# Patient Record
Sex: MALE | Race: WHITE | NOT HISPANIC OR LATINO | Employment: OTHER | URBAN - METROPOLITAN AREA
[De-identification: names, ages, dates, MRNs, and addresses within clinical notes are randomized per-mention and may not be internally consistent; named-entity substitution may affect disease eponyms.]

---

## 2021-05-02 ENCOUNTER — OFFICE VISIT (OUTPATIENT)
Dept: URGENT CARE | Facility: CLINIC | Age: 76
End: 2021-05-02
Payer: COMMERCIAL

## 2021-05-02 VITALS
DIASTOLIC BLOOD PRESSURE: 60 MMHG | HEIGHT: 74 IN | TEMPERATURE: 95.9 F | HEART RATE: 53 BPM | SYSTOLIC BLOOD PRESSURE: 140 MMHG | BODY MASS INDEX: 30.85 KG/M2 | RESPIRATION RATE: 18 BRPM | WEIGHT: 240.4 LBS | OXYGEN SATURATION: 98 %

## 2021-05-02 DIAGNOSIS — L03.115 CELLULITIS OF RIGHT LOWER EXTREMITY: Primary | ICD-10-CM

## 2021-05-02 PROCEDURE — 99213 OFFICE O/P EST LOW 20 MIN: CPT | Performed by: PHYSICIAN ASSISTANT

## 2021-05-02 RX ORDER — HYDROCHLOROTHIAZIDE 25 MG/1
25 TABLET ORAL DAILY
COMMUNITY
End: 2022-05-05 | Stop reason: SDUPTHER

## 2021-05-02 RX ORDER — GLIMEPIRIDE 2 MG/1
2 TABLET ORAL
COMMUNITY
End: 2022-01-14 | Stop reason: SDUPTHER

## 2021-05-02 RX ORDER — CHLORAL HYDRATE 500 MG
4000 CAPSULE ORAL DAILY
COMMUNITY

## 2021-05-02 RX ORDER — ATORVASTATIN CALCIUM 40 MG/1
40 TABLET, FILM COATED ORAL DAILY
COMMUNITY
End: 2022-05-10 | Stop reason: SDUPTHER

## 2021-05-02 RX ORDER — AMLODIPINE BESYLATE 5 MG/1
5 TABLET ORAL DAILY
COMMUNITY
End: 2021-10-14 | Stop reason: SDUPTHER

## 2021-05-02 RX ORDER — LOSARTAN POTASSIUM 50 MG/1
100 TABLET ORAL DAILY
COMMUNITY
End: 2021-10-27 | Stop reason: SDUPTHER

## 2021-05-02 RX ORDER — ASPIRIN 81 MG/1
81 TABLET, CHEWABLE ORAL DAILY
COMMUNITY

## 2021-05-02 RX ORDER — CLOPIDOGREL BISULFATE 75 MG/1
75 TABLET ORAL DAILY
COMMUNITY

## 2021-05-02 RX ORDER — SULFAMETHOXAZOLE AND TRIMETHOPRIM 800; 160 MG/1; MG/1
1 TABLET ORAL EVERY 12 HOURS SCHEDULED
Qty: 14 TABLET | Refills: 0 | Status: SHIPPED | OUTPATIENT
Start: 2021-05-02 | End: 2021-05-09

## 2021-05-02 RX ORDER — METOPROLOL SUCCINATE 50 MG/1
50 TABLET, EXTENDED RELEASE ORAL DAILY
COMMUNITY
End: 2021-10-08 | Stop reason: SDUPTHER

## 2021-05-02 NOTE — PATIENT INSTRUCTIONS
Cellulitis of right lower extremity  Stop doxycycline for puncture wound  rx bactrim twice daily x 7 days sent via EMR  Instructed on daily dressing changes and cleansing daily  If no improvement in 3-5 days f/u with PCP or wound care due to diabetes history    Follow up with PCP in 3-5 days  Proceed to  ER if symptoms worsen  Cellulitis   WHAT YOU NEED TO KNOW:   Cellulitis is a skin infection caused by bacteria  Cellulitis may go away on its own or you may need treatment  Your healthcare provider may draw a [place] around the outside edges of your cellulitis  If your cellulitis spreads, your healthcare provider will see it outside of the [place]  DISCHARGE INSTRUCTIONS:   Call 911 if:   · You have sudden trouble breathing or chest pain  Return to the emergency department if:   · Your wound gets larger and more painful  · You feel a crackling under your skin when you touch it  · You have purple dots or bumps on your skin, or you see bleeding under your skin  · You have new swelling and pain in your legs  · The red, warm, swollen area gets larger  · You see red streaks coming from the infected area  Contact your healthcare provider if:   · You have a fever  · Your fever or pain does not go away or gets worse  · The area does not get smaller after 2 days of antibiotics  · Your skin is flaking or peeling off  · You have questions or concerns about your condition or care  Medicines:   · Antibiotics  help treat the bacterial infection  · NSAIDs , such as ibuprofen, help decrease swelling, pain, and fever  NSAIDs can cause stomach bleeding or kidney problems in certain people  If you take blood thinner medicine, always ask if NSAIDs are safe for you  Always read the medicine label and follow directions  Do not give these medicines to children under 10months of age without direction from your child's healthcare provider  · Acetaminophen  decreases pain and fever   It is available without a doctor's order  Ask how much to take and how often to take it  Follow directions  Read the labels of all other medicines you are using to see if they also contain acetaminophen, or ask your doctor or pharmacist  Acetaminophen can cause liver damage if not taken correctly  Do not use more than 4 grams (4,000 milligrams) total of acetaminophen in one day  · Take your medicine as directed  Contact your healthcare provider if you think your medicine is not helping or if you have side effects  Tell him or her if you are allergic to any medicine  Keep a list of the medicines, vitamins, and herbs you take  Include the amounts, and when and why you take them  Bring the list or the pill bottles to follow-up visits  Carry your medicine list with you in case of an emergency  Self-care:   · Elevate the area above the level of your heart  as often as you can  This will help decrease swelling and pain  Prop the area on pillows or blankets to keep it elevated comfortably  · Clean the area daily until the wound scabs over  Gently wash the area with soap and water  Pat dry  Use dressings as directed  · Place cool or warm, wet cloths on the area as directed  Use clean cloths and clean water  Leave it on the area until the cloth is room temperature  Pat the area dry with a clean, dry cloth  The cloths may help decrease pain  Prevent cellulitis:   · Do not scratch bug bites or areas of injury  You increase your risk for cellulitis by scratching these areas  · Do not share personal items, such as towels, clothing, and razors  · Clean exercise equipment  with germ-killing  before and after you use it  · Wash your hands often  Use soap and water  Wash your hands after you use the bathroom, change a child's diapers, or sneeze  Wash your hands before you prepare or eat food  Use lotion to prevent dry, cracked skin  · Wear pressure stockings as directed    You may be told to wear the stockings if you have peripheral edema  The stockings improve blood flow and decrease swelling  · Treat athlete's foot  This can help prevent the spread of a bacterial skin infection  Follow up with your healthcare provider within 3 days, or as directed: Your healthcare provider will check if your cellulitis is getting better  You may need different medicine  Write down your questions so you remember to ask them during your visits  © Copyright [address] Information is for End User's use only and may not be sold, redistributed or otherwise used for commercial purposes  All illustrations and images included in CareNotes® are the copyrighted property of [employer]  or [employer]  The above information is an  only  It is not intended as medical advice for individual conditions or treatments  Talk to your doctor, nurse or pharmacist before following any medical regimen to see if it is safe and effective for you

## 2021-05-02 NOTE — PROGRESS NOTES
[address]        NAME: Bronson Elizabeth is a 64 y o  male  : 1945    MRN: [id number]  DATE: May 2, 2021  TIME: 9:46 AM    Assessment and Plan   Cellulitis of right lower extremity [L03 115]  1  Cellulitis of right lower extremity  sulfamethoxazole-trimethoprim (BACTRIM DS) 800-160 mg per tablet     Patient Instructions   Cellulitis of right lower extremity  Stop doxycycline for puncture wound  rx bactrim twice daily x 7 days sent via EMR  Instructed on daily dressing changes and cleansing daily  If no improvement in 3-5 days f/u with PCP or wound care due to diabetes history    Follow up with PCP in 3-5 days  Proceed to  ER if symptoms worsen  Chief Complaint     Chief Complaint   Patient presents with    Leg Injury     Chey Dryer on Tuesday - R shin hit a stool  No w entire anteior lower shin red and tender with swelling from ankle to 3/4 up leg  No fever  On Doxycycline for foot injury - stepped on nail on L foot and went to local Urgent Care  History of Present Illness         Colletta Morgans is a 66-year-old male who presents to clinic complaining redness and pain surrounding a wound on his right shin  He states that 6 days ago he tripped over stool and fell on the ground he states that the stool hit him and shin created wound  He has been on doxycycline daily since Wednesday because he stepped on a nail the day after he fell  They placed him on doxycycline and administered a tetanus shot  He denies any problems with that wound but does note since the original injury the wound on his shin has been becoming more red more painful and some swelling surrounding it  He denies any fever or chills  His past medical history is significant for diabetes  He does not have a primary care provider in the area as he just recently moved here  Review of Systems   Review of Systems   Constitutional: Negative for chills and fever  Skin: Positive for color change and wound       Current Medications Current Outpatient Medications:     amLODIPine (NORVASC) 5 mg tablet, Take 5 mg by mouth daily, Disp: , Rfl:     aspirin 81 mg chewable tablet, Chew 81 mg daily, Disp: , Rfl:     atorvastatin (LIPITOR) 40 mg tablet, Take 40 mg by mouth daily, Disp: , Rfl:     canagliflozin (Invokana) 100 mg, Take 100 mg by mouth daily before breakfast, Disp: , Rfl:     clopidogrel (PLAVIX) 75 mg tablet, Take 75 mg by mouth daily, Disp: , Rfl:     glimepiride (AMARYL) 2 mg tablet, Take 2 mg by mouth every morning before breakfast, Disp: , Rfl:     hydrochlorothiazide (HYDRODIURIL) 25 mg tablet, Take 25 mg by mouth daily, Disp: , Rfl:     losartan (COZAAR) 50 mg tablet, Take 50 mg by mouth daily, Disp: , Rfl:     metFORMIN (GLUCOPHAGE) 1000 MG tablet, Take 1,000 mg by mouth 2 (two) times a day with meals, Disp: , Rfl:     metoprolol succinate (TOPROL-XL) 50 mg 24 hr tablet, Take 50 mg by mouth daily, Disp: , Rfl:     Multiple Vitamins-Minerals (MULTIVITAMIN MEN 50+ PO), Take by mouth daily, Disp: , Rfl:     Omega-3 Fatty Acids (fish oil) 1,000 mg, Take 1,000 mg by mouth 2 (two) times a day, Disp: , Rfl:     sitaGLIPtin (JANUVIA) 100 mg tablet, Take 100 mg by mouth daily, Disp: , Rfl:     sulfamethoxazole-trimethoprim (BACTRIM DS) 800-160 mg per tablet, Take 1 tablet by mouth every 12 (twelve) hours for 7 days, Disp: 14 tablet, Rfl: 0    Current Allergies     Allergies as of 05/02/2021 - Reviewed 05/02/2021   Allergen Reaction Noted    Lisinopril Rash and Lip Swelling 05/02/2021            The following portions of the patient's history were reviewed and updated as appropriate: allergies, current medications, past family history, past medical history, past social history, past surgical history and problem list      Past Medical History:   Diagnosis Date    CAD (coronary artery disease)     [facility]     prostate    [facility]estive [facility] failure[facility]     [facility]     [facility])        Past Surgical History:   Procedure Laterality Date    ADENOIDECTOMY      CARDIAC SURGERY  2002    3 cardiac bypass then angioplasty 7/2020    CHOLECYSTECTOMY      CORONARY ARTERY BYPASS GRAFT      PROSTATE SURGERY      TONSILLECTOMY         History reviewed  No pertinent family history  Medications have been verified  Objective   /60   Pulse (!) 53   Temp (!) 95 9 °F (35 5 °C)   Resp 18   Ht 6' 2" (1 88 m)   Wt 109 kg (240 lb 6 4 oz)   SpO2 98%   BMI 30 87 kg/m²   No LMP for male patient  Physical Exam     Physical Exam  Vitals signs and nursing note reviewed  Constitutional:       General: He is not in acute distress  Appearance: Normal appearance  He is not ill-appearing  Cardiovascular:      Rate and Rhythm: Normal rate and regular rhythm  Heart sounds: Normal heart sounds  Pulmonary:      Effort: Pulmonary effort is normal       Breath sounds: Normal breath sounds  Musculoskeletal:      Right lower leg: He exhibits tenderness, swelling and laceration  He exhibits no bony tenderness and no deformity  No edema  Legs:    Neurological:      Mental Status: He is alert and oriented to person, place, and time     Psychiatric:         Mood and Affect: Mood normal          Behavior: Behavior normal

## 2021-05-11 ENCOUNTER — TELEPHONE (OUTPATIENT)
Dept: FAMILY MEDICINE CLINIC | Facility: CLINIC | Age: 76
End: 2021-05-11

## 2021-05-11 ENCOUNTER — OFFICE VISIT (OUTPATIENT)
Dept: FAMILY MEDICINE CLINIC | Facility: CLINIC | Age: 76
End: 2021-05-11
Payer: COMMERCIAL

## 2021-05-11 VITALS
RESPIRATION RATE: 22 BRPM | SYSTOLIC BLOOD PRESSURE: 130 MMHG | HEIGHT: 73 IN | BODY MASS INDEX: 31.94 KG/M2 | WEIGHT: 241 LBS | HEART RATE: 60 BPM | TEMPERATURE: 97.2 F | OXYGEN SATURATION: 95 % | DIASTOLIC BLOOD PRESSURE: 70 MMHG
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
## 2021-05-11 DIAGNOSIS — Z85.46 H/O PROSTATE CANCER: ICD-10-CM

## 2021-05-11 DIAGNOSIS — E11.42 TYPE 2 DIABETES MELLITUS WITH DIABETIC POLYNEUROPATHY, WITHOUT LONG-TERM CURRENT USE OF INSULIN (HCC): Primary | ICD-10-CM

## 2021-05-11 DIAGNOSIS — Z95.1 S/P CABG X 3: ICD-10-CM

## 2021-05-11 DIAGNOSIS — E66.9 OBESITY (BMI 30.0-34.9): ICD-10-CM

## 2021-05-11 DIAGNOSIS — Z86.79 S/P AAA REPAIR: ICD-10-CM

## 2021-05-11 DIAGNOSIS — Z95.820 S/P ANGIOPLASTY WITH STENT: ICD-10-CM

## 2021-05-11 DIAGNOSIS — Z90.79 S/P PROSTATECTOMY: ICD-10-CM

## 2021-05-11 DIAGNOSIS — Z98.890 S/P AAA REPAIR: ICD-10-CM

## 2021-05-11 DIAGNOSIS — I10 ESSENTIAL HYPERTENSION: ICD-10-CM

## 2021-05-11 DIAGNOSIS — I25.10 CORONARY ARTERY DISEASE INVOLVING NATIVE CORONARY ARTERY OF NATIVE HEART WITHOUT ANGINA PECTORIS: ICD-10-CM

## 2021-05-11 DIAGNOSIS — T14.8XXA OPEN WOUND: ICD-10-CM

## 2021-05-11 DIAGNOSIS — E78.2 MIXED HYPERLIPIDEMIA: ICD-10-CM

## 2021-05-11 PROCEDURE — 99204 OFFICE O/P NEW MOD 45 MIN: CPT | Performed by: FAMILY MEDICINE

## 2021-05-11 RX ORDER — GABAPENTIN 100 MG/1
100 CAPSULE ORAL 2 TIMES DAILY
Qty: 60 CAPSULE | Refills: 5 | Status: SHIPPED | OUTPATIENT
Start: 2021-05-11 | End: 2021-06-10

## 2021-05-11 NOTE — PROGRESS NOTES
Assessment/Plan:    1  Type 2 diabetes mellitus with diabetic polyneuropathy, without long-term current use of insulin ([facility]  -     Ambulatory referral to Podiatry; Future  -     Ambulatory Referral to Ophthalmology; Future  -     IRIS Diabetic eye exam  -     Microalbumin / creatinine urine ratio; Future  -     Hemoglobin A1C; Future  -     gabapentin (NEURONTIN) 100 mg capsule; Take 1 capsule (100 mg total) by mouth 2 (two) times a day    2  Essential hypertension  Assessment & Plan:  stable    Orders:  -     Comprehensive metabolic panel; Future    3  Mixed hyperlipidemia  -     Lipid Panel with Direct LDL reflex; Future    4  Open wound  -     mupirocin (BACTROBAN) 2 % ointment; Apply topically 3 (three) times a day    5  Coronary artery disease involving native coronary artery of native heart without angina pectoris    6  H/O prostate cancer  -     PSA, Total Screen; Future    7  S/P AAA repair    8  S/P angioplasty with stent    9  S/P CABG x 3    10  S/P prostatectomy    11  Obesity (BMI 30 0-34 9)    12  BMI 31 0-31 9,adult    13  Fall, initial encounter          Patient Instructions       Obesity   AMBULATORY CARE:   Obesity  is when your body mass index (BMI) is greater than 30  Your healthcare provider will use your height and weight to measure your BMI  The risks of obesity include  many health problems, such as injuries or physical disability  You may need tests to check for the following:  · Diabetes    · High blood pressure or high cholesterol    · Heart disease    · Gallbladder or liver disease    · Cancer of the colon, breast, prostate, liver, or kidney    · Sleep apnea    · Arthritis or gout    Seek care immediately if:   · You have a severe headache, confusion, or difficulty speaking  · You have weakness on one side of your body  · You have chest pain, sweating, or shortness of breath      Contact your healthcare provider if:   · You have symptoms of gallbladder or liver disease, such as pain in your upper abdomen  · You have knee or hip pain and discomfort while walking  · You have symptoms of diabetes, such as intense hunger and thirst, and frequent urination  · You have symptoms of sleep apnea, such as snoring or daytime sleepiness  · You have questions or concerns about your condition or care  Treatment for obesity  focuses on helping you lose weight to improve your health  Even a small decrease in BMI can reduce the risk for many health problems  Your healthcare provider will help you set a weight-loss goal   · Lifestyle changes  are the first step in treating obesity  These include making healthy food choices and getting regular physical activity  Your healthcare provider may suggest a weight-loss program that involves coaching, education, and therapy  · Medicine  may help you lose weight when it is used with a healthy diet and physical activity  · Surgery  can help you lose weight if you are very obese and have other health problems  There are several types of weight-loss surgery  Ask your healthcare provider for more information  Be successful losing weight:   · Set small, realistic goals  An example of a small goal is to walk for 20 minutes 5 days a week  Anther goal is to lose 5% of your body weight  · Tell friends, family members, and coworkers about your goals  and ask for their support  Ask a friend to lose weight with you, or join a weight-loss support group  · Identify foods or triggers that may cause you to overeat , and find ways to avoid them  Remove tempting high-calorie foods from your home and workplace  Place a bowl of fresh fruit on your kitchen counter  If stress causes you to eat, then find other ways to cope with stress  · Keep a diary to track what you eat and drink  Also write down how many minutes of physical activity you do each day  Weigh yourself once a week and record it in your diary  Eating changes:   You will need to eat 500 to 1,000 fewer calories each day than you currently eat to lose 1 to 2 pounds a week  The following changes will help you cut calories:  · Eat smaller portions  Use small plates, no larger than 9 inches in diameter  Fill your plate half full of fruits and vegetables  Measure your food using measuring cups until you know what a serving size looks like  · Eat 3 meals and 1 or 2 snacks each day  Plan your meals in advance  Mary [place] and eat at home most of the time  Eat slowly  Do not skip meals  Skipping meals can lead to overeating later in the day  This can make it harder for you to lose weight  Talk with a dietitian to help you make a meal plan and schedule that is right for you  · Eat fruits and vegetables at every meal   They are low in calories and high in fiber, which makes you feel full  Do not add butter, margarine, or cream sauce to vegetables  Use herbs to season steamed vegetables  · Eat less fat and fewer fried foods  Eat more baked or grilled chicken and fish  These protein sources are lower in calories and fat than red meat  Limit fast food  Dress your salads with olive oil and vinegar instead of bottled dressing  · Limit the amount of sugar you eat  Do not drink sugary beverages  Limit alcohol  Activity changes:  Physical activity is good for your body in many ways  It helps you burn calories and build strong muscles  It decreases stress and depression, and improves your mood  It can also help you sleep better  Talk to your healthcare provider before you begin an exercise program   · Exercise for at least 30 minutes 5 days a week  Start slowly  Set aside time each day for physical activity that you enjoy and that is convenient for you  It is best to do both weight training and an activity that increases your heart rate, such as walking, bicycling, or swimming  · Find ways to be more active  Do yard work and housecleaning  Walk up the stairs instead of using elevators   Spend your leisure time going to events that require walking, such as outdoor festivals or fairs  This extra physical activity can help you lose weight and keep it off  Follow up with your healthcare provider as directed: You may need to meet with a dietitian  Write down your questions so you remember to ask them during your visits  © Copyright [address] Information is for End User's use only and may not be sold, redistributed or otherwise used for commercial purposes  All illustrations and images included in CareNotes® are the copyrighted property of [employer]  or [facility] Angel Gaspar   The above information is an  only  It is not intended as medical advice for individual conditions or treatments  Talk to your doctor, nurse or pharmacist before following any medical regimen to see if it is safe and effective for you  Fall Prevention   AMBULATORY CARE:   Fall prevention  includes ways to make your home and other areas safer  It also includes ways you can move more carefully to prevent a fall  Health conditions that cause changes in your blood pressure, vision, or muscle strength and coordination may increase your risk for falls  Medicines may also increase your risk for falls if they make you dizzy, weak, or sleepy  Call 911 or have someone else call if:   · You have fallen and are unconscious  · You have fallen and cannot move part of your body  Contact your healthcare provider if:   · You have fallen and have pain or a headache  · You have questions or concerns about your condition or care  Fall prevention tips:   · Stand or sit up slowly  This may help you keep your balance and prevent falls  · Use assistive devices as directed  Your healthcare provider may suggest that you use a cane or walker to help you keep your balance  You may need to have grab bars put in your bathroom near the toilet or in the shower  · Wear shoes that fit well and have soles that   Wear shoes both inside and outside  Use slippers with good   Do not wear shoes with high heels  · Wear a personal alarm  This is a device that allows you to call 911 if you fall and need help  Ask your healthcare provider for more information  · Stay active  Exercise can help strengthen your muscles and improve your balance  Your healthcare provider may recommend water aerobics or walking  He or she may also recommend physical therapy to improve your coordination  Never start an exercise program without talking to your healthcare provider first          · Manage your medical conditions  Keep all appointments with your healthcare providers  Visit your eye doctor as directed  Home safety tips:       · Add items to prevent falls in the bathroom  Put nonslip strips on your bath or shower floor to prevent you from slipping  Use a bath mat if you do not have carpet in the bathroom  This will prevent you from falling when you step out of the bath or shower  Use a shower seat so you do not need to stand while you shower  Sit on the toilet or a chair in your bathroom to dry yourself and put on clothing  This will prevent you from losing your balance from drying or dressing yourself while you are standing  · Keep paths clear  Remove books, shoes, and other objects from walkways and stairs  Place cords for telephones and lamps out of the way so that you do not need to walk over them  Tape them down if you cannot move them  Remove small rugs  If you cannot remove a rug, secure it with double-sided tape  This will prevent you from tripping  · Install bright lights in your home  Use night lights to help light paths to the bathroom or kitchen  Always turn on the light before you start walking  · Keep items you use often on shelves within reach  Do not use a step stool to help you reach an item  · Paint or place reflective tape on the edges of your stairs    This will help you see the stairs better  Follow up with your healthcare provider as directed:  Write down your questions so you remember to ask them during your visits  © Copyright [address] Information is for End User's use only and may not be sold, redistributed or otherwise used for commercial purposes  All illustrations and images included in CareNotes® are the copyrighted property of A D A M , Inc  or Leticia Gaspar   The above information is an  only  It is not intended as medical advice for individual conditions or treatments  Talk to your doctor, nurse or pharmacist before following any medical regimen to see if it is safe and effective for you  Return in about 4 weeks (around 6/8/2021) for Recheck DM  Subjective:      Patient ID: Marie Sheikh is a 64 y o  male  Chief Complaint   Patient presents with    Establish Care     Needs PCP  jani    Follow-up     fell and injury to garcia went to [place]  [facility]       Pt is being seen for the first time to follow up visit to urgent care  Pt just moved to the area  Pt was recently seen in urgent care - he tripped on a stool, states his leg got really red and he had to be seen  Was given abx and is doing much better  Pt states his biggest problem health wise is his legs, states they feel heavy, states climbing stairs is work  Pt has leg cramps  PT states he dries to drink water  Pt states they hurt like hell      The following portions of the patient's history were reviewed and updated as appropriate: allergies, current medications, past family history, past medical history, past social history, past surgical history and problem list     Review of Systems   Constitutional: Negative for activity change, appetite change, chills, diaphoresis, fatigue, fever and unexpected weight change  HENT: Negative for congestion, dental problem, ear pain, mouth sores, sinus pressure, sinus pain, sore throat and trouble swallowing      Eyes: Negative for photophobia, discharge and itching  Respiratory: Negative for apnea, chest tightness and shortness of breath  Cardiovascular: Negative for chest pain, palpitations and leg swelling  Gastrointestinal: Negative for abdominal distention, abdominal pain, blood in stool, nausea and vomiting  Endocrine: Negative for cold intolerance, heat intolerance, polydipsia, polyphagia and polyuria  Genitourinary: Negative for difficulty urinating  Musculoskeletal: Positive for myalgias  Negative for arthralgias  Skin: Positive for wound  Negative for color change  Neurological: Positive for numbness  Negative for dizziness, syncope, speech difficulty and headaches  Hematological: Negative for adenopathy  Psychiatric/Behavioral: Negative for agitation and behavioral problems           Current Outpatient Medications   Medication Sig Dispense Refill    amLODIPine (NORVASC) 5 mg tablet Take 5 mg by mouth daily      aspirin 81 mg chewable tablet Chew 81 mg daily      atorvastatin (LIPITOR) 40 mg tablet Take 40 mg by mouth daily      canagliflozin (Invokana) 100 mg Take 100 mg by mouth daily before breakfast      clopidogrel (PLAVIX) 75 mg tablet Take 75 mg by mouth daily      glimepiride (AMARYL) 2 mg tablet Take 2 mg by mouth every morning before breakfast      hydrochlorothiazide (HYDRODIURIL) 25 mg tablet Take 25 mg by mouth daily      losartan (COZAAR) 50 mg tablet Take 100 mg by mouth daily       metFORMIN (GLUCOPHAGE) 1000 MG tablet Take 1,000 mg by mouth 2 (two) times a day with meals      metoprolol succinate (TOPROL-XL) 50 mg 24 hr tablet Take 50 mg by mouth daily      Multiple Vitamins-Minerals (MULTIVITAMIN MEN 50+ PO) Take by mouth daily      Omega-3 Fatty Acids (fish oil) 1,000 mg Take 1,000 mg by mouth 2 (two) times a day      sitaGLIPtin (JANUVIA) 100 mg tablet Take 100 mg by mouth daily      gabapentin (NEURONTIN) 100 mg capsule Take 1 capsule (100 mg total) by mouth 2 (two) times a day 60 capsule 5    mupirocin (BACTROBAN) 2 % ointment Apply topically 3 (three) times a day 22 g 0     No current facility-administered medications for this visit  Objective:    /70   Pulse 60   Temp (!) 97 2 °F (36 2 °C)   Resp 22   Ht 6' 1" (1 854 m)   Wt 109 kg (241 lb)   SpO2 95%   BMI 31 80 kg/m²        Physical Exam  Vitals signs and nursing note reviewed  Constitutional:       General: He is not in acute distress  Appearance: He is well-developed  He is not diaphoretic  HENT:      Head: Normocephalic and atraumatic  Right Ear: External ear normal       Left Ear: External ear normal       Nose: Nose normal       Mouth/Throat:      Pharynx: No oropharyngeal exudate  Eyes:      General: No scleral icterus  Right eye: No discharge  Left eye: No discharge  Pupils: Pupils are equal, round, and reactive to light  Neck:      Thyroid: No thyromegaly  Cardiovascular:      Rate and Rhythm: Normal rate  Pulses: no weak pulses          Dorsalis pedis pulses are 2+ on the right side and 2+ on the left side  Posterior tibial pulses are 2+ on the right side and 2+ on the left side  Heart sounds: Normal heart sounds  No murmur  Pulmonary:      Effort: Pulmonary effort is normal  No respiratory distress  Breath sounds: Normal breath sounds  No wheezing  Abdominal:      General: Bowel sounds are normal  There is no distension  Palpations: Abdomen is soft  There is no mass  Tenderness: There is no abdominal tenderness  There is no guarding or rebound  Musculoskeletal: Normal range of motion  Feet:    Feet:      Right foot:      Skin integrity: No ulcer, skin breakdown, erythema, warmth, callus or dry skin  Left foot:      Skin integrity: Callus present  No ulcer, skin breakdown, erythema, warmth or dry skin  Skin:     General: Skin is warm and dry  Findings: No erythema or rash        Comments: Wound on rt ant shin Neurological:      Mental Status: He is alert  Coordination: Coordination normal       Deep Tendon Reflexes: Reflexes normal    Psychiatric:         Behavior: Behavior normal             Diabetic Foot Exam    Patient's shoes and socks removed  Right Foot/Ankle   Right Foot Inspection  Skin Exam: skin normal skin not intact, no dry skin, no warmth, no callus, no erythema, no maceration, no abnormal color, no pre-ulcer, no ulcer and no callus                          Toe Exam: ROM and strength within normal limits  Sensory   Vibration: intact  Proprioception: intact   Monofilament testing: diminished  Vascular  Capillary refills: < 3 seconds  The right DP pulse is 2+  The right PT pulse is 2+  Left Foot/Ankle  Left Foot Inspection  Skin Exam: skin normal, pre-ulcer and callusskin not intact, no dry skin, no warmth, no erythema, no maceration, normal color and no ulcer                         Toe Exam: ROM and strength within normal limits                   Sensory   Vibration: intact  Proprioception: intact  Monofilament: diminished  Vascular  Capillary refills: < 3 seconds  The left DP pulse is 2+  The left PT pulse is 2+  Assign Risk Category:  No deformity present; No loss of protective sensation; No weak pulses       Risk: 0        Tadeo Delgadillo DO  BMI Counseling: Body mass index is 31 8 kg/m²  The BMI is above normal  Nutrition recommendations include reducing portion sizes  Falls Plan of Care: Balance, strength, and gait training instructions were provided

## 2021-05-11 NOTE — PATIENT INSTRUCTIONS
Obesity   AMBULATORY CARE:   Obesity  is when your body mass index (BMI) is greater than 30  Your healthcare provider will use your height and weight to measure your BMI  The risks of obesity include  many health problems, such as injuries or physical disability  You may need tests to check for the following:  · Diabetes    · High blood pressure or high cholesterol    · Heart disease    · Gallbladder or liver disease    · Cancer of the colon, breast, prostate, liver, or kidney    · Sleep apnea    · Arthritis or gout    Seek care immediately if:   · You have a severe headache, confusion, or difficulty speaking  · You have weakness on one side of your body  · You have chest pain, sweating, or shortness of breath  Contact your healthcare provider if:   · You have symptoms of gallbladder or liver disease, such as pain in your upper abdomen  · You have knee or hip pain and discomfort while walking  · You have symptoms of diabetes, such as intense hunger and thirst, and frequent urination  · You have symptoms of sleep apnea, such as snoring or daytime sleepiness  · You have questions or concerns about your condition or care  Treatment for obesity  focuses on helping you lose weight to improve your health  Even a small decrease in BMI can reduce the risk for many health problems  Your healthcare provider will help you set a weight-loss goal   · Lifestyle changes  are the first step in treating obesity  These include making healthy food choices and getting regular physical activity  Your healthcare provider may suggest a weight-loss program that involves coaching, education, and therapy  · Medicine  may help you lose weight when it is used with a healthy diet and physical activity  · Surgery  can help you lose weight if you are very obese and have other health problems  There are several types of weight-loss surgery  Ask your healthcare provider for more information      Be successful losing weight:   · Set small, realistic goals  An example of a small goal is to walk for 20 minutes 5 days a week  Anther goal is to lose 5% of your body weight  · Tell friends, family members, and coworkers about your goals  and ask for their support  Ask a friend to lose weight with you, or join a weight-loss support group  · Identify foods or triggers that may cause you to overeat , and find ways to avoid them  Remove tempting high-calorie foods from your home and workplace  Place a bowl of fresh fruit on your kitchen counter  If stress causes you to eat, then find other ways to cope with stress  · Keep a diary to track what you eat and drink  Also write down how many minutes of physical activity you do each day  Weigh yourself once a week and record it in your diary  Eating changes: You will need to eat 500 to 1,000 fewer calories each day than you currently eat to lose 1 to 2 pounds a week  The following changes will help you cut calories:  · Eat smaller portions  Use small plates, no larger than 9 inches in diameter  Fill your plate half full of fruits and vegetables  Measure your food using measuring cups until you know what a serving size looks like  · Eat 3 meals and 1 or 2 snacks each day  Plan your meals in advance  Jon Tolentino and eat at home most of the time  Eat slowly  Do not skip meals  Skipping meals can lead to overeating later in the day  This can make it harder for you to lose weight  Talk with a dietitian to help you make a meal plan and schedule that is right for you  · Eat fruits and vegetables at every meal   They are low in calories and high in fiber, which makes you feel full  Do not add butter, margarine, or cream sauce to vegetables  Use herbs to season steamed vegetables  · Eat less fat and fewer fried foods  Eat more baked or grilled chicken and fish  These protein sources are lower in calories and fat than red meat  Limit fast food   Dress your salads with olive oil and vinegar instead of bottled dressing  · Limit the amount of sugar you eat  Do not drink sugary beverages  Limit alcohol  Activity changes:  Physical activity is good for your body in many ways  It helps you burn calories and build strong muscles  It decreases stress and depression, and improves your mood  It can also help you sleep better  Talk to your healthcare provider before you begin an exercise program   · Exercise for at least 30 minutes 5 days a week  Start slowly  Set aside time each day for physical activity that you enjoy and that is convenient for you  It is best to do both weight training and an activity that increases your heart rate, such as walking, bicycling, or swimming  · Find ways to be more active  Do yard work and housecleaning  Walk up the stairs instead of using elevators  Spend your leisure time going to events that require walking, such as outdoor festivals or fairs  This extra physical activity can help you lose weight and keep it off  Follow up with your healthcare provider as directed: You may need to meet with a dietitian  Write down your questions so you remember to ask them during your visits  © Copyright [address] [place] [address] Drive Information is for End User's use only and may not be sold, redistributed or otherwise used for commercial purposes  All illustrations and images included in CareNotes® are the copyrighted property of A D A M , Inc  or [facility]   The above information is an  only  It is not intended as medical advice for individual conditions or treatments  Talk to your doctor, nurse or pharmacist before following any medical regimen to see if it is safe and effective for you  Fall Prevention   AMBULATORY CARE:   Fall prevention  includes ways to make your home and other areas safer  It also includes ways you can move more carefully to prevent a fall   Health conditions that cause changes in your blood pressure, vision, or muscle strength and coordination may increase your risk for falls  Medicines may also increase your risk for falls if they make you dizzy, weak, or sleepy  Call 911 or have someone else call if:   · You have fallen and are unconscious  · You have fallen and cannot move part of your body  Contact your healthcare provider if:   · You have fallen and have pain or a headache  · You have questions or concerns about your condition or care  Fall prevention tips:   · Stand or sit up slowly  This may help you keep your balance and prevent falls  · Use assistive devices as directed  Your healthcare provider may suggest that you use a cane or walker to help you keep your balance  You may need to have grab bars put in your bathroom near the toilet or in the shower  · Wear shoes that fit well and have soles that   Wear shoes both inside and outside  Use slippers with good   Do not wear shoes with high heels  · Wear a personal alarm  This is a device that allows you to call 911 if you fall and need help  Ask your healthcare provider for more information  · Stay active  Exercise can help strengthen your muscles and improve your balance  Your healthcare provider may recommend water aerobics or walking  He or she may also recommend physical therapy to improve your coordination  Never start an exercise program without talking to your healthcare provider first          · Manage your medical conditions  Keep all appointments with your healthcare providers  Visit your eye doctor as directed  Home safety tips:       · Add items to prevent falls in the bathroom  Put nonslip strips on your bath or shower floor to prevent you from slipping  Use a bath mat if you do not have carpet in the bathroom  This will prevent you from falling when you step out of the bath or shower  Use a shower seat so you do not need to stand while you shower   Sit on the toilet or a chair in your bathroom to dry yourself and put on clothing  This will prevent you from losing your balance from drying or dressing yourself while you are standing  · Keep paths clear  Remove books, shoes, and other objects from walkways and stairs  Place cords for telephones and lamps out of the way so that you do not need to walk over them  Tape them down if you cannot move them  Remove small rugs  If you cannot remove a rug, secure it with double-sided tape  This will prevent you from tripping  · Install bright lights in your home  Use night lights to help light paths to the bathroom or kitchen  Always turn on the light before you start walking  · Keep items you use often on shelves within reach  Do not use a step stool to help you reach an item  · Paint or place reflective tape on the edges of your stairs  This will help you see the stairs better  Follow up with your healthcare provider as directed:  Write down your questions so you remember to ask them during your visits  © Copyright [address] Information is for End User's use only and may not be sold, redistributed or otherwise used for commercial purposes  All illustrations and images included in CareNotes® are the copyrighted property of A D A [employer]  or [address] [place], [place] [address]og fatemeh   The above information is an  only  It is not intended as medical advice for individual conditions or treatments  Talk to your doctor, nurse or pharmacist before following any medical regimen to see if it is safe and effective for you

## 2021-05-11 NOTE — TELEPHONE ENCOUNTER
----- Message from Blaine Holguin DO sent at 5/11/2021 10:00 AM EDT -----  Regarding: old records  Dr Jacob Ross   [phone]    Can we get his last AWV, Vaccine record, colonoscopy

## 2021-05-20 ENCOUNTER — TELEPHONE (OUTPATIENT)
Dept: FAMILY MEDICINE CLINIC | Facility: CLINIC | Age: 76
End: 2021-05-20

## 2021-05-20 LAB
LEFT EYE DIABETIC RETINOPATHY: ABNORMAL
LEFT EYE IMAGE QUALITY: ABNORMAL
LEFT EYE MACULAR EDEMA: ABNORMAL
LEFT EYE OTHER RETINOPATHY: ABNORMAL
RIGHT EYE DIABETIC RETINOPATHY: ABNORMAL
RIGHT EYE IMAGE QUALITY: ABNORMAL
RIGHT EYE MACULAR EDEMA: ABNORMAL
RIGHT EYE OTHER RETINOPATHY: ABNORMAL
SEVERITY (EYE EXAM): ABNORMAL

## 2021-05-20 NOTE — TELEPHONE ENCOUNTER
Pt informed   States he has an appt 6/11 with dr Caleb Ely already set up  [facility]

## 2021-05-20 NOTE — TELEPHONE ENCOUNTER
Please call, pt had iris screen  - looks like he may be developing some damage from diabetes to his retina    Our test is just a screen he will need to see optho, I would suggest dr Natividad Castaneda

## 2021-05-27 ENCOUNTER — CONSULT (OUTPATIENT)
Dept: CARDIOLOGY CLINIC | Facility: CLINIC | Age: 76
End: 2021-05-27
Payer: COMMERCIAL

## 2021-05-27 VITALS
TEMPERATURE: 97.5 F | DIASTOLIC BLOOD PRESSURE: 76 MMHG | WEIGHT: 241.8 LBS | HEART RATE: 54 BPM | HEIGHT: 73 IN | SYSTOLIC BLOOD PRESSURE: 120 MMHG | OXYGEN SATURATION: 98 % | BODY MASS INDEX: 32.05 KG/M2

## 2021-05-27 DIAGNOSIS — Z95.828 HISTORY OF ENDOVASCULAR STENT GRAFT FOR ABDOMINAL AORTIC ANEURYSM (AAA): Primary | ICD-10-CM

## 2021-05-27 PROCEDURE — 93000 ELECTROCARDIOGRAM COMPLETE: CPT | Performed by: INTERNAL MEDICINE

## 2021-05-27 PROCEDURE — 99204 OFFICE O/P NEW MOD 45 MIN: CPT | Performed by: INTERNAL MEDICINE

## 2021-05-27 NOTE — PROGRESS NOTES
Consultation - Cardiology Office  [facility]  Rosanne Tinsley 64 y o  male MRN: [id number]  : 1945  Unit/Bed#:  Encounter: [id number]      ASSESSMENT:  CAD, s/p CABG X 3 in  at [facility], s/p PCI in  at [facility]  On aspirin 81 mg, Plavix 75 mg, statin and beta-blocker    S/P Abdominal aortic aneurysm repair w/ Stent     Hypertension  On amlodipine 5 mg, hydrochlorothiazide 25 mg, losartan 50 mg, Toprol 50 mg,    Mixed hyperlipidemia  On fish oil 2 g daily, atorvastatin 40 mg    Diabetes mellitus type 2    Obesity    S/p prostatectomy for CA prostate    Open wound  Managed by PCP      RECOMMENDATIONS:  Continue current cardiac medications  Obtain old records of cardiac interventions  Check results of lipid profile and CMP ordered by PCP and adjust medications as needed      Thank you for your consultation  If you have any question please call me at [phone]      Primary Care Physician Requesting Consult: Alessandra Montelongo DO      Reason for Consult / Principal Problem:  Management of cardiac problems        HPI :     Rosanne Life is a 64y o  year old male who was referred by primary care doctor for management of cardiac problems  Patient has a history of Coronary artery disease status post CABG and PCI, peripheral arterial disease status post abdominal aortic aneurysm stenting, hypertension, hyperlipidemia and diabetes mellitus  Previously he was seeing physicians at [facility] and [facility] and has recently relocated to this area to [place] community with his wife to be in close proximity with his children and  grandchildren  He denies any chest pain or unusual dyspnea and is very physically active    REVIEW OF SYSTEMS:   Constitutional: Negative for activity change, appetite change, chills, fatigue, fever and unexpected weight change  HENT: Negative for congestion, sore throat and trouble swallowing      Eyes: Negative for discharge and redness  Respiratory: Negative for apnea, cough, chest tightness, shortness of breath and wheezing  Cardiovascular: Negative for chest pain, shortness of breath, palpitations and leg swelling  Gastrointestinal: Negative for abdominal distention, abdominal pain, anal bleeding, blood in stool, constipation, diarrhea, nausea and vomiting  Endocrine: Negative for polydipsia, polyphagia and polyuria  Genitourinary: Negative for difficulty urinating, dysuria, flank pain and hematuria  Musculoskeletal: Negative for arthralgias, myalgias and neck stiffness  Skin: Negative for pallor and rash  Allergic/Immunologic: Negative for environmental allergies  Neurological: Negative for dizziness, syncope, light-headedness, numbness and headaches  Hematological: Negative for adenopathy  Does not bruise/bleed easily  Psychiatric/Behavioral: Negative for confusion and hallucinations  The patient is not nervous/anxious        Historical Information   Past Medical History:   Diagnosis Date    CAD (coronary artery disease)     [facility] )     prostate    [facility]estive [facility] failure[facility] ([facility])     Diabetes mellitus ([place] [address] )     Myocardial infarction [facility])      Past Surgical History:   Procedure Laterality Date    ADENOIDECTOMY      CARDIAC SURGERY  2002    3 cardiac bypass then angioplasty 2020    CHOLECYSTECTOMY      CORONARY ARTERY BYPASS GRAFT      PROSTATE SURGERY      TONSILLECTOMY       Social History     Substance and Sexual Activity   Alcohol Use Yes    Alcohol/week: 14 0 standard drinks    Types: 14 Cans of beer per week    Frequency: 4 or more times a week    Drinks per session: 1 or 2     Social History     Substance and Sexual Activity   Drug Use Never     Social History     Tobacco Use   Smoking Status Former Smoker    Types: Cigarettes    Quit date: 80    Years since quittin 4   Smokeless Tobacco Never Used     Family History:   Family History   Problem Relation Age of Onset    Diabetes Mother     Alcohol abuse Father     Mental illness Neg Hx        Meds/Allergies     Allergies   Allergen Reactions    Lisinopril Rash and Lip Swelling       Current Outpatient Medications:     amLODIPine (NORVASC) 5 mg tablet, Take 5 mg by mouth daily, Disp: , Rfl:     aspirin 81 mg chewable tablet, Chew 81 mg daily, Disp: , Rfl:     atorvastatin (LIPITOR) 40 mg tablet, Take 40 mg by mouth daily, Disp: , Rfl:     canagliflozin (Invokana) 100 mg, Take 100 mg by mouth daily before breakfast, Disp: , Rfl:     clopidogrel (PLAVIX) 75 mg tablet, Take 75 mg by mouth daily, Disp: , Rfl:     gabapentin (NEURONTIN) 100 mg capsule, Take 1 capsule (100 mg total) by mouth 2 (two) times a day, Disp: 60 capsule, Rfl: 5    glimepiride (AMARYL) 2 mg tablet, Take 2 mg by mouth every morning before breakfast, Disp: , Rfl:     hydrochlorothiazide (HYDRODIURIL) 25 mg tablet, Take 25 mg by mouth daily, Disp: , Rfl:     losartan (COZAAR) 50 mg tablet, Take 100 mg by mouth daily , Disp: , Rfl:     metFORMIN (GLUCOPHAGE) 1000 MG tablet, Take 1,000 mg by mouth 2 (two) times a day with meals, Disp: , Rfl:     metoprolol succinate (TOPROL-XL) 50 mg 24 hr tablet, Take 50 mg by mouth daily, Disp: , Rfl:     Multiple Vitamins-Minerals (MULTIVITAMIN MEN 50+ PO), Take by mouth daily, Disp: , Rfl:     mupirocin (BACTROBAN) 2 % ointment, Apply topically 3 (three) times a day, Disp: 22 g, Rfl: 0    Omega-3 Fatty Acids (fish oil) 1,000 mg, Take 1,000 mg by mouth 2 (two) times a day, Disp: , Rfl:     sitaGLIPtin (JANUVIA) 100 mg tablet, Take 100 mg by mouth daily, Disp: , Rfl:     Vitals: There were no vitals taken for this visit  There is no height or weight on file to calculate BMI  There were no vitals filed for this visit    BP Readings from Last 3 Encounters:   05/11/21 130/70   05/02/21 140/60         PHYSICAL EXAMINATION:  Neurologic:  Alert & oriented x 3, no new focal deficits, Not in any acute distress,  Constitutional:  Well developed, well nourished, non-toxic appearance   Eyes:  Pupil equal and reacting to light, conjunctiva normal, No JVP, No LNP   HENT:  Atraumatic, oropharynx moist, Neck- normal range of motion, no tenderness,  Neck supple   Respiratory:  Bilateral air entry, mostly clear to auscultation  Cardiovascular: S1-S2 regular with a I/VI systolic murmur   GI:  Soft, nondistended, normal bowel sounds, nontender, no hepatosplenomegaly appreciated  Musculoskeletal:  No edema, no tenderness, no deformities  Skin:  Well hydrated, no rash   Lymphatic:  No lymphadenopathy noted   Extremities:  No edema and distal pulses are present, varicose veins are noted    Diagnostic Studies Review Cardio:      EKG:  Sinus bradycardia, heart rate 54 per minute, first-degree AV block, occasional PVCs, LAFB, LVH, ST and T-wave abnormality in inferior leads, no previous EKG available for comparison    Cardiac testing:   No results found for this or any previous visit  Imaging:  Chest X-Ray:   No Chest XR results available for this patient  CT-scan of the chest:     No CTA results available for this patient    Lab Review   No results found for: WBC, HGB, HCT, MCV, RDW, PLT  BMP:  No results found for: SODIUM, K, CL, CO2, ANIONGAP, BUN, CREATININE, GLUC, GLUF, CALCIUM, CORRECTEDCA, EGFR, MG  LFT:  No results found for: AST, ALT, ALKPHOS, TP, ALB   No results found for: [id number]  No components found for: TSH3  No results found for: HGBA1C  Lipid Profile:   No results found for: CHOLESTEROL, HDL, LDLCALC, TRIG  No results found for: CHOLESTEROL  No results found for: CKTOTAL, CKMB, CKMBINDEX, TROPONINI  No results found for: NTBNP   Recent Results (from the past 672 hour(s))   IRIS Diabetic eye exam    Collection Time: 05/18/21 10:19 AM   Result Value Ref Range    Severity ALERT (A)     Right Eye Diabetic Retinopathy Mild (A)     Right Eye Macular Edema None     Right Eye Other Retinopathy None Right Eye Image Quality Gradable Image     Left Eye Diabetic Retinopathy None     Left Eye Macular Edema None     Left Eye Other Retinopathy None     Left Eye Image Quality Not Gradable Image     Result Retinal Study Result for [facility]     Result      Result       [facility], a 65 y/o, M (: 1945, MRN: [id number])    Result       presented to [facility] on 2021 for a retinal imaging study of the left and right eyes  Result      Result       Based on the findings of the study, the following is recommended for [facility]    Result       Non-Gradable Eye(s): Please advise the patient to schedule an appointment with comprehensive ophthalmology, next available appointment  Result      Result       Interpreting Provider's Comments:  No comments provided    Result       Diagnoses Present:  - Type 2 diabetes mellitus with diabetic polyneuropathy    Result       [id number] - Diabetes mellitus due to underlying condition with mild nonproliferative diabetic retinopathy without macular edema Right Eye    Result      Result Right eye findings: Diabetic Retinopathy: Mild     Result Negative for Macular Edema     Result      Result       Left eye findings: Image not gradable for reasons listed: Insufficient View of Macula    Result      Result      Result       This result was electronically signed by Uziel Quintanilla NPI: [id number], Taxonomy: [id number] on 2021 2:19 AM     Result      Result       NOTE:  Any pathology noted on this diabetic retinal evaluation should be confirmed by an appropriate ophthalmic examination  Dr Brett Washington MD, [facility]      "This note has been constructed using a voice recognition system  Therefore there may be syntax, spelling, and/or grammatical errors   Please call if you have any questions  "

## 2021-06-02 ENCOUNTER — APPOINTMENT (OUTPATIENT)
Dept: LAB | Facility: CLINIC | Age: 76
End: 2021-06-02
Payer: COMMERCIAL

## 2021-06-02 DIAGNOSIS — E11.42 TYPE 2 DIABETES MELLITUS WITH DIABETIC POLYNEUROPATHY, WITHOUT LONG-TERM CURRENT USE OF INSULIN (HCC): ICD-10-CM

## 2021-06-02 DIAGNOSIS — Z85.46 H/O PROSTATE CANCER: ICD-10-CM

## 2021-06-02 DIAGNOSIS — I10 ESSENTIAL HYPERTENSION: ICD-10-CM

## 2021-06-02 DIAGNOSIS — E78.2 MIXED HYPERLIPIDEMIA: ICD-10-CM

## 2021-06-02 LAB
ALBUMIN SERPL BCP-MCNC: 4 G/DL (ref 3.5–5)
ALP SERPL-CCNC: 44 U/L (ref 46–116)
ALT SERPL W P-5'-P-CCNC: 35 U/L (ref 12–78)
ANION GAP SERPL CALCULATED.3IONS-SCNC: 7 MMOL/L (ref 4–13)
AST SERPL W P-5'-P-CCNC: 29 U/L (ref 5–45)
BILIRUB SERPL-MCNC: 0.57 MG/DL (ref 0.2–1)
BUN SERPL-MCNC: 32 MG/DL (ref 5–25)
CALCIUM SERPL-MCNC: 9.4 MG/DL (ref 8.3–10.1)
CHLORIDE SERPL-SCNC: 105 MMOL/L (ref 100–108)
CHOLEST SERPL-MCNC: 144 MG/DL (ref 50–200)
CO2 SERPL-SCNC: 28 MMOL/L (ref 21–32)
CREAT SERPL-MCNC: 1.18 MG/DL (ref 0.6–1.3)
CREAT UR-MCNC: 99.2 MG/DL
EST. AVERAGE GLUCOSE BLD GHB EST-MCNC: 146 MG/DL
GFR SERPL CREATININE-BSD FRML MDRD: 59 ML/MIN/1.73SQ M
GLUCOSE P FAST SERPL-MCNC: 139 MG/DL (ref 65–99)
HBA1C MFR BLD: 6.7 %
HDLC SERPL-MCNC: 50 MG/DL
LDLC SERPL CALC-MCNC: 61 MG/DL (ref 0–100)
MICROALBUMIN UR-MCNC: 57.1 MG/L (ref 0–20)
MICROALBUMIN/CREAT 24H UR: 58 MG/G CREATININE (ref 0–30)
POTASSIUM SERPL-SCNC: 4 MMOL/L (ref 3.5–5.3)
PROT SERPL-MCNC: 7.8 G/DL (ref 6.4–8.2)
PSA SERPL-MCNC: <0.1 NG/ML (ref 0–4)
SODIUM SERPL-SCNC: 140 MMOL/L (ref 136–145)
TRIGL SERPL-MCNC: 167 MG/DL

## 2021-06-02 PROCEDURE — G0103 PSA SCREENING: HCPCS

## 2021-06-02 PROCEDURE — 82570 ASSAY OF URINE CREATININE: CPT

## 2021-06-02 PROCEDURE — 83036 HEMOGLOBIN GLYCOSYLATED A1C: CPT

## 2021-06-02 PROCEDURE — 80061 LIPID PANEL: CPT

## 2021-06-02 PROCEDURE — 36415 COLL VENOUS BLD VENIPUNCTURE: CPT

## 2021-06-02 PROCEDURE — 82043 UR ALBUMIN QUANTITATIVE: CPT

## 2021-06-02 PROCEDURE — 80053 COMPREHEN METABOLIC PANEL: CPT

## 2021-06-04 ENCOUNTER — RA CDI HCC (OUTPATIENT)
Dept: OTHER | Facility: HOSPITAL | Age: 76
End: 2021-06-04

## 2021-06-04 NOTE — PROGRESS NOTES
[facility]  coding opportunities          Chart reviewed, no opportunity found: CHART REVIEWED, NO OPPORTUNITY FOUND              Patients insurance company: [facility] [address]  (Medicare Advantage and Commercial)

## 2021-06-09 ENCOUNTER — OFFICE VISIT (OUTPATIENT)
Dept: PODIATRY | Facility: CLINIC | Age: 76
End: 2021-06-09
Payer: COMMERCIAL

## 2021-06-09 VITALS
DIASTOLIC BLOOD PRESSURE: 71 MMHG | BODY MASS INDEX: 31.94 KG/M2 | SYSTOLIC BLOOD PRESSURE: 134 MMHG | WEIGHT: 241 LBS | HEIGHT: 73 IN | HEART RATE: 63 BPM | RESPIRATION RATE: 17 BRPM

## 2021-06-09 DIAGNOSIS — E11.42 DIABETIC POLYNEUROPATHY ASSOCIATED WITH TYPE 2 DIABETES MELLITUS (HCC): ICD-10-CM

## 2021-06-09 DIAGNOSIS — M77.42 METATARSALGIA OF BOTH FEET: Primary | ICD-10-CM

## 2021-06-09 DIAGNOSIS — B35.1 ONYCHOMYCOSIS: ICD-10-CM

## 2021-06-09 DIAGNOSIS — I70.209 PERIPHERAL ARTERIOSCLEROSIS (HCC): ICD-10-CM

## 2021-06-09 DIAGNOSIS — M77.41 METATARSALGIA OF BOTH FEET: Primary | ICD-10-CM

## 2021-06-09 DIAGNOSIS — B35.9 DERMATOPHYTOSIS: ICD-10-CM

## 2021-06-09 PROCEDURE — 99203 OFFICE O/P NEW LOW 30 MIN: CPT | Performed by: PODIATRIST

## 2021-06-09 RX ORDER — KETOCONAZOLE 20 MG/G
CREAM TOPICAL DAILY
Qty: 60 G | Refills: 1 | Status: SHIPPED | OUTPATIENT
Start: 2021-06-09 | End: 2021-10-14

## 2021-06-09 RX ORDER — GABAPENTIN 100 MG/1
100 CAPSULE ORAL 3 TIMES DAILY
Qty: 90 CAPSULE | Refills: 0 | Status: SHIPPED | OUTPATIENT
Start: 2021-06-09 | End: 2021-09-29 | Stop reason: SDUPTHER

## 2021-06-09 NOTE — PROGRESS NOTES
Assessment/Plan: metatarsalgia secondary to diabetic neuropathy and peripheral artery disease  Pain upon ambulation  Mycosis of nail and skin  Plan  Diabetic foot exam performed  Patient educated on care of the diabetic foot  All nails debrided  We will start the patient on topical antifungal   Arterial Doppler ordered  Patient will follow up with vascular surgery  We will increase gabapentin dosing  To 100 mg t i d   Patient return for follow-up  Diagnoses and all orders for this visit:    Metatarsalgia of both feet    Diabetic polyneuropathy associated with type 2 diabetes mellitus (HCC)  -     gabapentin (NEURONTIN) 100 mg capsule; Take 1 capsule (100 mg total) by mouth 3 (three) times a day    Peripheral arteriosclerosis (HCC)  -     VAS lower limb arterial duplex, complete bilateral; Future    Dermatophytosis  -     ketoconazole (NIZORAL) 2 % cream; Apply topically daily    Onychomycosis  -     ketoconazole (NIZORAL) 2 % cream; Apply topically daily          Subjective:   Patient is seen on referral   He is diabetic  He has been diagnosed with neuropathy  He believes he has poor circulation  Gets pain in his feet with ambulation  He gets cramping at night              Allergies   Allergen Reactions    Lisinopril Rash and Lip Swelling         Current Outpatient Medications:     amLODIPine (NORVASC) 5 mg tablet, Take 5 mg by mouth daily, Disp: , Rfl:     aspirin 81 mg chewable tablet, Chew 81 mg daily, Disp: , Rfl:     atorvastatin (LIPITOR) 40 mg tablet, Take 40 mg by mouth daily, Disp: , Rfl:     canagliflozin (Invokana) 100 mg, Take 100 mg by mouth daily before breakfast, Disp: , Rfl:     clopidogrel (PLAVIX) 75 mg tablet, Take 75 mg by mouth daily, Disp: , Rfl:     gabapentin (NEURONTIN) 100 mg capsule, Take 1 capsule (100 mg total) by mouth 2 (two) times a day, Disp: 60 capsule, Rfl: 5    gabapentin (NEURONTIN) 100 mg capsule, Take 1 capsule (100 mg total) by mouth 3 (three) times a day, Disp: 90 capsule, Rfl: 0    glimepiride (AMARYL) 2 mg tablet, Take 2 mg by mouth every morning before breakfast, Disp: , Rfl:     hydrochlorothiazide (HYDRODIURIL) 25 mg tablet, Take 25 mg by mouth daily, Disp: , Rfl:     ketoconazole (NIZORAL) 2 % cream, Apply topically daily, Disp: 60 g, Rfl: 1    losartan (COZAAR) 50 mg tablet, Take 100 mg by mouth daily , Disp: , Rfl:     metFORMIN (GLUCOPHAGE) 1000 MG tablet, Take 1,000 mg by mouth 2 (two) times a day with meals, Disp: , Rfl:     metoprolol succinate (TOPROL-XL) 50 mg 24 hr tablet, Take 50 mg by mouth daily, Disp: , Rfl:     Multiple Vitamins-Minerals (MULTIVITAMIN MEN 50+ PO), Take by mouth daily, Disp: , Rfl:     mupirocin (BACTROBAN) 2 % ointment, Apply topically 3 (three) times a day, Disp: 22 g, Rfl: 0    Omega-3 Fatty Acids (fish oil) 1,000 mg, Take 1,000 mg by mouth 2 (two) times a day, Disp: , Rfl:     sitaGLIPtin (JANUVIA) 100 mg tablet, Take 100 mg by mouth daily, Disp: , Rfl:     Patient Active Problem List   Diagnosis    Mixed hyperlipidemia    Essential hypertension    Coronary artery disease involving native coronary artery of native heart without angina pectoris    S/P angioplasty with stent    S/P CABG x 3    S/P AAA repair    S/P prostatectomy    H/O prostate cancer          Patient ID: Ilda Espinoza is a 64 y o  male  HPI    The following portions of the patient's history were reviewed and updated as appropriate: He  has a past medical history of CAD (coronary artery disease), Cancer ([facility] [address] ), CHF (congestive heart failure) ([facility] [address] ), Diabetes mellitus ([facility] [address] ), and Myocardial infarction ([facility] [address] )    He   Patient Active Problem List    Diagnosis Date Noted    Mixed hyperlipidemia 05/11/2021    Essential hypertension 05/11/2021    Coronary artery disease involving native coronary artery of native heart without angina pectoris 05/11/2021    S/P angioplasty with stent 05/11/2021    S/P CABG x 3 05/11/2021    S/P AAA repair 05/11/2021    S/P prostatectomy 05/11/2021    H/O prostate cancer 05/11/2021     He  has a past surgical history that includes Cardiac surgery (2002); Tonsillectomy; ADENOIDECTOMY; Coronary artery bypass graft; Cholecystectomy; and Prostate surgery  His family history includes Alcohol abuse in his father; Diabetes in his mother  He  reports that he quit smoking about 33 years ago  His smoking use included cigarettes  He has never used smokeless tobacco  He reports current alcohol use of about 14 0 standard drinks of alcohol per week  He reports that he does not use drugs    Current Outpatient Medications   Medication Sig Dispense Refill    amLODIPine (NORVASC) 5 mg tablet Take 5 mg by mouth daily      aspirin 81 mg chewable tablet Chew 81 mg daily      atorvastatin (LIPITOR) 40 mg tablet Take 40 mg by mouth daily      canagliflozin (Invokana) 100 mg Take 100 mg by mouth daily before breakfast      clopidogrel (PLAVIX) 75 mg tablet Take 75 mg by mouth daily      gabapentin (NEURONTIN) 100 mg capsule Take 1 capsule (100 mg total) by mouth 2 (two) times a day 60 capsule 5    gabapentin (NEURONTIN) 100 mg capsule Take 1 capsule (100 mg total) by mouth 3 (three) times a day 90 capsule 0    glimepiride (AMARYL) 2 mg tablet Take 2 mg by mouth every morning before breakfast      hydrochlorothiazide (HYDRODIURIL) 25 mg tablet Take 25 mg by mouth daily      ketoconazole (NIZORAL) 2 % cream Apply topically daily 60 g 1    losartan (COZAAR) 50 mg tablet Take 100 mg by mouth daily       metFORMIN (GLUCOPHAGE) 1000 MG tablet Take 1,000 mg by mouth 2 (two) times a day with meals      metoprolol succinate (TOPROL-XL) 50 mg 24 hr tablet Take 50 mg by mouth daily      Multiple Vitamins-Minerals (MULTIVITAMIN MEN 50+ PO) Take by mouth daily      mupirocin (BACTROBAN) 2 % ointment Apply topically 3 (three) times a day 22 g 0    Omega-3 Fatty Acids (fish oil) 1,000 mg Take 1,000 mg by mouth 2 (two) times a day      sitaGLIPtin (JANUVIA) 100 mg tablet Take 100 mg by mouth daily       No current facility-administered medications for this visit  Current Outpatient Medications on File Prior to Visit   Medication Sig    amLODIPine (NORVASC) 5 mg tablet Take 5 mg by mouth daily    aspirin 81 mg chewable tablet Chew 81 mg daily    atorvastatin (LIPITOR) 40 mg tablet Take 40 mg by mouth daily    canagliflozin (Invokana) 100 mg Take 100 mg by mouth daily before breakfast    clopidogrel (PLAVIX) 75 mg tablet Take 75 mg by mouth daily    gabapentin (NEURONTIN) 100 mg capsule Take 1 capsule (100 mg total) by mouth 2 (two) times a day    glimepiride (AMARYL) 2 mg tablet Take 2 mg by mouth every morning before breakfast    hydrochlorothiazide (HYDRODIURIL) 25 mg tablet Take 25 mg by mouth daily    losartan (COZAAR) 50 mg tablet Take 100 mg by mouth daily     metFORMIN (GLUCOPHAGE) 1000 MG tablet Take 1,000 mg by mouth 2 (two) times a day with meals    metoprolol succinate (TOPROL-XL) 50 mg 24 hr tablet Take 50 mg by mouth daily    Multiple Vitamins-Minerals (MULTIVITAMIN MEN 50+ PO) Take by mouth daily    mupirocin (BACTROBAN) 2 % ointment Apply topically 3 (three) times a day    Omega-3 Fatty Acids (fish oil) 1,000 mg Take 1,000 mg by mouth 2 (two) times a day    sitaGLIPtin (JANUVIA) 100 mg tablet Take 100 mg by mouth daily     No current facility-administered medications on file prior to visit  He is allergic to lisinopril       Vitals:    06/09/21 1027   BP: 134/71   Pulse: 63   Resp: 17       Review of Systems      Objective:  Patient's shoes and socks removed     Foot Exam    General  General Appearance: appears stated age and healthy   Orientation: alert and oriented to person, place, and time   Affect: appropriate   Gait: antalgic       Right Foot/Ankle     Inspection and Palpation  Tenderness: metatarsals   Swelling: none   Arch: pes planus  Claw Toes: fifth toe  Hallux limitus: yes  Skin Exam: callus, dry skin and tinea; Neurovascular  Dorsalis pedis: 1+  Posterior tibial: 1+  Saphenous nerve sensation: absent  Tibial nerve sensation: absent  Superficial peroneal nerve sensation: absent  Deep peroneal nerve sensation: absent  Sural nerve sensation: absent      Left Foot/Ankle      Inspection and Palpation  Tenderness: metatarsals   Swelling: none   Arch: pes planus  Claw toes: second toe and fifth toe  Hallux limitus: yes  Skin Exam: callus, dry skin and tinea; Neurovascular  Dorsalis pedis: 1+  Posterior tibial: 1+  Saphenous nerve sensation: absent  Tibial nerve sensation: absent  Superficial peroneal nerve sensation: absent  Deep peroneal nerve sensation: absent  Sural nerve sensation: absent        Physical Exam  Vitals signs and nursing note reviewed  Cardiovascular:      Rate and Rhythm: Normal rate and regular rhythm  Pulses:           Dorsalis pedis pulses are 1+ on the right side and 1+ on the left side  Posterior tibial pulses are 1+ on the right side and 1+ on the left side  Feet:      Right foot:      Skin integrity: Callus and dry skin present  Left foot:      Skin integrity: Callus and dry skin present  Skin:     Capillary Refill: Capillary refill takes 2 to 3 seconds  Comments:   All nails are thick and mycotic  Patient demonstrates bilateral dermatophytosis  There is a pre ulcerative/ corn on the plantar aspect 2nd left toe  Right Foot/Ankle   Right Foot Inspection  Skin Exam: dry skin, callus and callus                              Vascular    The right DP pulse is 1+  The right PT pulse is 1+  Right Toe  - Comprehensive Exam  Arch: pes planus  Claw Toes: fifth toe  Hallux limitus: yes  Swelling: none   Tenderness: metatarsals         Left Foot/Ankle  Left Foot Inspection  Skin Exam: dry skin and callus                                           Vascular    The left DP pulse is 1+  The left PT pulse is 1+     Left Toe  - Comprehensive Exam  Arch: pes planus  Claw toes: second toe and fifth toe  Hallux limitus: yes  Swelling: none   Tenderness: metatarsals

## 2021-06-10 PROBLEM — E11.42 TYPE 2 DIABETES MELLITUS WITH DIABETIC POLYNEUROPATHY, WITHOUT LONG-TERM CURRENT USE OF INSULIN (HCC): Status: ACTIVE | Noted: 2021-06-10

## 2021-06-15 ENCOUNTER — TELEPHONE (OUTPATIENT)
Dept: FAMILY MEDICINE CLINIC | Facility: CLINIC | Age: 76
End: 2021-06-15

## 2021-06-17 ENCOUNTER — VBI (OUTPATIENT)
Dept: ADMINISTRATIVE | Facility: OTHER | Age: 76
End: 2021-06-17

## 2021-06-17 LAB
LEFT EYE DIABETIC RETINOPATHY: NORMAL
RIGHT EYE DIABETIC RETINOPATHY: NORMAL

## 2021-06-18 ENCOUNTER — RA CDI HCC (OUTPATIENT)
Dept: OTHER | Facility: HOSPITAL | Age: 76
End: 2021-06-18

## 2021-06-18 NOTE — PROGRESS NOTES
[facility]  coding opportunities          Chart reviewed, no opportunity found: CHART REVIEWED, NO OPPORTUNITY FOUND                     Patients insurance company: [facility] [address]  (Medicare Advantage and Commercial)

## 2021-06-23 ENCOUNTER — HOSPITAL ENCOUNTER (OUTPATIENT)
Dept: RADIOLOGY | Facility: HOSPITAL | Age: 76
Discharge: HOME | End: 2021-06-23
Attending: PODIATRIST
Payer: COMMERCIAL

## 2021-06-23 DIAGNOSIS — I70.209 PERIPHERAL ARTERIOSCLEROSIS (HCC): ICD-10-CM

## 2021-06-23 PROCEDURE — 93923 UPR/LXTR ART STDY 3+ LVLS: CPT

## 2021-06-23 PROCEDURE — 93925 LOWER EXTREMITY STUDY: CPT | Performed by: SURGERY

## 2021-06-23 PROCEDURE — 93922 UPR/L XTREMITY ART 2 LEVELS: CPT | Performed by: SURGERY

## 2021-06-23 PROCEDURE — 93925 LOWER EXTREMITY STUDY: CPT

## 2021-06-24 ENCOUNTER — OFFICE VISIT (OUTPATIENT)
Dept: FAMILY MEDICINE CLINIC | Facility: CLINIC | Age: 76
End: 2021-06-24
Payer: COMMERCIAL

## 2021-06-24 VITALS
SYSTOLIC BLOOD PRESSURE: 130 MMHG | WEIGHT: 242 LBS | TEMPERATURE: 94.7 F | DIASTOLIC BLOOD PRESSURE: 80 MMHG | HEIGHT: 73 IN | BODY MASS INDEX: 32.07 KG/M2 | HEART RATE: 60 BPM | RESPIRATION RATE: 16 BRPM

## 2021-06-24 DIAGNOSIS — L98.491: Primary | ICD-10-CM

## 2021-06-24 DIAGNOSIS — I10 ESSENTIAL HYPERTENSION: ICD-10-CM

## 2021-06-24 DIAGNOSIS — Z00.00 MEDICARE ANNUAL WELLNESS VISIT, SUBSEQUENT: ICD-10-CM

## 2021-06-24 DIAGNOSIS — E11.42 TYPE 2 DIABETES MELLITUS WITH DIABETIC POLYNEUROPATHY, WITHOUT LONG-TERM CURRENT USE OF INSULIN (HCC): ICD-10-CM

## 2021-06-24 PROCEDURE — 3075F SYST BP GE 130 - 139MM HG: CPT | Performed by: FAMILY MEDICINE

## 2021-06-24 PROCEDURE — 3725F SCREEN DEPRESSION PERFORMED: CPT | Performed by: FAMILY MEDICINE

## 2021-06-24 PROCEDURE — 1125F AMNT PAIN NOTED PAIN PRSNT: CPT | Performed by: FAMILY MEDICINE

## 2021-06-24 PROCEDURE — 1170F FXNL STATUS ASSESSED: CPT | Performed by: FAMILY MEDICINE

## 2021-06-24 PROCEDURE — 3288F FALL RISK ASSESSMENT DOCD: CPT | Performed by: FAMILY MEDICINE

## 2021-06-24 PROCEDURE — 3079F DIAST BP 80-89 MM HG: CPT | Performed by: FAMILY MEDICINE

## 2021-06-24 PROCEDURE — G0439 PPPS, SUBSEQ VISIT: HCPCS | Performed by: FAMILY MEDICINE

## 2021-06-24 NOTE — PROGRESS NOTES
Assessment/Plan:    1  Skin ulcer of perirectal region, limited to breakdown of skin ([facility] [address] )    2  Type 2 diabetes mellitus with diabetic polyneuropathy, without long-term current use of insulin ([facility] [address] )    3  Essential hypertension  Assessment & Plan:  Better on recheck        Pts perirectal ulcer has cleared  Will finish abx  Pt will let me know if the ulcer becomes a problem again in the future    Patient Instructions       Medicare Preventive Visit Patient Instructions  Thank you for completing your Welcome to Medicare Visit or Medicare Annual Wellness Visit today  Your next wellness visit will be due in one year (6/25/2022)  The screening/preventive services that you may require over the next 5-10 years are detailed below  Some tests may not apply to you based off risk factors and/or age  Screening tests ordered at today's visit but not completed yet may show as past due  Also, please note that scanned in results may not display below  Preventive Screenings:  Service Recommendations Previous Testing/Comments   Colorectal Cancer Screening  · Colonoscopy    · Fecal Occult Blood Test (FOBT)/Fecal Immunochemical Test (FIT)  · Fecal DNA/Cologuard Test  · Flexible Sigmoidoscopy Age: 54-65 years old   Colonoscopy: every 10 years (May be performed more frequently if at higher risk)  OR  FOBT/FIT: every 1 year  OR  Cologuard: every 3 years  OR  Sigmoidoscopy: every 5 years  Screening may be recommended earlier than age 48 if at higher risk for colorectal cancer  Also, an individualized decision between you and your healthcare provider will decide whether screening between the ages of 74-80 would be appropriate   Colonoscopy: Not on file  FOBT/FIT: Not on file  Cologuard: Not on file  Sigmoidoscopy: Not on file          Prostate Cancer Screening Individualized decision between patient and health care provider in men between ages of 53-78   Medicare will cover every 12 months beginning on the day after your 50th birthday PSA: <0 1 ng/mL     History Prostate Cancer  Screening Not Indicated     Hepatitis C Screening Once for adults born between 1945 and 1965  More frequently in patients at high risk for Hepatitis C Hep C Antibody: Not on file        Diabetes Screening 1-2 times per year if you're at risk for diabetes or have pre-diabetes Fasting glucose: 139 mg/dL   A1C: 6 7 %    Screening Not Indicated  History Diabetes   Cholesterol Screening Once every 5 years if you don't have a lipid disorder  May order more often based on risk factors  Lipid panel: 06/02/2021    Screening Not Indicated  History Lipid Disorder      Other Preventive Screenings Covered by Medicare:  1  Abdominal Aortic Aneurysm (AAA) Screening: covered once if your at risk  You're considered to be at risk if you have a family history of AAA or a male between the age of 73-68 who smoking at least 100 cigarettes in your lifetime  2  Lung Cancer Screening: covers low dose CT scan once per year if you meet all of the following conditions: (1) Age 50-69; (2) No signs or symptoms of lung cancer; (3) Current smoker or have quit smoking within the last 15 years; (4) You have a tobacco smoking history of at least 30 pack years (packs per day x number of years you smoked); (5) You get a written order from a healthcare provider  3  Glaucoma Screening: covered annually if you're considered high risk: (1) You have diabetes OR (2) Family history of glaucoma OR (3)  aged 48 and older OR (3)  American aged 72 and older  3  Osteoporosis Screening: covered every 2 years if you meet one of the following conditions: (1) Have a vertebral abnormality; (2) On glucocorticoid therapy for more than 3 months; (3) Have primary hyperparathyroidism; (4) On osteoporosis medications and need to assess response to drug therapy  5  HIV Screening: covered annually if you're between the age of 12-76   Also covered annually if you are younger than 13 and older than 72 with risk factors for HIV infection  For pregnant patients, it is covered up to 3 times per pregnancy  Immunizations:  Immunization Recommendations   Influenza Vaccine Annual influenza vaccination during flu season is recommended for all persons aged >= 6 months who do not have contraindications   Pneumococcal Vaccine (Prevnar and Pneumovax)  * Prevnar = PCV13  * Pneumovax = PPSV23 Adults 25-60 years old: 1-3 doses may be recommended based on certain risk factors  Adults 72 years old: Prevnar (PCV13) vaccine recommended followed by Pneumovax (PPSV23) vaccine  If already received PPSV23 since turning 65, then PCV13 recommended at least one year after PPSV23 dose  Hepatitis B Vaccine 3 dose series if at intermediate or high risk (ex: diabetes, end stage renal disease, liver disease)   Tetanus (Td) Vaccine - COST NOT COVERED BY MEDICARE PART B Following completion of primary series, a booster dose should be given every 10 years to maintain immunity against tetanus  Td may also be given as tetanus wound prophylaxis  Tdap Vaccine - COST NOT COVERED BY MEDICARE PART B Recommended at least once for all adults  For pregnant patients, recommended with each pregnancy  Shingles Vaccine (Shingrix) - COST NOT COVERED BY MEDICARE PART B  2 shot series recommended in those aged 48 and above     Health Maintenance Due:      Topic Date Due    Hepatitis C Screening  Never done     Immunizations Due:      Topic Date Due    Pneumococcal Vaccine: 65+ Years (1 of 2 - PPSV23) Never done    COVID-19 Vaccine (1) Never done    DTaP,Tdap,and Td Vaccines (1 - Tdap) Never done    Influenza Vaccine (Season Ended) 09/01/2021     Advance Directives   What are advance directives? Advance directives are legal documents that state your wishes and plans for medical care  These plans are made ahead of time in case you lose your ability to make decisions for yourself   Advance directives can apply to any medical decision, such as the treatments you want, and if you want to donate organs  What are the types of advance directives? There are many types of advance directives, and each state has rules about how to use them  You may choose a combination of any of the following:  · Living will: This is a written record of the treatment you want  You can also choose which treatments you do not want, which to limit, and which to stop at a certain time  This includes surgery, medicine, IV fluid, and tube feedings  · Durable power of  for healthcare [facility]): This is a written record that states who you want to make healthcare choices for you when you are unable to make them for yourself  This person, called a proxy, is usually a family member or a friend  You may choose more than 1 proxy  · Do not resuscitate (DNR) order:  A DNR order is used in case your heart stops beating or you stop breathing  It is a request not to have certain forms of treatment, such as CPR  A DNR order may be included in other types of advance directives  · Medical directive: This covers the care that you want if you are in a coma, near death, or unable to make decisions for yourself  You can list the treatments you want for each condition  Treatment may include pain medicine, surgery, blood transfusions, dialysis, IV or tube feedings, and a ventilator (breathing machine)  · Values history: This document has questions about your views, beliefs, and how you feel and think about life  This information can help others choose the care that you would choose  Why are advance directives important? An advance directive helps you control your care  Although spoken wishes may be used, it is better to have your wishes written down  Spoken wishes can be misunderstood, or not followed  Treatments may be given even if you do not want them  An advance directive may make it easier for your family to make difficult choices about your care     Fall Prevention    Fall prevention includes ways to make your home and other areas safer  It also includes ways you can move more carefully to prevent a fall  Health conditions that cause changes in your blood pressure, vision, or muscle strength and coordination may increase your risk for falls  Medicines may also increase your risk for falls if they make you dizzy, weak, or sleepy  Fall prevention tips:   · Stand or sit up slowly  · Use assistive devices as directed  · Wear shoes that fit well and have soles that   · Wear a personal alarm  · Stay active  · Manage your medical conditions  Home Safety Tips:  · Add items to prevent falls in the bathroom  · Keep paths clear  · Install bright lights in your home  · Keep items you use often on shelves within reach  · Paint or place reflective tape on the edges of your stairs  Weight Management   Why it is important to manage your weight:  Being overweight increases your risk of health conditions such as heart disease, high blood pressure, type 2 diabetes, and certain types of cancer  It can also increase your risk for osteoarthritis, sleep apnea, and other respiratory problems  Aim for a slow, steady weight loss  Even a small amount of weight loss can lower your risk of health problems  How to lose weight safely:  A safe and healthy way to lose weight is to eat fewer calories and get regular exercise  You can lose up about 1 pound a week by decreasing the number of calories you eat by 500 calories each day  Healthy meal plan for weight management:  A healthy meal plan includes a variety of foods, contains fewer calories, and helps you stay healthy  A healthy meal plan includes the following:  · Eat whole-grain foods more often  A healthy meal plan should contain fiber  Fiber is the part of grains, fruits, and vegetables that is not broken down by your body  Whole-grain foods are healthy and provide extra fiber in your diet   Some examples of whole-grain foods are whole-wheat breads and pastas, oatmeal, brown rice, and bulgur  · Eat a variety of vegetables every day  Include dark, leafy greens such as spinach, kale, deloris greens, and mustard greens  Eat yellow and orange vegetables such as carrots, sweet potatoes, and winter squash  · Eat a variety of fruits every day  Choose fresh or canned fruit (canned in its own juice or light syrup) instead of juice  Fruit juice has very little or no fiber  · Eat low-fat dairy foods  Drink fat-free (skim) milk or 1% milk  Eat fat-free yogurt and low-fat cottage cheese  Try low-fat cheeses such as mozzarella and other reduced-fat cheeses  · Choose meat and other protein foods that are low in fat  Choose beans or other legumes such as split peas or lentils  Choose fish, skinless poultry (chicken or turkey), or lean cuts of red meat (beef or pork)  Before you cook meat or poultry, cut off any visible fat  · Use less fat and oil  Try baking foods instead of frying them  Add less fat, such as margarine, sour cream, regular salad dressing and mayonnaise to foods  Eat fewer high-fat foods  Some examples of high-fat foods include french fries, doughnuts, ice cream, and cakes  · Eat fewer sweets  Limit foods and drinks that are high in sugar  This includes candy, cookies, regular soda, and sweetened drinks  Exercise:  Exercise at least 30 minutes per day on most days of the week  Some examples of exercise include walking, biking, dancing, and swimming  You can also fit in more physical activity by taking the stairs instead of the elevator or parking farther away from stores  Ask your healthcare provider about the best exercise plan for you  © Copyright [employer] 2018 Information is for End User's use only and may not be sold, redistributed or otherwise used for commercial purposes   All illustrations and images included in CareNotes® are the copyrighted property of [employer] M , Inc  or Leticia Hsu for Next scheduled follow up  Subjective:      Patient ID: Rosa M Gilliam is a 64 y o  male  Chief Complaint   Patient presents with    Follow-up       Pt is here o follow up the ulcer on his buttock  States iot s gone  Pt feels well  No CP no SOB  No Chills      The following portions of the patient's history were reviewed and updated as appropriate: allergies, current medications, past family history, past medical history, past social history, past surgical history and problem list     Review of Systems   Constitutional: Negative for activity change, appetite change, chills, diaphoresis, fatigue, fever and unexpected weight change  HENT: Negative for congestion, dental problem, ear pain, mouth sores, sinus pressure, sinus pain, sore throat and trouble swallowing  Eyes: Negative for photophobia, discharge and itching  Respiratory: Negative for apnea, chest tightness and shortness of breath  Cardiovascular: Negative for chest pain, palpitations and leg swelling  Gastrointestinal: Negative for abdominal distention, abdominal pain, blood in stool, nausea and vomiting  Endocrine: Negative for cold intolerance, heat intolerance, polydipsia, polyphagia and polyuria  Genitourinary: Negative for difficulty urinating  Musculoskeletal: Negative for arthralgias  Skin: Negative for color change and wound (resolved)  Neurological: Negative for dizziness, syncope, speech difficulty and headaches  Hematological: Negative for adenopathy  Psychiatric/Behavioral: Negative for agitation and behavioral problems           Current Outpatient Medications   Medication Sig Dispense Refill    amLODIPine (NORVASC) 5 mg tablet Take 5 mg by mouth daily      aspirin 81 mg chewable tablet Chew 81 mg daily      atorvastatin (LIPITOR) 40 mg tablet Take 40 mg by mouth daily      canagliflozin (Invokana) 100 mg Take 100 mg by mouth daily before breakfast      clopidogrel (PLAVIX) 75 mg tablet Take 75 mg by mouth daily  gabapentin (NEURONTIN) 100 mg capsule Take 1 capsule (100 mg total) by mouth 3 (three) times a day 90 capsule 0    glimepiride (AMARYL) 2 mg tablet Take 2 mg by mouth every morning before breakfast      hydrochlorothiazide (HYDRODIURIL) 25 mg tablet Take 25 mg by mouth daily      ketoconazole (NIZORAL) 2 % cream Apply topically daily 60 g 1    losartan (COZAAR) 50 mg tablet Take 100 mg by mouth daily       metFORMIN (GLUCOPHAGE) 1000 MG tablet Take 1,000 mg by mouth 2 (two) times a day with meals      metoprolol succinate (TOPROL-XL) 50 mg 24 hr tablet Take 50 mg by mouth daily      Multiple Vitamins-Minerals (MULTIVITAMIN MEN 50+ PO) Take by mouth daily      Omega-3 Fatty Acids (fish oil) 1,000 mg Take 1,000 mg by mouth 2 (two) times a day      sitaGLIPtin (JANUVIA) 100 mg tablet Take 100 mg by mouth daily       No current facility-administered medications for this visit  Objective:    /80   Pulse 60   Temp (!) 94 7 °F (34 8 °C)   Resp 16   Ht 6' 1" (1 854 m)   Wt 110 kg (242 lb)   BMI 31 93 kg/m²        Physical Exam  Vitals and nursing note reviewed  Constitutional:       General: He is not in acute distress  Appearance: He is well-developed  He is not diaphoretic  HENT:      Head: Normocephalic and atraumatic  Right Ear: External ear normal       Left Ear: External ear normal       Nose: Nose normal       Mouth/Throat:      Pharynx: No oropharyngeal exudate  Eyes:      General: No scleral icterus  Right eye: No discharge  Left eye: No discharge  Pupils: Pupils are equal, round, and reactive to light  Neck:      Thyroid: No thyromegaly  Cardiovascular:      Rate and Rhythm: Normal rate  Heart sounds: Normal heart sounds  No murmur heard  Pulmonary:      Effort: Pulmonary effort is normal  No respiratory distress  Breath sounds: Normal breath sounds  No wheezing     Abdominal:      General: Bowel sounds are normal  There is no distension  Palpations: Abdomen is soft  There is no mass  Tenderness: There is no abdominal tenderness  There is no guarding or rebound  Musculoskeletal:         General: Normal range of motion  Skin:     General: Skin is warm and dry  Findings: No erythema or rash  Neurological:      Mental Status: He is alert        Coordination: Coordination normal       Deep Tendon Reflexes: Reflexes normal    Psychiatric:         Behavior: Behavior normal                 Ian Moore DO

## 2021-06-24 NOTE — PATIENT INSTRUCTIONS
Medicare Preventive Visit Patient Instructions  Thank you for completing your Welcome to Medicare Visit or Medicare Annual Wellness Visit today  Your next wellness visit will be due in one year (6/25/2022)  The screening/preventive services that you may require over the next 5-10 years are detailed below  Some tests may not apply to you based off risk factors and/or age  Screening tests ordered at today's visit but not completed yet may show as past due  Also, please note that scanned in results may not display below  Preventive Screenings:  Service Recommendations Previous Testing/Comments   Colorectal Cancer Screening  · Colonoscopy    · Fecal Occult Blood Test (FOBT)/Fecal Immunochemical Test (FIT)  · Fecal DNA/Cologuard Test  · Flexible Sigmoidoscopy Age: 54-65 years old   Colonoscopy: every 10 years (May be performed more frequently if at higher risk)  OR  FOBT/FIT: every 1 year  OR  Cologuard: every 3 years  OR  Sigmoidoscopy: every 5 years  Screening may be recommended earlier than age 48 if at higher risk for colorectal cancer  Also, an individualized decision between you and your healthcare provider will decide whether screening between the ages of 74-80 would be appropriate   Colonoscopy: Not on file  FOBT/FIT: Not on file  Cologuard: Not on file  Sigmoidoscopy: Not on file          Prostate Cancer Screening Individualized decision between patient and health care provider in men between ages of 53-78   Medicare will cover every 12 months beginning on the day after your 50th birthday PSA: <0 1 ng/mL     History Prostate Cancer  Screening Not Indicated     Hepatitis C Screening Once for adults born between 1945 and 1965  More frequently in patients at high risk for Hepatitis C Hep C Antibody: Not on file        Diabetes Screening 1-2 times per year if you're at risk for diabetes or have pre-diabetes Fasting glucose: 139 mg/dL   A1C: 6 7 %    Screening Not Indicated  History Diabetes   Cholesterol Screening Once every 5 years if you don't have a lipid disorder  May order more often based on risk factors  Lipid panel: 06/02/2021    Screening Not Indicated  History Lipid Disorder      Other Preventive Screenings Covered by Medicare:  1  Abdominal Aortic Aneurysm (AAA) Screening: covered once if your at risk  You're considered to be at risk if you have a family history of AAA or a male between the age of 73-68 who smoking at least 100 cigarettes in your lifetime  2  Lung Cancer Screening: covers low dose CT scan once per year if you meet all of the following conditions: (1) Age 50-69; (2) No signs or symptoms of lung cancer; (3) Current smoker or have quit smoking within the last 15 years; (4) You have a tobacco smoking history of at least 30 pack years (packs per day x number of years you smoked); (5) You get a written order from a healthcare provider  3  Glaucoma Screening: covered annually if you're considered high risk: (1) You have diabetes OR (2) Family history of glaucoma OR (3)  aged 48 and older OR (3)  American aged 72 and older  3  Osteoporosis Screening: covered every 2 years if you meet one of the following conditions: (1) Have a vertebral abnormality; (2) On glucocorticoid therapy for more than 3 months; (3) Have primary hyperparathyroidism; (4) On osteoporosis medications and need to assess response to drug therapy  5  HIV Screening: covered annually if you're between the age of 12-76  Also covered annually if you are younger than 13 and older than 72 with risk factors for HIV infection  For pregnant patients, it is covered up to 3 times per pregnancy      Immunizations:  Immunization Recommendations   Influenza Vaccine Annual influenza vaccination during flu season is recommended for all persons aged >= 6 months who do not have contraindications   Pneumococcal Vaccine (Prevnar and Pneumovax)  * Prevnar = PCV13  * Pneumovax = PPSV23 Adults 25-60 years old: 1-3 doses may be recommended based on certain risk factors  Adults 72 years old: Prevnar (PCV13) vaccine recommended followed by Pneumovax (PPSV23) vaccine  If already received PPSV23 since turning 65, then PCV13 recommended at least one year after PPSV23 dose  Hepatitis B Vaccine 3 dose series if at intermediate or high risk (ex: diabetes, end stage renal disease, liver disease)   Tetanus (Td) Vaccine - COST NOT COVERED BY MEDICARE PART B Following completion of primary series, a booster dose should be given every 10 years to maintain immunity against tetanus  Td may also be given as tetanus wound prophylaxis  Tdap Vaccine - COST NOT COVERED BY MEDICARE PART B Recommended at least once for all adults  For pregnant patients, recommended with each pregnancy  Shingles Vaccine (Shingrix) - COST NOT COVERED BY MEDICARE PART B  2 shot series recommended in those aged 48 and above     Health Maintenance Due:      Topic Date Due    Hepatitis C Screening  Never done     Immunizations Due:      Topic Date Due    Pneumococcal Vaccine: 65+ Years (1 of 2 - PPSV23) Never done    COVID-19 Vaccine (1) Never done    DTaP,Tdap,and Td Vaccines (1 - Tdap) Never done    Influenza Vaccine (Season Ended) 09/01/2021     Advance Directives   What are advance directives? Advance directives are legal documents that state your wishes and plans for medical care  These plans are made ahead of time in case you lose your ability to make decisions for yourself  Advance directives can apply to any medical decision, such as the treatments you want, and if you want to donate organs  What are the types of advance directives? There are many types of advance directives, and each state has rules about how to use them  You may choose a combination of any of the following:  · Living will: This is a written record of the treatment you want  You can also choose which treatments you do not want, which to limit, and which to stop at a certain time   This includes surgery, medicine, IV fluid, and tube feedings  · Durable power of  for healthcare [facility]): This is a written record that states who you want to make healthcare choices for you when you are unable to make them for yourself  This person, called a proxy, is usually a family member or a friend  You may choose more than 1 proxy  · Do not resuscitate (DNR) order:  A DNR order is used in case your heart stops beating or you stop breathing  It is a request not to have certain forms of treatment, such as CPR  A DNR order may be included in other types of advance directives  · Medical directive: This covers the care that you want if you are in a coma, near death, or unable to make decisions for yourself  You can list the treatments you want for each condition  Treatment may include pain medicine, surgery, blood transfusions, dialysis, IV or tube feedings, and a ventilator (breathing machine)  · Values history: This document has questions about your views, beliefs, and how you feel and think about life  This information can help others choose the care that you would choose  Why are advance directives important? An advance directive helps you control your care  Although spoken wishes may be used, it is better to have your wishes written down  Spoken wishes can be misunderstood, or not followed  Treatments may be given even if you do not want them  An advance directive may make it easier for your family to make difficult choices about your care  Fall Prevention    Fall prevention  includes ways to make your home and other areas safer  It also includes ways you can move more carefully to prevent a fall  Health conditions that cause changes in your blood pressure, vision, or muscle strength and coordination may increase your risk for falls  Medicines may also increase your risk for falls if they make you dizzy, weak, or sleepy  Fall prevention tips:   · Stand or sit up slowly      · Use assistive devices as directed  · Wear shoes that fit well and have soles that   · Wear a personal alarm  · Stay active  · Manage your medical conditions  Home Safety Tips:  · Add items to prevent falls in the bathroom  · Keep paths clear  · Install bright lights in your home  · Keep items you use often on shelves within reach  · Paint or place reflective tape on the edges of your stairs  Weight Management   Why it is important to manage your weight:  Being overweight increases your risk of health conditions such as heart disease, high blood pressure, type 2 diabetes, and certain types of cancer  It can also increase your risk for osteoarthritis, sleep apnea, and other respiratory problems  Aim for a slow, steady weight loss  Even a small amount of weight loss can lower your risk of health problems  How to lose weight safely:  A safe and healthy way to lose weight is to eat fewer calories and get regular exercise  You can lose up about 1 pound a week by decreasing the number of calories you eat by 500 calories each day  Healthy meal plan for weight management:  A healthy meal plan includes a variety of foods, contains fewer calories, and helps you stay healthy  A healthy meal plan includes the following:  · Eat whole-grain foods more often  A healthy meal plan should contain fiber  Fiber is the part of grains, fruits, and vegetables that is not broken down by your body  Whole-grain foods are healthy and provide extra fiber in your diet  Some examples of whole-grain foods are whole-wheat breads and pastas, oatmeal, brown rice, and bulgur  · Eat a variety of vegetables every day  Include dark, leafy greens such as spinach, kale, deloris greens, and mustard greens  Eat yellow and orange vegetables such as carrots, sweet potatoes, and winter squash  · Eat a variety of fruits every day  Choose fresh or canned fruit (canned in its own juice or light syrup) instead of juice   Fruit juice has very little or no fiber  · Eat low-fat dairy foods  Drink fat-free (skim) milk or 1% milk  Eat fat-free yogurt and low-fat cottage cheese  Try low-fat cheeses such as mozzarella and other reduced-fat cheeses  · Choose meat and other protein foods that are low in fat  Choose beans or other legumes such as split peas or lentils  Choose fish, skinless poultry (chicken or turkey), or lean cuts of red meat (beef or pork)  Before you cook meat or poultry, cut off any visible fat  · Use less fat and oil  Try baking foods instead of frying them  Add less fat, such as margarine, sour cream, regular salad dressing and mayonnaise to foods  Eat fewer high-fat foods  Some examples of high-fat foods include french fries, doughnuts, ice cream, and cakes  · Eat fewer sweets  Limit foods and drinks that are high in sugar  This includes candy, cookies, regular soda, and sweetened drinks  Exercise:  Exercise at least 30 minutes per day on most days of the week  Some examples of exercise include walking, biking, dancing, and swimming  You can also fit in more physical activity by taking the stairs instead of the elevator or parking farther away from stores  Ask your healthcare provider about the best exercise plan for you  © Copyright [employer] 2018 Information is for End User's use only and may not be sold, redistributed or otherwise used for commercial purposes   All illustrations and images included in CareNotes® are the copyrighted property of A D A M , Inc  or [address] [place] [address]

## 2021-06-24 NOTE — PROGRESS NOTES
Assessment and Plan:     Problem List Items Addressed This Visit        Endocrine    Type 2 diabetes mellitus with diabetic polyneuropathy, without long-term current use of insulin ([facility] )       Cardiovascular and Mediastinum    Essential hypertension     Better on recheck           Other Visit Diagnoses     Skin ulcer of perirectal region, limited to breakdown of skin ([facility] )    -  Primary    Medicare annual wellness visit, subsequent               Preventive health issues were discussed with patient, and age appropriate screening tests were ordered as noted in patient's After Visit Summary  Personalized health advice and appropriate referrals for health education or preventive services given if needed, as noted in patient's After Visit Summary       History of Present Illness:     Patient presents for Medicare Annual Wellness visit    Patient Care Team:  Marcy Parker DO as PCP - General (Family Medicine)  Bonnie López MD as Consulting Physician (Cardiology)  Gurmeet Painter MD as Consulting Physician (Ophthalmology)  Janny Manriquez DPM as Consulting Physician (Podiatry)  Ellie Wright MD as Consulting Physician (Vascular Surgery)     Problem List:     Patient Active Problem List   Diagnosis    Mixed hyperlipidemia    Essential hypertension    Coronary artery disease involving native coronary artery of native heart without angina pectoris    S/P angioplasty with stent    S/P CABG x 3    S/P AAA repair    S/P prostatectomy    H/O prostate cancer    Type 2 diabetes mellitus with diabetic polyneuropathy, without long-term current use of insulin ([facility] )      Past Medical and Surgical History:     Past Medical History:   Diagnosis Date    CAD (coronary artery disease)     Cancer ([facility] )     prostate    CHF (congestive heart failure) ([facility] )     Diabetes mellitus ([place] [address] )     Myocardial infarction [facility])      Past Surgical History:   Procedure Laterality Date    ADENOIDECTOMY      CARDIAC SURGERY  2002    3 cardiac bypass then angioplasty 2020    CHOLECYSTECTOMY      CORONARY ARTERY BYPASS GRAFT      PROSTATE SURGERY      TONSILLECTOMY        Family History:     Family History   Problem Relation Age of Onset    Diabetes Mother     Alcohol abuse Father     Mental illness Neg Hx       Social History:     Social History     Socioeconomic History    Marital status: Unknown     Spouse name: None    Number of children: None    Years of education: None    Highest education level: None   Occupational History    None   Tobacco Use    Smoking status: Former Smoker     Types: Cigarettes     Quit date:      Years since quittin 5    Smokeless tobacco: Never Used   Vaping Use    Vaping Use: Never used   Substance and Sexual Activity    Alcohol use: Yes     Alcohol/week: 14 0 standard drinks     Types: 14 Cans of beer per week    Drug use: Never    Sexual activity: None   Other Topics Concern    None   Social History Narrative    None     Social Determinants of Health     Financial Resource Strain:     Difficulty of Paying Living Expenses:    Food Insecurity:     Worried About Running Out of Food in the Last Year:     Ran Out of Food in the Last Year:    Transportation Needs:     Lack of Transportation (Medical):      Lack of Transportation (Non-Medical):    Physical Activity:     Days of Exercise per Week:     Minutes of Exercise per Session:    Stress:     Feeling of Stress :    Social Connections:     Frequency of Communication with Friends and Family:     Frequency of Social Gatherings with Friends and Family:     Attends Hinduism Services:     Active Member of Clubs or Organizations:     Attends Club or Organization Meetings:     Marital Status:    Intimate Partner Violence:     Fear of Current or Ex-Partner:     Emotionally Abused:     Physically Abused:     Sexually Abused:       Medications and Allergies:     Current Outpatient Medications   Medication Sig Dispense Refill    amLODIPine (NORVASC) 5 mg tablet Take 5 mg by mouth daily      aspirin 81 mg chewable tablet Chew 81 mg daily      atorvastatin (LIPITOR) 40 mg tablet Take 40 mg by mouth daily      canagliflozin (Invokana) 100 mg Take 100 mg by mouth daily before breakfast      clopidogrel (PLAVIX) 75 mg tablet Take 75 mg by mouth daily      gabapentin (NEURONTIN) 100 mg capsule Take 1 capsule (100 mg total) by mouth 3 (three) times a day 90 capsule 0    glimepiride (AMARYL) 2 mg tablet Take 2 mg by mouth every morning before breakfast      hydrochlorothiazide (HYDRODIURIL) 25 mg tablet Take 25 mg by mouth daily      ketoconazole (NIZORAL) 2 % cream Apply topically daily 60 g 1    losartan (COZAAR) 50 mg tablet Take 100 mg by mouth daily       metFORMIN (GLUCOPHAGE) 1000 MG tablet Take 1,000 mg by mouth 2 (two) times a day with meals      metoprolol succinate (TOPROL-XL) 50 mg 24 hr tablet Take 50 mg by mouth daily      Multiple Vitamins-Minerals (MULTIVITAMIN MEN 50+ PO) Take by mouth daily      Omega-3 Fatty Acids (fish oil) 1,000 mg Take 1,000 mg by mouth 2 (two) times a day      sitaGLIPtin (JANUVIA) 100 mg tablet Take 100 mg by mouth daily       No current facility-administered medications for this visit  Allergies   Allergen Reactions    Lisinopril Rash and Lip Swelling      Immunizations: There is no immunization history on file for this patient  Health Maintenance:         Topic Date Due    Hepatitis C Screening  Never done         Topic Date Due    Pneumococcal Vaccine: 65+ Years (1 of 2 - PPSV23) Never done    COVID-19 Vaccine (1) Never done    DTaP,Tdap,and Td Vaccines (1 - Tdap) Never done    Influenza Vaccine (Season Ended) 09/01/2021      Medicare Health Risk Assessment:     /80   Pulse 60   Temp (!) 94 7 °F (34 8 °C)   Resp 16   Ht 6' 1" (1 854 m)   Wt 110 kg (242 lb)   BMI 31 93 kg/m²      Charm Lefort is here for his Subsequent Wellness visit   Last Medicare Wellness visit information reviewed, patient interviewed, no change since last AWV  Health Risk Assessment:   Patient rates overall health as good  Patient feels that their physical health rating is same  Patient is very satisfied with their life  Eyesight was rated as same  Hearing was rated as same  Patient feels that their emotional and mental health rating is same  Patients states they are never, rarely angry  Patient states they are sometimes unusually tired/fatigued  Pain experienced in the last 7 days has been none  Patient states that he has experienced no weight loss or gain in last 6 months  Depression Screening:   PHQ-2 Score: 0      Fall Risk Screening: In the past year, patient has experienced: history of falling in past year    Number of falls: 2 or more  Injured during fall?: No    Feels unsteady when standing or walking?: No    Worried about falling?: No      Home Safety:  Patient does not have trouble with stairs inside or outside of their home  Patient has working smoke alarms and has working carbon monoxide detector  Home safety hazards include: none  Nutrition:   Current diet is Regular  Medications:   Patient is currently taking over-the-counter supplements  OTC medications include: see medication list  Patient is able to manage medications  Activities of Daily Living (ADLs)/Instrumental Activities of Daily Living (IADLs):   Walk and transfer into and out of bed and chair?: Yes  Dress and groom yourself?: Yes    Bathe or shower yourself?: Yes    Feed yourself?  Yes  Do your laundry/housekeeping?: No  Manage your money, pay your bills and track your expenses?: Yes  Make your own meals?: Yes    Do your own shopping?: Yes    Previous Hospitalizations:   Any hospitalizations or ED visits within the last 12 months?: Yes    How many hospitalizations have you had in the last year?: 1-2    Hospitalization Comments: Had stent placed/sas    Advance Care Planning:   Living will: Yes    Durable POA for healthcare: No    Advanced directive: Yes      Cognitive Screening:   Provider or family/friend/caregiver concerned regarding cognition?: No    PREVENTIVE SCREENINGS      Cardiovascular Screening:    General: Screening Not Indicated, History Lipid Disorder, Risks and Benefits Discussed and Screening Current      Diabetes Screening:     General: Screening Not Indicated, History Diabetes, Risks and Benefits Discussed and Screening Current      Colorectal Cancer Screening:     General: Risks and Benefits Discussed      Prostate Cancer Screening:    General: History Prostate Cancer and Screening Not Indicated      Abdominal Aortic Aneurysm (AAA) Screening:    Risk factors include: tobacco use        General: Screening Not Indicated and History AAA      Lung Cancer Screening:     General: Screening Not Indicated      Hepatitis C Screening:    General: Risks and Benefits Discussed and Patient Declines    Screening, Brief Intervention, and Referral to Treatment (SBIRT)    Screening  Typical number of drinks in a day: 1  Typical number of drinks in a week: 7  Interpretation: Low risk drinking behavior  Single Item Drug Screening:  How often have you used an illegal drug (including marijuana) or a prescription medication for non-medical reasons in the past year? never    Single Item Drug Screen Score: 0  Interpretation: Negative screen for possible drug use disorder    Brief Intervention  Alcohol & drug use screenings were reviewed  No concerns regarding substance use disorder identified         Alessandra Montelongo DO

## 2021-06-25 ENCOUNTER — CONSULT (OUTPATIENT)
Dept: VASCULAR SURGERY | Facility: CLINIC | Age: 76
End: 2021-06-25
Payer: COMMERCIAL

## 2021-06-25 ENCOUNTER — TELEPHONE (OUTPATIENT)
Dept: ADMINISTRATIVE | Facility: OTHER | Age: 76
End: 2021-06-25

## 2021-06-25 VITALS
HEART RATE: 52 BPM | DIASTOLIC BLOOD PRESSURE: 80 MMHG | BODY MASS INDEX: 32.07 KG/M2 | SYSTOLIC BLOOD PRESSURE: 134 MMHG | WEIGHT: 242 LBS | TEMPERATURE: 96.8 F | HEIGHT: 73 IN

## 2021-06-25 DIAGNOSIS — I73.9 PERIPHERAL ARTERY DISEASE (HCC): ICD-10-CM

## 2021-06-25 DIAGNOSIS — I83.92 VARICOSE VEINS OF LEFT LOWER EXTREMITY, UNSPECIFIED WHETHER COMPLICATED: ICD-10-CM

## 2021-06-25 DIAGNOSIS — Z95.828 HISTORY OF ENDOVASCULAR STENT GRAFT FOR ABDOMINAL AORTIC ANEURYSM (AAA): ICD-10-CM

## 2021-06-25 DIAGNOSIS — I25.10 CORONARY ARTERY DISEASE INVOLVING NATIVE CORONARY ARTERY OF NATIVE HEART WITHOUT ANGINA PECTORIS: ICD-10-CM

## 2021-06-25 DIAGNOSIS — Z95.820 S/P ANGIOPLASTY WITH STENT: ICD-10-CM

## 2021-06-25 DIAGNOSIS — E78.2 MIXED HYPERLIPIDEMIA: Primary | ICD-10-CM

## 2021-06-25 DIAGNOSIS — I10 ESSENTIAL HYPERTENSION: ICD-10-CM

## 2021-06-25 DIAGNOSIS — Z95.1 S/P CABG X 3: ICD-10-CM

## 2021-06-25 DIAGNOSIS — Z86.79 S/P AAA REPAIR: ICD-10-CM

## 2021-06-25 DIAGNOSIS — Z98.890 S/P AAA REPAIR: ICD-10-CM

## 2021-06-25 DIAGNOSIS — R09.89 BRUIT (ARTERIAL): ICD-10-CM

## 2021-06-25 PROCEDURE — 99204 OFFICE O/P NEW MOD 45 MIN: CPT | Performed by: SURGERY

## 2021-06-25 PROCEDURE — 1160F RVW MEDS BY RX/DR IN RCRD: CPT | Performed by: SURGERY

## 2021-06-25 PROCEDURE — 1036F TOBACCO NON-USER: CPT | Performed by: SURGERY

## 2021-06-25 NOTE — LETTER
June 25, 2021     Ian Moore DO  [address]  [address] [address]    Patient: Patricia Banks   YOB: 1945   Date of Visit: 6/25/2021       Dear Dr Madhuri Fitzpatrick:    Thank you for referring Patricia Banks to me for evaluation  Below are the relevant portions of my assessment and plan of care  Patient is new and referred by Augustin Putnam MD  Patient is here to establish care  Patient has h/o EVAR and at [address] [place] [address] in 2012 and a CABG done on 2002 at [facility] and [facility] one year ago at [address] [place] [address]  Patient states he underwent bilateral lower extremity arterial intervention at [facility], which did not improve his cramping symptoms  Patient denies wounds or ulcers  Patient c/o cramping in his legs and ankles @ night and during the day  Patient does exhibit evidence of chronic venous insufficiency marked by varicosities and bilateral lower extremity hemosiderin deposition  Patient c/o leg pain constantly Rt>Lt  Patient is taking 81mg, Lipitor, and Plavix  Patient is a former smoker    If you have questions, please do not hesitate to call me  I look forward to following Charm Lefort along with you           Sincerely,        Dakota Jackson MD        CC: Augustin Putnam MD

## 2021-06-25 NOTE — LETTER
Diabetic Eye Exam Form    Date Requested: 21  Patient: Buddy Gay  Patient : 1945   Referring Provider: Ember Dueñas,     Dilated Retinal Exam, Optomap-Iris Exam, or Fundus Photography Done         Yes ([place] one above)         No     Date of Diabetic Eye Exam ______________________________  Left Eye      Exam did show retinopathy    Exam did not show retinopathy         Mild       Moderate       None       Proliferative       Severe     Right Eye     Exam did show retinopathy    Exam did not show retinopathy         Mild       Moderate       None       Proliferative       Severe     Comments __________________________________________________________    Practice Providing Exam ______________________________________________    Exam Performed By (print name) _______________________________________      Provider Signature ___________________________________________________      These reports are needed for  compliance    Please fax this completed form and a copy of the Diabetic Eye Exam report to our office located at [place] [address] as soon as possible to [phone] attention Priyanka Velásquez: Phone [phone]    We thank you for your assistance in treating our mutual patient

## 2021-06-25 NOTE — TELEPHONE ENCOUNTER
----- Message from Nicky Topete DO sent at 6/24/2021  8:23 AM EDT -----  Regarding: eye  Had eye exam a week ago by dr Anne Weaver we need for chart

## 2021-06-25 NOTE — ASSESSMENT & PLAN NOTE
Will obtain carotid duplex to rule out significant carotid atherosclerotic occlusive disease  Patient to return in 3 months to review

## 2021-06-25 NOTE — TELEPHONE ENCOUNTER
Upon review of the In Basket request and the patient's chart, initial outreach has been made via fax, please see Contacts section for details        [phone]    Thank you  Jennifer Leon MA

## 2021-06-25 NOTE — ASSESSMENT & PLAN NOTE
History of EVAR in 2012  Patient does not believe he has had surveillance within the last few years  Will obtain a surveillance duplex  Patient to return in 3 months to review

## 2021-06-25 NOTE — ASSESSMENT & PLAN NOTE
Patient with mild peripheral arterial occlusive disease  FABIOLA on the right 0 81  ABG on the left is 0 95  Duplex portion of the study shows diffuse plaque with tibioperoneal occlusive disease  This is not the etiology of his leg cramping

## 2021-06-25 NOTE — PROGRESS NOTES
Assessment/Plan:    History of endovascular stent graft for abdominal aortic aneurysm (AAA)  History of EVAR in 2012  Patient does not believe he has had surveillance within the last few years  Will obtain a surveillance duplex  Patient to return in 3 months to review  Bruit (arterial)  Will obtain carotid duplex to rule out significant carotid atherosclerotic occlusive disease  Patient to return in 3 months to review  Peripheral artery disease ([facility] )  Patient with mild peripheral arterial occlusive disease  FABIOLA on the right 0 81  ABG on the left is 0 95  Duplex portion of the study shows diffuse plaque with tibioperoneal occlusive disease  This is not the etiology of his leg cramping  Diagnoses and all orders for this visit:    Mixed hyperlipidemia  -     Compression Stocking  -     VAS abdominal aorta/iliacs; limited study; Future  -     VAS carotid complete study; Future    History of endovascular stent graft for abdominal aortic aneurysm (AAA)  -     Ambulatory referral to Vascular Surgery  -     Compression Stocking  -     VAS abdominal aorta/iliacs; limited study; Future  -     VAS carotid complete study; Future    Bruit (arterial)  -     Compression Stocking  -     VAS abdominal aorta/iliacs; limited study; Future  -     VAS carotid complete study; Future    Varicose veins of left lower extremity, unspecified whether complicated    S/P CABG x 3    S/P angioplasty with stent    S/P AAA repair    Peripheral artery disease ([facility] [address] )    Essential hypertension    Coronary artery disease involving native coronary artery of native heart without angina pectoris          Subjective:      Patient ID: Marisela Zamarripa is a 64 y o  male          Patient is new and referred by Hector Nieves MD  Patient is here to establish care  Patient has h/o EVAR and at [address] [place] [address] in 2012 and a CABG done on 2002 at [facility] and [facility] one year ago at [address] [place] [address]    Patient states he underwent bilateral lower extremity arterial intervention at [facility], which did not improve his cramping symptoms  Patient denies wounds or ulcers  Patient c/o cramping in his legs and ankles @ night and during the day  Patient does exhibit evidence of chronic venous insufficiency marked by varicosities and bilateral lower extremity hemosiderin deposition  Patient c/o leg pain constantly Rt>Lt  Patient is taking 81mg, Lipitor, and Plavix  Patient is a former smoker      The following portions of the patient's history were reviewed and updated as appropriate: allergies, current medications, past family history, past medical history, past social history, past surgical history and problem list     Review of Systems   Constitutional: Negative  HENT: Negative  Eyes: Negative  Respiratory: Negative  Cardiovascular: Negative  BLE leg pain   Gastrointestinal: Negative  Endocrine: Negative  Genitourinary: Negative  Musculoskeletal: Negative  Skin: Negative  Allergic/Immunologic: Negative  Neurological: Negative  Hematological: Negative  Psychiatric/Behavioral: Negative  Objective:      /80 (BP Location: Left arm, Patient Position: Sitting, Cuff Size: Standard)   Pulse (!) 52   Temp (!) 96 8 °F (36 °C) (Tympanic)   Ht 6' 1" (1 854 m)   Wt 110 kg (242 lb)   BMI 31 93 kg/m²          Physical Exam  Vitals and nursing note reviewed  Constitutional:       Appearance: He is well-developed  HENT:      Head: Normocephalic and atraumatic  Eyes:      Conjunctiva/sclera: Conjunctivae normal       Pupils: Pupils are equal, round, and reactive to light  Neck:      Vascular: No JVD  Cardiovascular:      Rate and Rhythm: Normal rate and regular rhythm  Heart sounds: Normal heart sounds  Comments: Palpable femorals bilaterally  DP/PT/P present by Doppler  Pulmonary:      Effort: Pulmonary effort is normal  No respiratory distress  Breath sounds: Normal breath sounds  No stridor  No wheezing or rales  Chest:      Chest wall: No tenderness  Abdominal:      General: There is no distension  Palpations: Abdomen is soft  There is no mass  Tenderness: There is no abdominal tenderness  There is no guarding or rebound  Comments: Aneurysm not palpated no tenderness   Musculoskeletal:         General: No tenderness or deformity  Normal range of motion  Cervical back: Normal range of motion and neck supple  Skin:     General: Skin is warm and dry  Findings: No erythema or rash  Comments: Warm appears well perfused  Hemosiderin deposition bilaterally  Large varicosities at the left medial thigh, calf and anterior leg   Neurological:      Mental Status: He is alert and oriented to person, place, and time  Psychiatric:         Behavior: Behavior normal          Thought Content:  Thought content normal

## 2021-06-25 NOTE — PATIENT INSTRUCTIONS
Undergo ultrasound of your abdominal aneurysm status post endovascular abdominal aortic aneurysm repair  Wear compression stockings daily  Can remove stockings at night while sleeping  Moderate exercise    Return in 3 months

## 2021-06-29 NOTE — TELEPHONE ENCOUNTER
As a follow-up, a second attempt has been made for outreach via fax, please see Contacts section for details           Thank you  Mckenzie Llanes MA

## 2021-07-01 ENCOUNTER — OFFICE VISIT (OUTPATIENT)
Dept: PODIATRY | Facility: CLINIC | Age: 76
End: 2021-07-01
Payer: COMMERCIAL

## 2021-07-01 VITALS
DIASTOLIC BLOOD PRESSURE: 88 MMHG | HEIGHT: 73 IN | SYSTOLIC BLOOD PRESSURE: 130 MMHG | RESPIRATION RATE: 17 BRPM | WEIGHT: 242 LBS | HEART RATE: 82 BPM | BODY MASS INDEX: 32.07 KG/M2

## 2021-07-01 DIAGNOSIS — M77.41 METATARSALGIA OF BOTH FEET: Primary | ICD-10-CM

## 2021-07-01 DIAGNOSIS — I70.209 PERIPHERAL ARTERIOSCLEROSIS (HCC): ICD-10-CM

## 2021-07-01 DIAGNOSIS — B35.9 DERMATOPHYTOSIS: ICD-10-CM

## 2021-07-01 DIAGNOSIS — M77.42 METATARSALGIA OF BOTH FEET: Primary | ICD-10-CM

## 2021-07-01 DIAGNOSIS — B35.1 ONYCHOMYCOSIS: ICD-10-CM

## 2021-07-01 DIAGNOSIS — E11.42 DIABETIC POLYNEUROPATHY ASSOCIATED WITH TYPE 2 DIABETES MELLITUS (HCC): ICD-10-CM

## 2021-07-01 PROCEDURE — 99213 OFFICE O/P EST LOW 20 MIN: CPT | Performed by: PODIATRIST

## 2021-07-01 RX ORDER — GABAPENTIN 300 MG/1
300 CAPSULE ORAL 2 TIMES DAILY
Qty: 60 CAPSULE | Refills: 1 | Status: SHIPPED | OUTPATIENT
Start: 2021-07-01 | End: 2021-10-14

## 2021-07-01 NOTE — PROGRESS NOTES
Assessment/Plan: metatarsalgia secondary to diabetic neuropathy  Mild peripheral artery disease  Mycosis of nail and skin, resolving  Pain upon ambulation  Plan  Case reviewed with patient  He understands both Podiatry and vascular surgery point of view  Today all abnormal skin debrided  Nails debrided  Will increase gabapentin dosing to 300 mg b i d  Diagnoses and all orders for this visit:    Metatarsalgia of both feet  -     gabapentin (NEURONTIN) 300 mg capsule; Take 1 capsule (300 mg total) by mouth 2 (two) times a day    Diabetic polyneuropathy associated with type 2 diabetes mellitus (HCC)  -     gabapentin (NEURONTIN) 300 mg capsule; Take 1 capsule (300 mg total) by mouth 2 (two) times a day    Peripheral arteriosclerosis (HCC)    Dermatophytosis    Onychomycosis          Subjective: All patient is doing better on present gabapentin regimen  He saw vascular surgery  No treatment at this time  He still gets pain in his feet  This occurs at night      Allergies   Allergen Reactions    Lisinopril Rash and Lip Swelling         Current Outpatient Medications:     amLODIPine (NORVASC) 5 mg tablet, Take 5 mg by mouth daily, Disp: , Rfl:     aspirin 81 mg chewable tablet, Chew 81 mg daily, Disp: , Rfl:     atorvastatin (LIPITOR) 40 mg tablet, Take 40 mg by mouth daily, Disp: , Rfl:     canagliflozin (Invokana) 100 mg, Take 100 mg by mouth daily before breakfast, Disp: , Rfl:     clopidogrel (PLAVIX) 75 mg tablet, Take 75 mg by mouth daily, Disp: , Rfl:     gabapentin (NEURONTIN) 100 mg capsule, Take 1 capsule (100 mg total) by mouth 3 (three) times a day, Disp: 90 capsule, Rfl: 0    gabapentin (NEURONTIN) 300 mg capsule, Take 1 capsule (300 mg total) by mouth 2 (two) times a day, Disp: 60 capsule, Rfl: 1    glimepiride (AMARYL) 2 mg tablet, Take 2 mg by mouth every morning before breakfast, Disp: , Rfl:     hydrochlorothiazide (HYDRODIURIL) 25 mg tablet, Take 25 mg by mouth daily, Disp: , Rfl:     ketoconazole (NIZORAL) 2 % cream, Apply topically daily, Disp: 60 g, Rfl: 1    losartan (COZAAR) 50 mg tablet, Take 100 mg by mouth daily , Disp: , Rfl:     metFORMIN (GLUCOPHAGE) 1000 MG tablet, Take 1,000 mg by mouth 2 (two) times a day with meals, Disp: , Rfl:     metoprolol succinate (TOPROL-XL) 50 mg 24 hr tablet, Take 50 mg by mouth daily, Disp: , Rfl:     Multiple Vitamins-Minerals (MULTIVITAMIN MEN 50+ PO), Take by mouth daily, Disp: , Rfl:     Omega-3 Fatty Acids (fish oil) 1,000 mg, Take 1,000 mg by mouth 2 (two) times a day, Disp: , Rfl:     sitaGLIPtin (JANUVIA) 100 mg tablet, Take 100 mg by mouth daily, Disp: , Rfl:     Patient Active Problem List   Diagnosis    Mixed hyperlipidemia    Essential hypertension    Coronary artery disease involving native coronary artery of native heart without angina pectoris    S/P angioplasty with stent    S/P CABG x 3    S/P AAA repair    S/P prostatectomy    H/O prostate cancer    Type 2 diabetes mellitus with diabetic polyneuropathy, without long-term current use of insulin (HCC)    Varicose veins of left lower extremity    History of endovascular stent graft for abdominal aortic aneurysm (AAA)    Bruit (arterial)    Peripheral artery disease ([facility] )          Patient ID: Tevin Chambers is a 64 y o  male  HPI    The following portions of the patient's history were reviewed and updated as appropriate:     family history includes Alcohol abuse in his father; Diabetes in his mother  reports that he quit smoking about 33 years ago  His smoking use included cigarettes  He has never used smokeless tobacco  He reports current alcohol use of about 14 0 standard drinks of alcohol per week  He reports that he does not use drugs  Vitals:    07/01/21 1022   BP: 130/88   Pulse: 82   Resp: 17       Review of Systems      Objective:  Patient's shoes and socks removed     Foot ExamPhysical Exam      General  General Appearance: appears stated age and healthy   Orientation: alert and oriented to person, place, and time   Affect: appropriate   Gait: antalgic         Right Foot/Ankle      Inspection and Palpation  Tenderness: metatarsals   Swelling: none   Arch: pes planus  Claw Toes: fifth toe  Hallux limitus: yes  Skin Exam: callus, dry skin and tinea;      Neurovascular  Dorsalis pedis: 1+  Posterior tibial: 1+  Saphenous nerve sensation: absent  Tibial nerve sensation: absent  Superficial peroneal nerve sensation: absent  Deep peroneal nerve sensation: absent  Sural nerve sensation: absent        Left Foot/Ankle       Inspection and Palpation  Tenderness: metatarsals   Swelling: none   Arch: pes planus  Claw toes: second toe and fifth toe  Hallux limitus: yes  Skin Exam: callus, dry skin and tinea;      Neurovascular  Dorsalis pedis: 1+  Posterior tibial: 1+  Saphenous nerve sensation: absent  Tibial nerve sensation: absent  Superficial peroneal nerve sensation: absent  Deep peroneal nerve sensation: absent  Sural nerve sensation: absent           Physical Exam  Vitals signs and nursing note reviewed  Cardiovascular:      Rate and Rhythm: Normal rate and regular rhythm  Pulses:           Dorsalis pedis pulses are 1+ on the right side and 1+ on the left side  Posterior tibial pulses are 1+ on the right side and 1+ on the left side  Feet:      Right foot:      Skin integrity: Callus and dry skin present  Left foot:      Skin integrity: Callus and dry skin present  Skin:     Capillary Refill: Capillary refill takes 2 to 3 seconds  Comments:   All nails are thick and mycotic  Patient demonstrates bilateral dermatophytosis  There is a pre ulcerative/ corn on the plantar aspect 2nd left toe  Right Foot/Ankle   Right Foot Inspection  Skin Exam: dry skin, callus and callus                                 Vascular     The right DP pulse is 1+  The right PT pulse is 1+     Right Toe  - Comprehensive Exam  Arch: pes planus  Claw Toes: fifth toe  Hallux limitus: yes  Swelling: none   Tenderness: metatarsals            Left Foot/Ankle  Left Foot Inspection  Skin Exam: dry skin and callus                                             Vascular     The left DP pulse is 1+  The left PT pulse is 1+     Left Toe  - Comprehensive Exam  Arch: pes planus  Claw toes: second toe and fifth toe  Hallux limitus: yes  Swelling: none   Tenderness: metatarsals

## 2021-07-23 NOTE — TELEPHONE ENCOUNTER
As a final attempt, a third outreach has been made via telephone call  Please see Contacts section for details  This encounter will be closed and completed by end of day  Should we receive the requested information because of previous outreach attempts, the requested patient's chart will be updated appropriately        Spoke to , she said they never recv'd request   Patient had dm eye 6-17-21  Will fax it right over to us    Thank you  Matthew Peterson MA

## 2021-07-27 NOTE — TELEPHONE ENCOUNTER
Upon review of the In Basket request we Never received requested report    Any additional questions or concerns should be emailed to the Practice Liaisons via [email or web address] email, please do not reply via In Basket      Thank you  Charmayne Huntington, MA

## 2021-08-18 ENCOUNTER — OFFICE VISIT (OUTPATIENT)
Dept: CARDIOLOGY CLINIC | Facility: CLINIC | Age: 76
End: 2021-08-18
Payer: COMMERCIAL

## 2021-08-18 VITALS
HEIGHT: 73 IN | BODY MASS INDEX: 33.13 KG/M2 | DIASTOLIC BLOOD PRESSURE: 60 MMHG | HEART RATE: 57 BPM | SYSTOLIC BLOOD PRESSURE: 140 MMHG | WEIGHT: 250 LBS | TEMPERATURE: 97.4 F | OXYGEN SATURATION: 98 % | RESPIRATION RATE: 18 BRPM

## 2021-08-18 DIAGNOSIS — I25.10 CORONARY ARTERY DISEASE INVOLVING NATIVE CORONARY ARTERY OF NATIVE HEART WITHOUT ANGINA PECTORIS: Primary | ICD-10-CM

## 2021-08-18 DIAGNOSIS — I10 ESSENTIAL HYPERTENSION: ICD-10-CM

## 2021-08-18 PROCEDURE — 1160F RVW MEDS BY RX/DR IN RCRD: CPT | Performed by: INTERNAL MEDICINE

## 2021-08-18 PROCEDURE — 99214 OFFICE O/P EST MOD 30 MIN: CPT | Performed by: INTERNAL MEDICINE

## 2021-08-18 PROCEDURE — 1036F TOBACCO NON-USER: CPT | Performed by: INTERNAL MEDICINE

## 2021-08-18 NOTE — PROGRESS NOTES
Progress Note - Cardiology Office  [facility]    Dar Coley 64 y o  male MRN: [id number]  : 1945  Encounter: [id number]    ASSESSMENT:  CAD,   s/p CABG X 3 in  at [facility],   s/p PCI in  at [facility]  On aspirin 81 mg, Plavix 75 mg, statin and beta-blocker  Denies chest pain or dyspnea     S/P Abdominal aortic aneurysm repair w/ Stent      Hypertension  BP today is 140/60 mmHg  On amlodipine 5 mg, hydrochlorothiazide 25 mg, losartan 50 mg, Toprol 50 mg,     Mixed hyperlipidemia  On fish oil 2 g daily, atorvastatin 40 mg  2021:  LDL 61, HDL 50, triglycerides 167, normal liver enzymes   > increase to 4 g     Diabetes mellitus type 2     Obesity:  BMI is 32 98     S/p prostatectomy for CA prostate     Open wound  Managed by PCP        RECOMMENDATIONS:  Increase fish oil to 4 g daily  Encouraged cardiovascular exercise, low-cholesterol low carbohydrate diet       Please call [phone] if any questions  HPI :     Dar Coley is a 64y o  year old male who came for follow up  Fadi Cuenca has been doing well and offers no cardiac symptoms    REVIEW OF SYSTEMS:  Denies any new or acute cardiac symptoms    Denies chest pain, palpitations, dyspnea or syncope    Historical Information   Past Medical History:   Diagnosis Date    CAD (coronary artery disease)     Cancer ([facility] [address] )     prostate    [facility]     Diabetes mellitus ([facility] )     Myocardial infarction [facility])      Past Surgical History:   Procedure Laterality Date    ADENOIDECTOMY      CARDIAC SURGERY      3 cardiac bypass then angioplasty 2020    CHOLECYSTECTOMY      CORONARY ARTERY BYPASS GRAFT      PROSTATE SURGERY      TONSILLECTOMY       Social History     Substance and Sexual Activity   Alcohol Use Yes    Alcohol/week: 14 0 standard drinks    Types: 14 Cans of beer per week     Social History     Substance and Sexual Activity   Drug Use Never Social History     Tobacco Use   Smoking Status Former Smoker    Types: Cigarettes    Quit date: 80    Years since quittin 6   Smokeless Tobacco Never Used     Family History:   Family History   Problem Relation Age of Onset    Diabetes Mother     Alcohol abuse Father     Mental illness Neg Hx        Meds/Allergies     Allergies   Allergen Reactions    Lisinopril Rash and Lip Swelling       Current Outpatient Medications:     amLODIPine (NORVASC) 5 mg tablet, Take 5 mg by mouth daily, Disp: , Rfl:     aspirin 81 mg chewable tablet, Chew 81 mg daily, Disp: , Rfl:     atorvastatin (LIPITOR) 40 mg tablet, Take 40 mg by mouth daily, Disp: , Rfl:     canagliflozin (Invokana) 100 mg, Take 100 mg by mouth daily before breakfast, Disp: , Rfl:     clopidogrel (PLAVIX) 75 mg tablet, Take 75 mg by mouth daily, Disp: , Rfl:     gabapentin (NEURONTIN) 100 mg capsule, Take 1 capsule (100 mg total) by mouth 3 (three) times a day, Disp: 90 capsule, Rfl: 0    gabapentin (NEURONTIN) 300 mg capsule, Take 1 capsule (300 mg total) by mouth 2 (two) times a day, Disp: 60 capsule, Rfl: 1    glimepiride (AMARYL) 2 mg tablet, Take 2 mg by mouth every morning before breakfast, Disp: , Rfl:     hydrochlorothiazide (HYDRODIURIL) 25 mg tablet, Take 25 mg by mouth daily, Disp: , Rfl:     ketoconazole (NIZORAL) 2 % cream, Apply topically daily, Disp: 60 g, Rfl: 1    losartan (COZAAR) 50 mg tablet, Take 100 mg by mouth daily , Disp: , Rfl:     metFORMIN (GLUCOPHAGE) 1000 MG tablet, Take 1,000 mg by mouth 2 (two) times a day with meals, Disp: , Rfl:     metoprolol succinate (TOPROL-XL) 50 mg 24 hr tablet, Take 50 mg by mouth daily, Disp: , Rfl:     Multiple Vitamins-Minerals (MULTIVITAMIN MEN 50+ PO), Take by mouth daily, Disp: , Rfl:     Omega-3 Fatty Acids (fish oil) 1,000 mg, Take 1,000 mg by mouth 2 (two) times a day, Disp: , Rfl:     sitaGLIPtin (JANUVIA) 100 mg tablet, Take 100 mg by mouth daily, Disp: , Rfl: Vitals:   BP is 140/60 mmHg  HR 57/Min  BP Readings from Last 3 Encounters:   07/01/21 130/88   06/25/21 134/80   06/24/21 130/80       Physical Exam:  Neurologic:  Alert & oriented x 3, no new focal deficits, Not in any acute distress,  Constitutional:  Well developed, well nourished, non-toxic appearance   Eyes:  Pupil equal and reacting to light, conjunctiva normal,   HENT:  Atraumatic, oropharynx moist, Neck- normal range of motion, no tenderness,  Neck supple, No JVP, No LNP   Respiratory:  Bilateral air entry, mostly clear to auscultation  Cardiovascular: S1-S2 regular with a I/VI ejection systolic murmur   GI:  Soft, nondistended, normal bowel sounds, nontender, no hepatosplenomegaly appreciated  Musculoskeletal:  No edema, no tenderness, no deformities  Skin:  Well hydrated, no rash   Lymphatic:  No lymphadenopathy noted   Extremities:  No edema and distal pulses are present            Imaging:  Chest X-Ray:   No Chest XR results available for this patient  CT-scan of the chest:     No CTA results available for this patient    Lab Review   No results found for: WBC, HGB, HCT, MCV, RDW, PLT  BMP:  Lab Results   Component Value Date    SODIUM 140 06/02/2021    K 4 0 06/02/2021     06/02/2021    CO2 28 06/02/2021    BUN 32 (H) 06/02/2021    CREATININE 1 18 06/02/2021    GLUF 139 (H) 06/02/2021    CALCIUM 9 4 06/02/2021    EGFR 59 06/02/2021     LFT:  Lab Results   Component Value Date    AST 29 06/02/2021    ALT 35 06/02/2021    ALKPHOS 44 (L) 06/02/2021    TP 7 8 06/02/2021    ALB 4 0 06/02/2021      No components found for: TSH3  No results found for: [facility]  Lab Results   Component Value Date    HGBA1C 6 7 (H) 06/02/2021     Lipid Profile:   Lab Results   Component Value Date    CHOLESTEROL 144 06/02/2021    HDL 50 06/02/2021    LDLCALC 61 06/02/2021    TRIG 167 (H) 06/02/2021     Lab Results   Component Value Date    CHOLESTEROL 144 06/02/2021     No results found for: CKTOTAL, CKMB, CKMBINDEX, TROPONINI  No results found for: NTBNP   No results found for this or any previous visit (from the past 672 hour(s))  Dr Hakeem Purdy MD, [facility]      "This note has been constructed using a voice recognition system  Therefore there may be syntax, spelling, and/or grammatical errors   Please call if you have any questions  "

## 2021-09-03 ENCOUNTER — OFFICE VISIT (OUTPATIENT)
Dept: PODIATRY | Facility: CLINIC | Age: 76
End: 2021-09-03
Payer: COMMERCIAL

## 2021-09-03 VITALS
WEIGHT: 250 LBS | RESPIRATION RATE: 17 BRPM | BODY MASS INDEX: 33.13 KG/M2 | HEIGHT: 73 IN | HEART RATE: 57 BPM | DIASTOLIC BLOOD PRESSURE: 60 MMHG | SYSTOLIC BLOOD PRESSURE: 140 MMHG

## 2021-09-03 DIAGNOSIS — B35.9 DERMATOPHYTOSIS: ICD-10-CM

## 2021-09-03 DIAGNOSIS — M54.16 RADICULOPATHY OF LUMBAR REGION: ICD-10-CM

## 2021-09-03 DIAGNOSIS — I70.209 PERIPHERAL ARTERIOSCLEROSIS (HCC): ICD-10-CM

## 2021-09-03 DIAGNOSIS — M77.42 METATARSALGIA OF BOTH FEET: Primary | ICD-10-CM

## 2021-09-03 DIAGNOSIS — M77.41 METATARSALGIA OF BOTH FEET: Primary | ICD-10-CM

## 2021-09-03 DIAGNOSIS — E11.42 DIABETIC POLYNEUROPATHY ASSOCIATED WITH TYPE 2 DIABETES MELLITUS (HCC): ICD-10-CM

## 2021-09-03 PROCEDURE — 99213 OFFICE O/P EST LOW 20 MIN: CPT | Performed by: PODIATRIST

## 2021-09-03 RX ORDER — GABAPENTIN 600 MG/1
600 TABLET ORAL 2 TIMES DAILY
Qty: 60 TABLET | Refills: 0 | Status: SHIPPED | OUTPATIENT
Start: 2021-09-03 | End: 2021-09-29 | Stop reason: SDUPTHER

## 2021-09-03 NOTE — PROGRESS NOTES
Assessment/Plan:   metatarsalgia secondary to diabetic neuropathy  Rule out radiculopathy  Mycosis of nail and skin  Plan  Foot exam performed  All nails debrided  All abnormal skin debrided  We will increase gabapentin dosing 600 mg b i d     We will check for post-laminectomy syndrome  Patient will have spine x-rays  Diagnoses and all orders for this visit:    Metatarsalgia of both feet  -     gabapentin (NEURONTIN) 600 MG tablet; Take 1 tablet (600 mg total) by mouth 2 (two) times a day    Diabetic polyneuropathy associated with type 2 diabetes mellitus (HCC)  -     gabapentin (NEURONTIN) 600 MG tablet; Take 1 tablet (600 mg total) by mouth 2 (two) times a day    Peripheral arteriosclerosis (HCC)    Dermatophytosis    Radiculopathy of lumbar region  -     XR spine lumbar complete w bending minimum 6 views; Future  -     gabapentin (NEURONTIN) 600 MG tablet; Take 1 tablet (600 mg total) by mouth 2 (two) times a day          Subjective:   Patient is doing somewhat better  He still gets pain in his legs  He has legs feel weak  Patient has history of laminectomy  He is diabetic      Allergies   Allergen Reactions    Lisinopril Rash and Lip Swelling         Current Outpatient Medications:     amLODIPine (NORVASC) 5 mg tablet, Take 5 mg by mouth daily, Disp: , Rfl:     aspirin 81 mg chewable tablet, Chew 81 mg daily, Disp: , Rfl:     atorvastatin (LIPITOR) 40 mg tablet, Take 40 mg by mouth daily, Disp: , Rfl:     canagliflozin (Invokana) 100 mg, Take 100 mg by mouth daily before breakfast, Disp: , Rfl:     clopidogrel (PLAVIX) 75 mg tablet, Take 75 mg by mouth daily, Disp: , Rfl:     gabapentin (NEURONTIN) 100 mg capsule, Take 1 capsule (100 mg total) by mouth 3 (three) times a day, Disp: 90 capsule, Rfl: 0    gabapentin (NEURONTIN) 300 mg capsule, Take 1 capsule (300 mg total) by mouth 2 (two) times a day, Disp: 60 capsule, Rfl: 1    gabapentin (NEURONTIN) 600 MG tablet, Take 1 tablet (600 mg total) by mouth 2 (two) times a day, Disp: 60 tablet, Rfl: 0    glimepiride (AMARYL) 2 mg tablet, Take 2 mg by mouth every morning before breakfast, Disp: , Rfl:     hydrochlorothiazide (HYDRODIURIL) 25 mg tablet, Take 25 mg by mouth daily, Disp: , Rfl:     ketoconazole (NIZORAL) 2 % cream, Apply topically daily, Disp: 60 g, Rfl: 1    losartan (COZAAR) 50 mg tablet, Take 100 mg by mouth daily , Disp: , Rfl:     metFORMIN (GLUCOPHAGE) 1000 MG tablet, Take 1,000 mg by mouth 2 (two) times a day with meals, Disp: , Rfl:     metoprolol succinate (TOPROL-XL) 50 mg 24 hr tablet, Take 50 mg by mouth daily, Disp: , Rfl:     Multiple Vitamins-Minerals (MULTIVITAMIN MEN 50+ PO), Take by mouth daily, Disp: , Rfl:     Omega-3 Fatty Acids (fish oil) 1,000 mg, Take 1,000 mg by mouth 2 (two) times a day, Disp: , Rfl:     sitaGLIPtin (JANUVIA) 100 mg tablet, Take 100 mg by mouth daily, Disp: , Rfl:     Patient Active Problem List   Diagnosis    Mixed hyperlipidemia    Essential hypertension    Coronary artery disease involving native coronary artery of native heart without angina pectoris    S/P angioplasty with stent    S/P CABG x 3    S/P AAA repair    S/P prostatectomy    H/O prostate cancer    Type 2 diabetes mellitus with diabetic polyneuropathy, without long-term current use of insulin (HCC)    Varicose veins of left lower extremity    History of endovascular stent graft for abdominal aortic aneurysm (AAA)    Bruit (arterial)    Peripheral artery disease ([facility] )          Patient ID: Gwmichaelttlester Riggs is a 64 y o  male  HPI    The following portions of the patient's history were reviewed and updated as appropriate:     family history includes Alcohol abuse in his father; Diabetes in his mother  reports that he quit smoking about 33 years ago  His smoking use included cigarettes  He has never used smokeless tobacco  He reports current alcohol use of about 14 0 standard drinks of alcohol per week   He reports that he does not use drugs  Vitals:    09/03/21 0937   BP: 140/60   Pulse: 57   Resp: 17       Review of Systems      Objective:  Patient's shoes and socks removed     Foot ExamPhysical Exam

## 2021-09-20 ENCOUNTER — APPOINTMENT (OUTPATIENT)
Dept: RADIOLOGY | Facility: CLINIC | Age: 76
End: 2021-09-20
Payer: COMMERCIAL

## 2021-09-20 DIAGNOSIS — M54.16 RADICULOPATHY OF LUMBAR REGION: ICD-10-CM

## 2021-09-20 PROCEDURE — 72114 X-RAY EXAM L-S SPINE BENDING: CPT

## 2021-09-21 ENCOUNTER — HOSPITAL ENCOUNTER (OUTPATIENT)
Dept: RADIOLOGY | Facility: HOSPITAL | Age: 76
Discharge: HOME | End: 2021-09-21
Attending: SURGERY
Payer: COMMERCIAL

## 2021-09-21 DIAGNOSIS — R09.89 BRUIT (ARTERIAL): ICD-10-CM

## 2021-09-21 DIAGNOSIS — E78.2 MIXED HYPERLIPIDEMIA: ICD-10-CM

## 2021-09-21 DIAGNOSIS — Z95.828 HISTORY OF ENDOVASCULAR STENT GRAFT FOR ABDOMINAL AORTIC ANEURYSM (AAA): ICD-10-CM

## 2021-09-21 PROCEDURE — 93880 EXTRACRANIAL BILAT STUDY: CPT | Performed by: SURGERY

## 2021-09-21 PROCEDURE — 93979 VASCULAR STUDY: CPT

## 2021-09-21 PROCEDURE — 93880 EXTRACRANIAL BILAT STUDY: CPT

## 2021-09-21 PROCEDURE — 93979 VASCULAR STUDY: CPT | Performed by: SURGERY

## 2021-09-24 ENCOUNTER — OFFICE VISIT (OUTPATIENT)
Dept: VASCULAR SURGERY | Facility: CLINIC | Age: 76
End: 2021-09-24
Payer: COMMERCIAL

## 2021-09-24 VITALS
HEART RATE: 68 BPM | TEMPERATURE: 98.1 F | WEIGHT: 252 LBS | SYSTOLIC BLOOD PRESSURE: 158 MMHG | HEIGHT: 73 IN | BODY MASS INDEX: 33.4 KG/M2 | DIASTOLIC BLOOD PRESSURE: 78 MMHG

## 2021-09-24 DIAGNOSIS — I73.9 PERIPHERAL ARTERY DISEASE (HCC): ICD-10-CM

## 2021-09-24 DIAGNOSIS — Z98.890 S/P AAA REPAIR: Primary | ICD-10-CM

## 2021-09-24 DIAGNOSIS — Z95.828 HISTORY OF ENDOVASCULAR STENT GRAFT FOR ABDOMINAL AORTIC ANEURYSM (AAA): ICD-10-CM

## 2021-09-24 DIAGNOSIS — I83.92 VARICOSE VEINS OF LEFT LOWER EXTREMITY, UNSPECIFIED WHETHER COMPLICATED: ICD-10-CM

## 2021-09-24 DIAGNOSIS — Z86.79 S/P AAA REPAIR: Primary | ICD-10-CM

## 2021-09-24 PROCEDURE — 1160F RVW MEDS BY RX/DR IN RCRD: CPT | Performed by: SURGERY

## 2021-09-24 PROCEDURE — 99214 OFFICE O/P EST MOD 30 MIN: CPT | Performed by: SURGERY

## 2021-09-24 PROCEDURE — 3077F SYST BP >= 140 MM HG: CPT | Performed by: SURGERY

## 2021-09-24 PROCEDURE — 3078F DIAST BP <80 MM HG: CPT | Performed by: SURGERY

## 2021-09-24 PROCEDURE — 1036F TOBACCO NON-USER: CPT | Performed by: SURGERY

## 2021-09-24 NOTE — PROGRESS NOTES
Assessment/Plan:    History of endovascular stent graft for abdominal aortic aneurysm (AAA)  Remote history of EVAR in [facility]  Duplex shows graft to be widely patent  Repeat duplex in 2 years  Discussed natural history of aneurysmal disease and the genetic predisposition  Patient advised to have first-degree relatives of appropriate age screened for aneurysmal disease  Varicose veins of left lower extremity  Varicose veins of the bilateral lower extremity secondary to chronic venous insufficiency  The venous insufficiency is the contributing factor to his night calf cramping  Patient does wear compression stocking therapy and states his legs are much improved when wearing the stockings  Patient encouraged to continue with compression stocking therapy, leg elevation and moderate exercise  Peripheral artery disease (HCC)  Not the etiology of his calf cramping  His leg complaints are secondary to chronic venous insufficiency  Diagnoses and all orders for this visit:    S/P AAA repair  -     VAS abdominal aorta/iliacs; limited study; Future    Varicose veins of left lower extremity, unspecified whether complicated    Peripheral artery disease ([facility] )    History of endovascular stent graft for abdominal aortic aneurysm (AAA)          Subjective:      Patient ID: Edilberto Jean Baptiste is a 64 y o  male  Patient is here to rev AOIL/ CV done on 9/21/21  Duplex shows the EVAR to be widely patent  FABIOLA on the right measured at 0 73 and on the left 1 01  Carotid duplex was also performed which showed mild atherosclerotic plaque bilaterally with velocities consistent with less than 50% stenosis  Patient has h/o EVAR repair in 2012, in [facility]  Patient denies TIA or stroke like symptoms  Patient reports constant leg pain and frequent "Arthur Horses"  Leg cramping occurs at night and is accompanied by swelling consistent with chronic venous insufficiency    Patient does have large varicosities particularly at the left thigh area  Patient has skin changes at the bilateral ankles consistent with chronic venous insufficiency  Patient had undergone bilateral lower extremity arterial intervention by an interventional radiology service in [facility], which did not improve his symptoms  Patient denies abdomen pain  Patient is taking ASA 81mg, atorvastatin, and Plavix  Patient is a former smoker  The following portions of the patient's history were reviewed and updated as appropriate: allergies, current medications, past family history, past medical history, past social history, past surgical history and problem list     Review of Systems   Constitutional: Negative  HENT: Negative  Eyes: Negative  Respiratory: Negative  Cardiovascular: Negative  Gastrointestinal: Negative  Endocrine: Negative  Genitourinary: Negative  Musculoskeletal: Negative  Skin: Negative  Allergic/Immunologic: Negative  Neurological: Negative  Hematological: Negative  Psychiatric/Behavioral: Negative  Objective:      /78 (BP Location: Left arm, Patient Position: Sitting, Cuff Size: Standard)   Pulse 68   Temp 98 1 °F (36 7 °C) (Tympanic)   Ht 6' 1" (1 854 m)   Wt 114 kg (252 lb)   BMI 33 25 kg/m²          Physical Exam  Vitals and nursing note reviewed  Constitutional:       Appearance: He is well-developed  HENT:      Head: Normocephalic and atraumatic  Eyes:      Conjunctiva/sclera: Conjunctivae normal       Pupils: Pupils are equal, round, and reactive to light  Neck:      Vascular: No JVD  Pulmonary:      Effort: No respiratory distress  Breath sounds: No stridor  No wheezing or rales  Chest:      Chest wall: No tenderness  Abdominal:      General: There is no distension  Palpations: Abdomen is soft  There is no mass  Tenderness: There is no abdominal tenderness  There is no guarding or rebound     Musculoskeletal:         General: No tenderness or deformity  Normal range of motion  Cervical back: Normal range of motion and neck supple  Skin:     General: Skin is warm and dry  Findings: No erythema or rash  Neurological:      Mental Status: He is alert and oriented to person, place, and time  Psychiatric:         Behavior: Behavior normal          Thought Content:  Thought content normal

## 2021-09-24 NOTE — ASSESSMENT & PLAN NOTE
Remote history of EVAR in [facility]  Duplex shows graft to be widely patent  Repeat duplex in 2 years  Discussed natural history of aneurysmal disease and the genetic predisposition  Patient advised to have first-degree relatives of appropriate age screened for aneurysmal disease  clear to auscultation bilaterally , no wheezes, rales, rhonchi

## 2021-09-24 NOTE — ASSESSMENT & PLAN NOTE
Varicose veins of the bilateral lower extremity secondary to chronic venous insufficiency  The venous insufficiency is the contributing factor to his night calf cramping  Patient does wear compression stocking therapy and states his legs are much improved when wearing the stockings  Patient encouraged to continue with compression stocking therapy, leg elevation and moderate exercise

## 2021-09-24 NOTE — LETTER
September 24, 2021     Kirby Smoker, DO  [address] [address]    Patient: Evelin Dominguez   YOB: 1945   Date of Visit: 9/24/2021       Dear Dr Juanell Mortimer:    Thank you for referring Evelin Dominguez to me for evaluation  Below are the relevant portions of my assessment and plan of care  Patient is here to rev AOIL/ CV done on 9/21/21  Duplex shows the EVAR to be widely patent  Will repeat surveillance duplex in 2 years  FABIOLA on the right measured at 0 73 and on the left 1 01  Carotid duplex was also performed which showed mild atherosclerotic plaque bilaterally with velocities consistent with less than 50% stenosis  Patient has h/o EVAR repair in 2012, in [place]  Patient denies TIA or stroke like symptoms  Patient reports constant leg pain and frequent "Arthur Horses"  Leg cramping occurs at night and is accompanied by swelling consistent with chronic venous insufficiency  Patient does have large varicosities particularly at the left thigh area  Patient has skin changes at the bilateral ankles consistent with chronic venous insufficiency  Patient had undergone bilateral lower extremity arterial intervention by an interventional radiology service in [place], which did not improve his symptoms  Patient denies abdomen pain  Patient is taking ASA 81mg, atorvastatin, and Plavix  Patient is a former smoker  If you have questions, please do not hesitate to call me  I look forward to following Génesis Ogden along with you           Sincerely,        Gabriela Beth MD        CC: Varghese Mcdaniels MD

## 2021-09-24 NOTE — ASSESSMENT & PLAN NOTE
Not the etiology of his calf cramping  His leg complaints are secondary to chronic venous insufficiency

## 2021-09-29 DIAGNOSIS — M77.42 METATARSALGIA OF BOTH FEET: ICD-10-CM

## 2021-09-29 DIAGNOSIS — M77.41 METATARSALGIA OF BOTH FEET: ICD-10-CM

## 2021-09-29 DIAGNOSIS — M54.16 RADICULOPATHY OF LUMBAR REGION: ICD-10-CM

## 2021-09-29 DIAGNOSIS — E11.42 DIABETIC POLYNEUROPATHY ASSOCIATED WITH TYPE 2 DIABETES MELLITUS (HCC): ICD-10-CM

## 2021-09-29 RX ORDER — GABAPENTIN 100 MG/1
100 CAPSULE ORAL 3 TIMES DAILY
Qty: 90 CAPSULE | Refills: 0 | Status: SHIPPED | OUTPATIENT
Start: 2021-09-29 | End: 2021-10-01 | Stop reason: DRUGHIGH

## 2021-09-29 RX ORDER — GABAPENTIN 600 MG/1
600 TABLET ORAL 2 TIMES DAILY
Qty: 60 TABLET | Refills: 0 | Status: SHIPPED | OUTPATIENT
Start: 2021-09-29 | End: 2021-10-05 | Stop reason: SDUPTHER

## 2021-10-01 ENCOUNTER — OFFICE VISIT (OUTPATIENT)
Dept: PODIATRY | Facility: CLINIC | Age: 76
End: 2021-10-01
Payer: COMMERCIAL

## 2021-10-01 VITALS
HEIGHT: 73 IN | BODY MASS INDEX: 33.4 KG/M2 | DIASTOLIC BLOOD PRESSURE: 82 MMHG | SYSTOLIC BLOOD PRESSURE: 138 MMHG | WEIGHT: 252 LBS | RESPIRATION RATE: 17 BRPM

## 2021-10-01 DIAGNOSIS — I70.209 PERIPHERAL ARTERIOSCLEROSIS (HCC): ICD-10-CM

## 2021-10-01 DIAGNOSIS — B35.1 ONYCHOMYCOSIS: ICD-10-CM

## 2021-10-01 DIAGNOSIS — M77.41 METATARSALGIA OF BOTH FEET: ICD-10-CM

## 2021-10-01 DIAGNOSIS — M77.42 METATARSALGIA OF BOTH FEET: ICD-10-CM

## 2021-10-01 DIAGNOSIS — M54.16 RADICULOPATHY OF LUMBAR REGION: ICD-10-CM

## 2021-10-01 DIAGNOSIS — E11.42 DIABETIC POLYNEUROPATHY ASSOCIATED WITH TYPE 2 DIABETES MELLITUS (HCC): Primary | ICD-10-CM

## 2021-10-01 PROCEDURE — 99213 OFFICE O/P EST LOW 20 MIN: CPT | Performed by: PODIATRIST

## 2021-10-01 RX ORDER — GABAPENTIN 600 MG/1
600 TABLET ORAL 3 TIMES DAILY
Qty: 270 TABLET | Refills: 0 | Status: SHIPPED | OUTPATIENT
Start: 2021-10-01 | End: 2021-10-14

## 2021-10-05 DIAGNOSIS — M77.41 METATARSALGIA OF BOTH FEET: ICD-10-CM

## 2021-10-05 DIAGNOSIS — E11.42 DIABETIC POLYNEUROPATHY ASSOCIATED WITH TYPE 2 DIABETES MELLITUS (HCC): ICD-10-CM

## 2021-10-05 DIAGNOSIS — M54.16 RADICULOPATHY OF LUMBAR REGION: ICD-10-CM

## 2021-10-05 DIAGNOSIS — M77.42 METATARSALGIA OF BOTH FEET: ICD-10-CM

## 2021-10-05 RX ORDER — GABAPENTIN 600 MG/1
600 TABLET ORAL 2 TIMES DAILY
Qty: 180 TABLET | Refills: 0 | Status: SHIPPED | OUTPATIENT
Start: 2021-10-05 | End: 2021-12-28

## 2021-10-08 ENCOUNTER — APPOINTMENT (OUTPATIENT)
Dept: LAB | Facility: CLINIC | Age: 76
End: 2021-10-08
Payer: COMMERCIAL

## 2021-10-08 DIAGNOSIS — I10 ESSENTIAL HYPERTENSION: ICD-10-CM

## 2021-10-08 DIAGNOSIS — Z95.828 HISTORY OF ENDOVASCULAR STENT GRAFT FOR ABDOMINAL AORTIC ANEURYSM (AAA): ICD-10-CM

## 2021-10-08 DIAGNOSIS — I25.10 CORONARY ARTERY DISEASE INVOLVING NATIVE CORONARY ARTERY OF NATIVE HEART WITHOUT ANGINA PECTORIS: Primary | ICD-10-CM

## 2021-10-08 DIAGNOSIS — E11.42 TYPE 2 DIABETES MELLITUS WITH DIABETIC POLYNEUROPATHY, WITHOUT LONG-TERM CURRENT USE OF INSULIN (HCC): ICD-10-CM

## 2021-10-08 LAB
ALBUMIN SERPL BCP-MCNC: 3.6 G/DL (ref 3.5–5)
ALP SERPL-CCNC: 60 U/L (ref 46–116)
ALT SERPL W P-5'-P-CCNC: 36 U/L (ref 12–78)
ANION GAP SERPL CALCULATED.3IONS-SCNC: 3 MMOL/L (ref 4–13)
AST SERPL W P-5'-P-CCNC: 21 U/L (ref 5–45)
BILIRUB SERPL-MCNC: 0.56 MG/DL (ref 0.2–1)
BUN SERPL-MCNC: 33 MG/DL (ref 5–25)
CALCIUM SERPL-MCNC: 9.7 MG/DL (ref 8.3–10.1)
CHLORIDE SERPL-SCNC: 105 MMOL/L (ref 100–108)
CO2 SERPL-SCNC: 29 MMOL/L (ref 21–32)
CREAT SERPL-MCNC: 1.12 MG/DL (ref 0.6–1.3)
EST. AVERAGE GLUCOSE BLD GHB EST-MCNC: 154 MG/DL
GFR SERPL CREATININE-BSD FRML MDRD: 63 ML/MIN/1.73SQ M
GLUCOSE P FAST SERPL-MCNC: 167 MG/DL (ref 65–99)
HBA1C MFR BLD: 7 %
POTASSIUM SERPL-SCNC: 4 MMOL/L (ref 3.5–5.3)
PROT SERPL-MCNC: 7.9 G/DL (ref 6.4–8.2)
SODIUM SERPL-SCNC: 137 MMOL/L (ref 136–145)

## 2021-10-08 PROCEDURE — 80053 COMPREHEN METABOLIC PANEL: CPT

## 2021-10-08 PROCEDURE — 36415 COLL VENOUS BLD VENIPUNCTURE: CPT

## 2021-10-08 PROCEDURE — 83036 HEMOGLOBIN GLYCOSYLATED A1C: CPT

## 2021-10-08 RX ORDER — METOPROLOL SUCCINATE 50 MG/1
50 TABLET, EXTENDED RELEASE ORAL DAILY
Qty: 90 TABLET | Refills: 3 | Status: SHIPPED | OUTPATIENT
Start: 2021-10-08 | End: 2021-10-27 | Stop reason: SDUPTHER

## 2021-10-14 ENCOUNTER — OFFICE VISIT (OUTPATIENT)
Dept: FAMILY MEDICINE CLINIC | Facility: CLINIC | Age: 76
End: 2021-10-14
Payer: COMMERCIAL

## 2021-10-14 ENCOUNTER — TELEPHONE (OUTPATIENT)
Dept: FAMILY MEDICINE CLINIC | Facility: CLINIC | Age: 76
End: 2021-10-14

## 2021-10-14 VITALS
HEIGHT: 73 IN | BODY MASS INDEX: 33.93 KG/M2 | DIASTOLIC BLOOD PRESSURE: 78 MMHG | RESPIRATION RATE: 20 BRPM | HEART RATE: 60 BPM | SYSTOLIC BLOOD PRESSURE: 172 MMHG | TEMPERATURE: 97.6 F | WEIGHT: 256 LBS

## 2021-10-14 DIAGNOSIS — Z23 NEED FOR VACCINATION: ICD-10-CM

## 2021-10-14 DIAGNOSIS — E78.2 MIXED HYPERLIPIDEMIA: ICD-10-CM

## 2021-10-14 DIAGNOSIS — I83.92 VARICOSE VEINS OF LEFT LOWER EXTREMITY, UNSPECIFIED WHETHER COMPLICATED: ICD-10-CM

## 2021-10-14 DIAGNOSIS — E11.42 TYPE 2 DIABETES MELLITUS WITH DIABETIC POLYNEUROPATHY, WITHOUT LONG-TERM CURRENT USE OF INSULIN (HCC): Primary | ICD-10-CM

## 2021-10-14 DIAGNOSIS — Z11.59 NEED FOR HEPATITIS C SCREENING TEST: ICD-10-CM

## 2021-10-14 DIAGNOSIS — I10 ESSENTIAL HYPERTENSION: ICD-10-CM

## 2021-10-14 PROCEDURE — 90662 IIV NO PRSV INCREASED AG IM: CPT

## 2021-10-14 PROCEDURE — G0008 ADMIN INFLUENZA VIRUS VAC: HCPCS

## 2021-10-14 PROCEDURE — 99214 OFFICE O/P EST MOD 30 MIN: CPT | Performed by: FAMILY MEDICINE

## 2021-10-14 PROCEDURE — 1036F TOBACCO NON-USER: CPT | Performed by: FAMILY MEDICINE

## 2021-10-14 PROCEDURE — 1160F RVW MEDS BY RX/DR IN RCRD: CPT | Performed by: FAMILY MEDICINE

## 2021-10-14 RX ORDER — AMLODIPINE BESYLATE 10 MG/1
10 TABLET ORAL DAILY
Qty: 90 TABLET | Refills: 1 | Status: SHIPPED | OUTPATIENT
Start: 2021-10-14 | End: 2022-05-05 | Stop reason: SDUPTHER

## 2021-10-26 DIAGNOSIS — E11.42 TYPE 2 DIABETES MELLITUS WITH DIABETIC POLYNEUROPATHY, WITHOUT LONG-TERM CURRENT USE OF INSULIN (HCC): ICD-10-CM

## 2021-10-26 DIAGNOSIS — E11.42 TYPE 2 DIABETES MELLITUS WITH DIABETIC POLYNEUROPATHY, WITHOUT LONG-TERM CURRENT USE OF INSULIN (HCC): Primary | ICD-10-CM

## 2021-10-27 DIAGNOSIS — Z95.828 HISTORY OF ENDOVASCULAR STENT GRAFT FOR ABDOMINAL AORTIC ANEURYSM (AAA): ICD-10-CM

## 2021-10-27 DIAGNOSIS — I10 ESSENTIAL HYPERTENSION: ICD-10-CM

## 2021-10-27 DIAGNOSIS — I25.10 CORONARY ARTERY DISEASE INVOLVING NATIVE CORONARY ARTERY OF NATIVE HEART WITHOUT ANGINA PECTORIS: ICD-10-CM

## 2021-10-27 RX ORDER — METOPROLOL SUCCINATE 50 MG/1
50 TABLET, EXTENDED RELEASE ORAL DAILY
Qty: 90 TABLET | Refills: 3 | Status: SHIPPED | OUTPATIENT
Start: 2021-10-27

## 2021-10-27 RX ORDER — LOSARTAN POTASSIUM 50 MG/1
100 TABLET ORAL DAILY
Qty: 90 TABLET | Refills: 3 | Status: SHIPPED | OUTPATIENT
Start: 2021-10-27 | End: 2022-01-13

## 2021-10-28 ENCOUNTER — TELEPHONE (OUTPATIENT)
Dept: FAMILY MEDICINE CLINIC | Facility: CLINIC | Age: 76
End: 2021-10-28

## 2021-10-28 DIAGNOSIS — E11.42 TYPE 2 DIABETES MELLITUS WITH DIABETIC POLYNEUROPATHY, WITHOUT LONG-TERM CURRENT USE OF INSULIN (HCC): ICD-10-CM

## 2021-11-01 ENCOUNTER — TELEPHONE (OUTPATIENT)
Dept: FAMILY MEDICINE CLINIC | Facility: CLINIC | Age: 76
End: 2021-11-01

## 2021-11-01 ENCOUNTER — OFFICE VISIT (OUTPATIENT)
Dept: FAMILY MEDICINE CLINIC | Facility: CLINIC | Age: 76
End: 2021-11-01
Payer: COMMERCIAL

## 2021-11-01 ENCOUNTER — HOSPITAL ENCOUNTER (OUTPATIENT)
Dept: RADIOLOGY | Facility: HOSPITAL | Age: 76
Discharge: HOME | End: 2021-11-01
Attending: FAMILY MEDICINE
Payer: COMMERCIAL

## 2021-11-01 VITALS
HEART RATE: 58 BPM | BODY MASS INDEX: 34.06 KG/M2 | OXYGEN SATURATION: 96 % | TEMPERATURE: 97.5 F | HEIGHT: 73 IN | WEIGHT: 257 LBS | SYSTOLIC BLOOD PRESSURE: 140 MMHG | RESPIRATION RATE: 16 BRPM | DIASTOLIC BLOOD PRESSURE: 70 MMHG

## 2021-11-01 DIAGNOSIS — M79.89 RIGHT LEG SWELLING: ICD-10-CM

## 2021-11-01 DIAGNOSIS — M79.604 PAIN OF RIGHT LOWER EXTREMITY: Primary | ICD-10-CM

## 2021-11-01 DIAGNOSIS — E11.42 TYPE 2 DIABETES MELLITUS WITH DIABETIC POLYNEUROPATHY, WITHOUT LONG-TERM CURRENT USE OF INSULIN (HCC): ICD-10-CM

## 2021-11-01 DIAGNOSIS — M79.604 PAIN OF RIGHT LOWER EXTREMITY: ICD-10-CM

## 2021-11-01 PROCEDURE — 93971 EXTREMITY STUDY: CPT

## 2021-11-01 PROCEDURE — 3077F SYST BP >= 140 MM HG: CPT | Performed by: FAMILY MEDICINE

## 2021-11-01 PROCEDURE — 1160F RVW MEDS BY RX/DR IN RCRD: CPT | Performed by: FAMILY MEDICINE

## 2021-11-01 PROCEDURE — 3078F DIAST BP <80 MM HG: CPT | Performed by: FAMILY MEDICINE

## 2021-11-01 PROCEDURE — 93971 EXTREMITY STUDY: CPT | Performed by: SURGERY

## 2021-11-01 PROCEDURE — 1036F TOBACCO NON-USER: CPT | Performed by: FAMILY MEDICINE

## 2021-11-01 PROCEDURE — 99214 OFFICE O/P EST MOD 30 MIN: CPT | Performed by: FAMILY MEDICINE

## 2021-11-01 RX ORDER — CEPHALEXIN 500 MG/1
500 CAPSULE ORAL EVERY 12 HOURS SCHEDULED
Qty: 20 CAPSULE | Refills: 0 | Status: SHIPPED | OUTPATIENT
Start: 2021-11-01 | End: 2021-11-11

## 2021-11-02 DIAGNOSIS — E11.42 TYPE 2 DIABETES MELLITUS WITH DIABETIC POLYNEUROPATHY, WITHOUT LONG-TERM CURRENT USE OF INSULIN (HCC): ICD-10-CM

## 2021-12-10 ENCOUNTER — OFFICE VISIT (OUTPATIENT)
Dept: URGENT CARE | Facility: CLINIC | Age: 76
End: 2021-12-10
Payer: COMMERCIAL

## 2021-12-10 VITALS
DIASTOLIC BLOOD PRESSURE: 82 MMHG | OXYGEN SATURATION: 98 % | HEIGHT: 74 IN | BODY MASS INDEX: 33.24 KG/M2 | TEMPERATURE: 96.3 F | RESPIRATION RATE: 20 BRPM | HEART RATE: 52 BPM | SYSTOLIC BLOOD PRESSURE: 172 MMHG | WEIGHT: 259 LBS

## 2021-12-10 DIAGNOSIS — L03.012 PARONYCHIA OF FINGER OF LEFT HAND: Primary | ICD-10-CM

## 2021-12-10 PROCEDURE — 99213 OFFICE O/P EST LOW 20 MIN: CPT | Performed by: PHYSICIAN ASSISTANT

## 2021-12-10 RX ORDER — CEPHALEXIN 500 MG/1
500 CAPSULE ORAL EVERY 8 HOURS SCHEDULED
Qty: 15 CAPSULE | Refills: 0 | Status: SHIPPED | OUTPATIENT
Start: 2021-12-10 | End: 2021-12-15

## 2021-12-28 ENCOUNTER — OFFICE VISIT (OUTPATIENT)
Dept: PODIATRY | Facility: CLINIC | Age: 76
End: 2021-12-28
Payer: COMMERCIAL

## 2021-12-28 VITALS — HEIGHT: 74 IN | BODY MASS INDEX: 33.24 KG/M2 | RESPIRATION RATE: 17 BRPM | WEIGHT: 259 LBS

## 2021-12-28 DIAGNOSIS — M77.41 METATARSALGIA OF BOTH FEET: ICD-10-CM

## 2021-12-28 DIAGNOSIS — M79.671 PAIN IN BOTH FEET: ICD-10-CM

## 2021-12-28 DIAGNOSIS — B35.1 ONYCHOMYCOSIS: ICD-10-CM

## 2021-12-28 DIAGNOSIS — M79.672 PAIN IN BOTH FEET: ICD-10-CM

## 2021-12-28 DIAGNOSIS — B35.3 TINEA PEDIS OF BOTH FEET: ICD-10-CM

## 2021-12-28 DIAGNOSIS — I70.209 PERIPHERAL ARTERIOSCLEROSIS (HCC): ICD-10-CM

## 2021-12-28 DIAGNOSIS — M54.16 RADICULOPATHY OF LUMBAR REGION: Primary | ICD-10-CM

## 2021-12-28 DIAGNOSIS — E11.42 DIABETIC POLYNEUROPATHY ASSOCIATED WITH TYPE 2 DIABETES MELLITUS (HCC): ICD-10-CM

## 2021-12-28 DIAGNOSIS — B35.9 DERMATOPHYTOSIS: ICD-10-CM

## 2021-12-28 DIAGNOSIS — M77.42 METATARSALGIA OF BOTH FEET: ICD-10-CM

## 2021-12-28 PROCEDURE — 99212 OFFICE O/P EST SF 10 MIN: CPT | Performed by: PODIATRIST

## 2021-12-28 PROCEDURE — 11721 DEBRIDE NAIL 6 OR MORE: CPT | Performed by: PODIATRIST

## 2021-12-28 RX ORDER — GABAPENTIN 600 MG/1
600 TABLET ORAL 2 TIMES DAILY
Qty: 180 TABLET | Refills: 0 | Status: SHIPPED | OUTPATIENT
Start: 2021-12-28 | End: 2022-03-21

## 2022-01-10 ENCOUNTER — RA CDI HCC (OUTPATIENT)
Dept: OTHER | Facility: HOSPITAL | Age: 77
End: 2022-01-10

## 2022-01-10 NOTE — PROGRESS NOTES
[address]  coding opportunities          Number of diagnosis code(s) already on the problem list added to FYI fla     Chart Reviewed * (Number of) Inbasket suggestions sent to Provider: 1     Problem listed updated  Provider Accepted, (number of) suggestions accepted: 1         Number of suggestions used: 3      Number of suggestions NOT actually used: 0     Patients insurance company: [employer] (Medicare Advantage and Commercial)     Visit status: Patient arrived for their scheduled appointment        [address]  coding opportunities          Number of diagnosis code(s) already on the problem list added to FYI fla     Chart Reviewed * (Number of) Inbasket suggestions sent to Provider: 1     Problem listed updated  Provider Accepted, (number of) suggestions accepted: 1               Patients insurance company: [address]  (Medicare Advantage and Commercial)           [address]  coding opportunities          [address]  coding opportunities          Number of diagnosis code(s) already on the problem list added to [employer] fla     Chart Reviewed * (Number of) Inbasket suggestions sent to Provider: 1     Based on clinical documentation indicated in your record, it appears that the patient may have the following conditions;    E11 51 T2DM with diabetic peripheral angiopathy without gangrene(HCC)    * Per coding guidelines; When PAD or atherosclerosis of the native arteries is documented with diabetes, report the above mentioned combination code      Please review/recertify the BPA diagnoses for      If this is correct, please document and assess at your next visit 22     [address] 42, [address] 9             Patients insurance company: [address]  (Medicare Advantage and Commercial)                           Patients insurance company: [address]  (Medicare Advantage and Commercial)

## 2022-01-11 ENCOUNTER — APPOINTMENT (OUTPATIENT)
Dept: LAB | Facility: CLINIC | Age: 77
End: 2022-01-11
Payer: COMMERCIAL

## 2022-01-11 DIAGNOSIS — E78.2 MIXED HYPERLIPIDEMIA: ICD-10-CM

## 2022-01-11 DIAGNOSIS — E11.42 TYPE 2 DIABETES MELLITUS WITH DIABETIC POLYNEUROPATHY, WITHOUT LONG-TERM CURRENT USE OF INSULIN (HCC): ICD-10-CM

## 2022-01-11 DIAGNOSIS — I10 ESSENTIAL HYPERTENSION: ICD-10-CM

## 2022-01-11 DIAGNOSIS — Z11.59 NEED FOR HEPATITIS C SCREENING TEST: ICD-10-CM

## 2022-01-11 PROBLEM — E11.51 TYPE 2 DIABETES MELLITUS WITH DIABETIC PERIPHERAL ANGIOPATHY WITHOUT GANGRENE (HCC): Status: ACTIVE | Noted: 2022-01-11

## 2022-01-11 LAB
ALBUMIN SERPL BCP-MCNC: 3.8 G/DL (ref 3.5–5)
ALP SERPL-CCNC: 63 U/L (ref 46–116)
ALT SERPL W P-5'-P-CCNC: 37 U/L (ref 12–78)
ANION GAP SERPL CALCULATED.3IONS-SCNC: 4 MMOL/L (ref 4–13)
AST SERPL W P-5'-P-CCNC: 21 U/L (ref 5–45)
BILIRUB SERPL-MCNC: 0.96 MG/DL (ref 0.2–1)
BUN SERPL-MCNC: 25 MG/DL (ref 5–25)
CALCIUM SERPL-MCNC: 9.6 MG/DL (ref 8.3–10.1)
CHLORIDE SERPL-SCNC: 104 MMOL/L (ref 100–108)
CHOLEST SERPL-MCNC: 131 MG/DL
CO2 SERPL-SCNC: 30 MMOL/L (ref 21–32)
CREAT SERPL-MCNC: 1.13 MG/DL (ref 0.6–1.3)
CREAT UR-MCNC: 126 MG/DL
EST. AVERAGE GLUCOSE BLD GHB EST-MCNC: 209 MG/DL
GFR SERPL CREATININE-BSD FRML MDRD: 61 ML/MIN/1.73SQ M
GLUCOSE P FAST SERPL-MCNC: 202 MG/DL (ref 65–99)
HBA1C MFR BLD: 8.9 %
HCV AB SER QL: NORMAL
HDLC SERPL-MCNC: 44 MG/DL
LDLC SERPL CALC-MCNC: 49 MG/DL (ref 0–100)
MICROALBUMIN UR-MCNC: 72.2 MG/L (ref 0–20)
MICROALBUMIN/CREAT 24H UR: 57 MG/G CREATININE (ref 0–30)
POTASSIUM SERPL-SCNC: 4 MMOL/L (ref 3.5–5.3)
PROT SERPL-MCNC: 7.6 G/DL (ref 6.4–8.2)
SODIUM SERPL-SCNC: 138 MMOL/L (ref 136–145)
TRIGL SERPL-MCNC: 189 MG/DL

## 2022-01-11 PROCEDURE — 80061 LIPID PANEL: CPT

## 2022-01-11 PROCEDURE — 86803 HEPATITIS C AB TEST: CPT

## 2022-01-11 PROCEDURE — 82043 UR ALBUMIN QUANTITATIVE: CPT

## 2022-01-11 PROCEDURE — 36415 COLL VENOUS BLD VENIPUNCTURE: CPT

## 2022-01-11 PROCEDURE — 80053 COMPREHEN METABOLIC PANEL: CPT

## 2022-01-11 PROCEDURE — 82570 ASSAY OF URINE CREATININE: CPT

## 2022-01-11 PROCEDURE — 83036 HEMOGLOBIN GLYCOSYLATED A1C: CPT

## 2022-01-13 ENCOUNTER — OFFICE VISIT (OUTPATIENT)
Dept: CARDIOLOGY CLINIC | Facility: CLINIC | Age: 77
End: 2022-01-13
Payer: COMMERCIAL

## 2022-01-13 VITALS
HEART RATE: 58 BPM | OXYGEN SATURATION: 98 % | WEIGHT: 258 LBS | DIASTOLIC BLOOD PRESSURE: 82 MMHG | HEIGHT: 74 IN | TEMPERATURE: 98.2 F | BODY MASS INDEX: 33.11 KG/M2 | SYSTOLIC BLOOD PRESSURE: 150 MMHG

## 2022-01-13 DIAGNOSIS — I25.10 CORONARY ARTERY DISEASE INVOLVING NATIVE CORONARY ARTERY OF NATIVE HEART WITHOUT ANGINA PECTORIS: Primary | ICD-10-CM

## 2022-01-13 DIAGNOSIS — E78.2 MIXED HYPERLIPIDEMIA: ICD-10-CM

## 2022-01-13 DIAGNOSIS — E11.42 DIABETIC POLYNEUROPATHY ASSOCIATED WITH TYPE 2 DIABETES MELLITUS (HCC): ICD-10-CM

## 2022-01-13 DIAGNOSIS — Z95.828 HISTORY OF ENDOVASCULAR STENT GRAFT FOR ABDOMINAL AORTIC ANEURYSM (AAA): ICD-10-CM

## 2022-01-13 DIAGNOSIS — Z95.1 S/P CABG X 3: ICD-10-CM

## 2022-01-13 DIAGNOSIS — I70.209 PERIPHERAL ARTERIOSCLEROSIS (HCC): ICD-10-CM

## 2022-01-13 DIAGNOSIS — I10 ESSENTIAL HYPERTENSION: ICD-10-CM

## 2022-01-13 PROCEDURE — 3079F DIAST BP 80-89 MM HG: CPT | Performed by: INTERNAL MEDICINE

## 2022-01-13 PROCEDURE — 99214 OFFICE O/P EST MOD 30 MIN: CPT | Performed by: INTERNAL MEDICINE

## 2022-01-13 PROCEDURE — 3077F SYST BP >= 140 MM HG: CPT | Performed by: INTERNAL MEDICINE

## 2022-01-13 PROCEDURE — 1160F RVW MEDS BY RX/DR IN RCRD: CPT | Performed by: INTERNAL MEDICINE

## 2022-01-13 PROCEDURE — 93000 ELECTROCARDIOGRAM COMPLETE: CPT | Performed by: INTERNAL MEDICINE

## 2022-01-13 RX ORDER — LOSARTAN POTASSIUM 100 MG/1
100 TABLET ORAL DAILY
Qty: 90 TABLET | Refills: 2 | Status: SHIPPED | OUTPATIENT
Start: 2022-01-13

## 2022-01-13 NOTE — PROGRESS NOTES
Progress Note - Cardiology Office  [facility]    Slava Ceja 64 y o  male MRN: [id number]  : 1945  Encounter: [id number]         ASSESSMENT:  CAD,   s/p CABG X 4 NU 4405 at [facility],   s/p PCI in  at [facility]  On aspirin 81 mg, Plavix 75 mg, statin and beta-blocker  Denies chest pain or dyspnea     S/P Abdominal aortic aneurysm repair w/ Stent      Hypertension  BP today is 152/80 mmHg  On amlodipine 5 mg, hydrochlorothiazide 25 mg, losartan 50 mg, Toprol 50 mg,  > increase losartan to 100 mg daily     Mixed hyperlipidemia  On fish oil 2 g daily, atorvastatin 40 mg  2021:  LDL 61, HDL 50, triglycerides 167, normal liver enzymes  Fish oil increased to 4 g daily  2022:  LDL 49, HDL 44, triglycerides 189,  Hemoglobin A1c is 8 9, previously 6 7--> 7 0   normal liver enzymes on 10/14/2021    Patient has not been watching his diet and has been having too many sweets over the holidays which has led to increase in his weight, hemoglobin A1c and triglycerides     Diabetes mellitus type 2     Obesity:  BMI is 33 13     S/p prostatectomy for CA prostate     Open wound  Managed by PCP        RECOMMENDATIONS:  Increase losartan to 100 mg daily  Advised on low-cholesterol and low-salt diet and regular cardiovascular exercise  Repeat lipid profile and LFTs in 3 months and decide on medications  Strongly advised patient on controlling his blood sugar and losing weight which she has gained over the holidays      Please call [phone] if any questions  HPI :     Slava Ceja is a 64y o  year old male who came for follow up  As per patient he has not been watching his diet over the holidays and has gained weight and his hemoglobin A1c and triglycerides are also elevated along with his blood pressure    He denies any cardiac symptoms    REVIEW OF SYSTEMS:  Denies any new or acute cardiac symptoms like chest pain, unusual dyspnea, or syncope    Historical Information   Past Medical History:   Diagnosis Date    CAD (coronary artery disease)     Cancer ([facility] )     prostate    CHF (congestive heart failure) (HCC)     Diabetes mellitus ([facility] )     Myocardial infarction ([place] [address] )      Past Surgical History:   Procedure Laterality Date    ADENOIDECTOMY      CARDIAC SURGERY  2002    3 cardiac bypass then angioplasty 2020    CHOLECYSTECTOMY      CORONARY ARTERY BYPASS GRAFT      PROSTATE SURGERY      TONSILLECTOMY       Social History     Substance and Sexual Activity   Alcohol Use Yes    Alcohol/week: 14 0 standard drinks    Types: 14 Cans of beer per week     Social History     Substance and Sexual Activity   Drug Use Never     Social History     Tobacco Use   Smoking Status Former Smoker    Types: Cigarettes    Quit date: 80    Years since quittin 0   Smokeless Tobacco Never Used     Family History:   Family History   Problem Relation Age of Onset    Diabetes Mother     Alcohol abuse Father     Mental illness Neg Hx        Meds/Allergies     Allergies   Allergen Reactions    Lisinopril Rash and Lip Swelling       Current Outpatient Medications:     amLODIPine (NORVASC) 10 mg tablet, Take 1 tablet (10 mg total) by mouth daily, Disp: 90 tablet, Rfl: 1    aspirin 81 mg chewable tablet, Chew 81 mg daily, Disp: , Rfl:     atorvastatin (LIPITOR) 40 mg tablet, Take 40 mg by mouth daily, Disp: , Rfl:     canagliflozin (Invokana) 100 mg, Take 100 mg by mouth daily before breakfast , Disp: , Rfl:     clopidogrel (PLAVIX) 75 mg tablet, Take 75 mg by mouth daily, Disp: , Rfl:     gabapentin (NEURONTIN) 600 MG tablet, Take 1 tablet (600 mg total) by mouth 2 (two) times a day, Disp: 180 tablet, Rfl: 0    glimepiride (AMARYL) 2 mg tablet, Take 2 mg by mouth every morning before breakfast, Disp: , Rfl:     hydrochlorothiazide (HYDRODIURIL) 25 mg tablet, Take 25 mg by mouth daily, Disp: , Rfl:     losartan (COZAAR) 50 mg tablet, Take 2 tablets (100 mg total) by mouth daily, Disp: 90 tablet, Rfl: 3    metFORMIN (GLUCOPHAGE) 1000 MG tablet, Take 1 tablet (1,000 mg total) by mouth 2 (two) times a day with meals, Disp: 180 tablet, Rfl: 0    metoprolol succinate (TOPROL-XL) 50 mg 24 hr tablet, Take 1 tablet (50 mg total) by mouth daily, Disp: 90 tablet, Rfl: 3    Multiple Vitamins-Minerals (MULTIVITAMIN MEN 50+ PO), Take by mouth daily, Disp: , Rfl:     Omega-3 Fatty Acids (fish oil) 1,000 mg, Take 4,000 mg by mouth daily , Disp: , Rfl:     sitaGLIPtin (JANUVIA) 100 mg tablet, Take 100 mg by mouth daily, Disp: , Rfl:     Vitals:   /82 mmHg  HR 58/Min  BP Readings from Last 3 Encounters:   12/10/21 (!) 172/82   11/01/21 140/70   10/14/21 (!) 172/78       Physical Exam:  Physical Exam    Neurologic:  Alert & oriented x 3, no new focal deficits, Not in any acute distress,  Constitutional:  Well developed, well nourished, non-toxic appearance   Eyes:  Pupil equal and reacting to light, conjunctiva normal,   HENT:  Atraumatic, oropharynx moist, Neck- normal range of motion, no tenderness,  Neck supple, No JVP, No LNP   Respiratory:  Bilateral air entry, mostly clear to auscultation  Cardiovascular: S1-S2 regular with a I/VI ejection systolic murmur   GI:  Soft, nondistended, normal bowel sounds, nontender, no hepatosplenomegaly appreciated  Musculoskeletal:  No edema, no tenderness, no deformities  Skin:  Well hydrated, no rash   Lymphatic:  No lymphadenopathy noted   Extremities:  No edema and distal pulses are present        Diagnostic Studies Review Cardio:      EKG:  Sinus bradycardia with first-degree AV block and heart rate of 58 per minute  Left axis deviation  LVH with QRS widening    Cardiac testing:   No results found for this or any previous visit  Imaging:  Chest X-Ray:   No Chest XR results available for this patient  CT-scan of the chest:     No CTA results available for this patient    Lab Review   No results found for: WBC, HGB, HCT, MCV, RDW, PLT  BMP:  Lab Results   Component Value Date    SODIUM 138 01/11/2022    K 4 0 01/11/2022     01/11/2022    CO2 30 01/11/2022    BUN 25 01/11/2022    CREATININE 1 13 01/11/2022    GLUF 202 (H) 01/11/2022    CALCIUM 9 6 01/11/2022    EGFR 61 01/11/2022     LFT:  Lab Results   Component Value Date    AST 21 01/11/2022    ALT 37 01/11/2022    ALKPHOS 63 01/11/2022    TP 7 6 01/11/2022    ALB 3 8 01/11/2022      No components found for: TSH3  No results found for: [facility]  Lab Results   Component Value Date    HGBA1C 8 9 (H) 01/11/2022     Lipid Profile:   Lab Results   Component Value Date    CHOLESTEROL 131 01/11/2022    HDL 44 01/11/2022    LDLCALC 49 01/11/2022    TRIG 189 (H) 01/11/2022     Lab Results   Component Value Date    CHOLESTEROL 131 01/11/2022    CHOLESTEROL 144 06/02/2021     No results found for: CKTOTAL, CKMB, CKMBINDEX, TROPONINI  No results found for: NTBNP   Recent Results (from the past 672 hour(s))   Hepatitis C antibody    Collection Time: 01/11/22  7:26 AM   Result Value Ref Range    Hepatitis C Ab Non-reactive Non-reactive   Comprehensive metabolic panel    Collection Time: 01/11/22  7:26 AM   Result Value Ref Range    Sodium 138 136 - 145 mmol/L    Potassium 4 0 3 5 - 5 3 mmol/L    Chloride 104 100 - 108 mmol/L    CO2 30 21 - 32 mmol/L    ANION GAP 4 4 - 13 mmol/L    BUN 25 5 - 25 mg/dL    Creatinine 1 13 0 60 - 1 30 mg/dL    Glucose, Fasting 202 (H) 65 - 99 mg/dL    Calcium 9 6 8 3 - 10 1 mg/dL    AST 21 5 - 45 U/L    ALT 37 12 - 78 U/L    Alkaline Phosphatase 63 46 - 116 U/L    Total Protein 7 6 6 4 - 8 2 g/dL    Albumin 3 8 3 5 - 5 0 g/dL    Total Bilirubin 0 96 0 20 - 1 00 mg/dL    eGFR 61 ml/min/1 73sq m   Microalbumin / creatinine urine ratio    Collection Time: 01/11/22  7:26 AM   Result Value Ref Range    Creatinine, Ur 126 0 mg/dL    Microalbum  ,U,Random 72 2 (H) 0 0 - 20 0 mg/L    Microalb Creat Ratio 57 (H) 0 - 30 mg/g creatinine   Lipid Panel with Direct LDL reflex    Collection Time: 01/11/22  7:26 AM   Result Value Ref Range    Cholesterol 131 See Comment mg/dL    Triglycerides 189 (H) See Comment mg/dL    HDL, Direct 44 >=40 mg/dL    LDL Calculated 49 0 - 100 mg/dL   Hemoglobin A1C    Collection Time: 01/11/22  7:26 AM   Result Value Ref Range    Hemoglobin A1C 8 9 (H) Normal 3 8-5 6%; PreDiabetic 5 7-6 4%; Diabetic >=6 5%; Glycemic control for adults with diabetes <7 0% %     mg/dl             Dr Estela Rain MD, [facility]      "This note has been constructed using a voice recognition system  Therefore there may be syntax, spelling, and/or grammatical errors   Please call if you have any questions  "

## 2022-01-14 ENCOUNTER — OFFICE VISIT (OUTPATIENT)
Dept: FAMILY MEDICINE CLINIC | Facility: CLINIC | Age: 77
End: 2022-01-14
Payer: COMMERCIAL

## 2022-01-14 VITALS
SYSTOLIC BLOOD PRESSURE: 130 MMHG | WEIGHT: 255 LBS | HEIGHT: 74 IN | TEMPERATURE: 98 F | RESPIRATION RATE: 18 BRPM | BODY MASS INDEX: 32.73 KG/M2 | DIASTOLIC BLOOD PRESSURE: 70 MMHG | HEART RATE: 64 BPM

## 2022-01-14 DIAGNOSIS — E78.2 MIXED HYPERLIPIDEMIA: ICD-10-CM

## 2022-01-14 DIAGNOSIS — Z90.79 S/P PROSTATECTOMY: ICD-10-CM

## 2022-01-14 DIAGNOSIS — I10 ESSENTIAL HYPERTENSION: ICD-10-CM

## 2022-01-14 DIAGNOSIS — I73.9 PERIPHERAL ARTERY DISEASE (HCC): ICD-10-CM

## 2022-01-14 DIAGNOSIS — E11.42 TYPE 2 DIABETES MELLITUS WITH DIABETIC POLYNEUROPATHY, WITHOUT LONG-TERM CURRENT USE OF INSULIN (HCC): Primary | ICD-10-CM

## 2022-01-14 DIAGNOSIS — E11.51 TYPE 2 DIABETES MELLITUS WITH DIABETIC PERIPHERAL ANGIOPATHY WITHOUT GANGRENE, WITHOUT LONG-TERM CURRENT USE OF INSULIN (HCC): ICD-10-CM

## 2022-01-14 DIAGNOSIS — Z86.79 S/P AAA REPAIR: ICD-10-CM

## 2022-01-14 DIAGNOSIS — Z95.1 S/P CABG X 3: ICD-10-CM

## 2022-01-14 DIAGNOSIS — Z95.828 HISTORY OF ENDOVASCULAR STENT GRAFT FOR ABDOMINAL AORTIC ANEURYSM (AAA): ICD-10-CM

## 2022-01-14 DIAGNOSIS — Z95.820 S/P ANGIOPLASTY WITH STENT: ICD-10-CM

## 2022-01-14 DIAGNOSIS — Z98.890 S/P AAA REPAIR: ICD-10-CM

## 2022-01-14 DIAGNOSIS — L57.0 ACTINIC KERATOSES: ICD-10-CM

## 2022-01-14 DIAGNOSIS — Z85.46 H/O PROSTATE CANCER: ICD-10-CM

## 2022-01-14 DIAGNOSIS — E66.9 OBESITY (BMI 30.0-34.9): ICD-10-CM

## 2022-01-14 DIAGNOSIS — I25.10 CORONARY ARTERY DISEASE INVOLVING NATIVE CORONARY ARTERY OF NATIVE HEART WITHOUT ANGINA PECTORIS: ICD-10-CM

## 2022-01-14 PROCEDURE — 99214 OFFICE O/P EST MOD 30 MIN: CPT | Performed by: FAMILY MEDICINE

## 2022-01-14 PROCEDURE — 1036F TOBACCO NON-USER: CPT | Performed by: FAMILY MEDICINE

## 2022-01-14 PROCEDURE — 1160F RVW MEDS BY RX/DR IN RCRD: CPT | Performed by: FAMILY MEDICINE

## 2022-01-14 PROCEDURE — 3725F SCREEN DEPRESSION PERFORMED: CPT | Performed by: FAMILY MEDICINE

## 2022-01-14 PROCEDURE — 1101F PT FALLS ASSESS-DOCD LE1/YR: CPT | Performed by: FAMILY MEDICINE

## 2022-01-14 PROCEDURE — 3288F FALL RISK ASSESSMENT DOCD: CPT | Performed by: FAMILY MEDICINE

## 2022-01-14 RX ORDER — EMPAGLIFLOZIN 25 MG/1
25 TABLET, FILM COATED ORAL EVERY MORNING
Qty: 90 TABLET | Refills: 1 | Status: SHIPPED | OUTPATIENT
Start: 2022-01-14 | End: 2022-05-05 | Stop reason: SDUPTHER

## 2022-01-14 RX ORDER — GLIMEPIRIDE 2 MG/1
2 TABLET ORAL 2 TIMES DAILY
Start: 2022-01-14 | End: 2022-05-11 | Stop reason: SDUPTHER

## 2022-01-14 NOTE — PATIENT INSTRUCTIONS
Obesity   AMBULATORY CARE:   Obesity  is when your body mass index (BMI) is greater than 30  Your healthcare provider will use your height and weight to measure your BMI  The risks of obesity include  many health problems, including injuries or physical disability  You may need tests to check for the following:  · Diabetes    · High blood pressure or high cholesterol    · Heart disease    · Stroke    · Gallbladder or liver disease    · Cancer of the colon, breast, prostate, liver, or kidney    · Sleep apnea    · Arthritis or gout    Seek care immediately if:   · You have a severe headache, confusion, or difficulty speaking  · You have weakness on one side of your body  · You have chest pain, sweating, or shortness of breath  Call your doctor if:   · You have symptoms of gallbladder or liver disease, such as pain in your upper abdomen  · You have knee or hip pain and discomfort while walking  · You have symptoms of diabetes, such as intense hunger and thirst, and frequent urination  · You have symptoms of sleep apnea, such as snoring or daytime sleepiness  · You have questions or concerns about your condition or care  Treatment for obesity  focuses on helping you lose weight to improve your health  Even a small decrease in BMI can reduce the risk for many health problems  Your healthcare provider will help you set a weight-loss goal   · Lifestyle changes  are the first step in treating obesity  These include making healthy food choices and getting regular physical activity  Your healthcare provider may suggest a weight-loss program that involves coaching, education, and therapy  · Medicine  may help you lose weight when it is used with a healthy foods and physical activity  · Surgery  can help you lose weight if you are very obese and have other health problems  There are several types of weight-loss surgery  Ask your healthcare provider for more information      Be successful losing weight:   · Set small, realistic goals  An example of a small goal is to walk for 20 minutes 5 days a week  Anther goal is to lose 5% of your body weight  · Tell friends, family members, and coworkers about your goals  and ask for their support  Ask a friend to lose weight with you, or join a weight-loss support group  · Identify foods or triggers that may cause you to overeat , and find ways to avoid them  Remove tempting high-calorie foods from your home and workplace  Place a bowl of fresh fruit on your kitchen counter  If stress causes you to eat, then find other ways to cope with stress  A counselor or therapist may be able to help you  · Keep a diary to track what you eat and drink  Also write down how many minutes of physical activity you do each day  Weigh yourself once a week and record it in your diary  Eating changes: You will need to eat 500 to 1,000 fewer calories each day than you currently eat to lose 1 to 2 pounds a week  The following changes will help you cut calories:  · Eat smaller portions  Use small plates, no larger than 9 inches in diameter  Fill your plate half full of fruits and vegetables  Measure your food using measuring cups until you know what a serving size looks like  · Eat 3 meals and 1 or 2 snacks each day  Plan your meals in advance  Shonna Butterfield and eat at home most of the time  Eat slowly  Do not skip meals  Skipping meals can lead to overeating later in the day  This can make it harder for you to lose weight  Talk with a dietitian to help you make a meal plan and schedule that is right for you  · Eat fruits and vegetables at every meal   They are low in calories and high in fiber, which makes you feel full  Do not add butter, margarine, or cream sauce to vegetables  Use herbs to season steamed vegetables  · Eat less fat and fewer fried foods  Eat more baked or grilled chicken and fish  These protein sources are lower in calories and fat than red meat  Limit fast food  Dress your salads with olive oil and vinegar instead of bottled dressing  · Limit the amount of sugar you eat  Do not drink sugary beverages  Limit alcohol  Activity changes:  Physical activity is good for your body in many ways  It helps you burn calories and build strong muscles  It decreases stress and depression, and improves your mood  It can also help you sleep better  Talk to your healthcare provider before you begin an exercise program   · Exercise for at least 30 minutes 5 days a week  Start slowly  Set aside time each day for physical activity that you enjoy and that is convenient for you  It is best to do both weight training and an activity that increases your heart rate, such as walking, bicycling, or swimming  · Find ways to be more active  Do yard work and housecleaning  Walk up the stairs instead of using elevators  Spend your leisure time going to events that require walking, such as outdoor festivals or fairs  This extra physical activity can help you lose weight and keep it off  Follow up with your doctor as directed: You may need to meet with a dietitian  Write down your questions so you remember to ask them during your visits  © Copyright [employer] 2021 Information is for End User's use only and may not be sold, redistributed or otherwise used for commercial purposes  All illustrations and images included in CareNotes® are the copyrighted property of [employer]  or Leticia Waller  The above information is an  only  It is not intended as medical advice for individual conditions or treatments  Talk to your doctor, nurse or pharmacist before following any medical regimen to see if it is safe and effective for you

## 2022-01-14 NOTE — PROGRESS NOTES
Assessment/Plan:    1  Type 2 diabetes mellitus with diabetic polyneuropathy, without long-term current use of insulin ([facility])  -     glimepiride (AMARYL) 2 mg tablet; Take 1 tablet (2 mg total) by mouth 2 (two) times a day  -     Empagliflozin (Jardiance) 25 MG TABS; Take 1 tablet (25 mg total) by mouth every morning  -     Hemoglobin A1C; Future; Expected date: 04/14/2022  -     Comprehensive metabolic panel; Future; Expected date: 04/14/2022    2  Obesity (BMI 30 0-34 9)    3  BMI 32 0-32 9,adult    4  Type 2 diabetes mellitus with diabetic peripheral angiopathy without gangrene, without long-term current use of insulin ([facility] )    5  Coronary artery disease involving native coronary artery of native heart without angina pectoris    6  Essential hypertension  Assessment & Plan:  bp is stable      7  Mixed hyperlipidemia    8  S/P prostatectomy    9  S/P CABG x 3    10  S/P angioplasty with stent    11  S/P AAA repair    12  H/O prostate cancer    13  History of endovascular stent graft for abdominal aortic aneurysm (AAA)    14  Peripheral artery disease ([facility] )    15  Actinic keratoses  -     Ambulatory referral to Dermatology; Future          Patient Instructions       Obesity   AMBULATORY CARE:   Obesity  is when your body mass index (BMI) is greater than 30  Your healthcare provider will use your height and weight to measure your BMI  The risks of obesity include  many health problems, including injuries or physical disability  You may need tests to check for the following:  · Diabetes    · High blood pressure or high cholesterol    · Heart disease    · Stroke    · Gallbladder or liver disease    · Cancer of the colon, breast, prostate, liver, or kidney    · Sleep apnea    · Arthritis or gout    Seek care immediately if:   · You have a severe headache, confusion, or difficulty speaking  · You have weakness on one side of your body  · You have chest pain, sweating, or shortness of breath      Call your doctor if:   · You have symptoms of gallbladder or liver disease, such as pain in your upper abdomen  · You have knee or hip pain and discomfort while walking  · You have symptoms of diabetes, such as intense hunger and thirst, and frequent urination  · You have symptoms of sleep apnea, such as snoring or daytime sleepiness  · You have questions or concerns about your condition or care  Treatment for obesity  focuses on helping you lose weight to improve your health  Even a small decrease in BMI can reduce the risk for many health problems  Your healthcare provider will help you set a weight-loss goal   · Lifestyle changes  are the first step in treating obesity  These include making healthy food choices and getting regular physical activity  Your healthcare provider may suggest a weight-loss program that involves coaching, education, and therapy  · Medicine  may help you lose weight when it is used with a healthy foods and physical activity  · Surgery  can help you lose weight if you are very obese and have other health problems  There are several types of weight-loss surgery  Ask your healthcare provider for more information  Be successful losing weight:   · Set small, realistic goals  An example of a small goal is to walk for 20 minutes 5 days a week  Anther goal is to lose 5% of your body weight  · Tell friends, family members, and coworkers about your goals  and ask for their support  Ask a friend to lose weight with you, or join a weight-loss support group  · Identify foods or triggers that may cause you to overeat , and find ways to avoid them  Remove tempting high-calorie foods from your home and workplace  Place a bowl of fresh fruit on your kitchen counter  If stress causes you to eat, then find other ways to cope with stress  A counselor or therapist may be able to help you  · Keep a diary to track what you eat and drink    Also write down how many minutes of physical activity you do each day  Weigh yourself once a week and record it in your diary  Eating changes: You will need to eat 500 to 1,000 fewer calories each day than you currently eat to lose 1 to 2 pounds a week  The following changes will help you cut calories:  · Eat smaller portions  Use small plates, no larger than 9 inches in diameter  Fill your plate half full of fruits and vegetables  Measure your food using measuring cups until you know what a serving size looks like  · Eat 3 meals and 1 or 2 snacks each day  Plan your meals in advance  Shonna Butterfield and eat at home most of the time  Eat slowly  Do not skip meals  Skipping meals can lead to overeating later in the day  This can make it harder for you to lose weight  Talk with a dietitian to help you make a meal plan and schedule that is right for you  · Eat fruits and vegetables at every meal   They are low in calories and high in fiber, which makes you feel full  Do not add butter, margarine, or cream sauce to vegetables  Use herbs to season steamed vegetables  · Eat less fat and fewer fried foods  Eat more baked or grilled chicken and fish  These protein sources are lower in calories and fat than red meat  Limit fast food  Dress your salads with olive oil and vinegar instead of bottled dressing  · Limit the amount of sugar you eat  Do not drink sugary beverages  Limit alcohol  Activity changes:  Physical activity is good for your body in many ways  It helps you burn calories and build strong muscles  It decreases stress and depression, and improves your mood  It can also help you sleep better  Talk to your healthcare provider before you begin an exercise program   · Exercise for at least 30 minutes 5 days a week  Start slowly  Set aside time each day for physical activity that you enjoy and that is convenient for you  It is best to do both weight training and an activity that increases your heart rate, such as walking, bicycling, or swimming  · Find ways to be more active  Do yard work and housecleaning  Walk up the stairs instead of using elevators  Spend your leisure time going to events that require walking, such as outdoor festivals or fairs  This extra physical activity can help you lose weight and keep it off  Follow up with your doctor as directed: You may need to meet with a dietitian  Write down your questions so you remember to ask them during your visits  © Copyright [employer] 2021 Information is for End User's use only and may not be sold, redistributed or otherwise used for commercial purposes  All illustrations and images included in CareNotes® are the copyrighted property of [employer]  or [employer]   The above information is an  only  It is not intended as medical advice for individual conditions or treatments  Talk to your doctor, nurse or pharmacist before following any medical regimen to see if it is safe and effective for you  Return in about 3 months (around 4/14/2022) for Recheck  Subjective:      Patient ID: Delbert Castro is a 64 y o  male  Chief Complaint   Patient presents with    Follow-up     3 months  [id number]       Pt is here for a three month follow up  Pt denies CP, no SOB  Had labs  Pt sees DR Yony Jones 1 month ago  Pt was seen in June for his eyes DR Shaka Nagy      The following portions of the patient's history were reviewed and updated as appropriate: allergies, current medications, past family history, past medical history, past social history, past surgical history and problem list     Review of Systems   Constitutional: Negative for activity change, appetite change, chills, diaphoresis, fatigue, fever and unexpected weight change  HENT: Negative for congestion, dental problem, ear pain, mouth sores, sinus pressure, sinus pain, sore throat and trouble swallowing  Eyes: Negative for photophobia, discharge and itching     Respiratory: Negative for apnea, chest tightness and shortness of breath  Cardiovascular: Negative for chest pain, palpitations and leg swelling  Gastrointestinal: Negative for abdominal distention, abdominal pain, blood in stool, nausea and vomiting  Endocrine: Negative for cold intolerance, heat intolerance, polydipsia, polyphagia and polyuria  Genitourinary: Negative for difficulty urinating  Musculoskeletal: Negative for arthralgias  Skin: Negative for color change and wound  Skin lesion   Neurological: Negative for dizziness, syncope, speech difficulty and headaches  Hematological: Negative for adenopathy  Psychiatric/Behavioral: Negative for agitation and behavioral problems           Current Outpatient Medications   Medication Sig Dispense Refill    amLODIPine (NORVASC) 10 mg tablet Take 1 tablet (10 mg total) by mouth daily 90 tablet 1    aspirin 81 mg chewable tablet Chew 81 mg daily      clopidogrel (PLAVIX) 75 mg tablet Take 75 mg by mouth daily      gabapentin (NEURONTIN) 600 MG tablet Take 1 tablet (600 mg total) by mouth 2 (two) times a day 180 tablet 0    glimepiride (AMARYL) 2 mg tablet Take 1 tablet (2 mg total) by mouth 2 (two) times a day      hydrochlorothiazide (HYDRODIURIL) 25 mg tablet Take 25 mg by mouth daily      losartan (COZAAR) 100 MG tablet Take 1 tablet (100 mg total) by mouth daily 90 tablet 2    metFORMIN (GLUCOPHAGE) 1000 MG tablet Take 1 tablet (1,000 mg total) by mouth 2 (two) times a day with meals 180 tablet 0    metoprolol succinate (TOPROL-XL) 50 mg 24 hr tablet Take 1 tablet (50 mg total) by mouth daily 90 tablet 3    Multiple Vitamins-Minerals (MULTIVITAMIN MEN 50+ PO) Take by mouth daily      Omega-3 Fatty Acids (fish oil) 1,000 mg Take 4,000 mg by mouth daily       sitaGLIPtin (JANUVIA) 100 mg tablet Take 100 mg by mouth daily      atorvastatin (LIPITOR) 40 mg tablet Take 40 mg by mouth daily      canagliflozin (Invokana) 100 mg Take 100 mg by mouth daily before breakfast (Patient not taking: Reported on 1/13/2022 )      Empagliflozin (Jardiance) 25 MG TABS Take 1 tablet (25 mg total) by mouth every morning 90 tablet 1     No current facility-administered medications for this visit  Objective:    /70   Pulse 64   Temp 98 °F (36 7 °C)   Resp 18   Ht 6' 2" (1 88 m)   Wt 116 kg (255 lb)   BMI 32 74 kg/m²        Physical Exam  Skin:     Comments: Lesions on scalp flaky              Recent Results (from the past 672 hour(s))   Hepatitis C antibody    Collection Time: 01/11/22  7:26 AM   Result Value Ref Range    Hepatitis C Ab Non-reactive Non-reactive   Comprehensive metabolic panel    Collection Time: 01/11/22  7:26 AM   Result Value Ref Range    Sodium 138 136 - 145 mmol/L    Potassium 4 0 3 5 - 5 3 mmol/L    Chloride 104 100 - 108 mmol/L    CO2 30 21 - 32 mmol/L    ANION GAP 4 4 - 13 mmol/L    BUN 25 5 - 25 mg/dL    Creatinine 1 13 0 60 - 1 30 mg/dL    Glucose, Fasting 202 (H) 65 - 99 mg/dL    Calcium 9 6 8 3 - 10 1 mg/dL    AST 21 5 - 45 U/L    ALT 37 12 - 78 U/L    Alkaline Phosphatase 63 46 - 116 U/L    Total Protein 7 6 6 4 - 8 2 g/dL    Albumin 3 8 3 5 - 5 0 g/dL    Total Bilirubin 0 96 0 20 - 1 00 mg/dL    eGFR 61 ml/min/1 73sq m   Microalbumin / creatinine urine ratio    Collection Time: 01/11/22  7:26 AM   Result Value Ref Range    Creatinine, Ur 126 0 mg/dL    Microalbum  ,U,Random 72 2 (H) 0 0 - 20 0 mg/L    Microalb Creat Ratio 57 (H) 0 - 30 mg/g creatinine   Lipid Panel with Direct LDL reflex    Collection Time: 01/11/22  7:26 AM   Result Value Ref Range    Cholesterol 131 See Comment mg/dL    Triglycerides 189 (H) See Comment mg/dL    HDL, Direct 44 >=40 mg/dL    LDL Calculated 49 0 - 100 mg/dL   Hemoglobin A1C    Collection Time: 01/11/22  7:26 AM   Result Value Ref Range    Hemoglobin A1C 8 9 (H) Normal 3 8-5 6%; PreDiabetic 5 7-6 4%;  Diabetic >=6 5%; Glycemic control for adults with diabetes <7 0% %     mg/dl         Bonnie Night, DO  BMI Counseling: Body mass index is 32 74 kg/m²  The BMI is above normal  Nutrition recommendations include reducing portion sizes

## 2022-01-18 ENCOUNTER — TELEPHONE (OUTPATIENT)
Dept: FAMILY MEDICINE CLINIC | Facility: CLINIC | Age: 77
End: 2022-01-18

## 2022-01-18 NOTE — TELEPHONE ENCOUNTER
Prior Auth for Jardiance 25mg on Cover [employer] [id number]  ID [id number]  Forms completed and approved until 12/31/2022   Reference number [id number]  Pt  [place]    JAIDA sparks

## 2022-02-02 DIAGNOSIS — E11.42 TYPE 2 DIABETES MELLITUS WITH DIABETIC POLYNEUROPATHY, WITHOUT LONG-TERM CURRENT USE OF INSULIN (HCC): ICD-10-CM

## 2022-02-16 ENCOUNTER — HOSPITAL ENCOUNTER (INPATIENT)
Facility: HOSPITAL | Age: 77
LOS: 1 days | Discharge: HOME | DRG: 313 | End: 2022-02-17
Attending: EMERGENCY MEDICINE | Admitting: STUDENT IN AN ORGANIZED HEALTH CARE EDUCATION/TRAINING PROGRAM
Payer: COMMERCIAL

## 2022-02-16 ENCOUNTER — APPOINTMENT (EMERGENCY)
Dept: RADIOLOGY | Facility: HOSPITAL | Age: 77
DRG: 313 | End: 2022-02-16
Payer: COMMERCIAL

## 2022-02-16 DIAGNOSIS — I20.8 STABLE ANGINA PECTORIS (HCC): ICD-10-CM

## 2022-02-16 DIAGNOSIS — I20.0 UNSTABLE ANGINA (HCC): Primary | ICD-10-CM

## 2022-02-16 PROBLEM — R07.9 CHEST PAIN: Status: ACTIVE | Noted: 2022-02-16

## 2022-02-16 PROBLEM — N18.9 CKD (CHRONIC KIDNEY DISEASE): Status: ACTIVE | Noted: 2022-02-16

## 2022-02-16 LAB
2HR DELTA HS TROPONIN: 3 NG/L
4HR DELTA HS TROPONIN: 5 NG/L
ALBUMIN SERPL BCP-MCNC: 3.7 G/DL (ref 3.5–5)
ALP SERPL-CCNC: 76 U/L (ref 46–116)
ALT SERPL W P-5'-P-CCNC: 46 U/L (ref 12–78)
ANION GAP SERPL CALCULATED.3IONS-SCNC: 8 MMOL/L (ref 4–13)
APTT PPP: 30 SECONDS (ref 23–37)
AST SERPL W P-5'-P-CCNC: 23 U/L (ref 5–45)
BASOPHILS # BLD AUTO: 0.04 THOUSANDS/ΜL (ref 0–0.1)
BASOPHILS NFR BLD AUTO: 1 % (ref 0–1)
BILIRUB SERPL-MCNC: 0.66 MG/DL (ref 0.2–1)
BUN SERPL-MCNC: 28 MG/DL (ref 5–25)
CALCIUM SERPL-MCNC: 8.6 MG/DL (ref 8.3–10.1)
CARDIAC TROPONIN I PNL SERPL HS: 25 NG/L
CARDIAC TROPONIN I PNL SERPL HS: 28 NG/L
CARDIAC TROPONIN I PNL SERPL HS: 30 NG/L
CHLORIDE SERPL-SCNC: 99 MMOL/L (ref 100–108)
CO2 SERPL-SCNC: 28 MMOL/L (ref 21–32)
CREAT SERPL-MCNC: 1.32 MG/DL (ref 0.6–1.3)
EOSINOPHIL # BLD AUTO: 0.1 THOUSAND/ΜL (ref 0–0.61)
EOSINOPHIL NFR BLD AUTO: 1 % (ref 0–6)
ERYTHROCYTE [DISTWIDTH] IN BLOOD BY AUTOMATED COUNT: 12.9 % (ref 11.6–15.1)
FLUAV RNA RESP QL NAA+PROBE: NEGATIVE
FLUBV RNA RESP QL NAA+PROBE: NEGATIVE
GFR SERPL CREATININE-BSD FRML MDRD: 50 ML/MIN/1.73SQ M
GLUCOSE SERPL-MCNC: 289 MG/DL (ref 65–140)
GLUCOSE SERPL-MCNC: 439 MG/DL (ref 65–140)
HCT VFR BLD AUTO: 39.2 % (ref 36.5–49.3)
HGB BLD-MCNC: 13.1 G/DL (ref 12–17)
IMM GRANULOCYTES # BLD AUTO: 0.03 THOUSAND/UL (ref 0–0.2)
IMM GRANULOCYTES NFR BLD AUTO: 0 % (ref 0–2)
INR PPP: 1.05 (ref 0.84–1.19)
LYMPHOCYTES # BLD AUTO: 1.61 THOUSANDS/ΜL (ref 0.6–4.47)
LYMPHOCYTES NFR BLD AUTO: 22 % (ref 14–44)
MAGNESIUM SERPL-MCNC: 1.8 MG/DL (ref 1.6–2.6)
MCH RBC QN AUTO: 28.7 PG (ref 26.8–34.3)
MCHC RBC AUTO-ENTMCNC: 33.4 G/DL (ref 31.4–37.4)
MCV RBC AUTO: 86 FL (ref 82–98)
MONOCYTES # BLD AUTO: 0.49 THOUSAND/ΜL (ref 0.17–1.22)
MONOCYTES NFR BLD AUTO: 7 % (ref 4–12)
NEUTROPHILS # BLD AUTO: 5.21 THOUSANDS/ΜL (ref 1.85–7.62)
NEUTS SEG NFR BLD AUTO: 69 % (ref 43–75)
NRBC BLD AUTO-RTO: 0 /100 WBCS
NT-PROBNP SERPL-MCNC: 420 PG/ML
PLATELET # BLD AUTO: 160 THOUSANDS/UL (ref 149–390)
PMV BLD AUTO: 11.5 FL (ref 8.9–12.7)
POTASSIUM SERPL-SCNC: 3.9 MMOL/L (ref 3.5–5.3)
PROT SERPL-MCNC: 7.6 G/DL (ref 6.4–8.2)
PROTHROMBIN TIME: 13.5 SECONDS (ref 11.6–14.5)
RBC # BLD AUTO: 4.56 MILLION/UL (ref 3.88–5.62)
RSV RNA RESP QL NAA+PROBE: NEGATIVE
SARS-COV-2 RNA RESP QL NAA+PROBE: NEGATIVE
SODIUM SERPL-SCNC: 135 MMOL/L (ref 136–145)
WBC # BLD AUTO: 7.48 THOUSAND/UL (ref 4.31–10.16)

## 2022-02-16 PROCEDURE — 99285 EMERGENCY DEPT VISIT HI MDM: CPT | Performed by: EMERGENCY MEDICINE

## 2022-02-16 PROCEDURE — 99285 EMERGENCY DEPT VISIT HI MDM: CPT

## 2022-02-16 PROCEDURE — 83735 ASSAY OF MAGNESIUM: CPT | Performed by: EMERGENCY MEDICINE

## 2022-02-16 PROCEDURE — 85025 COMPLETE CBC W/AUTO DIFF WBC: CPT | Performed by: EMERGENCY MEDICINE

## 2022-02-16 PROCEDURE — 80053 COMPREHEN METABOLIC PANEL: CPT | Performed by: EMERGENCY MEDICINE

## 2022-02-16 PROCEDURE — 85610 PROTHROMBIN TIME: CPT | Performed by: EMERGENCY MEDICINE

## 2022-02-16 PROCEDURE — 83880 ASSAY OF NATRIURETIC PEPTIDE: CPT | Performed by: EMERGENCY MEDICINE

## 2022-02-16 PROCEDURE — 84484 ASSAY OF TROPONIN QUANT: CPT | Performed by: EMERGENCY MEDICINE

## 2022-02-16 PROCEDURE — 99220 PR INITIAL OBSERVATION CARE/DAY 70 MINUTES: CPT | Performed by: NURSE PRACTITIONER

## 2022-02-16 PROCEDURE — 71045 X-RAY EXAM CHEST 1 VIEW: CPT

## 2022-02-16 PROCEDURE — 85730 THROMBOPLASTIN TIME PARTIAL: CPT | Performed by: EMERGENCY MEDICINE

## 2022-02-16 PROCEDURE — 82948 REAGENT STRIP/BLOOD GLUCOSE: CPT

## 2022-02-16 PROCEDURE — 0241U HB NFCT DS VIR RESP RNA 4 TRGT: CPT | Performed by: EMERGENCY MEDICINE

## 2022-02-16 PROCEDURE — 93005 ELECTROCARDIOGRAM TRACING: CPT

## 2022-02-16 PROCEDURE — 36415 COLL VENOUS BLD VENIPUNCTURE: CPT | Performed by: EMERGENCY MEDICINE

## 2022-02-16 RX ORDER — ASPIRIN 81 MG/1
81 TABLET, CHEWABLE ORAL DAILY
Status: DISCONTINUED | OUTPATIENT
Start: 2022-02-17 | End: 2022-02-17 | Stop reason: HOSPADM

## 2022-02-16 RX ORDER — GABAPENTIN 300 MG/1
600 CAPSULE ORAL 2 TIMES DAILY
Status: DISCONTINUED | OUTPATIENT
Start: 2022-02-16 | End: 2022-02-17 | Stop reason: HOSPADM

## 2022-02-16 RX ORDER — ONDANSETRON 2 MG/ML
4 INJECTION INTRAMUSCULAR; INTRAVENOUS EVERY 6 HOURS PRN
Status: DISCONTINUED | OUTPATIENT
Start: 2022-02-16 | End: 2022-02-17 | Stop reason: HOSPADM

## 2022-02-16 RX ORDER — LOSARTAN POTASSIUM 50 MG/1
100 TABLET ORAL DAILY
Status: DISCONTINUED | OUTPATIENT
Start: 2022-02-17 | End: 2022-02-17 | Stop reason: HOSPADM

## 2022-02-16 RX ORDER — CLOPIDOGREL BISULFATE 75 MG/1
75 TABLET ORAL DAILY
Status: DISCONTINUED | OUTPATIENT
Start: 2022-02-17 | End: 2022-02-17 | Stop reason: HOSPADM

## 2022-02-16 RX ORDER — METOPROLOL SUCCINATE 50 MG/1
50 TABLET, EXTENDED RELEASE ORAL DAILY
Status: DISCONTINUED | OUTPATIENT
Start: 2022-02-17 | End: 2022-02-17 | Stop reason: HOSPADM

## 2022-02-16 RX ORDER — AMLODIPINE BESYLATE 10 MG/1
10 TABLET ORAL DAILY
Status: DISCONTINUED | OUTPATIENT
Start: 2022-02-17 | End: 2022-02-17 | Stop reason: HOSPADM

## 2022-02-16 RX ORDER — CHLORAL HYDRATE 500 MG
4000 CAPSULE ORAL DAILY
Status: DISCONTINUED | OUTPATIENT
Start: 2022-02-17 | End: 2022-02-17 | Stop reason: HOSPADM

## 2022-02-16 RX ORDER — ATORVASTATIN CALCIUM 40 MG/1
40 TABLET, FILM COATED ORAL
Status: DISCONTINUED | OUTPATIENT
Start: 2022-02-17 | End: 2022-02-17 | Stop reason: HOSPADM

## 2022-02-16 RX ADMIN — INSULIN LISPRO 6 UNITS: 100 INJECTION, SOLUTION INTRAVENOUS; SUBCUTANEOUS at 22:38

## 2022-02-16 RX ADMIN — GABAPENTIN 600 MG: 300 CAPSULE ORAL at 22:38

## 2022-02-16 RX ADMIN — ENOXAPARIN SODIUM 120 MG: 120 INJECTION SUBCUTANEOUS at 19:10

## 2022-02-16 NOTE — ED PROVIDER NOTES
History  Chief Complaint   Patient presents with    Chest Pain     Pt states chest pain developed today during strenuous work about 11:00 today  Slightly dizzy and had a hard time catching his breath  75 yo male c/o episodes of chest pain and sob with exertion over the last couple weeks  Symptoms resolve pretty quickly with rest   He describes the pain as substernal, chest pressure, non-radiating  Today he was doing housework and was carrying a piece of wood and developed more severe symptoms  Daughter says his face was gray and he felt warm but was not visibly sweaty  He "collapsed" into a chair and symptoms were gone by the time EMS arrived  He is not having chest pain at this moment  He did get 4 baby aspirin prehospital   He has a significant cardiac history, is s/p PCI about a year and a half ago at another hospital   No recent illness  History provided by:  Patient   used: No    Chest Pain  Associated symptoms: shortness of breath    Associated symptoms: no abdominal pain, no back pain, no cough, no dizziness, no fever, no headache, no nausea and not vomiting        Prior to Admission Medications   Prescriptions Last Dose Informant Patient Reported? Taking?    Empagliflozin (Jardiance) 25 MG TABS   No No   Sig: Take 1 tablet (25 mg total) by mouth every morning   Multiple Vitamins-Minerals (MULTIVITAMIN MEN 50+ PO)  Self Yes No   Sig: Take by mouth daily   Omega-3 Fatty Acids (fish oil) 1,000 mg  Self Yes No   Sig: Take 4,000 mg by mouth daily    amLODIPine (NORVASC) 10 mg tablet  Self No No   Sig: Take 1 tablet (10 mg total) by mouth daily   aspirin 81 mg chewable tablet  Self Yes No   Sig: Chew 81 mg daily   atorvastatin (LIPITOR) 40 mg tablet  Self Yes No   Sig: Take 40 mg by mouth daily   canagliflozin (Invokana) 100 mg  Self Yes No   Sig: Take 100 mg by mouth daily before breakfast    Patient not taking: Reported on 1/13/2022    clopidogrel (PLAVIX) 75 mg tablet  Self Yes No   Sig: Take 75 mg by mouth daily   gabapentin (NEURONTIN) 600 MG tablet  Self No No   Sig: Take 1 tablet (600 mg total) by mouth 2 (two) times a day   glimepiride (AMARYL) 2 mg tablet   No No   Sig: Take 1 tablet (2 mg total) by mouth 2 (two) times a day   hydrochlorothiazide (HYDRODIURIL) 25 mg tablet  Self Yes No   Sig: Take 25 mg by mouth daily   losartan (COZAAR) 100 MG tablet   No No   Sig: Take 1 tablet (100 mg total) by mouth daily   metFORMIN (GLUCOPHAGE) 1000 MG tablet   No No   Sig: Take 1 tablet (1,000 mg total) by mouth 2 (two) times a day with meals   metoprolol succinate (TOPROL-XL) 50 mg 24 hr tablet  Self No No   Sig: Take 1 tablet (50 mg total) by mouth daily   sitaGLIPtin (JANUVIA) 100 mg tablet  Self Yes No   Sig: Take 100 mg by mouth daily      Facility-Administered Medications: None       Past Medical History:   Diagnosis Date    CAD (coronary artery disease)     Cancer ([place] [address] )     prostate    [facility]     Diabetes mellitus ([facility] )     [facility] infarction [facility])        Past Surgical History:   Procedure Laterality Date    ADENOIDECTOMY      CARDIAC SURGERY      3 cardiac bypass then angioplasty 2020    CHOLECYSTECTOMY      CORONARY ARTERY BYPASS GRAFT      PROSTATE SURGERY      TONSILLECTOMY         Family History   Problem Relation Age of Onset    Diabetes Mother     Alcohol abuse Father     Mental illness Neg Hx      I have reviewed and agree with the history as documented  E-Cigarette/Vaping    E-Cigarette Use Never User      E-Cigarette/Vaping Substances    Nicotine No     THC No     CBD No     Flavoring No     Other No     Unknown No      Social History     Tobacco Use    Smoking status: Former Smoker     Types: Cigarettes     Quit date:      Years since quittin 1    Smokeless tobacco: Never Used   Vaping Use    Vaping Use: Never used   Substance Use Topics    Alcohol use:  Yes     Alcohol/week: 14 0 standard drinks Types: 14 Cans of beer per week     Comment: 1 -2 daily    Drug use: Never       Review of Systems   Constitutional: Negative  Negative for chills and fever  HENT: Negative  Negative for congestion and sore throat  Eyes: Negative  Respiratory: Positive for shortness of breath  Negative for cough  Cardiovascular: Positive for chest pain  Negative for leg swelling  Gastrointestinal: Negative  Negative for abdominal pain, diarrhea, nausea and vomiting  Genitourinary: Negative  Negative for dysuria, flank pain and hematuria  Musculoskeletal: Negative  Negative for back pain and myalgias  Skin: Negative  Negative for rash and wound  Neurological: Negative  Negative for dizziness and headaches  Psychiatric/Behavioral: Negative  Negative for confusion and hallucinations  The patient is not nervous/anxious  All other systems reviewed and are negative  Physical Exam  Physical Exam  Vitals and nursing note reviewed  Constitutional:       General: He is not in acute distress  Appearance: He is well-developed  He is obese  He is not ill-appearing or diaphoretic  HENT:      Head: Normocephalic and atraumatic  Right Ear: External ear normal       Left Ear: External ear normal    Eyes:      General: No scleral icterus  Conjunctiva/sclera: Conjunctivae normal    Cardiovascular:      Rate and Rhythm: Normal rate and regular rhythm  Heart sounds: Murmur heard  Pulmonary:      Effort: Pulmonary effort is normal  No respiratory distress  Breath sounds: Normal breath sounds  Chest:      Chest wall: No tenderness  Abdominal:      General: Bowel sounds are normal  There is no distension  Palpations: Abdomen is soft  Tenderness: There is no abdominal tenderness  Musculoskeletal:         General: No tenderness or deformity  Normal range of motion  Cervical back: Normal range of motion and neck supple  Right lower leg: Edema present  Left lower leg: Edema present  Comments: Mild pretibial edema   Skin:     General: Skin is warm and dry  Coloration: Skin is not pale  Findings: No erythema or rash  Neurological:      General: No focal deficit present  Mental Status: He is alert and oriented to person, place, and time  Motor: No weakness     Psychiatric:         Mood and Affect: Mood normal          Behavior: Behavior normal          Vital Signs  ED Triage Vitals [02/16/22 1525]   Temperature Pulse Respirations Blood Pressure SpO2   97 5 °F (36 4 °C) 77 18 169/80 98 %      Temp Source Heart Rate Source Patient Position - Orthostatic VS BP Location FiO2 (%)   Tympanic Monitor Lying Left arm --      Pain Score       No Pain           Vitals:    02/16/22 1630 02/16/22 1645 02/16/22 1700 02/16/22 1715   BP:  160/72 (!) 172/75    Pulse: 71 69 65 63   Patient Position - Orthostatic VS:  Lying Lying          Visual Acuity      ED Medications  Medications - No data to display    Diagnostic Studies  Results Reviewed     Procedure Component Value Units Date/Time    HS Troponin I 2hr [[id number]]  (Normal) Collected: 02/16/22 1757    Lab Status: Final result Specimen: Blood from Hand, Left Updated: 02/16/22 1825     hs TnI 2hr 28 ng/L      Delta 2hr hsTnI 3 ng/L     NT-BNP PRO [[id number]]  (Normal) Collected: 02/16/22 1549    Lab Status: Final result Specimen: Blood from Arm, Left Updated: 02/16/22 1704     NT-proBNP 420 pg/mL     Magnesium [[id number]]  (Normal) Collected: 02/16/22 1549    Lab Status: Final result Specimen: Blood from Arm, Left Updated: 02/16/22 1704     Magnesium 1 8 mg/dL     HS Troponin I 4hr [[id number]]     Lab Status: No result Specimen: Blood     HS Troponin 0hr (reflex protocol) [[id number]]  (Normal) Collected: 02/16/22 1549    Lab Status: Final result Specimen: Blood from Arm, Left Updated: 02/16/22 1618     hs TnI 0hr 25 ng/L     Protime-INR [[id number]]  (Normal) Collected: 02/16/22 1549    Lab Status: Final result Specimen: Blood from Arm, Left Updated: 02/16/22 1613     Protime 13 5 seconds      INR 1 05    APTT [[id number]]  (Normal) Collected: 02/16/22 1549    Lab Status: Final result Specimen: Blood from Arm, Left Updated: 02/16/22 1613     PTT 30 seconds     Comprehensive metabolic panel [[id number]]  (Abnormal) Collected: 02/16/22 1549    Lab Status: Final result Specimen: Blood from Arm, Left Updated: 02/16/22 1609     Sodium 135 mmol/L      Potassium 3 9 mmol/L      Chloride 99 mmol/L      CO2 28 mmol/L      ANION GAP 8 mmol/L      BUN 28 mg/dL      Creatinine 1 32 mg/dL      Glucose 439 mg/dL      Calcium 8 6 mg/dL      AST 23 U/L      ALT 46 U/L      Alkaline Phosphatase 76 U/L      Total Protein 7 6 g/dL      Albumin 3 7 g/dL      Total Bilirubin 0 66 mg/dL      eGFR 50 ml/min/1 73sq m     Narrative:      National Kidney Disease Foundation guidelines for Chronic Kidney Disease (CKD):     Stage 1 with normal or high GFR (GFR > 90 mL/min/1 73 square meters)    Stage 2 Mild CKD (GFR = 60-89 mL/min/1 73 square meters)    Stage 3A Moderate CKD (GFR = 45-59 mL/min/1 73 square meters)    Stage 3B Moderate CKD (GFR = 30-44 mL/min/1 73 square meters)    Stage 4 Severe CKD (GFR = 15-29 mL/min/1 73 square meters)    Stage 5 End Stage CKD (GFR <15 mL/min/1 73 square meters)  Note: GFR calculation is accurate only with a steady state creatinine    CBC and differential [[id number]] Collected: 02/16/22 1549    Lab Status: Final result Specimen: Blood from Arm, Left Updated: 02/16/22 1554     WBC 7 48 Thousand/uL      RBC 4 56 Million/uL      Hemoglobin 13 1 g/dL      Hematocrit 39 2 %      MCV 86 fL      MCH 28 7 pg      MCHC 33 4 g/dL      RDW 12 9 %      MPV 11 5 fL      Platelets 461 Thousands/uL      nRBC 0 /100 WBCs      Neutrophils Relative 69 %      Immat GRANS % 0 %      Lymphocytes Relative 22 %      Monocytes Relative 7 %      Eosinophils Relative 1 %      Basophils Relative 1 %      Neutrophils Absolute 5 21 Thousands/µL      Immature Grans Absolute 0 03 Thousand/uL      Lymphocytes Absolute 1 61 Thousands/µL      Monocytes Absolute 0 49 Thousand/µL      Eosinophils Absolute 0 10 Thousand/µL      Basophils Absolute 0 04 Thousands/µL                  XR chest 1 view portable   ED Interpretation by Sanjuana Lloyd MD (10/20 3301)   CM, poor insp      Final Result by Saroj Russell MD (02/16 1711)      No acute cardiopulmonary disease  Mild bilateral scar  Workstation performed: [id number]                    Procedures  ECG 12 Lead Documentation Only    Date/Time: 2/16/2022 3:42 PM  Performed by: Sanjuana Lloyd MD  Authorized by: Sanjuana Lloyd MD     Indications / Diagnosis:  Chest pain  ECG reviewed by me, the ED Provider: yes    Patient location:  ED  Previous ECG:     Previous ECG:  Compared to current    Similarity:  Changes noted  Interpretation:     Interpretation: abnormal    Rate:     ECG rate:  83    ECG rate assessment: normal    Rhythm:     Rhythm: sinus rhythm    Ectopy:     Ectopy: PVCs      PVCs:  Infrequent  QRS:     QRS axis:  Left  Conduction:     Conduction: abnormal      Abnormal conduction: 1st degree    ST segments:     ST segments:  Normal  T waves:     T waves: normal    Other findings:     Other findings: LVH               ED Course             HEART Risk Score      Most Recent Value   Heart Score Risk Calculator    History 2 Filed at: 02/16/2022 1846   ECG 1 Filed at: 02/16/2022 1846   Age 2 Filed at: 02/16/2022 1846   Risk Factors 2 Filed at: 02/16/2022 1846   Troponin 1 Filed at: 02/16/2022 1846   HEART Score 8 Filed at: 02/16/2022 1846                        SBIRT 22yo+      Most Recent Value   SBIRT (25 yo +)    In order to provide better care to our patients, we are screening all of our patients for alcohol and drug use  Would it be okay to ask you these screening questions?  No Filed at: 02/16/2022 1533                    MDM  Number of Diagnoses or Management Options  Un[facility] ang[facility] ([facility] )  Diagnosis management comments: Delta trop 3  BNP only 420 and no rales on exam   Discussed with Dr Joaquina Palacio  Will admit for unstable angina and further evaluation and he advised give shot of lovenox  Disposition  Final diagnoses:   Unstable angina ([facility] )     Time reflects when diagnosis was documented in both MDM as applicable and the Disposition within this note     Time User Action Codes Description Comment    1/74/7633  5:58 PM Afshin ALMANZA Add [K80 9] Unstable angina [facility])       ED Disposition     ED Disposition Condition Date/Time Comment    Admit Stable Wed Feb 16, 2022  6:43 PM Case was discussed with **Dr Joaquina Palacio, hospitalist* and the patient's admission status was agreed to be Admission Status: observation status to the service of Dr Gunnhospitalist**   Follow-up Information    None         Patient's Medications   Discharge Prescriptions    No medications on file       No discharge procedures on file      PDMP Review     None          ED Provider  Electronically Signed by           Aleksander Granger MD  32/38/43 8804

## 2022-02-17 ENCOUNTER — APPOINTMENT (OUTPATIENT)
Dept: NON INVASIVE DIAGNOSTICS | Facility: HOSPITAL | Age: 77
DRG: 313 | End: 2022-02-17
Payer: COMMERCIAL

## 2022-02-17 ENCOUNTER — APPOINTMENT (OUTPATIENT)
Dept: RADIOLOGY | Facility: HOSPITAL | Age: 77
DRG: 313 | End: 2022-02-17
Payer: COMMERCIAL

## 2022-02-17 VITALS
WEIGHT: 253.75 LBS | HEIGHT: 74 IN | OXYGEN SATURATION: 97 % | DIASTOLIC BLOOD PRESSURE: 71 MMHG | TEMPERATURE: 97 F | RESPIRATION RATE: 18 BRPM | BODY MASS INDEX: 32.57 KG/M2 | SYSTOLIC BLOOD PRESSURE: 158 MMHG | HEART RATE: 58 BPM

## 2022-02-17 LAB
ALBUMIN SERPL BCP-MCNC: 3.2 G/DL (ref 3.5–5)
ALP SERPL-CCNC: 60 U/L (ref 46–116)
ALT SERPL W P-5'-P-CCNC: 39 U/L (ref 12–78)
ANION GAP SERPL CALCULATED.3IONS-SCNC: 8 MMOL/L (ref 4–13)
AST SERPL W P-5'-P-CCNC: 27 U/L (ref 5–45)
ATRIAL RATE: 83 BPM
BASELINE ST DEPRESSION: 0 MM
BILIRUB SERPL-MCNC: 0.53 MG/DL (ref 0.2–1)
BUN SERPL-MCNC: 22 MG/DL (ref 5–25)
CALCIUM ALBUM COR SERPL-MCNC: 8.9 MG/DL (ref 8.3–10.1)
CALCIUM SERPL-MCNC: 8.3 MG/DL (ref 8.3–10.1)
CHEST PAIN STATEMENT: NORMAL
CHLORIDE SERPL-SCNC: 105 MMOL/L (ref 100–108)
CO2 SERPL-SCNC: 27 MMOL/L (ref 21–32)
CREAT SERPL-MCNC: 1.04 MG/DL (ref 0.6–1.3)
ERYTHROCYTE [DISTWIDTH] IN BLOOD BY AUTOMATED COUNT: 13.1 % (ref 11.6–15.1)
GFR SERPL CREATININE-BSD FRML MDRD: 67 ML/MIN/1.73SQ M
GLUCOSE P FAST SERPL-MCNC: 208 MG/DL (ref 65–99)
GLUCOSE SERPL-MCNC: 208 MG/DL (ref 65–140)
GLUCOSE SERPL-MCNC: 224 MG/DL (ref 65–140)
GLUCOSE SERPL-MCNC: 262 MG/DL (ref 65–140)
GLUCOSE SERPL-MCNC: 284 MG/DL (ref 65–140)
GLUCOSE SERPL-MCNC: 335 MG/DL (ref 65–140)
HCT VFR BLD AUTO: 35.2 % (ref 36.5–49.3)
HGB BLD-MCNC: 11.8 G/DL (ref 12–17)
MAGNESIUM SERPL-MCNC: 1.9 MG/DL (ref 1.6–2.6)
MAX DIASTOLIC BP: 84 MMHG
MAX HEART RATE: 88 BPM
MAX HR PERCENT: 62 %
MAX HR: 141 BPM
MAX PREDICTED HEART RATE: 141 BPM
MAX. SYSTOLIC BP: 182 MMHG
MCH RBC QN AUTO: 28.9 PG (ref 26.8–34.3)
MCHC RBC AUTO-ENTMCNC: 33.5 G/DL (ref 31.4–37.4)
MCV RBC AUTO: 86 FL (ref 82–98)
NUC STRESS EJECTION FRACTION: 40 %
P AXIS: 76 DEGREES
PLATELET # BLD AUTO: 130 THOUSANDS/UL (ref 149–390)
PMV BLD AUTO: 11.2 FL (ref 8.9–12.7)
POTASSIUM SERPL-SCNC: 3.7 MMOL/L (ref 3.5–5.3)
PR INTERVAL: 204 MS
PROT SERPL-MCNC: 6.8 G/DL (ref 6.4–8.2)
PROTOCOL NAME: NORMAL
QRS AXIS: -60 DEGREES
QRSD INTERVAL: 112 MS
QT INTERVAL: 398 MS
QTC INTERVAL: 467 MS
RATE PRESSURE PRODUCT: NORMAL
RBC # BLD AUTO: 4.09 MILLION/UL (ref 3.88–5.62)
REASON FOR TERMINATION: NORMAL
SL CV REST NUCLEAR ISOTOPE DOSE: 15.8 MCI
SL CV STRESS NUCLEAR ISOTOPE DOSE: 44.1 MCI
SL CV STRESS RECOVERY BP: NORMAL MMHG
SL CV STRESS RECOVERY HR: 64 BPM
SL CV STRESS RECOVERY O2 SAT: 96 %
SODIUM SERPL-SCNC: 140 MMOL/L (ref 136–145)
STRESS ANGINA INDEX: 0
STRESS BASELINE BP: NORMAL MMHG
STRESS BASELINE HR: 63 BPM
STRESS DUKE TREADMILL SCORE: 1
STRESS O2 SAT REST: 97 %
STRESS PEAK HR: 88 BPM
STRESS PERCENT HR: 62 %
STRESS POST ESTIMATED WORKLOAD: 1 METS
STRESS POST EXERCISE DUR MIN: 1 MIN
STRESS POST O2 SAT PEAK: 97 %
STRESS POST PEAK BP: 182 MMHG
STRESS ST DEPRESSION: 0 MM
STRESS TARGET HR: 88 BPM
STRESS/REST PERFUSION RATIO: 1.05
T WAVE AXIS: 41 DEGREES
TARGET HR FORMULA: NORMAL
TIME IN EXERCISE PHASE: NORMAL
VENTRICULAR RATE: 83 BPM
WBC # BLD AUTO: 6.59 THOUSAND/UL (ref 4.31–10.16)

## 2022-02-17 PROCEDURE — 80053 COMPREHEN METABOLIC PANEL: CPT | Performed by: NURSE PRACTITIONER

## 2022-02-17 PROCEDURE — 82948 REAGENT STRIP/BLOOD GLUCOSE: CPT

## 2022-02-17 PROCEDURE — 83735 ASSAY OF MAGNESIUM: CPT | Performed by: NURSE PRACTITIONER

## 2022-02-17 PROCEDURE — 93018 CV STRESS TEST I&R ONLY: CPT | Performed by: INTERNAL MEDICINE

## 2022-02-17 PROCEDURE — A9502 TC99M TETROFOSMIN: HCPCS

## 2022-02-17 PROCEDURE — 99239 HOSP IP/OBS DSCHRG MGMT >30: CPT | Performed by: STUDENT IN AN ORGANIZED HEALTH CARE EDUCATION/TRAINING PROGRAM

## 2022-02-17 PROCEDURE — 93017 CV STRESS TEST TRACING ONLY: CPT

## 2022-02-17 PROCEDURE — 78452 HT MUSCLE IMAGE SPECT MULT: CPT | Performed by: INTERNAL MEDICINE

## 2022-02-17 PROCEDURE — 93016 CV STRESS TEST SUPVJ ONLY: CPT | Performed by: INTERNAL MEDICINE

## 2022-02-17 PROCEDURE — 85027 COMPLETE CBC AUTOMATED: CPT | Performed by: NURSE PRACTITIONER

## 2022-02-17 PROCEDURE — 93010 ELECTROCARDIOGRAM REPORT: CPT | Performed by: INTERNAL MEDICINE

## 2022-02-17 PROCEDURE — G1004 CDSM NDSC: HCPCS

## 2022-02-17 PROCEDURE — 78452 HT MUSCLE IMAGE SPECT MULT: CPT

## 2022-02-17 PROCEDURE — 99222 1ST HOSP IP/OBS MODERATE 55: CPT | Performed by: INTERNAL MEDICINE

## 2022-02-17 RX ORDER — ISOSORBIDE MONONITRATE 30 MG/1
30 TABLET, EXTENDED RELEASE ORAL DAILY
Qty: 30 TABLET | Refills: 0 | Status: SHIPPED | OUTPATIENT
Start: 2022-02-17 | End: 2022-05-10 | Stop reason: SDUPTHER

## 2022-02-17 RX ORDER — ISOSORBIDE MONONITRATE 30 MG/1
30 TABLET, EXTENDED RELEASE ORAL DAILY
Status: DISCONTINUED | OUTPATIENT
Start: 2022-02-17 | End: 2022-02-17 | Stop reason: HOSPADM

## 2022-02-17 RX ORDER — ISOSORBIDE MONONITRATE 30 MG/1
30 TABLET, EXTENDED RELEASE ORAL DAILY
Qty: 30 TABLET | Refills: 3 | Status: SHIPPED | OUTPATIENT
Start: 2022-02-17 | End: 2022-02-17

## 2022-02-17 RX ADMIN — ATORVASTATIN CALCIUM 40 MG: 40 TABLET, FILM COATED ORAL at 16:47

## 2022-02-17 RX ADMIN — ENOXAPARIN SODIUM 40 MG: 40 INJECTION SUBCUTANEOUS at 08:43

## 2022-02-17 RX ADMIN — INSULIN LISPRO 8 UNITS: 100 INJECTION, SOLUTION INTRAVENOUS; SUBCUTANEOUS at 12:41

## 2022-02-17 RX ADMIN — METOPROLOL SUCCINATE 50 MG: 50 TABLET, EXTENDED RELEASE ORAL at 08:44

## 2022-02-17 RX ADMIN — INSULIN LISPRO 6 UNITS: 100 INJECTION, SOLUTION INTRAVENOUS; SUBCUTANEOUS at 16:44

## 2022-02-17 RX ADMIN — INSULIN LISPRO 4 UNITS: 100 INJECTION, SOLUTION INTRAVENOUS; SUBCUTANEOUS at 07:53

## 2022-02-17 RX ADMIN — REGADENOSON 0.4 MG: 0.08 INJECTION, SOLUTION INTRAVENOUS at 11:01

## 2022-02-17 RX ADMIN — LOSARTAN POTASSIUM 100 MG: 50 TABLET, FILM COATED ORAL at 08:44

## 2022-02-17 RX ADMIN — GABAPENTIN 600 MG: 300 CAPSULE ORAL at 08:44

## 2022-02-17 RX ADMIN — OMEGA-3 FATTY ACIDS CAP 1000 MG 4000 MG: 1000 CAP at 08:43

## 2022-02-17 RX ADMIN — ASPIRIN 81 MG CHEWABLE TABLET 81 MG: 81 TABLET CHEWABLE at 08:44

## 2022-02-17 RX ADMIN — CLOPIDOGREL BISULFATE 75 MG: 75 TABLET ORAL at 08:44

## 2022-02-17 RX ADMIN — ISOSORBIDE MONONITRATE 30 MG: 30 TABLET, EXTENDED RELEASE ORAL at 15:23

## 2022-02-17 RX ADMIN — AMLODIPINE BESYLATE 10 MG: 10 TABLET ORAL at 08:44

## 2022-02-17 RX ADMIN — INSULIN LISPRO 6 UNITS: 100 INJECTION, SOLUTION INTRAVENOUS; SUBCUTANEOUS at 03:00

## 2022-02-17 NOTE — H&P
Kit U  64   H&P- Massimo Siddiqi 1945, 76 y o  male MRN: [id number]  Unit/Bed#: [address] [place] [address] Encounter: [id number]  Primary Care Provider: Ajay Vogel DO   Date and time admitted to hospital: 2/16/2022  3:24 PM    * Chest pain  Assessment & Plan  Patient reports midsternal pressure with exertion for about one week  No radiation of the pain  It is associated with some shortness of breath  Today the pain was severe prompting him to come to the ER  No pain at present  FOZIA 5  VTE Prophylaxis: Enoxaparin (Lovenox)  / sequential compression device   Code Status: No Order    Anticipated Length of Stay:  Patient will be admitted on an Observation basis with an anticipated length of stay of less than 2 midnights  Justification for Hospital Stay: chest pain     Total Time for Visit, including Counseling / Coordination of Care: 15 minutes  Greater than 50% of this total time spent on direct patient counseling and coordination of care  Chief Complaint:   Chest Pain (Pt states chest pain developed today during strenuous work about 11:00 today  Slightly dizzy and had a hard time catching his breath  )      History of Present Illness:    Massimo Siddiqi is a 76 y o  male with a PMH of coronary artery disease post stent and CABG, prostate cancer, CHF, diabetes who presents with chest pain  Patient reports midsternal pressure with exertion for about one week  No radiation of the pain  It is associated with some shortness of breath  Today the pain was severe prompting him to come to the ER  No pain at present  FOZIA 5  Review of Systems:    Review of Systems   Constitutional: Negative for chills and fever  HENT: Negative for ear pain and sore throat  Eyes: Negative for pain and visual disturbance  Respiratory: Positive for shortness of breath  Negative for cough  Cardiovascular: Positive for chest pain  Negative for palpitations     Gastrointestinal: Negative for abdominal pain and vomiting  Genitourinary: Negative for dysuria and hematuria  Musculoskeletal: Negative for arthralgias and back pain  Skin: Negative for color change and rash  Neurological: Negative for seizures and syncope  All other systems reviewed and are negative  Past Medical and Surgical History:     Past Medical History:   Diagnosis Date    CAD (coronary artery disease)     Cancer ([facility] )     prostate    CHF (congestive heart failure) (HCC)     Diabetes mellitus ([facility] )     Myocardial infarction [facility])        Past Surgical History:   Procedure Laterality Date    ADENOIDECTOMY      CARDIAC SURGERY  2002    3 cardiac bypass then angioplasty 7/2020    CHOLECYSTECTOMY      CORONARY ARTERY BYPASS GRAFT      PROSTATE SURGERY      TONSILLECTOMY         Meds/Allergies:    Prior to Admission medications    Medication Sig Start Date End Date Taking?  Authorizing Provider   amLODIPine (NORVASC) 10 mg tablet Take 1 tablet (10 mg total) by mouth daily 10/14/21  Yes Raúl Faye, DO   aspirin 81 mg chewable tablet Chew 81 mg daily   Yes Historical Provider, MD   atorvastatin (LIPITOR) 40 mg tablet Take 40 mg by mouth daily   Yes Historical Provider, MD   clopidogrel (PLAVIX) 75 mg tablet Take 75 mg by mouth daily   Yes Historical Provider, MD   gabapentin (NEURONTIN) 600 MG tablet Take 1 tablet (600 mg total) by mouth 2 (two) times a day 12/28/21 3/28/22 Yes Adam Hirsch DPM   glimepiride (AMARYL) 2 mg tablet Take 1 tablet (2 mg total) by mouth 2 (two) times a day 1/14/22  Yes Raúl Faye DO   hydrochlorothiazide (HYDRODIURIL) 25 mg tablet Take 25 mg by mouth daily   Yes Historical Provider, MD   losartan (COZAAR) 100 MG tablet Take 1 tablet (100 mg total) by mouth daily 1/13/22  Yes Deshaun Yen MD   metFORMIN (GLUCOPHAGE) 1000 MG tablet Take 1 tablet (1,000 mg total) by mouth 2 (two) times a day with meals 2/2/22  Yes Raúl Faye DO   metoprolol succinate (TOPROL-XL) 50 mg 24 hr tablet Take 1 tablet (50 mg total) by mouth daily 10/27/21  Yes Valentino Morataya MD   Multiple Vitamins-Minerals (MULTIVITAMIN MEN 50+ PO) Take by mouth daily   Yes Historical Provider, MD   Omega-3 Fatty Acids (fish oil) 1,000 mg Take 4,000 mg by mouth daily    Yes Historical Provider, MD   sitaGLIPtin (JANUVIA) 100 mg tablet Take 100 mg by mouth daily   Yes Historical Provider, MD   Empagliflozin (Jardiance) 25 MG TABS Take 1 tablet (25 mg total) by mouth every morning 22   Leidy Aguila DO   canagliflozin (Invokana) 100 mg Take 100 mg by mouth daily before breakfast   Patient not taking: Reported on 2022  Historical Provider, MD       Allergies: Allergies   Allergen Reactions    Lisinopril Rash and Lip Swelling       Social History:     Marital Status: Unknown   Substance Use History:   Social History     Substance and Sexual Activity   Alcohol Use Yes    Alcohol/week: 14 0 standard drinks    Types: 14 Cans of beer per week    Comment: 1 -2 daily     Social History     Tobacco Use   Smoking Status Former Smoker    Types: Cigarettes    Quit date: 80    Years since quittin 1   Smokeless Tobacco Never Used     Social History     Substance and Sexual Activity   Drug Use Never       Family History:    Family History   Problem Relation Age of Onset    Diabetes Mother     Alcohol abuse Father     Mental illness Neg Hx        Physical Exam:     Vitals:   Blood Pressure: 160/70 (22)  Pulse: 66 (22)  Temperature: 99 °F (37 2 °C) (22)  Temp Source: Tympanic (22)  Respirations: 18 (22)  Height: 6' 2" (188 cm) (22)  Weight - Scale: 116 kg (255 lb) (22)  SpO2: 95 % (22)    Physical Exam  Vitals and nursing note reviewed  Constitutional:       Appearance: Normal appearance  HENT:      Head: Normocephalic  Nose: Nose normal    Eyes:      Extraocular Movements: Extraocular movements intact  Cardiovascular:      Rate and Rhythm: Normal rate and regular rhythm  Pulses: Normal pulses  Pulmonary:      Effort: Pulmonary effort is normal       Breath sounds: Normal breath sounds  Abdominal:      General: Abdomen is flat  Bowel sounds are normal  There is no distension  Palpations: Abdomen is soft  Tenderness: There is no abdominal tenderness  Musculoskeletal:         General: No swelling  Normal range of motion  Skin:     General: Skin is warm and dry  Neurological:      General: No focal deficit present  Mental Status: He is alert and oriented to person, place, and time  Psychiatric:         Mood and Affect: Mood normal          Behavior: Behavior normal            Additional Data:     Lab Results: I have personally reviewed pertinent reports  Results from last 7 days   Lab Units 02/16/22  1549   WBC Thousand/uL 7 48   HEMOGLOBIN g/dL 13 1   HEMATOCRIT % 39 2   PLATELETS Thousands/uL 160   NEUTROS PCT % 69     Results from last 7 days   Lab Units 02/16/22  1549   SODIUM mmol/L 135*   POTASSIUM mmol/L 3 9   CHLORIDE mmol/L 99*   CO2 mmol/L 28   BUN mg/dL 28*   CREATININE mg/dL 1 32*   CALCIUM mg/dL 8 6   TOTAL BILIRUBIN mg/dL 0 66   ALK PHOS U/L 76   ALT U/L 46   AST U/L 23     Results from last 7 days   Lab Units 02/16/22  1549   INR  1 05                   Imaging: I have personally reviewed pertinent reports  XR chest 1 view portable   ED Interpretation by Anival Walton MD (18/23 1814)   CM, poor insp      Final Result by Mitzi Garibay MD (02/16 1711)      No acute cardiopulmonary disease  Mild bilateral scar  Workstation performed: [id number]             XR chest 1 view portable   ED Interpretation   CM, poor insp      Final Result      No acute cardiopulmonary disease  Mild bilateral scar                    Workstation performed: [id number]             EKG, Pathology, and Other Studies Reviewed on Admission:   · EKG: NSR, PVC, PAUL    Allscripts / Epic Records Reviewed: Yes     ** Please Note: This note has been constructed using a voice recognition system   **

## 2022-02-17 NOTE — PLAN OF CARE
Problem: Potential for Falls  Goal: Patient will remain free of falls  Description: INTERVENTIONS:  - Educate patient/family on patient safety including physical limitations  - Instruct patient to call for assistance with activity   - Consult OT/PT to assist with strengthening/mobility   - Keep Call bell within reach  - Keep bed low and locked with side rails adjusted as appropriate  - Keep care items and personal belongings within reach  - Initiate and maintain comfort rounds  - Make Fall Risk Sign visible to staff  - Offer Toileting every 2 Hours, in advance of need  - Initiate/Maintain bed alarm  - Obtain necessary fall risk management equipment: yellow socks  - Apply yellow socks and bracelet for high fall risk patients  - Consider moving patient to room near nurses station  Outcome: Progressing     Problem: MOBILITY - ADULT  Goal: Maintain or return to baseline ADL function  Description: INTERVENTIONS:  -  Assess patient's ability to carry out ADLs; assess patient's baseline for ADL function and identify physical deficits which impact ability to perform ADLs (bathing, care of mouth/teeth, toileting, grooming, dressing, etc )  - Assess/evaluate cause of self-care deficits   - Assess range of motion  - Assess patient's mobility; develop plan if impaired  - Assess patient's need for assistive devices and provide as appropriate  - Encourage maximum independence but intervene and supervise when necessary  - Involve family in performance of ADLs  - Assess for home care needs following discharge   - Consider OT consult to assist with ADL evaluation and planning for discharge  - Provide patient education as appropriate  Outcome: Progressing  Goal: Maintains/Returns to pre admission functional level  Description: INTERVENTIONS:  - Perform BMAT or MOVE assessment daily    - Set and communicate daily mobility goal to care team and patient/family/caregiver     - Collaborate with rehabilitation services on mobility goals if consulted  - Perform Range of Motion 4 times a day  - Reposition patient every 2 hours    - Dangle patient 3 times a day  - Stand patient 3 times a day  - Ambulate patient 3 times a day  - Out of bed to chair 3 times a day   - Out of bed for meals 3 times a day  - Out of bed for toileting  - Record patient progress and toleration of activity level   Outcome: Progressing     Problem: PAIN - ADULT  Goal: Verbalizes/displays adequate comfort level or baseline comfort level  Description: Interventions:  - Encourage patient to monitor pain and request assistance  - Assess pain using appropriate pain scale  - Administer analgesics based on type and severity of pain and evaluate response  - Implement non-pharmacological measures as appropriate and evaluate response  - Consider cultural and social influences on pain and pain management  - Notify physician/advanced practitioner if interventions unsuccessful or patient reports new pain  Outcome: Progressing     Problem: INFECTION - ADULT  Goal: Absence or prevention of progression during hospitalization  Description: INTERVENTIONS:  - Assess and monitor for signs and symptoms of infection  - Monitor lab/diagnostic results  - Monitor all insertion sites, i e  indwelling lines, tubes, and drains  - Monitor endotracheal if appropriate and nasal secretions for changes in amount and color  - [place] appropriate cooling/warming therapies per order  - Administer medications as ordered  - Instruct and encourage patient and family to use good hand hygiene technique  - Identify and instruct in appropriate isolation precautions for identified infection/condition  Outcome: Progressing     Problem: SAFETY ADULT  Goal: Patient will remain free of falls  Description: INTERVENTIONS:  - Educate patient/family on patient safety including physical limitations  - Instruct patient to call for assistance with activity   - Consult OT/PT to assist with strengthening/mobility   - Keep Call bell within reach  - Keep bed low and locked with side rails adjusted as appropriate  - Keep care items and personal belongings within reach  - Initiate and maintain comfort rounds  - Make Fall Risk Sign visible to staff  - Offer Toileting every 2 Hours, in advance of need  - Initiate/Maintain bed alarm  - Obtain necessary fall risk management equipment: yellow socks  - Apply yellow socks and bracelet for high fall risk patients  - Consider moving patient to room near nurses station  Outcome: Progressing  Goal: Maintain or return to baseline ADL function  Description: INTERVENTIONS:  -  Assess patient's ability to carry out ADLs; assess patient's baseline for ADL function and identify physical deficits which impact ability to perform ADLs (bathing, care of mouth/teeth, toileting, grooming, dressing, etc )  - Assess/evaluate cause of self-care deficits   - Assess range of motion  - Assess patient's mobility; develop plan if impaired  - Assess patient's need for assistive devices and provide as appropriate  - Encourage maximum independence but intervene and supervise when necessary  - Involve family in performance of ADLs  - Assess for home care needs following discharge   - Consider OT consult to assist with ADL evaluation and planning for discharge  - Provide patient education as appropriate  Outcome: Progressing  Goal: Maintains/Returns to pre admission functional level  Description: INTERVENTIONS:  - Perform BMAT or MOVE assessment daily    - Set and communicate daily mobility goal to care team and patient/family/caregiver  - Collaborate with rehabilitation services on mobility goals if consulted  - Perform Range of Motion 4 times a day  - Reposition patient every 2 hours    - Dangle patient 3 times a day  - Stand patient 3 times a day  - Ambulate patient 3 times a day  - Out of bed to chair 3 times a day   - Out of bed for meals 3 times a day  - Out of bed for toileting  - Record patient progress and toleration of activity level   Outcome: Progressing     Problem: DISCHARGE PLANNING  Goal: Discharge to home or other facility with appropriate resources  Description: INTERVENTIONS:  - Identify barriers to discharge w/patient and caregiver  - Arrange for needed discharge resources and transportation as appropriate  - Identify discharge learning needs (meds, wound care, etc )  - Arrange for interpretive services to assist at discharge as needed  - Refer to Case Management Department for coordinating discharge planning if the patient needs post-hospital services based on physician/advanced practitioner order or complex needs related to functional status, cognitive ability, or social support system  Outcome: Progressing     Problem: Knowledge Deficit  Goal: Patient/family/caregiver demonstrates understanding of disease process, treatment plan, medications, and discharge instructions  Description: Complete learning assessment and assess knowledge base    Interventions:  - Provide teaching at level of understanding  - Provide teaching via preferred learning methods  Outcome: Progressing     Problem: CARDIOVASCULAR - ADULT  Goal: Maintains optimal cardiac output and hemodynamic stability  Description: INTERVENTIONS:  - Monitor I/O, vital signs and rhythm  - Monitor for S/S and trends of decreased cardiac output  - Administer and titrate ordered vasoactive medications to optimize hemodynamic stability  - Assess quality of pulses, skin color and temperature  - Assess for signs of decreased coronary artery perfusion  - Instruct patient to report change in severity of symptoms  Outcome: Progressing  Goal: Absence of cardiac dysrhythmias or at baseline rhythm  Description: INTERVENTIONS:  - Continuous cardiac monitoring, vital signs, obtain 12 lead EKG if ordered  - Administer antiarrhythmic and heart rate control medications as ordered  - Monitor electrolytes and administer replacement therapy as ordered  Outcome: Progressing

## 2022-02-17 NOTE — CONSULTS
Consultation - Cardiology   Richard Roth 76 y o  male MRN: [id number]  Unit/Bed#: [address]36 Encounter: [id number]    Assessment/Plan     Assessment:  1  Chest pain/angina  2  Coronary artery disease with history of CABG x3 and PCI of the distal left circ  3  Diabetes   4  Hypertension   5  Dyslipidemia  6  Obesity      Plan:  Patient has been admitted to the hospital service  1  Will repeat 2D echocardiogram to re-evaluate patient's EF and for any wall abnormality    2  Lexiscan nuclear stress test to evaluate for ischemia    3  Continue Toprol XL 50 mg once day, Cozaar 100 mg once a day and Norvasc 10 mg once a day  4  Continue telemetry    5  Further orders as patient's condition and testing once  History of Present Illness   Physician Requesting Consult: Jony Resendiz DO  Reason for Consult / Principal Problem:  Chest pain similar to previous/angina      HPI: Richard Roth is a 76y o  year old male who presented to the emergency room with complaints of increasing intermittent chest discomfort which is midsternal and radiates to his neck associated with shortness of breath and dizziness while doing physical activity over the past few weeks  Patient and wife recently relocated to the [address] [place] [address] [address] area  He states, they have been in the current house for 8 months, and he has been attempting to do a lot of work around the house  He does note that over the last month or so he has been having increasing chest pain which has been limiting his activity  Patient has a history for coronary artery disease with CABG x3 done at [facility] in 2002 and recent PCI and stenting to the distal left circumflex at [facility] in 2021  He states the discomfort he has been feeling is exactly the discomfort he had prior to needing his most recent stent    His daughter was with him yesterday when he had chest discomfort while doing some housework and noted that he became extremely pale   He was not diaphoretic  High sensitivity troponins were 25, 28 and 30 with a delta of 5  He has been admitted for further evaluation    Other history is significant for diabetes with recent hemoglobin A1c of 8 9, hypertension, dyslipidemia, prostate cancer status post prostatectomy and [facility] for abdominal aortic aneurysm in 2012  There is also history of aorto bi-iliac bypass grafting and patient follows with Dr Percy Eli for surveillance  Echocardiogram performed in May of 2021 at 11 at [facility] demonstrated EF visually estimated at 45-50% and mild global hypokinesis  Inpatient consult to Cardiology  Consult performed by: APOLLO Guardado  Consult ordered by: APOLLO Tabares          Review of Systems   Constitutional: Positive for activity change  Negative for appetite change, diaphoresis and fatigue  HENT: Negative for congestion, ear pain, nosebleeds, sinus pressure and tinnitus  Eyes: Negative  Negative for photophobia and visual disturbance  Respiratory: Positive for chest tightness and shortness of breath  Cardiovascular: Positive for chest pain and leg swelling  Negative for palpitations  Gastrointestinal: Negative for abdominal distention, constipation, diarrhea, nausea and vomiting  Endocrine: Negative  Negative for polydipsia, polyphagia and polyuria  Genitourinary: Negative  Negative for difficulty urinating  Musculoskeletal: Negative  Skin: Negative  Neurological: Positive for dizziness and light-headedness  Negative for syncope and weakness  Hematological: Negative  Psychiatric/Behavioral: Negative          Historical Information   Past Medical History:   Diagnosis Date    CAD (coronary artery disease)     [facility] )     prostate    CHF (congestive heart failure) (HCC)     Diabetes mellitus ([facility] )     Myocardial infarction [facility])      Past Surgical History:   Procedure Laterality Date    ADENOIDECTOMY      CARDIAC SURGERY  2002    3 cardiac bypass then angioplasty 2020    CHOLECYSTECTOMY      CORONARY ARTERY BYPASS GRAFT      PROSTATE SURGERY      TONSILLECTOMY       Social History     Substance and Sexual Activity   Alcohol Use Yes    Alcohol/week: 14 0 standard drinks    Types: 14 Cans of beer per week    Comment: 1 -2 daily     Social History     Substance and Sexual Activity   Drug Use Never     E-Cigarette/Vaping    E-Cigarette Use Never User      E-Cigarette/Vaping Substances    Nicotine No     THC No     CBD No     Flavoring No     Other No     Unknown No      Social History     Tobacco Use   Smoking Status Former Smoker    Types: Cigarettes    Quit date: 80    Years since quittin 1   Smokeless Tobacco Never Used     Family History:   Family History   Problem Relation Age of Onset    Diabetes Mother     Alcohol abuse Father     Mental illness Neg Hx        Meds/Allergies   all current active meds have been reviewed, current meds:   Current Facility-Administered Medications   Medication Dose Route Frequency    amLODIPine (NORVASC) tablet 10 mg  10 mg Oral Daily    aspirin chewable tablet 81 mg  81 mg Oral Daily    atorvastatin (LIPITOR) tablet 40 mg  40 mg Oral Daily With Dinner    clopidogrel (PLAVIX) tablet 75 mg  75 mg Oral Daily    enoxaparin (LOVENOX) subcutaneous injection 40 mg  40 mg Subcutaneous Daily    fish oil capsule 4,000 mg  4,000 mg Oral Daily    gabapentin (NEURONTIN) capsule 600 mg  600 mg Oral BID    insulin lispro (HumaLOG) 100 units/mL subcutaneous injection 2-12 Units  2-12 Units Subcutaneous TID AC    insulin lispro (HumaLOG) 100 units/mL subcutaneous injection 2-12 Units  2-12 Units Subcutaneous HS    insulin lispro (HumaLOG) 100 units/mL subcutaneous injection 2-12 Units  2-12 Units Subcutaneous 0200    losartan (COZAAR) tablet 100 mg  100 mg Oral Daily    metoprolol succinate (TOPROL-XL) 24 hr tablet 50 mg  50 mg Oral Daily    ondansetron (ZOFRAN) injection 4 mg  4 mg Intravenous Q6H PRN    and PTA meds:   Prior to Admission Medications   Prescriptions Last Dose Informant Patient Reported? Taking? Empagliflozin (Jardiance) 25 MG TABS   No No   Sig: Take 1 tablet (25 mg total) by mouth every morning   Multiple Vitamins-Minerals (MULTIVITAMIN MEN 50+ PO) 2/16/2022 at 0900 Self Yes Yes   Sig: Take by mouth daily   Omega-3 Fatty Acids (fish oil) 1,000 mg 2/16/2022 at 0900 Self Yes Yes   Sig: Take 4,000 mg by mouth daily    amLODIPine (NORVASC) 10 mg tablet 2/16/2022 at 0900 Self No Yes   Sig: Take 1 tablet (10 mg total) by mouth daily   aspirin 81 mg chewable tablet 2/16/2022 at 0900 Self Yes Yes   Sig: Chew 81 mg daily   atorvastatin (LIPITOR) 40 mg tablet 2/15/2022 at 2000 Self Yes Yes   Sig: Take 40 mg by mouth daily   clopidogrel (PLAVIX) 75 mg tablet 2/16/2022 at 0900 Self Yes Yes   Sig: Take 75 mg by mouth daily   gabapentin (NEURONTIN) 600 MG tablet 2/16/2022 at 0900 Self No Yes   Sig: Take 1 tablet (600 mg total) by mouth 2 (two) times a day   glimepiride (AMARYL) 2 mg tablet 2/16/2022 at 0900  No Yes   Sig: Take 1 tablet (2 mg total) by mouth 2 (two) times a day   hydrochlorothiazide (HYDRODIURIL) 25 mg tablet 2/16/2022 at 0900 Self Yes Yes   Sig: Take 25 mg by mouth daily   losartan (COZAAR) 100 MG tablet 2/16/2022 at 0900  No Yes   Sig: Take 1 tablet (100 mg total) by mouth daily   metFORMIN (GLUCOPHAGE) 1000 MG tablet 2/16/2022 at 0900  No Yes   Sig: Take 1 tablet (1,000 mg total) by mouth 2 (two) times a day with meals   metoprolol succinate (TOPROL-XL) 50 mg 24 hr tablet 2/16/2022 at 0900 Self No Yes   Sig: Take 1 tablet (50 mg total) by mouth daily   sitaGLIPtin (JANUVIA) 100 mg tablet 2/16/2022 at 0900 Self Yes Yes   Sig: Take 100 mg by mouth daily      Facility-Administered Medications: None     Allergies   Allergen Reactions    Lisinopril Rash and Lip Swelling       Objective   Vitals: Blood pressure 134/69, pulse 64, temperature (!) 97 4 °F (36 3 °C), resp   rate 18, height 6' 2" (1 88 m), weight 115 kg (253 lb 12 oz), SpO2 94 %  Orthostatic Blood Pressures      Most Recent Value   Blood Pressure 134/69 filed at 02/17/2022 0725   Patient Position - Orthostatic VS Lying filed at 02/16/2022 2000          No intake or output data in the 24 hours ending 02/17/22 0843    Invasive Devices  Report    Peripheral Intravenous Line            Peripheral IV 02/16/22 Left Wrist <1 day                Physical Exam  Vitals and nursing note reviewed  Constitutional:       General: He is not in acute distress  Appearance: Normal appearance  He is obese  HENT:      Head: Normocephalic  Right Ear: External ear normal       Left Ear: External ear normal       Nose: Nose normal       Mouth/Throat:      Pharynx: No oropharyngeal exudate or posterior oropharyngeal erythema  Eyes:      General: No scleral icterus  Right eye: No discharge  Left eye: No discharge  Cardiovascular:      Rate and Rhythm: Normal rate and regular rhythm  Pulses: Normal pulses  Heart sounds: Murmur heard  Pulmonary:      Effort: Pulmonary effort is normal  No respiratory distress  Breath sounds: Normal breath sounds  No wheezing, rhonchi or rales  Abdominal:      General: Bowel sounds are normal  There is no distension  Palpations: Abdomen is soft  Musculoskeletal:      Right lower leg: No edema  Left lower leg: No edema  Skin:     General: Skin is warm and dry  Capillary Refill: Capillary refill takes less than 2 seconds  Neurological:      General: No focal deficit present  Mental Status: He is alert and oriented to person, place, and time  Mental status is at baseline  Psychiatric:         Mood and Affect: Mood normal          Lab Results:   I have personally reviewed pertinent lab results      CBC with diff:   Results from last 7 days   Lab Units 02/17/22  0505   WBC Thousand/uL 6 59   RBC Million/uL 4 09   HEMOGLOBIN g/dL 11 8* HEMATOCRIT % 35 2*   MCV fL 86   MCH pg 28 9   MCHC g/dL 33 5   RDW % 13 1   MPV fL 11 2   PLATELETS Thousands/uL 130*     CMP:   Results from last 7 days   Lab Units 02/17/22  0505   SODIUM mmol/L 140   CHLORIDE mmol/L 105   CO2 mmol/L 27   BUN mg/dL 22   CREATININE mg/dL 1 04   CALCIUM mg/dL 8 3   AST U/L 27   ALT U/L 39   ALK PHOS U/L 60   EGFR ml/min/1 73sq m 67     HS Troponin:   0   Lab Value Date/Time    HSTNI0 25 02/16/2022 1549    HSTNI2 28 02/16/2022 1757    HSTNI4 30 02/16/2022 2015     BNP:   Results from last 7 days   Lab Units 02/17/22  0505   POTASSIUM mmol/L 3 7   CHLORIDE mmol/L 105   CO2 mmol/L 27   BUN mg/dL 22   CREATININE mg/dL 1 04   CALCIUM mg/dL 8 3   EGFR ml/min/1 73sq m 67     Coags:   Results from last 7 days   Lab Units 02/16/22  1549   PTT seconds 30   INR  1 05     TSH:     Magnesium:   Results from last 7 days   Lab Units 02/17/22  0505   MAGNESIUM mg/dL 1 9     Lipid Profile:     Imaging: I have personally reviewed pertinent reports      EKG:  Sinus rhythm with occasional PVC, mild LVH and left anterior fascicular block  VTE Prophylaxis: Sequential compression device Tomás King)     Code Status: Level 1 - Full Code  Advance Directive and Living Will:      Power of :    POLST:      Keren Boyd  Cardiology

## 2022-02-17 NOTE — ASSESSMENT & PLAN NOTE
Lab Results   Component Value Date    EGFR 50 02/16/2022    EGFR 61 01/11/2022    EGFR 63 10/08/2021    CREATININE 1 32 (H) 02/16/2022    CREATININE 1 13 01/11/2022    CREATININE 1 12 10/08/2021   Baseline Cr 1 1, slightly elevated at 1 3  Trend

## 2022-02-17 NOTE — UTILIZATION REVIEW
Initial Clinical Review    Observation 2/16/22 @ 1848, converted to inpatient admission 2/17/22 @ 1416 for continued care & tx for chest pain  Admission: Date/Time/Statement:   Admission Orders (From admission, onward)     Ordered        02/17/22 1416  Inpatient Admission  Once            02/16/22 1848  Place in Observation  Once                      Orders Placed This Encounter   Procedures    Inpatient Admission     Standing Status:   Standing     Number of Occurrences:   1     Order Specific Question:   Level of Care     Answer:   Med Surg [16]     Order Specific Question:   Estimated length of stay     Answer:   More than 2 Midnights     Order Specific Question:   Certification     Answer:   I certify that inpatient services are medically necessary for this patient for a duration of greater than two midnights  See H&P and MD Progress Notes for additional information about the patient's course of treatment  ED Arrival Information     Expected Arrival Acuity    - 2/16/2022 15:20 Urgent         Means of arrival Escorted by Service Admission type    Ambulance [facility] Urgent         Arrival complaint    chest pain        Chief Complaint   Patient presents with    Chest Pain     Pt states chest pain developed today during strenuous work about 11:00 today  Slightly dizzy and had a hard time catching his breath  Initial Presentation:   76y o  year old male who presented to the emergency room with complaints of increasing intermittent chest discomfort which is midsternal and radiates to his neck associated with shortness of breath and dizziness while doing physical activity over the past few weeks    More severe symptoms today, turned gray, warm, "collapsed" into chair; resolved upon EMS arrival  Hx coronary artery disease with CABG x3 done at [facility] in 2002 and recent PCI and stenting to the distal left circumflex at [facility] in 2021, diabetes with recent hemoglobin A1c of 8 9, hypertension, dyslipidemia, prostate cancer status post prostatectomy and EVAR for abdominal aortic aneurysm in 2012, aorto bi-iliac bypass grafting  Placed in observation status, cardio consulted  Observation to IP admission 2/17/22:  Remains on telemetry for chest pain POA x weeks  Cardio follow for mgt of care  Per cardio: chest pain/angina, CAD hx CABGx3, PCI of distal L circ  Schedule Lexiscan nuclear stress test to eval for ischemia  Continue Toprol XL, Cozaar & Norvasc  Telemetry monitoring in progress       ED Triage Vitals [02/16/22 1525]   Temperature Pulse Respirations Blood Pressure SpO2   97 5 °F (36 4 °C) 77 18 169/80 98 %      Temp Source Heart Rate Source Patient Position - Orthostatic VS BP Location FiO2 (%)   Tympanic Monitor Lying Left arm --      Pain Score       No Pain          Wt Readings from Last 1 Encounters:   02/17/22 115 kg (253 lb 12 oz)     Additional Vital Signs:   02/17/22 12:28:31 98 1 °F (36 7 °C) 59 18 134/70 91 98 % -- --   02/17/22 0730 -- -- -- -- -- -- None (Room air) --   02/17/22 07:25:07 97 4 °F (36 3 °C) Abnormal  64 18 134/69 91 94 % None (Room air) Lying   02/17/22 05:00:10 -- 68 18 136/68 91 94 % -- --   02/16/22 22:45:31 97 9 °F (36 6 °C) 63 18 158/70 99 95 % -- --   02/16/22 21:10:35 99 °F (37 2 °C) 66 18 160/70 100 95 % -- --   02/16/22 2000 -- 69 -- 171/77 Abnormal  110 95 % None (Room air) Lying   02/16/22 1913 -- 70 18 162/71 -- 97 % None (Room air) Lying     Pertinent Labs/Diagnostic Test Results:   Results from last 7 days   Lab Units 02/16/22  1928   SARS-COV-2  Negative     Results from last 7 days   Lab Units 02/17/22  0505 02/16/22  1549   WBC Thousand/uL 6 59 7 48   HEMOGLOBIN g/dL 11 8* 13 1   HEMATOCRIT % 35 2* 39 2   PLATELETS Thousands/uL 130* 160   NEUTROS ABS Thousands/µL  --  5 21     Results from last 7 days   Lab Units 02/17/22  0505 02/16/22  1549   SODIUM mmol/L 140 135*   POTASSIUM mmol/L 3 7 3 9   CHLORIDE mmol/L 105 99*   CO2 mmol/L 27 28   ANION GAP mmol/L 8 8   BUN mg/dL 22 28*   CREATININE mg/dL 1 04 1 32*   EGFR ml/min/1 73sq m 67 50   CALCIUM mg/dL 8 3 8 6   MAGNESIUM mg/dL 1 9 1 8     Results from last 7 days   Lab Units 02/17/22  0505 02/16/22  1549   AST U/L 27 23   ALT U/L 39 46   ALK PHOS U/L 60 76   TOTAL PROTEIN g/dL 6 8 7 6   ALBUMIN g/dL 3 2* 3 7   TOTAL BILIRUBIN mg/dL 0 53 0 66     Results from last 7 days   Lab Units 02/17/22  1233 02/17/22  0748 02/17/22  0253 02/16/22  2213   POC GLUCOSE mg/dl 335* 224* 262* 289*     Results from last 7 days   Lab Units 02/17/22  0505 02/16/22  1549   GLUCOSE RANDOM mg/dL 208* 439*     Results from last 7 days   Lab Units 02/16/22  2015 02/16/22  1757 02/16/22  1549   HS TNI 0HR ng/L  --   --  25   HS TNI 2HR ng/L  --  28  --    HSTNI D2 ng/L  --  3  --    HS TNI 4HR ng/L 30  --   --    HSTNI D4 ng/L 5  --   --      Results from last 7 days   Lab Units 02/16/22  1549   PROTIME seconds 13 5   INR  1 05   PTT seconds 30     Results from last 7 days   Lab Units 02/16/22  1549   NT-PRO BNP pg/mL 420     Results from last 7 days   Lab Units 02/16/22  1928   INFLUENZA A PCR  Negative   INFLUENZA B PCR  Negative   RSV PCR  Negative     2/16  Cxr=  No acute cardiopulmonary disease  Mild bilateral scar  Ekg=  Sinus rhythm with occasional Premature ventricular complexes  Left anterior fascicular block  Moderate voltage criteria for LVH, may be normal variant    2/17  NM Myocardial Perfusion Spect    Stress Combined Conclusion: Left ventricular perfusion is abnormal  There is mild photon reduction in the anterior wall at stress  Findings are consistent with ischemia  Additional area of infarct involving the mid and apical portion of the inferior wall    Stress Function: Left ventricular function post-stress is abnormal  Global function is mildly reduced  There were no regional wall motion abnormalities during stress  Post-stress ejection fraction is 40 %      Stress ECG: No ST deviation is noted  There were no arrhythmias during recovery    The ECG was negative for ischemia    Perfusion: There is a left ventricular perfusion defect that is small in size with severe reduction in uptake present in the mid to apical inferior location(s) that is fixed  There is a left ventricular perfusion defect that is small to medium in size with mild reduction in uptake present in the mid to apical anterior location(s) that is reversible        ED Treatment:   Medication Administration from 02/16/2022 1520 to 02/16/2022 2054       Date/Time Order Dose Route Action     02/16/2022 1910 enoxaparin (LOVENOX) subcutaneous injection 120 mg 120 mg Subcutaneous Given        Past Medical History:   Diagnosis Date    CAD (coronary artery disease)     Cancer ([place] [address] )     prostate    [facility]estive [facility] failure) [facility]     Diabetes mellitus ([place] [address] )     Myocardial infarction ([place] [address] )      Present on Admission:   Chest pain   Coronary artery disease involving native coronary artery of native heart without angina pectoris   Essential hypertension   Mixed hyperlipidemia   Type 2 diabetes mellitus with diabetic polyneuropathy, without long-term current use of insulin ([facility])    Admitting Diagnosis: Unstable angina ([place] [address] ) [I20 0]  Chest pain [R07 9]  Age/Sex: 76 y o  male  Admission Orders:  Telemetry  accuchecks  Scd/foot pumps  Consult cardio    Scheduled Medications:  amLODIPine, 10 mg, Oral, Daily  aspirin, 81 mg, Oral, Daily  atorvastatin, 40 mg, Oral, Daily With Dinner  clopidogrel, 75 mg, Oral, Daily  enoxaparin, 40 mg, Subcutaneous, Daily  fish oil, 4,000 mg, Oral, Daily  gabapentin, 600 mg, Oral, BID  insulin lispro, 2-12 Units, Subcutaneous, TID AC  insulin lispro, 2-12 Units, Subcutaneous, HS  insulin lispro, 2-12 Units, Subcutaneous, 0200  losartan, 100 mg, Oral, Daily  metoprolol succinate, 50 mg, Oral, Daily    PRN Meds:  ondansetron, 4 mg, Intravenous, Q6H PRN    Network Utilization Review Department  ATTENTION: Please call with any questions or concerns to [phone] and carefully listen to the prompts so that you are directed to the right person  All voicemails are confidential   [facility] all requests for admission clinical reviews, approved or denied determinations and any other requests to dedicated fax number below belonging to the campus where the patient is receiving treatment   List of dedicated fax numbers for the Facilities:  [address] DENIALS (Administrative/Medical Necessity) [phone]   [address] (Maternity/NICU/Pediatrics) [phone]   [address] [place] [address]  [address] [id number] [place] [address] [address] [place] [address] [facility] [place] [address] [phone]

## 2022-02-17 NOTE — ASSESSMENT & PLAN NOTE
Lab Results   Component Value Date    HGBA1C 8 9 (H) 01/11/2022       No results for input(s): POCGLU in the last 72 hours      Blood Sugar Average: Last 72 hrs:  Accuchecks AC/HS with insulin sliding scale  Hold oral meds for now

## 2022-02-17 NOTE — ASSESSMENT & PLAN NOTE
Patient reports midsternal pressure with exertion for about one week  No radiation of the pain  It is associated with some shortness of breath  Today the pain was severe prompting him to come to the ER  No pain at present  FOZIA 5    Case was discussed with cardiology by ER - planned for admit, lovenox x 1     · Admit to Medicine  · Serial EKGs and troponin  · Aspirin, statin  · Cardiology consultation

## 2022-02-17 NOTE — DISCHARGE INSTRUCTIONS
Type 2 Diabetes Management for Adults   WHAT YOU NEED TO KNOW:   Type 2 diabetes is a disease that affects how your body uses glucose (sugar)  Either your body cannot make enough insulin, or it cannot use the insulin correctly  It is important to keep diabetes controlled to prevent damage to your heart, blood vessels, and other organs  DISCHARGE INSTRUCTIONS:   What you can do to manage your blood sugar levels:   · Talk to your care team if you become stressed about diabetes care  Sometimes being able to fit diabetes care into your life can cause increased stress  The stress can cause you not to take care of yourself properly  Your care team can help by offering tips about self-care  Your care team may suggest you talk to a mental health provider  The provider can listen and offer help with self-care issues  · Make healthy food choices  Work with a dietitian to develop a meal plan that works for you and your schedule  A dietitian can help you learn how to eat the right amount of carbohydrates during your meals and snacks  Carbohydrates can raise your blood sugar if you eat too many at one time  Some foods that contain carbohydrates include breads, cereals, rice, pasta, and sweets  · Drink water  Water can help your kidneys function properly  Decrease the amount of drinks with sugar substitutes you have, such as diet sodas  Avoid sugary drinks, such as regular sodas and fruit juice  · Get regular physical activity  Physical activity can help you get to your target blood sugar level goal and manage your weight  Get at least 150 minutes of moderate to vigorous aerobic physical activity each week  Do not miss more than 2 days in a row  Do not sit longer than 30 minutes at a time  Your healthcare provider can help you create an activity plan  The plan can include the best activities for you and can help you build your strength and endurance  · Maintain a healthy weight    Ask your healthcare provider how much you should weigh  Ask him or her to help you create a safe weight loss plan if you are overweight  · Check your blood sugar level as directed and as needed  Ask your healthcare provider what your blood sugar levels should be  Ask him or her to help you create a plan for times to check your level  · Take your diabetes medicine or insulin as directed  You may need diabetes medicine, insulin, or both to help control your blood sugar levels  Your healthcare provider will teach you how and when to take your diabetes medicine or insulin  · Go to all follow-up appointments  Your healthcare provider may need to check your A1c every 3 months  An A1c test shows the average amount of sugar in your blood over the past 2 to 3 months  Your healthcare provider will tell you what your A1c level should be  What you need to know about high blood sugar:  High blood sugar may not cause any symptoms  It may cause you to feel more thirsty than usual or urinate more often than usual  Over time, high blood sugar levels can damage your nerves, blood vessels, tissues, and organs  The following can increase your blood sugar levels:  · Large meals or large amounts of carbohydrates at one time    · Decreased physical activity    · Stress    · Illness     · A lower dose of medicine or insulin, or a late dose    What you need to know about low blood sugar: You can prevent symptoms such as shakiness, dizziness, irritability, or confusion by preventing your blood sugar from going too low  · Treat low blood sugar right away  ? Drink 4 ounces of juice or have 1 tube of glucose gel  ? Check your blood sugar again 10 to 15 minutes later  ? When your blood sugar goes back to normal, eat a meal or snack to prevent another decrease  ·          · Keep glucose gel, raisins, or even hard candy with you at all times to treat low blood sugar       · Your blood sugar can get too low if you take diabetes medicine or insulin and do not eat enough food  · If you use insulin, check your blood sugar before you exercise  ? If your blood sugar is below 100 mg/dL, eat 4 crackers, 2 ounces of raisins, or drink 4 ounces of juice  ? Check your blood sugar every 30 minutes if you exercise more than 1 hour  ? You may need a snack during or after exercise  Other things you can do to manage your diabetes:   · Wear medical alert jewelry or carry a card that says you have diabetes  Your provider can tell you where to get the items  · Be safe when you drive  If you feel like your blood sugar is low while you are driving, pull over and check your blood sugar level  Treat low blood sugar before you start driving again, if needed  Keep snacks such as raisins and crackers in your car  You can also keep glucose gel in your car  · Know the risks if you choose to drink alcohol  Alcohol can cause your blood sugar levels to be low if you use insulin  Alcohol can cause high blood sugar levels and weight gain if you drink too much  Women should limit alcohol to 1 drink a day  Men should limit alcohol to 2 drinks a day  A drink of alcohol is 12 ounces of beer, 5 ounces of wine, or 1½ ounces of liquor  · Do not smoke  Nicotine can damage blood vessels and make it more difficult to manage your diabetes  Do not use e-cigarettes or smokeless tobacco in place of cigarettes or to help you quit  They still contain nicotine  Ask your healthcare provider for information if you currently smoke and need help quitting  · Have screenings for complications of diabetes and other conditions that happen with diabetes  You will need to be screened for kidney problems, high cholesterol, high blood pressure, blood vessel problems, eye problems, and eating disorders  Some screenings may begin right away and some may happen within the first 5 years of diagnosis   You will need to continue screenings through your lifetime  Keep your follow-up appointments with all providers  · Ask about vaccines  You have a higher risk for serious illness if you get the flu, pneumonia, COVID-19, or hepatitis  Ask your healthcare provider if you should get a flu, pneumonia, or hepatitis B vaccine, and when to get the COVID-19 vaccine  Follow up with your healthcare provider as directed: You may need to have blood tests done before your follow-up visit  The test results will show if changes need to be made in your treatment or self-care  Write down your questions so you remember to ask them during your visit  Talk to your provider if you cannot afford your medicine  © Copyright [employer] 2021 Information is for End User's use only and may not be sold, redistributed or otherwise used for commercial purposes  All illustrations and images included in CareNotes® are the copyrighted property of [employer]  or Leticia Gaspar   The above information is an  only  It is not intended as medical advice for individual conditions or treatments  Talk to your doctor, nurse or pharmacist before following any medical regimen to see if it is safe and effective for you  Angina   WHAT YOU NEED TO KNOW:   Angina is pain, pressure, or tightness that is usually felt in your chest  Chest pain may come on when you are stressed or do physical activities, such as walking or exercising  Angina is caused by decreased blood flow and oxygen to your heart  These are often caused by atherosclerosis (hardening of the arteries)  If left untreated, angina may get worse, increase your risk for a heart attack, or become life-threatening  DISCHARGE INSTRUCTIONS:   Call your local emergency number (911 in the [address] [place] [address],[address]) or have someone call if:   · You have any of the following signs of a heart attack:      ?  Squeezing, pressure, or pain in your chest    ? You may  also have any of the following:     § Discomfort or pain in your back, neck, jaw, stomach, or arm    § Shortness of breath    § Nausea or vomiting    § Lightheadedness or a sudden cold sweat    · You have chest pain that does not go away after you take medicine as directed  · You lose feeling in your face, arms, or legs, or you suddenly feel weak  Seek care immediately if:   · Your angina is happening more often, lasting longer, or causing worse pain  Call your doctor or cardiologist if:   · You are dizzy or nauseated after you take your medicine  · You have shortness of breath at rest     · You have new or worse swelling in your feet or ankles  · You have questions or concerns about your condition or care  Medicines:  Do not take certain medicines without asking your healthcare provider first  These include NSAIDs, herbal or vitamin supplements, or hormones (estrogen or progestin)  You may need any of the following:  · Antiplatelets , such as aspirin, help prevent blood clots  Take your antiplatelet medicine exactly as directed  These medicines make it more likely for you to bleed or bruise  If you are told to take aspirin, do not take acetaminophen or ibuprofen instead  · Blood thinners  help prevent blood clots  Clots can cause strokes, heart attacks, and death  The following are general safety guidelines to follow while you are taking a blood thinner:    ? Watch for bleeding and bruising while you take blood thinners  Watch for bleeding from your gums or nose  Watch for blood in your urine and bowel movements  Use a soft washcloth on your skin, and a soft toothbrush to brush your teeth  This can keep your skin and gums from bleeding  If you shave, use an electric shaver  Do not play contact sports  ? Tell your dentist and other healthcare providers that you take a blood thinner  Wear a bracelet or necklace that says you take this medicine  ? Do not start or stop any other medicines unless your healthcare provider tells you to   Many medicines cannot be used with blood thinners  ? Take your blood thinner exactly as prescribed by your healthcare provider  Do not skip does or take less than prescribed  Tell your provider right away if you forget to take your blood thinner, or if you take too much  ? Warfarin  is a blood thinner that you may need to take  The following are things you should be aware of if you take warfarin:     § Foods and medicines can affect the amount of warfarin in your blood  Do not make major changes to your diet while you take warfarin  Warfarin works best when you eat about the same amount of vitamin K every day  Vitamin K is found in green leafy vegetables and certain other foods  Ask for more information about what to eat when you are taking warfarin  § You will need to see your healthcare provider for follow-up visits when you are on warfarin  You will need regular blood tests  These tests are used to decide how much medicine you need  · Other medicines  may be given to open the arteries to your heart, slow your heartbeat, or decrease your blood pressure or cholesterol  · Take your medicine as directed  Contact your healthcare provider if you think your medicine is not helping or if you have side effects  Tell him or her if you are allergic to any medicine  Keep a list of the medicines, vitamins, and herbs you take  Include the amounts, and when and why you take them  Bring the list or the pill bottles to follow-up visits  Carry your medicine list with you in case of an emergency  Cardiac rehabilitation:  Your healthcare provider may recommend that you attend cardiac rehabilitation (rehab)  This is a program run by specialists who will help you safely strengthen your heart and prevent more heart disease  The plan includes exercise, relaxation, stress management, and heart-healthy nutrition  Healthcare providers will also check to make sure any medicines you are taking are working   The plan may also include instructions for when you can drive, return to work, and do other normal daily activities  Manage angina:   · Keep a record or a calendar with details about your chest pain  Every time you have pain or symptoms, record what the pain is like, how long it lasts, and how severe it is  Also record what you are doing when the pain starts, and what makes it go away  Bring this with you every time you see your healthcare provider  · Avoid activities that cause angina  Pay attention to your symptoms and find out what seems to make your angina worse  · Manage health conditions that can cause angina  Healthcare providers can help you manage a chronic health condition such as diabetes  Ask your provider for more information  · Ask about vaccines you may need  Vaccines help prevent infections that can put more stress on your heart  Get the influenza (flu) vaccine every year as soon as recommended, usually in September or October  You may also need a pneumococcal vaccine to prevent pneumonia  The vaccine is usually given every 5 years, starting at age 72  Your healthcare provider can tell you if should get other vaccines, and when to get them  Healthy living tips:   · Do not smoke  Nicotine and other chemicals in cigarettes and cigars can cause heart and lung damage  Ask your healthcare provider for information if you currently smoke and need help to quit  E-cigarettes or smokeless tobacco still contain nicotine  Talk to your healthcare provider before you use these products  · Maintain a healthy weight  When you weigh more than is healthy for you, your heart must work harder  You are at higher risk for serious health problems if you are overweight  Ask your healthcare provider what a healthy weight is for you  Ask him or her to help you create a weight loss plan if you are overweight  · Ask about physical activity  Physical activity, such as exercise, can help strengthen your heart   Your healthcare provider can help you create a safe physical activity plan  · Choose a variety of heart-healthy foods as often as possible  Include fresh, frozen, or canned fruits and vegetables  Also include low-fat dairy products, fish, chicken (without skin), and lean meats  Your healthcare provider or a dietitian can help you create meal plans  · Lower your sodium (salt) intake  Limit foods that are high in sodium, such as canned foods, salty snacks, and cold cuts  If you add salt when you cook food, do not add more at the table  Choose low-sodium canned foods as much as possible  · Eat foods high in omega-3 fatty acids  Fish that are high in omega-3 fats include salmon, tuna, and herring  Other sources include broccoli, walnuts, and eggs  Follow up with your healthcare provider as directed:  Write down your questions so you remember to ask them during your visits  © Copyright [employer] 2021 Information is for End User's use only and may not be sold, redistributed or otherwise used for commercial purposes  All illustrations and images included in CareNotes® are the copyrighted property of [employer] M , Inc  or [facility] Angel Gaspar   The above information is an  only  It is not intended as medical advice for individual conditions or treatments  Talk to your doctor, nurse or pharmacist before following any medical regimen to see if it is safe and effective for you

## 2022-02-17 NOTE — PLAN OF CARE
Problem: Potential for Falls  Goal: Patient will remain free of falls  Description: INTERVENTIONS:  - Educate patient/family on patient safety including physical limitations  - Instruct patient to call for assistance with activity   - Consult OT/PT to assist with strengthening/mobility   - Keep Call bell within reach  - Keep bed low and locked with side rails adjusted as appropriate  - Keep care items and personal belongings within reach  - Initiate and maintain comfort rounds  - Make Fall Risk Sign visible to staff  - Offer Toileting every 2 Hours, in advance of need  - Initiate/Maintain bed alarm  - Obtain necessary fall risk management equipment: n/a  - Apply yellow socks and bracelet for high fall risk patients  - Consider moving patient to room near nurses station  Outcome: Progressing     Problem: Knowledge Deficit  Goal: Patient/family/caregiver demonstrates understanding of disease process, treatment plan, medications, and discharge instructions  Description: Complete learning assessment and assess knowledge base    Interventions:  - Provide teaching at level of understanding  - Provide teaching via preferred learning methods  Outcome: Progressing     Problem: CARDIOVASCULAR - ADULT  Goal: Maintains optimal cardiac output and hemodynamic stability  Description: INTERVENTIONS:  - Monitor I/O, vital signs and rhythm  - Monitor for S/S and trends of decreased cardiac output  - Administer and titrate ordered vasoactive medications to optimize hemodynamic stability  - Assess quality of pulses, skin color and temperature  - Assess for signs of decreased coronary artery perfusion  - Instruct patient to report change in severity of symptoms  Outcome: Progressing

## 2022-02-17 NOTE — PLAN OF CARE
Problem: Potential for Falls  Goal: Patient will remain free of falls  Description: INTERVENTIONS:  - Educate patient/family on patient safety including physical limitations  - Instruct patient to call for assistance with activity   - Consult OT/PT to assist with strengthening/mobility   - Keep Call bell within reach  - Keep bed low and locked with side rails adjusted as appropriate  - Keep care items and personal belongings within reach  - Initiate and maintain comfort rounds  - Make Fall Risk Sign visible to staff  - Offer Toileting every 2 Hours, in advance of need  - Initiate/Maintain bed alarm  - Obtain necessary fall risk management equipment: n/a  - Apply yellow socks and bracelet for high fall risk patients  - Consider moving patient to room near nurses station  2/17/2022 1745 by Roxy Cat RN  Outcome: Adequate for Discharge  2/17/2022 1350 by Roxy Cat RN  Outcome: Progressing     Problem: MOBILITY - ADULT  Goal: Maintain or return to baseline ADL function  Description: INTERVENTIONS:  -  Assess patient's ability to carry out ADLs; assess patient's baseline for ADL function and identify physical deficits which impact ability to perform ADLs (bathing, care of mouth/teeth, toileting, grooming, dressing, etc )  - Assess/evaluate cause of self-care deficits   - Assess range of motion  - Assess patient's mobility; develop plan if impaired  - Assess patient's need for assistive devices and provide as appropriate  - Encourage maximum independence but intervene and supervise when necessary  - Involve family in performance of ADLs  - Assess for home care needs following discharge   - Consider OT consult to assist with ADL evaluation and planning for discharge  - Provide patient education as appropriate  2/17/2022 1745 by Roxy Cat RN  Outcome: Adequate for Discharge  2/17/2022 1350 by Roxy Cat RN  Outcome: Progressing  Goal: Maintains/Returns to pre admission functional level  Description: INTERVENTIONS:  - Perform BMAT or MOVE assessment daily    - Set and communicate daily mobility goal to care team and patient/family/caregiver  - Collaborate with rehabilitation services on mobility goals if consulted  - Perform Range of Motion 3 times a day  - Reposition patient every 3 hours    - Dangle patient 3 times a day  - Stand patient 3 times a day  - Ambulate patient 3 times a day  - Out of bed to chair 3 times a day   - Out of bed for meals 3 times a day  - Out of bed for toileting  - Record patient progress and toleration of activity level   2/17/2022 1745 by Rosa Harden RN  Outcome: Adequate for Discharge  2/17/2022 1350 by Rosa Harden RN  Outcome: Progressing     Problem: PAIN - ADULT  Goal: Verbalizes/displays adequate comfort level or baseline comfort level  Description: Interventions:  - Encourage patient to monitor pain and request assistance  - Assess pain using appropriate pain scale  - Administer analgesics based on type and severity of pain and evaluate response  - Implement non-pharmacological measures as appropriate and evaluate response  - Consider cultural and social influences on pain and pain management  - Notify physician/advanced practitioner if interventions unsuccessful or patient reports new pain  2/17/2022 1745 by Rosa Harden RN  Outcome: Adequate for Discharge  2/17/2022 1350 by Rosa Harden RN  Outcome: Progressing     Problem: INFECTION - ADULT  Goal: Absence or prevention of progression during hospitalization  Description: INTERVENTIONS:  - Assess and monitor for signs and symptoms of infection  - Monitor lab/diagnostic results  - Monitor all insertion sites, i e  indwelling lines, tubes, and drains  - Monitor endotracheal if appropriate and nasal secretions for changes in amount and color  - [place] appropriate cooling/warming therapies per order  - Administer medications as ordered  - Instruct and encourage patient and family to use good hand hygiene technique  - Identify and instruct in appropriate isolation precautions for identified infection/condition  2/17/2022 1745 by Idalmis Salcedo RN  Outcome: Adequate for Discharge  2/17/2022 1350 by Idalmis Salcedo RN  Outcome: Progressing     Problem: SAFETY ADULT  Goal: Patient will remain free of falls  Description: INTERVENTIONS:  - Educate patient/family on patient safety including physical limitations  - Instruct patient to call for assistance with activity   - Consult OT/PT to assist with strengthening/mobility   - Keep Call bell within reach  - Keep bed low and locked with side rails adjusted as appropriate  - Keep care items and personal belongings within reach  - Initiate and maintain comfort rounds  - Make Fall Risk Sign visible to staff  - Offer Toileting every 3 Hours, in advance of need  - Initiate/Maintain bed alarm  - Obtain necessary fall risk management equipment: n/a  - Apply yellow socks and bracelet for high fall risk patients  - Consider moving patient to room near nurses station  2/17/2022 1745 by Idalmis Salcedo RN  Outcome: Adequate for Discharge  2/17/2022 1350 by Idalmis Salcedo RN  Outcome: Progressing  Goal: Maintain or return to baseline ADL function  Description: INTERVENTIONS:  -  Assess patient's ability to carry out ADLs; assess patient's baseline for ADL function and identify physical deficits which impact ability to perform ADLs (bathing, care of mouth/teeth, toileting, grooming, dressing, etc )  - Assess/evaluate cause of self-care deficits   - Assess range of motion  - Assess patient's mobility; develop plan if impaired  - Assess patient's need for assistive devices and provide as appropriate  - Encourage maximum independence but intervene and supervise when necessary  - Involve family in performance of ADLs  - Assess for home care needs following discharge   - Consider OT consult to assist with ADL evaluation and planning for discharge  - Provide patient education as appropriate  2/17/2022 1745 by Trisha Prasad RN  Outcome: Adequate for Discharge  2/17/2022 1350 by Trisha Prasad RN  Outcome: Progressing  Goal: Maintains/Returns to pre admission functional level  Description: INTERVENTIONS:  - Perform BMAT or MOVE assessment daily    - Set and communicate daily mobility goal to care team and patient/family/caregiver  - Collaborate with rehabilitation services on mobility goals if consulted  - Perform Range of Motion 3 times a day  - Reposition patient every 3 hours  - Dangle patient  times a day  - Stand patient 3 times a day  - Ambulate patient 3 times a day  - Out of bed to chair 3 times a day   - Out of bed for meals 3 times a day  - Out of bed for toileting  - Record patient progress and toleration of activity level   2/17/2022 1745 by Trisha Prasad RN  Outcome: Adequate for Discharge  2/17/2022 1350 by Trisha Prasad RN  Outcome: Progressing     Problem: DISCHARGE PLANNING  Goal: Discharge to home or other facility with appropriate resources  Description: INTERVENTIONS:  - Identify barriers to discharge w/patient and caregiver  - Arrange for needed discharge resources and transportation as appropriate  - Identify discharge learning needs (meds, wound care, etc )  - Arrange for interpretive services to assist at discharge as needed  - Refer to Case Management Department for coordinating discharge planning if the patient needs post-hospital services based on physician/advanced practitioner order or complex needs related to functional status, cognitive ability, or social support system  2/17/2022 1745 by Trisha Prasad RN  Outcome: Adequate for Discharge  2/17/2022 1350 by Trisha Prasad RN  Outcome: Progressing     Problem: Knowledge Deficit  Goal: Patient/family/caregiver demonstrates understanding of disease process, treatment plan, medications, and discharge instructions  Description: Complete learning assessment and assess knowledge base    Interventions:  - Provide teaching at level of understanding  - Provide teaching via preferred learning methods  2/17/2022 1745 by Carolina Murillo RN  Outcome: Adequate for Discharge  2/17/2022 1350 by Carolina Murillo RN  Outcome: Progressing     Problem: CARDIOVASCULAR - ADULT  Goal: Maintains optimal cardiac output and hemodynamic stability  Description: INTERVENTIONS:  - Monitor I/O, vital signs and rhythm  - Monitor for S/S and trends of decreased cardiac output  - Administer and titrate ordered vasoactive medications to optimize hemodynamic stability  - Assess quality of pulses, skin color and temperature  - Assess for signs of decreased coronary artery perfusion  - Instruct patient to report change in severity of symptoms  2/17/2022 1745 by Carolina Murillo RN  Outcome: Adequate for Discharge  2/17/2022 1350 by Carolina Murillo RN  Outcome: Progressing  Goal: Absence of cardiac dysrhythmias or at baseline rhythm  Description: INTERVENTIONS:  - Continuous cardiac monitoring, vital signs, obtain 12 lead EKG if ordered  - Administer antiarrhythmic and heart rate control medications as ordered  - Monitor electrolytes and administer replacement therapy as ordered  2/17/2022 1745 by Carolina Murillo RN  Outcome: Adequate for Discharge  2/17/2022 1350 by Carolina Murillo RN  Outcome: Progressing

## 2022-02-18 ENCOUNTER — TRANSITIONAL CARE MANAGEMENT (OUTPATIENT)
Dept: FAMILY MEDICINE CLINIC | Facility: CLINIC | Age: 77
End: 2022-02-18

## 2022-02-18 NOTE — DISCHARGE SUMMARY
Joya 43  Discharge- Massimo Siddiqi 1945, 76 y o  male MRN: [id number]  Unit/Bed#: 4 North 707-39 Encounter: [id number]  Primary Care Provider: Ajay Vogel DO   Date and time admitted to hospital: 2/16/2022  3:24 PM    No new Assessment & Plan notes have been filed under this hospital service since the last note was generated  Service: Hospitalist      Medical Problems             Resolved Problems  Date Reviewed: 2/16/2022    None              Discharging Physician / Practitioner: Smith Dunne DO  PCP: Ajay Vogel DO  Admission Date:   Admission Orders (From admission, onward)     Ordered        02/17/22 1416  Inpatient Admission  Once            02/16/22 1848  Place in Observation  Once                      Discharge Date: 02/17/22    Consultations During Hospital Stay:  · Cardiology     Procedures Performed:   · Stress test     Significant Findings / Test Results:   Stress test: There is a left ventricular perfusion defect that is small in size with severe reduction in uptake present in the mid to apical inferior location(s) that is fixed  There is a left ventricular perfusion defect that is small to medium in size with mild reduction in uptake present in the mid to apical anterior location(s) that is reversible  Incidental Findings:   · None    Test Results Pending at Discharge (will require follow up): · None     Outpatient Tests Requested:  · None    Complications:  None    Reason for Admission: Chest pain    Hospital Course:   Massimo Siddiqi is a 76 y o  male patient who originally presented to the hospital on 2/16/2022 due to chest pain  Patient was evaluated by Cardiology and went for stress test which showed mild anterior ischemia which may be artifact however no large area of ischemia or infarct  Recommendation was made to start patient on Imdur  Patient was optimized for discharge and outpatient Cardiology followup      Please see above list of diagnoses and related plan for additional information  Condition at Discharge: fair    Discharge Day Visit / Exam:   Subjective:  Patient had stress test today   Vitals: Blood Pressure: 158/71 (02/17/22 1522)  Pulse: 58 (02/17/22 1522)  Temperature: (!) 97 °F (36 1 °C) (02/17/22 1522)  Temp Source: Oral (02/17/22 0725)  Respirations: 18 (02/17/22 1522)  Height: 6' 2" (188 cm) (02/16/22 1525)  Weight - Scale: 115 kg (253 lb 12 oz) (02/17/22 0549)  SpO2: 97 % (02/17/22 1522)  Exam:   Physical Exam  HENT:      Head: Normocephalic and atraumatic  Mouth/Throat:      Mouth: Mucous membranes are moist    Eyes:      Extraocular Movements: Extraocular movements intact  Cardiovascular:      Rate and Rhythm: Normal rate and regular rhythm  Pulmonary:      Effort: Pulmonary effort is normal       Breath sounds: Normal breath sounds  Abdominal:      General: Abdomen is flat  Palpations: Abdomen is soft  Tenderness: There is no abdominal tenderness  Skin:     General: Skin is warm and dry  Neurological:      Mental Status: He is alert  Discharge instructions/Information to patient and family:   See after visit summary for information provided to patient and family  Provisions for Follow-Up Care:  See after visit summary for information related to follow-up care and any pertinent home health orders  Disposition:   Home    Planned Readmission: none     Discharge Statement:  I spent greater than 30 minutes discharging the patient  This time was spent on the day of discharge  I had direct contact with the patient on the day of discharge  Greater than 50% of the total time was spent examining patient, answering all patient questions, arranging and discussing plan of care with patient as well as directly providing post-discharge instructions  Additional time then spent on discharge activities      Discharge Medications:  See after visit summary for reconciled discharge medications provided to patient and/or family        **Please Note: This note may have been constructed using a voice recognition system**

## 2022-02-18 NOTE — UTILIZATION REVIEW
Notification of Discharge   This is a Notification of Discharge from our facility [address]  Please be advised that this patient has been discharge from our facility  Below you will find the admission and discharge date and time including the patients disposition  UTILIZATION REVIEW CONTACT:  Genna Valladares  Utilization   Network Utilization Review Department  Phone: [phone] x carefully listen to the prompts  All voicemails are confidential   Email: [email or web address]     PHYSICIAN ADVISORY SERVICES:  FOR EPMT-DC-VVCL REVIEW - MEDICAL NECESSITY DENIAL  Phone: [phone]  Fax: [phone]  Email: [email or web address]     PRESENTATION DATE: 2/16/2022  3:24 PM  OBERVATION ADMISSION DATE:   INPATIENT ADMISSION DATE: 2/17/22  2:17 PM   DISCHARGE DATE: 2/17/2022  5:47 PM  DISPOSITION: Home/Self Care Home/Self Care      IMPORTANT INFORMATION:  Send all requests for admission clinical reviews, approved or denied determinations and any other requests to dedicated fax number below belonging to the campus where the patient is receiving treatment   List of dedicated fax numbers:  [address] [place] [address] DENIALS (Administrative/Medical Necessity) [phone]   [address]  (Maternity/NICU/Pediatrics) [phone]   [facility] [phone]   [address] [facility] [phone]   [address] [place] [address] [phone]   [address] [place] [address] [address] [place] [address],[address] [phone]   [facility]  [phone]   [address] [place] [address]  [address] [facility] [address] [facility] [phone]

## 2022-02-21 ENCOUNTER — RA CDI HCC (OUTPATIENT)
Dept: OTHER | Facility: HOSPITAL | Age: 77
End: 2022-02-21

## 2022-02-21 NOTE — PROGRESS NOTES
[facility] [place] [facility]  coding opportunities       I70 209 and E11 51     Chart Reviewed * (Number of) In[place] suggestions sent to Provider: 2                  Patients insurance company: [address]  (Medicare Advantage and Commercial)

## 2022-02-22 ENCOUNTER — OFFICE VISIT (OUTPATIENT)
Dept: FAMILY MEDICINE CLINIC | Facility: CLINIC | Age: 77
End: 2022-02-22
Payer: COMMERCIAL

## 2022-02-22 VITALS
SYSTOLIC BLOOD PRESSURE: 130 MMHG | TEMPERATURE: 97.2 F | HEART RATE: 57 BPM | DIASTOLIC BLOOD PRESSURE: 80 MMHG | BODY MASS INDEX: 33.24 KG/M2 | WEIGHT: 259 LBS | RESPIRATION RATE: 20 BRPM | OXYGEN SATURATION: 96 % | HEIGHT: 74 IN

## 2022-02-22 DIAGNOSIS — I25.10 CORONARY ARTERY DISEASE INVOLVING NATIVE CORONARY ARTERY OF NATIVE HEART WITHOUT ANGINA PECTORIS: Primary | ICD-10-CM

## 2022-02-22 DIAGNOSIS — E11.42 TYPE 2 DIABETES MELLITUS WITH DIABETIC POLYNEUROPATHY, WITHOUT LONG-TERM CURRENT USE OF INSULIN (HCC): ICD-10-CM

## 2022-02-22 DIAGNOSIS — E78.2 MIXED HYPERLIPIDEMIA: ICD-10-CM

## 2022-02-22 DIAGNOSIS — Z95.1 S/P CABG X 3: ICD-10-CM

## 2022-02-22 DIAGNOSIS — Z95.820 S/P ANGIOPLASTY WITH STENT: ICD-10-CM

## 2022-02-22 DIAGNOSIS — R07.9 CHEST PAIN, UNSPECIFIED TYPE: ICD-10-CM

## 2022-02-22 DIAGNOSIS — I10 ESSENTIAL HYPERTENSION: ICD-10-CM

## 2022-02-22 PROCEDURE — 99496 TRANSJ CARE MGMT HIGH F2F 7D: CPT | Performed by: FAMILY MEDICINE

## 2022-02-22 PROCEDURE — 1111F DSCHRG MED/CURRENT MED MERGE: CPT | Performed by: FAMILY MEDICINE

## 2022-02-22 RX ORDER — BLOOD SUGAR DIAGNOSTIC
STRIP MISCELLANEOUS
Qty: 200 EACH | Refills: 3 | Status: SHIPPED | OUTPATIENT
Start: 2022-02-22

## 2022-02-22 RX ORDER — LANCETS 33 GAUGE
EACH MISCELLANEOUS
Qty: 200 EACH | Refills: 3 | Status: SHIPPED | OUTPATIENT
Start: 2022-02-22

## 2022-02-22 RX ORDER — BLOOD-GLUCOSE METER
KIT MISCELLANEOUS
Qty: 1 KIT | Refills: 0 | Status: SHIPPED | OUTPATIENT
Start: 2022-02-22

## 2022-02-22 NOTE — PROGRESS NOTES
Assessment/Plan:    1  Coronary artery disease involving native coronary artery of native heart without angina pectoris    2  Essential hypertension    3  Type 2 diabetes mellitus with diabetic polyneuropathy, without long-term current use of insulin ([facility])  -     Blood Glucose Monitoring Suppl (OneTouch Verio Reflect) w/Device KIT; Check blood sugars twice daily  Please substitute with appropriate alternative as covered by patient's insurance  Dx: E11 65  -     glucose blood (OneTouch Verio) test strip; Check blood sugars twice daily  Please substitute with appropriate alternative as covered by patient's insurance  Dx: E11 65  -     OneTouch Delica Lancets 34C MISC; Check blood sugars twice daily  Please substitute with appropriate alternative as covered by patient's insurance  Dx: E11 65    4  Mixed hyperlipidemia    5  S/P angioplasty with stent    6  S/P CABG x 3    7  Chest pain, unspecified type    pt advised to get the Jardiance on board - has not started yest  He will do sugar logs - suspect we will be starting insulin going forward        There are no Patient Instructions on file for this visit  Return for Next scheduled follow up  Subjective:      Patient ID: Shivani Brand is a 76 y o  male  Chief Complaint   Patient presents with    Transition of Care Management     [facility] unstable angina  [id number]       Pt states he was doing some bathroom remodeling and started having chest pain  Pt states it was actually going on for a bit  Went to ED - yhad stress test - ischemia was seen  Pt was started on Imdur  5 days out    Has not chest pain since and he has been "doing stuff"  No nausea no vomiting no left sided jaw or shoulder pain  Pt has follow up with cardio in march    Pt has not started the Jardiance - due to being worried about bleeding issues    Nurses TCM note appreciated      The following portions of the patient's history were reviewed and updated as appropriate: allergies, current medications, past family history, past medical history, past social history, past surgical history and problem list     Review of Systems   Constitutional: Negative for activity change, appetite change, chills, diaphoresis, fatigue, fever and unexpected weight change  HENT: Negative for congestion, dental problem, ear pain, mouth sores, sinus pressure, sinus pain, sore throat and trouble swallowing  Eyes: Negative for photophobia, discharge and itching  Respiratory: Negative for apnea, chest tightness and shortness of breath  Cardiovascular: Negative for chest pain, palpitations and leg swelling  Gastrointestinal: Negative for abdominal distention, abdominal pain, blood in stool, nausea and vomiting  Endocrine: Negative for cold intolerance, heat intolerance, polydipsia, polyphagia and polyuria  Genitourinary: Negative for difficulty urinating  Musculoskeletal: Negative for arthralgias  Skin: Negative for color change and wound  Neurological: Negative for dizziness, syncope, speech difficulty and headaches  Hematological: Negative for adenopathy  Psychiatric/Behavioral: Negative for agitation and behavioral problems           Current Outpatient Medications   Medication Sig Dispense Refill    amLODIPine (NORVASC) 10 mg tablet Take 1 tablet (10 mg total) by mouth daily 90 tablet 1    aspirin 81 mg chewable tablet Chew 81 mg daily      atorvastatin (LIPITOR) 40 mg tablet Take 40 mg by mouth daily      clopidogrel (PLAVIX) 75 mg tablet Take 75 mg by mouth daily      Empagliflozin (Jardiance) 25 MG TABS Take 1 tablet (25 mg total) by mouth every morning (Patient taking differently: Take 25 mg by mouth every morning Not taking but will discuss with Dr Ham Boucher ) 90 tablet 1    gabapentin (NEURONTIN) 600 MG tablet Take 1 tablet (600 mg total) by mouth 2 (two) times a day 180 tablet 0    glimepiride (AMARYL) 2 mg tablet Take 1 tablet (2 mg total) by mouth 2 (two) times a day      hydrochlorothiazide (HYDRODIURIL) 25 mg tablet Take 25 mg by mouth daily      isosorbide mononitrate (IMDUR) 30 mg 24 hr tablet Take 1 tablet (30 mg total) by mouth daily 30 tablet 0    losartan (COZAAR) 100 MG tablet Take 1 tablet (100 mg total) by mouth daily 90 tablet 2    metFORMIN (GLUCOPHAGE) 1000 MG tablet Take 1 tablet (1,000 mg total) by mouth 2 (two) times a day with meals 180 tablet 0    metoprolol succinate (TOPROL-XL) 50 mg 24 hr tablet Take 1 tablet (50 mg total) by mouth daily 90 tablet 3    Multiple Vitamins-Minerals (MULTIVITAMIN MEN 50+ PO) Take by mouth daily      Omega-3 Fatty Acids (fish oil) 1,000 mg Take 4,000 mg by mouth daily       sitaGLIPtin (JANUVIA) 100 mg tablet Take 100 mg by mouth daily      Blood Glucose Monitoring Suppl (OneTouch Verio Reflect) w/Device KIT Check blood sugars twice daily  Please substitute with appropriate alternative as covered by patient's insurance  Dx: E11 65 1 kit 0    glucose blood (OneTouch Verio) test strip Check blood sugars twice daily  Please substitute with appropriate alternative as covered by patient's insurance  Dx: E11 65 200 each 3    OneTouch Delica Lancets 05N MISC Check blood sugars twice daily  Please substitute with appropriate alternative as covered by patient's insurance  Dx: E11 65 200 each 3     No current facility-administered medications for this visit  Objective:    /80   Pulse 57   Temp (!) 97 2 °F (36 2 °C)   Resp 20   Ht 6' 2" (1 88 m)   Wt 117 kg (259 lb)   SpO2 96%   BMI 33 25 kg/m²        Physical Exam  Vitals and nursing note reviewed  Constitutional:       General: He is not in acute distress  Appearance: He is well-developed  He is not diaphoretic  HENT:      Head: Normocephalic and atraumatic  Right Ear: External ear normal       Left Ear: External ear normal       Nose: Nose normal       Mouth/Throat:      Pharynx: No oropharyngeal exudate  Eyes:      General: No scleral icterus  Right eye: No discharge  Left eye: No discharge  Pupils: Pupils are equal, round, and reactive to light  Neck:      Thyroid: No thyromegaly  Cardiovascular:      Rate and Rhythm: Normal rate  Heart sounds: Normal heart sounds  No murmur heard  Pulmonary:      Effort: Pulmonary effort is normal  No respiratory distress  Breath sounds: Normal breath sounds  No wheezing  Abdominal:      General: Bowel sounds are normal  There is no distension  Palpations: Abdomen is soft  There is no mass  Tenderness: There is no abdominal tenderness  There is no guarding or rebound  Musculoskeletal:         General: Normal range of motion  Skin:     General: Skin is warm and dry  Findings: No erythema or rash  Neurological:      Mental Status: He is alert  Coordination: Coordination normal       Deep Tendon Reflexes: Reflexes normal    Psychiatric:         Behavior: Behavior normal          TCM Call (since 1/22/2022)     Date and time call was made  2/18/2022  9:49 AM    Hospital care reviewed  Records reviewed        Patient was hospitialized at  [address] [place] [address]        Date of Admission  02/16/22    Date of discharge  02/17/22    Diagnosis  Chest Pain    Disposition  Home    Were the patients medications reviewed and updated  Yes    Current Symptoms  None      TCM Call (since 1/22/2022)     Post hospital issues  None    Should patient be enrolled in anticoag monitoring? No    Scheduled for follow up?   Yes    Patients specialists  Cardiologist    Cardiologist name  Dr Robert Sanchez     Did you obtain your prescribed medications  Yes    Do you need help managing your prescriptions or medications  No    Is transportation to your appointment needed  No    I have advised the patient to call PCP with any new or worsening symptoms  [facility] [address] [place] [address] or [facility] other    Support System  Family    The type of support provided  Physical; Emotional    Do you have social support  Yes, as much as I need    Are you recieving any outpatient services  No    Are you recieving home care services  No    Have you fallen in the last 12 months  No    Interperter language line needed  No    Counseling  Patient    Counseling topics  Activities of daily living; Importance of RX compliance; patient and family education; instructions for management; Risk factor reduction    Comments  Juanpablo Garcia states that he is doing fine  He denies any complaints at this time   He knows to go to the ER if any chest pain or dypsnea, etc and he will keep a blood sugar diary to bring to his appt with Dr Ruff, DO

## 2022-02-24 ENCOUNTER — TELEPHONE (OUTPATIENT)
Dept: FAMILY MEDICINE CLINIC | Facility: CLINIC | Age: 77
End: 2022-02-24

## 2022-02-24 NOTE — TELEPHONE ENCOUNTER
Called pharmacist and she is going to reach out to patient  No further action needed at this time    Franchesca Freitas MA

## 2022-02-24 NOTE — TELEPHONE ENCOUNTER
Insurance doesn't cover glucose meter prescribed   Would like to change for   [employer] test strips for his old meter  Jeannie Domínguez

## 2022-02-24 NOTE — TELEPHONE ENCOUNTER
The scripts given to the pt at his appt state to substitute the blood glucose monitor and supplies to whatever is covered - they make us use the smart set specifically for this purpose, so the pt should be ok

## 2022-03-02 ENCOUNTER — OFFICE VISIT (OUTPATIENT)
Dept: CARDIOLOGY CLINIC | Facility: CLINIC | Age: 77
End: 2022-03-02
Payer: COMMERCIAL

## 2022-03-02 VITALS
DIASTOLIC BLOOD PRESSURE: 80 MMHG | SYSTOLIC BLOOD PRESSURE: 120 MMHG | HEIGHT: 74 IN | WEIGHT: 253 LBS | HEART RATE: 54 BPM | OXYGEN SATURATION: 96 % | BODY MASS INDEX: 32.47 KG/M2 | TEMPERATURE: 97.6 F

## 2022-03-02 DIAGNOSIS — Z95.820 S/P ANGIOPLASTY WITH STENT: ICD-10-CM

## 2022-03-02 DIAGNOSIS — Z95.1 S/P CABG X 3: ICD-10-CM

## 2022-03-02 DIAGNOSIS — I73.9 PERIPHERAL ARTERY DISEASE (HCC): ICD-10-CM

## 2022-03-02 DIAGNOSIS — E78.2 MIXED HYPERLIPIDEMIA: ICD-10-CM

## 2022-03-02 DIAGNOSIS — I10 ESSENTIAL HYPERTENSION: ICD-10-CM

## 2022-03-02 DIAGNOSIS — I25.10 CORONARY ARTERY DISEASE INVOLVING NATIVE CORONARY ARTERY OF NATIVE HEART WITHOUT ANGINA PECTORIS: Primary | ICD-10-CM

## 2022-03-02 DIAGNOSIS — Z98.890 S/P AAA REPAIR: ICD-10-CM

## 2022-03-02 DIAGNOSIS — Z86.79 S/P AAA REPAIR: ICD-10-CM

## 2022-03-02 PROCEDURE — 1036F TOBACCO NON-USER: CPT | Performed by: INTERNAL MEDICINE

## 2022-03-02 PROCEDURE — 3079F DIAST BP 80-89 MM HG: CPT | Performed by: INTERNAL MEDICINE

## 2022-03-02 PROCEDURE — 99214 OFFICE O/P EST MOD 30 MIN: CPT | Performed by: INTERNAL MEDICINE

## 2022-03-02 PROCEDURE — 3074F SYST BP LT 130 MM HG: CPT | Performed by: INTERNAL MEDICINE

## 2022-03-02 PROCEDURE — 1160F RVW MEDS BY RX/DR IN RCRD: CPT | Performed by: INTERNAL MEDICINE

## 2022-03-02 PROCEDURE — 93000 ELECTROCARDIOGRAM COMPLETE: CPT | Performed by: INTERNAL MEDICINE

## 2022-03-02 NOTE — PROGRESS NOTES
Progress Note - Cardiology Office  [facility]    Kim Galvez 76 y o  male MRN: [id number]  : 1945  Encounter: [id number]         ASSESSMENT:  CAD,   s/p CABG X 5 [facility] at [facility],   s/p PCI in  at [facility]    Pharmacologic stress test 2022    Stress Combined Conclusion: Left ventricular perfusion is abnormal  There is mild photon reduction in the anterior wall at stress  Findings are consistent with ischemia  Additional area of infarct involving the mid and apical portion of the inferior wall    Stress Function: Left ventricular function post-stress is abnormal  Global function is mildly reduced  There were no regional wall motion abnormalities during stress  Post-stress ejection fraction is 40 %    Stress ECG: No ST deviation is noted  There were no arrhythmias during recovery    The ECG was negative for ischemia    Perfusion: There is a left ventricular perfusion defect that is small in size with severe reduction in uptake present in the mid to apical inferior location(s) that is fixed   There is a left ventricular perfusion defect that is small to medium in size with mild reduction in uptake present in the mid to apical anterior location(s) that is reversible        S/P Abdominal aortic aneurysm repair w/ Stent      Hypertension  BP today is  120/80 mmHg with heart rate of 54/Min     Mixed hyperlipidemia  On fish oil 2 g daily, atorvastatin 40 mg  2021:  LDL 61, HDL 50, triglycerides 167, normal liver enzymes  Fish oil increased to 4 g daily  2022:  LDL 49, HDL 44, triglycerides 189,  Hemoglobin A1c is 8 9, previously 6 7--> 7 0   normal liver enzymes on 10/14/2021     Patient has not been watching his diet and has been having too many sweets over the holidays which has led to increase in his weight, hemoglobin A1c and triglycerides     Diabetes mellitus type 2     Obesity:  BMI is 32 25     S/p prostatectomy for CA prostate     Open wound  Managed by PCP        RECOMMENDATIONS:  Continue current cardiac medications  Lipid profile and LFTs are scheduled with PCP in April  Low-salt and low-cholesterol diet and regular cardiovascular exercise as tolerated      Please call [phone] if any questions  HPI :     Becky Dawkins is a 76y o  year old male who came for follow up  Patient was recently in the hospital with the stress test showing some areas of infarction and some ischemia  Imdur was added to his medications and he has been asymptomatic    He has been working around the house without any symptoms      REVIEW OF SYSTEMS:  Denies any cardiac symptoms like chest pain, unusual dyspnea, palpitations or syncope    Historical Information   Past Medical History:   Diagnosis Date    CAD (coronary artery disease)     Cancer ([facility] )     prostate    CHF (congestive heart failure) ([facility] )     Diabetes mellitus ([facility] )     Myocardial infarction ([facility] )      Past Surgical History:   Procedure Laterality Date    ADENOIDECTOMY      CARDIAC SURGERY  2002    3 cardiac bypass then angioplasty 2020    CHOLECYSTECTOMY      CORONARY ARTERY BYPASS GRAFT      PROSTATE SURGERY      TONSILLECTOMY       Social History     Substance and Sexual Activity   Alcohol Use Yes    Alcohol/week: 14 0 standard drinks    Types: 14 Cans of beer per week    Comment: 1 -2 daily     Social History     Substance and Sexual Activity   Drug Use Never     Social History     Tobacco Use   Smoking Status Former Smoker    Types: Cigarettes    Quit date: 80    Years since quittin 1   Smokeless Tobacco Never Used     Family History:   Family History   Problem Relation Age of Onset    Diabetes Mother     Alcohol abuse Father     Mental illness Neg Hx        Meds/Allergies     Allergies   Allergen Reactions    Lisinopril Rash and Lip Swelling       Current Outpatient Medications:     amLODIPine (NORVASC) 10 mg tablet, Take 1 tablet (10 mg total) by mouth daily, Disp: 90 tablet, Rfl: 1    aspirin 81 mg chewable tablet, Chew 81 mg daily, Disp: , Rfl:     atorvastatin (LIPITOR) 40 mg tablet, Take 40 mg by mouth daily, Disp: , Rfl:     Blood Glucose Monitoring Suppl (OneTouch Verio Reflect) w/Device KIT, Check blood sugars twice daily  Please substitute with appropriate alternative as covered by patient's insurance  Dx: E11 65, Disp: 1 kit, Rfl: 0    clopidogrel (PLAVIX) 75 mg tablet, Take 75 mg by mouth daily, Disp: , Rfl:     Empagliflozin (Jardiance) 25 MG TABS, Take 1 tablet (25 mg total) by mouth every morning (Patient taking differently: Take 25 mg by mouth every morning Not taking but will discuss with Dr Bianca Phillips ), Disp: 90 tablet, Rfl: 1    gabapentin (NEURONTIN) 600 MG tablet, Take 1 tablet (600 mg total) by mouth 2 (two) times a day, Disp: 180 tablet, Rfl: 0    glimepiride (AMARYL) 2 mg tablet, Take 1 tablet (2 mg total) by mouth 2 (two) times a day, Disp: , Rfl:     glucose blood (OneTouch Verio) test strip, Check blood sugars twice daily  Please substitute with appropriate alternative as covered by patient's insurance   Dx: E11 65, Disp: 200 each, Rfl: 3    hydrochlorothiazide (HYDRODIURIL) 25 mg tablet, Take 25 mg by mouth daily, Disp: , Rfl:     isosorbide mononitrate (IMDUR) 30 mg 24 hr tablet, Take 1 tablet (30 mg total) by mouth daily, Disp: 30 tablet, Rfl: 0    losartan (COZAAR) 100 MG tablet, Take 1 tablet (100 mg total) by mouth daily, Disp: 90 tablet, Rfl: 2    metFORMIN (GLUCOPHAGE) 1000 MG tablet, Take 1 tablet (1,000 mg total) by mouth 2 (two) times a day with meals, Disp: 180 tablet, Rfl: 0    metoprolol succinate (TOPROL-XL) 50 mg 24 hr tablet, Take 1 tablet (50 mg total) by mouth daily, Disp: 90 tablet, Rfl: 3    Multiple Vitamins-Minerals (MULTIVITAMIN MEN 50+ PO), Take by mouth daily, Disp: , Rfl:     Omega-3 Fatty Acids (fish oil) 1,000 mg, Take 4,000 mg by mouth daily , Disp: , Rfl:     OneTouch Delica Lancets 56Z MISC, Check blood sugars twice daily  Please substitute with appropriate alternative as covered by patient's insurance  Dx: E11 65, Disp: 200 each, Rfl: 3    sitaGLIPtin (JANUVIA) 100 mg tablet, Take 100 mg by mouth daily, Disp: , Rfl:     Vitals:   /80 mmHg  HR 54/Min  BP Readings from Last 3 Encounters:   02/22/22 130/80   02/17/22 158/71   01/14/22 130/70       Physical Exam:  Physical Exam    Neurologic:  Alert & oriented x 3, no new focal deficits, Not in any acute distress,  Constitutional:  Well developed, well nourished, non-toxic appearance   Eyes:  Pupil equal and reacting to light, conjunctiva normal,   HENT:  Atraumatic, oropharynx moist, Neck- normal range of motion, no tenderness,  Neck supple, No JVP, No LNP   Respiratory:  Bilateral air entry, mostly clear to auscultation  Cardiovascular: S1-S2 regular with a I/VI ejection systolic murmur   GI:  Soft, nondistended, normal bowel sounds, nontender, no hepatosplenomegaly appreciated  Musculoskeletal:  no tenderness, no deformities  Skin:  Well hydrated, no rash   Lymphatic:  No lymphadenopathy noted   Extremities:  No edema and distal pulses are present        Diagnostic Studies Review Cardio:      EKG:  Sinus bradycardia with first-degree AV block and occasional PVCs, heart rate 54 per minute, left axis deviation, LVH, nonspecific T-wave abnormality    Cardiac testing:   No results found for this or any previous visit  Results for orders placed during the hospital encounter of 02/16/22    NM Myocardial Perfusion Spect (Pharmacological Induced Stress and/or Rest)    Interpretation Summary    Stress Combined Conclusion: Left ventricular perfusion is abnormal  There is mild photon reduction in the anterior wall at stress  Findings are consistent with ischemia  Additional area of infarct involving the mid and apical portion of the inferior wall      Stress Function: Left ventricular function post-stress is abnormal  Global function is mildly reduced  There were no regional wall motion abnormalities during stress  Post-stress ejection fraction is 40 %    Stress ECG: No ST deviation is noted  There were no arrhythmias during recovery    The ECG was negative for ischemia    Perfusion: There is a left ventricular perfusion defect that is small in size with severe reduction in uptake present in the mid to apical inferior location(s) that is fixed  There is a left ventricular perfusion defect that is small to medium in size with mild reduction in uptake present in the mid to apical anterior location(s) that is reversible  No results found for this or any previous visit  Imaging:  Chest X-Ray:   No Chest XR results available for this patient  CT-scan of the chest:     No CTA results available for this patient    Lab Review   Lab Results   Component Value Date    WBC 6 59 02/17/2022    HGB 11 8 (L) 02/17/2022    HCT 35 2 (L) 02/17/2022    MCV 86 02/17/2022    RDW 13 1 02/17/2022     (L) 02/17/2022     BMP:  Lab Results   Component Value Date    SODIUM 140 02/17/2022    K 3 7 02/17/2022     02/17/2022    CO2 27 02/17/2022    BUN 22 02/17/2022    CREATININE 1 04 02/17/2022    GLUC 208 (H) 02/17/2022    GLUF 208 (H) 02/17/2022    CALCIUM 8 3 02/17/2022    CORRECTEDCA 8 9 02/17/2022    EGFR 67 02/17/2022    MG 1 9 02/17/2022     LFT:  Lab Results   Component Value Date    AST 27 02/17/2022    ALT 39 02/17/2022    ALKPHOS 60 02/17/2022    TP 6 8 02/17/2022    ALB 3 2 (L) 02/17/2022      No components found for: TSH3  No results found for: [facility]  Lab Results   Component Value Date    HGBA1C 8 9 (H) 01/11/2022     Lipid Profile:   Lab Results   Component Value Date    CHOLESTEROL 131 01/11/2022    HDL 44 01/11/2022    LDLCALC 49 01/11/2022    TRIG 189 (H) 01/11/2022     Lab Results   Component Value Date    CHOLESTEROL 131 01/11/2022    CHOLESTEROL 144 06/02/2021     No results found for: CKTOTAL, CKMB, CKMBINDEX, TROPONINI  Lab Results   Component Value Date    NTBNP 420 02/16/2022      Recent Results (from the past 672 hour(s))   ECG 12 lead    Collection Time: 02/16/22  3:24 PM   Result Value Ref Range    Ventricular Rate 83 BPM    Atrial Rate 83 BPM    CA Interval 204 ms    QRSD Interval 112 ms    QT Interval 398 ms    QTC Interval 467 ms    P Axis 76 degrees    QRS Axis -60 degrees    T Wave Axis 41 degrees   Comprehensive metabolic panel    Collection Time: 02/16/22  3:49 PM   Result Value Ref Range    Sodium 135 (L) 136 - 145 mmol/L    Potassium 3 9 3 5 - 5 3 mmol/L    Chloride 99 (L) 100 - 108 mmol/L    CO2 28 21 - 32 mmol/L    ANION GAP 8 4 - 13 mmol/L    BUN 28 (H) 5 - 25 mg/dL    Creatinine 1 32 (H) 0 60 - 1 30 mg/dL    Glucose 439 (H) 65 - 140 mg/dL    Calcium 8 6 8 3 - 10 1 mg/dL    AST 23 5 - 45 U/L    ALT 46 12 - 78 U/L    Alkaline Phosphatase 76 46 - 116 U/L    Total Protein 7 6 6 4 - 8 2 g/dL    Albumin 3 7 3 5 - 5 0 g/dL    Total Bilirubin 0 66 0 20 - 1 00 mg/dL    eGFR 50 ml/min/1 73sq m   CBC and differential    Collection Time: 02/16/22  3:49 PM   Result Value Ref Range    WBC 7 48 4 31 - 10 16 Thousand/uL    RBC 4 56 3 88 - 5 62 Million/uL    Hemoglobin 13 1 12 0 - 17 0 g/dL    Hematocrit 39 2 36 5 - 49 3 %    MCV 86 82 - 98 fL    MCH 28 7 26 8 - 34 3 pg    MCHC 33 4 31 4 - 37 4 g/dL    RDW 12 9 11 6 - 15 1 %    MPV 11 5 8 9 - 12 7 fL    Platelets 489 884 - 427 Thousands/uL    nRBC 0 /100 WBCs    Neutrophils Relative 69 43 - 75 %    Immat GRANS % 0 0 - 2 %    Lymphocytes Relative 22 14 - 44 %    Monocytes Relative 7 4 - 12 %    Eosinophils Relative 1 0 - 6 %    Basophils Relative 1 0 - 1 %    Neutrophils Absolute 5 21 1 85 - 7 62 Thousands/µL    Immature Grans Absolute 0 03 0 00 - 0 20 Thousand/uL    Lymphocytes Absolute 1 61 0 60 - 4 47 Thousands/µL    Monocytes Absolute 0 49 0 17 - 1 22 Thousand/µL    Eosinophils Absolute 0 10 0 00 - 0 61 Thousand/µL    Basophils Absolute 0 04 0 00 - 0 10 Thousands/µL Protime-INR    Collection Time: 02/16/22  3:49 PM   Result Value Ref Range    Protime 13 5 11 6 - 14 5 seconds    INR 1 05 0 84 - 1 19   APTT    Collection Time: 02/16/22  3:49 PM   Result Value Ref Range    PTT 30 23 - 37 seconds   HS Troponin 0hr (reflex protocol)    Collection Time: 02/16/22  3:49 PM   Result Value Ref Range    hs TnI 0hr 25 "Refer to ACS Flowchart"- see link ng/L   NT-BNP PRO    Collection Time: 02/16/22  3:49 PM   Result Value Ref Range    NT-proBNP 420 <450 pg/mL   Magnesium    Collection Time: 02/16/22  3:49 PM   Result Value Ref Range    Magnesium 1 8 1 6 - 2 6 mg/dL   HS Troponin I 2hr    Collection Time: 02/16/22  5:57 PM   Result Value Ref Range    hs TnI 2hr 28 "Refer to ACS Flowchart"- see link ng/L    Delta 2hr hsTnI 3 <20 ng/L   COVID/FLU/RSV - 2 hour TAT    Collection Time: 02/16/22  7:28 PM    Specimen: Nose; Nares   Result Value Ref Range    SARS-CoV-2 Negative Negative    INFLUENZA A PCR Negative Negative    INFLUENZA B PCR Negative Negative    RSV PCR Negative Negative   HS Troponin I 4hr    Collection Time: 02/16/22  8:15 PM   Result Value Ref Range    hs TnI 4hr 30 "Refer to ACS Flowchart"- see link ng/L    Delta 4hr hsTnI 5 <20 ng/L   Fingerstick Glucose (POCT)    Collection Time: 02/16/22 10:13 PM   Result Value Ref Range    POC Glucose 289 (H) 65 - 140 mg/dl   Fingerstick Glucose (POCT)    Collection Time: 02/17/22  2:53 AM   Result Value Ref Range    POC Glucose 262 (H) 65 - 140 mg/dl   Comprehensive metabolic panel    Collection Time: 02/17/22  5:05 AM   Result Value Ref Range    Sodium 140 136 - 145 mmol/L    Potassium 3 7 3 5 - 5 3 mmol/L    Chloride 105 100 - 108 mmol/L    CO2 27 21 - 32 mmol/L    ANION GAP 8 4 - 13 mmol/L    BUN 22 5 - 25 mg/dL    Creatinine 1 04 0 60 - 1 30 mg/dL    Glucose 208 (H) 65 - 140 mg/dL    Glucose, Fasting 208 (H) 65 - 99 mg/dL    Calcium 8 3 8 3 - 10 1 mg/dL    Corrected Calcium 8 9 8 3 - 10 1 mg/dL    AST 27 5 - 45 U/L    ALT 39 12 - 78 U/L    Alkaline Phosphatase 60 46 - 116 U/L    Total Protein 6 8 6 4 - 8 2 g/dL    Albumin 3 2 (L) 3 5 - 5 0 g/dL    Total Bilirubin 0 53 0 20 - 1 00 mg/dL    eGFR 67 ml/min/1 73sq m   Magnesium    Collection Time: 02/17/22  5:05 AM   Result Value Ref Range    Magnesium 1 9 1 6 - 2 6 mg/dL   CBC (With Platelets)    Collection Time: 02/17/22  5:05 AM   Result Value Ref Range    WBC 6 59 4 31 - 10 16 Thousand/uL    RBC 4 09 3 88 - 5 62 Million/uL    Hemoglobin 11 8 (L) 12 0 - 17 0 g/dL    Hematocrit 35 2 (L) 36 5 - 49 3 %    MCV 86 82 - 98 fL    MCH 28 9 26 8 - 34 3 pg    MCHC 33 5 31 4 - 37 4 g/dL    RDW 13 1 11 6 - 15 1 %    Platelets 112 (L) 601 - 390 Thousands/uL    MPV 11 2 8 9 - 12 7 fL   Fingerstick Glucose (POCT)    Collection Time: 02/17/22  7:48 AM   Result Value Ref Range    POC Glucose 224 (H) 65 - 140 mg/dl   Stress strip    Collection Time: 02/17/22 10:57 AM   Result Value Ref Range    Protocol Name 100 [facility] SIT     Time In Exercise Phase 00:01:00     MAX   SYSTOLIC  mmHg    Max Diastolic Bp 84 mmHg    Max Heart Rate 88 BPM    Max Predicted Heart Rate 141 BPM    Reason for Termination protocol compleetd     Test Indication Dyspnea  Chest Discomfort       Target Hr Formular (220 - Age)*100%     Arrhy During Ex      ECG Interp Before Ex      ECG Interp during Ex      Ex Summary Comment      Chest Pain Statement none     Overall Hr Response To Exercise      Overall BP Response To Exercise     Fingerstick Glucose (POCT)    Collection Time: 02/17/22 12:33 PM   Result Value Ref Range    POC Glucose 335 (H) 65 - 140 mg/dl   NM Myocardial Perfusion Spect (Pharmacological Induced Stress and/or Rest)    Collection Time: 02/17/22  3:48 PM   Result Value Ref Range    Rest Nuclear Isotope Dose 15 80 mCi    Stress Nuclear Isotope Dose 44 10 mCi    Baseline HR 63 bpm    Baseline /84 mmHg    O2 sat rest 97 %    Stress peak HR 88 bpm    Post peak  mmHg    Rate Pressure Product 16,016 0     O2 sat peak 97 % Recovery HR 64 bpm    Recovery /70 mmHg    O2 sat recovery 96 %    Target HR 88 bpm    Percent HR 62 %    Exercise duration (min) 1 min    Estimated workload 1 0 METS    Angina Index 0     Max HR Percent 62 %    Max  bpm    [place] Treadmill Score 1     Base ST Depresion (mm) 0 mm    ST Depression (mm) 0 mm    Stress/rest perfusion ratio 1 05     EF (%) 40 %   Fingerstick Glucose (POCT)    Collection Time: 02/17/22  4:28 PM   Result Value Ref Range    POC Glucose 284 (H) 65 - 140 mg/dl             Dr Solitario Mcdaniel MD, [facility]      "This note has been constructed using a voice recognition system  Therefore there may be syntax, spelling, and/or grammatical errors   Please call if you have any questions  "

## 2022-03-16 DIAGNOSIS — M77.41 METATARSALGIA OF BOTH FEET: ICD-10-CM

## 2022-03-16 DIAGNOSIS — E11.42 DIABETIC POLYNEUROPATHY ASSOCIATED WITH TYPE 2 DIABETES MELLITUS (HCC): ICD-10-CM

## 2022-03-16 DIAGNOSIS — M77.42 METATARSALGIA OF BOTH FEET: ICD-10-CM

## 2022-03-16 DIAGNOSIS — M54.16 RADICULOPATHY OF LUMBAR REGION: ICD-10-CM

## 2022-03-21 RX ORDER — GABAPENTIN 600 MG/1
TABLET ORAL
Qty: 180 TABLET | Refills: 3 | Status: SHIPPED | OUTPATIENT
Start: 2022-03-21

## 2022-04-10 DIAGNOSIS — E11.42 TYPE 2 DIABETES MELLITUS WITH DIABETIC POLYNEUROPATHY, WITHOUT LONG-TERM CURRENT USE OF INSULIN (HCC): ICD-10-CM

## 2022-04-11 ENCOUNTER — APPOINTMENT (OUTPATIENT)
Dept: LAB | Facility: CLINIC | Age: 77
End: 2022-04-11
Payer: COMMERCIAL

## 2022-04-11 DIAGNOSIS — E78.2 MIXED HYPERLIPIDEMIA: ICD-10-CM

## 2022-04-11 DIAGNOSIS — E11.42 TYPE 2 DIABETES MELLITUS WITH DIABETIC POLYNEUROPATHY, WITHOUT LONG-TERM CURRENT USE OF INSULIN (HCC): ICD-10-CM

## 2022-04-11 LAB
ALBUMIN SERPL BCP-MCNC: 3.8 G/DL (ref 3.5–5)
ALP SERPL-CCNC: 54 U/L (ref 46–116)
ALT SERPL W P-5'-P-CCNC: 36 U/L (ref 12–78)
ANION GAP SERPL CALCULATED.3IONS-SCNC: 5 MMOL/L (ref 4–13)
AST SERPL W P-5'-P-CCNC: 23 U/L (ref 5–45)
BILIRUB DIRECT SERPL-MCNC: 0.1 MG/DL (ref 0–0.2)
BILIRUB SERPL-MCNC: 0.48 MG/DL (ref 0.2–1)
BUN SERPL-MCNC: 36 MG/DL (ref 5–25)
CALCIUM SERPL-MCNC: 9.7 MG/DL (ref 8.3–10.1)
CHLORIDE SERPL-SCNC: 105 MMOL/L (ref 100–108)
CHOLEST SERPL-MCNC: 126 MG/DL
CO2 SERPL-SCNC: 28 MMOL/L (ref 21–32)
CREAT SERPL-MCNC: 1.42 MG/DL (ref 0.6–1.3)
EST. AVERAGE GLUCOSE BLD GHB EST-MCNC: 169 MG/DL
GFR SERPL CREATININE-BSD FRML MDRD: 46 ML/MIN/1.73SQ M
GLUCOSE P FAST SERPL-MCNC: 215 MG/DL (ref 65–99)
HBA1C MFR BLD: 7.5 %
HDLC SERPL-MCNC: 37 MG/DL
LDLC SERPL CALC-MCNC: 50 MG/DL (ref 0–100)
POTASSIUM SERPL-SCNC: 3.9 MMOL/L (ref 3.5–5.3)
PROT SERPL-MCNC: 7.7 G/DL (ref 6.4–8.2)
SODIUM SERPL-SCNC: 138 MMOL/L (ref 136–145)
TRIGL SERPL-MCNC: 196 MG/DL

## 2022-04-11 PROCEDURE — 80061 LIPID PANEL: CPT

## 2022-04-11 PROCEDURE — 82248 BILIRUBIN DIRECT: CPT

## 2022-04-11 PROCEDURE — 80053 COMPREHEN METABOLIC PANEL: CPT

## 2022-04-11 PROCEDURE — 36415 COLL VENOUS BLD VENIPUNCTURE: CPT

## 2022-04-11 PROCEDURE — 83036 HEMOGLOBIN GLYCOSYLATED A1C: CPT

## 2022-04-12 ENCOUNTER — TELEPHONE (OUTPATIENT)
Dept: CARDIOLOGY CLINIC | Facility: CLINIC | Age: 77
End: 2022-04-12

## 2022-04-12 NOTE — TELEPHONE ENCOUNTER
----- Message from Goran Perez MD sent at 4/11/2022  3:48 PM EDT -----  Please call and inform patient that the blood test showed that  1 of the bad cholesterol, triglyceride has gone even higher at 196  Liver enzymes are normalRecommend starting on Tricor/fenofibrate 160 mg dailyIt is also important to  keep blood sugar under good control

## 2022-04-12 NOTE — TELEPHONE ENCOUNTER
Pt advised as noted, agreeable to Tricor and Rx sent to the pharmacy  Pt notes that he is seeing his PCP in a few days to address his blood sugar

## 2022-04-14 ENCOUNTER — OFFICE VISIT (OUTPATIENT)
Dept: FAMILY MEDICINE CLINIC | Facility: CLINIC | Age: 77
End: 2022-04-14
Payer: COMMERCIAL

## 2022-04-14 VITALS
SYSTOLIC BLOOD PRESSURE: 130 MMHG | BODY MASS INDEX: 32.21 KG/M2 | HEART RATE: 60 BPM | TEMPERATURE: 96.8 F | WEIGHT: 251 LBS | DIASTOLIC BLOOD PRESSURE: 60 MMHG | RESPIRATION RATE: 16 BRPM | HEIGHT: 74 IN

## 2022-04-14 DIAGNOSIS — N18.31 STAGE 3A CHRONIC KIDNEY DISEASE (HCC): ICD-10-CM

## 2022-04-14 DIAGNOSIS — E78.2 MIXED HYPERLIPIDEMIA: ICD-10-CM

## 2022-04-14 DIAGNOSIS — I10 ESSENTIAL HYPERTENSION: ICD-10-CM

## 2022-04-14 DIAGNOSIS — E11.42 TYPE 2 DIABETES MELLITUS WITH DIABETIC POLYNEUROPATHY, WITHOUT LONG-TERM CURRENT USE OF INSULIN (HCC): Primary | ICD-10-CM

## 2022-04-14 DIAGNOSIS — I25.10 CORONARY ARTERY DISEASE INVOLVING NATIVE CORONARY ARTERY OF NATIVE HEART WITHOUT ANGINA PECTORIS: ICD-10-CM

## 2022-04-14 DIAGNOSIS — E11.51 TYPE 2 DIABETES MELLITUS WITH DIABETIC PERIPHERAL ANGIOPATHY WITHOUT GANGRENE, WITHOUT LONG-TERM CURRENT USE OF INSULIN (HCC): ICD-10-CM

## 2022-04-14 DIAGNOSIS — I73.9 PERIPHERAL ARTERY DISEASE (HCC): ICD-10-CM

## 2022-04-14 PROCEDURE — 1160F RVW MEDS BY RX/DR IN RCRD: CPT | Performed by: FAMILY MEDICINE

## 2022-04-14 PROCEDURE — 1036F TOBACCO NON-USER: CPT | Performed by: FAMILY MEDICINE

## 2022-04-14 PROCEDURE — 99214 OFFICE O/P EST MOD 30 MIN: CPT | Performed by: FAMILY MEDICINE

## 2022-04-14 NOTE — PROGRESS NOTES
Assessment/Plan:    1  Type 2 diabetes mellitus with diabetic polyneuropathy, without long-term current use of insulin (HCC)  -     Hemoglobin A1C; Future; Expected date: 07/14/2022  -     Microalbumin / creatinine urine ratio; Future; Expected date: 07/14/2022    2  Type 2 diabetes mellitus with diabetic peripheral angiopathy without gangrene, without long-term current use of insulin ([facility] )    3  Essential hypertension  -     Comprehensive metabolic panel; Future; Expected date: 07/14/2022    4  Coronary artery disease involving native coronary artery of native heart without angina pectoris    5  Peripheral artery disease ([facility] )    6  Mixed hyperlipidemia  -     Lipid Panel with Direct LDL reflex; Future; Expected date: 07/14/2022    7  Stage 3a chronic kidney disease (HCC)  -     Comprehensive metabolic panel; Future; Expected date: 07/14/2022    Will follow renal function on next draw  Encouraged to drink fluids  fibrate was started by cardio        There are no Patient Instructions on file for this visit  Return in 3 months (on 7/14/2022) for Diabetes Follow-up and AWV - 30 min  Subjective:      Patient ID: Chris Thacker is a 76 y o  male  Chief Complaint   Patient presents with    Follow-up     medication ac/lpn    Results    Blood Pressure Check    Diabetes       Pt is here to follow up chronic conditions  Pt will be making appt with DR Ary Galeana for diabetic eye exam      The following portions of the patient's history were reviewed and updated as appropriate: allergies, current medications, past family history, past medical history, past social history, past surgical history and problem list     Review of Systems   Constitutional: Negative for activity change, appetite change, chills, diaphoresis, fatigue, fever and unexpected weight change  HENT: Negative for congestion, dental problem, ear pain, mouth sores, sinus pressure, sinus pain, sore throat and trouble swallowing      Eyes: Negative for photophobia, discharge and itching  Respiratory: Negative for apnea, chest tightness and shortness of breath  Cardiovascular: Negative for chest pain, palpitations and leg swelling  Gastrointestinal: Negative for abdominal distention, abdominal pain, blood in stool, nausea and vomiting  Endocrine: Negative for cold intolerance, heat intolerance, polydipsia, polyphagia and polyuria  Genitourinary: Negative for difficulty urinating  Musculoskeletal: Negative for arthralgias  Skin: Negative for color change and wound  Neurological: Negative for dizziness, syncope, speech difficulty and headaches  Hematological: Negative for adenopathy  Psychiatric/Behavioral: Negative for agitation and behavioral problems  Current Outpatient Medications   Medication Sig Dispense Refill    amLODIPine (NORVASC) 10 mg tablet Take 1 tablet (10 mg total) by mouth daily 90 tablet 1    aspirin 81 mg chewable tablet Chew 81 mg daily      atorvastatin (LIPITOR) 40 mg tablet Take 40 mg by mouth daily      Blood Glucose Monitoring Suppl (OneTouch Verio Reflect) w/Device KIT Check blood sugars twice daily  Please substitute with appropriate alternative as covered by patient's insurance  Dx: E11 65 1 kit 0    clopidogrel (PLAVIX) 75 mg tablet Take 75 mg by mouth daily      Empagliflozin (Jardiance) 25 MG TABS Take 1 tablet (25 mg total) by mouth every morning (Patient taking differently: Take 25 mg by mouth every morning Not taking but will discuss with Dr Christie Mason ) 90 tablet 1    fenofibrate 160 MG tablet Take 1 tablet (160 mg total) by mouth daily 90 tablet 2    gabapentin (NEURONTIN) 600 MG tablet TAKE 1 TABLET BY MOUTH  TWICE DAILY 180 tablet 3    glimepiride (AMARYL) 2 mg tablet Take 1 tablet (2 mg total) by mouth 2 (two) times a day      glucose blood (OneTouch Verio) test strip Check blood sugars twice daily  Please substitute with appropriate alternative as covered by patient's insurance   Dx: E11 65 200 each 3    hydrochlorothiazide (HYDRODIURIL) 25 mg tablet Take 25 mg by mouth daily      isosorbide mononitrate (IMDUR) 30 mg 24 hr tablet Take 1 tablet (30 mg total) by mouth daily 30 tablet 0    losartan (COZAAR) 100 MG tablet Take 1 tablet (100 mg total) by mouth daily 90 tablet 2    metFORMIN (GLUCOPHAGE) 1000 MG tablet TAKE 1 TABLET BY MOUTH  TWICE DAILY WITH MEALS 180 tablet 3    metoprolol succinate (TOPROL-XL) 50 mg 24 hr tablet Take 1 tablet (50 mg total) by mouth daily 90 tablet 3    Multiple Vitamins-Minerals (MULTIVITAMIN MEN 50+ PO) Take by mouth daily      Omega-3 Fatty Acids (fish oil) 1,000 mg Take 4,000 mg by mouth daily       OneTouch Delica Lancets 32P MISC Check blood sugars twice daily  Please substitute with appropriate alternative as covered by patient's insurance  Dx: E11 65 200 each 3    sitaGLIPtin (JANUVIA) 100 mg tablet Take 100 mg by mouth daily       No current facility-administered medications for this visit  Objective:    /60   Pulse 60   Temp (!) 96 8 °F (36 °C)   Resp 16   Ht 6' 2" (1 88 m)   Wt 114 kg (251 lb)   BMI 32 23 kg/m²        Physical Exam  Vitals and nursing note reviewed  Constitutional:       General: He is not in acute distress  Appearance: He is well-developed  He is not diaphoretic  HENT:      Head: Normocephalic and atraumatic  Right Ear: External ear normal       Left Ear: External ear normal       Nose: Nose normal       Mouth/Throat:      Pharynx: No oropharyngeal exudate  Eyes:      General: No scleral icterus  Right eye: No discharge  Left eye: No discharge  Pupils: Pupils are equal, round, and reactive to light  Neck:      Thyroid: No thyromegaly  Cardiovascular:      Rate and Rhythm: Normal rate  Heart sounds: Normal heart sounds  No murmur heard  Pulmonary:      Effort: Pulmonary effort is normal  No respiratory distress  Breath sounds: Normal breath sounds  No wheezing  Abdominal:      General: Bowel sounds are normal  There is no distension  Palpations: Abdomen is soft  There is no mass  Tenderness: There is no abdominal tenderness  There is no guarding or rebound  Musculoskeletal:         General: Normal range of motion  Skin:     General: Skin is warm and dry  Findings: No erythema or rash  Neurological:      Mental Status: He is alert  Coordination: Coordination normal       Deep Tendon Reflexes: Reflexes normal    Psychiatric:         Behavior: Behavior normal               Recent Results (from the past 672 hour(s))   Lipid Panel with Direct LDL reflex    Collection Time: 04/11/22  7:13 AM   Result Value Ref Range    Cholesterol 126 See Comment mg/dL    Triglycerides 196 (H) See Comment mg/dL    HDL, Direct 37 (L) >=40 mg/dL    LDL Calculated 50 0 - 100 mg/dL   Hemoglobin A1C    Collection Time: 04/11/22  7:13 AM   Result Value Ref Range    Hemoglobin A1C 7 5 (H) Normal 3 8-5 6%; PreDiabetic 5 7-6 4%;  Diabetic >=6 5%; Glycemic control for adults with diabetes <7 0% %     mg/dl   Comprehensive metabolic panel    Collection Time: 04/11/22  7:13 AM   Result Value Ref Range    Sodium 138 136 - 145 mmol/L    Potassium 3 9 3 5 - 5 3 mmol/L    Chloride 105 100 - 108 mmol/L    CO2 28 21 - 32 mmol/L    ANION GAP 5 4 - 13 mmol/L    BUN 36 (H) 5 - 25 mg/dL    Creatinine 1 42 (H) 0 60 - 1 30 mg/dL    Glucose, Fasting 215 (H) 65 - 99 mg/dL    Calcium 9 7 8 3 - 10 1 mg/dL    AST 23 5 - 45 U/L    ALT 36 12 - 78 U/L    Alkaline Phosphatase 54 46 - 116 U/L    Total Protein 7 7 6 4 - 8 2 g/dL    Albumin 3 8 3 5 - 5 0 g/dL    Total Bilirubin 0 48 0 20 - 1 00 mg/dL    eGFR 46 ml/min/1 73sq m   Bilirubin, direct    Collection Time: 04/11/22  7:13 AM   Result Value Ref Range    Bilirubin, Direct 0 10 0 00 - 0 20 mg/dL         Lola Morillo DO

## 2022-04-26 ENCOUNTER — OFFICE VISIT (OUTPATIENT)
Dept: PODIATRY | Facility: CLINIC | Age: 77
End: 2022-04-26
Payer: COMMERCIAL

## 2022-04-26 VITALS
RESPIRATION RATE: 16 BRPM | DIASTOLIC BLOOD PRESSURE: 60 MMHG | HEIGHT: 74 IN | BODY MASS INDEX: 32.21 KG/M2 | SYSTOLIC BLOOD PRESSURE: 130 MMHG | WEIGHT: 251 LBS

## 2022-04-26 DIAGNOSIS — I70.209 PERIPHERAL ARTERIOSCLEROSIS (HCC): ICD-10-CM

## 2022-04-26 DIAGNOSIS — E11.42 DIABETIC POLYNEUROPATHY ASSOCIATED WITH TYPE 2 DIABETES MELLITUS (HCC): Primary | ICD-10-CM

## 2022-04-26 DIAGNOSIS — M79.671 PAIN IN BOTH FEET: ICD-10-CM

## 2022-04-26 DIAGNOSIS — B35.1 ONYCHOMYCOSIS: ICD-10-CM

## 2022-04-26 DIAGNOSIS — M79.672 PAIN IN BOTH FEET: ICD-10-CM

## 2022-04-26 PROCEDURE — 11721 DEBRIDE NAIL 6 OR MORE: CPT | Performed by: PODIATRIST

## 2022-04-26 NOTE — PROGRESS NOTES
Assessment/Plan:  Metatarsalgia secondary to diabetic neuropathy  Mycosis of nail and skin  Radiculopathy  Pain upon ambulation  Mild peripheral artery disease      Plan  Patient educated on condition  All nails debrided without pain or complication  Patient remain on gabapentin as directed          Diagnoses and all orders for this visit:     Radiculopathy of lumbar region  -     gabapentin (NEURONTIN) 600 MG tablet; Take 1 tablet (600 mg total) by mouth 2 (two) times a day     Dermatophytosis     Tinea pedis of both feet     Diabetic polyneuropathy associated with type 2 diabetes mellitus (HCC)  -     gabapentin (NEURONTIN) 600 MG tablet; Take 1 tablet (600 mg total) by mouth 2 (two) times a day     Onychomycosis     Peripheral arteriosclerosis (HCC)     Pain in both feet     Metatarsalgia of both feet  -     gabapentin (NEURONTIN) 600 MG tablet; Take 1 tablet (600 mg total) by mouth 2 (two) times a day            Subjective:  Patient is doing better on present gabapentin regimen  He complains of pain in his toes with ambulation and shoe wear    No history of trauma          Allergies   Allergen Reactions    Lisinopril Rash and Lip Swelling            Current Outpatient Medications:     amLODIPine (NORVASC) 10 mg tablet, Take 1 tablet (10 mg total) by mouth daily, Disp: 90 tablet, Rfl: 1    aspirin 81 mg chewable tablet, Chew 81 mg daily, Disp: , Rfl:     atorvastatin (LIPITOR) 40 mg tablet, Take 40 mg by mouth daily, Disp: , Rfl:     canagliflozin (Invokana) 100 mg, Take 100 mg by mouth daily before breakfast , Disp: , Rfl:     clopidogrel (PLAVIX) 75 mg tablet, Take 75 mg by mouth daily, Disp: , Rfl:     gabapentin (NEURONTIN) 600 MG tablet, Take 1 tablet (600 mg total) by mouth 2 (two) times a day, Disp: 180 tablet, Rfl: 0    glimepiride (AMARYL) 2 mg tablet, Take 2 mg by mouth every morning before breakfast, Disp: , Rfl:     hydrochlorothiazide (HYDRODIURIL) 25 mg tablet, Take 25 mg by mouth daily, Disp: , Rfl:     losartan (COZAAR) 50 mg tablet, Take 2 tablets (100 mg total) by mouth daily, Disp: 90 tablet, Rfl: 3    metFORMIN (GLUCOPHAGE) 1000 MG tablet, Take 1 tablet (1,000 mg total) by mouth 2 (two) times a day with meals, Disp: 180 tablet, Rfl: 0    metoprolol succinate (TOPROL-XL) 50 mg 24 hr tablet, Take 1 tablet (50 mg total) by mouth daily, Disp: 90 tablet, Rfl: 3    Multiple Vitamins-Minerals (MULTIVITAMIN MEN 50+ PO), Take by mouth daily, Disp: , Rfl:     Omega-3 Fatty Acids (fish oil) 1,000 mg, Take 4,000 mg by mouth daily , Disp: , Rfl:     sitaGLIPtin (JANUVIA) 100 mg tablet, Take 100 mg by mouth daily, Disp: , Rfl:          Patient Active Problem List   Diagnosis    Mixed hyperlipidemia    Essential hypertension    Coronary artery disease involving native coronary artery of native heart without angina pectoris    S/P angioplasty with stent    S/P CABG x 3    S/P AAA repair    S/P prostatectomy    H/O prostate cancer    Type 2 diabetes mellitus with diabetic polyneuropathy, without long-term current use of insulin (HCC)    Varicose veins of left lower extremity    History of endovascular stent graft for abdominal aortic aneurysm (AAA)    Bruit (arterial)    Peripheral artery disease (HCC)             Patient ID: Cale Fitzpatrick is a 76 y o  male      HPI     The following portions of the patient's history were reviewed and updated as appropriate:      family history includes Alcohol abuse in his father; Diabetes in his mother        reports that he quit smoking about 34 years ago  His smoking use included cigarettes  He has never used smokeless tobacco  He reports current alcohol use of about 14 0 standard drinks of alcohol per week  He reports that he does not use drugs         Objective:  Patient's shoes and socks removed     Foot ExamPhysical Exam          General  General Appearance: appears stated age and healthy   Orientation: alert and oriented to person, place, and time   Affect: appropriate   Gait: antalgic         Right Foot/Ankle      Inspection and Palpation  Tenderness: metatarsals   Swelling: none   Arch: pes planus  Claw Toes: fifth toe  Hallux limitus: yes  Skin Exam: callus, dry skin and tinea;      Neurovascular  Dorsalis pedis: 1+  Posterior tibial: 1+  Saphenous nerve sensation: absent  Tibial nerve sensation: absent  Superficial peroneal nerve sensation: absent  Deep peroneal nerve sensation: absent  Sural nerve sensation: absent        Left Foot/Ankle       Inspection and Palpation  Tenderness: metatarsals   Swelling: none   Arch: pes planus  Claw toes: second toe and fifth toe  Hallux limitus: yes  Skin Exam: callus, dry skin and tinea;      Neurovascular  Dorsalis pedis: 1+  Posterior tibial: 1+  Saphenous nerve sensation: absent  Tibial nerve sensation: absent  Superficial peroneal nerve sensation: absent  Deep peroneal nerve sensation: absent  Sural nerve sensation: absent           Physical Exam  Vitals signs and nursing note reviewed  Cardiovascular:      Rate and Rhythm: Normal rate and regular rhythm       Pulses:           Dorsalis pedis pulses are 1+ on the right side and 1+ on the left side         Posterior tibial pulses are 1+ on the right side and 1+ on the left side  Feet:      Right foot:      Skin integrity: Callus and dry skin present       Left foot:      Skin integrity: Callus and dry skin present  Skin:     Capillary Refill: Capillary refill takes 2 to 3 seconds       Comments:   All nails are thick and mycotic   Patient demonstrates bilateral dermatophytosis  Phi Marshall is a pre ulcerative/ corn on the plantar aspect 2nd left toe     Right Foot/Ankle   Right Foot Inspection  Skin Exam: dry skin, callus and callus                                 Vascular     The right DP pulse is 1+  The right PT pulse is 1+     Right Toe  - Comprehensive Exam  Arch: pes planus  Claw Toes: fifth toe  Hallux limitus: yes  Swelling: none   Tenderness: metatarsals            Left Foot/Ankle  Left Foot Inspection  Skin Exam: dry skin and callus                                             Vascular     The left DP pulse is 1+  The left PT pulse is 1+  Left Toe  - Comprehensive Exam  Arch: pes planus  Claw toes: second toe and fifth toe  Hallux limitus: yes  Swelling: none   Tenderness: metatarsals      Patient's shoes and socks removed  Assign Risk Category:  Deformity present; Loss of protective sensation; Weak pulses       Risk: 2

## 2022-05-05 DIAGNOSIS — I10 ESSENTIAL HYPERTENSION: Primary | ICD-10-CM

## 2022-05-05 RX ORDER — HYDROCHLOROTHIAZIDE 25 MG/1
25 TABLET ORAL DAILY
Qty: 90 TABLET | Refills: 3 | Status: SHIPPED | OUTPATIENT
Start: 2022-05-05 | End: 2022-07-21 | Stop reason: SDUPTHER

## 2022-05-10 DIAGNOSIS — I20.8 STABLE ANGINA PECTORIS (HCC): ICD-10-CM

## 2022-05-10 DIAGNOSIS — I25.10 CORONARY ARTERY DISEASE INVOLVING NATIVE CORONARY ARTERY OF NATIVE HEART WITHOUT ANGINA PECTORIS: ICD-10-CM

## 2022-05-10 DIAGNOSIS — E78.2 MIXED HYPERLIPIDEMIA: Primary | ICD-10-CM

## 2022-05-10 DIAGNOSIS — E78.2 MIXED HYPERLIPIDEMIA: ICD-10-CM

## 2022-05-10 RX ORDER — ATORVASTATIN CALCIUM 40 MG/1
40 TABLET, FILM COATED ORAL DAILY
Qty: 90 TABLET | Refills: 3 | Status: SHIPPED | OUTPATIENT
Start: 2022-05-10 | End: 2022-05-10

## 2022-05-10 RX ORDER — FENOFIBRATE 160 MG/1
160 TABLET ORAL DAILY
Qty: 90 TABLET | Refills: 3 | Status: SHIPPED | OUTPATIENT
Start: 2022-05-10

## 2022-05-10 RX ORDER — ATORVASTATIN CALCIUM 40 MG/1
40 TABLET, FILM COATED ORAL DAILY
Qty: 90 TABLET | Refills: 3 | Status: SHIPPED | OUTPATIENT
Start: 2022-05-10

## 2022-05-10 RX ORDER — ISOSORBIDE MONONITRATE 30 MG/1
30 TABLET, EXTENDED RELEASE ORAL DAILY
Qty: 90 TABLET | Refills: 3 | Status: SHIPPED | OUTPATIENT
Start: 2022-05-10

## 2022-05-11 DIAGNOSIS — E11.42 TYPE 2 DIABETES MELLITUS WITH DIABETIC POLYNEUROPATHY, WITHOUT LONG-TERM CURRENT USE OF INSULIN (HCC): ICD-10-CM

## 2022-05-11 RX ORDER — GLIMEPIRIDE 2 MG/1
2 TABLET ORAL 2 TIMES DAILY
Start: 2022-05-11

## 2022-06-30 LAB
LEFT EYE DIABETIC RETINOPATHY: NORMAL
RIGHT EYE DIABETIC RETINOPATHY: NORMAL

## 2022-07-06 ENCOUNTER — OFFICE VISIT (OUTPATIENT)
Dept: PODIATRY | Facility: CLINIC | Age: 77
End: 2022-07-06
Payer: COMMERCIAL

## 2022-07-06 VITALS
HEIGHT: 74 IN | BODY MASS INDEX: 32.21 KG/M2 | DIASTOLIC BLOOD PRESSURE: 60 MMHG | SYSTOLIC BLOOD PRESSURE: 130 MMHG | RESPIRATION RATE: 17 BRPM | WEIGHT: 251 LBS

## 2022-07-06 DIAGNOSIS — M79.671 PAIN IN BOTH FEET: ICD-10-CM

## 2022-07-06 DIAGNOSIS — B35.1 ONYCHOMYCOSIS: ICD-10-CM

## 2022-07-06 DIAGNOSIS — I70.209 PERIPHERAL ARTERIOSCLEROSIS (HCC): ICD-10-CM

## 2022-07-06 DIAGNOSIS — E11.42 DIABETIC POLYNEUROPATHY ASSOCIATED WITH TYPE 2 DIABETES MELLITUS (HCC): Primary | ICD-10-CM

## 2022-07-06 DIAGNOSIS — M79.672 PAIN IN BOTH FEET: ICD-10-CM

## 2022-07-06 PROCEDURE — 11721 DEBRIDE NAIL 6 OR MORE: CPT | Performed by: PODIATRIST

## 2022-07-06 NOTE — PROGRESS NOTES
Assessment/Plan:  Metatarsalgia secondary to diabetic neuropathy   Mycosis of nail and skin   Radiculopathy   Pain upon ambulation   Mild peripheral artery disease      Plan   Patient educated on condition   All nails debrided without pain or complication   Patient remain on gabapentin as directed          Diagnoses and all orders for this visit:     Radiculopathy of lumbar region  -     gabapentin (NEURONTIN) 600 MG tablet; Take 1 tablet (600 mg total) by mouth 2 (two) times a day     Dermatophytosis     Tinea pedis of both feet     Diabetic polyneuropathy associated with type 2 diabetes mellitus ([address] )  -     gabapentin (NEURONTIN) 600 MG tablet; Take 1 tablet (600 mg total) by mouth 2 (two) times a day     Onychomycosis     Peripheral arteriosclerosis (HCC)     Pain in both feet     Metatarsalgia of both feet  -     gabapentin (NEURONTIN) 600 MG tablet;  Take 1 tablet (600 mg total) by mouth 2 (two) times a day            Subjective:  Patient is doing better on present gabapentin regimen   He complains of pain in his toes with ambulation and shoe wear   No history of trauma             Allergies   Allergen Reactions    Lisinopril Rash and Lip Swelling            Current Outpatient Medications:     amLODIPine (NORVASC) 10 mg tablet, Take 1 tablet (10 mg total) by mouth daily, Disp: 90 tablet, Rfl: 1    aspirin 81 mg chewable tablet, Chew 81 mg daily, Disp: , Rfl:     atorvastatin (LIPITOR) 40 mg tablet, Take 40 mg by mouth daily, Disp: , Rfl:     canagliflozin (Invokana) 100 mg, Take 100 mg by mouth daily before breakfast , Disp: , Rfl:     clopidogrel (PLAVIX) 75 mg tablet, Take 75 mg by mouth daily, Disp: , Rfl:     gabapentin (NEURONTIN) 600 MG tablet, Take 1 tablet (600 mg total) by mouth 2 (two) times a day, Disp: 180 tablet, Rfl: 0    glimepiride (AMARYL) 2 mg tablet, Take 2 mg by mouth every morning before breakfast, Disp: , Rfl:     hydrochlorothiazide (HYDRODIURIL) 25 mg tablet, Take 25 mg by mouth daily, Disp: , Rfl:     losartan (COZAAR) 50 mg tablet, Take 2 tablets (100 mg total) by mouth daily, Disp: 90 tablet, Rfl: 3    metFORMIN (GLUCOPHAGE) 1000 MG tablet, Take 1 tablet (1,000 mg total) by mouth 2 (two) times a day with meals, Disp: 180 tablet, Rfl: 0    metoprolol succinate (TOPROL-XL) 50 mg 24 hr tablet, Take 1 tablet (50 mg total) by mouth daily, Disp: 90 tablet, Rfl: 3    Multiple Vitamins-Minerals (MULTIVITAMIN MEN 50+ PO), Take by mouth daily, Disp: , Rfl:     Omega-3 Fatty Acids (fish oil) 1,000 mg, Take 4,000 mg by mouth daily , Disp: , Rfl:     sitaGLIPtin (JANUVIA) 100 mg tablet, Take 100 mg by mouth daily, Disp: , Rfl:            Patient Active Problem List   Diagnosis    Mixed hyperlipidemia    Essential hypertension    Coronary artery disease involving native coronary artery of native heart without angina pectoris    S/P angioplasty with stent    S/P CABG x 3    S/P AAA repair    S/P prostatectomy    H/O prostate cancer    Type 2 diabetes mellitus with diabetic polyneuropathy, without long-term current use of insulin ([facility])    Varicose veins of left lower extremity    History of endovascular stent graft for abdominal aortic aneurysm (AAA)    Bruit (arterial)    Peripheral artery disease ([facility] )             Patient ID: Thomas Horne is a 76 y  o  male      HPI     The following portions of the patient's history were reviewed and updated as appropriate:      family history includes Alcohol abuse in his father; Diabetes in his mother        reports that he quit smoking about 34 years ago  His smoking use included cigarettes  He has never used smokeless tobacco  He reports current alcohol use of about 14 0 standard drinks of alcohol per week   He reports that he does not use drugs         Objective:  Patient's shoes and socks removed    Foot ExamPhysical Exam          General  General Appearance: appears stated age and healthy   Orientation: alert and oriented to person, place, and time   Affect: appropriate   Gait: antalgic         Right Foot/Ankle      Inspection and Palpation  Tenderness: metatarsals   Swelling: none   Arch: pes planus  Claw Toes: fifth toe  Hallux limitus: yes  Skin Exam: callus, dry skin and tinea;      Neurovascular  Dorsalis pedis: 1+  Posterior tibial: 1+  Saphenous nerve sensation: absent  Tibial nerve sensation: absent  Superficial peroneal nerve sensation: absent  Deep peroneal nerve sensation: absent  Sural nerve sensation: absent        Left Foot/Ankle       Inspection and Palpation  Tenderness: metatarsals   Swelling: none   Arch: pes planus  Claw toes: second toe and fifth toe  Hallux limitus: yes  Skin Exam: callus, dry skin and tinea;      Neurovascular  Dorsalis pedis: 1+  Posterior tibial: 1+  Saphenous nerve sensation: absent  Tibial nerve sensation: absent  Superficial peroneal nerve sensation: absent  Deep peroneal nerve sensation: absent  Sural nerve sensation: absent           Physical Exam  Vitals signs and nursing note reviewed  Cardiovascular:      Rate and Rhythm: Normal rate and regular rhythm       Pulses:           Dorsalis pedis pulses are 1+ on the right side and 1+ on the left side         Posterior tibial pulses are 1+ on the right side and 1+ on the left side  Feet:      Right foot:      Skin integrity: Callus and dry skin present       Left foot:      Skin integrity: Callus and dry skin present  Skin:     Capillary Refill: Capillary refill takes 2 to 3 seconds       Comments:   All nails are thick and mycotic   Patient demonstrates bilateral dermatophytosis  Mandaree Anger is a pre ulcerative/ corn on the plantar aspect 2nd left toe     Right Foot/Ankle   Right Foot Inspection  Skin Exam: dry skin, callus and callus                                 Vascular     The right DP pulse is 1+  The right PT pulse is 1+     Right Toe  - Comprehensive Exam  Arch: pes planus  Claw Toes: fifth toe  Hallux limitus: yes  Swelling: none   Tenderness: metatarsals            Left Foot/Ankle  Left Foot Inspection  Skin Exam: dry skin and callus                                             Vascular     The left DP pulse is 1+  The left PT pulse is 1+  Left Toe  - Comprehensive Exam  Arch: pes planus  Claw toes: second toe and fifth toe  Hallux limitus: yes  Swelling: none   Tenderness: metatarsals      Patient's shoes and socks removed  Assign Risk Category:  Deformity present; Loss of protective sensation; Weak pulses       Risk: 2

## 2022-07-19 ENCOUNTER — APPOINTMENT (OUTPATIENT)
Dept: LAB | Facility: CLINIC | Age: 77
End: 2022-07-19
Payer: COMMERCIAL

## 2022-07-19 DIAGNOSIS — N18.31 STAGE 3A CHRONIC KIDNEY DISEASE (HCC): ICD-10-CM

## 2022-07-19 DIAGNOSIS — E78.2 MIXED HYPERLIPIDEMIA: ICD-10-CM

## 2022-07-19 DIAGNOSIS — E11.42 TYPE 2 DIABETES MELLITUS WITH DIABETIC POLYNEUROPATHY, WITHOUT LONG-TERM CURRENT USE OF INSULIN (HCC): ICD-10-CM

## 2022-07-19 DIAGNOSIS — I10 ESSENTIAL HYPERTENSION: ICD-10-CM

## 2022-07-19 LAB
ALBUMIN SERPL BCP-MCNC: 3.6 G/DL (ref 3.5–5)
ALP SERPL-CCNC: 46 U/L (ref 46–116)
ALT SERPL W P-5'-P-CCNC: 37 U/L (ref 12–78)
ANION GAP SERPL CALCULATED.3IONS-SCNC: 4 MMOL/L (ref 4–13)
AST SERPL W P-5'-P-CCNC: 25 U/L (ref 5–45)
BILIRUB SERPL-MCNC: 0.41 MG/DL (ref 0.2–1)
BUN SERPL-MCNC: 37 MG/DL (ref 5–25)
CALCIUM SERPL-MCNC: 9.2 MG/DL (ref 8.3–10.1)
CHLORIDE SERPL-SCNC: 109 MMOL/L (ref 96–108)
CHOLEST SERPL-MCNC: 131 MG/DL
CO2 SERPL-SCNC: 28 MMOL/L (ref 21–32)
CREAT SERPL-MCNC: 1.71 MG/DL (ref 0.6–1.3)
CREAT UR-MCNC: 66.2 MG/DL
EST. AVERAGE GLUCOSE BLD GHB EST-MCNC: 192 MG/DL
GFR SERPL CREATININE-BSD FRML MDRD: 37 ML/MIN/1.73SQ M
GLUCOSE P FAST SERPL-MCNC: 158 MG/DL (ref 65–99)
HBA1C MFR BLD: 8.3 %
HDLC SERPL-MCNC: 46 MG/DL
LDLC SERPL CALC-MCNC: 62 MG/DL (ref 0–100)
MICROALBUMIN UR-MCNC: 48.6 MG/L (ref 0–20)
MICROALBUMIN/CREAT 24H UR: 73 MG/G CREATININE (ref 0–30)
POTASSIUM SERPL-SCNC: 4 MMOL/L (ref 3.5–5.3)
PROT SERPL-MCNC: 7.9 G/DL (ref 6.4–8.4)
SODIUM SERPL-SCNC: 141 MMOL/L (ref 135–147)
TRIGL SERPL-MCNC: 115 MG/DL

## 2022-07-19 PROCEDURE — 80061 LIPID PANEL: CPT

## 2022-07-19 PROCEDURE — 36415 COLL VENOUS BLD VENIPUNCTURE: CPT

## 2022-07-19 PROCEDURE — 83036 HEMOGLOBIN GLYCOSYLATED A1C: CPT

## 2022-07-19 PROCEDURE — 82570 ASSAY OF URINE CREATININE: CPT

## 2022-07-19 PROCEDURE — 80053 COMPREHEN METABOLIC PANEL: CPT

## 2022-07-19 PROCEDURE — 82043 UR ALBUMIN QUANTITATIVE: CPT

## 2022-07-20 ENCOUNTER — OFFICE VISIT (OUTPATIENT)
Dept: CARDIOLOGY CLINIC | Facility: CLINIC | Age: 77
End: 2022-07-20
Payer: COMMERCIAL

## 2022-07-20 ENCOUNTER — TELEPHONE (OUTPATIENT)
Dept: NEPHROLOGY | Facility: CLINIC | Age: 77
End: 2022-07-20

## 2022-07-20 ENCOUNTER — PREP FOR PROCEDURE (OUTPATIENT)
Dept: CARDIOLOGY CLINIC | Facility: CLINIC | Age: 77
End: 2022-07-20

## 2022-07-20 VITALS
WEIGHT: 248 LBS | DIASTOLIC BLOOD PRESSURE: 68 MMHG | BODY MASS INDEX: 31.83 KG/M2 | HEIGHT: 74 IN | TEMPERATURE: 97 F | SYSTOLIC BLOOD PRESSURE: 130 MMHG | OXYGEN SATURATION: 97 % | HEART RATE: 51 BPM

## 2022-07-20 DIAGNOSIS — I10 ESSENTIAL HYPERTENSION: Primary | ICD-10-CM

## 2022-07-20 DIAGNOSIS — R94.31 ABNORMAL EKG: ICD-10-CM

## 2022-07-20 DIAGNOSIS — Z01.818 PREOP EXAMINATION: ICD-10-CM

## 2022-07-20 DIAGNOSIS — I25.10 CORONARY ARTERY DISEASE INVOLVING NATIVE CORONARY ARTERY OF NATIVE HEART, UNSPECIFIED WHETHER ANGINA PRESENT: ICD-10-CM

## 2022-07-20 DIAGNOSIS — N18.31 STAGE 3A CHRONIC KIDNEY DISEASE (HCC): ICD-10-CM

## 2022-07-20 DIAGNOSIS — Z01.818 PREOPERATIVE CLEARANCE: ICD-10-CM

## 2022-07-20 DIAGNOSIS — I25.10 CORONARY ARTERY DISEASE INVOLVING NATIVE CORONARY ARTERY OF NATIVE HEART WITHOUT ANGINA PECTORIS: ICD-10-CM

## 2022-07-20 DIAGNOSIS — R94.39 ABNORMAL FINDING ON CARDIOVASCULAR STRESS TEST: Primary | ICD-10-CM

## 2022-07-20 PROCEDURE — 99214 OFFICE O/P EST MOD 30 MIN: CPT | Performed by: INTERNAL MEDICINE

## 2022-07-20 PROCEDURE — 93000 ELECTROCARDIOGRAM COMPLETE: CPT | Performed by: INTERNAL MEDICINE

## 2022-07-20 NOTE — TELEPHONE ENCOUNTER
Patient is concerned about the late appointment as he needs pre-op clearance  I informed him that patients will sometimes be cleared through lab work, and that if he absolutely needs to be seen in office sooner then we will try to reschedule him for sooner  Please advise if patient needs to be sooner or if he can keep this appointment and be cleared through [facility]

## 2022-07-20 NOTE — TELEPHONE ENCOUNTER
New Patient Intake Form   Patient Details   Slava Ceja     1945     [id number]     Appointment Information   Who is calling to schedule? If not patient, what is callers name? Patient    Referring Provider   Ava Bettencourt     Referring Provider Number [id number]   Reason for Appt (Diagnosis) Stage 3a chronic kidney disease, Preop examination   Is patient aware of why they are being referred? yes   Does Patient have labs done at [place] [address]? If not, where do they go? yes    Has patient had labs / urine work done? List date of most recent lab / urine work yes 7/19   Has patient had a BMP & CBC done in the past 2 years? If so, list the date yes 7/19   Has patient been hospitalized recently? If yes, list name and location of hospital they were in Yes [address] [place] [address]   Has patient been seen by a Nephrologist before? If yes, list name, location and phone number  no   Has patient been see by another Specialty before (ex  Neurology, urology, cardiology)? If yes, please list name, and specialty  Cardio-   [facility] [address]     Has the patient had imaging done? If so, list the most recent date and type of imaging yes Xray 2/16  VAS Abdominal aorta 9/21/21   Does the patient has a stone analysis report if history of kidney stones? no   Appointment Details   Is there a referral on file?  yes    Appointment Date  10/5   Location [place]    Miscellaneous

## 2022-07-20 NOTE — PROGRESS NOTES
Progress Note - Cardiology Office  [facility]    Madie Crump 76 y o  male MRN: [id number]  : 1945  Encounter: [id number]      ASSESSMENT:  CAD,   s/p CABG X 1 JW 1003 at [facility],   s/p [facility]  at [facility]       Pharmacologic stress test 2022    Stress Combined Conclusion: Left ventricular perfusion is abnormal  There is mild photon reduction in the anterior wall at stress   Findings are consistent with ischemia  Additional area of infarct involving the mid and apical portion of the inferior wall    Stress Function: Left ventricular function post-stress is abnormal  Global function is mildly reduced  There were no regional wall motion abnormalities during stress  Post-stress ejection fraction is 40 %    Stress ECG: No ST deviation is noted  There were no arrhythmias during recovery    The ECG was negative for ischemia    Perfusion: There is a left ventricular perfusion defect that is small in size with severe reduction in uptake present in the mid to apical inferior location(s) that is fixed   There is a left ventricular perfusion defect that is small to medium in size with mild reduction in uptake present in the mid to apical anterior location(s) that is reversible         S/P Abdominal aortic aneurysm repair w/ Stent      Hypertension  BP today is  120/80 mmHg with heart rate of 54/Min     Mixed hyperlipidemia  On fish oil 2 g daily, atorvastatin 40 mg  2021:  LDL 61, HDL 50, triglycerides 167, normal liver enzymes  Fish oil increased to 4 g daily  2022:  LDL 49, HDL 44, triglycerides 189,  2022:  LDL 62, , HDL 46, normal AST/ALT       Diabetes mellitus type 2, A1c 8 3     Obesity:  BMI is 32 25     S/p prostatectomy for CA prostate    CKD        RECOMMENDATIONS:  Echocardiogram  Cardiac catheterization   Ambulatory referral to Nephrology  Continue current cardiac medications  Again strongly advised on low sugar diet since his A1c has gone  Low-salt and low-cholesterol diet and regular cardiovascular exercise as tolerated      Please call [phone] if any questions  HPI :     Slava Ceja is a 76y o  year old male who came for follow up  Patient states that he has dyspnea on moderate to severe exertion along with some chest discomfort  His hemoglobin A1c is elevated at 8 3 and I strongly advised him on controlling his blood sugar specially his diet  I also advised him that in case he starts having more dyspnea on exertion or any chest pain or pressure that is not immediately relieved when he rests, he should at once go to the ED and seek evaluation      REVIEW OF SYSTEMS:  Dyspnea on exertion and chest discomfort    Historical Information   Past Medical History:   Diagnosis Date    CAD (coronary artery disease)     Cancer ([facility] )     prostate    CHF (congestive heart failure) ([place] [address] )     Diabetes mellitus ([place] [address] )     Myocardial infarction [facility])      Past Surgical History:   Procedure Laterality Date    ADENOIDECTOMY      CARDIAC SURGERY  2002    3 cardiac bypass then angioplasty 2020    CHOLECYSTECTOMY      CORONARY ARTERY BYPASS GRAFT      PROSTATE SURGERY      TONSILLECTOMY       Social History     Substance and Sexual Activity   Alcohol Use Yes    Alcohol/week: 14 0 standard drinks    Types: 14 Cans of beer per week    Comment: 1 -2 daily     Social History     Substance and Sexual Activity   Drug Use Never     Social History     Tobacco Use   Smoking Status Former Smoker    Types: Cigarettes    Quit date: 80    Years since quittin 5   Smokeless Tobacco Never Used     Family History:   Family History   Problem Relation Age of Onset    Diabetes Mother     Alcohol abuse Father     Mental illness Neg Hx        Meds/Allergies     Allergies   Allergen Reactions    Lisinopril Rash and Lip Swelling       Current Outpatient Medications:     amLODIPine (NORVASC) 10 mg tablet, Take 1 tablet (10 mg total) by mouth daily, Disp: 90 tablet, Rfl: 1    aspirin 81 mg chewable tablet, Chew 81 mg daily, Disp: , Rfl:     atorvastatin (LIPITOR) 40 mg tablet, Take 1 tablet (40 mg total) by mouth daily, Disp: 90 tablet, Rfl: 3    Blood Glucose Monitoring Suppl (OneTouch Verio Reflect) w/Device KIT, Check blood sugars twice daily  Please substitute with appropriate alternative as covered by patient's insurance  Dx: E11 65, Disp: 1 kit, Rfl: 0    clopidogrel (PLAVIX) 75 mg tablet, Take 75 mg by mouth daily, Disp: , Rfl:     Empagliflozin (Jardiance) 25 MG TABS, Take 1 tablet (25 mg total) by mouth every morning, Disp: 90 tablet, Rfl: 1    fenofibrate 160 MG tablet, Take 1 tablet (160 mg total) by mouth daily, Disp: 90 tablet, Rfl: 3    gabapentin (NEURONTIN) 600 MG tablet, TAKE 1 TABLET BY MOUTH  TWICE DAILY, Disp: 180 tablet, Rfl: 3    glimepiride (AMARYL) 2 mg tablet, Take 1 tablet (2 mg total) by mouth in the morning and 1 tablet (2 mg total) before bedtime  , Disp: , Rfl:     glucose blood (OneTouch Verio) test strip, Check blood sugars twice daily  Please substitute with appropriate alternative as covered by patient's insurance   Dx: E11 65, Disp: 200 each, Rfl: 3    hydrochlorothiazide (HYDRODIURIL) 25 mg tablet, Take 1 tablet (25 mg total) by mouth daily, Disp: 90 tablet, Rfl: 3    isosorbide mononitrate (IMDUR) 30 mg 24 hr tablet, Take 1 tablet (30 mg total) by mouth daily, Disp: 90 tablet, Rfl: 3    losartan (COZAAR) 100 MG tablet, Take 1 tablet (100 mg total) by mouth daily, Disp: 90 tablet, Rfl: 2    metFORMIN (GLUCOPHAGE) 1000 MG tablet, TAKE 1 TABLET BY MOUTH  TWICE DAILY WITH MEALS, Disp: 180 tablet, Rfl: 3    metoprolol succinate (TOPROL-XL) 50 mg 24 hr tablet, Take 1 tablet (50 mg total) by mouth daily, Disp: 90 tablet, Rfl: 3    Multiple Vitamins-Minerals (MULTIVITAMIN MEN 50+ PO), Take by mouth daily, Disp: , Rfl:     Omega-3 Fatty Acids (fish oil) 1,000 mg, Take 4,000 mg by mouth daily , Disp: , Rfl:     OneTouch Delica Lancets 73P MISC, Check blood sugars twice daily  Please substitute with appropriate alternative as covered by patient's insurance  Dx: E11 65, Disp: 200 each, Rfl: 3    sitaGLIPtin (JANUVIA) 100 mg tablet, Take 100 mg by mouth daily, Disp: , Rfl:     Vitals: Blood pressure 130/68, pulse (!) 51, temperature (!) 97 °F (36 1 °C), height 6' 2" (1 88 m), weight 112 kg (248 lb), SpO2 97 %  ?  Body mass index is 31 84 kg/m²  Vitals:    07/20/22 0836   Weight: 112 kg (248 lb)     BP Readings from Last 3 Encounters:   07/20/22 130/68   07/06/22 130/60   04/26/22 130/60       Physical Exam:    Neurologic:  Alert & oriented x 3, no new focal deficits, Not in any acute distress,  Constitutional:  Well developed, well nourished, non-toxic appearance   Eyes:  Pupil equal and reacting to light, conjunctiva normal,   HENT:  Atraumatic, oropharynx moist, Neck- normal range of motion, no tenderness,  Neck supple, No JVP, No LNP   Respiratory:  Bilateral air entry, mostly clear to auscultation  Cardiovascular: S1-S2 regular with a I/VI ejection systolic murmur   GI:  Soft, nondistended, normal bowel sounds, nontender, no hepatosplenomegaly appreciated  Musculoskeletal: no tenderness, no deformities  Skin:  Well hydrated, no rash   Lymphatic:  No lymphadenopathy noted   Extremities:  No edema and distal pulses are present        Diagnostic Studies Review Cardio:      EKG:  Sinus bradycardia with first-degree AV block, heart rate 51 per minute  QR pattern suggests RV conduction delay  LAFB  LVH with QRS widening    ST and T-wave abnormality, consider inferolateral ischemia    Cardiac testing:       Results for orders placed during the hospital encounter of 02/16/22    NM Myocardial Perfusion Spect (Pharmacological Induced Stress and/or Rest)    Interpretation Summary    Stress Combined Conclusion: Left ventricular perfusion is abnormal  There is mild photon reduction in the anterior wall at stress  Findings are consistent with ischemia  Additional area of infarct involving the mid and apical portion of the inferior wall    Stress Function: Left ventricular function post-stress is abnormal  Global function is mildly reduced  There were no regional wall motion abnormalities during stress  Post-stress ejection fraction is 40 %    Stress ECG: No ST deviation is noted  There were no arrhythmias during recovery    The ECG was negative for ischemia    Perfusion: There is a left ventricular perfusion defect that is small in size with severe reduction in uptake present in the mid to apical inferior location(s) that is fixed  There is a left ventricular perfusion defect that is small to medium in size with mild reduction in uptake present in the mid to apical anterior location(s) that is reversible  Imaging:  Chest X-Ray:   No Chest XR results available for this patient  CT-scan of the chest:     No CTA results available for this patient    Lab Review   Lab Results   Component Value Date    WBC 6 59 02/17/2022    HGB 11 8 (L) 02/17/2022    HCT 35 2 (L) 02/17/2022    MCV 86 02/17/2022    RDW 13 1 02/17/2022     (L) 02/17/2022     BMP:  Lab Results   Component Value Date    SODIUM 141 07/19/2022    K 4 0 07/19/2022     (H) 07/19/2022    CO2 28 07/19/2022    BUN 37 (H) 07/19/2022    CREATININE 1 71 (H) 07/19/2022    GLUC 208 (H) 02/17/2022    GLUF 158 (H) 07/19/2022    CALCIUM 9 2 07/19/2022    CORRECTEDCA 8 9 02/17/2022    EGFR 37 07/19/2022    MG 1 9 02/17/2022     LFT:  Lab Results   Component Value Date    AST 25 07/19/2022    ALT 37 07/19/2022    ALKPHOS 46 07/19/2022    TP 7 9 07/19/2022    ALB 3 6 07/19/2022      No components found for: TSH3  No results found for: [facility]  Lab Results   Component Value Date    HGBA1C 8 3 (H) 07/19/2022     Lipid Profile:   Lab Results   Component Value Date    CHOLESTEROL 131 07/19/2022    HDL 46 07/19/2022    LDLCALC 62 07/19/2022    TRIG 115 07/19/2022     Lab Results   Component Value Date    CHOLESTEROL 131 07/19/2022    CHOLESTEROL 126 04/11/2022     No results found for: CKTOTAL, CKMB, CKMBINDEX, TROPONINI  Lab Results   Component Value Date    NTBNP 420 02/16/2022      Recent Results (from the past 672 hour(s))   Hemoglobin A1C    Collection Time: 07/19/22  8:24 AM   Result Value Ref Range    Hemoglobin A1C 8 3 (H) Normal 3 8-5 6%; PreDiabetic 5 7-6 4%; Diabetic >=6 5%; Glycemic control for adults with diabetes <7 0% %     mg/dl   Comprehensive metabolic panel    Collection Time: 07/19/22  8:24 AM   Result Value Ref Range    Sodium 141 135 - 147 mmol/L    Potassium 4 0 3 5 - 5 3 mmol/L    Chloride 109 (H) 96 - 108 mmol/L    CO2 28 21 - 32 mmol/L    ANION GAP 4 4 - 13 mmol/L    BUN 37 (H) 5 - 25 mg/dL    Creatinine 1 71 (H) 0 60 - 1 30 mg/dL    Glucose, Fasting 158 (H) 65 - 99 mg/dL    Calcium 9 2 8 3 - 10 1 mg/dL    AST 25 5 - 45 U/L    ALT 37 12 - 78 U/L    Alkaline Phosphatase 46 46 - 116 U/L    Total Protein 7 9 6 4 - 8 4 g/dL    Albumin 3 6 3 5 - 5 0 g/dL    Total Bilirubin 0 41 0 20 - 1 00 mg/dL    eGFR 37 ml/min/1 73sq m   Microalbumin / creatinine urine ratio    Collection Time: 07/19/22  8:24 AM   Result Value Ref Range    Creatinine, Ur 66 2 mg/dL    Microalbum  ,U,Random 48 6 (H) 0 0 - 20 0 mg/L    Microalb Creat Ratio 73 (H) 0 - 30 mg/g creatinine   Lipid Panel with Direct LDL reflex    Collection Time: 07/19/22  8:24 AM   Result Value Ref Range    Cholesterol 131 See Comment mg/dL    Triglycerides 115 See Comment mg/dL    HDL, Direct 46 >=40 mg/dL    LDL Calculated 62 0 - 100 mg/dL             Dr Soyla Opitz, MD, [facility] [place]      "This note has been constructed using a voice recognition system  Therefore there may be syntax, spelling, and/or grammatical errors   Please call if you have any questions  "

## 2022-07-21 ENCOUNTER — CONSULT (OUTPATIENT)
Dept: NEPHROLOGY | Facility: CLINIC | Age: 77
End: 2022-07-21
Payer: COMMERCIAL

## 2022-07-21 VITALS
DIASTOLIC BLOOD PRESSURE: 62 MMHG | BODY MASS INDEX: 31.95 KG/M2 | WEIGHT: 249 LBS | HEART RATE: 58 BPM | SYSTOLIC BLOOD PRESSURE: 120 MMHG | HEIGHT: 74 IN

## 2022-07-21 DIAGNOSIS — R80.9 MICROALBUMINURIA: ICD-10-CM

## 2022-07-21 DIAGNOSIS — N18.30 BENIGN HYPERTENSION WITH CKD (CHRONIC KIDNEY DISEASE) STAGE III (HCC): Primary | ICD-10-CM

## 2022-07-21 DIAGNOSIS — N17.9 AKI (ACUTE KIDNEY INJURY) (HCC): ICD-10-CM

## 2022-07-21 DIAGNOSIS — I12.9 BENIGN HYPERTENSION WITH CKD (CHRONIC KIDNEY DISEASE) STAGE III (HCC): Primary | ICD-10-CM

## 2022-07-21 DIAGNOSIS — N18.31 STAGE 3A CHRONIC KIDNEY DISEASE (HCC): ICD-10-CM

## 2022-07-21 DIAGNOSIS — I10 ESSENTIAL HYPERTENSION: ICD-10-CM

## 2022-07-21 DIAGNOSIS — Z01.818 PREOP EXAMINATION: ICD-10-CM

## 2022-07-21 LAB
SL AMB  POCT GLUCOSE, UA: 2000>
SL AMB LEUKOCYTE ESTERASE,UA: NORMAL
SL AMB POCT BILIRUBIN,UA: NORMAL
SL AMB POCT BLOOD,UA: NORMAL
SL AMB POCT KETONES,UA: NORMAL
SL AMB POCT NITRITE,UA: NORMAL
SL AMB POCT PH,UA: 5
SL AMB POCT SPECIFIC GRAVITY,UA: 1.02
SL AMB POCT URINE PROTEIN: NORMAL
SL AMB POCT UROBILINOGEN: 0.2

## 2022-07-21 PROCEDURE — 81002 URINALYSIS NONAUTO W/O SCOPE: CPT | Performed by: INTERNAL MEDICINE

## 2022-07-21 PROCEDURE — 99204 OFFICE O/P NEW MOD 45 MIN: CPT | Performed by: INTERNAL MEDICINE

## 2022-07-21 RX ORDER — HYDROCHLOROTHIAZIDE 12.5 MG/1
12.5 TABLET ORAL DAILY
Qty: 30 TABLET | Refills: 0 | Status: SHIPPED | OUTPATIENT
Start: 2022-07-21

## 2022-07-21 NOTE — PROGRESS NOTES
NEPHROLOGY OUTPATIENT CONSULTATION   Marsha Yang 76 y o  male MRN: [id number]  Date: 7/21/2022  Reason for consultation:   Chief Complaint   Patient presents with    Consult    Chronic Kidney Disease    Acute Renal Failure    Hypertension       ASSESSMENT AND PLAN:  IVY on suspected CKD stage 3, no steady baseline, creatinine 1 4 in April 2022, prior 1 1 going back to 2021  -last creatinine 1 7 in July 2022  -? Exhibit etiology for IVY, suspect prerenal component, use of losartan, HCTZ  -also concern for progressive CKD?  -CKD suspect in the setting of underlying hypertension, diabetes, age-related nephron loss  -reduce HCTZ to 12 5 mg daily  -recommend to drink plenty of free water to stay hydrated  -BMP in one week  -if creatinine not improving further, consider discontinue HCTZ, reducing losartan and renal ultrasound  -tentative plan for eventual cardiac catheterization noted as per Cardiology  Unless this is urgent/emergent, would recommend to avoid contrast exposure until renal function is improved and stable   -discussed with patient that he remains at risk of worsening IVY with small possibility of dialysis with contrast exposure    He verbalized understanding of my discussion with him   -UA in the office today shows no proteinuria, + blood  -also recommended to hold losartan, HCTZ the day before and on the day of contrast exposure in the future    Microalbuminuria  -last urine microalbumin/creatinine ratio 72 mg in July 2022  -currently on losartan 100 mg daily  -suspect in the setting of long-term diabetes, hypertension   -last hemoglobin A1c 8 3 in July 2022  -if creatinine does not improve, consider reducing losartan    Hypertension  -reduce HCTZ to 12 5 mg daily  -continue isosorbide 30 mg daily, losartan 100 mg daily, Toprol-XL 50 mg daily, amlodipine 10 mg daily  -blood pressure well controlled in the office today  -advised to check BP at home and call back if remains persistently greater than 135/85  -salt restricted diet recommended    HISTORY OF PRESENT ILLNESS:  Harjeet Covarrubias is a 76y o  year old male with medical issues of hypertension for many years, diabetes for 25 years, CAD, status post PCI in 2021, status post CABG in 2002, prostate carcinoma, status post prostatectomy, AAA, status post EVAR, hyperlipidemia, isolated nephrolithiasis many years ago who presents for initial consultation for IVY/CKD, hypertension management  Old medical records including prior levels were reviewed from [facility]  Patient's baseline serum creatinine 1 1 going back to 2021, had episode of elevated creatinine 1 3 in February 2022 when he was hospitalized and creatinine improved to 1 0 on discharge  Isolated another creatinine 1 4 in April 2022  Patient denies any nausea, vomiting, diarrhea  Denies any recent NSAID use  Denies any new urinary complaints, does have 2 to 3 times nocturia, also has chronic urinary urgency  Does have somewhat exertional chest discomfort  REVIEW OF SYSTEMS:    More than 10 point review of systems were obtained and discussed in length with the patient  Complete review of systems were negative / unremarkable except mentioned in the HPI section  PHYSICAL EXAM:  Vitals:    07/21/22 1347   BP: 120/62   BP Location: Left arm   Patient Position: Sitting   Cuff Size: Standard   Pulse: 58   Weight: 113 kg (249 lb)   Height: 6' 2" (1 88 m)     Body mass index is 31 97 kg/m²  Physical Exam  Vitals reviewed  Constitutional:       Appearance: He is well-developed  HENT:      Head: Normocephalic and atraumatic  Right Ear: External ear normal       Left Ear: External ear normal    Eyes:      General: No scleral icterus  Conjunctiva/sclera: Conjunctivae normal    Cardiovascular:      Comments: S1, S2 present  Pulmonary:      Effort: Pulmonary effort is normal  No respiratory distress  Breath sounds: Normal breath sounds  No wheezing or rales     Abdominal: General: Bowel sounds are normal  There is no distension  Palpations: Abdomen is soft  Tenderness: There is no abdominal tenderness  Musculoskeletal:         General: No deformity  Right lower leg: No edema  Left lower leg: No edema  Lymphadenopathy:      Cervical: No cervical adenopathy  Skin:     Findings: No rash  Neurological:      Mental Status: He is alert and oriented to person, place, and time     Psychiatric:         Behavior: Behavior normal          PAST MEDICAL HISTORY:  Past Medical History:   Diagnosis Date    CAD (coronary artery disease)     Cancer ([facility] )     prostate    [facility] ([facility])     Diabetes mellitus (HCC)     Myocardial infarction ([facility] )        PAST SURGICAL HISTORY:  Past Surgical History:   Procedure Laterality Date    ADENOIDECTOMY      CARDIAC SURGERY      3 cardiac bypass then angioplasty 2020    CHOLECYSTECTOMY      CORONARY ARTERY BYPASS GRAFT      PROSTATE SURGERY      TONSILLECTOMY         ALLERGIES:  Allergies   Allergen Reactions    Lisinopril Rash and Lip Swelling       SOCIAL HISTORY:  Social History     Substance and Sexual Activity   Alcohol Use Yes    Alcohol/week: 14 0 standard drinks    Types: 14 Cans of beer per week    Comment: 1 -2 daily     Social History     Substance and Sexual Activity   Drug Use Never     Social History     Tobacco Use   Smoking Status Former Smoker    Types: Cigarettes    Quit date: 80    Years since quittin 5   Smokeless Tobacco Never Used       FAMILY HISTORY:  Family History   Problem Relation Age of Onset    Diabetes Mother     Alcohol abuse Father     Mental illness Neg Hx        MEDICATIONS:    Current Outpatient Medications:     amLODIPine (NORVASC) 10 mg tablet, Take 1 tablet (10 mg total) by mouth daily, Disp: 90 tablet, Rfl: 1    aspirin 81 mg chewable tablet, Chew 81 mg daily, Disp: , Rfl:     atorvastatin (LIPITOR) 40 mg tablet, Take 1 tablet (40 mg total) by mouth daily, Disp: 90 tablet, Rfl: 3    clopidogrel (PLAVIX) 75 mg tablet, Take 75 mg by mouth daily, Disp: , Rfl:     Empagliflozin (Jardiance) 25 MG TABS, Take 1 tablet (25 mg total) by mouth every morning, Disp: 90 tablet, Rfl: 1    fenofibrate 160 MG tablet, Take 1 tablet (160 mg total) by mouth daily, Disp: 90 tablet, Rfl: 3    gabapentin (NEURONTIN) 600 MG tablet, TAKE 1 TABLET BY MOUTH  TWICE DAILY, Disp: 180 tablet, Rfl: 3    glimepiride (AMARYL) 2 mg tablet, Take 1 tablet (2 mg total) by mouth in the morning and 1 tablet (2 mg total) before bedtime  , Disp: , Rfl:     hydrochlorothiazide (HYDRODIURIL) 25 mg tablet, Take 1 tablet (25 mg total) by mouth daily, Disp: 90 tablet, Rfl: 3    isosorbide mononitrate (IMDUR) 30 mg 24 hr tablet, Take 1 tablet (30 mg total) by mouth daily, Disp: 90 tablet, Rfl: 3    losartan (COZAAR) 100 MG tablet, Take 1 tablet (100 mg total) by mouth daily, Disp: 90 tablet, Rfl: 2    metFORMIN (GLUCOPHAGE) 1000 MG tablet, TAKE 1 TABLET BY MOUTH  TWICE DAILY WITH MEALS, Disp: 180 tablet, Rfl: 3    metoprolol succinate (TOPROL-XL) 50 mg 24 hr tablet, Take 1 tablet (50 mg total) by mouth daily, Disp: 90 tablet, Rfl: 3    Multiple Vitamins-Minerals (MULTIVITAMIN MEN 50+ PO), Take by mouth daily, Disp: , Rfl:     Omega-3 Fatty Acids (fish oil) 1,000 mg, Take 4,000 mg by mouth daily , Disp: , Rfl:     sitaGLIPtin (JANUVIA) 100 mg tablet, Take 100 mg by mouth daily, Disp: , Rfl:     Blood Glucose Monitoring Suppl (OneTouch Verio Reflect) w/Device KIT, Check blood sugars twice daily  Please substitute with appropriate alternative as covered by patient's insurance  Dx: E11 65, Disp: 1 kit, Rfl: 0    glucose blood (OneTouch Verio) test strip, Check blood sugars twice daily  Please substitute with appropriate alternative as covered by patient's insurance  Dx: E11 65, Disp: 200 each, Rfl: 3    OneTouch Delica Lancets 07P MISC, Check blood sugars twice daily   Please substitute with appropriate alternative as covered by patient's insurance  Dx: E11 65, Disp: 200 each, Rfl: 3    Lab Results:   Results for orders placed or performed in visit on 07/19/22   Hemoglobin A1C   Result Value Ref Range    Hemoglobin A1C 8 3 (H) Normal 3 8-5 6%; PreDiabetic 5 7-6 4%; Diabetic >=6 5%; Glycemic control for adults with diabetes <7 0% %     mg/dl   Comprehensive metabolic panel   Result Value Ref Range    Sodium 141 135 - 147 mmol/L    Potassium 4 0 3 5 - 5 3 mmol/L    Chloride 109 (H) 96 - 108 mmol/L    CO2 28 21 - 32 mmol/L    ANION GAP 4 4 - 13 mmol/L    BUN 37 (H) 5 - 25 mg/dL    Creatinine 1 71 (H) 0 60 - 1 30 mg/dL    Glucose, Fasting 158 (H) 65 - 99 mg/dL    Calcium 9 2 8 3 - 10 1 mg/dL    AST 25 5 - 45 U/L    ALT 37 12 - 78 U/L    Alkaline Phosphatase 46 46 - 116 U/L    Total Protein 7 9 6 4 - 8 4 g/dL    Albumin 3 6 3 5 - 5 0 g/dL    Total Bilirubin 0 41 0 20 - 1 00 mg/dL    eGFR 37 ml/min/1 73sq m   Microalbumin / creatinine urine ratio   Result Value Ref Range    Creatinine, Ur 66 2 mg/dL    Microalbum  ,U,Random 48 6 (H) 0 0 - 20 0 mg/L    Microalb Creat Ratio 73 (H) 0 - 30 mg/g creatinine   Lipid Panel with Direct LDL reflex   Result Value Ref Range    Cholesterol 131 See Comment mg/dL    Triglycerides 115 See Comment mg/dL    HDL, Direct 46 >=40 mg/dL    LDL Calculated 62 0 - 100 mg/dL       Portions of the record may have been created with voice recognition software  Occasional wrong word or "sound a like" substitutions may have occurred due to the inherent limitations of voice recognition software  Read the chart carefully and recognize, using context, where substitutions have occurred

## 2022-07-21 NOTE — PATIENT INSTRUCTIONS
-decrease hydrochlorothiazide to 12 5 mg daily  -hold hydrochlorothiazide, losartan the day before and on the day of cardiac catheterization  -continue to drink plenty of water to stay hydrated  -repeat blood test in 1 week after reducing hydrochlorothiazide

## 2022-07-22 ENCOUNTER — OFFICE VISIT (OUTPATIENT)
Dept: FAMILY MEDICINE CLINIC | Facility: CLINIC | Age: 77
End: 2022-07-22
Payer: COMMERCIAL

## 2022-07-22 VITALS
SYSTOLIC BLOOD PRESSURE: 122 MMHG | OXYGEN SATURATION: 98 % | RESPIRATION RATE: 16 BRPM | DIASTOLIC BLOOD PRESSURE: 70 MMHG | TEMPERATURE: 97.5 F | WEIGHT: 247 LBS | HEART RATE: 70 BPM | HEIGHT: 74 IN | BODY MASS INDEX: 31.7 KG/M2

## 2022-07-22 DIAGNOSIS — Z00.00 MEDICARE ANNUAL WELLNESS VISIT, SUBSEQUENT: Primary | ICD-10-CM

## 2022-07-22 DIAGNOSIS — I25.10 CORONARY ARTERY DISEASE INVOLVING NATIVE CORONARY ARTERY OF NATIVE HEART WITHOUT ANGINA PECTORIS: ICD-10-CM

## 2022-07-22 DIAGNOSIS — D69.6 PLATELETS DECREASED (HCC): ICD-10-CM

## 2022-07-22 DIAGNOSIS — E78.2 MIXED HYPERLIPIDEMIA: ICD-10-CM

## 2022-07-22 DIAGNOSIS — I10 ESSENTIAL HYPERTENSION: ICD-10-CM

## 2022-07-22 DIAGNOSIS — E11.51 TYPE 2 DIABETES MELLITUS WITH DIABETIC PERIPHERAL ANGIOPATHY WITHOUT GANGRENE, WITHOUT LONG-TERM CURRENT USE OF INSULIN (HCC): ICD-10-CM

## 2022-07-22 DIAGNOSIS — Z12.11 SCREEN FOR COLON CANCER: ICD-10-CM

## 2022-07-22 DIAGNOSIS — Z12.5 SCREENING FOR PROSTATE CANCER: ICD-10-CM

## 2022-07-22 DIAGNOSIS — E11.42 TYPE 2 DIABETES MELLITUS WITH DIABETIC POLYNEUROPATHY, WITHOUT LONG-TERM CURRENT USE OF INSULIN (HCC): ICD-10-CM

## 2022-07-22 DIAGNOSIS — I20.0 UNSTABLE ANGINA (HCC): ICD-10-CM

## 2022-07-22 DIAGNOSIS — N18.31 STAGE 3A CHRONIC KIDNEY DISEASE (HCC): ICD-10-CM

## 2022-07-22 PROCEDURE — 3288F FALL RISK ASSESSMENT DOCD: CPT | Performed by: FAMILY MEDICINE

## 2022-07-22 PROCEDURE — 99214 OFFICE O/P EST MOD 30 MIN: CPT | Performed by: FAMILY MEDICINE

## 2022-07-22 PROCEDURE — 3725F SCREEN DEPRESSION PERFORMED: CPT | Performed by: FAMILY MEDICINE

## 2022-07-22 PROCEDURE — G0439 PPPS, SUBSEQ VISIT: HCPCS | Performed by: FAMILY MEDICINE

## 2022-07-22 PROCEDURE — 1125F AMNT PAIN NOTED PAIN PRSNT: CPT | Performed by: FAMILY MEDICINE

## 2022-07-22 PROCEDURE — 1170F FXNL STATUS ASSESSED: CPT | Performed by: FAMILY MEDICINE

## 2022-07-22 PROCEDURE — 1003F LEVEL OF ACTIVITY ASSESS: CPT | Performed by: FAMILY MEDICINE

## 2022-07-22 PROCEDURE — 1101F PT FALLS ASSESS-DOCD LE1/YR: CPT | Performed by: FAMILY MEDICINE

## 2022-07-22 RX ORDER — INSULIN GLARGINE 100 [IU]/ML
10 INJECTION, SOLUTION SUBCUTANEOUS EVERY EVENING
Qty: 15 ML | Refills: 3 | Status: SHIPPED | OUTPATIENT
Start: 2022-07-22 | End: 2022-08-12 | Stop reason: SDUPTHER

## 2022-07-22 NOTE — PROGRESS NOTES
Assessment and Plan:     Problem List Items Addressed This Visit        Endocrine    Type 2 diabetes mellitus with diabetic polyneuropathy, without long-term current use of insulin (HCC)    Relevant Medications    metFORMIN (GLUCOPHAGE) 500 mg tablet    Insulin Glargine Solostar (Lantus SoloStar) 100 UNIT/ML SOPN    Insulin Pen Needle 32G X 4 MM MISC    Type 2 diabetes mellitus with diabetic peripheral angiopathy without gangrene, without long-term current use of insulin (HCC)    Relevant Medications    metFORMIN (GLUCOPHAGE) 500 mg tablet    Insulin Glargine Solostar (Lantus SoloStar) 100 UNIT/ML SOPN       Cardiovascular and Mediastinum    Essential hypertension    Coronary artery disease involving native coronary artery of native heart without angina pectoris    Unstable angina ([facility] )     Pt seeing Cardio and will be getting cath after his renal numbers improve            Genitourinary    Stage 3a chronic kidney disease ([facility] )       Other    Mixed hyperlipidemia    Platelets decreased ([facility] )      Other Visit Diagnoses     Medicare annual wellness visit, subsequent    -  Primary    Screen for colon cancer        Relevant Orders    Ambulatory referral for colonoscopy    Screening for prostate cancer        Relevant Orders    PSA, Total Screen          Will cut metformin in half given kidney function   Insulin initiated - pt will call his bid numbers in in 7 days and I will further adjust insulin then      BMI Counseling: Body mass index is 31 71 kg/m²  The BMI is above normal  Nutrition recommendations include decreasing portion sizes  Exercise recommendations include moderate physical activity 150 minutes/week  No pharmacotherapy was ordered  Rationale for BMI follow-up plan is due to patient being overweight or obese  Depression Screening and Follow-up Plan: Patient was screened for depression during today's encounter  They screened negative with a PHQ-2 score of 0        Preventive health issues were discussed with patient, and age appropriate screening tests were ordered as noted in patient's After Visit Summary  Personalized health advice and appropriate referrals for health education or preventive services given if needed, as noted in patient's After Visit Summary  History of Present Illness:     Patient presents for a Medicare Wellness Visit    Pt is sched for an AWV and a follow up  Pt had labs    Pt states he recently went to see Cardio and was told their was some differences with his current EKG and previous one  Pt states cardio recommended that he goes for a cath - states he did not like his kidney function - so he was sent to nephrology  Pt states renal advised him that he would not do the cath till his kidney function improves  He decreased his water pill in half    Pt had his diabetic eye exam    Pt states his last colon he was 73 or so and was told that was his last one by GI  This was done in [address] [place] [address]       Patient Care Team:  Bonnie Fuentes DO as PCP - General (Family Medicine)  Sudhakar Quinn MD as Consulting Physician (Cardiology)  Christoph Longo MD as Consulting Physician (Ophthalmology)  Balwinder Tilley DPM as Consulting Physician (Podiatry)  Stefan Marcus MD as Consulting Physician (Vascular Surgery)     Review of Systems:     Review of Systems   Constitutional: Negative for activity change, appetite change, chills, diaphoresis, fatigue, fever and unexpected weight change  HENT: Negative for congestion, dental problem, ear pain, mouth sores, sinus pressure, sinus pain, sore throat and trouble swallowing  Eyes: Negative for photophobia, discharge and itching  Respiratory: Negative for apnea, chest tightness and shortness of breath  Cardiovascular: Positive for chest pain (on and off)  Negative for palpitations and leg swelling  Gastrointestinal: Negative for abdominal distention, abdominal pain, blood in stool, nausea and vomiting     Endocrine: Negative for cold intolerance, heat intolerance, polydipsia, polyphagia and polyuria  Genitourinary: Negative for difficulty urinating  Musculoskeletal: Negative for arthralgias  Skin: Negative for color change and wound  Neurological: Negative for dizziness, syncope, speech difficulty and headaches  Hematological: Negative for adenopathy  Psychiatric/Behavioral: Negative for agitation and behavioral problems          Problem List:     Patient Active Problem List   Diagnosis    Mixed hyperlipidemia    Essential hypertension    Coronary artery disease involving native coronary artery of native heart without angina pectoris    S/P angioplasty with stent    S/P CABG x 3    S/P AAA repair    S/P prostatectomy    H/O prostate cancer    Type 2 diabetes mellitus with diabetic polyneuropathy, without long-term current use of insulin ([facility]    Varicose veins of left lower extremity    History of endovascular stent graft for abdominal aortic aneurysm (AAA)    Bruit (arterial)    Peripheral artery disease ([facility]    Type 2 diabetes mellitus with diabetic peripheral angiopathy without gangrene, without long-term current use of insulin ([facility])    Actinic keratoses    Chest pain    Stage 3a chronic kidney disease ([facility] )    Unstable angina ([facility] )    Platelets decreased ([facility] )      Past Medical and Surgical History:     Past Medical History:   Diagnosis Date    CAD (coronary artery disease)     Cancer ([facility] )     prostate    CHF (congestive heart failure) ([facility]     Diabetes mellitus ([facility] )     Myocardial infarction [facility])      Past Surgical History:   Procedure Laterality Date    ADENOIDECTOMY      CARDIAC SURGERY  2002    3 cardiac bypass then angioplasty 7/2020    CHOLECYSTECTOMY      CORONARY ARTERY BYPASS GRAFT      PROSTATE SURGERY      TONSILLECTOMY        Family History:     Family History   Problem Relation Age of Onset    Diabetes Mother     Alcohol abuse Father     Mental illness Neg Hx       Social History: Social History     Socioeconomic History    Marital status: Unknown     Spouse name: None    Number of children: None    Years of education: None    Highest education level: None   Occupational History    None   Tobacco Use    Smoking status: Former Smoker     Types: Cigarettes     Quit date:      Years since quittin 5    Smokeless tobacco: Never Used   Vaping Use    Vaping Use: Never used   Substance and Sexual Activity    Alcohol use: Yes     Alcohol/week: 14 0 standard drinks     Types: 14 Cans of beer per week     Comment: 1 -2 daily    Drug use: Never    Sexual activity: None   Other Topics Concern    None   Social History Narrative    None     Social Determinants of Health     Financial Resource Strain: Not on file   Food Insecurity: Not on file   Transportation Needs: Not on file   Physical Activity: Not on file   Stress: Not on file   Social Connections: Not on file   Intimate Partner Violence: Not on file   Housing Stability: Not on file      Medications and Allergies:     Current Outpatient Medications   Medication Sig Dispense Refill    amLODIPine (NORVASC) 10 mg tablet Take 1 tablet (10 mg total) by mouth daily 90 tablet 1    aspirin 81 mg chewable tablet Chew 81 mg daily      atorvastatin (LIPITOR) 40 mg tablet Take 1 tablet (40 mg total) by mouth daily 90 tablet 3    Blood Glucose Monitoring Suppl (OneTouch Verio Reflect) w/Device KIT Check blood sugars twice daily  Please substitute with appropriate alternative as covered by patient's insurance   Dx: E11 65 1 kit 0    clopidogrel (PLAVIX) 75 mg tablet Take 75 mg by mouth daily      Empagliflozin (Jardiance) 25 MG TABS Take 1 tablet (25 mg total) by mouth every morning 90 tablet 1    fenofibrate 160 MG tablet Take 1 tablet (160 mg total) by mouth daily 90 tablet 3    gabapentin (NEURONTIN) 600 MG tablet TAKE 1 TABLET BY MOUTH  TWICE DAILY 180 tablet 3    glimepiride (AMARYL) 2 mg tablet Take 1 tablet (2 mg total) by mouth in the morning and 1 tablet (2 mg total) before bedtime   glucose blood (OneTouch Verio) test strip Check blood sugars twice daily  Please substitute with appropriate alternative as covered by patient's insurance  Dx: E11 65 200 each 3    hydrochlorothiazide (HYDRODIURIL) 12 5 mg tablet Take 1 tablet (12 5 mg total) by mouth daily 30 tablet 0    Insulin Glargine Solostar (Lantus SoloStar) 100 UNIT/ML SOPN Inject 10 Units under the skin every evening 15 mL 3    Insulin Pen Needle 32G X 4 MM MISC Use every evening 100 each 5    isosorbide mononitrate (IMDUR) 30 mg 24 hr tablet Take 1 tablet (30 mg total) by mouth daily 90 tablet 3    losartan (COZAAR) 100 MG tablet Take 1 tablet (100 mg total) by mouth daily 90 tablet 2    metFORMIN (GLUCOPHAGE) 500 mg tablet Take 1 tablet (500 mg total) by mouth 2 (two) times a day with meals 180 tablet 1    metoprolol succinate (TOPROL-XL) 50 mg 24 hr tablet Take 1 tablet (50 mg total) by mouth daily 90 tablet 3    Multiple Vitamins-Minerals (MULTIVITAMIN MEN 50+ PO) Take by mouth daily      Omega-3 Fatty Acids (fish oil) 1,000 mg Take 4,000 mg by mouth daily       OneTouch Delica Lancets 49H MISC Check blood sugars twice daily  Please substitute with appropriate alternative as covered by patient's insurance  Dx: E11 65 200 each 3    sitaGLIPtin (JANUVIA) 100 mg tablet Take 100 mg by mouth daily       No current facility-administered medications for this visit       Allergies   Allergen Reactions    Lisinopril Rash and Lip Swelling      Immunizations:     Immunization History   Administered Date(s) Administered    COVID-19 MODERNA VACC 0 5 ML IM 01/15/2021, 01/15/2021, 02/16/2021, 02/16/2021    INFLUENZA 09/16/2013, 10/14/2014, 11/02/2015, 10/04/2016, 10/01/2017, 11/12/2018, 12/09/2019, 09/15/2020    Influenza, high dose seasonal 0 7 mL 10/14/2021    Pneumococcal Conjugate 13-Valent 12/28/2015    Pneumococcal Polysaccharide PPV23 10/04/2016    Td (adult), Unspecified 04/29/2021    Zoster 01/01/2014      Health Maintenance:         Topic Date Due    Hepatitis C Screening  Completed         Topic Date Due    COVID-19 Vaccine (3 - Booster for Moderna series) 07/16/2021    Influenza Vaccine (1) 09/01/2022      Medicare Screening Tests and Risk Assessments:     Charm Lefort is here for his Subsequent Wellness visit  Last Medicare Wellness visit information reviewed, patient interviewed, no change since last AWV  Health Risk Assessment:   Patient rates overall health as good  Patient feels that their physical health rating is slightly worse  Patient is satisfied with their life  Eyesight was rated as same  Hearing was rated as same  Patient feels that their emotional and mental health rating is same  Patients states they are never, rarely angry  Patient states they are never, rarely unusually tired/fatigued  Pain experienced in the last 7 days has been none  Patient states that he has experienced no weight loss or gain in last 6 months  Depression Screening:   PHQ-2 Score: 0      Fall Risk Screening: In the past year, patient has experienced: no history of falling in past year      Home Safety:  Patient has trouble with stairs inside or outside of their home  Patient has working smoke alarms and has working carbon monoxide detector  Home safety hazards include: none  Nutrition:   Current diet is Regular and Limited junk food  Medications:   Patient is currently taking over-the-counter supplements  OTC medications include: see medication list  Patient is able to manage medications  Activities of Daily Living (ADLs)/Instrumental Activities of Daily Living (IADLs):   Walk and transfer into and out of bed and chair?: Yes  Dress and groom yourself?: Yes    Bathe or shower yourself?: Yes    Feed yourself?  Yes  Do your laundry/housekeeping?: No  Manage your money, pay your bills and track your expenses?: Yes  Make your own meals?: Yes    Do your own shopping?: Yes    Previous Hospitalizations:   Any hospitalizations or ED visits within the last 12 months?: Yes    How many hospitalizations have you had in the last year?: 1-2    Advance Care Planning:   Living will: Yes    Advanced directive: Yes      Cognitive Screening:   Provider or family/friend/caregiver concerned regarding cognition?: No    PREVENTIVE SCREENINGS      Cardiovascular Screening:    General: Screening Not Indicated, History Lipid Disorder, Risks and Benefits Discussed and Screening Current    Due for: Lipid Panel      Diabetes Screening:     General: Screening Not Indicated, History Diabetes, Risks and Benefits Discussed and Screening Current    Due for: Blood Glucose      Colorectal Cancer Screening:     General: Risks and Benefits Discussed    Due for: Colonoscopy - High Risk      Prostate Cancer Screening:    General: History Prostate Cancer, Screening Not Indicated and Risks and Benefits Discussed    Due for: PSA      Osteoporosis Screening:    General: Risks and Benefits Discussed and Patient Declines      Abdominal Aortic Aneurysm (AAA) Screening:    Risk factors include: tobacco use        General: Screening Not Indicated and History AAA      Lung Cancer Screening:     General: Screening Not Indicated      Hepatitis C Screening:    General: Screening Current    Screening, Brief Intervention, and Referral to Treatment (SBIRT)    Screening      AUDIT-C Screenin) How often did you have a drink containing alcohol in the past year? monthly or less  2) How many drinks did you have on a typical day when you were drinking in the past year?  1 to 2  3) How often did you have 6 or more drinks on one occasion in the past year? never    AUDIT-C Score: 1  Interpretation: Score 0-3 (male): Negative screen for alcohol misuse    Single Item Drug Screening:  How often have you used an illegal drug (including marijuana) or a prescription medication for non-medical reasons in the past year? never    Single Item Drug Screen Score: 0  Interpretation: Negative screen for possible drug use disorder    Brief Intervention  Alcohol & drug use screenings were reviewed  No concerns regarding substance use disorder identified  No exam data present     Physical Exam:     /70   Pulse 70   Temp 97 5 °F (36 4 °C)   Resp 16   Ht 6' 2" (1 88 m)   Wt 112 kg (247 lb)   SpO2 98%   BMI 31 71 kg/m²     Physical Exam  Constitutional:       Appearance: He is well-developed  HENT:      Head: Normocephalic and atraumatic  Right Ear: External ear normal       Left Ear: External ear normal       Nose: Nose normal    Eyes:      Conjunctiva/sclera: Conjunctivae normal       Pupils: Pupils are equal, round, and reactive to light  Neck:      Thyroid: No thyromegaly  Cardiovascular:      Rate and Rhythm: Normal rate and regular rhythm  Pulses: no weak pulses          Dorsalis pedis pulses are 2+ on the right side and 2+ on the left side  Posterior tibial pulses are 2+ on the right side and 2+ on the left side  Heart sounds: Normal heart sounds  Pulmonary:      Effort: Pulmonary effort is normal       Breath sounds: Normal breath sounds  No wheezing  Abdominal:      General: Bowel sounds are normal  There is no distension  Palpations: Abdomen is soft  There is no mass  Tenderness: There is no abdominal tenderness  There is no guarding  Musculoskeletal:         General: No tenderness  Injury:      Normal range of motion  Cervical back: Normal range of motion and neck supple  Feet:      Right foot:      Skin integrity: No ulcer, skin breakdown, erythema, warmth, callus or dry skin  Left foot:      Skin integrity: No ulcer, skin breakdown, erythema, warmth, callus or dry skin  Skin:     General: Skin is warm and dry  Capillary Refill: Capillary refill takes less than 2 seconds  Findings: No erythema     Neurological:      Mental Status: He is alert and oriented to person, place, and time  Psychiatric:         Behavior: Behavior normal          Thought Content: Thought content normal          Judgment: Judgment normal           Diabetic Foot Exam    Patient's shoes and socks removed  Right Foot/Ankle   Right Foot Inspection  Skin Exam: skin normal  Skin not intact, no dry skin, no warmth, no callus, no erythema, no maceration, no abnormal color, no pre-ulcer, no ulcer and no callus  Toe Exam: ROM and strength within normal limits  Sensory   Vibration: intact  Proprioception: intact  Monofilament testing: intact    Vascular  Capillary refills: < 3 seconds  The right DP pulse is 2+  The right PT pulse is 2+  Left Foot/Ankle  Left Foot Inspection  Skin Exam: skin normal  Skin not intact, no dry skin, no warmth, no erythema, no maceration, normal color, no pre-ulcer, no ulcer and no callus  Toe Exam: ROM and strength within normal limits  Sensory   Vibration: intact  Proprioception: intact  Monofilament testing: intact    Vascular  Capillary refills: < 3 seconds  The left DP pulse is 2+  The left PT pulse is 2+  Assign Risk Category  No deformity present  No loss of protective sensation  No weak pulses  Risk: 0    Recent Results (from the past 672 hour(s))   Hemoglobin A1C    Collection Time: 07/19/22  8:24 AM   Result Value Ref Range    Hemoglobin A1C 8 3 (H) Normal 3 8-5 6%; PreDiabetic 5 7-6 4%;  Diabetic >=6 5%; Glycemic control for adults with diabetes <7 0% %     mg/dl   Comprehensive metabolic panel    Collection Time: 07/19/22  8:24 AM   Result Value Ref Range    Sodium 141 135 - 147 mmol/L    Potassium 4 0 3 5 - 5 3 mmol/L    Chloride 109 (H) 96 - 108 mmol/L    CO2 28 21 - 32 mmol/L    ANION GAP 4 4 - 13 mmol/L    BUN 37 (H) 5 - 25 mg/dL    Creatinine 1 71 (H) 0 60 - 1 30 mg/dL    Glucose, Fasting 158 (H) 65 - 99 mg/dL    Calcium 9 2 8 3 - 10 1 mg/dL    AST 25 5 - 45 U/L    ALT 37 12 - 78 U/L    Alkaline Phosphatase 46 46 - 116 U/L    Total Protein 7 9 6 4 - 8 4 g/dL    Albumin 3 6 3 5 - 5 0 g/dL    Total Bilirubin 0 41 0 20 - 1 00 mg/dL    eGFR 37 ml/min/1 73sq m   Microalbumin / creatinine urine ratio    Collection Time: 07/19/22  8:24 AM   Result Value Ref Range    Creatinine, Ur 66 2 mg/dL    Microalbum  ,U,Random 48 6 (H) 0 0 - 20 0 mg/L    Microalb Creat Ratio 73 (H) 0 - 30 mg/g creatinine   Lipid Panel with Direct LDL reflex    Collection Time: 07/19/22  8:24 AM   Result Value Ref Range    Cholesterol 131 See Comment mg/dL    Triglycerides 115 See Comment mg/dL    HDL, Direct 46 >=40 mg/dL    LDL Calculated 62 0 - 100 mg/dL   POCT urine dip    Collection Time: 07/21/22  2:27 PM   Result Value Ref Range    LEUKOCYTE ESTERASE,UA neg     NITRITE,UA neg     SL AMB POCT UROBILINOGEN 0 2     POCT URINE PROTEIN neg      PH,UA 5     BLOOD,UA moderate     SPECIFIC GRAVITY,UA 1 020     KETONES,UA neeg     BILIRUBIN,UA neg     GLUCOSE, UA 2000>          Alessandra Jayda, DO

## 2022-07-22 NOTE — PATIENT INSTRUCTIONS

## 2022-07-25 ENCOUNTER — TELEPHONE (OUTPATIENT)
Dept: ADMINISTRATIVE | Facility: OTHER | Age: 77
End: 2022-07-25

## 2022-07-25 NOTE — TELEPHONE ENCOUNTER
----- Message from Shaka Murdock DO sent at 7/22/2022  8:45 AM EDT -----  Regarding: eye  Pt was seen by DR Dr Mei Esquivel and had eye exam in June    We need for chart

## 2022-07-25 NOTE — LETTER
Diabetic Eye Exam Form    Date Requested: 22  Patient: Ivett Damian  Patient : 1945   Referring Provider: Dash Squires DO    DIABETIC Eye Exam Date _______________________________    Type of Exam MUST be documented for Diabetic Eye Exams  Please CHECK ONE  Retinal Exam       Dilated Retinal Exam       OCT       Optomap-Iris Exam      Fundus Photography     Left Eye - Please check Retinopathy AND Type or No Retinopathy      Exam did show retinopathy    Exam did not show retinopathy         Mild     Proliferative           Moderate    Severe            None         Right Eye - Please check Retinopathy AND Type or No Retinopathy     Exam did show retinopathy    Exam did not show retinopathy         Mild     Proliferative        Moderate    Severe        None       Comments __________________________________________________________    Practice Providing Exam ______________________________________________    Exam Performed By (print name) _______________________________________      Provider Signature ___________________________________________________    These reports are needed for  compliance  Please fax this completed form and a copy of the Diabetic Eye Exam report to our office located at [place] [address] as soon as possible via [phone] nini [employer] [employer]: Phone [phone]  We thank you for your assistance in treating our mutual patient

## 2022-07-25 NOTE — LETTER
Diabetic Eye Exam Form    Date Requested: 22  Patient: Natalia Hoyos  Patient : 1945   Referring Provider: De San DO    DIABETIC Eye Exam Date _______________________________    Type of Exam MUST be documented for Diabetic Eye Exams  Please CHECK ONE  Retinal Exam       Dilated Retinal Exam       OCT       Optomap-Iris Exam      Fundus Photography     Left Eye - Please check Retinopathy AND Type or No Retinopathy      Exam did show retinopathy    Exam did not show retinopathy         Mild     Proliferative           Moderate    Severe            None         Right Eye - Please check Retinopathy AND Type or No Retinopathy     Exam did show retinopathy    Exam did not show retinopathy         Mild     Proliferative        Moderate    Severe        None       Comments __________________________________________________________    Practice Providing Exam ______________________________________________    Exam Performed By (print name) _______________________________________      Provider Signature ___________________________________________________    These reports are needed for  compliance  Please fax this completed form and a copy of the Diabetic Eye Exam report to our office located at [place] [address] as soon as possible via [phone] nini [place] [employer]: Phone [phone]  We thank you for your assistance in treating our mutual patient

## 2022-07-26 ENCOUNTER — OFFICE VISIT (OUTPATIENT)
Dept: FAMILY MEDICINE CLINIC | Facility: CLINIC | Age: 77
End: 2022-07-26
Payer: COMMERCIAL

## 2022-07-26 ENCOUNTER — HOSPITAL ENCOUNTER (OUTPATIENT)
Dept: NON INVASIVE DIAGNOSTICS | Facility: HOSPITAL | Age: 77
Discharge: HOME | End: 2022-07-26
Attending: INTERNAL MEDICINE
Payer: COMMERCIAL

## 2022-07-26 VITALS
SYSTOLIC BLOOD PRESSURE: 126 MMHG | BODY MASS INDEX: 31.73 KG/M2 | TEMPERATURE: 97.3 F | WEIGHT: 247.2 LBS | OXYGEN SATURATION: 99 % | HEIGHT: 74 IN | DIASTOLIC BLOOD PRESSURE: 78 MMHG | RESPIRATION RATE: 18 BRPM | HEART RATE: 60 BPM

## 2022-07-26 VITALS
WEIGHT: 249 LBS | SYSTOLIC BLOOD PRESSURE: 183 MMHG | HEART RATE: 57 BPM | DIASTOLIC BLOOD PRESSURE: 75 MMHG | BODY MASS INDEX: 31.95 KG/M2 | HEIGHT: 74 IN

## 2022-07-26 DIAGNOSIS — Z85.46 H/O PROSTATE CANCER: ICD-10-CM

## 2022-07-26 DIAGNOSIS — R31.0 GROSS HEMATURIA: Primary | ICD-10-CM

## 2022-07-26 DIAGNOSIS — I25.10 CORONARY ARTERY DISEASE INVOLVING NATIVE CORONARY ARTERY OF NATIVE HEART WITHOUT ANGINA PECTORIS: ICD-10-CM

## 2022-07-26 LAB
AORTIC ROOT: 3.6 CM
AORTIC VALVE MEAN VELOCITY: 17.3 M/S
APICAL FOUR CHAMBER EJECTION FRACTION: 37 %
AV AREA BY CONTINUOUS VTI: 1.8 CM2
AV AREA PEAK VELOCITY: 1.9 CM2
AV LVOT MEAN GRADIENT: 4 MMHG
AV LVOT PEAK GRADIENT: 8 MMHG
AV MEAN GRADIENT: 14 MMHG
AV PEAK GRADIENT: 27 MMHG
AV VALVE AREA: 1.81 CM2
AV VELOCITY RATIO: 0.54
DOP CALC AO PEAK VEL: 2.59 M/S
DOP CALC AO VTI: 51.43 CM
DOP CALC LVOT AREA: 3.46 CM2
DOP CALC LVOT DIAMETER: 2.1 CM
DOP CALC LVOT PEAK VEL VTI: 26.88 CM
DOP CALC LVOT PEAK VEL: 1.4 M/S
DOP CALC LVOT STROKE INDEX: 39.1 ML/M2
DOP CALC LVOT STROKE VOLUME: 93.05
E WAVE DECELERATION TIME: 209 MS
FRACTIONAL SHORTENING: 31 (ref 28–44)
INTERVENTRICULAR SEPTUM IN DIASTOLE (PARASTERNAL SHORT AXIS VIEW): 1.3 CM
INTERVENTRICULAR SEPTUM: 1.3 CM (ref 0.6–1.1)
LAAS-AP2: 24.2 CM2
LAAS-AP4: 19.7 CM2
LEFT ATRIUM AREA SYSTOLE SINGLE PLANE A4C: 18.8 CM2
LEFT ATRIUM SIZE: 5.2 CM
LEFT INTERNAL DIMENSION IN SYSTOLE: 3.6 CM (ref 2.1–4)
LEFT VENTRICLE DIASTOLIC VOLUME (MOD BIPLANE): 218 ML
LEFT VENTRICLE SYSTOLIC VOLUME (MOD BIPLANE): 145 ML
LEFT VENTRICULAR INTERNAL DIMENSION IN DIASTOLE: 5.2 CM (ref 3.5–6)
LEFT VENTRICULAR POSTERIOR WALL IN END DIASTOLE: 1.3 CM
LEFT VENTRICULAR STROKE VOLUME: 72 ML
LV EF: 34 %
LVSV (TEICH): 72 ML
MV E'TISSUE VEL-LAT: 8 CM/S
MV E'TISSUE VEL-SEP: 5 CM/S
MV PEAK A VEL: 1.19 M/S
MV PEAK E VEL: 89 CM/S
MV STENOSIS PRESSURE HALF TIME: 61 MS
MV VALVE AREA P 1/2 METHOD: 3.61
PV PEAK GRADIENT: 4 MMHG
RIGHT ATRIUM AREA SYSTOLE A4C: 17.8 CM2
RIGHT VENTRICLE ID DIMENSION: 3.6 CM
SL AMB  POCT GLUCOSE, UA: NORMAL
SL AMB LEUKOCYTE ESTERASE,UA: NORMAL
SL AMB POCT BILIRUBIN,UA: NORMAL
SL AMB POCT BLOOD,UA: NORMAL
SL AMB POCT CLARITY,UA: NORMAL
SL AMB POCT COLOR,UA: NORMAL
SL AMB POCT KETONES,UA: NORMAL
SL AMB POCT NITRITE,UA: NORMAL
SL AMB POCT PH,UA: 5.5
SL AMB POCT SPECIFIC GRAVITY,UA: 1.02
SL AMB POCT URINE PROTEIN: NORMAL
SL AMB POCT UROBILINOGEN: NORMAL
SL CV LEFT ATRIUM LENGTH A2C: 5.8 CM
SL CV PED ECHO LEFT VENTRICLE DIASTOLIC VOLUME (MOD BIPLANE) 2D: 127 ML
SL CV PED ECHO LEFT VENTRICLE SYSTOLIC VOLUME (MOD BIPLANE) 2D: 55 ML
TR MAX PG: 28 MMHG
TR PEAK VELOCITY: 2.6 M/S
TRICUSPID VALVE PEAK REGURGITATION VELOCITY: 2.62 M/S

## 2022-07-26 PROCEDURE — 93306 TTE W/DOPPLER COMPLETE: CPT

## 2022-07-26 PROCEDURE — 81003 URINALYSIS AUTO W/O SCOPE: CPT | Performed by: FAMILY MEDICINE

## 2022-07-26 PROCEDURE — 1160F RVW MEDS BY RX/DR IN RCRD: CPT | Performed by: FAMILY MEDICINE

## 2022-07-26 PROCEDURE — 3078F DIAST BP <80 MM HG: CPT | Performed by: FAMILY MEDICINE

## 2022-07-26 PROCEDURE — 93306 TTE W/DOPPLER COMPLETE: CPT | Performed by: INTERNAL MEDICINE

## 2022-07-26 PROCEDURE — 99214 OFFICE O/P EST MOD 30 MIN: CPT | Performed by: FAMILY MEDICINE

## 2022-07-26 PROCEDURE — 3074F SYST BP LT 130 MM HG: CPT | Performed by: FAMILY MEDICINE

## 2022-07-26 NOTE — PROGRESS NOTES
Assessment/Plan:    No problem-specific Assessment & Plan notes found for this encounter  Gross hematuria  ua neg for infection  R/o stone vs renal pathology vs hemorrhagic cystitis vs bladder pathology  CT ordered  Urologist in future pending result  Continue meds     Diagnoses and all orders for this visit:    Gross hematuria  -     CT abdomen pelvis wo contrast; Future  -     Ambulatory referral to Urology; Future  -     POCT urine dip auto non-scope    H/O prostate cancer        Return if symptoms worsen or fail to improve  Subjective:      Patient ID: Linda Youssef is a 76 y o  male  Chief Complaint   Patient presents with    Blood in Urine     Started this morning nm  lpn       HPI  Since this afternoon around 3pm  Dark urine  Saw streaks of red blood  Rest of urine is orange  No burning  No pain  On plavix and asa  1y ago similar but worse and resolved on own after few days, no evaluation    Diabetic  Sees nephrologist   Creat 1 71 recently    No unexplained wt loss  Retired from industrial job, 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  Ex smoker, quit 30y ago  No hx of recurrent radiation to abdomen  No nsaid overuse  Hx of kidney stone  Hx of prostate CA, hx of prostatectomy, at [facility], stopped f/u there a few years ago    The following portions of the patient's history were reviewed and updated as appropriate: allergies, current medications, past family history, past medical history, past social history, past surgical history and problem list     Review of Systems   Constitutional: Negative for fever  Genitourinary: Positive for hematuria  Negative for dysuria           Current Outpatient Medications   Medication Sig Dispense Refill    amLODIPine (NORVASC) 10 mg tablet Take 1 tablet (10 mg total) by mouth daily 90 tablet 1    aspirin 81 mg chewable tablet Chew 81 mg daily      atorvastatin (LIPITOR) 40 mg tablet Take 1 tablet (40 mg total) by mouth daily 90 tablet 3    Blood Glucose Monitoring Suppl (OneTouch Verio Reflect) w/Device KIT Check blood sugars twice daily  Please substitute with appropriate alternative as covered by patient's insurance  Dx: E11 65 1 kit 0    clopidogrel (PLAVIX) 75 mg tablet Take 75 mg by mouth daily      Empagliflozin (Jardiance) 25 MG TABS Take 1 tablet (25 mg total) by mouth every morning 90 tablet 1    fenofibrate 160 MG tablet Take 1 tablet (160 mg total) by mouth daily 90 tablet 3    gabapentin (NEURONTIN) 600 MG tablet TAKE 1 TABLET BY MOUTH  TWICE DAILY 180 tablet 3    glimepiride (AMARYL) 2 mg tablet Take 1 tablet (2 mg total) by mouth in the morning and 1 tablet (2 mg total) before bedtime   glucose blood (OneTouch Verio) test strip Check blood sugars twice daily  Please substitute with appropriate alternative as covered by patient's insurance  Dx: E11 65 200 each 3    hydrochlorothiazide (HYDRODIURIL) 12 5 mg tablet Take 1 tablet (12 5 mg total) by mouth daily 30 tablet 0    Insulin Glargine Solostar (Lantus SoloStar) 100 UNIT/ML SOPN Inject 10 Units under the skin every evening 15 mL 3    Insulin Pen Needle 32G X 4 MM MISC Use every evening 100 each 5    isosorbide mononitrate (IMDUR) 30 mg 24 hr tablet Take 1 tablet (30 mg total) by mouth daily 90 tablet 3    losartan (COZAAR) 100 MG tablet Take 1 tablet (100 mg total) by mouth daily 90 tablet 2    metFORMIN (GLUCOPHAGE) 500 mg tablet Take 1 tablet (500 mg total) by mouth 2 (two) times a day with meals 180 tablet 1    metoprolol succinate (TOPROL-XL) 50 mg 24 hr tablet Take 1 tablet (50 mg total) by mouth daily 90 tablet 3    Multiple Vitamins-Minerals (MULTIVITAMIN MEN 50+ PO) Take by mouth daily      Omega-3 Fatty Acids (fish oil) 1,000 mg Take 4,000 mg by mouth daily       OneTouch Delica Lancets 36S MISC Check blood sugars twice daily  Please substitute with appropriate alternative as covered by patient's insurance   Dx: E11 65 200 each 3    sitaGLIPtin (JANUVIA) 100 mg tablet Take 100 mg by mouth daily       No current facility-administered medications for this visit  Objective:    /78   Pulse 60   Temp (!) 97 3 °F (36 3 °C)   Resp 18   Ht 6' 2" (1 88 m)   Wt 112 kg (247 lb 3 2 oz)   SpO2 99%   BMI 31 74 kg/m²        Physical Exam  Vitals and nursing note reviewed  Constitutional:       General: He is not in acute distress  Appearance: He is well-developed  He is not ill-appearing  HENT:      Head: Normocephalic  Right Ear: External ear normal       Left Ear: External ear normal    Eyes:      General: No scleral icterus  Conjunctiva/sclera: Conjunctivae normal    Cardiovascular:      Rate and Rhythm: Normal rate and regular rhythm  Heart sounds: No murmur heard  Pulmonary:      Effort: Pulmonary effort is normal  No respiratory distress  Breath sounds: No wheezing  Abdominal:      General: There is no distension  Palpations: Abdomen is soft  Tenderness: There is no abdominal tenderness  There is no right CVA tenderness or left CVA tenderness  Musculoskeletal:         General: No deformity  Cervical back: Neck supple  Skin:     General: Skin is warm and dry  Coloration: Skin is not pale  Neurological:      Mental Status: He is alert  Motor: No weakness  Gait: Gait normal    Psychiatric:         Mood and Affect: Mood normal          Behavior: Behavior normal          Thought Content:  Thought content normal                 Mallorie Sides, DO

## 2022-07-26 NOTE — TELEPHONE ENCOUNTER
Upon review of the In Basket request and the patient's chart, initial outreach has been made via fax, please see Contacts section for details       Thank you  Janak Johnson, MA

## 2022-07-27 ENCOUNTER — TELEPHONE (OUTPATIENT)
Dept: CARDIOLOGY CLINIC | Facility: CLINIC | Age: 77
End: 2022-07-27

## 2022-07-27 ENCOUNTER — TELEPHONE (OUTPATIENT)
Dept: UROLOGY | Facility: AMBULATORY SURGERY CENTER | Age: 77
End: 2022-07-27

## 2022-07-27 NOTE — TELEPHONE ENCOUNTER
----- Message from Nolan Miller MD sent at 7/26/2022  4:05 PM EDT -----  Please call and inform the patient that the Echocardiogram showed normal pumping function of the heart  No significant valve abnormality was seen    Will discuss further at next office visit

## 2022-07-27 NOTE — TELEPHONE ENCOUNTER
Patient to see PCP for hematuria  Per their notes    "HPI  Since this afternoon around 3pm  Dark urine  Saw streaks of red blood  Rest of urine is orange  No burning  No pain  On plavix and asa  1y ago similar but worse and resolved on own after few days, no evaluation"    Did order CT scan  Urine dip negative for infection

## 2022-07-27 NOTE — TELEPHONE ENCOUNTER
Complaint/Diagnosis:Gross Hematuria    Insurance:[employer] PPO    History of cancer:prostate    Previous urologist:[facility]    Outside Testing/where:epic[facility]care everywhere [facility]    If yes,what kind:epic    Records requested/where:epic[facility]care [facility]    Preferred location:[place]

## 2022-07-28 ENCOUNTER — TELEPHONE (OUTPATIENT)
Dept: CARDIOLOGY CLINIC | Facility: CLINIC | Age: 77
End: 2022-07-28

## 2022-07-28 NOTE — TELEPHONE ENCOUNTER
Called and left vm advising patient we would like him to schedule and get his CT scan first and then see us  Asked him to schedule CT and then call back with scheduled time and we would schedule him there after   Office number provided

## 2022-07-28 NOTE — TELEPHONE ENCOUNTER
Patient called back, gave him central scheduling's number and he will call back once that CT is scheduled

## 2022-07-28 NOTE — TELEPHONE ENCOUNTER
Holding off on the cath until Nephrology clears him  ----- Message from Chi Salgado MD sent at 7/28/2022 11:11 AM EDT -----  Regarding: RE: Cath  Hold off till cleared by Nephrology  ----- Message -----  From: Delia Collet, MA  Sent: 7/27/2022  11:43 AM EDT  To: May Lopez MA, Cat Blas MA, #  Subject: Cath                                             Good Afternoon,    Im a surgery Coordinator for Cardiology and wanted to know the status for Ingrid Simmons  I see that Nephrology wants to hold off on the Cath due to his Kidney function is getting worse  Just want to know should we continue to schedule or hold off for now?     Thank you       Delia Collet  Surgery Coordinator   [phone]

## 2022-07-29 NOTE — TELEPHONE ENCOUNTER
I called and spoke to patient  He thought he had an appointment on Sept 1 @ 10:15 with Dr Henrique Kimbrough but I do not see that appointment scheduled  I will talk to the clinical staff and see if he was meant to be put there  If not, I told patient I will call him back and make him a new appointment

## 2022-08-02 ENCOUNTER — HOSPITAL ENCOUNTER (OUTPATIENT)
Dept: RADIOLOGY | Facility: HOSPITAL | Age: 77
Discharge: HOME | End: 2022-08-02
Attending: FAMILY MEDICINE
Payer: COMMERCIAL

## 2022-08-02 DIAGNOSIS — R31.0 GROSS HEMATURIA: ICD-10-CM

## 2022-08-02 PROCEDURE — 74176 CT ABD & PELVIS W/O CONTRAST: CPT

## 2022-08-03 NOTE — TELEPHONE ENCOUNTER
As a follow-up, a second attempt has been made for outreach via fax, please see Contacts section for details      Thank you  Basia Dunaway MA

## 2022-08-04 NOTE — TELEPHONE ENCOUNTER
Upon review of the In Basket request we were able to locate, review, and update the patient chart as requested for Diabetic Eye Exam     Any additional questions or concerns should be emailed to the Practice Liaisons via [email or web address] email, please do not reply via In Basket      Thank you  Alva Bliss MA

## 2022-08-05 ENCOUNTER — TELEPHONE (OUTPATIENT)
Dept: FAMILY MEDICINE CLINIC | Facility: CLINIC | Age: 77
End: 2022-08-05

## 2022-08-05 DIAGNOSIS — E11.42 TYPE 2 DIABETES MELLITUS WITH DIABETIC POLYNEUROPATHY, WITHOUT LONG-TERM CURRENT USE OF INSULIN (HCC): ICD-10-CM

## 2022-08-05 NOTE — TELEPHONE ENCOUNTER
Patient called stating that he was told to track his BS readings throughout the week and let Dr Edmund Martin know  PM readings are at bedtime when he gave his injection, AM is the following morning  Patient stated that for some of the days he may have given the injection incorrectly, he is not sure       7/29        7/30        7/31    8/1    8/2    8/3    8/4      Bambi Curling, LPN

## 2022-08-12 RX ORDER — INSULIN GLARGINE 100 [IU]/ML
20 INJECTION, SOLUTION SUBCUTANEOUS EVERY EVENING
Qty: 15 ML | Refills: 3
Start: 2022-08-12 | End: 2022-09-13 | Stop reason: SDUPTHER

## 2022-08-12 NOTE — TELEPHONE ENCOUNTER
Pt can increase his lantus to 20 units and give me more data in a week   I fixed script in the chart

## 2022-08-13 NOTE — TELEPHONE ENCOUNTER
Pt will increase the Lantus to 20 Units  Pt will call with BS readings on 08/18/2022 reporting to Dr Obie Magdaleno for further instructions if needed    Pt will call the Office if there is any other concerns  bridger

## 2022-08-22 DIAGNOSIS — E11.42 TYPE 2 DIABETES MELLITUS WITH DIABETIC POLYNEUROPATHY, WITHOUT LONG-TERM CURRENT USE OF INSULIN (HCC): ICD-10-CM

## 2022-08-22 RX ORDER — GLIMEPIRIDE 2 MG/1
2 TABLET ORAL 2 TIMES DAILY
Qty: 60 TABLET | Refills: 0 | Status: SHIPPED | OUTPATIENT
Start: 2022-08-22 | End: 2022-08-22 | Stop reason: SDUPTHER

## 2022-08-22 RX ORDER — GLIMEPIRIDE 2 MG/1
2 TABLET ORAL 2 TIMES DAILY
Qty: 180 TABLET | Refills: 0 | OUTPATIENT
Start: 2022-08-22

## 2022-08-22 RX ORDER — GLIMEPIRIDE 2 MG/1
2 TABLET ORAL 2 TIMES DAILY
Qty: 180 TABLET | Refills: 1 | Status: SHIPPED | OUTPATIENT
Start: 2022-08-22

## 2022-08-22 NOTE — TELEPHONE ENCOUNTER
I sent both but you may want to let him know when the computer sends both sometimes one gets automatically cancelled

## 2022-08-22 NOTE — TELEPHONE ENCOUNTER
He wants both mail order and a short term to his local pharmacy   He would like a call back to let him know when this is done Luiz To

## 2022-08-23 NOTE — TELEPHONE ENCOUNTER
I called [facility] and gave her the verbal for #60   And Laura Beard is aware that this was called in  [facility]

## 2022-09-05 DIAGNOSIS — I10 ESSENTIAL HYPERTENSION: ICD-10-CM

## 2022-09-05 DIAGNOSIS — Z95.828 HISTORY OF ENDOVASCULAR STENT GRAFT FOR ABDOMINAL AORTIC ANEURYSM (AAA): ICD-10-CM

## 2022-09-06 RX ORDER — LOSARTAN POTASSIUM 100 MG/1
100 TABLET ORAL DAILY
Qty: 90 TABLET | Refills: 0 | Status: SHIPPED | OUTPATIENT
Start: 2022-09-06

## 2022-09-13 DIAGNOSIS — E11.42 TYPE 2 DIABETES MELLITUS WITH DIABETIC POLYNEUROPATHY, WITHOUT LONG-TERM CURRENT USE OF INSULIN (HCC): ICD-10-CM

## 2022-09-13 RX ORDER — INSULIN GLARGINE 100 [IU]/ML
20 INJECTION, SOLUTION SUBCUTANEOUS EVERY EVENING
Qty: 15 ML | Refills: 3 | Status: SHIPPED | OUTPATIENT
Start: 2022-09-13 | End: 2023-03-12

## 2022-09-13 RX ORDER — BLOOD SUGAR DIAGNOSTIC
STRIP MISCELLANEOUS
Qty: 200 EACH | Refills: 3 | Status: SHIPPED | OUTPATIENT
Start: 2022-09-13

## 2022-09-16 ENCOUNTER — OFFICE VISIT (OUTPATIENT)
Dept: PODIATRY | Facility: CLINIC | Age: 77
End: 2022-09-16
Payer: COMMERCIAL

## 2022-09-16 VITALS — WEIGHT: 249 LBS | BODY MASS INDEX: 31.95 KG/M2 | HEIGHT: 74 IN

## 2022-09-16 DIAGNOSIS — M79.671 PAIN IN BOTH FEET: ICD-10-CM

## 2022-09-16 DIAGNOSIS — B35.1 ONYCHOMYCOSIS: ICD-10-CM

## 2022-09-16 DIAGNOSIS — I70.209 PERIPHERAL ARTERIOSCLEROSIS (HCC): ICD-10-CM

## 2022-09-16 DIAGNOSIS — E11.42 DIABETIC POLYNEUROPATHY ASSOCIATED WITH TYPE 2 DIABETES MELLITUS (HCC): Primary | ICD-10-CM

## 2022-09-16 DIAGNOSIS — M79.672 PAIN IN BOTH FEET: ICD-10-CM

## 2022-09-16 PROCEDURE — 11721 DEBRIDE NAIL 6 OR MORE: CPT | Performed by: PODIATRIST

## 2022-09-16 NOTE — PROGRESS NOTES
Assessment/Plan:  Metatarsalgia secondary to diabetic neuropathy   Mycosis of nail and skin   Radiculopathy   Pain upon ambulation   Mild peripheral artery disease      Plan   Patient educated on condition   All nails debrided without pain or complication   Patient remain on gabapentin as directed          Diagnoses and all orders for this visit:     Radiculopathy of lumbar region  -     gabapentin (NEURONTIN) 600 MG tablet; Take 1 tablet (600 mg total) by mouth 2 (two) times a day     Dermatophytosis     Tinea pedis of both feet     Diabetic polyneuropathy associated with type 2 diabetes mellitus ([address] )  -     gabapentin (NEURONTIN) 600 MG tablet; Take 1 tablet (600 mg total) by mouth 2 (two) times a day     Onychomycosis     Peripheral arteriosclerosis (HCC)     Pain in both feet     Metatarsalgia of both feet  -     gabapentin (NEURONTIN) 600 MG tablet;  Take 1 tablet (600 mg total) by mouth 2 (two) times a day            Subjective:  Patient is doing better on present gabapentin regimen   He complains of pain in his toes with ambulation and shoe wear   No history of trauma             Allergies   Allergen Reactions    Lisinopril Rash and Lip Swelling            Current Outpatient Medications:     amLODIPine (NORVASC) 10 mg tablet, Take 1 tablet (10 mg total) by mouth daily, Disp: 90 tablet, Rfl: 1    aspirin 81 mg chewable tablet, Chew 81 mg daily, Disp: , Rfl:     atorvastatin (LIPITOR) 40 mg tablet, Take 40 mg by mouth daily, Disp: , Rfl:     canagliflozin (Invokana) 100 mg, Take 100 mg by mouth daily before breakfast , Disp: , Rfl:     clopidogrel (PLAVIX) 75 mg tablet, Take 75 mg by mouth daily, Disp: , Rfl:     gabapentin (NEURONTIN) 600 MG tablet, Take 1 tablet (600 mg total) by mouth 2 (two) times a day, Disp: 180 tablet, Rfl: 0    glimepiride (AMARYL) 2 mg tablet, Take 2 mg by mouth every morning before breakfast, Disp: , Rfl:     hydrochlorothiazide (HYDRODIURIL) 25 mg tablet, Take 25 mg by mouth daily, Disp: , Rfl:     losartan (COZAAR) 50 mg tablet, Take 2 tablets (100 mg total) by mouth daily, Disp: 90 tablet, Rfl: 3    metFORMIN (GLUCOPHAGE) 1000 MG tablet, Take 1 tablet (1,000 mg total) by mouth 2 (two) times a day with meals, Disp: 180 tablet, Rfl: 0    metoprolol succinate (TOPROL-XL) 50 mg 24 hr tablet, Take 1 tablet (50 mg total) by mouth daily, Disp: 90 tablet, Rfl: 3    Multiple Vitamins-Minerals (MULTIVITAMIN MEN 50+ PO), Take by mouth daily, Disp: , Rfl:     Omega-3 Fatty Acids (fish oil) 1,000 mg, Take 4,000 mg by mouth daily , Disp: , Rfl:     sitaGLIPtin (JANUVIA) 100 mg tablet, Take 100 mg by mouth daily, Disp: , Rfl:            Patient Active Problem List   Diagnosis    Mixed hyperlipidemia    Essential hypertension    Coronary artery disease involving native coronary artery of native heart without angina pectoris    S/P angioplasty with stent    S/P CABG x 3    S/P AAA repair    S/P prostatectomy    H/O prostate cancer    Type 2 diabetes mellitus with diabetic polyneuropathy, without long-term current use of insulin ([facility])    Varicose veins of left lower extremity    History of endovascular stent graft for abdominal aortic aneurysm (AAA)    Bruit (arterial)    Peripheral artery disease ([facility] )             Patient ID: Thomas Horne is a 77 y  o  male      HPI     The following portions of the patient's history were reviewed and updated as appropriate:      family history includes Alcohol abuse in his father; Diabetes in his mother        reports that he quit smoking about 34 years ago  His smoking use included cigarettes  He has never used smokeless tobacco  He reports current alcohol use of about 14 0 standard drinks of alcohol per week   He reports that he does not use drugs         Objective:  Patient's shoes and socks removed    Foot ExamPhysical Exam          General  General Appearance: appears stated age and healthy   Orientation: alert and oriented to person, place, and time   Affect: appropriate   Gait: antalgic         Right Foot/Ankle      Inspection and Palpation  Tenderness: metatarsals   Swelling: none   Arch: pes planus  Claw Toes: fifth toe  Hallux limitus: yes  Skin Exam: callus, dry skin and tinea;      Neurovascular  Dorsalis pedis: 1+  Posterior tibial: 1+  Saphenous nerve sensation: absent  Tibial nerve sensation: absent  Superficial peroneal nerve sensation: absent  Deep peroneal nerve sensation: absent  Sural nerve sensation: absent        Left Foot/Ankle       Inspection and Palpation  Tenderness: metatarsals   Swelling: none   Arch: pes planus  Claw toes: second toe and fifth toe  Hallux limitus: yes  Skin Exam: callus, dry skin and tinea;      Neurovascular  Dorsalis pedis: 1+  Posterior tibial: 1+  Saphenous nerve sensation: absent  Tibial nerve sensation: absent  Superficial peroneal nerve sensation: absent  Deep peroneal nerve sensation: absent  Sural nerve sensation: absent           Physical Exam  Vitals signs and nursing note reviewed  Cardiovascular:      Rate and Rhythm: Normal rate and regular rhythm       Pulses:           Dorsalis pedis pulses are 1+ on the right side and 1+ on the left side         Posterior tibial pulses are 1+ on the right side and 1+ on the left side  Feet:      Right foot:      Skin integrity: Callus and dry skin present       Left foot:      Skin integrity: Callus and dry skin present  Skin:     Capillary Refill: Capillary refill takes 2 to 3 seconds       Comments:   All nails are thick and mycotic   Patient demonstrates bilateral dermatophytosis  Chucky Hanston is a pre ulcerative/ corn on the plantar aspect 2nd left toe     Right Foot/Ankle   Right Foot Inspection  Skin Exam: dry skin, callus and callus                                 Vascular     The right DP pulse is 1+  The right PT pulse is 1+     Right Toe  - Comprehensive Exam  Arch: pes planus  Claw Toes: fifth toe  Hallux limitus: yes  Swelling: none   Tenderness: metatarsals            Left Foot/Ankle  Left Foot Inspection  Skin Exam: dry skin and callus                                             Vascular     The left DP pulse is 1+  The left PT pulse is 1+  Left Toe  - Comprehensive Exam  Arch: pes planus  Claw toes: second toe and fifth toe  Hallux limitus: yes  Swelling: none   Tenderness: metatarsals      Patient's shoes and socks removed  Assign Risk Category:  Deformity present; Loss of protective sensation; Weak pulses       Risk: 2

## 2022-10-07 ENCOUNTER — TELEPHONE (OUTPATIENT)
Dept: CARDIOLOGY CLINIC | Facility: CLINIC | Age: 77
End: 2022-10-07

## 2022-10-07 ENCOUNTER — TELEPHONE (OUTPATIENT)
Dept: NEPHROLOGY | Facility: CLINIC | Age: 77
End: 2022-10-07

## 2022-10-07 NOTE — TELEPHONE ENCOUNTER
Patient has been scheduled for a left heart cath :    Date: 10/19/2022  Time 8 am  [facility] : [facility]  Dr Susan Flores performing procedure    Insurance: [employer]

## 2022-10-07 NOTE — TELEPHONE ENCOUNTER
Receive a called from patient he stated he got a call from his cardiologist that he has clear for cardiac catheretization  He would like to know if Guillermina Keyes can order blood work for him to have done prior  as he is concerned for his kidneys

## 2022-10-07 NOTE — TELEPHONE ENCOUNTER
Hi Dr Bell,    I'm following up on this message to see if Nephrology dept has cleared patient to be scheduled for a Left Heart CATH  Please advise         Thank you,   Leroy Nuñez  Surgery Coordinator  [place] [address] [facility]   [address] [place] [address], [address]  Ph: [phone]

## 2022-10-10 ENCOUNTER — TELEPHONE (OUTPATIENT)
Dept: FAMILY MEDICINE CLINIC | Facility: CLINIC | Age: 77
End: 2022-10-10

## 2022-10-10 ENCOUNTER — TELEPHONE (OUTPATIENT)
Dept: NEPHROLOGY | Facility: CLINIC | Age: 77
End: 2022-10-10

## 2022-10-10 ENCOUNTER — APPOINTMENT (OUTPATIENT)
Dept: LAB | Facility: CLINIC | Age: 77
End: 2022-10-10
Payer: COMMERCIAL

## 2022-10-10 DIAGNOSIS — N17.9 AKI (ACUTE KIDNEY INJURY) (HCC): ICD-10-CM

## 2022-10-10 DIAGNOSIS — N18.30 BENIGN HYPERTENSION WITH CKD (CHRONIC KIDNEY DISEASE) STAGE III (HCC): ICD-10-CM

## 2022-10-10 DIAGNOSIS — I12.9 BENIGN HYPERTENSION WITH CKD (CHRONIC KIDNEY DISEASE) STAGE III (HCC): ICD-10-CM

## 2022-10-10 DIAGNOSIS — E11.42 TYPE 2 DIABETES MELLITUS WITH DIABETIC POLYNEUROPATHY, WITHOUT LONG-TERM CURRENT USE OF INSULIN (HCC): Primary | ICD-10-CM

## 2022-10-10 DIAGNOSIS — Z12.5 SCREENING FOR PROSTATE CANCER: ICD-10-CM

## 2022-10-10 DIAGNOSIS — N18.31 STAGE 3A CHRONIC KIDNEY DISEASE (HCC): ICD-10-CM

## 2022-10-10 DIAGNOSIS — E11.42 TYPE 2 DIABETES MELLITUS WITH DIABETIC POLYNEUROPATHY, WITHOUT LONG-TERM CURRENT USE OF INSULIN (HCC): ICD-10-CM

## 2022-10-10 DIAGNOSIS — R80.9 MICROALBUMINURIA: ICD-10-CM

## 2022-10-10 LAB
ANION GAP SERPL CALCULATED.3IONS-SCNC: 4 MMOL/L (ref 4–13)
BUN SERPL-MCNC: 35 MG/DL (ref 5–25)
CALCIUM SERPL-MCNC: 9.5 MG/DL (ref 8.3–10.1)
CHLORIDE SERPL-SCNC: 106 MMOL/L (ref 96–108)
CO2 SERPL-SCNC: 27 MMOL/L (ref 21–32)
CREAT SERPL-MCNC: 1.44 MG/DL (ref 0.6–1.3)
CREAT UR-MCNC: 77.1 MG/DL
ERYTHROCYTE [DISTWIDTH] IN BLOOD BY AUTOMATED COUNT: 13.2 % (ref 11.6–15.1)
GFR SERPL CREATININE-BSD FRML MDRD: 45 ML/MIN/1.73SQ M
GLUCOSE P FAST SERPL-MCNC: 97 MG/DL (ref 65–99)
HCT VFR BLD AUTO: 45.6 % (ref 36.5–49.3)
HGB BLD-MCNC: 14.5 G/DL (ref 12–17)
MCH RBC QN AUTO: 28 PG (ref 26.8–34.3)
MCHC RBC AUTO-ENTMCNC: 31.8 G/DL (ref 31.4–37.4)
MCV RBC AUTO: 88 FL (ref 82–98)
MICROALBUMIN UR-MCNC: 43.4 MG/L (ref 0–20)
MICROALBUMIN/CREAT 24H UR: 56 MG/G CREATININE (ref 0–30)
PHOSPHATE SERPL-MCNC: 3.1 MG/DL (ref 2.3–4.1)
PLATELET # BLD AUTO: 208 THOUSANDS/UL (ref 149–390)
PMV BLD AUTO: 11.3 FL (ref 8.9–12.7)
POTASSIUM SERPL-SCNC: 4 MMOL/L (ref 3.5–5.3)
PSA SERPL-MCNC: <0.1 NG/ML (ref 0–4)
PTH-INTACT SERPL-MCNC: 20.9 PG/ML (ref 18.4–80.1)
RBC # BLD AUTO: 5.17 MILLION/UL (ref 3.88–5.62)
SODIUM SERPL-SCNC: 137 MMOL/L (ref 135–147)
WBC # BLD AUTO: 8.86 THOUSAND/UL (ref 4.31–10.16)

## 2022-10-10 PROCEDURE — 83970 ASSAY OF PARATHORMONE: CPT

## 2022-10-10 PROCEDURE — 80048 BASIC METABOLIC PNL TOTAL CA: CPT

## 2022-10-10 PROCEDURE — G0103 PSA SCREENING: HCPCS

## 2022-10-10 PROCEDURE — 83036 HEMOGLOBIN GLYCOSYLATED A1C: CPT

## 2022-10-10 PROCEDURE — 82043 UR ALBUMIN QUANTITATIVE: CPT

## 2022-10-10 PROCEDURE — 85027 COMPLETE CBC AUTOMATED: CPT

## 2022-10-10 PROCEDURE — 36415 COLL VENOUS BLD VENIPUNCTURE: CPT

## 2022-10-10 PROCEDURE — 82570 ASSAY OF URINE CREATININE: CPT

## 2022-10-10 PROCEDURE — 84100 ASSAY OF PHOSPHORUS: CPT

## 2022-10-10 NOTE — TELEPHONE ENCOUNTER
----- Message from Renny Jones, [address] Dara Waller sent at 10/10/2022  3:12 PM EDT -----  Please let patient know renal function remains stable  m no change in current treatment    Maintain upcoming appointment with Nephrology

## 2022-10-10 NOTE — TELEPHONE ENCOUNTER
Lab called stating that they have the patient in the lab currently  The patient is wondering if Dr Bola Viveros would like to add any labs to get done for his upcoming appointment  Spoke to Dr Bola Viveros, and he would like to add A1C and Microalbumin  Informed lab, please add orders    Victor Manuel Phillips LPN

## 2022-10-10 NOTE — TELEPHONE ENCOUNTER
Spoke with patient and advised: Please let patient know renal function remains stable  m no change in current treatment   Maintain upcoming appointment with Nephrology

## 2022-10-10 NOTE — TELEPHONE ENCOUNTER
--- Message -----  From: Oly Mcfarland MD  Sent: 10/7/2022   1:14 PM EDT  To: Michael Meadows, we will schedule the procedure

## 2022-10-11 ENCOUNTER — TELEPHONE (OUTPATIENT)
Dept: CARDIOLOGY CLINIC | Facility: CLINIC | Age: 77
End: 2022-10-11

## 2022-10-11 LAB
EST. AVERAGE GLUCOSE BLD GHB EST-MCNC: 157 MG/DL
HBA1C MFR BLD: 7.1 %

## 2022-10-11 NOTE — TELEPHONE ENCOUNTER
Good Morning : Cardiology would like to confirm that this patient is cleared by you to proceed with his cath  Blood work results are in from recent draw

## 2022-10-14 NOTE — TELEPHONE ENCOUNTER
Both tests have been resulted  Let me know if you would like me to call patient with any results    Jose Dougherty

## 2022-10-18 ENCOUNTER — RA CDI HCC (OUTPATIENT)
Dept: OTHER | Facility: HOSPITAL | Age: 77
End: 2022-10-18

## 2022-10-18 ENCOUNTER — TELEPHONE (OUTPATIENT)
Dept: CARDIOLOGY CLINIC | Facility: CLINIC | Age: 77
End: 2022-10-18

## 2022-10-18 PROCEDURE — NC001 PR NO CHARGE: Performed by: INTERNAL MEDICINE

## 2022-10-18 NOTE — PROGRESS NOTES
[facility]  coding opportunities          Chart Reviewed number of suggestions sent to Provider: 1   E11 22, [id number]      Patients Insurance     Medicare Insurance: [employer]vantage

## 2022-10-18 NOTE — H&P
H&P Exam - Cardiology   Marisela Zamarripa 76 y o  male MRN: [id number]  Unit/Bed#:  Encounter: [id number]     Office cardiologist: Nahed Kelley MD     PCP: Sadia Restrepo, DO       Assessment/Plan   Abnormal NM stress test  -anterior apical perfusion defect  -Post stress EF 40%    Coronary artery disease  -S/p CABG 2002 at [facility] in 2021 at [facility]     CKD, stage III  -creatinine 1 4 in April 2022  -Follows with Dr Wilson [facility]   -nephrology consult  -IV hydration pre and post to maximize kidney function  Abdominal aortic aneurysm   -s/p stent repair in 2012/2013    Hypertension, controlled    Hyperlipidemia      Spoke to patient and patient's wife  All risks benefit alternate the procedure discussed with them  Vitals are stable  He was evaluated by Nephrology  He understand that we may have to stage the procedure  Trying to find record for his previous bypass surgery  So far unsuccessful  Continue other Rx as before  All risks benefit complication of the procedure including risk of bleeding stroke heart attack even death but not limited to discussed with patient wishes to proceed he had procedures done 2 years ago at [facility]  Some stents were placed as per him  He does not know the details  Understand increase risk of worsening kidney function  Records from other facility reviewed  Patient has three-vessel bypass details of which not known about 3 years ago he had stent placed in his distal circumflex  His CABG was in 2002  History of Present Illness   HPI:  Marisela Zamarripa is a 76 y o  male who presents to [address] [place] [address] for outpatient elective left heart cardiac catheterization for evaluation of chest discomfort associated with moderate to severe dyspnea  The chest discomfort is not relieved immediately with rest        NM stress test, performed in February 2022, showed: Abnormal left ventricular perfusion    Mild reduction in uptake in the anterior wall during stress, consistent with ischemia  Also suspected area of infarct involving mid and apical portion of inferior wall  Post stress EF 40 %  Patient underwent a renal consultation in 2022, for IVY on CKD stage 3 and was recommended by Dr Fortino Zamora to avoid contrast exposure until renal function improved and stabilized  Baseline creatinine appears to be 1 42-1 7  He was cleared by Nephrology to proceed with cardiac catheterization  Prior to cardiac catheterization, nephrology is consulted and IV fluids will be given pre and postprocedure to reduce the incidence of contrast induced IVY  Historical Information   Past Medical History:   Diagnosis Date   • CAD (coronary artery disease)    • Cancer ([facility] )     prostate   • CHF (congestive heart failure) ([facility] )    • Diabetes mellitus ([facility] )    • Myocardial infarction [facility])      Past Surgical History:   Procedure Laterality Date   • ADENOIDECTOMY     • CARDIAC SURGERY  2002    3 cardiac bypass then angioplasty 2020   • CHOLECYSTECTOMY     • CORONARY ARTERY BYPASS GRAFT     • PROSTATE SURGERY     • TONSILLECTOMY       Social History   Social History     Substance and Sexual Activity   Alcohol Use Yes   • Alcohol/week: 14 0 standard drinks   • Types: 14 Cans of beer per week    Comment: 1 -2 daily     Social History     Substance and Sexual Activity   Drug Use Never     Social History     Tobacco Use   Smoking Status Former Smoker   • Types: Cigarettes   • Quit date:    • Years since quittin 8   Smokeless Tobacco Never Used     Family History:   Family History   Problem Relation Age of Onset   • Diabetes Mother    • Alcohol abuse Father    • Mental illness Neg Hx        Meds/Allergies   all medications and allergies reviewed  Allergies   Allergen Reactions   • Lisinopril Rash and Lip Swelling       Review of Systems   Constitutional: Negative for activity change, chills, diaphoresis, fever and unexpected weight change  HENT: Negative for congestion  Eyes: Negative for discharge and redness  Respiratory: Positive for shortness of breath  Negative for cough, chest tightness and wheezing  Exertional   Cardiovascular: Negative  Negative for chest pain, palpitations and leg swelling  Coronary artery disease with coronary artery bypass surgery 2002 and stenting later on done at [address] [place] [address] details not available   Gastrointestinal: Negative for abdominal pain, diarrhea and nausea  Endocrine: Negative  Genitourinary: Negative for decreased urine volume and urgency  History of CRI seen by Nephrology   Musculoskeletal: Positive for arthralgias  Negative for back pain and gait problem  Skin: Negative for rash and wound  Allergic/Immunologic: Negative  Neurological: Negative for dizziness, seizures, syncope, weakness, light-headedness and headaches  Hematological: Negative  Psychiatric/Behavioral: Negative for agitation and confusion  The patient is not nervous/anxious  Physical Exam  Constitutional:       General: He is not in acute distress  Appearance: He is well-developed  He is not diaphoretic  Neck:      Thyroid: No thyromegaly  Vascular: No JVD  Trachea: No tracheal deviation  Cardiovascular:      Rate and Rhythm: Normal rate and regular rhythm  Heart sounds: S1 normal and S2 normal  Heart sounds not distant  Murmur heard  Systolic (ejection) murmur is present with a grade of 2/6  No friction rub  No gallop  No S3 or S4 sounds  Pulmonary:      Effort: Pulmonary effort is normal  No respiratory distress  Breath sounds: Normal breath sounds  No wheezing or rales  Chest:      Chest wall: No tenderness  Abdominal:      General: Bowel sounds are normal  There is no distension  Palpations: Abdomen is soft  Tenderness: There is no abdominal tenderness  Musculoskeletal:         General: No deformity  Cervical back: Neck supple  Skin:     General: Skin is warm and dry  Coloration: Skin is not pale  Findings: No rash  Neurological:      Mental Status: He is alert and oriented to person, place, and time     Psychiatric:         Behavior: Behavior normal          Judgment: Judgment normal

## 2022-10-18 NOTE — TELEPHONE ENCOUNTER
SPOKE WITH PATIENT AND ADVISED HIM TO TAKE HIS MEDICATIONS WITH HIM TO [facility] TOMORROW SINCE HE WILL BE FASTING  THEY WILL LET YOU KNOW WHICH ONES TO TAKE  PATIENT UNDERSOOD AND WAS IN GOOD SPIRITS

## 2022-10-19 ENCOUNTER — HOSPITAL ENCOUNTER (OUTPATIENT)
Facility: HOSPITAL | Age: 77
Setting detail: OUTPATIENT SURGERY
Discharge: HOME | End: 2022-10-19
Attending: INTERNAL MEDICINE | Admitting: INTERNAL MEDICINE
Payer: COMMERCIAL

## 2022-10-19 VITALS
RESPIRATION RATE: 18 BRPM | TEMPERATURE: 98.7 F | HEART RATE: 54 BPM | DIASTOLIC BLOOD PRESSURE: 70 MMHG | SYSTOLIC BLOOD PRESSURE: 151 MMHG | OXYGEN SATURATION: 97 %

## 2022-10-19 DIAGNOSIS — N18.31 STAGE 3A CHRONIC KIDNEY DISEASE (HCC): ICD-10-CM

## 2022-10-19 DIAGNOSIS — I20.0 UNSTABLE ANGINA (HCC): ICD-10-CM

## 2022-10-19 DIAGNOSIS — Z95.1 S/P CABG X 3: ICD-10-CM

## 2022-10-19 DIAGNOSIS — N18.30 STAGE 3 CHRONIC KIDNEY DISEASE, UNSPECIFIED WHETHER STAGE 3A OR 3B CKD (HCC): Primary | ICD-10-CM

## 2022-10-19 DIAGNOSIS — I25.10 CORONARY ARTERY DISEASE INVOLVING NATIVE CORONARY ARTERY OF NATIVE HEART, UNSPECIFIED WHETHER ANGINA PRESENT: ICD-10-CM

## 2022-10-19 DIAGNOSIS — R94.31 ABNORMAL EKG: ICD-10-CM

## 2022-10-19 DIAGNOSIS — R94.39 ABNORMAL FINDING ON CARDIOVASCULAR STRESS TEST: ICD-10-CM

## 2022-10-19 LAB
ANION GAP SERPL CALCULATED.3IONS-SCNC: 6 MMOL/L (ref 4–13)
ATRIAL RATE: 50 BPM
BUN SERPL-MCNC: 36 MG/DL (ref 5–25)
CALCIUM SERPL-MCNC: 9.4 MG/DL (ref 8.4–10.2)
CHLORIDE SERPL-SCNC: 105 MMOL/L (ref 96–108)
CO2 SERPL-SCNC: 27 MMOL/L (ref 21–32)
CREAT SERPL-MCNC: 1.48 MG/DL (ref 0.6–1.3)
GFR SERPL CREATININE-BSD FRML MDRD: 44 ML/MIN/1.73SQ M
GLUCOSE P FAST SERPL-MCNC: 72 MG/DL (ref 65–99)
GLUCOSE SERPL-MCNC: 72 MG/DL (ref 65–140)
P AXIS: 73 DEGREES
POTASSIUM SERPL-SCNC: 4.3 MMOL/L (ref 3.5–5.3)
PR INTERVAL: 264 MS
QRS AXIS: -54 DEGREES
QRSD INTERVAL: 120 MS
QT INTERVAL: 474 MS
QTC INTERVAL: 424 MS
SODIUM SERPL-SCNC: 138 MMOL/L (ref 135–147)
T WAVE AXIS: -29 DEGREES
VENTRICULAR RATE: 48 BPM

## 2022-10-19 PROCEDURE — 93005 ELECTROCARDIOGRAM TRACING: CPT

## 2022-10-19 PROCEDURE — 93455 CORONARY ART/GRFT ANGIO S&I: CPT | Performed by: INTERNAL MEDICINE

## 2022-10-19 PROCEDURE — 80048 BASIC METABOLIC PNL TOTAL CA: CPT

## 2022-10-19 PROCEDURE — 99152 MOD SED SAME PHYS/QHP 5/>YRS: CPT | Performed by: INTERNAL MEDICINE

## 2022-10-19 PROCEDURE — C1894 INTRO/SHEATH, NON-LASER: HCPCS | Performed by: INTERNAL MEDICINE

## 2022-10-19 PROCEDURE — C1769 GUIDE WIRE: HCPCS | Performed by: INTERNAL MEDICINE

## 2022-10-19 PROCEDURE — 93010 ELECTROCARDIOGRAM REPORT: CPT | Performed by: INTERNAL MEDICINE

## 2022-10-19 PROCEDURE — 99214 OFFICE O/P EST MOD 30 MIN: CPT | Performed by: INTERNAL MEDICINE

## 2022-10-19 PROCEDURE — 99153 MOD SED SAME PHYS/QHP EA: CPT | Performed by: INTERNAL MEDICINE

## 2022-10-19 RX ORDER — LIDOCAINE HYDROCHLORIDE 10 MG/ML
INJECTION, SOLUTION EPIDURAL; INFILTRATION; INTRACAUDAL; PERINEURAL AS NEEDED
Status: DISCONTINUED | OUTPATIENT
Start: 2022-10-19 | End: 2022-10-19 | Stop reason: HOSPADM

## 2022-10-19 RX ORDER — MIDAZOLAM HYDROCHLORIDE 2 MG/2ML
INJECTION, SOLUTION INTRAMUSCULAR; INTRAVENOUS AS NEEDED
Status: DISCONTINUED | OUTPATIENT
Start: 2022-10-19 | End: 2022-10-19 | Stop reason: HOSPADM

## 2022-10-19 RX ORDER — ACETAMINOPHEN 325 MG/1
650 TABLET ORAL EVERY 6 HOURS PRN
Status: DISCONTINUED | OUTPATIENT
Start: 2022-10-19 | End: 2022-10-19 | Stop reason: HOSPADM

## 2022-10-19 RX ORDER — SODIUM CHLORIDE 9 MG/ML
50 INJECTION, SOLUTION INTRAVENOUS CONTINUOUS
Status: DISCONTINUED | OUTPATIENT
Start: 2022-10-19 | End: 2022-10-19 | Stop reason: HOSPADM

## 2022-10-19 RX ORDER — FENTANYL CITRATE 50 UG/ML
INJECTION, SOLUTION INTRAMUSCULAR; INTRAVENOUS AS NEEDED
Status: DISCONTINUED | OUTPATIENT
Start: 2022-10-19 | End: 2022-10-19 | Stop reason: HOSPADM

## 2022-10-19 RX ORDER — ASPIRIN 81 MG/1
324 TABLET, CHEWABLE ORAL ONCE
Status: COMPLETED | OUTPATIENT
Start: 2022-10-19 | End: 2022-10-19

## 2022-10-19 RX ORDER — ONDANSETRON 2 MG/ML
4 INJECTION INTRAMUSCULAR; INTRAVENOUS EVERY 6 HOURS PRN
Status: DISCONTINUED | OUTPATIENT
Start: 2022-10-19 | End: 2022-10-19 | Stop reason: HOSPADM

## 2022-10-19 RX ORDER — SODIUM CHLORIDE 9 MG/ML
50 INJECTION, SOLUTION INTRAVENOUS CONTINUOUS
Status: DISCONTINUED | OUTPATIENT
Start: 2022-10-19 | End: 2022-10-19

## 2022-10-19 RX ADMIN — ASPIRIN 324 MG: 81 TABLET, CHEWABLE ORAL at 09:00

## 2022-10-19 RX ADMIN — SODIUM CHLORIDE 339 ML: 0.9 INJECTION, SOLUTION INTRAVENOUS at 07:31

## 2022-10-19 NOTE — NURSING NOTE
painfree vss rhythm SB w/o ectopy  Rt groin w/o hematoma, gauze dsg dry intact  Pt oob to BR w/o diff  Been voiding w/o diff  Post cath instructiuons given w/ pt acknowledging bloodwork to be done by 10/21   Stable post cath, ready for d/c home

## 2022-10-19 NOTE — QUICK NOTE
Cardiology procedure note  Patient underwent cardiac catheterization by right common femoral artery  Access obtained without any complication  In view of patient's cri we try to minimize the dye use  Patient has history of coronary artery disease with CABG x3  Surgery was in 2002  Details of his grafts were not known but from Angiography it looks like patient has LIMA to LAD which is widely patent  Subclavian is tortuous  Vein graft to RCA is occluded  RCA is 100% occluded  It appears vein graft to circumflex is also occluded  No competitive flow is noted  Stent placed in distal circumflex is widely patent  Patient has diffuse disease including lad is proximally 100% occluded  Patient also had severe vascular disease  End of the procedure sheath was pulled manually  Pulses were Doppler  Patient be managed with medical therapy  Full report to follow  Report discussed with patient's family

## 2022-10-19 NOTE — DISCHARGE INSTRUCTIONS
1  Please see the post cardiac catheterization/ angioseal closure device instructions  No heavy lifting, greater than 10 lbs  or strenuous  activity for 48 hrs  See the post cardiac catheterization/ angioseal closure device instructions  2 Remove band aid tomorrow  Shower and wash area- groin gently with soap and water- beginning tomorrow  Rinse and pat dry  Apply new water seal band aid  Repeat this process for 5 days  No powders, creams lotions or antibiotic ointments  for 5 days  No tub baths, hot tubs or swimming for 5 days       3 No driving for 3 days

## 2022-10-19 NOTE — CONSULTS
Consultation - Nephrology   Speedy Abraham 76 y o  male MRN: [id number]  Unit/Bed#: AN CATH LAB ROOM Encounter: [id number]    ASSESSMENT/PLAN:   CKD IIIa/Perioperative renal optimization and risk reduction protocol:   -preoperative creatinine 1 4 on 10/10/22  -hx of IVY with creat 1 7 in July '22 suspected prerenal component with autoregulatory dysfunction from ARB +diuretics, possible progression of disease  -baseline creatinine recently appears 1 4  Previously 1 1 in January '22/'21  -Avoid NSAIDs, nephrotoxins and limit IV contrast with cardiac cath today, if possible  -Avoid hypotension  Maintain MAP >65  -Continue IVF per protocol pre and post procedure, continue 50 ml/hr x 6 hrs post procedure given EF 40%  -Hold home losartan and HCTZ today   -Daily weights  -Check BMP on 10/21/22   -f/u as outpatient as scheduled  -d/w Dr Kenny Pope   -will d/w Dr Elsa Mejia    Chronic kidney disease IIIA:    -Etiology:  Suspect hypertensive nephrosclerosis, diabetic nephropathy and age related nephron loss  -Baseline creatinine unclear but recently 1 4 since April 2022, previously 1 1 back to 2021  -Follows with Fadia Uribe  -f/u as scheduled with Dr Emily Bishop on 11/30/22    Microalbuminuria:  -suspect in the setting of long term diabetes and hypertension  - most recent microalbumin/creatinine ratio 56 mg on 10/10/22  -currently on losartan  -continue follow-up as outpatient    Hypertension/Volume status:  -BP acceptable  -Home medications:  Amlodipine 10 mg daily, HCTZ 12 5 mg daily, Imdur 30 mg daily, losartan 100 mg daily, Toprol XL 50 mg daily  -hold losartan and HCTZ today  -Optimize hemodynamic status to avoid delay in renal recovery  -recommend hold parameters on antihypertensive's for SBP <130 mmHg  -Avoid hypotension or fluctuations in blood pressure    Maintain MAP >65  -continue to trend    Bone Mineral Disease of CKD:  -continue to monitor and follow phosphorus, calcium, magnesium, PTH and vitamin-D 25 as outpatient CAD:  -+exertional angina and abnormal nuclear stress testing  -for elective cardiac catheterization this am  -s/p CABG '02  -s/p PCI/stent '21    Gross Hematuria:  -hx of episode of transient gross hematuria in July 2022  -no recurrence  -CT imaging unrevealing without evidence of calculi or bladder abnormality  -outpatient evaluation by urology scheduled  -management per Urology    AAA:  -s/p EVAR  -aneurysm sac 3 7 on recent noncontrast CT  -surveillance per with vascular surgery    DM II:  -stable  -HgbA1C 7 1  -continue to optimize glycemic control to slow progression of chronic kidney disease  -management per primary team      HISTORY OF PRESENT ILLNESS:  Requesting Physician: Timmy Espinosa MD  Reason for Consult: Perioperative IVY prevention protocol for cardiac catheterization    Nieves Lees is a 76y o  year old male  with CKD 3 with recent baseline creatinine and 1 1-1 4, CAD, s/p CABG '02 and PCI/stent '24, AAA, s/p EVAR '12, HTN, HLD, dm 2, prostate cancer, s/p prostatectomy, hx nephrolithiasis and recent hematuria who was [facility] for an elective cardiac catheterization  Patient followed by Dr Zoila Bernal and evaluated in July '22 due to abnormal labs with creatinine 1 7  No clear steady baseline but creatinine 1 1 in Jan '22 and '21  More recently baseline creatinine appears 1 4  Most recent creatinine 1 4 on 10/10/2022, improved from July  Patient currently on losartan and hydrochlorothiazide with last dose 10/18/2022  Patient receiving IVF per IVY prevention protocol with NSS 3 ml/kg over 2 hrs preprocedure and then 50 ml/hr x 6 hrs post procedure  Patient reports episode gross hematuria in July without recurrence  CT imaging in August with no evidence of hydronephrosis, bladder tumor or renal calculi  Evaluation with Urology scheduled  Patient denies nausea, vomiting, diarrhea, dyspnea, chest pain, orthopnea, recurrent hematuria, foamy urine    Patient reports last episode of renal calculi in   Nephrology was consulted as part of the perioperative IVY prevention protocol  Patient was seen and examined post operatively  PAST MEDICAL HISTORY:  Past Medical History:   Diagnosis Date   • CAD (coronary artery disease)    • Cancer ([facility] )     prostate   • CHF (congestive heart failure) ([facility])    • Diabetes mellitus ([facility] )    • Myocardial infarction ([place] [address] )        PAST SURGICAL HISTORY:  Past Surgical History:   Procedure Laterality Date   • ADENOIDECTOMY     • CARDIAC SURGERY  2002    3 cardiac bypass then angioplasty 2020   • CHOLECYSTECTOMY     • CORONARY ARTERY BYPASS GRAFT     • PROSTATE SURGERY     • TONSILLECTOMY         ALLERGIES:  Allergies   Allergen Reactions   • Lisinopril Rash and Lip Swelling       SOCIAL HISTORY:  Social History     Substance and Sexual Activity   Alcohol Use Yes   • Alcohol/week: 14 0 standard drinks   • Types: 14 Cans of beer per week    Comment: 1 -2 daily     Social History     Substance and Sexual Activity   Drug Use Never     Social History     Tobacco Use   Smoking Status Former Smoker   • Types: Cigarettes   • Quit date:    • Years since quittin 8   Smokeless Tobacco Never Used       FAMILY HISTORY:  Family History   Problem Relation Age of Onset   • Diabetes Mother    • Alcohol abuse Father    • Mental illness Neg Hx        MEDICATIONS:  Scheduled Meds:  Current Facility-Administered Medications   Medication Dose Route Frequency Provider Last Rate   • fentanyl citrate (PF)    PRN Idania Kaplan MD     • iohexol    PRN Idania Kaplan MD     • lidocaine (PF)    PRN Idania Kaplan MD     • midazolam    PRN Idania Kaplan MD     • sodium chloride  50 mL/hr Intravenous Continuous APOLLO Garcia         PRN Meds: •  fentanyl citrate (PF)  •  iohexol  •  lidocaine (PF)  •  midazolam    Continuous Infusions:sodium chloride, 50 mL/hr        REVIEW OF SYSTEMS:  A complete review of systems was done   Pertinent positives and negatives noted in the HPI but otherwise the review of systems is negative  PHYSICAL EXAM:  Current Weight:    First Weight:    Vitals:    10/19/22 0854   SpO2: 98%       Intake/Output Summary (Last 24 hours) at 10/19/2022 1003  Last data filed at 10/19/2022 1001  Gross per 24 hour   Intake --   Output 5 ml   Net -5 ml     General:  Awake, alert, appears comfortable and in no acute distress  Nontoxic  Skin:  No rash, warm, good skin turgor   Eyes:  PERRL, EOMI, sclerae nonicteric   no periorbital edema   ENT:  Moist mucous membranes  Neck:  Trachea midline, symmetric  No JVD  Carotid bruits  Chest:  Clear to auscultation bilaterally without wheezes, crackles or rhonchi  Midline sternotomy scar well healed  Sternum stable  CVS:  Regular rate and rhythm without murmur, gallop or rub  S1 and S2 identified and normal   No S3, S4    Abdomen:  Soft, nontender, nondistended without masses  Normal bowel sounds x 4 quadrants  No bruit  Extremities:  Warm, pink, motor and sensory intact and well perfused  No cyanosis, pallor  No BLE edema  Neuro:  Awake, alert, oriented x3  Grossly intact  Psych:  Appropriate affect  Mentating appropriately  Normal mental status exam    Lab Results:   Results from last 7 days   Lab Units 10/19/22  0730   SODIUM mmol/L 138   POTASSIUM mmol/L 4 3   CHLORIDE mmol/L 105   CO2 mmol/L 27   BUN mg/dL 36*   CREATININE mg/dL 1 48*   CALCIUM mg/dL 9 4       Radiology Results:   No orders to display   Imaging study:  CT abdomen pelvis without contrast 8/2/2022:  Imaging study reviewed  Evidence of simple renal cysts  No hydronephrosis or calculi    EMR, including Epic and [facility] Everywhere, reviewed  I have personally reviewed the blood work as stated above and in my note  I have personally reviewed CT abdomen pelvis imaging studies  I have personally reviewed internal Medicine, co-consultants and prior nephrology notes

## 2022-10-21 ENCOUNTER — APPOINTMENT (OUTPATIENT)
Dept: LAB | Facility: CLINIC | Age: 77
End: 2022-10-21
Payer: COMMERCIAL

## 2022-10-21 DIAGNOSIS — N18.30 STAGE 3 CHRONIC KIDNEY DISEASE, UNSPECIFIED WHETHER STAGE 3A OR 3B CKD (HCC): ICD-10-CM

## 2022-10-21 LAB
ANION GAP SERPL CALCULATED.3IONS-SCNC: 5 MMOL/L (ref 4–13)
BUN SERPL-MCNC: 25 MG/DL (ref 5–25)
CALCIUM SERPL-MCNC: 9.6 MG/DL (ref 8.3–10.1)
CHLORIDE SERPL-SCNC: 108 MMOL/L (ref 96–108)
CO2 SERPL-SCNC: 26 MMOL/L (ref 21–32)
CREAT SERPL-MCNC: 1.36 MG/DL (ref 0.6–1.3)
GFR SERPL CREATININE-BSD FRML MDRD: 49 ML/MIN/1.73SQ M
GLUCOSE P FAST SERPL-MCNC: 109 MG/DL (ref 65–99)
POTASSIUM SERPL-SCNC: 4.3 MMOL/L (ref 3.5–5.3)
SODIUM SERPL-SCNC: 139 MMOL/L (ref 135–147)

## 2022-10-21 PROCEDURE — 80048 BASIC METABOLIC PNL TOTAL CA: CPT

## 2022-10-21 PROCEDURE — 36415 COLL VENOUS BLD VENIPUNCTURE: CPT

## 2022-11-10 ENCOUNTER — OFFICE VISIT (OUTPATIENT)
Dept: CARDIOLOGY CLINIC | Facility: CLINIC | Age: 77
End: 2022-11-10

## 2022-11-10 VITALS
HEART RATE: 58 BPM | TEMPERATURE: 97.8 F | HEIGHT: 74 IN | OXYGEN SATURATION: 95 % | DIASTOLIC BLOOD PRESSURE: 68 MMHG | BODY MASS INDEX: 32.08 KG/M2 | WEIGHT: 250 LBS | SYSTOLIC BLOOD PRESSURE: 162 MMHG

## 2022-11-10 DIAGNOSIS — I10 ESSENTIAL HYPERTENSION: Primary | ICD-10-CM

## 2022-11-10 DIAGNOSIS — I20.8 STABLE ANGINA PECTORIS (HCC): ICD-10-CM

## 2022-11-10 DIAGNOSIS — I25.10 CORONARY ARTERY DISEASE INVOLVING NATIVE CORONARY ARTERY OF NATIVE HEART WITHOUT ANGINA PECTORIS: ICD-10-CM

## 2022-11-10 DIAGNOSIS — I50.22 CHRONIC SYSTOLIC HEART FAILURE (HCC): ICD-10-CM

## 2022-11-10 RX ORDER — ISOSORBIDE MONONITRATE 60 MG/1
60 TABLET, EXTENDED RELEASE ORAL DAILY
Qty: 90 TABLET | Refills: 2 | Status: SHIPPED | OUTPATIENT
Start: 2022-11-10

## 2022-11-10 NOTE — PROGRESS NOTES
Progress Note - Cardiology Office  [facility]    Fide Sheridan 76 y o  male MRN: [id number]  : 1945  Encounter: [id number]      ASSESSMENT:  CAD,   s/p CABG X 1 XB 5914 at [facility],   s/p PCI in  at [facility]        Pharmacologic stress test 2022  •  Stress Combined Conclusion: Left ventricular perfusion is abnormal  There is mild photon reduction in the anterior wall at stress   Findings are consistent with ischemia  Additional area of infarct involving the mid and apical portion of the inferior wall  •  Stress Function: Left ventricular function post-stress is abnormal  Global function is mildly reduced  There were no regional wall motion abnormalities during stress  Post-stress ejection fraction is 40 %  •  Stress ECG: No ST deviation is noted  There were no arrhythmias during recovery    The ECG was negative for ischemia  •  Perfusion: There is a left ventricular perfusion defect that is small in size with severe reduction in uptake present in the mid to apical inferior location(s) that is fixed  There is a left ventricular perfusion defect that is small to medium in size with mild reduction in uptake present in the mid to apical anterior location(s) that is reversible       Cardiac catheterization, 10/19/2022:  Prox LAD to Mid LAD lesion is 100% stenosed  [address] to LAD is widely patent  Prox RCA lesion is 100% stenosed  RCA graft is 100% occluded  Mid Cx to Dist Cx lesion is 40% stenosed  Ost Cx to Prox Cx lesion is 40% stenosed  1st Mrg lesion is 99% stenosed  Which appeared to be a bypassed vessel and bypassed seems to be 100% occluded could not visualized  Origin to Prox Graft lesion is 100% stenosed   RCA graft is 100% occluded  Distal circ stent is widely patent  Patient has grade 1 collaterals from left circulation to distal right coronary artery  Patient has severe three-vessel coronary artery disease with totally occluded 100% RCA, mid LAD and a medium size obtuse marginal  Patent stent seen in distal circumflex  Nonobstructive disease noted of left main and circumflex coronary artery  Vein graft to RCA is 100% occluded  And LIMA to LAD is widely patent  Recommendation    Patient has three-vessel coronary artery disease with   100% occluded right coronary artery,   100% occluded mid LAD,   vein graft to RCA is 100% occluded with RCA has some collaterals from left circulation  [address] to LAD is widely patent  Most likely graft to circumflex is also 100% occluded as no competitive flow is noted  As patient has CRI and we did not do LV g  By echo his EF is around 40%  At this time he is recommended aggressive medical therapy  He will probably have chronic stable angina  Optimize medical therapy continue other Rx as before  He also seems to have diffuse severe peripheral vascular disease advised him to follow-up with vascular  Patient's condition reviewed with patient and patient's wife  Discussed with patient's cardiology team          S/P Abdominal aortic aneurysm repair w/ Stent 2012/13     Hypertension  BP today is  162/68 mmHg with heart rate of 58 per minute    Noncompliant with diet  Patient admits to frequently using the salt shaker     Mixed hyperlipidemia  On fish oil 2 g daily, atorvastatin 40 mg  06/02/2021:  LDL 61, HDL 50, triglycerides 167, normal liver enzymes  Fish oil increased to 4 g daily  01/11/2022:  LDL 49, HDL 44, triglycerides 189,  07/19/2022:  LDL 62, , HDL 46, normal AST/ALT        Diabetes mellitus type 2, A1c 8 3     Obesity:  BMI is 32 25     S/p prostatectomy for CA prostate     CKD        RECOMMENDATIONS:  Continue current cardiac medication  Increase Imdur to 60 mg daily  Again strongly advised on low salt diet  Low sugar diet since his A1c has gone  Low-salt and low-cholesterol diet and regular cardiovascular exercise as tolerated      Please call [phone] if any questions      HPI :     Milla Ramírez Slick Ragsdale is a 76y o  year old male who came for follow up  He recently had a cardiac catheterization   We discuss the findings of the catheterization and as previously advise he is only a candidate for medical treatment and no further vascularization  His blood pressure is elevated and he admits to frequently using the salt shaker  As strongly emphasized to him to be on a low-salt/sodium diet      REVIEW OF SYSTEMS:  Denies chest pain, unusual dyspnea, palpitations or syncope    Historical Information   Past Medical History:   Diagnosis Date   • CAD (coronary artery disease)    • Cancer ([facility] )     prostate   • CHF (congestive heart failure) ([facility])    • Diabetes mellitus ([facility] )    • Myocardial infarction [facility])      Past Surgical History:   Procedure Laterality Date   • ADENOIDECTOMY     • CARDIAC CATHETERIZATION Left 10/19/2022    Procedure: Cardiac Left Heart Cath;  Surgeon: Alecia Monroy MD;  Location: [facility];   Service: Cardiology   • CARDIAC SURGERY  2002    3 cardiac bypass then angioplasty 2020   • CHOLECYSTECTOMY     • CORONARY ARTERY BYPASS GRAFT     • PROSTATE SURGERY     • TONSILLECTOMY       Social History     Substance and Sexual Activity   Alcohol Use Yes   • Alcohol/week: 14 0 standard drinks   • Types: 14 Cans of beer per week    Comment: 1 -2 daily     Social History     Substance and Sexual Activity   Drug Use Never     Social History     Tobacco Use   Smoking Status Former Smoker   • Types: Cigarettes   • Quit date:    • Years since quittin 8   Smokeless Tobacco Never Used     Family History:   Family History   Problem Relation Age of Onset   • Diabetes Mother    • Alcohol abuse Father    • Mental illness Neg Hx        Meds/Allergies     Allergies   Allergen Reactions   • Lisinopril Rash and Lip Swelling       Current Outpatient Medications:   •  amLODIPine (NORVASC) 10 mg tablet, Take 1 tablet (10 mg total) by mouth daily, Disp: 90 tablet, Rfl: 1  •  aspirin 81 mg chewable tablet, Chew 81 mg daily, Disp: , Rfl:   •  atorvastatin (LIPITOR) 40 mg tablet, Take 1 tablet (40 mg total) by mouth daily, Disp: 90 tablet, Rfl: 3  •  Blood Glucose Monitoring Suppl (OneTouch Verio Reflect) w/Device KIT, Check blood sugars twice daily  Please substitute with appropriate alternative as covered by patient's insurance  Dx: E11 65, Disp: 1 kit, Rfl: 0  •  clopidogrel (PLAVIX) 75 mg tablet, Take 75 mg by mouth daily, Disp: , Rfl:   •  Empagliflozin (Jardiance) 25 MG TABS, Take 1 tablet (25 mg total) by mouth every morning, Disp: 90 tablet, Rfl: 1  •  fenofibrate 160 MG tablet, Take 1 tablet (160 mg total) by mouth daily, Disp: 90 tablet, Rfl: 3  •  gabapentin (NEURONTIN) 600 MG tablet, TAKE 1 TABLET BY MOUTH  TWICE DAILY, Disp: 180 tablet, Rfl: 3  •  glimepiride (AMARYL) 2 mg tablet, Take 1 tablet (2 mg total) by mouth 2 (two) times a day, Disp: 180 tablet, Rfl: 1  •  glucose blood (OneTouch Verio) test strip, Check blood sugars twice daily  Please substitute with appropriate alternative as covered by patient's insurance   Dx: E11 65, Disp: 200 each, Rfl: 3  •  hydrochlorothiazide (HYDRODIURIL) 12 5 mg tablet, Take 1 tablet (12 5 mg total) by mouth daily, Disp: 30 tablet, Rfl: 0  •  Insulin Glargine Solostar (Lantus SoloStar) 100 UNIT/ML SOPN, Inject 0 2 mL (20 Units total) under the skin every evening, Disp: 15 mL, Rfl: 3  •  Insulin Pen Needle 32G X 4 MM MISC, Use every evening, Disp: 100 each, Rfl: 5  •  isosorbide mononitrate (IMDUR) 60 mg 24 hr tablet, Take 1 tablet (60 mg total) by mouth daily, Disp: 90 tablet, Rfl: 2  •  losartan (COZAAR) 100 MG tablet, Take 1 tablet (100 mg total) by mouth daily, Disp: 90 tablet, Rfl: 0  •  metFORMIN (GLUCOPHAGE) 500 mg tablet, Take 1 tablet (500 mg total) by mouth 2 (two) times a day with meals, Disp: 180 tablet, Rfl: 1  •  metoprolol succinate (TOPROL-XL) 50 mg 24 hr tablet, Take 1 tablet (50 mg total) by mouth daily, Disp: 90 tablet, Rfl: 3  • Multiple Vitamins-Minerals (MULTIVITAMIN MEN 50+ PO), Take by mouth daily, Disp: , Rfl:   •  Omega-3 Fatty Acids (fish oil) 1,000 mg, Take 4,000 mg by mouth daily , Disp: , Rfl:   •  OneTouch Delica Lancets 29M MISC, Check blood sugars twice daily  Please substitute with appropriate alternative as covered by patient's insurance  Dx: E11 65, Disp: 200 each, Rfl: 3  •  sitaGLIPtin (JANUVIA) 100 mg tablet, Take 100 mg by mouth daily, Disp: , Rfl:     Vitals: Blood pressure 162/68, pulse 58, temperature 97 8 °F (36 6 °C), height 6' 2" (1 88 m), weight 113 kg (250 lb), SpO2 95 %  ?  Body mass index is 32 1 kg/m²  Vitals:    11/10/22 0818   Weight: 113 kg (250 lb)     BP Readings from Last 3 Encounters:   11/10/22 162/68   10/19/22 151/70   07/26/22 (!) 183/75       Physical Exam:    Neurologic:  Alert & oriented x 3, no new focal deficits, Not in any acute distress,  Constitutional:  Well developed, well nourished, non-toxic appearance   Eyes:  Pupil equal and reacting to light, conjunctiva normal,   HENT:  Atraumatic, oropharynx moist, Neck- normal range of motion, no tenderness,  Neck supple, No JVP, No LNP   Respiratory:  Bilateral air entry, mostly clear to auscultation  Cardiovascular: S1-S2 regular with a I/VI systolic murmur   GI:  Soft, nondistended, normal bowel sounds, nontender, no hepatosplenomegaly appreciated  Musculoskeletal:  No tenderness, no deformities  Skin:  Well hydrated, no rash   Lymphatic:  No lymphadenopathy noted   Extremities:  No edema        Diagnostic Studies Review Cardio:      EKG:  Sinus bradycardia with first-degree AV block, heart rate 58 per minute, left axis deviation, LVH with QRS widening,    Cardiac testing:       Results for orders placed during the hospital encounter of 07/26/22    Echo complete w/ contrast if indicated    Interpretation Summary  •  Left Ventricle: Left ventricular cavity size is normal  Wall thickness is mildly increased   Systolic function is normal  Estimated ejection fraction is 50-55 % Although no diagnostic regional wall motion abnormality was identified, this possibility cannot be completely excluded on the basis of this study  Diastolic function is mildly abnormal, consistent with grade I (abnormal) relaxation  •  IVS: There is sigmoid appearance of the septum  •  Right Ventricle: Systolic function appears normal visually  •  Left Atrium: The atrium is mildly dilated  •  Aortic Valve: The aortic valve is trileaflet  The leaflets are moderately thickened  The leaflets are moderately calcified  The leaflets exhibit normal mobility  There is mild stenosis  Mean/peak gradient is 14/27 mm Hg, JEFF is 1 8 cm2, DVI is 0 52  •  Mitral Valve: There is mild regurgitation  •  Tricuspid Valve: There is mild regurgitation  •  Aorta: The aortic root is mildly dilated  Consider other imaging modalities for more accurate sizing  Results for orders placed during the hospital encounter of 02/16/22    NM Myocardial Perfusion Spect (Pharmacological Induced Stress and/or Rest)    Interpretation Summary  •  Stress Combined Conclusion: Left ventricular perfusion is abnormal  There is mild photon reduction in the anterior wall at stress  Findings are consistent with ischemia  Additional area of infarct involving the mid and apical portion of the inferior wall  •  Stress Function: Left ventricular function post-stress is abnormal  Global function is mildly reduced  There were no regional wall motion abnormalities during stress  Post-stress ejection fraction is 40 %  •  Stress ECG: No ST deviation is noted  There were no arrhythmias during recovery    The ECG was negative for ischemia  •  Perfusion: There is a left ventricular perfusion defect that is small in size with severe reduction in uptake present in the mid to apical inferior location(s) that is fixed   There is a left ventricular perfusion defect that is small to medium in size with mild reduction in uptake present in the mid to apical anterior location(s) that is reversible  Imaging:  Chest X-Ray:   No Chest XR results available for this patient  CT-scan of the chest:     No CTA results available for this patient    Lab Review   Lab Results   Component Value Date    WBC 8 86 10/10/2022    HGB 14 5 10/10/2022    HCT 45 6 10/10/2022    MCV 88 10/10/2022    RDW 13 2 10/10/2022     10/10/2022     BMP:  Lab Results   Component Value Date    SODIUM 139 10/21/2022    K 4 3 10/21/2022     10/21/2022    CO2 26 10/21/2022    BUN 25 10/21/2022    CREATININE 1 36 (H) 10/21/2022    GLUC 72 10/19/2022    GLUF 109 (H) 10/21/2022    CALCIUM 9 6 10/21/2022    CORRECTEDCA 8 9 02/17/2022    EGFR 49 10/21/2022    MG 1 9 02/17/2022     LFT:  Lab Results   Component Value Date    AST 25 07/19/2022    ALT 37 07/19/2022    ALKPHOS 46 07/19/2022    TP 7 9 07/19/2022    ALB 3 6 07/19/2022      No components found for: TSH3  No results found for: [facility]  Lab Results   Component Value Date    HGBA1C 7 1 (H) 10/10/2022     Lipid Profile:   Lab Results   Component Value Date    CHOLESTEROL 131 07/19/2022    HDL 46 07/19/2022    LDLCALC 62 07/19/2022    TRIG 115 07/19/2022     Lab Results   Component Value Date    CHOLESTEROL 131 07/19/2022    CHOLESTEROL 126 04/11/2022     No results found for: CKTOTAL, CKMB, CKMBINDEX, TROPONINI  Lab Results   Component Value Date    NTBNP 420 02/16/2022      Recent Results (from the past 672 hour(s))   Basic metabolic panel    Collection Time: 10/19/22  7:30 AM   Result Value Ref Range    Sodium 138 135 - 147 mmol/L    Potassium 4 3 3 5 - 5 3 mmol/L    Chloride 105 96 - 108 mmol/L    CO2 27 21 - 32 mmol/L    ANION GAP 6 4 - 13 mmol/L    BUN 36 (H) 5 - 25 mg/dL    Creatinine 1 48 (H) 0 60 - 1 30 mg/dL    Glucose 72 65 - 140 mg/dL    Glucose, Fasting 72 65 - 99 mg/dL    Calcium 9 4 8 4 - 10 2 mg/dL    eGFR 44 ml/min/1 73sq m   ECG 12 lead    Collection Time: 10/19/22  7:40 AM   Result Value Ref Range    Ventricular Rate 48 BPM    Atrial Rate 50 BPM    NE Interval 264 ms    QRSD Interval 120 ms    QT Interval 474 ms    QTC Interval 424 ms    P Axis 73 degrees    QRS Axis -54 degrees    T Wave Axis -29 degrees   Basic metabolic panel    Collection Time: 10/21/22  8:00 AM   Result Value Ref Range    Sodium 139 135 - 147 mmol/L    Potassium 4 3 3 5 - 5 3 mmol/L    Chloride 108 96 - 108 mmol/L    CO2 26 21 - 32 mmol/L    ANION GAP 5 4 - 13 mmol/L    BUN 25 5 - 25 mg/dL    Creatinine 1 36 (H) 0 60 - 1 30 mg/dL    Glucose, Fasting 109 (H) 65 - 99 mg/dL    Calcium 9 6 8 3 - 10 1 mg/dL    eGFR 49 ml/min/1 73sq m             Dr Milind Espinoza MD, [facility] [place]      "This note has been constructed using a voice recognition system  Therefore there may be syntax, spelling, and/or grammatical errors   Please call if you have any questions  "

## 2022-11-11 ENCOUNTER — OFFICE VISIT (OUTPATIENT)
Dept: FAMILY MEDICINE CLINIC | Facility: CLINIC | Age: 77
End: 2022-11-11

## 2022-11-11 ENCOUNTER — TELEPHONE (OUTPATIENT)
Dept: NEPHROLOGY | Facility: CLINIC | Age: 77
End: 2022-11-11

## 2022-11-11 VITALS
SYSTOLIC BLOOD PRESSURE: 138 MMHG | TEMPERATURE: 97.6 F | OXYGEN SATURATION: 93 % | BODY MASS INDEX: 32.47 KG/M2 | HEART RATE: 74 BPM | DIASTOLIC BLOOD PRESSURE: 80 MMHG | RESPIRATION RATE: 16 BRPM | WEIGHT: 253 LBS | HEIGHT: 74 IN

## 2022-11-11 DIAGNOSIS — Z85.46 H/O PROSTATE CANCER: ICD-10-CM

## 2022-11-11 DIAGNOSIS — E11.51 TYPE 2 DIABETES MELLITUS WITH DIABETIC PERIPHERAL ANGIOPATHY WITHOUT GANGRENE, WITHOUT LONG-TERM CURRENT USE OF INSULIN (HCC): Primary | ICD-10-CM

## 2022-11-11 DIAGNOSIS — N18.31 STAGE 3A CHRONIC KIDNEY DISEASE (HCC): ICD-10-CM

## 2022-11-11 DIAGNOSIS — I35.0 MILD AORTIC STENOSIS: ICD-10-CM

## 2022-11-11 DIAGNOSIS — E11.42 TYPE 2 DIABETES MELLITUS WITH DIABETIC POLYNEUROPATHY, WITHOUT LONG-TERM CURRENT USE OF INSULIN (HCC): ICD-10-CM

## 2022-11-11 DIAGNOSIS — I10 ESSENTIAL HYPERTENSION: ICD-10-CM

## 2022-11-11 DIAGNOSIS — I50.22 CHRONIC SYSTOLIC HEART FAILURE (HCC): ICD-10-CM

## 2022-11-11 DIAGNOSIS — I25.10 CORONARY ARTERY DISEASE INVOLVING NATIVE CORONARY ARTERY OF NATIVE HEART WITHOUT ANGINA PECTORIS: ICD-10-CM

## 2022-11-11 DIAGNOSIS — E78.2 MIXED HYPERLIPIDEMIA: ICD-10-CM

## 2022-11-11 PROBLEM — R07.9 CHEST PAIN: Status: RESOLVED | Noted: 2022-02-16 | Resolved: 2022-11-11

## 2022-11-11 RX ORDER — CLONIDINE HYDROCHLORIDE 0.1 MG/1
0.1 TABLET ORAL EVERY 12 HOURS SCHEDULED
Qty: 180 TABLET | Refills: 1 | Status: SHIPPED | OUTPATIENT
Start: 2022-11-11 | End: 2023-05-10

## 2022-11-11 NOTE — PROGRESS NOTES
Assessment/Plan:    1  Type 2 diabetes mellitus with diabetic peripheral angiopathy without gangrene, without long-term current use of insulin ([facility])  -     Hemoglobin A1C; Future; Expected date: 03/11/2023  -     Microalbumin / creatinine urine ratio; Future; Expected date: 03/11/2023    2  Type 2 diabetes mellitus with diabetic polyneuropathy, without long-term current use of insulin ([facility] )    3  Coronary artery disease involving native coronary artery of native heart without angina pectoris  -     Lipid Panel with Direct LDL reflex; Future; Expected date: 03/11/2023    4  Essential hypertension  -     cloNIDine (Catapres) 0 1 mg tablet; Take 1 tablet (0 1 mg total) by mouth every 12 (twelve) hours  -     Comprehensive metabolic panel; Future; Expected date: 03/11/2023    5  Stage 3a chronic kidney disease (HCC)  -     CBC and Platelet; Future; Expected date: 03/11/2023    6  H/O prostate cancer    7  Mixed hyperlipidemia  -     Lipid Panel with Direct LDL reflex; Future; Expected date: 03/11/2023    8  Chronic systolic heart failure (HCC)    9  Mild aortic stenosis  Will add catapress to BP - given hiostory his BP should be better  BP at whatever office he was at yesterday was quite high as well - pt states he was rushing around then though  DM is well controlled  Labs ordered          There are no Patient Instructions on file for this visit  Return in 4 months (on 3/11/2023) for Diabetes follow-up  Subjective:      Patient ID: Milderd Client is a 76 y o  male      Chief Complaint   Patient presents with   • Follow-up     3 month f/u-wma       Pt is here for a [facility] wu   Had labs  Was seen by cardio yesterday      The following portions of the patient's history were reviewed and updated as appropriate: allergies, current medications, past family history, past medical history, past social history, past surgical history and problem list     Review of Systems   Constitutional: Negative for activity change, appetite change, chills, diaphoresis, fatigue, fever and unexpected weight change  HENT: Negative for congestion, dental problem, ear pain, mouth sores, sinus pressure, sinus pain, sore throat and trouble swallowing  Eyes: Negative for photophobia, discharge and itching  Respiratory: Negative for apnea, chest tightness and shortness of breath  Cardiovascular: Negative for chest pain, palpitations and leg swelling  Gastrointestinal: Negative for abdominal distention, abdominal pain, blood in stool, nausea and vomiting  Endocrine: Negative for cold intolerance, heat intolerance, polydipsia, polyphagia and polyuria  Genitourinary: Negative for difficulty urinating  Musculoskeletal: Negative for arthralgias  Skin: Negative for color change and wound  Neurological: Negative for dizziness, syncope, speech difficulty and headaches  Hematological: Negative for adenopathy  Psychiatric/Behavioral: Negative for agitation and behavioral problems  Current Outpatient Medications   Medication Sig Dispense Refill   • amLODIPine (NORVASC) 10 mg tablet Take 1 tablet (10 mg total) by mouth daily 90 tablet 1   • aspirin 81 mg chewable tablet Chew 81 mg daily     • atorvastatin (LIPITOR) 40 mg tablet Take 1 tablet (40 mg total) by mouth daily 90 tablet 3   • Blood Glucose Monitoring Suppl (OneTouch Verio Reflect) w/Device KIT Check blood sugars twice daily  Please substitute with appropriate alternative as covered by patient's insurance   Dx: E11 65 1 kit 0   • cloNIDine (Catapres) 0 1 mg tablet Take 1 tablet (0 1 mg total) by mouth every 12 (twelve) hours 180 tablet 1   • clopidogrel (PLAVIX) 75 mg tablet Take 75 mg by mouth daily     • Empagliflozin (Jardiance) 25 MG TABS Take 1 tablet (25 mg total) by mouth every morning 90 tablet 1   • fenofibrate 160 MG tablet Take 1 tablet (160 mg total) by mouth daily 90 tablet 3   • gabapentin (NEURONTIN) 600 MG tablet TAKE 1 TABLET BY MOUTH  TWICE DAILY 180 tablet 3   • glimepiride (AMARYL) 2 mg tablet Take 1 tablet (2 mg total) by mouth 2 (two) times a day 180 tablet 1   • glucose blood (OneTouch Verio) test strip Check blood sugars twice daily  Please substitute with appropriate alternative as covered by patient's insurance  Dx: E11 65 200 each 3   • hydrochlorothiazide (HYDRODIURIL) 12 5 mg tablet Take 1 tablet (12 5 mg total) by mouth daily 30 tablet 0   • Insulin Glargine Solostar (Lantus SoloStar) 100 UNIT/ML SOPN Inject 0 2 mL (20 Units total) under the skin every evening 15 mL 3   • Insulin Pen Needle 32G X 4 MM MISC Use every evening 100 each 5   • isosorbide mononitrate (IMDUR) 60 mg 24 hr tablet Take 1 tablet (60 mg total) by mouth daily 90 tablet 2   • losartan (COZAAR) 100 MG tablet Take 1 tablet (100 mg total) by mouth daily 90 tablet 0   • metFORMIN (GLUCOPHAGE) 500 mg tablet Take 1 tablet (500 mg total) by mouth 2 (two) times a day with meals 180 tablet 1   • metoprolol succinate (TOPROL-XL) 50 mg 24 hr tablet Take 1 tablet (50 mg total) by mouth daily 90 tablet 3   • Multiple Vitamins-Minerals (MULTIVITAMIN MEN 50+ PO) Take by mouth daily     • Omega-3 Fatty Acids (fish oil) 1,000 mg Take 4,000 mg by mouth daily      • OneTouch Delica Lancets 54F MISC Check blood sugars twice daily  Please substitute with appropriate alternative as covered by patient's insurance  Dx: E11 65 200 each 3   • sitaGLIPtin (JANUVIA) 100 mg tablet Take 100 mg by mouth daily       No current facility-administered medications for this visit  Objective:    /80   Pulse 74   Temp 97 6 °F (36 4 °C)   Resp 16   Ht 6' 2" (1 88 m)   Wt 115 kg (253 lb)   SpO2 93%   BMI 32 48 kg/m²        Physical Exam  Vitals and nursing note reviewed  Constitutional:       General: He is not in acute distress  Appearance: He is well-developed  He is not diaphoretic  HENT:      Head: Normocephalic and atraumatic        Right Ear: External ear normal       Left Ear: External ear normal       Nose: Nose normal       Mouth/Throat:      Pharynx: No oropharyngeal exudate  Eyes:      General: No scleral icterus  Right eye: No discharge  Left eye: No discharge  Pupils: Pupils are equal, round, and reactive to light  Neck:      Thyroid: No thyromegaly  Cardiovascular:      Rate and Rhythm: Normal rate  Heart sounds: Murmur heard  Pulmonary:      Effort: Pulmonary effort is normal  No respiratory distress  Breath sounds: Normal breath sounds  No wheezing  Abdominal:      General: Bowel sounds are normal  There is no distension  Palpations: Abdomen is soft  There is no mass  Tenderness: There is no abdominal tenderness  There is no guarding or rebound  Musculoskeletal:         General: Normal range of motion  Skin:     General: Skin is warm and dry  Findings: No erythema or rash  Neurological:      Mental Status: He is alert        Coordination: Coordination normal       Deep Tendon Reflexes: Reflexes normal    Psychiatric:         Behavior: Behavior normal               Recent Results (from the past 672 hour(s))   Basic metabolic panel    Collection Time: 10/19/22  7:30 AM   Result Value Ref Range    Sodium 138 135 - 147 mmol/L    Potassium 4 3 3 5 - 5 3 mmol/L    Chloride 105 96 - 108 mmol/L    CO2 27 21 - 32 mmol/L    ANION GAP 6 4 - 13 mmol/L    BUN 36 (H) 5 - 25 mg/dL    Creatinine 1 48 (H) 0 60 - 1 30 mg/dL    Glucose 72 65 - 140 mg/dL    Glucose, Fasting 72 65 - 99 mg/dL    Calcium 9 4 8 4 - 10 2 mg/dL    eGFR 44 ml/min/1 73sq m   ECG 12 lead    Collection Time: 10/19/22  7:40 AM   Result Value Ref Range    Ventricular Rate 48 BPM    Atrial Rate 50 BPM    MI Interval 264 ms    QRSD Interval 120 ms    QT Interval 474 ms    QTC Interval 424 ms    P Axis 73 degrees    QRS Axis -54 degrees    T Wave Axis -29 degrees   Basic metabolic panel    Collection Time: 10/21/22  8:00 AM   Result Value Ref Range    Sodium 139 135 - 147 mmol/L Potassium 4 3 3 5 - 5 3 mmol/L    Chloride 108 96 - 108 mmol/L    CO2 26 21 - 32 mmol/L    ANION GAP 5 4 - 13 mmol/L    BUN 25 5 - 25 mg/dL    Creatinine 1 36 (H) 0 60 - 1 30 mg/dL    Glucose, Fasting 109 (H) 65 - 99 mg/dL    Calcium 9 6 8 3 - 10 1 mg/dL    eGFR 49 ml/min/1 73sq m         Lola Morillo, DO

## 2022-11-16 DIAGNOSIS — E11.42 TYPE 2 DIABETES MELLITUS WITH DIABETIC POLYNEUROPATHY, WITHOUT LONG-TERM CURRENT USE OF INSULIN (HCC): ICD-10-CM

## 2022-11-18 RX ORDER — EMPAGLIFLOZIN 25 MG/1
TABLET, FILM COATED ORAL
Qty: 90 TABLET | Refills: 3 | Status: SHIPPED | OUTPATIENT
Start: 2022-11-18

## 2022-11-21 ENCOUNTER — OFFICE VISIT (OUTPATIENT)
Dept: PODIATRY | Facility: CLINIC | Age: 77
End: 2022-11-21

## 2022-11-21 VITALS
HEIGHT: 74 IN | WEIGHT: 253 LBS | DIASTOLIC BLOOD PRESSURE: 80 MMHG | BODY MASS INDEX: 32.47 KG/M2 | RESPIRATION RATE: 17 BRPM | SYSTOLIC BLOOD PRESSURE: 138 MMHG

## 2022-11-21 DIAGNOSIS — M79.672 PAIN IN BOTH FEET: ICD-10-CM

## 2022-11-21 DIAGNOSIS — I70.209 PERIPHERAL ARTERIOSCLEROSIS (HCC): ICD-10-CM

## 2022-11-21 DIAGNOSIS — M77.41 METATARSALGIA OF BOTH FEET: ICD-10-CM

## 2022-11-21 DIAGNOSIS — M54.16 RADICULOPATHY OF LUMBAR REGION: ICD-10-CM

## 2022-11-21 DIAGNOSIS — B35.1 ONYCHOMYCOSIS: ICD-10-CM

## 2022-11-21 DIAGNOSIS — M77.42 METATARSALGIA OF BOTH FEET: ICD-10-CM

## 2022-11-21 DIAGNOSIS — E11.42 DIABETIC POLYNEUROPATHY ASSOCIATED WITH TYPE 2 DIABETES MELLITUS (HCC): Primary | ICD-10-CM

## 2022-11-21 DIAGNOSIS — M79.671 PAIN IN BOTH FEET: ICD-10-CM

## 2022-11-21 NOTE — PROGRESS NOTES
Assessment/Plan:  Metatarsalgia secondary to diabetic neuropathy   Mycosis of nail and skin   Radiculopathy   Pain upon ambulation   Mild peripheral artery disease      Plan   Patient educated on condition   All nails debrided without pain or complication   Patient remain on gabapentin as directed          Diagnoses and all orders for this visit:     Radiculopathy of lumbar region  -     gabapentin (NEURONTIN) 600 MG tablet; Take 1 tablet (600 mg total) by mouth 2 (two) times a day     Dermatophytosis     Tinea pedis of both feet     Diabetic polyneuropathy associated with type 2 diabetes mellitus ([facility] [address] )  -     gabapentin (NEURONTIN) 600 MG tablet; Take 1 tablet (600 mg total) by mouth 2 (two) times a day     Onychomycosis     Peripheral arteriosclerosis (HCC)     Pain in both feet     Metatarsalgia of both feet  -     gabapentin (NEURONTIN) 600 MG tablet;  Take 1 tablet (600 mg total) by mouth 2 (two) times a day            Subjective:  Patient is doing better on present gabapentin regimen   He complains of pain in his toes with ambulation and shoe wear   No history of trauma             Allergies   Allergen Reactions   • Lisinopril Rash and Lip Swelling            Current Outpatient Medications:   •  amLODIPine (NORVASC) 10 mg tablet, Take 1 tablet (10 mg total) by mouth daily, Disp: 90 tablet, Rfl: 1  •  aspirin 81 mg chewable tablet, Chew 81 mg daily, Disp: , Rfl:   •  atorvastatin (LIPITOR) 40 mg tablet, Take 40 mg by mouth daily, Disp: , Rfl:   •  canagliflozin (Invokana) 100 mg, Take 100 mg by mouth daily before breakfast , Disp: , Rfl:   •  clopidogrel (PLAVIX) 75 mg tablet, Take 75 mg by mouth daily, Disp: , Rfl:   •  gabapentin (NEURONTIN) 600 MG tablet, Take 1 tablet (600 mg total) by mouth 2 (two) times a day, Disp: 180 tablet, Rfl: 0  •  glimepiride (AMARYL) 2 mg tablet, Take 2 mg by mouth every morning before breakfast, Disp: , Rfl:   •  hydrochlorothiazide (HYDRODIURIL) 25 mg tablet, Take 25 mg by mouth daily, Disp: , Rfl:   •  losartan (COZAAR) 50 mg tablet, Take 2 tablets (100 mg total) by mouth daily, Disp: 90 tablet, Rfl: 3  •  metFORMIN (GLUCOPHAGE) 1000 MG tablet, Take 1 tablet (1,000 mg total) by mouth 2 (two) times a day with meals, Disp: 180 tablet, Rfl: 0  •  metoprolol succinate (TOPROL-XL) 50 mg 24 hr tablet, Take 1 tablet (50 mg total) by mouth daily, Disp: 90 tablet, Rfl: 3  •  Multiple Vitamins-Minerals (MULTIVITAMIN MEN 50+ PO), Take by mouth daily, Disp: , Rfl:   •  Omega-3 Fatty Acids (fish oil) 1,000 mg, Take 4,000 mg by mouth daily , Disp: , Rfl:   •  sitaGLIPtin (JANUVIA) 100 mg tablet, Take 100 mg by mouth daily, Disp: , Rfl:            Patient Active Problem List   Diagnosis   • Mixed hyperlipidemia   • Essential hypertension   • Coronary artery disease involving native coronary artery of native heart without angina pectoris   • S/P angioplasty with stent   • S/P CABG x 3   • S/P AAA repair   • S/P prostatectomy   • H/O prostate cancer   • Type 2 diabetes mellitus with diabetic polyneuropathy, without long-term current use of insulin (HCC)   • Varicose veins of left lower extremity   • History of endovascular stent graft for abdominal aortic aneurysm (AAA)   • Bruit (arterial)   • Peripheral artery disease ([facility] [address] )             Patient ID: Thomas Horne is a 77 y  o  male      HPI     The following portions of the patient's history were reviewed and updated as appropriate:      family history includes Alcohol abuse in his father; Diabetes in his mother        reports that he quit smoking about 34 years ago  His smoking use included cigarettes  He has never used smokeless tobacco  He reports current alcohol use of about 14 0 standard drinks of alcohol per week   He reports that he does not use drugs         Objective:  Patient's shoes and socks removed    Foot ExamPhysical Exam          General  General Appearance: appears stated age and healthy   Orientation: alert and oriented to person, place, and time   Affect: appropriate   Gait: antalgic         Right Foot/Ankle      Inspection and Palpation  Tenderness: metatarsals   Swelling: none   Arch: pes planus  Claw Toes: fifth toe  Hallux limitus: yes  Skin Exam: callus, dry skin and tinea;      Neurovascular  Dorsalis pedis: 1+  Posterior tibial: 1+  Saphenous nerve sensation: absent  Tibial nerve sensation: absent  Superficial peroneal nerve sensation: absent  Deep peroneal nerve sensation: absent  Sural nerve sensation: absent        Left Foot/Ankle       Inspection and Palpation  Tenderness: metatarsals   Swelling: none   Arch: pes planus  Claw toes: second toe and fifth toe  Hallux limitus: yes  Skin Exam: callus, dry skin and tinea;      Neurovascular  Dorsalis pedis: 1+  Posterior tibial: 1+  Saphenous nerve sensation: absent  Tibial nerve sensation: absent  Superficial peroneal nerve sensation: absent  Deep peroneal nerve sensation: absent  Sural nerve sensation: absent           Physical Exam  Vitals signs and nursing note reviewed  Cardiovascular:      Rate and Rhythm: Normal rate and regular rhythm       Pulses:           Dorsalis pedis pulses are 1+ on the right side and 1+ on the left side         Posterior tibial pulses are 1+ on the right side and 1+ on the left side  Feet:      Right foot:      Skin integrity: Callus and dry skin present       Left foot:      Skin integrity: Callus and dry skin present  Skin:     Capillary Refill: Capillary refill takes 2 to 3 seconds       Comments:   All nails are thick and mycotic   Patient demonstrates bilateral dermatophytosis  Sidra Enriquez is a pre ulcerative/ corn on the plantar aspect 2nd left toe     Right Foot/Ankle   Right Foot Inspection  Skin Exam: dry skin, callus and callus                                 Vascular     The right DP pulse is 1+  The right PT pulse is 1+     Right Toe  - Comprehensive Exam  Arch: pes planus  Claw Toes: fifth toe  Hallux limitus: yes  Swelling: none   Tenderness: metatarsals            Left Foot/Ankle  Left Foot Inspection  Skin Exam: dry skin and callus                                             Vascular     The left DP pulse is 1+  The left PT pulse is 1+  Left Toe  - Comprehensive Exam  Arch: pes planus  Claw toes: second toe and fifth toe  Hallux limitus: yes  Swelling: none   Tenderness: metatarsals      Patient's shoes and socks removed  Assign Risk Category:  Deformity present; Loss of protective sensation; Weak pulses       Risk: 2

## 2022-11-29 DIAGNOSIS — I25.10 CORONARY ARTERY DISEASE INVOLVING NATIVE CORONARY ARTERY OF NATIVE HEART WITHOUT ANGINA PECTORIS: ICD-10-CM

## 2022-11-29 DIAGNOSIS — Z95.828 HISTORY OF ENDOVASCULAR STENT GRAFT FOR ABDOMINAL AORTIC ANEURYSM (AAA): ICD-10-CM

## 2022-11-29 DIAGNOSIS — I10 ESSENTIAL HYPERTENSION: ICD-10-CM

## 2022-11-30 ENCOUNTER — OFFICE VISIT (OUTPATIENT)
Dept: NEPHROLOGY | Facility: CLINIC | Age: 77
End: 2022-11-30

## 2022-11-30 VITALS
BODY MASS INDEX: 32.34 KG/M2 | SYSTOLIC BLOOD PRESSURE: 132 MMHG | WEIGHT: 252 LBS | HEIGHT: 74 IN | DIASTOLIC BLOOD PRESSURE: 64 MMHG | HEART RATE: 52 BPM

## 2022-11-30 DIAGNOSIS — N18.30 BENIGN HYPERTENSION WITH CKD (CHRONIC KIDNEY DISEASE) STAGE III (HCC): ICD-10-CM

## 2022-11-30 DIAGNOSIS — I12.9 BENIGN HYPERTENSION WITH CKD (CHRONIC KIDNEY DISEASE) STAGE III (HCC): ICD-10-CM

## 2022-11-30 DIAGNOSIS — M89.9 CHRONIC KIDNEY DISEASE-MINERAL AND BONE DISORDER: ICD-10-CM

## 2022-11-30 DIAGNOSIS — N18.31 STAGE 3A CHRONIC KIDNEY DISEASE (HCC): Primary | ICD-10-CM

## 2022-11-30 DIAGNOSIS — E83.9 CHRONIC KIDNEY DISEASE-MINERAL AND BONE DISORDER: ICD-10-CM

## 2022-11-30 DIAGNOSIS — R31.0 GROSS HEMATURIA: ICD-10-CM

## 2022-11-30 DIAGNOSIS — N18.9 CHRONIC KIDNEY DISEASE-MINERAL AND BONE DISORDER: ICD-10-CM

## 2022-11-30 RX ORDER — METOPROLOL SUCCINATE 50 MG/1
TABLET, EXTENDED RELEASE ORAL
Qty: 90 TABLET | Refills: 3 | Status: SHIPPED | OUTPATIENT
Start: 2022-11-30

## 2022-11-30 RX ORDER — LOSARTAN POTASSIUM 100 MG/1
TABLET ORAL
Qty: 90 TABLET | Refills: 3 | Status: SHIPPED | OUTPATIENT
Start: 2022-11-30

## 2022-11-30 RX ORDER — AMLODIPINE BESYLATE 10 MG/1
TABLET ORAL
Qty: 90 TABLET | Refills: 3 | Status: SHIPPED | OUTPATIENT
Start: 2022-11-30

## 2022-11-30 NOTE — PROGRESS NOTES
NEPHROLOGY OFFICE PROGRESS NOTE   Key Gan 76 y o  male MRN: [id number]  DATE: 11/30/22  Reason for visit: Continued evaluation and management of CKD    ASSESSMENT & PLAN:  1  Chronic kidney disease, stage III:  · [facility] baseline serum creatinine appears to be around 1 3-1 5  · The etiology of his renal disease is suspected to be hypertensive nephrosclerosis and diabetic kidney disease  · Renal function is stable  SCr 1 36    · Treatment/Management consists of controlling modifiable risk factors (good blood pressure, glycemic and lipid control) and avoidance of nephrotoxic medications in order to slow down progression of renal disease  2  Hypertension:  · BP is close to goal (<130/80)  · Current medications: HCTZ 12 5 mg daily, metoprolol succinate 50 mg daily, losartan 100 mg daily, amlodipine 10 mg daily, clonidine 0 1 mg twice a day, ISMN 60 mg daily  · No changes today since Clonidine was recently added  · Check home BP for 1 week prior to next visit  3  Coronary artery disease:  · Medical management recommended  · Follow up with Dr Yolette Baptiste      4  Mineral bone disease:  · Check PTH, phosphorus, and vitamin-D     5  Gross hematuria:  · Has a urology appointment with Emma Espana in December 2022  · Check UA  6  Microalbuminuria  · This is at goal   · Most recent mACR 56 in Oct 2022  · Continue losartan  7  Diabetes mellitus:  · Management is deferred to PCP  · Currently on insulin and Jardiance  8  Hyperlipidemia:  · On atorvastatin and fenofibrate    Patient Instructions   You have chronic kidney disease (CKD) and your kidney function is stable  I recommend no changes to your medications today  Follow up in 6 months - please obtain blood work 1-2 weeks prior to the appointment  Please check your BP twice daily for 1 week and bring a log with your next visit     Please avoid taking NSAIDs (Ibuprofen, Motrin, Aleve, Advil, Naproxen, Celebrex, etc )  Make sure you are eating healthy and have adequate exercise  SUBJECTIVE / INTERVAL HISTORY:  Maxime Gómez is a 76year old gentleman who I am seeing for the 1st time in the office for continued evaluation of chronic kidney disease  He was seen by Dr Christin Rockwell on initial consultation back in July 2022  He had a cardiac catheterization done in October 2022 and medical management was recommended for multivessel coronary artery disease  He has not had any hospitalizations or ER visits over the past 4 months  He denies any acute complaints  He does have exertional shortness of breath  He checks BP once a week - BP ranges in the 130s to 140s over 70s  He is not using NSAIDs  He recalls having an episode of gross hematuria months ago and is scheduled to see Urology at [facility] next month  PMH/PSH:   1  HTN: Diagnosed at least 20 years ago  No admissions for HTN urgency  Highest recalled SBP is > 180 mm Hg  2  DM: Diagnosed about 25 years ago  No DM retinopathy  He started insulin about 3-4 months ago  3  HLP: on atorvastatin  4  CAD s/p CABG 2002, s/p PCI  5  Prostate cancer s/p prostatectomy  6  AAA s/p EVAR  7  Nephrolithiasis  8  DDD s/p laminectomy   9  Cholecystectomy    No GERD, CVA, COPD, SUSAN, asthma, liver disease  Previous work up:   8/2/22 CT abdomen: 1 or more simple renal cysts  No hydronephrosis  Parenchymal thinning/scar at the R inferior pole  ALLERGIES:   Allergies   Allergen Reactions   • Lisinopril Rash and Lip Swelling     REVIEW OF SYSTEMS:  Review of Systems   Constitutional: Negative for appetite change, chills, fatigue and fever  Respiratory: Positive for shortness of breath (on exertion)  Negative for cough  Cardiovascular: Negative for chest pain and leg swelling  Gastrointestinal: Negative for abdominal pain, diarrhea, nausea and vomiting  Genitourinary: Positive for hematuria (gross hematuria)  Negative for dysuria     Musculoskeletal: Positive for back pain  Negative for arthralgias  Allergic/Immunologic: Negative for immunocompromised state  Neurological: Negative for dizziness and light-headedness  OBJECTIVE:  /64 (BP Location: Left arm, Patient Position: Sitting, Cuff Size: Large)   Pulse (!) 52   Ht 6' 2" (1 88 m)   Wt 114 kg (252 lb)   BMI 32 35 kg/m²   Current Weight:   Body mass index is 32 48 kg/m²  Physical Exam  Constitutional:       General: He is not in acute distress  Appearance: Normal appearance  He is well-developed and well-nourished  He is obese  He is not ill-appearing or diaphoretic  HENT:      Head: Normocephalic and atraumatic  Mouth/Throat:      Mouth: Mucous membranes are normal    Eyes:      General: No scleral icterus  Conjunctiva/sclera: Conjunctivae normal    Neck:      Vascular: No JVD  Cardiovascular:      Rate and Rhythm: Normal rate and regular rhythm  Heart sounds: Normal heart sounds  Pulmonary:      Effort: Pulmonary effort is normal  No respiratory distress  Breath sounds: Normal breath sounds  Abdominal:      General: Bowel sounds are normal       Palpations: Abdomen is soft  Musculoskeletal:         General: No edema  Cervical back: Neck supple  Right lower leg: No edema  Left lower leg: No edema  Skin:     General: Skin is warm and dry  Neurological:      Mental Status: He is alert and oriented to person, place, and time     Psychiatric:         Mood and Affect: Mood and affect normal          Behavior: Behavior normal        Medications:  Current Outpatient Medications:   •  amLODIPine (NORVASC) 10 mg tablet, Take 1 tablet (10 mg total) by mouth daily, Disp: 90 tablet, Rfl: 1  •  aspirin 81 mg chewable tablet, Chew 81 mg daily, Disp: , Rfl:   •  atorvastatin (LIPITOR) 40 mg tablet, Take 1 tablet (40 mg total) by mouth daily, Disp: 90 tablet, Rfl: 3  •  Blood Glucose Monitoring Suppl (OneTouch Verio Reflect) w/Device KIT, Check blood sugars twice daily  Please substitute with appropriate alternative as covered by patient's insurance  Dx: E11 65, Disp: 1 kit, Rfl: 0  •  cloNIDine (Catapres) 0 1 mg tablet, Take 1 tablet (0 1 mg total) by mouth every 12 (twelve) hours, Disp: 180 tablet, Rfl: 1  •  clopidogrel (PLAVIX) 75 mg tablet, Take 75 mg by mouth daily, Disp: , Rfl:   •  fenofibrate 160 MG tablet, Take 1 tablet (160 mg total) by mouth daily, Disp: 90 tablet, Rfl: 3  •  gabapentin (NEURONTIN) 600 MG tablet, TAKE 1 TABLET BY MOUTH  TWICE DAILY, Disp: 180 tablet, Rfl: 3  •  glimepiride (AMARYL) 2 mg tablet, Take 1 tablet (2 mg total) by mouth 2 (two) times a day, Disp: 180 tablet, Rfl: 1  •  glucose blood (OneTouch Verio) test strip, Check blood sugars twice daily  Please substitute with appropriate alternative as covered by patient's insurance   Dx: E11 65, Disp: 200 each, Rfl: 3  •  hydrochlorothiazide (HYDRODIURIL) 12 5 mg tablet, Take 1 tablet (12 5 mg total) by mouth daily, Disp: 30 tablet, Rfl: 0  •  Insulin Glargine Solostar (Lantus SoloStar) 100 UNIT/ML SOPN, Inject 0 2 mL (20 Units total) under the skin every evening, Disp: 15 mL, Rfl: 3  •  Insulin Pen Needle 32G X 4 MM MISC, Use every evening, Disp: 100 each, Rfl: 5  •  isosorbide mononitrate (IMDUR) 60 mg 24 hr tablet, Take 1 tablet (60 mg total) by mouth daily, Disp: 90 tablet, Rfl: 2  •  Jardiance 25 MG TABS, TAKE 1 TABLET BY MOUTH IN  THE MORNING, Disp: 90 tablet, Rfl: 3  •  losartan (COZAAR) 100 MG tablet, Take 1 tablet (100 mg total) by mouth daily, Disp: 90 tablet, Rfl: 0  •  metFORMIN (GLUCOPHAGE) 500 mg tablet, Take 1 tablet (500 mg total) by mouth 2 (two) times a day with meals, Disp: 180 tablet, Rfl: 1  •  metoprolol succinate (TOPROL-XL) 50 mg 24 hr tablet, Take 1 tablet (50 mg total) by mouth daily, Disp: 90 tablet, Rfl: 3  •  Multiple Vitamins-Minerals (MULTIVITAMIN MEN 50+ PO), Take by mouth daily, Disp: , Rfl:   •  Omega-3 Fatty Acids (fish oil) 1,000 mg, Take 4,000 mg by mouth daily , Disp: , Rfl:   •  OneTouch Delica Lancets 96C MISC, Check blood sugars twice daily  Please substitute with appropriate alternative as covered by patient's insurance   Dx: E11 65, Disp: 200 each, Rfl: 3  •  sitaGLIPtin (JANUVIA) 100 mg tablet, Take 100 mg by mouth daily, Disp: , Rfl:     Laboratory Results:  Results for orders placed or performed in visit on 75/03/02   Basic metabolic panel   Result Value Ref Range    Sodium 139 135 - 147 mmol/L    Potassium 4 3 3 5 - 5 3 mmol/L    Chloride 108 96 - 108 mmol/L    CO2 26 21 - 32 mmol/L    ANION GAP 5 4 - 13 mmol/L    BUN 25 5 - 25 mg/dL    Creatinine 1 36 (H) 0 60 - 1 30 mg/dL    Glucose, Fasting 109 (H) 65 - 99 mg/dL    Calcium 9 6 8 3 - 10 1 mg/dL    eGFR 49 ml/min/1 73sq m

## 2022-11-30 NOTE — LETTER
November 30, 2022     Floyd Enriquez DO  [address]  [address] [facility] [address]    Patient: Tory Steven   YOB: 1945   Date of Visit: 11/30/2022       Dear Dr Sinai Montenegro:    Thank you for referring Tory Steven to me for evaluation  Below are my notes for this consultation  If you have questions, please do not hesitate to call me  I look forward to following your patient along with you  Sincerely,        Diana Granger MD        CC: No Recipients  Diana Granger MD  11/30/2022  8:47 AM  Sign when Signing Visit  NEPHROLOGY OFFICE PROGRESS NOTE   Tory Steven 76 y o  male MRN: [id number]  DATE: 11/30/22  Reason for visit: Continued evaluation and management of CKD    ASSESSMENT & PLAN:  1  Chronic kidney disease, stage III:  · [facility] baseline serum creatinine appears to be around 1 3-1 5  · The etiology of his renal disease is suspected to be hypertensive nephrosclerosis and diabetic kidney disease  · Renal function is stable  SCr 1 36    · Treatment/Management consists of controlling modifiable risk factors (good blood pressure, glycemic and lipid control) and avoidance of nephrotoxic medications in order to slow down progression of renal disease  2  Hypertension:  · BP is close to goal (<130/80)  · Current medications: HCTZ 12 5 mg daily, metoprolol succinate 50 mg daily, losartan 100 mg daily, amlodipine 10 mg daily, clonidine 0 1 mg twice a day, ISMN 60 mg daily  · No changes today since Clonidine was recently added  · Check home BP for 1 week prior to next visit  3  Coronary artery disease:  · Medical management recommended  · Follow up with Dr Adryan Coyle      4  Mineral bone disease:  · Check PTH, phosphorus, and vitamin-D     5  Gross hematuria:  · Has a urology appointment with Michael Gomez in December 2022  · Check UA  6  Microalbuminuria  · This is at goal   · Most recent mACR 56 in Oct 2022  · Continue losartan      7  Diabetes mellitus:  · Management is deferred to PCP  · Currently on insulin and Jardiance  8  Hyperlipidemia:  · On atorvastatin and fenofibrate    Patient Instructions   You have chronic kidney disease (CKD) and your kidney function is stable  I recommend no changes to your medications today  Follow up in 6 months - please obtain blood work 1-2 weeks prior to the appointment  Please check your BP twice daily for 1 week and bring a log with your next visit  Please avoid taking NSAIDs (Ibuprofen, Motrin, Aleve, Advil, Naproxen, Celebrex, etc )  Make sure you are eating healthy and have adequate exercise  SUBJECTIVE / INTERVAL HISTORY:  Violeta Diego is a 76year old gentleman who I am seeing for the 1st time in the office for continued evaluation of chronic kidney disease  He was seen by Dr Valentino Cid on initial consultation back in July 2022  He had a cardiac catheterization done in October 2022 and medical management was recommended for multivessel coronary artery disease  He has not had any hospitalizations or ER visits over the past 4 months  He denies any acute complaints  He does have exertional shortness of breath  He checks BP once a week - BP ranges in the 130s to 140s over 70s  He is not using NSAIDs  He recalls having an episode of gross hematuria months ago and is scheduled to see Urology at [facility] next month  PMH/PSH:   1  HTN: Diagnosed at least 20 years ago  No admissions for HTN urgency  Highest recalled SBP is > 180 mm Hg  2  DM: Diagnosed about 25 years ago  No DM retinopathy  He started insulin about 3-4 months ago  3  HLP: on atorvastatin  4  CAD s/p CABG 2002, s/p PCI  5  Prostate cancer s/p prostatectomy  6  AAA s/p EVAR  7  Nephrolithiasis  8  DDD s/p laminectomy   9  Cholecystectomy    No GERD, CVA, COPD, SUSAN, asthma, liver disease  Previous work up:   8/2/22 CT abdomen: 1 or more simple renal cysts  No hydronephrosis   Parenchymal thinning/scar at the R inferior pole  ALLERGIES:   Allergies   Allergen Reactions   • Lisinopril Rash and Lip Swelling     REVIEW OF SYSTEMS:  Review of Systems   Constitutional: Negative for appetite change, chills, fatigue and fever  Respiratory: Positive for shortness of breath (on exertion)  Negative for cough  Cardiovascular: Negative for chest pain and leg swelling  Gastrointestinal: Negative for abdominal pain, diarrhea, nausea and vomiting  Genitourinary: Positive for hematuria (gross hematuria)  Negative for dysuria  Musculoskeletal: Positive for back pain  Negative for arthralgias  Allergic/Immunologic: Negative for immunocompromised state  Neurological: Negative for dizziness and light-headedness  OBJECTIVE:  /64 (BP Location: Left arm, Patient Position: Sitting, Cuff Size: Large)   Pulse (!) 52   Ht 6' 2" (1 88 m)   Wt 114 kg (252 lb)   BMI 32 35 kg/m²   Current Weight:   Body mass index is 32 48 kg/m²  Physical Exam  Constitutional:       General: He is not in acute distress  Appearance: Normal appearance  He is well-developed and well-nourished  He is obese  He is not ill-appearing or diaphoretic  HENT:      Head: Normocephalic and atraumatic  Mouth/Throat:      Mouth: Mucous membranes are normal    Eyes:      General: No scleral icterus  Conjunctiva/sclera: Conjunctivae normal    Neck:      Vascular: No JVD  Cardiovascular:      Rate and Rhythm: Normal rate and regular rhythm  Heart sounds: Normal heart sounds  Pulmonary:      Effort: Pulmonary effort is normal  No respiratory distress  Breath sounds: Normal breath sounds  Abdominal:      General: Bowel sounds are normal       Palpations: Abdomen is soft  Musculoskeletal:         General: No edema  Cervical back: Neck supple  Right lower leg: No edema  Left lower leg: No edema  Skin:     General: Skin is warm and dry     Neurological:      Mental Status: He is alert and oriented to person, place, and time  Psychiatric:         Mood and Affect: Mood and affect normal          Behavior: Behavior normal        Medications:  Current Outpatient Medications:   •  amLODIPine (NORVASC) 10 mg tablet, Take 1 tablet (10 mg total) by mouth daily, Disp: 90 tablet, Rfl: 1  •  aspirin 81 mg chewable tablet, Chew 81 mg daily, Disp: , Rfl:   •  atorvastatin (LIPITOR) 40 mg tablet, Take 1 tablet (40 mg total) by mouth daily, Disp: 90 tablet, Rfl: 3  •  Blood Glucose Monitoring Suppl (OneTouch Verio Reflect) w/Device KIT, Check blood sugars twice daily  Please substitute with appropriate alternative as covered by patient's insurance  Dx: E11 65, Disp: 1 kit, Rfl: 0  •  cloNIDine (Catapres) 0 1 mg tablet, Take 1 tablet (0 1 mg total) by mouth every 12 (twelve) hours, Disp: 180 tablet, Rfl: 1  •  clopidogrel (PLAVIX) 75 mg tablet, Take 75 mg by mouth daily, Disp: , Rfl:   •  fenofibrate 160 MG tablet, Take 1 tablet (160 mg total) by mouth daily, Disp: 90 tablet, Rfl: 3  •  gabapentin (NEURONTIN) 600 MG tablet, TAKE 1 TABLET BY MOUTH  TWICE DAILY, Disp: 180 tablet, Rfl: 3  •  glimepiride (AMARYL) 2 mg tablet, Take 1 tablet (2 mg total) by mouth 2 (two) times a day, Disp: 180 tablet, Rfl: 1  •  glucose blood (OneTouch Verio) test strip, Check blood sugars twice daily  Please substitute with appropriate alternative as covered by patient's insurance   Dx: E11 65, Disp: 200 each, Rfl: 3  •  hydrochlorothiazide (HYDRODIURIL) 12 5 mg tablet, Take 1 tablet (12 5 mg total) by mouth daily, Disp: 30 tablet, Rfl: 0  •  Insulin Glargine Solostar (Lantus SoloStar) 100 UNIT/ML SOPN, Inject 0 2 mL (20 Units total) under the skin every evening, Disp: 15 mL, Rfl: 3  •  Insulin Pen Needle 32G X 4 MM MISC, Use every evening, Disp: 100 each, Rfl: 5  •  isosorbide mononitrate (IMDUR) 60 mg 24 hr tablet, Take 1 tablet (60 mg total) by mouth daily, Disp: 90 tablet, Rfl: 2  •  Jardiance 25 MG TABS, TAKE 1 TABLET BY MOUTH IN THE MORNING, Disp: 90 tablet, Rfl: 3  •  losartan (COZAAR) 100 MG tablet, Take 1 tablet (100 mg total) by mouth daily, Disp: 90 tablet, Rfl: 0  •  metFORMIN (GLUCOPHAGE) 500 mg tablet, Take 1 tablet (500 mg total) by mouth 2 (two) times a day with meals, Disp: 180 tablet, Rfl: 1  •  metoprolol succinate (TOPROL-XL) 50 mg 24 hr tablet, Take 1 tablet (50 mg total) by mouth daily, Disp: 90 tablet, Rfl: 3  •  Multiple Vitamins-Minerals (MULTIVITAMIN MEN 50+ PO), Take by mouth daily, Disp: , Rfl:   •  Omega-3 Fatty Acids (fish oil) 1,000 mg, Take 4,000 mg by mouth daily , Disp: , Rfl:   •  OneTouch Delica Lancets 79O MISC, Check blood sugars twice daily  Please substitute with appropriate alternative as covered by patient's insurance   Dx: E11 65, Disp: 200 each, Rfl: 3  •  sitaGLIPtin (JANUVIA) 100 mg tablet, Take 100 mg by mouth daily, Disp: , Rfl:     Laboratory Results:  Results for orders placed or performed in visit on 39/94/97   Basic metabolic panel   Result Value Ref Range    Sodium 139 135 - 147 mmol/L    Potassium 4 3 3 5 - 5 3 mmol/L    Chloride 108 96 - 108 mmol/L    CO2 26 21 - 32 mmol/L    ANION GAP 5 4 - 13 mmol/L    BUN 25 5 - 25 mg/dL    Creatinine 1 36 (H) 0 60 - 1 30 mg/dL    Glucose, Fasting 109 (H) 65 - 99 mg/dL    Calcium 9 6 8 3 - 10 1 mg/dL    eGFR 49 ml/min/1 73sq m

## 2022-11-30 NOTE — PATIENT INSTRUCTIONS
You have chronic kidney disease (CKD) and your kidney function is stable  I recommend no changes to your medications today  Follow up in 6 months - please obtain blood work 1-2 weeks prior to the appointment  Please check your BP twice daily for 1 week and bring a log with your next visit  Please avoid taking NSAIDs (Ibuprofen, Motrin, Aleve, Advil, Naproxen, Celebrex, etc )  Make sure you are eating healthy and have adequate exercise

## 2022-12-12 DIAGNOSIS — E11.42 TYPE 2 DIABETES MELLITUS WITH DIABETIC POLYNEUROPATHY, WITHOUT LONG-TERM CURRENT USE OF INSULIN (HCC): ICD-10-CM

## 2022-12-13 RX ORDER — INSULIN GLARGINE 100 [IU]/ML
20 INJECTION, SOLUTION SUBCUTANEOUS EVERY EVENING
Qty: 15 ML | Refills: 3 | Status: SHIPPED | OUTPATIENT
Start: 2022-12-13 | End: 2023-06-11

## 2022-12-21 ENCOUNTER — TELEPHONE (OUTPATIENT)
Dept: FAMILY MEDICINE CLINIC | Facility: CLINIC | Age: 77
End: 2022-12-21

## 2022-12-21 NOTE — TELEPHONE ENCOUNTER
Prior Auth on [employer] for renewal for [employer] [facility] [id number]  ID [id number]   Forms completed and approved until 12/31/2023  Ref#  [id number]  JAIDA sparks

## 2023-01-12 ENCOUNTER — APPOINTMENT (OUTPATIENT)
Dept: RADIOLOGY | Facility: CLINIC | Age: 78
End: 2023-01-12

## 2023-01-12 ENCOUNTER — HOSPITAL ENCOUNTER (EMERGENCY)
Facility: HOSPITAL | Age: 78
Discharge: HOME | End: 2023-01-12
Attending: EMERGENCY MEDICINE

## 2023-01-12 ENCOUNTER — APPOINTMENT (EMERGENCY)
Dept: RADIOLOGY | Facility: HOSPITAL | Age: 78
End: 2023-01-12

## 2023-01-12 ENCOUNTER — OFFICE VISIT (OUTPATIENT)
Dept: URGENT CARE | Facility: CLINIC | Age: 78
End: 2023-01-12

## 2023-01-12 VITALS
OXYGEN SATURATION: 97 % | SYSTOLIC BLOOD PRESSURE: 157 MMHG | RESPIRATION RATE: 16 BRPM | DIASTOLIC BLOOD PRESSURE: 66 MMHG | TEMPERATURE: 97.3 F | HEART RATE: 51 BPM | WEIGHT: 253.8 LBS | BODY MASS INDEX: 32.59 KG/M2

## 2023-01-12 VITALS
WEIGHT: 250 LBS | RESPIRATION RATE: 18 BRPM | HEART RATE: 52 BPM | DIASTOLIC BLOOD PRESSURE: 60 MMHG | TEMPERATURE: 95.9 F | BODY MASS INDEX: 32.08 KG/M2 | HEIGHT: 74 IN | OXYGEN SATURATION: 98 % | SYSTOLIC BLOOD PRESSURE: 120 MMHG

## 2023-01-12 DIAGNOSIS — T14.8XXA PUNCTURE WOUND: ICD-10-CM

## 2023-01-12 DIAGNOSIS — T14.8XXA PUNCTURE WOUND: Primary | ICD-10-CM

## 2023-01-12 LAB
ALBUMIN SERPL BCP-MCNC: 3.6 G/DL (ref 3.5–5)
ALP SERPL-CCNC: 46 U/L (ref 46–116)
ALT SERPL W P-5'-P-CCNC: 30 U/L (ref 12–78)
ANION GAP SERPL CALCULATED.3IONS-SCNC: 8 MMOL/L (ref 4–13)
AST SERPL W P-5'-P-CCNC: 19 U/L (ref 5–45)
BASOPHILS # BLD AUTO: 0.03 THOUSANDS/ÂΜL (ref 0–0.1)
BASOPHILS NFR BLD AUTO: 0 % (ref 0–1)
BILIRUB SERPL-MCNC: 0.34 MG/DL (ref 0.2–1)
BUN SERPL-MCNC: 38 MG/DL (ref 5–25)
CALCIUM SERPL-MCNC: 9.2 MG/DL (ref 8.3–10.1)
CHLORIDE SERPL-SCNC: 107 MMOL/L (ref 96–108)
CO2 SERPL-SCNC: 30 MMOL/L (ref 21–32)
CREAT SERPL-MCNC: 1.48 MG/DL (ref 0.6–1.3)
CRP SERPL QL: 1.9 MG/L
EOSINOPHIL # BLD AUTO: 0.22 THOUSAND/ÂΜL (ref 0–0.61)
EOSINOPHIL NFR BLD AUTO: 3 % (ref 0–6)
ERYTHROCYTE [DISTWIDTH] IN BLOOD BY AUTOMATED COUNT: 13.6 % (ref 11.6–15.1)
GFR SERPL CREATININE-BSD FRML MDRD: 44 ML/MIN/1.73SQ M
GLUCOSE SERPL-MCNC: 114 MG/DL (ref 65–140)
HCT VFR BLD AUTO: 41.1 % (ref 36.5–49.3)
HGB BLD-MCNC: 13.3 G/DL (ref 12–17)
IMM GRANULOCYTES # BLD AUTO: 0.02 THOUSAND/UL (ref 0–0.2)
IMM GRANULOCYTES NFR BLD AUTO: 0 % (ref 0–2)
LYMPHOCYTES # BLD AUTO: 1.79 THOUSANDS/ÂΜL (ref 0.6–4.47)
LYMPHOCYTES NFR BLD AUTO: 26 % (ref 14–44)
MCH RBC QN AUTO: 27.8 PG (ref 26.8–34.3)
MCHC RBC AUTO-ENTMCNC: 32.4 G/DL (ref 31.4–37.4)
MCV RBC AUTO: 86 FL (ref 82–98)
MONOCYTES # BLD AUTO: 0.56 THOUSAND/ÂΜL (ref 0.17–1.22)
MONOCYTES NFR BLD AUTO: 8 % (ref 4–12)
NEUTROPHILS # BLD AUTO: 4.3 THOUSANDS/ÂΜL (ref 1.85–7.62)
NEUTS SEG NFR BLD AUTO: 63 % (ref 43–75)
NRBC BLD AUTO-RTO: 0 /100 WBCS
PLATELET # BLD AUTO: 182 THOUSANDS/UL (ref 149–390)
PMV BLD AUTO: 10.4 FL (ref 8.9–12.7)
POTASSIUM SERPL-SCNC: 4.1 MMOL/L (ref 3.5–5.3)
PROT SERPL-MCNC: 7.5 G/DL (ref 6.4–8.4)
RBC # BLD AUTO: 4.78 MILLION/UL (ref 3.88–5.62)
SODIUM SERPL-SCNC: 145 MMOL/L (ref 135–147)
WBC # BLD AUTO: 6.92 THOUSAND/UL (ref 4.31–10.16)

## 2023-01-12 RX ORDER — CEFAZOLIN SODIUM 2 G/50ML
2000 SOLUTION INTRAVENOUS ONCE
Status: COMPLETED | OUTPATIENT
Start: 2023-01-12 | End: 2023-01-12

## 2023-01-12 RX ORDER — CEPHALEXIN 500 MG/1
500 CAPSULE ORAL 3 TIMES DAILY
Qty: 21 CAPSULE | Refills: 0 | Status: SHIPPED | OUTPATIENT
Start: 2023-01-12 | End: 2023-01-19

## 2023-01-12 RX ADMIN — CEFAZOLIN SODIUM 2000 MG: 2 SOLUTION INTRAVENOUS at 18:09

## 2023-01-12 NOTE — PROGRESS NOTES
[facility]        NAME: Harjeet Covarrubias is a 76 y o  male  : 1945    MRN: [id number]  DATE: 2023  TIME: 3:34 PM    Assessment and Plan   Puncture wound [T14  8XXA]  1  Puncture wound  XR foot 3+ vw right    Transfer to other facility        Puncture wound in the right foot that's 35 weeks old in the setting of DM2 raises concern for osteomyelitis versus necrotizing fasciitis  X-ray without obvious signs of either, but does not rule it out; official read pending  As such, patient sent to the ED for further evaluation management  Patient Instructions     Follow up with PCP in 3-5 days  Proceed to  ER if symptoms worsen  Chief Complaint     Chief Complaint   Patient presents with   • Puncture Wound     Stepped on nail while outside 1 1/2 weeks ago - didn't find it until later that evening after taking shoe off  Continues with pain on heel R foot when ambulates and shooting pain on/off RTC  Sl  Swelling and occ  Blood-tinged drainage noted on BA - using Neosporin  Last Td -          History of Present Illness       15-year-old male with pertinent history of DM 2 presents today due to concerns for an infected right heel wound  Punctured the right heel with a nail that passed through a shoe  Initially did not realize it thinking it was a pebble  Upon removing the shoe, he noticed a lot of bleeding and his sock and slipper  Td booster received 2 years ago  As such he applied Neosporin and a bandage to the area  This occurred about 1 5 weeks ago  Presents today due to new onset antalgic gait with tenderness and stabbing pains in the right foot  Reports some purulence on the bandage  Denies any fevers, chills, chest pain, dyspnea, dizziness or headache  Review of Systems   Review of Systems   Constitutional: Negative for chills and fever  Respiratory: Negative for shortness of breath  Cardiovascular: Negative for chest pain     Gastrointestinal: Negative for abdominal pain  Musculoskeletal: Positive for gait problem  Skin: Positive for wound  Neurological: Negative for dizziness and headaches  Current Medications       Current Outpatient Medications:   •  amLODIPine (NORVASC) 10 mg tablet, TAKE 1 TABLET BY MOUTH  DAILY, Disp: 90 tablet, Rfl: 3  •  aspirin 81 mg chewable tablet, Chew 81 mg daily, Disp: , Rfl:   •  atorvastatin (LIPITOR) 40 mg tablet, Take 1 tablet (40 mg total) by mouth daily, Disp: 90 tablet, Rfl: 3  •  Blood Glucose Monitoring Suppl (OneTouch Verio Reflect) w/Device KIT, Check blood sugars twice daily  Please substitute with appropriate alternative as covered by patient's insurance  Dx: E11 65, Disp: 1 kit, Rfl: 0  •  cloNIDine (Catapres) 0 1 mg tablet, Take 1 tablet (0 1 mg total) by mouth every 12 (twelve) hours, Disp: 180 tablet, Rfl: 1  •  clopidogrel (PLAVIX) 75 mg tablet, Take 75 mg by mouth daily, Disp: , Rfl:   •  fenofibrate 160 MG tablet, Take 1 tablet (160 mg total) by mouth daily, Disp: 90 tablet, Rfl: 3  •  gabapentin (NEURONTIN) 600 MG tablet, TAKE 1 TABLET BY MOUTH  TWICE DAILY, Disp: 180 tablet, Rfl: 3  •  glimepiride (AMARYL) 2 mg tablet, Take 1 tablet (2 mg total) by mouth 2 (two) times a day, Disp: 180 tablet, Rfl: 1  •  glucose blood (OneTouch Verio) test strip, Check blood sugars twice daily  Please substitute with appropriate alternative as covered by patient's insurance   Dx: E11 65, Disp: 200 each, Rfl: 3  •  hydrochlorothiazide (HYDRODIURIL) 12 5 mg tablet, Take 1 tablet (12 5 mg total) by mouth daily, Disp: 30 tablet, Rfl: 0  •  Insulin Glargine Solostar (Lantus SoloStar) 100 UNIT/ML SOPN, Inject 0 2 mL (20 Units total) under the skin every evening, Disp: 15 mL, Rfl: 3  •  Insulin Pen Needle 32G X 4 MM MISC, Use every evening, Disp: 100 each, Rfl: 5  •  isosorbide mononitrate (IMDUR) 60 mg 24 hr tablet, Take 1 tablet (60 mg total) by mouth daily, Disp: 90 tablet, Rfl: 2  •  Jardiance 25 MG TABS, TAKE 1 TABLET BY MOUTH IN  THE MORNING, Disp: 90 tablet, Rfl: 3  •  losartan (COZAAR) 100 MG tablet, TAKE 1 TABLET BY MOUTH  DAILY, Disp: 90 tablet, Rfl: 3  •  metFORMIN (GLUCOPHAGE) 500 mg tablet, Take 1 tablet (500 mg total) by mouth 2 (two) times a day with meals, Disp: 180 tablet, Rfl: 1  •  metoprolol succinate (TOPROL-XL) 50 mg 24 hr tablet, TAKE 1 TABLET BY MOUTH  DAILY, Disp: 90 tablet, Rfl: 3  •  Multiple Vitamins-Minerals (MULTIVITAMIN MEN 50+ PO), Take by mouth daily, Disp: , Rfl:   •  Omega-3 Fatty Acids (fish oil) 1,000 mg, Take 4,000 mg by mouth daily , Disp: , Rfl:   •  OneTouch Delica Lancets 16M MISC, Check blood sugars twice daily  Please substitute with appropriate alternative as covered by patient's insurance  Dx: E11 65, Disp: 200 each, Rfl: 3  •  sitaGLIPtin (JANUVIA) 100 mg tablet, Take 100 mg by mouth daily, Disp: , Rfl:     Current Allergies     Allergies as of 01/12/2023 - Reviewed 01/12/2023   Allergen Reaction Noted   • Lisinopril Rash and Lip Swelling 05/02/2021            The following portions of the patient's history were reviewed and updated as appropriate: allergies, current medications, past family history, past medical history, past social history, past surgical history and problem list      Past Medical History:   Diagnosis Date   • CAD (coronary artery disease)    • Cancer ([facility] [address] )     prostate   • CHF (congestive heart failure) ([facility] [address] )    • Diabetes mellitus ([facility] [address] )    • Myocardial infarction [facility])        Past Surgical History:   Procedure Laterality Date   • ADENOIDECTOMY     • CARDIAC CATHETERIZATION Left 10/19/2022    Procedure: Cardiac Left Heart Cath;  Surgeon: Gonzalo Romero MD;  [facility];   Service: Cardiology   • CARDIAC SURGERY  2002    3 cardiac bypass then angioplasty 7/2020   • CHOLECYSTECTOMY     • CORONARY ARTERY BYPASS GRAFT     • PROSTATE SURGERY     • TONSILLECTOMY         Family History   Problem Relation Age of Onset   • Diabetes Mother    • Alcohol abuse Father    • Mental illness Neg Hx          Medications have been verified  Objective   /60   Pulse (!) 52   Temp (!) 95 9 °F (35 5 °C)   Resp 18   Ht 6' 2" (1 88 m)   Wt 113 kg (250 lb)   SpO2 98%   BMI 32 10 kg/m²   No LMP for male patient  Physical Exam     Physical Exam  Vitals and nursing note reviewed  Constitutional:       General: He is in acute distress  Appearance: Normal appearance  He is not ill-appearing, toxic-appearing or diaphoretic  HENT:      Head: Normocephalic and atraumatic  Eyes:      General:         Right eye: No discharge  Left eye: No discharge  Conjunctiva/sclera: Conjunctivae normal    Pulmonary:      Effort: Pulmonary effort is normal    Musculoskeletal:         General: Tenderness (Exquisite tenderness on palpation of the arch sent to the puncture wound  No active bleeding  Unable to express any pus ) and signs of injury present  No swelling  Skin:     General: Skin is warm  Findings: Lesion (0 5cm hole on the plantar surface of the heel ) present  No erythema  Neurological:      General: No focal deficit present  Mental Status: He is alert and oriented to person, place, and time  Psychiatric:         Mood and Affect: Mood normal          Behavior: Behavior normal          Thought Content:  Thought content normal          Judgment: Judgment normal

## 2023-01-13 NOTE — ED PROVIDER NOTES
History  Chief Complaint   Patient presents with   • Foot Injury     Sent from [facility] for puncture wound right foot " rule out osteomyelitis vs necrotizing fascitis "     [facility] for evaluation of a puncture wound to his right foot  Patient has a history of diabetes and neuropathy in his feet  1 to 2 weeks ago he stepped on a nail puncturing his right foot  He did not notice that for years until he noticed the blood on his sock and then saw the nail in his work boot  Patient states his tetanus is up-to-date  He has been keeping the wound clean and reports no fever or chills  He does get intermittent sharp pains from the wound and is also tender to the touch  History provided by:  Patient   used: No        Prior to Admission Medications   Prescriptions Last Dose Informant Patient Reported? Taking? Blood Glucose Monitoring Suppl (OneTouch Verio Reflect) w/Device KIT   No No   Sig: Check blood sugars twice daily  Please substitute with appropriate alternative as covered by patient's insurance  Dx: E11 65   Insulin Glargine Solostar (Lantus SoloStar) 100 UNIT/ML SOPN   No No   Sig: Inject 0 2 mL (20 Units total) under the skin every evening   Insulin Pen Needle 32G X 4 MM MISC   No No   Sig: Use every evening   Jardiance 25 MG TABS   No No   Sig: TAKE 1 TABLET BY MOUTH IN  THE MORNING   Multiple Vitamins-Minerals (MULTIVITAMIN MEN 50+ PO)   Yes No   Sig: Take by mouth daily   Omega-3 Fatty Acids (fish oil) 1,000 mg   Yes No   Sig: Take 4,000 mg by mouth daily    OneTouch Delica Lancets 94T MISC   No No   Sig: Check blood sugars twice daily  Please substitute with appropriate alternative as covered by patient's insurance   Dx: E11 65   amLODIPine (NORVASC) 10 mg tablet   No No   Sig: TAKE 1 TABLET BY MOUTH  DAILY   aspirin 81 mg chewable tablet   Yes No   Sig: Chew 81 mg daily   atorvastatin (LIPITOR) 40 mg tablet   No No   Sig: Take 1 tablet (40 mg total) by mouth daily   cloNIDine (Catapres) 0 1 mg tablet   No No   Sig: Take 1 tablet (0 1 mg total) by mouth every 12 (twelve) hours   clopidogrel (PLAVIX) 75 mg tablet   Yes No   Sig: Take 75 mg by mouth daily   fenofibrate 160 MG tablet   No No   Sig: Take 1 tablet (160 mg total) by mouth daily   gabapentin (NEURONTIN) 600 MG tablet   No No   Sig: TAKE 1 TABLET BY MOUTH  TWICE DAILY   glimepiride (AMARYL) 2 mg tablet   No No   Sig: Take 1 tablet (2 mg total) by mouth 2 (two) times a day   glucose blood (OneTouch Verio) test strip   No No   Sig: Check blood sugars twice daily  Please substitute with appropriate alternative as covered by patient's insurance  Dx: E11 65   hydrochlorothiazide (HYDRODIURIL) 12 5 mg tablet   No No   Sig: Take 1 tablet (12 5 mg total) by mouth daily   isosorbide mononitrate (IMDUR) 60 mg 24 hr tablet   No No   Sig: Take 1 tablet (60 mg total) by mouth daily   losartan (COZAAR) 100 MG tablet   No No   Sig: TAKE 1 TABLET BY MOUTH  DAILY   metFORMIN (GLUCOPHAGE) 500 mg tablet   No No   Sig: Take 1 tablet (500 mg total) by mouth 2 (two) times a day with meals   metoprolol succinate (TOPROL-XL) 50 mg 24 hr tablet   No No   Sig: TAKE 1 TABLET BY MOUTH  DAILY   sitaGLIPtin (JANUVIA) 100 mg tablet   Yes No   Sig: Take 100 mg by mouth daily      Facility-Administered Medications: None       Past Medical History:   Diagnosis Date   • CAD (coronary artery disease)    • Cancer (HCC)     prostate   • CHF (congestive heart failure) ([facility] [address] )    • Diabetes mellitus ([facility] [address] )    • Myocardial infarction [facility])        Past Surgical History:   Procedure Laterality Date   • ADENOIDECTOMY     • CARDIAC CATHETERIZATION Left 10/19/2022    Procedure: Cardiac Left Heart Cath;  Surgeon: Gonzalo Romero MD;  [facility]: [facility];   Service: Cardiology   • CARDIAC SURGERY  2002    3 cardiac bypass then angioplasty 7/2020   • CHOLECYSTECTOMY     • CORONARY ARTERY BYPASS GRAFT     • PROSTATE SURGERY     • TONSILLECTOMY Family History   Problem Relation Age of Onset   • Diabetes Mother    • Alcohol abuse Father    • Mental illness Neg Hx      I have reviewed and agree with the history as documented  E-Cigarette/Vaping   • E-Cigarette Use Never User      E-Cigarette/Vaping Substances   • Nicotine No    • THC No    • CBD No    • Flavoring No    • Other No    • Unknown No      Social History     Tobacco Use   • Smoking status: Former     Types: Cigarettes     Quit date:      Years since quittin 0   • Smokeless tobacco: Never   Vaping Use   • Vaping Use: Never used   Substance Use Topics   • Alcohol use: Yes     Alcohol/week: 14 0 standard drinks     Types: 14 Cans of beer per week     Comment: 1 -2 daily   • Drug use: Never       Review of Systems   Constitutional: Negative for chills and fever  HENT: Negative for ear pain and sore throat  Eyes: Negative for pain and visual disturbance  Respiratory: Negative for cough and shortness of breath  Cardiovascular: Negative for chest pain and palpitations  Gastrointestinal: Negative for abdominal pain and vomiting  Genitourinary: Negative for dysuria and hematuria  Musculoskeletal: Negative for arthralgias and back pain  Skin: Positive for wound  Negative for color change and rash  Neurological: Negative for seizures and syncope  All other systems reviewed and are negative  Physical Exam  Physical Exam  Vitals and nursing note reviewed  Constitutional:       General: He is not in acute distress  HENT:      Head: Atraumatic  Right Ear: External ear normal       Left Ear: External ear normal       Nose: Nose normal       Mouth/Throat:      Mouth: Mucous membranes are moist       Pharynx: Oropharynx is clear  Eyes:      General: No scleral icterus  Conjunctiva/sclera: Conjunctivae normal    Cardiovascular:      Rate and Rhythm: Normal rate and regular rhythm  Pulses: Normal pulses     Pulmonary:      Effort: Pulmonary effort is normal  No respiratory distress  Breath sounds: Normal breath sounds  Musculoskeletal:         General: No deformity  Normal range of motion  Feet:    Skin:     Capillary Refill: Capillary refill takes less than 2 seconds  Findings: No rash  Neurological:      General: No focal deficit present  Mental Status: He is alert and oriented to person, place, and time               Vital Signs  ED Triage Vitals [01/12/23 1621]   Temperature Pulse Respirations Blood Pressure SpO2   (!) 97 3 °F (36 3 °C) (!) 51 16 157/66 97 %      Temp Source Heart Rate Source Patient Position - Orthostatic VS BP Location FiO2 (%)   Tympanic Monitor Sitting Left arm --      Pain Score       8           Vitals:    01/12/23 1621   BP: 157/66   Pulse: (!) 51   Patient Position - Orthostatic VS: Sitting         Visual Acuity      ED Medications  Medications   ceFAZolin (ANCEF) IVPB (premix in dextrose) 2,000 mg 50 mL (0 mg Intravenous Stopped 1/12/23 1830)       Diagnostic Studies  Results Reviewed     Procedure Component Value Units Date/Time    Comprehensive metabolic panel [[id number]]  (Abnormal) Collected: 01/12/23 1725    Lab Status: Final result Specimen: Blood from Arm, Left Updated: 01/12/23 1750     Sodium 145 mmol/L      Potassium 4 1 mmol/L      Chloride 107 mmol/L      CO2 30 mmol/L      ANION GAP 8 mmol/L      BUN 38 mg/dL      Creatinine 1 48 mg/dL      Glucose 114 mg/dL      Calcium 9 2 mg/dL      AST 19 U/L      ALT 30 U/L      Alkaline Phosphatase 46 U/L      Total Protein 7 5 g/dL      Albumin 3 6 g/dL      Total Bilirubin 0 34 mg/dL      eGFR 44 ml/min/1 73sq m     Narrative:      [place] guidelines for Chronic Kidney Disease (CKD):   •  Stage 1 with normal or high GFR (GFR > 90 mL/min/1 73 square meters)  •  Stage 2 Mild CKD (GFR = 60-89 mL/min/1 73 square meters)  •  Stage 3A Moderate CKD (GFR = 45-59 mL/min/1 73 square meters)  •  Stage 3B Moderate CKD (GFR = 30-44 mL/min/1 73 square meters)  •  Stage 4 Severe CKD (GFR = 15-29 mL/min/1 73 square meters)  •  Stage 5 End Stage CKD (GFR <15 mL/min/1 73 square meters)  Note: GFR calculation is accurate only with a steady state creatinine    C-reactive protein [[id number]]  (Normal) Collected: 01/12/23 1725    Lab Status: Final result Specimen: Blood from Arm, Left Updated: 01/12/23 1745     CRP 1 9 mg/L     CBC and differential [[id number]] Collected: 01/12/23 1725    Lab Status: Final result Specimen: Blood from Arm, Left Updated: 01/12/23 1731     WBC 6 92 Thousand/uL      RBC 4 78 Million/uL      Hemoglobin 13 3 g/dL      Hematocrit 41 1 %      MCV 86 fL      MCH 27 8 pg      MCHC 32 4 g/dL      RDW 13 6 %      MPV 10 4 fL      Platelets 057 Thousands/uL      nRBC 0 /100 WBCs      Neutrophils Relative 63 %      Immat GRANS % 0 %      Lymphocytes Relative 26 %      Monocytes Relative 8 %      Eosinophils Relative 3 %      Basophils Relative 0 %      Neutrophils Absolute 4 30 Thousands/µL      Immature Grans Absolute 0 02 Thousand/uL      Lymphocytes Absolute 1 79 Thousands/µL      Monocytes Absolute 0 56 Thousand/µL      Eosinophils Absolute 0 22 Thousand/µL      Basophils Absolute 0 03 Thousands/µL                  No orders to display              Procedures  Procedures         ED Course                                             Medical Decision Making  Pulse ox 97% on room air indicating adequate oxygenation  X-ray was performed at the urgent care and the reading was no radiopaque foreign body or signs of osteomyelitis  Lab work performed showed normal CBC and CRP  We will start patient on antibiotics and follow-up with his podiatrist Dr Nadia Suárez    Puncture wound: complicated acute illness or injury     Details: Acute puncture wound of the right foot complicated by history of diabetes and peripheral neuropathy  Amount and/or Complexity of Data Reviewed  Labs: ordered        Risk  Prescription drug management  Disposition  Final diagnoses:   Puncture wound - right foot     Time reflects when diagnosis was documented in both MDM as applicable and the Disposition within this note     Time User Action Codes Description Comment    1/12/2023  6:23 PM Bobby, 82Jessica Paulnyasia Rosa Isela  8XXA] Puncture wound     1/12/2023  6:23 PM Aryan Rosales  8XXA] Puncture wound right foot      ED Disposition     ED Disposition   Discharge    Condition   Stable    Date/Time   Thu Jan 12, 2023  6:23 PM    Comment   Patricia Banks discharge to home/self care  Follow-up Information     Follow up With Specialties Details Why [address], DPM Podiatry In 1 week  [address] [id number] [phone]            Discharge Medication List as of 1/12/2023  6:24 PM      START taking these medications    Details   cephalexin (KEFLEX) 500 mg capsule Take 1 capsule (500 mg total) by mouth 3 (three) times a day for 7 days, Starting Thu 1/12/2023, Until Thu 1/19/2023, Normal         CONTINUE these medications which have NOT CHANGED    Details   amLODIPine (NORVASC) 10 mg tablet TAKE 1 TABLET BY MOUTH  DAILY, Normal      aspirin 81 mg chewable tablet Chew 81 mg daily, Historical Med      atorvastatin (LIPITOR) 40 mg tablet Take 1 tablet (40 mg total) by mouth daily, Starting Tue 5/10/2022, Normal      Blood Glucose Monitoring Suppl (OneTouch Verio Reflect) w/Device KIT Check blood sugars twice daily  Please substitute with appropriate alternative as covered by patient's insurance   Dx: E11 65, Normal      cloNIDine (Catapres) 0 1 mg tablet Take 1 tablet (0 1 mg total) by mouth every 12 (twelve) hours, Starting Fri 11/11/2022, Until Wed 5/10/2023, Normal      clopidogrel (PLAVIX) 75 mg tablet Take 75 mg by mouth daily, Historical Med      fenofibrate 160 MG tablet Take 1 tablet (160 mg total) by mouth daily, Starting Tue 5/10/2022, Normal      gabapentin (NEURONTIN) 600 MG tablet TAKE 1 TABLET BY MOUTH  TWICE DAILY, Normal      glimepiride (AMARYL) 2 mg tablet Take 1 tablet (2 mg total) by mouth 2 (two) times a day, Starting Mon 8/22/2022, Normal      glucose blood (OneTouch Verio) test strip Check blood sugars twice daily  Please substitute with appropriate alternative as covered by patient's insurance  Dx: E11 65, Normal      hydrochlorothiazide (HYDRODIURIL) 12 5 mg tablet Take 1 tablet (12 5 mg total) by mouth daily, Starting Thu 7/21/2022, Normal      Insulin Glargine Solostar (Lantus SoloStar) 100 UNIT/ML SOPN Inject 0 2 mL (20 Units total) under the skin every evening, Starting Tue 12/13/2022, Until Sun 6/11/2023, Normal      Insulin Pen Needle 32G X 4 MM MISC Use every evening, Starting Fri 7/22/2022, Normal      isosorbide mononitrate (IMDUR) 60 mg 24 hr tablet Take 1 tablet (60 mg total) by mouth daily, Starting Thu 11/10/2022, Normal      Jardiance 25 MG TABS TAKE 1 TABLET BY MOUTH IN  THE MORNING, Normal      losartan (COZAAR) 100 MG tablet TAKE 1 TABLET BY MOUTH  DAILY, Normal      metFORMIN (GLUCOPHAGE) 500 mg tablet Take 1 tablet (500 mg total) by mouth 2 (two) times a day with meals, Starting Fri 7/22/2022, Until Wed 1/18/2023, Normal      metoprolol succinate (TOPROL-XL) 50 mg 24 hr tablet TAKE 1 TABLET BY MOUTH  DAILY, Normal      Multiple Vitamins-Minerals (MULTIVITAMIN MEN 50+ PO) Take by mouth daily, Historical Med      Omega-3 Fatty Acids (fish oil) 1,000 mg Take 4,000 mg by mouth daily , Historical Med      OneTouch Delica Lancets 55X MISC Check blood sugars twice daily  Please substitute with appropriate alternative as covered by patient's insurance  Dx: E11 65, Normal      sitaGLIPtin (JANUVIA) 100 mg tablet Take 100 mg by mouth daily, Historical Med             No discharge procedures on file      PDMP Review       Value Time User    PDMP Reviewed  Yes 2/17/2022  4:22  [facility]           ED Provider  Electronically Signed by           Magdi Moeller DO  01/12/23 0371

## 2023-01-18 ENCOUNTER — OFFICE VISIT (OUTPATIENT)
Dept: PODIATRY | Facility: CLINIC | Age: 78
End: 2023-01-18

## 2023-01-18 ENCOUNTER — LAB REQUISITION (OUTPATIENT)
Dept: LAB | Facility: HOSPITAL | Age: 78
End: 2023-01-18

## 2023-01-18 VITALS
RESPIRATION RATE: 17 BRPM | WEIGHT: 253 LBS | SYSTOLIC BLOOD PRESSURE: 157 MMHG | HEIGHT: 74 IN | BODY MASS INDEX: 32.47 KG/M2 | DIASTOLIC BLOOD PRESSURE: 66 MMHG

## 2023-01-18 DIAGNOSIS — S91.331A PUNCTURE WOUND WITHOUT FOREIGN BODY, RIGHT FOOT, INITIAL ENCOUNTER: ICD-10-CM

## 2023-01-18 DIAGNOSIS — S91.331A PUNCTURE WOUND OF RIGHT FOOT, INITIAL ENCOUNTER: Primary | ICD-10-CM

## 2023-01-18 NOTE — PROGRESS NOTES
Assessment/Plan:     puncture wound probed, hyperkeratotic cap debrided, discussed the patient's signs of infection, patient continue oral antibiotics, culture collected, patient will use Iodosorb for dressing changes, patient to monitor condition, patient return next week for re-evaluation, possible MRI if patient condition is not improved to evaluate for osteomyelitis or deep abscess formation  Currently no fluctuance, no ascending cellulitis     Diagnoses and all orders for this visit:    Puncture wound of right foot, initial encounter  -     cadexomer iodine (IODOSORB) 0 9 % gel; Apply 1 application topically daily as needed for wound care          Subjective:      Patient ID: Luly Cline is a [de-identified] y o  male  25-year-old male past medical significant of diabetes mellitus, A1c 6 8, referred from the emergency room for a follow-up on puncture wound right foot, patient is currently on Keflex, patient report marked pain to the area, patient denies constitutional symptoms      The following portions of the patient's history were reviewed and updated as appropriate: allergies, current medications, past family history, past medical history, past social history, past surgical history and problem list     Review of Systems   Constitutional: Negative  Respiratory: Negative  Cardiovascular: Negative  Musculoskeletal: Negative  Skin: Positive for color change and wound  Historical Information   Past Medical History:   Diagnosis Date   • CAD (coronary artery disease)    • Cancer ([facility] [address] )     prostate   • CHF (congestive heart failure) ([facility] [address] )    • Diabetes mellitus ([facility] [address] )    • Myocardial infarction [facility])      Past Surgical History:   Procedure Laterality Date   • ADENOIDECTOMY     • CARDIAC CATHETERIZATION Left 10/19/2022    Procedure: Cardiac Left Heart Cath;  Surgeon: Lencho Romero MD;  [facility]: [facility];   Service: Cardiology   • CARDIAC SURGERY  2002    3 cardiac bypass then angioplasty 2020   • CHOLECYSTECTOMY     • CORONARY ARTERY BYPASS GRAFT     • PROSTATE SURGERY     • TONSILLECTOMY       Social History   Social History     Substance and Sexual Activity   Alcohol Use Yes   • Alcohol/week: 14 0 standard drinks   • Types: 14 Cans of beer per week    Comment: 1 -2 daily     Social History     Substance and Sexual Activity   Drug Use Never     Social History     Tobacco Use   Smoking Status Former   • Types: Cigarettes   • Quit date:    • Years since quittin 0   Smokeless Tobacco Never     Family History:   Family History   Problem Relation Age of Onset   • Diabetes Mother    • Alcohol abuse Father    • Mental illness Neg Hx        Meds/Allergies   all medications and allergies reviewed  Allergies   Allergen Reactions   • Lisinopril Rash and Lip Swelling       Objective:      /66   Resp 17   Ht 6' 2" (1 88 m)   Wt 115 kg (253 lb)   BMI 32 48 kg/m²          Physical Exam  Constitutional:       General: He is not in acute distress  Appearance: He is well-developed  He is not ill-appearing, toxic-appearing or diaphoretic  HENT:      Head: Normocephalic  Cardiovascular:      Pulses:           Dorsalis pedis pulses are 1+ on the right side and 1+ on the left side  Posterior tibial pulses are 0 on the right side and 0 on the left side  Comments: Palpable DP pulse, nonpalpable PT pulse, CFT is less than 3 seconds, temperature gradient within normal limit, a trophic skin changes noted with skin thinning in shiny, patient report occasional claudication to bilateral calf, localized edema foot and ankle Q9  Pulmonary:      Effort: Pulmonary effort is normal    Musculoskeletal:         General: Tenderness and deformity present  Comments: Pes planus type foot pain on palpation and range of motion of the 1st MPJ, metatarsal heads bilateral foot, pain on palpation on range of motion of the ST joint, crepitus noted in midfoot joint     Skin:     General: Skin is dry  Capillary Refill: Capillary refill takes 2 to 3 seconds  Comments: Trophic skin changes bilateral foot and ankle, alternating hypopigmentation and hyperpigmentation patches around the foot and ankle on anterior leg, skin is shiny and thin, absent hair growth, atrophy of fat pad      puncture wound noted plantar right heel around the insertion of the plantar fascia, will localized edema, no drainage, painful upon palpation with hyperkeratotic cap   Neurological:      Mental Status: He is alert and oriented to person, place, and time  Sensory: Sensory deficit present  Motor: Atrophy present  Deep Tendon Reflexes: Reflexes abnormal       Foot Exam    Right Foot/Ankle     Neurovascular  Dorsalis pedis: 1+  Posterior tibial: 0      Left Foot/Ankle      Neurovascular  Dorsalis pedis: 1+  Posterior tibial: 0              Portions of the record may have been created with voice recognition software  Occasional wrong word or "sound a like" substitutions may have occurred due to the inherent limitations of voice recognition software  Read the chart carefully and recognize, using context, where substitutions have occurred

## 2023-01-20 LAB
BACTERIA WND AEROBE CULT: ABNORMAL
BACTERIA WND AEROBE CULT: ABNORMAL
GRAM STN SPEC: ABNORMAL

## 2023-01-26 ENCOUNTER — OFFICE VISIT (OUTPATIENT)
Dept: PODIATRY | Facility: CLINIC | Age: 78
End: 2023-01-26

## 2023-01-26 VITALS
SYSTOLIC BLOOD PRESSURE: 157 MMHG | BODY MASS INDEX: 32.47 KG/M2 | HEIGHT: 74 IN | WEIGHT: 253 LBS | DIASTOLIC BLOOD PRESSURE: 66 MMHG | RESPIRATION RATE: 17 BRPM

## 2023-01-26 DIAGNOSIS — S91.331A PUNCTURE WOUND OF RIGHT FOOT, INITIAL ENCOUNTER: Primary | ICD-10-CM

## 2023-01-26 DIAGNOSIS — E11.42 DIABETIC POLYNEUROPATHY ASSOCIATED WITH TYPE 2 DIABETES MELLITUS (HCC): ICD-10-CM

## 2023-01-26 RX ORDER — DOXYCYCLINE HYCLATE 100 MG/1
100 CAPSULE ORAL EVERY 12 HOURS SCHEDULED
Qty: 20 CAPSULE | Refills: 0 | Status: SHIPPED | OUTPATIENT
Start: 2023-01-26 | End: 2023-02-05

## 2023-01-30 ENCOUNTER — OFFICE VISIT (OUTPATIENT)
Dept: PODIATRY | Facility: CLINIC | Age: 78
End: 2023-01-30

## 2023-01-30 VITALS — RESPIRATION RATE: 17 BRPM | WEIGHT: 253 LBS | BODY MASS INDEX: 32.47 KG/M2 | HEIGHT: 74 IN

## 2023-01-30 DIAGNOSIS — E11.42 DIABETIC POLYNEUROPATHY ASSOCIATED WITH TYPE 2 DIABETES MELLITUS (HCC): Primary | ICD-10-CM

## 2023-01-30 DIAGNOSIS — B35.1 ONYCHOMYCOSIS: ICD-10-CM

## 2023-01-30 DIAGNOSIS — M79.671 PAIN IN BOTH FEET: ICD-10-CM

## 2023-01-30 DIAGNOSIS — M79.672 PAIN IN BOTH FEET: ICD-10-CM

## 2023-01-30 DIAGNOSIS — I70.209 PERIPHERAL ARTERIOSCLEROSIS (HCC): ICD-10-CM

## 2023-01-30 NOTE — PROGRESS NOTES
Assessment/Plan:  Metatarsalgia secondary to diabetic neuropathy   Mycosis of nail and skin   Radiculopathy   Pain upon ambulation   Mild peripheral artery disease      Plan   Patient educated on condition   All nails debrided without pain or complication   Patient remain on gabapentin as directed          Diagnoses and all orders for this visit:     Radiculopathy of lumbar region  -     gabapentin (NEURONTIN) 600 MG tablet; Take 1 tablet (600 mg total) by mouth 2 (two) times a day     Dermatophytosis     Tinea pedis of both feet     Diabetic polyneuropathy associated with type 2 diabetes mellitus ([facility] [address] )  -     gabapentin (NEURONTIN) 600 MG tablet; Take 1 tablet (600 mg total) by mouth 2 (two) times a day     Onychomycosis     Peripheral arteriosclerosis (HCC)     Pain in both feet     Metatarsalgia of both feet  -     gabapentin (NEURONTIN) 600 MG tablet;  Take 1 tablet (600 mg total) by mouth 2 (two) times a day            Subjective:  Patient is doing better on present gabapentin regimen   He complains of pain in his toes with ambulation and shoe wear   No history of trauma             Allergies   Allergen Reactions   • Lisinopril Rash and Lip Swelling            Current Outpatient Medications:   •  amLODIPine (NORVASC) 10 mg tablet, Take 1 tablet (10 mg total) by mouth daily, Disp: 90 tablet, Rfl: 1  •  aspirin 81 mg chewable tablet, Chew 81 mg daily, Disp: , Rfl:   •  atorvastatin (LIPITOR) 40 mg tablet, Take 40 mg by mouth daily, Disp: , Rfl:   •  canagliflozin (Invokana) 100 mg, Take 100 mg by mouth daily before breakfast , Disp: , Rfl:   •  clopidogrel (PLAVIX) 75 mg tablet, Take 75 mg by mouth daily, Disp: , Rfl:   •  gabapentin (NEURONTIN) 600 MG tablet, Take 1 tablet (600 mg total) by mouth 2 (two) times a day, Disp: 180 tablet, Rfl: 0  •  glimepiride (AMARYL) 2 mg tablet, Take 2 mg by mouth every morning before breakfast, Disp: , Rfl:   •  hydrochlorothiazide (HYDRODIURIL) 25 mg tablet, Take 25 mg by mouth daily, Disp: , Rfl:   •  losartan (COZAAR) 50 mg tablet, Take 2 tablets (100 mg total) by mouth daily, Disp: 90 tablet, Rfl: 3  •  metFORMIN (GLUCOPHAGE) 1000 MG tablet, Take 1 tablet (1,000 mg total) by mouth 2 (two) times a day with meals, Disp: 180 tablet, Rfl: 0  •  metoprolol succinate (TOPROL-XL) 50 mg 24 hr tablet, Take 1 tablet (50 mg total) by mouth daily, Disp: 90 tablet, Rfl: 3  •  Multiple Vitamins-Minerals (MULTIVITAMIN MEN 50+ PO), Take by mouth daily, Disp: , Rfl:   •  Omega-3 Fatty Acids (fish oil) 1,000 mg, Take 4,000 mg by mouth daily , Disp: , Rfl:   •  sitaGLIPtin (JANUVIA) 100 mg tablet, Take 100 mg by mouth daily, Disp: , Rfl:            Patient Active Problem List   Diagnosis   • Mixed hyperlipidemia   • Essential hypertension   • Coronary artery disease involving native coronary artery of native heart without angina pectoris   • S/P angioplasty with stent   • S/P CABG x 3   • S/P AAA repair   • S/P prostatectomy   • H/O prostate cancer   • Type 2 diabetes mellitus with diabetic polyneuropathy, without long-term current use of insulin (HCC)   • Varicose veins of left lower extremity   • History of endovascular stent graft for abdominal aortic aneurysm (AAA)   • Bruit (arterial)   • Peripheral artery disease ([facility] [address] )             Patient ID: Thomas Horne is a 77 y  o  male      HPI     The following portions of the patient's history were reviewed and updated as appropriate:      family history includes Alcohol abuse in his father; Diabetes in his mother        reports that he quit smoking about 34 years ago  His smoking use included cigarettes  He has never used smokeless tobacco  He reports current alcohol use of about 14 0 standard drinks of alcohol per week   He reports that he does not use drugs         Objective:  Patient's shoes and socks removed    Foot ExamPhysical Exam          General  General Appearance: appears stated age and healthy   Orientation: alert and oriented to person, place, and time   Affect: appropriate   Gait: antalgic         Right Foot/Ankle      Inspection and Palpation  Tenderness: metatarsals   Swelling: none   Arch: pes planus  Claw Toes: fifth toe  Hallux limitus: yes  Skin Exam: callus, dry skin and tinea;      Neurovascular  Dorsalis pedis: 1+  Posterior tibial: 1+  Saphenous nerve sensation: absent  Tibial nerve sensation: absent  Superficial peroneal nerve sensation: absent  Deep peroneal nerve sensation: absent  Sural nerve sensation: absent        Left Foot/Ankle       Inspection and Palpation  Tenderness: metatarsals   Swelling: none   Arch: pes planus  Claw toes: second toe and fifth toe  Hallux limitus: yes  Skin Exam: callus, dry skin and tinea;      Neurovascular  Dorsalis pedis: 1+  Posterior tibial: 1+  Saphenous nerve sensation: absent  Tibial nerve sensation: absent  Superficial peroneal nerve sensation: absent  Deep peroneal nerve sensation: absent  Sural nerve sensation: absent           Physical Exam  Vitals signs and nursing note reviewed  Cardiovascular:      Rate and Rhythm: Normal rate and regular rhythm       Pulses:           Dorsalis pedis pulses are 1+ on the right side and 1+ on the left side         Posterior tibial pulses are 1+ on the right side and 1+ on the left side  Feet:      Right foot:      Skin integrity: Callus and dry skin present       Left foot:      Skin integrity: Callus and dry skin present  Skin:     Capillary Refill: Capillary refill takes 2 to 3 seconds       Comments:   All nails are thick and mycotic   Patient demonstrates bilateral dermatophytosis  Helen Thakur is a pre ulcerative/ corn on the plantar aspect 2nd left toe     Right Foot/Ankle   Right Foot Inspection  Skin Exam: dry skin, callus and callus                                 Vascular     The right DP pulse is 1+  The right PT pulse is 1+     Right Toe  - Comprehensive Exam  Arch: pes planus  Claw Toes: fifth toe  Hallux limitus: yes  Swelling: none   Tenderness: metatarsals            Left Foot/Ankle  Left Foot Inspection  Skin Exam: dry skin and callus                                             Vascular     The left DP pulse is 1+  The left PT pulse is 1+  Left Toe  - Comprehensive Exam  Arch: pes planus  Claw toes: second toe and fifth toe  Hallux limitus: yes  Swelling: none   Tenderness: metatarsals      Patient's shoes and socks removed  Assign Risk Category:  Deformity present; Loss of protective sensation; Weak pulses       Risk: 2

## 2023-02-09 DIAGNOSIS — E11.42 TYPE 2 DIABETES MELLITUS WITH DIABETIC POLYNEUROPATHY, WITHOUT LONG-TERM CURRENT USE OF INSULIN (HCC): ICD-10-CM

## 2023-02-09 DIAGNOSIS — E11.42 DIABETIC POLYNEUROPATHY ASSOCIATED WITH TYPE 2 DIABETES MELLITUS (HCC): ICD-10-CM

## 2023-02-09 DIAGNOSIS — M77.42 METATARSALGIA OF BOTH FEET: ICD-10-CM

## 2023-02-09 DIAGNOSIS — M77.41 METATARSALGIA OF BOTH FEET: ICD-10-CM

## 2023-02-09 DIAGNOSIS — M54.16 RADICULOPATHY OF LUMBAR REGION: ICD-10-CM

## 2023-02-10 RX ORDER — GABAPENTIN 600 MG/1
TABLET ORAL
Qty: 180 TABLET | Refills: 3 | Status: SHIPPED | OUTPATIENT
Start: 2023-02-10

## 2023-02-23 ENCOUNTER — OFFICE VISIT (OUTPATIENT)
Dept: PODIATRY | Facility: CLINIC | Age: 78
End: 2023-02-23

## 2023-02-23 VITALS — HEIGHT: 74 IN | WEIGHT: 253 LBS | RESPIRATION RATE: 17 BRPM | BODY MASS INDEX: 32.47 KG/M2

## 2023-02-23 DIAGNOSIS — E11.42 DIABETIC POLYNEUROPATHY ASSOCIATED WITH TYPE 2 DIABETES MELLITUS (HCC): ICD-10-CM

## 2023-02-23 DIAGNOSIS — E11.621 DIABETIC ULCER OF RIGHT HEEL ASSOCIATED WITH TYPE 2 DIABETES MELLITUS, LIMITED TO BREAKDOWN OF SKIN (HCC): Primary | ICD-10-CM

## 2023-02-23 DIAGNOSIS — L97.411 DIABETIC ULCER OF RIGHT HEEL ASSOCIATED WITH TYPE 2 DIABETES MELLITUS, LIMITED TO BREAKDOWN OF SKIN (HCC): Primary | ICD-10-CM

## 2023-02-23 DIAGNOSIS — I70.209 PERIPHERAL ARTERIOSCLEROSIS (HCC): ICD-10-CM

## 2023-02-23 NOTE — PROGRESS NOTES
Assessment/Plan:    Diabetic foot ulcer  History of puncture wound right heel  Diabetic neuropathy  Mild peripheral artery disease  Plan  Foot exam performed  Chart reviewed  Patient advised on condition  Today wound debrided  Utilizing 10 blade excisional debridement done  All devitalized tissue removed from wound  No evidence of cellulitis  Gentian violet applied  Maxorb and dry sterile dressing applied  Patient advised on aftercare  After 3 days he can return to SilvaSorb and bandage  Watch for signs of infection return for follow-up         Diagnoses and all orders for this visit:    Diabetic ulcer of right heel associated with type 2 diabetes mellitus, limited to breakdown of skin ([facility] [address] )    Diabetic polyneuropathy associated with type 2 diabetes mellitus ([facility] [address] )    Peripheral arteriosclerosis ([facility] [address] )          Subjective: Patient has pain in his right heel  He has history of stepping on nail  Patient is diabetic  No history of fever or night sweats    Allergies   Allergen Reactions   • Lisinopril Rash and Lip Swelling         Current Outpatient Medications:   •  amLODIPine (NORVASC) 10 mg tablet, TAKE 1 TABLET BY MOUTH  DAILY, Disp: 90 tablet, Rfl: 3  •  aspirin 81 mg chewable tablet, Chew 81 mg daily, Disp: , Rfl:   •  atorvastatin (LIPITOR) 40 mg tablet, Take 1 tablet (40 mg total) by mouth daily, Disp: 90 tablet, Rfl: 3  •  Blood Glucose Monitoring Suppl (OneTouch Verio Reflect) w/Device KIT, Check blood sugars twice daily  Please substitute with appropriate alternative as covered by patient's insurance   Dx: E11 65, Disp: 1 kit, Rfl: 0  •  cadexomer iodine (IODOSORB) 0 9 % gel, Apply 1 application topically daily as needed for wound care, Disp: 10 g, Rfl: 0  •  cloNIDine (Catapres) 0 1 mg tablet, Take 1 tablet (0 1 mg total) by mouth every 12 (twelve) hours, Disp: 180 tablet, Rfl: 1  •  clopidogrel (PLAVIX) 75 mg tablet, Take 75 mg by mouth daily, Disp: , Rfl:   •  fenofibrate 160 MG tablet, Take 1 tablet (160 mg total) by mouth daily, Disp: 90 tablet, Rfl: 3  •  gabapentin (NEURONTIN) 600 MG tablet, TAKE 1 TABLET BY MOUTH  TWICE DAILY, Disp: 180 tablet, Rfl: 3  •  glimepiride (AMARYL) 2 mg tablet, Take 1 tablet (2 mg total) by mouth 2 (two) times a day, Disp: 180 tablet, Rfl: 1  •  glucose blood (OneTouch Verio) test strip, Check blood sugars twice daily  Please substitute with appropriate alternative as covered by patient's insurance  Dx: E11 65, Disp: 200 each, Rfl: 3  •  hydrochlorothiazide (HYDRODIURIL) 12 5 mg tablet, Take 1 tablet (12 5 mg total) by mouth daily, Disp: 30 tablet, Rfl: 0  •  Insulin Glargine Solostar (Lantus SoloStar) 100 UNIT/ML SOPN, Inject 0 2 mL (20 Units total) under the skin every evening, Disp: 15 mL, Rfl: 3  •  Insulin Pen Needle 32G X 4 MM MISC, Use every evening, Disp: 100 each, Rfl: 5  •  isosorbide mononitrate (IMDUR) 60 mg 24 hr tablet, Take 1 tablet (60 mg total) by mouth daily, Disp: 90 tablet, Rfl: 2  •  Jardiance 25 MG TABS, TAKE 1 TABLET BY MOUTH IN  THE MORNING, Disp: 90 tablet, Rfl: 3  •  losartan (COZAAR) 100 MG tablet, TAKE 1 TABLET BY MOUTH  DAILY, Disp: 90 tablet, Rfl: 3  •  metFORMIN (GLUCOPHAGE) 500 mg tablet, TAKE 1 TABLET BY MOUTH  TWICE DAILY WITH MEALS, Disp: 180 tablet, Rfl: 3  •  metoprolol succinate (TOPROL-XL) 50 mg 24 hr tablet, TAKE 1 TABLET BY MOUTH  DAILY, Disp: 90 tablet, Rfl: 3  •  Multiple Vitamins-Minerals (MULTIVITAMIN MEN 50+ PO), Take by mouth daily, Disp: , Rfl:   •  Omega-3 Fatty Acids (fish oil) 1,000 mg, Take 4,000 mg by mouth daily , Disp: , Rfl:   •  OneTouch Delica Lancets 84C MISC, Check blood sugars twice daily  Please substitute with appropriate alternative as covered by patient's insurance   Dx: E11 65, Disp: 200 each, Rfl: 3  •  sitaGLIPtin (JANUVIA) 100 mg tablet, Take 100 mg by mouth daily, Disp: , Rfl:     Patient Active Problem List   Diagnosis   • Mixed hyperlipidemia   • Benign hypertension with CKD (chronic kidney disease) stage III ([facility] [address] )   • Coronary artery disease involving native coronary artery of native heart without angina pectoris   • S/P angioplasty with stent   • S/P CABG x 3   • S/P AAA repair   • S/P prostatectomy   • H/O prostate cancer   • Type 2 diabetes mellitus with diabetic polyneuropathy, without long-term current use of insulin (HCC)   • Varicose veins of left lower extremity   • History of endovascular stent graft for abdominal aortic aneurysm (AAA)   • Bruit (arterial)   • Peripheral artery disease (HCC)   • Type 2 diabetes mellitus with diabetic peripheral angiopathy without gangrene, without long-term current use of insulin (HCC)   • Actinic keratoses   • Stage 3a chronic kidney disease (HCC)   • Unstable angina (HCC)   • Platelets decreased (HCC)   • Gross hematuria   • Chronic systolic heart failure (HCC)   • Mild aortic stenosis   • Chronic kidney disease-mineral and bone disorder          Patient ID: Nelsy Lino is a [de-identified] y o  male  HPI    The following portions of the patient's history were reviewed and updated as appropriate:     family history includes Alcohol abuse in his father; Diabetes in his mother  reports that he quit smoking about 35 years ago  His smoking use included cigarettes  He has never used smokeless tobacco  He reports current alcohol use of about 14 0 standard drinks per week  He reports that he does not use drugs  Vitals:    02/23/23 1126   Resp: 17       Review of Systems      Objective:  Patient's shoes and socks removed  Foot ExamPhysical Exam       Physical Exam  Constitutional:       General: He is not in acute distress  Appearance: He is well-developed  He is not ill-appearing, toxic-appearing or diaphoretic  HENT:      Head: Normocephalic  Cardiovascular:      Pulses:           Dorsalis pedis pulses are 1+ on the right side and 1+ on the left side  Posterior tibial pulses are 0 on the right side and 0 on the left side        Comments: Palpable DP pulse, nonpalpable PT pulse, CFT is less than 3 seconds, temperature gradient within normal limit, a trophic skin changes noted with skin thinning in shiny, patient report occasional claudication to bilateral calf, localized edema foot and ankle Q9  Pulmonary:      Effort: Pulmonary effort is normal    Musculoskeletal:         General: Tenderness and deformity present  Comments: Pes planus type foot pain on palpation and range of motion of the 1st MPJ, metatarsal heads bilateral foot, pain on palpation on range of motion of the ST joint, crepitus noted in midfoot joint  Skin:     General: Skin is dry  Capillary Refill: Capillary refill takes 2 to 3 seconds  Comments: Trophic skin changes bilateral foot and ankle, alternating hypopigmentation and hyperpigmentation patches around the foot and ankle on anterior leg, skin is shiny and thin, absent hair growth, atrophy of fat pad  This is now at 0 1 cm² Sumner grade 1 ulcer  puncture wound noted plantar right heel around the insertion of the plantar fascia, will localized edema, no drainage, painful upon palpation with hyperkeratotic cap   Neurological:      Mental Status: He is alert and oriented to person, place, and time  Sensory: Sensory deficit present  Motor: Atrophy present        Deep Tendon Reflexes: Reflexes abnormal      Foot Exam     Right Foot/Ankle      Neurovascular  Dorsalis pedis: 1+  Posterior tibial: 0        Left Foot/Ankle       Neurovascular  Dorsalis pedis: 1+  Posterior tibial: 0

## 2023-03-01 ENCOUNTER — APPOINTMENT (OUTPATIENT)
Dept: RADIOLOGY | Facility: CLINIC | Age: 78
End: 2023-03-01

## 2023-03-01 ENCOUNTER — OFFICE VISIT (OUTPATIENT)
Dept: URGENT CARE | Facility: CLINIC | Age: 78
End: 2023-03-01

## 2023-03-01 VITALS
OXYGEN SATURATION: 95 % | DIASTOLIC BLOOD PRESSURE: 68 MMHG | TEMPERATURE: 98.2 F | HEIGHT: 74 IN | BODY MASS INDEX: 32.21 KG/M2 | SYSTOLIC BLOOD PRESSURE: 160 MMHG | RESPIRATION RATE: 18 BRPM | HEART RATE: 44 BPM | WEIGHT: 251 LBS

## 2023-03-01 DIAGNOSIS — R06.02 SOB (SHORTNESS OF BREATH): Primary | ICD-10-CM

## 2023-03-01 DIAGNOSIS — E11.42 TYPE 2 DIABETES MELLITUS WITH DIABETIC POLYNEUROPATHY, WITHOUT LONG-TERM CURRENT USE OF INSULIN (HCC): ICD-10-CM

## 2023-03-01 DIAGNOSIS — R06.02 SOB (SHORTNESS OF BREATH): ICD-10-CM

## 2023-03-01 RX ORDER — GLIMEPIRIDE 2 MG/1
TABLET ORAL
Qty: 180 TABLET | Refills: 3 | Status: SHIPPED | OUTPATIENT
Start: 2023-03-01

## 2023-03-01 RX ORDER — AMOXICILLIN AND CLAVULANATE POTASSIUM 875; 125 MG/1; MG/1
1 TABLET, FILM COATED ORAL EVERY 12 HOURS SCHEDULED
Qty: 14 TABLET | Refills: 0 | Status: SHIPPED | OUTPATIENT
Start: 2023-03-01 | End: 2023-03-07

## 2023-03-01 NOTE — PROGRESS NOTES
[address] [employer] [facility]        NAME: Olinda Yung is a [de-identified] y o  male  : 1945    MRN: [id number]  DATE: 2023  TIME: 6:01 PM    Assessment and Plan   SOB (shortness of breath) [R06 02]  1  SOB (shortness of breath)  XR chest pa & lateral    amoxicillin-clavulanate (AUGMENTIN) 875-125 mg per tablet    ECG 12 lead        Chest x-ray when compared 2022 shows interval improvement; official read pending  However this does not rule out pneumonia in the elderly  As such, we will treat for pneumonia with Augmentin for the next 5 to 7 days  QTc interval safe  Clinical findings are not concerning for CHF exacerbation  However, initial bradycardia followed by a normal rate on ECG raises concern for a sick sinus syndrome  Known history of CAD supports this  Cardiac angiogram of 10/2022 shows 100% occlusion of the LAD (with patent lima bypass) and 100% occlusion of the RCA graft (among other total occlusions)  Angioplasty was not (could not be) performed and aggressive medical therapy was encouraged at the time  I question the value of thrombolytic agents in the case of an acute coronary situation  Patient instructed to contact his cardiologist as soon as possible  Patient Instructions     Follow up with PCP in 3-5 days  Proceed to  ER if symptoms worsen  Chief Complaint     Chief Complaint   Patient presents with   • Cough     X 1 week, with lower back pain , productive cough     • Shortness of Breath   • Fatigue   • Nausea         History of Present Illness     [de-identified] male with pertinent history of CAD s/p CABGx3, CHF and DM2 presents today with 5 days of productive coughing associated with chills, fatigue, postnasal drip, dyspnea and nausea  Is here with his daughter who reports increased work of breathing with exertion with simple activities such as walking to the bathroom  Denies any obvious fevers, abdominal symptoms, chest pain, lower extremity edema, dizziness or headaches  Has experienced occasional angina over the past 6 months with exertion  Review of Systems   Review of Systems   Constitutional: Positive for chills and fatigue  Negative for fever  HENT: Positive for postnasal drip and sore throat  Respiratory: Positive for cough and shortness of breath  Cardiovascular: Negative for chest pain  Gastrointestinal: Positive for nausea  Negative for abdominal pain  Neurological: Negative for dizziness and headaches  Current Medications       Current Outpatient Medications:   •  amoxicillin-clavulanate (AUGMENTIN) 875-125 mg per tablet, Take 1 tablet by mouth every 12 (twelve) hours for 7 days, Disp: 14 tablet, Rfl: 0  •  amLODIPine (NORVASC) 10 mg tablet, TAKE 1 TABLET BY MOUTH  DAILY, Disp: 90 tablet, Rfl: 3  •  aspirin 81 mg chewable tablet, Chew 81 mg daily, Disp: , Rfl:   •  atorvastatin (LIPITOR) 40 mg tablet, Take 1 tablet (40 mg total) by mouth daily, Disp: 90 tablet, Rfl: 3  •  Blood Glucose Monitoring Suppl (OneTouch Verio Reflect) w/Device KIT, Check blood sugars twice daily  Please substitute with appropriate alternative as covered by patient's insurance  Dx: E11 65, Disp: 1 kit, Rfl: 0  •  cadexomer iodine (IODOSORB) 0 9 % gel, Apply 1 application topically daily as needed for wound care, Disp: 10 g, Rfl: 0  •  cloNIDine (Catapres) 0 1 mg tablet, Take 1 tablet (0 1 mg total) by mouth every 12 (twelve) hours, Disp: 180 tablet, Rfl: 1  •  clopidogrel (PLAVIX) 75 mg tablet, Take 75 mg by mouth daily, Disp: , Rfl:   •  fenofibrate 160 MG tablet, Take 1 tablet (160 mg total) by mouth daily, Disp: 90 tablet, Rfl: 3  •  gabapentin (NEURONTIN) 600 MG tablet, TAKE 1 TABLET BY MOUTH  TWICE DAILY, Disp: 180 tablet, Rfl: 3  •  glimepiride (AMARYL) 2 mg tablet, TAKE 1 TABLET BY MOUTH  TWICE DAILY, Disp: 180 tablet, Rfl: 3  •  glucose blood (OneTouch Verio) test strip, Check blood sugars twice daily   Please substitute with appropriate alternative as covered by patient's insurance  Dx: E11 65, Disp: 200 each, Rfl: 3  •  hydrochlorothiazide (HYDRODIURIL) 12 5 mg tablet, Take 1 tablet (12 5 mg total) by mouth daily, Disp: 30 tablet, Rfl: 0  •  Insulin Glargine Solostar (Lantus SoloStar) 100 UNIT/ML SOPN, Inject 0 2 mL (20 Units total) under the skin every evening, Disp: 15 mL, Rfl: 3  •  Insulin Pen Needle 32G X 4 MM MISC, Use every evening, Disp: 100 each, Rfl: 5  •  isosorbide mononitrate (IMDUR) 60 mg 24 hr tablet, Take 1 tablet (60 mg total) by mouth daily, Disp: 90 tablet, Rfl: 2  •  Jardiance 25 MG TABS, TAKE 1 TABLET BY MOUTH IN  THE MORNING, Disp: 90 tablet, Rfl: 3  •  losartan (COZAAR) 100 MG tablet, TAKE 1 TABLET BY MOUTH  DAILY, Disp: 90 tablet, Rfl: 3  •  metFORMIN (GLUCOPHAGE) 500 mg tablet, TAKE 1 TABLET BY MOUTH  TWICE DAILY WITH MEALS, Disp: 180 tablet, Rfl: 3  •  metoprolol succinate (TOPROL-XL) 50 mg 24 hr tablet, TAKE 1 TABLET BY MOUTH  DAILY, Disp: 90 tablet, Rfl: 3  •  Multiple Vitamins-Minerals (MULTIVITAMIN MEN 50+ PO), Take by mouth daily, Disp: , Rfl:   •  Omega-3 Fatty Acids (fish oil) 1,000 mg, Take 4,000 mg by mouth daily , Disp: , Rfl:   •  OneTouch Delica Lancets 51B MISC, Check blood sugars twice daily  Please substitute with appropriate alternative as covered by patient's insurance   Dx: E11 65, Disp: 200 each, Rfl: 3  •  sitaGLIPtin (JANUVIA) 100 mg tablet, Take 100 mg by mouth daily, Disp: , Rfl:     Current Allergies     Allergies as of 03/01/2023 - Reviewed 03/01/2023   Allergen Reaction Noted   • Lisinopril Rash and Lip Swelling 05/02/2021            The following portions of the patient's history were reviewed and updated as appropriate: allergies, current medications, past family history, past medical history, past social history, past surgical history and problem list      Past Medical History:   Diagnosis Date   • CAD (coronary artery disease)    • Cancer ([facility] [address] )     prostate   • CHF (congestive heart failure) ([address] )    • Diabetes mellitus ([place] [address] ) • Myocardial infarction [facility])        Past Surgical History:   Procedure Laterality Date   • ADENOIDECTOMY     • CARDIAC CATHETERIZATION Left 10/19/2022    Procedure: Cardiac Left Heart Cath;  Surgeon: Luis Nguyen MD;  [facility]; Service: Cardiology   • CARDIAC SURGERY  2002    3 cardiac bypass then angioplasty 7/2020   • CHOLECYSTECTOMY     • CORONARY ARTERY BYPASS GRAFT     • PROSTATE SURGERY     • TONSILLECTOMY         Family History   Problem Relation Age of Onset   • Diabetes Mother    • Alcohol abuse Father    • Mental illness Neg Hx          Medications have been verified  Objective   /68   Pulse (!) 44   Temp 98 2 °F (36 8 °C)   Resp 18   Ht 6' 2" (1 88 m)   Wt 114 kg (251 lb)   SpO2 95%   BMI 32 23 kg/m²   No LMP for male patient  Physical Exam     Physical Exam  Vitals and nursing note reviewed  Constitutional:       General: He is in acute distress  Appearance: Normal appearance  He is well-developed and normal weight  He is not ill-appearing, toxic-appearing or diaphoretic  Interventions: He is not intubated  HENT:      Head: Normocephalic and atraumatic  Mouth/Throat:      Mouth: Mucous membranes are moist       Pharynx: No posterior oropharyngeal erythema  Eyes:      General:         Right eye: No discharge  Left eye: No discharge  Conjunctiva/sclera: Conjunctivae normal    Cardiovascular:      Rate and Rhythm: Regular rhythm  Bradycardia present  Pulmonary:      Effort: Pulmonary effort is normal  No tachypnea, accessory muscle usage or respiratory distress  He is not intubated  Breath sounds: Examination of the right-lower field reveals decreased breath sounds  Examination of the left-lower field reveals decreased breath sounds  Decreased breath sounds present  No wheezing, rhonchi or rales  Skin:     General: Skin is warm  Findings: No erythema  Neurological:      General: No focal deficit present  Mental Status: He is alert and oriented to person, place, and time  Psychiatric:         Mood and Affect: Mood normal  Mood is not anxious  Behavior: Behavior normal          Thought Content:  Thought content normal          Judgment: Judgment normal

## 2023-03-02 NOTE — PROGRESS NOTES
Assessment/Plan:     puncture wound probed, hyperkeratotic cap debrided, discussed the patient's signs of infection, patient continue oral antibiotics, culture collected, patient will use Iodosorb for dressing changes, patient to monitor condition, patient return next week for re-evaluation, patient to continue on oral antibiotic, will start doxycycline   Diagnoses and all orders for this visit:    Puncture wound of right foot, initial encounter  -     doxycycline hyclate (VIBRAMYCIN) 100 mg capsule; Take 1 capsule (100 mg total) by mouth every 12 (twelve) hours for 10 days    Diabetic polyneuropathy associated with type 2 diabetes mellitus ([place] [address] )          Subjective:      Patient ID: Colleen Bryan is a [de-identified] y o  male  Follow-up on puncture wound, patient reports compliance with dressing changes and antibiotic, patient reported improvement but no resolution      The following portions of the patient's history were reviewed and updated as appropriate: allergies, current medications, past family history, past medical history, past social history, past surgical history and problem list     Review of Systems   Constitutional: Negative  Respiratory: Negative  Cardiovascular: Negative  Musculoskeletal: Negative  Skin: Positive for color change and wound  Historical Information   Past Medical History:   Diagnosis Date   • CAD (coronary artery disease)    • Cancer ([place] [address] )     prostate   • CHF (congestive heart failure) ([place] [address] )    • Diabetes mellitus ([place] [address] )    • Myocardial infarction [facility])      Past Surgical History:   Procedure Laterality Date   • ADENOIDECTOMY     • CARDIAC CATHETERIZATION Left 10/19/2022    Procedure: Cardiac Left Heart Cath;  Surgeon: Analilia Roth MD;  [facility];   Service: Cardiology   • CARDIAC SURGERY  2002    3 cardiac bypass then angioplasty 7/2020   • CHOLECYSTECTOMY     • CORONARY ARTERY BYPASS GRAFT     • PROSTATE SURGERY     • TONSILLECTOMY Social History   Social History     Substance and Sexual Activity   Alcohol Use Yes   • Alcohol/week: 14 0 standard drinks   • Types: 14 Cans of beer per week    Comment: 1 -2 daily     Social History     Substance and Sexual Activity   Drug Use Never     Social History     Tobacco Use   Smoking Status Former   • Types: Cigarettes   • Quit date:    • Years since quittin 1   Smokeless Tobacco Never     Family History:   Family History   Problem Relation Age of Onset   • Diabetes Mother    • Alcohol abuse Father    • Mental illness Neg Hx        Meds/Allergies   all medications and allergies reviewed  Allergies   Allergen Reactions   • Lisinopril Rash and Lip Swelling       Objective:      /66   Resp 17   Ht 6' 2" (1 88 m)   Wt 115 kg (253 lb)   BMI 32 48 kg/m²          Physical Exam  Constitutional:       General: He is not in acute distress  Appearance: He is well-developed  He is not ill-appearing, toxic-appearing or diaphoretic  HENT:      Head: Normocephalic  Cardiovascular:      Pulses:           Dorsalis pedis pulses are 1+ on the right side and 1+ on the left side  Posterior tibial pulses are 0 on the right side and 0 on the left side  Comments: Palpable DP pulse, nonpalpable PT pulse, CFT is less than 3 seconds, temperature gradient within normal limit, a trophic skin changes noted with skin thinning in shiny, patient report occasional claudication to bilateral calf, localized edema foot and ankle Q9  Pulmonary:      Effort: Pulmonary effort is normal    Musculoskeletal:         General: Tenderness and deformity present  Comments: Pes planus type foot pain on palpation and range of motion of the 1st MPJ, metatarsal heads bilateral foot, pain on palpation on range of motion of the ST joint, crepitus noted in midfoot joint  Skin:     General: Skin is dry  Capillary Refill: Capillary refill takes 2 to 3 seconds        Comments: Trophic skin changes bilateral foot and ankle, alternating hypopigmentation and hyperpigmentation patches around the foot and ankle on anterior leg, skin is shiny and thin, absent hair growth, atrophy of fat pad  Mild improvement noted with less erythema and edema no drainage at this time   Neurological:      Mental Status: He is alert and oriented to person, place, and time  Sensory: Sensory deficit present  Motor: Atrophy present  Deep Tendon Reflexes: Reflexes abnormal       Foot Exam    Right Foot/Ankle     Neurovascular  Dorsalis pedis: 1+  Posterior tibial: 0      Left Foot/Ankle      Neurovascular  Dorsalis pedis: 1+  Posterior tibial: 0              Portions of the record may have been created with voice recognition software  Occasional wrong word or "sound a like" substitutions may have occurred due to the inherent limitations of voice recognition software  Read the chart carefully and recognize, using context, where substitutions have occurred

## 2023-03-03 ENCOUNTER — TELEPHONE (OUTPATIENT)
Dept: NEPHROLOGY | Facility: CLINIC | Age: 78
End: 2023-03-03

## 2023-03-04 LAB
ATRIAL RATE: 100 BPM
ATRIAL RATE: 129 BPM
ATRIAL RATE: 40 BPM
P AXIS: 100 DEGREES
P AXIS: 62 DEGREES
PR INTERVAL: 178 MS
QRS AXIS: -59 DEGREES
QRS AXIS: -59 DEGREES
QRS AXIS: 108 DEGREES
QRSD INTERVAL: 116 MS
QRSD INTERVAL: 116 MS
QRSD INTERVAL: 86 MS
QT INTERVAL: 310 MS
QT INTERVAL: 394 MS
QT INTERVAL: 418 MS
QTC INTERVAL: 449 MS
QTC INTERVAL: 456 MS
QTC INTERVAL: 482 MS
T WAVE AXIS: -15 DEGREES
T WAVE AXIS: 182 DEGREES
T WAVE AXIS: 98 DEGREES
VENTRICULAR RATE: 130 BPM
VENTRICULAR RATE: 78 BPM
VENTRICULAR RATE: 80 BPM

## 2023-03-06 NOTE — PROGRESS NOTES
Cardiology Follow Up    Madhuri Potter  1945  [id number]  [address] [facility] [id number] [phone]    1  Coronary artery disease involving native heart with angina pectoris, unspecified vessel or lesion type ([facility] )  POCT ECG      2  PVC (premature ventricular contraction)  ranolazine (RANEXA) 500 mg 12 hr tablet    nitroglycerin (NITROSTAT) 0 4 mg SL tablet    Ambulatory  Referral to Cardiac Rehabilitation      3  Hypertension, unspecified type  nitroglycerin (NITROSTAT) 0 4 mg SL tablet    Ambulatory  Referral to Cardiac Rehabilitation      4  Mixed hyperlipidemia  Ambulatory  Referral to Cardiac Rehabilitation      5  Stage 3b chronic kidney disease ([place] [address] )        6  Type 2 diabetes mellitus with diabetic polyneuropathy, without long-term current use of insulin (HCC)            Interval History:   Mr Madhuri Potter presents to our office for a follow up office visit  He is accompanied by his daughter  Sung Khan was seen at urgent Care on 3/01/23 for SOB  He was felt to have possible pneumonia and treated with Augmentin for 507 days     HR 44 BPM   EKG showed NSR 80 BPM with frequent PVC's, second EKG 78 BPM frequent PCV'c  Sung Khan was discharged and instructed to follow up with Cardiology  According to Thomas HR of 44 BPM obtained from pulse oximetry  I have found a third EKG done on the following day with his name on it which states AF with RVR  Sung Khan was not seen by urgent care on this day therefor this is not his EKG  I have asked our administrators to have this EKG removed from his chart  Sung Khan admits to decreased activity tolerance, shortness of breath with minimal exertion such as walking into our office and chest pressure with walking too much  He denies Palpitations, lightheadedness or dizziness          Medical History   Primary Cardiologist Dr Kirsten Cheng requesting to change to Dr Shalom Ochoa   CAD  CABG x 3 in 2002 at [facility]   Hypertension  Hyperlipidemia 7/19/22 , , HDL 46, LDL 62   Hx of prostate CA  CKD IIIb 1 36 - 1 48 GFR 44-49   DM2 HgbA1C 7 1 on 10/10/22    10/19/22 LHC : Proximal LAD to mid LAD 90% stenosis, LIMA to LAD widely proximal RCA lesion 100% stenosis  RCA graft 100% occluded circumflex with distal circumflex lesion 40% stenosis, ostial circumflex to proximal circumflex 40% stenosis  First marginal lesion 99%  Bypass vessel bypass 2300% occluded could not visualize  Proximal graft lesion 100% stenosis RCA graft 100% occluded distal circumflex stent widely patent  Grade 1 collaterals from the left circulation to distal right coronary artery  Patent severe three-vessel CAD follicular 122%  Mid LAD 85 obtuse marginal patent stent in the distal circumflex  Nonobstructive disease noted in the left main circumflex coronary artery  Vein graft RCA occluded LAD widely patent    7/26/22 TTE: Ventricle cavity size normal systolic  normal LVEF 50 to 55% grade 1 abnormal relaxation  Sigmoid appearance of the septum  Right systolic function appears normal left atrium mildly dilated  Right atrium normal size  Aortic valve trileaflet mild stenosis JEFF 1 8 cm², Peak gradient 25/27 mild MR, mild TR        Patient Active Problem List   Diagnosis   • Mixed hyperlipidemia   • Benign hypertension with CKD (chronic kidney disease) stage III ([facility])   • Coronary artery disease involving native coronary artery of native heart without angina pectoris   • S/P angioplasty with stent   • S/P CABG x 3   • S/P AAA repair   • S/P prostatectomy   • H/O prostate cancer   • Type 2 diabetes mellitus with diabetic polyneuropathy, without long-term current use of insulin ([facility])   • Varicose veins of left lower extremity   • History of endovascular stent graft for abdominal aortic aneurysm (AAA)   • Bruit (arterial)   • Peripheral artery disease ([facility])   • Type 2 diabetes mellitus with diabetic peripheral angiopathy without gangrene, without long-term current use of insulin (HCC)   • Actinic keratoses   • Stage 3a chronic kidney disease (HCC)   • Unstable angina (HCC)   • Platelets decreased (HCC)   • Gross hematuria   • Chronic systolic heart failure (HCC)   • Mild aortic stenosis   • Chronic kidney disease-mineral and bone disorder     Past Medical History:   Diagnosis Date   • CAD (coronary artery disease)    • Cancer ([facility])     prostate   • CHF (congestive heart failure) ([facility])    • Diabetes mellitus ([facility] )    • Myocardial infarction ([facility] )      Social History     Socioeconomic History   • Marital status: /Civil Union     Spouse name: Not on file   • Number of children: Not on file   • Years of education: Not on file   • Highest education level: Not on file   Occupational History   • Not on file   Tobacco Use   • Smoking status: Former     Types: Cigarettes     Quit date: 80     Years since quittin 2   • Smokeless tobacco: Never   Vaping Use   • Vaping Use: Never used   Substance and Sexual Activity   • Alcohol use:  Yes     Alcohol/week: 14 0 standard drinks     Types: 14 Cans of beer per week     Comment: 1 -2 daily   • Drug use: Never   • Sexual activity: Not on file   Other Topics Concern   • Not on file   Social History Narrative   • Not on file     Social Determinants of Health     Financial Resource Strain: Not on file   Food Insecurity: Not on file   Transportation Needs: Not on file   Physical Activity: Not on file   Stress: Not on file   Social Connections: Not on file   Intimate Partner Violence: Not on file   Housing Stability: Not on file      Family History   Problem Relation Age of Onset   • Diabetes Mother    • Alcohol abuse Father    • Mental illness Neg Hx      Past Surgical History:   Procedure Laterality Date   • ADENOIDECTOMY     • CARDIAC CATHETERIZATION Left 10/19/2022    Procedure: Cardiac Left Heart Cath;  Surgeon: Nazario Marcus MD;  [facility]: [facility]; Service: Cardiology   • CARDIAC SURGERY  2002    3 cardiac bypass then angioplasty 7/2020   • CHOLECYSTECTOMY     • CORONARY ARTERY BYPASS GRAFT     • PROSTATE SURGERY     • TONSILLECTOMY         Current Outpatient Medications:   •  amLODIPine (NORVASC) 10 mg tablet, TAKE 1 TABLET BY MOUTH  DAILY, Disp: 90 tablet, Rfl: 3  •  amoxicillin-clavulanate (AUGMENTIN) 875-125 mg per tablet, Take 1 tablet by mouth every 12 (twelve) hours for 7 days, Disp: 14 tablet, Rfl: 0  •  aspirin 81 mg chewable tablet, Chew 81 mg daily, Disp: , Rfl:   •  atorvastatin (LIPITOR) 40 mg tablet, Take 1 tablet (40 mg total) by mouth daily, Disp: 90 tablet, Rfl: 3  •  Blood Glucose Monitoring Suppl (OneTouch Verio Reflect) w/Device KIT, Check blood sugars twice daily  Please substitute with appropriate alternative as covered by patient's insurance  Dx: E11 65, Disp: 1 kit, Rfl: 0  •  cadexomer iodine (IODOSORB) 0 9 % gel, Apply 1 application topically daily as needed for wound care, Disp: 10 g, Rfl: 0  •  cloNIDine (Catapres) 0 1 mg tablet, Take 1 tablet (0 1 mg total) by mouth every 12 (twelve) hours, Disp: 180 tablet, Rfl: 1  •  clopidogrel (PLAVIX) 75 mg tablet, Take 75 mg by mouth daily, Disp: , Rfl:   •  fenofibrate 160 MG tablet, Take 1 tablet (160 mg total) by mouth daily, Disp: 90 tablet, Rfl: 3  •  gabapentin (NEURONTIN) 600 MG tablet, TAKE 1 TABLET BY MOUTH  TWICE DAILY, Disp: 180 tablet, Rfl: 3  •  glimepiride (AMARYL) 2 mg tablet, TAKE 1 TABLET BY MOUTH  TWICE DAILY, Disp: 180 tablet, Rfl: 3  •  glucose blood (OneTouch Verio) test strip, Check blood sugars twice daily  Please substitute with appropriate alternative as covered by patient's insurance   Dx: E11 65, Disp: 200 each, Rfl: 3  •  hydrochlorothiazide (HYDRODIURIL) 12 5 mg tablet, Take 1 tablet (12 5 mg total) by mouth daily, Disp: 30 tablet, Rfl: 0  •  Insulin Glargine Solostar (Lantus SoloStar) 100 UNIT/ML SOPN, Inject 0 2 mL (20 Units total) under the skin every evening, Disp: 15 mL, Rfl: 3  •  Insulin Pen Needle 32G X 4 MM MISC, Use every evening, Disp: 100 each, Rfl: 5  •  isosorbide mononitrate (IMDUR) 60 mg 24 hr tablet, Take 1 tablet (60 mg total) by mouth daily, Disp: 90 tablet, Rfl: 2  •  Jardiance 25 MG TABS, TAKE 1 TABLET BY MOUTH IN  THE MORNING, Disp: 90 tablet, Rfl: 3  •  losartan (COZAAR) 100 MG tablet, TAKE 1 TABLET BY MOUTH  DAILY, Disp: 90 tablet, Rfl: 3  •  metFORMIN (GLUCOPHAGE) 500 mg tablet, TAKE 1 TABLET BY MOUTH  TWICE DAILY WITH MEALS, Disp: 180 tablet, Rfl: 3  •  metoprolol succinate (TOPROL-XL) 50 mg 24 hr tablet, TAKE 1 TABLET BY MOUTH  DAILY, Disp: 90 tablet, Rfl: 3  •  Multiple Vitamins-Minerals (MULTIVITAMIN MEN 50+ PO), Take by mouth daily, Disp: , Rfl:   •  Omega-3 Fatty Acids (fish oil) 1,000 mg, Take 4,000 mg by mouth daily , Disp: , Rfl:   •  OneTouch Delica Lancets 81K MISC, Check blood sugars twice daily  Please substitute with appropriate alternative as covered by patient's insurance   Dx: E11 65, Disp: 200 each, Rfl: 3  •  sitaGLIPtin (JANUVIA) 100 mg tablet, Take 100 mg by mouth daily, Disp: , Rfl:   Allergies   Allergen Reactions   • Lisinopril Rash and Lip Swelling       Labs:  Office Visit on 03/01/2023   Component Date Value   • Ventricular Rate 03/01/2023 80    • Atrial Rate 03/01/2023 100    • ND Interval 03/01/2023 178    • QRSD Interval 03/01/2023 116    • QT Interval 03/01/2023 418    • QTC Interval 03/01/2023 482    • P Axis 03/01/2023 62    • QRS Axis 03/01/2023 -59    • T Wave Axis 03/01/2023 -15    • Ventricular Rate 03/01/2023 78    • Atrial Rate 03/01/2023 40    • QRSD Interval 03/01/2023 116    • QT Interval 03/01/2023 394    • QTC Interval 03/01/2023 449    • P Axis 03/01/2023 100    • QRS Axis 03/01/2023 -59    • T Wave Axis 03/01/2023 98    • Ventricular Rate 03/02/2023 130    • Atrial Rate 03/02/2023 129    • QRSD Interval 03/02/2023 86    • QT Interval 03/02/2023 310    • QTC Interval 03/02/2023 456    • QRS Axis 03/02/2023 108    • T Wave Axis 03/02/2023 182    Lab Requisition on 01/18/2023   Component Date Value   • Wound Culture 01/18/2023 1+ Growth of Staphylococcus epidermidis (A)    • Wound Culture 01/18/2023 Few Colonies of    • Gram Stain Result 01/18/2023 No Polys or Bacteria seen    Admission on 01/12/2023, Discharged on 01/12/2023   Component Date Value   • WBC 01/12/2023 6 92    • RBC 01/12/2023 4 78    • Hemoglobin 01/12/2023 13 3    • Hematocrit 01/12/2023 41 1    • MCV 01/12/2023 86    • MCH 01/12/2023 27 8    • MCHC 01/12/2023 32 4    • RDW 01/12/2023 13 6    • MPV 01/12/2023 10 4    • Platelets 03/25/4105 182    • nRBC 01/12/2023 0    • Neutrophils Relative 01/12/2023 63    • Immat GRANS % 01/12/2023 0    • Lymphocytes Relative 01/12/2023 26    • Monocytes Relative 01/12/2023 8    • Eosinophils Relative 01/12/2023 3    • Basophils Relative 01/12/2023 0    • Neutrophils Absolute 01/12/2023 4 30    • Immature Grans Absolute 01/12/2023 0 02    • Lymphocytes Absolute 01/12/2023 1 79    • Monocytes Absolute 01/12/2023 0 56    • Eosinophils Absolute 01/12/2023 0 22    • Basophils Absolute 01/12/2023 0 03    • Sodium 01/12/2023 145    • Potassium 01/12/2023 4 1    • Chloride 01/12/2023 107    • CO2 01/12/2023 30    • ANION GAP 01/12/2023 8    • BUN 01/12/2023 38 (H)    • Creatinine 01/12/2023 1 48 (H)    • Glucose 01/12/2023 114    • Calcium 01/12/2023 9 2    • AST 01/12/2023 19    • ALT 01/12/2023 30    • Alkaline Phosphatase 01/12/2023 46    • Total Protein 01/12/2023 7 5    • Albumin 01/12/2023 3 6    • Total Bilirubin 01/12/2023 0 34    • eGFR 01/12/2023 44    • CRP 01/12/2023 1 9      Imaging: XR chest pa & lateral    Result Date: 3/2/2023  Narrative: CHEST INDICATION:   R06 02: Shortness of breath  COMPARISON:  2/16/2022 EXAM PERFORMED/VIEWS:  XR CHEST PA & LATERAL FINDINGS: Cardiomediastinal silhouette appears unremarkable  The lungs are clear  Right hemidiaphragm chronic elevation and CABG    No pneumothorax or pleural effusion  Osseous structures appear within normal limits for patient age  Impression: No acute cardiopulmonary disease  Workstation performed: [id number]       Review of Systems:  Review of Systems   Constitutional: Positive for fatigue  Respiratory: Positive for shortness of breath  Cardiovascular: Positive for chest pain  Musculoskeletal: Positive for arthralgias and myalgias  All other systems reviewed and are negative  Physical Exam:  Physical Exam  Vitals reviewed  Constitutional:       Appearance: Normal appearance  Cardiovascular:      Rate and Rhythm: Normal rate and regular rhythm  Pulses: Normal pulses  Heart sounds: Normal heart sounds  Pulmonary:      Effort: Pulmonary effort is normal       Breath sounds: Normal breath sounds  Abdominal:      General: Bowel sounds are normal       Palpations: Abdomen is soft  Musculoskeletal:         General: Normal range of motion  Cervical back: Normal range of motion and neck supple  Right lower leg: No edema  Left lower leg: No edema  Skin:     General: Skin is warm  Capillary Refill: Capillary refill takes less than 2 seconds  Neurological:      General: No focal deficit present  Mental Status: He is alert and oriented to person, place, and time  Psychiatric:         Mood and Affect: Mood normal          Behavior: Behavior normal          Discussion/Summary:  1  CAD, complaints of shortness of breath and CP with exertion  Hx of CABG x 3 in 2002 at [address] [place] [address] [facility]   10/19/22 LHC : Proximal LAD to mid LAD 90% stenosis, LIMA to LAD widely proximal RCA lesion 100% stenosis  RCA graft 100% occluded circumflex with distal circumflex lesion 40% stenosis, ostial circumflex to proximal circumflex 40% stenosis  First marginal lesion 99%  Bypass vessel bypass 2300% occluded could not visualize    Proximal graft lesion 100% stenosis RCA graft 100% occluded distal circumflex stent widely patent  Grade 1 collaterals from the left circulation to distal right coronary artery  Patent severe three-vessel CAD follicular 522%  Mid LAD 85 obtuse marginal patent stent in the distal circumflex  Nonobstructive disease noted in the left main circumflex coronary artery  Vein graft RCA occluded LAD widely patent, treated with medical management  Admits to fatigue, activity intolerance, Shortness of breath and slight chest pain with walking too much  Most likely due to CAD  Medical management   Continue on aspirin 81 mg daily, Imdur 60milligrams daily, Plavix 75 mg daily, Lipitor 40 mg daily, Metoprol succinate 50 mg daily, Losartan 100mg daily, HCTZ 12 5mg daily Omega 3 4000 mg, start Ranexa 500 mg BID and cardiac rehabilitation  DASH diet   2  PVC's EKG shows NSR 62 BPM, no PVC's, denies palpitations, most likely due infectious process treated with Augmentin   3  Hypertension RUE sitting 110/52, continue on metoprolol succinate 50mg daily, Imdur 60mg daily, Losartan 100mg daily, HCTA 12 5mg daily    4  Hyperlipidemia 7/19/22 , , HDL 46, LDL 62   5  CKD IIIb 1 36 - 1 48 GFR 44-49 follows with Nephrology   6   DM2 HgbA1C 7 1 on 10/10/22 continue on Jardiance 25mg daily Metfromin 500mg BID, Januvia 100mg daily, Lantus 20 units Q evening follow up with PCP

## 2023-03-07 ENCOUNTER — OFFICE VISIT (OUTPATIENT)
Dept: CARDIOLOGY CLINIC | Facility: CLINIC | Age: 78
End: 2023-03-07

## 2023-03-07 VITALS
DIASTOLIC BLOOD PRESSURE: 58 MMHG | BODY MASS INDEX: 32.15 KG/M2 | SYSTOLIC BLOOD PRESSURE: 108 MMHG | OXYGEN SATURATION: 97 % | HEART RATE: 62 BPM | WEIGHT: 250.5 LBS | HEIGHT: 74 IN

## 2023-03-07 DIAGNOSIS — E11.42 TYPE 2 DIABETES MELLITUS WITH DIABETIC POLYNEUROPATHY, WITHOUT LONG-TERM CURRENT USE OF INSULIN (HCC): ICD-10-CM

## 2023-03-07 DIAGNOSIS — I10 HYPERTENSION, UNSPECIFIED TYPE: ICD-10-CM

## 2023-03-07 DIAGNOSIS — I49.3 PVC (PREMATURE VENTRICULAR CONTRACTION): ICD-10-CM

## 2023-03-07 DIAGNOSIS — E78.2 MIXED HYPERLIPIDEMIA: ICD-10-CM

## 2023-03-07 DIAGNOSIS — N18.32 STAGE 3B CHRONIC KIDNEY DISEASE (HCC): ICD-10-CM

## 2023-03-07 DIAGNOSIS — I25.119 CORONARY ARTERY DISEASE INVOLVING NATIVE HEART WITH ANGINA PECTORIS, UNSPECIFIED VESSEL OR LESION TYPE (HCC): Primary | ICD-10-CM

## 2023-03-07 PROBLEM — I20.8 STABLE ANGINA PECTORIS (HCC): Status: ACTIVE | Noted: 2023-03-07

## 2023-03-07 PROBLEM — I20.89 STABLE ANGINA PECTORIS: Status: ACTIVE | Noted: 2023-03-07

## 2023-03-07 RX ORDER — NITROGLYCERIN 0.4 MG/1
0.4 TABLET SUBLINGUAL
Qty: 30 TABLET | Refills: 3 | Status: SHIPPED | OUTPATIENT
Start: 2023-03-07

## 2023-03-07 RX ORDER — RANOLAZINE 500 MG/1
500 TABLET, EXTENDED RELEASE ORAL 2 TIMES DAILY
Qty: 60 TABLET | Refills: 3 | Status: SHIPPED | OUTPATIENT
Start: 2023-03-07

## 2023-03-09 ENCOUNTER — APPOINTMENT (OUTPATIENT)
Dept: LAB | Facility: CLINIC | Age: 78
End: 2023-03-09

## 2023-03-09 DIAGNOSIS — I25.10 CORONARY ARTERY DISEASE INVOLVING NATIVE CORONARY ARTERY OF NATIVE HEART WITHOUT ANGINA PECTORIS: ICD-10-CM

## 2023-03-09 DIAGNOSIS — E78.2 MIXED HYPERLIPIDEMIA: ICD-10-CM

## 2023-03-09 DIAGNOSIS — E11.42 TYPE 2 DIABETES MELLITUS WITH DIABETIC POLYNEUROPATHY, WITHOUT LONG-TERM CURRENT USE OF INSULIN (HCC): ICD-10-CM

## 2023-03-09 DIAGNOSIS — I10 ESSENTIAL HYPERTENSION: ICD-10-CM

## 2023-03-09 DIAGNOSIS — N18.31 STAGE 3A CHRONIC KIDNEY DISEASE (HCC): ICD-10-CM

## 2023-03-09 DIAGNOSIS — E11.51 TYPE 2 DIABETES MELLITUS WITH DIABETIC PERIPHERAL ANGIOPATHY WITHOUT GANGRENE, WITHOUT LONG-TERM CURRENT USE OF INSULIN (HCC): ICD-10-CM

## 2023-03-09 LAB
ALBUMIN SERPL BCP-MCNC: 3.3 G/DL (ref 3.5–5)
ALP SERPL-CCNC: 42 U/L (ref 46–116)
ALT SERPL W P-5'-P-CCNC: 30 U/L (ref 12–78)
ANION GAP SERPL CALCULATED.3IONS-SCNC: 7 MMOL/L (ref 4–13)
AST SERPL W P-5'-P-CCNC: 23 U/L (ref 5–45)
BILIRUB SERPL-MCNC: 0.43 MG/DL (ref 0.2–1)
BUN SERPL-MCNC: 30 MG/DL (ref 5–25)
CALCIUM ALBUM COR SERPL-MCNC: 10.5 MG/DL (ref 8.3–10.1)
CALCIUM SERPL-MCNC: 9.9 MG/DL (ref 8.3–10.1)
CHLORIDE SERPL-SCNC: 105 MMOL/L (ref 96–108)
CHOLEST SERPL-MCNC: 114 MG/DL
CO2 SERPL-SCNC: 27 MMOL/L (ref 21–32)
CREAT SERPL-MCNC: 1.46 MG/DL (ref 0.6–1.3)
CREAT UR-MCNC: 61.6 MG/DL
ERYTHROCYTE [DISTWIDTH] IN BLOOD BY AUTOMATED COUNT: 12.8 % (ref 11.6–15.1)
GFR SERPL CREATININE-BSD FRML MDRD: 44 ML/MIN/1.73SQ M
GLUCOSE P FAST SERPL-MCNC: 98 MG/DL (ref 65–99)
HCT VFR BLD AUTO: 42.8 % (ref 36.5–49.3)
HDLC SERPL-MCNC: 39 MG/DL
HGB BLD-MCNC: 13.5 G/DL (ref 12–17)
LDLC SERPL CALC-MCNC: 56 MG/DL (ref 0–100)
MCH RBC QN AUTO: 27.2 PG (ref 26.8–34.3)
MCHC RBC AUTO-ENTMCNC: 31.5 G/DL (ref 31.4–37.4)
MCV RBC AUTO: 86 FL (ref 82–98)
MICROALBUMIN UR-MCNC: 18.3 MG/L (ref 0–20)
MICROALBUMIN/CREAT 24H UR: 30 MG/G CREATININE (ref 0–30)
PLATELET # BLD AUTO: 378 THOUSANDS/UL (ref 149–390)
PMV BLD AUTO: 10.5 FL (ref 8.9–12.7)
POTASSIUM SERPL-SCNC: 4.7 MMOL/L (ref 3.5–5.3)
PROT SERPL-MCNC: 8.1 G/DL (ref 6.4–8.4)
RBC # BLD AUTO: 4.96 MILLION/UL (ref 3.88–5.62)
SODIUM SERPL-SCNC: 139 MMOL/L (ref 135–147)
TRIGL SERPL-MCNC: 93 MG/DL
WBC # BLD AUTO: 9.71 THOUSAND/UL (ref 4.31–10.16)

## 2023-03-10 LAB
EST. AVERAGE GLUCOSE BLD GHB EST-MCNC: 171 MG/DL
HBA1C MFR BLD: 7.6 %

## 2023-03-13 ENCOUNTER — OFFICE VISIT (OUTPATIENT)
Dept: PODIATRY | Facility: CLINIC | Age: 78
End: 2023-03-13

## 2023-03-13 ENCOUNTER — OFFICE VISIT (OUTPATIENT)
Dept: FAMILY MEDICINE CLINIC | Facility: CLINIC | Age: 78
End: 2023-03-13

## 2023-03-13 VITALS
WEIGHT: 250 LBS | BODY MASS INDEX: 32.08 KG/M2 | HEIGHT: 74 IN | RESPIRATION RATE: 17 BRPM | DIASTOLIC BLOOD PRESSURE: 80 MMHG | SYSTOLIC BLOOD PRESSURE: 134 MMHG

## 2023-03-13 VITALS
HEART RATE: 68 BPM | WEIGHT: 250 LBS | HEIGHT: 74 IN | TEMPERATURE: 97 F | BODY MASS INDEX: 32.08 KG/M2 | SYSTOLIC BLOOD PRESSURE: 134 MMHG | RESPIRATION RATE: 16 BRPM | DIASTOLIC BLOOD PRESSURE: 80 MMHG

## 2023-03-13 DIAGNOSIS — E11.42 TYPE 2 DIABETES MELLITUS WITH DIABETIC POLYNEUROPATHY, WITHOUT LONG-TERM CURRENT USE OF INSULIN (HCC): Primary | ICD-10-CM

## 2023-03-13 DIAGNOSIS — I70.209 PERIPHERAL ARTERIOSCLEROSIS (HCC): ICD-10-CM

## 2023-03-13 DIAGNOSIS — I50.22 CHRONIC SYSTOLIC HEART FAILURE (HCC): ICD-10-CM

## 2023-03-13 DIAGNOSIS — E78.2 MIXED HYPERLIPIDEMIA: ICD-10-CM

## 2023-03-13 DIAGNOSIS — I20.8 STABLE ANGINA PECTORIS (HCC): ICD-10-CM

## 2023-03-13 DIAGNOSIS — E66.9 OBESITY (BMI 30.0-34.9): ICD-10-CM

## 2023-03-13 DIAGNOSIS — N18.30 BENIGN HYPERTENSION WITH CKD (CHRONIC KIDNEY DISEASE) STAGE III (HCC): ICD-10-CM

## 2023-03-13 DIAGNOSIS — I12.9 BENIGN HYPERTENSION WITH CKD (CHRONIC KIDNEY DISEASE) STAGE III (HCC): ICD-10-CM

## 2023-03-13 DIAGNOSIS — L97.411 DIABETIC ULCER OF RIGHT HEEL ASSOCIATED WITH TYPE 2 DIABETES MELLITUS, LIMITED TO BREAKDOWN OF SKIN (HCC): Primary | ICD-10-CM

## 2023-03-13 DIAGNOSIS — I73.9 PERIPHERAL ARTERY DISEASE (HCC): ICD-10-CM

## 2023-03-13 DIAGNOSIS — I25.10 CORONARY ARTERY DISEASE INVOLVING NATIVE CORONARY ARTERY OF NATIVE HEART WITHOUT ANGINA PECTORIS: ICD-10-CM

## 2023-03-13 DIAGNOSIS — E11.42 DIABETIC POLYNEUROPATHY ASSOCIATED WITH TYPE 2 DIABETES MELLITUS (HCC): ICD-10-CM

## 2023-03-13 DIAGNOSIS — N18.32 STAGE 3B CHRONIC KIDNEY DISEASE (HCC): ICD-10-CM

## 2023-03-13 DIAGNOSIS — E11.621 DIABETIC ULCER OF RIGHT HEEL ASSOCIATED WITH TYPE 2 DIABETES MELLITUS, LIMITED TO BREAKDOWN OF SKIN (HCC): Primary | ICD-10-CM

## 2023-03-13 DIAGNOSIS — H61.22 IMPACTED CERUMEN OF LEFT EAR: ICD-10-CM

## 2023-03-13 DIAGNOSIS — D69.6 PLATELETS DECREASED (HCC): ICD-10-CM

## 2023-03-13 DIAGNOSIS — E11.51 TYPE 2 DIABETES MELLITUS WITH DIABETIC PERIPHERAL ANGIOPATHY WITHOUT GANGRENE, WITHOUT LONG-TERM CURRENT USE OF INSULIN (HCC): ICD-10-CM

## 2023-03-13 PROBLEM — I20.0 UNSTABLE ANGINA (HCC): Status: RESOLVED | Noted: 2022-07-22 | Resolved: 2023-03-13

## 2023-03-13 RX ORDER — INSULIN GLARGINE 100 [IU]/ML
22 INJECTION, SOLUTION SUBCUTANEOUS EVERY EVENING
Qty: 15 ML | Refills: 3 | Status: SHIPPED | OUTPATIENT
Start: 2023-03-13 | End: 2023-03-17 | Stop reason: SDUPTHER

## 2023-03-13 NOTE — PATIENT INSTRUCTIONS
Obesity   AMBULATORY CARE:   Obesity  means your body mass index (BMI) is greater than 30  Your healthcare provider will use your age, height, and weight to measure your BMI  The risks of obesity include  many health problems, including injuries or physical disability  • Diabetes (high blood sugar level)    • High blood pressure or high cholesterol    • Heart disease    • Stroke    • Gallbladder or liver disease    • Cancer of the colon, breast, prostate, liver, or kidney    • Sleep apnea    • Arthritis or gout    Screening  is done to check for health conditions before you have signs or symptoms  If you are 28to 79years old, your blood sugar level may be checked every 3 years for signs of prediabetes or diabetes  Your healthcare provider will check your blood pressure at each visit  High blood pressure can lead to a stroke or other problems  Your provider may check for signs of heart disease, cancer, or other health problems  Seek care immediately if:   • You have a severe headache, confusion, or difficulty speaking  • You have weakness on one side of your body  • You have chest pain, sweating, or shortness of breath  Call your doctor if:   • You have symptoms of gallbladder or liver disease, such as pain in your upper abdomen  • You have knee or hip pain and discomfort while walking  • You have symptoms of diabetes, such as intense hunger and thirst, and frequent urination  • You have symptoms of sleep apnea, such as snoring or daytime sleepiness  • You have questions or concerns about your condition or care  Treatment for obesity  focuses on helping you lose weight to improve your health  Even a small decrease in BMI can reduce the risk for many health problems  Your healthcare provider will help you set a weight-loss goal   • Lifestyle changes  are the first step in treating obesity  These include making healthy food choices and getting regular physical activity   Your healthcare provider may suggest a weight-loss program that involves coaching, education, and therapy  • Medicine  may help you lose weight when it is used with a healthy foods and physical activity  • Surgery  can help you lose weight if you have obesity along with other health problems  Several types of weight-loss surgery are available  Ask your healthcare provider for more information  Tips for safe weight loss:   • Set small, realistic goals  An example of a small goal is to walk for 20 minutes 5 days a week  Anther goal is to lose 5% of your body weight  • Ask for support  Tell friends, family members, and coworkers about your goals  Ask someone to lose weight with you  You may also want to join a weight-loss support group  • Identify foods or triggers that may cause you to overeat  Remove tempting high-calorie foods from your home and workplace  Place a bowl of fresh fruit on your kitchen counter  If stress causes you to eat, find other ways to cope with stress  A counselor or therapist may be able to help you  • Track your daily calories and activity  Write down what you eat and drink  Also write down how many minutes of physical activity you do each day  • Track your weekly weight  Weigh yourself in the morning, before you eat or drink anything but after you use the bathroom  Use the same scale, in the same place, and in similar clothing each time  Only weigh yourself 1 to 2 times each week, or as directed  You may become discouraged if you weigh yourself every day  Eating changes: You will need to eat 500 to 1,000 fewer calories each day than you currently eat to lose 1 to 2 pounds a week  The following changes will help you cut calories:  • Eat smaller portions  Use small plates, no larger than 9 inches in diameter  Fill your plate half full of fruits and vegetables  Measure your food using measuring cups until you know what a serving size looks like           • Eat 3 meals and 1 or 2 snacks each day  Plan your meals in advance  [place] and eat at home most of the time  Eat slowly  Do not skip meals  Skipping meals can lead to overeating later in the day  This can make it harder for you to lose weight  Talk with a dietitian to help you make a meal plan and schedule that is right for you  • Eat fruits and vegetables at every meal   They are low in calories and high in fiber, which makes you feel full  Do not add butter, margarine, or cream sauce to vegetables  Use herbs to season steamed vegetables  • Eat less fat and fewer fried foods  Eat more baked or grilled chicken and fish  These protein sources are lower in calories and fat than red meat  Limit fast food  Dress your salads with olive oil and vinegar instead of bottled dressing  • Limit the amount of sugar you eat  Do not drink sugary beverages  Limit alcohol  Activity changes:  Physical activity is good for your body in many ways  It helps you burn calories and build strong muscles  It decreases stress and depression, and improves your mood  It can also help you sleep better  Talk to your healthcare provider before you begin an exercise program   • Exercise for at least 30 minutes 5 days a week  Start slowly  Set aside time each day for physical activity that you enjoy and that is convenient for you  It is best to do both weight training and an activity that increases your heart rate, such as walking, bicycling, or swimming  • Find ways to be more active  Do yard work and housecleaning  Walk up the stairs instead of using elevators  Spend your leisure time going to events that require walking, such as outdoor festivals or fairs  This extra physical activity can help you lose weight and keep it off  Follow up with your doctor as directed: You may need to meet with a dietitian  Write down your questions so you remember to ask them during your visits    © Copyright [employer] 2022 Information is for End User's use only and may not be sold, redistributed or otherwise used for commercial purposes  The above information is an  only  It is not intended as medical advice for individual conditions or treatments  Talk to your doctor, nurse or pharmacist before following any medical regimen to see if it is safe and effective for you

## 2023-03-13 NOTE — PROGRESS NOTES
Assessment/Plan:     Diabetic foot ulcer  History of puncture wound right heel  Diabetic neuropathy  Mild peripheral artery disease         Plan  Foot exam performed  Chart reviewed  Patient advised on condition  Today wound debrided  Utilizing 10 blade excisional debridement done  All devitalized tissue removed from wound  No evidence of cellulitis  Gentian violet applied  Maxorb and dry sterile dressing applied  Patient advised on aftercare  After 3 days he can return to [facility] and bandage  Watch for signs of infection return for follow-up            Diagnoses and all orders for this visit:     Diabetic ulcer of right heel associated with type 2 diabetes mellitus, limited to breakdown of skin ([facility], [place] [address] )     Diabetic polyneuropathy associated with type 2 diabetes mellitus ([facility], [place] [address] )     Peripheral arteriosclerosis ([facility], [place] [address] )            Subjective: Patient has pain in his right heel  He has history of stepping on nail  Patient is diabetic  No history of fever or night sweats          Allergies   Allergen Reactions   • Lisinopril Rash and Lip Swelling            Current Outpatient Medications:   •  amLODIPine (NORVASC) 10 mg tablet, TAKE 1 TABLET BY MOUTH  DAILY, Disp: 90 tablet, Rfl: 3  •  aspirin 81 mg chewable tablet, Chew 81 mg daily, Disp: , Rfl:   •  atorvastatin (LIPITOR) 40 mg tablet, Take 1 tablet (40 mg total) by mouth daily, Disp: 90 tablet, Rfl: 3  •  Blood Glucose Monitoring Suppl (OneTouch Verio Reflect) w/Device KIT, Check blood sugars twice daily  Please substitute with appropriate alternative as covered by patient's insurance   Dx: E11 65, Disp: 1 kit, Rfl: 0  •  cadexomer iodine (IODOSORB) 0 9 % gel, Apply 1 application topically daily as needed for wound care, Disp: 10 g, Rfl: 0  •  cloNIDine (Catapres) 0 1 mg tablet, Take 1 tablet (0 1 mg total) by mouth every 12 (twelve) hours, Disp: 180 tablet, Rfl: 1  •  clopidogrel (PLAVIX) 75 mg tablet, Take 75 mg by mouth daily, Disp: , Rfl:   • fenofibrate 160 MG tablet, Take 1 tablet (160 mg total) by mouth daily, Disp: 90 tablet, Rfl: 3  •  gabapentin (NEURONTIN) 600 MG tablet, TAKE 1 TABLET BY MOUTH  TWICE DAILY, Disp: 180 tablet, Rfl: 3  •  glimepiride (AMARYL) 2 mg tablet, Take 1 tablet (2 mg total) by mouth 2 (two) times a day, Disp: 180 tablet, Rfl: 1  •  glucose blood (OneTouch Verio) test strip, Check blood sugars twice daily  Please substitute with appropriate alternative as covered by patient's insurance  Dx: E11 65, Disp: 200 each, Rfl: 3  •  hydrochlorothiazide (HYDRODIURIL) 12 5 mg tablet, Take 1 tablet (12 5 mg total) by mouth daily, Disp: 30 tablet, Rfl: 0  •  Insulin Glargine Solostar (Lantus SoloStar) 100 UNIT/ML SOPN, Inject 0 2 mL (20 Units total) under the skin every evening, Disp: 15 mL, Rfl: 3  •  Insulin Pen Needle 32G X 4 MM MISC, Use every evening, Disp: 100 each, Rfl: 5  •  isosorbide mononitrate (IMDUR) 60 mg 24 hr tablet, Take 1 tablet (60 mg total) by mouth daily, Disp: 90 tablet, Rfl: 2  •  Jardiance 25 MG TABS, TAKE 1 TABLET BY MOUTH IN  THE MORNING, Disp: 90 tablet, Rfl: 3  •  losartan (COZAAR) 100 MG tablet, TAKE 1 TABLET BY MOUTH  DAILY, Disp: 90 tablet, Rfl: 3  •  metFORMIN (GLUCOPHAGE) 500 mg tablet, TAKE 1 TABLET BY MOUTH  TWICE DAILY WITH MEALS, Disp: 180 tablet, Rfl: 3  •  metoprolol succinate (TOPROL-XL) 50 mg 24 hr tablet, TAKE 1 TABLET BY MOUTH  DAILY, Disp: 90 tablet, Rfl: 3  •  Multiple Vitamins-Minerals (MULTIVITAMIN MEN 50+ PO), Take by mouth daily, Disp: , Rfl:   •  Omega-3 Fatty Acids (fish oil) 1,000 mg, Take 4,000 mg by mouth daily , Disp: , Rfl:   •  OneTouch Delica Lancets 79N MISC, Check blood sugars twice daily  Please substitute with appropriate alternative as covered by patient's insurance   Dx: E11 65, Disp: 200 each, Rfl: 3  •  sitaGLIPtin (JANUVIA) 100 mg tablet, Take 100 mg by mouth daily, Disp: , Rfl:          Patient Active Problem List   Diagnosis   • Mixed hyperlipidemia   • Benign hypertension with CKD (chronic kidney disease) stage III (HCC)   • Coronary artery disease involving native coronary artery of native heart without angina pectoris   • S/P angioplasty with stent   • S/P CABG x 3   • S/P AAA repair   • S/P prostatectomy   • H/O prostate cancer   • Type 2 diabetes mellitus with diabetic polyneuropathy, without long-term current use of insulin (HCC)   • Varicose veins of left lower extremity   • History of endovascular stent graft for abdominal aortic aneurysm (AAA)   • Bruit (arterial)   • Peripheral artery disease (HCC)   • Type 2 diabetes mellitus with diabetic peripheral angiopathy without gangrene, without long-term current use of insulin (HCC)   • Actinic keratoses   • Stage 3a chronic kidney disease (HCC)   • Unstable angina (HCC)   • Platelets decreased (HCC)   • Gross hematuria   • Chronic systolic heart failure (HCC)   • Mild aortic stenosis   • Chronic kidney disease-mineral and bone disorder             Patient ID: Cora Montez is a [de-identified] y o  male      HPI     The following portions of the patient's history were reviewed and updated as appropriate:      family history includes Alcohol abuse in his father; Diabetes in his mother        reports that he quit smoking about 35 years ago  His smoking use included cigarettes  He has never used smokeless tobacco  He reports current alcohol use of about 14 0 standard drinks per week  He reports that he does not use drugs         Objective:  Patient's shoes and socks removed  Foot ExamPhysical Exam        Physical Exam  Constitutional:       General: He is not in acute distress      Appearance: He is well-developed  He is not ill-appearing, toxic-appearing or diaphoretic  HENT:      Head: Normocephalic     Cardiovascular:      Pulses:           Dorsalis pedis pulses are 1+ on the right side and 1+ on the left side         Posterior tibial pulses are 0 on the right side and 0 on the left side       Comments: Palpable DP pulse, nonpalpable PT pulse, CFT is less than 3 seconds, temperature gradient within normal limit, a trophic skin changes noted with skin thinning in shiny, patient report occasional claudication to bilateral calf, localized edema foot and ankle Q9  Pulmonary:      Effort: Pulmonary effort is normal    Musculoskeletal:         General: Tenderness and deformity present       Comments: Pes planus type foot pain on palpation and range of motion of the 1st MPJ, metatarsal heads bilateral foot, pain on palpation on range of motion of the ST joint, crepitus noted in midfoot joint    Skin:     General: Skin is dry       Capillary Refill: Capillary refill takes 2 to 3 seconds       Comments: Trophic skin changes bilateral foot and ankle, alternating hypopigmentation and hyperpigmentation patches around the foot and ankle on anterior leg, skin is shiny and thin, absent hair growth, atrophy of fat pad    This is now at 0 1 cm² Sumner grade 1 ulcer      puncture wound noted plantar right heel around the insertion of the plantar fascia, will localized edema, no drainage, painful upon palpation with hyperkeratotic cap      Neurological:      Mental Status: He is alert and oriented to person, place, and time       Sensory: Sensory deficit present       Motor: Atrophy present    Charl Horsfall Tendon Reflexes: Reflexes abnormal      Foot Exam     Right Foot/Ankle      Neurovascular  Dorsalis pedis: 1+  Posterior tibial: 0        Left Foot/Ankle       Neurovascular  Dorsalis pedis: 1+  Posterior tibial: 0

## 2023-03-13 NOTE — PROGRESS NOTES
Assessment/Plan:    1  Type 2 diabetes mellitus with diabetic polyneuropathy, without long-term current use of insulin (Formerly [facility])  -     Insulin Glargine Solostar (Lantus SoloStar) 100 UNIT/ML SOPN; Inject 0 22 mL (22 Units total) under the skin every evening  -     Hemoglobin A1C; Future; Expected date: 06/13/2023  -     Comprehensive metabolic panel; Future; Expected date: 06/13/2023  -     CBC and Platelet; Future; Expected date: 06/13/2023    2  Type 2 diabetes mellitus with diabetic peripheral angiopathy without gangrene, without long-term current use of insulin ([place] [address] )    3  Benign hypertension with CKD (chronic kidney disease) stage III (Formerly [facility])  -     Hemoglobin A1C; Future; Expected date: 06/13/2023  -     Comprehensive metabolic panel; Future; Expected date: 06/13/2023  -     CBC and Platelet; Future; Expected date: 06/13/2023    4  Coronary artery disease involving native coronary artery of native heart without angina pectoris    5  Peripheral artery disease ([place] [address] )    6  Stable angina pectoris (Formerly [facility])    7  Stage 3b chronic kidney disease ([place] [address] )  -     Hemoglobin A1C; Future; Expected date: 06/13/2023  -     Comprehensive metabolic panel; Future; Expected date: 06/13/2023  -     CBC and Platelet; Future; Expected date: 06/13/2023    8  Mixed hyperlipidemia  -     Hemoglobin A1C; Future; Expected date: 06/13/2023  -     Comprehensive metabolic panel; Future; Expected date: 06/13/2023  -     CBC and Platelet; Future; Expected date: 06/13/2023    9  Chronic systolic heart failure ([place] [address] )    10  Platelets decreased ([place] [address] )  -     Hemoglobin A1C; Future; Expected date: 06/13/2023  -     Comprehensive metabolic panel; Future; Expected date: 06/13/2023  -     CBC and Platelet; Future; Expected date: 06/13/2023    11  Obesity (BMI 30 0-34 9)    12  BMI 32 0-32 9,adult    13   Impacted cerumen of left ear  Pt has im,pacted ceruman in left ear - advised on debrox        Patient Instructions     Obesity   AMBULATORY CARE:   Obesity means your body mass index (BMI) is greater than 30  Your healthcare provider will use your age, height, and weight to measure your BMI  The risks of obesity include  many health problems, including injuries or physical disability  Diabetes (high blood sugar level)    High blood pressure or high cholesterol    Heart disease    Stroke    Gallbladder or liver disease    Cancer of the colon, breast, prostate, liver, or kidney    Sleep apnea    Arthritis or gout    Screening  is done to check for health conditions before you have signs or symptoms  If you are 28to 79years old, your blood sugar level may be checked every 3 years for signs of prediabetes or diabetes  Your healthcare provider will check your blood pressure at each visit  High blood pressure can lead to a stroke or other problems  Your provider may check for signs of heart disease, cancer, or other health problems  Seek care immediately if:   You have a severe headache, confusion, or difficulty speaking  You have weakness on one side of your body  You have chest pain, sweating, or shortness of breath  Call your doctor if:   You have symptoms of gallbladder or liver disease, such as pain in your upper abdomen  You have knee or hip pain and discomfort while walking  You have symptoms of diabetes, such as intense hunger and thirst, and frequent urination  You have symptoms of sleep apnea, such as snoring or daytime sleepiness  You have questions or concerns about your condition or care  Treatment for obesity  focuses on helping you lose weight to improve your health  Even a small decrease in BMI can reduce the risk for many health problems  Your healthcare provider will help you set a weight-loss goal   Lifestyle changes  are the first step in treating obesity  These include making healthy food choices and getting regular physical activity   Your healthcare provider may suggest a weight-loss program that involves coaching, education, and therapy  Medicine  may help you lose weight when it is used with a healthy foods and physical activity  Surgery  can help you lose weight if you have obesity along with other health problems  Several types of weight-loss surgery are available  Ask your healthcare provider for more information  Tips for safe weight loss:   Set small, realistic goals  An example of a small goal is to walk for 20 minutes 5 days a week  Anther goal is to lose 5% of your body weight  Ask for support  Tell friends, family members, and coworkers about your goals  Ask someone to lose weight with you  You may also want to join a weight-loss support group  Identify foods or triggers that may cause you to overeat  Remove tempting high-calorie foods from your home and workplace  Place a bowl of fresh fruit on your kitchen counter  If stress causes you to eat, find other ways to cope with stress  A counselor or therapist may be able to help you  Track your daily calories and activity  Write down what you eat and drink  Also write down how many minutes of physical activity you do each day  Track your weekly weight  Weigh yourself in the morning, before you eat or drink anything but after you use the bathroom  Use the same scale, in the same place, and in similar clothing each time  Only weigh yourself 1 to 2 times each week, or as directed  You may become discouraged if you weigh yourself every day  Eating changes: You will need to eat 500 to 1,000 fewer calories each day than you currently eat to lose 1 to 2 pounds a week  The following changes will help you cut calories:  Eat smaller portions  Use small plates, no larger than 9 inches in diameter  Fill your plate half full of fruits and vegetables  Measure your food using measuring cups until you know what a serving size looks like  Eat 3 meals and 1 or 2 snacks each day  Plan your meals in advance  Lucas Bojorquez and eat at home most of the time  Eat slowly  Do not skip meals  Skipping meals can lead to overeating later in the day  This can make it harder for you to lose weight  Talk with a dietitian to help you make a meal plan and schedule that is right for you  Eat fruits and vegetables at every meal   They are low in calories and high in fiber, which makes you feel full  Do not add butter, margarine, or cream sauce to vegetables  Use herbs to season steamed vegetables  Eat less fat and fewer fried foods  Eat more baked or grilled chicken and fish  These protein sources are lower in calories and fat than red meat  Limit fast food  Dress your salads with olive oil and vinegar instead of bottled dressing  Limit the amount of sugar you eat  Do not drink sugary beverages  Limit alcohol  Activity changes:  Physical activity is good for your body in many ways  It helps you burn calories and build strong muscles  It decreases stress and depression, and improves your mood  It can also help you sleep better  Talk to your healthcare provider before you begin an exercise program   Exercise for at least 30 minutes 5 days a week  Start slowly  Set aside time each day for physical activity that you enjoy and that is convenient for you  It is best to do both weight training and an activity that increases your heart rate, such as walking, bicycling, or swimming  Find ways to be more active  Do yard work and housecleaning  Walk up the stairs instead of using elevators  Spend your leisure time going to events that require walking, such as outdoor festivals or fairs  This extra physical activity can help you lose weight and keep it off  Follow up with your doctor as directed: You may need to meet with a dietitian  Write down your questions so you remember to ask them during your visits  © Copyright [employer]ie [employer] 2022 Information is for End User's use only and may not be sold, redistributed or otherwise used for commercial purposes    The above information is an  only  It is not intended as medical advice for individual conditions or treatments  Talk to your doctor, nurse or pharmacist before following any medical regimen to see if it is safe and effective for you  Return in 3 months (on 6/13/2023) for Diabetes Follow-up  Subjective:      Patient ID: Brittany Zamora is a [de-identified] y o  male  Chief Complaint   Patient presents with   • Follow-up     Diabetes f/u [facility]       Pt is here for a follow up  Pt had labs    Normally his sugarsa t home are below 120  Does not do PM checks      The following portions of the patient's history were reviewed and updated as appropriate: allergies, current medications, past family history, past medical history, past social history, past surgical history and problem list     Review of Systems   Constitutional: Negative for activity change, appetite change, chills, diaphoresis, fatigue, fever and unexpected weight change  HENT: Negative for congestion, dental problem, ear pain, mouth sores, sinus pressure, sinus pain, sore throat and trouble swallowing  Eyes: Negative for photophobia, discharge and itching  Respiratory: Negative for apnea, chest tightness and shortness of breath  Cardiovascular: Negative for chest pain, palpitations and leg swelling  Gastrointestinal: Negative for abdominal distention, abdominal pain, blood in stool, nausea and vomiting  Endocrine: Negative for cold intolerance, heat intolerance, polydipsia, polyphagia and polyuria  Genitourinary: Negative for difficulty urinating  Musculoskeletal: Negative for arthralgias  Skin: Negative for color change and wound  Neurological: Negative for dizziness, syncope, speech difficulty and headaches  Hematological: Negative for adenopathy  Psychiatric/Behavioral: Negative for agitation and behavioral problems           Current Outpatient Medications   Medication Sig Dispense Refill   • amLODIPine (NORVASC) 10 mg tablet TAKE 1 TABLET BY MOUTH  DAILY 90 tablet 3   • aspirin 81 mg chewable tablet Chew 81 mg daily     • atorvastatin (LIPITOR) 40 mg tablet Take 1 tablet (40 mg total) by mouth daily 90 tablet 3   • Blood Glucose Monitoring Suppl (OneTouch Verio Reflect) w/Device KIT Check blood sugars twice daily  Please substitute with appropriate alternative as covered by patient's insurance  Dx: E11 65 1 kit 0   • cloNIDine (Catapres) 0 1 mg tablet Take 1 tablet (0 1 mg total) by mouth every 12 (twelve) hours 180 tablet 1   • clopidogrel (PLAVIX) 75 mg tablet Take 75 mg by mouth daily     • fenofibrate 160 MG tablet Take 1 tablet (160 mg total) by mouth daily 90 tablet 3   • gabapentin (NEURONTIN) 600 MG tablet TAKE 1 TABLET BY MOUTH  TWICE DAILY 180 tablet 3   • glimepiride (AMARYL) 2 mg tablet TAKE 1 TABLET BY MOUTH  TWICE DAILY 180 tablet 3   • glucose blood (OneTouch Verio) test strip Check blood sugars twice daily  Please substitute with appropriate alternative as covered by patient's insurance   Dx: E11 65 200 each 3   • hydrochlorothiazide (HYDRODIURIL) 12 5 mg tablet Take 1 tablet (12 5 mg total) by mouth daily 30 tablet 0   • Insulin Glargine Solostar (Lantus SoloStar) 100 UNIT/ML SOPN Inject 0 22 mL (22 Units total) under the skin every evening 15 mL 3   • Insulin Pen Needle 32G X 4 MM MISC Use every evening 100 each 5   • isosorbide mononitrate (IMDUR) 60 mg 24 hr tablet Take 1 tablet (60 mg total) by mouth daily 90 tablet 2   • Jardiance 25 MG TABS TAKE 1 TABLET BY MOUTH IN  THE MORNING 90 tablet 3   • losartan (COZAAR) 100 MG tablet TAKE 1 TABLET BY MOUTH  DAILY 90 tablet 3   • metFORMIN (GLUCOPHAGE) 500 mg tablet TAKE 1 TABLET BY MOUTH  TWICE DAILY WITH MEALS 180 tablet 3   • metoprolol succinate (TOPROL-XL) 50 mg 24 hr tablet TAKE 1 TABLET BY MOUTH  DAILY 90 tablet 3   • Multiple Vitamins-Minerals (MULTIVITAMIN MEN 50+ PO) Take by mouth daily     • nitroglycerin (NITROSTAT) 0 4 mg SL tablet Place 1 tablet (0 4 mg total) under the tongue every 5 (five) minutes as needed for chest pain 30 tablet 3   • Omega-3 Fatty Acids (fish oil) 1,000 mg Take 4,000 mg by mouth daily      • OneTouch Delica Lancets 59H MISC Check blood sugars twice daily  Please substitute with appropriate alternative as covered by patient's insurance  Dx: E11 65 200 each 3   • ranolazine (RANEXA) 500 mg 12 hr tablet Take 1 tablet (500 mg total) by mouth 2 (two) times a day 60 tablet 3   • sitaGLIPtin (JANUVIA) 100 mg tablet Take 100 mg by mouth daily     • cadexomer iodine (IODOSORB) 0 9 % gel Apply 1 application topically daily as needed for wound care (Patient not taking: Reported on 3/13/2023) 10 g 0     No current facility-administered medications for this visit  Objective:    /80   Pulse 68   Temp (!) 97 °F (36 1 °C)   Resp 16   Ht 6' 2" (1 88 m)   Wt 113 kg (250 lb)   BMI 32 10 kg/m²        Physical Exam  Vitals and nursing note reviewed  Constitutional:       General: He is not in acute distress  Appearance: He is well-developed  He is not diaphoretic  HENT:      Head: Normocephalic and atraumatic  Right Ear: External ear normal       Left Ear: External ear normal  There is impacted cerumen  Nose: Nose normal       Mouth/Throat:      Pharynx: No oropharyngeal exudate  Eyes:      General: No scleral icterus  Right eye: No discharge  Left eye: No discharge  Pupils: Pupils are equal, round, and reactive to light  Neck:      Thyroid: No thyromegaly  Cardiovascular:      Rate and Rhythm: Normal rate  Pulses: no weak pulses          Dorsalis pedis pulses are 2+ on the right side and 2+ on the left side  Posterior tibial pulses are 2+ on the right side and 2+ on the left side  Heart sounds: Normal heart sounds  No murmur heard  Pulmonary:      Effort: Pulmonary effort is normal  No respiratory distress  Breath sounds: Normal breath sounds  No wheezing     Abdominal: General: Bowel sounds are normal  There is no distension  Palpations: Abdomen is soft  There is no mass  Tenderness: There is no abdominal tenderness  There is no guarding or rebound  Musculoskeletal:         General: Normal range of motion  Feet:      Right foot:      Skin integrity: No ulcer, skin breakdown, erythema, warmth, callus or dry skin  Left foot:      Skin integrity: No ulcer, skin breakdown, erythema, warmth, callus or dry skin  Skin:     General: Skin is warm and dry  Findings: No erythema or rash  Neurological:      Mental Status: He is alert  Coordination: Coordination normal       Deep Tendon Reflexes: Reflexes normal    Psychiatric:         Behavior: Behavior normal          Recent Results (from the past 672 hour(s))   ECG 12 lead    Collection Time: 03/01/23  6:06 PM   Result Value Ref Range    Ventricular Rate 80 BPM    Atrial Rate 100 BPM    KS Interval 178 ms    QRSD Interval 116 ms    QT Interval 418 ms    QTC Interval 482 ms    P Axis 62 degrees    QRS Axis -59 degrees    T Wave Axis -15 degrees   ECG 12 lead    Collection Time: 03/01/23  6:12 PM   Result Value Ref Range    Ventricular Rate 78 BPM    Atrial Rate 40 BPM    KS Interval  ms    QRSD Interval 116 ms    QT Interval 394 ms    QTC Interval 449 ms    P Axis 100 degrees    QRS Axis -59 degrees    T Wave Axis 98 degrees   ECG 12 lead    Collection Time: 03/02/23  8:52 AM   Result Value Ref Range    Ventricular Rate 130 BPM    Atrial Rate 129 BPM    KS Interval  ms    QRSD Interval 86 ms    QT Interval 310 ms    QTC Interval 456 ms    P Axis  degrees    QRS Axis 108 degrees    T Wave Axis 182 degrees   Hemoglobin A1C    Collection Time: 03/09/23  7:45 AM   Result Value Ref Range    Hemoglobin A1C 7 6 (H) Normal 3 8-5 6%; PreDiabetic 5 7-6 4%;  Diabetic >=6 5%; Glycemic control for adults with diabetes <7 0% %     mg/dl   Comprehensive metabolic panel    Collection Time: 03/09/23  7:45 AM   Result Value Ref Range    Sodium 139 135 - 147 mmol/L    Potassium 4 7 3 5 - 5 3 mmol/L    Chloride 105 96 - 108 mmol/L    CO2 27 21 - 32 mmol/L    ANION GAP 7 4 - 13 mmol/L    BUN 30 (H) 5 - 25 mg/dL    Creatinine 1 46 (H) 0 60 - 1 30 mg/dL    Glucose, Fasting 98 65 - 99 mg/dL    Calcium 9 9 8 3 - 10 1 mg/dL    Corrected Calcium 10 5 (H) 8 3 - 10 1 mg/dL    AST 23 5 - 45 U/L    ALT 30 12 - 78 U/L    Alkaline Phosphatase 42 (L) 46 - 116 U/L    Total Protein 8 1 6 4 - 8 4 g/dL    Albumin 3 3 (L) 3 5 - 5 0 g/dL    Total Bilirubin 0 43 0 20 - 1 00 mg/dL    eGFR 44 ml/min/1 73sq m   CBC and Platelet    Collection Time: 03/09/23  7:45 AM   Result Value Ref Range    WBC 9 71 4 31 - 10 16 Thousand/uL    RBC 4 96 3 88 - 5 62 Million/uL    Hemoglobin 13 5 12 0 - 17 0 g/dL    Hematocrit 42 8 36 5 - 49 3 %    MCV 86 82 - 98 fL    MCH 27 2 26 8 - 34 3 pg    MCHC 31 5 31 4 - 37 4 g/dL    RDW 12 8 11 6 - 15 1 %    Platelets 565 110 - 970 Thousands/uL    MPV 10 5 8 9 - 12 7 fL   Lipid Panel with Direct LDL reflex    Collection Time: 03/09/23  7:45 AM   Result Value Ref Range    Cholesterol 114 See Comment mg/dL    Triglycerides 93 See Comment mg/dL    HDL, Direct 39 (L) >=40 mg/dL    LDL Calculated 56 0 - 100 mg/dL   Microalbumin / creatinine urine ratio    Collection Time: 03/09/23  7:45 AM   Result Value Ref Range    Creatinine, Ur 61 6 mg/dL    Microalbum  ,U,Random 18 3 0 0 - 20 0 mg/L    Microalb Creat Ratio 30 0 - 30 mg/g creatinine        Diabetic Foot Exam    Patient's shoes and socks removed  Right Foot/Ankle   Right Foot Inspection  Skin Exam: skin normal  Skin not intact, no dry skin, no warmth, no callus, no erythema, no maceration, no abnormal color, no pre-ulcer, no ulcer and no callus  Toe Exam: ROM and strength within normal limits  Sensory   Vibration: intact  Proprioception: intact  Monofilament testing: diminished    Vascular  Capillary refills: < 3 seconds  The right DP pulse is 2+  The right PT pulse is 2+  Left Foot/Ankle  Left Foot Inspection  Skin Exam: skin normal  Skin not intact, no dry skin, no warmth, no erythema, no maceration, normal color, no pre-ulcer, no ulcer and no callus  Toe Exam: ROM and strength within normal limits  Sensory   Vibration: intact  Proprioception: intact  Monofilament testing: absent    Vascular  Capillary refills: < 3 seconds  The left DP pulse is 2+  The left PT pulse is 2+  Assign Risk Category  No deformity present  No loss of protective sensation  No weak pulses  Risk: 1      Calli Miller,   BMI Counseling: Body mass index is 32 1 kg/m²  The BMI is above normal  Nutrition recommendations include reducing portion sizes

## 2023-03-16 DIAGNOSIS — E78.2 MIXED HYPERLIPIDEMIA: ICD-10-CM

## 2023-03-16 DIAGNOSIS — I25.10 CORONARY ARTERY DISEASE INVOLVING NATIVE CORONARY ARTERY OF NATIVE HEART WITHOUT ANGINA PECTORIS: ICD-10-CM

## 2023-03-17 DIAGNOSIS — E11.42 TYPE 2 DIABETES MELLITUS WITH DIABETIC POLYNEUROPATHY, WITHOUT LONG-TERM CURRENT USE OF INSULIN (HCC): ICD-10-CM

## 2023-03-17 RX ORDER — ATORVASTATIN CALCIUM 40 MG/1
TABLET, FILM COATED ORAL
Qty: 90 TABLET | Refills: 3 | Status: SHIPPED | OUTPATIENT
Start: 2023-03-17

## 2023-03-17 RX ORDER — INSULIN GLARGINE 100 [IU]/ML
22 INJECTION, SOLUTION SUBCUTANEOUS EVERY EVENING
Qty: 15 ML | Refills: 3 | Status: SHIPPED | OUTPATIENT
Start: 2023-03-17 | End: 2023-09-13

## 2023-03-17 NOTE — TELEPHONE ENCOUNTER
Patient left message on refill line stating his Lantus Rx went to [facility] in [place], it should of went to [facility]  I did call [facility] in [place] and cancelled the Rx order    Thank You

## 2023-03-27 DIAGNOSIS — N18.31 STAGE 3A CHRONIC KIDNEY DISEASE (HCC): ICD-10-CM

## 2023-03-27 DIAGNOSIS — N18.30 BENIGN HYPERTENSION WITH CKD (CHRONIC KIDNEY DISEASE) STAGE III (HCC): ICD-10-CM

## 2023-03-27 DIAGNOSIS — I12.9 BENIGN HYPERTENSION WITH CKD (CHRONIC KIDNEY DISEASE) STAGE III (HCC): ICD-10-CM

## 2023-03-27 RX ORDER — HYDROCHLOROTHIAZIDE 12.5 MG/1
12.5 TABLET ORAL DAILY
Qty: 30 TABLET | Refills: 0 | Status: SHIPPED | OUTPATIENT
Start: 2023-03-27

## 2023-05-11 ENCOUNTER — APPOINTMENT (EMERGENCY)
Dept: RADIOLOGY | Facility: HOSPITAL | Age: 78
End: 2023-05-11

## 2023-05-11 ENCOUNTER — HOSPITAL ENCOUNTER (OUTPATIENT)
Facility: HOSPITAL | Age: 78
Setting detail: OBSERVATION
Discharge: HOME | End: 2023-05-12
Attending: GENERAL PRACTICE | Admitting: STUDENT IN AN ORGANIZED HEALTH CARE EDUCATION/TRAINING PROGRAM

## 2023-05-11 DIAGNOSIS — R11.2 NAUSEA & VOMITING: ICD-10-CM

## 2023-05-11 DIAGNOSIS — R73.9 HYPERGLYCEMIA: ICD-10-CM

## 2023-05-11 DIAGNOSIS — K57.90 DIVERTICULOSIS: ICD-10-CM

## 2023-05-11 DIAGNOSIS — R77.8 ELEVATED TROPONIN: Primary | ICD-10-CM

## 2023-05-11 DIAGNOSIS — R19.7 DIARRHEA: ICD-10-CM

## 2023-05-11 PROBLEM — R79.89 ELEVATED TROPONIN LEVEL NOT DUE MYOCARDIAL INFARCTION: Status: ACTIVE | Noted: 2023-05-11

## 2023-05-11 PROBLEM — I16.0 HYPERTENSIVE URGENCY: Status: ACTIVE | Noted: 2023-05-11

## 2023-05-11 LAB
2HR DELTA HS TROPONIN: -33 NG/L
4HR DELTA HS TROPONIN: -21 NG/L
ALBUMIN SERPL BCP-MCNC: 4.2 G/DL (ref 3.5–5)
ALP SERPL-CCNC: 40 U/L (ref 34–104)
ALT SERPL W P-5'-P-CCNC: 45 U/L (ref 7–52)
ANION GAP SERPL CALCULATED.3IONS-SCNC: 10 MMOL/L (ref 4–13)
APTT PPP: 28 SECONDS (ref 23–37)
AST SERPL W P-5'-P-CCNC: 37 U/L (ref 13–39)
BACTERIA UR QL AUTO: NORMAL /HPF
BASOPHILS # BLD AUTO: 0.01 THOUSANDS/ÂΜL (ref 0–0.1)
BASOPHILS NFR BLD AUTO: 0 % (ref 0–1)
BILIRUB SERPL-MCNC: 0.64 MG/DL (ref 0.2–1)
BILIRUB UR QL STRIP: NEGATIVE
BUN SERPL-MCNC: 25 MG/DL (ref 5–25)
CALCIUM SERPL-MCNC: 9.2 MG/DL (ref 8.4–10.2)
CARDIAC TROPONIN I PNL SERPL HS: 101 NG/L
CARDIAC TROPONIN I PNL SERPL HS: 113 NG/L
CARDIAC TROPONIN I PNL SERPL HS: 134 NG/L
CHLORIDE SERPL-SCNC: 105 MMOL/L (ref 96–108)
CLARITY UR: CLEAR
CO2 SERPL-SCNC: 24 MMOL/L (ref 21–32)
COLOR UR: YELLOW
CREAT SERPL-MCNC: 1.23 MG/DL (ref 0.6–1.3)
EOSINOPHIL # BLD AUTO: 0.04 THOUSAND/ÂΜL (ref 0–0.61)
EOSINOPHIL NFR BLD AUTO: 1 % (ref 0–6)
ERYTHROCYTE [DISTWIDTH] IN BLOOD BY AUTOMATED COUNT: 14.4 % (ref 11.6–15.1)
GFR SERPL CREATININE-BSD FRML MDRD: 55 ML/MIN/1.73SQ M
GLUCOSE SERPL-MCNC: 103 MG/DL (ref 65–140)
GLUCOSE SERPL-MCNC: 151 MG/DL (ref 65–140)
GLUCOSE SERPL-MCNC: 163 MG/DL (ref 65–140)
GLUCOSE UR STRIP-MCNC: ABNORMAL MG/DL
HCT VFR BLD AUTO: 45.1 % (ref 36.5–49.3)
HGB BLD-MCNC: 14.6 G/DL (ref 12–17)
HGB UR QL STRIP.AUTO: NEGATIVE
IMM GRANULOCYTES # BLD AUTO: 0.01 THOUSAND/UL (ref 0–0.2)
IMM GRANULOCYTES NFR BLD AUTO: 0 % (ref 0–2)
INR PPP: 1.1 (ref 0.84–1.19)
KETONES UR STRIP-MCNC: ABNORMAL MG/DL
LEUKOCYTE ESTERASE UR QL STRIP: ABNORMAL
LIPASE SERPL-CCNC: 31 U/L (ref 11–82)
LYMPHOCYTES # BLD AUTO: 0.89 THOUSANDS/ÂΜL (ref 0.6–4.47)
LYMPHOCYTES NFR BLD AUTO: 16 % (ref 14–44)
MAGNESIUM SERPL-MCNC: 1.9 MG/DL (ref 1.9–2.7)
MCH RBC QN AUTO: 27.9 PG (ref 26.8–34.3)
MCHC RBC AUTO-ENTMCNC: 32.4 G/DL (ref 31.4–37.4)
MCV RBC AUTO: 86 FL (ref 82–98)
MONOCYTES # BLD AUTO: 0.36 THOUSAND/ÂΜL (ref 0.17–1.22)
MONOCYTES NFR BLD AUTO: 7 % (ref 4–12)
NEUTROPHILS # BLD AUTO: 4.27 THOUSANDS/ÂΜL (ref 1.85–7.62)
NEUTS SEG NFR BLD AUTO: 76 % (ref 43–75)
NITRITE UR QL STRIP: NEGATIVE
NON-SQ EPI CELLS URNS QL MICRO: NORMAL /HPF
NRBC BLD AUTO-RTO: 0 /100 WBCS
PH UR STRIP.AUTO: 5.5 [PH]
PLATELET # BLD AUTO: 186 THOUSANDS/UL (ref 149–390)
PLATELET # BLD AUTO: 195 THOUSANDS/UL (ref 149–390)
PMV BLD AUTO: 10.7 FL (ref 8.9–12.7)
PMV BLD AUTO: 10.8 FL (ref 8.9–12.7)
POTASSIUM SERPL-SCNC: 4.4 MMOL/L (ref 3.5–5.3)
PROT SERPL-MCNC: 8.1 G/DL (ref 6.4–8.4)
PROT UR STRIP-MCNC: NEGATIVE MG/DL
PROTHROMBIN TIME: 14.3 SECONDS (ref 11.6–14.5)
RBC # BLD AUTO: 5.23 MILLION/UL (ref 3.88–5.62)
RBC #/AREA URNS AUTO: NORMAL /HPF
SODIUM SERPL-SCNC: 139 MMOL/L (ref 135–147)
SP GR UR STRIP.AUTO: 1.01 (ref 1–1.03)
UROBILINOGEN UR QL STRIP.AUTO: 0.2 E.U./DL
WBC # BLD AUTO: 5.58 THOUSAND/UL (ref 4.31–10.16)
WBC #/AREA URNS AUTO: NORMAL /HPF

## 2023-05-11 RX ORDER — HEPARIN SODIUM 5000 [USP'U]/ML
5000 INJECTION, SOLUTION INTRAVENOUS; SUBCUTANEOUS EVERY 8 HOURS SCHEDULED
Status: DISCONTINUED | OUTPATIENT
Start: 2023-05-11 | End: 2023-05-12 | Stop reason: HOSPADM

## 2023-05-11 RX ORDER — LOSARTAN POTASSIUM 50 MG/1
100 TABLET ORAL DAILY
Status: DISCONTINUED | OUTPATIENT
Start: 2023-05-11 | End: 2023-05-12 | Stop reason: HOSPADM

## 2023-05-11 RX ORDER — NITROGLYCERIN 0.4 MG/1
0.4 TABLET SUBLINGUAL
Status: DISCONTINUED | OUTPATIENT
Start: 2023-05-11 | End: 2023-05-12 | Stop reason: HOSPADM

## 2023-05-11 RX ORDER — MAGNESIUM HYDROXIDE/ALUMINUM HYDROXICE/SIMETHICONE 120; 1200; 1200 MG/30ML; MG/30ML; MG/30ML
30 SUSPENSION ORAL EVERY 6 HOURS PRN
Status: DISCONTINUED | OUTPATIENT
Start: 2023-05-11 | End: 2023-05-12 | Stop reason: HOSPADM

## 2023-05-11 RX ORDER — CLONIDINE HYDROCHLORIDE 0.1 MG/1
0.1 TABLET ORAL EVERY 12 HOURS SCHEDULED
Status: DISCONTINUED | OUTPATIENT
Start: 2023-05-11 | End: 2023-05-12 | Stop reason: HOSPADM

## 2023-05-11 RX ORDER — HYDRALAZINE HYDROCHLORIDE 20 MG/ML
5 INJECTION INTRAMUSCULAR; INTRAVENOUS EVERY 6 HOURS PRN
Status: DISCONTINUED | OUTPATIENT
Start: 2023-05-11 | End: 2023-05-12 | Stop reason: HOSPADM

## 2023-05-11 RX ORDER — INSULIN GLARGINE 100 [IU]/ML
11 INJECTION, SOLUTION SUBCUTANEOUS
Status: DISCONTINUED | OUTPATIENT
Start: 2023-05-11 | End: 2023-05-12 | Stop reason: HOSPADM

## 2023-05-11 RX ORDER — PANTOPRAZOLE SODIUM 40 MG/10ML
40 INJECTION, POWDER, LYOPHILIZED, FOR SOLUTION INTRAVENOUS
Status: DISCONTINUED | OUTPATIENT
Start: 2023-05-11 | End: 2023-05-12 | Stop reason: HOSPADM

## 2023-05-11 RX ORDER — ATORVASTATIN CALCIUM 40 MG/1
40 TABLET, FILM COATED ORAL
Status: DISCONTINUED | OUTPATIENT
Start: 2023-05-11 | End: 2023-05-12 | Stop reason: HOSPADM

## 2023-05-11 RX ORDER — RANOLAZINE 500 MG/1
500 TABLET, EXTENDED RELEASE ORAL 2 TIMES DAILY
Status: DISCONTINUED | OUTPATIENT
Start: 2023-05-11 | End: 2023-05-12 | Stop reason: HOSPADM

## 2023-05-11 RX ORDER — ONDANSETRON 2 MG/ML
4 INJECTION INTRAMUSCULAR; INTRAVENOUS ONCE
Status: COMPLETED | OUTPATIENT
Start: 2023-05-11 | End: 2023-05-11

## 2023-05-11 RX ORDER — ONDANSETRON 2 MG/ML
4 INJECTION INTRAMUSCULAR; INTRAVENOUS EVERY 6 HOURS PRN
Status: DISCONTINUED | OUTPATIENT
Start: 2023-05-11 | End: 2023-05-12 | Stop reason: HOSPADM

## 2023-05-11 RX ORDER — INSULIN LISPRO 100 [IU]/ML
1-5 INJECTION, SOLUTION INTRAVENOUS; SUBCUTANEOUS
Status: DISCONTINUED | OUTPATIENT
Start: 2023-05-11 | End: 2023-05-12 | Stop reason: HOSPADM

## 2023-05-11 RX ORDER — FENOFIBRATE 48 MG/1
160 TABLET, COATED ORAL DAILY
Status: DISCONTINUED | OUTPATIENT
Start: 2023-05-11 | End: 2023-05-12 | Stop reason: HOSPADM

## 2023-05-11 RX ORDER — ISOSORBIDE MONONITRATE 60 MG/1
60 TABLET, EXTENDED RELEASE ORAL DAILY
Status: DISCONTINUED | OUTPATIENT
Start: 2023-05-11 | End: 2023-05-12 | Stop reason: HOSPADM

## 2023-05-11 RX ORDER — GABAPENTIN 300 MG/1
600 CAPSULE ORAL 2 TIMES DAILY
Status: DISCONTINUED | OUTPATIENT
Start: 2023-05-11 | End: 2023-05-12 | Stop reason: HOSPADM

## 2023-05-11 RX ORDER — SODIUM CHLORIDE 9 MG/ML
75 INJECTION, SOLUTION INTRAVENOUS ONCE
Status: COMPLETED | OUTPATIENT
Start: 2023-05-11 | End: 2023-05-12

## 2023-05-11 RX ORDER — AMLODIPINE BESYLATE 10 MG/1
10 TABLET ORAL DAILY
Status: DISCONTINUED | OUTPATIENT
Start: 2023-05-11 | End: 2023-05-12 | Stop reason: HOSPADM

## 2023-05-11 RX ORDER — ASPIRIN 81 MG/1
81 TABLET, CHEWABLE ORAL DAILY
Status: DISCONTINUED | OUTPATIENT
Start: 2023-05-11 | End: 2023-05-12 | Stop reason: HOSPADM

## 2023-05-11 RX ORDER — CLOPIDOGREL BISULFATE 75 MG/1
75 TABLET ORAL DAILY
Status: DISCONTINUED | OUTPATIENT
Start: 2023-05-11 | End: 2023-05-12 | Stop reason: HOSPADM

## 2023-05-11 RX ORDER — METOPROLOL SUCCINATE 50 MG/1
50 TABLET, EXTENDED RELEASE ORAL DAILY
Status: DISCONTINUED | OUTPATIENT
Start: 2023-05-11 | End: 2023-05-12 | Stop reason: HOSPADM

## 2023-05-11 RX ORDER — CHLORAL HYDRATE 500 MG
4000 CAPSULE ORAL DAILY
Status: DISCONTINUED | OUTPATIENT
Start: 2023-05-11 | End: 2023-05-12 | Stop reason: HOSPADM

## 2023-05-11 RX ADMIN — HEPARIN SODIUM 5000 UNITS: 5000 INJECTION INTRAVENOUS; SUBCUTANEOUS at 15:27

## 2023-05-11 RX ADMIN — INSULIN LISPRO 1 UNITS: 100 INJECTION, SOLUTION INTRAVENOUS; SUBCUTANEOUS at 21:38

## 2023-05-11 RX ADMIN — IOHEXOL 100 ML: 350 INJECTION, SOLUTION INTRAVENOUS at 10:23

## 2023-05-11 RX ADMIN — ISOSORBIDE MONONITRATE 60 MG: 60 TABLET, EXTENDED RELEASE ORAL at 15:26

## 2023-05-11 RX ADMIN — LOSARTAN POTASSIUM 100 MG: 50 TABLET, FILM COATED ORAL at 15:26

## 2023-05-11 RX ADMIN — HEPARIN SODIUM 5000 UNITS: 5000 INJECTION INTRAVENOUS; SUBCUTANEOUS at 21:36

## 2023-05-11 RX ADMIN — GABAPENTIN 600 MG: 300 CAPSULE ORAL at 17:18

## 2023-05-11 RX ADMIN — OMEGA-3 FATTY ACIDS CAP 1000 MG 4000 MG: 1000 CAP at 15:26

## 2023-05-11 RX ADMIN — ONDANSETRON 4 MG: 2 INJECTION INTRAMUSCULAR; INTRAVENOUS at 09:29

## 2023-05-11 RX ADMIN — METOPROLOL SUCCINATE 50 MG: 50 TABLET, EXTENDED RELEASE ORAL at 15:26

## 2023-05-11 RX ADMIN — CLONIDINE HYDROCHLORIDE 0.1 MG: 0.1 TABLET ORAL at 15:26

## 2023-05-11 RX ADMIN — FENOFIBRATE 168 MG: 48 TABLET, FILM COATED ORAL at 15:25

## 2023-05-11 RX ADMIN — SODIUM CHLORIDE 75 ML/HR: 0.9 INJECTION, SOLUTION INTRAVENOUS at 15:27

## 2023-05-11 RX ADMIN — PANTOPRAZOLE SODIUM 40 MG: 40 INJECTION, POWDER, FOR SOLUTION INTRAVENOUS at 15:27

## 2023-05-11 RX ADMIN — RANOLAZINE 500 MG: 500 TABLET, EXTENDED RELEASE ORAL at 20:47

## 2023-05-11 RX ADMIN — INSULIN GLARGINE 11 UNITS: 100 INJECTION, SOLUTION SUBCUTANEOUS at 21:35

## 2023-05-11 RX ADMIN — ATORVASTATIN CALCIUM 40 MG: 40 TABLET, FILM COATED ORAL at 15:32

## 2023-05-11 RX ADMIN — ASPIRIN 81 MG CHEWABLE TABLET 81 MG: 81 TABLET CHEWABLE at 15:26

## 2023-05-11 RX ADMIN — SODIUM CHLORIDE 1000 ML: 0.9 INJECTION, SOLUTION INTRAVENOUS at 09:24

## 2023-05-11 RX ADMIN — AMLODIPINE BESYLATE 10 MG: 10 TABLET ORAL at 15:26

## 2023-05-11 RX ADMIN — CLOPIDOGREL BISULFATE 75 MG: 75 TABLET ORAL at 15:26

## 2023-05-11 NOTE — ASSESSMENT & PLAN NOTE
· Patient admitted with elevated troponins, which are down-trending  Suspect elevation is secondary to hypertensive urgency secondary to inability to take medications as directed along with mild dehydration  · EKG is stable  · Will remain on telemetry overnight

## 2023-05-11 NOTE — ASSESSMENT & PLAN NOTE
· CT abdomen/pelvis:  No evidence of acute process  Colonic diverticulosis  · Suspect acute viral gastroenteritis  · Continue with IV PPI, 1 more liter of IVFs and anti-emetics  · Advance diet as tolerated

## 2023-05-11 NOTE — H&P
[address]  H&P  Name: Edna West [de-identified] y o  male I MRN: [id number]  Unit/Bed#: 4 [address] Date of Admission: 5/11/2023   Date of Service: 5/11/2023 I Hospital Day: 0      Assessment/Plan   * Intractable nausea and vomiting  Assessment & Plan  · CT abdomen/pelvis:  No evidence of acute process  Colonic diverticulosis  · Suspect acute viral gastroenteritis  · Continue with IV PPI, 1 more liter of IVFs and anti-emetics  · Advance diet as tolerated  Elevated troponin level not due myocardial infarction  Assessment & Plan  · Patient admitted with elevated troponins, which are down-trending  Suspect elevation is secondary to hypertensive urgency secondary to inability to take medications as directed along with mild dehydration  · EKG is stable  · Will remain on telemetry overnight  Hypertensive urgency  Assessment & Plan  · Poorly controlled BPs secondary to inability to tolerate PO intake and take home medications  · Patient now able to tolerate PO  Restart home medications with the exception of HCTZ for now  · Add PRN IV hydralazine  Type 2 diabetes mellitus with diabetic polyneuropathy, without long-term current use of insulin (HCC)  Assessment & Plan  Lab Results   Component Value Date    HGBA1C 7 6 (H) 03/09/2023     Blood Sugar Average: Last 72 hrs:  · Patient reports taking Metformin, glimepiride, jardiance and Lantus 22 units daily at home  · Hold oral agents during hospitalization  Resume at discharge  · Continue Lantus but order at 11 units only given poor PO intake  Resume home regimen when tolerating full PO intake  · Accu-checks and SSI  · ADA diet      Benign hypertension with CKD (chronic kidney disease) stage III [facility])  Assessment & Plan  Lab Results   Component Value Date    EGFR 55 05/11/2023    EGFR 44 03/09/2023    EGFR 44 01/12/2023    CREATININE 1 23 05/11/2023    CREATININE 1 46 (H) 03/09/2023    CREATININE 1 48 (H) 01/12/2023     · Baseline creatinine: 1 4  · Current creatinine below baseline  Continue to monitor closely  · Blood pressures are poorly controlled on admission  Likely secondary to patient being unable to keep down his medications over the past few days  · Home regimen includes: Amlodipine 10 mg daily, clonidine 0 1 mg BID, losartan 100 mg daily, metoprolol succinate 50 mg daily and HCTZ 12 5 mg daily  Will restart home medications with the exception of HCTZ for now  Patient was able to tolerate water in the ED  Resume HCTZ at discharge  VTE Pharmacologic Prophylaxis: VTE Score: 5 High Risk (Score >/= 5) - Pharmacological DVT Prophylaxis Ordered: heparin  Sequential Compression Devices Ordered  Code Status: Level 3 - DNAR and DNI   Discussion with family: Updated  (son) at bedside  Anticipated Length of Stay: Patient will be admitted on an observation basis with an anticipated length of stay of less than 2 midnights secondary to rapid recovery       Total Time Spent on Date of Encounter in care of patient: 55 minutes This time was spent on one or more of the following: performing physical exam; counseling and coordination of care; obtaining or reviewing history; documenting in the medical record; reviewing/ordering tests, medications or procedures; communicating with other healthcare professionals and discussing with patient's family/caregivers  Chief Complaint: Intractable nausea, vomiting and diarrhea    History of Present Illness:  Pablo Rucker is a [de-identified] y o  male with a PMH of CAD, HTN, DM2, history of prostate cancer, history of AAA repair who presents with tractable nausea and vomiting was diarrhea  Patient reports he developed some intractable nausea and diarrhea on Tuesday evening  He woke Tuesday morning feeling a little bit better but then developed some intractable vomiting later that day    Since that time he has been unable to tolerate adequate p o  intake occluding his daily medications for this reason was brought into the hospital by his family  In the emergency department he received IV fluids and states he feels much better following that  In fact he is hungry and asking for Coke  At the present time he is complaining of some mild lightheadedness but denies any nausea at this time  He denies any melena, hematochezia or hemoptysis  In the ER he was found to have elevated troponin as well as hypertensive urgency  At this time he denies any chest pain, chest pressure or shortness of breath  He states he is undergoing a lot of stress at home most specifically placing his wife in hospice care today  Review of Systems:  Review of Systems   Constitutional: Negative for appetite change, chills, diaphoresis, fatigue, fever and unexpected weight change  HENT: Negative for congestion and sore throat  Eyes: Negative for visual disturbance  Respiratory: Negative for cough, chest tightness, shortness of breath and wheezing  Cardiovascular: Negative for chest pain, palpitations and leg swelling  Gastrointestinal: Positive for diarrhea, nausea and vomiting  Negative for abdominal distention, abdominal pain, blood in stool and constipation  Genitourinary: Negative for difficulty urinating, dysuria, frequency, hematuria and urgency  Musculoskeletal: Negative for back pain, gait problem and neck pain  Skin: Negative for rash  Neurological: Negative for dizziness, tremors, speech difficulty, weakness, light-headedness, numbness and headaches  Psychiatric/Behavioral: Negative for confusion  The patient is not nervous/anxious  All other systems reviewed and are negative        Past Medical and Surgical History:   Past Medical History:   Diagnosis Date   • CAD (coronary artery disease)    • Cancer ([facility] )     prostate   • CHF (congestive heart failure) ([facility] )    • Diabetes mellitus ([facility] )    • Myocardial infarction [facility])        Past Surgical History:   Procedure Laterality Date   • ADENOIDECTOMY     • CARDIAC CATHETERIZATION Left 10/19/2022    Procedure: Cardiac Left Heart Cath;  Surgeon: Steve Baez MD;  Location: [facility] CARDIAC [facility]; Service: Cardiology   • CARDIAC SURGERY  2002    3 cardiac bypass then angioplasty 7/2020   • CHOLECYSTECTOMY     • CORONARY ARTERY BYPASS GRAFT     • PROSTATE SURGERY     • TONSILLECTOMY         Meds/Allergies:  Prior to Admission medications    Medication Sig Start Date End Date Taking?  Authorizing Provider   amLODIPine (NORVASC) 10 mg tablet TAKE 1 TABLET BY MOUTH  DAILY 11/30/22  Yes Alla Aschoff, DO   aspirin 81 mg chewable tablet Chew 81 mg daily   Yes Historical Provider, MD   atorvastatin (LIPITOR) 40 mg tablet TAKE 1 TABLET BY MOUTH  DAILY 3/17/23  Yes Masha López MD   cadexomer iodine (IODOSORB) 0 9 % gel Apply 1 application topically daily as needed for wound care 1/18/23  Yes James Clancy DPM   cloNIDine (Catapres) 0 1 mg tablet Take 1 tablet (0 1 mg total) by mouth every 12 (twelve) hours  Patient taking differently: Take 0 1 mg by mouth every 12 (twelve) hours 11/11/22 5/11/23 Yes Alla Aschoff, DO   clopidogrel (PLAVIX) 75 mg tablet Take 75 mg by mouth daily   Yes Historical Provider, MD   fenofibrate 160 MG tablet TAKE 1 TABLET BY MOUTH  DAILY 4/10/23  Yes Masha López MD   gabapentin (NEURONTIN) 600 MG tablet TAKE 1 TABLET BY MOUTH  TWICE DAILY 2/10/23  Yes Ham Mcnamara DPM   glimepiride (AMARYL) 2 mg tablet TAKE 1 TABLET BY MOUTH  TWICE DAILY 3/1/23  Yes Alla Aschoff, DO   hydrochlorothiazide (HYDRODIURIL) 12 5 mg tablet TAKE 1 TABLET BY MOUTH DAILY 4/11/23  Yes Alla Aschoff, DO   Insulin Glargine Solostar (Lantus SoloStar) 100 UNIT/ML SOPN Inject 0 22 mL (22 Units total) under the skin every evening 3/17/23 9/13/23 Yes Alla Aschoff, DO   isosorbide mononitrate (IMDUR) 60 mg 24 hr tablet Take 1 tablet (60 mg total) by mouth daily 11/10/22  Yes Mahsa López MD   Jardiance 25 MG TABS TAKE 1 TABLET BY MOUTH IN  THE MORNING 11/18/22  Yes Blane Toussaint, DO   losartan (COZAAR) 100 MG tablet TAKE 1 TABLET BY MOUTH  DAILY 11/30/22  Yes Franck Corral MD   metFORMIN (GLUCOPHAGE) 500 mg tablet TAKE 1 TABLET BY MOUTH  TWICE DAILY WITH MEALS 2/10/23  Yes Blane Toussaint, DO   metoprolol succinate (TOPROL-XL) 50 mg 24 hr tablet TAKE 1 TABLET BY MOUTH  DAILY 11/30/22  Yes Franck Corral MD   Multiple Vitamins-Minerals (MULTIVITAMIN MEN 50+ PO) Take by mouth daily   Yes Historical Provider, MD   Omega-3 Fatty Acids (fish oil) 1,000 mg Take 4,000 mg by mouth daily    Yes Historical Provider, MD   ranolazine (RANEXA) 500 mg 12 hr tablet Take 1 tablet (500 mg total) by mouth 2 (two) times a day 3/7/23  Yes APOLLO Garcia   sitaGLIPtin (JANUVIA) 100 mg tablet Take 100 mg by mouth daily   Yes Historical Provider, MD   Blood Glucose Monitoring Suppl (OneTouch Verio Reflect) w/Device KIT Check blood sugars twice daily  Please substitute with appropriate alternative as covered by patient's insurance  Dx: E11 65 2/22/22   Blane Toussaint, DO   glucose blood (OneTouch Verio) test strip Check blood sugars twice daily  Please substitute with appropriate alternative as covered by patient's insurance  Dx: E11 65 9/13/22   Blane Toussaint, DO   Insulin Pen Needle 32G X 4 MM MISC Use every evening 7/22/22   Blane Toussaint, DO   nitroglycerin (NITROSTAT) 0 4 mg SL tablet Place 1 tablet (0 4 mg total) under the tongue every 5 (five) minutes as needed for chest pain 3/7/23   APOLLO GarciaTouch Delica Lancets 83C MISC Check blood sugars twice daily  Please substitute with appropriate alternative as covered by patient's insurance  Dx: E11 65 2/22/22   Blane Toussaint, DO     I have reviewed home medications with patient personally  Allergies:    Allergies   Allergen Reactions   • Lisinopril Rash and Lip Swelling       Social History:  Marital Status: /Civil Union   Occupation: Retired   Patient Pre-hospital Living Situation: "Home  Patient Pre-hospital Level of Mobility: walks  Patient Pre-hospital Diet Restrictions: Diabetic  Substance Use History:   Social History     Substance and Sexual Activity   Alcohol Use Yes   • Alcohol/week: 14 0 standard drinks   • Types: 14 Cans of beer per week    Comment: 1 -2 daily     Social History     Tobacco Use   Smoking Status Former   • Packs/day: 1 50   • Years: 33 00   • Pack years: 49 50   • Types: Cigarettes   • Start date: 24   • Quit date: 80   • Years since quittin 3   Smokeless Tobacco Never     Social History     Substance and Sexual Activity   Drug Use Never       Family History:  Family History   Problem Relation Age of Onset   • Diabetes Mother    • Alcohol abuse Father    • Mental illness Neg Hx        Physical Exam:     Vitals:   Blood Pressure: 163/72 (23 1219)  Pulse: 64 (23 1219)  Temperature: 97 6 °F (36 4 °C) (23)  Temp Source: Oral (23)  Respirations: 15 (23 1121)  Height: 6' 2\" (188 cm) (23)  Weight - Scale: 109 kg (240 lb 12 8 oz) (23)  SpO2: 97 % (23 1219)    Physical Exam  Vitals reviewed  Constitutional:       General: He is not in acute distress  HENT:      Head: Normocephalic and atraumatic  Mouth/Throat:      Mouth: Mucous membranes are moist    Eyes:      General: No scleral icterus  Extraocular Movements: Extraocular movements intact  Conjunctiva/sclera: Conjunctivae normal    Cardiovascular:      Rate and Rhythm: Normal rate and regular rhythm  Heart sounds: Murmur heard  Pulmonary:      Effort: Pulmonary effort is normal       Breath sounds: Normal breath sounds  Abdominal:      General: Bowel sounds are decreased  Palpations: Abdomen is soft  Tenderness: There is no abdominal tenderness  Musculoskeletal:      Right lower leg: No edema  Left lower leg: No edema  Skin:     Coloration: Skin is not jaundiced  Findings: No rash     Neurological:    " General: No focal deficit present  Mental Status: He is alert  Psychiatric:         Mood and Affect: Mood normal           Additional Data:     Lab Results:  Results from last 7 days   Lab Units 05/11/23  0923   WBC Thousand/uL 5 58   HEMOGLOBIN g/dL 14 6   HEMATOCRIT % 45 1   PLATELETS Thousands/uL 186   NEUTROS PCT % 76*   LYMPHS PCT % 16   MONOS PCT % 7   EOS PCT % 1     Results from last 7 days   Lab Units 05/11/23  0923   SODIUM mmol/L 139   POTASSIUM mmol/L 4 4   CHLORIDE mmol/L 105   CO2 mmol/L 24   BUN mg/dL 25   CREATININE mg/dL 1 23   ANION GAP mmol/L 10   CALCIUM mg/dL 9 2   ALBUMIN g/dL 4 2   TOTAL BILIRUBIN mg/dL 0 64   ALK PHOS U/L 40   ALT U/L 45   AST U/L 37   GLUCOSE RANDOM mg/dL 163*     Results from last 7 days   Lab Units 05/11/23  0923   INR  1 10                   Lines/Drains:  Invasive Devices     Peripheral Intravenous Line  Duration           Peripheral IV 05/11/23 Left Antecubital <1 day              Imaging: Reviewed radiology reports from this admission including: abdominal/pelvic CT  CT abdomen pelvis with contrast   Final Result by Yonatan Mendosa MD (05/11 1128)      No evidence of acute abdominopelvic process  Colonic diverticulosis  Additional chronic findings and negatives as above  Workstation performed: [id number]         XR chest 1 view portable    (Results Pending)       EKG and Other Studies Reviewed on Admission:   · EKG: NSR  HR 60s  ** Please Note: This note has been constructed using a voice recognition system   **

## 2023-05-11 NOTE — ASSESSMENT & PLAN NOTE
· Poorly controlled BPs secondary to inability to tolerate PO intake and take home medications  · Patient now able to tolerate PO  Restart home medications with the exception of HCTZ for now  · Add PRN IV hydralazine

## 2023-05-11 NOTE — ASSESSMENT & PLAN NOTE
Lab Results   Component Value Date    HGBA1C 7 6 (H) 03/09/2023     Blood Sugar Average: Last 72 hrs:  · Patient reports taking Metformin, glimepiride, jardiance and Lantus 22 units daily at home  · Hold oral agents during hospitalization  Resume at discharge  · Continue Lantus but order at 11 units only given poor PO intake  Resume home regimen when tolerating full PO intake  · Accu-checks and SSI  · ADA diet

## 2023-05-11 NOTE — ASSESSMENT & PLAN NOTE
Lab Results   Component Value Date    EGFR 55 05/11/2023    EGFR 44 03/09/2023    EGFR 44 01/12/2023    CREATININE 1 23 05/11/2023    CREATININE 1 46 (H) 03/09/2023    CREATININE 1 48 (H) 01/12/2023     · Baseline creatinine: 1 4  · Current creatinine below baseline  Continue to monitor closely  · Blood pressures are poorly controlled on admission  Likely secondary to patient being unable to keep down his medications over the past few days  · Home regimen includes: Amlodipine 10 mg daily, clonidine 0 1 mg BID, losartan 100 mg daily, metoprolol succinate 50 mg daily and HCTZ 12 5 mg daily  Will restart home medications with the exception of HCTZ for now  Patient was able to tolerate water in the ED  Resume HCTZ at discharge

## 2023-05-11 NOTE — ED PROVIDER NOTES
History  Chief Complaint   Patient presents with   • Nausea     Pt c/o minimal abdominal pain with nausea, vomiting and diarrhea for 2 days  Pt stated that family has concerns that he may have food poisoning due to not throwing out old food     [de-identified] y/o male, h/o CHF/CAD/IDDM/MI/CABG/ProstateCA on thinners, presenting today for evaluation of nausea vomiting and diarrhea over the past 2 days accompanied with diffuse abdominal discomfort and distention  Patient attempted to have a small bite of food this morning and subsequently had forceful vomiting, now vomiting up bile  Past history of cholecystectomy and AAA repair  Patient has been unable to take his medications over the past 2 days due to the nausea and vomiting  Continues with large amounts of nausea  Notes minimal abdominal discomfort however feels bloated  He is unsure if he had food poisoning, relays that he ate questionable corn beef  Took Pepto-Bismol yesterday and noted to have darker stool was somewhat formed yesterday as well  No bright red blood in the stool, no hematemesis  Currently increased stress as his wife has been placed on hospice and is waiting a bed placement today  Denies fevers, cough, congestion, shortness of breath, sick contacts in the home, chest pain, calf pain or swelling  Differential includes but is not limited to ACS, gastroenteritis, viral illness, bowel obstruction, colitis, food poisoning  Patient has seen cardiology Dr Garry Roche before in the past  Had a cardiac cath on 10/19/22 and ECHO on 7/26/22 with EF of 55%    Cath reading:     Prox LAD to Mid LAD lesion is 100% stenosed  [address] to LAD is widely patent  Prox RCA lesion is 100% stenosed  RCA graft is 100% occlude? Mid Cx to Dist Cx lesion is 40% stenosed  Ost Cxto Prox Cx lesion is 40% stenosed  1st Mrg lesion is 99% stenosed  Which appeared to be a bypassed vessel and bypassed seems to be 100% occluded could not visualized    Origin to Prox Graft lesion is 100% stenosed  RCA graft is 100% occluded  Distal circ stent is widely patent  Patient has grade 1 collaterals from left circulation to distal right coronary artery  Patient has severe three-vessel coronary artery disease with totally occluded 100% RCA, mid LAD and a medium size obtuse marginal  Patent stent seen in distal circumflex  Nonobstructive disease noted of left main and circumflex coronary artery  Vein graft to RCA is 100% occluded  And LIMA to LAD is widely patent  Prior to Admission Medications   Prescriptions Last Dose Informant Patient Reported? Taking? Blood Glucose Monitoring Suppl (OneTouch Verio Reflect) w/Device KIT   No No   Sig: Check blood sugars twice daily  Please substitute with appropriate alternative as covered by patient's insurance  Dx: E11 65   Insulin Glargine Solostar (Lantus SoloStar) 100 UNIT/ML SOPN Past Week  No Yes   Sig: Inject 0 22 mL (22 Units total) under the skin every evening   Insulin Pen Needle 32G X 4 MM MISC   No No   Sig: Use every evening   Jardiance 25 MG TABS Past Week  No Yes   Sig: TAKE 1 TABLET BY MOUTH IN  THE MORNING   Multiple Vitamins-Minerals (MULTIVITAMIN MEN 50+ PO) Past Week  Yes Yes   Sig: Take by mouth daily   Omega-3 Fatty Acids (fish oil) 1,000 mg Past Week  Yes Yes   Sig: Take 4,000 mg by mouth daily    OneTouch Delica Lancets 70B MISC   No No   Sig: Check blood sugars twice daily  Please substitute with appropriate alternative as covered by patient's insurance   Dx: E11 63   amLODIPine (NORVASC) 10 mg tablet Past Week  No Yes   Sig: TAKE 1 TABLET BY MOUTH  DAILY   aspirin 81 mg chewable tablet Past Week  Yes Yes   Sig: Chew 81 mg daily   atorvastatin (LIPITOR) 40 mg tablet Past Week  No Yes   Sig: TAKE 1 TABLET BY MOUTH  DAILY   cadexomer iodine (IODOSORB) 0 9 % gel Past Week  No Yes   Sig: Apply 1 application topically daily as needed for wound care   cloNIDine (Catapres) 0 1 mg tablet Past Week  No Yes   Sig: Take 1 tablet (0 1 mg total) by mouth every 12 (twelve) hours   Patient taking differently: Take 0 1 mg by mouth every 12 (twelve) hours   clopidogrel (PLAVIX) 75 mg tablet Past Week  Yes Yes   Sig: Take 75 mg by mouth daily   fenofibrate 160 MG tablet Past Week  No Yes   Sig: TAKE 1 TABLET BY MOUTH  DAILY   gabapentin (NEURONTIN) 600 MG tablet Past Week  No Yes   Sig: TAKE 1 TABLET BY MOUTH  TWICE DAILY   glimepiride (AMARYL) 2 mg tablet Past Week  No Yes   Sig: TAKE 1 TABLET BY MOUTH  TWICE DAILY   glucose blood (OneTouch Verio) test strip   No No   Sig: Check blood sugars twice daily  Please substitute with appropriate alternative as covered by patient's insurance   Dx: E11 63   hydrochlorothiazide (HYDRODIURIL) 12 5 mg tablet Past Week  No Yes   Sig: TAKE 1 TABLET BY MOUTH DAILY   isosorbide mononitrate (IMDUR) 60 mg 24 hr tablet Past Week  No Yes   Sig: Take 1 tablet (60 mg total) by mouth daily   losartan (COZAAR) 100 MG tablet Past Week  No Yes   Sig: TAKE 1 TABLET BY MOUTH  DAILY   metFORMIN (GLUCOPHAGE) 500 mg tablet Past Week  No Yes   Sig: TAKE 1 TABLET BY MOUTH  TWICE DAILY WITH MEALS   metoprolol succinate (TOPROL-XL) 50 mg 24 hr tablet Past Week  No Yes   Sig: TAKE 1 TABLET BY MOUTH  DAILY   nitroglycerin (NITROSTAT) 0 4 mg SL tablet Unknown  No No   Sig: Place 1 tablet (0 4 mg total) under the tongue every 5 (five) minutes as needed for chest pain   ranolazine (RANEXA) 500 mg 12 hr tablet Past Week  No Yes   Sig: Take 1 tablet (500 mg total) by mouth 2 (two) times a day   sitaGLIPtin (JANUVIA) 100 mg tablet Past Week  Yes Yes   Sig: Take 100 mg by mouth daily      Facility-Administered Medications: None       Past Medical History:   Diagnosis Date   • CAD (coronary artery disease)    • Cancer (HCC)     prostate   • CHF (congestive heart failure) (HCC)    • Diabetes mellitus (HCC)    • Myocardial infarction [facility])        Past Surgical History:   Procedure Laterality Date   • ADENOIDECTOMY     • CARDIAC CATHETERIZATION Left 10/19/2022 Procedure: Cardiac Left Heart Cath;  Surgeon: Martir Longo MD;  Location: [facility]; Service: Cardiology   • CARDIAC SURGERY  2002    3 cardiac bypass then angioplasty 2020   • CHOLECYSTECTOMY     • CORONARY ARTERY BYPASS GRAFT     • PROSTATE SURGERY     • TONSILLECTOMY         Family History   Problem Relation Age of Onset   • Diabetes Mother    • Alcohol abuse Father    • Mental illness Neg Hx      I have reviewed and agree with the history as documented  E-Cigarette/Vaping   • E-Cigarette Use Never User      E-Cigarette/Vaping Substances   • Nicotine No    • THC No    • CBD No    • Flavoring No    • Other No    • Unknown No      Social History     Tobacco Use   • Smoking status: Former     Packs/day: 1 50     Years: 33 00     Pack years: 49 50     Types: Cigarettes     Start date:      Quit date:      Years since quittin 3   • Smokeless tobacco: Never   Vaping Use   • Vaping Use: Never used   Substance Use Topics   • Alcohol use: Yes     Alcohol/week: 14 0 standard drinks     Types: 14 Cans of beer per week     Comment: 1 -2 daily   • Drug use: Never       Review of Systems   Constitutional: Negative  Negative for chills, fatigue and fever  HENT: Negative  Negative for congestion, postnasal drip, rhinorrhea and sore throat  Eyes: Negative  Respiratory: Negative  Negative for cough, shortness of breath and wheezing  Cardiovascular: Negative  Gastrointestinal: Positive for abdominal pain, diarrhea, nausea and vomiting  Negative for abdominal distention, anal bleeding, blood in stool, constipation and rectal pain  Endocrine: Negative  Genitourinary: Negative  Musculoskeletal: Negative  Skin: Negative  Neurological: Negative  Hematological: Negative  Psychiatric/Behavioral: Negative  All other systems reviewed and are negative  Physical Exam  Physical Exam  Vitals and nursing note reviewed     Constitutional:       General: He is not in acute distress  Appearance: He is well-developed  He is ill-appearing  He is not diaphoretic  HENT:      Head: Normocephalic and atraumatic  Right Ear: External ear normal       Left Ear: External ear normal       Nose: Nose normal       Mouth/Throat:      Pharynx: No oropharyngeal exudate  Eyes:      General: No scleral icterus  Right eye: No discharge  Left eye: No discharge  Conjunctiva/sclera: Conjunctivae normal       Pupils: Pupils are equal, round, and reactive to light  Cardiovascular:      Rate and Rhythm: Normal rate and regular rhythm  Pulses: Normal pulses  Heart sounds: Normal heart sounds  No murmur heard  No friction rub  No gallop  Pulmonary:      Effort: Pulmonary effort is normal  No respiratory distress  Breath sounds: Normal breath sounds  No stridor  No wheezing, rhonchi or rales  Chest:      Chest wall: No tenderness  Abdominal:      General: There is no distension  Palpations: Abdomen is soft  There is no mass  Tenderness: There is no abdominal tenderness  There is no guarding or rebound  Hernia: No hernia is present  Musculoskeletal:      Cervical back: Normal range of motion and neck supple  Lymphadenopathy:      Cervical: No cervical adenopathy  Skin:     General: Skin is warm and dry  Capillary Refill: Capillary refill takes less than 2 seconds  Coloration: Skin is not pale  Findings: No erythema or rash  Neurological:      General: No focal deficit present  Mental Status: He is alert and oriented to person, place, and time  Mental status is at baseline           Vital Signs  ED Triage Vitals   Temperature Pulse Respirations Blood Pressure SpO2   05/11/23 0846 05/11/23 0846 05/11/23 0846 05/11/23 0843 05/11/23 0846   97 6 °F (36 4 °C) 60 20 (!) 192/81 99 %      Temp Source Heart Rate Source Patient Position - Orthostatic VS BP Location FiO2 (%)   05/11/23 0846 05/11/23 0846 05/11/23 6174 05/11/23 0846 --   Oral Monitor Lying Left arm       Pain Score       05/11/23 0846       1           Vitals:    05/11/23 1034 05/11/23 1049 05/11/23 1119 05/11/23 1121   BP: (!) 172/74 (!) 197/85 (!) 171/84 (!) 171/84   Pulse: 58 60 66 60   Patient Position - Orthostatic VS:    Lying         Visual Acuity      ED Medications  Medications   sodium chloride 0 9 % bolus 1,000 mL (0 mL Intravenous Stopped 5/11/23 0951)   ondansetron (ZOFRAN) injection 4 mg (4 mg Intravenous Given 5/11/23 0929)   iohexol (OMNIPAQUE) 350 MG/ML injection (SINGLE-DOSE) 100 mL (100 mL Intravenous Given 5/11/23 1023)       Diagnostic Studies  Results Reviewed     Procedure Component Value Units Date/Time    UA w Reflex to Microscopic w Reflex to Culture [[id number]] Collected: 05/11/23 1206    Lab Status: No result Specimen: Urine, Clean Catch     HS Troponin I 2hr [[id number]]  (Abnormal) Collected: 05/11/23 1117    Lab Status: Final result Specimen: Blood from Arm, Left Updated: 05/11/23 1151     hs TnI 2hr 101 ng/L      Delta 2hr hsTnI -33 ng/L     HS Troponin I 4hr [[id number]]     Lab Status: No result Specimen: Blood     Protime-INR [[id number]]  (Normal) Collected: 05/11/23 0923    Lab Status: Final result Specimen: Blood from Arm, Left Updated: 05/11/23 1046     Protime 14 3 seconds      INR 1 10    APTT [[id number]]  (Normal) Collected: 05/11/23 0923    Lab Status: Final result Specimen: Blood from Arm, Left Updated: 05/11/23 1046     PTT 28 seconds     Comprehensive metabolic panel [[id number]]  (Abnormal) Collected: 05/11/23 0923    Lab Status: Final result Specimen: Blood from Arm, Left Updated: 05/11/23 1006     Sodium 139 mmol/L      Potassium 4 4 mmol/L      Chloride 105 mmol/L      CO2 24 mmol/L      ANION GAP 10 mmol/L      BUN 25 mg/dL      Creatinine 1 23 mg/dL      Glucose 163 mg/dL      Calcium 9 2 mg/dL      AST 37 U/L      ALT 45 U/L      Alkaline Phosphatase 40 U/L      Total Protein 8 1 g/dL      Albumin 4 2 g/dL Total Bilirubin 0 64 mg/dL      eGFR 55 ml/min/1 73sq m     Narrative:      National Kidney Disease Foundation guidelines for Chronic Kidney Disease (CKD):   •  Stage 1 with normal or high GFR (GFR > 90 mL/min/1 73 square meters)  •  Stage 2 Mild CKD (GFR = 60-89 mL/min/1 73 square meters)  •  Stage 3A Moderate CKD (GFR = 45-59 mL/min/1 73 square meters)  •  Stage 3B Moderate CKD (GFR = 30-44 mL/min/1 73 square meters)  •  Stage 4 Severe CKD (GFR = 15-29 mL/min/1 73 square meters)  •  Stage 5 End Stage CKD (GFR <15 mL/min/1 73 square meters)  Note: GFR calculation is accurate only with a steady state creatinine    Lipase [[id number]]  (Normal) Collected: 05/11/23 0923    Lab Status: Final result Specimen: Blood from Arm, Left Updated: 05/11/23 1006     Lipase 31 u/L     Magnesium [[id number]]  (Normal) Collected: 05/11/23 0923    Lab Status: Final result Specimen: Blood from Arm, Left Updated: 05/11/23 1006     Magnesium 1 9 mg/dL     HS Troponin 0hr (reflex protocol) [[id number]]  (Abnormal) Collected: 05/11/23 0923    Lab Status: Final result Specimen: Blood from Arm, Left Updated: 05/11/23 1001     hs TnI 0hr 134 ng/L     CBC and differential [[id number]]  (Abnormal) Collected: 05/11/23 0923    Lab Status: Final result Specimen: Blood from Arm, Left Updated: 05/11/23 0934     WBC 5 58 Thousand/uL      RBC 5 23 Million/uL      Hemoglobin 14 6 g/dL      Hematocrit 45 1 %      MCV 86 fL      MCH 27 9 pg      MCHC 32 4 g/dL      RDW 14 4 %      MPV 10 8 fL      Platelets 678 Thousands/uL      nRBC 0 /100 WBCs      Neutrophils Relative 76 %      Immat GRANS % 0 %      Lymphocytes Relative 16 %      Monocytes Relative 7 %      Eosinophils Relative 1 %      Basophils Relative 0 %      Neutrophils Absolute 4 27 Thousands/µL      Immature Grans Absolute 0 01 Thousand/uL      Lymphocytes Absolute 0 89 Thousands/µL      Monocytes Absolute 0 36 Thousand/µL      Eosinophils Absolute 0 04 Thousand/µL      Basophils Absolute 0 01 Thousands/µL                  CT abdomen pelvis with contrast   Final Result by Faustina Rogers MD (05/11 1128)      No evidence of acute abdominopelvic process  Colonic diverticulosis  Additional chronic findings and negatives as above  Workstation performed: [id number]         XR chest 1 view portable    (Results Pending)              Procedures  Procedures         ED Course  ED Course as of 05/11/23 1207   Thu May 11, 2023   1009 hs TnI 0hr(!): 134  No EKG changes  No CP or SOB  Patient diabetic with N/V/D     80 Messaged cardiology Dr Stan Lamas regarding patient's history, presentation and elevated troponin  1023 Discussed with Dr Stan Lamas, patient's case seems largely GI related  No EKG changes  Will monitor troponin for changes  HEART Risk Score    Flowsheet Row Most Recent Value   Heart Score Risk Calculator    History 0 Filed at: 05/11/2023 1154   ECG 0 Filed at: 05/11/2023 1154   Age 2 Filed at: 05/11/2023 1154   Risk Factors 2 Filed at: 05/11/2023 1154   Troponin 2 Filed at: 05/11/2023 1154   HEART Score 6 Filed at: 05/11/2023 1154                        SBIRT 22yo+    Flowsheet Row Most Recent Value   Initial Alcohol Screen: US AUDIT-C     1  How often do you have a drink containing alcohol? 0 Filed at: 05/11/2023 0850   2  How many drinks containing alcohol do you have on a typical day you are drinking? 0 Filed at: 05/11/2023 0850   3a  Male UNDER 65: How often do you have five or more drinks on one occasion? 0 Filed at: 05/11/2023 0850   3b  FEMALE Any Age, or MALE 65+: How often do you have 4 or more drinks on one occassion? 0 Filed at: 05/11/2023 0850   Audit-C Score 0 Filed at: 05/11/2023 2106   [place]: How many times in the past year have you    Used an illegal drug or used a prescription medication for non-medical reasons?  Never Filed at: 05/11/2023 112 [place] lab work and imaging studies with the patient including the elevated troponin at 134 patient's heart score is 6  Will consider element of heart failure given chest x-ray, patient has had very minimal cough very minimal shortness of breath he does have clear breath sounds in all lung fields without any pedal edema  However heart enlargement is apparent on chest x-ray  Please review prior cardiac cath performed in October  Patient is very agreeable for observation at this point in time  Patient feeling significantly better with hydration  Diarrhea: acute illness or injury  Diverticulosis: chronic illness or injury  Elevated troponin: acute illness or injury     Details: repeat delta was -33  Discussed elevated troponin, patient's history, presentation and patient's EKG with cardiology Dr Pete Jimenez, it is believed patient's symptoms are likely more GI related  Hyperglycemia: acute illness or injury  Nausea & vomiting: acute illness or injury  Amount and/or Complexity of Data Reviewed  Labs: ordered  Decision-making details documented in ED Course  Radiology: ordered  Risk  Prescription drug management  Decision regarding hospitalization            Disposition  Final diagnoses:   Elevated troponin   Nausea & vomiting   Diarrhea   Diverticulosis   Hyperglycemia     Time reflects when diagnosis was documented in both MDM as applicable and the Disposition within this note     Time User Action Codes Description Comment    5/11/2023 12:02 PM Gadiel Corona Add [R77 8] Elevated troponin     5/11/2023 12:02 PM Gadiel Corona Add [R11 2] Nausea & vomiting     5/11/2023 12:02 PM Gadiel Corona Add [R19 7] Diarrhea     5/11/2023 12:02 PM Gadiel Corona Add [K57 90] Diverticulosis     5/11/2023 12:04 PM [place] Corona Add [R73 9] Hyperglycemia       ED Disposition     ED Disposition   Admit    Condition   Stable    Date/Time   u May 11, 2023 12:02 PM    Comment   Case was discussed with Dr Debbie Umaña and the patient's admission status was agreed to be Admission Status: observation status to the service of Dr Radha Kinney   Follow-up Information    None         Patient's Medications   Discharge Prescriptions    No medications on file       No discharge procedures on file      PDMP Review       Value Time User    PDMP Reviewed  Yes 2/17/2022  4:22  [facility],           ED Provider  Electronically Signed by           Kim Ha PA-C  05/11/23 9747

## 2023-05-12 VITALS
RESPIRATION RATE: 18 BRPM | HEART RATE: 45 BPM | BODY MASS INDEX: 30.9 KG/M2 | TEMPERATURE: 97.9 F | SYSTOLIC BLOOD PRESSURE: 130 MMHG | OXYGEN SATURATION: 98 % | HEIGHT: 74 IN | DIASTOLIC BLOOD PRESSURE: 59 MMHG | WEIGHT: 240.8 LBS

## 2023-05-12 PROBLEM — R11.2 INTRACTABLE NAUSEA AND VOMITING: Status: RESOLVED | Noted: 2023-05-11 | Resolved: 2023-05-12

## 2023-05-12 LAB
ATRIAL RATE: 58 BPM
GLUCOSE SERPL-MCNC: 118 MG/DL (ref 65–140)
GLUCOSE SERPL-MCNC: 157 MG/DL (ref 65–140)
P AXIS: 58 DEGREES
PR INTERVAL: 224 MS
QRS AXIS: -60 DEGREES
QRSD INTERVAL: 122 MS
QT INTERVAL: 458 MS
QTC INTERVAL: 449 MS
T WAVE AXIS: 24 DEGREES
VENTRICULAR RATE: 58 BPM

## 2023-05-12 RX ADMIN — ISOSORBIDE MONONITRATE 60 MG: 60 TABLET, EXTENDED RELEASE ORAL at 09:07

## 2023-05-12 RX ADMIN — ASPIRIN 81 MG CHEWABLE TABLET 81 MG: 81 TABLET CHEWABLE at 09:09

## 2023-05-12 RX ADMIN — HEPARIN SODIUM 5000 UNITS: 5000 INJECTION INTRAVENOUS; SUBCUTANEOUS at 05:16

## 2023-05-12 RX ADMIN — OMEGA-3 FATTY ACIDS CAP 1000 MG 4000 MG: 1000 CAP at 09:08

## 2023-05-12 RX ADMIN — CLOPIDOGREL BISULFATE 75 MG: 75 TABLET ORAL at 09:08

## 2023-05-12 RX ADMIN — INSULIN LISPRO 1 UNITS: 100 INJECTION, SOLUTION INTRAVENOUS; SUBCUTANEOUS at 12:07

## 2023-05-12 RX ADMIN — CLONIDINE HYDROCHLORIDE 0.1 MG: 0.1 TABLET ORAL at 04:45

## 2023-05-12 RX ADMIN — CLONIDINE HYDROCHLORIDE 0.1 MG: 0.1 TABLET ORAL at 12:06

## 2023-05-12 RX ADMIN — LOSARTAN POTASSIUM 100 MG: 50 TABLET, FILM COATED ORAL at 09:08

## 2023-05-12 RX ADMIN — FENOFIBRATE 168 MG: 48 TABLET, FILM COATED ORAL at 09:08

## 2023-05-12 RX ADMIN — RANOLAZINE 500 MG: 500 TABLET, EXTENDED RELEASE ORAL at 09:09

## 2023-05-12 RX ADMIN — GABAPENTIN 600 MG: 300 CAPSULE ORAL at 09:09

## 2023-05-12 RX ADMIN — PANTOPRAZOLE SODIUM 40 MG: 40 INJECTION, POWDER, FOR SOLUTION INTRAVENOUS at 09:08

## 2023-05-12 RX ADMIN — AMLODIPINE BESYLATE 10 MG: 10 TABLET ORAL at 09:09

## 2023-05-12 NOTE — ASSESSMENT & PLAN NOTE
· Patient admitted with elevated troponins, which are down-trending  Suspect elevation is secondary to hypertensive urgency secondary to inability to take medications as directed along with mild dehydration    · EKG is stable  · Telemetry NSR  · No chest pain

## 2023-05-12 NOTE — PLAN OF CARE
Problem: PAIN - ADULT  Goal: Verbalizes/displays adequate comfort level or baseline comfort level  Description: Interventions:  - Encourage patient to monitor pain and request assistance  - Assess pain using appropriate pain scale  - Administer analgesics based on type and severity of pain and evaluate response  - Implement non-pharmacological measures as appropriate and evaluate response  - Consider cultural and social influences on pain and pain management  - Notify physician/advanced practitioner if interventions unsuccessful or patient reports new pain  Outcome: Adequate for Discharge     Problem: INFECTION - ADULT  Goal: Absence or prevention of progression during hospitalization  Description: INTERVENTIONS:  - Assess and monitor for signs and symptoms of infection  - Monitor lab/diagnostic results  - Monitor all insertion sites, i e  indwelling lines, tubes, and drains  - Monitor endotracheal if appropriate and nasal secretions for changes in amount and color  - [place] appropriate cooling/warming therapies per order  - Administer medications as ordered  - Instruct and encourage patient and family to use good hand hygiene technique  - Identify and instruct in appropriate isolation precautions for identified infection/condition  Outcome: Adequate for Discharge  Goal: Absence of fever/infection during neutropenic period  Description: INTERVENTIONS:  - Monitor WBC    Outcome: Adequate for Discharge     Problem: SAFETY ADULT  Goal: Patient will remain free of falls  Description: INTERVENTIONS:  - Educate patient/family on patient safety including physical limitations  - Instruct patient to call for assistance with activity   - Consult OT/PT to assist with strengthening/mobility   - Keep Call bell within reach  - Keep bed low and locked with side rails adjusted as appropriate  - Keep care items and personal belongings within reach  - Initiate and maintain comfort rounds  - Make Fall Risk Sign visible to staff  - Offer Toileting every 2 Hours, in advance of need  - Initiate/Maintain bed alarm  - Obtain necessary fall risk management equipment: slipper socks  - Apply yellow socks and bracelet for high fall risk patients  - Consider moving patient to room near nurses station  Outcome: Adequate for Discharge  Goal: Maintain or return to baseline ADL function  Description: INTERVENTIONS:  -  Assess patient's ability to carry out ADLs; assess patient's baseline for ADL function and identify physical deficits which impact ability to perform ADLs (bathing, care of mouth/teeth, toileting, grooming, dressing, etc )  - Assess/evaluate cause of self-care deficits   - Assess range of motion  - Assess patient's mobility; develop plan if impaired  - Assess patient's need for assistive devices and provide as appropriate  - Encourage maximum independence but intervene and supervise when necessary  - Involve family in performance of ADLs  - Assess for home care needs following discharge   - Consider OT consult to assist with ADL evaluation and planning for discharge  - Provide patient education as appropriate  Outcome: Adequate for Discharge  Goal: Maintains/Returns to pre admission functional level  Description: INTERVENTIONS:  - Perform BMAT or MOVE assessment daily    - Set and communicate daily mobility goal to care team and patient/family/caregiver  - Collaborate with rehabilitation services on mobility goals if consulted  - Perform Range of Motion 3 times a day  - Reposition patient every 2 hours    - Dangle patient 3 times a day  - Stand patient 3 times a day  - Ambulate patient 3 times a day  - Out of bed to chair 3 times a day   - Out of bed for meals 3 times a day  - Out of bed for toileting  - Record patient progress and toleration of activity level   Outcome: Adequate for Discharge     Problem: DISCHARGE PLANNING  Goal: Discharge to home or other facility with appropriate resources  Description: INTERVENTIONS:  - Identify barriers to discharge w/patient and caregiver  - Arrange for needed discharge resources and transportation as appropriate  - Identify discharge learning needs (meds, wound care, etc )  - Arrange for interpretive services to assist at discharge as needed  - Refer to Case Management Department for coordinating discharge planning if the patient needs post-hospital services based on physician/advanced practitioner order or complex needs related to functional status, cognitive ability, or social support system  Outcome: Adequate for Discharge     Problem: Knowledge Deficit  Goal: Patient/family/caregiver demonstrates understanding of disease process, treatment plan, medications, and discharge instructions  Description: Complete learning assessment and assess knowledge base    Interventions:  - Provide teaching at level of understanding  - Provide teaching via preferred learning methods  Outcome: Adequate for Discharge

## 2023-05-12 NOTE — ASSESSMENT & PLAN NOTE
· Poorly controlled BPs secondary to inability to tolerate PO intake and take home medications    · Patient now able to tolerate PO  · Resume home BP medications  · BP much improved

## 2023-05-12 NOTE — ASSESSMENT & PLAN NOTE
· CT abdomen/pelvis:  No evidence of acute process  Colonic diverticulosis  · Suspect acute viral gastroenteritis    · Improved s/p PPI, IVF, anti-emetics  · Tolerating regular diet

## 2023-05-12 NOTE — DISCHARGE SUMMARY
[address]lene [address]  Discharge- Flavio Better 1945, [de-identified] y o  male MRN: [id number]  Unit/Bed#: [address] 407[address]01 Encounter: [id number]  Primary Care Provider: Sukh Murillo DO   Date and time admitted to hospital: 5/11/2023  8:39 AM    * Intractable nausea and vomiting-resolved as of 5/12/2023  Assessment & Plan  · CT abdomen/pelvis:  No evidence of acute process  Colonic diverticulosis  · Suspect acute viral gastroenteritis  · Improved s/p PPI, IVF, anti-emetics  · Tolerating regular diet    Elevated troponin level not due myocardial infarction  Assessment & Plan  · Patient admitted with elevated troponins, which are down-trending  Suspect elevation is secondary to hypertensive urgency secondary to inability to take medications as directed along with mild dehydration  · EKG is stable  · Telemetry NSR  · No chest pain    Hypertensive urgency  Assessment & Plan  · Poorly controlled BPs secondary to inability to tolerate PO intake and take home medications  · Patient now able to tolerate PO  · Resume home BP medications  · BP much improved    Type 2 diabetes mellitus with diabetic polyneuropathy, without long-term current use of insulin ([facility])  Assessment & Plan  Lab Results   Component Value Date    HGBA1C 7 6 (H) 03/09/2023     Blood Sugar Average: Last 72 hrs:  · (P) 132 25   · Patient reports taking Metformin, glimepiride, jardiance and Lantus 22 units daily at home  · Resume home regimen on discharge  · Diabetic diet    Benign hypertension with CKD (chronic kidney disease) stage III [facility])  Assessment & Plan  Lab Results   Component Value Date    EGFR 55 05/11/2023    EGFR 44 03/09/2023    EGFR 44 01/12/2023    CREATININE 1 23 05/11/2023    CREATININE 1 46 (H) 03/09/2023    CREATININE 1 48 (H) 01/12/2023     · Baseline creatinine: 1 4  · Current creatinine below baseline  · Blood pressures are poorly controlled on admission    Likely secondary to patient being unable to keep down his medications over the past few days - improved today  · Home regimen includes: Amlodipine 10 mg daily, clonidine 0 1 mg BID, losartan 100 mg daily, metoprolol succinate 50 mg daily and HCTZ 12 5 mg daily  · Resume home medications on discharge      Medical Problems     Resolved Problems  Date Reviewed: 5/12/2023          Resolved    * (Principal) Intractable nausea and vomiting 5/12/2023     Resolved by  Saritha Rubio PA-C        Discharging Physician / Practitioner: Saritha Rubio PA-C  PCP: Glenn Harris DO  Admission Date:   Admission Orders (From admission, onward)     Ordered        05/11/23 1203  Place in Observation  Once                      Discharge Date: 05/12/23    Consultations During Hospital Stay:  · none    Procedures Performed:   · none    Significant Findings / Test Results:   · CT a/p: No evidence of acute abdominopelvic process  Colonic diverticulosis  · CXR: No acute cardiopulmonary disease  Incidental Findings:   · none    Test Results Pending at Discharge (will require follow up):   · none     Outpatient Tests Requested:  · none    Complications:  none     Reason for Admission: intractable nausea and vomiting    Hospital Course:   Mirna Jose is a [de-identified] y o  male patient who originally presented to the hospital on 5/11/2023 due to intractable nausea, vomiting, and diarrhea  He was unable to tolerate anything po including his daily medications  He was treated with IVF, anti-emetics, and PPI  Suspect etiology was acute viral gastroenteritis  Patient is feeling much better and tolerating regular diet without issues  He will be discharged home  Please see above list of diagnoses and related plan for additional information  Condition at Discharge: good    Discharge Day Visit / Exam:   Subjective: Patient reports feeling much better today  He has had no further nausea, vomiting, or diarrhea  He denies abdominal pain  He ate scrambled eggs, toast, and fruit for breakfast without issue  "He is eager to go home  Vitals: Blood Pressure: 130/59 (05/12/23 0747)  Pulse: (!) 45 (05/12/23 0747)  Temperature: 97 9 °F (36 6 °C) (05/12/23 0747)  Temp Source: Oral (05/11/23 0846)  Respirations: 18 (05/12/23 0747)  Height: 6' 2\" (188 cm) (05/11/23 0846)  Weight - Scale: 109 kg (240 lb 12 8 oz) (05/11/23 0846)  SpO2: 98 % (05/12/23 0747)  Exam:   Physical Exam  Vitals and nursing note reviewed  Constitutional:       General: He is not in acute distress  Appearance: He is well-developed  Cardiovascular:      Rate and Rhythm: Normal rate and regular rhythm  Pulmonary:      Effort: Pulmonary effort is normal  No respiratory distress  Breath sounds: Normal breath sounds  Abdominal:      General: Bowel sounds are normal  There is no distension  Palpations: Abdomen is soft  Tenderness: There is no abdominal tenderness  Musculoskeletal:         General: No swelling  Skin:     General: Skin is warm and dry  Capillary Refill: Capillary refill takes less than 2 seconds  Neurological:      Mental Status: He is alert  Psychiatric:         Mood and Affect: Mood normal           Discussion with Family: Patient declined call to   Discharge instructions/Information to patient and family:   See after visit summary for information provided to patient and family  Provisions for Follow-Up Care:  See after visit summary for information related to follow-up care and any pertinent home health orders  Disposition:   Home    Planned Readmission: none     Discharge Statement:  I spent <30 minutes discharging the patient  This time was spent on the day of discharge  I had direct contact with the patient on the day of discharge  Greater than 50% of the total time was spent examining patient, answering all patient questions, arranging and discussing plan of care with patient as well as directly providing post-discharge instructions    Additional time then spent on " discharge activities  Discharge Medications:  See after visit summary for reconciled discharge medications provided to patient and/or family        **Please Note: This note may have been constructed using a voice recognition system**

## 2023-05-12 NOTE — NURSING NOTE
AVS reviewed with patient including discharge instructions, follow up appointments and medications  Patient states understanding  All questions answered  Belongings accounted for per patient  Discharged to home without signs of distress

## 2023-05-12 NOTE — ASSESSMENT & PLAN NOTE
Lab Results   Component Value Date    EGFR 55 05/11/2023    EGFR 44 03/09/2023    EGFR 44 01/12/2023    CREATININE 1 23 05/11/2023    CREATININE 1 46 (H) 03/09/2023    CREATININE 1 48 (H) 01/12/2023     · Baseline creatinine: 1 4  · Current creatinine below baseline  · Blood pressures are poorly controlled on admission  Likely secondary to patient being unable to keep down his medications over the past few days - improved today  · Home regimen includes: Amlodipine 10 mg daily, clonidine 0 1 mg BID, losartan 100 mg daily, metoprolol succinate 50 mg daily and HCTZ 12 5 mg daily    · Resume home medications on discharge

## 2023-05-12 NOTE — ASSESSMENT & PLAN NOTE
Lab Results   Component Value Date    HGBA1C 7 6 (H) 03/09/2023     Blood Sugar Average: Last 72 hrs:  · (P) 132 25   · Patient reports taking Metformin, glimepiride, jardiance and Lantus 22 units daily at home    · Resume home regimen on discharge  · Diabetic diet

## 2023-05-12 NOTE — UTILIZATION REVIEW
Initial Clinical Review    Admission: Date/Time/Statement:   Admission Orders (From admission, onward)     Ordered        05/11/23 1203  Place in Observation  Once                      Orders Placed This Encounter   Procedures   • Place in Observation     Standing Status:   Standing     Number of Occurrences:   1     Order Specific Question:   Level of Care     Answer:   Med Surg [16]     ED Arrival Information     Expected   -    Arrival   5/11/2023 08:34    Acuity   Urgent            Means of arrival   Walk-In    Escorted by   Family Member    Service   Hospitalist    Admission type   Emergency            Arrival complaint   abd pain           Chief Complaint   Patient presents with   • Nausea     Pt c/o minimal abdominal pain with nausea, vomiting and diarrhea for 2 days  Pt stated that family has concerns that he may have food poisoning due to not throwing out old food       Initial Presentation:   [de-identified] yom to ER from home c/o nausea vomiting and diarrhea over the past 2 days accompanied with diffuse abdominal discomfort and distention  He is unsure if he had food poisoning, relays that he ate questionable corn beef  Took Pepto-Bismol yesterday and noted to have darker stool was somewhat formed yesterday as well  Hx CHF/CAD/IDDM/MI/CABG/ProstateCA on thinners, cholecystectomy and AAA repair  Presents hypertensive, abd distended, decreased BS  Admission work-up showing elevated troponin   Placed in observation status for intractable N&V       ED Triage Vitals   Temperature Pulse Respirations Blood Pressure SpO2   05/11/23 0846 05/11/23 0846 05/11/23 0846 05/11/23 0843 05/11/23 0846   97 6 °F (36 4 °C) 60 20 (!) 192/81 99 %      Temp Source Heart Rate Source Patient Position - Orthostatic VS BP Location FiO2 (%)   05/11/23 0846 05/11/23 0846 05/11/23 0846 05/11/23 0846 --   Oral Monitor Lying Left arm       Pain Score       05/11/23 0846       1          Wt Readings from Last 1 Encounters:   05/11/23 109 kg (240 lb 12 8 oz)     Additional Vital Signs:   Date/Time Temp Pulse Resp BP MAP (mmHg) SpO2 O2 Device Patient Position - Orthostatic VS   05/12/23 04:45:53 -- 50 Abnormal  -- 134/63 87 97 % -- --   05/11/23 23:04:56 98 2 °F (36 8 °C) 50 Abnormal  -- 131/59 83 96 % -- --   05/11/23 20:46:36 98 1 °F (36 7 °C) 54 Abnormal  -- 135/56 82 95 % -- --   05/11/23 14:56:59 97 8 °F (36 6 °C) 62 16 148/64 92 96 % -- --   05/11/23 14:02:11 97 6 °F (36 4 °C) 60 -- 144/63 90 94 % -- --   05/11/23 1219 -- 64 -- 163/72 104 97 % -- --   05/11/23 1121 -- 60 15 171/84 Abnormal  -- 97 % None (Room air) Lying   05/11/23 1119 -- 66 -- 171/84 Abnormal  121 98 % -- --   05/11/23 1049 -- 60 -- 197/85 Abnormal  122 95 % -- --   05/11/23 1034 -- 58 -- 172/74 Abnormal  106 97 % -- --   05/11/23 0949 -- 56 14 181/75 Abnormal  108 92 % -- --   05/11/23 0934 -- -- -- -- -- -- None (Room air) --   05/11/23 0919 -- 58 19 162/77 110 -- -- --   05/11/23 0846 97 6 °F (36 4 °C) 60 20 192/81 Abnormal  -- 99 % None (Room air) Lying   05/11/23 0843 -- -- -- 192/81 Abnormal  116 -- -- --     Pertinent Labs/Diagnostic Test Results:   XR chest 1 view portable   Final Result  (05/11 1619)      No acute cardiopulmonary disease  CT abdomen pelvis with contrast   Final Result  (05/11 1128)      No evidence of acute abdominopelvic process  Colonic diverticulosis              Results from last 7 days   Lab Units 05/11/23  1538 05/11/23  0923   WBC Thousand/uL  --  5 58   HEMOGLOBIN g/dL  --  14 6   HEMATOCRIT %  --  45 1   PLATELETS Thousands/uL 195 186   NEUTROS ABS Thousands/µL  --  4 27         Results from last 7 days   Lab Units 05/11/23  0923   SODIUM mmol/L 139   POTASSIUM mmol/L 4 4   CHLORIDE mmol/L 105   CO2 mmol/L 24   ANION GAP mmol/L 10   BUN mg/dL 25   CREATININE mg/dL 1 23   EGFR ml/min/1 73sq m 55   CALCIUM mg/dL 9 2   MAGNESIUM mg/dL 1 9     Results from last 7 days   Lab Units 05/11/23  0923   AST U/L 37   ALT U/L 45   ALK PHOS U/L 40   TOTAL PROTEIN g/dL 8 1   ALBUMIN g/dL 4 2   TOTAL BILIRUBIN mg/dL 0 64     Results from last 7 days   Lab Units 05/11/23  2112 05/11/23  1526   POC GLUCOSE mg/dl 151* 103     Results from last 7 days   Lab Units 05/11/23  0923   GLUCOSE RANDOM mg/dL 163*             No results found for: BETA-HYDROXYBUTYRATE                   Results from last 7 days   Lab Units 05/11/23  1538 05/11/23  1117 05/11/23  0923   HS TNI 0HR ng/L  --   --  134*   HS TNI 2HR ng/L  --  101*  --    HSTNI D2 ng/L  --  -33  --    HS TNI 4HR ng/L 113*  --   --    HSTNI D4 ng/L -21  --   --          Results from last 7 days   Lab Units 05/11/23  0923   PROTIME seconds 14 3   INR  1 10   PTT seconds 28                                             Results from last 7 days   Lab Units 05/11/23  0923   LIPASE u/L 31                 Results from last 7 days   Lab Units 05/11/23  1206   CLARITY UA  Clear   COLOR UA  Yellow   SPEC GRAV UA  1 010   PH UA  5 5   GLUCOSE UA mg/dl >=1000 (1%)*   KETONES UA mg/dl Trace*   BLOOD UA  Negative   PROTEIN UA mg/dl Negative   NITRITE UA  Negative   BILIRUBIN UA  Negative   UROBILINOGEN UA E U /dl 0 2   LEUKOCYTES UA  Elevated glucose may cause decreased leukocyte values   See urine microscopic for UWBC result*   WBC UA /hpf None Seen   RBC UA /hpf None Seen   BACTERIA UA /hpf None Seen   EPITHELIAL CELLS WET PREP /hpf Occasional                                               ED Treatment:   Medication Administration from 05/11/2023 0834 to 05/11/2023 1339       Date/Time Order Dose Route Action     05/11/2023 0924 EDT sodium chloride 0 9 % bolus 1,000 mL 1,000 mL Intravenous New Bag     05/11/2023 0929 EDT ondansetron (ZOFRAN) injection 4 mg 4 mg Intravenous Given     05/11/2023 1023 EDT iohexol (OMNIPAQUE) 350 MG/ML injection (SINGLE-DOSE) 100 mL 100 mL Intravenous Given        Past Medical History:   Diagnosis Date   • CAD (coronary artery disease)    • [facility] ([facility] )     prostate   • CHF (congestive heart failure) ([facility] ) • Diabetes mellitus ([facility] )    • Myocardial infarction [facility])      Present on Admission:  • Benign hypertension with CKD (chronic kidney disease) stage III ([facility])  • Type 2 diabetes mellitus with diabetic polyneuropathy, without long-term current use of insulin ([facility])      Admitting Diagnosis: Diarrhea [R19 7]  Diverticulosis [K57 90]  Nausea [R11 0]  Nausea & vomiting [R11 2]  Hyperglycemia [R73 9]  Elevated troponin [R77 8]  Age/Sex: [de-identified] y o  male  Admission Orders:  Cont pulse ox  Telemetry  accuchecks    Scheduled Medications:  amLODIPine, 10 mg, Oral, Daily  aspirin, 81 mg, Oral, Daily  atorvastatin, 40 mg, Oral, Daily With Dinner  cloNIDine, 0 1 mg, Oral, Q12H [address]  clopidogrel, 75 mg, Oral, Daily  fenofibrate, 168 mg, Oral, Daily  fish oil, 4,000 mg, Oral, Daily  gabapentin, 600 mg, Oral, BID  heparin (porcine), 5,000 Units, Subcutaneous, Q8H CONSTANTINE  insulin glargine, 11 Units, Subcutaneous, HS  insulin lispro, 1-5 Units, Subcutaneous, TID AC  insulin lispro, 1-5 Units, Subcutaneous, HS  isosorbide mononitrate, 60 mg, Oral, Daily  losartan, 100 mg, Oral, Daily  metoprolol succinate, 50 mg, Oral, Daily  pantoprazole, 40 mg, Intravenous, Q24H CONSTANTINE  ranolazine, 500 mg, Oral, BID    PRN Meds:  aluminum-magnesium hydroxide-simethicone, 30 mL, Oral, Q6H PRN  hydrALAZINE, 5 mg, Intravenous, Q6H PRN  nitroglycerin, 0 4 mg, Sublingual, Q5 Min PRN  ondansetron, 4 mg, Intravenous, Q6H PRN      Network Utilization Review Department  ATTENTION: Please call with any questions or concerns to [phone] and carefully listen to the prompts so that you are directed to the right person  All voicemails are confidential   Clair Boyce all requests for admission clinical reviews, approved or denied determinations and any other requests to dedicated fax number below belonging to the campus where the patient is receiving treatment   List of dedicated fax numbers for the Facilities:  FACILITY NAME UR FAX NUMBER   ADMISSION DENIALS (Administrative/Medical Necessity) [phone]   [address] (Maternity/NICU/Pediatrics) [facility] [place] [address] [place] Isela Vasquez  [phone]   [facility] [address] [place] [id number] [place] [address] [address] [facility] [place] [address] 815 [facility] [phone]

## 2023-05-22 DIAGNOSIS — E11.42 TYPE 2 DIABETES MELLITUS WITH DIABETIC POLYNEUROPATHY, WITHOUT LONG-TERM CURRENT USE OF INSULIN (HCC): ICD-10-CM

## 2023-05-22 RX ORDER — GLIMEPIRIDE 2 MG/1
2 TABLET ORAL 2 TIMES DAILY
Qty: 180 TABLET | Refills: 1 | Status: SHIPPED | OUTPATIENT
Start: 2023-05-22

## 2023-05-25 RX ORDER — CLOPIDOGREL BISULFATE 75 MG/1
75 TABLET ORAL DAILY
Qty: 90 TABLET | Refills: 0 | OUTPATIENT
Start: 2023-05-25

## 2023-06-01 ENCOUNTER — APPOINTMENT (OUTPATIENT)
Dept: LAB | Facility: CLINIC | Age: 78
End: 2023-06-01
Payer: COMMERCIAL

## 2023-06-01 DIAGNOSIS — I12.9 BENIGN HYPERTENSION WITH CKD (CHRONIC KIDNEY DISEASE) STAGE III (HCC): ICD-10-CM

## 2023-06-01 DIAGNOSIS — N18.32 STAGE 3B CHRONIC KIDNEY DISEASE (HCC): ICD-10-CM

## 2023-06-01 DIAGNOSIS — N18.30 BENIGN HYPERTENSION WITH CKD (CHRONIC KIDNEY DISEASE) STAGE III (HCC): ICD-10-CM

## 2023-06-01 DIAGNOSIS — D69.6 PLATELETS DECREASED (HCC): ICD-10-CM

## 2023-06-01 DIAGNOSIS — N18.31 STAGE 3A CHRONIC KIDNEY DISEASE (HCC): ICD-10-CM

## 2023-06-01 DIAGNOSIS — E78.2 MIXED HYPERLIPIDEMIA: ICD-10-CM

## 2023-06-01 DIAGNOSIS — E11.42 TYPE 2 DIABETES MELLITUS WITH DIABETIC POLYNEUROPATHY, WITHOUT LONG-TERM CURRENT USE OF INSULIN (HCC): ICD-10-CM

## 2023-06-01 LAB
25(OH)D3 SERPL-MCNC: 39.2 NG/ML (ref 30–100)
ALBUMIN SERPL BCP-MCNC: 4.1 G/DL (ref 3.5–5)
ALP SERPL-CCNC: 42 U/L (ref 46–116)
ALT SERPL W P-5'-P-CCNC: 29 U/L (ref 12–78)
ANION GAP SERPL CALCULATED.3IONS-SCNC: -1 MMOL/L (ref 4–13)
AST SERPL W P-5'-P-CCNC: 22 U/L (ref 5–45)
BACTERIA UR QL AUTO: ABNORMAL /HPF
BILIRUB SERPL-MCNC: 0.55 MG/DL (ref 0.2–1)
BILIRUB UR QL STRIP: NEGATIVE
BUN SERPL-MCNC: 38 MG/DL (ref 5–25)
CALCIUM SERPL-MCNC: 9.6 MG/DL (ref 8.3–10.1)
CHLORIDE SERPL-SCNC: 108 MMOL/L (ref 96–108)
CLARITY UR: CLEAR
CO2 SERPL-SCNC: 29 MMOL/L (ref 21–32)
COLOR UR: ABNORMAL
CREAT SERPL-MCNC: 1.63 MG/DL (ref 0.6–1.3)
CREAT UR-MCNC: 55.4 MG/DL
ERYTHROCYTE [DISTWIDTH] IN BLOOD BY AUTOMATED COUNT: 14.5 % (ref 11.6–15.1)
GFR SERPL CREATININE-BSD FRML MDRD: 39 ML/MIN/1.73SQ M
GLUCOSE P FAST SERPL-MCNC: 101 MG/DL (ref 65–99)
GLUCOSE UR STRIP-MCNC: ABNORMAL MG/DL
HCT VFR BLD AUTO: 43.4 % (ref 36.5–49.3)
HGB BLD-MCNC: 13.6 G/DL (ref 12–17)
HGB UR QL STRIP.AUTO: NEGATIVE
KETONES UR STRIP-MCNC: NEGATIVE MG/DL
LEUKOCYTE ESTERASE UR QL STRIP: ABNORMAL
MAGNESIUM SERPL-MCNC: 2.5 MG/DL (ref 1.6–2.6)
MCH RBC QN AUTO: 27.5 PG (ref 26.8–34.3)
MCHC RBC AUTO-ENTMCNC: 31.3 G/DL (ref 31.4–37.4)
MCV RBC AUTO: 88 FL (ref 82–98)
NITRITE UR QL STRIP: NEGATIVE
NON-SQ EPI CELLS URNS QL MICRO: ABNORMAL /HPF
PH UR STRIP.AUTO: 6 [PH]
PHOSPHATE SERPL-MCNC: 3.2 MG/DL (ref 2.3–4.1)
PLATELET # BLD AUTO: 202 THOUSANDS/UL (ref 149–390)
PMV BLD AUTO: 11.7 FL (ref 8.9–12.7)
POTASSIUM SERPL-SCNC: 4.2 MMOL/L (ref 3.5–5.3)
PROT SERPL-MCNC: 8.5 G/DL (ref 6.4–8.4)
PROT UR STRIP-MCNC: ABNORMAL MG/DL
PROT UR-MCNC: 20 MG/DL
PROT/CREAT UR: 0.36 MG/G{CREAT} (ref 0–0.1)
PTH-INTACT SERPL-MCNC: 20.2 PG/ML (ref 12–88)
RBC # BLD AUTO: 4.94 MILLION/UL (ref 3.88–5.62)
RBC #/AREA URNS AUTO: ABNORMAL /HPF
SODIUM SERPL-SCNC: 136 MMOL/L (ref 135–147)
SP GR UR STRIP.AUTO: 1.02 (ref 1–1.03)
UROBILINOGEN UR STRIP-ACNC: <2 MG/DL
WBC # BLD AUTO: 6.7 THOUSAND/UL (ref 4.31–10.16)
WBC #/AREA URNS AUTO: ABNORMAL /HPF

## 2023-06-01 PROCEDURE — 83970 ASSAY OF PARATHORMONE: CPT

## 2023-06-01 PROCEDURE — 84100 ASSAY OF PHOSPHORUS: CPT

## 2023-06-01 PROCEDURE — 36415 COLL VENOUS BLD VENIPUNCTURE: CPT

## 2023-06-01 PROCEDURE — 83735 ASSAY OF MAGNESIUM: CPT

## 2023-06-01 PROCEDURE — 83036 HEMOGLOBIN GLYCOSYLATED A1C: CPT

## 2023-06-01 PROCEDURE — 80053 COMPREHEN METABOLIC PANEL: CPT

## 2023-06-01 PROCEDURE — 85027 COMPLETE CBC AUTOMATED: CPT

## 2023-06-01 PROCEDURE — 82306 VITAMIN D 25 HYDROXY: CPT

## 2023-06-01 PROCEDURE — 82570 ASSAY OF URINE CREATININE: CPT

## 2023-06-01 PROCEDURE — 81001 URINALYSIS AUTO W/SCOPE: CPT

## 2023-06-01 PROCEDURE — 84156 ASSAY OF PROTEIN URINE: CPT

## 2023-06-01 RX ORDER — CLOPIDOGREL BISULFATE 75 MG/1
75 TABLET ORAL DAILY
OUTPATIENT
Start: 2023-06-01

## 2023-06-02 LAB
EST. AVERAGE GLUCOSE BLD GHB EST-MCNC: 134 MG/DL
HBA1C MFR BLD: 6.3 %

## 2023-06-05 ENCOUNTER — OFFICE VISIT (OUTPATIENT)
Dept: NEPHROLOGY | Facility: CLINIC | Age: 78
End: 2023-06-05
Payer: COMMERCIAL

## 2023-06-05 VITALS
HEIGHT: 74 IN | HEART RATE: 56 BPM | SYSTOLIC BLOOD PRESSURE: 126 MMHG | DIASTOLIC BLOOD PRESSURE: 62 MMHG | BODY MASS INDEX: 32.08 KG/M2 | WEIGHT: 250 LBS

## 2023-06-05 DIAGNOSIS — N18.30 BENIGN HYPERTENSION WITH CKD (CHRONIC KIDNEY DISEASE) STAGE III (HCC): ICD-10-CM

## 2023-06-05 DIAGNOSIS — N18.9 CHRONIC KIDNEY DISEASE-MINERAL AND BONE DISORDER: ICD-10-CM

## 2023-06-05 DIAGNOSIS — M89.9 CHRONIC KIDNEY DISEASE-MINERAL AND BONE DISORDER: ICD-10-CM

## 2023-06-05 DIAGNOSIS — N18.32 STAGE 3B CHRONIC KIDNEY DISEASE (HCC): Primary | ICD-10-CM

## 2023-06-05 DIAGNOSIS — E83.9 CHRONIC KIDNEY DISEASE-MINERAL AND BONE DISORDER: ICD-10-CM

## 2023-06-05 DIAGNOSIS — R31.0 GROSS HEMATURIA: ICD-10-CM

## 2023-06-05 DIAGNOSIS — I12.9 BENIGN HYPERTENSION WITH CKD (CHRONIC KIDNEY DISEASE) STAGE III (HCC): ICD-10-CM

## 2023-06-05 PROCEDURE — 99214 OFFICE O/P EST MOD 30 MIN: CPT | Performed by: INTERNAL MEDICINE

## 2023-06-05 NOTE — PATIENT INSTRUCTIONS
You have chronic kidney disease (CKD) and your kidney function is stable  I recommend no changes to your medications today  Check blood work in about 4-5 months  Follow up in 9 months - please obtain blood work 1-2 weeks prior to the appointment  Please check your BP twice daily for 1 week and bring a log with your next visit  Please avoid taking NSAIDs (Ibuprofen, Motrin, Aleve, Advil, Naproxen, Celebrex, etc )  Make sure you are eating healthy and have adequate exercise

## 2023-06-05 NOTE — PROGRESS NOTES
NEPHROLOGY OFFICE PROGRESS NOTE   Aurelio Saleh [de-identified] y o  male MRN: [id number]  DATE: 06/05/23  Reason for visit: Continued evaluation and management of CKD    ASSESSMENT & PLAN:  1  Chronic kidney disease, stage III:  · Thomas's baseline serum creatinine appears to be around 1 3-1 5  · The etiology of his CKD is suspected to be hypertensive nephrosclerosis and diabetic kidney disease  · Creatinine is 1 63 - this is not far from his baseline and could be simply due to inter reading variability  · No changes necessary at this time  · Treatment: good BP control, glycemic control  2  Hypertension:  · BP is at goal in the office (<130/80)  · However, home readings are above goal  Unclear accuracy of home BP cuff  · Current medications: HCTZ 12 5 mg daily, metoprolol succinate 50 mg daily, losartan 100 mg daily, amlodipine 10 mg daily, clonidine 0 1 mg twice a day, ISMN 60 mg daily  · No changes for now  · Since he forgot his BP cuff today, I asked him to have his BP cuff calibrated with his cardiologist or PCP over the next month  We will make adjustments to his regimen if necessary depending on the accuracy of his home BP cuff  3  Gross hematuria  · Resolved  · UA without hematuria  4  Mineral bone disease:  · PTH is at goal   20 2 in June 2023  · Calcium and phosphorus are at goal   · Vitamin D level is at goal   39 2 in June 2023  5  Proteinuria:  · UPC is at goal - 0 36    · Continue Losartan 100 mg OD  6  Diabetes mellitus:  · Management is deferred to PCP  · Glycemic control is much better  · He is on Jardiance  7  Hyperlipidemia:  · On atorvastatin and fenofibrate    Patient Instructions   You have chronic kidney disease (CKD) and your kidney function is stable  I recommend no changes to your medications today  Check blood work in about 4-5 months  Follow up in 9 months - please obtain blood work 1-2 weeks prior to the appointment       Please check your BP twice daily "for 1 week and bring a log with your next visit  Please avoid taking NSAIDs (Ibuprofen, Motrin, Aleve, Advil, Naproxen, Celebrex, etc )  Make sure you are eating healthy and have adequate exercise  SUBJECTIVE / INTERVAL HISTORY:  Shirley Anne was last seen in November 2022  His wife recently passed away due to Alzheimer's disease  He was admitted to [facility] between May 11 and 12, 2023 after presenting with nausea, vomiting, and diarrhea  On presentation, his creatinine was 1 23  His symptoms resolved and he was discharged improved  No new acute issues  His home BP using an arm cuff has been in the 140s to 150s/70s  He forgot to bring his home BP cuff today  PMH/PSH: HTN, DM, HLP, CAD s/p CABG 2002, s/p PCI, prostate cancer s/p prostatectomy, AAA s/p EVAR, nephrolithiasis, DDD s/p laminectomy, cholecystectomy    Previous work up:   8/2/22 CT abdomen: 1 or more simple renal cysts  No hydronephrosis  Parenchymal thinning/scar at the R inferior pole  ALLERGIES:   Allergies   Allergen Reactions   • Lisinopril Rash and Lip Swelling     REVIEW OF SYSTEMS:  Review of Systems   Constitutional: Negative for appetite change, chills, fatigue and fever  Respiratory: Positive for shortness of breath (on exertion  )  Negative for cough  Cardiovascular: Positive for leg swelling  Negative for chest pain  Gastrointestinal: Negative for abdominal pain, diarrhea, nausea and vomiting  Genitourinary: Negative for hematuria  Musculoskeletal: Negative for arthralgias and back pain  Neurological: Negative for dizziness and light-headedness  OBJECTIVE:  /62 (BP Location: Left arm, Patient Position: Sitting, Cuff Size: Standard)   Pulse 56   Ht 6' 2\" (1 88 m)   Wt 113 kg (250 lb)   BMI 32 10 kg/m²   Current Weight: Weight - Scale: 113 kg (250 lb) Body mass index is 32 1 kg/m²  Physical Exam  Constitutional:       General: He is not in acute distress  Appearance: Normal appearance   " He is well-developed  He is obese  He is not ill-appearing or diaphoretic  HENT:      Head: Normocephalic and atraumatic  Eyes:      General: No scleral icterus  Conjunctiva/sclera: Conjunctivae normal    Neck:      Vascular: No JVD  Cardiovascular:      Rate and Rhythm: Normal rate and regular rhythm  Heart sounds: Murmur heard  Pulmonary:      Effort: Pulmonary effort is normal  No respiratory distress  Breath sounds: Normal breath sounds  Abdominal:      General: Bowel sounds are normal       Palpations: Abdomen is soft  Musculoskeletal:      Cervical back: Neck supple  Comments: Trace to mild ankle edema  Skin:     General: Skin is warm and dry  Neurological:      Mental Status: He is alert and oriented to person, place, and time     Psychiatric:         Behavior: Behavior normal        Medications:  Current Outpatient Medications:   •  amLODIPine (NORVASC) 10 mg tablet, TAKE 1 TABLET BY MOUTH  DAILY, Disp: 90 tablet, Rfl: 3  •  aspirin 81 mg chewable tablet, Chew 81 mg daily, Disp: , Rfl:   •  atorvastatin (LIPITOR) 40 mg tablet, TAKE 1 TABLET BY MOUTH  DAILY, Disp: 90 tablet, Rfl: 3  •  cloNIDine (Catapres) 0 1 mg tablet, Take 1 tablet (0 1 mg total) by mouth every 12 (twelve) hours (Patient taking differently: Take 0 1 mg by mouth every 12 (twelve) hours), Disp: 180 tablet, Rfl: 1  •  fenofibrate 160 MG tablet, TAKE 1 TABLET BY MOUTH  DAILY, Disp: 90 tablet, Rfl: 3  •  gabapentin (NEURONTIN) 600 MG tablet, TAKE 1 TABLET BY MOUTH  TWICE DAILY, Disp: 180 tablet, Rfl: 3  •  glimepiride (AMARYL) 2 mg tablet, Take 1 tablet (2 mg total) by mouth 2 (two) times a day, Disp: 180 tablet, Rfl: 1  •  hydrochlorothiazide (HYDRODIURIL) 12 5 mg tablet, TAKE 1 TABLET BY MOUTH DAILY, Disp: 30 tablet, Rfl: 11  •  Insulin Glargine Solostar (Lantus SoloStar) 100 UNIT/ML SOPN, Inject 0 22 mL (22 Units total) under the skin every evening, Disp: 15 mL, Rfl: 3  •  isosorbide mononitrate (IMDUR) 60 mg 24 hr tablet, Take 1 tablet (60 mg total) by mouth daily, Disp: 90 tablet, Rfl: 2  •  Jardiance 25 MG TABS, TAKE 1 TABLET BY MOUTH IN  THE MORNING, Disp: 90 tablet, Rfl: 3  •  losartan (COZAAR) 100 MG tablet, TAKE 1 TABLET BY MOUTH  DAILY, Disp: 90 tablet, Rfl: 3  •  metFORMIN (GLUCOPHAGE) 500 mg tablet, TAKE 1 TABLET BY MOUTH  TWICE DAILY WITH MEALS, Disp: 180 tablet, Rfl: 3  •  metoprolol succinate (TOPROL-XL) 50 mg 24 hr tablet, TAKE 1 TABLET BY MOUTH  DAILY, Disp: 90 tablet, Rfl: 3  •  Multiple Vitamins-Minerals (MULTIVITAMIN MEN 50+ PO), Take by mouth daily, Disp: , Rfl:   •  Omega-3 Fatty Acids (fish oil) 1,000 mg, Take 4,000 mg by mouth daily , Disp: , Rfl:   •  ranolazine (RANEXA) 500 mg 12 hr tablet, Take 1 tablet (500 mg total) by mouth 2 (two) times a day, Disp: 60 tablet, Rfl: 3  •  sitaGLIPtin (JANUVIA) 100 mg tablet, Take 100 mg by mouth daily, Disp: , Rfl:   •  Blood Glucose Monitoring Suppl (OneTouch Verio Reflect) w/Device KIT, Check blood sugars twice daily  Please substitute with appropriate alternative as covered by patient's insurance  Dx: E11 65, Disp: 1 kit, Rfl: 0  •  cadexomer iodine (IODOSORB) 0 9 % gel, Apply 1 application topically daily as needed for wound care, Disp: 10 g, Rfl: 0  •  clopidogrel (PLAVIX) 75 mg tablet, Take 75 mg by mouth daily (Patient not taking: Reported on 6/5/2023), Disp: , Rfl:   •  glucose blood (OneTouch Verio) test strip, Check blood sugars twice daily  Please substitute with appropriate alternative as covered by patient's insurance  Dx: E11 65, Disp: 200 each, Rfl: 3  •  Insulin Pen Needle 32G X 4 MM MISC, Use every evening, Disp: 100 each, Rfl: 5  •  nitroglycerin (NITROSTAT) 0 4 mg SL tablet, Place 1 tablet (0 4 mg total) under the tongue every 5 (five) minutes as needed for chest pain, Disp: 30 tablet, Rfl: 3  •  OneTouch Delica Lancets 41K MISC, Check blood sugars twice daily   Please substitute with appropriate alternative as covered by patient's insurance  Dx: E11 65, Disp: 200 each, Rfl: 3    Laboratory Results:  Results for orders placed or performed in visit on 06/01/23   Magnesium   Result Value Ref Range    Magnesium 2 5 1 6 - 2 6 mg/dL   PTH, intact   Result Value Ref Range    PTH 20 2 12 0 - 88 0 pg/mL   Protein / creatinine ratio, urine   Result Value Ref Range    Creatinine, Ur 55 4 mg/dL    Protein Urine Random 20 mg/dL    Prot/Creat Ratio, Ur 0 36 (H) 0 00 - 0 10   Vitamin D 25 hydroxy   Result Value Ref Range    Vit D, 25-Hydroxy 39 2 30 0 - 100 0 ng/mL   Urinalysis with microscopic   Result Value Ref Range    Color, UA Light Yellow     Clarity, UA Clear     Specific Gravity, UA 1 017 1 003 - 1 030    pH, UA 6 0 4 5, 5 0, 5 5, 6 0, 6 5, 7 0, 7 5, 8 0    Leukocytes, UA (A) Negative     Elevated glucose may cause decreased leukocyte values  See urine microscopic for UWBC result    Nitrite, UA Negative Negative    Protein, UA Trace (A) Negative mg/dl    Glucose, UA >=1000 (1%) (A) Negative mg/dl    Ketones, UA Negative Negative mg/dl    Urobilinogen, UA <2 0 <2 0 mg/dl mg/dl    Bilirubin, UA Negative Negative    Occult Blood, UA Negative Negative    RBC, UA 1-2 None Seen, 1-2 /hpf    WBC, UA None Seen None Seen, 1-2 /hpf    Epithelial Cells Occasional None Seen, Occasional /hpf    Bacteria, UA None Seen None Seen, Occasional /hpf   Hemoglobin A1C   Result Value Ref Range    Hemoglobin A1C 6 3 (H) Normal 3 8-5 6%; PreDiabetic 5 7-6 4%;  Diabetic >=6 5%; Glycemic control for adults with diabetes <7 0% %     mg/dl   Comprehensive metabolic panel   Result Value Ref Range    Sodium 136 135 - 147 mmol/L    Potassium 4 2 3 5 - 5 3 mmol/L    Chloride 108 96 - 108 mmol/L    CO2 29 21 - 32 mmol/L    ANION GAP -1 (L) 4 - 13 mmol/L    BUN 38 (H) 5 - 25 mg/dL    Creatinine 1 63 (H) 0 60 - 1 30 mg/dL    Glucose, Fasting 101 (H) 65 - 99 mg/dL    Calcium 9 6 8 3 - 10 1 mg/dL    AST 22 5 - 45 U/L    ALT 29 12 - 78 U/L    Alkaline Phosphatase 42 (L) 46 - 116 U/L    Total Protein 8 5 (H) 6 4 - 8 4 g/dL    Albumin 4 1 3 5 - 5 0 g/dL    Total Bilirubin 0 55 0 20 - 1 00 mg/dL    eGFR 39 ml/min/1 73sq m   CBC and Platelet   Result Value Ref Range    WBC 6 70 4 31 - 10 16 Thousand/uL    RBC 4 94 3 88 - 5 62 Million/uL    Hemoglobin 13 6 12 0 - 17 0 g/dL    Hematocrit 43 4 36 5 - 49 3 %    MCV 88 82 - 98 fL    MCH 27 5 26 8 - 34 3 pg    MCHC 31 3 (L) 31 4 - 37 4 g/dL    RDW 14 5 11 6 - 15 1 %    Platelets 243 025 - 574 Thousands/uL    MPV 11 7 8 9 - 12 7 fL   Phosphorus   Result Value Ref Range    Phosphorus 3 2 2 3 - 4 1 mg/dL

## 2023-06-07 ENCOUNTER — OFFICE VISIT (OUTPATIENT)
Dept: CARDIOLOGY CLINIC | Facility: CLINIC | Age: 78
End: 2023-06-07
Payer: COMMERCIAL

## 2023-06-07 VITALS
OXYGEN SATURATION: 99 % | BODY MASS INDEX: 32.08 KG/M2 | WEIGHT: 250 LBS | HEART RATE: 76 BPM | HEIGHT: 74 IN | DIASTOLIC BLOOD PRESSURE: 60 MMHG | SYSTOLIC BLOOD PRESSURE: 120 MMHG

## 2023-06-07 DIAGNOSIS — I25.10 CORONARY ARTERY DISEASE INVOLVING NATIVE CORONARY ARTERY OF NATIVE HEART WITHOUT ANGINA PECTORIS: ICD-10-CM

## 2023-06-07 DIAGNOSIS — N18.31 STAGE 3A CHRONIC KIDNEY DISEASE (HCC): ICD-10-CM

## 2023-06-07 DIAGNOSIS — I20.8 STABLE ANGINA PECTORIS (HCC): ICD-10-CM

## 2023-06-07 DIAGNOSIS — Z86.79 S/P AAA REPAIR: ICD-10-CM

## 2023-06-07 DIAGNOSIS — I10 ESSENTIAL HYPERTENSION: ICD-10-CM

## 2023-06-07 DIAGNOSIS — E78.2 MIXED HYPERLIPIDEMIA: ICD-10-CM

## 2023-06-07 DIAGNOSIS — Z95.1 S/P CABG X 3: ICD-10-CM

## 2023-06-07 DIAGNOSIS — Z98.890 S/P AAA REPAIR: ICD-10-CM

## 2023-06-07 DIAGNOSIS — E11.42 TYPE 2 DIABETES MELLITUS WITH DIABETIC POLYNEUROPATHY, WITHOUT LONG-TERM CURRENT USE OF INSULIN (HCC): ICD-10-CM

## 2023-06-07 PROCEDURE — 99214 OFFICE O/P EST MOD 30 MIN: CPT | Performed by: INTERNAL MEDICINE

## 2023-06-07 RX ORDER — CLOPIDOGREL BISULFATE 75 MG/1
75 TABLET ORAL DAILY
Qty: 90 TABLET | Refills: 1 | Status: ON HOLD | OUTPATIENT
Start: 2023-06-07

## 2023-06-07 NOTE — PROGRESS NOTES
Progress Note - Cardiology Office  [facility]    Diane Gutierrez [de-identified] y o  male MRN: [id number]  : 1945  Encounter: [id number]      Assessment:     1  Coronary artery disease involving native coronary artery of native heart without angina pectoris    2  Essential hypertension    3  Stable angina pectoris ([facility] )    4  S/P CABG x 3    5  Stage 3a chronic kidney disease ([facility] )    6  Mixed hyperlipidemia    7  S/P AAA repair    8  Type 2 diabetes mellitus with diabetic polyneuropathy, without long-term current use of insulin ([facility] )        Discussion Summary and Plan:    1  Coronary artery disease postcoronary artery bypass surgery with three-vessel bypass patient with history of three-vessel bypass  His last cath in 2022 shows only patent LIMA to LAD  Proximal % occluded, proximal % occluded  Vein graft to % occluded  Obtuse marginal 2  100% occluded  Vein graft to OM 2, 100% occluded  AV groove circumflex is widely patent which feeds posterior lateral branches  Patient has grade 1 to grade 2 collaterals to right circulation from the left circulation  EF is 50%  Aspirin and Plavix for 6-month then he will be only on aspirin  2   Essential hypertension  Patient also follow-up with nephrology  Currently is taking amlodipine 10 mg daily, hydrochlorothiazide 12 5, Jardiance 25, losartan 1 mg daily, Toprol-XL 50 mg daily and clonidine patch 0 1 every 12 hourly along with Imdur  3   Chronic stable angina  Currently he get exertional symptoms only  Continue aspirin and Plavix along with Imdur and Ranexa  4   Diabetes mellitus  On multiple medications  Management as per medical team     5   Mixed hyperlipidemia  Continue statin and fenofibrate cholesterol profile acceptable    6  S/p AAA repair management as per vascular    7  CKD stage III  Efraín Baker   Nephrology note noted creatinine 1 6     8   Obesity with a BMI around 32 encouraged him to lose weight  Recently there was confusion about his EKG  His EKG shows PVCs with sinus  Current also he appears to have sinus rhythm  Continue current Rx  All of 4-6 months          Patient / Yoav Sharma was advised and educated to call our office  immediately if  patient has any new symptoms of chest pain/shortness of breath, near-syncope, syncope, light headedness sustained palpitations  or any other cardiovascular symptoms before their scheduled follow-up appointment  Office number was provided #[phone]  Please call [phone] if any questions  Counseling :  A description of the counseling  Goals and Barriers  Patient's ability to self care: Yes  Medication side effect reviewed with patient in detail and all their questions answered to their satisfaction  HPI :     Doug Devine is a [de-identified]y o  year old male who came for follow up  Patient has medical history significant for coronary artery disease s/p CABG in 2003 with three-vessel bypass which was LIMA to LAD, vein graft to RCA and vein graft to circumflex  He has lost his vein graft to RCA and circumflex  His LIMA to LAD is widely patent  Cardiac catheterization also shows severe three-vessel disease with occlusion of obtuse marginal 1 vein graft, and RCA is 100% occluded and LAD is proximally 100% occluded  He had a stent which is widely patent in the circumflex  His EKG has shown sinus rhythm with heart rate 80 bpm in March 2022  He has history of chronic stable angina, type 2 diabetes mellitus, dyslipidemia, CRI  Recently he lost his wife  He is otherwise doing well  He lives alone and his family member help take care of him  He does have history of abdominal aortic aneurysm and PVD s/p repair  His med list reviewed  No nausea no vomiting no fever no chills he has been doing well today    His last blood test was on 6/1/2023 which shows his creatinine 1 63 his cholesterol profile is acceptable vitals are stable and heart rate is 76 bpm     Past surgical history is also significant for abdominal aortic aneurysm repair, gallbladder surgery, history of prostate cancer s/p prostatectomy  No recent surgery  Echo as well as his cath report and images reviewed with him  To follow-up with vascular  We will refer him back to him  Review of Systems   Constitutional: Negative for activity change, chills, diaphoresis, fever and unexpected weight change  HENT: Negative for congestion  Eyes: Negative for discharge and redness  Respiratory: Positive for shortness of breath  Negative for cough, chest tightness and wheezing  Chronic exertional and not changed   Cardiovascular: Negative  Negative for chest pain, palpitations and leg swelling  Gastrointestinal: Negative for abdominal pain, diarrhea and nausea  Endocrine: Negative  Genitourinary: Negative for decreased urine volume and urgency  Musculoskeletal: Positive for arthralgias and gait problem  Negative for back pain  Skin: Negative for rash and wound  Allergic/Immunologic: Negative  Neurological: Negative for dizziness, seizures, syncope, weakness, light-headedness and headaches  Hematological: Negative  Psychiatric/Behavioral: Negative for agitation and confusion  The patient is not nervous/anxious  Historical Information   Past Medical History:   Diagnosis Date   • CAD (coronary artery disease)    • Cancer ([facility] )     prostate   • CHF (congestive heart failure) ([facility] )    • Diabetes mellitus ([facility] )    • Myocardial infarction [facility])      Past Surgical History:   Procedure Laterality Date   • ADENOIDECTOMY     • CARDIAC CATHETERIZATION Left 10/19/2022    Procedure: Cardiac Left Heart Cath;  Surgeon: Blanca Izquierdo MD;  Location: [facility];   Service: Cardiology   • CARDIAC SURGERY  2002    3 cardiac bypass then angioplasty 7/2020   • CHOLECYSTECTOMY     • CORONARY ARTERY BYPASS GRAFT     • PROSTATE SURGERY     • TONSILLECTOMY       Social History Substance and Sexual Activity   Alcohol Use Yes   • Alcohol/week: 14 0 standard drinks of alcohol   • Types: 14 Cans of beer per week    Comment: 1 -2 daily     Social History     Substance and Sexual Activity   Drug Use Never     Social History     Tobacco Use   Smoking Status Former   • Packs/day: 1 50   • Years: 33 00   • Total pack years: 49 50   • Types: Cigarettes   • Start date: 24   • Quit date: 80   • Years since quittin 4   Smokeless Tobacco Never     Family History:   Family History   Problem Relation Age of Onset   • Diabetes Mother    • Alcohol abuse Father    • Mental illness Neg Hx        Meds/Allergies     Allergies   Allergen Reactions   • Lisinopril Rash and Lip Swelling       Current Outpatient Medications:   •  amLODIPine (NORVASC) 10 mg tablet, TAKE 1 TABLET BY MOUTH  DAILY, Disp: 90 tablet, Rfl: 3  •  aspirin 81 mg chewable tablet, Chew 81 mg daily, Disp: , Rfl:   •  atorvastatin (LIPITOR) 40 mg tablet, TAKE 1 TABLET BY MOUTH  DAILY, Disp: 90 tablet, Rfl: 3  •  Blood Glucose Monitoring Suppl (OneTouch Verio Reflect) w/Device KIT, Check blood sugars twice daily  Please substitute with appropriate alternative as covered by patient's insurance   Dx: E11 65, Disp: 1 kit, Rfl: 0  •  cadexomer iodine (IODOSORB) 0 9 % gel, Apply 1 application topically daily as needed for wound care, Disp: 10 g, Rfl: 0  •  cloNIDine (Catapres) 0 1 mg tablet, Take 1 tablet (0 1 mg total) by mouth every 12 (twelve) hours (Patient taking differently: Take 0 1 mg by mouth every 12 (twelve) hours), Disp: 180 tablet, Rfl: 1  •  clopidogrel (PLAVIX) 75 mg tablet, Take 1 tablet (75 mg total) by mouth daily, Disp: 90 tablet, Rfl: 1  •  fenofibrate 160 MG tablet, TAKE 1 TABLET BY MOUTH  DAILY, Disp: 90 tablet, Rfl: 3  •  gabapentin (NEURONTIN) 600 MG tablet, TAKE 1 TABLET BY MOUTH  TWICE DAILY, Disp: 180 tablet, Rfl: 3  •  glimepiride (AMARYL) 2 mg tablet, Take 1 tablet (2 mg total) by mouth 2 (two) times a day, "Disp: 180 tablet, Rfl: 1  •  glucose blood (OneTouch Verio) test strip, Check blood sugars twice daily  Please substitute with appropriate alternative as covered by patient's insurance  Dx: E11 65, Disp: 200 each, Rfl: 3  •  hydrochlorothiazide (HYDRODIURIL) 12 5 mg tablet, TAKE 1 TABLET BY MOUTH DAILY, Disp: 30 tablet, Rfl: 11  •  Insulin Glargine Solostar (Lantus SoloStar) 100 UNIT/ML SOPN, Inject 0 22 mL (22 Units total) under the skin every evening, Disp: 15 mL, Rfl: 3  •  Insulin Pen Needle 32G X 4 MM MISC, Use every evening, Disp: 100 each, Rfl: 5  •  isosorbide mononitrate (IMDUR) 60 mg 24 hr tablet, Take 1 tablet (60 mg total) by mouth daily, Disp: 90 tablet, Rfl: 2  •  Jardiance 25 MG TABS, TAKE 1 TABLET BY MOUTH IN  THE MORNING, Disp: 90 tablet, Rfl: 3  •  losartan (COZAAR) 100 MG tablet, TAKE 1 TABLET BY MOUTH  DAILY, Disp: 90 tablet, Rfl: 3  •  metFORMIN (GLUCOPHAGE) 500 mg tablet, TAKE 1 TABLET BY MOUTH  TWICE DAILY WITH MEALS, Disp: 180 tablet, Rfl: 3  •  metoprolol succinate (TOPROL-XL) 50 mg 24 hr tablet, TAKE 1 TABLET BY MOUTH  DAILY, Disp: 90 tablet, Rfl: 3  •  Multiple Vitamins-Minerals (MULTIVITAMIN MEN 50+ PO), Take by mouth daily, Disp: , Rfl:   •  nitroglycerin (NITROSTAT) 0 4 mg SL tablet, Place 1 tablet (0 4 mg total) under the tongue every 5 (five) minutes as needed for chest pain, Disp: 30 tablet, Rfl: 3  •  Omega-3 Fatty Acids (fish oil) 1,000 mg, Take 4,000 mg by mouth daily , Disp: , Rfl:   •  OneTouch Delica Lancets 56G MISC, Check blood sugars twice daily  Please substitute with appropriate alternative as covered by patient's insurance   Dx: E11 65, Disp: 200 each, Rfl: 3  •  ranolazine (RANEXA) 500 mg 12 hr tablet, Take 1 tablet (500 mg total) by mouth 2 (two) times a day, Disp: 60 tablet, Rfl: 3  •  sitaGLIPtin (JANUVIA) 100 mg tablet, Take 100 mg by mouth daily, Disp: , Rfl:     Vitals: Blood pressure 120/60, pulse 76, height 6' 2\" (1 88 m), weight 113 kg (250 lb), SpO2 99 " %   ?  Body mass index is 32 1 kg/m²  Wt Readings from Last 3 Encounters:   06/07/23 113 kg (250 lb)   06/05/23 113 kg (250 lb)   05/11/23 109 kg (240 lb 12 8 oz)     Vitals:    06/07/23 1441   Weight: 113 kg (250 lb)     BP Readings from Last 3 Encounters:   06/07/23 120/60   06/05/23 126/62   05/12/23 130/59       Physical Exam:  Neurologic:  Alert & oriented x 3, no new focal deficits, Not in any acute distress,  Constitutional:  Adequate built, non-toxic appearance   Neck: Normal range of motion, no tenderness,  Neck supple   Respiratory:  Bilateral air entry, mostly clear to auscultation  Cardiovascular: S1-S2 regular with a 3/6 ejection systolic murmur and S4 is present  GI:  Soft, nondistended,  nontender, no hepatosplenomegaly appreciated  Musculoskeletal:  No edema, no tenderness, no deformities  Skin:  Well hydrated, no rash   Extremities:  No edema and distal pulses are present  Psychiatric:  Speech and behavior appropriate         Diagnostic Studies Review Cardio:    Patient has cardiac catheterization done on 2/1/2022 which shows that he has history of coronary artery with three-vessel bypass  He has lost his vein graft to RCA, % occluded, vein graft to circumflex also occluded, stent placed in distal circumflex is widely patent, LIMA to LAD was widely patent and LAD proximally 100% occluded  Echo Doppler  Echo Doppler done in 7 6/26/2022 shows EF 50 to 47%, grade 1 diastolic dysfunction, left atrium mildly dilated, thickened aortic valve with mild aortic stenosis and mild MR and mild TR  Aortic root is mildly dilated  EKG:        Results for orders placed during the hospital encounter of 02/16/22    NM Myocardial Perfusion Spect (Pharmacological Induced Stress and/or Rest)    Interpretation Summary  •  Stress Combined Conclusion: Left ventricular perfusion is abnormal  There is mild photon reduction in the anterior wall at stress  Findings are consistent with ischemia   Additional area of infarct involving the mid and apical portion of the inferior wall  •  Stress Function: Left ventricular function post-stress is abnormal  Global function is mildly reduced  There were no regional wall motion abnormalities during stress  Post-stress ejection fraction is 40 %  •  Stress ECG: No ST deviation is noted  There were no arrhythmias during recovery    The ECG was negative for ischemia  •  Perfusion: There is a left ventricular perfusion defect that is small in size with severe reduction in uptake present in the mid to apical inferior location(s) that is fixed  There is a left ventricular perfusion defect that is small to medium in size with mild reduction in uptake present in the mid to apical anterior location(s) that is reversible  XR chest 1 view portable    Result Date: 5/11/2023  Narrative: CHEST INDICATION:   elevated troponin  COMPARISON: Chest radiograph dated 3/1/2023  EXAM PERFORMED/VIEWS:  XR CHEST PORTABLE FINDINGS: Status post median sternotomy  Cardiomediastinal silhouette is stably enlarged  No focal airspace consolidation  No pneumothorax or pleural effusion  Osseous structures appear within normal limits for patient age  Impression: No acute cardiopulmonary disease  Workstation performed: [id number]       Imaging:  Chest X-Ray:   XR chest pa & lateral    Result Date: 3/2/2023  Impression No acute cardiopulmonary disease   Workstation performed: [id number]       Lab Review   Lab Results   Component Value Date    HCT 43 4 06/01/2023    HGB 13 6 06/01/2023    MCV 88 06/01/2023     06/01/2023    RDW 14 5 06/01/2023    WBC 6 70 06/01/2023     BMP:  Lab Results   Component Value Date    BUN 38 (H) 06/01/2023    CALCIUM 9 6 06/01/2023     06/01/2023    CO2 29 06/01/2023    CORRECTEDCA 10 5 (H) 03/09/2023    CREATININE 1 63 (H) 06/01/2023    EGFR 39 06/01/2023    GLUC 163 (H) 05/11/2023    GLUF 101 (H) 06/01/2023    K 4 2 06/01/2023    MG 2 5 06/01/2023    SODIUM 136 "06/01/2023     Troponins:    LFT:  Lab Results   Component Value Date    ALB 4 1 06/01/2023    ALKPHOS 42 (L) 06/01/2023    ALT 29 06/01/2023    AST 22 06/01/2023    TP 8 5 (H) 06/01/2023        Lab Results   Component Value Date    HGBA1C 6 3 (H) 06/01/2023     Lipid Profile:   Lab Results   Component Value Date    CHOLESTEROL 114 03/09/2023    HDL 39 (L) 03/09/2023    LDLCALC 56 03/09/2023    TRIG 93 03/09/2023     Lab Results   Component Value Date    CHOLESTEROL 114 03/09/2023    CHOLESTEROL 131 07/19/2022       Lab Results   Component Value Date    NTBNP 420 02/16/2022            Dr Nona Youssef MD [facility]        \"This note was completed in part utilizing m-modal fluency direct voice recognition software  Grammatical errors, random word insertion, spelling mistakes, and incomplete sentences may be an occasional consequence of the system secondary to software limitations, ambient noise and hardware issues  Please read the chart carefully and recognize, using context, where substitutions have occurred  If you have any questions or concerns about the context, text or information contained within the body of this dictation, please contact myself, the provider, for further clarification  \"  "

## 2023-06-08 ENCOUNTER — OFFICE VISIT (OUTPATIENT)
Dept: PODIATRY | Facility: CLINIC | Age: 78
End: 2023-06-08
Payer: COMMERCIAL

## 2023-06-08 ENCOUNTER — LAB REQUISITION (OUTPATIENT)
Dept: LAB | Facility: HOSPITAL | Age: 78
End: 2023-06-08
Payer: COMMERCIAL

## 2023-06-08 VITALS
BODY MASS INDEX: 32.08 KG/M2 | SYSTOLIC BLOOD PRESSURE: 120 MMHG | WEIGHT: 250 LBS | HEIGHT: 74 IN | RESPIRATION RATE: 18 BRPM | DIASTOLIC BLOOD PRESSURE: 60 MMHG

## 2023-06-08 DIAGNOSIS — E11.42 DIABETIC POLYNEUROPATHY ASSOCIATED WITH TYPE 2 DIABETES MELLITUS (HCC): ICD-10-CM

## 2023-06-08 DIAGNOSIS — L97.411 DIABETIC ULCER OF RIGHT MIDFOOT ASSOCIATED WITH TYPE 2 DIABETES MELLITUS, LIMITED TO BREAKDOWN OF SKIN (HCC): Primary | ICD-10-CM

## 2023-06-08 DIAGNOSIS — L97.411 NON-PRESSURE CHRONIC ULCER OF RIGHT HEEL AND MIDFOOT LIMITED TO BREAKDOWN OF SKIN (HCC): ICD-10-CM

## 2023-06-08 DIAGNOSIS — B35.9 DERMATOPHYTOSIS: ICD-10-CM

## 2023-06-08 DIAGNOSIS — B35.1 ONYCHOMYCOSIS: ICD-10-CM

## 2023-06-08 DIAGNOSIS — E11.621 DIABETIC ULCER OF RIGHT MIDFOOT ASSOCIATED WITH TYPE 2 DIABETES MELLITUS, LIMITED TO BREAKDOWN OF SKIN (HCC): Primary | ICD-10-CM

## 2023-06-08 DIAGNOSIS — I70.209 PERIPHERAL ARTERIOSCLEROSIS (HCC): ICD-10-CM

## 2023-06-08 DIAGNOSIS — L03.115 CELLULITIS OF FOOT, RIGHT: ICD-10-CM

## 2023-06-08 DIAGNOSIS — E11.621 TYPE 2 DIABETES MELLITUS WITH FOOT ULCER (CODE) (HCC): ICD-10-CM

## 2023-06-08 PROCEDURE — 87205 SMEAR GRAM STAIN: CPT | Performed by: PODIATRIST

## 2023-06-08 PROCEDURE — 87070 CULTURE OTHR SPECIMN AEROBIC: CPT | Performed by: PODIATRIST

## 2023-06-08 PROCEDURE — 99214 OFFICE O/P EST MOD 30 MIN: CPT | Performed by: PODIATRIST

## 2023-06-08 RX ORDER — SULFAMETHOXAZOLE AND TRIMETHOPRIM 800; 160 MG/1; MG/1
1 TABLET ORAL EVERY 12 HOURS SCHEDULED
Qty: 20 TABLET | Refills: 0 | Status: ON HOLD | OUTPATIENT
Start: 2023-06-08 | End: 2023-06-18

## 2023-06-08 NOTE — PROGRESS NOTES
Assessment/Plan: Diabetic Sumner grade 1 ulcer plantar aspect right foot  Diabetic neuropathy  Mild peripheral artery disease  Pain upon ambulation  Cellulitis right foot  Mycosis of nail  Dermatophytosis  Plan  Chart reviewed  Patient examined  Patient advised on condition  Culture and sensitivity of foot done  Patient be placed on Bactrim  Today gentian violet dry sterile dressing applied  Patient will bandage with Iodosorb  In addition we will add Loprox cream          Diagnoses and all orders for this visit:    Diabetic ulcer of right midfoot associated with type 2 diabetes mellitus, limited to breakdown of skin ([facility] [address] )    Diabetic polyneuropathy associated with type 2 diabetes mellitus ([facility] [address] )    Peripheral arteriosclerosis ([facility] [address] )    Onychomycosis  -     ciclopirox (LOPROX) 0 77 % cream; Apply topically 2 (two) times a day for 20 days    Dermatophytosis  -     ciclopirox (LOPROX) 0 77 % cream; Apply topically 2 (two) times a day for 20 days    Cellulitis of foot, right  -     sulfamethoxazole-trimethoprim (BACTRIM DS) 800-160 mg per tablet; Take 1 tablet by mouth every 12 (twelve) hours for 10 days          Subjective: Urgent visit  Patient has increasing pain in the ball of his right foot  No history of trauma  Patient is diabetic      Allergies   Allergen Reactions   • Lisinopril Rash and Lip Swelling         Current Outpatient Medications:   •  ciclopirox (LOPROX) 0 77 % cream, Apply topically 2 (two) times a day for 20 days, Disp: 90 g, Rfl: 2  •  sulfamethoxazole-trimethoprim (BACTRIM DS) 800-160 mg per tablet, Take 1 tablet by mouth every 12 (twelve) hours for 10 days, Disp: 20 tablet, Rfl: 0  •  amLODIPine (NORVASC) 10 mg tablet, TAKE 1 TABLET BY MOUTH  DAILY, Disp: 90 tablet, Rfl: 3  •  aspirin 81 mg chewable tablet, Chew 81 mg daily, Disp: , Rfl:   •  atorvastatin (LIPITOR) 40 mg tablet, TAKE 1 TABLET BY MOUTH  DAILY, Disp: 90 tablet, Rfl: 3  •  Blood Glucose Monitoring Suppl (OneTouch Verio Reflect) w/Device KIT, Check blood sugars twice daily  Please substitute with appropriate alternative as covered by patient's insurance  Dx: E11 65, Disp: 1 kit, Rfl: 0  •  cadexomer iodine (IODOSORB) 0 9 % gel, Apply 1 application topically daily as needed for wound care, Disp: 10 g, Rfl: 0  •  cloNIDine (Catapres) 0 1 mg tablet, Take 1 tablet (0 1 mg total) by mouth every 12 (twelve) hours (Patient taking differently: Take 0 1 mg by mouth every 12 (twelve) hours), Disp: 180 tablet, Rfl: 1  •  clopidogrel (PLAVIX) 75 mg tablet, Take 1 tablet (75 mg total) by mouth daily, Disp: 90 tablet, Rfl: 1  •  fenofibrate 160 MG tablet, TAKE 1 TABLET BY MOUTH  DAILY, Disp: 90 tablet, Rfl: 3  •  gabapentin (NEURONTIN) 600 MG tablet, TAKE 1 TABLET BY MOUTH  TWICE DAILY, Disp: 180 tablet, Rfl: 3  •  glimepiride (AMARYL) 2 mg tablet, Take 1 tablet (2 mg total) by mouth 2 (two) times a day, Disp: 180 tablet, Rfl: 1  •  glucose blood (OneTouch Verio) test strip, Check blood sugars twice daily  Please substitute with appropriate alternative as covered by patient's insurance   Dx: E11 65, Disp: 200 each, Rfl: 3  •  hydrochlorothiazide (HYDRODIURIL) 12 5 mg tablet, TAKE 1 TABLET BY MOUTH DAILY, Disp: 30 tablet, Rfl: 11  •  Insulin Glargine Solostar (Lantus SoloStar) 100 UNIT/ML SOPN, Inject 0 22 mL (22 Units total) under the skin every evening, Disp: 15 mL, Rfl: 3  •  Insulin Pen Needle 32G X 4 MM MISC, Use every evening, Disp: 100 each, Rfl: 5  •  isosorbide mononitrate (IMDUR) 60 mg 24 hr tablet, Take 1 tablet (60 mg total) by mouth daily, Disp: 90 tablet, Rfl: 2  •  Jardiance 25 MG TABS, TAKE 1 TABLET BY MOUTH IN  THE MORNING, Disp: 90 tablet, Rfl: 3  •  losartan (COZAAR) 100 MG tablet, TAKE 1 TABLET BY MOUTH  DAILY, Disp: 90 tablet, Rfl: 3  •  metFORMIN (GLUCOPHAGE) 500 mg tablet, TAKE 1 TABLET BY MOUTH  TWICE DAILY WITH MEALS, Disp: 180 tablet, Rfl: 3  •  metoprolol succinate (TOPROL-XL) 50 mg 24 hr tablet, TAKE 1 TABLET BY MOUTH  DAILY, Disp: 90 tablet, Rfl: 3  •  Multiple Vitamins-Minerals (MULTIVITAMIN MEN 50+ PO), Take by mouth daily, Disp: , Rfl:   •  nitroglycerin (NITROSTAT) 0 4 mg SL tablet, Place 1 tablet (0 4 mg total) under the tongue every 5 (five) minutes as needed for chest pain, Disp: 30 tablet, Rfl: 3  •  Omega-3 Fatty Acids (fish oil) 1,000 mg, Take 4,000 mg by mouth daily , Disp: , Rfl:   •  OneTouch Delica Lancets 39G MISC, Check blood sugars twice daily  Please substitute with appropriate alternative as covered by patient's insurance   Dx: E11 65, Disp: 200 each, Rfl: 3  •  ranolazine (RANEXA) 500 mg 12 hr tablet, Take 1 tablet (500 mg total) by mouth 2 (two) times a day, Disp: 60 tablet, Rfl: 3  •  sitaGLIPtin (JANUVIA) 100 mg tablet, Take 100 mg by mouth daily, Disp: , Rfl:     Patient Active Problem List   Diagnosis   • Mixed hyperlipidemia   • Benign hypertension with CKD (chronic kidney disease) stage III ([facility])   • Coronary artery disease involving native coronary artery of native heart without angina pectoris   • S/P angioplasty with stent   • S/P CABG x 3   • S/P AAA repair   • S/P prostatectomy   • H/O prostate cancer   • Type 2 diabetes mellitus with diabetic polyneuropathy, without long-term current use of insulin ([facility])   • Varicose veins of left lower extremity   • History of endovascular stent graft for abdominal aortic aneurysm (AAA)   • Bruit (arterial)   • Peripheral artery disease ([facility])   • Type 2 diabetes mellitus with diabetic peripheral angiopathy without gangrene, without long-term current use of insulin ([facility])   • Actinic keratoses   • Stage 3b chronic kidney disease ([address] )   • Platelets decreased ([address] )   • Gross hematuria   • Chronic systolic heart failure ([facility])   • Mild aortic stenosis   • Chronic kidney disease-mineral and bone disorder   • Stable angina pectoris ([address] )   • Hypertensive urgency   • Elevated troponin level not due myocardial infarction          Patient ID: Valentino Lorenz is a [de-identified] y o  male  HPI    The following portions of the patient's history were reviewed and updated as appropriate:     family history includes Alcohol abuse in his father; Diabetes in his mother  reports that he quit smoking about 35 years ago  His smoking use included cigarettes  He started smoking about 67 years ago  He has a 49 50 pack-year smoking history  He has never used smokeless tobacco  He reports current alcohol use of about 14 0 standard drinks of alcohol per week  He reports that he does not use drugs  Vitals:    06/08/23 1443   BP: 120/60   Resp: 18       Review of Systems      Objective:  Patient's shoes and socks removed  Foot ExamPhysical Exam           General  General Appearance: appears stated age and healthy   Orientation: alert and oriented to person, place, and time   Affect: appropriate   Gait: antalgic         Right Foot/Ankle      Inspection and Palpation  Tenderness: metatarsals   Swelling: none   Arch: pes planus  Claw Toes: fifth toe  Hallux limitus: yes  Skin Exam: callus, dry skin and tinea;      Neurovascular  Dorsalis pedis: 1+  Posterior tibial: 1+  Saphenous nerve sensation: absent  Tibial nerve sensation: absent  Superficial peroneal nerve sensation: absent  Deep peroneal nerve sensation: absent  Sural nerve sensation: absent        Left Foot/Ankle       Inspection and Palpation  Tenderness: metatarsals   Swelling: none   Arch: pes planus  Claw toes: second toe and fifth toe  Hallux limitus: yes  Skin Exam: callus, dry skin and tinea;      Neurovascular  Dorsalis pedis: 1+  Posterior tibial: 1+  Saphenous nerve sensation: absent  Tibial nerve sensation: absent  Superficial peroneal nerve sensation: absent  Deep peroneal nerve sensation: absent  Sural nerve sensation: absent           Physical Exam  Vitals signs and nursing note reviewed     Cardiovascular:      Rate and Rhythm: Normal rate and regular rhythm       Pulses:           Dorsalis pedis pulses are 1+ on the right side and 1+ on the left side         Posterior tibial pulses are 1+ on the right side and 1+ on the left side  Feet:      Right foot:      Skin integrity: Callus and dry skin present       Left foot:      Skin integrity: Callus and dry skin present  Skin:     Capillary Refill: Capillary refill takes 2 to 3 seconds       Comments:   All nails are thick and mycotic   Patient demonstrates bilateral dermatophytosis  Traill Estimable is a pre ulcerative/ corn on the plantar aspect 2nd left toe     Right Foot/Ankle   Right Foot Inspection  Skin Exam: dry skin, callus and callus                                 Vascular     The right DP pulse is 1+  The right PT pulse is 1+  Right Toe  - Comprehensive Exam  Arch: pes planus  Claw Toes: fifth toe  Hallux limitus: yes  Swelling: none   Tenderness: metatarsals            Left Foot/Ankle  Left Foot Inspection  Skin Exam: dry skin and callus                                             Vascular     The left DP pulse is 1+  The left PT pulse is 1+  Left Toe  - Comprehensive Exam  Arch: pes planus  Claw toes: second toe and fifth toe  Hallux limitus: yes  Swelling: none   Tenderness: metatarsals      Patient's shoes and socks removed  Assign Risk Category:  Deformity present; Loss of protective sensation; Weak pulses       Risk: 2      Patient has profound dermatophytosis and xerosis of the foot bilateral   There is a fissure on the plantar lateral aspect of the right foot  Secondary cellulitis  It is a Sumner grade 1 type ulcer  Negative pus    Negative sinus or undermining

## 2023-06-10 LAB
BACTERIA WND AEROBE CULT: NORMAL
GRAM STN SPEC: NORMAL

## 2023-06-15 ENCOUNTER — OFFICE VISIT (OUTPATIENT)
Dept: FAMILY MEDICINE CLINIC | Facility: CLINIC | Age: 78
End: 2023-06-15
Payer: COMMERCIAL

## 2023-06-15 VITALS
TEMPERATURE: 97.3 F | DIASTOLIC BLOOD PRESSURE: 62 MMHG | WEIGHT: 252 LBS | HEART RATE: 56 BPM | RESPIRATION RATE: 18 BRPM | SYSTOLIC BLOOD PRESSURE: 122 MMHG | OXYGEN SATURATION: 99 % | HEIGHT: 74 IN | BODY MASS INDEX: 32.34 KG/M2

## 2023-06-15 DIAGNOSIS — E78.2 MIXED HYPERLIPIDEMIA: ICD-10-CM

## 2023-06-15 DIAGNOSIS — N18.30 BENIGN HYPERTENSION WITH CKD (CHRONIC KIDNEY DISEASE) STAGE III (HCC): ICD-10-CM

## 2023-06-15 DIAGNOSIS — N18.32 STAGE 3B CHRONIC KIDNEY DISEASE (HCC): ICD-10-CM

## 2023-06-15 DIAGNOSIS — I25.10 CORONARY ARTERY DISEASE INVOLVING NATIVE CORONARY ARTERY OF NATIVE HEART WITHOUT ANGINA PECTORIS: ICD-10-CM

## 2023-06-15 DIAGNOSIS — I12.9 BENIGN HYPERTENSION WITH CKD (CHRONIC KIDNEY DISEASE) STAGE III (HCC): ICD-10-CM

## 2023-06-15 DIAGNOSIS — L97.411 DIABETIC ULCER OF RIGHT MIDFOOT ASSOCIATED WITH TYPE 2 DIABETES MELLITUS, LIMITED TO BREAKDOWN OF SKIN (HCC): Primary | ICD-10-CM

## 2023-06-15 DIAGNOSIS — Z12.5 SCREENING FOR PROSTATE CANCER: ICD-10-CM

## 2023-06-15 DIAGNOSIS — E11.621 DIABETIC ULCER OF RIGHT MIDFOOT ASSOCIATED WITH TYPE 2 DIABETES MELLITUS, LIMITED TO BREAKDOWN OF SKIN (HCC): Primary | ICD-10-CM

## 2023-06-15 PROBLEM — Z79.4 TYPE 2 DIABETES MELLITUS WITH DIABETIC POLYNEUROPATHY, WITH LONG-TERM CURRENT USE OF INSULIN (HCC): Status: ACTIVE | Noted: 2021-06-10

## 2023-06-15 PROBLEM — L97.419 DIABETIC ULCER OF RIGHT MIDFOOT ASSOCIATED WITH TYPE 2 DIABETES MELLITUS (HCC): Status: ACTIVE | Noted: 2023-06-15

## 2023-06-15 PROBLEM — Z79.4 TYPE 2 DIABETES MELLITUS WITH DIABETIC PERIPHERAL ANGIOPATHY WITHOUT GANGRENE, WITH LONG-TERM CURRENT USE OF INSULIN (HCC): Status: ACTIVE | Noted: 2022-01-11

## 2023-06-15 PROCEDURE — 99214 OFFICE O/P EST MOD 30 MIN: CPT | Performed by: FAMILY MEDICINE

## 2023-06-15 NOTE — ASSESSMENT & PLAN NOTE
Lab Results   Component Value Date    CREATININE 1 63 (H) 06/01/2023    CREATININE 1 23 05/11/2023    CREATININE 1 46 (H) 03/09/2023    EGFR 39 06/01/2023    EGFR 55 05/11/2023    EGFR 44 03/09/2023   Stable BP, was recently seen by renal

## 2023-06-15 NOTE — PROGRESS NOTES
Assessment/Plan:    1  Diabetic ulcer of right midfoot associated with type 2 diabetes mellitus, limited to breakdown of skin (HCC)  -     Albumin / creatinine urine ratio; Future; Expected date: 10/13/2023  -     Comprehensive metabolic panel; Future; Expected date: 10/13/2023  -     Hemoglobin A1C; Future; Expected date: 10/13/2023  -     Lipid Panel with Direct LDL reflex; Future; Expected date: 10/13/2023    2  Benign hypertension with CKD (chronic kidney disease) stage III [facility])  Assessment & Plan:  Lab Results   Component Value Date    CREATININE 1 63 (H) 06/01/2023    CREATININE 1 23 05/11/2023    CREATININE 1 46 (H) 03/09/2023    EGFR 39 06/01/2023    EGFR 55 05/11/2023    EGFR 44 03/09/2023   Stable BP, was recently seen by renal    Orders:  -     Albumin / creatinine urine ratio; Future; Expected date: 10/13/2023  -     Comprehensive metabolic panel; Future; Expected date: 10/13/2023  -     Hemoglobin A1C; Future; Expected date: 10/13/2023  -     Lipid Panel with Direct LDL reflex; Future; Expected date: 10/13/2023    3  Coronary artery disease involving native coronary artery of native heart without angina pectoris  -     Albumin / creatinine urine ratio; Future; Expected date: 10/13/2023  -     Comprehensive metabolic panel; Future; Expected date: 10/13/2023  -     Hemoglobin A1C; Future; Expected date: 10/13/2023  -     Lipid Panel with Direct LDL reflex; Future; Expected date: 10/13/2023    4  Mixed hyperlipidemia  -     Albumin / creatinine urine ratio; Future; Expected date: 10/13/2023  -     Comprehensive metabolic panel; Future; Expected date: 10/13/2023  -     Hemoglobin A1C; Future; Expected date: 10/13/2023  -     Lipid Panel with Direct LDL reflex; Future; Expected date: 10/13/2023    5  Stage 3b chronic kidney disease ([address] )    6  Screening for prostate cancer  -     PSA, Total Screen; Future            There are no Patient Instructions on file for this visit      Return in 4 months (on 10/15/2023) for Diabetes follow-up and AWV - 30 min appt  Subjective:      Patient ID: Cecile Madrigal is a [de-identified] y o  male  Chief Complaint   Patient presents with   • Follow-up     Solomon Rae CMA        Pt is sched for a three month follow up  Pt is seeing dr Holly Baca for a diabetic ulcer on rt foot - it is dressed       The following portions of the patient's history were reviewed and updated as appropriate: allergies, current medications, past family history, past medical history, past social history, past surgical history and problem list     Review of Systems   Constitutional: Negative for activity change, appetite change, chills, diaphoresis, fatigue, fever and unexpected weight change  HENT: Negative for congestion, dental problem, ear pain, mouth sores, sinus pressure, sinus pain, sore throat and trouble swallowing  Eyes: Negative for photophobia, discharge and itching  Respiratory: Negative for apnea, chest tightness and shortness of breath  Cardiovascular: Negative for chest pain, palpitations and leg swelling  Gastrointestinal: Negative for abdominal distention, abdominal pain, blood in stool, nausea and vomiting  Endocrine: Negative for cold intolerance, heat intolerance, polydipsia, polyphagia and polyuria  Genitourinary: Negative for difficulty urinating  Musculoskeletal: Negative for arthralgias  Skin: Positive for wound  Negative for color change  Neurological: Negative for dizziness, syncope, speech difficulty and headaches  Hematological: Negative for adenopathy  Psychiatric/Behavioral: Negative for agitation and behavioral problems           Current Outpatient Medications   Medication Sig Dispense Refill   • amLODIPine (NORVASC) 10 mg tablet TAKE 1 TABLET BY MOUTH  DAILY 90 tablet 3   • aspirin 81 mg chewable tablet Chew 81 mg daily     • atorvastatin (LIPITOR) 40 mg tablet TAKE 1 TABLET BY MOUTH  DAILY 90 tablet 3   • Blood Glucose Monitoring Suppl (OneTouch Verio Reflect) w/Device KIT Check blood sugars twice daily  Please substitute with appropriate alternative as covered by patient's insurance  Dx: E11 65 1 kit 0   • cadexomer iodine (IODOSORB) 0 9 % gel Apply 1 application topically daily as needed for wound care 10 g 0   • ciclopirox (LOPROX) 0 77 % cream Apply topically 2 (two) times a day for 20 days 90 g 2   • cloNIDine (Catapres) 0 1 mg tablet Take 1 tablet (0 1 mg total) by mouth every 12 (twelve) hours (Patient taking differently: Take 0 1 mg by mouth every 12 (twelve) hours) 180 tablet 1   • clopidogrel (PLAVIX) 75 mg tablet Take 1 tablet (75 mg total) by mouth daily 90 tablet 1   • fenofibrate 160 MG tablet TAKE 1 TABLET BY MOUTH  DAILY 90 tablet 3   • gabapentin (NEURONTIN) 600 MG tablet TAKE 1 TABLET BY MOUTH  TWICE DAILY 180 tablet 3   • glimepiride (AMARYL) 2 mg tablet Take 1 tablet (2 mg total) by mouth 2 (two) times a day 180 tablet 1   • glucose blood (OneTouch Verio) test strip Check blood sugars twice daily  Please substitute with appropriate alternative as covered by patient's insurance   Dx: E11 65 200 each 3   • hydrochlorothiazide (HYDRODIURIL) 12 5 mg tablet TAKE 1 TABLET BY MOUTH DAILY 30 tablet 11   • Insulin Glargine Solostar (Lantus SoloStar) 100 UNIT/ML SOPN Inject 0 22 mL (22 Units total) under the skin every evening 15 mL 3   • Insulin Pen Needle 32G X 4 MM MISC Use every evening 100 each 5   • isosorbide mononitrate (IMDUR) 60 mg 24 hr tablet Take 1 tablet (60 mg total) by mouth daily 90 tablet 2   • Jardiance 25 MG TABS TAKE 1 TABLET BY MOUTH IN  THE MORNING 90 tablet 3   • losartan (COZAAR) 100 MG tablet TAKE 1 TABLET BY MOUTH  DAILY 90 tablet 3   • metFORMIN (GLUCOPHAGE) 500 mg tablet TAKE 1 TABLET BY MOUTH  TWICE DAILY WITH MEALS 180 tablet 3   • metoprolol succinate (TOPROL-XL) 50 mg 24 hr tablet TAKE 1 TABLET BY MOUTH  DAILY 90 tablet 3   • Multiple Vitamins-Minerals (MULTIVITAMIN MEN 50+ PO) Take by mouth daily     • nitroglycerin "(NITROSTAT) 0 4 mg SL tablet Place 1 tablet (0 4 mg total) under the tongue every 5 (five) minutes as needed for chest pain 30 tablet 3   • Omega-3 Fatty Acids (fish oil) 1,000 mg Take 4,000 mg by mouth daily      • OneTouch Delica Lancets 55U MISC Check blood sugars twice daily  Please substitute with appropriate alternative as covered by patient's insurance  Dx: E11 65 200 each 3   • ranolazine (RANEXA) 500 mg 12 hr tablet Take 1 tablet (500 mg total) by mouth 2 (two) times a day 60 tablet 3   • sitaGLIPtin (JANUVIA) 100 mg tablet Take 100 mg by mouth daily     • sulfamethoxazole-trimethoprim (BACTRIM DS) 800-160 mg per tablet Take 1 tablet by mouth every 12 (twelve) hours for 10 days 20 tablet 0     No current facility-administered medications for this visit  Objective:    /62   Pulse 56 Comment: irregular  Temp (!) 97 3 °F (36 3 °C)   Resp 18   Ht 6' 2\" (1 88 m)   Wt 114 kg (252 lb)   SpO2 99%   BMI 32 35 kg/m²        Physical Exam  Vitals and nursing note reviewed  Constitutional:       General: He is not in acute distress  Appearance: He is well-developed  He is not diaphoretic  HENT:      Head: Normocephalic and atraumatic  Right Ear: External ear normal       Left Ear: External ear normal       Nose: Nose normal       Mouth/Throat:      Pharynx: No oropharyngeal exudate  Eyes:      General: No scleral icterus  Right eye: No discharge  Left eye: No discharge  Pupils: Pupils are equal, round, and reactive to light  Neck:      Thyroid: No thyromegaly  Cardiovascular:      Rate and Rhythm: Normal rate  Heart sounds: Normal heart sounds  No murmur heard  Pulmonary:      Effort: Pulmonary effort is normal  No respiratory distress  Breath sounds: Normal breath sounds  No wheezing  Abdominal:      General: Bowel sounds are normal  There is no distension  Palpations: Abdomen is soft  There is no mass  Tenderness:  There is no abdominal " tenderness  There is no guarding or rebound  Musculoskeletal:         General: Normal range of motion  Skin:     General: Skin is warm and dry  Findings: No erythema or rash  Comments: dressed wound on rt mid foot   Neurological:      Mental Status: He is alert  Coordination: Coordination normal       Deep Tendon Reflexes: Reflexes normal    Psychiatric:         Behavior: Behavior normal               Recent Results (from the past 672 hour(s))   Magnesium    Collection Time: 06/01/23  7:54 AM   Result Value Ref Range    Magnesium 2 5 1 6 - 2 6 mg/dL   PTH, intact    Collection Time: 06/01/23  7:54 AM   Result Value Ref Range    PTH 20 2 12 0 - 88 0 pg/mL   Protein / creatinine ratio, urine    Collection Time: 06/01/23  7:54 AM   Result Value Ref Range    Creatinine, Ur 55 4 mg/dL    Protein Urine Random 20 mg/dL    Prot/Creat Ratio, Ur 0 36 (H) 0 00 - 0 10   Vitamin D 25 hydroxy    Collection Time: 06/01/23  7:54 AM   Result Value Ref Range    Vit D, 25-Hydroxy 39 2 30 0 - 100 0 ng/mL   Urinalysis with microscopic    Collection Time: 06/01/23  7:54 AM   Result Value Ref Range    Color, UA Light Yellow     Clarity, UA Clear     Specific Gravity, UA 1 017 1 003 - 1 030    pH, UA 6 0 4 5, 5 0, 5 5, 6 0, 6 5, 7 0, 7 5, 8 0    Leukocytes, UA (A) Negative     Elevated glucose may cause decreased leukocyte values   See urine microscopic for UWBC result    Nitrite, UA Negative Negative    Protein, UA Trace (A) Negative mg/dl    Glucose, UA >=1000 (1%) (A) Negative mg/dl    Ketones, UA Negative Negative mg/dl    Urobilinogen, UA <2 0 <2 0 mg/dl mg/dl    Bilirubin, UA Negative Negative    Occult Blood, UA Negative Negative    RBC, UA 1-2 None Seen, 1-2 /hpf    WBC, UA None Seen None Seen, 1-2 /hpf    Epithelial Cells Occasional None Seen, Occasional /hpf    Bacteria, UA None Seen None Seen, Occasional /hpf   Hemoglobin A1C    Collection Time: 06/01/23  7:54 AM   Result Value Ref Range    Hemoglobin A1C 6 3 (H) Normal 3 8-5 6%; PreDiabetic 5 7-6 4%; Diabetic >=6 5%; Glycemic control for adults with diabetes <7 0% %     mg/dl   Comprehensive metabolic panel    Collection Time: 06/01/23  7:54 AM   Result Value Ref Range    Sodium 136 135 - 147 mmol/L    Potassium 4 2 3 5 - 5 3 mmol/L    Chloride 108 96 - 108 mmol/L    CO2 29 21 - 32 mmol/L    ANION GAP -1 (L) 4 - 13 mmol/L    BUN 38 (H) 5 - 25 mg/dL    Creatinine 1 63 (H) 0 60 - 1 30 mg/dL    Glucose, Fasting 101 (H) 65 - 99 mg/dL    Calcium 9 6 8 3 - 10 1 mg/dL    AST 22 5 - 45 U/L    ALT 29 12 - 78 U/L    Alkaline Phosphatase 42 (L) 46 - 116 U/L    Total Protein 8 5 (H) 6 4 - 8 4 g/dL    Albumin 4 1 3 5 - 5 0 g/dL    Total Bilirubin 0 55 0 20 - 1 00 mg/dL    eGFR 39 ml/min/1 73sq m   CBC and Platelet    Collection Time: 06/01/23  7:54 AM   Result Value Ref Range    WBC 6 70 4 31 - 10 16 Thousand/uL    RBC 4 94 3 88 - 5 62 Million/uL    Hemoglobin 13 6 12 0 - 17 0 g/dL    Hematocrit 43 4 36 5 - 49 3 %    MCV 88 82 - 98 fL    MCH 27 5 26 8 - 34 3 pg    MCHC 31 3 (L) 31 4 - 37 4 g/dL    RDW 14 5 11 6 - 15 1 %    Platelets 055 017 - 313 Thousands/uL    MPV 11 7 8 9 - 12 7 fL   Phosphorus    Collection Time: 06/01/23  7:54 AM   Result Value Ref Range    Phosphorus 3 2 2 3 - 4 1 mg/dL   Wound culture and Gram stain    Collection Time: 06/08/23  3:15 PM    Specimen: Foot, Right;  Wound   Result Value Ref Range    Wound Culture 1+ Growth of     Gram Stain Result No Polys or Bacteria seen          Jennifer Paris DO

## 2023-06-16 ENCOUNTER — APPOINTMENT (EMERGENCY)
Dept: RADIOLOGY | Facility: HOSPITAL | Age: 78
DRG: 682 | End: 2023-06-16
Payer: COMMERCIAL

## 2023-06-16 ENCOUNTER — HOSPITAL ENCOUNTER (INPATIENT)
Facility: HOSPITAL | Age: 78
LOS: 3 days | Discharge: HOME | DRG: 682 | End: 2023-06-19
Attending: EMERGENCY MEDICINE | Admitting: INTERNAL MEDICINE
Payer: COMMERCIAL

## 2023-06-16 DIAGNOSIS — I50.9 CHF (CONGESTIVE HEART FAILURE) (HCC): Primary | ICD-10-CM

## 2023-06-16 DIAGNOSIS — E11.621 DIABETIC ULCER OF RIGHT MIDFOOT ASSOCIATED WITH TYPE 2 DIABETES MELLITUS, LIMITED TO BREAKDOWN OF SKIN (HCC): ICD-10-CM

## 2023-06-16 DIAGNOSIS — I10 ESSENTIAL HYPERTENSION: ICD-10-CM

## 2023-06-16 DIAGNOSIS — I49.3 FREQUENT PVCS: ICD-10-CM

## 2023-06-16 DIAGNOSIS — R11.0 NAUSEA: ICD-10-CM

## 2023-06-16 DIAGNOSIS — N18.9 ACUTE KIDNEY INJURY SUPERIMPOSED ON CHRONIC KIDNEY DISEASE (HCC): ICD-10-CM

## 2023-06-16 DIAGNOSIS — N17.9 ACUTE KIDNEY INJURY SUPERIMPOSED ON CHRONIC KIDNEY DISEASE (HCC): ICD-10-CM

## 2023-06-16 DIAGNOSIS — L97.411 DIABETIC ULCER OF RIGHT MIDFOOT ASSOCIATED WITH TYPE 2 DIABETES MELLITUS, LIMITED TO BREAKDOWN OF SKIN (HCC): ICD-10-CM

## 2023-06-16 DIAGNOSIS — E11.42 TYPE 2 DIABETES MELLITUS WITH DIABETIC POLYNEUROPATHY, WITHOUT LONG-TERM CURRENT USE OF INSULIN (HCC): ICD-10-CM

## 2023-06-16 DIAGNOSIS — R09.02 HYPOXIA: ICD-10-CM

## 2023-06-16 PROBLEM — I50.33 ACUTE ON CHRONIC DIASTOLIC HEART FAILURE (HCC): Status: ACTIVE | Noted: 2023-06-16

## 2023-06-16 LAB
2HR DELTA HS TROPONIN: 3 NG/L
4HR DELTA HS TROPONIN: 1 NG/L
ALBUMIN SERPL BCP-MCNC: 3.8 G/DL (ref 3.5–5)
ALP SERPL-CCNC: 36 U/L (ref 34–104)
ALT SERPL W P-5'-P-CCNC: 18 U/L (ref 7–52)
ANION GAP SERPL CALCULATED.3IONS-SCNC: 8 MMOL/L (ref 4–13)
APTT PPP: 33 SECONDS (ref 23–37)
AST SERPL W P-5'-P-CCNC: 19 U/L (ref 13–39)
BASOPHILS # BLD AUTO: 0.02 THOUSANDS/ÂΜL (ref 0–0.1)
BASOPHILS NFR BLD AUTO: 0 % (ref 0–1)
BILIRUB SERPL-MCNC: 0.69 MG/DL (ref 0.2–1)
BNP SERPL-MCNC: 890 PG/ML (ref 0–100)
BUN SERPL-MCNC: 43 MG/DL (ref 5–25)
CALCIUM SERPL-MCNC: 8.9 MG/DL (ref 8.4–10.2)
CARDIAC TROPONIN I PNL SERPL HS: 39 NG/L
CARDIAC TROPONIN I PNL SERPL HS: 40 NG/L
CARDIAC TROPONIN I PNL SERPL HS: 42 NG/L
CHLORIDE SERPL-SCNC: 101 MMOL/L (ref 96–108)
CO2 SERPL-SCNC: 24 MMOL/L (ref 21–32)
CREAT SERPL-MCNC: 2.23 MG/DL (ref 0.6–1.3)
EOSINOPHIL # BLD AUTO: 0.09 THOUSAND/ÂΜL (ref 0–0.61)
EOSINOPHIL NFR BLD AUTO: 1 % (ref 0–6)
ERYTHROCYTE [DISTWIDTH] IN BLOOD BY AUTOMATED COUNT: 14 % (ref 11.6–15.1)
FLUAV RNA RESP QL NAA+PROBE: NEGATIVE
FLUBV RNA RESP QL NAA+PROBE: NEGATIVE
GFR SERPL CREATININE-BSD FRML MDRD: 26 ML/MIN/1.73SQ M
GLUCOSE SERPL-MCNC: 66 MG/DL (ref 65–140)
GLUCOSE SERPL-MCNC: 79 MG/DL (ref 65–140)
GLUCOSE SERPL-MCNC: 89 MG/DL (ref 65–140)
HCT VFR BLD AUTO: 36.8 % (ref 36.5–49.3)
HGB BLD-MCNC: 11.9 G/DL (ref 12–17)
IMM GRANULOCYTES # BLD AUTO: 0.03 THOUSAND/UL (ref 0–0.2)
IMM GRANULOCYTES NFR BLD AUTO: 0 % (ref 0–2)
INR PPP: 1.2 (ref 0.84–1.19)
LACTATE SERPL-SCNC: 1 MMOL/L (ref 0.5–2)
LYMPHOCYTES # BLD AUTO: 1.16 THOUSANDS/ÂΜL (ref 0.6–4.47)
LYMPHOCYTES NFR BLD AUTO: 11 % (ref 14–44)
MAGNESIUM SERPL-MCNC: 2.2 MG/DL (ref 1.9–2.7)
MCH RBC QN AUTO: 27.6 PG (ref 26.8–34.3)
MCHC RBC AUTO-ENTMCNC: 32.3 G/DL (ref 31.4–37.4)
MCV RBC AUTO: 85 FL (ref 82–98)
MONOCYTES # BLD AUTO: 0.92 THOUSAND/ÂΜL (ref 0.17–1.22)
MONOCYTES NFR BLD AUTO: 9 % (ref 4–12)
NEUTROPHILS # BLD AUTO: 8.24 THOUSANDS/ÂΜL (ref 1.85–7.62)
NEUTS SEG NFR BLD AUTO: 79 % (ref 43–75)
NRBC BLD AUTO-RTO: 0 /100 WBCS
PLATELET # BLD AUTO: 226 THOUSANDS/UL (ref 149–390)
PMV BLD AUTO: 11.3 FL (ref 8.9–12.7)
POTASSIUM SERPL-SCNC: 4.5 MMOL/L (ref 3.5–5.3)
PROT SERPL-MCNC: 7.6 G/DL (ref 6.4–8.4)
PROTHROMBIN TIME: 15.4 SECONDS (ref 11.6–14.5)
RBC # BLD AUTO: 4.31 MILLION/UL (ref 3.88–5.62)
RSV RNA RESP QL NAA+PROBE: NEGATIVE
SARS-COV-2 RNA RESP QL NAA+PROBE: NEGATIVE
SODIUM SERPL-SCNC: 133 MMOL/L (ref 135–147)
WBC # BLD AUTO: 10.46 THOUSAND/UL (ref 4.31–10.16)

## 2023-06-16 PROCEDURE — 0241U HB NFCT DS VIR RESP RNA 4 TRGT: CPT | Performed by: EMERGENCY MEDICINE

## 2023-06-16 PROCEDURE — G1004 CDSM NDSC: HCPCS

## 2023-06-16 PROCEDURE — 36415 COLL VENOUS BLD VENIPUNCTURE: CPT | Performed by: EMERGENCY MEDICINE

## 2023-06-16 PROCEDURE — 85730 THROMBOPLASTIN TIME PARTIAL: CPT | Performed by: EMERGENCY MEDICINE

## 2023-06-16 PROCEDURE — 99223 1ST HOSP IP/OBS HIGH 75: CPT | Performed by: INTERNAL MEDICINE

## 2023-06-16 PROCEDURE — 71045 X-RAY EXAM CHEST 1 VIEW: CPT

## 2023-06-16 PROCEDURE — 84484 ASSAY OF TROPONIN QUANT: CPT | Performed by: EMERGENCY MEDICINE

## 2023-06-16 PROCEDURE — 93005 ELECTROCARDIOGRAM TRACING: CPT

## 2023-06-16 PROCEDURE — 83605 ASSAY OF LACTIC ACID: CPT | Performed by: EMERGENCY MEDICINE

## 2023-06-16 PROCEDURE — 80053 COMPREHEN METABOLIC PANEL: CPT | Performed by: EMERGENCY MEDICINE

## 2023-06-16 PROCEDURE — 87040 BLOOD CULTURE FOR BACTERIA: CPT | Performed by: EMERGENCY MEDICINE

## 2023-06-16 PROCEDURE — 83735 ASSAY OF MAGNESIUM: CPT | Performed by: INTERNAL MEDICINE

## 2023-06-16 PROCEDURE — 82948 REAGENT STRIP/BLOOD GLUCOSE: CPT

## 2023-06-16 PROCEDURE — 85610 PROTHROMBIN TIME: CPT | Performed by: EMERGENCY MEDICINE

## 2023-06-16 PROCEDURE — 99285 EMERGENCY DEPT VISIT HI MDM: CPT

## 2023-06-16 PROCEDURE — 83880 ASSAY OF NATRIURETIC PEPTIDE: CPT | Performed by: EMERGENCY MEDICINE

## 2023-06-16 PROCEDURE — 85025 COMPLETE CBC W/AUTO DIFF WBC: CPT | Performed by: EMERGENCY MEDICINE

## 2023-06-16 PROCEDURE — 71250 CT THORAX DX C-: CPT

## 2023-06-16 RX ORDER — FUROSEMIDE 10 MG/ML
40 INJECTION INTRAMUSCULAR; INTRAVENOUS ONCE
Status: COMPLETED | OUTPATIENT
Start: 2023-06-16 | End: 2023-06-16

## 2023-06-16 RX ORDER — HEPARIN SODIUM 5000 [USP'U]/ML
5000 INJECTION, SOLUTION INTRAVENOUS; SUBCUTANEOUS EVERY 8 HOURS SCHEDULED
Status: DISCONTINUED | OUTPATIENT
Start: 2023-06-16 | End: 2023-06-19 | Stop reason: HOSPADM

## 2023-06-16 RX ORDER — ISOSORBIDE MONONITRATE 60 MG/1
60 TABLET, EXTENDED RELEASE ORAL DAILY
Status: DISCONTINUED | OUTPATIENT
Start: 2023-06-17 | End: 2023-06-19 | Stop reason: HOSPADM

## 2023-06-16 RX ORDER — INSULIN GLARGINE 100 [IU]/ML
10 INJECTION, SOLUTION SUBCUTANEOUS
Status: DISCONTINUED | OUTPATIENT
Start: 2023-06-16 | End: 2023-06-16

## 2023-06-16 RX ORDER — AMLODIPINE BESYLATE 5 MG/1
10 TABLET ORAL DAILY
Status: DISCONTINUED | OUTPATIENT
Start: 2023-06-17 | End: 2023-06-16

## 2023-06-16 RX ORDER — RANOLAZINE 500 MG/1
500 TABLET, EXTENDED RELEASE ORAL 2 TIMES DAILY
Status: DISCONTINUED | OUTPATIENT
Start: 2023-06-16 | End: 2023-06-19 | Stop reason: HOSPADM

## 2023-06-16 RX ORDER — INSULIN LISPRO 100 [IU]/ML
1-5 INJECTION, SOLUTION INTRAVENOUS; SUBCUTANEOUS
Status: DISCONTINUED | OUTPATIENT
Start: 2023-06-17 | End: 2023-06-19 | Stop reason: HOSPADM

## 2023-06-16 RX ORDER — INSULIN GLARGINE 100 [IU]/ML
10 INJECTION, SOLUTION SUBCUTANEOUS
Status: DISCONTINUED | OUTPATIENT
Start: 2023-06-17 | End: 2023-06-19 | Stop reason: HOSPADM

## 2023-06-16 RX ORDER — CLONIDINE HYDROCHLORIDE 0.1 MG/1
TABLET ORAL
Qty: 180 TABLET | Refills: 1 | Status: SHIPPED | OUTPATIENT
Start: 2023-06-16

## 2023-06-16 RX ORDER — INSULIN LISPRO 100 [IU]/ML
1-5 INJECTION, SOLUTION INTRAVENOUS; SUBCUTANEOUS
Status: DISCONTINUED | OUTPATIENT
Start: 2023-06-16 | End: 2023-06-19 | Stop reason: HOSPADM

## 2023-06-16 RX ORDER — METOPROLOL SUCCINATE 50 MG/1
50 TABLET, EXTENDED RELEASE ORAL DAILY
Status: DISCONTINUED | OUTPATIENT
Start: 2023-06-17 | End: 2023-06-17

## 2023-06-16 RX ORDER — CLOPIDOGREL BISULFATE 75 MG/1
75 TABLET ORAL DAILY
Status: DISCONTINUED | OUTPATIENT
Start: 2023-06-17 | End: 2023-06-19 | Stop reason: HOSPADM

## 2023-06-16 RX ORDER — ACETAMINOPHEN 325 MG/1
650 TABLET ORAL EVERY 6 HOURS PRN
Status: DISCONTINUED | OUTPATIENT
Start: 2023-06-16 | End: 2023-06-19 | Stop reason: HOSPADM

## 2023-06-16 RX ORDER — FUROSEMIDE 10 MG/ML
40 INJECTION INTRAMUSCULAR; INTRAVENOUS 2 TIMES DAILY
Status: DISCONTINUED | OUTPATIENT
Start: 2023-06-17 | End: 2023-06-18

## 2023-06-16 RX ORDER — ASPIRIN 81 MG/1
81 TABLET, CHEWABLE ORAL DAILY
Status: DISCONTINUED | OUTPATIENT
Start: 2023-06-17 | End: 2023-06-19 | Stop reason: HOSPADM

## 2023-06-16 RX ORDER — ATORVASTATIN CALCIUM 40 MG/1
40 TABLET, FILM COATED ORAL
Status: DISCONTINUED | OUTPATIENT
Start: 2023-06-17 | End: 2023-06-19 | Stop reason: HOSPADM

## 2023-06-16 RX ADMIN — FUROSEMIDE 40 MG: 10 INJECTION, SOLUTION INTRAVENOUS at 20:13

## 2023-06-16 RX ADMIN — RANOLAZINE 500 MG: 500 TABLET, FILM COATED, EXTENDED RELEASE ORAL at 22:00

## 2023-06-16 RX ADMIN — HEPARIN SODIUM 5000 UNITS: 5000 INJECTION INTRAVENOUS; SUBCUTANEOUS at 22:00

## 2023-06-17 ENCOUNTER — APPOINTMENT (INPATIENT)
Dept: NON INVASIVE DIAGNOSTICS | Facility: HOSPITAL | Age: 78
DRG: 682 | End: 2023-06-17
Payer: COMMERCIAL

## 2023-06-17 ENCOUNTER — APPOINTMENT (INPATIENT)
Dept: RADIOLOGY | Facility: HOSPITAL | Age: 78
DRG: 682 | End: 2023-06-17
Payer: COMMERCIAL

## 2023-06-17 PROBLEM — I49.3 FREQUENT PVCS: Status: ACTIVE | Noted: 2023-06-17

## 2023-06-17 LAB
ALBUMIN SERPL BCP-MCNC: 3.7 G/DL (ref 3.5–5)
ALP SERPL-CCNC: 34 U/L (ref 34–104)
ALT SERPL W P-5'-P-CCNC: 13 U/L (ref 7–52)
ANION GAP SERPL CALCULATED.3IONS-SCNC: 8 MMOL/L (ref 4–13)
AORTIC ROOT: 4.1 CM
AORTIC VALVE MEAN VELOCITY: 23.3 M/S
APICAL FOUR CHAMBER EJECTION FRACTION: 53 %
APTT PPP: 34 SECONDS (ref 23–37)
ASCENDING AORTA: 4 CM
AST SERPL W P-5'-P-CCNC: 14 U/L (ref 13–39)
AV AREA BY CONTINUOUS VTI: 1.7 CM2
AV AREA PEAK VELOCITY: 1.6 CM2
AV LVOT MEAN GRADIENT: 3 MMHG
AV LVOT PEAK GRADIENT: 6 MMHG
AV MEAN GRADIENT: 25 MMHG
AV PEAK GRADIENT: 41 MMHG
AV VALVE AREA: 1.7 CM2
AV VELOCITY RATIO: 0.38
BACTERIA UR QL AUTO: ABNORMAL /HPF
BASOPHILS # BLD AUTO: 0.03 THOUSANDS/ÂΜL (ref 0–0.1)
BASOPHILS NFR BLD AUTO: 0 % (ref 0–1)
BILIRUB SERPL-MCNC: 0.69 MG/DL (ref 0.2–1)
BILIRUB UR QL STRIP: NEGATIVE
BUN SERPL-MCNC: 45 MG/DL (ref 5–25)
CALCIUM SERPL-MCNC: 9 MG/DL (ref 8.4–10.2)
CHLORIDE SERPL-SCNC: 103 MMOL/L (ref 96–108)
CLARITY UR: CLEAR
CO2 SERPL-SCNC: 25 MMOL/L (ref 21–32)
COLOR UR: YELLOW
CREAT SERPL-MCNC: 2.25 MG/DL (ref 0.6–1.3)
CREAT UR-MCNC: 33.5 MG/DL
CREAT UR-MCNC: 35.7 MG/DL
DOP CALC AO PEAK VEL: 3.2 M/S
DOP CALC AO VTI: 60.69 CM
DOP CALC LVOT AREA: 4.15 CM2
DOP CALC LVOT DIAMETER: 2.3 CM
DOP CALC LVOT PEAK VEL VTI: 24.79 CM
DOP CALC LVOT PEAK VEL: 1.2 M/S
DOP CALC LVOT STROKE INDEX: 43.3 ML/M2
DOP CALC LVOT STROKE VOLUME: 102.94
E WAVE DECELERATION TIME: 172 MS
EOSINOPHIL # BLD AUTO: 0.08 THOUSAND/ÂΜL (ref 0–0.61)
EOSINOPHIL NFR BLD AUTO: 1 % (ref 0–6)
ERYTHROCYTE [DISTWIDTH] IN BLOOD BY AUTOMATED COUNT: 13.8 % (ref 11.6–15.1)
FRACTIONAL SHORTENING: 28 (ref 28–44)
GFR SERPL CREATININE-BSD FRML MDRD: 26 ML/MIN/1.73SQ M
GLUCOSE SERPL-MCNC: 149 MG/DL (ref 65–140)
GLUCOSE SERPL-MCNC: 161 MG/DL (ref 65–140)
GLUCOSE SERPL-MCNC: 176 MG/DL (ref 65–140)
GLUCOSE SERPL-MCNC: 83 MG/DL (ref 65–140)
GLUCOSE SERPL-MCNC: 91 MG/DL (ref 65–140)
GLUCOSE UR STRIP-MCNC: ABNORMAL MG/DL
HCT VFR BLD AUTO: 37.1 % (ref 36.5–49.3)
HGB BLD-MCNC: 11.9 G/DL (ref 12–17)
HGB UR QL STRIP.AUTO: NEGATIVE
IMM GRANULOCYTES # BLD AUTO: 0.03 THOUSAND/UL (ref 0–0.2)
IMM GRANULOCYTES NFR BLD AUTO: 0 % (ref 0–2)
INR PPP: 1.26 (ref 0.84–1.19)
INTERVENTRICULAR SEPTUM IN DIASTOLE (PARASTERNAL SHORT AXIS VIEW): 1.5 CM
INTERVENTRICULAR SEPTUM: 1.5 CM (ref 0.6–1.1)
KETONES UR STRIP-MCNC: NEGATIVE MG/DL
LAAS-AP2: 26.4 CM2
LAAS-AP4: 27.9 CM2
LEFT ATRIUM SIZE: 5.4 CM
LEFT INTERNAL DIMENSION IN SYSTOLE: 3.8 CM (ref 2.1–4)
LEFT VENTRICULAR INTERNAL DIMENSION IN DIASTOLE: 5.3 CM (ref 3.5–6)
LEFT VENTRICULAR POSTERIOR WALL IN END DIASTOLE: 1.4 CM
LEFT VENTRICULAR STROKE VOLUME: 74 ML
LEUKOCYTE ESTERASE UR QL STRIP: ABNORMAL
LVSV (TEICH): 74 ML
LYMPHOCYTES # BLD AUTO: 1.15 THOUSANDS/ÂΜL (ref 0.6–4.47)
LYMPHOCYTES NFR BLD AUTO: 11 % (ref 14–44)
MAGNESIUM SERPL-MCNC: 2.2 MG/DL (ref 1.9–2.7)
MCH RBC QN AUTO: 27.3 PG (ref 26.8–34.3)
MCHC RBC AUTO-ENTMCNC: 32.1 G/DL (ref 31.4–37.4)
MCV RBC AUTO: 85 FL (ref 82–98)
MICROALBUMIN UR-MCNC: 9.1 MG/L (ref 0–20)
MICROALBUMIN/CREAT 24H UR: 27 MG/G CREATININE (ref 0–30)
MONOCYTES # BLD AUTO: 0.91 THOUSAND/ÂΜL (ref 0.17–1.22)
MONOCYTES NFR BLD AUTO: 9 % (ref 4–12)
MV E'TISSUE VEL-SEP: 7 CM/S
MV PEAK A VEL: 0.98 M/S
MV PEAK E VEL: 118 CM/S
MV STENOSIS PRESSURE HALF TIME: 50 MS
MV VALVE AREA P 1/2 METHOD: 4.4
NEUTROPHILS # BLD AUTO: 8.51 THOUSANDS/ÂΜL (ref 1.85–7.62)
NEUTS SEG NFR BLD AUTO: 79 % (ref 43–75)
NITRITE UR QL STRIP: NEGATIVE
NON-SQ EPI CELLS URNS QL MICRO: ABNORMAL /HPF
NRBC BLD AUTO-RTO: 0 /100 WBCS
PH UR STRIP.AUTO: 5.5 [PH]
PLATELET # BLD AUTO: 213 THOUSANDS/UL (ref 149–390)
PMV BLD AUTO: 11.3 FL (ref 8.9–12.7)
POTASSIUM SERPL-SCNC: 4 MMOL/L (ref 3.5–5.3)
PROCALCITONIN SERPL-MCNC: 0.12 NG/ML
PROT SERPL-MCNC: 7.1 G/DL (ref 6.4–8.4)
PROT UR STRIP-MCNC: NEGATIVE MG/DL
PROTHROMBIN TIME: 15.9 SECONDS (ref 11.6–14.5)
RBC # BLD AUTO: 4.36 MILLION/UL (ref 3.88–5.62)
RBC #/AREA URNS AUTO: ABNORMAL /HPF
RIGHT ATRIUM AREA SYSTOLE A4C: 16.9 CM2
RIGHT VENTRICLE ID DIMENSION: 3.9 CM
SL CV LEFT ATRIUM LENGTH A2C: 7.4 CM
SL CV LV EF: 50
SL CV PED ECHO LEFT VENTRICLE DIASTOLIC VOLUME (MOD BIPLANE) 2D: 135 ML
SL CV PED ECHO LEFT VENTRICLE SYSTOLIC VOLUME (MOD BIPLANE) 2D: 61 ML
SODIUM 24H UR-SCNC: 81 MOL/L
SODIUM SERPL-SCNC: 136 MMOL/L (ref 135–147)
SP GR UR STRIP.AUTO: 1.01 (ref 1–1.03)
TR MAX PG: 52 MMHG
TR PEAK VELOCITY: 3.6 M/S
TRICUSPID ANNULAR PLANE SYSTOLIC EXCURSION: 2.3 CM
TRICUSPID VALVE PEAK REGURGITATION VELOCITY: 3.59 M/S
TSH SERPL DL<=0.05 MIU/L-ACNC: 1.54 UIU/ML (ref 0.45–4.5)
UROBILINOGEN UR QL STRIP.AUTO: 0.2 E.U./DL
WBC # BLD AUTO: 10.71 THOUSAND/UL (ref 4.31–10.16)
WBC #/AREA URNS AUTO: ABNORMAL /HPF

## 2023-06-17 PROCEDURE — 84443 ASSAY THYROID STIM HORMONE: CPT | Performed by: INTERNAL MEDICINE

## 2023-06-17 PROCEDURE — G1004 CDSM NDSC: HCPCS

## 2023-06-17 PROCEDURE — 85025 COMPLETE CBC W/AUTO DIFF WBC: CPT | Performed by: INTERNAL MEDICINE

## 2023-06-17 PROCEDURE — 80053 COMPREHEN METABOLIC PANEL: CPT | Performed by: INTERNAL MEDICINE

## 2023-06-17 PROCEDURE — 93005 ELECTROCARDIOGRAM TRACING: CPT

## 2023-06-17 PROCEDURE — 93306 TTE W/DOPPLER COMPLETE: CPT

## 2023-06-17 PROCEDURE — 83735 ASSAY OF MAGNESIUM: CPT | Performed by: INTERNAL MEDICINE

## 2023-06-17 PROCEDURE — 84300 ASSAY OF URINE SODIUM: CPT | Performed by: INTERNAL MEDICINE

## 2023-06-17 PROCEDURE — 85730 THROMBOPLASTIN TIME PARTIAL: CPT | Performed by: INTERNAL MEDICINE

## 2023-06-17 PROCEDURE — 93306 TTE W/DOPPLER COMPLETE: CPT | Performed by: INTERNAL MEDICINE

## 2023-06-17 PROCEDURE — 99223 1ST HOSP IP/OBS HIGH 75: CPT | Performed by: INTERNAL MEDICINE

## 2023-06-17 PROCEDURE — 82043 UR ALBUMIN QUANTITATIVE: CPT | Performed by: INTERNAL MEDICINE

## 2023-06-17 PROCEDURE — 82948 REAGENT STRIP/BLOOD GLUCOSE: CPT

## 2023-06-17 PROCEDURE — 99232 SBSQ HOSP IP/OBS MODERATE 35: CPT | Performed by: NURSE PRACTITIONER

## 2023-06-17 PROCEDURE — 74176 CT ABD & PELVIS W/O CONTRAST: CPT

## 2023-06-17 PROCEDURE — 82570 ASSAY OF URINE CREATININE: CPT | Performed by: INTERNAL MEDICINE

## 2023-06-17 PROCEDURE — 81001 URINALYSIS AUTO W/SCOPE: CPT | Performed by: INTERNAL MEDICINE

## 2023-06-17 PROCEDURE — 85610 PROTHROMBIN TIME: CPT | Performed by: INTERNAL MEDICINE

## 2023-06-17 PROCEDURE — 84145 PROCALCITONIN (PCT): CPT | Performed by: STUDENT IN AN ORGANIZED HEALTH CARE EDUCATION/TRAINING PROGRAM

## 2023-06-17 RX ORDER — CLONIDINE HYDROCHLORIDE 0.1 MG/1
0.1 TABLET ORAL EVERY 12 HOURS SCHEDULED
Status: DISCONTINUED | OUTPATIENT
Start: 2023-06-17 | End: 2023-06-19 | Stop reason: HOSPADM

## 2023-06-17 RX ORDER — TAMSULOSIN HYDROCHLORIDE 0.4 MG/1
0.4 CAPSULE ORAL
Status: DISCONTINUED | OUTPATIENT
Start: 2023-06-17 | End: 2023-06-19 | Stop reason: HOSPADM

## 2023-06-17 RX ADMIN — INSULIN GLARGINE 10 UNITS: 100 INJECTION, SOLUTION SUBCUTANEOUS at 21:38

## 2023-06-17 RX ADMIN — ATORVASTATIN CALCIUM 40 MG: 40 TABLET, FILM COATED ORAL at 16:41

## 2023-06-17 RX ADMIN — FUROSEMIDE 40 MG: 10 INJECTION, SOLUTION INTRAVENOUS at 17:05

## 2023-06-17 RX ADMIN — ASPIRIN 81 MG CHEWABLE TABLET 81 MG: 81 TABLET CHEWABLE at 08:47

## 2023-06-17 RX ADMIN — RANOLAZINE 500 MG: 500 TABLET, FILM COATED, EXTENDED RELEASE ORAL at 08:47

## 2023-06-17 RX ADMIN — METOPROLOL TARTRATE 25 MG: 25 TABLET, FILM COATED ORAL at 14:59

## 2023-06-17 RX ADMIN — TAMSULOSIN HYDROCHLORIDE 0.4 MG: 0.4 CAPSULE ORAL at 17:34

## 2023-06-17 RX ADMIN — HEPARIN SODIUM 5000 UNITS: 5000 INJECTION INTRAVENOUS; SUBCUTANEOUS at 21:38

## 2023-06-17 RX ADMIN — RANOLAZINE 500 MG: 500 TABLET, FILM COATED, EXTENDED RELEASE ORAL at 17:05

## 2023-06-17 RX ADMIN — FUROSEMIDE 40 MG: 10 INJECTION, SOLUTION INTRAVENOUS at 08:46

## 2023-06-17 RX ADMIN — HEPARIN SODIUM 5000 UNITS: 5000 INJECTION INTRAVENOUS; SUBCUTANEOUS at 05:20

## 2023-06-17 RX ADMIN — INSULIN LISPRO 1 UNITS: 100 INJECTION, SOLUTION INTRAVENOUS; SUBCUTANEOUS at 16:45

## 2023-06-17 RX ADMIN — TRIMETHOBENZAMIDE HYDROCHLORIDE 200 MG: 100 INJECTION INTRAMUSCULAR at 18:51

## 2023-06-17 RX ADMIN — ACETAMINOPHEN 650 MG: 325 TABLET ORAL at 08:46

## 2023-06-17 RX ADMIN — HEPARIN SODIUM 5000 UNITS: 5000 INJECTION INTRAVENOUS; SUBCUTANEOUS at 14:59

## 2023-06-17 RX ADMIN — CLOPIDOGREL BISULFATE 75 MG: 75 TABLET ORAL at 08:47

## 2023-06-17 RX ADMIN — CLONIDINE HYDROCHLORIDE 0.1 MG: 0.1 TABLET ORAL at 21:38

## 2023-06-17 NOTE — ASSESSMENT & PLAN NOTE
Lab Results   Component Value Date    EGFR 26 06/17/2023    EGFR 26 06/16/2023    EGFR 39 06/01/2023    CREATININE 2 25 (H) 06/17/2023    CREATININE 2 23 (H) 06/16/2023    CREATININE 1 63 (H) 06/01/2023   · Creatinine 2 2 POA, baseline appears to be 1 5  · History of CKD stage II and follows with Dr Nery Vaughan outpatient; per review of chart appears the etiology of CKD suspected to be hypertensive nephrosclerosis and diabetic kidney disease  · History of prostate cancer and has urethral device that assists with the opening of the sphincter  · CT abdomen pelvis without contrast to rule out any structural abnormalities and get a better view of patient's underlying urethral device in case patient is retaining urine and would need urinary catheter; imaging pending  · Follow up on urine electrolytes to calculate FENa     · Urine Albumin/Creatinine: normal  · Cr 2 25 today continue to monitor   · Obtain bladder scan   · Avoid hypotension   · Nephrology following

## 2023-06-17 NOTE — ASSESSMENT & PLAN NOTE
"· History of prostate cancer for which patient reports undergoing what sounds like a prostatectomy at [facility] in 2014 or 2015  · Reports he has a \"urethral device\" that assists with opening and closing of the urethra as a result of the procedure and on exam patient has a device in the right side of the scrotum which she reports he presses to relieve himself  · May need urology consultation if patient is retaining urine in the setting of IVY on CKD given his unclear urethral anatomy and possible need for urethral catheterization  "

## 2023-06-17 NOTE — ASSESSMENT & PLAN NOTE
· Echo in July 2022 with LVEF 78-34%, grade 1 diastolic dysfunction as well as mild aortic stenosis and mildly dilated aortic root  · Repeat 2D echo to further evaluate

## 2023-06-17 NOTE — ASSESSMENT & PLAN NOTE
Wt Readings from Last 3 Encounters:   06/16/23 114 kg (252 lb)   06/15/23 114 kg (252 lb)   06/08/23 113 kg (250 lb)     · Shortness of breath, abdominal distention, lower extremity edema  · BNP elevated, CT chest without contrast with evidence of pulmonary edema  · EKG sinus, rate 79, first-degree AV block with frequent PVCs  · Initial troponin negative  · History of CABG x3  · History of diastolic heart failure as well as mild aortic stenosis  · Echo in July 2022 with LV EF 50-55%  Diastolic function mildly abnormal, consistent with grade 1 relaxation  Mild aortic stenosis  Aortic root mildly dilated  · Requiring 2 L O2 to maintain sats greater than 90% in the ER  · Noted IVY on CKD  · Given IV Lasix 40 mg in the ER  · Not on any PTA Lasix or Bumex, does appear patient is on PTA HCTZ  · Admit to medicine on telemetry  We will need to monitor patient's urine output overnight after he was given IV Lasix 40 mg in the ER as well as his renal function in the morning  For now, we will schedule Lasix IV 40 mg twice daily to treat for heart failure exacerbation with deference to patient's breathing status  We will repeat an echocardiogram   Consult CV team   We will hold patient's PTA antihypertensive agents besides his metoprolol as well as his antianginal agents to avoid provoking hypotension in the setting of aggressive diuresis    · See IVY on CKD section  · Supplemental O2 as needed

## 2023-06-17 NOTE — ASSESSMENT & PLAN NOTE
"Lab Results   Component Value Date    HGBA1C 6 3 (H) 06/01/2023       No results for input(s): \"POCGLU\" in the last 72 hours      Blood Sugar Average: Last 72 hrs:  · Glucose on metabolic panel 79  · As such, we will decrease PTA glargine to 10 units nightly instead of 22 units nightly; put on sliding scale insulin with meals; hold PTA oral hypoglycemic agents  "

## 2023-06-17 NOTE — ASSESSMENT & PLAN NOTE
· Bigeminy noted on telemetry overnight  · EKG obtained showing 1st degree AVB with frequent PVC's  · Mag 2 2, potassium 4 0:   · TSH WNL  · Cardiology rec change metoprolol succinate to metoprolol tartrate 12 5 mg BID  · Obtain bmp and mag in am   · Monitor on telemetry

## 2023-06-17 NOTE — ASSESSMENT & PLAN NOTE
· Echo in July 2022 with LVEF 90-98%, grade 1 diastolic dysfunction as well as mild aortic stenosis and mildly dilated aortic root  · Echo 6/17:  LVEF 49%, reduced systolic function with grade 2 diastolic dysfunction with mild to moderate AV stenosis

## 2023-06-17 NOTE — ASSESSMENT & PLAN NOTE
· History of AAA repair; denies any abdominal pain or chest pain  · Follows with vascular team outpatient

## 2023-06-17 NOTE — PLAN OF CARE
Problem: MOBILITY - ADULT  Goal: Maintain or return to baseline ADL function  Description: INTERVENTIONS:  -  Assess patient's ability to carry out ADLs; assess patient's baseline for ADL function and identify physical deficits which impact ability to perform ADLs (bathing, care of mouth/teeth, toileting, grooming, dressing, etc )  - Assess/evaluate cause of self-care deficits   - Assess range of motion  - Assess patient's mobility; develop plan if impaired  - Assess patient's need for assistive devices and provide as appropriate  - Encourage maximum independence but intervene and supervise when necessary  - Involve family in performance of ADLs  - Assess for home care needs following discharge   - Consider OT consult to assist with ADL evaluation and planning for discharge  - Provide patient education as appropriate  Outcome: Progressing     Problem: CARDIOVASCULAR - ADULT  Goal: Maintains optimal cardiac output and hemodynamic stability  Description: INTERVENTIONS:  - Monitor I/O, vital signs and rhythm  - Monitor for S/S and trends of decreased cardiac output  - Administer and titrate ordered vasoactive medications to optimize hemodynamic stability  - Assess quality of pulses, skin color and temperature  - Assess for signs of decreased coronary artery perfusion  - Instruct patient to report change in severity of symptoms  Outcome: Progressing  Goal: Absence of cardiac dysrhythmias or at baseline rhythm  Description: INTERVENTIONS:  - Continuous cardiac monitoring, vital signs, obtain 12 lead EKG if ordered  - Administer antiarrhythmic and heart rate control medications as ordered  - Monitor electrolytes and administer replacement therapy as ordered  Outcome: Progressing     Problem: SAFETY ADULT  Goal: Maintain or return to baseline ADL function  Description: INTERVENTIONS:  -  Assess patient's ability to carry out ADLs; assess patient's baseline for ADL function and identify physical deficits which impact ability to perform ADLs (bathing, care of mouth/teeth, toileting, grooming, dressing, etc )  - Assess/evaluate cause of self-care deficits   - Assess range of motion  - Assess patient's mobility; develop plan if impaired  - Assess patient's need for assistive devices and provide as appropriate  - Encourage maximum independence but intervene and supervise when necessary  - Involve family in performance of ADLs  - Assess for home care needs following discharge   - Consider OT consult to assist with ADL evaluation and planning for discharge  - Provide patient education as appropriate  Outcome: Progressing     Problem: RESPIRATORY - ADULT  Goal: Achieves optimal ventilation and oxygenation  Description: INTERVENTIONS:  - Assess for changes in respiratory status  - Assess for changes in mentation and behavior  - Position to facilitate oxygenation and minimize respiratory effort  - Oxygen administered by appropriate delivery if ordered  - Initiate smoking cessation education as indicated  - Encourage broncho-pulmonary hygiene including cough, deep breathe, Incentive Spirometry  - Assess the need for suctioning and aspirate as needed  - Assess and instruct to report SOB or any respiratory difficulty  - Respiratory Therapy support as indicated  Outcome: Progressing     Problem: GENITOURINARY - ADULT  Goal: Maintains or returns to baseline urinary function  Description: INTERVENTIONS:  - Assess urinary function  - Encourage oral fluids to ensure adequate hydration if ordered  - Administer IV fluids as ordered to ensure adequate hydration  - Administer ordered medications as needed  - Offer frequent toileting  - Follow urinary retention protocol if ordered  Outcome: Progressing

## 2023-06-17 NOTE — ASSESSMENT & PLAN NOTE
· Follows with Dr Dax Peterson outpatient and saw about a week and a half ago; denies any chest pain today or abdominal pain  · Per Dr Jake Rendon last note history of triple-vessel bypass with last catheterization in November 2022 which showed only a patent LIMA to LAD  Proximal % occluded, proximal % occluded  Vein graft % occluded  Obtuse marginal 200% occluded  Vein graft to OM T2, 100% occluded  AV groove circumflex widely patent which feeds posterior lateral breaks  Patient has grade 1 to grade 2 collaterals to right circulation from the left circulation  EF is 50%  · Per last cardiology note patient is on aspirin and Plavix for 6 months and then will only be on aspirin  · Continue PTA aspirin, Plavix, statin  · Continuing PTA metoprolol  · We are holding patient's other antihypertensive agents as we are aggressively diuresing patient to avoid provoking hypotension      · If patient has elevated blood pressures, we will systematically add them back on  · CV team consulted

## 2023-06-17 NOTE — CONSULTS
[address] [place] [de-identified] y o  male MRN: [id number]  Unit/Bed#: [address] [place] [address] Encounter: [id number]    ASSESSMENT and PLAN:    [de-identified] y o  male with a past medical history of CKD stage III, chronic combined ischemic CHF 40 to 50%, CAD with prior bypass surgery, vein graft to RCA and vein graft to OM 2 is occluded, AAA repair, history of prostate cancer, who was admitted to [facility] after presenting with worsening shortness of breath  A renal consultation is requested today for assistance in the management of acute kidney injury  1- Acute on chronic kidney injury stage III-present on admission  Native disease hypertension     - Outpatient nephrologist- Dr Burton Show  - Baseline creatinine- 1 3 to 1 6 mg/dL creatinine as outpatient on June 1 1 6 mg/dL  - Admission creatinine-2 2 mg/dL  - Urinalysis- on June 1 is 1-2 RBC, no WBC, glucose urea  - Renal imaging-  - To note patient was on Bactrim as outpatient prior to admission since June 6    Overall, rising creatinine may be in the setting of acute decompensated heart failure but also may have decrease tubular secretion in the setting of Bactrim      Plan  - Agree with holding Bactrim  - Follow-up  CT scan without contrast results  - Follow PVR  - Check urinalysis  - Agree with holding thiazide diuretic  - Agree with loop diuretic IV per primary team and cardiology team  - Daily standing weight  - I/os; avoid nephrotoxic agents  - No objection to Jardiance to continue for now given benefit and acute heart failure but if GFR continues to decrease, may need to consider holding  - Agree with holding fenofibrate for now  - Restart clonidine to avoid rebound hypertension-ordered  - Agree with holding losartan for now  - Can reintroduce amlodipine if blood pressures remain elevated after clonidine restarted    2-electrolytes- stable sodium and potassium    3-acid/base- stable bicarbonate    4-CHF and volume overload- initially requiring oxygen therapy for hypoxia and IV diuresis    - Cardiology team on board  - CT of the chest-moderate septal thickening moderate multifocal groundglass opacity due to interstitial and alveolar edema  - Echocardiogram- June 17-EF 88%, grade 2 diastolic dysfunction, mild to moderate aortic stenoses, mild mitral regurgitation, moderate to severe elevated RV pressure  - Initially started on IV Lasix twice daily  - Follow daily weight    5-CAD with history of bypass surgery with CAD and vein graft also    6- history of prostate cancer with urethral device    - CT scan-    7- diabetic foot ulcer-Per primary team    - Patient was on Bactrim started on June 6 as outpatient and was given a 10-day course    8-hypertension-on carvedilol and isosorbide  Blood pressures 150s to 160s  Monitor with diuresis also  Restart carvedilol  Hold amlodipine and losartan for now  HISTORY OF PRESENT ILLNESS:  Requesting Physician: Aziza Salgado DO  Reason for Consult: IVY Rangel is a [de-identified] y o  male with a past medical history of CKD stage III, chronic combined ischemic CHF 40 to 50%, CAD with prior bypass surgery, vein graft to RCA and vein graft to OM 2 is occluded, AAA repair, history of prostate cancer, who was admitted to [facility] 71 after presenting with worsening shortness of breath  A renal consultation is requested today for assistance in the management of acute kidney injury  Patient denies changes in medications recently with the exception of Bactrim starting June 6  Denies fevers, nausea, vomiting    States that on a m  of admission was having shortness of breath and daughter encouraged the patient to come to the hospital     PAST MEDICAL HISTORY:  Past Medical History:   Diagnosis Date   • CAD (coronary artery disease)    • Cancer ([facility] )     prostate   • [facility]congest[facility] failure[facility] )    • Diabetes mellitus ([facility] )    • Myocardial infarction ([facility] )        PAST SURGICAL HISTORY:  Past Surgical History:   Procedure Laterality Date   • ADENOIDECTOMY     • CARDIAC CATHETERIZATION Left 10/19/2022    Procedure: Cardiac Left Heart Cath;  Surgeon: Consuelo Wiley MD;  Location: [facility]AC [facility];   Service: Cardiology   • CARDIAC SURGERY  2002    3 cardiac bypass then angioplasty 2020   • CHOLECYSTECTOMY     • CORONARY ARTERY BYPASS GRAFT     • PROSTATE SURGERY     • TONSILLECTOMY         ALLERGIES:  Allergies   Allergen Reactions   • Lisinopril Rash and Lip Swelling       SOCIAL HISTORY:  Social History     Substance and Sexual Activity   Alcohol Use Yes   • Alcohol/week: 14 0 standard drinks of alcohol   • Types: 14 Cans of beer per week    Comment: 1 -2 daily     Social History     Substance and Sexual Activity   Drug Use Never     Social History     Tobacco Use   Smoking Status Former   • Packs/day: 1 50   • Years: 33 00   • Total pack years: 49 50   • Types: Cigarettes   • Start date:    • Quit date:    • Years since quittin 4   Smokeless Tobacco Never       FAMILY HISTORY:  Family History   Problem Relation Age of Onset   • Diabetes Mother    • Alcohol abuse Father    • Mental illness Neg Hx        MEDICATIONS:    Current Facility-Administered Medications:   •  acetaminophen (TYLENOL) tablet 650 mg, 650 mg, Oral, Q6H PRN, Minda Blue DO, 650 mg at 23 1690  •  aspirin chewable tablet 81 mg, 81 mg, Oral, Daily, Minda Blue DO, 81 mg at 23 0847  •  atorvastatin (LIPITOR) tablet 40 mg, 40 mg, Oral, Daily With Dinner, Minda Blue DO  •  clopidogrel (PLAVIX) tablet 75 mg, 75 mg, Oral, Daily, Minda Blue DO, 75 mg at 23 0847  •  furosemide (LASIX) injection 40 mg, 40 mg, Intravenous, BID, Minda Blue DO, 40 mg at 23 0846  •  heparin (porcine) subcutaneous injection 5,000 Units, 5,000 Units, Subcutaneous, Q8H [address], Minda Blue DO, 5,000 Units at 23 0520  •  insulin glargine (LANTUS) subcutaneous injection 10 Units 0 1 mL, 10 Units, Subcutaneous, HS, Minda Blue DO  • insulin lispro (HumaLOG) 100 units/mL subcutaneous injection 1-5 Units, 1-5 Units, Subcutaneous, TID AC **AND** Fingerstick Glucose (POCT), , , TID AC, Daysi Lie, DO  •  insulin lispro (HumaLOG) 100 units/mL subcutaneous injection 1-5 Units, 1-5 Units, Subcutaneous, HS, Daysi Lie, DO  •  isosorbide mononitrate (IMDUR) 24 hr tablet 60 mg, 60 mg, Oral, Daily, Daysi Lie, DO  •  metoprolol tartrate (LOPRESSOR) tablet 25 mg, 25 mg, Oral, Q12H [facility]  •  ranolazine (RANEXA) 12 hr tablet 500 mg, 500 mg, Oral, BID, Daysi Gardner DO, 500 mg at 06/17/23 0847    REVIEW OF SYSTEMS:    All the systems were reviewed and were negative except as documented on the HPI      PHYSICAL EXAM:  Current Weight: Weight - Scale: 112 kg (246 lb 14 6 oz)  First Weight: Weight - Scale: 114 kg (252 lb)  Vitals:    06/17/23 0900 06/17/23 1000 06/17/23 1035 06/17/23 1100   BP: 166/62  152/64    Pulse: 57 88 88 91   Resp:       Temp: 98 °F (36 7 °C)      SpO2: 91%  91%    Weight:       Height:           Intake/Output Summary (Last 24 hours) at 6/17/2023 1421  Last data filed at 6/17/2023 1101  Gross per 24 hour   Intake 710 ml   Output 3280 ml   Net -2570 ml     Physical Exam  General: NAD  Skin: no rash  Eyes: anicteric sclera  ENT: moist mucous membrane  Neck: supple  Chest: CTA b/l, no ronchii, no wheeze, no rubs, with the exception of fine rales  CVS: s1s2, no murmur, no gallop, no rub  Abdomen: soft, nontender, nl sounds  Extremities: no significant pitting edema LE b/l  : no gilbert  Neuro: AAOX3  Psych: normal affect      Invasive Devices:      Lab Results:   Results from last 7 days   Lab Units 06/17/23  0519 06/16/23  1803   WBC Thousand/uL 10 71* 10 46*   HEMOGLOBIN g/dL 11 9* 11 9*   HEMATOCRIT % 37 1 36 8   PLATELETS Thousands/uL 213 226   POTASSIUM mmol/L 4 0 4 5   CHLORIDE mmol/L 103 101   CO2 mmol/L 25 24   BUN mg/dL 45* 43*   CREATININE mg/dL 2 25* 2 23*   CALCIUM mg/dL 9 0 8 9   MAGNESIUM mg/dL 2 2 2 2   ALK PHOS U/L 34 36   ALT U/L 13 18   AST U/L 14 19

## 2023-06-17 NOTE — ASSESSMENT & PLAN NOTE
Lab Results   Component Value Date    EGFR 26 06/16/2023    EGFR 39 06/01/2023    EGFR 55 05/11/2023    CREATININE 2 23 (H) 06/16/2023    CREATININE 1 63 (H) 06/01/2023    CREATININE 1 23 05/11/2023   · Creatinine 2 2, baseline appears to be 1 5  · History of CKD stage II and follows with Dr Naga Palacio outpatient; per review of chart appears the etiology of CKD suspected to be hypertensive nephrosclerosis and diabetic kidney disease  · History of prostate cancer and has urethral device that assists with the opening of the sphincter  · Admitted with acute on chronic diastolic heart failure as well as shortness of breath, acute hypoxemic respiratory failure requiring 2 L to maintain sats greater than 90%, elevated BNP and evidence of pulmonary edema  · Given IV Lasix 40 mg in the ER  · Given patient's respiratory failure/diastolic heart failure, we will continue IV Lasix 40 mg twice daily with deference to patient's breathing status  We will need to monitor patient's renal status closely with daily metabolic panel  We will order a CT abdomen pelvis without contrast to rule out any structural abnormalities and get a better view of patient's underlying urethral device in case patient is retaining urine and would need urinary catheter; as such, urinary retention protocol placed  If patient is retaining urine, we will likely need a urology consultation as patient reports he has never had a urinary catheter put in and thinks he was told by his oncologist to tell people he had this procedure prior to any catheter being placed  · Order urine electrolytes to calculate FENa    Order urine microalbumin creatinine ratio  · Consult nephrology  ·

## 2023-06-17 NOTE — CONSULTS
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 EF 45 to 50%-patient reports increased volume over the past couple of days  He is followed closely with nephrology and cardiology  He was not on a loop diuretic as an outpatient  He denies significant dietary indiscretion  He denies having chest heaviness  Agree with current loop diuretic usage  He will need to be on a loop diuretic on discharge  Continue his Jardiance therapy  Recommend discontinuing his hydrochlorothiazide  Frequent PVCs-possibly secondary to volume overload  Recommend continuing his beta-blocker therapy  Coronary artery disease with history of bypass grafting status post cardiac cath in November 2022 showing patent DAWSON to LAD  His native LAD and RCA are occluded  His vein graft to his RCA is 100% occluded  His vein graft to his OM 2 is 100% occluded  Kidney disease stage II-III    Nominal aortic aneurysm and peripheral vascular disease status post repair      HPI: Lauren Caldwell is a [de-identified]y o  year old male who presents with worsening shortness of breath for the past couple of days  He denies major sodium usage  He denied having chest discomfort  He is currently in bereavement after the loss of his wife  He lives alone his family members help take care of him        Historical Information   Past Medical History:   Diagnosis Date   • CAD (coronary artery disease)    • Cancer ([facility] [address] )     prostate   • CHF (congestive heart failure) ([facility] )    • Diabetes mellitus ([facility] )    • Myocardial infarction [facility])      Past Surgical History:   Procedure Laterality Date   • ADENOIDECTOMY     • CARDIAC CATHETERIZATION Left 10/19/2022    Procedure: Cardiac Left Heart Cath;  Surgeon: Davina Garcia MD;  Location: [facility] CARDIAC CATH LAB; Service: Cardiology   • CARDIAC SURGERY  2002    3 cardiac bypass then angioplasty 2020   • CHOLECYSTECTOMY     • CORONARY ARTERY BYPASS GRAFT     • PROSTATE SURGERY     • TONSILLECTOMY       Social History     Substance and Sexual Activity   Alcohol Use Yes   • Alcohol/week: 14 0 standard drinks of alcohol   • Types: 14 Cans of beer per week    Comment: 1 -2 daily     Social History     Substance and Sexual Activity   Drug Use Never     Social History     Tobacco Use   Smoking Status Former   • Packs/day: 1 50   • Years: 33 00   • Total pack years: 49 50   • Types: Cigarettes   • Start date:    • Quit date:    • Years since quittin 4   Smokeless Tobacco Never     Family History   Problem Relation Age of Onset   • Diabetes Mother    • Alcohol abuse Father    • Mental illness Neg Hx        Meds/Allergies   Prior to Admission medications    Medication Sig Start Date End Date Taking?  Authorizing Provider   amLODIPine (NORVASC) 10 mg tablet TAKE 1 TABLET BY MOUTH  DAILY 22  Yes Clarisa Blue DO   aspirin 81 mg chewable tablet Chew 81 mg daily   Yes Historical Provider, MD   atorvastatin (LIPITOR) 40 mg tablet TAKE 1 TABLET BY MOUTH  DAILY 3/17/23  Yes Sandra Boswell MD   ciclopirox (LOPROX) 0 77 % cream Apply topically 2 (two) times a day for 20 days 23 Yes Alfred Torres DPM   cloNIDine (CATAPRES) 0 1 mg tablet take 1 tablet by mouth every 12 hours 23  Yes Clarisa Blue DO   clopidogrel (PLAVIX) 75 mg tablet Take 1 tablet (75 mg total) by mouth daily 23  Yes Davina Garcia MD   fenofibrate 160 MG tablet TAKE 1 TABLET BY MOUTH  DAILY 4/10/23  Yes Sandra Boswell MD   gabapentin (NEURONTIN) 600 MG tablet TAKE 1 TABLET BY MOUTH  TWICE DAILY 2/10/23  Yes Alfred Torres DPM   glimepiride (AMARYL) 2 mg tablet Take 1 tablet (2 mg total) by mouth 2 (two) times a day 5/22/23  Yes Marchelle Kocher, DO   hydrochlorothiazide (HYDRODIURIL) 12 5 mg tablet TAKE 1 TABLET BY MOUTH DAILY 4/11/23  Yes Marchelle Kocher, DO   isosorbide mononitrate (IMDUR) 60 mg 24 hr tablet Take 1 tablet (60 mg total) by mouth daily 11/10/22  Yes Carmen rGanger MD   Jardiance 25 MG TABS TAKE 1 TABLET BY MOUTH IN  THE MORNING 11/18/22  Yes Marchelle Kocher, DO   losartan (COZAAR) 100 MG tablet TAKE 1 TABLET BY MOUTH  DAILY 11/30/22  Yes Carmen Granger MD   metFORMIN (GLUCOPHAGE) 500 mg tablet TAKE 1 TABLET BY MOUTH  TWICE DAILY WITH MEALS 2/10/23  Yes Marchelle Kocher, DO   metoprolol succinate (TOPROL-XL) 50 mg 24 hr tablet TAKE 1 TABLET BY MOUTH  DAILY 11/30/22  Yes Carmen Granger MD   Multiple Vitamins-Minerals (MULTIVITAMIN MEN 50+ PO) Take by mouth daily   Yes Historical Provider, MD   Omega-3 Fatty Acids (fish oil) 1,000 mg Take 4,000 mg by mouth daily    Yes Historical Provider, MD   ranolazine (RANEXA) 500 mg 12 hr tablet Take 1 tablet (500 mg total) by mouth 2 (two) times a day 3/7/23  Yes APOLLO Cary   sitaGLIPtin (JANUVIA) 100 mg tablet Take 100 mg by mouth daily   Yes Historical Provider, MD   sulfamethoxazole-trimethoprim (BACTRIM DS) 800-160 mg per tablet Take 1 tablet by mouth every 12 (twelve) hours for 10 days 6/8/23 6/18/23 Yes Yumiko Valentine DPM   Blood Glucose Monitoring Suppl (OneTouch Verio Reflect) w/Device KIT Check blood sugars twice daily  Please substitute with appropriate alternative as covered by patient's insurance  Dx: E11 65 2/22/22   Marchelle Kocher, DO   cadexomer iodine (IODOSORB) 0 9 % gel Apply 1 application topically daily as needed for wound care 1/18/23   Sabiha Davila DPM   glucose blood (OneTouch Verio) test strip Check blood sugars twice daily  Please substitute with appropriate alternative as covered by patient's insurance   Dx: E11 65 9/13/22   Marchelle Kocher, DO   Insulin Glargine Solostar (Lantus SoloStar) 100 UNIT/ML SOPN Inject 0 22 mL (22 Units total) under the skin every evening 3/17/23 9/13/23  Juan Sneed, DO   Insulin Pen Needle 32G X 4 MM MISC Use every evening 7/22/22   Juan Sneed DO   nitroglycerin (NITROSTAT) 0 4 mg SL tablet Place 1 tablet (0 4 mg total) under the tongue every 5 (five) minutes as needed for chest pain 3/7/23   APOLLO Tinajero   OneTouch Delica Lancets 94V MISC Check blood sugars twice daily  Please substitute with appropriate alternative as covered by patient's insurance   Dx: E11 65 2/22/22   Juan Sneed DO     Current Facility-Administered Medications   Medication Dose Route Frequency Provider Last Rate Last Admin   • acetaminophen (TYLENOL) tablet 650 mg  650 mg Oral Q6H PRN [place] Spinnyasia, DO   650 mg at 06/17/23 9637   • aspirin chewable tablet 81 mg  81 mg Oral Daily [place] Spina, DO   81 mg at 06/17/23 3118   • atorvastatin (LIPITOR) tablet 40 mg  40 mg Oral Daily With Dinner Reymundo Wade DO       • clopidogrel (PLAVIX) tablet 75 mg  75 mg Oral Daily Reymundo Spina, DO   75 mg at 06/17/23 0847   • furosemide (LASIX) injection 40 mg  40 mg Intravenous BID Reymundo Wade, DO   40 mg at 06/17/23 0846   • heparin (porcine) subcutaneous injection 5,000 Units  5,000 Units Subcutaneous [facility] Reymundo Spina, DO   5,000 Units at 06/17/23 7212   • insulin glargine (LANTUS) subcutaneous injection 10 Units 0 1 mL  10 Units Subcutaneous HS Reymundo Wade DO       • insulin lispro (HumaLOG) 100 units/mL subcutaneous injection 1-5 Units  1-5 Units Subcutaneous TID AC Reymundo Wade DO       • insulin lispro (HumaLOG) 100 units/mL subcutaneous injection 1-5 Units  1-5 Units Subcutaneous HS Reymundo Wade DO       • isosorbide mononitrate (IMDUR) 24 hr tablet 60 mg  60 mg Oral Daily Reymundo Wade DO       • metoprolol tartrate (LOPRESSOR) tablet 25 mg  25 mg Oral Q12H [address] APOLLO Wilks       • ranolazine (RANEXA) 12 hr tablet 500 mg  500 mg Oral BID [place] Spina, DO   500 mg at "06/17/23 0847     Allergies   Allergen Reactions   • Lisinopril Rash and Lip Swelling       Review of systems  CONSTITUTIONAL:  As per hpi  HEENT:  Eyes:  No visual loss, blurred vision, double vision or yellow sclerae  Ears, Nose, Throat:  No hearing loss, sneezing, congestion, runny nose or sore throat  SKIN:  No rash or itching  CARDIOVASCULAR:  As per hpi  RESPIRATORY:  As per hpi  GASTROINTESTINAL:  No anorexia, nausea, vomiting or diarrhea  No abdominal pain or blood  GENITOURINARY:  Burning on urination  No flank pain  NEUROLOGICAL:  No headache, dizziness, syncope, paralysis, ataxia, numbness or tingling in the extremities  No change in bowel or bladder control  MUSCULOSKELETAL:  No muscle, back pain, joint pain or stiffness  HEMATOLOGIC:  No anemia, bleeding or bruising  LYMPHATICS:  No enlarged nodes  No history of splenectomy  PSYCHIATRIC:  No active suicidal or homicidal ideation  ENDOCRINOLOGIC:  No reports of sweating, cold or heat intolerance  No polyuria or polydipsia  ALLERGIES:  No history of asthma, hives, eczema or rhinitis  More than 10 systems reviewed and otherwise noncontributory  Objective   Vitals: Blood pressure 152/64, pulse 91, temperature 98 °F (36 7 °C), resp  rate 19, height 6' 2\" (1 88 m), weight 112 kg (246 lb 14 6 oz), SpO2 91 %  Blood pressure 152/64, pulse 91, temperature 98 °F (36 7 °C), resp  rate 19, height 6' 2\" (1 88 m), weight 112 kg (246 lb 14 6 oz), SpO2 91 %  Body mass index is 31 7 kg/m²    BP Readings from Last 3 Encounters:   06/17/23 152/64   06/15/23 122/62   06/08/23 120/60     Orthostatic Blood Pressures    Flowsheet Row Most Recent Value   Blood Pressure 152/64 filed at 06/17/2023 1035   Patient Position - Orthostatic VS Lying filed at 06/16/2023 2030          Intake/Output Summary (Last 24 hours) at 6/17/2023 1348  Last data filed at 6/17/2023 1101  Gross per 24 hour   Intake 710 ml   Output 3280 ml   Net -2570 ml     Invasive Devices     " Peripheral Intravenous Line  Duration           Peripheral IV 06/16/23 Right;Ventral (anterior) Forearm <1 day                Physical Exam  Constitutional:       General: He is not in acute distress  Appearance: He is well-developed  He is not diaphoretic  HENT:      Head: Normocephalic and atraumatic  Right Ear: External ear normal       Left Ear: External ear normal       Nose: Nose normal       Mouth/Throat:      Pharynx: No oropharyngeal exudate  Eyes:      General: No scleral icterus  Right eye: No discharge  Left eye: No discharge  Conjunctiva/sclera: Conjunctivae normal       Pupils: Pupils are equal, round, and reactive to light  Neck:      Thyroid: No thyromegaly  Vascular: No JVD  Trachea: No tracheal deviation  Cardiovascular:      Rate and Rhythm: Normal rate and regular rhythm  Pulses: Intact distal pulses  Heart sounds: Murmur heard  No friction rub  Gallop present  Pulmonary:      Effort: No respiratory distress  Breath sounds: No stridor  No wheezing or rales  Chest:      Chest wall: No tenderness  Abdominal:      General: Bowel sounds are normal  There is distension  Palpations: Abdomen is soft  There is no mass  Tenderness: There is no abdominal tenderness  There is no guarding or rebound  Genitourinary:     Comments: No CVA tenderness  Musculoskeletal:         General: Deformity present  No tenderness  Cervical back: Normal range of motion  Right lower leg: Edema present  Skin:     General: Skin is warm and dry  Findings: No erythema or rash  Neurological:      Mental Status: He is alert and oriented to person, place, and time  Motor: No abnormal muscle tone  Deep Tendon Reflexes: Reflexes normal    Psychiatric:         Behavior: Behavior normal          Thought Content: Thought content normal          Judgment: Judgment normal          Lab Results:  I Have Reviewed All Lab Data "Below:  Results from last 7 days   Lab Units 06/17/23  0519   WBC Thousand/uL 10 71*   HEMOGLOBIN g/dL 11 9*   HEMATOCRIT % 37 1   PLATELETS Thousands/uL 213   NEUTROS PCT % 79*   LYMPHS PCT % 11*   MONOS PCT % 9   EOS PCT % 1     Results from last 7 days   Lab Units 06/17/23  0519   POTASSIUM mmol/L 4 0   CHLORIDE mmol/L 103   CO2 mmol/L 25   BUN mg/dL 45*   CREATININE mg/dL 2 25*   CALCIUM mg/dL 9 0   ALK PHOS U/L 34   ALT U/L 13   AST U/L 14     Troponins:       CBC with diff:   Results from last 7 days   Lab Units 06/17/23  0519 06/16/23  1803   WBC Thousand/uL 10 71* 10 46*   HEMOGLOBIN g/dL 11 9* 11 9*   HEMATOCRIT % 37 1 36 8   MCV fL 85 85   PLATELETS Thousands/uL 213 226   RBC Million/uL 4 36 4 31   MCH pg 27 3 27 6   MCHC g/dL 32 1 32 3   RDW % 13 8 14 0   MPV fL 11 3 11 3   NRBC AUTO /100 WBCs 0 0       CMP:   Results from last 7 days   Lab Units 06/17/23  0519 06/16/23  1803   SODIUM mmol/L 136 133*   POTASSIUM mmol/L 4 0 4 5   CHLORIDE mmol/L 103 101   CO2 mmol/L 25 24   ANION GAP mmol/L 8 8   BUN mg/dL 45* 43*   CREATININE mg/dL 2 25* 2 23*   CALCIUM mg/dL 9 0 8 9   AST U/L 14 19   ALT U/L 13 18   ALK PHOS U/L 34 36   TOTAL PROTEIN g/dL 7 1 7 6   ALBUMIN g/dL 3 7 3 8   TOTAL BILIRUBIN mg/dL 0 69 0 69   EGFR ml/min/1 73sq m 26 26   GLUCOSE RANDOM mg/dL 83 79     Magnesium:   Results from last 7 days   Lab Units 06/17/23  0519 06/16/23  1803   MAGNESIUM mg/dL 2 2 2 2       NT-proBNP: No results for input(s): \"NTBNP\" in the last 72 hours       Coags:   Results from last 7 days   Lab Units 06/17/23  0519 06/16/23  1803   PTT seconds 34 33   INR  1 26* 1 20*       Troponins:       Lipid Profile:                              Lab Results   Component Value Date    CHOLESTEROL 114 03/09/2023    HDL 39 (L) 03/09/2023    LDLCALC 56 03/09/2023    TRIG 93 03/09/2023       TSH:    Results from last 7 days   Lab Units 06/17/23  0519   TSH 3RD GENERATON uIU/mL 1 536       Hgb A1c:       Imaging: I have personally " "reviewed pertinent films in PACS    Cardiac testing:     EKG: reviewed    Counseling / Coordination of Care Time: 45 minutes  Greater than 50% of total time spent on patient counseling and coordination of care  Code Status: Level 3 - DNAR and DNI  Collaboration of Care: Were Recommendations Directly Discussed with Primary Treatment Team? - Yes     Stan Dhaliwal MD [facility]    \"This note has been constructed using a voice recognition system  Therefore there may be syntax, spelling, and/or grammatical errors  Please call if you have any questions   \"    "

## 2023-06-17 NOTE — ASSESSMENT & PLAN NOTE
Wt Readings from Last 3 Encounters:   06/17/23 112 kg (246 lb 14 6 oz)   06/15/23 114 kg (252 lb)   06/08/23 113 kg (250 lb)     · Shortness of breath, abdominal distention, lower extremity edema  · BNP elevated, CT chest without contrast with evidence of pulmonary edema  · EKG sinus, rate 79, first-degree AV block with frequent PVCs  · Initial troponin negative  · Echo in July 2022 with LV EF 50-55%  Diastolic function mildly abnormal, consistent with grade 1 relaxation  Mild aortic stenosis  Aortic root mildly dilated  · Noted IVY on CKD  · Given IV Lasix 40 mg in the ER  · Not on any PTA Lasix or Bumex, does appear patient is on PTA HCTZ  · Echo 6/17:  LVEF 56%, reduced systolic function with grade 2 diastolic dysfunction with mild to moderate AV stenosis   · Patient having increased wob requiring increase o2  currently on 3-4 L NC  improved with lasix   · Responding well to lasix down 5 lbs since yesterday  · Continue with IV lasix 40 mg BID with close renal monitoring    · Monitor I/O and standing weights  · See IVY on CKD section  · Supplemental O2 as needed  · Cardiology following

## 2023-06-17 NOTE — ASSESSMENT & PLAN NOTE
Lab Results   Component Value Date    HGBA1C 6 3 (H) 06/01/2023       Recent Labs     06/16/23  2152 06/16/23  2233 06/17/23  0659 06/17/23  1124   POCGLU 66 89 91 176*       Blood Sugar Average: Last 72 hrs:  · (P) 823 [id number] on metabolic panel 79  ·  Decrease PTA glargine to 10 units nightly instead of 22 units nightly  · SSI with meals  · Hold oral hypoglycemic agents

## 2023-06-17 NOTE — H&P
[address]  H&P  Name: Erika Mendoza [de-identified] y o  male I MRN: [id number]  Unit/Bed#: [address] [facility] I Date of Admission: 6/16/2023   Date of Service: 6/16/2023 I Hospital Day: 0      Assessment/Plan   * Acute on chronic diastolic heart failure (HCC)  Assessment & Plan  Wt Readings from Last 3 Encounters:   06/16/23 114 kg (252 lb)   06/15/23 114 kg (252 lb)   06/08/23 113 kg (250 lb)     · Shortness of breath, abdominal distention, lower extremity edema  · BNP elevated, CT chest without contrast with evidence of pulmonary edema  · EKG sinus, rate 79, first-degree AV block with frequent PVCs  · Initial troponin negative  · History of CABG x3  · History of diastolic heart failure as well as mild aortic stenosis  · Echo in July 2022 with LV EF 50-55%  Diastolic function mildly abnormal, consistent with grade 1 relaxation  Mild aortic stenosis  Aortic root mildly dilated  · Requiring 2 L O2 to maintain sats greater than 90% in the ER  · Noted IVY on CKD  · Given IV Lasix 40 mg in the ER  · Not on any PTA Lasix or Bumex, does appear patient is on PTA HCTZ  · Admit to medicine on telemetry  We will need to monitor patient's urine output overnight after he was given IV Lasix 40 mg in the ER as well as his renal function in the morning  For now, we will schedule Lasix IV 40 mg twice daily to treat for heart failure exacerbation with deference to patient's breathing status  We will repeat an echocardiogram   Consult CV team   We will hold patient's PTA antihypertensive agents besides his metoprolol as well as his antianginal agents to avoid provoking hypotension in the setting of aggressive diuresis    · See IVY on CKD section  · Supplemental O2 as needed        Acute kidney injury superimposed on chronic kidney disease [facility])  Assessment & Plan  Lab Results   Component Value Date    EGFR 26 06/16/2023    EGFR 39 06/01/2023    EGFR 55 05/11/2023    CREATININE 2 23 (H) 06/16/2023    CREATININE 1 63 (H) "06/01/2023    CREATININE 1 23 05/11/2023   · Creatinine 2 2, baseline appears to be 1 5  · History of CKD stage II and follows with Dr Naga Palacio outpatient; per review of chart appears the etiology of CKD suspected to be hypertensive nephrosclerosis and diabetic kidney disease  · History of prostate cancer and has urethral device that assists with the opening of the sphincter  · Admitted with acute on chronic diastolic heart failure as well as shortness of breath, acute hypoxemic respiratory failure requiring 2 L to maintain sats greater than 90%, elevated BNP and evidence of pulmonary edema  · Given IV Lasix 40 mg in the ER  · Given patient's respiratory failure/diastolic heart failure, we will continue IV Lasix 40 mg twice daily with deference to patient's breathing status  We will need to monitor patient's renal status closely with daily metabolic panel  We will order a CT abdomen pelvis without contrast to rule out any structural abnormalities and get a better view of patient's underlying urethral device in case patient is retaining urine and would need urinary catheter; as such, urinary retention protocol placed  If patient is retaining urine, we will likely need a urology consultation as patient reports he has never had a urinary catheter put in and thinks he was told by his oncologist to tell people he had this procedure prior to any catheter being placed  · Order urine electrolytes to calculate FENa  Order urine microalbumin creatinine ratio  · Consult nephrology  ·     Diabetic ulcer of right midfoot associated with type 2 diabetes mellitus, limited to breakdown of skin [facility])  Assessment & Plan  Lab Results   Component Value Date    HGBA1C 6 3 (H) 06/01/2023       No results for input(s): \"POCGLU\" in the last 72 hours      Blood Sugar Average: Last 72 hrs:  · Has a small diabetic ulcer on the right side of the foot which patient reports he saw his podiatrist for about a week and a half ago and was put on " "Bactrim for a reported infection  · On exam, patient does endorse some tenderness of the area but it does not appear to be acutely infected with surrounding cellulitis; as patient is admitted with an IVY on CKD and he has received almost the full 10 days of the Bactrim for which she was prescribed (started on 6/6), hold Bactrim  · Consider podiatry consultation in the morning    Mild aortic stenosis  Assessment & Plan  · Echo in July 2022 with LVEF 62-90%, grade 1 diastolic dysfunction as well as mild aortic stenosis and mildly dilated aortic root  · Repeat 2D echo to further evaluate    Type 2 diabetes mellitus with diabetic polyneuropathy, with long-term current use of insulin ([address] )  Assessment & Plan  Lab Results   Component Value Date    HGBA1C 6 3 (H) 06/01/2023       No results for input(s): \"POCGLU\" in the last 72 hours      Blood Sugar Average: Last 72 hrs:  · Glucose on metabolic panel 79  · As such, we will decrease PTA glargine to 10 units nightly instead of 22 units nightly; put on sliding scale insulin with meals; hold PTA oral hypoglycemic agents    H/O prostate cancer  Assessment & Plan  · History of prostate cancer for which patient reports undergoing what sounds like a prostatectomy at [facility] in 2014 or 2015  · Reports he has a \"urethral device\" that assists with opening and closing of the urethra as a result of the procedure and on exam patient has a device in the right side of the scrotum which she reports he presses to relieve himself  · May need urology consultation if patient is retaining urine in the setting of IVY on CKD given his unclear urethral anatomy and possible need for urethral catheterization    S/P AAA repair  Assessment & Plan  · History of AAA repair; denies any abdominal pain or chest pain  · Follows with vascular team outpatient    Hx of CABG  Assessment & Plan  · Follows with Dr Dax Peterson outpatient and saw about a week and a half ago; denies any chest pain today or " abdominal pain  · Per Dr Valverde last note history of triple-vessel bypass with last catheterization in November 2022 which showed only a patent LIMA to LAD  Proximal % occluded, proximal % occluded  Vein graft % occluded  Obtuse marginal 200% occluded  Vein graft to OM T2, 100% occluded  AV groove circumflex widely patent which feeds posterior lateral breaks  Patient has grade 1 to grade 2 collaterals to right circulation from the left circulation  EF is 50%  · Per last cardiology note patient is on aspirin and Plavix for 6 months and then will only be on aspirin  · Continue PTA aspirin, Plavix, statin  · Continuing PTA metoprolol  We are holding patient's other antihypertensive agents as we are aggressively diuresing patient to avoid provoking hypotension  If patient has elevated blood pressures, we will systematically add them back on  · CV team consulted             VTE Prophylaxis: Heparin  / sequential compression device   Code Status: Level 3 - DNAR and DNI discussed with patient daughters at bedside      Anticipated Length of Stay:  Patient will be admitted on an Inpatient basis with an anticipated length of stay of  > 2 midnights  Justification for Hospital Stay: Please see detailed plans noted above  Chief Complaint:     Shortness of breath  History of Present Illness:  Jasiel Fay is a [de-identified] y o  male who has past medical history significant for CABG x3, AAA repair, prostatectomy for prostate cancer, CKD, diabetes, hypertension, diabetic foot ulcer who presented to [facility] ER on the evening of 6/16 accompanied by his daughters for worsening shortness of breath over the last 2 days  Patient reports that he has been feeling more short of breath over the last several days and patient's daughters at bedside reports that they came over to his house to visit him and he reportedly looked like he was struggling to breathe which prompted them to bring him to the ER  Patient does additionally report lower extremity swelling as well as abdominal distention  Patient denies any chest pain or abdominal pain  Patient denies any back pain  Patient does report that his wife recently passed away about a month ago and it has been a tough emotional month for him  He denies any significant changes in his diet over the last month  He reports that he is on hydrochlorothiazide but is not on or never has been on any PTA Lasix or stronger diuretics  Patient does report that his breathing feels improved after being given supplemental O2 in the ER and that he also feels the need to urinate currently as he was given IV Lasix in the ER  Patient does additionally report a history of a right diabetic foot ulcer for which she saw his podiatrist about 10 days ago  He reports he was put on Bactrim for possible cellulitis  He reports that the foot pain has improved as well as the erythema  Patient denies any current fevers  Patient denies any chills or nausea or vomiting        Review of Systems:    Constitutional:  Denies fever or chills   Eyes:  Denies change in visual acuity   HENT:  Denies nasal congestion or sore throat   Respiratory: Positive for shortness of breath  Cardiovascular:  Denies chest pain or edema   GI:  Denies abdominal pain or bloody stools  :  Denies dysuria   Musculoskeletal:  Denies back pain or joint pain   Integument:  Denies rash   Neurologic:  Denies headache or sensory changes   Endocrine:  Denies polyuria or polydipsia   Lymphatic:  Denies swollen glands   Psychiatric:  Denies depression or anxiety     Past Medical and Surgical History:   Past Medical History:   Diagnosis Date   • CAD (coronary artery disease)    • Cancer ([facility] )     prostate   • CHF (congestive heart failure) ([facility] )    • Diabetes mellitus ([facility] )    • Myocardial infarction [facility])      Past Surgical History:   Procedure Laterality Date   • ADENOIDECTOMY     • CARDIAC CATHETERIZATION Left 10/19/2022 Procedure: Cardiac Left Heart Cath;  Surgeon: Wili Singh MD;  Location: [facility] CARDIAC CATH LAB; Service: Cardiology   • CARDIAC SURGERY  2002    3 cardiac bypass then angioplasty 7/2020   • CHOLECYSTECTOMY     • CORONARY ARTERY BYPASS GRAFT     • PROSTATE SURGERY     • TONSILLECTOMY         Meds/Allergies:  Medications Prior to Admission   Medication Sig Dispense Refill Last Dose   • amLODIPine (NORVASC) 10 mg tablet TAKE 1 TABLET BY MOUTH  DAILY 90 tablet 3    • aspirin 81 mg chewable tablet Chew 81 mg daily      • atorvastatin (LIPITOR) 40 mg tablet TAKE 1 TABLET BY MOUTH  DAILY 90 tablet 3    • Blood Glucose Monitoring Suppl (OneTouch Verio Reflect) w/Device KIT Check blood sugars twice daily  Please substitute with appropriate alternative as covered by patient's insurance  Dx: E11 65 1 kit 0    • cadexomer iodine (IODOSORB) 0 9 % gel Apply 1 application topically daily as needed for wound care 10 g 0    • ciclopirox (LOPROX) 0 77 % cream Apply topically 2 (two) times a day for 20 days 90 g 2    • cloNIDine (CATAPRES) 0 1 mg tablet take 1 tablet by mouth every 12 hours 180 tablet 1    • clopidogrel (PLAVIX) 75 mg tablet Take 1 tablet (75 mg total) by mouth daily 90 tablet 1    • fenofibrate 160 MG tablet TAKE 1 TABLET BY MOUTH  DAILY 90 tablet 3    • gabapentin (NEURONTIN) 600 MG tablet TAKE 1 TABLET BY MOUTH  TWICE DAILY 180 tablet 3    • glimepiride (AMARYL) 2 mg tablet Take 1 tablet (2 mg total) by mouth 2 (two) times a day 180 tablet 1    • glucose blood (OneTouch Verio) test strip Check blood sugars twice daily  Please substitute with appropriate alternative as covered by patient's insurance   Dx: E11 65 200 each 3    • hydrochlorothiazide (HYDRODIURIL) 12 5 mg tablet TAKE 1 TABLET BY MOUTH DAILY 30 tablet 11    • Insulin Glargine Solostar (Lantus SoloStar) 100 UNIT/ML SOPN Inject 0 22 mL (22 Units total) under the skin every evening 15 mL 3    • Insulin Pen Needle 32G X 4 MM MISC Use every evening 100 each 5    • isosorbide mononitrate (IMDUR) 60 mg 24 hr tablet Take 1 tablet (60 mg total) by mouth daily 90 tablet 2    • Jardiance 25 MG TABS TAKE 1 TABLET BY MOUTH IN  THE MORNING 90 tablet 3    • losartan (COZAAR) 100 MG tablet TAKE 1 TABLET BY MOUTH  DAILY 90 tablet 3    • metFORMIN (GLUCOPHAGE) 500 mg tablet TAKE 1 TABLET BY MOUTH  TWICE DAILY WITH MEALS 180 tablet 3    • metoprolol succinate (TOPROL-XL) 50 mg 24 hr tablet TAKE 1 TABLET BY MOUTH  DAILY 90 tablet 3    • Multiple Vitamins-Minerals (MULTIVITAMIN MEN 50+ PO) Take by mouth daily      • nitroglycerin (NITROSTAT) 0 4 mg SL tablet Place 1 tablet (0 4 mg total) under the tongue every 5 (five) minutes as needed for chest pain 30 tablet 3    • Omega-3 Fatty Acids (fish oil) 1,000 mg Take 4,000 mg by mouth daily       • OneTouch Delica Lancets 16V MISC Check blood sugars twice daily  Please substitute with appropriate alternative as covered by patient's insurance  Dx: E11 65 200 each 3    • ranolazine (RANEXA) 500 mg 12 hr tablet Take 1 tablet (500 mg total) by mouth 2 (two) times a day 60 tablet 3    • sitaGLIPtin (JANUVIA) 100 mg tablet Take 100 mg by mouth daily      • sulfamethoxazole-trimethoprim (BACTRIM DS) 800-160 mg per tablet Take 1 tablet by mouth every 12 (twelve) hours for 10 days 20 tablet 0        Allergies:    Allergies   Allergen Reactions   • Lisinopril Rash and Lip Swelling       History:  Marital Status: /Civil Union     Substance Use History:   Social History     Substance and Sexual Activity   Alcohol Use Yes   • Alcohol/week: 14 0 standard drinks of alcohol   • Types: 14 Cans of beer per week    Comment: 1 -2 daily     Social History     Tobacco Use   Smoking Status Former   • Packs/day: 1 50   • Years: 33 00   • Total pack years: 49 50   • Types: Cigarettes   • Start date:    • Quit date:    • Years since quittin 4   Smokeless Tobacco Never     Social History     Substance and Sexual Activity   Drug Use Never "      Family History:  Family History   Problem Relation Age of Onset   • Diabetes Mother    • Alcohol abuse Father    • Mental illness Neg Hx        Physical Exam:     Vitals:   Blood Pressure: 137/58 (06/16/23 2113)  Pulse: (!) 35 (06/16/23 2113)  Temperature: 98 8 °F (37 1 °C) (06/16/23 2113)  Respirations: (!) 23 (06/16/23 2113)  Height: 6' 2\" (188 cm) (06/16/23 1654)  Weight - Scale: 114 kg (252 lb) (06/16/23 1654)  SpO2: 93 % (06/16/23 2113)    Constitutional:  A&Ox4  Eyes:  EOMI, No scleral icterus   HENT:   oropharynx moist, external ears normal, external nose normal   Respiratory:  Diffuse crackles, tachypnea  Cardiovascular:  PVCs noted on palpation of radial pulse, audible systolic murmur  GI:  Soft, Moderately distended, no tenderness   :  Scrotum with urethral pump device on R side   Musculoskeletal:  2+ bilateral pitting edema, R plantar diabetic foot ulcer without surrounding erythema or fluctuance  Integument:  no jaundice  Neurologic:  Alert &awake, communicative, CN 2-12 normal,  no focal deficits noted   Psychiatric:  Speech and behavior appropriate       Lab Results: I have personally reviewed pertinent reports  Results from last 7 days   Lab Units 06/16/23  1803   WBC Thousand/uL 10 46*   HEMOGLOBIN g/dL 11 9*   HEMATOCRIT % 36 8   PLATELETS Thousands/uL 226   NEUTROS PCT % 79*   LYMPHS PCT % 11*   MONOS PCT % 9   EOS PCT % 1     Results from last 7 days   Lab Units 06/16/23  1803   POTASSIUM mmol/L 4 5   CHLORIDE mmol/L 101   CO2 mmol/L 24   BUN mg/dL 43*   CREATININE mg/dL 2 23*   CALCIUM mg/dL 8 9   ALK PHOS U/L 36   ALT U/L 18   AST U/L 19     Results from last 7 days   Lab Units 06/16/23  1803   INR  1 20*         Imaging: I have personally reviewed pertinent reports  CT chest without contrast    Result Date: 6/16/2023  Narrative: CT CHEST WITHOUT IV CONTRAST INDICATION:   Abnormal xray - interstitial infiltrates sob  COMPARISON: Chest radiograph from today   Abdomen CT " 5/11/2023  TECHNIQUE: Chest CT without intravenous contrast   Axial, sagittal, coronal 2D reformats and coronal MIPS from source data  Radiation dose length product (DLP):  737 83 mGy-cm   Radiation dose exposure minimized using iterative reconstruction and automated exposure control  FINDINGS: LUNGS: Moderate septal thickening and moderate multifocal groundglass opacity due to interstitial and alveolar edema  AIRWAYS: No significant filling defects  PLEURA:  Unremarkable  HEART/GREAT VESSELS: Moderate cardiomegaly  Severe coronary artery calcification indicating atherosclerotic heart disease  CABG  MEDIASTINUM AND BILLY:  Unremarkable  CHEST WALL AND LOWER NECK: Unremarkable  UPPER ABDOMEN: Redemonstration of moderate elevation of the right hemidiaphragm  Nonobstructing right renal calcification  Abdominal aortic stent graft  OSSEOUS STRUCTURES: Moderate degenerative disease in the spine  Impression: Moderate septal thickening and moderate multifocal groundglass opacity due to interstitial and alveolar edema  Workstation performed: [id number]       Total time for visit, including counseling/coordination of care: 60 minutes  Greater than 50% of this total time spent on direct patient counseling and coorination of care  Epic Records Reviewed as well as Records in Care Everywhere    ** Please Note: Dragon 360 Dictation voice to text software was used in the creation of this document   **

## 2023-06-17 NOTE — PROGRESS NOTES
[facility]  Progress Note  Name: Walker Gonzalez  MRN: [id number]  Unit/Bed#: [address]40 [address] I Date of Admission: 6/16/2023   Date of Service: 6/17/2023 I Hospital Day: 1    Assessment/Plan   * Acute on chronic diastolic heart failure (HCC)  Assessment & Plan  Wt Readings from Last 3 Encounters:   06/17/23 112 kg (246 lb 14 6 oz)   06/15/23 114 kg (252 lb)   06/08/23 113 kg (250 lb)     · Shortness of breath, abdominal distention, lower extremity edema  · BNP elevated, CT chest without contrast with evidence of pulmonary edema  · EKG sinus, rate 79, first-degree AV block with frequent PVCs  · Initial troponin negative  · Echo in July 2022 with LV EF 50-55%  Diastolic function mildly abnormal, consistent with grade 1 relaxation  Mild aortic stenosis  Aortic root mildly dilated  · Noted IVY on CKD  · Given IV Lasix 40 mg in the ER  · Not on any PTA Lasix or Bumex, does appear patient is on PTA HCTZ  · Echo 6/17:  LVEF 15%, reduced systolic function with grade 2 diastolic dysfunction with mild to moderate AV stenosis   · Patient having increased wob requiring increase o2  currently on 3-4 L NC  improved with lasix   · Responding well to lasix down 5 lbs since yesterday  · Continue with IV lasix 40 mg BID with close renal monitoring    · Monitor I/O and standing weights  · See IVY on CKD section  · Supplemental O2 as needed  · Cardiology following        Frequent PVCs  Assessment & Plan  · Bigeminy noted on telemetry overnight  · EKG obtained showing 1st degree AVB with frequent PVC's  · Mag 2 2, potassium 4 0:   · TSH WNL  · Cardiology rec change metoprolol succinate to metoprolol tartrate 12 5 mg BID  · Obtain bmp and mag in am   · Monitor on telemetry       Acute kidney injury superimposed on chronic kidney disease [facility])  Assessment & Plan  Lab Results   Component Value Date    EGFR 26 06/17/2023    EGFR 26 06/16/2023    EGFR 39 06/01/2023    CREATININE 2 25 (H) 06/17/2023    CREATININE 2 23 (H) 06/16/2023    CREATININE 1 63 (H) 06/01/2023   · Creatinine 2 2 POA, baseline appears to be 1 5  · History of CKD stage II and follows with Dr Mirna Roth outpatient; per review of chart appears the etiology of CKD suspected to be hypertensive nephrosclerosis and diabetic kidney disease  · History of prostate cancer and has urethral device that assists with the opening of the sphincter  · CT abdomen pelvis without contrast to rule out any structural abnormalities and get a better view of patient's underlying urethral device in case patient is retaining urine and would need urinary catheter; imaging pending  · Follow up on urine electrolytes to calculate FENa  · Urine Albumin/Creatinine: normal  · Cr 2 25 today continue to monitor   · Obtain bladder scan   · Avoid hypotension   · Nephrology following    Diabetic ulcer of right midfoot associated with type 2 diabetes mellitus, limited to breakdown of skin [facility])  Assessment & Plan  Lab Results   Component Value Date    HGBA1C 6 3 (H) 06/01/2023       Recent Labs     06/16/23  2152 06/16/23  2233 06/17/23  0659 06/17/23  1124   POCGLU 66 89 91 176*       Blood Sugar Average: Last 72 hrs:  · (P) 105 5   · Has a small diabetic ulcer on the right side of the foot which patient reports he saw his podiatrist for about a week and a half ago and was put on Bactrim for a reported infection started on 6/6 for 10 days  · Does not appear acutely infected with surrounding cellulitis  · Hold bactrim in setting of IVY on CKD   Patient did no receive full abx course   · Consider podiatry consultation    Mild aortic stenosis  Assessment & Plan  · Echo in July 2022 with LVEF 85-71%, grade 1 diastolic dysfunction as well as mild aortic stenosis and mildly dilated aortic root  · Echo 6/17:  LVEF 72%, reduced systolic function with grade 2 diastolic dysfunction with mild to moderate AV stenosis     Type 2 diabetes mellitus with diabetic polyneuropathy, with long-term current use "of insulin [facility])  Assessment & Plan  Lab Results   Component Value Date    HGBA1C 6 3 (H) 06/01/2023       Recent Labs     06/16/23  2152 06/16/23  2233 06/17/23  0659 06/17/23  1124   POCGLU 66 89 91 176*       Blood Sugar Average: Last 72 hrs:  · (P) 724 [id number] on metabolic panel 79  ·  Decrease PTA glargine to 10 units nightly instead of 22 units nightly  · SSI with meals  · Hold oral hypoglycemic agents    H/O prostate cancer  Assessment & Plan  · History of prostate cancer for which patient reports undergoing what sounds like a prostatectomy at [facility] in 2014 or 2015  · Has a \"urethral device\" that assists with opening and closing of the urethra as a result of the procedure and on exam patient has a device in the right side of the scrotum which she reports he presses to relieve himself  · May need urology consultation if patient is retaining urine in the setting of IVY on CKD given his unclear urethral anatomy and possible need for urethral catheterization  · Obtain bladder scan     S/P AAA repair  Assessment & Plan  · History of AAA repair; denies any abdominal pain or chest pain  · Follows with vascular team outpatient    Hx of CABG  Assessment & Plan  · Follows with Dr Kirit Bernal outpatient and saw about a week and a half ago; denies any chest pain today or abdominal pain  · Per Dr Naomie Medel last note history of triple-vessel bypass with last catheterization in November 2022 which showed only a patent LIMA to LAD  Proximal % occluded, proximal % occluded  Vein graft % occluded  Obtuse marginal 200% occluded  Vein graft to OM T2, 100% occluded  AV groove circumflex widely patent which feeds posterior lateral breaks  Patient has grade 1 to grade 2 collaterals to right circulation from the left circulation  EF is 50%    · Per last cardiology note patient is on aspirin and Plavix for 6 months and then will only be on aspirin  · Continue PTA aspirin, Plavix, statin  · Continuing " PTA metoprolol  · We are holding patient's other antihypertensive agents as we are aggressively diuresing patient to avoid provoking hypotension  · If patient has elevated blood pressures, we will systematically add them back on  · CV team consulted               VTE Pharmacologic Prophylaxis: VTE Score: 7 High Risk (Score >/= 5) - Pharmacological DVT Prophylaxis Ordered: heparin  Sequential Compression Devices Ordered  Patient Centered Rounds: I performed bedside rounds with nursing staff today  Discussions with Specialists or Other Care Team Provider: Multidisciplinary teams    Education and Discussions with Family / Patient: Updated  (daughter) at bedside  Total Time Spent on Date of Encounter in care of patient: 45 minutes This time was spent on one or more of the following: performing physical exam; counseling and coordination of care; obtaining or reviewing history; documenting in the medical record; reviewing/ordering tests, medications or procedures; communicating with other healthcare professionals and discussing with patient's family/caregivers  Current Length of Stay: 1 day(s)  Current Patient Status: Inpatient   Certification Statement: The patient will continue to require additional inpatient hospital stay due to IV diuresis, electrolyte, and renal monitoring   Discharge Plan: Anticipate discharge in 48 hrs to home  Code Status: Level 3 - DNAR and DNI    Subjective:   Patient evaluated at bedside with daughter present in room  Patient with increased work of breathing and sob on 3L NC appears acutely distressed  Patient states that he is having trouble taking deep breaths  Patient denies any cp, palpitations, nausea, vomiting, abd pain, light headedness, or dizziness at this time  Nursing at bed side obtained EKG and administered morning dose of IV lasix  Patient subsequently voided with good urine output  Patient notice improvement in breathing after   Vitals were otherwise stable  Objective:     Vitals:   Temp (24hrs), Av 8 °F (37 1 °C), Min:98 °F (36 7 °C), Max:99 8 °F (37 7 °C)    Temp:  [98 °F (36 7 °C)-99 8 °F (37 7 °C)] 98 °F (36 7 °C)  HR:  [35-91] 91  Resp:  [19-24] 19  BP: (137-175)/(58-90) 152/64  SpO2:  [90 %-94 %] 91 %  Body mass index is 31 7 kg/m²  Input and Output Summary (last 24 hours): Intake/Output Summary (Last 24 hours) at 2023 1344  Last data filed at 2023 1101  Gross per 24 hour   Intake 710 ml   Output 3280 ml   Net -2570 ml       Physical Exam:   Physical Exam  Constitutional:       General: He is in acute distress  HENT:      Head: Normocephalic and atraumatic  Cardiovascular:      Rate and Rhythm: Normal rate and regular rhythm  Extrasystoles are present  Heart sounds: Murmur heard  Pulmonary:      Effort: Tachypnea present  Breath sounds: Examination of the right-lower field reveals rales  Examination of the left-lower field reveals rales  Rales present  No wheezing  Comments: Rales in base of lungs L>R  Abdominal:      General: Bowel sounds are normal  There is distension  Palpations: Abdomen is soft  Tenderness: There is no abdominal tenderness  There is no guarding  Musculoskeletal:      Right lower le+ Pitting Edema present  Left lower le+ Pitting Edema present  Feet:      Comments: Diabetic wound present on R foot  Skin:     General: Skin is warm and dry  Neurological:      Mental Status: He is alert and oriented to person, place, and time     Psychiatric:         Mood and Affect: Mood normal          Behavior: Behavior normal        Additional Data:     Labs:  Results from last 7 days   Lab Units 23  0519   WBC Thousand/uL 10 71*   HEMOGLOBIN g/dL 11 9*   HEMATOCRIT % 37 1   PLATELETS Thousands/uL 213   NEUTROS PCT % 79*   LYMPHS PCT % 11*   MONOS PCT % 9   EOS PCT % 1     Results from last 7 days   Lab Units 23  0519   SODIUM mmol/L 136   POTASSIUM mmol/L 4  0   CHLORIDE mmol/L 103   CO2 mmol/L 25   BUN mg/dL 45*   CREATININE mg/dL 2 25*   ANION GAP mmol/L 8   CALCIUM mg/dL 9 0   ALBUMIN g/dL 3 7   TOTAL BILIRUBIN mg/dL 0 69   ALK PHOS U/L 34   ALT U/L 13   AST U/L 14   GLUCOSE RANDOM mg/dL 83     Results from last 7 days   Lab Units 06/17/23  0519   INR  1 26*     Results from last 7 days   Lab Units 06/17/23  1124 06/17/23  0659 06/16/23  2233 06/16/23  2152   POC GLUCOSE mg/dl 176* 91 89 66         Results from last 7 days   Lab Units 06/16/23  1803   LACTIC ACID mmol/L 1 0       Lines/Drains:  Invasive Devices     Peripheral Intravenous Line  Duration           Peripheral IV 06/16/23 Right;Ventral (anterior) Forearm <1 day                  Telemetry:  Telemetry Orders (From admission, onward)             24 Hour Telemetry Monitoring  Continuous x 24 Hours (Telem)        Question:  Reason for 24 Hour Telemetry  Answer:  Decompensated CHF- and any one of the following: continuous diuretic infusion or total diuretic dose >200 mg daily, associated electrolyte derangement (I e  K < 3 0), ionotropic drip (continuous infusion), hx of ventricular arrhythmia, or new EF < 35%                 Telemetry Reviewed: 1st degree AVB bigeminy   Indication for Continued Telemetry Use: Arrthymias requiring medical therapy             Imaging: Reviewed radiology reports from this admission including: chest xray and chest CT scan    Recent Cultures (last 7 days):   Results from last 7 days   Lab Units 06/16/23  1803   BLOOD CULTURE  Received in Microbiology Lab  Culture in Progress  Received in Microbiology Lab  Culture in Progress         Last 24 Hours Medication List:   Current Facility-Administered Medications   Medication Dose Route Frequency Provider Last Rate   • acetaminophen  650 mg Oral Q6H PRN Julia Crowe,      • aspirin  81 mg Oral Daily Julia Crowe, DO     • atorvastatin  40 mg Oral Daily With [address], DO     • clopidogrel  75 mg Oral Daily Jacqueline ALMANZA Donaldo Dutta, DO     • furosemide  40 mg Intravenous BID Pixandrew Darrian, DO     • heparin (porcine)  5,000 Units Subcutaneous [facility] Pixie Darrian, DO     • insulin glargine  10 Units Subcutaneous HS Pixie Darrian, DO     • insulin lispro  1-5 Units Subcutaneous TID AC Pixie Darrian, DO     • insulin lispro  1-5 Units Subcutaneous HS Pixandrew Darrian, DO     • isosorbide mononitrate  60 mg Oral Daily Kristie Darrian, DO     • metoprolol tartrate  25 mg Oral Q12H [address] APOLLO Squires     • ranolazine  500 mg Oral BID Kristie Darrian, DO          Today, Patient Was Seen By: APOLLO Squires    **Please Note: This note may have been constructed using a voice recognition system  **

## 2023-06-17 NOTE — ASSESSMENT & PLAN NOTE
"Lab Results   Component Value Date    HGBA1C 6 3 (H) 06/01/2023       No results for input(s): \"POCGLU\" in the last 72 hours      Blood Sugar Average: Last 72 hrs:  · Has a small diabetic ulcer on the right side of the foot which patient reports he saw his podiatrist for about a week and a half ago and was put on Bactrim for a reported infection  · On exam, patient does endorse some tenderness of the area but it does not appear to be acutely infected with surrounding cellulitis; as patient is admitted with an IVY on CKD and he has received almost the full 10 days of the Bactrim for which she was prescribed (started on 6/6), hold Bactrim  · Consider podiatry consultation in the morning  "

## 2023-06-17 NOTE — ASSESSMENT & PLAN NOTE
"· History of prostate cancer for which patient reports undergoing what sounds like a prostatectomy at [facility] in 2014 or 2015  · Has a \"urethral device\" that assists with opening and closing of the urethra as a result of the procedure and on exam patient has a device in the right side of the scrotum which she reports he presses to relieve himself  · May need urology consultation if patient is retaining urine in the setting of IVY on CKD given his unclear urethral anatomy and possible need for urethral catheterization  · Obtain bladder scan   "

## 2023-06-17 NOTE — ASSESSMENT & PLAN NOTE
Lab Results   Component Value Date    HGBA1C 6 3 (H) 06/01/2023       Recent Labs     06/16/23  2152 06/16/23  2233 06/17/23  0659 06/17/23  1124   POCGLU 66 89 91 176*       Blood Sugar Average: Last 72 hrs:  · (P) 105 5   · Has a small diabetic ulcer on the right side of the foot which patient reports he saw his podiatrist for about a week and a half ago and was put on Bactrim for a reported infection started on 6/6 for 10 days  · Does not appear acutely infected with surrounding cellulitis  · Hold bactrim in setting of IVY on CKD   Patient did no receive full abx course   · Consider podiatry consultation

## 2023-06-17 NOTE — ASSESSMENT & PLAN NOTE
· Follows with Dr Raymundo Parry outpatient and saw about a week and a half ago; denies any chest pain today or abdominal pain  · Per Dr Odilia Gutierrez last note history of triple-vessel bypass with last catheterization in November 2022 which showed only a patent LIMA to LAD  Proximal % occluded, proximal % occluded  Vein graft % occluded  Obtuse marginal 200% occluded  Vein graft to OM T2, 100% occluded  AV groove circumflex widely patent which feeds posterior lateral breaks  Patient has grade 1 to grade 2 collaterals to right circulation from the left circulation  EF is 50%  · Per last cardiology note patient is on aspirin and Plavix for 6 months and then will only be on aspirin  · Continue PTA aspirin, Plavix, statin  · Continuing PTA metoprolol  We are holding patient's other antihypertensive agents as we are aggressively diuresing patient to avoid provoking hypotension    If patient has elevated blood pressures, we will systematically add them back on  · CV team consulted

## 2023-06-18 LAB
ANION GAP SERPL CALCULATED.3IONS-SCNC: 10 MMOL/L (ref 4–13)
ATRIAL RATE: 79 BPM
BUN SERPL-MCNC: 42 MG/DL (ref 5–25)
CALCIUM SERPL-MCNC: 9.2 MG/DL (ref 8.4–10.2)
CHLORIDE SERPL-SCNC: 101 MMOL/L (ref 96–108)
CO2 SERPL-SCNC: 26 MMOL/L (ref 21–32)
CREAT SERPL-MCNC: 1.97 MG/DL (ref 0.6–1.3)
GFR SERPL CREATININE-BSD FRML MDRD: 31 ML/MIN/1.73SQ M
GLUCOSE SERPL-MCNC: 115 MG/DL (ref 65–140)
GLUCOSE SERPL-MCNC: 121 MG/DL (ref 65–140)
GLUCOSE SERPL-MCNC: 188 MG/DL (ref 65–140)
GLUCOSE SERPL-MCNC: 214 MG/DL (ref 65–140)
GLUCOSE SERPL-MCNC: 216 MG/DL (ref 65–140)
MAGNESIUM SERPL-MCNC: 2.3 MG/DL (ref 1.9–2.7)
P AXIS: 5 DEGREES
PHOSPHATE SERPL-MCNC: 2.6 MG/DL (ref 2.3–4.1)
POTASSIUM SERPL-SCNC: 3.7 MMOL/L (ref 3.5–5.3)
PR INTERVAL: 218 MS
PROCALCITONIN SERPL-MCNC: 0.15 NG/ML
QRS AXIS: -51 DEGREES
QRSD INTERVAL: 116 MS
QT INTERVAL: 432 MS
QTC INTERVAL: 495 MS
SODIUM SERPL-SCNC: 137 MMOL/L (ref 135–147)
T WAVE AXIS: 81 DEGREES
VENTRICULAR RATE: 79 BPM

## 2023-06-18 PROCEDURE — 80048 BASIC METABOLIC PNL TOTAL CA: CPT | Performed by: INTERNAL MEDICINE

## 2023-06-18 PROCEDURE — 92610 EVALUATE SWALLOWING FUNCTION: CPT

## 2023-06-18 PROCEDURE — 99232 SBSQ HOSP IP/OBS MODERATE 35: CPT | Performed by: INTERNAL MEDICINE

## 2023-06-18 PROCEDURE — 99232 SBSQ HOSP IP/OBS MODERATE 35: CPT | Performed by: NURSE PRACTITIONER

## 2023-06-18 PROCEDURE — 93010 ELECTROCARDIOGRAM REPORT: CPT | Performed by: INTERNAL MEDICINE

## 2023-06-18 PROCEDURE — 84100 ASSAY OF PHOSPHORUS: CPT | Performed by: INTERNAL MEDICINE

## 2023-06-18 PROCEDURE — 82948 REAGENT STRIP/BLOOD GLUCOSE: CPT

## 2023-06-18 PROCEDURE — 83735 ASSAY OF MAGNESIUM: CPT | Performed by: INTERNAL MEDICINE

## 2023-06-18 PROCEDURE — 84145 PROCALCITONIN (PCT): CPT | Performed by: STUDENT IN AN ORGANIZED HEALTH CARE EDUCATION/TRAINING PROGRAM

## 2023-06-18 RX ORDER — FUROSEMIDE 20 MG/1
20 TABLET ORAL
Status: DISCONTINUED | OUTPATIENT
Start: 2023-06-18 | End: 2023-06-19 | Stop reason: HOSPADM

## 2023-06-18 RX ORDER — POTASSIUM CHLORIDE 20 MEQ/1
20 TABLET, EXTENDED RELEASE ORAL ONCE
Status: COMPLETED | OUTPATIENT
Start: 2023-06-18 | End: 2023-06-18

## 2023-06-18 RX ORDER — FUROSEMIDE 20 MG/1
20 TABLET ORAL
Status: DISCONTINUED | OUTPATIENT
Start: 2023-06-18 | End: 2023-06-18

## 2023-06-18 RX ADMIN — FUROSEMIDE 20 MG: 20 TABLET ORAL at 15:40

## 2023-06-18 RX ADMIN — INSULIN LISPRO 1 UNITS: 100 INJECTION, SOLUTION INTRAVENOUS; SUBCUTANEOUS at 16:21

## 2023-06-18 RX ADMIN — RANOLAZINE 500 MG: 500 TABLET, FILM COATED, EXTENDED RELEASE ORAL at 18:17

## 2023-06-18 RX ADMIN — TAMSULOSIN HYDROCHLORIDE 0.4 MG: 0.4 CAPSULE ORAL at 15:40

## 2023-06-18 RX ADMIN — ATORVASTATIN CALCIUM 40 MG: 40 TABLET, FILM COATED ORAL at 15:40

## 2023-06-18 RX ADMIN — HEPARIN SODIUM 5000 UNITS: 5000 INJECTION INTRAVENOUS; SUBCUTANEOUS at 05:34

## 2023-06-18 RX ADMIN — FUROSEMIDE 20 MG: 20 TABLET ORAL at 10:39

## 2023-06-18 RX ADMIN — CLONIDINE HYDROCHLORIDE 0.1 MG: 0.1 TABLET ORAL at 21:36

## 2023-06-18 RX ADMIN — INSULIN LISPRO 1 UNITS: 100 INJECTION, SOLUTION INTRAVENOUS; SUBCUTANEOUS at 21:35

## 2023-06-18 RX ADMIN — HEPARIN SODIUM 5000 UNITS: 5000 INJECTION INTRAVENOUS; SUBCUTANEOUS at 21:34

## 2023-06-18 RX ADMIN — INSULIN GLARGINE 10 UNITS: 100 INJECTION, SOLUTION SUBCUTANEOUS at 21:33

## 2023-06-18 RX ADMIN — POTASSIUM CHLORIDE 20 MEQ: 1500 TABLET, EXTENDED RELEASE ORAL at 13:08

## 2023-06-18 RX ADMIN — TRIMETHOBENZAMIDE HYDROCHLORIDE 200 MG: 100 INJECTION INTRAMUSCULAR at 08:24

## 2023-06-18 RX ADMIN — ASPIRIN 81 MG CHEWABLE TABLET 81 MG: 81 TABLET CHEWABLE at 09:34

## 2023-06-18 RX ADMIN — CLONIDINE HYDROCHLORIDE 0.1 MG: 0.1 TABLET ORAL at 09:35

## 2023-06-18 RX ADMIN — INSULIN LISPRO 1 UNITS: 100 INJECTION, SOLUTION INTRAVENOUS; SUBCUTANEOUS at 11:25

## 2023-06-18 RX ADMIN — METOPROLOL TARTRATE 25 MG: 25 TABLET, FILM COATED ORAL at 18:17

## 2023-06-18 RX ADMIN — CLOPIDOGREL BISULFATE 75 MG: 75 TABLET ORAL at 09:34

## 2023-06-18 RX ADMIN — TRIMETHOBENZAMIDE HYDROCHLORIDE 200 MG: 100 INJECTION INTRAMUSCULAR at 15:40

## 2023-06-18 RX ADMIN — RANOLAZINE 500 MG: 500 TABLET, FILM COATED, EXTENDED RELEASE ORAL at 09:34

## 2023-06-18 RX ADMIN — METOPROLOL TARTRATE 25 MG: 25 TABLET, FILM COATED ORAL at 05:34

## 2023-06-18 RX ADMIN — HEPARIN SODIUM 5000 UNITS: 5000 INJECTION INTRAVENOUS; SUBCUTANEOUS at 13:08

## 2023-06-18 RX ADMIN — ISOSORBIDE MONONITRATE 60 MG: 60 TABLET, EXTENDED RELEASE ORAL at 09:34

## 2023-06-18 NOTE — ASSESSMENT & PLAN NOTE
· Echo in July 2022 with LVEF 32-91%, grade 1 diastolic dysfunction as well as mild aortic stenosis and mildly dilated aortic root  · Echo 6/17:  LVEF 11%, reduced systolic function with grade 2 diastolic dysfunction with mild to moderate AV stenosis

## 2023-06-18 NOTE — SPEECH THERAPY NOTE
Speech-Language Pathology Bedside Swallow Evaluation        Patient Name: Patrick BARRETTCYS'R Date: 6/18/2023     Problem List  Principal Problem:    Acute on chronic diastolic heart failure ([address] )  Active Problems:    Hx of CABG    S/P AAA repair    H/O prostate cancer    Type 2 diabetes mellitus with diabetic polyneuropathy, with long-term current use of insulin (HCC)    Mild aortic stenosis    Diabetic ulcer of right midfoot associated with type 2 diabetes mellitus, limited to breakdown of skin (HCC)    Acute kidney injury superimposed on chronic kidney disease (HCC)    Frequent PVCs         Summary    Oral and pharyngeal stages of swallowing appeared WNL, no c/o food dysphagia symptoms or overt s/s aspiration  Recommendations:   Diet: regular diet and thin liquids   Meds: whole with liquid   Frequent Oral care: 2x/day  Other Recommendations/ considerations: no follow up tx needed        Current Medical Status  Pt is a [de-identified] y o  male who presented to [facility]   with acute on chronic diastolic heart failure  Pt presented to [facility] ER on the evening of 6/16 accompanied by his daughters for worsening shortness of breath over the last 2 days  Patient reports that he has been feeling more short of breath over the last several days and patient's daughters at bedside reports that they came over to his house to visit him and he reportedly looked like he was struggling to breathe which prompted them to bring him to the ER  Patient does additionally report lower extremity swelling as well as abdominal distention  Patient denies any chest pain or abdominal pain  Past medical history:   Please see H&P for details    Special Studies:  CT-abd/pelvis w/o contrast: 6/17/23 LOWER CHEST: Coronary calcification is present  Some groundglass opacities are noted in a peribronchial distribution and may be related pneumonia or interstitial edema  This is a new finding    Linear atelectasis at the right lung base  Social/Education/Vocational Hx:  Pt lives alone    Swallow Information   Current Risks for Dysphagia & Aspiration: SOB  Current Symptoms/Concerns: change in respiratory status  Current Diet: regular diet and thin liquids   Baseline Diet: regular diet and thin liquids  Takes pills- whole w/ water     Baseline Assessment   Behavior/Cognition: alert  Speech/Language Status: able to participate in conversation and able to follow commands  Patient Positioning: upright in bed     Swallow Mechanism Exam   Facial: symmetrical  Labial: WFL  Lingual: WFL  Velum: unable to visualize  Mandible: adequate ROM  Dentition: adequate  Vocal quality:clear/adequate   Volitional Cough: strong/productive   Respiratory: RA    Consistencies Assessed and Performance   Consistencies Administered: thin liquids, nectar thick, puree and hard solids    Oral Stage: pt was able to bite crackers, drink liquids from cup and straw w/ good oral control and transfer  Mastication/manipulation were WNL  No residue noted  Pharyngeal Stage: swallows were prompt with complete laryngeal excursion  No coughing, throat clearing or wet voice noted         Esophageal Concerns: none reported      Results Reviewed with: patient and family   Dysphagia Goals: none at this time      April Dianna Rodriguez, [address] [facility]  Speech Pathologist  [place] license # [id number]  [place] license # [id number]  Available via DrDoctor

## 2023-06-18 NOTE — PROGRESS NOTES
[facility] 43  Progress Note  Name: Savana Patel  MRN: [id number]  Unit/Bed#: [address] [address] I Date of Admission: 6/16/2023   Date of Service: 6/18/2023 I Hospital Day: 2    Assessment/Plan   * Acute on chronic diastolic heart failure [facility])  Assessment & Plan  Wt Readings from Last 3 Encounters:   06/18/23 (!) 170 kg (374 lb 12 5 oz)   06/15/23 114 kg (252 lb)   06/08/23 113 kg (250 lb)     · Shortness of breath, abdominal distention, lower extremity edema  · BNP elevated, CT chest without contrast with evidence of pulmonary edema  · EKG sinus, rate 79, first-degree AV block with frequent PVCs  · Initial troponin negative  · Echo in July 2022 with LV EF 50-55%  Diastolic function mildly abnormal, consistent with grade 1 relaxation  Mild aortic stenosis    Aortic root mildly dilated  · Noted IVY on CKD  · Given IV Lasix 40 mg in the ER  · Not on any PTA Lasix or Bumex, does appear patient is on PTA HCTZ  · Echo 6/17:  LVEF 99%, reduced systolic function with grade 2 diastolic dysfunction with mild to moderate AV stenosis   · Significant improvement with lasix okay to switch to oral today; patient doing well on room air   · IV lasix switched to p o  lasix 20 mg BID  · Monitor I/O and standing weights  · See IVY on CKD section  · Supplemental O2 as needed  · Cardiology following        Frequent PVCs  Assessment & Plan  · 6/17 : EKG obtained showing 1st degree AVB with frequent PVC's  · Cardiology rec change metoprolol succinate to metoprolol tartrate 12 5 mg BID  · TSH WNL  · Continue to monitor on telemetry; patient still with frequent PVC's  · Replete electrolytes as needed  · Will give potassium 20 meq today  · Obtain bmp and mag in am     Acute kidney injury superimposed on chronic kidney disease [facility])  Assessment & Plan  Lab Results   Component Value Date    EGFR 31 06/18/2023    EGFR 26 06/17/2023    EGFR 26 06/16/2023    CREATININE 1 97 (H) 06/18/2023    CREATININE 2 25 (H) 06/17/2023 CREATININE 2 23 (H) 06/16/2023   · Creatinine 2 2 POA, baseline appears to be 1 5  · History of CKD stage II and follows with Dr Indiana Novoa outpatient; per review of chart appears the etiology of CKD suspected to be hypertensive nephrosclerosis and diabetic kidney disease  · History of prostate cancer and has urethral device that assists with the opening of the sphincter  · CT abdomen pelvis without contrast: Patchy ggo in the a peribronchovascular distribution maybe related to edema or developing pna  No evidence of small bowel dilatation  No hydronephrosis The bladder is mildly distended  · Urine Albumin/Creatinine: normal  · Cr down trending    · Bladder scan 17 ml  · Avoid hypotension   · Continue to hold nephrotoxic medication   · Nephrology following    Diabetic ulcer of right midfoot associated with type 2 diabetes mellitus, limited to breakdown of skin [facility])  Assessment & Plan  Lab Results   Component Value Date    HGBA1C 6 3 (H) 06/01/2023       Recent Labs     06/17/23  1604 06/17/23  2052 06/18/23  0707 06/18/23  1106   POCGLU 161* 149* 115 216*       Blood Sugar Average: Last 72 hrs:  · (P) 132 875   · Has a small diabetic ulcer on the right side of the foot which patient reports he saw his podiatrist for about a week and a half ago and was put on Bactrim for a reported infection started on 6/6 for 10 days  · Does not appear acutely infected with surrounding cellulitis  · Hold bactrim in setting of IVY on CKD   Patient did no receive full abx course   · Follow up with podiatry as outpatient     Mild aortic stenosis  Assessment & Plan  · Echo in July 2022 with LVEF 89-82%, grade 1 diastolic dysfunction as well as mild aortic stenosis and mildly dilated aortic root  · Echo 6/17:  LVEF 75%, reduced systolic function with grade 2 diastolic dysfunction with mild to moderate AV stenosis     Type 2 diabetes mellitus with diabetic polyneuropathy, with long-term current use of insulin ([address] )  Assessment & Plan  Lab Results   Component Value Date    HGBA1C 6 3 (H) 06/01/2023       Recent Labs     06/17/23  1604 06/17/23  2052 06/18/23  0707 06/18/23  1106   POCGLU 161* 149* 115 216*       Blood Sugar Average: Last 72 hrs:  · (P) 278 [id number] on metabolic panel 79  ·  Decrease PTA glargine to 10 units nightly instead of 22 units nightly  · SSI with meals  · Hold oral hypoglycemic agents    Hx of CABG  Assessment & Plan  · Follows with Dr Samy Freitas outpatient and saw about a week and a half ago; denies any chest pain today or abdominal pain  · Per Dr Boris Tiwari last note history of triple-vessel bypass with last catheterization in November 2022 which showed only a patent LIMA to LAD  Proximal % occluded, proximal % occluded  Vein graft % occluded  Obtuse marginal 200% occluded  Vein graft to OM T2, 100% occluded  AV groove circumflex widely patent which feeds posterior lateral breaks  Patient has grade 1 to grade 2 collaterals to right circulation from the left circulation  EF is 50%  · Per last cardiology note patient is on aspirin and Plavix for 6 months and then will only be on aspirin  · Continue PTA aspirin, Plavix, statin  · Continuing PTA metoprolol  · We are holding patient's other antihypertensive agents as we are aggressively diuresing patient to avoid provoking hypotension  · If patient has elevated blood pressures, we will systematically add them back on  · CV team consulted               VTE Pharmacologic Prophylaxis: VTE Score: 7 High Risk (Score >/= 5) - Pharmacological DVT Prophylaxis Ordered: heparin  Sequential Compression Devices Ordered  Patient Centered Rounds: I performed bedside rounds with nursing staff today  Discussions with Specialists or Other Care Team Provider: multidisciplinary teams    Education and Discussions with Family / Patient: Updated  (daughter) at bedside      Total Time Spent on Date of Encounter in care of patient: 45 minutes This time was spent on one or more of the following: performing physical exam; counseling and coordination of care; obtaining or reviewing history; documenting in the medical record; reviewing/ordering tests, medications or procedures; communicating with other healthcare professionals and discussing with patient's family/caregivers  Current Length of Stay: 2 day(s)  Current Patient Status: Inpatient   Certification Statement: The patient will continue to require additional inpatient hospital stay due to ongoing diuresis, and electrolyte and renal monitoring  Discharge Plan: Anticipate discharge in 24-48 hrs to home  Code Status: Level 3 - DNAR and DNI    Subjective:   Patient evaluated laying in bed appears in no acute distress  He reports feeling much better today  His breathing has improved no longer requiring supplemental oxygen  Encourage oob ambulation  Patient with episodes of nausea this am following breakfast which was relieved with prn nausea medication  He denies any pain, cp , sob, light headedness, dizziness, or constipation  He denies pain in his R foot  Does not appear infected at this time  Objective:     Vitals:   Temp (24hrs), Av 1 °F (36 7 °C), Min:97 7 °F (36 5 °C), Max:98 6 °F (37 °C)    Temp:  [97 7 °F (36 5 °C)-98 6 °F (37 °C)] 97 7 °F (36 5 °C)  HR:  [65-99] 65  Resp:  [18-20] 20  BP: (131-160)/(60-79) 137/79  SpO2:  [90 %-97 %] 93 %  Body mass index is 48 12 kg/m²  Input and Output Summary (last 24 hours): Intake/Output Summary (Last 24 hours) at 2023 1121  Last data filed at 2023 1001  Gross per 24 hour   Intake 400 ml   Output 2350 ml   Net -1950 ml       Physical Exam:   Physical Exam  Constitutional:       General: He is not in acute distress  Appearance: Normal appearance  HENT:      Head: Normocephalic and atraumatic  Cardiovascular:      Rate and Rhythm: Rhythm irregular  Heart sounds: Murmur heard     Pulmonary:      Effort: Pulmonary effort is normal  No respiratory distress  Breath sounds: No wheezing or rales  Abdominal:      General: Bowel sounds are normal  There is no distension  Palpations: Abdomen is soft  Tenderness: There is no abdominal tenderness  There is no guarding  Musculoskeletal:      Right lower leg: Edema (trace) present  Left lower leg: Edema (trace) present  Feet:      Comments: R foot wound present  Does not appear infected at this time  Nontender, no erythema, or exudate  Skin:     General: Skin is warm and dry  Neurological:      Mental Status: He is alert and oriented to person, place, and time            Additional Data:     Labs:  Results from last 7 days   Lab Units 06/17/23  0519   WBC Thousand/uL 10 71*   HEMOGLOBIN g/dL 11 9*   HEMATOCRIT % 37 1   PLATELETS Thousands/uL 213   NEUTROS PCT % 79*   LYMPHS PCT % 11*   MONOS PCT % 9   EOS PCT % 1     Results from last 7 days   Lab Units 06/18/23  0527 06/17/23  0519   SODIUM mmol/L 137 136   POTASSIUM mmol/L 3 7 4 0   CHLORIDE mmol/L 101 103   CO2 mmol/L 26 25   BUN mg/dL 42* 45*   CREATININE mg/dL 1 97* 2 25*   ANION GAP mmol/L 10 8   CALCIUM mg/dL 9 2 9 0   ALBUMIN g/dL  --  3 7   TOTAL BILIRUBIN mg/dL  --  0 69   ALK PHOS U/L  --  34   ALT U/L  --  13   AST U/L  --  14   GLUCOSE RANDOM mg/dL 121 83     Results from last 7 days   Lab Units 06/17/23  0519   INR  1 26*     Results from last 7 days   Lab Units 06/18/23  1106 06/18/23  0707 06/17/23  2052 06/17/23  1604 06/17/23  1124 06/17/23  0659 06/16/23  2233 06/16/23  2152   POC GLUCOSE mg/dl 216* 115 149* 161* 176* 91 89 66         Results from last 7 days   Lab Units 06/18/23  0527 06/17/23  0519 06/16/23  1803   LACTIC ACID mmol/L  --   --  1 0   PROCALCITONIN ng/ml 0 15 0 12  --        Lines/Drains:  Invasive Devices     Peripheral Intravenous Line  Duration           Peripheral IV 06/18/23 Proximal;Right;Ventral (anterior) Forearm <1 day                  Telemetry:  Telemetry Orders (From admission, onward)             24 Hour Telemetry Monitoring  Continuous x 24 Hours (Telem)        Expiring   Question:  Reason for 24 Hour Telemetry  Answer:  Decompensated CHF- and any one of the following: continuous diuretic infusion or total diuretic dose >200 mg daily, associated electrolyte derangement (I e  K < 3 0), ionotropic drip (continuous infusion), hx of ventricular arrhythmia, or new EF < 35%                 Telemetry Reviewed: first degree heart block with frequent pvc   Indication for Continued Telemetry Use: Arrthymias requiring medical therapy             Imaging: Reviewed radiology reports from this admission including: chest xray, chest CT scan and abdominal/pelvic CT    Recent Cultures (last 7 days):   Results from last 7 days   Lab Units 06/16/23  1803   BLOOD CULTURE  No Growth at 24 hrs  No Growth at 24 hrs         Last 24 Hours Medication List:   Current Facility-Administered Medications   Medication Dose Route Frequency Provider Last Rate   • acetaminophen  650 mg Oral Q6H PRN Christine Nurse, DO     • aspirin  81 mg Oral Daily Christine Nurse, DO     • atorvastatin  40 mg Oral Daily With Vladimir Sharp DO     • cloNIDine  0 1 mg Oral Q12H [address] Sofi Garay MD     • clopidogrel  75 mg Oral Daily Christine Nurse, DO     • furosemide  20 mg Oral BID (diuretic) Sofi Garay MD     • heparin (porcine)  5,000 Units Subcutaneous Q8H [address] Christine Nurse, DO     • insulin glargine  10 Units Subcutaneous HS Christine Nurse, DO     • insulin lispro  1-5 Units Subcutaneous TID AC Christine Nurse, DO     • insulin lispro  1-5 Units Subcutaneous HS Christine Nurse, DO     • isosorbide mononitrate  60 mg Oral Daily Christine Nurse, DO     • metoprolol tartrate  25 mg Oral Q12H [address] APOLLO Coburn     • ranolazine  500 mg Oral BID Christine Nurse, DO     • tamsulosin  0 4 mg Oral Daily With Dinner Sincere Malcolm DO     • trimethobenzamide  200 mg Intramuscular Q6H PRN Sincere Malcolm, DO Today, Patient Was Seen By: APOLLO Perry    **Please Note: This note may have been constructed using a voice recognition system  **

## 2023-06-18 NOTE — PROGRESS NOTES
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 EF 45 to 50% in the setting of advanced kidney dysfunction/frequent PVCs-transition to p o  diuretics  Reports good response to p o  Lasix  Recommend sleep screen prior to discharge for frequent PVCs  Continue on metoprolol therapy  Continue Jardiance therapy  Discontinue hydrochlorothiazide on discharge    Coronary artery disease with history of bypass grafting-continue Plavix and atorvastatin 40 mg  No losing 100 mg twice a day plus Imdur 60 mg    Mild aortic stenosis    Diabetes mellitus with last A1c 6 3    Subjective:   Reports significant improvement in his shortness of breath  Still with frequent monomorphic PVCs    Objective:     Vitals: Blood pressure 137/79, pulse 65, temperature 97 7 °F (36 5 °C), resp  rate 20, height 6' 2\" (1 88 m), weight 108 kg (238 lb), SpO2 93 %  Vitals:    06/18/23 0539 06/18/23 1100   Weight: (!) 170 kg (374 lb 12 5 oz) 108 kg (238 lb)     Body mass index is 30 56 kg/m²  BP Readings from Last 3 Encounters:   06/18/23 137/79   06/15/23 122/62   06/08/23 120/60     Orthostatic Blood Pressures    Flowsheet Row Most Recent Value   Blood Pressure 137/79 filed at 06/18/2023 0824   Patient Position - Orthostatic VS Lying filed at 06/16/2023 2030        I/O       06/16 0701  06/17 0700 06/17 0701  06/18 0700 06/18 0701  06/19 0700    P  O   1100     I V  (mL/kg)  10 (0 1)     Total Intake(mL/kg)  1110 (6 5)     Urine (mL/kg/hr) 1700 3430 (0 8) 500 (0 6)    Total Output 1700 3430 500    Net -1700 -2320 -500               Invasive Devices     Peripheral Intravenous Line  Duration           Peripheral IV 06/18/23 Proximal;Right;Ventral (anterior) Forearm <1 day                  Intake/Output Summary (Last 24 hours) at 6/18/2023 1443  Last data filed " at 6/18/2023 1001  Gross per 24 hour   Intake 400 ml   Output 2350 ml   Net -1950 ml     Physical Examination:   Physical Exam  Constitutional:       General: He is not in acute distress  Appearance: He is well-developed  He is not diaphoretic  HENT:      Head: Normocephalic and atraumatic  Right Ear: External ear normal       Left Ear: External ear normal    Eyes:      General: No scleral icterus  Right eye: No discharge  Left eye: No discharge  Conjunctiva/sclera: Conjunctivae normal       Pupils: Pupils are equal, round, and reactive to light  Neck:      Thyroid: No thyromegaly  Vascular: No JVD  Trachea: No tracheal deviation  Cardiovascular:      Rate and Rhythm: Normal rate and regular rhythm  Heart sounds: Murmur heard  No friction rub  Gallop present  Pulmonary:      Effort: Pulmonary effort is normal  No respiratory distress  Breath sounds: Normal breath sounds  No stridor  No wheezing or rales  Chest:      Chest wall: No tenderness  Abdominal:      General: Bowel sounds are normal  There is no distension  Palpations: Abdomen is soft  There is no mass  Tenderness: There is no abdominal tenderness  There is no guarding or rebound  Musculoskeletal:         General: No tenderness or deformity  Normal range of motion  Cervical back: Normal range of motion and neck supple  Skin:     General: Skin is warm and dry  Coloration: Skin is not pale  Findings: No erythema or rash  Neurological:      Mental Status: He is alert and oriented to person, place, and time  Cranial Nerves: No cranial nerve deficit  Motor: No abnormal muscle tone  Coordination: Coordination normal       Deep Tendon Reflexes: Reflexes are normal and symmetric  Reflexes normal    Psychiatric:         Behavior: Behavior normal          Thought Content:  Thought content normal          Judgment: Judgment normal          Labs:   Lab Results: "I Have Reviewed All Lab Data Below:  CBC with diff:  Results from last 7 days   Lab Units 06/17/23  0519 06/16/23  1803   WBC Thousand/uL 10 71* 10 46*   HEMOGLOBIN g/dL 11 9* 11 9*   HEMATOCRIT % 37 1 36 8   MCV fL 85 85   PLATELETS Thousands/uL 213 226   RBC Million/uL 4 36 4 31   MCH pg 27 3 27 6   MCHC g/dL 32 1 32 3   RDW % 13 8 14 0   MPV fL 11 3 11 3   NRBC AUTO /100 WBCs 0 0       CMP:  Results from last 7 days   Lab Units 06/18/23  0527 06/17/23  0519 06/16/23  1803   SODIUM mmol/L 137 136 133*   POTASSIUM mmol/L 3 7 4 0 4 5   CHLORIDE mmol/L 101 103 101   CO2 mmol/L 26 25 24   ANION GAP mmol/L 10 8 8   BUN mg/dL 42* 45* 43*   CREATININE mg/dL 1 97* 2 25* 2 23*   CALCIUM mg/dL 9 2 9 0 8 9   AST U/L  --  14 19   ALT U/L  --  13 18   ALK PHOS U/L  --  34 36   TOTAL PROTEIN g/dL  --  7 1 7 6   ALBUMIN g/dL  --  3 7 3 8   TOTAL BILIRUBIN mg/dL  --  0 69 0 69   EGFR ml/min/1 73sq m 31 26 26     Magnesium:  Results from last 7 days   Lab Units 06/18/23 0527 06/17/23  0519 06/16/23  1803   MAGNESIUM mg/dL 2 3 2 2 2 2       Coags:  Results from last 7 days   Lab Units 06/17/23 0519 06/16/23  1803   PTT seconds 34 33   INR  1 26* 1 20*     TSH:  Results from last 7 days   Lab Units 06/17/23  0519   TSH 3RD GENERATON uIU/mL 1 536     Lipid Profile:    Hgb A1c:    NT-proBNP: No results for input(s): \"NTBNP\" in the last 72 hours  Troponins:      Imaging & Testing   I have personally reviewed pertinent reports  Cardiac Testing   No results found for this or any previous visit  No results found for this or any previous visit  No results found for this or any previous visit  No results found for this or any previous visit  No results found for this or any previous visit      Results for orders placed during the hospital encounter of 02/16/22    NM Myocardial Perfusion Spect (Pharmacological Induced Stress and/or Rest)    Interpretation Summary  •  Stress Combined Conclusion: Left ventricular " "perfusion is abnormal  There is mild photon reduction in the anterior wall at stress  Findings are consistent with ischemia  Additional area of infarct involving the mid and apical portion of the inferior wall  •  Stress Function: Left ventricular function post-stress is abnormal  Global function is mildly reduced  There were no regional wall motion abnormalities during stress  Post-stress ejection fraction is 40 %  •  Stress ECG: No ST deviation is noted  There were no arrhythmias during recovery    The ECG was negative for ischemia  •  Perfusion: There is a left ventricular perfusion defect that is small in size with severe reduction in uptake present in the mid to apical inferior location(s) that is fixed  There is a left ventricular perfusion defect that is small to medium in size with mild reduction in uptake present in the mid to apical anterior location(s) that is reversible  EKG: Personally reviewed  Counseling :  Counseling / Coordination of Care Time: 40min  Greater than 50% of total time spent on patient counseling and coordination of care  Code Status: Level 3 - DNAR and DNI  Collaboration of Care: Were Recommendations Directly Discussed with Primary Treatment Team? - Yes     Gabby Galan MD [facility] [place]    \"This note has been constructed using a voice recognition system  Therefore there may be syntax, spelling, and/or grammatical errors  Please call if you have any questions   \"                  "

## 2023-06-18 NOTE — ED PROVIDER NOTES
History  Chief Complaint   Patient presents with   • Shortness of Breath     SOB that started today, being treated for a foot wound recently but states this is new     78yoM hx CAD,CHF,DM presents with SOB for one day  Breathing is not normal per family  Denies increased leg swelling or weight gain  Recently on bactrim for R foot ulcer which is improving (day 7 of abx)  Prior to Admission Medications   Prescriptions Last Dose Informant Patient Reported? Taking? Blood Glucose Monitoring Suppl (OneTouch Verio Reflect) w/Device KIT  Self No No   Sig: Check blood sugars twice daily  Please substitute with appropriate alternative as covered by patient's insurance  Dx: E11 65   Insulin Glargine Solostar (Lantus SoloStar) 100 UNIT/ML SOPN  Self No No   Sig: Inject 0 22 mL (22 Units total) under the skin every evening   Insulin Pen Needle 32G X 4 MM MISC  Self No No   Sig: Use every evening   Jardiance 25 MG TABS 6/16/2023 Self No Yes   Sig: TAKE 1 TABLET BY MOUTH IN  THE MORNING   Multiple Vitamins-Minerals (MULTIVITAMIN MEN 50+ PO) 6/16/2023 Self Yes Yes   Sig: Take by mouth daily   Omega-3 Fatty Acids (fish oil) 1,000 mg 6/16/2023 Self Yes Yes   Sig: Take 4,000 mg by mouth daily    OneTouch Delica Lancets 82S MISC  Self No No   Sig: Check blood sugars twice daily  Please substitute with appropriate alternative as covered by patient's insurance   Dx: E11 65   amLODIPine (NORVASC) 10 mg tablet 6/16/2023 Self No Yes   Sig: TAKE 1 TABLET BY MOUTH  DAILY   aspirin 81 mg chewable tablet 6/16/2023 Self Yes Yes   Sig: Chew 81 mg daily   atorvastatin (LIPITOR) 40 mg tablet 6/15/2023 Self No Yes   Sig: TAKE 1 TABLET BY MOUTH  DAILY   cadexomer iodine (IODOSORB) 0 9 % gel 6/14/2023 Self No No   Sig: Apply 1 application topically daily as needed for wound care   ciclopirox (LOPROX) 0 77 % cream 6/16/2023  No Yes   Sig: Apply topically 2 (two) times a day for 20 days   cloNIDine (CATAPRES) 0 1 mg tablet 6/16/2023 at 1000  No Yes   Sig: take 1 tablet by mouth every 12 hours   clopidogrel (PLAVIX) 75 mg tablet 6/16/2023  No Yes   Sig: Take 1 tablet (75 mg total) by mouth daily   fenofibrate 160 MG tablet 6/16/2023 Self No Yes   Sig: TAKE 1 TABLET BY MOUTH  DAILY   gabapentin (NEURONTIN) 600 MG tablet 6/16/2023 at 1000 Self No Yes   Sig: TAKE 1 TABLET BY MOUTH  TWICE DAILY   glimepiride (AMARYL) 2 mg tablet 6/16/2023 at 1000 Self No Yes   Sig: Take 1 tablet (2 mg total) by mouth 2 (two) times a day   glucose blood (OneTouch Verio) test strip  Self No No   Sig: Check blood sugars twice daily  Please substitute with appropriate alternative as covered by patient's insurance   Dx: E11 65   hydrochlorothiazide (HYDRODIURIL) 12 5 mg tablet 6/16/2023 at 1000 Self No Yes   Sig: TAKE 1 TABLET BY MOUTH DAILY   isosorbide mononitrate (IMDUR) 60 mg 24 hr tablet 6/16/2023 Self No Yes   Sig: Take 1 tablet (60 mg total) by mouth daily   losartan (COZAAR) 100 MG tablet 6/16/2023 Self No Yes   Sig: TAKE 1 TABLET BY MOUTH  DAILY   metFORMIN (GLUCOPHAGE) 500 mg tablet 6/16/2023 Self No Yes   Sig: TAKE 1 TABLET BY MOUTH  TWICE DAILY WITH MEALS   metoprolol succinate (TOPROL-XL) 50 mg 24 hr tablet 6/16/2023 Self No Yes   Sig: TAKE 1 TABLET BY MOUTH  DAILY   nitroglycerin (NITROSTAT) 0 4 mg SL tablet  Self No No   Sig: Place 1 tablet (0 4 mg total) under the tongue every 5 (five) minutes as needed for chest pain   ranolazine (RANEXA) 500 mg 12 hr tablet 6/16/2023 Self No Yes   Sig: Take 1 tablet (500 mg total) by mouth 2 (two) times a day   sitaGLIPtin (JANUVIA) 100 mg tablet 6/16/2023 Self Yes Yes   Sig: Take 100 mg by mouth daily   sulfamethoxazole-trimethoprim (BACTRIM DS) 800-160 mg per tablet 6/16/2023  No Yes   Sig: Take 1 tablet by mouth every 12 (twelve) hours for 10 days      Facility-Administered Medications: None       Past Medical History:   Diagnosis Date   • CAD (coronary artery disease)    • Cancer ([address] )     prostate   • CHF (congestive heart failure) ([facility] )    • Diabetes mellitus ([facility] )    • Myocardial infarction [facility])        Past Surgical History:   Procedure Laterality Date   • ADENOIDECTOMY     • CARDIAC CATHETERIZATION Left 10/19/2022    Procedure: Cardiac Left Heart Cath;  Surgeon: Oralia Ca MD;  Location: [facility] CARDIAC CATH LAB; Service: Cardiology   • CARDIAC SURGERY  2002    3 cardiac bypass then angioplasty 2020   • CHOLECYSTECTOMY     • CORONARY ARTERY BYPASS GRAFT     • PROSTATE SURGERY     • TONSILLECTOMY         Family History   Problem Relation Age of Onset   • Diabetes Mother    • Alcohol abuse Father    • Mental illness Neg Hx      I have reviewed and agree with the history as documented  E-Cigarette/Vaping   • E-Cigarette Use Never User      E-Cigarette/Vaping Substances   • Nicotine No    • THC No    • CBD No    • Flavoring No    • Other No    • Unknown No      Social History     Tobacco Use   • Smoking status: Former     Packs/day: 1 50     Years: 33 00     Total pack years: 49 50     Types: Cigarettes     Start date:      Quit date:      Years since quittin 4   • Smokeless tobacco: Never   Vaping Use   • Vaping Use: Never used   Substance Use Topics   • Alcohol use: Yes     Alcohol/week: 14 0 standard drinks of alcohol     Types: 14 Cans of beer per week     Comment: 1 -2 daily   • Drug use: Never       Review of Systems   Constitutional: Negative for fever  Respiratory: Negative for cough  Physical Exam  Physical Exam  Vitals reviewed  Constitutional:       Appearance: He is well-developed  HENT:      Head: Normocephalic and atraumatic  Right Ear: External ear normal       Left Ear: External ear normal       Nose: Nose normal  No rhinorrhea  Mouth/Throat:      Mouth: Mucous membranes are moist    Eyes:      Conjunctiva/sclera: Conjunctivae normal    Cardiovascular:      Rate and Rhythm: Normal rate and regular rhythm     Pulmonary:      Effort: Pulmonary effort is normal       Breath sounds: Decreased breath sounds and rales present  No wheezing  Abdominal:      Palpations: Abdomen is soft  Tenderness: There is no abdominal tenderness  Musculoskeletal:      Cervical back: Neck supple  Right lower leg: No edema  Left lower leg: No edema  Skin:     General: Skin is warm and dry  Neurological:      Mental Status: He is alert and oriented to person, place, and time     Psychiatric:         Mood and Affect: Mood normal          Vital Signs  ED Triage Vitals   Temperature Pulse Respirations Blood Pressure SpO2   06/16/23 1654 06/16/23 1654 06/16/23 1654 06/16/23 1654 06/16/23 1654   99 8 °F (37 7 °C) 79 (!) 24 (!) 175/75 92 %      Temp src Heart Rate Source Patient Position - Orthostatic VS BP Location FiO2 (%)   -- 06/16/23 1912 06/16/23 1912 06/16/23 1912 --    Monitor Lying Left arm       Pain Score       06/16/23 1654       No Pain           Vitals:    06/17/23 2136 06/18/23 0356 06/18/23 0824 06/18/23 1514   BP: 131/73 137/74 137/79 109/71   Pulse: 84 85 65 70   Patient Position - Orthostatic VS:             Visual Acuity  Visual Acuity    Flowsheet Row Most Recent Value   L Pupil Size (mm) 4   R Pupil Size (mm) 4          ED Medications  Medications   aspirin chewable tablet 81 mg (81 mg Oral Given 6/18/23 0934)   atorvastatin (LIPITOR) tablet 40 mg (40 mg Oral Given 6/18/23 1540)   clopidogrel (PLAVIX) tablet 75 mg (75 mg Oral Given 6/18/23 0934)   isosorbide mononitrate (IMDUR) 24 hr tablet 60 mg (60 mg Oral Given 6/18/23 0934)   ranolazine (RANEXA) 12 hr tablet 500 mg (500 mg Oral Given 6/18/23 0934)   heparin (porcine) subcutaneous injection 5,000 Units (5,000 Units Subcutaneous Given 6/18/23 1308)   acetaminophen (TYLENOL) tablet 650 mg (650 mg Oral Given 6/17/23 0846)   insulin lispro (HumaLOG) 100 units/mL subcutaneous injection 1-5 Units (1 Units Subcutaneous Given 6/18/23 1125)   insulin lispro (HumaLOG) 100 units/mL subcutaneous injection 1-5 Units ( Subcutaneous Not Given 6/17/23 2137)   insulin glargine (LANTUS) subcutaneous injection 10 Units 0 1 mL (10 Units Subcutaneous Given 6/17/23 2138)   metoprolol tartrate (LOPRESSOR) tablet 25 mg (25 mg Oral Given 6/18/23 0534)   cloNIDine (CATAPRES) tablet 0 1 mg (0 1 mg Oral Given 6/18/23 0935)   tamsulosin (FLOMAX) capsule 0 4 mg (0 4 mg Oral Given 6/18/23 1540)   trimethobenzamide (TIGAN) IM injection 200 mg (200 mg Intramuscular Given 6/18/23 1540)   furosemide (LASIX) tablet 20 mg (20 mg Oral Given 6/18/23 1540)   furosemide (LASIX) injection 40 mg (40 mg Intravenous Given 6/16/23 2013)   potassium chloride (K-DUR,KLOR-CON) CR tablet 20 mEq (20 mEq Oral Given 6/18/23 1308)       Diagnostic Studies  Results Reviewed     Procedure Component Value Units Date/Time    Blood culture #1 [[id number]] Collected: 06/16/23 1803    Lab Status: Preliminary result Specimen: Blood from Arm, Right Updated: 06/18/23 0001     Blood Culture No Growth at 24 hrs  Blood culture #2 [[id number]] Collected: 06/16/23 1803    Lab Status: Preliminary result Specimen: Blood from Arm, Left Updated: 06/18/23 0001     Blood Culture No Growth at 24 hrs      Albumin / creatinine urine ratio [[id number]] Collected: 06/17/23 0048    Lab Status: Final result Specimen: Urine, Clean Catch Updated: 06/17/23 1217     Creatinine, Ur 33 5 mg/dL      Albumin,U,Random 9 1 mg/L      Albumin Creat Ratio 27 mg/g creatinine     TSH, 3rd generation [[id number]]  (Normal) Collected: 06/17/23 0519    Lab Status: Final result Specimen: Blood from Arm, Right Updated: 06/17/23 0634     TSH 3RD GENERATON 1 536 uIU/mL     Comprehensive metabolic panel [[id number]]  (Abnormal) Collected: 06/17/23 0519    Lab Status: Final result Specimen: Blood from Arm, Right Updated: 06/17/23 0620     Sodium 136 mmol/L      Potassium 4 0 mmol/L      Chloride 103 mmol/L      CO2 25 mmol/L      ANION GAP 8 mmol/L      BUN 45 mg/dL      Creatinine 2 25 mg/dL      Glucose 83 mg/dL      Calcium 9 0 mg/dL AST 14 U/L      ALT 13 U/L      Alkaline Phosphatase 34 U/L      Total Protein 7 1 g/dL      Albumin 3 7 g/dL      Total Bilirubin 0 69 mg/dL      eGFR 26 ml/min/1 73sq m     Narrative:      [place] guidelines for Chronic Kidney Disease (CKD):   •  Stage 1 with normal or high GFR (GFR > 90 mL/min/1 73 square meters)  •  Stage 2 Mild CKD (GFR = 60-89 mL/min/1 73 square meters)  •  Stage 3A Moderate CKD (GFR = 45-59 mL/min/1 73 square meters)  •  Stage 3B Moderate CKD (GFR = 30-44 mL/min/1 73 square meters)  •  Stage 4 Severe CKD (GFR = 15-29 mL/min/1 73 square meters)  •  Stage 5 End Stage CKD (GFR <15 mL/min/1 73 square meters)  Note: GFR calculation is accurate only with a steady state creatinine    Magnesium [[id number]]  (Normal) Collected: 06/17/23 0519    Lab Status: Final result Specimen: Blood from Arm, Right Updated: 06/17/23 0620     Magnesium 2 2 mg/dL     Protime-INR [[id number]]  (Abnormal) Collected: 06/17/23 0519    Lab Status: Final result Specimen: Blood from Arm, Right Updated: 06/17/23 0612     Protime 15 9 seconds      INR 1 26    APTT [[id number]]  (Normal) Collected: 06/17/23 0519    Lab Status: Final result Specimen: Blood from Arm, Right Updated: 06/17/23 0612     PTT 34 seconds     CBC and differential [[id number]]  (Abnormal) Collected: 06/17/23 0519    Lab Status: Final result Specimen: Blood from Arm, Right Updated: 06/17/23 0558     WBC 10 71 Thousand/uL      RBC 4 36 Million/uL      Hemoglobin 11 9 g/dL      Hematocrit 37 1 %      MCV 85 fL      MCH 27 3 pg      MCHC 32 1 g/dL      RDW 13 8 %      MPV 11 3 fL      Platelets 873 Thousands/uL      nRBC 0 /100 WBCs      Neutrophils Relative 79 %      Immat GRANS % 0 %      Lymphocytes Relative 11 %      Monocytes Relative 9 %      Eosinophils Relative 1 %      Basophils Relative 0 %      Neutrophils Absolute 8 51 Thousands/µL      Immature Grans Absolute 0 03 Thousand/uL      Lymphocytes Absolute 1 15 Thousands/µL      Monocytes Absolute 0 91 Thousand/µL      Eosinophils Absolute 0 08 Thousand/µL      Basophils Absolute 0 03 Thousands/µL     Sodium, urine, random [[id number]] Collected: 06/17/23 0048    Lab Status: Final result Specimen: Urine, Clean Catch Updated: 06/17/23 0113     Sodium, Ur 81    Creatinine, urine, random [[id number]] Collected: 06/17/23 0048    Lab Status: Final result Specimen: Urine, Clean Catch Updated: 06/17/23 0113     Creatinine, Ur 35 7 mg/dL     HS Troponin I 4hr [[id number]]  (Normal) Collected: 06/16/23 2251    Lab Status: Final result Specimen: Blood from Arm, Left Updated: 06/16/23 2321     hs TnI 4hr 40 ng/L      Delta 4hr hsTnI 1 ng/L     Magnesium [[id number]]  (Normal) Collected: 06/16/23 1803    Lab Status: Final result Specimen: Blood from Arm, Right Updated: 06/16/23 2114     Magnesium 2 2 mg/dL     HS Troponin I 2hr [[id number]]  (Normal) Collected: 06/16/23 2001    Lab Status: Final result Specimen: Blood from Arm, Right Updated: 06/16/23 2035     hs TnI 2hr 42 ng/L      Delta 2hr hsTnI 3 ng/L     Protime-INR [[id number]]  (Abnormal) Collected: 06/16/23 1803    Lab Status: Final result Specimen: Blood from Arm, Right Updated: 06/16/23 1927     Protime 15 4 seconds      INR 1 20    APTT [[id number]]  (Normal) Collected: 06/16/23 1803    Lab Status: Final result Specimen: Blood from Arm, Right Updated: 06/16/23 1927     PTT 33 seconds     FLU/RSV/COVID - if FLU/RSV clinically relevant [[id number]]  (Normal) Collected: 06/16/23 1807    Lab Status: Final result Specimen: Nares from Nose Updated: 06/16/23 1849     SARS-CoV-2 Negative     INFLUENZA A PCR Negative     INFLUENZA B PCR Negative     RSV PCR Negative    Narrative:      FOR PEDIATRIC PATIENTS - copy/paste COVID Guidelines URL to browser: [email or web address]    SARS-CoV-2 assay is a Nucleic Acid Amplification assay intended for the  qualitative detection of nucleic acid from SARS-CoV-2 in nasopharyngeal  swabs  Results are for the presumptive identification of SARS-CoV-2 RNA  Positive results are indicative of infection with SARS-CoV-2, the virus  causing COVID-19, but do not rule out bacterial infection or co-infection  with other viruses  Laboratories within the [place] and its  territories are required to report all positive results to the appropriate  public health authorities  Negative results do not preclude SARS-CoV-2  infection and should not be used as the sole basis for treatment or other  patient management decisions  Negative results must be combined with  clinical observations, patient history, and epidemiological information  This test has not been FDA cleared or approved  This test has been authorized by FDA under an Emergency Use Authorization  (EUA)  This test is only authorized for the duration of time the  declaration that circumstances exist justifying the authorization of the  emergency use of an in vitro diagnostic tests for detection of SARS-CoV-2  virus and/or diagnosis of COVID-19 infection under section 564(b)(1) of  the Act, 21 U  S C  966EOD-7(N)(1), unless the authorization is terminated  or revoked sooner  The test has been validated but independent review by FDA  and CLIA is pending  Test performed using [employer] GeneXpert: This RT-PCR assay targets N2,  a region unique to SARS-CoV-2  A conserved region in the E-gene was chosen  for pan-Sarbecovirus detection which includes SARS-CoV-2  According to CMS-2020-01-R, this platform meets the definition of high-throughput technology      HS Troponin 0hr (reflex protocol) [[id number]]  (Normal) Collected: 06/16/23 1803    Lab Status: Final result Specimen: Blood from Arm, Right Updated: 06/16/23 1841     hs TnI 0hr 39 ng/L     B-Type Natriuretic Peptide(BNP) [[id number]]  (Abnormal) Collected: 06/16/23 1803    Lab Status: Final result Specimen: Blood from Arm, Right Updated: 06/16/23 1840  pg/mL     Comprehensive metabolic panel [[id number]]  (Abnormal) Collected: 06/16/23 1803    Lab Status: Final result Specimen: Blood from Arm, Right Updated: 06/16/23 1833     Sodium 133 mmol/L      Potassium 4 5 mmol/L      Chloride 101 mmol/L      CO2 24 mmol/L      ANION GAP 8 mmol/L      BUN 43 mg/dL      Creatinine 2 23 mg/dL      Glucose 79 mg/dL      Calcium 8 9 mg/dL      AST 19 U/L      ALT 18 U/L      Alkaline Phosphatase 36 U/L      Total Protein 7 6 g/dL      Albumin 3 8 g/dL      Total Bilirubin 0 69 mg/dL      eGFR 26 ml/min/1 73sq m     Narrative:      [place] guidelines for Chronic Kidney Disease (CKD):   •  Stage 1 with normal or high GFR (GFR > 90 mL/min/1 73 square meters)  •  Stage 2 Mild CKD (GFR = 60-89 mL/min/1 73 square meters)  •  Stage 3A Moderate CKD (GFR = 45-59 mL/min/1 73 square meters)  •  Stage 3B Moderate CKD (GFR = 30-44 mL/min/1 73 square meters)  •  Stage 4 Severe CKD (GFR = 15-29 mL/min/1 73 square meters)  •  Stage 5 End Stage CKD (GFR <15 mL/min/1 73 square meters)  Note: GFR calculation is accurate only with a steady state creatinine    Lactic acid, plasma (w/reflex if result > 2 0) [[id number]]  (Normal) Collected: 06/16/23 1803    Lab Status: Final result Specimen: Blood from Arm, Right Updated: 06/16/23 1833     LACTIC ACID 1 0 mmol/L     Narrative:      Result may be elevated if tourniquet was used during collection      CBC and differential [[id number]]  (Abnormal) Collected: 06/16/23 1803    Lab Status: Final result Specimen: Blood from Arm, Right Updated: 06/16/23 1815     WBC 10 46 Thousand/uL      RBC 4 31 Million/uL      Hemoglobin 11 9 g/dL      Hematocrit 36 8 %      MCV 85 fL      MCH 27 6 pg      MCHC 32 3 g/dL      RDW 14 0 %      MPV 11 3 fL      Platelets 782 Thousands/uL      nRBC 0 /100 WBCs      Neutrophils Relative 79 %      Immat GRANS % 0 %      Lymphocytes Relative 11 %      Monocytes Relative 9 %      Eosinophils Relative 1 %      Basophils Relative 0 %      Neutrophils Absolute 8 24 Thousands/µL      Immature Grans Absolute 0 03 Thousand/uL      Lymphocytes Absolute 1 16 Thousands/µL      Monocytes Absolute 0 92 Thousand/µL      Eosinophils Absolute 0 09 Thousand/µL      Basophils Absolute 0 02 Thousands/µL                  CT abdomen pelvis wo contrast   Final Result by Reena Torres MD (06/17 1537)      Patchy groundglass opacities in a peribronchovascular distribution may be related to developing pneumonia or edema  No evidence of small bowel dilatation  No hydronephrosis  The bladder is only mildly distended although somewhat more distended than the study of May  Similar preliminary results were given by virtual radiologic at 4:44 a m  The study was marked in [facility] for immediate notification  Workstation performed: [id number]         CT chest without contrast   Final Result by Marline Roldan MD (06/16 1958)      Moderate septal thickening and moderate multifocal groundglass opacity due to interstitial and alveolar edema  Workstation performed: [id number]         XR chest 1 view portable   Final Result by Ginette Nguyen MD (06/17 9109)   Mild CHF  Workstation performed: [id number]                    Procedures  Procedures         ED Course                               SBIRT 20yo+    Flowsheet Row Most Recent Value   Initial Alcohol Screen: US AUDIT-C     1  How often do you have a drink containing alcohol? 0 Filed at: 06/16/2023 1657   2  How many drinks containing alcohol do you have on a typical day you are drinking? 0 Filed at: 06/16/2023 1657   3a  Male UNDER 65: How often do you have five or more drinks on one occasion? 0 Filed at: 06/16/2023 1657   3b  FEMALE Any Age, or MALE 65+: How often do you have 4 or more drinks on one occassion?  0 Filed at: 06/16/2023 1657   Audit-C Score 0 Filed at: 06/16/2023 1657   [place]: How many times in the past year have you    Used an illegal drug or used a prescription medication for non-medical reasons? Never Filed at: 06/16/2023 1657                    Medical Decision Making  Exam c/w volume overload  Pt treated with IV lasix  Admitted for hypoxia, CHF  IVY present likely d/t bactrim  Amount and/or Complexity of Data Reviewed  Labs: ordered  Radiology: ordered  Risk  Prescription drug management  Decision regarding hospitalization  Disposition  Final diagnoses:   CHF (congestive heart failure) ([facility] )   Hypoxia     Time reflects when diagnosis was documented in both MDM as applicable and the Disposition within this note     Time User Action Codes Description Comment    6/16/2023  8:07 PM Gillaaron Beth Add [I50 9] CHF (congestive heart failure) ([facility] )     6/16/2023  8:07 PM Nima Campos Add [R09 02] Hypoxia     6/16/2023  8:52 PM Amti Fails Add [I45 409,  L97 411] Diabetic ulcer of right midfoot associated with type 2 diabetes mellitus, limited to breakdown of skin ([place] [id number] )     6/16/2023  9:06 PM Mati Fails Add [N17 9,  N18 9] Acute kidney injury superimposed on chronic kidney disease [facility])       ED Disposition     ED Disposition   Admit    Condition   Stable    Date/Time   Fri Jun 16, 2023 2007    Comment   Case was discussed with Dr May Paul and the patient's admission status was agreed to be Admission Status: inpatient status to the service of Dr May Paul              Follow-up Information    None         Current Discharge Medication List      CONTINUE these medications which have NOT CHANGED    Details   amLODIPine (NORVASC) 10 mg tablet TAKE 1 TABLET BY MOUTH  DAILY  Qty: 90 tablet, Refills: 3    Comments: Requesting 1 year supply  Associated Diagnoses: Essential hypertension      aspirin 81 mg chewable tablet Chew 81 mg daily      atorvastatin (LIPITOR) 40 mg tablet TAKE 1 TABLET BY MOUTH  DAILY  Qty: 90 tablet, Refills: 3    Comments: Requesting 1 year supply  Associated Diagnoses: Coronary artery disease involving native coronary artery of native heart without angina pectoris; Mixed hyperlipidemia      ciclopirox (LOPROX) 0 77 % cream Apply topically 2 (two) times a day for 20 days  Qty: 90 g, Refills: 2    Associated Diagnoses: Onychomycosis; Dermatophytosis      cloNIDine (CATAPRES) 0 1 mg tablet take 1 tablet by mouth every 12 hours  Qty: 180 tablet, Refills: 1    Associated Diagnoses: Essential hypertension      clopidogrel (PLAVIX) 75 mg tablet Take 1 tablet (75 mg total) by mouth daily  Qty: 90 tablet, Refills: 1    Associated Diagnoses: Coronary artery disease involving native coronary artery of native heart without angina pectoris      fenofibrate 160 MG tablet TAKE 1 TABLET BY MOUTH  DAILY  Qty: 90 tablet, Refills: 3    Comments: Requesting 1 year supply  Associated Diagnoses: Mixed hyperlipidemia      gabapentin (NEURONTIN) 600 MG tablet TAKE 1 TABLET BY MOUTH  TWICE DAILY  Qty: 180 tablet, Refills: 3    Comments: Requesting 1 year supply  Associated Diagnoses: Radiculopathy of lumbar region; Diabetic polyneuropathy associated with type 2 diabetes mellitus ([facility] ); Metatarsalgia of both feet      glimepiride (AMARYL) 2 mg tablet Take 1 tablet (2 mg total) by mouth 2 (two) times a day  Qty: 180 tablet, Refills: 1    Comments: Requesting 1 year supply  Associated Diagnoses: Type 2 diabetes mellitus with diabetic polyneuropathy, without long-term current use of insulin ([facility])      hydrochlorothiazide (HYDRODIURIL) 12 5 mg tablet TAKE 1 TABLET BY MOUTH DAILY  Qty: 30 tablet, Refills: 11    Comments: Requesting 1 year supply  Associated Diagnoses: Stage 3a chronic kidney disease ([facility] );  Benign hypertension with CKD (chronic kidney disease) stage III ([facility])      isosorbide mononitrate (IMDUR) 60 mg 24 hr tablet Take 1 tablet (60 mg total) by mouth daily  Qty: 90 tablet, Refills: 2    Associated Diagnoses: Stable angina pectoris ([facility])      Jardiance 25 MG TABS TAKE 1 TABLET BY MOUTH IN  THE MORNING  Qty: 90 tablet, Refills: 3    Comments: Requesting 1 year supply  Associated Diagnoses: Type 2 diabetes mellitus with diabetic polyneuropathy, without long-term current use of insulin (H[facility])      losartan (COZAAR) 100 MG tablet TAKE 1 TABLET BY MOUTH  DAILY  Qty: 90 tablet, Refills: 3    Comments: Requesting 1 year supply  Associated Diagnoses: Essential hypertension; History of endovascular stent graft for abdominal aortic aneurysm (AAA)      metFORMIN (GLUCOPHAGE) 500 mg tablet TAKE 1 TABLET BY MOUTH  TWICE DAILY WITH MEALS  Qty: 180 tablet, Refills: 3    Comments: Requesting 1 year supply  Associated Diagnoses: Type 2 diabetes mellitus with diabetic polyneuropathy, without long-term current use of insulin ([facility])      metoprolol succinate (TOPROL-XL) 50 mg 24 hr tablet TAKE 1 TABLET BY MOUTH  DAILY  Qty: 90 tablet, Refills: 3    Comments: Requesting 1 year supply  Associated Diagnoses: Coronary artery disease involving native coronary artery of native heart without angina pectoris; Essential hypertension; History of endovascular stent graft for abdominal aortic aneurysm (AAA)      Multiple Vitamins-Minerals (MULTIVITAMIN MEN 50+ PO) Take by mouth daily      Omega-3 Fatty Acids (fish oil) 1,000 mg Take 4,000 mg by mouth daily       ranolazine (RANEXA) 500 mg 12 hr tablet Take 1 tablet (500 mg total) by mouth 2 (two) times a day  Qty: 60 tablet, Refills: 3    Associated Diagnoses: PVC (premature ventricular contraction)      sitaGLIPtin (JANUVIA) 100 mg tablet Take 100 mg by mouth daily      sulfamethoxazole-trimethoprim (BACTRIM DS) 800-160 mg per tablet Take 1 tablet by mouth every 12 (twelve) hours for 10 days  Qty: 20 tablet, Refills: 0    Associated Diagnoses: Cellulitis of foot, right      Blood Glucose Monitoring Suppl (OneTouch Verio Reflect) w/Device KIT Check blood sugars twice daily  Please substitute with appropriate alternative as covered by patient's insurance   Dx: E11 65  Qty: 1 kit, Refills: 0 Associated Diagnoses: Type 2 diabetes mellitus with diabetic polyneuropathy, without long-term current use of insulin ([facility])      cadexomer iodine (IODOSORB) 0 9 % gel Apply 1 application topically daily as needed for wound care  Qty: 10 g, Refills: 0    Associated Diagnoses: Puncture wound of right foot, initial encounter      glucose blood (OneTouch Verio) test strip Check blood sugars twice daily  Please substitute with appropriate alternative as covered by patient's insurance  Dx: E11 65  Qty: 200 each, Refills: 3    Associated Diagnoses: Type 2 diabetes mellitus with diabetic polyneuropathy, without long-term current use of insulin ([facility])      Insulin Glargine Solostar (Lantus SoloStar) 100 UNIT/ML SOPN Inject 0 22 mL (22 Units total) under the skin every evening  Qty: 15 mL, Refills: 3    Associated Diagnoses: Type 2 diabetes mellitus with diabetic polyneuropathy, without long-term current use of insulin ([facility])      Insulin Pen Needle 32G X 4 MM MISC Use every evening  Qty: 100 each, Refills: 5    Associated Diagnoses: Type 2 diabetes mellitus with diabetic polyneuropathy, without long-term current use of insulin ([facility])      nitroglycerin (NITROSTAT) 0 4 mg SL tablet Place 1 tablet (0 4 mg total) under the tongue every 5 (five) minutes as needed for chest pain  Qty: 30 tablet, Refills: 3    Associated Diagnoses: PVC (premature ventricular contraction); Hypertension, unspecified type      OneTouch Delica Lancets 03K MISC Check blood sugars twice daily  Please substitute with appropriate alternative as covered by patient's insurance  Dx: E11 65  Qty: 200 each, Refills: 3    Associated Diagnoses: Type 2 diabetes mellitus with diabetic polyneuropathy, without long-term current use of insulin ([facility])             No discharge procedures on file      PDMP Review       Value Time User    PDMP Reviewed  Yes 2/17/2022  4:22  [facility],           ED Provider  Electronically Signed by           Sofie Castellanos DO  06/18/23 1547

## 2023-06-18 NOTE — ASSESSMENT & PLAN NOTE
· 6/17 : EKG obtained showing 1st degree AVB with frequent PVC's  · Cardiology rec change metoprolol succinate to metoprolol tartrate 12 5 mg BID  · TSH WNL  · Continue to monitor on telemetry; patient still with frequent PVC's  · Replete electrolytes as needed  · Will give potassium 20 meq today  · Obtain bmp and mag in am

## 2023-06-18 NOTE — PROGRESS NOTES
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%, CAD with prior bypass surgery, vein graft to RCA and vein graft to OM 2 is occluded, AAA repair, history of prostate cancer, who was admitted to [facility] after presenting with worsening shortness of breath  A renal consultation is requested today for assistance in the management of acute kidney injury     1- Acute on chronic kidney injury stage III-present on admission  Native disease hypertension      - Outpatient nephrologist- Dr Celso Henderson  - Baseline creatinine- 1 3 to 1 6 mg/dL creatinine as outpatient on June 1 1 6 mg/dL  - Admission creatinine-2 2 mg/dL  - Urinalysis- on June 1 is 1-2 RBC, no WBC, glucose urea  - Renal imaging--kidney with cyst at lower left kidney  Small renal hypodensities  No hydronephrosis  - PVR 17 cc  - To note patient was on Bactrim as outpatient prior to admission since June 6     Overall, rising creatinine may be in the setting of acute decompensated heart failure but also may have decrease tubular secretion in the setting of Bactrim      Plan  - Agree with holding Bactrim  - Agree with holding thiazide diuretic  - Can change IV diuretic to oral diuretic today  Patient did not receive IV Lasix due to IV issues therefore requested that the patient receive oral Lasix now and start twice daily and reviewed this with nursing team  - I/os; avoid nephrotoxic agents  - No objection to [address] [place] [address] as per cardiology recommendations  Patient is on Jardiance as outpatient    - Agree with holding fenofibrate for now  - Agree with holding losartan for now  - Can reintroduce amlodipine if blood pressures rise above 694 systolic  - Maintain inpatient today from renal standpoint  - Reviewed case with primary team advanced practitioner we are in agreement renal plan     2-electrolytes- stable sodium and potassium     3-acid/base- stable bicarbonate     4-CHF and volume overload- initially requiring oxygen therapy for hypoxia and IV diuresis     - Cardiology team on board  - CT of the chest-moderate septal thickening moderate multifocal groundglass opacity due to interstitial and alveolar edema  - CT scan of the abdomen pelvis without contrast-patchy groundglass opacities may be developing pneumonia or edema  No evidence of small bowel dilation  No hydronephrosis  Bladder is mildly distended  - Echocardiogram- June 17-EF 88%, grade 2 diastolic dysfunction, mild to moderate aortic stenoses, mild mitral regurgitation, moderate to severe elevated RV pressure  - Initially started on IV Lasix twice daily  - Follow daily weight  - June 18-weight is not accurate  Clinically improving  Changed to oral Lasix today      5-CAD with history of bypass surgery with CAD and vein graft also     6- history of prostate cancer with urethral device     - CT scan- as outlined above      7- diabetic foot ulcer-Per primary team     - Patient was on Bactrim started on June 6 as outpatient and was given a 10-day course     8-hypertension-on carvedilol and isosorbide  Blood pressures 150s to 160s  Monitor with diuresis also  Restart carvedilol  Hold amlodipine and losartan for now  SUBJECTIVE / 24H INTERVAL HISTORY:    Blood pressures 590C to 671E systolic  Afebrile  Improving oxygen requirements  Patient states is feeling better overall  No longer shortness of breath  Abdominal distention is improved  3 4 L urine output yesterday    Net -4 L  diuresis    OBJECTIVE:  Current Weight: Weight - Scale: (!) 170 kg (374 lb 12 5 oz)  Vitals:    06/18/23 0356 06/18/23 0500 06/18/23 0539 06/18/23 0824   BP: 137/74   137/79   BP Location:       Pulse: 85   65   Resp:    20   Temp: 98 1 °F (36 7 °C)   97 7 °F (36 5 °C)   SpO2: 95%   93% Weight:  (!) 170 kg (374 lb 12 5 oz) (!) 170 kg (374 lb 12 5 oz)    Height:           Intake/Output Summary (Last 24 hours) at 6/18/2023 0955  Last data filed at 6/17/2023 2143  Gross per 24 hour   Intake 1110 ml   Output 3430 ml   Net -2320 ml     General: NAD  Skin: no rash  Eyes: anicteric sclera  ENT: moist mucous membrane  Neck: supple  Chest: CTA b/l, no ronchii, no wheeze, no rubs, no rales  CVS: s1s2, no murmur, no gallop, no rub  Abdomen: soft, nontender, nl sounds  Extremities: no significant pitting edema LE b/l  : no gilbert  Neuro: AAOX3  Psych: normal affect    Medications:    Current Facility-Administered Medications:   •  acetaminophen (TYLENOL) tablet 650 mg, 650 mg, Oral, Q6H PRN, Perry Zhang DO, 650 mg at 06/17/23 0846  •  aspirin chewable tablet 81 mg, 81 mg, Oral, Daily, Perry Zhang DO, 81 mg at 06/18/23 9263  •  atorvastatin (LIPITOR) tablet 40 mg, 40 mg, Oral, Daily With Dinner, Perry Zhang DO, 40 mg at 06/17/23 1641  •  cloNIDine (CATAPRES) tablet 0 1 mg, 0 1 mg, Oral, Q12H [address], Brandy Saldivar MD, 0 1 mg at 06/18/23 0935  •  clopidogrel (PLAVIX) tablet 75 mg, 75 mg, Oral, Daily, Perry Zhang DO, 75 mg at 06/18/23 8497  •  furosemide (LASIX) tablet 20 mg, 20 mg, Oral, BID (diuretic), Brandy Saldivar MD  •  heparin (porcine) subcutaneous injection 5,000 Units, 5,000 Units, Subcutaneous, Q8H [address], Perry Zhang DO, 5,000 Units at 06/18/23 0534  •  insulin glargine (LANTUS) subcutaneous injection 10 Units 0 1 mL, 10 Units, Subcutaneous, HS, Perry Zhang DO, 10 Units at 06/17/23 2138  •  insulin lispro (HumaLOG) 100 units/mL subcutaneous injection 1-5 Units, 1-5 Units, Subcutaneous, TID AC, 1 Units at 06/17/23 1645 **AND** Fingerstick Glucose (POCT), , , TID AC, Perry Zhang DO  •  insulin lispro (HumaLOG) 100 units/mL subcutaneous injection 1-5 Units, 1-5 Units, Subcutaneous, HS, Perry Zhang DO  •  isosorbide mononitrate (IMDUR) 24 hr tablet 60 mg, 60 mg, Oral, Daily, Perry Zhang DO, 60 mg at 06/18/23 8627  •  metoprolol tartrate (LOPRESSOR) tablet 25 mg, 25 mg, Oral, Q12H [address], Concepc[place] Gu, CRNP, 25 mg at 06/18/23 8035  •  ranolazine (RANEXA) 12 hr tablet 500 mg, 500 mg, Oral, BID, Perry Zhang DO, 500 mg at 06/18/23 2329  •  tamsulosin (FLOMAX) capsule 0 4 mg, 0 4 mg, Oral, Daily With Dinner, Sincere Malcolm DO, 0 4 mg at 06/17/23 1734  •  trimethobenzamide (TIGAN) IM injection 200 mg, 200 mg, Intramuscular, Q6H PRN, Sincere Malcolm DO, 200 mg at 06/18/23 0824    Laboratory Results:  Results from last 7 days   Lab Units 06/18/23  0527 06/17/23  0519 06/16/23  1803   WBC Thousand/uL  --  10 71* 10 46*   HEMOGLOBIN g/dL  --  11 9* 11 9*   HEMATOCRIT %  --  37 1 36 8   PLATELETS Thousands/uL  --  213 226   POTASSIUM mmol/L 3 7 4 0 4 5   CHLORIDE mmol/L 101 103 101   CO2 mmol/L 26 25 24   BUN mg/dL 42* 45* 43*   CREATININE mg/dL 1 97* 2 25* 2 23*   CALCIUM mg/dL 9 2 9 0 8 9   MAGNESIUM mg/dL 2 3 2 2 2 2   PHOSPHORUS mg/dL 2 6  --   --

## 2023-06-18 NOTE — ASSESSMENT & PLAN NOTE
Lab Results   Component Value Date    HGBA1C 6 3 (H) 06/01/2023       Recent Labs     06/17/23  1604 06/17/23 2052 06/18/23  0707 06/18/23  1106   POCGLU 161* 149* 115 216*       Blood Sugar Average: Last 72 hrs:  · (P) 989 [id number] on metabolic panel 79  ·  Decrease PTA glargine to 10 units nightly instead of 22 units nightly  · SSI with meals  · Hold oral hypoglycemic agents

## 2023-06-18 NOTE — ASSESSMENT & PLAN NOTE
Wt Readings from Last 3 Encounters:   06/18/23 (!) 170 kg (374 lb 12 5 oz)   06/15/23 114 kg (252 lb)   06/08/23 113 kg (250 lb)     · Shortness of breath, abdominal distention, lower extremity edema  · BNP elevated, CT chest without contrast with evidence of pulmonary edema  · EKG sinus, rate 79, first-degree AV block with frequent PVCs  · Initial troponin negative  · Echo in July 2022 with LV EF 50-55%  Diastolic function mildly abnormal, consistent with grade 1 relaxation  Mild aortic stenosis    Aortic root mildly dilated  · Noted IVY on CKD  · Given IV Lasix 40 mg in the ER  · Not on any PTA Lasix or Bumex, does appear patient is on PTA HCTZ  · Echo 6/17:  LVEF 97%, reduced systolic function with grade 2 diastolic dysfunction with mild to moderate AV stenosis   · Significant improvement with lasix okay to switch to oral today; patient doing well on room air   · IV lasix switched to p o  lasix 20 mg BID  · Monitor I/O and standing weights  · See IVY on CKD section  · Supplemental O2 as needed  · Cardiology following

## 2023-06-18 NOTE — ASSESSMENT & PLAN NOTE
Lab Results   Component Value Date    HGBA1C 6 3 (H) 06/01/2023       Recent Labs     06/17/23  1604 06/17/23 2052 06/18/23  0707 06/18/23  1106   POCGLU 161* 149* 115 216*       Blood Sugar Average: Last 72 hrs:  · (P) 132 875   · Has a small diabetic ulcer on the right side of the foot which patient reports he saw his podiatrist for about a week and a half ago and was put on Bactrim for a reported infection started on 6/6 for 10 days  · Does not appear acutely infected with surrounding cellulitis  · Hold bactrim in setting of IVY on CKD   Patient did no receive full abx course   · Follow up with podiatry as outpatient

## 2023-06-18 NOTE — PLAN OF CARE
Problem: MOBILITY - ADULT  Goal: Maintain or return to baseline ADL function  Description: INTERVENTIONS:  -  Assess patient's ability to carry out ADLs; assess patient's baseline for ADL function and identify physical deficits which impact ability to perform ADLs (bathing, care of mouth/teeth, toileting, grooming, dressing, etc )  - Assess/evaluate cause of self-care deficits   - Assess range of motion  - Assess patient's mobility; develop plan if impaired  - Assess patient's need for assistive devices and provide as appropriate  - Encourage maximum independence but intervene     Outcome: Progressing  Goal: Maintains/Returns to pre admission functional level  Description: INTERVENTIONS:  - Perform BMAT or MOVE assessment daily    - Set and communicate daily mobility goal to care team and patient/family/caregiver  - Collaborate with rehabilitation services on mobility goals   Outcome: Progressing     Problem: SAFETY ADULT  Goal: Maintain or return to baseline ADL function  Description: INTERVENTIONS:  -  Assess patient's ability to carry out ADLs; assess patient's baseline for ADL function and identify physical deficits which impact ability to perform ADLs (bathing, care of mouth/teeth, toileting, grooming, dressing, etc )  - Assess/evaluate cause of self-care deficits   - Assess range of motion  - Assess patient's mobility; develop plan if impaired  - Assess patient's need for assistive devices and provide as appropriate  - Encourage maximum independence but intervene     Outcome: Progressing  Goal: Maintains/Returns to pre admission functional level  Description: INTERVENTIONS:  - Perform BMAT or MOVE assessment daily    - Set and communicate daily mobility goal to care team and patient/family/caregiver     - Collaborate with rehabilitation services on mobility goals   Outcome: Progressing  Goal: Patient will remain free of falls  Description: INTERVENTIONS:  - Educate patient/family on patient safety including physical limitations  - Instruct patient to call for assistance with activity   - Consult OT/PT to assist with strengthening/mobility   - Keep Call bell within reach  - Keep bed low and locked with side rails adjusted as appropriate  - Keep care items and personal belongings within reach  - Initiate and maintain comfort rounds  - Make Fall Risk Sign visible to staff  - Offer Toileting every 2 Hours, in advance of need  - Initiate/Maintain bed alarm  - Obtain necessary fall risk management equipment: slipper socks  - Apply yellow socks and bracelet for high fall risk patients  - Consider moving patient to room near nurses station  Outcome: Progressing     Problem: CARDIOVASCULAR - ADULT  Goal: Maintains optimal cardiac output and hemodynamic stability  Description: INTERVENTIONS:  - Monitor I/O, vital signs and rhythm  - Monitor for S/S and trends of decreased cardiac output  - Administer and titrate ordered vasoactive medications to optimize hemodynamic stability  - Assess quality of pulses, skin color and temperature  - Assess for signs of decreased coronary artery perfusion  - Instruct patient to report change in severity of symptoms  Outcome: Progressing  Goal: Absence of cardiac dysrhythmias or at baseline rhythm  Description: INTERVENTIONS:  - Continuous cardiac monitoring, vital signs, obtain 12 lead EKG if ordered  - Administer antiarrhythmic and heart rate control medications as ordered  - Monitor electrolytes and administer replacement therapy as ordered  Outcome: Progressing     Problem: RESPIRATORY - ADULT  Goal: Achieves optimal ventilation and oxygenation  Description: INTERVENTIONS:  - Assess for changes in respiratory status  - Assess for changes in mentation and behavior  - Position to facilitate oxygenation and minimize respiratory effort  - Oxygen administered by appropriate delivery if ordered  - Initiate smoking cessation education as indicated  - Encourage broncho-pulmonary hygiene including cough, deep breathe, Incentive Spirometry  - Assess the need for suctioning and aspirate as needed  - Assess and instruct to report SOB or any respiratory difficulty  - Respiratory Therapy support as indicated  Outcome: Progressing     Problem: GENITOURINARY - ADULT  Goal: Maintains or returns to baseline urinary function  Description: INTERVENTIONS:  - Assess urinary function  - Encourage oral fluids to ensure adequate hydration if ordered  - Administer IV fluids as ordered to ensure adequate hydration    Outcome: Progressing  Goal: Absence of urinary retention  Description: INTERVENTIONS:  - Assess patient's ability to void and empty bladder  - Monitor I/O  - Bladder scan as needed  - Discuss with physician/AP medications to alleviate retention as needed  - Discuss catheterization for long term situations as appropriate  Outcome: Progressing

## 2023-06-18 NOTE — ASSESSMENT & PLAN NOTE
· Follows with Dr Birgit Serrano outpatient and saw about a week and a half ago; denies any chest pain today or abdominal pain  · Per Dr Mehdi Isaac last note history of triple-vessel bypass with last catheterization in November 2022 which showed only a patent LIMA to LAD  Proximal % occluded, proximal % occluded  Vein graft % occluded  Obtuse marginal 200% occluded  Vein graft to OM T2, 100% occluded  AV groove circumflex widely patent which feeds posterior lateral breaks  Patient has grade 1 to grade 2 collaterals to right circulation from the left circulation  EF is 50%  · Per last cardiology note patient is on aspirin and Plavix for 6 months and then will only be on aspirin  · Continue PTA aspirin, Plavix, statin  · Continuing PTA metoprolol  · We are holding patient's other antihypertensive agents as we are aggressively diuresing patient to avoid provoking hypotension      · If patient has elevated blood pressures, we will systematically add them back on  · CV team consulted

## 2023-06-18 NOTE — ASSESSMENT & PLAN NOTE
Lab Results   Component Value Date    EGFR 31 06/18/2023    EGFR 26 06/17/2023    EGFR 26 06/16/2023    CREATININE 1 97 (H) 06/18/2023    CREATININE 2 25 (H) 06/17/2023    CREATININE 2 23 (H) 06/16/2023   · Creatinine 2 2 POA, baseline appears to be 1 5  · History of CKD stage II and follows with Dr Farshad Mortensen outpatient; per review of chart appears the etiology of CKD suspected to be hypertensive nephrosclerosis and diabetic kidney disease  · History of prostate cancer and has urethral device that assists with the opening of the sphincter  · CT abdomen pelvis without contrast: Patchy ggo in the a peribronchovascular distribution maybe related to edema or developing pna  No evidence of small bowel dilatation  No hydronephrosis The bladder is mildly distended     · Urine Albumin/Creatinine: normal  · Cr down trending    · Bladder scan 17 ml  · Avoid hypotension   · Continue to hold nephrotoxic medication   · Nephrology following

## 2023-06-18 NOTE — UTILIZATION REVIEW
Initial Clinical Review    Admission: Date/Time/Statement:   Admission Orders (From admission, onward)     Ordered        06/16/23 2007  Inpatient Admission  Once                   Orders Placed This Encounter   Procedures   • Inpatient Admission     Standing Status:   Standing     Number of Occurrences:   1     Order Specific Question:   Level of Care     Answer:   Med Surg [16]     Order Specific Question:   Estimated length of stay     Answer:   More than 2 Midnights     Order Specific Question:   Certification     Answer:   I certify that inpatient services are medically necessary for this patient for a duration of greater than two midnights  See H&P and MD Progress Notes for additional information about the patient's course of treatment  ED Arrival Information     Expected   -    Arrival   6/16/2023 16:43    Acuity   Urgent            Means of arrival   Walk-In    Escorted by   Family Member    Service   Hospitalist    Admission type   Emergency            Arrival complaint   foot infection         Chief Complaint   Patient presents with   • Shortness of Breath     SOB that started today, being treated for a foot wound recently but states this is new     Initial Presentation:    [de-identified] y/o male recently  a 1 month with PMHX HTN, CAD w/ CABG x 3, chronic combined ischemic CHF, AAA repair, DM with diabetic foot ulcer + recently started on Bactim for possible cellulitis,  CKD Stage 3, , prostatectomy for prostate cancer - presents to [facility] ED on 6/16/23 2nd 2 day history of worsening SOB, abdominal distention and lower extremity edema  Daughters report he was struggling to breathe  States his wife passed away about a month ago and it has been difficult  In ED - RR 24  SpO2 92 % w/ 2 L NC O2    Exam: diffuse bilateral crackles, abdomen distended, 2 +bilateral pitting edema, right plantar diabetic foot ulcer without surrounding erythema or fluctuance,, Chest xray + CT show moderate septal thickening and multifocal groundglass opacity due to interstitial and alveolar edema  Labs: WBC 10,460  BS   BUN/ CR 43/ 2 23  Troponin neg  BNP  890  ED Tx: IV Lasix 40 mg  Admitted Inpatient 6/16/23 at 2007  2nd Acute on Chronic CHF - O2 prn, IV Lasix BID, strict I+O; Cardiology Consult; IVY on CKD - Renal Consult -        6/17 CARDIOLOGY:  Assessment/Plan   Acute on chronic combined ischemic heart failure EF 45 to 50%- reports increased volume over the past couple of days  Followed closely with nephrology and cardiology  He was not on a loop diuretic as an outpatient and denies significant dietary indiscretion  He will need to be on a loop diuretic on discharge  Continue his Jardiance therapy  Recommend discontinuing  hydrochlorothiazide    Frequent PVCs-possibly secondary to volume overload  Recommend continuing his beta-blocker therapy    Coronary artery disease with history of bypass grafting status post cardiac cath in November 2022 showing patent DAWSON to LAD  His native LAD and RCA are occluded  His vein graft to his RCA is 100% occluded  His vein graft to his OM 2 is 100% occluded    Kidney disease stage II-III   Nominal aortic aneurysm and peripheral vascular disease status post repair    6/17/23 RENAL:  ASSESSMENT and PLAN:   [de-identified] y o  male with a past medical history of CKD stage III, chronic combined ischemic CHF, CAD with prior bypass surgery, vein graft to RCA and vein graft to OM 2 is occluded, AAA repair, history of prostate cancer, who was admitted to [facility] after presenting with worsening shortness of breath  Renal consultation is requested for management of acute kidney injury     1- Acute on chronic kidney injury stage III-present on admission    Native disease hypertension    -- Baseline creatinine- 1 3 to 1 6 mg/dL creatinine as outpatient on June 1 1 6 mg/dL  - Admission creatinine-2 2 mg/dL  - Urinalysis- on June 1 is 1-2 RBC, no WBC, glucose urea  - Renal imaging-  - To note patient was on Bactrim as outpatient prior to admission since June 6     Overall, rising creatinine may be in the setting of acute decompensated heart failure but also may have decrease tubular secretion in the setting of Bactrim      Plan  - Agree with holding Bactrim  - Follow-up  CT scan without contrast results  - Follow PVR  - Check urinalysis  - Agree with holding thiazide diuretic  - Agree with loop diuretic IV per primary team and cardiology team  - Daily standing weight  - I/os; avoid nephrotoxic agents  - No objection to Jardiance to continue for now given benefit and acute heart failure but if GFR continues to decrease, may need to consider holding  - Agree with holding fenofibrate for now  - Restart clonidine to avoid rebound hypertension-ordered  - Agree with holding losartan for now  - Can reintroduce amlodipine if blood pressures remain elevated after clonidine restarted     2-electrolytes- stable sodium and potassium   3-acid/base- stable bicarbonate   4-CHF and volume overload- initially requiring oxygen therapy for hypoxia and IV diuresis     6/18/23 - DAY 3  Has surpassed a 2nd midnight with active treatments and services  Cardiology: Significant improvement in his shortness of breath   good response to lasix  Still with frequent monomorphic PVCs    Renal:    Blood pressures 613J to 255M systolic  Afebrile  Improving oxygen requirements  Feeling better overall  No longer shortness of breath  Abdominal distention is improved  3 4 L urine output yesterday  Net -4 L  diuresis  Overall, rising creatinine may be in the setting of acute decompensated heart failure but also may have decrease tubular secretion in the setting of Bactrim    Continue plan per 6/17/23    ED Triage Vitals   Temperature Pulse Respirations Blood Pressure SpO2   06/16/23 1654 06/16/23 1654 06/16/23 1654 06/16/23 1654 06/16/23 1654   99 8 °F (37 7 °C) 79 (!) 24 (!) 175/75 92 % w/ 2 L NC O2      Temp src Heart Rate Source Patient Position - Orthostatic VS BP Location FiO2 (%)   -- 06/16/23 1912 06/16/23 1912 06/16/23 1912 --    Monitor Lying Left arm       Wt Readings from Last 1 Encounters:   06/18/23 108 kg (238 lb)     Additional Vital Signs:  Date/Time Temp Pulse Resp BP MAP (mmHg) SpO2 Calculated FIO2 (%) - Nasal Cannula Nasal Cannula O2 Flow Rate (L/min) O2 Device Patient Position - Orthostatic VS   06/18/23 15:14:51 98 3 °F (36 8 °C) 70 18 109/71 84 93 % -- -- -- --   06/18/23 08:24:22 97 7 °F (36 5 °C) 65 20 137/79 98 93 % -- -- -- --   06/18/23 03:56:20 98 1 °F (36 7 °C) 85 -- 137/74 95 95 % -- -- -- --   06/17/23 2143 -- -- -- -- -- -- 28 2 L/min Nasal cannula --   06/17/23 21:36:23 -- 84 18 131/73 92 90 % -- -- -- --   06/17/23 18:42:26 98 6 °F (37 °C) 75 18 150/60 90 95 % -- -- -- --   06/17/23 1705 -- 71 -- 159/70 100 97 % -- -- -- --   06/17/23 1536 -- 94 -- -- -- 90 % -- -- None (Room air) --   06/17/23 1450 98 °F (36 7 °C) 99 18 160/69 -- 93 % 28 2 L/min Nasal cannula --   06/17/23 1100 -- 91 -- -- -- -- -- -- -- --   06/17/23 10:35:08 -- 88 -- 152/64 93 91 % -- -- -- --   06/17/23 1000 -- 88 -- -- -- -- -- -- -- --   06/17/23 0900 98 °F (36 7 °C) 57 -- 166/62 97 91 % -- -- -- --   06/17/23 08:49:52 98 9 °F (37 2 °C) 81 -- 166/62 97 90 % -- -- -- --   06/17/23 0830 -- 80 -- 166/62 -- -- -- -- -- --   06/17/23 05:18:17 98 5 °F (36 9 °C) 54 Abnormal  19 139/64 89 -- -- -- -- --   06/16/23 22:38:41 -- 52 Abnormal  19 151/67 95 94 % -- -- -- --   06/16/23 21:13:23 98 8 °F (37 1 °C) 35 Abnormal  23 Abnormal  137/58 84 93 % -- -- -- --   06/16/23 2030 -- 68 20 142/66 95 92 % -- 2 L Nasal cannula  Lying   O2 Device: 2 L/min at 06/16/23 2030 06/16/23 2012 -- 67 20 142/64 92 92 % -- 2 L Nasal cannula  Lying   O2 Device: 2 L/min at 06/16/23 2012 06/16/23 1912 -- 78 22 144/90 110 92 % -- 2 L Nasal cannula  Lying   O2 Device: 2 L/min at 06/16/23 1912 06/16/23 1654 99 8 °F (37 7 °C) 79 24 Abnormal  175/75 Abnormal  -- 92 % -- 2 L Nasal  cannula --      06/16 0701 06/17 0700 06/17 0701 06/18 0700 06/18 0701   -   P O   1100 500   I V  (mL/kg)  10 (0 1)    Total Intake(mL/kg)  1110 (6 5) 500 (4 6)   Urine (mL/kg/hr) 1700 3430 (0 8) 500 (0 5)   Total Output 1700 3430 500   Net -1700 -2320 0     Pertinent Labs/Diagnostic Test Results:   CT abdomen pelvis wo contrast   Patchy groundglass opacities in a peribronchovascular distribution may be related to developing pneumonia or edema  No evidence of small bowel dilatation  No hydronephrosis  The bladder is only mildly distended although somewhat more distended than the study of May  CT chest without contrast   Moderate septal thickening and moderate multifocal groundglass opacity due to interstitial and alveolar edema  XR chest 1 view portable   Mild CHF       Results from last 7 days   Lab Units 06/16/23  1807   SARS-COV-2  Negative     Results from last 7 days   Lab Units 06/17/23  0519 06/16/23  1803   WBC Thousand/uL 10 71* 10 46*   HEMOGLOBIN g/dL 11 9* 11 9*   HEMATOCRIT % 37 1 36 8   PLATELETS Thousands/uL 213 226   NEUTROS ABS Thousands/µL 8 51* 8 24*     Results from last 7 days   Lab Units 06/18/23  0527 06/17/23  0519 06/16/23  1803   SODIUM mmol/L 137 136 133*   POTASSIUM mmol/L 3 7 4 0 4 5   CHLORIDE mmol/L 101 103 101   CO2 mmol/L 26 25 24   ANION GAP mmol/L 10 8 8   BUN mg/dL 42* 45* 43*   CREATININE mg/dL 1 97* 2 25* 2 23*   EGFR ml/min/1 73sq m 31 26 26   CALCIUM mg/dL 9 2 9 0 8 9   MAGNESIUM mg/dL 2 3 2 2 2 2   PHOSPHORUS mg/dL 2 6  --   --      Results from last 7 days   Lab Units 06/17/23  0519 06/16/23  1803   AST U/L 14 19   ALT U/L 13 18   ALK PHOS U/L 34 36   TOTAL PROTEIN g/dL 7 1 7 6   ALBUMIN g/dL 3 7 3 8   TOTAL BILIRUBIN mg/dL 0 69 0 69     Results from last 7 days   Lab Units 06/18/23  1550 06/18/23  1106 06/18/23  0707 06/17/23 2052 06/17/23  1604 06/17/23  1124 06/17/23  0659 06/16/23  2233 06/16/23  2152   POC GLUCOSE mg/dl 214* 216* 115 149* 161* 176* 91 89 66     Results from last 7 days   Lab Units 06/18/23  0527 06/17/23  0519 06/16/23  1803   GLUCOSE RANDOM mg/dL 121 83 79     Results from last 7 days   Lab Units 06/16/23  2251 06/16/23 2001 06/16/23  1803   HS TNI 0HR ng/L  --   --  39   HS TNI 2HR ng/L  --  42  --    HSTNI D2 ng/L  --  3  --    HS TNI 4HR ng/L 40  --   --    HSTNI D4 ng/L 1  --   --      Results from last 7 days   Lab Units 06/17/23  0519 06/16/23  1803   PROTIME seconds 15 9* 15 4*   INR  1 26* 1 20*   PTT seconds 34 33     Results from last 7 days   Lab Units 06/17/23  0519   TSH 3RD GENERATON uIU/mL 1 536     Results from last 7 days   Lab Units 06/18/23 0527 06/17/23  0519   PROCALCITONIN ng/ml 0 15 0 12     Results from last 7 days   Lab Units 06/16/23  1803   LACTIC ACID mmol/L 1 0     Results from last 7 days   Lab Units 06/16/23  1803   BNP pg/mL 890*     Results from last 7 days   Lab Units 06/17/23  1459 06/17/23  0048   CLARITY UA  Clear  --    COLOR UA  Yellow  --    SPEC GRAV UA  1 010  --    PH UA  5 5  --    GLUCOSE UA mg/dl >=1000 (1%)*  --    KETONES UA mg/dl Negative  --    BLOOD UA  Negative  --    PROTEIN UA mg/dl Negative  --    NITRITE UA  Negative  --    BILIRUBIN UA  Negative  --    UROBILINOGEN UA E U /dl 0 2  --    LEUKOCYTES UA  Elevated glucose may cause decreased leukocyte values  See urine microscopic for UWBC result*  --    WBC UA /hpf None Seen  --    RBC UA /hpf 0-1*  --    BACTERIA UA /hpf None Seen  --    EPITHELIAL CELLS WET PREP /hpf None Seen  --    SODIUM UR   --  81   CREATININE UR mg/dL  --  33 5  35 7     Results from last 7 days   Lab Units 06/16/23 1807   INFLUENZA A PCR  Negative   INFLUENZA B PCR  Negative   RSV PCR  Negative     Results from last 7 days   Lab Units 06/16/23 1803   BLOOD CULTURE  No Growth at 24 hrs  No Growth at 24 hrs       ED Treatment:   Medication Administration from 06/16/2023 1643 to 06/16/2023 2056       Date/Time Order Dose Route Action 06/16/2023 2013 EDT furosemide (LASIX) injection 40 mg 40 mg Intravenous Given     Past Medical History:   Diagnosis Date   • CAD (coronary artery disease)    • Cancer ([facility])     prostate   • CHF (congestive heart failure) ([facility])    • Diabetes mellitus ([facility] )    • Myocardial infarction ([facility] )      Present on Admission:  • Mild aortic stenosis  • Diabetic ulcer of right midfoot associated with type 2 diabetes mellitus, limited to breakdown of skin ([facility])  • Frequent PVCs    Admitting Diagnosis: Shortness of breath [R06 02]  CHF (congestive heart failure) ([facility]) [I50 9]  Hypoxia [R09 02]  Diabetic ulcer of right midfoot associated with type 2 diabetes mellitus, limited to breakdown of skin ([place] [id number] ) [K72 130, L97 411]    Age/Sex: [de-identified] y o  male    Admission Orders:  Telemetry  VS q4hrs  Nasal O2 at 2 L/min prn  - Titrate SpO2 > 92 %  Continuous Pulse Oximetry  SCD  Up + OOB as tolerated  Diet Cardiovascular; Cardiac; Fluid Restriction 1800 ML  BBG qid before meals + Bt  I+O q shift  Daily weight  Serial HS Troponin q2hrs x 3  PT + OT Evals    Scheduled Medications:  aspirin, 81 mg, Oral, Daily  atorvastatin, 40 mg, Oral, Daily With Dinner  cloNIDine, 0 1 mg, Oral, Q12H [address]  clopidogrel, 75 mg, Oral, Daily  IV furosemide,40 mg, Intravenous, BID (diuretic)  heparin (porcine), 5,000 Units, Subcutaneous, Q8H CONSTANTINE  insulin glargine, 10 Units, Subcutaneous, HS  insulin lispro, 1-5 Units, Subcutaneous, TID AC  insulin lispro, 1-5 Units, Subcutaneous, HS  isosorbide mononitrate, 60 mg, Oral, Daily  metoprolol tartrate, 25 mg, Oral, Q12H CONSTANTINE  ranolazine, 500 mg, Oral, BID  tamsulosin, 0 4 mg, Oral, Daily With Dinner    Continuous IV Infusions:  NONE    PRN Meds:  acetaminophen, 650 mg, Oral, Q6H PRN - 6/17 X 1  trimethobenzamide, 200 mg, Intramuscular, Q6H PRN - 6/17 X 1, 6/18 X 2    IP CONSULT TO NUTRITION SERVICES  IP CONSULT TO CARDIOLOGY  IP CONSULT TO NEPHROLOGY    Network Utilization Review Department  ATTENTION: Please call with any questions or concerns to [phone] and carefully listen to the prompts so that you are directed to the right person  All voicemails are confidential   Ana Crocker all requests for admission clinical reviews, approved or denied determinations and any other requests to dedicated fax number below belonging to the campus where the patient is receiving treatment   List of dedicated fax numbers for the Facilities:  [address] DENIALS (Administrative/Medical Necessity) [phone]   [address] (Maternity/NICU/Pediatrics) [phone]   910 Dorcas Natarajan [phone]   [facility] [phone]   [address] [address] Jenaette Potter 28 [phone]   [facility] Steve Mcpherson [facility] 134 815 [facility] [phone]

## 2023-06-19 ENCOUNTER — TELEPHONE (OUTPATIENT)
Dept: NEPHROLOGY | Facility: CLINIC | Age: 78
End: 2023-06-19

## 2023-06-19 ENCOUNTER — TRANSITIONAL CARE MANAGEMENT (OUTPATIENT)
Dept: FAMILY MEDICINE CLINIC | Facility: CLINIC | Age: 78
End: 2023-06-19

## 2023-06-19 VITALS
WEIGHT: 232.4 LBS | SYSTOLIC BLOOD PRESSURE: 143 MMHG | RESPIRATION RATE: 18 BRPM | TEMPERATURE: 97.4 F | DIASTOLIC BLOOD PRESSURE: 72 MMHG | HEART RATE: 69 BPM | OXYGEN SATURATION: 94 % | HEIGHT: 74 IN | BODY MASS INDEX: 29.82 KG/M2

## 2023-06-19 DIAGNOSIS — I20.8 STABLE ANGINA PECTORIS (HCC): ICD-10-CM

## 2023-06-19 PROBLEM — R11.0 NAUSEA: Status: ACTIVE | Noted: 2023-06-19

## 2023-06-19 LAB
ANION GAP SERPL CALCULATED.3IONS-SCNC: 10 MMOL/L (ref 4–13)
BUN SERPL-MCNC: 40 MG/DL (ref 5–25)
CALCIUM SERPL-MCNC: 9 MG/DL (ref 8.4–10.2)
CHLORIDE SERPL-SCNC: 102 MMOL/L (ref 96–108)
CO2 SERPL-SCNC: 25 MMOL/L (ref 21–32)
CREAT SERPL-MCNC: 1.62 MG/DL (ref 0.6–1.3)
GFR SERPL CREATININE-BSD FRML MDRD: 39 ML/MIN/1.73SQ M
GLUCOSE SERPL-MCNC: 147 MG/DL (ref 65–140)
GLUCOSE SERPL-MCNC: 149 MG/DL (ref 65–140)
GLUCOSE SERPL-MCNC: 179 MG/DL (ref 65–140)
GLUCOSE SERPL-MCNC: 235 MG/DL (ref 65–140)
MAGNESIUM SERPL-MCNC: 2.2 MG/DL (ref 1.9–2.7)
POTASSIUM SERPL-SCNC: 3.8 MMOL/L (ref 3.5–5.3)
SODIUM SERPL-SCNC: 137 MMOL/L (ref 135–147)

## 2023-06-19 PROCEDURE — 97162 PT EVAL MOD COMPLEX 30 MIN: CPT

## 2023-06-19 PROCEDURE — 99232 SBSQ HOSP IP/OBS MODERATE 35: CPT | Performed by: INTERNAL MEDICINE

## 2023-06-19 PROCEDURE — 99239 HOSP IP/OBS DSCHRG MGMT >30: CPT

## 2023-06-19 PROCEDURE — 94762 N-INVAS EAR/PLS OXIMTRY CONT: CPT

## 2023-06-19 PROCEDURE — 80048 BASIC METABOLIC PNL TOTAL CA: CPT | Performed by: INTERNAL MEDICINE

## 2023-06-19 PROCEDURE — 82948 REAGENT STRIP/BLOOD GLUCOSE: CPT

## 2023-06-19 PROCEDURE — 97165 OT EVAL LOW COMPLEX 30 MIN: CPT

## 2023-06-19 PROCEDURE — 83735 ASSAY OF MAGNESIUM: CPT | Performed by: NURSE PRACTITIONER

## 2023-06-19 RX ORDER — TORSEMIDE 20 MG/1
20 TABLET ORAL DAILY
Qty: 30 TABLET | Refills: 0 | Status: SHIPPED | OUTPATIENT
Start: 2023-06-19

## 2023-06-19 RX ORDER — LANOLIN ALCOHOL/MO/W.PET/CERES
3 CREAM (GRAM) TOPICAL ONCE
Status: COMPLETED | OUTPATIENT
Start: 2023-06-19 | End: 2023-06-19

## 2023-06-19 RX ORDER — METOPROLOL TARTRATE 50 MG/1
50 TABLET, FILM COATED ORAL EVERY 12 HOURS SCHEDULED
Status: DISCONTINUED | OUTPATIENT
Start: 2023-06-19 | End: 2023-06-19 | Stop reason: HOSPADM

## 2023-06-19 RX ORDER — INSULIN GLARGINE 100 [IU]/ML
15 INJECTION, SOLUTION SUBCUTANEOUS EVERY EVENING
Qty: 15 ML | Refills: 0
Start: 2023-06-19 | End: 2023-12-16

## 2023-06-19 RX ORDER — EMPAGLIFLOZIN 10 MG/1
10 TABLET, FILM COATED ORAL EVERY MORNING
Qty: 30 TABLET | Refills: 0 | OUTPATIENT
Start: 2023-06-19

## 2023-06-19 RX ORDER — ONDANSETRON 4 MG/1
4 TABLET, FILM COATED ORAL EVERY 8 HOURS PRN
Qty: 10 TABLET | Refills: 0 | Status: SHIPPED | OUTPATIENT
Start: 2023-06-19

## 2023-06-19 RX ORDER — METOPROLOL TARTRATE 50 MG/1
50 TABLET, FILM COATED ORAL EVERY 12 HOURS SCHEDULED
Qty: 60 TABLET | Refills: 0 | Status: SHIPPED | OUTPATIENT
Start: 2023-06-19

## 2023-06-19 RX ADMIN — INSULIN LISPRO 2 UNITS: 100 INJECTION, SOLUTION INTRAVENOUS; SUBCUTANEOUS at 12:07

## 2023-06-19 RX ADMIN — Medication 3 MG: at 00:37

## 2023-06-19 RX ADMIN — FUROSEMIDE 20 MG: 20 TABLET ORAL at 08:11

## 2023-06-19 RX ADMIN — ISOSORBIDE MONONITRATE 60 MG: 60 TABLET, EXTENDED RELEASE ORAL at 08:11

## 2023-06-19 RX ADMIN — ASPIRIN 81 MG CHEWABLE TABLET 81 MG: 81 TABLET CHEWABLE at 08:11

## 2023-06-19 RX ADMIN — HEPARIN SODIUM 5000 UNITS: 5000 INJECTION INTRAVENOUS; SUBCUTANEOUS at 05:57

## 2023-06-19 RX ADMIN — INSULIN LISPRO 1 UNITS: 100 INJECTION, SOLUTION INTRAVENOUS; SUBCUTANEOUS at 08:11

## 2023-06-19 RX ADMIN — TRIMETHOBENZAMIDE HYDROCHLORIDE 200 MG: 100 INJECTION INTRAMUSCULAR at 08:11

## 2023-06-19 RX ADMIN — RANOLAZINE 500 MG: 500 TABLET, FILM COATED, EXTENDED RELEASE ORAL at 08:11

## 2023-06-19 RX ADMIN — ACETAMINOPHEN 650 MG: 325 TABLET ORAL at 06:02

## 2023-06-19 RX ADMIN — CLOPIDOGREL BISULFATE 75 MG: 75 TABLET ORAL at 08:12

## 2023-06-19 RX ADMIN — CLONIDINE HYDROCHLORIDE 0.1 MG: 0.1 TABLET ORAL at 08:11

## 2023-06-19 RX ADMIN — METOPROLOL TARTRATE 25 MG: 25 TABLET, FILM COATED ORAL at 06:03

## 2023-06-19 NOTE — PLAN OF CARE
Problem: PHYSICAL THERAPY ADULT  Goal: Performs mobility at highest level of function for planned discharge setting  See evaluation for individualized goals  Description: Treatment/Interventions: Functional transfer training, LE strengthening/ROM, Therapeutic exercise, Endurance training, Gait training, Spoke to nursing          See flowsheet documentation for full assessment, interventions and recommendations  Note: Prognosis: Good  Problem List: Decreased endurance, Impaired balance, Decreased mobility  Assessment: Patient seen for Physical Therapy evaluation  Patient admitted with Acute on chronic diastolic heart failure ([facility] )  Comorbidities affecting patient's physical performance include: IVY, CABG, CAD, cancer, cardiac disease, CHF, CKD, diabetes, hypertension and hyperlipidemia  Personal factors affecting patient at time of initial evaluation include: lives in 1 story house and stairs to enter home  Prior to admission, patient was independent with functional mobility without assistive device, independent with ADLS, independent with IADLS, living alone in 1 story home with 4 steps to enter and ambulating community distances  Please find objective findings from Physical Therapy assessment regarding body systems outlined above with impairments and limitations including impaired balance, decreased endurance, gait deviations, decreased activity tolerance, decreased functional mobility tolerance and fall risk  The Barthel Index was used as a functional outcome tool presenting with a score of Barthel Index Score: 90 today indicating minimal limitations of functional mobility and ADLS  Patient's clinical presentation is currently evolving as seen in patient's presentation of vital sign response, increased fall risk and decreased endurance  Pt would benefit from continued Physical Therapy treatment to address deficits as defined above and maximize level of functional mobility   As demonstrated by objective findings, the assigned level of complexity for this evaluation is moderate  The patient's AM-PAC Basic Mobility Inpatient Short Form Raw Score is 22  A Raw score of greater than 16 suggests the patient may benefit from discharge to home  Please also refer to the recommendation of the Physical Therapist for safe discharge planning  PT Discharge Recommendation: No rehabilitation needs (Pt plans to follow-up with cardiac rehab)    See flowsheet documentation for full assessment

## 2023-06-19 NOTE — PLAN OF CARE
Problem: MOBILITY - ADULT  Goal: Maintain or return to baseline ADL function  Description: INTERVENTIONS:  -  Assess patient's ability to carry out ADLs; assess patient's baseline for ADL function and identify physical deficits which impact ability to perform ADLs (bathing, care of mouth/teeth, toileting, grooming, dressing, etc )  - Assess/evaluate cause of self-care deficits   - Assess range of motion  - Assess patient's mobility; develop plan if impaired  - Assess patient's need for assistive devices and provide as appropriate  - Encourage maximum independence but intervene     6/19/2023 1248 by Silvano Payne RN  Outcome: Adequate for Discharge  6/19/2023 0720 by Silvano Payne RN  Outcome: Progressing  Goal: Maintains/Returns to pre admission functional level  Description: INTERVENTIONS:  - Perform BMAT or MOVE assessment daily    - Set and communicate daily mobility goal to care team and patient/family/caregiver     - Collaborate with rehabilitation services on mobility goals   6/19/2023 1248 by Silvano Payne RN  Outcome: Adequate for Discharge  6/19/2023 0720 by Silvano Payne RN  Outcome: Progressing     Problem: SAFETY ADULT  Goal: Maintain or return to baseline ADL function  Description: INTERVENTIONS:  -  Assess patient's ability to carry out ADLs; assess patient's baseline for ADL function and identify physical deficits which impact ability to perform ADLs (bathing, care of mouth/teeth, toileting, grooming, dressing, etc )  - Assess/evaluate cause of self-care deficits   - Assess range of motion  - Assess patient's mobility; develop plan if impaired  - Assess patient's need for assistive devices and provide as appropriate  - Encourage maximum independence but intervene     6/19/2023 1248 by Silvano Payne RN  Outcome: Adequate for Discharge  6/19/2023 0720 by Silvano Payne RN  Outcome: Progressing  Goal: Maintains/Returns to pre admission functional level  Description: INTERVENTIONS:  - Perform BMAT or MOVE assessment daily    - Set and communicate daily mobility goal to care team and patient/family/caregiver     - Collaborate with rehabilitation services on mobility goals   6/19/2023 1248 by Cristian Arana RN  Outcome: Adequate for Discharge  6/19/2023 0720 by Cristian Arana RN  Outcome: Progressing  Goal: Patient will remain free of falls  Description: INTERVENTIONS:  - Educate patient/family on patient safety including physical limitations  - Instruct patient to call for assistance with activity   - Consult OT/PT to assist with strengthening/mobility   - Keep Call bell within reach  - Keep bed low and locked with side rails adjusted as appropriate  - Keep care items and personal belongings within reach  - Initiate and maintain comfort rounds  - Make Fall Risk Sign visible to staff  - Offer Toileting every 2 Hours, in advance of need  - Initiate/Maintain bed/chair alarm  - Obtain necessary fall risk management equipment: bed/chair alarm  - Apply yellow socks and bracelet for high fall risk patients  - Consider moving patient to room near nurses station  6/19/2023 1248 by Cristian Arana RN  Outcome: Adequate for Discharge  6/19/2023 0720 by Cristian Arana RN  Outcome: Progressing     Problem: CARDIOVASCULAR - ADULT  Goal: Maintains optimal cardiac output and hemodynamic stability  Description: INTERVENTIONS:  - Monitor I/O, vital signs and rhythm  - Monitor for S/S and trends of decreased cardiac output  - Administer and titrate ordered vasoactive medications to optimize hemodynamic stability  - Assess quality of pulses, skin color and temperature  - Assess for signs of decreased coronary artery perfusion  - Instruct patient to report change in severity of symptoms  6/19/2023 1248 by Cristian Arana RN  Outcome: Adequate for Discharge  6/19/2023 0720 by Cristian Arana RN  Outcome: Progressing  Goal: Absence of cardiac dysrhythmias or at baseline rhythm  Description: INTERVENTIONS:  - Continuous cardiac monitoring, vital signs, obtain 12 lead EKG if ordered  - Administer antiarrhythmic and heart rate control medications as ordered  - Monitor electrolytes and administer replacement therapy as ordered  6/19/2023 1248 by Madhu Guzman RN  Outcome: Adequate for Discharge  6/19/2023 0720 by Madhu Guzman RN  Outcome: Progressing     Problem: RESPIRATORY - ADULT  Goal: Achieves optimal ventilation and oxygenation  Description: INTERVENTIONS:  - Assess for changes in respiratory status  - Assess for changes in mentation and behavior  - Position to facilitate oxygenation and minimize respiratory effort  - Oxygen administered by appropriate delivery if ordered  - Initiate smoking cessation education as indicated  - Encourage broncho-pulmonary hygiene including cough, deep breathe, Incentive Spirometry  - Assess the need for suctioning and aspirate as needed  - Assess and instruct to report SOB or any respiratory difficulty  - Respiratory Therapy support as indicated  6/19/2023 1248 by Madhu Guzman RN  Outcome: Adequate for Discharge  6/19/2023 0720 by Madhu Guzman RN  Outcome: Progressing     Problem: GENITOURINARY - ADULT  Goal: Maintains or returns to baseline urinary function  Description: INTERVENTIONS:  - Assess urinary function  - Encourage oral fluids to ensure adequate hydration if ordered  - Administer IV fluids as ordered to ensure adequate hydration    6/19/2023 1248 by Madhu Guzman RN  Outcome: Adequate for Discharge  6/19/2023 0720 by Madhu Guzman RN  Outcome: Progressing  Goal: Absence of urinary retention  Description: INTERVENTIONS:  - Assess patient's ability to void and empty bladder  - Monitor I/O  - Bladder scan as needed  - Discuss with physician/AP medications to alleviate retention as needed  - Discuss catheterization for long term situations as appropriate  6/19/2023 1248 by Madhu Guzman RN  Outcome: Adequate for Discharge  6/19/2023 0720 by Evalee Sacks, RN  Outcome: Progressing

## 2023-06-19 NOTE — PLAN OF CARE
Problem: MOBILITY - ADULT  Goal: Maintain or return to baseline ADL function  Description: INTERVENTIONS:  -  Assess patient's ability to carry out ADLs; assess patient's baseline for ADL function and identify physical deficits which impact ability to perform ADLs (bathing, care of mouth/teeth, toileting, grooming, dressing, etc )  - Assess/evaluate cause of self-care deficits   - Assess range of motion  - Assess patient's mobility; develop plan if impaired  - Assess patient's need for assistive devices and provide as appropriate  - Encourage maximum independence but intervene     Outcome: Progressing  Goal: Maintains/Returns to pre admission functional level  Description: INTERVENTIONS:  - Perform BMAT or MOVE assessment daily    - Set and communicate daily mobility goal to care team and patient/family/caregiver  - Collaborate with rehabilitation services on mobility goals   Outcome: Progressing     Problem: SAFETY ADULT  Goal: Maintain or return to baseline ADL function  Description: INTERVENTIONS:  -  Assess patient's ability to carry out ADLs; assess patient's baseline for ADL function and identify physical deficits which impact ability to perform ADLs (bathing, care of mouth/teeth, toileting, grooming, dressing, etc )  - Assess/evaluate cause of self-care deficits   - Assess range of motion  - Assess patient's mobility; develop plan if impaired  - Assess patient's need for assistive devices and provide as appropriate  - Encourage maximum independence but intervene     Outcome: Progressing  Goal: Maintains/Returns to pre admission functional level  Description: INTERVENTIONS:  - Perform BMAT or MOVE assessment daily    - Set and communicate daily mobility goal to care team and patient/family/caregiver     - Collaborate with rehabilitation services on mobility goals   Outcome: Progressing  Goal: Patient will remain free of falls  Description: INTERVENTIONS:  - Educate patient/family on patient safety including physical limitations  - Instruct patient to call for assistance with activity   - Consult OT/PT to assist with strengthening/mobility   - Keep Call bell within reach  - Keep bed low and locked with side rails adjusted as appropriate  - Keep care items and personal belongings within reach  - Initiate and maintain comfort rounds  - Make Fall Risk Sign visible to staff  - Offer Toileting every 2 Hours, in advance of need  - Initiate/Maintain bed/chair alarm  - Obtain necessary fall risk management equipment: bed/chair alarm  - Apply yellow socks and bracelet for high fall risk patients  - Consider moving patient to room near nurses station  Outcome: Progressing     Problem: CARDIOVASCULAR - ADULT  Goal: Maintains optimal cardiac output and hemodynamic stability  Description: INTERVENTIONS:  - Monitor I/O, vital signs and rhythm  - Monitor for S/S and trends of decreased cardiac output  - Administer and titrate ordered vasoactive medications to optimize hemodynamic stability  - Assess quality of pulses, skin color and temperature  - Assess for signs of decreased coronary artery perfusion  - Instruct patient to report change in severity of symptoms  Outcome: Progressing  Goal: Absence of cardiac dysrhythmias or at baseline rhythm  Description: INTERVENTIONS:  - Continuous cardiac monitoring, vital signs, obtain 12 lead EKG if ordered  - Administer antiarrhythmic and heart rate control medications as ordered  - Monitor electrolytes and administer replacement therapy as ordered  Outcome: Progressing     Problem: RESPIRATORY - ADULT  Goal: Achieves optimal ventilation and oxygenation  Description: INTERVENTIONS:  - Assess for changes in respiratory status  - Assess for changes in mentation and behavior  - Position to facilitate oxygenation and minimize respiratory effort  - Oxygen administered by appropriate delivery if ordered  - Initiate smoking cessation education as indicated  - Encourage broncho-pulmonary hygiene including cough, deep breathe, Incentive Spirometry  - Assess the need for suctioning and aspirate as needed  - Assess and instruct to report SOB or any respiratory difficulty  - Respiratory Therapy support as indicated  Outcome: Progressing     Problem: GENITOURINARY - ADULT  Goal: Maintains or returns to baseline urinary function  Description: INTERVENTIONS:  - Assess urinary function  - Encourage oral fluids to ensure adequate hydration if ordered  - Administer IV fluids as ordered to ensure adequate hydration    Outcome: Progressing  Goal: Absence of urinary retention  Description: INTERVENTIONS:  - Assess patient's ability to void and empty bladder  - Monitor I/O  - Bladder scan as needed  - Discuss with physician/AP medications to alleviate retention as needed  - Discuss catheterization for long term situations as appropriate  Outcome: Progressing

## 2023-06-19 NOTE — PROGRESS NOTES
[facility] 48 PROGRESS NOTE   Ld Arnold [de-identified] y o  male MRN: [id number]  Unit/Bed#: [address] [place] [address] Encounter: [id number]  Reason for Consult: IVY, CKD    ASSESSMENT and PLAN:  1  Acute kidney injury (POA)  · Admission creatinine of 2 23 on June 16, 2023  · Etiology is felt to be due to prerenal azotemia + Bactrim use  · Renal function improving with diuresis and holding Bactrim  · Creatinine down to 1 62 today  2  Chronic kidney disease, stage III:  · Baseline creatinine 1 3-1 5  · Follows with me in the office  3  Congestive heart failure:  · Clinically improved  · Was on HCTZ 12 5 mg daily prior to admission  · Discharge on torsemide 20 mg daily  · Continue Jardiance on discharge  4  Hypertension  · BP is controlled  · Current medications: Metoprolol tartrate 50 mg BID, Clonidine 0 1 mg twice a day, ISMN 60 mg daily, Furosemide 20 mg BID  · Losartan and amlodipine are on hold  · Change Furosemide to Torsemide 20 mg daily  5  Coronary artery disease: per cards  6  Diabetic foot ulcer: Was on Bactrim prior to admission  Now off   7  Mineral and bone disease: Parameters at goal     DISPOSITION:  · Improving renal function  · May be discharged from renal standpoint  · Change furosemide to torsemide 20 mg daily on discharge  · Continue to hold losartan  · Check BMP 1 week after discharge  The highlighted and/or bolded points in my assessment, plan, and disposition were discussed with the primary team and they agree with those points and the plan  SUBJECTIVE / 24H INTERVAL HISTORY:  Feels good  No CP or SOB  Edema resolved       OBJECTIVE:  Current Weight: Weight - Scale: 105 kg (232 lb 6 4 oz)  Vitals:    06/19/23 0558 06/19/23 0600 06/19/23 0738 06/19/23 0900   BP: 135/71  143/72    Pulse: 67   69   Resp:       Temp:   (!) 97 4 °F (36 3 °C)    SpO2: 93%   94%   Weight:  105 kg (232 lb 6 4 oz)     Height:           Intake/Output Summary (Last 24 hours) at 6/19/2023 1018  Last data filed at 6/19/2023 0603  Gross per 24 hour   Intake --   Output 1050 ml   Net -1050 ml     General: conscious, cooperative, no distress  Skin: dry  Eyes: pink conjunctivae  ENT: moist mucous membranes  Respiratory: equal chest expansion, clear breath sounds  Cardiovascular: distinct heart sounds, normal rate, regular rhythm, no rub  Abdomen: soft, non tender, non distended, normal bowel sounds  Extremities: no edema  Genitourinary: no gilbert catheter  Neuro: awake, alert     Psych: appropriate affect    Medications:    Current Facility-Administered Medications:   •  acetaminophen (TYLENOL) tablet 650 mg, 650 mg, Oral, Q6H PRN, Fox Austin DO, 650 mg at 06/19/23 0602  •  aspirin chewable tablet 81 mg, 81 mg, Oral, Daily, Fox Austin DO, 81 mg at 06/19/23 1864  •  atorvastatin (LIPITOR) tablet 40 mg, 40 mg, Oral, Daily With Dinner, Fox Austin DO, 40 mg at 06/18/23 1540  •  cloNIDine (CATAPRES) tablet 0 1 mg, 0 1 mg, Oral, Q12H [facility], Asaf Parker MD, 0 1 mg at 06/19/23 4422  •  clopidogrel (PLAVIX) tablet 75 mg, 75 mg, Oral, Daily, Fox Austin DO, 75 mg at 06/19/23 0550  •  furosemide (LASIX) tablet 20 mg, 20 mg, Oral, BID (diuretic), Asaf Parker MD, 20 mg at 06/19/23 0650  •  heparin (porcine) subcutaneous injection 5,000 Units, 5,000 Units, Subcutaneous, Q8H [facility], Fox Austin DO, 5,000 Units at 06/19/23 0557  •  insulin glargine (LANTUS) subcutaneous injection 10 Units 0 1 mL, 10 Units, Subcutaneous, HS, Fox Austin DO, 10 Units at 06/18/23 2133  •  insulin lispro (HumaLOG) 100 units/mL subcutaneous injection 1-5 Units, 1-5 Units, Subcutaneous, TID AC, 1 Units at 06/19/23 0811 **AND** Fingerstick Glucose (POCT), , , TID AC, Fox Austin DO  •  insulin lispro (HumaLOG) 100 units/mL subcutaneous injection 1-5 Units, 1-5 Units, Subcutaneous, HS, Fox Austin, , 1 Units at 06/18/23 2135  •  isosorbide mononitrate (IMDUR) 24 hr tablet 60 mg, 60 mg, Oral, Daily, Kaushik Collins, DO, 60 mg at 06/19/23 9338  •  metoprolol tartrate (LOPRESSOR) tablet 50 mg, 50 mg, Oral, Q12H [address], Ana LARISA Szymanski  •  ranolazine (RANEXA) 12 hr tablet 500 mg, 500 mg, Oral, BID, Kaushik Collins DO, 500 mg at 06/19/23 4626  •  tamsulosin (FLOMAX) capsule 0 4 mg, 0 4 mg, Oral, Daily With Dinner, Sincere Malcolm DO, 0 4 mg at 06/18/23 1540  •  trimethobenzamide (TIGAN) IM injection 200 mg, 200 mg, Intramuscular, Q6H PRN, Sincere Malcolm DO, 200 mg at 06/19/23 4546    Laboratory Results:  Results from last 7 days   Lab Units 06/19/23  0556 06/18/23  0527 06/17/23  0519 06/16/23  1803   WBC Thousand/uL  --   --  10 71* 10 46*   HEMOGLOBIN g/dL  --   --  11 9* 11 9*   HEMATOCRIT %  --   --  37 1 36 8   PLATELETS Thousands/uL  --   --  213 226   POTASSIUM mmol/L 3 8 3 7 4 0 4 5   CHLORIDE mmol/L 102 101 103 101   CO2 mmol/L 25 26 25 24   BUN mg/dL 40* 42* 45* 43*   CREATININE mg/dL 1 62* 1 97* 2 25* 2 23*   CALCIUM mg/dL 9 0 9 2 9 0 8 9   MAGNESIUM mg/dL 2 2 2 3 2 2 2 2   PHOSPHORUS mg/dL  --  2 6  --   --

## 2023-06-19 NOTE — ASSESSMENT & PLAN NOTE
· Follows with Dr Jonh Vera outpatient and saw about a week and a half ago  · Per Dr Winslow President last note history of triple-vessel bypass with last catheterization in November 2022 which showed only a patent LIMA to LAD  Proximal % occluded, proximal % occluded  Vein graft % occluded  Obtuse marginal 200% occluded  Vein graft to OM T2, 100% occluded  AV groove circumflex widely patent which feeds posterior lateral breaks  Patient has grade 1 to grade 2 collaterals to right circulation from the left circulation  EF is 50%    · Per last cardiology note patient is on aspirin and Plavix for 6 months and then will only be on aspirin  · Continue PTA aspirin, Plavix, statin, and Ranexa

## 2023-06-19 NOTE — ASSESSMENT & PLAN NOTE
Lab Results   Component Value Date    EGFR 39 06/19/2023    EGFR 31 06/18/2023    EGFR 26 06/17/2023    CREATININE 1 62 (H) 06/19/2023    CREATININE 1 97 (H) 06/18/2023    CREATININE 2 25 (H) 06/17/2023   · Creatinine 2 2 POA, baseline appears to be 1 5  · History of CKD stage II and follows with Dr Santa Gomes outpatient; per review of chart appears the etiology of CKD suspected to be hypertensive nephrosclerosis and diabetic kidney disease  · History of prostate cancer and has urethral device that assists with the opening of the sphincter  · CT abdomen pelvis without contrast: Patchy ggo in the a peribronchovascular distribution maybe related to edema or developing pna  No evidence of small bowel dilatation  No hydronephrosis The bladder is mildly distended     · Improved with diuresis and holding bactrim  · Cr improved to 1 62  · Hold losartan and HCTZ on discharge  · Repeat BMP in 1 week and continue to follow with nephrology outpatient

## 2023-06-19 NOTE — ASSESSMENT & PLAN NOTE
· 6/17 : EKG obtained showing 1st degree AVB with frequent PVC's  · TSH WNL  · Continue to monitor on telemetry; patient still with frequent PVC's  · Increase lopressor 25 mg twice daily to 50 mg twice daily  · Overnight pulse ox screening showed AHI 17  · Recommend sleep study outpatient

## 2023-06-19 NOTE — OCCUPATIONAL THERAPY NOTE
Occupational Therapy Evaluation       06/19/23 0930   Note Type   Note type Evaluation   Pain Assessment   Pain Assessment Tool 0-10   Pain Score No Pain   Home Living   Type of [address] [place] [address] One level;Stairs to enter with rails  (3-4 GABE +HR)   Bathroom Shower/Tub Tub/shower unit   Bathroom Toilet Standard   Bathroom Equipment Other (Comment)  (None)   Home Equipment Other (Comment)  (None)   Prior Function   Level of [place] Independent with ADLs; Independent with functional mobility; Independent with IADLS   Lives With Alone   Receives Help From Family  (2 daughters live nearby and assist as needed, check in on patient frequently)   IADLs Independent with driving; Independent with meal prep; Independent with medication management   Comments Patient reports PTA independent in all ADLs and mobility without AD   General   Additional Pertinent History Patient presented to hospital with c/o SOB   ADL   Eating Assistance 7  Independent   Grooming Assistance 7  Independent   UB Bathing Assistance 7  Independent   LB Bathing Assistance 7  Independent   UB Dressing Assistance 7  Independent   LB Dressing Assistance 7  [address]   Additional Comments Not assessed, patient received seated OOB in recliner chair   Transfers   Sit to [address] to [address] transfer 7  Independent   Additional items Standard toilet  (Ambulatory transfer, no AD)   Functional Mobility   Functional Mobility 7  Independent   Additional Comments Patient ambulated short household distance in room then long household distance in hallway without AD; on room air, no c/o SOB   Balance   Static Sitting Good   Dynamic Sitting Good   Static Standing Good   Dynamic Standing Good   Activity Tolerance   Activity Tolerance Patient tolerated treatment well   RUE Assessment   RUE Assessment WFL   LUE Assessment   LUE Assessment WFL   Cognition   Overall Cognitive Status WFL   Arousal/Participation Alert; Cooperative   Attention Within functional limits   Orientation Level Oriented X4   Memory Within functional limits   Following Commands Follows all commands and directions without difficulty   Assessment   Prognosis Good   Assessment Patient evaluated by Occupational Therapy  Patient admitted with Acute on chronic diastolic heart failure ([facility] [address] )  The patients occupational profile, medical and therapy history includes a brief history with review of medical and/or therapy records related to the presenting problem  Comorbidities affecting functional mobility and ADLS include: CHF, DM, MI, CAD, cancer  Prior to admission, patient was independent with functional mobility without assistive device, independent with ADLS and independent with IADLS  The evaluation identifies the following performance deficits: no deficits, that result in activity limitations and/or participation restrictions  This evaluation requires clinical decision making of low complexity, because the patients presents with no comorbidities that affect occupational performance and required no modification of tasks or assistance with consideration of a limited number of treatment options  The Barthel Index was used as a functional outcome tool presenting with a score of Barthel Index Score: 100, indicating no limitations of functional mobility and ADLS  The patient's raw score on the AM-PAC Daily Activity Inpatient Short Form is 24  A raw score of greater than or equal to 19 suggests the patient may benefit from discharge to home  Please refer to the recommendation of the Occupational Therapist for safe discharge planning  Patient is currently independent in all ADLs and mobility without AD, no skilled inpatient OT services indicated at this time     Goals   Patient Goals Go home   Plan   OT Frequency Eval only   Recommendation   OT Discharge Recommendation No rehabilitation needs   AM-[facility] Daily Activity Inpatient   Lower Body Dressing 4   Bathing 4   Toileting 4   Upper Body Dressing 4   Grooming 4   Eating 4   Daily Activity Raw Score 24   Daily Activity Standardized Score (Calc for Raw Score >=11) 57 54   AM-PAC Applied Cognition Inpatient   Following a Speech/Presentation 4   Understanding Ordinary Conversation 4   Taking Medications 4   Remembering Where Things Are Placed or Put Away 4   Remembering List of 4-5 Errands 4   Taking Care of Complicated Tasks 4   Applied Cognition Raw Score 24   Applied Cognition Standardized Score 62 21   Barthel Index   Feeding 10   Bathing 5   Grooming Score 5   Dressing Score 10   Bladder Score 10   Bowels Score 10   Toilet Use Score 10   Transfers (Bed/Chair) Score 15   Mobility (Level Surface) Score 15   Stairs Score 10   Barthel Index Score 648   Licensure   [place] License Number  Emily Forrester OTR/L [id number]

## 2023-06-19 NOTE — ASSESSMENT & PLAN NOTE
Lab Results   Component Value Date    HGBA1C 6 3 (H) 06/01/2023       Recent Labs     06/18/23 2013 06/19/23  0045 06/19/23  0718 06/19/23  1120   POCGLU 188* 149* 179* 235*       Blood Sugar Average: Last 72 hrs:  · (P) 156   · Decrease PTA glargine to 10 units nightly instead of 22 units nightly  · Continue home medications  · Diabetic diet

## 2023-06-19 NOTE — PHYSICAL THERAPY NOTE
"   PT EVALUATION       06/19/23 1020   PT Last Visit   PT Visit Date 06/19/23   Note Type   Note type Evaluation   Pain Assessment   Pain Assessment Tool 0-10   Pain Score No Pain   Multiple Pain Sites No   Restrictions/Precautions   Weight Bearing Precautions Per Order No   Other Precautions Fall Risk;Pain   Home Living   Type of [address] [place] [address] One level;Stairs to enter with rails  (3-4 GABE)   Bathroom Toilet Raised   Bathroom Equipment Grab bars in shower;Grab bars around toilet   [address] [facility]   Prior Function   Level of [address] with ADLs; Independent with functional mobility; Independent with IADLS   Lives With Alone   Receives Help From Family   IADLs Independent with driving; Independent with meal prep; Independent with medication management   Vocational Retired   General   Additional Pertinent History Pt is an [de-identified]year-old male who was admitted to the hospital on 6/16/23 due to progressive SOB  Pt with history of CHF, pt reports being fluid overloaded but feeling better  Family/Caregiver Present No   Cognition   Overall Cognitive Status WFL   Arousal/Participation Cooperative   Attention Within functional limits   Orientation Level Oriented X4   Following Commands Follows all commands and directions without difficulty   Subjective   Subjective \"I feel pretty good now I should be going home today\"   RLE Assessment   RLE Assessment WFL   LLE Assessment   LLE Assessment WFL   Bed Mobility   Additional Comments Pt received sitting in bedside chair   Transfers   Sit to Stand 7  Independent   Stand to Sit 7  Independent   Ambulation/Elevation   Gait pattern Step through pattern; Wide GALINA  (Mild antalgic gait pattern with no LOB, mild decrease in gait speed)   Gait Assistance 5  Supervision   Additional items Verbal cues   Assistive Device None   Distance 100 feet x 2   Stair Management Assistance 5  Supervision   Additional items Verbal cues   Stair Management Technique One rail " R;Reciprocal   Number of Stairs 4   Balance   Static Sitting Normal   Dynamic Sitting Good   Static Standing Good   Dynamic Standing Good   Ambulatory Fair +   Activity Tolerance   Activity Tolerance Patient tolerated treatment well;Patient limited by fatigue   Nurse Made Aware yes   Assessment   Prognosis Good   Problem List Decreased endurance; Impaired balance;Decreased mobility   Assessment Patient seen for Physical Therapy evaluation  Patient admitted with Acute on chronic diastolic heart failure ([facility] [address] )  Comorbidities affecting patient's physical performance include: IVY, CABG, CAD, cancer, cardiac disease, CHF, CKD, diabetes, hypertension and hyperlipidemia  Personal factors affecting patient at time of initial evaluation include: lives in 1 story house and stairs to enter home  Prior to admission, patient was independent with functional mobility without assistive device, independent with ADLS, independent with IADLS, living alone in 1 story home with 4 steps to enter and ambulating community distances  Please find objective findings from Physical Therapy assessment regarding body systems outlined above with impairments and limitations including impaired balance, decreased endurance, gait deviations, decreased activity tolerance, decreased functional mobility tolerance and fall risk  The Barthel Index was used as a functional outcome tool presenting with a score of Barthel Index Score: 90 today indicating minimal limitations of functional mobility and ADLS  Patient's clinical presentation is currently evolving as seen in patient's presentation of vital sign response, increased fall risk and decreased endurance  Pt would benefit from continued Physical Therapy treatment to address deficits as defined above and maximize level of functional mobility  As demonstrated by objective findings, the assigned level of complexity for this evaluation is moderate  The patient's AM-[facility] Basic Mobility Inpatient Short Form Raw Score is 22  A Raw score of greater than 16 suggests the patient may benefit from discharge to home  Please also refer to the recommendation of the Physical Therapist for safe discharge planning  Goals   Patient Goals to go home   STG Expiration Date 06/26/23   Short Term Goal #1 Pt will perform all bed mobility/transfers IND ; Pt will ambulate x 500 feet IND   LTG Expiration Date 07/03/23   Long Term Goal #1 Pt will ambulate x 1000 feet IND ; Pt will negotiate x 4 steps IND with reciprocal pattern ; AMPAC score will improve >22/24 to demonstrate improved functional independence   Plan   Treatment/Interventions Functional transfer training;LE strengthening/ROM; Therapeutic exercise; Endurance training;Gait training;Spoke to nursing   PT Frequency 3-5x/wk   Recommendation   PT Discharge Recommendation No rehabilitation needs  (Pt plans to follow-up with cardiac rehab)   AM-PAC Basic Mobility Inpatient   Turning in Flat Bed Without Bedrails 4   Lying on Back to Sitting on Edge of Flat Bed Without Bedrails 4   Moving Bed to Chair 4   Standing Up From Chair Using Arms 4   Walk in Room 3   Climb 3-5 Stairs With Railing 3   Basic Mobility Inpatient Raw Score 22   Basic Mobility Standardized Score 47 4   Highest Level Of Mobility   JH-HLM Goal 7: Walk 25 feet or more   JH-HLM Achieved 7: Walk 25 feet or more   Barthel Index   Feeding 10   Bathing 5   Grooming Score 5   Dressing Score 10   Bladder Score 10   Bowels Score 10   Toilet Use Score 10   Transfers (Bed/Chair) Score 15   Mobility (Level Surface) Score 10   Stairs Score 5   Barthel Index Score 90   End of Consult   Patient Position at End of Consult Bedside chair; All needs within reach   [facility] Insurance Number  Evelina Ada [id number]

## 2023-06-19 NOTE — ASSESSMENT & PLAN NOTE
Lab Results   Component Value Date    HGBA1C 6 3 (H) 06/01/2023       Recent Labs     06/18/23 2013 06/19/23  0045 06/19/23  0718 06/19/23  1120   POCGLU 188* 149* 179* 235*       Blood Sugar Average: Last 72 hrs:  · (P) 156   · Has a small diabetic ulcer on the right side of the foot which patient reports he saw his podiatrist for about a week and a half ago and was put on Bactrim for a reported infection started on 6/6 for 10 days  · Does not appear acutely infected  · Hold bactrim in setting of IVY on CKD   Patient did not receive full abx course   · Follow up with podiatry as outpatient

## 2023-06-19 NOTE — DISCHARGE SUMMARY
Arlene 89  Discharge- Yasmeen Must 1945, [de-identified] y o  male MRN: [id number]  Unit/Bed#: [address] [place] [address] Encounter: [id number]  Primary Care Provider: Jazmine Norwood DO   Date and time admitted to hospital: 6/16/2023  4:52 PM    * Acute on chronic diastolic heart failure (HCC)  Assessment & Plan  Wt Readings from Last 3 Encounters:   06/19/23 105 kg (232 lb 6 4 oz)   06/15/23 114 kg (252 lb)   06/08/23 113 kg (250 lb)     · Shortness of breath, abdominal distention, lower extremity edema  · BNP elevated, CT chest with evidence of pulmonary edema  · EKG sinus, rate 79, first-degree AV block with frequent PVCs  · Initial troponin negative  · Echo in July 2022 with LV EF 50-55%  Diastolic function mildly abnormal, consistent with grade 1 relaxation  Mild aortic stenosis    Aortic root mildly dilated  · Echo 6/17:  LVEF 95%, reduced systolic function with grade 2 diastolic dysfunction with mild to moderate AV stenosis   · S/p IV lasix transitioned to oral lasix 20 mg po BID  · Nephrology and cardiology following  · Start torsemide 20 mg daily on discharge  · Increase Lopressor 25 mg twice daily to 50 mg twice daily  · Continue Jardiance 25 mg daily  · Continue to hold losartan per nephrology    Acute kidney injury superimposed on chronic kidney disease [facility])  Assessment & Plan  Lab Results   Component Value Date    EGFR 39 06/19/2023    EGFR 31 06/18/2023    EGFR 26 06/17/2023    CREATININE 1 62 (H) 06/19/2023    CREATININE 1 97 (H) 06/18/2023    CREATININE 2 25 (H) 06/17/2023   · Creatinine 2 2 POA, baseline appears to be 1 5  · History of CKD stage II and follows with Dr Filiberto Gonzales outpatient; per review of chart appears the etiology of CKD suspected to be hypertensive nephrosclerosis and diabetic kidney disease  · History of prostate cancer and has urethral device that assists with the opening of the sphincter  · CT abdomen pelvis without contrast: Patchy ggo in the a peribronchovascular distribution maybe related to edema or developing pna  No evidence of small bowel dilatation  No hydronephrosis The bladder is mildly distended  · Improved with diuresis and holding bactrim  · Cr improved to 1 62  · Hold losartan and HCTZ on discharge  · Repeat BMP in 1 week and continue to follow with nephrology outpatient    Frequent PVCs  Assessment & Plan  · 6/17 : EKG obtained showing 1st degree AVB with frequent PVC's  · TSH WNL  · Continue to monitor on telemetry; patient still with frequent PVC's  · Increase lopressor 25 mg twice daily to 50 mg twice daily  · Overnight pulse ox screening showed AHI 17  · Recommend sleep study outpatient    Diabetic ulcer of right midfoot associated with type 2 diabetes mellitus, limited to breakdown of skin [facility])  Assessment & Plan  Lab Results   Component Value Date    HGBA1C 6 3 (H) 06/01/2023       Recent Labs     06/18/23 2013 06/19/23 0045 06/19/23  0718 06/19/23  1120   POCGLU 188* 149* 179* 235*       Blood Sugar Average: Last 72 hrs:  · (P) 156   · Has a small diabetic ulcer on the right side of the foot which patient reports he saw his podiatrist for about a week and a half ago and was put on Bactrim for a reported infection started on 6/6 for 10 days  · Does not appear acutely infected  · Hold bactrim in setting of IVY on CKD   Patient did not receive full abx course   · Follow up with podiatry as outpatient    Mild aortic stenosis  Assessment & Plan  · Echo in July 2022 with LVEF 31-16%, grade 1 diastolic dysfunction as well as mild aortic stenosis and mildly dilated aortic root  · Echo 6/17:  LVEF 61%, reduced systolic function with grade 2 diastolic dysfunction with mild to moderate AV stenosis     Type 2 diabetes mellitus with diabetic polyneuropathy, with long-term current use of insulin [facility])  Assessment & Plan  Lab Results   Component Value Date    HGBA1C 6 3 (H) 06/01/2023       Recent Labs     06/18/23 2013 06/19/23  0045 06/19/23  0718 06/19/23  1120 "  POCGLU 188* 149* 179* 235*       Blood Sugar Average: Last 72 hrs:  · (P) 156   · Decrease PTA glargine to 10 units nightly instead of 22 units nightly  · Continue home medications  · Diabetic diet    H/O prostate cancer  Assessment & Plan  · History of prostate cancer for which patient reports undergoing what sounds like a prostatectomy at [facility] in 2014 or 2015  · Has a \"urethral device\" that assists with opening and closing of the urethra as a result of the procedure and on exam patient has a device in the right side of the scrotum which he reports he presses to relieve himself  · bladder scan shows no retention    S/P AAA repair  Assessment & Plan  · History of AAA repair; denies any abdominal pain or chest pain  · Follows with vascular team outpatient    Hx of CABG  Assessment & Plan  · Follows with Dr Jonh Vera outpatient and saw about a week and a half ago  · Per Dr Winslow President last note history of triple-vessel bypass with last catheterization in November 2022 which showed only a patent LIMA to LAD  Proximal % occluded, proximal % occluded  Vein graft % occluded  Obtuse marginal 200% occluded  Vein graft to OM T2, 100% occluded  AV groove circumflex widely patent which feeds posterior lateral breaks  Patient has grade 1 to grade 2 collaterals to right circulation from the left circulation  EF is 50%    · Per last cardiology note patient is on aspirin and Plavix for 6 months and then will only be on aspirin  · Continue PTA aspirin, Plavix, statin, and Ranexa    Medical Problems     Resolved Problems  Date Reviewed: 6/19/2023   None       Discharging Physician / Practitioner: Jeannine Rodriguez PA-C  PCP: Delilah Laughter, DO  Admission Date:   Admission Orders (From admission, onward)     Ordered        06/16/23 2007  Inpatient Admission  Once                      Discharge Date: 06/19/23    Consultations During Hospital Stay:  · Nephrology  · Cardiology    Procedures Performed: " · none    Significant Findings / Test Results:   · CXR: mild CHF  · CT chest: Moderate septal thickening and moderate multifocal groundglass opacity due to interstitial and alveolar edema  · CT a/p: Patchy groundglass opacities in a peribronchovascular distribution may be related to developing pneumonia or edema  No evidence of small bowel dilatation  No hydronephrosis  The bladder is only mildly distended although somewhat more distended than the study of May  Incidental Findings:   · none    Test Results Pending at Discharge (will require follow up):   · none     Outpatient Tests Requested:  · Repeat BMP in 1 week    Complications:  none    Reason for Admission: acute on chronic CHF    Hospital Course:   Suhki Maza is a [de-identified] y o  male patient who originally presented to the hospital on 6/16/2023 due to worsening shortness of breath for 2 days  Workup in ED revealed BNP of 890 and imaging showed CHF and pulmonary edema  He was also noted to have an IVY on CKD  Nephrology and cardiology consulted for further management  He was started on IV lasix and transitioned to oral lasix 20 mg bid  On discharge, he should start torsemide 20 mg daily and repeat a BMP in 1 week  IVY improved with diuresis and holding bactrim, which patient was recently prescribed for a diabetic foot wound  He was also noted to have frequent PVCs on telemetry for which he should start Lopressor 50 mg bid  He should continue to hold losartan and HCTZ for now  Patient had an overnight pulse ox screening showing AHI of 17  He should follow up outpatient for sleep study  Patient will be discharged home  Please see above list of diagnoses and related plan for additional information  Condition at Discharge: good    Discharge Day Visit / Exam:   Subjective:  Patient reports feeling much better today  Denies chest pain, palpitations, or shortness of breath   Does report some mild nausea that was resolved with mediation and he was able to eat "his breakfast  He is eager to go home today as he is not sleeping well here  We discussed his medication changes and follow up appointments  Vitals: Blood Pressure: 143/72 (06/19/23 0738)  Pulse: 69 (06/19/23 0900)  Temperature: (!) 97 4 °F (36 3 °C) (06/19/23 0738)  Respirations: 18 (06/19/23 0333)  Height: 6' 2\" (188 cm) (06/17/23 0830)  Weight - Scale: 105 kg (232 lb 6 4 oz) (06/19/23 0600)  SpO2: 94 % (06/19/23 0900)  Exam:   Physical Exam  Vitals and nursing note reviewed  Constitutional:       General: He is not in acute distress  Appearance: He is well-developed  Cardiovascular:      Rate and Rhythm: Normal rate and regular rhythm  Heart sounds: Murmur heard  Pulmonary:      Effort: Pulmonary effort is normal  No respiratory distress  Breath sounds: Normal breath sounds  Abdominal:      Palpations: Abdomen is soft  Tenderness: There is no abdominal tenderness  Musculoskeletal:         General: No swelling  Skin:     General: Skin is warm and dry  Capillary Refill: Capillary refill takes less than 2 seconds  Neurological:      Mental Status: He is alert  Psychiatric:         Mood and Affect: Mood normal           Discussion with Family: Attempted to update  (daughter) via phone  Left voicemail  Discharge instructions/Information to patient and family:   See after visit summary for information provided to patient and family  Provisions for Follow-Up Care:  See after visit summary for information related to follow-up care and any pertinent home health orders  Disposition:   Home    Planned Readmission: none     Discharge Statement:  I spent >30 minutes discharging the patient  This time was spent on the day of discharge  I had direct contact with the patient on the day of discharge   Greater than 50% of the total time was spent examining patient, answering all patient questions, arranging and discussing plan of care with patient as well as " directly providing post-discharge instructions  Additional time then spent on discharge activities  Discharge Medications:  See after visit summary for reconciled discharge medications provided to patient and/or family        **Please Note: This note may have been constructed using a voice recognition system**

## 2023-06-19 NOTE — PROGRESS NOTES
Progress Note - Cardiology   [facility]     Arabella Castaneda [de-identified] y o  male MRN: [id number]  : 1945  Unit/Bed#: [place] [address] Encounter: [id number]    Assessment and Plan:   1  Acute on chronic combined systolic and diastolic heart failure  - Presented on 2023 for evaluation for shortness of breath, abdominal distention, and lower extremity edema  - 23 CT chest without contrast: Moderate septal thickening and moderate multifocal groundglass opacity due to interstitial and alveolar edema  - 23 CXR: Mild CHF   - 23 BNP: 890    - Transition from IV Lasix to oral Lasix on 2023   - Currently on Lasix 20 mg oral twice daily  - Will defer diuretic recommendations to nephrology given patient with IVY on CKD stage III  -Continue Imdur 60 mg daily  - Will increase Lopressor 25 mg twice daily to 50 mg twice daily   - Recommend restarting patient's home medication of Jardiance 25 mg daily on discharge  - 23 Nephrology recommendations noted: home medication of Norvasc 10 mg daily, hydrochlorothiazide 12 5 mg daily, losartan 100 mg daily currently on hold  Nephrology states can reintroduce amlodipine if BP rises above 150 SBP  - Strict I/Os  - Daily weight, standing scale  - Continue low-sodium diet with 1800 mL fluid restriction   - CHF education  2  Frequent PVCs  - Reviewed telemetry notes significant ventricular ectopy  - Patient currently asymptomatic  He denies chest pain, shortness of breath, or palpitations  - Will increase Lopressor 25 mg twice daily to 50 mg twice daily  - 23 TTE: LVEF 50%  There is mild global hypokinesis  Diastolic function is moderately abnormal, consistent with grade II (pseudonormal) relaxation  Left atrial filling pressure is elevated  Mild to moderate AVR, mild MVR, mild TVR    Right ventricular systolic pressure is moderately to severely elevated 50 to 60 mmHg    -Overnight pulse ox screen performed on 6/18-6/19 notes a AHI 17, strongly suggestive of suspected pathological breathing disorder   -Recommend formal sleep study outpatient  3  Hypertension      - BP currently stable  - Will increase Lopressor 25 mg twice daily to 50 mg twice daily  - 6/18/23 Nephrology recommendations noted: home medication of Norvasc 10 mg daily, hydrochlorothiazide 12 5 mg daily, losartan 100 mg daily currently on hold  Nephrology states can reintroduce amlodipine if BP rises above 150 SBP  - Continue clonidine 0 1 mg twice daily  4  Coronary artery disease     - Stable  Patiently currently denies chest pain  - s/p CABG x 3 (2002), s/p PCI (2021)  - 10/19/22 cardiac catheterization: Patient has severe three-vessel coronary artery disease with totally occluded 100% RCA, mid LAD and a medium size obtuse marginal  Patent stent seen in distal circumflex  Nonobstructive disease noted of left main and circumflex coronary artery  Vein graft to RCA is 100% occluded  And LIMA to LAD is widely patent  - Continue aspirin 81 mg daily, Plavix 75 mg daily, Lipitor 40 mg daily, and Ranexa 500 mg twice daily  5  Aortic stenosis  - Mild to moderate stenosis  - 6/17/23 TTE:   Aortic Valve The aortic valve is trileaflet  The leaflets are moderately thickened  The leaflets are moderately calcified  The leaflets exhibit normal mobility  There is no evidence of regurgitation  There is mild to moderate stenosis  The aortic valve peak velocity is 3 2 m/s  The aortic valve peak gradient is 41 mmHg  The aortic valve mean gradient is 25 mmHg  - 7/26/22 TTE:   Aortic Valve The aortic valve is trileaflet  The leaflets are moderately thickened  The leaflets are moderately calcified  The leaflets exhibit normal mobility  There is no evidence of regurgitation  There is mild stenosis  Mean/peak gradient is 14/27 mm Hg, JEFF is 1 8 cm2, DVI is 0 52     6  IVY on CKD  - Patient with known past medical history of CKD stage III    - "Baseline creatinine 1 3 to 1 6 mg mg/dL  - Creatinine on admission 6/16/23,  2 2    - Nephrology following, recommendations noted  7  Abdominal aortic aneurysm s/p repair      - s/p EVAR in 2012/2013    - 6/17/23 CT abdomen/pelvis without contrast: Stent graft is identified  The native aneurysm is similar in size measuring approximately 5 2 cm as compared to March 2023      8  Diabetes mellitus, type II     - 6/1/2023 Hgb A1c: 6 3  - Care per primary team   Subjective / Objective:          No acute events  Patient reports feeling better since admission  He does state he has some mild nausea at times  He currently denies chest pain, shortness of breath, palpitations, lightheadedness, dizziness, or vomiting  Vitals: Blood pressure 143/72, pulse 67, temperature (!) 97 4 °F (36 3 °C), resp  rate 18, height 6' 2\" (1 88 m), weight 105 kg (232 lb 6 4 oz), SpO2 93 %  Vitals:    06/18/23 1100 06/19/23 0600   Weight: 108 kg (238 lb) 105 kg (232 lb 6 4 oz)     Weight (last 2 days)     Date/Time Weight    06/19/23 0600 105 (232 4)    06/18/23 1100 108 (238)    06/18/23 0539 170 (374 78)    06/18/23 0500 170 (374 78)    06/17/23 0830 112 (246 92)    06/17/23 0600 112 (247 8)          Body mass index is 29 84 kg/m²  BP Readings from Last 3 Encounters:   06/19/23 143/72   06/15/23 122/62   06/08/23 120/60     Orthostatic Blood Pressures    Flowsheet Row Most Recent Value   Blood Pressure 143/72 filed at 06/19/2023 0738   Patient Position - Orthostatic VS Lying filed at 06/16/2023 2030        I/O       06/17 0701  06/18 0700 06/18 0701  06/19 0700 06/19 0701  06/20 0700    P  O  1100 500     I V  (mL/kg) 10 (0 1)      Total Intake(mL/kg) 1110 (6 5) 500 (4 8)     Urine (mL/kg/hr) 3430 (0 8) 1550 (0 6)     Total Output 3430 1550     Net -2320 -1050                Invasive Devices     Peripheral Intravenous Line  Duration           Peripheral IV 06/18/23 Proximal;Right;Ventral (anterior) Forearm <1 day          " Intake/Output Summary (Last 24 hours) at 6/19/2023 0740  Last data filed at 6/19/2023 0603  Gross per 24 hour   Intake 500 ml   Output 1550 ml   Net -1050 ml       Physical Exam:   Physical Exam  Vitals reviewed  Constitutional:       General: He is not in acute distress  Cardiovascular:      Rate and Rhythm: Normal rate and regular rhythm  Pulses: Normal pulses  Heart sounds: Murmur heard  Pulmonary:      Effort: Pulmonary effort is normal  No respiratory distress  Abdominal:      General: Abdomen is flat  There is no distension  Palpations: Abdomen is soft  Tenderness: There is no abdominal tenderness  Musculoskeletal:      Right lower leg: No edema  Left lower leg: No edema  Skin:     General: Skin is warm and dry  Neurological:      Mental Status: He is alert and oriented to person, place, and time         Medications/ Allergies:     Current Facility-Administered Medications   Medication Dose Route Frequency Provider Last Rate   • acetaminophen  650 mg Oral Q6H PRN Kory Free, DO     • aspirin  81 mg Oral Daily Kory Free, DO     • atorvastatin  40 mg Oral Daily With Dinner Kory Free, DO     • cloNIDine  0 1 mg Oral Q12H [address] Landry Buerger, MD     • clopidogrel  75 mg Oral Daily Kory Free, DO     • furosemide  20 mg Oral BID (diuretic) Landry Buerger, MD     • heparin (porcine)  5,000 Units Subcutaneous Q8H [address] Kory Free, DO     • insulin glargine  10 Units Subcutaneous HS Kory Free, DO     • insulin lispro  1-5 Units Subcutaneous TID AC Kory Free, DO     • insulin lispro  1-5 Units Subcutaneous HS Kory Free, DO     • isosorbide mononitrate  60 mg Oral Daily Kory Free, DO     • metoprolol tartrate  25 mg Oral Q12H [address] APOLLO Cruz     • ranolazine  500 mg Oral BID Kory Free, DO     • tamsulosin  0 4 mg Oral Daily With Dinner Lyndsayi Gold, DO     • trimethobenzamide  200 mg Intramuscular Q6H PRN Sincere DO Gold       acetaminophen, 650 mg, Q6H PRN  trimethobenzamide, 200 mg, Q6H PRN      Allergies   Allergen Reactions   • Lisinopril Rash and Lip Swelling       VTE Pharmacologic Prophylaxis:   Heparin    Labs:   Troponins:  Results from last 7 days   Lab Units 06/16/23  2251 06/16/23 2001   HSTNI D2 ng/L  --  3   HSTNI D4 ng/L 1  --      CBC with diff:  Results from last 7 days   Lab Units 06/17/23  0519 06/16/23  1803   WBC Thousand/uL 10 71* 10 46*   HEMOGLOBIN g/dL 11 9* 11 9*   HEMATOCRIT % 37 1 36 8   MCV fL 85 85   PLATELETS Thousands/uL 213 226   RBC Million/uL 4 36 4 31   MCH pg 27 3 27 6   MCHC g/dL 32 1 32 3   RDW % 13 8 14 0   MPV fL 11 3 11 3   NRBC AUTO /100 WBCs 0 0       CMP:  Results from last 7 days   Lab Units 06/19/23  0556 06/18/23  0527 06/17/23 0519 06/16/23  1803   SODIUM mmol/L 137 137 136 133*   POTASSIUM mmol/L 3 8 3 7 4 0 4 5   CHLORIDE mmol/L 102 101 103 101   CO2 mmol/L 25 26 25 24   ANION GAP mmol/L 10 10 8 8   BUN mg/dL 40* 42* 45* 43*   CREATININE mg/dL 1 62* 1 97* 2 25* 2 23*   CALCIUM mg/dL 9 0 9 2 9 0 8 9   AST U/L  --   --  14 19   ALT U/L  --   --  13 18   ALK PHOS U/L  --   --  34 36   TOTAL PROTEIN g/dL  --   --  7 1 7 6   ALBUMIN g/dL  --   --  3 7 3 8   TOTAL BILIRUBIN mg/dL  --   --  0 69 0 69   EGFR ml/min/1 73sq m 39 31 26 26       Magnesium:  Results from last 7 days   Lab Units 06/19/23  0556 06/18/23  0527 06/17/23 0519 06/16/23  1803   MAGNESIUM mg/dL 2 2 2 3 2 2 2 2     Coags:  Results from last 7 days   Lab Units 06/17/23  0519 06/16/23  1803   PTT seconds 34 33   INR  1 26* 1 20*     TSH:  Results from last 7 days   Lab Units 06/17/23  0519   TSH 3RD GENERATON uIU/mL 1 536       Imaging & Testing   I have personally reviewed pertinent reports  CT abdomen pelvis wo contrast    Result Date: 6/17/2023    Impression: Patchy groundglass opacities in a peribronchovascular distribution may be related to developing pneumonia or edema   No evidence of small bowel dilatation  No hydronephrosis  The bladder is only mildly distended although somewhat more distended than the study of May  Similar preliminary results were given by virtual radiologic at 4:44 a m      XR chest 1 view portable    Result Date: 6/17/2023  Narrative: CHEST INDICATION:   sob  COMPARISON: 05/11/2023 EXAM PERFORMED/VIEWS:  XR CHEST PORTABLE Images: 2 FINDINGS: Cardiomediastinal silhouette appears enlarged  Median sternotomy has been performed  The patient is in mild CHF  The right hemidiaphragm is elevated  The lungs are clear  No pneumothorax or pleural effusion  Osseous structures appear within normal limits for patient age  Impression: Mild CHF  CT chest without contrast    Result Date: 6/16/2023    Impression: Moderate septal thickening and moderate multifocal groundglass opacity due to interstitial and alveolar edema  EKG / Monitor: Personally reviewed  Telemetry reviewed: Currently sinus rhythm, rate 73 bpm   Significant ventricular ectopy noted  Cardiac testing:     Echo complete w/ contrast if indicated    Result Date: 6/17/2023  Narrative: •  Left Ventricle: Left ventricular cavity size is normal  Wall thickness is moderately increased  There is mild to moderate concentric hypertrophy  The left ventricular ejection fraction is 50%  Systolic function is mildly reduced  There is mild global hypokinesis  Diastolic function is moderately abnormal, consistent with grade II (pseudonormal) relaxation  Left atrial filling pressure is elevated  •  IVS: There is abnormal septal motion consistent with post-operative status  There is sigmoid appearance of the septum  •  Right Ventricle: Systolic function appears normal visually  •  Left Atrium: The atrium is moderately dilated  •  Aortic Valve: The aortic valve is trileaflet  The leaflets are moderately thickened  The leaflets are moderately calcified  The leaflets exhibit normal mobility  There is mild to moderate stenosis   The aortic valve peak velocity is 3 2 m/s  The aortic valve peak gradient is 41 mmHg  The aortic valve mean gradient is 25 mmHg  •  Mitral Valve: There is mild regurgitation  •  Tricuspid Valve: There is mild regurgitation  The right ventricular systolic pressure is moderately to severely elevated at 50-60mmHg  •  Aorta: The aortic root is mildly dilated  Results for orders placed during the hospital encounter of 02/16/22    NM Myocardial Perfusion Spect (Pharmacological Induced Stress and/or Rest)    Interpretation Summary  •  Stress Combined Conclusion: Left ventricular perfusion is abnormal  There is mild photon reduction in the anterior wall at stress  Findings are consistent with ischemia  Additional area of infarct involving the mid and apical portion of the inferior wall  •  Stress Function: Left ventricular function post-stress is abnormal  Global function is mildly reduced  There were no regional wall motion abnormalities during stress  Post-stress ejection fraction is 40 %  •  Stress ECG: No ST deviation is noted  There were no arrhythmias during recovery    The ECG was negative for ischemia  •  Perfusion: There is a left ventricular perfusion defect that is small in size with severe reduction in uptake present in the mid to apical inferior location(s) that is fixed  There is a left ventricular perfusion defect that is small to medium in size with mild reduction in uptake present in the mid to apical anterior location(s) that is reversible              Radha Shaffer PA-C

## 2023-06-19 NOTE — PLAN OF CARE
Problem: SAFETY ADULT  Goal: Maintain or return to baseline ADL function  Description: INTERVENTIONS:  -  Assess patient's ability to carry out ADLs; assess patient's baseline for ADL function and identify physical deficits which impact ability to perform ADLs (bathing, care of mouth/teeth, toileting, grooming, dressing, etc )  - Assess/evaluate cause of self-care deficits   - Assess range of motion  - Assess patient's mobility; develop plan if impaired  - Assess patient's need for assistive devices and provide as appropriate  - Encourage maximum independence but intervene     Outcome: Progressing     Problem: MOBILITY - ADULT  Goal: Maintain or return to baseline ADL function  Description: INTERVENTIONS:  -  Assess patient's ability to carry out ADLs; assess patient's baseline for ADL function and identify physical deficits which impact ability to perform ADLs (bathing, care of mouth/teeth, toileting, grooming, dressing, etc )  - Assess/evaluate cause of self-care deficits   - Assess range of motion  - Assess patient's mobility; develop plan if impaired  - Assess patient's need for assistive devices and provide as appropriate  - Encourage maximum independence but intervene     Outcome: Progressing  Goal: Maintains/Returns to pre admission functional level  Description: INTERVENTIONS:  - Perform BMAT or MOVE assessment daily    - Set and communicate daily mobility goal to care team and patient/family/caregiver     - Collaborate with rehabilitation services on mobility goals   Outcome: Progressing     Problem: CARDIOVASCULAR - ADULT  Goal: Maintains optimal cardiac output and hemodynamic stability  Description: INTERVENTIONS:  - Monitor I/O, vital signs and rhythm  - Monitor for S/S and trends of decreased cardiac output  - Administer and titrate ordered vasoactive medications to optimize hemodynamic stability  - Assess quality of pulses, skin color and temperature  - Assess for signs of decreased coronary artery perfusion  - Instruct patient to report change in severity of symptoms  Outcome: Progressing  Goal: Absence of cardiac dysrhythmias or at baseline rhythm  Description: INTERVENTIONS:  - Continuous cardiac monitoring, vital signs, obtain 12 lead EKG if ordered  - Administer antiarrhythmic and heart rate control medications as ordered  - Monitor electrolytes and administer replacement therapy as ordered  Outcome: Progressing     Problem: RESPIRATORY - ADULT  Goal: Achieves optimal ventilation and oxygenation  Description: INTERVENTIONS:  - Assess for changes in respiratory status  - Assess for changes in mentation and behavior  - Position to facilitate oxygenation and minimize respiratory effort  - Oxygen administered by appropriate delivery if ordered  - Initiate smoking cessation education as indicated  - Encourage broncho-pulmonary hygiene including cough, deep breathe, Incentive Spirometry  - Assess the need for suctioning and aspirate as needed  - Assess and instruct to report SOB or any respiratory difficulty  - Respiratory Therapy support as indicated  Outcome: Progressing     Problem: GENITOURINARY - ADULT  Goal: Maintains or returns to baseline urinary function  Description: INTERVENTIONS:  - Assess urinary function  - Encourage oral fluids to ensure adequate hydration if ordered  - Administer IV fluids as ordered to ensure adequate hydration    Outcome: Progressing  Goal: Absence of urinary retention  Description: INTERVENTIONS:  - Assess patient's ability to void and empty bladder  - Monitor I/O  - Bladder scan as needed  - Discuss with physician/AP medications to alleviate retention as needed  - Discuss catheterization for long term situations as appropriate  Outcome: Progressing

## 2023-06-19 NOTE — ASSESSMENT & PLAN NOTE
Wt Readings from Last 3 Encounters:   06/19/23 105 kg (232 lb 6 4 oz)   06/15/23 114 kg (252 lb)   06/08/23 113 kg (250 lb)     · Shortness of breath, abdominal distention, lower extremity edema  · BNP elevated, CT chest with evidence of pulmonary edema  · EKG sinus, rate 79, first-degree AV block with frequent PVCs  · Initial troponin negative  · Echo in July 2022 with LV EF 50-55%  Diastolic function mildly abnormal, consistent with grade 1 relaxation  Mild aortic stenosis    Aortic root mildly dilated  · Echo 6/17:  LVEF 06%, reduced systolic function with grade 2 diastolic dysfunction with mild to moderate AV stenosis   · S/p IV lasix transitioned to oral lasix 20 mg po BID  · Nephrology and cardiology following  · Start torsemide 20 mg daily on discharge  · Increase Lopressor 25 mg twice daily to 50 mg twice daily  · Continue Jardiance 25 mg daily  · Continue to hold losartan per nephrology

## 2023-06-19 NOTE — ASSESSMENT & PLAN NOTE
"· History of prostate cancer for which patient reports undergoing what sounds like a prostatectomy at [facility] in 2014 or 2015  · Has a \"urethral device\" that assists with opening and closing of the urethra as a result of the procedure and on exam patient has a device in the right side of the scrotum which he reports he presses to relieve himself  · bladder scan shows no retention  "

## 2023-06-19 NOTE — ASSESSMENT & PLAN NOTE
· Echo in July 2022 with LVEF 88-12%, grade 1 diastolic dysfunction as well as mild aortic stenosis and mildly dilated aortic root  · Echo 6/17:  LVEF 40%, reduced systolic function with grade 2 diastolic dysfunction with mild to moderate AV stenosis

## 2023-06-19 NOTE — DISCHARGE INSTR - AVS FIRST PAGE
Follow up with your primary care doctor in 1-2 weeks for post hospitalization follow up  Continue to follow with cardiology and nephrology outpatient  For your heart failure:  Start taking torsemide 20 mg once daily  Start taking Lopressor 50 mg twice daily  Continue low-sodium diet with 1800 mL fluid restriction  Monitor daily weights    For your kidney disease:  Repeat blood work (BMP) in about 7-10 days  Stop taking losartan and hydrochlorothiazide    You had an overnight sleep screening done which showed you have moderate sleep apnea  It is recommended you follow up for a formal sleep study outpatient  Information provided

## 2023-06-19 NOTE — TELEPHONE ENCOUNTER
Pt's daughter called to schedule hosp f/u in [facility]  [facility]  Wants to know if he needs to be seen sooner  He just did labs today  He has a regular f/u scheduled to be seen in March of 2024

## 2023-06-20 LAB
ATRIAL RATE: 92 BPM
P AXIS: 19 DEGREES
PR INTERVAL: 214 MS
QRS AXIS: -56 DEGREES
QRSD INTERVAL: 120 MS
QT INTERVAL: 402 MS
QTC INTERVAL: 497 MS
T WAVE AXIS: 76 DEGREES
VENTRICULAR RATE: 92 BPM

## 2023-06-20 PROCEDURE — 93010 ELECTROCARDIOGRAM REPORT: CPT | Performed by: INTERNAL MEDICINE

## 2023-06-20 RX ORDER — ISOSORBIDE MONONITRATE 60 MG/1
TABLET, EXTENDED RELEASE ORAL
Qty: 90 TABLET | Refills: 3 | Status: SHIPPED | OUTPATIENT
Start: 2023-06-20

## 2023-06-20 NOTE — UTILIZATION REVIEW
NOTIFICATION OF ADMISSION DISCHARGE   This is a Notification of Discharge from [address]  Please be advised that this patient has been discharge from our facility  Below you will find the admission and discharge date and time including the patient’s disposition  UTILIZATION REVIEW CONTACT:  Layo Farmer  Utilization   Network Utilization Review Department  Phone: [phone] x carefully listen to the prompts  All voicemails are confidential   Email: [email or web address]     ADMISSION INFORMATION  PRESENTATION DATE: 6/16/2023  4:52 PM  OBERVATION ADMISSION DATE:   INPATIENT ADMISSION DATE: 6/16/23  8:07 PM   DISCHARGE DATE: 6/19/2023  1:45 PM   DISPOSITION:Home/Self Care    IMPORTANT INFORMATION:  Send all requests for admission clinical reviews, approved or denied determinations and any other requests to dedicated fax number below belonging to the campus where the patient is receiving treatment   List of dedicated fax numbers:  [address] DENIALS (Administrative/Medical Necessity) [phone]   [address] (Maternity/NICU/Pediatrics) [phone]   [facility] [phone]   [place] [address] [phone]   [address] [phone]   [address] [facility] [phone] 90 [facility] [phone]   [address] [place], [place] [address] [phone]   [facility]  [phone]   [address] [facility] [phone]   [address] [facility] [address] [facility] [phone]

## 2023-06-22 ENCOUNTER — TELEPHONE (OUTPATIENT)
Dept: FAMILY MEDICINE CLINIC | Facility: CLINIC | Age: 78
End: 2023-06-22

## 2023-06-22 DIAGNOSIS — R26.9 NEUROLOGIC GAIT DYSFUNCTION: Primary | ICD-10-CM

## 2023-06-22 LAB
BACTERIA BLD CULT: NORMAL
BACTERIA BLD CULT: NORMAL

## 2023-06-22 NOTE — TELEPHONE ENCOUNTER
"Sandra Hernanedz is asking for an order for \"PT for his balance and strength\" and he would like a call once the order is in his chart  There is an order in his chart from 6/16/23 but it says canceled    (from when he was an inpatient)  [facility]  "

## 2023-06-22 NOTE — TELEPHONE ENCOUNTER
Patient called office to move up appointment to sooner date  Patient scheduled tentatively 8/24 in [place] office with Jacob Banks   Placed on high priority wait list for EO and WO

## 2023-06-22 NOTE — TELEPHONE ENCOUNTER
Patient informed and expressed understanding  No further action    Princess Westfall, [facility]

## 2023-06-24 ENCOUNTER — RA CDI HCC (OUTPATIENT)
Dept: OTHER | Facility: HOSPITAL | Age: 78
End: 2023-06-24

## 2023-06-24 NOTE — PROGRESS NOTES
[facility]  coding opportunities          Chart Reviewed number of suggestions sent to Provider: 3   E11 51  N18 [id number]  I13 0    Patients Insurance     Medicare Insurance: [employer]

## 2023-06-26 ENCOUNTER — APPOINTMENT (OUTPATIENT)
Dept: LAB | Facility: CLINIC | Age: 78
End: 2023-06-26
Payer: COMMERCIAL

## 2023-06-26 DIAGNOSIS — N18.9 ACUTE KIDNEY INJURY SUPERIMPOSED ON CHRONIC KIDNEY DISEASE (HCC): ICD-10-CM

## 2023-06-26 DIAGNOSIS — N17.9 ACUTE KIDNEY INJURY SUPERIMPOSED ON CHRONIC KIDNEY DISEASE (HCC): ICD-10-CM

## 2023-06-26 LAB
ANION GAP SERPL CALCULATED.3IONS-SCNC: 5 MMOL/L
BUN SERPL-MCNC: 48 MG/DL (ref 5–25)
CALCIUM SERPL-MCNC: 9.6 MG/DL (ref 8.3–10.1)
CHLORIDE SERPL-SCNC: 103 MMOL/L (ref 96–108)
CO2 SERPL-SCNC: 28 MMOL/L (ref 21–32)
CREAT SERPL-MCNC: 2.09 MG/DL (ref 0.6–1.3)
GFR SERPL CREATININE-BSD FRML MDRD: 29 ML/MIN/1.73SQ M
GLUCOSE P FAST SERPL-MCNC: 74 MG/DL (ref 65–99)
POTASSIUM SERPL-SCNC: 4.3 MMOL/L (ref 3.5–5.3)
SODIUM SERPL-SCNC: 136 MMOL/L (ref 135–147)

## 2023-06-26 PROCEDURE — 36415 COLL VENOUS BLD VENIPUNCTURE: CPT

## 2023-06-26 PROCEDURE — 80048 BASIC METABOLIC PNL TOTAL CA: CPT

## 2023-06-28 ENCOUNTER — TELEPHONE (OUTPATIENT)
Dept: INPATIENT UNIT | Facility: HOSPITAL | Age: 78
End: 2023-06-28

## 2023-06-28 ENCOUNTER — TELEPHONE (OUTPATIENT)
Dept: OTHER | Facility: HOSPITAL | Age: 78
End: 2023-06-28

## 2023-06-28 DIAGNOSIS — I12.9 BENIGN HYPERTENSION WITH CKD (CHRONIC KIDNEY DISEASE) STAGE III (HCC): Primary | ICD-10-CM

## 2023-06-28 DIAGNOSIS — N18.30 BENIGN HYPERTENSION WITH CKD (CHRONIC KIDNEY DISEASE) STAGE III (HCC): Primary | ICD-10-CM

## 2023-06-28 NOTE — TELEPHONE ENCOUNTER
I called the patient and we spoke over the phone  Recent laboratory data were reviewed  Lab Results   Component Value Date    SODIUM 136 06/26/2023    K 4 3 06/26/2023     06/26/2023    CO2 28 06/26/2023    BUN 48 (H) 06/26/2023    CREATININE 2 09 (H) 06/26/2023    GLUC 147 (H) 06/19/2023    CALCIUM 9 6 06/26/2023     Renal function is slightly worse compared to the hospital readings  SCr back up to 2 09  On speaking with patient, he is doing well with Torsemide 20 mg daily  No SOB, no edema  Home SBP is in the 150s before AM meds  Plan:  • Current creatinine of 2 09 is acceptable given this is likely due to change in diuretics from HCTZ to Torsemide  • Will need to tolerate a higher creatinine to maintain euvolemia  • No change to meds  • Repeat BMP in 1 month  • Check aldosterone, renin, metanephrines  • Advised to bring BP cuff to next office visit for calibration

## 2023-06-29 ENCOUNTER — OFFICE VISIT (OUTPATIENT)
Dept: FAMILY MEDICINE CLINIC | Facility: CLINIC | Age: 78
End: 2023-06-29
Payer: COMMERCIAL

## 2023-06-29 VITALS
RESPIRATION RATE: 18 BRPM | HEIGHT: 74 IN | HEART RATE: 56 BPM | SYSTOLIC BLOOD PRESSURE: 106 MMHG | DIASTOLIC BLOOD PRESSURE: 60 MMHG | WEIGHT: 237 LBS | BODY MASS INDEX: 30.42 KG/M2 | TEMPERATURE: 98 F | OXYGEN SATURATION: 97 %

## 2023-06-29 DIAGNOSIS — N18.30 BENIGN HYPERTENSION WITH CKD (CHRONIC KIDNEY DISEASE) STAGE III (HCC): ICD-10-CM

## 2023-06-29 DIAGNOSIS — E11.42 TYPE 2 DIABETES MELLITUS WITH DIABETIC POLYNEUROPATHY, WITH LONG-TERM CURRENT USE OF INSULIN (HCC): ICD-10-CM

## 2023-06-29 DIAGNOSIS — I12.9 BENIGN HYPERTENSION WITH CKD (CHRONIC KIDNEY DISEASE) STAGE III (HCC): ICD-10-CM

## 2023-06-29 DIAGNOSIS — Z79.4 TYPE 2 DIABETES MELLITUS WITH DIABETIC POLYNEUROPATHY, WITH LONG-TERM CURRENT USE OF INSULIN (HCC): ICD-10-CM

## 2023-06-29 DIAGNOSIS — I25.10 CORONARY ARTERY DISEASE INVOLVING NATIVE CORONARY ARTERY OF NATIVE HEART WITHOUT ANGINA PECTORIS: ICD-10-CM

## 2023-06-29 DIAGNOSIS — E78.2 MIXED HYPERLIPIDEMIA: ICD-10-CM

## 2023-06-29 DIAGNOSIS — N18.32 STAGE 3B CHRONIC KIDNEY DISEASE (HCC): ICD-10-CM

## 2023-06-29 DIAGNOSIS — I50.22 CHRONIC SYSTOLIC HEART FAILURE (HCC): Primary | ICD-10-CM

## 2023-06-29 PROCEDURE — 99495 TRANSJ CARE MGMT MOD F2F 14D: CPT | Performed by: FAMILY MEDICINE

## 2023-06-29 NOTE — PROGRESS NOTES
Assessment/Plan:    1  Chronic systolic heart failure ([facility] [address] )    2  Coronary artery disease involving native coronary artery of native heart without angina pectoris    3  Benign hypertension with CKD (chronic kidney disease) stage III ([address] )    4  Type 2 diabetes mellitus with diabetic polyneuropathy, with long-term current use of insulin (HCC)    5  Stage 3b chronic kidney disease ([facility] )    6  Mixed hyperlipidemia            There are no Patient Instructions on file for this visit  No follow-ups on file  Subjective:      Patient ID: Augustin Torres is a [de-identified] y o  male  Chief Complaint   Patient presents with   • Transition of Care Management     CHF  [id number]       Pt is scheduled for a TCM  Nurses TCM note appreciated    Pt states he has been doing hios BP every day every am    His BP aty home is 155/70 before meds in the AM  Current weight - 230 - pt states this is his baseline weight    Pt emelina SOB, breathing has been good    Pt had a BMP and states renal called him and he has repeat labs ordered by him      The following portions of the patient's history were reviewed and updated as appropriate: allergies, current medications, past family history, past medical history, past social history, past surgical history and problem list     Review of Systems   Constitutional: Negative for activity change, appetite change, chills, diaphoresis, fatigue, fever and unexpected weight change  HENT: Negative for congestion, dental problem, ear pain, mouth sores, sinus pressure, sinus pain, sore throat and trouble swallowing  Eyes: Negative for photophobia, discharge and itching  Respiratory: Negative for apnea, chest tightness and shortness of breath  Cardiovascular: Negative for chest pain, palpitations and leg swelling  Gastrointestinal: Negative for abdominal distention, abdominal pain, blood in stool, nausea and vomiting     Endocrine: Negative for cold intolerance, heat intolerance, polydipsia, polyphagia and polyuria  Genitourinary: Negative for difficulty urinating  Musculoskeletal: Negative for arthralgias  Skin: Negative for color change and wound  Neurological: Negative for dizziness, syncope, speech difficulty and headaches  Hematological: Negative for adenopathy  Psychiatric/Behavioral: Negative for agitation and behavioral problems  Current Outpatient Medications   Medication Sig Dispense Refill   • aspirin 81 mg chewable tablet Chew 81 mg daily     • atorvastatin (LIPITOR) 40 mg tablet TAKE 1 TABLET BY MOUTH  DAILY 90 tablet 3   • Blood Glucose Monitoring Suppl (OneTouch Verio Reflect) w/Device KIT Check blood sugars twice daily  Please substitute with appropriate alternative as covered by patient's insurance  Dx: E11 65 1 kit 0   • cloNIDine (CATAPRES) 0 1 mg tablet take 1 tablet by mouth every 12 hours 180 tablet 1   • clopidogrel (PLAVIX) 75 mg tablet Take 1 tablet (75 mg total) by mouth daily 90 tablet 1   • fenofibrate 160 MG tablet TAKE 1 TABLET BY MOUTH  DAILY 90 tablet 3   • gabapentin (NEURONTIN) 600 MG tablet TAKE 1 TABLET BY MOUTH  TWICE DAILY 180 tablet 3   • glimepiride (AMARYL) 2 mg tablet Take 1 tablet (2 mg total) by mouth 2 (two) times a day 180 tablet 1   • glucose blood (OneTouch Verio) test strip Check blood sugars twice daily  Please substitute with appropriate alternative as covered by patient's insurance   Dx: E11 65 200 each 3   • Insulin Glargine Solostar (Lantus SoloStar) 100 UNIT/ML SOPN Inject 0 15 mL (15 Units total) under the skin every evening 15 mL 0   • Insulin Pen Needle 32G X 4 MM MISC Use every evening 100 each 5   • isosorbide mononitrate (IMDUR) 60 mg 24 hr tablet TAKE 1 TABLET BY MOUTH DAILY 90 tablet 3   • Jardiance 25 MG TABS TAKE 1 TABLET BY MOUTH IN  THE MORNING 90 tablet 3   • metFORMIN (GLUCOPHAGE) 500 mg tablet TAKE 1 TABLET BY MOUTH  TWICE DAILY WITH MEALS 180 tablet 3   • metoprolol tartrate (LOPRESSOR) 50 mg tablet Take 1 tablet (50 mg total) by "mouth every 12 (twelve) hours 60 tablet 0   • Multiple Vitamins-Minerals (MULTIVITAMIN MEN 50+ PO) Take by mouth daily     • nitroglycerin (NITROSTAT) 0 4 mg SL tablet Place 1 tablet (0 4 mg total) under the tongue every 5 (five) minutes as needed for chest pain 30 tablet 3   • Omega-3 Fatty Acids (fish oil) 1,000 mg Take 4,000 mg by mouth daily      • ondansetron (ZOFRAN) 4 mg tablet Take 1 tablet (4 mg total) by mouth every 8 (eight) hours as needed for nausea or vomiting 10 tablet 0   • OneTouch Delica Lancets 00J MISC Check blood sugars twice daily  Please substitute with appropriate alternative as covered by patient's insurance  Dx: E11 65 200 each 3   • ranolazine (RANEXA) 500 mg 12 hr tablet Take 1 tablet (500 mg total) by mouth 2 (two) times a day 60 tablet 3   • sitaGLIPtin (JANUVIA) 100 mg tablet Take 100 mg by mouth daily     • torsemide (DEMADEX) 20 mg tablet Take 1 tablet (20 mg total) by mouth daily 30 tablet 0   • cadexomer iodine (IODOSORB) 0 9 % gel Apply 1 application topically daily as needed for wound care (Patient not taking: Reported on 6/29/2023) 10 g 0   • ciclopirox (LOPROX) 0 77 % cream Apply topically 2 (two) times a day for 20 days 90 g 2     No current facility-administered medications for this visit  Objective:    /60   Pulse 56   Temp 98 °F (36 7 °C)   Resp 18   Ht 6' 2\" (1 88 m)   Wt 108 kg (237 lb)   SpO2 97%   BMI 30 43 kg/m²        Physical Exam  Vitals and nursing note reviewed  Constitutional:       General: He is not in acute distress  Appearance: He is well-developed  He is not diaphoretic  HENT:      Head: Normocephalic and atraumatic  Right Ear: External ear normal       Left Ear: External ear normal       Nose: Nose normal       Mouth/Throat:      Pharynx: No oropharyngeal exudate  Eyes:      General: No scleral icterus  Right eye: No discharge  Left eye: No discharge        Pupils: Pupils are equal, round, and reactive to " light    Neck:      Thyroid: No thyromegaly  Cardiovascular:      Rate and Rhythm: Normal rate  Heart sounds: Normal heart sounds  No murmur heard  Pulmonary:      Effort: Pulmonary effort is normal  No respiratory distress  Breath sounds: Normal breath sounds  No wheezing  Abdominal:      General: Bowel sounds are normal  There is no distension  Palpations: Abdomen is soft  There is no mass  Tenderness: There is no abdominal tenderness  There is no guarding or rebound  Musculoskeletal:         General: Normal range of motion  Skin:     General: Skin is warm and dry  Findings: No erythema or rash  Neurological:      Mental Status: He is alert  Coordination: Coordination normal       Deep Tendon Reflexes: Reflexes normal    Psychiatric:         Behavior: Behavior normal               TCM Call     Date and time call was made  6/19/2023  2:47 PM    Hospital care reviewed  Records reviewed    Patient was hospitialized at  [address] [place] [address]    Date of Admission  06/16/23    Date of discharge  06/19/23    Diagnosis  Acute on chronic diastolic heart failure    Disposition  Home    Were the patients medications reviewed and updated  No  He declined but knows to call if any questions    Current Symptoms  --  Nausea-will be picking up Zofran at the pharmacy today      TCM Call     Post hospital issues  None    Should patient be enrolled in anticoag monitoring? No    Scheduled for follow up?   Yes    Patients specialists  Cardiologist; Nephrologist; Other (comment)    Cardiologist name  Dr Zakiya Antonio     Other specialists contcat #  Sleep Lab    Did you obtain your prescribed medications  Yes    Do you need help managing your prescriptions or medications  No    Is transportation to your appointment needed  No    I have advised the patient to call PCP with any new or worsening symptoms  [place]  Family    The type of support provided Physical; Emotional    Do you have social support  Yes, as much as I need    Are you recieving any outpatient services  No    Are you recieving home care services  No    Have you fallen in the last 12 months  No    Interperter language line needed  No    Counseling  Patient    Counseling topics  Activities of daily living; Importance of RX compliance; patient and family education; instructions for management; Risk factor reduction    Comments  Ramya Villa states that he is doing ok  He has nausea and he is picking up his Zofran at the pharmacy later today  He has 2 daughters who help him and they both live close by   He knows to go to the ER if any chest pain, dyspnea, weakness, etc [facility]            Brenda Nguyen, DO

## 2023-07-03 ENCOUNTER — EVALUATION (OUTPATIENT)
Dept: PHYSICAL THERAPY | Facility: CLINIC | Age: 78
End: 2023-07-03
Payer: COMMERCIAL

## 2023-07-03 DIAGNOSIS — R26.89 IMBALANCE: ICD-10-CM

## 2023-07-03 DIAGNOSIS — R26.9 NEUROLOGIC GAIT DYSFUNCTION: Primary | ICD-10-CM

## 2023-07-03 PROCEDURE — 97162 PT EVAL MOD COMPLEX 30 MIN: CPT

## 2023-07-03 PROCEDURE — 97110 THERAPEUTIC EXERCISES: CPT

## 2023-07-03 NOTE — PROGRESS NOTES
PT Evaluation     Today's date: 7/3/2023  Patient name: Richard Srinivasan  : 1945  MRN: [id number]  Referring provider: Leslie Rios DO  Dx:   Encounter Diagnosis     ICD-10-CM    1. Neurologic gait dysfunction  R26.9 Ambulatory Referral to Physical Therapy      2. Imbalance  R26.89                      Assessment  Assessment details: Richard Srinivasan is a 78 y.o. male who presents with low back pain, decreased strength, decreased ROM, decreased sensation, impaired sensation, postural dysfunction, ambulatory dysfunction and balance dysfunction. Due to these impairments, patient has difficulty performing ADL's, recreational activities, ambulation, stair negotiation, transfers. Patient's clinical presentation is consistent with their referring diagnosis of Neurologic gait dysfunction  (primary encounter diagnosis) and Imbalance. FGA indicated risk for falls at this time with patient demo heavy reliance on visual system for balance. Patient ambulating with forward trunk lean secondary to increased muscle tension in hip flexors and poor core and back strength. Patient has been educated in home exercise program and plan of care.  Patient would benefit from skilled physical therapy services to address their aforementioned functional limitations and progress towards prior level of function and independence with home exercise program.     Impairments: abnormal gait, abnormal or restricted ROM, impaired balance, impaired physical strength, lacks appropriate home exercise program, pain with function, safety issue, poor posture  and poor body mechanics  Understanding of Dx/Px/POC: good   Prognosis: good    Goals  Short term goals to be achieved by 4 weeks:   STG1: Patient will be independent with simple HEP to maximize progress between therapy sessions  STG2: Patient will improve FGA score by 4 points per [facility] to promote improved safety with dynamic tasks  STG3: Patient will performed FTEO on foam with </= mild sway for 30 sec to demo improved balance  STG4: Patient will perform FTEC on firm with </= mild sway for 30 sec to demo improved balance  STG5: Patient will demo tandem walking of 4 steps to improved FGA score and demo improved balance      Long Term Goals to be achieved by 12 weeks:   LTG1: Patient will be independent with updated HEP to maximize progress between therapy sessions  LTG2: Patient will score low risk for falls with FGA test with score of 23/30 or higher per current research data   LTG3: Patient will demo FTEC on foam with </= mild sway for 30 sec to demo improved static balance   LTG4: Patient will demo tandem walking of 10 steps to improve FGA score and demo improved dynamic balance  LTG5: Patient will improve 5xSTS to 13 sec or less to demo decreased risk for falls, and improved functional mobility        Plan  Plan details: HEP development, stretching, strengthening, A/AA/PROM, joint mobilizations, posture education, STM/MI as needed to reduce muscle tension, muscle reeducation, PLOC discussed and agreed upon with patient  Patient would benefit from: PT eval and skilled physical therapy  Planned modality interventions: cryotherapy and thermotherapy: hydrocollator packs  Planned therapy interventions: balance/weight bearing training, coordination, gait training, home exercise program, therapeutic exercise, therapeutic activities, strengthening, patient education, neuromuscular re-education and abdominal trunk stabilization  Frequency: 2x week  Plan of Care beginning date: 7/3/2023  Plan of Care expiration date: 9/25/2023  Treatment plan discussed with: patient        Subjective Evaluation    History of Present Illness  Mechanism of injury: Patient reports to PT with concerns with balance especially with ambulation. Patient stated he has had issues for a while but it has gotten progressively worse over the last year.  Patient reports he is very fearful of falling especially in the dark and in the shower (eyes closed or limited vision). Pain  At best pain ratin  At worst pain ratin  Location: low back pain   Quality: dull ache  Relieving factors: change in position and rest  Aggravating factors: walking and stair climbing    Social Support  Steps to enter house: yes  3  Stairs in house: no   Lives in: [facility] house  Lives with: alone    Treatments  No previous or current treatments  Patient Goals  Patient goals for therapy: improved balance, increased motion, increased strength, independence with ADLs/IADLs and return to sport/leisure activities          Objective      LE Strength: MMT      R  L  Hip flexion:    4+/5  4+/5  Hip abduction:   4/5  4/5  Knee extension:  4+/5  4+/5  Knee flexion:    4+/5  4+/5  Ankle dorsiflexion:  4+/5  4+/5  Ankle plantarflexion:   4+/5  4+/5      Flexibility:  Hamstrings: mod increase in muscle tension  Hip flexors: mod increase in muscle tension     Function:  Gait: Patient ambulating with forward trunk lean reporting upright posture causes pain into low back and he feels he cannot hold that posture well as his muscle fatigue. Noted increased muscle tension in hip flexor. Stairs: TBA     Sensation: patient has diagnosed neuropathy B/L   7/3/2023:  DERMATOMAL TESTING:          L2 anterior thigh: intact B/L     L3 medial knee:  intact B/L   L4 medial maleolus:  absent B/L   L5 1st web space:   absent B/L   S1 lateral/sole foot:  impaired on RLE absent on LLE  S2 popliteal fossa    intact B/L         Coordination  7/3/2023:  - Dysmetria: intact for speed and accuracy   - Dysdiadochokinesia: intact for speed and accuracy       Transfers:   7/3/2023: able to perform STS and supine to/from siting with min difficulty. Cut off scores:   All data taken from [facility] or Rehab Measures    [place]: <46/56=falls risk  MDC: 6 pts  Age Norms:  57-79: M - 54   F - 55  70-79: M - 47   F - 53  80-89: M - 48   F - 50 5xSTS: Gail et al 2010  MDC: 2.3 sec  Age Norms:  60-69: 11.1 sec  70-79: 12.6 sec  80-89: 14.8 sec   TUG  MDC: 4.14 sec  Cut off score:  >13.5 sec community dwelling adults  >32.2 sec frail elderly  <20 sec:  I for basic transfers  >30: dependent for transfers 10 Meter Walk Test Norms: Cherry gutierrez 2011  20-29: M - 1.35 m/s   F - 1.34 m/s  30-39: M - 1.43 m/s   F - 1.34 m/s  40-49: M - 1.43 m/s   F - 1.39 m/s  50-59: M - 1.43 m/s   F - 1.31 m/s  60-69: M - 1.34 m/s   F - 1.24 m/s  70-79: M - 1.26 m/s   F - 1.13 m/s  80-89: M - 0.97 m/s   F - 0.94 m/s   FGA  MCID: 4 pts  Geriatrics/community < 22/30 fall risk  Geriatrics/community < 20/30 unexplained falls DGI  MDC: vestibular - 4 pts  MDC: geriatric/community - 3 pts  Falls risk <19/24   6 Minute Walk Test  Age Norms  57-79: M - 1876 ft (571.80 m)  F - 1765 ft (537.98 m)  70-79: M - 1729 ft (527.00 m)  F - 1545 ft (470.92 m)  80-89: M - 1368 ft (416.97 m)  F - 1286 ft (391.97 m) mCTSIB  Norm: 20-60 yrs  Eyes open firm: norm sway 0.21-0.48  Eyes closed firm: norm sway 0.48-0.99  Eyes open foam: norm sway 0.38-0.71  Eyes closed foam: norm sway 0.70-2.22       Outcome Measures Initial Eval  7/3/2023        5xSTS 17.67 sec        TUG 9.75 sec B/L UE push off        10 meter TBA ft/sec  m/s        VALENTE NT/56        FGA 14/30        mCTSIB  - FTEO (firm)  - FTEC (firm)    - FTEO (foam)  - FTEC (foam)   60 sec erect  15 sec mod sway   TBA sec  TBA sec        6MWT TBA meters in  min        ABC 44%         DHI NT               Precautions:   Past Medical History:   Diagnosis Date   • CAD (coronary artery disease)    • Cancer ([address])     prostate   • CHF (congestive heart failure) ([address])    • Diabetes mellitus ([address])    • Myocardial infarction ([address])      SOC: 7/3/2023  FOTO: NA  POC Expiration: 9/25/2023  Daily Treatment Log:  Date 7/3/2023       Visit #        Auth         Manual                        There Exer 8'       LAQ        Bridges        HR/TR         Std hip ext         Std hip abd        Supine hip flexor stretch  20" x3        Seated hamstring stretch         Seated lumbar flexion  1x10        palloff press                 HEP Created ad discussed        There Activ                                                        NMReed        FTEO foam         FTEC firm         Tandem walking         Monster walks         Side stepping         STS        Fwd/retro amb         Amb with head turns         Walking marches         Posture over correct                 Modalities                                HEP:   Access Code: [email or web address]  URL: [email or web address]  Date: 07/03/2023  Prepared by: Zakia Razo    Exercises  - Modified Alexandre Stretch  - 1 x daily - 7 x weekly - 3 reps - 20 sec  hold  - Seated Lumbar Flexion Stretch  - 2-3 x daily - 7 x weekly - 1 sets - 10 reps  - Narrow Stance with Head Rotations and Counter Support  - 1 x daily - 7 x weekly - 2 reps - 20-30 hold  - Tandem Walking with Counter Support  - 1 x daily - 7 x weekly - 1 sets - 10 reps

## 2023-07-06 ENCOUNTER — TELEPHONE (OUTPATIENT)
Dept: INPATIENT UNIT | Facility: HOSPITAL | Age: 78
End: 2023-07-06

## 2023-07-06 NOTE — TELEPHONE ENCOUNTER
CHF Evaluation/Screening Form    Patient Level Data  Encounter Level Data  Knowledge Assessment/Post-Discharge Questions  Following Diet: Foods to Limit/Avoid Salt: Yes  Daily Weights Done?: Yes  Date of last weight: 7/6/23  Weight recorded: 230  Knows name of medication/water pill?: Yes  Understands Activity/Exercise:  Yes  Knows when to report symptoms?: Yes  Plan in place to call for worsening symptoms?: Yes  Does the patient have a means of transporation?: Yes

## 2023-07-07 DIAGNOSIS — I49.3 PVC (PREMATURE VENTRICULAR CONTRACTION): ICD-10-CM

## 2023-07-09 RX ORDER — RANOLAZINE 500 MG/1
TABLET, EXTENDED RELEASE ORAL
Qty: 60 TABLET | Refills: 3 | Status: SHIPPED | OUTPATIENT
Start: 2023-07-09

## 2023-07-10 ENCOUNTER — OFFICE VISIT (OUTPATIENT)
Dept: CARDIOLOGY CLINIC | Facility: CLINIC | Age: 78
End: 2023-07-10
Payer: COMMERCIAL

## 2023-07-10 ENCOUNTER — OFFICE VISIT (OUTPATIENT)
Dept: PHYSICAL THERAPY | Facility: CLINIC | Age: 78
End: 2023-07-10
Payer: COMMERCIAL

## 2023-07-10 VITALS
BODY MASS INDEX: 30.54 KG/M2 | SYSTOLIC BLOOD PRESSURE: 120 MMHG | WEIGHT: 238 LBS | OXYGEN SATURATION: 95 % | HEIGHT: 74 IN | HEART RATE: 54 BPM | DIASTOLIC BLOOD PRESSURE: 60 MMHG

## 2023-07-10 DIAGNOSIS — I10 ESSENTIAL HYPERTENSION: ICD-10-CM

## 2023-07-10 DIAGNOSIS — I27.20 PULMONARY HYPERTENSION (HCC): ICD-10-CM

## 2023-07-10 DIAGNOSIS — Z98.890 S/P AAA REPAIR: ICD-10-CM

## 2023-07-10 DIAGNOSIS — E78.2 MIXED HYPERLIPIDEMIA: ICD-10-CM

## 2023-07-10 DIAGNOSIS — Z86.79 S/P AAA REPAIR: ICD-10-CM

## 2023-07-10 DIAGNOSIS — E11.42 TYPE 2 DIABETES MELLITUS WITH DIABETIC POLYNEUROPATHY, WITHOUT LONG-TERM CURRENT USE OF INSULIN (HCC): ICD-10-CM

## 2023-07-10 DIAGNOSIS — I50.32 CHRONIC DIASTOLIC HEART FAILURE (HCC): ICD-10-CM

## 2023-07-10 DIAGNOSIS — R26.89 IMBALANCE: ICD-10-CM

## 2023-07-10 DIAGNOSIS — R26.9 NEUROLOGIC GAIT DYSFUNCTION: Primary | ICD-10-CM

## 2023-07-10 DIAGNOSIS — I20.8 STABLE ANGINA PECTORIS (HCC): ICD-10-CM

## 2023-07-10 DIAGNOSIS — Z95.1 S/P CABG X 3: ICD-10-CM

## 2023-07-10 DIAGNOSIS — N18.32 STAGE 3B CHRONIC KIDNEY DISEASE (HCC): ICD-10-CM

## 2023-07-10 DIAGNOSIS — I25.10 CORONARY ARTERY DISEASE INVOLVING NATIVE CORONARY ARTERY OF NATIVE HEART WITHOUT ANGINA PECTORIS: ICD-10-CM

## 2023-07-10 DIAGNOSIS — G47.33 OBSTRUCTIVE SLEEP APNEA: ICD-10-CM

## 2023-07-10 PROCEDURE — 97112 NEUROMUSCULAR REEDUCATION: CPT

## 2023-07-10 PROCEDURE — 97110 THERAPEUTIC EXERCISES: CPT

## 2023-07-10 PROCEDURE — 99214 OFFICE O/P EST MOD 30 MIN: CPT | Performed by: INTERNAL MEDICINE

## 2023-07-10 NOTE — PROGRESS NOTES
Progress Note - Cardiology Office  [facility]    Chica Kessler 78 y.o. male MRN: [id number]  : 1945  Encounter: [id number]      Assessment:     1. Coronary artery disease involving native coronary artery of native heart without angina pectoris    2. Stable angina pectoris ([address])    3. S/P CABG x 3    4. Essential hypertension    5. Mixed hyperlipidemia    6. S/P AAA repair    7. Stage 3b chronic kidney disease ([address])    8. Type 2 diabetes mellitus with diabetic polyneuropathy, without long-term current use of insulin ([address])    9. Chronic diastolic heart failure ([address])    10. Obstructive sleep apnea    11. Pulmonary hypertension ([address])        Discussion Summary and Plan:    1. Coronary artery disease postcoronary artery bypass surgery with three-vessel bypass patient with history of three-vessel bypass. His last cath in 2022 shows only patent LIMA to LAD. Proximal % occluded, proximal % occluded. Vein graft to % occluded. Obtuse marginal 2. 100% occluded. Vein graft to OM 2, 100% occluded. AV groove circumflex is widely patent which feeds posterior lateral branches. Patient has grade 1 to grade 2 collaterals to right circulation from the left circulation. EF is 50%. Aspirin and Plavix for 6-month then he will be only on aspirin. His exertional shortness of breath is better    2. Essential hypertension. Patient also follow-up with nephrology. Currently is taking amlodipine 10 mg daily, hydrochlorothiazide 12.5, Jardiance 25, losartan 1 mg daily, Toprol-XL 50 mg daily and clonidine patch 0.1 every 12 hourly along with Imdur. Pressure is acceptable. 3.  Chronic stable angina. Currently he get exertional symptoms only. Continue aspirin and Plavix along with Imdur and Ranexa. Symptoms of angina    4. Diabetes mellitus. On multiple medications. Management as per medical team.    5.  Mixed hyperlipidemia.   Continue statin and fenofibrate cholesterol profile acceptable Cresta profile is improved    6. S/p AAA repair management as per vascular    7. CKD stage III. [facility] Ne[facility] note noted creatinine 2.0. He follow-up with nephrology. 8.  Obesity with a BMI around 32 encouraged him to lose weight. 9.  Obstructive sleep apnea. Patient has history of obstructive sleep apnea could not tolerate CPAP machine does not want to use it. 10.  Pulmonary hypertension. 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 He is asymptomatic does not use CPAP machine we will continue to monitor. Continue current Rx follow-up 6 months. Patient  was advised and educated to call our office  immediately if  patient has any new symptoms of chest pain/shortness of breath, near-syncope, syncope, light headedness sustained palpitations  or any other cardiovascular symptoms before their scheduled follow-up appointment. Office number was provided #[phone]. Please call [phone] if any questions. Counseling :  A description of the counseling. Goals and Barriers. Patient's ability to self care: Yes  Medication side effect reviewed with patient in detail and all their questions answered to their satisfaction. HPI :     Cabrera Coelho is a 78y.o. year old male who came for follow up. Patient has medical history significant for coronary artery disease s/p CABG in 2003 with three-vessel bypass which was LIMA to LAD, vein graft to RCA and vein graft to circumflex. He has lost his vein graft to RCA and circumflex. His LIMA to LAD is widely patent. Cardiac catheterization also shows severe three-vessel disease with occlusion of obtuse marginal 1 vein graft, and RCA is 100% occluded and LAD is proximally 100% occluded. He had a stent which is widely patent in the circumflex. His EKG has shown sinus rhythm with heart rate 80 bpm in March 2022.   He has history of chronic stable angina, type 2 diabetes mellitus, dyslipidemia, CRI. Recently he lost his wife. He is otherwise doing well. He lives alone and his family member help take care of him. He does have history of abdominal aortic aneurysm and PVD s/p repair. His med list reviewed. No nausea no vomiting no fever no chills he has been doing well today. His last blood test was on 6/1/2023 which shows his creatinine 1.63 his cholesterol profile is acceptable vitals are stable and heart rate is 76 bpm.    Past surgical history is also significant for abdominal aortic aneurysm repair, gallbladder surgery, history of prostate cancer s/p prostatectomy. No recent surgery. Echo as well as his cath report and images reviewed with him. To follow-up with vascular. We will refer him back to him. 7/10/2023. Above reviewed. Patient came for follow-up. His labs reviewed creatinine is around 2. Echo done recently reviewed and found to have PA pressure around 50 to 60 mmHg. He has a history of sleep apnea could not tolerate CPAP machine do not like to use it. Otherwise she is feeling well. Other risk as mentioned above. Review of Systems   Constitutional: Negative for activity change, chills, diaphoresis, fever and unexpected weight change. HENT: Negative for congestion. Eyes: Negative for discharge and redness. Respiratory: Positive for shortness of breath. Negative for cough, chest tightness and wheezing. Chronic exertional but recently improved with   Cardiovascular: Negative. Negative for chest pain, palpitations and leg swelling. Gastrointestinal: Negative for abdominal pain, diarrhea and nausea. Endocrine: Negative. Genitourinary: Negative for decreased urine volume and urgency. Musculoskeletal: Positive for arthralgias and back pain. Negative for gait problem. Skin: Negative for rash and wound. Allergic/Immunologic: Negative.     Neurological: Negative for dizziness, seizures, syncope, weakness, light-headedness and headaches. Hematological: Negative. Psychiatric/Behavioral: Negative for agitation and confusion. Historical Information   Past Medical History:   Diagnosis Date   • CAD (coronary artery disease)    • Cancer ([address] [facility])     prostate   • CHF (congestive heart failure) ([address] [facility])    • Diabetes mellitus ([address] [facility])    • Myocardial infarction [facility])      Past Surgical History:   Procedure Laterality Date   • ADENOIDECTOMY     • CARDIAC CATHETERIZATION Left 10/19/2022    Procedure: Cardiac Left Heart Cath;  Surgeon: Roberto Fuentes MD;  [facility]; Service: Cardiology   • CARDIAC SURGERY  2002    3 cardiac bypass then angioplasty 2020   • CHOLECYSTECTOMY     • CORONARY ARTERY BYPASS GRAFT     • PROSTATE SURGERY     • TONSILLECTOMY       Social History     Substance and Sexual Activity   Alcohol Use Yes   • Alcohol/week: 14.0 standard drinks of alcohol   • Types: 14 Cans of beer per week    Comment: 1 -2 daily     Social History     Substance and Sexual Activity   Drug Use Never     Social History     Tobacco Use   Smoking Status Former   • Packs/day: 1.50   • Years: 33.00   • Total pack years: 49.50   • Types: Cigarettes   • Start date:    • Quit date:    • Years since quittin.5   Smokeless Tobacco Never     Family History:   Family History   Problem Relation Age of Onset   • Diabetes Mother    • Alcohol abuse Father    • Mental illness Neg Hx        Meds/Allergies     Allergies   Allergen Reactions   • Lisinopril Rash and Lip Swelling       Current Outpatient Medications:   •  aspirin 81 mg chewable tablet, Chew 81 mg daily, Disp: , Rfl:   •  atorvastatin (LIPITOR) 40 mg tablet, TAKE 1 TABLET BY MOUTH  DAILY, Disp: 90 tablet, Rfl: 3  •  Blood Glucose Monitoring Suppl (OneTouch Verio Reflect) w/Device KIT, Check blood sugars twice daily. Please substitute with appropriate alternative as covered by patient's insurance.  Dx: E11.65, Disp: 1 kit, Rfl: 0  •  cloNIDine (CATAPRES) 0.1 mg tablet, take 1 tablet by mouth every 12 hours, Disp: 180 tablet, Rfl: 1  •  clopidogrel (PLAVIX) 75 mg tablet, Take 1 tablet (75 mg total) by mouth daily, Disp: 90 tablet, Rfl: 1  •  fenofibrate 160 MG tablet, TAKE 1 TABLET BY MOUTH  DAILY, Disp: 90 tablet, Rfl: 3  •  gabapentin (NEURONTIN) 600 MG tablet, TAKE 1 TABLET BY MOUTH  TWICE DAILY, Disp: 180 tablet, Rfl: 3  •  glimepiride (AMARYL) 2 mg tablet, Take 1 tablet (2 mg total) by mouth 2 (two) times a day, Disp: 180 tablet, Rfl: 1  •  glucose blood (OneTouch Verio) test strip, Check blood sugars twice daily. Please substitute with appropriate alternative as covered by patient's insurance.  Dx: E11.65, Disp: 200 each, Rfl: 3  •  Insulin Glargine Solostar (Lantus SoloStar) 100 UNIT/ML SOPN, Inject 0.15 mL (15 Units total) under the skin every evening, Disp: 15 mL, Rfl: 0  •  Insulin Pen Needle 32G X 4 MM MISC, Use every evening, Disp: 100 each, Rfl: 5  •  isosorbide mononitrate (IMDUR) 60 mg 24 hr tablet, TAKE 1 TABLET BY MOUTH DAILY, Disp: 90 tablet, Rfl: 3  •  Jardiance 25 MG TABS, TAKE 1 TABLET BY MOUTH IN  THE MORNING, Disp: 90 tablet, Rfl: 3  •  metFORMIN (GLUCOPHAGE) 500 mg tablet, TAKE 1 TABLET BY MOUTH  TWICE DAILY WITH MEALS, Disp: 180 tablet, Rfl: 3  •  metoprolol tartrate (LOPRESSOR) 50 mg tablet, Take 1 tablet (50 mg total) by mouth every 12 (twelve) hours, Disp: 60 tablet, Rfl: 0  •  Multiple Vitamins-Minerals (MULTIVITAMIN MEN 50+ PO), Take by mouth daily, Disp: , Rfl:   •  nitroglycerin (NITROSTAT) 0.4 mg SL tablet, Place 1 tablet (0.4 mg total) under the tongue every 5 (five) minutes as needed for chest pain, Disp: 30 tablet, Rfl: 3  •  Omega-3 Fatty Acids (fish oil) 1,000 mg, Take 4,000 mg by mouth daily , Disp: , Rfl:   •  ondansetron (ZOFRAN) 4 mg tablet, Take 1 tablet (4 mg total) by mouth every 8 (eight) hours as needed for nausea or vomiting, Disp: 10 tablet, Rfl: 0  •  OneTouch Delica Lancets 94W MISC, Check blood sugars twice daily. Please substitute with appropriate alternative as covered by patient's insurance. Dx: E11.65, Disp: 200 each, Rfl: 3  •  ranolazine (RANEXA) 500 mg 12 hr tablet, take 1 tablet by mouth twice a day, Disp: 60 tablet, Rfl: 3  •  sitaGLIPtin (JANUVIA) 100 mg tablet, Take 100 mg by mouth daily, Disp: , Rfl:   •  torsemide (DEMADEX) 20 mg tablet, Take 1 tablet (20 mg total) by mouth daily, Disp: 30 tablet, Rfl: 0  •  cadexomer iodine (IODOSORB) 0.9 % gel, Apply 1 application topically daily as needed for wound care (Patient not taking: Reported on 6/29/2023), Disp: 10 g, Rfl: 0  •  ciclopirox (LOPROX) 0.77 % cream, Apply topically 2 (two) times a day for 20 days, Disp: 90 g, Rfl: 2    Vitals: Blood pressure 120/60, pulse (!) 54, height 6' 2" (1.88 m), weight 108 kg (238 lb), SpO2 95 %. ?  Body mass index is 30.56 kg/m². Wt Readings from Last 3 Encounters:   07/10/23 108 kg (238 lb)   06/29/23 108 kg (237 lb)   06/19/23 105 kg (232 lb 6.4 oz)     Vitals:    07/10/23 1037   Weight: 108 kg (238 lb)     BP Readings from Last 3 Encounters:   07/10/23 120/60   06/29/23 106/60   06/19/23 143/72       Physical Exam    Neurologic:  Alert & oriented x 3, no new focal deficits, Not in any acute distress,  Constitutional:    Adequate built,  non-toxic appearance   Neck: No tenderness,  Neck supple, No JVP,   Respiratory:  Bilateral air entry, mostly clear to auscultation  Cardiovascular: S1-S2 regular with a 2/6 ejection systolic murmur and S4 is present  GI:  Soft, nondistended, no hepatosplenomegaly appreciated. Musculoskeletal:  No edema, no tenderness, no deformities. Skin:  Well hydrated, no rash   Extremities:  No edema and distal pulses are present  Psychiatric:  Speech and behavior appropriate         Diagnostic Studies Review Cardio:    Patient has cardiac catheterization done on 2/1/2022 which shows that he has history of coronary artery with three-vessel bypass.   He has lost his vein graft to RCA, % occluded, vein graft to circumflex also occluded, stent placed in distal circumflex is widely patent, LIMA to LAD was widely patent and LAD proximally 100% occluded. Echo Doppler. Echo Doppler done in 7 6/26/2022 shows EF 50 to 70%, grade 1 diastolic dysfunction, left atrium mildly dilated, thickened aortic valve with mild aortic stenosis and mild MR and mild TR. Aortic root is mildly dilated. Echo Doppler. Echo Doppler done 6/17/2023 shows mild to moderate left medical hypertrophy, EF 53%, grade 2 diastolic dysfunction, left atrium moderately dilated, thickened aortic valve with mild to moderate stenosis, mild tricuspid regurgitation with moderate to severely elevated pulmonary artery pressure 50 to 60 mmHg aortic root mildly dilated    EKG:        Results for orders placed during the hospital encounter of 02/16/22    NM Myocardial Perfusion Spect (Pharmacological Induced Stress and/or Rest)    Interpretation Summary  •  Stress Combined Conclusion: Left ventricular perfusion is abnormal. There is mild photon reduction in the anterior wall at stress. Findings are consistent with ischemia. Additional area of infarct involving the mid and apical portion of the inferior wall. •  Stress Function: Left ventricular function post-stress is abnormal. Global function is mildly reduced. There were no regional wall motion abnormalities during stress. Post-stress ejection fraction is 40 %. •  Stress ECG: No ST deviation is noted. There were no arrhythmias during recovery. . The ECG was negative for ischemia. •  Perfusion: There is a left ventricular perfusion defect that is small in size with severe reduction in uptake present in the mid to apical inferior location(s) that is fixed. There is a left ventricular perfusion defect that is small to medium in size with mild reduction in uptake present in the mid to apical anterior location(s) that is reversible.     XR chest 1 view portable    Result Date: 5/11/2023  Narrative: CHEST INDICATION:   elevated troponin. COMPARISON: Chest radiograph dated 3/1/2023. EXAM PERFORMED/VIEWS:  XR CHEST PORTABLE FINDINGS: Status post median sternotomy. Cardiomediastinal silhouette is stably enlarged. No focal airspace consolidation. No pneumothorax or pleural effusion. Osseous structures appear within normal limits for patient age. Impression: No acute cardiopulmonary disease. Workstation performed: [id number]       Imaging:  Chest X-Ray:   XR chest pa & lateral    Result Date: 3/2/2023  Impression No acute cardiopulmonary disease. Workstation performed: [id number]       Lab Review   Lab Results   Component Value Date    WBC 10.71 (H) 06/17/2023    HGB 11.9 (L) 06/17/2023    HCT 37.1 06/17/2023    MCV 85 06/17/2023    RDW 13.8 06/17/2023     06/17/2023     BMP:  Lab Results   Component Value Date    SODIUM 136 06/26/2023    K 4.3 06/26/2023     06/26/2023    CO2 28 06/26/2023    BUN 48 (H) 06/26/2023    CREATININE 2.09 (H) 06/26/2023    GLUC 147 (H) 06/19/2023    GLUF 74 06/26/2023    CALCIUM 9.6 06/26/2023    CORRECTEDCA 10.5 (H) 03/09/2023    EGFR 29 06/26/2023    MG 2.2 06/19/2023     Troponins:    LFT:  Lab Results   Component Value Date    AST 14 06/17/2023    ALT 13 06/17/2023    ALKPHOS 34 06/17/2023    TP 7.1 06/17/2023    ALB 3.7 06/17/2023        Lab Results   Component Value Date    HGBA1C 6.3 (H) 06/01/2023     Lipid Profile:   Lab Results   Component Value Date    CHOLESTEROL 114 03/09/2023    HDL 39 (L) 03/09/2023    LDLCALC 56 03/09/2023    TRIG 93 03/09/2023     Lab Results   Component Value Date    CHOLESTEROL 114 03/09/2023    CHOLESTEROL 131 07/19/2022       Lab Results   Component Value Date    NTBNP 420 02/16/2022            Dr. Gregorio Still MD [facility]        "This note was completed in part utilizing [employer] direct voice recognition software.    Grammatical errors, random word insertion, spelling mistakes, and incomplete sentences may be an occasional consequence of the system secondary to software limitations, ambient noise and hardware issues. Please read the chart carefully and recognize, using context, where substitutions have occurred.   If you have any questions or concerns about the context, text or information contained within the body of this dictation, please contact myself, the provider, for further clarification."

## 2023-07-10 NOTE — PROGRESS NOTES
Daily Note     Today's date: 7/10/2023  Patient name: Elaine Linares  : 1945  MRN: [id number]  Referring provider: Delaney Santoyo DO  Dx:   Encounter Diagnosis     ICD-10-CM    1. 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 Imbalance  R26.89                      Subjective: Patient reports he is ok today and has been doing HEP. Objective: See treatment diary below      Assessment: Tolerated treatment well with mild LOB with FTEO on firm w/ vert head turns, patient able to recover with no assistance required PT Was providing CS and gave CGA with LOB. Patient demonstrated fatigue post treatment and would benefit from continued PT      Plan: Continue per plan of care. Cut off scores: All data taken from [facility] Neuro Section or Rehab Measures    [place]: <46/56=falls risk  MDC: 6 pts  Age Norms:  57-79: M - 54   F - 55  70-79: M - 47   F - 53  80-89: M - 48   F - 50 5xSTS: Gail et al 2010  MDC: 2.3 sec  Age Norms:  60-69: 11.1 sec  70-79: 12.6 sec  80-89: 14.8 sec   TUG  MDC: 4.14 sec  Cut off score:  >13.5 sec community dwelling adults  >32.2 sec frail elderly  <20 sec:  I for basic transfers  >30: dependent for transfers 10 Meter Walk Test Norms: Aleida gutierrez   20-29: M - 1.35 m/s   F - 1.34 m/s  30-39: M - 1.43 m/s   F - 1.34 m/s  40-49: M - 1.43 m/s   F - 1.39 m/s  50-59: M - 1.43 m/s   F - 1.31 m/s  60-69: M - 1.34 m/s   F - 1.24 m/s  70-79: M - 1.26 m/s   F - 1.13 m/s  80-89: M - 0.97 m/s   F - 0.94 m/s   FGA  MCID: 4 pts  Geriatrics/community < 22/30 fall risk  Geriatrics/community < 20/30 unexplained falls DGI  MDC: vestibular - 4 pts  MDC: geriatric/community - 3 pts  Falls risk <19/24   6 Minute Walk Test  Age Norms  57-79: M - 1876 ft (571.80 m)  F - 1765 ft (537.98 m)  70-79: M - 1729 ft (527.00 m)  F - 1545 ft (470.92 m)  80-89: M - 1368 ft (416.97 m)  F - 1286 ft (391.97 m) mCTSIB  Norm: 20-60 yrs  Eyes open firm: norm sway 0.21-0.48  Eyes closed firm: norm sway 0. 48-0.99  Eyes open foam: norm sway 0.38-0.71  Eyes closed foam: norm sway 0.70-2.22       Outcome Measures Initial Eval  7/3/2023        5xSTS 17.67 sec        TUG 9.75 sec B/L UE push off        10 meter TBA ft/sec  m/s        VALENTE NT/56        FGA 14/30        mCTSIB  - FTEO (firm)  - FTEC (firm)    - FTEO (foam)  - FTEC (foam)   60 sec erect  15 sec mod sway   TBA sec  TBA sec        6MWT TBA meters in  min        ABC 44%         DHI NT               Precautions:   Past Medical History:   Diagnosis Date   • CAD (coronary artery disease)    • Cancer (H[facility])     prostate   • CHF (congestive heart failure) ([address])    • Diabetes mellitus ([address])    • Myocardial infarction ([address])      SOC: 7/3/2023  FOTO: NA  POC Expiration: 9/25/2023  Daily Treatment Log:  Date 7/3/2023 7/10/2023      Visit # 1 2      Auth         Manual                        There Exer 8' 15'      LAQ  3" x10 R/L       Bridges  1x10       HR/TR   x20 ea. Std hip ext   1x10 R/L       Std hip abd  1x10 R/L       Supine hip flexor stretch  20" x3  20" x3       Seated hamstring stretch   10" x5 R/L       Seated lumbar flexion  1x10  1x15       palloff press                 HEP Created ad discussed        There Activ                                                        NMReed  25'      FTEO foam   Stance on foam 30" x2       FTEC firm   EO on firm w/ HT horiz and vert 30" x2 ea. Tandem walking   2 laps at [facility] uni UE support       Monster walks   2 laps at window sill no UE       Side stepping         STS  2x5 standard chair UE on knees       Fwd/retro amb         Amb with head turns         Walking marches         Posture over correct                 Modalities                                HEP:   Access Code: [email or web address]  URL: [email or web address]  Date: 07/03/2023  Prepared by: Mariana Patel    Exercises  - Modified Alexandre Stretch  - 1 x daily - 7 x weekly - 3 reps - 20 sec  hold  - Seated Lumbar Flexion Stretch  - 2-3 x daily - 7 x weekly - 1 sets - 10 reps  - Narrow Stance with Head Rotations and Counter Support  - 1 x daily - 7 x weekly - 2 reps - 20-30 hold  - Tandem Walking with Counter Support  - 1 x daily - 7 x weekly - 1 sets - 10 reps

## 2023-07-11 ENCOUNTER — TELEPHONE (OUTPATIENT)
Dept: CARDIOLOGY CLINIC | Facility: CLINIC | Age: 78
End: 2023-07-11

## 2023-07-11 ENCOUNTER — OFFICE VISIT (OUTPATIENT)
Dept: PODIATRY | Facility: CLINIC | Age: 78
End: 2023-07-11
Payer: COMMERCIAL

## 2023-07-11 VITALS
RESPIRATION RATE: 16 BRPM | BODY MASS INDEX: 30.54 KG/M2 | HEIGHT: 74 IN | WEIGHT: 238 LBS | DIASTOLIC BLOOD PRESSURE: 60 MMHG | SYSTOLIC BLOOD PRESSURE: 120 MMHG

## 2023-07-11 DIAGNOSIS — L97.411 DIABETIC ULCER OF RIGHT MIDFOOT ASSOCIATED WITH TYPE 2 DIABETES MELLITUS, LIMITED TO BREAKDOWN OF SKIN (HCC): ICD-10-CM

## 2023-07-11 DIAGNOSIS — M21.41 ACQUIRED FLAT FOOT, RIGHT: ICD-10-CM

## 2023-07-11 DIAGNOSIS — I20.8 STABLE ANGINA PECTORIS (HCC): ICD-10-CM

## 2023-07-11 DIAGNOSIS — M21.962 ACQUIRED DEFORMITY OF LEFT FOOT: ICD-10-CM

## 2023-07-11 DIAGNOSIS — I49.3 FREQUENT PVCS: ICD-10-CM

## 2023-07-11 DIAGNOSIS — M21.961 ACQUIRED DEFORMITY OF RIGHT FOOT: Primary | ICD-10-CM

## 2023-07-11 DIAGNOSIS — M21.42 ACQUIRED FLAT FOOT, LEFT: ICD-10-CM

## 2023-07-11 DIAGNOSIS — E78.2 MIXED HYPERLIPIDEMIA: ICD-10-CM

## 2023-07-11 DIAGNOSIS — I25.10 CORONARY ARTERY DISEASE INVOLVING NATIVE CORONARY ARTERY OF NATIVE HEART WITHOUT ANGINA PECTORIS: ICD-10-CM

## 2023-07-11 DIAGNOSIS — E11.42 DIABETIC POLYNEUROPATHY ASSOCIATED WITH TYPE 2 DIABETES MELLITUS (HCC): ICD-10-CM

## 2023-07-11 DIAGNOSIS — I50.9 CHF (CONGESTIVE HEART FAILURE) (HCC): ICD-10-CM

## 2023-07-11 DIAGNOSIS — E11.621 DIABETIC ULCER OF RIGHT MIDFOOT ASSOCIATED WITH TYPE 2 DIABETES MELLITUS, LIMITED TO BREAKDOWN OF SKIN (HCC): ICD-10-CM

## 2023-07-11 PROCEDURE — L3000 FT INSERT UCB BERKELEY SHELL: HCPCS | Performed by: PODIATRIST

## 2023-07-11 PROCEDURE — 99213 OFFICE O/P EST LOW 20 MIN: CPT | Performed by: PODIATRIST

## 2023-07-11 RX ORDER — METOPROLOL TARTRATE 50 MG/1
50 TABLET, FILM COATED ORAL EVERY 12 HOURS SCHEDULED
Qty: 180 TABLET | Refills: 3 | Status: SHIPPED | OUTPATIENT
Start: 2023-07-11

## 2023-07-11 RX ORDER — ISOSORBIDE MONONITRATE 60 MG/1
60 TABLET, EXTENDED RELEASE ORAL DAILY
Qty: 90 TABLET | Refills: 3 | Status: SHIPPED | OUTPATIENT
Start: 2023-07-11

## 2023-07-11 RX ORDER — TORSEMIDE 20 MG/1
20 TABLET ORAL DAILY
Qty: 90 TABLET | Refills: 3 | Status: SHIPPED | OUTPATIENT
Start: 2023-07-11

## 2023-07-11 RX ORDER — FENOFIBRATE 160 MG/1
160 TABLET ORAL DAILY
Qty: 90 TABLET | Refills: 3 | Status: SHIPPED | OUTPATIENT
Start: 2023-07-11

## 2023-07-11 RX ORDER — ATORVASTATIN CALCIUM 40 MG/1
40 TABLET, FILM COATED ORAL DAILY
Qty: 90 TABLET | Refills: 3 | Status: SHIPPED | OUTPATIENT
Start: 2023-07-11

## 2023-07-11 NOTE — PROGRESS NOTES
Assessment/Plan: Diabetic Sumner grade 0 ulcer plantar aspect right foot. Diabetic neuropathy. Mild peripheral artery disease. Pain upon ambulation. Cellulitis right foot. Mycosis of nail. Dermatophytosis.     Plan. Chart reviewed. Patient examined. Patient advised on condition. Culture and sensitivity of foot done. Patient be placed on Bactrim. Today gentian violet dry sterile dressing applied. Patient will bandage with Iodosorb. In addition we will add Loprox cream.    In addition patient would benefit from offloading insoles. Patient's feet of been molded for insoles. This will offload deformity. This will ease pain.            Diagnoses and all orders for this visit:     Diabetic ulcer of right midfoot associated with type 2 diabetes mellitus, limited to breakdown of skin (HCC)     Diabetic polyneuropathy associated with type 2 diabetes mellitus (HCC)     Peripheral arteriosclerosis (HCC)     Onychomycosis  -     ciclopirox (LOPROX) 0.77 % cream; Apply topically 2 (two) times a day for 20 days     Dermatophytosis  -     ciclopirox (LOPROX) 0.77 % cream; Apply topically 2 (two) times a day for 20 days       Acquired deformity foot bilateral.       Subjective: Urgent visit. Patient has increasing pain in the ball of his right foot. No history of trauma.   Patient is diabetic.          Allergies   Allergen Reactions   • Lisinopril Rash and Lip Swelling            Current Outpatient Medications:   •  ciclopirox (LOPROX) 0.77 % cream, Apply topically 2 (two) times a day for 20 days, Disp: 90 g, Rfl: 2  •  sulfamethoxazole-trimethoprim (BACTRIM DS) 800-160 mg per tablet, Take 1 tablet by mouth every 12 (twelve) hours for 10 days, Disp: 20 tablet, Rfl: 0  •  amLODIPine (NORVASC) 10 mg tablet, TAKE 1 TABLET BY MOUTH  DAILY, Disp: 90 tablet, Rfl: 3  •  aspirin 81 mg chewable tablet, Chew 81 mg daily, Disp: , Rfl:   •  atorvastatin (LIPITOR) 40 mg tablet, TAKE 1 TABLET BY MOUTH  DAILY, Disp: 90 tablet, Rfl: 3  •  Blood Glucose Monitoring Suppl (OneTouch Verio Reflect) w/Device KIT, Check blood sugars twice daily. Please substitute with appropriate alternative as covered by patient's insurance. Dx: E11.65, Disp: 1 kit, Rfl: 0  •  cadexomer iodine (IODOSORB) 0.9 % gel, Apply 1 application topically daily as needed for wound care, Disp: 10 g, Rfl: 0  •  cloNIDine (Catapres) 0.1 mg tablet, Take 1 tablet (0.1 mg total) by mouth every 12 (twelve) hours (Patient taking differently: Take 0.1 mg by mouth every 12 (twelve) hours), Disp: 180 tablet, Rfl: 1  •  clopidogrel (PLAVIX) 75 mg tablet, Take 1 tablet (75 mg total) by mouth daily, Disp: 90 tablet, Rfl: 1  •  fenofibrate 160 MG tablet, TAKE 1 TABLET BY MOUTH  DAILY, Disp: 90 tablet, Rfl: 3  •  gabapentin (NEURONTIN) 600 MG tablet, TAKE 1 TABLET BY MOUTH  TWICE DAILY, Disp: 180 tablet, Rfl: 3  •  glimepiride (AMARYL) 2 mg tablet, Take 1 tablet (2 mg total) by mouth 2 (two) times a day, Disp: 180 tablet, Rfl: 1  •  glucose blood (OneTouch Verio) test strip, Check blood sugars twice daily. Please substitute with appropriate alternative as covered by patient's insurance.  Dx: E11.65, Disp: 200 each, Rfl: 3  •  hydrochlorothiazide (HYDRODIURIL) 12.5 mg tablet, TAKE 1 TABLET BY MOUTH DAILY, Disp: 30 tablet, Rfl: 11  •  Insulin Glargine Solostar (Lantus SoloStar) 100 UNIT/ML SOPN, Inject 0.22 mL (22 Units total) under the skin every evening, Disp: 15 mL, Rfl: 3  •  Insulin Pen Needle 32G X 4 MM MISC, Use every evening, Disp: 100 each, Rfl: 5  •  isosorbide mononitrate (IMDUR) 60 mg 24 hr tablet, Take 1 tablet (60 mg total) by mouth daily, Disp: 90 tablet, Rfl: 2  •  Jardiance 25 MG TABS, TAKE 1 TABLET BY MOUTH IN  THE MORNING, Disp: 90 tablet, Rfl: 3  •  losartan (COZAAR) 100 MG tablet, TAKE 1 TABLET BY MOUTH  DAILY, Disp: 90 tablet, Rfl: 3  •  metFORMIN (GLUCOPHAGE) 500 mg tablet, TAKE 1 TABLET BY MOUTH  TWICE DAILY WITH MEALS, Disp: 180 tablet, Rfl: 3  •  metoprolol succinate (TOPROL-XL) 50 mg 24 hr tablet, TAKE 1 TABLET BY MOUTH  DAILY, Disp: 90 tablet, Rfl: 3  •  Multiple Vitamins-Minerals (MULTIVITAMIN MEN 50+ PO), Take by mouth daily, Disp: , Rfl:   •  nitroglycerin (NITROSTAT) 0.4 mg SL tablet, Place 1 tablet (0.4 mg total) under the tongue every 5 (five) minutes as needed for chest pain, Disp: 30 tablet, Rfl: 3  •  Omega-3 Fatty Acids (fish oil) 1,000 mg, Take 4,000 mg by mouth daily , Disp: , Rfl:   •  OneTouch Delica Lancets 01Y MISC, Check blood sugars twice daily. Please substitute with appropriate alternative as covered by patient's insurance.  Dx: E11.65, Disp: 200 each, Rfl: 3  •  ranolazine (RANEXA) 500 mg 12 hr tablet, Take 1 tablet (500 mg total) by mouth 2 (two) times a day, Disp: 60 tablet, Rfl: 3  •  sitaGLIPtin (JANUVIA) 100 mg tablet, Take 100 mg by mouth daily, Disp: , Rfl:          Patient Active Problem List   Diagnosis   • Mixed hyperlipidemia   • Benign hypertension with CKD (chronic kidney disease) stage III ([facility])   • Coronary artery disease involving native coronary artery of native heart without angina pectoris   • S/P angioplasty with stent   • S/P CABG x 3   • S/P AAA repair   • S/P prostatectomy   • H/O prostate cancer   • Type 2 diabetes mellitus with diabetic polyneuropathy, without long-term current use of insulin ([facility])   • Varicose veins of left lower extremity   • History of endovascular stent graft for abdominal aortic aneurysm (AAA)   • Bruit (arterial)   • Peripheral artery disease (HCC)   • Type 2 diabetes mellitus with diabetic peripheral angiopathy without gangrene, without long-term current use of insulin ([facility])   • Actinic keratoses   • Stage 3b chronic kidney disease ([facility])   • Platelets decreased ([address])   • Gross hematuria   • Chronic systolic heart failure ([facility])   • Mild aortic stenosis   • Chronic kidney disease-mineral and bone disorder   • Stable angina pectoris ([address])   • Hypertensive urgency   • Elevated troponin level not due myocardial infarction             Patient ID: Goran Mccoy is a 78 y.o. male.     HPI     The following portions of the patient's history were reviewed and updated as appropriate:      family history includes Alcohol abuse in his father; Diabetes in his mother.       reports that he quit smoking about 35 years ago. His smoking use included cigarettes. He started smoking about 67 years ago. He has a 49.50 pack-year smoking history. He has never used smokeless tobacco. He reports current alcohol use of about 14.0 standard drinks of alcohol per week. He reports that he does not use drugs.        Objective:  Patient's shoes and socks removed. Foot ExamPhysical Exam             General  General Appearance: appears stated age and healthy   Orientation: alert and oriented to person, place, and time   Affect: appropriate   Gait: antalgic         Right Foot/Ankle      Inspection and Palpation  Tenderness: metatarsals   Swelling: none   Arch: pes planus  Claw Toes: fifth toe  Hallux limitus: yes  Skin Exam: callus, dry skin and tinea;      Neurovascular  Dorsalis pedis: 1+  Posterior tibial: 1+  Saphenous nerve sensation: absent  Tibial nerve sensation: absent  Superficial peroneal nerve sensation: absent  Deep peroneal nerve sensation: absent  Sural nerve sensation: absent        Left Foot/Ankle       Inspection and Palpation  Tenderness: metatarsals   Swelling: none   Arch: pes planus  Claw toes: second toe and fifth toe  Hallux limitus: yes  Skin Exam: callus, dry skin and tinea;      Neurovascular  Dorsalis pedis: 1+  Posterior tibial: 1+  Saphenous nerve sensation: absent  Tibial nerve sensation: absent  Superficial peroneal nerve sensation: absent  Deep peroneal nerve sensation: absent  Sural nerve sensation: absent           Physical Exam  Vitals signs and nursing note reviewed.    Cardiovascular:      Rate and Rhythm: Normal rate and regular rhythm.      Pulses:           Dorsalis pedis pulses are 1+ on the right side and 1+ on the left side.        Posterior tibial pulses are 1+ on the right side and 1+ on the left side. Feet:      Right foot:      Skin integrity: Callus and dry skin present.      Left foot:      Skin integrity: Callus and dry skin present. Skin:     Capillary Refill: Capillary refill takes 2 to 3 seconds.      Comments:   All nails are thick and mycotic.  Patient demonstrates bilateral dermatophytosis. [place] is a pre ulcerative/ corn on the plantar aspect 2nd left toe.    Right Foot/Ankle   Right Foot Inspection  Skin Exam: dry skin, callus and callus                                 Vascular     The right DP pulse is 1+. The right PT pulse is 1+. Right Toe  - Comprehensive Exam  Arch: pes planus  Claw Toes: fifth toe  Hallux limitus: yes  Swelling: none   Tenderness: metatarsals            Left Foot/Ankle  Left Foot Inspection  Skin Exam: dry skin and callus                                             Vascular     The left DP pulse is 1+. The left PT pulse is 1+. Left Toe  - Comprehensive Exam  Arch: pes planus  Claw toes: second toe and fifth toe  Hallux limitus: yes  Swelling: none   Tenderness: metatarsals      Patient's shoes and socks removed. Assign Risk Category:  Deformity present; Loss of protective sensation; Weak pulses       Risk: 2        Patient has profound dermatophytosis and xerosis of the foot bilateral.  There is a fissure on the plantar lateral aspect of the right foot. Secondary cellulitis. It is a Sumner grade 0 type ulcer. Negative pus.   Negative sinus or undermining.

## 2023-07-11 NOTE — TELEPHONE ENCOUNTER
Patient left message on rx line with concerns regarding recent meds. He states this is all wrong.  I called patient back - lm to clarify

## 2023-07-12 ENCOUNTER — APPOINTMENT (OUTPATIENT)
Dept: PHYSICAL THERAPY | Facility: CLINIC | Age: 78
End: 2023-07-12
Payer: COMMERCIAL

## 2023-07-13 ENCOUNTER — OFFICE VISIT (OUTPATIENT)
Dept: PHYSICAL THERAPY | Facility: CLINIC | Age: 78
End: 2023-07-13
Payer: COMMERCIAL

## 2023-07-13 ENCOUNTER — TELEPHONE (OUTPATIENT)
Dept: INPATIENT UNIT | Facility: HOSPITAL | Age: 78
End: 2023-07-13

## 2023-07-13 DIAGNOSIS — R26.9 NEUROLOGIC GAIT DYSFUNCTION: Primary | ICD-10-CM

## 2023-07-13 DIAGNOSIS — R26.89 IMBALANCE: ICD-10-CM

## 2023-07-13 PROCEDURE — 97112 NEUROMUSCULAR REEDUCATION: CPT

## 2023-07-13 PROCEDURE — 97110 THERAPEUTIC EXERCISES: CPT

## 2023-07-13 NOTE — TELEPHONE ENCOUNTER
CHF Evaluation/Screening Form    Patient Level Data  Encounter Level Data  Knowledge Assessment/Post-Discharge Questions  Following Diet: Foods to Limit/Avoid Salt: Yes  Daily Weights Done?: Yes  Date of last weight: 7/13/23  Weight recorded: 231  Knows name of medication/water pill?: Yes  Understands Activity/Exercise:  Yes  Knows when to report symptoms?: Yes  Plan in place to call for worsening symptoms?: Yes  Does the patient have a means of transporation?: Yes

## 2023-07-13 NOTE — PROGRESS NOTES
Daily Note     Today's date: 2023  Patient name: Donovan Krause  : 1945  MRN: [id number]  Referring provider: Geno Eisenmenger, DO  Dx:   Encounter Diagnosis     ICD-10-CM    1. 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 Imbalance  R26.89                      Subjective: No falls since last in clinic      Objective: See treatment diary below      Assessment: Tolerated treatment well. Patient would benefit from continued PT in order to continue working on balance. Did well with backward tandem ambulation and the addition of SLS on foam with eyes open. Continue to progress per primary physical therapist plan. Plan: Continue per plan of care. Cut off scores: All data taken from [facility] Section or Rehab Measures    Lennon: <46/56=falls risk  MDC: 6 pts  Age Norms:  57-79: M - 54   F - 55  70-79: M - 47   F - 53  80-89: M - 48   F - 50 5xSTS: Gail et al 2010  MDC: 2.3 sec  Age Norms:  60-69: 11.1 sec  70-79: 12.6 sec  80-89: 14.8 sec   TUG  MDC: 4.14 sec  Cut off score:  >13.5 sec community dwelling adults  >32.2 sec frail elderly  <20 sec:  I for basic transfers  >30: dependent for transfers 10 Meter Walk Test Norms: [place] and Ezra gutierrez 2011  20-29: M - 1.35 m/s   F - 1.34 m/s  30-39: M - 1.43 m/s   F - 1.34 m/s  40-49: M - 1.43 m/s   F - 1.39 m/s  50-59: M - 1.43 m/s   F - 1.31 m/s  60-69: M - 1.34 m/s   F - 1.24 m/s  70-79: M - 1.26 m/s   F - 1.13 m/s  80-89: M - 0.97 m/s   F - 0.94 m/s   FGA  MCID: 4 pts  Geriatrics/community < 22/30 fall risk  Geriatrics/community < 20/30 unexplained falls DGI  MDC: vestibular - 4 pts  MDC: geriatric/community - 3 pts  Falls risk <19/24   6 Minute Walk Test  Age Norms  57-79: M - 1876 ft (571.80 m)  F - 1765 ft (537.98 m)  70-79: M - 1729 ft (527.00 m)  F - 1545 ft (470.92 m)  80-89: M - 1368 ft (416.97 m)  F - 1286 ft (391.97 m) mCTSIB  Norm: 20-60 yrs  Eyes open firm: norm sway 0.21-0.48  Eyes closed firm: norm sway 0.48-0.99  Eyes open foam: norm sway 0.38-0.71  Eyes closed foam: norm sway 0.70-2.22       Outcome Measures Initial Eval  7/3/2023        5xSTS 17.67 sec        TUG 9.75 sec B/L UE push off        10 meter TBA ft/sec  m/s        VALENTE NT/56        FGA 14/30        mCTSIB  - FTEO (firm)  - FTEC (firm)    - FTEO (foam)  - FTEC (foam)   60 sec erect  15 sec mod sway   TBA sec  TBA sec        6MWT TBA meters in  min        ABC 44%         DHI NT               Precautions:   Past Medical History:   Diagnosis Date   • CAD (coronary artery disease)    • Cancer (HCC)     prostate   • CHF (congestive heart failure) ([address])    • Diabetes mellitus ([address])    • Myocardial infarction ([address])      SOC: 7/3/2023  FOTO: NA  POC Expiration: 9/25/2023  Daily Treatment Log:  Date 7/3/2023 7/10/2023 7/13/23     Visit # 1 2 3     Auth         Manual                        There Exer 8' 15' 15'     LAQ  3" x10 R/L  3" x 20 R/L     Bridges  1x10  2*10     HR/TR   x20 ea. 20x ea     Std hip ext   1x10 R/L  1*10 R/L     Std hip abd  1x10 R/L  1*10 R/L     Supine hip flexor stretch  20" x3  20" x3  20" x3     Seated hamstring stretch   10" x5 R/L  10" x 5 R/L     Seated lumbar flexion  1x10  1x15  1*15     palloff press                 HEP Created ad discussed        There Activ                                                        NMReed  25' 25'     FTEO foam   Stance on foam 30" x2  Stance on foam 60" x 1,  SLS on foam 15" x 4 b/l     FTEC firm   EO on firm w/ HT horiz and vert 30" x2 ea.   EC on foam 15" x 1 60 "     Tandem walking   2 laps at [employer] UE support  2 laps at [employer] UE support , 2 laps tandem walking  Backward     Monster walks   2 laps at window sill no UE  2 laps no UE support close guard        High steppage close guard by window 4 laps     Side stepping         STS  2x5 standard chair UE on knees  2x5 standard chair UE on knees      Fwd/retro amb         Amb with head turns         Walking marches         Posture over correct Modalities                                HEP:   Access Code: [email or web address]  URL: [email or web address]  Date: 07/03/2023  Prepared by: Lubna Waters    Exercises  - Modified Alexandre Stretch  - 1 x daily - 7 x weekly - 3 reps - 20 sec  hold  - Seated Lumbar Flexion Stretch  - 2-3 x daily - 7 x weekly - 1 sets - 10 reps  - Narrow Stance with Head Rotations and Counter Support  - 1 x daily - 7 x weekly - 2 reps - 20-30 hold  - Tandem Walking with Counter Support  - 1 x daily - 7 x weekly - 1 sets - 10 reps

## 2023-07-17 ENCOUNTER — OFFICE VISIT (OUTPATIENT)
Dept: PHYSICAL THERAPY | Facility: CLINIC | Age: 78
End: 2023-07-17
Payer: COMMERCIAL

## 2023-07-17 DIAGNOSIS — R26.9 NEUROLOGIC GAIT DYSFUNCTION: Primary | ICD-10-CM

## 2023-07-17 DIAGNOSIS — R26.89 IMBALANCE: ICD-10-CM

## 2023-07-17 PROCEDURE — 97110 THERAPEUTIC EXERCISES: CPT | Performed by: PHYSICAL THERAPIST

## 2023-07-17 PROCEDURE — 97112 NEUROMUSCULAR REEDUCATION: CPT | Performed by: PHYSICAL THERAPIST
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
## 2023-07-19 ENCOUNTER — OFFICE VISIT (OUTPATIENT)
Dept: PHYSICAL THERAPY | Facility: CLINIC | Age: 78
End: 2023-07-19
Payer: COMMERCIAL

## 2023-07-19 DIAGNOSIS — R26.9 NEUROLOGIC GAIT DYSFUNCTION: Primary | ICD-10-CM

## 2023-07-19 DIAGNOSIS — R26.89 IMBALANCE: ICD-10-CM

## 2023-07-19 PROCEDURE — 97112 NEUROMUSCULAR REEDUCATION: CPT

## 2023-07-19 NOTE — PROGRESS NOTES
Daily Note     Today's date: 2023  Patient name: Chanda Black  : 1945  MRN: [id number]  Referring provider: Néstor Prather DO  Dx:   Encounter Diagnosis     ICD-10-CM    1. 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 Imbalance  R26.89                      Subjective: Pt reports he is doing ok today       Objective: See treatment diary below      Assessment: Tolerated treatment well with no increase in low back pain and improvement noted in tandem walking near rail. Patient demonstrated fatigue post treatment and would benefit from continued PT to further decrease hip flexor tightness to improve posture with ambulation and improve balance. Plan: Continue per plan of care. Cut off scores: All data taken from APTA Neuro Section or Rehab Measures    Lennon: <46/56=falls risk  MDC: 6 pts  Age Norms:  57-79: M - 54   F - 55  70-79: M - 47   F - 53  80-89: M - 48   F - 50 5xSTS: Gail et al 2010  MDC: 2.3 sec  Age Norms:  60-69: 11.1 sec  70-79: 12.6 sec  80-89: 14.8 sec   TUG  MDC: 4.14 sec  Cut off score:  >13.5 sec community dwelling adults  >32.2 sec frail elderly  <20 sec:  I for basic transfers  >30: dependent for transfers 10 Meter Walk Test Norms: Amina Wilder and Jesse gutierrez 2011  20-29: M - 1.35 m/s   F - 1.34 m/s  30-39: M - 1.43 m/s   F - 1.34 m/s  40-49: M - 1.43 m/s   F - 1.39 m/s  50-59: M - 1.43 m/s   F - 1.31 m/s  60-69: M - 1.34 m/s   F - 1.24 m/s  70-79: M - 1.26 m/s   F - 1.13 m/s  80-89: M - 0.97 m/s   F - 0.94 m/s   FGA  MCID: 4 pts  Geriatrics/community < 22/30 fall risk  Geriatrics/community < 20/30 unexplained falls DGI  MDC: vestibular - 4 pts  MDC: geriatric/community - 3 pts  Falls risk <19/24   6 Minute Walk Test  Age Norms  57-79: M - 1876 ft (571.80 m)  F - 1765 ft (537.98 m)  70-79: M - 1729 ft (527.00 m)  F - 1545 ft (470.92 m)  80-89: M - 1368 ft (416.97 m)  F - 1286 ft (391.97 m) mCTSIB  Norm: 20-60 yrs  Eyes open firm: norm sway 0.21-0.48  Eyes closed firm: norm sway 0. 48-0.99  Eyes open foam: norm sway 0.38-0.71  Eyes closed foam: norm sway 0.70-2.22       Outcome Measures Initial Eval  7/3/2023        5xSTS 17.67 sec        TUG 9.75 sec B/L UE push off        10 meter TBA ft/sec  m/s        VALENTE NT/56        FGA 14/30        mCTSIB  - FTEO (firm)  - FTEC (firm)    - FTEO (foam)  - FTEC (foam)   60 sec erect  15 sec mod sway   TBA sec  TBA sec        6MWT TBA meters in  min        ABC 44%         DHI NT               Precautions:   Past Medical History:   Diagnosis Date   • CAD (coronary artery disease)    • Cancer (HCC)     prostate   • CHF (congestive heart failure) ([address])    • Diabetes mellitus ([address])    • Myocardial infarction ([address])      SOC: 7/3/2023  FOTO: NA  POC Expiration: 9/25/2023  Daily Treatment Log:  Date 7/3/2023 7/10/2023 7/13/23 7/17/2023 7/19/2023   Visit # 1 2 3 4 5   Auth         Manual                        There Exer 8' 15' 15' 15'    LAQ  3" x10 R/L  3" x 20 R/L  3" 2x10 R/L    Bridges  1x10  2*10 2x10 2x10    LTR    10"x5 R/L 5" x10 R/L    HR/TR   x20 ea. 20x ea B/L HR 1x10    Std hip ext   1x10 R/L  1*10 R/L YTB @ ankles 1x10 ea CGA (NMReed) YTB @ ankles 1x10 ea CS   Std hip abd  1x10 R/L  1*10 R/L YTB @ ankles 1x10 ea CGA (NMReed) YTB @ ankles 1x10 ea CS   Supine hip flexor stretch  20" x3  20" x3  20" x3 20"x3 R/L 20"x3 R/L   Seated hamstring stretch   10" x5 R/L  10" x 5 R/L     Seated lumbar flexion  1x10  1x15  1*15 1x10    palloff press         Hip flexor stretch stand foot on step    5"x10 R/L 10"x10 R/L   HIWOT vs counter    1x10 1x10   recumb bike    L2x5'    HEP Created ad discussed        There Activ                                                        NMReed  25' 25' 30'    FTEO foam   Stance on foam 30" x2  Stance on foam 60" x 1,  SLS on foam 15" x 4 b/l     FTEC firm   EO on firm w/ HT horiz and vert 30" x2 ea.   EC on foam 15" x 1 60 " EC on foam  60"x1 CGA EC on firm posterior LOB able to recover CGA 15" x2    Tandem walking   2 laps at [employer] UE support  2 laps at [employer] UE support , 2 laps tandem walking  Backward 2 laps CGA near rail 10'x4 2 laps CGA near rail 10'x4   Monster walks   2 laps at window sill no UE  2 laps no UE support close guard        High steppage close guard by window 4 laps     Side stepping     YTB @ ankles 10'x1 R/L CGA near rail YTB @ ankles 10'x1 R/L CGA near rail   STS  2x5 standard chair UE on knees  2x5 standard chair UE on knees  Feet on foam; from low mat Feet on foam; from low mat 1x10    Fwd/retro amb         Amb with head turns         Walking marches     Alt cone taps 1x10 R/L CGA    Posture over correct                 Modalities                                HEP:   Access Code: [email or web address]  URL: [email or web address]  Date: 07/03/2023  Prepared by: Julius Richards    Exercises  - Modified Alexandre Stretch  - 1 x daily - 7 x weekly - 3 reps - 20 sec  hold  - Seated Lumbar Flexion Stretch  - 2-3 x daily - 7 x weekly - 1 sets - 10 reps  - Narrow Stance with Head Rotations and Counter Support  - 1 x daily - 7 x weekly - 2 reps - 20-30 hold  - Tandem Walking with Counter Support  - 1 x daily - 7 x weekly - 1 sets - 10 reps

## 2023-07-24 ENCOUNTER — OFFICE VISIT (OUTPATIENT)
Dept: PHYSICAL THERAPY | Facility: CLINIC | Age: 78
End: 2023-07-24
Payer: COMMERCIAL

## 2023-07-24 DIAGNOSIS — R26.89 IMBALANCE: ICD-10-CM

## 2023-07-24 DIAGNOSIS — R26.9 NEUROLOGIC GAIT DYSFUNCTION: Primary | ICD-10-CM

## 2023-07-24 PROCEDURE — 97110 THERAPEUTIC EXERCISES: CPT

## 2023-07-24 PROCEDURE — 97112 NEUROMUSCULAR REEDUCATION: CPT

## 2023-07-24 NOTE — PROGRESS NOTES
Daily Note     Today's date: 2023  Patient name: Army Richter  : 1945  MRN: [id number]  Referring provider: Kay Jones DO  Dx:   Encounter Diagnosis     ICD-10-CM    1. 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 Imbalance  R26.89                      Subjective: Pt reports he is doing good and has been practicing his tandem walking at home. Patient stated he feels more comfortable walking and believes this is due to his balance improving. Objective: See treatment diary below      Assessment: Tolerated treatment well with improving balance with dynamic balance exercise. Discussed need to scan environment to be more aware of foot placement as patient was not providing enough space for second foot with [employer]SU. Patient verbalized understanding and stated since the neuropathy has gotten worse he find it is hard to have an idea of where his feet are which impacts his balance. Patient demonstrated fatigue post treatment and would benefit from continued PT to further decrease hip flexor tightness to improve posture with ambulation and improve balance. Plan: Continue per plan of care. Cut off scores: All data taken from APTA Neuro Section or Rehab Measures    Lennon: <46/56=falls risk  MDC: 6 pts  Age Norms:  57-79: M - 54   F - 55  70-79: M - 47   F - 53  80-89: M - 48   F - 50 5xSTS: Gail et al 2010  MDC: 2.3 sec  Age Norms:  60-69: 11.1 sec  70-79: 12.6 sec  80-89: 14.8 sec   TUG  MDC: 4.14 sec  Cut off score:  >13.5 sec community dwelling adults  >32.2 sec frail elderly  <20 sec:  I for basic transfers  >30: dependent for transfers 10 Meter Walk Test Norms: Des Espinoza and Yosef gutierrez 2011  20-29: M - 1.35 m/s   F - 1.34 m/s  30-39: M - 1.43 m/s   F - 1.34 m/s  40-49: M - 1.43 m/s   F - 1.39 m/s  50-59: M - 1.43 m/s   F - 1.31 m/s  60-69: M - 1.34 m/s   F - 1.24 m/s  70-79: M - 1.26 m/s   F - 1.13 m/s  80-89: M - 0.97 m/s   F - 0.94 m/s   FGA  MCID: 4 pts  Geriatrics/community < 22/30 fall risk  Geriatrics/community < 20/30 unexplained falls DGI  MDC: vestibular - 4 pts  MDC: geriatric/community - 3 pts  Falls risk <19/24   6 Minute Walk Test  Age Norms  57-79: M - 1876 ft (571.80 m)  F - 1765 ft (537.98 m)  70-79: M - 1729 ft (527.00 m)  F - 1545 ft (470.92 m)  80-89: M - 1368 ft (416.97 m)  F - 1286 ft (391.97 m) mCTSIB  Norm: 20-60 yrs  Eyes open firm: norm sway 0.21-0.48  Eyes closed firm: norm sway 0.48-0.99  Eyes open foam: norm sway 0.38-0.71  Eyes closed foam: norm sway 0.70-2.22       Outcome Measures Initial Eval  7/3/2023        5xSTS 17.67 sec        TUG 9.75 sec B/L UE push off        10 meter TBA ft/sec  m/s        VALENTE NT/56        FGA 14/30        mCTSIB  - FTEO (firm)  - FTEC (firm)    - FTEO (foam)  - FTEC (foam)   60 sec erect  15 sec mod sway   TBA sec  TBA sec        6MWT TBA meters in  min        ABC 44%         DHI NT               Precautions:   Past Medical History:   Diagnosis Date   • CAD (coronary artery disease)    • Cancer ([address])     prostate   • CHF (congestive heart failure) ([address])    • Diabetes mellitus ([address])    • Myocardial infarction ([address])      [place]: 7/3/2023  FOTO: NA  POC Expiration: 9/25/2023  Daily Treatment Log:  Date 7/24/2023 7/17/2023 7/19/2023   Visit # 6    4 5   Auth         Manual                        There Exer 15'   15'    LAQ 3" 2x10 R/L     3" 2x10 R/L    Bridges 2x10   2x10 2x10    LTR 5" x10 R/L    10"x5 R/L 5" x10 R/L    HR/TR  B/L HR 2x10   B/L HR 1x10    Std hip ext  YTB @ ankles 1x10 ea CS   YTB @ ankles 1x10 ea CGA (NMReed) YTB @ ankles 1x10 ea CS   Std hip abd    YTB @ ankles 1x10 ea CGA (NMReed) YTB @ ankles 1x10 ea CS   Supine hip flexor stretch  20" x3 R/L    20"x3 R/L 20"x3 R/L   Seated lumbar flexion     1x10    palloff press         Hip flexor stretch stand foot on step 10" x5 R/L    5"x10 R/L 10"x10 R/L   HIWOT vs counter 1x10   1x10 1x10   recumb bike L2x5'   L2x5'    SLR flex  1x10 R/L                        HEP        There Activ NMReed 25'   30'    FTEO foam  Stance on foam mod sway 30" x2 CS       FTEC firm  EC on firm mod sway 30" x2 CS   EC on foam  60"x1 CGA EC on firm posterior LOB able to recover CGA 15" x2    Tandem walking  10' x2 open space CS/CGA    2 laps CGA near rail 10'x4 2 laps CGA near rail 10'x4   Monster walks         Side stepping  YTB @ ankles 10'x1 R/L CS near rail   YTB @ ankles 10'x1 R/L CGA near rail YTB @ ankles 10'x1 R/L CGA near rail   STS    Feet on foam; from low mat Feet on foam; from low mat 1x10    Fwd/retro amb         Amb with head turns         Walking marches     Alt cone taps 1x10 R/L CGA    FSU 1x10 R/L        LSU  1x10 R/L        Modalities                                HEP:   Access Code: [email or web address]  URL: [email or web address]  Date: 07/03/2023  Prepared by: Tiffanie Harvey    Exercises  - Modified Alexandre Stretch  - 1 x daily - 7 x weekly - 3 reps - 20 sec  hold  - Seated Lumbar Flexion Stretch  - 2-3 x daily - 7 x weekly - 1 sets - 10 reps  - Narrow Stance with Head Rotations and Counter Support  - 1 x daily - 7 x weekly - 2 reps - 20-30 hold  - Tandem Walking with Counter Support  - 1 x daily - 7 x weekly - 1 sets - 10 reps

## 2023-07-26 ENCOUNTER — OFFICE VISIT (OUTPATIENT)
Dept: PHYSICAL THERAPY | Facility: CLINIC | Age: 78
End: 2023-07-26
Payer: COMMERCIAL

## 2023-07-26 ENCOUNTER — OFFICE VISIT (OUTPATIENT)
Dept: NEPHROLOGY | Facility: CLINIC | Age: 78
End: 2023-07-26
Payer: COMMERCIAL

## 2023-07-26 VITALS
WEIGHT: 238.6 LBS | BODY MASS INDEX: 30.62 KG/M2 | SYSTOLIC BLOOD PRESSURE: 158 MMHG | HEIGHT: 74 IN | HEART RATE: 48 BPM | DIASTOLIC BLOOD PRESSURE: 80 MMHG

## 2023-07-26 DIAGNOSIS — I50.33 ACUTE ON CHRONIC DIASTOLIC HEART FAILURE (HCC): ICD-10-CM

## 2023-07-26 DIAGNOSIS — M89.9 CHRONIC KIDNEY DISEASE-MINERAL AND BONE DISORDER: ICD-10-CM

## 2023-07-26 DIAGNOSIS — I10 ESSENTIAL HYPERTENSION: ICD-10-CM

## 2023-07-26 DIAGNOSIS — E78.2 MIXED HYPERLIPIDEMIA: ICD-10-CM

## 2023-07-26 DIAGNOSIS — E83.9 CHRONIC KIDNEY DISEASE-MINERAL AND BONE DISORDER: ICD-10-CM

## 2023-07-26 DIAGNOSIS — N18.30 BENIGN HYPERTENSION WITH CKD (CHRONIC KIDNEY DISEASE) STAGE III (HCC): ICD-10-CM

## 2023-07-26 DIAGNOSIS — R26.9 NEUROLOGIC GAIT DYSFUNCTION: Primary | ICD-10-CM

## 2023-07-26 DIAGNOSIS — R26.89 IMBALANCE: ICD-10-CM

## 2023-07-26 DIAGNOSIS — I49.3 FREQUENT PVCS: ICD-10-CM

## 2023-07-26 DIAGNOSIS — I12.9 BENIGN HYPERTENSION WITH CKD (CHRONIC KIDNEY DISEASE) STAGE III (HCC): ICD-10-CM

## 2023-07-26 DIAGNOSIS — N18.32 STAGE 3B CHRONIC KIDNEY DISEASE (HCC): Primary | ICD-10-CM

## 2023-07-26 DIAGNOSIS — N18.9 CHRONIC KIDNEY DISEASE-MINERAL AND BONE DISORDER: ICD-10-CM

## 2023-07-26 PROBLEM — N17.9 ACUTE KIDNEY INJURY SUPERIMPOSED ON CHRONIC KIDNEY DISEASE (HCC): Status: RESOLVED | Noted: 2023-06-16 | Resolved: 2023-07-26

## 2023-07-26 PROCEDURE — 97112 NEUROMUSCULAR REEDUCATION: CPT

## 2023-07-26 PROCEDURE — 97110 THERAPEUTIC EXERCISES: CPT

## 2023-07-26 PROCEDURE — 99214 OFFICE O/P EST MOD 30 MIN: CPT | Performed by: INTERNAL MEDICINE

## 2023-07-26 RX ORDER — METOPROLOL TARTRATE 50 MG/1
50 TABLET, FILM COATED ORAL EVERY 12 HOURS SCHEDULED
Qty: 180 TABLET | Refills: 3
Start: 2023-07-26

## 2023-07-26 RX ORDER — CLONIDINE HYDROCHLORIDE 0.1 MG/1
0.1 TABLET ORAL EVERY 12 HOURS
Qty: 180 TABLET | Refills: 1
Start: 2023-07-26

## 2023-07-26 RX ORDER — AMLODIPINE BESYLATE 10 MG/1
5 TABLET ORAL DAILY
Qty: 1 TABLET | Refills: 1
Start: 2023-07-26

## 2023-07-26 NOTE — PROGRESS NOTES
NEPHROLOGY OFFICE PROGRESS NOTE   Cabrera Coelho 78 y.o. male MRN: [id number]  DATE: 07/26/23  Reason for visit: Continued evaluation and management of CKD    ASSESSMENT & PLAN:  1. Chronic kidney disease, stage IIIB:  · Thomas's baseline serum creatinine was 1.3 to 1.6. · Since being discharged from the hospital in June for CHF, his creatinine seems to have leveled off at around 1.6 to 2.0. · The etiology of his CKD is multifactorial - hypertensive nephrosclerosis, diabetic kidney disease and cardiorenal syndrome. · Most recent creatinine is 2.09. · We will recheck labs over the next 2 weeks to establish current renal function. · Treatment: good BP control, glycemic control, volume control. 2. Hypertension:  · BP is above goal (<130/80)  · Note that his home machine reads +10 higher than the office BP cuff. · Current medications: Torsemide 20 mg OD, Metoprolol tartrate 50 mg BID, Clonidine 0.1 mg twice a day, ISMN 60 mg daily. · Amlodipine 10 mg daily and losartan 100 mg daily are on hold since hospital stay. · Restart amlodipine 5 mg daily. 3. Congestive heart failure:  · Clinically improved. · Continue torsemide 20 mg daily. 4. Mineral bone disease:  · PTH is at goal.  20.2 in June 2023. · Calcium and phosphorus are at goal.  · Vitamin D level is at goal.  39.2 in June 2023. · No changes. 5. Proteinuria:  · UPC is at goal - 0.36. · Now off Losartan. 6. Diabetes mellitus:  · Management is deferred to PCP. · He is on Jardiance. 7. Hyperlipidemia:  · On atorvastatin and fenofibrate    Patient Instructions   Your creatinine is higher but this is not unexpected. Please check labs anytime over the next 2 weeks. Restart Amlodipine 5 mg daily (1/2 tablet of 10 mg)  Update with your BP readings in 4 weeks (check after medications in the AM). Follow up in 3-4 months. SUBJECTIVE / INTERVAL HISTORY:  Flower Gregory was last seen in the office in early June 2023.      He was admitted to [facility] between June 16 and June 19, 2023 after presenting with worsening shortness of breath and leg edema. On presentation, his creatinine was noted to be 2.23 and ranged between 1.62 and 2.25 during the hospital stay. His creatinine on discharge was 1.62 and he was discharged on torsemide 20 mg daily. Of note, he was also taking Bactrim prior to admission. Since being discharged from the hospital, he feels good. There has been no recurrence of shortness of breath. He does have some shortness of breath on exertion. No recurrence of leg edema. He had repeat labs on June 26, 2023 which revealed a creatinine of 2.09. He checks his blood pressure at home every day before taking medications and his range has been 150s to 160s over 70s to 80s. He feels back to baseline. Weights have been stable. BP cuff calibration July 26, 2023:  Home BP cuff 169/77  Office BP cuff 158/80    PMH/PSH: HTN, DM, HLP, CAD s/p CABG 2002, s/p PCI, prostate cancer s/p prostatectomy, AAA s/p EVAR, nephrolithiasis, DDD s/p laminectomy, cholecystectomy    Previous work up:   8/2/22 CT abdomen: 1 or more simple renal cysts. No hydronephrosis. Parenchymal thinning/scar at the R inferior pole. 6/17/23 echocardiogram: Mild to moderate LVH, EF 50%, G2DD    ALLERGIES:   Allergies   Allergen Reactions   • Lisinopril Rash and Lip Swelling     REVIEW OF SYSTEMS:  Review of Systems   Constitutional: Negative for appetite change, chills, fatigue and fever. Respiratory: Positive for shortness of breath (on exertion). Negative for cough. Cardiovascular: Negative for chest pain and leg swelling. Gastrointestinal: Negative for abdominal pain, diarrhea, nausea and vomiting. Genitourinary: Negative for hematuria. Musculoskeletal: Negative for arthralgias and back pain. Neurological: Negative for dizziness and light-headedness.      OBJECTIVE:  /80 (BP Location: Left arm, Patient Position: Sitting, Cuff Size: Standard) Pulse (!) 48   Ht 6' 2" (1.88 m)   Wt 108 kg (238 lb 9.6 oz)   BMI 30.63 kg/m²   Current Weight: Weight - Scale: 108 kg (238 lb 9.6 oz) Body mass index is 30.63 kg/m². Physical Exam  Constitutional:       General: He is not in acute distress. Appearance: Normal appearance. He is well-developed. He is not ill-appearing or diaphoretic. HENT:      Head: Normocephalic and atraumatic. Eyes:      General: No scleral icterus. Conjunctiva/sclera: Conjunctivae normal.   Neck:      Vascular: No JVD. Cardiovascular:      Rate and Rhythm: Normal rate and regular rhythm. Heart sounds: Normal heart sounds. Pulmonary:      Effort: Pulmonary effort is normal. No respiratory distress. Breath sounds: Normal breath sounds. Abdominal:      General: Bowel sounds are normal.      Palpations: Abdomen is soft. Musculoskeletal:      Cervical back: Neck supple. Right lower leg: No edema. Left lower leg: No edema. Skin:     General: Skin is warm and dry. Neurological:      Mental Status: He is alert and oriented to person, place, and time.    Psychiatric:         Behavior: Behavior normal.         Judgment: Judgment normal.       Medications:  Current Outpatient Medications:   •  aspirin 81 mg chewable tablet, Chew 81 mg daily, Disp: , Rfl:   •  atorvastatin (LIPITOR) 40 mg tablet, Take 1 tablet (40 mg total) by mouth daily, Disp: 90 tablet, Rfl: 3  •  cloNIDine (CATAPRES) 0.1 mg tablet, take 1 tablet by mouth every 12 hours, Disp: 180 tablet, Rfl: 1  •  clopidogrel (PLAVIX) 75 mg tablet, Take 1 tablet (75 mg total) by mouth daily, Disp: 90 tablet, Rfl: 1  •  fenofibrate 160 MG tablet, Take 1 tablet (160 mg total) by mouth daily, Disp: 90 tablet, Rfl: 3  •  gabapentin (NEURONTIN) 600 MG tablet, TAKE 1 TABLET BY MOUTH  TWICE DAILY, Disp: 180 tablet, Rfl: 3  •  glimepiride (AMARYL) 2 mg tablet, Take 1 tablet (2 mg total) by mouth 2 (two) times a day, Disp: 180 tablet, Rfl: 1  •  Insulin Glargine Solostar (Lantus SoloStar) 100 UNIT/ML SOPN, Inject 0.15 mL (15 Units total) under the skin every evening, Disp: 15 mL, Rfl: 0  •  isosorbide mononitrate (IMDUR) 60 mg 24 hr tablet, Take 1 tablet (60 mg total) by mouth daily, Disp: 90 tablet, Rfl: 3  •  Jardiance 25 MG TABS, TAKE 1 TABLET BY MOUTH IN  THE MORNING, Disp: 90 tablet, Rfl: 3  •  metFORMIN (GLUCOPHAGE) 500 mg tablet, TAKE 1 TABLET BY MOUTH  TWICE DAILY WITH MEALS, Disp: 180 tablet, Rfl: 3  •  metoprolol tartrate (LOPRESSOR) 50 mg tablet, Take 1 tablet (50 mg total) by mouth every 12 (twelve) hours, Disp: 180 tablet, Rfl: 3  •  Multiple Vitamins-Minerals (MULTIVITAMIN MEN 50+ PO), Take by mouth daily, Disp: , Rfl:   •  Omega-3 Fatty Acids (fish oil) 1,000 mg, Take 4,000 mg by mouth daily , Disp: , Rfl:   •  ranolazine (RANEXA) 500 mg 12 hr tablet, take 1 tablet by mouth twice a day, Disp: 60 tablet, Rfl: 3  •  sitaGLIPtin (JANUVIA) 100 mg tablet, Take 100 mg by mouth daily, Disp: , Rfl:   •  torsemide (DEMADEX) 20 mg tablet, Take 1 tablet (20 mg total) by mouth daily, Disp: 90 tablet, Rfl: 3  •  Blood Glucose Monitoring Suppl (OneTouch Verio Reflect) w/Device KIT, Check blood sugars twice daily. Please substitute with appropriate alternative as covered by patient's insurance. Dx: E11.65, Disp: 1 kit, Rfl: 0  •  cadexomer iodine (IODOSORB) 0.9 % gel, Apply 1 application topically daily as needed for wound care (Patient not taking: Reported on 6/29/2023), Disp: 10 g, Rfl: 0  •  ciclopirox (LOPROX) 0.77 % cream, Apply topically 2 (two) times a day for 20 days, Disp: 90 g, Rfl: 2  •  glucose blood (OneTouch Verio) test strip, Check blood sugars twice daily. Please substitute with appropriate alternative as covered by patient's insurance.  Dx: E11.65, Disp: 200 each, Rfl: 3  •  Insulin Pen Needle 32G X 4 MM MISC, Use every evening, Disp: 100 each, Rfl: 5  •  nitroglycerin (NITROSTAT) 0.4 mg SL tablet, Place 1 tablet (0.4 mg total) under the tongue every 5 (five) minutes as needed for chest pain, Disp: 30 tablet, Rfl: 3  •  ondansetron (ZOFRAN) 4 mg tablet, Take 1 tablet (4 mg total) by mouth every 8 (eight) hours as needed for nausea or vomiting, Disp: 10 tablet, Rfl: 0  •  OneTouch Delica Lancets 07W MISC, Check blood sugars twice daily. Please substitute with appropriate alternative as covered by patient's insurance.  Dx: E11.65, Disp: 200 each, Rfl: 3    Laboratory Results:  Results for orders placed or performed in visit on 22/34/53   Basic metabolic panel   Result Value Ref Range    Sodium 136 135 - 147 mmol/L    Potassium 4.3 3.5 - 5.3 mmol/L    Chloride 103 96 - 108 mmol/L    CO2 28 21 - 32 mmol/L    ANION GAP 5 mmol/L    BUN 48 (H) 5 - 25 mg/dL    Creatinine 2.09 (H) 0.60 - 1.30 mg/dL    Glucose, Fasting 74 65 - 99 mg/dL    Calcium 9.6 8.3 - 10.1 mg/dL    eGFR 29 ml/min/1.73sq m

## 2023-07-26 NOTE — PROGRESS NOTES
Daily Note     Today's date: 2023  Patient name: Kati Nash  : 1945  MRN: [id number]  Referring provider: Jose David Donato DO  Dx:   Encounter Diagnosis     ICD-10-CM    1. 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 Imbalance  R26.89                      Subjective: Pt reports he is doing good and has no new complaints    Objective: See treatment diary below      Assessment: Tolerated treatment well with improving balance with additional dynamic balance exercises today. Patient demonstrated fatigue post treatment and would benefit from continued PT to further decrease hip flexor tightness to improve posture with ambulation and improve balance. Plan: Continue per plan of care. Cut off scores: All data taken from APTA Neuro Section or Rehab Measures    [place]ennon: <46/56=falls risk  MDC: 6 pts  Age Norms:  57-79: M - 54   F - 55  70-79: M - 47   F - 53  80-89: M - 48   F - 50 5xSTS: Gail et al 2010  MDC: 2.3 sec  Age Norms:  60-69: 11.1 sec  70-79: 12.6 sec  80-89: 14.8 sec   TUG  MDC: 4.14 sec  Cut off score:  >13.5 sec community dwelling adults  >32.2 sec frail elderly  <20 sec:  I for basic transfers  >30: dependent for transfers 10 Meter Walk Test Norms: Tay Campos and Anne gutierrez 2011  20-29: M - 1.35 m/s   F - 1.34 m/s  30-39: M - 1.43 m/s   F - 1.34 m/s  40-49: M - 1.43 m/s   F - 1.39 m/s  50-59: M - 1.43 m/s   F - 1.31 m/s  60-69: M - 1.34 m/s   F - 1.24 m/s  70-79: M - 1.26 m/s   F - 1.13 m/s  80-89: M - 0.97 m/s   F - 0.94 m/s   FGA  MCID: 4 pts  Geriatrics/community < 22/30 fall risk  Geriatrics/community < 20/30 unexplained falls DGI  MDC: vestibular - 4 pts  MDC: geriatric/community - 3 pts  Falls risk <19/24   6 Minute Walk Test  Age Norms  57-79: M - 1876 ft (571.80 m)  F - 1765 ft (537.98 m)  70-79: M - 1729 ft (527.00 m)  F - 1545 ft (470.92 m)  80-89: M - 1368 ft (416.97 m)  F - 1286 ft (391.97 m) mCTSIB  Norm: 20-60 yrs  Eyes open firm: norm sway 0.21-0.48  Eyes closed firm: norm sway 0.48-0.99  Eyes open foam: norm sway 0.38-0.71  Eyes closed foam: norm sway 0.70-2.22       Outcome Measures Initial Eval  7/3/2023        5xSTS 17.67 sec        TUG 9.75 sec B/L UE push off        10 meter TBA ft/sec  m/s        VALENTE NT/56        FGA 14/30        mCTSIB  - FTEO (firm)  - FTEC (firm)    - FTEO (foam)  - FTEC (foam)   60 sec erect  15 sec mod sway   TBA sec  TBA sec        6MWT TBA meters in  min        ABC 44%         DHI NT               Precautions:   Past Medical History:   Diagnosis Date   • CAD (coronary artery disease)    • Cancer ([address])     prostate   • CHF (congestive heart failure) ([address])    • Diabetes mellitus ([address])    • Myocardial infarction ([address])      [place]: 7/3/2023  FOTO: NA  POC Expiration: 9/25/2023  Daily Treatment Log:  Date 7/24/2023 7/26/2023 7/17/2023 7/19/2023   Visit # 6  7  4 5   Auth         Manual                        There Exer 15' 18'  15'    LAQ 3" 2x10 R/L  3" 2x10 R/L    3" 2x10 R/L    Bridges 2x10 5" 2x10   2x10 2x10    LTR 5" x10 R/L  5" x10 R/L   10"x5 R/L 5" x10 R/L    HR/TR  B/L HR 2x10   B/L HR 1x10    Std hip ext  YTB @ ankles 1x10 ea CS   YTB @ ankles 1x10 ea CGA (NMReed) YTB @ ankles 1x10 ea CS   Std hip abd    YTB @ ankles 1x10 ea CGA (NMReed) YTB @ ankles 1x10 ea CS   Supine hip flexor stretch  20" x3 R/L  20" x3 R/L   20"x3 R/L 20"x3 R/L   Seated lumbar flexion     1x10    palloff press         Hip flexor stretch stand foot on step 10" x5 R/L  10" x5 R/L  5"x10 R/L 10"x10 R/L   HIWOT vs counter 1x10 1x10  1x10 1x10   recumb bike L2x5' L2x5'  L2x5'    SLR flex  1x10 R/L        Seated leg press  B/L 35# 1x10   Uni 15# 1x10 R/L               HEP        There Activ                                                        NMReed 25' 22'   30'    FTEO foam  Stance on foam mod sway 30" x2 CS       FTEC firm  EC on firm mod sway 30" x2 CS   EC on foam  60"x1 CGA EC on firm posterior LOB able to recover CGA 15" x2    Tandem walking  10' x2 open space CS/CGA 10' x4 open space CS/CGA   2 laps CGA near rail 10'x4 2 laps CGA near rail 10'x4   Monster walks         Side stepping  YTB @ ankles 10'x1 R/L CS near rail Over 3 hurdles ea way x3  YTB @ ankles 10'x1 R/L CGA near rail YTB @ ankles 10'x1 R/L CGA near rail   STS    Feet on foam; from low mat Feet on foam; from low mat 1x10    Fwd/retro amb         Amb with head turns         Walking marches     Alt cone taps 1x10 R/L CGA    FSU 1x10 R/L  8" step 1x10 R/L uni rail support       LSU  1x10 R/L  8" step 1x10 R/L uni rail support       Modalities                                HEP:   Access Code: [email or web address]  URL: [email or web address]  Date: 07/03/2023  Prepared by: Tiffanie Harvey    Exercises  - Modified Alexandre Stretch  - 1 x daily - 7 x weekly - 3 reps - 20 sec  hold  - Seated Lumbar Flexion Stretch  - 2-3 x daily - 7 x weekly - 1 sets - 10 reps  - Narrow Stance with Head Rotations and Counter Support  - 1 x daily - 7 x weekly - 2 reps - 20-30 hold  - Tandem Walking with Counter Support  - 1 x daily - 7 x weekly - 1 sets - 10 reps

## 2023-07-26 NOTE — PATIENT INSTRUCTIONS
Your creatinine is higher but this is not unexpected. Please check labs anytime over the next 2 weeks. Restart Amlodipine 5 mg daily (1/2 tablet of 10 mg)  Update with your BP readings in 4 weeks (check after medications in the AM). Follow up in 3-4 months.

## 2023-07-31 ENCOUNTER — OFFICE VISIT (OUTPATIENT)
Dept: PHYSICAL THERAPY | Facility: CLINIC | Age: 78
End: 2023-07-31
Payer: COMMERCIAL

## 2023-07-31 DIAGNOSIS — R26.89 IMBALANCE: ICD-10-CM

## 2023-07-31 DIAGNOSIS — R26.9 NEUROLOGIC GAIT DYSFUNCTION: Primary | ICD-10-CM

## 2023-07-31 PROCEDURE — 97110 THERAPEUTIC EXERCISES: CPT | Performed by: PHYSICAL THERAPIST

## 2023-07-31 PROCEDURE — 97112 NEUROMUSCULAR REEDUCATION: CPT | Performed by: PHYSICAL THERAPIST

## 2023-07-31 NOTE — PROGRESS NOTES
Daily Note     Today's date: 2023  Patient name: Nicolas Hawkins  : 1945  MRN: [id number]  Referring provider: Cordelia Aguilera DO  Dx:   Encounter Diagnosis     ICD-10-CM    1. 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 Imbalance  R26.89                      Subjective: Pt states he feels his balance is getting better. Objective: See treatment diary below      Assessment: Tolerated treatment well. Patient demonstrates continued hip flexor tightness and lumbar extension dysfunction, benefitting from ROM/stretches. He is challenged by advancements in [facility]. Plan: Continue per plan of care. Cut off scores: All data taken from APTA Neuro Section or Rehab Measures    Lennon: <46/56=falls risk  MDC: 6 pts  Age Norms:  57-79: M - 54   F - 55  70-79: M - 47   F - 53  80-89: M - 48   F - 50 5xSTS: Gail et al 2010  MDC: 2.3 sec  Age Norms:  60-69: 11.1 sec  70-79: 12.6 sec  80-89: 14.8 sec   TUG  MDC: 4.14 sec  Cut off score:  >13.5 sec community dwelling adults  >32.2 sec frail elderly  <20 sec:  I for basic transfers  >30: dependent for transfers 10 Meter Walk Test Norms: Cherry Swartz and Peter Smith al 2011  20-29: M - 1.35 m/s   F - 1.34 m/s  30-39: M - 1.43 m/s   F - 1.34 m/s  40-49: M - 1.43 m/s   F - 1.39 m/s  50-59: M - 1.43 m/s   F - 1.31 m/s  60-69: M - 1.34 m/s   F - 1.24 m/s  70-79: M - 1.26 m/s   F - 1.13 m/s  80-89: M - 0.97 m/s   F - 0.94 m/s   FGA  MCID: 4 pts  Geriatrics/community < 22/30 fall risk  Geriatrics/community < 20/30 unexplained falls DGI  MDC: vestibular - 4 pts  MDC: geriatric/community - 3 pts  Falls risk <19/24   6 Minute Walk Test  Age Norms  57-79: M - 1876 ft (571.80 m)  F - 1765 ft (537.98 m)  70-79: M - 1729 ft (527.00 m)  F - 1545 ft (470.92 m)  80-89: M - 1368 ft (416.97 m)  F - 1286 ft (391.97 m) mCTSIB  Norm: 20-60 yrs  Eyes open firm: norm sway 0.21-0.48  Eyes closed firm: norm sway 0.48-0.99  Eyes open foam: norm sway 0.38-0.71  Eyes closed foam: norm sway 0. 70-2.22       Outcome Measures Initial Eval  7/3/2023        5xSTS 17.67 sec        TUG 9.75 sec B/L UE push off        10 meter TBA ft/sec  m/s        VALENTE NT/56        FGA 14/30        mCTSIB  - FTEO (firm)  - FTEC (firm)    - FTEO (foam)  - FTEC (foam)   60 sec erect  15 sec mod sway   TBA sec  TBA sec        6MWT TBA meters in  min        ABC 44%         DHI NT               Precautions:   Past Medical History:   Diagnosis Date   • CAD (coronary artery disease)    • Cancer ([address])     prostate   • CHF (congestive heart failure) ([address])    • Diabetes mellitus ([address])    • Myocardial infarction ([address])      [place]: 7/3/2023  FOTO: NA  POC Expiration: 9/25/2023  Daily Treatment Log:  Date 7/24/2023 7/26/2023 7/31/2023 7/19/2023   Visit # 6  7 8  5   Auth         Manual                        There Exer 15' 18' 20'     LAQ 3" 2x10 R/L  3" 2x10 R/L    3" 2x10 R/L    Bridges 2x10 5" 2x10  W/TB abd grn 5" 2x10  2x10    LTR 5" x10 R/L  5" x10 R/L  10"x3 R/L  5" x10 R/L    HR/TR  B/L HR 2x10       Std hip ext  YTB @ ankles 1x10 ea CS    YTB @ ankles 1x10 ea CS   Std hip abd     YTB @ ankles 1x10 ea CS   Supine hip flexor stretch  20" x3 R/L  20" x3 R/L  20"x3 R/L  20"x3 R/L   Seated lumbar flexion         palloff press         Hip flexor stretch stand foot on step 10" x5 R/L  10" x5 R/L   10"x10 R/L   HIWOT vs counter 1x10 1x10 1x10  1x10   recumb bike L2x5' L2x5' L2x5'     SLR flex  1x10 R/L        Seated leg press  B/L 35# 1x10   Uni 15# 1x10 R/L  B/L 40# 2x10  Uni 20# 1x10 R/L             HEP        There Activ                                                        NMReed 25' 22'  25'     FTEO foam  Stance on foam mod sway 30" x2 CS  FT w/ head turns 1x10 R/L on foam     FTEC firm  EC on firm mod sway 30" x2 CS  EC feet apart on foam 20"x2 CGA  EC on firm posterior LOB able to recover CGA 15" x2    Tandem walking  10' x2 open space CS/CGA  10' x4 open space CS/CGA  20'x4 CGA open space  2 laps CGA near rail 10'x4   Monster walks         Side stepping  YTB @ ankles 10'x1 R/L CS near rail Over 3 hurdles ea way x3 Over 5 hurdles open space CGA 2x ea way  YTB @ ankles 10'x1 R/L CGA near rail   STS     Feet on foam; from low mat 1x10    Fwd/retro amb         Amb with head turns         Walking marches    Stand on foam; alt taps to target on 4" step 1x10 R/L CGA     FSU 1x10 R/L  8" step 1x10 R/L uni rail support  No rail, step to pattern 8" step 1x10 R/L CGA     LSU  1x10 R/L  8" step 1x10 R/L uni rail support       Modalities                                HEP:   Access Code: [email or web address]  URL: [email or web address]  Date: 07/03/2023  Prepared by: Rivera Weir    Exercises  - Modified Alexandre Stretch  - 1 x daily - 7 x weekly - 3 reps - 20 sec  hold  - Seated Lumbar Flexion Stretch  - 2-3 x daily - 7 x weekly - 1 sets - 10 reps  - Narrow Stance with Head Rotations and Counter Support  - 1 x daily - 7 x weekly - 2 reps - 20-30 hold  - Tandem Walking with Counter Support  - 1 x daily - 7 x weekly - 1 sets - 10 reps

## 2023-08-02 ENCOUNTER — EVALUATION (OUTPATIENT)
Dept: PHYSICAL THERAPY | Facility: CLINIC | Age: 78
End: 2023-08-02
Payer: COMMERCIAL

## 2023-08-02 DIAGNOSIS — R26.9 NEUROLOGIC GAIT DYSFUNCTION: Primary | ICD-10-CM

## 2023-08-02 DIAGNOSIS — R26.89 IMBALANCE: ICD-10-CM

## 2023-08-02 PROCEDURE — 97112 NEUROMUSCULAR REEDUCATION: CPT

## 2023-08-02 PROCEDURE — 97110 THERAPEUTIC EXERCISES: CPT

## 2023-08-02 NOTE — PROGRESS NOTES
PT Re-Evaluation     Today's date: 2023  Patient name: Kurt Almodovar  : 1945  MRN: [id number]  Referring provider: Shelby Mccarthy DO  Dx:   Encounter Diagnosis     ICD-10-CM    1. 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 Imbalance  R26.89                      Assessment  Assessment details: Kurt Almodovar is a 78 y.o. male who presents with improvements in balance testing including FGA, mCTSIB, and ABC, however deficits are still present. Due to these impairments, patient has difficulty performing ADL's, recreational activities, ambulation, stair negotiation, transfers. FGA indicates patient is still a risk for falls at this time. Patient demo decrease in reliance on visual systems with improvements in FTEC and walking with eyes closed. Patient has been educated in home exercise program and plan of care.  Patient would benefit from skilled physical therapy services to address their aforementioned functional limitations and progress towards prior level of function and independence with home exercise program.     Impairments: abnormal gait, abnormal or restricted ROM, impaired balance, impaired physical strength, lacks appropriate home exercise program, pain with function, safety issue, poor posture  and poor body mechanics  Understanding of Dx/Px/POC: good   Prognosis: good    Goals  Short term goals to be achieved by 4 weeks:   STG1: Patient will be independent with simple HEP to maximize progress between therapy sessions ---MET  STG2: Patient will improve FGA score by 4 points per MDC to promote improved safety with dynamic tasks ---MET  STG3: Patient will performed FTEO on foam with </= mild sway for 30 sec to demo improved balance --MET  STG4: Patient will perform FTEC on firm with </= mild sway for 30 sec to demo improved balance ---partially MET 20 sec   STG5: Patient will demo tandem walking of 4 steps to improved FGA score and demo improved balance ---MET      Long Term Goals to be achieved by 12 weeks:   LTG1: Patient will be independent with updated HEP to maximize progress between therapy sessions ---progressing towards  LTG2: Patient will score low risk for falls with FGA test with score of 23/30 or higher per current research data ---progressing towards  LTG3: Patient will demo FTEC on foam with </= mild sway for 30 sec to demo improved static balance ---progressing towards  LTG4: Patient will demo tandem walking of 10 steps to improve FGA score and demo improved dynamic balance ---Partially Met (9 steps)   LTG5: Patient will improve 5xSTS to 13 sec or less to demo decreased risk for falls, and improved functional mobility ---progressing towards        Plan  Plan details: HEP development, stretching, strengthening, A/AA/PROM, joint mobilizations, posture education, STM/MI as needed to reduce muscle tension, muscle reeducation, PLOC discussed and agreed upon with patient  Patient would benefit from: skilled physical therapy  Planned modality interventions: cryotherapy and thermotherapy: hydrocollator packs  Planned therapy interventions: balance/weight bearing training, coordination, gait training, home exercise program, therapeutic exercise, therapeutic activities, strengthening, patient education, neuromuscular re-education, abdominal trunk stabilization and manual therapy  Frequency: 2x week  Plan of Care beginning date: 7/3/2023  Plan of Care expiration date: 9/25/2023  Treatment plan discussed with: patient        Subjective Evaluation    History of Present Illness  Mechanism of injury: Patient reports to PT for re-evaluation of imbalance and gait disturbances. Patient reports he is feeling more confident with balance which was noted on ABC. Patient stated uneven surfaces are still difficult but have gotten better since IE. Patient reports he feels he will loose his balance at time when leaning forward to goal or pick something up off the floor.    Patient Goals  Patient goals for therapy: improved balance, increased motion, increased strength, independence with ADLs/IADLs and return to sport/leisure activities    Pain  At best pain ratin  At worst pain ratin  Location: low back pain   Quality: dull ache  Relieving factors: change in position and rest  Aggravating factors: walking and stair climbing    Social Support  Steps to enter house: yes  3  Stairs in house: no   Lives in: [place] house  Lives with: alone    Treatments  No previous or current treatments        Objective      LE Strength: MMT      R  L  Hip flexion:    4+/5  4+/5  Hip abduction:   4/5  4/5  Knee extension:  4+/5  4+/5  Knee flexion:    4+/5  4+/5  Ankle dorsiflexion:  4+/5  4+/5  Ankle plantarflexion:   4+/5  4+/5      Flexibility:  Hamstrings: mod increase in muscle tension  Hip flexors: mod increase in muscle tension     Function:  Gait: Patient ambulating with decrease in forward trunk lean compared to IE, increased step length and gait speed. Stairs: Patient able to ascend 8" step with no UE however CS provided for safety. Sensation: patient has diagnosed neuropathy B/L   7/3/2023(IE)   DERMATOMAL TESTING:          L2 anterior thigh: intact B/L     L3 medial knee:  intact B/L   L4 medial maleolus:  absent B/L   L5 1st web space:   absent B/L   S1 lateral/sole foot:  impaired on RLE absent on LLE  S2 popliteal fossa    intact B/L         Coordination  2023:  - Dysmetria: intact for speed and accuracy   - Dysdiadochokinesia: intact for speed and accuracy       Transfers:   2023: able to perform STS and supine to/from siting with min difficulty. Cut off scores:   All data taken from APTA Neuro Section or Rehab Measures    Lennon: <46/56=falls risk  MDC: 6 pts  Age Norms:  57-79: M - 54   F - 55  70-79: M - 47   F - 53  80-89: M - 48   F - 50 5xSTS: Gail et al 2010  MDC: 2.3 sec  Age Norms:  60-69: 11.1 sec  70-79: 12.6 sec  80-89: 14.8 sec   TUG  MDC: 4.14 sec  Cut off score:  >13.5 sec community dwelling adults  >32.2 sec frail elderly  <20 sec:  I for basic transfers  >30: dependent for transfers 10 Meter Walk Test Norms: Donald Etienne and Minoo gutierrez 2011  20-29: M - 1.35 m/s   F - 1.34 m/s  30-39: M - 1.43 m/s   F - 1.34 m/s  40-49: M - 1.43 m/s   F - 1.39 m/s  50-59: M - 1.43 m/s   F - 1.31 m/s  60-69: M - 1.34 m/s   F - 1.24 m/s  70-79: M - 1.26 m/s   F - 1.13 m/s  80-89: M - 0.97 m/s   F - 0.94 m/s   FGA  MCID: 4 pts  Geriatrics/community < 22/30 fall risk  Geriatrics/community < 20/30 unexplained falls DGI  MDC: vestibular - 4 pts  MDC: geriatric/community - 3 pts  Falls risk <19/24   6 Minute Walk Test  Age Norms  57-79: M - 1876 ft (571.80 m)  F - 1765 ft (537.98 m)  70-79: M - 1729 ft (527.00 m)  F - 1545 ft (470.92 m)  80-89: M - 1368 ft (416.97 m)  F - 1286 ft (391.97 m) mCTSIB  Norm: 20-60 yrs  Eyes open firm: norm sway 0.21-0.48  Eyes closed firm: norm sway 0.48-0.99  Eyes open foam: norm sway 0.38-0.71  Eyes closed foam: norm sway 0.70-2.22       Outcome Measures Initial Eval  7/3/2023 Re-eval   8/2/2023       5xSTS 17.67 sec 17.2 sec no UE        TUG 9.75 sec B/L UE push off  9.2 sec B/L UE push off       10 meter TBA ft/sec  m/s NT       VICTOR NT/56 NT       FGA 14/30 21/30       mCTSIB  - FTEO (firm)  - FTEC (firm)    - FTEO (foam)  - FTEC (foam)   60 sec erect  15 sec mod sway   TBA sec  TBA sec   60 sec erect  20 sec min-mod   30 sec min   TBA       6MWT TBA meters in  min        ABC 44%  74.8%       DHI NT NT              Precautions:   Past Medical History:   Diagnosis Date   • CAD (coronary artery disease)    • Cancer ([address])     prostate   • CHF (congestive heart failure) ([address])    • Diabetes mellitus ([address])    • Myocardial infarction ([address])        [place]: 7/3/2023  FOTO: NA  POC Expiration: 9/25/2023  Daily Treatment Log:  Date 7/24/2023 7/26/2023 7/31/2023 8/2/2023    Visit # 6  7 8 9 (RE/ ABC)     Auth         Manual                        There Exer 15' 18' 20' 20'    LAQ 3" 2x10 R/L 3" 2x10 R/L       Bridges 2x10 5" 2x10  W/TB abd grn 5" 2x10     LTR 5" x10 R/L  5" x10 R/L  10"x3 R/L     HR/TR  B/L HR 2x10       Std hip ext  YTB @ ankles 1x10 ea CS   YTB @ ankles 1x10 R/L     Std hip abd    YTB @ ankles 1x10 R/L     Supine hip flexor stretch  20" x3 R/L  20" x3 R/L  20"x3 R/L     Seated lumbar flexion         palloff press         Hip flexor stretch stand foot on step 10" x5 R/L  10" x5 R/L  20" x3 R/L     HIWOT vs counter 1x10 1x10 1x10     recumb bike L2x5' L2x5' L2x5' L2x5'    SLR flex  1x10 R/L        Seated leg press  B/L 35# 1x10   Uni 15# 1x10 R/L  B/L 40# 2x10  Uni 20# 1x10 R/L     objective measures     EG    HEP    Updated and discussed     There Activ                                                        NMReed 25' 22'  25' 25'    FTEO foam  Stance on foam mod sway 30" x2 CS  FT w/ head turns 1x10 R/L on foam FT w/ head turns 1x10 R/L on foam    FTEC firm  EC on firm mod sway 30" x2 CS  EC feet apart on foam 20"x2 CGA     Tandem walking  10' x2 open space CS/CGA  10' x4 open space CS/CGA  20'x4 CGA open space 15'x4 CGA open space    Monster walks         Side stepping  YTB @ ankles 10'x1 R/L CS near rail Over 3 hurdles ea way x3 Over 5 hurdles open space CGA 2x ea way Over 3 hurdles ea way x3    STS        Fwd/retro amb     Fwd am over 3 hurdles reciprocal x3 ea way. Amb with head turns         Walking marches    Stand on foam; alt taps to target on 4" step 1x10 R/L CGA     FSU 1x10 R/L  8" step 1x10 R/L uni rail support  No rail, step to pattern 8" step 1x10 R/L CGA No rail, step to pattern 8" step 1x10 R/L CGA    LSU  1x10 R/L  8" step 1x10 R/L uni rail support       Cone  from ground     6 cones of various distances             TUG, FGA, 5xSTS     EG    Modalities                                HEP:   Access Code: [email or web address]  URL: [email or web address]  Date: 08/02/2023  Prepared by: Mariana Patel    Exercises  - Modified Alexandre Stretch  - 1 x daily - 7 x weekly - 3 reps - 20 sec  hold  - Supine Bridge  - 1 x daily - 7 x weekly - 2 sets - 10 reps  - Seated Lumbar Flexion Stretch  - 1-2 x daily - 7 x weekly - 1 sets - 10 reps  - Narrow Stance with Head Rotations and Counter Support  - 1 x daily - 7 x weekly - 2 reps - 20-30 hold  - Tandem Walking with Counter Support  - 1 x daily - 7 x weekly - 1 sets - 10 reps  - Standing Lumbar Extension with Counter  - 1-2 x daily - 7 x weekly - 1 sets - 10 reps  - Standing March with Counter Support  - 1 x daily - 7 x weekly - 2 sets - 10 reps  - Sit to Stand with Counter Support  - 1 x daily - 7 x weekly - 2 sets - 10 reps

## 2023-08-08 ENCOUNTER — OFFICE VISIT (OUTPATIENT)
Dept: PHYSICAL THERAPY | Facility: CLINIC | Age: 78
End: 2023-08-08
Payer: COMMERCIAL

## 2023-08-08 ENCOUNTER — APPOINTMENT (OUTPATIENT)
Dept: LAB | Facility: CLINIC | Age: 78
End: 2023-08-08
Payer: COMMERCIAL

## 2023-08-08 DIAGNOSIS — R26.89 IMBALANCE: ICD-10-CM

## 2023-08-08 DIAGNOSIS — I12.9 BENIGN HYPERTENSION WITH CKD (CHRONIC KIDNEY DISEASE) STAGE III (HCC): ICD-10-CM

## 2023-08-08 DIAGNOSIS — N18.32 STAGE 3B CHRONIC KIDNEY DISEASE (HCC): ICD-10-CM

## 2023-08-08 DIAGNOSIS — N18.30 BENIGN HYPERTENSION WITH CKD (CHRONIC KIDNEY DISEASE) STAGE III (HCC): ICD-10-CM

## 2023-08-08 DIAGNOSIS — R26.9 NEUROLOGIC GAIT DYSFUNCTION: Primary | ICD-10-CM

## 2023-08-08 LAB
ANION GAP SERPL CALCULATED.3IONS-SCNC: 4 MMOL/L
BUN SERPL-MCNC: 34 MG/DL (ref 5–25)
CALCIUM SERPL-MCNC: 9.3 MG/DL (ref 8.3–10.1)
CHLORIDE SERPL-SCNC: 107 MMOL/L (ref 96–108)
CO2 SERPL-SCNC: 29 MMOL/L (ref 21–32)
CREAT SERPL-MCNC: 1.88 MG/DL (ref 0.6–1.3)
GFR SERPL CREATININE-BSD FRML MDRD: 32 ML/MIN/1.73SQ M
GLUCOSE P FAST SERPL-MCNC: 105 MG/DL (ref 65–99)
MAGNESIUM SERPL-MCNC: 2.6 MG/DL (ref 1.6–2.6)
POTASSIUM SERPL-SCNC: 4.2 MMOL/L (ref 3.5–5.3)
SODIUM SERPL-SCNC: 140 MMOL/L (ref 135–147)

## 2023-08-08 PROCEDURE — 80048 BASIC METABOLIC PNL TOTAL CA: CPT

## 2023-08-08 PROCEDURE — 97110 THERAPEUTIC EXERCISES: CPT

## 2023-08-08 PROCEDURE — 83835 ASSAY OF METANEPHRINES: CPT

## 2023-08-08 PROCEDURE — 83735 ASSAY OF MAGNESIUM: CPT

## 2023-08-08 PROCEDURE — 36415 COLL VENOUS BLD VENIPUNCTURE: CPT

## 2023-08-08 PROCEDURE — 82088 ASSAY OF ALDOSTERONE: CPT

## 2023-08-08 PROCEDURE — 97112 NEUROMUSCULAR REEDUCATION: CPT

## 2023-08-08 PROCEDURE — 84244 ASSAY OF RENIN: CPT

## 2023-08-08 NOTE — PROGRESS NOTES
Daily Note     Today's date: 2023  Patient name: Rojelio Mckeon  : 1945  MRN: [id number]  Referring provider: Seema Kaplan DO  Dx:   Encounter Diagnosis     ICD-10-CM    1. 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 Imbalance  R26.89                      Subjective: Patient reports some difficulty getting up and down from the floor to do his bridges. PT suggested he do them in bed however he stated he preferred the floor. Objective: See treatment diary below      Assessment: Tolerated treatment well with improvements noted with FTEC on firm and FTEO on foam, however deficits in balance are still present. Patient required one instance of CGA with LSU however transitioned to CS with VC for slower controlled motions. Patient would benefit from continued PT      Plan: Continue per plan of care. Cut off scores: All data taken from APTA Neuro Section or Rehab Measures    Lennon: <46/56=falls risk  MDC: 6 pts  Age Norms:  57-79: M - 54   F - 55  70-79: M - 47   F - 53  80-89: M - 48   F - 50 5xSTS: Gail et al 2010  MDC: 2.3 sec  Age Norms:  60-69: 11.1 sec  70-79: 12.6 sec  80-89: 14.8 sec   TUG  MDC: 4.14 sec  Cut off score:  >13.5 sec community dwelling adults  >32.2 sec frail elderly  <20 sec:  I for basic transfers  >30: dependent for transfers 10 Meter Walk Test Norms: Matthew Chacon and Brennen gutierrez 2011  20-29: M - 1.35 m/s   F - 1.34 m/s  30-39: M - 1.43 m/s   F - 1.34 m/s  40-49: M - 1.43 m/s   F - 1.39 m/s  50-59: M - 1.43 m/s   F - 1.31 m/s  60-69: M - 1.34 m/s   F - 1.24 m/s  70-79: M - 1.26 m/s   F - 1.13 m/s  80-89: M - 0.97 m/s   F - 0.94 m/s   FGA  MCID: 4 pts  Geriatrics/community < 22/30 fall risk  Geriatrics/community < 20/30 unexplained falls DGI  MDC: vestibular - 4 pts  MDC: geriatric/community - 3 pts  Falls risk <19/24   6 Minute Walk Test  Age Norms  57-79: M - 1876 ft (571.80 m)  F - 1765 ft (537.98 m)  70-79: M - 1729 ft (527.00 m)  F - 1545 ft (470.92 m)  80-89: M - 1368 ft (416.97 m)  F - 1286 ft (391.97 m) mCTSIB  Norm: 20-60 yrs  Eyes open firm: norm sway 0.21-0.48  Eyes closed firm: norm sway 0.48-0.99  Eyes open foam: norm sway 0.38-0.71  Eyes closed foam: norm sway 0.70-2.22       Outcome Measures Initial Eval  7/3/2023 Re-eval   8/2/2023       5xSTS 17.67 sec 17.2 sec no UE        TUG 9.75 sec B/L UE push off  9.2 sec B/L UE push off       10 meter TBA ft/sec  m/s NT       VICTOR NT/56 NT       FGA 14/30 21/30       mCTSIB  - FTEO (firm)  - FTEC (firm)    - FTEO (foam)  - FTEC (foam)   60 sec erect  15 sec mod sway   TBA sec  TBA sec   60 sec erect  20 sec min-mod   30 sec min   TBA       6MWT TBA meters in  min        ABC 44%  74.8%       DHI NT NT              Precautions: FALLS  Past Medical History:   Diagnosis Date   • CAD (coronary artery disease)    • Cancer ([address])     prostate   • CHF (congestive heart failure) ([address])    • Diabetes mellitus ([address])    • Myocardial infarction ([address])        [facility]: 7/3/2023  FOTO: NA  POC Expiration: 9/25/2023  Daily Treatment Log:  Date 7/24/2023 7/26/2023 7/31/2023 8/2/2023 8/8/2023   Visit # 6  7 8 9 (RE/ ABC)  10   Auth         Manual                        There Exer 15' 18' 20' 20' 20'   LAQ 3" 2x10 R/L  3" 2x10 R/L    1.5# 1x10 R/L    Bridges 2x10 5" 2x10  W/TB abd grn 5" 2x10     LTR 5" x10 R/L  5" x10 R/L  10"x3 R/L     HR/TR  B/L HR 2x10       Std hip ext  YTB @ ankles 1x10 ea CS   YTB @ ankles 1x10 R/L  YTB @ ankles 2x10 R/L    Std hip abd    YTB @ ankles 1x10 R/L  YTB @ ankles 2x10 R/L    Supine hip flexor stretch  20" x3 R/L  20" x3 R/L  20"x3 R/L     Seated lumbar flexion         palloff press         Hip flexor stretch stand foot on step 10" x5 R/L  10" x5 R/L  20" x3 R/L  20" x3 R/L    HIWOT vs counter 1x10 1x10 1x10     recumb bike L2x5' L2x5' L2x5' L2x5' L2x5'   SLR flex  1x10 R/L        Seated leg press  B/L 35# 1x10   Uni 15# 1x10 R/L  B/L 40# 2x10  Uni 20# 1x10 R/L  B/L 40# 2x10  Uni 20# 1x10 R/L   objective measures     EG HEP    Updated and discussed     There Activ                                                        NMReed 25' 22'  25' 25' 25'   FTEO foam  Stance on foam mod sway 30" x2 CS  FT w/ head turns 1x10 R/L on foam FT w/ head turns 1x10 R/L on foam FT w/ head turns 1x10 R/L on foam CS   FTEC firm  EC on firm mod sway 30" x2 CS  EC feet apart on foam 20"x2 CGA  EC on firm mod sway 30" x2 CS   Tandem walking  10' x2 open space CS/CGA  10' x4 open space CS/CGA  20'x4 CGA open space 15'x4 CGA open space 15'x4 CGA open space   Monster walks         Side stepping  YTB @ ankles 10'x1 R/L CS near rail Over 3 hurdles ea way x3 Over 5 hurdles open space CGA 2x ea way Over 3 hurdles ea way x3    STS        Fwd/retro amb     Fwd am over 3 hurdles reciprocal x3 ea way. Amb with head turns         Walking marches    Stand on foam; alt taps to target on 4" step 1x10 R/L CGA     FSU 1x10 R/L  8" step 1x10 R/L uni rail support  No rail, step to pattern 8" step 1x10 R/L CGA No rail, step to pattern 8" step 1x10 R/L CGA No rail, step to pattern 8" step 1x10 R/L CS   LSU  1x10 R/L  8" step 1x10 R/L uni rail support    8" step 1x10 R/L uni rail support CS/CGA   Cone  from ground     6 cones of various distances  6 cones of various distances    WB         TUG, FGA, 5xSTS     EG    Modalities                                HEP:   Access Code: [email or web address]  URL: [email or web address]  Date: 08/02/2023  Prepared by: Charly Binder    Exercises  - Modified Alexandre Stretch  - 1 x daily - 7 x weekly - 3 reps - 20 sec  hold  - Supine Bridge  - 1 x daily - 7 x weekly - 2 sets - 10 reps  - Seated Lumbar Flexion Stretch  - 1-2 x daily - 7 x weekly - 1 sets - 10 reps  - Narrow Stance with Head Rotations and Counter Support  - 1 x daily - 7 x weekly - 2 reps - 20-30 hold  - Tandem Walking with Counter Support  - 1 x daily - 7 x weekly - 1 sets - 10 reps  - Standing Lumbar Extension with Counter  - 1-2 x daily - 7 x weekly - 1 sets - 10 reps  - Standing March with Counter Support  - 1 x daily - 7 x weekly - 2 sets - 10 reps  - Sit to Stand with Counter Support  - 1 x daily - 7 x weekly - 2 sets - 10 reps

## 2023-08-09 ENCOUNTER — TELEPHONE (OUTPATIENT)
Dept: NEPHROLOGY | Facility: CLINIC | Age: 78
End: 2023-08-09

## 2023-08-09 NOTE — TELEPHONE ENCOUNTER
Message left on patient's VM that labs of 8/8/23 show stable kidney function per  Dr. Bertin Sky.      ----- Message from Arabella Eden MD sent at 8/8/2023  5:19 PM EDT -----  Please inform patient that most recent labs (8/8/23) show that kidney function is stable.

## 2023-08-10 ENCOUNTER — OFFICE VISIT (OUTPATIENT)
Dept: PHYSICAL THERAPY | Facility: CLINIC | Age: 78
End: 2023-08-10
Payer: COMMERCIAL

## 2023-08-10 DIAGNOSIS — R26.9 NEUROLOGIC GAIT DYSFUNCTION: Primary | ICD-10-CM

## 2023-08-10 DIAGNOSIS — R26.89 IMBALANCE: ICD-10-CM

## 2023-08-10 PROCEDURE — 97110 THERAPEUTIC EXERCISES: CPT

## 2023-08-10 PROCEDURE — 97112 NEUROMUSCULAR REEDUCATION: CPT

## 2023-08-10 NOTE — PROGRESS NOTES
Daily Note     Today's date: 8/10/2023  Patient name: Case Poster  : 1945  MRN: [id number]  Referring provider: Sergio Lutz DO  Dx:   Encounter Diagnosis     ICD-10-CM    1. 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 Imbalance  R26.89                      Subjective: Patient reports he is doing well with no new complaints at this time. Objective: See treatment diary below      Assessment: Tolerated treatment well with improvements in balance noted with progression to stance on foam with eyes closed. Patient can currently perform 10+ tandem steps with only CS and no need for CGA compared to previous sessions. Patient would benefit from continued PT      Plan: Continue per plan of care. Cut off scores: All data taken from [facility] Neuro Section or Rehab Measures    Lennon: <46/56=falls risk  MDC: 6 pts  Age Norms:  57-79: M - 54   F - 55  70-79: M - 47   F - 53  80-89: M - 48   F - 50 5xSTS: Gail et al 2010  MDC: 2.3 sec  Age Norms:  60-69: 11.1 sec  70-79: 12.6 sec  80-89: 14.8 sec   TUG  MDC: 4.14 sec  Cut off score:  >13.5 sec community dwelling adults  >32.2 sec frail elderly  <20 sec:  I for basic transfers  >30: dependent for transfers 10 Meter Walk Test Norms: Jennifer Arreguin al 2011  20-29: M - 1.35 m/s   F - 1.34 m/s  30-39: M - 1.43 m/s   F - 1.34 m/s  40-49: M - 1.43 m/s   F - 1.39 m/s  50-59: M - 1.43 m/s   F - 1.31 m/s  60-69: M - 1.34 m/s   F - 1.24 m/s  70-79: M - 1.26 m/s   F - 1.13 m/s  80-89: M - 0.97 m/s   F - 0.94 m/s   FGA  MCID: 4 pts  Geriatrics/community < 22/30 fall risk  Geriatrics/community < 20/30 unexplained falls DGI  MDC: vestibular - 4 pts  MDC: geriatric/community - 3 pts  Falls risk <19/24   6 Minute Walk Test  Age Norms  57-79: M - 1876 ft (571.80 m)  F - 1765 ft (537.98 m)  70-79: M - 1729 ft (527.00 m)  F - 1545 ft (470.92 m)  80-89: M - 1368 ft (416.97 m)  F - 1286 ft (391.97 m) mCTSIB  Norm: 20-60 yrs  Eyes open firm: norm sway 0.21-0.48  Eyes closed firm: norm sway 0.48-0.99  Eyes open foam: norm sway 0.38-0.71  Eyes closed foam: norm sway 0.70-2.22       Outcome Measures Initial Eval  7/3/2023 Re-eval   8/2/2023       5xSTS 17.67 sec 17.2 sec no UE        TUG 9.75 sec B/L UE push off  9.2 sec B/L UE push off       10 meter TBA ft/sec  m/s NT       VICTOR NT/56 NT       FGA 14/30 21/30       mCTSIB  - FTEO (firm)  - FTEC (firm)    - FTEO (foam)  - FTEC (foam)   60 sec erect  15 sec mod sway   TBA sec  TBA sec   60 sec erect  20 sec min-mod   30 sec min   TBA       6MWT TBA meters in  min        ABC 44%  74.8%       DHI NT NT              Precautions: FALLS  Past Medical History:   Diagnosis Date   • CAD (coronary artery disease)    • Cancer ([address])     prostate   • CHF (congestive heart failure) ([address])    • Diabetes mellitus ([address])    • Myocardial infarction ([address])        [place]: 7/3/2023  FOTO: NA  POC Expiration: 9/25/2023  Daily Treatment Log:  Date 8/10/2023   8/2/2023 8/8/2023   Visit # 11   9 (RE/ ABC)  10   Auth         Manual                        There Exer 15'   20' 20'   LAQ 1.5# 2x10 R/L    1.5# 1x10 R/L    Bridges 2x10        LTR 5" x10        HR/TR         Std hip ext  YTB @ ankles 2x10 R/L    YTB @ ankles 1x10 R/L  YTB @ ankles 2x10 R/L    Std hip abd YTB @ ankles 2x10 R/L    YTB @ ankles 1x10 R/L  YTB @ ankles 2x10 R/L    Supine hip flexor stretch  20" x3 R/L        palloff press         Hip flexor stretch stand foot on step    20" x3 R/L  20" x3 R/L    HIWOT vs counter        recumb bike L3x5'    L2x5' L2x5'   Seated leg press B/L 40# 2x10  Uni 20# 1x10 R/L    B/L 40# 2x10  Uni 20# 1x10 R/L   objective measures     EG    HEP    Updated and discussed     There Activ                                                        NMReed 25'   25' 25'   FTEO foam  FT w/ head turns 1x10 R/L on foam   FT w/ head turns 1x10 R/L on foam FT w/ head turns 1x10 R/L on foam CS   FTEC firm  Stance on foam EC 30" x2     EC on firm mod sway 30" x2 CS   Tandem walking  15'x4 CS open space   15'x4 CGA open space 15'x4 CGA open space   Side stepping     Over 3 hurdles ea way x3    Fwd/retro amb     Fwd am over 3 hurdles reciprocal x3 ea way. FSU No rail, step to pattern 8" step 1x10 R/L CS   No rail, step to pattern 8" step 1x10 R/L CGA No rail, step to pattern 8" step 1x10 R/L CS   LSU      8" step 1x10 R/L uni rail support CS/CGA   Cone  from ground     6 cones of various distances  6 cones of various distances    WB  AP/ML x20 ea. TUG, FGA, 5xSTS     EG    Modalities                                HEP:   Access Code: [email or web address]  URL: [email or web address]  Date: 08/02/2023  Prepared by: Milka Grumbling    Exercises  - Modified Alexandre Stretch  - 1 x daily - 7 x weekly - 3 reps - 20 sec  hold  - Supine Bridge  - 1 x daily - 7 x weekly - 2 sets - 10 reps  - Seated Lumbar Flexion Stretch  - 1-2 x daily - 7 x weekly - 1 sets - 10 reps  - Narrow Stance with Head Rotations and Counter Support  - 1 x daily - 7 x weekly - 2 reps - 20-30 hold  - Tandem Walking with Counter Support  - 1 x daily - 7 x weekly - 1 sets - 10 reps  - Standing Lumbar Extension with Counter  - 1-2 x daily - 7 x weekly - 1 sets - 10 reps  - Standing March with Counter Support  - 1 x daily - 7 x weekly - 2 sets - 10 reps  - Sit to Stand with Counter Support  - 1 x daily - 7 x weekly - 2 sets - 10 reps

## 2023-08-11 LAB — ALDOST SERPL-MCNC: 4.3 NG/DL (ref 0–30)

## 2023-08-12 LAB
METANEPH FREE SERPL-MCNC: 21.1 PG/ML (ref 0–88)
NORMETANEPHRINE SERPL-MCNC: 95.1 PG/ML (ref 0–285.2)

## 2023-08-14 LAB — RENIN PLAS-CCNC: 1.29 NG/ML/HR (ref 0.17–5.38)

## 2023-08-16 ENCOUNTER — OFFICE VISIT (OUTPATIENT)
Dept: PHYSICAL THERAPY | Facility: CLINIC | Age: 78
End: 2023-08-16
Payer: COMMERCIAL

## 2023-08-16 DIAGNOSIS — R26.9 NEUROLOGIC GAIT DYSFUNCTION: Primary | ICD-10-CM

## 2023-08-16 DIAGNOSIS — R26.89 IMBALANCE: ICD-10-CM

## 2023-08-16 PROCEDURE — 97112 NEUROMUSCULAR REEDUCATION: CPT

## 2023-08-16 PROCEDURE — 97110 THERAPEUTIC EXERCISES: CPT

## 2023-08-16 NOTE — PROGRESS NOTES
Daily Note     Today's date: 2023  Patient name: Toyin Washington  : 1945  MRN: [id number]  Referring provider: Hayden Esposito DO  Dx:   Encounter Diagnosis     ICD-10-CM    1. 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 Imbalance  R26.89                      Subjective: Patient reports the bridges were not eaay to do on his bed as it squishes down under his feet, therefore he has been doing it on the floor. Patient states it is a bit difficult to get up off the floor but he is able to manage it. Objective: See treatment diary below      Assessment: Tolerated treatment well and was challenged with addition of side stepping on foam balance beam. CS and one instances of CGA with posterior LOB provided with patient using light touch UE support on widowsill as needed. Patient would benefit from continued PT      Plan: Continue per plan of care. Cut off scores: All data taken from [facility] Neuro Section or Rehab Measures    Lennon: <46/56=falls risk  MDC: 6 pts  Age Norms:  57-79: M - 54   F - 55  70-79: M - 47   F - 53  80-89: M - 48   F - 50 5xSTS: Gail et al 2010  MDC: 2.3 sec  Age Norms:  60-69: 11.1 sec  70-79: 12.6 sec  80-89: 14.8 sec   TUG  MDC: 4.14 sec  Cut off score:  >13.5 sec community dwelling adults  >32.2 sec frail elderly  <20 sec:  I for basic transfers  >30: dependent for transfers 10 Meter Walk Test Norms: Chung gutierrez 2011  20-29: M - 1.35 m/s   F - 1.34 m/s  30-39: M - 1.43 m/s   F - 1.34 m/s  40-49: M - 1.43 m/s   F - 1.39 m/s  50-59: M - 1.43 m/s   F - 1.31 m/s  60-69: M - 1.34 m/s   F - 1.24 m/s  70-79: M - 1.26 m/s   F - 1.13 m/s  80-89: M - 0.97 m/s   F - 0.94 m/s   FGA  MCID: 4 pts  Geriatrics/community < 22/30 fall risk  Geriatrics/community < 20/30 unexplained falls DGI  MDC: vestibular - 4 pts  MDC: geriatric/community - 3 pts  Falls risk <19/24   6 Minute Walk Test  Age Norms  57-79: M - 1876 ft (571.80 m)  F - 1765 ft (537.98 m)  70-79: M - 1729 ft (527.00 m) F - 1545 ft (470.92 m)  80-89: M - 1368 ft (416.97 m)  F - 1286 ft (391.97 m) mCTSIB  Norm: 20-60 yrs  Eyes open firm: norm sway 0.21-0.48  Eyes closed firm: norm sway 0.48-0.99  Eyes open foam: norm sway 0.38-0.71  Eyes closed foam: norm sway 0.70-2.22       Outcome Measures Initial Eval  7/3/2023 Re-eval   8/2/2023 8/16/2023      5xSTS 17.67 sec 17.2 sec no UE        TUG 9.75 sec B/L UE push off  9.2 sec B/L UE push off       10 meter TBA ft/sec  m/s NT       VICTOR NT/56 NT       FGA 14/30 21/30       mCTSIB  - FTEO (firm)  - FTEC (firm)    - FTEO (foam)  - FTEC (foam)   60 sec erect  15 sec mod sway   TBA sec  TBA sec   60 sec erect  20 sec min-mod   30 sec min   TBA   60 sec erect  30 sec min sway  60 sec erect  30 sec min sway       6MWT TBA meters in  min        ABC 44%  74.8%       DHI NT NT              Precautions: FALLS  Past Medical History:   Diagnosis Date   • CAD (coronary artery disease)    • Cancer ([address])     prostate   • CHF (congestive heart failure) ([address])    • Diabetes mellitus ([address])    • Myocardial infarction ([address])        [facility]: 7/3/2023  FOTO: NA  POC Expiration: 9/25/2023  Daily Treatment Log:  Date 8/10/2023 8/16/2023   8/8/2023   Visit # 11 12   10   Auth         Manual                        There Exer 15' 15'   20'   LAQ 1.5# 2x10 R/L 2# 2x10 R/L   1.5# 1x10 R/L    Bridges 2x10  2x10       LTR 5" x10  5" x10       HR/TR         Std hip ext  YTB @ ankles 2x10 R/L  YTB @ ankles 2x10 R/L    YTB @ ankles 2x10 R/L    Std hip abd YTB @ ankles 2x10 R/L  YTB @ ankles 2x10 R/L    YTB @ ankles 2x10 R/L    Supine hip flexor stretch  20" x3 R/L  20" x3 R/L       palloff press         Hip flexor stretch stand foot on step     20" x3 R/L    HIWOT vs counter        recumb bike L3x5'  L3x5'    L2x5'   Seated leg press B/L 40# 2x10  Uni 20# 1x10 R/L    B/L 40# 2x10  Uni 20# 1x10 R/L   objective measures         HEP        There Activ                                                        NMReed 25' 25'   25' FTEO foam  FT w/ head turns 1x10 R/L on foam FT w/ head turns 1x10 R/L on foam: erect   FT w/ head turns 1x10 R/L on foam CS   FTEC firm  Stance on foam EC 30" x2  Stance on foam EC 30" x2 min way    EC on firm mod sway 30" x2 CS   Tandem walking  15'x4 CS open space    15'x4 CGA open space   Side stepping   On one foam BB x2 ea way       FSU No rail, step to pattern 8" step 1x10 R/L CS No rail, step to pattern 8" step 1x10 R/L DS   No rail, step to pattern 8" step 1x10 R/L CS   LSU   Straddle step up 6" step 1x10 R/L DS    8" step 1x10 R/L uni rail support CS/CGA   Cone  from ground      6 cones of various distances    WB  AP/ML x20 ea. TUG, FGA, 5xSTS         Modalities                                HEP:   Access Code: [email or web address]  URL: [email or web address]  Date: 08/02/2023  Prepared by: Mariana Patel    Exercises  - Modified Alexandre Stretch  - 1 x daily - 7 x weekly - 3 reps - 20 sec  hold  - Supine Bridge  - 1 x daily - 7 x weekly - 2 sets - 10 reps  - Seated Lumbar Flexion Stretch  - 1-2 x daily - 7 x weekly - 1 sets - 10 reps  - Narrow Stance with Head Rotations and Counter Support  - 1 x daily - 7 x weekly - 2 reps - 20-30 hold  - Tandem Walking with Counter Support  - 1 x daily - 7 x weekly - 1 sets - 10 reps  - Standing Lumbar Extension with Counter  - 1-2 x daily - 7 x weekly - 1 sets - 10 reps  - Standing March with Counter Support  - 1 x daily - 7 x weekly - 2 sets - 10 reps  - Sit to Stand with Counter Support  - 1 x daily - 7 x weekly - 2 sets - 10 reps

## 2023-08-18 ENCOUNTER — OFFICE VISIT (OUTPATIENT)
Dept: PHYSICAL THERAPY | Facility: CLINIC | Age: 78
End: 2023-08-18
Payer: COMMERCIAL

## 2023-08-18 DIAGNOSIS — R26.9 NEUROLOGIC GAIT DYSFUNCTION: Primary | ICD-10-CM

## 2023-08-18 DIAGNOSIS — R26.89 IMBALANCE: ICD-10-CM

## 2023-08-18 PROCEDURE — 97112 NEUROMUSCULAR REEDUCATION: CPT

## 2023-08-18 PROCEDURE — 97110 THERAPEUTIC EXERCISES: CPT

## 2023-08-18 NOTE — PROGRESS NOTES
Daily Note     Today's date: 2023  Patient name: Toyin Washington  : 1945  MRN: [id number]  Referring provider: Hayden Esposito DO  Dx:   Encounter Diagnosis     ICD-10-CM    1. 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 Imbalance  R26.89                      Subjective: Patient arrives reporting no new complaints. Objective: See treatment diary below      Assessment: Tolerated treatment well and with pt challenged by balance activities on foam. Noted to have improved ability to  cones today, with pt noting he feels unsteady sometimes still at home when he is trying to pick something up. Consider progressing challenge to  objects of some weight from the floor. Patient demonstrated fatigue post treatment, exhibited good technique with therapeutic exercises and would benefit from continued PT      Plan: Continue per plan of care. Progress treatment as tolerated. Cut off scores: All data taken from [facility]TA Neuro Section or Rehab Measures    Lennon: <46/56=falls risk  MDC: 6 pts  Age Norms:  57-79: M - 54   F - 55  70-79: M - 47   F - 53  80-89: M - 48   F - 50 5xSTS: Gail et al 2010  MDC: 2.3 sec  Age Norms:  60-69: 11.1 sec  70-79: 12.6 sec  80-89: 14.8 sec   TUG  MDC: 4.14 sec  Cut off score:  >13.5 sec community dwelling adults  >32.2 sec frail elderly  <20 sec:  I for basic transfers  >30: dependent for transfers 10 Meter Walk Test Norms: Chung gutierrez 2011  20-29: M - 1.35 m/s   F - 1.34 m/s  30-39: M - 1.43 m/s   F - 1.34 m/s  40-49: M - 1.43 m/s   F - 1.39 m/s  50-59: M - 1.43 m/s   F - 1.31 m/s  60-69: M - 1.34 m/s   F - 1.24 m/s  70-79: M - 1.26 m/s   F - 1.13 m/s  80-89: M - 0.97 m/s   F - 0.94 m/s   FGA  MCID: 4 pts  Geriatrics/community < 22/30 fall risk  Geriatrics/community < 20/30 unexplained falls DGI  MDC: vestibular - 4 pts  MDC: geriatric/community - 3 pts  Falls risk <19/24   6 Minute Walk Test  Age Norms  57-79: M - 1876 ft (571.80 m)  F - 1765 ft (537.98 m)  70-79: M - 1729 ft (527.00 m)  F - 1545 ft (470.92 m)  80-89: M - 1368 ft (416.97 m)  F - 1286 ft (391.97 m) mCTSIB  Norm: 20-60 yrs  Eyes open firm: norm sway 0.21-0.48  Eyes closed firm: norm sway 0.48-0.99  Eyes open foam: norm sway 0.38-0.71  Eyes closed foam: norm sway 0.70-2.22       Outcome Measures Initial Eval  7/3/2023 Re-eval   8/2/2023 8/16/2023      5xSTS 17.67 sec 17.2 sec no UE        TUG 9.75 sec B/L UE push off  9.2 sec B/L UE push off       10 meter TBA ft/sec  m/s NT       VICTOR NT/56 NT       FGA 14/30 21/30       mCTSIB  - FTEO (firm)  - FTEC (firm)    - FTEO (foam)  - FTEC (foam)   60 sec erect  15 sec mod sway   TBA sec  TBA sec   60 sec erect  20 sec min-mod   30 sec min   TBA   60 sec erect  30 sec min sway  60 sec erect  30 sec min sway       6MWT TBA meters in  min        ABC 44%  74.8%       DHI NT NT              Precautions: FALLS  Past Medical History:   Diagnosis Date   • CAD (coronary artery disease)    • Cancer ([address])     prostate   • CHF (congestive heart failure) ([address])    • Diabetes mellitus ([address])    • Myocardial infarction ([address])        [place] [facility]: 7/3/2023  FOTO: NA  POC Expiration: 9/25/2023  Daily Treatment Log:  Date 8/10/2023 8/16/2023 8/18/23  8/8/2023   Visit # 11 12 13  10   Auth         Manual                        There Exer 15' 15' 18'  20'   LAQ 1.5# 2x10 R/L 2# 2x10 R/L   1.5# 1x10 R/L    Bridges 2x10  2x10       Seated HS stretch   15"x3 R/L cues for form, knee ext     LTR 5" x10  5" x10       HR/TR         Std hip ext  YTB @ ankles 2x10 R/L  YTB @ ankles 2x10 R/L  RTB at knees 2x10 R/L  YTB @ ankles 2x10 R/L    Std hip abd YTB @ ankles 2x10 R/L  YTB @ ankles 2x10 R/L  RTB @ knees 2x10 R/L  YTB @ ankles 2x10 R/L    Supine hip flexor stretch  20" x3 R/L  20" x3 R/L       palloff press         Hip flexor stretch stand foot on step     20" x3 R/L    HIWOT vs counter        recumb bike L3x5'   L3x5'  L2x5'   Seated leg press B/L 40# 2x10  Uni 20# 1x10 R/L  B/L 60# 2x10  Uni 30# 2x10 R/L  B/L 40# 2x10  Uni 20# 1x10 R/L   objective measures         HEP        There Activ                                                        NMReed 25' 25' 22'  25'   FTEO foam  FT w/ head turns 1x10 R/L on foam FT w/ head turns 1x10 R/L on foam: erect horiz HT 2x15 R/L upright posture, min sway  FT w/ head turns 1x10 R/L on foam CS   FTEC firm  Stance on foam EC 30" x2  Stance on foam EC 30" x2 min way  Stance on foam EC 30"x2  Min sway  EC on firm mod sway 30" x2 CS   Tandem walking  15'x4 CS open space  15'x 4 CS open space  15'x4 CGA open space   Side stepping   On one foam BB x2 ea way  On 1 foam beam 3 laps R/L no UE, 1 instance of CG req for post LOB     FSU No rail, step to pattern 8" step 1x10 R/L CS No rail, step to pattern 8" step 1x10 R/L DS No rail 8" step 1x10 R/L w/ fatigue by end of set  No rail, step to pattern 8" step 1x10 R/L CS   LSU   Straddle step up 6" step 1x10 R/L DS  Straddle step-up 6" step 1x10 R/L  8" step 1x10 R/L uni rail support CS/CGA   Cone  from ground    5 cones, 3 standing 2 lying  6 cones of various distances    WB  AP/ML x20 ea. A/P taps no UE 20x     TUG, FGA, 5xSTS         Modalities                                HEP:   Access Code: [email or web address]  URL: [email or web address]  Date: 08/02/2023  Prepared by: Ayah Pineda    Exercises  - Modified Alexandre Stretch  - 1 x daily - 7 x weekly - 3 reps - 20 sec  hold  - Supine Bridge  - 1 x daily - 7 x weekly - 2 sets - 10 reps  - Seated Lumbar Flexion Stretch  - 1-2 x daily - 7 x weekly - 1 sets - 10 reps  - Narrow Stance with Head Rotations and Counter Support  - 1 x daily - 7 x weekly - 2 reps - 20-30 hold  - Tandem Walking with Counter Support  - 1 x daily - 7 x weekly - 1 sets - 10 reps  - Standing Lumbar Extension with Counter  - 1-2 x daily - 7 x weekly - 1 sets - 10 reps  - Standing March with Counter Support  - 1 x daily - 7 x weekly - 2 sets - 10 reps  - Sit to Stand with Counter Support  - 1 x daily - 7 x weekly - 2 sets - 10 reps

## 2023-08-21 ENCOUNTER — OFFICE VISIT (OUTPATIENT)
Dept: PHYSICAL THERAPY | Facility: CLINIC | Age: 78
End: 2023-08-21
Payer: COMMERCIAL

## 2023-08-21 DIAGNOSIS — R26.89 IMBALANCE: ICD-10-CM

## 2023-08-21 DIAGNOSIS — R26.9 NEUROLOGIC GAIT DYSFUNCTION: Primary | ICD-10-CM

## 2023-08-21 PROCEDURE — 97112 NEUROMUSCULAR REEDUCATION: CPT

## 2023-08-21 PROCEDURE — 97110 THERAPEUTIC EXERCISES: CPT

## 2023-08-21 NOTE — PROGRESS NOTES
Daily Note     Today's date: 2023  Patient name: Gilson Roper  : 1945  MRN: [id number]  Referring provider: Autumn Suarez DO  Dx:   Encounter Diagnosis     ICD-10-CM    1. 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 Imbalance  R26.89                      Subjective: Patient arrives reporting no new complaints and feels he is ready for D/C next visit. Patient states he is able to continue to challenge balance and strength with HEP. Objective: See treatment diary below      Assessment: Tolerated treatment well with patient challenged by straddle step ups with balance and coordination of motion. Patient able to perform w/ CS and verbal and visual cues for form. Patient demonstrated fatigue post treatment, exhibited good technique with therapeutic exercises and would benefit from continued PT      Plan: Continue per plan of care. Potential discharge next visit. Cut off scores: All data taken from APTA Neuro Section or Rehab Measures    Lennon: <46/56=falls risk  MDC: 6 pts  Age Norms:  57-79: M - 54   F - 55  70-79: M - 47   F - 53  80-89: M - 48   F - 50 5xSTS: Gail et al 2010  MDC: 2.3 sec  Age Norms:  60-69: 11.1 sec  70-79: 12.6 sec  80-89: 14.8 sec   TUG  MDC: 4.14 sec  Cut off score:  >13.5 sec community dwelling adults  >32.2 sec frail elderly  <20 sec: I for basic transfers  >30: dependent for transfers 10 Meter Walk Test Norms: Yenifer Patel and Kimberly gutierrez 2011  20-29: M - 1.35 m/s   F - 1.34 m/s  30-39: M - 1.43 m/s   F - 1.34 m/s  40-49: M - 1.43 m/s   F - 1.39 m/s  50-59: M - 1.43 m/s   F - 1.31 m/s  60-69: M - 1.34 m/s   F - 1.24 m/s  70-79: M - 1.26 m/s   F - 1.13 m/s  80-89: M - 0.97 m/s   F - 0.94 m/s   FGA  MCID: 4 pts  Geriatrics/community < 22/30 fall risk  Geriatrics/community < 20/30 unexplained falls DGI  MDC: vestibular - 4 pts  MDC: geriatric/community - 3 pts  Falls risk <19/24   6 Minute Walk Test  Age Norms  57-79: M - 1876 ft (571.80 m)  F - 1765 ft (537.98 m)  70-79:  Damian Teixeira - 1729 ft (527.00 m)  F - 1545 ft (470.92 m)  80-89: M - 1368 ft (416.97 m)  F - 1286 ft (391.97 m) mCTSIB  Norm: 20-60 yrs  Eyes open firm: norm sway 0.21-0.48  Eyes closed firm: norm sway 0.48-0.99  Eyes open foam: norm sway 0.38-0.71  Eyes closed foam: norm sway 0.70-2.22       Outcome Measures Initial Eval  7/3/2023 Re-eval   8/2/2023 8/16/2023      5xSTS 17.67 sec 17.2 sec no UE        TUG 9.75 sec B/L UE push off  9.2 sec B/L UE push off       10 meter TBA ft/sec  m/s NT       VICTOR NT/56 NT       FGA 14/30 21/30       mCTSIB  - FTEO (firm)  - FTEC (firm)    - FTEO (foam)  - FTEC (foam)   60 sec erect  15 sec mod sway   TBA sec  TBA sec   60 sec erect  20 sec min-mod   30 sec min   TBA   60 sec erect  30 sec min sway  60 sec erect  30 sec min sway       6MWT TBA meters in  min        ABC 44%  74.8%       DHI NT NT              Precautions: FALLS  Past Medical History:   Diagnosis Date   • CAD (coronary artery disease)    • Cancer ([address])     prostate   • CHF (congestive heart failure) ([address])    • Diabetes mellitus ([address])    • Myocardial infarction ([address])        [facility]  FOTO: NA  POC Expiration: 9/25/2023  Daily Treatment Log:  Date 8/10/2023 8/16/2023 8/18/23 8/21/2023    Visit # 11 12 13 14     Auth         Manual                        There Exer 15' 15' 18' 17'    LAQ 1.5# 2x10 R/L 2# 2x10 R/L      Bridges 2x10  2x10   3" 2x10     Seated HS stretch   15"x3 R/L cues for form, knee ext 15"x3    LTR 5" x10  5" x10   5" x10     HR/TR         Std hip ext  YTB @ ankles 2x10 R/L  YTB @ ankles 2x10 R/L  RTB at knees 2x10 R/L [place] 1x10 R/L     Std hip abd YTB @ ankles 2x10 R/L  YTB @ ankles 2x10 R/L  RTB @ knees 2x10 R/L     Supine hip flexor stretch  20" x3 R/L  20" x3 R/L   20" x3 R/L     palloff press         Hip flexor stretch stand foot on step        HIWOT vs counter        recumb bike L3x5'   L3x5' L3x5'    Seated leg press B/L 40# 2x10  Uni 20# 1x10 R/L  B/L 60# 2x10  Uni 30# 2x10 R/L B/L 60# 2x10  Uni 30# 2x10 R/L    objective measures         HEP    Updated and discussed     There Activ                                                        NMReed 25' 25' 22' 23'    FTEO foam  FT w/ head turns 1x10 R/L on foam FT w/ head turns 1x10 R/L on foam: erect horiz HT 2x15 R/L upright posture, min sway horiz HT 2x15 R/L upright posture, min sway    FTEC firm  Stance on foam EC 30" x2  Stance on foam EC 30" x2 min way  Stance on foam EC 30"x2  Min sway Stance on foam EC 30"x2  Min sway    Tandem walking  15'x4 CS open space  15'x 4 CS open space     Side stepping   On one foam BB x2 ea way  On 1 foam beam 3 laps R/L no UE, 1 instance of CG req for post LOB     FSU No rail, step to pattern 8" step 1x10 R/L CS No rail, step to pattern 8" step 1x10 R/L DS No rail 8" step 1x10 R/L w/ fatigue by end of set No rail, step to pattern 8" step 1x10 R/L DS    LSU   Straddle step up 6" step 1x10 R/L DS  Straddle step-up 6" step 1x10 R/L Straddle step-up 8" step 1x10 R/L  w/ fatigue by end of set    Cone  from ground    5 cones, 3 standing 2 lying     WB  AP/ML x20 ea. A/P taps no UE 20x     TUG, FGA, 5xSTS         Modalities                                HEP:   Access Code: [email or web address]  URL: [email or web address]  Date: 08/02/2023  Prepared by: Rita Morataya    Exercises  - Modified Alexandre Stretch  - 1 x daily - 7 x weekly - 3 reps - 20 sec  hold  - Supine Bridge  - 1 x daily - 7 x weekly - 2 sets - 10 reps  - Seated Lumbar Flexion Stretch  - 1-2 x daily - 7 x weekly - 1 sets - 10 reps  - Narrow Stance with Head Rotations and Counter Support  - 1 x daily - 7 x weekly - 2 reps - 20-30 hold  - Tandem Walking with Counter Support  - 1 x daily - 7 x weekly - 1 sets - 10 reps  - Standing Lumbar Extension with Counter  - 1-2 x daily - 7 x weekly - 1 sets - 10 reps  - Standing March with Counter Support  - 1 x daily - 7 x weekly - 2 sets - 10 reps  - Sit to Stand with Counter Support  - 1 x daily - 7 x weekly - 2 sets - 10 reps

## 2023-08-23 ENCOUNTER — OFFICE VISIT (OUTPATIENT)
Dept: PHYSICAL THERAPY | Facility: CLINIC | Age: 78
End: 2023-08-23
Payer: COMMERCIAL

## 2023-08-23 DIAGNOSIS — R26.89 IMBALANCE: ICD-10-CM

## 2023-08-23 DIAGNOSIS — R26.9 NEUROLOGIC GAIT DYSFUNCTION: Primary | ICD-10-CM

## 2023-08-23 PROCEDURE — 97112 NEUROMUSCULAR REEDUCATION: CPT

## 2023-08-23 PROCEDURE — 97110 THERAPEUTIC EXERCISES: CPT

## 2023-08-23 NOTE — PROGRESS NOTES
PT Discharge    Today's date: 2023  Patient name: Anh Lennon  : 1945  MRN: [id number]  Referring provider: Jen Banks DO  Dx:   Encounter Diagnosis     ICD-10-CM    1. 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 Imbalance  R26.89                      Assessment  Assessment details: Anh Lennon is a 78 y.o. male who presents with improvements in balance testing including FGA, mCTSIB, and ABC. Rondi Long FGA indicates patient is no longer a risk for falls at this time. Patient demo decrease in reliance on visual systems with improvements in FTEC and walking with eyes closed. Patient has been educated in home exercise program and plan to D/C at this time Patient is appropriate to D/C and was to call in the near future if he has any concerns with balance or HEP.    Understanding of Dx/Px/POC: good   Prognosis: good    Goals  Short term goals to be achieved by 4 weeks:   STG1: Patient will be independent with simple HEP to maximize progress between therapy sessions ---MET  STG2: Patient will improve FGA score by 4 points per [facility] to promote improved safety with dynamic tasks ---MET  STG3: Patient will performed FTEO on foam with </= mild sway for 30 sec to demo improved balance --MET  STG4: Patient will perform FTEC on firm with </= mild sway for 30 sec to demo improved balance ---MET  STG5: Patient will demo tandem walking of 4 steps to improved FGA score and demo improved balance ---MET      Long Term Goals to be achieved by 12 weeks:   LTG1: Patient will be independent with updated HEP to maximize progress between therapy sessions ---MET  LTG2: Patient will score low risk for falls with FGA test with score of 23/30 or higher per current research data ---MET  LTG3: Patient will demo FTEC on foam with </= mild sway for 30 sec to demo improved static balance ---MET  LTG4: Patient will demo tandem walking of 10 steps to improve FGA score and demo improved dynamic balance ---MET  LTG5: Patient will improve 5xSTS to 13 sec or less to demo decreased risk for falls, and improved functional mobility ---Not Met however did not worsen        Plan  Plan details: Patient to D/C to HEP  Frequency: 2x week  Plan of Care beginning date: 7/3/2023  Plan of Care expiration date: 2023  Treatment plan discussed with: patient        Subjective Evaluation    History of Present Illness  Mechanism of injury: Patient reports to PT for re-evaluation of imbalance and gait disturbances. Patient reports he is feeling more confident with balance which was noted on ABC. Patient feels ready to D/C to HEP. Pain  At best pain ratin  At worst pain ratin  Location: low back pain   Quality: dull ache  Relieving factors: change in position and rest  Aggravating factors: walking and stair climbing    Social Support  Steps to enter house: yes  3  Stairs in house: no   Lives in: [facility]  Lives with: alone    Treatments  No previous or current treatments        Objective      LE Strength: MMT      R  L  Hip flexion:    4+/5  4+/5  Hip abduction:   4+/5  4+/5  Knee extension:  4+/5  4+/5  Knee flexion:    4+/5  4+/5  Ankle dorsiflexion:  4+/5  4+/5  Ankle plantarflexion:   4+/5  4+/5      Flexibility:  Hamstrings: min increase in muscle tension  Hip flexors: min increase in muscle tension     Function:  Gait: Patient ambulating with decrease in forward trunk lean compared to IE, increased step length and gait speed. Stairs: Patient able to ascend 8" step with no UE however CS provided for safety.      Sensation: patient has diagnosed neuropathy B/L   7/3/2023(IE)   DERMATOMAL TESTING:          L2 anterior thigh: intact B/L     L3 medial knee:  intact B/L   L4 medial maleolus:  absent B/L   L5 1st web space:   absent B/L   S1 lateral/sole foot:  impaired on RLE absent on LLE  S2 popliteal fossa    intact B/L         Coordination  2023:  - Dysmetria: intact for speed and accuracy   - Dysdiadochokinesia: intact for speed and accuracy       Transfers:   8/23/2023: able to perform STS and supine to/from siting with nil difficulty. Precautions: FALLS  Past Medical History:   Diagnosis Date   • CAD (coronary artery disease)    • Cancer ([address])     prostate   • CHF (congestive heart failure) (HCC)    • Diabetes mellitus ([address])    • Myocardial infarction ([address])    Cut off scores: All data taken from [employer] [facility] or Rehab Measures    Victor: <46/56=falls risk  MDC: 6 pts  Age Norms:  57-79: M - 54   F - 55  70-79: M - 47   F - 53  80-89: M - 48   F - 50 5xSTS: Gial et al 2010  MDC: 2.3 sec  Age Norms:  60-69: 11.1 sec  70-79: 12.6 sec  80-89: 14.8 sec   TUG  MDC: 4.14 sec  Cut off score:  >13.5 sec community dwelling adults  >32.2 sec frail elderly  <20 sec:  I for basic transfers  >30: dependent for transfers 10 Meter Walk Test Norms: Clara Lopez and Danielle Norwood al 2011  20-29: M - 1.35 m/s   F - 1.34 m/s  30-39: M - 1.43 m/s   F - 1.34 m/s  40-49: M - 1.43 m/s   F - 1.39 m/s  50-59: M - 1.43 m/s   F - 1.31 m/s  60-69: M - 1.34 m/s   F - 1.24 m/s  70-79: M - 1.26 m/s   F - 1.13 m/s  80-89: M - 0.97 m/s   F - 0.94 m/s   FGA  MCID: 4 pts  Geriatrics/community < 22/30 fall risk  Geriatrics/community < 20/30 unexplained falls DGI  MDC: vestibular - 4 pts  MDC: geriatric/community - 3 pts  Falls risk <19/24   6 Minute Walk Test  Age Norms  57-79: M - 1876 ft (571.80 m)  F - 1765 ft (537.98 m)  70-79: M - 1729 ft (527.00 m)  F - 1545 ft (470.92 m)  80-89: M - 1368 ft (416.97 m)  F - 1286 ft (391.97 m) mCTSIB  Norm: 20-60 yrs  Eyes open firm: norm sway 0.21-0.48  Eyes closed firm: norm sway 0.48-0.99  Eyes open foam: norm sway 0.38-0.71  Eyes closed foam: norm sway 0.70-2.22       Outcome Measures Initial Eval  7/3/2023 Re-eval   8/2/2023 8/16/2023 Re-eval   8/23/2023     5xSTS 17.67 sec 17.2 sec no UE   17.6 sec     TUG 9.75 sec B/L UE push off  9.2 sec B/L UE push off  NT WNL last re-eval      10 meter TBA ft/sec  m/s NT  NT     VICTOR NT/56 NT  NT     FGA 14/30 21/30       mCTSIB  - FTEO (firm)  - FTEC (firm)    - FTEO (foam)  - FTEC (foam)   60 sec erect  15 sec mod sway   TBA sec  TBA sec   60 sec erect  20 sec min-mod   30 sec min   TBA   60 sec erect  30 sec min sway  60 sec erect  30 sec min sway  See previous box for most recent testing      6MWT TBA meters in  min        ABC 44%  74.8%  81.25%      DHI NT NT              Precautions: FALLS  Past Medical History:   Diagnosis Date   • CAD (coronary artery disease)    • Cancer ([address])     prostate   • CHF (congestive heart failure) ([address])    • Diabetes mellitus ([address])    • Myocardial infarction ([address])        [facility]: 7/3/2023  FOTO: NA  POC Expiration: 9/25/2023  Daily Treatment Log:  Date 8/10/2023 8/16/2023 8/18/23 8/21/2023 8/23/2023   Visit # 11 12 13 14  15 (RE/ABC)    Auth         Manual                        There Exer 15' 15' 18' 17' 15'   LAQ 1.5# 2x10 R/L 2# 2x10 R/L      Bridges 2x10  2x10   3" 2x10  3" 2x10    Seated HS stretch   15"x3 R/L cues for form, knee ext 15"x3 15"x3   LTR 5" x10  5" x10   5" x10  5" x10    HR/TR         Std hip ext  YTB @ ankles 2x10 R/L  YTB @ ankles 2x10 R/L  RTB at knees 2x10 R/L Steamboats 1x10 R/L  Steamboats RTB at shin 1x10 R/L   Std hip abd YTB @ ankles 2x10 R/L  YTB @ ankles 2x10 R/L  RTB @ knees 2x10 R/L     Supine hip flexor stretch  20" x3 R/L  20" x3 R/L   20" x3 R/L  20" x3 R/L    palloff press         Hip flexor stretch stand foot on step        HIWOT vs counter        recumb bike L3x5'   L3x5' L3x5' L3x5'    Seated leg press B/L 40# 2x10  Uni 20# 1x10 R/L  B/L 60# 2x10  Uni 30# 2x10 R/L B/L 60# 2x10  Uni 30# 2x10 R/L    objective measures      EG   HEP    Updated and discussed  discussed    There Activ                                                        NMReed 25' 25' 22' 23' 23'    FTEO foam  FT w/ head turns 1x10 R/L on foam FT w/ head turns 1x10 R/L on foam: erect horiz HT 2x15 R/L upright posture, min sway horiz HT 2x15 R/L upright posture, min sway    FTEC firm  Stance on foam EC 30" x2  Stance on foam EC 30" x2 min way  Stance on foam EC 30"x2  Min sway Stance on foam EC 30"x2  Min sway    Tandem walking  15'x4 CS open space  15'x 4 CS open space     Side stepping   On one foam BB x2 ea way  On 1 foam beam 3 laps R/L no UE, 1 instance of CG req for post LOB     FSU No rail, step to pattern 8" step 1x10 R/L CS No rail, step to pattern 8" step 1x10 R/L DS No rail 8" step 1x10 R/L w/ fatigue by end of set No rail, step to pattern 8" step 1x10 R/L DS No rail, step to pattern 8" step 1x10 R/L DS   LSU   Straddle step up 6" step 1x10 R/L DS  Straddle step-up 6" step 1x10 R/L Straddle step-up 8" step 1x10 R/L  w/ fatigue by end of set Straddle step-up 8" step 1x10 R/L  w/ fatigue by end of set   Cone  from ground    5 cones, 3 standing 2 lying     WB  AP/ML x20 ea. A/P taps no UE 20x     TUG, FGA, 5xSTS      EG   Modalities                                HEP:   Access Code: [email or web address]  URL: [email or web address]  Date: 08/02/2023  Prepared by: Jahaira Hoskins    Exercises  - Modified Alexandre Stretch  - 1 x daily - 7 x weekly - 3 reps - 20 sec  hold  - Supine Bridge  - 1 x daily - 7 x weekly - 2 sets - 10 reps  - Seated Lumbar Flexion Stretch  - 1-2 x daily - 7 x weekly - 1 sets - 10 reps  - Narrow Stance with Head Rotations and Counter Support  - 1 x daily - 7 x weekly - 2 reps - 20-30 hold  - Tandem Walking with Counter Support  - 1 x daily - 7 x weekly - 1 sets - 10 reps  - Standing Lumbar Extension with Counter  - 1-2 x daily - 7 x weekly - 1 sets - 10 reps  - Standing March with Counter Support  - 1 x daily - 7 x weekly - 2 sets - 10 reps  - Sit to Stand with Counter Support  - 1 x daily - 7 x weekly - 2 sets - 10 reps

## 2023-09-01 ENCOUNTER — TELEPHONE (OUTPATIENT)
Dept: NEPHROLOGY | Facility: CLINIC | Age: 78
End: 2023-09-01

## 2023-09-01 NOTE — TELEPHONE ENCOUNTER
Spoke with patient and he hasn't been taking his blood pressure. Asked him to track it for the next few days and let us know. He expressed understanding and thanked us for the call:    ----- Message from Azael Hopkins MD sent at 9/1/2023  9:44 AM EDT -----  Please check if patient has updated BP readings for our review.

## 2023-09-12 ENCOUNTER — OFFICE VISIT (OUTPATIENT)
Dept: PODIATRY | Facility: CLINIC | Age: 78
End: 2023-09-12
Payer: COMMERCIAL

## 2023-09-12 VITALS — RESPIRATION RATE: 16 BRPM | WEIGHT: 238 LBS | BODY MASS INDEX: 30.54 KG/M2 | HEIGHT: 74 IN

## 2023-09-12 DIAGNOSIS — E11.42 DIABETIC POLYNEUROPATHY ASSOCIATED WITH TYPE 2 DIABETES MELLITUS (HCC): Primary | ICD-10-CM

## 2023-09-12 DIAGNOSIS — L03.115 CELLULITIS OF RIGHT FOOT: ICD-10-CM

## 2023-09-12 DIAGNOSIS — I70.209 PERIPHERAL ARTERIOSCLEROSIS (HCC): ICD-10-CM

## 2023-09-12 PROCEDURE — 99213 OFFICE O/P EST LOW 20 MIN: CPT | Performed by: PODIATRIST

## 2023-09-12 RX ORDER — AZITHROMYCIN 250 MG/1
TABLET, FILM COATED ORAL
Qty: 6 TABLET | Refills: 0 | Status: SHIPPED | OUTPATIENT
Start: 2023-09-12 | End: 2023-09-16

## 2023-09-12 NOTE — PROGRESS NOTES
Assessment/Plan: Right heel. History of foreign body with secondary cicatrix. Rule out cellulitis right foot. Diabetic patient with peripheral artery disease. Diabetic neuropathy. Plan. Chart reviewed. Patient examined. Patient advised on condition. At this time patient be started on oral antibiotic. Arterial Doppler testing ordered. Patient advised on aftercare. Return for follow-up. Diagnoses and all orders for this visit:    Diabetic polyneuropathy associated with type 2 diabetes mellitus ([address])    Cellulitis of right foot  -     azithromycin (ZITHROMAX) 250 mg tablet; Take 2 tablets today then 1 tablet daily x 4 days    Peripheral arteriosclerosis (HCC)  -     VAS lower limb arterial duplex, complete bilateral; Future          Subjective: Patient has severe pain of right foot. Patient has history of foreign body of the right foot several months prior. This required hospitalization for cellulitis. He has pain again. Allergies   Allergen Reactions   • Lisinopril Rash and Lip Swelling         Current Outpatient Medications:   •  azithromycin (ZITHROMAX) 250 mg tablet, Take 2 tablets today then 1 tablet daily x 4 days, Disp: 6 tablet, Rfl: 0  •  amLODIPine (NORVASC) 10 mg tablet, Take 0.5 tablets (5 mg total) by mouth daily, Disp: 1 tablet, Rfl: 1  •  aspirin 81 mg chewable tablet, Chew 81 mg daily, Disp: , Rfl:   •  atorvastatin (LIPITOR) 40 mg tablet, Take 1 tablet (40 mg total) by mouth daily, Disp: 90 tablet, Rfl: 3  •  Blood Glucose Monitoring Suppl (OneTouch Verio Reflect) w/Device KIT, Check blood sugars twice daily. Please substitute with appropriate alternative as covered by patient's insurance.  Dx: E11.65, Disp: 1 kit, Rfl: 0  •  cadexomer iodine (IODOSORB) 0.9 % gel, Apply 1 application topically daily as needed for wound care (Patient not taking: Reported on 6/29/2023), Disp: 10 g, Rfl: 0  •  ciclopirox (LOPROX) 0.77 % cream, Apply topically 2 (two) times a day for 20 days, Disp: 90 g, Rfl: 2  •  cloNIDine (CATAPRES) 0.1 mg tablet, Take 1 tablet (0.1 mg total) by mouth every 12 (twelve) hours, Disp: 180 tablet, Rfl: 1  •  clopidogrel (PLAVIX) 75 mg tablet, Take 1 tablet (75 mg total) by mouth daily, Disp: 90 tablet, Rfl: 1  •  fenofibrate 160 MG tablet, Take 1 tablet (160 mg total) by mouth daily, Disp: 90 tablet, Rfl: 3  •  gabapentin (NEURONTIN) 600 MG tablet, TAKE 1 TABLET BY MOUTH  TWICE DAILY, Disp: 180 tablet, Rfl: 3  •  glimepiride (AMARYL) 2 mg tablet, Take 1 tablet (2 mg total) by mouth 2 (two) times a day, Disp: 180 tablet, Rfl: 1  •  glucose blood (OneTouch Verio) test strip, Check blood sugars twice daily. Please substitute with appropriate alternative as covered by patient's insurance. Dx: E11.65, Disp: 200 each, Rfl: 3  •  Insulin Glargine Solostar (Lantus SoloStar) 100 UNIT/ML SOPN, Inject 0.15 mL (15 Units total) under the skin every evening, Disp: 15 mL, Rfl: 0  •  Insulin Pen Needle 32G X 4 MM MISC, Use every evening, Disp: 100 each, Rfl: 5  •  isosorbide mononitrate (IMDUR) 60 mg 24 hr tablet, Take 1 tablet (60 mg total) by mouth daily, Disp: 90 tablet, Rfl: 3  •  Jardiance 25 MG TABS, TAKE 1 TABLET BY MOUTH IN  THE MORNING, Disp: 90 tablet, Rfl: 3  •  metFORMIN (GLUCOPHAGE) 500 mg tablet, TAKE 1 TABLET BY MOUTH  TWICE DAILY WITH MEALS, Disp: 180 tablet, Rfl: 3  •  metoprolol tartrate (LOPRESSOR) 50 mg tablet, Take 1 tablet (50 mg total) by mouth every 12 (twelve) hours, Disp: 180 tablet, Rfl: 3  •  Multiple Vitamins-Minerals (MULTIVITAMIN MEN 50+ PO), Take by mouth daily, Disp: , Rfl:   •  nitroglycerin (NITROSTAT) 0.4 mg SL tablet, Place 1 tablet (0.4 mg total) under the tongue every 5 (five) minutes as needed for chest pain, Disp: 30 tablet, Rfl: 3  •  Omega-3 Fatty Acids (fish oil) 1,000 mg, Take 4,000 mg by mouth daily , Disp: , Rfl:   •  OneTouch Delica Lancets 82Y MISC, Check blood sugars twice daily.  Please substitute with appropriate alternative as covered by patient's insurance. Dx: E11.65, Disp: 200 each, Rfl: 3  •  ranolazine (RANEXA) 500 mg 12 hr tablet, take 1 tablet by mouth twice a day, Disp: 60 tablet, Rfl: 3  •  sitaGLIPtin (JANUVIA) 100 mg tablet, Take 100 mg by mouth daily, Disp: , Rfl:   •  torsemide (DEMADEX) 20 mg tablet, Take 1 tablet (20 mg total) by mouth daily, Disp: 90 tablet, Rfl: 3    Patient Active Problem List   Diagnosis   • Mixed hyperlipidemia   • Benign hypertension with CKD (chronic kidney disease) stage III ([facility])   • Coronary artery disease involving native coronary artery of native heart without angina pectoris   • S/P angioplasty with stent   • Hx of CABG   • S/P AAA repair   • S/P prostatectomy   • H/O prostate cancer   • Type 2 diabetes mellitus with diabetic polyneuropathy, with long-term current use of insulin ([facility])   • Varicose veins of left lower extremity   • History of endovascular stent graft for abdominal aortic aneurysm (AAA)   • Bruit (arterial)   • Peripheral artery disease (HCC)   • Type 2 diabetes mellitus with diabetic peripheral angiopathy without gangrene, with long-term current use of insulin ([facility])   • Actinic keratoses   • Stage 3b chronic kidney disease ([address])   • Platelets decreased ([address])   • Gross hematuria   • Chronic systolic heart failure ([facility])   • Mild aortic stenosis   • Chronic kidney disease-mineral and bone disorder   • Stable angina pectoris ([address])   • Hypertensive urgency   • Elevated troponin level not due myocardial infarction   • Diabetic ulcer of right midfoot associated with type 2 diabetes mellitus, limited to breakdown of skin ([address])   • Acute on chronic diastolic heart failure ([address])   • Frequent PVCs   • Nausea          Patient ID: Samia Greco is a 78 y.o. male. HPI    The following portions of the patient's history were reviewed and updated as appropriate:     family history includes Alcohol abuse in his father; Diabetes in his mother. reports that he quit smoking about 35 years ago.  His smoking use included cigarettes. He started smoking about 67 years ago. He has a 49.50 pack-year smoking history. He has never used smokeless tobacco. He reports current alcohol use of about 14.0 standard drinks of alcohol per week. He reports that he does not use drugs. Vitals:    09/12/23 0948   Resp: 16       Review of Systems      Objective:  Patient's shoes and socks removed. Foot ExamPhysical Exam        General  General Appearance: appears stated age and healthy   Orientation: alert and oriented to person, place, and time   Affect: appropriate   Gait: antalgic         Right Foot/Ankle      Inspection and Palpation  Tenderness: metatarsals   Swelling: none   Arch: pes planus  Claw Toes: fifth toe  Hallux limitus: yes  Skin Exam: callus, dry skin and tinea;      Neurovascular  Dorsalis pedis: 1+  Posterior tibial: 1+  Saphenous nerve sensation: absent  Tibial nerve sensation: absent  Superficial peroneal nerve sensation: absent  Deep peroneal nerve sensation: absent  Sural nerve sensation: absent        Left Foot/Ankle       Inspection and Palpation  Tenderness: metatarsals   Swelling: none   Arch: pes planus  Claw toes: second toe and fifth toe  Hallux limitus: yes  Skin Exam: callus, dry skin and tinea;      Neurovascular  Dorsalis pedis: 1+  Posterior tibial: 1+  Saphenous nerve sensation: absent  Tibial nerve sensation: absent  Superficial peroneal nerve sensation: absent  Deep peroneal nerve sensation: absent  Sural nerve sensation: absent           Physical Exam  Vitals signs and nursing note reviewed. Cardiovascular:      Rate and Rhythm: Normal rate and regular rhythm.      Pulses:           Dorsalis pedis pulses are 1+ on the right side and 1+ on the left side.        Posterior tibial pulses are 1+ on the right side and 1+ on the left side. Feet:      Right foot:      Skin integrity: Callus and dry skin present.      Left foot:      Skin integrity: Callus and dry skin present. Skin:     Capillary Refill: Capillary refill takes 2 to 3 seconds.      Comments:   All nails are thick and mycotic.  Patient demonstrates bilateral dermatophytosis. Lisset Quijano is a pre ulcerative/ corn on the plantar aspect 2nd left toe.    Right Foot/Ankle   Right Foot Inspection  Skin Exam: dry skin, callus and callus                                 Vascular     The right DP pulse is 1+. The right PT pulse is 1+. Right Toe  - Comprehensive Exam  Arch: pes planus  Claw Toes: fifth toe  Hallux limitus: yes  Swelling: none   Tenderness: metatarsals            Left Foot/Ankle  Left Foot Inspection  Skin Exam: dry skin and callus                                             Vascular     The left DP pulse is 1+. The left PT pulse is 1+. Left Toe  - Comprehensive Exam  Arch: pes planus  Claw toes: second toe and fifth toe  Hallux limitus: yes  Swelling: none   Tenderness: metatarsals      Patient's shoes and socks removed. Assign Risk Category:  Deformity present; Loss of protective sensation; Weak pulses       Risk: 2        Patient has profound dermatophytosis and xerosis of the foot bilateral. Lisset uQijano is a fissure on the plantar lateral aspect of the right foot.  Secondary cellulitis.  It is a Sumner grade 0 type ulcer.  Negative pus.  Negative sinus or undermining.

## 2023-09-20 ENCOUNTER — HOSPITAL ENCOUNTER (OUTPATIENT)
Dept: VASCULAR ULTRASOUND | Facility: HOSPITAL | Age: 78
Discharge: HOME | End: 2023-09-20
Payer: COMMERCIAL

## 2023-09-20 DIAGNOSIS — I70.209 PERIPHERAL ARTERIOSCLEROSIS (HCC): ICD-10-CM

## 2023-09-20 PROCEDURE — 93925 LOWER EXTREMITY STUDY: CPT

## 2023-09-20 PROCEDURE — 93925 LOWER EXTREMITY STUDY: CPT | Performed by: SURGERY

## 2023-09-20 PROCEDURE — 93923 UPR/LXTR ART STDY 3+ LVLS: CPT | Performed by: SURGERY

## 2023-09-20 PROCEDURE — 93923 UPR/LXTR ART STDY 3+ LVLS: CPT

## 2023-09-21 ENCOUNTER — TELEPHONE (OUTPATIENT)
Dept: OTHER | Facility: HOSPITAL | Age: 78
End: 2023-09-21

## 2023-09-21 ENCOUNTER — TELEPHONE (OUTPATIENT)
Dept: PODIATRY | Facility: CLINIC | Age: 78
End: 2023-09-21

## 2023-09-21 DIAGNOSIS — E11.51 DIABETES MELLITUS TYPE 2 WITH PERIPHERAL ARTERY DISEASE (HCC): Primary | ICD-10-CM

## 2023-09-21 NOTE — TELEPHONE ENCOUNTER
Patient advised of arterial Doppler testing results. He is being referred urgently to vascular surgery. Patient may need Trental.  Present for office visit.

## 2023-09-22 ENCOUNTER — OFFICE VISIT (OUTPATIENT)
Dept: PODIATRY | Facility: CLINIC | Age: 78
End: 2023-09-22
Payer: COMMERCIAL

## 2023-09-22 VITALS — HEIGHT: 74 IN | RESPIRATION RATE: 17 BRPM | WEIGHT: 238 LBS | BODY MASS INDEX: 30.54 KG/M2

## 2023-09-22 DIAGNOSIS — I70.209 PERIPHERAL ARTERIOSCLEROSIS (HCC): ICD-10-CM

## 2023-09-22 DIAGNOSIS — M21.961 ACQUIRED DEFORMITY OF RIGHT FOOT: ICD-10-CM

## 2023-09-22 DIAGNOSIS — L97.411 DIABETIC ULCER OF RIGHT MIDFOOT ASSOCIATED WITH TYPE 2 DIABETES MELLITUS, LIMITED TO BREAKDOWN OF SKIN (HCC): Primary | ICD-10-CM

## 2023-09-22 DIAGNOSIS — B35.1 ONYCHOMYCOSIS: ICD-10-CM

## 2023-09-22 DIAGNOSIS — E11.621 DIABETIC ULCER OF RIGHT MIDFOOT ASSOCIATED WITH TYPE 2 DIABETES MELLITUS, LIMITED TO BREAKDOWN OF SKIN (HCC): Primary | ICD-10-CM

## 2023-09-22 DIAGNOSIS — I73.9 CLAUDICATION OF RIGHT LOWER EXTREMITY (HCC): ICD-10-CM

## 2023-09-22 DIAGNOSIS — E11.42 DIABETIC POLYNEUROPATHY ASSOCIATED WITH TYPE 2 DIABETES MELLITUS (HCC): ICD-10-CM

## 2023-09-22 PROCEDURE — 99214 OFFICE O/P EST MOD 30 MIN: CPT | Performed by: PODIATRIST

## 2023-09-22 RX ORDER — PENTOXIFYLLINE 400 MG/1
400 TABLET, EXTENDED RELEASE ORAL
Qty: 90 TABLET | Refills: 1 | Status: SHIPPED | OUTPATIENT
Start: 2023-09-22 | End: 2023-10-22

## 2023-09-22 NOTE — PROGRESS NOTES
Assessment/Plan: Sumner grade 0/1 ulcer plantar aspect right heel. Diabetic patient with peripheral artery disease. Intermittent claudication. Mycosis of nail. Pain upon ambulation. Acquired deformity of foot. Plan. Chart reviewed. Vascular studies reviewed. Patient advised on condition. Patient states he is following up with vascular surgery. At this time we will add Trental.  Today wound debrided. Gentian violet dry sterile dressing applied. All mycotic nails debrided without pain or complication. Patient advised on aftercare. Return for follow-up. Watch for signs of infection. Diagnoses and all orders for this visit:    Diabetic ulcer of right midfoot associated with type 2 diabetes mellitus, limited to breakdown of skin ([address])    Peripheral arteriosclerosis ([address])  -     pentoxifylline (TRENtal) 400 mg ER tablet; Take 1 tablet (400 mg total) by mouth 3 (three) times a day with meals    Diabetic polyneuropathy associated with type 2 diabetes mellitus (HCC)    Acquired deformity of right foot    Onychomycosis    Claudication of right lower extremity (HCC)  -     pentoxifylline (TRENtal) 400 mg ER tablet; Take 1 tablet (400 mg total) by mouth 3 (three) times a day with meals          Subjective: Allergies   Allergen Reactions   • Lisinopril Rash and Lip Swelling         Current Outpatient Medications:   •  pentoxifylline (TRENtal) 400 mg ER tablet, Take 1 tablet (400 mg total) by mouth 3 (three) times a day with meals, Disp: 90 tablet, Rfl: 1  •  amLODIPine (NORVASC) 10 mg tablet, Take 0.5 tablets (5 mg total) by mouth daily, Disp: 1 tablet, Rfl: 1  •  aspirin 81 mg chewable tablet, Chew 81 mg daily, Disp: , Rfl:   •  atorvastatin (LIPITOR) 40 mg tablet, Take 1 tablet (40 mg total) by mouth daily, Disp: 90 tablet, Rfl: 3  •  Blood Glucose Monitoring Suppl (OneTouch Verio Reflect) w/Device KIT, Check blood sugars twice daily.  Please substitute with appropriate alternative as covered by patient's insurance. Dx: E11.65, Disp: 1 kit, Rfl: 0  •  cadexomer iodine (IODOSORB) 0.9 % gel, Apply 1 application topically daily as needed for wound care (Patient not taking: Reported on 6/29/2023), Disp: 10 g, Rfl: 0  •  ciclopirox (LOPROX) 0.77 % cream, Apply topically 2 (two) times a day for 20 days, Disp: 90 g, Rfl: 2  •  cloNIDine (CATAPRES) 0.1 mg tablet, Take 1 tablet (0.1 mg total) by mouth every 12 (twelve) hours, Disp: 180 tablet, Rfl: 1  •  clopidogrel (PLAVIX) 75 mg tablet, Take 1 tablet (75 mg total) by mouth daily, Disp: 90 tablet, Rfl: 1  •  fenofibrate 160 MG tablet, Take 1 tablet (160 mg total) by mouth daily, Disp: 90 tablet, Rfl: 3  •  gabapentin (NEURONTIN) 600 MG tablet, TAKE 1 TABLET BY MOUTH  TWICE DAILY, Disp: 180 tablet, Rfl: 3  •  glimepiride (AMARYL) 2 mg tablet, Take 1 tablet (2 mg total) by mouth 2 (two) times a day, Disp: 180 tablet, Rfl: 1  •  glucose blood (OneTouch Verio) test strip, Check blood sugars twice daily. Please substitute with appropriate alternative as covered by patient's insurance.  Dx: E11.65, Disp: 200 each, Rfl: 3  •  Insulin Glargine Solostar (Lantus SoloStar) 100 UNIT/ML SOPN, Inject 0.15 mL (15 Units total) under the skin every evening, Disp: 15 mL, Rfl: 0  •  Insulin Pen Needle 32G X 4 MM MISC, Use every evening, Disp: 100 each, Rfl: 5  •  isosorbide mononitrate (IMDUR) 60 mg 24 hr tablet, Take 1 tablet (60 mg total) by mouth daily, Disp: 90 tablet, Rfl: 3  •  Jardiance 25 MG TABS, TAKE 1 TABLET BY MOUTH IN  THE MORNING, Disp: 90 tablet, Rfl: 3  •  metFORMIN (GLUCOPHAGE) 500 mg tablet, TAKE 1 TABLET BY MOUTH  TWICE DAILY WITH MEALS, Disp: 180 tablet, Rfl: 3  •  metoprolol tartrate (LOPRESSOR) 50 mg tablet, Take 1 tablet (50 mg total) by mouth every 12 (twelve) hours, Disp: 180 tablet, Rfl: 3  •  Multiple Vitamins-Minerals (MULTIVITAMIN MEN 50+ PO), Take by mouth daily, Disp: , Rfl:   •  nitroglycerin (NITROSTAT) 0.4 mg SL tablet, Place 1 tablet (0.4 mg total) under the tongue every 5 (five) minutes as needed for chest pain, Disp: 30 tablet, Rfl: 3  •  Omega-3 Fatty Acids (fish oil) 1,000 mg, Take 4,000 mg by mouth daily , Disp: , Rfl:   •  OneTouch Delica Lancets O MISC, Check blood sugars twice daily. Please substitute with appropriate alternative as covered by patient's insurance.  Dx: E11.65, Disp: 200 each, Rfl: 3  •  ranolazine (RANEXA) 500 mg 12 hr tablet, take 1 tablet by mouth twice a day, Disp: 60 tablet, Rfl: 3  •  sitaGLIPtin (JANUVIA) 100 mg tablet, Take 100 mg by mouth daily, Disp: , Rfl:   •  torsemide (DEMADEX) 20 mg tablet, Take 1 tablet (20 mg total) by mouth daily, Disp: 90 tablet, Rfl: 3    Patient Active Problem List   Diagnosis   • Mixed hyperlipidemia   • Benign hypertension with CKD (chronic kidney disease) stage III ([facility])   • Coronary artery disease involving native coronary artery of native heart without angina pectoris   • S/P angioplasty with stent   • Hx of CABG   • S/P AAA repair   • S/P prostatectomy   • H/O prostate cancer   • Type 2 diabetes mellitus with diabetic polyneuropathy, with long-term current use of insulin ([facility])   • Varicose veins of left lower extremity   • History of endovascular stent graft for abdominal aortic aneurysm (AAA)   • Bruit (arterial)   • Peripheral artery disease ([facility])   • Type 2 diabetes mellitus with diabetic peripheral angiopathy without gangrene, with long-term current use of insulin ([facility])   • Actinic keratoses   • Stage 3b chronic kidney disease ([facility])   • Platelets decreased ([facility])   • Gross hematuria   • Chronic systolic heart failure ([facility])   • Mild aortic stenosis   • Chronic kidney disease-mineral and bone disorder   • Stable angina pectoris ([address])   • Hypertensive urgency   • Elevated troponin level not due myocardial infarction   • Diabetic ulcer of right midfoot associated with type 2 diabetes mellitus, limited to breakdown of skin ([address])   • Acute on chronic diastolic heart failure ([facility])   • Frequent PVCs   • Nausea          Patient ID: Kirti Robles is a 78 y.o. male. HPI    The following portions of the patient's history were reviewed and updated as appropriate:     family history includes Alcohol abuse in his father; Diabetes in his mother. reports that he quit smoking about 35 years ago. His smoking use included cigarettes. He started smoking about 67 years ago. He has a 49.50 pack-year smoking history. He has never used smokeless tobacco. He reports current alcohol use of about 14.0 standard drinks of alcohol per week. He reports that he does not use drugs. Vitals:    09/22/23 1317   Resp: 17       Review of Systems      Objective:  Patient's shoes and socks removed.    Foot ExamPhysical Exam

## 2023-09-26 ENCOUNTER — HOSPITAL ENCOUNTER (OUTPATIENT)
Dept: RADIOLOGY | Facility: HOSPITAL | Age: 78
Discharge: HOME | End: 2023-09-26
Attending: SURGERY

## 2023-09-26 DIAGNOSIS — Z98.890 S/P AAA REPAIR: ICD-10-CM

## 2023-09-26 DIAGNOSIS — Z86.79 S/P AAA REPAIR: ICD-10-CM

## 2023-09-27 ENCOUNTER — TELEPHONE (OUTPATIENT)
Age: 78
End: 2023-09-27

## 2023-09-27 DIAGNOSIS — E11.42 TYPE 2 DIABETES MELLITUS WITH DIABETIC POLYNEUROPATHY, WITHOUT LONG-TERM CURRENT USE OF INSULIN (HCC): ICD-10-CM

## 2023-09-27 RX ORDER — PEN NEEDLE, DIABETIC 32GX 5/32"
NEEDLE, DISPOSABLE MISCELLANEOUS
Qty: 100 EACH | Refills: 5 | Status: SHIPPED | OUTPATIENT
Start: 2023-09-27

## 2023-09-28 ENCOUNTER — HOSPITAL ENCOUNTER (OUTPATIENT)
Dept: RADIOLOGY | Facility: HOSPITAL | Age: 78
Discharge: HOME | End: 2023-09-28
Attending: SURGERY
Payer: COMMERCIAL

## 2023-09-28 PROCEDURE — 93923 UPR/LXTR ART STDY 3+ LVLS: CPT

## 2023-09-28 PROCEDURE — 93978 VASCULAR STUDY: CPT

## 2023-09-29 ENCOUNTER — TELEPHONE (OUTPATIENT)
Dept: NEPHROLOGY | Facility: CLINIC | Age: 78
End: 2023-09-29

## 2023-09-29 PROCEDURE — 93978 VASCULAR STUDY: CPT | Performed by: SURGERY

## 2023-10-04 ENCOUNTER — LAB (OUTPATIENT)
Dept: LAB | Facility: CLINIC | Age: 78
End: 2023-10-04
Payer: COMMERCIAL

## 2023-10-04 DIAGNOSIS — I25.10 CORONARY ARTERY DISEASE INVOLVING NATIVE CORONARY ARTERY OF NATIVE HEART WITHOUT ANGINA PECTORIS: ICD-10-CM

## 2023-10-04 DIAGNOSIS — Z12.5 SCREENING FOR PROSTATE CANCER: ICD-10-CM

## 2023-10-04 DIAGNOSIS — N18.30 BENIGN HYPERTENSION WITH CKD (CHRONIC KIDNEY DISEASE) STAGE III (HCC): ICD-10-CM

## 2023-10-04 DIAGNOSIS — N18.32 STAGE 3B CHRONIC KIDNEY DISEASE (HCC): ICD-10-CM

## 2023-10-04 DIAGNOSIS — I12.9 BENIGN HYPERTENSION WITH CKD (CHRONIC KIDNEY DISEASE) STAGE III (HCC): ICD-10-CM

## 2023-10-04 DIAGNOSIS — E78.2 MIXED HYPERLIPIDEMIA: ICD-10-CM

## 2023-10-04 DIAGNOSIS — L97.411 DIABETIC ULCER OF RIGHT MIDFOOT ASSOCIATED WITH TYPE 2 DIABETES MELLITUS, LIMITED TO BREAKDOWN OF SKIN (HCC): ICD-10-CM

## 2023-10-04 DIAGNOSIS — E11.621 DIABETIC ULCER OF RIGHT MIDFOOT ASSOCIATED WITH TYPE 2 DIABETES MELLITUS, LIMITED TO BREAKDOWN OF SKIN (HCC): ICD-10-CM

## 2023-10-04 LAB
ALBUMIN SERPL BCP-MCNC: 4.1 G/DL (ref 3.5–5)
ALP SERPL-CCNC: 36 U/L (ref 34–104)
ALT SERPL W P-5'-P-CCNC: 21 U/L (ref 7–52)
ANION GAP SERPL CALCULATED.3IONS-SCNC: 8 MMOL/L
AST SERPL W P-5'-P-CCNC: 24 U/L (ref 13–39)
BILIRUB SERPL-MCNC: 0.65 MG/DL (ref 0.2–1)
BUN SERPL-MCNC: 35 MG/DL (ref 5–25)
CALCIUM SERPL-MCNC: 9.4 MG/DL (ref 8.4–10.2)
CHLORIDE SERPL-SCNC: 105 MMOL/L (ref 96–108)
CHOLEST SERPL-MCNC: 119 MG/DL
CO2 SERPL-SCNC: 30 MMOL/L (ref 21–32)
CREAT SERPL-MCNC: 1.63 MG/DL (ref 0.6–1.3)
CREAT UR-MCNC: 71.3 MG/DL
CREAT UR-MCNC: 71.7 MG/DL
ERYTHROCYTE [DISTWIDTH] IN BLOOD BY AUTOMATED COUNT: 14.2 % (ref 11.6–15.1)
EST. AVERAGE GLUCOSE BLD GHB EST-MCNC: 154 MG/DL
GFR SERPL CREATININE-BSD FRML MDRD: 39 ML/MIN/1.73SQ M
GLUCOSE P FAST SERPL-MCNC: 110 MG/DL (ref 65–99)
HBA1C MFR BLD: 7 %
HCT VFR BLD AUTO: 43.9 % (ref 36.5–49.3)
HDLC SERPL-MCNC: 49 MG/DL
HGB BLD-MCNC: 14 G/DL (ref 12–17)
LDLC SERPL CALC-MCNC: 51 MG/DL (ref 0–100)
MAGNESIUM SERPL-MCNC: 2.3 MG/DL (ref 1.9–2.7)
MCH RBC QN AUTO: 28.3 PG (ref 26.8–34.3)
MCHC RBC AUTO-ENTMCNC: 31.9 G/DL (ref 31.4–37.4)
MCV RBC AUTO: 89 FL (ref 82–98)
MICROALBUMIN UR-MCNC: 33.2 MG/L
MICROALBUMIN/CREAT 24H UR: 47 MG/G CREATININE (ref 0–30)
PHOSPHATE SERPL-MCNC: 3.4 MG/DL (ref 2.3–4.1)
PLATELET # BLD AUTO: 197 THOUSANDS/UL (ref 149–390)
PMV BLD AUTO: 11.8 FL (ref 8.9–12.7)
POTASSIUM SERPL-SCNC: 4.4 MMOL/L (ref 3.5–5.3)
PROT SERPL-MCNC: 7.5 G/DL (ref 6.4–8.4)
PROT UR-MCNC: 23 MG/DL
PROT/CREAT UR: 0.32 MG/G{CREAT} (ref 0–0.1)
PSA SERPL-MCNC: <0.01 NG/ML (ref 0–4)
RBC # BLD AUTO: 4.95 MILLION/UL (ref 3.88–5.62)
SODIUM SERPL-SCNC: 143 MMOL/L (ref 135–147)
TRIGL SERPL-MCNC: 93 MG/DL
WBC # BLD AUTO: 6.77 THOUSAND/UL (ref 4.31–10.16)

## 2023-10-04 PROCEDURE — 82570 ASSAY OF URINE CREATININE: CPT

## 2023-10-04 PROCEDURE — 36415 COLL VENOUS BLD VENIPUNCTURE: CPT

## 2023-10-04 PROCEDURE — 80053 COMPREHEN METABOLIC PANEL: CPT

## 2023-10-04 PROCEDURE — 83735 ASSAY OF MAGNESIUM: CPT

## 2023-10-04 PROCEDURE — 80061 LIPID PANEL: CPT

## 2023-10-04 PROCEDURE — G0103 PSA SCREENING: HCPCS

## 2023-10-04 PROCEDURE — 85027 COMPLETE CBC AUTOMATED: CPT

## 2023-10-04 PROCEDURE — 84156 ASSAY OF PROTEIN URINE: CPT

## 2023-10-04 PROCEDURE — 84100 ASSAY OF PHOSPHORUS: CPT

## 2023-10-04 PROCEDURE — 83036 HEMOGLOBIN GLYCOSYLATED A1C: CPT

## 2023-10-04 PROCEDURE — 82043 UR ALBUMIN QUANTITATIVE: CPT

## 2023-10-06 DIAGNOSIS — E11.42 TYPE 2 DIABETES MELLITUS WITH DIABETIC POLYNEUROPATHY, WITH LONG-TERM CURRENT USE OF INSULIN (HCC): Primary | ICD-10-CM

## 2023-10-06 DIAGNOSIS — Z79.4 TYPE 2 DIABETES MELLITUS WITH DIABETIC POLYNEUROPATHY, WITH LONG-TERM CURRENT USE OF INSULIN (HCC): Primary | ICD-10-CM

## 2023-10-06 NOTE — TELEPHONE ENCOUNTER
Reason for call:   [x] Refill   [] Prior Auth  [] Other:     Office:   [x] PCP/Provider -   [] Speciality/Provider -     Medication: Januvia 100 mg Daily       Quantity: 90    Pharmacy: [facility]    Does the patient have enough for 3 days?    [x] Yes   [] No - Send as HP to POD

## 2023-10-09 ENCOUNTER — OFFICE VISIT (OUTPATIENT)
Dept: NEPHROLOGY | Facility: CLINIC | Age: 78
End: 2023-10-09
Payer: COMMERCIAL

## 2023-10-09 VITALS
SYSTOLIC BLOOD PRESSURE: 114 MMHG | HEIGHT: 74 IN | WEIGHT: 240 LBS | DIASTOLIC BLOOD PRESSURE: 64 MMHG | BODY MASS INDEX: 30.8 KG/M2 | OXYGEN SATURATION: 95 % | HEART RATE: 50 BPM

## 2023-10-09 DIAGNOSIS — M89.9 CHRONIC KIDNEY DISEASE-MINERAL AND BONE DISORDER: ICD-10-CM

## 2023-10-09 DIAGNOSIS — N18.32 STAGE 3B CHRONIC KIDNEY DISEASE (HCC): Primary | ICD-10-CM

## 2023-10-09 DIAGNOSIS — E83.9 CHRONIC KIDNEY DISEASE-MINERAL AND BONE DISORDER: ICD-10-CM

## 2023-10-09 DIAGNOSIS — I50.33 ACUTE ON CHRONIC DIASTOLIC HEART FAILURE (HCC): ICD-10-CM

## 2023-10-09 DIAGNOSIS — I12.9 BENIGN HYPERTENSION WITH CKD (CHRONIC KIDNEY DISEASE) STAGE III (HCC): ICD-10-CM

## 2023-10-09 DIAGNOSIS — N18.30 BENIGN HYPERTENSION WITH CKD (CHRONIC KIDNEY DISEASE) STAGE III (HCC): ICD-10-CM

## 2023-10-09 DIAGNOSIS — N18.9 CHRONIC KIDNEY DISEASE-MINERAL AND BONE DISORDER: ICD-10-CM

## 2023-10-09 PROCEDURE — 99214 OFFICE O/P EST MOD 30 MIN: CPT | Performed by: INTERNAL MEDICINE

## 2023-10-09 NOTE — PATIENT INSTRUCTIONS
You have chronic kidney disease (CKD) and your kidney function is stable. I recommend no changes to your medications today. Check BMP in 3 months  Follow up in 6  months - please obtain blood work 1-2 weeks prior to the appointment. Please check your BP twice daily for 1 week and bring a log with your next visit. Please avoid taking NSAIDs (Ibuprofen, Motrin, Aleve, Advil, Naproxen, Celebrex, etc.)  Make sure you are eating healthy and have adequate exercise.

## 2023-10-09 NOTE — PROGRESS NOTES
NEPHROLOGY OFFICE PROGRESS NOTE   Eliza Carter 78 y.o. male MRN: [id number]  DATE: 10/09/23  Reason for visit: Continued evaluation and management of CKD    ASSESSMENT & PLAN:  1. Chronic kidney disease, stage IIIB:  · Thomas's baseline serum creatinine is now around 1.6 to 1.8. · The etiology of his CKD is multifactorial - hypertensive nephrosclerosis, diabetic kidney disease and cardiorenal syndrome. · Renal function stable - SCr 1.63.   · Treatment: good BP control, glycemic control, volume control. 2. Hypertension:  · Home SBP is in the 130s to 140s. · Note that his home machine reads +10 higher than the office BP cuff. · BP is essentially at goal (<130/80)  · Current medications: Torsemide 20 mg OD, Metoprolol tartrate 50 mg BID, Clonidine 0.1 mg twice a day, ISMN 60 mg daily, Amlodipine 5 mg daily. · Losartan 100 mg daily on hold since June 2023 hospitalization. · No changes today. 3. Congestive heart failure:  · Clinically compensated. · Rx: Torsemide 20 mg daily. · No changes. 4. Mineral bone disease:  · PTH is at goal.  20.2 in June 2023. · Calcium and phosphorus are at goal.  · Vitamin D level is at goal.  39.2 in June 2023. · No changes. 5. Proteinuria:  · UPC is at goal - 0.32.   · No compelling need for ARB at this time. · Monitor off Losartan. 6. Diabetes mellitus:  · Management is deferred to PCP. · He is on Jardiance, Januvia, Metformin and Lantus. 7. Hyperlipidemia:  · On atorvastatin and fenofibrate    Patient Instructions   You have chronic kidney disease (CKD) and your kidney function is stable. I recommend no changes to your medications today. Check BMP in 3 months  Follow up in 6  months - please obtain blood work 1-2 weeks prior to the appointment. Please check your BP twice daily for 1 week and bring a log with your next visit.    Please avoid taking NSAIDs (Ibuprofen, Motrin, Aleve, Advil, Naproxen, Celebrex, etc.)  Make sure you are eating healthy and have adequate exercise. SUBJECTIVE / INTERVAL HISTORY:  Aamir Deal was last seen in the office in July 2023. Since his last office visit in July 2023, there have been no unplanned hospitalizations or ER visits. His biggest issue now is his right heel for which he is having pain. He follows with Dr. Jerry Garcia of podiatry. He just had vascular studies and is being referred to vascular surgery. Home SBP when checked after medications have been in the 130s to 140s. He has not checked in the past week. His weight is usually around 232 to 235 lbs. No new acute complaints. BP cuff calibration July 26, 2023:  Home BP cuff 169/77  Office BP cuff 158/80    PMH/PSH: HTN, DM, HLP, CAD s/p CABG 2002, s/p PCI, prostate cancer s/p prostatectomy, AAA s/p EVAR, nephrolithiasis, DDD s/p laminectomy, cholecystectomy    Previous work up:   8/2/22 CT abdomen: 1 or more simple renal cysts. No hydronephrosis. Parenchymal thinning/scar at the R inferior pole. 6/17/23 echocardiogram: Mild to moderate LVH, EF 50%, G2DD    8/8/23 Renin 1.290, Aldosterone 4.3, Metanephrines 21.1, Normetanephrines 95.1. ALLERGIES:   Allergies   Allergen Reactions   • Lisinopril Rash and Lip Swelling     REVIEW OF SYSTEMS:  Review of Systems   Constitutional: Negative for appetite change, chills and fever. Respiratory: Negative for cough and shortness of breath. Cardiovascular: Negative for chest pain and leg swelling. Gastrointestinal: Negative for abdominal pain, diarrhea, nausea and vomiting. Genitourinary: Negative for dysuria and hematuria. Musculoskeletal: Negative for arthralgias. Heel pain. Neurological: Negative for dizziness and light-headedness.      OBJECTIVE:  /64 (BP Location: Left arm, Patient Position: Sitting, Cuff Size: Standard)   Pulse (!) 50   Ht 6' 2" (1.88 m)   Wt 109 kg (240 lb)   SpO2 95%   BMI 30.81 kg/m²   Current Weight: Weight - Scale: 109 kg (240 lb) Body mass index is 30.81 kg/m². Physical Exam  Constitutional:       General: He is not in acute distress. Appearance: Normal appearance. He is well-developed. He is obese. He is not ill-appearing or diaphoretic. HENT:      Head: Normocephalic and atraumatic. Eyes:      General: No scleral icterus. Conjunctiva/sclera: Conjunctivae normal.   Neck:      Vascular: No JVD. Cardiovascular:      Rate and Rhythm: Normal rate and regular rhythm. Heart sounds: Normal heart sounds. Pulmonary:      Effort: Pulmonary effort is normal. No respiratory distress. Breath sounds: Normal breath sounds. Abdominal:      General: Bowel sounds are normal.      Palpations: Abdomen is soft. Musculoskeletal:      Cervical back: Neck supple. Right lower leg: No edema. Left lower leg: No edema. Skin:     General: Skin is warm and dry. Neurological:      Mental Status: He is alert and oriented to person, place, and time.    Psychiatric:         Mood and Affect: Mood normal.         Behavior: Behavior normal.         Judgment: Judgment normal.       Medications:  Current Outpatient Medications:   •  amLODIPine (NORVASC) 10 mg tablet, Take 0.5 tablets (5 mg total) by mouth daily, Disp: 1 tablet, Rfl: 1  •  aspirin 81 mg chewable tablet, Chew 81 mg daily, Disp: , Rfl:   •  atorvastatin (LIPITOR) 40 mg tablet, Take 1 tablet (40 mg total) by mouth daily, Disp: 90 tablet, Rfl: 3  •  cloNIDine (CATAPRES) 0.1 mg tablet, Take 1 tablet (0.1 mg total) by mouth every 12 (twelve) hours, Disp: 180 tablet, Rfl: 1  •  clopidogrel (PLAVIX) 75 mg tablet, Take 1 tablet (75 mg total) by mouth daily, Disp: 90 tablet, Rfl: 1  •  fenofibrate 160 MG tablet, Take 1 tablet (160 mg total) by mouth daily, Disp: 90 tablet, Rfl: 3  •  gabapentin (NEURONTIN) 600 MG tablet, TAKE 1 TABLET BY MOUTH  TWICE DAILY, Disp: 180 tablet, Rfl: 3  •  glimepiride (AMARYL) 2 mg tablet, Take 1 tablet (2 mg total) by mouth 2 (two) times a day, Disp: 180 tablet, Rfl: 1  •  Insulin Glargine Solostar (Lantus SoloStar) 100 UNIT/ML SOPN, Inject 0.15 mL (15 Units total) under the skin every evening, Disp: 15 mL, Rfl: 0  •  isosorbide mononitrate (IMDUR) 60 mg 24 hr tablet, Take 1 tablet (60 mg total) by mouth daily, Disp: 90 tablet, Rfl: 3  •  Jardiance 25 MG TABS, TAKE 1 TABLET BY MOUTH IN THE  MORNING, Disp: 90 tablet, Rfl: 1  •  metFORMIN (GLUCOPHAGE) 500 mg tablet, TAKE 1 TABLET BY MOUTH  TWICE DAILY WITH MEALS, Disp: 180 tablet, Rfl: 3  •  metoprolol tartrate (LOPRESSOR) 50 mg tablet, Take 1 tablet (50 mg total) by mouth every 12 (twelve) hours, Disp: 180 tablet, Rfl: 3  •  Multiple Vitamins-Minerals (MULTIVITAMIN MEN 50+ PO), Take by mouth daily, Disp: , Rfl:   •  Omega-3 Fatty Acids (fish oil) 1,000 mg, Take 4,000 mg by mouth daily , Disp: , Rfl:   •  pentoxifylline (TRENtal) 400 mg ER tablet, Take 1 tablet (400 mg total) by mouth 3 (three) times a day with meals, Disp: 90 tablet, Rfl: 1  •  ranolazine (RANEXA) 500 mg 12 hr tablet, take 1 tablet by mouth twice a day, Disp: 60 tablet, Rfl: 3  •  sitaGLIPtin (JANUVIA) 100 mg tablet, Take 1 tablet (100 mg total) by mouth daily, Disp: 90 tablet, Rfl: 1  •  torsemide (DEMADEX) 20 mg tablet, Take 1 tablet (20 mg total) by mouth daily, Disp: 90 tablet, Rfl: 3  •  Blood Glucose Monitoring Suppl (OneTouch Verio Reflect) w/Device KIT, Check blood sugars twice daily. Please substitute with appropriate alternative as covered by patient's insurance. Dx: E11.65, Disp: 1 kit, Rfl: 0  •  cadexomer iodine (IODOSORB) 0.9 % gel, Apply 1 application topically daily as needed for wound care (Patient not taking: Reported on 6/29/2023), Disp: 10 g, Rfl: 0  •  ciclopirox (LOPROX) 0.77 % cream, Apply topically 2 (two) times a day for 20 days, Disp: 90 g, Rfl: 2  •  Droplet Pen Needles 32G X 4 MM MISC, USE EVERY EVENING, Disp: 100 each, Rfl: 5  •  glucose blood (OneTouch Verio) test strip, Check blood sugars twice daily.  Please substitute with appropriate alternative as covered by patient's insurance. Dx: E11.65, Disp: 200 each, Rfl: 3  •  nitroglycerin (NITROSTAT) 0.4 mg SL tablet, Place 1 tablet (0.4 mg total) under the tongue every 5 (five) minutes as needed for chest pain, Disp: 30 tablet, Rfl: 3  •  OneTouch Delica Lancets 32C MISC, Check blood sugars twice daily. Please substitute with appropriate alternative as covered by patient's insurance. Dx: E11.65, Disp: 200 each, Rfl: 3    Laboratory Results:  Results for orders placed or performed in visit on 10/04/23   Protein / creatinine ratio, urine   Result Value Ref Range    Creatinine, Ur 71.7 Reference range not established. mg/dL    Protein Urine Random 23 Reference range not established. mg/dL    Prot/Creat Ratio, Ur 0.32 (H) 0.00 - 0.10   Albumin / creatinine urine ratio   Result Value Ref Range    Creatinine, Ur 71.3 Reference range not established. mg/dL    Albumin,U,Random 33.2 (H) <20.0 mg/L    Albumin Creat Ratio 47 (H) 0 - 30 mg/g creatinine   Comprehensive metabolic panel   Result Value Ref Range    Sodium 143 135 - 147 mmol/L    Potassium 4.4 3.5 - 5.3 mmol/L    Chloride 105 96 - 108 mmol/L    CO2 30 21 - 32 mmol/L    ANION GAP 8 mmol/L    BUN 35 (H) 5 - 25 mg/dL    Creatinine 1.63 (H) 0.60 - 1.30 mg/dL    Glucose, Fasting 110 (H) 65 - 99 mg/dL    Calcium 9.4 8.4 - 10.2 mg/dL    AST 24 13 - 39 U/L    ALT 21 7 - 52 U/L    Alkaline Phosphatase 36 34 - 104 U/L    Total Protein 7.5 6.4 - 8.4 g/dL    Albumin 4.1 3.5 - 5.0 g/dL    Total Bilirubin 0.65 0.20 - 1.00 mg/dL    eGFR 39 ml/min/1.73sq m   Hemoglobin A1C   Result Value Ref Range    Hemoglobin A1C 7.0 (H) Normal 4.0-5.6%; PreDiabetic 5.7-6.4%;  Diabetic >=6.5%; Glycemic control for adults with diabetes <7.0% %     mg/dl   Lipid Panel with Direct LDL reflex   Result Value Ref Range    Cholesterol 119 See Comment mg/dL    Triglycerides 93 See Comment mg/dL    HDL, Direct 49 >=40 mg/dL    LDL Calculated 51 0 - 100 mg/dL   PSA, Total Screen   Result Value Ref Range    PSA <0.01 0.00 - 4.00 ng/mL   CBC   Result Value Ref Range    WBC 6.77 4.31 - 10.16 Thousand/uL    RBC 4.95 3.88 - 5.62 Million/uL    Hemoglobin 14.0 12.0 - 17.0 g/dL    Hematocrit 43.9 36.5 - 49.3 %    MCV 89 82 - 98 fL    MCH 28.3 26.8 - 34.3 pg    MCHC 31.9 31.4 - 37.4 g/dL    RDW 14.2 11.6 - 15.1 %    Platelets 780 461 - 175 Thousands/uL    MPV 11.8 8.9 - 12.7 fL   Magnesium   Result Value Ref Range    Magnesium 2.3 1.9 - 2.7 mg/dL   Phosphorus   Result Value Ref Range    Phosphorus 3.4 2.3 - 4.1 mg/dL

## 2023-10-17 ENCOUNTER — OFFICE VISIT (OUTPATIENT)
Dept: CARDIOLOGY CLINIC | Facility: CLINIC | Age: 78
End: 2023-10-17
Payer: COMMERCIAL

## 2023-10-17 ENCOUNTER — OFFICE VISIT (OUTPATIENT)
Age: 78
End: 2023-10-17
Payer: COMMERCIAL

## 2023-10-17 VITALS
SYSTOLIC BLOOD PRESSURE: 118 MMHG | WEIGHT: 241 LBS | RESPIRATION RATE: 17 BRPM | BODY MASS INDEX: 30.93 KG/M2 | DIASTOLIC BLOOD PRESSURE: 55 MMHG | HEIGHT: 74 IN | HEART RATE: 52 BPM

## 2023-10-17 VITALS
SYSTOLIC BLOOD PRESSURE: 130 MMHG | WEIGHT: 242 LBS | BODY MASS INDEX: 31.06 KG/M2 | OXYGEN SATURATION: 98 % | HEIGHT: 74 IN | HEART RATE: 56 BPM | DIASTOLIC BLOOD PRESSURE: 60 MMHG

## 2023-10-17 DIAGNOSIS — M21.961 ACQUIRED DEFORMITY OF RIGHT FOOT: ICD-10-CM

## 2023-10-17 DIAGNOSIS — N18.30 BENIGN HYPERTENSION WITH CKD (CHRONIC KIDNEY DISEASE) STAGE III (HCC): ICD-10-CM

## 2023-10-17 DIAGNOSIS — I70.209 PERIPHERAL ARTERIOSCLEROSIS (HCC): ICD-10-CM

## 2023-10-17 DIAGNOSIS — L97.411 DIABETIC ULCER OF RIGHT HEEL ASSOCIATED WITH TYPE 2 DIABETES MELLITUS, LIMITED TO BREAKDOWN OF SKIN (HCC): Primary | ICD-10-CM

## 2023-10-17 DIAGNOSIS — I25.10 CORONARY ARTERY DISEASE INVOLVING NATIVE CORONARY ARTERY OF NATIVE HEART WITHOUT ANGINA PECTORIS: ICD-10-CM

## 2023-10-17 DIAGNOSIS — M86.271 SUBACUTE OSTEOMYELITIS OF RIGHT FOOT (HCC): ICD-10-CM

## 2023-10-17 DIAGNOSIS — E11.42 TYPE 2 DIABETES MELLITUS WITH DIABETIC POLYNEUROPATHY, WITH LONG-TERM CURRENT USE OF INSULIN (HCC): ICD-10-CM

## 2023-10-17 DIAGNOSIS — E11.42 DIABETIC POLYNEUROPATHY ASSOCIATED WITH TYPE 2 DIABETES MELLITUS (HCC): ICD-10-CM

## 2023-10-17 DIAGNOSIS — I12.9 BENIGN HYPERTENSION WITH CKD (CHRONIC KIDNEY DISEASE) STAGE III (HCC): ICD-10-CM

## 2023-10-17 DIAGNOSIS — Z79.4 TYPE 2 DIABETES MELLITUS WITH DIABETIC POLYNEUROPATHY, WITH LONG-TERM CURRENT USE OF INSULIN (HCC): ICD-10-CM

## 2023-10-17 DIAGNOSIS — I50.22 CHRONIC SYSTOLIC HEART FAILURE (HCC): ICD-10-CM

## 2023-10-17 DIAGNOSIS — E11.621 DIABETIC ULCER OF RIGHT HEEL ASSOCIATED WITH TYPE 2 DIABETES MELLITUS, LIMITED TO BREAKDOWN OF SKIN (HCC): Primary | ICD-10-CM

## 2023-10-17 DIAGNOSIS — I35.0 MILD AORTIC STENOSIS: ICD-10-CM

## 2023-10-17 DIAGNOSIS — I20.89 STABLE ANGINA PECTORIS: ICD-10-CM

## 2023-10-17 PROCEDURE — 99214 OFFICE O/P EST MOD 30 MIN: CPT | Performed by: PODIATRIST

## 2023-10-17 PROCEDURE — 99214 OFFICE O/P EST MOD 30 MIN: CPT | Performed by: INTERNAL MEDICINE

## 2023-10-17 NOTE — PROGRESS NOTES
Progress Note - Cardiology Office  [facility]    Samia Greco 78 y.o. male MRN: [id number]  : 1945  Encounter: [id number]      Assessment:     1. Coronary artery disease involving native coronary artery of native heart without angina pectoris    2. Benign hypertension with CKD (chronic kidney disease) stage III ([facility])    3. Stable angina pectoris    4. Chronic systolic heart failure ([address])    5. Mild aortic stenosis    6. Type 2 diabetes mellitus with diabetic polyneuropathy, with long-term current use of insulin ([address])        Discussion Summary and Plan:    1. Coronary artery disease postcoronary artery bypass surgery with three-vessel bypass patient with history of three-vessel bypass. His last cath in 2022 shows only patent LIMA to LAD. Proximal % occluded, proximal % occluded. Vein graft to % occluded. Obtuse marginal 2. 100% occluded. Vein graft to OM 2, 100% occluded. AV groove circumflex is widely patent which feeds posterior lateral branches. Patient has grade 1 to grade 2 collaterals to right circulation from the left circulation. EF is 50%. Aspirin and Plavix for 6-month then he will be only on aspirin. Exertional shortness of breath is better. 2.  Essential hypertension. Patient also follow-up with nephrology. Currently is taking amlodipine 10 mg daily, hydrochlorothiazide 12.5, Jardiance 25, losartan 1 mg daily, Toprol-XL 50 mg daily and clonidine patch 0.1 every 12 hourly along with Imdur. Blood pressure acceptable. 3.  Chronic stable angina. Currently he get exertional symptoms only. Continue aspirin and Plavix along with Imdur and Ranexa. Symptoms of angina has improved. Feels well    4. Diabetes mellitus. On multiple medications. Management as per medical team.    5.  Mixed hyperlipidemia. Continue statin and fenofibrate cholesterol profile acceptable. Cresta prophylaxis have improved.     6.  S/p AAA repair management as per vascular    7. CKD stage III. John Zacarias Serum creatinine has been stable. He follows with nephrology. Creatinine now 1.6-2.0.    8.  Obesity with a BMI around 32 encouraged him to lose weight. 9.  Obstructive sleep apnea. Patient has history of obstructive sleep apnea could not tolerate CPAP machine does not want to use it. 10.  Pulmonary hypertension. 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 He is asymptomatic does not use CPAP machine we will continue to monitor. Continue current Rx follow-up 6 months. Patient  was advised and educated to call our office  immediately if  patient has any new symptoms of chest pain/shortness of breath, near-syncope, syncope, light headedness sustained palpitations  or any other cardiovascular symptoms before their scheduled follow-up appointment. Office number was provided #[phone]. Please call [phone] if any questions. Counseling :  A description of the counseling. Goals and Barriers. Patient's ability to self care: Yes  Medication side effect reviewed with patient in detail and all their questions answered to their satisfaction. HPI :     Negra Burris is a 78y.o. year old male who came for follow up. Patient has medical history significant for coronary artery disease s/p CABG in 2003 with three-vessel bypass which was LIMA to LAD, vein graft to RCA and vein graft to circumflex. He has lost his vein graft to RCA and circumflex. His LIMA to LAD is widely patent. Cardiac catheterization also shows severe three-vessel disease with occlusion of obtuse marginal 1 vein graft, and RCA is 100% occluded and LAD is proximally 100% occluded. He had a stent which is widely patent in the circumflex. His EKG has shown sinus rhythm with heart rate 80 bpm in March 2022.   He has history of chronic stable angina, type 2 diabetes mellitus, dyslipidemia, CRI. Recently he lost his wife. He is otherwise doing well. He lives alone and his family member help take care of him. He does have history of abdominal aortic aneurysm and PVD s/p repair. His med list reviewed. No nausea no vomiting no fever no chills he has been doing well today. His last blood test was on 6/1/2023 which shows his creatinine 1.63 his cholesterol profile is acceptable vitals are stable and heart rate is 76 bpm.    Past surgical history is also significant for abdominal aortic aneurysm repair, gallbladder surgery, history of prostate cancer s/p prostatectomy. No recent surgery. Echo as well as his cath report and images reviewed with him. To follow-up with vascular. We will refer him back to him. 7/10/2023. Above reviewed. Patient came for follow-up. His labs reviewed creatinine is around 2. Echo done recently reviewed and found to have PA pressure around 50 to 60 mmHg. He has a history of sleep apnea could not tolerate CPAP machine do not like to use it. Otherwise she is feeling well. Other risk as mentioned above. 10/17/2023. Above reviewed. Patient came for follow-up he is doing well. He was put on Ranexa. His med list was reviewed creatinine has improved to 1.63. Cholesterol profile and other labs are acceptable his vitals are stable. His weight has been stable. No nausea no vomiting no fever no chills chronic shortness of breath not changed. And he is feeling better. He has been put on Trental by podiatry. Other meds reviewed. Nephrology note noted. Serum creatinine has been stable. Review of Systems   Constitutional:  Negative for activity change, chills, diaphoresis, fever and unexpected weight change. HENT:  Negative for congestion. Eyes:  Negative for discharge and redness. Respiratory:  Positive for shortness of breath. Negative for cough, chest tightness and wheezing. Shortness of breath chronic not changed. Cardiovascular: Negative. Negative for chest pain, palpitations and leg swelling. Gastrointestinal:  Negative for abdominal pain, diarrhea and nausea. Endocrine: Negative. Genitourinary:  Negative for decreased urine volume and urgency. Musculoskeletal:  Positive for arthralgias, back pain and gait problem. Foot pain   Skin:  Negative for rash and wound. Allergic/Immunologic: Negative. Neurological:  Negative for dizziness, seizures, syncope, weakness, light-headedness and headaches. Hematological: Negative. Psychiatric/Behavioral:  Negative for agitation and confusion. The patient is nervous/anxious. Historical Information   Past Medical History:   Diagnosis Date   • CAD (coronary artery disease)    • Cancer ([facility])     prostate   • CHF (congestive heart failure) ([facility])    • Diabetes mellitus ([facility])    • Myocardial infarction [facility])      Past Surgical History:   Procedure Laterality Date   • ADENOIDECTOMY     • CARDIAC CATHETERIZATION Left 10/19/2022    Procedure: Cardiac Left Heart Cath;  Surgeon: Maritza Lacey MD;  [facility];   Service: Cardiology   • CARDIAC SURGERY  2002    3 cardiac bypass then angioplasty 2020   • CHOLECYSTECTOMY     • CORONARY ARTERY BYPASS GRAFT     • PROSTATE SURGERY     • TONSILLECTOMY       Social History     Substance and Sexual Activity   Alcohol Use Yes   • Alcohol/week: 14.0 standard drinks of alcohol   • Types: 14 Cans of beer per week    Comment: 1 -2 daily     Social History     Substance and Sexual Activity   Drug Use Never     Social History     Tobacco Use   Smoking Status Former   • Packs/day: 1.50   • Years: 33.00   • Total pack years: 49.50   • Types: Cigarettes   • Start date:    • Quit date:    • Years since quittin.8   Smokeless Tobacco Never     Family History:   Family History   Problem Relation Age of Onset   • Diabetes Mother    • Alcohol abuse Father    • Mental illness Neg Hx        Meds/Allergies Allergies   Allergen Reactions   • Lisinopril Rash and Lip Swelling       Current Outpatient Medications:   •  amLODIPine (NORVASC) 10 mg tablet, Take 0.5 tablets (5 mg total) by mouth daily, Disp: 1 tablet, Rfl: 1  •  aspirin 81 mg chewable tablet, Chew 81 mg daily, Disp: , Rfl:   •  atorvastatin (LIPITOR) 40 mg tablet, Take 1 tablet (40 mg total) by mouth daily, Disp: 90 tablet, Rfl: 3  •  Blood Glucose Monitoring Suppl (OneTouch Verio Reflect) w/Device KIT, Check blood sugars twice daily. Please substitute with appropriate alternative as covered by patient's insurance. Dx: E11.65, Disp: 1 kit, Rfl: 0  •  cloNIDine (CATAPRES) 0.1 mg tablet, Take 1 tablet (0.1 mg total) by mouth every 12 (twelve) hours, Disp: 180 tablet, Rfl: 1  •  clopidogrel (PLAVIX) 75 mg tablet, Take 1 tablet (75 mg total) by mouth daily, Disp: 90 tablet, Rfl: 1  •  Droplet Pen Needles 32G X 4 MM MISC, USE EVERY EVENING, Disp: 100 each, Rfl: 5  •  fenofibrate 160 MG tablet, Take 1 tablet (160 mg total) by mouth daily, Disp: 90 tablet, Rfl: 3  •  gabapentin (NEURONTIN) 600 MG tablet, TAKE 1 TABLET BY MOUTH  TWICE DAILY, Disp: 180 tablet, Rfl: 3  •  glimepiride (AMARYL) 2 mg tablet, Take 1 tablet (2 mg total) by mouth 2 (two) times a day, Disp: 180 tablet, Rfl: 1  •  glucose blood (OneTouch Verio) test strip, Check blood sugars twice daily. Please substitute with appropriate alternative as covered by patient's insurance.  Dx: E11.65, Disp: 200 each, Rfl: 3  •  Insulin Glargine Solostar (Lantus SoloStar) 100 UNIT/ML SOPN, Inject 0.15 mL (15 Units total) under the skin every evening, Disp: 15 mL, Rfl: 0  •  isosorbide mononitrate (IMDUR) 60 mg 24 hr tablet, Take 1 tablet (60 mg total) by mouth daily, Disp: 90 tablet, Rfl: 3  •  Jardiance 25 MG TABS, TAKE 1 TABLET BY MOUTH IN THE  MORNING, Disp: 90 tablet, Rfl: 1  •  metFORMIN (GLUCOPHAGE) 500 mg tablet, TAKE 1 TABLET BY MOUTH  TWICE DAILY WITH MEALS, Disp: 180 tablet, Rfl: 3  •  metoprolol tartrate (LOPRESSOR) 50 mg tablet, Take 1 tablet (50 mg total) by mouth every 12 (twelve) hours, Disp: 180 tablet, Rfl: 3  •  Multiple Vitamins-Minerals (MULTIVITAMIN MEN 50+ PO), Take by mouth daily, Disp: , Rfl:   •  nitroglycerin (NITROSTAT) 0.4 mg SL tablet, Place 1 tablet (0.4 mg total) under the tongue every 5 (five) minutes as needed for chest pain, Disp: 30 tablet, Rfl: 3  •  Omega-3 Fatty Acids (fish oil) 1,000 mg, Take 4,000 mg by mouth daily , Disp: , Rfl:   •  OneTouch Delica Lancets 68G MISC, Check blood sugars twice daily. Please substitute with appropriate alternative as covered by patient's insurance. Dx: E11.65, Disp: 200 each, Rfl: 3  •  pentoxifylline (TRENtal) 400 mg ER tablet, Take 1 tablet (400 mg total) by mouth 3 (three) times a day with meals, Disp: 90 tablet, Rfl: 1  •  ranolazine (RANEXA) 500 mg 12 hr tablet, take 1 tablet by mouth twice a day, Disp: 60 tablet, Rfl: 3  •  sitaGLIPtin (JANUVIA) 100 mg tablet, Take 1 tablet (100 mg total) by mouth daily, Disp: 90 tablet, Rfl: 1  •  torsemide (DEMADEX) 20 mg tablet, Take 1 tablet (20 mg total) by mouth daily, Disp: 90 tablet, Rfl: 3    Vitals: Blood pressure 130/60, pulse 56, height 6' 2" (1.88 m), weight 110 kg (242 lb), SpO2 98 %. ?  Body mass index is 31.07 kg/m². Wt Readings from Last 3 Encounters:   10/17/23 110 kg (242 lb)   10/09/23 109 kg (240 lb)   09/22/23 108 kg (238 lb)     Vitals:    10/17/23 1010   Weight: 110 kg (242 lb)     BP Readings from Last 3 Encounters:   10/17/23 130/60   10/09/23 114/64   07/26/23 158/80       Physical Exam  Constitutional:       General: He is not in acute distress. Appearance: He is well-developed. He is not diaphoretic. Neck:      Thyroid: No thyromegaly. Vascular: No JVD. Trachea: No tracheal deviation. Cardiovascular:      Rate and Rhythm: Normal rate and regular rhythm. Heart sounds: S1 normal and S2 normal. Heart sounds not distant. Murmur heard.       Systolic (ejection) murmur is present with a grade of 2/6. No friction rub. No gallop. No S3 or S4 sounds. Pulmonary:      Effort: Pulmonary effort is normal. No respiratory distress. Breath sounds: Normal breath sounds. No wheezing or rales. Chest:      Chest wall: No tenderness. Abdominal:      General: Bowel sounds are normal. There is no distension. Palpations: Abdomen is soft. Tenderness: There is no abdominal tenderness. Musculoskeletal:         General: No deformity. Cervical back: Neck supple. Skin:     General: Skin is warm and dry. Coloration: Skin is not pale. Findings: No rash. Neurological:      Mental Status: He is alert and oriented to person, place, and time. Psychiatric:         Behavior: Behavior normal.         Judgment: Judgment normal.               Diagnostic Studies Review Cardio:    Patient has cardiac catheterization done on 2/1/2022 which shows that he has history of coronary artery with three-vessel bypass. He has lost his vein graft to RCA, % occluded, vein graft to circumflex also occluded, stent placed in distal circumflex is widely patent, LIMA to LAD was widely patent and LAD proximally 100% occluded. Echo Doppler. Echo Doppler done in 7 6/26/2022 shows EF 50 to 04%, grade 1 diastolic dysfunction, left atrium mildly dilated, thickened aortic valve with mild aortic stenosis and mild MR and mild TR. Aortic root is mildly dilated. Echo Doppler.   Echo Doppler done 6/17/2023 shows mild to moderate left medical hypertrophy, EF 45%, grade 2 diastolic dysfunction, left atrium moderately dilated, thickened aortic valve with mild to moderate stenosis, mild tricuspid regurgitation with moderate to severely elevated pulmonary artery pressure 50 to 60 mmHg aortic root mildly dilated    EKG:    Results for orders placed during the hospital encounter of 02/16/22    NM Myocardial Perfusion Spect (Pharmacological Induced Stress and/or Rest)    Interpretation Summary  •  Stress Combined Conclusion: Left ventricular perfusion is abnormal. There is mild photon reduction in the anterior wall at stress. Findings are consistent with ischemia. Additional area of infarct involving the mid and apical portion of the inferior wall. •  Stress Function: Left ventricular function post-stress is abnormal. Global function is mildly reduced. There were no regional wall motion abnormalities during stress. Post-stress ejection fraction is 40 %. •  Stress ECG: No ST deviation is noted. There were no arrhythmias during recovery. . The ECG was negative for ischemia. •  Perfusion: There is a left ventricular perfusion defect that is small in size with severe reduction in uptake present in the mid to apical inferior location(s) that is fixed. There is a left ventricular perfusion defect that is small to medium in size with mild reduction in uptake present in the mid to apical anterior location(s) that is reversible. XR chest 1 view portable    Result Date: 5/11/2023  Narrative: CHEST INDICATION:   elevated troponin. COMPARISON: Chest radiograph dated 3/1/2023. EXAM PERFORMED/VIEWS:  XR CHEST PORTABLE FINDINGS: Status post median sternotomy. Cardiomediastinal silhouette is stably enlarged. No focal airspace consolidation. No pneumothorax or pleural effusion. Osseous structures appear within normal limits for patient age. Impression: No acute cardiopulmonary disease. Workstation performed: [id number]       Imaging:  Chest X-Ray:   XR chest pa & lateral    Result Date: 3/2/2023  Impression No acute cardiopulmonary disease.  Workstation performed: [id number]       Lab Review   Lab Results   Component Value Date    WBC 6.77 10/04/2023    HGB 14.0 10/04/2023    HCT 43.9 10/04/2023    MCV 89 10/04/2023    RDW 14.2 10/04/2023     10/04/2023     BMP:  Lab Results   Component Value Date    SODIUM 143 10/04/2023    K 4.4 10/04/2023     10/04/2023    CO2 30 10/04/2023    BUN 35 (H) 10/04/2023    CREATININE 1.63 (H) 10/04/2023    GLUC 147 (H) 06/19/2023    GLUF 110 (H) 10/04/2023    CALCIUM 9.4 10/04/2023    CORRECTEDCA 10.5 (H) 03/09/2023    EGFR 39 10/04/2023    MG 2.3 10/04/2023     Troponins:    LFT:  Lab Results   Component Value Date    AST 24 10/04/2023    ALT 21 10/04/2023    ALKPHOS 36 10/04/2023    TP 7.5 10/04/2023    ALB 4.1 10/04/2023        Lab Results   Component Value Date    HGBA1C 7.0 (H) 10/04/2023     Lipid Profile:   Lab Results   Component Value Date    CHOLESTEROL 119 10/04/2023    HDL 49 10/04/2023    LDLCALC 51 10/04/2023    TRIG 93 10/04/2023     Lab Results   Component Value Date    CHOLESTEROL 119 10/04/2023    CHOLESTEROL 114 03/09/2023       Lab Results   Component Value Date    NTBNP 420 02/16/2022            Dr. Anu Mcdonald MD [facility]        "This note was completed in part utilizing [employer] fluency direct voice recognition software. Grammatical errors, random word insertion, spelling mistakes, and incomplete sentences may be an occasional consequence of the system secondary to software limitations, ambient noise and hardware issues. Please read the chart carefully and recognize, using context, where substitutions have occurred.   If you have any questions or concerns about the context, text or information contained within the body of this dictation, please contact myself, the provider, for further clarification."

## 2023-10-17 NOTE — PROGRESS NOTES
Assessment/Plan: Sumner grade 0/1 ulcer plantar aspect right heel. Diabetic patient with peripheral artery disease. Intermittent claudication. Mycosis of nail. Pain upon ambulation. Acquired deformity of foot. Plan. Chart reviewed. Vascular studies reviewed. Patient advised on condition. Patient states he is following up with vascular surgery. At this time we will add Trental.  Today wound debrided. Gentian violet dry sterile dressing applied. All mycotic nails debrided without pain or complication. Patient advised on aftercare. Return for follow-up. Watch for signs of infection. MRI ordered to rule out osteomyelitis of right heel. Rule out foreign body. Diagnoses and all orders for this visit:     Diabetic ulcer of right heel associated with type 2 diabetes mellitus, limited to breakdown of skin ([address])     Peripheral arteriosclerosis (HCC)  -     pentoxifylline (TRENtal) 400 mg ER tablet; Take 1 tablet (400 mg total) by mouth 3 (three) times a day with meals     Diabetic polyneuropathy associated with type 2 diabetes mellitus (HCC)     Acquired deformity of right foot     Onychomycosis     Claudication of right lower extremity (HCC)  -     pentoxifylline (TRENtal) 400 mg ER tablet; Take 1 tablet (400 mg total) by mouth 3 (three) times a day with meals            Subjective:            Allergies   Allergen Reactions    Lisinopril Rash and Lip Swelling            Current Outpatient Medications:     pentoxifylline (TRENtal) 400 mg ER tablet, Take 1 tablet (400 mg total) by mouth 3 (three) times a day with meals, Disp: 90 tablet, Rfl: 1    amLODIPine (NORVASC) 10 mg tablet, Take 0.5 tablets (5 mg total) by mouth daily, Disp: 1 tablet, Rfl: 1    aspirin 81 mg chewable tablet, Chew 81 mg daily, Disp: , Rfl:     atorvastatin (LIPITOR) 40 mg tablet, Take 1 tablet (40 mg total) by mouth daily, Disp: 90 tablet, Rfl: 3    Blood Glucose Monitoring Suppl (OneTouch Verio Reflect) w/Device KIT, Check blood sugars twice daily. Please substitute with appropriate alternative as covered by patient's insurance. Dx: E11.65, Disp: 1 kit, Rfl: 0    cadexomer iodine (IODOSORB) 0.9 % gel, Apply 1 application topically daily as needed for wound care (Patient not taking: Reported on 6/29/2023), Disp: 10 g, Rfl: 0    ciclopirox (LOPROX) 0.77 % cream, Apply topically 2 (two) times a day for 20 days, Disp: 90 g, Rfl: 2    cloNIDine (CATAPRES) 0.1 mg tablet, Take 1 tablet (0.1 mg total) by mouth every 12 (twelve) hours, Disp: 180 tablet, Rfl: 1    clopidogrel (PLAVIX) 75 mg tablet, Take 1 tablet (75 mg total) by mouth daily, Disp: 90 tablet, Rfl: 1    fenofibrate 160 MG tablet, Take 1 tablet (160 mg total) by mouth daily, Disp: 90 tablet, Rfl: 3    gabapentin (NEURONTIN) 600 MG tablet, TAKE 1 TABLET BY MOUTH  TWICE DAILY, Disp: 180 tablet, Rfl: 3    glimepiride (AMARYL) 2 mg tablet, Take 1 tablet (2 mg total) by mouth 2 (two) times a day, Disp: 180 tablet, Rfl: 1    glucose blood (OneTouch Verio) test strip, Check blood sugars twice daily. Please substitute with appropriate alternative as covered by patient's insurance.  Dx: E11.65, Disp: 200 each, Rfl: 3    Insulin Glargine Solostar (Lantus SoloStar) 100 UNIT/ML SOPN, Inject 0.15 mL (15 Units total) under the skin every evening, Disp: 15 mL, Rfl: 0    Insulin Pen Needle 32G X 4 MM MISC, Use every evening, Disp: 100 each, Rfl: 5    isosorbide mononitrate (IMDUR) 60 mg 24 hr tablet, Take 1 tablet (60 mg total) by mouth daily, Disp: 90 tablet, Rfl: 3    Jardiance 25 MG TABS, TAKE 1 TABLET BY MOUTH IN  THE MORNING, Disp: 90 tablet, Rfl: 3    metFORMIN (GLUCOPHAGE) 500 mg tablet, TAKE 1 TABLET BY MOUTH  TWICE DAILY WITH MEALS, Disp: 180 tablet, Rfl: 3    metoprolol tartrate (LOPRESSOR) 50 mg tablet, Take 1 tablet (50 mg total) by mouth every 12 (twelve) hours, Disp: 180 tablet, Rfl: 3    Multiple Vitamins-Minerals (MULTIVITAMIN MEN 50+ PO), Take by mouth daily, Disp: , Rfl: nitroglycerin (NITROSTAT) 0.4 mg SL tablet, Place 1 tablet (0.4 mg total) under the tongue every 5 (five) minutes as needed for chest pain, Disp: 30 tablet, Rfl: 3    Omega-3 Fatty Acids (fish oil) 1,000 mg, Take 4,000 mg by mouth daily , Disp: , Rfl:     OneTouch Delica Lancets 69Q MISC, Check blood sugars twice daily. Please substitute with appropriate alternative as covered by patient's insurance.  Dx: E11.65, Disp: 200 each, Rfl: 3    ranolazine (RANEXA) 500 mg 12 hr tablet, take 1 tablet by mouth twice a day, Disp: 60 tablet, Rfl: 3    sitaGLIPtin (JANUVIA) 100 mg tablet, Take 100 mg by mouth daily, Disp: , Rfl:     torsemide (DEMADEX) 20 mg tablet, Take 1 tablet (20 mg total) by mouth daily, Disp: 90 tablet, Rfl: 3         Patient Active Problem List   Diagnosis    Mixed hyperlipidemia    Benign hypertension with CKD (chronic kidney disease) stage III (HCC)    Coronary artery disease involving native coronary artery of native heart without angina pectoris    S/P angioplasty with stent    Hx of CABG    S/P AAA repair    S/P prostatectomy    H/O prostate cancer    Type 2 diabetes mellitus with diabetic polyneuropathy, with long-term current use of insulin ([facility])    Varicose veins of left lower extremity    History of endovascular stent graft for abdominal aortic aneurysm (AAA)    Bruit (arterial)    Peripheral artery disease (HCC)    Type 2 diabetes mellitus with diabetic peripheral angiopathy without gangrene, with long-term current use of insulin (HCC)    Actinic keratoses    Stage 3b chronic kidney disease ([address])    Platelets decreased ([address])    Gross hematuria    Chronic systolic heart failure (HCC)    Mild aortic stenosis    Chronic kidney disease-mineral and bone disorder    Stable angina pectoris ([address])    Hypertensive urgency    Elevated troponin level not due myocardial infarction    Diabetic ulcer of right midfoot associated with type 2 diabetes mellitus, limited to breakdown of skin ([address])    Acute on chronic diastolic heart failure (HCC)    Frequent PVCs    Nausea             Patient ID: Louis Sanchez is a 78 y.o. male. HPI     The following portions of the patient's history were reviewed and updated as appropriate:      family history includes Alcohol abuse in his father; Diabetes in his mother. reports that he quit smoking about 35 years ago. His smoking use included cigarettes. He started smoking about 67 years ago. He has a 49.50 pack-year smoking history. He has never used smokeless tobacco. He reports current alcohol use of about 14.0 standard drinks of alcohol per week. He reports that he does not use drugs. Vitals:     09/22/23 1317   Resp: 17         Review of Systems       Objective:  Patient's shoes and socks removed. Foot ExamPhysical Exam           General  General Appearance: appears stated age and healthy   Orientation: alert and oriented to person, place, and time   Affect: appropriate   Gait: antalgic         Right Foot/Ankle      Inspection and Palpation  Tenderness: metatarsals   Swelling: none   Arch: pes planus  Claw Toes: fifth toe  Hallux limitus: yes  Skin Exam: callus, dry skin and tinea; Neurovascular  Dorsalis pedis: 1+  Posterior tibial: 1+  Saphenous nerve sensation: absent  Tibial nerve sensation: absent  Superficial peroneal nerve sensation: absent  Deep peroneal nerve sensation: absent  Sural nerve sensation: absent        Left Foot/Ankle       Inspection and Palpation  Tenderness: metatarsals   Swelling: none   Arch: pes planus  Claw toes: second toe and fifth toe  Hallux limitus: yes  Skin Exam: callus, dry skin and tinea; Neurovascular  Dorsalis pedis: 1+  Posterior tibial: 1+  Saphenous nerve sensation: absent  Tibial nerve sensation: absent  Superficial peroneal nerve sensation: absent  Deep peroneal nerve sensation: absent  Sural nerve sensation: absent           Physical Exam  Vitals signs and nursing note reviewed.    Cardiovascular:      Rate and Rhythm: Normal rate and regular rhythm. Pulses:           Dorsalis pedis pulses are 1+ on the right side and 1+ on the left side. Posterior tibial pulses are 1+ on the right side and 1+ on the left side. Feet:      Right foot:      Skin integrity: Callus and dry skin present. Left foot:      Skin integrity: Callus and dry skin present. Skin:     Capillary Refill: Capillary refill takes 2 to 3 seconds. Comments:   All nails are thick and mycotic. Patient demonstrates bilateral dermatophytosis. There is a pre ulcerative/ corn on the plantar aspect 2nd left toe. Right Foot/Ankle   Right Foot Inspection  Skin Exam: dry skin, callus and callus                                 Vascular     The right DP pulse is 1+. The right PT pulse is 1+. Right Toe  - Comprehensive Exam  Arch: pes planus  Claw Toes: fifth toe  Hallux limitus: yes  Swelling: none   Tenderness: metatarsals            Left Foot/Ankle  Left Foot Inspection  Skin Exam: dry skin and callus                                             Vascular     The left DP pulse is 1+. The left PT pulse is 1+. Left Toe  - Comprehensive Exam  Arch: pes planus  Claw toes: second toe and fifth toe  Hallux limitus: yes  Swelling: none   Tenderness: metatarsals      Patient's shoes and socks removed. Assign Risk Category:  Deformity present; Loss of protective sensation; Weak pulses       Risk: 2        Patient has profound dermatophytosis and xerosis of the foot bilateral.  There is a large Sumner grade 1 ulcer on the plantar aspect of the right heel. Wound is approxione 0.5 cm² in size. No evidence of cellulitis at this time. Palpable plantar spurring noted. Rule out osteomyelitis.

## 2023-10-18 ENCOUNTER — TELEPHONE (OUTPATIENT)
Dept: PODIATRY | Facility: CLINIC | Age: 78
End: 2023-10-18

## 2023-10-18 NOTE — TELEPHONE ENCOUNTER
Caller: [facility]    Doctor: Georgia Granda DPM    Reason for call: [facility] scheduled the MRI for the ankle/heel right foot. There is another in for an MRI for ankle/heel, left foot. Was that put in in error?     Call back#: [phone] [facility] Scheduling

## 2023-10-20 ENCOUNTER — PREP FOR PROCEDURE (OUTPATIENT)
Dept: VASCULAR SURGERY | Facility: CLINIC | Age: 78
End: 2023-10-20

## 2023-10-20 ENCOUNTER — CONSULT (OUTPATIENT)
Dept: VASCULAR SURGERY | Facility: CLINIC | Age: 78
End: 2023-10-20
Payer: COMMERCIAL

## 2023-10-20 ENCOUNTER — TELEPHONE (OUTPATIENT)
Dept: VASCULAR SURGERY | Facility: CLINIC | Age: 78
End: 2023-10-20

## 2023-10-20 VITALS
HEART RATE: 68 BPM | RESPIRATION RATE: 18 BRPM | SYSTOLIC BLOOD PRESSURE: 148 MMHG | OXYGEN SATURATION: 96 % | BODY MASS INDEX: 30.67 KG/M2 | WEIGHT: 239 LBS | DIASTOLIC BLOOD PRESSURE: 82 MMHG | HEIGHT: 74 IN

## 2023-10-20 DIAGNOSIS — E11.621 DIABETIC ULCER OF RIGHT MIDFOOT ASSOCIATED WITH TYPE 2 DIABETES MELLITUS, LIMITED TO BREAKDOWN OF SKIN (HCC): Primary | ICD-10-CM

## 2023-10-20 DIAGNOSIS — E11.51 DIABETES MELLITUS TYPE 2 WITH PERIPHERAL ARTERY DISEASE (HCC): ICD-10-CM

## 2023-10-20 DIAGNOSIS — L97.411 DIABETIC ULCER OF RIGHT MIDFOOT ASSOCIATED WITH TYPE 2 DIABETES MELLITUS, LIMITED TO BREAKDOWN OF SKIN (HCC): Primary | ICD-10-CM

## 2023-10-20 PROCEDURE — 99214 OFFICE O/P EST MOD 30 MIN: CPT | Performed by: SURGERY

## 2023-10-20 RX ORDER — CHLORHEXIDINE GLUCONATE ORAL RINSE 1.2 MG/ML
15 SOLUTION DENTAL ONCE
OUTPATIENT
Start: 2023-10-20 | End: 2023-10-20

## 2023-10-20 NOTE — TELEPHONE ENCOUNTER
REMINDER: Under Reason For Call, comments MUST be formatted as:   (Surgeon's Initials) / (Procedure)      Special Instructions / FYI:     Procedure:  RLE AGRAM    Level: 3 - Route clearance(s) to [facility] Clearance Pool     Allergies: Lisinopril    Instructions Given: Angiogram Instructions    Dialysis: Patient is not on dialysis. Return Visit Required Prior to Procedure: No.    Consent: I certify that patient has signed, printed, timed, and dated their surgery consent. I certify that the patient's LEGAL NAME and DATE OF BIRTH are written in the upper left corner on BOTH sides of the consent. I certify that BOTH sides of the completed surgery consent have been scanned into the patient's Epic chart by myself on 10/20/2023. Yes, I have LABELED the consent in Epic as Consent for Vascular Procedure. For Surgical Clearances     Levels   1-3   ROUTE this encounter to [facility] Clearance Pool (AND)   [facility] Surgery Coordinator Pool     Level   4   ROUTE this encounter to [facility] Surgery Coordinator Pool       HYDRATION CLEARANCES   ONLY ROUTE TO  [facility] Clearance Pool     Patient does not require any pre operative clearance. Yes, I have ROUTED this encounter to [facility] Surgery Coordinator and/or [facility] Clearance Pool.

## 2023-10-20 NOTE — LETTER
October 20, 2023     Rissa Curtis [place]  [id number] [place] [id number]    Patient: Liv Arias   YOB: 1945   Date of Visit: 10/20/2023       Dear Dr. Valentino Lowenstein:    Thank you for referring Liv Arias to me for evaluation. Below are my notes for this consultation. If you have questions, please do not hesitate to call me. I look forward to following your patient along with you. Sincerely,        Corinne Velázquez MD        CC: MICHAEL Trent MD  10/20/2023 10:21 AM  Sign when Signing Visit  Assessment/Plan:    Diabetic ulcer of right midfoot associated with type 2 diabetes mellitus, limited to breakdown of skin ([address])  Right lower extremity critical limb threatening ischemia with tissue loss. He stepped on a nail in April and has failed to heal.  Noninvasive arterial studies suggest toe pressure inadequate for healing and flatline waveforms. He has bilateral femoral pulses. I proposed a right lower extremity arteriogram for evaluation and potential intervention and treatment of his foot wound. The patient is in agreement and signed consent in the office. Lab Results   Component Value Date    HGBA1C 7.0 (H) 10/04/2023     Subjective:      Patient ID: Liv Arias is a 78 y.o. male. Patient had NILS on 9/20/23. Pt states that he has a wound on the bottom of his Rt foot for the past 4-5 months. Pt denies claudication. Pt has occasional cramping pain in the legs at night. HPI  The patient is an 78-year-old male who has longstanding diabetes with a currently controlled A1c of 7.0 and a history of tobacco abuse quitting in the 1980s. He suffered a wound to his foot in April which has failed to heal.  He follows with a podiatrist Dr. Jammie Gibbs who appropriately requested outpatient noninvasive arterial studies.   These noninvasive arterial studies demonstrated inadequate healing pressures for diabetic in his foot as well as flatline waveforms in the lower extremity. The patient describes a constant pain in his right foot which is worsened when lying flat as well as with weightbearing on the heel. He has tried local wound care which has not been successful. The pain does not radiate. He is very active and ambulatory in his daily life and is moving in to his daughter's home to help with her family. His son-in-law and wife had recently passed away. Review of Systems   Constitutional: Negative. HENT: Negative. Eyes: Negative. Respiratory:  Positive for shortness of breath (with activity). Gastrointestinal: Negative. Endocrine: Negative. Genitourinary: Negative. Musculoskeletal:  Positive for arthralgias and gait problem. Skin:  Positive for color change and wound. Allergic/Immunologic: Negative. Neurological:  Positive for numbness (feet). Hematological: Negative. Psychiatric/Behavioral: Negative. Objective:  /82 (BP Location: Left arm, Patient Position: Sitting)   Pulse 68   Resp 18   Ht 6' 2" (1.88 m)   Wt 108 kg (239 lb)   SpO2 96%   BMI 30.69 kg/m²      Physical Exam  Constitutional:       Appearance: Normal appearance. HENT:      Head: Normocephalic and atraumatic. Nose: Nose normal. No rhinorrhea. Eyes:      Extraocular Movements: Extraocular movements intact. Pupils: Pupils are equal, round, and reactive to light. Cardiovascular:      Rate and Rhythm: Normal rate and regular rhythm. Pulses:           Radial pulses are 2+ on the left side. Femoral pulses are 2+ on the right side and 2+ on the left side. Dorsalis pedis pulses are detected w/ Doppler on the right side and detected w/ Doppler on the left side. Posterior tibial pulses are detected w/ Doppler on the right side and detected w/ Doppler on the left side. Pulmonary:      Effort: Pulmonary effort is normal.      Breath sounds: No stridor.    Abdominal: General: There is no distension. Tenderness: There is no abdominal tenderness. Musculoskeletal:         General: No tenderness. Normal range of motion. Cervical back: Normal range of motion and neck supple. Skin:     General: Skin is warm. Capillary Refill: Capillary refill takes less than 2 seconds. Coloration: Skin is not jaundiced. Neurological:      General: No focal deficit present. Mental Status: He is alert and oriented to person, place, and time.    Psychiatric:         Mood and Affect: Mood normal.         Behavior: Behavior normal.         Operative Scheduling Information:    Hospital:  [facility]    Physician:  Me    Surgery: Right lower extremity arteriogram with CO2 via left brachial artery cutdown    Urgency:  Standard    Level:  Level 3: Outpatients to be scheduled for elective surgery than can be delayed up to 4 weeks without reasonable expectation of detriment to patient    Case Length:  Normal    Post-op Bed:  Outpatient    OR Table:  [facility]    Equipment Needs:  None    Medication Instructions:  Aspirin:   Continue (do not hold)  Plavix:  Continue (do not hold)    Hydration:  50 cc/hr for 4 hour prior and 4 hours after procedure

## 2023-10-20 NOTE — PROGRESS NOTES
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 He stepped on a nail in April and has failed to heal.  Noninvasive arterial studies suggest toe pressure inadequate for healing and flatline waveforms. He has bilateral femoral pulses. I proposed a right lower extremity arteriogram for evaluation and potential intervention and treatment of his foot wound. The patient is in agreement and signed consent in the office. Lab Results   Component Value Date    HGBA1C 7.0 (H) 10/04/2023     Subjective:      Patient ID: Elle Stroud is a 78 y.o. male. Patient had NILS on 9/20/23. Pt states that he has a wound on the bottom of his Rt foot for the past 4-5 months. Pt denies claudication. Pt has occasional cramping pain in the legs at night. HPI  The patient is an 78-year-old male who has longstanding diabetes with a currently controlled A1c of 7.0 and a history of tobacco abuse quitting in the 1980s. He suffered a wound to his foot in April which has failed to heal.  He follows with a podiatrist Dr. Deirdre Sorensen who appropriately requested outpatient noninvasive arterial studies. These noninvasive arterial studies demonstrated inadequate healing pressures for diabetic in his foot as well as flatline waveforms in the lower extremity. The patient describes a constant pain in his right foot which is worsened when lying flat as well as with weightbearing on the heel. He has tried local wound care which has not been successful. The pain does not radiate. He is very active and ambulatory in his daily life and is moving in to his daughter's home to help with her family. His son-in-law and wife had recently passed away. Review of Systems   Constitutional: Negative. HENT: Negative. Eyes: Negative. Respiratory:  Positive for shortness of breath (with activity). Gastrointestinal: Negative. Endocrine: Negative. Genitourinary: Negative. Musculoskeletal:  Positive for arthralgias and gait problem. Skin:  Positive for color change and wound. Allergic/Immunologic: Negative. Neurological:  Positive for numbness (feet). Hematological: Negative. Psychiatric/Behavioral: Negative. Objective:  /82 (BP Location: Left arm, Patient Position: Sitting)   Pulse 68   Resp 18   Ht 6' 2" (1.88 m)   Wt 108 kg (239 lb)   SpO2 96%   BMI 30.69 kg/m²      Physical Exam  Constitutional:       Appearance: Normal appearance. HENT:      Head: Normocephalic and atraumatic. Nose: Nose normal. No rhinorrhea. Eyes:      Extraocular Movements: Extraocular movements intact. Pupils: Pupils are equal, round, and reactive to light. Cardiovascular:      Rate and Rhythm: Normal rate and regular rhythm. Pulses:           Radial pulses are 2+ on the left side. Femoral pulses are 2+ on the right side and 2+ on the left side. Dorsalis pedis pulses are detected w/ Doppler on the right side and detected w/ Doppler on the left side. Posterior tibial pulses are detected w/ Doppler on the right side and detected w/ Doppler on the left side. Pulmonary:      Effort: Pulmonary effort is normal.      Breath sounds: No stridor. Abdominal:      General: There is no distension. Tenderness: There is no abdominal tenderness. Musculoskeletal:         General: No tenderness. Normal range of motion. Cervical back: Normal range of motion and neck supple. Skin:     General: Skin is warm. Capillary Refill: Capillary refill takes less than 2 seconds. Coloration: Skin is not jaundiced. Neurological:      General: No focal deficit present. Mental Status: He is alert and oriented to person, place, and time.    Psychiatric:         Mood and Affect: Mood normal.         Behavior: Behavior normal.         Operative Scheduling Information:    Hospital:  [facility], [facility], or [place] [facility]    Physician:  Me    Surgery: Right lower extremity arteriogram with CO2 via left brachial artery cutdown    Urgency:  Standard    Level:  Level 3: Outpatients to be scheduled for elective surgery than can be delayed up to 4 weeks without reasonable expectation of detriment to patient    Case Length:  Normal    Post-op Bed:  Outpatient    OR Table:  [facility]    Equipment Needs:  None    Medication Instructions:  Aspirin:   Continue (do not hold)  Plavix:  Continue (do not hold)    Hydration:  50 cc/hr for 4 hour prior and 4 hours after procedure

## 2023-10-20 NOTE — TELEPHONE ENCOUNTER
Left message for patient to call us back so that we can schedule his procedure on 11-1-23 at [facility] with Dr. Jerome Linker

## 2023-10-20 NOTE — H&P (VIEW-ONLY)
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 He stepped on a nail in April and has failed to heal.  Noninvasive arterial studies suggest toe pressure inadequate for healing and flatline waveforms. He has bilateral femoral pulses. I proposed a right lower extremity arteriogram for evaluation and potential intervention and treatment of his foot wound. The patient is in agreement and signed consent in the office. Lab Results   Component Value Date    HGBA1C 7.0 (H) 10/04/2023     Subjective:      Patient ID: Eliza Carter is a 78 y.o. male. Patient had NILS on 9/20/23. Pt states that he has a wound on the bottom of his Rt foot for the past 4-5 months. Pt denies claudication. Pt has occasional cramping pain in the legs at night. HPI  The patient is an 24-year-old male who has longstanding diabetes with a currently controlled A1c of 7.0 and a history of tobacco abuse quitting in the 1980s. He suffered a wound to his foot in April which has failed to heal.  He follows with a podiatrist Dr. Moises Mcdonald who appropriately requested outpatient noninvasive arterial studies. These noninvasive arterial studies demonstrated inadequate healing pressures for diabetic in his foot as well as flatline waveforms in the lower extremity. The patient describes a constant pain in his right foot which is worsened when lying flat as well as with weightbearing on the heel. He has tried local wound care which has not been successful. The pain does not radiate. He is very active and ambulatory in his daily life and is moving in to his daughter's home to help with her family. His son-in-law and wife had recently passed away. Review of Systems   Constitutional: Negative. HENT: Negative. Eyes: Negative. Respiratory:  Positive for shortness of breath (with activity). Gastrointestinal: Negative. Endocrine: Negative. Genitourinary: Negative. Musculoskeletal:  Positive for arthralgias and gait problem. Skin:  Positive for color change and wound. Allergic/Immunologic: Negative. Neurological:  Positive for numbness (feet). Hematological: Negative. Psychiatric/Behavioral: Negative. Objective:  /82 (BP Location: Left arm, Patient Position: Sitting)   Pulse 68   Resp 18   Ht 6' 2" (1.88 m)   Wt 108 kg (239 lb)   SpO2 96%   BMI 30.69 kg/m²      Physical Exam  Constitutional:       Appearance: Normal appearance. HENT:      Head: Normocephalic and atraumatic. Nose: Nose normal. No rhinorrhea. Eyes:      Extraocular Movements: Extraocular movements intact. Pupils: Pupils are equal, round, and reactive to light. Cardiovascular:      Rate and Rhythm: Normal rate and regular rhythm. Pulses:           Radial pulses are 2+ on the left side. Femoral pulses are 2+ on the right side and 2+ on the left side. Dorsalis pedis pulses are detected w/ Doppler on the right side and detected w/ Doppler on the left side. Posterior tibial pulses are detected w/ Doppler on the right side and detected w/ Doppler on the left side. Pulmonary:      Effort: Pulmonary effort is normal.      Breath sounds: No stridor. Abdominal:      General: There is no distension. Tenderness: There is no abdominal tenderness. Musculoskeletal:         General: No tenderness. Normal range of motion. Cervical back: Normal range of motion and neck supple. Skin:     General: Skin is warm. Capillary Refill: Capillary refill takes less than 2 seconds. Coloration: Skin is not jaundiced. Neurological:      General: No focal deficit present. Mental Status: He is alert and oriented to person, place, and time.    Psychiatric:         Mood and Affect: Mood normal.         Behavior: Behavior normal.         Operative Scheduling Information:    [place]  [facility] [place] [facility] [place] [facility] [place]    Physician:  Me    Surgery: Right lower extremity arteriogram with CO2 via left brachial artery cutdown    Urgency:  Standard    Level:  Level 3: Outpatients to be scheduled for elective surgery than can be delayed up to 4 weeks without reasonable expectation of detriment to patient    Case Length:  Normal    Post-op Bed:  Outpatient    OR Table:  Discovery    Equipment Needs:  None    Medication Instructions:  Aspirin:   Continue (do not hold)  Plavix:  Continue (do not hold)    Hydration:  50 cc/hr for 4 hour prior and 4 hours after procedure

## 2023-10-20 NOTE — TELEPHONE ENCOUNTER
Verified patient's insurance   CONFIRMED - Patient's insurance is [employer]uj[employer]  Is patient requesting a call when authorization has been obtained? Patient did not request a call. Surgery Date: 11-1-23  Primary Surgeon: VERNON // Leonardo Samuel (NPI: [id number])  Assisting Surgeon: Not Applicable (N/A)  [place] [facility] [place] [id number] / NPI: [id number])  Inpatient / Outpatient: Outpatient  Level: 3    Clearance Received: No clearance ordered. Consent Received: Yes, scanned into Epic on 10-20-23. Medication Hold / Last Dose:  No Hold on ASA or Plavix  IR Notified: Not Applicable (N/A)  Rep. Notified: Not Applicable (N/A)  Equipment Needs:  CO2  Vas Lab Requested: Not Applicable (N/A)  Patient Contacted: 10-20-23    Diagnosis: E11.62  Procedure/ CPT Code(s): Angiogram // CPT: L5573120, [id number], and 21069 // Procedure to take place in OR [Auth/ Cert Based]    For varicose vein related procedures:   Last LEVDR: Not Applicable (N/A)  CEAP Classification: Not Applicable (N/A)  VCSS: Not Applicable (N/A)    Post Operative Date/ Time: TBD     *Please review medication hold(s), PATs, and check H&P with patient. *  PATIENT WAS MAILED SURGERY/SHOWERING/DISCHARGE/COVID INSTRUCTIONS AFTER REVIEWING WITH THEM VIA PHONE CALL.       Spoke to patient to schedule LLF

## 2023-10-20 NOTE — H&P (VIEW-ONLY)
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 He stepped on a nail in April and has failed to heal.  Noninvasive arterial studies suggest toe pressure inadequate for healing and flatline waveforms. He has bilateral femoral pulses. I proposed a right lower extremity arteriogram for evaluation and potential intervention and treatment of his foot wound. The patient is in agreement and signed consent in the office. Lab Results   Component Value Date    HGBA1C 7.0 (H) 10/04/2023     Subjective:      Patient ID: Ade Javed is a 78 y.o. male. Patient had NILS on 9/20/23. Pt states that he has a wound on the bottom of his Rt foot for the past 4-5 months. Pt denies claudication. Pt has occasional cramping pain in the legs at night. HPI  The patient is an 78-year-old male who has longstanding diabetes with a currently controlled A1c of 7.0 and a history of tobacco abuse quitting in the 1980s. He suffered a wound to his foot in April which has failed to heal.  He follows with a podiatrist Dr. Moon Hollis who appropriately requested outpatient noninvasive arterial studies. These noninvasive arterial studies demonstrated inadequate healing pressures for diabetic in his foot as well as flatline waveforms in the lower extremity. The patient describes a constant pain in his right foot which is worsened when lying flat as well as with weightbearing on the heel. He has tried local wound care which has not been successful. The pain does not radiate. He is very active and ambulatory in his daily life and is moving in to his daughter's home to help with her family. His son-in-law and wife had recently passed away. Review of Systems   Constitutional: Negative. HENT: Negative. Eyes: Negative. Respiratory:  Positive for shortness of breath (with activity). Gastrointestinal: Negative. Endocrine: Negative. Genitourinary: Negative. Musculoskeletal:  Positive for arthralgias and gait problem. Skin:  Positive for color change and wound. Allergic/Immunologic: Negative. Neurological:  Positive for numbness (feet). Hematological: Negative. Psychiatric/Behavioral: Negative. Objective:  /82 (BP Location: Left arm, Patient Position: Sitting)   Pulse 68   Resp 18   Ht 6' 2" (1.88 m)   Wt 108 kg (239 lb)   SpO2 96%   BMI 30.69 kg/m²      Physical Exam  Constitutional:       Appearance: Normal appearance. HENT:      Head: Normocephalic and atraumatic. Nose: Nose normal. No rhinorrhea. Eyes:      Extraocular Movements: Extraocular movements intact. Pupils: Pupils are equal, round, and reactive to light. Cardiovascular:      Rate and Rhythm: Normal rate and regular rhythm. Pulses:           Radial pulses are 2+ on the left side. Femoral pulses are 2+ on the right side and 2+ on the left side. Dorsalis pedis pulses are detected w/ Doppler on the right side and detected w/ Doppler on the left side. Posterior tibial pulses are detected w/ Doppler on the right side and detected w/ Doppler on the left side. Pulmonary:      Effort: Pulmonary effort is normal.      Breath sounds: No stridor. Abdominal:      General: There is no distension. Tenderness: There is no abdominal tenderness. Musculoskeletal:         General: No tenderness. Normal range of motion. Cervical back: Normal range of motion and neck supple. Skin:     General: Skin is warm. Capillary Refill: Capillary refill takes less than 2 seconds. Coloration: Skin is not jaundiced. Neurological:      General: No focal deficit present. Mental Status: He is alert and oriented to person, place, and time.    Psychiatric:         Mood and Affect: Mood normal.         Behavior: Behavior normal.         Operative Scheduling Information:    Hospital:  [facility] ([place])    Physician:  Me    Surgery: Right lower extremity arteriogram with CO2 via left brachial artery cutdown    Urgency:  Standard    Level:  Level 3: Outpatients to be scheduled for elective surgery than can be delayed up to 4 weeks without reasonable expectation of detriment to patient    Case Length:  Normal    Post-op Bed:  Outpatient    OR Table:  Discovery    Equipment Needs:  None    Medication Instructions:  Aspirin:   Continue (do not hold)  Plavix:  Continue (do not hold)    Hydration:  50 cc/hr for 4 hour prior and 4 hours after procedure

## 2023-10-20 NOTE — PATIENT INSTRUCTIONS
1. 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 He stepped on a nail in April and has failed to heal.  Noninvasive arterial studies suggest toe pressure inadequate for healing and flatline waveforms. He has bilateral femoral pulses. I proposed a right lower extremity arteriogram for evaluation and potential intervention and treatment of his foot wound. The patient is in agreement and signed consent in the office.   Lab Results   Component Value Date    HGBA1C 7.0 (H) 10/04/2023       Orders:  -     Case request operating room: ARTERIOGRAM; Standing  -     Case request operating room: ARTERIOGRAM    2. Diabetes mellitus type 2 with peripheral artery disease ([address])  -     Ambulatory Referral to Vascular Surgery

## 2023-10-20 NOTE — ASSESSMENT & PLAN NOTE
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 He stepped on a nail in April and has failed to heal.  Noninvasive arterial studies suggest toe pressure inadequate for healing and flatline waveforms. He has bilateral femoral pulses. I proposed a right lower extremity arteriogram for evaluation and potential intervention and treatment of his foot wound. The patient is in agreement and signed consent in the office.   Lab Results   Component Value Date    HGBA1C 7.0 (H) 10/04/2023

## 2023-10-22 ENCOUNTER — RA CDI HCC (OUTPATIENT)
Dept: OTHER | Facility: HOSPITAL | Age: 78
End: 2023-10-22

## 2023-10-22 NOTE — PROGRESS NOTES
I13.0, E11.51, N18.32  [address]  coding opportunities          Chart Reviewed number of suggestions sent to Provider: 3     Patients Insurance     Medicare Insurance: [employer]

## 2023-10-23 ENCOUNTER — TELEPHONE (OUTPATIENT)
Age: 78
End: 2023-10-23

## 2023-10-23 NOTE — PRE-PROCEDURE INSTRUCTIONS
Pre-Surgery Instructions:   Medication Instructions    amLODIPine (NORVASC) 10 mg tablet Take day of surgery. aspirin 81 mg chewable tablet Instructions provided by MD    atorvastatin (LIPITOR) 40 mg tablet Take night before surgery    cloNIDine (CATAPRES) 0.1 mg tablet Take day of surgery. clopidogrel (PLAVIX) 75 mg tablet Instructions provided by MD    fenofibrate 160 MG tablet Take day of surgery. gabapentin (NEURONTIN) 600 MG tablet Take day of surgery. glimepiride (AMARYL) 2 mg tablet Hold day of surgery. Insulin Glargine Solostar (Lantus SoloStar) 100 UNIT/ML SOPN Take night before surgery    isosorbide mononitrate (IMDUR) 60 mg 24 hr tablet Take day of surgery. Jardiance 25 MG TABS Stop taking 4 days prior to surgery. including DOS    metFORMIN (GLUCOPHAGE) 500 mg tablet Hold day of surgery. metoprolol tartrate (LOPRESSOR) 50 mg tablet Take day of surgery. Multiple Vitamins-Minerals (MULTIVITAMIN MEN 50+ PO) Stop taking 7 days prior to surgery. nitroglycerin (NITROSTAT) 0.4 mg SL tablet PRN    Omega-3 Fatty Acids (fish oil) 1,000 mg Stop taking 7 days prior to surgery. pentoxifylline (TRENtal) 400 mg ER tablet Instructions provided by MD    ranolazine (RANEXA) 500 mg 12 hr tablet Take day of surgery. sitaGLIPtin (JANUVIA) 100 mg tablet Hold day of surgery. torsemide (DEMADEX) 20 mg tablet Hold day of surgery. Medication instructions for day surgery reviewed. Please use only a sip of water to take your instructed medications. Avoid all over the counter vitamins, supplements and NSAIDS for one week prior to surgery per anesthesia guidelines. Tylenol is ok to take as needed. You will receive a call one business day prior to surgery with an arrival time and hospital directions. If your surgery is scheduled on a Monday, the hospital will be calling you on the Friday prior to your surgery.  If you have not heard from anyone by 8pm, please call the hospital supervisor through the hospital  at [phone]. Shell Valenzuela [phone]). Do not eat or drink anything after midnight the night before your surgery, including candy, mints, lifesavers, or chewing gum. Do not drink alcohol 24hrs before your surgery. Try not to smoke at least 24hrs before your surgery. Follow the pre surgery showering instructions as listed in the [facility] Experience Booklet” or otherwise provided by your surgeon's office. Do not shave the surgical area 24 hours before surgery. Do not apply any lotions, creams, including makeup, cologne, deodorant, or perfumes after showering on the day of your surgery. No contact lenses, eye make-up, or artificial eyelashes. Remove nail polish, including gel polish, and any artificial, gel, or acrylic nails if possible. Remove all jewelry including rings and body piercing jewelry. Wear causal clothing that is easy to take on and off. Consider your type of surgery. Keep any valuables, jewelry, piercings at home. Please bring any specially ordered equipment (sling, braces) if indicated. Arrange for a responsible person to drive you to and from the hospital on the day of your surgery. Visitor Guidelines discussed. Call the surgeon's office with any new illnesses, exposures, or additional questions prior to surgery. Please reference your [facility] Experience Booklet” for additional information to prepare for your upcoming surgery.

## 2023-10-24 ENCOUNTER — APPOINTMENT (OUTPATIENT)
Dept: LAB | Facility: CLINIC | Age: 78
End: 2023-10-24
Payer: COMMERCIAL

## 2023-10-24 DIAGNOSIS — E11.621 DIABETIC ULCER OF RIGHT MIDFOOT ASSOCIATED WITH TYPE 2 DIABETES MELLITUS, LIMITED TO BREAKDOWN OF SKIN (HCC): Primary | ICD-10-CM

## 2023-10-24 DIAGNOSIS — L97.411 DIABETIC ULCER OF RIGHT MIDFOOT ASSOCIATED WITH TYPE 2 DIABETES MELLITUS, LIMITED TO BREAKDOWN OF SKIN (HCC): ICD-10-CM

## 2023-10-24 DIAGNOSIS — E11.621 DIABETIC ULCER OF RIGHT MIDFOOT ASSOCIATED WITH TYPE 2 DIABETES MELLITUS, LIMITED TO BREAKDOWN OF SKIN (HCC): ICD-10-CM

## 2023-10-24 DIAGNOSIS — L97.411 DIABETIC ULCER OF RIGHT MIDFOOT ASSOCIATED WITH TYPE 2 DIABETES MELLITUS, LIMITED TO BREAKDOWN OF SKIN (HCC): Primary | ICD-10-CM

## 2023-10-24 LAB
ANION GAP SERPL CALCULATED.3IONS-SCNC: 7 MMOL/L
BUN SERPL-MCNC: 37 MG/DL (ref 5–25)
CALCIUM SERPL-MCNC: 10 MG/DL (ref 8.4–10.2)
CHLORIDE SERPL-SCNC: 103 MMOL/L (ref 96–108)
CO2 SERPL-SCNC: 31 MMOL/L (ref 21–32)
CREAT SERPL-MCNC: 1.89 MG/DL (ref 0.6–1.3)
ERYTHROCYTE [DISTWIDTH] IN BLOOD BY AUTOMATED COUNT: 13.7 % (ref 11.6–15.1)
GFR SERPL CREATININE-BSD FRML MDRD: 32 ML/MIN/1.73SQ M
GLUCOSE SERPL-MCNC: 205 MG/DL (ref 65–140)
HCT VFR BLD AUTO: 47 % (ref 36.5–49.3)
HGB BLD-MCNC: 14.7 G/DL (ref 12–17)
INR PPP: 1.1 (ref 0.84–1.19)
MCH RBC QN AUTO: 27.9 PG (ref 26.8–34.3)
MCHC RBC AUTO-ENTMCNC: 31.3 G/DL (ref 31.4–37.4)
MCV RBC AUTO: 89 FL (ref 82–98)
PLATELET # BLD AUTO: 236 THOUSANDS/UL (ref 149–390)
PMV BLD AUTO: 11.7 FL (ref 8.9–12.7)
POTASSIUM SERPL-SCNC: 4.2 MMOL/L (ref 3.5–5.3)
PROTHROMBIN TIME: 14.1 SECONDS (ref 11.6–14.5)
RBC # BLD AUTO: 5.27 MILLION/UL (ref 3.88–5.62)
SODIUM SERPL-SCNC: 141 MMOL/L (ref 135–147)
WBC # BLD AUTO: 7.25 THOUSAND/UL (ref 4.31–10.16)

## 2023-10-24 PROCEDURE — 80048 BASIC METABOLIC PNL TOTAL CA: CPT

## 2023-10-24 PROCEDURE — 36415 COLL VENOUS BLD VENIPUNCTURE: CPT

## 2023-10-24 PROCEDURE — 85610 PROTHROMBIN TIME: CPT

## 2023-10-24 PROCEDURE — 85027 COMPLETE CBC AUTOMATED: CPT

## 2023-10-24 NOTE — TELEPHONE ENCOUNTER
Authorization requirements reviewed. Please refer to [facility] Dumont / [facility] number [id number] for case updates.     No authorization required

## 2023-10-26 ENCOUNTER — HOSPITAL ENCOUNTER (OUTPATIENT)
Dept: MRI IMAGING | Facility: HOSPITAL | Age: 78
Discharge: HOME | End: 2023-10-26
Payer: COMMERCIAL

## 2023-10-26 DIAGNOSIS — E11.621 DIABETIC ULCER OF RIGHT HEEL ASSOCIATED WITH TYPE 2 DIABETES MELLITUS, LIMITED TO BREAKDOWN OF SKIN (HCC): ICD-10-CM

## 2023-10-26 DIAGNOSIS — L97.411 DIABETIC ULCER OF RIGHT HEEL ASSOCIATED WITH TYPE 2 DIABETES MELLITUS, LIMITED TO BREAKDOWN OF SKIN (HCC): ICD-10-CM

## 2023-10-26 DIAGNOSIS — M86.271 SUBACUTE OSTEOMYELITIS OF RIGHT FOOT (HCC): ICD-10-CM

## 2023-10-26 PROCEDURE — G1004 CDSM NDSC: HCPCS

## 2023-10-26 PROCEDURE — 73721 MRI JNT OF LWR EXTRE W/O DYE: CPT

## 2023-10-30 ENCOUNTER — TELEPHONE (OUTPATIENT)
Age: 78
End: 2023-10-30

## 2023-10-30 NOTE — TELEPHONE ENCOUNTER
Patient advised of MRI results. Negative osteomyelitis. Patient urged to follow-up with vascular surgery.

## 2023-10-31 ENCOUNTER — ANESTHESIA EVENT (OUTPATIENT)
Dept: PERIOP | Facility: HOSPITAL | Age: 78
End: 2023-10-31
Payer: COMMERCIAL

## 2023-10-31 ENCOUNTER — OFFICE VISIT (OUTPATIENT)
Dept: FAMILY MEDICINE CLINIC | Facility: CLINIC | Age: 78
End: 2023-10-31
Payer: COMMERCIAL

## 2023-10-31 VITALS
WEIGHT: 239 LBS | OXYGEN SATURATION: 98 % | HEART RATE: 58 BPM | RESPIRATION RATE: 18 BRPM | HEIGHT: 74 IN | SYSTOLIC BLOOD PRESSURE: 126 MMHG | BODY MASS INDEX: 30.67 KG/M2 | TEMPERATURE: 97.6 F | DIASTOLIC BLOOD PRESSURE: 70 MMHG

## 2023-10-31 DIAGNOSIS — Z23 ENCOUNTER FOR IMMUNIZATION: ICD-10-CM

## 2023-10-31 DIAGNOSIS — E78.2 MIXED HYPERLIPIDEMIA: ICD-10-CM

## 2023-10-31 DIAGNOSIS — E11.42 TYPE 2 DIABETES MELLITUS WITH DIABETIC POLYNEUROPATHY, WITHOUT LONG-TERM CURRENT USE OF INSULIN (HCC): ICD-10-CM

## 2023-10-31 DIAGNOSIS — N18.30 BENIGN HYPERTENSION WITH CKD (CHRONIC KIDNEY DISEASE) STAGE III (HCC): ICD-10-CM

## 2023-10-31 DIAGNOSIS — E11.42 TYPE 2 DIABETES MELLITUS WITH DIABETIC POLYNEUROPATHY, WITH LONG-TERM CURRENT USE OF INSULIN (HCC): Primary | ICD-10-CM

## 2023-10-31 DIAGNOSIS — I25.10 CORONARY ARTERY DISEASE INVOLVING NATIVE CORONARY ARTERY OF NATIVE HEART WITHOUT ANGINA PECTORIS: ICD-10-CM

## 2023-10-31 DIAGNOSIS — Z23 NEED FOR VACCINATION: ICD-10-CM

## 2023-10-31 DIAGNOSIS — Z79.4 TYPE 2 DIABETES MELLITUS WITH DIABETIC POLYNEUROPATHY, WITH LONG-TERM CURRENT USE OF INSULIN (HCC): Primary | ICD-10-CM

## 2023-10-31 DIAGNOSIS — N18.32 STAGE 3B CHRONIC KIDNEY DISEASE (HCC): ICD-10-CM

## 2023-10-31 DIAGNOSIS — I12.9 BENIGN HYPERTENSION WITH CKD (CHRONIC KIDNEY DISEASE) STAGE III (HCC): ICD-10-CM

## 2023-10-31 PROBLEM — R11.0 NAUSEA: Status: RESOLVED | Noted: 2023-06-19 | Resolved: 2023-10-31

## 2023-10-31 PROCEDURE — 1160F RVW MEDS BY RX/DR IN RCRD: CPT | Performed by: FAMILY MEDICINE

## 2023-10-31 PROCEDURE — 90677 PCV20 VACCINE IM: CPT

## 2023-10-31 PROCEDURE — 3288F FALL RISK ASSESSMENT DOCD: CPT | Performed by: FAMILY MEDICINE

## 2023-10-31 PROCEDURE — 1170F FXNL STATUS ASSESSED: CPT | Performed by: FAMILY MEDICINE

## 2023-10-31 PROCEDURE — G0439 PPPS, SUBSEQ VISIT: HCPCS | Performed by: FAMILY MEDICINE

## 2023-10-31 PROCEDURE — 1125F AMNT PAIN NOTED PAIN PRSNT: CPT | Performed by: FAMILY MEDICINE

## 2023-10-31 PROCEDURE — 1159F MED LIST DOCD IN RCRD: CPT | Performed by: FAMILY MEDICINE

## 2023-10-31 PROCEDURE — 1101F PT FALLS ASSESS-DOCD LE1/YR: CPT | Performed by: FAMILY MEDICINE

## 2023-10-31 PROCEDURE — 3725F SCREEN DEPRESSION PERFORMED: CPT | Performed by: FAMILY MEDICINE

## 2023-10-31 PROCEDURE — 90471 IMMUNIZATION ADMIN: CPT

## 2023-10-31 PROCEDURE — 90750 HZV VACC RECOMBINANT IM: CPT

## 2023-10-31 PROCEDURE — G0009 ADMIN PNEUMOCOCCAL VACCINE: HCPCS

## 2023-10-31 RX ORDER — CLOPIDOGREL BISULFATE 75 MG/1
75 TABLET ORAL DAILY
Qty: 90 TABLET | Refills: 0 | Status: SHIPPED | OUTPATIENT
Start: 2023-10-31

## 2023-10-31 NOTE — PROGRESS NOTES
Assessment and Plan:     Problem List Items Addressed This Visit        Endocrine    Type 2 diabetes mellitus with diabetic polyneuropathy, with long-term current use of insulin ([address]) - Primary    Relevant Orders    Albumin / creatinine urine ratio    Comprehensive metabolic panel    Hemoglobin A1C    CBC and differential       Cardiovascular and Mediastinum    Benign hypertension with CKD (chronic kidney disease) stage III (HCC)    Relevant Orders    Albumin / creatinine urine ratio    Comprehensive metabolic panel    Hemoglobin A1C    CBC and differential    Coronary artery disease involving native coronary artery of native heart without angina pectoris    Relevant Orders    Albumin / creatinine urine ratio    Comprehensive metabolic panel    Hemoglobin A1C    CBC and differential       Genitourinary    Stage 3b chronic kidney disease ([address])    Relevant Orders    Albumin / creatinine urine ratio    Comprehensive metabolic panel    Hemoglobin A1C    CBC and differential       Other    Mixed hyperlipidemia    Relevant Orders    Albumin / creatinine urine ratio    Comprehensive metabolic panel    Hemoglobin A1C    CBC and differential   Other Visit Diagnoses     Need for vaccination        Encounter for immunization        Relevant Orders    Pneumococcal Conjugate Vaccine 20-valent (Pcv20)    Zoster Vaccine Recombinant IM          Depression Screening and Follow-up Plan: Patient was screened for depression during today's encounter. They screened negative with a PHQ-2 score of 0. Preventive health issues were discussed with patient, and age appropriate screening tests were ordered as noted in patient's After Visit Summary. Personalized health advice and appropriate referrals for health education or preventive services given if needed, as noted in patient's After Visit Summary.      History of Present Illness:     Patient presents for a Medicare Wellness Visit    Pt is here for an [facility]       Patient Care Team:  Avani Hendricks DO as PCP - General (Family Medicine)  Estelle Coelho MD as Consulting Physician (Cardiology)  Lima Flores MD as Consulting Physician (Ophthalmology)  Tracy Shlel DPM as Consulting Physician (Podiatry)  Celestine Gao MD as Consulting Physician (Vascular Surgery)  Azael Hopkins MD (Nephrology)     Review of Systems:     Review of Systems     Problem List:     Patient Active Problem List   Diagnosis   • Mixed hyperlipidemia   • Benign hypertension with CKD (chronic kidney disease) stage III ([address])   • Coronary artery disease involving native coronary artery of native heart without angina pectoris   • S/P angioplasty with stent   • Hx of CABG   • S/P AAA repair   • S/P prostatectomy   • H/O prostate cancer   • Type 2 diabetes mellitus with diabetic polyneuropathy, with long-term current use of insulin ([facility])   • Varicose veins of left lower extremity   • History of endovascular stent graft for abdominal aortic aneurysm (AAA)   • Bruit (arterial)   • Peripheral artery disease ([address])   • Type 2 diabetes mellitus with diabetic peripheral angiopathy without gangrene, with long-term current use of insulin ([address])   • Actinic keratoses   • Stage 3b chronic kidney disease ([address])   • Platelets decreased ([address])   • Gross hematuria   • Chronic systolic heart failure ([address])   • Mild aortic stenosis   • Chronic kidney disease-mineral and bone disorder   • Stable angina pectoris   • Hypertensive urgency   • Elevated troponin level not due myocardial infarction   • Diabetic ulcer of right midfoot associated with type 2 diabetes mellitus, limited to breakdown of skin ([address])   • Acute on chronic diastolic heart failure ([address])   • Frequent PVCs      Past Medical and Surgical History:     Past Medical History:   Diagnosis Date   • CAD (coronary artery disease)    • Cancer ([address])     prostate   • CHF (congestive heart failure) ([address])    • Chronic kidney disease    • Diabetes mellitus ([address])    • Hyperlipidemia    • Hypertension    • Myocardial infarction [facility])      Past Surgical History:   Procedure Laterality Date   • ADENOIDECTOMY     • CARDIAC CATHETERIZATION Left 10/19/2022    Procedure: Cardiac Left Heart Cath;  Surgeon: Austin Gowers, MD;  Location: [facility]; Service: Cardiology   • CARDIAC SURGERY      3 cardiac bypass then angioplasty 2020   • CHOLECYSTECTOMY     • COLONOSCOPY     • CORONARY ARTERY BYPASS GRAFT     • PROSTATE SURGERY     • TONSILLECTOMY        Family History:     Family History   Problem Relation Age of Onset   • Diabetes Mother    • Alcohol abuse Father    • Mental illness Neg Hx       Social History:     Social History     Socioeconomic History   • Marital status:      Spouse name: None   • Number of children: None   • Years of education: None   • Highest education level: None   Occupational History   • None   Tobacco Use   • Smoking status: Former     Packs/day: 1.50     Years: 33.00     Total pack years: 49.50     Types: Cigarettes     Start date:      Quit date:      Years since quittin.8   • Smokeless tobacco: Never   Vaping Use   • Vaping Use: Never used   Substance and Sexual Activity   • Alcohol use: Yes     Alcohol/week: 14.0 standard drinks of alcohol     Types: 14 Cans of beer per week     Comment: 1 -2 daily   • Drug use: Never   • Sexual activity: None   Other Topics Concern   • None   Social History Narrative   • None     Social Determinants of Health     Financial Resource Strain: Low Risk  (10/31/2023)    Overall Financial Resource Strain (CARDIA)    • Difficulty of Paying Living Expenses: Not hard at all   Food Insecurity: Not on file   Transportation Needs: No Transportation Needs (10/31/2023)    PRAPARE - Transportation    • Lack of Transportation (Medical): No    • Lack of Transportation (Non-Medical):  No   Physical Activity: Not on file   Stress: Not on file   Social Connections: Not on file   Intimate Partner Violence: Not on file   Housing Stability: Not on file      Medications and Allergies:     Current Outpatient Medications   Medication Sig Dispense Refill   • amLODIPine (NORVASC) 10 mg tablet Take 0.5 tablets (5 mg total) by mouth daily 1 tablet 1   • aspirin 81 mg chewable tablet Chew 81 mg daily     • atorvastatin (LIPITOR) 40 mg tablet Take 1 tablet (40 mg total) by mouth daily (Patient taking differently: Take 40 mg by mouth every evening) 90 tablet 3   • Blood Glucose Monitoring Suppl (OneTouch Verio Reflect) w/Device KIT Check blood sugars twice daily. Please substitute with appropriate alternative as covered by patient's insurance. Dx: E11.65 1 kit 0   • cloNIDine (CATAPRES) 0.1 mg tablet Take 1 tablet (0.1 mg total) by mouth every 12 (twelve) hours 180 tablet 1   • Droplet Pen Needles 32G X 4 MM MISC USE EVERY EVENING 100 each 5   • fenofibrate 160 MG tablet Take 1 tablet (160 mg total) by mouth daily 90 tablet 3   • gabapentin (NEURONTIN) 600 MG tablet TAKE 1 TABLET BY MOUTH  TWICE DAILY 180 tablet 3   • glimepiride (AMARYL) 2 mg tablet Take 1 tablet (2 mg total) by mouth 2 (two) times a day 180 tablet 1   • glucose blood (OneTouch Verio) test strip Check blood sugars twice daily. Please substitute with appropriate alternative as covered by patient's insurance.  Dx: E11.65 200 each 3   • Insulin Glargine Solostar (Lantus SoloStar) 100 UNIT/ML SOPN Inject 0.15 mL (15 Units total) under the skin every evening 15 mL 0   • isosorbide mononitrate (IMDUR) 60 mg 24 hr tablet Take 1 tablet (60 mg total) by mouth daily 90 tablet 3   • Jardiance 25 MG TABS TAKE 1 TABLET BY MOUTH IN THE  MORNING 90 tablet 1   • metFORMIN (GLUCOPHAGE) 500 mg tablet TAKE 1 TABLET BY MOUTH  TWICE DAILY WITH MEALS 180 tablet 3   • metoprolol tartrate (LOPRESSOR) 50 mg tablet Take 1 tablet (50 mg total) by mouth every 12 (twelve) hours 180 tablet 3   • Multiple Vitamins-Minerals (MULTIVITAMIN MEN 50+ PO) Take by mouth daily     • nitroglycerin (NITROSTAT) 0.4 mg SL tablet Place 1 tablet (0.4 mg total) under the tongue every 5 (five) minutes as needed for chest pain 30 tablet 3   • Omega-3 Fatty Acids (fish oil) 1,000 mg Take 4,000 mg by mouth 2 (two) times a day     • OneTouch Delica Lancets 42O MISC Check blood sugars twice daily. Please substitute with appropriate alternative as covered by patient's insurance. Dx: E11.65 200 each 3   • pentoxifylline (TRENtal) 400 mg ER tablet Take 1 tablet (400 mg total) by mouth 3 (three) times a day with meals 90 tablet 1   • ranolazine (RANEXA) 500 mg 12 hr tablet take 1 tablet by mouth twice a day 60 tablet 3   • sitaGLIPtin (JANUVIA) 100 mg tablet Take 1 tablet (100 mg total) by mouth daily 90 tablet 1   • torsemide (DEMADEX) 20 mg tablet Take 1 tablet (20 mg total) by mouth daily 90 tablet 3   • clopidogrel (PLAVIX) 75 mg tablet TAKE 1 TABLET BY MOUTH DAILY 90 tablet 0     No current facility-administered medications for this visit. Allergies   Allergen Reactions   • Lisinopril Rash and Lip Swelling      Immunizations:     Immunization History   Administered Date(s) Administered   • COVID-19 MODERNA VACC 0.5 ML IM 01/15/2021, 01/15/2021, 02/16/2021, 02/16/2021   • INFLUENZA 09/16/2013, 10/14/2014, 11/02/2015, 10/04/2016, 10/01/2017, 11/12/2018, 12/09/2019, 09/15/2020   • Influenza, high dose seasonal 0.7 mL 10/14/2021   • Pneumococcal Conjugate 13-Valent 12/28/2015   • Pneumococcal Polysaccharide PPV23 10/04/2016   • Td (adult), Unspecified 04/29/2021   • Zoster 01/01/2014      Health Maintenance:         Topic Date Due   • Hepatitis C Screening  Completed         Topic Date Due   • Influenza Vaccine (1) 09/01/2023      Medicare Screening Tests and Risk Assessments:     Lawrence Evans is here for his Subsequent Wellness visit. Last Medicare Wellness visit information reviewed, patient interviewed, no change since last AWV. Health Risk Assessment:   Patient rates overall health as good.  Patient feels that their physical health rating is same. Patient is satisfied with their life. Eyesight was rated as same. Hearing was rated as same. Patient feels that their emotional and mental health rating is same. Patients states they are never, rarely angry. Patient states they are never, rarely unusually tired/fatigued. Pain experienced in the last 7 days has been some. Patient's pain rating has been 7/10. Patient states that he has experienced no weight loss or gain in last 6 months. Depression Screening:   PHQ-2 Score: 0      Fall Risk Screening: In the past year, patient has experienced: history of falling in past year    Number of falls: 1  Injured during fall?: No    Feels unsteady when standing or walking?: Yes    Worried about falling?: No      Home Safety:  Patient has trouble with stairs inside or outside of their home. Patient has working smoke alarms and has working carbon monoxide detector. Home safety hazards include: none. Nutrition:   Current diet is Regular. Medications:   Patient is not currently taking any over-the-counter supplements. Patient is able to manage medications. Activities of Daily Living (ADLs)/Instrumental Activities of Daily Living (IADLs):   Walk and transfer into and out of bed and chair?: No  Dress and groom yourself?: No    Bathe or shower yourself?: No    Feed yourself? No  Do your laundry/housekeeping?: No  Manage your money, pay your bills and track your expenses?: No  Make your own meals?: No    Do your own shopping?: No    Previous Hospitalizations:   Any hospitalizations or ED visits within the last 12 months?: Yes    How many hospitalizations have you had in the last year?: 1-2    Advance Care Planning:   Living will: Yes    Durable POA for healthcare:  Yes    Advanced directive: Yes      Cognitive Screening:   Provider or family/friend/caregiver concerned regarding cognition?: No    PREVENTIVE SCREENINGS      Cardiovascular Screening:    General: Screening Not Indicated, History Lipid Disorder and Risks and Benefits Discussed    Due for: Lipid Panel      Diabetes Screening:     General: Screening Not Indicated, History Diabetes and Risks and Benefits Discussed    Due for: Blood Glucose      Colorectal Cancer Screening:     General: Screening Not Indicated and Risks and Benefits Discussed      Prostate Cancer Screening:    General: History Prostate Cancer, Screening Not Indicated and Risks and Benefits Discussed      Osteoporosis Screening:    General: Risks and Benefits Discussed and Patient Declines      Abdominal Aortic Aneurysm (AAA) Screening:    Risk factors include: tobacco use        General: Screening Not Indicated and History AAA      Lung Cancer Screening:     General: Screening Not Indicated      Hepatitis C Screening:    General: Screening Current    Screening, Brief Intervention, and Referral to Treatment (SBIRT)    Screening  Typical number of drinks in a day: 2  Typical number of drinks in a week: 14  Interpretation: Low risk drinking behavior. Single Item Drug Screening:  How often have you used an illegal drug (including marijuana) or a prescription medication for non-medical reasons in the past year? never    Single Item Drug Screen Score: 0  Interpretation: Negative screen for possible drug use disorder    Brief Intervention  Alcohol & drug use screenings were reviewed. No concerns regarding substance use disorder identified. No results found.      Physical Exam:     /70   Pulse 58   Temp 97.6 °F (36.4 °C)   Resp 18   Ht 6' 2" (1.88 m)   Wt 108 kg (239 lb)   SpO2 98%   BMI 30.69 kg/m²     Physical Exam     Raúl Finn,

## 2023-10-31 NOTE — PATIENT INSTRUCTIONS
Medicare Preventive Visit Patient Instructions  Thank you for completing your Welcome to Medicare Visit or Medicare Annual Wellness Visit today. Your next wellness visit will be due in one year (10/31/2024). The screening/preventive services that you may require over the next 5-10 years are detailed below. Some tests may not apply to you based off risk factors and/or age. Screening tests ordered at today's visit but not completed yet may show as past due. Also, please note that scanned in results may not display below. Preventive Screenings:  Service Recommendations Previous Testing/Comments   Colorectal Cancer Screening  Colonoscopy    Fecal Occult Blood Test (FOBT)/Fecal Immunochemical Test (FIT)  Fecal DNA/Cologuard Test  Flexible Sigmoidoscopy Age: 43-73 years old   Colonoscopy: every 10 years (May be performed more frequently if at higher risk)  OR  FOBT/FIT: every 1 year  OR  Cologuard: every 3 years  OR  Sigmoidoscopy: every 5 years  Screening may be recommended earlier than age 39 if at higher risk for colorectal cancer. Also, an individualized decision between you and your healthcare provider will decide whether screening between the ages of 77-80 would be appropriate.  Colonoscopy: Not on file  FOBT/FIT: Not on file  Cologuard: Not on file  Sigmoidoscopy: Not on file          Prostate Cancer Screening Individualized decision between patient and health care provider in men between ages of 53-66   Medicare will cover every 12 months beginning on the day after your 50th birthday PSA: <0.01 ng/mL     History Prostate Cancer  Screening Not Indicated     Hepatitis C Screening Once for adults born between 1945 and 1965  More frequently in patients at high risk for Hepatitis C Hep C Antibody: 01/11/2022    Screening Current   Diabetes Screening 1-2 times per year if you're at risk for diabetes or have pre-diabetes Fasting glucose: 110 mg/dL (10/4/2023)  A1C: 7.0 % (10/4/2023)  Screening Not Indicated  History Diabetes   Cholesterol Screening Once every 5 years if you don't have a lipid disorder. May order more often based on risk factors. Lipid panel: 10/04/2023  Screening Not Indicated  History Lipid Disorder      Other Preventive Screenings Covered by Medicare:  Abdominal Aortic Aneurysm (AAA) Screening: covered once if your at risk. You're considered to be at risk if you have a family history of AAA or a male between the age of 70-76 who smoking at least 100 cigarettes in your lifetime. Lung Cancer Screening: covers low dose CT scan once per year if you meet all of the following conditions: (1) Age 48-67; (2) No signs or symptoms of lung cancer; (3) Current smoker or have quit smoking within the last 15 years; (4) You have a tobacco smoking history of at least 20 pack years (packs per day x number of years you smoked); (5) You get a written order from a healthcare provider. Glaucoma Screening: covered annually if you're considered high risk: (1) You have diabetes OR (2) Family history of glaucoma OR (3)  aged 48 and older OR (3)  American aged 72 and older  Osteoporosis Screening: covered every 2 years if you meet one of the following conditions: (1) Have a vertebral abnormality; (2) On glucocorticoid therapy for more than 3 months; (3) Have primary hyperparathyroidism; (4) On osteoporosis medications and need to assess response to drug therapy. HIV Screening: covered annually if you're between the age of 14-79. Also covered annually if you are younger than 13 and older than 72 with risk factors for HIV infection. For pregnant patients, it is covered up to 3 times per pregnancy.     Immunizations:  Immunization Recommendations   Influenza Vaccine Annual influenza vaccination during flu season is recommended for all persons aged >= 6 months who do not have contraindications   Pneumococcal Vaccine   * Pneumococcal conjugate vaccine = PCV13 (Prevnar 13), PCV15 (Vaxneuvance), PCV20 (Prevnar 20)  * Pneumococcal polysaccharide vaccine = PPSV23 (Pneumovax) Adults 72-75 yo with certain risk factors or if 69+ yo  If never received any pneumonia vaccine: recommend Prevnar 20 (PCV20)  Give PCV20 if previously received 1 dose of PCV13 or PPSV23   Hepatitis B Vaccine 3 dose series if at intermediate or high risk (ex: diabetes, end stage renal disease, liver disease)   Respiratory syncytial virus (RSV) Vaccine - COVERED BY MEDICARE PART D  * RSVPreF3 (Arexvy) CDC recommends that adults 61years of age and older may receive a single dose of RSV vaccine using shared clinical decision-making (SCDM)   Tetanus (Td) Vaccine - COST NOT COVERED BY MEDICARE PART B Following completion of primary series, a booster dose should be given every 10 years to maintain immunity against tetanus. Td may also be given as tetanus wound prophylaxis. Tdap Vaccine - COST NOT COVERED BY MEDICARE PART B Recommended at least once for all adults. For pregnant patients, recommended with each pregnancy. Shingles Vaccine (Shingrix) - COST NOT COVERED BY MEDICARE PART B  2 shot series recommended in those 19 years and older who have or will have weakened immune systems or those 50 years and older     Health Maintenance Due:      Topic Date Due   • Hepatitis C Screening  Completed     Immunizations Due:      Topic Date Due   • Influenza Vaccine (1) 09/01/2023     Advance Directives   What are advance directives? Advance directives are legal documents that state your wishes and plans for medical care. These plans are made ahead of time in case you lose your ability to make decisions for yourself. Advance directives can apply to any medical decision, such as the treatments you want, and if you want to donate organs. What are the types of advance directives? There are many types of advance directives, and each state has rules about how to use them. You may choose a combination of any of the following:  Living will:   This is a written record of the treatment you want. You can also choose which treatments you do not want, which to limit, and which to stop at a certain time. This includes surgery, medicine, IV fluid, and tube feedings. Durable power of  for [facility]): This is a written record that states who you want to make healthcare choices for you when you are unable to make them for yourself. This person, called a proxy, is usually a family member or a friend. You may choose more than 1 proxy. Do not resuscitate (DNR) order:  A DNR order is used in case your heart stops beating or you stop breathing. It is a request not to have certain forms of treatment, such as CPR. A DNR order may be included in other types of advance directives. Medical directive: This covers the care that you want if you are in a coma, near death, or unable to make decisions for yourself. You can list the treatments you want for each condition. Treatment may include pain medicine, surgery, blood transfusions, dialysis, IV or tube feedings, and a ventilator (breathing machine). Values history: This document has questions about your views, beliefs, and how you feel and think about life. This information can help others choose the care that you would choose. Why are advance directives important? An advance directive helps you control your care. Although spoken wishes may be used, it is better to have your wishes written down. Spoken wishes can be misunderstood, or not followed. Treatments may be given even if you do not want them. An advance directive may make it easier for your family to make difficult choices about your care. Fall Prevention    Fall prevention  includes ways to make your home and other areas safer. It also includes ways you can move more carefully to prevent a fall. Health conditions that cause changes in your blood pressure, vision, or muscle strength and coordination may increase your risk for falls.  Medicines may also increase your risk for falls if they make you dizzy, weak, or sleepy. Fall prevention tips:   Stand or sit up slowly. Use assistive devices as directed. Wear shoes that fit well and have soles that . Wear a personal alarm. Stay active. Manage your medical conditions. Home Safety Tips:  Add items to prevent falls in the bathroom. Keep paths clear. Install bright lights in your home. Keep items you use often on shelves within reach. Paint or place reflective tape on the edges of your stairs. Weight Management   Why it is important to manage your weight:  Being overweight increases your risk of health conditions such as heart disease, high blood pressure, type 2 diabetes, and certain types of cancer. It can also increase your risk for osteoarthritis, sleep apnea, and other respiratory problems. Aim for a slow, steady weight loss. Even a small amount of weight loss can lower your risk of health problems. How to lose weight safely:  A safe and healthy way to lose weight is to eat fewer calories and get regular exercise. You can lose up about 1 pound a week by decreasing the number of calories you eat by 500 calories each day. Healthy meal plan for weight management:  A healthy meal plan includes a variety of foods, contains fewer calories, and helps you stay healthy. A healthy meal plan includes the following:  Eat whole-grain foods more often. A healthy meal plan should contain fiber. Fiber is the part of grains, fruits, and vegetables that is not broken down by your body. Whole-grain foods are healthy and provide extra fiber in your diet. Some examples of whole-grain foods are whole-wheat breads and pastas, oatmeal, brown rice, and bulgur. Eat a variety of vegetables every day. Include dark, leafy greens such as spinach, kale, deloris greens, and mustard greens. Eat yellow and orange vegetables such as carrots, sweet potatoes, and winter squash. Eat a variety of fruits every day. Choose fresh or canned fruit (canned in its own juice or light syrup) instead of juice. Fruit juice has very little or no fiber. Eat low-fat dairy foods. Drink fat-free (skim) milk or 1% milk. Eat fat-free yogurt and low-fat cottage cheese. Try low-fat cheeses such as mozzarella and other reduced-fat cheeses. Choose meat and other protein foods that are low in fat. Choose beans or other legumes such as split peas or lentils. Choose fish, skinless poultry (chicken or turkey), or lean cuts of red meat (beef or pork). Before you cook meat or poultry, cut off any visible fat. Use less fat and oil. Try baking foods instead of frying them. Add less fat, such as margarine, sour cream, regular salad dressing and mayonnaise to foods. Eat fewer high-fat foods. Some examples of high-fat foods include french fries, doughnuts, ice cream, and cakes. Eat fewer sweets. Limit foods and drinks that are high in sugar. This includes candy, cookies, regular soda, and sweetened drinks. Exercise:  Exercise at least 30 minutes per day on most days of the week. Some examples of exercise include walking, biking, dancing, and swimming. You can also fit in more physical activity by taking the stairs instead of the elevator or parking farther away from stores. Ask your healthcare provider about the best exercise plan for you. © Copyright [employer] 2018 Information is for End User's use only and may not be sold, redistributed or otherwise used for commercial purposes.  All illustrations and images included in CareNotes® are the copyrighted property of [employer] Inc. or [address] [place] [address]

## 2023-11-01 ENCOUNTER — APPOINTMENT (OUTPATIENT)
Dept: RADIOLOGY | Facility: HOSPITAL | Age: 78
End: 2023-11-01
Payer: COMMERCIAL

## 2023-11-01 ENCOUNTER — HOSPITAL ENCOUNTER (OUTPATIENT)
Facility: HOSPITAL | Age: 78
Setting detail: OUTPATIENT SURGERY
Discharge: HOME | End: 2023-11-01
Attending: SURGERY | Admitting: SURGERY
Payer: COMMERCIAL

## 2023-11-01 ENCOUNTER — TELEPHONE (OUTPATIENT)
Dept: VASCULAR SURGERY | Facility: CLINIC | Age: 78
End: 2023-11-01

## 2023-11-01 ENCOUNTER — TRANSCRIBE ORDERS (OUTPATIENT)
Dept: VASCULAR SURGERY | Facility: CLINIC | Age: 78
End: 2023-11-01

## 2023-11-01 ENCOUNTER — ANESTHESIA (OUTPATIENT)
Dept: PERIOP | Facility: HOSPITAL | Age: 78
End: 2023-11-01
Payer: COMMERCIAL

## 2023-11-01 VITALS
TEMPERATURE: 97.6 F | BODY MASS INDEX: 30.67 KG/M2 | SYSTOLIC BLOOD PRESSURE: 147 MMHG | OXYGEN SATURATION: 97 % | WEIGHT: 239 LBS | HEIGHT: 74 IN | HEART RATE: 54 BPM | RESPIRATION RATE: 19 BRPM | DIASTOLIC BLOOD PRESSURE: 56 MMHG

## 2023-11-01 DIAGNOSIS — E11.621 DIABETIC ULCER OF RIGHT MIDFOOT ASSOCIATED WITH TYPE 2 DIABETES MELLITUS, LIMITED TO BREAKDOWN OF SKIN (HCC): ICD-10-CM

## 2023-11-01 DIAGNOSIS — L97.411 DIABETIC ULCER OF RIGHT MIDFOOT ASSOCIATED WITH TYPE 2 DIABETES MELLITUS, LIMITED TO BREAKDOWN OF SKIN (HCC): ICD-10-CM

## 2023-11-01 DIAGNOSIS — I73.9 PERIPHERAL ARTERY DISEASE (HCC): Primary | ICD-10-CM

## 2023-11-01 LAB
BASE EXCESS BLDA CALC-SCNC: -1 MMOL/L (ref -2–3)
CA-I BLD-SCNC: 1.14 MMOL/L (ref 1.12–1.32)
GLUCOSE SERPL-MCNC: 120 MG/DL (ref 65–140)
GLUCOSE SERPL-MCNC: 125 MG/DL (ref 65–140)
GLUCOSE SERPL-MCNC: 131 MG/DL (ref 65–140)
HCO3 BLDA-SCNC: 23.9 MMOL/L (ref 22–28)
HCT VFR BLD CALC: 33 % (ref 36.5–49.3)
HGB BLDA-MCNC: 11.2 G/DL (ref 12–17)
KCT BLD-ACNC: 144 SEC (ref 89–137)
PCO2 BLD: 25 MMOL/L (ref 21–32)
PCO2 BLD: 41.7 MM HG (ref 36–44)
PH BLD: 7.37 [PH] (ref 7.35–7.45)
PO2 BLD: 132 MM HG (ref 75–129)
POTASSIUM BLD-SCNC: 3.6 MMOL/L (ref 3.5–5.3)
SAO2 % BLD FROM PO2: 99 % (ref 60–85)
SODIUM BLD-SCNC: 145 MMOL/L (ref 136–145)
SPECIMEN SOURCE: ABNORMAL
SPECIMEN SOURCE: ABNORMAL

## 2023-11-01 PROCEDURE — C1769 GUIDE WIRE: HCPCS | Performed by: SURGERY

## 2023-11-01 PROCEDURE — 76937 US GUIDE VASCULAR ACCESS: CPT

## 2023-11-01 PROCEDURE — C1894 INTRO/SHEATH, NON-LASER: HCPCS | Performed by: SURGERY

## 2023-11-01 PROCEDURE — 75710 ARTERY X-RAYS ARM/LEG: CPT | Performed by: SURGERY

## 2023-11-01 PROCEDURE — C1760 CLOSURE DEV, VASC: HCPCS | Performed by: SURGERY

## 2023-11-01 PROCEDURE — 82947 ASSAY GLUCOSE BLOOD QUANT: CPT

## 2023-11-01 PROCEDURE — 82330 ASSAY OF CALCIUM: CPT

## 2023-11-01 PROCEDURE — NC001 PR NO CHARGE: Performed by: SURGERY

## 2023-11-01 PROCEDURE — 84132 ASSAY OF SERUM POTASSIUM: CPT

## 2023-11-01 PROCEDURE — 85347 COAGULATION TIME ACTIVATED: CPT

## 2023-11-01 PROCEDURE — 84295 ASSAY OF SERUM SODIUM: CPT

## 2023-11-01 PROCEDURE — 36140 INTRO NDL ICATH UPR/LXTR ART: CPT | Performed by: SURGERY

## 2023-11-01 PROCEDURE — 85014 HEMATOCRIT: CPT

## 2023-11-01 PROCEDURE — 82803 BLOOD GASES ANY COMBINATION: CPT

## 2023-11-01 PROCEDURE — 82948 REAGENT STRIP/BLOOD GLUCOSE: CPT

## 2023-11-01 DEVICE — MYNX CONTROL VCD 5F
Type: IMPLANTABLE DEVICE | Site: ARTERIAL | Status: FUNCTIONAL
Brand: MYNX CONTROL

## 2023-11-01 RX ORDER — LIDOCAINE HYDROCHLORIDE 10 MG/ML
INJECTION, SOLUTION EPIDURAL; INFILTRATION; INTRACAUDAL; PERINEURAL AS NEEDED
Status: DISCONTINUED | OUTPATIENT
Start: 2023-11-01 | End: 2023-11-01

## 2023-11-01 RX ORDER — CHLORHEXIDINE GLUCONATE ORAL RINSE 1.2 MG/ML
15 SOLUTION DENTAL ONCE
Status: COMPLETED | OUTPATIENT
Start: 2023-11-01 | End: 2023-11-01

## 2023-11-01 RX ORDER — INSULIN GLARGINE 100 [IU]/ML
INJECTION, SOLUTION SUBCUTANEOUS
Qty: 30 ML | Refills: 3 | Status: SHIPPED | OUTPATIENT
Start: 2023-11-01

## 2023-11-01 RX ORDER — EPHEDRINE SULFATE 50 MG/ML
INJECTION INTRAVENOUS AS NEEDED
Status: DISCONTINUED | OUTPATIENT
Start: 2023-11-01 | End: 2023-11-01

## 2023-11-01 RX ORDER — HYDROMORPHONE HCL IN WATER/PF 6 MG/30 ML
0.2 PATIENT CONTROLLED ANALGESIA SYRINGE INTRAVENOUS
Status: DISCONTINUED | OUTPATIENT
Start: 2023-11-01 | End: 2023-11-01 | Stop reason: HOSPADM

## 2023-11-01 RX ORDER — DEXAMETHASONE SODIUM PHOSPHATE 10 MG/ML
8 INJECTION, SOLUTION INTRAMUSCULAR; INTRAVENOUS ONCE AS NEEDED
Status: DISCONTINUED | OUTPATIENT
Start: 2023-11-01 | End: 2023-11-01 | Stop reason: HOSPADM

## 2023-11-01 RX ORDER — SODIUM CHLORIDE, SODIUM LACTATE, POTASSIUM CHLORIDE, CALCIUM CHLORIDE 600; 310; 30; 20 MG/100ML; MG/100ML; MG/100ML; MG/100ML
INJECTION, SOLUTION INTRAVENOUS CONTINUOUS PRN
Status: DISCONTINUED | OUTPATIENT
Start: 2023-11-01 | End: 2023-11-01

## 2023-11-01 RX ORDER — FENTANYL CITRATE 50 UG/ML
INJECTION, SOLUTION INTRAMUSCULAR; INTRAVENOUS AS NEEDED
Status: DISCONTINUED | OUTPATIENT
Start: 2023-11-01 | End: 2023-11-01

## 2023-11-01 RX ORDER — HYDROMORPHONE HYDROCHLORIDE 2 MG/ML
INJECTION, SOLUTION INTRAMUSCULAR; INTRAVENOUS; SUBCUTANEOUS AS NEEDED
Status: DISCONTINUED | OUTPATIENT
Start: 2023-11-01 | End: 2023-11-01

## 2023-11-01 RX ORDER — HEPARIN SODIUM 200 [USP'U]/100ML
INJECTION, SOLUTION INTRAVENOUS
Status: COMPLETED | OUTPATIENT
Start: 2023-11-01 | End: 2023-11-01

## 2023-11-01 RX ORDER — OXYCODONE HYDROCHLORIDE 5 MG/1
5 TABLET ORAL EVERY 4 HOURS PRN
Status: DISCONTINUED | OUTPATIENT
Start: 2023-11-01 | End: 2023-11-01 | Stop reason: HOSPADM

## 2023-11-01 RX ORDER — SODIUM CHLORIDE 9 MG/ML
INJECTION, SOLUTION INTRAVENOUS CONTINUOUS PRN
Status: DISCONTINUED | OUTPATIENT
Start: 2023-11-01 | End: 2023-11-01

## 2023-11-01 RX ORDER — PROPOFOL 10 MG/ML
INJECTION, EMULSION INTRAVENOUS AS NEEDED
Status: DISCONTINUED | OUTPATIENT
Start: 2023-11-01 | End: 2023-11-01

## 2023-11-01 RX ORDER — LABETALOL HYDROCHLORIDE 5 MG/ML
INJECTION, SOLUTION INTRAVENOUS AS NEEDED
Status: DISCONTINUED | OUTPATIENT
Start: 2023-11-01 | End: 2023-11-01

## 2023-11-01 RX ORDER — CEFAZOLIN SODIUM 1 G/3ML
INJECTION, POWDER, FOR SOLUTION INTRAMUSCULAR; INTRAVENOUS AS NEEDED
Status: DISCONTINUED | OUTPATIENT
Start: 2023-11-01 | End: 2023-11-01

## 2023-11-01 RX ORDER — ONDANSETRON 2 MG/ML
INJECTION INTRAMUSCULAR; INTRAVENOUS AS NEEDED
Status: DISCONTINUED | OUTPATIENT
Start: 2023-11-01 | End: 2023-11-01

## 2023-11-01 RX ORDER — ROCURONIUM BROMIDE 10 MG/ML
INJECTION, SOLUTION INTRAVENOUS AS NEEDED
Status: DISCONTINUED | OUTPATIENT
Start: 2023-11-01 | End: 2023-11-01

## 2023-11-01 RX ORDER — FENTANYL CITRATE/PF 50 MCG/ML
25 SYRINGE (ML) INJECTION
Status: DISCONTINUED | OUTPATIENT
Start: 2023-11-01 | End: 2023-11-01 | Stop reason: HOSPADM

## 2023-11-01 RX ORDER — IODIXANOL 320 MG/ML
INJECTION, SOLUTION INTRAVASCULAR AS NEEDED
Status: DISCONTINUED | OUTPATIENT
Start: 2023-11-01 | End: 2023-11-01 | Stop reason: HOSPADM

## 2023-11-01 RX ORDER — ONDANSETRON 2 MG/ML
4 INJECTION INTRAMUSCULAR; INTRAVENOUS ONCE AS NEEDED
Status: DISCONTINUED | OUTPATIENT
Start: 2023-11-01 | End: 2023-11-01 | Stop reason: HOSPADM

## 2023-11-01 RX ADMIN — SODIUM CHLORIDE 50 ML: 0.9 INJECTION, SOLUTION INTRAVENOUS at 09:10

## 2023-11-01 RX ADMIN — FENTANYL CITRATE 100 MCG: 50 INJECTION, SOLUTION INTRAMUSCULAR; INTRAVENOUS at 12:57

## 2023-11-01 RX ADMIN — SUGAMMADEX 100 MG: 100 INJECTION, SOLUTION INTRAVENOUS at 14:34

## 2023-11-01 RX ADMIN — ROCURONIUM BROMIDE 50 MG: 10 INJECTION, SOLUTION INTRAVENOUS at 12:57

## 2023-11-01 RX ADMIN — SODIUM CHLORIDE, SODIUM LACTATE, POTASSIUM CHLORIDE, AND CALCIUM CHLORIDE: .6; .31; .03; .02 INJECTION, SOLUTION INTRAVENOUS at 12:52

## 2023-11-01 RX ADMIN — ROCURONIUM BROMIDE 30 MG: 10 INJECTION, SOLUTION INTRAVENOUS at 13:20

## 2023-11-01 RX ADMIN — PROPOFOL 150 MG: 10 INJECTION, EMULSION INTRAVENOUS at 12:57

## 2023-11-01 RX ADMIN — SODIUM CHLORIDE 8 MCG: 900 INJECTION INTRAVENOUS at 14:14

## 2023-11-01 RX ADMIN — CHLORHEXIDINE GLUCONATE 15 ML: 1.2 SOLUTION ORAL at 10:00

## 2023-11-01 RX ADMIN — LABETALOL HYDROCHLORIDE 10 MG: 5 INJECTION, SOLUTION INTRAVENOUS at 14:37

## 2023-11-01 RX ADMIN — ONDANSETRON 4 MG: 2 INJECTION INTRAMUSCULAR; INTRAVENOUS at 14:23

## 2023-11-01 RX ADMIN — SODIUM CHLORIDE: 0.9 INJECTION, SOLUTION INTRAVENOUS at 13:08

## 2023-11-01 RX ADMIN — CEFAZOLIN 2000 MG: 1 INJECTION, POWDER, FOR SOLUTION INTRAMUSCULAR; INTRAVENOUS at 13:24

## 2023-11-01 RX ADMIN — HYDROMORPHONE HYDROCHLORIDE 1 MG: 2 INJECTION, SOLUTION INTRAMUSCULAR; INTRAVENOUS; SUBCUTANEOUS at 13:31

## 2023-11-01 RX ADMIN — EPHEDRINE SULFATE 10 MG: 50 INJECTION INTRAVENOUS at 13:09

## 2023-11-01 RX ADMIN — PHENYLEPHRINE HYDROCHLORIDE 10 MCG/MIN: 10 INJECTION INTRAVENOUS at 13:07

## 2023-11-01 RX ADMIN — SODIUM CHLORIDE 50 ML: 0.9 INJECTION, SOLUTION INTRAVENOUS at 16:56

## 2023-11-01 RX ADMIN — EPHEDRINE SULFATE 10 MG: 50 INJECTION INTRAVENOUS at 13:36

## 2023-11-01 RX ADMIN — LIDOCAINE HYDROCHLORIDE 50 MG: 10 INJECTION, SOLUTION EPIDURAL; INFILTRATION; INTRACAUDAL; PERINEURAL at 12:57

## 2023-11-01 RX ADMIN — EPHEDRINE SULFATE 10 MG: 50 INJECTION INTRAVENOUS at 13:39

## 2023-11-01 RX ADMIN — SUGAMMADEX 200 MG: 100 INJECTION, SOLUTION INTRAVENOUS at 14:23

## 2023-11-01 NOTE — ANESTHESIA PREPROCEDURE EVALUATION
Procedure:  ARTERIOGRAM Right lower extremity arteriogram with CO2 via left brachial artery cutdown (Right: Leg Lower)    Relevant Problems   CARDIO   (+) Coronary artery disease involving native coronary artery of native heart without angina pectoris   (+) Frequent PVCs   (+) Hypertensive urgency   (+) Mild aortic stenosis   (+) Mixed hyperlipidemia   (+) Stable angina pectoris   (+) Type 2 diabetes mellitus with diabetic peripheral angiopathy without gangrene, with long-term current use of insulin (HCC)      ENDO   (+) Type 2 diabetes mellitus with diabetic peripheral angiopathy without gangrene, with long-term current use of insulin (HCC)   (+) Type 2 diabetes mellitus with diabetic polyneuropathy, with long-term current use of insulin (HCC)      /RENAL   (+) Benign hypertension with CKD (chronic kidney disease) stage III (HCC)   (+) Chronic kidney disease-mineral and bone disorder   (+) Stage 3b chronic kidney disease (HCC)      NEURO/PSYCH   (+) Stable angina pectoris        Physical Exam    Airway    Mallampati score: II  TM Distance: >3 FB  Neck ROM: full     Dental   No notable dental hx     Cardiovascular  Cardiovascular exam normal    Pulmonary  Pulmonary exam normal     Other Findings    CAD--CABG 2003, last stent a few years ago. Cath showed multiple 100% occluded arteries and grafts. Endorses dyspnea after walking one block. Denies CP however likely component of stable angina. S/p AAA repair  On ASA/plavix per vascular  SUSAN not on CPAP      Left Ventricle: Left ventricular cavity size is normal. Wall thickness is moderately increased. There is mild to moderate concentric hypertrophy. The left ventricular ejection fraction is 50%. Systolic function is mildly reduced. There is mild global hypokinesis. Diastolic function is moderately abnormal, consistent with grade II (pseudonormal) relaxation. Left atrial filling pressure is elevated.     IVS: There is abnormal septal motion consistent with post-operative status. There is sigmoid appearance of the septum. Right Ventricle: Systolic function appears normal visually. Left Atrium: The atrium is moderately dilated. Aortic Valve: The aortic valve is trileaflet. The leaflets are moderately thickened. The leaflets are moderately calcified. The leaflets exhibit normal mobility. There is mild to moderate stenosis. The aortic valve peak velocity is 3.2 m/s. The aortic valve peak gradient is 41 mmHg. The aortic valve mean gradient is 25 mmHg. Mitral Valve: There is mild regurgitation. Tricuspid Valve: There is mild regurgitation. The right ventricular systolic pressure is moderately to severely elevated at 50-60mmHg. Aorta: The aortic root is mildly dilated. Anesthesia Plan  ASA Score- 3     Anesthesia Type- general with ASA Monitors. Additional Monitors: arterial line. Airway Plan: ETT. Plan Factors-Exercise tolerance (METS): >4 METS. Chart reviewed. EKG reviewed. Existing labs reviewed. Patient summary reviewed. Patient is not a current smoker. Induction- intravenous. Postoperative Plan- Plan for postoperative opioid use. Informed Consent- Anesthetic plan and risks discussed with patient. I personally reviewed this patient with the CRNA. Discussed and agreed on the Anesthesia Plan with the CRNA. Katelyn Cuevas

## 2023-11-01 NOTE — TELEPHONE ENCOUNTER
11-1-23 Received phone call from Dr. Roc Bullard to have patient scheduled for a Fem Pop bypass patient will need B/L lower leg vein mapping which is scheduled for 11-6-23 11:00am at [facility] and Dr. Roc Bullard wants a cadaver vein 70'as well.  Patient is on the schedule for surgery 11-16-23 SLA/Hybrid LLF

## 2023-11-01 NOTE — DISCHARGE INSTR - AVS FIRST PAGE
DISCHARGE INSTRUCTIONS  ARTERIOGRAM/ANGIOPLASTY/STENT    ACTIVITY: On the evening following the procedure, you should be mostly resting. Someone should remain with you during the evening and overnight following the procedure. On the day after your procedure, limit your activity to walking. Avoid heavy lifting (no more than 15 lbs) for the first three days. Walking up steps and normal activities may be resumed as you feel ready. You should not drive a car for at least two days following discharge from the hospital. You may ride in a car. If you have any questions regarding a particular activity, please discuss with your doctor or nurse before you are discharged. DIET:  Resume your normal diet. Drink more water than usual for the next 24 hours. PROCEDURE SITE: You may have a procedure site in your groin, arm, or foot. You may have surgical glue at your procedure site. The glue is used to cover the procedure site, assist in closure, and prevent contamination. This adhesive will darken and peel away on its own within one to two weeks. Do not pick at it. You should shower daily. Wash incision daily with soap and water, but do not rub or scrub the incision; rinse thoroughly and pat dry. Do not bathe in a tub or swim for the first 2 week following your procedure or if you have any open wounds. It is normal to have some bruising, swelling or discoloration around the procedure site. IF increasing redness, pain, or a bulge develops, call our office immediately. If present, you may remove the band-aid or “steri-strips” over your procedure site after two days. If you notice any active bleeding at the site, apply pressure to the site and call our office ([phone]) or [phone]. FOLLOW UP STUDIES:  Your doctor will discuss whether further treatments or follow-up studies are necessary at your first post procedure visit.     FOLLOW UP APPOINTMENTS:  Making and keeping follow up appointments and ultrasound tests are important to your recovery. If you have difficulty making it to or keeping your follow up appointments, call the office. PLEASE HOLD YOUR METFORMIN FOR 24 HOURS AFTER PROCEDURE. RESUME 11/2/23. If you have increased pain, fever >101.5, increased drainage, redness or a bad smell at your surgery site, new coldness/numbness of your arm or leg, please call us immediately and GO directly to the ER. PLEASE CALL THE OFFICE IF YOU HAVE ANY QUESTIONS  [phone]  [phone]  [address], [place] ([place]), [address] [facility]  [address] [facility], [facility], [address]  [address] [facility], [address] [facility]  [address] [facility], [address] [facility], [facility], [address]  [address] [facility],Sixth Floor, 1st Floor, [place], [address] [facility]  [address], [address], [address], [facility], [address]  [address], [address] [facility] [place] ([place]), [address] [facility]  [address] [facility], [address] [facility], [address] [place] [address] [address] [place] [address] [address] [place]  [address] [facility], [address]

## 2023-11-01 NOTE — INTERIM OP NOTE
Interim Postoperative Note  PATIENT NAME: Gloria Tompkins  : 1945  MRN: [id number]  BE HYBRID OR ROOM 02    Surgery Date: 2023    Preop Diagnosis:  Diabetic ulcer of right midfoot associated with type 2 diabetes mellitus, limited to breakdown of skin ([address]) [E04.472, L97.411]    Post-Op Diagnosis Codes:     * Diabetic ulcer of right midfoot associated with type 2 diabetes mellitus, limited to breakdown of skin ([address]) [T48.865, L97.411]    Procedure(s) (LRB):  Right lower extremity angiogram with antegrade right CFA access     Surgeon(s) and Role:     * Leydi Oleary MD - Primary     * Sadia Green DPM - Assisting     * Brooklynn Nino DO - Fellow    Specimens:  * No specimens in log *    Estimated Blood Loss:   Minimal    Anesthesia Type:   Choice     Findings:     Vascular Quality Initiative - Peripheral Vascular Intervention     Urgency: Elective    Functional Status:  Restricted in physically strenuous activity but ambulatory and able to carry out work of a light or sedentary nature. Ambulation: Amb w/ assistance = ambulation requires use of cane, walker, person, etc    Leg Symptoms    Right: Ulcer/necrosis (gangrene): de ernestine tissue loss due to peripheral arterial disease, not due to non-healing prior amputation       Tissue Loss Severity: Grade 1, Shallow = small shallow ulcer(s) on distal leg or foot, any exposed bone is only limited to distal phalanx (ie, minor tissue loss: limb salvage possible with simple digital amputation [1 or 2 digits], or skin coverage). Infection: Grade 0, None = No symptoms or signs of infection.   Left: Asymptomatic:  documented peripheral arterial disease without symptoms of claudication or ischemic pain      Treatment of Native Artery to Maintain Bypass Patency?:  No    COVID Information  COVID Symptoms Pre-Procedure: Asymptomatic    Treatment Delayed by Pandemic: None    Access   Number of Sites: 1     Access Site 1:     Side 1: Right    Site 1: Femoral Antegrade    Access Guidance 1:U/S    Largest Sheath Size 1: 5 Fr.     Closure Device 1: MynxGrip      Number of Closure Devices: 1     Closure Device Outcome: Closure device successful         Procedure  Fluoro Time: 3.5 minutes  Contrast Volume: Visipaque 25 ml  DAP: 6.94 Gy.cm2  CO2: no  Anticoagulant: none  If Creatinine is > 1.2 or missing, SANJUANA Prophylaxis none     Treatment Details  Indication: Occlusive Disease,  None     Post Procedure  Patient currently taking: Statin, Yes      Antiplatelet Medication, Yes    Procedure Complications: No    Complications:   None      SIGNATURE: Yan Melchor DO   DATE: November 1, 2023   TIME: 2:38 PM

## 2023-11-01 NOTE — INTERVAL H&P NOTE
H&P reviewed. After examining the patient I find no changes in the patients condition since the H&P had been written. Left arm access required, cutdown. Reviewed with patient and family. Discussed diagnostic vs possible therapeutic intervention to right lower extremity. Will pre-op hydrate and use CO2 augmentation.

## 2023-11-01 NOTE — ANESTHESIA PROCEDURE NOTES
Arterial Line Insertion    Performed by: Karina Trevino CRNA  Authorized by: Shanika Rico MD  Preparation: Patient was prepped and draped in the usual sterile fashion.   Indications: multiple ABGs and hemodynamic monitoring  Orientation:  Right  Location: radial artery  Procedure Details:  Needle gauge: 20  Number of attempts: 1    Post-procedure:  Post-procedure: dressing applied  Waveform: good waveform  Post-procedure CNS: unchanged and normal  Patient tolerance: Patient tolerated the procedure well with no immediate complications and patient tolerated the procedure well with no immediate complications  Comments: Procedure by Garry Garcia MD

## 2023-11-01 NOTE — OP NOTE
DATE OF PROCEDURE: 11/01/23    PERFORMING SERVICE: Vascular and Endovascular Surgery    ATTENDING SURGEON: Edis Kidd MD    PREOPERATIVE DIAGNOSIS: Right lower extremity critical limb threatening ischemia with tissue loss    POSTOPERATIVE DIAGNOSIS: Same    PROCEDURE NAME:   Ultrasound guided access of the right common femoral artery  Diagnostic right lower extremity arteriogram with interpretation  Mynx closure device application to right common femoral arteriotomy    ANESTHESIA:general    EBL: Minimal    UOP: 0, no gilbert    IVF: 1950 cc    SPECIMENS: None     COMPLICATIONS: None    DRAINS: None    INDICATION: The patient is an 29-year-old male who stepped on a nail several months ago and developed a right foot wound. He has a known history of peripheral arterial occlusive disease and has failed to heal the wound. He has had ongoing podiatric treatment however the wound persists. He does not have rest pain. He underwent informed consent for possible left brachial access antegrade right lower extremity arteriogram.    INTRAOPERATIVE FINDINGS:   Right  Common femoral: Patent with appropriate access  SFA: Patent to the mid thigh with severe cobblestone appearing atherosclerotic burden until mid thigh occlusion with substantial collateral formation. Profunda: Patent  Popliteal: Occluded until a potential reconstitution of a segment of the below-knee popliteal artery. It is difficult to ascertain if this is the tibioperoneal trunk because the anterior tibial artery is occluded at its origin. Tibials: As noted above the anterior tibial artery is occluded at its origin. The posterior tibial artery is also occluded at its origin. The peroneal artery is the only inline flow from this tibial peroneal trunk to the level of the ankle. At the level of the ankle it has a substantial anterior collateral which fills the dorsalis pedis.   There is also collateral filling of the posterior tibial artery. ASSISTING SURGEON: Dr. Henry Nguyen DO    DESCRIPTION OF PROCEDURE:   The patient was transported to the endovascular suite where a timeout was performed confirming the patient, procedure and laterality. Preoperative antibiotics were administered. The patient was prepped and draped in usual sterile surgical fashion. A second timeout was again performed. Sedation was administered. Lower extremity DP and PT signals were present. The right groin was assessed via ultrasonography and a micropuncture kit was used to cannulate the common femoral artery artery. Fluoroscopy was used to confirm wire trajectory. A microsheath was used to introduce a Bentson wire to the ipsilateral superficial femoral artery in an antegrade fashion. The microsheath was removed and replaced with a 5 Albanian sheath. An arteriogram demonstrated flow to the superficial femoral artery and profunda femoris. The remaining right lower extremity arteriogram demonstrated the above mentioned findings. Patient is long segment superficial femoral and popliteal artery occlusion were not amenable to endovascular intervention. A 5 Upper sorbian Mynx device was utilized for closure of the arteriotomy. The closure device was used successfully. After 2 minutes of post closure digital pressure no hematoma was appreciated. The patient tolerated the procedure well. A total of 26 cc of contrast was used. The patient's lower extremity vascular exam was unchanged. The patient was transported to the PACU in good condition.     Santos Ramesh MD

## 2023-11-01 NOTE — PERIOPERATIVE NURSING NOTE
Bp 113/59 hr 47 pt states his l arm feels different also stated that he had vaccines yesterday. Dr Adele Pires and Sidra ADAMS  aware.

## 2023-11-01 NOTE — PERIOPERATIVE NURSING NOTE
Pt c/o lightheadedness, seeing spots hr 44 bp 97/62 resp 16 sat 98%hob flat ivf opened  o2 2l n/c applied CLear RN and Dr Jocelyn Waters  notified  will come to see pt.

## 2023-11-01 NOTE — ANESTHESIA POSTPROCEDURE EVALUATION
Post-Op Assessment Note    CV Status:  Stable  Pain Score: 0    Pain management: adequate     Mental Status:  Alert and awake   Hydration Status:  Euvolemic   PONV Controlled:  Controlled   Airway Patency:  Patent      Post Op Vitals Reviewed: Yes      Staff: CRNA         There were no known notable events for this encounter.     BP   142/60   Temp  97.4   Pulse 53   Resp   19   SpO2   95

## 2023-11-02 NOTE — TELEPHONE ENCOUNTER
Verified patient's insurance   CONFIRMED - Patient's insurance is [employer]  Is patient requesting a call when authorization has been obtained? Patient did not request a call. Surgery Date: 11/16/23  Primary Surgeon: VERNON // Zane Richard (NPI: [id number])  Assisting Surgeon: Not Applicable (N/A)  Facility: [facility] (Tax: [id number] / NPI: [id number])  Inpatient / Outpatient: Inpatient  Level: 2    Clearance Received: No clearance ordered. Consent Received: Consent will be signed day of procedure. Medication Hold / Last Dose: Medication Instructions:  Aspirin:   Continue (do not hold)  Plavix:  Continue (do not hold)    IR Notified:  EMAIL SENT 11/2/23  Rep. Notified:  Curtis Otoole EMAILED 11/2/23  Equipment Needs:  CADAVER VEIN 79 INCHES  Vas Lab Requested: Not Applicable (N/A)  Patient Contacted: 11/2/23    Diagnosis: Diabetic ulcer of right midfoot associated with type 2 diabetes mellitus, limited to breakdown of skin ([address]) [D28.904, L97.411]    Procedure/ CPT Code(s): BYPASS - Femoral-Popliteal // CPT: 00268    For varicose vein related procedures:   Last LEVDR: Not Applicable (N/A)  CEAP Classification: Not Applicable (N/A)  VCSS: Not Applicable (N/A)    Post Operative Date/ Time: 11/30/23 , 1045AM Dot with Zane Richard (NPI: [id number])     *Please review medication hold(s), PATs, and check H&P with patient. *  PATIENT WAS MAILED SURGERY/SHOWERING/DISCHARGE/COVID INSTRUCTIONS AFTER REVIEWING WITH THEM VIA PHONE CALL.

## 2023-11-03 ENCOUNTER — TELEPHONE (OUTPATIENT)
Dept: VASCULAR SURGERY | Facility: CLINIC | Age: 78
End: 2023-11-03

## 2023-11-03 ENCOUNTER — PREP FOR PROCEDURE (OUTPATIENT)
Dept: VASCULAR SURGERY | Facility: CLINIC | Age: 78
End: 2023-11-03

## 2023-11-03 ENCOUNTER — ANESTHESIA EVENT (OUTPATIENT)
Dept: PERIOP | Facility: HOSPITAL | Age: 78
DRG: 253 | End: 2023-11-03
Payer: COMMERCIAL

## 2023-11-03 DIAGNOSIS — L97.411 DIABETIC ULCER OF RIGHT MIDFOOT ASSOCIATED WITH TYPE 2 DIABETES MELLITUS, LIMITED TO BREAKDOWN OF SKIN (HCC): Primary | ICD-10-CM

## 2023-11-03 DIAGNOSIS — E11.621 DIABETIC ULCER OF RIGHT MIDFOOT ASSOCIATED WITH TYPE 2 DIABETES MELLITUS, LIMITED TO BREAKDOWN OF SKIN (HCC): Primary | ICD-10-CM

## 2023-11-03 NOTE — TELEPHONE ENCOUNTER
Spoke to patient to let him know that he has a vein mapping appt on 11-6-23 at [facility] @ 11:00am patient was given date of [facility] 11-16-23 will ask Dr. Sudhakar Lugo about CC and medication Holds LLF

## 2023-11-03 NOTE — TELEPHONE ENCOUNTER
Authorization requirements reviewed. Please refer to Erma Gresham / Iglesia Boxer number [id number] for case updates.

## 2023-11-03 NOTE — TELEPHONE ENCOUNTER
Hello All     Patient has been scheduled for a Fem Pop bypass on 11-16-23 with Dr. Ector Sánchez and is in need of cardiac clearance prior to surgery patient was last seen by Dr. Adrian Fischer on 10-17-23 can you please let us know if Dr. Adrian Fischer wants to see patient or would he clear him from last visit?     Please advise     Thanks  Godwin Nguyen

## 2023-11-06 ENCOUNTER — HOSPITAL ENCOUNTER (OUTPATIENT)
Dept: NON INVASIVE DIAGNOSTICS | Facility: HOSPITAL | Age: 78
Discharge: HOME | End: 2023-11-06
Attending: SURGERY
Payer: COMMERCIAL

## 2023-11-06 DIAGNOSIS — I73.9 PERIPHERAL ARTERY DISEASE (HCC): ICD-10-CM

## 2023-11-06 DIAGNOSIS — L97.411 DIABETIC ULCER OF RIGHT MIDFOOT ASSOCIATED WITH TYPE 2 DIABETES MELLITUS, LIMITED TO BREAKDOWN OF SKIN (HCC): ICD-10-CM

## 2023-11-06 DIAGNOSIS — E11.621 DIABETIC ULCER OF RIGHT MIDFOOT ASSOCIATED WITH TYPE 2 DIABETES MELLITUS, LIMITED TO BREAKDOWN OF SKIN (HCC): ICD-10-CM

## 2023-11-06 PROCEDURE — 93971 EXTREMITY STUDY: CPT

## 2023-11-06 PROCEDURE — 93971 EXTREMITY STUDY: CPT | Performed by: SURGERY

## 2023-11-07 DIAGNOSIS — I49.3 PVC (PREMATURE VENTRICULAR CONTRACTION): ICD-10-CM

## 2023-11-07 RX ORDER — RANOLAZINE 500 MG/1
TABLET, EXTENDED RELEASE ORAL
Qty: 60 TABLET | Refills: 3 | Status: ON HOLD | OUTPATIENT
Start: 2023-11-07

## 2023-11-07 NOTE — TELEPHONE ENCOUNTER
Pre. Op. Clearance note- Cardiology    Martha Posada   78 y.o.  male  1945      DO Thomas Miguel :     Patient's chart was reviewed for preop clearance. Patient was seen in our office on 10/17/2023. Patient has past medical history significant for   1. Coronary artery disease involving native coronary artery of native heart without angina pectoris    2. Benign hypertension with CKD (chronic kidney disease) stage III ([facility])    3. Stable angina pectoris    4. Chronic systolic heart failure ([address])    5. Mild aortic stenosis    6. Type 2 diabetes mellitus with diabetic polyneuropathy, with long-term current use of insulin ([address])    Patient is now scheduled for Fem Pop bypass on 11-16-23 . Patient has no clinical evidence of  active heart failure or  active ischemia or active arrhythmia. Patient's last cardiac workup including cardiac catheterization and echo reports were reviewed and it shows coronary artery disease which was severe three-vessel with complete occlusion of 100% RCA,, circumflex a but his LIMA to LAD was patent and circumflex stent was patent. He was recommended to be managed with medical therapy. His echo Doppler in June 2023 shows EF 50% with mild to moderate aortic stenosis mild MR and aortic root was mildly elevated and moderate pulmonary hypertension with PA pressure 50 to 60 mmHg. In my opinion patient is in optimum condition for the procedure as planned. Patient is intermediate to high risk for the surgery as planned from cardiac point of view. But there is no cardiac contraindication for this procedure. Continue current cardiac medications. Patient can hold  Aspirin for  5-7 days as required for surgery. Patient can hold Plavix for 5 days. Patient can hold Eliquis/Xarelto/Pradaxa for 3 days before the procedure.   Please restart after the procedure  immediately or next day if no contraindication form surgical point of view and advise patient to contact our office. If you have any question please do not hesitate to call us at our office of [facility]. Phone # [phone]        Lab Results   Component Value Date    WBC 7.25 10/24/2023    HGB 11.2 (L) 11/01/2023    HCT 33 (L) 11/01/2023    MCV 89 10/24/2023     10/24/2023     Lab Results   Component Value Date    CREATININE 1.89 (H) 10/24/2023     Lab Results   Component Value Date    GLUF 110 (H) 10/04/2023       Cardiac testing:   Echo from June 2023 and cardiac cath from October 2022 reviewed. Current EF now around 50%    Results for orders placed during the hospital encounter of 02/16/22    NM Myocardial Perfusion Spect (Pharmacological Induced Stress and/or Rest)    Interpretation Summary    Stress Combined Conclusion: Left ventricular perfusion is abnormal. There is mild photon reduction in the anterior wall at stress. Findings are consistent with ischemia. Additional area of infarct involving the mid and apical portion of the inferior wall. Stress Function: Left ventricular function post-stress is abnormal. Global function is mildly reduced. There were no regional wall motion abnormalities during stress. Post-stress ejection fraction is 40 %. Stress ECG: No ST deviation is noted. There were no arrhythmias during recovery. . The ECG was negative for ischemia. Perfusion: There is a left ventricular perfusion defect that is small in size with severe reduction in uptake present in the mid to apical inferior location(s) that is fixed. There is a left ventricular perfusion defect that is small to medium in size with mild reduction in uptake present in the mid to apical anterior location(s) that is reversible. Lucy Galindo MD [facility]  11/7/2023  2:58 PM      "This note was completed in part utilizing roundCorner-SideStep fluency direct voice recognition software.    Grammatical errors, random word insertion, spelling mistakes, and incomplete sentences may be an occasional consequence of the system secondary to software limitations, ambient noise and hardware issues. Please read the chart carefully and recognize, using context, where substitutions have occurred.   If you have any questions or concerns about the context, text or information contained within the body of this dictation, please contact myself, the provider, for further clarification."

## 2023-11-07 NOTE — TELEPHONE ENCOUNTER
Spoke to Kristan at patients cardiologist office to ask about clearance for patient Dr. Kristen Shukla will be in the office this afternoon and will look at clearance I also faxed clearance form to [phone] [facility]

## 2023-11-07 NOTE — TELEPHONE ENCOUNTER
Send me the clearance note. Patient has extensive cardiac work-up done. If he has no change in symptoms we can clear him for the intermediate risk.

## 2023-11-08 NOTE — PRE-PROCEDURE INSTRUCTIONS
Pre-Surgery Instructions:   Medication Instructions    amLODIPine (NORVASC) 10 mg tablet Take day of surgery. aspirin 81 mg chewable tablet Instructions provided by MD take DOS    atorvastatin (LIPITOR) 40 mg tablet Take night before surgery    cloNIDine (CATAPRES) 0.1 mg tablet Take day of surgery. clopidogrel (PLAVIX) 75 mg tablet Instructions provided by MD take DOS    fenofibrate 160 MG tablet Take day of surgery. gabapentin (NEURONTIN) 600 MG tablet Take day of surgery. glimepiride (AMARYL) 2 mg tablet Hold day of surgery. isosorbide mononitrate (IMDUR) 60 mg 24 hr tablet Take day of surgery. Jardiance 25 MG TABS Stop taking 4 days prior to surgery. Lantus SoloStar 100 units/mL SOPN Take night before surgery    metFORMIN (GLUCOPHAGE) 500 mg tablet Hold day of surgery. metoprolol tartrate (LOPRESSOR) 50 mg tablet Take day of surgery. Multiple Vitamins-Minerals (MULTIVITAMIN MEN 50+ PO) Stop taking 7 days prior to surgery. nitroglycerin (NITROSTAT) 0.4 mg SL tablet Uses PRN- OK to take day of surgery    Omega-3 Fatty Acids (fish oil) 1,000 mg Stop taking 7 days prior to surgery. pentoxifylline (TRENtal) 400 mg ER tablet Take day of surgery. ranolazine (RANEXA) 500 mg 12 hr tablet Take day of surgery. sitaGLIPtin (JANUVIA) 100 mg tablet Hold day of surgery. torsemide (DEMADEX) 20 mg tablet Hold day of surgery. Medication instructions for day surgery reviewed. Please use only a sip of water to take your instructed medications. Avoid all over the counter vitamins, supplements and NSAIDS for one week prior to surgery per anesthesia guidelines. Tylenol is ok to take as needed. You will receive a call one business day prior to surgery with an arrival time and hospital directions. If your surgery is scheduled on a Monday, the hospital will be calling you on the Friday prior to your surgery.  If you have not heard from anyone by 8pm, please call the hospital supervisor through the hospital  at [phone]. Apolinar Reyes [phone]). Do not eat or drink anything after midnight the night before your surgery, including candy, mints, lifesavers, or chewing gum. Do not drink alcohol 24hrs before your surgery. Try not to smoke at least 24hrs before your surgery. Follow the pre surgery showering instructions as listed in the [facility] Surgical Experience Booklet” or otherwise provided by your surgeon's office. Do not use a blade to shave the surgical area 1 week before surgery. It is okay to use a clean electric clippers up to 24 hours before surgery. Do not apply any lotions, creams, including makeup, cologne, deodorant, or perfumes after showering on the day of your surgery. Do not use dry shampoo, hair spray, hair gel, or any type of hair products. No contact lenses, eye make-up, or artificial eyelashes. Remove nail polish, including gel polish, and any artificial, gel, or acrylic nails if possible. Remove all jewelry including rings and body piercing jewelry. Wear causal clothing that is easy to take on and off. Consider your type of surgery. Keep any valuables, jewelry, piercings at home. Please bring any specially ordered equipment (sling, braces) if indicated. Arrange for a responsible person to drive you to and from the hospital on the day of your surgery. Visitor Guidelines discussed. Call the surgeon's office with any new illnesses, exposures, or additional questions prior to surgery. Please reference your [facility] Surgical Experience Booklet” for additional information to prepare for your upcoming surgery. See Geriatric Assessment below. .. Cognitive Assessment: 3  CAM:   TUG <15 sec: Falls (last 6 months): 0  Hand  score:  -Attempt 1:  -Attempt 2:  -Attempt 3:  Dev Total Score: 20 Has wound on bottom of foot. PHQ- 9 Depression Scale: 2 Difficulty sleeping several night s week and then he is tired.   Nutrition Assessment Score: 12  METS: 4.73  Health goals:  -What are your overall health goals? (quit smoking, wt. loss, rest, decrease stress) "Be able to feel well enough to be active."    -What brings you strength? (family, friends, Yarsanism, health) "My daughters. "    -What activities are important to you? (exercise, reading, travel, work) "I like to mark around and fix things.  I love working with my hands."

## 2023-11-08 NOTE — TELEPHONE ENCOUNTER
Left message for patient to call us back to let him know that he was cleared for his upcoming surgery 11-16-23 LLF

## 2023-11-16 ENCOUNTER — ANESTHESIA (OUTPATIENT)
Dept: PERIOP | Facility: HOSPITAL | Age: 78
DRG: 253 | End: 2023-11-16
Payer: COMMERCIAL

## 2023-11-16 ENCOUNTER — HOSPITAL ENCOUNTER (INPATIENT)
Facility: HOSPITAL | Age: 78
LOS: 5 days | Discharge: NURSING, REHAB OR LONG-TERM CARE | DRG: 253 | End: 2023-11-21
Attending: SURGERY | Admitting: SURGERY
Payer: COMMERCIAL

## 2023-11-16 DIAGNOSIS — L97.411 DIABETIC ULCER OF RIGHT MIDFOOT ASSOCIATED WITH TYPE 2 DIABETES MELLITUS, LIMITED TO BREAKDOWN OF SKIN (HCC): ICD-10-CM

## 2023-11-16 DIAGNOSIS — K21.9 GASTROESOPHAGEAL REFLUX DISEASE, UNSPECIFIED WHETHER ESOPHAGITIS PRESENT: ICD-10-CM

## 2023-11-16 DIAGNOSIS — Z01.89 ENCOUNTER FOR GERIATRIC ASSESSMENT: ICD-10-CM

## 2023-11-16 DIAGNOSIS — N18.32 STAGE 3B CHRONIC KIDNEY DISEASE (HCC): Primary | ICD-10-CM

## 2023-11-16 DIAGNOSIS — F41.9 ANXIETY: ICD-10-CM

## 2023-11-16 DIAGNOSIS — I73.9 PERIPHERAL ARTERY DISEASE (HCC): ICD-10-CM

## 2023-11-16 DIAGNOSIS — E08.621 DIABETIC ULCER OF RIGHT MIDFOOT ASSOCIATED WITH DIABETES MELLITUS DUE TO UNDERLYING CONDITION, LIMITED TO BREAKDOWN OF SKIN (HCC): ICD-10-CM

## 2023-11-16 DIAGNOSIS — E11.621 DIABETIC ULCER OF RIGHT MIDFOOT ASSOCIATED WITH TYPE 2 DIABETES MELLITUS, LIMITED TO BREAKDOWN OF SKIN (HCC): ICD-10-CM

## 2023-11-16 DIAGNOSIS — L97.411 DIABETIC ULCER OF RIGHT MIDFOOT ASSOCIATED WITH DIABETES MELLITUS DUE TO UNDERLYING CONDITION, LIMITED TO BREAKDOWN OF SKIN (HCC): ICD-10-CM

## 2023-11-16 DIAGNOSIS — I25.10 CORONARY ARTERY DISEASE INVOLVING NATIVE CORONARY ARTERY OF NATIVE HEART WITHOUT ANGINA PECTORIS: ICD-10-CM

## 2023-11-16 DIAGNOSIS — E11.42 TYPE 2 DIABETES MELLITUS WITH DIABETIC POLYNEUROPATHY, WITH LONG-TERM CURRENT USE OF INSULIN (HCC): ICD-10-CM

## 2023-11-16 DIAGNOSIS — I73.9 PAD (PERIPHERAL ARTERY DISEASE) (HCC): ICD-10-CM

## 2023-11-16 DIAGNOSIS — Z79.4 TYPE 2 DIABETES MELLITUS WITH DIABETIC PERIPHERAL ANGIOPATHY WITHOUT GANGRENE, WITH LONG-TERM CURRENT USE OF INSULIN (HCC): ICD-10-CM

## 2023-11-16 DIAGNOSIS — Z79.4 TYPE 2 DIABETES MELLITUS WITH DIABETIC POLYNEUROPATHY, WITH LONG-TERM CURRENT USE OF INSULIN (HCC): ICD-10-CM

## 2023-11-16 DIAGNOSIS — E11.51 TYPE 2 DIABETES MELLITUS WITH DIABETIC PERIPHERAL ANGIOPATHY WITHOUT GANGRENE, WITH LONG-TERM CURRENT USE OF INSULIN (HCC): ICD-10-CM

## 2023-11-16 DIAGNOSIS — I50.32 CHRONIC HEART FAILURE WITH PRESERVED EJECTION FRACTION (HFPEF) (HCC): ICD-10-CM

## 2023-11-16 LAB
ABO GROUP BLD BPU: NORMAL
ABO GROUP BLD BPU: NORMAL
ABO GROUP BLD: NORMAL
ABO GROUP BLD: NORMAL
ANION GAP SERPL CALCULATED.3IONS-SCNC: 3 MMOL/L
APTT PPP: 29 SECONDS (ref 23–37)
BASE EXCESS BLDA CALC-SCNC: -4 MMOL/L (ref -2–3)
BASOPHILS # BLD AUTO: 0.03 THOUSANDS/ÂΜL (ref 0–0.1)
BASOPHILS NFR BLD AUTO: 0 % (ref 0–1)
BLD GP AB SCN SERPL QL: NEGATIVE
BPU ID: NORMAL
BPU ID: NORMAL
BUN SERPL-MCNC: 30 MG/DL (ref 5–25)
CA-I BLD-SCNC: 1.06 MMOL/L (ref 1.12–1.32)
CALCIUM SERPL-MCNC: 7.3 MG/DL (ref 8.4–10.2)
CHLORIDE SERPL-SCNC: 114 MMOL/L (ref 96–108)
CO2 SERPL-SCNC: 23 MMOL/L (ref 21–32)
CREAT SERPL-MCNC: 1.57 MG/DL (ref 0.6–1.3)
CROSSMATCH: NORMAL
CROSSMATCH: NORMAL
EOSINOPHIL # BLD AUTO: 0.13 THOUSAND/ÂΜL (ref 0–0.61)
EOSINOPHIL NFR BLD AUTO: 1 % (ref 0–6)
ERYTHROCYTE [DISTWIDTH] IN BLOOD BY AUTOMATED COUNT: 13.3 % (ref 11.6–15.1)
GFR SERPL CREATININE-BSD FRML MDRD: 41 ML/MIN/1.73SQ M
GLUCOSE SERPL-MCNC: 150 MG/DL (ref 65–140)
GLUCOSE SERPL-MCNC: 158 MG/DL (ref 65–140)
GLUCOSE SERPL-MCNC: 159 MG/DL (ref 65–140)
GLUCOSE SERPL-MCNC: 162 MG/DL (ref 65–140)
GLUCOSE SERPL-MCNC: 162 MG/DL (ref 65–140)
HCO3 BLDA-SCNC: 19 MMOL/L (ref 22–28)
HCT VFR BLD AUTO: 28.4 % (ref 36.5–49.3)
HCT VFR BLD CALC: 30 % (ref 36.5–49.3)
HGB BLD-MCNC: 9.1 G/DL (ref 12–17)
HGB BLDA-MCNC: 10.2 G/DL (ref 12–17)
IMM GRANULOCYTES # BLD AUTO: 0.12 THOUSAND/UL (ref 0–0.2)
IMM GRANULOCYTES NFR BLD AUTO: 1 % (ref 0–2)
INR PPP: 1.15 (ref 0.84–1.19)
KCT BLD-ACNC: 132 SEC (ref 89–137)
KCT BLD-ACNC: 214 SEC (ref 89–137)
KCT BLD-ACNC: 220 SEC (ref 89–137)
KCT BLD-ACNC: 259 SEC (ref 89–137)
LACTATE SERPL-SCNC: 1 MMOL/L (ref 0.5–2)
LYMPHOCYTES # BLD AUTO: 1.86 THOUSANDS/ÂΜL (ref 0.6–4.47)
LYMPHOCYTES NFR BLD AUTO: 14 % (ref 14–44)
MAGNESIUM SERPL-MCNC: 1.4 MG/DL (ref 1.9–2.7)
MCH RBC QN AUTO: 28.2 PG (ref 26.8–34.3)
MCHC RBC AUTO-ENTMCNC: 32 G/DL (ref 31.4–37.4)
MCV RBC AUTO: 88 FL (ref 82–98)
MONOCYTES # BLD AUTO: 0.65 THOUSAND/ÂΜL (ref 0.17–1.22)
MONOCYTES NFR BLD AUTO: 5 % (ref 4–12)
NEUTROPHILS # BLD AUTO: 10.77 THOUSANDS/ÂΜL (ref 1.85–7.62)
NEUTS SEG NFR BLD AUTO: 79 % (ref 43–75)
NRBC BLD AUTO-RTO: 0 /100 WBCS
PCO2 BLD: 20 MMOL/L (ref 21–32)
PCO2 BLD: 27.1 MM HG (ref 36–44)
PH BLD: 7.45 [PH] (ref 7.35–7.45)
PHOSPHATE SERPL-MCNC: 2.6 MG/DL (ref 2.3–4.1)
PLATELET # BLD AUTO: 190 THOUSANDS/UL (ref 149–390)
PMV BLD AUTO: 10.5 FL (ref 8.9–12.7)
PO2 BLD: 207 MM HG (ref 75–129)
POTASSIUM BLD-SCNC: 4 MMOL/L (ref 3.5–5.3)
POTASSIUM SERPL-SCNC: 4.4 MMOL/L (ref 3.5–5.3)
PROTHROMBIN TIME: 14.9 SECONDS (ref 11.6–14.5)
RBC # BLD AUTO: 3.23 MILLION/UL (ref 3.88–5.62)
RH BLD: POSITIVE
RH BLD: POSITIVE
SAO2 % BLD FROM PO2: 100 % (ref 60–85)
SODIUM BLD-SCNC: 144 MMOL/L (ref 136–145)
SODIUM SERPL-SCNC: 140 MMOL/L (ref 135–147)
SPECIMEN EXPIRATION DATE: NORMAL
SPECIMEN SOURCE: ABNORMAL
SPECIMEN SOURCE: NORMAL
UNIT DISPENSE STATUS: NORMAL
UNIT DISPENSE STATUS: NORMAL
UNIT PRODUCT CODE: NORMAL
UNIT PRODUCT CODE: NORMAL
UNIT PRODUCT VOLUME: 350 ML
UNIT PRODUCT VOLUME: 350 ML
UNIT RH: NORMAL
UNIT RH: NORMAL
WBC # BLD AUTO: 13.56 THOUSAND/UL (ref 4.31–10.16)

## 2023-11-16 PROCEDURE — 85025 COMPLETE CBC W/AUTO DIFF WBC: CPT | Performed by: SURGERY

## 2023-11-16 PROCEDURE — 86920 COMPATIBILITY TEST SPIN: CPT

## 2023-11-16 PROCEDURE — 041K0KM BYPASS RIGHT FEMORAL ARTERY TO PERONEAL ARTERY WITH NONAUTOLOGOUS TISSUE SUBSTITUTE, OPEN APPROACH: ICD-10-PCS | Performed by: SURGERY

## 2023-11-16 PROCEDURE — 85014 HEMATOCRIT: CPT

## 2023-11-16 PROCEDURE — 82803 BLOOD GASES ANY COMBINATION: CPT

## 2023-11-16 PROCEDURE — 99222 1ST HOSP IP/OBS MODERATE 55: CPT | Performed by: UROLOGY

## 2023-11-16 PROCEDURE — 84100 ASSAY OF PHOSPHORUS: CPT | Performed by: SURGERY

## 2023-11-16 PROCEDURE — 84295 ASSAY OF SERUM SODIUM: CPT

## 2023-11-16 PROCEDURE — NC001 PR NO CHARGE: Performed by: INTERNAL MEDICINE

## 2023-11-16 PROCEDURE — 86901 BLOOD TYPING SEROLOGIC RH(D): CPT | Performed by: ANESTHESIOLOGY

## 2023-11-16 PROCEDURE — 51702 INSERT TEMP BLADDER CATH: CPT | Performed by: UROLOGY

## 2023-11-16 PROCEDURE — 82947 ASSAY GLUCOSE BLOOD QUANT: CPT

## 2023-11-16 PROCEDURE — 86850 RBC ANTIBODY SCREEN: CPT | Performed by: ANESTHESIOLOGY

## 2023-11-16 PROCEDURE — 80048 BASIC METABOLIC PNL TOTAL CA: CPT | Performed by: SURGERY

## 2023-11-16 PROCEDURE — 85730 THROMBOPLASTIN TIME PARTIAL: CPT | Performed by: ANESTHESIOLOGY

## 2023-11-16 PROCEDURE — 85610 PROTHROMBIN TIME: CPT | Performed by: ANESTHESIOLOGY

## 2023-11-16 PROCEDURE — 84132 ASSAY OF SERUM POTASSIUM: CPT

## 2023-11-16 PROCEDURE — 85347 COAGULATION TIME ACTIVATED: CPT

## 2023-11-16 PROCEDURE — 35666 BPG FEM-ANT TIB PST TIB/PRNL: CPT | Performed by: SURGERY

## 2023-11-16 PROCEDURE — 82948 REAGENT STRIP/BLOOD GLUCOSE: CPT

## 2023-11-16 PROCEDURE — 0T9B70Z DRAINAGE OF BLADDER WITH DRAINAGE DEVICE, VIA NATURAL OR ARTIFICIAL OPENING: ICD-10-PCS | Performed by: UROLOGY

## 2023-11-16 PROCEDURE — 86900 BLOOD TYPING SEROLOGIC ABO: CPT | Performed by: ANESTHESIOLOGY

## 2023-11-16 PROCEDURE — 83605 ASSAY OF LACTIC ACID: CPT | Performed by: SURGERY

## 2023-11-16 PROCEDURE — 82330 ASSAY OF CALCIUM: CPT

## 2023-11-16 PROCEDURE — 83735 ASSAY OF MAGNESIUM: CPT | Performed by: SURGERY

## 2023-11-16 DEVICE — TISSUE CRYOPRESERVED 80+CM VEIN SAPHENOUS ALLOGRAFT: Type: IMPLANTABLE DEVICE | Site: LEG | Status: FUNCTIONAL

## 2023-11-16 RX ORDER — SODIUM CHLORIDE 9 MG/ML
INJECTION, SOLUTION INTRAVENOUS CONTINUOUS PRN
Status: DISCONTINUED | OUTPATIENT
Start: 2023-11-16 | End: 2023-11-16

## 2023-11-16 RX ORDER — CHLORHEXIDINE GLUCONATE ORAL RINSE 1.2 MG/ML
15 SOLUTION DENTAL EVERY 12 HOURS SCHEDULED
Status: DISCONTINUED | OUTPATIENT
Start: 2023-11-16 | End: 2023-11-16

## 2023-11-16 RX ORDER — RANOLAZINE 500 MG/1
500 TABLET, EXTENDED RELEASE ORAL 2 TIMES DAILY
Status: DISCONTINUED | OUTPATIENT
Start: 2023-11-16 | End: 2023-11-21 | Stop reason: HOSPADM

## 2023-11-16 RX ORDER — INSULIN LISPRO 100 [IU]/ML
1-6 INJECTION, SOLUTION INTRAVENOUS; SUBCUTANEOUS
Status: DISCONTINUED | OUTPATIENT
Start: 2023-11-16 | End: 2023-11-21 | Stop reason: HOSPADM

## 2023-11-16 RX ORDER — CEFAZOLIN SODIUM 1 G/3ML
INJECTION, POWDER, FOR SOLUTION INTRAMUSCULAR; INTRAVENOUS AS NEEDED
Status: DISCONTINUED | OUTPATIENT
Start: 2023-11-16 | End: 2023-11-16

## 2023-11-16 RX ORDER — CHLORHEXIDINE GLUCONATE ORAL RINSE 1.2 MG/ML
15 SOLUTION DENTAL EVERY 12 HOURS SCHEDULED
Status: DISCONTINUED | OUTPATIENT
Start: 2023-11-16 | End: 2023-11-20

## 2023-11-16 RX ORDER — LIDOCAINE HYDROCHLORIDE 20 MG/ML
1 JELLY TOPICAL ONCE
Status: COMPLETED | OUTPATIENT
Start: 2023-11-16 | End: 2023-11-16

## 2023-11-16 RX ORDER — CLONIDINE HYDROCHLORIDE 0.1 MG/1
0.1 TABLET ORAL EVERY 12 HOURS
Status: DISCONTINUED | OUTPATIENT
Start: 2023-11-17 | End: 2023-11-21 | Stop reason: HOSPADM

## 2023-11-16 RX ORDER — EPHEDRINE SULFATE 50 MG/ML
5 INJECTION INTRAVENOUS ONCE
Status: COMPLETED | OUTPATIENT
Start: 2023-11-16 | End: 2023-11-16

## 2023-11-16 RX ORDER — PROTAMINE SULFATE 10 MG/ML
INJECTION, SOLUTION INTRAVENOUS AS NEEDED
Status: DISCONTINUED | OUTPATIENT
Start: 2023-11-16 | End: 2023-11-16

## 2023-11-16 RX ORDER — HEPARIN SODIUM 5000 [USP'U]/ML
5000 INJECTION, SOLUTION INTRAVENOUS; SUBCUTANEOUS EVERY 8 HOURS SCHEDULED
Status: DISCONTINUED | OUTPATIENT
Start: 2023-11-16 | End: 2023-11-21 | Stop reason: HOSPADM

## 2023-11-16 RX ORDER — HYDROMORPHONE HCL/PF 1 MG/ML
0.5 SYRINGE (ML) INJECTION
Status: DISCONTINUED | OUTPATIENT
Start: 2023-11-16 | End: 2023-11-17

## 2023-11-16 RX ORDER — ASPIRIN 81 MG/1
81 TABLET, CHEWABLE ORAL DAILY
Status: DISCONTINUED | OUTPATIENT
Start: 2023-11-17 | End: 2023-11-21 | Stop reason: HOSPADM

## 2023-11-16 RX ORDER — INSULIN GLARGINE 100 [IU]/ML
15 INJECTION, SOLUTION SUBCUTANEOUS
Status: DISCONTINUED | OUTPATIENT
Start: 2023-11-16 | End: 2023-11-21 | Stop reason: HOSPADM

## 2023-11-16 RX ORDER — SODIUM CHLORIDE 9 MG/ML
125 INJECTION, SOLUTION INTRAVENOUS CONTINUOUS
Status: DISCONTINUED | OUTPATIENT
Start: 2023-11-16 | End: 2023-11-16

## 2023-11-16 RX ORDER — AMLODIPINE BESYLATE 5 MG/1
5 TABLET ORAL DAILY
Status: DISCONTINUED | OUTPATIENT
Start: 2023-11-17 | End: 2023-11-21 | Stop reason: HOSPADM

## 2023-11-16 RX ORDER — OXYCODONE HYDROCHLORIDE 10 MG/1
10 TABLET ORAL EVERY 6 HOURS PRN
Status: DISCONTINUED | OUTPATIENT
Start: 2023-11-16 | End: 2023-11-17

## 2023-11-16 RX ORDER — FENOFIBRATE 145 MG/1
145 TABLET, COATED ORAL DAILY
Status: DISCONTINUED | OUTPATIENT
Start: 2023-11-17 | End: 2023-11-21 | Stop reason: HOSPADM

## 2023-11-16 RX ORDER — PROPOFOL 10 MG/ML
INJECTION, EMULSION INTRAVENOUS AS NEEDED
Status: DISCONTINUED | OUTPATIENT
Start: 2023-11-16 | End: 2023-11-16

## 2023-11-16 RX ORDER — MAGNESIUM HYDROXIDE 1200 MG/15ML
LIQUID ORAL AS NEEDED
Status: DISCONTINUED | OUTPATIENT
Start: 2023-11-16 | End: 2023-11-16 | Stop reason: HOSPADM

## 2023-11-16 RX ORDER — PENTOXIFYLLINE 400 MG/1
400 TABLET, EXTENDED RELEASE ORAL
Status: DISCONTINUED | OUTPATIENT
Start: 2023-11-16 | End: 2023-11-17

## 2023-11-16 RX ORDER — ALBUMIN, HUMAN INJ 5% 5 %
SOLUTION INTRAVENOUS CONTINUOUS PRN
Status: DISCONTINUED | OUTPATIENT
Start: 2023-11-16 | End: 2023-11-16

## 2023-11-16 RX ORDER — CHLORHEXIDINE GLUCONATE ORAL RINSE 1.2 MG/ML
15 SOLUTION DENTAL EVERY 12 HOURS SCHEDULED
Status: DISCONTINUED | OUTPATIENT
Start: 2023-11-16 | End: 2023-11-16 | Stop reason: HOSPADM

## 2023-11-16 RX ORDER — OXYCODONE HYDROCHLORIDE 5 MG/1
5 TABLET ORAL EVERY 6 HOURS PRN
Status: DISCONTINUED | OUTPATIENT
Start: 2023-11-16 | End: 2023-11-17

## 2023-11-16 RX ORDER — INSULIN LISPRO 100 [IU]/ML
1-6 INJECTION, SOLUTION INTRAVENOUS; SUBCUTANEOUS
Status: DISCONTINUED | OUTPATIENT
Start: 2023-11-16 | End: 2023-11-16

## 2023-11-16 RX ORDER — ATORVASTATIN CALCIUM 40 MG/1
40 TABLET, FILM COATED ORAL EVERY EVENING
Status: DISCONTINUED | OUTPATIENT
Start: 2023-11-16 | End: 2023-11-21 | Stop reason: HOSPADM

## 2023-11-16 RX ORDER — EPHEDRINE SULFATE 50 MG/ML
INJECTION INTRAVENOUS AS NEEDED
Status: DISCONTINUED | OUTPATIENT
Start: 2023-11-16 | End: 2023-11-16

## 2023-11-16 RX ORDER — CLOPIDOGREL BISULFATE 75 MG/1
75 TABLET ORAL DAILY
Status: DISCONTINUED | OUTPATIENT
Start: 2023-11-17 | End: 2023-11-21 | Stop reason: HOSPADM

## 2023-11-16 RX ORDER — ISOSORBIDE MONONITRATE 60 MG/1
60 TABLET, EXTENDED RELEASE ORAL DAILY
Status: DISCONTINUED | OUTPATIENT
Start: 2023-11-17 | End: 2023-11-21 | Stop reason: HOSPADM

## 2023-11-16 RX ORDER — FENTANYL CITRATE 50 UG/ML
INJECTION, SOLUTION INTRAMUSCULAR; INTRAVENOUS AS NEEDED
Status: DISCONTINUED | OUTPATIENT
Start: 2023-11-16 | End: 2023-11-16

## 2023-11-16 RX ORDER — FENTANYL CITRATE/PF 50 MCG/ML
25 SYRINGE (ML) INJECTION
Status: COMPLETED | OUTPATIENT
Start: 2023-11-16 | End: 2023-11-16

## 2023-11-16 RX ORDER — ONDANSETRON 2 MG/ML
INJECTION INTRAMUSCULAR; INTRAVENOUS AS NEEDED
Status: DISCONTINUED | OUTPATIENT
Start: 2023-11-16 | End: 2023-11-16

## 2023-11-16 RX ORDER — SODIUM CHLORIDE, SODIUM GLUCONATE, SODIUM ACETATE, POTASSIUM CHLORIDE, MAGNESIUM CHLORIDE, SODIUM PHOSPHATE, DIBASIC, AND POTASSIUM PHOSPHATE .53; .5; .37; .037; .03; .012; .00082 G/100ML; G/100ML; G/100ML; G/100ML; G/100ML; G/100ML; G/100ML
125 INJECTION, SOLUTION INTRAVENOUS CONTINUOUS
Status: DISCONTINUED | OUTPATIENT
Start: 2023-11-16 | End: 2023-11-17

## 2023-11-16 RX ORDER — METOPROLOL TARTRATE 50 MG/1
50 TABLET, FILM COATED ORAL EVERY 12 HOURS SCHEDULED
Status: DISCONTINUED | OUTPATIENT
Start: 2023-11-16 | End: 2023-11-21 | Stop reason: HOSPADM

## 2023-11-16 RX ORDER — HEPARIN SODIUM 1000 [USP'U]/ML
INJECTION, SOLUTION INTRAVENOUS; SUBCUTANEOUS AS NEEDED
Status: DISCONTINUED | OUTPATIENT
Start: 2023-11-16 | End: 2023-11-16

## 2023-11-16 RX ORDER — LIDOCAINE HYDROCHLORIDE 10 MG/ML
INJECTION, SOLUTION EPIDURAL; INFILTRATION; INTRACAUDAL; PERINEURAL AS NEEDED
Status: DISCONTINUED | OUTPATIENT
Start: 2023-11-16 | End: 2023-11-16

## 2023-11-16 RX ORDER — GLYCOPYRROLATE 0.2 MG/ML
INJECTION INTRAMUSCULAR; INTRAVENOUS AS NEEDED
Status: DISCONTINUED | OUTPATIENT
Start: 2023-11-16 | End: 2023-11-16

## 2023-11-16 RX ORDER — ROCURONIUM BROMIDE 10 MG/ML
INJECTION, SOLUTION INTRAVENOUS AS NEEDED
Status: DISCONTINUED | OUTPATIENT
Start: 2023-11-16 | End: 2023-11-16

## 2023-11-16 RX ORDER — GABAPENTIN 300 MG/1
600 CAPSULE ORAL 2 TIMES DAILY
Status: DISCONTINUED | OUTPATIENT
Start: 2023-11-16 | End: 2023-11-21 | Stop reason: HOSPADM

## 2023-11-16 RX ORDER — ONDANSETRON 2 MG/ML
4 INJECTION INTRAMUSCULAR; INTRAVENOUS EVERY 4 HOURS PRN
Status: DISCONTINUED | OUTPATIENT
Start: 2023-11-16 | End: 2023-11-21 | Stop reason: HOSPADM

## 2023-11-16 RX ORDER — ONDANSETRON 2 MG/ML
4 INJECTION INTRAMUSCULAR; INTRAVENOUS ONCE AS NEEDED
Status: DISCONTINUED | OUTPATIENT
Start: 2023-11-16 | End: 2023-11-16 | Stop reason: HOSPADM

## 2023-11-16 RX ADMIN — ALBUMIN (HUMAN): 12.5 INJECTION, SOLUTION INTRAVENOUS at 14:25

## 2023-11-16 RX ADMIN — CEFAZOLIN 2000 MG: 1 INJECTION, POWDER, FOR SOLUTION INTRAMUSCULAR; INTRAVENOUS; PARENTERAL at 09:15

## 2023-11-16 RX ADMIN — HEPARIN SODIUM 5000 UNITS: 5000 INJECTION INTRAVENOUS; SUBCUTANEOUS at 21:58

## 2023-11-16 RX ADMIN — PROPOFOL 50 MG: 10 INJECTION, EMULSION INTRAVENOUS at 09:35

## 2023-11-16 RX ADMIN — PROTAMINE SULFATE 10 MG: 10 INJECTION, SOLUTION INTRAVENOUS at 13:38

## 2023-11-16 RX ADMIN — PENTOXIFYLLINE 400 MG: 400 TABLET, EXTENDED RELEASE ORAL at 18:04

## 2023-11-16 RX ADMIN — SODIUM CHLORIDE 125 ML/HR: 0.9 INJECTION, SOLUTION INTRAVENOUS at 07:31

## 2023-11-16 RX ADMIN — ATORVASTATIN CALCIUM 40 MG: 40 TABLET, FILM COATED ORAL at 17:35

## 2023-11-16 RX ADMIN — CEFAZOLIN 2000 MG: 1 INJECTION, POWDER, FOR SOLUTION INTRAMUSCULAR; INTRAVENOUS; PARENTERAL at 13:03

## 2023-11-16 RX ADMIN — NOREPINEPHRINE BITARTRATE 32 MCG: 1 INJECTION, SOLUTION, CONCENTRATE INTRAVENOUS at 13:49

## 2023-11-16 RX ADMIN — INSULIN LISPRO 1 UNITS: 100 INJECTION, SOLUTION INTRAVENOUS; SUBCUTANEOUS at 18:03

## 2023-11-16 RX ADMIN — SODIUM CHLORIDE: 0.9 INJECTION, SOLUTION INTRAVENOUS at 08:47

## 2023-11-16 RX ADMIN — PROTAMINE SULFATE 10 MG: 10 INJECTION, SOLUTION INTRAVENOUS at 13:40

## 2023-11-16 RX ADMIN — INSULIN GLARGINE 15 UNITS: 100 INJECTION, SOLUTION SUBCUTANEOUS at 22:14

## 2023-11-16 RX ADMIN — HEPARIN SODIUM 1000 UNITS: 1000 INJECTION INTRAVENOUS; SUBCUTANEOUS at 11:24

## 2023-11-16 RX ADMIN — LIDOCAINE HYDROCHLORIDE 100 MG: 10 INJECTION, SOLUTION EPIDURAL; INFILTRATION; INTRACAUDAL; PERINEURAL at 08:47

## 2023-11-16 RX ADMIN — FENTANYL CITRATE 25 MCG: 50 INJECTION INTRAMUSCULAR; INTRAVENOUS at 15:22

## 2023-11-16 RX ADMIN — ROCURONIUM BROMIDE 50 MG: 10 INJECTION, SOLUTION INTRAVENOUS at 08:48

## 2023-11-16 RX ADMIN — CHLORHEXIDINE GLUCONATE 15 ML: 1.2 RINSE ORAL at 07:30

## 2023-11-16 RX ADMIN — SODIUM CHLORIDE, SODIUM LACTATE, POTASSIUM CHLORIDE, AND CALCIUM CHLORIDE 500 ML: .6; .31; .03; .02 INJECTION, SOLUTION INTRAVENOUS at 20:30

## 2023-11-16 RX ADMIN — FENTANYL CITRATE 100 MCG: 50 INJECTION INTRAMUSCULAR; INTRAVENOUS at 08:47

## 2023-11-16 RX ADMIN — GABAPENTIN 600 MG: 300 CAPSULE ORAL at 17:35

## 2023-11-16 RX ADMIN — RANOLAZINE 500 MG: 500 TABLET, EXTENDED RELEASE ORAL at 20:17

## 2023-11-16 RX ADMIN — SODIUM CHLORIDE, SODIUM GLUCONATE, SODIUM ACETATE, POTASSIUM CHLORIDE, MAGNESIUM CHLORIDE, SODIUM PHOSPHATE, DIBASIC, AND POTASSIUM PHOSPHATE 125 ML/HR: .53; .5; .37; .037; .03; .012; .00082 INJECTION, SOLUTION INTRAVENOUS at 17:20

## 2023-11-16 RX ADMIN — SODIUM CHLORIDE: 9 INJECTION, SOLUTION INTRAVENOUS at 14:07

## 2023-11-16 RX ADMIN — SUGAMMADEX 200 MG: 100 INJECTION, SOLUTION INTRAVENOUS at 14:38

## 2023-11-16 RX ADMIN — HEPARIN SODIUM 3000 UNITS: 1000 INJECTION INTRAVENOUS; SUBCUTANEOUS at 11:32

## 2023-11-16 RX ADMIN — GLYCOPYRROLATE 0.2 MCG: 0.2 INJECTION INTRAMUSCULAR; INTRAVENOUS at 08:57

## 2023-11-16 RX ADMIN — PROPOFOL 150 MG: 10 INJECTION, EMULSION INTRAVENOUS at 08:47

## 2023-11-16 RX ADMIN — PHENYLEPHRINE HYDROCHLORIDE 20 MCG/MIN: 10 INJECTION INTRAVENOUS at 08:57

## 2023-11-16 RX ADMIN — OXYCODONE HYDROCHLORIDE 10 MG: 10 TABLET ORAL at 20:33

## 2023-11-16 RX ADMIN — PROTAMINE SULFATE 10 MG: 10 INJECTION, SOLUTION INTRAVENOUS at 13:43

## 2023-11-16 RX ADMIN — FENTANYL CITRATE 25 MCG: 50 INJECTION INTRAMUSCULAR; INTRAVENOUS at 15:30

## 2023-11-16 RX ADMIN — SODIUM CHLORIDE: 0.9 INJECTION, SOLUTION INTRAVENOUS at 13:20

## 2023-11-16 RX ADMIN — ONDANSETRON 4 MG: 2 INJECTION INTRAMUSCULAR; INTRAVENOUS at 14:38

## 2023-11-16 RX ADMIN — HEPARIN SODIUM 4000 UNITS: 1000 INJECTION INTRAVENOUS; SUBCUTANEOUS at 12:22

## 2023-11-16 RX ADMIN — LIDOCAINE HYDROCHLORIDE 1 APPLICATION: 20 JELLY TOPICAL at 16:17

## 2023-11-16 RX ADMIN — SODIUM CHLORIDE: 9 INJECTION, SOLUTION INTRAVENOUS at 13:12

## 2023-11-16 RX ADMIN — ALBUMIN (HUMAN): 12.5 INJECTION, SOLUTION INTRAVENOUS at 14:17

## 2023-11-16 RX ADMIN — SODIUM CHLORIDE, SODIUM LACTATE, POTASSIUM CHLORIDE, AND CALCIUM CHLORIDE 500 ML: .6; .31; .03; .02 INJECTION, SOLUTION INTRAVENOUS at 22:07

## 2023-11-16 RX ADMIN — FENTANYL CITRATE 25 MCG: 50 INJECTION INTRAMUSCULAR; INTRAVENOUS at 16:27

## 2023-11-16 RX ADMIN — PROTAMINE SULFATE 10 MG: 10 INJECTION, SOLUTION INTRAVENOUS at 13:39

## 2023-11-16 RX ADMIN — EPHEDRINE SULFATE 5 MG: 50 INJECTION, SOLUTION INTRAVENOUS at 16:15

## 2023-11-16 RX ADMIN — HEPARIN SODIUM 10000 UNITS: 1000 INJECTION INTRAVENOUS; SUBCUTANEOUS at 10:59

## 2023-11-16 RX ADMIN — HYDROMORPHONE HYDROCHLORIDE 0.5 MG: 1 INJECTION, SOLUTION INTRAMUSCULAR; INTRAVENOUS; SUBCUTANEOUS at 21:56

## 2023-11-16 RX ADMIN — PROTAMINE SULFATE 10 MG: 10 INJECTION, SOLUTION INTRAVENOUS at 13:42

## 2023-11-16 RX ADMIN — EPHEDRINE SULFATE 10 MG: 50 INJECTION, SOLUTION INTRAVENOUS at 08:55

## 2023-11-16 RX ADMIN — HEPARIN SODIUM 3000 UNITS: 1000 INJECTION INTRAVENOUS; SUBCUTANEOUS at 11:10

## 2023-11-16 RX ADMIN — HEPARIN SODIUM 2000 UNITS: 1000 INJECTION INTRAVENOUS; SUBCUTANEOUS at 12:59

## 2023-11-16 RX ADMIN — FENTANYL CITRATE 25 MCG: 50 INJECTION INTRAMUSCULAR; INTRAVENOUS at 16:08

## 2023-11-16 RX ADMIN — SODIUM CHLORIDE: 0.9 INJECTION, SOLUTION INTRAVENOUS at 14:32

## 2023-11-16 RX ADMIN — INSULIN LISPRO 1 UNITS: 100 INJECTION, SOLUTION INTRAVENOUS; SUBCUTANEOUS at 22:15

## 2023-11-16 RX ADMIN — SODIUM CHLORIDE: 9 INJECTION, SOLUTION INTRAVENOUS at 08:57

## 2023-11-16 NOTE — CONSULTS
UROLOGY CONSULTATION NOTE     Patient Identifiers: Martha Posada (MRN [id number])  Service Requesting Consultation: Vascular surgery  Service Providing Consultation:  Urology, Stefan You MD    Date of Service: 11/16/2023  Inpatient consult to Urology  Consult performed by: Stefan You MD  Consult ordered by: David Sharp MD          Reason for Consultation: post op retention. Prostate ca, s/p AUS placement    History of Present Illness:     Martha Posada is a 78 y.o. old who underwent vascular surgery today. Postoperatively the patient notes he feels he has to void. He has been unable to urinate and bladder scan reveals greater than 300 cc in his bladder. The patient has a history of prostate cancer. He underwent robot-assisted radical prostatectomy in 2015 and subsequently underwent artificial urinary sphincter placement in 2017. He reports the sphincter has been working well. He does wear 1 pad per day. He has not had evidence of biochemical recurrence of his prostate cancer. He denies gross hematuria. Consult has been requested for postoperative urinary retention. ASSESSMENT & PLAN:     #1 postoperative urinary retention-likely related to anesthesia and postoperative state. #2 stress incontinence status post artificial urinary sphincter 2017  #3 prostate cancer status post robot-assisted radical prostatectomy 2015-PSA less than 0.0 1 October 2023  Plan: The patient artificial sphincter was deactivated. A 14 Kazakh Dyer catheter was easily placed using sterile technique and 2% Xylocaine jelly. The catheter drained more than 400 cc of urine. 10 cc was left in the balloon. Would maintain Dyer catheter to gravity overnight. Would plan for voiding trial tomorrow as long as patient is able to stand to void. Once he is voiding adequately the sphincter can be reactivated.     Past Medical, Past Surgical History:     Past Medical History:   Diagnosis Date    CAD (coronary artery disease)     Cancer (HCC)     prostate    CHF (congestive heart failure) (HCC)     Chronic kidney disease     Diabetes mellitus ([address])     Hyperlipidemia     Hypertension     Myocardial infarction ([address])     Neuropathy     Bilateral feet    Sleep apnea     Could not tolerate CPAP   :    Past Surgical History:   Procedure Laterality Date    ADENOIDECTOMY      CARDIAC CATHETERIZATION Left 10/19/2022    Procedure: Cardiac Left Heart Cath;  Surgeon: Susan Koenig MD;  Location: [facility];   Service: Cardiology    CARDIAC SURGERY  2002    3 cardiac bypass then angioplasty 7/2020    CHOLECYSTECTOMY      COLONOSCOPY      CORONARY ARTERY BYPASS GRAFT      IR LOWER EXTREMITY ANGIOGRAM  11/1/2023    [place] SLCTV CATHJ 3RD+ ORD SLCTV ABDL PEL/LXTR [facility] Right 11/1/2023    Procedure: ARTERIOGRAM Right lower extremity arteriogram with CO2 via right groin access;  Surgeon: Leydi Oleary MD;  Location: [facility];  Service: Vascular    PROSTATE SURGERY      TONSILLECTOMY     :    Medications, Allergies:     Current Facility-Administered Medications   Medication Dose Route Frequency    [START ON 11/17/2023] amLODIPine (NORVASC) tablet 5 mg  5 mg Oral Daily    [START ON 11/17/2023] aspirin chewable tablet 81 mg  81 mg Oral Daily    atorvastatin (LIPITOR) tablet 40 mg  40 mg Oral QPM    chlorhexidine (PERIDEX) 0.12 % oral rinse 15 mL  15 mL Swish & Spit Q12H [address]    [START ON 11/17/2023] cloNIDine (CATAPRES) tablet 0.1 mg  0.1 mg Oral Q12H    [START ON 11/17/2023] clopidogrel (PLAVIX) tablet 75 mg  75 mg Oral Daily    ePHEDrine injection 5 mg  5 mg Intravenous Once    [START ON 11/17/2023] fenofibrate (TRICOR) tablet 145 mg  145 mg Oral Daily    fentaNYL (SUBLIMAZE) injection 25 mcg  25 mcg Intravenous Q5 Min PRN    gabapentin (NEURONTIN) tablet 600 mg  600 mg Oral BID    heparin (porcine) subcutaneous injection 5,000 Units  5,000 Units Subcutaneous Q8H [address]    insulin lispro (HumaLOG) 100 units/mL subcutaneous injection 1-6 Units  1-6 Units Subcutaneous TID AC    insulin lispro (HumaLOG) 100 units/mL subcutaneous injection 1-6 Units  1-6 Units Subcutaneous HS    [START ON 2023] isosorbide mononitrate (IMDUR) 24 hr tablet 60 mg  60 mg Oral Daily    lactated ringers bolus 500 mL  500 mL Intravenous Once PRN    And    lactated ringers bolus 500 mL  500 mL Intravenous Once PRN    lidocaine (URO-JET) 2 % urethral/mucosal gel 1 Application  1 Application Urethral Once    metoprolol tartrate (LOPRESSOR) tablet 50 mg  50 mg Oral Q12H CONSTANTINE    multi-electrolyte (PLASMALYTE-A/ISOLYTE-S PH 7.4) IV solution  125 mL/hr Intravenous Continuous    ondansetron (ZOFRAN) injection 4 mg  4 mg Intravenous Once PRN    pentoxifylline (TRENtal) ER tablet 400 mg  400 mg Oral TID With Meals    ranolazine (RANEXA) 12 hr tablet 500 mg  500 mg Oral BID    sodium chloride 0.9 % bolus 500 mL  500 mL Intravenous Once PRN    And    sodium chloride 0.9 % bolus 500 mL  500 mL Intravenous Once PRN    sodium chloride 0.9 % infusion  125 mL/hr Intravenous Continuous       Allergies: Allergies   Allergen Reactions    Lisinopril Rash and Lip Swelling   :    Social and Family History:   Social History:   Social History     Tobacco Use    Smoking status: Former     Packs/day: 1.50     Years: 33.00     Total pack years: 49.50     Types: Cigarettes     Start date:      Quit date:      Years since quittin.8    Smokeless tobacco: Never   Vaping Use    Vaping Use: Never used   Substance Use Topics    Alcohol use: Yes     Alcohol/week: 14.0 standard drinks of alcohol     Types: 14 Cans of beer per week     Comment: 1 -2 daily    Drug use: Never   .     Social History     Tobacco Use   Smoking Status Former    Packs/day: 1.50    Years: 33.00    Total pack years: 49.50    Types: Cigarettes    Start date:     Quit date:     Years since quittin.8   Smokeless Tobacco Never       Family History:  Family History   Problem Relation Age of Onset    Diabetes Mother     Alcohol abuse Father     Mental illness Neg Hx    :     Review of Systems:     General: Shelby Fever, chills, or night sweats  Cardiac: Negative for chest pain. Pulmonary: Negative for shortness of breath. Gastrointestinal: Denies Abdominal pain, no  nausea, vomiting, or diarrhea   Genitourinary: See HPI above. Neurologic: No focal signs   Musculo-skeletal: No complaints    All other systems queried were negative. Physical Exam:     General appearance: alert and oriented, in no acute distress  Head: Normocephalic, without obvious abnormality, atraumatic  Neck: supple, symmetrical, trachea midline  Back: symmetric, no curvature. ROM normal. No CVA tenderness. Lungs: clear to auscultation bilaterally  Heart: regular rate and rhythm  Abdomen: abdomen soft, NT- bladder palpable above pubic symphisis  Male genitalia: normal, AUS pump in good position in scrotum  Extremities: ulcer right midfoot  Neurologic: Grossly normal      Labs:     Lab Results   Component Value Date    HGB 9.1 (L) 11/16/2023    HCT 28.4 (L) 11/16/2023    WBC 13.56 (H) 11/16/2023     11/16/2023   ]    Lab Results   Component Value Date    K 4.4 11/16/2023     (H) 11/16/2023    CO2 23 11/16/2023    BUN 30 (H) 11/16/2023    CREATININE 1.57 (H) 11/16/2023    CALCIUM 7.3 (L) 11/16/2023    GLUCOSE 159 (H) 11/16/2023   ]    Imaging:   I personally reviewed the images and report of the following studies, and reviewed them with the patient: labs, psa, CT A/P 6/2023          Thank you for allowing me to participate in this patients’ care. Please do not hesitate to call with any additional questions.   Luli Tovar MD

## 2023-11-16 NOTE — CONSULTS
[facility]  Consult: Critical Care  Name: Samia Greco 78 y.o. male I MRN: [id number]  Unit/Bed#: ICU 03 I Date of Admission: 11/16/2023   Date of Service: 11/16/2023 I Hospital Day: 0    Assessment/Plan    * Diabetic ulcer of right midfoot associated with diabetes mellitus due to underlying condition, limited to breakdown of skin [facility])  Assessment & Plan  Lab Results   Component Value Date    HGBA1C 7.0 (H) 10/04/2023     POA: Right lower extremity CLTI with chronic nonhealing foot ulcer status post right femoral popliteal bypass with pulses detected by Doppler and hemodynamically stable with BP within goal range. Plan:  Close hemodynamic and neurovascular monitoring  Continue IVF with Isolyte at 125 mL/h   As needed IVF bolus ordered by primary team to maintain -160  Tylenol as needed for mild pain  Optimize BG control to facilitate wound healing  Podiatry consulted by primary team - appreciate input      Type 2 diabetes mellitus with diabetic polyneuropathy, with long-term current use of insulin [facility])  Assessment & Plan  Lab Results   Component Value Date    HGBA1C 7.0 (H) 10/04/2023       Recent Labs     11/16/23  0722 11/16/23  1723   POCGLU 162* 158*       Blood Sugar Average: Last 72 hrs:  (P) 160. On oral hypoglycemic meds and long-acting Lantus insulin 15 units nightly as outpatient.     Plan:  Hold PTA oral meds while inpatient  Continue home Lantus 15 units nightly  SSI correction with Accu-Cheks 4 times daily with meals and at bedtime  Adjust insulin regimen as needed based on BG levels, goal 140-180  Level 2 CARB/CHO controlled diabetic diet  Hypoglycemia protocol    Peripheral artery disease (HCC)  Assessment & Plan  Continue home ASA, Plavix, and pentoxifylline    Coronary artery disease involving native coronary artery of native heart without angina pectoris  Assessment & Plan  Continue PTA ASA, Plavix, BB, statin, ranolazine and Imdur    Primary hypertension  Assessment & Plan  Continue home antihypertensive regimen as per primary team    Chronic HFpEF/Moderate pHTN (HFpEF) ([address])  Assessment & Plan  Wt Readings from Last 3 Encounters:   11/16/23 114 kg (251 lb 15.8 oz)   11/01/23 108 kg (239 lb)   10/31/23 108 kg (239 lb)       Intake/Output Summary (Last 24 hours) at 11/16/2023 2100  Last data filed at 11/16/2023 2000  Gross per 24 hour   Intake 6433.33 ml   Output 900 ml   Net 5533.33 ml     No evidence of acute exacerbation. Maintained on torsemide as outpatient. Plan:  Monitor UOP and volume status  Home torsemide on hold as per primary team       Ppx:  Stress ulcer: Not indicated  VTE: Heparin SC    Discussion with Patient/Family: Attempted to update  via phone. Left voicemail. Level of Care: Critical Care      History of Present Illness   CC Chief Concern: Right lower extremity CLTI with tissue loss status post right femoral-popliteal bypass requiring close hemodynamic and neurovascular monitoring. HPI: Beatrice Sue is a 78 y.o. male with a PMH of IDDM2, PAD, former smoker, CAD, CKD, HTN, HLD, chronic HFpEF and pulmonary hypertension who presents with chronic nonhealing right foot wound initially sustained in April 2023 after stepping on a nail, found to have right lower extremity critical limb threatening ischemia with tissue loss on arterial studies obtained as outpatient now s/p right femoral popliteal bypass and transferred to the ICU postoperatively for close hemodynamic and neurovascular monitoring. Patient tolerated procedure without complications and arrives to the ICU hemodynamically stable with BP within goal range as per vascular surgery. Source/Reliability: the patient who is a reliable historian. Review of Systems   Constitutional:  Negative for chills and fever. HENT:  Negative for rhinorrhea and trouble swallowing. Eyes:  Negative for visual disturbance.    Respiratory:  Negative for cough, shortness of breath and wheezing. Cardiovascular:  Negative for chest pain and leg swelling. Gastrointestinal:  Negative for abdominal pain, diarrhea, nausea and vomiting. Genitourinary:  Negative for decreased urine volume, difficulty urinating, dysuria and hematuria. Musculoskeletal:  Negative for arthralgias and myalgias. Skin:  Negative for rash and wound. Neurological:  Negative for dizziness, syncope, weakness, light-headedness and headaches. Psychiatric/Behavioral:  Negative for confusion and dysphoric mood. Historical Information   PMHx:   Past Medical History:   Diagnosis Date    CAD (coronary artery disease)     Cancer ([address] [facility])     prostate    CHF (congestive heart failure) ([address] [facility])     Chronic kidney disease     Diabetes mellitus ([address] [facility])     Hyperlipidemia     Hypertension     Myocardial infarction ([address] [facility])     Neuropathy     Bilateral feet    Sleep apnea     Could not tolerate CPAP     PSHx:   Past Surgical History:   Procedure Laterality Date    ADENOIDECTOMY      CARDIAC CATHETERIZATION Left 10/19/2022    Procedure: Cardiac Left Heart Cath;  Surgeon: Aneudy Mooney MD;  Location: [facility]; Service: Cardiology    CARDIAC SURGERY  2002    3 cardiac bypass then angioplasty 7/2020    CHOLECYSTECTOMY      COLONOSCOPY      CORONARY ARTERY BYPASS GRAFT      IR LOWER EXTREMITY ANGIOGRAM  11/1/2023    [facility] 3RD+ ORD SLCTV ABDL PEL/LXTR [facility] Right 11/1/2023    Procedure: ARTERIOGRAM Right lower extremity arteriogram with CO2 via right groin access;  Surgeon: Corinne Velázquez MD;  Location: [facility] MAIN OR;  Service: Vascular    PROSTATE SURGERY      TONSILLECTOMY       PTA Meds/Allergies: I have personally reviewed all medications and allergies. Prior to Admission medications    Medication Sig Start Date End Date Taking?  Authorizing Provider   amLODIPine (NORVASC) 10 mg tablet Take 0.5 tablets (5 mg total) by mouth daily 7/26/23  Yes Lainey Harris MD   aspirin 81 mg chewable tablet Chew 81 mg daily   Yes Historical Provider, MD   atorvastatin (LIPITOR) 40 mg tablet Take 1 tablet (40 mg total) by mouth daily  Patient taking differently: Take 40 mg by mouth every evening 7/11/23  Yes Ayah Almanza MD   cloNIDine (CATAPRES) 0.1 mg tablet Take 1 tablet (0.1 mg total) by mouth every 12 (twelve) hours 7/26/23  Yes Danilo Dumont MD   clopidogrel (PLAVIX) 75 mg tablet TAKE 1 TABLET BY MOUTH DAILY 10/31/23  Yes Ayah Almanza MD   fenofibrate 160 MG tablet Take 1 tablet (160 mg total) by mouth daily 7/11/23  Yes Ayah Almanza MD   gabapentin (NEURONTIN) 600 MG tablet TAKE 1 TABLET BY MOUTH  TWICE DAILY 2/10/23  Yes Michelle Felipe DPM   glimepiride (AMARYL) 2 mg tablet Take 1 tablet (2 mg total) by mouth 2 (two) times a day 5/22/23  Yes Raúl Finn,    isosorbide mononitrate (IMDUR) 60 mg 24 hr tablet Take 1 tablet (60 mg total) by mouth daily 7/11/23  Yes Ayah Almanza MD   Jardiance 25 MG TABS TAKE 1 TABLET BY MOUTH IN THE  MORNING 10/6/23  Yes Raúl Finn, DO   Lantus SoloStar 100 units/mL SOPN INJECT SUBCUTANEOUSLY [facility]  Patient taking differently: 15 Units 11/1/23  Yes Raúl Finn DO   metFORMIN (GLUCOPHAGE) 500 mg tablet TAKE 1 TABLET BY MOUTH  TWICE DAILY WITH MEALS 2/10/23  Yes Raúl Finn DO   metoprolol tartrate (LOPRESSOR) 50 mg tablet Take 1 tablet (50 mg total) by mouth every 12 (twelve) hours 7/26/23  Yes Danilo Dumont MD   Multiple Vitamins-Minerals (MULTIVITAMIN MEN 50+ PO) Take by mouth daily   Yes Historical Provider, MD   nitroglycerin (NITROSTAT) 0.4 mg SL tablet Place 1 tablet (0.4 mg total) under the tongue every 5 (five) minutes as needed for chest pain 3/7/23  Yes APOLLO Wheeler   Omega-3 Fatty Acids (fish oil) 1,000 mg Take 4,000 mg by mouth 2 (two) times a day   Yes Historical Provider, MD   pentoxifylline (TRENtal) 400 mg ER tablet Take 1 tablet (400 mg total) by mouth 3 (three) times a day with meals 9/22/23 11/16/23 Yes Jon Serrato DPM   ranolazine (RANEXA) 500 mg 12 hr tablet take 1 tablet by mouth twice a day 11/7/23  Yes Pancho Abbott MD   sitaGLIPtin (JANUVIA) 100 mg tablet Take 1 tablet (100 mg total) by mouth daily 10/6/23  Yes Nena Easton DO   torsemide [facility]) 20 mg tablet Take 1 tablet (20 mg total) by mouth daily 7/11/23  Yes Pancho Abbott MD   Blood Glucose Monitoring Suppl (OneTouch Verio Reflect) w/Device KIT Check blood sugars twice daily. Please substitute with appropriate alternative as covered by patient's insurance. Dx: E11.65 2/22/22   Nena Easton, DO   Droplet Pen Needles 32G X 4 MM MISC USE EVERY EVENING 9/27/23   Nena Easton, DO   glucose blood (OneTouch Verio) test strip Check blood sugars twice daily. Please substitute with appropriate alternative as covered by patient's insurance. Dx: E11.65 9/13/22   Nena Easton, DO   OneTouch Delica Lancets 47Z MISC Check blood sugars twice daily. Please substitute with appropriate alternative as covered by patient's insurance. Dx: E11.65 2/22/22   Nena Easton, DO      Allergies   Allergen Reactions    Lisinopril Rash and Lip Swelling      Objective                         Last 24 hrs:  Vitals I/O     Temp:  [97.5 °F (36.4 °C)-98.2 °F (36.8 °C)] 97.5 °F (36.4 °C)  HR:  [50-68] 54  Resp:  [7-12] 12  BP: ()/(50-55) 102/55  SpO2:  [96 %-98 %] 98 %    Intake/Output Summary (Last 24 hours) at 11/16/2023 1814  Last data filed at 11/16/2023 1601  Gross per 24 hour   Intake 6100 ml   Output 750 ml   Net 5350 ml      Diet [place]/CHO Controlled; Consistent Carbohydrate Diet Level 2 (5 carb servings/75 grams CHO/meal)      Physical Exam  Constitutional:       General: He is not in acute distress. Appearance: He is not toxic-appearing. HENT:      Head: Normocephalic and atraumatic. Mouth/Throat:      Mouth: Mucous membranes are moist.   Cardiovascular:      Rate and Rhythm: Normal rate and regular rhythm.       Heart sounds: Normal heart sounds. Pulmonary:      Effort: No respiratory distress. Breath sounds: Decreased breath sounds present. No wheezing, rhonchi or rales. Abdominal:      General: Bowel sounds are normal. There is no distension. Palpations: Abdomen is soft. Tenderness: There is no abdominal tenderness. Musculoskeletal:         General: No swelling. Normal range of motion. Cervical back: Neck supple. Right lower leg: No edema. Left lower leg: No edema. Comments: Surgical site with dressing intact and appears clean and dry; bilateral DP and PT pulses detected with doppler. Skin:     General: Skin is warm and dry. Findings: No lesion or rash. Neurological:      General: No focal deficit present. Mental Status: He is alert and oriented to person, place, and time. Motor: No weakness. Psychiatric:         Behavior: Behavior normal.        Invasive Monitoring   Arterial Line  [place] /38  Arterial Line BP  Min: 86/38  Max: 118/38   MAP (!) 54 mmHg  Arterial Line MAP (mmHg)  Min: 48 mmHg  Max: 56 mmHg        Diagnostic Studies      ECG: No EKG obtained. Imaging: I have personally reviewed pertinent reports.        Medications:  Scheduled PRN   [START ON 11/17/2023] amLODIPine, 5 mg, Oral, Daily  [START ON 11/17/2023] aspirin, 81 mg, Oral, Daily  atorvastatin, 40 mg, Oral, QPM  [START ON 11/17/2023] cloNIDine, 0.1 mg, Oral, Q12H  [START ON 11/17/2023] clopidogrel, 75 mg, Oral, Daily  [START ON 11/17/2023] fenofibrate, 145 mg, Oral, Daily  gabapentin, 600 mg, Oral, BID  heparin (porcine), 5,000 Units, Subcutaneous, Q8H [facility]  insulin lispro, 1-6 Units, Subcutaneous, 4x Daily (AC & HS)  [START ON 11/17/2023] isosorbide mononitrate, 60 mg, Oral, Daily  metoprolol tartrate, 50 mg, Oral, Q12H [facility]  pentoxifylline, 400 mg, Oral, TID With Meals  ranolazine, 500 mg, Oral, BID       lactated ringers, 500 mL, Once PRN   And  lactated ringers, 500 mL, Once PRN  sodium chloride, 500 mL, Once PRN   And  sodium chloride, 500 mL, Once PRN        Continuous    multi-electrolyte, 125 mL/hr, Last Rate: 125 mL/hr (11/16/23 1720)  sodium chloride, 125 mL/hr, Last Rate: 125 mL/hr (11/16/23 0731)          Labs:    CBC  Recent Labs     11/16/23  1251 11/16/23  1518   WBC  --  13.56*   HGB 10.2* 9.1*   HCT 30* 28.4*   PLT  --  190     BMP  Recent Labs     11/16/23  1251 11/16/23  1518   SODIUM  --  140   K  --  4.4   CL  --  114*   CO2 20* 23   AGAP  --  3   BUN  --  30*   CREATININE  --  1.57*   CALCIUM  --  7.3*       Coags  Recent Labs     11/16/23  0727   INR 1.15     LFTs  No recent results    Additional Electrolytes  Recent Labs     11/16/23  1251 11/16/23  1518   PHOS  --  2.6   CAIONIZED 1.06*  --     Blood Gas  No recent results No recent results    Infectious  No recent results  Lab Results   Component Value Date    BLOODCX No Growth After 5 Days. 06/16/2023    BLOODCX No Growth After 5 Days.  06/16/2023     Glucose  Recent Labs     11/16/23  1518   GLUC 162*         Dottie Alexander MD

## 2023-11-16 NOTE — ANESTHESIA PROCEDURE NOTES
Arterial Line Insertion    Performed by: Hiram Garcia CRNA  Authorized by: Stefan Jiménez DO  Consent: Verbal consent obtained. Written consent obtained. Risks and benefits: risks, benefits and alternatives were discussed  Consent given by: patient  Patient understanding: patient states understanding of the procedure being performed  Patient consent: the patient's understanding of the procedure matches consent given  Procedure consent: procedure consent matches procedure scheduled  Relevant documents: relevant documents present and verified  Test results: test results available and properly labeled  Site marked: the operative site was marked  Radiology Images: Radiology Images displayed and confirmed. If images not available, report reviewed  Required items: required blood products, implants, devices, and special equipment available  Patient identity confirmed: verbally with patient, arm band, provided demographic data and hospital-assigned identification number  Time out: Immediately prior to procedure a "time out" was called to verify the correct patient, procedure, equipment, support staff and site/side marked as required. Preparation: Patient was prepped and draped in the usual sterile fashion. Indications: hemodynamic monitoring  Orientation:  Right  Location: radial artery  Sedation:  Patient sedated: under GA.     Procedure Details:  Britton's test normal: yes  Needle gauge: 20  Seldinger technique: Seldinger technique used  Number of attempts: 1    Post-procedure:  Post-procedure: dressing applied  Waveform: good waveform and waveform confirmed  Patient tolerance: patient tolerated the procedure well with no immediate complications

## 2023-11-16 NOTE — PLAN OF CARE
Problem: PAIN - ADULT  Goal: Verbalizes/displays adequate comfort level or baseline comfort level  Description: Interventions:  - Encourage patient to monitor pain and request assistance  - Assess pain using appropriate pain scale  - Administer analgesics based on type and severity of pain and evaluate response  - Implement non-pharmacological measures as appropriate and evaluate response  - Consider cultural and social influences on pain and pain management  - Notify physician/advanced practitioner if interventions unsuccessful or patient reports new pain  Outcome: Progressing     Problem: INFECTION - ADULT  Goal: Absence or prevention of progression during hospitalization  Description: INTERVENTIONS:  - Assess and monitor for signs and symptoms of infection  - Monitor lab/diagnostic results  - Monitor all insertion sites, i.e. indwelling lines, tubes, and drains  - Monitor endotracheal if appropriate and nasal secretions for changes in amount and color  - [place] appropriate cooling/warming therapies per order  - Administer medications as ordered  - Instruct and encourage patient and family to use good hand hygiene technique  - Identify and instruct in appropriate isolation precautions for identified infection/condition  Outcome: Progressing  Goal: Absence of fever/infection during neutropenic period  Description: INTERVENTIONS:  - Monitor WBC    Outcome: Progressing     Problem: DISCHARGE PLANNING  Goal: Discharge to home or other facility with appropriate resources  Description: INTERVENTIONS:  - Identify barriers to discharge w/patient and caregiver  - Arrange for needed discharge resources and transportation as appropriate  - Identify discharge learning needs (meds, wound care, etc.)  - Arrange for interpretive services to assist at discharge as needed  - Refer to Case Management Department for coordinating discharge planning if the patient needs post-hospital services based on physician/advanced practitioner order or complex needs related to functional status, cognitive ability, or social support system  Outcome: Progressing     Problem: Knowledge Deficit  Goal: Patient/family/caregiver demonstrates understanding of disease process, treatment plan, medications, and discharge instructions  Description: Complete learning assessment and assess knowledge base.   Interventions:  - Provide teaching at level of understanding  - Provide teaching via preferred learning methods  Outcome: Progressing     Problem: HEMATOLOGIC - ADULT  Goal: Maintains hematologic stability  Description: INTERVENTIONS  - Assess for signs and symptoms of bleeding or hemorrhage  - Monitor labs  - Administer supportive blood products/factors as ordered and appropriate  Outcome: Progressing

## 2023-11-16 NOTE — INTERVAL H&P NOTE
H&P reviewed. After examining the patient I find no changes in the patients condition since the H&P had been written. Vitals:    11/16/23 0800   BP: 102/50   Pulse:    Temp:    SpO2:      The patient's previous arteriogram demonstrated disease not amenable to endovascular intervention. This will require surgical bypass with use of possible cryovein conduit vs autologous left greater saphenous vein.

## 2023-11-16 NOTE — OP NOTE
DATE OF PROCEDURE: 11/16/23     PERFORMING SERVICE: Vascular and Endovascular Surgery    ATTENDING SURGEON: Gianna Wild MD    PREOPERATIVE DIAGNOSIS: Right lower extremity critical limb threatening ischemia with tissue loss via foot wound. POSTOPERATIVE DIAGNOSIS: Same    PROCEDURE NAME:   1. Right common femoral artery to peroneal artery bypass with reversed CryoVein conduit. ANESTHESIA: general    EBL: 750 cc    UOP: 0 cc until his urologic device was evaluated by urology and drained in the PACU. At that point he had approximately 300 cc of urine output. IVF: 5600 cc     SPECIMENS: None    COMPLICATIONS: None, his case was complicated by extensive dense calcific disease to his tibial vessels. DRAINS: None    INDICATION: The patient is an 78-year-old male who presents with a months old right heel wound to his neuropathic right foot after stepping on a nail. This is failed to heal after extensive podiatric treatment. He underwent noninvasive studies demonstrating an adequate lower extremity vascular supply for healing to occur. He underwent an arteriogram with attempted endovascular treatment however he had a long segment occlusion of the superficial femoral artery. He underwent informed consent for right lower extremity bypass to restore inline flow to his only remaining tibial vessel being the peroneal artery. The patient underwent vein mapping and has previously had his right greater saphenous harvested for CABG. His left greater saphenous vein though adequate size is extensively diseased and varicose. Discussed the use of CryoVein cadaveric conduit and the patient is in agreement to proceed with the bypass as described. I discussed this at length with his daughters Loretta Ahumada and her back as well. INTRAOPERATIVE FINDINGS: Extensive medial calcinosis of the below-knee popliteal artery as well as the peroneal artery.     ASSISTING SURGEON: Dr. Shanika Espitia DO, Dr. Aguirre Or Jacklyn Roberto MD    DESCRIPTION OF PROCEDURE:   Informed consent was obtained from the patient prior to proceeding to the operating room. The patient was then identified in the pre-anesthesia area, then taken to the operating room, placed in the supine position on the operating table, and induced under general endotracheal anesthesia. The patient was correctly positioned, padded at all pressure points. The operative field was then prepared and draped in a sterile fashion. A pre-operative timeout was performed. Preoperative antibiotics were administered at this time. A vertical incision was made with a #15 blade over the right groin where the right femoral artery was identified. Dissection was carried down through the skin and subcutaneous tissue. The inguinal ligament was identified and subcutaneous tissue was dissected from the inferior edge medially and laterally. The common femoral artery was identified after further dissection and the anterior aspect was cleared of subcutaneous tissue moving from proximal to distal.  The region of vessel caliber change was identified indicating the CFA bifurcation. Circumferential dissection of the distal EIA, CFA, SFA, and profunda down to second order branches was then performed. The vessels were then each encircled with silastic vessel loops. We then turned our attention to the below-knee popliteal artery exposure. A longitudinal incision was made with a #15 blade on the medial aspect of the right lower extremity just below the knee and posterior to the tibia. Dissection was carried down through the skin and subcutaneous tissue and the gastrocnemius was retracted posteriorly. An avascular plane was entered and further blunt dissection was used to identify the neurovascular bundle. The below-knee popliteal artery was gently  from the nerve and vein with a combination of sharp dissection with Metzenbaum scissors and blunt dissection.   The below-knee popliteal artery was then evaluated. This artery was extensively calcified and unable to service as the target for bypass. Further dissection distally required ligation of the anterior tibial vein. This allowed for visualization and palpation of the tibial peroneal trunk. There is extensive medial calcinosis noted at this location as well as distally onto the peroneal artery which was the target identified on the arteriogram.  We proceeded to encircle the target vessel proximally and distally with Silastic Vesseloops. A Bryson tunneler was then used to create an anatomic tunnel between the common femoral and below-knee popliteal arteries. The patient was then systemically heparinized. ACTs were periodically checked and heparin was re-dosed to maintain ACT >250 throughout the case. After control of the femoral vessels with silastic vessel loops and an atraumatic vascular clamp placed on the distal EIA, an arteriotomy was created with a #11 blade and this was extended proximally and distally with Suáerz scissors. The CryoVein conduit was brought onto the field. The end of the conduit was cut to size, spatulated, and beveled. The proximal anastomosis was completed with 6-0 running Prolene suture. The graft was then brought through the previously created tunnel ensuring that no kinks or twists were introduced during the process. Appropriate 1:1 movement of the graft was appreciated after the tunneler was removed. The only short segment soft noncalcified region of the peroneal artery was identified. A tourniquet was attempted to be placed at the distal thigh. Proximal and distal Vesseloops were pulled up and a #11 blade was used to create a longitudinal arteriotomy. The tourniquet was inadequate at providing hemostasis due to the extensive calcific disease. We relied upon Vesseloops and vascular clamps for hemostasis. The arteriotomy was extended proximally distally with Suárez scissors.   There was tremendous calcific disease of the arterial supply at this location. I extended the arteriotomy distally for which there was identifiable flow channel however no soft segment for anastomosis. After returning back to the proximal peroneal artery we proceeded with the distal anastomosis. The distal aspect of the graft was was cut to size, spatulated, and beveled. The anastomosis was then created with running 6-0 Prolene suture. A second suture was utilized to identify the toe. Initially the needle was unable to pass through the dense calcific layer of the anterior medial calcinosis. There was still an identifiable large flow channel concordant with the arteriogram.  Ultimately a piece of the calcium was removed however this caused a tear in the arterial wall. This required completion of the anastomosis and multiple 6-0 Prolene repair sutures at this location. The native artery was backbled and the anastomosis was de-aired. After completion of the anastomoses, the patient was given protamine. Good hemostasis was achieved in the wound beds. The wounds were closed in multiple layers of 2-0 and 3-0 Monocryl suture, and the skin was closed with 4-0 Monocryl. The incisions were covered with dermabond and primapore dressings. The extremities were wrapped with kerlix and ACE wrap. Sarah Beth Trotter MD  11/16/23   6:18 PM    Vascular Quality Initiative - Infra-Inguinal Bypass   [id number]]          Urgency: Urgent     Side: right       Functional Status:  Fully active; able to carry on all predisease activities without restriction.    Ambulation: Amb = independently ambulatory       Ambulation:  Amb = independently ambulatory    Exercise Program:  NO        Pathology:Occlusive Disease,    Completion Assessment  Right Lower Extremity  Indication Occlusive: Ulcer/necrosis (gangrene): de ernestine tissue loss due to peripheral arterial disease, not due to non-healing prior amputation       Tissue Loss Severity: Grade 2, Deep = deeper full thickness ulcer or necrosis (gangrene) on distal leg or foot with exposed bone, joint, or tendon, or shallow heel ulcer without involvement of the calcaneus (ie, major tissue loss: salvageable with 3 digital amputations or standard transmetatarsal amputation [TMA] plus skin coverage). Infection: Grade 0, None = No symptoms or signs of infection. R Side    Tissue Loss Severity: Grade 1, Shallow = small shallow ulcer(s) on distal leg or foot, any exposed bone is only limited to distal phalanx (ie, minor tissue loss: limb salvage possible with simple digital amputation [1 or 2 digits], or skin coverage). R Side    Anesthesia: General  ASA Class: ASA 3 - Patient with moderate systemic disease with functional limitations    Skin Prep:Chlorhexidene    Graft Origin: Common Femoral    Graft Recipient: Peroneal    Graft Vein Type: None, cryovein conduit    Number of Vein Segments: 1    Prosthetic: None      Groin Incision: NONE/HORIZONTAL/VERTICAL: Vertical.   Groin Closure Type: Absorbable Subcuticular. Closure Dressing is: Cyanoacrylate Adhesive. .     Total Procedure Time:   Event Time In   Procedure Start 0928   Procedure Closing 1229   Procedure Finish 1451       Contrast Volume: 0    Fluoro Time: 0 minutes    Air Kerma: 0    EBL: 750 mL    If Graft Vein: Vein [place] Incision: N/A    Vein Graft Location: N/A    Adjuncts:  Vein Cuff:  no Sequential Graft:no    Concomitant Proximal Ipsilateral:   PVI: no    Completion Study:   Doppler: yes   Duplex: no    Arteriogram: no

## 2023-11-16 NOTE — QUICK NOTE
Post Op Check:    Patient seen and examined at bedside, in no acute distress. Patient reports minimal pain in his right leg. Patient reports pain in his elbow with movement that he attributes to the way his arm was positioned. Patient denies nausea vomiting or chills chest pain or shortness of breath. Vitals:Blood pressure 102/55, pulse (!) 54, temperature 97.5 °F (36.4 °C), temperature source Axillary, resp. rate 12, height 6' 2" (1.88 m), weight 114 kg (251 lb 15.8 oz), SpO2 98 %. Temp (24hrs), Av.8 °F (36.6 °C), Min:97.5 °F (36.4 °C), Max:98.2 °F (36.8 °C)      General: NAD  HENT: NCAT MMM  Neck: supple, no JVD  CV: nl rate  Lungs: nl wob. No resp distress  ABD: Soft, nontender, nondistended  Extrem: Right dop DP, dop PT  Neuro: AAOx3       I/O          0701  11/15 0700 11/15 0701   07 0701   0700    I.V. (mL/kg)   5600 (49.1)    IV Piggyback   500    Total Intake(mL/kg)   6100 (53.5)    Blood   750    Total Output   750    Net   +5350                   Invasive Devices       Peripheral Intravenous Line  Duration             Peripheral IV 23 Dorsal (posterior); Left;Proximal Forearm <1 day    Peripheral IV 23 Right Forearm <1 day              Arterial Line  Duration             Arterial Line 23 Right Radial <1 day              Drain  Duration             External Urinary Catheter <1 day    Urethral Catheter Coude 14 Fr. <1 day                      Plan:  Diet Kervin/CHO Controlled; Consistent Carbohydrate Diet Level 2 (5 carb servings/75 grams CHO/meal)  Continue to monitor neurovascular exam  Pain and nausea control PRN    Aakash Collado MD  2023 5:38 PM

## 2023-11-16 NOTE — ANESTHESIA PREPROCEDURE EVALUATION
Procedure:  BYPASS FEMORAL-POPLITEAL (Right: Leg Upper)    Relevant Problems   CARDIO   (+) Coronary artery disease involving native coronary artery of native heart without angina pectoris   (+) Frequent PVCs   (+) Hypertensive urgency   (+) Mild aortic stenosis   (+) Mixed hyperlipidemia   (+) Stable angina pectoris   (+) Type 2 diabetes mellitus with diabetic peripheral angiopathy without gangrene, with long-term current use of insulin (HCC)      ENDO   (+) Type 2 diabetes mellitus with diabetic peripheral angiopathy without gangrene, with long-term current use of insulin (HCC)   (+) Type 2 diabetes mellitus with diabetic polyneuropathy, with long-term current use of insulin (HCC)      /RENAL   (+) Benign hypertension with CKD (chronic kidney disease) stage III ([facility])   (+) Chronic kidney disease-mineral and bone disorder   (+) Stage 3b chronic kidney disease (HCC)      NEURO/PSYCH   (+) Stable angina pectoris      Cardiovascular and Mediastinum   (+) Acute on chronic diastolic heart failure (HCC)      Endocrine   (+) Diabetic ulcer of right midfoot associated with type 2 diabetes mellitus, limited to breakdown of skin ([address])      Other   (+) Hx of CABG   (+) S/P AAA repair        Physical Exam    Airway    Mallampati score: III  TM Distance: >3 FB  Neck ROM: full     Dental       Cardiovascular  Rhythm: regular, Rate: normal, Cardiovascular exam normal    Pulmonary  Pulmonary exam normal Breath sounds clear to auscultation    Other Findings        Anesthesia Plan  ASA Score- 4     Anesthesia Type- general with ASA Monitors. Additional Monitors: arterial line. Airway Plan: ETT. Plan Factors-Exercise tolerance (METS): <4 METS. Chart reviewed. Existing labs reviewed. Patient is not a current smoker. Obstructive sleep apnea risk education given perioperatively. Induction- intravenous.     Postoperative Plan-     Informed Consent- Anesthetic plan and risks discussed with patient.

## 2023-11-16 NOTE — ANESTHESIA POSTPROCEDURE EVALUATION
Post-Op Assessment Note    CV Status:  Stable  Pain Score: 4    Pain management: adequate       Mental Status:  Alert and awake   Hydration Status:  Euvolemic and stable   PONV Controlled:  Controlled   Airway Patency:  Patent     Post Op Vitals Reviewed: Yes    No anethesia notable event occurred.     Staff: Anesthesiologist               BP      Temp      Pulse    Resp      SpO2      /55   Pulse (!) 54   Temp 98.2 °F (36.8 °C)   Resp 12   Ht 6' 2" (1.88 m)   Wt 109 kg (239 lb 6.7 oz)   SpO2 98%   BMI 30.74 kg/m²

## 2023-11-17 ENCOUNTER — DOCUMENTATION (OUTPATIENT)
Dept: VASCULAR SURGERY | Facility: CLINIC | Age: 78
End: 2023-11-17

## 2023-11-17 LAB
ANION GAP SERPL CALCULATED.3IONS-SCNC: 3 MMOL/L
BASOPHILS # BLD AUTO: 0.02 THOUSANDS/ÂΜL (ref 0–0.1)
BASOPHILS NFR BLD AUTO: 0 % (ref 0–1)
BUN SERPL-MCNC: 29 MG/DL (ref 5–25)
CALCIUM SERPL-MCNC: 7.4 MG/DL (ref 8.4–10.2)
CHLORIDE SERPL-SCNC: 112 MMOL/L (ref 96–108)
CO2 SERPL-SCNC: 24 MMOL/L (ref 21–32)
CREAT SERPL-MCNC: 1.49 MG/DL (ref 0.6–1.3)
EOSINOPHIL # BLD AUTO: 0.06 THOUSAND/ÂΜL (ref 0–0.61)
EOSINOPHIL NFR BLD AUTO: 1 % (ref 0–6)
ERYTHROCYTE [DISTWIDTH] IN BLOOD BY AUTOMATED COUNT: 13.8 % (ref 11.6–15.1)
GFR SERPL CREATININE-BSD FRML MDRD: 43 ML/MIN/1.73SQ M
GLUCOSE SERPL-MCNC: 112 MG/DL (ref 65–140)
GLUCOSE SERPL-MCNC: 118 MG/DL (ref 65–140)
GLUCOSE SERPL-MCNC: 133 MG/DL (ref 65–140)
GLUCOSE SERPL-MCNC: 145 MG/DL (ref 65–140)
GLUCOSE SERPL-MCNC: 169 MG/DL (ref 65–140)
GLUCOSE SERPL-MCNC: 193 MG/DL (ref 65–140)
HCT VFR BLD AUTO: 25.7 % (ref 36.5–49.3)
HGB BLD-MCNC: 8 G/DL (ref 12–17)
IMM GRANULOCYTES # BLD AUTO: 0.03 THOUSAND/UL (ref 0–0.2)
IMM GRANULOCYTES NFR BLD AUTO: 0 % (ref 0–2)
LYMPHOCYTES # BLD AUTO: 1.93 THOUSANDS/ÂΜL (ref 0.6–4.47)
LYMPHOCYTES NFR BLD AUTO: 21 % (ref 14–44)
MCH RBC QN AUTO: 27.7 PG (ref 26.8–34.3)
MCHC RBC AUTO-ENTMCNC: 31.1 G/DL (ref 31.4–37.4)
MCV RBC AUTO: 89 FL (ref 82–98)
MONOCYTES # BLD AUTO: 0.61 THOUSAND/ÂΜL (ref 0.17–1.22)
MONOCYTES NFR BLD AUTO: 7 % (ref 4–12)
NEUTROPHILS # BLD AUTO: 6.37 THOUSANDS/ÂΜL (ref 1.85–7.62)
NEUTS SEG NFR BLD AUTO: 71 % (ref 43–75)
NRBC BLD AUTO-RTO: 0 /100 WBCS
PLATELET # BLD AUTO: 155 THOUSANDS/UL (ref 149–390)
PMV BLD AUTO: 11 FL (ref 8.9–12.7)
POTASSIUM SERPL-SCNC: 3.8 MMOL/L (ref 3.5–5.3)
RBC # BLD AUTO: 2.89 MILLION/UL (ref 3.88–5.62)
SODIUM SERPL-SCNC: 139 MMOL/L (ref 135–147)
WBC # BLD AUTO: 9.02 THOUSAND/UL (ref 4.31–10.16)

## 2023-11-17 PROCEDURE — 99222 1ST HOSP IP/OBS MODERATE 55: CPT | Performed by: PODIATRIST

## 2023-11-17 PROCEDURE — 0JBQ0ZZ EXCISION OF RIGHT FOOT SUBCUTANEOUS TISSUE AND FASCIA, OPEN APPROACH: ICD-10-PCS | Performed by: PODIATRIST

## 2023-11-17 PROCEDURE — 11042 DBRDMT SUBQ TIS 1ST 20SQCM/<: CPT | Performed by: PODIATRIST

## 2023-11-17 PROCEDURE — 85025 COMPLETE CBC W/AUTO DIFF WBC: CPT

## 2023-11-17 PROCEDURE — 99232 SBSQ HOSP IP/OBS MODERATE 35: CPT | Performed by: NURSE PRACTITIONER

## 2023-11-17 PROCEDURE — 82948 REAGENT STRIP/BLOOD GLUCOSE: CPT

## 2023-11-17 PROCEDURE — 99024 POSTOP FOLLOW-UP VISIT: CPT | Performed by: SURGERY

## 2023-11-17 PROCEDURE — 99232 SBSQ HOSP IP/OBS MODERATE 35: CPT

## 2023-11-17 PROCEDURE — 80048 BASIC METABOLIC PNL TOTAL CA: CPT

## 2023-11-17 RX ORDER — OXYCODONE HYDROCHLORIDE 5 MG/1
5 TABLET ORAL EVERY 4 HOURS PRN
Status: DISCONTINUED | OUTPATIENT
Start: 2023-11-17 | End: 2023-11-21 | Stop reason: HOSPADM

## 2023-11-17 RX ORDER — HYDROMORPHONE HCL/PF 1 MG/ML
0.5 SYRINGE (ML) INJECTION EVERY 6 HOURS PRN
Status: DISCONTINUED | OUTPATIENT
Start: 2023-11-17 | End: 2023-11-21 | Stop reason: HOSPADM

## 2023-11-17 RX ORDER — OXYCODONE HYDROCHLORIDE 10 MG/1
10 TABLET ORAL EVERY 4 HOURS PRN
Status: DISCONTINUED | OUTPATIENT
Start: 2023-11-17 | End: 2023-11-21 | Stop reason: HOSPADM

## 2023-11-17 RX ORDER — ACETAMINOPHEN 325 MG/1
650 TABLET ORAL EVERY 6 HOURS SCHEDULED
Status: DISCONTINUED | OUTPATIENT
Start: 2023-11-17 | End: 2023-11-21 | Stop reason: HOSPADM

## 2023-11-17 RX ADMIN — OXYCODONE HYDROCHLORIDE 10 MG: 10 TABLET ORAL at 08:08

## 2023-11-17 RX ADMIN — HEPARIN SODIUM 5000 UNITS: 5000 INJECTION INTRAVENOUS; SUBCUTANEOUS at 14:33

## 2023-11-17 RX ADMIN — CHLORHEXIDINE GLUCONATE 15 ML: 1.2 RINSE ORAL at 08:16

## 2023-11-17 RX ADMIN — ASPIRIN 81 MG CHEWABLE TABLET 81 MG: 81 TABLET CHEWABLE at 08:16

## 2023-11-17 RX ADMIN — HYDROMORPHONE HYDROCHLORIDE 0.5 MG: 1 INJECTION, SOLUTION INTRAMUSCULAR; INTRAVENOUS; SUBCUTANEOUS at 04:09

## 2023-11-17 RX ADMIN — GABAPENTIN 600 MG: 300 CAPSULE ORAL at 08:16

## 2023-11-17 RX ADMIN — FENOFIBRATE 145 MG: 145 TABLET, FILM COATED ORAL at 08:16

## 2023-11-17 RX ADMIN — ACETAMINOPHEN 325MG 650 MG: 325 TABLET ORAL at 17:44

## 2023-11-17 RX ADMIN — CLONIDINE HYDROCHLORIDE 0.1 MG: 0.1 TABLET ORAL at 23:44

## 2023-11-17 RX ADMIN — SODIUM CHLORIDE, SODIUM GLUCONATE, SODIUM ACETATE, POTASSIUM CHLORIDE, MAGNESIUM CHLORIDE, SODIUM PHOSPHATE, DIBASIC, AND POTASSIUM PHOSPHATE 125 ML/HR: .53; .5; .37; .037; .03; .012; .00082 INJECTION, SOLUTION INTRAVENOUS at 04:10

## 2023-11-17 RX ADMIN — PENTOXIFYLLINE 400 MG: 400 TABLET, EXTENDED RELEASE ORAL at 08:17

## 2023-11-17 RX ADMIN — HYDROMORPHONE HYDROCHLORIDE 0.5 MG: 1 INJECTION, SOLUTION INTRAMUSCULAR; INTRAVENOUS; SUBCUTANEOUS at 08:09

## 2023-11-17 RX ADMIN — HYDROMORPHONE HYDROCHLORIDE 0.5 MG: 1 INJECTION, SOLUTION INTRAMUSCULAR; INTRAVENOUS; SUBCUTANEOUS at 20:45

## 2023-11-17 RX ADMIN — HEPARIN SODIUM 5000 UNITS: 5000 INJECTION INTRAVENOUS; SUBCUTANEOUS at 05:37

## 2023-11-17 RX ADMIN — HEPARIN SODIUM 5000 UNITS: 5000 INJECTION INTRAVENOUS; SUBCUTANEOUS at 21:26

## 2023-11-17 RX ADMIN — ATORVASTATIN CALCIUM 40 MG: 40 TABLET, FILM COATED ORAL at 17:44

## 2023-11-17 RX ADMIN — PENTOXIFYLLINE 400 MG: 400 TABLET, EXTENDED RELEASE ORAL at 12:26

## 2023-11-17 RX ADMIN — CLOPIDOGREL BISULFATE 75 MG: 75 TABLET ORAL at 08:16

## 2023-11-17 RX ADMIN — INSULIN LISPRO 2 UNITS: 100 INJECTION, SOLUTION INTRAVENOUS; SUBCUTANEOUS at 12:25

## 2023-11-17 RX ADMIN — OXYCODONE HYDROCHLORIDE 10 MG: 10 TABLET ORAL at 18:41

## 2023-11-17 RX ADMIN — OXYCODONE HYDROCHLORIDE 10 MG: 10 TABLET ORAL at 14:32

## 2023-11-17 RX ADMIN — CHLORHEXIDINE GLUCONATE 15 ML: 1.2 RINSE ORAL at 20:49

## 2023-11-17 RX ADMIN — HYDROMORPHONE HYDROCHLORIDE 0.5 MG: 1 INJECTION, SOLUTION INTRAMUSCULAR; INTRAVENOUS; SUBCUTANEOUS at 14:31

## 2023-11-17 RX ADMIN — GABAPENTIN 600 MG: 300 CAPSULE ORAL at 17:44

## 2023-11-17 RX ADMIN — INSULIN GLARGINE 15 UNITS: 100 INJECTION, SOLUTION SUBCUTANEOUS at 21:26

## 2023-11-17 RX ADMIN — RANOLAZINE 500 MG: 500 TABLET, EXTENDED RELEASE ORAL at 21:26

## 2023-11-17 RX ADMIN — RANOLAZINE 500 MG: 500 TABLET, EXTENDED RELEASE ORAL at 08:16

## 2023-11-17 RX ADMIN — CLONIDINE HYDROCHLORIDE 0.1 MG: 0.1 TABLET ORAL at 12:26

## 2023-11-17 RX ADMIN — ACETAMINOPHEN 325MG 650 MG: 325 TABLET ORAL at 23:43

## 2023-11-17 NOTE — PROGRESS NOTES
[facility]  Progress Note  Name: Judith Pink  MRN: [id number]  Unit/Bed#: ICU 10 I Date of Admission: 11/16/2023   Date of Service: 11/17/2023 I Hospital Day: 1    Assessment/Plan   * Diabetic ulcer of right midfoot associated with diabetes mellitus due to underlying condition, limited to breakdown of skin [facility])  Assessment & Plan  Lab Results   Component Value Date    HGBA1C 7.0 (H) 10/04/2023     POA: Right lower extremity CLTI with chronic nonhealing foot ulcer status post right femoral popliteal bypass with pulses detected by Doppler and hemodynamically stable with BP within goal range. Plan:  Close hemodynamic and neurovascular monitoring  Continue IVF with Isolyte at 125 mL/h   As needed IVF bolus ordered by primary team to maintain -160  Tylenol as needed for mild pain  Optimize BG control to facilitate wound healing  Podiatry consulted by primary team - appreciate input  Possible discharge today 11/17/2023 per primary team      Type 2 diabetes mellitus with diabetic polyneuropathy, with long-term current use of insulin [facility])  Assessment & Plan  Lab Results   Component Value Date    HGBA1C 7.0 (H) 10/04/2023       Recent Labs     11/16/23  0722 11/16/23  1723   POCGLU 162* 158*       Blood Sugar Average: Last 72 hrs:  (P) 160. On oral hypoglycemic meds and long-acting Lantus insulin 15 units nightly as outpatient.     Plan:  Hold PTA oral meds while inpatient  Continue home Lantus 15 units nightly  SSI correction with Accu-Cheks 4 times daily with meals and at bedtime  Adjust insulin regimen as needed based on BG levels, goal 140-180  Level 2 CARB/CHO controlled diabetic diet  Hypoglycemia protocol    Peripheral artery disease (HCC)  Assessment & Plan  Continue home ASA, Plavix, and pentoxifylline    Coronary artery disease involving native coronary artery of native heart without angina pectoris  Assessment & Plan  Continue PTA ASA, Plavix, BB, statin, ranolazine and Imdur    Stage 3b chronic kidney disease [facility])  Assessment & Plan  Lab Results   Component Value Date    EGFR 43 11/17/2023    EGFR 41 11/16/2023    EGFR 32 10/24/2023    CREATININE 1.49 (H) 11/17/2023    CREATININE 1.57 (H) 11/16/2023    CREATININE 1.89 (H) 10/24/2023   Monitor kidney indices  Avoid nephrotoxic medications    Primary hypertension  Assessment & Plan  Continue home antihypertensive regimen as per primary team    Chronic HFpEF/Moderate pHTN (HFpEF) ([address])  Assessment & Plan  Wt Readings from Last 3 Encounters:   11/16/23 114 kg (251 lb 15.8 oz)   11/01/23 108 kg (239 lb)   10/31/23 108 kg (239 lb)       Intake/Output Summary (Last 24 hours) at 11/16/2023 2100  Last data filed at 11/16/2023 2000  Gross per 24 hour   Intake 6433.33 ml   Output 900 ml   Net 5533.33 ml     No evidence of acute exacerbation. Maintained on torsemide as outpatient. Plan:  Monitor UOP and volume status  Home torsemide on hold as per primary team         HPI/24hr events:   No overnight events, dopplerable pulses remain in the right lower extremity.     Medications:  Current Facility-Administered Medications   Medication Dose Route Frequency Provider Last Rate    amLODIPine  5 mg Oral Daily Radhika Etienne MD      aspirin  81 mg Oral Daily Radhika Etienne MD      atorvastatin  40 mg Oral QPM Radhika Etienne MD      chlorhexidine  15 mL Mouth/Throat Q12H [facility] [facility] MD Shyla      cloNIDine  0.1 mg Oral Q12H Radhika Etienne MD      clopidogrel  75 mg Oral Daily Radhika Etienne MD      fenofibrate  145 mg Oral Daily Radhika Etienne MD      gabapentin  600 mg Oral BID Radhika Etienne MD      heparin (porcine)  5,000 Units Subcutaneous [facility] Radhika Etienne MD      HYDROmorphone  0.5 mg Intravenous Q3H PRN Dee Dugan MD      insulin glargine  15 Units Subcutaneous HS Lis Cantor MD      insulin lispro  1-6 Units Subcutaneous 4x Daily (AC & HS) Lis Cantor MD      isosorbide mononitrate  60 mg Oral Daily Radhika Etienne MD lactated ringers  500 mL Intravenous Once PRN Loy Esparza  mL (11/16/23 2207)    And    lactated ringers  500 mL Intravenous Once PRN Loy Esparza  mL (11/16/23 2030)    metoprolol tartrate  50 mg Oral Q12H Marie Cuenca MD      ondansetron  4 mg Intravenous Q4H PRN Jatinder Meneses MD      oxyCODONE  10 mg Oral Q6H PRN Jatinder Meneses MD      oxyCODONE  5 mg Oral Q6H PRN Jatinder Meneses MD      pentoxifylline  400 mg Oral TID With Meals Loy Esparza MD      ranolazine  500 mg Oral BID Loy Esparza MD      sodium chloride  500 mL Intravenous Once PRN Loy Esparza MD      And    sodium chloride  500 mL Intravenous Once PRN Loy Esparza MD                Physical exam:  Vitals: Body mass index is 32.32 kg/m². Blood pressure 120/58, pulse 64, temperature 98 °F (36.7 °C), temperature source Oral, resp. rate 14, height 6' 2" (1.88 m), weight 114 kg (251 lb 12.3 oz), SpO2 92 %. ,  Temp  Min: 97.5 °F (36.4 °C)  Max: 98.2 °F (36.8 °C)  IBW (Ideal Body Weight): 82.2 kg    SpO2: 92 %  SpO2 Activity: At Rest  O2 Device: None (Room air)      Intake/Output Summary (Last 24 hours) at 11/17/2023 0650  Last data filed at 11/17/2023 0548  Gross per 24 hour   Intake 7433.33 ml   Output 1500 ml   Net 5933.33 ml       Invasive/non-invasive ventilation settings:   Respiratory      Lab Data (Last 4 hours)      None           O2/Vent Data (Last 4 hours)      None                  Invasive Devices       Peripheral Intravenous Line  Duration             Peripheral IV 11/16/23 Dorsal (posterior); Left;Proximal Forearm <1 day    Peripheral IV 11/16/23 Right Forearm <1 day              Arterial Line  Duration             Arterial Line 11/16/23 Right Radial <1 day              Drain  Duration             External Urinary Catheter <1 day    Urethral Catheter Coude 14 Fr. <1 day                      Physical Exam:  Gen: No acute distress  HEENT: Atraumatic normocephalic pupils equal round reactive to light extraocular movements intact sclera anicteric oral mucosa is pink and moist  Neck: Supple no JVD no lymphadenopathy trachea midline  Chest: Clear to auscultation bilaterally respirations even and unlabored  Cor: Regular rate and rhythm no murmurs rubs or gallops appreciated  Abd: Soft nontender with positive bowel sounds  Ext: No clubbing cyanosis or edema. Right lower extremity with clean and dry surgical site and dopplerable pulses  Neuro: Alert and oriented x3 moves all extremities  Skin: Warm dry and intact no rash      Diagnostic Data:  Lab: I have personally reviewed pertinent lab results. CBC:   Results from last 7 days   Lab Units 11/17/23  0537 11/16/23  1518 11/16/23  1251   WBC Thousand/uL 9.02 13.56*  --    HEMOGLOBIN g/dL 8.0* 9.1*  --    I STAT HEMOGLOBIN g/dl  --   --  10.2*   HEMATOCRIT % 25.7* 28.4*  --    HEMATOCRIT, ISTAT %  --   --  30*   PLATELETS Thousands/uL 155 190  --        CMP:   Results from last 7 days   Lab Units 11/17/23  0537 11/16/23  1518 11/16/23  1251   SODIUM mmol/L 139 140  --    POTASSIUM mmol/L 3.8 4.4  --    CHLORIDE mmol/L 112* 114*  --    CO2 mmol/L 24 23  --    CO2, I-STAT mmol/L  --   --  20*   BUN mg/dL 29* 30*  --    CREATININE mg/dL 1.49* 1.57*  --    CALCIUM mg/dL 7.4* 7.3*  --    GLUCOSE, ISTAT mg/dl  --   --  159*     PT/INR:   Lab Results   Component Value Date    INR 1.15 11/16/2023   ,   Magnesium:   Results from last 7 days   Lab Units 11/16/23  1518   MAGNESIUM mg/dL 1.4*     Phosphorous:   Results from last 7 days   Lab Units 11/16/23  1518   PHOSPHORUS mg/dL 2.6       Microbiology:    No current microbiology    Imaging:  No new imaging    Cardiac lab/EKG/telemetry/ECHO:   Normal sinus rhythm    VTE Prophylaxis: Heparin    Code Status: Level 3 - DNAR and DNI    Frances Bunting, CRNP    Portions of the record may have been created with voice recognition software.  Occasional wrong word or "sound a like" substitutions may have occurred due to the inherent limitations of voice recognition software. Read the chart carefully and recognize, using context, where substitutions have occurred.

## 2023-11-17 NOTE — ASSESSMENT & PLAN NOTE
Lab Results   Component Value Date    EGFR 43 11/17/2023    EGFR 41 11/16/2023    EGFR 32 10/24/2023    CREATININE 1.49 (H) 11/17/2023    CREATININE 1.57 (H) 11/16/2023    CREATININE 1.89 (H) 10/24/2023   Monitor kidney indices  Avoid nephrotoxic medications

## 2023-11-17 NOTE — ASSESSMENT & PLAN NOTE
Lab Results   Component Value Date    EGFR 43 11/17/2023    EGFR 41 11/16/2023    EGFR 32 10/24/2023    CREATININE 1.49 (H) 11/17/2023    CREATININE 1.57 (H) 11/16/2023    CREATININE 1.89 (H) 10/24/2023       POA: creatinine at baseline 1.5-1.6.      Plan:  Monitor UOP and renal indices  Avoid hypotension and nephrotoxins

## 2023-11-17 NOTE — ASSESSMENT & PLAN NOTE
Lab Results   Component Value Date    HGBA1C 7.0 (H) 10/04/2023       Recent Labs     11/16/23  0722 11/16/23  1723   POCGLU 162* 158*       Blood Sugar Average: Last 72 hrs:  (P) 160. On oral hypoglycemic meds and long-acting Lantus insulin 15 units nightly as outpatient.     Plan:  Hold PTA oral meds while inpatient  Continue home Lantus 15 units nightly  SSI correction with Accu-Cheks 4 times daily with meals and at bedtime  Adjust insulin regimen as needed based on BG levels, goal 140-180  Level 2 CARB/CHO controlled diabetic diet  Hypoglycemia protocol

## 2023-11-17 NOTE — ASSESSMENT & PLAN NOTE
77 yo male w/ a hx of remote smoker, DM II, HTN, CKD III, CAD, HLD, PAD, AAA S/P EVAR, dCHF and frequent PVCs presented to [facility] on 11/16/23 for a scheduled RLE bypass. Pt has a hx of PAD w/ a non-healing R heel wound. Pt had a RLE angiogram which showed a long segment occlusion of SFA, AT and PT. Decision was made to bypass to the peroneal artery, which is the only remaining patent tibial vessel. Pt underwent R CFA to peroneal artery bypass w/ reversed cryovein on 11/16/23. Podiatry consulted for non-healing R heel wound. Wound was debrided at bedside w/ plan for local wound care. Urology consulted re: pt's inability to void. Pt has a hx of a robot-assisted radical prostatectomy in 2015 w/ artificial urinary sphincter placement in 2017. Gilbert placed. Per urology, gilbert should remain in place until at least tomorrow, or until pt is more mobile. Plan:  -RLE CLTI w/ tissue loss  -S/P R CFA to peroneal artery bypass w/ reversed cryovein on 11/16  -R calf and groin incisions w/ silver mepilex intact, no strike-through  -R bypass, DP/PT and peroneal signals  -Continue neurovascular checks  -VS remain stable; D/C ayaz  -Pt reports chronic B/L LE neuropathy and numbness remains unchanged  -Consult PT/OT  -Urology following for hx of artificial urinary sphincter and inability to void; input appreciated  -Continue gilbert, per urology recommendations  -Podiatry following for local wound care; input appreciated. No plans for surgical intervention at this time.   -ASA, plavix, lipitor and trental for medical management of PAD  -Will discuss w/ Dr. Burch Blocker Doctor

## 2023-11-17 NOTE — DISCHARGE INSTR - AVS FIRST PAGE
Discharge Instructions - Podiatry    Weight Bearing Status: Weight bearing as tolerated wearing heel offloading shoe on the right foot at all times        Pain: Continue analgesics as directed    Follow-up appointment instructions: Please make an appointment within one week of discharge at [facility]. Contact sooner if any increase in pain, or signs of infection occur    Wound Care: Leave dressings clean, dry, and intact between professional dressing changes    Nursing Instructions: Please apply betadine to the right heel wound. Cover with gauze, then apply Allevyn pad. Change dressings every 3 days. DISCHARGE INSTRUCTIONS  LEG/BYPASS SURGERY    ACTIVITY:   Limit your physical activity to walking for the first week and then increase your activity as tolerated. If you become short of breath or tired, stop and rest.  You may require help with walking or feel more secure with something to lean on. Walking up steps and normal activities may be resumed as you feel ready. Most people tire easily for the first few weeks following leg surgery. This improves as conditioning returns. Avoid strenuous activity such as vigorous exercise. Avoid heavy lifting (do not lift more than 15 pounds) for the first four weeks after surgery. You should not drive a car for at least two weeks following discharge from the hospital and you are off all narcotic pain medication. You may ride in a car. DIET:  Resume your normal diet. Good nutrition is important for healing of your incision. If you are discharged on narcotics for pain control, continue taking your stool softeners until you are having regular bowel movements. INCISION:  You should shower daily. Wash incision daily with soap and water, but do not rub or scrub the incision; rinse thoroughly and pat dry. You may have stitches or staples to close your incision and it is okay for these to get wet.   Do not bathe in a tub or swim for the first 4 week following surgery or if you have any open wounds. It is normal to have swelling or discoloration around the incision. If increasing redness or pain develops, call our office immediately. Numbness in the region of the incision may occur following the surgery. This normally improves over six to twelve months. You may have surgical glue over your incisions. There are stitches present under the skin which will absorb on their own. The glue is used to cover the access, assist in closure, and prevent contamination. This adhesive will darken and peel away on its own within one to two weeks. Do not pick at it. If you have a groin wound/incision, place a clean dry piece of gauze to cover your groin incision to keep incision clean and dry and prevent your skin from sticking together. Change gauze daily. You may have staples or stitches at your incisions. These will be removed at your follow-up appointment or when they are ready to come out. If you have a dressing over your surgical site, remove this on the second day after surgery. If you have foot or leg wounds, please follow your podiatrist/wound care doctor's instructions for care. If any of your incisions are open and require dressing changes, you will be given instructions for your daily incision care. If you are not able to change the dressings, a visiting nurse will be arranged. DO NOT put any powders, creams, ointments, or lotions on your incision. LEG SWELLING: Most patients have noticeable leg swelling after leg surgery. This usually improves within a few weeks. If swelling is present, elevate the leg whenever possible. Avoid sitting with the leg hanging down for prolonged periods of time. Walking is beneficial.  An ACE bandage or support stocking may be helpful, but this should be discussed with your physician prior to use if you have a bypass.     FOLLOW UP STUDIES:  Doppler ultrasound studies are very important for long-term management. Your surgeon will arrange this at your first postoperative visit. Repeat studies are then scheduled every three months for the first year and periodically after this. FOLLOW UP APPOINTMENTS:  Making and keeping follow up appointments and ultrasound tests are important to your recovery. If you have difficulty making it to or keeping your follow up appointments, call the office. If you have increased pain, fever >101.5, increased drainage, redness or a bad smell at your surgery site, new coldness/numbness of your arm or leg, please call us immediately and GO directly to the ER. PLEASE CALL THE OFFICE IF YOU HAVE ANY QUESTIONS  [phone]  [phone]  [address] [facility] [address], [facility], [address] [facility]  [address] [facility], [address]  [address], [facility], [address] [facility]  [address] [facility], [address] [facility], [address]  1001 [facility],Sixth Floor, 1st Floor, [place], [address]  [address], [address], [address], [place], [address]  [address], [address] [place] [address] , [facility], [address] [facility]  1501 [facility], 319 [facility], [address] [place] [address] [address]  [address] [place] [facility]  [address]

## 2023-11-17 NOTE — UTILIZATION REVIEW
Initial Clinical Review    Elective  IP    surgical procedure    Age/Sex: 78 y.o. male    Surgery Date:  11/16/23    Procedure: Right common femoral artery to peroneal artery bypass with reversed CryoVein conduit. Anesthesia:  general      POD#1 Progress Note:   11/17  Continue post op care. Continue   IVF. Continue neuro vascular checks. Has post op pain,  general weakness. Needs  PT/OT. Keep gilbert catheter intact for additional day. Continue pain control as needed. Monitor labs.   Hemoglobin 8 this am.    Admission Orders: Date/Time/Statement:   Admission Orders (From admission, onward)       Ordered        11/16/23 1435  Inpatient Admission  Once                          Orders Placed This Encounter   Procedures    Inpatient Admission     Standing Status:   Standing     Number of Occurrences:   1     Order Specific Question:   Level of Care     Answer:   Critical Care [15]     Order Specific Question:   Estimated length of stay     Answer:   Inpatient Only Surgery     Vital Signs: BP (!) 116/48 (BP Location: Right arm)   Pulse 72   Temp 98.1 °F (36.7 °C) (Oral)   Resp 13   Ht 6' 2" (1.88 m)   Wt 114 kg (251 lb 12.3 oz)   SpO2 95%   BMI 32.32 kg/m²     Pertinent Labs/Diagnostic Test Results:   IR lower extremity angiogram    (Results Pending)         Results from last 7 days   Lab Units 11/17/23  0537 11/16/23  1518 11/16/23  1251   WBC Thousand/uL 9.02 13.56*  --    HEMOGLOBIN g/dL 8.0* 9.1*  --    I STAT HEMOGLOBIN g/dl  --   --  10.2*   HEMATOCRIT % 25.7* 28.4*  --    HEMATOCRIT, ISTAT %  --   --  30*   PLATELETS Thousands/uL 155 190  --    NEUTROS ABS Thousands/µL 6.37 10.77*  --          Results from last 7 days   Lab Units 11/17/23  0537 11/16/23  1518 11/16/23  1251   SODIUM mmol/L 139 140  --    POTASSIUM mmol/L 3.8 4.4  --    CHLORIDE mmol/L 112* 114*  --    CO2 mmol/L 24 23  --    CO2, I-STAT mmol/L  --   --  20*   ANION GAP mmol/L 3 3  --    BUN mg/dL 29* 30*  --    CREATININE mg/dL 1.49* 1.57*  --    EGFR ml/min/1.73sq m 43 41  --    CALCIUM mg/dL 7.4* 7.3*  --    CALCIUM, IONIZED, ISTAT mmol/L  --   --  1.06*   MAGNESIUM mg/dL  --  1.4*  --    PHOSPHORUS mg/dL  --  2.6  --          Results from last 7 days   Lab Units 11/17/23  0802 11/16/23  2154 11/16/23  1723 11/16/23  1517 11/16/23  0722   POC GLUCOSE mg/dl 118 150* 158* 169* 162*     Results from last 7 days   Lab Units 11/17/23  0537 11/16/23  1518   GLUCOSE RANDOM mg/dL 112 162*           Results from last 7 days   Lab Units 11/16/23  1251   I STAT BASE EXC mmol/L -4*   I STAT O2 SAT % 100*   ISTAT PH ART  7.454*   I STAT ART PCO2 mm HG 27.1*   I STAT ART PO2 mm .0*   I STAT ART HCO3 mmol/L 19.0*                 Results from last 7 days   Lab Units 11/16/23  0727   PROTIME seconds 14.9*   INR  1.15   PTT seconds 29             Results from last 7 days   Lab Units 11/16/23  1518   LACTIC ACID mmol/L 1.0                         Results from last 7 days   Lab Units 11/16/23  1408   UNIT PRODUCT CODE  [id number]  [id number]   UNIT NUMBER  [id number]  [id number]   UNITABO  A  A   UNITRH  POS  POS   CROSSMATCH  Compatible  Compatible   UNIT DISPENSE STATUS  Crossmatched  Crossmatched   UNIT PRODUCT VOL ml 350  350                 Diet:  CCD    Mobility:  PT/OT    DVT Prophylaxis:  SCD's    Medications/Pain Control:   Scheduled Medications:  amLODIPine, 5 mg, Oral, Daily  aspirin, 81 mg, Oral, Daily  atorvastatin, 40 mg, Oral, QPM  chlorhexidine, 15 mL, Mouth/Throat, Q12H CONSTANTINE  cloNIDine, 0.1 mg, Oral, Q12H  clopidogrel, 75 mg, Oral, Daily  fenofibrate, 145 mg, Oral, Daily  gabapentin, 600 mg, Oral, BID  heparin (porcine), 5,000 Units, Subcutaneous, Q8H CONSTANTINE  insulin glargine, 15 Units, Subcutaneous, HS  insulin lispro, 1-6 Units, Subcutaneous, 4x Daily (AC & HS)  isosorbide mononitrate, 60 mg, Oral, Daily  metoprolol tartrate, 50 mg, Oral, Q12H CONSTANTINE  pentoxifylline, 400 mg, Oral, TID With Meals  ranolazine, 500 mg, Oral, BID      Continuous IV Infusions:     PRN Meds:  HYDROmorphone, 0.5 mg, Intravenous, Q3H PRN  lactated ringers, 500 mL, Intravenous, Once PRN   And  lactated ringers, 500 mL, Intravenous, Once PRN  ondansetron, 4 mg, Intravenous, Q4H PRN  oxyCODONE, 10 mg, Oral, Q6H PRN  oxyCODONE, 5 mg, Oral, Q6H PRN  sodium chloride, 500 mL, Intravenous, Once PRN   And  sodium chloride, 500 mL, Intravenous, Once PRN    Neurovascular checks  Q 2 hrs  Fall precautions  Dysphagia eval    Network Utilization Review Department  ATTENTION: Please call with any questions or concerns to [phone] and carefully listen to the prompts so that you are directed to the right person. All voicemails are confidential.   For Discharge needs, contact Care Management DC Support Team at [phone] opt. [phone]  Send all requests for admission clinical reviews, approved or denied determinations and any other requests to dedicated fax number below belonging to the campus where the patient is receiving treatment.  List of dedicated fax numbers for the Facilities:  [place] DENIALS (Administrative/Medical Necessity) [phone]   DISCHARGE SUPPORT TEAM (NETWORK) [address] [facility] (Maternity/NICU/Pediatrics) [phone]   [address] [facility] [address] 1000 [facility] [phone]   15[facility] [address] [facility] [address] [address] [facility] [address] [facility] [address] [place] [address] [facility] [phone]

## 2023-11-17 NOTE — ASSESSMENT & PLAN NOTE
Wt Readings from Last 3 Encounters:   11/17/23 114 kg (251 lb 12.3 oz)   11/01/23 108 kg (239 lb)   10/31/23 108 kg (239 lb)     No evidence of acute exacerbation. Maintained on torsemide as outpatient.      Plan:  Monitor UOP and volume status  Home torsemide on hold as per primary team

## 2023-11-17 NOTE — PLAN OF CARE
Problem: PAIN - ADULT  Goal: Verbalizes/displays adequate comfort level or baseline comfort level  Description: Interventions:  - Encourage patient to monitor pain and request assistance  - Assess pain using appropriate pain scale  - Administer analgesics based on type and severity of pain and evaluate response  - Implement non-pharmacological measures as appropriate and evaluate response  - Consider cultural and social influences on pain and pain management  - Notify physician/advanced practitioner if interventions unsuccessful or patient reports new pain  Outcome: Progressing     Problem: INFECTION - ADULT  Goal: Absence or prevention of progression during hospitalization  Description: INTERVENTIONS:  - Assess and monitor for signs and symptoms of infection  - Monitor lab/diagnostic results  - Monitor all insertion sites, i.e. indwelling lines, tubes, and drains  - Monitor endotracheal if appropriate and nasal secretions for changes in amount and color  - [place] appropriate cooling/warming therapies per order  - Administer medications as ordered  - Instruct and encourage patient and family to use good hand hygiene technique  - Identify and instruct in appropriate isolation precautions for identified infection/condition  Outcome: Progressing  Goal: Absence of fever/infection during neutropenic period  Description: INTERVENTIONS:  - Monitor WBC    Outcome: Progressing     Problem: SAFETY ADULT  Goal: Patient will remain free of falls  Description: INTERVENTIONS:  - Educate patient/family on patient safety including physical limitations  - Instruct patient to call for assistance with activity   - Consult OT/PT to assist with strengthening/mobility   - Keep Call bell within reach  - Keep bed low and locked with side rails adjusted as appropriate  - Keep care items and personal belongings within reach  - Initiate and maintain comfort rounds  - Make Fall Risk Sign visible to staff  - Offer Toileting every 2 Hours, in advance of need  - Initiate/Maintain bed alarm  - Obtain necessary fall risk management equipment:   - Apply yellow socks and bracelet for high fall risk patients  - Consider moving patient to room near nurses station  Outcome: Progressing  Goal: Maintain or return to baseline ADL function  Description: INTERVENTIONS:  -  Assess patient's ability to carry out ADLs; assess patient's baseline for ADL function and identify physical deficits which impact ability to perform ADLs (bathing, care of mouth/teeth, toileting, grooming, dressing, etc.)  - Assess/evaluate cause of self-care deficits   - Assess range of motion  - Assess patient's mobility; develop plan if impaired  - Assess patient's need for assistive devices and provide as appropriate  - Encourage maximum independence but intervene and supervise when necessary  - Involve family in performance of ADLs  - Assess for home care needs following discharge   - Consider OT consult to assist with ADL evaluation and planning for discharge  - Provide patient education as appropriate  Outcome: Progressing  Goal: Maintains/Returns to pre admission functional level  Description: INTERVENTIONS:  - Perform AM-PAC 6 Click Basic Mobility/ Daily Activity assessment daily.  - Set and communicate daily mobility goal to care team and patient/family/caregiver. - Collaborate with rehabilitation services on mobility goals if consulted  - Perform Range of Motion 8 times a day. - Reposition patient every 2 hours.   - Dangle patient 3 times a day  - Stand patient 3 times a day  - Ambulate patient 3 times a day  - Out of bed to chair 3 times a day   - Out of bed for meals 3 times a day  - Out of bed for toileting  - Record patient progress and toleration of activity level   Outcome: Progressing     Problem: DISCHARGE PLANNING  Goal: Discharge to home or other facility with appropriate resources  Description: INTERVENTIONS:  - Identify barriers to discharge w/patient and caregiver  - Arrange for needed discharge resources and transportation as appropriate  - Identify discharge learning needs (meds, wound care, etc.)  - Arrange for interpretive services to assist at discharge as needed  - Refer to Case Management Department for coordinating discharge planning if the patient needs post-hospital services based on physician/advanced practitioner order or complex needs related to functional status, cognitive ability, or social support system  Outcome: Progressing     Problem: Knowledge Deficit  Goal: Patient/family/caregiver demonstrates understanding of disease process, treatment plan, medications, and discharge instructions  Description: Complete learning assessment and assess knowledge base.   Interventions:  - Provide teaching at level of understanding  - Provide teaching via preferred learning methods  Outcome: Progressing     Problem: HEMATOLOGIC - ADULT  Goal: Maintains hematologic stability  Description: INTERVENTIONS  - Assess for signs and symptoms of bleeding or hemorrhage  - Monitor labs  - Administer supportive blood products/factors as ordered and appropriate  Outcome: Progressing     Problem: Prexisting or High Potential for Compromised Skin Integrity  Goal: Skin integrity is maintained or improved  Description: INTERVENTIONS:  - Identify patients at risk for skin breakdown  - Assess and monitor skin integrity  - Assess and monitor nutrition and hydration status  - Monitor labs   - Assess for incontinence   - Turn and reposition patient  - Assist with mobility/ambulation  - Relieve pressure over bony prominences  - Avoid friction and shearing  - Provide appropriate hygiene as needed including keeping skin clean and dry  - Evaluate need for skin moisturizer/barrier cream  - Collaborate with interdisciplinary team   - Patient/family teaching  - Consider wound care consult   Outcome: Progressing

## 2023-11-17 NOTE — RESTORATIVE TECHNICIAN NOTE
Restorative Technician Note      Patient Name: Kassy Chandler Tech Visit Date: 11/17/23  Note Type: Bracing, Follow-up fitting  Patient Position Upon Consult: Supine  Activity Performed: Repositioned  Range of Motion: Right leg  Brace Applied: Globoped Shoe (Heel Unloading)  Additional Brace Ordered: No  Patient Position When Brace Applied: Supine  Bracing Recommendations: Recommendation (comment) (Wear while walking, and left shoe if possible to even out height difference)  Education Provided: Yes  Patient Position at End of Consult: Supine;  All needs within reach  Nurse Communication: Nurse aware of consult, application of brace

## 2023-11-17 NOTE — ASSESSMENT & PLAN NOTE
Lab Results   Component Value Date    HGBA1C 7.0 (H) 10/04/2023       Recent Labs     11/16/23  0722 11/16/23  1723 11/16/23  2154   POCGLU 162* 158* 150*       Blood Sugar Average: Last 72 hrs:  (P) [id number]. On oral hypoglycemic meds and long-acting Lantus insulin 15 units nightly as outpatient.      Plan:  Hold PTA oral meds while inpatient  Continue home Lantus 15 units nightly  SSI correction with Accu-Cheks 4 times daily with meals and at bedtime  Adjust insulin regimen as needed based on BG levels, goal 140-180  Level 2 CARB/CHO controlled diabetic diet  Hypoglycemia protocol

## 2023-11-17 NOTE — PROGRESS NOTES
[address] [facility]  Progress Note  Name: Judith Pink  MRN: [id number]  Unit/Bed#: ICU 10 I Date of Admission: 11/16/2023   Date of Service: 11/17/2023 I Hospital Day: 1    Assessment/Plan   Peripheral artery disease [facility])  Assessment & Plan  77 yo male w/ a hx of remote smoker, DM II, HTN, CKD III, CAD, HLD, PAD, AAA S/P EVAR, dCHF and frequent PVCs presented to [facility] on 11/16/23 for a scheduled RLE bypass. Pt has a hx of PAD w/ a non-healing R heel wound. Pt had a RLE angiogram which showed a long segment occlusion of SFA, AT and PT. Decision was made to bypass to the peroneal artery, which is the only remaining patent tibial vessel. Pt underwent R CFA to peroneal artery bypass w/ reversed cryovein on 11/16/23. Podiatry consulted for non-healing R heel wound. Wound was debrided at bedside w/ plan for local wound care. Urology consulted re: pt's inability to void. Pt has a hx of a robot-assisted radical prostatectomy in 2015 w/ artificial urinary sphincter placement in 2017. Gilbert placed. Per urology, gilbert should remain in place until at least tomorrow, or until pt is more mobile. Plan:  -RLE CLTI w/ tissue loss  -S/P R CFA to peroneal artery bypass w/ reversed cryovein on 11/16  -R calf and groin incisions w/ silver mepilex intact, no strike-through  -R bypass, DP/PT and peroneal signals  -Continue neurovascular checks  -VS remain stable; D/C ayaz  -Pt reports chronic B/L LE neuropathy and numbness remains unchanged  -Consult PT/OT  -Urology following for hx of artificial urinary sphincter and inability to void; input appreciated  -Continue gilbert, per urology recommendations  -Podiatry following for local wound care; input appreciated. No plans for surgical intervention at this time. -ASA, plavix, lipitor and trental for medical management of PAD  -Will discuss w/ Dr. Heber Beck Doctor      Subjective:  Pt seen for exam while sitting in his chair; NAD.  Pt reports difficulty getting OOB to chair this am secondary to generalized weakness. Pt admits to intermittent pain in RLE for which prn oxycodone and dilaudid are effective. Pt reports decreased sensation/numbness in B/L feet extending to mid calfs B/L is chronic and remains unchanged. Dyer remains intact secondary to urinary retention and hx of artificial sphincter; draining clear, yellow urine. Otherwise, pt denies any further complaints at this time. Vitals:  /59   Pulse 66   Temp 98.1 °F (36.7 °C) (Oral)   Resp 15   Ht 6' 2" (1.88 m)   Wt 114 kg (251 lb 12.3 oz)   SpO2 90%   BMI 32.32 kg/m²     I/Os:  I/O last 3 completed shifts:   In: 7433.3 [I.V.:5933.3; IV Piggyback:1500]  Out: 1500 [Urine:750; Blood:750]  I/O this shift:  In: 1402.1 [I.V.:1402.1]  Out: 260 [Urine:260]    Lab Results and Cultures:   Lab Results   Component Value Date    WBC 9.02 11/17/2023    HGB 8.0 (L) 11/17/2023    HCT 25.7 (L) 11/17/2023    MCV 89 11/17/2023     11/17/2023     Lab Results   Component Value Date    GLUCOSE 159 (H) 11/16/2023    CALCIUM 7.4 (L) 11/17/2023    K 3.8 11/17/2023    CO2 24 11/17/2023     (H) 11/17/2023    BUN 29 (H) 11/17/2023    CREATININE 1.49 (H) 11/17/2023     Lab Results   Component Value Date    INR 1.15 11/16/2023    INR 1.10 10/24/2023    INR 1.26 (H) 06/17/2023    PROTIME 14.9 (H) 11/16/2023    PROTIME 14.1 10/24/2023    PROTIME 15.9 (H) 06/17/2023         Medications:  Current Facility-Administered Medications   Medication Dose Route Frequency    amLODIPine (NORVASC) tablet 5 mg  5 mg Oral Daily    aspirin chewable tablet 81 mg  81 mg Oral Daily    atorvastatin (LIPITOR) tablet 40 mg  40 mg Oral QPM    chlorhexidine (PERIDEX) 0.12 % oral rinse 15 mL  15 mL Mouth/Throat Q12H [facility]    cloNIDine (CATAPRES) tablet 0.1 mg  0.1 mg Oral Q12H    clopidogrel (PLAVIX) tablet 75 mg  75 mg Oral Daily    fenofibrate (TRICOR) tablet 145 mg  145 mg Oral Daily    gabapentin (NEURONTIN) capsule 600 mg  600 mg Oral BID heparin (porcine) subcutaneous injection 5,000 Units  5,000 Units Subcutaneous Q8H [facility]    HYDROmorphone (DILAUDID) injection 0.5 mg  0.5 mg Intravenous Q3H PRN    insulin glargine (LANTUS) subcutaneous injection 15 Units 0.15 mL  15 Units Subcutaneous HS    insulin lispro (HumaLOG) 100 units/mL subcutaneous injection 1-6 Units  1-6 Units Subcutaneous 4x Daily (AC & HS)    isosorbide mononitrate (IMDUR) 24 hr tablet 60 mg  60 mg Oral Daily    lactated ringers bolus 500 mL  500 mL Intravenous Once PRN    And    lactated ringers bolus 500 mL  500 mL Intravenous Once PRN    metoprolol tartrate (LOPRESSOR) tablet 50 mg  50 mg Oral Q12H CONSTANTINE    ondansetron (ZOFRAN) injection 4 mg  4 mg Intravenous Q4H PRN    oxyCODONE (ROXICODONE) immediate release tablet 10 mg  10 mg Oral Q6H PRN    oxyCODONE (ROXICODONE) IR tablet 5 mg  5 mg Oral Q6H PRN    pentoxifylline (TRENtal) ER tablet 400 mg  400 mg Oral TID With Meals    ranolazine (RANEXA) 12 hr tablet 500 mg  500 mg Oral BID    sodium chloride 0.9 % bolus 500 mL  500 mL Intravenous Once PRN    And    sodium chloride 0.9 % bolus 500 mL  500 mL Intravenous Once PRN         Physical Exam:    General appearance: alert and oriented, in no acute distress  Skin:  skin color, texture, turgor normal; no rashes  Neurologic: Grossly normal  Head: Normocephalic, without obvious abnormality, atraumatic  Lungs: clear to auscultation bilaterally  Heart:  rate regular; SR w/ multiple PVCs on monitor  Abdomen: soft, non-tender; bowel sounds normal; no masses,  no organomegaly  Extremities:  extremities normal, warm; no clubbing or cyanosis ; mild RLE edema    Wound/Incision:  R groin and R medial calf incisions w/ silver mepilex dry and intact; no strike-through. R plantar heel wound w/ drsg dry and intact.     Pulse exam:  Peroneal: Right: doppler signal  DP: Right: doppler signal Left: doppler signal  PT: Right: doppler signal Left: non-palpable      APOLLO Ribera  11/17/2023

## 2023-11-17 NOTE — PROGRESS NOTES
Progress Note - Urology  Ana Jean 1945, 78 y.o. male MRN: [id number]    Unit/Bed#: ICU 10 Encounter: [id number]    Postoperative urinary retention  Stress urinary incontinence status post artificial urinary sphincter 2017  Urinary retention likely related to anesthesia and postoperative state  Patients artificial sphincter was deactivated yesterday by Dr. Musa Castro and a 15 Fr gilbert catheter inserted. Urethral gilbert catheter draining clear yellow urine to gravity today. Plan:  Due to post-operative pain and generalized weakness this morning, we will plan to keep his gilbert catheter in place 1 additional day. PT/OT to be consulted. If patient continues to do well throughout the day and is able to ambulate better and regains strength, we can plan for void trial tomorrow AM.   Nursing can remove gilbert catheter in AM. Patient was instructed to leave his sphincter deactivated which will likely result in gross incontinence of urine. He can either use incontinence pads or a condom catheter to manage this. Following gilbert catheter removal, nursing will perform serial PVRs every 2 hours. Urology will reassess him in the afternoon, Once he is adequately emptying his bladder the sphincter can be reactivated by simple squeezing the pump. Subjective: patient is doing well this morning following his vascular surgery yesterday. He did have some difficulty transitioning from the bed to the bedside chair this morning due to right leg pain and generalized weakness. Otherwise he feels he is doing well. The urethral gilbert catheter is draining clear yellow urine to gravity. Review of Systems   Constitutional:  Negative for chills and fever. HENT:  Negative for congestion and sore throat. Respiratory:  Negative for cough and shortness of breath. Cardiovascular:  Negative for chest pain and leg swelling. Gastrointestinal:  Negative for abdominal pain, constipation and diarrhea.    Genitourinary: Negative for difficulty urinating, dysuria, frequency, hematuria and urgency. Musculoskeletal:  Negative for back pain and gait problem. Right leg pain   Skin:  Negative for wound. Allergic/Immunologic: Negative for immunocompromised state. Hematological:  Does not bruise/bleed easily. Objective:  Nursing Rounds:   Vitals: Blood pressure (!) 116/48, pulse 68, temperature 98.1 °F (36.7 °C), temperature source Oral, resp. rate 18, height 6' 2" (1.88 m), weight 114 kg (251 lb 12.3 oz), SpO2 92 %. ,Body mass index is 32.32 kg/m². Physical Exam  Vitals reviewed. Constitutional:       General: He is not in acute distress. Appearance: Normal appearance. He is not ill-appearing or toxic-appearing. HENT:      Head: Normocephalic and atraumatic. Eyes:      General: No scleral icterus. Conjunctiva/sclera: Conjunctivae normal.   Cardiovascular:      Rate and Rhythm: Normal rate and regular rhythm. Pulses: Normal pulses. Heart sounds: Normal heart sounds. Pulmonary:      Effort: Pulmonary effort is normal. No respiratory distress. Breath sounds: Normal breath sounds. Abdominal:      Tenderness: There is no right CVA tenderness or left CVA tenderness. Hernia: No hernia is present. Genitourinary:     Comments: Urethral gilbert catheter draining clear yellow urine to gravity. Normal phallus, testicle descended bilaterally. Artificial urinary sphincter pump palpable in the right scrotum. Musculoskeletal:      Cervical back: Normal range of motion. Right lower leg: No edema. Left lower leg: No edema. Skin:     General: Skin is warm and dry. Coloration: Skin is not jaundiced or pale. Neurological:      General: No focal deficit present. Mental Status: He is alert and oriented to person, place, and time. Mental status is at baseline.       Gait: Gait normal.   Psychiatric:         Mood and Affect: Mood normal.         Behavior: Behavior normal. Thought Content: Thought content normal.         Judgment: Judgment normal.         Imaging:      Labs:  Recent Labs     23  1518 23  0537   WBC 13.56* 9.02       Recent Labs     23  1251 23  1518 23  0537   HGB 10.2* 9.1* 8.0*     Recent Labs     23  1251 23  1518 23  0537   HCT 30* 28.4* 25.7*     Recent Labs     23  1518 23  0537   CREATININE 1.57* 1.49*         History:    Past Medical History:   Diagnosis Date    CAD (coronary artery disease)     Cancer (HCC)     prostate    CHF (congestive heart failure) (HCC)     Chronic kidney disease     Diabetes mellitus ([address])     Hyperlipidemia     Hypertension     Myocardial infarction ([address])     Neuropathy     Bilateral feet    Sleep apnea     Could not tolerate CPAP     Social History     Socioeconomic History    Marital status:      Spouse name: None    Number of children: None    Years of education: None    Highest education level: None   Occupational History    None   Tobacco Use    Smoking status: Former     Packs/day: 1.50     Years: 33.00     Total pack years: 49.50     Types: Cigarettes     Start date:      Quit date:      Years since quittin.9    Smokeless tobacco: Never   Vaping Use    Vaping Use: Never used   Substance and Sexual Activity    Alcohol use: Yes     Alcohol/week: 14.0 standard drinks of alcohol     Types: 14 Cans of beer per week     Comment: 1 -2 daily    Drug use: Never    Sexual activity: None   Other Topics Concern    None   Social History Narrative    None     Social Determinants of Health     Financial Resource Strain: Low Risk  (10/31/2023)    Overall Financial Resource Strain (CARDIA)     Difficulty of Paying Living Expenses: Not hard at all   Food Insecurity: Not on file   Transportation Needs: No Transportation Needs (10/31/2023)    PRAPARE - Transportation     Lack of Transportation (Medical): No     Lack of Transportation (Non-Medical):  No   Physical Activity: Not on file   Stress: Not on file   Social Connections: Not on file   Intimate Partner Violence: Not on file   Housing Stability: Not on file     Past Surgical History:   Procedure Laterality Date    ADENOIDECTOMY      CARDIAC CATHETERIZATION Left 10/19/2022    Procedure: Cardiac Left Heart Cath;  Surgeon: Anu Mcdonald MD;  Location: [facility];   Service: Cardiology    CARDIAC SURGERY  2002    3 cardiac bypass then angioplasty 7/2020    CHOLECYSTECTOMY      COLONOSCOPY      CORONARY ARTERY BYPASS GRAFT      IR LOWER EXTREMITY ANGIOGRAM  11/1/2023    [place] BYPASS W/VEIN FEMORAL-POPLITEAL Right 11/16/2023    Procedure: BYPASS FEMORAL-POPLITEAL WITH CRYO VEIN, RIGHT FEMORAL ENDARTERECTOMY;  Surgeon: Brandee Fuentes MD;  Location: [facility];  Service: Vascular    [place] Nilsa Rosa 3RD+ ORD SLCTV ABDL PEL/LXTR [facility] Right 11/1/2023    Procedure: ARTERIOGRAM Right lower extremity arteriogram with CO2 via right groin access;  Surgeon: Brandee Fuentes MD;  Location: [facility];  Service: Vascular    PROSTATE SURGERY      TONSILLECTOMY       Family History   Problem Relation Age of Onset    Diabetes Mother     Alcohol abuse Father     Mental illness Neg Hx        APOLLO George  Date: 11/17/2023 Time: 10:18 AM

## 2023-11-17 NOTE — ASSESSMENT & PLAN NOTE
Wt Readings from Last 3 Encounters:   11/16/23 114 kg (251 lb 15.8 oz)   11/01/23 108 kg (239 lb)   10/31/23 108 kg (239 lb)       Intake/Output Summary (Last 24 hours) at 11/16/2023 2100  Last data filed at 11/16/2023 2000  Gross per 24 hour   Intake 6433.33 ml   Output 900 ml   Net 5533.33 ml     No evidence of acute exacerbation. Maintained on torsemide as outpatient.     Plan:  Monitor UOP and volume status  Home torsemide on hold as per primary team

## 2023-11-17 NOTE — ASSESSMENT & PLAN NOTE
Lab Results   Component Value Date    HGBA1C 7.0 (H) 10/04/2023       Recent Labs     11/16/23  0722 11/16/23  1723 11/16/23  2154   POCGLU 162* 158* 150*       Blood Sugar Average: Last 72 hrs:  (P) [id number]. POA: Right lower extremity CLTI with chronic nonhealing foot ulcer status post right femoral popliteal bypass with pulses detected by Doppler and hemodynamically stable with BP within goal range.      Plan:  Close hemodynamic and neurovascular monitoring  Continue IVF with Isolyte at 125 mL/h   As needed IVF bolus ordered by primary team to maintain -160  Tylenol as needed for mild pain  Optimize BG control to facilitate wound healing  Podiatry consulted by primary team - appreciate input

## 2023-11-17 NOTE — PROGRESS NOTES
Vascular Nurse Navigator Post Op Education    Met with patient at bedside to introduce myself as Vascular Nurse Navigator and explained my role. Patient is appropriate and accepting to education. Patient was educated with Review of written materials provided, Teachback, Explanation, Demonstration, and Question & Answer on expectations of post op care and recovery on Right common femoral artery to peroneal artery bypass with reversed CryoVein conduit. Patient is a former smoker, quit 35 years ago, as such Smoking effects on the lungs, tobacco triggers, and Smoking cessation was reviewed. Education provided to patient on infection prevention, activity limitations, when to call the office, importance of follow up, and incisional care. Discharge instruction handout provided to patient to review. Provided patient with a pack of disposable washcloths, a pack of guaze, and a bottle of chlorhexidine for incision care upon discharge.

## 2023-11-17 NOTE — ASSESSMENT & PLAN NOTE
Lab Results   Component Value Date    HGBA1C 7.0 (H) 10/04/2023     POA: Right lower extremity CLTI with chronic nonhealing foot ulcer status post right femoral popliteal bypass with pulses detected by Doppler and hemodynamically stable with BP within goal range.     Plan:  Close hemodynamic and neurovascular monitoring  Continue IVF with Isolyte at 125 mL/h   As needed IVF bolus ordered by primary team to maintain -160  Tylenol as needed for mild pain  Optimize BG control to facilitate wound healing  Podiatry consulted by primary team - appreciate input  Possible discharge today 11/17/2023 per primary team

## 2023-11-17 NOTE — PLAN OF CARE
Problem: PAIN - ADULT  Goal: Verbalizes/displays adequate comfort level or baseline comfort level  Description: Interventions:  - Encourage patient to monitor pain and request assistance  - Assess pain using appropriate pain scale  - Administer analgesics based on type and severity of pain and evaluate response  - Implement non-pharmacological measures as appropriate and evaluate response  - Consider cultural and social influences on pain and pain management  - Notify physician/advanced practitioner if interventions unsuccessful or patient reports new pain  Outcome: Progressing     Problem: INFECTION - ADULT  Goal: Absence or prevention of progression during hospitalization  Description: INTERVENTIONS:  - Assess and monitor for signs and symptoms of infection  - Monitor lab/diagnostic results  - Monitor all insertion sites, i.e. indwelling lines, tubes, and drains  - Monitor endotracheal if appropriate and nasal secretions for changes in amount and color  - [place] appropriate cooling/warming therapies per order  - Administer medications as ordered  - Instruct and encourage patient and family to use good hand hygiene technique  - Identify and instruct in appropriate isolation precautions for identified infection/condition  Outcome: Progressing  Goal: Absence of fever/infection during neutropenic period  Description: INTERVENTIONS:  - Monitor WBC    Outcome: Progressing     Problem: SAFETY ADULT  Goal: Patient will remain free of falls  Description: INTERVENTIONS:  - Educate patient/family on patient safety including physical limitations  - Instruct patient to call for assistance with activity   - Consult OT/PT to assist with strengthening/mobility   - Keep Call bell within reach  - Keep bed low and locked with side rails adjusted as appropriate  - Keep care items and personal belongings within reach  - Initiate and maintain comfort rounds  - Make Fall Risk Sign visible to staff  - Offer Toileting every 2 Hours, in advance of need  - Initiate/Maintain bed alarm  - Obtain necessary fall risk management equipment  - Apply yellow socks and bracelet for high fall risk patients  - Consider moving patient to room near nurses station  Outcome: Progressing  Goal: Maintain or return to baseline ADL function  Description: INTERVENTIONS:  -  Assess patient's ability to carry out ADLs; assess patient's baseline for ADL function and identify physical deficits which impact ability to perform ADLs (bathing, care of mouth/teeth, toileting, grooming, dressing, etc.)  - Assess/evaluate cause of self-care deficits   - Assess range of motion  - Assess patient's mobility; develop plan if impaired  - Assess patient's need for assistive devices and provide as appropriate  - Encourage maximum independence but intervene and supervise when necessary  - Involve family in performance of ADLs  - Assess for home care needs following discharge   - Consider OT consult to assist with ADL evaluation and planning for discharge  - Provide patient education as appropriate  Outcome: Progressing  Goal: Maintains/Returns to pre admission functional level  Description: INTERVENTIONS:  - Perform AM-PAC 6 Click Basic Mobility/ Daily Activity assessment daily.  - Set and communicate daily mobility goal to care team and patient/family/caregiver. - Collaborate with rehabilitation services on mobility goals if consulted  - Perform Range of Motion 3 times a day. - Reposition patient every 2 hours.   - Dangle patient 3 times a day  - Stand patient 3 times a day  - Ambulate patient 3 times a day  - Out of bed to chair 3 times a day   - Out of bed for meals 3 times a day  - Out of bed for toileting  - Record patient progress and toleration of activity level   Outcome: Progressing     Problem: DISCHARGE PLANNING  Goal: Discharge to home or other facility with appropriate resources  Description: INTERVENTIONS:  - Identify barriers to discharge w/patient and caregiver  - Arrange for needed discharge resources and transportation as appropriate  - Identify discharge learning needs (meds, wound care, etc.)  - Arrange for interpretive services to assist at discharge as needed  - Refer to Case Management Department for coordinating discharge planning if the patient needs post-hospital services based on physician/advanced practitioner order or complex needs related to functional status, cognitive ability, or social support system  Outcome: Progressing     Problem: Knowledge Deficit  Goal: Patient/family/caregiver demonstrates understanding of disease process, treatment plan, medications, and discharge instructions  Description: Complete learning assessment and assess knowledge base.   Interventions:  - Provide teaching at level of understanding  - Provide teaching via preferred learning methods  Outcome: Progressing     Problem: HEMATOLOGIC - ADULT  Goal: Maintains hematologic stability  Description: INTERVENTIONS  - Assess for signs and symptoms of bleeding or hemorrhage  - Monitor labs  - Administer supportive blood products/factors as ordered and appropriate  Outcome: Progressing     Problem: Prexisting or High Potential for Compromised Skin Integrity  Goal: Skin integrity is maintained or improved  Description: INTERVENTIONS:  - Identify patients at risk for skin breakdown  - Assess and monitor skin integrity  - Assess and monitor nutrition and hydration status  - Monitor labs   - Assess for incontinence   - Turn and reposition patient  - Assist with mobility/ambulation  - Relieve pressure over bony prominences  - Avoid friction and shearing  - Provide appropriate hygiene as needed including keeping skin clean and dry  - Evaluate need for skin moisturizer/barrier cream  - Collaborate with interdisciplinary team   - Patient/family teaching  - Consider wound care consult   Outcome: Progressing

## 2023-11-17 NOTE — CONSULTS
[facility] Podiatry - Consultation    Patient Information:   Kirti Robles 78 y.o. male MRN: [id number]  Unit/Bed#: ICU 10 Encounter: [id number]  PCP: Shanel Leon DO  Date of Admission:  11/16/2023  Date of Consultation: 11/17/23  Requesting Physician: Davonte Medina*      ASSESSMENT:    Kirti Robles is a 78 y.o. male with:    Right plantar heel wound  T2DM w/peripheral neuropathy  CKD  PAD  -s/p R CFA to peroneal artery bypass (DOS: 11/16/23)    PLAN:    Right heel wound debrided at bedside. No clinical sign of acute infection, no plan for further surgical intervention. Local wound care consisting of betadine, DSD, Allevyn. Reviewed right heel/ankle MRI from 10/26/23. No sign of osteomyelitis at that time. No plan for further imaging this admission. Offloading of plantar heel wound is critical. Weightbearing as tolerated wearing heel offloading shoe. If he struggles with PT in offloading shoe, then toe touch weight bearing without the shoe may be considered. Outpatient podiatry follow up. Rest of care per primary team.  Will discuss this plan with my attending and update as needed. Weight Bearing Status: Weight bearing as tolerated wearing heel offloading shoe on the right    Wound debridement note: After verbal consent was obtained, wound located at right plantar heel was excisionally debrided with a 10 blade of all nonviable, devitalized, hyperkeratotic, fibrotic tissue w/ excision of skin and subcutaneous tissue to depth of subcutaneous tissue. Post debridement wound measurements 1.5x1.5x0.2cm, with appearance of wound fresh bleeding tissue with viable 100% vs nonviable 0%, <3sq cm debrided. SUBJECTIVE    History of Present Illness:    Kirti Robles is a 78 y.o. male with past medical history of T2DM, CKD, PAD, CAD, HTN. He underwent a right SFA to peroneal artery bypass yesterday with Vascular to optimize healing of a right plantar heel wound.  He suffered a puncture wound from stepping on a nail in the heel about 8 months ago. Since then the wound has failed to heal. He reports he has been walking on the foot in normal shoes, but wears cushioned socks. He has been busy and very active on the foot moving because he is moving into his daughter's home. He denies nausea, vomiting, chills, shortness of breath, chest pains. .     Review of Systems:    Constitutional: Negative. HENT: Negative. Eyes: Negative. Respiratory: Negative. Cardiovascular: Negative. Gastrointestinal: Negative. Musculoskeletal: RLE pain s/p bypass surgery   Skin:right heel wound  Neurological: peripheral neuropathy   Psych: Negative. Past Medical and Surgical History:     Past Medical History:   Diagnosis Date    CAD (coronary artery disease)     Cancer ([address])     prostate    CHF (congestive heart failure) ([facility])     Chronic kidney disease     Diabetes mellitus ([address])     Hyperlipidemia     Hypertension     Myocardial infarction ([address])     Neuropathy     Bilateral feet    Sleep apnea     Could not tolerate CPAP       Past Surgical History:   Procedure Laterality Date    ADENOIDECTOMY      CARDIAC CATHETERIZATION Left 10/19/2022    Procedure: Cardiac Left Heart Cath;  Surgeon: Cecile Ryder MD;  Location: [facility];   Service: Cardiology    CARDIAC SURGERY  2002    3 cardiac bypass then angioplasty 7/2020    CHOLECYSTECTOMY      COLONOSCOPY      CORONARY ARTERY BYPASS GRAFT      IR LOWER EXTREMITY ANGIOGRAM  11/1/2023    NH BYPASS W/VEIN FEMORAL-POPLITEAL Right 11/16/2023    Procedure: BYPASS FEMORAL-POPLITEAL WITH CRYO VEIN, RIGHT FEMORAL ENDARTERECTOMY;  Surgeon: Margarette Cuevas MD;  Location: [facility] OR;  Service: Vascular    NH Babita Prophet 3RD+ ORD Mati Castaneda PEL/TR [facility] Right 11/1/2023    Procedure: ARTERIOGRAM Right lower extremity arteriogram with CO2 via right groin access;  Surgeon: Margarette Cuevas MD;  Location: [facility];  Service: Vascular    PROSTATE SURGERY TONSILLECTOMY         Meds/Allergies:    Medications Prior to Admission   Medication    amLODIPine (NORVASC) 10 mg tablet    aspirin 81 mg chewable tablet    atorvastatin (LIPITOR) 40 mg tablet    cloNIDine (CATAPRES) 0.1 mg tablet    clopidogrel (PLAVIX) 75 mg tablet    fenofibrate 160 MG tablet    gabapentin (NEURONTIN) 600 MG tablet    glimepiride (AMARYL) 2 mg tablet    isosorbide mononitrate (IMDUR) 60 mg 24 hr tablet    Jardiance 25 MG TABS    Lantus SoloStar 100 units/mL SOPN    metFORMIN (GLUCOPHAGE) 500 mg tablet    metoprolol tartrate (LOPRESSOR) 50 mg tablet    Multiple Vitamins-Minerals (MULTIVITAMIN MEN 50+ PO)    nitroglycerin (NITROSTAT) 0.4 mg SL tablet    Omega-3 Fatty Acids (fish oil) 1,000 mg    pentoxifylline (TRENtal) 400 mg ER tablet    ranolazine (RANEXA) 500 mg 12 hr tablet    sitaGLIPtin (JANUVIA) 100 mg tablet    torsemide (DEMADEX) 20 mg tablet    Blood Glucose Monitoring Suppl (OneTouch Verio Reflect) w/Device KIT    Droplet Pen Needles 32G X 4 MM MISC    glucose blood (OneTouch Verio) test strip    OneTouch Delica Lancets 29K MISC       Allergies   Allergen Reactions    Lisinopril Rash and Lip Swelling       Social History:     Marital Status:      Substance Use History:   Social History     Substance and Sexual Activity   Alcohol Use Yes    Alcohol/week: 14.0 standard drinks of alcohol    Types: 14 Cans of beer per week    Comment: 1 -2 daily     Social History     Tobacco Use   Smoking Status Former    Packs/day: 1.50    Years: 33.00    Total pack years: 49.50    Types: Cigarettes    Start date:     Quit date:  Life    Years since quittin.9   Smokeless Tobacco Never     Social History     Substance and Sexual Activity   Drug Use Never       Family History:    Family History   Problem Relation Age of Onset    Diabetes Mother     Alcohol abuse Father     Mental illness Neg Hx          OBJECTIVE:    Vitals:   Blood Pressure: (!) 116/48 (23 0825)  Pulse: 68 (23 0825)  Temperature: 98.1 °F (36.7 °C) (11/17/23 0808)  Temp Source: Oral (11/17/23 5999)  Respirations: 18 (11/17/23 0825)  Height: 6' 2" (188 cm) (11/16/23 1716)  Weight - Scale: 114 kg (251 lb 12.3 oz) (11/17/23 0549)  SpO2: 92 % (11/17/23 0825)    Physical Exam:     General Appearance: Alert, cooperative, no distress. HEENT: Head normocephalic, atraumatic, without obvious abnormality. Heart: Normal rate and rhythm. Lungs: Non-labored breathing. No respiratory distress. Abdomen: Without distension. Psychiatric: AAOx3  Lower Extremity:  Vascular:    DP & PT signals present on doppler B/L. Capillary refill time <3 seconds B/L. Expected post-revascularization hyperemia with warmth and edema noted to the right foot. Musculoskeletal:  MMT is 5/5 in all muscle compartments bilaterally. Some lower extremity pain with gentle ankle motion related to recent bypass surgery. No gross deformity noted B/L. Dermatological:  No open lesions noted to the left foot. LE Wound Exam:   Wound #: 1  Location: right plantar heel  Length 1.5cm: Width 1.5cm: Depth 0.2cm:   Deepest Tissue Noted in Base: subcutaneous  Probe to Bone: No  Peripheral Skin Description: Attached, hyperkeratotic  Granulation: 90% Fibrotic Tissue: 10% Necrotic Tissue: 0%   Drainage Amount: minimal, bloody  Signs of Infection: No    Normal expected post-revascularization hyperemia with warmth, erythema, and edema to the right lower extremity. There is no purulence, no fluctuance, no crepitus noted from the wound. Neurological:  Gross sensation is diminished. Protective sensation is diminished. Patient Reports numbness and/or paresthesias.     Clinical Images 11/17/23:    R plantar heel pre debridement      R plantar heel post debridement    Additional Data:     Lab Results: I have personally reviewed pertinent labs including:    Results from last 7 days   Lab Units 11/17/23  0537   WBC Thousand/uL 9.02   HEMOGLOBIN g/dL 8.0*   HEMATOCRIT % 25.7* PLATELETS Thousands/uL 155   NEUTROS PCT % 71   LYMPHS PCT % 21   MONOS PCT % 7   EOS PCT % 1     Results from last 7 days   Lab Units 11/17/23  0537 11/16/23  1518 11/16/23  1251   POTASSIUM mmol/L 3.8   < >  --    CHLORIDE mmol/L 112*   < >  --    CO2 mmol/L 24   < >  --    CO2, I-STAT mmol/L  --   --  20*   BUN mg/dL 29*   < >  --    CREATININE mg/dL 1.49*   < >  --    CALCIUM mg/dL 7.4*   < >  --    GLUCOSE, ISTAT mg/dl  --   --  159*    < > = values in this interval not displayed. Results from last 7 days   Lab Units 11/16/23  0727   INR  1.15       Cultures: I have personally reviewed pertinent cultures including:              Imaging: I have personally reviewed pertinent films in PACS. EKG, Pathology, and Other Studies: I have personally reviewed pertinent reports. ** Please Note: Portions of the record may have been created with voice recognition software. Occasional wrong word or "sound a like" substitutions may have occurred due to the inherent limitations of voice recognition software. Read the chart carefully and recognize, using context, where substitutions have occurred.  **

## 2023-11-18 LAB
GLUCOSE SERPL-MCNC: 129 MG/DL (ref 65–140)
GLUCOSE SERPL-MCNC: 141 MG/DL (ref 65–140)
GLUCOSE SERPL-MCNC: 152 MG/DL (ref 65–140)
GLUCOSE SERPL-MCNC: 177 MG/DL (ref 65–140)

## 2023-11-18 PROCEDURE — 99024 POSTOP FOLLOW-UP VISIT: CPT

## 2023-11-18 PROCEDURE — 82948 REAGENT STRIP/BLOOD GLUCOSE: CPT

## 2023-11-18 PROCEDURE — 97167 OT EVAL HIGH COMPLEX 60 MIN: CPT

## 2023-11-18 PROCEDURE — 97163 PT EVAL HIGH COMPLEX 45 MIN: CPT

## 2023-11-18 PROCEDURE — 99232 SBSQ HOSP IP/OBS MODERATE 35: CPT | Performed by: NURSE PRACTITIONER

## 2023-11-18 RX ADMIN — ACETAMINOPHEN 325MG 650 MG: 325 TABLET ORAL at 05:41

## 2023-11-18 RX ADMIN — CLONIDINE HYDROCHLORIDE 0.1 MG: 0.1 TABLET ORAL at 12:26

## 2023-11-18 RX ADMIN — CHLORHEXIDINE GLUCONATE 15 ML: 1.2 RINSE ORAL at 08:28

## 2023-11-18 RX ADMIN — HEPARIN SODIUM 5000 UNITS: 5000 INJECTION INTRAVENOUS; SUBCUTANEOUS at 21:27

## 2023-11-18 RX ADMIN — FENOFIBRATE 145 MG: 145 TABLET, FILM COATED ORAL at 08:27

## 2023-11-18 RX ADMIN — GABAPENTIN 600 MG: 300 CAPSULE ORAL at 17:10

## 2023-11-18 RX ADMIN — ACETAMINOPHEN 325MG 650 MG: 325 TABLET ORAL at 23:41

## 2023-11-18 RX ADMIN — HEPARIN SODIUM 5000 UNITS: 5000 INJECTION INTRAVENOUS; SUBCUTANEOUS at 14:48

## 2023-11-18 RX ADMIN — METOPROLOL TARTRATE 50 MG: 50 TABLET, FILM COATED ORAL at 08:27

## 2023-11-18 RX ADMIN — OXYCODONE HYDROCHLORIDE 10 MG: 10 TABLET ORAL at 08:34

## 2023-11-18 RX ADMIN — ONDANSETRON 4 MG: 2 INJECTION INTRAMUSCULAR; INTRAVENOUS at 04:32

## 2023-11-18 RX ADMIN — ISOSORBIDE MONONITRATE 60 MG: 60 TABLET, EXTENDED RELEASE ORAL at 08:27

## 2023-11-18 RX ADMIN — ASPIRIN 81 MG CHEWABLE TABLET 81 MG: 81 TABLET CHEWABLE at 08:28

## 2023-11-18 RX ADMIN — RANOLAZINE 500 MG: 500 TABLET, EXTENDED RELEASE ORAL at 21:28

## 2023-11-18 RX ADMIN — CLOPIDOGREL BISULFATE 75 MG: 75 TABLET ORAL at 08:27

## 2023-11-18 RX ADMIN — CHLORHEXIDINE GLUCONATE 15 ML: 1.2 RINSE ORAL at 21:28

## 2023-11-18 RX ADMIN — AMLODIPINE BESYLATE 5 MG: 5 TABLET ORAL at 08:28

## 2023-11-18 RX ADMIN — CLONIDINE HYDROCHLORIDE 0.1 MG: 0.1 TABLET ORAL at 23:41

## 2023-11-18 RX ADMIN — ACETAMINOPHEN 325MG 650 MG: 325 TABLET ORAL at 12:22

## 2023-11-18 RX ADMIN — RANOLAZINE 500 MG: 500 TABLET, EXTENDED RELEASE ORAL at 08:28

## 2023-11-18 RX ADMIN — INSULIN GLARGINE 15 UNITS: 100 INJECTION, SOLUTION SUBCUTANEOUS at 21:27

## 2023-11-18 RX ADMIN — ACETAMINOPHEN 325MG 650 MG: 325 TABLET ORAL at 17:11

## 2023-11-18 RX ADMIN — GABAPENTIN 600 MG: 300 CAPSULE ORAL at 08:27

## 2023-11-18 RX ADMIN — INSULIN LISPRO 1 UNITS: 100 INJECTION, SOLUTION INTRAVENOUS; SUBCUTANEOUS at 21:27

## 2023-11-18 RX ADMIN — INSULIN LISPRO 1 UNITS: 100 INJECTION, SOLUTION INTRAVENOUS; SUBCUTANEOUS at 17:09

## 2023-11-18 RX ADMIN — METOPROLOL TARTRATE 50 MG: 50 TABLET, FILM COATED ORAL at 21:27

## 2023-11-18 RX ADMIN — ATORVASTATIN CALCIUM 40 MG: 40 TABLET, FILM COATED ORAL at 17:10

## 2023-11-18 RX ADMIN — HEPARIN SODIUM 5000 UNITS: 5000 INJECTION INTRAVENOUS; SUBCUTANEOUS at 05:41

## 2023-11-18 NOTE — PLAN OF CARE
Problem: OCCUPATIONAL THERAPY ADULT  Goal: Performs self-care activities at highest level of function for planned discharge setting. See evaluation for individualized goals. Description: Treatment Interventions: ADL retraining, Functional transfer training, UE strengthening/ROM, Endurance training, Patient/family training, Equipment evaluation/education, Compensatory technique education          See flowsheet documentation for full assessment, interventions and recommendations. Note: Limitation: Decreased ADL status, Decreased UE strength, Decreased Safe judgement during ADL, Decreased endurance, Decreased high-level ADLs  Prognosis: Good  Assessment: Pt is a 77y/o male admitted to the hospital for an elected s/p R CFA to peroneal artery bypass (DOS: 11/16/23) 2* non-healing R heel wound(puncture wound). Pt is currently allowed WBAT R LE with heel offloading shoe. Pt with PMH CKD, PAD, DM, CAD, MI, neuropathy, prostate Ca--s/p sx, CHF, HTN, CABG. PTA pt states independence with all aspects of his ADL, transfers, ambulation--w/o device; neg falls, +, +home alone. During initial eval, pt demonstrated deficits with his functional balance, functional mobility, ADL status, transfer safety, b/l UE strength, activity tolerance(currently fair=15-20mins). Pt would benefit from continued OT tx for the above deficits. 3-5xwk/1-2wks. The patient's raw score on the AM-PAC Daily Activity Inpatient Short Form is 17. A raw score of less than 19 suggests the patient may benefit from discharge to post-acute rehabilitation services. Please refer to the recommendation of the Occupational Therapist for safe discharge planning.      Rehab Resource Intensity Level, OT: I (Maximum Resource Intensity)

## 2023-11-18 NOTE — PROGRESS NOTES
Progress Note - Natacha Nguyen 78 y.o. male MRN: [id number]    Unit/Bed#: [facility] 2 [address] [address] [place] [address] [place] 218-02 Encounter: [id number]      Assessment:  Natacha Nguyen is an 78-year-old medically complex male with substantial cardiovascular history postoperative day 2 right CFA by vascular surgery for PAD and right lower extremity nonhealing wound. He has a history of prostate cancer, prostatectomy remotely with gross urinary incontinence recalcitrant to  multiple lines of treatment, status post artificial urinary sphincter remotely at [facility]. Currently deactivated with 14 Congolese Dyer catheter inserted. Patient is slowly improving. He is afebrile, hemodynamically stable and adequately pain controlled. He is beginning to get out of bed with assistance. But does not feel that she is optimal just yet. His hemoglobin is 8. Creatinine 1.49. No leukocytosis. Dyer patent for grossly clear yellow urine. He was followed by my colleague Jono Luo yesterday with plan for Dyer catheter removal this AM.    Plan:  15 Congolese Dyer catheter removed gently. No evidence of trauma. Patient has declined reactivation of his device at this time. He is not ready to manage his sphincter and does not want to risk damaging cuff during hospitalization or rehab if necessary. He is agreeable to condom catheter at this interval.  Close examination of genitalia reveals that device will conform. Discussed with primary nurse Brissa Arredondo at bedside to refrain from indiscriminate bladder scan. Patient should void passively and this is more than acceptable. Bladder scan only if patient experiences extremis. If there are any issues with urinary retention and potential requirement for catheter reinsertion, our service should be contacted first.  Patient wishes to establish care with [facility] and we will contact him with an appointment once discharged.   If patient demonstrates good voiding over the weekend and is still hospitalized by Monday can consider reactivation as long as patient has recuperated enough to manage his device. We will follow peripherally. Subjective: Denies fever, chills, flank, abdominal, suprapubic pain. Objective:     Vitals: Blood pressure 140/54, pulse 66, temperature 99.1 °F (37.3 °C), temperature source Oral, resp. rate 18, height 6' 2" (1.88 m), weight 116 kg (256 lb 13.4 oz), SpO2 93 %. ,Body mass index is 32.98 kg/m². Intake/Output Summary (Last 24 hours) at 11/18/2023 1214  Last data filed at 11/18/2023 0540  Gross per 24 hour   Intake --   Output 815 ml   Net -815 ml       Physical Exam: General appearance: alert and oriented, in no acute distress, appears stated age, cooperative, and no distress  Head: Normocephalic, without obvious abnormality, atraumatic  Neck: no JVD and supple, symmetrical, trachea midline  Lungs: clear to auscultation bilaterally  Heart: regular rate and rhythm, S1, S2 normal, no murmur, click, rub or gallop  Abdomen: soft, non-tender; bowel sounds normal; no masses,  no organomegaly  Extremities: Right lower extremity dressing  Pulses: 2+ and symmetric  Neurologic: Grossly normal  14 Portuguese Dyer--clear yellow urine. pre removal     Invasive Devices       Peripheral Intravenous Line  Duration             Peripheral IV 11/16/23 Dorsal (posterior); Left;Proximal Forearm 2 days    Peripheral IV 11/16/23 Right Forearm 2 days              Drain  Duration             Urethral Catheter Coude 14 Fr. 1 day                  Lab Results   Component Value Date    WBC 9.02 11/17/2023    HGB 8.0 (L) 11/17/2023    HCT 25.7 (L) 11/17/2023    MCV 89 11/17/2023     11/17/2023     Lab Results   Component Value Date    SODIUM 139 11/17/2023    K 3.8 11/17/2023     (H) 11/17/2023    CO2 24 11/17/2023    BUN 29 (H) 11/17/2023    CREATININE 1.49 (H) 11/17/2023    GLUC 112 11/17/2023    CALCIUM 7.4 (L) 11/17/2023       Lab, Imaging and other studies: I have personally reviewed pertinent reports.

## 2023-11-18 NOTE — PLAN OF CARE
Problem: PAIN - ADULT  Goal: Verbalizes/displays adequate comfort level or baseline comfort level  Description: Interventions:  - Encourage patient to monitor pain and request assistance  - Assess pain using appropriate pain scale  - Administer analgesics based on type and severity of pain and evaluate response  - Implement non-pharmacological measures as appropriate and evaluate response  - Consider cultural and social influences on pain and pain management  - Notify physician/advanced practitioner if interventions unsuccessful or patient reports new pain  Outcome: Progressing     Problem: INFECTION - ADULT  Goal: Absence or prevention of progression during hospitalization  Description: INTERVENTIONS:  - Assess and monitor for signs and symptoms of infection  - Monitor lab/diagnostic results  - Monitor all insertion sites, i.e. indwelling lines, tubes, and drains  - Monitor endotracheal if appropriate and nasal secretions for changes in amount and color  - Dudley appropriate cooling/warming therapies per order  - Administer medications as ordered  - Instruct and encourage patient and family to use good hand hygiene technique  - Identify and instruct in appropriate isolation precautions for identified infection/condition  Outcome: Progressing  Goal: Absence of fever/infection during neutropenic period  Description: INTERVENTIONS:  - Monitor WBC    Outcome: Progressing     Problem: SAFETY ADULT  Goal: Patient will remain free of falls  Description: INTERVENTIONS:  - Educate patient/family on patient safety including physical limitations  - Instruct patient to call for assistance with activity   - Consult OT/PT to assist with strengthening/mobility   - Keep Call bell within reach  - Keep bed low and locked with side rails adjusted as appropriate  - Keep care items and personal belongings within reach  - Initiate and maintain comfort rounds  - Make Fall Risk Sign visible to staff  - Offer Toileting every 2 Hours, in advance of need  - Initiate/Maintain bed alarm  - Obtain necessary fall risk management equipment:   - Apply yellow socks and bracelet for high fall risk patients  - Consider moving patient to room near nurses station  Outcome: Progressing  Goal: Maintain or return to baseline ADL function  Description: INTERVENTIONS:  -  Assess patient's ability to carry out ADLs; assess patient's baseline for ADL function and identify physical deficits which impact ability to perform ADLs (bathing, care of mouth/teeth, toileting, grooming, dressing, etc.)  - Assess/evaluate cause of self-care deficits   - Assess range of motion  - Assess patient's mobility; develop plan if impaired  - Assess patient's need for assistive devices and provide as appropriate  - Encourage maximum independence but intervene and supervise when necessary  - Involve family in performance of ADLs  - Assess for home care needs following discharge   - Consider OT consult to assist with ADL evaluation and planning for discharge  - Provide patient education as appropriate  Outcome: Progressing  Goal: Maintains/Returns to pre admission functional level  Description: INTERVENTIONS:  - Perform AM-PAC 6 Click Basic Mobility/ Daily Activity assessment daily.  - Set and communicate daily mobility goal to care team and patient/family/caregiver. - Collaborate with rehabilitation services on mobility goals if consulted  - Perform Range of Motion 8 times a day. - Reposition patient every 2 hours.   - Dangle patient 3 times a day  - Stand patient 3 times a day  - Ambulate patient 3 times a day  - Out of bed to chair 3 times a day   - Out of bed for meals 3 times a day  - Out of bed for toileting  - Record patient progress and toleration of activity level   Outcome: Progressing     Problem: DISCHARGE PLANNING  Goal: Discharge to home or other facility with appropriate resources  Description: INTERVENTIONS:  - Identify barriers to discharge w/patient and caregiver  - Arrange for needed discharge resources and transportation as appropriate  - Identify discharge learning needs (meds, wound care, etc.)  - Arrange for interpretive services to assist at discharge as needed  - Refer to Case Management Department for coordinating discharge planning if the patient needs post-hospital services based on physician/advanced practitioner order or complex needs related to functional status, cognitive ability, or social support system  Outcome: Progressing     Problem: Knowledge Deficit  Goal: Patient/family/caregiver demonstrates understanding of disease process, treatment plan, medications, and discharge instructions  Description: Complete learning assessment and assess knowledge base.   Interventions:  - Provide teaching at level of understanding  - Provide teaching via preferred learning methods  Outcome: Progressing     Problem: HEMATOLOGIC - ADULT  Goal: Maintains hematologic stability  Description: INTERVENTIONS  - Assess for signs and symptoms of bleeding or hemorrhage  - Monitor labs  - Administer supportive blood products/factors as ordered and appropriate  Outcome: Progressing     Problem: Prexisting or High Potential for Compromised Skin Integrity  Goal: Skin integrity is maintained or improved  Description: INTERVENTIONS:  - Identify patients at risk for skin breakdown  - Assess and monitor skin integrity  - Assess and monitor nutrition and hydration status  - Monitor labs   - Assess for incontinence   - Turn and reposition patient  - Assist with mobility/ambulation  - Relieve pressure over bony prominences  - Avoid friction and shearing  - Provide appropriate hygiene as needed including keeping skin clean and dry  - Evaluate need for skin moisturizer/barrier cream  - Collaborate with interdisciplinary team   - Patient/family teaching  - Consider wound care consult   Outcome: Progressing     Problem: GASTROINTESTINAL - ADULT  Goal: Maintains adequate nutritional intake  Description: INTERVENTIONS:  - Monitor percentage of each meal consumed  - Identify factors contributing to decreased intake, treat as appropriate  - Assist with meals as needed  - Monitor I&O, weight, and lab values if indicated  - Obtain nutrition services referral as needed  Outcome: Progressing     Problem: METABOLIC, FLUID AND ELECTROLYTES - ADULT  Goal: Glucose maintained within target range  Description: INTERVENTIONS:  - Monitor Blood Glucose as ordered  - Assess for signs and symptoms of hyperglycemia and hypoglycemia  - Administer ordered medications to maintain glucose within target range  - Assess nutritional intake and initiate nutrition service referral as needed  Outcome: Progressing     Problem: SKIN/TISSUE INTEGRITY - ADULT  Goal: Incision(s), wounds(s) or drain site(s) healing without S/S of infection  Description: INTERVENTIONS  - Assess and document dressing, incision, wound bed, drain sites and surrounding tissue  - Provide patient and family education  - Perform skin care/dressing changes  Outcome: Progressing     Problem: MUSCULOSKELETAL - ADULT  Goal: Maintain or return mobility to safest level of function  Description: INTERVENTIONS:  - Assess patient's ability to carry out ADLs; assess patient's baseline for ADL function and identify physical deficits which impact ability to perform ADLs (bathing, care of mouth/teeth, toileting, grooming, dressing, etc.)  - Assess/evaluate cause of self-care deficits   - Assess range of motion  - Assess patient's mobility  - Assess patient's need for assistive devices and provide as appropriate  - Encourage maximum independence but intervene and supervise when necessary  - Involve family in performance of ADLs  - Assess for home care needs following discharge   - Consider OT consult to assist with ADL evaluation and planning for discharge  - Provide patient education as appropriate  Outcome: Progressing

## 2023-11-18 NOTE — OCCUPATIONAL THERAPY NOTE
Occupational Therapy Evaluation     Patient Name: Graciela Mccormick  CJEJC'A Date: 11/18/2023  Problem List  Principal Problem:    Diabetic ulcer of right midfoot associated with diabetes mellitus due to underlying condition, limited to breakdown of skin ([address])  Active Problems:    Primary hypertension    Coronary artery disease involving native coronary artery of native heart without angina pectoris    Type 2 diabetes mellitus with diabetic polyneuropathy, with long-term current use of insulin (HCC)    Peripheral artery disease (HCC)    Stage 3b chronic kidney disease (HCC)    Chronic HFpEF/Moderate pHTN (HFpEF) (HCC)    Past Medical History  Past Medical History:   Diagnosis Date    CAD (coronary artery disease)     Cancer ([address])     prostate    CHF (congestive heart failure) (HCC)     Chronic kidney disease     Diabetes mellitus ([address])     Hyperlipidemia     Hypertension     Myocardial infarction ([address])     Neuropathy     Bilateral feet    Sleep apnea     Could not tolerate CPAP     Past Surgical History  Past Surgical History:   Procedure Laterality Date    ADENOIDECTOMY      CARDIAC CATHETERIZATION Left 10/19/2022    Procedure: Cardiac Left Heart Cath;  Surgeon: Rita Aiken MD;  Location: [facility];   Service: Cardiology    CARDIAC SURGERY  2002    3 cardiac bypass then angioplasty 7/2020    CHOLECYSTECTOMY      COLONOSCOPY      CORONARY ARTERY BYPASS GRAFT      IR LOWER EXTREMITY ANGIOGRAM  11/1/2023    WI BYPASS W/VEIN FEMORAL-POPLITEAL Right 11/16/2023    Procedure: BYPASS FEMORAL-POPLITEAL WITH CRYO VEIN, RIGHT FEMORAL ENDARTERECTOMY;  Surgeon: Brad Acevedo MD;  Location: [facility];  Service: Vascular    WI Anil Sun 3RD+ ORD [id number] [facility] Right 11/1/2023    Procedure: ARTERIOGRAM Right lower extremity arteriogram with CO2 via right groin access;  Surgeon: Brad Acevedo MD;  Location: [facility];  Service: Vascular    PROSTATE SURGERY      TONSILLECTOMY 11/18/23 1325   Note Type   Note type Evaluation   Pain Assessment   Pain Assessment Tool 0-10   Pain Score 4   Pain Location/Orientation Orientation: Right;Location: Leg   Pain Rating: FLACC (Rest) - Face 0   Pain Rating: FLACC (Rest) - Legs 0   Pain Rating: FLACC (Rest) - Activity 0   Pain Rating: FLACC (Rest) - Cry 0   Pain Rating: FLACC (Rest) - Consolability 0   Score: FLACC (Rest) 0   Restrictions/Precautions   Weight Bearing Precautions Per Order Yes   RLE Weight Bearing Per Order WBAT   Braces or Orthoses Other (Comment)  (heel unloading shoe)   Other Precautions Fall Risk;Pain; Chair Alarm; Bed Alarm;WBS   Home Living   Type of [address] [place] [address] Dr Two level;Bed/bath upstairs   Bathroom Shower/Tub Walk-in shower   Bathroom Toilet Raised   Home Equipment Walker   Prior Function   Lives With Daughter   Falls in the last 6 months 0   Lifestyle   Autonomy PTA pt states independence with all aspects of his ADL, transfers, ambulation--w/o device; neg falls, +, +home alone   Reciprocal Relationships dtr, supportive family   Service to Others worked as a  for a [employer]   Subjective   Subjective "I didn't even feel the odalys when it went into my foot."   ADL   Where Assessed Edge of bed   Eating Assistance 6  Modified independent   Grooming Assistance 6  Modified Independent   UB Bathing Assistance 5  Supervision/Setup   LB Bathing Assistance 3  Moderate Assistance   UB Dressing Assistance 5  Supervision/Setup   LB Dressing Assistance 3  Moderate [address]  4  Minimal Assistance   Bed Mobility   Rolling R 4  Minimal assistance   Additional items Assist x 1; Increased time required;Verbal cues;LE management   Rolling L 4  Minimal assistance   Additional items Assist x 1; Increased time required;Verbal cues;LE management   Sit to Supine 3  Moderate assistance   Additional items Assist x 2; Increased time required;LE management;Verbal cues   Transfers   Sit to Stand 2  Maximal assistance   Additional items Assist x 2; Increased time required;Verbal cues   Stand to Sit 3  Moderate assistance   Additional items Assist x 1; Increased time required;Verbal cues   Functional Mobility   Functional Mobility 3  Moderate assistance   Additional Comments x1   Additional items Rolling walker   Balance   Static Sitting Fair   Dynamic Sitting Fair -   Static Standing Poor +   Dynamic Standing Poor   Activity Tolerance   Activity Tolerance Patient limited by fatigue;Patient limited by pain   Medical Staff Made Aware nsg, P.T.   RUE Assessment   RUE Assessment WFL   RUE Strength   RUE Overall Strength Within Functional Limits - able to perform ADL tasks with strength  (4/5 throughout)   LUE Assessment   LUE Assessment WFL   LUE Strength   LUE Overall Strength Within Functional Limits - able to perform ADL tasks with strength  (4/5 throughout)   Edema   LUE Edema   (L hand, moderate edema noted--? old IV site)   Hand Function   Gross Motor Coordination Functional   Fine Motor Coordination Functional   Sensation   Light Touch No apparent deficits   Proprioception   Proprioception No apparent deficits   Vision-Basic Assessment   Current Vision   (glasses)   Vision - Complex Assessment   Acuity Able to read clock/calendar on wall without difficulty   Psychosocial   Psychosocial (WDL) X   Patient Behaviors/Mood Flat affect; Cooperative   Perception   Inattention/Neglect Appears intact   Cognition   Overall Cognitive Status WFL   Arousal/Participation Alert   Attention Within functional limits   Orientation Level Oriented X4   Memory Within functional limits   Following Commands Follows one step commands without difficulty   Assessment   Limitation Decreased ADL status; Decreased UE strength;Decreased Safe judgement during ADL;Decreased endurance;Decreased high-level ADLs   Prognosis Good   Assessment Pt is a 77y/o male admitted to the hospital for an elected s/p R CFA to peroneal artery bypass (DOS: 11/16/23) 2* non-healing R heel wound(puncture wound). Pt is currently allowed WBAT R LE with heel offloading shoe. Pt with PMH CKD, PAD, DM, CAD, MI, neuropathy, prostate Ca--s/p sx, CHF, HTN, CABG. PTA pt states independence with all aspects of his ADL, transfers, ambulation--w/o device; neg falls, +, +home alone. During initial eval, pt demonstrated deficits with his functional balance, functional mobility, ADL status, transfer safety, b/l UE strength, activity tolerance(currently fair=15-20mins). Pt would benefit from continued OT tx for the above deficits. 3-5xwk/1-2wks. The patient's raw score on the -PAC Daily Activity Inpatient Short Form is 17. A raw score of less than 19 suggests the patient may benefit from discharge to post-acute rehabilitation services. Please refer to the recommendation of the Occupational Therapist for safe discharge planning. Goals   Patient Goals "to get better"   Short Term Goal #1 Pt will demonstrate improved activity tolerance to good(20-30mins) and standing tolerance to 3-5mins to assist with ADLs. Short Term Goal #2 Pt will demonstrate proper walker/transfer safety 100% of the time. Short Term Goal  Pt will demonstrate mod I with their sit-stand transfers to assist with completion of their LE dressing. LTG Time Frame   (7-14 days)   Long Term Goal #1 Pt will demonstrate g/g- balance with all functional activities. Long Term Goal #2 Pt will demonstrate mod I with their UE and LE bathing/dresssing. Long Term Goal Pt will independently verbalize 2-3 potential fall risks/transfer safety hazards and their appropriate compensation techniques. Plan   Treatment Interventions ADL retraining;Functional transfer training;UE strengthening/ROM; Endurance training;Patient/family training;Equipment evaluation/education; Compensatory technique education   Goal Expiration Date 12/02/23   OT Treatment Day 0   OT Frequency 3-5x/wk   Discharge Recommendation   Rehab Resource Intensity Level, OT I (Maximum Resource Intensity)   AM-PAC Daily Activity Inpatient   Lower Body Dressing 2   Bathing 2   Toileting 2   Upper Body Dressing 3   Grooming 4   Eating 4   Daily Activity Raw Score 17   Daily Activity Standardized Score (Calc for Raw Score >=11) 37.26   Luis Amador

## 2023-11-18 NOTE — PROGRESS NOTES
[facility]  Progress Note  Name: Wen Cordero  MRN: [id number]  Unit/Bed#: [facility] 2 -02 I Date of Admission: 2023   Date of Service: 2023 I Hospital Day: 2    Assessment/Plan   Peripheral artery disease [facility])  Assessment & Plan  79 yo male w/ a hx of remote smoker, DM II, HTN, CKD III, CAD, HLD, PAD, AAA S/P EVAR, dCHF and frequent PVCs presented to [facility] on 23 for a elective RLE bypass for a non-healing R heel wound. Marci Chambers POD #2 S/p R CFA to peroneal artery bypass with [facility]    Exam: VSS on RA. Afebrile. R Leg dressings taken down at bedside today. Right calf and groin incisions c/d/I without erythema, drainage, dehiscence, hematoma or pulsatile mass (Clinical photos below). Tenderness to palpation to R calf region. 1+ edema in right leg from calf to foot. RLE bypass appears clinically patent with audible DP/PT and peroneal signals. Motor and sensation grossly intact. 2+ R fem pulse. Plan:  -RLE CLTI w/ right heel tissue loss  -S/P R CFA to peroneal artery bypass w/ reversed cryovein on  by Dr. Debra Osuna. POD2.   -Continue neurovascular checks  -Continue local wound care to surgical incisions. R calf cleansed with NSS and covered with non-adherent dressing and kerlix. -OOB   -Consult PT/OT (P)  -Urology following for hx of prostatectomy '15 and artificial urinary sphincter placed in '17 and inability to void; input appreciated. Continue gilbert with plan for void trial today if patient is progressing well and ambulating, per urology recommendations  -Podiatry following for local wound care; input appreciated. Bedside debridement performed. No plans for surgical intervention at this time.  -Continue ASA, plavix, lipitor and trental for medical management of PAD  -Dispo planning.  Awaiting PT/OT recommendations, pain control.   -Outpatient follow up with vascular arranged for  with Dr. Debra Osuna.   -Will discuss w/ Dr. Carlee Guerin. Subjective: Patient laying in bed in NAD. He reports intermittent pain to the right leg. He rates his pain 6/10 and just received a dose of pain medication. He reports decreased range of motion and has only ambulating several steps and reports pain in the right heel region. He denies fever or chills. Vitals:  /54 (BP Location: Right arm)   Pulse 66   Temp 99.1 °F (37.3 °C) (Oral)   Resp 18   Ht 6' 2" (1.88 m)   Wt 116 kg (256 lb 13.4 oz)   SpO2 93%   BMI 32.98 kg/m²     I/Os:  I/O last 3 completed shifts: In: 2735.4 [I.V.:1735.4; IV [id number]]  Out: 2075 [Urine:2075]  No intake/output data recorded. Lab Results and Cultures:   CBC with diff:   Lab Results   Component Value Date    WBC 9.02 11/17/2023    HGB 8.0 (L) 11/17/2023    HCT 25.7 (L) 11/17/2023    MCV 89 11/17/2023     11/17/2023    RBC 2.89 (L) 11/17/2023    MCH 27.7 11/17/2023    MCHC 31.1 (L) 11/17/2023    RDW 13.8 11/17/2023    MPV 11.0 11/17/2023    NRBC 0 11/17/2023   ,   BMP/CMP:  Lab Results   Component Value Date    SODIUM 139 11/17/2023    K 3.8 11/17/2023     (H) 11/17/2023    CO2 24 11/17/2023    CO2 20 (L) 11/16/2023    BUN 29 (H) 11/17/2023    CREATININE 1.49 (H) 11/17/2023    GLUCOSE 159 (H) 11/16/2023    CALCIUM 7.4 (L) 11/17/2023    AST 24 10/04/2023    ALT 21 10/04/2023    ALKPHOS 36 10/04/2023    EGFR 43 11/17/2023   ,   Lipid Panel: No results found for: "CHOL",   Coags:   Lab Results   Component Value Date    PTT 29 11/16/2023    INR 1.15 11/16/2023   ,     Blood Culture:   Lab Results   Component Value Date    BLOODCX No Growth After 5 Days. 06/16/2023    BLOODCX No Growth After 5 Days. 06/16/2023   ,   Urinalysis:   Lab Results   Component Value Date    COLORU Yellow 06/17/2023    CLARITYU Clear 06/17/2023    SPECGRAV 1.010 06/17/2023    PHUR 5.5 06/17/2023    LEUKOCYTESUR (A) 06/17/2023     Elevated glucose may cause decreased leukocyte values.  See urine microscopic for [facility] result    NITRITE Negative 06/17/2023    GLUCOSEU >=1000 (1%) (A) 06/17/2023    KETONESU Negative 06/17/2023    BILIRUBINUR Negative 06/17/2023    BLOODU Negative 06/17/2023   ,   Urine Culture: No results found for: "URINECX",   Wound Culure:   Lab Results   Component Value Date    WOUNDCULT 1+ Growth of 06/08/2023       Medications:  Current Facility-Administered Medications   Medication Dose Route Frequency    acetaminophen (TYLENOL) tablet 650 mg  650 mg Oral Q6H [address]    amLODIPine (NORVASC) tablet 5 mg  5 mg Oral Daily    aspirin chewable tablet 81 mg  81 mg Oral Daily    atorvastatin (LIPITOR) tablet 40 mg  40 mg Oral QPM    chlorhexidine (PERIDEX) 0.12 % oral rinse 15 mL  15 mL Mouth/Throat Q12H CONSTANTINE    cloNIDine (CATAPRES) tablet 0.1 mg  0.1 mg Oral Q12H    clopidogrel (PLAVIX) tablet 75 mg  75 mg Oral Daily    fenofibrate (TRICOR) tablet 145 mg  145 mg Oral Daily    gabapentin (NEURONTIN) capsule 600 mg  600 mg Oral BID    heparin (porcine) subcutaneous injection 5,000 Units  5,000 Units Subcutaneous Q8H CONSTANTINE    HYDROmorphone (DILAUDID) injection 0.5 mg  0.5 mg Intravenous Q6H PRN    insulin glargine (LANTUS) subcutaneous injection 15 Units 0.15 mL  15 Units Subcutaneous HS    insulin lispro (HumaLOG) 100 units/mL subcutaneous injection 1-6 Units  1-6 Units Subcutaneous 4x Daily (AC & HS)    isosorbide mononitrate (IMDUR) 24 hr tablet 60 mg  60 mg Oral Daily    metoprolol tartrate (LOPRESSOR) tablet 50 mg  50 mg Oral Q12H CONSTANTINE    ondansetron (ZOFRAN) injection 4 mg  4 mg Intravenous Q4H PRN    oxyCODONE (ROXICODONE) immediate release tablet 10 mg  10 mg Oral Q4H PRN    oxyCODONE (ROXICODONE) IR tablet 5 mg  5 mg Oral Q4H PRN    ranolazine (RANEXA) 12 hr tablet 500 mg  500 mg Oral BID       Imaging:  IR lower extremity angiogram    (Results Pending)         Physical Exam:    General: AAOx3. Sitting in bed in NAD. Head: NCAT  CV: RRR  Respiratory: CTA B/L. Normal effort. Abdominal: Soft. NT. ND  Extremities: BLE warm with 1+ edema from calf to foot. Right groin incision c/d/I with surgical glue, no infection or puslatile mass. Right calf incision c/d/I with staples in place; no drainage, erythema, dehiscence, masses.    Neurologic: Grossly normal       Wound/Incision:            Pulse exam:  Femoral: Right: 2+ Left: 2+  DP: Right: doppler signal   PT: Right: doppler signal   Peroneal: Doppler signal     +RLE bypass doppler signal     Talon Fisher PA-C  11/18/2023

## 2023-11-18 NOTE — PLAN OF CARE
Problem: PAIN - ADULT  Goal: Verbalizes/displays adequate comfort level or baseline comfort level  Description: Interventions:  - Encourage patient to monitor pain and request assistance  - Assess pain using appropriate pain scale  - Administer analgesics based on type and severity of pain and evaluate response  - Implement non-pharmacological measures as appropriate and evaluate response  - Consider cultural and social influences on pain and pain management  - Notify physician/advanced practitioner if interventions unsuccessful or patient reports new pain  Outcome: Progressing     Problem: INFECTION - ADULT  Goal: Absence or prevention of progression during hospitalization  Description: INTERVENTIONS:  - Assess and monitor for signs and symptoms of infection  - Monitor lab/diagnostic results  - Monitor all insertion sites, i.e. indwelling lines, tubes, and drains  - Monitor endotracheal if appropriate and nasal secretions for changes in amount and color  - [place] appropriate cooling/warming therapies per order  - Administer medications as ordered  - Instruct and encourage patient and family to use good hand hygiene technique  - Identify and instruct in appropriate isolation precautions for identified infection/condition  Outcome: Progressing  Goal: Absence of fever/infection during neutropenic period  Description: INTERVENTIONS:  - Monitor WBC    Outcome: Progressing     Problem: SAFETY ADULT  Goal: Patient will remain free of falls  Description: INTERVENTIONS:  - Educate patient/family on patient safety including physical limitations  - Instruct patient to call for assistance with activity   - Consult OT/PT to assist with strengthening/mobility   - Keep Call bell within reach  - Keep bed low and locked with side rails adjusted as appropriate  - Keep care items and personal belongings within reach  - Initiate and maintain comfort rounds  - Make Fall Risk Sign visible to staff  - Offer Toileting every 2 Hours, in advance of need  - Initiate/Maintain bed alarm  - Obtain necessary fall risk management equipment:   - Apply yellow socks and bracelet for high fall risk patients  - Consider moving patient to room near nurses station  Outcome: Progressing  Goal: Maintain or return to baseline ADL function  Description: INTERVENTIONS:  -  Assess patient's ability to carry out ADLs; assess patient's baseline for ADL function and identify physical deficits which impact ability to perform ADLs (bathing, care of mouth/teeth, toileting, grooming, dressing, etc.)  - Assess/evaluate cause of self-care deficits   - Assess range of motion  - Assess patient's mobility; develop plan if impaired  - Assess patient's need for assistive devices and provide as appropriate  - Encourage maximum independence but intervene and supervise when necessary  - Involve family in performance of ADLs  - Assess for home care needs following discharge   - Consider OT consult to assist with ADL evaluation and planning for discharge  - Provide patient education as appropriate  Outcome: Progressing  Goal: Maintains/Returns to pre admission functional level  Description: INTERVENTIONS:  - Perform AM-PAC 6 Click Basic Mobility/ Daily Activity assessment daily.  - Set and communicate daily mobility goal to care team and patient/family/caregiver. - Collaborate with rehabilitation services on mobility goals if consulted  - Perform Range of Motion 8 times a day. - Reposition patient every 2 hours.   - Dangle patient 3 times a day  - Stand patient 3 times a day  - Ambulate patient 3 times a day  - Out of bed to chair 3 times a day   - Out of bed for meals 3 times a day  - Out of bed for toileting  - Record patient progress and toleration of activity level   Outcome: Progressing     Problem: DISCHARGE PLANNING  Goal: Discharge to home or other facility with appropriate resources  Description: INTERVENTIONS:  - Identify barriers to discharge w/patient and caregiver  - Arrange for needed discharge resources and transportation as appropriate  - Identify discharge learning needs (meds, wound care, etc.)  - Arrange for interpretive services to assist at discharge as needed  - Refer to Case Management Department for coordinating discharge planning if the patient needs post-hospital services based on physician/advanced practitioner order or complex needs related to functional status, cognitive ability, or social support system  Outcome: Progressing     Problem: Knowledge Deficit  Goal: Patient/family/caregiver demonstrates understanding of disease process, treatment plan, medications, and discharge instructions  Description: Complete learning assessment and assess knowledge base.   Interventions:  - Provide teaching at level of understanding  - Provide teaching via preferred learning methods  Outcome: Progressing     Problem: HEMATOLOGIC - ADULT  Goal: Maintains hematologic stability  Description: INTERVENTIONS  - Assess for signs and symptoms of bleeding or hemorrhage  - Monitor labs  - Administer supportive blood products/factors as ordered and appropriate  Outcome: Progressing     Problem: Prexisting or High Potential for Compromised Skin Integrity  Goal: Skin integrity is maintained or improved  Description: INTERVENTIONS:  - Identify patients at risk for skin breakdown  - Assess and monitor skin integrity  - Assess and monitor nutrition and hydration status  - Monitor labs   - Assess for incontinence   - Turn and reposition patient  - Assist with mobility/ambulation  - Relieve pressure over bony prominences  - Avoid friction and shearing  - Provide appropriate hygiene as needed including keeping skin clean and dry  - Evaluate need for skin moisturizer/barrier cream  - Collaborate with interdisciplinary team   - Patient/family teaching  - Consider wound care consult   Outcome: Progressing

## 2023-11-18 NOTE — ASSESSMENT & PLAN NOTE
79 yo male w/ a hx of remote smoker, DM II, HTN, CKD III, CAD, HLD, PAD, AAA S/P EVAR, dCHF and frequent PVCs presented to [facility] on 11/16/23 for a scheduled RLE bypass. Pt has a hx of PAD w/ a non-healing R heel wound. Pt had a RLE angiogram which showed a long segment occlusion of SFA, AT and PT. Decision was made to bypass to the peroneal artery, which is the only remaining patent tibial vessel. Pt underwent R CFA to peroneal artery bypass w/ reversed cryovein on 11/16/23. Podiatry consulted for non-healing R heel wound. Wound was debrided at bedside w/ plan for local wound care. Urology consulted re: pt's inability to void. Pt has a hx of a robot-assisted radical prostatectomy in 2015 w/ artificial urinary sphincter placement in 2017. Gilbert placed. Per urology, gilbert should remain in place until at least tomorrow, or until pt is more mobile. Plan:  -RLE CLTI w/ tissue loss  -S/P R CFA to peroneal artery bypass w/ reversed cryovein on 11/16 by Dr. Urszula Orozco.   -R calf and groin incisions c/d/I without erythema, drainage, dehiscence, hematoma or pulsatile mass. Tenderness to palpation to R calf region. RLE bypass appears clinically patent with audible DP/PT and peroneal signals. Motor and sensation grossly intact. 2+ R fem pulse.  -Continue neurovascular checks  -OOB   -Consult PT/OT (P)  -Urology following for hx of artificial urinary sphincter and inability to void; input appreciated. Continue gilbert, per urology recommendations  -Podiatry following for local wound care; input appreciated. No plans for surgical intervention at this time.  -Continue ASA, plavix, lipitor and trental for medical management of PAD  -Dispo planning. Awaiting PT/OT recommendations, pain control.   -Will discuss w/ Dr. Laurie Vergara.

## 2023-11-18 NOTE — PLAN OF CARE
Problem: PHYSICAL THERAPY ADULT  Goal: Performs mobility at highest level of function for planned discharge setting. See evaluation for individualized goals. Description: Treatment/Interventions: Functional transfer training, Elevations, LE strengthening/ROM, Therapeutic exercise, Endurance training, Patient/family training, Equipment eval/education, Bed mobility, Gait training, Compensatory technique education, Continued evaluation, Spoke to nursing, OT  Equipment Recommended: Emilee Hawkins       See flowsheet documentation for full assessment, interventions and recommendations. Outcome: Progressing  Note: Prognosis: Fair  Problem List: Decreased strength, Decreased range of motion, Decreased endurance, Impaired balance, Decreased mobility, Decreased coordination, Impaired sensation, Decreased skin integrity, Pain  Assessment: Martha Posada is a 78 y.o. male with past medical history of T2DM with polyneuropathy, CKD, PAD, CAD, CHF, pulm HTN, HTN. He underwent a right SFA to peroneal artery bypass 11/16 with Vascular to optimize healing of a right plantar heel wound sustained 4/2023 p stepping on nail. PT consulted. WBAT in heel offloading/globoped shoe. Prior to admission reported to be independent without AD use.  + driving and active. Resides with daughter in 2 story home. Denies falls. Currently presents with functional limitations related to post operative pain, decreased RLE strength, impaired sensation with hx of nueropathy, decreased activity tolerance, balance, functional mobility, impaired posture, skin integrity with need for heel offloading shoe with ambulation and impaired locomotion requiring more restrictive AD use of RW support. Sit to stand with max A of 2. Increased time to achieve upright stance with RW support. Flexed B/L knees and trunk in stance observed. Ambulation progressed from max A of 1 to modA of 1 with chair follow.   Gait slowed, step to pattern, cues for sequencing and assist for RW management needed. Increased time to ambulate short distances. Given impairments and decline from independence will require skilled PT in order to progress and optimize functional outcomes. The patient's AM-PAC Basic Mobility Inpatient Short Form Raw Score is 8. A Raw score of less than or equal to 16 suggests the patient may benefit from discharge to post-acute rehabilitation services. Please also refer to the recommendation of the Physical Therapist for safe discharge planning. At this time will require moderate resource intensity at discharge. PT will follow and progress per POC 3-5x/wk. Barriers to Discharge: Inaccessible home environment, Decreased caregiver support  Barriers to Discharge Comments: stairs, alone when daughter working  [employer] Intensity Level, PT: II (Moderate Resource Intensity)    See flowsheet documentation for full assessment.

## 2023-11-18 NOTE — PHYSICAL THERAPY NOTE
PT EVALUATION 12:55-13:22    78 y.o.    [id number]    Diabetic ulcer of right midfoot associated with type 2 diabetes mellitus, limited to breakdown of skin ([address]) [S80.454, L97.411]    Past Medical History:   Diagnosis Date    CAD (coronary artery disease)     Cancer ([address])     prostate    CHF (congestive heart failure) ([address])     Chronic kidney disease     Diabetes mellitus ([address])     Hyperlipidemia     Hypertension     Myocardial infarction ([address])     Neuropathy     Bilateral feet    Sleep apnea     Could not tolerate CPAP         Past Surgical History:   Procedure Laterality Date    ADENOIDECTOMY      CARDIAC CATHETERIZATION Left 10/19/2022    Procedure: Cardiac Left Heart Cath;  Surgeon: Yamilka Rice MD;  [facility]; Service: Cardiology    CARDIAC SURGERY  2002    3 cardiac bypass then angioplasty 7/2020    CHOLECYSTECTOMY      COLONOSCOPY      CORONARY ARTERY BYPASS GRAFT      IR LOWER EXTREMITY ANGIOGRAM  11/1/2023    IN BYPASS W/VEIN FEMORAL-POPLITEAL Right 11/16/2023    Procedure: BYPASS FEMORAL-POPLITEAL WITH CRYO VEIN, RIGHT FEMORAL ENDARTERECTOMY;  Surgeon: Sharmila Pavon MD;  Location: [facility];  Service: Vascular    IN Mary Rios 3RD+ ORD [address] [facility] Right 11/1/2023    Procedure: ARTERIOGRAM Right lower extremity arteriogram with CO2 via right groin access;  Surgeon: Sharmila Pavon MD;  Location: [facility];  Service: Vascular    PROSTATE SURGERY      TONSILLECTOMY          11/18/23 1255   PT Last Visit   PT Visit Date 11/18/23   Note Type   Note type Evaluation   Type of Brace   Brace Applied Globoped Shoe (Heel Unloading)   Education   Education Provided Yes   End of Consult   Patient Position at End of Consult Supine; All needs within reach;Bed/Chair alarm activated   Pain Assessment   Pain Location/Orientation Orientation: Right;Location: Groin; Location: Leg   Pain Rating: FLACC (Rest) - Face 0   Pain Rating: FLACC (Rest) - Legs 0 Pain Rating: FLACC (Rest) - Activity 0   Pain Rating: FLACC (Rest) - Cry 1   Pain Rating: FLACC (Rest) - Consolability 1   Score: FLACC (Rest) 2   Pain Rating: FLACC (Activity) - Face 1   Pain Rating: FLACC (Activity) - Legs 1   Pain Rating: FLACC (Activity) - Activity 1   Pain Rating: FLACC (Activity) - Cry 1   Pain Rating: FLACC (Activity) - Consolability 1   Score: FLACC (Activity) 5   Restrictions/Precautions   RLE Weight Bearing Per Order (S)  WBAT   Braces or Orthoses (S)  Other (Comment)  (heel unloading shoe)   Other Precautions Fall Risk;Pain; Chair Alarm; Bed Alarm;WBS   Home Living   Type of [address] [place] [address] Two level;Bed/bath upstairs;1/2 bath on main level;Stairs to enter with rails  (4 large GABE)   Bathroom Shower/Tub Walk-in shower   Bathroom Toilet Raised   Bathroom Equipment   (none)   Bathroom Accessibility Accessible   Home Equipment Walker  (was wife's)   Additional Comments resides with daugther in old [facility] style home. Bed and bath on 2nd floor wt 17 steps to access. + . Prior Function   Level of [place] Independent with ADLs; Independent with functional mobility   Lives With Daughter   Receives Help From Family   IADLs Independent with driving; Independent with meal prep; Independent with medication management   Falls in the last 6 months 0   Vocational Retired   Comments I PTA without AD use.  + . Alone when daughter working. General   Additional Pertinent History Pt is 77 y/o male admitted for RLE bypass, nonhealing L heel wound. WBAT post operatively with heel unloading shoe. Family/Caregiver Present No   Cognition   Overall Cognitive Status WFL   Arousal/Participation Alert   Orientation Level Oriented X4   Following Commands Follows one step commands without difficulty   Comments Pleasant. Subjective   Subjective Reports pain in RLE. Hx of neuropathy.    RUE Assessment   RUE Assessment WFL   LUE Assessment   LUE Assessment WFL  (L hand edema.)   RLE Assessment   RLE Assessment X  (limited by pain)   Strength RLE   RLE Overall Strength 3-/5   LLE Assessment   LLE Assessment X   Strength LLE   LLE Overall Strength 3+/5   Coordination   Movements are Fluid and Coordinated 0   Coordination and Movement Description weakness, pain, nueropathy   Light Touch   RLE Light Touch Absent   RLE Light Touch Comments neuropathy   LLE Light Touch Absent   LLE Light Touch Comments neuropathy   Bed Mobility   Sit to Supine 3  Moderate assistance   Additional items Assist x 2; Increased time required;Verbal cues   Additional Comments Received sitting OOB in chair. Transfers   Sit to Stand 2  Maximal assistance   Additional items Assist x 2; Increased time required;Verbal cues   Stand to Sit 3  Moderate assistance   Additional items Assist x 1; Increased time required;Verbal cues   Additional Comments Increased time to complete transition. received sitting OOB in chair. Cues for hand placement. Increased time to acheive full upright posture. Increased knee and trunk flexion. Ambulation/Elevation   Gait pattern Improper Weight shift;Decreased foot clearance   Gait Assistance 3  Moderate assist   Additional items Assist x 1;Verbal cues; Tactile cues  (chair follow)   Assistive Device Rolling walker   Distance Amb with RW 15'x1. Increased time. Chair follow. A for RW management, cues for sequencing. Ambulation/Elevation Additional Comments Improved level of assistance over distances from max to modA. Balance   Static Sitting Fair   Dynamic Sitting Fair -   Static Standing Poor +   Dynamic Standing Poor   Ambulatory Poor   Endurance Deficit   Endurance Deficit Yes   Endurance Deficit Description fatigue, pain, weakness. Activity Tolerance   Activity Tolerance Patient limited by fatigue;Patient limited by pain;Treatment limited secondary to medical complications (Comment)   Medical Staff Made Aware Darwin Hare Plan.  [place]:Pt seen for co-evaluation/treatment with skilled Occupational Therapist 2* clinically unstable/unpredictable presentation, medical complexity, fall risk,pain, WBS, functional/physical limitations, impaired functional balance,  limited activity tolerance which is decline from PLOF and may impact overall functional mobility/mobility safety. Nurse Made Aware yes   Assessment   Prognosis Fair   Problem List Decreased strength;Decreased range of motion;Decreased endurance; Impaired balance;Decreased mobility; Decreased coordination; Impaired sensation;Decreased skin integrity;Pain   Assessment Deejay Scott is a 78 y.o. male with past medical history of T2DM with polyneuropathy, CKD, PAD, CAD, CHF, pulm HTN, HTN. He underwent a right SFA to peroneal artery bypass 11/16 with Vascular to optimize healing of a right plantar heel wound sustained 4/2023 p stepping on nail. PT consulted. WBAT in heel offloading/globoped shoe. Prior to admission reported to be independent without AD use.  + driving and active. Resides with daughter in 2 story home. Denies falls. Currently presents with functional limitations related to post operative pain, decreased RLE strength, impaired sensation with hx of nueropathy, decreased activity tolerance, balance, functional mobility, impaired posture, skin integrity with need for heel offloading shoe with ambulation and impaired locomotion requiring more restrictive AD use of RW support. Sit to stand with max A of 2. Increased time to achieve upright stance with RW support. Flexed B/L knees and trunk in stance observed. Ambulation progressed from max A of 1 to modA of 1 with chair follow. Gait slowed, step to pattern, cues for sequencing and assist for RW management needed. Increased time to ambulate short distances. Given impairments and decline from independence will require skilled PT in order to progress and optimize functional outcomes. The patient's AM-Located within [facility] Basic Mobility Inpatient Short Form Raw Score is 8.  A Raw score of less than or equal to 16 suggests the patient may benefit from discharge to post-acute rehabilitation services. Please also refer to the recommendation of the Physical Therapist for safe discharge planning. At this time will require moderate resource intensity at discharge. PT will follow and progress per POC 3-5x/wk. Barriers to Discharge Inaccessible home environment;Decreased caregiver support   Barriers to Discharge Comments stairs, alone when daughter working   Goals   Patient Goals get better   STG Expiration Date 12/01/23   Short Term Goal #1 14 days: 1). Pt will perform bed mobility with Celsa demonstrating appropriate technique 100% of the time in order to improve function. 2)  Perform all transfers with Celsa demonstrating safe and appropriate technique 100% of the time in order to improve ability to negotiate safely in home environment. 3) Amb with least restrictive AD > 100'x1 with mod I in order to demonstrate ability to negotiate in home environment. 4)  Improve overall strength and balance 1/2 grade in order to optimize ability to perform functional tasks and reduce fall risk. 5) Increase activity tolerance to 30 minutes in order to improve endurance to functional tasks. 6)  Negotiate stairs using most appropriate technique and S in order to be able to negotiate safely in home environment. 7) PT for ongoing patient and family/caregiver education, DME needs and d/c planning in order to promote highest level of function in least restrictive environment. Plan   Treatment/Interventions Functional transfer training;Elevations;LE strengthening/ROM; Therapeutic exercise; Endurance training;Patient/family training;Equipment eval/education; Bed mobility;Gait training; Compensatory technique education;Continued evaluation;Spoke to nursing;OT   PT Frequency 3-5x/wk   Discharge Recommendation   Rehab Resource Intensity Level, PT II (Moderate Resource Intensity)   Equipment Recommended Rosanne Peers Package Recommended Wheeled walker   AM-PAC Basic Mobility Inpatient   Turning in Flat Bed Without Bedrails 2   Lying on Back to Sitting on Edge of Flat Bed Without Bedrails 1   Moving Bed to Chair 1   Standing Up From Chair Using Arms 1   Walk in Room 2   Climb 3-5 Stairs With Railing 1   Basic Mobility Inpatient Raw Score 8   Turning Head Towards Sound 4   Follow Simple Instructions 4   Low Function Basic Mobility Raw Score  16   Low Function Basic Mobility Standardized Score  25.72   Highest Level Of Mobility   -[facility] Goal 3: Sit at edge of bed   -HLM Achieved 6: Walk 10 steps or more   End of Consult   Patient Position at End of Consult Supine;Bed/Chair alarm activated; All needs within reach     Hx/personal factors: GABE home environment, steps within home environment, difficulty performing ADLs, difficulty performing IADLs, difficulty performing transfers/mobility, fall risk , functional decline , increased reliance on DME , new use of AD for functional transfers/mobility, and advanced age, comorbidities    Examination:decreased strength , decreased LE ROM, decreased balance, decreased activity tolerance, gait deviations, limited functional reach, impaired sensation, increased pain, decreased postural control, impaired posture, decreased skin integrity , and WBS. Clinical: unpredictable Ongoing medical status, Trending/abnormal lab values, Risk for falls, bed/chair alarm, Continuous monitoring, Pain management, Decreased activity tolerance compared to baseline, More restrictive AD use than baseline, and Decline from PLOF. Natalia Storey      Complexity: high           Dee Christie PT

## 2023-11-19 LAB
GLUCOSE SERPL-MCNC: 108 MG/DL (ref 65–140)
GLUCOSE SERPL-MCNC: 125 MG/DL (ref 65–140)
GLUCOSE SERPL-MCNC: 142 MG/DL (ref 65–140)
GLUCOSE SERPL-MCNC: 145 MG/DL (ref 65–140)
GLUCOSE SERPL-MCNC: 160 MG/DL (ref 65–140)

## 2023-11-19 PROCEDURE — 99024 POSTOP FOLLOW-UP VISIT: CPT

## 2023-11-19 PROCEDURE — 82948 REAGENT STRIP/BLOOD GLUCOSE: CPT

## 2023-11-19 RX ADMIN — ATORVASTATIN CALCIUM 40 MG: 40 TABLET, FILM COATED ORAL at 17:36

## 2023-11-19 RX ADMIN — OXYCODONE HYDROCHLORIDE 10 MG: 10 TABLET ORAL at 08:17

## 2023-11-19 RX ADMIN — ISOSORBIDE MONONITRATE 60 MG: 60 TABLET, EXTENDED RELEASE ORAL at 08:14

## 2023-11-19 RX ADMIN — GABAPENTIN 600 MG: 300 CAPSULE ORAL at 17:36

## 2023-11-19 RX ADMIN — CHLORHEXIDINE GLUCONATE 15 ML: 1.2 RINSE ORAL at 22:13

## 2023-11-19 RX ADMIN — RANOLAZINE 500 MG: 500 TABLET, EXTENDED RELEASE ORAL at 22:13

## 2023-11-19 RX ADMIN — AMLODIPINE BESYLATE 5 MG: 5 TABLET ORAL at 08:14

## 2023-11-19 RX ADMIN — ACETAMINOPHEN 325MG 650 MG: 325 TABLET ORAL at 17:36

## 2023-11-19 RX ADMIN — HEPARIN SODIUM 5000 UNITS: 5000 INJECTION INTRAVENOUS; SUBCUTANEOUS at 22:13

## 2023-11-19 RX ADMIN — INSULIN LISPRO 1 UNITS: 100 INJECTION, SOLUTION INTRAVENOUS; SUBCUTANEOUS at 22:13

## 2023-11-19 RX ADMIN — OXYCODONE HYDROCHLORIDE 10 MG: 10 TABLET ORAL at 14:56

## 2023-11-19 RX ADMIN — METOPROLOL TARTRATE 50 MG: 50 TABLET, FILM COATED ORAL at 08:14

## 2023-11-19 RX ADMIN — HEPARIN SODIUM 5000 UNITS: 5000 INJECTION INTRAVENOUS; SUBCUTANEOUS at 05:52

## 2023-11-19 RX ADMIN — CLONIDINE HYDROCHLORIDE 0.1 MG: 0.1 TABLET ORAL at 12:45

## 2023-11-19 RX ADMIN — HEPARIN SODIUM 5000 UNITS: 5000 INJECTION INTRAVENOUS; SUBCUTANEOUS at 14:31

## 2023-11-19 RX ADMIN — GABAPENTIN 600 MG: 300 CAPSULE ORAL at 08:14

## 2023-11-19 RX ADMIN — FENOFIBRATE 145 MG: 145 TABLET, FILM COATED ORAL at 08:13

## 2023-11-19 RX ADMIN — ACETAMINOPHEN 325MG 650 MG: 325 TABLET ORAL at 05:53

## 2023-11-19 RX ADMIN — METOPROLOL TARTRATE 50 MG: 50 TABLET, FILM COATED ORAL at 22:13

## 2023-11-19 RX ADMIN — CLOPIDOGREL BISULFATE 75 MG: 75 TABLET ORAL at 08:14

## 2023-11-19 RX ADMIN — INSULIN GLARGINE 15 UNITS: 100 INJECTION, SOLUTION SUBCUTANEOUS at 22:13

## 2023-11-19 RX ADMIN — ASPIRIN 81 MG CHEWABLE TABLET 81 MG: 81 TABLET CHEWABLE at 08:13

## 2023-11-19 RX ADMIN — ACETAMINOPHEN 325MG 650 MG: 325 TABLET ORAL at 12:43

## 2023-11-19 RX ADMIN — RANOLAZINE 500 MG: 500 TABLET, EXTENDED RELEASE ORAL at 08:14

## 2023-11-19 RX ADMIN — OXYCODONE HYDROCHLORIDE 10 MG: 10 TABLET ORAL at 22:13

## 2023-11-19 RX ADMIN — CHLORHEXIDINE GLUCONATE 15 ML: 1.2 RINSE ORAL at 08:18

## 2023-11-19 NOTE — PROGRESS NOTES
[facility]  Progress Note  Name: Wen Cordero  MRN: [id number]  Unit/Bed#: [place] 2 -02 I Date of Admission: 2023   Date of Service: 2023 I Hospital Day: 3    Assessment/Plan   Peripheral artery disease [facility])  Assessment & Plan  79 yo male w/ a hx of remote smoker, DM II, HTN, CKD III, CAD, HLD, PAD, AAA S/P EVAR, dCHF and frequent PVCs presented to [facility] on 23 for a elective RLE bypass for a non-healing R heel wound. POD #3 S/p R CFA to peroneal artery bypass with reversed cryovein 23 ([facility])     Exam: VSS on RA. Afebrile. Right calf and groin incisions c/d/I without erythema, drainage, dehiscence, hematoma or pulsatile mass. Tenderness to palpation to R calf region. 1+ edema in right foot. Right foot ulceration appears stable without signs of infection, decreased sensation. Motor slightly diminished at level of toe, ankle, knee post operatively. RLE bypass appears clinically patent with audible DP/PT and peroneal signals. Motor and sensation grossly intact. 2+ R fem pulse. Plan:  -RLE CLTI w/ tissue loss  -POD#3. S/P R CFA to peroneal artery bypass w/ reversed cryovein on  by Dr. Debra Osuna.   -Continue daily local wound care to operative incisions per nursing. Order placed. -OOB   -PT/OT consulted. Appreciate input. Patient would benefit from post acute rehab placement.   -Case management consulted for discharge planning. (P)  -Urology following for hx of artificial urinary sphincter and inability to void; input appreciated. Polish gilbert removed , condom catheter placed . 300 CC of clear yellow urine in bag.   -Podiatry following for local wound care; input appreciated. No plans for surgical intervention at this time.  -Continue ASA, plavix, lipitor for medical management of PAD  -Dispo planning. Case management consulted for assistance.    -Will discuss w/ Dr. Mirian Arita.         Subjective: Patient denies any acute complaints today. He states his pain is slightly better today. He was seen by PT/OT and ambulated short distances with assistance. He does not feel ready for discharge at this time. He reports pain in the posterior calf and in the right plantar foot. Vitals:  /61   Pulse (!) 54   Temp 97.9 °F (36.6 °C)   Resp 15   Ht 6' 2" (1.88 m)   Wt 116 kg (256 lb 13.4 oz)   SpO2 95%   BMI 32.98 kg/m²     I/Os:  I/O last 3 completed shifts:  In: -   Out: 975 [Urine:975]  No intake/output data recorded. Lab Results and Cultures:   CBC with diff:   Lab Results   Component Value Date    WBC 9.02 11/17/2023    HGB 8.0 (L) 11/17/2023    HCT 25.7 (L) 11/17/2023    MCV 89 11/17/2023     11/17/2023    RBC 2.89 (L) 11/17/2023    MCH 27.7 11/17/2023    MCHC 31.1 (L) 11/17/2023    RDW 13.8 11/17/2023    MPV 11.0 11/17/2023    NRBC 0 11/17/2023   ,   BMP/CMP:  Lab Results   Component Value Date    SODIUM 139 11/17/2023    K 3.8 11/17/2023     (H) 11/17/2023    CO2 24 11/17/2023    CO2 20 (L) 11/16/2023    BUN 29 (H) 11/17/2023    CREATININE 1.49 (H) 11/17/2023    GLUCOSE 159 (H) 11/16/2023    CALCIUM 7.4 (L) 11/17/2023    AST 24 10/04/2023    ALT 21 10/04/2023    ALKPHOS 36 10/04/2023    EGFR 43 11/17/2023   ,   Lipid Panel: No results found for: "CHOL",   Coags:   Lab Results   Component Value Date    PTT 29 11/16/2023    INR 1.15 11/16/2023   ,     Blood Culture:   Lab Results   Component Value Date    BLOODCX No Growth After 5 Days. 06/16/2023    BLOODCX No Growth After 5 Days. 06/16/2023   ,   Urinalysis:   Lab Results   Component Value Date    COLORU Yellow 06/17/2023    CLARITYU Clear 06/17/2023    SPECGRAV 1.010 06/17/2023    PHUR 5.5 06/17/2023    LEUKOCYTESUR (A) 06/17/2023     Elevated glucose may cause decreased leukocyte values.  See urine microscopic for UWBC result    NITRITE Negative 06/17/2023    GLUCOSEU >=1000 (1%) (A) 06/17/2023    KETONESU Negative 06/17/2023    BILIRUBINUR Negative 06/17/2023    BLOODU Negative 06/17/2023   ,   Urine Culture: No results found for: "URINECX",   Wound Culure:   Lab Results   Component Value Date    WOUNDCULT 1+ Growth of 06/08/2023       Medications:  Current Facility-Administered Medications   Medication Dose Route Frequency    acetaminophen (TYLENOL) tablet 650 mg  650 mg Oral Q6H [address]    amLODIPine (NORVASC) tablet 5 mg  5 mg Oral Daily    aspirin chewable tablet 81 mg  81 mg Oral Daily    atorvastatin (LIPITOR) tablet 40 mg  40 mg Oral QPM    chlorhexidine (PERIDEX) 0.12 % oral rinse 15 mL  15 mL Mouth/Throat Q12H CONSTANTINE    cloNIDine (CATAPRES) tablet 0.1 mg  0.1 mg Oral Q12H    clopidogrel (PLAVIX) tablet 75 mg  75 mg Oral Daily    fenofibrate (TRICOR) tablet 145 mg  145 mg Oral Daily    gabapentin (NEURONTIN) capsule 600 mg  600 mg Oral BID    heparin (porcine) subcutaneous injection 5,000 Units  5,000 Units Subcutaneous Q8H CONSTANTINE    HYDROmorphone (DILAUDID) injection 0.5 mg  0.5 mg Intravenous Q6H PRN    insulin glargine (LANTUS) subcutaneous injection 15 Units 0.15 mL  15 Units Subcutaneous HS    insulin lispro (HumaLOG) 100 units/mL subcutaneous injection 1-6 Units  1-6 Units Subcutaneous 4x Daily (AC & HS)    isosorbide mononitrate (IMDUR) 24 hr tablet 60 mg  60 mg Oral Daily    metoprolol tartrate (LOPRESSOR) tablet 50 mg  50 mg Oral Q12H CONSTANTINE    ondansetron (ZOFRAN) injection 4 mg  4 mg Intravenous Q4H PRN    oxyCODONE (ROXICODONE) immediate release tablet 10 mg  10 mg Oral Q4H PRN    oxyCODONE (ROXICODONE) IR tablet 5 mg  5 mg Oral Q4H PRN    ranolazine (RANEXA) 12 hr tablet 500 mg  500 mg Oral BID       Imaging:  IR lower extremity angiogram    (Results Pending)         Physical Exam:    General: AAOx3. Laying in bed in NAD  CV: RRR  Respiratory: Normal effort on RA  Abdominal: Soft. NT. ND  Extremities: Right calf and groin incisions c/d/I without erythema, drainage, dehiscence, hematoma or pulsatile mass. Tenderness to palpation to R calf region.  1+ edema in right foot. Right foot ulceration appears stable without signs of infection, decreased sensation. Motor diminished at level of toe, ankle, knee post operatively. RLE bypass appears clinically patent with audible DP/PT and peroneal signals. Motor and sensation grossly intact. 2+ R fem pulse. Neurologic: grossly normal     Wound/Incision:  Right calf and groin incisions c/d/I without erythema, drainage, dehiscence, hematoma or pulsatile mass. 11/18/23        Pulse exam:  Femoral: Right: 2+ Left: 2+  DP: Right: doppler signal Left: doppler signal   PT: Right: doppler signal Left: doppler signal  Peroneal: Right: doppler signal     +doppler signals at RLE bypass distal incision. Nonpalpable bypass pulse.      Tiesha Fisher PA-C  11/19/2023

## 2023-11-19 NOTE — ASSESSMENT & PLAN NOTE
77 yo male w/ a hx of remote smoker, DM II, HTN, CKD III, CAD, HLD, PAD, AAA S/P EVAR, dCHF and frequent PVCs presented to [facility] on 11/16/23 for a scheduled RLE bypass. Pt has a hx of PAD w/ a non-healing R heel wound. Pt had a RLE angiogram which showed a long segment occlusion of SFA, AT and PT. Decision was made to bypass to the peroneal artery, which is the only remaining patent tibial vessel. Pt underwent R CFA to peroneal artery bypass w/ reversed cryovein on 11/16/23. Podiatry consulted for non-healing R heel wound. Wound was debrided at bedside w/ plan for local wound care. Urology consulted re: pt's inability to void. Pt has a hx of a robot-assisted radical prostatectomy in 2015 w/ artificial urinary sphincter placement in 2017. Gilbert placed. Per urology, gilbert should remain in place until at least tomorrow, or until pt is more mobile. Plan:  -RLE CLTI w/ tissue loss  -S/P R CFA to peroneal artery bypass w/ reversed cryovein on 11/16 by Dr. Camila Servin.   -R calf and groin incisions c/d/I without erythema, drainage, dehiscence, hematoma or pulsatile mass. Tenderness to palpation to R calf region. RLE bypass appears clinically patent with audible DP/PT and peroneal signals. Motor and sensation grossly intact. 2+ R fem pulse.  -Continue neurovascular checks  -OOB   -PT/OT consulted. Appreciate input. Patient would benefit from post acute rehab placement.   -Case management consulted for discharge planning. (P)  -Urology following for hx of artificial urinary sphincter and inability to void; input appreciated. -Podiatry following for local wound care; input appreciated. No plans for surgical intervention at this time.  -Continue ASA, plavix, lipitor for medical management of PAD  -Dispo planning. Awaiting PT/OT recommendations, pain control.   -Will discuss w/ Dr. Carlee Guerin.

## 2023-11-19 NOTE — PLAN OF CARE
Problem: PAIN - ADULT  Goal: Verbalizes/displays adequate comfort level or baseline comfort level  Description: Interventions:  - Encourage patient to monitor pain and request assistance  - Assess pain using appropriate pain scale  - Administer analgesics based on type and severity of pain and evaluate response  - Implement non-pharmacological measures as appropriate and evaluate response  - Consider cultural and social influences on pain and pain management  - Notify physician/advanced practitioner if interventions unsuccessful or patient reports new pain  Outcome: Progressing     Problem: INFECTION - ADULT  Goal: Absence or prevention of progression during hospitalization  Description: INTERVENTIONS:  - Assess and monitor for signs and symptoms of infection  - Monitor lab/diagnostic results  - Monitor all insertion sites, i.e. indwelling lines, tubes, and drains  - Monitor endotracheal if appropriate and nasal secretions for changes in amount and color  - [facility] appropriate cooling/warming therapies per order  - Administer medications as ordered  - Instruct and encourage patient and family to use good hand hygiene technique  - Identify and instruct in appropriate isolation precautions for identified infection/condition  Outcome: Progressing  Goal: Absence of fever/infection during neutropenic period  Description: INTERVENTIONS:  - Monitor WBC    Outcome: Progressing     Problem: SAFETY ADULT  Goal: Patient will remain free of falls  Description: INTERVENTIONS:  - Educate patient/family on patient safety including physical limitations  - Instruct patient to call for assistance with activity   - Consult OT/PT to assist with strengthening/mobility   - Keep Call bell within reach  - Keep bed low and locked with side rails adjusted as appropriate  - Keep care items and personal belongings within reach  - Initiate and maintain comfort rounds  - Make Fall Risk Sign visible to staff  - Offer Toileting every 2 Hours, in advance of need  - Initiate/Maintain bed alarm  - Obtain necessary fall risk management equipment:   - Apply yellow socks and bracelet for high fall risk patients  - Consider moving patient to room near nurses station  Outcome: Progressing  Goal: Maintain or return to baseline ADL function  Description: INTERVENTIONS:  -  Assess patient's ability to carry out ADLs; assess patient's baseline for ADL function and identify physical deficits which impact ability to perform ADLs (bathing, care of mouth/teeth, toileting, grooming, dressing, etc.)  - Assess/evaluate cause of self-care deficits   - Assess range of motion  - Assess patient's mobility; develop plan if impaired  - Assess patient's need for assistive devices and provide as appropriate  - Encourage maximum independence but intervene and supervise when necessary  - Involve family in performance of ADLs  - Assess for home care needs following discharge   - Consider OT consult to assist with ADL evaluation and planning for discharge  - Provide patient education as appropriate  Outcome: Progressing  Goal: Maintains/Returns to pre admission functional level  Description: INTERVENTIONS:  - Perform AM-PAC 6 Click Basic Mobility/ Daily Activity assessment daily.  - Set and communicate daily mobility goal to care team and patient/family/caregiver. - Collaborate with rehabilitation services on mobility goals if consulted  - Perform Range of Motion 8 times a day. - Reposition patient every 2 hours.   - Dangle patient 3 times a day  - Stand patient 3 times a day  - Ambulate patient 3 times a day  - Out of bed to chair 3 times a day   - Out of bed for meals 3 times a day  - Out of bed for toileting  - Record patient progress and toleration of activity level   Outcome: Progressing     Problem: DISCHARGE PLANNING  Goal: Discharge to home or other facility with appropriate resources  Description: INTERVENTIONS:  - Identify barriers to discharge w/patient and caregiver  - Arrange for needed discharge resources and transportation as appropriate  - Identify discharge learning needs (meds, wound care, etc.)  - Arrange for interpretive services to assist at discharge as needed  - Refer to Case Management Department for coordinating discharge planning if the patient needs post-hospital services based on physician/advanced practitioner order or complex needs related to functional status, cognitive ability, or social support system  Outcome: Progressing     Problem: Knowledge Deficit  Goal: Patient/family/caregiver demonstrates understanding of disease process, treatment plan, medications, and discharge instructions  Description: Complete learning assessment and assess knowledge base.   Interventions:  - Provide teaching at level of understanding  - Provide teaching via preferred learning methods  Outcome: Progressing     Problem: HEMATOLOGIC - ADULT  Goal: Maintains hematologic stability  Description: INTERVENTIONS  - Assess for signs and symptoms of bleeding or hemorrhage  - Monitor labs  - Administer supportive blood products/factors as ordered and appropriate  Outcome: Progressing     Problem: GASTROINTESTINAL - ADULT  Goal: Maintains adequate nutritional intake  Description: INTERVENTIONS:  - Monitor percentage of each meal consumed  - Identify factors contributing to decreased intake, treat as appropriate  - Assist with meals as needed  - Monitor I&O, weight, and lab values if indicated  - Obtain nutrition services referral as needed  Outcome: Progressing     Problem: METABOLIC, FLUID AND ELECTROLYTES - ADULT  Goal: Glucose maintained within target range  Description: INTERVENTIONS:  - Monitor Blood Glucose as ordered  - Assess for signs and symptoms of hyperglycemia and hypoglycemia  - Administer ordered medications to maintain glucose within target range  - Assess nutritional intake and initiate nutrition service referral as needed  Outcome: Progressing     Problem: SKIN/TISSUE INTEGRITY - ADULT  Goal: Incision(s), wounds(s) or drain site(s) healing without S/S of infection  Description: INTERVENTIONS  - Assess and document dressing, incision, wound bed, drain sites and surrounding tissue  - Provide patient and family education  - Perform skin care/dressing changes every shift per vascular   Outcome: Progressing     Problem: MUSCULOSKELETAL - ADULT  Goal: Maintain or return mobility to safest level of function  Description: INTERVENTIONS:  - Assess patient's ability to carry out ADLs; assess patient's baseline for ADL function and identify physical deficits which impact ability to perform ADLs (bathing, care of mouth/teeth, toileting, grooming, dressing, etc.)  - Assess/evaluate cause of self-care deficits   - Assess range of motion  - Assess patient's mobility  - Assess patient's need for assistive devices and provide as appropriate  - Encourage maximum independence but intervene and supervise when necessary  - Involve family in performance of ADLs  - Assess for home care needs following discharge   - Consider OT consult to assist with ADL evaluation and planning for discharge  - Provide patient education as appropriate  Outcome: Progressing     Problem: Prexisting or High Potential for Compromised Skin Integrity  Goal: Skin integrity is maintained or improved  Description: INTERVENTIONS:  - Identify patients at risk for skin breakdown  - Assess and monitor skin integrity  - Assess and monitor nutrition and hydration status  - Monitor labs   - Assess for incontinence   - Turn and reposition patient  - Assist with mobility/ambulation  - Relieve pressure over bony prominences  - Avoid friction and shearing  - Provide appropriate hygiene as needed including keeping skin clean and dry  - Evaluate need for skin moisturizer/barrier cream  - Collaborate with interdisciplinary team   - Patient/family teaching  - Consider wound care consult   Outcome: Progressing

## 2023-11-20 ENCOUNTER — APPOINTMENT (INPATIENT)
Dept: RADIOLOGY | Facility: HOSPITAL | Age: 78
DRG: 253 | End: 2023-11-20
Payer: COMMERCIAL

## 2023-11-20 PROBLEM — R06.03 ACUTE RESPIRATORY DISTRESS: Status: ACTIVE | Noted: 2023-11-20

## 2023-11-20 LAB
ABO GROUP BLD BPU: NORMAL
ABO GROUP BLD BPU: NORMAL
ANION GAP SERPL CALCULATED.3IONS-SCNC: 6 MMOL/L
ATRIAL RATE: 54 BPM
ATRIAL RATE: 56 BPM
ATRIAL RATE: 56 BPM
BASE EX.OXY STD BLDV CALC-SCNC: 50.4 % (ref 60–80)
BASE EXCESS BLDV CALC-SCNC: 2.2 MMOL/L
BPU ID: NORMAL
BPU ID: NORMAL
BUN SERPL-MCNC: 26 MG/DL (ref 5–25)
CALCIUM SERPL-MCNC: 8.3 MG/DL (ref 8.4–10.2)
CARDIAC TROPONIN I PNL SERPL HS: 49 NG/L (ref 8–18)
CARDIAC TROPONIN I PNL SERPL HS: 57 NG/L (ref 8–18)
CARDIAC TROPONIN I PNL SERPL HS: 66 NG/L (ref 8–18)
CHLORIDE SERPL-SCNC: 105 MMOL/L (ref 96–108)
CO2 SERPL-SCNC: 25 MMOL/L (ref 21–32)
CREAT SERPL-MCNC: 1.19 MG/DL (ref 0.6–1.3)
CROSSMATCH: NORMAL
CROSSMATCH: NORMAL
ERYTHROCYTE [DISTWIDTH] IN BLOOD BY AUTOMATED COUNT: 13.5 % (ref 11.6–15.1)
GFR SERPL CREATININE-BSD FRML MDRD: 57 ML/MIN/1.73SQ M
GLUCOSE SERPL-MCNC: 125 MG/DL (ref 65–140)
GLUCOSE SERPL-MCNC: 141 MG/DL (ref 65–140)
GLUCOSE SERPL-MCNC: 147 MG/DL (ref 65–140)
GLUCOSE SERPL-MCNC: 156 MG/DL (ref 65–140)
GLUCOSE SERPL-MCNC: 162 MG/DL (ref 65–140)
HCO3 BLDV-SCNC: 27.1 MMOL/L (ref 24–30)
HCT VFR BLD AUTO: 24.2 % (ref 36.5–49.3)
HGB BLD-MCNC: 8 G/DL (ref 12–17)
MCH RBC QN AUTO: 28.8 PG (ref 26.8–34.3)
MCHC RBC AUTO-ENTMCNC: 33.1 G/DL (ref 31.4–37.4)
MCV RBC AUTO: 87 FL (ref 82–98)
O2 CT BLDV-SCNC: 6 ML/DL
P AXIS: 113 DEGREES
P AXIS: 78 DEGREES
P AXIS: 88 DEGREES
PCO2 BLDV: 43.4 MM HG (ref 42–50)
PH BLDV: 7.41 [PH] (ref 7.3–7.4)
PLATELET # BLD AUTO: 157 THOUSANDS/UL (ref 149–390)
PMV BLD AUTO: 11.2 FL (ref 8.9–12.7)
PO2 BLDV: 27.3 MM HG (ref 35–45)
POTASSIUM SERPL-SCNC: 4 MMOL/L (ref 3.5–5.3)
PR INTERVAL: 212 MS
PR INTERVAL: 216 MS
PR INTERVAL: 216 MS
PROCALCITONIN SERPL-MCNC: 0.13 NG/ML
QRS AXIS: 232 DEGREES
QRSD INTERVAL: 120 MS
QRSD INTERVAL: 120 MS
QRSD INTERVAL: 122 MS
QT INTERVAL: 426 MS
QT INTERVAL: 434 MS
QT INTERVAL: 436 MS
QTC INTERVAL: 411 MS
QTC INTERVAL: 413 MS
QTC INTERVAL: 418 MS
RBC # BLD AUTO: 2.78 MILLION/UL (ref 3.88–5.62)
SARS-COV-2 RNA RESP QL NAA+PROBE: NEGATIVE
SODIUM SERPL-SCNC: 136 MMOL/L (ref 135–147)
T WAVE AXIS: -14 DEGREES
T WAVE AXIS: -27 DEGREES
T WAVE AXIS: 117 DEGREES
UNIT DISPENSE STATUS: NORMAL
UNIT DISPENSE STATUS: NORMAL
UNIT PRODUCT CODE: NORMAL
UNIT PRODUCT CODE: NORMAL
UNIT PRODUCT VOLUME: 350 ML
UNIT PRODUCT VOLUME: 350 ML
UNIT RH: NORMAL
UNIT RH: NORMAL
VENTRICULAR RATE: 54 BPM
VENTRICULAR RATE: 56 BPM
VENTRICULAR RATE: 56 BPM
WBC # BLD AUTO: 9.47 THOUSAND/UL (ref 4.31–10.16)

## 2023-11-20 PROCEDURE — 82948 REAGENT STRIP/BLOOD GLUCOSE: CPT

## 2023-11-20 PROCEDURE — 84484 ASSAY OF TROPONIN QUANT: CPT | Performed by: INTERNAL MEDICINE

## 2023-11-20 PROCEDURE — 87635 SARS-COV-2 COVID-19 AMP PRB: CPT | Performed by: INTERNAL MEDICINE

## 2023-11-20 PROCEDURE — 71045 X-RAY EXAM CHEST 1 VIEW: CPT

## 2023-11-20 PROCEDURE — 84484 ASSAY OF TROPONIN QUANT: CPT | Performed by: NURSE PRACTITIONER

## 2023-11-20 PROCEDURE — 85027 COMPLETE CBC AUTOMATED: CPT | Performed by: NURSE PRACTITIONER

## 2023-11-20 PROCEDURE — 80048 BASIC METABOLIC PNL TOTAL CA: CPT | Performed by: NURSE PRACTITIONER

## 2023-11-20 PROCEDURE — 99024 POSTOP FOLLOW-UP VISIT: CPT | Performed by: NURSE PRACTITIONER

## 2023-11-20 PROCEDURE — 82805 BLOOD GASES W/O2 SATURATION: CPT | Performed by: INTERNAL MEDICINE

## 2023-11-20 PROCEDURE — 84145 PROCALCITONIN (PCT): CPT | Performed by: INTERNAL MEDICINE

## 2023-11-20 PROCEDURE — 97535 SELF CARE MNGMENT TRAINING: CPT

## 2023-11-20 PROCEDURE — 99222 1ST HOSP IP/OBS MODERATE 55: CPT | Performed by: INTERNAL MEDICINE

## 2023-11-20 PROCEDURE — 97530 THERAPEUTIC ACTIVITIES: CPT

## 2023-11-20 PROCEDURE — 93005 ELECTROCARDIOGRAM TRACING: CPT

## 2023-11-20 PROCEDURE — 93010 ELECTROCARDIOGRAM REPORT: CPT | Performed by: STUDENT IN AN ORGANIZED HEALTH CARE EDUCATION/TRAINING PROGRAM

## 2023-11-20 RX ORDER — TORSEMIDE 20 MG/1
20 TABLET ORAL DAILY
Status: DISCONTINUED | OUTPATIENT
Start: 2023-11-21 | End: 2023-11-21 | Stop reason: HOSPADM

## 2023-11-20 RX ORDER — HYDROXYZINE HYDROCHLORIDE 25 MG/1
25 TABLET, FILM COATED ORAL EVERY 6 HOURS PRN
Status: DISCONTINUED | OUTPATIENT
Start: 2023-11-20 | End: 2023-11-21 | Stop reason: HOSPADM

## 2023-11-20 RX ORDER — CALCIUM CARBONATE 500 MG/1
500 TABLET, CHEWABLE ORAL 3 TIMES DAILY PRN
Status: DISCONTINUED | OUTPATIENT
Start: 2023-11-20 | End: 2023-11-21 | Stop reason: HOSPADM

## 2023-11-20 RX ORDER — LORAZEPAM 2 MG/ML
0.5 INJECTION INTRAMUSCULAR ONCE
Status: COMPLETED | OUTPATIENT
Start: 2023-11-20 | End: 2023-11-20

## 2023-11-20 RX ADMIN — ONDANSETRON 4 MG: 2 INJECTION INTRAMUSCULAR; INTRAVENOUS at 11:53

## 2023-11-20 RX ADMIN — CLONIDINE HYDROCHLORIDE 0.1 MG: 0.1 TABLET ORAL at 00:27

## 2023-11-20 RX ADMIN — CLONIDINE HYDROCHLORIDE 0.1 MG: 0.1 TABLET ORAL at 12:41

## 2023-11-20 RX ADMIN — HEPARIN SODIUM 5000 UNITS: 5000 INJECTION INTRAVENOUS; SUBCUTANEOUS at 21:21

## 2023-11-20 RX ADMIN — LORAZEPAM 0.5 MG: 2 INJECTION INTRAMUSCULAR; INTRAVENOUS at 12:41

## 2023-11-20 RX ADMIN — HEPARIN SODIUM 5000 UNITS: 5000 INJECTION INTRAVENOUS; SUBCUTANEOUS at 05:29

## 2023-11-20 RX ADMIN — AMLODIPINE BESYLATE 5 MG: 5 TABLET ORAL at 08:24

## 2023-11-20 RX ADMIN — GABAPENTIN 600 MG: 300 CAPSULE ORAL at 08:25

## 2023-11-20 RX ADMIN — INSULIN GLARGINE 15 UNITS: 100 INJECTION, SOLUTION SUBCUTANEOUS at 21:21

## 2023-11-20 RX ADMIN — GABAPENTIN 600 MG: 300 CAPSULE ORAL at 17:26

## 2023-11-20 RX ADMIN — ATORVASTATIN CALCIUM 40 MG: 40 TABLET, FILM COATED ORAL at 17:26

## 2023-11-20 RX ADMIN — ACETAMINOPHEN 325MG 650 MG: 325 TABLET ORAL at 05:29

## 2023-11-20 RX ADMIN — INSULIN LISPRO 1 UNITS: 100 INJECTION, SOLUTION INTRAVENOUS; SUBCUTANEOUS at 21:21

## 2023-11-20 RX ADMIN — METOPROLOL TARTRATE 50 MG: 50 TABLET, FILM COATED ORAL at 21:20

## 2023-11-20 RX ADMIN — ACETAMINOPHEN 325MG 650 MG: 325 TABLET ORAL at 17:26

## 2023-11-20 RX ADMIN — ISOSORBIDE MONONITRATE 60 MG: 60 TABLET, EXTENDED RELEASE ORAL at 08:23

## 2023-11-20 RX ADMIN — METOPROLOL TARTRATE 50 MG: 50 TABLET, FILM COATED ORAL at 08:24

## 2023-11-20 RX ADMIN — CALCIUM CARBONATE 500 MG: 500 TABLET, CHEWABLE ORAL at 09:15

## 2023-11-20 RX ADMIN — RANOLAZINE 500 MG: 500 TABLET, EXTENDED RELEASE ORAL at 08:25

## 2023-11-20 RX ADMIN — ACETAMINOPHEN 325MG 650 MG: 325 TABLET ORAL at 12:41

## 2023-11-20 RX ADMIN — HEPARIN SODIUM 5000 UNITS: 5000 INJECTION INTRAVENOUS; SUBCUTANEOUS at 12:41

## 2023-11-20 RX ADMIN — RANOLAZINE 500 MG: 500 TABLET, EXTENDED RELEASE ORAL at 21:20

## 2023-11-20 RX ADMIN — ACETAMINOPHEN 325MG 650 MG: 325 TABLET ORAL at 00:27

## 2023-11-20 RX ADMIN — INSULIN LISPRO 1 UNITS: 100 INJECTION, SOLUTION INTRAVENOUS; SUBCUTANEOUS at 08:27

## 2023-11-20 RX ADMIN — CLOPIDOGREL BISULFATE 75 MG: 75 TABLET ORAL at 08:25

## 2023-11-20 RX ADMIN — FENOFIBRATE 145 MG: 145 TABLET, FILM COATED ORAL at 08:23

## 2023-11-20 RX ADMIN — ASPIRIN 81 MG CHEWABLE TABLET 81 MG: 81 TABLET CHEWABLE at 08:22

## 2023-11-20 RX ADMIN — CHLORHEXIDINE GLUCONATE 15 ML: 1.2 RINSE ORAL at 08:28

## 2023-11-20 NOTE — NURSING NOTE
Patient external catheter emptied at 1115 this morning containing 150mL. Patient transferred to chair from bed and had one episode of urinary incontinence. External catheter emptied again at 1834 this evening containing 300mL. Patient bladder scanned for 435mL. Patient is passively draining urine and reports no pain or discomfort. No distention of abdomen. Oncoming nurse notified and urine output will be monitored.

## 2023-11-20 NOTE — OCCUPATIONAL THERAPY NOTE
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 due to underlying condition, limited to breakdown of skin ([address])  Active Problems:    Primary hypertension    Coronary artery disease involving native coronary artery of native heart without angina pectoris    S/P prostatectomy    Type 2 diabetes mellitus with diabetic polyneuropathy, with long-term current use of insulin (HCC)    Peripheral artery disease (HCC)    Stage 3b chronic kidney disease ([address])    Chronic HFpEF/Moderate pHTN (HFpEF) ([address])        11/20/23 1430   OT Last Visit   OT Visit Date 11/20/23   Note Type   Note Type Treatment   Pain Assessment   Pain Assessment Tool 0-10   Pain Score No Pain   Restrictions/Precautions   Weight Bearing Precautions Per Order Yes   RLE Weight Bearing Per Order WBAT   Braces or Orthoses   (heel unloading shoe)   Other Precautions Fall Risk;Pain; Chair Alarm; Bed Alarm;WBS   ADL   Where Assessed Chair   Grooming Assistance 5  Supervision/Setup   Grooming Deficit Steadying; Increased time to complete;Supervision/safety; Wash/dry hands; Wash/dry face   Toileting Assistance  3  Moderate Assistance   Toileting Deficit Steadying;Supervison/safety; Increased time to complete; Bedside commode   Transfers   Sit to Stand 3  Moderate assistance   Additional items Assist x 1; Increased time required;Verbal cues;Armrests; Other  (RW)   Stand to Sit 3  Moderate assistance   Additional items Assist x 1; Increased time required;Verbal cues; Other  (RW)   Stand pivot 3  Moderate assistance   Additional items Assist x 1; Increased time required;Verbal cues; Other  (RW)   Toilet transfer 3  Moderate assistance   Additional items Increased time required;Verbal cues; Commode; Other  (RW)   Cognition   Overall Cognitive Status WFL   Arousal/Participation Alert   Attention Within functional limits   Orientation Level Oriented X4   Memory Within functional limits   Following Commands Follows one step commands without difficulty   Activity Tolerance   Activity Tolerance Patient tolerated treatment well;Treatment limited secondary to medical complications (Comment)   Medical Staff Made Aware Yes   Assessment   Assessment Pt seen for OT f/u treatment session focusing on functional activity tolerance, ADLs, functional transfers/mobility, Safe transfer techniques, and OOB trial. Patient agreeable to OT treatment session, upon arrival patient was found seated in chair. Pt continues to make progress towards POC. Progressed from Ax2>Ax1 w/ functional transfers and able to pivot to [facility]. Requires modA w/ toileting tasks. Please refer to flowsheet for functional performance. Provided education on HEP, energy conservation techniques, deep breathing techniques, fall prevention strategies, and overall safety awareness. Patient requiring verbal cues for safety and verbal cues for pacing through activity steps. Patient continues to be functioning below baseline level, occupational performance remains limited secondary to factors listed above and increased risk for falls and injury. Pt seated OOB in chair with chair alarm activated at end of session. Call bell and phone within reach. All needs met and pt reports no further questions for OT at this time. Continue to recommend level I intensity resource when medically cleared. OT to follow pt on caseload to address deficits to facilitate return to PLOF. Plan   Treatment Interventions ADL retraining;Functional transfer training;UE strengthening/ROM; Endurance training;Patient/family training;Equipment evaluation/education; Compensatory technique education   Goal Expiration Date 12/02/23   OT Treatment Day 1   OT Frequency 3-5x/wk   Discharge Recommendation   Rehab Resource Intensity Level, OT I (Maximum Resource Intensity)   AM-PAC Daily Activity Inpatient   Lower Body Dressing 2   Bathing 2 Toileting 2   Upper Body Dressing 3   Grooming 3   Eating 4   Daily Activity Raw Score 16   Daily Activity Standardized Score (Calc for Raw Score >=11) 35.96     Rios Sanchez

## 2023-11-20 NOTE — QUICK NOTE
[address] [facility]  Quick Note    Subjective:  Called to bedside by staff for report of SOB. Per staff, lungs are clear B/L w/ O2 sat 96-98% on RA. HR 50-60bpm. Per pt, he feels "wound tight like a drum." He denies any CP. He reported indigestion earlier this morning after drinking OJ, which was relieved w/ TUMS. Otherwise, pt denies any further complaints at this time. Daughter at bedside; updated on pt's condition.     Plan:  -Lungs clear; O2 sat 97% on RA  -CBC, BMP, trop  -EKG   -Consult SLIM for medical management    Vitals:  /55   Pulse (!) 51   Temp 98.5 °F (36.9 °C)   Resp 20   Ht 6' 2" (1.88 m)   Wt 116 kg (255 lb 8.2 oz)   SpO2 97%   BMI 32.81 kg/m²     Imaging:  Pending    Physical Exam:    General appearance:  alert, oriented, anxious-appearing  Neurologic: Grossly normal  Head: Normocephalic, without obvious abnormality, atraumatic  Lungs: clear to auscultation bilaterally  Heart: regular rate and rhythm  Abdomen: soft, non-tender; bowel sounds normal; no masses,  no organomegaly      APOLLO Villafana  11/20/2023

## 2023-11-20 NOTE — CONSULTS
[address] [facility]  Consult  Name: Deejay Scott 78 y.o. male I MRN: [id number]  Unit/Bed#: [facility] 2 -02 I Date of Admission: 11/16/2023   Date of Service: 11/20/2023 I Hospital Day: 4    Inpatient consult to Internal Medicine  Consult performed by: John Medel MD  Consult ordered by: APOLLO Tamayo          Assessment/Plan   Acute respiratory distress  Assessment & Plan  Medicine team consulted by vascular surgery as patient experienced acute respiratory distress  On exam, patient was requiring 2 L nasal cannula and appeared mildly diaphoretic with complaints of nausea and anxiety  Family at bedside concern for anxiety/acute panic attack as patient with likely history of this in the past  Also concern for CHF given history of heart failure with preserved ejection fraction versus PE given recent surgery and immobility versus symptomatic anemia given reduced hemoglobin following femoropopliteal surgery versus ACS given history of CABG and coronary artery disease  Patient appears largely euvolemic with mild crackles in bilateral bases but no other signs of fluid overload  We will restart torsemide 20 mg daily starting 11/21; low concern for acute decompensated heart failure at this time  Some concern for PE, but breathing improved dramatically following Ativan administration; vital signs otherwise stable; consideration for obtaining D-dimer given, but patient just had surgery 4 days prior and would anticipate it being elevated nonetheless.   No indication for CTA now given recent IVY and low probability Wells score for pulmonary emboli  Some concern for symptomatic anemia as patient with normal hemoglobin labs recently; currently at 8.0  We will monitor hemoglobin on daily labs and transfuse for hemoglobin of 7 unless symptoms continue, in which case transfuse for hemoglobin of less than 8  COVID swab ordered for tomorrow morning; will move up to now to rule out possible COVID infection  Overall, patient symptoms have improved on subsequent exam; likely related to anxiety. Hydroxyzine ordered as needed to help control symptoms.       Chronic HFpEF/Moderate pHTN (HFpEF) ([facility])  Assessment & Plan  Wt Readings from Last 3 Encounters:   11/20/23 116 kg (255 lb 8.2 oz)   11/01/23 108 kg (239 lb)   10/31/23 108 kg (239 lb)     Medicine consulted as patient complaining of acute shortness of breath; was requiring oxygen (not on oxygen at baseline)  History of coronary artery disease and CHF  Recent echo with EF of 50% and grade 2 diastolic dysfunction  Confounding picture as patient with fine rales on bilateral bases; but pale conjunctive a and capillary refill of > 3 secs  Appears to be euvolemic; will restart oral torsemide 20 mg daily tomorrow  Low threshold to give IV Lasix if patient decompensates            Stage 3b chronic kidney disease [facility])  Assessment & Plan  Lab Results   Component Value Date    EGFR 57 11/20/2023    EGFR 43 11/17/2023    EGFR 41 11/16/2023    CREATININE 1.19 11/20/2023    CREATININE 1.49 (H) 11/17/2023    CREATININE 1.57 (H) 11/16/2023     IVY resolved; monitor renal function on daily labs  Avoid relative hypotension and nephrotoxic medications    Peripheral artery disease ([address])  Assessment & Plan  Continue treatment as per primary team    Type 2 diabetes mellitus with diabetic polyneuropathy, with long-term current use of insulin [facility])  Assessment & Plan  Lab Results   Component Value Date    HGBA1C 7.0 (H) 10/04/2023       Recent Labs     11/19/23  2046 11/20/23  0711 11/20/23  1121 11/20/23  1642   POCGLU 160* 162* 141* 125       Blood Sugar Average: Last 72 hrs:  (P) 143.5    SSI; Subcutaneous Insulin Order Set  Blood Glucose checks TIDWM and QHS (Q6H for NPO patients)  Hold oral medications  Blood Glucose goal while inpatient is 140-180  Reduce basal insulin by 25-50% while inpatient  Consistent Carbohydrate Diet      Coronary artery disease involving native coronary artery of native heart without angina pectoris  Assessment & Plan  Patient denies any chest pain; but did have acute episode of shortness of breath  Primary team obtained troponin to rule out ACS, however returned slightly elevated at 57 with repeat of 66  History of coronary artery disease with stenting and CABG; diabetes and hypertension  We will repeat troponin; EKG with no ST elevations or depressions  Monitor and consider consult cardiology    Primary hypertension  Assessment & Plan  Currently well controlled; continue amlodipine, metoprolol, Imdur    * Diabetic ulcer of right midfoot associated with diabetes mellitus due to underlying condition, limited to breakdown of skin [facility])  Assessment & Plan  Lab Results   Component Value Date    HGBA1C 7.0 (H) 10/04/2023       Recent Labs     11/19/23  2046 11/20/23  0711 11/20/23  1121 11/20/23  1642   POCGLU 160* 162* 141* 125       Blood Sugar Average: Last 72 hrs:  (P) 143.5             VTE Pharmacologic Prophylaxis:   Pharmacologic: Heparin  Mechanical VTE Prophylaxis in Place: No    Patient Centered Rounds: I have performed bedside rounds with nursing staff today. Discussions with Specialists or Other Care Team Provider: Vascular surgery    Education and Discussions with Family / Patient: Discussed treatment plan with family and patient who agree with current plan; encouraged to ask questions and participate. Time Spent for Care: 45 minutes. More than 50% of total time spent on counseling and coordination of care as described above. Current Length of Stay: 4 day(s)    Current Patient Status: Inpatient   Certification Statement: The patient will continue to require additional inpatient hospital stay due to treatment of peripheral vascular disease    Discharge Plan: As per primary team    Code Status: Level 3 - DNAR and DNI      Subjective:   Patient seen and examined bedside, initially found to be diaphoretic requiring supplemental oxygen. Work-up started as noted above; Ativan given. Several hours after Ativan administration, patient was off oxygen and appeared much better. Denies chest pain, shortness of breath, or any other acute sequela this evening. Continue work-up as noted above; monitor overnight and reexamine tomorrow morning. All rest as per primary team.    Objective:     Vitals:   Temp (24hrs), Av.2 °F (36.8 °C), Min:98 °F (36.7 °C), Max:98.5 °F (36.9 °C)    Temp:  [98 °F (36.7 °C)-98.5 °F (36.9 °C)] 98 °F (36.7 °C)  HR:  [51-63] 59  Resp:  [18-20] 18  BP: (122-145)/(53-62) 135/59  SpO2:  [93 %-97 %] 95 %  Body mass index is 32.81 kg/m². Input and Output Summary (last 24 hours): Intake/Output Summary (Last 24 hours) at 2023 1853  Last data filed at 2023 1801  Gross per 24 hour   Intake 1260 ml   Output 1750 ml   Net -490 ml       Physical Exam:     Physical Exam  Vitals and nursing note reviewed. Constitutional:       General: He is not in acute distress. Appearance: He is well-developed. HENT:      Head: Normocephalic and atraumatic. Eyes:      Conjunctiva/sclera: Conjunctivae normal.   Cardiovascular:      Rate and Rhythm: Normal rate and regular rhythm. Heart sounds: Murmur heard. Comments: Reports history of murmur in the past  Pulmonary:      Effort: Pulmonary effort is normal. No respiratory distress. Breath sounds: Rales present. Comments: Very fine bibasilar rales  Abdominal:      Palpations: Abdomen is soft. Tenderness: There is no abdominal tenderness. Musculoskeletal:         General: No swelling. Cervical back: Neck supple. Right lower leg: Edema present. Left lower leg: No edema. Comments: 2+ right lower extremity edema; no edema of left lower extremity   Skin:     General: Skin is warm and dry. Neurological:      Mental Status: He is alert and oriented to person, place, and time.    Psychiatric:         Mood and Affect: Mood normal. Additional Data:     Labs:    Results from last 7 days   Lab Units 11/20/23  1130 11/17/23  0537   WBC Thousand/uL 9.47 9.02   HEMOGLOBIN g/dL 8.0* 8.0*   HEMATOCRIT % 24.2* 25.7*   PLATELETS Thousands/uL 157 155   NEUTROS PCT %  --  71   LYMPHS PCT %  --  21   MONOS PCT %  --  7   EOS PCT %  --  1     Results from last 7 days   Lab Units 11/20/23  1130   SODIUM mmol/L 136   POTASSIUM mmol/L 4.0   CHLORIDE mmol/L 105   CO2 mmol/L 25   BUN mg/dL 26*   CREATININE mg/dL 1.19   ANION GAP mmol/L 6   CALCIUM mg/dL 8.3*   GLUCOSE RANDOM mg/dL 147*     Results from last 7 days   Lab Units 11/16/23  0727   INR  1.15     Results from last 7 days   Lab Units 11/20/23  1642 11/20/23  1121 11/20/23  0711 11/19/23  2046 11/19/23  1605 11/19/23  1128 11/19/23  0718 11/19/23  0130 11/18/23  2058 11/18/23  1630 11/18/23  1145 11/18/23  0733   POC GLUCOSE mg/dl 125 141* 162* 160* 125 145* 108 142* 152* 177* 129 141*         Results from last 7 days   Lab Units 11/20/23  1130 11/16/23  1518   LACTIC ACID mmol/L  --  1.0   PROCALCITONIN ng/ml 0.13  --            * I Have Reviewed All Lab Data Listed Above. * Additional Pertinent Lab Tests Reviewed:  All Labs Within Last 24 Hours Reviewed    Imaging:    Imaging Reports Reviewed Today Include:   Imaging Personally Reviewed by Myself Includes: Chest x-ray    Recent Cultures (last 7 days):           Last 24 Hours Medication List:   Current Facility-Administered Medications   Medication Dose Route Frequency Provider Last Rate    acetaminophen  650 mg Oral Q6H [address] APOLLO Osorio      amLODIPine  5 mg Oral Daily APOLLO Osorio      aspirin  81 mg Oral Daily Kit Dent, SANJUNP      atorvastatin  40 mg Oral QPM APOLLO Osorio      calcium carbonate  500 mg Oral TID PRN APOLLO Osorio      chlorhexidine  15 mL Mouth/Throat Q12H CONSTANTINE APOLLO Osorio      cloNIDine  0.1 mg Oral Q12H APOLLO Osorio      clopidogrel  75 mg Oral Daily APOLLO Osorio fenofibrate  145 mg Oral Daily Maria Isabel Squire, CRNP      gabapentin  600 mg Oral BID Maria Isabel Squire, CRNP      heparin (porcine)  5,000 Units Subcutaneous [facility] Maria Isabel Squire, CRNP      HYDROmorphone  0.5 mg Intravenous Q6H PRN Maria Isabel Squire, CRNP      hydrOXYzine HCL  25 mg Oral Q6H PRN Jewell Street MD      insulin glargine  15 Units Subcutaneous HS Maria Isabel Squire, CRNP      insulin lispro  1-6 Units Subcutaneous 4x Daily (AC & HS) Maria Isabel Squire, CRNP      isosorbide mononitrate  60 mg Oral Daily Maria Isabel Squire, CRNP      metoprolol tartrate  50 mg Oral Q12H [address] Maria Isabel Ryanire, SANJUNP      ondansetron  4 mg Intravenous Q4H PRN Maria Isabel Squire, CRNP      oxyCODONE  10 mg Oral Q4H PRN Maria Isabel Squire, CRNP      oxyCODONE  5 mg Oral Q4H PRN Maria Isabel Squire, CRNP      ranolazine  500 mg Oral BID APOLLO Seymour      [START ON 11/21/2023] torsemide  20 mg Oral Daily Jewell Street MD          Today, Patient Was Seen By: Tamica Dutton MD    ** Please Note: Dictation voice to text software may have been used in the creation of this document.  **

## 2023-11-20 NOTE — ASSESSMENT & PLAN NOTE
Patient denies any chest pain; but did have acute episode of shortness of breath  Primary team obtained troponin to rule out ACS, however returned slightly elevated at 57 with repeat of 66  History of coronary artery disease with stenting and CABG; diabetes and hypertension  We will repeat troponin; EKG with no ST elevations or depressions  Monitor and consider consult cardiology

## 2023-11-20 NOTE — ASSESSMENT & PLAN NOTE
77 yo male w/ a hx of remote smoker, DM II, HTN, CKD III, CAD, HLD, PAD, AAA S/P EVAR, dCHF and frequent PVCs presented to [facility] on 11/16/23 for a scheduled RLE bypass. Pt has a hx of PAD w/ a non-healing R heel wound. Pt had a RLE angiogram which showed a long segment occlusion of SFA, AT and PT. Decision was made to bypass to the peroneal artery, which is the only remaining patent tibial vessel. Pt underwent R CFA to peroneal artery bypass w/ reversed cryovein on 11/16/23. Podiatry consulted for non-healing R heel wound. Wound was debrided at bedside on 11/17 w/ plan for local wound care. Urology consulted re: pt's inability to void. Pt has a hx of a robot-assisted radical prostatectomy in 2015 w/ artificial urinary sphincter placement in 2017. Dyer placed post-procedure and was removed on 11/18 w/ condom catheter placed. Pt declined reactivation of his sphincter device at that time as he doesn't feel up to managing the device or damaging the cuff. See "S/P prostatectomy" plan for further details. Pt is not at baseline level of mobility and PT recommended rehab prior to returning home. Plan:  -RLE CLTI w/ tissue loss  -S/P R CFA to peroneal artery bypass w/ reversed cryovein on 11/16 by Dr. Lucia Duncan.   -R calf and groin incisions well-approx w/ no erythema, drainage or swelling. Tenderness to palpation to R calf region. RLE bypass (+) signal w/ audible DP/PT and peroneal signals. Motor and sensation grossly intact. 2+ R fem pulse.  -Continue neurovascular checks  -Activity as tolerated  -PT/OT consulted and recommend post acute rehab placement.   -Case management consulted for discharge planning; placement pending  -Urology following for hx of artificial urinary sphincter and inability to void; input appreciated. -Podiatry following for local wound care; input appreciated.  No plans for surgical intervention at this time.  -Continue ASA, plavix, lipitor for medical management of PAD  -Will discuss w/  Juancarlos Conway

## 2023-11-20 NOTE — PROGRESS NOTES
Patient:  Asuncion Zarco    MRN:  [id number]    Hain Request ID:  [id number]    Level of care reserved:    Partner Reserved:    Clinical needs requested:    Geography searched:  15 miles around [phone]    Start of Service:    Request sent:  4:05pm EST on 11/19/2023 by Karyle Gondola    Partner reserved:  by Eliseo Cooley    Choice list shared:  1:09pm EST on 11/20/2023 by Eliseo Cooley

## 2023-11-20 NOTE — ASSESSMENT & PLAN NOTE
Urology consulted re: pt's inability to void. Pt has a hx of a robot-assisted radical prostatectomy in 2015 w/ artificial urinary sphincter placement in 2017. Gilbert placed post-procedure and was removed on 11/18 w/ condom catheter placed. Pt declined reactivation of his sphincter device at that time as he doesn't feel up to managing the device or damaging the cuff. Condom cath will be used for incontinence until pt feels comfortable having device reactivated by urology. Plan:  -Continue condom catheter for incontinence  -Avoid routine bladder scans.  Bladder scan only if pt is experiencing acute urinary retention w/ pain  -If there are issues w/ urinary retention, contact urology for gilbert placement  -Plan to follow up w/ urology as an outpt

## 2023-11-20 NOTE — PLAN OF CARE
Problem: PAIN - ADULT  Goal: Verbalizes/displays adequate comfort level or baseline comfort level  Description: Interventions:  - Encourage patient to monitor pain and request assistance  - Assess pain using appropriate pain scale  - Administer analgesics based on type and severity of pain and evaluate response  - Implement non-pharmacological measures as appropriate and evaluate response  - Consider cultural and social influences on pain and pain management  - Notify physician/advanced practitioner if interventions unsuccessful or patient reports new pain  Outcome: Progressing     Problem: INFECTION - ADULT  Goal: Absence or prevention of progression during hospitalization  Description: INTERVENTIONS:  - Assess and monitor for signs and symptoms of infection  - Monitor lab/diagnostic results  - Monitor all insertion sites, i.e. indwelling lines, tubes, and drains  - Monitor endotracheal if appropriate and nasal secretions for changes in amount and color  - Touchet appropriate cooling/warming therapies per order  - Administer medications as ordered  - Instruct and encourage patient and family to use good hand hygiene technique  - Identify and instruct in appropriate isolation precautions for identified infection/condition  Outcome: Progressing  Goal: Absence of fever/infection during neutropenic period  Description: INTERVENTIONS:  - Monitor WBC    Outcome: Progressing     Problem: SAFETY ADULT  Goal: Patient will remain free of falls  Description: INTERVENTIONS:  - Educate patient/family on patient safety including physical limitations  - Instruct patient to call for assistance with activity   - Consult OT/PT to assist with strengthening/mobility   - Keep Call bell within reach  - Keep bed low and locked with side rails adjusted as appropriate  - Keep care items and personal belongings within reach  - Initiate and maintain comfort rounds  - Make Fall Risk Sign visible to staff  - Offer Toileting every 2 Hours, in advance of need  - Initiate/Maintain bed alarm  - Obtain necessary fall risk management equipment:   - Apply yellow socks and bracelet for high fall risk patients  - Consider moving patient to room near nurses station  Outcome: Progressing  Goal: Maintain or return to baseline ADL function  Description: INTERVENTIONS:  -  Assess patient's ability to carry out ADLs; assess patient's baseline for ADL function and identify physical deficits which impact ability to perform ADLs (bathing, care of mouth/teeth, toileting, grooming, dressing, etc.)  - Assess/evaluate cause of self-care deficits   - Assess range of motion  - Assess patient's mobility; develop plan if impaired  - Assess patient's need for assistive devices and provide as appropriate  - Encourage maximum independence but intervene and supervise when necessary  - Involve family in performance of ADLs  - Assess for home care needs following discharge   - Consider OT consult to assist with ADL evaluation and planning for discharge  - Provide patient education as appropriate  Outcome: Progressing  Goal: Maintains/Returns to pre admission functional level  Description: INTERVENTIONS:  - Perform AM-PAC 6 Click Basic Mobility/ Daily Activity assessment daily.  - Set and communicate daily mobility goal to care team and patient/family/caregiver. - Collaborate with rehabilitation services on mobility goals if consulted  - Perform Range of Motion 8 times a day. - Reposition patient every 2 hours.   - Dangle patient 3 times a day  - Stand patient 3 times a day  - Ambulate patient 3 times a day  - Out of bed to chair 3 times a day   - Out of bed for meals 3 times a day  - Out of bed for toileting  - Record patient progress and toleration of activity level   Outcome: Progressing     Problem: DISCHARGE PLANNING  Goal: Discharge to home or other facility with appropriate resources  Description: INTERVENTIONS:  - Identify barriers to discharge w/patient and caregiver  - Arrange for needed discharge resources and transportation as appropriate  - Identify discharge learning needs (meds, wound care, etc.)  - Arrange for interpretive services to assist at discharge as needed  - Refer to Case Management Department for coordinating discharge planning if the patient needs post-hospital services based on physician/advanced practitioner order or complex needs related to functional status, cognitive ability, or social support system  Outcome: Progressing     Problem: Knowledge Deficit  Goal: Patient/family/caregiver demonstrates understanding of disease process, treatment plan, medications, and discharge instructions  Description: Complete learning assessment and assess knowledge base.   Interventions:  - Provide teaching at level of understanding  - Provide teaching via preferred learning methods  Outcome: Progressing     Problem: HEMATOLOGIC - ADULT  Goal: Maintains hematologic stability  Description: INTERVENTIONS  - Assess for signs and symptoms of bleeding or hemorrhage  - Monitor labs  - Administer supportive blood products/factors as ordered and appropriate  Outcome: Progressing     Problem: Prexisting or High Potential for Compromised Skin Integrity  Goal: Skin integrity is maintained or improved  Description: INTERVENTIONS:  - Identify patients at risk for skin breakdown  - Assess and monitor skin integrity  - Assess and monitor nutrition and hydration status  - Monitor labs   - Assess for incontinence   - Turn and reposition patient  - Assist with mobility/ambulation  - Relieve pressure over bony prominences  - Avoid friction and shearing  - Provide appropriate hygiene as needed including keeping skin clean and dry  - Evaluate need for skin moisturizer/barrier cream  - Collaborate with interdisciplinary team   - Patient/family teaching  - Consider wound care consult   Outcome: Progressing     Problem: GASTROINTESTINAL - ADULT  Goal: Maintains adequate nutritional intake  Description: INTERVENTIONS:  - Monitor percentage of each meal consumed  - Identify factors contributing to decreased intake, treat as appropriate  - Assist with meals as needed  - Monitor I&O, weight, and lab values if indicated  - Obtain nutrition services referral as needed  Outcome: Progressing     Problem: METABOLIC, FLUID AND ELECTROLYTES - ADULT  Goal: Glucose maintained within target range  Description: INTERVENTIONS:  - Monitor Blood Glucose as ordered  - Assess for signs and symptoms of hyperglycemia and hypoglycemia  - Administer ordered medications to maintain glucose within target range  - Assess nutritional intake and initiate nutrition service referral as needed  Outcome: Progressing     Problem: SKIN/TISSUE INTEGRITY - ADULT  Goal: Incision(s), wounds(s) or drain site(s) healing without S/S of infection  Description: INTERVENTIONS  - Assess and document dressing, incision, wound bed, drain sites and surrounding tissue  - Provide patient and family education  - Perform skin care/dressing changes   Outcome: Progressing     Problem: MUSCULOSKELETAL - ADULT  Goal: Maintain or return mobility to safest level of function  Description: INTERVENTIONS:  - Assess patient's ability to carry out ADLs; assess patient's baseline for ADL function and identify physical deficits which impact ability to perform ADLs (bathing, care of mouth/teeth, toileting, grooming, dressing, etc.)  - Assess/evaluate cause of self-care deficits   - Assess range of motion  - Assess patient's mobility  - Assess patient's need for assistive devices and provide as appropriate  - Encourage maximum independence but intervene and supervise when necessary  - Involve family in performance of ADLs  - Assess for home care needs following discharge   - Consider OT consult to assist with ADL evaluation and planning for discharge  - Provide patient education as appropriate  Outcome: Progressing     Problem: NEUROSENSORY - ADULT  Goal: Achieves maximal functionality and self care  Description: INTERVENTIONS  - Monitor swallowing and airway patency with patient fatigue and changes in neurological status  - Encourage and assist patient to increase activity and self care.    - Encourage visually impaired, hearing impaired and aphasic patients to use assistive/communication devices  Outcome: Progressing

## 2023-11-20 NOTE — ASSESSMENT & PLAN NOTE
Wt Readings from Last 3 Encounters:   11/20/23 116 kg (255 lb 8.2 oz)   11/01/23 108 kg (239 lb)   10/31/23 108 kg (239 lb)     Medicine consulted as patient complaining of acute shortness of breath; was requiring oxygen (not on oxygen at baseline)  History of coronary artery disease and CHF  Recent echo with EF of 50% and grade 2 diastolic dysfunction  Confounding picture as patient with fine rales on bilateral bases; but pale conjunctive a and capillary refill of > 3 secs  Appears to be euvolemic; will restart oral torsemide 20 mg daily tomorrow  Low threshold to give IV Lasix if patient decompensates

## 2023-11-20 NOTE — ASSESSMENT & PLAN NOTE
Lab Results   Component Value Date    HGBA1C 7.0 (H) 10/04/2023       Recent Labs     11/19/23  2046 11/20/23  0711 11/20/23  1121 11/20/23  1642   POCGLU 160* 162* 141* 125       Blood Sugar Average: Last 72 hrs:  (P) 143.5

## 2023-11-20 NOTE — ASSESSMENT & PLAN NOTE
Constant observer at bedside; patient resting.      Fariba Cervantes RN  09/17/23 0231 Lab Results   Component Value Date    EGFR 43 11/17/2023    EGFR 41 11/16/2023    EGFR 32 10/24/2023    CREATININE 1.49 (H) 11/17/2023    CREATININE 1.57 (H) 11/16/2023    CREATININE 1.89 (H) 10/24/2023     Plan:  -Avoid nephrotoxic medications  -Use renal dosing as appropriate

## 2023-11-20 NOTE — ASSESSMENT & PLAN NOTE
Lab Results   Component Value Date    HGBA1C 7.0 (H) 10/04/2023       Recent Labs     11/19/23  2046 11/20/23  0711 11/20/23  1121 11/20/23  1642   POCGLU 160* 162* 141* 125       Blood Sugar Average: Last 72 hrs:  (P) 143.5    SSI; Subcutaneous Insulin Order Set  Blood Glucose checks TIDWM and QHS (Q6H for NPO patients)  Hold oral medications  Blood Glucose goal while inpatient is 140-180  Reduce basal insulin by 25-50% while inpatient  Consistent Carbohydrate Diet

## 2023-11-20 NOTE — CASE MANAGEMENT
[address] received request for authorization from Care Manager.   Authorization request for: SNF  Facility Name: [facility]  NZZ:[id number]   Facility MD: Dr Riley Mcnamara      NPI: [id number]   Authorization initiated by contacting insurance: Alessandra Guzmán Via: Av[employer]   Pending Reference #:[id number]  Clinicals submitted via: [employer] attachment

## 2023-11-20 NOTE — ASSESSMENT & PLAN NOTE
Medicine team consulted by vascular surgery as patient experienced acute respiratory distress  On exam, patient was requiring 2 L nasal cannula and appeared mildly diaphoretic with complaints of nausea and anxiety  Family at bedside concern for anxiety/acute panic attack as patient with likely history of this in the past  Also concern for CHF given history of heart failure with preserved ejection fraction versus PE given recent surgery and immobility versus symptomatic anemia given reduced hemoglobin following femoropopliteal surgery versus ACS given history of CABG and coronary artery disease  Patient appears largely euvolemic with mild crackles in bilateral bases but no other signs of fluid overload  We will restart torsemide 20 mg daily starting 11/21; low concern for acute decompensated heart failure at this time  Some concern for PE, but breathing improved dramatically following Ativan administration; vital signs otherwise stable; consideration for obtaining D-dimer given, but patient just had surgery 4 days prior and would anticipate it being elevated nonetheless. No indication for CTA now given recent IVY and low probability Wells score for pulmonary emboli  Some concern for symptomatic anemia as patient with normal hemoglobin labs recently; currently at 8.0  We will monitor hemoglobin on daily labs and transfuse for hemoglobin of 7 unless symptoms continue, in which case transfuse for hemoglobin of less than 8  COVID swab ordered for tomorrow morning; will move up to now to rule out possible COVID infection  Overall, patient symptoms have improved on subsequent exam; likely related to anxiety. Hydroxyzine ordered as needed to help control symptoms.

## 2023-11-20 NOTE — PLAN OF CARE
Problem: OCCUPATIONAL THERAPY ADULT  Goal: Performs self-care activities at highest level of function for planned discharge setting. See evaluation for individualized goals. Description: Treatment Interventions: ADL retraining, Functional transfer training, UE strengthening/ROM, Endurance training, Patient/family training, Equipment evaluation/education, Compensatory technique education          See flowsheet documentation for full assessment, interventions and recommendations. Outcome: Progressing  Note: Limitation: Decreased ADL status, Decreased UE strength, Decreased Safe judgement during ADL, Decreased endurance, Decreased high-level ADLs  Prognosis: Good  Assessment: Pt seen for OT f/u treatment session focusing on functional activity tolerance, ADLs, functional transfers/mobility, Safe transfer techniques, and OOB trial. Patient agreeable to OT treatment session, upon arrival patient was found seated in chair. Pt continues to make progress towards POC. Progressed from Ax2>Ax1 w/ functional transfers and able to pivot to [facility]. Requires modA w/ toileting tasks. Please refer to flowsheet for functional performance. Provided education on HEP, energy conservation techniques, deep breathing techniques, fall prevention strategies, and overall safety awareness. Patient requiring verbal cues for safety and verbal cues for pacing through activity steps. Patient continues to be functioning below baseline level, occupational performance remains limited secondary to factors listed above and increased risk for falls and injury. Pt seated OOB in chair with chair alarm activated at end of session. Call bell and phone within reach. All needs met and pt reports no further questions for OT at this time. Continue to recommend level I intensity resource when medically cleared. OT to follow pt on caseload to address deficits to facilitate return to PLOF.      Rehab Resource Intensity Level, OT: I (Maximum Resource Intensity)

## 2023-11-20 NOTE — CASE MANAGEMENT
Case Management Discharge Planning Note    Patient name Asuncion Zarco  [facility] Mallory Mondragon* MRN [id number]  : 1945 Date 2023       Current Admission Date: 2023  Current Admission Diagnosis:Diabetic ulcer of right midfoot associated with diabetes mellitus due to underlying condition, limited to breakdown of skin [facility]   Patient Active Problem List    Diagnosis Date Noted    Frequent PVCs 2023    Acute on chronic diastolic heart failure ([address]) 2023    Diabetic ulcer of right midfoot associated with diabetes mellitus due to underlying condition, limited to breakdown of skin ([address]) 06/15/2023    Hypertensive urgency 2023    Elevated troponin level not due myocardial infarction 2023    Stable angina pectoris 2023    Chronic kidney disease-mineral and bone disorder 2022    Mild aortic stenosis 2022    Chronic HFpEF/Moderate pHTN (HFpEF) ([address]) 11/10/2022    Gross hematuria 2022    Platelets decreased ([address]) 2022    Stage 3b chronic kidney disease ([address]) 2022    Actinic keratoses 2022    Type 2 diabetes mellitus with diabetic peripheral angiopathy without gangrene, with long-term current use of insulin ([address]) 2022    Varicose veins of left lower extremity 2021    History of endovascular stent graft for abdominal aortic aneurysm (AAA) 2021    Bruit (arterial) 2021    Peripheral artery disease ([address]) 2021    Type 2 diabetes mellitus with diabetic polyneuropathy, with long-term current use of insulin ([address]) 06/10/2021    Mixed hyperlipidemia 2021    Primary hypertension 2021    Coronary artery disease involving native coronary artery of native heart without angina pectoris 2021    S/P angioplasty with stent 2021    Hx of CABG 2021    S/P AAA repair 2021    S/P prostatectomy 2021    H/O prostate cancer 2021      LOS (days): 4  Geometric Mean LOS (GMLOS) (days): 3.80  Days to GMLOS:-0.2     OBJECTIVE:  Risk of Unplanned Readmission Score: 19.29         Current admission status: Inpatient   Preferred Pharmacy:   [facility] #[id number] [place] [address]  Phone: [phone] Fax: [phone]    [facility] ([address]  [address] [facility] [address]  Phone: [phone] Fax: [phone] [address] [facility], [address] [facility]  [address] [facility]  Phone: [phone] Fax: [phone]    Primary Care Provider: Charley Altamirano DO    Primary Insurance: Maxim Tolentino [facility] REP  Secondary Insurance:     DISCHARGE DETAILS:                                                    Treatment Team Recommendation: Short Term Rehab  Discharge Destination Plan[de-identified] SNF, Short Term Rehab ([facility])  Transport at Discharge : BLS Ambulance                                      Additional Comments: [facility], pt/family 1st choice, able to accept for STR- Cindy from facility stated while they are OON w/ insurance, pt DOES have OON benefits which have already been discussed with the Dtr.   Request to CM Discharge Support made to initiate auth this day for [facility] 11/21/23

## 2023-11-20 NOTE — PLAN OF CARE
Problem: PAIN - ADULT  Goal: Verbalizes/displays adequate comfort level or baseline comfort level  Description: Interventions:  - Encourage patient to monitor pain and request assistance  - Assess pain using appropriate pain scale  - Administer analgesics based on type and severity of pain and evaluate response  - Implement non-pharmacological measures as appropriate and evaluate response  - Consider cultural and social influences on pain and pain management  - Notify physician/advanced practitioner if interventions unsuccessful or patient reports new pain  Outcome: Progressing     Problem: INFECTION - ADULT  Goal: Absence or prevention of progression during hospitalization  Description: INTERVENTIONS:  - Assess and monitor for signs and symptoms of infection  - Monitor lab/diagnostic results  - Monitor all insertion sites, i.e. indwelling lines, tubes, and drains  - Monitor endotracheal if appropriate and nasal secretions for changes in amount and color  - Smilax appropriate cooling/warming therapies per order  - Administer medications as ordered  - Instruct and encourage patient and family to use good hand hygiene technique  - Identify and instruct in appropriate isolation precautions for identified infection/condition  Outcome: Progressing  Goal: Absence of fever/infection during neutropenic period  Description: INTERVENTIONS:  - Monitor WBC    Outcome: Progressing     Problem: SAFETY ADULT  Goal: Patient will remain free of falls  Description: INTERVENTIONS:  - Educate patient/family on patient safety including physical limitations  - Instruct patient to call for assistance with activity   - Consult OT/PT to assist with strengthening/mobility   - Keep Call bell within reach  - Keep bed low and locked with side rails adjusted as appropriate  - Keep care items and personal belongings within reach  - Initiate and maintain comfort rounds  - Make Fall Risk Sign visible to staff  - Offer Toileting every 2 Hours, in advance of need  - Initiate/Maintain bed alarm  - Obtain necessary fall risk management equipment:   - Apply yellow socks and bracelet for high fall risk patients  - Consider moving patient to room near nurses station  Outcome: Progressing  Goal: Maintain or return to baseline ADL function  Description: INTERVENTIONS:  -  Assess patient's ability to carry out ADLs; assess patient's baseline for ADL function and identify physical deficits which impact ability to perform ADLs (bathing, care of mouth/teeth, toileting, grooming, dressing, etc.)  - Assess/evaluate cause of self-care deficits   - Assess range of motion  - Assess patient's mobility; develop plan if impaired  - Assess patient's need for assistive devices and provide as appropriate  - Encourage maximum independence but intervene and supervise when necessary  - Involve family in performance of ADLs  - Assess for home care needs following discharge   - Consider OT consult to assist with ADL evaluation and planning for discharge  - Provide patient education as appropriate  Outcome: Progressing  Goal: Maintains/Returns to pre admission functional level  Description: INTERVENTIONS:  - Perform AM-PAC 6 Click Basic Mobility/ Daily Activity assessment daily.  - Set and communicate daily mobility goal to care team and patient/family/caregiver. - Collaborate with rehabilitation services on mobility goals if consulted  - Perform Range of Motion 8 times a day. - Reposition patient every 2 hours.   - Dangle patient 3 times a day  - Stand patient 3 times a day  - Ambulate patient 3 times a day  - Out of bed to chair 3 times a day   - Out of bed for meals 3 times a day  - Out of bed for toileting  - Record patient progress and toleration of activity level   Outcome: Progressing     Problem: DISCHARGE PLANNING  Goal: Discharge to home or other facility with appropriate resources  Description: INTERVENTIONS:  - Identify barriers to discharge w/patient and caregiver  - Arrange for needed discharge resources and transportation as appropriate  - Identify discharge learning needs (meds, wound care, etc.)  - Arrange for interpretive services to assist at discharge as needed  - Refer to Case Management Department for coordinating discharge planning if the patient needs post-hospital services based on physician/advanced practitioner order or complex needs related to functional status, cognitive ability, or social support system  Outcome: Progressing     Problem: Knowledge Deficit  Goal: Patient/family/caregiver demonstrates understanding of disease process, treatment plan, medications, and discharge instructions  Description: Complete learning assessment and assess knowledge base.   Interventions:  - Provide teaching at level of understanding  - Provide teaching via preferred learning methods  Outcome: Progressing     Problem: HEMATOLOGIC - ADULT  Goal: Maintains hematologic stability  Description: INTERVENTIONS  - Assess for signs and symptoms of bleeding or hemorrhage  - Monitor labs  - Administer supportive blood products/factors as ordered and appropriate  Outcome: Progressing     Problem: GASTROINTESTINAL - ADULT  Goal: Maintains adequate nutritional intake  Description: INTERVENTIONS:  - Monitor percentage of each meal consumed  - Identify factors contributing to decreased intake, treat as appropriate  - Assist with meals as needed  - Monitor I&O, weight, and lab values if indicated  - Obtain nutrition services referral as needed  Outcome: Progressing     Problem: METABOLIC, FLUID AND ELECTROLYTES - ADULT  Goal: Glucose maintained within target range  Description: INTERVENTIONS:  - Monitor Blood Glucose as ordered  - Assess for signs and symptoms of hyperglycemia and hypoglycemia  - Administer ordered medications to maintain glucose within target range  - Assess nutritional intake and initiate nutrition service referral as needed  Outcome: Progressing     Problem: SKIN/TISSUE INTEGRITY - ADULT  Goal: Incision(s), wounds(s) or drain site(s) healing without S/S of infection  Description: INTERVENTIONS  - Assess and document dressing, incision, wound bed, drain sites and surrounding tissue  - Provide patient and family education  - Perform skin care/dressing changes every day  Outcome: Progressing     Problem: MUSCULOSKELETAL - ADULT  Goal: Maintain or return mobility to safest level of function  Description: INTERVENTIONS:  - Assess patient's ability to carry out ADLs; assess patient's baseline for ADL function and identify physical deficits which impact ability to perform ADLs (bathing, care of mouth/teeth, toileting, grooming, dressing, etc.)  - Assess/evaluate cause of self-care deficits   - Assess range of motion  - Assess patient's mobility  - Assess patient's need for assistive devices and provide as appropriate  - Encourage maximum independence but intervene and supervise when necessary  - Involve family in performance of ADLs  - Assess for home care needs following discharge   - Consider OT consult to assist with ADL evaluation and planning for discharge  - Provide patient education as appropriate  Outcome: Progressing     Problem: Prexisting or High Potential for Compromised Skin Integrity  Goal: Skin integrity is maintained or improved  Description: INTERVENTIONS:  - Identify patients at risk for skin breakdown  - Assess and monitor skin integrity  - Assess and monitor nutrition and hydration status  - Monitor labs   - Assess for incontinence   - Turn and reposition patient  - Assist with mobility/ambulation  - Relieve pressure over bony prominences  - Avoid friction and shearing  - Provide appropriate hygiene as needed including keeping skin clean and dry  - Evaluate need for skin moisturizer/barrier cream  - Collaborate with interdisciplinary team   - Patient/family teaching  - Consider wound care consult   Outcome: Progressing   day

## 2023-11-20 NOTE — ASSESSMENT & PLAN NOTE
Lab Results   Component Value Date    EGFR 57 11/20/2023    EGFR 43 11/17/2023    EGFR 41 11/16/2023    CREATININE 1.19 11/20/2023    CREATININE 1.49 (H) 11/17/2023    CREATININE 1.57 (H) 11/16/2023     IVY resolved; monitor renal function on daily labs  Avoid relative hypotension and nephrotoxic medications

## 2023-11-20 NOTE — PROGRESS NOTES
[facility]  Progress Note  Name: Cinda Ferrell  MRN: [id number]  Unit/Bed#: Ellie Garrett -02 I Date of Admission: 11/16/2023   Date of Service: 11/20/2023 I Hospital Day: 4    Assessment/Plan   Peripheral artery disease [facility])  Assessment & Plan  77 yo male w/ a hx of remote smoker, DM II, HTN, CKD III, CAD, HLD, PAD, AAA S/P EVAR, dCHF and frequent PVCs presented to [facility] on 11/16/23 for a scheduled RLE bypass. Pt has a hx of PAD w/ a non-healing R heel wound. Pt had a RLE angiogram which showed a long segment occlusion of SFA, AT and PT. Decision was made to bypass to the peroneal artery, which is the only remaining patent tibial vessel. Pt underwent R CFA to peroneal artery bypass w/ reversed cryovein on 11/16/23. Podiatry consulted for non-healing R heel wound. Wound was debrided at bedside on 11/17 w/ plan for local wound care. Urology consulted re: pt's inability to void. Pt has a hx of a robot-assisted radical prostatectomy in 2015 w/ artificial urinary sphincter placement in 2017. Dyer placed post-procedure and was removed on 11/18 w/ condom catheter placed. Pt declined reactivation of his sphincter device at that time as he doesn't feel up to managing the device or damaging the cuff. See "S/P prostatectomy" plan for further details. Pt is not at baseline level of mobility and PT recommended rehab prior to returning home. Plan:  -RLE CLTI w/ tissue loss  -S/P R CFA to peroneal artery bypass w/ reversed cryovein on 11/16 by Dr. Erica Cordero.   -R calf and groin incisions well-approx w/ no erythema, drainage or swelling. Tenderness to palpation to R calf region. RLE bypass (+) signal w/ audible DP/PT and peroneal signals. Motor and sensation grossly intact.  2+ R fem pulse.  -Continue neurovascular checks  -Activity as tolerated  -PT/OT consulted and recommend post acute rehab placement.   -Case management consulted for discharge planning; placement pending  -Urology following for hx of artificial urinary sphincter and inability to void; input appreciated. -Podiatry following for local wound care; input appreciated. No plans for surgical intervention at this time.  -Continue ASA, plavix, lipitor for medical management of PAD  -Will discuss w/ Dr. Roe Cardenas 3b chronic kidney disease [facility])  Assessment & Plan  Lab Results   Component Value Date    EGFR 43 11/17/2023    EGFR 41 11/16/2023    EGFR 32 10/24/2023    CREATININE 1.49 (H) 11/17/2023    CREATININE 1.57 (H) 11/16/2023    CREATININE 1.89 (H) 10/24/2023     Plan:  -Avoid nephrotoxic medications  -Use renal dosing as appropriate    S/P prostatectomy  Assessment & Plan  Urology consulted re: pt's inability to void. Pt has a hx of a robot-assisted radical prostatectomy in 2015 w/ artificial urinary sphincter placement in 2017. Gilbert placed post-procedure and was removed on 11/18 w/ condom catheter placed. Pt declined reactivation of his sphincter device at that time as he doesn't feel up to managing the device or damaging the cuff. Condom cath will be used for incontinence until pt feels comfortable having device reactivated by urology. Plan:  -Continue condom catheter for incontinence  -Avoid routine bladder scans. Bladder scan only if pt is experiencing acute urinary retention w/ pain  -If there are issues w/ urinary retention, contact urology for gilbert placement  -Plan to follow up w/ urology as an outpt    Coronary artery disease involving native coronary artery of native heart without angina pectoris  Assessment & Plan  Plan:  -Monitor BP qshift and prn  Continue ASA, plavix, lopressor, lipitor, ranolazine and imdur    Primary hypertension  Assessment & Plan  Plan:  -Well-controlled  -Monitor BP qshift and prn  -Continue home antihypertensive regimen      Subjective:  Pt seen for exam while sitting upright in bed; NAD. Pt reports a significant decrease in RLE pain.  He denies any pain when in bed, and reports "soreness" in RLE when attempting to ambulate. Pt also admits to tenderness on palpation of R groin and R medial calf incision sites. Per documentation, prn hydromorphone not used since 11/17. Pt is using prn oxycodone 1-3 times per day and receives routine tylenol and neurontin. Otherwise, pt denies any further complaints at this time. Vitals:  /62   Pulse 56   Temp 98.5 °F (36.9 °C)   Resp 20   Ht 6' 2" (1.88 m)   Wt 116 kg (255 lb 8.2 oz)   SpO2 93%   BMI 32.81 kg/m²     I/Os:  I/O last 3 completed shifts:  In: -   Out: 1375 [Urine:1375]  No intake/output data recorded.     Lab Results and Cultures:   Lab Results   Component Value Date    WBC 9.02 11/17/2023    HGB 8.0 (L) 11/17/2023    HCT 25.7 (L) 11/17/2023    MCV 89 11/17/2023     11/17/2023     Lab Results   Component Value Date    GLUCOSE 159 (H) 11/16/2023    CALCIUM 7.4 (L) 11/17/2023    K 3.8 11/17/2023    CO2 24 11/17/2023     (H) 11/17/2023    BUN 29 (H) 11/17/2023    CREATININE 1.49 (H) 11/17/2023     Lab Results   Component Value Date    INR 1.15 11/16/2023    INR 1.10 10/24/2023    INR 1.26 (H) 06/17/2023    PROTIME 14.9 (H) 11/16/2023    PROTIME 14.1 10/24/2023    PROTIME 15.9 (H) 06/17/2023       Medications:  Current Facility-Administered Medications   Medication Dose Route Frequency    acetaminophen (TYLENOL) tablet 650 mg  650 mg Oral Q6H CONSTANTINE    amLODIPine (NORVASC) tablet 5 mg  5 mg Oral Daily    aspirin chewable tablet 81 mg  81 mg Oral Daily    atorvastatin (LIPITOR) tablet 40 mg  40 mg Oral QPM    calcium carbonate (TUMS) chewable tablet 500 mg  500 mg Oral TID PRN    chlorhexidine (PERIDEX) 0.12 % oral rinse 15 mL  15 mL Mouth/Throat Q12H CONSTANTINE    cloNIDine (CATAPRES) tablet 0.1 mg  0.1 mg Oral Q12H    clopidogrel (PLAVIX) tablet 75 mg  75 mg Oral Daily    fenofibrate (TRICOR) tablet 145 mg  145 mg Oral Daily    gabapentin (NEURONTIN) capsule 600 mg  600 mg Oral BID    heparin (porcine) subcutaneous injection 5,000 Units  5,000 Units Subcutaneous Q8H [facility]    HYDROmorphone (DILAUDID) injection 0.5 mg  0.5 mg Intravenous Q6H PRN    insulin glargine (LANTUS) subcutaneous injection 15 Units 0.15 mL  15 Units Subcutaneous HS    insulin lispro (HumaLOG) 100 units/mL subcutaneous injection 1-6 Units  1-6 Units Subcutaneous 4x Daily (AC & HS)    isosorbide mononitrate (IMDUR) 24 hr tablet 60 mg  60 mg Oral Daily    metoprolol tartrate (LOPRESSOR) tablet 50 mg  50 mg Oral Q12H CONSTANTINE    ondansetron (ZOFRAN) injection 4 mg  4 mg Intravenous Q4H PRN    oxyCODONE (ROXICODONE) immediate release tablet 10 mg  10 mg Oral Q4H PRN    oxyCODONE (ROXICODONE) IR tablet 5 mg  5 mg Oral Q4H PRN    ranolazine (RANEXA) 12 hr tablet 500 mg  500 mg Oral BID       Physical Exam:    General appearance: alert and oriented, in no acute distress  Skin:  skin color, texture, turgor normal; no rashes  Neurologic: Grossly normal  Head: Normocephalic, without obvious abnormality, atraumatic  Lungs: clear to auscultation bilaterally  Heart: regular rate and rhythm  Abdomen: soft, non-tender; bowel sounds normal; no masses,  no organomegaly  Extremities:  extremities normal, warm; no clubbing or cyanosis; mild RLE edema    Wound/Incision:  R groin incision well-approx, no erythema or drainage, surgical glue present; ELVIA. R medial calf incision well-approx, no drainage, erythema or swelling; staples intact. Area painted w/ betadine and redressed w/ 4x4 and kerlex. R plantar heel wound w/ drsg dry and intact.        R groin      R medial calf    Pulse exam:  R bypass signal  Peroneal: right: doppler signal  DP: Right: doppler signal Left: doppler signal  PT: Right: doppler signal Left: doppler signal      APOLLO Dukes  11/20/2023

## 2023-11-20 NOTE — CASE MANAGEMENT
Case Management Discharge Planning Note    Patient name Graciela Mccormick  [facility] 2 /[facility] 2 Falguni Torres MRN [id number]  : 1945 Date 2023       Current Admission Date: 2023  Current Admission Diagnosis:Diabetic ulcer of right midfoot associated with diabetes mellitus due to underlying condition, limited to breakdown of skin [facility]   Patient Active Problem List    Diagnosis Date Noted    Frequent PVCs 2023    Acute on chronic diastolic heart failure ([address]) 2023    Diabetic ulcer of right midfoot associated with diabetes mellitus due to underlying condition, limited to breakdown of skin ([address]) 06/15/2023    Hypertensive urgency 2023    Elevated troponin level not due myocardial infarction 2023    Stable angina pectoris 2023    Chronic kidney disease-mineral and bone disorder 2022    Mild aortic stenosis 2022    Chronic HFpEF/Moderate pHTN (HFpEF) ([address]) 11/10/2022    Gross hematuria 2022    Platelets decreased ([address]) 2022    Stage 3b chronic kidney disease ([address]) 2022    Actinic keratoses 2022    Type 2 diabetes mellitus with diabetic peripheral angiopathy without gangrene, with long-term current use of insulin ([address]) 2022    Varicose veins of left lower extremity 2021    History of endovascular stent graft for abdominal aortic aneurysm (AAA) 2021    Bruit (arterial) 2021    Peripheral artery disease ([address]) 2021    Type 2 diabetes mellitus with diabetic polyneuropathy, with long-term current use of insulin ([address]) 06/10/2021    Mixed hyperlipidemia 2021    Primary hypertension 2021    Coronary artery disease involving native coronary artery of native heart without angina pectoris 2021    S/P angioplasty with stent 2021    Hx of CABG 2021    S/P AAA repair 2021    S/P prostatectomy 2021    H/O prostate cancer 2021      LOS (days): 4  Geometric Mean LOS (GMLOS) (days): 3.80  Days to GMLOS:0     OBJECTIVE:  Risk of Unplanned Readmission Score: 13.41         Current admission status: Inpatient   Preferred Pharmacy:   [id number] [place] [address]  Phone: [phone] Fax: [phone]    [facility] ([address] [facility]  [address]  [address] [facility] [address]  Phone: [phone] Fax: [phone] [address] [facility], [address] [facility]  [address] [facility]  Phone: [phone] Fax: [phone]    Primary Care Provider: Douglas Perez DO    Primary Insurance: [employer] [facility] [employer]  Secondary Insurance:     DISCHARGE DETAILS:    Discharge planning discussed with[de-identified] Pt's daughter Marilee Acuña of Choice: Yes  Comments - Freedom of Choice: Agreeable to STR and would like him to go to [facility] as she works there and the facility is aware of him- referral sent with call to admissions to ascertain if able to accept- will need authorization.   CM contacted family/caregiver?: Yes  Were Treatment Team discharge recommendations reviewed with patient/caregiver?: Yes  Did patient/caregiver verbalize understanding of patient care needs?: Yes       Contacts  Patient Contacts: Kerrie Hercules Dtr  Relationship to Patient[de-identified] Family  Contact Method: Phone  Phone Number: [phone]  Reason/Outcome: Referral, Discharge [address] [facility]         Is the patient interested in [facility] at discharge?: No    DME Referral Provided  Referral made for DME?: No         Would you like to participate in our [address] [facility] [employer] service program?  : No - Declined    Treatment Team Recommendation: Short Term Rehab  Discharge Destination Plan[de-identified] Short Term Rehab (Hoping for STR in [place] (specifically for [facility]))  Transport at Discharge : BLS Ambulance IMM Given (Date):: 11/20/23  IMM Given to[de-identified] Family (Dtr via phone)

## 2023-11-21 VITALS
HEART RATE: 66 BPM | RESPIRATION RATE: 18 BRPM | BODY MASS INDEX: 32.74 KG/M2 | WEIGHT: 255.07 LBS | OXYGEN SATURATION: 96 % | SYSTOLIC BLOOD PRESSURE: 115 MMHG | HEIGHT: 74 IN | TEMPERATURE: 98.1 F | DIASTOLIC BLOOD PRESSURE: 51 MMHG

## 2023-11-21 LAB
ALBUMIN SERPL BCP-MCNC: 3.2 G/DL (ref 3.5–5)
ALP SERPL-CCNC: 38 U/L (ref 34–104)
ALT SERPL W P-5'-P-CCNC: 12 U/L (ref 7–52)
ANION GAP SERPL CALCULATED.3IONS-SCNC: 6 MMOL/L
AST SERPL W P-5'-P-CCNC: 19 U/L (ref 13–39)
BASOPHILS # BLD AUTO: 0.02 THOUSANDS/ÂΜL (ref 0–0.1)
BASOPHILS NFR BLD AUTO: 0 % (ref 0–1)
BILIRUB SERPL-MCNC: 0.66 MG/DL (ref 0.2–1)
BUN SERPL-MCNC: 26 MG/DL (ref 5–25)
CALCIUM ALBUM COR SERPL-MCNC: 9.1 MG/DL (ref 8.3–10.1)
CALCIUM SERPL-MCNC: 8.5 MG/DL (ref 8.4–10.2)
CHLORIDE SERPL-SCNC: 105 MMOL/L (ref 96–108)
CO2 SERPL-SCNC: 25 MMOL/L (ref 21–32)
CREAT SERPL-MCNC: 1.26 MG/DL (ref 0.6–1.3)
EOSINOPHIL # BLD AUTO: 0.03 THOUSAND/ÂΜL (ref 0–0.61)
EOSINOPHIL NFR BLD AUTO: 0 % (ref 0–6)
ERYTHROCYTE [DISTWIDTH] IN BLOOD BY AUTOMATED COUNT: 13.9 % (ref 11.6–15.1)
GFR SERPL CREATININE-BSD FRML MDRD: 53 ML/MIN/1.73SQ M
GLUCOSE SERPL-MCNC: 171 MG/DL (ref 65–140)
GLUCOSE SERPL-MCNC: 176 MG/DL (ref 65–140)
GLUCOSE SERPL-MCNC: 215 MG/DL (ref 65–140)
HCT VFR BLD AUTO: 23.7 % (ref 36.5–49.3)
HGB BLD-MCNC: 7.7 G/DL (ref 12–17)
IMM GRANULOCYTES # BLD AUTO: 0.06 THOUSAND/UL (ref 0–0.2)
IMM GRANULOCYTES NFR BLD AUTO: 1 % (ref 0–2)
LYMPHOCYTES # BLD AUTO: 0.65 THOUSANDS/ÂΜL (ref 0.6–4.47)
LYMPHOCYTES NFR BLD AUTO: 7 % (ref 14–44)
MAGNESIUM SERPL-MCNC: 2 MG/DL (ref 1.9–2.7)
MCH RBC QN AUTO: 28.5 PG (ref 26.8–34.3)
MCHC RBC AUTO-ENTMCNC: 32.5 G/DL (ref 31.4–37.4)
MCV RBC AUTO: 88 FL (ref 82–98)
MONOCYTES # BLD AUTO: 0.63 THOUSAND/ÂΜL (ref 0.17–1.22)
MONOCYTES NFR BLD AUTO: 7 % (ref 4–12)
NEUTROPHILS # BLD AUTO: 8.21 THOUSANDS/ÂΜL (ref 1.85–7.62)
NEUTS SEG NFR BLD AUTO: 85 % (ref 43–75)
NRBC BLD AUTO-RTO: 0 /100 WBCS
PHOSPHATE SERPL-MCNC: 2.4 MG/DL (ref 2.3–4.1)
PLATELET # BLD AUTO: 181 THOUSANDS/UL (ref 149–390)
PMV BLD AUTO: 11.3 FL (ref 8.9–12.7)
POTASSIUM SERPL-SCNC: 4.2 MMOL/L (ref 3.5–5.3)
PROT SERPL-MCNC: 6.5 G/DL (ref 6.4–8.4)
RBC # BLD AUTO: 2.7 MILLION/UL (ref 3.88–5.62)
SODIUM SERPL-SCNC: 136 MMOL/L (ref 135–147)
WBC # BLD AUTO: 9.6 THOUSAND/UL (ref 4.31–10.16)

## 2023-11-21 PROCEDURE — 99024 POSTOP FOLLOW-UP VISIT: CPT | Performed by: NURSE PRACTITIONER

## 2023-11-21 PROCEDURE — 85025 COMPLETE CBC W/AUTO DIFF WBC: CPT | Performed by: INTERNAL MEDICINE

## 2023-11-21 PROCEDURE — 84100 ASSAY OF PHOSPHORUS: CPT | Performed by: INTERNAL MEDICINE

## 2023-11-21 PROCEDURE — 83735 ASSAY OF MAGNESIUM: CPT | Performed by: INTERNAL MEDICINE

## 2023-11-21 PROCEDURE — 82948 REAGENT STRIP/BLOOD GLUCOSE: CPT

## 2023-11-21 PROCEDURE — 80053 COMPREHEN METABOLIC PANEL: CPT | Performed by: INTERNAL MEDICINE

## 2023-11-21 PROCEDURE — 99232 SBSQ HOSP IP/OBS MODERATE 35: CPT | Performed by: INTERNAL MEDICINE

## 2023-11-21 RX ORDER — INSULIN GLARGINE 100 [IU]/ML
15 INJECTION, SOLUTION SUBCUTANEOUS
Qty: 10 ML | Refills: 0
Start: 2023-11-21

## 2023-11-21 RX ORDER — ACETAMINOPHEN 325 MG/1
650 TABLET ORAL EVERY 6 HOURS PRN
Refills: 0
Start: 2023-11-21

## 2023-11-21 RX ORDER — OXYCODONE HYDROCHLORIDE 5 MG/1
5 TABLET ORAL EVERY 4 HOURS PRN
Qty: 20 TABLET | Refills: 0 | Status: SHIPPED | OUTPATIENT
Start: 2023-11-21 | End: 2023-11-21 | Stop reason: SDUPTHER

## 2023-11-21 RX ORDER — INSULIN LISPRO 100 [IU]/ML
1-6 INJECTION, SOLUTION INTRAVENOUS; SUBCUTANEOUS
Qty: 10 ML | Refills: 0
Start: 2023-11-21

## 2023-11-21 RX ORDER — HYDROXYZINE HYDROCHLORIDE 25 MG/1
25 TABLET, FILM COATED ORAL EVERY 6 HOURS PRN
Qty: 30 TABLET | Refills: 0
Start: 2023-11-21

## 2023-11-21 RX ORDER — CALCIUM CARBONATE 500 MG/1
500 TABLET, CHEWABLE ORAL 3 TIMES DAILY PRN
Refills: 0
Start: 2023-11-21

## 2023-11-21 RX ORDER — OXYCODONE HYDROCHLORIDE 5 MG/1
5 TABLET ORAL EVERY 4 HOURS PRN
Qty: 20 TABLET | Refills: 0 | Status: SHIPPED | OUTPATIENT
Start: 2023-11-21 | End: 2023-12-01

## 2023-11-21 RX ADMIN — HEPARIN SODIUM 5000 UNITS: 5000 INJECTION INTRAVENOUS; SUBCUTANEOUS at 06:32

## 2023-11-21 RX ADMIN — ACETAMINOPHEN 325MG 650 MG: 325 TABLET ORAL at 00:34

## 2023-11-21 RX ADMIN — CALCIUM CARBONATE 500 MG: 500 TABLET, CHEWABLE ORAL at 09:15

## 2023-11-21 RX ADMIN — GABAPENTIN 600 MG: 300 CAPSULE ORAL at 09:15

## 2023-11-21 RX ADMIN — ACETAMINOPHEN 325MG 650 MG: 325 TABLET ORAL at 12:27

## 2023-11-21 RX ADMIN — ISOSORBIDE MONONITRATE 60 MG: 60 TABLET, EXTENDED RELEASE ORAL at 09:15

## 2023-11-21 RX ADMIN — INSULIN LISPRO 2 UNITS: 100 INJECTION, SOLUTION INTRAVENOUS; SUBCUTANEOUS at 12:28

## 2023-11-21 RX ADMIN — FENOFIBRATE 145 MG: 145 TABLET, FILM COATED ORAL at 09:15

## 2023-11-21 RX ADMIN — CLONIDINE HYDROCHLORIDE 0.1 MG: 0.1 TABLET ORAL at 12:27

## 2023-11-21 RX ADMIN — CLOPIDOGREL BISULFATE 75 MG: 75 TABLET ORAL at 09:15

## 2023-11-21 RX ADMIN — HYDROXYZINE HYDROCHLORIDE 25 MG: 25 TABLET, FILM COATED ORAL at 06:45

## 2023-11-21 RX ADMIN — AMLODIPINE BESYLATE 5 MG: 5 TABLET ORAL at 09:15

## 2023-11-21 RX ADMIN — INSULIN LISPRO 1 UNITS: 100 INJECTION, SOLUTION INTRAVENOUS; SUBCUTANEOUS at 09:15

## 2023-11-21 RX ADMIN — ACETAMINOPHEN 325MG 650 MG: 325 TABLET ORAL at 06:29

## 2023-11-21 RX ADMIN — TORSEMIDE 20 MG: 20 TABLET ORAL at 09:15

## 2023-11-21 RX ADMIN — ASPIRIN 81 MG CHEWABLE TABLET 81 MG: 81 TABLET CHEWABLE at 09:15

## 2023-11-21 RX ADMIN — CLONIDINE HYDROCHLORIDE 0.1 MG: 0.1 TABLET ORAL at 00:34

## 2023-11-21 RX ADMIN — RANOLAZINE 500 MG: 500 TABLET, EXTENDED RELEASE ORAL at 09:15

## 2023-11-21 RX ADMIN — METOPROLOL TARTRATE 50 MG: 50 TABLET, FILM COATED ORAL at 09:15

## 2023-11-21 NOTE — ASSESSMENT & PLAN NOTE
Medicine team consulted by vascular surgery as patient experienced acute respiratory distress  On exam, patient was requiring 2 L nasal cannula and appeared mildly diaphoretic with complaints of nausea and anxiety  Family at bedside concern for anxiety/acute panic attack as patient with likely history of this in the past  Also concern for CHF given history of heart failure with preserved ejection fraction versus PE given recent surgery and immobility versus symptomatic anemia given reduced hemoglobin following femoropopliteal surgery versus ACS given history of CABG and coronary artery disease  Patient appears largely euvolemic with mild crackles in bilateral bases but no other signs of fluid overload  We will restart torsemide 20 mg daily starting 11/21; low concern for acute decompensated heart failure at this time  Some concern for PE, but breathing improved dramatically following Ativan administration; vital signs otherwise stable; consideration for obtaining D-dimer given, but patient just had surgery 4 days prior and would anticipate it being elevated nonetheless. No indication for CTA now given recent IVY and low probability Wells score for pulmonary emboli  Some concern for symptomatic anemia as patient with normal hemoglobin labs recently; currently at 8.0  We will monitor hemoglobin on daily labs and transfuse for hemoglobin of 7 unless symptoms continue, in which case transfuse for hemoglobin of less than 8  COVID swab-negative  Overall, patient symptoms have improved on subsequent exam; likely related to anxiety. Hydroxyzine ordered as needed to help control symptoms.

## 2023-11-21 NOTE — ASSESSMENT & PLAN NOTE
Wt Readings from Last 3 Encounters:   11/21/23 116 kg (255 lb 1.2 oz)   11/01/23 108 kg (239 lb)   10/31/23 108 kg (239 lb)     Medicine consulted as patient complaining of acute shortness of breath; was requiring oxygen (not on oxygen at baseline)  History of coronary artery disease and CHF  Recent echo with EF of 50% and grade 2 diastolic dysfunction  Confounding picture as patient with fine rales on bilateral bases; but pale conjunctive a and capillary refill of > 3 secs  Appears to be euvolemic; will restart oral torsemide 20 mg daily tomorrow  Low threshold to give IV Lasix if patient decompensates

## 2023-11-21 NOTE — PLAN OF CARE
Problem: PAIN - ADULT  Goal: Verbalizes/displays adequate comfort level or baseline comfort level  Description: Interventions:  - Encourage patient to monitor pain and request assistance  - Assess pain using appropriate pain scale  - Administer analgesics based on type and severity of pain and evaluate response  - Implement non-pharmacological measures as appropriate and evaluate response  - Consider cultural and social influences on pain and pain management  - Notify physician/advanced practitioner if interventions unsuccessful or patient reports new pain  Outcome: Progressing     Problem: INFECTION - ADULT  Goal: Absence or prevention of progression during hospitalization  Description: INTERVENTIONS:  - Assess and monitor for signs and symptoms of infection  - Monitor lab/diagnostic results  - Monitor all insertion sites, i.e. indwelling lines, tubes, and drains  - Monitor endotracheal if appropriate and nasal secretions for changes in amount and color  - Sparks appropriate cooling/warming therapies per order  - Administer medications as ordered  - Instruct and encourage patient and family to use good hand hygiene technique  - Identify and instruct in appropriate isolation precautions for identified infection/condition  Outcome: Progressing  Goal: Absence of fever/infection during neutropenic period  Description: INTERVENTIONS:  - Monitor WBC    Outcome: Progressing     Problem: SAFETY ADULT  Goal: Patient will remain free of falls  Description: INTERVENTIONS:  - Educate patient/family on patient safety including physical limitations  - Instruct patient to call for assistance with activity   - Consult OT/PT to assist with strengthening/mobility   - Keep Call bell within reach  - Keep bed low and locked with side rails adjusted as appropriate  - Keep care items and personal belongings within reach  - Initiate and maintain comfort rounds  - Make Fall Risk Sign visible to staff  - Offer Toileting every 2 Hours, in advance of need  - Initiate/Maintain bed alarm  - Obtain necessary fall risk management equipment:   - Apply yellow socks and bracelet for high fall risk patients  - Consider moving patient to room near nurses station  Outcome: Progressing  Goal: Maintain or return to baseline ADL function  Description: INTERVENTIONS:  -  Assess patient's ability to carry out ADLs; assess patient's baseline for ADL function and identify physical deficits which impact ability to perform ADLs (bathing, care of mouth/teeth, toileting, grooming, dressing, etc.)  - Assess/evaluate cause of self-care deficits   - Assess range of motion  - Assess patient's mobility; develop plan if impaired  - Assess patient's need for assistive devices and provide as appropriate  - Encourage maximum independence but intervene and supervise when necessary  - Involve family in performance of ADLs  - Assess for home care needs following discharge   - Consider OT consult to assist with ADL evaluation and planning for discharge  - Provide patient education as appropriate  Outcome: Progressing  Goal: Maintains/Returns to pre admission functional level  Description: INTERVENTIONS:  - Perform AM-PAC 6 Click Basic Mobility/ Daily Activity assessment daily.  - Set and communicate daily mobility goal to care team and patient/family/caregiver. - Collaborate with rehabilitation services on mobility goals if consulted  - Perform Range of Motion 8 times a day. - Reposition patient every 2 hours.   - Dangle patient 3 times a day  - Stand patient 3 times a day  - Ambulate patient 3 times a day  - Out of bed to chair 3 times a day   - Out of bed for meals 3 times a day  - Out of bed for toileting  - Record patient progress and toleration of activity level   Outcome: Progressing     Problem: DISCHARGE PLANNING  Goal: Discharge to home or other facility with appropriate resources  Description: INTERVENTIONS:  - Identify barriers to discharge w/patient and caregiver  - Arrange for needed discharge resources and transportation as appropriate  - Identify discharge learning needs (meds, wound care, etc.)  - Arrange for interpretive services to assist at discharge as needed  - Refer to Case Management Department for coordinating discharge planning if the patient needs post-hospital services based on physician/advanced practitioner order or complex needs related to functional status, cognitive ability, or social support system  Outcome: Progressing     Problem: Knowledge Deficit  Goal: Patient/family/caregiver demonstrates understanding of disease process, treatment plan, medications, and discharge instructions  Description: Complete learning assessment and assess knowledge base.   Interventions:  - Provide teaching at level of understanding  - Provide teaching via preferred learning methods  Outcome: Progressing     Problem: HEMATOLOGIC - ADULT  Goal: Maintains hematologic stability  Description: INTERVENTIONS  - Assess for signs and symptoms of bleeding or hemorrhage  - Monitor labs  - Administer supportive blood products/factors as ordered and appropriate  Outcome: Progressing     Problem: Prexisting or High Potential for Compromised Skin Integrity  Goal: Skin integrity is maintained or improved  Description: INTERVENTIONS:  - Identify patients at risk for skin breakdown  - Assess and monitor skin integrity  - Assess and monitor nutrition and hydration status  - Monitor labs   - Assess for incontinence   - Turn and reposition patient  - Assist with mobility/ambulation  - Relieve pressure over bony prominences  - Avoid friction and shearing  - Provide appropriate hygiene as needed including keeping skin clean and dry  - Evaluate need for skin moisturizer/barrier cream  - Collaborate with interdisciplinary team   - Patient/family teaching  - Consider wound care consult   Outcome: Progressing     Problem: GASTROINTESTINAL - ADULT  Goal: Maintains adequate nutritional intake  Description: INTERVENTIONS:  - Monitor percentage of each meal consumed  - Identify factors contributing to decreased intake, treat as appropriate  - Assist with meals as needed  - Monitor I&O, weight, and lab values if indicated  - Obtain nutrition services referral as needed  Outcome: Progressing     Problem: METABOLIC, FLUID AND ELECTROLYTES - ADULT  Goal: Glucose maintained within target range  Description: INTERVENTIONS:  - Monitor Blood Glucose as ordered  - Assess for signs and symptoms of hyperglycemia and hypoglycemia  - Administer ordered medications to maintain glucose within target range  - Assess nutritional intake and initiate nutrition service referral as needed  Outcome: Progressing     Problem: SKIN/TISSUE INTEGRITY - ADULT  Goal: Incision(s), wounds(s) or drain site(s) healing without S/S of infection  Description: INTERVENTIONS  - Assess and document dressing, incision, wound bed, drain sites and surrounding tissue  - Provide patient and family education  - Perform skin care/dressing changes every shift  Outcome: Progressing     Problem: MUSCULOSKELETAL - ADULT  Goal: Maintain or return mobility to safest level of function  Description: INTERVENTIONS:  - Assess patient's ability to carry out ADLs; assess patient's baseline for ADL function and identify physical deficits which impact ability to perform ADLs (bathing, care of mouth/teeth, toileting, grooming, dressing, etc.)  - Assess/evaluate cause of self-care deficits   - Assess range of motion  - Assess patient's mobility  - Assess patient's need for assistive devices and provide as appropriate  - Encourage maximum independence but intervene and supervise when necessary  - Involve family in performance of ADLs  - Assess for home care needs following discharge   - Consider OT consult to assist with ADL evaluation and planning for discharge  - Provide patient education as appropriate  Outcome: Progressing     Problem: NEUROSENSORY - ADULT  Goal: Achieves maximal functionality and self care  Description: INTERVENTIONS  - Monitor swallowing and airway patency with patient fatigue and changes in neurological status  - Encourage and assist patient to increase activity and self care. - Encourage visually impaired, hearing impaired and aphasic patients to use assistive/communication devices  Outcome: Progressing     Problem: Nutrition/Hydration-ADULT  Goal: Nutrient/Hydration intake appropriate for improving, restoring or maintaining nutritional needs  Description: Monitor and assess patient's nutrition/hydration status for malnutrition. Collaborate with interdisciplinary team and initiate plan and interventions as ordered. Monitor patient's weight and dietary intake as ordered or per policy. Utilize nutrition screening tool and intervene as necessary. Determine patient's food preferences and provide high-protein, high-caloric foods as appropriate.      INTERVENTIONS:  - Monitor oral intake, urinary output, labs, and treatment plans  - Assess nutrition and hydration status and recommend course of action  - Evaluate amount of meals eaten  - Assist patient with eating if necessary   - Allow adequate time for meals  - Recommend/ encourage appropriate diets, oral nutritional supplements, and vitamin/mineral supplements  - Order, calculate, and assess calorie counts as needed  - Recommend, monitor, and adjust tube feedings and TPN/PPN based on assessed needs  - Assess need for intravenous fluids  - Provide specific nutrition/hydration education as appropriate  - Include patient/family/caregiver in decisions related to nutrition  Outcome: Progressing

## 2023-11-21 NOTE — CASE MANAGEMENT
[facility] has received approved authorization from insurance:   Chrissy Bahena in by Rep:marvin SHAIKH#    Authorization received for: SNF  Facility: [address] #:[id number]   Start of Care:11/21  Next Review Date:11/28  Continued Stay Care Coordinator: Myriam SHAIKH#: [phone]  Submit next review to: fax [phone]   Care Manager notified:Joyce Gandhi Para

## 2023-11-21 NOTE — ASSESSMENT & PLAN NOTE
Lab Results   Component Value Date    HGBA1C 7.0 (H) 10/04/2023       Recent Labs     11/20/23  1121 11/20/23  1642 11/20/23  2115 11/21/23  0728   POCGLU 141* 125 156* 171*         Blood Sugar Average: Last 72 hrs:  (P) 145.9536969592403690    SSI; Subcutaneous Insulin Order Set  Blood Glucose checks TIDWM and QHS (Q6H for NPO patients)  Hold oral medications  Blood Glucose goal while inpatient is 140-180  Reduce basal insulin by 25-50% while inpatient  Consistent Carbohydrate Diet

## 2023-11-21 NOTE — ASSESSMENT & PLAN NOTE
Patient denies any chest pain; but did have acute episode of shortness of breath  Primary team obtained troponin to rule out ACS, however returned slightly elevated at 57 with repeat of 66  History of coronary artery disease with stenting and CABG; diabetes and hypertension  We will repeat troponin; EKG with no ST elevations or depressions  11/21-third troponin trended down to 49; likely slightly elevated in the setting of acute anxiety with demand ischemia. EKG negative for ST elevations or depressions; patient denies chest pain. No need to further trend troponins, follow-up in the outpatient setting.

## 2023-11-21 NOTE — CASE MANAGEMENT
Case Management Discharge Planning Note    Patient name Graciela Mccormick  Location [place] 2 /[facility] 2 Falguni Torres MRN [id number]  : 1945 Date 2023       Current Admission Date: 2023  Current Admission Diagnosis:Diabetic ulcer of right midfoot associated with diabetes mellitus due to underlying condition, limited to breakdown of skin [facility]   Patient Active Problem List    Diagnosis Date Noted    Acute respiratory distress 2023    Frequent PVCs 2023    Acute on chronic diastolic heart failure ([address]) 2023    Diabetic ulcer of right midfoot associated with diabetes mellitus due to underlying condition, limited to breakdown of skin ([address]) 06/15/2023    Hypertensive urgency 2023    Elevated troponin level not due myocardial infarction 2023    Stable angina pectoris 2023    Chronic kidney disease-mineral and bone disorder 2022    Mild aortic stenosis 2022    Chronic HFpEF/Moderate pHTN (HFpEF) ([address]) 11/10/2022    Gross hematuria 2022    Platelets decreased ([address]) 2022    Stage 3b chronic kidney disease ([address]) 2022    Actinic keratoses 2022    Type 2 diabetes mellitus with diabetic peripheral angiopathy without gangrene, with long-term current use of insulin ([address]) 2022    Varicose veins of left lower extremity 2021    History of endovascular stent graft for abdominal aortic aneurysm (AAA) 2021    Bruit (arterial) 2021    Peripheral artery disease ([address]) 2021    Type 2 diabetes mellitus with diabetic polyneuropathy, with long-term current use of insulin ([address]) 06/10/2021    Mixed hyperlipidemia 2021    Primary hypertension 2021    Coronary artery disease involving native coronary artery of native heart without angina pectoris 2021    S/P angioplasty with stent 2021    Hx of CABG 2021    S/P AAA repair 2021    S/P prostatectomy 2021    H/O prostate cancer 2021      LOS (days): 5  Geometric Mean LOS (GMLOS) (days): 3.80  Days to GMLOS:-0.9     OBJECTIVE:  Risk of Unplanned Readmission Score: 19.92         Current admission status: Inpatient   Preferred Pharmacy:   [address] [place] [address] #[id number] Roxann [place] [address]  Phone: [phone] Fax: [phone]    [facility] ([address] [place] [address]  [address]  [address] [place] [address]  Phone: [phone] Fax: [phone] [address] [facility], [address]  Phone: [phone] Fax: [phone]    Primary Care Provider: Shanel Leon DO    Primary Insurance: Norvel Host [facility] REP  Secondary Insurance:     [address] Number: [id number]

## 2023-11-21 NOTE — ASSESSMENT & PLAN NOTE
77 yo male w/ a hx of remote smoker, DM II, HTN, CKD III, CAD, HLD, PAD, AAA S/P EVAR, dCHF and frequent PVCs presented to [facility] on 11/16/23 for a scheduled RLE bypass. Pt has a hx of PAD w/ a non-healing R heel wound. Pt had a RLE angiogram which showed a long segment occlusion of SFA, AT and PT. Decision was made to bypass to the peroneal artery, which is the only remaining patent tibial vessel. Pt underwent R CFA to peroneal artery bypass w/ reversed cryovein on 11/16/23. Podiatry consulted for non-healing R heel wound. Wound was debrided at bedside on 11/17 w/ plan for local wound care. Urology consulted re: pt's inability to void. Pt has a hx of a robot-assisted radical prostatectomy in 2015 w/ artificial urinary sphincter placement in 2017. Dyer placed post-procedure and was removed on 11/18 w/ condom catheter placed. Pt declined reactivation of his sphincter device at that time as he doesn't feel up to managing the device or damaging the cuff. See "S/P prostatectomy" plan for further details. Pt is not at baseline level of mobility and PT recommended rehab prior to returning home. Plan:  -RLE CLTI w/ tissue loss  -S/P R CFA to peroneal artery bypass w/ reversed cryovein on 11/16 by Dr. Eleni Underwood.   -R calf and groin incisions well-approx w/ no erythema, drainage or swelling. Tenderness to palpation to R calf region. RLE bypass (+) signal w/ audible DP/PT and peroneal signals. Motor and sensation grossly intact. 2+ R fem pulse.  -Continue neurovascular checks  -Activity as tolerated  -PT/OT consulted and recommend post acute rehab placement.   -Case management consulted for discharge planning; placement pending  -Urology following for hx of artificial urinary sphincter and inability to void; input appreciated. -Podiatry following for local wound care; input appreciated.  No plans for surgical intervention at this time.  -Continue ASA, plavix, lipitor for medical management of PAD  -Will discuss w/  Jae Patterson

## 2023-11-21 NOTE — ASSESSMENT & PLAN NOTE
Lab Results   Component Value Date    HGBA1C 7.0 (H) 10/04/2023       Recent Labs     11/20/23  1121 11/20/23  1642 11/20/23  2115 11/21/23  0728   POCGLU 141* 125 156* 171*         Blood Sugar Average: Last 72 hrs:  (P) 610.5170599366438180    Care as per primary team

## 2023-11-21 NOTE — NURSING NOTE
Patient refusing morning blood-work to be drawn. Educated on reason for blood-work, continuing to refuse, saying he does not feel like being poked this morning.

## 2023-11-21 NOTE — DISCHARGE SUMMARY
Discharge Summary   Pretty Ruiz 78 y.o. male MRN: [id number]  Unit/Bed#: [facility] 2 [address] [facility] [place] 218-02 Encounter: [id number]    Admission Date: 11/16/2023     Discharge Date: 11/21/23    Attending: Robert Chester MD     Consultants: [facility], Urology    Admitting Diagnosis: Diabetic ulcer of right midfoot associated with type 2 diabetes mellitus, limited to breakdown of skin ([address]) [W98.684, L97.411]    Principle/ Secondary Diagnosis:  Past Medical History:   Diagnosis Date    CAD (coronary artery disease)     Cancer ([address])     prostate    CHF (congestive heart failure) ([address])     Chronic kidney disease     Diabetes mellitus ([address])     Hyperlipidemia     Hypertension     Myocardial infarction ([address])     Neuropathy     Bilateral feet    Sleep apnea     Could not tolerate CPAP     Past Surgical History:   Procedure Laterality Date    ADENOIDECTOMY      CARDIAC CATHETERIZATION Left 10/19/2022    Procedure: Cardiac Left Heart Cath;  Surgeon: Yara John MD;  [facility]: [facility];   Service: Cardiology    CARDIAC SURGERY  2002    3 cardiac bypass then angioplasty 7/2020    CHOLECYSTECTOMY      COLONOSCOPY      CORONARY ARTERY BYPASS GRAFT      IR LOWER EXTREMITY ANGIOGRAM  11/1/2023    WV BYPASS W/VEIN FEMORAL-POPLITEAL Right 11/16/2023    Procedure: BYPASS FEMORAL-POPLITEAL WITH CRYO VEIN, RIGHT FEMORAL ENDARTERECTOMY;  Surgeon: Luciano Ibanez MD;  Location: [facility];  Service: Vascular    WV Twenty Jeans Sport 3RD+ ORD [address] [facility] Right 11/1/2023    Procedure: ARTERIOGRAM Right lower extremity arteriogram with CO2 via right groin access;  Surgeon: Luciano Ibanez MD;  Location: [facility]IN OR;  Service: Vascular    PROSTATE SURGERY      TONSILLECTOMY         Procedures Performed:     WV BYPASS W/VEIN FEMORAL-POPLITEAL [26033] (BYPASS FEMORAL-POPLITEAL)  BYPASS FEMORAL-POPLITEAL WITH CRYO VEIN, RIGHT FEMORAL ENDARTERECTOMY (Right: Leg Upper)  Procedure(s):  BYPASS FEMORAL-POPLITEAL WITH CRYO VEIN, RIGHT FEMORAL ENDARTERECTOMY    Laboratory data at discharge:   Results from last 7 days   Lab Units 11/21/23  0937 11/20/23  1130 11/17/23  0537   WBC Thousand/uL 9.60 9.47 9.02   HEMOGLOBIN g/dL 7.7* 8.0* 8.0*   HEMATOCRIT % 23.7* 24.2* 25.7*   PLATELETS Thousands/uL 181 157 155     Results from last 7 days   Lab Units 11/21/23  0937 11/20/23  1130 11/17/23  0537 11/16/23  1518 11/16/23  1251   POTASSIUM mmol/L 4.2 4.0 3.8   < >  --    CHLORIDE mmol/L 105 105 112*   < >  --    CO2 mmol/L 25 25 24   < >  --    CO2, I-STAT mmol/L  --   --   --   --  20*   BUN mg/dL 26* 26* 29*   < >  --    CREATININE mg/dL 1.26 1.19 1.49*   < >  --    GLUCOSE, ISTAT mg/dl  --   --   --   --  159*   CALCIUM mg/dL 8.5 8.3* 7.4*   < >  --     < > = values in this interval not displayed. Results from last 7 days   Lab Units 11/16/23  0727   INR  1.15   PTT seconds 29           Discharge instructions/Information to patient and family:   See after visit summary for information provided to patient and family. Discharge Medications:  See after visit summary for reconciled discharge medications provided to patient and family. Medication Changes:  *Jardiance, metformin, amaryl and januvia D/C during hospitalization secondary to poor oral intake and NOT restarted on discharge. Consider restarting one at a time as pt's appetite improves. *Ranexa no longer required since RLE revascularization. *Lantus decreased from 22u to 15u secondary to poor oral intake. Consider increase as appetite improves. Hospital Course:   79 yo male w/ a hx of remote smoker, DM II, HTN, CKD III, CAD, HLD, PAD, AAA S/P EVAR, dCHF and frequent PVCs presented to [facility] on 11/16/23 for a scheduled RLE bypass. Pt has a hx of PAD w/ a non-healing R heel wound. Pt had a RLE angiogram which showed a long segment occlusion of SFA, AT and PT. Decision was made to bypass to the peroneal artery, which is the only remaining patent tibial vessel.  Pt underwent R CFA to peroneal artery bypass w/ reversed cryovein on 11/16/23. Podiatry consulted for non-healing R heel wound. Wound was debrided at bedside on 11/17 w/ plan for local wound care. Urology consulted re: pt's inability to void immediately post-op. Pt has a hx of a robot-assisted radical prostatectomy in 2015 w/ artificial urinary sphincter placement in 2017. Dyer placed post-procedure and was removed on 11/18 w/ condom catheter placed. Pt declined reactivation of his sphincter device as he doesn't feel up to managing the device and is afraid of damaging the cuff. Contact urology if pt feels ready for reactivation of the device. Post-op, pt's mobility was significantly decreased from baseline. PT/OT were consulted and recommended rehab. On 11/20, staff reported an acute episode of SOB and pt was seen in consult by [facility]. SOB improved dramatically w/ ativan and episode was felt to be largely secondary to anxiety. Pt's home torsemide was also restarted at that time. ABLA stabilized without intervention w/ Hgb 7.7-8.0. Pt was cleared for discharge by [facility] on 11/21/23. Pt and family are eager for him to be discharged to rehab as soon as possible. R groin and medial calf incisions well-approx, no erythema or drainage. Surgical glue present on R groin incision and staples intact on R medial calf incision. R plantar heel wound w/ drsg dry and intact. Pt will continue on ASA, plavix, lipitor, and fenofibrate for medical management of PAD. Pt has a follow up appt in the vascular surgery office on 11/30/23. Hospital course was complicated by the following:   ABLA: secondary to procedural and dilutional loss  Anxiety: resolved w/ ativan and hydroxyzine prn     Prior to discharge, the patient was given instructions for outpatient care and follow-up. The patient has been instructed to call w/ any questions, changes, or concerns for the medical condition.     For further details of the hospitalization, please refer to the medical record. Condition at Discharge: fair     Provisions for Follow-Up Care:  See after visit summary for information related to follow-up care and any pertinent home health orders. Disposition: Short-term rehab    Planned Readmission: No    Discharge Statement   I spent 35 minutes discharging the patient including review of diagnostic results, counseling and coordination of care w/ family, medication reconciliation, and coordination of care with case management and consulting physicians. This time was spent on the day of discharge. I had direct contact with the patient on the day of discharge.       APOLLO Alva  11/21/2023

## 2023-11-21 NOTE — PROGRESS NOTES
[address] [facility]  Progress Note  Name: Binh Robison  MRN: [id number]  Unit/Bed#: [facility] -02 I Date of Admission: 11/16/2023   Date of Service: 11/21/2023 I Hospital Day: 5    Assessment/Plan   Acute respiratory distress  Assessment & Plan  Medicine team consulted by vascular surgery as patient experienced acute respiratory distress  On exam, patient was requiring 2 L nasal cannula and appeared mildly diaphoretic with complaints of nausea and anxiety  Family at bedside concern for anxiety/acute panic attack as patient with likely history of this in the past  Also concern for CHF given history of heart failure with preserved ejection fraction versus PE given recent surgery and immobility versus symptomatic anemia given reduced hemoglobin following femoropopliteal surgery versus ACS given history of CABG and coronary artery disease  Patient appears largely euvolemic with mild crackles in bilateral bases but no other signs of fluid overload  We will restart torsemide 20 mg daily starting 11/21; low concern for acute decompensated heart failure at this time  Some concern for PE, but breathing improved dramatically following Ativan administration; vital signs otherwise stable; consideration for obtaining D-dimer given, but patient just had surgery 4 days prior and would anticipate it being elevated nonetheless. No indication for CTA now given recent IVY and low probability Wells score for pulmonary emboli  Some concern for symptomatic anemia as patient with normal hemoglobin labs recently; currently at 8.0  We will monitor hemoglobin on daily labs and transfuse for hemoglobin of 7 unless symptoms continue, in which case transfuse for hemoglobin of less than 8  COVID swab-negative  Overall, patient symptoms have improved on subsequent exam; likely related to anxiety. Hydroxyzine ordered as needed to help control symptoms.       Chronic HFpEF/Moderate pHTN (HFpEF) ([facility])  Assessment & Plan  Wt Readings from Last 3 Encounters:   11/21/23 116 kg (255 lb 1.2 oz)   11/01/23 108 kg (239 lb)   10/31/23 108 kg (239 lb)     Medicine consulted as patient complaining of acute shortness of breath; was requiring oxygen (not on oxygen at baseline)  History of coronary artery disease and CHF  Recent echo with EF of 50% and grade 2 diastolic dysfunction  Confounding picture as patient with fine rales on bilateral bases; but pale conjunctive a and capillary refill of > 3 secs  Appears to be euvolemic; will restart oral torsemide 20 mg daily tomorrow  Low threshold to give IV Lasix if patient decompensates            Stage 3b chronic kidney disease [facility])  Assessment & Plan  Lab Results   Component Value Date    EGFR 57 11/20/2023    EGFR 43 11/17/2023    EGFR 41 11/16/2023    CREATININE 1.19 11/20/2023    CREATININE 1.49 (H) 11/17/2023    CREATININE 1.57 (H) 11/16/2023     IVY resolved; monitor renal function on daily labs  Avoid relative hypotension and nephrotoxic medications    Peripheral artery disease ([address])  Assessment & Plan  Continue treatment as per primary team    Type 2 diabetes mellitus with diabetic polyneuropathy, with long-term current use of insulin [facility])  Assessment & Plan  Lab Results   Component Value Date    HGBA1C 7.0 (H) 10/04/2023       Recent Labs     11/20/23  1121 11/20/23  1642 11/20/23  2115 11/21/23  0728   POCGLU 141* 125 156* 171*         Blood Sugar Average: Last 72 hrs:  (P) 145.1169088486020715    SSI; Subcutaneous Insulin Order Set  Blood Glucose checks TIDWM and QHS (Q6H for NPO patients)  Hold oral medications  Blood Glucose goal while inpatient is 140-180  Reduce basal insulin by 25-50% while inpatient  Consistent Carbohydrate Diet      Coronary artery disease involving native coronary artery of native heart without angina pectoris  Assessment & Plan  Patient denies any chest pain; but did have acute episode of shortness of breath  Primary team obtained troponin to rule out ACS, however returned slightly elevated at 62 with repeat of 66  History of coronary artery disease with stenting and CABG; diabetes and hypertension  We will repeat troponin; EKG with no ST elevations or depressions  11/21-third troponin trended down to 49; likely slightly elevated in the setting of acute anxiety with demand ischemia. EKG negative for ST elevations or depressions; patient denies chest pain. No need to further trend troponins, follow-up in the outpatient setting. Primary hypertension  Assessment & Plan  Currently well controlled; continue amlodipine, metoprolol, Imdur    * Diabetic ulcer of right midfoot associated with diabetes mellitus due to underlying condition, limited to breakdown of skin [facility])  Assessment & Plan  Lab Results   Component Value Date    HGBA1C 7.0 (H) 10/04/2023       Recent Labs     11/20/23  1121 11/20/23  1642 11/20/23  2115 11/21/23  0728   POCGLU 141* 125 156* 171*         Blood Sugar Average: Last 72 hrs:  (P) 458.5302927955021992    Care as per primary team             VTE Pharmacologic Prophylaxis:   Pharmacologic: Heparin  Mechanical VTE Prophylaxis in Place: No    Patient Centered Rounds: I have performed bedside rounds with nursing staff today. Discussions with Specialists or Other Care Team Provider: Vascular surgery    Education and Discussions with Family / Patient: Discussed treatment plan with family and patient who agree with current plan; encouraged to ask questions and participate. Time Spent for Care: 45 minutes. More than 50% of total time spent on counseling and coordination of care as described above. Current Length of Stay: 5 day(s)    Current Patient Status: Inpatient       Discharge Plan: As per primary team    Code Status: Prior      Subjective:   Patient seen and examined bedside, currently saturating well on room air.   Chest x-ray obtained yesterday; wet read shows right hemidiaphragm elevated which is chronic, but no other acute disease. Troponin trended down to 49 and likely slight elevation was secondary to anxiety from yesterday's initial exam.  Procalcitonin negative currently; COVID-negative; repeat labs this morning stable. Hemoglobin slightly declined to 7.7; will defer that to vascular surgery. Otherwise stable for discharge. All rest as per primary team.    Objective:     Vitals:   Temp (24hrs), Av °F (37.2 °C), Min:98.1 °F (36.7 °C), Max:99.9 °F (37.7 °C)    Temp:  [98.1 °F (36.7 °C)-99.9 °F (37.7 °C)] 98.1 °F (36.7 °C)  HR:  [60-92] 66  Resp:  [17-18] 18  BP: (115-153)/(51-73) 115/51  SpO2:  [90 %-96 %] 96 %  Body mass index is 32.75 kg/m². Input and Output Summary (last 24 hours):     No intake or output data in the 24 hours ending 23 1801    Physical Exam:     Physical Exam  Vitals and nursing note reviewed. Constitutional:       General: He is not in acute distress. Appearance: He is well-developed. HENT:      Head: Normocephalic and atraumatic. Eyes:      Conjunctiva/sclera: Conjunctivae normal.   Cardiovascular:      Rate and Rhythm: Normal rate and regular rhythm. Heart sounds: Murmur heard. Comments: Reports history of murmur in the past  Pulmonary:      Effort: Pulmonary effort is normal. No respiratory distress. Breath sounds: Rales present. Comments: Very fine bibasilar rales  Abdominal:      Palpations: Abdomen is soft. Tenderness: There is no abdominal tenderness. Musculoskeletal:         General: No swelling. Cervical back: Neck supple. Right lower leg: Edema present. Left lower leg: No edema. Comments: 2+ right lower extremity edema; no edema of left lower extremity   Skin:     General: Skin is warm and dry. Neurological:      Mental Status: He is alert and oriented to person, place, and time.    Psychiatric:         Mood and Affect: Mood normal.           Additional Data:     Labs:    Results from last 7 days   Lab Units 23  0907 WBC Thousand/uL 9.60   HEMOGLOBIN g/dL 7.7*   HEMATOCRIT % 23.7*   PLATELETS Thousands/uL 181   NEUTROS PCT % 85*   LYMPHS PCT % 7*   MONOS PCT % 7   EOS PCT % 0     Results from last 7 days   Lab Units 11/21/23  0937   SODIUM mmol/L 136   POTASSIUM mmol/L 4.2   CHLORIDE mmol/L 105   CO2 mmol/L 25   BUN mg/dL 26*   CREATININE mg/dL 1.26   ANION GAP mmol/L 6   CALCIUM mg/dL 8.5   ALBUMIN g/dL 3.2*   TOTAL BILIRUBIN mg/dL 0.66   ALK PHOS U/L 38   ALT U/L 12   AST U/L 19   GLUCOSE RANDOM mg/dL 176*     Results from last 7 days   Lab Units 11/16/23  0727   INR  1.15     Results from last 7 days   Lab Units 11/21/23  1224 11/21/23  0728 11/20/23  2115 11/20/23  1642 11/20/23  1121 11/20/23  0711 11/19/23  2046 11/19/23  1605 11/19/23  1128 11/19/23  0718 11/19/23  0130 11/18/23  2058   POC GLUCOSE mg/dl 215* 171* 156* 125 141* 162* 160* 125 145* 108 142* 152*         Results from last 7 days   Lab Units 11/20/23  1130 11/16/23  1518   LACTIC ACID mmol/L  --  1.0   PROCALCITONIN ng/ml 0.13  --            * I Have Reviewed All Lab Data Listed Above. * Additional Pertinent Lab Tests Reviewed: All Labs Within Last 24 Hours Reviewed    Imaging:    Imaging Reports Reviewed Today Include:   Imaging Personally Reviewed by Myself Includes: Chest x-ray    Recent Cultures (last 7 days):           Last 24 Hours Medication List:        Today, Patient Was Seen By: Johnnie Rosenberg MD    ** Please Note: Dictation voice to text software may have been used in the creation of this document.  **

## 2023-11-21 NOTE — CASE MANAGEMENT
Case Management Discharge Planning Note    Patient name Conchis London  Location [address] [facility] 2 /South 2 Crystal Mackey* MRN [id number]  : 1945 Date 2023         OBJECTIVE:  Risk of Unplanned Readmission Score: 19.92         Current admission status: Inpatient   Preferred Pharmacy:   Primary Care Provider: Jarrett Geren DO    Primary Insurance: [facility]  Secondary Insurance:     DISCHARGE DETAILS:                           Contacts  Patient Contacts: Missy Handy Dtr  Relationship to Patient[de-identified] Family  Contact Method:  In Person  Reason/Outcome: Discharge Planning                        Treatment Team Recommendation: Short Term Rehab  Discharge Destination Plan[de-identified] SNF, Short Term Rehab ([address] [facility])  Transport at Discharge : [facility]  Dispatcher Contacted: Yes  Number/Name of Dispatcher: Roundtrip  Transported by [employer] and Unit #): [facility]  ETA of Transport (Date): 23  ETA of Transport (Time): [address] [facility], [address] [facility]  Receiving Facility/Agency Phone Number: [phone] ext [phone]

## 2023-11-22 ENCOUNTER — PATIENT OUTREACH (OUTPATIENT)
Dept: CASE MANAGEMENT | Facility: OTHER | Age: 78
End: 2023-11-22

## 2023-11-22 ENCOUNTER — TELEPHONE (OUTPATIENT)
Age: 78
End: 2023-11-22

## 2023-11-22 NOTE — PROGRESS NOTES
Outpatient care management referral via HRR report 11/22/23. Discharged to [facility] 11/21/23. Call placed to facilty who confirmed the patient is at their SNF  for STR. This Admin Coordinator will continue to monitor via chart review.

## 2023-11-22 NOTE — TELEPHONE ENCOUNTER
New Patient    What is the reason for the patient’s appointment?: f/u from hospital stay for urinary retention     Per nursing home patient is using a condom catheter     Eli Wiley consulted patient while he was being hospitalized on 11/16/23. Please see his notes for reference. What office location does the patient prefer?: [facility]     Have patient records been requested?:  If No, are the records showing in Epic: records in epic       HISTORY:   Has the patient had any previous Urologist(s)?: per nursing home no     Patient is now scheduled with  at our [facility] office on 11/27/23. [facility] called to schedule this visit.

## 2023-11-22 NOTE — UTILIZATION REVIEW
NOTIFICATION OF ADMISSION DISCHARGE   This is a Notification of Discharge from [address] [facility]. Please be advised that this patient has been discharge from our facility. Below you will find the admission and discharge date and time including the patient’s disposition. UTILIZATION REVIEW CONTACT:  Ernesto Tadeo MA  Utilization   Network Utilization Review Department  Phone: [phone] x carefully listen to the prompts. All voicemails are confidential.  Email: [email or web address]     ADMISSION INFORMATION  PRESENTATION DATE: 11/16/2023  6:31 AM  OBERVATION ADMISSION DATE:   INPATIENT ADMISSION DATE: 11/16/23  2:35 PM   DISCHARGE DATE: 11/21/2023  1:24 PM   DISPOSITION:[facility] SNF/TCU/SNU    Network Utilization Review Department  ATTENTION: Please call with any questions or concerns to [phone] and carefully listen to the prompts so that you are directed to the right person. All voicemails are confidential.   For Discharge needs, contact Care Management DC Support Team at [phone] opt. [phone]  Send all requests for admission clinical reviews, approved or denied determinations and any other requests to dedicated fax number below belonging to the campus where the patient is receiving treatment.  List of dedicated fax numbers for the Facilities:  [place] DENIALS (Administrative/Medical Necessity) [phone]   DISCHARGE SUPPORT TEAM (Network) [phone] [address] [facility] (Maternity/NICU/Pediatrics) [phone]   [address] [facility] [address] [address] [facility] [facility] [address] [facility] [address] [place] [address] [address] [phone]

## 2023-11-27 ENCOUNTER — OFFICE VISIT (OUTPATIENT)
Dept: UROLOGY | Facility: CLINIC | Age: 78
End: 2023-11-27
Payer: COMMERCIAL

## 2023-11-27 ENCOUNTER — TELEPHONE (OUTPATIENT)
Dept: VASCULAR SURGERY | Facility: CLINIC | Age: 78
End: 2023-11-27

## 2023-11-27 VITALS
HEIGHT: 74 IN | DIASTOLIC BLOOD PRESSURE: 82 MMHG | HEART RATE: 54 BPM | OXYGEN SATURATION: 99 % | BODY MASS INDEX: 32.75 KG/M2 | SYSTOLIC BLOOD PRESSURE: 128 MMHG

## 2023-11-27 DIAGNOSIS — N39.3 STRESS INCONTINENCE OF URINE: ICD-10-CM

## 2023-11-27 DIAGNOSIS — C61 PROSTATE CANCER (HCC): ICD-10-CM

## 2023-11-27 DIAGNOSIS — Z96.0 STATUS POST IMPLANTATION OF ARTIFICIAL URINARY SPHINCTER: Primary | ICD-10-CM

## 2023-11-27 PROCEDURE — 99214 OFFICE O/P EST MOD 30 MIN: CPT | Performed by: STUDENT IN AN ORGANIZED HEALTH CARE EDUCATION/TRAINING PROGRAM

## 2023-11-27 RX ORDER — CHLORHEXIDINE GLUCONATE ORAL RINSE 1.2 MG/ML
SOLUTION DENTAL
COMMUNITY
Start: 2023-11-22

## 2023-11-27 NOTE — PROGRESS NOTES
Urology Ambulatory Progress Note  11/27/2023    Hoda Sharp  1945  [id number]      Problem List Items Addressed This Visit    None  Visit Diagnoses       Status post implantation of artificial urinary sphincter    -  Primary    Prostate cancer [facility])        Relevant Orders    PSA Total, Diagnostic    Stress incontinence of urine                Assessment:  Stress incontinence status post artificial urinary sphincter-AUS was reactivated and cycled to confirm it was working as expected. The patient may resume typical use. Prostate cancer status post radical prostatectomy-currently BLAIRE. Most recent PSA was 1 month ago. We will see the patient back in 1 year with a PSA prior    Plan:  RTC in 1 year with PSA prior      Chief Complaint: Follow-up urinary retention    History of Present Illness  Hoda Sharp is a 78 y.o. male with a history of prostate cancer status post RALP in 2015. He later underwent artificial urinary sphincter placement in 2017 for stress incontinence. He cycles the device without any issues. He underwent a vascular procedure on November 16, 2023. After the procedure the patient was bladder scanned for 300 cc and was unable to void. My partner, Dr. Rina Doll, deactivated the sphincter and a 15 [place] urethral catheter was placed atraumatically. It was removed 48 hours later. The patient returns for evaluation and consideration of reactivation. The patient voided about an hour prior to this appointment and his PVR is 18mL. He reports that postoperatively due to the anesthesia he was out of it and therefore unable to cycle the device. The patient would like to establish care with [facility] urology. Last PSA was checked October 2023 and was undetectable.     Previous PSA values:  Lab Results   Component Value Date    PSA <0.01 10/04/2023    PSA <0.1 10/10/2022    PSA <0.1 06/02/2021       Past Medical History  Past Medical History:   Diagnosis Date    CAD (coronary artery disease) Cancer [facility])     prostate    CHF (congestive heart failure) (HCC)     Chronic kidney disease     Diabetes mellitus (HCC)     Hyperlipidemia     Hypertension     Myocardial infarction ([address])     Neuropathy     Bilateral feet    Sleep apnea     Could not tolerate CPAP       Past Surgical History  Past Surgical History:   Procedure Laterality Date    ADENOIDECTOMY      CARDIAC CATHETERIZATION Left 10/19/2022    Procedure: Cardiac Left Heart Cath;  Surgeon: Zeny Santamaria MD;  Location: [facility] CARDIAC CATH LAB; Service: Cardiology    CARDIAC SURGERY  2002    3 cardiac bypass then angioplasty 7/2020    CHOLECYSTECTOMY      COLONOSCOPY      CORONARY ARTERY BYPASS GRAFT      IR LOWER EXTREMITY ANGIOGRAM  11/1/2023    VT BYPASS W/VEIN FEMORAL-POPLITEAL Right 11/16/2023    Procedure: BYPASS FEMORAL-POPLITEAL WITH CRYO VEIN, RIGHT FEMORAL ENDARTERECTOMY;  Surgeon: Urszula Ying MD;  Location: [facility] Main OR;  Service: Vascular    VT Dlrisara Hinton 3RD+ ORD [address] PEL/LXTR [facility] Right 11/1/2023    Procedure: ARTERIOGRAM Right lower extremity arteriogram with CO2 via right groin access;  Surgeon: Urszula Ying MD;  Location: [facility] MAIN OR;  Service: Vascular    PROSTATE SURGERY      TONSILLECTOMY         Physical Exam  /82 (BP Location: Right arm, Patient Position: Sitting, Cuff Size: Adult)   Pulse (!) 54   Ht 6' 2" (1.88 m)   SpO2 99%   BMI 32.75 kg/m²     General: no acute distress. Presents in a wheelchair due to mobility after RLE procedure  Head:  Normocephalic, atraumatic. Cardiovascular:  Regular rate  Respiratory:  Patient has unlabored respirations. Genitourinary: Normal phallus with physical incontinence prior to sphincter reactivation, sphincter pump is palpable within the right hemiscrotum and is freely mobile      Carlie Cisneros MD  [address] for Urology    Portions of the above record have been created with voice recognition software.  Occasional wrong word or "sound alike" substitution may have occurred due to the inherent limitations of voice recognition software. Please read the chart carefully and recognize, using context, where substitution may have occurred. For further clarification, please contact me directly.

## 2023-11-27 NOTE — TELEPHONE ENCOUNTER
Vascular Nurse Navigator Post Op Call    Procedure: Right common femoral artery to peroneal artery bypass with reversed CryoVein conduit     Date of Procedure: 11/16/23    Surgeon: MD Dr. Manuel Cortes Dr., MD     Discharge Date: 11/21/23    Discharge Disposition: Other [address] at [facility]    Leg Weakness?: No    Leg Swelling?: Yes    Leg Numbness?: No    Chest Pain?: No    Shortness of Breath?: No    Orthopnea?: No    Anticoagulation pt was discharged on post op?: Aspirin and Clopidogrel (Plavix)    Statin pt was discharged on post op?:  Lipitor (atorvastatin)    Bleeding?: No    Uncontrolled Pain?: No    Incision Concerns?: No    Fever or Chills?: No      Reviewed discharge instructions and incision care with patient. NEXT OFFICE VISIT SCHEDULED:  11/30/23 at 11 am with Dr. Elizabeth Arango at The [address] [place] [address]    Transportation Confirmed?: Yes - Per Coco Maldonado, nurse at rehab facility, daughters to transport for appointment      Any further questions/concerns? Spoke with Bon Wick, nurse at [facility], in regards to above. She stated that patient is doing good since discharge. She stated that patient's left leg is swollen and has been elevating his leg. She stated that his incisions are clean, dry and intact and without any signs of infection. Reviewed incision care with her - wash daily with soap and water. Reviewed discharge medications - Aspirin and Plavix. All questions answered. No concerns expressed at this time.

## 2023-11-28 DIAGNOSIS — N18.30 BENIGN HYPERTENSION WITH CKD (CHRONIC KIDNEY DISEASE) STAGE III (HCC): ICD-10-CM

## 2023-11-28 DIAGNOSIS — I12.9 BENIGN HYPERTENSION WITH CKD (CHRONIC KIDNEY DISEASE) STAGE III (HCC): ICD-10-CM

## 2023-11-28 RX ORDER — CLONIDINE HYDROCHLORIDE 0.1 MG/1
0.1 TABLET ORAL EVERY 12 HOURS
Qty: 180 TABLET | Refills: 1 | Status: SHIPPED | OUTPATIENT
Start: 2023-11-28

## 2023-11-29 ENCOUNTER — PATIENT OUTREACH (OUTPATIENT)
Dept: CASE MANAGEMENT | Facility: OTHER | Age: 78
End: 2023-11-29

## 2023-11-29 NOTE — PROGRESS NOTES
Call placed to facility who confirmed the patient is currently at their facility for STR no LCD at this time. This Admin Coordinator will continue to monitor via chart review.

## 2023-11-30 ENCOUNTER — OFFICE VISIT (OUTPATIENT)
Dept: VASCULAR SURGERY | Facility: CLINIC | Age: 78
End: 2023-11-30

## 2023-11-30 ENCOUNTER — TELEPHONE (OUTPATIENT)
Dept: PODIATRY | Facility: CLINIC | Age: 78
End: 2023-11-30

## 2023-11-30 VITALS
HEIGHT: 74 IN | HEART RATE: 68 BPM | DIASTOLIC BLOOD PRESSURE: 62 MMHG | SYSTOLIC BLOOD PRESSURE: 138 MMHG | WEIGHT: 240 LBS | BODY MASS INDEX: 30.8 KG/M2

## 2023-11-30 DIAGNOSIS — E08.621 DIABETIC ULCER OF RIGHT MIDFOOT ASSOCIATED WITH DIABETES MELLITUS DUE TO UNDERLYING CONDITION, LIMITED TO BREAKDOWN OF SKIN (HCC): Primary | ICD-10-CM

## 2023-11-30 DIAGNOSIS — L97.411 DIABETIC ULCER OF RIGHT MIDFOOT ASSOCIATED WITH DIABETES MELLITUS DUE TO UNDERLYING CONDITION, LIMITED TO BREAKDOWN OF SKIN (HCC): Primary | ICD-10-CM

## 2023-11-30 PROCEDURE — 99024 POSTOP FOLLOW-UP VISIT: CPT | Performed by: SURGERY

## 2023-11-30 NOTE — PATIENT INSTRUCTIONS
1. Diabetic ulcer of right midfoot associated with diabetes mellitus due to underlying condition, limited to breakdown of skin [facility])  Assessment & Plan:    Lab Results   Component Value Date    HGBA1C 7.0 (H) 10/04/2023     Mr. Joseph Raymond is an 18-year-old diabetic male with a right heel nonhealing wound. He is now 2 weeks postoperative from a right common femoral to peroneal artery bypass with reversed CryoVein. At the 1 month eliane he will undergo a bypass graft duplex with ABIs. He should undergo evaluation by podiatry in very short interval.  I will reach out to his podiatrist personally through epic to request his right heel be evaluated and dead skin/tissue be debrided. CryoVein conduits do not have great patency rates especially when anastomosed to a tibial vessel.   The effort to heal his wound must take place in the short-term interval.    Orders:  -     VAS lower limb arterial duplex, limited/unilateral; Future; Expected date: 12/14/2023

## 2023-11-30 NOTE — PROGRESS NOTES
Assessment/Plan:    Diabetic ulcer of right midfoot associated with diabetes mellitus due to underlying condition, limited to breakdown of skin [facility])    Lab Results   Component Value Date    HGBA1C 7.0 (H) 10/04/2023     Mr. Lorrie Maldonado is an 57-year-old diabetic male with a right heel nonhealing wound. He is now 2 weeks postoperative from a right common femoral to peroneal artery bypass with reversed CryoVein. At the 1 month eliane he will undergo a bypass graft duplex with ABIs. He should undergo evaluation by podiatry in very short interval.  I will reach out to his podiatrist personally through epic to request his right heel be evaluated and dead skin/tissue be debrided. CryoVein conduits do not have great patency rates especially when anastomosed to a tibial vessel. The effort to heal his wound must take place in the short-term interval.    Subjective:      Patient ID: Christie Hinton is a 78 y.o. male. Patient is s/p R fem endarterectomy & fem-pop bypass done 11/16. Patient has non-healing R foot ulcer. Patient denies fever or chills, incison is C/D/I. HPI  Susan Thomas is an 57-year-old male who is now 2 weeks postop from his lower extremity bypass for right heel wound. He has been at a rehab facility and is slowly mobilizing. He does complain of right lower extremity swelling. He does have persistence of his wound however it appears to me to be healing. He does need to have podiatric evaluation and debridement of dead tissue. We had podiatry see him in the hospital setting however no debridement took place at that time. I would prefer if he is seen in the next several days as his time for optimal wound healing is the present. Review of Systems   Constitutional: Negative. HENT: Negative. Eyes: Negative. Respiratory: Negative. Cardiovascular:  Positive for leg swelling. Gastrointestinal: Negative. Endocrine: Negative. Genitourinary: Negative.     Musculoskeletal:  Positive for gait problem. Skin:  Positive for wound. Allergic/Immunologic: Negative. Hematological: Negative. Psychiatric/Behavioral: Negative. Objective:      /62 (BP Location: Right arm, Patient Position: Sitting, Cuff Size: Standard)   Pulse 68   Ht 6' 2" (1.88 m)   Wt 109 kg (240 lb)   BMI 30.81 kg/m²          Physical Exam  Constitutional:       Appearance: Normal appearance. HENT:      Head: Normocephalic and atraumatic. Nose: Nose normal. No rhinorrhea. Eyes:      Extraocular Movements: Extraocular movements intact. Pupils: Pupils are equal, round, and reactive to light. Cardiovascular:      Rate and Rhythm: Normal rate and regular rhythm. Pulses:           Dorsalis pedis pulses are detected w/ Doppler on the right side and detected w/ Doppler on the left side. Posterior tibial pulses are detected w/ Doppler on the right side and detected w/ Doppler on the left side. Pulmonary:      Effort: Pulmonary effort is normal.      Breath sounds: No stridor. Abdominal:      General: There is no distension. Tenderness: There is no abdominal tenderness. Musculoskeletal:         General: No tenderness. Normal range of motion. Cervical back: Normal range of motion and neck supple. Skin:     General: Skin is warm. Capillary Refill: Capillary refill takes less than 2 seconds. Coloration: Skin is not jaundiced. Comments: Well-healing surgical incisions. Neurological:      General: No focal deficit present. Mental Status: He is alert and oriented to person, place, and time.    Psychiatric:         Mood and Affect: Mood normal.         Behavior: Behavior normal.

## 2023-11-30 NOTE — LETTER
November 30, 2023     Serenadeepthi DO Daniela  [address]  [address] [place] [address]    Patient: Gilson Roper   YOB: 1945   Date of Visit: 11/30/2023       Dear Dr. Anneliese Hannah:    Thank you for referring Gilson Roper to me for evaluation. Below are my notes for this consultation. If you have questions, please do not hesitate to call me. I look forward to following your patient along with you. Sincerely,        Trung Jaramillo MD        CC: MICHAEL Schmid MD  11/30/2023 11:32 AM  Sign when Signing Visit  Assessment/Plan:    Diabetic ulcer of right midfoot associated with diabetes mellitus due to underlying condition, limited to breakdown of skin [facility])    Lab Results   Component Value Date    HGBA1C 7.0 (H) 10/04/2023     Mr. Gopal Guadarrama is an 69-year-old diabetic male with a right heel nonhealing wound. He is now 2 weeks postoperative from a right common femoral to peroneal artery bypass with reversed CryoVein. At the 1 month eliane he will undergo a bypass graft duplex with ABIs. He should undergo evaluation by podiatry in very short interval.  I will reach out to his podiatrist personally through epic to request his right heel be evaluated and dead skin/tissue be debrided. CryoVein conduits do not have great patency rates especially when anastomosed to a tibial vessel. The effort to heal his wound must take place in the short-term interval.    Subjective:      Patient ID: Gilson Roper is a 78 y.o. male. Patient is s/p R fem endarterectomy & fem-pop bypass done 11/16. Patient has non-healing R foot ulcer. Patient denies fever or chills, incison is C/D/I. HPI  Agustin Jiang is an 69-year-old male who is now 2 weeks postop from his lower extremity bypass for right heel wound. He has been at a rehab facility and is slowly mobilizing. He does complain of right lower extremity swelling.   He does have persistence of his wound however it appears to me to be healing. He does need to have podiatric evaluation and debridement of dead tissue. We had podiatry see him in the hospital setting however no debridement took place at that time. I would prefer if he is seen in the next several days as his time for optimal wound healing is the present. Review of Systems   Constitutional: Negative. HENT: Negative. Eyes: Negative. Respiratory: Negative. Cardiovascular:  Positive for leg swelling. Gastrointestinal: Negative. Endocrine: Negative. Genitourinary: Negative. Musculoskeletal:  Positive for gait problem. Skin:  Positive for wound. Allergic/Immunologic: Negative. Hematological: Negative. Psychiatric/Behavioral: Negative. Objective:      /62 (BP Location: Right arm, Patient Position: Sitting, Cuff Size: Standard)   Pulse 68   Ht 6' 2" (1.88 m)   Wt 109 kg (240 lb)   BMI 30.81 kg/m²          Physical Exam  Constitutional:       Appearance: Normal appearance. HENT:      Head: Normocephalic and atraumatic. Nose: Nose normal. No rhinorrhea. Eyes:      Extraocular Movements: Extraocular movements intact. Pupils: Pupils are equal, round, and reactive to light. Cardiovascular:      Rate and Rhythm: Normal rate and regular rhythm. Pulses:           Dorsalis pedis pulses are detected w/ Doppler on the right side and detected w/ Doppler on the left side. Posterior tibial pulses are detected w/ Doppler on the right side and detected w/ Doppler on the left side. Pulmonary:      Effort: Pulmonary effort is normal.      Breath sounds: No stridor. Abdominal:      General: There is no distension. Tenderness: There is no abdominal tenderness. Musculoskeletal:         General: No tenderness. Normal range of motion. Cervical back: Normal range of motion and neck supple. Skin:     General: Skin is warm. Capillary Refill: Capillary refill takes less than 2 seconds. Coloration: Skin is not jaundiced. Comments: Well-healing surgical incisions. Neurological:      General: No focal deficit present. Mental Status: He is alert and oriented to person, place, and time.    Psychiatric:         Mood and Affect: Mood normal.         Behavior: Behavior normal.

## 2023-11-30 NOTE — TELEPHONE ENCOUNTER
Caller: Dr. Lizbeth Mantilla - Vascular Surgeon    Doctor: Dariel Martin DPM    Reason for call: Dr. Valarie Payan performed a bypass on Amy Crawford. Amy Crawford needs his right heel wound debrided as soon as possible.     Dr. Valarie Payan is requesting a call from Dr. Faith Bello    Call back#: [phone]

## 2023-12-01 ENCOUNTER — OFFICE VISIT (OUTPATIENT)
Age: 78
End: 2023-12-01
Payer: COMMERCIAL

## 2023-12-01 VITALS
SYSTOLIC BLOOD PRESSURE: 117 MMHG | RESPIRATION RATE: 18 BRPM | HEIGHT: 74 IN | BODY MASS INDEX: 30.8 KG/M2 | WEIGHT: 240 LBS | DIASTOLIC BLOOD PRESSURE: 55 MMHG | HEART RATE: 47 BPM

## 2023-12-01 DIAGNOSIS — E11.621 DIABETIC ULCER OF RIGHT HEEL ASSOCIATED WITH TYPE 2 DIABETES MELLITUS, LIMITED TO BREAKDOWN OF SKIN (HCC): Primary | ICD-10-CM

## 2023-12-01 DIAGNOSIS — L97.411 DIABETIC ULCER OF RIGHT HEEL ASSOCIATED WITH TYPE 2 DIABETES MELLITUS, LIMITED TO BREAKDOWN OF SKIN (HCC): Primary | ICD-10-CM

## 2023-12-01 DIAGNOSIS — E11.42 DIABETIC POLYNEUROPATHY ASSOCIATED WITH TYPE 2 DIABETES MELLITUS (HCC): ICD-10-CM

## 2023-12-01 DIAGNOSIS — I70.209 PERIPHERAL ARTERIOSCLEROSIS (HCC): ICD-10-CM

## 2023-12-01 PROCEDURE — 11045 DBRDMT SUBQ TISS EACH ADDL: CPT | Performed by: PODIATRIST

## 2023-12-01 PROCEDURE — 11042 DBRDMT SUBQ TIS 1ST 20SQCM/<: CPT | Performed by: PODIATRIST

## 2023-12-01 NOTE — PROGRESS NOTES
Assessment/Plan: Diabetic foot ulcer plantar aspect right heel. Diabetic neuropathy. Peripheral artery disease. Plan. Chart reviewed. Patient examined. Patient family advised on condition. Today wound debrided. Aftercare instruction given. Patient be placed on Silvadene. Return for follow-up. Debridement    Universal Protocol:  Consent: Verbal consent obtained. Written consent not obtained. Risks and benefits: risks, benefits and alternatives were discussed  Consent given by: patient  Time out: Immediately prior to procedure a "time out" was called to verify the correct patient, procedure, equipment, support staff and site/side marked as required. Timeout called at: 12/1/2023 10:55 AM.  Patient understanding: patient states understanding of the procedure being performed  Patient identity confirmed: verbally with patient    Performed by: physician  Debridement type: surgical  Level of debridement: subcutaneous tissue  Pain control: none  Post-debridement measurements  Length (cm): 0.5  Width (cm): 0.5  Depth (cm): 0.2  Percent debrided: 100%  Surface Area (cm^2): 0.25  Area debrided (cm^2): 0.25  Volume (cm^3): 0.05  Tissue and other material debrided: dermis, epidermis and hypergranulation  Devitalized tissue debrided: biofilm  Instrument(s) utilized: blade  Bleeding: none  Hemostasis obtained with: not applicable  Procedural pain (0-10): insensate  Post-procedural pain: insensate   Response to treatment: procedure was tolerated well               Diagnoses and all orders for this visit:    Diabetic ulcer of right heel associated with type 2 diabetes mellitus, limited to breakdown of skin ([address])    Diabetic polyneuropathy associated with type 2 diabetes mellitus ([address])    Peripheral arteriosclerosis ([address])          Subjective: Patient is diabetic. He has plantar wound of the right foot. This is status post nail puncture. Patient is presently in skilled nursing facility.   He recently underwent arteriogram with stenting. He presents for evaluation. Allergies   Allergen Reactions    Lisinopril Rash and Lip Swelling         Current Outpatient Medications:     acetaminophen (TYLENOL) 325 mg tablet, Take 2 tablets (650 mg total) by mouth every 6 (six) hours as needed for mild pain (Patient not taking: Reported on 11/27/2023), Disp: , Rfl: 0    amLODIPine (NORVASC) 10 mg tablet, Take 0.5 tablets (5 mg total) by mouth daily, Disp: 1 tablet, Rfl: 1    aspirin 81 mg chewable tablet, Chew 81 mg daily, Disp: , Rfl:     atorvastatin (LIPITOR) 40 mg tablet, Take 1 tablet (40 mg total) by mouth daily (Patient taking differently: Take 40 mg by mouth every evening), Disp: 90 tablet, Rfl: 3    Blood Glucose Monitoring Suppl (OneTouch Verio Reflect) w/Device KIT, Check blood sugars twice daily. Please substitute with appropriate alternative as covered by patient's insurance. Dx: E11.65 (Patient not taking: Reported on 11/27/2023), Disp: 1 kit, Rfl: 0    calcium carbonate (TUMS) 500 mg chewable tablet, Chew 1 tablet (500 mg total) 3 (three) times a day as needed for indigestion or heartburn, Disp: , Rfl: 0    chlorhexidine (PERIDEX) 0.12 % solution, , Disp: , Rfl:     cloNIDine (CATAPRES) 0.1 mg tablet, take 1 tablet by mouth every 12 hours, Disp: 180 tablet, Rfl: 1    clopidogrel (PLAVIX) 75 mg tablet, TAKE 1 TABLET BY MOUTH DAILY, Disp: 90 tablet, Rfl: 0    Droplet Pen Needles 32G X 4 MM MISC, USE EVERY EVENING (Patient not taking: Reported on 11/27/2023), Disp: 100 each, Rfl: 5    Elastic Bandages & Supports (Medical Compression Stockings) MISC, Use daily Knee High 15-20mmHg, Disp: 2 each, Rfl: 4    fenofibrate 160 MG tablet, Take 1 tablet (160 mg total) by mouth daily, Disp: 90 tablet, Rfl: 3    gabapentin (NEURONTIN) 600 MG tablet, TAKE 1 TABLET BY MOUTH  TWICE DAILY, Disp: 180 tablet, Rfl: 3    glucose blood (OneTouch Verio) test strip, Check blood sugars twice daily.  Please substitute with appropriate alternative as covered by patient's insurance. Dx: E11.65 (Patient not taking: Reported on 11/27/2023), Disp: 200 each, Rfl: 3    hydrOXYzine HCL (ATARAX) 25 mg tablet, Take 1 tablet (25 mg total) by mouth every 6 (six) hours as needed for anxiety (Patient not taking: Reported on 11/27/2023), Disp: 30 tablet, Rfl: 0    insulin glargine (LANTUS) 100 units/mL subcutaneous injection, Inject 15 Units under the skin daily at bedtime, Disp: 10 mL, Rfl: 0    insulin lispro (HumaLOG) 100 units/mL injection, Inject 1-6 Units under the skin 4 (four) times a day (before meals and at bedtime) (Patient not taking: Reported on 11/27/2023), Disp: 10 mL, Rfl: 0    isosorbide mononitrate (IMDUR) 60 mg 24 hr tablet, Take 1 tablet (60 mg total) by mouth daily, Disp: 90 tablet, Rfl: 3    metoprolol tartrate (LOPRESSOR) 50 mg tablet, Take 1 tablet (50 mg total) by mouth every 12 (twelve) hours, Disp: 180 tablet, Rfl: 3    Multiple Vitamins-Minerals (MULTIVITAMIN MEN 50+ PO), Take by mouth daily (Patient not taking: Reported on 11/27/2023), Disp: , Rfl:     nitroglycerin (NITROSTAT) 0.4 mg SL tablet, Place 1 tablet (0.4 mg total) under the tongue every 5 (five) minutes as needed for chest pain (Patient not taking: Reported on 11/27/2023), Disp: 30 tablet, Rfl: 3    Omega-3 Fatty Acids (fish oil) 1,000 mg, Take 4,000 mg by mouth 2 (two) times a day (Patient not taking: Reported on 11/27/2023), Disp: , Rfl:     OneTouch Delica Lancets D MISC, Check blood sugars twice daily. Please substitute with appropriate alternative as covered by patient's insurance.  Dx: E11.65 (Patient not taking: Reported on 11/27/2023), Disp: 200 each, Rfl: 3    oxyCODONE (ROXICODONE) 5 immediate release tablet, Take 1 tablet (5 mg total) by mouth every 4 (four) hours as needed for moderate pain for up to 10 days Max Daily Amount: 30 mg (Patient not taking: Reported on 11/27/2023), Disp: 20 tablet, Rfl: 0    ranolazine (RANEXA) 500 mg 12 hr tablet, take 1 tablet by mouth twice a day, Disp: 60 tablet, Rfl: 3    torsemide (DEMADEX) 20 mg tablet, Take 1 tablet (20 mg total) by mouth daily (Patient not taking: Reported on 11/27/2023), Disp: 90 tablet, Rfl: 3    Patient Active Problem List   Diagnosis    Mixed hyperlipidemia    Primary hypertension    Coronary artery disease involving native coronary artery of native heart without angina pectoris    S/P angioplasty with stent    Hx of CABG    S/P AAA repair    S/P prostatectomy    H/O prostate cancer    Type 2 diabetes mellitus with diabetic polyneuropathy, with long-term current use of insulin ([address])    Varicose veins of left lower extremity    History of endovascular stent graft for abdominal aortic aneurysm (AAA)    Bruit (arterial)    Peripheral artery disease ([address])    Type 2 diabetes mellitus with diabetic peripheral angiopathy without gangrene, with long-term current use of insulin (HCC)    Actinic keratoses    Stage 3b chronic kidney disease (HCC)    Platelets decreased ([address])    Gross hematuria    Chronic HFpEF/Moderate pHTN (HFpEF) (HCC)    Mild aortic stenosis    Chronic kidney disease-mineral and bone disorder    Stable angina pectoris    Hypertensive urgency    Elevated troponin level not due myocardial infarction    Diabetic ulcer of right midfoot associated with diabetes mellitus due to underlying condition, limited to breakdown of skin ([address])    Acute on chronic diastolic heart failure ([address])    Frequent PVCs    Acute respiratory distress          Patient ID: Natalie Anderson is a 78 y.o. male. HPI    The following portions of the patient's history were reviewed and updated as appropriate:     family history includes Alcohol abuse in his father; Diabetes in his mother. reports that he quit smoking about 35 years ago. His smoking use included cigarettes. He started smoking about 67 years ago. He has a 49.50 pack-year smoking history.  He has never used smokeless tobacco. He reports current alcohol use of about 14.0 standard drinks of alcohol per week. He reports that he does not use drugs. Vitals:    12/01/23 1038   BP: 117/55   Pulse: (!) 47   Resp: 18       Review of Systems      Objective:  Patient's shoes and socks removed. Foot ExamPhysical Exam        General  General Appearance: appears stated age and healthy   Orientation: alert and oriented to person, place, and time   Affect: appropriate   Gait: antalgic         Right Foot/Ankle      Inspection and Palpation  Tenderness: metatarsals   Swelling: none   Arch: pes planus  Claw Toes: fifth toe  Hallux limitus: yes  Skin Exam: callus, dry skin and tinea; Neurovascular  Dorsalis pedis: 1+  Posterior tibial: 1+  Saphenous nerve sensation: absent  Tibial nerve sensation: absent  Superficial peroneal nerve sensation: absent  Deep peroneal nerve sensation: absent  Sural nerve sensation: absent        Left Foot/Ankle       Inspection and Palpation  Tenderness: metatarsals   Swelling: none   Arch: pes planus  Claw toes: second toe and fifth toe  Hallux limitus: yes  Skin Exam: callus, dry skin and tinea; Neurovascular  Dorsalis pedis: 1+  Posterior tibial: 1+  Saphenous nerve sensation: absent  Tibial nerve sensation: absent  Superficial peroneal nerve sensation: absent  Deep peroneal nerve sensation: absent  Sural nerve sensation: absent           Physical Exam  Vitals signs and nursing note reviewed. Cardiovascular:      Rate and Rhythm: Normal rate and regular rhythm. Pulses:           Dorsalis pedis pulses are 1+ on the right side and 1+ on the left side. Posterior tibial pulses are 1+ on the right side and 1+ on the left side. Feet:      Right foot:      Skin integrity: Callus and dry skin present. Left foot:      Skin integrity: Callus and dry skin present. Skin:     Capillary Refill: Capillary refill takes 2 to 3 seconds. Comments:   All nails are thick and mycotic. Patient demonstrates bilateral dermatophytosis.   There is a pre ulcerative/ corn on the plantar aspect 2nd left toe. Right Foot/Ankle   Right Foot Inspection  Skin Exam: dry skin, callus and callus                                 Vascular     The right DP pulse is 1+. The right PT pulse is 1+. Right Toe  - Comprehensive Exam  Arch: pes planus  Claw Toes: fifth toe  Hallux limitus: yes  Swelling: none   Tenderness: metatarsals            Left Foot/Ankle  Left Foot Inspection  Skin Exam: dry skin and callus                                             Vascular     The left DP pulse is 1+. The left PT pulse is 1+. Left Toe  - Comprehensive Exam  Arch: pes planus  Claw toes: second toe and fifth toe  Hallux limitus: yes  Swelling: none   Tenderness: metatarsals      Patient's shoes and socks removed. Assign Risk Category:  Deformity present; Loss of protective sensation; Weak pulses       Risk: 2        Patient has profound dermatophytosis and xerosis of the foot bilateral.  There is a large Sumner grade 2 ulcer on the plantar aspect of the right heel. Wound is approxione 0.5 cm² in size. No evidence of cellulitis at this time. Palpable plantar spurring noted. Rule out osteomyelitis.

## 2023-12-04 ENCOUNTER — TELEPHONE (OUTPATIENT)
Age: 78
End: 2023-12-04

## 2023-12-04 NOTE — TELEPHONE ENCOUNTER
Asking if PCP will sign off on plan of care for nursing , PT and potentially OT.  Can call back tomorrow

## 2023-12-05 ENCOUNTER — TELEPHONE (OUTPATIENT)
Dept: FAMILY MEDICINE CLINIC | Facility: CLINIC | Age: 78
End: 2023-12-05

## 2023-12-05 NOTE — TELEPHONE ENCOUNTER
----- Message from Phillip Bartholomew sent at 12/4/2023  2:59 PM EST -----  Regarding: TCM visit  Patient needing TCM follow-up:      Patient discharged from [address] [facility] to [facility] on 11/21/23 with primary diagnosis of Diabetic ulcer of right midfoot associated with diabetes mellitus due to underlying condition, limited to breakdown of skin. Discharged from facility on 12/2/23 to home. Please call patient to set up a TCM visit if you have not already done so. Thank you.

## 2023-12-05 NOTE — TELEPHONE ENCOUNTER
Left message on machine for Eulis Reach to call us back. Okay to give message.   Teresa Martinez, CMA pt was found laying on the floor in [employer].  arrives aaox3 with slurred speech.  c/o back pain.

## 2023-12-06 ENCOUNTER — PATIENT OUTREACH (OUTPATIENT)
Dept: CASE MANAGEMENT | Facility: OTHER | Age: 78
End: 2023-12-06

## 2023-12-06 DIAGNOSIS — Z79.4 TYPE 2 DIABETES MELLITUS WITH DIABETIC POLYNEUROPATHY, WITH LONG-TERM CURRENT USE OF INSULIN (HCC): Primary | ICD-10-CM

## 2023-12-06 DIAGNOSIS — E11.42 TYPE 2 DIABETES MELLITUS WITH DIABETIC POLYNEUROPATHY, WITH LONG-TERM CURRENT USE OF INSULIN (HCC): Primary | ICD-10-CM

## 2023-12-06 NOTE — PROGRESS NOTES
Call placed to [address] and spoke with [facility] who confirmed the patient discharged 12/2/23 to Home. This Admn Coordinator requested discharge instructions. When Catrachito Sabale has received the Discharge paperwork  Admin Coordinator will attach to this encounter. I have removed myself off of the care team, added the CM to the care team who will follow the patient through the episode, sent the care manager an inbasket notifying them of the HRR Referal.  Ambulatory referral placed for complex care management.

## 2023-12-06 NOTE — TELEPHONE ENCOUNTER
Message left to call back so we can schedule a rehab follow up appt, 30 minutes. Outreach was not within 2 days of discharge so please schedule OVL, rehab follow up.  Thank you Vi Allen

## 2023-12-07 ENCOUNTER — TELEPHONE (OUTPATIENT)
Dept: FAMILY MEDICINE CLINIC | Facility: CLINIC | Age: 78
End: 2023-12-07

## 2023-12-07 ENCOUNTER — PATIENT OUTREACH (OUTPATIENT)
Dept: FAMILY MEDICINE CLINIC | Facility: CLINIC | Age: 78
End: 2023-12-07

## 2023-12-07 ENCOUNTER — TELEPHONE (OUTPATIENT)
Age: 78
End: 2023-12-07

## 2023-12-07 RX ORDER — ONDANSETRON 4 MG/1
4 TABLET, FILM COATED ORAL EVERY 8 HOURS PRN
COMMUNITY

## 2023-12-07 NOTE — TELEPHONE ENCOUNTER
Luke BARR called to report the patient was evaluated today, she will have 3 times physical therapist.     1- this week   2 next week     Home therapist also the patient is having therapy leahw FELIX

## 2023-12-07 NOTE — PROGRESS NOTES
This RNCM called patient and introduced self and explained the role of the RNCM. Patient states he is doing very good since discharged from [facility]. He states he is getting around without any assistive devices now. He does have a Rolator and cane which he was using prior. Patient denies any fever or chills. He states that his incisions look good. He has an incision below his right knee which still has staples and a groin incision on the right. He denies any oozing, redness, warmth or pain. He states that the wound on his right foot looks a lot better and no drainage, redness, warmth or pain here either. He says the swelling has gone down immensely. Patient states he has all of his medications, manages them himself and takes them as directed. His medications were reviewed. Patient was previously on Pentoxifylline but was discontinued at [facility]. This was discussed and I told him I would send a message to his Vascular Surgeon to confirm it should be stopped. He was thankful for this. We reviewed patients medications. Patient is also no longer taking Humalog on a sliding scale. He says [facility]. This is on his list. He states he does not see Endocrinology but Dr Freda Pierson manages his DM and Insulin. Patient is aware of his most recent A1C was 7.0 and this was discussed and he stated understanding. Patient with a h/o CHF and DM. This were discussed. Patient states a good understanding of his HF. HF Zone Tool and s/s to look for and when to call his Cardiologist were reviewed and he states understanding. He denies any CP, chest tightness, SOB, cough, lightheadedness, dizziness at this time, any new swelling or weight gain. Patient weighs himself daily. Today he weighed 225 lbs. He is aware to call his Cardiologist with a weight gain of 3 lbs in 1 day or 5 lbs in 5 days. Patient adheres to a low Na diet and foods with high Na were reviewed.  Patient denies any questions or concerns regarding HF.    Patient with a h/o DM and this was discussed as well. Patient denies any s/s of hypo or hyperglycemia. Patient is aware of what to look for and when to call for help. Patient checks his BS daily in the am. Last BS was 119, the one before was 90. Patient watches his Carb intake. Patient denies any questions or concerns regarding. Patient has f/u apt's with his PCP on 12/13 and with vascular Surgery on 12/21. He states he is hoping to be driving himself but if he cannot his family will take him. Patient declines the need for CCM services. I told him I would message his Vascular Surgeon regarding Pentoxifylline and his PCP regarding Humalog. He was very appreciative of this. He is agreeable to a follow up call regarding this. Patient was provided with my contact information and encouraged to call with any questions or concerns. IB messages sent to both Vascular Surgery and PCP regarding the above. RNCM to follow.

## 2023-12-07 NOTE — PROGRESS NOTES
HRR/SNF referral received via IB. Chart reviewed. Patient was admitted to IP at [facility] 11/16/23-11/21/23 and underwent R fem pop bypass. Patient with h/o R mid foot diabetic ulcer. Patient also with h/o DM2-A1C 7.0 on 10/24/23 and on Insulin. CHF-on Torsemide. Patient discharged to [facility] for [facility] 11/21/23-12/2/23. Patient discharged home. Per notes patient will have [address].

## 2023-12-07 NOTE — TELEPHONE ENCOUNTER
----- Message from Olga Florian RN sent at 12/7/2023 11:02 AM EST -----  Regarding: Insulin  Hi Dr Lombardi! I just got off thde phone with Thomas and he states he is taking Novolog Insulin at hs but is not taking Humalog on a sliding scale. Humalog is in his med list. Can you please advise if patient should be taking this or not? Thanks! Olga

## 2023-12-07 NOTE — TELEPHONE ENCOUNTER
His last A1c was 7 so I would say at 78 if he is not using the scale he can stay not using the sliding scale as his A1c is acceptable

## 2023-12-08 ENCOUNTER — RA CDI HCC (OUTPATIENT)
Dept: OTHER | Facility: HOSPITAL | Age: 78
End: 2023-12-08

## 2023-12-08 ENCOUNTER — PATIENT OUTREACH (OUTPATIENT)
Dept: FAMILY MEDICINE CLINIC | Facility: CLINIC | Age: 78
End: 2023-12-08

## 2023-12-08 ENCOUNTER — TELEPHONE (OUTPATIENT)
Dept: FAMILY MEDICINE CLINIC | Facility: CLINIC | Age: 78
End: 2023-12-08

## 2023-12-08 DIAGNOSIS — Z79.4 TYPE 2 DIABETES MELLITUS WITH DIABETIC PERIPHERAL ANGIOPATHY WITHOUT GANGRENE, WITH LONG-TERM CURRENT USE OF INSULIN (HCC): Primary | ICD-10-CM

## 2023-12-08 DIAGNOSIS — Z79.4 TYPE 2 DIABETES MELLITUS WITH DIABETIC PERIPHERAL ANGIOPATHY WITHOUT GANGRENE, WITH LONG-TERM CURRENT USE OF INSULIN (HCC): ICD-10-CM

## 2023-12-08 DIAGNOSIS — E11.51 TYPE 2 DIABETES MELLITUS WITH DIABETIC PERIPHERAL ANGIOPATHY WITHOUT GANGRENE, WITH LONG-TERM CURRENT USE OF INSULIN (HCC): ICD-10-CM

## 2023-12-08 DIAGNOSIS — E11.51 TYPE 2 DIABETES MELLITUS WITH DIABETIC PERIPHERAL ANGIOPATHY WITHOUT GANGRENE, WITH LONG-TERM CURRENT USE OF INSULIN (HCC): Primary | ICD-10-CM

## 2023-12-08 DIAGNOSIS — E11.42 TYPE 2 DIABETES MELLITUS WITH DIABETIC POLYNEUROPATHY, WITHOUT LONG-TERM CURRENT USE OF INSULIN (HCC): ICD-10-CM

## 2023-12-08 RX ORDER — BLOOD SUGAR DIAGNOSTIC
STRIP MISCELLANEOUS
Qty: 200 STRIP | Refills: 5 | Status: SHIPPED | OUTPATIENT
Start: 2023-12-08

## 2023-12-08 NOTE — TELEPHONE ENCOUNTER
----- Message from Olga Florian RN sent at 12/8/2023  1:40 PM EST -----  Regarding: Insulin/Jardianc/Januvia  Hi Dr Lombardi! I just got off the phone with Thomas. He said he forgot to tell me he is taking Jardiance 25 mg daily and Januvia 100 mg daily. They were discontinued during his hospital stay. I think they may have stopped them and started the other Insulin on a sliding scale in their place. He says he has been taking the Januvia and Jardiance since he got home from [facility]. This is not reflected in his mediations on the chart and the orders were cancelled. If you want him to continue with these new prescriptions will need to be sent. Please let me know if you would like him to continue Januvia and Jardiance! Thank you so much! Olga

## 2023-12-08 NOTE — PROGRESS NOTES
This RNCM received a message back from Dr Elisha Nielson regarding the Humalog Insulin. He states it is fine if patient is not taking it because his A1C is acceptable at 77 y/o. I also received a message back from Dr Gilson Abrams with vascular Surgery regarding the Pentoxifyline and he states he did not order this medication. I called Audelia Charley back today and discussed the above. He states that he thinks Dr Chavez Longest his Podiatrist ordered the Pentoyxifyline. He also states he forgot to tell me he is taking [place] and Januvia. These were discontinued when discharged from the hospital. He was receiving Lantus and Humalog on a sliding scale in [facility]. He states he has been taking Jardiance 25 mg daily and Januvia 100 mg daily since he got home from [facility] because he has always been on it. I told him to continue taking what he is taking and I would message Dr Renny Huynh regarding and message Dr Marito Prather regarding Pentoxifyline. He states he is doing well today and has no other needs at this time. He was very appreciative of the help. IB message sent to Dr Marito Prather and Dr Elisha Nielson regarding the above. RNCM to follow.

## 2023-12-08 NOTE — PROGRESS NOTES
This RNCM received IB message back from Dr Wang Winchester stating he would like patient to take the Pentoyxifyline. He feels it will help the mariano wound heal quicker. I called patient and made him aware and he states he will resume taking it.

## 2023-12-08 NOTE — PROGRESS NOTES
[address] [facility] coding opportunities          Chart Reviewed number of suggestions sent to Provider: 1     Patients Insurance   [id number]  Medicare Insurance: [employer]

## 2023-12-11 ENCOUNTER — PATIENT OUTREACH (OUTPATIENT)
Dept: FAMILY MEDICINE CLINIC | Facility: CLINIC | Age: 78
End: 2023-12-11

## 2023-12-11 NOTE — PROGRESS NOTES
Chart reviewed and this RNCM called patient for follow up. Patient says he is doing well. He has all of his medications and is taking them as directed. He is taking the Pentoyxiyline as previous. Patient declines any need for CCM services at this time. I confirmed he has my contact information and encouraged him to call with any questions or concerns.     CCM episode closed and this RNCM removed self from care team.

## 2023-12-13 ENCOUNTER — OFFICE VISIT (OUTPATIENT)
Dept: FAMILY MEDICINE CLINIC | Facility: CLINIC | Age: 78
End: 2023-12-13
Payer: COMMERCIAL

## 2023-12-13 VITALS
HEART RATE: 60 BPM | RESPIRATION RATE: 20 BRPM | WEIGHT: 229 LBS | SYSTOLIC BLOOD PRESSURE: 142 MMHG | TEMPERATURE: 98.5 F | BODY MASS INDEX: 29.4 KG/M2 | DIASTOLIC BLOOD PRESSURE: 62 MMHG

## 2023-12-13 DIAGNOSIS — E11.42 TYPE 2 DIABETES MELLITUS WITH DIABETIC POLYNEUROPATHY, WITH LONG-TERM CURRENT USE OF INSULIN (HCC): Primary | ICD-10-CM

## 2023-12-13 DIAGNOSIS — E78.2 MIXED HYPERLIPIDEMIA: ICD-10-CM

## 2023-12-13 DIAGNOSIS — I10 PRIMARY HYPERTENSION: ICD-10-CM

## 2023-12-13 DIAGNOSIS — Z79.4 TYPE 2 DIABETES MELLITUS WITH DIABETIC POLYNEUROPATHY, WITH LONG-TERM CURRENT USE OF INSULIN (HCC): Primary | ICD-10-CM

## 2023-12-13 DIAGNOSIS — I25.10 CORONARY ARTERY DISEASE INVOLVING NATIVE CORONARY ARTERY OF NATIVE HEART WITHOUT ANGINA PECTORIS: ICD-10-CM

## 2023-12-13 DIAGNOSIS — Z95.828 HISTORY OF ENDOVASCULAR STENT GRAFT FOR ABDOMINAL AORTIC ANEURYSM (AAA): ICD-10-CM

## 2023-12-13 DIAGNOSIS — S91.101A OPEN WOUND OF RIGHT GREAT TOE, INITIAL ENCOUNTER: ICD-10-CM

## 2023-12-13 DIAGNOSIS — N18.32 STAGE 3B CHRONIC KIDNEY DISEASE (HCC): ICD-10-CM

## 2023-12-13 PROCEDURE — 99214 OFFICE O/P EST MOD 30 MIN: CPT | Performed by: FAMILY MEDICINE

## 2023-12-13 NOTE — PROGRESS NOTES
Assessment/Plan:    1. Type 2 diabetes mellitus with diabetic polyneuropathy, with long-term current use of insulin (HCC)  -     Albumin / creatinine urine ratio; Future; Expected date: 03/13/2024  -     Comprehensive metabolic panel; Future; Expected date: 03/13/2024  -     Hemoglobin A1C; Future; Expected date: 03/13/2024  -     CBC and differential; Future; Expected date: 03/13/2024  -     Lipid Panel with Direct LDL reflex; Future; Expected date: 03/13/2024    2. Primary hypertension  -     Albumin / creatinine urine ratio; Future; Expected date: 03/13/2024  -     Comprehensive metabolic panel; Future; Expected date: 03/13/2024  -     Hemoglobin A1C; Future; Expected date: 03/13/2024  -     CBC and differential; Future; Expected date: 03/13/2024  -     Lipid Panel with Direct LDL reflex; Future; Expected date: 03/13/2024    3. Coronary artery disease involving native coronary artery of native heart without angina pectoris    4. Mixed hyperlipidemia  -     Albumin / creatinine urine ratio; Future; Expected date: 03/13/2024  -     Comprehensive metabolic panel; Future; Expected date: 03/13/2024  -     Hemoglobin A1C; Future; Expected date: 03/13/2024  -     CBC and differential; Future; Expected date: 03/13/2024  -     Lipid Panel with Direct LDL reflex; Future; Expected date: 03/13/2024    5. Stage 3b chronic kidney disease ([address])    6. History of endovascular stent graft for abdominal aortic aneurysm (AAA)    7. Open wound of right great toe, initial encounter    Advised on wound care for great toe        There are no Patient Instructions on file for this visit. Return in 3 months (on 3/13/2024) for Diabetes Follow-up. Subjective:      Patient ID: Natacha Nguyen is a 78 y.o. male.     Chief Complaint   Patient presents with   • Follow-up     REHAB          SAS/CMA       Pt is here for follow up from rehab  Pt states his surgery was rough - he did not like the pain kills  Pt has been out of rehab for four days or so  Does not require a cane or walker to ambulate  Pt is having normal BM now  No blood  Sugars in rehab were up and down      Pt states yesterday when he was getting out of the shower a big chunk of skin came off his foot and he had quite a bit of blood. Would like me to look at it  Pt's appt with podiatry is the 26th  States his original wound seems to be healing up. He does have some staples left from the leg surgery          The following portions of the patient's history were reviewed and updated as appropriate: allergies, current medications, past family history, past medical history, past social history, past surgical history and problem list.    Review of Systems   Constitutional:  Negative for activity change, appetite change, chills, diaphoresis, fatigue, fever and unexpected weight change. HENT:  Negative for congestion, dental problem, ear pain, mouth sores, sinus pressure, sinus pain, sore throat and trouble swallowing. Eyes:  Negative for photophobia, discharge and itching. Respiratory:  Negative for apnea, chest tightness and shortness of breath. Cardiovascular:  Negative for chest pain, palpitations and leg swelling. Gastrointestinal:  Negative for abdominal distention, abdominal pain, blood in stool, nausea and vomiting. Endocrine: Negative for cold intolerance, heat intolerance, polydipsia, polyphagia and polyuria. Genitourinary:  Negative for difficulty urinating. Musculoskeletal:  Negative for arthralgias. Skin:  Negative for color change and wound. Neurological:  Negative for dizziness, syncope, speech difficulty and headaches. Hematological:  Negative for adenopathy. Psychiatric/Behavioral:  Negative for agitation and behavioral problems.           Current Outpatient Medications   Medication Sig Dispense Refill   • acetaminophen (TYLENOL) 325 mg tablet Take 2 tablets (650 mg total) by mouth every 6 (six) hours as needed for mild pain  0   • amLODIPine (NORVASC) 10 mg tablet Take 0.5 tablets (5 mg total) by mouth daily 1 tablet 1   • aspirin 81 mg chewable tablet Chew 81 mg daily     • atorvastatin (LIPITOR) 40 mg tablet Take 1 tablet (40 mg total) by mouth daily (Patient taking differently: Take 40 mg by mouth every evening) 90 tablet 3   • Blood Glucose Monitoring Suppl (OneTouch Verio Reflect) w/Device KIT Check blood sugars twice daily. Please substitute with appropriate alternative as covered by patient's insurance.  Dx: E11.65 1 kit 0   • cloNIDine (CATAPRES) 0.1 mg tablet take 1 tablet by mouth every 12 hours 180 tablet 1   • clopidogrel (PLAVIX) 75 mg tablet TAKE 1 TABLET BY MOUTH DAILY 90 tablet 0   • Droplet Pen Needles 32G X 4 MM MISC USE EVERY EVENING 100 each 5   • Elastic Bandages & Supports (Medical Compression Stockings) MISC Use daily Knee High 15-20mmHg 2 each 4   • Empagliflozin 25 MG TABS Take 1 tablet (25 mg total) by mouth daily 90 tablet 1   • fenofibrate 160 MG tablet Take 1 tablet (160 mg total) by mouth daily 90 tablet 3   • gabapentin (NEURONTIN) 600 MG tablet TAKE 1 TABLET BY MOUTH  TWICE DAILY 180 tablet 3   • glucose blood (OneTouch Verio) test strip TEST TWICE A  strip 5   • insulin glargine (LANTUS) 100 units/mL subcutaneous injection Inject 15 Units under the skin daily at bedtime 10 mL 0   • isosorbide mononitrate (IMDUR) 60 mg 24 hr tablet Take 1 tablet (60 mg total) by mouth daily 90 tablet 3   • metoprolol tartrate (LOPRESSOR) 50 mg tablet Take 1 tablet (50 mg total) by mouth every 12 (twelve) hours 180 tablet 3   • Multiple Vitamins-Minerals (MULTIVITAMIN MEN 50+ PO) Take by mouth daily     • nitroglycerin (NITROSTAT) 0.4 mg SL tablet Place 1 tablet (0.4 mg total) under the tongue every 5 (five) minutes as needed for chest pain 30 tablet 3   • Omega-3 Fatty Acids (fish oil) 1,000 mg Take 4,000 mg by mouth 2 (two) times a day     • ondansetron (ZOFRAN) 4 mg tablet Take 4 mg by mouth every 8 (eight) hours as needed for nausea or vomiting     • OneTouch Delica Lancets 25O MISC Check blood sugars twice daily. Please substitute with appropriate alternative as covered by patient's insurance. Dx: E11.65 200 each 3   • ranolazine (RANEXA) 500 mg 12 hr tablet take 1 tablet by mouth twice a day 60 tablet 3   • silver sulfadiazine (SILVADENE,SSD) 1 % cream Apply topically 2 (two) times a day 50 g 1   • sitaGLIPtin (JANUVIA) 100 mg tablet Take 1 tablet (100 mg total) by mouth daily 90 tablet 1   • torsemide (DEMADEX) 20 mg tablet Take 1 tablet (20 mg total) by mouth daily 90 tablet 3     No current facility-administered medications for this visit. Objective:    /62   Pulse 60   Temp 98.5 °F (36.9 °C)   Resp 20   Wt 104 kg (229 lb)   BMI 29.40 kg/m²        Physical Exam  Vitals and nursing note reviewed. Constitutional:       General: He is not in acute distress. Appearance: He is well-developed. He is not diaphoretic. HENT:      Head: Normocephalic and atraumatic. Right Ear: External ear normal.      Left Ear: External ear normal.      Nose: Nose normal.      Mouth/Throat:      Pharynx: No oropharyngeal exudate. Eyes:      General: No scleral icterus. Right eye: No discharge. Left eye: No discharge. Pupils: Pupils are equal, round, and reactive to light. Neck:      Thyroid: No thyromegaly. Cardiovascular:      Rate and Rhythm: Normal rate. Heart sounds: Normal heart sounds. No murmur heard. Pulmonary:      Effort: Pulmonary effort is normal. No respiratory distress. Breath sounds: Normal breath sounds. No wheezing. Abdominal:      General: Bowel sounds are normal. There is no distension. Palpations: Abdomen is soft. There is no mass. Tenderness: There is no abdominal tenderness. There is no guarding or rebound. Musculoskeletal:         General: Normal range of motion. Skin:     General: Skin is warm and dry. Findings: No erythema or rash.       Comments: Small wound on bottom of rt great toe - 1/2 of the width of an eraser   Neurological:      Mental Status: He is alert.       Coordination: Coordination normal.      Deep Tendon Reflexes: Reflexes normal.   Psychiatric:         Behavior: Behavior normal.                Kerline Goodwin, DO

## 2023-12-15 ENCOUNTER — TELEPHONE (OUTPATIENT)
Age: 78
End: 2023-12-15

## 2023-12-15 NOTE — TELEPHONE ENCOUNTER
Called and spoke with  Greyson Brian to see if she was with patient to see if she could send us a picture. She wasn't with the patient but said maybe when his daughter or granddaughter comes there they could send us a picture. I did try calling him but had to leave a message.

## 2023-12-15 NOTE — TELEPHONE ENCOUNTER
Caller: [address] [facility]    Doctor/Office: Dr. Akshat Stevens    #: [phone]    Escalation: Care/calling to let Dr Elenita Andino know that Lubna Stein has a new wound on his right heel that she is treating with the Silvadene used on other area. Please call pt if you want to see him before his 12/26/23 appt/or call Christophe Garcia back at number above if other treatment is preferred for new wound.  Thanks

## 2023-12-15 NOTE — TELEPHONE ENCOUNTER
LMOM to see if she can send a picture of the wound to Dr. Lindaann Cowden phone I left the number on her Voice mail.

## 2023-12-18 ENCOUNTER — TELEPHONE (OUTPATIENT)
Age: 78
End: 2023-12-18

## 2023-12-18 NOTE — TELEPHONE ENCOUNTER
Caller: Thomas Horne    Doctor: Conchita    Reason for call: Per Dr. Arango patient needs to be seen sooner than his 12/26 appointment.    Call back#: [phone]

## 2023-12-20 ENCOUNTER — OFFICE VISIT (OUTPATIENT)
Age: 78
End: 2023-12-20
Payer: COMMERCIAL

## 2023-12-20 VITALS
HEIGHT: 74 IN | WEIGHT: 229 LBS | RESPIRATION RATE: 17 BRPM | SYSTOLIC BLOOD PRESSURE: 141 MMHG | DIASTOLIC BLOOD PRESSURE: 70 MMHG | BODY MASS INDEX: 29.39 KG/M2 | HEART RATE: 56 BPM

## 2023-12-20 DIAGNOSIS — L97.411 DIABETIC ULCER OF RIGHT HEEL ASSOCIATED WITH TYPE 2 DIABETES MELLITUS, LIMITED TO BREAKDOWN OF SKIN (HCC): Primary | ICD-10-CM

## 2023-12-20 DIAGNOSIS — E11.42 DIABETIC POLYNEUROPATHY ASSOCIATED WITH TYPE 2 DIABETES MELLITUS (HCC): ICD-10-CM

## 2023-12-20 DIAGNOSIS — L89.612 PRESSURE INJURY OF RIGHT HEEL, STAGE 2 (HCC): ICD-10-CM

## 2023-12-20 DIAGNOSIS — E11.621 DIABETIC ULCER OF RIGHT HEEL ASSOCIATED WITH TYPE 2 DIABETES MELLITUS, LIMITED TO BREAKDOWN OF SKIN (HCC): Primary | ICD-10-CM

## 2023-12-20 DIAGNOSIS — I70.209 PERIPHERAL ARTERIOSCLEROSIS (HCC): ICD-10-CM

## 2023-12-20 DIAGNOSIS — M86.271 SUBACUTE OSTEOMYELITIS OF RIGHT FOOT (HCC): ICD-10-CM

## 2023-12-20 DIAGNOSIS — L03.115 CELLULITIS OF RIGHT FOOT: ICD-10-CM

## 2023-12-20 PROCEDURE — 99214 OFFICE O/P EST MOD 30 MIN: CPT | Performed by: PODIATRIST

## 2023-12-20 NOTE — PROGRESS NOTES
Assessment/Plan: Diabetic foot ulcer plantar aspect right heel.  Sumner grade 2.  Second wound, decubitus ulcer posterior lateral aspect right heel.  Stage II.  Resolving cellulitis.  Diabetic neuropathy.  Peripheral artery disease.  Rule out osteomyelitis.    Plan.  Chart reviewed.  Nursing notes reviewed.  Primary care notes reviewed.  Patient examined.  Patient advised on condition.  At this time patient is to be referred to the wound healing center.  Today new wound instruction given.  Patient will bandaged with Maxorb and a dry sterile dressing daily.  Finish antibiotic.  Watch for signs of return of infection.  Aftercare instruction given.  Return for follow-up.         Diagnoses and all orders for this visit:    Diabetic ulcer of right heel associated with type 2 diabetes mellitus, limited to breakdown of skin (HCC)  -     Ambulatory Referral to Wound Care; Future    Diabetic polyneuropathy associated with type 2 diabetes mellitus (HCC)    Peripheral arteriosclerosis (HCC)    Pressure injury of right heel, stage 2 (HCC)  -     Ambulatory Referral to Wound Care; Future    Cellulitis of right foot    Subacute osteomyelitis of right foot (HCC)          Subjective: Patient has a new wound.  A wound on the back of his heel of his right foot developed since he was in the hospital.  He is presently taking antibiotic for infection.  Patient is diabetic status post arteriogram for peripheral artery disease.    Allergies   Allergen Reactions    Lisinopril Rash and Lip Swelling         Current Outpatient Medications:     acetaminophen (TYLENOL) 325 mg tablet, Take 2 tablets (650 mg total) by mouth every 6 (six) hours as needed for mild pain, Disp: , Rfl: 0    amLODIPine (NORVASC) 10 mg tablet, Take 0.5 tablets (5 mg total) by mouth daily, Disp: 1 tablet, Rfl: 1    aspirin 81 mg chewable tablet, Chew 81 mg daily, Disp: , Rfl:     atorvastatin (LIPITOR) 40 mg tablet, Take 1 tablet (40 mg total) by mouth daily (Patient  taking differently: Take 40 mg by mouth every evening), Disp: 90 tablet, Rfl: 3    Blood Glucose Monitoring Suppl (OneTouch Verio Reflect) w/Device KIT, Check blood sugars twice daily. Please substitute with appropriate alternative as covered by patient's insurance. Dx: E11.65, Disp: 1 kit, Rfl: 0    cloNIDine (CATAPRES) 0.1 mg tablet, take 1 tablet by mouth every 12 hours, Disp: 180 tablet, Rfl: 1    clopidogrel (PLAVIX) 75 mg tablet, TAKE 1 TABLET BY MOUTH DAILY, Disp: 90 tablet, Rfl: 0    Droplet Pen Needles 32G X 4 MM MISC, USE EVERY EVENING, Disp: 100 each, Rfl: 5    Elastic Bandages & Supports (Medical Compression Stockings) MISC, Use daily Knee High 15-20mmHg, Disp: 2 each, Rfl: 4    Empagliflozin 25 MG TABS, Take 1 tablet (25 mg total) by mouth daily, Disp: 90 tablet, Rfl: 1    fenofibrate 160 MG tablet, Take 1 tablet (160 mg total) by mouth daily, Disp: 90 tablet, Rfl: 3    gabapentin (NEURONTIN) 600 MG tablet, TAKE 1 TABLET BY MOUTH  TWICE DAILY, Disp: 180 tablet, Rfl: 3    glucose blood (OneTouch Verio) test strip, TEST TWICE A DAY, Disp: 200 strip, Rfl: 5    insulin glargine (LANTUS) 100 units/mL subcutaneous injection, Inject 15 Units under the skin daily at bedtime, Disp: 10 mL, Rfl: 0    isosorbide mononitrate (IMDUR) 60 mg 24 hr tablet, Take 1 tablet (60 mg total) by mouth daily, Disp: 90 tablet, Rfl: 3    metoprolol tartrate (LOPRESSOR) 50 mg tablet, Take 1 tablet (50 mg total) by mouth every 12 (twelve) hours, Disp: 180 tablet, Rfl: 3    Multiple Vitamins-Minerals (MULTIVITAMIN MEN 50+ PO), Take by mouth daily, Disp: , Rfl:     Omega-3 Fatty Acids (fish oil) 1,000 mg, Take 4,000 mg by mouth 2 (two) times a day, Disp: , Rfl:     ondansetron (ZOFRAN) 4 mg tablet, Take 4 mg by mouth every 8 (eight) hours as needed for nausea or vomiting, Disp: , Rfl:     OneTouch Delica Lancets 33G MISC, Check blood sugars twice daily. Please substitute with appropriate alternative as covered by patient's insurance.  Dx: E11.65, Disp: 200 each, Rfl: 3    ranolazine (RANEXA) 500 mg 12 hr tablet, take 1 tablet by mouth twice a day, Disp: 60 tablet, Rfl: 3    silver sulfadiazine (SILVADENE,SSD) 1 % cream, Apply topically 2 (two) times a day, Disp: 50 g, Rfl: 1    sitaGLIPtin (JANUVIA) 100 mg tablet, Take 1 tablet (100 mg total) by mouth daily, Disp: 90 tablet, Rfl: 1    nitroglycerin (NITROSTAT) 0.4 mg SL tablet, Place 1 tablet (0.4 mg total) under the tongue every 5 (five) minutes as needed for chest pain (Patient not taking: Reported on 12/20/2023), Disp: 30 tablet, Rfl: 3    torsemide (DEMADEX) 20 mg tablet, Take 1 tablet (20 mg total) by mouth daily, Disp: 90 tablet, Rfl: 3    Patient Active Problem List   Diagnosis    Mixed hyperlipidemia    Primary hypertension    Coronary artery disease involving native coronary artery of native heart without angina pectoris    S/P angioplasty with stent    Hx of CABG    S/P AAA repair    S/P prostatectomy    H/O prostate cancer    Type 2 diabetes mellitus with diabetic polyneuropathy, with long-term current use of insulin (HCC)    Varicose veins of left lower extremity    History of endovascular stent graft for abdominal aortic aneurysm (AAA)    Bruit (arterial)    Peripheral artery disease (HCC)    Type 2 diabetes mellitus with diabetic peripheral angiopathy without gangrene, with long-term current use of insulin (HCC)    Actinic keratoses    Stage 3b chronic kidney disease (HCC)    Platelets decreased (HCC)    Gross hematuria    Chronic HFpEF/Moderate pHTN (HFpEF) (HCC)    Mild aortic stenosis    Chronic kidney disease-mineral and bone disorder    Stable angina pectoris    Hypertensive urgency    Elevated troponin level not due myocardial infarction    Diabetic ulcer of right midfoot associated with diabetes mellitus due to underlying condition, limited to breakdown of skin (HCC)    Acute on chronic diastolic heart failure (HCC)    Frequent PVCs    Acute respiratory distress           Patient ID: Thomas Horne is a 78 y.o. male.    HPI    The following portions of the patient's history were reviewed and updated as appropriate:     family history includes Alcohol abuse in his father; Diabetes in his mother.      reports that he quit smoking about 35 years ago. His smoking use included cigarettes. He started smoking about 68 years ago. He has a 49.5 pack-year smoking history. He has never used smokeless tobacco. He reports current alcohol use of about 14.0 standard drinks of alcohol per week. He reports that he does not use drugs.    Vitals:    12/20/23 1252   BP: 141/70   Pulse: 56   Resp: 17       Review of Systems      Objective:  Patient's shoes and socks removed.   Foot ExamPhysical Exam        General  General Appearance: appears stated age and healthy   Orientation: alert and oriented to person, place, and time   Affect: appropriate   Gait: antalgic         Right Foot/Ankle      Inspection and Palpation  Tenderness: metatarsals   Swelling: none   Arch: pes planus  Claw Toes: fifth toe  Hallux limitus: yes  Skin Exam: callus, dry skin and tinea;      Neurovascular  Dorsalis pedis: 1+  Posterior tibial: 1+  Saphenous nerve sensation: absent  Tibial nerve sensation: absent  Superficial peroneal nerve sensation: absent  Deep peroneal nerve sensation: absent  Sural nerve sensation: absent        Left Foot/Ankle       Inspection and Palpation  Tenderness: metatarsals   Swelling: none   Arch: pes planus  Claw toes: second toe and fifth toe  Hallux limitus: yes  Skin Exam: callus, dry skin and tinea;      Neurovascular  Dorsalis pedis: 1+  Posterior tibial: 1+  Saphenous nerve sensation: absent  Tibial nerve sensation: absent  Superficial peroneal nerve sensation: absent  Deep peroneal nerve sensation: absent  Sural nerve sensation: absent           Physical Exam  Vitals signs and nursing note reviewed.   Cardiovascular:      Rate and Rhythm: Normal rate and regular rhythm.      Pulses:            Dorsalis pedis pulses are 1+ on the right side and 1+ on the left side.        Posterior tibial pulses are 1+ on the right side and 1+ on the left side.   Feet:      Right foot:      Skin integrity: Callus and dry skin present.      Left foot:      Skin integrity: Callus and dry skin present.   Skin:     Capillary Refill: Capillary refill takes 2 to 3 seconds.      Comments:   All nails are thick and mycotic.  Patient demonstrates bilateral dermatophytosis.  There is a pre ulcerative/ corn on the plantar aspect 2nd left toe.    Right Foot/Ankle   Right Foot Inspection  Skin Exam: dry skin, callus and callus                                 Vascular     The right DP pulse is 1+. The right PT pulse is 1+.   Right Toe  - Comprehensive Exam  Arch: pes planus  Claw Toes: fifth toe  Hallux limitus: yes  Swelling: none   Tenderness: metatarsals            Left Foot/Ankle  Left Foot Inspection  Skin Exam: dry skin and callus                                             Vascular     The left DP pulse is 1+. The left PT pulse is 1+.   Left Toe  - Comprehensive Exam  Arch: pes planus  Claw toes: second toe and fifth toe  Hallux limitus: yes  Swelling: none   Tenderness: metatarsals      Patient's shoes and socks removed.Assign Risk Category:  Deformity present; Loss of protective sensation; Weak pulses       Risk: 2        Patient has profound dermatophytosis and xerosis of the foot bilateral.  There is a large Sumner grade 2 ulcer on the plantar aspect of the right heel.  Wound is approxione 0.5 cm² in size.  No evidence of cellulitis at this time.  Palpable plantar spurring noted.  Rule out osteomyelitis.    New wound #2.  Posterior lateral aspect right heel demonstrates 1.2 cm² full-thickness ulcer/decubitus ulcer stage II.  Surrounding cellulitis appears to be resolved compared to last picture.

## 2023-12-21 ENCOUNTER — OFFICE VISIT (OUTPATIENT)
Dept: VASCULAR SURGERY | Facility: CLINIC | Age: 78
End: 2023-12-21

## 2023-12-21 ENCOUNTER — TRANSCRIBE ORDERS (OUTPATIENT)
Dept: VASCULAR SURGERY | Facility: CLINIC | Age: 78
End: 2023-12-21

## 2023-12-21 ENCOUNTER — OFFICE VISIT (OUTPATIENT)
Dept: WOUND CARE | Facility: HOSPITAL | Age: 78
End: 2023-12-21
Payer: COMMERCIAL

## 2023-12-21 VITALS
RESPIRATION RATE: 16 BRPM | TEMPERATURE: 97.8 F | WEIGHT: 223 LBS | SYSTOLIC BLOOD PRESSURE: 160 MMHG | HEIGHT: 74 IN | HEART RATE: 65 BPM | DIASTOLIC BLOOD PRESSURE: 82 MMHG | BODY MASS INDEX: 28.62 KG/M2

## 2023-12-21 VITALS
BODY MASS INDEX: 29.26 KG/M2 | HEIGHT: 74 IN | DIASTOLIC BLOOD PRESSURE: 70 MMHG | SYSTOLIC BLOOD PRESSURE: 140 MMHG | WEIGHT: 228 LBS

## 2023-12-21 DIAGNOSIS — E11.51 TYPE 2 DIABETES MELLITUS WITH DIABETIC PERIPHERAL ANGIOPATHY WITHOUT GANGRENE, WITH LONG-TERM CURRENT USE OF INSULIN (HCC): ICD-10-CM

## 2023-12-21 DIAGNOSIS — L97.411 DIABETIC ULCER OF RIGHT MIDFOOT ASSOCIATED WITH DIABETES MELLITUS DUE TO UNDERLYING CONDITION, LIMITED TO BREAKDOWN OF SKIN (HCC): Primary | ICD-10-CM

## 2023-12-21 DIAGNOSIS — E08.621 DIABETIC ULCER OF RIGHT MIDFOOT ASSOCIATED WITH DIABETES MELLITUS DUE TO UNDERLYING CONDITION, LIMITED TO BREAKDOWN OF SKIN (HCC): Primary | ICD-10-CM

## 2023-12-21 DIAGNOSIS — E08.621 DIABETIC ULCER OF RIGHT MIDFOOT ASSOCIATED WITH DIABETES MELLITUS DUE TO UNDERLYING CONDITION, LIMITED TO BREAKDOWN OF SKIN (HCC): ICD-10-CM

## 2023-12-21 DIAGNOSIS — I50.32 CHRONIC HEART FAILURE WITH PRESERVED EJECTION FRACTION (HFPEF) (HCC): ICD-10-CM

## 2023-12-21 DIAGNOSIS — I73.9 PERIPHERAL ARTERY DISEASE (HCC): ICD-10-CM

## 2023-12-21 DIAGNOSIS — L97.411 DIABETIC ULCER OF RIGHT MIDFOOT ASSOCIATED WITH DIABETES MELLITUS DUE TO UNDERLYING CONDITION, LIMITED TO BREAKDOWN OF SKIN (HCC): ICD-10-CM

## 2023-12-21 DIAGNOSIS — Z79.4 TYPE 2 DIABETES MELLITUS WITH DIABETIC PERIPHERAL ANGIOPATHY WITHOUT GANGRENE, WITH LONG-TERM CURRENT USE OF INSULIN (HCC): ICD-10-CM

## 2023-12-21 PROCEDURE — 99213 OFFICE O/P EST LOW 20 MIN: CPT | Performed by: STUDENT IN AN ORGANIZED HEALTH CARE EDUCATION/TRAINING PROGRAM

## 2023-12-21 PROCEDURE — 97597 DBRDMT OPN WND 1ST 20 CM/<: CPT | Performed by: STUDENT IN AN ORGANIZED HEALTH CARE EDUCATION/TRAINING PROGRAM

## 2023-12-21 PROCEDURE — 99024 POSTOP FOLLOW-UP VISIT: CPT | Performed by: SURGERY

## 2023-12-21 PROCEDURE — 99204 OFFICE O/P NEW MOD 45 MIN: CPT | Performed by: STUDENT IN AN ORGANIZED HEALTH CARE EDUCATION/TRAINING PROGRAM

## 2023-12-21 RX ORDER — OMEPRAZOLE 20 MG/1
CAPSULE, DELAYED RELEASE ORAL
COMMUNITY
Start: 2023-12-01 | End: 2023-12-27

## 2023-12-21 NOTE — PATIENT INSTRUCTIONS
Orders Placed This Encounter   Procedures    Wound miscellaneous orders     Protein: Eat protein with each meal to promote healing.  Examples of protein are fish, meat, chicken, nuts, peanut butter, eggs, lentils, edamame or a protein shake.     Standing Status:   Future     Standing Expiration Date:   12/21/2024    Wound miscellaneous orders     Wound infection:  If you have signs of infection please call the wound center.  If the wound center is closed- please go to the Emergency department.  Some signs of infection:  fever, chills, increased redness, red streaks, increase in pain, increased drainage.  Drainage with an odor, Change in drainage color: white/milky/green/tan/yellow,  an increase in swelling, chest pain and/or shortness of breath.     Standing Status:   Future     Standing Expiration Date:   12/21/2024    Wound cleansing and dressings     Wash your hands with soap and water.  Remove old dressing, discard into plastic bag and place in trash.  Cleanse the wound with soap and water prior to applying a clean dressing. Do not use tissue or cotton balls. Do not scrub the wound. Pat dry using gauze.  Shower yes   Apply Dermagran to the foot wounds.  Cover with gauze  Secure with rolled gauze and tape   Change dressing every other day     Standing Status:   Future     Standing Expiration Date:   12/21/2024    Wound home care     [facility] to see patient 2-3 times a week for wound care.     Standing Status:   Future     Standing Expiration Date:   12/21/2024

## 2023-12-21 NOTE — ASSESSMENT & PLAN NOTE
Lab Results   Component Value Date    HGBA1C 7.0 (H) 10/04/2023   The patient has returned for an interval evaluation of his right lower extremity.  I am very happy with the appearance of his foot now status post debridement with very good podiatric care.  He has multiphasic signal over his peroneal artery, popliteal fossa likely consistent with the patency of his bypass as well as the dorsalis pedis artery.  The posterior tibial artery is monophasic.  His wounds have demonstrated significant improvement since my last visit with him.  He was post to have an arterial duplex by this time, we will reschedule for him.

## 2023-12-21 NOTE — PROGRESS NOTES
"Patient ID: Thomas Horne is a 78 y.o. male Date of Birth 1945     Chief Complaint  Chief Complaint   Patient presents with    New Patient Visit     Diabetic foot wounds       Allergies  Lisinopril    Assessment:     Diagnoses and all orders for this visit:    Diabetic ulcer of right midfoot associated with diabetes mellitus due to underlying condition, limited to breakdown of skin (HCC)  -     Wound miscellaneous orders; Future  -     Wound miscellaneous orders; Future  -     Wound cleansing and dressings; Future  -     Wound home care; Future    Type 2 diabetes mellitus with diabetic peripheral angiopathy without gangrene, with long-term current use of insulin (HCC)    Peripheral artery disease (HCC)    Chronic HFpEF/Moderate pHTN (HFpEF) (HCC)              Debridement   Wound 12/21/23 Diabetic Ulcer Foot Right;Plantar    Universal Protocol:  Consent: Verbal consent obtained.  Risks and benefits: risks, benefits and alternatives were discussed  Consent given by: patient  Time out: Immediately prior to procedure a \"time out\" was called to verify the correct patient, procedure, equipment, support staff and site/side marked as required.  Patient identity confirmed: verbally with patient    Debridement Details  Performed by: physician  Debridement type: selective  Pain control: lidocaine 4%      Post-debridement measurements  Length (cm): 0.2  Width (cm): 0.3  Depth (cm): 0.2  Percent debrided: 90%  Surface Area (cm^2): 0.06  Area Debrided (cm^2): 0.05  Volume (cm^3): 0.01    Devitalized tissue debrided: biofilm and exudate  Instrument(s) utilized: curette  Bleeding: small  Hemostasis obtained with: pressure  Procedural pain (0-10): 1  Post-procedural pain: 0   Response to treatment: procedure was tolerated well    Debridement   Wound 12/21/23 Diabetic Ulcer Ankle Posterior;Right    Universal Protocol:  Consent: Verbal consent obtained.  Risks and benefits: risks, benefits and alternatives were discussed  Consent " "given by: patient  Time out: Immediately prior to procedure a \"time out\" was called to verify the correct patient, procedure, equipment, support staff and site/side marked as required.  Patient identity confirmed: verbally with patient    Debridement Details  Performed by: physician  Debridement type: selective  Pain control: lidocaine 4%      Post-debridement measurements  Length (cm): 0.7  Width (cm): 0.7  Depth (cm): 0.1  Percent debrided: 90%  Surface Area (cm^2): 0.49  Area Debrided (cm^2): 0.44  Volume (cm^3): 0.05    Devitalized tissue debrided: biofilm and exudate  Instrument(s) utilized: curette  Bleeding: small  Hemostasis obtained with: pressure  Procedural pain (0-10): 1  Post-procedural pain: 0   Response to treatment: procedure was tolerated well        Plan:   It was a pleasure to see Thomas Horne for wound care consult today  Selective debridement performed today as above to both wounds  Start plan of care as noted below with dermagran   A1C results reviewed with the patient today.   Follow up with vascular surgery as recommended.  Advised to wear this looser pants at next appointment as we will either apply  a football dressing or total contact cast.  Advised that as this is his right foot he should have someone drive him to his appointment.  No signs or symptoms of infection today. Patient understands that if any signs of infection start (such as increased redness, drainage, pain, fever, chills, diaphoresis), they should call our office or proceed to the ER or Urgent Care.  Patient should continue a high protein diet to facilitate wound healing  Patient is advised to not submerge wound or leave wound open to air.  Follow up in 2 weeks  Given the multi-factorial nature of wound care, additional time was taken to review patient's treatment plan with other specialties and most recent pertinent lab work and imaging.   All plans of care discussed with patient at bedside who verbalized understanding with " treatment plan.    Wound 12/21/23 Diabetic Ulcer Foot Right;Plantar (Active)   Wound Image Images linked 12/21/23 1000   Wound Description Other (Comment) (unable to visualize due to purple marker) 12/21/23 0959   Clara-wound Assessment Dry;Pink 12/21/23 0959   Wound Length (cm) 0.2 cm 12/21/23 0959   Wound Width (cm) 0.3 cm 12/21/23 0959   Wound Depth (cm) 0.2 cm 12/21/23 0959   Wound Surface Area (cm^2) 0.06 cm^2 12/21/23 0959   Wound Volume (cm^3) 0.012 cm^3 12/21/23 0959   Calculated Wound Volume (cm^3) 0.01 cm^3 12/21/23 0959   Drainage Amount Scant 12/21/23 0959   Drainage Description Serous 12/21/23 0959   Non-staged Wound Description Full thickness 12/21/23 0959   Dressing Status Intact 12/21/23 0959       Wound 12/21/23 Diabetic Ulcer Ankle Posterior;Right (Active)   Wound Image Images linked 12/21/23 0957   Wound Description Other (Comment) (unable to visualize due to purple over wound) 12/21/23 0956   Claar-wound Assessment Dry;Pink 12/21/23 0956   Wound Length (cm) 0.7 cm 12/21/23 0956   Wound Width (cm) 0.7 cm 12/21/23 0956   Wound Depth (cm) 0.1 cm 12/21/23 0956   Wound Surface Area (cm^2) 0.49 cm^2 12/21/23 0956   Wound Volume (cm^3) 0.049 cm^3 12/21/23 0956   Calculated Wound Volume (cm^3) 0.05 cm^3 12/21/23 0956   Drainage Amount Scant 12/21/23 0956   Drainage Description Yellow 12/21/23 0956   Non-staged Wound Description Full thickness 12/21/23 0956   Dressing Status Intact 12/21/23 0956       Wound 11/16/23 Leg Right (Active)   Date First Assessed/Time First Assessed: 11/16/23 1440   Location: Leg  Wound Location Orientation: Right  Incision's 1st Dressing: KERLIX (x0), DRESSING MEPILEX AG BORDER POST-OP 4 X 8 IN (x1), ACE WRAP 6 IN STERILE (x0)       Wound 11/16/23 Groin Right (Active)   Date First Assessed/Time First Assessed: 11/16/23 1440   Location: Groin  Wound Location Orientation: Right  Incision's 1st Dressing: ADHESIVE SKIN HIGH VISCOSITY EXOFIN 1ML (x0), DRESSING MEPILEX AG BORDER  POST-OP 4 X 6 IN (x1)       Wound 12/21/23 Diabetic Ulcer Foot Right;Plantar (Active)   Date First Assessed/Time First Assessed: 12/21/23 0948   Primary Wound Type: Diabetic Ulcer  Location: Foot  Wound Location Orientation: Right;Plantar  Wound Description (Comments): MACDONALD 2       Wound 12/21/23 Diabetic Ulcer Ankle Posterior;Right (Active)   Date First Assessed/Time First Assessed: 12/21/23 0954   Primary Wound Type: Diabetic Ulcer  Location: Ankle  Wound Location Orientation: Posterior;Right  Wound Description (Comments): MACDONALD 2       [REMOVED] Wound 11/16/23   Heel Anterior;Right (Removed)   Resolved Date: 12/21/23  Date First Assessed/Time First Assessed: 11/16/23 0700   Primary Wound Type: (c)   Traumatic Wound Type: (c)   Location: Heel  Wound Location Orientation: Anterior;Right  Dressing Status: Intact;Dry;Clean  Wound Outcome: (c) O...       Subjective:      .    12/21/23: Consult - Thomas is a pleasant 78-year-old male with a past medical history of type 2 diabetes mellitus most recent hemoglobin A1c 7.0% 2 months ago, diabetic polyneuropathy, HfpEF G2DD, peripheral arterial disease with history of revascularization most recently mild peroneal bypass to the right lower extremity., CKD, coronary artery disease history of CABG today for initial wound care consult regarding right heel wound which has been with patient for several months and being followed by podiatry.  Insult appears to have occurred  when patient stepped on a nail several months ago.  Patient follows with podiatry for diabetic footcare as well as wound care.  Was most recently seen yesterday at their office.  Per chart review, patient also does follow with vascular surgery following the recent bypass he had on 11/16/2023..  This was done with CryoVein and it was recommended that wound care would be optimized quickly for a short period of time due to short patency of the CryoVein conduits.  Patient has next follow-up appointment later  today with vascular surgery.      MRI R foot 10/26/23:    No evidence of osteomyelitis.   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          The following portions of the patient's history were reviewed and updated as appropriate: allergies, current medications, past family history, past medical history, past social history, past surgical history, and problem list.    Review of Systems   Constitutional:  Negative for chills, diaphoresis and fever.   Skin:  Positive for wound.   All other systems reviewed and are negative.        Objective:       Wound 12/21/23 Diabetic Ulcer Foot Right;Plantar (Active)   Wound Image Images linked 12/21/23 1000   Wound Description Other (Comment) (unable to visualize due to purple marker) 12/21/23 0959   Clara-wound Assessment Dry;Pink 12/21/23 0959   Wound Length (cm) 0.2 cm 12/21/23 0959   Wound Width (cm) 0.3 cm 12/21/23 0959   Wound Depth (cm) 0.2 cm 12/21/23 0959   Wound Surface Area (cm^2) 0.06 cm^2 12/21/23 0959   Wound Volume (cm^3) 0.012 cm^3 12/21/23 0959   Calculated Wound Volume (cm^3) 0.01 cm^3 12/21/23 0959   Drainage Amount Scant 12/21/23 0959   Drainage Description Serous 12/21/23 0959   Non-staged Wound Description Full thickness 12/21/23 0959   Dressing Status Intact 12/21/23 0959       Wound 12/21/23 Diabetic Ulcer Ankle Posterior;Right (Active)   Wound Image Images linked 12/21/23 0957   Wound Description Other (Comment) (unable to visualize due to purple over wound) 12/21/23 0956   Clara-wound Assessment Dry;Pink 12/21/23 0956   Wound Length (cm) 0.7 cm 12/21/23 0956   Wound Width (cm) 0.7 cm 12/21/23 0956   Wound Depth (cm) 0.1 cm 12/21/23 0956   Wound Surface Area (cm^2) 0.49 cm^2 12/21/23 0956   Wound Volume (cm^3) 0.049 cm^3 12/21/23 0956   Calculated Wound Volume (cm^3) 0.05 cm^3 12/21/23 0956   Drainage Amount Scant 12/21/23 0956   Drainage Description Yellow 12/21/23 0956   Non-staged Wound Description Full thickness  "12/21/23 0956   Dressing Status Intact 12/21/23 0956       /82   Pulse 65   Temp 97.8 °F (36.6 °C)   Resp 16   Ht 6' 2\" (1.88 m)   Wt 101 kg (223 lb)   BMI 28.63 kg/m²     Physical Exam  Vitals reviewed.   Constitutional:       Appearance: Normal appearance.   HENT:      Head: Normocephalic and atraumatic.   Eyes:      Extraocular Movements: Extraocular movements intact.   Pulmonary:      Effort: Pulmonary effort is normal.   Musculoskeletal:      Cervical back: Neck supple.   Skin:     Comments: 2 open wounds noted on right foot, 1 of posterior heel and 1 of proximal plantar heel.  Both of these are Sumner 2 with no overt signs of infection.  No deep tissue involvement noted.  Difficult to appreciate wound bed as wounds were painted with gentian violet.   Neurological:      Mental Status: He is alert.   Psychiatric:         Mood and Affect: Mood normal.                 Wound Instructions:  Orders Placed This Encounter   Procedures    Wound miscellaneous orders     Protein: Eat protein with each meal to promote healing.  Examples of protein are fish, meat, chicken, nuts, peanut butter, eggs, lentils, edamame or a protein shake.     Standing Status:   Future     Standing Expiration Date:   12/21/2024    Wound miscellaneous orders     Wound infection:  If you have signs of infection please call the wound center.  If the wound center is closed- please go to the Emergency department.  Some signs of infection:  fever, chills, increased redness, red streaks, increase in pain, increased drainage.  Drainage with an odor, Change in drainage color: white/milky/green/tan/yellow,  an increase in swelling, chest pain and/or shortness of breath.     Standing Status:   Future     Standing Expiration Date:   12/21/2024    Wound cleansing and dressings     Wash your hands with soap and water.  Remove old dressing, discard into plastic bag and place in trash.  Cleanse the wound with soap and water prior to applying a " "clean dressing. Do not use tissue or cotton balls. Do not scrub the wound. Pat dry using gauze.  Shower yes   Apply Dermagran to the foot wounds.  Cover with gauze  Secure with rolled gauze and tape   Change dressing every other day     Standing Status:   Future     Standing Expiration Date:   12/21/2024    Wound home care     [facility] to see patient 2-3 times a week for wound care.     Standing Status:   Future     Standing Expiration Date:   12/21/2024        Diagnosis ICD-10-CM Associated Orders   1. Diabetic ulcer of right midfoot associated with diabetes mellitus due to underlying condition, limited to breakdown of skin ([facility])  E08.621 Wound miscellaneous orders    L97.411 Wound miscellaneous orders     Wound cleansing and dressings     Wound home care      2. Type 2 diabetes mellitus with diabetic peripheral angiopathy without gangrene, with long-term current use of insulin ([facility])  E11.51     Z79.4       3. Peripheral artery disease ([facility])  I73.9       4. Chronic HFpEF/Moderate pHTN (HFpEF) ([facility])  I50.32           --  David Frye MD    \"This note has been constructed using a voice recognition system. Therefore there may be syntax, spelling, and/or grammatical errors. Occasional wrong word or \"sound alike\" substitutions may have occurred due to the inherent limitations of voice recognition software. Read the chart carefully and recognize, using context, where substitutions have occurred. Please call if you have any questions.\"     "

## 2023-12-21 NOTE — PATIENT INSTRUCTIONS
1. Diabetic ulcer of right midfoot associated with diabetes mellitus due to underlying condition, limited to breakdown of skin (HCC)  Assessment & Plan:    Lab Results   Component Value Date    HGBA1C 7.0 (H) 10/04/2023   The patient has returned for an interval evaluation of his right lower extremity.  I am very happy with the appearance of his foot now status post debridement with very good podiatric care.  He has multiphasic signal over his peroneal artery, popliteal fossa likely consistent with the patency of his bypass as well as the dorsalis pedis artery.  The posterior tibial artery is monophasic.  His wounds have demonstrated significant improvement since my last visit with him.  He was post to have an arterial duplex by this time, we will reschedule for him.    Orders:  -     Ambulatory Referral to Physical Therapy; Future  -     VAS lower limb arterial duplex, complete bilateral; Future; Expected date: 06/21/2024

## 2023-12-21 NOTE — LETTER
December 21, 2023     Frank Lombardi, DO  [address]  [address]  [place] [facility] [address]    Patient: Thomas Horne   YOB: 1945   Date of Visit: 12/21/2023       Dear Dr. Lombardi:    Thank you for referring Thomas Horne to me for evaluation. Below are my notes for this consultation.    If you have questions, please do not hesitate to call me. I look forward to following your patient along with you.         Sincerely,        Vasquez Clark MD        CC: Thomas Horne    Vasquez Clark MD  12/21/2023 11:37 AM  Sign when Signing Visit  Assessment/Plan:    Diabetic ulcer of right midfoot associated with diabetes mellitus due to underlying condition, limited to breakdown of skin (HCC)    Lab Results   Component Value Date    HGBA1C 7.0 (H) 10/04/2023   The patient has returned for an interval evaluation of his right lower extremity.  I am very happy with the appearance of his foot now status post debridement with very good podiatric care.  He has multiphasic signal over his peroneal artery, popliteal fossa likely consistent with the patency of his bypass as well as the dorsalis pedis artery.  The posterior tibial artery is monophasic.  His wounds have demonstrated significant improvement since my last visit with him.  He was post to have an arterial duplex by this time, we will reschedule for him.    Subjective:      Patient ID: Thomas Horne is a 78 y.o. male.    Patient presents s/p R fem-pop bypass w/ R femoral endarterectomy performed by Dr. Clark on 11/16/23. Pt is seeing wound care for wounds on R heel. He reports occasional pain and constant numbness/tingling sensations in R foot. He is taking ASA81, Atorvastatin and Plavix. He is a former smoker.     HPI  Mr. Horne is a very pleasant 78-year-old male with a right foot wound from stepping on a nail back in the spring.  He has neuropathy and did not notice it initially.  It failed to heal and he was taken for a right  "femoral to peroneal artery bypass.  He has done quite well since that time despite my initial concerns for his heel requiring debridement.  He underwent debridement earlier today, the index wound on the plantar surface of the heel appears much better.  There is a small wound at the medial aspect of the right heel which was also debrided.  The signal in his feet is multiphasic.  He has a strong multiphasic signal over the popliteal fossa.  I suspect this is consistent with patency of his bypass.  He has no wound concerns in his groin.  His staples were removed and his infrapopliteal incision appears to be healing well.  He did not obtain a duplex of the right lower extremity, we will reorder/reschedule this for him today.  I would like to see these results at the 1 month interval.    Review of Systems   Skin:  Positive for wound.   All other systems reviewed and are negative.        Objective:      /70 (BP Location: Left arm, Patient Position: Sitting, Cuff Size: Standard)   Ht 6' 2\" (1.88 m)   Wt 103 kg (228 lb)   BMI 29.27 kg/m²          Physical Exam  Constitutional:       Appearance: Normal appearance.   HENT:      Head: Normocephalic and atraumatic.      Nose: Nose normal. No rhinorrhea.   Eyes:      Extraocular Movements: Extraocular movements intact.      Pupils: Pupils are equal, round, and reactive to light.   Cardiovascular:      Rate and Rhythm: Normal rate and regular rhythm.      Pulses:           Dorsalis pedis pulses are detected w/ Doppler on the right side.        Posterior tibial pulses are detected w/ Doppler on the right side.      Comments: Multiphasic signal just anterior to the fibula consistent with the peroneal artery.  There is also multiphasic signal over the popliteal fossa consistent with patency of his new bypass.  The patient has demonstrated improved wound healing with a small defect at the medial aspect of the right heel still present.  Pulmonary:      Effort: Pulmonary " effort is normal.      Breath sounds: No stridor.   Abdominal:      General: There is no distension.      Tenderness: There is no abdominal tenderness.   Musculoskeletal:         General: No tenderness. Normal range of motion.      Cervical back: Normal range of motion and neck supple.   Skin:     General: Skin is warm.      Capillary Refill: Capillary refill takes less than 2 seconds.      Coloration: Skin is not jaundiced.   Neurological:      General: No focal deficit present.      Mental Status: He is alert and oriented to person, place, and time.   Psychiatric:         Mood and Affect: Mood normal.         Behavior: Behavior normal.

## 2023-12-21 NOTE — PROGRESS NOTES
Assessment/Plan:    Diabetic ulcer of right midfoot associated with diabetes mellitus due to underlying condition, limited to breakdown of skin (HCC)    Lab Results   Component Value Date    HGBA1C 7.0 (H) 10/04/2023   The patient has returned for an interval evaluation of his right lower extremity.  I am very happy with the appearance of his foot now status post debridement with very good podiatric care.  He has multiphasic signal over his peroneal artery, popliteal fossa likely consistent with the patency of his bypass as well as the dorsalis pedis artery.  The posterior tibial artery is monophasic.  His wounds have demonstrated significant improvement since my last visit with him.  He was post to have an arterial duplex by this time, we will reschedule for him.    Subjective:      Patient ID: Thomas Horne is a 78 y.o. male.    Patient presents s/p R fem-pop bypass w/ R femoral endarterectomy performed by Dr. Clark on 11/16/23. Pt is seeing wound care for wounds on R heel. He reports occasional pain and constant numbness/tingling sensations in R foot. He is taking ASA81, Atorvastatin and Plavix. He is a former smoker.     HPI  Mr. Horne is a very pleasant 78-year-old male with a right foot wound from stepping on a nail back in the spring.  He has neuropathy and did not notice it initially.  It failed to heal and he was taken for a right femoral to peroneal artery bypass.  He has done quite well since that time despite my initial concerns for his heel requiring debridement.  He underwent debridement earlier today, the index wound on the plantar surface of the heel appears much better.  There is a small wound at the medial aspect of the right heel which was also debrided.  The signal in his feet is multiphasic.  He has a strong multiphasic signal over the popliteal fossa.  I suspect this is consistent with patency of his bypass.  He has no wound concerns in his groin.  His staples were removed and his  "infrapopliteal incision appears to be healing well.  He did not obtain a duplex of the right lower extremity, we will reorder/reschedule this for him today.  I would like to see these results at the 1 month interval.    Review of Systems   Skin:  Positive for wound.   All other systems reviewed and are negative.        Objective:      /70 (BP Location: Left arm, Patient Position: Sitting, Cuff Size: Standard)   Ht 6' 2\" (1.88 m)   Wt 103 kg (228 lb)   BMI 29.27 kg/m²          Physical Exam  Constitutional:       Appearance: Normal appearance.   HENT:      Head: Normocephalic and atraumatic.      Nose: Nose normal. No rhinorrhea.   Eyes:      Extraocular Movements: Extraocular movements intact.      Pupils: Pupils are equal, round, and reactive to light.   Cardiovascular:      Rate and Rhythm: Normal rate and regular rhythm.      Pulses:           Dorsalis pedis pulses are detected w/ Doppler on the right side.        Posterior tibial pulses are detected w/ Doppler on the right side.      Comments: Multiphasic signal just anterior to the fibula consistent with the peroneal artery.  There is also multiphasic signal over the popliteal fossa consistent with patency of his new bypass.  The patient has demonstrated improved wound healing with a small defect at the medial aspect of the right heel still present.  Pulmonary:      Effort: Pulmonary effort is normal.      Breath sounds: No stridor.   Abdominal:      General: There is no distension.      Tenderness: There is no abdominal tenderness.   Musculoskeletal:         General: No tenderness. Normal range of motion.      Cervical back: Normal range of motion and neck supple.   Skin:     General: Skin is warm.      Capillary Refill: Capillary refill takes less than 2 seconds.      Coloration: Skin is not jaundiced.   Neurological:      General: No focal deficit present.      Mental Status: He is alert and oriented to person, place, and time.   Psychiatric:        "  Mood and Affect: Mood normal.         Behavior: Behavior normal.

## 2023-12-25 ENCOUNTER — HOSPITAL ENCOUNTER (INPATIENT)
Facility: HOSPITAL | Age: 78
LOS: 2 days | Discharge: HOME | DRG: 378 | End: 2023-12-27
Attending: EMERGENCY MEDICINE | Admitting: INTERNAL MEDICINE
Payer: COMMERCIAL

## 2023-12-25 ENCOUNTER — APPOINTMENT (EMERGENCY)
Dept: RADIOLOGY | Facility: HOSPITAL | Age: 78
DRG: 378 | End: 2023-12-25
Payer: COMMERCIAL

## 2023-12-25 DIAGNOSIS — K92.2 GI BLEED: ICD-10-CM

## 2023-12-25 DIAGNOSIS — D50.0 IRON DEFICIENCY ANEMIA DUE TO CHRONIC BLOOD LOSS: ICD-10-CM

## 2023-12-25 DIAGNOSIS — R03.0 ELEVATED BLOOD PRESSURE READING: ICD-10-CM

## 2023-12-25 DIAGNOSIS — K92.1 MELENA: ICD-10-CM

## 2023-12-25 DIAGNOSIS — R06.00 DYSPNEA: ICD-10-CM

## 2023-12-25 DIAGNOSIS — D64.9 SYMPTOMATIC ANEMIA: ICD-10-CM

## 2023-12-25 DIAGNOSIS — R07.9 ACUTE CHEST PAIN: ICD-10-CM

## 2023-12-25 DIAGNOSIS — N17.9 AKI (ACUTE KIDNEY INJURY) (HCC): Primary | ICD-10-CM

## 2023-12-25 LAB
2HR DELTA HS TROPONIN: 0 NG/L
ABO GROUP BLD: NORMAL
ALBUMIN SERPL BCP-MCNC: 3.9 G/DL (ref 3.5–5)
ALP SERPL-CCNC: 37 U/L (ref 34–104)
ALT SERPL W P-5'-P-CCNC: 12 U/L (ref 7–52)
ANION GAP SERPL CALCULATED.3IONS-SCNC: 8 MMOL/L
AST SERPL W P-5'-P-CCNC: 15 U/L (ref 13–39)
BASOPHILS # BLD AUTO: 0.02 THOUSANDS/ÂΜL (ref 0–0.1)
BASOPHILS NFR BLD AUTO: 0 % (ref 0–1)
BILIRUB SERPL-MCNC: 0.3 MG/DL (ref 0.2–1)
BLD GP AB SCN SERPL QL: NEGATIVE
BNP SERPL-MCNC: 452 PG/ML (ref 0–100)
BUN SERPL-MCNC: 47 MG/DL (ref 5–25)
CALCIUM SERPL-MCNC: 9.4 MG/DL (ref 8.4–10.2)
CARDIAC TROPONIN I PNL SERPL HS: 21 NG/L
CARDIAC TROPONIN I PNL SERPL HS: 21 NG/L
CHLORIDE SERPL-SCNC: 101 MMOL/L (ref 96–108)
CO2 SERPL-SCNC: 26 MMOL/L (ref 21–32)
CREAT SERPL-MCNC: 1.93 MG/DL (ref 0.6–1.3)
EOSINOPHIL # BLD AUTO: 0.19 THOUSAND/ÂΜL (ref 0–0.61)
EOSINOPHIL NFR BLD AUTO: 2 % (ref 0–6)
ERYTHROCYTE [DISTWIDTH] IN BLOOD BY AUTOMATED COUNT: 14 % (ref 11.6–15.1)
FLUAV RNA RESP QL NAA+PROBE: NEGATIVE
FLUBV RNA RESP QL NAA+PROBE: NEGATIVE
GFR SERPL CREATININE-BSD FRML MDRD: 31 ML/MIN/1.73SQ M
GLUCOSE SERPL-MCNC: 154 MG/DL (ref 65–140)
HCT VFR BLD AUTO: 23 % (ref 36.5–49.3)
HGB BLD-MCNC: 6.9 G/DL (ref 12–17)
IMM GRANULOCYTES # BLD AUTO: 0.04 THOUSAND/UL (ref 0–0.2)
IMM GRANULOCYTES NFR BLD AUTO: 1 % (ref 0–2)
LYMPHOCYTES # BLD AUTO: 1.93 THOUSANDS/ÂΜL (ref 0.6–4.47)
LYMPHOCYTES NFR BLD AUTO: 23 % (ref 14–44)
MCH RBC QN AUTO: 25.8 PG (ref 26.8–34.3)
MCHC RBC AUTO-ENTMCNC: 29.6 G/DL (ref 31.4–37.4)
MCV RBC AUTO: 87 FL (ref 82–98)
MONOCYTES # BLD AUTO: 0.57 THOUSAND/ÂΜL (ref 0.17–1.22)
MONOCYTES NFR BLD AUTO: 7 % (ref 4–12)
NEUTROPHILS # BLD AUTO: 5.74 THOUSANDS/ÂΜL (ref 1.85–7.62)
NEUTS SEG NFR BLD AUTO: 67 % (ref 43–75)
NRBC BLD AUTO-RTO: 0 /100 WBCS
PLATELET # BLD AUTO: 277 THOUSANDS/UL (ref 149–390)
PMV BLD AUTO: 10.8 FL (ref 8.9–12.7)
POTASSIUM SERPL-SCNC: 4.8 MMOL/L (ref 3.5–5.3)
PROT SERPL-MCNC: 7.2 G/DL (ref 6.4–8.4)
RBC # BLD AUTO: 2.64 MILLION/UL (ref 3.88–5.62)
RH BLD: POSITIVE
RSV RNA RESP QL NAA+PROBE: NEGATIVE
SARS-COV-2 RNA RESP QL NAA+PROBE: NEGATIVE
SODIUM SERPL-SCNC: 135 MMOL/L (ref 135–147)
SPECIMEN EXPIRATION DATE: NORMAL
WBC # BLD AUTO: 8.49 THOUSAND/UL (ref 4.31–10.16)

## 2023-12-25 PROCEDURE — 99223 1ST HOSP IP/OBS HIGH 75: CPT | Performed by: PHYSICIAN ASSISTANT

## 2023-12-25 PROCEDURE — 71045 X-RAY EXAM CHEST 1 VIEW: CPT

## 2023-12-25 PROCEDURE — P9016 RBC LEUKOCYTES REDUCED: HCPCS

## 2023-12-25 PROCEDURE — 80053 COMPREHEN METABOLIC PANEL: CPT | Performed by: EMERGENCY MEDICINE

## 2023-12-25 PROCEDURE — 1123F ACP DISCUSS/DSCN MKR DOCD: CPT | Performed by: INTERNAL MEDICINE

## 2023-12-25 PROCEDURE — 84484 ASSAY OF TROPONIN QUANT: CPT | Performed by: EMERGENCY MEDICINE

## 2023-12-25 PROCEDURE — 83880 ASSAY OF NATRIURETIC PEPTIDE: CPT | Performed by: EMERGENCY MEDICINE

## 2023-12-25 PROCEDURE — 30233N1 TRANSFUSION OF NONAUTOLOGOUS RED BLOOD CELLS INTO PERIPHERAL VEIN, PERCUTANEOUS APPROACH: ICD-10-PCS | Performed by: STUDENT IN AN ORGANIZED HEALTH CARE EDUCATION/TRAINING PROGRAM

## 2023-12-25 PROCEDURE — 86920 COMPATIBILITY TEST SPIN: CPT

## 2023-12-25 PROCEDURE — 86900 BLOOD TYPING SEROLOGIC ABO: CPT | Performed by: EMERGENCY MEDICINE

## 2023-12-25 PROCEDURE — 99285 EMERGENCY DEPT VISIT HI MDM: CPT

## 2023-12-25 PROCEDURE — 86901 BLOOD TYPING SEROLOGIC RH(D): CPT | Performed by: EMERGENCY MEDICINE

## 2023-12-25 PROCEDURE — 93005 ELECTROCARDIOGRAM TRACING: CPT

## 2023-12-25 PROCEDURE — 86850 RBC ANTIBODY SCREEN: CPT | Performed by: EMERGENCY MEDICINE

## 2023-12-25 PROCEDURE — 36415 COLL VENOUS BLD VENIPUNCTURE: CPT | Performed by: EMERGENCY MEDICINE

## 2023-12-25 PROCEDURE — 85025 COMPLETE CBC W/AUTO DIFF WBC: CPT | Performed by: EMERGENCY MEDICINE

## 2023-12-25 PROCEDURE — 99285 EMERGENCY DEPT VISIT HI MDM: CPT | Performed by: EMERGENCY MEDICINE

## 2023-12-25 PROCEDURE — 0241U HB NFCT DS VIR RESP RNA 4 TRGT: CPT | Performed by: EMERGENCY MEDICINE

## 2023-12-25 RX ADMIN — SODIUM CHLORIDE 250 ML: 0.9 INJECTION, SOLUTION INTRAVENOUS at 22:38

## 2023-12-25 NOTE — ED PROVIDER NOTES
History  Chief Complaint   Patient presents with    Shortness of Breath     Patient is an 78-year-old male, with a history significant for CHF, CAD, former tobacco use, PAD, who presents to the ED today with a 1 week history of waxing and waning dyspnea that, starting 1 day ago, has been constant and progressively worsening.  There is associated exertional chest pain that radiates to the jaw and left arm.  There is no associated fever, sick contacts, abdominal pain, urinary symptoms, weakness, numbness, orthopnea.  Patient states that he has lost about 10 pounds since being discharged from rehab.  Patient's daughter, present in room and helping to provide history, states that patient is at his baseline mental status.  Patient states he is DNR/DNI.  No clear admitting factors at rest.  Patient and family are without other concerns at this time.        Prior to Admission Medications   Prescriptions Last Dose Informant Patient Reported? Taking?   Blood Glucose Monitoring Suppl (OneTouch Verio Reflect) w/Device KIT  Self No No   Sig: Check blood sugars twice daily. Please substitute with appropriate alternative as covered by patient's insurance. Dx: E11.65   Droplet Pen Needles 32G X 4 MM MISC  Self No No   Sig: USE EVERY EVENING   Elastic Bandages & Supports (Medical Compression Stockings) MISC   No No   Sig: Use daily Knee High 15-20mmHg   Empagliflozin 25 MG TABS   No No   Sig: Take 1 tablet (25 mg total) by mouth daily   Multiple Vitamins-Minerals (MULTIVITAMIN MEN 50+ PO)  Self Yes No   Sig: Take by mouth daily   Omega-3 Fatty Acids (fish oil) 1,000 mg  Self Yes No   Sig: Take 4,000 mg by mouth 2 (two) times a day   OneTouch Delica Lancets 33G MISC  Self No No   Sig: Check blood sugars twice daily. Please substitute with appropriate alternative as covered by patient's insurance. Dx: E11.65   acetaminophen (TYLENOL) 325 mg tablet  Self No No   Sig: Take 2 tablets (650 mg total) by mouth every 6 (six) hours as needed  for mild pain   amLODIPine (NORVASC) 10 mg tablet  Self No No   Sig: Take 0.5 tablets (5 mg total) by mouth daily   aspirin 81 mg chewable tablet  Self Yes No   Sig: Chew 81 mg daily   atorvastatin (LIPITOR) 40 mg tablet  Self No No   Sig: Take 1 tablet (40 mg total) by mouth daily   Patient taking differently: Take 40 mg by mouth every evening   cloNIDine (CATAPRES) 0.1 mg tablet  Self No No   Sig: take 1 tablet by mouth every 12 hours   clopidogrel (PLAVIX) 75 mg tablet  Self No No   Sig: TAKE 1 TABLET BY MOUTH DAILY   fenofibrate 160 MG tablet  Self No No   Sig: Take 1 tablet (160 mg total) by mouth daily   gabapentin (NEURONTIN) 600 MG tablet  Self No No   Sig: TAKE 1 TABLET BY MOUTH  TWICE DAILY   glucose blood (OneTouch Verio) test strip   No No   Sig: TEST TWICE A DAY   insulin glargine (LANTUS) 100 units/mL subcutaneous injection  Self No No   Sig: Inject 15 Units under the skin daily at bedtime   isosorbide mononitrate (IMDUR) 60 mg 24 hr tablet  Self No No   Sig: Take 1 tablet (60 mg total) by mouth daily   metoprolol tartrate (LOPRESSOR) 50 mg tablet  Self No No   Sig: Take 1 tablet (50 mg total) by mouth every 12 (twelve) hours   nitroglycerin (NITROSTAT) 0.4 mg SL tablet  Self No No   Sig: Place 1 tablet (0.4 mg total) under the tongue every 5 (five) minutes as needed for chest pain   Patient not taking: Reported on 2023   omeprazole (PriLOSEC) 20 mg delayed release capsule   Yes No   Si capsule 30 minutes before morning meal Orally Once a day for 30 days   ondansetron (ZOFRAN) 4 mg tablet  Self Yes No   Sig: Take 4 mg by mouth every 8 (eight) hours as needed for nausea or vomiting   ranolazine (RANEXA) 500 mg 12 hr tablet  Self No No   Sig: take 1 tablet by mouth twice a day   silver sulfadiazine (SILVADENE,SSD) 1 % cream   No No   Sig: Apply topically 2 (two) times a day   sitaGLIPtin (JANUVIA) 100 mg tablet   No No   Sig: Take 1 tablet (100 mg total) by mouth daily   torsemide (DEMADEX) 20  mg tablet  Self No No   Sig: Take 1 tablet (20 mg total) by mouth daily      Facility-Administered Medications: None       Past Medical History:   Diagnosis Date    CAD (coronary artery disease)     Cancer (HCC)     prostate    CHF (congestive heart failure) (HCC)     Chronic kidney disease     Diabetes mellitus (HCC)     Hyperlipidemia     Hypertension     Myocardial infarction (HCC)     Neuropathy     Bilateral feet    Sleep apnea     Could not tolerate CPAP       Past Surgical History:   Procedure Laterality Date    ADENOIDECTOMY      CARDIAC CATHETERIZATION Left 10/19/2022    Procedure: Cardiac Left Heart Cath;  Surgeon: Danielle Pereira MD;  Location: [facility];  Service: Cardiology    CARDIAC SURGERY  2002    3 cardiac bypass then angioplasty 7/2020    CHOLECYSTECTOMY      COLONOSCOPY      CORONARY ARTERY BYPASS GRAFT      IR LOWER EXTREMITY ANGIOGRAM  11/1/2023    ND BYPASS W/VEIN FEMORAL-POPLITEAL Right 11/16/2023    Procedure: BYPASS FEMORAL-POPLITEAL WITH CRYO VEIN, RIGHT FEMORAL ENDARTERECTOMY;  Surgeon: Vasquez Clark MD;  Location: [facility];  Service: Vascular    ND SLCTV CATHJ 3RD+ ORD SLCTV ABDL PEL/LXTR BRNCH Right 11/1/2023    Procedure: ARTERIOGRAM Right lower extremity arteriogram with CO2 via right groin access;  Surgeon: Vasquez Clark MD;  Location: [facility];  Service: Vascular    PROSTATE SURGERY      TONSILLECTOMY         Family History   Problem Relation Age of Onset    Diabetes Mother     Alcohol abuse Father     Mental illness Neg Hx      I have reviewed and agree with the history as documented.    E-Cigarette/Vaping    E-Cigarette Use Never User      E-Cigarette/Vaping Substances    Nicotine No     THC No     CBD No     Flavoring No     Other No     Unknown No      Social History     Tobacco Use    Smoking status: Former     Current packs/day: 0.00     Average packs/day: 1.5 packs/day for 33.0 years (49.5 ttl pk-yrs)     Types: Cigarettes     Start  date:      Quit date:      Years since quittin.0    Smokeless tobacco: Never   Vaping Use    Vaping status: Never Used   Substance Use Topics    Alcohol use: Yes     Alcohol/week: 14.0 standard drinks of alcohol     Types: 14 Cans of beer per week     Comment: 1 -2 daily    Drug use: Never       Review of Systems   Constitutional:  Negative for fever.   HENT:  Negative for trouble swallowing.    Eyes:  Negative for visual disturbance.   Respiratory:  Positive for shortness of breath.    Cardiovascular:  Positive for chest pain.   Gastrointestinal:  Negative for abdominal pain.   Endocrine: Negative for polyuria.   Genitourinary:  Negative for dysuria.   Musculoskeletal:  Negative for gait problem.   Skin:  Negative for rash.   Allergic/Immunologic: Positive for environmental allergies.   Neurological:  Negative for weakness and numbness.   Psychiatric/Behavioral:  Negative for confusion.    All other systems reviewed and are negative.      Physical Exam  Physical Exam  Vitals and nursing note reviewed.   Constitutional:       General: He is not in acute distress.     Appearance: He is well-developed. He is not toxic-appearing.   HENT:      Head: Normocephalic and atraumatic.      Right Ear: External ear normal.      Left Ear: External ear normal.      Nose: Nose normal. No rhinorrhea.      Mouth/Throat:      Mouth: Mucous membranes are moist.      Pharynx: Oropharynx is clear. No oropharyngeal exudate or posterior oropharyngeal erythema.      Comments: Uvula midline. No oropharyngeal or submandibular mass/swelling  Eyes:      General: No scleral icterus.        Right eye: No discharge.         Left eye: No discharge.      Conjunctiva/sclera: Conjunctivae normal.      Pupils: Pupils are equal, round, and reactive to light.   Neck:      Comments: Patient is spontaneously rotating their neck to the left and right during the history and physical exam interaction without difficulty or apparent discomfort     Cardiovascular:      Rate and Rhythm: Regular rhythm. Bradycardia present.      Pulses: Normal pulses.      Heart sounds: Murmur (Known to patient) heard.      No friction rub. No gallop.      Comments: 2+ Radial  Pulmonary:      Effort: Pulmonary effort is normal. No respiratory distress.      Breath sounds: Normal breath sounds. No stridor. No wheezing, rhonchi or rales.   Abdominal:      General: Abdomen is flat. There is no distension.      Palpations: Abdomen is soft.      Tenderness: There is no abdominal tenderness. There is no right CVA tenderness, left CVA tenderness, guarding or rebound.   Musculoskeletal:         General: No deformity.      Cervical back: Neck supple. No tenderness. No muscular tenderness.   Lymphadenopathy:      Cervical: No cervical adenopathy.   Skin:     General: Skin is warm and dry.      Capillary Refill: Capillary refill takes less than 2 seconds.   Neurological:      Mental Status: He is alert.      Comments: Patient is speaking clearly in complete sentences.  Patient is answering appropriately and able follow commands.  Patient is moving all four extremities spontaneously.  No facial droop.  Tongue midline.      Psychiatric:         Mood and Affect: Mood normal.         Behavior: Behavior normal.         Vital Signs  ED Triage Vitals   Temperature Pulse Respirations Blood Pressure SpO2   12/25/23 1759 12/25/23 1759 12/25/23 1759 12/25/23 1759 12/25/23 1800   97.6 °F (36.4 °C) (!) 48 20 148/66 100 %      Temp Source Heart Rate Source Patient Position - Orthostatic VS BP Location FiO2 (%)   12/25/23 1759 12/25/23 2100 12/25/23 1759 12/25/23 1759 --   Oral Monitor Sitting Right arm       Pain Score       12/25/23 1759       No Pain           Vitals:    12/25/23 1759 12/25/23 2100   BP: 148/66 129/59   Pulse: (!) 48 (!) 51   Patient Position - Orthostatic VS: Sitting Lying         Visual Acuity      ED Medications  Medications   sodium chloride 0.9 % bolus 250 mL (has no  administration in time range)       Diagnostic Studies  Results Reviewed       Procedure Component Value Units Date/Time    HS Troponin I 4hr [[id number]]     Lab Status: No result Specimen: Blood     COVID/FLU/RSV [[id number]]  (Normal) Collected: 12/25/23 1844    Lab Status: Final result Specimen: Nares from Nose Updated: 12/25/23 1929     SARS-CoV-2 Negative     INFLUENZA A PCR Negative     INFLUENZA B PCR Negative     RSV PCR Negative    Narrative:      FOR PEDIATRIC PATIENTS - copy/paste COVID Guidelines URL to browser: [email or web address]    SARS-CoV-2 assay is a Nucleic Acid Amplification assay intended for the  qualitative detection of nucleic acid from SARS-CoV-2 in nasopharyngeal  swabs. Results are for the presumptive identification of SARS-CoV-2 RNA.    Positive results are indicative of infection with SARS-CoV-2, the virus  causing COVID-19, but do not rule out bacterial infection or co-infection  with other viruses. Laboratories within the [place] and its  territories are required to report all positive results to the appropriate  public health authorities. Negative results do not preclude SARS-CoV-2  infection and should not be used as the sole basis for treatment or other  patient management decisions. Negative results must be combined with  clinical observations, patient history, and epidemiological information.  This test has not been FDA cleared or approved.    This test has been authorized by FDA under an Emergency Use Authorization  (EUA). This test is only authorized for the duration of time the  declaration that circumstances exist justifying the authorization of the  emergency use of an in vitro diagnostic tests for detection of SARS-CoV-2  virus and/or diagnosis of COVID-19 infection under section 564(b)(1) of  the Act, 21 U.S.C. 360bbb-3(b)(1), unless the authorization is terminated  or revoked sooner. The test has been validated  but independent review by FDA  and CLIA is pending.    Test performed using [employer]91wed GeneXpert: This RT-PCR assay targets N2,  a region unique to SARS-CoV-2. A conserved region in the E-gene was chosen  for pan-Sarbecovirus detection which includes SARS-CoV-2.    According to CMS-2020-01-R, this platform meets the definition of high-throughput technology.    HS Troponin I 2hr [[id number]]     Lab Status: No result Specimen: Blood     HS Troponin 0hr (reflex protocol) [[id number]]  (Normal) Collected: 12/25/23 1844    Lab Status: Final result Specimen: Blood from Arm, Right Updated: 12/25/23 1919     hs TnI 0hr 21 ng/L     B-Type Natriuretic Peptide(BNP) [[id number]]  (Abnormal) Collected: 12/25/23 1844    Lab Status: Final result Specimen: Blood from Arm, Right Updated: 12/25/23 1918      pg/mL     Comprehensive metabolic panel [[id number]]  (Abnormal) Collected: 12/25/23 1844    Lab Status: Final result Specimen: Blood from Arm, Right Updated: 12/25/23 1912     Sodium 135 mmol/L      Potassium 4.8 mmol/L      Chloride 101 mmol/L      CO2 26 mmol/L      ANION GAP 8 mmol/L      BUN 47 mg/dL      Creatinine 1.93 mg/dL      Glucose 154 mg/dL      Calcium 9.4 mg/dL      AST 15 U/L      ALT 12 U/L      Alkaline Phosphatase 37 U/L      Total Protein 7.2 g/dL      Albumin 3.9 g/dL      Total Bilirubin 0.30 mg/dL      eGFR 31 ml/min/1.73sq m     Narrative:      National Kidney Disease Foundation guidelines for Chronic Kidney Disease (CKD):     Stage 1 with normal or high GFR (GFR > 90 mL/min/1.73 square meters)    Stage 2 Mild CKD (GFR = 60-89 mL/min/1.73 square meters)    Stage 3A Moderate CKD (GFR = 45-59 mL/min/1.73 square meters)    Stage 3B Moderate CKD (GFR = 30-44 mL/min/1.73 square meters)    Stage 4 Severe CKD (GFR = 15-29 mL/min/1.73 square meters)    Stage 5 End Stage CKD (GFR <15 mL/min/1.73 square meters)  Note: GFR calculation is accurate only with a steady state creatinine    CBC and differential  [[id number]]  (Abnormal) Collected: 12/25/23 1844    Lab Status: Final result Specimen: Blood from Arm, Right Updated: 12/25/23 1902     WBC 8.49 Thousand/uL      RBC 2.64 Million/uL      Hemoglobin 6.9 g/dL      Hematocrit 23.0 %      MCV 87 fL      MCH 25.8 pg      MCHC 29.6 g/dL      RDW 14.0 %      MPV 10.8 fL      Platelets 277 Thousands/uL      nRBC 0 /100 WBCs      Neutrophils Relative 67 %      Immat GRANS % 1 %      Lymphocytes Relative 23 %      Monocytes Relative 7 %      Eosinophils Relative 2 %      Basophils Relative 0 %      Neutrophils Absolute 5.74 Thousands/µL      Immature Grans Absolute 0.04 Thousand/uL      Lymphocytes Absolute 1.93 Thousands/µL      Monocytes Absolute 0.57 Thousand/µL      Eosinophils Absolute 0.19 Thousand/µL      Basophils Absolute 0.02 Thousands/µL     Narrative:      This is an appended report.  These results have been appended to a previously verified report.                   XR chest 1 view portable   ED Interpretation by Jameson Lucas MD (12/25 2220)   Per my independent interpretation: No acute cardiopulmonary process                 Procedures  Procedures         ED Course  ED Course as of 12/25/23 2221   Mon Dec 25, 2023   1833 ECG per my independent interpretation: Bradycardicrate, regular rhythm, no ectopy, leftward axis, no ST elevations or depressions     1903 Hemoglobin(!!): 6.9  Low   1910 On reevaluation, patient has improvement in symptoms at this time   1928 Results reviewed with patient and family.  Patient reports melena.  Blood consent signed.  Patient agreeable with admission.  Stool guaiac positive and grossly melanotic on ENEIDA: Chaperone present   1929 hs TnI 0hr: 21  High, lower than prior    1929 Creatinine(!): 1.93  High, IVY             HEART Risk Score      Flowsheet Row Most Recent Value   Heart Score Risk Calculator    History 2 Filed at: 12/25/2023 1930   ECG 1 Filed at: 12/25/2023 1930   Age 2 Filed at: 12/25/2023 1930   Risk Factors 2  Filed at: 12/25/2023 1930   Troponin 1 Filed at: 12/25/2023 1930   HEART Score 8 Filed at: 12/25/2023 1930                                        Medical Decision Making  Patient is an 78-year-old male, with a history significant for CHF, CAD, former tobacco use, PAD, who presents to the ED today with a 1 week history of waxing and waning dyspnea that, starting 1 day ago, has been constant and progressively worsening.  There is associated exertional chest pain that radiates to the jaw and left arm.  There is no associated fever, sick contacts, abdominal pain, urinary symptoms, weakness, numbness, orthopnea.  Patient states that he has lost about 10 pounds since being discharged from rehab.  Patient's daughter, present in room and helping to provide history, states that patient is at his baseline mental status.  Patient states he is DNR/DNI.  No clear admitting factors at rest.  Patient is currently afebrile and hemodynamically stable.  His physical exam is notable for cardiac murmur, clear lungs, soft nontender abdomen.  This presentation is concerning for: ACS.  Also considered anemia, IVY, electrolyte abnormality.  Patient at risk for pneumonia.  Will investigate with cardiac workup.  Will manage based upon workup.    Amount and/or Complexity of Data Reviewed  Labs: ordered. Decision-making details documented in ED Course.  Radiology: ordered and independent interpretation performed.    Risk  Decision regarding hospitalization.             Disposition  Final diagnoses:   Acute chest pain   Dyspnea   IVY (acute kidney injury) (HCC)   Melena   GI bleed   Elevated blood pressure reading   Symptomatic anemia     Time reflects when diagnosis was documented in both MDM as applicable and the Disposition within this note       Time User Action Codes Description Comment    12/25/2023  7:30 PM Jameson Lucas Add [R07.9] Acute chest pain     12/25/2023  7:30 PM Jameson Lucas Add [R06.00] Dyspnea     12/25/2023   7:30 PM Shayy, Raher A Add [N17.9] IVY (acute kidney injury) ([facility])     12/25/2023  7:30 PM Legjose roberto, Raher A Add [K92.1] Melena     12/25/2023  7:30 PM Legare, Christopher A Add [K92.2] GI bleed     12/25/2023  7:30 PM Legjose roberto, Christopher A Add [R03.0] Elevated blood pressure reading     12/25/2023  7:34 PM Legjose roberto, Christopher A Add [D64.9] Symptomatic anemia     12/25/2023  7:34 PM Legjose roberto, Christopher A Modify [R07.9] Acute chest pain     12/25/2023  7:34 PM Legare, Christopher A Modify [N17.9] IVY (acute kidney injury) ([facility])           ED Disposition       ED Disposition   Admit    Condition   Stable    Date/Time   Mon Dec 25, 2023 1944    Comment   Case was discussed with HARRY and the patient's admission status was agreed to be Admission Status: inpatient status to the service of Dr. Acevedo .               Follow-up Information    None         Current Discharge Medication List        CONTINUE these medications which have NOT CHANGED    Details   acetaminophen (TYLENOL) 325 mg tablet Take 2 tablets (650 mg total) by mouth every 6 (six) hours as needed for mild pain  Refills: 0    Associated Diagnoses: Peripheral artery disease ([facility])      amLODIPine (NORVASC) 10 mg tablet Take 0.5 tablets (5 mg total) by mouth daily  Qty: 1 tablet, Refills: 1    Associated Diagnoses: Benign hypertension with CKD (chronic kidney disease) stage III ([facility])      aspirin 81 mg chewable tablet Chew 81 mg daily      atorvastatin (LIPITOR) 40 mg tablet Take 1 tablet (40 mg total) by mouth daily  Qty: 90 tablet, Refills: 3    Comments: Requesting 1 year supply  Associated Diagnoses: Coronary artery disease involving native coronary artery of native heart without angina pectoris; Mixed hyperlipidemia      Blood Glucose Monitoring Suppl (OneTouch Verio Reflect) w/Device KIT Check blood sugars twice daily. Please substitute with appropriate alternative as covered by patient's insurance. Dx: E11.65  Qty: 1 kit, Refills: 0     Associated Diagnoses: Type 2 diabetes mellitus with diabetic polyneuropathy, without long-term current use of insulin ([facility])      cloNIDine (CATAPRES) 0.1 mg tablet take 1 tablet by mouth every 12 hours  Qty: 180 tablet, Refills: 1    Associated Diagnoses: Benign hypertension with CKD (chronic kidney disease) stage III ([facility])      clopidogrel (PLAVIX) 75 mg tablet TAKE 1 TABLET BY MOUTH DAILY  Qty: 90 tablet, Refills: 0    Comments: Please send a replace/new response with 90-Day Supply if appropriate to maximize member benefit. Requesting 1 year supply.  Associated Diagnoses: Coronary artery disease involving native coronary artery of native heart without angina pectoris      Droplet Pen Needles 32G X 4 MM MISC USE EVERY EVENING  Qty: 100 each, Refills: 5    Associated Diagnoses: Type 2 diabetes mellitus with diabetic polyneuropathy, without long-term current use of insulin ([facility])      Elastic Bandages & Supports (Medical Compression Stockings) MISC Use daily Knee High 15-20mmHg  Qty: 2 each, Refills: 4    Associated Diagnoses: Diabetic ulcer of right midfoot associated with diabetes mellitus due to underlying condition, limited to breakdown of skin ([facility])      Empagliflozin 25 MG TABS Take 1 tablet (25 mg total) by mouth daily  Qty: 90 tablet, Refills: 1    Associated Diagnoses: Type 2 diabetes mellitus with diabetic peripheral angiopathy without gangrene, with long-term current use of insulin ([facility])      fenofibrate 160 MG tablet Take 1 tablet (160 mg total) by mouth daily  Qty: 90 tablet, Refills: 3    Comments: Requesting 1 year supply  Associated Diagnoses: Mixed hyperlipidemia      gabapentin (NEURONTIN) 600 MG tablet TAKE 1 TABLET BY MOUTH  TWICE DAILY  Qty: 180 tablet, Refills: 3    Comments: Requesting 1 year supply  Associated Diagnoses: Radiculopathy of lumbar region; Diabetic polyneuropathy associated with type 2 diabetes mellitus ([facility]); Metatarsalgia of both feet      glucose blood (OneTouch Verio) test  strip TEST TWICE A DAY  Qty: 200 strip, Refills: 5    Associated Diagnoses: Type 2 diabetes mellitus with diabetic polyneuropathy, without long-term current use of insulin ([facility])      insulin glargine (LANTUS) 100 units/mL subcutaneous injection Inject 15 Units under the skin daily at bedtime  Qty: 10 mL, Refills: 0    Associated Diagnoses: Type 2 diabetes mellitus with diabetic peripheral angiopathy without gangrene, with long-term current use of insulin ([facility])      isosorbide mononitrate (IMDUR) 60 mg 24 hr tablet Take 1 tablet (60 mg total) by mouth daily  Qty: 90 tablet, Refills: 3    Comments: Requesting 1 year supply  Associated Diagnoses: Stable angina pectoris      metoprolol tartrate (LOPRESSOR) 50 mg tablet Take 1 tablet (50 mg total) by mouth every 12 (twelve) hours  Qty: 180 tablet, Refills: 3    Associated Diagnoses: Benign hypertension with CKD (chronic kidney disease) stage III ([facility])      Multiple Vitamins-Minerals (MULTIVITAMIN MEN 50+ PO) Take by mouth daily      nitroglycerin (NITROSTAT) 0.4 mg SL tablet Place 1 tablet (0.4 mg total) under the tongue every 5 (five) minutes as needed for chest pain  Qty: 30 tablet, Refills: 3    Associated Diagnoses: PVC (premature ventricular contraction); Hypertension, unspecified type      Omega-3 Fatty Acids (fish oil) 1,000 mg Take 4,000 mg by mouth 2 (two) times a day      omeprazole (PriLOSEC) 20 mg delayed release capsule 1 capsule 30 minutes before morning meal Orally Once a day for 30 days      ondansetron (ZOFRAN) 4 mg tablet Take 4 mg by mouth every 8 (eight) hours as needed for nausea or vomiting      OneTouch Delica Lancets 33G MISC Check blood sugars twice daily. Please substitute with appropriate alternative as covered by patient's insurance. Dx: E11.65  Qty: 200 each, Refills: 3    Associated Diagnoses: Type 2 diabetes mellitus with diabetic polyneuropathy, without long-term current use of insulin ([facility])      ranolazine (RANEXA) 500 mg 12 hr tablet  take 1 tablet by mouth twice a day  Qty: 60 tablet, Refills: 3    Associated Diagnoses: PVC (premature ventricular contraction)      silver sulfadiazine (SILVADENE,SSD) 1 % cream Apply topically 2 (two) times a day  Qty: 50 g, Refills: 1    Associated Diagnoses: Diabetic ulcer of right heel associated with type 2 diabetes mellitus, limited to breakdown of skin (HCC)      sitaGLIPtin (JANUVIA) 100 mg tablet Take 1 tablet (100 mg total) by mouth daily  Qty: 90 tablet, Refills: 1    Associated Diagnoses: Type 2 diabetes mellitus with diabetic peripheral angiopathy without gangrene, with long-term current use of insulin (HCC)      torsemide (DEMADEX) 20 mg tablet Take 1 tablet (20 mg total) by mouth daily  Qty: 90 tablet, Refills: 3    Associated Diagnoses: CHF (congestive heart failure) (HCC)             No discharge procedures on file.    PDMP Review         Value Time User    PDMP Reviewed  Yes 2/17/2022  4:22 PM Maggie Garcia DO            ED Provider  Electronically Signed by             Jameson Lucas MD  12/25/23 2402

## 2023-12-26 ENCOUNTER — ANESTHESIA (INPATIENT)
Dept: GASTROENTEROLOGY | Facility: HOSPITAL | Age: 78
DRG: 378 | End: 2023-12-26
Payer: COMMERCIAL

## 2023-12-26 ENCOUNTER — ANESTHESIA EVENT (OUTPATIENT)
Dept: ANESTHESIOLOGY | Facility: HOSPITAL | Age: 78
End: 2023-12-26

## 2023-12-26 ENCOUNTER — ANESTHESIA (OUTPATIENT)
Dept: ANESTHESIOLOGY | Facility: HOSPITAL | Age: 78
End: 2023-12-26

## 2023-12-26 ENCOUNTER — APPOINTMENT (INPATIENT)
Dept: GASTROENTEROLOGY | Facility: HOSPITAL | Age: 78
DRG: 378 | End: 2023-12-26
Payer: COMMERCIAL

## 2023-12-26 ENCOUNTER — ANESTHESIA EVENT (INPATIENT)
Dept: GASTROENTEROLOGY | Facility: HOSPITAL | Age: 78
DRG: 378 | End: 2023-12-26
Payer: COMMERCIAL

## 2023-12-26 PROBLEM — K92.1 MELENA: Status: ACTIVE | Noted: 2023-12-26

## 2023-12-26 PROBLEM — D64.9 SYMPTOMATIC ANEMIA: Status: ACTIVE | Noted: 2023-12-26

## 2023-12-26 PROBLEM — N17.9 ACUTE KIDNEY INJURY SUPERIMPOSED ON CHRONIC KIDNEY DISEASE: Status: ACTIVE | Noted: 2022-02-16

## 2023-12-26 LAB
ABO GROUP BLD BPU: NORMAL
ANION GAP SERPL CALCULATED.3IONS-SCNC: 6 MMOL/L
ATRIAL RATE: 47 BPM
BPU ID: NORMAL
BUN SERPL-MCNC: 43 MG/DL (ref 5–25)
CALCIUM SERPL-MCNC: 8.7 MG/DL (ref 8.4–10.2)
CHLORIDE SERPL-SCNC: 103 MMOL/L (ref 96–108)
CO2 SERPL-SCNC: 27 MMOL/L (ref 21–32)
CREAT SERPL-MCNC: 1.79 MG/DL (ref 0.6–1.3)
CROSSMATCH: NORMAL
FERRITIN SERPL-MCNC: 12 NG/ML (ref 24–336)
FOLATE SERPL-MCNC: >22.3 NG/ML
GFR SERPL CREATININE-BSD FRML MDRD: 35 ML/MIN/1.73SQ M
GLUCOSE SERPL-MCNC: 113 MG/DL (ref 65–140)
GLUCOSE SERPL-MCNC: 113 MG/DL (ref 65–140)
GLUCOSE SERPL-MCNC: 114 MG/DL (ref 65–140)
GLUCOSE SERPL-MCNC: 128 MG/DL (ref 65–140)
GLUCOSE SERPL-MCNC: 211 MG/DL (ref 65–140)
HGB BLD-MCNC: 7.4 G/DL (ref 12–17)
HGB BLD-MCNC: 7.5 G/DL (ref 12–17)
IRON SATN MFR SERPL: 5 % (ref 15–50)
IRON SERPL-MCNC: 19 UG/DL (ref 50–212)
P AXIS: 57 DEGREES
POTASSIUM SERPL-SCNC: 3.9 MMOL/L (ref 3.5–5.3)
PR INTERVAL: 272 MS
QRS AXIS: -49 DEGREES
QRSD INTERVAL: 124 MS
QT INTERVAL: 498 MS
QTC INTERVAL: 440 MS
RETICS # AUTO: ABNORMAL 10*3/UL (ref 14356–105094)
RETICS # CALC: 3.72 % (ref 0.37–1.87)
SODIUM SERPL-SCNC: 136 MMOL/L (ref 135–147)
T WAVE AXIS: 259 DEGREES
TIBC SERPL-MCNC: 379 UG/DL (ref 250–450)
UIBC SERPL-MCNC: 360 UG/DL (ref 155–355)
UNIT DISPENSE STATUS: NORMAL
UNIT PRODUCT CODE: NORMAL
UNIT PRODUCT VOLUME: 300 ML
UNIT RH: NORMAL
VENTRICULAR RATE: 47 BPM
VIT B12 SERPL-MCNC: 389 PG/ML (ref 180–914)

## 2023-12-26 PROCEDURE — C9113 INJ PANTOPRAZOLE SODIUM, VIA: HCPCS | Performed by: PHYSICIAN ASSISTANT

## 2023-12-26 PROCEDURE — 80048 BASIC METABOLIC PNL TOTAL CA: CPT | Performed by: PHYSICIAN ASSISTANT

## 2023-12-26 PROCEDURE — 88305 TISSUE EXAM BY PATHOLOGIST: CPT | Performed by: PATHOLOGY

## 2023-12-26 PROCEDURE — 85045 AUTOMATED RETICULOCYTE COUNT: CPT | Performed by: STUDENT IN AN ORGANIZED HEALTH CARE EDUCATION/TRAINING PROGRAM

## 2023-12-26 PROCEDURE — 82948 REAGENT STRIP/BLOOD GLUCOSE: CPT

## 2023-12-26 PROCEDURE — 82607 VITAMIN B-12: CPT | Performed by: STUDENT IN AN ORGANIZED HEALTH CARE EDUCATION/TRAINING PROGRAM

## 2023-12-26 PROCEDURE — 82728 ASSAY OF FERRITIN: CPT | Performed by: PHYSICIAN ASSISTANT

## 2023-12-26 PROCEDURE — C9113 INJ PANTOPRAZOLE SODIUM, VIA: HCPCS | Performed by: INTERNAL MEDICINE

## 2023-12-26 PROCEDURE — 82746 ASSAY OF FOLIC ACID SERUM: CPT | Performed by: STUDENT IN AN ORGANIZED HEALTH CARE EDUCATION/TRAINING PROGRAM

## 2023-12-26 PROCEDURE — 0DJ08ZZ INSPECTION OF UPPER INTESTINAL TRACT, VIA NATURAL OR ARTIFICIAL OPENING ENDOSCOPIC: ICD-10-PCS | Performed by: INTERNAL MEDICINE

## 2023-12-26 PROCEDURE — 99222 1ST HOSP IP/OBS MODERATE 55: CPT | Performed by: INTERNAL MEDICINE

## 2023-12-26 PROCEDURE — 85018 HEMOGLOBIN: CPT | Performed by: INTERNAL MEDICINE

## 2023-12-26 PROCEDURE — 99233 SBSQ HOSP IP/OBS HIGH 50: CPT | Performed by: STUDENT IN AN ORGANIZED HEALTH CARE EDUCATION/TRAINING PROGRAM

## 2023-12-26 PROCEDURE — 83550 IRON BINDING TEST: CPT | Performed by: PHYSICIAN ASSISTANT

## 2023-12-26 PROCEDURE — 36415 COLL VENOUS BLD VENIPUNCTURE: CPT | Performed by: PHYSICIAN ASSISTANT

## 2023-12-26 PROCEDURE — 83540 ASSAY OF IRON: CPT | Performed by: PHYSICIAN ASSISTANT

## 2023-12-26 PROCEDURE — 43239 EGD BIOPSY SINGLE/MULTIPLE: CPT | Performed by: INTERNAL MEDICINE

## 2023-12-26 PROCEDURE — 85018 HEMOGLOBIN: CPT | Performed by: PHYSICIAN ASSISTANT

## 2023-12-26 RX ORDER — INSULIN GLARGINE 100 [IU]/ML
15 INJECTION, SOLUTION SUBCUTANEOUS
Status: DISCONTINUED | OUTPATIENT
Start: 2023-12-26 | End: 2023-12-26

## 2023-12-26 RX ORDER — GABAPENTIN 300 MG/1
600 CAPSULE ORAL 2 TIMES DAILY
Status: DISCONTINUED | OUTPATIENT
Start: 2023-12-26 | End: 2023-12-27 | Stop reason: HOSPADM

## 2023-12-26 RX ORDER — INSULIN LISPRO 100 [IU]/ML
1-5 INJECTION, SOLUTION INTRAVENOUS; SUBCUTANEOUS
Status: DISCONTINUED | OUTPATIENT
Start: 2023-12-26 | End: 2023-12-27 | Stop reason: HOSPADM

## 2023-12-26 RX ORDER — ASPIRIN 81 MG/1
81 TABLET, CHEWABLE ORAL DAILY
Status: DISCONTINUED | OUTPATIENT
Start: 2023-12-26 | End: 2023-12-27 | Stop reason: HOSPADM

## 2023-12-26 RX ORDER — AMLODIPINE BESYLATE 5 MG/1
5 TABLET ORAL DAILY
Status: DISCONTINUED | OUTPATIENT
Start: 2023-12-26 | End: 2023-12-27 | Stop reason: HOSPADM

## 2023-12-26 RX ORDER — FENTANYL CITRATE 50 UG/ML
INJECTION, SOLUTION INTRAMUSCULAR; INTRAVENOUS AS NEEDED
Status: DISCONTINUED | OUTPATIENT
Start: 2023-12-26 | End: 2023-12-26

## 2023-12-26 RX ORDER — RANOLAZINE 500 MG/1
500 TABLET, EXTENDED RELEASE ORAL 2 TIMES DAILY
Status: DISCONTINUED | OUTPATIENT
Start: 2023-12-26 | End: 2023-12-27 | Stop reason: HOSPADM

## 2023-12-26 RX ORDER — PANTOPRAZOLE SODIUM 40 MG/10ML
40 INJECTION, POWDER, LYOPHILIZED, FOR SOLUTION INTRAVENOUS
Status: DISCONTINUED | OUTPATIENT
Start: 2023-12-26 | End: 2023-12-26

## 2023-12-26 RX ORDER — INSULIN LISPRO 100 [IU]/ML
1-6 INJECTION, SOLUTION INTRAVENOUS; SUBCUTANEOUS EVERY 6 HOURS SCHEDULED
Status: DISCONTINUED | OUTPATIENT
Start: 2023-12-26 | End: 2023-12-26

## 2023-12-26 RX ORDER — PROPOFOL 10 MG/ML
INJECTION, EMULSION INTRAVENOUS AS NEEDED
Status: DISCONTINUED | OUTPATIENT
Start: 2023-12-26 | End: 2023-12-26

## 2023-12-26 RX ORDER — FENOFIBRATE 145 MG/1
145 TABLET, COATED ORAL DAILY
Status: DISCONTINUED | OUTPATIENT
Start: 2023-12-26 | End: 2023-12-27 | Stop reason: HOSPADM

## 2023-12-26 RX ORDER — ONDANSETRON 2 MG/ML
INJECTION INTRAMUSCULAR; INTRAVENOUS
Status: COMPLETED
Start: 2023-12-26 | End: 2023-12-26

## 2023-12-26 RX ORDER — SODIUM CHLORIDE 9 MG/ML
INJECTION, SOLUTION INTRAVENOUS CONTINUOUS PRN
Status: DISCONTINUED | OUTPATIENT
Start: 2023-12-26 | End: 2023-12-26

## 2023-12-26 RX ORDER — ONDANSETRON 2 MG/ML
4 INJECTION INTRAMUSCULAR; INTRAVENOUS EVERY 6 HOURS PRN
Status: DISCONTINUED | OUTPATIENT
Start: 2023-12-26 | End: 2023-12-27 | Stop reason: HOSPADM

## 2023-12-26 RX ORDER — SODIUM CHLORIDE 9 MG/ML
75 INJECTION, SOLUTION INTRAVENOUS CONTINUOUS
Status: DISCONTINUED | OUTPATIENT
Start: 2023-12-26 | End: 2023-12-26

## 2023-12-26 RX ORDER — PANTOPRAZOLE SODIUM 40 MG/10ML
40 INJECTION, POWDER, LYOPHILIZED, FOR SOLUTION INTRAVENOUS EVERY 12 HOURS SCHEDULED
Status: DISCONTINUED | OUTPATIENT
Start: 2023-12-26 | End: 2023-12-27 | Stop reason: HOSPADM

## 2023-12-26 RX ORDER — ISOSORBIDE MONONITRATE 60 MG/1
60 TABLET, EXTENDED RELEASE ORAL DAILY
Status: DISCONTINUED | OUTPATIENT
Start: 2023-12-26 | End: 2023-12-27 | Stop reason: HOSPADM

## 2023-12-26 RX ORDER — SODIUM CHLORIDE, SODIUM LACTATE, POTASSIUM CHLORIDE, CALCIUM CHLORIDE 600; 310; 30; 20 MG/100ML; MG/100ML; MG/100ML; MG/100ML
INJECTION, SOLUTION INTRAVENOUS CONTINUOUS PRN
Status: DISCONTINUED | OUTPATIENT
Start: 2023-12-26 | End: 2023-12-26

## 2023-12-26 RX ORDER — INSULIN LISPRO 100 [IU]/ML
1-6 INJECTION, SOLUTION INTRAVENOUS; SUBCUTANEOUS
Status: DISCONTINUED | OUTPATIENT
Start: 2023-12-27 | End: 2023-12-27 | Stop reason: HOSPADM

## 2023-12-26 RX ORDER — INSULIN GLARGINE 100 [IU]/ML
8 INJECTION, SOLUTION SUBCUTANEOUS
Status: DISCONTINUED | OUTPATIENT
Start: 2023-12-26 | End: 2023-12-27 | Stop reason: HOSPADM

## 2023-12-26 RX ORDER — ATORVASTATIN CALCIUM 40 MG/1
40 TABLET, FILM COATED ORAL EVERY EVENING
Status: DISCONTINUED | OUTPATIENT
Start: 2023-12-26 | End: 2023-12-27 | Stop reason: HOSPADM

## 2023-12-26 RX ADMIN — FENTANYL CITRATE 50 MCG: 50 INJECTION INTRAMUSCULAR; INTRAVENOUS at 15:30

## 2023-12-26 RX ADMIN — PANTOPRAZOLE SODIUM 40 MG: 40 INJECTION, POWDER, FOR SOLUTION INTRAVENOUS at 22:10

## 2023-12-26 RX ADMIN — INSULIN GLARGINE 8 UNITS: 100 INJECTION, SOLUTION SUBCUTANEOUS at 22:11

## 2023-12-26 RX ADMIN — FENTANYL CITRATE 50 MCG: 50 INJECTION INTRAMUSCULAR; INTRAVENOUS at 15:28

## 2023-12-26 RX ADMIN — SODIUM CHLORIDE 75 ML/HR: 0.9 INJECTION, SOLUTION INTRAVENOUS at 08:29

## 2023-12-26 RX ADMIN — GABAPENTIN 600 MG: 300 CAPSULE ORAL at 17:28

## 2023-12-26 RX ADMIN — AMLODIPINE BESYLATE 5 MG: 5 TABLET ORAL at 08:23

## 2023-12-26 RX ADMIN — ONDANSETRON 4 MG: 2 INJECTION INTRAMUSCULAR; INTRAVENOUS at 07:59

## 2023-12-26 RX ADMIN — INSULIN LISPRO 1 UNITS: 100 INJECTION, SOLUTION INTRAVENOUS; SUBCUTANEOUS at 22:11

## 2023-12-26 RX ADMIN — RANOLAZINE 500 MG: 500 TABLET, FILM COATED, EXTENDED RELEASE ORAL at 22:10

## 2023-12-26 RX ADMIN — ASPIRIN 81 MG CHEWABLE TABLET 81 MG: 81 TABLET CHEWABLE at 19:12

## 2023-12-26 RX ADMIN — PANTOPRAZOLE SODIUM 40 MG: 40 INJECTION, POWDER, FOR SOLUTION INTRAVENOUS at 08:25

## 2023-12-26 RX ADMIN — FENOFIBRATE 145 MG: 145 TABLET ORAL at 08:24

## 2023-12-26 RX ADMIN — ISOSORBIDE MONONITRATE 60 MG: 60 TABLET, EXTENDED RELEASE ORAL at 08:24

## 2023-12-26 RX ADMIN — RANOLAZINE 500 MG: 500 TABLET, FILM COATED, EXTENDED RELEASE ORAL at 08:24

## 2023-12-26 RX ADMIN — PROPOFOL 150 MG: 10 INJECTION, EMULSION INTRAVENOUS at 15:28

## 2023-12-26 RX ADMIN — IRON SUCROSE 200 MG: 20 INJECTION, SOLUTION INTRAVENOUS at 19:12

## 2023-12-26 RX ADMIN — SODIUM CHLORIDE: 0.9 INJECTION, SOLUTION INTRAVENOUS at 15:28

## 2023-12-26 RX ADMIN — ATORVASTATIN CALCIUM 40 MG: 40 TABLET, FILM COATED ORAL at 17:28

## 2023-12-26 RX ADMIN — GABAPENTIN 600 MG: 300 CAPSULE ORAL at 08:23

## 2023-12-26 NOTE — ASSESSMENT & PLAN NOTE
Lab Results   Component Value Date    EGFR 35 12/26/2023    EGFR 31 12/25/2023    EGFR 53 11/21/2023    CREATININE 1.79 (H) 12/26/2023    CREATININE 1.93 (H) 12/25/2023    CREATININE 1.26 11/21/2023     Suspected pre-renal / vascular depletion ; relatively improved post IVF bolus in the ED ; continue IV hydration while NPO, avoid nephrotoxic agents and trend

## 2023-12-26 NOTE — ASSESSMENT & PLAN NOTE
S/p right common femoral to peroneal artery bypass on 11/16/23  Hold aspirin. Continue statin  Outpatient follow-up with vascular surgery.

## 2023-12-26 NOTE — ASSESSMENT & PLAN NOTE
In the setting of melena/ suspected upper GI bleed.   Presented with SOB, fatigue, generalized weakness and melena.   Hg 6.9 on presentation with baseline Hgb 12-14 prior to his vascular procedure last month.   Received 1 unit of PRBC thus far.   GI consult.   Keep NPO.   Trend H&H

## 2023-12-26 NOTE — ANESTHESIA PREPROCEDURE EVALUATION
Procedure:  PRE-OP ONLY    Relevant Problems   CARDIO   (+) Coronary artery disease involving native coronary artery of native heart without angina pectoris   (+) Hypertensive urgency   (+) Mild aortic stenosis   (+) Mixed hyperlipidemia   (+) Peripheral artery disease (HCC)   (+) Primary hypertension   (+) Type 2 diabetes mellitus with diabetic peripheral angiopathy without gangrene, with long-term current use of insulin (HCC)      ENDO   (+) Type 2 diabetes mellitus with diabetic peripheral angiopathy without gangrene, with long-term current use of insulin (HCC)   (+) Type 2 diabetes mellitus with diabetic polyneuropathy, with long-term current use of insulin (HCC)      /RENAL   (+) Acute kidney injury superimposed on chronic kidney disease       HEMATOLOGY   (+) Symptomatic anemia      Cardiovascular and Mediastinum   (+) Chronic HFpEF/Moderate pHTN (HFpEF) (HCC)      Other   (+) History of endovascular stent graft for abdominal aortic aneurysm (AAA)   (+) Hx of CABG   (+) S/P angioplasty with stent   (+) S/P prostatectomy        CAD s/p CABG x 3 in 2003, on plavix (held) .   Recent fem aa bypass  Echo 6/2023: EF 50%, mild-mod LVH, mild global Hypokinesis, G2DD, mild to moderate AS, mild MR, mild TR       Anesthesia Plan  ASA Score- 3     Anesthesia Type- IV sedation with anesthesia with ASA Monitors.         Additional Monitors:     Airway Plan:            Plan Factors-    Chart reviewed. EKG reviewed.  Existing labs reviewed. Patient summary reviewed.    Patient is not a current smoker.              Induction- intravenous.    Postoperative Plan-     Informed Consent-             Procedure:  EGD    Relevant Problems   CARDIO   (+) Coronary artery disease involving native coronary artery of native heart without angina pectoris   (+) Frequent PVCs   (+) Hypertensive urgency   (+) Mild aortic stenosis   (+) Mixed hyperlipidemia   (+) Primary hypertension   (+) Stable angina pectoris   (+) Type 2 diabetes mellitus  with diabetic peripheral angiopathy without gangrene, with long-term current use of insulin (HCC)      ENDO   (+) Type 2 diabetes mellitus with diabetic peripheral angiopathy without gangrene, with long-term current use of insulin (HCC)      /RENAL   (+) Acute kidney injury superimposed on chronic kidney disease    (+) Chronic kidney disease-mineral and bone disorder      HEMATOLOGY   (+) Symptomatic anemia      NEURO/PSYCH   (+) Stable angina pectoris        Physical Exam    Airway    Mallampati score: III  TM Distance: >3 FB  Neck ROM: full     Dental   No notable dental hx     Cardiovascular      Pulmonary      Other Findings        Anesthesia Plan  ASA Score- 3     Anesthesia Type- IV sedation with anesthesia with ASA Monitors.         Additional Monitors:     Airway Plan:     Comment: Pt did not stop use of jardience. Spoke with Dr. Terrazas: will start with IV sedation and suction out secretions. If needed, will intubate if risk of aspiration great. Patient, CRNA, Dr. Terrazas all agrees. .       Plan Factors-    Chart reviewed.    Patient summary reviewed.    Patient is not a current smoker.              Induction- intravenous.    Postoperative Plan-     Informed Consent- Anesthetic plan and risks discussed with patient.  I personally reviewed this patient with the CRNA. Discussed and agreed on the Anesthesia Plan with the CRNA..

## 2023-12-26 NOTE — ASSESSMENT & PLAN NOTE
BP acceptable   Continue amlodipine with hold parameters for SBP < 130.   Hold evening doses of metoprolol and clonidine given HR in the 40-50s.  Resume in AM if bradycardia has improved.

## 2023-12-26 NOTE — ASSESSMENT & PLAN NOTE
"In the setting of melena/ suspected upper GI bleed.   Presented with SOB, fatigue, generalized weakness and melena.   Hg 6.9 on presentation with baseline Hgb 12-14 prior to his vascular procedure last month.   Received 1 unit of PRBC thus far with good response , Hgb 7.5 on 12/26 .     GI consult; EGD 12/27  \" Multiple small clean-based shallow superficial duodenal ulcers in the bulb.. In the setting antiplatelet therapy ulcers and blood could be the source of anemia\" with GI okay to restart DAPT with BID PPI and outpatient follow up    Checking Retic ct, folate , B12 and iron panel ; consistent with iron deficiency will start IV Venofer 200 mg X 3 doses / week   Trend H&H    "

## 2023-12-26 NOTE — ASSESSMENT & PLAN NOTE
"Lab Results   Component Value Date    HGBA1C 7.0 (H) 10/04/2023       No results for input(s): \"POCGLU\" in the last 72 hours.    Blood Sugar Average: Last 72 hrs:    Hold PO medications while inpatient.   Monitor accu-checks q6h  Continue home insulin regimen with Lantus 15 units HS  SSI coverage.   Hypoglycemia protocol.    "

## 2023-12-26 NOTE — ASSESSMENT & PLAN NOTE
CAD s/p CABG x 3 in 2003.  Admits to chest discomfort recently when he becomes very SOB.   Troponin 21, 21.   EKG without ischemic changes.   Hold ASA given melena.   Continue Imdur and Ranexa.

## 2023-12-26 NOTE — ASSESSMENT & PLAN NOTE
Lab Results   Component Value Date    EGFR 31 12/25/2023    EGFR 53 11/21/2023    EGFR 57 11/20/2023    CREATININE 1.93 (H) 12/25/2023    CREATININE 1.26 11/21/2023    CREATININE 1.19 11/20/2023     Baseline Cr 1.6-1.8 per most recent outpatient nephrology visit.   Cr above baseline on presentation.   Received 250 mL of NS in the ED.   Hold torsemide.   Avoid nephrotoxins and hypotension.   Monitor intake/output.   Repeat BMP in AM.

## 2023-12-26 NOTE — ASSESSMENT & PLAN NOTE
Presented with melena.   IV Protonix.   GI consult.   Keep NPO.   Hgb 6.9 on presentation.   Received 1 unit of PRBC in the ED. With good response , 7.5 g/dL on 12/26   Monitor Hgb q8h.

## 2023-12-26 NOTE — H&P
[facility]  [facility]  Name: Thomas Horne 78 y.o. male I MRN: [id number]  Unit/Bed#: ED-01 I Date of Admission: 12/25/2023   Date of Service: 12/26/2023 I Hospital Day: 1      Assessment/Plan   * Symptomatic anemia  Assessment & Plan  In the setting of melena/ suspected upper GI bleed.   Presented with SOB, fatigue, generalized weakness and melena.   Hg 6.9 on presentation with baseline Hgb 12-14 prior to his vascular procedure last month.   Received 1 unit of PRBC thus far.   GI consult.   Keep NPO.   Trend H&H    Melena  Assessment & Plan  Presented with melena.   IV Protonix.   GI consult.   Keep NPO.   Hgb 6.9 on presentation.   Received 1 unit of PRBC in the ED.   Monitor Hgb q8h.    Coronary artery disease involving native coronary artery of native heart without angina pectoris  Assessment & Plan  CAD s/p CABG x 3 in 2003.  Admits to chest discomfort recently when he becomes very SOB.   Troponin 21, 21.   EKG without ischemic changes. Monitor on telemetry.   Hold ASA and Plavix given melena.   Continue Imdur and Ranexa.     Chronic HFpEF/Moderate pHTN (HFpEF) ([facility])  Assessment & Plan  Wt Readings from Last 3 Encounters:   12/25/23 104 kg (229 lb 4.5 oz)   12/21/23 103 kg (228 lb)   12/21/23 101 kg (223 lb)       Appears euvolemic.   Echo 6/2023: EF 50%, G2DD, mild to moderate AS.  Hold torsemide given elevated Cr.   Monitor daily weights and intake/output.         Stage 3b chronic kidney disease (HCC)  Assessment & Plan  Lab Results   Component Value Date    EGFR 31 12/25/2023    EGFR 53 11/21/2023    EGFR 57 11/20/2023    CREATININE 1.93 (H) 12/25/2023    CREATININE 1.26 11/21/2023    CREATININE 1.19 11/20/2023     Baseline Cr 1.6-1.8 per most recent outpatient nephrology visit.   Cr above baseline on presentation.   Received 250 mL of NS in the ED.   Hold torsemide.   Avoid nephrotoxins and hypotension.   Monitor intake/output.   Repeat BMP in AM.     Peripheral artery disease  "([facility])  Assessment & Plan  S/p right common femoral to peroneal artery bypass on 11/16/23  Hold aspirin. Continue statin  Outpatient follow-up with vascular surgery.     Type 2 diabetes mellitus with diabetic polyneuropathy, with long-term current use of insulin ([facility])  Assessment & Plan  Lab Results   Component Value Date    HGBA1C 7.0 (H) 10/04/2023       No results for input(s): \"POCGLU\" in the last 72 hours.    Blood Sugar Average: Last 72 hrs:    Hold PO medications while inpatient.   Monitor accu-checks q6h  Continue home insulin regimen with Lantus 15 units HS  SSI coverage.   Hypoglycemia protocol.      Primary hypertension  Assessment & Plan  BP acceptable   Continue amlodipine with hold parameters for SBP < 130.   Hold evening doses of metoprolol and clonidine given HR in the 40-50s.  Resume in AM if bradycardia has improved.            VTE Pharmacologic Prophylaxis: VTE Score: 5 High Risk (Score >/= 5) - Pharmacological DVT Prophylaxis Contraindicated. Sequential Compression Devices Ordered.  Code Status: Level 3 DNAR/DNI  Discussion with family: Updated  (daughter) at bedside.    Anticipated Length of Stay: Patient will be admitted on an inpatient basis with an anticipated length of stay of greater than 2 midnights secondary to melena, anemia, need for GI eval.    Total Time Spent on Date of Encounter in care of patient:  This time was spent on one or more of the following: performing physical exam; counseling and coordination of care; obtaining or reviewing history; documenting in the medical record; reviewing/ordering tests, medications or procedures; communicating with other healthcare professionals and discussing with patient's family/caregivers.    Chief Complaint: SOB    History of Present Illness:  Thomas Horne is a 78 y.o. male with a PMH of CAD s/p CABG, CKD, type 2 DM, AAA s/p EVAR, CHF, HLD, HTN and prostate CA who presents with SOB. Patient reports that for the past few days he " has had worsening shortness of breath on exertion as well as fatigue, generalized weakness and dizziness. He also notes chest discomfort, described as a burning sensation throughout his chest when he becomes very SOB. He admits to melena recently as well as constipation. He denies hematuria, hematochezia, hematemesis or epistaxis. Patient underwent RLE bypass for a non-healing R heel wound on 11/16/23 and reports that since then he has been constipated despite no longer taking opioids. He has been taking Miralax and drinking prune juice without much improvement in his constipation. He denies any recent NSAID use but was receiving SC heparin post-op.     Review of Systems:  Review of Systems   Constitutional:  Negative for chills and fever.   Respiratory:  Positive for shortness of breath. Negative for cough.    Cardiovascular:  Positive for chest pain. Negative for leg swelling.   Gastrointestinal:  Positive for blood in stool (melena). Negative for abdominal pain, diarrhea, nausea and vomiting.   Neurological:  Positive for dizziness and weakness (generalized).   All other systems reviewed and are negative.      Past Medical and Surgical History:   Past Medical History:   Diagnosis Date    CAD (coronary artery disease)     Cancer (HCC)     prostate    CHF (congestive heart failure) (HCC)     Chronic kidney disease     Diabetes mellitus (HCC)     Hyperlipidemia     Hypertension     Myocardial infarction (HCC)     Neuropathy     Bilateral feet    Sleep apnea     Could not tolerate CPAP       Past Surgical History:   Procedure Laterality Date    ADENOIDECTOMY      CARDIAC CATHETERIZATION Left 10/19/2022    Procedure: Cardiac Left Heart Cath;  Surgeon: Danielle Pereira MD;  Location: [facility];  Service: Cardiology    CARDIAC SURGERY  2002    3 cardiac bypass then angioplasty 7/2020    CHOLECYSTECTOMY      COLONOSCOPY      CORONARY ARTERY BYPASS GRAFT      IR LOWER EXTREMITY ANGIOGRAM  11/1/2023    AR BYPASS  W/VEIN FEMORAL-POPLITEAL Right 11/16/2023    Procedure: BYPASS FEMORAL-POPLITEAL WITH CRYO VEIN, RIGHT FEMORAL ENDARTERECTOMY;  Surgeon: Vasquez Clark MD;  Location: [facility];  Service: Vascular    IA SLCTV CATHJ 3RD+ ORD SLCTV ABDL PEL/LXTR BRNCH Right 11/1/2023    Procedure: ARTERIOGRAM Right lower extremity arteriogram with CO2 via right groin access;  Surgeon: Vasquez Clark MD;  Location: [facility];  Service: Vascular    PROSTATE SURGERY      TONSILLECTOMY         Meds/Allergies:  Prior to Admission medications    Medication Sig Start Date End Date Taking? Authorizing Provider   acetaminophen (TYLENOL) 325 mg tablet Take 2 tablets (650 mg total) by mouth every 6 (six) hours as needed for mild pain 11/21/23   APOLOL Kwong   amLODIPine (NORVASC) 10 mg tablet Take 0.5 tablets (5 mg total) by mouth daily 7/26/23   Jose Fischer MD   aspirin 81 mg chewable tablet Chew 81 mg daily    Historical Provider, MD   atorvastatin (LIPITOR) 40 mg tablet Take 1 tablet (40 mg total) by mouth daily  Patient taking differently: Take 40 mg by mouth every evening 7/11/23   Danielle Pereira MD   Blood Glucose Monitoring Suppl (OneTouch Verio Reflect) w/Device KIT Check blood sugars twice daily. Please substitute with appropriate alternative as covered by patient's insurance. Dx: E11.65 2/22/22   Frank Lombardi, DO   cloNIDine (CATAPRES) 0.1 mg tablet take 1 tablet by mouth every 12 hours 11/28/23   Frank Lombardi, DO   clopidogrel (PLAVIX) 75 mg tablet TAKE 1 TABLET BY MOUTH DAILY 10/31/23   Danielle Pereira MD   Droplet Pen Needles 32G X 4 MM MISC USE EVERY EVENING 9/27/23   Frank Lombardi, DO   Elastic Bandages & Supports (Medical Compression Stockings) MISC Use daily Knee High 15-20mmHg 11/30/23   Vasquez Clark MD   Empagliflozin 25 MG TABS Take 1 tablet (25 mg total) by mouth daily 12/8/23 6/5/24  Frank Lombardi, DO   fenofibrate 160 MG tablet Take 1 tablet (160 mg total) by  mouth daily 7/11/23   Danielle Pereira MD   gabapentin (NEURONTIN) 600 MG tablet TAKE 1 TABLET BY MOUTH  TWICE DAILY 2/10/23   Sundar Arango DPM   glucose blood (OneTouch Verio) test strip TEST TWICE A DAY 12/8/23   Frank Lombardi, DO   insulin glargine (LANTUS) 100 units/mL subcutaneous injection Inject 15 Units under the skin daily at bedtime 11/21/23   APOLLO Kwong   isosorbide mononitrate (IMDUR) 60 mg 24 hr tablet Take 1 tablet (60 mg total) by mouth daily 7/11/23   Danielle Pereira MD   metoprolol tartrate (LOPRESSOR) 50 mg tablet Take 1 tablet (50 mg total) by mouth every 12 (twelve) hours 7/26/23   Jose Fischer MD   Multiple Vitamins-Minerals (MULTIVITAMIN MEN 50+ PO) Take by mouth daily    Historical Provider, MD   nitroglycerin (NITROSTAT) 0.4 mg SL tablet Place 1 tablet (0.4 mg total) under the tongue every 5 (five) minutes as needed for chest pain  Patient not taking: Reported on 12/20/2023 3/7/23   APOLLO Novoa   Omega-3 Fatty Acids (fish oil) 1,000 mg Take 4,000 mg by mouth 2 (two) times a day    Historical Provider, MD   omeprazole (PriLOSEC) 20 mg delayed release capsule 1 capsule 30 minutes before morning meal Orally Once a day for 30 days 12/1/23   Historical Provider, MD   ondansetron (ZOFRAN) 4 mg tablet Take 4 mg by mouth every 8 (eight) hours as needed for nausea or vomiting    Historical Provider, MD   OneTouch Delica Lancets 33G MISC Check blood sugars twice daily. Please substitute with appropriate alternative as covered by patient's insurance. Dx: E11.65 2/22/22   Frank Lombardi, DO   ranolazine (RANEXA) 500 mg 12 hr tablet take 1 tablet by mouth twice a day 11/7/23   Danielle Pereira MD   silver sulfadiazine (SILVADENE,SSD) 1 % cream Apply topically 2 (two) times a day 12/1/23 12/31/23  Sundar Arango DPM   sitaGLIPtin (JANUVIA) 100 mg tablet Take 1 tablet (100 mg total) by mouth daily 12/8/23   Frank Lombardi, DO   torsemide (DEMADEX) 20 mg tablet Take 1 tablet (20  mg total) by mouth daily 23   Danielle Pereira MD     I have reviewed home medications with a medical source (PCP, Pharmacy, other).    Allergies:   Allergies   Allergen Reactions    Lisinopril Rash and Lip Swelling       Social History:  Marital Status:    Occupation:   Patient Pre-hospital Living Situation: Home  Patient Pre-hospital Level of Mobility: walks  Patient Pre-hospital Diet Restrictions:   Substance Use History:   Social History     Substance and Sexual Activity   Alcohol Use Yes    Alcohol/week: 14.0 standard drinks of alcohol    Types: 14 Cans of beer per week    Comment: 1 -2 daily     Social History     Tobacco Use   Smoking Status Former    Current packs/day: 0.00    Average packs/day: 1.5 packs/day for 33.0 years (49.5 ttl pk-yrs)    Types: Cigarettes    Start date:     Quit date:     Years since quittin.0   Smokeless Tobacco Never     Social History     Substance and Sexual Activity   Drug Use Never       Family History:  Family History   Problem Relation Age of Onset    Diabetes Mother     Alcohol abuse Father     Mental illness Neg Hx        Physical Exam:     Vitals:   Blood Pressure: 130/63 (23 2300)  Pulse: (!) 50 (23 2300)  Temperature: 98 °F (36.7 °C) (23 2300)  Temp Source: Oral (23 2243)  Respirations: 18 (23 2300)  Weight - Scale: 104 kg (229 lb 4.5 oz) (23 1759)  SpO2: 96 % (23 2300)    Physical Exam  Vitals and nursing note reviewed.   Constitutional:       General: He is not in acute distress.     Appearance: He is not diaphoretic.   HENT:      Head: Normocephalic and atraumatic.   Eyes:      Conjunctiva/sclera: Conjunctivae normal.   Cardiovascular:      Rate and Rhythm: Regular rhythm. Bradycardia present.      Heart sounds: Murmur heard.   Pulmonary:      Effort: Pulmonary effort is normal. No respiratory distress.      Breath sounds: Normal breath sounds.   Abdominal:      General: Bowel sounds are normal.       Palpations: Abdomen is soft.      Tenderness: There is no abdominal tenderness.   Musculoskeletal:      Right lower leg: Edema (trace) present.      Left lower leg: No edema.   Skin:     General: Skin is warm and dry.      Coloration: Skin is not pale.   Neurological:      Mental Status: He is alert and oriented to person, place, and time. Mental status is at baseline.   Psychiatric:         Mood and Affect: Mood normal.          Additional Data:     Lab Results:  Results from last 7 days   Lab Units 12/25/23  1844   WBC Thousand/uL 8.49   HEMOGLOBIN g/dL 6.9*   HEMATOCRIT % 23.0*   PLATELETS Thousands/uL 277   NEUTROS PCT % 67   LYMPHS PCT % 23   MONOS PCT % 7   EOS PCT % 2     Results from last 7 days   Lab Units 12/25/23  1844   SODIUM mmol/L 135   POTASSIUM mmol/L 4.8   CHLORIDE mmol/L 101   CO2 mmol/L 26   BUN mg/dL 47*   CREATININE mg/dL 1.93*   ANION GAP mmol/L 8   CALCIUM mg/dL 9.4   ALBUMIN g/dL 3.9   TOTAL BILIRUBIN mg/dL 0.30   ALK PHOS U/L 37   ALT U/L 12   AST U/L 15   GLUCOSE RANDOM mg/dL 154*                       Lines/Drains:  Invasive Devices       Peripheral Intravenous Line  Duration             Peripheral IV 12/25/23 Left Antecubital <1 day    Peripheral IV 12/25/23 Right;Ventral (anterior) Forearm <1 day              Drain  Duration             External Urinary Catheter Medium 37 days                        Imaging: Reviewed radiology reports from this admission including: chest xray  XR chest 1 view portable   ED Interpretation by Jameson Lucas MD (12/25 2220)   Per my independent interpretation: No acute cardiopulmonary process          EKG and Other Studies Reviewed on Admission:   EKG: Sinus Bradycardia. HR 47 with 1st degree A-V block.    ** Please Note: This note has been constructed using a voice recognition system. **

## 2023-12-26 NOTE — ASSESSMENT & PLAN NOTE
CAD s/p CABG x 3 in 2003.  Admits to chest discomfort recently when he becomes very SOB.   Troponin 21, 21.   EKG without ischemic changes. Monitor on telemetry.   Hold ASA and Plavix given melena. See PAD A&P regarding restart DAPT vs monotherapy ; pending endoscopy results and GI eval   Continue Imdur and Ranexa.

## 2023-12-26 NOTE — PROGRESS NOTES
Formerly [facility], [facility]  Progress Note  Name: Thomas Horne I  MRN: [id number]  Unit/Bed#: S -01 I Date of Admission: 12/25/2023   Date of Service: 12/26/2023 I Hospital Day: 1    Assessment/Plan   * Symptomatic anemia  Assessment & Plan  In the setting of melena/ suspected upper GI bleed.   Presented with SOB, fatigue, generalized weakness and melena.   Hg 6.9 on presentation with baseline Hgb 12-14 prior to his vascular procedure last month.   Received 1 unit of PRBC thus far with good response , Hgb 7.5 on 12/26 .   GI consult; plan is for endoscopy today   Keep NPO.   Checking Retic ct, folate , B12 and iron panel   Trend H&H    Melena  Assessment & Plan  Presented with melena.   IV Protonix.   GI consult.   Keep NPO.   Hgb 6.9 on presentation.   Received 1 unit of PRBC in the ED. With good response , 7.5 g/dL on 12/26   Monitor Hgb q8h.    Acute kidney injury superimposed on chronic kidney disease   Assessment & Plan  Lab Results   Component Value Date    EGFR 35 12/26/2023    EGFR 31 12/25/2023    EGFR 53 11/21/2023    CREATININE 1.79 (H) 12/26/2023    CREATININE 1.93 (H) 12/25/2023    CREATININE 1.26 11/21/2023     Suspected pre-renal / vascular depletion ; relatively improved post IVF bolus in the ED ; continue IV hydration while NPO, avoid nephrotoxic agents and trend     Chronic HFpEF/Moderate pHTN (HFpEF) (HCC)  Assessment & Plan  Wt Readings from Last 3 Encounters:   12/25/23 104 kg (229 lb 4.5 oz)   12/21/23 103 kg (228 lb)   12/21/23 101 kg (223 lb)       Appears euvolemic.   Echo 6/2023: EF 50%, G2DD, mild to moderate AS.  Hold torsemide given elevated Cr.   Monitor daily weights and intake/output.         Acute kidney injury superimposed on chronic kidney disease   Assessment & Plan  Lab Results   Component Value Date    EGFR 35 12/26/2023    EGFR 31 12/25/2023    EGFR 53 11/21/2023    CREATININE 1.79 (H) 12/26/2023    CREATININE 1.93 (H) 12/25/2023    CREATININE 1.26 11/21/2023      Baseline Cr 1.6-1.8 per most recent outpatient nephrology visit.   Cr above baseline on presentation.   Received 250 mL of NS in the ED.   Hold torsemide.   Avoid nephrotoxins and hypotension.   Monitor intake/output.   Repeat BMP in AM.     Peripheral artery disease (HCC)  Assessment & Plan  S/p right common femoral to peroneal artery bypass on 11/16/23  Hold aspirin & Statin . Continue statin  Outpatient follow-up with vascular surgery.   s/p R fem-pop bypass w/ R femoral endarterectomy performed by Dr. Clark on 11/16/23 ; currently his DAPT on hold due to suspected GI bleed ; pending endoscopy results may consider and discuss with vascular and cardiology if appropriate to downgrade to monotherapy vs restart DAPT     Type 2 diabetes mellitus with diabetic polyneuropathy, with long-term current use of insulin ([facility])  Assessment & Plan  Lab Results   Component Value Date    HGBA1C 7.0 (H) 10/04/2023       Recent Labs     12/26/23  0601   POCGLU 113       Blood Sugar Average: Last 72 hrs:  (P) 113  Hold PO medications while inpatient.   Monitor accu-checks q6h  Decreased home Lantus 15 to 8 units HS while NPO   SSI coverage.   Hypoglycemia protocol.      Coronary artery disease involving native coronary artery of native heart without angina pectoris  Assessment & Plan  CAD s/p CABG x 3 in 2003.  Admits to chest discomfort recently when he becomes very SOB.   Troponin 21, 21.   EKG without ischemic changes. Monitor on telemetry.   Hold ASA and Plavix given melena. See PAD A&P regarding restart DAPT vs monotherapy ; pending endoscopy results and GI eval   Continue Imdur and Ranexa.     Primary hypertension  Assessment & Plan  BP acceptable   Continue amlodipine with hold parameters for SBP < 130.   Hold evening doses of metoprolol and clonidine given HR in the 40-50s.  Resume in AM if bradycardia has improved.                  VTE Pharmacologic Prophylaxis: VTE Score: 5  on hold due to suspected GI Bleed      Mobility:      HLM Goal achieved. Continue to encourage appropriate mobility.    Patient Centered Rounds: I performed bedside rounds with nursing staff today.   Discussions with Specialists or Other Care Team Provider: with GI team, plan for endoscopy     Education and Discussions with Family / Patient: Patient declined call to .     Total Time Spent on Date of Encounter in care of patient: 50 mins. This time was spent on one or more of the following: performing physical exam; counseling and coordination of care; obtaining or reviewing history; documenting in the medical record; reviewing/ordering tests, medications or procedures; communicating with other healthcare professionals and discussing with patient's family/caregivers.    Current Length of Stay: 1 day(s)  Current Patient Status: Inpatient   Certification Statement: The patient will continue to require additional inpatient hospital stay due to pending GI eval / endoscopy and further management for anemia   Discharge Plan:  pending clinical course     Code Status: Level 3 - DNAR and DNI    Subjective:   Patient was seen and examined today while in the ED he is sitting in bed in no acute distress, denies any symptoms at the time of exam.  He confirms the HPI as reported on admission that he has been having dark stool brown/black at home at least for the past week; he has been on ASA and Plavix for CAD and PVD as above, which is currently held. Patient is aware, and awaiting endoscopy today and he had no other question or concern at the moment. He denies any abdominal pain, N/V.     Objective:     Vitals:   Temp (24hrs), Av.9 °F (36.6 °C), Min:97.3 °F (36.3 °C), Max:98.5 °F (36.9 °C)    Temp:  [97.3 °F (36.3 °C)-98.5 °F (36.9 °C)] 98.5 °F (36.9 °C)  HR:  [49-64] 57  Resp:  [18-20] 18  BP: (117-144)/(55-66) 139/64  SpO2:  [93 %-99 %] 98 %  Body mass index is 29.44 kg/m².     Input and Output Summary (last 24 hours):     Intake/Output  Summary (Last 24 hours) at 12/26/2023 1816  Last data filed at 12/26/2023 1542  Gross per 24 hour   Intake 1060.42 ml   Output --   Net 1060.42 ml       Physical Exam:   Physical Exam   - GEN: Appears well, alert and oriented x 3, pleasant and cooperative, in no acute distress  - HEENT: Anicteric, mucous membranes moist, PERRL and EOMI   - NECK: No lymphadenopathy, JVD or carotid bruits   - HEART: RRR, normal S1 and S2, no murmurs, clicks, gallops or rubs   - LUNGS: Clear to auscultation bilaterally; no wheezes, rales, or rhonchi  - ABDOMEN: Normal bowel sounds, soft, no tenderness, no distention, no organomegaly or masses felt on exam.   - EXTREMITIES: Peripheral pulses normal; no clubbing, cyanosis, or edema  - NEURO: No focal findings, CN II-XII are grossly intact.   - Musculoskeletal: 5/5 strength, normal ROM, no swollen or erythematous joints.   - SKIN: Normal without suspicious lesions on exposed skin      Additional Data:     Labs:  Results from last 7 days   Lab Units 12/26/23  1715 12/26/23  0748 12/25/23  1844   WBC Thousand/uL  --   --  8.49   HEMOGLOBIN g/dL 7.4*   < > 6.9*   HEMATOCRIT %  --   --  23.0*   PLATELETS Thousands/uL  --   --  277   NEUTROS PCT %  --   --  67   LYMPHS PCT %  --   --  23   MONOS PCT %  --   --  7   EOS PCT %  --   --  2    < > = values in this interval not displayed.     Results from last 7 days   Lab Units 12/26/23  0451 12/25/23  1844   SODIUM mmol/L 136 135   POTASSIUM mmol/L 3.9 4.8   CHLORIDE mmol/L 103 101   CO2 mmol/L 27 26   BUN mg/dL 43* 47*   CREATININE mg/dL 1.79* 1.93*   ANION GAP mmol/L 6 8   CALCIUM mg/dL 8.7 9.4   ALBUMIN g/dL  --  3.9   TOTAL BILIRUBIN mg/dL  --  0.30   ALK PHOS U/L  --  37   ALT U/L  --  12   AST U/L  --  15   GLUCOSE RANDOM mg/dL 114 154*         Results from last 7 days   Lab Units 12/26/23  1714 12/26/23  1104 12/26/23  0601   POC GLUCOSE mg/dl 128 113 113               Lines/Drains:  Invasive Devices       Peripheral Intravenous Line   Duration             Peripheral IV 12/25/23 Left Antecubital <1 day    Peripheral IV 12/25/23 Right;Ventral (anterior) Forearm <1 day                      Telemetry:  Telemetry Orders (From admission, onward)               24 Hour Telemetry Monitoring  Continuous x 24 Hours (Telem)        Question:  Reason for 24 Hour Telemetry  Answer:  PCI/EP study (including pacer and ICD implementation), Cardiac surgery, MI, abnormal cardiac cath, and chest pain- rule out MI                     Telemetry Reviewed: Sinus Bradycardia  Indication for Continued Telemetry Use: No indication for continued use. Will discontinue.              Imaging: Reviewed radiology reports from this admission including: chest xray    Recent Cultures (last 7 days):         Last 24 Hours Medication List:   Current Facility-Administered Medications   Medication Dose Route Frequency Provider Last Rate    amLODIPine  5 mg Oral Daily Fran Terrazas MD      atorvastatin  40 mg Oral QPM Fran Terrazas MD      fenofibrate  145 mg Oral Daily Fran Terrazas MD      gabapentin  600 mg Oral BID Fran Terrazas MD      insulin glargine  8 Units Subcutaneous HS Fran Terrazas MD      insulin lispro  1-6 Units Subcutaneous Q6H CONSTANTINE Fran Terrazas MD      isosorbide mononitrate  60 mg Oral Daily Fran Terrazas MD      ondansetron  4 mg Intravenous Q6H PRN Fran Terrazas MD      pantoprazole  40 mg Intravenous Q12H CONSTANTINE Fran Terrazas MD      ranolazine  500 mg Oral BID Fran Terrazas MD          Today, Patient Was Seen By: Lowell Giron DO    **Please Note: This note may have been constructed using a voice recognition system.**

## 2023-12-26 NOTE — ASSESSMENT & PLAN NOTE
In the setting of melena/ suspected upper GI bleed.   Presented with SOB, fatigue, generalized weakness and melena.   Hg 6.9 on presentation with baseline Hgb 12-14 prior to his vascular procedure last month.   Received 1 unit of PRBC thus far with good response , Hgb 7.5 on 12/26 .   GI consult; plan is for endoscopy today   Keep NPO.   Checking Retic ct, folate , B12 and iron panel   Trend H&H

## 2023-12-26 NOTE — ASSESSMENT & PLAN NOTE
Wt Readings from Last 3 Encounters:   12/25/23 104 kg (229 lb 4.5 oz)   12/21/23 103 kg (228 lb)   12/21/23 101 kg (223 lb)       Appears euvolemic.   Echo 6/2023: EF 50%, G2DD, mild to moderate AS.  Hold torsemide given elevated Cr.   Monitor daily weights and intake/output.

## 2023-12-26 NOTE — ANESTHESIA POSTPROCEDURE EVALUATION
Post-Op Assessment Note    CV Status:  Stable  Pain Score: 0    Pain management: adequate       Mental Status:  Alert and awake   Hydration Status:  Euvolemic and stable   PONV Controlled:  Controlled   Airway Patency:  Patent and adequate     Post Op Vitals Reviewed: Yes                  BP      Temp      Pulse     Resp      SpO2

## 2023-12-26 NOTE — ASSESSMENT & PLAN NOTE
CAD s/p CABG x 3 in 2003.  Admits to chest discomfort recently when he becomes very SOB.   Troponin 21, 21.   EKG without ischemic changes. Monitor on telemetry.   Hold ASA and Plavix given melena. Per GI okay to restart DAPT on discharge  Continue Imdur and Ranexa.

## 2023-12-26 NOTE — ASSESSMENT & PLAN NOTE
Presented with melena.   IV Protonix.   GI consult.   Keep NPO.   Hgb 6.9 on presentation.   Received 1 unit of PRBC in the ED.   Monitor Hgb q8h.

## 2023-12-26 NOTE — ASSESSMENT & PLAN NOTE
CAD s/p CABG x 3 in 2003.  Admits to chest discomfort recently when he becomes very SOB.   Troponin 21, 21.   EKG without ischemic changes. Monitor on telemetry.   Hold ASA and Plavix given melena.   Continue Imdur and Ranexa.

## 2023-12-26 NOTE — ASSESSMENT & PLAN NOTE
S/p right common femoral to peroneal artery bypass on 11/16/23  Hold aspirin & Statin . Continue statin  Outpatient follow-up with vascular surgery.   s/p R fem-pop bypass w/ R femoral endarterectomy performed by Dr. Clark on 11/16/23 ; currently his DAPT on hold due to suspected GI bleed ; pending endoscopy results may consider and discuss with vascular and cardiology if appropriate to downgrade to monotherapy vs restart DAPT

## 2023-12-26 NOTE — ANESTHESIA PREPROCEDURE EVALUATION
Procedure:  PRE-OP ONLY    Relevant Problems   CARDIO   (+) Coronary artery disease involving native coronary artery of native heart without angina pectoris   (+) Hypertensive urgency   (+) Mild aortic stenosis   (+) Mixed hyperlipidemia   (+) Peripheral artery disease (HCC)   (+) Primary hypertension   (+) Type 2 diabetes mellitus with diabetic peripheral angiopathy without gangrene, with long-term current use of insulin (HCC)      ENDO   (+) Type 2 diabetes mellitus with diabetic peripheral angiopathy without gangrene, with long-term current use of insulin (HCC)   (+) Type 2 diabetes mellitus with diabetic polyneuropathy, with long-term current use of insulin (HCC)      /RENAL   (+) Acute kidney injury superimposed on chronic kidney disease       HEMATOLOGY   (+) Symptomatic anemia      Cardiovascular and Mediastinum   (+) Chronic HFpEF/Moderate pHTN (HFpEF) (HCC)      Other   (+) History of endovascular stent graft for abdominal aortic aneurysm (AAA)   (+) Hx of CABG   (+) S/P angioplasty with stent   (+) S/P prostatectomy        CAD s/p CABG x 3 in 2003, on plavix (held) .   Recent fem aa bypass  Echo 6/2023: EF 50%, mild-mod LVH, mild global Hypokinesis, G2DD, mild to moderate AS, mild MR, mild TR       Anesthesia Plan  ASA Score- 3     Anesthesia Type- IV sedation with anesthesia with ASA Monitors.         Additional Monitors:     Airway Plan:            Plan Factors-    Chart reviewed. EKG reviewed.  Existing labs reviewed. Patient summary reviewed.    Patient is not a current smoker.              Induction- intravenous.    Postoperative Plan-     Informed Consent-

## 2023-12-26 NOTE — ASSESSMENT & PLAN NOTE
Lab Results   Component Value Date    EGFR 40 12/27/2023    EGFR 35 12/26/2023    EGFR 31 12/25/2023    CREATININE 1.58 (H) 12/27/2023    CREATININE 1.79 (H) 12/26/2023    CREATININE 1.93 (H) 12/25/2023     Baseline Cr 1.6-1.8 per most recent outpatient nephrology visit.   Cr above baseline on presentation.   Received 250 mL of NS in the ED.   Hold torsemide.   Avoid nephrotoxins and hypotension.   Monitor intake/output.   Repeat BMP in AM.     Suspected pre-renal / vascular depletion ; relatively improved post IVF bolus in the ED ; continue IV hydration while NPO, avoid nephrotoxic agents and trend   Cr continue to trend down / improving

## 2023-12-26 NOTE — CONSULTS
Consultation -  [facility]  Thomas Horne 78 y.o. male MRN: [id number]  Unit/Bed#: ED-01 Encounter: [id number]        Inpatient consult to gastroenterology  Consult performed by: Celsa Kaufman PA-C  Consult ordered by: Lara Main PA-C          Reason for Consult / Principal Problem: Acute anemia, melena    ASSESSMENT and PLAN:      78-year-old male with history of CAD on Plavix, recent femoral arterial bypass, CKD who presented to the emergency room for shortness of breath found to have worsening anemia at 6.9, previously normal 2 months ago with dark stools.    Acute anemia, symptomatic  Dark stools  Reports following surgery after being in rehab he began to notice dark black stools though in the setting of constipation with bowel movement every few days.  Also reports decreased appetite and early satiety.  Possible few pound weight loss.  Denies NSAID use.  Began having worsening shortness of breath and dyspnea on exertion with weakness that worsened over the past few days. Hgb 6.9 on arrival, now 7.5 after 1 unit. VSS.    -Patient's initial hemoglobin drop occurred immediately following his femoral bypass, though now with new development of dark stools and decreased appetite.  Will underlying peptic ulcer disease, AVMs or lesion.  However, consider surgical cause of blood loss as well.    -Will plan for EGD today.  - Keep NPO.  -Increase PPI to twice daily.    -Monitor hemoglobin and transfuse as needed per primary team.  - Monitor stool output  -After EGD, start MiraLAX daily.    -Patient is on Plavix which is currently being held.    -Further recommendations after EGD, consider colonoscopy if unremarkable.  -Spoke with primary team, Dr. Giron. Recommended consideration for imaging to rule out complications from surgery.    -------------------------------------------------------------------------------------------------------------------    HPI:     Thomas Horne is a  78-year-old male with history of CAD on Plavix and aspirin, CKD, PAD with recent femoral peroneal artery bypass who presented to the emergency room yesterday for shortness of breath and melena.  Vital signs stable on arrival.  He was found to have hemoglobin of 6.9, previously normal around 2 months ago at 14.    Patient reports developing worsening shortness of breath and dyspnea on exertion since after rehab.  He then reported severe worsening of weakness in his legs over the past few days which led to his ER visit.  He reports since being in rehab following his surgery he had new development of dark stools.  Denies iron or Pepto-Bismol.  He reports having constipation with bowel movements every other day or every 2 days.  Last bowel movement was yesterday.  He reports they are very dark which is unusual for him.  Denies any bright red blood.  Also reports associated decreased appetite with early satiety and believes he possibly lost a few pounds.  He denies any NSAIDs.  Reports he has been using Tylenol for pain relief from recent surgery.    Abdominal surgeries consistent for cholecystectomy.    Reports family history of colon cancer in brother and sister.    Reports having EGD many years ago.  Last colonoscopy was at the age of 73.    Of note, patient's initial hemoglobin drop was immediately after surgery.  Her hemoglobin prior to surgery was 14.7 and was 9.1 a few hours after surgery.  He was discharged with a hemoglobin of 7.7.  He denies any bruising, pain or swelling of his right extremity.      REVIEW OF SYSTEMS:    CONSTITUTIONAL: Denies any fever, chills, or rigors. Decreased appetite, few lb weight loss, weakness  HEENT: No earache or tinnitus. Denies hearing loss or visual disturbances.  CARDIOVASCULAR: No chest pain or palpitations.   RESPIRATORY: +SOB, OQUENDO  GASTROINTESTINAL: As noted in the History of Present Illness.   GENITOURINARY: No problems with urination. Denies any hematuria or  dysuria.  NEUROLOGIC: No dizziness or vertigo, denies headaches.   MUSCULOSKELETAL: Denies any muscle or joint pain.   SKIN: Denies skin rashes or itching.   ENDOCRINE: Denies excessive thirst. Denies intolerance to heat or cold.  PSYCHOSOCIAL: Denies depression or anxiety. Denies any recent memory loss.       Historical Information   Past Medical History:   Diagnosis Date    CAD (coronary artery disease)     Cancer (HCC)     prostate    CHF (congestive heart failure) (HCC)     Chronic kidney disease     Diabetes mellitus (HCC)     Hyperlipidemia     Hypertension     Myocardial infarction (HCC)     Neuropathy     Bilateral feet    Sleep apnea     Could not tolerate CPAP     Past Surgical History:   Procedure Laterality Date    ADENOIDECTOMY      CARDIAC CATHETERIZATION Left 10/19/2022    Procedure: Cardiac Left Heart Cath;  Surgeon: Danielle Pereira MD;  Location: [facility];  Service: Cardiology    CARDIAC SURGERY  2002    3 cardiac bypass then angioplasty 7/2020    CHOLECYSTECTOMY      COLONOSCOPY      CORONARY ARTERY BYPASS GRAFT      IR LOWER EXTREMITY ANGIOGRAM  11/1/2023    TX BYPASS W/VEIN FEMORAL-POPLITEAL Right 11/16/2023    Procedure: BYPASS FEMORAL-POPLITEAL WITH CRYO VEIN, RIGHT FEMORAL ENDARTERECTOMY;  Surgeon: Vasquez Clark MD;  Location: [facility];  Service: Vascular    TX SLCTV CATHJ 3RD+ ORD SLCTV ABDL PEL/LXTR BRNCH Right 11/1/2023    Procedure: ARTERIOGRAM Right lower extremity arteriogram with CO2 via right groin access;  Surgeon: Vasquez Clark MD;  Location: [facility];  Service: Vascular    PROSTATE SURGERY      TONSILLECTOMY       Social History   Social History     Substance and Sexual Activity   Alcohol Use Yes    Alcohol/week: 14.0 standard drinks of alcohol    Types: 14 Cans of beer per week    Comment: 1 -2 daily     Social History     Substance and Sexual Activity   Drug Use Never     Social History     Tobacco Use   Smoking Status Former    Current  packs/day: 0.00    Average packs/day: 1.5 packs/day for 33.0 years (49.5 ttl pk-yrs)    Types: Cigarettes    Start date:     Quit date:     Years since quittin.0   Smokeless Tobacco Never     Family History   Problem Relation Age of Onset    Diabetes Mother     Alcohol abuse Father     Mental illness Neg Hx        Meds/Allergies     (Not in a hospital admission)    Current Facility-Administered Medications   Medication Dose Route Frequency    amLODIPine (NORVASC) tablet 5 mg  5 mg Oral Daily    atorvastatin (LIPITOR) tablet 40 mg  40 mg Oral QPM    fenofibrate (TRICOR) tablet 145 mg  145 mg Oral Daily    gabapentin (NEURONTIN) capsule 600 mg  600 mg Oral BID    insulin glargine (LANTUS) subcutaneous injection 8 Units 0.08 mL  8 Units Subcutaneous HS    insulin lispro (HumaLOG) 100 units/mL subcutaneous injection 1-6 Units  1-6 Units Subcutaneous Q6H CONSTANTINE    isosorbide mononitrate (IMDUR) 24 hr tablet 60 mg  60 mg Oral Daily    ondansetron (ZOFRAN) injection 4 mg  4 mg Intravenous Q6H PRN    pantoprazole (PROTONIX) injection 40 mg  40 mg Intravenous Q24H CONSTANTINE    ranolazine (RANEXA) 12 hr tablet 500 mg  500 mg Oral BID    sodium chloride 0.9 % infusion  75 mL/hr Intravenous Continuous       Allergies   Allergen Reactions    Lisinopril Rash and Lip Swelling           Objective     Blood pressure 134/63, pulse (!) 54, temperature 97.9 °F (36.6 °C), temperature source Oral, resp. rate 20, weight 104 kg (229 lb 4.5 oz), SpO2 95%.      Intake/Output Summary (Last 24 hours) at 2023 0849  Last data filed at 2023 0236  Gross per 24 hour   Intake 760.42 ml   Output --   Net 760.42 ml         PHYSICAL EXAM:      General Appearance:   Alert, cooperative, no distress, appears stated age    HEENT:   Normocephalic, atraumatic, anicteric, no oropharyngeal thrush present.     Neck:  Supple, symmetrical, trachea midline, no adenopathy;    thyroid: no enlargement/tenderness/nodules; no carotid  bruit or JVD     Lungs:   Clear to auscultation bilaterally; no rales, rhonchi or wheezing; respirations unlabored    Heart::   S1 and S2 normal; regular rate and rhythm; no murmur, rub, or gallop.   Abdomen:   Soft, non-tender, non-distended; normal bowel sounds; no masses, no organomegaly. Benign abdomen   Genitalia:   Deferred    Rectal:   Deferred    Extremities:  No cyanosis, clubbing or edema. Femoral surgical site in tact. No bruising noted throughout R extremity.    Pulses:  2+ and symmetric all extremities    Skin:  Skin color, texture, turgor normal, no rashes or lesions    Lymph nodes:  No palpable cervical, axillary or inguinal lymphadenopathy        Lab Results:   Results from last 7 days   Lab Units 12/26/23  0748 12/25/23  1844   WBC Thousand/uL  --  8.49   HEMOGLOBIN g/dL 7.5* 6.9*   HEMATOCRIT %  --  23.0*   PLATELETS Thousands/uL  --  277   NEUTROS PCT %  --  67   LYMPHS PCT %  --  23   MONOS PCT %  --  7   EOS PCT %  --  2     Results from last 7 days   Lab Units 12/26/23  0451 12/25/23  1844   POTASSIUM mmol/L 3.9 4.8   CHLORIDE mmol/L 103 101   CO2 mmol/L 27 26   BUN mg/dL 43* 47*   CREATININE mg/dL 1.79* 1.93*   CALCIUM mg/dL 8.7 9.4   ALK PHOS U/L  --  37   ALT U/L  --  12   AST U/L  --  15               Imaging Studies: I have personally reviewed pertinent imaging studies.    No results found.        Patient was seen and examined by Dr. Terrazas. All sidhu medical decisions were made by Dr. Terrazas. Thank you for allowing us to participate in the care of this present patient. We will follow-up with you closely.

## 2023-12-26 NOTE — ASSESSMENT & PLAN NOTE
Lab Results   Component Value Date    HGBA1C 7.0 (H) 10/04/2023       Recent Labs     12/26/23  0601   POCGLU 113       Blood Sugar Average: Last 72 hrs:  (P) 113  Hold PO medications while inpatient.   Monitor accu-checks q6h  Decreased home Lantus 15 to 8 units HS while NPO   SSI coverage.   Hypoglycemia protocol.

## 2023-12-26 NOTE — UTILIZATION REVIEW
Initial Clinical Review    Admission: Date/Time/Statement:   Admission Orders (From admission, onward)       Ordered        12/25/23 1945  INPATIENT ADMISSION  Once                          Orders Placed This Encounter   Procedures    INPATIENT ADMISSION     Standing Status:   Standing     Number of Occurrences:   1     Order Specific Question:   Level of Care     Answer:   Med Surg [16]     Order Specific Question:   Estimated length of stay     Answer:   More than 2 Midnights     Order Specific Question:   Certification     Answer:   I certify that inpatient services are medically necessary for this patient for a duration of greater than two midnights. See H&P and MD Progress Notes for additional information about the patient's course of treatment.     ED Arrival Information       Expected   -    Arrival   12/25/2023 17:31    Acuity   Urgent              Means of arrival   Walk-In    Escorted by   Family Member    Service   Hospitalist    Admission type   Emergency              Arrival complaint   shortness of breath             Chief Complaint   Patient presents with    Shortness of Breath       Initial Presentation: 78 y.o. male with a PMH of CAD s/p CABG, CKD, type 2 DM, AAA s/p EVAR, CHF, HLD, HTN and prostate CA who presents with SOB. Patient reports that for the past few days he has had worsening shortness of breath on exertion as well as fatigue, generalized weakness and dizziness. He also notes chest discomfort, described as a burning sensation throughout his chest when he becomes very SOB. He admits to melena recently as well as constipation. He denies hematuria, hematochezia, hematemesis or epistaxis. Patient underwent RLE bypass for a non-healing R heel wound on 11/16/23 and reports that since then he has been constipated despite no longer taking opioids. He has been taking Miralax and drinking prune juice without much improvement in his constipation. Plan: Inpatient admission for evaluation and  treatment of symptomatic anemia, melena, CAD, CHF, stage 3b CKD, PAD, DM, HTN: 1 unit PRBC, GI consult, NPO, trend H&H q 8 hrs, IV Protonix, hold ASA and Plavix, continue Imdur and Ranexa, hold torsemdie, BMP in am, hold oral antihyperglycemics, continue home insulin regimen, start SSI, continue amlodipine, hold metoprolol this evening and resume in am if bradycardia has improved.     Date: 12/26   Day 2:     GI consult: plan for EGD today, keep NPO, increase PPI to bid, start Miralax daily after EGD, continue holding Plavix.     Date: 12/27    Day 3: Has surpassed a 2nd midnight with active treatments and services, which include EGD.      ED Triage Vitals   Temperature Pulse Respirations Blood Pressure SpO2   12/25/23 1759 12/25/23 1759 12/25/23 1759 12/25/23 1759 12/25/23 1800   97.6 °F (36.4 °C) (!) 48 20 148/66 100 %      Temp Source Heart Rate Source Patient Position - Orthostatic VS BP Location FiO2 (%)   12/25/23 1759 12/25/23 2100 12/25/23 1759 12/25/23 1759 --   Oral Monitor Sitting Right arm       Pain Score       12/25/23 1759       No Pain          Wt Readings from Last 1 Encounters:   12/25/23 104 kg (229 lb 4.5 oz)     Additional Vital Signs:     Date/Time Temp Pulse Resp BP MAP (mmHg) SpO2 O2 Device   12/26/23 1045 -- 57 18 136/61 88 98 % None (Room air)   12/26/23 0823 -- -- -- 134/63 -- -- --   12/26/23 0815 -- 54 Abnormal  20 135/62 89 95 % None (Room air)   12/26/23 0600 -- 55 18 123/60 86 97 % None (Room air)   12/26/23 0430 -- 51 Abnormal  18 134/63 90 96 % None (Room air)   12/26/23 0345 -- 51 Abnormal  18 141/66 95 96 % None (Room air)   12/26/23 0143 97.9 °F (36.6 °C) 49 Abnormal  18 137/63 -- 96 % None (Room air)   12/26/23 0100 97.9 °F (36.6 °C) 51 Abnormal  18 135/63 90 96 % None (Room air)   12/26/23 0043 97.9 °F (36.6 °C) 51 Abnormal  18 126/61 -- 95 % None (Room air)   12/26/23 0030 -- 51 Abnormal  18 131/60 87 95 % None (Room air)   12/25/23 2300 98 °F (36.7 °C) 50 Abnormal  18 130/63  90 96 % None (Room air)   12/25/23 2243 98.1 °F (36.7 °C) 50 Abnormal  18 117/55 -- 99 % None (Room air)   12/25/23 2231 98.2 °F (36.8 °C) 51 Abnormal  20 122/60 -- -- --   12/25/23 2100 -- 51 Abnormal  20 129/59 85 98 % None (Room air)   12/25/23 1800 -- -- -- -- -- 100 % --     Pertinent Labs/Diagnostic Test Results:   XR chest 1 view portable   ED Interpretation by Jameson Lucas MD (12/25 2220)   Per my independent interpretation: No acute cardiopulmonary process      Final Result by Soraya Trinidad MD (12/26 1019)      Mild pulmonary venous congestion.               Workstation performed: [id number]           Results from last 7 days   Lab Units 12/25/23  1844   SARS-COV-2  Negative     Results from last 7 days   Lab Units 12/26/23  0748 12/25/23  1844   WBC Thousand/uL  --  8.49   HEMOGLOBIN g/dL 7.5* 6.9*   HEMATOCRIT %  --  23.0*   PLATELETS Thousands/uL  --  277   NEUTROS ABS Thousands/µL  --  5.74     Results from last 7 days   Lab Units 12/26/23  0748   RETIC CT ABS  103,400   RETIC CT PCT % 3.72*     Results from last 7 days   Lab Units 12/26/23  0451 12/25/23  1844   SODIUM mmol/L 136 135   POTASSIUM mmol/L 3.9 4.8   CHLORIDE mmol/L 103 101   CO2 mmol/L 27 26   ANION GAP mmol/L 6 8   BUN mg/dL 43* 47*   CREATININE mg/dL 1.79* 1.93*   EGFR ml/min/1.73sq m 35 31   CALCIUM mg/dL 8.7 9.4     Results from last 7 days   Lab Units 12/25/23  1844   AST U/L 15   ALT U/L 12   ALK PHOS U/L 37   TOTAL PROTEIN g/dL 7.2   ALBUMIN g/dL 3.9   TOTAL BILIRUBIN mg/dL 0.30     Results from last 7 days   Lab Units 12/26/23  1104 12/26/23  0601   POC GLUCOSE mg/dl 113 113     Results from last 7 days   Lab Units 12/26/23  0451 12/25/23  1844   GLUCOSE RANDOM mg/dL 114 154*           Results from last 7 days   Lab Units 12/25/23  2242 12/25/23  1844   HS TNI 0HR ng/L  --  21   HS TNI 2HR ng/L 21  --    HSTNI D2 ng/L 0  --            Results from last 7 days   Lab Units 12/25/23  1844   BNP pg/mL 452*     Results  from last 7 days   Lab Units 12/26/23  0451   FERRITIN ng/mL 12*         Results from last 7 days   Lab Units 12/26/23  0547   UNIT PRODUCT CODE  [id number]   UNIT NUMBER  [id number]   UNITABO  A   UNITRH  POS   CROSSMATCH  Compatible   UNIT DISPENSE STATUS  Presumed Trans   UNIT PRODUCT VOL ml 300             Results from last 7 days   Lab Units 12/25/23  1844   INFLUENZA A PCR  Negative   INFLUENZA B PCR  Negative   RSV PCR  Negative         ED Treatment:   Medication Administration from 12/25/2023 1731 to 12/26/2023 1230         Date/Time Order Dose Route Action     12/25/2023 2238 EST sodium chloride 0.9 % bolus 250 mL 250 mL Intravenous New Bag     12/26/2023 0823 EST amLODIPine (NORVASC) tablet 5 mg 5 mg Oral Given     12/26/2023 0824 EST fenofibrate (TRICOR) tablet 145 mg 145 mg Oral Given     12/26/2023 0823 EST gabapentin (NEURONTIN) capsule 600 mg 600 mg Oral Given     12/26/2023 0824 EST isosorbide mononitrate (IMDUR) 24 hr tablet 60 mg 60 mg Oral Given     12/26/2023 0824 EST ranolazine (RANEXA) 12 hr tablet 500 mg 500 mg Oral Given     12/26/2023 0825 EST pantoprazole (PROTONIX) injection 40 mg 40 mg Intravenous Given     12/26/2023 0759 EST ondansetron (ZOFRAN) injection 4 mg 4 mg Intravenous Given     12/26/2023 0829 EST sodium chloride 0.9 % infusion 75 mL/hr Intravenous New Bag          Past Medical History:   Diagnosis Date    CAD (coronary artery disease)     Cancer (HCC)     prostate    CHF (congestive heart failure) (HCC)     Chronic kidney disease     Diabetes mellitus (HCC)     Hyperlipidemia     Hypertension     Myocardial infarction (HCC)     Neuropathy     Bilateral feet    Sleep apnea     Could not tolerate CPAP     Present on Admission:   Primary hypertension   Acute kidney injury superimposed on chronic kidney disease    Chronic HFpEF/Moderate pHTN (HFpEF) (HCC)   Peripheral artery disease (HCC)   Coronary artery disease involving native coronary artery of native heart without angina  pectoris   Acute kidney injury superimposed on chronic kidney disease       Admitting Diagnosis: Shortness of breath [R06.02]  Age/Sex: 78 y.o. male  Admission Orders:  Scheduled Medications:  amLODIPine, 5 mg, Oral, Daily  atorvastatin, 40 mg, Oral, QPM  fenofibrate, 145 mg, Oral, Daily  gabapentin, 600 mg, Oral, BID  insulin glargine, 8 Units, Subcutaneous, HS  insulin lispro, 1-6 Units, Subcutaneous, Q6H CONSTANTINE  isosorbide mononitrate, 60 mg, Oral, Daily  pantoprazole, 40 mg, Intravenous, Q12H CONSTANTINE  ranolazine, 500 mg, Oral, BID      Continuous IV Infusions:  sodium chloride, 75 mL/hr, Intravenous, Continuous      PRN Meds:  ondansetron, 4 mg, Intravenous, Q6H PRN        IP CONSULT TO GASTROENTEROLOGY    Network Utilization Review Department  ATTENTION: Please call with any questions or concerns to [phone] and carefully listen to the prompts so that you are directed to the right person. All voicemails are confidential.   For Discharge needs, contact Care Management DC Support Team at [phone] opt. [phone]  Send all requests for admission clinical reviews, approved or denied determinations and any other requests to dedicated fax number below belonging to the campus where the patient is receiving treatment. List of dedicated fax numbers for the Facilities:  FACILITY NAME UR FAX NUMBER   ADMISSION DENIALS (Administrative/Medical Necessity) [phone]   DISCHARGE SUPPORT TEAM (NETWORK) [phone]   PARENT CHILD HEALTH (Maternity/NICU/Pediatrics) [phone]   [facility] [phone]   [facility] [phone]   [facility] [phone]   [facility] [phone]   [facility] [phone]   [facility] [phone]   [facility] [phone]   [facility] [phone]   [facility]  [facility] [phone]   [facility] [phone]   [facility] [phone]

## 2023-12-26 NOTE — ASSESSMENT & PLAN NOTE
Lab Results   Component Value Date    EGFR 35 12/26/2023    EGFR 31 12/25/2023    EGFR 53 11/21/2023    CREATININE 1.79 (H) 12/26/2023    CREATININE 1.93 (H) 12/25/2023    CREATININE 1.26 11/21/2023     Baseline Cr 1.6-1.8 per most recent outpatient nephrology visit.   Cr above baseline on presentation.   Received 250 mL of NS in the ED.   Hold torsemide.   Avoid nephrotoxins and hypotension.   Monitor intake/output.   Repeat BMP in AM.

## 2023-12-27 ENCOUNTER — TELEPHONE (OUTPATIENT)
Dept: GASTROENTEROLOGY | Facility: AMBULARY SURGERY CENTER | Age: 78
End: 2023-12-27

## 2023-12-27 VITALS
BODY MASS INDEX: 28.32 KG/M2 | WEIGHT: 220.6 LBS | HEART RATE: 66 BPM | OXYGEN SATURATION: 100 % | TEMPERATURE: 98.2 F | RESPIRATION RATE: 16 BRPM | SYSTOLIC BLOOD PRESSURE: 135 MMHG | DIASTOLIC BLOOD PRESSURE: 61 MMHG

## 2023-12-27 PROBLEM — D50.0 IRON DEFICIENCY ANEMIA DUE TO CHRONIC BLOOD LOSS: Status: ACTIVE | Noted: 2023-12-27

## 2023-12-27 PROBLEM — K25.9 MULTIPLE GASTRIC ULCERS: Status: ACTIVE | Noted: 2023-12-27

## 2023-12-27 LAB
ANION GAP SERPL CALCULATED.3IONS-SCNC: 8 MMOL/L
BASOPHILS # BLD AUTO: 0.03 THOUSANDS/ÂΜL (ref 0–0.1)
BASOPHILS NFR BLD AUTO: 0 % (ref 0–1)
BUN SERPL-MCNC: 32 MG/DL (ref 5–25)
CALCIUM SERPL-MCNC: 9.2 MG/DL (ref 8.4–10.2)
CHLORIDE SERPL-SCNC: 105 MMOL/L (ref 96–108)
CO2 SERPL-SCNC: 24 MMOL/L (ref 21–32)
CREAT SERPL-MCNC: 1.58 MG/DL (ref 0.6–1.3)
EOSINOPHIL # BLD AUTO: 0.21 THOUSAND/ÂΜL (ref 0–0.61)
EOSINOPHIL NFR BLD AUTO: 3 % (ref 0–6)
ERYTHROCYTE [DISTWIDTH] IN BLOOD BY AUTOMATED COUNT: 14.2 % (ref 11.6–15.1)
GFR SERPL CREATININE-BSD FRML MDRD: 40 ML/MIN/1.73SQ M
GLUCOSE SERPL-MCNC: 116 MG/DL (ref 65–140)
GLUCOSE SERPL-MCNC: 119 MG/DL (ref 65–140)
GLUCOSE SERPL-MCNC: 131 MG/DL (ref 65–140)
GLUCOSE SERPL-MCNC: 212 MG/DL (ref 65–140)
HCT VFR BLD AUTO: 27.7 % (ref 36.5–49.3)
HGB BLD-MCNC: 8.1 G/DL (ref 12–17)
IMM GRANULOCYTES # BLD AUTO: 0.05 THOUSAND/UL (ref 0–0.2)
IMM GRANULOCYTES NFR BLD AUTO: 1 % (ref 0–2)
LYMPHOCYTES # BLD AUTO: 1.59 THOUSANDS/ÂΜL (ref 0.6–4.47)
LYMPHOCYTES NFR BLD AUTO: 20 % (ref 14–44)
MCH RBC QN AUTO: 25.5 PG (ref 26.8–34.3)
MCHC RBC AUTO-ENTMCNC: 29.2 G/DL (ref 31.4–37.4)
MCV RBC AUTO: 87 FL (ref 82–98)
MONOCYTES # BLD AUTO: 0.58 THOUSAND/ÂΜL (ref 0.17–1.22)
MONOCYTES NFR BLD AUTO: 7 % (ref 4–12)
NEUTROPHILS # BLD AUTO: 5.63 THOUSANDS/ÂΜL (ref 1.85–7.62)
NEUTS SEG NFR BLD AUTO: 69 % (ref 43–75)
NRBC BLD AUTO-RTO: 0 /100 WBCS
PLATELET # BLD AUTO: 282 THOUSANDS/UL (ref 149–390)
PMV BLD AUTO: 10.7 FL (ref 8.9–12.7)
POTASSIUM SERPL-SCNC: 4.2 MMOL/L (ref 3.5–5.3)
RBC # BLD AUTO: 3.18 MILLION/UL (ref 3.88–5.62)
SODIUM SERPL-SCNC: 137 MMOL/L (ref 135–147)
WBC # BLD AUTO: 8.09 THOUSAND/UL (ref 4.31–10.16)

## 2023-12-27 PROCEDURE — 99232 SBSQ HOSP IP/OBS MODERATE 35: CPT | Performed by: INTERNAL MEDICINE

## 2023-12-27 PROCEDURE — 99239 HOSP IP/OBS DSCHRG MGMT >30: CPT | Performed by: STUDENT IN AN ORGANIZED HEALTH CARE EDUCATION/TRAINING PROGRAM

## 2023-12-27 PROCEDURE — 85025 COMPLETE CBC W/AUTO DIFF WBC: CPT | Performed by: STUDENT IN AN ORGANIZED HEALTH CARE EDUCATION/TRAINING PROGRAM

## 2023-12-27 PROCEDURE — 80048 BASIC METABOLIC PNL TOTAL CA: CPT | Performed by: STUDENT IN AN ORGANIZED HEALTH CARE EDUCATION/TRAINING PROGRAM

## 2023-12-27 PROCEDURE — C9113 INJ PANTOPRAZOLE SODIUM, VIA: HCPCS | Performed by: INTERNAL MEDICINE

## 2023-12-27 PROCEDURE — 82948 REAGENT STRIP/BLOOD GLUCOSE: CPT

## 2023-12-27 RX ORDER — PANTOPRAZOLE SODIUM 40 MG/1
40 TABLET, DELAYED RELEASE ORAL 2 TIMES DAILY
Qty: 180 TABLET | Refills: 0 | Status: SHIPPED | OUTPATIENT
Start: 2023-12-27 | End: 2024-03-26

## 2023-12-27 RX ORDER — FERROUS SULFATE 324(65)MG
324 TABLET, DELAYED RELEASE (ENTERIC COATED) ORAL EVERY OTHER DAY
Qty: 45 TABLET | Refills: 0 | Status: SHIPPED | OUTPATIENT
Start: 2023-12-27 | End: 2024-03-26

## 2023-12-27 RX ORDER — POLYETHYLENE GLYCOL 3350 17 G/17G
17 POWDER, FOR SOLUTION ORAL DAILY
Status: DISCONTINUED | OUTPATIENT
Start: 2023-12-27 | End: 2023-12-27 | Stop reason: HOSPADM

## 2023-12-27 RX ADMIN — PANTOPRAZOLE SODIUM 40 MG: 40 INJECTION, POWDER, FOR SOLUTION INTRAVENOUS at 08:27

## 2023-12-27 RX ADMIN — IRON SUCROSE 200 MG: 20 INJECTION, SOLUTION INTRAVENOUS at 12:23

## 2023-12-27 RX ADMIN — ASPIRIN 81 MG CHEWABLE TABLET 81 MG: 81 TABLET CHEWABLE at 08:27

## 2023-12-27 RX ADMIN — FENOFIBRATE 145 MG: 145 TABLET ORAL at 08:27

## 2023-12-27 RX ADMIN — AMLODIPINE BESYLATE 5 MG: 5 TABLET ORAL at 08:27

## 2023-12-27 RX ADMIN — GABAPENTIN 600 MG: 300 CAPSULE ORAL at 08:27

## 2023-12-27 RX ADMIN — RANOLAZINE 500 MG: 500 TABLET, FILM COATED, EXTENDED RELEASE ORAL at 08:27

## 2023-12-27 RX ADMIN — POLYETHYLENE GLYCOL 3350 17 G: 17 POWDER, FOR SOLUTION ORAL at 11:11

## 2023-12-27 RX ADMIN — INSULIN LISPRO 2 UNITS: 100 INJECTION, SOLUTION INTRAVENOUS; SUBCUTANEOUS at 11:15

## 2023-12-27 RX ADMIN — ISOSORBIDE MONONITRATE 60 MG: 60 TABLET, EXTENDED RELEASE ORAL at 08:27

## 2023-12-27 NOTE — PROGRESS NOTES
Progress Note - Thomas Horne 78 y.o. male MRN: [id number]    Unit/Bed#: S -01 Encounter: [id number]    Assessment and Plan:   Principal Problem:    Symptomatic anemia  Active Problems:    Primary hypertension    Coronary artery disease involving native coronary artery of native heart without angina pectoris    Type 2 diabetes mellitus with diabetic polyneuropathy, with long-term current use of insulin (HCC)    Peripheral artery disease (HCC)    Chronic HFpEF/Moderate pHTN (HFpEF) (HCC)    Acute kidney injury superimposed on chronic kidney disease     Melena    Multiple gastric ulcers    Iron deficiency anemia due to chronic blood loss    #1. Melena and anemia secondary to PUD in setting of antiplatelets: hgb stable and improving, s/p EGD yesterday with clean based duodenal ulcers.   -f/u bx for h pylori  -BID PPI  -nonulcerogenic diet  -can resume antiplatelets/anticoagulation and ASA  -avoid NSAIDs  -outpatient follow up   -GI will sign off   ----------------------------------------------------------------------------------------------------------------    Subjective:     No BM, no abdominal pain, tolerating diet     Objective:     Vitals: Blood pressure 135/61, pulse 66, temperature 98.2 °F (36.8 °C), resp. rate 16, weight 100 kg (220 lb 9.6 oz), SpO2 100%.,Body mass index is 28.32 kg/m².      Intake/Output Summary (Last 24 hours) at 12/27/2023 1309  Last data filed at 12/27/2023 1250  Gross per 24 hour   Intake 1140 ml   Output 0 ml   Net 1140 ml       Physical Exam:     General Appearance: Alert, appears stated age and cooperative  Lungs: Clear to auscultation bilaterally, no rales or rhonchi, no labored breathing/accessory muscle use  Heart: Regular rate and rhythm, S1, S2 normal, no murmur, click, rub or gallop  Abdomen: Soft, non-tender, non-distended; bowel sounds normal; no masses or no organomegaly  Extremities: No cyanosis, clubbing, or edema    Invasive Devices       Peripheral Intravenous Line   "Duration             Peripheral IV 12/27/23 Dorsal (posterior);Right Forearm <1 day                    Lab Results:  Results from last 7 days   Lab Units 12/27/23  0545   WBC Thousand/uL 8.09   HEMOGLOBIN g/dL 8.1*   HEMATOCRIT % 27.7*   PLATELETS Thousands/uL 282   NEUTROS PCT % 69   LYMPHS PCT % 20   MONOS PCT % 7   EOS PCT % 3     Results from last 7 days   Lab Units 12/27/23  0545 12/26/23  0451 12/25/23  1844   POTASSIUM mmol/L 4.2   < > 4.8   CHLORIDE mmol/L 105   < > 101   CO2 mmol/L 24   < > 26   BUN mg/dL 32*   < > 47*   CREATININE mg/dL 1.58*   < > 1.93*   CALCIUM mg/dL 9.2   < > 9.4   ALK PHOS U/L  --   --  37   ALT U/L  --   --  12   AST U/L  --   --  15    < > = values in this interval not displayed.     Invalid input(s): \"BILI\"            Imaging Studies: I have personally reviewed pertinent imaging studies.    EGD    Result Date: 12/26/2023  Impression: Multiple small clean-based shallow superficial duodenal ulcers in the bulb Normal sigmoid and the duodenum Gastritis lasting for H. pylori Normal retroflexion Normal esophagus and GE junction In the setting antiplatelet therapy ulcers and blood could be the source of anemia RECOMMENDATION:  Await pathology results Return to floor Okay to continue dual antiplatelet therapy Okay to resume antiplatelet therapy Follow-up biopsy results Final hemoglobin, transfuse as clinically indicated -No indication for colonoscopy at this time   Fran Terrazas MD     XR chest 1 view portable    Result Date: 12/26/2023  Impression: Mild pulmonary venous congestion. Workstation performed: [id number]                     "

## 2023-12-27 NOTE — DISCHARGE INSTR - AVS FIRST PAGE
"Patient is cleared per GI to restart dual antiplatelets aspirin and Plavix given no evidence of active bleeding however there are ulcers in the stomach and GI blood loss is not ruled out    Patient to repeat CBC and BMP after discharge as instructed in case of dropping hemoglobin or continue to have melena/dark stool, drop in the blood pressure dizziness lightheadedness or concerning symptoms of ongoing bleeding patient to home antiplatelets if so    Please contact your vascular surgery and cardiology providers to discuss the duration recommended/needed of the dual antiplatelet versus monotherapy antiplatelet    Needs pantoprazole twice daily, prescription sent and he will receive it okay to take omeprazole twice daily and he stated he has some leftover at home    Iron deficiency anemia conferment during this admission now will be given to units of IV Venofer 200 mg before discharge and will be discharged on p.o. ferrous sulfate every other day do not/to decrease constipation risk    Patient has MiraLAX as needed at home for constipation and also advised her to \"use over-the-counter fibers  "

## 2023-12-27 NOTE — ASSESSMENT & PLAN NOTE
S/p right common femoral to peroneal artery bypass on 11/16/23  Hold aspirin & Statin . Continue statin  Outpatient follow-up with vascular surgery.   s/p R fem-pop bypass w/ R femoral endarterectomy performed by Dr. Clark on 11/16/23 ; currently his DAPT on hold due to suspected GI bleed ; okay per GI to restart   To f/u outpatient with vascular and cardiology

## 2023-12-27 NOTE — DISCHARGE SUMMARY
"[facility]  Discharge- Thomas Horne 1945, 78 y.o. male MRN: [id number]  Unit/Bed#: S -01 Encounter: [id number]  Primary Care Provider: Frank Lombardi, DO   Date and time admitted to hospital: 12/25/2023  5:51 PM    * Symptomatic anemia  Assessment & Plan  In the setting of melena/ suspected upper GI bleed.   Presented with SOB, fatigue, generalized weakness and melena.   Hg 6.9 on presentation with baseline Hgb 12-14 prior to his vascular procedure last month.   Received 1 unit of PRBC thus far with good response , Hgb 7.5 on 12/26 .     GI consult; EGD 12/27  \" Multiple small clean-based shallow superficial duodenal ulcers in the bulb.. In the setting antiplatelet therapy ulcers and blood could be the source of anemia\" with GI okay to restart DAPT with BID PPI and outpatient follow up    Checking Retic ct, folate , B12 and iron panel ; consistent with iron deficiency will start IV Venofer 200 mg X 3 doses / week   Trend H&H      Iron deficiency anemia due to chronic blood loss  Assessment & Plan  See symptomatic anemia A&P   PPI BID , Iron supplementation and close follow up with PCP and GI     Multiple gastric ulcers  Assessment & Plan  See symptomatic anemia A&P     Acute kidney injury superimposed on chronic kidney disease   Assessment & Plan  Lab Results   Component Value Date    EGFR 40 12/27/2023    EGFR 35 12/26/2023    EGFR 31 12/25/2023    CREATININE 1.58 (H) 12/27/2023    CREATININE 1.79 (H) 12/26/2023    CREATININE 1.93 (H) 12/25/2023     Baseline Cr 1.6-1.8 per most recent outpatient nephrology visit.   Cr above baseline on presentation.   Received 250 mL of NS in the ED.   Hold torsemide.   Avoid nephrotoxins and hypotension.   Monitor intake/output.   Repeat BMP in AM.     Suspected pre-renal / vascular depletion ; relatively improved post IVF bolus in the ED ; continue IV hydration while NPO, avoid nephrotoxic agents and trend   Cr continue to trend down / " improving     Chronic HFpEF/Moderate pHTN (HFpEF) ([facility])  Assessment & Plan  Wt Readings from Last 3 Encounters:   12/25/23 104 kg (229 lb 4.5 oz)   12/21/23 103 kg (228 lb)   12/21/23 101 kg (223 lb)       Appears euvolemic.   Echo 6/2023: EF 50%, G2DD, mild to moderate AS.  Hold torsemide given elevated Cr.   Monitor daily weights and intake/output.         Acute kidney injury superimposed on chronic kidney disease   Assessment & Plan  Lab Results   Component Value Date    EGFR 35 12/26/2023    EGFR 31 12/25/2023    EGFR 53 11/21/2023    CREATININE 1.79 (H) 12/26/2023    CREATININE 1.93 (H) 12/25/2023    CREATININE 1.26 11/21/2023     Baseline Cr 1.6-1.8 per most recent outpatient nephrology visit.   Cr above baseline on presentation.   Received 250 mL of NS in the ED.   Hold torsemide.   Avoid nephrotoxins and hypotension.   Monitor intake/output.   Repeat BMP in AM.     Peripheral artery disease ([facility])  Assessment & Plan  S/p right common femoral to peroneal artery bypass on 11/16/23  Hold aspirin & Statin . Continue statin  Outpatient follow-up with vascular surgery.   s/p R fem-pop bypass w/ R femoral endarterectomy performed by Dr. Clark on 11/16/23 ; currently his DAPT on hold due to suspected GI bleed ; okay per GI to restart   To f/u outpatient with vascular and cardiology     Type 2 diabetes mellitus with diabetic polyneuropathy, with long-term current use of insulin ([facility])  Assessment & Plan  Lab Results   Component Value Date    HGBA1C 7.0 (H) 10/04/2023       Recent Labs     12/26/23  2021 12/27/23  0036 12/27/23  0754 12/27/23  1108   POCGLU 211* 131 119 212*         Blood Sugar Average: Last 72 hrs:  (P) [id number]  Hold PO medications while inpatient.   Monitor accu-checks q6h  Decreased home Lantus 15 to 8 units HS while NPO   SSI coverage.   Hypoglycemia protocol.      Coronary artery disease involving native coronary artery of native heart without angina pectoris  Assessment &  "Plan  CAD s/p CABG x 3 in 2003.  Admits to chest discomfort recently when he becomes very SOB.   Troponin 21, 21.   EKG without ischemic changes. Monitor on telemetry.   Hold ASA and Plavix given melena. Per GI okay to restart DAPT on discharge  Continue Imdur and Ranexa.     Primary hypertension  Assessment & Plan  BP acceptable   Continue amlodipine with hold parameters for SBP < 130.   Hold evening doses of metoprolol and clonidine given HR in the 40-50s.  Resume in AM if bradycardia has improved.           Medical Problems       Resolved Problems  Date Reviewed: 12/27/2023   None       Discharging Physician / Practitioner: Lowell Giron DO  PCP: Frank Lombardi, DO  Admission Date:   Admission Orders (From admission, onward)       Ordered        12/25/23 1945  INPATIENT ADMISSION  Once                          Discharge Date: 12/27/23    Consultations During Hospital Stay:  GI    Procedures Performed:   EGD & IV Iron    Significant Findings / Test Results:   Peptic ulcers , Iron deficiency anemia     Incidental Findings:         Test Results Pending at Discharge (will require follow up):   EGD Path     Outpatient Tests Requested:  Repeate CBC and BMP    Complications:  none    Reason for Admission: Melena and symptomatic anemia     Hospital Course:   Thomas Horne is a 78 y.o. male patient who originally presented to the hospital on 12/25/2023 due to melena and symptomatic anemia , while on DAPT , held on admit, EGD with peptic ulcers but clear bases/ no stigmata; GI okay to restart DAPT given medical indication as above, with PPI BID and Iron supplementations.      12/27 seen and examined sitting in bed in no distress, ROS - , constipation 2 days , taking miralax and will take fiber supplement , no N/V/abd pain.     DC with following instructions provided in verbal and print:       \"Patient is cleared per GI to restart dual antiplatelets aspirin and Plavix given no evidence of active bleeding however there are " "ulcers in the stomach and GI blood loss is not ruled out    Patient to repeat CBC and BMP after discharge as instructed in case of dropping hemoglobin or continue to have melena/dark stool, drop in the blood pressure dizziness lightheadedness or concerning symptoms of ongoing bleeding patient to home antiplatelets if so    Please contact your vascular surgery and cardiology providers to discuss the duration recommended/needed of the dual antiplatelet versus monotherapy antiplatelet    Needs pantoprazole twice daily, prescription sent and he will receive it okay to take omeprazole twice daily and he stated he has some leftover at home    Iron deficiency anemia conferment during this admission now will be given to units of IV Venofer 200 mg before discharge and will be discharged on p.o. ferrous sulfate every other day do not/to decrease constipation risk    Patient has MiraLAX as needed at home for constipation and also advised her to \"use over-the-counter fibers\"          Please see above list of diagnoses and related plan for additional information.     Condition at Discharge: good    Discharge Day Visit / Exam:   Subjective:  see above  Vitals: Blood Pressure: 135/61 (12/27/23 0751)  Pulse: 66 (12/27/23 0751)  Temperature: 98.2 °F (36.8 °C) (12/27/23 0751)  Temp Source: Oral (12/26/23 2325)  Respirations: 16 (12/27/23 0751)  Weight - Scale: 100 kg (220 lb 9.6 oz) (12/27/23 0544)  SpO2: 100 % (12/27/23 0751)  Exam:   Physical Exam   - GEN: Appears well, alert and oriented x 3, pleasant and cooperative, in no acute distress  - HEENT: Anicteric, mucous membranes moist, PERRL and EOMI   - NECK: No lymphadenopathy, JVD or carotid bruits   - HEART: RRR, normal S1 and S2, no murmurs, clicks, gallops or rubs   - LUNGS: Clear to auscultation bilaterally; no wheezes, rales, or rhonchi  - ABDOMEN: Normal bowel sounds, soft, no tenderness, no distention, no organomegaly or masses felt on exam.   - EXTREMITIES: Peripheral " pulses normal; no clubbing, cyanosis, or edema  - NEURO: No focal findings, CN II-XII are grossly intact.   - Musculoskeletal: 5/5 strength, normal ROM, no swollen or erythematous joints.   - SKIN: Normal without suspicious lesions on exposed skin      Discussion with Family: Patient declined call to .     Discharge instructions/Information to patient and family:   See after visit summary for information provided to patient and family.      Provisions for Follow-Up Care:  See after visit summary for information related to follow-up care and any pertinent home health orders.      Mobility at time of Discharge:   JH-HLM Achieved: 7: Walk 25 feet or more  HLM Goal achieved. Continue to encourage appropriate mobility.     Disposition:   Home    Planned Readmission: no     Discharge Statement:  I spent 50 minutes discharging the patient. This time was spent on the day of discharge. I had direct contact with the patient on the day of discharge. Greater than 50% of the total time was spent examining patient, answering all patient questions, arranging and discussing plan of care with patient as well as directly providing post-discharge instructions.  Additional time then spent on discharge activities.    Discharge Medications:  See after visit summary for reconciled discharge medications provided to patient and/or family.      **Please Note: This note may have been constructed using a voice recognition system**

## 2023-12-27 NOTE — ASSESSMENT & PLAN NOTE
Lab Results   Component Value Date    HGBA1C 7.0 (H) 10/04/2023       Recent Labs     12/26/23 2021 12/27/23  0036 12/27/23  0754 12/27/23  1108   POCGLU 211* 131 119 212*         Blood Sugar Average: Last 72 hrs:  (P) [id number]  Hold PO medications while inpatient.   Monitor accu-checks q6h  Decreased home Lantus 15 to 8 units HS while NPO   SSI coverage.   Hypoglycemia protocol.

## 2023-12-28 ENCOUNTER — TRANSITIONAL CARE MANAGEMENT (OUTPATIENT)
Dept: FAMILY MEDICINE CLINIC | Facility: CLINIC | Age: 78
End: 2023-12-28

## 2023-12-28 ENCOUNTER — TELEPHONE (OUTPATIENT)
Dept: FAMILY MEDICINE CLINIC | Facility: CLINIC | Age: 78
End: 2023-12-28

## 2023-12-28 ENCOUNTER — TELEPHONE (OUTPATIENT)
Age: 78
End: 2023-12-28

## 2023-12-28 PROCEDURE — 88305 TISSUE EXAM BY PATHOLOGIST: CPT | Performed by: PATHOLOGY

## 2023-12-28 NOTE — TELEPHONE ENCOUNTER
Pt  currently takes Asprin and Plavix and is wondering if he can just take one or the other  as he thinks this is what lead to his stomach bleeding. please advise     Sultana Austin, CMA

## 2023-12-28 NOTE — TELEPHONE ENCOUNTER
Patient is returning a call from ellie from the office to schedule his TCM appointment.  Please call patient back    Unable to transfer to clinical team at this time

## 2023-12-29 ENCOUNTER — APPOINTMENT (OUTPATIENT)
Dept: LAB | Facility: CLINIC | Age: 78
End: 2023-12-29
Payer: COMMERCIAL

## 2023-12-29 ENCOUNTER — TELEPHONE (OUTPATIENT)
Age: 78
End: 2023-12-29

## 2023-12-29 DIAGNOSIS — K92.1 MELENA: ICD-10-CM

## 2023-12-29 DIAGNOSIS — D50.0 IRON DEFICIENCY ANEMIA DUE TO CHRONIC BLOOD LOSS: ICD-10-CM

## 2023-12-29 DIAGNOSIS — D64.9 SYMPTOMATIC ANEMIA: ICD-10-CM

## 2023-12-29 DIAGNOSIS — K92.2 GI BLEED: ICD-10-CM

## 2023-12-29 LAB
BASOPHILS # BLD AUTO: 0.03 THOUSANDS/ÂΜL (ref 0–0.1)
BASOPHILS NFR BLD AUTO: 0 % (ref 0–1)
EOSINOPHIL # BLD AUTO: 0.14 THOUSAND/ÂΜL (ref 0–0.61)
EOSINOPHIL NFR BLD AUTO: 2 % (ref 0–6)
ERYTHROCYTE [DISTWIDTH] IN BLOOD BY AUTOMATED COUNT: 14.7 % (ref 11.6–15.1)
HCT VFR BLD AUTO: 31 % (ref 36.5–49.3)
HGB BLD-MCNC: 9 G/DL (ref 12–17)
IMM GRANULOCYTES # BLD AUTO: 0.06 THOUSAND/UL (ref 0–0.2)
IMM GRANULOCYTES NFR BLD AUTO: 1 % (ref 0–2)
LYMPHOCYTES # BLD AUTO: 1.62 THOUSANDS/ÂΜL (ref 0.6–4.47)
LYMPHOCYTES NFR BLD AUTO: 18 % (ref 14–44)
MCH RBC QN AUTO: 26.2 PG (ref 26.8–34.3)
MCHC RBC AUTO-ENTMCNC: 29 G/DL (ref 31.4–37.4)
MCV RBC AUTO: 90 FL (ref 82–98)
MONOCYTES # BLD AUTO: 0.56 THOUSAND/ÂΜL (ref 0.17–1.22)
MONOCYTES NFR BLD AUTO: 6 % (ref 4–12)
NEUTROPHILS # BLD AUTO: 6.58 THOUSANDS/ÂΜL (ref 1.85–7.62)
NEUTS SEG NFR BLD AUTO: 73 % (ref 43–75)
NRBC BLD AUTO-RTO: 0 /100 WBCS
PLATELET # BLD AUTO: 348 THOUSANDS/UL (ref 149–390)
PMV BLD AUTO: 10.9 FL (ref 8.9–12.7)
RBC # BLD AUTO: 3.44 MILLION/UL (ref 3.88–5.62)
WBC # BLD AUTO: 8.99 THOUSAND/UL (ref 4.31–10.16)

## 2023-12-29 PROCEDURE — 85025 COMPLETE CBC W/AUTO DIFF WBC: CPT

## 2023-12-29 PROCEDURE — 36415 COLL VENOUS BLD VENIPUNCTURE: CPT

## 2023-12-29 NOTE — TELEPHONE ENCOUNTER
Tessa from [facility] called with an FYI. The pt is requesting d/c from their home services. No further action needed.

## 2024-01-02 ENCOUNTER — APPOINTMENT (OUTPATIENT)
Dept: LAB | Facility: CLINIC | Age: 79
End: 2024-01-02
Payer: COMMERCIAL

## 2024-01-02 ENCOUNTER — OFFICE VISIT (OUTPATIENT)
Dept: FAMILY MEDICINE CLINIC | Facility: CLINIC | Age: 79
End: 2024-01-02
Payer: COMMERCIAL

## 2024-01-02 VITALS
HEIGHT: 74 IN | WEIGHT: 227.8 LBS | BODY MASS INDEX: 29.24 KG/M2 | DIASTOLIC BLOOD PRESSURE: 64 MMHG | TEMPERATURE: 97 F | SYSTOLIC BLOOD PRESSURE: 128 MMHG | RESPIRATION RATE: 18 BRPM | HEART RATE: 57 BPM

## 2024-01-02 DIAGNOSIS — K25.9 MULTIPLE GASTRIC ULCERS: Primary | ICD-10-CM

## 2024-01-02 DIAGNOSIS — I50.32 CHRONIC DIASTOLIC (CONGESTIVE) HEART FAILURE (HCC): ICD-10-CM

## 2024-01-02 DIAGNOSIS — K92.2 GI BLEED: ICD-10-CM

## 2024-01-02 DIAGNOSIS — D50.0 IRON DEFICIENCY ANEMIA DUE TO CHRONIC BLOOD LOSS: ICD-10-CM

## 2024-01-02 DIAGNOSIS — L03.90 CELLULITIS, UNSPECIFIED CELLULITIS SITE: ICD-10-CM

## 2024-01-02 DIAGNOSIS — D69.6 PLATELETS DECREASED (HCC): ICD-10-CM

## 2024-01-02 DIAGNOSIS — I50.33 ACUTE ON CHRONIC DIASTOLIC HEART FAILURE (HCC): ICD-10-CM

## 2024-01-02 DIAGNOSIS — D64.9 SYMPTOMATIC ANEMIA: ICD-10-CM

## 2024-01-02 DIAGNOSIS — I10 PRIMARY HYPERTENSION: ICD-10-CM

## 2024-01-02 DIAGNOSIS — N17.9 ACUTE KIDNEY INJURY SUPERIMPOSED ON CHRONIC KIDNEY DISEASE: ICD-10-CM

## 2024-01-02 DIAGNOSIS — E11.42 TYPE 2 DIABETES MELLITUS WITH DIABETIC POLYNEUROPATHY, UNSPECIFIED WHETHER LONG TERM INSULIN USE (HCC): ICD-10-CM

## 2024-01-02 DIAGNOSIS — Z95.820 S/P ANGIOPLASTY WITH STENT: ICD-10-CM

## 2024-01-02 DIAGNOSIS — K92.1 MELENA: ICD-10-CM

## 2024-01-02 DIAGNOSIS — N18.9 ACUTE KIDNEY INJURY SUPERIMPOSED ON CHRONIC KIDNEY DISEASE: ICD-10-CM

## 2024-01-02 DIAGNOSIS — I73.9 PERIPHERAL VASCULAR DISEASE, UNSPECIFIED (HCC): ICD-10-CM

## 2024-01-02 LAB
ANION GAP SERPL CALCULATED.3IONS-SCNC: 13 MMOL/L
BUN SERPL-MCNC: 31 MG/DL (ref 5–25)
CALCIUM SERPL-MCNC: 9.8 MG/DL (ref 8.4–10.2)
CHLORIDE SERPL-SCNC: 104 MMOL/L (ref 96–108)
CO2 SERPL-SCNC: 25 MMOL/L (ref 21–32)
CREAT SERPL-MCNC: 1.6 MG/DL (ref 0.6–1.3)
GFR SERPL CREATININE-BSD FRML MDRD: 40 ML/MIN/1.73SQ M
GLUCOSE P FAST SERPL-MCNC: 160 MG/DL (ref 65–99)
POTASSIUM SERPL-SCNC: 4.1 MMOL/L (ref 3.5–5.3)
SODIUM SERPL-SCNC: 142 MMOL/L (ref 135–147)

## 2024-01-02 PROCEDURE — 80048 BASIC METABOLIC PNL TOTAL CA: CPT

## 2024-01-02 PROCEDURE — 99496 TRANSJ CARE MGMT HIGH F2F 7D: CPT | Performed by: FAMILY MEDICINE

## 2024-01-02 PROCEDURE — 36415 COLL VENOUS BLD VENIPUNCTURE: CPT

## 2024-01-02 RX ORDER — CEPHALEXIN 500 MG/1
500 CAPSULE ORAL EVERY 12 HOURS SCHEDULED
Qty: 20 CAPSULE | Refills: 0 | Status: SHIPPED | OUTPATIENT
Start: 2024-01-02 | End: 2024-01-12

## 2024-01-02 NOTE — PROGRESS NOTES
Assessment/Plan:    1. Multiple gastric ulcers    2. Primary hypertension    3. S/P angioplasty with stent    4. Acute kidney injury superimposed on chronic kidney disease   -     Comprehensive metabolic panel; Future; Expected date: 01/02/2024    5. Cellulitis, unspecified cellulitis site  -     cephalexin (KEFLEX) 500 mg capsule; Take 1 capsule (500 mg total) by mouth every 12 (twelve) hours for 10 days    6. Iron deficiency anemia due to chronic blood loss    7. Acute on chronic diastolic heart failure (HCC)    8. Chronic diastolic (congestive) heart failure (HCC)    9. Type 2 diabetes mellitus with diabetic polyneuropathy, unspecified whether long term insulin use ([facility])    10. Peripheral vascular disease, unspecified ([facility])    11. Platelets decreased ([facility])    Pt advised to have follow up with vascular and cardio.  He is not falling.  Pt is cocnerned he is on to many blood thinners.  I do not see him coming off his plavix and asa - shayna if the hemoglobin is stable.  Will see what the specialists think on this matter.  No blood in stool etc.  Pt is feeling better        There are no Patient Instructions on file for this visit.    No follow-ups on file.    Subjective:      Patient ID: Thomas Horne is a 78 y.o. male.    Chief Complaint   Patient presents with   • Transition of Care Management     Hospital f/u Symptomatic anemia Dayami Maravilla LPN         Pt ios sched for a TCM appt  Nurses TCM note appreciated    Pt states xmas day - he was veruy winded - had chest pain  Pt seen in emergency room - pt dx with symptomatic anemia  Pt did recently have leg surgery and he was on blood thinners - pt jass was scoped and he was found to have ulcers - and he was started on a PPI    Pt was given 1 unit of blood  Pt also given iron        The following portions of the patient's history were reviewed and updated as appropriate: allergies, current medications, past family history, past medical history, past social history,  past surgical history and problem list.    Review of Systems   Constitutional:  Negative for activity change, appetite change, chills, diaphoresis, fatigue, fever and unexpected weight change.   HENT:  Negative for congestion, dental problem, ear pain, mouth sores, sinus pressure, sinus pain, sore throat and trouble swallowing.    Eyes:  Negative for photophobia, discharge and itching.   Respiratory:  Negative for apnea, chest tightness and shortness of breath.    Cardiovascular:  Negative for chest pain, palpitations and leg swelling.   Gastrointestinal:  Negative for abdominal distention, abdominal pain, blood in stool, nausea and vomiting.   Endocrine: Negative for cold intolerance, heat intolerance, polydipsia, polyphagia and polyuria.   Genitourinary:  Negative for difficulty urinating.   Musculoskeletal:  Negative for arthralgias.   Skin:  Negative for color change and wound.   Neurological:  Negative for dizziness, syncope, speech difficulty and headaches.   Hematological:  Negative for adenopathy.   Psychiatric/Behavioral:  Negative for agitation and behavioral problems.          Current Outpatient Medications   Medication Sig Dispense Refill   • acetaminophen (TYLENOL) 325 mg tablet Take 2 tablets (650 mg total) by mouth every 6 (six) hours as needed for mild pain  0   • amLODIPine (NORVASC) 10 mg tablet Take 0.5 tablets (5 mg total) by mouth daily 1 tablet 1   • aspirin 81 mg chewable tablet Chew 81 mg daily     • atorvastatin (LIPITOR) 40 mg tablet Take 1 tablet (40 mg total) by mouth daily (Patient taking differently: Take 40 mg by mouth every evening) 90 tablet 3   • Blood Glucose Monitoring Suppl (OneTouch Verio Reflect) w/Device KIT Check blood sugars twice daily. Please substitute with appropriate alternative as covered by patient's insurance. Dx: E11.65 1 kit 0   • cephalexin (KEFLEX) 500 mg capsule Take 1 capsule (500 mg total) by mouth every 12 (twelve) hours for 10 days 20 capsule 0   • cloNIDine  (CATAPRES) 0.1 mg tablet take 1 tablet by mouth every 12 hours 180 tablet 1   • clopidogrel (PLAVIX) 75 mg tablet TAKE 1 TABLET BY MOUTH DAILY 90 tablet 0   • Droplet Pen Needles 32G X 4 MM MISC USE EVERY EVENING 100 each 5   • Elastic Bandages & Supports (Medical Compression Stockings) MISC Use daily Knee High 15-20mmHg 2 each 4   • Empagliflozin 25 MG TABS Take 1 tablet (25 mg total) by mouth daily 90 tablet 1   • fenofibrate 160 MG tablet Take 1 tablet (160 mg total) by mouth daily 90 tablet 3   • ferrous sulfate 324 (65 Fe) mg Take 1 tablet (324 mg total) by mouth every other day 45 tablet 0   • gabapentin (NEURONTIN) 600 MG tablet TAKE 1 TABLET BY MOUTH  TWICE DAILY 180 tablet 3   • glucose blood (OneTouch Verio) test strip TEST TWICE A  strip 5   • insulin glargine (LANTUS) 100 units/mL subcutaneous injection Inject 15 Units under the skin daily at bedtime 10 mL 0   • isosorbide mononitrate (IMDUR) 60 mg 24 hr tablet Take 1 tablet (60 mg total) by mouth daily 90 tablet 3   • metoprolol tartrate (LOPRESSOR) 50 mg tablet Take 1 tablet (50 mg total) by mouth every 12 (twelve) hours 180 tablet 3   • Multiple Vitamins-Minerals (MULTIVITAMIN MEN 50+ PO) Take by mouth daily     • nitroglycerin (NITROSTAT) 0.4 mg SL tablet Place 1 tablet (0.4 mg total) under the tongue every 5 (five) minutes as needed for chest pain 30 tablet 3   • Omega-3 Fatty Acids (fish oil) 1,000 mg Take 4,000 mg by mouth 2 (two) times a day     • ondansetron (ZOFRAN) 4 mg tablet Take 4 mg by mouth every 8 (eight) hours as needed for nausea or vomiting     • OneTouch Delica Lancets 33G MISC Check blood sugars twice daily. Please substitute with appropriate alternative as covered by patient's insurance. Dx: E11.65 200 each 3   • pantoprazole (PROTONIX) 40 mg tablet Take 1 tablet (40 mg total) by mouth 2 (two) times a day 180 tablet 0   • ranolazine (RANEXA) 500 mg 12 hr tablet take 1 tablet by mouth twice a day 60 tablet 3   • sitaGLIPtin  "(JANUVIA) 100 mg tablet Take 1 tablet (100 mg total) by mouth daily 90 tablet 1   • torsemide (DEMADEX) 20 mg tablet Take 1 tablet (20 mg total) by mouth daily 90 tablet 3     No current facility-administered medications for this visit.       Objective:    /64   Pulse 57   Temp (!) 97 °F (36.1 °C)   Resp 18   Ht 6' 2\" (1.88 m)   Wt 103 kg (227 lb 12.8 oz)   BMI 29.25 kg/m²        Physical Exam  Vitals and nursing note reviewed.   Constitutional:       General: He is not in acute distress.     Appearance: He is well-developed. He is not diaphoretic.   HENT:      Head: Normocephalic and atraumatic.      Right Ear: External ear normal.      Left Ear: External ear normal.      Nose: Nose normal.      Mouth/Throat:      Pharynx: No oropharyngeal exudate.   Eyes:      General: No scleral icterus.        Right eye: No discharge.         Left eye: No discharge.      Pupils: Pupils are equal, round, and reactive to light.   Neck:      Thyroid: No thyromegaly.   Cardiovascular:      Rate and Rhythm: Normal rate.      Heart sounds: Normal heart sounds. No murmur heard.  Pulmonary:      Effort: Pulmonary effort is normal. No respiratory distress.      Breath sounds: Normal breath sounds. No wheezing.   Abdominal:      General: Bowel sounds are normal. There is no distension.      Palpations: Abdomen is soft. There is no mass.      Tenderness: There is no abdominal tenderness. There is no guarding or rebound.   Musculoskeletal:         General: Normal range of motion.   Skin:     General: Skin is warm and dry.      Findings: Erythema present. No rash.      Comments: Warm red indurated area rt forearm - pt states was his IV site   Neurological:      Mental Status: He is alert.      Coordination: Coordination normal.      Deep Tendon Reflexes: Reflexes normal.   Psychiatric:         Behavior: Behavior normal.              [facility] Call     Date and time call was made  12/28/2023 10:08 AM    Hospital care reviewed  Records " reviewed    Patient was hospitialized at  [facility]    Date of Admission  12/25/23    Date of discharge  12/27/23    Diagnosis  Acute on chronic diastolic heart failure    Disposition  Home    Were the patients medications reviewed and updated  No  He declined but knows to call if any questions    Current Symptoms  --  pt states he is weak and takes 10 steps and he feels weak      TCM Call     Post hospital issues  None    Should patient be enrolled in anticoag monitoring?  No    Scheduled for follow up?  Yes    Patients specialists  Cardiologist; Nephrologist; Other (comment)    Cardiologist name  Dr Bell     Other specialists contcat #  Sleep Lab    Did you obtain your prescribed medications  Yes    Do you need help managing your prescriptions or medications  No    Is transportation to your appointment needed  No    I have advised the patient to call PCP with any new or worsening symptoms  Sultana Austin CMA    Living Arrangements  Children    Support System  Children    The type of support provided  Emotional    Do you have social support  Yes, some    Are you recieving any outpatient services  No    Are you recieving home care services  Yes    Are you using any community resources  No    Current waiver services  No    Have you fallen in the last 12 months  No    Interperter language line needed  No    Counseling  Patient    Counseling topics  Activities of daily living; Importance of RX compliance; patient and family education; instructions for management; Risk factor reduction    Comments  Thomas states that he is doing ok. He has nausea and he is picking up his Zofran at the pharmacy later today. He has 2 daughters who help him and they both live close by. He knows to go to the ER if any chest pain, dyspnea, weakness, etc Jamie MoyleLPN Frank Lombardi, DO

## 2024-01-04 ENCOUNTER — OFFICE VISIT (OUTPATIENT)
Dept: WOUND CARE | Facility: HOSPITAL | Age: 79
End: 2024-01-04
Payer: COMMERCIAL

## 2024-01-04 VITALS
SYSTOLIC BLOOD PRESSURE: 137 MMHG | HEART RATE: 52 BPM | RESPIRATION RATE: 16 BRPM | DIASTOLIC BLOOD PRESSURE: 55 MMHG | TEMPERATURE: 96.9 F

## 2024-01-04 DIAGNOSIS — I50.32 CHRONIC HEART FAILURE WITH PRESERVED EJECTION FRACTION (HFPEF) (HCC): ICD-10-CM

## 2024-01-04 DIAGNOSIS — E11.622 DIABETIC ULCER OF ANKLE (HCC): ICD-10-CM

## 2024-01-04 DIAGNOSIS — L97.411 DIABETIC ULCER OF RIGHT MIDFOOT ASSOCIATED WITH DIABETES MELLITUS DUE TO UNDERLYING CONDITION, LIMITED TO BREAKDOWN OF SKIN (HCC): Primary | ICD-10-CM

## 2024-01-04 DIAGNOSIS — I73.9 PERIPHERAL ARTERY DISEASE (HCC): ICD-10-CM

## 2024-01-04 DIAGNOSIS — E08.621 DIABETIC ULCER OF RIGHT MIDFOOT ASSOCIATED WITH DIABETES MELLITUS DUE TO UNDERLYING CONDITION, LIMITED TO BREAKDOWN OF SKIN (HCC): Primary | ICD-10-CM

## 2024-01-04 DIAGNOSIS — Z79.4 TYPE 2 DIABETES MELLITUS WITH DIABETIC PERIPHERAL ANGIOPATHY WITHOUT GANGRENE, WITH LONG-TERM CURRENT USE OF INSULIN (HCC): ICD-10-CM

## 2024-01-04 DIAGNOSIS — L97.309 DIABETIC ULCER OF ANKLE (HCC): ICD-10-CM

## 2024-01-04 DIAGNOSIS — E11.51 TYPE 2 DIABETES MELLITUS WITH DIABETIC PERIPHERAL ANGIOPATHY WITHOUT GANGRENE, WITH LONG-TERM CURRENT USE OF INSULIN (HCC): ICD-10-CM

## 2024-01-04 PROCEDURE — 97597 DBRDMT OPN WND 1ST 20 CM/<: CPT | Performed by: STUDENT IN AN ORGANIZED HEALTH CARE EDUCATION/TRAINING PROGRAM

## 2024-01-04 RX ORDER — LIDOCAINE HYDROCHLORIDE 40 MG/ML
5 SOLUTION TOPICAL ONCE
Status: COMPLETED | OUTPATIENT
Start: 2024-01-04 | End: 2024-01-04

## 2024-01-04 RX ADMIN — LIDOCAINE HYDROCHLORIDE 5 ML: 40 SOLUTION TOPICAL at 13:45

## 2024-01-04 NOTE — PATIENT INSTRUCTIONS
Orders Placed This Encounter   Procedures    Wound miscellaneous orders     Protein: Eat protein with each meal to promote healing.  Examples of protein are fish, meat, chicken, nuts, peanut butter, eggs, lentils, edamame or a protein shake.          Wound infection:  If you have signs of infection please call the wound center.  If the wound center is closed- please go to the Emergency department.  Some signs of infection:  fever, chills, increased redness, red streaks, increase in pain, increased drainage.  Drainage with an odor, Change in drainage color: white/milky/green/tan/yellow,  an increase in swelling, chest pain and/or shortness of breath.     Standing Status:   Future     Standing Expiration Date:   1/4/2025    Wound cleansing and dressings     Wash your hands with soap and water.  Remove old dressing, discard into plastic bag and place in trash.  Cleanse the wound with soap and water prior to applying a clean dressing. Do not use tissue or cotton balls. Do not scrub the wound. Pat dry using gauze.  Shower yes   Apply Dermagran to the foot wounds.  Cover with gauze  Secure with rolled gauze and tape   Change dressing every other day     Standing Status:   Future     Standing Expiration Date:   1/4/2025

## 2024-01-04 NOTE — PROGRESS NOTES
"Patient ID: Thomas Horne is a 78 y.o. male Date of Birth 1945     Chief Complaint  Chief Complaint   Patient presents with    Follow Up Wound Care Visit     RLE wound       Allergies  Lisinopril    Assessment:     Diagnoses and all orders for this visit:    Diabetic ulcer of right midfoot associated with diabetes mellitus due to underlying condition, limited to breakdown of skin (HCC)  -     lidocaine (XYLOCAINE) 4 % topical solution 5 mL  -     Wound miscellaneous orders; Future  -     Wound cleansing and dressings; Future  -     Debridement    Type 2 diabetes mellitus with diabetic peripheral angiopathy without gangrene, with long-term current use of insulin (HCC)    Chronic HFpEF/Moderate pHTN (HFpEF) (HCC)    Peripheral artery disease (HCC)    Diabetic ulcer of ankle (HCC)  -     Debridement              Debridement   Wound 12/21/23 Diabetic Ulcer Ankle Posterior;Right    Universal Protocol:  Consent: Verbal consent obtained.  Risks and benefits: risks, benefits and alternatives were discussed  Consent given by: patient  Time out: Immediately prior to procedure a \"time out\" was called to verify the correct patient, procedure, equipment, support staff and site/side marked as required.  Patient identity confirmed: verbally with patient    Debridement Details  Performed by: physician  Debridement type: selective  Pain control: lidocaine 4%      Post-debridement measurements  Length (cm): 0.5  Width (cm): 0.6  Depth (cm): 0.2  Percent debrided: 90%  Surface Area (cm^2): 0.3  Area Debrided (cm^2): 0.27  Volume (cm^3): 0.06    Devitalized tissue debrided: biofilm and exudate  Instrument(s) utilized: curette  Bleeding: small  Hemostasis obtained with: pressure  Procedural pain (0-10): 1  Post-procedural pain: 0   Response to treatment: procedure was tolerated well    Debridement   Wound 12/21/23 Diabetic Ulcer Foot Right;Plantar    Universal Protocol:  Consent: Verbal consent obtained.  Risks and benefits: risks, " "benefits and alternatives were discussed  Consent given by: patient  Time out: Immediately prior to procedure a \"time out\" was called to verify the correct patient, procedure, equipment, support staff and site/side marked as required.  Patient identity confirmed: verbally with patient    Debridement Details  Performed by: physician  Debridement type: selective  Pain control: lidocaine 4%      Post-debridement measurements  Length (cm): 0.3  Width (cm): 0.3  Depth (cm): 0.1  Percent debrided: 90%  Surface Area (cm^2): 0.09  Area Debrided (cm^2): 0.08  Volume (cm^3): 0.01    Devitalized tissue debrided: biofilm, exudate and fibrin  Instrument(s) utilized: curette  Bleeding: small  Hemostasis obtained with: pressure  Procedural pain (0-10): 1  Post-procedural pain: 0   Response to treatment: procedure was tolerated well        Plan:  It was a pleasure to see Thomas Horne for wound care follow up today  Selective debridement performed today as above  Wound is improving   Continue plan of care as noted below with dermagran   A1C results reviewed with the patient today.   No signs or symptoms of infection today. Patient understands that if any signs of infection start (such as increased redness, drainage, pain, fever, chills, diaphoresis), they should call our office or proceed to the ER or Urgent Care.  Patient should continue a high protein diet to facilitate wound healing  Patient is advised to not submerge wound or leave wound open to air.  Follow up in 2 weeks  Given the multi-factorial nature of wound care, additional time was taken to review patient's treatment plan with other specialties and most recent pertinent lab work and imaging.   All plans of care discussed with patient at bedside who verbalized understanding with treatment plan.       Wound 12/21/23 Diabetic Ulcer Foot Right;Plantar (Active)   Enter Sumner score: Sumner Grade 2: Deep ulcer extended to ligament, tendon, joint capsule, bone, or deep fascia " without abscess or osteomyelitis (OM) 01/04/24 1339   Wound Image Images linked 01/04/24 1339   Wound Description Other (Comment) (callus) 01/04/24 1339   Clara-wound Assessment Dry;Pink 01/04/24 1339   Wound Length (cm) 0.2 cm 01/04/24 1339   Wound Width (cm) 0.3 cm 01/04/24 1339   Wound Depth (cm) 0 cm 01/04/24 1339   Wound Surface Area (cm^2) 0.06 cm^2 01/04/24 1339   Wound Volume (cm^3) 0 cm^3 01/04/24 1339   Calculated Wound Volume (cm^3) 0 cm^3 01/04/24 1339   Change in Wound Size % 100 01/04/24 1339   Drainage Amount None 01/04/24 1339   Non-staged Wound Description Full thickness 01/04/24 1339   Dressing Status Intact 01/04/24 1339       Wound 12/21/23 Diabetic Ulcer Ankle Posterior;Right (Active)   Enter Sumner score: Sumner Grade 2: Deep ulcer extended to ligament, tendon, joint capsule, bone, or deep fascia without abscess or osteomyelitis (OM) 01/04/24 1341   Wound Image Images linked 01/04/24 1342   Wound Description Epithelialization;Yellow;White 01/04/24 1341   Clara-wound Assessment Dry;Pink;Scaly 01/04/24 1341   Wound Length (cm) 0.5 cm 01/04/24 1341   Wound Width (cm) 0.5 cm 01/04/24 1341   Wound Depth (cm) 0.1 cm 01/04/24 1341   Wound Surface Area (cm^2) 0.25 cm^2 01/04/24 1341   Wound Volume (cm^3) 0.025 cm^3 01/04/24 1341   Calculated Wound Volume (cm^3) 0.03 cm^3 01/04/24 1341   Change in Wound Size % 40 01/04/24 1341   Drainage Amount Scant 01/04/24 1341   Drainage Description Yellow 01/04/24 1341   Non-staged Wound Description Full thickness 01/04/24 1341   Dressing Status Intact 01/04/24 1341       Wound 12/21/23 Diabetic Ulcer Foot Right;Plantar (Active)   Date First Assessed/Time First Assessed: 12/21/23 0948   Primary Wound Type: Diabetic Ulcer  Location: Foot  Wound Location Orientation: Right;Plantar  Wound Description (Comments): SUMNER 2       Wound 12/21/23 Diabetic Ulcer Ankle Posterior;Right (Active)   Date First Assessed/Time First Assessed: 12/21/23 0696   Primary Wound Type:  Diabetic Ulcer  Location: Ankle  Wound Location Orientation: Posterior;Right  Wound Description (Comments): MACDONALD 2       [REMOVED] Wound 11/16/23   Heel Anterior;Right (Removed)   Resolved Date: 12/21/23  Date First Assessed/Time First Assessed: 11/16/23 0700   Primary Wound Type: (c)   Traumatic Wound Type: (c)   Location: Heel  Wound Location Orientation: Anterior;Right  Dressing Status: Intact;Dry;Clean  Wound Outcome: (c) O...       [REMOVED] Wound 11/16/23 Leg Right (Removed)   Resolved Date: 01/04/24  Date First Assessed/Time First Assessed: 11/16/23 1440   Location: Leg  Wound Location Orientation: Right  Incision's 1st Dressing: KERLIX (x0), DRESSING MEPILEX AG BORDER POST-OP 4 X 8 IN (x1), ACE WRAP 6 IN STERILE (x0)  Wou...       [REMOVED] Wound 11/16/23 Groin Right (Removed)   Resolved Date: 01/04/24  Date First Assessed/Time First Assessed: 11/16/23 1440   Location: Groin  Wound Location Orientation: Right  Incision's 1st Dressing: ADHESIVE SKIN HIGH VISCOSITY EXOFIN 1ML (x0), DRESSING MEPILEX AG BORDER POST-OP 4 X 6 IN (x...       Subjective:      .    1/4/24: Since his last visit patient was briefly hospitalized for symptomatic anemia.  DAPT was held at that time and restarted thereafter. Has been applying dermagran as recommended. Happy with wound healing. No symptoms of infection.    12/21/23: Consult - Thomas is a pleasant 78-year-old male with a past medical history of type 2 diabetes mellitus most recent hemoglobin A1c 7.0% 2 months ago, diabetic polyneuropathy, HfpEF G2DD, peripheral arterial disease with history of revascularization most recently mild peroneal bypass to the right lower extremity., CKD, coronary artery disease history of CABG today for initial wound care consult regarding right heel wound which has been with patient for several months and being followed by podiatry.  Insult appears to have occurred  when patient stepped on a nail several months ago.  Patient follows with podiatry  for diabetic footcare as well as wound care.  Was most recently seen yesterday at their office.  Per chart review, patient also does follow with vascular surgery following the recent bypass he had on 11/16/2023..  This was done with CryoVein and it was recommended that wound care would be optimized quickly for a short period of time due to short patency of the CryoVein conduits.  Patient has next follow-up appointment later today with vascular surgery.        MRI R foot 10/26/23:    No evidence of osteomyelitis.   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                The following portions of the patient's history were reviewed and updated as appropriate: allergies, current medications, past family history, past medical history, past social history, past surgical history, and problem list.    Review of Systems   Constitutional:  Negative for chills, diaphoresis and fever.   Skin:  Positive for wound.   All other systems reviewed and are negative.        Objective:       Wound 12/21/23 Diabetic Ulcer Foot Right;Plantar (Active)   Enter Sumner score: Sumner Grade 2: Deep ulcer extended to ligament, tendon, joint capsule, bone, or deep fascia without abscess or osteomyelitis (OM) 01/04/24 1339   Wound Image Images linked 01/04/24 1339   Wound Description Other (Comment) (callus) 01/04/24 1339   Clara-wound Assessment Dry;Pink 01/04/24 1339   Wound Length (cm) 0.2 cm 01/04/24 1339   Wound Width (cm) 0.3 cm 01/04/24 1339   Wound Depth (cm) 0 cm 01/04/24 1339   Wound Surface Area (cm^2) 0.06 cm^2 01/04/24 1339   Wound Volume (cm^3) 0 cm^3 01/04/24 1339   Calculated Wound Volume (cm^3) 0 cm^3 01/04/24 1339   Change in Wound Size % 100 01/04/24 1339   Drainage Amount None 01/04/24 1339   Non-staged Wound Description Full thickness 01/04/24 1339   Dressing Status Intact 01/04/24 1339       Wound 12/21/23 Diabetic Ulcer Ankle Posterior;Right (Active)   Enter Sumner score: Sumner Grade 2:  Deep ulcer extended to ligament, tendon, joint capsule, bone, or deep fascia without abscess or osteomyelitis (OM) 01/04/24 1341   Wound Image Images linked 01/04/24 1342   Wound Description Epithelialization;Yellow;White 01/04/24 1341   Clara-wound Assessment Dry;Pink;Scaly 01/04/24 1341   Wound Length (cm) 0.5 cm 01/04/24 1341   Wound Width (cm) 0.5 cm 01/04/24 1341   Wound Depth (cm) 0.1 cm 01/04/24 1341   Wound Surface Area (cm^2) 0.25 cm^2 01/04/24 1341   Wound Volume (cm^3) 0.025 cm^3 01/04/24 1341   Calculated Wound Volume (cm^3) 0.03 cm^3 01/04/24 1341   Change in Wound Size % 40 01/04/24 1341   Drainage Amount Scant 01/04/24 1341   Drainage Description Yellow 01/04/24 1341   Non-staged Wound Description Full thickness 01/04/24 1341   Dressing Status Intact 01/04/24 1341       /55   Pulse (!) 52   Temp (!) 96.9 °F (36.1 °C)   Resp 16     Physical Exam  Vitals reviewed.   Constitutional:       Appearance: Normal appearance.   HENT:      Head: Normocephalic and atraumatic.   Eyes:      Extraocular Movements: Extraocular movements intact.   Pulmonary:      Effort: Pulmonary effort is normal.   Musculoskeletal:      Cervical back: Neck supple.   Skin:     Comments: Right heel and ankle wound similar in size to last exam.  Minimal callus deposition.  No signs of infection.   Neurological:      Mental Status: He is alert.   Psychiatric:         Mood and Affect: Mood normal.                 Wound Instructions:  Orders Placed This Encounter   Procedures    Wound miscellaneous orders     Protein: Eat protein with each meal to promote healing.  Examples of protein are fish, meat, chicken, nuts, peanut butter, eggs, lentils, edamame or a protein shake.          Wound infection:  If you have signs of infection please call the wound center.  If the wound center is closed- please go to the Emergency department.  Some signs of infection:  fever, chills, increased redness, red streaks, increase in pain, increased  "drainage.  Drainage with an odor, Change in drainage color: white/milky/green/tan/yellow,  an increase in swelling, chest pain and/or shortness of breath.     Standing Status:   Future     Standing Expiration Date:   1/4/2025    Wound cleansing and dressings     Wash your hands with soap and water.  Remove old dressing, discard into plastic bag and place in trash.  Cleanse the wound with soap and water prior to applying a clean dressing. Do not use tissue or cotton balls. Do not scrub the wound. Pat dry using gauze.  Shower yes   Apply Dermagran to the foot wounds.  Cover with gauze  Secure with rolled gauze and tape   Change dressing every other day     Standing Status:   Future     Standing Expiration Date:   1/4/2025    Debridement     This order was created via procedure documentation    Debridement     This order was created via procedure documentation        Diagnosis ICD-10-CM Associated Orders   1. Diabetic ulcer of right midfoot associated with diabetes mellitus due to underlying condition, limited to breakdown of skin ([facility])  E08.621 lidocaine (XYLOCAINE) 4 % topical solution 5 mL    L97.411 Wound miscellaneous orders     Wound cleansing and dressings     Debridement      2. Type 2 diabetes mellitus with diabetic peripheral angiopathy without gangrene, with long-term current use of insulin ([facility])  E11.51     Z79.4       3. Chronic HFpEF/Moderate pHTN (HFpEF) ([facility])  I50.32       4. Peripheral artery disease ([facility])  I73.9       5. Diabetic ulcer of ankle ([facility])  E11.622 Debridement    L97.309           --  David Frye MD    \"This note has been constructed using a voice recognition system. Therefore there may be syntax, spelling, and/or grammatical errors. Occasional wrong word or \"sound alike\" substitutions may have occurred due to the inherent limitations of voice recognition software. Read the chart carefully and recognize, using context, where substitutions have occurred. Please call if you have any " "questions.\"     "

## 2024-01-11 ENCOUNTER — HOSPITAL ENCOUNTER (OUTPATIENT)
Dept: RADIOLOGY | Facility: HOSPITAL | Age: 79
Discharge: HOME | End: 2024-01-11
Attending: SURGERY
Payer: COMMERCIAL

## 2024-01-11 DIAGNOSIS — L97.411 DIABETIC ULCER OF RIGHT MIDFOOT ASSOCIATED WITH DIABETES MELLITUS DUE TO UNDERLYING CONDITION, LIMITED TO BREAKDOWN OF SKIN (HCC): ICD-10-CM

## 2024-01-11 DIAGNOSIS — E08.621 DIABETIC ULCER OF RIGHT MIDFOOT ASSOCIATED WITH DIABETES MELLITUS DUE TO UNDERLYING CONDITION, LIMITED TO BREAKDOWN OF SKIN (HCC): ICD-10-CM

## 2024-01-11 PROCEDURE — 93922 UPR/L XTREMITY ART 2 LEVELS: CPT | Performed by: SURGERY

## 2024-01-11 PROCEDURE — 93926 LOWER EXTREMITY STUDY: CPT | Performed by: SURGERY

## 2024-01-11 PROCEDURE — 93926 LOWER EXTREMITY STUDY: CPT

## 2024-01-12 ENCOUNTER — APPOINTMENT (OUTPATIENT)
Dept: LAB | Facility: CLINIC | Age: 79
End: 2024-01-12
Payer: COMMERCIAL

## 2024-01-12 DIAGNOSIS — N18.32 STAGE 3B CHRONIC KIDNEY DISEASE (HCC): ICD-10-CM

## 2024-01-12 DIAGNOSIS — D50.0 IRON DEFICIENCY ANEMIA DUE TO CHRONIC BLOOD LOSS: ICD-10-CM

## 2024-01-12 DIAGNOSIS — D64.9 SYMPTOMATIC ANEMIA: ICD-10-CM

## 2024-01-12 DIAGNOSIS — K92.1 MELENA: ICD-10-CM

## 2024-01-12 DIAGNOSIS — K92.2 GI BLEED: ICD-10-CM

## 2024-01-12 LAB
ANION GAP SERPL CALCULATED.3IONS-SCNC: 8 MMOL/L
BASOPHILS # BLD AUTO: 0.05 THOUSANDS/ÂΜL (ref 0–0.1)
BASOPHILS NFR BLD AUTO: 1 % (ref 0–1)
BUN SERPL-MCNC: 31 MG/DL (ref 5–25)
CALCIUM SERPL-MCNC: 9.3 MG/DL (ref 8.4–10.2)
CHLORIDE SERPL-SCNC: 103 MMOL/L (ref 96–108)
CO2 SERPL-SCNC: 27 MMOL/L (ref 21–32)
CREAT SERPL-MCNC: 1.54 MG/DL (ref 0.6–1.3)
EOSINOPHIL # BLD AUTO: 0.18 THOUSAND/ÂΜL (ref 0–0.61)
EOSINOPHIL NFR BLD AUTO: 3 % (ref 0–6)
ERYTHROCYTE [DISTWIDTH] IN BLOOD BY AUTOMATED COUNT: 14.6 % (ref 11.6–15.1)
GFR SERPL CREATININE-BSD FRML MDRD: 41 ML/MIN/1.73SQ M
GLUCOSE SERPL-MCNC: 188 MG/DL (ref 65–140)
HCT VFR BLD AUTO: 30.9 % (ref 36.5–49.3)
HGB BLD-MCNC: 9.2 G/DL (ref 12–17)
IMM GRANULOCYTES # BLD AUTO: 0.04 THOUSAND/UL (ref 0–0.2)
IMM GRANULOCYTES NFR BLD AUTO: 1 % (ref 0–2)
LYMPHOCYTES # BLD AUTO: 1.36 THOUSANDS/ÂΜL (ref 0.6–4.47)
LYMPHOCYTES NFR BLD AUTO: 21 % (ref 14–44)
MCH RBC QN AUTO: 25 PG (ref 26.8–34.3)
MCHC RBC AUTO-ENTMCNC: 29.8 G/DL (ref 31.4–37.4)
MCV RBC AUTO: 84 FL (ref 82–98)
MONOCYTES # BLD AUTO: 0.46 THOUSAND/ÂΜL (ref 0.17–1.22)
MONOCYTES NFR BLD AUTO: 7 % (ref 4–12)
NEUTROPHILS # BLD AUTO: 4.54 THOUSANDS/ÂΜL (ref 1.85–7.62)
NEUTS SEG NFR BLD AUTO: 67 % (ref 43–75)
NRBC BLD AUTO-RTO: 0 /100 WBCS
PLATELET # BLD AUTO: 380 THOUSANDS/UL (ref 149–390)
PMV BLD AUTO: 10.2 FL (ref 8.9–12.7)
POTASSIUM SERPL-SCNC: 4.3 MMOL/L (ref 3.5–5.3)
RBC # BLD AUTO: 3.68 MILLION/UL (ref 3.88–5.62)
SODIUM SERPL-SCNC: 138 MMOL/L (ref 135–147)
WBC # BLD AUTO: 6.63 THOUSAND/UL (ref 4.31–10.16)

## 2024-01-12 PROCEDURE — 36415 COLL VENOUS BLD VENIPUNCTURE: CPT

## 2024-01-12 PROCEDURE — 80048 BASIC METABOLIC PNL TOTAL CA: CPT

## 2024-01-12 PROCEDURE — 85025 COMPLETE CBC W/AUTO DIFF WBC: CPT

## 2024-01-15 ENCOUNTER — TELEPHONE (OUTPATIENT)
Dept: NEPHROLOGY | Facility: CLINIC | Age: 79
End: 2024-01-15

## 2024-01-15 NOTE — TELEPHONE ENCOUNTER
Message left on patient's VM that labs of  1/12/24 show stable kidney function per  Dr. Fischer.      ----- Message from Jose Fischer MD sent at 1/12/2024  4:29 PM EST -----  Please inform patient that most recent labs (1/12/24) show that kidney function is stable.

## 2024-01-17 ENCOUNTER — TELEPHONE (OUTPATIENT)
Dept: FAMILY MEDICINE CLINIC | Facility: CLINIC | Age: 79
End: 2024-01-17

## 2024-01-17 DIAGNOSIS — E11.42 TYPE 2 DIABETES MELLITUS WITH DIABETIC POLYNEUROPATHY, WITH LONG-TERM CURRENT USE OF INSULIN (HCC): Primary | ICD-10-CM

## 2024-01-17 DIAGNOSIS — Z79.4 TYPE 2 DIABETES MELLITUS WITH DIABETIC POLYNEUROPATHY, WITH LONG-TERM CURRENT USE OF INSULIN (HCC): Primary | ICD-10-CM

## 2024-01-17 RX ORDER — GLIMEPIRIDE 2 MG/1
2 TABLET ORAL 2 TIMES DAILY
Qty: 180 TABLET | Refills: 3 | Status: SHIPPED | OUTPATIENT
Start: 2024-01-17 | End: 2025-01-11

## 2024-01-17 NOTE — TELEPHONE ENCOUNTER
Patient call for refill on Glimepiride 2 MG.Takes 1 tablet twice a day. Not on active medication list. Patient uses [facility] [phone]

## 2024-01-18 ENCOUNTER — OFFICE VISIT (OUTPATIENT)
Dept: WOUND CARE | Facility: HOSPITAL | Age: 79
End: 2024-01-18
Payer: COMMERCIAL

## 2024-01-18 VITALS
SYSTOLIC BLOOD PRESSURE: 118 MMHG | DIASTOLIC BLOOD PRESSURE: 62 MMHG | TEMPERATURE: 97.5 F | HEART RATE: 68 BPM | RESPIRATION RATE: 18 BRPM

## 2024-01-18 DIAGNOSIS — E11.622 DIABETIC ULCER OF ANKLE (HCC): ICD-10-CM

## 2024-01-18 DIAGNOSIS — L97.309 DIABETIC ULCER OF ANKLE (HCC): ICD-10-CM

## 2024-01-18 DIAGNOSIS — L97.411 DIABETIC ULCER OF RIGHT MIDFOOT ASSOCIATED WITH DIABETES MELLITUS DUE TO UNDERLYING CONDITION, LIMITED TO BREAKDOWN OF SKIN (HCC): ICD-10-CM

## 2024-01-18 DIAGNOSIS — E08.621 DIABETIC ULCER OF RIGHT MIDFOOT ASSOCIATED WITH DIABETES MELLITUS DUE TO UNDERLYING CONDITION, LIMITED TO BREAKDOWN OF SKIN (HCC): ICD-10-CM

## 2024-01-18 DIAGNOSIS — Z79.4 TYPE 2 DIABETES MELLITUS WITH DIABETIC PERIPHERAL ANGIOPATHY WITHOUT GANGRENE, WITH LONG-TERM CURRENT USE OF INSULIN (HCC): ICD-10-CM

## 2024-01-18 DIAGNOSIS — E11.51 TYPE 2 DIABETES MELLITUS WITH DIABETIC PERIPHERAL ANGIOPATHY WITHOUT GANGRENE, WITH LONG-TERM CURRENT USE OF INSULIN (HCC): ICD-10-CM

## 2024-01-18 PROCEDURE — 97597 DBRDMT OPN WND 1ST 20 CM/<: CPT | Performed by: STUDENT IN AN ORGANIZED HEALTH CARE EDUCATION/TRAINING PROGRAM

## 2024-01-18 RX ORDER — LIDOCAINE HYDROCHLORIDE 40 MG/ML
5 SOLUTION TOPICAL ONCE
Status: COMPLETED | OUTPATIENT
Start: 2024-01-18 | End: 2024-01-18

## 2024-01-18 RX ADMIN — LIDOCAINE HYDROCHLORIDE 5 ML: 40 SOLUTION TOPICAL at 10:01

## 2024-01-18 NOTE — PROGRESS NOTES
"Patient ID: Thomas Horne is a 79 y.o. male Date of Birth 1945     Chief Complaint  Chief Complaint   Patient presents with    Follow Up Wound Care Visit       Allergies  Lisinopril    Assessment:     Diagnoses and all orders for this visit:    Diabetic ulcer of right midfoot associated with diabetes mellitus due to underlying condition, limited to breakdown of skin (HCC)  -     Wound cleansing and dressings; Future  -     lidocaine (XYLOCAINE) 4 % topical solution 5 mL    Type 2 diabetes mellitus with diabetic peripheral angiopathy without gangrene, with long-term current use of insulin (HCC)  -     Wound cleansing and dressings; Future  -     lidocaine (XYLOCAINE) 4 % topical solution 5 mL    Diabetic ulcer of ankle (HCC)  -     Wound cleansing and dressings; Future  -     Debridement              Debridement   Wound 12/21/23 Diabetic Ulcer Ankle Posterior;Right    Universal Protocol:  Consent: Verbal consent obtained.  Risks and benefits: risks, benefits and alternatives were discussed  Consent given by: patient  Time out: Immediately prior to procedure a \"time out\" was called to verify the correct patient, procedure, equipment, support staff and site/side marked as required.  Patient identity confirmed: verbally with patient    Debridement Details  Performed by: physician  Debridement type: selective  Pain control: lidocaine 4%      Post-debridement measurements  Length (cm): 0.5  Width (cm): 0.5  Depth (cm): 0.1  Percent debrided: 90%  Surface Area (cm^2): 0.25  Area Debrided (cm^2): 0.23  Volume (cm^3): 0.03    Devitalized tissue debrided: biofilm and exudate  Instrument(s) utilized: curette  Bleeding: small  Hemostasis obtained with: pressure  Procedural pain (0-10): 1  Post-procedural pain: 0   Response to treatment: procedure was tolerated well        Plan:  It was a pleasure to see Thomas Horne for wound care follow up today  Selective debridement performed today as above to ankle wound. Plantar foot " wound is completely epithelialized   Continue plan of care as noted below with dermagran  No signs or symptoms of infection today. Patient understands that if any signs of infection start (such as increased redness, drainage, pain, fever, chills, diaphoresis), they should call our office or proceed to the ER or Urgent Care.  Patient should continue a high protein diet to facilitate wound healing  Patient is advised to not submerge wound or leave wound open to air.  Follow up in 1 weeks  Given the multi-factorial nature of wound care, additional time was taken to review patient's treatment plan with other specialties and most recent pertinent lab work and imaging.   All plans of care discussed with patient at bedside who verbalized understanding with treatment plan.    Wound 12/21/23 Diabetic Ulcer Foot Right;Plantar (Active)   Wound Image Images linked 01/18/24 0957   Wound Description Brown 01/18/24 0957   Clara-wound Assessment Dry 01/18/24 0957   Wound Length (cm) 0 cm 01/18/24 0957   Wound Width (cm) 0 cm 01/18/24 0957   Wound Depth (cm) 0 cm 01/18/24 0957   Wound Surface Area (cm^2) 0 cm^2 01/18/24 0957   Wound Volume (cm^3) 0 cm^3 01/18/24 0957   Calculated Wound Volume (cm^3) 0 cm^3 01/18/24 0957   Change in Wound Size % 100 01/18/24 0957   Drainage Amount None 01/18/24 0957       Wound 12/21/23 Diabetic Ulcer Ankle Posterior;Right (Active)   Wound Image Images linked 01/18/24 0958   Wound Description Pale;Pink 01/18/24 0958   Clara-wound Assessment Maceration;White 01/18/24 0958   Wound Length (cm) 0.5 cm 01/18/24 0958   Wound Width (cm) 0.5 cm 01/18/24 0958   Wound Depth (cm) 0.1 cm 01/18/24 0958   Wound Surface Area (cm^2) 0.25 cm^2 01/18/24 0958   Wound Volume (cm^3) 0.025 cm^3 01/18/24 0958   Calculated Wound Volume (cm^3) 0.03 cm^3 01/18/24 0958   Change in Wound Size % 40 01/18/24 0958   Drainage Amount Small 01/18/24 0958   Drainage Description Yellow 01/18/24 0958   Non-staged Wound Description Full  thickness 01/18/24 0958       Wound 12/21/23 Diabetic Ulcer Foot Right;Plantar (Active)   Date First Assessed/Time First Assessed: 12/21/23 0948   Primary Wound Type: Diabetic Ulcer  Location: Foot  Wound Location Orientation: Right;Plantar  Wound Description (Comments): MACDONALD 2       Wound 12/21/23 Diabetic Ulcer Ankle Posterior;Right (Active)   Date First Assessed/Time First Assessed: 12/21/23 0954   Primary Wound Type: Diabetic Ulcer  Location: Ankle  Wound Location Orientation: Posterior;Right  Wound Description (Comments): MACDONALD 2       [REMOVED] Wound 11/16/23   Heel Anterior;Right (Removed)   Resolved Date: 12/21/23  Date First Assessed/Time First Assessed: 11/16/23 0700   Primary Wound Type: (c)   Traumatic Wound Type: (c)   Location: Heel  Wound Location Orientation: Anterior;Right  Dressing Status: Intact;Dry;Clean  Wound Outcome: (c) O...       [REMOVED] Wound 11/16/23 Leg Right (Removed)   Resolved Date: 01/04/24  Date First Assessed/Time First Assessed: 11/16/23 1440   Location: Leg  Wound Location Orientation: Right  Incision's 1st Dressing: KERLIX (x0), DRESSING MEPILEX AG BORDER POST-OP 4 X 8 IN (x1), ACE WRAP 6 IN STERILE (x0)  Wou...       [REMOVED] Wound 11/16/23 Groin Right (Removed)   Resolved Date: 01/04/24  Date First Assessed/Time First Assessed: 11/16/23 1440   Location: Groin  Wound Location Orientation: Right  Incision's 1st Dressing: ADHESIVE SKIN HIGH VISCOSITY EXOFIN 1ML (x0), DRESSING MEPILEX AG BORDER POST-OP 4 X 6 IN (x...       Subjective:      .    1/18/24: Applying wound dressings as directed.  Happy with wound healing.    1/4/24: Since his last visit patient was briefly hospitalized for symptomatic anemia.  DAPT was held at that time and restarted thereafter. Has been applying dermagran as recommended. Happy with wound healing. No symptoms of infection.     12/21/23: Hayden Guzman is a pleasant 78-year-old male with a past medical history of type 2 diabetes mellitus most  recent hemoglobin A1c 7.0% 2 months ago, diabetic polyneuropathy, HfpEF G2DD, peripheral arterial disease with history of revascularization most recently mild peroneal bypass to the right lower extremity., CKD, coronary artery disease history of CABG today for initial wound care consult regarding right heel wound which has been with patient for several months and being followed by podiatry.  Insult appears to have occurred  when patient stepped on a nail several months ago.  Patient follows with podiatry for diabetic footcare as well as wound care.  Was most recently seen yesterday at their office.  Per chart review, patient also does follow with vascular surgery following the recent bypass he had on 11/16/2023..  This was done with CryoVein and it was recommended that wound care would be optimized quickly for a short period of time due to short patency of the CryoVein conduits.  Patient has next follow-up appointment later today with vascular surgery.        MRI R foot 10/26/23:    No evidence of osteomyelitis.   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             The following portions of the patient's history were reviewed and updated as appropriate: allergies, current medications, past family history, past medical history, past social history, past surgical history, and problem list.    Review of Systems   Constitutional:  Negative for chills, diaphoresis and fever.   Skin:  Positive for wound.   All other systems reviewed and are negative.        Objective:       Wound 12/21/23 Diabetic Ulcer Foot Right;Plantar (Active)   Wound Image Images linked 01/18/24 0957   Wound Description Brown 01/18/24 0957   Clara-wound Assessment Dry 01/18/24 0957   Wound Length (cm) 0 cm 01/18/24 0957   Wound Width (cm) 0 cm 01/18/24 0957   Wound Depth (cm) 0 cm 01/18/24 0957   Wound Surface Area (cm^2) 0 cm^2 01/18/24 0957   Wound Volume (cm^3) 0 cm^3 01/18/24 0957   Calculated Wound Volume  (cm^3) 0 cm^3 01/18/24 0957   Change in Wound Size % 100 01/18/24 0957   Drainage Amount None 01/18/24 0957       Wound 12/21/23 Diabetic Ulcer Ankle Posterior;Right (Active)   Wound Image Images linked 01/18/24 0958   Wound Description Pale;Pink 01/18/24 0958   Clara-wound Assessment Maceration;White 01/18/24 0958   Wound Length (cm) 0.5 cm 01/18/24 0958   Wound Width (cm) 0.5 cm 01/18/24 0958   Wound Depth (cm) 0.1 cm 01/18/24 0958   Wound Surface Area (cm^2) 0.25 cm^2 01/18/24 0958   Wound Volume (cm^3) 0.025 cm^3 01/18/24 0958   Calculated Wound Volume (cm^3) 0.03 cm^3 01/18/24 0958   Change in Wound Size % 40 01/18/24 0958   Drainage Amount Small 01/18/24 0958   Drainage Description Yellow 01/18/24 0958   Non-staged Wound Description Full thickness 01/18/24 0958       /62   Pulse 68   Temp 97.5 °F (36.4 °C)   Resp 18     Physical Exam  Vitals reviewed.   Constitutional:       Appearance: Normal appearance.   HENT:      Head: Normocephalic and atraumatic.   Eyes:      Extraocular Movements: Extraocular movements intact.   Pulmonary:      Effort: Pulmonary effort is normal.   Musculoskeletal:      Cervical back: Neck supple.   Skin:     Comments: Plantar foot wound fully epithelialized.  No open wound.  No exudate.    Posterior ankle wound smaller than last exam.  Healthy wound bed.  No signs of infection.   Neurological:      Mental Status: He is alert.   Psychiatric:         Mood and Affect: Mood normal.                 Wound Instructions:  Orders Placed This Encounter   Procedures    Wound cleansing and dressings     Wash your hands with soap and water.  Remove old dressing, discard into plastic bag and place in trash.  Cleanse the wound with soap and water prior to applying a clean dressing. Do not use tissue or cotton balls. Do not scrub the wound. Pat dry using gauze.  Shower yes   Apply Dermagran to the foot wounds.  Cover with gauze  Secure with rolled gauze and tape   Change dressing every other  "day     Standing Status:   Future     Standing Expiration Date:   1/18/2025    Debridement     This order was created via procedure documentation        Diagnosis ICD-10-CM Associated Orders   1. Diabetic ulcer of right midfoot associated with diabetes mellitus due to underlying condition, limited to breakdown of skin ([facility])  E08.621 Wound cleansing and dressings    L97.411 lidocaine (XYLOCAINE) 4 % topical solution 5 mL      2. Type 2 diabetes mellitus with diabetic peripheral angiopathy without gangrene, with long-term current use of insulin ([facility])  E11.51 Wound cleansing and dressings    Z79.4 lidocaine (XYLOCAINE) 4 % topical solution 5 mL      3. Diabetic ulcer of ankle ([facility])  E11.622 Wound cleansing and dressings    L97.309 Debridement          --  David Frye MD    \"This note has been constructed using a voice recognition system. Therefore there may be syntax, spelling, and/or grammatical errors. Occasional wrong word or \"sound alike\" substitutions may have occurred due to the inherent limitations of voice recognition software. Read the chart carefully and recognize, using context, where substitutions have occurred. Please call if you have any questions.\"     "

## 2024-01-18 NOTE — PATIENT INSTRUCTIONS
Orders Placed This Encounter   Procedures    Wound cleansing and dressings     Wash your hands with soap and water.  Remove old dressing, discard into plastic bag and place in trash.  Cleanse the wound with soap and water prior to applying a clean dressing. Do not use tissue or cotton balls. Do not scrub the wound. Pat dry using gauze.  Shower yes   Apply Dermagran to the foot wounds.  Cover with gauze  Secure with rolled gauze and tape   Change dressing every other day     Standing Status:   Future     Standing Expiration Date:   1/18/2025

## 2024-01-25 ENCOUNTER — OFFICE VISIT (OUTPATIENT)
Dept: WOUND CARE | Facility: HOSPITAL | Age: 79
End: 2024-01-25
Payer: COMMERCIAL

## 2024-01-25 VITALS — DIASTOLIC BLOOD PRESSURE: 67 MMHG | TEMPERATURE: 96.8 F | SYSTOLIC BLOOD PRESSURE: 150 MMHG | HEART RATE: 59 BPM

## 2024-01-25 DIAGNOSIS — I73.9 PERIPHERAL ARTERY DISEASE (HCC): ICD-10-CM

## 2024-01-25 DIAGNOSIS — Z79.4 TYPE 2 DIABETES MELLITUS WITH DIABETIC PERIPHERAL ANGIOPATHY WITHOUT GANGRENE, WITH LONG-TERM CURRENT USE OF INSULIN (HCC): ICD-10-CM

## 2024-01-25 DIAGNOSIS — E11.622 DIABETIC ULCER OF ANKLE (HCC): Primary | ICD-10-CM

## 2024-01-25 DIAGNOSIS — L97.309 DIABETIC ULCER OF ANKLE (HCC): Primary | ICD-10-CM

## 2024-01-25 DIAGNOSIS — E11.51 TYPE 2 DIABETES MELLITUS WITH DIABETIC PERIPHERAL ANGIOPATHY WITHOUT GANGRENE, WITH LONG-TERM CURRENT USE OF INSULIN (HCC): ICD-10-CM

## 2024-01-25 PROCEDURE — 97597 DBRDMT OPN WND 1ST 20 CM/<: CPT | Performed by: STUDENT IN AN ORGANIZED HEALTH CARE EDUCATION/TRAINING PROGRAM

## 2024-01-25 RX ORDER — LIDOCAINE HYDROCHLORIDE 40 MG/ML
5 SOLUTION TOPICAL ONCE
Status: COMPLETED | OUTPATIENT
Start: 2024-01-25 | End: 2024-01-25

## 2024-01-25 RX ADMIN — LIDOCAINE HYDROCHLORIDE 5 ML: 40 SOLUTION TOPICAL at 10:00

## 2024-01-25 NOTE — PROGRESS NOTES
"Patient ID: Thomas Horne is a 79 y.o. male Date of Birth 1945     Chief Complaint  Chief Complaint   Patient presents with    Follow Up Wound Care Visit     Open wound R DFU       Allergies  Lisinopril    Assessment:     Diagnoses and all orders for this visit:    Diabetic ulcer of ankle (HCC)  -     lidocaine (XYLOCAINE) 4 % topical solution 5 mL  -     Cancel: Wound cleansing and dressings Diabetic Ulcer Posterior;Right Ankle; Future  -     Wound cleansing and dressings Diabetic Ulcer Posterior;Right Ankle; Future  -     Debridement    Type 2 diabetes mellitus with diabetic peripheral angiopathy without gangrene, with long-term current use of insulin (HCC)  -     lidocaine (XYLOCAINE) 4 % topical solution 5 mL  -     Cancel: Wound cleansing and dressings Diabetic Ulcer Posterior;Right Ankle; Future  -     Wound cleansing and dressings Diabetic Ulcer Posterior;Right Ankle; Future    Peripheral artery disease (HCC)  -     lidocaine (XYLOCAINE) 4 % topical solution 5 mL  -     Cancel: Wound cleansing and dressings Diabetic Ulcer Posterior;Right Ankle; Future  -     Wound cleansing and dressings Diabetic Ulcer Posterior;Right Ankle; Future              Debridement   Wound 12/21/23 Diabetic Ulcer Ankle Posterior;Right    Universal Protocol:  Consent: Verbal consent obtained.  Risks and benefits: risks, benefits and alternatives were discussed  Consent given by: patient  Time out: Immediately prior to procedure a \"time out\" was called to verify the correct patient, procedure, equipment, support staff and site/side marked as required.  Patient identity confirmed: verbally with patient    Debridement Details  Performed by: physician  Debridement type: selective  Pain control: lidocaine 4%      Post-debridement measurements  Length (cm): 0.5  Width (cm): 0.5  Depth (cm): 0.2  Percent debrided: 80%  Surface Area (cm^2): 0.25  Area Debrided (cm^2): 0.2  Volume (cm^3): 0.05    Devitalized tissue debrided: biofilm, " exudate and slough  Instrument(s) utilized: curette  Bleeding: small  Hemostasis obtained with: pressure  Procedural pain (0-10): 1  Post-procedural pain: 0   Response to treatment: procedure was tolerated well        Plan:  It was a pleasure to see Thomas Horne for wound care follow up today  Selective debridement performed today as above  Wound is improving   Continue plan of care as noted below with dermagran, gauze, tape  We discussed today that he would benefit from football dressing.  Unable to do this today as he drove himself here and will need his right foot to drive.  Patient will see if there is someone who can help him with transport and may consider football dressing at next appointment  No signs or symptoms of infection today. Patient understands that if any signs of infection start (such as increased redness, drainage, pain, fever, chills, diaphoresis), they should call our office or proceed to the ER or Urgent Care.  Patient should continue a high protein diet to facilitate wound healing  Patient is advised to not submerge wound or leave wound open to air.  Follow up in 1 weeks  Given the multi-factorial nature of wound care, additional time was taken to review patient's treatment plan with other specialties and most recent pertinent lab work and imaging.   All plans of care discussed with patient at bedside who verbalized understanding with treatment plan.       Wound 12/21/23 Diabetic Ulcer Ankle Posterior;Right (Active)   Wound Image Images linked 01/25/24 0956   Wound Description Pale;Pink;Slough;White;Yellow (non-granulating tissue) 01/25/24 0956   Clara-wound Assessment Maceration;White 01/25/24 0956   Wound Length (cm) 0.5 cm 01/25/24 0956   Wound Width (cm) 0.5 cm 01/25/24 0956   Wound Depth (cm) 0.2 cm 01/25/24 0956   Wound Surface Area (cm^2) 0.25 cm^2 01/25/24 0956   Wound Volume (cm^3) 0.05 cm^3 01/25/24 0956   Calculated Wound Volume (cm^3) 0.05 cm^3 01/25/24 0956   Change in Wound Size %  0 01/25/24 0956   Drainage Amount Small 01/25/24 0956   Drainage Description Yellow;Serous 01/25/24 0956   Non-staged Wound Description Full thickness 01/25/24 0956   Dressing Status Intact;New drainage (upon arrival) 01/25/24 0956       Wound 12/21/23 Diabetic Ulcer Ankle Posterior;Right (Active)   Date First Assessed/Time First Assessed: 12/21/23 0954   Primary Wound Type: Diabetic Ulcer  Location: Ankle  Wound Location Orientation: Posterior;Right  Wound Description (Comments): MACDONALD 2       [REMOVED] Wound 11/16/23   Heel Anterior;Right (Removed)   Resolved Date: 12/21/23  Date First Assessed/Time First Assessed: 11/16/23 0700   Primary Wound Type: (c)   Traumatic Wound Type: (c)   Location: Heel  Wound Location Orientation: Anterior;Right  Dressing Status: Intact;Dry;Clean  Wound Outcome: (c) O...       [REMOVED] Wound 11/16/23 Leg Right (Removed)   Resolved Date: 01/04/24  Date First Assessed/Time First Assessed: 11/16/23 1440   Location: Leg  Wound Location Orientation: Right  Incision's 1st Dressing: KERLIX (x0), DRESSING MEPILEX AG BORDER POST-OP 4 X 8 IN (x1), ACE WRAP 6 IN STERILE (x0)  Wou...       [REMOVED] Wound 11/16/23 Groin Right (Removed)   Resolved Date: 01/04/24  Date First Assessed/Time First Assessed: 11/16/23 1440   Location: Groin  Wound Location Orientation: Right  Incision's 1st Dressing: ADHESIVE SKIN HIGH VISCOSITY EXOFIN 1ML (x0), DRESSING MEPILEX AG BORDER POST-OP 4 X 6 IN (x...       [REMOVED] Wound 12/21/23 Diabetic Ulcer Foot Right;Plantar (Removed)   Resolved Date: 01/18/24  Date First Assessed/Time First Assessed: 12/21/23 0948   Primary Wound Type: Diabetic Ulcer  Location: Foot  Wound Location Orientation: Right;Plantar  Wound Description (Comments): MACDONALD 2       Subjective:      .    1/25/24: Using over-the-counter bandaids to secure Dermagran.  Notes more maceration around the wound.  Denies any symptoms of infection    1/18/24: Applying wound dressings as directed.  Happy  with wound healing.     1/4/24: Since his last visit patient was briefly hospitalized for symptomatic anemia.  DAPT was held at that time and restarted thereafter. Has been applying dermagran as recommended. Happy with wound healing. No symptoms of infection.     12/21/23: Consult - Thomas is a pleasant 78-year-old male with a past medical history of type 2 diabetes mellitus most recent hemoglobin A1c 7.0% 2 months ago, diabetic polyneuropathy, HfpEF G2DD, peripheral arterial disease with history of revascularization most recently mild peroneal bypass to the right lower extremity., CKD, coronary artery disease history of CABG today for initial wound care consult regarding right heel wound which has been with patient for several months and being followed by podiatry.  Insult appears to have occurred  when patient stepped on a nail several months ago.  Patient follows with podiatry for diabetic footcare as well as wound care.  Was most recently seen yesterday at their office.  Per chart review, patient also does follow with vascular surgery following the recent bypass he had on 11/16/2023..  This was done with CryoVein and it was recommended that wound care would be optimized quickly for a short period of time due to short patency of the CryoVein conduits.  Patient has next follow-up appointment later today with vascular surgery.     MRI R foot 10/26/23:    No evidence of osteomyelitis.   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          The following portions of the patient's history were reviewed and updated as appropriate: allergies, current medications, past family history, past medical history, past social history, past surgical history, and problem list.    Review of Systems   Constitutional:  Negative for chills, diaphoresis and fever.   Skin:  Positive for wound.   All other systems reviewed and are negative.        Objective:       Wound 12/21/23 Diabetic Ulcer Ankle  Posterior;Right (Active)   Wound Image Images linked 01/25/24 0956   Wound Description Pale;Pink;Slough;White;Yellow (non-granulating tissue) 01/25/24 0956   Clara-wound Assessment Maceration;White 01/25/24 0956   Wound Length (cm) 0.5 cm 01/25/24 0956   Wound Width (cm) 0.5 cm 01/25/24 0956   Wound Depth (cm) 0.2 cm 01/25/24 0956   Wound Surface Area (cm^2) 0.25 cm^2 01/25/24 0956   Wound Volume (cm^3) 0.05 cm^3 01/25/24 0956   Calculated Wound Volume (cm^3) 0.05 cm^3 01/25/24 0956   Change in Wound Size % 0 01/25/24 0956   Drainage Amount Small 01/25/24 0956   Drainage Description Yellow;Serous 01/25/24 0956   Non-staged Wound Description Full thickness 01/25/24 0956   Dressing Status Intact;New drainage (upon arrival) 01/25/24 0956       /67   Pulse 59   Temp (!) 96.8 °F (36 °C) (Temporal)     Physical Exam  Vitals reviewed.   Constitutional:       Appearance: Normal appearance.   HENT:      Head: Normocephalic and atraumatic.   Eyes:      Extraocular Movements: Extraocular movements intact.   Pulmonary:      Effort: Pulmonary effort is normal.   Musculoskeletal:      Cervical back: Neck supple.   Skin:     Comments: Posterior ankle wound slightly deeper than last exam.  Periwound maceration noted.  Healthy wound bed.  No signs of infection.   Neurological:      Mental Status: He is alert.   Psychiatric:         Mood and Affect: Mood normal.           Wound Instructions:  Orders Placed This Encounter   Procedures    Wound cleansing and dressings Diabetic Ulcer Posterior;Right Ankle     Right posterior heel wound:    Wash your hands with soap and water.  Remove old dressing, discard into plastic bag and place in trash.  Cleanse the wound with soap and water prior to applying a clean dressing. Do not use tissue or cotton balls. Do not scrub the wound. Pat dry using gauze.  Shower yes   Apply Dermagran to the foot wound.  Cover with gauze  Secure with Medipore tape   Change dressing every other day.    This  "was done today.    You may also purchase Silicon tape to keep gauze in place.    Please arrange for someone to drive you from now on so that we may apply a bulky offloading dressing and place you in a surgical shoe.     Standing Status:   Future     Standing Expiration Date:   1/25/2025    Debridement     This order was created via procedure documentation        Diagnosis ICD-10-CM Associated Orders   1. Diabetic ulcer of ankle ([facility])  E11.622 lidocaine (XYLOCAINE) 4 % topical solution 5 mL    L97.309 Wound cleansing and dressings Diabetic Ulcer Posterior;Right Ankle     Debridement      2. Type 2 diabetes mellitus with diabetic peripheral angiopathy without gangrene, with long-term current use of insulin ([facility])  E11.51 lidocaine (XYLOCAINE) 4 % topical solution 5 mL    Z79.4 Wound cleansing and dressings Diabetic Ulcer Posterior;Right Ankle      3. Peripheral artery disease ([facility])  I73.9 lidocaine (XYLOCAINE) 4 % topical solution 5 mL     Wound cleansing and dressings Diabetic Ulcer Posterior;Right Ankle          --  David Frye MD    \"This note has been constructed using a voice recognition system. Therefore there may be syntax, spelling, and/or grammatical errors. Occasional wrong word or \"sound alike\" substitutions may have occurred due to the inherent limitations of voice recognition software. Read the chart carefully and recognize, using context, where substitutions have occurred. Please call if you have any questions.\"     "

## 2024-01-25 NOTE — PATIENT INSTRUCTIONS
Orders Placed This Encounter   Procedures    Wound cleansing and dressings Diabetic Ulcer Posterior;Right Ankle     Right posterior heel wound:    Wash your hands with soap and water.  Remove old dressing, discard into plastic bag and place in trash.  Cleanse the wound with soap and water prior to applying a clean dressing. Do not use tissue or cotton balls. Do not scrub the wound. Pat dry using gauze.  Shower yes   Apply Dermagran to the foot wound.  Cover with gauze  Secure with Medipore tape   Change dressing every other day.    This was done today.    You may also purchase Silicon tape to keep gauze in place.    Please arrange for someone to drive you from now on so that we may apply a bulky offloading dressing and place you in a surgical shoe.     Standing Status:   Future     Standing Expiration Date:   1/25/2025

## 2024-01-29 ENCOUNTER — APPOINTMENT (OUTPATIENT)
Dept: LAB | Facility: CLINIC | Age: 79
End: 2024-01-29
Payer: COMMERCIAL

## 2024-01-29 DIAGNOSIS — D64.9 SYMPTOMATIC ANEMIA: ICD-10-CM

## 2024-01-29 DIAGNOSIS — D50.0 IRON DEFICIENCY ANEMIA DUE TO CHRONIC BLOOD LOSS: ICD-10-CM

## 2024-01-29 DIAGNOSIS — K92.1 MELENA: ICD-10-CM

## 2024-01-29 DIAGNOSIS — K92.2 GI BLEED: ICD-10-CM

## 2024-01-29 LAB
BASOPHILS # BLD AUTO: 0.03 THOUSANDS/ÂΜL (ref 0–0.1)
BASOPHILS NFR BLD AUTO: 0 % (ref 0–1)
EOSINOPHIL # BLD AUTO: 0.23 THOUSAND/ÂΜL (ref 0–0.61)
EOSINOPHIL NFR BLD AUTO: 3 % (ref 0–6)
ERYTHROCYTE [DISTWIDTH] IN BLOOD BY AUTOMATED COUNT: 15.5 % (ref 11.6–15.1)
HCT VFR BLD AUTO: 31.8 % (ref 36.5–49.3)
HGB BLD-MCNC: 9 G/DL (ref 12–17)
IMM GRANULOCYTES # BLD AUTO: 0.03 THOUSAND/UL (ref 0–0.2)
IMM GRANULOCYTES NFR BLD AUTO: 0 % (ref 0–2)
LYMPHOCYTES # BLD AUTO: 1.44 THOUSANDS/ÂΜL (ref 0.6–4.47)
LYMPHOCYTES NFR BLD AUTO: 17 % (ref 14–44)
MCH RBC QN AUTO: 22.6 PG (ref 26.8–34.3)
MCHC RBC AUTO-ENTMCNC: 28.3 G/DL (ref 31.4–37.4)
MCV RBC AUTO: 80 FL (ref 82–98)
MONOCYTES # BLD AUTO: 0.53 THOUSAND/ÂΜL (ref 0.17–1.22)
MONOCYTES NFR BLD AUTO: 6 % (ref 4–12)
NEUTROPHILS # BLD AUTO: 6.09 THOUSANDS/ÂΜL (ref 1.85–7.62)
NEUTS SEG NFR BLD AUTO: 74 % (ref 43–75)
NRBC BLD AUTO-RTO: 0 /100 WBCS
PLATELET # BLD AUTO: 325 THOUSANDS/UL (ref 149–390)
PMV BLD AUTO: 11.1 FL (ref 8.9–12.7)
RBC # BLD AUTO: 3.99 MILLION/UL (ref 3.88–5.62)
WBC # BLD AUTO: 8.35 THOUSAND/UL (ref 4.31–10.16)

## 2024-01-29 PROCEDURE — 36415 COLL VENOUS BLD VENIPUNCTURE: CPT

## 2024-01-29 PROCEDURE — 85025 COMPLETE CBC W/AUTO DIFF WBC: CPT

## 2024-01-30 ENCOUNTER — TELEPHONE (OUTPATIENT)
Age: 79
End: 2024-01-30

## 2024-01-30 NOTE — TELEPHONE ENCOUNTER
Patient requesting a call back to discuss test results.       Ordering Provider: Lombardi  Date Completed: 01/29/2024    Lab [x]  MRI []  X-Ray []  CT []  Colonoscopy []  EGD []  Biopsy []  Other []

## 2024-01-30 NOTE — TELEPHONE ENCOUNTER
Discussed result with pt and we will switch him to an ireon pill daily from every other day and he will redraw his CBC.  Pt is seeing gi on thursday

## 2024-02-01 ENCOUNTER — TELEPHONE (OUTPATIENT)
Dept: GASTROENTEROLOGY | Facility: CLINIC | Age: 79
End: 2024-02-01

## 2024-02-01 ENCOUNTER — OFFICE VISIT (OUTPATIENT)
Dept: WOUND CARE | Facility: HOSPITAL | Age: 79
End: 2024-02-01
Payer: COMMERCIAL

## 2024-02-01 ENCOUNTER — OFFICE VISIT (OUTPATIENT)
Dept: GASTROENTEROLOGY | Facility: CLINIC | Age: 79
End: 2024-02-01
Payer: COMMERCIAL

## 2024-02-01 VITALS
HEART RATE: 63 BPM | WEIGHT: 232.4 LBS | BODY MASS INDEX: 29.82 KG/M2 | HEIGHT: 74 IN | DIASTOLIC BLOOD PRESSURE: 65 MMHG | SYSTOLIC BLOOD PRESSURE: 128 MMHG

## 2024-02-01 VITALS
DIASTOLIC BLOOD PRESSURE: 79 MMHG | SYSTOLIC BLOOD PRESSURE: 186 MMHG | RESPIRATION RATE: 18 BRPM | HEART RATE: 61 BPM | TEMPERATURE: 97.5 F

## 2024-02-01 DIAGNOSIS — I73.9 PERIPHERAL ARTERY DISEASE (HCC): ICD-10-CM

## 2024-02-01 DIAGNOSIS — E11.51 TYPE 2 DIABETES MELLITUS WITH DIABETIC PERIPHERAL ANGIOPATHY WITHOUT GANGRENE, WITH LONG-TERM CURRENT USE OF INSULIN (HCC): ICD-10-CM

## 2024-02-01 DIAGNOSIS — D62 ACUTE BLOOD LOSS ANEMIA: ICD-10-CM

## 2024-02-01 DIAGNOSIS — E11.622 DIABETIC ULCER OF ANKLE (HCC): Primary | ICD-10-CM

## 2024-02-01 DIAGNOSIS — K26.9 DUODENAL ULCER: Primary | ICD-10-CM

## 2024-02-01 DIAGNOSIS — Z79.4 TYPE 2 DIABETES MELLITUS WITH DIABETIC PERIPHERAL ANGIOPATHY WITHOUT GANGRENE, WITH LONG-TERM CURRENT USE OF INSULIN (HCC): ICD-10-CM

## 2024-02-01 DIAGNOSIS — L97.309 DIABETIC ULCER OF ANKLE (HCC): Primary | ICD-10-CM

## 2024-02-01 DIAGNOSIS — K92.1 MELENA: ICD-10-CM

## 2024-02-01 DIAGNOSIS — I25.10 CORONARY ARTERY DISEASE INVOLVING NATIVE CORONARY ARTERY OF NATIVE HEART WITHOUT ANGINA PECTORIS: ICD-10-CM

## 2024-02-01 PROCEDURE — 97597 DBRDMT OPN WND 1ST 20 CM/<: CPT | Performed by: STUDENT IN AN ORGANIZED HEALTH CARE EDUCATION/TRAINING PROGRAM

## 2024-02-01 PROCEDURE — 99213 OFFICE O/P EST LOW 20 MIN: CPT | Performed by: PHYSICIAN ASSISTANT

## 2024-02-01 RX ORDER — LIDOCAINE HYDROCHLORIDE 40 MG/ML
5 SOLUTION TOPICAL ONCE
Status: COMPLETED | OUTPATIENT
Start: 2024-02-01 | End: 2024-02-01

## 2024-02-01 RX ORDER — CLOPIDOGREL BISULFATE 75 MG/1
75 TABLET ORAL DAILY
Qty: 90 TABLET | Refills: 3 | Status: SHIPPED | OUTPATIENT
Start: 2024-02-01

## 2024-02-01 RX ADMIN — LIDOCAINE HYDROCHLORIDE 5 ML: 40 SOLUTION TOPICAL at 15:19

## 2024-02-01 NOTE — PATIENT INSTRUCTIONS
Orders Placed This Encounter   Procedures    Wound cleansing and dressings     Right posterior heel wound:     Wash your hands with soap and water.  Remove old dressing, discard into plastic bag and place in trash.  Cleanse the wound with soap and water prior to applying a clean dressing. Do not use tissue or cotton balls. Do not scrub the wound. Pat dry using gauze.  Shower yes   Apply Dermagran to the foot wound.  Cover with gauze  Secure with Medipore tape   Change dressing every other day.     This was done today.     You may also purchase Silicon tape to keep gauze in place.     Please arrange for someone to drive you from now on so that we may apply a bulky offloading dressing and place you in a surgical shoe.     Standing Status:   Future     Standing Expiration Date:   2/1/2025

## 2024-02-01 NOTE — PROGRESS NOTES
[facility] - Outpatient Progress Note  Thomas Horne 79 y.o. male MRN: [id number]  Encounter: [id number]    Assessment and Plan    1. Duodenal ulcer  -Clean-based noted on December 26, 2023 EGD  -Gastric biopsy negative for H. Pylori  -Decrease pantoprazole from 40 mg twice daily to 40 mg once daily    2. Acute blood loss anemia  -Second likely to duodenal ulcer  -Hemoglobin on January 29, 2024 9.0  - Plan colonoscopy due to persistent anemia, personal history of polyps and family history of colon cancer.    3. Melena  -Second to duodenal ulcer  -Resolved at this time      --------------------------------------------------------------------------------------------------------------------    Chief Complaint: Hospital follow-up    HPI: Thomas Horne is a 79 y.o. male new to me with past medical history of hyperlipidemia, hypertension, coronary artery disease, cardiac stenting, history of CABG, abdominal aortic aneurysm repair, prostatectomy, history of prostate cancer, diabetes type 2, peripheral artery disease with endovascular stenting, congestive heart failure with preserved ejection fraction, chronic kidney disease, duodenal ulcer and anemia of chronic kidney disease who presents today for follow up for hospitalization at the end of December 2023.  He was seen in consult on December 26, 2023 for symptomatic anemia and melena.  He underwent an EGD that day showing clean-based duodenal ulcer and has been on pantoprazole 40 mg twice daily since then.  Gastric biopsy and duodenal biopsies negative for H. pylori.  Hemoglobin has remained stable 9.0 to 9.2 to 9.0.  He denies any further melena.    Feels very fatigued.  Gets SOB with exertion.  No blood stools.   No significant weight loss.  Appetite is good.    No pacemaker or defibrillator implanted.   No chronic kidney disease or solitary kidney.  Not on any supplemental oxygen at night.  Not on any antiplatelet or  anticoagulants.      Endoscopy History:  EGD -December 26, 2023 done for symptomatic anemia and melena with notation of clean-based duodenal ulcer.  Biopsy negative for H. pylori.  Colonoscopy - Likely 5-6 years ago.  He did have previous polyps.  Brother and sister with history of colon cancer.    Review of Systems:   General: negative for fatigue, fever, night sweats or unexpected weight loss  Psychological: negative for anxiety or depression  Ophthalmic: negative for blurry vision or scleral icterus  ENT: negative for headaches, sore throat or dysphagia  Hematological and Lymphatic: negative for pallor or swollen lymph nodes  Respiratory: negative for cough, shortness of breath or wheezing  Cardiovascular: negative for chest pain, edema or murmur  Gastrointestinal: as mentioned in HPI  Genito-Urinary: negative for dysuria or incontinence  Musculoskeletal: negative for joint pain, joint stiffness or joint swelling  Dermatological: negative for pruritus, rash, or jaundice    Current Medications  Current Outpatient Medications   Medication Sig Dispense Refill   • acetaminophen (TYLENOL) 325 mg tablet Take 2 tablets (650 mg total) by mouth every 6 (six) hours as needed for mild pain  0   • amLODIPine (NORVASC) 10 mg tablet Take 0.5 tablets (5 mg total) by mouth daily 1 tablet 1   • aspirin 81 mg chewable tablet Chew 81 mg daily     • atorvastatin (LIPITOR) 40 mg tablet Take 1 tablet (40 mg total) by mouth daily (Patient taking differently: Take 40 mg by mouth every evening) 90 tablet 3   • cloNIDine (CATAPRES) 0.1 mg tablet take 1 tablet by mouth every 12 hours 180 tablet 1   • clopidogrel (PLAVIX) 75 mg tablet TAKE 1 TABLET BY MOUTH DAILY 90 tablet 3   • Empagliflozin 25 MG TABS Take 1 tablet (25 mg total) by mouth daily 90 tablet 1   • fenofibrate 160 MG tablet Take 1 tablet (160 mg total) by mouth daily 90 tablet 3   • ferrous sulfate 324 (65 Fe) mg Take 1 tablet (324 mg total) by mouth every other day (Patient  taking differently: Take 324 mg by mouth daily) 45 tablet 0   • gabapentin (NEURONTIN) 600 MG tablet TAKE 1 TABLET BY MOUTH  TWICE DAILY 180 tablet 3   • glimepiride (AMARYL) 2 mg tablet Take 1 tablet (2 mg total) by mouth 2 (two) times a day 180 tablet 3   • insulin glargine (LANTUS) 100 units/mL subcutaneous injection Inject 15 Units under the skin daily at bedtime 10 mL 0   • isosorbide mononitrate (IMDUR) 60 mg 24 hr tablet Take 1 tablet (60 mg total) by mouth daily 90 tablet 3   • metoprolol tartrate (LOPRESSOR) 50 mg tablet Take 1 tablet (50 mg total) by mouth every 12 (twelve) hours 180 tablet 3   • Multiple Vitamins-Minerals (MULTIVITAMIN MEN 50+ PO) Take by mouth daily     • nitroglycerin (NITROSTAT) 0.4 mg SL tablet Place 1 tablet (0.4 mg total) under the tongue every 5 (five) minutes as needed for chest pain 30 tablet 3   • Omega-3 Fatty Acids (fish oil) 1,000 mg Take 4,000 mg by mouth 2 (two) times a day     • ondansetron (ZOFRAN) 4 mg tablet Take 4 mg by mouth every 8 (eight) hours as needed for nausea or vomiting     • pantoprazole (PROTONIX) 40 mg tablet Take 1 tablet (40 mg total) by mouth 2 (two) times a day 180 tablet 0   • polyethylene glycol (GOLYTELY) 4000 mL solution Take 4,000 mL by mouth once for 1 dose 4000 mL 0   • ranolazine (RANEXA) 500 mg 12 hr tablet take 1 tablet by mouth twice a day 60 tablet 3   • sitaGLIPtin (JANUVIA) 100 mg tablet Take 1 tablet (100 mg total) by mouth daily 90 tablet 1   • torsemide (DEMADEX) 20 mg tablet Take 1 tablet (20 mg total) by mouth daily 90 tablet 3   • Blood Glucose Monitoring Suppl (OneTouch Verio Reflect) w/Device KIT Check blood sugars twice daily. Please substitute with appropriate alternative as covered by patient's insurance. Dx: E11.65 (Patient not taking: Reported on 2/1/2024) 1 kit 0   • Droplet Pen Needles 32G X 4 MM MISC USE EVERY EVENING (Patient not taking: Reported on 2/1/2024) 100 each 5   • Elastic Bandages & Supports (Medical Compression  Stockings) MISC Use daily Knee High 15-20mmHg (Patient not taking: Reported on 2/1/2024) 2 each 4   • glucose blood (OneTouch Verio) test strip TEST TWICE A DAY (Patient not taking: Reported on 2/1/2024) 200 strip 5   • OneTouch Delica Lancets 33G MISC Check blood sugars twice daily. Please substitute with appropriate alternative as covered by patient's insurance. Dx: E11.65 (Patient not taking: Reported on 2/1/2024) 200 each 3     No current facility-administered medications for this visit.       Past Medical History  Past Medical History:   Diagnosis Date   • Anemia    • CAD (coronary artery disease)    • Cancer (HCC)     prostate   • CHF (congestive heart failure) (HCC)    • Chronic kidney disease    • Diabetes mellitus (HCC)    • Duodenal ulcer    • Hyperlipidemia    • Hypertension    • Myocardial infarction (HCC)    • Neuropathy     Bilateral feet   • Sleep apnea     Could not tolerate CPAP       Past Surgical History  Past Surgical History:   Procedure Laterality Date   • ADENOIDECTOMY     • CARDIAC CATHETERIZATION Left 10/19/2022    Procedure: Cardiac Left Heart Cath;  Surgeon: Danielle Pereira MD;  Location: [facility];  Service: Cardiology   • CARDIAC SURGERY  2002    3 cardiac bypass then angioplasty 7/2020   • CHOLECYSTECTOMY     • COLONOSCOPY     • CORONARY ARTERY BYPASS GRAFT     • IR LOWER EXTREMITY ANGIOGRAM  11/1/2023   • MD BYPASS W/VEIN FEMORAL-POPLITEAL Right 11/16/2023    Procedure: BYPASS FEMORAL-POPLITEAL WITH CRYO VEIN, RIGHT FEMORAL ENDARTERECTOMY;  Surgeon: Vasquez Clark MD;  Location: [facility];  Service: Vascular   • MD SLCTV CATHJ 3RD+ ORD SLCTV ABDL PEL/LXTR BRNCH Right 11/1/2023    Procedure: ARTERIOGRAM Right lower extremity arteriogram with CO2 via right groin access;  Surgeon: Vasquez Clark MD;  Location: [facility];  Service: Vascular   • PROSTATE SURGERY     • TONSILLECTOMY         Past Social History   Social History     Socioeconomic History  "  • Marital status:      Spouse name: None   • Number of children: 2   • Years of education: None   • Highest education level: Some college, no degree   Occupational History   • None   Tobacco Use   • Smoking status: Former     Current packs/day: 0.00     Average packs/day: 1.5 packs/day for 33.0 years (49.5 ttl pk-yrs)     Types: Cigarettes     Start date:      Quit date:      Years since quittin.1   • Smokeless tobacco: Never   Vaping Use   • Vaping status: Never Used   Substance and Sexual Activity   • Alcohol use: Yes     Alcohol/week: 14.0 standard drinks of alcohol     Types: 14 Cans of beer per week     Comment: 1 -2 daily   • Drug use: Never   • Sexual activity: None   Other Topics Concern   • None   Social History Narrative   • None     Social Determinants of Health     Financial Resource Strain: Low Risk  (10/31/2023)    Overall Financial Resource Strain (CARDIA)    • Difficulty of Paying Living Expenses: Not hard at all   Food Insecurity: Not on file   Transportation Needs: No Transportation Needs (10/31/2023)    PRAPARE - Transportation    • Lack of Transportation (Medical): No    • Lack of Transportation (Non-Medical): No   Physical Activity: Not on file   Stress: Not on file   Social Connections: Not on file   Intimate Partner Violence: Not on file   Housing Stability: Not on file       Vital Signs  Vitals:    24 1045   BP: 128/65   BP Location: Right arm   Patient Position: Sitting   Cuff Size: Standard   Pulse: 63   Weight: 105 kg (232 lb 6.4 oz)   Height: 6' 2\" (1.88 m)       Physical Exam:  General appearance: alert, cooperative, no distress  HEENT: normocephalic, anicteric, no eye erythema or discharge, no oropharyngeal thrush  Neck: supple  Lungs: CTA b/l, no rales, rhonchi, or wheezing, unlabored respirations  Heart: RRR, no murmur, rubs, or gallops  Abdomen: soft, non-tender, non-distended, normal bowel sounds, no masses or organomegaly  Rectal: deferred  Extremities: " no cyanosis, clubbing, or edema  Musculoskeletal: normal gait  Skin: color and texture normal, no jaundice, no rashes or lesions  Psychiatric: alert and oriented, normal affect and behavior                                                          Tj Paulino PA-C

## 2024-02-01 NOTE — PROGRESS NOTES
"Patient ID: Thomas Horne is a 79 y.o. male Date of Birth 1945     Chief Complaint  Chief Complaint   Patient presents with    Follow Up Wound Care Visit       Allergies  Lisinopril    Assessment:     Diagnoses and all orders for this visit:    Diabetic ulcer of ankle (HCC)  -     Wound cleansing and dressings; Future  -     lidocaine (XYLOCAINE) 4 % topical solution 5 mL  -     Debridement    Type 2 diabetes mellitus with diabetic peripheral angiopathy without gangrene, with long-term current use of insulin (HCC)  -     Wound cleansing and dressings; Future  -     lidocaine (XYLOCAINE) 4 % topical solution 5 mL    Peripheral artery disease (HCC)  -     Wound cleansing and dressings; Future  -     lidocaine (XYLOCAINE) 4 % topical solution 5 mL              Debridement   Wound 12/21/23 Diabetic Ulcer Ankle Posterior;Right    Universal Protocol:  Consent: Verbal consent obtained.  Risks and benefits: risks, benefits and alternatives were discussed  Consent given by: patient  Time out: Immediately prior to procedure a \"time out\" was called to verify the correct patient, procedure, equipment, support staff and site/side marked as required.  Patient identity confirmed: verbally with patient    Debridement Details  Performed by: physician  Debridement type: selective  Pain control: lidocaine 4%      Post-debridement measurements  Length (cm): 0.4  Width (cm): 0.5  Depth (cm): 0.2  Percent debrided: 90%  Surface Area (cm^2): 0.2  Area Debrided (cm^2): 0.18  Volume (cm^3): 0.04    Devitalized tissue debrided: biofilm and exudate  Instrument(s) utilized: curette  Bleeding: small  Hemostasis obtained with: pressure  Procedural pain (0-10): 1  Post-procedural pain: 0   Response to treatment: procedure was tolerated well        Plan:  It was a pleasure to see Thomas Horne for wound care follow up today  Selective debridement performed today as above  Wound is improving   Continue plan of care as noted below with " dermagran  No signs or symptoms of infection today. Patient understands that if any signs of infection start (such as increased redness, drainage, pain, fever, chills, diaphoresis), they should call our office or proceed to the ER or Urgent Care.  Patient should continue a high protein diet to facilitate wound healing  Patient is advised to not submerge wound or leave wound open to air.  Follow up in 2 weeks  Given the multi-factorial nature of wound care, additional time was taken to review patient's treatment plan with other specialties and most recent pertinent lab work and imaging.   All plans of care discussed with patient at bedside who verbalized understanding with treatment plan.       Wound 12/21/23 Diabetic Ulcer Ankle Posterior;Right (Active)   Wound Image Images linked 02/01/24 1516   Wound Description Pale;Pink;Slough;White;Yellow 02/01/24 1516   Clara-wound Assessment Callus 02/01/24 1516   Wound Length (cm) 0.4 cm 02/01/24 1516   Wound Width (cm) 0.5 cm 02/01/24 1516   Wound Depth (cm) 0.2 cm 02/01/24 1516   Wound Surface Area (cm^2) 0.2 cm^2 02/01/24 1516   Wound Volume (cm^3) 0.04 cm^3 02/01/24 1516   Calculated Wound Volume (cm^3) 0.04 cm^3 02/01/24 1516   Change in Wound Size % 20 02/01/24 1516   Drainage Amount Scant 02/01/24 1516   Drainage Description Yellow;Serous 02/01/24 1516   Non-staged Wound Description Full thickness 02/01/24 1516       Wound 12/21/23 Diabetic Ulcer Ankle Posterior;Right (Active)   Date First Assessed/Time First Assessed: 12/21/23 0954   Primary Wound Type: Diabetic Ulcer  Location: Ankle  Wound Location Orientation: Posterior;Right  Wound Description (Comments): MACDONALD 2       [REMOVED] Wound 11/16/23   Heel Anterior;Right (Removed)   Resolved Date: 12/21/23  Date First Assessed/Time First Assessed: 11/16/23 0700   Primary Wound Type: (c)   Traumatic Wound Type: (c)   Location: Heel  Wound Location Orientation: Anterior;Right  Dressing Status: Intact;Dry;Clean  Wound  Outcome: (c) O...       [REMOVED] Wound 11/16/23 Leg Right (Removed)   Resolved Date: 01/04/24  Date First Assessed/Time First Assessed: 11/16/23 1440   Location: Leg  Wound Location Orientation: Right  Incision's 1st Dressing: KERLIX (x0), DRESSING MEPILEX AG BORDER POST-OP 4 X 8 IN (x1), ACE WRAP 6 IN STERILE (x0)  Wou...       [REMOVED] Wound 11/16/23 Groin Right (Removed)   Resolved Date: 01/04/24  Date First Assessed/Time First Assessed: 11/16/23 1440   Location: Groin  Wound Location Orientation: Right  Incision's 1st Dressing: ADHESIVE SKIN HIGH VISCOSITY EXOFIN 1ML (x0), DRESSING MEPILEX AG BORDER POST-OP 4 X 6 IN (x...       [REMOVED] Wound 12/21/23 Diabetic Ulcer Foot Right;Plantar (Removed)   Resolved Date: 01/18/24  Date First Assessed/Time First Assessed: 12/21/23 0948   Primary Wound Type: Diabetic Ulcer  Location: Foot  Wound Location Orientation: Right;Plantar  Wound Description (Comments): MACDONALD 2       Subjective:      .    2/2/24, 1/25/24: Apply dermagran. Denies any symptoms of infection     1/18/24: Applying wound dressings as directed.  Happy with wound healing.     1/4/24: Since his last visit patient was briefly hospitalized for symptomatic anemia.  DAPT was held at that time and restarted thereafter. Has been applying dermagran as recommended. Happy with wound healing. No symptoms of infection.     12/21/23: Hayden - Thomas is a pleasant 78-year-old male with a past medical history of type 2 diabetes mellitus most recent hemoglobin A1c 7.0% 2 months ago, diabetic polyneuropathy, HfpEF G2DD, peripheral arterial disease with history of revascularization most recently mild peroneal bypass to the right lower extremity., CKD, coronary artery disease history of CABG today for initial wound care consult regarding right heel wound which has been with patient for several months and being followed by podiatry.  Insult appears to have occurred  when patient stepped on a nail several months ago.   Patient follows with podiatry for diabetic footcare as well as wound care.  Was most recently seen yesterday at their office.  Per chart review, patient also does follow with vascular surgery following the recent bypass he had on 11/16/2023..  This was done with CryoVein and it was recommended that wound care would be optimized quickly for a short period of time due to short patency of the CryoVein conduits.  Patient has next follow-up appointment later today with vascular surgery.     MRI R foot 10/26/23:    No evidence of osteomyelitis.   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          The following portions of the patient's history were reviewed and updated as appropriate: allergies, current medications, past family history, past medical history, past social history, past surgical history, and problem list.    Review of Systems   Constitutional:  Negative for chills, diaphoresis and fever.   Skin:  Positive for wound.   All other systems reviewed and are negative.        Objective:       Wound 12/21/23 Diabetic Ulcer Ankle Posterior;Right (Active)   Wound Image Images linked 02/01/24 1516   Wound Description Pale;Pink;Slough;White;Yellow 02/01/24 1516   Clara-wound Assessment Callus 02/01/24 1516   Wound Length (cm) 0.4 cm 02/01/24 1516   Wound Width (cm) 0.5 cm 02/01/24 1516   Wound Depth (cm) 0.2 cm 02/01/24 1516   Wound Surface Area (cm^2) 0.2 cm^2 02/01/24 1516   Wound Volume (cm^3) 0.04 cm^3 02/01/24 1516   Calculated Wound Volume (cm^3) 0.04 cm^3 02/01/24 1516   Change in Wound Size % 20 02/01/24 1516   Drainage Amount Scant 02/01/24 1516   Drainage Description Yellow;Serous 02/01/24 1516   Non-staged Wound Description Full thickness 02/01/24 1516       BP (!) 186/79   Pulse 61   Temp 97.5 °F (36.4 °C)   Resp 18     Physical Exam  Vitals reviewed.   Constitutional:       Appearance: Normal appearance.   HENT:      Head: Normocephalic and atraumatic.   Eyes:       "Extraocular Movements: Extraocular movements intact.   Pulmonary:      Effort: Pulmonary effort is normal.   Musculoskeletal:      Cervical back: Neck supple.   Skin:     Comments: Ankle wound smaller than last exam.  Healthy wound bed.     Neurological:      Mental Status: He is alert.   Psychiatric:         Mood and Affect: Mood normal.               Wound Instructions:  Orders Placed This Encounter   Procedures    Wound cleansing and dressings     Right posterior heel wound:     Wash your hands with soap and water.  Remove old dressing, discard into plastic bag and place in trash.  Cleanse the wound with soap and water prior to applying a clean dressing. Do not use tissue or cotton balls. Do not scrub the wound. Pat dry using gauze.  Shower yes   Apply Dermagran to the foot wound.  Cover with gauze  Secure with Medipore tape   Change dressing every other day.     This was done today.     You may also purchase Silicon tape to keep gauze in place.     Please arrange for someone to drive you from now on so that we may apply a bulky offloading dressing and place you in a surgical shoe.     Standing Status:   Future     Standing Expiration Date:   2/1/2025    Debridement     This order was created via procedure documentation        Diagnosis ICD-10-CM Associated Orders   1. Diabetic ulcer of ankle ([facility])  E11.622 Wound cleansing and dressings    L97.309 lidocaine (XYLOCAINE) 4 % topical solution 5 mL     Debridement      2. Type 2 diabetes mellitus with diabetic peripheral angiopathy without gangrene, with long-term current use of insulin ([facility])  E11.51 Wound cleansing and dressings    Z79.4 lidocaine (XYLOCAINE) 4 % topical solution 5 mL      3. Peripheral artery disease ([facility])  I73.9 Wound cleansing and dressings     lidocaine (XYLOCAINE) 4 % topical solution 5 mL          --  David Frye MD    \"This note has been constructed using a voice recognition system. Therefore there may be syntax, spelling, and/or " "grammatical errors. Occasional wrong word or \"sound alike\" substitutions may have occurred due to the inherent limitations of voice recognition software. Read the chart carefully and recognize, using context, where substitutions have occurred. Please call if you have any questions.\"     "

## 2024-02-01 NOTE — TELEPHONE ENCOUNTER
Our mutual patient is scheduled for procedure: colonoscopy    On: February 14 , 2024     With: Dr. Fran Terrazas MD    He/She is taking the following blood thinner: Plavix (Clopidogrel)    Can this be stopped  5 days prior to the procedure    Physician Approving clearance: Dr Pereira

## 2024-02-08 ENCOUNTER — TELEPHONE (OUTPATIENT)
Dept: GASTROENTEROLOGY | Facility: CLINIC | Age: 79
End: 2024-02-08

## 2024-02-08 NOTE — TELEPHONE ENCOUNTER
Spoke with pt confirming his 2/14 colonoscopy with Dr. Terrazas. He has a  and his prep. He will be called day before with arrival time. He will hold his Plavix 5 days and is already holding his iron for 7 days. He will hold his Jardiance 4 days and take half of his glimepiride evening prior.

## 2024-02-14 ENCOUNTER — ANESTHESIA EVENT (OUTPATIENT)
Dept: PERIOP | Facility: HOSPITAL | Age: 79
End: 2024-02-14

## 2024-02-14 ENCOUNTER — ANESTHESIA (OUTPATIENT)
Dept: PERIOP | Facility: HOSPITAL | Age: 79
End: 2024-02-14

## 2024-02-14 ENCOUNTER — HOSPITAL ENCOUNTER (OUTPATIENT)
Dept: PERIOP | Facility: HOSPITAL | Age: 79
Setting detail: OUTPATIENT SURGERY
Discharge: HOME | End: 2024-02-14
Admitting: INTERNAL MEDICINE
Payer: COMMERCIAL

## 2024-02-14 VITALS
TEMPERATURE: 97.4 F | SYSTOLIC BLOOD PRESSURE: 135 MMHG | WEIGHT: 230 LBS | HEIGHT: 74 IN | DIASTOLIC BLOOD PRESSURE: 61 MMHG | BODY MASS INDEX: 29.52 KG/M2 | RESPIRATION RATE: 18 BRPM | OXYGEN SATURATION: 96 % | HEART RATE: 58 BPM

## 2024-02-14 DIAGNOSIS — K92.2 GI BLEED: ICD-10-CM

## 2024-02-14 DIAGNOSIS — D62 ACUTE BLOOD LOSS ANEMIA: ICD-10-CM

## 2024-02-14 DIAGNOSIS — D50.0 IRON DEFICIENCY ANEMIA DUE TO CHRONIC BLOOD LOSS: ICD-10-CM

## 2024-02-14 DIAGNOSIS — K92.1 MELENA: ICD-10-CM

## 2024-02-14 DIAGNOSIS — D64.9 SYMPTOMATIC ANEMIA: ICD-10-CM

## 2024-02-14 LAB
GLUCOSE SERPL-MCNC: 83 MG/DL (ref 65–140)
GLUCOSE SERPL-MCNC: 86 MG/DL (ref 65–140)

## 2024-02-14 PROCEDURE — 45385 COLONOSCOPY W/LESION REMOVAL: CPT | Performed by: INTERNAL MEDICINE

## 2024-02-14 PROCEDURE — 88305 TISSUE EXAM BY PATHOLOGIST: CPT | Performed by: PATHOLOGY

## 2024-02-14 PROCEDURE — 82948 REAGENT STRIP/BLOOD GLUCOSE: CPT

## 2024-02-14 RX ORDER — FERROUS SULFATE 324(65)MG
324 TABLET, DELAYED RELEASE (ENTERIC COATED) ORAL DAILY
Qty: 90 TABLET | Refills: 0 | Status: SHIPPED | OUTPATIENT
Start: 2024-02-14 | End: 2024-05-14

## 2024-02-14 RX ORDER — PROPOFOL 10 MG/ML
INJECTION, EMULSION INTRAVENOUS CONTINUOUS PRN
Status: DISCONTINUED | OUTPATIENT
Start: 2024-02-14 | End: 2024-02-14

## 2024-02-14 RX ORDER — PROPOFOL 10 MG/ML
INJECTION, EMULSION INTRAVENOUS AS NEEDED
Status: DISCONTINUED | OUTPATIENT
Start: 2024-02-14 | End: 2024-02-14

## 2024-02-14 RX ORDER — SODIUM CHLORIDE, SODIUM LACTATE, POTASSIUM CHLORIDE, CALCIUM CHLORIDE 600; 310; 30; 20 MG/100ML; MG/100ML; MG/100ML; MG/100ML
100 INJECTION, SOLUTION INTRAVENOUS CONTINUOUS
Status: DISCONTINUED | OUTPATIENT
Start: 2024-02-14 | End: 2024-02-18 | Stop reason: HOSPADM

## 2024-02-14 RX ORDER — LIDOCAINE HYDROCHLORIDE 10 MG/ML
INJECTION, SOLUTION EPIDURAL; INFILTRATION; INTRACAUDAL; PERINEURAL AS NEEDED
Status: DISCONTINUED | OUTPATIENT
Start: 2024-02-14 | End: 2024-02-14

## 2024-02-14 RX ADMIN — LIDOCAINE HYDROCHLORIDE 50 MG: 10 INJECTION, SOLUTION EPIDURAL; INFILTRATION; INTRACAUDAL; PERINEURAL at 11:13

## 2024-02-14 RX ADMIN — PROPOFOL 100 MCG/KG/MIN: 10 INJECTION, EMULSION INTRAVENOUS at 11:13

## 2024-02-14 RX ADMIN — SODIUM CHLORIDE, SODIUM LACTATE, POTASSIUM CHLORIDE, AND CALCIUM CHLORIDE 100 ML/HR: .6; .31; .03; .02 INJECTION, SOLUTION INTRAVENOUS at 10:10

## 2024-02-14 RX ADMIN — PROPOFOL 100 MG: 10 INJECTION, EMULSION INTRAVENOUS at 11:13

## 2024-02-14 NOTE — ANESTHESIA POSTPROCEDURE EVALUATION
Post-Op Assessment Note    CV Status:  Stable  Pain Score: 0    Pain management: adequate       Mental Status:  Alert and awake   Hydration Status:  Stable   PONV Controlled:  None   Airway Patency:  Patent and adequate     Post Op Vitals Reviewed: Yes      Staff: CRNA               BP      Temp      Pulse     Resp      SpO2

## 2024-02-14 NOTE — ANESTHESIA PREPROCEDURE EVALUATION
Procedure:  COLONOSCOPY    Relevant Problems   CARDIO   (+) Coronary artery disease involving native coronary artery of native heart without angina pectoris   (+) Frequent PVCs   (+) Mild aortic stenosis   (+) Mixed hyperlipidemia   (+) Peripheral artery disease (HCC)   (+) Primary hypertension   (+) Stable angina pectoris   (+) Type 2 diabetes mellitus with diabetic peripheral angiopathy without gangrene, with long-term current use of insulin (HCC)      ENDO   (+) Type 2 diabetes mellitus with diabetic peripheral angiopathy without gangrene, with long-term current use of insulin (HCC)   (+) Type 2 diabetes mellitus with diabetic polyneuropathy, with long-term current use of insulin (HCC)      GI/HEPATIC   (+) Duodenal ulcer   (+) Multiple gastric ulcers      /RENAL   (+) Acute kidney injury superimposed on chronic kidney disease    (+) Chronic kidney disease-mineral and bone disorder      HEMATOLOGY   (+) Iron deficiency anemia due to chronic blood loss      NEURO/PSYCH   (+) Stable angina pectoris      Cardiovascular and Mediastinum   (+) Chronic HFpEF/Moderate pHTN (HFpEF) (HCC)      Other   (+) History of endovascular stent graft for abdominal aortic aneurysm (AAA)   (+) Hx of CABG   (+) S/P angioplasty with stent      Denies recent fever, cough or other symptom of upper respiratory tract infection.    Confirmed NPO appropriate    Physical Exam    Airway    Mallampati score: III  TM Distance: >3 FB  Neck ROM: full     Dental        Cardiovascular      Pulmonary      Other Findings        6/17/23 TTE: EF 50%. Mild global hypokinesis. Grade II diastolic dysfunction. Normal right ventricular systolic function. Mild to moderate AS. Moderate to severe pulmonary hypertension.    Anesthesia Plan  ASA Score- 3     Anesthesia Type- IV sedation with anesthesia with ASA Monitors.         Additional Monitors:     Airway Plan:     Comment: I discussed the risks and benefits of IV sedation anesthesia including the  possibility of the need to convert to general anesthesia and the potential risk of awareness.       Plan Factors-Exercise tolerance (METS): >4 METS.Exercise comment: Able to climb two flights of stairs at home without cardiopulmonary limitation.    Chart reviewed.   Existing labs reviewed.     Patient is not a current smoker.  Patient did not smoke on day of surgery.            Induction- intravenous.    Postoperative Plan-     Informed Consent- Anesthetic plan and risks discussed with patient.

## 2024-02-14 NOTE — H&P
History and Physical -  [facility]  Thomas Horne 79 y.o. male MRN: [id number]    HPI: Thomas Horne is a 79 y.o. year old male who presents with anemia      Review of Systems    Historical Information   Past Medical History:   Diagnosis Date    Anemia     CAD (coronary artery disease)     Cancer (HCC)     prostate    CHF (congestive heart failure) (HCC)     Chronic kidney disease     Diabetes mellitus (HCC)     Duodenal ulcer     Hyperlipidemia     Hypertension     Myocardial infarction (HCC)     Neuropathy     Bilateral feet    Sleep apnea     Could not tolerate CPAP     Past Surgical History:   Procedure Laterality Date    ADENOIDECTOMY      CARDIAC CATHETERIZATION Left 10/19/2022    Procedure: Cardiac Left Heart Cath;  Surgeon: Danielle Pereira MD;  Location: [facility];  Service: Cardiology    CARDIAC SURGERY  2002    3 cardiac bypass then angioplasty 7/2020    CHOLECYSTECTOMY      COLONOSCOPY      CORONARY ARTERY BYPASS GRAFT      IR LOWER EXTREMITY ANGIOGRAM  11/1/2023    [facility] BYPASS W/VEIN FEMORAL-POPLITEAL Right 11/16/2023    Procedure: BYPASS FEMORAL-POPLITEAL WITH CRYO VEIN, RIGHT FEMORAL ENDARTERECTOMY;  Surgeon: Vasquez Clark MD;  Location: [facility];  Service: Vascular    VA SLCTV CATHJ 3RD+ ORD SLCTV ABDL PEL/LXTR BRNCH Right 11/1/2023    Procedure: ARTERIOGRAM Right lower extremity arteriogram with CO2 via right groin access;  Surgeon: Vasquez Clark MD;  Location: [facility];  Service: Vascular    PROSTATE SURGERY      TONSILLECTOMY       Social History   Social History     Substance and Sexual Activity   Alcohol Use Yes    Alcohol/week: 14.0 standard drinks of alcohol    Types: 14 Cans of beer per week    Comment: 1 -2 daily     Social History     Substance and Sexual Activity   Drug Use Never     Social History     Tobacco Use   Smoking Status Former    Current packs/day: 0.00    Average packs/day: 1.5 packs/day for 33.0 years (49.5 ttl pk-yrs)  "   Types: Cigarettes    Start date:     Quit date:     Years since quittin.1   Smokeless Tobacco Never     Family History   Problem Relation Age of Onset    Diabetes Mother     Alcohol abuse Father     Mental illness Neg Hx        Meds/Allergies     (Not in a hospital admission)      Allergies   Allergen Reactions    Lisinopril Rash and Lip Swelling       Objective     /65   Pulse 57   Temp (!) 97.4 °F (36.3 °C) (Temporal)   Resp 18   Ht 6' 2\" (1.88 m)   Wt 104 kg (230 lb)   SpO2 98%   BMI 29.53 kg/m²       PHYSICAL EXAM    Gen: NAD  CV: RRR  CHEST: Clear  ABD: soft, NT/ND  EXT: no edema  Neuro: AAO      ASSESSMENT/PLAN:  This is a 79 y.o. year old male here for anemia      PLAN:   Procedure: colonoscopy      "

## 2024-02-15 ENCOUNTER — TELEPHONE (OUTPATIENT)
Dept: CARDIOLOGY CLINIC | Facility: CLINIC | Age: 79
End: 2024-02-15

## 2024-02-15 ENCOUNTER — TELEPHONE (OUTPATIENT)
Dept: FAMILY MEDICINE CLINIC | Facility: CLINIC | Age: 79
End: 2024-02-15

## 2024-02-15 ENCOUNTER — OFFICE VISIT (OUTPATIENT)
Dept: WOUND CARE | Facility: HOSPITAL | Age: 79
End: 2024-02-15
Payer: COMMERCIAL

## 2024-02-15 ENCOUNTER — OFFICE VISIT (OUTPATIENT)
Dept: VASCULAR SURGERY | Facility: CLINIC | Age: 79
End: 2024-02-15
Payer: COMMERCIAL

## 2024-02-15 VITALS
RESPIRATION RATE: 18 BRPM | DIASTOLIC BLOOD PRESSURE: 86 MMHG | HEART RATE: 77 BPM | TEMPERATURE: 98.1 F | SYSTOLIC BLOOD PRESSURE: 154 MMHG | OXYGEN SATURATION: 99 %

## 2024-02-15 VITALS
DIASTOLIC BLOOD PRESSURE: 62 MMHG | HEART RATE: 54 BPM | BODY MASS INDEX: 30.16 KG/M2 | WEIGHT: 235 LBS | SYSTOLIC BLOOD PRESSURE: 118 MMHG | HEIGHT: 74 IN

## 2024-02-15 DIAGNOSIS — L97.411 DIABETIC ULCER OF RIGHT MIDFOOT ASSOCIATED WITH DIABETES MELLITUS DUE TO UNDERLYING CONDITION, LIMITED TO BREAKDOWN OF SKIN (HCC): Primary | ICD-10-CM

## 2024-02-15 DIAGNOSIS — E08.621 DIABETIC ULCER OF RIGHT MIDFOOT ASSOCIATED WITH DIABETES MELLITUS DUE TO UNDERLYING CONDITION, LIMITED TO BREAKDOWN OF SKIN (HCC): Primary | ICD-10-CM

## 2024-02-15 DIAGNOSIS — E11.51 TYPE 2 DIABETES MELLITUS WITH DIABETIC PERIPHERAL ANGIOPATHY WITHOUT GANGRENE, WITH LONG-TERM CURRENT USE OF INSULIN (HCC): ICD-10-CM

## 2024-02-15 DIAGNOSIS — Z79.4 TYPE 2 DIABETES MELLITUS WITH DIABETIC PERIPHERAL ANGIOPATHY WITHOUT GANGRENE, WITH LONG-TERM CURRENT USE OF INSULIN (HCC): ICD-10-CM

## 2024-02-15 DIAGNOSIS — I50.32 CHRONIC HEART FAILURE WITH PRESERVED EJECTION FRACTION (HFPEF) (HCC): ICD-10-CM

## 2024-02-15 DIAGNOSIS — E11.622 DIABETIC ULCER OF ANKLE (HCC): Primary | ICD-10-CM

## 2024-02-15 DIAGNOSIS — R06.09 EXERTIONAL DYSPNEA: ICD-10-CM

## 2024-02-15 DIAGNOSIS — L97.309 DIABETIC ULCER OF ANKLE (HCC): Primary | ICD-10-CM

## 2024-02-15 DIAGNOSIS — I73.9 PERIPHERAL ARTERY DISEASE (HCC): ICD-10-CM

## 2024-02-15 PROCEDURE — 99213 OFFICE O/P EST LOW 20 MIN: CPT | Performed by: STUDENT IN AN ORGANIZED HEALTH CARE EDUCATION/TRAINING PROGRAM

## 2024-02-15 PROCEDURE — 99214 OFFICE O/P EST MOD 30 MIN: CPT | Performed by: SURGERY

## 2024-02-15 RX ORDER — LIDOCAINE HYDROCHLORIDE 40 MG/ML
5 SOLUTION TOPICAL ONCE
Status: COMPLETED | OUTPATIENT
Start: 2024-02-15 | End: 2024-02-15

## 2024-02-15 RX ADMIN — LIDOCAINE HYDROCHLORIDE 5 ML: 40 SOLUTION TOPICAL at 10:56

## 2024-02-15 NOTE — TELEPHONE ENCOUNTER
Please call pt to triage, vascular note reads that he is severely short of breath.  I'm not sure what time frame this has been occurring over, however if this is an acute issue, should not wait until tomorrow to be seen. If he is experiencing any chest pains, light headedness, dizziness, should also not wait until tomorrow. Thanks, APOLLO Martinez

## 2024-02-15 NOTE — TELEPHONE ENCOUNTER
Regarding: FW: Blood test results  Contact: [phone]  Can you please advise.    Cathy Myers  [facility]  ----- Message -----  From: Cathy Ayala  Sent: 1/30/2024  11:47 AM EST  To: [facility]  Subject: Blood test results                               ----- Message from Cathy Ayala sent at 1/30/2024 11:47 AM EST -----       ----- Message from Thomas Horne to Frank Lombardi, DO sent at 1/30/2024 11:44 AM -----   I have some concerns about the results of my latest cbc test. My hemoglobin is lower now than it was and I am still not feeling well. So I need another transfusion or to up my iron pills? Please give me a call at [phone] when you have a chance. Thank you!

## 2024-02-15 NOTE — TELEPHONE ENCOUNTER
Patient is having severe discomfort when exerting, walking 20 feet, walking up the steps chest pains, shortness of breath, takes him a while to catch his breath. As soon as he sits down, he feels better. As soon as he gets up and starts to walk, chest pain and shortness of breath returns.    Progressively getting worse over the last few weeks. He's really starting to feel bad.  He does not currently have active chest pain.  He's had a lot of gas lately.    I advised patient that it may be best to go the Emergency Room, but I would still let you know what's going on, and let him know of any input you may have.  Patient has an appointment with the Vascular Surgeon shortly.  He agreed to go to the ED if this continues today.

## 2024-02-15 NOTE — PATIENT INSTRUCTIONS
Orders Placed This Encounter   Procedures    Wound cleansing and dressings     Moisturize lower extremities daily     Standing Status:   Future     Standing Expiration Date:   2/15/2025

## 2024-02-15 NOTE — LETTER
February 15, 2024     Frank Lombardi, DO  [address]  [address]  [place] [place] [address]    Patient: Thomas Horne   YOB: 1945   Date of Visit: 2/15/2024       Dear Dr. Lombardi:    Thank you for referring Thomas Horne to me for evaluation. Below are my notes for this consultation.    If you have questions, please do not hesitate to call me. I look forward to following your patient along with you.         Sincerely,        Vasquez Clark MD        CC: Thomas Nguyenkathleen Clark MD  2/15/2024  4:05 PM  Sign when Signing Visit  Assessment/Plan:    Diabetic ulcer of right midfoot associated with diabetes mellitus due to underlying condition, limited to breakdown of skin (HCC)  Healed right foot ulcer.  6-month duplex studies.  Continue medical therapy.  Upcoming assessment with podiatry for maintenance of footcare to avoid further wounds.  Lab Results   Component Value Date    HGBA1C 7.0 (H) 10/04/2023     Subjective:      Patient ID: Thomas Horne is a 79 y.o. male.    Patient presents to review NILS done 1/11/24 for R heel ulcer. Patient does currently go to wound care.     HPI  Mr. Horne is a pleasant 79-year-old man who presents in follow-up for monitoring of his right heel ulcer.  Fortunately he has healed his right heel ulcer and was released from wound care today.  He underwent a CryoVein femoral to below-knee popliteal artery bypass in the fall.  His recent duplex suggest that he has an inflow stenosis and the bypass is at risk.  Due to the healing of his wound and appropriate footcare going forward, the patient and I have decided to continue to monitor his peripheral arterial occlusive disease without reintervention at this time.  He will follow with his podiatrist Dr. Arango closely and will likely need to have special accommodations to avoid erosion at the region of his right heel in the future.  He speculates that there was talk of a bone spur in this area.  He  "is continuing his medical therapy.  Unfortunately of late he has developed fairly severe shortness of breath.  This is caused him to have difficulty getting up and down the stairs.  We have reached out to Dr. Lombardi's office to try to help him schedule an appointment for evaluation at a short interval.  I reviewed his medication list and I do not see anything that is common place for development of shortness of breath.  He was encouraged to seek emergency care with any worsening or persistence of his symptoms.    Review of Systems   Constitutional: Negative.    HENT: Negative.     Eyes: Negative.    Respiratory: Negative.     Cardiovascular: Negative.    Gastrointestinal: Negative.    Endocrine: Negative.    Genitourinary: Negative.    Musculoskeletal: Negative.    Skin:  Positive for wound.   Allergic/Immunologic: Negative.    Neurological: Negative.    Hematological: Negative.    Psychiatric/Behavioral: Negative.           Objective:  /62 (BP Location: Left arm, Patient Position: Sitting, Cuff Size: Standard)   Pulse (!) 54   Ht 6' 2\" (1.88 m)   Wt 107 kg (235 lb)   BMI 30.17 kg/m²      Physical Exam  Constitutional:       Appearance: Normal appearance.   HENT:      Head: Normocephalic and atraumatic.      Nose: Nose normal. No rhinorrhea.   Eyes:      Extraocular Movements: Extraocular movements intact.      Pupils: Pupils are equal, round, and reactive to light.   Cardiovascular:      Rate and Rhythm: Normal rate and regular rhythm.      Pulses:           Dorsalis pedis pulses are detected w/ Doppler on the right side and detected w/ Doppler on the left side.        Posterior tibial pulses are detected w/ Doppler on the right side and detected w/ Doppler on the left side.      Comments: Healed right heel ulcer.  No surrounding erythema or signs of ongoing infection.  Pulmonary:      Effort: Pulmonary effort is normal.      Breath sounds: No stridor.   Abdominal:      General: There is no distension. "      Tenderness: There is no abdominal tenderness.   Musculoskeletal:         General: No tenderness. Normal range of motion.      Cervical back: Normal range of motion and neck supple.   Skin:     General: Skin is warm.      Capillary Refill: Capillary refill takes less than 2 seconds.      Coloration: Skin is not jaundiced.   Neurological:      General: No focal deficit present.      Mental Status: He is alert and oriented to person, place, and time.   Psychiatric:         Mood and Affect: Mood normal.         Behavior: Behavior normal.

## 2024-02-15 NOTE — ASSESSMENT & PLAN NOTE
Healed right foot ulcer.  6-month duplex studies.  Continue medical therapy.  Upcoming assessment with podiatry for maintenance of footcare to avoid further wounds.  Lab Results   Component Value Date    HGBA1C 7.0 (H) 10/04/2023

## 2024-02-15 NOTE — TELEPHONE ENCOUNTER
Patient is having increased shortness of breath while at Vascular appt. Dr wants patient to be seen by pcp. Scheduled an appt for tomorrow morning with Cathy Mcgovern. Wants to know if patient can be seen today.

## 2024-02-15 NOTE — PROGRESS NOTES
Patient ID: Thomas Horne is a 79 y.o. male Date of Birth 1945     Chief Complaint  Chief Complaint   Patient presents with    Follow Up Wound Care Visit       Allergies  Lisinopril    Assessment:     Diagnoses and all orders for this visit:    Diabetic ulcer of ankle (HCC)  -     Wound cleansing and dressings; Future  -     lidocaine (XYLOCAINE) 4 % topical solution 5 mL    Exertional dyspnea  -     Wound cleansing and dressings; Future  -     lidocaine (XYLOCAINE) 4 % topical solution 5 mL    Type 2 diabetes mellitus with diabetic peripheral angiopathy without gangrene, with long-term current use of insulin (HCC)  -     Wound cleansing and dressings; Future  -     lidocaine (XYLOCAINE) 4 % topical solution 5 mL    Peripheral artery disease (HCC)  -     Wound cleansing and dressings; Future  -     lidocaine (XYLOCAINE) 4 % topical solution 5 mL    Chronic HFpEF/Moderate pHTN (HFpEF) (HCC)  -     Wound cleansing and dressings; Future  -     lidocaine (XYLOCAINE) 4 % topical solution 5 mL            Plan:   It was a pleasure to see Thomas Horne for wound care follow up today  Wounds are fully epithelialized today.  Patient advised to protect newly healed skin with diabetic shoes and moisturization.    He also complains of increased heel pain on the right foot today.  I did advise him that according to his most recent MRI he had a large spur there.  He should speak to his podiatrist regarding pain relieving interventions.  In the meantime can buy insoles for his shoes to help pad the area.   A1C results reviewed with the patient today.   Patient presents with exertional dyspnea and chest pain.  I advised patient today that this is a symptom of angina.  He has an extensive cardiac history that includes heart failure, history of CABG and bypass.  He has been hospitalized in the past for elevated troponins, hypertensive urgency, acute heart failure.  I advised him today that his symptoms are very concerning he should  proceed to the ER immediately for urgent cardiac evaluation.  A this time patient understands that he could be experiencing symptoms of a heart event including MI.  He states that he is very tired of his recent hospitalizations and procedures.  He declined going to the ER today and understands that include heart attack, sudden death.  He states that he will call his cardiologist today to see if he can be seen in the office and get further recommendations.  Thomas is discharged from wound management today with follow-up as needed in the future.  All plans of care discussed with patient at bedside who verbalized understanding with treatment plan.    Wound 12/21/23 Diabetic Ulcer Ankle Posterior;Right (Active)   Wound Image Images linked 02/15/24 1052   Wound Description Yellow;Dry 02/15/24 1052   Clara-wound Assessment Callus 02/15/24 1052   Wound Length (cm) 0 cm 02/15/24 1052   Wound Width (cm) 0 cm 02/15/24 1052   Wound Depth (cm) 0 cm 02/15/24 1052   Wound Surface Area (cm^2) 0 cm^2 02/15/24 1052   Wound Volume (cm^3) 0 cm^3 02/15/24 1052   Calculated Wound Volume (cm^3) 0 cm^3 02/15/24 1052   Change in Wound Size % 100 02/15/24 1052   Drainage Amount None 02/15/24 1052       Wound 12/21/23 Diabetic Ulcer Ankle Posterior;Right (Active)   Date First Assessed/Time First Assessed: 12/21/23 0954   Primary Wound Type: Diabetic Ulcer  Location: Ankle  Wound Location Orientation: Posterior;Right  Wound Description (Comments): MACDONALD 2       [REMOVED] Wound 11/16/23   Heel Anterior;Right (Removed)   Resolved Date: 12/21/23  Date First Assessed/Time First Assessed: 11/16/23 0700   Primary Wound Type: (c)   Traumatic Wound Type: (c)   Location: Heel  Wound Location Orientation: Anterior;Right  Dressing Status: Intact;Dry;Clean  Wound Outcome: (c) O...       [REMOVED] Wound 11/16/23 Leg Right (Removed)   Resolved Date: 01/04/24  Date First Assessed/Time First Assessed: 11/16/23 1440   Location: Leg  Wound Location Orientation:  Right  Incision's 1st Dressing: KERLIX (x0), DRESSING MEPILEX AG BORDER POST-OP 4 X 8 IN (x1), ACE WRAP 6 IN STERILE (x0)  Wou...       [REMOVED] Wound 11/16/23 Groin Right (Removed)   Resolved Date: 01/04/24  Date First Assessed/Time First Assessed: 11/16/23 1440   Location: Groin  Wound Location Orientation: Right  Incision's 1st Dressing: ADHESIVE SKIN HIGH VISCOSITY EXOFIN 1ML (x0), DRESSING MEPILEX AG BORDER POST-OP 4 X 6 IN (x...       [REMOVED] Wound 12/21/23 Diabetic Ulcer Foot Right;Plantar (Removed)   Resolved Date: 01/18/24  Date First Assessed/Time First Assessed: 12/21/23 0948   Primary Wound Type: Diabetic Ulcer  Location: Foot  Wound Location Orientation: Right;Plantar  Wound Description (Comments): MACDONALD 2       Subjective:      .    2/15/24: Patient presents to the office today winded, short of breath, complaining of chest pain, unsteady gait.  Upon questioning regarding his symptoms after patient was seated in the chair he stated that for the past several weeks he has noticed more shortness of breath and chest pain upon ambulation.  This quickly with feeds with rest.  Overall notes that he is not feeling well.  Regarding the wound, he notes he has not noticed any drainage from it for several days.  However he has noticed right heel pain that is unrelated to the wound itself.    2/2/24, 1/25/24: Apply dermagran. Denies any symptoms of infection     1/18/24: Applying wound dressings as directed.  Happy with wound healing.     1/4/24: Since his last visit patient was briefly hospitalized for symptomatic anemia.  DAPT was held at that time and restarted thereafter. Has been applying dermagran as recommended. Happy with wound healing. No symptoms of infection.     12/21/23: Hayden Guzman is a pleasant 78-year-old male with a past medical history of type 2 diabetes mellitus most recent hemoglobin A1c 7.0% 2 months ago, diabetic polyneuropathy, HfpEF G2DD, peripheral arterial disease with history of  revascularization most recently mild peroneal bypass to the right lower extremity., CKD, coronary artery disease history of CABG today for initial wound care consult regarding right heel wound which has been with patient for several months and being followed by podiatry.  Insult appears to have occurred  when patient stepped on a nail several months ago.  Patient follows with podiatry for diabetic footcare as well as wound care.  Was most recently seen yesterday at their office.  Per chart review, patient also does follow with vascular surgery following the recent bypass he had on 11/16/2023..  This was done with CryoVein and it was recommended that wound care would be optimized quickly for a short period of time due to short patency of the CryoVein conduits.  Patient has next follow-up appointment later today with vascular surgery.     MRI R foot 10/26/23:    No evidence of osteomyelitis.   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                The following portions of the patient's history were reviewed and updated as appropriate: allergies, current medications, past family history, past medical history, past social history, past surgical history, and problem list.    Review of Systems   Constitutional:  Negative for chills, diaphoresis and fever.   Respiratory:  Positive for shortness of breath.    Cardiovascular:  Positive for chest pain. Negative for palpitations.   Skin:  Negative for wound.   All other systems reviewed and are negative.        Objective:       Wound 12/21/23 Diabetic Ulcer Ankle Posterior;Right (Active)   Wound Image Images linked 02/15/24 1052   Wound Description Yellow;Dry 02/15/24 1052   Clara-wound Assessment Callus 02/15/24 1052   Wound Length (cm) 0 cm 02/15/24 1052   Wound Width (cm) 0 cm 02/15/24 1052   Wound Depth (cm) 0 cm 02/15/24 1052   Wound Surface Area (cm^2) 0 cm^2 02/15/24 1052   Wound Volume (cm^3) 0 cm^3 02/15/24 1052   Calculated Wound  Volume (cm^3) 0 cm^3 02/15/24 1052   Change in Wound Size % 100 02/15/24 1052   Drainage Amount None 02/15/24 1052       /86   Pulse 77   Temp 98.1 °F (36.7 °C)   Resp 18   SpO2 99%     Physical Exam  Vitals reviewed.   Constitutional:       Appearance: He is ill-appearing (Improved upon rest after 5 minutes).   HENT:      Head: Normocephalic and atraumatic.   Eyes:      Extraocular Movements: Extraocular movements intact.   Cardiovascular:      Rate and Rhythm: Normal rate and regular rhythm.   Pulmonary:      Effort: Respiratory distress (Initial accessory use noted however this improved upon rest) present.      Breath sounds: Normal breath sounds.   Chest:      Chest wall: No tenderness.   Musculoskeletal:      Cervical back: Neck supple.   Skin:     Comments: The right ankle wound is fully epithelialized today.  No open wound or exudate.   Neurological:      Mental Status: He is alert.   Psychiatric:         Mood and Affect: Mood normal.           Wound Instructions:  Orders Placed This Encounter   Procedures    Wound cleansing and dressings     Moisturize lower extremities daily     Standing Status:   Future     Standing Expiration Date:   2/15/2025        Diagnosis ICD-10-CM Associated Orders   1. Diabetic ulcer of ankle ([facility])  E11.622 Wound cleansing and dressings    L97.309 lidocaine (XYLOCAINE) 4 % topical solution 5 mL      2. Exertional dyspnea  R06.09 Wound cleansing and dressings     lidocaine (XYLOCAINE) 4 % topical solution 5 mL      3. Type 2 diabetes mellitus with diabetic peripheral angiopathy without gangrene, with long-term current use of insulin ([facility])  E11.51 Wound cleansing and dressings    Z79.4 lidocaine (XYLOCAINE) 4 % topical solution 5 mL      4. Peripheral artery disease (HCC)  I73.9 Wound cleansing and dressings     lidocaine (XYLOCAINE) 4 % topical solution 5 mL      5. Chronic HFpEF/Moderate pHTN (HFpEF) ([facility])  I50.32 Wound cleansing and dressings     lidocaine (XYLOCAINE) 4  "% topical solution 5 mL          --  David Frye MD    \"This note has been constructed using a voice recognition system. Therefore there may be syntax, spelling, and/or grammatical errors. Occasional wrong word or \"sound alike\" substitutions may have occurred due to the inherent limitations of voice recognition software. Read the chart carefully and recognize, using context, where substitutions have occurred. Please call if you have any questions.\"     "

## 2024-02-15 NOTE — PATIENT INSTRUCTIONS
1. Diabetic ulcer of right midfoot associated with diabetes mellitus due to underlying condition, limited to breakdown of skin (HCC)  Assessment & Plan:  Healed right foot ulcer.  6-month duplex studies.  Continue medical therapy.  Upcoming assessment with podiatry for maintenance of footcare to avoid further wounds.  Lab Results   Component Value Date    HGBA1C 7.0 (H) 10/04/2023

## 2024-02-15 NOTE — TELEPHONE ENCOUNTER
Spoke with patient, pt is having chest pain, SOB, dizziness, light headed. Instructed pt to go to the ED tonight and not wait for appt tomorrow.  Pt said he understood.   JAIDA Olsen CMA

## 2024-02-15 NOTE — PROGRESS NOTES
Assessment/Plan:    Diabetic ulcer of right midfoot associated with diabetes mellitus due to underlying condition, limited to breakdown of skin (HCC)  Healed right foot ulcer.  6-month duplex studies.  Continue medical therapy.  Upcoming assessment with podiatry for maintenance of footcare to avoid further wounds.  Lab Results   Component Value Date    HGBA1C 7.0 (H) 10/04/2023     Subjective:      Patient ID: Thomas Horne is a 79 y.o. male.    Patient presents to review NILS done 1/11/24 for R heel ulcer. Patient does currently go to wound care.     HPI  Mr. Horne is a pleasant 79-year-old man who presents in follow-up for monitoring of his right heel ulcer.  Fortunately he has healed his right heel ulcer and was released from wound care today.  He underwent a CryoVein femoral to below-knee popliteal artery bypass in the fall.  His recent duplex suggest that he has an inflow stenosis and the bypass is at risk.  Due to the healing of his wound and appropriate footcare going forward, the patient and I have decided to continue to monitor his peripheral arterial occlusive disease without reintervention at this time.  He will follow with his podiatrist Dr. Arango closely and will likely need to have special accommodations to avoid erosion at the region of his right heel in the future.  He speculates that there was talk of a bone spur in this area.  He is continuing his medical therapy.  Unfortunately of late he has developed fairly severe shortness of breath.  This is caused him to have difficulty getting up and down the stairs.  We have reached out to Dr. Lombardi's office to try to help him schedule an appointment for evaluation at a short interval.  I reviewed his medication list and I do not see anything that is common place for development of shortness of breath.  He was encouraged to seek emergency care with any worsening or persistence of his symptoms.    Review of Systems   Constitutional: Negative.    HENT:  "Negative.     Eyes: Negative.    Respiratory: Negative.     Cardiovascular: Negative.    Gastrointestinal: Negative.    Endocrine: Negative.    Genitourinary: Negative.    Musculoskeletal: Negative.    Skin:  Positive for wound.   Allergic/Immunologic: Negative.    Neurological: Negative.    Hematological: Negative.    Psychiatric/Behavioral: Negative.           Objective:  /62 (BP Location: Left arm, Patient Position: Sitting, Cuff Size: Standard)   Pulse (!) 54   Ht 6' 2\" (1.88 m)   Wt 107 kg (235 lb)   BMI 30.17 kg/m²      Physical Exam  Constitutional:       Appearance: Normal appearance.   HENT:      Head: Normocephalic and atraumatic.      Nose: Nose normal. No rhinorrhea.   Eyes:      Extraocular Movements: Extraocular movements intact.      Pupils: Pupils are equal, round, and reactive to light.   Cardiovascular:      Rate and Rhythm: Normal rate and regular rhythm.      Pulses:           Dorsalis pedis pulses are detected w/ Doppler on the right side and detected w/ Doppler on the left side.        Posterior tibial pulses are detected w/ Doppler on the right side and detected w/ Doppler on the left side.      Comments: Healed right heel ulcer.  No surrounding erythema or signs of ongoing infection.  Pulmonary:      Effort: Pulmonary effort is normal.      Breath sounds: No stridor.   Abdominal:      General: There is no distension.      Tenderness: There is no abdominal tenderness.   Musculoskeletal:         General: No tenderness. Normal range of motion.      Cervical back: Normal range of motion and neck supple.   Skin:     General: Skin is warm.      Capillary Refill: Capillary refill takes less than 2 seconds.      Coloration: Skin is not jaundiced.   Neurological:      General: No focal deficit present.      Mental Status: He is alert and oriented to person, place, and time.   Psychiatric:         Mood and Affect: Mood normal.         Behavior: Behavior normal.           "

## 2024-02-15 NOTE — TELEPHONE ENCOUNTER
Pt has a message from vascular about acute sob,. HAS AN APPT THAT WAS MADE FOR TOMORROW.  lOOKS LIKE IT WAS SENT OVER TO TRIAGE TO SEE IF PT NEEDS TO BE SENT TO THE ed - [facility]

## 2024-02-16 NOTE — TELEPHONE ENCOUNTER
"I spoke with Thomas. He states that he resumed his iron pills because they were on hold for one week.since he had a colonoscopy.  He restarted the iron pills yesterday (one daily- Ferrous Sulfate 324mg ) He states that he feels \"fine\" today. He hasn't exerted himself today. He is having a \"leisurely cup of coffee\" and his daughter is with him. ( Daughter is Whitley) He lives with his daughter.  He denies chest pain, dizziness, dyspnea at this time. I explained the importance of being evaluated immediately for any chest pain, dyspnea, dizziness, weakness, etc and that anemia can cause those symptoms. He understands. He states that he will go to the ER if he has any of those s/s and his daughter will keep an eye on him as well. She is with him during this call. I told him I will give this update to Cathy and he knows to call us back if any questions/concerns. He stated that he wanted to give the iron pills a chance to build back up [employer]  "

## 2024-02-16 NOTE — TELEPHONE ENCOUNTER
Spoke with pt , discussed his symptoms.  Pt is NOT HAVING ANY CHEST PAIN OR SOB  Pt restarted his iron - he thinks the stoppage of his iron is what is causing in problems.  Pt advised I do not think this is the case.  He agrees to go to the ed if the symptoms restart

## 2024-02-16 NOTE — TELEPHONE ENCOUNTER
Pt never went to the ER.  Can someone please call to check on him?  He needs to go to the ER to have testing done. APOLLO Martinez

## 2024-02-19 PROCEDURE — 88305 TISSUE EXAM BY PATHOLOGIST: CPT | Performed by: PATHOLOGY

## 2024-02-22 ENCOUNTER — OFFICE VISIT (OUTPATIENT)
Dept: GASTROENTEROLOGY | Facility: CLINIC | Age: 79
End: 2024-02-22
Payer: COMMERCIAL

## 2024-02-22 VITALS
BODY MASS INDEX: 29.65 KG/M2 | WEIGHT: 231 LBS | SYSTOLIC BLOOD PRESSURE: 142 MMHG | DIASTOLIC BLOOD PRESSURE: 74 MMHG | HEIGHT: 74 IN | HEART RATE: 69 BPM

## 2024-02-22 DIAGNOSIS — K92.1 MELENA: ICD-10-CM

## 2024-02-22 DIAGNOSIS — K26.9 DUODENAL ULCER: Primary | ICD-10-CM

## 2024-02-22 DIAGNOSIS — Z86.010 HISTORY OF COLON POLYPS: ICD-10-CM

## 2024-02-22 DIAGNOSIS — D62 ACUTE BLOOD LOSS ANEMIA: ICD-10-CM

## 2024-02-22 PROBLEM — Z86.0100 HISTORY OF COLON POLYPS: Status: ACTIVE | Noted: 2024-02-22

## 2024-02-22 PROCEDURE — 99213 OFFICE O/P EST LOW 20 MIN: CPT | Performed by: PHYSICIAN ASSISTANT

## 2024-02-22 NOTE — PROGRESS NOTES
[facility] - Outpatient Progress Note  Thomas Horne 79 y.o. male MRN: [id number]  Encounter: [id number]    Assessment and Plan    1. Duodenal ulcer  -Clean-based noted on December 26, 2023 EGD  -Gastric biopsy negative for H. Pylori  -Decrease pantoprazole from 40 mg twice daily to 40 mg once daily  -Would continue pantoprazole 40 mg once daily from here out    2. Acute blood loss anemia  -Second likely to duodenal ulcer  -Hemoglobin on January 29, 2024 9.0  -Colonoscopy February 24, 2024 with no signs of active or previous bleeding  -Primary care to follow hemoglobin and provide any iron supplementation.  He is currently on oral iron daily    3. Melena  -Second to duodenal ulcer  -Resolved at this time    4.  History of colon polyps  -Last colonoscopy February 14, 2024 with adequate bowel prep and 1 rectal fibroid polyp removed.  Incidental diverticulosis and internal hemorrhoids noted  -No further colonoscopies recommended second age    --------------------------------------------------------------------------------------------------------------------    Chief Complaint: Hospital follow-up    HPI: Thomas Horne is a 79 y.o. male new to me with past medical history of hyperlipidemia, hypertension, coronary artery disease, cardiac stenting, history of CABG, abdominal aortic aneurysm repair, prostatectomy, history of prostate cancer, diabetes type 2, peripheral artery disease with endovascular stenting, congestive heart failure with preserved ejection fraction, chronic kidney disease, duodenal ulcer and anemia of chronic kidney disease who presents today for follow up for hospitalization at the end of December 2023.  He was seen in consult on December 26, 2023 for symptomatic anemia and melena.  He underwent an EGD that day showing clean-based duodenal ulcer and has been on pantoprazole 40 mg twice daily since then.  Gastric biopsy and duodenal biopsies negative for H. pylori.  Hemoglobin  has remained stable 9.0 to 9.2 to 9.0.  He denies any further melena.    Interval history: Patient had a colonoscopy done on February 14, 2024 with adequate bowel prep.  1 rectal fibroid tumor removed and incidental diverticulosis and internal hemorrhoids noted.   Strength returning.  On oral iron.  No further signs of bleeding.    Endoscopy History:  EGD -December 26, 2023 done for symptomatic anemia and melena with notation of clean-based duodenal ulcer.  Biopsy negative for H. pylori.  Colonoscopy - Likely 5-6 years ago.  He did have previous polyps.  Brother and sister with history of colon cancer.    Review of Systems:   General: negative for fatigue, fever, night sweats or unexpected weight loss  Psychological: negative for anxiety or depression  Ophthalmic: negative for blurry vision or scleral icterus  ENT: negative for headaches, sore throat or dysphagia  Hematological and Lymphatic: negative for pallor or swollen lymph nodes  Respiratory: negative for cough, shortness of breath or wheezing  Cardiovascular: negative for chest pain, edema or murmur  Gastrointestinal: as mentioned in HPI  Genito-Urinary: negative for dysuria or incontinence  Musculoskeletal: negative for joint pain, joint stiffness or joint swelling  Dermatological: negative for pruritus, rash, or jaundice    Current Medications  Current Outpatient Medications   Medication Sig Dispense Refill   • acetaminophen (TYLENOL) 325 mg tablet Take 2 tablets (650 mg total) by mouth every 6 (six) hours as needed for mild pain  0   • amLODIPine (NORVASC) 10 mg tablet Take 0.5 tablets (5 mg total) by mouth daily 1 tablet 1   • aspirin 81 mg chewable tablet Chew 81 mg daily     • atorvastatin (LIPITOR) 40 mg tablet Take 1 tablet (40 mg total) by mouth daily (Patient taking differently: Take 40 mg by mouth every evening) 90 tablet 3   • cloNIDine (CATAPRES) 0.1 mg tablet take 1 tablet by mouth every 12 hours 180 tablet 1   • clopidogrel (PLAVIX) 75 mg  tablet TAKE 1 TABLET BY MOUTH DAILY 90 tablet 3   • Droplet Pen Needles 32G X 4 MM MISC USE EVERY EVENING 100 each 5   • Empagliflozin 25 MG TABS Take 1 tablet (25 mg total) by mouth daily 90 tablet 1   • fenofibrate 160 MG tablet Take 1 tablet (160 mg total) by mouth daily 90 tablet 3   • ferrous sulfate 324 (65 Fe) mg Take 1 tablet (324 mg total) by mouth daily 90 tablet 0   • gabapentin (NEURONTIN) 600 MG tablet TAKE 1 TABLET BY MOUTH  TWICE DAILY 180 tablet 3   • glimepiride (AMARYL) 2 mg tablet Take 1 tablet (2 mg total) by mouth 2 (two) times a day 180 tablet 3   • glucose blood (OneTouch Verio) test strip TEST TWICE A  strip 5   • insulin glargine (LANTUS) 100 units/mL subcutaneous injection Inject 15 Units under the skin daily at bedtime 10 mL 0   • isosorbide mononitrate (IMDUR) 60 mg 24 hr tablet Take 1 tablet (60 mg total) by mouth daily 90 tablet 3   • metFORMIN (GLUCOPHAGE) 500 mg tablet Take 500 mg by mouth 2 (two) times a day with meals     • metoprolol tartrate (LOPRESSOR) 50 mg tablet Take 1 tablet (50 mg total) by mouth every 12 (twelve) hours 180 tablet 3   • Multiple Vitamins-Minerals (MULTIVITAMIN MEN 50+ PO) Take by mouth daily     • nitroglycerin (NITROSTAT) 0.4 mg SL tablet Place 1 tablet (0.4 mg total) under the tongue every 5 (five) minutes as needed for chest pain 30 tablet 3   • Omega-3 Fatty Acids (fish oil) 1,000 mg Take 4,000 mg by mouth 2 (two) times a day     • ondansetron (ZOFRAN) 4 mg tablet Take 4 mg by mouth every 8 (eight) hours as needed for nausea or vomiting     • pantoprazole (PROTONIX) 40 mg tablet Take 1 tablet (40 mg total) by mouth 2 (two) times a day 180 tablet 0   • ranolazine (RANEXA) 500 mg 12 hr tablet take 1 tablet by mouth twice a day 60 tablet 3   • sitaGLIPtin (JANUVIA) 100 mg tablet Take 1 tablet (100 mg total) by mouth daily 90 tablet 1   • torsemide (DEMADEX) 20 mg tablet Take 1 tablet (20 mg total) by mouth daily 90 tablet 3   • Blood Glucose  Monitoring Suppl (OneTouch Verio Reflect) w/Device KIT Check blood sugars twice daily. Please substitute with appropriate alternative as covered by patient's insurance. Dx: E11.65 (Patient not taking: Reported on 2/1/2024) 1 kit 0   • Elastic Bandages & Supports (Medical Compression Stockings) MISC Use daily Knee High 15-20mmHg (Patient not taking: Reported on 2/1/2024) 2 each 4   • OneTouch Delica Lancets 33G MISC Check blood sugars twice daily. Please substitute with appropriate alternative as covered by patient's insurance. Dx: E11.65 (Patient not taking: Reported on 2/1/2024) 200 each 3   • polyethylene glycol (GOLYTELY) 4000 mL solution Take 4,000 mL by mouth once for 1 dose (Patient not taking: Reported on 2/22/2024) 4000 mL 0     No current facility-administered medications for this visit.       Past Medical History  Past Medical History:   Diagnosis Date   • Anemia    • CAD (coronary artery disease)    • Cancer (HCC)     prostate   • CHF (congestive heart failure) (HCC)    • Chronic kidney disease    • Diabetes mellitus (HCC)    • Duodenal ulcer    • Hyperlipidemia    • Hypertension    • Myocardial infarction (HCC)    • Neuropathy     Bilateral feet   • Sleep apnea     Could not tolerate CPAP       Past Surgical History  Past Surgical History:   Procedure Laterality Date   • ADENOIDECTOMY     • CARDIAC CATHETERIZATION Left 10/19/2022    Procedure: Cardiac Left Heart Cath;  Surgeon: Danielle Pereira MD;  Location: [facility];  Service: Cardiology   • CARDIAC SURGERY  2002    3 cardiac bypass then angioplasty 7/2020   • CHOLECYSTECTOMY     • COLONOSCOPY  02/14/2024   • CORONARY ARTERY BYPASS GRAFT     • IR LOWER EXTREMITY ANGIOGRAM  11/01/2023   • OK BYPASS W/VEIN FEMORAL-POPLITEAL Right 11/16/2023    Procedure: BYPASS FEMORAL-POPLITEAL WITH CRYO VEIN, RIGHT FEMORAL ENDARTERECTOMY;  Surgeon: Vasquez Clark MD;  Location: [facility];  Service: Vascular   • OK SLCTV CATHJ 3RD+ ORD SLCTV  "ABDL PEL/LXTR BRNCH Right 2023    Procedure: ARTERIOGRAM Right lower extremity arteriogram with CO2 via right groin access;  Surgeon: Vasquez Clark MD;  Location: [facility];  Service: Vascular   • PROSTATE SURGERY     • TONSILLECTOMY         Past Social History   Social History     Socioeconomic History   • Marital status:      Spouse name: None   • Number of children: 2   • Years of education: None   • Highest education level: Some college, no degree   Occupational History   • None   Tobacco Use   • Smoking status: Former     Current packs/day: 0.00     Average packs/day: 1.5 packs/day for 33.0 years (49.5 ttl pk-yrs)     Types: Cigarettes     Start date:      Quit date:      Years since quittin.1   • Smokeless tobacco: Never   Vaping Use   • Vaping status: Never Used   Substance and Sexual Activity   • Alcohol use: Yes     Alcohol/week: 14.0 standard drinks of alcohol     Types: 14 Cans of beer per week     Comment: 1 -2 daily   • Drug use: Never   • Sexual activity: None   Other Topics Concern   • None   Social History Narrative   • None     Social Determinants of Health     Financial Resource Strain: Low Risk  (10/31/2023)    Overall Financial Resource Strain (CARDIA)    • Difficulty of Paying Living Expenses: Not hard at all   Food Insecurity: Not on file   Transportation Needs: No Transportation Needs (10/31/2023)    PRAPARE - Transportation    • Lack of Transportation (Medical): No    • Lack of Transportation (Non-Medical): No   Physical Activity: Not on file   Stress: Not on file   Social Connections: Not on file   Intimate Partner Violence: Not on file   Housing Stability: Not on file       Vital Signs  Vitals:    24 0835   BP: 142/74   BP Location: Left arm   Patient Position: Sitting   Cuff Size: Standard   Pulse: 69   Weight: 105 kg (231 lb)   Height: 6' 2\" (1.88 m)         Physical Exam:  General appearance: alert, cooperative, no distress  HEENT: " normocephalic, anicteric, no eye erythema or discharge, no oropharyngeal thrush  Neck: supple  Lungs: CTA b/l, no rales, rhonchi, or wheezing, unlabored respirations  Heart: RRR, no murmur, rubs, or gallops  Abdomen: soft, non-tender, non-distended, normal bowel sounds, no masses or organomegaly  Rectal: deferred  Extremities: no cyanosis, clubbing, or edema  Musculoskeletal: normal gait  Skin: color and texture normal, no jaundice, no rashes or lesions  Psychiatric: alert and oriented, normal affect and behavior                                                          Tj Paulino PA-C

## 2024-02-22 NOTE — LETTER
February 22, 2024     Frank Lombardi, DO  [address] [facility]  [address]  [facility] [address]    Patient: Thomas Horen   YOB: 1945   Date of Visit: 2/22/2024       Dear Dr. Lombardi:    Thank you for referring Thomas Horne to me for evaluation. Below are my notes for this consultation.    If you have questions, please do not hesitate to call me. I look forward to following your patient along with you.         Sincerely,        Tj Paulino PA-C        CC: No Recipients    Tj Paulino PA-C  2/22/2024  8:44 AM  Incomplete  [facility] - Outpatient Progress Note  Thomas Horne 79 y.o. male MRN: [id number]  Encounter: [id number]    Assessment and Plan    1. Duodenal ulcer  -Clean-based noted on December 26, 2023 EGD  -Gastric biopsy negative for H. Pylori  -Decrease pantoprazole from 40 mg twice daily to 40 mg once daily  -Would continue pantoprazole 40 mg once daily from here out    2. Acute blood loss anemia  -Second likely to duodenal ulcer  -Hemoglobin on January 29, 2024 9.0  -Colonoscopy February 24, 2024 with no signs of active or previous bleeding  -Primary care to follow hemoglobin and provide any iron supplementation.  He is currently on oral iron daily    3. Melena  -Second to duodenal ulcer  -Resolved at this time    4.  History of colon polyps  -Last colonoscopy February 14, 2024 with adequate bowel prep and 1 rectal fibroid polyp removed.  Incidental diverticulosis and internal hemorrhoids noted  -No further colonoscopies recommended second age    --------------------------------------------------------------------------------------------------------------------    Chief Complaint: Hospital follow-up    HPI: Thomas Horne is a 79 y.o. male new to me with past medical history of hyperlipidemia, hypertension, coronary artery disease, cardiac stenting, history of CABG, abdominal aortic aneurysm repair, prostatectomy, history of prostate  cancer, diabetes type 2, peripheral artery disease with endovascular stenting, congestive heart failure with preserved ejection fraction, chronic kidney disease, duodenal ulcer and anemia of chronic kidney disease who presents today for follow up for hospitalization at the end of December 2023.  He was seen in consult on December 26, 2023 for symptomatic anemia and melena.  He underwent an EGD that day showing clean-based duodenal ulcer and has been on pantoprazole 40 mg twice daily since then.  Gastric biopsy and duodenal biopsies negative for H. pylori.  Hemoglobin has remained stable 9.0 to 9.2 to 9.0.  He denies any further melena.    Interval history: Patient had a colonoscopy done on February 14, 2024 with adequate bowel prep.  1 rectal fibroid tumor removed and incidental diverticulosis and internal hemorrhoids noted.   Strength returning.  On oral iron.  No further signs of bleeding.    Endoscopy History:  EGD -December 26, 2023 done for symptomatic anemia and melena with notation of clean-based duodenal ulcer.  Biopsy negative for H. pylori.  Colonoscopy - Likely 5-6 years ago.  He did have previous polyps.  Brother and sister with history of colon cancer.    Review of Systems:   General: negative for fatigue, fever, night sweats or unexpected weight loss  Psychological: negative for anxiety or depression  Ophthalmic: negative for blurry vision or scleral icterus  ENT: negative for headaches, sore throat or dysphagia  Hematological and Lymphatic: negative for pallor or swollen lymph nodes  Respiratory: negative for cough, shortness of breath or wheezing  Cardiovascular: negative for chest pain, edema or murmur  Gastrointestinal: as mentioned in HPI  Genito-Urinary: negative for dysuria or incontinence  Musculoskeletal: negative for joint pain, joint stiffness or joint swelling  Dermatological: negative for pruritus, rash, or jaundice    Current Medications  Current Outpatient Medications   Medication Sig  Dispense Refill   • acetaminophen (TYLENOL) 325 mg tablet Take 2 tablets (650 mg total) by mouth every 6 (six) hours as needed for mild pain  0   • amLODIPine (NORVASC) 10 mg tablet Take 0.5 tablets (5 mg total) by mouth daily 1 tablet 1   • aspirin 81 mg chewable tablet Chew 81 mg daily     • atorvastatin (LIPITOR) 40 mg tablet Take 1 tablet (40 mg total) by mouth daily (Patient taking differently: Take 40 mg by mouth every evening) 90 tablet 3   • cloNIDine (CATAPRES) 0.1 mg tablet take 1 tablet by mouth every 12 hours 180 tablet 1   • clopidogrel (PLAVIX) 75 mg tablet TAKE 1 TABLET BY MOUTH DAILY 90 tablet 3   • Droplet Pen Needles 32G X 4 MM MISC USE EVERY EVENING 100 each 5   • Empagliflozin 25 MG TABS Take 1 tablet (25 mg total) by mouth daily 90 tablet 1   • fenofibrate 160 MG tablet Take 1 tablet (160 mg total) by mouth daily 90 tablet 3   • ferrous sulfate 324 (65 Fe) mg Take 1 tablet (324 mg total) by mouth daily 90 tablet 0   • gabapentin (NEURONTIN) 600 MG tablet TAKE 1 TABLET BY MOUTH  TWICE DAILY 180 tablet 3   • glimepiride (AMARYL) 2 mg tablet Take 1 tablet (2 mg total) by mouth 2 (two) times a day 180 tablet 3   • glucose blood (OneTouch Verio) test strip TEST TWICE A  strip 5   • insulin glargine (LANTUS) 100 units/mL subcutaneous injection Inject 15 Units under the skin daily at bedtime 10 mL 0   • isosorbide mononitrate (IMDUR) 60 mg 24 hr tablet Take 1 tablet (60 mg total) by mouth daily 90 tablet 3   • metFORMIN (GLUCOPHAGE) 500 mg tablet Take 500 mg by mouth 2 (two) times a day with meals     • metoprolol tartrate (LOPRESSOR) 50 mg tablet Take 1 tablet (50 mg total) by mouth every 12 (twelve) hours 180 tablet 3   • Multiple Vitamins-Minerals (MULTIVITAMIN MEN 50+ PO) Take by mouth daily     • nitroglycerin (NITROSTAT) 0.4 mg SL tablet Place 1 tablet (0.4 mg total) under the tongue every 5 (five) minutes as needed for chest pain 30 tablet 3   • Omega-3 Fatty Acids (fish oil) 1,000 mg  Take 4,000 mg by mouth 2 (two) times a day     • ondansetron (ZOFRAN) 4 mg tablet Take 4 mg by mouth every 8 (eight) hours as needed for nausea or vomiting     • pantoprazole (PROTONIX) 40 mg tablet Take 1 tablet (40 mg total) by mouth 2 (two) times a day 180 tablet 0   • ranolazine (RANEXA) 500 mg 12 hr tablet take 1 tablet by mouth twice a day 60 tablet 3   • sitaGLIPtin (JANUVIA) 100 mg tablet Take 1 tablet (100 mg total) by mouth daily 90 tablet 1   • torsemide (DEMADEX) 20 mg tablet Take 1 tablet (20 mg total) by mouth daily 90 tablet 3   • Blood Glucose Monitoring Suppl (OneTouch Verio Reflect) w/Device KIT Check blood sugars twice daily. Please substitute with appropriate alternative as covered by patient's insurance. Dx: E11.65 (Patient not taking: Reported on 2/1/2024) 1 kit 0   • Elastic Bandages & Supports (Medical Compression Stockings) MISC Use daily Knee High 15-20mmHg (Patient not taking: Reported on 2/1/2024) 2 each 4   • OneTouch Delica Lancets 33G MISC Check blood sugars twice daily. Please substitute with appropriate alternative as covered by patient's insurance. Dx: E11.65 (Patient not taking: Reported on 2/1/2024) 200 each 3   • polyethylene glycol (GOLYTELY) 4000 mL solution Take 4,000 mL by mouth once for 1 dose (Patient not taking: Reported on 2/22/2024) 4000 mL 0     No current facility-administered medications for this visit.       Past Medical History  Past Medical History:   Diagnosis Date   • Anemia    • CAD (coronary artery disease)    • Cancer (HCC)     prostate   • CHF (congestive heart failure) (HCC)    • Chronic kidney disease    • Diabetes mellitus (HCC)    • Duodenal ulcer    • Hyperlipidemia    • Hypertension    • Myocardial infarction (HCC)    • Neuropathy     Bilateral feet   • Sleep apnea     Could not tolerate CPAP       Past Surgical History  Past Surgical History:   Procedure Laterality Date   • ADENOIDECTOMY     • CARDIAC CATHETERIZATION Left 10/19/2022    Procedure:  Cardiac Left Heart Cath;  Surgeon: Danielle Pereira MD;  [facility];  Service: Cardiology   • CARDIAC SURGERY      3 cardiac bypass then angioplasty 2020   • CHOLECYSTECTOMY     • COLONOSCOPY  2024   • CORONARY ARTERY BYPASS GRAFT     • IR LOWER EXTREMITY ANGIOGRAM  2023   • CT BYPASS W/VEIN FEMORAL-POPLITEAL Right 2023    Procedure: BYPASS FEMORAL-POPLITEAL WITH CRYO VEIN, RIGHT FEMORAL ENDARTERECTOMY;  Surgeon: Vasquez Clark MD;  [facility];  Service: Vascular   • CT SLCTV CATHJ 3RD+ ORD SLCTV ABDL PEL/LXTR BRNCH Right 2023    Procedure: ARTERIOGRAM Right lower extremity arteriogram with CO2 via right groin access;  Surgeon: Vasquez Clark MD;  [facility];  Service: Vascular   • PROSTATE SURGERY     • TONSILLECTOMY         Past Social History   Social History     Socioeconomic History   • Marital status:      Spouse name: None   • Number of children: 2   • Years of education: None   • Highest education level: Some college, no degree   Occupational History   • None   Tobacco Use   • Smoking status: Former     Current packs/day: 0.00     Average packs/day: 1.5 packs/day for 33.0 years (49.5 ttl pk-yrs)     Types: Cigarettes     Start date:      Quit date:      Years since quittin.1   • Smokeless tobacco: Never   Vaping Use   • Vaping status: Never Used   Substance and Sexual Activity   • Alcohol use: Yes     Alcohol/week: 14.0 standard drinks of alcohol     Types: 14 Cans of beer per week     Comment: 1 -2 daily   • Drug use: Never   • Sexual activity: None   Other Topics Concern   • None   Social History Narrative   • None     Social Determinants of Health     Financial Resource Strain: Low Risk  (10/31/2023)    Overall Financial Resource Strain (CARDIA)    • Difficulty of Paying Living Expenses: Not hard at all   Food Insecurity: Not on file   Transportation Needs: No Transportation Needs  "(10/31/2023)    PRAPARE - Transportation    • Lack of Transportation (Medical): No    • Lack of Transportation (Non-Medical): No   Physical Activity: Not on file   Stress: Not on file   Social Connections: Not on file   Intimate Partner Violence: Not on file   Housing Stability: Not on file       Vital Signs  Vitals:    02/22/24 0835   BP: 142/74   BP Location: Left arm   Patient Position: Sitting   Cuff Size: Standard   Pulse: 69   Weight: 105 kg (231 lb)   Height: 6' 2\" (1.88 m)         Physical Exam:  General appearance: alert, cooperative, no distress  HEENT: normocephalic, anicteric, no eye erythema or discharge, no oropharyngeal thrush  Neck: supple  Lungs: CTA b/l, no rales, rhonchi, or wheezing, unlabored respirations  Heart: RRR, no murmur, rubs, or gallops  Abdomen: soft, non-tender, non-distended, normal bowel sounds, no masses or organomegaly  Rectal: deferred  Extremities: no cyanosis, clubbing, or edema  Musculoskeletal: normal gait  Skin: color and texture normal, no jaundice, no rashes or lesions  Psychiatric: alert and oriented, normal affect and behavior                                                          Tj Paulino PA-C  2/22/2024  8:21 AM  Sign when Signing Visit  [facility] - Outpatient Progress Note  Thomas Horne 79 y.o. male MRN: [id number]  Encounter: [id number]    Assessment and Plan    1. Duodenal ulcer  -Clean-based noted on December 26, 2023 EGD  -Gastric biopsy negative for H. Pylori  -Decrease pantoprazole from 40 mg twice daily to 40 mg once daily  -Would continue pantoprazole 40 mg once daily from here out    2. Acute blood loss anemia  -Second likely to duodenal ulcer  -Hemoglobin on January 29, 2024 9.0  -Colonoscopy February 24, 2024 with no signs of active or previous bleeding  -Primary care to follow hemoglobin and provide any iron supplementation.  He is currently on oral iron daily    3. " Melena  -Second to duodenal ulcer  -Resolved at this time    4.  History of colon polyps  -Last colonoscopy February 14, 2024 with adequate bowel prep and 1 rectal fibroid polyp removed.  Incidental diverticulosis and internal hemorrhoids noted  -No further colonoscopies recommended second age    --------------------------------------------------------------------------------------------------------------------    Chief Complaint: Hospital follow-up    HPI: Thomas Horne is a 79 y.o. male new to me with past medical history of hyperlipidemia, hypertension, coronary artery disease, cardiac stenting, history of CABG, abdominal aortic aneurysm repair, prostatectomy, history of prostate cancer, diabetes type 2, peripheral artery disease with endovascular stenting, congestive heart failure with preserved ejection fraction, chronic kidney disease, duodenal ulcer and anemia of chronic kidney disease who presents today for follow up for hospitalization at the end of December 2023.  He was seen in consult on December 26, 2023 for symptomatic anemia and melena.  He underwent an EGD that day showing clean-based duodenal ulcer and has been on pantoprazole 40 mg twice daily since then.  Gastric biopsy and duodenal biopsies negative for H. pylori.  Hemoglobin has remained stable 9.0 to 9.2 to 9.0.  He denies any further melena.    Interval history: Patient had a colonoscopy done on February 14, 2024 with adequate bowel prep.  1 rectal fibroid tumor removed and incidental diverticulosis and internal hemorrhoids noted.    Endoscopy History:  EGD -December 26, 2023 done for symptomatic anemia and melena with notation of clean-based duodenal ulcer.  Biopsy negative for H. pylori.  Colonoscopy - Likely 5-6 years ago.  He did have previous polyps.  Brother and sister with history of colon cancer.    Review of Systems:   General: negative for fatigue, fever, night sweats or unexpected weight loss  Psychological: negative for anxiety or  depression  Ophthalmic: negative for blurry vision or scleral icterus  ENT: negative for headaches, sore throat or dysphagia  Hematological and Lymphatic: negative for pallor or swollen lymph nodes  Respiratory: negative for cough, shortness of breath or wheezing  Cardiovascular: negative for chest pain, edema or murmur  Gastrointestinal: as mentioned in HPI  Genito-Urinary: negative for dysuria or incontinence  Musculoskeletal: negative for joint pain, joint stiffness or joint swelling  Dermatological: negative for pruritus, rash, or jaundice    Current Medications  Current Outpatient Medications   Medication Sig Dispense Refill   • acetaminophen (TYLENOL) 325 mg tablet Take 2 tablets (650 mg total) by mouth every 6 (six) hours as needed for mild pain  0   • amLODIPine (NORVASC) 10 mg tablet Take 0.5 tablets (5 mg total) by mouth daily 1 tablet 1   • aspirin 81 mg chewable tablet Chew 81 mg daily     • atorvastatin (LIPITOR) 40 mg tablet Take 1 tablet (40 mg total) by mouth daily (Patient taking differently: Take 40 mg by mouth every evening) 90 tablet 3   • Blood Glucose Monitoring Suppl (OneTouch Verio Reflect) w/Device KIT Check blood sugars twice daily. Please substitute with appropriate alternative as covered by patient's insurance. Dx: E11.65 (Patient not taking: Reported on 2/1/2024) 1 kit 0   • cloNIDine (CATAPRES) 0.1 mg tablet take 1 tablet by mouth every 12 hours 180 tablet 1   • clopidogrel (PLAVIX) 75 mg tablet TAKE 1 TABLET BY MOUTH DAILY 90 tablet 3   • Droplet Pen Needles 32G X 4 MM MISC USE EVERY EVENING 100 each 5   • Elastic Bandages & Supports (Medical Compression Stockings) MISC Use daily Knee High 15-20mmHg (Patient not taking: Reported on 2/1/2024) 2 each 4   • Empagliflozin 25 MG TABS Take 1 tablet (25 mg total) by mouth daily 90 tablet 1   • fenofibrate 160 MG tablet Take 1 tablet (160 mg total) by mouth daily 90 tablet 3   • ferrous sulfate 324 (65 Fe) mg Take 1 tablet (324 mg total) by  mouth daily 90 tablet 0   • gabapentin (NEURONTIN) 600 MG tablet TAKE 1 TABLET BY MOUTH  TWICE DAILY 180 tablet 3   • glimepiride (AMARYL) 2 mg tablet Take 1 tablet (2 mg total) by mouth 2 (two) times a day 180 tablet 3   • glucose blood (OneTouch Verio) test strip TEST TWICE A  strip 5   • insulin glargine (LANTUS) 100 units/mL subcutaneous injection Inject 15 Units under the skin daily at bedtime 10 mL 0   • isosorbide mononitrate (IMDUR) 60 mg 24 hr tablet Take 1 tablet (60 mg total) by mouth daily 90 tablet 3   • metFORMIN (GLUCOPHAGE) 500 mg tablet Take 500 mg by mouth 2 (two) times a day with meals     • metoprolol tartrate (LOPRESSOR) 50 mg tablet Take 1 tablet (50 mg total) by mouth every 12 (twelve) hours 180 tablet 3   • Multiple Vitamins-Minerals (MULTIVITAMIN MEN 50+ PO) Take by mouth daily     • nitroglycerin (NITROSTAT) 0.4 mg SL tablet Place 1 tablet (0.4 mg total) under the tongue every 5 (five) minutes as needed for chest pain 30 tablet 3   • Omega-3 Fatty Acids (fish oil) 1,000 mg Take 4,000 mg by mouth 2 (two) times a day     • ondansetron (ZOFRAN) 4 mg tablet Take 4 mg by mouth every 8 (eight) hours as needed for nausea or vomiting     • OneTouch Delica Lancets 33G MISC Check blood sugars twice daily. Please substitute with appropriate alternative as covered by patient's insurance. Dx: E11.65 (Patient not taking: Reported on 2/1/2024) 200 each 3   • pantoprazole (PROTONIX) 40 mg tablet Take 1 tablet (40 mg total) by mouth 2 (two) times a day 180 tablet 0   • polyethylene glycol (GOLYTELY) 4000 mL solution Take 4,000 mL by mouth once for 1 dose 4000 mL 0   • ranolazine (RANEXA) 500 mg 12 hr tablet take 1 tablet by mouth twice a day 60 tablet 3   • sitaGLIPtin (JANUVIA) 100 mg tablet Take 1 tablet (100 mg total) by mouth daily 90 tablet 1   • torsemide (DEMADEX) 20 mg tablet Take 1 tablet (20 mg total) by mouth daily 90 tablet 3     No current facility-administered medications for this  visit.       Past Medical History  Past Medical History:   Diagnosis Date   • Anemia    • CAD (coronary artery disease)    • Cancer (HCC)     prostate   • CHF (congestive heart failure) (HCC)    • Chronic kidney disease    • Diabetes mellitus (HCC)    • Duodenal ulcer    • Hyperlipidemia    • Hypertension    • Myocardial infarction (HCC)    • Neuropathy     Bilateral feet   • Sleep apnea     Could not tolerate CPAP       Past Surgical History  Past Surgical History:   Procedure Laterality Date   • ADENOIDECTOMY     • CARDIAC CATHETERIZATION Left 10/19/2022    Procedure: Cardiac Left Heart Cath;  Surgeon: Danielle Pereira MD;  [facility];  Service: Cardiology   • CARDIAC SURGERY      3 cardiac bypass then angioplasty 2020   • CHOLECYSTECTOMY     • COLONOSCOPY     • CORONARY ARTERY BYPASS GRAFT     • IR LOWER EXTREMITY ANGIOGRAM  2023   • [place] BYPASS W/VEIN FEMORAL-POPLITEAL Right 2023    Procedure: BYPASS FEMORAL-POPLITEAL WITH CRYO VEIN, RIGHT FEMORAL ENDARTERECTOMY;  Surgeon: Vasquez Clark MD;  [facility];  Service: Vascular   • WI SLCTV CATHJ 3RD+ ORD SLCTV ABDL PEL/LXTR BRNCH Right 2023    Procedure: ARTERIOGRAM Right lower extremity arteriogram with CO2 via right groin access;  Surgeon: Vasquez Clark MD;  [facility];  Service: Vascular   • PROSTATE SURGERY     • TONSILLECTOMY         Past Social History   Social History     Socioeconomic History   • Marital status:      Spouse name: Not on file   • Number of children: 2   • Years of education: Not on file   • Highest education level: Some college, no degree   Occupational History   • Not on file   Tobacco Use   • Smoking status: Former     Current packs/day: 0.00     Average packs/day: 1.5 packs/day for 33.0 years (49.5 ttl pk-yrs)     Types: Cigarettes     Start date:      Quit date:      Years since quittin.1   • Smokeless tobacco: Never   Vaping Use   •  Vaping status: Never Used   Substance and Sexual Activity   • Alcohol use: Yes     Alcohol/week: 14.0 standard drinks of alcohol     Types: 14 Cans of beer per week     Comment: 1 -2 daily   • Drug use: Never   • Sexual activity: Not on file   Other Topics Concern   • Not on file   Social History Narrative   • Not on file     Social Determinants of Health     Financial Resource Strain: Low Risk  (10/31/2023)    Overall Financial Resource Strain (CARDIA)    • Difficulty of Paying Living Expenses: Not hard at all   Food Insecurity: Not on file   Transportation Needs: No Transportation Needs (10/31/2023)    PRAPARE - Transportation    • Lack of Transportation (Medical): No    • Lack of Transportation (Non-Medical): No   Physical Activity: Not on file   Stress: Not on file   Social Connections: Not on file   Intimate Partner Violence: Not on file   Housing Stability: Not on file       Vital Signs  There were no vitals filed for this visit.      Physical Exam:  General appearance: alert, cooperative, no distress  HEENT: normocephalic, anicteric, no eye erythema or discharge, no oropharyngeal thrush  Neck: supple  Lungs: CTA b/l, no rales, rhonchi, or wheezing, unlabored respirations  Heart: RRR, no murmur, rubs, or gallops  Abdomen: soft, non-tender, non-distended, normal bowel sounds, no masses or organomegaly  Rectal: deferred  Extremities: no cyanosis, clubbing, or edema  Musculoskeletal: normal gait  Skin: color and texture normal, no jaundice, no rashes or lesions  Psychiatric: alert and oriented, normal affect and behavior                                                          Tj Paulino PA-C

## 2024-02-25 DIAGNOSIS — I49.3 PVC (PREMATURE VENTRICULAR CONTRACTION): ICD-10-CM

## 2024-02-26 RX ORDER — RANOLAZINE 500 MG/1
TABLET, EXTENDED RELEASE ORAL
Qty: 60 TABLET | Refills: 3 | Status: SHIPPED | OUTPATIENT
Start: 2024-02-26

## 2024-02-27 ENCOUNTER — OFFICE VISIT (OUTPATIENT)
Age: 79
End: 2024-02-27
Payer: COMMERCIAL

## 2024-02-27 VITALS
RESPIRATION RATE: 18 BRPM | SYSTOLIC BLOOD PRESSURE: 114 MMHG | BODY MASS INDEX: 29.65 KG/M2 | DIASTOLIC BLOOD PRESSURE: 62 MMHG | WEIGHT: 231 LBS | HEIGHT: 74 IN | HEART RATE: 54 BPM

## 2024-02-27 DIAGNOSIS — L97.411 DIABETIC ULCER OF RIGHT HEEL ASSOCIATED WITH TYPE 2 DIABETES MELLITUS, LIMITED TO BREAKDOWN OF SKIN (HCC): Primary | ICD-10-CM

## 2024-02-27 DIAGNOSIS — I70.209 PERIPHERAL ARTERIOSCLEROSIS (HCC): ICD-10-CM

## 2024-02-27 DIAGNOSIS — L89.616 PRESSURE INJURY OF DEEP TISSUE OF RIGHT HEEL: ICD-10-CM

## 2024-02-27 DIAGNOSIS — E11.42 DIABETIC POLYNEUROPATHY ASSOCIATED WITH TYPE 2 DIABETES MELLITUS (HCC): ICD-10-CM

## 2024-02-27 DIAGNOSIS — E11.621 DIABETIC ULCER OF RIGHT HEEL ASSOCIATED WITH TYPE 2 DIABETES MELLITUS, LIMITED TO BREAKDOWN OF SKIN (HCC): Primary | ICD-10-CM

## 2024-02-27 DIAGNOSIS — L90.5 CICATRIX: ICD-10-CM

## 2024-02-27 DIAGNOSIS — M79.671 RIGHT FOOT PAIN: ICD-10-CM

## 2024-02-27 DIAGNOSIS — B35.1 ONYCHOMYCOSIS: ICD-10-CM

## 2024-02-27 PROCEDURE — 99214 OFFICE O/P EST MOD 30 MIN: CPT | Performed by: PODIATRIST

## 2024-02-27 NOTE — PROGRESS NOTES
Assessment/Plan: Pressure injury plantar aspect right heel.  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  Diabetic patient with neuropathy.  Peripheral artery disease.  Pain upon ambulation.  Mycosis of nail.    Plan.  Chart reviewed.  Primary care notes reviewed.  Vascular surgery notes reviewed.  Doppler results reviewed.  Patient examined.  Patient advised on condition.  At this time there is no evidence of acute infection however patient has a Sumner grade 0 ulcer/cicatrix on the plantar aspect of his right heel.  Patient advised on daily bandaging and aftercare instruction.  He will obtain insoles for his shoes.  Patient advised on diabetic footcare after performing diabetic foot exam.  Patient watch for signs of infection.  Foot hygiene and nail cutting techniques offered.  Return for follow-up.         Diagnoses and all orders for this visit:    Diabetic ulcer of right heel associated with type 2 diabetes mellitus, limited to breakdown of skin (HCC)    Diabetic polyneuropathy associated with type 2 diabetes mellitus (HCC)    Peripheral arteriosclerosis (HCC)    Pressure injury of deep tissue of right heel    Right foot pain    Onychomycosis    Cicatrix          Subjective: Patient is diabetic.  He is status post arteriogram of the right lower extremity.  Patient has chronic pain of his right heel.  He has history of injury whereby he stepped on a nail and induced a wound.  He has been following with vascular surgery.  He is doing well.  Otherwise he has pain in his right heel upon ambulation.  No history of recent trauma.    Allergies   Allergen Reactions    Lisinopril Rash and Lip Swelling         Current Outpatient Medications:     acetaminophen (TYLENOL) 325 mg tablet, Take 2 tablets (650 mg total) by mouth every 6 (six) hours as needed for mild pain, Disp: , Rfl: 0    amLODIPine (NORVASC) 10 mg tablet, Take 0.5 tablets (5 mg total) by mouth daily, Disp: 1 tablet, Rfl: 1    aspirin 81 mg  chewable tablet, Chew 81 mg daily, Disp: , Rfl:     atorvastatin (LIPITOR) 40 mg tablet, Take 1 tablet (40 mg total) by mouth daily (Patient taking differently: Take 40 mg by mouth every evening), Disp: 90 tablet, Rfl: 3    Blood Glucose Monitoring Suppl (OneTouch Verio Reflect) w/Device KIT, Check blood sugars twice daily. Please substitute with appropriate alternative as covered by patient's insurance. Dx: E11.65 (Patient not taking: Reported on 2/1/2024), Disp: 1 kit, Rfl: 0    cloNIDine (CATAPRES) 0.1 mg tablet, take 1 tablet by mouth every 12 hours, Disp: 180 tablet, Rfl: 1    clopidogrel (PLAVIX) 75 mg tablet, TAKE 1 TABLET BY MOUTH DAILY, Disp: 90 tablet, Rfl: 3    Droplet Pen Needles 32G X 4 MM MISC, USE EVERY EVENING, Disp: 100 each, Rfl: 5    Elastic Bandages & Supports (Medical Compression Stockings) MISC, Use daily Knee High 15-20mmHg (Patient not taking: Reported on 2/1/2024), Disp: 2 each, Rfl: 4    Empagliflozin 25 MG TABS, Take 1 tablet (25 mg total) by mouth daily, Disp: 90 tablet, Rfl: 1    fenofibrate 160 MG tablet, Take 1 tablet (160 mg total) by mouth daily, Disp: 90 tablet, Rfl: 3    ferrous sulfate 324 (65 Fe) mg, Take 1 tablet (324 mg total) by mouth daily, Disp: 90 tablet, Rfl: 0    gabapentin (NEURONTIN) 600 MG tablet, TAKE 1 TABLET BY MOUTH  TWICE DAILY, Disp: 180 tablet, Rfl: 3    glimepiride (AMARYL) 2 mg tablet, Take 1 tablet (2 mg total) by mouth 2 (two) times a day, Disp: 180 tablet, Rfl: 3    glucose blood (OneTouch Verio) test strip, TEST TWICE A DAY, Disp: 200 strip, Rfl: 5    insulin glargine (LANTUS) 100 units/mL subcutaneous injection, Inject 15 Units under the skin daily at bedtime, Disp: 10 mL, Rfl: 0    isosorbide mononitrate (IMDUR) 60 mg 24 hr tablet, Take 1 tablet (60 mg total) by mouth daily, Disp: 90 tablet, Rfl: 3    metFORMIN (GLUCOPHAGE) 500 mg tablet, Take 500 mg by mouth 2 (two) times a day with meals, Disp: , Rfl:     metoprolol tartrate (LOPRESSOR) 50 mg tablet,  Take 1 tablet (50 mg total) by mouth every 12 (twelve) hours, Disp: 180 tablet, Rfl: 3    Multiple Vitamins-Minerals (MULTIVITAMIN MEN 50+ PO), Take by mouth daily, Disp: , Rfl:     nitroglycerin (NITROSTAT) 0.4 mg SL tablet, Place 1 tablet (0.4 mg total) under the tongue every 5 (five) minutes as needed for chest pain, Disp: 30 tablet, Rfl: 3    Omega-3 Fatty Acids (fish oil) 1,000 mg, Take 4,000 mg by mouth 2 (two) times a day, Disp: , Rfl:     ondansetron (ZOFRAN) 4 mg tablet, Take 4 mg by mouth every 8 (eight) hours as needed for nausea or vomiting, Disp: , Rfl:     OneTouch Delica Lancets 33G MISC, Check blood sugars twice daily. Please substitute with appropriate alternative as covered by patient's insurance. Dx: E11.65 (Patient not taking: Reported on 2/1/2024), Disp: 200 each, Rfl: 3    pantoprazole (PROTONIX) 40 mg tablet, Take 1 tablet (40 mg total) by mouth 2 (two) times a day, Disp: 180 tablet, Rfl: 0    polyethylene glycol (GOLYTELY) 4000 mL solution, Take 4,000 mL by mouth once for 1 dose (Patient not taking: Reported on 2/22/2024), Disp: 4000 mL, Rfl: 0    ranolazine (RANEXA) 500 mg 12 hr tablet, take 1 tablet by mouth twice a day, Disp: 60 tablet, Rfl: 3    sitaGLIPtin (JANUVIA) 100 mg tablet, Take 1 tablet (100 mg total) by mouth daily, Disp: 90 tablet, Rfl: 1    torsemide (DEMADEX) 20 mg tablet, Take 1 tablet (20 mg total) by mouth daily, Disp: 90 tablet, Rfl: 3    Patient Active Problem List   Diagnosis    Mixed hyperlipidemia    Primary hypertension    Coronary artery disease involving native coronary artery of native heart without angina pectoris    S/P angioplasty with stent    Hx of CABG    S/P AAA repair    S/P prostatectomy    H/O prostate cancer    Type 2 diabetes mellitus with diabetic polyneuropathy, with long-term current use of insulin (HCC)    Varicose veins of left lower extremity    History of endovascular stent graft for abdominal aortic aneurysm (AAA)    Bruit (arterial)     Peripheral artery disease (HCC)    Type 2 diabetes mellitus with diabetic peripheral angiopathy without gangrene, with long-term current use of insulin (HCC)    Actinic keratoses    Acute kidney injury superimposed on chronic kidney disease     Platelets decreased (HCC)    Gross hematuria    Chronic HFpEF/Moderate pHTN (HFpEF) (HCC)    Mild aortic stenosis    Chronic kidney disease-mineral and bone disorder    Stable angina pectoris    Hypertensive urgency    Elevated troponin level not due myocardial infarction    Diabetic ulcer of right midfoot associated with diabetes mellitus due to underlying condition, limited to breakdown of skin (HCC)    Acute on chronic diastolic heart failure (HCC)    Acute kidney injury superimposed on chronic kidney disease     Frequent PVCs    Acute respiratory distress    Melena    Symptomatic anemia    Multiple gastric ulcers    Iron deficiency anemia due to chronic blood loss    Duodenal ulcer    Acute blood loss anemia    History of colon polyps          Patient ID: Thomas Horne is a 79 y.o. male.    HPI    The following portions of the patient's history were reviewed and updated as appropriate:     family history includes Alcohol abuse in his father; Diabetes in his mother.      reports that he quit smoking about 36 years ago. His smoking use included cigarettes. He started smoking about 68 years ago. He has a 49.5 pack-year smoking history. He has never used smokeless tobacco. He reports current alcohol use of about 14.0 standard drinks of alcohol per week. He reports that he does not use drugs.    Vitals:    02/27/24 1637   BP: 114/62   Pulse: (!) 54   Resp: 18       Review of Systems      Objective:  Patient's shoes and socks removed.   Foot Exam    Right Foot/Ankle     Inspection and Palpation  Skin Exam: abnormal color and erythema;       Left Foot/Ankle      Inspection and Palpation  Skin Exam: abnormal color and erythema;       Physical Exam  Cardiovascular:      Pulses:  Pulses are weak.   Feet:      Right foot:      Skin integrity: Erythema present.      Left foot:      Skin integrity: Erythema present.             General  General Appearance: appears stated age and healthy   Orientation: alert and oriented to person, place, and time   Affect: appropriate   Gait: antalgic         Right Foot/Ankle      Inspection and Palpation  Tenderness: metatarsals   Swelling: none   Arch: pes planus  Claw Toes: fifth toe  Hallux limitus: yes  Skin Exam: callus, dry skin and tinea;      Neurovascular  Dorsalis pedis: 1+  Posterior tibial: 1+  Saphenous nerve sensation: absent  Tibial nerve sensation: absent  Superficial peroneal nerve sensation: absent  Deep peroneal nerve sensation: absent  Sural nerve sensation: absent        Left Foot/Ankle       Inspection and Palpation  Tenderness: metatarsals   Swelling: none   Arch: pes planus  Claw toes: second toe and fifth toe  Hallux limitus: yes  Skin Exam: callus, dry skin and tinea;      Neurovascular  Dorsalis pedis: 1+  Posterior tibial: 1+  Saphenous nerve sensation: absent  Tibial nerve sensation: absent  Superficial peroneal nerve sensation: absent  Deep peroneal nerve sensation: absent  Sural nerve sensation: absent           Physical Exam  Vitals signs and nursing note reviewed.   Cardiovascular:      Rate and Rhythm: Normal rate and regular rhythm.      Pulses:           Dorsalis pedis pulses are 1+ on the right side and 1+ on the left side.        Posterior tibial pulses are 1+ on the right side and 1+ on the left side.   Feet:      Right foot:      Skin integrity: Callus and dry skin present.      Left foot:      Skin integrity: Callus and dry skin present.   Skin:     Capillary Refill: Capillary refill takes 2 to 3 seconds.      Comments:   All nails are thick and mycotic.  Patient demonstrates bilateral dermatophytosis.  There is a pre ulcerative/ corn on the plantar aspect 2nd left toe.    Right Foot/Ankle   Right Foot Inspection  Skin  Exam: dry skin, callus and callus                                 Vascular     The right DP pulse is 1+. The right PT pulse is 1+.   Right Toe  - Comprehensive Exam  Arch: pes planus  Claw Toes: fifth toe  Hallux limitus: yes  Swelling: none   Tenderness: metatarsals            Left Foot/Ankle  Left Foot Inspection  Skin Exam: dry skin and callus                                             Vascular     The left DP pulse is 1+. The left PT pulse is 1+.   Left Toe  - Comprehensive Exam  Arch: pes planus  Claw toes: second toe and fifth toe  Hallux limitus: yes  Swelling: none   Tenderness: metatarsals.    Patient's shoes and socks removed.    Right Foot/Ankle   Right Foot Inspection  Skin Exam: erythema and abnormal color.     Toe Exam: tenderness and right toe deformity.     Sensory   Vibration: diminished  Proprioception: diminished  Monofilament testing: diminished    Vascular  Capillary refills: < 3 seconds      Left Foot/Ankle  Left Foot Inspection  Skin Exam: erythema and abnormal color.     Toe Exam: tenderness and left toe deformity.     Sensory   Vibration: diminished  Proprioception: diminished  Monofilament testing: diminished    Vascular  Capillary refills: < 3 seconds      Assign Risk Category  Deformity present  Loss of protective sensation  Weak pulses  Risk: 2

## 2024-03-01 ENCOUNTER — TELEPHONE (OUTPATIENT)
Dept: GASTROENTEROLOGY | Facility: CLINIC | Age: 79
End: 2024-03-01

## 2024-03-01 NOTE — TELEPHONE ENCOUNTER
Pt called in regards to medication ferrous sulfate 324 (65 Fe) mg. Pt wanted to refill medication with the right directions. Informed pt that the correct directions was sent to the pharmacy on 02/14/2024 at 11:25 am and to contact [facility] to fill script.

## 2024-03-15 ENCOUNTER — APPOINTMENT (OUTPATIENT)
Dept: LAB | Facility: CLINIC | Age: 79
End: 2024-03-15
Payer: COMMERCIAL

## 2024-03-15 DIAGNOSIS — D50.0 IRON DEFICIENCY ANEMIA DUE TO CHRONIC BLOOD LOSS: ICD-10-CM

## 2024-03-15 DIAGNOSIS — K92.1 MELENA: ICD-10-CM

## 2024-03-15 DIAGNOSIS — K92.2 GI BLEED: ICD-10-CM

## 2024-03-15 DIAGNOSIS — D64.9 SYMPTOMATIC ANEMIA: ICD-10-CM

## 2024-03-15 LAB
BASOPHILS # BLD AUTO: 0.03 THOUSANDS/ÂΜL (ref 0–0.1)
BASOPHILS NFR BLD AUTO: 1 % (ref 0–1)
EOSINOPHIL # BLD AUTO: 0.16 THOUSAND/ÂΜL (ref 0–0.61)
EOSINOPHIL NFR BLD AUTO: 2 % (ref 0–6)
ERYTHROCYTE [DISTWIDTH] IN BLOOD BY AUTOMATED COUNT: 17.8 % (ref 11.6–15.1)
HCT VFR BLD AUTO: 32.4 % (ref 36.5–49.3)
HGB BLD-MCNC: 8.8 G/DL (ref 12–17)
IMM GRANULOCYTES # BLD AUTO: 0.03 THOUSAND/UL (ref 0–0.2)
IMM GRANULOCYTES NFR BLD AUTO: 1 % (ref 0–2)
LYMPHOCYTES # BLD AUTO: 1.23 THOUSANDS/ÂΜL (ref 0.6–4.47)
LYMPHOCYTES NFR BLD AUTO: 19 % (ref 14–44)
MCH RBC QN AUTO: 20.2 PG (ref 26.8–34.3)
MCHC RBC AUTO-ENTMCNC: 27.2 G/DL (ref 31.4–37.4)
MCV RBC AUTO: 75 FL (ref 82–98)
MONOCYTES # BLD AUTO: 0.39 THOUSAND/ÂΜL (ref 0.17–1.22)
MONOCYTES NFR BLD AUTO: 6 % (ref 4–12)
NEUTROPHILS # BLD AUTO: 4.74 THOUSANDS/ÂΜL (ref 1.85–7.62)
NEUTS SEG NFR BLD AUTO: 71 % (ref 43–75)
NRBC BLD AUTO-RTO: 0 /100 WBCS
PLATELET # BLD AUTO: 244 THOUSANDS/UL (ref 149–390)
PMV BLD AUTO: 11.7 FL (ref 8.9–12.7)
RBC # BLD AUTO: 4.35 MILLION/UL (ref 3.88–5.62)
WBC # BLD AUTO: 6.58 THOUSAND/UL (ref 4.31–10.16)

## 2024-03-15 PROCEDURE — 85025 COMPLETE CBC W/AUTO DIFF WBC: CPT

## 2024-03-15 PROCEDURE — 36415 COLL VENOUS BLD VENIPUNCTURE: CPT

## 2024-03-18 ENCOUNTER — TELEPHONE (OUTPATIENT)
Age: 79
End: 2024-03-18

## 2024-03-18 DIAGNOSIS — K92.2 GI BLEED: ICD-10-CM

## 2024-03-18 DIAGNOSIS — D64.9 SYMPTOMATIC ANEMIA: ICD-10-CM

## 2024-03-18 DIAGNOSIS — D50.0 IRON DEFICIENCY ANEMIA DUE TO CHRONIC BLOOD LOSS: ICD-10-CM

## 2024-03-18 DIAGNOSIS — K92.1 MELENA: ICD-10-CM

## 2024-03-18 RX ORDER — FERROUS SULFATE 324(65)MG
324 TABLET, DELAYED RELEASE (ENTERIC COATED) ORAL
Qty: 180 TABLET | Refills: 0 | Status: SHIPPED | OUTPATIENT
Start: 2024-03-18 | End: 2024-06-16

## 2024-03-18 NOTE — TELEPHONE ENCOUNTER
Spoke with pt still with microcytic anemia.  He takes 1 iron pill a day.  Will increase to two.  Follow cbc in two weeks  No blood in stool and they are not dark  Pt still tired  Pt may need to see hematology to be evaluated for IV iron

## 2024-03-18 NOTE — TELEPHONE ENCOUNTER
Patient requesting a call back from Dr. Lombardi. He would like to discuss his hemoglobin. He states that he is supposed to be trending up , but it is not the case at the moment. Please call back.  Jemiam Kc

## 2024-04-02 ENCOUNTER — APPOINTMENT (OUTPATIENT)
Dept: LAB | Facility: CLINIC | Age: 79
End: 2024-04-02
Payer: COMMERCIAL

## 2024-04-02 DIAGNOSIS — D50.0 IRON DEFICIENCY ANEMIA SECONDARY TO BLOOD LOSS (CHRONIC): ICD-10-CM

## 2024-04-02 DIAGNOSIS — K92.1 MELENA: ICD-10-CM

## 2024-04-02 DIAGNOSIS — K92.2 GASTROINTESTINAL HEMORRHAGE, UNSPECIFIED: ICD-10-CM

## 2024-04-02 DIAGNOSIS — D64.9 ANEMIA, UNSPECIFIED: ICD-10-CM

## 2024-04-02 LAB
BASOPHILS # BLD AUTO: 0.03 THOUSANDS/ÂΜL (ref 0–0.1)
BASOPHILS NFR BLD AUTO: 0 % (ref 0–1)
EOSINOPHIL # BLD AUTO: 0.18 THOUSAND/ÂΜL (ref 0–0.61)
EOSINOPHIL NFR BLD AUTO: 3 % (ref 0–6)
ERYTHROCYTE [DISTWIDTH] IN BLOOD BY AUTOMATED COUNT: 19.1 % (ref 11.6–15.1)
HCT VFR BLD AUTO: 34.3 % (ref 36.5–49.3)
HGB BLD-MCNC: 9.4 G/DL (ref 12–17)
IMM GRANULOCYTES # BLD AUTO: 0.02 THOUSAND/UL (ref 0–0.2)
IMM GRANULOCYTES NFR BLD AUTO: 0 % (ref 0–2)
LYMPHOCYTES # BLD AUTO: 1.55 THOUSANDS/ÂΜL (ref 0.6–4.47)
LYMPHOCYTES NFR BLD AUTO: 22 % (ref 14–44)
MCH RBC QN AUTO: 19.8 PG (ref 26.8–34.3)
MCHC RBC AUTO-ENTMCNC: 27.4 G/DL (ref 31.4–37.4)
MCV RBC AUTO: 72 FL (ref 82–98)
MONOCYTES # BLD AUTO: 0.45 THOUSAND/ÂΜL (ref 0.17–1.22)
MONOCYTES NFR BLD AUTO: 6 % (ref 4–12)
NEUTROPHILS # BLD AUTO: 4.75 THOUSANDS/ÂΜL (ref 1.85–7.62)
NEUTS SEG NFR BLD AUTO: 69 % (ref 43–75)
NRBC BLD AUTO-RTO: 0 /100 WBCS
PLATELET # BLD AUTO: 402 THOUSANDS/UL (ref 149–390)
PMV BLD AUTO: 10.9 FL (ref 8.9–12.7)
RBC # BLD AUTO: 4.74 MILLION/UL (ref 3.88–5.62)
WBC # BLD AUTO: 6.98 THOUSAND/UL (ref 4.31–10.16)

## 2024-04-02 PROCEDURE — 36415 COLL VENOUS BLD VENIPUNCTURE: CPT

## 2024-04-02 PROCEDURE — 85025 COMPLETE CBC W/AUTO DIFF WBC: CPT

## 2024-04-04 ENCOUNTER — TELEPHONE (OUTPATIENT)
Age: 79
End: 2024-04-04

## 2024-04-04 DIAGNOSIS — D50.0 IRON DEFICIENCY ANEMIA DUE TO CHRONIC BLOOD LOSS: Primary | ICD-10-CM

## 2024-04-04 NOTE — TELEPHONE ENCOUNTER
He is 9.4 which is a fairly good rise in that short period of time.  If he is still symp[tomatic lets have him see hematology and see if I am missing something.  I will place an order for DR Main.  He has labs in the cart that are due to be done about a month ago.  Lets have him get those and sched a diabetic follow up.  I will place the order for the hematologist

## 2024-04-04 NOTE — TELEPHONE ENCOUNTER
Pt called in stating that his hemoglobin level only improved slightly after increasing his iron pill to BID. Pt states he still feels very tired. Pt also would like to know when Dr. Lombardi wants him to come in for his diabetes management appt. Please have Dr. Lombardi advise and call pt back.

## 2024-04-08 ENCOUNTER — APPOINTMENT (EMERGENCY)
Dept: RADIOLOGY | Facility: HOSPITAL | Age: 79
DRG: 377 | End: 2024-04-08
Payer: COMMERCIAL

## 2024-04-08 ENCOUNTER — HOSPITAL ENCOUNTER (INPATIENT)
Facility: HOSPITAL | Age: 79
LOS: 2 days | Discharge: HOME | DRG: 377 | End: 2024-04-11
Attending: EMERGENCY MEDICINE | Admitting: INTERNAL MEDICINE
Payer: COMMERCIAL

## 2024-04-08 DIAGNOSIS — K92.2 GI BLEED: ICD-10-CM

## 2024-04-08 DIAGNOSIS — D62 ACUTE BLOOD LOSS ANEMIA: ICD-10-CM

## 2024-04-08 DIAGNOSIS — R07.9 CHEST PAIN: ICD-10-CM

## 2024-04-08 DIAGNOSIS — K26.9 DUODENAL ULCER: ICD-10-CM

## 2024-04-08 DIAGNOSIS — D50.0 IRON DEFICIENCY ANEMIA DUE TO CHRONIC BLOOD LOSS: ICD-10-CM

## 2024-04-08 DIAGNOSIS — D64.9 ANEMIA: ICD-10-CM

## 2024-04-08 DIAGNOSIS — Z95.1 HX OF CABG: ICD-10-CM

## 2024-04-08 DIAGNOSIS — I12.9 BENIGN HYPERTENSION WITH CKD (CHRONIC KIDNEY DISEASE) STAGE III (HCC): ICD-10-CM

## 2024-04-08 DIAGNOSIS — N17.9 ACUTE ON CHRONIC KIDNEY FAILURE  (HCC): ICD-10-CM

## 2024-04-08 DIAGNOSIS — R06.02 SOB (SHORTNESS OF BREATH): Primary | ICD-10-CM

## 2024-04-08 DIAGNOSIS — N18.9 ACUTE ON CHRONIC KIDNEY FAILURE  (HCC): ICD-10-CM

## 2024-04-08 DIAGNOSIS — N18.30 BENIGN HYPERTENSION WITH CKD (CHRONIC KIDNEY DISEASE) STAGE III (HCC): ICD-10-CM

## 2024-04-08 DIAGNOSIS — K92.1 MELENA: ICD-10-CM

## 2024-04-08 LAB
2HR DELTA HS TROPONIN: -10 NG/L
ALBUMIN SERPL BCP-MCNC: 4.2 G/DL (ref 3.5–5)
ALP SERPL-CCNC: 37 U/L (ref 34–104)
ALT SERPL W P-5'-P-CCNC: 12 U/L (ref 7–52)
ANION GAP SERPL CALCULATED.3IONS-SCNC: 6 MMOL/L (ref 4–13)
AST SERPL W P-5'-P-CCNC: 16 U/L (ref 13–39)
BASOPHILS # BLD AUTO: 0.03 THOUSANDS/ÂΜL (ref 0–0.1)
BASOPHILS NFR BLD AUTO: 0 % (ref 0–1)
BILIRUB SERPL-MCNC: 0.34 MG/DL (ref 0.2–1)
BNP SERPL-MCNC: 729 PG/ML (ref 0–100)
BUN SERPL-MCNC: 37 MG/DL (ref 5–25)
CALCIUM SERPL-MCNC: 9.2 MG/DL (ref 8.4–10.2)
CARDIAC TROPONIN I PNL SERPL HS: 19 NG/L
CARDIAC TROPONIN I PNL SERPL HS: 29 NG/L
CHLORIDE SERPL-SCNC: 103 MMOL/L (ref 96–108)
CO2 SERPL-SCNC: 29 MMOL/L (ref 21–32)
CREAT SERPL-MCNC: 2.04 MG/DL (ref 0.6–1.3)
EOSINOPHIL # BLD AUTO: 0.21 THOUSAND/ÂΜL (ref 0–0.61)
EOSINOPHIL NFR BLD AUTO: 3 % (ref 0–6)
ERYTHROCYTE [DISTWIDTH] IN BLOOD BY AUTOMATED COUNT: 19.8 % (ref 11.6–15.1)
GFR SERPL CREATININE-BSD FRML MDRD: 29 ML/MIN/1.73SQ M
GLUCOSE SERPL-MCNC: 137 MG/DL (ref 65–140)
HCT VFR BLD AUTO: 33.1 % (ref 36.5–49.3)
HGB BLD-MCNC: 9.1 G/DL (ref 12–17)
IMM GRANULOCYTES # BLD AUTO: 0.03 THOUSAND/UL (ref 0–0.2)
IMM GRANULOCYTES NFR BLD AUTO: 0 % (ref 0–2)
LYMPHOCYTES # BLD AUTO: 1.73 THOUSANDS/ÂΜL (ref 0.6–4.47)
LYMPHOCYTES NFR BLD AUTO: 20 % (ref 14–44)
MCH RBC QN AUTO: 19.9 PG (ref 26.8–34.3)
MCHC RBC AUTO-ENTMCNC: 27.5 G/DL (ref 31.4–37.4)
MCV RBC AUTO: 72 FL (ref 82–98)
MONOCYTES # BLD AUTO: 0.59 THOUSAND/ÂΜL (ref 0.17–1.22)
MONOCYTES NFR BLD AUTO: 7 % (ref 4–12)
NEUTROPHILS # BLD AUTO: 5.89 THOUSANDS/ÂΜL (ref 1.85–7.62)
NEUTS SEG NFR BLD AUTO: 70 % (ref 43–75)
NRBC BLD AUTO-RTO: 0 /100 WBCS
PLATELET # BLD AUTO: 314 THOUSANDS/UL (ref 149–390)
PMV BLD AUTO: 10.4 FL (ref 8.9–12.7)
POTASSIUM SERPL-SCNC: 4 MMOL/L (ref 3.5–5.3)
PROT SERPL-MCNC: 7.8 G/DL (ref 6.4–8.4)
RBC # BLD AUTO: 4.58 MILLION/UL (ref 3.88–5.62)
SODIUM SERPL-SCNC: 138 MMOL/L (ref 135–147)
WBC # BLD AUTO: 8.48 THOUSAND/UL (ref 4.31–10.16)

## 2024-04-08 PROCEDURE — 83540 ASSAY OF IRON: CPT | Performed by: NURSE PRACTITIONER

## 2024-04-08 PROCEDURE — 83036 HEMOGLOBIN GLYCOSYLATED A1C: CPT | Performed by: NURSE PRACTITIONER

## 2024-04-08 PROCEDURE — C9113 INJ PANTOPRAZOLE SODIUM, VIA: HCPCS

## 2024-04-08 PROCEDURE — 99222 1ST HOSP IP/OBS MODERATE 55: CPT | Performed by: NURSE PRACTITIONER

## 2024-04-08 PROCEDURE — 99285 EMERGENCY DEPT VISIT HI MDM: CPT

## 2024-04-08 PROCEDURE — 83550 IRON BINDING TEST: CPT | Performed by: NURSE PRACTITIONER

## 2024-04-08 PROCEDURE — 71045 X-RAY EXAM CHEST 1 VIEW: CPT

## 2024-04-08 PROCEDURE — 99285 EMERGENCY DEPT VISIT HI MDM: CPT | Performed by: EMERGENCY MEDICINE

## 2024-04-08 PROCEDURE — 93005 ELECTROCARDIOGRAM TRACING: CPT

## 2024-04-08 PROCEDURE — 84484 ASSAY OF TROPONIN QUANT: CPT | Performed by: EMERGENCY MEDICINE

## 2024-04-08 PROCEDURE — 83880 ASSAY OF NATRIURETIC PEPTIDE: CPT | Performed by: NURSE PRACTITIONER

## 2024-04-08 PROCEDURE — 82728 ASSAY OF FERRITIN: CPT | Performed by: NURSE PRACTITIONER

## 2024-04-08 PROCEDURE — 36415 COLL VENOUS BLD VENIPUNCTURE: CPT

## 2024-04-08 PROCEDURE — 80053 COMPREHEN METABOLIC PANEL: CPT | Performed by: EMERGENCY MEDICINE

## 2024-04-08 PROCEDURE — 85025 COMPLETE CBC W/AUTO DIFF WBC: CPT | Performed by: EMERGENCY MEDICINE

## 2024-04-08 RX ORDER — CLONIDINE HYDROCHLORIDE 0.1 MG/1
0.1 TABLET ORAL EVERY 12 HOURS SCHEDULED
Status: DISCONTINUED | OUTPATIENT
Start: 2024-04-09 | End: 2024-04-11 | Stop reason: HOSPADM

## 2024-04-08 RX ORDER — FENOFIBRATE 145 MG/1
145 TABLET, COATED ORAL DAILY
Status: DISCONTINUED | OUTPATIENT
Start: 2024-04-09 | End: 2024-04-11 | Stop reason: HOSPADM

## 2024-04-08 RX ORDER — ATORVASTATIN CALCIUM 40 MG/1
40 TABLET, FILM COATED ORAL EVERY EVENING
Status: DISCONTINUED | OUTPATIENT
Start: 2024-04-09 | End: 2024-04-11 | Stop reason: HOSPADM

## 2024-04-08 RX ORDER — ACETAMINOPHEN 325 MG/1
650 TABLET ORAL EVERY 6 HOURS PRN
Status: DISCONTINUED | OUTPATIENT
Start: 2024-04-08 | End: 2024-04-11 | Stop reason: HOSPADM

## 2024-04-08 RX ORDER — INSULIN LISPRO 100 [IU]/ML
1-6 INJECTION, SOLUTION INTRAVENOUS; SUBCUTANEOUS EVERY 6 HOURS SCHEDULED
Status: DISCONTINUED | OUTPATIENT
Start: 2024-04-09 | End: 2024-04-10

## 2024-04-08 RX ORDER — METOPROLOL TARTRATE 50 MG/1
50 TABLET, FILM COATED ORAL EVERY 12 HOURS SCHEDULED
Status: DISCONTINUED | OUTPATIENT
Start: 2024-04-09 | End: 2024-04-10

## 2024-04-08 RX ORDER — GABAPENTIN 400 MG/1
400 CAPSULE ORAL 2 TIMES DAILY
Status: DISCONTINUED | OUTPATIENT
Start: 2024-04-09 | End: 2024-04-11 | Stop reason: HOSPADM

## 2024-04-08 RX ORDER — ISOSORBIDE MONONITRATE 60 MG/1
60 TABLET, EXTENDED RELEASE ORAL DAILY
Status: DISCONTINUED | OUTPATIENT
Start: 2024-04-09 | End: 2024-04-11 | Stop reason: HOSPADM

## 2024-04-08 RX ORDER — FERROUS SULFATE 325(65) MG
325 TABLET ORAL 2 TIMES DAILY WITH MEALS
Status: DISCONTINUED | OUTPATIENT
Start: 2024-04-09 | End: 2024-04-11 | Stop reason: HOSPADM

## 2024-04-08 RX ORDER — INSULIN GLARGINE 100 [IU]/ML
5 INJECTION, SOLUTION SUBCUTANEOUS
Status: DISCONTINUED | OUTPATIENT
Start: 2024-04-09 | End: 2024-04-11 | Stop reason: HOSPADM

## 2024-04-08 RX ORDER — RANOLAZINE 500 MG/1
500 TABLET, EXTENDED RELEASE ORAL 2 TIMES DAILY
Status: DISCONTINUED | OUTPATIENT
Start: 2024-04-09 | End: 2024-04-11 | Stop reason: HOSPADM

## 2024-04-08 RX ORDER — AMLODIPINE BESYLATE 5 MG/1
5 TABLET ORAL DAILY
Status: DISCONTINUED | OUTPATIENT
Start: 2024-04-09 | End: 2024-04-11 | Stop reason: HOSPADM

## 2024-04-08 RX ADMIN — SODIUM CHLORIDE 80 MG: 9 INJECTION, SOLUTION INTRAVENOUS at 21:06

## 2024-04-08 RX ADMIN — SODIUM CHLORIDE 8 MG/HR: 9 INJECTION, SOLUTION INTRAVENOUS at 21:21

## 2024-04-08 RX ADMIN — SODIUM CHLORIDE 250 ML: 0.9 INJECTION, SOLUTION INTRAVENOUS at 20:50

## 2024-04-09 PROBLEM — R06.02 SHORTNESS OF BREATH: Status: ACTIVE | Noted: 2024-04-09

## 2024-04-09 LAB
4HR DELTA HS TROPONIN: -2 NG/L
ABO GROUP BLD: NORMAL
ANION GAP SERPL CALCULATED.3IONS-SCNC: 5 MMOL/L (ref 4–13)
ATRIAL RATE: 51 BPM
ATRIAL RATE: 52 BPM
BASOPHILS # BLD AUTO: 0.04 THOUSANDS/ÂΜL (ref 0–0.1)
BASOPHILS NFR BLD AUTO: 1 % (ref 0–1)
BLD GP AB SCN SERPL QL: NEGATIVE
BUN SERPL-MCNC: 33 MG/DL (ref 5–25)
CALCIUM SERPL-MCNC: 8.3 MG/DL (ref 8.4–10.2)
CARDIAC TROPONIN I PNL SERPL HS: 27 NG/L
CHLORIDE SERPL-SCNC: 107 MMOL/L (ref 96–108)
CO2 SERPL-SCNC: 27 MMOL/L (ref 21–32)
CREAT SERPL-MCNC: 1.67 MG/DL (ref 0.6–1.3)
EOSINOPHIL # BLD AUTO: 0.19 THOUSAND/ÂΜL (ref 0–0.61)
EOSINOPHIL NFR BLD AUTO: 3 % (ref 0–6)
ERYTHROCYTE [DISTWIDTH] IN BLOOD BY AUTOMATED COUNT: 19.7 % (ref 11.6–15.1)
EST. AVERAGE GLUCOSE BLD GHB EST-MCNC: 160 MG/DL
FERRITIN SERPL-MCNC: 10 NG/ML (ref 24–336)
GFR SERPL CREATININE-BSD FRML MDRD: 37 ML/MIN/1.73SQ M
GLUCOSE SERPL-MCNC: 103 MG/DL (ref 65–140)
GLUCOSE SERPL-MCNC: 131 MG/DL (ref 65–140)
GLUCOSE SERPL-MCNC: 74 MG/DL (ref 65–140)
GLUCOSE SERPL-MCNC: 81 MG/DL (ref 65–140)
GLUCOSE SERPL-MCNC: 85 MG/DL (ref 65–140)
GLUCOSE SERPL-MCNC: 87 MG/DL (ref 65–140)
GLUCOSE SERPL-MCNC: 94 MG/DL (ref 65–140)
HBA1C MFR BLD: 7.2 %
HCT VFR BLD AUTO: 27 % (ref 36.5–49.3)
HCT VFR BLD AUTO: 29.3 % (ref 36.5–49.3)
HGB BLD-MCNC: 7.4 G/DL (ref 12–17)
HGB BLD-MCNC: 8.2 G/DL (ref 12–17)
HGB BLD-MCNC: 8.7 G/DL (ref 12–17)
IMM GRANULOCYTES # BLD AUTO: 0.01 THOUSAND/UL (ref 0–0.2)
IMM GRANULOCYTES NFR BLD AUTO: 0 % (ref 0–2)
IRON SATN MFR SERPL: 5 % (ref 15–50)
IRON SERPL-MCNC: 24 UG/DL (ref 50–212)
LYMPHOCYTES # BLD AUTO: 1.88 THOUSANDS/ÂΜL (ref 0.6–4.47)
LYMPHOCYTES NFR BLD AUTO: 31 % (ref 14–44)
MAGNESIUM SERPL-MCNC: 2.1 MG/DL (ref 1.9–2.7)
MCH RBC QN AUTO: 19.7 PG (ref 26.8–34.3)
MCHC RBC AUTO-ENTMCNC: 27.4 G/DL (ref 31.4–37.4)
MCV RBC AUTO: 72 FL (ref 82–98)
MONOCYTES # BLD AUTO: 0.48 THOUSAND/ÂΜL (ref 0.17–1.22)
MONOCYTES NFR BLD AUTO: 8 % (ref 4–12)
NEUTROPHILS # BLD AUTO: 3.5 THOUSANDS/ÂΜL (ref 1.85–7.62)
NEUTS SEG NFR BLD AUTO: 57 % (ref 43–75)
NRBC BLD AUTO-RTO: 0 /100 WBCS
P AXIS: 46 DEGREES
PLATELET # BLD AUTO: 244 THOUSANDS/UL (ref 149–390)
PMV BLD AUTO: 10.6 FL (ref 8.9–12.7)
POTASSIUM SERPL-SCNC: 3.6 MMOL/L (ref 3.5–5.3)
PR INTERVAL: 256 MS
QRS AXIS: -59 DEGREES
QRS AXIS: -66 DEGREES
QRSD INTERVAL: 122 MS
QRSD INTERVAL: 124 MS
QT INTERVAL: 496 MS
QT INTERVAL: 530 MS
QTC INTERVAL: 457 MS
QTC INTERVAL: 492 MS
RBC # BLD AUTO: 3.75 MILLION/UL (ref 3.88–5.62)
RH BLD: POSITIVE
SODIUM SERPL-SCNC: 139 MMOL/L (ref 135–147)
SPECIMEN EXPIRATION DATE: NORMAL
T WAVE AXIS: -20 DEGREES
T WAVE AXIS: -52 DEGREES
TIBC SERPL-MCNC: 454 UG/DL (ref 250–450)
UIBC SERPL-MCNC: 430 UG/DL (ref 155–355)
VENTRICULAR RATE: 51 BPM
VENTRICULAR RATE: 52 BPM
WBC # BLD AUTO: 6.1 THOUSAND/UL (ref 4.31–10.16)

## 2024-04-09 PROCEDURE — 99222 1ST HOSP IP/OBS MODERATE 55: CPT | Performed by: INTERNAL MEDICINE

## 2024-04-09 PROCEDURE — 85018 HEMOGLOBIN: CPT | Performed by: NURSE PRACTITIONER

## 2024-04-09 PROCEDURE — 99223 1ST HOSP IP/OBS HIGH 75: CPT | Performed by: INTERNAL MEDICINE

## 2024-04-09 PROCEDURE — 85018 HEMOGLOBIN: CPT | Performed by: INTERNAL MEDICINE

## 2024-04-09 PROCEDURE — 82948 REAGENT STRIP/BLOOD GLUCOSE: CPT

## 2024-04-09 PROCEDURE — 36415 COLL VENOUS BLD VENIPUNCTURE: CPT | Performed by: NURSE PRACTITIONER

## 2024-04-09 PROCEDURE — 99232 SBSQ HOSP IP/OBS MODERATE 35: CPT | Performed by: INTERNAL MEDICINE

## 2024-04-09 PROCEDURE — 83735 ASSAY OF MAGNESIUM: CPT | Performed by: NURSE PRACTITIONER

## 2024-04-09 PROCEDURE — 80048 BASIC METABOLIC PNL TOTAL CA: CPT | Performed by: NURSE PRACTITIONER

## 2024-04-09 PROCEDURE — 93010 ELECTROCARDIOGRAM REPORT: CPT | Performed by: INTERNAL MEDICINE

## 2024-04-09 PROCEDURE — 86850 RBC ANTIBODY SCREEN: CPT | Performed by: NURSE PRACTITIONER

## 2024-04-09 PROCEDURE — 85014 HEMATOCRIT: CPT | Performed by: NURSE PRACTITIONER

## 2024-04-09 PROCEDURE — 85025 COMPLETE CBC W/AUTO DIFF WBC: CPT | Performed by: NURSE PRACTITIONER

## 2024-04-09 PROCEDURE — 86901 BLOOD TYPING SEROLOGIC RH(D): CPT | Performed by: NURSE PRACTITIONER

## 2024-04-09 PROCEDURE — 86900 BLOOD TYPING SEROLOGIC ABO: CPT | Performed by: NURSE PRACTITIONER

## 2024-04-09 PROCEDURE — C9113 INJ PANTOPRAZOLE SODIUM, VIA: HCPCS

## 2024-04-09 PROCEDURE — 93005 ELECTROCARDIOGRAM TRACING: CPT

## 2024-04-09 RX ORDER — POTASSIUM CHLORIDE 20 MEQ/1
20 TABLET, EXTENDED RELEASE ORAL ONCE
Status: COMPLETED | OUTPATIENT
Start: 2024-04-09 | End: 2024-04-09

## 2024-04-09 RX ORDER — DEXTROSE AND SODIUM CHLORIDE 5; .9 G/100ML; G/100ML
50 INJECTION, SOLUTION INTRAVENOUS CONTINUOUS
Status: DISCONTINUED | OUTPATIENT
Start: 2024-04-09 | End: 2024-04-09

## 2024-04-09 RX ORDER — FUROSEMIDE 10 MG/ML
40 INJECTION INTRAMUSCULAR; INTRAVENOUS ONCE
Status: COMPLETED | OUTPATIENT
Start: 2024-04-09 | End: 2024-04-09

## 2024-04-09 RX ADMIN — RANOLAZINE 500 MG: 500 TABLET, FILM COATED, EXTENDED RELEASE ORAL at 17:24

## 2024-04-09 RX ADMIN — ISOSORBIDE MONONITRATE 60 MG: 60 TABLET, EXTENDED RELEASE ORAL at 09:33

## 2024-04-09 RX ADMIN — RANOLAZINE 500 MG: 500 TABLET, FILM COATED, EXTENDED RELEASE ORAL at 09:33

## 2024-04-09 RX ADMIN — GABAPENTIN 400 MG: 400 CAPSULE ORAL at 09:33

## 2024-04-09 RX ADMIN — DEXTROSE AND SODIUM CHLORIDE 50 ML/HR: 5; .9 INJECTION, SOLUTION INTRAVENOUS at 01:32

## 2024-04-09 RX ADMIN — CLONIDINE HYDROCHLORIDE 0.1 MG: 0.1 TABLET ORAL at 00:55

## 2024-04-09 RX ADMIN — CLONIDINE HYDROCHLORIDE 0.1 MG: 0.1 TABLET ORAL at 21:59

## 2024-04-09 RX ADMIN — AMLODIPINE BESYLATE 5 MG: 5 TABLET ORAL at 09:33

## 2024-04-09 RX ADMIN — ATORVASTATIN CALCIUM 40 MG: 40 TABLET, FILM COATED ORAL at 00:55

## 2024-04-09 RX ADMIN — FERROUS SULFATE TAB 325 MG (65 MG ELEMENTAL FE) 325 MG: 325 (65 FE) TAB at 17:24

## 2024-04-09 RX ADMIN — INSULIN GLARGINE 5 UNITS: 100 INJECTION, SOLUTION SUBCUTANEOUS at 00:56

## 2024-04-09 RX ADMIN — FENOFIBRATE 145 MG: 145 TABLET ORAL at 09:33

## 2024-04-09 RX ADMIN — CLONIDINE HYDROCHLORIDE 0.1 MG: 0.1 TABLET ORAL at 09:38

## 2024-04-09 RX ADMIN — ATORVASTATIN CALCIUM 40 MG: 40 TABLET, FILM COATED ORAL at 17:34

## 2024-04-09 RX ADMIN — SODIUM CHLORIDE 8 MG/HR: 9 INJECTION, SOLUTION INTRAVENOUS at 09:32

## 2024-04-09 RX ADMIN — POTASSIUM CHLORIDE 20 MEQ: 1500 TABLET, EXTENDED RELEASE ORAL at 05:37

## 2024-04-09 RX ADMIN — GABAPENTIN 400 MG: 400 CAPSULE ORAL at 17:24

## 2024-04-09 RX ADMIN — FERROUS SULFATE TAB 325 MG (65 MG ELEMENTAL FE) 325 MG: 325 (65 FE) TAB at 09:33

## 2024-04-09 RX ADMIN — FUROSEMIDE 40 MG: 10 INJECTION, SOLUTION INTRAMUSCULAR; INTRAVENOUS at 12:46

## 2024-04-09 RX ADMIN — SODIUM CHLORIDE 8 MG/HR: 9 INJECTION, SOLUTION INTRAVENOUS at 20:48

## 2024-04-09 NOTE — ASSESSMENT & PLAN NOTE
Lab Results   Component Value Date    HGBA1C 7.0 (H) 10/04/2023     Decrease lantus from 15 units HS to 5 units HS due to decreased intake  Accuchchris, SSI

## 2024-04-09 NOTE — ASSESSMENT & PLAN NOTE
Complaints of chest pain, shortness of breath with exertion ongoing since November.  No significant change.   Suspect symptoms multifactorial with known acute on chronic anemia, angina with underlying diffuse coronary artery disease

## 2024-04-09 NOTE — ASSESSMENT & PLAN NOTE
Presented for two days of dark stools, though is on iron as op.  Hgb stable 9.1.  BUN 37  Trend CBC  Hem occult positive in ED and was subsequently started on protonix drip  ASA, plavix on hold  NPO, GI

## 2024-04-09 NOTE — PROGRESS NOTES
[facility]  Progress Note  Name: Thomas Horne I  MRN: [id number]  Unit/Bed#: -01 I Date of Admission: 4/8/2024   Date of Service: 4/9/2024 I Hospital Day: 0    Assessment/Plan   Symptomatic anemia  Assessment & Plan  Presented for two days of dark stools, though is on iron as op.  Hgb stable 9.1 the drop down to 7.4, increased to 8.2 without any intervention.  Hem occult positive in ED and was subsequently started on protonix drip  ASA, plavix on hold  NPO  GI following.  Plans for EGD.    Stable angina pectoris  Assessment & Plan  Ongoing chest pain since November.  Chest pain improves when resting.   Troponin negative, EKG nonischemic   Maintained on ranexa, imdur.  Can consider increasing  Cardio following    Chronic kidney disease-mineral and bone disorder  Assessment & Plan  Baseline 1.6 to 1.8.  current creatinine 2 on admission.   Torsemide was held overnight.  Creatinine improved down to 1.6 today.  BMP in AM    Chronic HFpEF/Moderate pHTN (HFpEF) ([facility])  Assessment & Plan  Examines euvolemic   Last echocardiogram 6/2023: 50%, grade II diastolic dysfunction.  Patient complains of significant dyspnea on exertion which currently is most likely from anemia rather than heart failure.  Continue home medications.  Metoprolol BID  Torsemid 20 mg QD - on hold for now    Type 2 diabetes mellitus with diabetic polyneuropathy, with long-term current use of insulin ([facility])  Assessment & Plan  Lab Results   Component Value Date    HGBA1C 7.0 (H) 10/04/2023     Decrease lantus from 15 units HS to 5 units HS due to decreased intake  Accucheck, SSI    Hx of CABG  Assessment & Plan  ASA, plavix on hold  Continue statin, BB  Patient evaluated by cardiology.    Primary hypertension  Assessment & Plan  BP stable  Torsemide on hold for now because of increasing creatinine on admission.  Continue metoprolol, clonidine, amlodipine     * Shortness of breath  Assessment & Plan  Complaints of chest  pain, shortness of breath with exertion ongoing since November.  No significant change.   Suspect symptoms multifactorial with known acute on chronic anemia, angina with underlying diffuse coronary artery disease             VTE Pharmacologic Prophylaxis:   Pharmacologic: Pharmacologic VTE Prophylaxis contraindicated due to concern for GI bleed  Mechanical VTE Prophylaxis in Place: Yes    Patient Centered Rounds: I have performed bedside rounds with nursing staff today.    Discussions with Specialists or Other Care Team Provider: GI cardiology    Education and Discussions with Family / Patient: pt. he said he will call his wife     Time Spent for Care: 20 minutes.  More than 50% of total time spent on counseling and coordination of care as described above.    Current Length of Stay: 0 day(s)    Current Patient Status: Inpatient   Certification Statement: The patient will continue to require additional inpatient hospital stay due to above    Discharge Plan: 3-5 days    Code Status: Level 3 - DNAR and DNI      Subjective:   Pt seen and examined by me in morning.  Complain of ongoing chest discomfort and shortness of breath with exertion.  Not at rest.    Objective:     Vitals:   Temp (24hrs), Av.6 °F (36.4 °C), Min:97.5 °F (36.4 °C), Max:97.6 °F (36.4 °C)    Temp:  [97.5 °F (36.4 °C)-97.6 °F (36.4 °C)] 97.6 °F (36.4 °C)  HR:  [46-56] 50  Resp:  [14-26] 16  BP: (114-154)/(56-67) 117/59  SpO2:  [92 %-99 %] 98 %  Body mass index is 31.56 kg/m².     Input and Output Summary (last 24 hours):       Intake/Output Summary (Last 24 hours) at 2024 1422  Last data filed at 2024 2150  Gross per 24 hour   Intake 350 ml   Output --   Net 350 ml       Physical Exam:     Physical Exam    Constitutional: Pt appears comfortable. Not in any acute distress.  Cardiovascular: Normal rate, regular rhythm, normal heart sounds.  No murmur heard.  Pulmonary/Chest: Effort normal, air entry b/l equal. No respiratory distress. Pt has  no wheezes or crackles.   Abdominal: Soft. Non-distended, Non-tender. Bowel sounds are normal.   Musculoskeletal: Normal range of motion.   Neurological: awake, alert. Moving all extremities spontaneously.  Psychiatric: normal mood and affect.      Additional Data:     Labs:    Results from last 7 days   Lab Units 04/09/24  0347 04/09/24  0300   WBC Thousand/uL  --  6.10   HEMOGLOBIN g/dL 8.2* 7.4*   HEMATOCRIT % 29.3* 27.0*   PLATELETS Thousands/uL  --  244   SEGS PCT %  --  57   LYMPHO PCT %  --  31   MONO PCT %  --  8   EOS PCT %  --  3     Results from last 7 days   Lab Units 04/09/24  0300 04/08/24  1900   SODIUM mmol/L 139 138   POTASSIUM mmol/L 3.6 4.0   CHLORIDE mmol/L 107 103   CO2 mmol/L 27 29   BUN mg/dL 33* 37*   CREATININE mg/dL 1.67* 2.04*   ANION GAP mmol/L 5 6   CALCIUM mg/dL 8.3* 9.2   ALBUMIN g/dL  --  4.2   TOTAL BILIRUBIN mg/dL  --  0.34   ALK PHOS U/L  --  37   ALT U/L  --  12   AST U/L  --  16   GLUCOSE RANDOM mg/dL 87 137         Results from last 7 days   Lab Units 04/09/24  1144 04/09/24  0537 04/09/24  0246 04/09/24  0222 04/09/24  0043   POC GLUCOSE mg/dl 103 81 94 74 85     Results from last 7 days   Lab Units 04/08/24  2329   HEMOGLOBIN A1C % 7.2*               * I Have Reviewed All Lab Data Listed Above.  * Additional Pertinent Lab Tests Reviewed: All Labs For Current Hospital Admission Reviewed    Imaging:    Imaging Reports Reviewed Today Include:   Imaging Personally Reviewed by Myself Includes:      Recent Cultures (last 7 days):           Last 24 Hours Medication List:   Current Facility-Administered Medications   Medication Dose Route Frequency Provider Last Rate    acetaminophen  650 mg Oral Q6H PRN APOLLO Ordaz      amLODIPine  5 mg Oral Daily APOLLO Ordaz      atorvastatin  40 mg Oral QPM APOLLO Ordaz      cloNIDine  0.1 mg Oral Q12H [facility] APOLLO Ordaz      dextrose 5 % and sodium chloride 0.9 %  50 mL/hr Intravenous Continuous APOLLO Ordaz 50 mL/hr (04/09/24  0132)    fenofibrate  145 mg Oral Daily APOLLO Ordaz      ferrous sulfate  325 mg Oral BID With Meals APOLLO Ordaz      gabapentin  400 mg Oral BID APOLLO Ordaz      insulin glargine  5 Units Subcutaneous HS APOLLO Ordaz      insulin lispro  1-6 Units Subcutaneous Q6H [facility] APOLLO Ordaz      isosorbide mononitrate  60 mg Oral Daily APOLLO Ordaz      metoprolol tartrate  50 mg Oral Q12H [facility] APOLLO Ordaz      pantoprazole (PROTONIX) 80 mg in sodium chloride 0.9 % 100 mL infusion  8 mg/hr Intravenous Continuous Duran White MD 8 mg/hr (04/09/24 0932)    ranolazine  500 mg Oral BID APOLLO Ordaz          Today, Patient Was Seen By: Jonnathan Ramirez MD    ** Please Note: Dictation voice to text software may have been used in the creation of this document. **

## 2024-04-09 NOTE — ED PROVIDER NOTES
History  Chief Complaint   Patient presents with    Shortness of Breath     Patient states SOB, weakness starting a few days ago. Normal appetite. Denies n/v/d     79 y.o male with PMH of CHF, CAD s/p stenting, PAD s/p vascular stent in right leg, anemia diagnosed recently, who came in today with shortness of breath and feeling of weakness. All these he had since last December but for last 2 weeks it got worse. He feels short of breath and weak on walking few steps, also has chest pain on exertion. He is taking 2 iron tablets daily for anemia but still his Hb is within 9. Last admission his EGD shows duodenal ulcer, which was found to be the source of bleeding. This time his rectal exam shows bleeding. He complains of having dark color stool. we started him on protonix drip, and give him 250 ml bolus of fluid. In the ED his Hb 9.1, BUN 37, creatinine 2.04, troponin 29, EKG shows sinus bradycardia with 1st degree AV block. He is given 250 ml of fluid bolus with IV protonix drip. Reached out to [facility] for admission to have further evaluation. [facility] accepted the patient.      History provided by:  Patient   used: No    Shortness of Breath  Severity:  Moderate  Onset quality:  Gradual  Duration:  2 weeks  Timing:  Constant  Progression:  Worsening  Chronicity:  Chronic  Context: not smoke exposure and not URI    Relieved by:  Rest  Worsened by:  Exertion, activity and movement  Ineffective treatments:  Rest  Associated symptoms: chest pain    Associated symptoms: no abdominal pain, no claudication, no cough, no diaphoresis, no fever, no headaches, no rash, no sore throat, no syncope and no vomiting        Prior to Admission Medications   Prescriptions Last Dose Informant Patient Reported? Taking?   Blood Glucose Monitoring Suppl (OneTouch Verio Reflect) w/Device KIT  Self No No   Sig: Check blood sugars twice daily. Please substitute with appropriate alternative as covered by patient's insurance. Dx:  E11.65   Patient not taking: Reported on 2/1/2024   Droplet Pen Needles 32G X 4 MM MISC  Self No No   Sig: USE EVERY EVENING   Elastic Bandages & Supports (Medical Compression Stockings) MISC  Self No No   Sig: Use daily Knee High 15-20mmHg   Patient not taking: Reported on 2/1/2024   Empagliflozin 25 MG TABS  Self No No   Sig: Take 1 tablet (25 mg total) by mouth daily   Multiple Vitamins-Minerals (MULTIVITAMIN MEN 50+ PO)  Self Yes No   Sig: Take by mouth daily   Omega-3 Fatty Acids (fish oil) 1,000 mg  Self Yes No   Sig: Take 4,000 mg by mouth 2 (two) times a day   OneTouch Delica Lancets 33G MISC  Self No No   Sig: Check blood sugars twice daily. Please substitute with appropriate alternative as covered by patient's insurance. Dx: E11.65   Patient not taking: Reported on 2/1/2024   acetaminophen (TYLENOL) 325 mg tablet  Self No No   Sig: Take 2 tablets (650 mg total) by mouth every 6 (six) hours as needed for mild pain   amLODIPine (NORVASC) 10 mg tablet  Self No No   Sig: Take 0.5 tablets (5 mg total) by mouth daily   aspirin 81 mg chewable tablet  Self Yes No   Sig: Chew 81 mg daily   atorvastatin (LIPITOR) 40 mg tablet  Self No No   Sig: Take 1 tablet (40 mg total) by mouth daily   Patient taking differently: Take 40 mg by mouth every evening   cloNIDine (CATAPRES) 0.1 mg tablet  Self No No   Sig: take 1 tablet by mouth every 12 hours   clopidogrel (PLAVIX) 75 mg tablet  Self No No   Sig: TAKE 1 TABLET BY MOUTH DAILY   fenofibrate 160 MG tablet  Self No No   Sig: Take 1 tablet (160 mg total) by mouth daily   ferrous sulfate 324 (65 Fe) mg   No No   Sig: Take 1 tablet (324 mg total) by mouth 2 (two) times a day before meals   gabapentin (NEURONTIN) 600 MG tablet  Self No No   Sig: TAKE 1 TABLET BY MOUTH  TWICE DAILY   glimepiride (AMARYL) 2 mg tablet  Self No No   Sig: Take 1 tablet (2 mg total) by mouth 2 (two) times a day   glucose blood (OneTouch Verio) test strip  Self No No   Sig: TEST TWICE A DAY    insulin glargine (LANTUS) 100 units/mL subcutaneous injection  Self No No   Sig: Inject 15 Units under the skin daily at bedtime   isosorbide mononitrate (IMDUR) 60 mg 24 hr tablet  Self No No   Sig: Take 1 tablet (60 mg total) by mouth daily   metFORMIN (GLUCOPHAGE) 500 mg tablet  Self Yes No   Sig: Take 500 mg by mouth 2 (two) times a day with meals   metoprolol tartrate (LOPRESSOR) 50 mg tablet  Self No No   Sig: Take 1 tablet (50 mg total) by mouth every 12 (twelve) hours   nitroglycerin (NITROSTAT) 0.4 mg SL tablet  Self No No   Sig: Place 1 tablet (0.4 mg total) under the tongue every 5 (five) minutes as needed for chest pain   ondansetron (ZOFRAN) 4 mg tablet  Self Yes No   Sig: Take 4 mg by mouth every 8 (eight) hours as needed for nausea or vomiting   pantoprazole (PROTONIX) 40 mg tablet  Self No No   Sig: Take 1 tablet (40 mg total) by mouth 2 (two) times a day   polyethylene glycol (GOLYTELY) 4000 mL solution   No No   Sig: Take 4,000 mL by mouth once for 1 dose   Patient not taking: Reported on 2/22/2024   ranolazine (RANEXA) 500 mg 12 hr tablet   No No   Sig: take 1 tablet by mouth twice a day   sitaGLIPtin (JANUVIA) 100 mg tablet  Self No No   Sig: Take 1 tablet (100 mg total) by mouth daily   torsemide (DEMADEX) 20 mg tablet  Self No No   Sig: Take 1 tablet (20 mg total) by mouth daily      Facility-Administered Medications: None       Past Medical History:   Diagnosis Date    Anemia     CAD (coronary artery disease)     Cancer (HCC)     prostate    CHF (congestive heart failure) (HCC)     Chronic kidney disease     Diabetes mellitus (HCC)     Duodenal ulcer     Hyperlipidemia     Hypertension     Myocardial infarction (HCC)     Neuropathy     Bilateral feet    Sleep apnea     Could not tolerate CPAP       Past Surgical History:   Procedure Laterality Date    ADENOIDECTOMY      CARDIAC CATHETERIZATION Left 10/19/2022    Procedure: Cardiac Left Heart Cath;  Surgeon: Danielle Pereira MD;  Location: [facility];  Service: Cardiology    CARDIAC SURGERY  2002    3 cardiac bypass then angioplasty 2020    CHOLECYSTECTOMY      COLONOSCOPY  2024    CORONARY ARTERY BYPASS GRAFT      IR LOWER EXTREMITY ANGIOGRAM  2023    PA BYPASS W/VEIN FEMORAL-POPLITEAL Right 2023    Procedure: BYPASS FEMORAL-POPLITEAL WITH CRYO VEIN, RIGHT FEMORAL ENDARTERECTOMY;  Surgeon: Vasquez Clark MD;  Location: [facility];  Service: Vascular    PA SLCTV CATHJ 3RD+ ORD SLCTV ABDL PEL/LXTR BRNCH Right 2023    Procedure: ARTERIOGRAM Right lower extremity arteriogram with CO2 via right groin access;  Surgeon: Vasquez Clark MD;  Location: [facility];  Service: Vascular    PROSTATE SURGERY      TONSILLECTOMY         Family History   Problem Relation Age of Onset    Diabetes Mother     Alcohol abuse Father     Mental illness Neg Hx      I have reviewed and agree with the history as documented.    E-Cigarette/Vaping    E-Cigarette Use Never User      E-Cigarette/Vaping Substances    Nicotine No     THC No     CBD No     Flavoring No     Other No     Unknown No      Social History     Tobacco Use    Smoking status: Former     Current packs/day: 0.00     Average packs/day: 1.5 packs/day for 33.0 years (49.5 ttl pk-yrs)     Types: Cigarettes     Start date:      Quit date:      Years since quittin.2    Smokeless tobacco: Never   Vaping Use    Vaping status: Never Used   Substance Use Topics    Alcohol use: Yes     Alcohol/week: 14.0 standard drinks of alcohol     Types: 14 Cans of beer per week     Comment: 1 -2 daily    Drug use: Never        Review of Systems   Constitutional:  Positive for activity change, chills and fatigue. Negative for appetite change, diaphoresis, fever and unexpected weight change.   HENT:  Negative for congestion and sore throat.    Eyes:  Negative for visual disturbance.   Respiratory:  Positive for shortness of breath. Negative for cough, choking and  chest tightness.    Cardiovascular:  Positive for chest pain. Negative for palpitations, claudication, leg swelling and syncope.   Gastrointestinal:  Positive for blood in stool and constipation. Negative for abdominal distention, abdominal pain, nausea and vomiting.   Genitourinary:  Negative for decreased urine volume, difficulty urinating, enuresis, flank pain and urgency.   Skin:  Negative for color change, rash and wound.   Neurological:  Positive for weakness. Negative for dizziness, light-headedness and headaches.   Psychiatric/Behavioral:  Negative for behavioral problems and confusion.        Physical Exam  ED Triage Vitals   Temperature Pulse Respirations Blood Pressure SpO2   04/08/24 1857 04/08/24 1857 04/08/24 1857 04/08/24 1901 04/08/24 1857   97.5 °F (36.4 °C) 56 20 126/59 99 %      Temp Source Heart Rate Source Patient Position - Orthostatic VS BP Location FiO2 (%)   04/08/24 1857 04/08/24 1857 04/08/24 1930 04/08/24 1930 --   Oral Monitor Lying Right arm       Pain Score       --                    Orthostatic Vital Signs  Vitals:    04/08/24 1857 04/08/24 1901 04/08/24 1930   BP:  126/59 122/61   Pulse: 56  (!) 52   Patient Position - Orthostatic VS:   Lying       Physical Exam  Vitals and nursing note reviewed.   Constitutional:       General: He is not in acute distress.     Appearance: He is well-developed. He is not ill-appearing.   HENT:      Head: Normocephalic and atraumatic.   Eyes:      Extraocular Movements: Extraocular movements intact.      Pupils: Pupils are equal, round, and reactive to light.   Neck:      Vascular: No hepatojugular reflux or JVD.   Cardiovascular:      Rate and Rhythm: Bradycardia present. Rhythm irregular.      Pulses: Normal pulses.      Heart sounds: No murmur heard.  Pulmonary:      Effort: Pulmonary effort is normal.      Breath sounds: Normal breath sounds. No decreased breath sounds, wheezing, rhonchi or rales.   Chest:      Chest wall: No mass, tenderness or  edema.   Abdominal:      General: Bowel sounds are normal.      Palpations: Abdomen is soft. There is no mass.      Tenderness: There is no abdominal tenderness. There is no guarding.   Musculoskeletal:         General: Normal range of motion.      Cervical back: Normal range of motion.      Right lower leg: No tenderness. No edema.      Left lower leg: No tenderness. No edema.   Skin:     General: Skin is warm and dry.      Capillary Refill: Capillary refill takes 2 to 3 seconds.      Findings: No erythema or rash.   Neurological:      General: No focal deficit present.      Mental Status: He is alert and oriented to person, place, and time.      Motor: No weakness.   Psychiatric:         Mood and Affect: Mood normal.         Behavior: Behavior normal.         ED Medications  Medications   sodium chloride 0.9 % bolus 250 mL (250 mL Intravenous New Bag 4/8/24 2050)   pantoprazole (PROTONIX) 80 mg in sodium chloride 0.9 % 100 mL IVPB (has no administration in time range)   pantoprazole (PROTONIX) 80 mg in sodium chloride 0.9 % 100 mL infusion (has no administration in time range)       Diagnostic Studies  Results Reviewed       Procedure Component Value Units Date/Time    HS Troponin I 4hr [[id number]]     Lab Status: No result Specimen: Blood     HS Troponin 0hr (reflex protocol) [[id number]]  (Normal) Collected: 04/08/24 1900    Lab Status: Final result Specimen: Blood from Arm, Left Updated: 04/08/24 1929     hs TnI 0hr 29 ng/L     HS Troponin I 2hr [[id number]]     Lab Status: No result Specimen: Blood     Comprehensive metabolic panel [[id number]]  (Abnormal) Collected: 04/08/24 1900    Lab Status: Final result Specimen: Blood from Arm, Left Updated: 04/08/24 1925     Sodium 138 mmol/L      Potassium 4.0 mmol/L      Chloride 103 mmol/L      CO2 29 mmol/L      ANION GAP 6 mmol/L      BUN 37 mg/dL      Creatinine 2.04 mg/dL      Glucose 137 mg/dL      Calcium 9.2 mg/dL      AST 16 U/L      ALT 12 U/L      Alkaline  Phosphatase 37 U/L      Total Protein 7.8 g/dL      Albumin 4.2 g/dL      Total Bilirubin 0.34 mg/dL      eGFR 29 ml/min/1.73sq m     Narrative:      National Kid[employer] Foundation guidelines for Chronic Kidney Disease (CKD):     Stage 1 with normal or high GFR (GFR > 90 mL/min/1.73 square meters)    Stage 2 Mild CKD (GFR = 60-89 mL/min/1.73 square meters)    Stage 3A Moderate CKD (GFR = 45-59 mL/min/1.73 square meters)    Stage 3B Moderate CKD (GFR = 30-44 mL/min/1.73 square meters)    Stage 4 Severe CKD (GFR = 15-29 mL/min/1.73 square meters)    Stage 5 End Stage CKD (GFR <15 mL/min/1.73 square meters)  Note: GFR calculation is accurate only with a steady state creatinine    CBC and differential [[id number]]  (Abnormal) Collected: 04/08/24 1900    Lab Status: Final result Specimen: Blood from Arm, Left Updated: 04/08/24 1908     WBC 8.48 Thousand/uL      RBC 4.58 Million/uL      Hemoglobin 9.1 g/dL      Hematocrit 33.1 %      MCV 72 fL      MCH 19.9 pg      MCHC 27.5 g/dL      RDW 19.8 %      MPV 10.4 fL      Platelets 314 Thousands/uL      nRBC 0 /100 WBCs      Neutrophils Relative 70 %      Immature Grans % 0 %      Lymphocytes Relative 20 %      Monocytes Relative 7 %      Eosinophils Relative 3 %      Basophils Relative 0 %      Neutrophils Absolute 5.89 Thousands/µL      Absolute Immature Grans 0.03 Thousand/uL      Absolute Lymphocytes 1.73 Thousands/µL      Absolute Monocytes 0.59 Thousand/µL      Eosinophils Absolute 0.21 Thousand/µL      Basophils Absolute 0.03 Thousands/µL                    XR chest 1 view portable    (Results Pending)         Procedures  ECG 12 Lead Documentation Only    Date/Time: 4/8/2024 9:07 PM    Performed by: Duran White MD  Authorized by: Duran White MD    Indications / Diagnosis:  Shortness of breath  ECG reviewed by me, the ED Provider: yes    Patient location:  ED  Previous ECG:     Previous ECG:  Compared to current    Similarity:  No change  Interpretation:      Interpretation: abnormal    Rate:     ECG rate assessment: bradycardic    Rhythm:     Rhythm: sinus bradycardia and A-V block    Ectopy:     Ectopy: PVCs      PVCs:  Infrequent  QRS:     QRS axis:  Normal    QRS intervals:  Wide  Conduction:     Conduction: normal    ST segments:     ST segments:  Non-specific  T waves:     T waves: non-specific    Other findings:     Other findings: LVH          ED Course                             SBIRT 22yo+      Flowsheet Row Most Recent Value   Initial Alcohol Screen: US AUDIT-C     1. How often do you have a drink containing alcohol? 1 Filed at: 04/08/2024 1857   2. How many drinks containing alcohol do you have on a typical day you are drinking?  1 Filed at: 04/08/2024 1857   3a. Male UNDER 65: How often do you have five or more drinks on one occasion? 0 Filed at: 04/08/2024 1857   3b. FEMALE Any Age, or MALE 65+: How often do you have 4 or more drinks on one occassion? 0 Filed at: 04/08/2024 1857   Audit-C Score 2 Filed at: 04/08/2024 1857   [place]UG[place]: How many times in the past year have you...    Used an illegal drug or used a prescription medication for non-medical reasons? Never Filed at: 04/08/2024 1857                  Medical Decision Making  79 y.o. male presents with shortness of breath, weakness with having H/O anemia since November 2023, also has dark stool, found blood on digital rectal examination, looks dry, no leg edema, creatinine found to be elevated, troponin 29    D/D: symptomatic anemia, acute on chronic kidney disease, less likely MI, CHF exacerbation    Amount and/or Complexity of Data Reviewed  Labs: ordered.  Radiology: ordered.  ECG/medicine tests: ordered.    Risk  Decision regarding hospitalization.  Risk Details: Admitted under SLIM as observation          Disposition  Final diagnoses:   SOB (shortness of breath)   Anemia   Acute on chronic kidney failure  (HCC)   GI bleed     Time reflects when diagnosis was documented in both MDM as applicable and  the Disposition within this note       Time User Action Codes Description Comment    4/8/2024  8:47 PM Alex Whitemul Add [R06.02] SOB (shortness of breath)     4/8/2024  8:47 PM Cindy Ummul Add [D64.9] Anemia     4/8/2024  8:47 PM Cindy Ummul Add [N17.9,  N18.9] Acute on chronic kidney failure  (HCC)     4/8/2024  8:48 PM Cindy Ummul Add [K92.2] GI bleed           ED Disposition       ED Disposition   Admit    Condition   Stable    Date/Time   Mon Apr 8, 2024 2047    Comment   Case was discussed with Lara Main and the patient's admission status was agreed to be Admission Status: observation status to the service of Dr. Carmichael .               Follow-up Information    None         Patient's Medications   Discharge Prescriptions    No medications on file     No discharge procedures on file.    PDMP Review         Value Time User    PDMP Reviewed  Yes 2/17/2022  4:22 PM Maggie Garcia DO             ED Provider  Attending physically available and evaluated Thomas Horne. I managed the patient along with the ED Attending.    Electronically Signed by           Duran White MD  04/08/24 [id number]

## 2024-04-09 NOTE — ASSESSMENT & PLAN NOTE
Complaints of chest pain, shortness of breath ongoing since November.  No significant change.   Suspect symptoms multifactorial with known stable angina, anemia, deconditioning.    Additional plan below

## 2024-04-09 NOTE — ASSESSMENT & PLAN NOTE
Baseline 1.6 to 1.8.  current creatinine 2 on admission.   Torsemide was held overnight.  Creatinine improved down to 1.6 today.  BMP in AM

## 2024-04-09 NOTE — ED NOTES
Patient having many PVCs frequently preceding T wave - concern for R on T. Provider notified. Patient asx. Current rhythm Sinus bradycardia w/ AV block and frequent PVCs.      Meagan Anguiano RN  04/09/24 [id number]

## 2024-04-09 NOTE — ASSESSMENT & PLAN NOTE
Examines euvolemic   Last echocardiogram 6/2023: 50%, grade II diastolic dysfunction.  Patient complains of significant dyspnea on exertion which currently is most likely from anemia rather than heart failure.  Continue home medications.  Metoprolol BID  Torsemid 20 mg QD - on hold for now

## 2024-04-09 NOTE — CONSULTS
Consultation - Cardiology Team One  Thomas Horne 79 y.o. male MRN: [id number]  Unit/Bed#: -01 Encounter: [id number]    Inpatient consult to Cardiology  Consult performed by: Reena Esquivel MD  Consult ordered by: APOLLO Ordaz          Physician Requesting Consult: Jonnathan Ramirez MD  Reason for Consult / Principal Problem: Chest Pain       Assessment/ Plan  Chest Pain  Exertional CP and OQUENDO   Progressively worsened since stool changes, unable to walk same distances as prior   Has chronic stable angina hx   Initial presentation Afebrile, HR 56, 126/59, 99% RA   CBC- Hgb 9.1- baseline 8-9  CMP- BUN/Cr 37/2.04, baseline 1.6-1.8  Troponin 29, 19, 27    EKG- Sinus Bradycardia, 1st Degree AV Block  Chest Xray- Bilateral pulmonary edema with pleural effusions R>L      Plan-   Would recommend continuing home regimen Ranexa  Temporarily holding Metoprolol       Black/Tarry Stools  Hx of JESUS on Fe supplementation, and Internal Hemorrhoids  Previous EGD 2023- Multiple small clean based shallow superficial ulcers in the bulb  Home regimen includes ASA and Plavix which have since been on hold   Hgb baseline 9-10  Hgb dropped 7.4, now 8.2    Plan-   GI Following   Plans for EGD  Last Plavix dose 4/8    Acute on Chronic HFpEF Exacerbation   Trigger likely related to JESUS and GI Bleed   ECHO 2023 - EF 50%, mild global hypokinesis, grade 2 diastolic dysfunction, mild to moderate AS  Home regimen includes Jardiance, Lopressor, Torsemide 20mg  Weight is above baseline currently 245, baseline 231    Cxray- Bilateral pulmonary edema with pleural effusions R>L    Plan-   Will give   Monitor Daily Weights   Monitor I/Os  Monitor BMP   Lasix 40mg IV once today and reassess response         IVY on CKD III  Baseline appears to be around 1.6-1.8  Cr on admission 2.09, has since improved to baseline 1.67    Plan-   Monitor in the setting of diuresis  Moniotr BMP       Coronary Artery Disease   Hx of CABGx3 in  2003  Stent in L Circumflex  % occluded    Plan-   ASA and Plavix on hold in setting of GI bleed   Metoprolol also on hold in setting of bradycardia         PAD   VAS Arterial Duplex 1/24- >75% stenosis noted in proximal anastomosis of the CFA - below knee popliteal artery bypass graft, FABIOLA 0.57,  >75% stenosis in the right proximal bpg anastomosis is a new finding s/p  intervention    L Calf Pain  Endorses more swelling than usual since graft in November 2023     Plan-   Continue to monitor for now in setting of CHF Exacerbation   Would check VAS US of LLE if worsens                 History of Present Illness   HPI: Thomas Horne is a 79 y.o. year old male who has a history of HFpEF, CAD s/p CABGx3, PAD/PVD, T2DM, HTN, HLD, PHTN, Chronic stable angina, follows with cardiologist Dr. Pereira       He presents with Exertional CP and OQUENDO as well as noticing black/tarry stools. Patient endorses that his OQUENDO has progressively worsened and is unable to walk short distances that he was able to walk prior. Patient denies at rest CP, SOB, palpitations, near syncope, abdominal pain, NVD, BRBPR, hematemesis, hemoptysis, or any other sx at this time.      EKG reviewed personally: Sinus Viridiana with PVC and 1st Degree AV Block     Telemetry reviewed personally: Sinus viridiana 58    Review of Systems   Constitutional: Negative for chills, fever, malaise/fatigue and weight loss.   HENT:  Negative for congestion.    Eyes:  Negative for blurred vision.   Cardiovascular:  Positive for chest pain. Negative for dyspnea on exertion, irregular heartbeat, near-syncope, palpitations and syncope.        CP and SOB with exertion    Respiratory:  Negative for cough, shortness of breath and wheezing.    Musculoskeletal:  Negative for neck pain.   Gastrointestinal:  Positive for melena. Negative for bloating, abdominal pain, constipation, diarrhea, hematemesis, hematochezia, nausea and vomiting.        Black tarry stools    Genitourinary:   Negative for bladder incontinence, dysuria, frequency and urgency.   Neurological:  Negative for dizziness, headaches, light-headedness, numbness, seizures and weakness.   Psychiatric/Behavioral:  Negative for altered mental status.      Historical Information   Past Medical History:   Diagnosis Date    Anemia     CAD (coronary artery disease)     Cancer (HCC)     prostate    CHF (congestive heart failure) (HCC)     Chronic kidney disease     Diabetes mellitus (HCC)     Duodenal ulcer     Hyperlipidemia     Hypertension     Myocardial infarction (HCC)     Neuropathy     Bilateral feet    Sleep apnea     Could not tolerate CPAP     Past Surgical History:   Procedure Laterality Date    ADENOIDECTOMY      CARDIAC CATHETERIZATION Left 10/19/2022    Procedure: Cardiac Left Heart Cath;  Surgeon: Danielle Pereira MD;  Location: [facility];  Service: Cardiology    CARDIAC SURGERY  2002    3 cardiac bypass then angioplasty 7/2020    CHOLECYSTECTOMY      COLONOSCOPY  02/14/2024    CORONARY ARTERY BYPASS GRAFT      IR LOWER EXTREMITY ANGIOGRAM  11/01/2023    HI BYPASS W/VEIN FEMORAL-POPLITEAL Right 11/16/2023    Procedure: BYPASS FEMORAL-POPLITEAL WITH CRYO VEIN, RIGHT FEMORAL ENDARTERECTOMY;  Surgeon: Vasquez Clark MD;  Location: [facility];  Service: Vascular    HI SLCTV CATHJ 3RD+ ORD SLCTV ABDL PEL/LXTR BRNCH Right 11/01/2023    Procedure: ARTERIOGRAM Right lower extremity arteriogram with CO2 via right groin access;  Surgeon: Vasquez Clark MD;  Location: [facility];  Service: Vascular    PROSTATE SURGERY      TONSILLECTOMY       Social History     Substance and Sexual Activity   Alcohol Use Yes    Alcohol/week: 14.0 standard drinks of alcohol    Types: 14 Cans of beer per week    Comment: 1 -2 daily     Social History     Substance and Sexual Activity   Drug Use Never     Social History     Tobacco Use   Smoking Status Former    Current packs/day: 0.00    Average packs/day: 1.5  packs/day for 33.0 years (49.5 ttl pk-yrs)    Types: Cigarettes    Start date:     Quit date:     Years since quittin.2   Smokeless Tobacco Never     Family History:   Family History   Problem Relation Age of Onset    Diabetes Mother     Alcohol abuse Father     Mental illness Neg Hx        Meds/Allergies   all current active meds have been reviewed  dextrose 5 % and sodium chloride 0.9 %, 50 mL/hr, Last Rate: 50 mL/hr (24 0132)  pantoprazole (PROTONIX) 80 mg in sodium chloride 0.9 % 100 mL infusion, 8 mg/hr, Last Rate: 8 mg/hr (24)        Allergies   Allergen Reactions    Lisinopril Rash and Lip Swelling       Objective   Vitals: Blood pressure 122/60, pulse (!) 48, temperature 97.6 °F (36.4 °C), temperature source Oral, resp. rate 16, weight 112 kg (245 lb 13 oz), SpO2 98%., Body mass index is 31.56 kg/m².,     Systolic (24hrs), Av , Min:114 , Max:154     Diastolic (24hrs), Av, Min:56, Max:67        Intake/Output Summary (Last 24 hours) at 2024 0852  Last data filed at 2024 2150  Gross per 24 hour   Intake 350 ml   Output --   Net 350 ml     Wt Readings from Last 3 Encounters:   24 112 kg (245 lb 13 oz)   24 105 kg (231 lb)   24 105 kg (231 lb)     Invasive Devices       Peripheral Intravenous Line  Duration             Peripheral IV 24 Dorsal (posterior);Right Forearm <1 day                    Physical Exam  Constitutional:       General: He is not in acute distress.     Appearance: Normal appearance. He is obese. He is not ill-appearing, toxic-appearing or diaphoretic.   HENT:      Head: Normocephalic and atraumatic.      Nose: Nose normal.      Mouth/Throat:      Mouth: Mucous membranes are moist.      Pharynx: No oropharyngeal exudate or posterior oropharyngeal erythema.   Eyes:      Extraocular Movements: Extraocular movements intact.      Conjunctiva/sclera: Conjunctivae normal.      Pupils: Pupils are equal, round, and reactive to light.    Cardiovascular:      Rate and Rhythm: Normal rate and regular rhythm.      Pulses: Normal pulses.      Heart sounds: Murmur heard.      No friction rub. No gallop.   Pulmonary:      Effort: Pulmonary effort is normal. No respiratory distress.      Breath sounds: Normal breath sounds. No stridor. No wheezing or rhonchi.      Comments: Crackles at the bases bilaterally   Chest:      Chest wall: No tenderness.   Abdominal:      General: Bowel sounds are normal. There is no distension.      Palpations: Abdomen is soft.      Tenderness: There is no abdominal tenderness. There is no guarding or rebound.   Musculoskeletal:         General: Normal range of motion.      Cervical back: Normal range of motion.      Comments: Very trace edema bilaterally    Skin:     General: Skin is warm.   Neurological:      General: No focal deficit present.      Mental Status: He is alert and oriented to person, place, and time. Mental status is at baseline.      Cranial Nerves: No cranial nerve deficit.      Sensory: No sensory deficit.      Motor: No weakness.      Coordination: Coordination normal.      Gait: Gait normal.   Psychiatric:         Mood and Affect: Mood normal.         Behavior: Behavior normal.         Thought Content: Thought content normal.         LABORATORY RESULTS:      CBC with diff:   Results from last 7 days   Lab Units 04/09/24  0347 04/09/24  0300 04/08/24  1900 04/02/24  1036   WBC Thousand/uL  --  6.10 8.48 6.98   HEMOGLOBIN g/dL 8.2* 7.4* 9.1* 9.4*   HEMATOCRIT % 29.3* 27.0* 33.1* 34.3*   MCV fL  --  72* 72* 72*   PLATELETS Thousands/uL  --  244 314 402*   RBC Million/uL  --  3.75* 4.58 4.74   MCH pg  --  19.7* 19.9* 19.8*   MCHC g/dL  --  27.4* 27.5* 27.4*   RDW %  --  19.7* 19.8* 19.1*   MPV fL  --  10.6 10.4 10.9   NRBC AUTO /100 WBCs  --  0 0 0       CMP:  Results from last 7 days   Lab Units 04/09/24  0300 04/08/24  1900   POTASSIUM mmol/L 3.6 4.0   CHLORIDE mmol/L 107 103   CO2 mmol/L 27 29   BUN mg/dL  "33* 37*   CREATININE mg/dL 1.67* 2.04*   CALCIUM mg/dL 8.3* 9.2   AST U/L  --  16   ALT U/L  --  12   ALK PHOS U/L  --  37   EGFR ml/min/1.73sq m 37 29       BMP:  Results from last 7 days   Lab Units 04/09/24  0300 04/08/24  1900   POTASSIUM mmol/L 3.6 4.0   CHLORIDE mmol/L 107 103   CO2 mmol/L 27 29   BUN mg/dL 33* 37*   CREATININE mg/dL 1.67* 2.04*   CALCIUM mg/dL 8.3* 9.2       Lab Results   Component Value Date    CREATININE 1.67 (H) 04/09/2024    CREATININE 2.04 (H) 04/08/2024    CREATININE 1.54 (H) 01/12/2024       Lab Results   Component Value Date    NTBNP 420 02/16/2022          Results from last 7 days   Lab Units 04/09/24  0300   MAGNESIUM mg/dL 2.1          Results from last 7 days   Lab Units 04/08/24  2329   HEMOGLOBIN A1C % 7.2*                  Lipid Profile:   No results found for: \"CHOL\"  Lab Results   Component Value Date    HDL 49 10/04/2023    HDL 39 (L) 03/09/2023    HDL 46 07/19/2022     Lab Results   Component Value Date    LDLCALC 51 10/04/2023    LDLCALC 56 03/09/2023    LDLCALC 62 07/19/2022     Lab Results   Component Value Date    TRIG 93 10/04/2023    TRIG 93 03/09/2023    TRIG 115 07/19/2022       Cardiac testing:   No results found for this or any previous visit.    No results found for this or any previous visit.    No valid procedures specified.  No results found for this or any previous visit.      Imaging: I have personally reviewed pertinent reports.    No results found.    Assessment  Principal Problem:    Shortness of breath  Active Problems:    Primary hypertension    Hx of CABG    Type 2 diabetes mellitus with diabetic polyneuropathy, with long-term current use of insulin (HCC)    Chronic HFpEF/Moderate pHTN (HFpEF) (HCC)    Chronic kidney disease-mineral and bone disorder    Stable angina pectoris    Symptomatic anemia      Thank you for allowing us to participate in this patient's care. This pt will follow up with Dr. Pereira once discharged.     Counseling / Coordination of " Care  Total floor / unit time spent today 45 minutes.  Greater than 50% of total time was spent with the patient and / or family counseling and / or coordination of care.  A description of the counseling / coordination of care: Review of history, current assessment, development of a plan.    Code Status: Level 3 - DNAR and DNI    ** Please Note: Dragon 360 Dictation voice to text software may have been used in the creation of this document. **

## 2024-04-09 NOTE — ED ATTENDING ATTESTATION
4/8/2024  IZaida MD, saw and evaluated the patient. I have discussed the patient with the resident/non-physician practitioner and agree with the resident's/non-physician practitioner's findings, Plan of Care, and MDM as documented in the resident's/non-physician practitioner's note, except where noted. All available labs and Radiology studies were reviewed.  I was present for key portions of any procedure(s) performed by the resident/non-physician practitioner and I was immediately available to provide assistance.       At this point I agree with the current assessment done in the Emergency Department.  I have conducted an independent evaluation of this patient a history and physical is as follows:    79-year-old male presenting to emergency room shortness of breath.  Had some chest pain which has resolved.  Continues with dark stools.  History of anemia.  Does take iron.  No fevers.  No abdominal pain.  No vomiting.  No diarrhea.    Agree with cardiac evaluation, Protonix for likely upper GI bleed, fecal occult positive.  Will need admission to hospital.        ED Course         Critical Care Time  Procedures

## 2024-04-09 NOTE — H&P
[facility]  H&P  Name: Thomas Horne 79 y.o. male I MRN: [id number]  Unit/Bed#: ED-08 I Date of Admission: 4/8/2024   Date of Service: 4/9/2024 I Hospital Day: 0      Assessment/Plan   * Shortness of breath  Assessment & Plan  Complaints of chest pain, shortness of breath ongoing since November.  No significant change.   Suspect symptoms multifactorial with known stable angina, anemia, deconditioning.    Additional plan below     Symptomatic anemia  Assessment & Plan  Presented for two days of dark stools, though is on iron as op.  Hgb stable 9.1.  BUN 37  Trend CBC  Hem occult positive in ED and was subsequently started on protonix drip  ASA, plavix on hold  NPO, GI     Stable angina pectoris  Assessment & Plan  Ongoing chest pain since November.  Chest pain improves when resting.   Troponin negative, EKG nonischemic   Maintained on ranexa, imdur.  Can consider increasing  Consider card consult    Chronic HFpEF/Moderate pHTN (HFpEF) ([facility])  Assessment & Plan  Examines euvolemic   + 3 lbs,  (prior 453)  Last echocardiogram 6/2023: 50%, grade II diastolic dysfunction.  Monitor intake and output, daily weights.  Low sodium diet and fluid restriction.  Continue home medications.  Metoprolol BID  Torsemid 20 mg QD - on hold for now, await GI eval    Chronic kidney disease-mineral and bone disorder  Assessment & Plan  Baseline 1.6 to 1.8.  current creatinine 2 on admission.   BMP in AM    Type 2 diabetes mellitus with diabetic polyneuropathy, with long-term current use of insulin ([facility])  Assessment & Plan  Lab Results   Component Value Date    HGBA1C 7.0 (H) 10/04/2023     Resume PO meds on dc  Decrease lantus from 15 units HS to 5 units HS due to decreased intake  Accucheck, SSI    Hx of CABG  Assessment & Plan  ASA, plavix on hold  Continue statin, BB    Primary hypertension  Assessment & Plan  BP stable  Torsemide on hold for now   Continue metoprolol, clonidine, amlodipine          VTE  Pharmacologic Prophylaxis: VTE Score: 4 Moderate Risk (Score 3-4) - Pharmacological DVT Prophylaxis Contraindicated. Sequential Compression Devices Ordered.  Code Status: Level 3 - DNAR and DNI   Discussion with family: Patient declined call to .     Anticipated Length of Stay: Patient will be admitted on an observation basis with an anticipated length of stay of less than 2 midnights secondary to shortness of breath, + heme occult.    Total Time Spent on Date of Encounter in care of patient:  mins. This time was spent on one or more of the following: performing physical exam; counseling and coordination of care; obtaining or reviewing history; documenting in the medical record; reviewing/ordering tests, medications or procedures; communicating with other healthcare professionals and discussing with patient's family/caregivers.    Chief Complaint: dark stool    History of Present Illness:  Thomas Horne is a 79 y.o. male with a PMH of PUD, CAD s/p CABG, CKD, DM 2, AAA s/p EVAR, CHF, HTN, prostate CA sp prostatectomy who presents with dark stools x 2 days.  Patient is on iron BID but though he should be evaluated.  Hgb and BUN stable.   Additionally, he complains of shortness of breath and chest pain ongoing since November.  Symptoms improve with rest.  Troponins negative.      Review of Systems:  Review of Systems   Constitutional:  Positive for appetite change. Negative for chills and fever.   Respiratory:  Positive for shortness of breath. Negative for cough and wheezing.    Cardiovascular:  Positive for chest pain and leg swelling (R chronic).   Gastrointestinal:  Negative for abdominal distention, abdominal pain, anal bleeding, blood in stool, constipation, diarrhea, nausea, rectal pain and vomiting.        Dark stool   All other systems reviewed and are negative.      Past Medical and Surgical History:   Past Medical History:   Diagnosis Date    Anemia     CAD (coronary artery disease)     Cancer  (HCC)     prostate    CHF (congestive heart failure) (HCC)     Chronic kidney disease     Diabetes mellitus (HCC)     Duodenal ulcer     Hyperlipidemia     Hypertension     Myocardial infarction (HCC)     Neuropathy     Bilateral feet    Sleep apnea     Could not tolerate CPAP       Past Surgical History:   Procedure Laterality Date    ADENOIDECTOMY      CARDIAC CATHETERIZATION Left 10/19/2022    Procedure: Cardiac Left Heart Cath;  Surgeon: Danielle Pereira MD;  [facility];  Service: Cardiology    CARDIAC SURGERY  2002    3 cardiac bypass then angioplasty 7/2020    CHOLECYSTECTOMY      COLONOSCOPY  02/14/2024    CORONARY ARTERY BYPASS GRAFT      IR LOWER EXTREMITY ANGIOGRAM  11/01/2023    [place] BYPASS W/VEIN FEMORAL-POPLITEAL Right 11/16/2023    Procedure: BYPASS FEMORAL-POPLITEAL WITH CRYO VEIN, RIGHT FEMORAL ENDARTERECTOMY;  Surgeon: Vasquez Clark MD;  Location: [facility];  Service: Vascular    [place] SLCTV CATHJ 3RD+ ORD SLCTV ABDL PEL/LXTR BRNCH Right 11/01/2023    Procedure: ARTERIOGRAM Right lower extremity arteriogram with CO2 via right groin access;  Surgeon: Vasquez Clark MD;  Location: [facility];  Service: Vascular    PROSTATE SURGERY      TONSILLECTOMY         Meds/Allergies:  Prior to Admission medications    Medication Sig Start Date End Date Taking? Authorizing Provider   acetaminophen (TYLENOL) 325 mg tablet Take 2 tablets (650 mg total) by mouth every 6 (six) hours as needed for mild pain 11/21/23   APOLLO Kwong   amLODIPine (NORVASC) 10 mg tablet Take 0.5 tablets (5 mg total) by mouth daily 7/26/23   Jose Fischer MD   aspirin 81 mg chewable tablet Chew 81 mg daily    Historical Provider, MD   atorvastatin (LIPITOR) 40 mg tablet Take 1 tablet (40 mg total) by mouth daily  Patient taking differently: Take 40 mg by mouth every evening 7/11/23   Danielle Pereira MD   Blood Glucose Monitoring Suppl (OneTouch Verio Reflect) w/Device KIT Check blood  sugars twice daily. Please substitute with appropriate alternative as covered by patient's insurance. Dx: E11.65  Patient not taking: Reported on 2/1/2024 2/22/22   Frank Lombardi, DO   cloNIDine (CATAPRES) 0.1 mg tablet take 1 tablet by mouth every 12 hours 11/28/23   Frank Lombardi, DO   clopidogrel (PLAVIX) 75 mg tablet TAKE 1 TABLET BY MOUTH DAILY 2/1/24   Danielle Pereira MD   Droplet Pen Needles 32G X 4 MM MISC USE EVERY EVENING 9/27/23   Frank Lombardi, DO   Elastic Bandages & Supports (Medical Compression Stockings) MISC Use daily Knee High 15-20mmHg  Patient not taking: Reported on 2/1/2024 11/30/23   Vasquez Clark MD   Empagliflozin 25 MG TABS Take 1 tablet (25 mg total) by mouth daily 12/8/23 6/5/24  Frank Lombardi, DO   fenofibrate 160 MG tablet Take 1 tablet (160 mg total) by mouth daily 7/11/23   Danielle Pereira MD   ferrous sulfate 324 (65 Fe) mg Take 1 tablet (324 mg total) by mouth 2 (two) times a day before meals 3/18/24 6/16/24  Frank Lombardi, DO   gabapentin (NEURONTIN) 600 MG tablet TAKE 1 TABLET BY MOUTH  TWICE DAILY 2/10/23   Sundar Arango DPM   glimepiride (AMARYL) 2 mg tablet Take 1 tablet (2 mg total) by mouth 2 (two) times a day 1/17/24 1/11/25  Frank Lombardi, DO   glucose blood (OneTouch Verio) test strip TEST TWICE A DAY 12/8/23   Frank Lombardi, DO   insulin glargine (LANTUS) 100 units/mL subcutaneous injection Inject 15 Units under the skin daily at bedtime 11/21/23   APOLLO Kwong   isosorbide mononitrate (IMDUR) 60 mg 24 hr tablet Take 1 tablet (60 mg total) by mouth daily 7/11/23   Danielle Pereira MD   metFORMIN (GLUCOPHAGE) 500 mg tablet Take 500 mg by mouth 2 (two) times a day with meals    Historical Provider, MD   metoprolol tartrate (LOPRESSOR) 50 mg tablet Take 1 tablet (50 mg total) by mouth every 12 (twelve) hours 7/26/23   Jose Fischer MD   Multiple Vitamins-Minerals (MULTIVITAMIN MEN 50+ PO) Take by mouth daily    Historical Provider, MD    nitroglycerin (NITROSTAT) 0.4 mg SL tablet Place 1 tablet (0.4 mg total) under the tongue every 5 (five) minutes as needed for chest pain 3/7/23   APOLLO Novoa   Omega-3 Fatty Acids (fish oil) 1,000 mg Take 4,000 mg by mouth 2 (two) times a day    Historical Provider, MD   ondansetron (ZOFRAN) 4 mg tablet Take 4 mg by mouth every 8 (eight) hours as needed for nausea or vomiting    Historical Provider, MD   OneTouch Delica Lancets 33G MISC Check blood sugars twice daily. Please substitute with appropriate alternative as covered by patient's insurance. Dx: E11.65  Patient not taking: Reported on 2/1/2024 2/22/22   Frank Lombardi, DO   pantoprazole (PROTONIX) 40 mg tablet Take 1 tablet (40 mg total) by mouth 2 (two) times a day 12/27/23 3/26/24  Lowell Giron DO   polyethylene glycol (GOLYTELY) 4000 mL solution Take 4,000 mL by mouth once for 1 dose  Patient not taking: Reported on 2/22/2024 2/1/24 2/14/24  Tj Paulino PA-C   ranolazine (RANEXA) 500 mg 12 hr tablet take 1 tablet by mouth twice a day 2/26/24   Danielle Pereira MD   sitaGLIPtin (JANUVIA) 100 mg tablet Take 1 tablet (100 mg total) by mouth daily 12/8/23   Frank Lombardi, DO   torsemide (DEMADEX) 20 mg tablet Take 1 tablet (20 mg total) by mouth daily 7/11/23   Danielle Pereira MD     I have reviewed home medications with patient personally.    Allergies:   Allergies   Allergen Reactions    Lisinopril Rash and Lip Swelling       Social History:  Marital Status:    Occupation:   Patient Pre-hospital Living Situation: Home  Patient Pre-hospital Level of Mobility: walks  Patient Pre-hospital Diet Restrictions:   Substance Use History:   Social History     Substance and Sexual Activity   Alcohol Use Yes    Alcohol/week: 14.0 standard drinks of alcohol    Types: 14 Cans of beer per week    Comment: 1 -2 daily     Social History     Tobacco Use   Smoking Status Former    Current packs/day: 0.00    Average packs/day: 1.5 packs/day  for 33.0 years (49.5 ttl pk-yrs)    Types: Cigarettes    Start date:     Quit date:     Years since quittin.2   Smokeless Tobacco Never     Social History     Substance and Sexual Activity   Drug Use Never       Family History:  Family History   Problem Relation Age of Onset    Diabetes Mother     Alcohol abuse Father     Mental illness Neg Hx        Physical Exam:     Vitals:   Blood Pressure: 137/63 (24 0100)  Pulse: (!) 48 (24)  Temperature: 97.5 °F (36.4 °C) (24)  Temp Source: Oral (24)  Respirations: 20 (24)  SpO2: 92 % (24)    Physical Exam  Constitutional:       General: He is not in acute distress.     Appearance: Normal appearance. He is not ill-appearing.   HENT:      Head: Normocephalic and atraumatic.      Right Ear: External ear normal.      Left Ear: External ear normal.      Nose: Nose normal.      Mouth/Throat:      Pharynx: Oropharynx is clear.   Eyes:      Extraocular Movements: Extraocular movements intact.      Conjunctiva/sclera: Conjunctivae normal.   Cardiovascular:      Rate and Rhythm: Regular rhythm. Bradycardia present.      Pulses: Normal pulses.      Heart sounds: Normal heart sounds.   Pulmonary:      Effort: Pulmonary effort is normal. No respiratory distress.      Breath sounds: Normal breath sounds. No wheezing, rhonchi or rales.   Chest:      Chest wall: No tenderness.   Abdominal:      General: Bowel sounds are normal. There is no distension.      Palpations: Abdomen is soft.      Tenderness: There is no abdominal tenderness. There is no right CVA tenderness, left CVA tenderness, guarding or rebound.   Musculoskeletal:         General: Normal range of motion.      Cervical back: Normal range of motion.   Skin:     General: Skin is warm.      Capillary Refill: Capillary refill takes less than 2 seconds.   Neurological:      General: No focal deficit present.      Mental Status: He is alert and oriented to  person, place, and time.   Psychiatric:         Mood and Affect: Mood normal.         Behavior: Behavior normal.          Additional Data:     Lab Results:  Results from last 7 days   Lab Units 04/08/24  1900   WBC Thousand/uL 8.48   HEMOGLOBIN g/dL 9.1*   HEMATOCRIT % 33.1*   PLATELETS Thousands/uL 314   NEUTROS PCT % 70   LYMPHS PCT % 20   MONOS PCT % 7   EOS PCT % 3     Results from last 7 days   Lab Units 04/08/24  1900   SODIUM mmol/L 138   POTASSIUM mmol/L 4.0   CHLORIDE mmol/L 103   CO2 mmol/L 29   BUN mg/dL 37*   CREATININE mg/dL 2.04*   ANION GAP mmol/L 6   CALCIUM mg/dL 9.2   ALBUMIN g/dL 4.2   TOTAL BILIRUBIN mg/dL 0.34   ALK PHOS U/L 37   ALT U/L 12   AST U/L 16   GLUCOSE RANDOM mg/dL 137         Results from last 7 days   Lab Units 04/09/24  0043   POC GLUCOSE mg/dl 85               Lines/Drains:  Invasive Devices       Peripheral Intravenous Line  Duration             Peripheral IV 04/08/24 Left;Ventral (anterior) Forearm <1 day                        Imaging: Reviewed radiology reports from this admission including: chest xray  XR chest 1 view portable    (Results Pending)       EKG and Other Studies Reviewed on Admission:   EKG: Sinus Bradycardia. HR 1st AV block. HR 51.    ** Please Note: This note has been constructed using a voice recognition system. **

## 2024-04-09 NOTE — CONSULTS
Consultation -  [facility]  Thomas Horne 79 y.o. male MRN: [id number]  Unit/Bed#: -01 Encounter: [id number]        Inpatient consult to gastroenterology  Consult performed by: Tj Paulino PA-C  Consult ordered by: APOLLO Ordaz          Reason for Consult / Principal Problem: Anemia, heme positive stool    ASSESSMENT and PLAN:    Principal Problem:    Shortness of breath  Active Problems:    Primary hypertension    Hx of CABG    Type 2 diabetes mellitus with diabetic polyneuropathy, with long-term current use of insulin (HCC)    Chronic HFpEF/Moderate pHTN (HFpEF) (HCC)    Chronic kidney disease-mineral and bone disorder    Stable angina pectoris    Symptomatic anemia  History of duodenal ulcer    -Follow hemoglobin and transfuse as needed  - Hemoglobin currently stable 9.1 to 7.4 to 8.2 without transfusion.  -Agree pantoprazole drip  -Keep n.p.o. for now  -Plan EGD for Wednesday (last plavix Monday 4/8/24)  - Cardiology has seen and noted stable for GI procedures from cardiac perspective)  -------------------------------------------------------------------------------------------------------------------    HPI: 80 y/o white male with past medical history of coronary artery disease status post stenting currently and peripheral vascular disease status post stenting on aspirin and Plavix last dose Monday 4/8/24 AM, chronic anemia, prostate cancer, congestive heart failure, chronic kidney disease, diabetes, history of duodenal ulcer, hyperlipidemia, hypertension and sleep apnea who presents with shortness of breath and sense of feeling weak.  Has been going on since December 2023 but worsening over the past 2 weeks.  Dyspnea on exertion.  He does have chronic anemia again and is on iron tablets twice daily orally.  History of GI bleeding in December 2023 at which time EGD on December 26, 2023 showed a small nonbleeding duodenal ulcer.  Most recent colonoscopy on February  14, 2024 with no signs of bleeding.  Hemoglobin currently stable 9.1 to 7.4 to 8.2 without transfusion.  He does note history of constipation and dark stool, but is on oral iron.  He is also reporting a significant amount of chest discomfort for which she does have a cardiology consult placed.    I had seen him last in the office on February 22, 2024 as a hospital follow-up from the end of December 2023.      Endoscopic History:  EGD - December 26, 2023 showed a small nonbleeding duodenal ulcer  Colonoscopy - February 14, 2024 with no signs of bleeding    REVIEW OF SYSTEMS:    CONSTITUTIONAL: Reports decreased activity, chills and fatigue .  Denies any fever or rigors. Good appetite, and no recent weight loss.  HEENT: No earache or tinnitus. Denies hearing loss or visual disturbances.  CARDIOVASCULAR: Reports chest pain/pressure .  No palpitations.   RESPIRATORY: Reports SOB and OQUENDO, Denies any cough, hemoptysis  GASTROINTESTINAL: As noted in the History of Present Illness.   GENITOURINARY: No problems with urination. Denies any hematuria or dysuria.  NEUROLOGIC: Reports weakness.  No dizziness or vertigo, denies headaches.   MUSCULOSKELETAL: Denies any muscle or joint pain.   SKIN: Denies skin rashes or itching.   ENDOCRINE: Denies excessive thirst. Denies intolerance to heat or cold.  PSYCHOSOCIAL: Denies depression or anxiety. Denies any recent memory loss.       Historical Information   Past Medical History:   Diagnosis Date    Anemia     CAD (coronary artery disease)     Cancer (HCC)     prostate    CHF (congestive heart failure) (HCC)     Chronic kidney disease     Diabetes mellitus (HCC)     Duodenal ulcer     Hyperlipidemia     Hypertension     Myocardial infarction (HCC)     Neuropathy     Bilateral feet    Sleep apnea     Could not tolerate CPAP     Past Surgical History:   Procedure Laterality Date    ADENOIDECTOMY      CARDIAC CATHETERIZATION Left 10/19/2022    Procedure: Cardiac Left Heart Cath;   Surgeon: Danielle Pereira MD;  [facility];  Service: Cardiology    CARDIAC SURGERY      3 cardiac bypass then angioplasty 2020    CHOLECYSTECTOMY      COLONOSCOPY  2024    CORONARY ARTERY BYPASS GRAFT      IR LOWER EXTREMITY ANGIOGRAM  2023    [place] BYPASS W/VEIN FEMORAL-POPLITEAL Right 2023    Procedure: BYPASS FEMORAL-POPLITEAL WITH CRYO VEIN, RIGHT FEMORAL ENDARTERECTOMY;  Surgeon: Vasquez Clark MD;  [facility];  Service: Vascular    [place] SLCTV CATHJ 3RD+ ORD SLCTV ABDL PEL/LXTR BRNCH Right 2023    Procedure: ARTERIOGRAM Right lower extremity arteriogram with CO2 via right groin access;  Surgeon: Vasquez Clark MD;  [facility];  Service: Vascular    PROSTATE SURGERY      TONSILLECTOMY       Social History   Social History     Substance and Sexual Activity   Alcohol Use Yes    Alcohol/week: 14.0 standard drinks of alcohol    Types: 14 Cans of beer per week    Comment: 1 -2 daily     Social History     Substance and Sexual Activity   Drug Use Never     Social History     Tobacco Use   Smoking Status Former    Current packs/day: 0.00    Average packs/day: 1.5 packs/day for 33.0 years (49.5 ttl pk-yrs)    Types: Cigarettes    Start date:     Quit date:     Years since quittin.2   Smokeless Tobacco Never     Family History   Problem Relation Age of Onset    Diabetes Mother     Alcohol abuse Father     Mental illness Neg Hx        Meds/Allergies     Medications Prior to Admission   Medication    acetaminophen (TYLENOL) 325 mg tablet    amLODIPine (NORVASC) 10 mg tablet    aspirin 81 mg chewable tablet    atorvastatin (LIPITOR) 40 mg tablet    Blood Glucose Monitoring Suppl (OneTouch Verio Reflect) w/Device KIT    cloNIDine (CATAPRES) 0.1 mg tablet    clopidogrel (PLAVIX) 75 mg tablet    Droplet Pen Needles 32G X 4 MM MISC    Elastic Bandages & Supports (Medical Compression Stockings) MISC    Empagliflozin 25 MG TABS     fenofibrate 160 MG tablet    ferrous sulfate 324 (65 Fe) mg    gabapentin (NEURONTIN) 600 MG tablet    glimepiride (AMARYL) 2 mg tablet    glucose blood (OneTouch Verio) test strip    insulin glargine (LANTUS) 100 units/mL subcutaneous injection    isosorbide mononitrate (IMDUR) 60 mg 24 hr tablet    metFORMIN (GLUCOPHAGE) 500 mg tablet    metoprolol tartrate (LOPRESSOR) 50 mg tablet    Multiple Vitamins-Minerals (MULTIVITAMIN MEN 50+ PO)    nitroglycerin (NITROSTAT) 0.4 mg SL tablet    Omega-3 Fatty Acids (fish oil) 1,000 mg    ondansetron (ZOFRAN) 4 mg tablet    OneTouch Delica Lancets 33G MISC    pantoprazole (PROTONIX) 40 mg tablet    polyethylene glycol (GOLYTELY) 4000 mL solution    ranolazine (RANEXA) 500 mg 12 hr tablet    sitaGLIPtin (JANUVIA) 100 mg tablet    torsemide (DEMADEX) 20 mg tablet     Current Facility-Administered Medications   Medication Dose Route Frequency    acetaminophen (TYLENOL) tablet 650 mg  650 mg Oral Q6H PRN    amLODIPine (NORVASC) tablet 5 mg  5 mg Oral Daily    atorvastatin (LIPITOR) tablet 40 mg  40 mg Oral QPM    cloNIDine (CATAPRES) tablet 0.1 mg  0.1 mg Oral Q12H CONSTANTINE    dextrose 5 % and sodium chloride 0.9 % infusion  50 mL/hr Intravenous Continuous    fenofibrate (TRICOR) tablet 145 mg  145 mg Oral Daily    ferrous sulfate tablet 325 mg  325 mg Oral BID With Meals    gabapentin (NEURONTIN) capsule 400 mg  400 mg Oral BID    insulin glargine (LANTUS) subcutaneous injection 5 Units 0.05 mL  5 Units Subcutaneous HS    insulin lispro (HumALOG/ADMELOG) 100 units/mL subcutaneous injection 1-6 Units  1-6 Units Subcutaneous Q6H CONSTANTINE    isosorbide mononitrate (IMDUR) 24 hr tablet 60 mg  60 mg Oral Daily    metoprolol tartrate (LOPRESSOR) tablet 50 mg  50 mg Oral Q12H CONSTANTINE    pantoprazole (PROTONIX) 80 mg in sodium chloride 0.9 % 100 mL infusion  8 mg/hr Intravenous Continuous    ranolazine (RANEXA) 12 hr tablet 500 mg  500 mg Oral BID       Allergies   Allergen Reactions    Lisinopril  Rash and Lip Swelling           Objective     Blood pressure 122/60, pulse (!) 48, temperature 97.6 °F (36.4 °C), temperature source Oral, resp. rate 16, weight 112 kg (245 lb 13 oz), SpO2 98%.      Intake/Output Summary (Last 24 hours) at 4/9/2024 0848  Last data filed at 4/8/2024 2150  Gross per 24 hour   Intake 350 ml   Output --   Net 350 ml         PHYSICAL EXAM:      General Appearance:   Alert, cooperative, no distress, appears stated age    HEENT:   Normocephalic, atraumatic, sclera anicteric   Neck:  Supple, symmetrical   Lungs:   Clear to auscultation bilaterally; no rales, rhonchi or wheezing; respirations unlabored    Heart::   S1 and S2 normal; regular rate and rhythm; no murmur, rub, or gallop.   Abdomen:   Soft, non-tender, non-distended; normal bowel sounds; no masses, no organomegaly    Genitalia:   Deferred    Rectal:   Deferred    Extremities:  No cyanosis, clubbing or edema    Pulses:  2+ and symmetric all extremities    Skin:  Skin color, texture normal, no rashes or lesions    Lymph nodes:  Not assessed  Neuro: alert and oriented x 3  Psych: normal behavior       Lab Results:   Results from last 7 days   Lab Units 04/09/24  0347 04/09/24  0300   WBC Thousand/uL  --  6.10   HEMOGLOBIN g/dL 8.2* 7.4*   HEMATOCRIT % 29.3* 27.0*   PLATELETS Thousands/uL  --  244   NEUTROS PCT %  --  57   LYMPHS PCT %  --  31   MONOS PCT %  --  8   EOS PCT %  --  3     Results from last 7 days   Lab Units 04/09/24  0300 04/08/24  1900   POTASSIUM mmol/L 3.6 4.0   CHLORIDE mmol/L 107 103   CO2 mmol/L 27 29   BUN mg/dL 33* 37*   CREATININE mg/dL 1.67* 2.04*   CALCIUM mg/dL 8.3* 9.2   ALK PHOS U/L  --  37   ALT U/L  --  12   AST U/L  --  16               Imaging Studies: I have personally reviewed pertinent imaging studies.    No results found.        Patient was seen and examined by Dr. Yan. All sidhu medical decisions were made by Dr. Yan. Thank you for allowing us to participate in the care of this present patient.  We will follow-up with you closely.      Tj Paulino PA-C  Gastroenterology

## 2024-04-09 NOTE — ASSESSMENT & PLAN NOTE
Ongoing chest pain since November.  Chest pain improves when resting.   Troponin negative, EKG nonischemic   Maintained on ranexa, imdur.  Can consider increasing  Cardio following

## 2024-04-09 NOTE — UTILIZATION REVIEW
Initial Clinical Review  OBSERVATION  4/8/24 @ 2048  CONVERTED TO INPATIENT ADMISSION 4/9/24 @ 1422  DUE TO CONTINUED STAY REQUIRED TO EVALUATE AND TREAT PATIENT WITH SYMPTOMATIC ANEMIA WITH ONGOING WORKUP. GI AND CARDIOLOGY FOLLOWING . IV PPI DRIP.     Admission: Date/Time/Statement:   Admission Orders (From admission, onward)       Ordered        04/09/24 1422  INPATIENT ADMISSION  Once            04/08/24 2048  Place in Observation  Once                          Orders Placed This Encounter   Procedures    INPATIENT ADMISSION     Standing Status:   Standing     Number of Occurrences:   1     Order Specific Question:   Level of Care     Answer:   Med Surg [16]     Order Specific Question:   Estimated length of stay     Answer:   More than 2 Midnights     Order Specific Question:   Certification     Answer:   I certify that inpatient services are medically necessary for this patient for a duration of greater than two midnights. See H&P and MD Progress Notes for additional information about the patient's course of treatment.     ED Arrival Information       Expected   -    Arrival   4/8/2024 18:44    Acuity   Urgent              Means of arrival   Wheelchair    Escorted by   Family Member    Service   Hospitalist    Admission type   Emergency              Arrival complaint   Chest pain  dizziness  bloody stool             Chief Complaint   Patient presents with    Shortness of Breath     Patient states SOB, weakness starting a few days ago. Normal appetite. Denies n/v/d       Initial Presentation: 79 y.o. male with hx PUD, CAD s/p CABG, CKD, DM 2, AAA s/p EVAR, CHF, HTN, prostate CA sp prostatectomy who presents with dark stools x 2 days.  Patient is on iron BID . Pt c/o  shortness of breath and chest pain ongoing since November. Symptoms improve with rest. Last admission his EGD showed duodenal ulcer as bleeding source .   On exam, bradycardic. Hem occult positive . Wt up 3 lbs . Labs - tropnin neg , hgb 9.1, BUN  37  .  (prior 453) .ECG nonischemic  Pt given IVF, started on Protonix drip in ED. PT admitted as OBS to telemetry with SOB, symptomatic anemia, stable angina pectoris . Plan - telemetry, continue PPI drip. GI consult. Trend CBC. Hold ASA and plavix . NPO . IVF . Cardiology consult. Maintained on ranexa, imdur. Low sodium diet and fluid restriction. F/U CXR .     Date: 4/9 Converted to IP    GI consult- Noted to have slight drop in hemoglobin from 9.1->7.4 -->8.2 w/o intervention . Patient reports having problems with constipation and darker stool on iron. Symptomatic anemia- rule out any peptic ulcer disease especially with his history. Plan- EGD tomorrow, Continue PPI drip. . Monitor Hgb .   Cardiology consult- chronic stable angina, but in the last 10 days has gained 10 pounds, had increased dyspnea on exertion, and has slightly increased level of yet stable exertional angina . Suspect much of his slight increase in cardiac symptoms is related to his worsening anemia. CXR- Bilateral pulmonary edema with pleural effusions R>L    On exam, show slight JVD, mild peripheral edema. PLan- IV lasix x1 , cautious diuresis. Stable from a cardiac perspective to proceed with GI procedure/EGD . Holding ASA and Plavix . Continue antianginal regimen.   Medicine- Torsemide held overnight. Creat improved to 1.6 today . C/O significant dyspnea on exertion which currently is most likely from anemia rather than heart failure. Complain of ongoing chest discomfort . Cardio and GI following . Serial H/H q12h .       ED Triage Vitals   Temperature Pulse Respirations Blood Pressure SpO2   04/08/24 1857 04/08/24 1857 04/08/24 1857 04/08/24 1901 04/08/24 1857   97.5 °F (36.4 °C) 56 20 126/59 99 %      Temp Source Heart Rate Source Patient Position - Orthostatic VS BP Location FiO2 (%)   04/08/24 1857 04/08/24 1857 04/08/24 1930 04/08/24 1930 --   Oral Monitor Lying Right arm       Pain Score       04/09/24 0754       No Pain           Wt Readings from Last 1 Encounters:   04/09/24 112 kg (245 lb 13 oz)     Additional Vital Signs:   Date/Time Temp Pulse Resp BP MAP (mmHg) SpO2 O2 Device Patient Position - Orthostatic VS   04/09/24 0758 -- -- -- -- -- -- None (Room air) --   04/09/24 0755 97.6 °F (36.4 °C) 48 Abnormal  16 122/60 -- 98 % None (Room air) Sitting   04/09/24 0630 -- 46 Abnormal  17 134/63 91 93 % -- Lying   04/09/24 0600 -- 47 Abnormal  14 145/67 96 94 % -- --   04/09/24 0400 -- 47 Abnormal  18 130/65 92 95 % -- Lying   04/09/24 0300 -- 46 Abnormal  26 Abnormal  114/56 79 93 % -- Lying   04/09/24 0200 -- 46 Abnormal  -- 138/65 93 94 % -- Lying   04/09/24 0100 -- 48 Abnormal  -- 137/63 90 92 % -- --   04/09/24 0055 -- -- -- 131/62 -- -- -- --   04/09/24 0045 -- 51 Abnormal  -- 131/62 89 93 % -- Lying   04/08/24 2200 -- 51 Abnormal  -- 143/65 93 96 % -- Lying   04/08/24 2130 -- 52 Abnormal  -- 129/62 89 96 % -- --   04/08/24 2100 -- 50 Abnormal  -- 154/67 96 94 % -- Lying   04/08/24 1936 -- -- -- -- -- -- None (Room air) --   04/08/24 1930 -- 52 Abnormal  -- 122/61 86 92 % --      Pertinent Labs/Diagnostic Test Results:    4/8 ECG-   Rate:    ECG rate assessment: bradycardic    Rhythm:    Rhythm: sinus bradycardia and A-V block    Ectopy:    Ectopy: PVCs      PVCs:  Infrequent  QRS:    QRS axis:  Normal    QRS intervals:  Wide  Conduction:    Conduction: normal    ST segments:    ST segments:  Non-specific  T waves:    T waves: non-specific    Other findings:    Other findings: LVH               XR chest 1 view portable    (Results Pending)         Results from last 7 days   Lab Units 04/09/24  0347 04/09/24  0300 04/08/24  1900   WBC Thousand/uL  --  6.10 8.48   HEMOGLOBIN g/dL 8.2* 7.4* 9.1*   HEMATOCRIT % 29.3* 27.0* 33.1*   PLATELETS Thousands/uL  --  244 314   TOTAL NEUT ABS Thousands/µL  --  3.50 5.89         Results from last 7 days   Lab Units 04/09/24  0300 04/08/24  1900   SODIUM mmol/L 139 138   POTASSIUM mmol/L 3.6 4.0  "  CHLORIDE mmol/L 107 103   CO2 mmol/L 27 29   ANION GAP mmol/L 5 6   BUN mg/dL 33* 37*   CREATININE mg/dL 1.67* 2.04*   EGFR ml/min/1.73sq m 37 29   CALCIUM mg/dL 8.3* 9.2   MAGNESIUM mg/dL 2.1  --      Results from last 7 days   Lab Units 04/08/24  1900   AST U/L 16   ALT U/L 12   ALK PHOS U/L 37   TOTAL PROTEIN g/dL 7.8   ALBUMIN g/dL 4.2   TOTAL BILIRUBIN mg/dL 0.34     Results from last 7 days   Lab Units 04/09/24  1144 04/09/24  0537 04/09/24  0246 04/09/24  0222 04/09/24  0043   POC GLUCOSE mg/dl 103 81 94 74 85     Results from last 7 days   Lab Units 04/09/24  0300 04/08/24  1900   GLUCOSE RANDOM mg/dL 87 137         Results from last 7 days   Lab Units 04/08/24  2329   HEMOGLOBIN A1C % 7.2*   EAG mg/dl 160     No results found for: \"BETA-HYDROXYBUTYRATE\"                   Results from last 7 days   Lab Units 04/08/24  2329 04/08/24  2105 04/08/24  1900   HS TNI 0HR ng/L  --   --  29   HS TNI 2HR ng/L  --  19  --    HSTNI D2 ng/L  --  -10  --    HS TNI 4HR ng/L 27  --   --    HSTNI D4 ng/L -2  --   --                                  Results from last 7 days   Lab Units 04/08/24  1900   BNP pg/mL 729*     Results from last 7 days   Lab Units 04/08/24  1900   FERRITIN ng/mL 10*   IRON SATURATION % 5*   IRON ug/dL 24*   TIBC ug/dL 454*                                                                                   ED Treatment:   Medication Administration from 04/08/2024 1844 to 04/09/2024 0754         Date/Time Order Dose Route Action Comments     04/08/2024 2150 EDT sodium chloride 0.9 % bolus 250 mL 0 mL Intravenous Stopped --     04/08/2024 2050 EDT sodium chloride 0.9 % bolus 250 mL 250 mL Intravenous New Bag --     04/08/2024 2121 EDT pantoprazole (PROTONIX) 80 mg in sodium chloride 0.9 % 100 mL IVPB 0 mg Intravenous Stopped --     04/08/2024 2106 EDT pantoprazole (PROTONIX) 80 mg in sodium chloride 0.9 % 100 mL IVPB 80 mg Intravenous New Bag --     04/08/2024 2121 EDT pantoprazole (PROTONIX) 80 mg in " sodium chloride 0.9 % 100 mL infusion 8 mg/hr Intravenous New Bag --     04/09/2024 0056 EDT insulin glargine (LANTUS) subcutaneous injection 5 Units 0.05 mL 5 Units Subcutaneous Given --     04/09/2024 0055 EDT atorvastatin (LIPITOR) tablet 40 mg 40 mg Oral Given --     04/09/2024 0055 EDT cloNIDine (CATAPRES) tablet 0.1 mg 0.1 mg Oral Given --     04/09/2024 0108 EDT metoprolol tartrate (LOPRESSOR) tablet 50 mg 0 mg Oral Hold --     04/09/2024 0611 EDT insulin lispro (HumALOG/ADMELOG) 100 units/mL subcutaneous injection 1-6 Units -- Subcutaneous Not Given bg 81     04/09/2024 0050 EDT insulin lispro (HumALOG/ADMELOG) 100 units/mL subcutaneous injection 1-6 Units -- Subcutaneous Not Given bg 85     04/09/2024 0132 EDT dextrose 5 % and sodium chloride 0.9 % infusion 50 mL/hr Intravenous New Bag --     04/09/2024 0537 EDT potassium chloride (Klor-Con M20) CR tablet 20 mEq 20 mEq Oral Given --          Past Medical History:   Diagnosis Date    Anemia     CAD (coronary artery disease)     Cancer (HCC)     prostate    CHF (congestive heart failure) ([facility])     Chronic kidney disease     Diabetes mellitus (HCC)     Duodenal ulcer     Hyperlipidemia     Hypertension     Myocardial infarction (HCC)     Neuropathy     Bilateral feet    Sleep apnea     Could not tolerate CPAP     Present on Admission:   Primary hypertension   Chronic HFpEF/Moderate pHTN (HFpEF) ([facility])   Symptomatic anemia   Stable angina pectoris   Chronic kidney disease-mineral and bone disorder      Admitting Diagnosis: Chest pain [R07.9]  SOB (shortness of breath) [R06.02]  Anemia [D64.9]  GI bleed [K92.2]  Hx of CABG [Z95.1]  Acute on chronic kidney failure  (HCC) [N17.9, N18.9]  Age/Sex: 79 y.o. male  Admission Orders:  Scheduled Medications:  amLODIPine, 5 mg, Oral, Daily  atorvastatin, 40 mg, Oral, QPM  cloNIDine, 0.1 mg, Oral, Q12H CONSTANTINE  fenofibrate, 145 mg, Oral, Daily  ferrous sulfate, 325 mg, Oral, BID With Meals  gabapentin, 400 mg, Oral,  BID  insulin glargine, 5 Units, Subcutaneous, HS  insulin lispro, 1-6 Units, Subcutaneous, Q6H CONSTANTINE  isosorbide mononitrate, 60 mg, Oral, Daily  metoprolol tartrate, 50 mg, Oral, Q12H CONSTANTINE  ranolazine, 500 mg, Oral, BID      furosemide (LASIX) injection 40 mg  Dose: 40 mg  Freq: Once Route: IV x1 4/9 @ 1246          Continuous IV Infusions:  dextrose 5 % and sodium chloride 0.9 %, 50 mL/hr, Intravenous, Continuous  pantoprazole (PROTONIX) 80 mg in sodium chloride 0.9 % 100 mL infusion, 8 mg/hr, Intravenous, Continuous      PRN Meds:  acetaminophen, 650 mg, Oral, Q6H PRN    NPO   H/H q8h   Telemetry   SCD   OOB to chair TID    POCT glucose       IP CONSULT TO GASTROENTEROLOGY  IP CONSULT TO CARDIOLOGY    Network Utilization Review Department  ATTENTION: Please call with any questions or concerns to [phone] and carefully listen to the prompts so that you are directed to the right person. All voicemails are confidential.   For Discharge needs, contact Care Management [place] Support Team at [phone] opt. 2  Send all requests for admission clinical reviews, approved or denied determinations and any other requests to dedicated fax number below belonging to the campus where the patient is receiving treatment. List of dedicated fax numbers for the Facilities:  FACILITY NAME UR FAX NUMBER   ADMISSION DENIALS (Administrative/Medical Necessity) [phone]   DISCHARGE SUPPORT TEAM (NETWORK) [phone]   PARENT CHILD HEALTH (Maternity/NICU/Pediatrics) [phone]   [facility] [phone]   [facility] [phone]   [facility] [phone]   [facility] [phone]   formerly [facility] [phone]   [facility] [phone]   [facility] [phone]   [facility] [phone]   [facility]  [place] [phone]   [facility] [phone]   [facility] [phone]   [facility] [phone]

## 2024-04-09 NOTE — ASSESSMENT & PLAN NOTE
Ongoing chest pain since November.  Chest pain improves when resting.   Maintained on ranexa, imdur.  Can consider increasing doses

## 2024-04-09 NOTE — ASSESSMENT & PLAN NOTE
Lab Results   Component Value Date    HGBA1C 7.0 (H) 10/04/2023     Resume PO meds on dc  Decrease lantus from 15 units HS to 5 units HS due to decreased intake  Accucheck, SSI

## 2024-04-09 NOTE — ED NOTES
Patient's BG 85. D/t NPO status, this RN reaching out to discuss maintenance fluids such as D5 1/2 Nacl to maintain BG.     Meagan Anguiano RN  04/09/24 0104

## 2024-04-09 NOTE — ASSESSMENT & PLAN NOTE
Examines euvolemic   + 3 lbs,  (prior 453)  Last echocardiogram 6/2023: 50%, grade II diastolic dysfunction.  Monitor intake and output, daily weights.  Low sodium diet and fluid restriction.  Continue home medications.  Metoprolol BID  Torsemid 20 mg QD - on hold for now, await GI eval

## 2024-04-09 NOTE — PLAN OF CARE

## 2024-04-09 NOTE — ASSESSMENT & PLAN NOTE
BP stable  Torsemide on hold for now because of increasing creatinine on admission.  Continue metoprolol, clonidine, amlodipine

## 2024-04-09 NOTE — CASE MANAGEMENT
Case Management Assessment & Discharge Planning Note    Patient name Thomas Horne  Location /-01 MRN [id number]  : 1945 Date 2024       Current Admission Date: 2024  Current Admission Diagnosis:Shortness of breath   Patient Active Problem List    Diagnosis Date Noted    Shortness of breath 2024    History of colon polyps 2024    Duodenal ulcer 2024    Acute blood loss anemia 2024    Multiple gastric ulcers 2023    Iron deficiency anemia due to chronic blood loss 2023    Melena 2023    Symptomatic anemia 2023    Acute respiratory distress 2023    Frequent PVCs 2023    Acute on chronic diastolic heart failure (HCC) 2023    Acute kidney injury superimposed on chronic kidney disease  ([facility]) 2023    Diabetic ulcer of right midfoot associated with diabetes mellitus due to underlying condition, limited to breakdown of skin ([facility]) 06/15/2023    Hypertensive urgency 2023    Elevated troponin level not due myocardial infarction 2023    Stable angina pectoris 2023    Chronic kidney disease-mineral and bone disorder 2022    Mild aortic stenosis 2022    Chronic HFpEF/Moderate pHTN (HFpEF) ([facility]) 11/10/2022    Gross hematuria 2022    Platelets decreased ([facility]) 2022    Acute kidney injury superimposed on chronic kidney disease  ([facility]) 2022    Actinic keratoses 2022    Type 2 diabetes mellitus with diabetic peripheral angiopathy without gangrene, with long-term current use of insulin (HCC) 2022    Varicose veins of left lower extremity 2021    History of endovascular stent graft for abdominal aortic aneurysm (AAA) 2021    Bruit (arterial) 2021    Peripheral artery disease (HCC) 2021    Type 2 diabetes mellitus with diabetic polyneuropathy, with long-term current use of insulin ([facility]) 06/10/2021    Mixed hyperlipidemia 2021    Primary  hypertension 05/11/2021    Coronary artery disease involving native coronary artery of native heart without angina pectoris 05/11/2021    S/P angioplasty with stent 05/11/2021    Hx of CABG 05/11/2021    S/P AAA repair 05/11/2021    S/P prostatectomy 05/11/2021    H/O prostate cancer 05/11/2021      LOS (days): 0  Geometric Mean LOS (GMLOS) (days):   Days to GMLOS:     OBJECTIVE:              Current admission status: Observation       Preferred Pharmacy:   [id number] - [place], [place] - [address]  [address] [facility] [address]  Phone: [phone] Fax: [phone]    [facility] - [place], [place] - [address]  [address]  [facility] [address]  [facility] [address]  Phone: [phone] Fax: [phone]    [facility] [place], [place] - [facility] [address] [place] [address] [address] [facility] [address]  [place] [address]  Phone: [phone] Fax: [phone]    Primary Care Provider: Frank Lombardi, DO    Primary Insurance: JUAN BROOKS  Secondary Insurance:     ASSESSMENT:  Active Health Care Proxies    There are no active Health Care Proxies on file.                 Readmission Root Cause  30 Day Readmission: No    Patient Information  Admitted from:: Home  Mental Status: Alert  During Assessment patient was accompanied by: Not accompanied during assessment  Assessment information provided by:: Patient  Primary Caregiver: Self  Support Systems: Daughter  Home entry access options. Select all that apply.: Stairs  Number of steps to enter home.: 4  Do the steps have railings?: Yes  Type of Current Residence: 3 story home  Upon entering residence, is there a bedroom on the main floor (no further steps)?: No  A bedroom is located on the following floor levels of residence (select all that apply):: 2nd Floor  Upon entering residence, is there a bathroom on the main floor (no further steps)?: No  Indicate which floors of current residence have a bathroom (select all the apply):: 2nd  Floor  Number of steps to 2nd floor from main floor: One Flight  Living Arrangements: Lives w/ Daughter  Is patient a ?: No    Activities of Daily Living Prior to Admission  Functional Status: Independent  Completes ADLs independently?: Yes  Ambulates independently?: Yes  Does patient use assisted devices?: No  Does patient currently own DME?: No  Does patient have a history of Outpatient Therapy (PT/OT)?: No  Does the patient have a history of Short-Term Rehab?: No  Does patient have a history of HHC?: No  Does patient currently have HHC?: No         Patient Information Continued  Income Source: Pension/[facility]  Does patient have prescription coverage?: Yes  Does patient receive dialysis treatments?: No  Does patient have a history of substance abuse?: No  Does patient have a history of Mental Health Diagnosis?: No         Means of Transportation  Means of Transport to Appts:: Drives Self      Social Determinants of Health (SDOH)      Flowsheet Row Most Recent Value   Housing Stability    In the last 12 months, was there a time when you were not able to pay the mortgage or rent on time? N   In the last 12 months, how many places have you lived? 1   In the last 12 months, was there a time when you did not have a steady place to sleep or slept in a shelter (including now)? N   Transportation Needs    In the past 12 months, has lack of transportation kept you from medical appointments or from getting medications? no   In the past 12 months, has lack of transportation kept you from meetings, work, or from getting things needed for daily living? No   Food Insecurity    Within the past 12 months, you worried that your food would run out before you got the money to buy more. Never true   Within the past 12 months, the food you bought just didn't last and you didn't have money to get more. Never true   Utilities    In the past 12 months has the electric, gas, oil, or water company threatened to shut off services  in your home? No            DISCHARGE DETAILS:    Discharge planning discussed with:: Patient  Freedom of Choice: Yes  Comments - Freedom of Choice: CM met with patient at bedside. CM introduced self and CM role. Patient lives with his daughter in a 3 story home. Patient is independent, no ADs to ambulate, no O2, no HHC services at this time. Patient drives. PCP and pharmacy verified. Daughter will give patient a ride home. Patient anticipates returning home, no CM needs  CM contacted family/caregiver?: Yes  Were Treatment Team discharge recommendations reviewed with patient/caregiver?: Yes  Did patient/caregiver verbalize understanding of patient care needs?: Yes  Were patient/caregiver advised of the risks associated with not following Treatment Team discharge recommendations?: Yes    Contacts  Patient Contacts: Whitley  Relationship to Patient:: Family  Contact Method: Phone  Phone Number: [phone]  Reason/Outcome: Emergency Contact, Discharge Planning              Other Referral/Resources/Interventions Provided:  Referral Comments: Evaluations pending

## 2024-04-09 NOTE — ASSESSMENT & PLAN NOTE
Presented for two days of dark stools, though is on iron as op.  Hgb stable 9.1 the drop down to 7.4, increased to 8.2 without any intervention.  Hem occult positive in ED and was subsequently started on protonix drip  ASA, plavix on hold  NPO  GI following.  Plans for EGD.

## 2024-04-10 ENCOUNTER — APPOINTMENT (INPATIENT)
Dept: GASTROENTEROLOGY | Facility: HOSPITAL | Age: 79
DRG: 377 | End: 2024-04-10
Payer: COMMERCIAL

## 2024-04-10 ENCOUNTER — ANESTHESIA EVENT (INPATIENT)
Dept: GASTROENTEROLOGY | Facility: HOSPITAL | Age: 79
DRG: 377 | End: 2024-04-10
Payer: COMMERCIAL

## 2024-04-10 ENCOUNTER — ANESTHESIA (INPATIENT)
Dept: GASTROENTEROLOGY | Facility: HOSPITAL | Age: 79
DRG: 377 | End: 2024-04-10
Payer: COMMERCIAL

## 2024-04-10 LAB
ANION GAP SERPL CALCULATED.3IONS-SCNC: 6 MMOL/L (ref 4–13)
BUN SERPL-MCNC: 27 MG/DL (ref 5–25)
CALCIUM SERPL-MCNC: 9.1 MG/DL (ref 8.4–10.2)
CHLORIDE SERPL-SCNC: 105 MMOL/L (ref 96–108)
CO2 SERPL-SCNC: 28 MMOL/L (ref 21–32)
CREAT SERPL-MCNC: 1.61 MG/DL (ref 0.6–1.3)
GFR SERPL CREATININE-BSD FRML MDRD: 39 ML/MIN/1.73SQ M
GLUCOSE SERPL-MCNC: 129 MG/DL (ref 65–140)
GLUCOSE SERPL-MCNC: 130 MG/DL (ref 65–140)
GLUCOSE SERPL-MCNC: 136 MG/DL (ref 65–140)
GLUCOSE SERPL-MCNC: 138 MG/DL (ref 65–140)
GLUCOSE SERPL-MCNC: 155 MG/DL (ref 65–140)
GLUCOSE SERPL-MCNC: 98 MG/DL (ref 65–140)
HGB BLD-MCNC: 9 G/DL (ref 12–17)
MAGNESIUM SERPL-MCNC: 2.1 MG/DL (ref 1.9–2.7)
POTASSIUM SERPL-SCNC: 3.9 MMOL/L (ref 3.5–5.3)
SODIUM SERPL-SCNC: 139 MMOL/L (ref 135–147)

## 2024-04-10 PROCEDURE — C9113 INJ PANTOPRAZOLE SODIUM, VIA: HCPCS

## 2024-04-10 PROCEDURE — 99232 SBSQ HOSP IP/OBS MODERATE 35: CPT | Performed by: INTERNAL MEDICINE

## 2024-04-10 PROCEDURE — 83735 ASSAY OF MAGNESIUM: CPT

## 2024-04-10 PROCEDURE — 80048 BASIC METABOLIC PNL TOTAL CA: CPT

## 2024-04-10 PROCEDURE — 43235 EGD DIAGNOSTIC BRUSH WASH: CPT | Performed by: INTERNAL MEDICINE

## 2024-04-10 PROCEDURE — 82948 REAGENT STRIP/BLOOD GLUCOSE: CPT

## 2024-04-10 PROCEDURE — 85018 HEMOGLOBIN: CPT | Performed by: INTERNAL MEDICINE

## 2024-04-10 PROCEDURE — C9113 INJ PANTOPRAZOLE SODIUM, VIA: HCPCS | Performed by: INTERNAL MEDICINE

## 2024-04-10 PROCEDURE — 0DJ08ZZ INSPECTION OF UPPER INTESTINAL TRACT, VIA NATURAL OR ARTIFICIAL OPENING ENDOSCOPIC: ICD-10-PCS | Performed by: INTERNAL MEDICINE

## 2024-04-10 RX ORDER — PROPOFOL 10 MG/ML
INJECTION, EMULSION INTRAVENOUS AS NEEDED
Status: DISCONTINUED | OUTPATIENT
Start: 2024-04-10 | End: 2024-04-10

## 2024-04-10 RX ORDER — SODIUM CHLORIDE, SODIUM LACTATE, POTASSIUM CHLORIDE, CALCIUM CHLORIDE 600; 310; 30; 20 MG/100ML; MG/100ML; MG/100ML; MG/100ML
INJECTION, SOLUTION INTRAVENOUS CONTINUOUS PRN
Status: DISCONTINUED | OUTPATIENT
Start: 2024-04-10 | End: 2024-04-10

## 2024-04-10 RX ORDER — INSULIN LISPRO 100 [IU]/ML
1-6 INJECTION, SOLUTION INTRAVENOUS; SUBCUTANEOUS EVERY 6 HOURS SCHEDULED
Status: DISCONTINUED | OUTPATIENT
Start: 2024-04-10 | End: 2024-04-11 | Stop reason: HOSPADM

## 2024-04-10 RX ORDER — LIDOCAINE HYDROCHLORIDE 20 MG/ML
INJECTION, SOLUTION EPIDURAL; INFILTRATION; INTRACAUDAL; PERINEURAL AS NEEDED
Status: DISCONTINUED | OUTPATIENT
Start: 2024-04-10 | End: 2024-04-10

## 2024-04-10 RX ADMIN — CLONIDINE HYDROCHLORIDE 0.1 MG: 0.1 TABLET ORAL at 08:06

## 2024-04-10 RX ADMIN — PROPOFOL 50 MG: 10 INJECTION, EMULSION INTRAVENOUS at 14:07

## 2024-04-10 RX ADMIN — SODIUM CHLORIDE 8 MG/HR: 9 INJECTION, SOLUTION INTRAVENOUS at 17:20

## 2024-04-10 RX ADMIN — ATORVASTATIN CALCIUM 40 MG: 40 TABLET, FILM COATED ORAL at 17:21

## 2024-04-10 RX ADMIN — GABAPENTIN 400 MG: 400 CAPSULE ORAL at 17:21

## 2024-04-10 RX ADMIN — ISOSORBIDE MONONITRATE 60 MG: 60 TABLET, EXTENDED RELEASE ORAL at 08:06

## 2024-04-10 RX ADMIN — PROPOFOL 100 MG: 10 INJECTION, EMULSION INTRAVENOUS at 14:05

## 2024-04-10 RX ADMIN — FENOFIBRATE 145 MG: 145 TABLET ORAL at 08:06

## 2024-04-10 RX ADMIN — AMLODIPINE BESYLATE 5 MG: 5 TABLET ORAL at 08:06

## 2024-04-10 RX ADMIN — GABAPENTIN 400 MG: 400 CAPSULE ORAL at 08:06

## 2024-04-10 RX ADMIN — RANOLAZINE 500 MG: 500 TABLET, FILM COATED, EXTENDED RELEASE ORAL at 17:21

## 2024-04-10 RX ADMIN — FERROUS SULFATE TAB 325 MG (65 MG ELEMENTAL FE) 325 MG: 325 (65 FE) TAB at 08:06

## 2024-04-10 RX ADMIN — INSULIN GLARGINE 5 UNITS: 100 INJECTION, SOLUTION SUBCUTANEOUS at 22:08

## 2024-04-10 RX ADMIN — RANOLAZINE 500 MG: 500 TABLET, FILM COATED, EXTENDED RELEASE ORAL at 08:06

## 2024-04-10 RX ADMIN — FERROUS SULFATE TAB 325 MG (65 MG ELEMENTAL FE) 325 MG: 325 (65 FE) TAB at 17:21

## 2024-04-10 RX ADMIN — CLONIDINE HYDROCHLORIDE 0.1 MG: 0.1 TABLET ORAL at 22:08

## 2024-04-10 RX ADMIN — SODIUM CHLORIDE 8 MG/HR: 9 INJECTION, SOLUTION INTRAVENOUS at 06:35

## 2024-04-10 RX ADMIN — LIDOCAINE HYDROCHLORIDE 100 MG: 20 INJECTION, SOLUTION EPIDURAL; INFILTRATION; INTRACAUDAL; PERINEURAL at 14:05

## 2024-04-10 RX ADMIN — SODIUM CHLORIDE, SODIUM LACTATE, POTASSIUM CHLORIDE, AND CALCIUM CHLORIDE: .6; .31; .03; .02 INJECTION, SOLUTION INTRAVENOUS at 14:02

## 2024-04-10 RX ADMIN — METOPROLOL TARTRATE 50 MG: 50 TABLET, FILM COATED ORAL at 08:06

## 2024-04-10 NOTE — ASSESSMENT & PLAN NOTE
BP stable  Torsemide on hold for now because of increasing creatinine on admission.  Continue clonidine, amlodipine   Metoprolol on hold because of bradycardia

## 2024-04-10 NOTE — ASSESSMENT & PLAN NOTE
ASA, plavix on hold  Continue statin  Metoprolol on hold because of bradycardia  Patient evaluated by cardiology.

## 2024-04-10 NOTE — ASSESSMENT & PLAN NOTE
Lab Results   Component Value Date    HGBA1C 7.2 (H) 04/08/2024     Continue Lantus 15 units with sliding scale  Accucheck, SSI

## 2024-04-10 NOTE — ASSESSMENT & PLAN NOTE
Presented for two days of dark stools, though is on iron as op.  Hgb stable 9.1 the drop down to 7.4, increased to 8.2 without any intervention.  Improved to 9 this morning.  Continue IV Protonix  ASA, plavix on hold  NPO  GI following.  Plans for EGD today.

## 2024-04-10 NOTE — PROGRESS NOTES
General Cardiology   Progress Note -  Team One   Thomas Horne 79 y.o. male MRN: [id number]    Unit/Bed#: -01 Encounter: [id number]    Assessment/ Plan  Chest Pain  Exertional CP and OQUENDO   Progressively worsened since stool changes, unable to walk same distances as prior   Has chronic stable angina hx   Initial presentation Afebrile, HR 56, 126/59, 99% RA   CBC- Hgb 9.1- baseline 8-9  CMP- BUN/Cr 37/2.04, baseline 1.6-1.8  Troponin 29, 19, 27    EKG- Sinus Bradycardia, 1st Degree AV Block  Chest Xray- Bilateral pulmonary edema with pleural effusions R>L        Plan-   Would recommend continuing home regimen Ranexa  Significant improved s/p Lasix dose      Black/Tarry Stools  Hx of JESUS on Fe supplementation, and Internal Hemorrhoids  Previous EGD 2023- Multiple small clean based shallow superficial ulcers in the bulb  Home regimen includes ASA and Plavix which have since been on hold   Hgb baseline 9-10  Hgb dropped 7.4, now 8.2     Plan-   GI Following   Plans for EGD today   Last Plavix dose 4/8  If no active bleeding recommend restarting ASA and Plavix      Acute on Chronic HFpEF Exacerbation   Trigger likely related to JESUS and GI Bleed   ECHO 2023 - EF 50%, mild global hypokinesis, grade 2 diastolic dysfunction, mild to moderate AS  Home regimen includes Jardiance, Lopressor, Torsemide 20mg  Cxray- Vascular congestion, volume overload   Weight 229 down from 245  I/Os- 850    Plan-   Restart Home Torsemide 20mg tomorrow   Monitor Daily Weights   Monitor I/Os  Monitor BMP        Bradycardia  EKG- Sinus Bradycardia, 1st Degree AV Block  Home regimen includes Metoprolol tartrate 50mg BID   On exam viridiana to the 40-50s and symptomatic   Received morning dose of Metoprolol     Plan-   Hold on any further doses of Metoprolol today   Place on telemetry   Tomorrow start Metoprolol 25mg BID         CKD III  Baseline appears to be around 1.6-1.8  Cr on admission 2.09, has since improved 1.61     Plan-   Monitor  in the setting of diuresis and restarting home torsemide   Monitor BMP         Coronary Artery Disease   Hx of CABGx3 in   Stent in L Circumflex  % occluded     Plan-   ASA and Plavix on hold in setting of GI bleed   Metoprolol also on hold in setting of bradycardia     Subjective  Patient was seen and examined at the bedside. Patient was resting comfortably in no overt acute distress. Patient endorse significant  improvement with his sx. Patient noted that with exertion he did not experience CP or SOB. When re-evaluated patient was complaining of feeling off and had brain fog. It was noted that patient was bradycardic in the 40s however BP at that time was 120/62, . Patient denied any CP, SOB, palpitations, near syncope, nausea, abdominal pain, opr any other sx a this time.     Review of Systems   Constitutional: Negative for chills, fever and malaise/fatigue.   HENT:  Negative for congestion.    Eyes:  Negative for blurred vision.   Cardiovascular:  Negative for chest pain, dyspnea on exertion, irregular heartbeat, leg swelling, near-syncope, orthopnea, palpitations and syncope.   Respiratory:  Negative for cough, shortness of breath and wheezing.    Gastrointestinal:  Negative for bloating, abdominal pain, constipation, diarrhea, hematemesis, hematochezia, nausea and vomiting.   Genitourinary:  Negative for bladder incontinence, dysuria, frequency, hesitancy and urgency.   Neurological:  Negative for dizziness, headaches, numbness and weakness.        Brain fog    Psychiatric/Behavioral:  Negative for altered mental status.        Objective:   Vitals: Blood pressure 145/66, pulse 55, temperature 98 °F (36.7 °C), temperature source Oral, resp. rate 18, weight 104 kg (229 lb 12.8 oz), SpO2 98%., Body mass index is 29.5 kg/m².,     Systolic (24hrs), Av , Min:106 , Max:148     Diastolic (24hrs), Av, Min:53, Max:67        Intake/Output Summary (Last 24 hours) at 4/10/2024 1227  Last data  filed at 4/10/2024 0700  Gross per 24 hour   Intake 233 ml   Output 850 ml   Net -617 ml     Wt Readings from Last 3 Encounters:   04/10/24 104 kg (229 lb 12.8 oz)   02/27/24 105 kg (231 lb)   02/22/24 105 kg (231 lb)       Telemetry Review: No significant arrhythmias seen on telemetry review. Not on tele     EKG personally reviewed by Reena Esquivel MD.     Physical Exam  Constitutional:       General: He is not in acute distress.     Appearance: Normal appearance. He is not ill-appearing, toxic-appearing or diaphoretic.   HENT:      Head: Normocephalic and atraumatic.      Nose: Nose normal.      Mouth/Throat:      Mouth: Mucous membranes are moist.      Pharynx: Oropharynx is clear. No oropharyngeal exudate or posterior oropharyngeal erythema.   Eyes:      General:         Right eye: No discharge.         Left eye: No discharge.      Extraocular Movements: Extraocular movements intact.      Conjunctiva/sclera: Conjunctivae normal.      Pupils: Pupils are equal, round, and reactive to light.   Cardiovascular:      Rate and Rhythm: Regular rhythm. Bradycardia present.      Pulses: Normal pulses.      Heart sounds: Murmur heard.      No friction rub. No gallop.   Pulmonary:      Effort: Pulmonary effort is normal. No respiratory distress.      Breath sounds: Normal breath sounds. No wheezing or rhonchi.   Chest:      Chest wall: No tenderness.   Abdominal:      General: Bowel sounds are normal. There is no distension.      Palpations: Abdomen is soft.      Tenderness: There is no abdominal tenderness. There is no right CVA tenderness or left CVA tenderness.   Musculoskeletal:         General: No tenderness. Normal range of motion.      Cervical back: Normal range of motion.      Right lower leg: No edema.      Left lower leg: No edema.   Skin:     General: Skin is warm.   Neurological:      General: No focal deficit present.      Mental Status: He is alert and oriented to person, place, and time. Mental status is at  baseline.      Cranial Nerves: No cranial nerve deficit.      Sensory: No sensory deficit.      Motor: No weakness.      Coordination: Coordination normal.      Gait: Gait normal.   Psychiatric:         Mood and Affect: Mood normal.         Behavior: Behavior normal.         Thought Content: Thought content normal.         LABORATORY RESULTS      CBC with diff:   Results from last 7 days   Lab Units 04/10/24  0827 04/09/24  2100 04/09/24  0347 04/09/24  0300 04/08/24  1900   WBC Thousand/uL  --   --   --  6.10 8.48   HEMOGLOBIN g/dL 9.0* 8.7* 8.2* 7.4* 9.1*   HEMATOCRIT %  --   --  29.3* 27.0* 33.1*   MCV fL  --   --   --  72* 72*   PLATELETS Thousands/uL  --   --   --  244 314   RBC Million/uL  --   --   --  3.75* 4.58   MCH pg  --   --   --  19.7* 19.9*   MCHC g/dL  --   --   --  27.4* 27.5*   RDW %  --   --   --  19.7* 19.8*   MPV fL  --   --   --  10.6 10.4   NRBC AUTO /100 WBCs  --   --   --  0 0       CMP:  Results from last 7 days   Lab Units 04/10/24  0943 04/09/24 0300 04/08/24  1900   POTASSIUM mmol/L 3.9 3.6 4.0   CHLORIDE mmol/L 105 107 103   CO2 mmol/L 28 27 29   BUN mg/dL 27* 33* 37*   CREATININE mg/dL 1.61* 1.67* 2.04*   CALCIUM mg/dL 9.1 8.3* 9.2   AST U/L  --   --  16   ALT U/L  --   --  12   ALK PHOS U/L  --   --  37   EGFR ml/min/1.73sq m 39 37 29       BMP:  Results from last 7 days   Lab Units 04/10/24  0943 04/09/24 0300 04/08/24  1900   POTASSIUM mmol/L 3.9 3.6 4.0   CHLORIDE mmol/L 105 107 103   CO2 mmol/L 28 27 29   BUN mg/dL 27* 33* 37*   CREATININE mg/dL 1.61* 1.67* 2.04*   CALCIUM mg/dL 9.1 8.3* 9.2       Lab Results   Component Value Date    CREATININE 1.61 (H) 04/10/2024    CREATININE 1.67 (H) 04/09/2024    CREATININE 2.04 (H) 04/08/2024       Lab Results   Component Value Date    NTBNP 420 02/16/2022           Results from last 7 days   Lab Units 04/10/24  0943 04/09/24  0300   MAGNESIUM mg/dL 2.1 2.1          Results from last 7 days   Lab Units 04/08/24  2329   HEMOGLOBIN A1C %  "7.2*                    Lipid Profile:   No results found for: \"CHOL\"  Lab Results   Component Value Date    HDL 49 10/04/2023    HDL 39 (L) 03/09/2023    HDL 46 07/19/2022     Lab Results   Component Value Date    LDLCALC 51 10/04/2023    LDLCALC 56 03/09/2023    LDLCALC 62 07/19/2022     Lab Results   Component Value Date    TRIG 93 10/04/2023    TRIG 93 03/09/2023    TRIG 115 07/19/2022       Cardiac testing:   No results found for this or any previous visit.    No results found for this or any previous visit.    No results found for this or any previous visit.    No valid procedures specified.  No results found for this or any previous visit.      Meds/Allergies   all current active meds have been reviewed  Medications Prior to Admission   Medication    acetaminophen (TYLENOL) 325 mg tablet    amLODIPine (NORVASC) 10 mg tablet    aspirin 81 mg chewable tablet    atorvastatin (LIPITOR) 40 mg tablet    Blood Glucose Monitoring Suppl (OneTouch Verio Reflect) w/Device KIT    cloNIDine (CATAPRES) 0.1 mg tablet    clopidogrel (PLAVIX) 75 mg tablet    Droplet Pen Needles 32G X 4 MM MISC    Elastic Bandages & Supports (Medical Compression Stockings) MISC    Empagliflozin 25 MG TABS    fenofibrate 160 MG tablet    ferrous sulfate 324 (65 Fe) mg    gabapentin (NEURONTIN) 600 MG tablet    glimepiride (AMARYL) 2 mg tablet    glucose blood (OneTouch Verio) test strip    insulin glargine (LANTUS) 100 units/mL subcutaneous injection    isosorbide mononitrate (IMDUR) 60 mg 24 hr tablet    metFORMIN (GLUCOPHAGE) 500 mg tablet    metoprolol tartrate (LOPRESSOR) 50 mg tablet    Multiple Vitamins-Minerals (MULTIVITAMIN MEN 50+ PO)    nitroglycerin (NITROSTAT) 0.4 mg SL tablet    Omega-3 Fatty Acids (fish oil) 1,000 mg    ondansetron (ZOFRAN) 4 mg tablet    OneTouch Delica Lancets 33G MISC    pantoprazole (PROTONIX) 40 mg tablet    polyethylene glycol (GOLYTELY) 4000 mL solution    ranolazine (RANEXA) 500 mg 12 hr tablet    " sitaGLIPtin (JANUVIA) 100 mg tablet    torsemide (DEMADEX) 20 mg tablet       pantoprazole (PROTONIX) 80 mg in sodium chloride 0.9 % 100 mL infusion, 8 mg/hr, Last Rate: 8 mg/hr (04/10/24 0635)        Assessment:  Principal Problem:    Shortness of breath  Active Problems:    Primary hypertension    Hx of CABG    Type 2 diabetes mellitus with diabetic polyneuropathy, with long-term current use of insulin (HCC)    Chronic HFpEF/Moderate pHTN (HFpEF) (HCC)    Chronic kidney disease-mineral and bone disorder    Stable angina pectoris    Symptomatic anemia      Counseling / Coordination of Care  Total floor / unit time spent today 20 minutes.  Greater than 50% of total time was spent with the patient and / or family counseling and / or coordination of care.      ** Please Note: Dragon 360 Dictation voice to text software may have been used in the creation of this document. **

## 2024-04-10 NOTE — ANESTHESIA PREPROCEDURE EVALUATION
Procedure:  EGD    Relevant Problems   CARDIO   (+) Coronary artery disease involving native coronary artery of native heart without angina pectoris   (+) Frequent PVCs   (+) Hypertensive urgency   (+) Mild aortic stenosis   (+) Mixed hyperlipidemia   (+) Primary hypertension   (+) Stable angina pectoris   (+) Type 2 diabetes mellitus with diabetic peripheral angiopathy without gangrene, with long-term current use of insulin (HCC)      ENDO   (+) Type 2 diabetes mellitus with diabetic peripheral angiopathy without gangrene, with long-term current use of insulin (HCC)   (+) Type 2 diabetes mellitus with diabetic polyneuropathy, with long-term current use of insulin (HCC)      GI/HEPATIC   (+) Duodenal ulcer   (+) Multiple gastric ulcers      /RENAL   (+) Acute kidney injury superimposed on chronic kidney disease  (HCC)   (+) Chronic kidney disease-mineral and bone disorder      HEMATOLOGY   (+) Acute blood loss anemia   (+) Iron deficiency anemia due to chronic blood loss   (+) Symptomatic anemia      NEURO/PSYCH   (+) Stable angina pectoris      PULMONARY   (+) Shortness of breath      Denies recent fever, cough or other symptom of upper respiratory tract infection.    Confirmed NPO appropriate    Physical Exam    Airway    Mallampati score: III  TM Distance: >3 FB  Neck ROM: full     Dental        Cardiovascular      Pulmonary      Other Findings        6/17/23 TTE: EF 50%. Mild global hypokinesis. Grade II diastolic dysfunction. Normal right ventricular systolic function. Mild to moderate AS. Moderate to severe pulmonary hypertension.    Anesthesia Plan  ASA Score- 3     Anesthesia Type- IV sedation with anesthesia with ASA Monitors.         Additional Monitors:     Airway Plan:     Comment: I discussed the risks and benefits of IV sedation anesthesia including the possibility of the need to convert to general anesthesia and the potential risk of awareness.       Plan Factors-Exercise tolerance (METS): >4  METS.Exercise comment: Able to climb two flights of stairs at home without cardiopulmonary limitation.    Chart reviewed.   Existing labs reviewed.     Patient is not a current smoker.  Patient did not smoke on day of surgery.            Induction- intravenous.    Postoperative Plan-     Informed Consent- Anesthetic plan and risks discussed with patient.

## 2024-04-10 NOTE — PROGRESS NOTES
[facility]  Progress Note  Name: Thomas Horne I  MRN: [id number]  Unit/Bed#: -01 I Date of Admission: 4/8/2024   Date of Service: 4/10/2024 I Hospital Day: 1    Assessment/Plan   Symptomatic anemia  Assessment & Plan  Presented for two days of dark stools, though is on iron as op.  Hgb stable 9.1 the drop down to 7.4, increased to 8.2 without any intervention.  Improved to 9 this morning.  Continue IV Protonix  ASA, plavix on hold  NPO  GI following.  Plans for EGD today.    Stable angina pectoris  Assessment & Plan  Ongoing chest pain since November.  Chest pain improves when resting.   This morning patient reports improvement in his exertional chest pain and shortness of breath after getting a low-dose of Lasix yesterday.  Maintained on ranexa, imdur.  Can consider increasing  Cardio following    Chronic kidney disease-mineral and bone disorder  Assessment & Plan  Baseline 1.6 to 1.8.  current creatinine 2 on admission.   Torsemide was held overnight.  Creatinine improved down to 1.6 today.  BMP in AM    Chronic HFpEF/Moderate pHTN (HFpEF) ([facility])  Assessment & Plan  Examines euvolemic   Last echocardiogram 6/2023: 50%, grade II diastolic dysfunction.  Patient complains of significant dyspnea on exertion which currently is most likely from anemia rather than heart failure.  Continue home medications.  Torsemid 20 mg QD - on hold for now    Type 2 diabetes mellitus with diabetic polyneuropathy, with long-term current use of insulin ([facility])  Assessment & Plan  Lab Results   Component Value Date    HGBA1C 7.2 (H) 04/08/2024     Continue Lantus 15 units with sliding scale  Accucheck, SSI    Hx of CABG  Assessment & Plan  ASA, plavix on hold  Continue statin  Metoprolol on hold because of bradycardia  Patient evaluated by cardiology.    Primary hypertension  Assessment & Plan  BP stable  Torsemide on hold for now because of increasing creatinine on admission.  Continue clonidine,  amlodipine   Metoprolol on hold because of bradycardia             VTE Pharmacologic Prophylaxis:   Pharmacologic: Pharmacologic VTE Prophylaxis contraindicated due to concern for GI bleed  Mechanical VTE Prophylaxis in Place: Yes    Patient Centered Rounds: I have performed bedside rounds with nursing staff today.    Discussions with Specialists or Other Care Team Provider: cardio GI    Education and Discussions with Family / Patient: pt    Time Spent for Care: 20 minutes.  More than 50% of total time spent on counseling and coordination of care as described above.    Current Length of Stay: 1 day(s)    Current Patient Status: Inpatient   Certification Statement: The patient will continue to require additional inpatient hospital stay due to above    Discharge Plan: tomorrow    Code Status: Level 3 - DNAR and DNI      Subjective:   Pt seen and examined by me in morning.  Reports improvement in his exertional shortness of breath and chest pain after getting Lasix yesterday.  No other events overnight.    Objective:     Vitals:   Temp (24hrs), Av.4 °F (36.3 °C), Min:96.7 °F (35.9 °C), Max:98 °F (36.7 °C)    Temp:  [96.7 °F (35.9 °C)-98 °F (36.7 °C)] 96.7 °F (35.9 °C)  HR:  [49-58] 52  Resp:  [16-18] 18  BP: (106-145)/(53-66) 133/58  SpO2:  [94 %-98 %] 97 %  Body mass index is 29.5 kg/m².     Input and Output Summary (last 24 hours):       Intake/Output Summary (Last 24 hours) at 4/10/2024 1453  Last data filed at 4/10/2024 1413  Gross per 24 hour   Intake 333 ml   Output --   Net 333 ml       Physical Exam:     Physical Exam  Constitutional: Pt appears comfortable. Not in any acute distress.  Cardiovascular: bradycardia, regular rhythm, normal heart sounds.  No murmur heard.  Pulmonary/Chest: Effort normal, air entry b/l equal. No respiratory distress. Pt has no wheezes or crackles.   Abdominal: Soft. Non-distended, Non-tender. Bowel sounds are normal.   Musculoskeletal: Normal range of motion.   Neurological:  awake, alert. Moving all extremities spontaneously.  Psychiatric: normal mood and affect.        Additional Data:     Labs:    Results from last 7 days   Lab Units 04/10/24  0827 04/09/24  2100 04/09/24  0347 04/09/24  0300   WBC Thousand/uL  --   --   --  6.10   HEMOGLOBIN g/dL 9.0*   < > 8.2* 7.4*   HEMATOCRIT %  --   --  29.3* 27.0*   PLATELETS Thousands/uL  --   --   --  244   SEGS PCT %  --   --   --  57   LYMPHO PCT %  --   --   --  31   MONO PCT %  --   --   --  8   EOS PCT %  --   --   --  3    < > = values in this interval not displayed.     Results from last 7 days   Lab Units 04/10/24  0943 04/09/24  0300 04/08/24  1900   SODIUM mmol/L 139   < > 138   POTASSIUM mmol/L 3.9   < > 4.0   CHLORIDE mmol/L 105   < > 103   CO2 mmol/L 28   < > 29   BUN mg/dL 27*   < > 37*   CREATININE mg/dL 1.61*   < > 2.04*   ANION GAP mmol/L 6   < > 6   CALCIUM mg/dL 9.1   < > 9.2   ALBUMIN g/dL  --   --  4.2   TOTAL BILIRUBIN mg/dL  --   --  0.34   ALK PHOS U/L  --   --  37   ALT U/L  --   --  12   AST U/L  --   --  16   GLUCOSE RANDOM mg/dL 138   < > 137    < > = values in this interval not displayed.         Results from last 7 days   Lab Units 04/10/24  1053 04/10/24  0541 04/10/24  0006 04/09/24  1737 04/09/24  1144 04/09/24  0537 04/09/24  0246 04/09/24  0222 04/09/24  0043   POC GLUCOSE mg/dl 136 129 155* 131 103 81 94 74 85     Results from last 7 days   Lab Units 04/08/24  2329   HEMOGLOBIN A1C % 7.2*               * I Have Reviewed All Lab Data Listed Above.  * Additional Pertinent Lab Tests Reviewed: All Labs For Current Hospital Admission Reviewed    Imaging:    Imaging Reports Reviewed Today Include:   Imaging Personally Reviewed by Myself Includes:      Recent Cultures (last 7 days):           Last 24 Hours Medication List:   Current Facility-Administered Medications   Medication Dose Route Frequency Provider Last Rate    acetaminophen  650 mg Oral Q6H PRN APOLLO Ordaz      amLODIPine  5 mg Oral Daily Sandra  APOLLO [address]      atorvastatin  40 mg Oral QPM APOLLO Ordaz      cloNIDine  0.1 mg Oral Q12H [facility] APOLLO Ordaz      fenofibrate  145 mg Oral Daily APOLLO Ordaz      ferrous sulfate  325 mg Oral BID With Meals APOLLO Ordaz      gabapentin  400 mg Oral BID APOLLO Ordaz      insulin glargine  5 Units Subcutaneous HS APOLLO Ordaz      insulin lispro  1-6 Units Subcutaneous Q6H [facility] APOLLO Ordaz      isosorbide mononitrate  60 mg Oral Daily APOLLO Ordaz      [START ON 4/11/2024] metoprolol tartrate  25 mg Oral Q12H [facility] Reena Esquivel MD      pantoprazole (PROTONIX) 80 mg in sodium chloride 0.9 % 100 mL infusion  8 mg/hr Intravenous Continuous Duran White MD 8 mg/hr (04/10/24 0635)    ranolazine  500 mg Oral BID APOLLO Ordaz          Today, Patient Was Seen By: Jonnathan Ramirez MD    ** Please Note: Dictation voice to text software may have been used in the creation of this document. **

## 2024-04-10 NOTE — ASSESSMENT & PLAN NOTE
Examines euvolemic   Last echocardiogram 6/2023: 50%, grade II diastolic dysfunction.  Patient complains of significant dyspnea on exertion which currently is most likely from anemia rather than heart failure.  Continue home medications.  Torsemid 20 mg QD - on hold for now

## 2024-04-10 NOTE — PLAN OF CARE
Problem: INFECTION - ADULT  Goal: Absence or prevention of progression during hospitalization  Description: INTERVENTIONS:  - Assess and monitor for signs and symptoms of infection  - Monitor lab/diagnostic results  - Monitor all insertion sites, i.e. indwelling lines, tubes, and drains  - Monitor endotracheal if appropriate and nasal secretions for changes in amount and color  - [place] appropriate cooling/warming therapies per order  - Administer medications as ordered  - Instruct and encourage patient and family to use good hand hygiene technique  - Identify and instruct in appropriate isolation precautions for identified infection/condition  Outcome: Progressing     Problem: PAIN - ADULT  Goal: Verbalizes/displays adequate comfort level or baseline comfort level  Description: Interventions:  - Encourage patient to monitor pain and request assistance  - Assess pain using appropriate pain scale  - Administer analgesics based on type and severity of pain and evaluate response  - Implement non-pharmacological measures as appropriate and evaluate response  - Consider cultural and social influences on pain and pain management  - Notify physician/advanced practitioner if interventions unsuccessful or patient reports new pain  Outcome: Progressing

## 2024-04-10 NOTE — ANESTHESIA POSTPROCEDURE EVALUATION
Post-Op Assessment Note    CV Status:  Stable  Pain Score: 0    Pain management: adequate       Mental Status:  Alert and awake   Hydration Status:  Euvolemic   PONV Controlled:  Controlled   Airway Patency:  Patent     Post Op Vitals Reviewed: Yes    No anethesia notable event occurred.    Staff: Anesthesiologist, CRNA               /59 (04/10/24 1414)    Temp (!) 96.7 °F (35.9 °C) (04/10/24 1414)    Pulse (!) 52 (04/10/24 1414)   Resp 18 (04/10/24 1414)    SpO2 94 % (04/10/24 1414)

## 2024-04-10 NOTE — UTILIZATION REVIEW
Continued Stay Review    SEE INITIAL REVIEW AT BOTTOM    Date: 4/10/24                          Current Patient Class: IP  Current Level of Care: Med Surg    HPI:79 y.o. male initially admitted on 4/8     Assessment/Plan: Continue IV Protonix drip, ASA and Plavix on hold, NPO, Plan for EGD today, BMP in am, continue holding torsemide, continue holding metoprolol due to bradycardia.     Vital Signs:     Date/Time Temp Pulse Resp BP MAP (mmHg) SpO2 O2 Device   04/10/24 1423 -- 52 Abnormal   18 133/58 -- 97 % None (Room air)   04/10/24 1414 96.7 °F (35.9 °C) Abnormal  52 Abnormal  18 106/59 -- 94 % None (Room air)   04/10/24 1245 96.7 °F (35.9 °C) Abnormal  51 Abnormal  16 126/59 -- 98 % None (Room air)   04/10/24 0800 -- -- -- -- -- 98 % None (Room air)   04/10/24 0700 98 °F (36.7 °C) 55 18 145/66 95 98 % None (Room air)   04/09/24 2300 98 °F (36.7 °C) 58 18 106/53 77 98 % None (Room air)   04/09/24 2159 -- 49 Abnormal  -- 115/55 -- -- --   04/09/24 1452 97.7 °F (36.5 °C) -- 16 148/67 96 97 % None (Room air)   04/09/24 1245 -- 50 Abnormal  -- 117/59 84 -- --   04/09/24 0758 -- -- -- -- -- -- None (Room air)   04/09/24 0755 97.6 °F (36.4 °C) 48 Abnormal  16 122/60 -- 98 % None (Room air)       Pertinent Labs/Diagnostic Results:     4/10 EGD:  IMPRESSION:  The esophagus, stomach and duodenum appeared normal.      Results from last 7 days   Lab Units 04/10/24  0827 04/09/24  2100 04/09/24  0347 04/09/24  0300 04/08/24  1900   WBC Thousand/uL  --   --   --  6.10 8.48   HEMOGLOBIN g/dL 9.0* 8.7* 8.2* 7.4* 9.1*   HEMATOCRIT %  --   --  29.3* 27.0* 33.1*   PLATELETS Thousands/uL  --   --   --  244 314   TOTAL NEUT ABS Thousands/µL  --   --   --  3.50 5.89         Results from last 7 days   Lab Units 04/10/24  0943 04/09/24  0300 04/08/24  1900   SODIUM mmol/L 139 139 138   POTASSIUM mmol/L 3.9 3.6 4.0   CHLORIDE mmol/L 105 107 103   CO2 mmol/L 28 27 29   ANION GAP mmol/L 6 5 6   BUN mg/dL 27* 33* 37*   CREATININE mg/dL 1.61*  1.67* 2.04*   EGFR ml/min/1.73sq m 39 37 29   CALCIUM mg/dL 9.1 8.3* 9.2   MAGNESIUM mg/dL 2.1 2.1  --      Results from last 7 days   Lab Units 04/08/24  1900   AST U/L 16   ALT U/L 12   ALK PHOS U/L 37   TOTAL PROTEIN g/dL 7.8   ALBUMIN g/dL 4.2   TOTAL BILIRUBIN mg/dL 0.34     Results from last 7 days   Lab Units 04/10/24  1053 04/10/24  0541 04/10/24  0006 04/09/24  1737 04/09/24  1144 04/09/24  0537 04/09/24  0246 04/09/24  0222 04/09/24  0043   POC GLUCOSE mg/dl 136 129 155* 131 103 81 94 74 85     Results from last 7 days   Lab Units 04/10/24  0943 04/09/24  0300 04/08/24  1900   GLUCOSE RANDOM mg/dL 138 87 137         Results from last 7 days   Lab Units 04/08/24  2329   HEMOGLOBIN A1C % 7.2*   EAG mg/dl 160           Results from last 7 days   Lab Units 04/08/24  2329 04/08/24  2105 04/08/24  1900   HS TNI 0HR ng/L  --   --  29   HS TNI 2HR ng/L  --  19  --    HSTNI D2 ng/L  --  -10  --    HS TNI 4HR ng/L 27  --   --    HSTNI D4 ng/L -2  --   --            Results from last 7 days   Lab Units 04/08/24  1900   BNP pg/mL 729*     Results from last 7 days   Lab Units 04/08/24  1900   FERRITIN ng/mL 10*   IRON SATURATION % 5*   IRON ug/dL 24*   TIBC ug/dL 454*         Medications:   Scheduled Medications:  amLODIPine, 5 mg, Oral, Daily  atorvastatin, 40 mg, Oral, QPM  cloNIDine, 0.1 mg, Oral, Q12H CONSTANTINE  fenofibrate, 145 mg, Oral, Daily  ferrous sulfate, 325 mg, Oral, BID With Meals  gabapentin, 400 mg, Oral, BID  insulin glargine, 5 Units, Subcutaneous, HS  insulin lispro, 1-6 Units, Subcutaneous, Q6H CONSTANTINE  isosorbide mononitrate, 60 mg, Oral, Daily  [START ON 4/11/2024] metoprolol tartrate, 25 mg, Oral, Q12H CONSTANTINE  ranolazine, 500 mg, Oral, BID      Continuous IV Infusions:  pantoprazole (PROTONIX) 80 mg in sodium chloride 0.9 % 100 mL infusion, 8 mg/hr, Intravenous, Continuous      PRN Meds:  acetaminophen, 650 mg, Oral, Q6H PRN        Discharge Plan: TBD    Network Utilization Review Department  ATTENTION:  Please call with any questions or concerns to [phone] and carefully listen to the prompts so that you are directed to the right person. All voicemails are confidential.   For Discharge needs, contact Care Management DC Support Team at [phone] opt. 2  Send all requests for admission clinical reviews, approved or denied determinations and any other requests to dedicated fax number below belonging to the campus where the patient is receiving treatment. List of dedicated fax numbers for the Facilities:  FACILITY NAME UR FAX NUMBER   ADMISSION DENIALS (Administrative/Medical Necessity) [phone]   DISCHARGE SUPPORT TEAM (NETWORK) [phone]   PARENT CHILD HEALTH (Maternity/NICU/Pediatrics) [phone]   [facility] [phone]   General acute hospital [phone]   [facility] [phone]   [facility] [phone]   [facility] [phone]   [facility] [phone]   [facility] [phone]   [facility] [phone]   [facility] [phone]   [facility] [phone]   [facility] [phone]   [facility] [phone]

## 2024-04-10 NOTE — ASSESSMENT & PLAN NOTE
Ongoing chest pain since November.  Chest pain improves when resting.   This morning patient reports improvement in his exertional chest pain and shortness of breath after getting a low-dose of Lasix yesterday.  Maintained on ranexa, imdur.  Can consider increasing  Cardio following

## 2024-04-11 VITALS
BODY MASS INDEX: 29.35 KG/M2 | OXYGEN SATURATION: 97 % | WEIGHT: 228.6 LBS | DIASTOLIC BLOOD PRESSURE: 60 MMHG | HEART RATE: 58 BPM | TEMPERATURE: 98 F | SYSTOLIC BLOOD PRESSURE: 118 MMHG | RESPIRATION RATE: 17 BRPM

## 2024-04-11 LAB
ANION GAP SERPL CALCULATED.3IONS-SCNC: 8 MMOL/L (ref 4–13)
BASOPHILS # BLD AUTO: 0.03 THOUSANDS/ÂΜL (ref 0–0.1)
BASOPHILS NFR BLD AUTO: 0 % (ref 0–1)
BUN SERPL-MCNC: 25 MG/DL (ref 5–25)
CALCIUM SERPL-MCNC: 9.2 MG/DL (ref 8.4–10.2)
CHLORIDE SERPL-SCNC: 105 MMOL/L (ref 96–108)
CO2 SERPL-SCNC: 24 MMOL/L (ref 21–32)
CREAT SERPL-MCNC: 1.51 MG/DL (ref 0.6–1.3)
EOSINOPHIL # BLD AUTO: 0.17 THOUSAND/ÂΜL (ref 0–0.61)
EOSINOPHIL NFR BLD AUTO: 3 % (ref 0–6)
ERYTHROCYTE [DISTWIDTH] IN BLOOD BY AUTOMATED COUNT: 20.7 % (ref 11.6–15.1)
GFR SERPL CREATININE-BSD FRML MDRD: 42 ML/MIN/1.73SQ M
GLUCOSE SERPL-MCNC: 114 MG/DL (ref 65–140)
GLUCOSE SERPL-MCNC: 140 MG/DL (ref 65–140)
GLUCOSE SERPL-MCNC: 164 MG/DL (ref 65–140)
GLUCOSE SERPL-MCNC: 188 MG/DL (ref 65–140)
HCT VFR BLD AUTO: 32.8 % (ref 36.5–49.3)
HGB BLD-MCNC: 9.1 G/DL (ref 12–17)
IMM GRANULOCYTES # BLD AUTO: 0.02 THOUSAND/UL (ref 0–0.2)
IMM GRANULOCYTES NFR BLD AUTO: 0 % (ref 0–2)
LYMPHOCYTES # BLD AUTO: 1.32 THOUSANDS/ÂΜL (ref 0.6–4.47)
LYMPHOCYTES NFR BLD AUTO: 20 % (ref 14–44)
MCH RBC QN AUTO: 20 PG (ref 26.8–34.3)
MCHC RBC AUTO-ENTMCNC: 27.7 G/DL (ref 31.4–37.4)
MCV RBC AUTO: 72 FL (ref 82–98)
MONOCYTES # BLD AUTO: 0.52 THOUSAND/ÂΜL (ref 0.17–1.22)
MONOCYTES NFR BLD AUTO: 8 % (ref 4–12)
NEUTROPHILS # BLD AUTO: 4.71 THOUSANDS/ÂΜL (ref 1.85–7.62)
NEUTS SEG NFR BLD AUTO: 69 % (ref 43–75)
NRBC BLD AUTO-RTO: 0 /100 WBCS
PLATELET # BLD AUTO: 267 THOUSANDS/UL (ref 149–390)
PMV BLD AUTO: 10.5 FL (ref 8.9–12.7)
POTASSIUM SERPL-SCNC: 3.9 MMOL/L (ref 3.5–5.3)
RBC # BLD AUTO: 4.56 MILLION/UL (ref 3.88–5.62)
SODIUM SERPL-SCNC: 137 MMOL/L (ref 135–147)
WBC # BLD AUTO: 6.77 THOUSAND/UL (ref 4.31–10.16)

## 2024-04-11 PROCEDURE — C9113 INJ PANTOPRAZOLE SODIUM, VIA: HCPCS | Performed by: INTERNAL MEDICINE

## 2024-04-11 PROCEDURE — 99239 HOSP IP/OBS DSCHRG MGMT >30: CPT | Performed by: INTERNAL MEDICINE

## 2024-04-11 PROCEDURE — 99232 SBSQ HOSP IP/OBS MODERATE 35: CPT | Performed by: INTERNAL MEDICINE

## 2024-04-11 PROCEDURE — 80048 BASIC METABOLIC PNL TOTAL CA: CPT | Performed by: INTERNAL MEDICINE

## 2024-04-11 PROCEDURE — 82948 REAGENT STRIP/BLOOD GLUCOSE: CPT

## 2024-04-11 PROCEDURE — 85025 COMPLETE CBC W/AUTO DIFF WBC: CPT | Performed by: INTERNAL MEDICINE

## 2024-04-11 RX ORDER — TORSEMIDE 20 MG/1
20 TABLET ORAL DAILY
Status: DISCONTINUED | OUTPATIENT
Start: 2024-04-11 | End: 2024-04-11 | Stop reason: HOSPADM

## 2024-04-11 RX ORDER — METOPROLOL TARTRATE 50 MG/1
25 TABLET, FILM COATED ORAL EVERY 12 HOURS SCHEDULED
Qty: 30 TABLET | Refills: 0 | Status: SHIPPED | OUTPATIENT
Start: 2024-04-11

## 2024-04-11 RX ADMIN — FERROUS SULFATE TAB 325 MG (65 MG ELEMENTAL FE) 325 MG: 325 (65 FE) TAB at 09:01

## 2024-04-11 RX ADMIN — FENOFIBRATE 145 MG: 145 TABLET ORAL at 08:57

## 2024-04-11 RX ADMIN — TORSEMIDE 20 MG: 20 TABLET ORAL at 12:46

## 2024-04-11 RX ADMIN — CLONIDINE HYDROCHLORIDE 0.1 MG: 0.1 TABLET ORAL at 08:57

## 2024-04-11 RX ADMIN — METOPROLOL TARTRATE 25 MG: 25 TABLET, FILM COATED ORAL at 08:57

## 2024-04-11 RX ADMIN — GABAPENTIN 400 MG: 400 CAPSULE ORAL at 08:57

## 2024-04-11 RX ADMIN — AMLODIPINE BESYLATE 5 MG: 5 TABLET ORAL at 08:57

## 2024-04-11 RX ADMIN — RANOLAZINE 500 MG: 500 TABLET, FILM COATED, EXTENDED RELEASE ORAL at 08:57

## 2024-04-11 RX ADMIN — INSULIN LISPRO 1 UNITS: 100 INJECTION, SOLUTION INTRAVENOUS; SUBCUTANEOUS at 12:46

## 2024-04-11 RX ADMIN — ISOSORBIDE MONONITRATE 60 MG: 60 TABLET, EXTENDED RELEASE ORAL at 08:57

## 2024-04-11 RX ADMIN — SODIUM CHLORIDE 8 MG/HR: 9 INJECTION, SOLUTION INTRAVENOUS at 03:13

## 2024-04-11 NOTE — DISCHARGE SUMMARY
[facility]  Discharge- Thomas Horne 1945, 79 y.o. male MRN: [id number]  Unit/Bed#: S -Connor Encounter: [id number]  Primary Care Provider: Frank Lombardi, DO   Date and time admitted to hospital: 4/8/2024  7:27 PM    Symptomatic anemia  Assessment & Plan  Presented for two days of dark stools, though is on iron as op.  Hgb dropped down to 7.4, increased to 8.2 without any intervention.  Improved to 9 this morning.  EGD without any source of bleeding.  Hemoglobin stable and trending up now.  Continue aspirin.  Hold Plavix on discharge.  Repeat CBC in 1 week after discharge    Stable angina pectoris  Assessment & Plan  Complaint of exertional chest pain shortness of breath on admission.  Improved after getting a dose of IV Lasix.  Now better.  Resume home regimen on discharge except for Plavix.    Chronic kidney disease-mineral and bone disorder  Assessment & Plan  Creatinine back to baseline now.    Chronic HFpEF/Moderate pHTN (HFpEF) ([facility])  Assessment & Plan  Examines euvolemic   Last echocardiogram 6/2023: 50%, grade II diastolic dysfunction.  Patient complained complains of significant dyspnea on exertion which currently is most likely from anemia rather than heart failure.  Improved significantly after dose of IV Lasix.  Resume torsemide on discharge.    Type 2 diabetes mellitus with diabetic polyneuropathy, with long-term current use of insulin ([facility])  Assessment & Plan  Lab Results   Component Value Date    HGBA1C 7.2 (H) 04/08/2024     Resume home regimen on discharge    Hx of CABG  Assessment & Plan  ASA, plavix held on admission. Per cardiology resume aspirin.  Hold Plavix.  Continue statin.  Metoprolol dose decreased to half.    Primary hypertension  Assessment & Plan  BP stable  Continue clonidine, amlodipine   Metoprolol dose decreased to 25 twice daily        Discharging Physician / Practitioner: Jonnathan Ramirez MD  PCP: Frank Lombardi, DO  Admission Date:   Admission  Orders (From admission, onward)       Ordered        04/09/24 1422  INPATIENT ADMISSION  Once            04/08/24 2048  Place in Observation  Once                          Discharge Date: 04/11/24    Medical Problems       Resolved Problems  Date Reviewed: 4/9/2024   None         Consultations During Hospital Stay:  Cardio, gi        Reason for Admission: Symptomatic anemia    Hospital Course:     Thomas Horne is a 79 y.o. male patient who originally presented to the hospital on 4/8/2024 due to symptomatic anemia.  Underwent EGD without any clear source of bleeding.  Hemoglobin started trending up without any significant interventions.  Possibly from occult GI bleed.  Patient was aspirin and Plavix which were held on admission.  To be discharged only on aspirin.  Plavix discontinued per cardiology recommendations.      Please see above list of diagnoses and related plan for additional information.     Condition at Discharge: good     Discharge Day Visit / Exam:     Subjective:  Pt seen and examined by me in morning.  Denies any complaints.  No chest pain or shortness of breath.    Vitals: Blood Pressure: 118/60 (04/11/24 1514)  Pulse: 58 (04/11/24 1514)  Temperature: 98 °F (36.7 °C) (04/11/24 1514)  Temp Source: Oral (04/11/24 1514)  Respirations: 17 (04/11/24 1514)  Weight - Scale: 104 kg (228 lb 9.6 oz) (04/11/24 0508)  SpO2: 97 % (04/11/24 1514)    Exam:   Physical Exam    Constitutional: Pt appears comfortable. Not in any acute distress.  Cardiovascular: Normal rate, regular rhythm, normal heart sounds.  No murmur heard.  Pulmonary/Chest: Effort normal, air entry b/l equal. No respiratory distress. Pt has no wheezes or crackles.   Abdominal: Soft. Non-distended, Non-tender. Bowel sounds are normal.   Musculoskeletal: Normal range of motion.   Neurological: awake, alert. Moving all extremities spontaneously.  Psychiatric: normal mood and affect.      Discussion with Family: pt    Discharge  instructions/Information to patient and family:   See after visit summary for information provided to patient and family.      Provisions for Follow-Up Care:  See after visit summary for information related to follow-up care and any pertinent home health orders.      Disposition:     Home    For Discharges to [facility]:   Not Applicable to this Patient - Not Applicable to this Patient    Planned Readmission: no     Discharge Statement:  I spent 40 minutes discharging the patient. This time was spent on the day of discharge. I had direct contact with the patient on the day of discharge. Greater than 50% of the total time was spent examining patient, answering all patient questions, arranging and discussing plan of care with patient as well as directly providing post-discharge instructions.  Additional time then spent on discharge activities.    Discharge Medications:  See after visit summary for reconciled discharge medications provided to patient and family.      ** Please Note: This note has been constructed using a voice recognition system **

## 2024-04-11 NOTE — ASSESSMENT & PLAN NOTE
ASA, plavix held on admission. Per cardiology resume aspirin.  Hold Plavix.  Continue statin.  Metoprolol dose decreased to half.

## 2024-04-11 NOTE — ASSESSMENT & PLAN NOTE
Presented for two days of dark stools, though is on iron as op.  Hgb dropped down to 7.4, increased to 8.2 without any intervention.  Improved to 9 this morning.  EGD without any source of bleeding.  Hemoglobin stable and trending up now.  Continue aspirin.  Hold Plavix on discharge.  Repeat CBC in 1 week after discharge

## 2024-04-11 NOTE — PROGRESS NOTES
General Cardiology   Progress Note -  [facility]   Thomas Horne 79 y.o. male MRN: [id number]    Unit/Bed#: S -01 Encounter: [id number]    Assessment/ Plan  Chest Pain  Exertional CP and OQUENDO   Progressively worsened since stool changes, unable to walk same distances as prior   Has chronic stable angina hx, CAD   Initial presentation Afebrile, HR 56, 126/59, 99% RA   CBC- Hgb 9.1- baseline 8-9  CMP- BUN/Cr 37/2.04, baseline 1.6-1.8  Troponin 29, 19, 27    EKG- Sinus Bradycardia, 1st Degree AV Block  Chest Xray- Bilateral pulmonary edema with pleural effusions R>L  Significant improved s/p Lasix dose         Plan-   Would recommend continuing home regimen Ranexa and Imdur  Continue ASA      Black/Tarry Stools- Resolved   Hx of JESUS on Fe supplementation, and Internal Hemorrhoids  Previous EGD 2023- Multiple small clean based shallow superficial ulcers in the bulb  Home regimen includes ASA and Plavix which have since been on hold   Hgb baseline 9-10  Hgb 9.1     Plan-   GI Following   Plans for EGD today   Last Plavix dose 4/8  If no active bleeding recommend restarting ASA  Will hold Plavix   Would recommend IV Venofer during hospitalization      Acute on Chronic HFpEF Exacerbation   Trigger likely related to JESUS and GI Bleed   ECHO 2023 - EF 50%, mild global hypokinesis, grade 2 diastolic dysfunction, mild to moderate AS  Home regimen includes Jardiance, Lopressor, Torsemide 20mg  Cxray- Vascular congestion, volume overload   Weight 229 down from 245  I/Os- Not recorded      Plan-   Restart Home Torsemide 20mg today   Monitor Daily Weights   Monitor I/Os  Monitor BMP         Bradycardia  EKG- Sinus Bradycardia, 1st Degree AV Block  Home regimen includes Metoprolol tartrate 50mg BID   On exam viridiana to the 40-50s and symptomatic   Received morning dose of Metoprolol      Plan-    Place on telemetry   Continue Metoprolol 25mg BID            CKD III  Baseline appears to be around 1.6-1.8  Cr on admission 2.09,  has since improved 1.51     Plan-   Monitor in the setting of diuresis and restarting home torsemide   Monitor BMP         Coronary Artery Disease   Hx of CABGx3 in 2003  Stent in L Circumflex  % occluded     Plan-   ASA and Plavix on hold in setting of GI bleed   Restart ASA     Subjective  Patient was seen and examined at the bedside.  Patient was resting in the chair comfortably no overt acute distress.  Patient reports he feels well and has no complaints at the time my evaluation.  Patient reports his sensations of feeling off yesterday have significantly improved.  Patient denies any fever, chills, syncope, dizziness, lightheadedness, chest pain, shortness of breath, palpitations, abdominal pain, NVD, leg swelling, redness this time.    Review of Systems   Constitutional: Negative for chills, diaphoresis, fever, malaise/fatigue and weight gain.   HENT:  Negative for congestion.    Eyes:  Negative for blurred vision and visual disturbance.   Cardiovascular:  Negative for chest pain, dyspnea on exertion, irregular heartbeat, leg swelling, near-syncope, orthopnea, palpitations, paroxysmal nocturnal dyspnea and syncope.   Respiratory:  Negative for cough, hemoptysis, shortness of breath and wheezing.    Endocrine: Negative for polydipsia and polyuria.   Hematologic/Lymphatic: Negative for bleeding problem.   Musculoskeletal:  Negative for myalgias.   Gastrointestinal:  Negative for bloating, abdominal pain, constipation, diarrhea, heartburn, hematemesis, hematochezia, nausea and vomiting.   Genitourinary:  Negative for bladder incontinence, dysuria, frequency and urgency.   Neurological:  Negative for dizziness, focal weakness, headaches, light-headedness, numbness, seizures and weakness.   Psychiatric/Behavioral:  Negative for altered mental status.        Objective:   Vitals: Blood pressure 140/58, pulse 60, temperature 97.7 °F (36.5 °C), resp. rate 18, weight 104 kg (228 lb 9.6 oz), SpO2 97%., Body mass  index is 29.35 kg/m².,     Systolic (24hrs), Av , Min:106 , Max:140     Diastolic (24hrs), Av, Min:56, Max:59        Intake/Output Summary (Last 24 hours) at 2024 0959  Last data filed at 2024 0906  Gross per 24 hour   Intake 280 ml   Output --   Net 280 ml     Wt Readings from Last 3 Encounters:   24 104 kg (228 lb 9.6 oz)   24 105 kg (231 lb)   24 105 kg (231 lb)       Telemetry Review: No significant arrhythmias seen on telemetry review. Sinus [place], improved PVC burden     EKG personally reviewed by Reena Esquivel MD.     Physical Exam  Constitutional:       General: He is not in acute distress.     Appearance: Normal appearance. He is not ill-appearing, toxic-appearing or diaphoretic.   HENT:      Head: Normocephalic and atraumatic.      Nose: Nose normal.      Mouth/Throat:      Mouth: Mucous membranes are moist.      Pharynx: Oropharynx is clear. No oropharyngeal exudate or posterior oropharyngeal erythema.   Eyes:      General:         Right eye: No discharge.         Left eye: No discharge.      Extraocular Movements: Extraocular movements intact.      Conjunctiva/sclera: Conjunctivae normal.      Pupils: Pupils are equal, round, and reactive to light.   Cardiovascular:      Rate and Rhythm: Regular rhythm. Bradycardia present.      Pulses: Normal pulses.      Heart sounds: Murmur heard.      No friction rub. No gallop.   Pulmonary:      Effort: Pulmonary effort is normal. No respiratory distress.      Breath sounds: Normal breath sounds. No wheezing or rhonchi.   Chest:      Chest wall: No tenderness.   Abdominal:      General: Bowel sounds are normal. There is no distension.      Palpations: Abdomen is soft.      Tenderness: There is no abdominal tenderness. There is no right CVA tenderness or left CVA tenderness.   Musculoskeletal:         General: No tenderness. Normal range of motion.      Cervical back: Normal range of motion.      Right lower leg: No edema.       Left lower leg: No edema.   Skin:     General: Skin is warm.   Neurological:      General: No focal deficit present.      Mental Status: He is alert and oriented to person, place, and time. Mental status is at baseline.      Cranial Nerves: No cranial nerve deficit.      Sensory: No sensory deficit.      Motor: No weakness.      Coordination: Coordination normal.      Gait: Gait normal.   Psychiatric:         Mood and Affect: Mood normal.         Behavior: Behavior normal.         Thought Content: Thought content normal.         LABORATORY RESULTS      CBC with diff:   Results from last 7 days   Lab Units 04/11/24  0513 04/10/24  0827 04/09/24  2100 04/09/24  0347 04/09/24  0300 04/08/24  1900   WBC Thousand/uL 6.77  --   --   --  6.10 8.48   HEMOGLOBIN g/dL 9.1* 9.0* 8.7* 8.2* 7.4* 9.1*   HEMATOCRIT % 32.8*  --   --  29.3* 27.0* 33.1*   MCV fL 72*  --   --   --  72* 72*   PLATELETS Thousands/uL 267  --   --   --  244 314   RBC Million/uL 4.56  --   --   --  3.75* 4.58   MCH pg 20.0*  --   --   --  19.7* 19.9*   MCHC g/dL 27.7*  --   --   --  27.4* 27.5*   RDW % 20.7*  --   --   --  19.7* 19.8*   MPV fL 10.5  --   --   --  10.6 10.4   NRBC AUTO /100 WBCs 0  --   --   --  0 0       CMP:  Results from last 7 days   Lab Units 04/11/24  0513 04/10/24  0943 04/09/24  0300 04/08/24  1900   POTASSIUM mmol/L 3.9 3.9 3.6 4.0   CHLORIDE mmol/L 105 105 107 103   CO2 mmol/L 24 28 27 29   BUN mg/dL 25 27* 33* 37*   CREATININE mg/dL 1.51* 1.61* 1.67* 2.04*   CALCIUM mg/dL 9.2 9.1 8.3* 9.2   AST U/L  --   --   --  16   ALT U/L  --   --   --  12   ALK PHOS U/L  --   --   --  37   EGFR ml/min/1.73sq m 42 39 37 29       BMP:  Results from last 7 days   Lab Units 04/11/24  0513 04/10/24  0943 04/09/24  0300 04/08/24  1900   POTASSIUM mmol/L 3.9 3.9 3.6 4.0   CHLORIDE mmol/L 105 105 107 103   CO2 mmol/L 24 28 27 29   BUN mg/dL 25 27* 33* 37*   CREATININE mg/dL 1.51* 1.61* 1.67* 2.04*   CALCIUM mg/dL 9.2 9.1 8.3* 9.2       Lab Results  "  Component Value Date    CREATININE 1.51 (H) 04/11/2024    CREATININE 1.61 (H) 04/10/2024    CREATININE 1.67 (H) 04/09/2024       Lab Results   Component Value Date    NTBNP 420 02/16/2022           Results from last 7 days   Lab Units 04/10/24  0943 04/09/24  0300   MAGNESIUM mg/dL 2.1 2.1          Results from last 7 days   Lab Units 04/08/24  2329   HEMOGLOBIN A1C % 7.2*                    Lipid Profile:   No results found for: \"CHOL\"  Lab Results   Component Value Date    HDL 49 10/04/2023    HDL 39 (L) 03/09/2023    HDL 46 07/19/2022     Lab Results   Component Value Date    LDLCALC 51 10/04/2023    LDLCALC 56 03/09/2023    LDLCALC 62 07/19/2022     Lab Results   Component Value Date    TRIG 93 10/04/2023    TRIG 93 03/09/2023    TRIG 115 07/19/2022       Cardiac testing:   No results found for this or any previous visit.    No results found for this or any previous visit.    No results found for this or any previous visit.    No valid procedures specified.  No results found for this or any previous visit.      Meds/Allergies   all current active meds have been reviewed  Medications Prior to Admission   Medication    acetaminophen (TYLENOL) 325 mg tablet    amLODIPine (NORVASC) 10 mg tablet    aspirin 81 mg chewable tablet    atorvastatin (LIPITOR) 40 mg tablet    Blood Glucose Monitoring Suppl (OneTouch Verio Reflect) w/Device KIT    cloNIDine (CATAPRES) 0.1 mg tablet    clopidogrel (PLAVIX) 75 mg tablet    Droplet Pen Needles 32G X 4 MM MISC    Elastic Bandages & Supports (Medical Compression Stockings) MISC    Empagliflozin 25 MG TABS    fenofibrate 160 MG tablet    ferrous sulfate 324 (65 Fe) mg    gabapentin (NEURONTIN) 600 MG tablet    glimepiride (AMARYL) 2 mg tablet    glucose blood (OneTouch Verio) test strip    insulin glargine (LANTUS) 100 units/mL subcutaneous injection    isosorbide mononitrate (IMDUR) 60 mg 24 hr tablet    metFORMIN (GLUCOPHAGE) 500 mg tablet    metoprolol tartrate (LOPRESSOR) 50 " mg tablet    Multiple Vitamins-Minerals (MULTIVITAMIN MEN 50+ PO)    nitroglycerin (NITROSTAT) 0.4 mg SL tablet    Omega-3 Fatty Acids (fish oil) 1,000 mg    ondansetron (ZOFRAN) 4 mg tablet    OneTouch Delica Lancets 33G MISC    pantoprazole (PROTONIX) 40 mg tablet    polyethylene glycol (GOLYTELY) 4000 mL solution    ranolazine (RANEXA) 500 mg 12 hr tablet    sitaGLIPtin (JANUVIA) 100 mg tablet    torsemide (DEMADEX) 20 mg tablet       pantoprazole (PROTONIX) 80 mg in sodium chloride 0.9 % 100 mL infusion, 8 mg/hr, Last Rate: 8 mg/hr (04/11/24 0313)        Assessment:  Principal Problem:    Shortness of breath  Active Problems:    Primary hypertension    Hx of CABG    Type 2 diabetes mellitus with diabetic polyneuropathy, with long-term current use of insulin (HCC)    Chronic HFpEF/Moderate pHTN (HFpEF) (HCC)    Chronic kidney disease-mineral and bone disorder    Stable angina pectoris    Symptomatic anemia      Counseling / Coordination of Care  Total floor / unit time spent today 20 minutes.  Greater than 50% of total time was spent with the patient and / or family counseling and / or coordination of care.      ** Please Note: Dragon 360 Dictation voice to text software may have been used in the creation of this document. **

## 2024-04-11 NOTE — ASSESSMENT & PLAN NOTE
Examines euvolemic   Last echocardiogram 6/2023: 50%, grade II diastolic dysfunction.  Patient complained complains of significant dyspnea on exertion which currently is most likely from anemia rather than heart failure.  Improved significantly after dose of IV Lasix.  Resume torsemide on discharge.

## 2024-04-11 NOTE — PROGRESS NOTES
Progress Note - Thomas Horne 79 y.o. male MRN: [id number]    Unit/Bed#: S -01 Encounter: [id number]      Expand All Collapse All    Consultation -  [facility]  Thomas Horne 79 y.o. male MRN: [id number]  Unit/Bed#: -01 Encounter: [id number]           Inpatient consult to gastroenterology  Consult performed by: Tj Paulino PA-C  Consult ordered by: APOLLO Ordaz              Reason for Consult / Principal Problem: Anemia, heme positive stool     ASSESSMENT and PLAN:    Principal Problem:    Shortness of breath  Active Problems:    Primary hypertension    Hx of CABG    Type 2 diabetes mellitus with diabetic polyneuropathy, with long-term current use of insulin (HCC)    Chronic HFpEF/Moderate pHTN (HFpEF) (HCC)    Chronic kidney disease-mineral and bone disorder    Stable angina pectoris    Symptomatic anemia  History of duodenal ulcer     -EGD Wednesday, April 10, 2024 negative   -Hemoglobin stable 9.0-9.1  - Follow hemoglobin and transfuse as needed    GI will sign off.  Please call with questions.           ----------------------------------------------------------------------------------------------------------------    Subjective:     80 y/o white male with past medical history of coronary artery disease status post stenting currently and peripheral vascular disease status post stenting on aspirin and Plavix last dose Monday 4/8/24 AM, chronic anemia, prostate cancer, congestive heart failure, chronic kidney disease, diabetes, history of duodenal ulcer, hyperlipidemia, hypertension and sleep apnea who presents with shortness of breath and sense of feeling weak.  Has been going on since December 2023 but worsening over the past 2 weeks.  Dyspnea on exertion.  He does have chronic anemia again and is on iron tablets twice daily orally.  History of GI bleeding in December 2023 at which time EGD on December 26, 2023 showed a small nonbleeding duodenal ulcer.  Most  recent colonoscopy on February 14, 2024 with no signs of bleeding.     Interval History:  He underwent an EGD on Wednesday, April 10, 2024 which was completely normal.  Patient's hemoglobin is stable at this time 9.0-9.1.  He feels much improved on his symptoms of weakness and shortness of breath since his admission.  He offers no GI complaints.  Patient denies any abdominal pain, nausea/vomiting, pain or difficulty swallowing, change in bowels, black or bloody stools.  Is tolerating a regular diet      Objective:     Vitals: Blood pressure 140/58, pulse (!) 54, temperature 97.7 °F (36.5 °C), resp. rate 18, weight 104 kg (228 lb 9.6 oz), SpO2 97%.,Body mass index is 29.35 kg/m².      Intake/Output Summary (Last 24 hours) at 4/11/2024 0847  Last data filed at 4/10/2024 1413  Gross per 24 hour   Intake 100 ml   Output --   Net 100 ml       Physical Exam:     General Appearance: Alert, appears stated age and cooperative  HEENT: NCAT, sclera anicteric  Lungs: Clear to auscultation bilaterally, no rales or rhonchi, no labored breathing/accessory muscle use  Heart: Regular rate and rhythm, S1, S2 normal, no murmur, click, rub or gallop  Abdomen: Soft, non-tender, non-distended; bowel sounds normal; no masses or no organomegaly  Extremities: No cyanosis, clubbing, or edema  Neuro: Alert and oriented x 3  Psych: Normal behavior    Invasive Devices       Peripheral Intravenous Line  Duration             Peripheral IV 04/09/24 Dorsal (posterior);Right Forearm 2 days                    Lab Results:  Results from last 7 days   Lab Units 04/11/24  0513   WBC Thousand/uL 6.77   HEMOGLOBIN g/dL 9.1*   HEMATOCRIT % 32.8*   PLATELETS Thousands/uL 267   SEGS PCT % 69   LYMPHO PCT % 20   MONO PCT % 8   EOS PCT % 3     Results from last 7 days   Lab Units 04/11/24  0513 04/09/24  0300 04/08/24  1900   POTASSIUM mmol/L 3.9   < > 4.0   CHLORIDE mmol/L 105   < > 103   CO2 mmol/L 24   < > 29   BUN mg/dL 25   < > 37*   CREATININE mg/dL  "1.51*   < > 2.04*   CALCIUM mg/dL 9.2   < > 9.2   ALK PHOS U/L  --   --  37   ALT U/L  --   --  12   AST U/L  --   --  16    < > = values in this interval not displayed.     Invalid input(s): \"BILI\"            Imaging Studies: I have personally reviewed pertinent imaging studies.    EGD    Result Date: 4/10/2024  Impression: The esophagus, stomach and duodenum appeared normal. RECOMMENDATION:  Monitor hemoglobin    Raquel Yan MD     XR chest 1 view portable    Result Date: 4/9/2024  Impression: Mild pulmonary vascular congestion/volume overload Workstation performed: [id number]         Tj Paulino PA-C  Gastroenterology          "

## 2024-04-11 NOTE — PLAN OF CARE
Problem: Potential for Falls  Goal: Patient will remain free of falls  Description: INTERVENTIONS:  - Educate patient/family on patient safety including physical limitations  - Instruct patient to call for assistance with activity   - Consult OT/PT to assist with strengthening/mobility   - Keep Call bell within reach  - Keep bed low and locked with side rails adjusted as appropriate  - Keep care items and personal belongings within reach  - Initiate and maintain comfort rounds  - Make Fall Risk Sign visible to staff  - Offer Toileting every 2 Hours, in advance of need  - Initiate/Maintain bed alarm  - Obtain necessary fall risk management equipment: bed alarm  - Apply yellow socks and bracelet for high fall risk patients  - Consider moving patient to room near nurses station  Outcome: Adequate for Discharge     Problem: PAIN - ADULT  Goal: Verbalizes/displays adequate comfort level or baseline comfort level  Description: Interventions:  - Encourage patient to monitor pain and request assistance  - Assess pain using appropriate pain scale  - Administer analgesics based on type and severity of pain and evaluate response  - Implement non-pharmacological measures as appropriate and evaluate response  - Consider cultural and social influences on pain and pain management  - Notify physician/advanced practitioner if interventions unsuccessful or patient reports new pain  Outcome: Adequate for Discharge     Problem: INFECTION - ADULT  Goal: Absence or prevention of progression during hospitalization  Description: INTERVENTIONS:  - Assess and monitor for signs and symptoms of infection  - Monitor lab/diagnostic results  - Monitor all insertion sites, i.e. indwelling lines, tubes, and drains  - Monitor endotracheal if appropriate and nasal secretions for changes in amount and color  - [place] appropriate cooling/warming therapies per order  - Administer medications as ordered  - Instruct and encourage patient and family  to use good hand hygiene technique  - Identify and instruct in appropriate isolation precautions for identified infection/condition  Outcome: Adequate for Discharge  Goal: Absence of fever/infection during neutropenic period  Description: INTERVENTIONS:  - Monitor WBC    Outcome: Adequate for Discharge     Problem: SAFETY ADULT  Goal: Patient will remain free of falls  Description: INTERVENTIONS:  - Educate patient/family on patient safety including physical limitations  - Instruct patient to call for assistance with activity   - Consult OT/PT to assist with strengthening/mobility   - Keep Call bell within reach  - Keep bed low and locked with side rails adjusted as appropriate  - Keep care items and personal belongings within reach  - Initiate and maintain comfort rounds  - Make Fall Risk Sign visible to staff  - Offer Toileting every 2 Hours, in advance of need  - Initiate/Maintain bed alarm  - Obtain necessary fall risk management equipment: bed alarm  - Apply yellow socks and bracelet for high fall risk patients  - Consider moving patient to room near nurses station  Outcome: Adequate for Discharge  Goal: Maintain or return to baseline ADL function  Description: INTERVENTIONS:  -  Assess patient's ability to carry out ADLs; assess patient's baseline for ADL function and identify physical deficits which impact ability to perform ADLs (bathing, care of mouth/teeth, toileting, grooming, dressing, etc.)  - Assess/evaluate cause of self-care deficits   - Assess range of motion  - Assess patient's mobility; develop plan if impaired  - Assess patient's need for assistive devices and provide as appropriate  - Encourage maximum independence but intervene and supervise when necessary  - Involve family in performance of ADLs  - Assess for home care needs following discharge   - Consider OT consult to assist with ADL evaluation and planning for discharge  - Provide patient education as appropriate  Outcome: Adequate for  Discharge  Goal: Maintains/Returns to pre admission functional level  Description: INTERVENTIONS:  - Perform AM-PAC 6 Click Basic Mobility/ Daily Activity assessment daily.  - Set and communicate daily mobility goal to care team and patient/family/caregiver.   - Collaborate with rehabilitation services on mobility goals if consulted  - Perform Range of Motion 2 times a day.  - Reposition patient every 2 hours.  - Dangle patient 2 times a day  - Stand patient 2 times a day  - Ambulate patient 3 times a day  - Out of bed to chair 3 times a day   - Out of bed for meals 3 times a day  - Out of bed for toileting  - Record patient progress and toleration of activity level   Outcome: Adequate for Discharge     Problem: DISCHARGE PLANNING  Goal: Discharge to home or other facility with appropriate resources  Description: INTERVENTIONS:  - Identify barriers to discharge w/patient and caregiver  - Arrange for needed discharge resources and transportation as appropriate  - Identify discharge learning needs (meds, wound care, etc.)  - Arrange for interpretive services to assist at discharge as needed  - Refer to Case Management Department for coordinating discharge planning if the patient needs post-hospital services based on physician/advanced practitioner order or complex needs related to functional status, cognitive ability, or social support system  Outcome: Adequate for Discharge     Problem: Knowledge Deficit  Goal: Patient/family/caregiver demonstrates understanding of disease process, treatment plan, medications, and discharge instructions  Description: Complete learning assessment and assess knowledge base.  Interventions:  - Provide teaching at level of understanding  - Provide teaching via preferred learning methods  Outcome: Adequate for Discharge

## 2024-04-11 NOTE — PLAN OF CARE
Problem: Potential for Falls  Goal: Patient will remain free of falls  Description: INTERVENTIONS:  - Educate patient/family on patient safety including physical limitations  - Instruct patient to call for assistance with activity   - Consult OT/PT to assist with strengthening/mobility   - Keep Call bell within reach  - Keep bed low and locked with side rails adjusted as appropriate  - Keep care items and personal belongings within reach  - Initiate and maintain comfort rounds  - Make Fall Risk Sign visible to staff  - Offer Toileting every  Hours, in advance of need  - Initiate/Maintain alarm  - Obtain necessary fall risk management equipment:   - Apply yellow socks and bracelet for high fall risk patients  - Consider moving patient to room near nurses station  Outcome: Progressing     Problem: PAIN - ADULT  Goal: Verbalizes/displays adequate comfort level or baseline comfort level  Description: Interventions:  - Encourage patient to monitor pain and request assistance  - Assess pain using appropriate pain scale  - Administer analgesics based on type and severity of pain and evaluate response  - Implement non-pharmacological measures as appropriate and evaluate response  - Consider cultural and social influences on pain and pain management  - Notify physician/advanced practitioner if interventions unsuccessful or patient reports new pain  Outcome: Progressing     Problem: INFECTION - ADULT  Goal: Absence or prevention of progression during hospitalization  Description: INTERVENTIONS:  - Assess and monitor for signs and symptoms of infection  - Monitor lab/diagnostic results  - Monitor all insertion sites, i.e. indwelling lines, tubes, and drains  - Monitor endotracheal if appropriate and nasal secretions for changes in amount and color  - [place] appropriate cooling/warming therapies per order  - Administer medications as ordered  - Instruct and encourage patient and family to use good hand hygiene technique  -  Identify and instruct in appropriate isolation precautions for identified infection/condition  Outcome: Progressing  Goal: Absence of fever/infection during neutropenic period  Description: INTERVENTIONS:  - Monitor WBC    Outcome: Progressing     Problem: SAFETY ADULT  Goal: Patient will remain free of falls  Description: INTERVENTIONS:  - Educate patient/family on patient safety including physical limitations  - Instruct patient to call for assistance with activity   - Consult OT/PT to assist with strengthening/mobility   - Keep Call bell within reach  - Keep bed low and locked with side rails adjusted as appropriate  - Keep care items and personal belongings within reach  - Initiate and maintain comfort rounds  - Make Fall Risk Sign visible to staff  - Offer Toileting every Hours, in advance of need  - Initiate/Maintain alarm  - Obtain necessary fall risk management equipment:   - Apply yellow socks and bracelet for high fall risk patients  - Consider moving patient to room near nurses station  Outcome: Progressing  Goal: Maintain or return to baseline ADL function  Description: INTERVENTIONS:  -  Assess patient's ability to carry out ADLs; assess patient's baseline for ADL function and identify physical deficits which impact ability to perform ADLs (bathing, care of mouth/teeth, toileting, grooming, dressing, etc.)  - Assess/evaluate cause of self-care deficits   - Assess range of motion  - Assess patient's mobility; develop plan if impaired  - Assess patient's need for assistive devices and provide as appropriate  - Encourage maximum independence but intervene and supervise when necessary  - Involve family in performance of ADLs  - Assess for home care needs following discharge   - Consider OT consult to assist with ADL evaluation and planning for discharge  - Provide patient education as appropriate  Outcome: Progressing  Goal: Maintains/Returns to pre admission functional level  Description: INTERVENTIONS:  -  Perform AM-PAC 6 Click Basic Mobility/ Daily Activity assessment daily.  - Set and communicate daily mobility goal to care team and patient/family/caregiver.   - Collaborate with rehabilitation services on mobility goals if consulted  - Perform Range of Motion times a day.  - Reposition patient every  hours.  - Dangle patient  times a day  - Stand patient  times a day  - Ambulate patient  times a day  - Out of bed to chair  times a day   - Out of bed for meals  times a day  - Out of bed for toileting  - Record patient progress and toleration of activity level   Outcome: Progressing

## 2024-04-11 NOTE — ASSESSMENT & PLAN NOTE
Lab Results   Component Value Date    HGBA1C 7.2 (H) 04/08/2024     Resume home regimen on discharge

## 2024-04-11 NOTE — CASE MANAGEMENT
Case Management Discharge Planning Note    Patient name Thomas Horne  Location S /S -01 MRN [id number]  : 1945 Date 2024       Current Admission Date: 2024  Current Admission Diagnosis:Shortness of breath   Patient Active Problem List    Diagnosis Date Noted    Shortness of breath 2024    History of colon polyps 2024    Duodenal ulcer 2024    Acute blood loss anemia 2024    Multiple gastric ulcers 2023    Iron deficiency anemia due to chronic blood loss 2023    Melena 2023    Symptomatic anemia 2023    Acute respiratory distress 2023    Frequent PVCs 2023    Acute on chronic diastolic heart failure (HCC) 2023    Acute kidney injury superimposed on chronic kidney disease  ([facility]) 2023    Diabetic ulcer of right midfoot associated with diabetes mellitus due to underlying condition, limited to breakdown of skin ([facility]) 06/15/2023    Hypertensive urgency 2023    Elevated troponin level not due myocardial infarction 2023    Stable angina pectoris 2023    Chronic kidney disease-mineral and bone disorder 2022    Mild aortic stenosis 2022    Chronic HFpEF/Moderate pHTN (HFpEF) ([facility]) 11/10/2022    Gross hematuria 2022    Platelets decreased ([facility]) 2022    Acute kidney injury superimposed on chronic kidney disease  ([facility]) 2022    Actinic keratoses 2022    Type 2 diabetes mellitus with diabetic peripheral angiopathy without gangrene, with long-term current use of insulin (HCC) 2022    Varicose veins of left lower extremity 2021    History of endovascular stent graft for abdominal aortic aneurysm (AAA) 2021    Bruit (arterial) 2021    Peripheral artery disease (HCC) 2021    Type 2 diabetes mellitus with diabetic polyneuropathy, with long-term current use of insulin ([facility]) 06/10/2021    Mixed hyperlipidemia 2021    Primary hypertension  05/11/2021    Coronary artery disease involving native coronary artery of native heart without angina pectoris 05/11/2021    S/P angioplasty with stent 05/11/2021    Hx of CABG 05/11/2021    S/P AAA repair 05/11/2021    S/P prostatectomy 05/11/2021    H/O prostate cancer 05/11/2021      LOS (days): 2  Geometric Mean LOS (GMLOS) (days): 4.6  Days to GMLOS:2.6     OBJECTIVE:  Risk of Unplanned Readmission Score: 18.49         Current admission status: Inpatient   Preferred Pharmacy:   [id number] - [place], [place] - [address] [facility]  [address] [facility] [place] [address]  Phone: [phone] Fax: [phone]    [facility] - [place], [place] - [address]  [address]  [facility] [address]  [facility] [address]  Phone: [phone] Fax: [phone]    [facility] [place] [place] [facility] [address] [place] [address] [address] [facility] [address]  [facility] [address]  Phone: [phone] Fax: [phone]    Primary Care Provider: Frank Lombardi, DO    Primary Insurance: JUAN BROOKS  Secondary Insurance:     DISCHARGE DETAILS:        IMM Given (Date):: 04/11/24  IMM Given to:: Patient     IMM reviewed with patient, patient agrees with discharge determination.    CM met with patient at bedside, patient reports that he is ready for DC. Patient reports that family will be transporting him home.  Patient does not have any CM needs at this time.

## 2024-04-11 NOTE — ASSESSMENT & PLAN NOTE
Complaint of exertional chest pain shortness of breath on admission.  Improved after getting a dose of IV Lasix.  Now better.  Resume home regimen on discharge except for Plavix.

## 2024-04-12 ENCOUNTER — TRANSITIONAL CARE MANAGEMENT (OUTPATIENT)
Dept: FAMILY MEDICINE CLINIC | Facility: CLINIC | Age: 79
End: 2024-04-12

## 2024-04-12 DIAGNOSIS — Z71.89 COORDINATION OF COMPLEX CARE: Primary | ICD-10-CM

## 2024-04-15 ENCOUNTER — PATIENT OUTREACH (OUTPATIENT)
Dept: FAMILY MEDICINE CLINIC | Facility: CLINIC | Age: 79
End: 2024-04-15

## 2024-04-15 ENCOUNTER — RA CDI HCC (OUTPATIENT)
Dept: OTHER | Facility: HOSPITAL | Age: 79
End: 2024-04-15

## 2024-04-15 NOTE — PROGRESS NOTES
HRR referral received via IB. Chart reviewed. Patient admitted to IP 4/804/11 at [facility] with symptomatic anemia. Patient c/o black stool x 2 days and SOB, exertional CP. Hgb 7.4.    PMH: DM2 on Insulin, most recent A1C was 7.2 on 4/8/24, CKD, HF, CABG, CAD, HTN, MI, SUSAN cannot tolerate cpap      This RNCM called patient for follow up. Patient says he is doing okay. He says his breathing has improved but still has some SOB on exertion. He denies any CP, chest tightness, coughing, dizziness, light headedness or swelling. He denies any abdominal pain or bloody stools. He denies any bleeding.     Patient weighs self daily. Today he weighed  226.8 lbs. Patient is aware to call his cardiologist with a wt gain of 3 lbs in 1 day or 5 lbs in 5 days.     Patient checks BS's daily. Most recents were 111, 140.    Patients AVS and Medications reviewed. He is not taking Plavix. He is aware that Torsemide is his diuretic and is aware of what it does and its importance. Patient denies any questions or concerns regarding any medications.     Patient says his shins have been hurting him when he stands for a while. He denies any redness or swelling. He says it is new though and he is going to let the doctor know when he sees him on Thursday. I told him I would send a message to his PCP as well.     Patients apt's reviewed: 4/18 PCP, 5/1 Oncology, 5/7 Podiatry and 5/28 Nephrology. Patient states he will drive himself to apt's.    Patient was appreciative of the call but declines CCM services or the need for follow up at this time.    An IB message sent to Dr Lombardi and office staff regarding the above.

## 2024-04-17 ENCOUNTER — APPOINTMENT (OUTPATIENT)
Dept: LAB | Facility: CLINIC | Age: 79
End: 2024-04-17
Payer: COMMERCIAL

## 2024-04-17 DIAGNOSIS — N17.9 ACUTE KIDNEY INJURY SUPERIMPOSED ON CHRONIC KIDNEY DISEASE  (HCC): ICD-10-CM

## 2024-04-17 DIAGNOSIS — E11.42 TYPE 2 DIABETES MELLITUS WITH DIABETIC POLYNEUROPATHY, WITH LONG-TERM CURRENT USE OF INSULIN (HCC): ICD-10-CM

## 2024-04-17 DIAGNOSIS — N18.9 ACUTE KIDNEY INJURY SUPERIMPOSED ON CHRONIC KIDNEY DISEASE  (HCC): ICD-10-CM

## 2024-04-17 DIAGNOSIS — N18.30 BENIGN HYPERTENSION WITH CKD (CHRONIC KIDNEY DISEASE) STAGE III (HCC): ICD-10-CM

## 2024-04-17 DIAGNOSIS — E78.2 MIXED HYPERLIPIDEMIA: ICD-10-CM

## 2024-04-17 DIAGNOSIS — I12.9 BENIGN HYPERTENSION WITH CKD (CHRONIC KIDNEY DISEASE) STAGE III (HCC): ICD-10-CM

## 2024-04-17 DIAGNOSIS — N18.32 STAGE 3B CHRONIC KIDNEY DISEASE (HCC): ICD-10-CM

## 2024-04-17 DIAGNOSIS — I25.10 CORONARY ARTERY DISEASE INVOLVING NATIVE CORONARY ARTERY OF NATIVE HEART WITHOUT ANGINA PECTORIS: ICD-10-CM

## 2024-04-17 DIAGNOSIS — Z79.4 TYPE 2 DIABETES MELLITUS WITH DIABETIC POLYNEUROPATHY, WITH LONG-TERM CURRENT USE OF INSULIN (HCC): ICD-10-CM

## 2024-04-17 DIAGNOSIS — I10 PRIMARY HYPERTENSION: ICD-10-CM

## 2024-04-17 DIAGNOSIS — D50.0 IRON DEFICIENCY ANEMIA DUE TO CHRONIC BLOOD LOSS: ICD-10-CM

## 2024-04-17 LAB
ALBUMIN SERPL BCP-MCNC: 4.2 G/DL (ref 3.5–5)
ALP SERPL-CCNC: 38 U/L (ref 34–104)
ALT SERPL W P-5'-P-CCNC: 16 U/L (ref 7–52)
ANION GAP SERPL CALCULATED.3IONS-SCNC: 10 MMOL/L (ref 4–13)
AST SERPL W P-5'-P-CCNC: 21 U/L (ref 13–39)
BASOPHILS # BLD AUTO: 0.04 THOUSANDS/ÂΜL (ref 0–0.1)
BASOPHILS NFR BLD AUTO: 1 % (ref 0–1)
BILIRUB SERPL-MCNC: 0.5 MG/DL (ref 0.2–1)
BUN SERPL-MCNC: 44 MG/DL (ref 5–25)
CALCIUM SERPL-MCNC: 9.3 MG/DL (ref 8.4–10.2)
CHLORIDE SERPL-SCNC: 99 MMOL/L (ref 96–108)
CHOLEST SERPL-MCNC: 131 MG/DL
CO2 SERPL-SCNC: 31 MMOL/L (ref 21–32)
CREAT SERPL-MCNC: 2.07 MG/DL (ref 0.6–1.3)
CREAT UR-MCNC: 57.3 MG/DL
EOSINOPHIL # BLD AUTO: 0.2 THOUSAND/ÂΜL (ref 0–0.61)
EOSINOPHIL NFR BLD AUTO: 2 % (ref 0–6)
ERYTHROCYTE [DISTWIDTH] IN BLOOD BY AUTOMATED COUNT: 21.6 % (ref 11.6–15.1)
EST. AVERAGE GLUCOSE BLD GHB EST-MCNC: 154 MG/DL
GFR SERPL CREATININE-BSD FRML MDRD: 29 ML/MIN/1.73SQ M
GLUCOSE P FAST SERPL-MCNC: 126 MG/DL (ref 65–99)
HBA1C MFR BLD: 7 %
HCT VFR BLD AUTO: 36 % (ref 36.5–49.3)
HDLC SERPL-MCNC: 51 MG/DL
HGB BLD-MCNC: 10.1 G/DL (ref 12–17)
IMM GRANULOCYTES # BLD AUTO: 0.03 THOUSAND/UL (ref 0–0.2)
IMM GRANULOCYTES NFR BLD AUTO: 0 % (ref 0–2)
LDLC SERPL CALC-MCNC: 57 MG/DL (ref 0–100)
LYMPHOCYTES # BLD AUTO: 1.34 THOUSANDS/ÂΜL (ref 0.6–4.47)
LYMPHOCYTES NFR BLD AUTO: 16 % (ref 14–44)
MAGNESIUM SERPL-MCNC: 2.2 MG/DL (ref 1.9–2.7)
MCH RBC QN AUTO: 20.3 PG (ref 26.8–34.3)
MCHC RBC AUTO-ENTMCNC: 28.1 G/DL (ref 31.4–37.4)
MCV RBC AUTO: 72 FL (ref 82–98)
MICROALBUMIN UR-MCNC: <7 MG/L
MICROALBUMIN/CREAT 24H UR: <12 MG/G CREATININE (ref 0–30)
MONOCYTES # BLD AUTO: 0.53 THOUSAND/ÂΜL (ref 0.17–1.22)
MONOCYTES NFR BLD AUTO: 7 % (ref 4–12)
NEUTROPHILS # BLD AUTO: 6.06 THOUSANDS/ÂΜL (ref 1.85–7.62)
NEUTS SEG NFR BLD AUTO: 74 % (ref 43–75)
NRBC BLD AUTO-RTO: 0 /100 WBCS
PHOSPHATE SERPL-MCNC: 3.9 MG/DL (ref 2.3–4.1)
PLATELET # BLD AUTO: 301 THOUSANDS/UL (ref 149–390)
PMV BLD AUTO: 11.7 FL (ref 8.9–12.7)
POTASSIUM SERPL-SCNC: 4 MMOL/L (ref 3.5–5.3)
PROT SERPL-MCNC: 8.1 G/DL (ref 6.4–8.4)
PTH-INTACT SERPL-MCNC: 50.7 PG/ML (ref 12–88)
RBC # BLD AUTO: 4.98 MILLION/UL (ref 3.88–5.62)
SODIUM SERPL-SCNC: 140 MMOL/L (ref 135–147)
TRIGL SERPL-MCNC: 113 MG/DL
WBC # BLD AUTO: 8.2 THOUSAND/UL (ref 4.31–10.16)

## 2024-04-17 PROCEDURE — 82043 UR ALBUMIN QUANTITATIVE: CPT

## 2024-04-17 PROCEDURE — 83970 ASSAY OF PARATHORMONE: CPT

## 2024-04-17 PROCEDURE — 36415 COLL VENOUS BLD VENIPUNCTURE: CPT

## 2024-04-17 PROCEDURE — 82570 ASSAY OF URINE CREATININE: CPT

## 2024-04-17 PROCEDURE — 83036 HEMOGLOBIN GLYCOSYLATED A1C: CPT

## 2024-04-17 PROCEDURE — 84100 ASSAY OF PHOSPHORUS: CPT

## 2024-04-17 PROCEDURE — 82306 VITAMIN D 25 HYDROXY: CPT

## 2024-04-17 PROCEDURE — 83735 ASSAY OF MAGNESIUM: CPT

## 2024-04-17 PROCEDURE — 80053 COMPREHEN METABOLIC PANEL: CPT

## 2024-04-17 PROCEDURE — 80061 LIPID PANEL: CPT

## 2024-04-17 PROCEDURE — 85025 COMPLETE CBC W/AUTO DIFF WBC: CPT

## 2024-04-18 ENCOUNTER — HOSPITAL ENCOUNTER (OUTPATIENT)
Dept: RADIOLOGY | Facility: HOSPITAL | Age: 79
Discharge: HOME | End: 2024-04-18
Payer: COMMERCIAL

## 2024-04-18 ENCOUNTER — TELEPHONE (OUTPATIENT)
Dept: FAMILY MEDICINE CLINIC | Facility: CLINIC | Age: 79
End: 2024-04-18

## 2024-04-18 ENCOUNTER — PREP FOR PROCEDURE (OUTPATIENT)
Dept: GASTROENTEROLOGY | Facility: CLINIC | Age: 79
End: 2024-04-18

## 2024-04-18 ENCOUNTER — OFFICE VISIT (OUTPATIENT)
Dept: FAMILY MEDICINE CLINIC | Facility: CLINIC | Age: 79
End: 2024-04-18
Payer: COMMERCIAL

## 2024-04-18 ENCOUNTER — APPOINTMENT (OUTPATIENT)
Dept: LAB | Facility: HOSPITAL | Age: 79
End: 2024-04-18
Payer: COMMERCIAL

## 2024-04-18 VITALS
DIASTOLIC BLOOD PRESSURE: 72 MMHG | SYSTOLIC BLOOD PRESSURE: 140 MMHG | HEIGHT: 74 IN | WEIGHT: 234 LBS | TEMPERATURE: 97.6 F | RESPIRATION RATE: 16 BRPM | HEART RATE: 72 BPM | BODY MASS INDEX: 30.03 KG/M2

## 2024-04-18 DIAGNOSIS — M79.605 PAIN IN LEFT LEG: ICD-10-CM

## 2024-04-18 DIAGNOSIS — D50.0 IRON DEFICIENCY ANEMIA DUE TO CHRONIC BLOOD LOSS: Primary | ICD-10-CM

## 2024-04-18 DIAGNOSIS — R68.89 ABNORMAL ANKLE BRACHIAL INDEX (ABI): ICD-10-CM

## 2024-04-18 DIAGNOSIS — R09.89 DECREASED PEDAL PULSES: ICD-10-CM

## 2024-04-18 DIAGNOSIS — D64.9 SYMPTOMATIC ANEMIA: ICD-10-CM

## 2024-04-18 DIAGNOSIS — M79.669 CALF TENDERNESS: Primary | ICD-10-CM

## 2024-04-18 DIAGNOSIS — D62 ACUTE BLOOD LOSS ANEMIA: Primary | ICD-10-CM

## 2024-04-18 DIAGNOSIS — I82.452 ACUTE DEEP VEIN THROMBOSIS (DVT) OF LEFT PERONEAL VEIN (HCC): Primary | ICD-10-CM

## 2024-04-18 DIAGNOSIS — I50.32 CHRONIC HEART FAILURE WITH PRESERVED EJECTION FRACTION (HFPEF) (HCC): ICD-10-CM

## 2024-04-18 DIAGNOSIS — I82.452 ACUTE DEEP VEIN THROMBOSIS (DVT) OF LEFT PERONEAL VEIN (HCC): ICD-10-CM

## 2024-04-18 DIAGNOSIS — D50.0 IRON DEFICIENCY ANEMIA SECONDARY TO BLOOD LOSS (CHRONIC): ICD-10-CM

## 2024-04-18 DIAGNOSIS — E11.51 TYPE 2 DIABETES MELLITUS WITH DIABETIC PERIPHERAL ANGIOPATHY WITHOUT GANGRENE, WITH LONG-TERM CURRENT USE OF INSULIN (HCC): ICD-10-CM

## 2024-04-18 DIAGNOSIS — Z79.4 TYPE 2 DIABETES MELLITUS WITH DIABETIC PERIPHERAL ANGIOPATHY WITHOUT GANGRENE, WITH LONG-TERM CURRENT USE OF INSULIN (HCC): ICD-10-CM

## 2024-04-18 DIAGNOSIS — R20.9 COLD EXTREMITIES: ICD-10-CM

## 2024-04-18 DIAGNOSIS — I10 PRIMARY HYPERTENSION: ICD-10-CM

## 2024-04-18 DIAGNOSIS — M79.669 CALF TENDERNESS: ICD-10-CM

## 2024-04-18 LAB
25(OH)D3 SERPL-MCNC: 39.1 NG/ML (ref 30–100)
INR PPP: 1.07 (ref 0.84–1.19)
PROTHROMBIN TIME: 14.1 SECONDS (ref 11.6–14.5)

## 2024-04-18 PROCEDURE — 85610 PROTHROMBIN TIME: CPT

## 2024-04-18 PROCEDURE — 93971 EXTREMITY STUDY: CPT

## 2024-04-18 PROCEDURE — 36415 COLL VENOUS BLD VENIPUNCTURE: CPT

## 2024-04-18 PROCEDURE — 99496 TRANSJ CARE MGMT HIGH F2F 7D: CPT | Performed by: NURSE PRACTITIONER

## 2024-04-18 RX ORDER — WARFARIN SODIUM 1 MG/1
1 TABLET ORAL
Qty: 90 TABLET | Refills: 0 | Status: SHIPPED | OUTPATIENT
Start: 2024-04-18

## 2024-04-18 RX ORDER — WARFARIN SODIUM 3 MG/1
3 TABLET ORAL
Qty: 90 TABLET | Refills: 0 | Status: SHIPPED | OUTPATIENT
Start: 2024-04-18

## 2024-04-18 RX ORDER — SODIUM CHLORIDE 9 MG/ML
20 INJECTION, SOLUTION INTRAVENOUS ONCE
Status: CANCELLED | OUTPATIENT
Start: 2024-04-24

## 2024-04-18 RX ORDER — NITROGLYCERIN 0.4 MG/1
0.4 TABLET SUBLINGUAL
Qty: 30 TABLET | Refills: 3 | Status: CANCELLED | OUTPATIENT
Start: 2024-04-18

## 2024-04-18 NOTE — ASSESSMENT & PLAN NOTE
Will continue with torsemide  Wt Readings from Last 3 Encounters:   04/18/24 106 kg (234 lb)   04/11/24 104 kg (228 lb 9.6 oz)   02/27/24 105 kg (231 lb)

## 2024-04-18 NOTE — TELEPHONE ENCOUNTER
Donald from vascular lab, called to give results   Donald can be reached at [phone]    Sultana Austin, CMA

## 2024-04-18 NOTE — TELEPHONE ENCOUNTER
Report received from vascular as patient has acute non occlusive DVT of his paired gastrocnemius and peroneal veins. Discussed with Dr. Lombardi and patient needs to be started on anticoagulation due to risk of stroke and PE and having acute symptoms. Dr. Lombardi recommended to start coumadin as we can monitor levels closely and able to reverse quickly for any bleeding  I also called vascular surgeon Dr. Clark to check if patient is a candidate for IVC filter due to recent symptomatic anemia and he recommended anti coagulation based on PCP discretion but no IVC filter at this time.  Then I discussed all the above with VICKI Sky from gastro and discussed patient findings and received OK to start coumadin and will continue aspirin and they will consider doing capsule endoscopy with patient.  Patient called and informed results and discussed all above and importance of anti coagulation. Discussed possible complications of DVT like PE and stroke and also risks of anti coagulation like bleeding especially with recent anemia. Discussed risks versus benefits and patient agrees to start coumadin and will start at 3 mg daily and will repeat INR on 4/22/24 and will go to ER for any weakness and signs of bleeding.  APOLLO Hoff

## 2024-04-18 NOTE — TELEPHONE ENCOUNTER
I spoke to the patient, he stated that he went over the bloodwork with Ilana today at his TCM with her. Did you want him to still come in for a follow up?    Dayami Maravilla LPN

## 2024-04-18 NOTE — PROGRESS NOTES
Assessment/Plan:  Will do STAT venous doppler to r/o DVT and also will repeat arterial duplex to check any worsening if no DVT  1. Calf tenderness  -     VAS VENOUS DUPLEX -LOWER LIMB UNILATERAL; Future; Expected date: 04/18/2024  -     VAS ARTERIAL DUPLEX-LOWER LIMB UNILATERAL; Future; Expected date: 04/18/2024    2. Pain in left leg  -     VAS VENOUS DUPLEX -LOWER LIMB UNILATERAL; Future; Expected date: 04/18/2024  -     VAS ARTERIAL DUPLEX-LOWER LIMB UNILATERAL; Future; Expected date: 04/18/2024    3. Decreased pedal pulses  -     VAS ARTERIAL DUPLEX-LOWER LIMB UNILATERAL; Future; Expected date: 04/18/2024    4. Abnormal ankle brachial index (FABIOLA)  -     VAS ARTERIAL DUPLEX-LOWER LIMB UNILATERAL; Future; Expected date: 04/18/2024    5. Cold extremities  -     VAS ARTERIAL DUPLEX-LOWER LIMB UNILATERAL; Future; Expected date: 04/18/2024    6. Type 2 diabetes mellitus with diabetic peripheral angiopathy without gangrene, with long-term current use of insulin (HCC)  Assessment & Plan:  Stable with current regimen  Lab Results   Component Value Date    HGBA1C 7.0 (H) 04/17/2024       7. Primary hypertension  Assessment & Plan:  Stable with current regimen      8. Chronic HFpEF/Moderate pHTN (HFpEF) (HCC)  Assessment & Plan:  Will continue with torsemide  Wt Readings from Last 3 Encounters:   04/18/24 106 kg (234 lb)   04/11/24 104 kg (228 lb 9.6 oz)   02/27/24 105 kg (231 lb)               9. Symptomatic anemia  Assessment & Plan:  CBC improved and will continue with oral iron          Patient Instructions:  Supportive care discussed and advised.  Advised to RTO for any worsening and no improvement.   Follow up for no improvement and worsening of conditions.  Patient advised and educated when to see immediate medical care.    Return if symptoms worsen or fail to improve.      Future Appointments   Date Time Provider Department Center   4/19/2024  2:30 PM [place] [facility] 1 [facility]   5/1/2024 10:00 AM Karma  "LARISA Melissa HEM ONC [facility]   5/7/2024  9:30 AM Sundar Arango DPM POD COV Practice[facility]Ort   5/28/2024  9:00 AM APOLLO Clark NEPH [facility] Med [facility]   6/21/2024  9:00 AM [place] [facility] 2 [facility]           Subjective:      Patient ID: Thomas Horne is a 79 y.o. male.    Chief Complaint   Patient presents with   • Transition of Care Management     Lw cma         Vitals:  /72   Pulse 72   Temp 97.6 °F (36.4 °C) (Temporal)   Resp 16   Ht 6' 2\" (1.88 m)   Wt 106 kg (234 lb)   BMI 30.04 kg/m²     HPI  Patient was recently hospitalized for 2 days of dark stools and was on oral iron also. Hgb dropped to 7.4 and improved without any intervention and at discharge was 9. Had CBC today and was upto 10.1.   Had EGD done without any source of bleeding and was continued with aspirin and plavix was held.  Patient was having sob which improved with lasix and resumed torsemide on discharge. As per hospitalist notes, patient SOB with exertion was most likely from anemia rather than heart failure.  Patient has h/o lower leg arterial disease and stated that always has leg pain but from couple of days left lower leg pain is worse.   Due to follow up with nephrologist and cardiologist and will schedule that      ,  TCM Call     Date and time call was made  4/12/2024  8:42 AM    Hospital care reviewed  Records reviewed    Patient was hospitialized at  [facility]    Date of Admission  04/08/24    Date of discharge  04/11/24    Diagnosis  Shortness of breath    Disposition  Home    Were the patients medications reviewed and updated  No  He states he reviewed them at discharge and knows what he is taking but  knows to call if any questions    Current Symptoms  Shortness of breath    Shortness of breath severity  Mild      TCM Call     Post hospital issues  None    Should patient be enrolled in anticoag monitoring?  No    Scheduled for follow up?  Yes    Patients specialists  Cardiologist; Nephrologist; " Other (comment)    Cardiologist name  Dr Bell     Other specialists contcat #  Sleep Lab    Did you obtain your prescribed medications  Yes    Do you need help managing your prescriptions or medications  No    Is transportation to your appointment needed  No    I have advised the patient to call PCP with any new or worsening symptoms  Orlin Forbes    Living Arrangements  Spouse or Significiant other    Support System  Family    The type of support provided  Physical; Emotional    Do you have social support  Yes, as much as I need    Are you recieving any outpatient services  No    Are you recieving home care services  No    Are you using any community resources  No    Current waiver services  No    Have you fallen in the last 12 months  No    Interperter language line needed  No    Counseling  Patient    Counseling topics  Activities of daily living; Importance of RX compliance; patient and family education; instructions for management; Risk factor reduction    Comments  Thomas states that he has only mild shortness of breath. He knows to go to the ER if any chest pain, dyspnea, weakness, etc and to monitor his blood sugars, etc Orlin Forbes              PHQ-2/9 Depression Screening    Little interest or pleasure in doing things: 1 - several days  Feeling down, depressed, or hopeless: 3 - nearly every day  Trouble falling or staying asleep, or sleeping too much: 0 - not at all  Feeling tired or having little energy: 3 - nearly every day  Poor appetite or overeatin - not at all  Feeling bad about yourself - or that you are a failure or have let yourself or your family down: 0 - not at all  Trouble concentrating on things, such as reading the newspaper or watching television: 0 - not at all  Moving or speaking so slowly that other people could have noticed. Or the opposite - being so fidgety or restless that you have been moving around a lot more than usual: 0 - not at all  Thoughts that you would be better off  dead, or of hurting yourself in some way: 0 - not at all             The following portions of the patient's history were reviewed and updated as appropriate: allergies, current medications, past family history, past medical history, past social history, past surgical history and problem list.      Review of Systems   HENT: Negative.     Respiratory:  Positive for shortness of breath (stated that same since hospitalization). Negative for apnea, cough, choking, chest tightness, wheezing and stridor.    Cardiovascular:  Positive for leg swelling. Negative for chest pain and palpitations.   Musculoskeletal:  Positive for myalgias.        As noted in HPI             Objective:    Social History     Tobacco Use   Smoking Status Former   • Current packs/day: 0.00   • Average packs/day: 1.5 packs/day for 33.0 years (49.5 ttl pk-yrs)   • Types: Cigarettes   • Start date:    • Quit date:    • Years since quittin.3   • Passive exposure: Past   Smokeless Tobacco Never       Allergies:   Allergies   Allergen Reactions   • Lisinopril Rash and Lip Swelling         Current Outpatient Medications   Medication Sig Dispense Refill   • acetaminophen (TYLENOL) 325 mg tablet Take 2 tablets (650 mg total) by mouth every 6 (six) hours as needed for mild pain  0   • amLODIPine (NORVASC) 10 mg tablet Take 0.5 tablets (5 mg total) by mouth daily 1 tablet 1   • aspirin 81 mg chewable tablet Chew 81 mg daily     • atorvastatin (LIPITOR) 40 mg tablet Take 1 tablet (40 mg total) by mouth daily (Patient taking differently: Take 40 mg by mouth every evening) 90 tablet 3   • Blood Glucose Monitoring Suppl (OneTouch Verio Reflect) w/Device KIT Check blood sugars twice daily. Please substitute with appropriate alternative as covered by patient's insurance. Dx: E11.65 1 kit 0   • cloNIDine (CATAPRES) 0.1 mg tablet take 1 tablet by mouth every 12 hours 180 tablet 1   • Droplet Pen Needles 32G X 4 MM MISC USE EVERY EVENING 100 each 5   •  Empagliflozin 25 MG TABS Take 1 tablet (25 mg total) by mouth daily 90 tablet 1   • fenofibrate 160 MG tablet Take 1 tablet (160 mg total) by mouth daily 90 tablet 3   • ferrous sulfate 324 (65 Fe) mg Take 1 tablet (324 mg total) by mouth 2 (two) times a day before meals 180 tablet 0   • gabapentin (NEURONTIN) 600 MG tablet TAKE 1 TABLET BY MOUTH  TWICE DAILY 180 tablet 3   • glimepiride (AMARYL) 2 mg tablet Take 1 tablet (2 mg total) by mouth 2 (two) times a day 180 tablet 3   • glucose blood (OneTouch Verio) test strip TEST TWICE A  strip 5   • insulin glargine (LANTUS) 100 units/mL subcutaneous injection Inject 15 Units under the skin daily at bedtime 10 mL 0   • isosorbide mononitrate (IMDUR) 60 mg 24 hr tablet Take 1 tablet (60 mg total) by mouth daily 90 tablet 3   • metFORMIN (GLUCOPHAGE) 500 mg tablet Take 500 mg by mouth 2 (two) times a day with meals     • metoprolol tartrate (LOPRESSOR) 50 mg tablet Take 0.5 tablets (25 mg total) by mouth every 12 (twelve) hours 30 tablet 0   • Multiple Vitamins-Minerals (MULTIVITAMIN MEN 50+ PO) Take by mouth daily     • nitroglycerin (NITROSTAT) 0.4 mg SL tablet Place 1 tablet (0.4 mg total) under the tongue every 5 (five) minutes as needed for chest pain 30 tablet 3   • Omega-3 Fatty Acids (fish oil) 1,000 mg Take 4,000 mg by mouth 2 (two) times a day     • ondansetron (ZOFRAN) 4 mg tablet Take 4 mg by mouth every 8 (eight) hours as needed for nausea or vomiting     • OneTouch Delica Lancets 33G MISC Check blood sugars twice daily. Please substitute with appropriate alternative as covered by patient's insurance. Dx: E11.65 200 each 3   • pantoprazole (PROTONIX) 40 mg tablet Take 1 tablet (40 mg total) by mouth 2 (two) times a day (Patient taking differently: Take 40 mg by mouth daily) 180 tablet 0   • ranolazine (RANEXA) 500 mg 12 hr tablet take 1 tablet by mouth twice a day 60 tablet 3   • sitaGLIPtin (JANUVIA) 100 mg tablet Take 1 tablet (100 mg total) by  mouth daily 90 tablet 1   • torsemide (DEMADEX) 20 mg tablet Take 1 tablet (20 mg total) by mouth daily 90 tablet 3   • Elastic Bandages & Supports (Medical Compression Stockings) MISC Use daily Knee High 15-20mmHg (Patient not taking: Reported on 2/1/2024) 2 each 4     No current facility-administered medications for this visit.          Physical Exam  Constitutional:       Appearance: Normal appearance.   HENT:      Head: Normocephalic.      Nose: Nose normal.   Eyes:      Conjunctiva/sclera: Conjunctivae normal.   Cardiovascular:      Rate and Rhythm: Normal rate and regular rhythm.      Pulses:           Dorsalis pedis pulses are 1+ on the left side.      Heart sounds: Murmur heard.   Pulmonary:      Effort: Pulmonary effort is normal.      Breath sounds: Normal breath sounds.   Musculoskeletal:         General: Swelling present. No tenderness.      Comments: Left calf tenderness and area is mildly swollen and pedal pulses are weak and as has history of weak pulses as per patient   Skin:     General: Skin is warm and dry.      Findings: No rash.   Neurological:      Mental Status: He is alert and oriented to person, place, and time.   Psychiatric:         Mood and Affect: Mood normal.         Behavior: Behavior normal.         Thought Content: Thought content normal.         Judgment: Judgment normal.                     APOLLO Hoff

## 2024-04-18 NOTE — TELEPHONE ENCOUNTER
Please create anti coagulation episode.  Diagnosis - acute DVT  Goal :2 to 3  Do not call patient and  I told him to take 3 mg coumadin daily and will do INR On 4/22/24. APOLLO Hoff

## 2024-04-19 ENCOUNTER — TELEPHONE (OUTPATIENT)
Dept: NEPHROLOGY | Facility: CLINIC | Age: 79
End: 2024-04-19

## 2024-04-19 ENCOUNTER — ANTICOAG VISIT (OUTPATIENT)
Dept: FAMILY MEDICINE CLINIC | Facility: CLINIC | Age: 79
End: 2024-04-19

## 2024-04-19 ENCOUNTER — TELEPHONE (OUTPATIENT)
Dept: GASTROENTEROLOGY | Facility: CLINIC | Age: 79
End: 2024-04-19

## 2024-04-19 DIAGNOSIS — I82.452 ACUTE DEEP VEIN THROMBOSIS (DVT) OF LEFT PERONEAL VEIN (HCC): Primary | ICD-10-CM

## 2024-04-19 DIAGNOSIS — R79.89 ELEVATED SERUM CREATININE: Primary | ICD-10-CM

## 2024-04-19 PROCEDURE — 93971 EXTREMITY STUDY: CPT | Performed by: SURGERY

## 2024-04-19 NOTE — TELEPHONE ENCOUNTER
Advised pt that labs of 4/17/24 show that CR slightly higher than usual.  Labs reviewed from the hospital show that patient is iron deficient.  Per Dr. Fischer, Venofer infusions scheduled at [facility] 4/24/24,5/1/24 and 5/8/24.  Repeat BMP in four weeks (prior his May followup)  No change in medications.  Pt aware of above.      ----- Message from Jose Fischer MD sent at 4/18/2024  5:30 PM EDT -----  Please inform patient that most recent labs (4/17/24) show that creatinine is a little higher than usual. Let him know that I reviewed the testing in the hospital and it showed he was iron deficient.     Please have him:  - Go for Venofer 300 mg IV x 3 doses at the infusion center.   - Repeat a BMP in 4 weeks (prior to May appt with Graciela).   - No change to medications.

## 2024-04-19 NOTE — TELEPHONE ENCOUNTER
----- Message from Whitley Bhakta sent at 4/18/2024  4:14 PM EDT -----    ----- Message -----  From: Tj Paulino PA-C  Sent: 4/18/2024   4:06 PM EDT  To: [facility]    History of acute on chronic anemia.   Recent Colonoscopy 2/2024, EGD 4/2024.    Now with DVT and need to start anticoagulation.    Should have a capsule endoscopy.    I placed the order and called the patient.   He is OK with proceeding with this.  Would be done at [place]

## 2024-04-19 NOTE — TELEPHONE ENCOUNTER
I called pt to schedule capsule endoscopy. He stated that he is having second thoughts about getting the procedure done. He asked what this would involve and if he had to go through what a colonoscopy is. I reviewed with the pt what a capsule endoscopy was and the preparation. He stated that he would like a call back from Tj or another provider to go in depth of what this procedure is and if there is any other options besides the capsule endoscopy. Please advise, and let me know if there is anything else I can do. Thank you!

## 2024-04-22 ENCOUNTER — LAB (OUTPATIENT)
Dept: LAB | Facility: CLINIC | Age: 79
End: 2024-04-22
Payer: COMMERCIAL

## 2024-04-22 ENCOUNTER — TELEPHONE (OUTPATIENT)
Dept: INFUSION CENTER | Facility: HOSPITAL | Age: 79
End: 2024-04-22

## 2024-04-22 DIAGNOSIS — I82.452 ACUTE DEEP VEIN THROMBOSIS (DVT) OF LEFT PERONEAL VEIN (HCC): ICD-10-CM

## 2024-04-22 LAB
INR PPP: 1.8 (ref 0.84–1.19)
PROTHROMBIN TIME: 20.6 SECONDS (ref 11.6–14.5)

## 2024-04-22 PROCEDURE — 85610 PROTHROMBIN TIME: CPT

## 2024-04-22 PROCEDURE — 36415 COLL VENOUS BLD VENIPUNCTURE: CPT

## 2024-04-23 ENCOUNTER — ANTICOAG VISIT (OUTPATIENT)
Dept: FAMILY MEDICINE CLINIC | Facility: CLINIC | Age: 79
End: 2024-04-23

## 2024-04-23 ENCOUNTER — OFFICE VISIT (OUTPATIENT)
Dept: NEPHROLOGY | Facility: CLINIC | Age: 79
End: 2024-04-23
Payer: COMMERCIAL

## 2024-04-23 VITALS
HEART RATE: 58 BPM | OXYGEN SATURATION: 96 % | HEIGHT: 74 IN | BODY MASS INDEX: 30.03 KG/M2 | SYSTOLIC BLOOD PRESSURE: 128 MMHG | DIASTOLIC BLOOD PRESSURE: 60 MMHG | WEIGHT: 234 LBS

## 2024-04-23 DIAGNOSIS — D50.9 IRON DEFICIENCY ANEMIA, UNSPECIFIED IRON DEFICIENCY ANEMIA TYPE: ICD-10-CM

## 2024-04-23 DIAGNOSIS — N18.32 STAGE 3B CHRONIC KIDNEY DISEASE (HCC): Primary | ICD-10-CM

## 2024-04-23 DIAGNOSIS — D50.0 IRON DEFICIENCY ANEMIA DUE TO CHRONIC BLOOD LOSS: ICD-10-CM

## 2024-04-23 DIAGNOSIS — M89.9 CHRONIC KIDNEY DISEASE-MINERAL AND BONE DISORDER: ICD-10-CM

## 2024-04-23 DIAGNOSIS — E83.9 CHRONIC KIDNEY DISEASE-MINERAL AND BONE DISORDER: ICD-10-CM

## 2024-04-23 DIAGNOSIS — I50.32 CHRONIC HEART FAILURE WITH PRESERVED EJECTION FRACTION (HFPEF) (HCC): ICD-10-CM

## 2024-04-23 DIAGNOSIS — I10 PRIMARY HYPERTENSION: ICD-10-CM

## 2024-04-23 DIAGNOSIS — N18.9 CHRONIC KIDNEY DISEASE-MINERAL AND BONE DISORDER: ICD-10-CM

## 2024-04-23 PROCEDURE — 99214 OFFICE O/P EST MOD 30 MIN: CPT | Performed by: INTERNAL MEDICINE

## 2024-04-23 NOTE — TELEPHONE ENCOUNTER
I spoke the patient at length explaining the capsule endoscopy procedure which would involve likely a half of a bowel prep the night before the procedure and then going to one of the facilities where he would put on the  belt and swallow the capsule pill.  He would then return to that facility the next day to return the belt.  And that if anything were found on the images that were taken we could consider an upper or lower balloon enteroscopy if needed.  As his hemoglobin is stable at this time and he will have some iron infusions coming up he would like to hold off on this procedure for now and see how things go.  If he were to show signs of recurrent bleeding or significant drop in hemoglobin he would let us know for reconsideration for capsule endoscopy.  He also understands that if it gets beyond 1 year from his last EGD and colon that they would ask for that to be repeated before we would do the capsule endoscopy.  He is in agreement with this plan and will let us know if anything changes.

## 2024-04-23 NOTE — PATIENT INSTRUCTIONS
Your kidney function looks a little worse than usual - this may be related to the low blood count.   Please go for the iron infusions weekly for 3 doses starting tomorrow.   3 weeks after the last iron infusion, go for repeat blood work.   No change to your BP medications.   Follow up in 3-4 months.

## 2024-04-23 NOTE — PROGRESS NOTES
NEPHROLOGY OFFICE PROGRESS NOTE   Thomas Horne 79 y.o. male MRN: [id number]  DATE: 04/23/24  Reason for visit: Continued evaluation and management of CKD    ASSESSMENT & PLAN:  Chronic kidney disease, stage IIIB:  Thomas's baseline serum creatinine is around 1.6 to 1.8.   The etiology of his CKD is multifactorial - hypertensive nephrosclerosis, diabetic kidney disease and cardiorenal syndrome.   Creatinine is slightly above baseline.  2.07 on April 17, 2024.  This could be hemodynamic in origin due to his ongoing issues with anemia.  Repeat lab work in 6 weeks.  Treatment: good BP control, glycemic control, volume control.     Hypertension:  Home machine reads +10 higher than the office BP cuff.   Office BP is at goal (<130/80)  Current medications: Torsemide 20 mg OD, Metoprolol tartrate 25 mg BID, Clonidine 0.1 mg BID, ISMN 60 mg daily, Amlodipine 5 mg daily.   Losartan 100 mg daily on hold since June 2023 hospitalization.   No changes.     Congestive heart failure:  Clinically compensated.   Rx: Torsemide 20 mg daily.  No changes.     Iron deficiency anemia:  Hgb stable at 10.1.   Isat 5% and Ferritin 10.   We have arranged for Venofer 300 mg IV x 3 doses over the next 3 weeks.  Recheck CBC and iron panel 3 weeks after last Venofer dose.  He may need additional doses of Venofer.  Seeing hematology in May 2024.     Mineral bone disease:  PTH is at goal in April 2024  Calcium and phosphorus are at goal.  Vitamin D level is at goal in April 2024.     Proteinuria:  Stable.   No compelling need for ARB at this time.   Monitor off Losartan.     L leg DVT  Now on anticoagulation with Coumadin.       Peripheral arterial disease  S/p R LE bypass in Nov 2023.      Diabetes mellitus:  Management is deferred to PCP.    Hyperlipidemia:  Per PCP.     Patient Instructions   Your kidney function looks a little worse than usual - this may be related to the low blood count.   Please go for the iron infusions weekly for 3 doses  starting tomorrow.   3 weeks after the last iron infusion, go for repeat blood work.   No change to your BP medications.   Follow up in 3-4 months.     SUBJECTIVE / INTERVAL HISTORY:  Thomas was last seen in the office in Oct 2023.   Since his last office visit, he has been admitted multiple times to the hospital.    First admission: November 16 to November 21 at [facility].  This was an elective admission for scheduled right lower extremity bypass.  Renal function was stable during the hospital stay with a creatinine of 1.19-1.57.  He was anemic during the hospitalization which was felt to be due to acute blood loss anemia.    Second admission: December 25 to December 27, 2023 to [facility] with symptomatic anemia.  Hemoglobin 6.9 on admission. he recalls receiving PRBC and IV iron.  Renal function was fairly stable with creatinine of 1.58-1.93.    Third admission: April 8 to April 11, 2024 at [facility] for shortness of breath due to symptomatic anemia.  He underwent EGD without any clear source of bleeding.  Plavix was discontinued during the hospital stay.    Of note, he also underwent a colonoscopy in February 2024.  There was no obvious source of anemia noted during the colonoscopy.    He was seen at his PCPs office after discharge from the April 2024 admission.  He was sent for a venous duplex and was found to have a DVT on his left leg.  He is now being anticoagulated with Coumadin.    Not checking BP at home.  He reports fatigue and shortness of breath on exertion.    BP cuff calibration July 26, 2023:  Home BP cuff 169/77  Office BP cuff 158/80    PMH/PSH: HTN, DM, HLP, CAD s/p CABG 2002, s/p PCI, prostate cancer s/p prostatectomy, AAA s/p EVAR, nephrolithiasis, DDD s/p laminectomy, cholecystectomy    Previous work up:   8/2/22 CT abdomen: 1 or more simple renal cysts. No hydronephrosis. Parenchymal thinning/scar at the R inferior pole.     6/17/23 echocardiogram: Mild to moderate  "LVH, EF 50%, G2DD    8/8/23 Renin 1.290, Aldosterone 4.3, Metanephrines 21.1, Normetanephrines 95.1.     ALLERGIES:   Allergies   Allergen Reactions    Lisinopril Rash and Lip Swelling     REVIEW OF SYSTEMS:  Review of Systems   Constitutional:  Negative for appetite change, chills, fatigue and fever.   Respiratory:  Positive for cough and shortness of breath (on exertion).    Cardiovascular:  Negative for chest pain and leg swelling.   Gastrointestinal:  Negative for abdominal pain, diarrhea, nausea and vomiting.   Genitourinary:  Negative for dysuria and hematuria.   Musculoskeletal:  Negative for arthralgias and back pain.   Neurological:  Positive for light-headedness. Negative for dizziness.     OBJECTIVE:  /60 (BP Location: Left arm, Patient Position: Sitting, Cuff Size: Large)   Pulse 58   Ht 6' 2\" (1.88 m)   Wt 106 kg (234 lb)   SpO2 96%   BMI 30.04 kg/m²   Current Weight:   Body mass index is 30.04 kg/m².  Physical Exam  Constitutional:       General: He is not in acute distress.     Appearance: He is well-developed. He is not ill-appearing, toxic-appearing or diaphoretic.   HENT:      Head: Normocephalic and atraumatic.   Eyes:      General: No scleral icterus.  Neck:      Vascular: No JVD.   Cardiovascular:      Rate and Rhythm: Normal rate and regular rhythm.      Heart sounds: Murmur heard.   Pulmonary:      Effort: Pulmonary effort is normal. No respiratory distress.      Breath sounds: Normal breath sounds.   Abdominal:      General: Bowel sounds are normal.      Palpations: Abdomen is soft.   Musculoskeletal:      Cervical back: Neck supple.      Right lower leg: No edema.      Left lower leg: No edema.   Skin:     General: Skin is warm and dry.   Neurological:      Mental Status: He is alert and oriented to person, place, and time.   Psychiatric:         Behavior: Behavior normal.       Medications:  Current Outpatient Medications:     acetaminophen (TYLENOL) 325 mg tablet, Take 2 tablets " (650 mg total) by mouth every 6 (six) hours as needed for mild pain, Disp: , Rfl: 0    amLODIPine (NORVASC) 10 mg tablet, Take 0.5 tablets (5 mg total) by mouth daily, Disp: 1 tablet, Rfl: 1    aspirin 81 mg chewable tablet, Chew 81 mg daily, Disp: , Rfl:     atorvastatin (LIPITOR) 40 mg tablet, Take 1 tablet (40 mg total) by mouth daily (Patient taking differently: Take 40 mg by mouth every evening), Disp: 90 tablet, Rfl: 3    Blood Glucose Monitoring Suppl (OneTouch Verio Reflect) w/Device KIT, Check blood sugars twice daily. Please substitute with appropriate alternative as covered by patient's insurance. Dx: E11.65, Disp: 1 kit, Rfl: 0    cloNIDine (CATAPRES) 0.1 mg tablet, take 1 tablet by mouth every 12 hours, Disp: 180 tablet, Rfl: 1    Droplet Pen Needles 32G X 4 MM MISC, USE EVERY EVENING, Disp: 100 each, Rfl: 5    Elastic Bandages & Supports (Medical Compression Stockings) MISC, Use daily Knee High 15-20mmHg (Patient not taking: Reported on 2/1/2024), Disp: 2 each, Rfl: 4    Empagliflozin 25 MG TABS, Take 1 tablet (25 mg total) by mouth daily, Disp: 90 tablet, Rfl: 1    fenofibrate 160 MG tablet, Take 1 tablet (160 mg total) by mouth daily, Disp: 90 tablet, Rfl: 3    ferrous sulfate 324 (65 Fe) mg, Take 1 tablet (324 mg total) by mouth 2 (two) times a day before meals, Disp: 180 tablet, Rfl: 0    gabapentin (NEURONTIN) 600 MG tablet, TAKE 1 TABLET BY MOUTH  TWICE DAILY, Disp: 180 tablet, Rfl: 3    glimepiride (AMARYL) 2 mg tablet, Take 1 tablet (2 mg total) by mouth 2 (two) times a day, Disp: 180 tablet, Rfl: 3    glucose blood (OneTouch Verio) test strip, TEST TWICE A DAY, Disp: 200 strip, Rfl: 5    insulin glargine (LANTUS) 100 units/mL subcutaneous injection, Inject 15 Units under the skin daily at bedtime, Disp: 10 mL, Rfl: 0    isosorbide mononitrate (IMDUR) 60 mg 24 hr tablet, Take 1 tablet (60 mg total) by mouth daily, Disp: 90 tablet, Rfl: 3    metFORMIN (GLUCOPHAGE) 500 mg tablet, Take 500 mg by  mouth 2 (two) times a day with meals, Disp: , Rfl:     metoprolol tartrate (LOPRESSOR) 50 mg tablet, Take 0.5 tablets (25 mg total) by mouth every 12 (twelve) hours, Disp: 30 tablet, Rfl: 0    Multiple Vitamins-Minerals (MULTIVITAMIN MEN 50+ PO), Take by mouth daily, Disp: , Rfl:     nitroglycerin (NITROSTAT) 0.4 mg SL tablet, Place 1 tablet (0.4 mg total) under the tongue every 5 (five) minutes as needed for chest pain, Disp: 30 tablet, Rfl: 3    Omega-3 Fatty Acids (fish oil) 1,000 mg, Take 4,000 mg by mouth 2 (two) times a day, Disp: , Rfl:     ondansetron (ZOFRAN) 4 mg tablet, Take 4 mg by mouth every 8 (eight) hours as needed for nausea or vomiting, Disp: , Rfl:     OneTouch Delica Lancets 33G MISC, Check blood sugars twice daily. Please substitute with appropriate alternative as covered by patient's insurance. Dx: E11.65, Disp: 200 each, Rfl: 3    pantoprazole (PROTONIX) 40 mg tablet, Take 1 tablet (40 mg total) by mouth 2 (two) times a day (Patient taking differently: Take 40 mg by mouth daily), Disp: 180 tablet, Rfl: 0    ranolazine (RANEXA) 500 mg 12 hr tablet, take 1 tablet by mouth twice a day, Disp: 60 tablet, Rfl: 3    sitaGLIPtin (JANUVIA) 100 mg tablet, Take 1 tablet (100 mg total) by mouth daily, Disp: 90 tablet, Rfl: 1    torsemide (DEMADEX) 20 mg tablet, Take 1 tablet (20 mg total) by mouth daily, Disp: 90 tablet, Rfl: 3    warfarin (Coumadin) 1 mg tablet, Take 1 tablet (1 mg total) by mouth daily, Disp: 90 tablet, Rfl: 0    warfarin (Coumadin) 3 mg tablet, Take 1 tablet (3 mg total) by mouth daily, Disp: 90 tablet, Rfl: 0    Laboratory Results:  Lab Results   Component Value Date    WBC 8.20 04/17/2024    HGB 10.1 (L) 04/17/2024    HCT 36.0 (L) 04/17/2024    MCV 72 (L) 04/17/2024     04/17/2024     Lab Results   Component Value Date    SODIUM 140 04/17/2024    K 4.0 04/17/2024    CL 99 04/17/2024    CO2 31 04/17/2024    BUN 44 (H) 04/17/2024    CREATININE 2.07 (H) 04/17/2024    GLUC 114  04/11/2024    CALCIUM 9.3 04/17/2024

## 2024-04-24 ENCOUNTER — TELEPHONE (OUTPATIENT)
Age: 79
End: 2024-04-24

## 2024-04-24 ENCOUNTER — HOSPITAL ENCOUNTER (OUTPATIENT)
Dept: INFUSION CENTER | Facility: HOSPITAL | Age: 79
Discharge: HOME | End: 2024-04-24
Attending: INTERNAL MEDICINE
Payer: COMMERCIAL

## 2024-04-24 VITALS
HEART RATE: 72 BPM | RESPIRATION RATE: 18 BRPM | OXYGEN SATURATION: 94 % | TEMPERATURE: 97.4 F | SYSTOLIC BLOOD PRESSURE: 123 MMHG | DIASTOLIC BLOOD PRESSURE: 59 MMHG

## 2024-04-24 DIAGNOSIS — D50.0 IRON DEFICIENCY ANEMIA DUE TO CHRONIC BLOOD LOSS: Primary | ICD-10-CM

## 2024-04-24 PROCEDURE — 96365 THER/PROPH/DIAG IV INF INIT: CPT

## 2024-04-24 RX ORDER — SODIUM CHLORIDE 9 MG/ML
20 INJECTION, SOLUTION INTRAVENOUS ONCE
Status: COMPLETED | OUTPATIENT
Start: 2024-04-24 | End: 2024-04-24

## 2024-04-24 RX ORDER — SODIUM CHLORIDE 9 MG/ML
20 INJECTION, SOLUTION INTRAVENOUS ONCE
Status: CANCELLED | OUTPATIENT
Start: 2024-05-01

## 2024-04-24 RX ADMIN — SODIUM CHLORIDE 20 ML/HR: 0.9 INJECTION, SOLUTION INTRAVENOUS at 14:59

## 2024-04-24 RX ADMIN — IRON SUCROSE 300 MG: 20 INJECTION, SOLUTION INTRAVENOUS at 14:59

## 2024-04-24 NOTE — TELEPHONE ENCOUNTER
Rebecca from the [facility] called.    Patient is there now for his infusion.    He is questioning if he is to continue taking his oral iron supplement or if that should be stopped now that he is getting infusion.    Please advise.    Patient asking for call back regarding this.

## 2024-04-24 NOTE — PROGRESS NOTES
Thomas Horne  tolerated treatment well with no complications.      Thomas Horne is aware of future appt on 5/1/24 at 1430.     AVS printed and given to Thomas Horne:    No (Declined by Thomas Horne) x

## 2024-04-25 ENCOUNTER — LAB (OUTPATIENT)
Dept: LAB | Facility: CLINIC | Age: 79
End: 2024-04-25
Payer: COMMERCIAL

## 2024-04-25 DIAGNOSIS — I82.452 ACUTE DEEP VEIN THROMBOSIS (DVT) OF LEFT PERONEAL VEIN (HCC): ICD-10-CM

## 2024-04-25 LAB
INR PPP: 2.18 (ref 0.84–1.19)
PROTHROMBIN TIME: 23.9 SECONDS (ref 11.6–14.5)

## 2024-04-25 PROCEDURE — 85610 PROTHROMBIN TIME: CPT

## 2024-04-25 PROCEDURE — 36415 COLL VENOUS BLD VENIPUNCTURE: CPT

## 2024-04-27 DIAGNOSIS — K92.2 GI BLEED: ICD-10-CM

## 2024-04-27 DIAGNOSIS — D50.0 IRON DEFICIENCY ANEMIA DUE TO CHRONIC BLOOD LOSS: ICD-10-CM

## 2024-04-27 DIAGNOSIS — D64.9 SYMPTOMATIC ANEMIA: ICD-10-CM

## 2024-04-27 DIAGNOSIS — K92.1 MELENA: ICD-10-CM

## 2024-04-28 RX ORDER — FERROUS SULFATE 324(65)MG
324 TABLET, DELAYED RELEASE (ENTERIC COATED) ORAL
Qty: 180 TABLET | Refills: 1 | Status: SHIPPED | OUTPATIENT
Start: 2024-04-28 | End: 2024-10-25

## 2024-04-30 ENCOUNTER — TELEPHONE (OUTPATIENT)
Age: 79
End: 2024-04-30

## 2024-04-30 NOTE — TELEPHONE ENCOUNTER
And do not forget his aspirin.  I do not disagree with what he is planning and would likely do the same thing

## 2024-04-30 NOTE — TELEPHONE ENCOUNTER
That would certainly be better if he must fly getting up every 15 to thirty minutes to walk around

## 2024-04-30 NOTE — TELEPHONE ENCOUNTER
Patient would like to know if he got up during th flight roughly every 30 mins or so if this would be acceptable?   Please advise     Sultana Austin CMA

## 2024-04-30 NOTE — TELEPHONE ENCOUNTER
Patient called and stated he was seen at the office on 4/18 and was diagnosed with a DVT,patient wanted to know is it safe to fly patient is traveling on a plane 5/9.

## 2024-04-30 NOTE — TELEPHONE ENCOUNTER
Called and spoke w patient, informed him of below message. He stated his granddaughter is graduating and his options are flying to [place] or driving. He stated he will make sure to get up every 15 minutes on the plane to keep the blood circulating in his legs.   Betty Herman, CMA

## 2024-04-30 NOTE — TELEPHONE ENCOUNTER
Generally speaking pt's with clots should not be sitting stagnet, with legs bent in one position for long periods of time

## 2024-05-01 ENCOUNTER — OFFICE VISIT (OUTPATIENT)
Dept: HEMATOLOGY ONCOLOGY | Facility: MEDICAL CENTER | Age: 79
End: 2024-05-01
Payer: COMMERCIAL

## 2024-05-01 ENCOUNTER — HOSPITAL ENCOUNTER (OUTPATIENT)
Dept: INFUSION CENTER | Facility: HOSPITAL | Age: 79
Discharge: HOME | End: 2024-05-01
Attending: INTERNAL MEDICINE
Payer: COMMERCIAL

## 2024-05-01 VITALS
HEART RATE: 48 BPM | TEMPERATURE: 97.6 F | BODY MASS INDEX: 30.93 KG/M2 | RESPIRATION RATE: 17 BRPM | HEIGHT: 74 IN | OXYGEN SATURATION: 99 % | DIASTOLIC BLOOD PRESSURE: 62 MMHG | WEIGHT: 241 LBS | SYSTOLIC BLOOD PRESSURE: 136 MMHG

## 2024-05-01 VITALS
RESPIRATION RATE: 18 BRPM | DIASTOLIC BLOOD PRESSURE: 67 MMHG | HEART RATE: 58 BPM | OXYGEN SATURATION: 96 % | TEMPERATURE: 97 F | SYSTOLIC BLOOD PRESSURE: 154 MMHG

## 2024-05-01 DIAGNOSIS — I82.452 ACUTE DEEP VEIN THROMBOSIS (DVT) OF LEFT PERONEAL VEIN (HCC): ICD-10-CM

## 2024-05-01 DIAGNOSIS — N18.32 STAGE 3B CHRONIC KIDNEY DISEASE (HCC): ICD-10-CM

## 2024-05-01 DIAGNOSIS — D50.0 IRON DEFICIENCY ANEMIA DUE TO CHRONIC BLOOD LOSS: Primary | ICD-10-CM

## 2024-05-01 PROCEDURE — 96366 THER/PROPH/DIAG IV INF ADDON: CPT

## 2024-05-01 PROCEDURE — 96365 THER/PROPH/DIAG IV INF INIT: CPT

## 2024-05-01 PROCEDURE — 99204 OFFICE O/P NEW MOD 45 MIN: CPT | Performed by: PHYSICIAN ASSISTANT

## 2024-05-01 RX ORDER — SODIUM CHLORIDE 9 MG/ML
20 INJECTION, SOLUTION INTRAVENOUS ONCE
Status: CANCELLED | OUTPATIENT
Start: 2024-05-07

## 2024-05-01 RX ORDER — SODIUM CHLORIDE 9 MG/ML
20 INJECTION, SOLUTION INTRAVENOUS ONCE
Status: COMPLETED | OUTPATIENT
Start: 2024-05-01 | End: 2024-05-01

## 2024-05-01 RX ADMIN — IRON SUCROSE 300 MG: 20 INJECTION, SOLUTION INTRAVENOUS at 11:13

## 2024-05-01 RX ADMIN — SODIUM CHLORIDE 20 ML/HR: 0.9 INJECTION, SOLUTION INTRAVENOUS at 11:15

## 2024-05-01 NOTE — PROGRESS NOTES
[facility]  [facility] [address]  Hematology Ambulatory Consult  Thomas Horne, 1945, [id number]  5/1/2024      Assessment and Plan   1. Iron deficiency anemia due to chronic blood loss  Patient presents today for evaluation of anemia, likely secondary to chronic blood loss along with Stage III CKD.    Patient with iron deficiency anemia with history of GI bleeding. He had recent EGD and colonoscopy without evidence of bleeding.  He was recently started on Venofer 300 mg x 3 by nephrology.    He continues to follow with GI. He says that capsule endoscopy is recommended.  I do agree that he should have that completed.    He is maintained on baby aspirin due to history of CABG.  He is also on Coumadin now as well.  He is aware that he may have increased evidence of bleeding/ drop in H/H now that he is on both of these medications.    He also has history of stage III chronic kidney disease which also may be contributing to his anemia.  We briefly discussed that we sometimes consider utilizing Procrit for patient who have hemoglobin that is persistently less than 10 g/dL even after iron stores have been repleted.    2. DVT  Patient with recent finding of acute nonocclusive DVT in the gastrocnemius and peroneal veins in the left lower extremity. ?Provoked by hospital stay from 4/8 through 4/11/2024 for SOB.  He also has distant history of DVT.  Details are unclear.    He was placed on Coumadin on 4/18/2024.  This is being managed by his PCP.    The patient is scheduled for follow-up in approximately 3 months with Dr. Main.     Patient voiced agreement and understanding to the above.   Patient knows to call the Hematology/Oncology office with any questions and concerns regarding the above.    Barrier(s) to care: None.   The patient is able to self care.    Subjective     Chief Complaint   Patient presents with    Consult    Iron  deficiency       Referring provider    Frank Lombardi, DO  [address] [facility]  [address]  [place] [address]    History of present illness:   Patient is an 79-year-old who presents for evaluation of anemia.    Dating back to October 2023 his hemoglobin was 14.7.    He presented to the ED on 12/25/2023 with symptomatic hemoglobin of 6.9 in the setting of melena.    He had EGD which showed multiple small clean-based shallow superficial duodenal ulcers in the duodenal bulb.  In the setting of antiplatelet therapy this could be the source of anemia.  He received packed red blood cells along with IV iron while inpatient.    At the time he was on baby aspirin along with Plavix.  After his hospital stay he was continued on aspirin alone.    He had a colonoscopy on 2/14/2024.  He had 1 sessile polyp, less than 5 mm in the rectum, removed.  Few diverticula in the descending colon and sigmoid colon.  Internal hemorrhoids.    He had a repeat EGD on 4/10/2024.  This showed normal esophagus, stomach and duodenum.    Since November 2023 hemoglobin has fluctuated between about 7 and 10g/dL.  He had iron studies drawn on 4/8/2024. Ferritin was 10 and iron saturation was 5%.    He was recently started on Venofer 300 mg weekly x 3 by nephrology.    He has history of stage III chronic kidney disease and follows with nephrology.    Thomas states he feels fair.  He admits to fatigue and said that he is not able to be as active as he was previously.  He denies recent melena.  He denies bright red blood per rectum.  He denies nausea or vomiting.  He denies chest pain or shortness of breath.    More recently he developed some pain in his left ankle.    He had venous duplex on 4/18/2024 which showed acute nonocclusive DVT in the gastrocnemius and peroneal veins in the left lower extremity.  He has been started on Coumadin.      No provoking factors other than a hospital stay from 4/8 through 4/11 for shortness of breath.    He has very  distant history of previous DVT.    Review of Systems   Constitutional:  Positive for fatigue. Negative for activity change, appetite change and fever.   HENT:  Negative for nosebleeds.    Respiratory:  Negative for cough, choking and shortness of breath.    Cardiovascular:  Negative for chest pain, palpitations and leg swelling.   Gastrointestinal:  Negative for abdominal distention, abdominal pain, anal bleeding, blood in stool, constipation, diarrhea, nausea and vomiting.   Endocrine: Negative for cold intolerance.   Genitourinary:  Negative for hematuria.   Musculoskeletal:  Positive for arthralgias. Negative for myalgias.   Skin:  Negative for color change, pallor and rash.   Allergic/Immunologic: Negative for immunocompromised state.   Neurological:  Negative for headaches.   Hematological:  Negative for adenopathy. Does not bruise/bleed easily.   All other systems reviewed and are negative.      Past Medical History:   Diagnosis Date    Anemia     CAD (coronary artery disease)     Cancer (HCC)     prostate    CHF (congestive heart failure) (HCC)     Chronic kidney disease     Diabetes mellitus (HCC)     Duodenal ulcer     Hyperlipidemia     Hypertension     Myocardial infarction (HCC)     Neuropathy     Bilateral feet    Sleep apnea     Could not tolerate CPAP     Past Surgical History:   Procedure Laterality Date    ADENOIDECTOMY      CARDIAC CATHETERIZATION Left 10/19/2022    Procedure: Cardiac Left Heart Cath;  Surgeon: Danielle Pereira MD;  Location: [facility];  Service: Cardiology    CARDIAC SURGERY  2002    3 cardiac bypass then angioplasty 7/2020    CHOLECYSTECTOMY      COLONOSCOPY  02/14/2024    CORONARY ARTERY BYPASS GRAFT      IR LOWER EXTREMITY ANGIOGRAM  11/01/2023    LA BYPASS W/VEIN FEMORAL-POPLITEAL Right 11/16/2023    Procedure: BYPASS FEMORAL-POPLITEAL WITH CRYO VEIN, RIGHT FEMORAL ENDARTERECTOMY;  Surgeon: Vasquez Clark MD;  Location: [facility];  Service: Vascular     DE SLCTV CATHJ 3RD+ ORD SLCTV ABDL PEL/LXTR BRNCH Right 2023    Procedure: ARTERIOGRAM Right lower extremity arteriogram with CO2 via right groin access;  Surgeon: Vasquez Clark MD;  Location: [facility];  Service: Vascular    PROSTATE SURGERY      TONSILLECTOMY       Family History   Problem Relation Age of Onset    Diabetes Mother     Alcohol abuse Father     Mental illness Neg Hx      Social History     Socioeconomic History    Marital status:      Spouse name: Not on file    Number of children: 2    Years of education: Not on file    Highest education level: Some college, no degree   Occupational History    Not on file   Tobacco Use    Smoking status: Former     Current packs/day: 0.00     Average packs/day: 1.5 packs/day for 33.0 years (49.5 ttl pk-yrs)     Types: Cigarettes     Start date:      Quit date:      Years since quittin.3     Passive exposure: Past    Smokeless tobacco: Never   Vaping Use    Vaping status: Never Used   Substance and Sexual Activity    Alcohol use: Yes     Alcohol/week: 14.0 standard drinks of alcohol     Types: 14 Cans of beer per week     Comment: 1 -2 daily    Drug use: Never    Sexual activity: Not on file   Other Topics Concern    Not on file   Social History Narrative    Not on file     Social Determinants of Health     Financial Resource Strain: Low Risk  (10/31/2023)    Overall Financial Resource Strain (CARDIA)     Difficulty of Paying Living Expenses: Not hard at all   Food Insecurity: No Food Insecurity (2024)    Hunger Vital Sign     Worried About Running Out of Food in the Last Year: Never true     Ran Out of Food in the Last Year: Never true   Transportation Needs: No Transportation Needs (2024)    PRAPARE - Transportation     Lack of Transportation (Medical): No     Lack of Transportation (Non-Medical): No   Physical Activity: Not on file   Stress: Not on file   Social Connections: Not on file   Intimate Partner Violence:  Not on file   Housing Stability: Low Risk  (4/9/2024)    Housing Stability Vital Sign     Unable to Pay for Housing in the Last Year: No     Number of Places Lived in the Last Year: 1     Unstable Housing in the Last Year: No         Current Outpatient Medications:     acetaminophen (TYLENOL) 325 mg tablet, Take 2 tablets (650 mg total) by mouth every 6 (six) hours as needed for mild pain, Disp: , Rfl: 0    amLODIPine (NORVASC) 10 mg tablet, Take 0.5 tablets (5 mg total) by mouth daily, Disp: 1 tablet, Rfl: 1    aspirin 81 mg chewable tablet, Chew 81 mg daily, Disp: , Rfl:     atorvastatin (LIPITOR) 40 mg tablet, Take 1 tablet (40 mg total) by mouth daily (Patient taking differently: Take 40 mg by mouth every evening), Disp: 90 tablet, Rfl: 3    Blood Glucose Monitoring Suppl (OneTouch Verio Reflect) w/Device KIT, Check blood sugars twice daily. Please substitute with appropriate alternative as covered by patient's insurance. Dx: E11.65, Disp: 1 kit, Rfl: 0    cloNIDine (CATAPRES) 0.1 mg tablet, take 1 tablet by mouth every 12 hours, Disp: 180 tablet, Rfl: 1    Droplet Pen Needles 32G X 4 MM MISC, USE EVERY EVENING, Disp: 100 each, Rfl: 5    Elastic Bandages & Supports (Medical Compression Stockings) MISC, Use daily Knee High 15-20mmHg (Patient not taking: Reported on 2/1/2024), Disp: 2 each, Rfl: 4    Empagliflozin 25 MG TABS, Take 1 tablet (25 mg total) by mouth daily, Disp: 90 tablet, Rfl: 1    fenofibrate 160 MG tablet, Take 1 tablet (160 mg total) by mouth daily, Disp: 90 tablet, Rfl: 3    ferrous sulfate 324 (65 Fe) mg, Take 1 tablet (324 mg total) by mouth 2 (two) times a day before meals, Disp: 180 tablet, Rfl: 1    gabapentin (NEURONTIN) 600 MG tablet, TAKE 1 TABLET BY MOUTH  TWICE DAILY, Disp: 180 tablet, Rfl: 3    glimepiride (AMARYL) 2 mg tablet, Take 1 tablet (2 mg total) by mouth 2 (two) times a day, Disp: 180 tablet, Rfl: 3    glucose blood (OneTouch Verio) test strip, TEST TWICE A DAY, Disp: 200  strip, Rfl: 5    insulin glargine (LANTUS) 100 units/mL subcutaneous injection, Inject 15 Units under the skin daily at bedtime, Disp: 10 mL, Rfl: 0    isosorbide mononitrate (IMDUR) 60 mg 24 hr tablet, Take 1 tablet (60 mg total) by mouth daily, Disp: 90 tablet, Rfl: 3    metFORMIN (GLUCOPHAGE) 500 mg tablet, Take 500 mg by mouth 2 (two) times a day with meals, Disp: , Rfl:     metoprolol tartrate (LOPRESSOR) 50 mg tablet, Take 0.5 tablets (25 mg total) by mouth every 12 (twelve) hours, Disp: 30 tablet, Rfl: 0    Multiple Vitamins-Minerals (MULTIVITAMIN MEN 50+ PO), Take by mouth daily, Disp: , Rfl:     nitroglycerin (NITROSTAT) 0.4 mg SL tablet, Place 1 tablet (0.4 mg total) under the tongue every 5 (five) minutes as needed for chest pain, Disp: 30 tablet, Rfl: 3    Omega-3 Fatty Acids (fish oil) 1,000 mg, Take 4,000 mg by mouth 2 (two) times a day, Disp: , Rfl:     ondansetron (ZOFRAN) 4 mg tablet, Take 4 mg by mouth every 8 (eight) hours as needed for nausea or vomiting, Disp: , Rfl:     OneTouch Delica Lancets 33G MISC, Check blood sugars twice daily. Please substitute with appropriate alternative as covered by patient's insurance. Dx: E11.65, Disp: 200 each, Rfl: 3    pantoprazole (PROTONIX) 40 mg tablet, Take 1 tablet (40 mg total) by mouth 2 (two) times a day (Patient taking differently: Take 40 mg by mouth daily), Disp: 180 tablet, Rfl: 0    ranolazine (RANEXA) 500 mg 12 hr tablet, take 1 tablet by mouth twice a day, Disp: 60 tablet, Rfl: 3    sitaGLIPtin (JANUVIA) 100 mg tablet, Take 1 tablet (100 mg total) by mouth daily, Disp: 90 tablet, Rfl: 1    torsemide (DEMADEX) 20 mg tablet, Take 1 tablet (20 mg total) by mouth daily, Disp: 90 tablet, Rfl: 3    warfarin (Coumadin) 1 mg tablet, Take 1 tablet (1 mg total) by mouth daily, Disp: 90 tablet, Rfl: 0    warfarin (Coumadin) 3 mg tablet, Take 1 tablet (3 mg total) by mouth daily, Disp: 90 tablet, Rfl: 0  Allergies   Allergen Reactions    Lisinopril Rash and  "Lip Swelling       Objective   /62 (BP Location: Left arm, Patient Position: Sitting, Cuff Size: Adult)   Pulse (!) 48   Temp 97.6 °F (36.4 °C)   Resp 17   Ht 6' 2\" (1.88 m)   Wt 109 kg (241 lb)   SpO2 99%   BMI 30.94 kg/m²   Physical Exam  Constitutional:       General: He is not in acute distress.     Appearance: He is well-developed.   HENT:      Head: Normocephalic and atraumatic.   Eyes:      General: No scleral icterus.     Conjunctiva/sclera: Conjunctivae normal.   Cardiovascular:      Rate and Rhythm: Normal rate and regular rhythm.   Pulmonary:      Effort: Pulmonary effort is normal. No respiratory distress.      Breath sounds: Normal breath sounds.   Abdominal:      General: Bowel sounds are normal. There is no distension.      Palpations: Abdomen is soft.      Tenderness: There is no abdominal tenderness.   Musculoskeletal:         General: No tenderness.      Cervical back: Normal range of motion.   Skin:     Coloration: Skin is not pale.      Findings: No erythema or rash.   Neurological:      Mental Status: He is alert and oriented to person, place, and time.   Psychiatric:         Mood and Affect: Mood normal.         Thought Content: Thought content normal.         Judgment: Judgment normal.     Extremities: No lower extremity edema bilaterally.    Result Review  Labs:   Latest Reference Range & Units 04/11/24 05:13   Sodium 135 - 147 mmol/L 137   Potassium 3.5 - 5.3 mmol/L 3.9   Chloride 96 - 108 mmol/L 105   Carbon Dioxide 21 - 32 mmol/L 24   ANION GAP 4 - 13 mmol/L 8   BUN 5 - 25 mg/dL 25   Creatinine 0.60 - 1.30 mg/dL 1.51 (H)   GLUCOSE 65 - 140 mg/dL 114   Calcium 8.4 - 10.2 mg/dL 9.2   GFR, Calculated ml/min/1.73sq m 42   WBC 4.31 - 10.16 Thousand/uL 6.77   RBC 3.88 - 5.62 Million/uL 4.56   Hemoglobin 12.0 - 17.0 g/dL 9.1 (L)   Hematocrit 36.5 - 49.3 % 32.8 (L)   MCV 82 - 98 fL 72 (L)   MCH 26.8 - 34.3 pg 20.0 (L)   MCHC 31.4 - 37.4 g/dL 27.7 (L)   RDW 11.6 - 15.1 % 20.7 (H) "   Platelet Count 149 - 390 Thousands/uL 267   MPV 8.9 - 12.7 fL 10.5   nRBC /100 WBCs 0   Segmented % 43 - 75 % 69   Lymphocytes % 14 - 44 % 20   Monocytes % 4 - 12 % 8   Eosinophils % 0 - 6 % 3   Basophils % 0 - 1 % 0   Immature Grans % 0 - 2 % 0   Absolute Immature Grans 0.00 - 0.20 Thousand/uL 0.02   Absolute Neutrophils 1.85 - 7.62 Thousands/µL 4.71   Absolute Lymphocytes 0.60 - 4.47 Thousands/µL 1.32   Absolute Monocytes 0.17 - 1.22 Thousand/µL 0.52   Absolute Eosinophils 0.00 - 0.61 Thousand/µL 0.17   Absolute Basophils 0.00 - 0.10 Thousands/µL 0.03   (H): Data is abnormally high  (L): Data is abnormally low    Imagin2024 VAS VENOUS DUPLEX -LOWER LIMB UNILATERAL     Impression:     RIGHT LOWER LIMB LIMITED:  Evaluation shows no evidence of thrombus in the common femoral vein.  Doppler evaluation shows a normal response to augmentation maneuvers.     LEFT LOWER LIMB:  Evaluation shows acute non-occlusive deep vein thrombosis noted in the  gastrocnemius and peroneal veins.  No evidence of superficial thrombophlebitis noted.  Doppler evaluation shows a normal response to augmentation maneuvers.  Popliteal, posterior tibial and anterior tibial arterial Doppler waveform's are  monophasic.     Technical findings were given to APOLLO Hoff via tiger text at 13:18.     SIGNATURE:  Electronically Signed by: DELVIN SHAVER MD, RPVI on 2024 10:21:53 PM    Please note:  This report has been generated by a voice recognition software system. Therefore there may be syntax, spelling, and/or grammatical errors. Please call if you have any questions.

## 2024-05-07 ENCOUNTER — HOSPITAL ENCOUNTER (OUTPATIENT)
Dept: INFUSION CENTER | Facility: HOSPITAL | Age: 79
Discharge: HOME | End: 2024-05-07
Attending: INTERNAL MEDICINE
Payer: COMMERCIAL

## 2024-05-07 ENCOUNTER — OFFICE VISIT (OUTPATIENT)
Age: 79
End: 2024-05-07
Payer: COMMERCIAL

## 2024-05-07 VITALS
SYSTOLIC BLOOD PRESSURE: 125 MMHG | RESPIRATION RATE: 17 BRPM | HEIGHT: 74 IN | WEIGHT: 241 LBS | DIASTOLIC BLOOD PRESSURE: 69 MMHG | BODY MASS INDEX: 30.93 KG/M2 | HEART RATE: 64 BPM

## 2024-05-07 VITALS
SYSTOLIC BLOOD PRESSURE: 132 MMHG | DIASTOLIC BLOOD PRESSURE: 60 MMHG | TEMPERATURE: 97.9 F | HEART RATE: 66 BPM | RESPIRATION RATE: 18 BRPM | OXYGEN SATURATION: 99 %

## 2024-05-07 DIAGNOSIS — D50.0 IRON DEFICIENCY ANEMIA DUE TO CHRONIC BLOOD LOSS: Primary | ICD-10-CM

## 2024-05-07 DIAGNOSIS — E11.42 DIABETIC POLYNEUROPATHY ASSOCIATED WITH TYPE 2 DIABETES MELLITUS (HCC): ICD-10-CM

## 2024-05-07 DIAGNOSIS — E11.621 DIABETIC ULCER OF RIGHT HEEL ASSOCIATED WITH TYPE 2 DIABETES MELLITUS, LIMITED TO BREAKDOWN OF SKIN (HCC): Primary | ICD-10-CM

## 2024-05-07 DIAGNOSIS — B35.1 ONYCHOMYCOSIS: ICD-10-CM

## 2024-05-07 DIAGNOSIS — I70.209 PERIPHERAL ARTERIOSCLEROSIS (HCC): ICD-10-CM

## 2024-05-07 DIAGNOSIS — L97.411 DIABETIC ULCER OF RIGHT HEEL ASSOCIATED WITH TYPE 2 DIABETES MELLITUS, LIMITED TO BREAKDOWN OF SKIN (HCC): Primary | ICD-10-CM

## 2024-05-07 DIAGNOSIS — L90.5 CICATRIX: ICD-10-CM

## 2024-05-07 PROCEDURE — 99213 OFFICE O/P EST LOW 20 MIN: CPT | Performed by: PODIATRIST

## 2024-05-07 RX ORDER — SODIUM CHLORIDE 9 MG/ML
20 INJECTION, SOLUTION INTRAVENOUS ONCE
Status: CANCELLED | OUTPATIENT
Start: 2024-05-08

## 2024-05-07 RX ORDER — SODIUM CHLORIDE 9 MG/ML
20 INJECTION, SOLUTION INTRAVENOUS ONCE
Status: COMPLETED | OUTPATIENT
Start: 2024-05-07 | End: 2024-05-07

## 2024-05-07 RX ADMIN — SODIUM CHLORIDE 20 ML/HR: 0.9 INJECTION, SOLUTION INTRAVENOUS at 10:36

## 2024-05-07 RX ADMIN — IRON SUCROSE 300 MG: 20 INJECTION, SOLUTION INTRAVENOUS at 10:43

## 2024-05-07 NOTE — PROGRESS NOTES
Thomas Horne  tolerated lastordered venofer well with no complications. AVS declined. Patient left clinic ambulatory.

## 2024-05-07 NOTE — PROGRESS NOTES
Assessment/Plan: Pressure injury plantar aspect right heel.  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  Diabetic patient with neuropathy.  Peripheral artery disease.  Pain upon ambulation.  Mycosis of nail.     Plan.  Chart reviewed.  Primary care notes reviewed.  Vascular surgery notes reviewed.  Doppler results reviewed.  Patient examined.  Patient advised on condition.  At this time there is no evidence of acute infection however patient has a Sumner grade 0 ulcer/cicatrix on the plantar aspect of his right heel.  Patient advised on daily bandaging and aftercare instruction.  He will obtain insoles for his shoes.  Patient advised on diabetic footcare after performing diabetic foot exam.  Patient watch for signs of infection.  Foot hygiene and nail cutting techniques offered.  Return for follow-up.  Watch for signs of infection.  Patient will monitor socks and shoes for blood or fluid.            Diagnoses and all orders for this visit:     Diabetic ulcer of right heel associated with type 2 diabetes mellitus, limited to breakdown of skin (HCC)     Diabetic polyneuropathy associated with type 2 diabetes mellitus (HCC)     Peripheral arteriosclerosis (HCC)     Pressure injury of deep tissue of right heel     Right foot pain     Onychomycosis     Cicatrix            Subjective: Patient is diabetic.  He is status post arteriogram of the right lower extremity.  Patient has chronic pain of his right heel.  He has history of injury whereby he stepped on a nail and induced a wound.  He has been following with vascular surgery.  He is doing well.  Otherwise he has pain in his right heel upon ambulation.  No history of recent trauma.          Allergies   Allergen Reactions    Lisinopril Rash and Lip Swelling            Current Outpatient Medications:     acetaminophen (TYLENOL) 325 mg tablet, Take 2 tablets (650 mg total) by mouth every 6 (six) hours as needed for mild pain, Disp: , Rfl: 0    amLODIPine  (NORVASC) 10 mg tablet, Take 0.5 tablets (5 mg total) by mouth daily, Disp: 1 tablet, Rfl: 1    aspirin 81 mg chewable tablet, Chew 81 mg daily, Disp: , Rfl:     atorvastatin (LIPITOR) 40 mg tablet, Take 1 tablet (40 mg total) by mouth daily (Patient taking differently: Take 40 mg by mouth every evening), Disp: 90 tablet, Rfl: 3    Blood Glucose Monitoring Suppl (OneTouch Verio Reflect) w/Device KIT, Check blood sugars twice daily. Please substitute with appropriate alternative as covered by patient's insurance. Dx: E11.65 (Patient not taking: Reported on 2/1/2024), Disp: 1 kit, Rfl: 0    cloNIDine (CATAPRES) 0.1 mg tablet, take 1 tablet by mouth every 12 hours, Disp: 180 tablet, Rfl: 1    clopidogrel (PLAVIX) 75 mg tablet, TAKE 1 TABLET BY MOUTH DAILY, Disp: 90 tablet, Rfl: 3    Droplet Pen Needles 32G X 4 MM MISC, USE EVERY EVENING, Disp: 100 each, Rfl: 5    Elastic Bandages & Supports (Medical Compression Stockings) MISC, Use daily Knee High 15-20mmHg (Patient not taking: Reported on 2/1/2024), Disp: 2 each, Rfl: 4    Empagliflozin 25 MG TABS, Take 1 tablet (25 mg total) by mouth daily, Disp: 90 tablet, Rfl: 1    fenofibrate 160 MG tablet, Take 1 tablet (160 mg total) by mouth daily, Disp: 90 tablet, Rfl: 3    ferrous sulfate 324 (65 Fe) mg, Take 1 tablet (324 mg total) by mouth daily, Disp: 90 tablet, Rfl: 0    gabapentin (NEURONTIN) 600 MG tablet, TAKE 1 TABLET BY MOUTH  TWICE DAILY, Disp: 180 tablet, Rfl: 3    glimepiride (AMARYL) 2 mg tablet, Take 1 tablet (2 mg total) by mouth 2 (two) times a day, Disp: 180 tablet, Rfl: 3    glucose blood (OneTouch Verio) test strip, TEST TWICE A DAY, Disp: 200 strip, Rfl: 5    insulin glargine (LANTUS) 100 units/mL subcutaneous injection, Inject 15 Units under the skin daily at bedtime, Disp: 10 mL, Rfl: 0    isosorbide mononitrate (IMDUR) 60 mg 24 hr tablet, Take 1 tablet (60 mg total) by mouth daily, Disp: 90 tablet, Rfl: 3    metFORMIN (GLUCOPHAGE) 500 mg tablet, Take  500 mg by mouth 2 (two) times a day with meals, Disp: , Rfl:     metoprolol tartrate (LOPRESSOR) 50 mg tablet, Take 1 tablet (50 mg total) by mouth every 12 (twelve) hours, Disp: 180 tablet, Rfl: 3    Multiple Vitamins-Minerals (MULTIVITAMIN MEN 50+ PO), Take by mouth daily, Disp: , Rfl:     nitroglycerin (NITROSTAT) 0.4 mg SL tablet, Place 1 tablet (0.4 mg total) under the tongue every 5 (five) minutes as needed for chest pain, Disp: 30 tablet, Rfl: 3    Omega-3 Fatty Acids (fish oil) 1,000 mg, Take 4,000 mg by mouth 2 (two) times a day, Disp: , Rfl:     ondansetron (ZOFRAN) 4 mg tablet, Take 4 mg by mouth every 8 (eight) hours as needed for nausea or vomiting, Disp: , Rfl:     OneTouch Delica Lancets 33G MISC, Check blood sugars twice daily. Please substitute with appropriate alternative as covered by patient's insurance. Dx: E11.65 (Patient not taking: Reported on 2/1/2024), Disp: 200 each, Rfl: 3    pantoprazole (PROTONIX) 40 mg tablet, Take 1 tablet (40 mg total) by mouth 2 (two) times a day, Disp: 180 tablet, Rfl: 0    polyethylene glycol (GOLYTELY) 4000 mL solution, Take 4,000 mL by mouth once for 1 dose (Patient not taking: Reported on 2/22/2024), Disp: 4000 mL, Rfl: 0    ranolazine (RANEXA) 500 mg 12 hr tablet, take 1 tablet by mouth twice a day, Disp: 60 tablet, Rfl: 3    sitaGLIPtin (JANUVIA) 100 mg tablet, Take 1 tablet (100 mg total) by mouth daily, Disp: 90 tablet, Rfl: 1    torsemide (DEMADEX) 20 mg tablet, Take 1 tablet (20 mg total) by mouth daily, Disp: 90 tablet, Rfl: 3         Patient Active Problem List   Diagnosis    Mixed hyperlipidemia    Primary hypertension    Coronary artery disease involving native coronary artery of native heart without angina pectoris    S/P angioplasty with stent    Hx of CABG    S/P AAA repair    S/P prostatectomy    H/O prostate cancer    Type 2 diabetes mellitus with diabetic polyneuropathy, with long-term current use of insulin (HCC)    Varicose veins of left lower  extremity    History of endovascular stent graft for abdominal aortic aneurysm (AAA)    Bruit (arterial)    Peripheral artery disease (HCC)    Type 2 diabetes mellitus with diabetic peripheral angiopathy without gangrene, with long-term current use of insulin (HCC)    Actinic keratoses    Acute kidney injury superimposed on chronic kidney disease     Platelets decreased (HCC)    Gross hematuria    Chronic HFpEF/Moderate pHTN (HFpEF) (HCC)    Mild aortic stenosis    Chronic kidney disease-mineral and bone disorder    Stable angina pectoris    Hypertensive urgency    Elevated troponin level not due myocardial infarction    Diabetic ulcer of right midfoot associated with diabetes mellitus due to underlying condition, limited to breakdown of skin (HCC)    Acute on chronic diastolic heart failure (HCC)    Acute kidney injury superimposed on chronic kidney disease     Frequent PVCs    Acute respiratory distress    Melena    Symptomatic anemia    Multiple gastric ulcers    Iron deficiency anemia due to chronic blood loss    Duodenal ulcer    Acute blood loss anemia    History of colon polyps             Patient ID: Thomas Horne is a 79 y.o. male.     HPI     The following portions of the patient's history were reviewed and updated as appropriate:      family history includes Alcohol abuse in his father; Diabetes in his mother.       reports that he quit smoking about 36 years ago. His smoking use included cigarettes. He started smoking about 68 years ago. He has a 49.5 pack-year smoking history. He has never used smokeless tobacco. He reports current alcohol use of about 14.0 standard drinks of alcohol per week. He reports that he does not use drugs.        Objective:  Patient's shoes and socks removed.   Foot Exam     Right Foot/Ankle      Inspection and Palpation  Skin Exam: abnormal color and erythema;         Left Foot/Ankle       Inspection and Palpation  Skin Exam: abnormal color and erythema;         Physical  Exam  Cardiovascular:      Pulses: Pulses are weak.   Feet:      Right foot:      Skin integrity: Erythema present.      Left foot:      Skin integrity: Erythema present.               General  General Appearance: appears stated age and healthy   Orientation: alert and oriented to person, place, and time   Affect: appropriate   Gait: antalgic         Right Foot/Ankle      Inspection and Palpation  Tenderness: metatarsals   Swelling: none   Arch: pes planus  Claw Toes: fifth toe  Hallux limitus: yes  Skin Exam: callus, dry skin and tinea;      Neurovascular  Dorsalis pedis: 1+  Posterior tibial: 1+  Saphenous nerve sensation: absent  Tibial nerve sensation: absent  Superficial peroneal nerve sensation: absent  Deep peroneal nerve sensation: absent  Sural nerve sensation: absent        Left Foot/Ankle       Inspection and Palpation  Tenderness: metatarsals   Swelling: none   Arch: pes planus  Claw toes: second toe and fifth toe  Hallux limitus: yes  Skin Exam: callus, dry skin and tinea;      Neurovascular  Dorsalis pedis: 1+  Posterior tibial: 1+  Saphenous nerve sensation: absent  Tibial nerve sensation: absent  Superficial peroneal nerve sensation: absent  Deep peroneal nerve sensation: absent  Sural nerve sensation: absent           Physical Exam  Vitals signs and nursing note reviewed.   Cardiovascular:      Rate and Rhythm: Normal rate and regular rhythm.      Pulses:           Dorsalis pedis pulses are 1+ on the right side and 1+ on the left side.        Posterior tibial pulses are 1+ on the right side and 1+ on the left side.   Feet:      Right foot:      Skin integrity: Callus and dry skin present.      Left foot:      Skin integrity: Callus and dry skin present.   Skin:     Capillary Refill: Capillary refill takes 2 to 3 seconds.      Comments:   All nails are thick and mycotic.  Patient demonstrates bilateral dermatophytosis.  There is a pre ulcerative/ corn on the plantar aspect 2nd left toe.    Right  Foot/Ankle   Right Foot Inspection  Skin Exam: dry skin, callus and callus                                 Vascular     The right DP pulse is 1+. The right PT pulse is 1+.   Right Toe  - Comprehensive Exam  Arch: pes planus  Claw Toes: fifth toe  Hallux limitus: yes  Swelling: none   Tenderness: metatarsals            Left Foot/Ankle  Left Foot Inspection  Skin Exam: dry skin and callus                                             Vascular     The left DP pulse is 1+. The left PT pulse is 1+.   Left Toe  - Comprehensive Exam  Arch: pes planus  Claw toes: second toe and fifth toe  Hallux limitus: yes  Swelling: none   Tenderness: metatarsals.     Patient's shoes and socks removed.     Right Foot/Ankle   Right Foot Inspection  Skin Exam: erythema and abnormal color.      Toe Exam: tenderness and right toe deformity.      Sensory   Vibration: diminished  Proprioception: diminished  Monofilament testing: diminished     Vascular  Capillary refills: < 3 seconds        Left Foot/Ankle  Left Foot Inspection  Skin Exam: erythema and abnormal color.      Toe Exam: tenderness and left toe deformity.      Sensory   Vibration: diminished  Proprioception: diminished  Monofilament testing: diminished     Vascular  Capillary refills: < 3 seconds        Assign Risk Category  Deformity present  Loss of protective sensation  Weak pulses  Risk: 2

## 2024-05-09 ENCOUNTER — NURSE TRIAGE (OUTPATIENT)
Age: 79
End: 2024-05-09

## 2024-05-09 ENCOUNTER — TELEPHONE (OUTPATIENT)
Dept: HEMATOLOGY ONCOLOGY | Facility: CLINIC | Age: 79
End: 2024-05-09

## 2024-05-09 NOTE — TELEPHONE ENCOUNTER
Spoke with patient  Patient was instructed by PA  to reduce iron PO to one every other day   Patient will remain on that schedule until next lab work done   Patient verbalizes understanding

## 2024-05-09 NOTE — TELEPHONE ENCOUNTER
Patient Call    Who are you speaking with? Patient    If it is not the patient, are they listed on an active communication consent form? N/A   What is the reason for this call? Would like to speak to someone in the office in regards to his iron pills   Does this require a call back? Yes   If a call back is required, please list best call back number [phone]    If a call back is required, advise that a message will be forwarded to their care team and someone will return their call as soon as possible.   Did you relay this information to the patient? Yes

## 2024-05-09 NOTE — TELEPHONE ENCOUNTER
"Symptoms have been ongoing since November, but becoming worse.  Pt experiencing exertional CP w/ SOB radiating down both arms that resolves after resting  Pt declined ED and wants to be seen since this has been ongoing.   Pt is scheduled for tomorrow    Reason for Disposition   Patient wants to be seen    Answer Assessment - Initial Assessment Questions  1. LOCATION: \"Where does it hurt?\"        Center of chest  2. RADIATION: \"Does the pain go anywhere else?\" (e.g., into neck, jaw, arms, back)      Radiating down both arms  3. ONSET: \"When did the chest pain begin?\" (Minutes, hours or days)       Since November most recent Tuesday  4. PATTERN \"Does the pain come and go, or has it been constant since it started?\"  \"Does it get worse with exertion?\"       On and off, worse with exertion  5. DURATION: \"How long does it last\" (e.g., seconds, minutes, hours)      15  minutes  6. SEVERITY: \"How bad is the pain?\"  (e.g., Scale 1-10; mild, moderate, or severe)     - MILD (1-3): doesn't interfere with normal activities      - MODERATE (4-7): interferes with normal activities or awakens from sleep     - SEVERE (8-10): excruciating pain, unable to do any normal activities        1/10  7. CARDIAC RISK FACTORS: \"Do you have any history of heart problems or risk factors for heart disease?\" (e.g., angina, prior heart attack; diabetes, high blood pressure, high cholesterol, smoker, or strong family history of heart disease)      CABG, stent  8. PULMONARY RISK FACTORS: \"Do you have any history of lung disease?\"  (e.g., blood clots in lung, asthma, emphysema, birth control pills)      denies  9. CAUSE: \"What do you think is causing the chest pain?\"      unknown  10. OTHER SYMPTOMS: \"Do you have any other symptoms?\" (e.g., dizziness, nausea, vomiting, sweating, fever, difficulty breathing, cough)        denies    Protocols used: Chest Pain-ADULT-[place]    "

## 2024-05-10 ENCOUNTER — OFFICE VISIT (OUTPATIENT)
Dept: CARDIOLOGY CLINIC | Facility: CLINIC | Age: 79
End: 2024-05-10
Payer: COMMERCIAL

## 2024-05-10 VITALS
SYSTOLIC BLOOD PRESSURE: 138 MMHG | OXYGEN SATURATION: 99 % | HEIGHT: 74 IN | DIASTOLIC BLOOD PRESSURE: 80 MMHG | HEART RATE: 56 BPM | WEIGHT: 237 LBS | BODY MASS INDEX: 30.42 KG/M2

## 2024-05-10 DIAGNOSIS — R07.9 CHEST PAIN, UNSPECIFIED TYPE: ICD-10-CM

## 2024-05-10 DIAGNOSIS — I50.32 CHRONIC DIASTOLIC HEART FAILURE (HCC): ICD-10-CM

## 2024-05-10 DIAGNOSIS — I49.3 FREQUENT PVCS: ICD-10-CM

## 2024-05-10 DIAGNOSIS — I25.10 CORONARY ARTERY DISEASE INVOLVING NATIVE CORONARY ARTERY OF NATIVE HEART WITHOUT ANGINA PECTORIS: ICD-10-CM

## 2024-05-10 DIAGNOSIS — I10 ESSENTIAL HYPERTENSION: ICD-10-CM

## 2024-05-10 DIAGNOSIS — I49.3 PVC (PREMATURE VENTRICULAR CONTRACTION): ICD-10-CM

## 2024-05-10 DIAGNOSIS — I35.0 MILD AORTIC STENOSIS: ICD-10-CM

## 2024-05-10 DIAGNOSIS — I20.89 STABLE ANGINA PECTORIS: ICD-10-CM

## 2024-05-10 DIAGNOSIS — R07.9 CHEST PAIN, UNSPECIFIED TYPE: Primary | ICD-10-CM

## 2024-05-10 DIAGNOSIS — I10 HYPERTENSION, UNSPECIFIED TYPE: ICD-10-CM

## 2024-05-10 DIAGNOSIS — E78.2 MIXED HYPERLIPIDEMIA: ICD-10-CM

## 2024-05-10 PROCEDURE — 93000 ELECTROCARDIOGRAM COMPLETE: CPT | Performed by: PHYSICIAN ASSISTANT

## 2024-05-10 PROCEDURE — 99214 OFFICE O/P EST MOD 30 MIN: CPT | Performed by: PHYSICIAN ASSISTANT

## 2024-05-10 RX ORDER — NITROGLYCERIN 0.4 MG/1
0.4 TABLET SUBLINGUAL
Qty: 30 TABLET | Refills: 0 | Status: SHIPPED | OUTPATIENT
Start: 2024-05-10 | End: 2024-06-09

## 2024-05-10 RX ORDER — RANOLAZINE 1000 MG/1
1000 TABLET, EXTENDED RELEASE ORAL 2 TIMES DAILY
Qty: 180 TABLET | Refills: 3 | Status: SHIPPED | OUTPATIENT
Start: 2024-05-10 | End: 2025-05-05

## 2024-05-10 RX ORDER — ISOSORBIDE MONONITRATE 30 MG/1
90 TABLET, EXTENDED RELEASE ORAL DAILY
Qty: 90 TABLET | Refills: 3 | Status: SHIPPED | OUTPATIENT
Start: 2024-05-10 | End: 2024-05-10 | Stop reason: SDUPTHER

## 2024-05-10 RX ORDER — RANOLAZINE 1000 MG/1
1000 TABLET, EXTENDED RELEASE ORAL 2 TIMES DAILY
Qty: 60 TABLET | Refills: 0 | Status: SHIPPED | OUTPATIENT
Start: 2024-05-10 | End: 2024-05-10 | Stop reason: SDUPTHER

## 2024-05-10 RX ORDER — ISOSORBIDE MONONITRATE 30 MG/1
90 TABLET, EXTENDED RELEASE ORAL DAILY
Qty: 270 TABLET | Refills: 0 | OUTPATIENT
Start: 2024-05-10 | End: 2024-09-07

## 2024-05-10 RX ORDER — ISOSORBIDE MONONITRATE 30 MG/1
90 TABLET, EXTENDED RELEASE ORAL DAILY
Qty: 270 TABLET | Refills: 1 | Status: SHIPPED | OUTPATIENT
Start: 2024-05-10 | End: 2024-11-06

## 2024-05-10 NOTE — PROGRESS NOTES
Progress Note - Cardiology Office  [facility]    Thomas Horne 79 y.o. male MRN: [id number]  : 1945  Encounter: [id number]      Assessment:     Coronary artery disease.  Chronic stable angina.  Chronic diastolic heart failure.  Essential hypertension.  Dyslipidemia.  PVCs.  Mild to moderate aortic valve stenosis.  Pulmonary hypertension.  CKD stage III.  AAA s/p EVAR.  Peripheral vascular disease.  LLE DVT.   Iron deficiency anemia.  Type II diabetes.  Obstructive sleep apnea.    Discussion Summary and Plan:    Coronary artery disease.  - s/p CABG x 3 () and s/p PCI with NANDINI to distal Lcx ().   - Patient with chronic stable angina, experiences chest pain only on exertion.  - 10/19/22 cardiac catheterization: three-vessel coronary artery disease with 100% occluded right coronary artery, 100% occluded mid LAD, vein graft to RCA is 100% occluded with RCA has some collaterals from left circulation. Lima to LAD is widely patent. Most likely graft to circumflex is also 100% occluded as no competitive flow is noted.     Prox LAD to Mid LAD lesion is 100% stenosed. Lima to LAD is widely patent  Prox RCA lesion is 100% stenosed. RCA graft is 100% occluded  Mid Cx to Dist Cx lesion is 40% stenosed.  Ost Cx to Prox Cx lesion is 40% stenosed.  1st Mrg lesion is 99% stenosed. Which appeared to be a bypassed vessel and bypassed seems to be 100% occluded could not visualized.  Origin to Prox Graft lesion is 100% stenosed. RCA graft is 100% occluded  Distal circ stent is widely patent  Patient has grade 1 collaterals from left circulation to distal right coronary artery  Patient has severe three-vessel coronary artery disease with totally occluded 100% RCA, mid LAD and a medium size obtuse marginal. Patent stent seen in distal circumflex. Nonobstructive disease noted of left main and circumflex coronary artery. Vein graft to RCA is 100% occluded. And LIMA to LAD is widely patent.    -  Continue aspirin 81 mg daily.  - Continue Lopressor 25 mg twice daily.  - Patient has nitroglycerin prescribed, states that he does not take the medication.  Discussed with patient recommendations to take nitroglycerin as needed for chest pain.  - Currently on Ranexa 500 mg twice daily.  Will increase to Ranexa 1000 mg twice daily.  - Increase Imdur 60 mg daily to Imdur 90 mg daily.    - Case discussed with Dr. Bell, will plan for aggressive medical therapy.    Chronic stable angina.  -  Patient states since November 2023, particularly after his right lower extremity bypass procedure, he has noted episodes of chest pressure with exertion.  He states that he only experiences episodes of chest pressure with exertion.  At rest he denies chest pain/pressure.  He states that he had a significant episode of chest pain/pressure on 5/07/24 after walking to and from the hospital for iron transfusions.  He states that his chest pain/pressure was localized to the center of his chest and resolved after few minutes with rest.  He denies episodes of chest pain/pressure associated with shortness of breath, palpitations, lightheadedness, dizziness, headache, nausea, vomiting.  - Patient has nitroglycerin prescribed, states that he does not take the medication.  Discussed with patient recommendations to take nitroglycerin as needed for chest pain.  - Currently on Ranexa 500 mg twice daily.  Will increase to Ranexa 1000 mg twice daily.  - Currently on Imdur 60 mg daily.  - Patient is currently being treated for iron deficiency anemia.  It appears prior to his right lower extremity bypass surgery in November 2023 his hemoglobin was 14.7.  Most recent hemoglobin ranges from 7.4-10.0.  Patient's anemia may be contributing to his episodes of angina.    Chronic diastolic heart failure.  - Does not appear in acute exacerbation.  Patient is euvolemic on examination.  - 6/17/23 TTE: LVEF 50%. Diastolic function is moderately abnormal,  consistent with grade II (pseudonormal) relaxation. Left atrial filling pressure is elevated.  Left atrium moderately dilated.  Aortic Valve: The aortic valve is trileaflet. The leaflets are moderately thickened. The leaflets are moderately calcified. The leaflets exhibit normal mobility. There is mild to moderate stenosis. The aortic valve peak velocity is 3.2 m/s. The aortic valve peak gradient is 41 mmHg. The aortic valve mean gradient is 25 mmHg.  Mild mitral valve regurgitation. Tricuspid Valve: There is mild regurgitation. The right ventricular systolic pressure is moderately to severely elevated at 50-60mmHg.   - Currently on Lopressor 25 mg twice daily, amlodipine 5 mg daily, clonidine 0.1 mg twice daily, Imdur 60 mg daily, and torsemide 20 mg daily.  - Increase Imdur 60 mg daily to Imdur 90 mg daily.    - Currently on Jardiance 25 mg daily.  - CHF education.     Essential hypertension.  - BP during today's office visit, 132/60.   - Continue Lopressor 25 mg twice daily, amlodipine 5 mg daily, clonidine 0.1 mg twice daily, and torsemide 20 mg daily.  - Increase Imdur 60 mg daily to Imdur 90 mg daily.    - 6/17/23 TTE: LVEF 50%. Diastolic function is moderately abnormal, consistent with grade II (pseudonormal) relaxation. Left atrial filling pressure is elevated.  Left atrium moderately dilated.  Aortic Valve: The aortic valve is trileaflet. The leaflets are moderately thickened. The leaflets are moderately calcified. The leaflets exhibit normal mobility. There is mild to moderate stenosis. The aortic valve peak velocity is 3.2 m/s. The aortic valve peak gradient is 41 mmHg. The aortic valve mean gradient is 25 mmHg.  Mild mitral valve regurgitation. Tricuspid Valve: There is mild regurgitation. The right ventricular systolic pressure is moderately to severely elevated at 50-60mmHg.     Dyslipidemia.  - 4/17/24 lipid panel: Cholesterol 131, triglycerides 113, HDL 54, LDL 57.  - Currently on Lipitor 40 mg  daily, fenofibrate 160 mg daily, and omega-3 fatty acids.    PVCs.  - Asymptomatic.   - Currently on Lopressor 25 mg twice daily.    Mild to moderate aortic valve stenosis.  - 6/17/23 TTE:  Aortic Valve The aortic valve is trileaflet. The leaflets are moderately thickened. The leaflets are moderately calcified. The leaflets exhibit normal mobility. There is no evidence of regurgitation. There is mild to moderate stenosis. The aortic valve peak velocity is 3.2 m/s. The aortic valve peak gradient is 41 mmHg. The aortic valve mean gradient is 25 mmHg.   - 7/26/22 TTE:   Aortic Valve The aortic valve is trileaflet. The leaflets are moderately thickened. The leaflets are moderately calcified. The leaflets exhibit normal mobility. There is no evidence of regurgitation. There is mild stenosis.  Mean/peak gradient is 14/27 mm Hg, JEFF is 1.8 cm2, DVI is 0.52     Pulmonary hypertension.  - 6/17/23 TTE: The right ventricular systolic pressure is moderately to severely elevated at 50-60mmHg.     CKD stage III.  - Baseline creatinine appears between 1.6 and 1.8.  - Follows outpatient with [facility].    AAA s/p EVAR.  - s/p EVAR in 2012.   - 6/17/23 CT abdomen/pelvis without contrast: Stent graft is identified. The native aneurysm is similar in size measuring approximately 5.2 cm as compared to March.   - Follows outpatient with [facility] vascular surgery.    Peripheral vascular disease.  - S/p right CFA to peroneal artery bypass with reversed CryoVein (11/2023).   - Follows outpatient with [facility] vascular surgery.    LLE DVT.   - 4/18/24 VAS venous duplex-bilateral lower limb: Left lower extremity- acute non-occlusive deep vein thrombosis noted in the gastrocnemius and peroneal veins.  - Currently on warfarin managed by PCP.    Iron deficiency anemia.  - Follows outpatient with [facility].     Type II diabetes.  - 4/17/24 HgbA1c: 7.0.   - Currently on Jardiance 25 mg daily.  - Care per PCP.      Obstructive sleep apnea.  - Patient reports he is not able to tolerate CPAP mask.      Patient / Caretaker was advised and educated to call our office  immediately if  patient has any new symptoms of chest pain/shortness of breath, near-syncope, syncope, light headedness sustained palpitations  or any other cardiovascular symptoms before their scheduled follow-up appointment.  Office number was provided #[phone].      Please call [phone] if any questions.  Counseling :      A description of the counseling.  Goals and Barriers.  Patient's ability to self care: Yes  Medication side effect reviewed with patient in detail and all their questions answered to their satisfaction.    HPI :     Thomas Horne is a 79 y.o. male with PMHx of CAD s/p CABG x 3 (2002) and s/p PCI with NANDINI to distal Lcx (2021), chronic stable angina, chronic diastolic heart failure, essential HTN, dyslipidemia, PVCs, mild to moderate aortic valve stenosis, pulmonary hypertension, CKDIII, AAA s/p EVAR (2012), PVD s/p right lower extremity bypass (11/2023), iron deficiency anemia, LLE DVT, DMII, who presents for routine outpatient cardiology follow-up and evaluation for chest pain.    Patient states since November 2023, particularly after his right lower extremity bypass procedure, he has noted episodes of chest pressure with exertion.  He states that he only experiences episodes of chest pressure with exertion.  At rest he denies chest pain/pressure.  He states that he had a significant episode of chest pain/pressure on 5/07/24 after walking to and from the hospital for iron transfusions.  He states that his chest pain/pressure was localized to the center of his chest and resolved after few minutes with rest.  He denies episodes of chest pain/pressure associated with shortness of breath, palpitations, lightheadedness, dizziness, headache, nausea, vomiting.    Review of Systems   Constitutional:  Positive for activity change. Negative  for appetite change, chills, diaphoresis, fatigue, fever and unexpected weight change.   Respiratory:  Positive for chest tightness. Negative for cough, shortness of breath and wheezing.    Cardiovascular:  Positive for chest pain. Negative for palpitations and leg swelling.   Gastrointestinal:  Negative for abdominal distention, abdominal pain, constipation, diarrhea, nausea and vomiting.   Neurological:  Negative for dizziness, syncope, weakness, light-headedness, numbness and headaches.       Historical Information   Past Medical History:   Diagnosis Date    Anemia     CAD (coronary artery disease)     Cancer (HCC)     prostate    CHF (congestive heart failure) (HCC)     Chronic kidney disease     Diabetes mellitus (HCC)     Duodenal ulcer     Hyperlipidemia     Hypertension     Myocardial infarction (HCC)     Neuropathy     Bilateral feet    Sleep apnea     Could not tolerate CPAP     Past Surgical History:   Procedure Laterality Date    ADENOIDECTOMY      CARDIAC CATHETERIZATION Left 10/19/2022    Procedure: Cardiac Left Heart Cath;  Surgeon: Danielle Pereira MD;  Location: [facility];  Service: Cardiology    CARDIAC SURGERY  2002    3 cardiac bypass then angioplasty 7/2020    CHOLECYSTECTOMY      COLONOSCOPY  02/14/2024    CORONARY ARTERY BYPASS GRAFT      IR LOWER EXTREMITY ANGIOGRAM  11/01/2023    [place] BYPASS W/VEIN FEMORAL-POPLITEAL Right 11/16/2023    Procedure: BYPASS FEMORAL-POPLITEAL WITH CRYO VEIN, RIGHT FEMORAL ENDARTERECTOMY;  Surgeon: Vasquez Clark MD;  Location: [facility];  Service: Vascular    NY SLCTV CATHJ 3RD+ ORD SLCTV ABDL PEL/LXTR BRNCH Right 11/01/2023    Procedure: ARTERIOGRAM Right lower extremity arteriogram with CO2 via right groin access;  Surgeon: Vasquez Clark MD;  Location: [facility];  Service: Vascular    PROSTATE SURGERY      TONSILLECTOMY       Social History     Substance and Sexual Activity   Alcohol Use Yes    Alcohol/week: 14.0 standard  drinks of alcohol    Types: 14 Cans of beer per week    Comment: 1 -2 daily     Social History     Substance and Sexual Activity   Drug Use Never     Social History     Tobacco Use   Smoking Status Former    Current packs/day: 0.00    Average packs/day: 1.5 packs/day for 33.0 years (49.5 ttl pk-yrs)    Types: Cigarettes    Start date:     Quit date:     Years since quittin.3    Passive exposure: Past   Smokeless Tobacco Never     Family History:   Family History   Problem Relation Age of Onset    Diabetes Mother     Alcohol abuse Father     Mental illness Neg Hx        Meds/Allergies     Allergies   Allergen Reactions    Lisinopril Rash and Lip Swelling       Current Outpatient Medications:     acetaminophen (TYLENOL) 325 mg tablet, Take 2 tablets (650 mg total) by mouth every 6 (six) hours as needed for mild pain, Disp: , Rfl: 0    amLODIPine (NORVASC) 10 mg tablet, Take 0.5 tablets (5 mg total) by mouth daily, Disp: 1 tablet, Rfl: 1    aspirin 81 mg chewable tablet, Chew 81 mg daily, Disp: , Rfl:     atorvastatin (LIPITOR) 40 mg tablet, Take 1 tablet (40 mg total) by mouth daily (Patient taking differently: Take 40 mg by mouth every evening), Disp: 90 tablet, Rfl: 3    Blood Glucose Monitoring Suppl (OneTouch Verio Reflect) w/Device KIT, Check blood sugars twice daily. Please substitute with appropriate alternative as covered by patient's insurance. Dx: E11.65, Disp: 1 kit, Rfl: 0    cloNIDine (CATAPRES) 0.1 mg tablet, take 1 tablet by mouth every 12 hours, Disp: 180 tablet, Rfl: 1    Droplet Pen Needles 32G X 4 MM MISC, USE EVERY EVENING, Disp: 100 each, Rfl: 5    Empagliflozin 25 MG TABS, Take 1 tablet (25 mg total) by mouth daily, Disp: 90 tablet, Rfl: 1    fenofibrate 160 MG tablet, Take 1 tablet (160 mg total) by mouth daily, Disp: 90 tablet, Rfl: 3    ferrous sulfate 324 (65 Fe) mg, Take 1 tablet (324 mg total) by mouth 2 (two) times a day before meals, Disp: 180 tablet, Rfl: 1    gabapentin  (NEURONTIN) 600 MG tablet, TAKE 1 TABLET BY MOUTH  TWICE DAILY, Disp: 180 tablet, Rfl: 3    glimepiride (AMARYL) 2 mg tablet, Take 1 tablet (2 mg total) by mouth 2 (two) times a day, Disp: 180 tablet, Rfl: 3    glucose blood (OneTouch Verio) test strip, TEST TWICE A DAY, Disp: 200 strip, Rfl: 5    insulin glargine (LANTUS) 100 units/mL subcutaneous injection, Inject 15 Units under the skin daily at bedtime, Disp: 10 mL, Rfl: 0    isosorbide mononitrate (IMDUR) 30 mg 24 hr tablet, Take 3 tablets (90 mg total) by mouth daily, Disp: 90 tablet, Rfl: 3    metFORMIN (GLUCOPHAGE) 500 mg tablet, Take 500 mg by mouth 2 (two) times a day with meals, Disp: , Rfl:     metoprolol tartrate (LOPRESSOR) 50 mg tablet, Take 0.5 tablets (25 mg total) by mouth every 12 (twelve) hours, Disp: 30 tablet, Rfl: 0    Multiple Vitamins-Minerals (MULTIVITAMIN MEN 50+ PO), Take by mouth daily, Disp: , Rfl:     nitroglycerin (NITROSTAT) 0.4 mg SL tablet, Place 1 tablet (0.4 mg total) under the tongue every 5 (five) minutes as needed for chest pain, Disp: 30 tablet, Rfl: 0    Omega-3 Fatty Acids (fish oil) 1,000 mg, Take 4,000 mg by mouth 2 (two) times a day, Disp: , Rfl:     ondansetron (ZOFRAN) 4 mg tablet, Take 4 mg by mouth every 8 (eight) hours as needed for nausea or vomiting, Disp: , Rfl:     OneTouch Delica Lancets 33G MISC, Check blood sugars twice daily. Please substitute with appropriate alternative as covered by patient's insurance. Dx: E11.65, Disp: 200 each, Rfl: 3    ranolazine (RANEXA) 1000 MG SR tablet, Take 1 tablet (1,000 mg total) by mouth 2 (two) times a day, Disp: 60 tablet, Rfl: 0    sitaGLIPtin (JANUVIA) 100 mg tablet, Take 1 tablet (100 mg total) by mouth daily, Disp: 90 tablet, Rfl: 1    torsemide (DEMADEX) 20 mg tablet, Take 1 tablet (20 mg total) by mouth daily, Disp: 90 tablet, Rfl: 3    warfarin (Coumadin) 1 mg tablet, Take 1 tablet (1 mg total) by mouth daily, Disp: 90 tablet, Rfl: 0    Elastic Bandages & Supports  "(Medical Compression Stockings) MISC, Use daily Knee High 15-20mmHg (Patient not taking: Reported on 2024), Disp: 2 each, Rfl: 4    pantoprazole (PROTONIX) 40 mg tablet, Take 1 tablet (40 mg total) by mouth 2 (two) times a day (Patient taking differently: Take 40 mg by mouth daily), Disp: 180 tablet, Rfl: 0    warfarin (Coumadin) 3 mg tablet, Take 1 tablet (3 mg total) by mouth daily, Disp: 90 tablet, Rfl: 0    Vitals: Blood pressure 138/80, pulse 56, height 6' 2\" (1.88 m), weight 108 kg (237 lb), SpO2 99%.    Body mass index is 30.43 kg/m².  Wt Readings from Last 3 Encounters:   05/10/24 108 kg (237 lb)   24 109 kg (241 lb)   24 109 kg (241 lb)     Vitals:    05/10/24 0906   Weight: 108 kg (237 lb)     BP Readings from Last 3 Encounters:   05/10/24 138/80   24 132/60   24 125/69       Physical Exam:  Physical Exam  Vitals reviewed.   Constitutional:       General: He is not in acute distress.  Cardiovascular:      Rate and Rhythm: Regular rhythm. Bradycardia present.      Pulses: Normal pulses.      Heart sounds: Murmur heard.   Pulmonary:      Effort: Pulmonary effort is normal. No respiratory distress.      Breath sounds: Normal breath sounds.   Abdominal:      General: Abdomen is flat. There is no distension.      Palpations: Abdomen is soft.      Tenderness: There is no abdominal tenderness.   Musculoskeletal:      Right lower leg: No edema.      Left lower leg: No edema.   Skin:     General: Skin is warm and dry.   Neurological:      Mental Status: He is alert and oriented to person, place, and time.       Diagnostic Studies Review Cardio:      EK/10/24 EKG: Sinus bradycardia with first-degree AV block, 56 bpm.  Pulmonary disease pattern.  Septal infarct, cited on or before 10/19/22.    Cardiac testing:       Echo complete   Result date: 22    Left Ventricle Left ventricular cavity size is normal. Wall thickness is mildly increased. Systolic function is normal.  " Estimated ejection fraction is 50-55 % Although no diagnostic regional wall motion abnormality was identified, this possibility cannot be completely excluded on the basis of this study. Diastolic function is mildly abnormal, consistent with grade I (abnormal) relaxation.   Interventricular Septum There is sigmoid appearance of the septum.   Right Ventricle Right ventricular cavity size is normal. Systolic function appears normal visually. Wall thickness is normal.   Left Atrium The atrium is mildly dilated.   Right Atrium The atrium is normal in size.   Aortic Valve The aortic valve is trileaflet. The leaflets are moderately thickened. The leaflets are moderately calcified. The leaflets exhibit normal mobility. There is no evidence of regurgitation. There is mild stenosis.  Mean/peak gradient is 14/27 mm Hg, JEFF is 1.8 cm2, DVI is 0.52   Mitral Valve There is mild thickening. There is mild regurgitation. There is no evidence of stenosis.   Tricuspid Valve Tricuspid valve structure is normal. There is mild regurgitation. There is no evidence of stenosis. There is no indirect evidence of pulmonary hypertension.   Pulmonic Valve Pulmonic valve structure is normal. There is trace regurgitation. There is no evidence of stenosis.   Ascending Aorta The aortic root is mildly dilated.  Consider other imaging modalities for more accurate sizing.   IVC/SVC The inferior vena cava is not well visualized.   Pericardium There is no pericardial effusion. The pericardium is normal in appearance. There is a left pleural effusion.         Results for orders placed during the hospital encounter of 02/16/22    NM Myocardial Perfusion Spect (Pharmacological Induced Stress and/or Rest)    Interpretation Summary    Stress Combined Conclusion: Left ventricular perfusion is abnormal. There is mild photon reduction in the anterior wall at stress.  Findings are consistent with ischemia. Additional area of infarct involving the mid and apical  portion of the inferior wall.    Stress Function: Left ventricular function post-stress is abnormal. Global function is mildly reduced. There were no regional wall motion abnormalities during stress. Post-stress ejection fraction is 40 %.    Stress ECG: No ST deviation is noted. There were no arrhythmias during recovery. . The ECG was negative for ischemia.    Perfusion: There is a left ventricular perfusion defect that is small in size with severe reduction in uptake present in the mid to apical inferior location(s) that is fixed. There is a left ventricular perfusion defect that is small to medium in size with mild reduction in uptake present in the mid to apical anterior location(s) that is reversible.      Lab Review   Lab Results   Component Value Date    WBC 8.20 04/17/2024    HGB 10.1 (L) 04/17/2024    HCT 36.0 (L) 04/17/2024    MCV 72 (L) 04/17/2024    RDW 21.6 (H) 04/17/2024     04/17/2024     BMP:  Lab Results   Component Value Date    SODIUM 140 04/17/2024    K 4.0 04/17/2024    CL 99 04/17/2024    CO2 31 04/17/2024    BUN 44 (H) 04/17/2024    CREATININE 2.07 (H) 04/17/2024    GLUC 114 04/11/2024    GLUF 126 (H) 04/17/2024    CALCIUM 9.3 04/17/2024    CORRECTEDCA 9.1 11/21/2023    EGFR 29 04/17/2024    MG 2.2 04/17/2024     LFT:  Lab Results   Component Value Date    AST 21 04/17/2024    ALT 16 04/17/2024    ALKPHOS 38 04/17/2024    TP 8.1 04/17/2024    ALB 4.2 04/17/2024      Lab Results   Component Value Date    TQD6SVDQLPNT 1.536 06/17/2023     Lab Results   Component Value Date    HGBA1C 7.0 (H) 04/17/2024     Lipid Profile:   Lab Results   Component Value Date    CHOLESTEROL 131 04/17/2024    HDL 51 04/17/2024    LDLCALC 57 04/17/2024    TRIG 113 04/17/2024     Naa Cruz PA-C

## 2024-05-10 NOTE — TELEPHONE ENCOUNTER
Patients dosages were just changed today but he wants these medications to go to [facility] since he is on them long term. Please resend    Reason for call:   [x] Refill   [] Prior Auth  [] Other:     Office:   [] PCP/Provider -   [x] Specialty/Provider -  Cardiology - Naa Irizarry PA-C    Medication: ranolazine (RANEXA) 1000 MG SR tablet Take 1 tablet (1,000 mg total) by mouth 2 (two) times a day     isosorbide mononitrate (IMDUR) 30 mg 24 hr tablet Take 3 tablets (90 mg total) by mouth daily     Quantity: 90 day supply for both    Pharmacy: [facility] - [address] [phone]     Does the patient have enough for 3 days?   [x] Yes   [] No - Send as HP to POD

## 2024-05-13 DIAGNOSIS — N18.30 BENIGN HYPERTENSION WITH CKD (CHRONIC KIDNEY DISEASE) STAGE III (HCC): ICD-10-CM

## 2024-05-13 DIAGNOSIS — I12.9 BENIGN HYPERTENSION WITH CKD (CHRONIC KIDNEY DISEASE) STAGE III (HCC): ICD-10-CM

## 2024-05-14 RX ORDER — AMLODIPINE BESYLATE 10 MG/1
5 TABLET ORAL DAILY
Qty: 1 TABLET | Refills: 1 | OUTPATIENT
Start: 2024-05-14

## 2024-05-14 RX ORDER — METOPROLOL TARTRATE 50 MG/1
25 TABLET, FILM COATED ORAL EVERY 12 HOURS SCHEDULED
Qty: 30 TABLET | Refills: 0 | OUTPATIENT
Start: 2024-05-14

## 2024-05-15 DIAGNOSIS — N18.30 BENIGN HYPERTENSION WITH CKD (CHRONIC KIDNEY DISEASE) STAGE III (HCC): ICD-10-CM

## 2024-05-15 DIAGNOSIS — Z79.4 TYPE 2 DIABETES MELLITUS WITH DIABETIC PERIPHERAL ANGIOPATHY WITHOUT GANGRENE, WITH LONG-TERM CURRENT USE OF INSULIN (HCC): Primary | ICD-10-CM

## 2024-05-15 DIAGNOSIS — E11.51 TYPE 2 DIABETES MELLITUS WITH DIABETIC PERIPHERAL ANGIOPATHY WITHOUT GANGRENE, WITH LONG-TERM CURRENT USE OF INSULIN (HCC): Primary | ICD-10-CM

## 2024-05-15 DIAGNOSIS — I12.9 BENIGN HYPERTENSION WITH CKD (CHRONIC KIDNEY DISEASE) STAGE III (HCC): ICD-10-CM

## 2024-05-15 NOTE — TELEPHONE ENCOUNTER
Reason for call:   [x] Refill   [] Prior Auth  [] Other:     Office:   [x] PCP/Provider - Lombardi,Frank   [] Specialty/Provider -     Medication: Metformin    Dose/Frequency: 500 mh BID    Quantity: 180    [facility] [address]    Does the patient have enough for 3 days?   [] Yes   [x] No - Send as HP to POD  Pharmacy did not have prescription

## 2024-05-16 RX ORDER — AMLODIPINE BESYLATE 10 MG/1
5 TABLET ORAL DAILY
Qty: 1 TABLET | Refills: 1 | OUTPATIENT
Start: 2024-05-16

## 2024-05-16 RX ORDER — METOPROLOL TARTRATE 50 MG/1
25 TABLET, FILM COATED ORAL EVERY 12 HOURS SCHEDULED
Qty: 30 TABLET | Refills: 0 | OUTPATIENT
Start: 2024-05-16

## 2024-05-22 ENCOUNTER — LAB (OUTPATIENT)
Dept: LAB | Facility: CLINIC | Age: 79
End: 2024-05-22
Payer: COMMERCIAL

## 2024-05-22 DIAGNOSIS — D50.9 IRON DEFICIENCY ANEMIA, UNSPECIFIED IRON DEFICIENCY ANEMIA TYPE: ICD-10-CM

## 2024-05-22 DIAGNOSIS — R79.89 ELEVATED SERUM CREATININE: ICD-10-CM

## 2024-05-22 DIAGNOSIS — D50.0 IRON DEFICIENCY ANEMIA DUE TO CHRONIC BLOOD LOSS: Primary | ICD-10-CM

## 2024-05-22 DIAGNOSIS — I82.452 ACUTE DEEP VEIN THROMBOSIS (DVT) OF LEFT PERONEAL VEIN (HCC): ICD-10-CM

## 2024-05-22 LAB
ANION GAP SERPL CALCULATED.3IONS-SCNC: 10 MMOL/L (ref 4–13)
BUN SERPL-MCNC: 33 MG/DL (ref 5–25)
CALCIUM SERPL-MCNC: 9.5 MG/DL (ref 8.4–10.2)
CHLORIDE SERPL-SCNC: 101 MMOL/L (ref 96–108)
CO2 SERPL-SCNC: 29 MMOL/L (ref 21–32)
CREAT SERPL-MCNC: 1.71 MG/DL (ref 0.6–1.3)
ERYTHROCYTE [DISTWIDTH] IN BLOOD BY AUTOMATED COUNT: 26.7 % (ref 11.6–15.1)
FERRITIN SERPL-MCNC: 44 NG/ML (ref 24–336)
GFR SERPL CREATININE-BSD FRML MDRD: 36 ML/MIN/1.73SQ M
GLUCOSE P FAST SERPL-MCNC: 72 MG/DL (ref 65–99)
HCT VFR BLD AUTO: 39.8 % (ref 36.5–49.3)
HGB BLD-MCNC: 11.7 G/DL (ref 12–17)
INR PPP: 3.94 (ref 0.84–1.19)
IRON SATN MFR SERPL: 10 % (ref 15–50)
IRON SERPL-MCNC: 39 UG/DL (ref 50–212)
MCH RBC QN AUTO: 22.8 PG (ref 26.8–34.3)
MCHC RBC AUTO-ENTMCNC: 29.4 G/DL (ref 31.4–37.4)
MCV RBC AUTO: 77 FL (ref 82–98)
PLATELET # BLD AUTO: 249 THOUSANDS/UL (ref 149–390)
PMV BLD AUTO: 11.3 FL (ref 8.9–12.7)
POTASSIUM SERPL-SCNC: 4.3 MMOL/L (ref 3.5–5.3)
PROTHROMBIN TIME: 37.6 SECONDS (ref 11.6–14.5)
RBC # BLD AUTO: 5.14 MILLION/UL (ref 3.88–5.62)
SODIUM SERPL-SCNC: 140 MMOL/L (ref 135–147)
TIBC SERPL-MCNC: 386 UG/DL (ref 250–450)
UIBC SERPL-MCNC: 347 UG/DL (ref 155–355)
WBC # BLD AUTO: 5.94 THOUSAND/UL (ref 4.31–10.16)

## 2024-05-22 PROCEDURE — 85027 COMPLETE CBC AUTOMATED: CPT

## 2024-05-22 PROCEDURE — 80048 BASIC METABOLIC PNL TOTAL CA: CPT

## 2024-05-22 PROCEDURE — 83550 IRON BINDING TEST: CPT

## 2024-05-22 PROCEDURE — 83540 ASSAY OF IRON: CPT

## 2024-05-22 PROCEDURE — 85610 PROTHROMBIN TIME: CPT

## 2024-05-22 PROCEDURE — 82728 ASSAY OF FERRITIN: CPT

## 2024-05-22 PROCEDURE — 36415 COLL VENOUS BLD VENIPUNCTURE: CPT

## 2024-05-22 RX ORDER — SODIUM CHLORIDE 9 MG/ML
20 INJECTION, SOLUTION INTRAVENOUS ONCE
OUTPATIENT
Start: 2024-06-05

## 2024-05-23 ENCOUNTER — ANTICOAG VISIT (OUTPATIENT)
Dept: FAMILY MEDICINE CLINIC | Facility: CLINIC | Age: 79
End: 2024-05-23

## 2024-05-24 ENCOUNTER — TELEPHONE (OUTPATIENT)
Dept: NEPHROLOGY | Facility: CLINIC | Age: 79
End: 2024-05-24

## 2024-05-24 NOTE — TELEPHONE ENCOUNTER
Left message on patient's VM to return my call so arrange for 3 more Venofer infusions.    ----- Message from Jose Fischer MD sent at 5/22/2024 10:20 PM EDT -----  Please inform patient that most recent labs (5/22/24) show that his kidney function is stable and his hemoglobin is better and up to 11.7 - this is the best reading he has since Oct 2023. His iron levels are better but remain on the low side. I will order another 3 doses of Venofer at the infusion center - please help arrange for this.

## 2024-05-28 ENCOUNTER — LAB (OUTPATIENT)
Dept: LAB | Facility: CLINIC | Age: 79
End: 2024-05-28
Payer: COMMERCIAL

## 2024-05-28 ENCOUNTER — ANTICOAG VISIT (OUTPATIENT)
Dept: FAMILY MEDICINE CLINIC | Facility: CLINIC | Age: 79
End: 2024-05-28

## 2024-05-28 DIAGNOSIS — I82.452 ACUTE DEEP VEIN THROMBOSIS (DVT) OF LEFT PERONEAL VEIN (HCC): ICD-10-CM

## 2024-05-28 LAB
INR PPP: 2.61 (ref 0.84–1.19)
PROTHROMBIN TIME: 27.4 SECONDS (ref 11.6–14.5)

## 2024-05-28 PROCEDURE — 85610 PROTHROMBIN TIME: CPT

## 2024-05-28 PROCEDURE — 36415 COLL VENOUS BLD VENIPUNCTURE: CPT

## 2024-05-28 NOTE — TELEPHONE ENCOUNTER
Patient calling back returning petar's call.     He would like a call back to schedule his infusions.

## 2024-05-28 NOTE — TELEPHONE ENCOUNTER
Pt advised that iron infusions are scheduled at [facility]; 6/11 @ 12:30,  6/18 @ 10:30 and 6/25 @ 9:00 AM.

## 2024-05-30 ENCOUNTER — TELEPHONE (OUTPATIENT)
Age: 79
End: 2024-05-30

## 2024-05-30 DIAGNOSIS — Z79.4 TYPE 2 DIABETES MELLITUS WITH DIABETIC PERIPHERAL ANGIOPATHY WITHOUT GANGRENE, WITH LONG-TERM CURRENT USE OF INSULIN (HCC): ICD-10-CM

## 2024-05-30 DIAGNOSIS — E11.51 TYPE 2 DIABETES MELLITUS WITH DIABETIC PERIPHERAL ANGIOPATHY WITHOUT GANGRENE, WITH LONG-TERM CURRENT USE OF INSULIN (HCC): ICD-10-CM

## 2024-05-30 DIAGNOSIS — E78.2 MIXED HYPERLIPIDEMIA: ICD-10-CM

## 2024-05-30 DIAGNOSIS — N18.30 BENIGN HYPERTENSION WITH CKD (CHRONIC KIDNEY DISEASE) STAGE III (HCC): ICD-10-CM

## 2024-05-30 DIAGNOSIS — I12.9 BENIGN HYPERTENSION WITH CKD (CHRONIC KIDNEY DISEASE) STAGE III (HCC): ICD-10-CM

## 2024-05-30 DIAGNOSIS — K26.9 DUODENAL ULCER: Primary | ICD-10-CM

## 2024-05-30 DIAGNOSIS — I25.10 CORONARY ARTERY DISEASE INVOLVING NATIVE CORONARY ARTERY OF NATIVE HEART WITHOUT ANGINA PECTORIS: ICD-10-CM

## 2024-05-30 RX ORDER — PANTOPRAZOLE SODIUM 40 MG/1
40 TABLET, DELAYED RELEASE ORAL DAILY
Qty: 90 TABLET | Refills: 1 | Status: SHIPPED | OUTPATIENT
Start: 2024-05-30 | End: 2024-08-28

## 2024-05-30 NOTE — TELEPHONE ENCOUNTER
Please call and inform patient that I did renew his pantoprazole 40 mg daily and send it to [facility].  Thank you

## 2024-05-30 NOTE — TELEPHONE ENCOUNTER
Reason for call: pt states [facility] never got Metformin refill   [x] Refill   [] Prior Auth  [] Other:     Office:   [x] PCP/Provider -   [] Specialty/Provider -     Medication:           Does the patient have enough for 3 days?   [x] Yes   [] No - Send as HP to POD

## 2024-05-30 NOTE — TELEPHONE ENCOUNTER
Pt called into the medication refill line, he would like to request refill for medication -   pantoprazole (PROTONIX) 40 mg tablet. Pt understand that the medication has ended, but he wanted to know if he should continue taking the medication or does provider want him to stop. Please call pt with providers response. If provider does agree with refill request, please send script over to [facility] home delivery pharmacy on file. Thank you

## 2024-05-31 DIAGNOSIS — I12.9 BENIGN HYPERTENSION WITH CKD (CHRONIC KIDNEY DISEASE) STAGE III (HCC): ICD-10-CM

## 2024-05-31 DIAGNOSIS — N18.30 BENIGN HYPERTENSION WITH CKD (CHRONIC KIDNEY DISEASE) STAGE III (HCC): ICD-10-CM

## 2024-05-31 RX ORDER — CLONIDINE HYDROCHLORIDE 0.1 MG/1
0.1 TABLET ORAL EVERY 12 HOURS
Qty: 180 TABLET | Refills: 1 | Status: SHIPPED | OUTPATIENT
Start: 2024-05-31

## 2024-05-31 RX ORDER — METOPROLOL TARTRATE 50 MG/1
25 TABLET, FILM COATED ORAL EVERY 12 HOURS SCHEDULED
Qty: 90 TABLET | Refills: 1 | Status: SHIPPED | OUTPATIENT
Start: 2024-05-31

## 2024-05-31 RX ORDER — AMLODIPINE BESYLATE 10 MG/1
5 TABLET ORAL DAILY
Qty: 45 TABLET | Refills: 1 | Status: SHIPPED | OUTPATIENT
Start: 2024-05-31

## 2024-05-31 RX ORDER — ATORVASTATIN CALCIUM 40 MG/1
40 TABLET, FILM COATED ORAL DAILY
Qty: 90 TABLET | Refills: 1 | Status: SHIPPED | OUTPATIENT
Start: 2024-05-31

## 2024-06-05 DIAGNOSIS — I50.9 CHF (CONGESTIVE HEART FAILURE) (HCC): ICD-10-CM

## 2024-06-06 ENCOUNTER — TELEPHONE (OUTPATIENT)
Dept: OTHER | Facility: OTHER | Age: 79
End: 2024-06-06

## 2024-06-06 RX ORDER — TORSEMIDE 20 MG/1
20 TABLET ORAL DAILY
Qty: 90 TABLET | Refills: 1 | Status: SHIPPED | OUTPATIENT
Start: 2024-06-06

## 2024-06-06 NOTE — TELEPHONE ENCOUNTER
Patient daughter called saying that her dad want to canceled his GI CAPSULE ENDOSCOPY for 6/7 @ 7:30 AM, daughter also stated that her dad is going to give the office a call back to reschedule the procedure.

## 2024-06-11 ENCOUNTER — HOSPITAL ENCOUNTER (OUTPATIENT)
Dept: INFUSION CENTER | Facility: HOSPITAL | Age: 79
Discharge: HOME | End: 2024-06-11
Attending: INTERNAL MEDICINE
Payer: COMMERCIAL

## 2024-06-11 DIAGNOSIS — D50.0 IRON DEFICIENCY ANEMIA DUE TO CHRONIC BLOOD LOSS: Primary | ICD-10-CM

## 2024-06-11 PROCEDURE — 96365 THER/PROPH/DIAG IV INF INIT: CPT

## 2024-06-11 RX ORDER — SODIUM CHLORIDE 9 MG/ML
20 INJECTION, SOLUTION INTRAVENOUS ONCE
Status: COMPLETED | OUTPATIENT
Start: 2024-06-11 | End: 2024-06-11

## 2024-06-11 RX ORDER — SODIUM CHLORIDE 9 MG/ML
20 INJECTION, SOLUTION INTRAVENOUS ONCE
Status: CANCELLED | OUTPATIENT
Start: 2024-06-18

## 2024-06-11 RX ADMIN — SODIUM CHLORIDE 20 ML/HR: 9 INJECTION, SOLUTION INTRAVENOUS at 12:54

## 2024-06-11 RX ADMIN — IRON SUCROSE 300 MG: 20 INJECTION, SOLUTION INTRAVENOUS at 12:54

## 2024-06-11 NOTE — PROGRESS NOTES
Thomas Horne  tolerated treatment well with no complications.      Thomas Horne is aware of future appt on 6/18 at 1030.     AVS printed and given to Thomas Horne:  No (Declined by Thomas Horne)

## 2024-06-11 NOTE — PLAN OF CARE
Problem: Potential for Falls  Goal: Patient will remain free of falls  Description: INTERVENTIONS:  - Educate patient/family on patient safety including physical limitations  - Instruct patient to call for assistance with activity   - Keep Call bell within reach  - Keep care items and personal belongings within reach  Outcome: Progressing      normal...

## 2024-06-18 ENCOUNTER — HOSPITAL ENCOUNTER (OUTPATIENT)
Dept: INFUSION CENTER | Facility: HOSPITAL | Age: 79
Discharge: HOME | End: 2024-06-18
Attending: INTERNAL MEDICINE
Payer: COMMERCIAL

## 2024-06-18 VITALS
TEMPERATURE: 97 F | OXYGEN SATURATION: 95 % | HEART RATE: 59 BPM | SYSTOLIC BLOOD PRESSURE: 152 MMHG | RESPIRATION RATE: 18 BRPM | DIASTOLIC BLOOD PRESSURE: 69 MMHG

## 2024-06-18 DIAGNOSIS — D50.0 IRON DEFICIENCY ANEMIA DUE TO CHRONIC BLOOD LOSS: Primary | ICD-10-CM

## 2024-06-18 RX ORDER — SODIUM CHLORIDE 9 MG/ML
20 INJECTION, SOLUTION INTRAVENOUS ONCE
Status: COMPLETED | OUTPATIENT
Start: 2024-06-18 | End: 2024-06-18

## 2024-06-18 RX ORDER — SODIUM CHLORIDE 9 MG/ML
20 INJECTION, SOLUTION INTRAVENOUS ONCE
Status: CANCELLED | OUTPATIENT
Start: 2024-06-25

## 2024-06-18 RX ADMIN — SODIUM CHLORIDE 20 ML/HR: 9 INJECTION, SOLUTION INTRAVENOUS at 11:01

## 2024-06-18 RX ADMIN — IRON SUCROSE 300 MG: 20 INJECTION, SOLUTION INTRAVENOUS at 11:01

## 2024-06-18 NOTE — PROGRESS NOTES
..Thomas Horne  tolerated treatment well with no complications.      Thomas Horne is aware of future appt on 6/26 at 0900.     AVS printed and given to Thomas Horne:    No (Declined by Thomas Horne)

## 2024-06-21 ENCOUNTER — HOSPITAL ENCOUNTER (OUTPATIENT)
Dept: RADIOLOGY | Facility: HOSPITAL | Age: 79
Discharge: HOME | End: 2024-06-21
Attending: SURGERY
Payer: COMMERCIAL

## 2024-06-21 DIAGNOSIS — E08.621 DIABETIC ULCER OF RIGHT MIDFOOT ASSOCIATED WITH DIABETES MELLITUS DUE TO UNDERLYING CONDITION, LIMITED TO BREAKDOWN OF SKIN (HCC): ICD-10-CM

## 2024-06-21 DIAGNOSIS — L97.411 DIABETIC ULCER OF RIGHT MIDFOOT ASSOCIATED WITH DIABETES MELLITUS DUE TO UNDERLYING CONDITION, LIMITED TO BREAKDOWN OF SKIN (HCC): ICD-10-CM

## 2024-06-21 PROCEDURE — 93922 UPR/L XTREMITY ART 2 LEVELS: CPT | Performed by: SURGERY

## 2024-06-21 PROCEDURE — 93923 UPR/LXTR ART STDY 3+ LVLS: CPT

## 2024-06-21 PROCEDURE — 93925 LOWER EXTREMITY STUDY: CPT | Performed by: SURGERY

## 2024-06-21 PROCEDURE — 93925 LOWER EXTREMITY STUDY: CPT

## 2024-06-25 ENCOUNTER — HOSPITAL ENCOUNTER (OUTPATIENT)
Dept: INFUSION CENTER | Facility: HOSPITAL | Age: 79
Discharge: HOME | End: 2024-06-25
Attending: INTERNAL MEDICINE
Payer: COMMERCIAL

## 2024-06-25 VITALS
TEMPERATURE: 97.1 F | RESPIRATION RATE: 18 BRPM | OXYGEN SATURATION: 97 % | DIASTOLIC BLOOD PRESSURE: 74 MMHG | HEART RATE: 67 BPM | SYSTOLIC BLOOD PRESSURE: 164 MMHG

## 2024-06-25 DIAGNOSIS — D50.0 IRON DEFICIENCY ANEMIA DUE TO CHRONIC BLOOD LOSS: Primary | ICD-10-CM

## 2024-06-25 RX ORDER — SODIUM CHLORIDE 9 MG/ML
20 INJECTION, SOLUTION INTRAVENOUS ONCE
Status: COMPLETED | OUTPATIENT
Start: 2024-06-25 | End: 2024-06-25

## 2024-06-25 RX ORDER — SODIUM CHLORIDE 9 MG/ML
20 INJECTION, SOLUTION INTRAVENOUS ONCE
Status: CANCELLED | OUTPATIENT
Start: 2024-06-25

## 2024-06-25 RX ADMIN — SODIUM CHLORIDE 20 ML/HR: 9 INJECTION, SOLUTION INTRAVENOUS at 09:10

## 2024-06-25 RX ADMIN — IRON SUCROSE 300 MG: 20 INJECTION, SOLUTION INTRAVENOUS at 09:10

## 2024-06-25 NOTE — PROGRESS NOTES
Thomas Horne  tolerated treatment well with no complications.      Thomas Horne is aware of future appt for repeat labs on 8/1 and future follow up with provider.    AVS printed and given to Thomas Horne:  Yes

## 2024-07-04 DIAGNOSIS — E11.51 TYPE 2 DIABETES MELLITUS WITH DIABETIC PERIPHERAL ANGIOPATHY WITHOUT GANGRENE, WITH LONG-TERM CURRENT USE OF INSULIN (HCC): ICD-10-CM

## 2024-07-04 DIAGNOSIS — Z79.4 TYPE 2 DIABETES MELLITUS WITH DIABETIC PERIPHERAL ANGIOPATHY WITHOUT GANGRENE, WITH LONG-TERM CURRENT USE OF INSULIN (HCC): ICD-10-CM

## 2024-07-05 RX ORDER — EMPAGLIFLOZIN 25 MG/1
25 TABLET, FILM COATED ORAL DAILY
Qty: 90 TABLET | Refills: 1 | Status: SHIPPED | OUTPATIENT
Start: 2024-07-05

## 2024-07-09 ENCOUNTER — OFFICE VISIT (OUTPATIENT)
Age: 79
End: 2024-07-09
Payer: COMMERCIAL

## 2024-07-09 VITALS
RESPIRATION RATE: 19 BRPM | OXYGEN SATURATION: 95 % | HEIGHT: 74 IN | DIASTOLIC BLOOD PRESSURE: 83 MMHG | WEIGHT: 242.6 LBS | HEART RATE: 62 BPM | BODY MASS INDEX: 31.13 KG/M2 | SYSTOLIC BLOOD PRESSURE: 171 MMHG

## 2024-07-09 DIAGNOSIS — L90.5 CICATRIX: ICD-10-CM

## 2024-07-09 DIAGNOSIS — B35.9 DERMATOPHYTOSIS: ICD-10-CM

## 2024-07-09 DIAGNOSIS — I70.209 PERIPHERAL ARTERIOSCLEROSIS (HCC): Primary | ICD-10-CM

## 2024-07-09 DIAGNOSIS — M77.42 METATARSALGIA OF BOTH FEET: ICD-10-CM

## 2024-07-09 DIAGNOSIS — B35.1 ONYCHOMYCOSIS: ICD-10-CM

## 2024-07-09 DIAGNOSIS — E11.42 DIABETIC POLYNEUROPATHY ASSOCIATED WITH TYPE 2 DIABETES MELLITUS (HCC): ICD-10-CM

## 2024-07-09 DIAGNOSIS — M54.16 RADICULOPATHY OF LUMBAR REGION: ICD-10-CM

## 2024-07-09 DIAGNOSIS — M77.41 METATARSALGIA OF BOTH FEET: ICD-10-CM

## 2024-07-09 PROCEDURE — 99214 OFFICE O/P EST MOD 30 MIN: CPT | Performed by: PODIATRIST

## 2024-07-09 RX ORDER — GABAPENTIN 600 MG/1
600 TABLET ORAL 3 TIMES DAILY
Qty: 90 TABLET | Refills: 0 | Status: SHIPPED | OUTPATIENT
Start: 2024-07-09 | End: 2024-08-08

## 2024-07-09 RX ORDER — KETOCONAZOLE 20 MG/G
CREAM TOPICAL DAILY
Qty: 60 G | Refills: 1 | Status: SHIPPED | OUTPATIENT
Start: 2024-07-09 | End: 2024-08-08

## 2024-07-09 RX ORDER — GABAPENTIN 600 MG/1
600 TABLET ORAL 3 TIMES DAILY
Qty: 90 TABLET | Refills: 0 | Status: SHIPPED | OUTPATIENT
Start: 2024-07-09 | End: 2024-07-09

## 2024-07-09 RX ORDER — PENTOXIFYLLINE 400 MG/1
400 TABLET, EXTENDED RELEASE ORAL
Qty: 90 TABLET | Refills: 2 | Status: SHIPPED | OUTPATIENT
Start: 2024-07-09 | End: 2024-10-07

## 2024-07-09 NOTE — PROGRESS NOTES
Assessment/Plan: Metatarsalgia secondary to diabetic neuropathy and radiculopathy.  Intermittent claudication with peripheral artery disease.  Stasis dermatitis.  Pain upon ambulation.  Mycosis of nail.  Dermatophytosis.    Plan.  Chart reviewed.  PCP notes reviewed.  Lab work reviewed.  At this time we will increase gabapentin dosing at 600 mg 3 times daily.  This should help with neuropathic pain.  In order to help with intermittent claudication, we will consider Trental.  This has been ordered.  Patient will try it.  He will check with vascular surgery.  Patient has been ordered topical antifungal cream to help with mycosis of nail and skin.  Foot hygiene instruction given.  Watch for signs of infection or ulceration.  Return for follow-up.         Diagnoses and all orders for this visit:    Peripheral arteriosclerosis (HCC)  -     pentoxifylline (TRENtal) 400 mg ER tablet; Take 1 tablet (400 mg total) by mouth 3 (three) times a day with meals    Radiculopathy of lumbar region  -     Discontinue: gabapentin (NEURONTIN) 600 MG tablet; Take 1 tablet (600 mg total) by mouth 3 (three) times a day  -     gabapentin (NEURONTIN) 600 MG tablet; Take 1 tablet (600 mg total) by mouth 3 (three) times a day    Diabetic polyneuropathy associated with type 2 diabetes mellitus (HCC)  -     Discontinue: gabapentin (NEURONTIN) 600 MG tablet; Take 1 tablet (600 mg total) by mouth 3 (three) times a day  -     gabapentin (NEURONTIN) 600 MG tablet; Take 1 tablet (600 mg total) by mouth 3 (three) times a day    Metatarsalgia of both feet  -     Discontinue: gabapentin (NEURONTIN) 600 MG tablet; Take 1 tablet (600 mg total) by mouth 3 (three) times a day  -     gabapentin (NEURONTIN) 600 MG tablet; Take 1 tablet (600 mg total) by mouth 3 (three) times a day    Cicatrix    Onychomycosis    Dermatophytosis  -     ketoconazole (NIZORAL) 2 % cream; Apply topically daily          Subjective: Patient has continued and ongoing pain in his  feet and legs.  He has increasing pain in his feet at night.  He gets cramping in his legs with walking.  He had Doppler.  He is waiting to see arterial specialist.    Allergies   Allergen Reactions    Lisinopril Rash and Lip Swelling         Current Outpatient Medications:     acetaminophen (TYLENOL) 325 mg tablet, Take 2 tablets (650 mg total) by mouth every 6 (six) hours as needed for mild pain, Disp: , Rfl: 0    amLODIPine (NORVASC) 10 mg tablet, Take 0.5 tablets (5 mg total) by mouth daily, Disp: 45 tablet, Rfl: 1    aspirin 81 mg chewable tablet, Chew 81 mg daily, Disp: , Rfl:     atorvastatin (LIPITOR) 40 mg tablet, Take 1 tablet (40 mg total) by mouth daily, Disp: 90 tablet, Rfl: 1    Blood Glucose Monitoring Suppl (OneTouch Verio Reflect) w/Device KIT, Check blood sugars twice daily. Please substitute with appropriate alternative as covered by patient's insurance. Dx: E11.65, Disp: 1 kit, Rfl: 0    cloNIDine (CATAPRES) 0.1 mg tablet, take 1 tablet by mouth every 12 hours, Disp: 180 tablet, Rfl: 1    Droplet Pen Needles 32G X 4 MM MISC, USE EVERY EVENING, Disp: 100 each, Rfl: 5    Empagliflozin (Jardiance) 25 MG TABS, TAKE 1 TABLET BY MOUTH DAILY, Disp: 90 tablet, Rfl: 1    fenofibrate 160 MG tablet, Take 1 tablet (160 mg total) by mouth daily, Disp: 90 tablet, Rfl: 3    gabapentin (NEURONTIN) 600 MG tablet, Take 1 tablet (600 mg total) by mouth 3 (three) times a day, Disp: 90 tablet, Rfl: 0    glimepiride (AMARYL) 2 mg tablet, Take 1 tablet (2 mg total) by mouth 2 (two) times a day, Disp: 180 tablet, Rfl: 3    glucose blood (OneTouch Verio) test strip, TEST TWICE A DAY, Disp: 200 strip, Rfl: 5    insulin glargine (LANTUS) 100 units/mL subcutaneous injection, Inject 15 Units under the skin daily at bedtime, Disp: 10 mL, Rfl: 0    isosorbide mononitrate (IMDUR) 30 mg 24 hr tablet, Take 3 tablets (90 mg total) by mouth daily, Disp: 270 tablet, Rfl: 1    ketoconazole (NIZORAL) 2 % cream, Apply topically daily,  Disp: 60 g, Rfl: 1    metFORMIN (GLUCOPHAGE) 500 mg tablet, Take 1 tablet (500 mg total) by mouth 2 (two) times a day with meals, Disp: 180 tablet, Rfl: 1    metoprolol tartrate (LOPRESSOR) 50 mg tablet, Take 0.5 tablets (25 mg total) by mouth every 12 (twelve) hours, Disp: 90 tablet, Rfl: 1    Multiple Vitamins-Minerals (MULTIVITAMIN MEN 50+ PO), Take by mouth daily, Disp: , Rfl:     Omega-3 Fatty Acids (fish oil) 1,000 mg, Take 4,000 mg by mouth 2 (two) times a day, Disp: , Rfl:     ondansetron (ZOFRAN) 4 mg tablet, Take 4 mg by mouth every 8 (eight) hours as needed for nausea or vomiting, Disp: , Rfl:     OneTouch Delica Lancets 33G MISC, Check blood sugars twice daily. Please substitute with appropriate alternative as covered by patient's insurance. Dx: E11.65, Disp: 200 each, Rfl: 3    pantoprazole (PROTONIX) 40 mg tablet, Take 1 tablet (40 mg total) by mouth daily, Disp: 90 tablet, Rfl: 1    pentoxifylline (TRENtal) 400 mg ER tablet, Take 1 tablet (400 mg total) by mouth 3 (three) times a day with meals, Disp: 90 tablet, Rfl: 2    ranolazine (RANEXA) 1000 MG SR tablet, Take 1 tablet (1,000 mg total) by mouth 2 (two) times a day, Disp: 180 tablet, Rfl: 3    sitaGLIPtin (JANUVIA) 100 mg tablet, Take 1 tablet (100 mg total) by mouth daily, Disp: 90 tablet, Rfl: 1    torsemide (DEMADEX) 20 mg tablet, TAKE 1 TABLET BY MOUTH DAILY, Disp: 90 tablet, Rfl: 1    warfarin (Coumadin) 3 mg tablet, Take 1 tablet (3 mg total) by mouth daily, Disp: 90 tablet, Rfl: 0    Elastic Bandages & Supports (Medical Compression Stockings) MISC, Use daily Knee High 15-20mmHg (Patient not taking: Reported on 2/1/2024), Disp: 2 each, Rfl: 4    ferrous sulfate 324 (65 Fe) mg, Take 1 tablet (324 mg total) by mouth 2 (two) times a day before meals, Disp: 180 tablet, Rfl: 1    nitroglycerin (NITROSTAT) 0.4 mg SL tablet, Place 1 tablet (0.4 mg total) under the tongue every 5 (five) minutes as needed for chest pain, Disp: 30 tablet, Rfl: 0     warfarin (Coumadin) 1 mg tablet, Take 1 tablet (1 mg total) by mouth daily, Disp: 90 tablet, Rfl: 0    Patient Active Problem List   Diagnosis    Mixed hyperlipidemia    Primary hypertension    Coronary artery disease involving native coronary artery of native heart without angina pectoris    S/P angioplasty with stent    Hx of CABG    S/P AAA repair    S/P prostatectomy    H/O prostate cancer    Type 2 diabetes mellitus with diabetic polyneuropathy, with long-term current use of insulin (HCC)    Varicose veins of left lower extremity    History of endovascular stent graft for abdominal aortic aneurysm (AAA)    Bruit (arterial)    Peripheral artery disease (HCC)    Type 2 diabetes mellitus with diabetic peripheral angiopathy without gangrene, with long-term current use of insulin (HCC)    Actinic keratoses    Acute kidney injury superimposed on chronic kidney disease  (HCC)    Platelets decreased (HCC)    Gross hematuria    Chronic HFpEF/Moderate pHTN (HFpEF) (HCC)    Mild aortic stenosis    Chronic kidney disease-mineral and bone disorder    Stable angina pectoris    Hypertensive urgency    Elevated troponin level not due myocardial infarction    Diabetic ulcer of right midfoot associated with diabetes mellitus due to underlying condition, limited to breakdown of skin (HCC)    Acute on chronic diastolic heart failure (HCC)    Stage 3b chronic kidney disease (HCC)    Frequent PVCs    Acute respiratory distress    Melena    Symptomatic anemia    Multiple gastric ulcers    Iron deficiency anemia due to chronic blood loss    Duodenal ulcer    Acute blood loss anemia    History of colon polyps    Shortness of breath    Iron deficiency anemia    Acute deep vein thrombosis (DVT) of left peroneal vein (HCC)          Patient ID: Thomas Horne is a 79 y.o. male.    HPI    The following portions of the patient's history were reviewed and updated as appropriate:     family history includes Alcohol abuse in his father; Cancer in  his brother, brother, sister, and sister; Diabetes in his mother and sister; Heart disease in his brother.      reports that he quit smoking about 36 years ago. His smoking use included cigarettes. He started smoking about 68 years ago. He has a 49.5 pack-year smoking history. He has been exposed to tobacco smoke. He has never used smokeless tobacco. He reports current alcohol use of about 14.0 standard drinks of alcohol per week. He reports that he does not use drugs.    Vitals:    07/09/24 0945   BP: (!) 171/83   Pulse: 62   Resp: 19   SpO2: 95%       Review of Systems      Objective:  Patient's shoes and socks removed.   Foot ExamPhysical Exam      Right Foot/Ankle      Inspection and Palpation  Skin Exam: abnormal color and erythema;         Left Foot/Ankle       Inspection and Palpation  Skin Exam: abnormal color and erythema;         Physical Exam  Cardiovascular:      Pulses: Pulses are weak.   Feet:      Right foot:      Skin integrity: Erythema present.      Left foot:      Skin integrity: Erythema present.               General  General Appearance: appears stated age and healthy   Orientation: alert and oriented to person, place, and time   Affect: appropriate   Gait: antalgic         Right Foot/Ankle      Inspection and Palpation  Tenderness: metatarsals   Swelling: none   Arch: pes planus  Claw Toes: fifth toe  Hallux limitus: yes  Skin Exam: callus, dry skin and tinea;      Neurovascular  Dorsalis pedis: 1+  Posterior tibial: 1+  Saphenous nerve sensation: absent  Tibial nerve sensation: absent  Superficial peroneal nerve sensation: absent  Deep peroneal nerve sensation: absent  Sural nerve sensation: absent        Left Foot/Ankle       Inspection and Palpation  Tenderness: metatarsals   Swelling: none   Arch: pes planus  Claw toes: second toe and fifth toe  Hallux limitus: yes  Skin Exam: callus, dry skin and tinea;      Neurovascular  Dorsalis pedis: 1+  Posterior tibial: 1+  Saphenous nerve  sensation: absent  Tibial nerve sensation: absent  Superficial peroneal nerve sensation: absent  Deep peroneal nerve sensation: absent  Sural nerve sensation: absent           Physical Exam  Vitals signs and nursing note reviewed.   Cardiovascular:      Rate and Rhythm: Normal rate and regular rhythm.      Pulses:           Dorsalis pedis pulses are 1+ on the right side and 1+ on the left side.        Posterior tibial pulses are 1+ on the right side and 1+ on the left side.   Feet:      Right foot:      Skin integrity: Callus and dry skin present.      Left foot:      Skin integrity: Callus and dry skin present.   Skin:     Capillary Refill: Capillary refill takes 2 to 3 seconds.      Comments:   All nails are thick and mycotic.  Patient demonstrates bilateral dermatophytosis.  There is a pre ulcerative/ corn on the plantar aspect 2nd left toe.    Right Foot/Ankle   Right Foot Inspection  Skin Exam: dry skin, callus and callus                                 Vascular     The right DP pulse is 1+. The right PT pulse is 1+.   Right Toe  - Comprehensive Exam  Arch: pes planus  Claw Toes: fifth toe  Hallux limitus: yes  Swelling: none   Tenderness: metatarsals            Left Foot/Ankle  Left Foot Inspection  Skin Exam: dry skin and callus                                             Vascular     The left DP pulse is 1+. The left PT pulse is 1+.   Left Toe  - Comprehensive Exam  Arch: pes planus  Claw toes: second toe and fifth toe  Hallux limitus: yes  Swelling: none   Tenderness: metatarsals.     Patient's shoes and socks removed.     Right Foot/Ankle   Right Foot Inspection  Skin Exam: erythema and abnormal color.      Toe Exam: tenderness and right toe deformity.      Sensory   Vibration: diminished  Proprioception: diminished  Monofilament testing: diminished     Vascular  Capillary refills: < 3 seconds        Left Foot/Ankle  Left Foot Inspection  Skin Exam: erythema and abnormal color.      Toe Exam: tenderness  and left toe deformity.      Sensory   Vibration: diminished  Proprioception: diminished  Monofilament testing: diminished     Vascular  Capillary refills: < 3 seconds        Assign Risk Category  Deformity present  Loss of protective sensation  Weak pulses  Risk: 2

## 2024-07-10 ENCOUNTER — OFFICE VISIT (OUTPATIENT)
Dept: OBGYN CLINIC | Facility: CLINIC | Age: 79
End: 2024-07-10
Payer: COMMERCIAL

## 2024-07-10 ENCOUNTER — APPOINTMENT (OUTPATIENT)
Dept: RADIOLOGY | Facility: AMBULARY SURGERY CENTER | Age: 79
End: 2024-07-10
Attending: ORTHOPAEDIC SURGERY
Payer: COMMERCIAL

## 2024-07-10 VITALS — WEIGHT: 240 LBS | BODY MASS INDEX: 30.8 KG/M2 | HEIGHT: 74 IN

## 2024-07-10 DIAGNOSIS — Z01.89 ENCOUNTER FOR LOWER EXTREMITY COMPARISON IMAGING STUDY: ICD-10-CM

## 2024-07-10 DIAGNOSIS — Z79.4 TYPE 2 DIABETES MELLITUS WITH DIABETIC POLYNEUROPATHY, WITH LONG-TERM CURRENT USE OF INSULIN (HCC): ICD-10-CM

## 2024-07-10 DIAGNOSIS — I82.452 ACUTE DEEP VEIN THROMBOSIS (DVT) OF LEFT PERONEAL VEIN (HCC): ICD-10-CM

## 2024-07-10 DIAGNOSIS — M25.571 PAIN, JOINT, ANKLE AND FOOT, RIGHT: ICD-10-CM

## 2024-07-10 DIAGNOSIS — I73.9 PERIPHERAL ARTERY DISEASE (HCC): ICD-10-CM

## 2024-07-10 DIAGNOSIS — M72.2 PLANTAR FASCIITIS, RIGHT: Primary | ICD-10-CM

## 2024-07-10 DIAGNOSIS — E11.42 TYPE 2 DIABETES MELLITUS WITH DIABETIC POLYNEUROPATHY, WITH LONG-TERM CURRENT USE OF INSULIN (HCC): ICD-10-CM

## 2024-07-10 PROCEDURE — 99204 OFFICE O/P NEW MOD 45 MIN: CPT | Performed by: ORTHOPAEDIC SURGERY

## 2024-07-10 PROCEDURE — 73630 X-RAY EXAM OF FOOT: CPT

## 2024-07-10 PROCEDURE — 73620 X-RAY EXAM OF FOOT: CPT

## 2024-07-10 PROCEDURE — 73600 X-RAY EXAM OF ANKLE: CPT

## 2024-07-10 RX ORDER — WARFARIN SODIUM 1 MG/1
1 TABLET ORAL DAILY
Qty: 90 TABLET | Refills: 0 | Status: SHIPPED | OUTPATIENT
Start: 2024-07-10

## 2024-07-10 RX ORDER — WARFARIN SODIUM 3 MG/1
3 TABLET ORAL DAILY
Qty: 90 TABLET | Refills: 0 | Status: SHIPPED | OUTPATIENT
Start: 2024-07-10

## 2024-07-10 NOTE — PROGRESS NOTES
James R Lachman, M.D.  Attending, Orthopaedic Surgery  Foot and Ankle  [facility]        ORTHOPAEDIC FOOT AND ANKLE CLINIC VISIT     Assessment:     Encounter Diagnoses   Name Primary?    Plantar fasciitis, right Yes    Type 2 diabetes mellitus with diabetic polyneuropathy, with long-term current use of insulin (HCC)     Peripheral artery disease (HCC)     Pain, joint, ankle and foot, right     Encounter for lower extremity comparison imaging study        Plan:   The patient verbalized understanding of exam findings and treatment plan. We engaged in the shared decision-making process and treatment options were discussed at length with the patient. Surgical and conservative management discussed today along with risks and benefits.  Presents with b/l foot pain (R>L)  Was seen by Dr. Arango yesterday, pain is 2/2 diabetic neuropathy/radiculopathy and intermittent claudication with PAD. Gabapentin increased to 600 mg TID and trental started for claudication  Patient with symptoms of plantar fascitis in his right heel  Supportive shoes with gel heel cups with full contact diabetic neuropathy orthotics  Rest, activity modification  Begin PT  Night splint for morning pain  Return if symptoms worsen or fail to improve.      History of Present Illness:   Chief Complaint:   Chief Complaint   Patient presents with    Right Foot - Pain     Having painful feet. Had an appt yesterday with podiatry. Thinks its never damage.     Left Foot - Pain     Thomas Horne is a 79 y.o. male who is being seen for bilateral foot pain, right worse than left . Had a nail in right foot last fall, has vascular surgery right lower limb in November.  Pain is localized at sole of foot, heel with minimal radiating and described as sharp and severe. Patient denies numbness, tingling or radicular pain.  Does have history of neuropathy.  Patient does not smoke, does have diabetes on insulin and does  take blood thinners on  warfarin, h/o DVT).  Patient denies family history of anesthesia complications and has not had any complications with anesthesia.     Pain/symptom timing:  Worse during the day when active  Pain/symptom context:  Worse with activites and work  Pain/symptom modifying factors:  Rest makes better, activities make worse  Pain/symptom associated signs/symptoms: none    Prior treatment   NSAIDsNo    Injections No   Bracing/Orthotics Yes   Physical Therapy Yes     Orthopedic Surgical History:   See below     Past Medical, Surgical and Social History:  Past Medical History:  has a past medical history of Anemia, CAD (coronary artery disease), Callus, Cancer (HCC), CHF (congestive heart failure) (HCC), Chronic kidney disease, Clotting disorder (HCC), Coronary artery disease (1988), Deep vein thrombosis (HCC), Diabetes mellitus (HCC), Difficulty walking, Duodenal ulcer, Hyperlipidemia, Hypertension, Myocardial infarction (HCC), Neuropathy, 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), Plantar fasciitis, and Sleep apnea.  Problem List: does not have any pertinent problems on file.  Past Surgical History:  has a past surgical history that includes Cardiac surgery (2002); Tonsillectomy; ADENOIDECTOMY; Coronary artery bypass graft; Cholecystectomy; Prostate surgery; Cardiac catheterization (Left, 10/19/2022); Colonoscopy (02/14/2024); pr slctv cathj 3rd+ ord slctv abdl pel/lxtr brnch (Right, 11/01/2023); IR lower extremity angiogram (11/01/2023); and pr bypass w/vein femoral-popliteal (Right, 11/16/2023).  Family History: family history includes Alcohol abuse in his father; Cancer in his brother, brother, sister, and sister; Diabetes in his mother and sister; Heart disease in his brother.  Social History:  reports that he quit smoking about 36 years ago. His smoking use included cigarettes. He started smoking about 68 years ago. He has a 49.5 pack-year smoking history. He has been exposed to tobacco smoke. He has never used smokeless tobacco. He  "reports current alcohol use of about 14.0 standard drinks of alcohol per week. He reports that he does not use drugs.  Current Medications: has a current medication list which includes the following prescription(s): acetaminophen, amlodipine, aspirin, atorvastatin, onetouch verio reflect, clonidine, droplet pen needles, jardiance, fenofibrate, gabapentin, glimepiride, onetouch verio, insulin glargine, isosorbide mononitrate, ketoconazole, metformin, metoprolol tartrate, multiple vitamins-minerals, fish oil, ondansetron, onetouch delica lancets 33g, pantoprazole, pentoxifylline, ranolazine, sitagliptin, torsemide, warfarin, medical compression stockings, ferrous sulfate, nitroglycerin, and warfarin.  Allergies: is allergic to lisinopril.     Review of Systems:  General- denies fever/chills  HEENT- denies hearing loss or sore throat  Eyes- denies eye pain or visual disturbances, denies red eyes  Respiratory- denies cough or SOB  Cardio- denies chest pain or palpitations  GI- denies abdominal pain  Endocrine- denies urinary frequency  Urinary- denies pain with urination  Musculoskeletal- Negative except noted above  Skin- denies rashes or wounds  Neurological- denies dizziness or headache  Psychiatric- denies anxiety or difficulty concentrating    Physical Exam:   Ht 6' 2\" (1.88 m)   Wt 109 kg (240 lb)   BMI 30.81 kg/m²   General/Constitutional: No apparent distress: well-nourished and well developed.  Eyes: normal ocular motion  Cardio: RRR, Normal S1S2, No m/r/g  Lymphatic: No appreciable lymphadenopathy  Respiratory: Non-labored breathing, CTA b/l no w/c/r  Vascular: No edema, swelling or tenderness, except as noted in detailed exam.  Integumentary: No impressive skin lesions present, except as noted in detailed exam.  Neuro: No ataxia or tremors noted  Psych: Normal mood and affect, oriented to person, place and time. Appropriate affect.  Musculoskeletal: Normal, except as noted in detailed exam and in " HPI.    Examination    Right    Gait Antalgic   Musculoskeletal Tender to palpation at sole of foot, heel   Pes planus    Skin Normal.      Nails Normal    Range of Motion  20 degrees dorsiflexion, 30 degrees plantarflexion  Subtalar motion: normal    Stability Stable    Muscle Strength 5/5 tibialis anterior  5/5 gastrocnemius-soleus  5/5 posterior tibialis  5/5 peroneal/eversion strength  5/5 EHL  5/5 FHL    Neurologic Normal    Sensation  Intact but diminished to light touch throughout sural, saphenous, superficial peroneal, deep peroneal and medial/lateral plantar nerve distributions.  McLean-Patsy 5.07 filament (10g) testing  deferred.    Cardiovascular Brisk capillary refill < 2 seconds,intact DP and PT pulses    Special Tests None      Imaging Studies:   3 views of the right foot were taken, reviewed and interpreted independently that demonstrate plantar calcaneal heel spur. Reviewed by me personally.    Scribe Attestation      I,:  Joselyn Jimenez PA-C am acting as a scribe while in the presence of the attending physician.:       I,:  James R Lachman, MD personally performed the services described in this documentation    as scribed in my presence.:               James R. Lachman, MD  Foot & Ankle Surgery   Department of Orthopaedic Surgery  [facility]      I personally performed the service.    James R. Lachman, MD

## 2024-07-10 NOTE — PATIENT INSTRUCTIONS
Plantar Fasciitis   If your first few steps out of bed in the morning cause severe pain in the heel of your foot, you may have plantar fasciitis (fashee-EYE-tiss), an overuse injury that affects the sole of the foot. A diagnosis of plantar fasciitis means you have inflamed the tough, fibrous band of tissue (fascia) connecting your heel bone to the base of your toes.     You're more likely to develop the condition if you're female, overweight or have a job that requires a lot of walking or standing on hard surfaces. You're also at risk if you walk or run for exercise, especially if you have tight calf muscles that limit how far you can flex your ankles. People with very flat feet or very high arches also are more prone to plantar fasciitis.   The condition typically starts gradually with mild pain at the heel bone often referred to as a stone bruise. You're more likely to feel it after (not during) exercise. The pain classically occurs right after getting up in the morning and after a period of sitting.  If you don't treat plantar fasciitis, it may become a chronic condition. You may not be able to keep up your level of activity, and you may develop symptoms of foot, knee, hip and back problems because plantar fasciitis can change the way you walk.  Treatment  Most important- activity modification (reducing the number of steps you take each day, avoiding offending activities and performing any weightbearing activities in supportive shoes)  Gel heel cups  Physical Therapy  Night splints (if you have morning pain)  Stretching is the best treatment for plantar fasciitis. This is typically done with a Physical therapist. It may help to try to keep weight off your foot until the initial inflammation goes away. You can also apply ice to the sore area for 20 minutes three or four times a day to relieve your symptoms. Nonsteroidal anti-inflammatory medication such as ibuprofen or naproxen can be helpful on the bad days. Home  exercises to stretch your Achilles tendon and plantar fascia are the mainstay of treatment and reduce the chance of recurrence.       In one exercise, you lean forward against a wall with one knee straight and heel on the ground. Your other knee is bent. Your heel cord and foot arch stretch as you lean. Hold for 10 seconds, relax and straighten up. Repeat 20 times for each sore heel. It is important to keep the knee fully extended on the side being stretched.       In another exercise, you lean forward onto a countertop, spreading your feet apart with one foot in front of the other. Flex your knees and squat down, keeping your heels on the ground as long as possible. Your heel cords and foot arches will stretch as the heels come up in the stretch. Hold for 10 seconds, relax and straighten up. Repeat 20 times.  About 90 percent of people with plantar fasciitis improve significantly after two months of initial treatment. You may be advised to use shoes with shock-absorbing soles or fitted with an off-the-shelf shoe insert device like a rubber heel pad. Your foot may be taped into a specific position.        If your plantar fasciitis continues after a few months of conservative treatment, your doctor may inject your heel with steroidal anti-inflammatory medication.    If you still have symptoms, you may need to wear a walking cast for two to three weeks or a positional splint when you sleep. In a few cases, surgery is needed for chronically contracted tissue.   Plantar Fascia-Specific Stretching Program  Cross your affected leg over your other leg.   Using the hand on your affected side, take hold of your affected foot and pull your toes back towards shin. This creates tension/stretch in the arch of the foot/plantar fascia.   Check for the appropriate stretch position by gently rubbing the thumb of your unaffected side left to right over the arch of the affected foot. The plantar fascia should feel firm, like a guitar  string.   Hold the stretch for a count of 10. A set is 10 repetitions.          Perform at least three sets of stretches per day. You cannot perform the stretch too often. The most important times to stretch are before taking the first step in the morning and before standing after a period of prolonged sitting.  Anti-inflammatory Medication  Anti-inflammatory medications can help decrease the inflammation in the arch and heel of your foot. These medications include Advil®, Motrin®, Ibuprofen, and Aleve®.   Use the medication as directed on the package. If you tolerate it well, take it daily for two weeks then discontinue for one week. If symptoms worsen or return, resume for two weeks, then stop.   You should eat when taking these medications, as they can be hard on your stomach.   Arch Support  Over the counter inserts ([employer]® Cross Trainers®) provide added arch support and soft cushion.  These are typically not needed but can be tried if desired.  Based on the individual needs of your foot, custom inserts are rarely ever necessary   Additional Stretch: Achilles Tendon Stretch  Place a shoe insert under your affected foot.   Place your affected leg behind your unaffected leg with the toes of your back foot pointed towards the heel of your other foot.   Lean into the wall.   Bend your front knee while keeping your back leg straight with your heel firmly on the ground.   Hold the stretch for a count of 10. A set is 10 repetitions.   Perform the stretch at least three times a day.

## 2024-07-16 ENCOUNTER — EVALUATION (OUTPATIENT)
Dept: PHYSICAL THERAPY | Facility: CLINIC | Age: 79
End: 2024-07-16
Payer: COMMERCIAL

## 2024-07-16 DIAGNOSIS — E78.2 MIXED HYPERLIPIDEMIA: ICD-10-CM

## 2024-07-16 DIAGNOSIS — M79.672 HEEL PAIN, BILATERAL: ICD-10-CM

## 2024-07-16 DIAGNOSIS — M79.671 HEEL PAIN, BILATERAL: ICD-10-CM

## 2024-07-16 DIAGNOSIS — E11.51 TYPE 2 DIABETES MELLITUS WITH DIABETIC PERIPHERAL ANGIOPATHY WITHOUT GANGRENE, WITH LONG-TERM CURRENT USE OF INSULIN (HCC): ICD-10-CM

## 2024-07-16 DIAGNOSIS — Z79.4 TYPE 2 DIABETES MELLITUS WITH DIABETIC PERIPHERAL ANGIOPATHY WITHOUT GANGRENE, WITH LONG-TERM CURRENT USE OF INSULIN (HCC): ICD-10-CM

## 2024-07-16 DIAGNOSIS — M72.2 PLANTAR FASCIITIS, RIGHT: Primary | ICD-10-CM

## 2024-07-16 DIAGNOSIS — M79.672 FOOT PAIN, BILATERAL: ICD-10-CM

## 2024-07-16 DIAGNOSIS — M79.671 FOOT PAIN, BILATERAL: ICD-10-CM

## 2024-07-16 PROCEDURE — 97161 PT EVAL LOW COMPLEX 20 MIN: CPT

## 2024-07-16 PROCEDURE — 97110 THERAPEUTIC EXERCISES: CPT

## 2024-07-16 NOTE — PROGRESS NOTES
Progress Note - Cardiology Office  [facility]    Thomas Horne 79 y.o. male MRN: [id number]  : 1945  Encounter: [id number]      Assessment:     Coronary artery disease.  Chronic stable angina.  Chronic diastolic heart failure.  Essential hypertension.  Dyslipidemia.  PVCs.  Mild to moderate aortic valve stenosis.  Pulmonary hypertension.  CKD stage III.  AAA s/p EVAR.  Peripheral vascular disease.  LLE DVT.   Iron deficiency anemia.  Type II diabetes.  Obstructive sleep apnea.    Discussion Summary and Plan:    Coronary artery disease.  - s/p CABG x 3 () and s/p PCI with NANDINI to distal Lcx ().   - Patient with chronic stable angina.  He reports that his episodes of chest pain with exertion are less frequent given recent increase in Ranexa and Imdur.  - 10/19/22 cardiac catheterization: three-vessel coronary artery disease with 100% occluded right coronary artery, 100% occluded mid LAD, vein graft to RCA is 100% occluded with RCA has some collaterals from left circulation. Lima to LAD is widely patent. Most likely graft to circumflex is also 100% occluded as no competitive flow is noted.     Prox LAD to Mid LAD lesion is 100% stenosed. Lima to LAD is widely patent  Prox RCA lesion is 100% stenosed. RCA graft is 100% occluded  Mid Cx to Dist Cx lesion is 40% stenosed.  Ost Cx to Prox Cx lesion is 40% stenosed.  1st Mrg lesion is 99% stenosed. Which appeared to be a bypassed vessel and bypassed seems to be 100% occluded could not visualized.  Origin to Prox Graft lesion is 100% stenosed. RCA graft is 100% occluded  Distal circ stent is widely patent  Patient has grade 1 collaterals from left circulation to distal right coronary artery  Patient has severe three-vessel coronary artery disease with totally occluded 100% RCA, mid LAD and a medium size obtuse marginal. Patent stent seen in distal circumflex. Nonobstructive disease noted of left main and circumflex coronary artery.  Vein graft to RCA is 100% occluded. And LIMA to LAD is widely patent.    - Continue aspirin 81 mg daily.  - Continue Lopressor 25 mg twice daily.  - Continue nitroglycerin as needed.  - Continue Ranexa 1000 mg twice daily.  - Continue Imdur 90 mg daily.    Chronic stable angina.  -  Patient reports since last outpatient cardiology office visit he has overall been doing well. He states since last office visit he has only needed to use nitroglycerin once.  He states that he been able to do daily activities without chest pain.  - Continue nitroglycerin as needed.  - Continue Ranexa 1000 mg twice daily.  - Continue Imdur 90 mg daily.    Chronic diastolic heart failure.  - Does not appear in acute exacerbation.  Patient is euvolemic on examination.  - 6/17/23 TTE: LVEF 50%. Diastolic function is moderately abnormal, consistent with grade II (pseudonormal) relaxation. Left atrial filling pressure is elevated.  Left atrium moderately dilated. Aortic Valve: The aortic valve is trileaflet. The leaflets are moderately thickened. The leaflets are moderately calcified. The leaflets exhibit normal mobility. There is mild to moderate stenosis. The aortic valve peak velocity is 3.2 m/s. The aortic valve peak gradient is 41 mmHg. The aortic valve mean gradient is 25 mmHg.  Mild mitral valve regurgitation. Tricuspid Valve: There is mild regurgitation. The right ventricular systolic pressure is moderately to severely elevated at 50-60mmHg.   - Currently on Lopressor 25 mg twice daily, amlodipine 5 mg daily, clonidine 0.1 mg twice daily, Imdur 90 mg daily, and torsemide 20 mg daily.  - Currently on Jardiance 25 mg daily.  - CHF education.     Essential hypertension.  - BP during today's office visit, 130/62.   - Continue Lopressor 25 mg twice daily, amlodipine 5 mg daily, clonidine 0.1 mg twice daily, Imdur 90 mg daily, and torsemide 20 mg daily.  - 6/17/23 TTE: LVEF 50%. Diastolic function is moderately abnormal, consistent with  grade II (pseudonormal) relaxation. Left atrial filling pressure is elevated.  Left atrium moderately dilated. Aortic Valve: The aortic valve is trileaflet. The leaflets are moderately thickened. The leaflets are moderately calcified. The leaflets exhibit normal mobility. There is mild to moderate stenosis. The aortic valve peak velocity is 3.2 m/s. The aortic valve peak gradient is 41 mmHg. The aortic valve mean gradient is 25 mmHg.  Mild mitral valve regurgitation. Tricuspid Valve: There is mild regurgitation. The right ventricular systolic pressure is moderately to severely elevated at 50-60mmHg.     Dyslipidemia.  - 4/17/24 lipid panel: Cholesterol 131, triglycerides 113, HDL 54, LDL 57.  - Currently on Lipitor 40 mg daily, fenofibrate 160 mg daily, and omega-3 fatty acids.    PVCs.  - Asymptomatic.  Patient denies experiencing palpitations.  - Currently on Lopressor 25 mg twice daily.    Mild to moderate aortic valve stenosis.  - 6/17/23 TTE:  Aortic Valve The aortic valve is trileaflet. The leaflets are moderately thickened. The leaflets are moderately calcified. The leaflets exhibit normal mobility. There is no evidence of regurgitation. There is mild to moderate stenosis. The aortic valve peak velocity is 3.2 m/s. The aortic valve peak gradient is 41 mmHg. The aortic valve mean gradient is 25 mmHg.   - 7/26/22 TTE:   Aortic Valve The aortic valve is trileaflet. The leaflets are moderately thickened. The leaflets are moderately calcified. The leaflets exhibit normal mobility. There is no evidence of regurgitation. There is mild stenosis.  Mean/peak gradient is 14/27 mm Hg, JEFF is 1.8 cm2, DVI is 0.52     Pulmonary hypertension.  - 6/17/23 TTE: The right ventricular systolic pressure is moderately to severely elevated at 50-60mmHg.     CKD stage III.  - Baseline creatinine appears between 1.6 and 1.8.  - Follows outpatient with [facility].    AAA s/p EVAR.  - s/p EVAR in 2012.   - 6/17/23 CT  abdomen/pelvis without contrast: Stent graft is identified. The native aneurysm is similar in size measuring approximately 5.2 cm as compared to March.   - Follows outpatient with [facility] vascular surgery.    Peripheral vascular disease.  - S/p right CFA to peroneal artery bypass with reversed CryoVein (11/2023).   - Follows outpatient with [facility] vascular surgery.    LLE DVT.   - 4/18/24 VAS venous duplex-bilateral lower limb: Left lower extremity- acute non-occlusive deep vein thrombosis noted in the gastrocnemius and peroneal veins.  - Currently on warfarin managed by PCP.    Iron deficiency anemia.  - Follows outpatient with [facility] Hematology/Oncology.     Type II diabetes.  - 4/17/24 HgbA1c: 7.0.   - Currently on Jardiance 25 mg daily.  - Care per PCP.     Obstructive sleep apnea.  - Patient reports he is not able to tolerate CPAP mask.      Patient / Caretaker was advised and educated to call our office  immediately if  patient has any new symptoms of chest pain/shortness of breath, near-syncope, syncope, light headedness sustained palpitations  or any other cardiovascular symptoms before their scheduled follow-up appointment.  Office number was provided #[phone].      Please call [phone] if any questions.  Counseling :      A description of the counseling.  Goals and Barriers.  Patient's ability to self care: Yes  Medication side effect reviewed with patient in detail and all their questions answered to their satisfaction.    HPI :     Thomas Horne is a 79 y.o. male with PMHx of CAD s/p CABG x 3 (2002) and s/p PCI with NANDINI to distal Lcx (2021), chronic stable angina, chronic diastolic heart failure, essential HTN, dyslipidemia, PVCs, mild to moderate aortic valve stenosis, pulmonary hypertension, CKDIII, AAA s/p EVAR (2012), PVD s/p right lower extremity bypass (11/2023), iron deficiency anemia, LLE DVT, DMII, who presents for routine outpatient cardiology follow-up.     7/17/24:          Patient presents for routine outpatient cardiology follow-up.  Patient was last seen outpatient cardiology office on 5/10/24.  Patient had reported experiencing episode of chest pain/pressure on 5/7/24.       5/10/24 cardiology notes indicate that patient was instructed to take nitroglycerin as needed, patient had disclosed that he had not been taking nitroglycerin as needed for chest discomfort/pain.  Patient's Ranexa 500 mg twice daily was increased to Ranexa 100 mg twice daily and patient's Imdur 60 mg daily was increased to 90 mg daily.      Patient reports since last outpatient cardiology office visit he has overall been doing well but is currently dealing with plantar fasciitis. He states since last office visit he has only needed to use nitroglycerin once.  He states that he been able to do daily activities without chest pain.      5/10/24:        Patient presents for routine outpatient cardiology follow-up and evaluation for chest pain.    Patient states since November 2023, particularly after his right lower extremity bypass procedure, he has noted episodes of chest pressure with exertion.  He states that he only experiences episodes of chest pressure with exertion.  At rest he denies chest pain/pressure.  He states that he had a significant episode of chest pain/pressure on 5/07/24 after walking to and from the hospital for iron transfusions.  He states that his chest pain/pressure was localized to the center of his chest and resolved after few minutes with rest.  He denies episodes of chest pain/pressure associated with shortness of breath, palpitations, lightheadedness, dizziness, headache, nausea, vomiting.      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)     Plantar fasciitis     Sleep apnea     Could not tolerate CPAP     Past Surgical History:   Procedure Laterality Date    ADENOIDECTOMY      CARDIAC CATHETERIZATION Left 10/19/2022    Procedure: Cardiac Left Heart Cath;  Surgeon: Danielle Pereira MD;  Location: [facility];  Service: Cardiology    CARDIAC SURGERY  2002    3 cardiac bypass then angioplasty 2020    CHOLECYSTECTOMY      COLONOSCOPY  2024    CORONARY ARTERY BYPASS GRAFT      IR LOWER EXTREMITY ANGIOGRAM  2023    HI BYPASS W/VEIN FEMORAL-POPLITEAL Right 2023    Procedure: BYPASS FEMORAL-POPLITEAL WITH CRYO VEIN, RIGHT FEMORAL ENDARTERECTOMY;  Surgeon: Vasquez Clark MD;  Location: [facility];  Service: Vascular    HI SLCTV CATHJ 3RD+ ORD SLCTV ABDL PEL/LXTR BRNCH Right 2023    Procedure: ARTERIOGRAM Right lower extremity arteriogram with CO2 via right groin access;  Surgeon: Vasquez Clark MD;  Location: [facility];  Service: Vascular    PROSTATE SURGERY      TONSILLECTOMY       Social History     Substance and Sexual Activity   Alcohol Use Yes    Alcohol/week: 14.0 standard drinks of alcohol    Types: 14 Cans of beer per week    Comment: 1 -2 daily     Social History     Substance and Sexual Activity   Drug Use Never     Social History     Tobacco Use   Smoking Status Former    Current packs/day: 0.00    Average packs/day: 1.5 packs/day for 33.0 years (49.5 ttl pk-yrs)    Types: Cigarettes    Start date:     Quit date:     Years since quittin.5    Passive exposure: Past   Smokeless Tobacco Never     Family History:   Family History   Problem Relation Age of Onset    Diabetes Mother     Alcohol abuse Father     Heart disease Brother     Cancer Sister          Thyroid    Cancer Sister         Colon    Cancer Brother         Throat    Cancer Brother     Diabetes Sister     Mental illness Neg Hx        Meds/Allergies     Allergies   Allergen Reactions    Lisinopril Rash and Lip Swelling       Current Outpatient Medications:     acetaminophen (TYLENOL) 325 mg tablet, Take 2 tablets (650 mg total) by mouth every 6 (six) hours as needed for mild pain, Disp: , Rfl: 0    amLODIPine (NORVASC) 10 mg tablet, Take 0.5 tablets (5 mg total) by mouth daily, Disp: 45 tablet, Rfl: 1    aspirin 81 mg chewable tablet, Chew 81 mg daily, Disp: , Rfl:     atorvastatin (LIPITOR) 40 mg tablet, Take 1 tablet (40 mg total) by mouth daily, Disp: 90 tablet, Rfl: 1    Blood Glucose Monitoring Suppl (OneTouch Verio Reflect) w/Device KIT, Check blood sugars twice daily. Please substitute with appropriate alternative as covered by patient's insurance. Dx: E11.65, Disp: 1 kit, Rfl: 0    cloNIDine (CATAPRES) 0.1 mg tablet, take 1 tablet by mouth every 12 hours, Disp: 180 tablet, Rfl: 1    Droplet Pen Needles 32G X 4 MM MISC, USE EVERY EVENING, Disp: 100 each, Rfl: 5    Empagliflozin (Jardiance) 25 MG TABS, TAKE 1 TABLET BY MOUTH DAILY, Disp: 90 tablet, Rfl: 1    fenofibrate 160 MG tablet, Take 1 tablet (160 mg total) by mouth daily, Disp: 90 tablet, Rfl: 3    gabapentin (NEURONTIN) 600 MG tablet, Take 1 tablet (600 mg total) by mouth 3 (three) times a day, Disp: 90 tablet, Rfl: 0    glimepiride (AMARYL) 2 mg tablet, Take 1 tablet (2 mg total) by mouth 2 (two) times a day, Disp: 180 tablet, Rfl: 3    glucose blood (OneTouch Verio) test strip, TEST TWICE A DAY, Disp: 200 strip, Rfl: 5    insulin glargine (LANTUS) 100 units/mL subcutaneous injection, Inject 15 Units under the skin daily at bedtime, Disp: 10 mL, Rfl: 0    isosorbide mononitrate (IMDUR) 30 mg 24 hr tablet, Take 3 tablets (90 mg total) by mouth daily, Disp: 270 tablet, Rfl: 1    ketoconazole (NIZORAL) 2 % cream, Apply topically daily, Disp: 60  g, Rfl: 1    metFORMIN (GLUCOPHAGE) 500 mg tablet, Take 1 tablet (500 mg total) by mouth 2 (two) times a day with meals, Disp: 180 tablet, Rfl: 1    metoprolol tartrate (LOPRESSOR) 50 mg tablet, Take 0.5 tablets (25 mg total) by mouth every 12 (twelve) hours, Disp: 90 tablet, Rfl: 1    Multiple Vitamins-Minerals (MULTIVITAMIN MEN 50+ PO), Take by mouth daily, Disp: , Rfl:     nitroglycerin (NITROSTAT) 0.4 mg SL tablet, Place 1 tablet (0.4 mg total) under the tongue every 5 (five) minutes as needed for chest pain, Disp: 30 tablet, Rfl: 0    Omega-3 Fatty Acids (fish oil) 1,000 mg, Take 4,000 mg by mouth 2 (two) times a day, Disp: , Rfl:     ondansetron (ZOFRAN) 4 mg tablet, Take 4 mg by mouth every 8 (eight) hours as needed for nausea or vomiting, Disp: , Rfl:     OneTouch Delica Lancets 33G MISC, Check blood sugars twice daily. Please substitute with appropriate alternative as covered by patient's insurance. Dx: E11.65, Disp: 200 each, Rfl: 3    pantoprazole (PROTONIX) 40 mg tablet, Take 1 tablet (40 mg total) by mouth daily, Disp: 90 tablet, Rfl: 1    pentoxifylline (TRENtal) 400 mg ER tablet, Take 1 tablet (400 mg total) by mouth 3 (three) times a day with meals, Disp: 90 tablet, Rfl: 2    ranolazine (RANEXA) 1000 MG SR tablet, Take 1 tablet (1,000 mg total) by mouth 2 (two) times a day, Disp: 180 tablet, Rfl: 3    sitaGLIPtin (JANUVIA) 100 mg tablet, Take 1 tablet (100 mg total) by mouth daily, Disp: 90 tablet, Rfl: 1    torsemide (DEMADEX) 20 mg tablet, TAKE 1 TABLET BY MOUTH DAILY, Disp: 90 tablet, Rfl: 1    warfarin (COUMADIN) 1 mg tablet, take 1 tablet by mouth once daily, Disp: 90 tablet, Rfl: 0    warfarin (COUMADIN) 3 mg tablet, take 1 tablet by mouth once daily, Disp: 90 tablet, Rfl: 0    Elastic Bandages & Supports (Medical Compression Stockings) MISC, Use daily Knee High 15-20mmHg (Patient not taking: Reported on 2/1/2024), Disp: 2 each, Rfl: 4    ferrous sulfate 324 (65 Fe) mg, Take 1 tablet (324 mg  "total) by mouth 2 (two) times a day before meals, Disp: 180 tablet, Rfl: 1    Vitals: Blood pressure 130/62, pulse 62, height 6' 2\" (1.88 m), weight 109 kg (241 lb), SpO2 98%.    Body mass index is 30.94 kg/m².  Wt Readings from Last 3 Encounters:   07/17/24 109 kg (241 lb)   07/10/24 109 kg (240 lb)   07/09/24 110 kg (242 lb 9.6 oz)     Vitals:    07/17/24 0931   Weight: 109 kg (241 lb)       BP Readings from Last 3 Encounters:   07/17/24 130/62   07/09/24 (!) 171/83   06/25/24 164/74       Physical Exam:  Physical Exam  Vitals reviewed.   Constitutional:       General: He is not in acute distress.  Cardiovascular:      Rate and Rhythm: Normal rate and regular rhythm.      Pulses: Normal pulses.      Heart sounds: Murmur heard.   Pulmonary:      Effort: Pulmonary effort is normal. No respiratory distress.      Breath sounds: Normal breath sounds.   Abdominal:      General: Abdomen is flat. There is no distension.      Palpations: Abdomen is soft.      Tenderness: There is no abdominal tenderness.   Musculoskeletal:      Right lower leg: No edema.      Left lower leg: No edema.   Skin:     General: Skin is warm and dry.   Neurological:      Mental Status: He is alert and oriented to person, place, and time.       Diagnostic Studies Review Cardio:      Cardiac testing:       Echo complete   Result date: 6/17/23    Left Ventricle Left ventricular cavity size is normal. Wall thickness is moderately increased. There is mild to moderate concentric hypertrophy. The left ventricular ejection fraction is 50%. Systolic function is mildly reduced.  There is mild global hypokinesis. Diastolic function is moderately abnormal, consistent with grade II (pseudonormal) relaxation.  Left atrial filling pressure is elevated.   Interventricular Septum There is abnormal septal motion consistent with post-operative status. There is sigmoid appearance of the septum.   Right Ventricle Right ventricular cavity size is normal. Systolic " function appears normal visually. Wall thickness is normal.   Left Atrium The atrium is moderately dilated.   Right Atrium The atrium is normal in size.   Aortic Valve The aortic valve is trileaflet. The leaflets are moderately thickened. The leaflets are moderately calcified. The leaflets exhibit normal mobility. There is no evidence of regurgitation. There is mild to moderate stenosis. The aortic valve peak velocity is 3.2 m/s. The aortic valve peak gradient is 41 mmHg. The aortic valve mean gradient is 25 mmHg.   Mitral Valve There is mild thickening. There is mild regurgitation. There is no evidence of stenosis.   Tricuspid Valve Tricuspid valve structure is normal. There is mild regurgitation. There is no evidence of stenosis. The right ventricular systolic pressure is moderately to severely elevated at 50-60mmHg.   Pulmonic Valve Pulmonic valve structure is normal. There is trace regurgitation. There is no evidence of stenosis.   Ascending Aorta The aortic root is mildly dilated.   IVC/SVC The inferior vena cava is dilated. Respirophasic changes were blunted (less than 50% variation).   Pericardium There is no pericardial effusion. The pericardium is normal in appearance. There is a left pleural effusion.       Lab Review   Lab Results   Component Value Date    WBC 5.94 05/22/2024    HGB 11.7 (L) 05/22/2024    HCT 39.8 05/22/2024    MCV 77 (L) 05/22/2024    RDW 26.7 (H) 05/22/2024     05/22/2024     BMP:  Lab Results   Component Value Date    SODIUM 140 05/22/2024    K 4.3 05/22/2024     05/22/2024    CO2 29 05/22/2024    BUN 33 (H) 05/22/2024    CREATININE 1.71 (H) 05/22/2024    GLUC 114 04/11/2024    GLUF 72 05/22/2024    CALCIUM 9.5 05/22/2024    CORRECTEDCA 9.1 11/21/2023    EGFR 36 05/22/2024    MG 2.2 04/17/2024     LFT:  Lab Results   Component Value Date    AST 21 04/17/2024    ALT 16 04/17/2024    ALKPHOS 38 04/17/2024    TP 8.1 04/17/2024    ALB 4.2 04/17/2024      Lab Results    Component Value Date    VQZ3GONYXNLT 1.536 06/17/2023     Lab Results   Component Value Date    HGBA1C 7.0 (H) 04/17/2024     Lipid Profile:   Lab Results   Component Value Date    CHOLESTEROL 131 04/17/2024    HDL 51 04/17/2024    LDLCALC 57 04/17/2024    TRIG 113 04/17/2024     Naa Cruz PA-C

## 2024-07-16 NOTE — PROGRESS NOTES
PT Evaluation     Today's date: 2024  Patient name: Thomas Horne  : 1945  MRN: [id number]  Referring provider: Lachman, James R, MD  Dx:   Encounter Diagnosis     ICD-10-CM    1. Plantar fasciitis, right  M72.2 Ambulatory Referral to Physical Therapy      2. Heel pain, bilateral  M79.671     M79.672       3. Foot pain, bilateral  M79.671     M79.672                      Assessment  Impairments: abnormal gait, abnormal or restricted ROM, activity intolerance, impaired balance, impaired physical strength, lacks appropriate home exercise program, pain with function, weight-bearing intolerance and poor body mechanics    Assessment details: Thomas Horne is a 79 y.o. male who presents with bilateral heel and plantar foot pain. Pt has a significant past medical hx including hx of diabetes, CHF and HTN controlled via medication. On examination, pt presents with impaired MTP AROM of bilateral feet, B/L ankle AROM, BLE flexibility, functional mobility, gait, balance, and activity tolerance. Due to these impairments, patient has difficulty standing, walking and navigating stairs affecting his ability to participate in house hold chores, car maintenance, work in garage, and community ambulation. Patient's clinical presentation is consistent with their referring diagnosis of Plantar fasciitis (right) and bilateral heel and foot pain. Patient has been educated in pathology, review of impairements, prognosis, activity modification, POC, and HEP. Patient would benefit from skilled physical therapy services to address their aforementioned functional limitations and progress towards prior level of function and independence with home exercise program.     Plan: Ambulatory Referral to Physical Therapy      Goals  Short Term Goals 4 WEEKS Target Date (2024)  1. Establish [place] w/ progressing HEP for ROM/strength.  2. Improve AROM ankle P/D by 5 degrees bilaterally to prepare for walking w/ heel to toe  progression.   3. Improve AROM R/L ankle inversion/eversion to 25 and 20 degrees or greater respectively to prepare for negotiation of uneven terrain.  4. Improve R ankle strength to 4+/5 or better for improved balance.     Long Term Goals 8 WEEKS: Target Date (10/8/2024)  1. Pt will be able to maintain tandem stance bilaterally for greater than or equal to 20 seconds for improved balance and decreased fall risk.  2. Pt will improve B/L hip abduction strength to 4/5 for improved WB tolerance w/ standing and walking.   3. 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   4. Ankle AROM WFL to allow recipcrocal stairs w/ handrail up flight of stairs to get into bedroom w/ less difficulty.  5. Pt will report no greater than 2/10 pain w/ ADLs for return to typical tasks without limitation.     Plan  Patient would benefit from: PT eval and skilled physical therapy  Planned modality interventions: cryotherapy, thermotherapy: hydrocollator packs and unattended electrical stimulation    Planned therapy interventions: manual therapy, neuromuscular re-education, therapeutic activities, therapeutic exercise, home exercise program, patient education, functional ROM exercises, strengthening and stretching    Frequency: 2-3x/week.  Duration in weeks: 12  Plan of Care beginning date: 7/16/2024  Plan of Care expiration date: 10/8/2024  Treatment plan discussed with: patient  Plan details: HEP development and progression, monitor patients adherence to activity modification to ensure adequate healing time/ recovery. Implement stretching, A/AA/PROM, joint mobilizations, posture education, STM/MI as needed to reduce muscle tension, muscle reeducation. Progress strengthening; improve pt's tolerance to resisted WB activities. PLOC discussed and agreed upon with patient.         Subjective Evaluation    History of Present Illness  Mechanism of injury: Pt is an 79 y.o. male who had  ortho appointment 7/10/24. He states that doctor informed him that he has plantar fascitis. He states that he has hx of diabetes; he had a nail in his R foot a year ago; he had vascular surgery in November. He states that he never full healed. He is not sure when the plantar fasciitis began because his R leg has been painful since the nail in his foot. He now has pain in both feet. Pain in L foot began a few months ago. Pain is located on the ball and heel of foot (heel pain greater than pain on ball of foot). He says that pain is equal bilaterally. He was provided cream to apply and the ortho gave him with a night splint for his R foot; none of this has provided relief as of yet. Pt is limited in his ability to stand and walk. He has projects at the house that he hasn't been able to do; he cannot work in his garage. He has pain walking to the car from his home. Stairs are difficult; he avoids them. He has stairs to enter his home and get to bedroom. He has not tried ice, heat, or oral medication. He has been walking on the balls of his feet to avoid pain and has to stop every few steps. Pt has pmhx of CHF, high BP and diabetes all managed via medication.     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 Calcaneal effusion with formation. No acute abnormality seen.  R ankle = No acute osseous abnormality. Moderate-sized calcaneal enthesophytes.   R foot = Mild midfoot degenerative changes in the right foot. Calcaneal enthesophyte formation..    Patient Goals  Patient goal: Get back to walking normally; return to ADLs  Pain  Current pain ratin (B/L feet)  At best pain ratin  At worst pain rating: 10  Location: B/L heels and plantar fascia  Quality: sharp and tight  Aggravating factors: standing, walking and stair climbing    Social Support  Steps to enter house: yes  4  Stairs in house: yes (To bedroom)       Diagnostic Tests  Abnormal x-ray: See subjective.  Treatments  Previous  "treatment: immobilization (Night splint/ topical cream)  Current treatment: physical therapy        Objective  Flowsheet Rows      Flowsheet Row Most Recent Value   PT/OT G-Codes    Current Score 38   Projected Score 58        AROM:   Long sit   R  L      7/16/2024 7/16/2024   Ankle DF calc/5thray  -5  -5  Ankle PF calc/5thray  40  40  Ankle inversion  10  20  Ankle eversion  15  25  Great toe ext   15  0  Great toe flex   Max lan Max lan     STRENGTH:     Long sit  R  L     7/16/2024 7/16/2024    Ankle DF  5/5  5/5  Ankle PF  4+/5  4+/5  Ankle inversion 4+/5  4+/5  Ankle eversion 4/5  4/5    Knee ext  5/5  5/5  Knee flex  4/5  4/5  Hip abduction  3-/5  4-/5  Hip extension  NT  NT    Observation/ palpation:   7/16/2024: Dry skin B/L lower leg, ankles and feet. Discoloration of lower leg (redness/blue) consistent w/ Hx of diabetes. Pt noted impared sensation d/t dx of diabetes. TTP at B/L heel. No TTP along PF of bilateral feet (greater restriction in soft tissue along R plantar fascia compared to L).     Gait:   7/16/2024: Lacks appropriate heel to toe progression. Lacks heel contact w/ ground. Antalgic.     Mechanical assessment:  7/16/2024: NT    Balance:  7/16/2024  FT w/ EO firm:  >30 sec  Tandem R post w/ EO: 3 sec  Tandem L post w/ EO: 6 sec    Function:  7/16/2024:   Squat: 3/4 depth, no pain reported  Step up/down 6\" step = able to complete without rail. Pt states that stairs are reciprocal for 4-5 steps at home, however he then has to transfer to step-to pattern due to pain.   Standing tolerance = 5-10 minutes  Walking tolerance = a few steps prior to needing rest. House to car is difficult. Pt feels like he is not walking straight.    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)     Plantar fasciitis     Sleep apnea     Could not tolerate CPAP     Past Surgical History:   Procedure Laterality Date    ADENOIDECTOMY      CARDIAC CATHETERIZATION Left 10/19/2022    Procedure: Cardiac Left Heart Cath;  Surgeon: Danielle Pereira MD;  [facility];  Service: Cardiology    CARDIAC SURGERY  2002    3 cardiac bypass then angioplasty 7/2020    CHOLECYSTECTOMY      COLONOSCOPY  02/14/2024    CORONARY ARTERY BYPASS GRAFT      IR LOWER EXTREMITY ANGIOGRAM  11/01/2023    AL BYPASS W/VEIN FEMORAL-POPLITEAL Right 11/16/2023    Procedure: BYPASS FEMORAL-POPLITEAL WITH CRYO VEIN, RIGHT FEMORAL ENDARTERECTOMY;  Surgeon: Vasquez Clark MD;  [facility];  Service: Vascular    AL SLCTV CATHJ 3RD+ ORD SLCTV ABDL PEL/LXTR BRNCH Right 11/01/2023    Procedure: ARTERIOGRAM Right lower extremity arteriogram with CO2 via right groin access;  Surgeon: Vasquez Clark MD;  [facility];  Service: Vascular    PROSTATE SURGERY      TONSILLECTOMY           SOC: 7/16/2024  FOTO: 7/16/2024  POC Expiration: 10/8/2024  Daily Treatment Log  Date: Initial Evaluation  7/16/2024 Next session         Visit#/ auth: 1           Objective Measures                           Manuals              PF rolling on therabar              STM to PF                                         Neuro Re-Ed             Toe scrunches/ extension              [place] / drop             backward walk at rail (as tolerable)                                                                     Ther Ex             Gastroc stretch w/ sos  Long sit 30\" x 2 R/L           Big toe stretch w/ sos      "        Seated HS stretch        Seated HR/TR  1 x 15 ea.           Seated [place] board ankle AROM cw/ccw                                                        EDUCATION: Pathology, review of impairements, prognosis, activity modification, POC, and HEP  KE           Ther Activity              RDL for active HS stretch              STS             Gait Training                                         Modalities                                         HEP:  Access Code: [email or web address]  URL: [email or web address]  Date: 07/16/2024  Prepared by: Michell Szymanski    Exercises  - Seated Heel Raise  - 2 x daily - 1 sets - 15 reps  - Seated Toe Raise  - 2 x daily - 1 sets - 15 reps  - Seated Gastroc Stretch with Strap  - 2 x daily - 3 sets - 30 seconds hold

## 2024-07-17 ENCOUNTER — OFFICE VISIT (OUTPATIENT)
Dept: CARDIOLOGY CLINIC | Facility: CLINIC | Age: 79
End: 2024-07-17
Payer: COMMERCIAL

## 2024-07-17 VITALS
WEIGHT: 241 LBS | SYSTOLIC BLOOD PRESSURE: 130 MMHG | HEART RATE: 62 BPM | HEIGHT: 74 IN | DIASTOLIC BLOOD PRESSURE: 62 MMHG | OXYGEN SATURATION: 98 % | BODY MASS INDEX: 30.93 KG/M2

## 2024-07-17 DIAGNOSIS — I25.10 CORONARY ARTERY DISEASE INVOLVING NATIVE CORONARY ARTERY OF NATIVE HEART WITHOUT ANGINA PECTORIS: Primary | ICD-10-CM

## 2024-07-17 PROCEDURE — 99214 OFFICE O/P EST MOD 30 MIN: CPT | Performed by: PHYSICIAN ASSISTANT

## 2024-07-17 RX ORDER — SITAGLIPTIN 100 MG/1
100 TABLET, FILM COATED ORAL DAILY
Qty: 100 TABLET | Refills: 1 | Status: SHIPPED | OUTPATIENT
Start: 2024-07-17

## 2024-07-17 RX ORDER — FENOFIBRATE 160 MG/1
160 TABLET ORAL DAILY
Qty: 100 TABLET | Refills: 1 | Status: SHIPPED | OUTPATIENT
Start: 2024-07-17

## 2024-07-18 ENCOUNTER — OFFICE VISIT (OUTPATIENT)
Dept: PHYSICAL THERAPY | Facility: CLINIC | Age: 79
End: 2024-07-18
Payer: COMMERCIAL

## 2024-07-18 DIAGNOSIS — M72.2 PLANTAR FASCIITIS, RIGHT: Primary | ICD-10-CM

## 2024-07-18 DIAGNOSIS — M79.672 HEEL PAIN, BILATERAL: ICD-10-CM

## 2024-07-18 DIAGNOSIS — M79.671 FOOT PAIN, BILATERAL: ICD-10-CM

## 2024-07-18 DIAGNOSIS — M79.672 FOOT PAIN, BILATERAL: ICD-10-CM

## 2024-07-18 DIAGNOSIS — M79.671 HEEL PAIN, BILATERAL: ICD-10-CM

## 2024-07-18 PROCEDURE — 97112 NEUROMUSCULAR REEDUCATION: CPT

## 2024-07-18 PROCEDURE — 97110 THERAPEUTIC EXERCISES: CPT

## 2024-07-18 NOTE — PROGRESS NOTES
Daily Note     Today's date: 2024  Patient name: Thomas Horne  : 1945  MRN: [id number]  Referring provider: Lachman, James R, MD  Dx:   Encounter Diagnosis     ICD-10-CM    1. Plantar fasciitis, right  M72.2       2. Heel pain, bilateral  M79.671     M79.672       3. Foot pain, bilateral  M79.671     M79.672                      Subjective: Pt states that he has been doing his home exercise program. Pain on arrival to session is about the same as on IE.       Objective: See treatment diary below      Assessment: Pt unable to achieve MTP flex AROM of bilaterally feet necessary for marble ; plan to trial this in future sessions to assess progress in ROM and coordination. Pt challenged by seated ankle alphabet ROM; transitioned to PF/DF and inversion/eversion. Pt unable to complete ankle [place] board in clockwise circles; regressed exercise to 4 way taps (forward, backward, and laterally). Tolerated treatment well. Patient would benefit from continued PT.       Plan: Continue per plan of care.  Progress treatment as tolerated.       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)     Plantar fasciitis     Sleep apnea     Could not tolerate CPAP     Past Surgical History:   Procedure Laterality Date    ADENOIDECTOMY      CARDIAC CATHETERIZATION Left 10/19/2022    Procedure: Cardiac Left Heart Cath;  Surgeon: Danielle Pereira MD;  [facility];  Service: Cardiology    CARDIAC SURGERY  2002    3 cardiac bypass then angioplasty 2020     "CHOLECYSTECTOMY      COLONOSCOPY  02/14/2024    CORONARY ARTERY BYPASS GRAFT      IR LOWER EXTREMITY ANGIOGRAM  11/01/2023    AL BYPASS W/VEIN FEMORAL-POPLITEAL Right 11/16/2023    Procedure: BYPASS FEMORAL-POPLITEAL WITH CRYO VEIN, RIGHT FEMORAL ENDARTERECTOMY;  Surgeon: Vasquez Clark MD;  [facility];  Service: Vascular    AL SLCTV CATHJ 3RD+ ORD SLCTV ABDL PEL/LXTR BRNCH Right 11/01/2023    Procedure: ARTERIOGRAM Right lower extremity arteriogram with CO2 via right groin access;  Surgeon: Vasquez Clark MD;  [facility];  Service: Vascular    PROSTATE SURGERY      TONSILLECTOMY           SOC: 7/16/2024  FOTO: 7/16/2024  POC Expiration: 10/8/2024  Daily Treatment Log  Date: Initial Evaluation  7/16/2024 7/18/2024  Next session       Visit#/ auth: 1  2         Objective Measures                           Manuals              PF rolling on therabar   1 x 10 R/L; instructed not to perform over heel d/t pain          STM to PF                                        Neuro Re-Ed    10'         Toe scrunches/ extension    20x ea. B/L          [place] / drop    Unable to lift marble B/L         backward walk at rail (as tolerable)                                                                     Ther Ex    30'         Big toe stretch w/ sos    10\" x 4 R/L         Seated gastroc stretch Long sit 30\" x 2 R/L 30\" x 2 R/L      Seated HR/TR  1 x 15 ea.  1 x 15 ea.         Seated [place] board ankle AROM   1 x 10 fwb/bwk/lateral R/L         4 way ankle AROM  1 x 10 ea. Seated B/L      Seated heels slides     5\" x 10 R/L                                     EDUCATION: Pathology, review of impairements, prognosis, activity modification, POC, and HEP  KE HEP updated and reviewed         Ther Activity             RDL for active HS stretch              STS             Gait Training                                         Modalities                                       "   HEP:  Access Code: [email or web address]  URL: [email or web address]  Date: 07/18/2024  Prepared by: Michell Szymanski    Exercises  - Seated Heel Raise  - 2 x daily - 1 sets - 15 reps  - Seated Toe Raise  - 2 x daily - 1 sets - 15 reps  - Seated Gastroc Stretch with Strap  - 2 x daily - 3 sets - 30 seconds hold  - Seated Toe Curl  - 2 x daily - 1 sets - 20 reps  - Supine Ankle Dorsiflexion and Plantarflexion AROM  - 2 x daily - 1 sets - 10 reps  - Supine Ankle Inversion and Eversion AROM  - 2 x daily - 1 sets - 10 reps

## 2024-07-22 ENCOUNTER — LAB (OUTPATIENT)
Dept: LAB | Facility: CLINIC | Age: 79
End: 2024-07-22
Payer: COMMERCIAL

## 2024-07-22 DIAGNOSIS — I82.452 ACUTE DEEP VEIN THROMBOSIS (DVT) OF LEFT PERONEAL VEIN (HCC): ICD-10-CM

## 2024-07-22 DIAGNOSIS — N18.32 STAGE 3B CHRONIC KIDNEY DISEASE (HCC): ICD-10-CM

## 2024-07-22 LAB
ALBUMIN SERPL BCG-MCNC: 4.2 G/DL (ref 3.5–5)
ANION GAP SERPL CALCULATED.3IONS-SCNC: 11 MMOL/L (ref 4–13)
BUN SERPL-MCNC: 26 MG/DL (ref 5–25)
CALCIUM SERPL-MCNC: 9.6 MG/DL (ref 8.4–10.2)
CHLORIDE SERPL-SCNC: 100 MMOL/L (ref 96–108)
CO2 SERPL-SCNC: 25 MMOL/L (ref 21–32)
CREAT SERPL-MCNC: 1.5 MG/DL (ref 0.6–1.3)
ERYTHROCYTE [DISTWIDTH] IN BLOOD BY AUTOMATED COUNT: 20.1 % (ref 11.6–15.1)
FERRITIN SERPL-MCNC: 91 NG/ML (ref 24–336)
GFR SERPL CREATININE-BSD FRML MDRD: 43 ML/MIN/1.73SQ M
GLUCOSE P FAST SERPL-MCNC: 235 MG/DL (ref 65–99)
HCT VFR BLD AUTO: 44.6 % (ref 36.5–49.3)
HGB BLD-MCNC: 14 G/DL (ref 12–17)
INR PPP: 3.06 (ref 0.84–1.19)
IRON SATN MFR SERPL: 5 % (ref 15–50)
IRON SERPL-MCNC: 15 UG/DL (ref 50–212)
MAGNESIUM SERPL-MCNC: 2 MG/DL (ref 1.9–2.7)
MCH RBC QN AUTO: 25.9 PG (ref 26.8–34.3)
MCHC RBC AUTO-ENTMCNC: 31.4 G/DL (ref 31.4–37.4)
MCV RBC AUTO: 83 FL (ref 82–98)
PHOSPHATE SERPL-MCNC: 2.4 MG/DL (ref 2.3–4.1)
PLATELET # BLD AUTO: 213 THOUSANDS/UL (ref 149–390)
PMV BLD AUTO: 11 FL (ref 8.9–12.7)
POTASSIUM SERPL-SCNC: 4.5 MMOL/L (ref 3.5–5.3)
PROTHROMBIN TIME: 31 SECONDS (ref 11.6–14.5)
RBC # BLD AUTO: 5.4 MILLION/UL (ref 3.88–5.62)
SODIUM SERPL-SCNC: 136 MMOL/L (ref 135–147)
TIBC SERPL-MCNC: 319 UG/DL (ref 250–450)
UIBC SERPL-MCNC: 304 UG/DL (ref 155–355)
WBC # BLD AUTO: 8.29 THOUSAND/UL (ref 4.31–10.16)

## 2024-07-22 PROCEDURE — 83550 IRON BINDING TEST: CPT

## 2024-07-22 PROCEDURE — 83540 ASSAY OF IRON: CPT

## 2024-07-22 PROCEDURE — 85610 PROTHROMBIN TIME: CPT

## 2024-07-22 PROCEDURE — 85027 COMPLETE CBC AUTOMATED: CPT

## 2024-07-22 PROCEDURE — 82728 ASSAY OF FERRITIN: CPT

## 2024-07-22 PROCEDURE — 83735 ASSAY OF MAGNESIUM: CPT

## 2024-07-22 PROCEDURE — 80069 RENAL FUNCTION PANEL: CPT

## 2024-07-22 PROCEDURE — 36415 COLL VENOUS BLD VENIPUNCTURE: CPT

## 2024-07-23 ENCOUNTER — TELEPHONE (OUTPATIENT)
Dept: FAMILY MEDICINE CLINIC | Facility: CLINIC | Age: 79
End: 2024-07-23

## 2024-07-23 ENCOUNTER — TELEPHONE (OUTPATIENT)
Age: 79
End: 2024-07-23

## 2024-07-23 ENCOUNTER — ANTICOAG VISIT (OUTPATIENT)
Dept: FAMILY MEDICINE CLINIC | Facility: CLINIC | Age: 79
End: 2024-07-23

## 2024-07-23 ENCOUNTER — OFFICE VISIT (OUTPATIENT)
Dept: PHYSICAL THERAPY | Facility: CLINIC | Age: 79
End: 2024-07-23
Payer: COMMERCIAL

## 2024-07-23 DIAGNOSIS — M79.672 FOOT PAIN, BILATERAL: ICD-10-CM

## 2024-07-23 DIAGNOSIS — M79.671 HEEL PAIN, BILATERAL: ICD-10-CM

## 2024-07-23 DIAGNOSIS — M79.672 HEEL PAIN, BILATERAL: ICD-10-CM

## 2024-07-23 DIAGNOSIS — I82.452 ACUTE DEEP VEIN THROMBOSIS (DVT) OF LEFT PERONEAL VEIN (HCC): Primary | ICD-10-CM

## 2024-07-23 DIAGNOSIS — D64.9 SYMPTOMATIC ANEMIA: Primary | ICD-10-CM

## 2024-07-23 DIAGNOSIS — M79.671 FOOT PAIN, BILATERAL: ICD-10-CM

## 2024-07-23 DIAGNOSIS — M72.2 PLANTAR FASCIITIS, RIGHT: Primary | ICD-10-CM

## 2024-07-23 PROCEDURE — 97110 THERAPEUTIC EXERCISES: CPT

## 2024-07-23 PROCEDURE — 97112 NEUROMUSCULAR REEDUCATION: CPT

## 2024-07-23 RX ORDER — WARFARIN SODIUM 2.5 MG/1
TABLET ORAL
Qty: 90 TABLET | Refills: 1 | Status: ON HOLD | OUTPATIENT
Start: 2024-07-23 | End: 2024-09-03 | Stop reason: ALTCHOICE

## 2024-07-23 NOTE — TELEPHONE ENCOUNTER
PA reviewed labs   No IV iron indicated at this time  F/U appt rescheduled to 8/26/2024  1240 at the [place] office with PA with labs before  Voicemail left for patient with the above information and to call the [facility] to confirm

## 2024-07-23 NOTE — PROGRESS NOTES
Daily Note     Today's date: 2024  Patient name: Thomas Horne  : 1945  MRN: [id number]  Referring provider: Lachman, James R, MD  Dx:   Encounter Diagnosis     ICD-10-CM    1. Plantar fasciitis, right  M72.2       2. Heel pain, bilateral  M79.671     M79.672       3. Foot pain, bilateral  M79.671     M79.672                      Subjective: Pt states that current coloration of feet is his norm. He had visit with podiatrist 24. He is not sure when his next PCP appointment is; he follows up with PCP regularly for feet checks due to pmhx. Pt states that his feet are feeling a bit better.       Objective: See treatment diary below    Observation: Bluish/ red coloration of B/L feet and toes; greater at toes. Skin abrasion noted on L second toe at plantar surface of distal phalanx. Made pt aware; pt able to view this portion of foot in sitting.       Assessment: Instructed pt to continue to monitor coloration and sensation of B/L feet and report to MD if condition progress. Pt instructed to monitor second phalange of L foot and report to MD if needed. Pt required cues to limit hip/ knee involvement w/ supine ankle AROM with bolster under lower leg. No complaint of pain w/ plantar fascia therabar rolling today. Tolerated treatment well. Patient would benefit from continued PT.       Plan: Continue per plan of care.  Progress treatment as tolerated.       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)     Plantar fasciitis     Sleep apnea     Could not tolerate  "CPAP     Past Surgical History:   Procedure Laterality Date    ADENOIDECTOMY      CARDIAC CATHETERIZATION Left 10/19/2022    Procedure: Cardiac Left Heart Cath;  Surgeon: Danielle Pereira MD;  [facility];  Service: Cardiology    CARDIAC SURGERY  2002    3 cardiac bypass then angioplasty 7/2020    CHOLECYSTECTOMY      COLONOSCOPY  02/14/2024    CORONARY ARTERY BYPASS GRAFT      IR LOWER EXTREMITY ANGIOGRAM  11/01/2023    AL BYPASS W/VEIN FEMORAL-POPLITEAL Right 11/16/2023    Procedure: BYPASS FEMORAL-POPLITEAL WITH CRYO VEIN, RIGHT FEMORAL ENDARTERECTOMY;  Surgeon: Vasquez Clark MD;  [facility];  Service: Vascular    AL SLCTV CATHJ 3RD+ ORD SLCTV ABDL PEL/LXTR BRNCH Right 11/01/2023    Procedure: ARTERIOGRAM Right lower extremity arteriogram with CO2 via right groin access;  Surgeon: Vasquez Clark MD;  [facility];  Service: Vascular    PROSTATE SURGERY      TONSILLECTOMY           SOC: 7/16/2024  FOTO: 7/16/2024  POC Expiration: 10/8/2024  Daily Treatment Log  Date: Initial Evaluation  7/16/2024 7/18/2024 7/23/2024  Next session     Visit#/ auth: 1  2  3       Objective Measures                           Manuals              PF rolling on therabar   1 x 10 R/L; instructed not to perform over heel d/t pain 1 x 20 midfoot B/L         STM to PF                                        Neuro Re-Ed    10'  10'       Toe scrunches/ extension    20x ea. B/L  20x ea. B/L, 2x; alt w/ HR and TR        [place] / drop    Unable to lift marble B/L  Held       backward walk at rail (as tolerable)     Forward strides: at window sill 6' x 4 laps w/ uni UE support        tandem stance       Semi tandem 30\" x 1 R/L w/ CS; full tandem 30\" x 1 R/L w/ CS                                                 Ther Ex    30'  30'       LAQ w/ DF    2 x 5 R/L      Big toe stretch w/ sos    10\" x 4 R/L 10\" x 4 R/L       Seated gastroc stretch Long sit 30\" x 2 R/L 30\" x 2 R/L " "10\" x 4 R/L w/ sos     Seated HR/TR  1 x 15 ea.  1 x 15 ea.  2 x 15 ea.; alt w/ toe scrunches       Seated [place] board ankle AROM   1 x 10 fwb/bwk/lateral R/L         4 way ankle AROM  1 x 10 ea. Seated B/L 1 x 20 DF/PF and inv/ev w/ heel prop R/L supine w/ HOB elevated for comfort     Seated heels slides     5\" x 10 R/L 1 x 15 R/L       EDUCATION: Pathology, review of impairements, prognosis, activity modification, POC, and HEP  KE HEP updated and reviewed         Ther Activity             RDL for active HS stretch              STS             Gait Training                                         Modalities                                         HEP:  Access Code: [email or web address]  URL: [email or web address]  Date: 07/18/2024  Prepared by: Michell Szymanski    Exercises  - Seated Heel Raise  - 2 x daily - 1 sets - 15 reps  - Seated Toe Raise  - 2 x daily - 1 sets - 15 reps  - Seated Gastroc Stretch with Strap  - 2 x daily - 3 sets - 30 seconds hold  - Seated Toe Curl  - 2 x daily - 1 sets - 20 reps  - Supine Ankle Dorsiflexion and Plantarflexion AROM  - 2 x daily - 1 sets - 10 reps  - Supine Ankle Inversion and Eversion AROM  - 2 x daily - 1 sets - 10 reps       "

## 2024-07-23 NOTE — TELEPHONE ENCOUNTER
"Call from Thomas. He saw the results of his blood work and that his iron studies are still low. His question was \"Now what?\". Last iron infusion was 6/25/24.    Explained that I would forward his question to the provider to address. He had no other questions/ concerns at this time.  "

## 2024-07-23 NOTE — TELEPHONE ENCOUNTER
Patient called- he had labs drawn yesterday and would like Dr. Fischer to review them and give him a call with the results.     He is concerned about his iron labs.     Call back number is [phone].

## 2024-07-23 NOTE — TELEPHONE ENCOUNTER
I called the patient and we spoke over the phone.   Recent laboratory data were reviewed.     Lab Results   Component Value Date    SODIUM 136 07/22/2024    K 4.5 07/22/2024     07/22/2024    CO2 25 07/22/2024    BUN 26 (H) 07/22/2024    CREATININE 1.50 (H) 07/22/2024    GLUC 114 04/11/2024    CALCIUM 9.6 07/22/2024     Lab Results   Component Value Date    IRON 15 (L) 07/22/2024    TIBC 319 07/22/2024    FERRITIN 91 07/22/2024     Lab Results   Component Value Date    WBC 8.29 07/22/2024    HGB 14.0 07/22/2024    HCT 44.6 07/22/2024    MCV 83 07/22/2024     07/22/2024     We discussed that his renal function is stable.  His iron levels remain low but his hemoglobin is up to 14.  He is seeing hematology soon.     Plan:  No need for IV iron at this time.   Continue with oral iron.

## 2024-07-23 NOTE — TELEPHONE ENCOUNTER
Will send to PA to review labs per patient request and advise on need for IV iron   Patient is scheduled for heme f/u on 8/5/2024 with Dr Main that will need to be rescheduled with a different provider

## 2024-07-23 NOTE — TELEPHONE ENCOUNTER
----- Message from Cathy GREY sent at 7/23/2024 11:48 AM EDT -----  You are still managing inr- he Is taking 3mg daily currently    Cathy Myers  CMA

## 2024-07-23 NOTE — RESULT ENCOUNTER NOTE
What is pt's current dose of coumadin - it looks like the last data we have in chart is from May.  We are still managing coumadin?

## 2024-07-23 NOTE — TELEPHONE ENCOUNTER
Please have pt switch out to 2.5 mg and redarw in two days.  His number is slightly over 3.  If this makes him to low we will switch him back to three and leave him there

## 2024-07-23 NOTE — TELEPHONE ENCOUNTER
Spoke with patient  Reviewed PA assessment:  His hgb is up to 14 and he just completed the iron infusions the end of June. I would prefer to hold on additional iron for now  Patient instructed to go to ED with chest pain and or SOB    Patient verbalizes understanding

## 2024-07-23 NOTE — TELEPHONE ENCOUNTER
His hgb is up to 14 and he just completed the iron infusions the end of June. I would prefer to hold on additional iron for now

## 2024-07-23 NOTE — TELEPHONE ENCOUNTER
Pt called back to review the VM that was left. Pt reported that he feels symptomatic , symptoms that he would experience when his iron is low. Reporting sob, fatigue, chest pain however pt feels stable and feels these symptoms when his iron is low. He wants to know what recommendations are. Also wanted to know if he should continue iron tablets ( pt taking tablets every other day). Pt advised to seek immediate medical attention if symptoms progress or worsens, or unresolved.  Please advise.

## 2024-07-24 ENCOUNTER — TELEPHONE (OUTPATIENT)
Dept: FAMILY MEDICINE CLINIC | Facility: CLINIC | Age: 79
End: 2024-07-24

## 2024-07-24 NOTE — TELEPHONE ENCOUNTER
----- Message from Cathy GREY sent at 7/23/2024 11:48 AM EDT -----  You are still managing inr- he Is taking 3mg daily currently    Catyh Myers  CMA

## 2024-07-24 NOTE — TELEPHONE ENCOUNTER
We did.  I did not forward this to you again.    I spoke to him and updated his calendar.  Just AVE Myers  CMA

## 2024-07-25 ENCOUNTER — OFFICE VISIT (OUTPATIENT)
Dept: PHYSICAL THERAPY | Facility: CLINIC | Age: 79
End: 2024-07-25
Payer: COMMERCIAL

## 2024-07-25 ENCOUNTER — OFFICE VISIT (OUTPATIENT)
Age: 79
End: 2024-07-25
Payer: COMMERCIAL

## 2024-07-25 VITALS — BODY MASS INDEX: 30.93 KG/M2 | WEIGHT: 241 LBS | HEIGHT: 74 IN | RESPIRATION RATE: 17 BRPM

## 2024-07-25 DIAGNOSIS — M79.671 HEEL PAIN, BILATERAL: ICD-10-CM

## 2024-07-25 DIAGNOSIS — L97.421 DIABETIC ULCER OF LEFT HEEL ASSOCIATED WITH TYPE 2 DIABETES MELLITUS, LIMITED TO BREAKDOWN OF SKIN (HCC): ICD-10-CM

## 2024-07-25 DIAGNOSIS — M79.672 FOOT PAIN, BILATERAL: ICD-10-CM

## 2024-07-25 DIAGNOSIS — L97.521 DIABETIC ULCER OF TOE OF LEFT FOOT ASSOCIATED WITH TYPE 2 DIABETES MELLITUS, LIMITED TO BREAKDOWN OF SKIN (HCC): ICD-10-CM

## 2024-07-25 DIAGNOSIS — E11.42 DIABETIC POLYNEUROPATHY ASSOCIATED WITH TYPE 2 DIABETES MELLITUS (HCC): ICD-10-CM

## 2024-07-25 DIAGNOSIS — E11.621 DIABETIC ULCER OF LEFT HEEL ASSOCIATED WITH TYPE 2 DIABETES MELLITUS, LIMITED TO BREAKDOWN OF SKIN (HCC): ICD-10-CM

## 2024-07-25 DIAGNOSIS — M79.672 HEEL PAIN, BILATERAL: ICD-10-CM

## 2024-07-25 DIAGNOSIS — M79.671 FOOT PAIN, BILATERAL: ICD-10-CM

## 2024-07-25 DIAGNOSIS — E11.621 DIABETIC ULCER OF TOE OF LEFT FOOT ASSOCIATED WITH TYPE 2 DIABETES MELLITUS, LIMITED TO BREAKDOWN OF SKIN (HCC): ICD-10-CM

## 2024-07-25 DIAGNOSIS — M77.42 METATARSALGIA OF BOTH FEET: Primary | ICD-10-CM

## 2024-07-25 DIAGNOSIS — M77.41 METATARSALGIA OF BOTH FEET: Primary | ICD-10-CM

## 2024-07-25 DIAGNOSIS — M72.2 PLANTAR FASCIITIS, RIGHT: Primary | ICD-10-CM

## 2024-07-25 DIAGNOSIS — I70.209 PERIPHERAL ARTERIOSCLEROSIS (HCC): ICD-10-CM

## 2024-07-25 DIAGNOSIS — M54.16 RADICULOPATHY OF LUMBAR REGION: ICD-10-CM

## 2024-07-25 PROCEDURE — 97112 NEUROMUSCULAR REEDUCATION: CPT

## 2024-07-25 PROCEDURE — 99214 OFFICE O/P EST MOD 30 MIN: CPT | Performed by: PODIATRIST

## 2024-07-25 PROCEDURE — 97110 THERAPEUTIC EXERCISES: CPT

## 2024-07-25 RX ORDER — DULOXETIN HYDROCHLORIDE 30 MG/1
30 CAPSULE, DELAYED RELEASE ORAL DAILY
Qty: 30 CAPSULE | Refills: 2 | Status: SHIPPED | OUTPATIENT
Start: 2024-07-25 | End: 2024-10-23

## 2024-07-25 NOTE — PROGRESS NOTES
Assessment/Plan: Ischemic pain secondary to vascular insufficiency bilateral lower extremity.  Diabetic neuropathy.  Metatarsalgia.  Radiculopathy.  Chronic pain.  Diabetic foot ulcer left heel and second left toe.,  Superficial Sumner grade 1.  Negative cellulitis.    Plan.  Chart reviewed.  PCP notes reviewed patient examined.  At this time patient has developed 2 new wounds of the left foot.  Today they have been dressed with gentian violet dry sterile dressing.  He will bandage daily.  Referral being made to vascular surgery for workup of PAD.  In addition patient's be referred to pain management for chronic pain.  Patient will remain on Trental.  He will remain on gabapentin.  Will add Cymbalta.  He will initiate wound care.  Watch for signs of infection.  Return for follow-up.       Diagnoses and all orders for this visit:    Metatarsalgia of both feet  -     Ambulatory referral to Spine & Pain Management; Future  -     DULoxetine (Cymbalta) 30 mg delayed release capsule; Take 1 capsule (30 mg total) by mouth daily    Radiculopathy of lumbar region  -     Ambulatory referral to Spine & Pain Management; Future  -     DULoxetine (Cymbalta) 30 mg delayed release capsule; Take 1 capsule (30 mg total) by mouth daily    Diabetic polyneuropathy associated with type 2 diabetes mellitus (HCC)  -     Ambulatory referral to Spine & Pain Management; Future  -     DULoxetine (Cymbalta) 30 mg delayed release capsule; Take 1 capsule (30 mg total) by mouth daily    Peripheral arteriosclerosis (HCC)  -     Ambulatory referral to Vascular Surgery; Future  -     DULoxetine (Cymbalta) 30 mg delayed release capsule; Take 1 capsule (30 mg total) by mouth daily    Diabetic ulcer of left heel associated with type 2 diabetes mellitus, limited to breakdown of skin (HCC)  -     Ambulatory referral to Vascular Surgery; Future  -     DULoxetine (Cymbalta) 30 mg delayed release capsule; Take 1 capsule (30 mg total) by mouth  daily    Diabetic ulcer of toe of left foot associated with type 2 diabetes mellitus, limited to breakdown of skin (HCC)  -     Ambulatory referral to Vascular Surgery; Future  -     DULoxetine (Cymbalta) 30 mg delayed release capsule; Take 1 capsule (30 mg total) by mouth daily          Subjective: Patient is severe pain of the heel bilateral.  Patient has PAD.  He is diabetic with low back pain.    Allergies   Allergen Reactions    Lisinopril Rash and Lip Swelling         Current Outpatient Medications:     DULoxetine (Cymbalta) 30 mg delayed release capsule, Take 1 capsule (30 mg total) by mouth daily, Disp: 30 capsule, Rfl: 2    acetaminophen (TYLENOL) 325 mg tablet, Take 2 tablets (650 mg total) by mouth every 6 (six) hours as needed for mild pain, Disp: , Rfl: 0    amLODIPine (NORVASC) 10 mg tablet, Take 0.5 tablets (5 mg total) by mouth daily, Disp: 45 tablet, Rfl: 1    aspirin 81 mg chewable tablet, Chew 81 mg daily, Disp: , Rfl:     atorvastatin (LIPITOR) 40 mg tablet, Take 1 tablet (40 mg total) by mouth daily, Disp: 90 tablet, Rfl: 1    Blood Glucose Monitoring Suppl (OneTouch Verio Reflect) w/Device KIT, Check blood sugars twice daily. Please substitute with appropriate alternative as covered by patient's insurance. Dx: E11.65, Disp: 1 kit, Rfl: 0    cloNIDine (CATAPRES) 0.1 mg tablet, take 1 tablet by mouth every 12 hours, Disp: 180 tablet, Rfl: 1    Droplet Pen Needles 32G X 4 MM MISC, USE EVERY EVENING, Disp: 100 each, Rfl: 5    Elastic Bandages & Supports (Medical Compression Stockings) MISC, Use daily Knee High 15-20mmHg (Patient not taking: Reported on 2/1/2024), Disp: 2 each, Rfl: 4    Empagliflozin (Jardiance) 25 MG TABS, TAKE 1 TABLET BY MOUTH DAILY, Disp: 90 tablet, Rfl: 1    fenofibrate 160 MG tablet, TAKE 1 TABLET BY MOUTH DAILY, Disp: 100 tablet, Rfl: 1    ferrous sulfate 324 (65 Fe) mg, Take 1 tablet (324 mg total) by mouth 2 (two) times a day before meals, Disp: 180 tablet, Rfl: 1     gabapentin (NEURONTIN) 600 MG tablet, Take 1 tablet (600 mg total) by mouth 3 (three) times a day, Disp: 90 tablet, Rfl: 0    glimepiride (AMARYL) 2 mg tablet, Take 1 tablet (2 mg total) by mouth 2 (two) times a day, Disp: 180 tablet, Rfl: 3    glucose blood (OneTouch Verio) test strip, TEST TWICE A DAY, Disp: 200 strip, Rfl: 5    insulin glargine (LANTUS) 100 units/mL subcutaneous injection, Inject 15 Units under the skin daily at bedtime, Disp: 10 mL, Rfl: 0    isosorbide mononitrate (IMDUR) 30 mg 24 hr tablet, Take 3 tablets (90 mg total) by mouth daily, Disp: 270 tablet, Rfl: 1    ketoconazole (NIZORAL) 2 % cream, Apply topically daily, Disp: 60 g, Rfl: 1    metFORMIN (GLUCOPHAGE) 500 mg tablet, Take 1 tablet (500 mg total) by mouth 2 (two) times a day with meals, Disp: 180 tablet, Rfl: 1    metoprolol tartrate (LOPRESSOR) 50 mg tablet, Take 0.5 tablets (25 mg total) by mouth every 12 (twelve) hours, Disp: 90 tablet, Rfl: 1    Multiple Vitamins-Minerals (MULTIVITAMIN MEN 50+ PO), Take by mouth daily, Disp: , Rfl:     nitroglycerin (NITROSTAT) 0.4 mg SL tablet, Place 1 tablet (0.4 mg total) under the tongue every 5 (five) minutes as needed for chest pain, Disp: 30 tablet, Rfl: 0    Omega-3 Fatty Acids (fish oil) 1,000 mg, Take 4,000 mg by mouth 2 (two) times a day, Disp: , Rfl:     ondansetron (ZOFRAN) 4 mg tablet, Take 4 mg by mouth every 8 (eight) hours as needed for nausea or vomiting, Disp: , Rfl:     OneTouch Delica Lancets 33G MISC, Check blood sugars twice daily. Please substitute with appropriate alternative as covered by patient's insurance. Dx: E11.65, Disp: 200 each, Rfl: 3    pantoprazole (PROTONIX) 40 mg tablet, Take 1 tablet (40 mg total) by mouth daily, Disp: 90 tablet, Rfl: 1    pentoxifylline (TRENtal) 400 mg ER tablet, Take 1 tablet (400 mg total) by mouth 3 (three) times a day with meals, Disp: 90 tablet, Rfl: 2    ranolazine (RANEXA) 1000 MG SR tablet, Take 1 tablet (1,000 mg total) by mouth 2  (two) times a day, Disp: 180 tablet, Rfl: 3    sitaGLIPtin (Januvia) 100 mg tablet, TAKE 1 TABLET BY MOUTH DAILY, Disp: 100 tablet, Rfl: 1    torsemide (DEMADEX) 20 mg tablet, TAKE 1 TABLET BY MOUTH DAILY, Disp: 90 tablet, Rfl: 1    warfarin (COUMADIN) 1 mg tablet, take 1 tablet by mouth once daily, Disp: 90 tablet, Rfl: 0    warfarin (Coumadin) 2.5 mg tablet, Qd and as directed, Disp: 90 tablet, Rfl: 1    warfarin (COUMADIN) 3 mg tablet, take 1 tablet by mouth once daily, Disp: 90 tablet, Rfl: 0    Patient Active Problem List   Diagnosis    Mixed hyperlipidemia    Primary hypertension    Coronary artery disease involving native coronary artery of native heart without angina pectoris    S/P angioplasty with stent    Hx of CABG    S/P AAA repair    S/P prostatectomy    H/O prostate cancer    Type 2 diabetes mellitus with diabetic polyneuropathy, with long-term current use of insulin (HCC)    Varicose veins of left lower extremity    History of endovascular stent graft for abdominal aortic aneurysm (AAA)    Bruit (arterial)    Peripheral artery disease (HCC)    Type 2 diabetes mellitus with diabetic peripheral angiopathy without gangrene, with long-term current use of insulin (HCC)    Actinic keratoses    Acute kidney injury superimposed on chronic kidney disease  (HCC)    Platelets decreased (HCC)    Gross hematuria    Chronic HFpEF/Moderate pHTN (HFpEF) (HCC)    Mild aortic stenosis    Chronic kidney disease-mineral and bone disorder    Stable angina pectoris    Hypertensive urgency    Elevated troponin level not due myocardial infarction    Diabetic ulcer of right midfoot associated with diabetes mellitus due to underlying condition, limited to breakdown of skin (HCC)    Acute on chronic diastolic heart failure (HCC)    Stage 3b chronic kidney disease (HCC)    Frequent PVCs    Acute respiratory distress    Melena    Symptomatic anemia    Multiple gastric ulcers    Iron deficiency anemia due to chronic blood loss     Duodenal ulcer    Acute blood loss anemia    History of colon polyps    Shortness of breath    Iron deficiency anemia    Acute deep vein thrombosis (DVT) of left peroneal vein (HCC)    Plantar fasciitis, right          Patient ID: Thomas Horne is a 79 y.o. male.    HPI    The following portions of the patient's history were reviewed and updated as appropriate:     family history includes Alcohol abuse in his father; Cancer in his brother, brother, sister, and sister; Diabetes in his mother and sister; Heart disease in his brother.      reports that he quit smoking about 36 years ago. His smoking use included cigarettes. He started smoking about 68 years ago. He has a 49.5 pack-year smoking history. He has been exposed to tobacco smoke. He has never used smokeless tobacco. He reports current alcohol use of about 14.0 standard drinks of alcohol per week. He reports that he does not use drugs.    Vitals:    07/25/24 1539   Resp: 17       Review of Systems      Objective:  Patient's shoes and socks removed.   Foot ExamPhysical Exam         Right Foot/Ankle      Inspection and Palpation  Skin Exam: abnormal color and erythema;         Left Foot/Ankle       Inspection and Palpation  Skin Exam: abnormal color and erythema;         Physical Exam  Cardiovascular:      Pulses: Pulses are weak.   Feet:      Right foot:      Skin integrity: Erythema present.      Left foot:      Skin integrity: Erythema present.               General  General Appearance: appears stated age and healthy   Orientation: alert and oriented to person, place, and time   Affect: appropriate   Gait: antalgic         Right Foot/Ankle      Inspection and Palpation  Tenderness: metatarsals   Swelling: none   Arch: pes planus  Claw Toes: fifth toe  Hallux limitus: yes  Skin Exam: callus, dry skin and tinea;      Neurovascular  Dorsalis pedis: 1+  Posterior tibial: 1+  Saphenous nerve sensation: absent  Tibial nerve sensation: absent  Superficial peroneal  nerve sensation: absent  Deep peroneal nerve sensation: absent  Sural nerve sensation: absent        Left Foot/Ankle       Inspection and Palpation  Tenderness: metatarsals   Swelling: none   Arch: pes planus  Claw toes: second toe and fifth toe  Hallux limitus: yes  Skin Exam: callus, dry skin and tinea;      Neurovascular  Dorsalis pedis: 1+  Posterior tibial: 1+  Saphenous nerve sensation: absent  Tibial nerve sensation: absent  Superficial peroneal nerve sensation: absent  Deep peroneal nerve sensation: absent  Sural nerve sensation: absent           Physical Exam  Vitals signs and nursing note reviewed.   Cardiovascular:      Rate and Rhythm: Normal rate and regular rhythm.      Pulses:           Dorsalis pedis pulses are 1+ on the right side and 1+ on the left side.        Posterior tibial pulses are 1+ on the right side and 1+ on the left side.   Feet:      Right foot:      Skin integrity: Callus and dry skin present.      Left foot:      Skin integrity: Callus and dry skin present.   Skin:     Capillary Refill: Capillary refill takes 2 to 3 seconds.      Comments:   All nails are thick and mycotic.  Patient demonstrates bilateral dermatophytosis.  There is a pre ulcerative/ corn on the plantar aspect 2nd left toe.  This is developed into a Sumner grade 1 ulcer.  In addition left heel demonstrates superficial excoriation/diabetic foot ulcer.  Sumner grade 1.  Wound is approximately 1.0 cm² in size.  Negative cellulitis.  Right Foot/Ankle   Right Foot Inspection  Skin Exam: dry skin, callus and callus                                 Vascular     The right DP pulse is 1+. The right PT pulse is 1+.   Right Toe  - Comprehensive Exam  Arch: pes planus  Claw Toes: fifth toe  Hallux limitus: yes  Swelling: none   Tenderness: metatarsals            Left Foot/Ankle  Left Foot Inspection  Skin Exam: dry skin and callus                                             Vascular     The left DP pulse is 1+. The left PT pulse  is 1+.   Left Toe  - Comprehensive Exam  Arch: pes planus  Claw toes: second toe and fifth toe  Hallux limitus: yes  Swelling: none   Tenderness: metatarsals.     Patient's shoes and socks removed.     Right Foot/Ankle   Right Foot Inspection  Skin Exam: erythema and abnormal color.      Toe Exam: tenderness and right toe deformity.      Sensory   Vibration: diminished  Proprioception: diminished  Monofilament testing: diminished     Vascular  Capillary refills: < 3 seconds        Left Foot/Ankle  Left Foot Inspection  Skin Exam: erythema and abnormal color.      Toe Exam: tenderness and left toe deformity.      Sensory   Vibration: diminished  Proprioception: diminished  Monofilament testing: diminished     Vascular  Capillary refills: < 3 seconds        Assign Risk Category  Deformity present  Loss of protective sensation  Weak pulses  Risk: 2

## 2024-07-25 NOTE — PROGRESS NOTES
Daily Note     Today's date: 2024  Patient name: Thomas Horne  : 1945  MRN: [id number]  Referring provider: Lachman, James R, MD  Dx:   Encounter Diagnosis     ICD-10-CM    1. Plantar fasciitis, right  M72.2       2. Heel pain, bilateral  M79.671     M79.672       3. Foot pain, bilateral  M79.671     M79.672                      Subjective: Pt states that he noticed something on the bottom of his heel. He does not remember stepping on anything and only ever walks barefoot a small distance in his home. The bottom of his feet are very painful. He states that he may need an injection but hears that they are very painful.    Objective: See treatment diary below    Observation: Bluish/ red coloration of B/L feet and toes; greater at toes. Wound noted last session on L second toe at plantar surface of distal phalanx covered by band-aid on arrival to session. Dryness noted between 1, 2, and 3rd phalange on L foot. Additional wound on L heel noted today; ~ size of pencil eraser with ecchymosis surrounding wound; pt able to view this. Dry serosanguinous fluid noted on pt's L sock.     Assessment: Instructed pt to contact MD to address wounds noted on L plantar foot today for proper management/ to prevent infection; pt with preference to contact new podiatrist; instructed pt to contact PCP if there is waiting period to be seen by new MD. Introduced pt to non-weightbearing resistance ankles exercises; no complaint of pain while completing. Plan to add to HEP next session. Tolerated treatment well. Patient would benefit from continued PT.       Plan: Continue per plan of care.  Progress treatment as tolerated.       Precautions:   Past Medical History:   Diagnosis Date    Anemia     CAD (coronary artery disease)     Callus     Cancer (HCC)     prostate    CHF (congestive heart failure) (HCC)     Chronic kidney disease     Clotting disorder (HCC)     Coronary artery disease     Deep vein thrombosis (HCC)      Diabetes mellitus (HCC)     Difficulty walking     Duodenal ulcer     Hyperlipidemia     Hypertension     Myocardial infarction (HCC)     Neuropathy     Bilateral feet    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)     Plantar fasciitis     Sleep apnea     Could not tolerate CPAP     Past Surgical History:   Procedure Laterality Date    ADENOIDECTOMY      CARDIAC CATHETERIZATION Left 10/19/2022    Procedure: Cardiac Left Heart Cath;  Surgeon: Danielle Pereira MD;  Location: [facility];  Service: Cardiology    CARDIAC SURGERY  2002    3 cardiac bypass then angioplasty 7/2020    CHOLECYSTECTOMY      COLONOSCOPY  02/14/2024    CORONARY ARTERY BYPASS GRAFT      IR LOWER EXTREMITY ANGIOGRAM  11/01/2023    ME BYPASS W/VEIN FEMORAL-POPLITEAL Right 11/16/2023    Procedure: BYPASS FEMORAL-POPLITEAL WITH CRYO VEIN, RIGHT FEMORAL ENDARTERECTOMY;  Surgeon: Vasquez Clark MD;  Location: [facility];  Service: Vascular    ME SLCTV CATHJ 3RD+ ORD SLCTV ABDL PEL/LXTR BRNCH Right 11/01/2023    Procedure: ARTERIOGRAM Right lower extremity arteriogram with CO2 via right groin access;  Surgeon: Vasquez Clark MD;  Location: [facility];  Service: Vascular    PROSTATE SURGERY      TONSILLECTOMY           SOC: 7/16/2024  FOTO: 7/16/2024  POC Expiration: 10/8/2024  Daily Treatment Log  Date: Initial Evaluation  7/16/2024 7/18/2024 7/23/2024 7/25/2024  Next session   Visit#/ auth: 1  2  3  4     Objective Measures                           Manuals              PF rolling on therabar   1 x 10 R/L; instructed not to perform over heel d/t pain 1 x 20 midfoot B/L         STM to PF                                        Neuro Re-Ed    10'  10'  5'     Toe scrunches/ extension    20x ea. B/L  20x ea. B/L, 2x; alt w/ HR and TR  20x ea supported long sit      [place] / drop    Unable to lift marble B/L  Held held     backward walk at rail (as tolerable)     Forward strides: at window sill 6' x 4 laps w/ uni UE support held  "     tandem stance       Semi tandem 30\" x 1 R/L w/ CS; full tandem 30\" x 1 R/L w/ CS  held      Ther Ex    30'  30'  33'     LAQ w/ DF    2 x 5 R/L  2 x 5 R/L    Big toe stretch w/ sos    10\" x 4 R/L 10\" x 4 R/L       Seated gastroc stretch Long sit 30\" x 2 R/L 30\" x 2 R/L 10\" x 4 R/L w/ sos 20\" x 3 w/ towel under sos    Seated HR/TR  1 x 15 ea.  1 x 15 ea.  2 x 15 ea.; alt w/ toe scrunches  2 x 15 w/ sneakers     Seated [place] board ankle AROM   1 x 10 fwb/bwk/lateral R/L    1 x 10 fwb/bwk/lateral R/L w/ sneakers     4 way ankle AROM  1 x 10 ea. Seated B/L 1 x 20 DF/PF and inv/ev w/ heel prop R/L supine w/ HOB elevated for comfort 1 x 20 PF/DF Inv/EV sitting    1 x 10 YTB 4-way supported long sit    Seated heels slides     5\" x 10 R/L 1 x 15 R/L Held d/t wounds noted on observation     EDUCATION: Pathology, review of impairements, prognosis, activity modification, POC, and HEP  KE HEP updated and reviewed         Ther Activity             RDL for active HS stretch              STS             Gait Training                                         Modalities                                         HEP:  Access Code: [email or web address]  URL: [email or web address]  Date: 07/18/2024  Prepared by: Michell Szymanski    Exercises  - Seated Heel Raise  - 2 x daily - 1 sets - 15 reps  - Seated Toe Raise  - 2 x daily - 1 sets - 15 reps  - Seated Gastroc Stretch with Strap  - 2 x daily - 3 sets - 30 seconds hold  - Seated Toe Curl  - 2 x daily - 1 sets - 20 reps  - Supine Ankle Dorsiflexion and Plantarflexion AROM  - 2 x daily - 1 sets - 10 reps  - Supine Ankle Inversion and Eversion AROM  - 2 x daily - 1 sets - 10 reps         "

## 2024-07-26 ENCOUNTER — NURSE TRIAGE (OUTPATIENT)
Age: 79
End: 2024-07-26

## 2024-07-26 NOTE — TELEPHONE ENCOUNTER
Called and s/w pt, he stated that he has chronic minimal sensation in his feet, but is able to move toes. OV 7/29/24 w/ RD in [place]. Advised pt to go to ER is he has worsening symptoms, develops skin discoloration or skin changes or develops a wound. Pt verbalized understanding.

## 2024-07-26 NOTE — TELEPHONE ENCOUNTER
"Reason for Disposition  • MODERATE pain (e.g., interferes with normal activities, limping) and present > 3 days    Answer Assessment - Initial Assessment Questions  1. ONSET: \"When did the pain start?\"       Months ago   2. LOCATION: \"Where is the pain located?\"       Both feet, mostly in heels, up to legs   3. PAIN: \"How bad is the pain?\"    (Scale 1-10; or mild, moderate, severe)   - MILD (1-3): doesn't interfere with normal activities.    - MODERATE (4-7): interferes with normal activities (e.g., work or school) or awakens from sleep, limping.    - SEVERE (8-10): excruciating pain, unable to do any normal activities, unable to walk.       Sitting not bad 2-3/10. Walking 8-9/10   4. WORK OR EXERCISE: \"Has there been any recent work or exercise that involved this part of the body?\"       Difficult to stand/walk/shower   5. CAUSE: \"What do you think is causing the foot pain?\"      Unsure   6. OTHER SYMPTOMS: \"Do you have any other symptoms?\" (e.g., leg pain, rash, fever, numbness)      Left leg sometimes get purple discoloration, warm to touch sometimes. Neuropathy feet- diabetic. 2 wounds on left foot- ointment and bandage    Protocols used: Foot Pain-ADULT-[place]    "

## 2024-07-26 NOTE — TELEPHONE ENCOUNTER
Received call from pt.  He was advised and referred by his podiatrist to follow up with vascular.  Pt sees Dr. Clark, last visit 2/15/24, and pt is scheduled to see him 8/22 to follow up from [facility] 6/21/24.    Pt has been having worsening pain to both feet that shoots up his legs at times.  Pt states it is hard to walk and even stand, stating showering today was painful.  He has 2 new wounds that podiatry has treated with ointment and bandages at visit yesterday.  Pt denies swelling to feet or legs.  He states at times his left leg will get purple in color when he takes his shoes and socks off.  He states at times his feet and legs will be warm to touch.     I tried to move appointment up, but did not see sooner opening with Dr. Clark.  Added appointment to wait list.  Advised pt I would send a message to triage to see if there are any recommendations for his symptoms prior to visit.    Please advise.

## 2024-07-26 NOTE — TELEPHONE ENCOUNTER
Reviewed. Does patient have sensation in his feet and is he able to wiggle his toes without issue? He needs to be seen ASAP as it appears that he has significant stenosis to the inflow anastamosis of his bypass and is at risk for bypass occlusion. He also has severely diminished flow to his left leg and may not heel these new wounds

## 2024-07-26 NOTE — TELEPHONE ENCOUNTER
----- Message from Mehreen PERALTA sent at 7/26/2024 12:05 PM EDT -----  Pts podiatrist sent referral to see [facility], currently being treated by [facility]. Pt states he is in a lot of pain and can barely walk.

## 2024-07-29 ENCOUNTER — OFFICE VISIT (OUTPATIENT)
Dept: VASCULAR SURGERY | Facility: CLINIC | Age: 79
End: 2024-07-29
Payer: COMMERCIAL

## 2024-07-29 ENCOUNTER — HOSPITAL ENCOUNTER (OUTPATIENT)
Dept: NON INVASIVE DIAGNOSTICS | Facility: CLINIC | Age: 79
Discharge: HOME | End: 2024-07-29
Payer: COMMERCIAL

## 2024-07-29 VITALS
HEART RATE: 58 BPM | BODY MASS INDEX: 30.29 KG/M2 | WEIGHT: 236 LBS | SYSTOLIC BLOOD PRESSURE: 126 MMHG | DIASTOLIC BLOOD PRESSURE: 68 MMHG | HEIGHT: 74 IN

## 2024-07-29 DIAGNOSIS — I70.209 PERIPHERAL ARTERIOSCLEROSIS (HCC): ICD-10-CM

## 2024-07-29 DIAGNOSIS — L97.521 DIABETIC ULCER OF TOE OF LEFT FOOT ASSOCIATED WITH TYPE 2 DIABETES MELLITUS, LIMITED TO BREAKDOWN OF SKIN (HCC): ICD-10-CM

## 2024-07-29 DIAGNOSIS — E11.621 DIABETIC ULCER OF TOE OF LEFT FOOT ASSOCIATED WITH TYPE 2 DIABETES MELLITUS, LIMITED TO BREAKDOWN OF SKIN (HCC): ICD-10-CM

## 2024-07-29 DIAGNOSIS — L97.421 DIABETIC ULCER OF LEFT HEEL ASSOCIATED WITH TYPE 2 DIABETES MELLITUS, LIMITED TO BREAKDOWN OF SKIN (HCC): ICD-10-CM

## 2024-07-29 DIAGNOSIS — E11.621 DIABETIC ULCER OF LEFT HEEL ASSOCIATED WITH TYPE 2 DIABETES MELLITUS, LIMITED TO BREAKDOWN OF SKIN (HCC): ICD-10-CM

## 2024-07-29 PROCEDURE — 99214 OFFICE O/P EST MOD 30 MIN: CPT | Performed by: PHYSICIAN ASSISTANT

## 2024-07-29 PROCEDURE — 93926 LOWER EXTREMITY STUDY: CPT | Performed by: SURGERY

## 2024-07-29 PROCEDURE — 3074F SYST BP LT 130 MM HG: CPT | Performed by: PHYSICIAN ASSISTANT

## 2024-07-29 PROCEDURE — 93922 UPR/L XTREMITY ART 2 LEVELS: CPT | Performed by: SURGERY

## 2024-07-29 PROCEDURE — 3078F DIAST BP <80 MM HG: CPT | Performed by: PHYSICIAN ASSISTANT

## 2024-07-29 PROCEDURE — 93926 LOWER EXTREMITY STUDY: CPT

## 2024-07-29 PROCEDURE — 1159F MED LIST DOCD IN RCRD: CPT | Performed by: PHYSICIAN ASSISTANT

## 2024-07-29 PROCEDURE — 1160F RVW MEDS BY RX/DR IN RCRD: CPT | Performed by: PHYSICIAN ASSISTANT

## 2024-07-29 NOTE — PROGRESS NOTES
Ambulatory Visit  Name: Thomas Horne      : 1945      MRN: [id number]  Encounter Provider: Nori Pena PA-C  Encounter Date: 2024   Encounter department: [facility]    Chronic limb threatening ischemia with left heel and left second toe wound    Peripheral arterial diseae    S/P R fem-pop bypass '23 with known >75% prox anastomosis stenosis    -Added on for urgent OV; known to Dr. Clark  -New bilateral foot and heel pain x 1 month with pain on standing and walking  -New L heel ulceration and L 2nd toe ulcer    -NILS 24    R 0.73/NC/31; >75% prox anastomosis CFA- BK popliteal artery bypass graft ()    Monophasic waveforms in remainder of bypass graft. Tib/Peroneal occlusive disease    L 0.38/38/28; Occlusion dSFA with recon distally    -NILS 24    R 0.57/--/--; >75% stenosis noted in proximal anastomosis of the CFA -BK popliteal bypass graft (). Monophasic signals in bypass graft and LE arterial system.    Plan:   -Severe atherosclerosis of the lower extremity with left foot wounds with new L SFA occlusion c/w NILS from last year. Also s/p R fem-BK popliteal bypass with CryoVein and known proximal anastomosis stenosis.   -R foot  monophasic AT signal; ? Venous PT and DP  -L foot wounds with imaging below  -Continue aspirin, warfarin and atorvastatin  -Local foot care per podiatry  -We discussed symptoms concerning for which he should go to ED (foot pain not relieved by rest, worsening wound/drainage, coldness of foot, etc)  -Will check urgent R LE duplex to confirm bypass patency given new symptoms and question of exam change  -Recommend LLE angiogram and intervention with CO2 but has CKD IIIb and would need nephrology so will review with surgeon. He is also on warfarin which would need to be held.  -Already has upcoming routine OV scheduled with Dr. Clark next month  -Will review case with Dr. Clark to see if he would like to proceed with angiogram  at this time or re-evaluate in 1 month.        Assessment & Plan   1. Peripheral arteriosclerosis (HCC)  -     Ambulatory referral to Vascular Surgery  2. Diabetic ulcer of left heel associated with type 2 diabetes mellitus, limited to breakdown of skin (HCC)  -     Ambulatory referral to Vascular Surgery  3. Diabetic ulcer of toe of left foot associated with type 2 diabetes mellitus, limited to breakdown of skin (HCC)  -     Ambulatory referral to Vascular Surgery    CC: 1 month of worsening pain in both feet that shoots up legs. Also has 2 new wounds on L foot. NILS done 6/21/24. He is s/p R fem-pop bypass w/ cryo vein 11/16/23 ([facility]).   History of Present Illness     Thomas Horne is a 79 y.o. male remote smoker, Hx DVT, Hx prostate CA, hypertension, hyperlipidemia, diabetes requiring insulin, neuropathy, CKD IIIb, CAD s/p CAB s/p cor stenting, right AAA s/p endovascular repair, chronic HFpEF, moderate pHTN, severe atherosclerosis of the lower extremities.    Mr. Horne underwent R femoral to below-knee popliteal artery bypass with CryoVein for R heel ulceration on 11/16/23 which eventually healed. He follows with Dr. Arango from podiatry.    7/29/24: Mr. Horne is added on for urgent office visit today due to bilateral foot pain and left foot wounds.    Mr. Aldrich has had bilateral heel and midfoot plantar pain for the last month or so.  He believes this started after his last Doppler in June.  Pain has been so significant he cannot stand or walk well.  In physical therapy, a week or 2 ago, the therapist noticed left heel ulceration.  He was seen by Dr. Arango who is treating locally and concerned for arterial insufficiency and they sent him back to vascular for evaluation.     Patient states chronic neuropathy in the feet.  New change with pain on standing and walking. Wounds apparently not worsening.   Some bloody drainage to the left heel.  No fevers or chills.      Not taking insulin since blood sugars  "have been low.      A1c 7  LDL 57  BUN/creat 31/1.71 (baseline 1.6-2.1)    Review of Systems   Constitutional: Negative.    HENT: Negative.     Eyes: Negative.    Respiratory: Negative.     Cardiovascular: Negative.    Gastrointestinal: Negative.    Endocrine: Negative.    Genitourinary: Negative.    Musculoskeletal:  Positive for gait problem.        Leg pain   Skin:  Positive for color change and wound.   Allergic/Immunologic: Negative.    Hematological:  Bruises/bleeds easily.   Psychiatric/Behavioral: Negative.         Objective     /68 (BP Location: Left arm, Patient Position: Sitting)   Pulse 58   Ht 6' 2\" (1.88 m)   Wt 107 kg (236 lb)   BMI 30.30 kg/m²     R AT and peroneal signals; ?PT and DP sound venous    L DP/PT monophasic signals; chronic rubor; large bulky varicose veins.    Chronic neuropathy                Physical Exam  Vitals and nursing note reviewed.   Constitutional:       Appearance: He is well-developed.   HENT:      Head: Normocephalic and atraumatic.   Eyes:      Pupils: Pupils are equal, round, and reactive to light.   Neck:      Vascular: No JVD.   Cardiovascular:      Rate and Rhythm: Normal rate and regular rhythm.      Pulses:           Carotid pulses are 2+ on the right side and 2+ on the left side.       Radial pulses are 2+ on the right side and 2+ on the left side.        Dorsalis pedis pulses are detected w/ Doppler on the left side.        Posterior tibial pulses are detected w/ Doppler on the left side.      Heart sounds: S1 normal and S2 normal. Murmur heard.      Systolic murmur is present with a grade of 2/6.      No friction rub. No gallop.   Pulmonary:      Effort: Pulmonary effort is normal. No accessory muscle usage or respiratory distress.      Breath sounds: Normal breath sounds. No wheezing or rales.   Abdominal:      General: Bowel sounds are normal. There is no distension.      Palpations: Abdomen is soft.      Tenderness: There is no abdominal tenderness. " "  Musculoskeletal:         General: No deformity. Normal range of motion.      Cervical back: Full passive range of motion without pain and neck supple.      Right lower leg: No edema.      Left lower leg: No edema.   Skin:     General: Skin is warm and dry.      Findings: No lesion or rash.      Nails: There is no clubbing.   Neurological:      Mental Status: He is alert and oriented to person, place, and time.      Comments: Grossly normal    Psychiatric:         Behavior: Behavior is cooperative.       I have reviewed and made appropriate changes to the review of systems input by the medical assistant.    Vitals:    07/29/24 1059   BP: 126/68   BP Location: Left arm   Patient Position: Sitting   Pulse: 58   Weight: 107 kg (236 lb)   Height: 6' 2\" (1.88 m)       Patient Active Problem List   Diagnosis    Mixed hyperlipidemia    Primary hypertension    Coronary artery disease involving native coronary artery of native heart without angina pectoris    S/P angioplasty with stent    Hx of CABG    S/P AAA repair    S/P prostatectomy    H/O prostate cancer    Type 2 diabetes mellitus with diabetic polyneuropathy, with long-term current use of insulin ([facility])    Varicose veins of left lower extremity    History of endovascular stent graft for abdominal aortic aneurysm (AAA)    Bruit (arterial)    Peripheral artery disease (HCC)    Type 2 diabetes mellitus with diabetic peripheral angiopathy without gangrene, with long-term current use of insulin (HCC)    Actinic keratoses    Acute kidney injury superimposed on chronic kidney disease  (HCC)    Platelets decreased (HCC)    Gross hematuria    Chronic HFpEF/Moderate pHTN (HFpEF) (HCC)    Mild aortic stenosis    Chronic kidney disease-mineral and bone disorder    Stable angina pectoris    Hypertensive urgency    Elevated troponin level not due myocardial infarction    Diabetic ulcer of right midfoot associated with diabetes mellitus due to underlying condition, limited to " breakdown of skin (HCC)    Acute on chronic diastolic heart failure (HCC)    Stage 3b chronic kidney disease (HCC)    Frequent PVCs    Acute respiratory distress    Melena    Symptomatic anemia    Multiple gastric ulcers    Iron deficiency anemia due to chronic blood loss    Duodenal ulcer    Acute blood loss anemia    History of colon polyps    Shortness of breath    Iron deficiency anemia    Acute deep vein thrombosis (DVT) of left peroneal vein (HCC)    Plantar fasciitis, right       Past Surgical History:   Procedure Laterality Date    ADENOIDECTOMY      CARDIAC CATHETERIZATION Left 10/19/2022    Procedure: Cardiac Left Heart Cath;  Surgeon: Danielle Pereira MD;  Location: [facility];  Service: Cardiology    CARDIAC SURGERY  2002    3 cardiac bypass then angioplasty 7/2020    CHOLECYSTECTOMY      COLONOSCOPY  02/14/2024    CORONARY ARTERY BYPASS GRAFT      IR LOWER EXTREMITY ANGIOGRAM  11/01/2023    ND BYPASS W/VEIN FEMORAL-POPLITEAL Right 11/16/2023    Procedure: BYPASS FEMORAL-POPLITEAL WITH CRYO VEIN, RIGHT FEMORAL ENDARTERECTOMY;  Surgeon: Vasquez Clark MD;  Location: [facility];  Service: Vascular    ND SLCTV CATHJ 3RD+ ORD SLCTV ABDL PEL/LXTR BRNCH Right 11/01/2023    Procedure: ARTERIOGRAM Right lower extremity arteriogram with CO2 via right groin access;  Surgeon: Vasquez Clark MD;  Location: [facility];  Service: Vascular    PROSTATE SURGERY      TONSILLECTOMY         Family History   Problem Relation Age of Onset    Diabetes Mother     Alcohol abuse Father     Heart disease Brother     Cancer Sister         Thyroid    Cancer Sister         Colon    Cancer Brother         Throat    Cancer Brother     Diabetes Sister     Mental illness Neg Hx        Social History     Socioeconomic History    Marital status:      Spouse name: Not on file    Number of children: 2    Years of education: Not on file    Highest education level: Some college, no degree    Occupational History    Not on file   Tobacco Use    Smoking status: Former     Current packs/day: 0.00     Average packs/day: 1.5 packs/day for 33.0 years (49.5 ttl pk-yrs)     Types: Cigarettes     Start date:      Quit date:      Years since quittin.6     Passive exposure: Past    Smokeless tobacco: Never   Vaping Use    Vaping status: Never Used   Substance and Sexual Activity    Alcohol use: Yes     Alcohol/week: 14.0 standard drinks of alcohol     Types: 14 Cans of beer per week     Comment: 1 -2 daily    Drug use: Never    Sexual activity: Not Currently     Partners: Female     Birth control/protection: Male Sterilization   Other Topics Concern    Not on file   Social History Narrative    Not on file     Social Determinants of Health     Financial Resource Strain: Low Risk  (10/31/2023)    Overall Financial Resource Strain (CARDIA)     Difficulty of Paying Living Expenses: Not hard at all   Food Insecurity: No Food Insecurity (2024)    Hunger Vital Sign     Worried About Running Out of Food in the Last Year: Never true     Ran Out of Food in the Last Year: Never true   Transportation Needs: No Transportation Needs (2024)    PRAPARE - Transportation     Lack of Transportation (Medical): No     Lack of Transportation (Non-Medical): No   Physical Activity: Not on file   Stress: Not on file   Social Connections: Not on file   Intimate Partner Violence: Not on file   Housing Stability: Low Risk  (2024)    Housing Stability Vital Sign     Unable to Pay for Housing in the Last Year: No     Number of Times Moved in the Last Year: 1     Homeless in the Last Year: No       Allergies   Allergen Reactions    Lisinopril Rash and Lip Swelling         Current Outpatient Medications:     acetaminophen (TYLENOL) 325 mg tablet, Take 2 tablets (650 mg total) by mouth every 6 (six) hours as needed for mild pain, Disp: , Rfl: 0    amLODIPine (NORVASC) 10 mg tablet, Take 0.5 tablets (5 mg total) by mouth  daily, Disp: 45 tablet, Rfl: 1    aspirin 81 mg chewable tablet, Chew 81 mg daily, Disp: , Rfl:     atorvastatin (LIPITOR) 40 mg tablet, Take 1 tablet (40 mg total) by mouth daily, Disp: 90 tablet, Rfl: 1    Blood Glucose Monitoring Suppl (OneTouch Verio Reflect) w/Device KIT, Check blood sugars twice daily. Please substitute with appropriate alternative as covered by patient's insurance. Dx: E11.65, Disp: 1 kit, Rfl: 0    cloNIDine (CATAPRES) 0.1 mg tablet, take 1 tablet by mouth every 12 hours, Disp: 180 tablet, Rfl: 1    Droplet Pen Needles 32G X 4 MM MISC, USE EVERY EVENING, Disp: 100 each, Rfl: 5    DULoxetine (Cymbalta) 30 mg delayed release capsule, Take 1 capsule (30 mg total) by mouth daily, Disp: 30 capsule, Rfl: 2    Empagliflozin (Jardiance) 25 MG TABS, TAKE 1 TABLET BY MOUTH DAILY, Disp: 90 tablet, Rfl: 1    fenofibrate 160 MG tablet, TAKE 1 TABLET BY MOUTH DAILY, Disp: 100 tablet, Rfl: 1    gabapentin (NEURONTIN) 600 MG tablet, Take 1 tablet (600 mg total) by mouth 3 (three) times a day, Disp: 90 tablet, Rfl: 0    glimepiride (AMARYL) 2 mg tablet, Take 1 tablet (2 mg total) by mouth 2 (two) times a day, Disp: 180 tablet, Rfl: 3    glucose blood (OneTouch Verio) test strip, TEST TWICE A DAY, Disp: 200 strip, Rfl: 5    insulin glargine (LANTUS) 100 units/mL subcutaneous injection, Inject 15 Units under the skin daily at bedtime, Disp: 10 mL, Rfl: 0    isosorbide mononitrate (IMDUR) 30 mg 24 hr tablet, Take 3 tablets (90 mg total) by mouth daily, Disp: 270 tablet, Rfl: 1    ketoconazole (NIZORAL) 2 % cream, Apply topically daily, Disp: 60 g, Rfl: 1    metFORMIN (GLUCOPHAGE) 500 mg tablet, Take 1 tablet (500 mg total) by mouth 2 (two) times a day with meals, Disp: 180 tablet, Rfl: 1    metoprolol tartrate (LOPRESSOR) 50 mg tablet, Take 0.5 tablets (25 mg total) by mouth every 12 (twelve) hours, Disp: 90 tablet, Rfl: 1    Multiple Vitamins-Minerals (MULTIVITAMIN MEN 50+ PO), Take by mouth daily, Disp: ,  Rfl:     nitroglycerin (NITROSTAT) 0.4 mg SL tablet, Place 1 tablet (0.4 mg total) under the tongue every 5 (five) minutes as needed for chest pain, Disp: 30 tablet, Rfl: 0    Omega-3 Fatty Acids (fish oil) 1,000 mg, Take 4,000 mg by mouth 2 (two) times a day, Disp: , Rfl:     ondansetron (ZOFRAN) 4 mg tablet, Take 4 mg by mouth every 8 (eight) hours as needed for nausea or vomiting, Disp: , Rfl:     OneTouch Delica Lancets 33G MISC, Check blood sugars twice daily. Please substitute with appropriate alternative as covered by patient's insurance. Dx: E11.65, Disp: 200 each, Rfl: 3    pantoprazole (PROTONIX) 40 mg tablet, Take 1 tablet (40 mg total) by mouth daily, Disp: 90 tablet, Rfl: 1    pentoxifylline (TRENtal) 400 mg ER tablet, Take 1 tablet (400 mg total) by mouth 3 (three) times a day with meals, Disp: 90 tablet, Rfl: 2    ranolazine (RANEXA) 1000 MG SR tablet, Take 1 tablet (1,000 mg total) by mouth 2 (two) times a day, Disp: 180 tablet, Rfl: 3    sitaGLIPtin (Januvia) 100 mg tablet, TAKE 1 TABLET BY MOUTH DAILY, Disp: 100 tablet, Rfl: 1    torsemide (DEMADEX) 20 mg tablet, TAKE 1 TABLET BY MOUTH DAILY, Disp: 90 tablet, Rfl: 1    warfarin (Coumadin) 2.5 mg tablet, Qd and as directed, Disp: 90 tablet, Rfl: 1    Elastic Bandages & Supports (Medical Compression Stockings) MISC, Use daily Knee High 15-20mmHg (Patient not taking: Reported on 2/1/2024), Disp: 2 each, Rfl: 4    ferrous sulfate 324 (65 Fe) mg, Take 1 tablet (324 mg total) by mouth 2 (two) times a day before meals, Disp: 180 tablet, Rfl: 1    warfarin (COUMADIN) 1 mg tablet, take 1 tablet by mouth once daily, Disp: 90 tablet, Rfl: 0    warfarin (COUMADIN) 3 mg tablet, take 1 tablet by mouth once daily, Disp: 90 tablet, Rfl: 0

## 2024-07-29 NOTE — PATIENT INSTRUCTIONS
Amber 2/15/24: 79-year-old man who presents in follow-up for monitoring of his right heel ulcer.  Fortunately he has healed his right heel ulcer and was released from wound care today.  He underwent a CryoVein femoral to below-knee popliteal artery bypass in the fall.  His recent duplex suggest that he has an inflow stenosis and the bypass is at risk.  Due to the healing of his wound and appropriate footcare going forward, the patient and I have decided to continue to monitor his peripheral arterial occlusive disease without reintervention at this time.  He will follow with his podiatrist Dr. Arango closely and will likely need to have special accommodations to avoid erosion at the region of his right heel in the future.  He speculates that there was talk of a bone spur in this area.  He is continuing his medical therapy.  Unfortunately of late he has developed fairly severe shortness of breath.  This is caused him to have difficulty getting up and down the stairs.  We have reached out to Dr. Lombardi's office to try to help him schedule an appointment for evaluation at a short interval.  I reviewed his medication list and I do not see anything that is common place for development of shortness of breath.  He was encouraged to seek emergency care with any worsening or persistence of his symptoms.       7/29/24:  Patient placed in schedule today for urgent office visit due to foot pain and L toe wounds. He complains of bilateral foot pain and nearly unable to walk.     He has developed diabetic L heel ulcer and left second toe wound.     Creat  1.71 (1.6-2.1)          Assessment/Plan: Ischemic pain secondary to vascular insufficiency bilateral lower extremity.  Diabetic neuropathy.  Metatarsalgia.  Radiculopathy.  Chronic pain.  Diabetic foot ulcer left heel and second left toe.,  Superficial Sumner grade 1.  Negative cellulitis.     Plan.  Chart reviewed.  PCP notes  reviewed patient examined.  At this time patient has developed 2 new wounds of the left foot.  Today they have been dressed with gentian violet dry sterile dressing.  He will bandage daily.  Referral being made to vascular surgery for workup of PAD.  In addition patient's be referred to pain management for chronic pain.  Patient will remain on Trental.  He will remain on gabapentin.  Will add Cymbalta.  He will initiate wound care.  Watch for signs of infection.  Return for follow-up.              Medical therapy includes aspirin 81, warfarin, atorvastatin 40, torsemide 20, Imdur, Ranexa, Jardiance, Lantus, etc      NILS 6/21/24  [facility] REPORT  CLINICAL:  Indications:  6 month surveillance for progression of disease. The patient denies any leg  pain with walking.  Operative History:  2023-11-16 Right Pr bypass w/vein femoral- below popliteal  2023-11-01 IR LE angiogram  2022-10-19 Cardiac catheterization  2002-01-01 CABG x3  Endovascular abdominal aortic aneurysm, modular bifurcated prosthesis, two  docking limb  Risk Factors  The patient has history of HTN, Diabetes, Hyperlipidemia, CKD, PAD, CAD, DVT,  AAA, and previous smoking (quit >10yrs ago).  Clinical  Right Pressure:  143/ mm Hg, Left Pressure:  131/ mm Hg.     FINDINGS:     Right                  Impression  PSV (cm/s)  EDV    Inflow Anastomosis     >75%               447   61    High Thigh (Graft)                        103   13    Mid Thigh (Graft)                         144   20    Low Thigh (Graft)                          91   14    Near Knee (Graft)                          52   11    Outflow Anastomosis                        94   25    Common Femoral Artery                     105         Dist Post Tibial       Retrograde           9    0    Dist. Ant. Tibial                          16    8    Dist Peroneal                              51   25       Left                   Impression      PSV (cm/s)  EDV    Common Femoral Artery                           86         Prox Profunda                                  93         Prox SFA                                       63         Mid SFA                Branch                  73         Dist SFA               Occluded                 0         Proximal Pop                                   36   20    Distal Pop                                     30   14    Tibioperoneal                                  38   18    Dist Post Tibial                               27   15    Dist. Ant. Tibial                              33   17    Dist Peroneal          Not visualized                              CONCLUSION:  Impression:    RIGHT LOWER LIMB:  Evaluation shows >75% stenosis noted in the proximal anastomosis of the common  femoral - below knee popliteal artery bypass graft.  Monophasic waveforms are noted throughout the remainder of the bypass graft and  lower extremity arterial system.  Evidence suggestive of Tib/Peroneal occlusive disease.  Ankle/Brachial index: 0.73 which is in the moderate disease category. (Prior:  0.57)  PVR/ PPG tracings are dampened.  Metatarsal pressure was non-compressible.  Great toe pressure of 31 mm Hg, below the healing range for a diabetic.     LEFT LOWER LIMB:  Evaluation shows a high grade stenosis vs occlusion of the distal superficial  femoral artery with reconstitution distally.  Ankle/Brachial index: 0.38 which is in the ischemic disease category. (Prior:  0.67)  PVR/ PPG tracings are dampened.  Metatarsal pressure of 38 mm Hg.  Great toe pressure of 28 mm Hg, below the healing range for a diabetic.     Tech Note:  There is an echogenic structure located in the Right inguinal region measuring  approximately 1.46cm suggestive of enlarged lymphatic channels.     Compared to previous study on 1/11/2024, there is a new finding of an occluded  left distal SFA. There is also a significant improvement in the right FABIOLA, and a  significant decrease in the left FABIOLA.

## 2024-07-29 NOTE — H&P (VIEW-ONLY)
Ambulatory Visit  Name: Thomas Horne      : 1945      MRN: [id number]  Encounter Provider: Nori Pena PA-C  Encounter Date: 2024   Encounter department: [facility]    Chronic limb threatening ischemia with left heel and left second toe wound    Peripheral arterial diseae    S/P R fem-pop bypass '23 with known >75% prox anastomosis stenosis    -Added on for urgent OV; known to Dr. Clark  -New bilateral foot and heel pain x 1 month with pain on standing and walking  -New L heel ulceration and L 2nd toe ulcer    -NILS 24    R 0.73/NC/31; >75% prox anastomosis CFA- BK popliteal artery bypass graft ()    Monophasic waveforms in remainder of bypass graft. Tib/Peroneal occlusive disease    L 0.38/38/28; Occlusion dSFA with recon distally    -NILS 24    R 0.57/--/--; >75% stenosis noted in proximal anastomosis of the CFA -BK popliteal bypass graft (). Monophasic signals in bypass graft and LE arterial system.    Plan:   -Severe atherosclerosis of the lower extremity with left foot wounds with new L SFA occlusion c/w NILS from last year. Also s/p R fem-BK popliteal bypass with CryoVein and known proximal anastomosis stenosis.   -R foot  monophasic AT signal; ? Venous PT and DP  -L foot wounds with imaging below  -Continue aspirin, warfarin and atorvastatin  -Local foot care per podiatry  -We discussed symptoms concerning for which he should go to ED (foot pain not relieved by rest, worsening wound/drainage, coldness of foot, etc)  -Will check urgent R LE duplex to confirm bypass patency given new symptoms and question of exam change  -Recommend LLE angiogram and intervention with CO2 but has CKD IIIb and would need nephrology so will review with surgeon. He is also on warfarin which would need to be held.  -Already has upcoming routine OV scheduled with Dr. Clark next month  -Will review case with Dr. Clark to see if he would like to proceed with angiogram  at this time or re-evaluate in 1 month.        Assessment & Plan   1. Peripheral arteriosclerosis (HCC)  -     Ambulatory referral to Vascular Surgery  2. Diabetic ulcer of left heel associated with type 2 diabetes mellitus, limited to breakdown of skin (HCC)  -     Ambulatory referral to Vascular Surgery  3. Diabetic ulcer of toe of left foot associated with type 2 diabetes mellitus, limited to breakdown of skin (HCC)  -     Ambulatory referral to Vascular Surgery    CC: 1 month of worsening pain in both feet that shoots up legs. Also has 2 new wounds on L foot. NILS done 6/21/24. He is s/p R fem-pop bypass w/ cryo vein 11/16/23 ([facility]).   History of Present Illness     Thomas Horne is a 79 y.o. male remote smoker, Hx DVT, Hx prostate CA, hypertension, hyperlipidemia, diabetes requiring insulin, neuropathy, CKD IIIb, CAD s/p CAB s/p cor stenting, right AAA s/p endovascular repair, chronic HFpEF, moderate pHTN, severe atherosclerosis of the lower extremities.    Mr. Horne underwent R femoral to below-knee popliteal artery bypass with CryoVein for R heel ulceration on 11/16/23 which eventually healed. He follows with Dr. Arango from podiatry.    7/29/24: Mr. Horne is added on for urgent office visit today due to bilateral foot pain and left foot wounds.    Mr. Aldrich has had bilateral heel and midfoot plantar pain for the last month or so.  He believes this started after his last Doppler in June.  Pain has been so significant he cannot stand or walk well.  In physical therapy, a week or 2 ago, the therapist noticed left heel ulceration.  He was seen by Dr. Arango who is treating locally and concerned for arterial insufficiency and they sent him back to vascular for evaluation.     Patient states chronic neuropathy in the feet.  New change with pain on standing and walking. Wounds apparently not worsening.   Some bloody drainage to the left heel.  No fevers or chills.      Not taking insulin since blood sugars  "have been low.      A1c 7  LDL 57  BUN/creat 31/1.71 (baseline 1.6-2.1)    Review of Systems   Constitutional: Negative.    HENT: Negative.     Eyes: Negative.    Respiratory: Negative.     Cardiovascular: Negative.    Gastrointestinal: Negative.    Endocrine: Negative.    Genitourinary: Negative.    Musculoskeletal:  Positive for gait problem.        Leg pain   Skin:  Positive for color change and wound.   Allergic/Immunologic: Negative.    Hematological:  Bruises/bleeds easily.   Psychiatric/Behavioral: Negative.         Objective     /68 (BP Location: Left arm, Patient Position: Sitting)   Pulse 58   Ht 6' 2\" (1.88 m)   Wt 107 kg (236 lb)   BMI 30.30 kg/m²     R AT and peroneal signals; ?PT and DP sound venous    L DP/PT monophasic signals; chronic rubor; large bulky varicose veins.    Chronic neuropathy                Physical Exam  Vitals and nursing note reviewed.   Constitutional:       Appearance: He is well-developed.   HENT:      Head: Normocephalic and atraumatic.   Eyes:      Pupils: Pupils are equal, round, and reactive to light.   Neck:      Vascular: No JVD.   Cardiovascular:      Rate and Rhythm: Normal rate and regular rhythm.      Pulses:           Carotid pulses are 2+ on the right side and 2+ on the left side.       Radial pulses are 2+ on the right side and 2+ on the left side.        Dorsalis pedis pulses are detected w/ Doppler on the left side.        Posterior tibial pulses are detected w/ Doppler on the left side.      Heart sounds: S1 normal and S2 normal. Murmur heard.      Systolic murmur is present with a grade of 2/6.      No friction rub. No gallop.   Pulmonary:      Effort: Pulmonary effort is normal. No accessory muscle usage or respiratory distress.      Breath sounds: Normal breath sounds. No wheezing or rales.   Abdominal:      General: Bowel sounds are normal. There is no distension.      Palpations: Abdomen is soft.      Tenderness: There is no abdominal tenderness. " "  Musculoskeletal:         General: No deformity. Normal range of motion.      Cervical back: Full passive range of motion without pain and neck supple.      Right lower leg: No edema.      Left lower leg: No edema.   Skin:     General: Skin is warm and dry.      Findings: No lesion or rash.      Nails: There is no clubbing.   Neurological:      Mental Status: He is alert and oriented to person, place, and time.      Comments: Grossly normal    Psychiatric:         Behavior: Behavior is cooperative.       I have reviewed and made appropriate changes to the review of systems input by the medical assistant.    Vitals:    07/29/24 1059   BP: 126/68   BP Location: Left arm   Patient Position: Sitting   Pulse: 58   Weight: 107 kg (236 lb)   Height: 6' 2\" (1.88 m)       Patient Active Problem List   Diagnosis    Mixed hyperlipidemia    Primary hypertension    Coronary artery disease involving native coronary artery of native heart without angina pectoris    S/P angioplasty with stent    Hx of CABG    S/P AAA repair    S/P prostatectomy    H/O prostate cancer    Type 2 diabetes mellitus with diabetic polyneuropathy, with long-term current use of insulin ([facility])    Varicose veins of left lower extremity    History of endovascular stent graft for abdominal aortic aneurysm (AAA)    Bruit (arterial)    Peripheral artery disease (HCC)    Type 2 diabetes mellitus with diabetic peripheral angiopathy without gangrene, with long-term current use of insulin (HCC)    Actinic keratoses    Acute kidney injury superimposed on chronic kidney disease  (HCC)    Platelets decreased (HCC)    Gross hematuria    Chronic HFpEF/Moderate pHTN (HFpEF) (HCC)    Mild aortic stenosis    Chronic kidney disease-mineral and bone disorder    Stable angina pectoris    Hypertensive urgency    Elevated troponin level not due myocardial infarction    Diabetic ulcer of right midfoot associated with diabetes mellitus due to underlying condition, limited to " breakdown of skin (HCC)    Acute on chronic diastolic heart failure (HCC)    Stage 3b chronic kidney disease (HCC)    Frequent PVCs    Acute respiratory distress    Melena    Symptomatic anemia    Multiple gastric ulcers    Iron deficiency anemia due to chronic blood loss    Duodenal ulcer    Acute blood loss anemia    History of colon polyps    Shortness of breath    Iron deficiency anemia    Acute deep vein thrombosis (DVT) of left peroneal vein (HCC)    Plantar fasciitis, right       Past Surgical History:   Procedure Laterality Date    ADENOIDECTOMY      CARDIAC CATHETERIZATION Left 10/19/2022    Procedure: Cardiac Left Heart Cath;  Surgeon: Danielle Pereira MD;  [facility];  Service: Cardiology    CARDIAC SURGERY  2002    3 cardiac bypass then angioplasty 7/2020    CHOLECYSTECTOMY      COLONOSCOPY  02/14/2024    CORONARY ARTERY BYPASS GRAFT      IR LOWER EXTREMITY ANGIOGRAM  11/01/2023    ME BYPASS W/VEIN FEMORAL-POPLITEAL Right 11/16/2023    Procedure: BYPASS FEMORAL-POPLITEAL WITH CRYO VEIN, RIGHT FEMORAL ENDARTERECTOMY;  Surgeon: Vasquez Clark MD;  [facility];  Service: Vascular    ME SLCTV CATHJ 3RD+ ORD SLCTV ABDL PEL/LXTR BRNCH Right 11/01/2023    Procedure: ARTERIOGRAM Right lower extremity arteriogram with CO2 via right groin access;  Surgeon: Vasquez Clark MD;  [facility]: [facility];  Service: Vascular    PROSTATE SURGERY      TONSILLECTOMY         Family History   Problem Relation Age of Onset    Diabetes Mother     Alcohol abuse Father     Heart disease Brother     Cancer Sister         Thyroid    Cancer Sister         Colon    Cancer Brother         Throat    Cancer Brother     Diabetes Sister     Mental illness Neg Hx        Social History     Socioeconomic History    Marital status:      Spouse name: Not on file    Number of children: 2    Years of education: Not on file    Highest education level: Some college, no degree    Occupational History    Not on file   Tobacco Use    Smoking status: Former     Current packs/day: 0.00     Average packs/day: 1.5 packs/day for 33.0 years (49.5 ttl pk-yrs)     Types: Cigarettes     Start date:      Quit date:      Years since quittin.6     Passive exposure: Past    Smokeless tobacco: Never   Vaping Use    Vaping status: Never Used   Substance and Sexual Activity    Alcohol use: Yes     Alcohol/week: 14.0 standard drinks of alcohol     Types: 14 Cans of beer per week     Comment: 1 -2 daily    Drug use: Never    Sexual activity: Not Currently     Partners: Female     Birth control/protection: Male Sterilization   Other Topics Concern    Not on file   Social History Narrative    Not on file     Social Determinants of Health     Financial Resource Strain: Low Risk  (10/31/2023)    Overall Financial Resource Strain (CARDIA)     Difficulty of Paying Living Expenses: Not hard at all   Food Insecurity: No Food Insecurity (2024)    Hunger Vital Sign     Worried About Running Out of Food in the Last Year: Never true     Ran Out of Food in the Last Year: Never true   Transportation Needs: No Transportation Needs (2024)    PRAPARE - Transportation     Lack of Transportation (Medical): No     Lack of Transportation (Non-Medical): No   Physical Activity: Not on file   Stress: Not on file   Social Connections: Not on file   Intimate Partner Violence: Not on file   Housing Stability: Low Risk  (2024)    Housing Stability Vital Sign     Unable to Pay for Housing in the Last Year: No     Number of Times Moved in the Last Year: 1     Homeless in the Last Year: No       Allergies   Allergen Reactions    Lisinopril Rash and Lip Swelling         Current Outpatient Medications:     acetaminophen (TYLENOL) 325 mg tablet, Take 2 tablets (650 mg total) by mouth every 6 (six) hours as needed for mild pain, Disp: , Rfl: 0    amLODIPine (NORVASC) 10 mg tablet, Take 0.5 tablets (5 mg total) by mouth  daily, Disp: 45 tablet, Rfl: 1    aspirin 81 mg chewable tablet, Chew 81 mg daily, Disp: , Rfl:     atorvastatin (LIPITOR) 40 mg tablet, Take 1 tablet (40 mg total) by mouth daily, Disp: 90 tablet, Rfl: 1    Blood Glucose Monitoring Suppl (OneTouch Verio Reflect) w/Device KIT, Check blood sugars twice daily. Please substitute with appropriate alternative as covered by patient's insurance. Dx: E11.65, Disp: 1 kit, Rfl: 0    cloNIDine (CATAPRES) 0.1 mg tablet, take 1 tablet by mouth every 12 hours, Disp: 180 tablet, Rfl: 1    Droplet Pen Needles 32G X 4 MM MISC, USE EVERY EVENING, Disp: 100 each, Rfl: 5    DULoxetine (Cymbalta) 30 mg delayed release capsule, Take 1 capsule (30 mg total) by mouth daily, Disp: 30 capsule, Rfl: 2    Empagliflozin (Jardiance) 25 MG TABS, TAKE 1 TABLET BY MOUTH DAILY, Disp: 90 tablet, Rfl: 1    fenofibrate 160 MG tablet, TAKE 1 TABLET BY MOUTH DAILY, Disp: 100 tablet, Rfl: 1    gabapentin (NEURONTIN) 600 MG tablet, Take 1 tablet (600 mg total) by mouth 3 (three) times a day, Disp: 90 tablet, Rfl: 0    glimepiride (AMARYL) 2 mg tablet, Take 1 tablet (2 mg total) by mouth 2 (two) times a day, Disp: 180 tablet, Rfl: 3    glucose blood (OneTouch Verio) test strip, TEST TWICE A DAY, Disp: 200 strip, Rfl: 5    insulin glargine (LANTUS) 100 units/mL subcutaneous injection, Inject 15 Units under the skin daily at bedtime, Disp: 10 mL, Rfl: 0    isosorbide mononitrate (IMDUR) 30 mg 24 hr tablet, Take 3 tablets (90 mg total) by mouth daily, Disp: 270 tablet, Rfl: 1    ketoconazole (NIZORAL) 2 % cream, Apply topically daily, Disp: 60 g, Rfl: 1    metFORMIN (GLUCOPHAGE) 500 mg tablet, Take 1 tablet (500 mg total) by mouth 2 (two) times a day with meals, Disp: 180 tablet, Rfl: 1    metoprolol tartrate (LOPRESSOR) 50 mg tablet, Take 0.5 tablets (25 mg total) by mouth every 12 (twelve) hours, Disp: 90 tablet, Rfl: 1    Multiple Vitamins-Minerals (MULTIVITAMIN MEN 50+ PO), Take by mouth daily, Disp: ,  Rfl:     nitroglycerin (NITROSTAT) 0.4 mg SL tablet, Place 1 tablet (0.4 mg total) under the tongue every 5 (five) minutes as needed for chest pain, Disp: 30 tablet, Rfl: 0    Omega-3 Fatty Acids (fish oil) 1,000 mg, Take 4,000 mg by mouth 2 (two) times a day, Disp: , Rfl:     ondansetron (ZOFRAN) 4 mg tablet, Take 4 mg by mouth every 8 (eight) hours as needed for nausea or vomiting, Disp: , Rfl:     OneTouch Delica Lancets 33G MISC, Check blood sugars twice daily. Please substitute with appropriate alternative as covered by patient's insurance. Dx: E11.65, Disp: 200 each, Rfl: 3    pantoprazole (PROTONIX) 40 mg tablet, Take 1 tablet (40 mg total) by mouth daily, Disp: 90 tablet, Rfl: 1    pentoxifylline (TRENtal) 400 mg ER tablet, Take 1 tablet (400 mg total) by mouth 3 (three) times a day with meals, Disp: 90 tablet, Rfl: 2    ranolazine (RANEXA) 1000 MG SR tablet, Take 1 tablet (1,000 mg total) by mouth 2 (two) times a day, Disp: 180 tablet, Rfl: 3    sitaGLIPtin (Januvia) 100 mg tablet, TAKE 1 TABLET BY MOUTH DAILY, Disp: 100 tablet, Rfl: 1    torsemide (DEMADEX) 20 mg tablet, TAKE 1 TABLET BY MOUTH DAILY, Disp: 90 tablet, Rfl: 1    warfarin (Coumadin) 2.5 mg tablet, Qd and as directed, Disp: 90 tablet, Rfl: 1    Elastic Bandages & Supports (Medical Compression Stockings) MISC, Use daily Knee High 15-20mmHg (Patient not taking: Reported on 2/1/2024), Disp: 2 each, Rfl: 4    ferrous sulfate 324 (65 Fe) mg, Take 1 tablet (324 mg total) by mouth 2 (two) times a day before meals, Disp: 180 tablet, Rfl: 1    warfarin (COUMADIN) 1 mg tablet, take 1 tablet by mouth once daily, Disp: 90 tablet, Rfl: 0    warfarin (COUMADIN) 3 mg tablet, take 1 tablet by mouth once daily, Disp: 90 tablet, Rfl: 0

## 2024-07-30 ENCOUNTER — TELEPHONE (OUTPATIENT)
Dept: VASCULAR SURGERY | Facility: CLINIC | Age: 79
End: 2024-07-30

## 2024-07-30 ENCOUNTER — OFFICE VISIT (OUTPATIENT)
Dept: PHYSICAL THERAPY | Facility: CLINIC | Age: 79
End: 2024-07-30
Payer: COMMERCIAL

## 2024-07-30 ENCOUNTER — TELEPHONE (OUTPATIENT)
Dept: NEPHROLOGY | Facility: CLINIC | Age: 79
End: 2024-07-30

## 2024-07-30 ENCOUNTER — LAB (OUTPATIENT)
Dept: LAB | Facility: CLINIC | Age: 79
End: 2024-07-30
Payer: COMMERCIAL

## 2024-07-30 DIAGNOSIS — M79.671 FOOT PAIN, BILATERAL: ICD-10-CM

## 2024-07-30 DIAGNOSIS — M79.672 HEEL PAIN, BILATERAL: ICD-10-CM

## 2024-07-30 DIAGNOSIS — M79.672 FOOT PAIN, BILATERAL: ICD-10-CM

## 2024-07-30 DIAGNOSIS — I82.452 ACUTE DEEP VEIN THROMBOSIS (DVT) OF LEFT PERONEAL VEIN (HCC): ICD-10-CM

## 2024-07-30 DIAGNOSIS — M72.2 PLANTAR FASCIITIS, RIGHT: Primary | ICD-10-CM

## 2024-07-30 DIAGNOSIS — M79.671 HEEL PAIN, BILATERAL: ICD-10-CM

## 2024-07-30 LAB
INR PPP: 3.48 (ref 0.84–1.19)
PROTHROMBIN TIME: 34.2 SECONDS (ref 11.6–14.5)

## 2024-07-30 PROCEDURE — 36415 COLL VENOUS BLD VENIPUNCTURE: CPT

## 2024-07-30 PROCEDURE — 97110 THERAPEUTIC EXERCISES: CPT

## 2024-07-30 PROCEDURE — 85610 PROTHROMBIN TIME: CPT

## 2024-07-30 NOTE — PROGRESS NOTES
Daily Note     Today's date: 2024  Patient name: Thomas Horne  : 1945  MRN: [id number]  Referring provider: Lachman, James R, MD  Dx:   Encounter Diagnosis     ICD-10-CM    1. Plantar fasciitis, right  M72.2       2. Heel pain, bilateral  M79.671     M79.672       3. Foot pain, bilateral  M79.671     M79.672                      Subjective: Pt seen by podiatrist 24 to address wounds; MD referred pt to vascular surgery for workup of PAD. Pt states that he had an appointment yesterday with vascular MD, 2024. Per chart review pt was educated on symptoms which would warrant need to present to ED. Intervention is to be determined. Pt states that his impression from appointment with vascular MD was that vascular procedure would be performed in a week or two if needed.       Objective: See treatment diary below      Assessment: No increase in pain reported with therapeutic exercises performed. Pt able to complete all exercises in session with sock and shoes on today. Added backward walk and side step at rail. Tolerated treatment well. Patient would benefit from continued PT.       Plan: Continue per plan of care.  Progress treatment as tolerated.       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)     Plantar fasciitis     Sleep apnea     Could not tolerate CPAP     Past Surgical History:   Procedure Laterality Date    ADENOIDECTOMY      CARDIAC CATHETERIZATION Left 10/19/2022    Procedure: Cardiac Left Heart Cath;  Surgeon: Danielle Pereira MD;   "[facility];  Service: Cardiology    CARDIAC SURGERY  2002    3 cardiac bypass then angioplasty 7/2020    CHOLECYSTECTOMY      COLONOSCOPY  02/14/2024    CORONARY ARTERY BYPASS GRAFT      IR LOWER EXTREMITY ANGIOGRAM  11/01/2023    AR BYPASS W/VEIN FEMORAL-POPLITEAL Right 11/16/2023    Procedure: BYPASS FEMORAL-POPLITEAL WITH CRYO VEIN, RIGHT FEMORAL ENDARTERECTOMY;  Surgeon: Vasquez Clark MD;  [facility];  Service: Vascular    AR SLCTV CATHJ 3RD+ ORD SLCTV ABDL PEL/LXTR BRNCH Right 11/01/2023    Procedure: ARTERIOGRAM Right lower extremity arteriogram with CO2 via right groin access;  Surgeon: Vasquez Clark MD;  [facility];  Service: Vascular    PROSTATE SURGERY      TONSILLECTOMY           SOC: 7/16/2024  FOTO: 7/30/2024  POC Expiration: 10/8/2024  Daily Treatment Log  Date: Initial Evaluation  7/16/2024 7/18/2024 7/23/2024 7/25/2024 7/30/2024   Visit#/ auth: 1  2  3  4  5 FOTO   Objective Measures                           Manuals              PF rolling on therabar   1 x 10 R/L; instructed not to perform over heel d/t pain 1 x 20 midfoot B/L         STM to PF                                        Neuro Re-Ed    10'  10'  5'  5' w/ socks and sneakers   Toe scrunches/ extension    20x ea. B/L  20x ea. B/L, 2x; alt w/ HR and TR  20x ea supported long sit      Porcupine / drop    Unable to lift marble B/L  Held held     backward walk at rail (as tolerable)     Forward strides: at window sill 6' x 4 laps w/ uni UE support held  x 3 full laps (8') w/ rail   Side step at rail      x 3 full laps (8') w/ rail    tandem stance       Semi tandem 30\" x 1 R/L w/ CS; full tandem 30\" x 1 R/L w/ CS  held      Ther Ex    30'  30'  33'  30' w/ socks and sneakers   LAQ w/ DF    2 x 5 R/L  2 x 5 R/L 1 x 10 R/L    Big toe stretch w/ sos    10\" x 4 R/L 10\" x 4 R/L       Seated gastroc stretch Long sit 30\" x 2 R/L 30\" x 2 R/L 10\" x 4 R/L w/ sos 20\" x 3 w/ towel " "under sos 20\" x 3 w/ sos   Seated HR/TR  1 x 15 ea.  1 x 15 ea.  2 x 15 ea.; alt w/ toe scrunches  2 x 15 w/ sneakers 1 x 15 ea.   Seated [place] board ankle AROM   1 x 10 fwb/bwk/lateral R/L    1 x 10 fwb/bwk/lateral R/L w/ sneakers Large wobble board PF/DF 5\" hold each end x 10    Lateral 1 x 10 R/L   4 way ankle AROM  1 x 10 ea. Seated B/L 1 x 20 DF/PF and inv/ev w/ heel prop R/L supine w/ HOB elevated for comfort 1 x 20 PF/DF Inv/EV sitting    1 x 10 YTB 4-way supported long sit    Seated heels slides     5\" x 10 R/L 1 x 15 R/L Held d/t wounds noted on observation 5\" DF x 15 R/L w/ sneakers   EDUCATION: Pathology, review of impairements, prognosis, activity modification, POC, and HEP  KE HEP updated and reviewed         Ther Activity             RDL for active HS stretch              STS             Gait Training                                         Modalities                                         HEP:  Access Code: [email or web address]  URL: [email or web address]  Date: 07/18/2024  Prepared by: Michell Szymanski    Exercises  - Seated Heel Raise  - 2 x daily - 1 sets - 15 reps  - Seated Toe Raise  - 2 x daily - 1 sets - 15 reps  - Seated Gastroc Stretch with Strap  - 2 x daily - 3 sets - 30 seconds hold  - Seated Toe Curl  - 2 x daily - 1 sets - 20 reps  - Supine Ankle Dorsiflexion and Plantarflexion AROM  - 2 x daily - 1 sets - 10 reps  - Supine Ankle Inversion and Eversion AROM  - 2 x daily - 1 sets - 10 reps           "

## 2024-07-30 NOTE — TELEPHONE ENCOUNTER
"----- Message from Nori Pena PA-C sent at 7/29/2024  5:07 PM EDT -----  Dr. Fischer  [facility]! Thomas was seen in the office urgently today for chronic limb threatening ischemia and L foot wounds.  I spoke with Dr. Clark who agrees that we should \"halina him up\" for an angiogram with CO2 and intervention. He does have CKD.     He claims you as his nephrologist, so I am reaching out to you for renal recommendations in order for him to proceed with angiogram.  We will take your recommendations for hydration, etc.  However, if you need to see him first, just let us know.     Thanks,   Nori"

## 2024-07-30 NOTE — TELEPHONE ENCOUNTER
I was informed by vascular surgery of the patient's need for an angiogram.     I have personally discussed the risks and benefits of the surgery from a renal standpoint with the patient in depth, and advised the patient about the risk of IVY and the course of IVY if it occurs with the small probability of the need for renal replacement therapy in the worse case scenario. Patient voiced understanding.    From a renal standpoint the patient is renally optimized for angiogram with the following recommendations:    Fluids to administer: Other - NS at 100 cc/hr x 2 hours prior to angiogram and 4 hours after angiogram      Medication Recommendations:  Minimize any IV contrast use  Hold torsemide starting on the day of surgery/procedure    Hemodynamic Recommendations:  Ideally, target MAPs greater than 65 mmHg in the perioperative period, and minimize operative time with MAPs < 65 mmHg.   Avoid intraop hemodynamic instability to decrease risk for IVY occurrence.    Other Recommendations:  Check BMP 1 week after the procedure.

## 2024-07-31 ENCOUNTER — OFFICE VISIT (OUTPATIENT)
Dept: PHYSICAL THERAPY | Facility: CLINIC | Age: 79
End: 2024-07-31
Payer: COMMERCIAL

## 2024-07-31 ENCOUNTER — DOCUMENTATION (OUTPATIENT)
Dept: VASCULAR SURGERY | Facility: CLINIC | Age: 79
End: 2024-07-31

## 2024-07-31 DIAGNOSIS — M77.41 METATARSALGIA OF BOTH FEET: ICD-10-CM

## 2024-07-31 DIAGNOSIS — E11.42 DIABETIC POLYNEUROPATHY ASSOCIATED WITH TYPE 2 DIABETES MELLITUS (HCC): ICD-10-CM

## 2024-07-31 DIAGNOSIS — M72.2 PLANTAR FASCIITIS, RIGHT: Primary | ICD-10-CM

## 2024-07-31 DIAGNOSIS — M79.672 FOOT PAIN, BILATERAL: ICD-10-CM

## 2024-07-31 DIAGNOSIS — M77.42 METATARSALGIA OF BOTH FEET: ICD-10-CM

## 2024-07-31 DIAGNOSIS — M79.671 FOOT PAIN, BILATERAL: ICD-10-CM

## 2024-07-31 DIAGNOSIS — M79.672 HEEL PAIN, BILATERAL: ICD-10-CM

## 2024-07-31 DIAGNOSIS — M54.16 RADICULOPATHY OF LUMBAR REGION: ICD-10-CM

## 2024-07-31 DIAGNOSIS — M79.671 HEEL PAIN, BILATERAL: ICD-10-CM

## 2024-07-31 PROCEDURE — 97110 THERAPEUTIC EXERCISES: CPT

## 2024-07-31 PROCEDURE — 97112 NEUROMUSCULAR REEDUCATION: CPT

## 2024-07-31 RX ORDER — GABAPENTIN 600 MG/1
600 TABLET ORAL 3 TIMES DAILY
Qty: 100 TABLET | Refills: 1 | Status: SHIPPED | OUTPATIENT
Start: 2024-07-31

## 2024-07-31 NOTE — PROGRESS NOTES
See complete office note 7/29/24    Chronic limb threatening ischemia with left heel and left second toe wound     Peripheral arterial diseae     S/P R fem-pop bypass '23 with known >75% prox anastomosis stenosis (known occluded)     -Added on for urgent OV; known to Dr. Clark  -New bilateral foot and heel pain x 1 month with pain on standing and walking  -New L heel ulceration and L 2nd toe ulcer     -NILS 6/21/24    R 0.73/NC/31; >75% prox anastomosis CFA- BK popliteal artery bypass graft ()    Monophasic waveforms in remainder of bypass graft. Tib/Peroneal occlusive disease    L 0.38/38/28; Occlusion dSFA with recon distally     Plan:   -Severe atherosclerosis of the lower extremity with left foot wounds with new L SFA occlusion c/w NILS from last year.   -L foot wounds with new occlusion of dSFA  -Medical therapy includes: aspirin, warfarin and atorvastatin  -Recommend LLE angiogram and intervention with CO2 but has CKD IIIb and would need nephrology so will review with surgeon. He is also on warfarin which would need to be held.    Addendum:  -Reviewed with Dr. Clark  -Recommend proceeding with LLE CO2 angiogram +/- intervention, if indicated  -Dr. Fischer send message regarding hydration - 100 cc/hr x 2 hours pre and 100 cc x 4 hours post and should hold diuretic prior to procedure.  -Will need to hold warfarin for 5 days (indication DVT in April '24 which has been treated for 3 months now)  -I don't think we necessarily need to bridge him at this point but will follow up with Dr. Clark.    -See op info below.           Operative Scheduling Information:    Hospital:  [facility] or [facility] or IR    Physician:  Amber or CECIL    Surgery: LLE angiogram with CO2 +/- intervention    Urgency:  Standard but has foot wounds    Level:  Level 3: Outpatients to be scheduled for elective surgery than can be delayed up to 4 weeks without reasonable expectation of detriment to patient    Case  Length:  Normal    Post-op Bed:  Outpatient    OR Table:  Standard    Equipment Needs:  None    Medication Instructions:  Aspirin:   Continue (do not hold)  Coumadin:  Hold for 5 days prior to procedure    Hydration:  100 cc/hr for 2 hour prior and 4 hours after procedure    Contrast Allergy:  no

## 2024-07-31 NOTE — PROGRESS NOTES
Daily Note     Today's date: 2024  Patient name: Thomas Horne  : 1945  MRN: [id number]  Referring provider: Lachman, James R, MD  Dx:   Encounter Diagnosis     ICD-10-CM    1. Plantar fasciitis, right  M72.2       2. Heel pain, bilateral  M79.671     M79.672       3. Foot pain, bilateral  M79.671     M79.672                      Subjective: Pt believes that he has a follow-up with podiatrist next week. He would like to continue PT. He states that the exercises last session neither improved nor worsened his pain.       Objective: See treatment diary below      Assessment: Able to progress resistance of 4-way ankle today. Pt introduced to standing gastroc stretch on slant board which offered appropriate stretch. Pt utilizing rail support with backward walk and side stepping. Plan to progress standing exercises next session by progressing to side step without rail support if able. Tolerated treatment well. Patient would benefit from continued PT.       Plan: Continue per plan of care.  Progress treatment as tolerated.       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)     Plantar fasciitis     Sleep apnea     Could not tolerate CPAP     Past Surgical History:   Procedure Laterality Date    ADENOIDECTOMY      CARDIAC CATHETERIZATION Left 10/19/2022    Procedure: Cardiac Left Heart Cath;  Surgeon: Danielle Pereira MD;  [facility];  Service: Cardiology    CARDIAC SURGERY  2002    3 cardiac bypass then angioplasty 2020    CHOLECYSTECTOMY    "   COLONOSCOPY  02/14/2024    CORONARY ARTERY BYPASS GRAFT      IR LOWER EXTREMITY ANGIOGRAM  11/01/2023    NM BYPASS W/VEIN FEMORAL-POPLITEAL Right 11/16/2023    Procedure: BYPASS FEMORAL-POPLITEAL WITH CRYO VEIN, RIGHT FEMORAL ENDARTERECTOMY;  Surgeon: Vasquez Clark MD;  [facility];  Service: Vascular    NM SLCTV CATHJ 3RD+ ORD SLCTV ABDL PEL/LXTR BRNCH Right 11/01/2023    Procedure: ARTERIOGRAM Right lower extremity arteriogram with CO2 via right groin access;  Surgeon: Vasquez Clark MD;  [facility];  Service: Vascular    PROSTATE SURGERY      TONSILLECTOMY           SOC: 7/16/2024  FOTO: 7/30/2024  POC Expiration: 10/8/2024  Daily Treatment Log  Date: 7/31/2024 Next session  7/23/2024 7/25/2024 7/30/2024   Visit#/ auth: 6   3  4  5 FOTO   Objective Measures                       Manuals            PF rolling on therabar   1 x 20 midfoot B/L         STM to PF                                   Neuro Re-Ed 10' w/ socks and sneakers   10'  5'  5' w/ socks and sneakers   Toe scrunches/ extension Holding   20x ea. B/L, 2x; alt w/ HR and TR  20x ea supported long sit     backward walk at rail (as tolerable) x 3 full laps (8') w/ rail  Forward strides: at window sill 6' x 4 laps w/ uni UE support held  x 3 full laps (8') w/ rail   Side step at rail  x 3 full laps (8') w/ rail    x 3 full laps (8') w/ rail    tandem stance  Full tandem 30\" x 1 R/L w/ CS   Semi tandem 30\" x 1 R/L w/ CS; full tandem 30\" x 1 R/L w/ CS  held      Ther Ex 30' w/ socks and sneakers   30'  33'  30' w/ socks and sneakers   LAQ w/ DF  1 x 10 R/L   2 x 5 R/L  2 x 5 R/L 1 x 10 R/L    Big toe stretch w/ sos   10\" x 4 R/L       Seated gastroc stretch Slant board 10\" x 5 R/L     20\" x 3 R/L w/sos and heel on stool  10\" x 4 R/L w/ sos 20\" x 3 w/ towel under sos 20\" x 3 w/ sos   Seated HR/TR 1 x 15 ea.   2 x 15 ea.; alt w/ toe scrunches  2 x 15 w/ sneakers 1 x 15 ea.   Seated [place] board ankle AROM Large " "wobble board PF/DF 5\" hold each end x 10    Lateral 1 x 10 R/L     1 x 10 fwb/bwk/lateral R/L w/ sneakers Large wobble board PF/DF 5\" hold each end x 10    Lateral 1 x 10 R/L   4 way ankle AROM 1 x 10 RTB 4-way supported long sit Progress resistance this visit 1 x 20 DF/PF and inv/ev w/ heel prop R/L supine w/ HOB elevated for comfort 1 x 20 PF/DF Inv/EV sitting    1 x 10 YTB 4-way supported long sit    Seated heels slides  5\" DF x 15 R/L w/ sneakers  1 x 15 R/L Held d/t wounds noted on observation 5\" DF x 15 R/L w/ sneakers   EDUCATION: Pathology, review of impairements, prognosis, activity modification, POC, and HEP HEP updated and reviewed          Ther Activity           RDL for active HS stretch            STS           Gait Training                                   Modalities                                   HEP:  Access Code: [email or web address]  URL: [email or web address]  Date: 07/31/2024  Prepared by: Michell Szymanski    Program Notes  Perform heel raises, toe raises, and band exercises with socks and shoes on.     Exercises  - Seated Heel Raise  - 2 x daily - 1 sets - 15 reps  - Seated Toe Raise  - 2 x daily - 1 sets - 15 reps  - Seated Gastroc Stretch with Strap  - 2 x daily - 3 sets - 30 seconds hold  - Supine Ankle Dorsiflexion and Plantarflexion AROM  - 2 x daily - 1 sets - 10 reps  - Supine Ankle Inversion and Eversion AROM  - 2 x daily - 1 sets - 10 reps  - Seated Long Arc Quad  - 1 x daily - 1 sets - 10 reps  - Ankle and Toe Plantarflexion with Resistance  - 1 x daily - 1 sets - 10 reps       "

## 2024-07-31 NOTE — TELEPHONE ENCOUNTER
"Nori Pena PA-C  P [facility] Surgery Coordinator; Vasquez Clark MD  Let's schedule Thomas for LLE angiogram with CO2 +/- intervention with Dr. Clark. I left an updated progress note with Op Info. -RD          Previous Messages       ----- Message -----  From: Jose Fischer MD  Sent: 7/30/2024   5:50 PM EDT  To: Nori Pena PA-C; *    Franklin Garcia,    Yes, I see Thomas for CKD. He had recent labs done on 7/22/24 and his renal function is stable. He can proceed with the angiogram from my standpoint. Would recommend holding Torsemide on the day of the angiogram and giving him NS at 100 cc/hr x 2 hours prior and 4 hours after the angiogram.    Thanks!    Dr. Fischer  ----- Message -----  From: Nori Pena PA-C  Sent: 7/29/2024   5:11 PM EDT  To: Jose Fischer MD; *    Franklin Delaney! Thomas was seen in the office urgently today for chronic limb threatening ischemia and L foot wounds.  I spoke with Dr. Clark who agrees that we should \"halina him up\" for an angiogram with CO2 and intervention. He does have CKD.    He claims you as his nephrologist, so I am reaching out to you for renal recommendations in order for him to proceed with angiogram.  We will take your recommendations for hydration, etc.  However, if you need to see him first, just let us know.    Thanks,  Nori"

## 2024-08-01 ENCOUNTER — PREP FOR PROCEDURE (OUTPATIENT)
Dept: VASCULAR SURGERY | Facility: CLINIC | Age: 79
End: 2024-08-01

## 2024-08-01 ENCOUNTER — ANESTHESIA EVENT (OUTPATIENT)
Dept: PERIOP | Facility: HOSPITAL | Age: 79
End: 2024-08-01
Payer: COMMERCIAL

## 2024-08-01 DIAGNOSIS — E11.621 DIABETIC ULCER OF LEFT HEEL ASSOCIATED WITH TYPE 2 DIABETES MELLITUS, LIMITED TO BREAKDOWN OF SKIN (HCC): Primary | ICD-10-CM

## 2024-08-01 DIAGNOSIS — L97.421 DIABETIC ULCER OF LEFT HEEL ASSOCIATED WITH TYPE 2 DIABETES MELLITUS, LIMITED TO BREAKDOWN OF SKIN (HCC): Primary | ICD-10-CM

## 2024-08-01 NOTE — PRE-PROCEDURE INSTRUCTIONS
Pre-Surgery Instructions:   Medication Instructions    acetaminophen (TYLENOL) 325 mg tablet Uses PRN- OK to take day of surgery    amLODIPine (NORVASC) 10 mg tablet Take day of surgery.    aspirin 81 mg chewable tablet Take day of surgery.    atorvastatin (LIPITOR) 40 mg tablet Take night before surgery    cloNIDine (CATAPRES) 0.1 mg tablet Take day of surgery.    DULoxetine (Cymbalta) 30 mg delayed release capsule Take day of surgery.    Empagliflozin (Jardiance) 25 MG TABS Stop taking 4 days prior to surgery.    fenofibrate 160 MG tablet Take night before surgery    gabapentin (NEURONTIN) 600 MG tablet Take day of surgery.    glimepiride (AMARYL) 2 mg tablet Hold day of surgery.    insulin glargine (LANTUS) 100 units/mL subcutaneous injection Uses PRN- OK to take day of surgery    isosorbide mononitrate (IMDUR) 30 mg 24 hr tablet Take day of surgery.    metFORMIN (GLUCOPHAGE) 500 mg tablet Hold day of surgery.    metoprolol tartrate (LOPRESSOR) 50 mg tablet Take day of surgery.    Multiple Vitamins-Minerals (MULTIVITAMIN MEN 50+ PO) Stop taking 5 days prior to surgery.    nitroglycerin (NITROSTAT) 0.4 mg SL tablet Uses PRN- OK to take day of surgery    Omega-3 Fatty Acids (fish oil) 1,000 mg Stop taking 5 days prior to surgery.    ondansetron (ZOFRAN) 4 mg tablet Uses PRN- OK to take day of surgery    pantoprazole (PROTONIX) 40 mg tablet Take day of surgery.    pentoxifylline (TRENtal) 400 mg ER tablet Hold day of surgery.    ranolazine (RANEXA) 1000 MG SR tablet Take day of surgery.    sitaGLIPtin (Januvia) 100 mg tablet Hold day of surgery.    torsemide (DEMADEX) 20 mg tablet Hold day of surgery.    warfarin (Coumadin) 2.5 mg tablet Stop taking 5 days prior to surgery. LD 8/1   Medication instructions for day surgery reviewed. Please use only a sip of water to take your instructed medications. Avoid all over the counter vitamins, supplements and NSAIDS for one week prior to surgery per anesthesia guidelines.  Tylenol is ok to take as needed.     You will receive a call one business day prior to surgery with an arrival time and hospital directions. If your surgery is scheduled on a Monday, the hospital will be calling you on the Friday prior to your surgery. If you have not heard from anyone by 8pm, please call the hospital supervisor through the hospital  at [phone]. ([place] [phone] or [place] [phone]).    Do not eat or drink anything after midnight the night before your surgery, including candy, mints, lifesavers, or chewing gum. Do not drink alcohol 24hrs before your surgery. Try not to smoke at least 24hrs before your surgery.       Follow the pre surgery showering instructions as listed in the “My Surgical Experience Booklet” or otherwise provided by your surgeon's office. Do not use a blade to shave the surgical area 1 week before surgery. It is okay to use a clean electric clippers up to 24 hours before surgery. Do not apply any lotions, creams, including makeup, cologne, deodorant, or perfumes after showering on the day of your surgery. Do not use dry shampoo, hair spray, hair gel, or any type of hair products.     No contact lenses, eye make-up, or artificial eyelashes. Remove nail polish, including gel polish, and any artificial, gel, or acrylic nails if possible. Remove all jewelry including rings and body piercing jewelry.     Wear causal clothing that is easy to take on and off. Consider your type of surgery.    Keep any valuables, jewelry, piercings at home. Please bring any specially ordered equipment (sling, braces) if indicated.    Arrange for a responsible person to drive you to and from the hospital on the day of your surgery. Please confirm the visitor policy for the day of your procedure when you receive your phone call with an arrival time.     Call the surgeon's office with any new illnesses, exposures, or additional questions prior to surgery.    Please reference your “My  Surgical Experience Booklet” for additional information to prepare for your upcoming surgery.

## 2024-08-01 NOTE — TELEPHONE ENCOUNTER
Verified patient's insurance   CONFIRMED - Patient's insurance is [employer]  REP  Is patient requesting a call when authorization has been obtained? Patient did not request a call.    Surgery Date: 8/7/24  Primary Surgeon: CAP // Vasquez Clark (NPI: [id number])  Assisting Surgeon: Not Applicable (N/A)  Facility: [facility] (Tax: [id number] / NPI: [id number])  Inpatient / Outpatient: Outpatient  Level: 3    Clearance Received: No clearance ordered.  Consent Received: Consent will be signed day of procedure.  Medication Hold / Last Dose:  No hold on ASA, hold Coumadin 5 days prior last dose 8/1/24  IR Notified:  Notified 8/1/24  Rep. Notified: Not Applicable (N/A)  Equipment Needs: Not Applicable (N/A)  V[facility] Lab Requested: Not Applicable (N/A)  Patient Contacted: 8/1/24 - Jaci    Diagnosis: I70.209. E11.621, L97.421  Procedure/ CPT Code(s): Angiogram // CPT: 85762, 76341, and 13058 // Procedure to take place in OR [Auth/ Cert Based]    For varicose vein related procedures:   Last LEVDR: Not Applicable (N/A)  CEAP Classification: Not Applicable (N/A)  VCSS: Not Applicable (N/A)    Post Operative Date/ Time: 8/22/24 , 3p [place] with Vasquez Clark (NPI: [id number])     *Please review medication hold(s), PATs, and check H&P with patient.*  PATIENT WAS MAILED SURGERY/SHOWERING/DISCHARGE/COVID INSTRUCTIONS AFTER REVIEWING WITH THEM VIA PHONE CALL.

## 2024-08-02 ENCOUNTER — APPOINTMENT (OUTPATIENT)
Dept: LAB | Facility: CLINIC | Age: 79
End: 2024-08-02
Payer: COMMERCIAL

## 2024-08-02 DIAGNOSIS — E11.621 DIABETIC ULCER OF LEFT HEEL ASSOCIATED WITH TYPE 2 DIABETES MELLITUS, LIMITED TO BREAKDOWN OF SKIN (HCC): ICD-10-CM

## 2024-08-02 DIAGNOSIS — L97.421 DIABETIC ULCER OF LEFT HEEL ASSOCIATED WITH TYPE 2 DIABETES MELLITUS, LIMITED TO BREAKDOWN OF SKIN (HCC): ICD-10-CM

## 2024-08-02 LAB
ANION GAP SERPL CALCULATED.3IONS-SCNC: 9 MMOL/L (ref 4–13)
BUN SERPL-MCNC: 38 MG/DL (ref 5–25)
CALCIUM SERPL-MCNC: 9.6 MG/DL (ref 8.4–10.2)
CHLORIDE SERPL-SCNC: 102 MMOL/L (ref 96–108)
CO2 SERPL-SCNC: 30 MMOL/L (ref 21–32)
CREAT SERPL-MCNC: 1.86 MG/DL (ref 0.6–1.3)
GFR SERPL CREATININE-BSD FRML MDRD: 33 ML/MIN/1.73SQ M
GLUCOSE P FAST SERPL-MCNC: 65 MG/DL (ref 65–99)
POTASSIUM SERPL-SCNC: 4.7 MMOL/L (ref 3.5–5.3)
SODIUM SERPL-SCNC: 141 MMOL/L (ref 135–147)

## 2024-08-02 PROCEDURE — 36415 COLL VENOUS BLD VENIPUNCTURE: CPT

## 2024-08-02 PROCEDURE — 80048 BASIC METABOLIC PNL TOTAL CA: CPT

## 2024-08-05 NOTE — ANESTHESIA PREPROCEDURE EVALUATION
Procedure:  LLE CO2 angiogram +/- intervention (Left: Abdomen)    Relevant Problems   CARDIO   (+) Acute deep vein thrombosis (DVT) of left peroneal vein (HCC)   (+) Coronary artery disease involving native coronary artery of native heart without angina pectoris   (+) Frequent PVCs   (+) Hypertensive urgency   (+) Mild aortic stenosis   (+) Mixed hyperlipidemia   (+) Primary hypertension   (+) Stable angina pectoris   (+) Type 2 diabetes mellitus with diabetic peripheral angiopathy without gangrene, with long-term current use of insulin (HCC)      ENDO   (+) Type 2 diabetes mellitus with diabetic peripheral angiopathy without gangrene, with long-term current use of insulin (HCC)   (+) Type 2 diabetes mellitus with diabetic polyneuropathy, with long-term current use of insulin (HCC)      GI/HEPATIC   (+) Duodenal ulcer   (+) Multiple gastric ulcers      /RENAL   (+) Acute kidney injury superimposed on chronic kidney disease  (HCC)   (+) Chronic kidney disease-mineral and bone disorder   (+) Stage 3b chronic kidney disease (HCC)      HEMATOLOGY   (+) Acute blood loss anemia   (+) Iron deficiency anemia   (+) Iron deficiency anemia due to chronic blood loss   (+) Symptomatic anemia      NEURO/PSYCH   (+) Stable angina pectoris      PULMONARY   (+) Shortness of breath     Per nephrology 7/30: Fluids to administer:  NS at 100 cc/hr x 2 hours prior to angiogram and 4 hours after angiogram       Physical Exam    Airway    Mallampati score: I  TM Distance: >3 FB  Neck ROM: full     Dental       Cardiovascular  Cardiovascular exam normal    Pulmonary  Pulmonary exam normal     Other Findings        Anesthesia Plan  ASA Score- 3     Anesthesia Type- IV sedation with anesthesia with ASA Monitors.         Additional Monitors:     Airway Plan:     Comment: GA d/w pt consent obtained if needed .       Plan Factors-Exercise tolerance (METS): >4 METS.    Chart reviewed.   Existing labs reviewed. Patient summary  reviewed.    Patient is not a current smoker.      Obstructive sleep apnea risk education given perioperatively.        Induction- intravenous.    Postoperative Plan- Plan for postoperative opioid use.     Perioperative Resuscitation Plan - Level 1 - Full Code.       Informed Consent- Anesthetic plan and risks discussed with patient and spouse.

## 2024-08-06 ENCOUNTER — OFFICE VISIT (OUTPATIENT)
Dept: PHYSICAL THERAPY | Facility: CLINIC | Age: 79
End: 2024-08-06
Payer: COMMERCIAL

## 2024-08-06 DIAGNOSIS — M79.672 FOOT PAIN, BILATERAL: ICD-10-CM

## 2024-08-06 DIAGNOSIS — M79.672 HEEL PAIN, BILATERAL: ICD-10-CM

## 2024-08-06 DIAGNOSIS — M72.2 PLANTAR FASCIITIS, RIGHT: Primary | ICD-10-CM

## 2024-08-06 DIAGNOSIS — M79.671 FOOT PAIN, BILATERAL: ICD-10-CM

## 2024-08-06 DIAGNOSIS — M79.671 HEEL PAIN, BILATERAL: ICD-10-CM

## 2024-08-06 PROCEDURE — 97140 MANUAL THERAPY 1/> REGIONS: CPT | Performed by: PHYSICAL THERAPIST

## 2024-08-06 PROCEDURE — 97110 THERAPEUTIC EXERCISES: CPT | Performed by: PHYSICAL THERAPIST

## 2024-08-06 NOTE — PROGRESS NOTES
Daily Note     Today's date: 2024  Patient name: Thomas Horne  : 1945  MRN: [id number]  Referring provider: Lachman, James R, MD  Dx:   Encounter Diagnosis     ICD-10-CM    1. Plantar fasciitis, right  M72.2       2. Heel pain, bilateral  M79.671     M79.672       3. Foot pain, bilateral  M79.671     M79.672                      Subjective: Pt having vascular procedure tomorrow L LE to help w/ circulation in his left leg/foot in hopes of facilitating healing of his 2 left foot ulcers (heel and 2nd toe). These are bandaged today, pt changes bandage daily. L foot is mostly purple in color w/ observation w/ sock off. He reports no relief as of yet w/ PT.      Objective: See treatment diary below; added manual ankle PROM to increase focus limited ankle ROM L>R.      Assessment: Tolerated treatment well and was asked to let us know next visit if he felt changes to program today were any better or worse . Patient would benefit from continued PT and focus on ankle mobility as his gait is very stiff.       Plan: Continue per plan of care.  Canceled appt for  due to vascular procedure tomorrow.     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)     Plantar fasciitis     Sleep apnea     Could not tolerate CPAP     Past Surgical History:   Procedure Laterality Date    ADENOIDECTOMY      CARDIAC CATHETERIZATION Left 10/19/2022    Procedure: Cardiac Left Heart Cath;  Surgeon: Danielle Pereira MD;  [facility];  Service: Cardiology    CARDIAC SURGERY   "2002    3 cardiac bypass then angioplasty 7/2020    CHOLECYSTECTOMY      COLONOSCOPY  02/14/2024    CORONARY ARTERY BYPASS GRAFT      IR LOWER EXTREMITY ANGIOGRAM  11/01/2023    [place] BYPASS W/VEIN FEMORAL-POPLITEAL Right 11/16/2023    Procedure: BYPASS FEMORAL-POPLITEAL WITH CRYO VEIN, RIGHT FEMORAL ENDARTERECTOMY;  Surgeon: Vasquez Clark MD;  [facility];  Service: Vascular    MN SLCTV CATHJ 3RD+ ORD SLCTV ABDL PEL/LXTR BRNCH Right 11/01/2023    Procedure: ARTERIOGRAM Right lower extremity arteriogram with CO2 via right groin access;  Surgeon: Vasquez Clark MD;  [facility];  Service: Vascular    PROSTATE SURGERY      TONSILLECTOMY           SOC: 7/16/2024  FOTO: 7/30/2024  POC Expiration: 10/8/2024  Daily Treatment Log  Date: 7/31/2024 8/6/2024 7/25/2024 7/30/2024   Visit#/ auth: 6 7   4  5 FOTO   Objective Measures                     Manuals  15'         PF rolling on therabar           STM to PF  PROM DF/PF w knee ext and w/ knee flexed; ankle circles cw/ccw; all 2x10 ea R & L in prone w/ shoes off (socked feet avoiding L foot ulcers)                              Neuro Re-Ed 10' w/ socks and sneakers    5'  5' w/ socks and sneakers   Toe scrunches/ extension Holding    20x ea supported long sit     backward walk at rail (as tolerable) x 3 full laps (8') w/ rail   held  x 3 full laps (8') w/ rail   Side step at rail  x 3 full laps (8') w/ rail    x 3 full laps (8') w/ rail    tandem stance  Full tandem 30\" x 1 R/L w/ CS    held      Ther Ex 30' w/ socks and sneakers 30' w/ socks/sneakers   33'  30' w/ socks and sneakers   Recumb bike  L1x5'      LAQ w/ DF  1 x 10 R/L  1x10 R/L  2 x 5 R/L 1 x 10 R/L    Stand rockerboard for ROM AP&ML  20x ea B/L stance w/ rail        Seated gastroc stretch Slant board 10\" x 5 R/L     20\" x 3 R/L w/sos and heel on stool   20\" x 3 w/ towel under sos 20\" x 3 w/ sos   Seated HR/TR 1 x 15 ea. 1x15 ea   2 x 15 w/ sneakers 1 x 15 ea. " "  Seated [place] board ankle AROM Large wobble board PF/DF 5\" hold each end x 10    Lateral 1 x 10 R/L    1 x 10 fwb/bwk/lateral R/L w/ sneakers Large wobble board PF/DF 5\" hold each end x 10    Lateral 1 x 10 R/L   4 way ankle AROM 1 x 10 RTB 4-way supported long sit PF looped grn 20x R/L  Inv ever single grn 2x10 ea R/L  1 x 20 PF/DF Inv/EV sitting    1 x 10 YTB 4-way supported long sit    Seated heels slides  5\" DF x 15 R/L w/ sneakers   Held d/t wounds noted on observation 5\" DF x 15 R/L w/ sneakers   EDUCATION: Pathology, review of impairements, prognosis, activity modification, POC, and HEP HEP updated and reviewed         Ther Activity          RDL for active HS stretch           STS          Gait Training                                Modalities                                HEP:  Access Code: [email or web address]  URL: [email or web address]  Date: 07/31/2024  Prepared by: Michell Szymanski    Program Notes  Perform heel raises, toe raises, and band exercises with socks and shoes on.     Exercises  - Seated Heel Raise  - 2 x daily - 1 sets - 15 reps  - Seated Toe Raise  - 2 x daily - 1 sets - 15 reps  - Seated Gastroc Stretch with Strap  - 2 x daily - 3 sets - 30 seconds hold  - Supine Ankle Dorsiflexion and Plantarflexion AROM  - 2 x daily - 1 sets - 10 reps  - Supine Ankle Inversion and Eversion AROM  - 2 x daily - 1 sets - 10 reps  - Seated Long Arc Quad  - 1 x daily - 1 sets - 10 reps  - Ankle and Toe Plantarflexion with Resistance  - 1 x daily - 1 sets - 10 reps         "

## 2024-08-07 ENCOUNTER — TELEPHONE (OUTPATIENT)
Dept: VASCULAR SURGERY | Facility: CLINIC | Age: 79
End: 2024-08-07

## 2024-08-07 ENCOUNTER — APPOINTMENT (OUTPATIENT)
Dept: RADIOLOGY | Facility: HOSPITAL | Age: 79
End: 2024-08-07
Payer: COMMERCIAL

## 2024-08-07 ENCOUNTER — HOSPITAL ENCOUNTER (OUTPATIENT)
Facility: HOSPITAL | Age: 79
Setting detail: OUTPATIENT SURGERY
Discharge: HOME | End: 2024-08-07
Attending: SURGERY | Admitting: SURGERY
Payer: COMMERCIAL

## 2024-08-07 ENCOUNTER — ANESTHESIA (OUTPATIENT)
Dept: PERIOP | Facility: HOSPITAL | Age: 79
End: 2024-08-07
Payer: COMMERCIAL

## 2024-08-07 VITALS
HEART RATE: 54 BPM | TEMPERATURE: 97.5 F | RESPIRATION RATE: 18 BRPM | DIASTOLIC BLOOD PRESSURE: 63 MMHG | SYSTOLIC BLOOD PRESSURE: 138 MMHG | WEIGHT: 233.47 LBS | OXYGEN SATURATION: 93 % | HEIGHT: 74 IN | BODY MASS INDEX: 29.96 KG/M2

## 2024-08-07 DIAGNOSIS — I73.9 PAD (PERIPHERAL ARTERY DISEASE) (HCC): ICD-10-CM

## 2024-08-07 LAB
GLUCOSE SERPL-MCNC: 107 MG/DL (ref 65–140)
GLUCOSE SERPL-MCNC: 115 MG/DL (ref 65–140)

## 2024-08-07 PROCEDURE — C1894 INTRO/SHEATH, NON-LASER: HCPCS | Performed by: SURGERY

## 2024-08-07 PROCEDURE — 82948 REAGENT STRIP/BLOOD GLUCOSE: CPT

## 2024-08-07 PROCEDURE — 76000 FLUOROSCOPY <1 HR PHYS/QHP: CPT

## 2024-08-07 PROCEDURE — C1760 CLOSURE DEV, VASC: HCPCS | Performed by: SURGERY

## 2024-08-07 PROCEDURE — C1769 GUIDE WIRE: HCPCS | Performed by: SURGERY

## 2024-08-07 PROCEDURE — C1887 CATHETER, GUIDING: HCPCS | Performed by: SURGERY

## 2024-08-07 PROCEDURE — 75710 ARTERY X-RAYS ARM/LEG: CPT | Performed by: SURGERY

## 2024-08-07 PROCEDURE — 36140 INTRO NDL ICATH UPR/LXTR ART: CPT | Performed by: SURGERY

## 2024-08-07 PROCEDURE — 76937 US GUIDE VASCULAR ACCESS: CPT | Performed by: SURGERY

## 2024-08-07 DEVICE — MYNX CONTROL VCD 5F
Type: IMPLANTABLE DEVICE | Site: GROIN | Status: FUNCTIONAL
Brand: MYNX CONTROL

## 2024-08-07 RX ORDER — HEPARIN SODIUM 200 [USP'U]/100ML
INJECTION, SOLUTION INTRAVENOUS
Status: COMPLETED | OUTPATIENT
Start: 2024-08-07 | End: 2024-08-07

## 2024-08-07 RX ORDER — ONDANSETRON 2 MG/ML
INJECTION INTRAMUSCULAR; INTRAVENOUS AS NEEDED
Status: DISCONTINUED | OUTPATIENT
Start: 2024-08-07 | End: 2024-08-07

## 2024-08-07 RX ORDER — CEFAZOLIN SODIUM 2 G/50ML
2000 SOLUTION INTRAVENOUS ONCE
Status: DISCONTINUED | OUTPATIENT
Start: 2024-08-07 | End: 2024-08-07

## 2024-08-07 RX ORDER — ONDANSETRON 2 MG/ML
4 INJECTION INTRAMUSCULAR; INTRAVENOUS EVERY 6 HOURS PRN
Status: DISCONTINUED | OUTPATIENT
Start: 2024-08-07 | End: 2024-08-07

## 2024-08-07 RX ORDER — SODIUM CHLORIDE 9 MG/ML
75 INJECTION, SOLUTION INTRAVENOUS CONTINUOUS
Status: DISPENSED | OUTPATIENT
Start: 2024-08-07 | End: 2024-08-07

## 2024-08-07 RX ORDER — ACETAMINOPHEN 325 MG/1
650 TABLET ORAL EVERY 6 HOURS PRN
Status: DISCONTINUED | OUTPATIENT
Start: 2024-08-07 | End: 2024-08-07 | Stop reason: HOSPADM

## 2024-08-07 RX ORDER — FENTANYL CITRATE 50 UG/ML
50 INJECTION, SOLUTION INTRAMUSCULAR; INTRAVENOUS
Status: DISCONTINUED | OUTPATIENT
Start: 2024-08-07 | End: 2024-08-07

## 2024-08-07 RX ORDER — SODIUM CHLORIDE 9 MG/ML
125 INJECTION, SOLUTION INTRAVENOUS CONTINUOUS
Status: DISCONTINUED | OUTPATIENT
Start: 2024-08-07 | End: 2024-08-07

## 2024-08-07 RX ORDER — GABAPENTIN 300 MG/1
600 CAPSULE ORAL 3 TIMES DAILY
Status: DISCONTINUED | OUTPATIENT
Start: 2024-08-07 | End: 2024-08-07 | Stop reason: HOSPADM

## 2024-08-07 RX ORDER — PROPOFOL 10 MG/ML
INJECTION, EMULSION INTRAVENOUS AS NEEDED
Status: DISCONTINUED | OUTPATIENT
Start: 2024-08-07 | End: 2024-08-07

## 2024-08-07 RX ORDER — FENTANYL CITRATE 50 UG/ML
INJECTION, SOLUTION INTRAMUSCULAR; INTRAVENOUS AS NEEDED
Status: DISCONTINUED | OUTPATIENT
Start: 2024-08-07 | End: 2024-08-07

## 2024-08-07 RX ORDER — GLYCOPYRROLATE 0.2 MG/ML
INJECTION INTRAMUSCULAR; INTRAVENOUS AS NEEDED
Status: DISCONTINUED | OUTPATIENT
Start: 2024-08-07 | End: 2024-08-07

## 2024-08-07 RX ORDER — SODIUM CHLORIDE, SODIUM LACTATE, POTASSIUM CHLORIDE, CALCIUM CHLORIDE 600; 310; 30; 20 MG/100ML; MG/100ML; MG/100ML; MG/100ML
INJECTION, SOLUTION INTRAVENOUS CONTINUOUS PRN
Status: DISCONTINUED | OUTPATIENT
Start: 2024-08-07 | End: 2024-08-07

## 2024-08-07 RX ORDER — LIDOCAINE HYDROCHLORIDE 20 MG/ML
INJECTION, SOLUTION EPIDURAL; INFILTRATION; INTRACAUDAL; PERINEURAL AS NEEDED
Status: DISCONTINUED | OUTPATIENT
Start: 2024-08-07 | End: 2024-08-07

## 2024-08-07 RX ORDER — MIDAZOLAM HYDROCHLORIDE 2 MG/2ML
INJECTION, SOLUTION INTRAMUSCULAR; INTRAVENOUS AS NEEDED
Status: DISCONTINUED | OUTPATIENT
Start: 2024-08-07 | End: 2024-08-07

## 2024-08-07 RX ORDER — IODIXANOL 320 MG/ML
INJECTION, SOLUTION INTRAVASCULAR AS NEEDED
Status: DISCONTINUED | OUTPATIENT
Start: 2024-08-07 | End: 2024-08-07 | Stop reason: HOSPADM

## 2024-08-07 RX ORDER — LIDOCAINE HYDROCHLORIDE 10 MG/ML
0.5 INJECTION, SOLUTION EPIDURAL; INFILTRATION; INTRACAUDAL; PERINEURAL ONCE AS NEEDED
Status: DISCONTINUED | OUTPATIENT
Start: 2024-08-07 | End: 2024-08-07

## 2024-08-07 RX ADMIN — SODIUM CHLORIDE: 0.9 INJECTION, SOLUTION INTRAVENOUS at 08:14

## 2024-08-07 RX ADMIN — SODIUM CHLORIDE 75 ML/HR: 0.9 INJECTION, SOLUTION INTRAVENOUS at 11:00

## 2024-08-07 RX ADMIN — PROPOFOL 150 MG: 10 INJECTION, EMULSION INTRAVENOUS at 08:20

## 2024-08-07 RX ADMIN — PHENYLEPHRINE HYDROCHLORIDE 200 MCG: 10 INJECTION INTRAVENOUS at 08:36

## 2024-08-07 RX ADMIN — ONDANSETRON 4 MG: 2 INJECTION INTRAMUSCULAR; INTRAVENOUS at 09:27

## 2024-08-07 RX ADMIN — MIDAZOLAM 2 MG: 1 INJECTION INTRAMUSCULAR; INTRAVENOUS at 08:12

## 2024-08-07 RX ADMIN — GLYCOPYRROLATE 0.2 MG: 0.2 INJECTION, SOLUTION INTRAMUSCULAR; INTRAVENOUS at 08:49

## 2024-08-07 RX ADMIN — GLYCOPYRROLATE 0.2 MG: 0.2 INJECTION, SOLUTION INTRAMUSCULAR; INTRAVENOUS at 08:46

## 2024-08-07 RX ADMIN — NICARDIPINE HYDROCHLORIDE 200 MCG: 2.5 INJECTION, SOLUTION INTRAVENOUS at 09:32

## 2024-08-07 RX ADMIN — PHENYLEPHRINE HYDROCHLORIDE 200 MCG: 10 INJECTION INTRAVENOUS at 08:20

## 2024-08-07 RX ADMIN — NOREPINEPHRINE BITARTRATE 2 MCG/MIN: 1 INJECTION, SOLUTION, CONCENTRATE INTRAVENOUS at 08:38

## 2024-08-07 RX ADMIN — FENTANYL CITRATE 50 MCG: 50 INJECTION INTRAMUSCULAR; INTRAVENOUS at 08:44

## 2024-08-07 RX ADMIN — LIDOCAINE HYDROCHLORIDE 100 MG: 20 INJECTION, SOLUTION EPIDURAL; INFILTRATION; INTRACAUDAL; PERINEURAL at 08:20

## 2024-08-07 RX ADMIN — SODIUM CHLORIDE 200 ML: 0.9 INJECTION, SOLUTION INTRAVENOUS at 06:32

## 2024-08-07 RX ADMIN — PROPOFOL 200 MCG/KG/MIN: 10 INJECTION, EMULSION INTRAVENOUS at 09:25

## 2024-08-07 RX ADMIN — GABAPENTIN 600 MG: 300 CAPSULE ORAL at 16:26

## 2024-08-07 RX ADMIN — SODIUM CHLORIDE, SODIUM LACTATE, POTASSIUM CHLORIDE, AND CALCIUM CHLORIDE: .6; .31; .03; .02 INJECTION, SOLUTION INTRAVENOUS at 08:52

## 2024-08-07 RX ADMIN — ACETAMINOPHEN 650 MG: 325 TABLET, FILM COATED ORAL at 16:26

## 2024-08-07 NOTE — DISCHARGE INSTR - AVS FIRST PAGE
DISCHARGE INSTRUCTIONS  ARTERIOGRAM/ANGIOPLASTY/STENT    ACTIVITY: On the evening following the procedure, you should be mostly resting.  Someone should remain with you during the evening and overnight following the procedure.     On the day after your procedure, limit your activity to walking.  Avoid heavy lifting (no more than 15 lbs) for the first three days. Walking up steps and normal activities may be resumed as you feel ready.   You should not drive a car for at least two days following discharge from the hospital. You may ride in a car.   If you have any questions regarding a particular activity, please discuss with your doctor or nurse before you are discharged.    DIET:  Resume your normal diet.  Drink more water than usual for the next 24 hours.    PROCEDURE SITE: You may have a procedure site in your groin, arm, or foot.  You may have surgical glue at your procedure site.  The glue is used to cover the procedure site, assist in closure, and prevent contamination. This adhesive will darken and peel away on its own within one to two weeks. Do not pick at it.    You should shower daily.  Wash incision daily with soap and water, but do not rub or scrub the incision; rinse thoroughly and pat dry.  Do not bathe in a tub or swim for the first 2 week following your procedure or if you have any open wounds.  It is normal to have some bruising, swelling or discoloration around the procedure site.  IF increasing redness, pain, or a bulge develops, call our office immediately.    If present, you may remove the band-aid or “steri-strips” over your procedure site after two days.   If you notice any active bleeding at the site, apply pressure to the site and call our office ([phone]) or [phone].    FOLLOW UP STUDIES:  Your doctor will discuss whether further treatments or follow-up studies are necessary at your first post procedure visit.    FOLLOW UP APPOINTMENTS:  Making and keeping follow up appointments and  ultrasound tests are important to your recovery.  If you have difficulty making it to or keeping your follow up appointments, call the office.    If you have increased pain, fever >101.5, increased drainage, redness or a bad smell at your surgery site, new coldness/numbness of your arm or leg, please call us immediately and GO directly to the ER.    PLEASE CALL THE OFFICE IF YOU HAVE ANY QUESTIONS  [phone]  [phone] [address], [address], [place] [address]  [address] [place] [address]  [address] [facility], [address], [place] [address]  [address] [facility], 1st Floor, [place] [address]  [address] [facility], [address], [place] [address]  [address] [facility], [address], [place] [address]  [address] [place] [address]  [address] [facility], 1st Floor, [address], [place] [address]  [address] [facility] [place] [address]  [address] [facility], [address], [place] [address]

## 2024-08-07 NOTE — TELEPHONE ENCOUNTER
----- Message from Vasquez Clark MD sent at 8/7/2024 11:55 AM EDT -----  Regarding: Bypass  Hi guys,    Mr. Horne will need a left lower extremity above knee popliteal to below knee popliteal artery bypass.  Can we find an OR date for him?  His daughter Whitley is his medical contact.  I spoke with her after today's angiogram to tell her this plan.    Let me know how I can help,  Dariel

## 2024-08-07 NOTE — OP NOTE
DATE OF PROCEDURE: 08/07/24    PERFORMING SERVICE: Vascular and Endovascular Surgery    ATTENDING SURGEON: Vasquez Clark MD    PREOPERATIVE DIAGNOSIS: Left lower extremity critical limb threatening ischemia with tissue loss    POSTOPERATIVE DIAGNOSIS: Same    PROCEDURE NAME:   Ultrasound guided access of the left common femoral artery  Aortogram with interpretation    ANESTHESIA: MAC with local injection    EBL: Minimal    UOP: 0, no gilbert    IVF: 200 cc    SPECIMENS: None     COMPLICATIONS: None    DRAINS: None    INDICATION: The patient is a pleasant 79-year-old diabetic male who presents with a healed right heel wound after bypass last year.  Unfortunately he has developed a left heel ulcer.  This was identified at his podiatrist's visit.  He underwent a lower extremity duplex evaluation which demonstrated his pressures are below healing potential and there is suspected femoral-popliteal disease.  In order to expedite his care we signed him up from the AP visit for my OR schedule to proceed with diagnostic and possibly interventional arteriography.  In the preop setting we discussed multiple options regarding the possible access ease and possible procedures.  The patient confirmed his operative consent.  In addition I spoke with his daughter Whitley and we described the procedure and plans.  They were both in agreement.    INTRAOPERATIVE FINDINGS:   Common femoral: Patent with surrounding calcific ring.  SFA: Patent to the distal segment with multiple collaterals.  Profunda: Patent profunda femoris with atherosclerotic burden proximally.  Popliteal: Complete occlusion of the popliteal artery reconstituting at the below-knee popliteal artery just prior to anterior tibial branching.  Tibials: Inline posterior tibial artery with severe stenosis at the distal segment.  Anterior tibial artery is also in line however has a distal coiled appearance.  The dorsalis pedis fills the foot and appears to be the  dominant runoff.  There is no discernible contributory peroneal artery.    ASSISTING SURGEON: Dr. Jos Henderson MD, Dr. Owen Corley MD,    DESCRIPTION OF PROCEDURE:   The patient was transported to the endovascular suite where a timeout was performed confirming the patient, procedure and laterality.  Preoperative antibiotics were administered.  The patient was prepped and draped in usual sterile surgical fashion.  A second timeout was again performed.  Sedation was administered.  Lower extremity DP and PT signals were minimal and monophasic.      The left groin was assessed via ultrasonography and a micropuncture kit was used to cannulate the common femoral artery artery.  This was completed in an antegrade fashion.  Fluoroscopy was used to confirm wire trajectory.  A microsheath was used to introduce a [employer] wire.  The microsheath was removed and replaced with a 5 Macedonian sheath.  An arteriogram demonstrated flow to the superficial femoral artery and profunda femoris.  The remaining left lower extremity arteriogram demonstrated the above-mentioned findings.      The decision was made to curtail endovascular attempts due to his dense and popliteal lesion.  At this time he appears to have a potential distal SFA/above-knee to below-knee popliteal artery bypass.  We decided to forego further endovascular attempts.  After 20 minutes of post closure digital pressure no hematoma was appreciated.    The patient tolerated the procedure well.  A total of 18 cc of contrast was used.  The patient's lower extremity vascular exam was intact upon completion of the case.      The patient was transported to the PACU in good condition.    Vasquez Clark MD    Vascular Quality Initiative - Peripheral Vascular Intervention     Urgency: Urgent    Functional Status:  Fully active; able to carry on all predisease activities without restriction.   Ambulation: Amb = independently ambulatory    Leg Symptoms    Right: Asymptomatic:   documented peripheral arterial disease without symptoms of claudication or ischemic pain        Left: Ulcer/necrosis (gangrene): de ernestine tissue loss due to peripheral arterial disease, not due to non-healing prior amputation       Tissue Loss Severity: Grade 1, Shallow = small shallow ulcer(s) on distal leg or foot, any exposed bone is only limited to distal phalanx (ie, minor tissue loss: limb salvage possible with simple digital amputation [1 or 2 digits], or skin coverage).     Infection: Grade 0, None = No symptoms or signs of infection.    COVID Information  COVID Symptoms Pre-Procedure: Asymptomatic    Treatment Delayed by Pandemic: None    Access   Number of Sites: 1     Access Site 1:     Side 1: Left    Site 1: Femoral Antegrade    Access Guidance 1:U/S    Largest Sheath Size 1: 5 Fr.    Closure Device 1: None   None    Procedure  Fluoro Time: 5.9 minutes  Contrast Volume: Visipaque 18 ml  DAP: 14.54 Gy.cm2  CO2: no  Anticoagulant: none  If Creatinine is > 1.2 or missing, SANJUANA Prophylaxis none     Treatment Details  Indication: Occlusive Disease,    Completion Assessment  Artery 1 treated: None                  Was this Site previously treated?: No          TASC Grade: C          Total Treated Length: 0 cm          Total Occluded Length: 14.3 cm          Calcification: Severe (calcification on both sides of artery > half length of lesion)          Number of Treatment types (Devices):   0          Concomitant: None          Technical result: No change.  Diagnostic study    None     Post Procedure  Procedure Complications: No      SIGNATURE: Vasquez Clark MD  DATE: August 7, 2024  TIME: 9:39 AM

## 2024-08-07 NOTE — INTERVAL H&P NOTE
H&P reviewed. After examining the patient I find no changes in the patients condition since the H&P had been written.    A: 78 y/o M w/ hx of tobacco use, DVT (on warfarin), HTN, HLD, DM, CKD3b, CAD s/p CABG, AAA s/p EVAR, HFpEF, and PAD s/p R fem-BK pop cryovein bypass w/ non-healing left foot wounds w/ LEADs showing FABIOLA 0.38 and GTP 28 w/ SFA occlusive disease.      P: Left lower extremity CO2 angiogram, possible intervention   Discussed risks, benefits, alternatives, and description of procedure and patient amenable to intervention as discussed.   Pre and post-op hydration     Vitals:    08/07/24 0620   BP: 145/66   Pulse: (!) 54   Resp: 16   Temp: 97.8 °F (36.6 °C)   SpO2: 96%

## 2024-08-07 NOTE — ANESTHESIA POSTPROCEDURE EVALUATION
Post-Op Assessment Note    CV Status:  Stable  Pain Score: 0    Pain management: adequate       Mental Status:  Alert and awake   Hydration Status:  Euvolemic   PONV Controlled:  Controlled   Airway Patency:  Patent and adequate     Post Op Vitals Reviewed: Yes    No anethesia notable event occurred.    Staff: Anesthesiologist, CRNA               BP   134/65   Temp   96.8   Pulse  71   Resp   18   SpO2   99%

## 2024-08-08 ENCOUNTER — APPOINTMENT (OUTPATIENT)
Dept: PHYSICAL THERAPY | Facility: CLINIC | Age: 79
End: 2024-08-08
Payer: COMMERCIAL

## 2024-08-08 ENCOUNTER — OFFICE VISIT (OUTPATIENT)
Age: 79
End: 2024-08-08
Payer: COMMERCIAL

## 2024-08-08 VITALS
RESPIRATION RATE: 18 BRPM | HEART RATE: 60 BPM | SYSTOLIC BLOOD PRESSURE: 140 MMHG | BODY MASS INDEX: 29.9 KG/M2 | HEIGHT: 74 IN | DIASTOLIC BLOOD PRESSURE: 65 MMHG | WEIGHT: 233 LBS

## 2024-08-08 DIAGNOSIS — E11.621 DIABETIC ULCER OF LEFT MIDFOOT ASSOCIATED WITH TYPE 2 DIABETES MELLITUS, LIMITED TO BREAKDOWN OF SKIN (HCC): ICD-10-CM

## 2024-08-08 DIAGNOSIS — E11.621 DIABETIC ULCER OF TOE OF LEFT FOOT ASSOCIATED WITH TYPE 2 DIABETES MELLITUS, LIMITED TO BREAKDOWN OF SKIN (HCC): Primary | ICD-10-CM

## 2024-08-08 DIAGNOSIS — I70.223 REST PAIN OF BOTH LOWER EXTREMITIES DUE TO ATHEROSCLEROSIS (HCC): ICD-10-CM

## 2024-08-08 DIAGNOSIS — L97.521 DIABETIC ULCER OF TOE OF LEFT FOOT ASSOCIATED WITH TYPE 2 DIABETES MELLITUS, LIMITED TO BREAKDOWN OF SKIN (HCC): Primary | ICD-10-CM

## 2024-08-08 DIAGNOSIS — E11.42 DIABETIC POLYNEUROPATHY ASSOCIATED WITH TYPE 2 DIABETES MELLITUS (HCC): ICD-10-CM

## 2024-08-08 DIAGNOSIS — L03.116 CELLULITIS OF LEFT FOOT: ICD-10-CM

## 2024-08-08 DIAGNOSIS — L97.421 DIABETIC ULCER OF LEFT MIDFOOT ASSOCIATED WITH TYPE 2 DIABETES MELLITUS, LIMITED TO BREAKDOWN OF SKIN (HCC): ICD-10-CM

## 2024-08-08 DIAGNOSIS — L97.421 DIABETIC ULCER OF LEFT HEEL ASSOCIATED WITH TYPE 2 DIABETES MELLITUS, LIMITED TO BREAKDOWN OF SKIN (HCC): ICD-10-CM

## 2024-08-08 DIAGNOSIS — E11.621 DIABETIC ULCER OF LEFT HEEL ASSOCIATED WITH TYPE 2 DIABETES MELLITUS, LIMITED TO BREAKDOWN OF SKIN (HCC): ICD-10-CM

## 2024-08-08 DIAGNOSIS — I70.209 PERIPHERAL ARTERIOSCLEROSIS (HCC): ICD-10-CM

## 2024-08-08 PROCEDURE — 99214 OFFICE O/P EST MOD 30 MIN: CPT | Performed by: PODIATRIST

## 2024-08-08 PROCEDURE — 87077 CULTURE AEROBIC IDENTIFY: CPT | Performed by: PODIATRIST

## 2024-08-08 PROCEDURE — 87205 SMEAR GRAM STAIN: CPT | Performed by: PODIATRIST

## 2024-08-08 PROCEDURE — 87186 SC STD MICRODIL/AGAR DIL: CPT | Performed by: PODIATRIST

## 2024-08-08 PROCEDURE — 87070 CULTURE OTHR SPECIMN AEROBIC: CPT | Performed by: PODIATRIST

## 2024-08-08 RX ORDER — OXYCODONE HYDROCHLORIDE AND ACETAMINOPHEN 5; 325 MG/1; MG/1
1 TABLET ORAL EVERY 8 HOURS PRN
Qty: 15 TABLET | Refills: 0 | Status: SHIPPED | OUTPATIENT
Start: 2024-08-08 | End: 2024-08-09 | Stop reason: SDUPTHER

## 2024-08-08 RX ORDER — DOXYCYCLINE 100 MG/1
100 CAPSULE ORAL 2 TIMES DAILY
Qty: 20 CAPSULE | Refills: 0 | Status: SHIPPED | OUTPATIENT
Start: 2024-08-08 | End: 2024-08-09 | Stop reason: SDUPTHER

## 2024-08-08 NOTE — PROGRESS NOTES
Assessment/Plan: Severe peripheral artery disease with rest pain in setting of diabetic patient.  Neuropathy.  Diabetic foot wound x 3 left foot.  Rest pain.  Diabetic neuropathy.    Plan.  Chart reviewed.  PCP notes reviewed.  Vascular surgery notes reviewed.  Patient seen at chair side.  At this time we will initiate wound care.  Culture and sensitivity was done.  Patient be started on empiric course of doxycycline.  He will bandage daily.  Patient has been dispensed an Aircast to help offload areas.  He is be referred to wound healing center.  Urgent referral to vascular surgery.  Patient is requesting pain medication for rest pain.  Percocet prescribed.  He will continue to take gabapentin and Trental as directed.  Return for follow-up.  Watch for signs of increasing infection.    Patient is extreme risk for BKA.    Patient advised to present to emergency room if pain increases.  In addition he will go to emergency room if he develops fever or night sweats.       Diagnoses and all orders for this visit:    Diabetic ulcer of toe of left foot associated with type 2 diabetes mellitus, limited to breakdown of skin (HCC)  -     Ambulatory Referral to Wound Care; Future    Diabetic ulcer of left midfoot associated with type 2 diabetes mellitus, limited to breakdown of skin (HCC)  -     Ambulatory Referral to Wound Care; Future    Diabetic ulcer of left heel associated with type 2 diabetes mellitus, limited to breakdown of skin (HCC)  -     Ambulatory Referral to Wound Care; Future    Diabetic polyneuropathy associated with type 2 diabetes mellitus (HCC)    Peripheral arteriosclerosis (HCC)    Rest pain of both lower extremities due to atherosclerosis (HCC)  -     Ambulatory referral to Vascular Surgery; Future  -     oxyCODONE-acetaminophen (PERCOCET) 5-325 mg per tablet; Take 1 tablet by mouth every 8 (eight) hours as needed for moderate pain for up to 5 days Max Daily Amount: 3 tablets    Cellulitis of left foot  -      doxycycline monohydrate (MONODOX) 100 mg capsule; Take 1 capsule (100 mg total) by mouth 2 (two) times a day for 10 days          Subjective: Patient presents status post failed IR procedure.  Patient is diabetic with severe peripheral artery disease.  He is now a candidate for bypass.  He is complaining of severe pain of his left leg.  This is relieved with dependency.  He has no history of fever or night sweats.  Allergies   Allergen Reactions    Lisinopril Rash and Lip Swelling         Current Outpatient Medications:     acetaminophen (TYLENOL) 325 mg tablet, Take 2 tablets (650 mg total) by mouth every 6 (six) hours as needed for mild pain, Disp: , Rfl: 0    amLODIPine (NORVASC) 10 mg tablet, Take 0.5 tablets (5 mg total) by mouth daily, Disp: 45 tablet, Rfl: 1    aspirin 81 mg chewable tablet, Chew 81 mg daily, Disp: , Rfl:     atorvastatin (LIPITOR) 40 mg tablet, Take 1 tablet (40 mg total) by mouth daily (Patient taking differently: Take 40 mg by mouth daily at bedtime), Disp: 90 tablet, Rfl: 1    Blood Glucose Monitoring Suppl (OneTouch Verio Reflect) w/Device KIT, Check blood sugars twice daily. Please substitute with appropriate alternative as covered by patient's insurance. Dx: E11.65, Disp: 1 kit, Rfl: 0    cloNIDine (CATAPRES) 0.1 mg tablet, take 1 tablet by mouth every 12 hours, Disp: 180 tablet, Rfl: 1    doxycycline monohydrate (MONODOX) 100 mg capsule, Take 1 capsule (100 mg total) by mouth 2 (two) times a day for 10 days, Disp: 20 capsule, Rfl: 0    Droplet Pen Needles 32G X 4 MM MISC, USE EVERY EVENING, Disp: 100 each, Rfl: 5    DULoxetine (Cymbalta) 30 mg delayed release capsule, Take 1 capsule (30 mg total) by mouth daily, Disp: 30 capsule, Rfl: 2    Empagliflozin (Jardiance) 25 MG TABS, TAKE 1 TABLET BY MOUTH DAILY, Disp: 90 tablet, Rfl: 1    fenofibrate 160 MG tablet, TAKE 1 TABLET BY MOUTH DAILY, Disp: 100 tablet, Rfl: 1    gabapentin (NEURONTIN) 600 MG tablet, TAKE 1 TABLET BY MOUTH 3  TIMES  DAILY, Disp: 100 tablet, Rfl: 1    glimepiride (AMARYL) 2 mg tablet, Take 1 tablet (2 mg total) by mouth 2 (two) times a day, Disp: 180 tablet, Rfl: 3    glucose blood (OneTouch Verio) test strip, TEST TWICE A DAY, Disp: 200 strip, Rfl: 5    insulin glargine (LANTUS) 100 units/mL subcutaneous injection, Inject 15 Units under the skin daily at bedtime, Disp: 10 mL, Rfl: 0    isosorbide mononitrate (IMDUR) 30 mg 24 hr tablet, Take 3 tablets (90 mg total) by mouth daily, Disp: 270 tablet, Rfl: 1    ketoconazole (NIZORAL) 2 % cream, Apply topically daily, Disp: 60 g, Rfl: 1    metFORMIN (GLUCOPHAGE) 500 mg tablet, Take 1 tablet (500 mg total) by mouth 2 (two) times a day with meals, Disp: 180 tablet, Rfl: 1    metoprolol tartrate (LOPRESSOR) 50 mg tablet, Take 0.5 tablets (25 mg total) by mouth every 12 (twelve) hours, Disp: 90 tablet, Rfl: 1    Multiple Vitamins-Minerals (MULTIVITAMIN MEN 50+ PO), Take by mouth daily, Disp: , Rfl:     Omega-3 Fatty Acids (fish oil) 1,000 mg, Take 4,000 mg by mouth 2 (two) times a day, Disp: , Rfl:     ondansetron (ZOFRAN) 4 mg tablet, Take 4 mg by mouth every 8 (eight) hours as needed for nausea or vomiting, Disp: , Rfl:     OneTouch Delica Lancets 33G MISC, Check blood sugars twice daily. Please substitute with appropriate alternative as covered by patient's insurance. Dx: E11.65, Disp: 200 each, Rfl: 3    oxyCODONE-acetaminophen (PERCOCET) 5-325 mg per tablet, Take 1 tablet by mouth every 8 (eight) hours as needed for moderate pain for up to 5 days Max Daily Amount: 3 tablets, Disp: 15 tablet, Rfl: 0    pantoprazole (PROTONIX) 40 mg tablet, Take 1 tablet (40 mg total) by mouth daily, Disp: 90 tablet, Rfl: 1    pentoxifylline (TRENtal) 400 mg ER tablet, Take 1 tablet (400 mg total) by mouth 3 (three) times a day with meals, Disp: 90 tablet, Rfl: 2    ranolazine (RANEXA) 1000 MG SR tablet, Take 1 tablet (1,000 mg total) by mouth 2 (two) times a day, Disp: 180 tablet, Rfl: 3     sitaGLIPtin (Januvia) 100 mg tablet, TAKE 1 TABLET BY MOUTH DAILY, Disp: 100 tablet, Rfl: 1    torsemide (DEMADEX) 20 mg tablet, TAKE 1 TABLET BY MOUTH DAILY, Disp: 90 tablet, Rfl: 1    warfarin (Coumadin) 2.5 mg tablet, Qd and as directed, Disp: 90 tablet, Rfl: 1    Elastic Bandages & Supports (Medical Compression Stockings) MISC, Use daily Knee High 15-20mmHg (Patient not taking: Reported on 2/1/2024), Disp: 2 each, Rfl: 4    ferrous sulfate 324 (65 Fe) mg, Take 1 tablet (324 mg total) by mouth 2 (two) times a day before meals (Patient taking differently: Take 324 mg by mouth Patient takes every other day), Disp: 180 tablet, Rfl: 1    nitroglycerin (NITROSTAT) 0.4 mg SL tablet, Place 1 tablet (0.4 mg total) under the tongue every 5 (five) minutes as needed for chest pain, Disp: 30 tablet, Rfl: 0    warfarin (COUMADIN) 1 mg tablet, take 1 tablet by mouth once daily, Disp: 90 tablet, Rfl: 0    warfarin (COUMADIN) 3 mg tablet, take 1 tablet by mouth once daily, Disp: 90 tablet, Rfl: 0  No current facility-administered medications for this visit.    Patient Active Problem List   Diagnosis    Mixed hyperlipidemia    Primary hypertension    Coronary artery disease involving native coronary artery of native heart without angina pectoris    S/P angioplasty with stent    Hx of CABG    S/P AAA repair    S/P prostatectomy    H/O prostate cancer    Type 2 diabetes mellitus with diabetic polyneuropathy, with long-term current use of insulin ([facility])    Varicose veins of left lower extremity    History of endovascular stent graft for abdominal aortic aneurysm (AAA)    Bruit (arterial)    Peripheral artery disease (HCC)    Type 2 diabetes mellitus with diabetic peripheral angiopathy without gangrene, with long-term current use of insulin (HCC)    Actinic keratoses    Acute kidney injury superimposed on chronic kidney disease  (HCC)    Platelets decreased (HCC)    Gross hematuria    Chronic HFpEF/Moderate pHTN (HFpEF) (HCC)    Mild  aortic stenosis    Chronic kidney disease-mineral and bone disorder    Stable angina pectoris    Hypertensive urgency    Elevated troponin level not due myocardial infarction    Diabetic ulcer of right midfoot associated with diabetes mellitus due to underlying condition, limited to breakdown of skin (HCC)    Acute on chronic diastolic heart failure (HCC)    Stage 3b chronic kidney disease (HCC)    Frequent PVCs    Acute respiratory distress    Melena    Symptomatic anemia    Multiple gastric ulcers    Iron deficiency anemia due to chronic blood loss    Duodenal ulcer    Acute blood loss anemia    History of colon polyps    Shortness of breath    Iron deficiency anemia    Acute deep vein thrombosis (DVT) of left peroneal vein (HCC)    Plantar fasciitis, right          Patient ID: Thomas Horne is a 79 y.o. male.    HPI    The following portions of the patient's history were reviewed and updated as appropriate:     family history includes Alcohol abuse in his father; Cancer in his brother, brother, sister, and sister; Diabetes in his mother and sister; Heart disease in his brother.      reports that he quit smoking about 36 years ago. His smoking use included cigarettes. He started smoking about 68 years ago. He has a 49.5 pack-year smoking history. He has been exposed to tobacco smoke. He has never used smokeless tobacco. He reports current alcohol use of about 10.0 standard drinks of alcohol per week. He reports that he does not use drugs.    Vitals:    08/08/24 1326   BP: 140/65   Pulse: 60   Resp: 18       Review of Systems      Objective:  Patient's shoes and socks removed.   Foot ExamPhysical Exam         Right Foot/Ankle      Inspection and Palpation  Skin Exam: abnormal color and erythema;         Left Foot/Ankle       Inspection and Palpation  Skin Exam: abnormal color and erythema;         Physical Exam  Cardiovascular:      Pulses: Pulses are weak.   Feet:      Right foot:      Skin integrity: Erythema  present.      Left foot:      Skin integrity: Erythema present.               General  General Appearance: appears stated age and healthy   Orientation: alert and oriented to person, place, and time   Affect: appropriate   Gait: antalgic         Right Foot/Ankle      Inspection and Palpation  Tenderness: metatarsals   Swelling: none   Arch: pes planus  Claw Toes: fifth toe  Hallux limitus: yes  Skin Exam: callus, dry skin and tinea;      Neurovascular  Dorsalis pedis: 1+  Posterior tibial: 1+  Saphenous nerve sensation: absent  Tibial nerve sensation: absent  Superficial peroneal nerve sensation: absent  Deep peroneal nerve sensation: absent  Sural nerve sensation: absent        Left Foot/Ankle       Inspection and Palpation  Tenderness: metatarsals   Swelling: none   Arch: pes planus  Claw toes: second toe and fifth toe  Hallux limitus: yes  Skin Exam: callus, dry skin and tinea;      Neurovascular  Dorsalis pedis: 1+  Posterior tibial: 1+  Saphenous nerve sensation: absent  Tibial nerve sensation: absent  Superficial peroneal nerve sensation: absent  Deep peroneal nerve sensation: absent  Sural nerve sensation: absent           Physical Exam  Vitals signs and nursing note reviewed.   Cardiovascular:      Rate and Rhythm: Normal rate and regular rhythm.      Pulses:           Dorsalis pedis pulses are 1+ on the right side and 1+ on the left side.        Posterior tibial pulses are 1+ on the right side and 1+ on the left side.   Feet:      Right foot:      Skin integrity: Callus and dry skin present.      Left foot:      Skin integrity: Callus and dry skin present.   Skin:     Capillary Refill: Capillary refill takes 2 to 3 seconds.      Comments:   All nails are thick and mycotic.  Patient demonstrates bilateral dermatophytosis.  There is a Sumner grade 1 ulcer distal aspect second left toe.  Negative cellulitis.  Plantar aspect left heel demonstrates 1.5 cm grade Sumner grade 1 ulcer with eschar.  Positive  surrounding cellulitis.  Negative abscess or sinus.  There is also a 0.5 cm² Sumner grade 1 lateral aspect fifth metatarsal base.  Negative cellulitis.  Right Foot/Ankle   Right Foot Inspection  Skin Exam: dry skin, callus and callus                                 Vascular     The right DP pulse is 1+. The right PT pulse is 1+.   Right Toe  - Comprehensive Exam  Arch: pes planus  Claw Toes: fifth toe  Hallux limitus: yes  Swelling: none   Tenderness: metatarsals            Left Foot/Ankle  Left Foot Inspection  Skin Exam: dry skin and callus                                             Vascular     The left DP pulse is 1+. The left PT pulse is 1+.   Left Toe  - Comprehensive Exam  Arch: pes planus  Claw toes: second toe and fifth toe  Hallux limitus: yes  Swelling: none   Tenderness: metatarsals.     Patient's shoes and socks removed.     Right Foot/Ankle   Right Foot Inspection  Skin Exam: erythema and abnormal color.      Toe Exam: tenderness and right toe deformity.      Sensory   Vibration: diminished  Proprioception: diminished  Monofilament testing: diminished     Vascular  Capillary refills: < 3 seconds        Left Foot/Ankle  Left Foot Inspection  Skin Exam: erythema and abnormal color.      Toe Exam: tenderness and left toe deformity.      Sensory   Vibration: diminished  Proprioception: diminished  Monofilament testing: diminished     Vascular  Capillary refills: < 3 seconds        Assign Risk Category  Deformity present  Loss of protective sensation  Weak pulses  Risk: 2

## 2024-08-09 ENCOUNTER — PREP FOR PROCEDURE (OUTPATIENT)
Dept: VASCULAR SURGERY | Facility: CLINIC | Age: 79
End: 2024-08-09

## 2024-08-09 DIAGNOSIS — L03.116 CELLULITIS OF LEFT FOOT: ICD-10-CM

## 2024-08-09 DIAGNOSIS — I70.223 REST PAIN OF BOTH LOWER EXTREMITIES DUE TO ATHEROSCLEROSIS (HCC): ICD-10-CM

## 2024-08-09 DIAGNOSIS — I70.209 PERIPHERAL ARTERIOSCLEROSIS (HCC): Primary | ICD-10-CM

## 2024-08-09 RX ORDER — OXYCODONE AND ACETAMINOPHEN 5; 325 MG/1; MG/1
1 TABLET ORAL EVERY 8 HOURS PRN
Qty: 15 TABLET | Refills: 0 | OUTPATIENT
Start: 2024-08-09 | End: 2024-08-14

## 2024-08-09 RX ORDER — OXYCODONE AND ACETAMINOPHEN 5; 325 MG/1; MG/1
1 TABLET ORAL EVERY 8 HOURS PRN
Qty: 15 TABLET | Refills: 0 | Status: CANCELLED | OUTPATIENT
Start: 2024-08-09 | End: 2024-08-14

## 2024-08-09 RX ORDER — DOXYCYCLINE 100 MG/1
100 CAPSULE ORAL 2 TIMES DAILY
Qty: 20 CAPSULE | Refills: 0 | Status: CANCELLED | OUTPATIENT
Start: 2024-08-09 | End: 2024-08-19

## 2024-08-09 RX ORDER — DOXYCYCLINE 100 MG/1
100 CAPSULE ORAL 2 TIMES DAILY
Qty: 20 CAPSULE | Refills: 0 | OUTPATIENT
Start: 2024-08-09 | End: 2024-08-19

## 2024-08-09 RX ORDER — DOXYCYCLINE 100 MG/1
100 CAPSULE ORAL 2 TIMES DAILY
Qty: 20 CAPSULE | Refills: 0 | Status: SHIPPED | OUTPATIENT
Start: 2024-08-09 | End: 2024-08-19

## 2024-08-09 RX ORDER — OXYCODONE HYDROCHLORIDE AND ACETAMINOPHEN 5; 325 MG/1; MG/1
1 TABLET ORAL EVERY 8 HOURS PRN
Qty: 15 TABLET | Refills: 0 | Status: SHIPPED | OUTPATIENT
Start: 2024-08-09 | End: 2024-08-14

## 2024-08-09 NOTE — TELEPHONE ENCOUNTER
Verified patient's insurance   CONFIRMED - Patient's insurance is [employer]  REP  Is patient requesting a call when authorization has been obtained? Patient did not request a call.    Surgery Date: 9/17/24  Primary Surgeon: CAP // Vasquez Clark (NPI: [id number])  Assisting Surgeon: Not Applicable (N/A)  Facility: [facility] (Tax: [id number] / NPI: [id number])  Inpatient / Outpatient: Inpatient  Level: 3    Clearance Received: No clearance ordered.  Consent Received: Consent will be signed day of procedure.  Medication Hold / Last Dose:  Hold Coumadin - 5 days prior, last dose 9/11/24  IR Notified: Not Applicable (N/A)  Rep. Notified: Not Applicable (N/A)  Equipment Needs: Not Applicable (N/A)  Vas Lab Requested: Not Applicable (N/A)  Patient Contacted: 8/9/24 - spoke to sydnee Becker    Diagnosis: I70.209  Procedure/ CPT Code(s): BYPASS - Femoral-Popliteal // CPT: 76543    For varicose vein related procedures:   Last LEVDR: Not Applicable (N/A)  CEAP Classification: Not Applicable (N/A)  VCSS: Not Applicable (N/A)    Post Operative Date/ Time: 10/3/24 , 230p [place] with Vasquez Clark (NPI: [id number])     *Please review medication hold(s), PATs, and check H&P with patient.*  PATIENT WAS MAILED SURGERY/SHOWERING/DISCHARGE/COVID INSTRUCTIONS AFTER REVIEWING WITH THEM VIA PHONE CALL.

## 2024-08-09 NOTE — TELEPHONE ENCOUNTER
Refill must be reviewed and completed by the office or provider. The refill is unable to be approved or denied by the medication management team.    Scripts cannot be delegated   NOT A DUPLICATE NEEDS TO BE SENT TO LOCAL PHARMACY. SCRIPTS WERE REFUSED YESTERDAY. PLEASE DO NOT REFUSE NEEDS TO BE SENT TO LOCAL

## 2024-08-09 NOTE — TELEPHONE ENCOUNTER
Prescription were sent to incorrect pharmacy please resend to correct Pharmacy.  2 med are percocet and Doxycycline needs medication today has infection on foot please send prescription to     [id number] - [place] -   [address] [facility] [phone]

## 2024-08-09 NOTE — TELEPHONE ENCOUNTER
NOT A DUPLICATE NEEDS TO BE SENT TO LOCAL PHARMACY. SCRIPTS WERE REFUSED YESTERDAY. PLEASE DO NOT REFUSE NEEDS TO BE SENT TO LOCAL   Reason for call:   [x] Refill   [] Prior Auth  [] Other:     Office:   [] PCP/Provider -   [x] Specialty/Provider -     Medication:       Pharmacy: [id number] - [place] - [address] [place] [address] [[id number]]     Does the patient have enough for 3 days?   [] Yes   [x] No - Send as HP to POD

## 2024-08-11 LAB
BACTERIA WND AEROBE CULT: ABNORMAL
GRAM STN SPEC: ABNORMAL

## 2024-08-12 ENCOUNTER — APPOINTMENT (OUTPATIENT)
Dept: PHYSICAL THERAPY | Facility: CLINIC | Age: 79
End: 2024-08-12
Payer: COMMERCIAL

## 2024-08-12 NOTE — PROGRESS NOTES
8/12/2024: Pt sent internal message to cancel remaining appointments; appts removed. Current podiatrist suspects LE pain is due to dysvascular foot vs plantar fascitis. Pt is scheduled for surgery 9/17/2024 to address bilateral LE pain. Pt is welcome to return when medically appropriate. Current episode of care to be discharged at this time.     Michell Szymanski, PT, DPT

## 2024-08-13 ENCOUNTER — CONSULT (OUTPATIENT)
Age: 79
End: 2024-08-13
Payer: COMMERCIAL

## 2024-08-13 VITALS
HEART RATE: 60 BPM | HEIGHT: 74 IN | DIASTOLIC BLOOD PRESSURE: 66 MMHG | SYSTOLIC BLOOD PRESSURE: 134 MMHG | BODY MASS INDEX: 30.01 KG/M2 | WEIGHT: 233.8 LBS

## 2024-08-13 DIAGNOSIS — E11.42 DIABETIC POLYNEUROPATHY ASSOCIATED WITH TYPE 2 DIABETES MELLITUS (HCC): Primary | ICD-10-CM

## 2024-08-13 DIAGNOSIS — M77.41 METATARSALGIA OF BOTH FEET: ICD-10-CM

## 2024-08-13 DIAGNOSIS — M77.42 METATARSALGIA OF BOTH FEET: ICD-10-CM

## 2024-08-13 DIAGNOSIS — M54.16 RADICULOPATHY OF LUMBAR REGION: ICD-10-CM

## 2024-08-13 PROCEDURE — 99204 OFFICE O/P NEW MOD 45 MIN: CPT | Performed by: STUDENT IN AN ORGANIZED HEALTH CARE EDUCATION/TRAINING PROGRAM

## 2024-08-13 RX ORDER — PREGABALIN 100 MG/1
100 CAPSULE ORAL 3 TIMES DAILY
Qty: 90 CAPSULE | Refills: 0 | Status: SHIPPED | OUTPATIENT
Start: 2024-08-13 | End: 2024-09-12

## 2024-08-13 NOTE — PROGRESS NOTES
Assessment:  1. Metatarsalgia of both feet    2. Radiculopathy of lumbar region    3. Diabetic polyneuropathy associated with type 2 diabetes mellitus (HCC)        Plan:  Patient is a 79-year-old male with past medical history of HTN, CAD, DM, Peripheral Neuropathy who presents today secondary to bilateral foot pain.  Patient states most of the pain is severe at the heels of the foot. He states it is severe in nature.  He rates the pain 5 out of 10 at this time.  He states the pain can be as high as a 10 out of 10 when he is ambulating.  He states the pain is there intermittently.  He denies any typical pattern.  He describes the pain as pressure-like and as sharp pain as well.  Patient does use a cane to ambulate.  He states the pain is worse with sitting, bending and walking.  He states the pain is better with laying down and sitting.  Patient has tried physical therapy in the past with no relief.  Patient has also tried neuropathic medications such as gabapentin, and duloxetine with minimal relief.     At this time we will      He doesn't seem to having lumbar radicular pain on today's exam.   Discussed with patient to trial lyrica 100mg TID. Patient will stop the gabapentin and if no benefits will go back to gabapentin 600 mg.   No intervention at this time as he has open wounds at this time. Patient might benefit from spinal cord stimulator in the future.       My impressions and treatment recommendations were discussed in detail with the patient, who verbalized understanding and had no further questions.    Follow-up is planned in 4-6 weeks time or sooner as warranted.  Discharge instructions were provided. I personally saw and examined the patient and I agree with the above discussed plan of care.    History of Present Illness:    Thomas Horne is a 79 y.o. male who presents to [facility] for initial evaluation of the above stated pain complaints. The patient has a past medical and  chronic pain history as outlined in the assessment section. Patient states most of the pain is severe at the heels of the foot. He states it is severe in nature.  He rates the pain 5 out of 10 at this time.  He states the pain can be as high as a 10 out of 10 when he is ambulating.  He states the pain is there intermittently.  He denies any typical pattern.  He describes the pain as pressure-like and as sharp pain as well.  Patient does use a cane to ambulate.  He states the pain is worse with sitting, bending and walking.  He states the pain is better with laying down and sitting.  Patient has tried physical therapy in the past with no relief.  Patient has also tried neuropathic medications such as gabapentin, and duloxetine with minimal relief.       He was referred by Sundar Arango DPM  [address] [place] [id number].     Review of Systems:    Review of Systems   Respiratory:  Negative for shortness of breath.    Cardiovascular:  Positive for leg swelling. Negative for chest pain.   Gastrointestinal:  Negative for constipation, diarrhea, nausea and vomiting.   Musculoskeletal:  Negative for arthralgias, gait problem, joint swelling and myalgias.   Skin:  Positive for wound. Negative for rash.   Neurological:  Negative for dizziness, seizures and weakness.   All other systems reviewed and are negative.          Past Medical History:   Diagnosis Date    Anemia     CAD (coronary artery disease)     Callus     Cancer (HCC)     prostate    CHF (congestive heart failure) (HCC)     Chronic kidney disease     Clotting disorder (HCC)     Coronary artery disease 1988    Deep vein thrombosis (HCC)     Diabetes mellitus (HCC)     Difficulty walking     Duodenal ulcer     Hyperlipidemia     Hypertension     Myocardial infarction (HCC)     Neuropathy     Bilateral feet    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)     Plantar fasciitis     Sleep apnea     Could not tolerate CPAP       Past Surgical History:   Procedure  Laterality Date    ABDOMINAL AORTIC ANEURYSM REPAIR      Stented    ADENOIDECTOMY      CARDIAC CATHETERIZATION Left 10/19/2022    Procedure: Cardiac Left Heart Cath;  Surgeon: Danielle Pereira MD;  Location: [facility];  Service: Cardiology    CARDIAC SURGERY  2002    3 cardiac bypass then angioplasty 2020    CHOLECYSTECTOMY      COLONOSCOPY  2024    CORONARY ARTERY BYPASS GRAFT      IR LOWER EXTREMITY ANGIOGRAM  2023    IR LOWER EXTREMITY ANGIOGRAM  2024    IN BYPASS W/VEIN FEMORAL-POPLITEAL Right 2023    Procedure: BYPASS FEMORAL-POPLITEAL WITH CRYO VEIN, RIGHT FEMORAL ENDARTERECTOMY;  Surgeon: Vasquez Clark MD;  Location: [facility];  Service: Vascular    IN SLCTV CATHJ 3RD+ ORD SLCTV ABDL PEL/LXTR BRNCH Right 2023    Procedure: ARTERIOGRAM Right lower extremity arteriogram with CO2 via right groin access;  Surgeon: Vasquez Clark MD;  Location: [facility];  Service: Vascular    IN SLCTV CATHJ 3RD+ ORD SLCTV ABDL PEL/LXTR BRNCH Left 2024    Procedure: diagnostic LLE Arteriogram;  Surgeon: Vasquez Clark MD;  Location: [facility];  Service: Vascular    PROSTATE SURGERY      TONSILLECTOMY      URINARY SPHINCTER IMPLANT         Family History   Problem Relation Age of Onset    Diabetes Mother     Alcohol abuse Father     Heart disease Brother     Cancer Sister         Thyroid    Cancer Sister         Colon    Cancer Brother         Throat    Cancer Brother     Diabetes Sister     Mental illness Neg Hx        Social History     Occupational History    Not on file   Tobacco Use    Smoking status: Former     Current packs/day: 0.00     Average packs/day: 1.5 packs/day for 33.0 years (49.5 ttl pk-yrs)     Types: Cigarettes     Start date:      Quit date:      Years since quittin.6     Passive exposure: Past    Smokeless tobacco: Never   Vaping Use    Vaping status: Never Used   Substance and Sexual Activity    Alcohol use:  Yes     Alcohol/week: 10.0 standard drinks of alcohol     Types: 10 Cans of beer per week    Drug use: Never    Sexual activity: Not Currently     Partners: Female     Birth control/protection: Male Sterilization         Current Outpatient Medications:     acetaminophen (TYLENOL) 325 mg tablet, Take 2 tablets (650 mg total) by mouth every 6 (six) hours as needed for mild pain, Disp: , Rfl: 0    amLODIPine (NORVASC) 10 mg tablet, Take 0.5 tablets (5 mg total) by mouth daily, Disp: 45 tablet, Rfl: 1    aspirin 81 mg chewable tablet, Chew 81 mg daily, Disp: , Rfl:     atorvastatin (LIPITOR) 40 mg tablet, Take 1 tablet (40 mg total) by mouth daily (Patient taking differently: Take 40 mg by mouth daily at bedtime), Disp: 90 tablet, Rfl: 1    Blood Glucose Monitoring Suppl (OneTouch Verio Reflect) w/Device KIT, Check blood sugars twice daily. Please substitute with appropriate alternative as covered by patient's insurance. Dx: E11.65, Disp: 1 kit, Rfl: 0    cloNIDine (CATAPRES) 0.1 mg tablet, take 1 tablet by mouth every 12 hours, Disp: 180 tablet, Rfl: 1    doxycycline monohydrate (MONODOX) 100 mg capsule, Take 1 capsule (100 mg total) by mouth 2 (two) times a day for 10 days, Disp: 20 capsule, Rfl: 0    Droplet Pen Needles 32G X 4 MM MISC, USE EVERY EVENING, Disp: 100 each, Rfl: 5    DULoxetine (Cymbalta) 30 mg delayed release capsule, Take 1 capsule (30 mg total) by mouth daily, Disp: 30 capsule, Rfl: 2    Elastic Bandages & Supports (Medical Compression Stockings) MISC, Use daily Knee High 15-20mmHg (Patient not taking: Reported on 2/1/2024), Disp: 2 each, Rfl: 4    Empagliflozin (Jardiance) 25 MG TABS, TAKE 1 TABLET BY MOUTH DAILY, Disp: 90 tablet, Rfl: 1    fenofibrate 160 MG tablet, TAKE 1 TABLET BY MOUTH DAILY, Disp: 100 tablet, Rfl: 1    ferrous sulfate 324 (65 Fe) mg, Take 1 tablet (324 mg total) by mouth 2 (two) times a day before meals (Patient taking differently: Take 324 mg by mouth Patient takes every other  day), Disp: 180 tablet, Rfl: 1    gabapentin (NEURONTIN) 600 MG tablet, TAKE 1 TABLET BY MOUTH 3 TIMES  DAILY, Disp: 100 tablet, Rfl: 1    glimepiride (AMARYL) 2 mg tablet, Take 1 tablet (2 mg total) by mouth 2 (two) times a day, Disp: 180 tablet, Rfl: 3    glucose blood (OneTouch Verio) test strip, TEST TWICE A DAY, Disp: 200 strip, Rfl: 5    insulin glargine (LANTUS) 100 units/mL subcutaneous injection, Inject 15 Units under the skin daily at bedtime, Disp: 10 mL, Rfl: 0    isosorbide mononitrate (IMDUR) 30 mg 24 hr tablet, Take 3 tablets (90 mg total) by mouth daily, Disp: 270 tablet, Rfl: 1    ketoconazole (NIZORAL) 2 % cream, Apply topically daily, Disp: 60 g, Rfl: 1    metFORMIN (GLUCOPHAGE) 500 mg tablet, Take 1 tablet (500 mg total) by mouth 2 (two) times a day with meals, Disp: 180 tablet, Rfl: 1    metoprolol tartrate (LOPRESSOR) 50 mg tablet, Take 0.5 tablets (25 mg total) by mouth every 12 (twelve) hours, Disp: 90 tablet, Rfl: 1    Multiple Vitamins-Minerals (MULTIVITAMIN MEN 50+ PO), Take by mouth daily, Disp: , Rfl:     nitroglycerin (NITROSTAT) 0.4 mg SL tablet, Place 1 tablet (0.4 mg total) under the tongue every 5 (five) minutes as needed for chest pain, Disp: 30 tablet, Rfl: 0    Omega-3 Fatty Acids (fish oil) 1,000 mg, Take 4,000 mg by mouth 2 (two) times a day, Disp: , Rfl:     ondansetron (ZOFRAN) 4 mg tablet, Take 4 mg by mouth every 8 (eight) hours as needed for nausea or vomiting, Disp: , Rfl:     OneTouch Delica Lancets 33G MISC, Check blood sugars twice daily. Please substitute with appropriate alternative as covered by patient's insurance. Dx: E11.65, Disp: 200 each, Rfl: 3    oxyCODONE-acetaminophen (PERCOCET) 5-325 mg per tablet, Take 1 tablet by mouth every 8 (eight) hours as needed for moderate pain for up to 5 days Max Daily Amount: 3 tablets, Disp: 15 tablet, Rfl: 0    pantoprazole (PROTONIX) 40 mg tablet, Take 1 tablet (40 mg total) by mouth daily, Disp: 90 tablet, Rfl: 1     pentoxifylline (TRENtal) 400 mg ER tablet, Take 1 tablet (400 mg total) by mouth 3 (three) times a day with meals, Disp: 90 tablet, Rfl: 2    ranolazine (RANEXA) 1000 MG SR tablet, Take 1 tablet (1,000 mg total) by mouth 2 (two) times a day, Disp: 180 tablet, Rfl: 3    sitaGLIPtin (Januvia) 100 mg tablet, TAKE 1 TABLET BY MOUTH DAILY, Disp: 100 tablet, Rfl: 1    torsemide (DEMADEX) 20 mg tablet, TAKE 1 TABLET BY MOUTH DAILY, Disp: 90 tablet, Rfl: 1    warfarin (COUMADIN) 1 mg tablet, take 1 tablet by mouth once daily, Disp: 90 tablet, Rfl: 0    warfarin (Coumadin) 2.5 mg tablet, Qd and as directed, Disp: 90 tablet, Rfl: 1    warfarin (COUMADIN) 3 mg tablet, take 1 tablet by mouth once daily, Disp: 90 tablet, Rfl: 0    Allergies   Allergen Reactions    Lisinopril Rash and Lip Swelling       Physical Exam:    There were no vitals taken for this visit.    Constitutional: normal, well developed, well nourished, alert, in no distress and non-toxic and no overt pain behavior.  Eyes: anicteric  HEENT: grossly intact  Neck: supple, symmetric, trachea midline and no masses   Pulmonary:even and unlabored  Cardiovascular:No edema or pitting edema present  Skin:Normal without rashes or lesions and well hydrated  Psychiatric:Mood and affect appropriate  Neurologic:Cranial Nerves II-XII grossly intact  Musculoskeletal:normal and antalgic  Foot: ulcer noted on the right foot. Decrease sensation bilateral dorsum and plantar foot.   Lumbar : Non-tender to touch. Negative Straight leg bilaterally. No pain with Active or passive ROM of the lumbar spine.     Imaging  Narrative & Impression   LUMBAR SPINE     INDICATION:   M54.16: Radiculopathy, lumbar region.     COMPARISON:  None     VIEWS:  XR SPINE LUMBAR COMPLETE W BENDING MINIMUM 6 VIEWS  Images: 7     FINDINGS:     There are 5 non rib bearing lumbar vertebral bodies.      There is no evidence of acute fracture or destructive osseous lesion.     Mild scoliotic deformity is noted.   Alignment is otherwise unremarkable.      Multilevel spondylosis and endplate sclerosis with disc space narrowing at the L5-S1 level noted.     The pedicles appear intact.     Aortobiiliac stent graft identified.     IMPRESSION:     No acute osseous abnormality.       Degenerative changes as described.        Workstation performed: [id number]       No orders of the defined types were placed in this encounter.

## 2024-08-14 ENCOUNTER — APPOINTMENT (OUTPATIENT)
Dept: PHYSICAL THERAPY | Facility: CLINIC | Age: 79
End: 2024-08-14
Payer: COMMERCIAL

## 2024-08-15 ENCOUNTER — OFFICE VISIT (OUTPATIENT)
Dept: WOUND CARE | Facility: HOSPITAL | Age: 79
End: 2024-08-15
Payer: COMMERCIAL

## 2024-08-15 VITALS
HEART RATE: 69 BPM | RESPIRATION RATE: 18 BRPM | BODY MASS INDEX: 29.13 KG/M2 | HEIGHT: 74 IN | SYSTOLIC BLOOD PRESSURE: 148 MMHG | DIASTOLIC BLOOD PRESSURE: 87 MMHG | TEMPERATURE: 97.1 F | WEIGHT: 227 LBS

## 2024-08-15 DIAGNOSIS — E11.621 DIABETIC ULCER OF LEFT FOOT ASSOCIATED WITH TYPE 2 DIABETES MELLITUS, WITH FAT LAYER EXPOSED, UNSPECIFIED PART OF FOOT (HCC): Primary | ICD-10-CM

## 2024-08-15 DIAGNOSIS — L97.522 DIABETIC ULCER OF LEFT FOOT ASSOCIATED WITH TYPE 2 DIABETES MELLITUS, WITH FAT LAYER EXPOSED, UNSPECIFIED PART OF FOOT (HCC): Primary | ICD-10-CM

## 2024-08-15 DIAGNOSIS — Z79.4 TYPE 2 DIABETES MELLITUS WITH DIABETIC PERIPHERAL ANGIOPATHY WITHOUT GANGRENE, WITH LONG-TERM CURRENT USE OF INSULIN (HCC): ICD-10-CM

## 2024-08-15 DIAGNOSIS — E11.51 TYPE 2 DIABETES MELLITUS WITH DIABETIC PERIPHERAL ANGIOPATHY WITHOUT GANGRENE, WITH LONG-TERM CURRENT USE OF INSULIN (HCC): ICD-10-CM

## 2024-08-15 DIAGNOSIS — I73.9 PERIPHERAL ARTERY DISEASE (HCC): ICD-10-CM

## 2024-08-15 PROCEDURE — 99214 OFFICE O/P EST MOD 30 MIN: CPT | Performed by: STUDENT IN AN ORGANIZED HEALTH CARE EDUCATION/TRAINING PROGRAM

## 2024-08-15 PROCEDURE — 97597 DBRDMT OPN WND 1ST 20 CM/<: CPT | Performed by: STUDENT IN AN ORGANIZED HEALTH CARE EDUCATION/TRAINING PROGRAM

## 2024-08-15 RX ORDER — SODIUM HYPOCHLORITE 1.25 MG/ML
1 SOLUTION TOPICAL DAILY
Qty: 473 ML | Refills: 1 | Status: SHIPPED | OUTPATIENT
Start: 2024-08-15

## 2024-08-15 RX ORDER — LIDOCAINE 40 MG/G
CREAM TOPICAL ONCE
Status: COMPLETED | OUTPATIENT
Start: 2024-08-15 | End: 2024-08-15

## 2024-08-15 RX ADMIN — LIDOCAINE: 40 CREAM TOPICAL at 09:51

## 2024-08-15 NOTE — PROGRESS NOTES
"Debridement   Wound 08/15/24 Diabetic Ulcer Toe D2, second Anterior;Left    Universal Protocol:  procedure performed by consultantConsent: Verbal consent obtained.  Risks and benefits: risks, benefits and alternatives were discussed  Consent given by: patient  Time out: Immediately prior to procedure a \"time out\" was called to verify the correct patient, procedure, equipment, support staff and site/side marked as required.  Patient identity confirmed: verbally with patient    Debridement Details  Performed by: physician  Debridement type: selective  Pain control: lidocaine 4%      Post-debridement measurements  Length (cm): 1.5  Width (cm): 1.6  Depth (cm): 0.3  Percent debrided: 100%  Surface Area (cm^2): 2.4  Area Debrided (cm^2): 2.4  Volume (cm^3): 0.72    Devitalized tissue debrided: biofilm, exudate, necrotic debris and slough  Instrument(s) utilized: curette  Bleeding: small  Hemostasis obtained with: pressure  Procedural pain (0-10): 3  Post-procedural pain: 2   Response to treatment: procedure was tolerated well        "

## 2024-08-15 NOTE — PATIENT INSTRUCTIONS
Orders Placed This Encounter   Procedures    Wound Procedure Treatment     This order was created via procedure documentation    Wound cleansing and dressings     Left foot wounds:     Wash your hands with soap and water.  Remove old dressing, discard into plastic bag and place in trash.  Cleanse the wound with dakins moistened gauze (sent to pharmacy please ) prior to applying a clean dressing. Do not use tissue or cotton balls. Do not scrub the wound. Pat dry using gauze.  Shower yes if able to keep wound dry     Apply offloading pad in u-shape to lateral foot wound only  Apply aquacel ag rope to the open wounds.    Cover and secure with bordered foam dressing.  Change dressing 3 times weekly    The above was completed today at the wound center    Left 2nd toe wound:     Wash your hands with soap and water.  Remove old dressing, discard into plastic bag and place in trash.  Cleanse the wound with dakins moistened gauze (sent to pharmacy please ) prior to applying a clean dressing. Do not use tissue or cotton balls. Do not scrub the wound. Pat dry using gauze.  Shower yes if able to keep wound dry     Apply offloading pad in u-shape to lateral foot wound only  Apply aquacel ag rope to the open wounds.    Cover with gauze  Secure with rolled gauze and tape    Change dressing 3 times weekly    The above was completed today at the wound center      Referral sent to [facility] today.     Off-loading Instructions:    Wear off-loading device as directed by your physician (Air cast as provided by Dr. Arango). Put on immediately when rising in the morning and remove when going to bed. Limit walking to only necessity.    Protein: Eat protein with each meal to promote healing.  Examples of protein are fish, meat, chicken, nuts, peanut butter, eggs, lentils, edamame or a protein shake.    Wound infection:  If you have signs of infection please call the wound center.  If the wound center is closed- please  go to the Emergency department.  Some signs of infection:  fever, chills, increased redness, red streaks, increase in pain, increased drainage.  Drainage with an odor, Change in drainage color: white/milky/green/tan/yellow,  an increase in swelling, chest pain and/or shortness of breath.     Standing Status:   Future     Standing Expiration Date:   8/22/2024

## 2024-08-15 NOTE — PROGRESS NOTES
Wound Procedure Treatment    Performed by: Nadege Pinzon RN  Authorized by: David Frye MD    Associated wounds:   Wound 08/15/24 Diabetic Ulcer Heel Left  Wound 08/15/24 Diabetic Ulcer Plantar Left;Lateral  Wound cleansed with:  NSS and Dakin's 0.125%  Applied primary dressing:  Gelling fiber, Silver and Silicone bordered foam  Offloading device appllied:  Foam padding  Comments:  Foam padding in u-Shape to left lateral foot wound  Wound Procedure Treatment Diabetic Ulcer Anterior;Left Toe D2, second    Performed by: Nadege Pinzon RN  Authorized by: David Frye MD    Associated wounds:   Wound 08/15/24 Diabetic Ulcer Toe D2, second Anterior;Left  Wound cleansed with:  Dakin's 0.125%  Applied primary dressing:  Gelling fiber and Silver  Applied secondary dressing:  Gauze  Dressing secured with:  Ender and Tape  Comments:  Aquacel ag rope to toe

## 2024-08-15 NOTE — PROGRESS NOTES
"Patient ID: Thomas Horne is a 79 y.o. male Date of Birth 1945     Chief Complaint  Chief Complaint   Patient presents with    New Patient Visit     Left foot wounds       Allergies  Lisinopril    Assessment:     Diagnoses and all orders for this visit:    Diabetic ulcer of left foot associated with type 2 diabetes mellitus, with fat layer exposed, unspecified part of foot ([facility])  -     lidocaine (LMX) 4 % cream  -     Wound Procedure Treatment  -     Cancel: Wound cleansing and dressings; Future  -     Wound cleansing and dressings; Future  -     Wound Procedure Treatment Diabetic Ulcer Anterior;Left Toe D2, second  -     Debridement  -     Debridement  -     sodium hypochlorite (DAKIN'S QUARTER-STRENGTH); Irrigate with 1 Application as directed daily    Type 2 diabetes mellitus with diabetic peripheral angiopathy without gangrene, with long-term current use of insulin ([facility])    Peripheral artery disease ([facility])              Debridement   Wound 08/15/24 Diabetic Ulcer Heel Left    Universal Protocol:  procedure performed by consultantConsent: Verbal consent obtained.  Risks and benefits: risks, benefits and alternatives were discussed  Consent given by: patient  Time out: Immediately prior to procedure a \"time out\" was called to verify the correct patient, procedure, equipment, support staff and site/side marked as required.  Patient identity confirmed: verbally with patient    Debridement Details  Performed by: physician  Debridement type: selective  Pain control: lidocaine 4%      Post-debridement measurements  Length (cm): 1.3  Width (cm): 1.4  Depth (cm): 0.1  Percent debrided: 100%  Surface Area (cm^2): 1.82  Area Debrided (cm^2): 1.82  Volume (cm^3): 0.18    Devitalized tissue debrided: biofilm and exudate  Instrument(s) utilized: curette  Bleeding: small  Hemostasis obtained with: pressure  Procedural pain (0-10): 3  Post-procedural pain: 1   Response to treatment: procedure was tolerated well    Debridement " "  Wound 08/15/24 Diabetic Ulcer Plantar Left;Lateral    Universal Protocol:  procedure performed by consultantConsent: Verbal consent obtained.  Risks and benefits: risks, benefits and alternatives were discussed  Consent given by: patient  Time out: Immediately prior to procedure a \"time out\" was called to verify the correct patient, procedure, equipment, support staff and site/side marked as required.  Patient identity confirmed: verbally with patient    Debridement Details  Performed by: physician  Debridement type: selective  Pain control: lidocaine 4%      Post-debridement measurements  Length (cm): 0.9  Width (cm): 0.9  Depth (cm): 0.1  Percent debrided: 100%  Surface Area (cm^2): 0.81  Area Debrided (cm^2): 0.81  Volume (cm^3): 0.08    Devitalized tissue debrided: biofilm and exudate  Instrument(s) utilized: curette  Bleeding: small  Hemostasis obtained with: pressure  Procedural pain (0-10): 1  Post-procedural pain: 0   Response to treatment: procedure was tolerated well        Plan:   It was a pleasure to see Thomas Horne for wound care consult today of 3 open wounds of the left foot.  Toe wound Sumner grade 3, and lateral foot and heel wounds Sumner grade 2  Selective debridement performed today as above lateral and plantar heel wounds.  Subcu debridement of toe wound.  See other note for further details improving   Start plan of care as noted below with Ag rope to toe wound, bordered foam to heel and lateral foot wound.  Offloading foam to lateral foot wound.  Patient advised to wear Aircast given by podiatry at all times and ambulatory.  Dakin solution sent to [facility] to be used on all 3 wounds.  Unfortunately patient's poor ABIs preclude total contact casting and also make him an eligible for hyperbaric oxygen treatment for DFU  Patient advised that due to arterial insufficiency he is high risk for bebeto necrosis of the wounds and amputation and limb loss.  Understands that due to poor arterial supply " we have limited tools, and he will need to be vigilant about glycemic control, offloading.  He should have a low threshold to call our office for any concerns or present himself to the ER.   A1C results reviewed with the patient today.   No signs or symptoms of infection today. Patient understands that if any signs of infection start (such as increased redness, drainage, pain, fever, chills, diaphoresis), they should call our office or proceed to the ER or Urgent Care.  Patient should continue a high protein diet to facilitate wound healing  Patient is advised to not submerge wound or leave wound open to air.  Follow up in 1 weeks  Given the multi-factorial nature of wound care, additional time was taken to review patient's treatment plan with other specialties and most recent pertinent lab work and imaging.   All plans of care discussed with patient at bedside who verbalized understanding with treatment plan.    Wound 08/15/24 Diabetic Ulcer Heel Left (Active)   Enter Sumenr score: Sumner Grade 3: Deep abscess, OM, or joint sepsis 08/15/24 0945   Wound Image Images linked 08/15/24 0945   Wound Description Yellow;White;Slough;Pink;Gray 08/15/24 0945   Clara-wound Assessment Fragile 08/15/24 0945   Wound Length (cm) 1.3 cm 08/15/24 0945   Wound Width (cm) 1.4 cm 08/15/24 0945   Wound Depth (cm) 0.1 cm 08/15/24 0945   Wound Surface Area (cm^2) 1.82 cm^2 08/15/24 0945   Wound Volume (cm^3) 0.182 cm^3 08/15/24 0945   Calculated Wound Volume (cm^3) 0.18 cm^3 08/15/24 0945   Drainage Amount Moderate 08/15/24 0945   Drainage Description Serosanguineous 08/15/24 0945   Non-staged Wound Description Full thickness 08/15/24 0945   Dressing Status Intact 08/15/24 0945       Wound 08/15/24 Diabetic Ulcer Plantar Left;Lateral (Active)   Enter Sumner score: Sumner Grade 2: Deep ulcer extended to ligament, tendon, joint capsule, bone, or deep fascia without abscess or osteomyelitis (OM) 08/15/24 0946   Wound Image Images linked  08/15/24 0946   Wound Description Yellow;Pink;Slough 08/15/24 0946   Clara-wound Assessment Pink 08/15/24 0946   Wound Length (cm) 0.9 cm 08/15/24 0946   Wound Width (cm) 0.9 cm 08/15/24 0946   Wound Depth (cm) 0.1 cm 08/15/24 0946   Wound Surface Area (cm^2) 0.81 cm^2 08/15/24 0946   Wound Volume (cm^3) 0.081 cm^3 08/15/24 0946   Calculated Wound Volume (cm^3) 0.08 cm^3 08/15/24 0946   Drainage Amount Moderate 08/15/24 0946   Drainage Description Serosanguineous 08/15/24 0946   Non-staged Wound Description Full thickness 08/15/24 0946   Dressing Status Intact 08/15/24 0946       Wound 08/15/24 Diabetic Ulcer Toe D2, second Anterior;Left (Active)   Enter Sumner score: Sumner Grade 3: Deep abscess, OM, or joint sepsis 08/15/24 0946   Wound Image Images linked 08/15/24 1011   Wound Description Yellow;White;Slough;Pink 08/15/24 0946   Clara-wound Assessment Pink 08/15/24 0946   Wound Length (cm) 1.4 cm 08/15/24 0946   Wound Width (cm) 1.5 cm 08/15/24 0946   Wound Depth (cm) 0.2 cm 08/15/24 0946   Wound Surface Area (cm^2) 2.1 cm^2 08/15/24 0946   Wound Volume (cm^3) 0.42 cm^3 08/15/24 0946   Calculated Wound Volume (cm^3) 0.42 cm^3 08/15/24 0946   Drainage Amount Moderate 08/15/24 0946   Drainage Description Serosanguineous 08/15/24 0946   Non-staged Wound Description Full thickness 08/15/24 0946   Dressing Status Intact 08/15/24 0946       Wound 08/07/24 Groin Left (Active)   Date First Assessed/Time First Assessed: 08/07/24 0927   Location: Groin  Wound Location Orientation: Left  Incision's 1st Dressing: ADHESIVE SKIN HIGH VISCOSITY EXOFIN PRECISION PEN (x0)       Wound 08/15/24 Diabetic Ulcer Heel Left (Active)   Date First Assessed/Time First Assessed: 08/15/24 0943   Primary Wound Type: Diabetic Ulcer  Location: Heel  Wound Location Orientation: Left       Wound 08/15/24 Diabetic Ulcer Plantar Left;Lateral (Active)   Date First Assessed/Time First Assessed: 08/15/24 0943   Primary Wound Type: Diabetic Ulcer   Location: Plantar  Wound Location Orientation: Left;Lateral       Wound 08/15/24 Diabetic Ulcer Toe D2, second Anterior;Left (Active)   Date First Assessed/Time First Assessed: 08/15/24 0943   Primary Wound Type: Diabetic Ulcer  Location: Toe D2, second  Wound Location Orientation: Anterior;Left       [REMOVED] Wound 12/21/23 Diabetic Ulcer Ankle Posterior;Right (Removed)   Resolved Date: 08/15/24  Date First Assessed/Time First Assessed: 12/21/23 0954   Primary Wound Type: Diabetic Ulcer  Location: Ankle  Wound Location Orientation: Posterior;Right  Wound Description (Comments): MACDONALD 2  Wound Outcome: Unknown (No long...       Subjective:      .    8/15/24: Consult - Thomas is a pleasant 79-year-old male with a past medical history of hypertension, coronary artery disease s/p CABG, peripheral arterial disease with history of intervention, HfPEF G2DD, type 2 diabetes mellitus with most recent A1c 7.9% 4 months ago, history of DVT, CKD stage IIIb, and prior tobacco use here today for wound care consult.  Patient is known to wound management for treatment of diabetic foot ulcers which have healed in the past.  He is referred today by podiatrist Dr. Sundar Arango.  Per chart review patient was being followed for 3 open wounds of the left foot.  His most recent appointment on 8 August patient also complained of increasing rest pain throughout the leg.  He was given short prescription of Percocet as well as doxycycline and given stat referral for IR intervention.  Also given referral to wound management that day given deterioration of the wounds.  Unfortunately per operative notes, IR was unable to revascularize the left lower extremity.  Thomas is scheduled for bypass on September 17.  He was also given referral for interventional spine and pain to take over management of diabetic neuropathy.  Pain physician gave patient weaning protocol for gabapentin with plan to rotate to Lyrica.  Presents without his Aircast today.   Denies any symptoms of infection including fever chills diaphoresis.  States that his biggest complaint is that of pain.    LEADs 7/29/24  RIGHT LOWER LIMB:  Evaluation shows severe lower extremity arterial occlusive disease with a  50-75% stenosis in the deep femoral artery. There is also tibio-peroneal  arterial occlusive disease. The femoral to popliteal artery bypass graft could  not be identified suggesting occlusion.  Ankle/Brachial index: 0.48 and in the ischemic range. Prior  0.73.  PVR/ PPG tracings are dampened.  Metatarsal pressure: 80 mm Hg,  and prior non-compressible.  Great toe pressure of 20 mm Hg, below the healing range for a diabetic. Prior  31 mmHg.     LEFT LOWER LIMB:  Ankle/Brachial index: 0.48 which is in the ischemic disease category. (Prior:  0.38)  PVR/ PPG tracings are dampened.  Metatarsal pressure was not obtained secondary to severely diminished PPG  signal. Prior 38 mm Hg.  Great toe pressure was also not obtained. Prior 28 mm Hg,     Compared to previous study on 6-21-24 the occluded fem popliteal bypass graft  is a new finding.    XR left foot 7/10/24: No acute osseous abnormality. Moderate-sized calcaneal enthesoph          The following portions of the patient's history were reviewed and updated as appropriate: allergies, current medications, past family history, past medical history, past social history, past surgical history, and problem list.    Review of Systems   Constitutional:  Negative for chills, diaphoresis and fever.   Skin:  Positive for wound.   All other systems reviewed and are negative.        Objective:       Wound 08/15/24 Diabetic Ulcer Heel Left (Active)   Enter Sumner score: Sumner Grade 3: Deep abscess, OM, or joint sepsis 08/15/24 0945   Wound Image Images linked 08/15/24 0945   Wound Description Yellow;White;Slough;Pink;Gray 08/15/24 0945   Clara-wound Assessment Fragile 08/15/24 0945   Wound Length (cm) 1.3 cm 08/15/24 0945   Wound Width (cm) 1.4 cm  08/15/24 0945   Wound Depth (cm) 0.1 cm 08/15/24 0945   Wound Surface Area (cm^2) 1.82 cm^2 08/15/24 0945   Wound Volume (cm^3) 0.182 cm^3 08/15/24 0945   Calculated Wound Volume (cm^3) 0.18 cm^3 08/15/24 0945   Drainage Amount Moderate 08/15/24 0945   Drainage Description Serosanguineous 08/15/24 0945   Non-staged Wound Description Full thickness 08/15/24 0945   Dressing Status Intact 08/15/24 0945       Wound 08/15/24 Diabetic Ulcer Plantar Left;Lateral (Active)   Enter Sumner score: Sumner Grade 2: Deep ulcer extended to ligament, tendon, joint capsule, bone, or deep fascia without abscess or osteomyelitis (OM) 08/15/24 0946   Wound Image Images linked 08/15/24 0946   Wound Description Yellow;Pink;Slough 08/15/24 0946   Clara-wound Assessment Pink 08/15/24 0946   Wound Length (cm) 0.9 cm 08/15/24 0946   Wound Width (cm) 0.9 cm 08/15/24 0946   Wound Depth (cm) 0.1 cm 08/15/24 0946   Wound Surface Area (cm^2) 0.81 cm^2 08/15/24 0946   Wound Volume (cm^3) 0.081 cm^3 08/15/24 0946   Calculated Wound Volume (cm^3) 0.08 cm^3 08/15/24 0946   Drainage Amount Moderate 08/15/24 0946   Drainage Description Serosanguineous 08/15/24 0946   Non-staged Wound Description Full thickness 08/15/24 0946   Dressing Status Intact 08/15/24 0946       Wound 08/15/24 Diabetic Ulcer Toe D2, second Anterior;Left (Active)   Enter Sumner score: Sumner Grade 3: Deep abscess, OM, or joint sepsis 08/15/24 0946   Wound Image Images linked 08/15/24 1011   Wound Description Yellow;White;Slough;Pink 08/15/24 0946   Clara-wound Assessment Pink 08/15/24 0946   Wound Length (cm) 1.4 cm 08/15/24 0946   Wound Width (cm) 1.5 cm 08/15/24 0946   Wound Depth (cm) 0.2 cm 08/15/24 0946   Wound Surface Area (cm^2) 2.1 cm^2 08/15/24 0946   Wound Volume (cm^3) 0.42 cm^3 08/15/24 0946   Calculated Wound Volume (cm^3) 0.42 cm^3 08/15/24 0946   Drainage Amount Moderate 08/15/24 0946   Drainage Description Serosanguineous 08/15/24 0946   Non-staged Wound Description  "Full thickness 08/15/24 0946   Dressing Status Intact 08/15/24 0946       /87   Pulse 69   Temp (!) 97.1 °F (36.2 °C)   Resp 18   Ht 6' 2\" (1.88 m)   Wt 103 kg (227 lb)   BMI 29.15 kg/m²     Physical Exam  Vitals reviewed.   Constitutional:       Appearance: Normal appearance.   HENT:      Head: Normocephalic and atraumatic.   Eyes:      Extraocular Movements: Extraocular movements intact.   Pulmonary:      Effort: Pulmonary effort is normal.   Musculoskeletal:      Cervical back: Neck supple.   Skin:     Comments: Still plantar second toe wound of the left foot full-thickness.  Not probing to bone but extends into fatty tissue.  Periwound maceration.  Clean odor.    Lateral foot wound also full-thickness.  Fibrin deposition in the wound bed.  No signs of infection.  Not probing to bone.  Oval-shaped.    Oval wound of plantar left heel.  Devitalized tissue and fibrin in the wound bed.  Not probing to bone.  No signs of infection.   Neurological:      Mental Status: He is alert.   Psychiatric:         Mood and Affect: Mood normal.                         Wound Instructions:  Orders Placed This Encounter   Procedures    Wound Procedure Treatment     This order was created via procedure documentation    Wound cleansing and dressings     Left foot wounds:     Wash your hands with soap and water.  Remove old dressing, discard into plastic bag and place in trash.  Cleanse the wound with dakins moistened gauze (sent to pharmacy please ) prior to applying a clean dressing. Do not use tissue or cotton balls. Do not scrub the wound. Pat dry using gauze.  Shower yes if able to keep wound dry     Apply offloading pad in u-shape to lateral foot wound only  Apply aquacel ag rope to the open wounds.    Cover and secure with bordered foam dressing.  Change dressing 3 times weekly    The above was completed today at the wound center    Left 2nd toe wound:     Wash your hands with soap and water.  Remove old " dressing, discard into plastic bag and place in trash.  Cleanse the wound with dakins moistened gauze (sent to pharmacy please ) prior to applying a clean dressing. Do not use tissue or cotton balls. Do not scrub the wound. Pat dry using gauze.  Shower yes if able to keep wound dry     Apply offloading pad in u-shape to lateral foot wound only  Apply aquacel ag rope to the open wounds.    Cover with gauze  Secure with rolled gauze and tape    Change dressing 3 times weekly    The above was completed today at the wound center      Referral sent to [facility] today.     Off-loading Instructions:    Wear off-loading device as directed by your physician (Air cast as provided by Dr. Arango). Put on immediately when rising in the morning and remove when going to bed. Limit walking to only necessity.    Protein: Eat protein with each meal to promote healing.  Examples of protein are fish, meat, chicken, nuts, peanut butter, eggs, lentils, edamame or a protein shake.    Wound infection:  If you have signs of infection please call the wound center.  If the wound center is closed- please go to the Emergency department.  Some signs of infection:  fever, chills, increased redness, red streaks, increase in pain, increased drainage.  Drainage with an odor, Change in drainage color: white/milky/green/tan/yellow,  an increase in swelling, chest pain and/or shortness of breath.     Standing Status:   Future     Standing Expiration Date:   8/22/2024    Wound Procedure Treatment Diabetic Ulcer Anterior;Left Toe D2, second     This order was created via procedure documentation    Debridement     This order was created via procedure documentation    Debridement     This order was created via procedure documentation        Diagnosis ICD-10-CM Associated Orders   1. Diabetic ulcer of left foot associated with type 2 diabetes mellitus, with fat layer exposed, unspecified part of foot ([facility])  E11.621 lidocaine (LMX) 4 % cream  "   L97.522 Wound Procedure Treatment     Wound cleansing and dressings     Wound Procedure Treatment Diabetic Ulcer Anterior;Left Toe D2, second     Debridement     Debridement     sodium hypochlorite (DAKIN'S QUARTER-STRENGTH)      2. Type 2 diabetes mellitus with diabetic peripheral angiopathy without gangrene, with long-term current use of insulin ([facility])  E11.51     Z79.4       3. Peripheral artery disease ([facility])  I73.9           --  David Frye MD    \"This note has been constructed using a voice recognition system. Therefore there may be syntax, spelling, and/or grammatical errors. Occasional wrong word or \"sound alike\" substitutions may have occurred due to the inherent limitations of voice recognition software. Read the chart carefully and recognize, using context, where substitutions have occurred. Please call if you have any questions.\"     "

## 2024-08-19 NOTE — PROGRESS NOTES
NEPHROLOGY OFFICE VISIT   Thomas Horne 79 y.o. male MRN: [id number]  8/20/2024    Reason for Visit: Follow-up for management of CKD    INTERVAL HISTORY and SUBJECTIVE:    I had the pleasure of seeing Thomas today in the renal clinic.  He is a 79-year-old male who follows with Dr. Fischer for management of CKD.  He was last seen in the nephrology clinic April 2024.  Thomas is dealing with foot wound.  Completing antibiotics likely today.  Going for further vascular surgery in the middle of next month.    Last labs obtained on 7/22/2024, 8/2/2024: Arteriogram was done on 8/7/2024    ASSESSMENT AND PLAN:    Chronic kidney disease, stage IIIb:  Baseline creatinine: 1.6 to 1.8 mg/dL  Creatinine up to 2.07 in April 2024 which may have been related to acute anemia.  At that time UACR less than 12.  Prior level of UACR 47 with urine protein creatinine ratio 0.32 in October 2023  Follow-up creatinine since April 1.71-->1.5-->1.86  Etiology/risk factors: History of hypertension, diabetes.  Likely related to hypertensive nephrosclerosis, diabetic kidney disease and cardiorenal syndrome.  As of April creatinine was trending slightly above baseline which was felt to be possibly due to anemia  Follow-up blood work on 8/2/2024: Reviewed with Thomas  Creatinine 1.86.  Essentially staying at baseline at this time  Cleared for procedure next month pending follow-up labs    Electrolytes/acid-base: Acceptable    Hypertension:  Goal blood pressure less than 130/80  Medications: Metoprolol tartrate 25 mg twice a day, clonidine 0.1 mg twice daily, torsemide 20 mg daily, ISMN 60 mg daily, amlodipine 5 mg daily  Previously on losartan which has been held since hospitalization in June 2023  Blood pressure at goal.  No changes  Continue to hold losartan at this time.  Patient going for surgery next month    CHF:  Generally on torsemide 20 mg daily  Well compensated  Patient instructed to hold torsemide on day of surgery next month    Iron  deficiency anemia:  Previously required administration of Venofer earlier this year  Last iron panel approximately 1 month ago: Ferritin 91, iron saturation 5%  Hemoglobin 14.0.  Prior levels improving 11.7<--10.1<--9.1<--9  Following with hematology  Finishing antibiotics at this time.  No Venofer at this time.  Continue every other day iron supplement    CKD MBD:  Phosphorus normal, 2.4  Magnesium normal, 2.0    PAD:  Recent arteriogram on 8/7/2024  Renoprotective recommendations were given by Dr. Fischer on 7/30  Surgery planned for September 2024  Patient instructed to hold torsemide on day of surgery.  Blood work prior to surgery and follow-up blood work after surgery ordered    Diabetic foot ulcer:  Follows with wound care, podiatry  Per podiatry's last note extreme risk for BKA.    Other: Diabetes mellitus type 2 with neuropathy    Note sent to vascular for review.  PATIENT INSTRUCTIONS:    Patient Instructions   It was a pleasure seeing you today in the office.  We are following you for management of chronic kidney disease which at this time is quite stable and at baseline.  We will continue to follow closely recommend follow-up appointment in approximately 4 months.  Concerning the appointment/hospitalization plan for next month blood work has been carbon copy to our group so we can keep an eye on your kidney function and make further recommendations if needed.  I will be forwarding this note to your surgeon/vascular group for review.    Further recommendations:  On the day of the procedure recommend holding torsemide, Jardiance and fenofibrate  Blood work as ordered by vascular has been carbon copy to our group  Avoid NSAIDs such as Motrin Aleve ibuprofen and Advil.  If you are unsure about the safety of the medication call our office or asked the pharmacist for assistance  Please call with any questions or concerns at any time      Orders Placed This Encounter   Procedures    Albumin / creatinine urine  "ratio     Standing Status:   Future     Standing Expiration Date:   8/20/2025    CBC     Standing Status:   Future     Standing Expiration Date:   8/20/2025    Magnesium     Standing Status:   Future     Standing Expiration Date:   8/20/2025    Renal function panel     Standing Status:   Future     Standing Expiration Date:   8/20/2025    PTH, intact     Standing Status:   Future     Standing Expiration Date:   8/20/2025    Urinalysis with microscopic     Standing Status:   Future     Standing Expiration Date:   8/20/2025    Vitamin D 25 hydroxy     Standing Status:   Future     Standing Expiration Date:   8/20/2025       OBJECTIVE:  Current Weight: Weight - Scale: 104 kg (230 lb)  Vitals:    08/20/24 0828   BP: 128/62   BP Location: Left arm   Patient Position: Sitting   Cuff Size: Standard   Pulse: 62   SpO2: 95%   Weight: 104 kg (230 lb)   Height: 6' 2\" (1.88 m)    Body mass index is 29.53 kg/m².      REVIEW OF SYSTEMS:    Review of Systems   Constitutional:  Negative for appetite change, chills, fever and unexpected weight change.   HENT:  Negative for congestion and sore throat.    Eyes:  Negative for pain and visual disturbance.   Respiratory:  Negative for cough and shortness of breath.    Cardiovascular:  Negative for chest pain, palpitations and leg swelling.   Gastrointestinal:  Negative for abdominal pain, constipation, diarrhea, nausea and vomiting.   Endocrine: Negative.    Genitourinary:  Negative for difficulty urinating, dysuria and hematuria.   Musculoskeletal:  Positive for arthralgias and gait problem. Negative for back pain.   Skin:  Positive for wound (Foot wound.  Going for further vascular surgery next month). Negative for color change and rash.   Allergic/Immunologic: Negative for immunocompromised state.   Neurological:  Negative for dizziness, seizures, syncope, weakness, light-headedness and headaches.   Psychiatric/Behavioral:  The patient is not nervous/anxious.    All other systems " reviewed and are negative.      PHYSICAL EXAM:      Physical Exam  Vitals reviewed.   Constitutional:       General: He is not in acute distress.     Appearance: He is well-developed. He is not ill-appearing, toxic-appearing or diaphoretic.   HENT:      Head: Normocephalic and atraumatic.   Eyes:      Pupils: Pupils are equal, round, and reactive to light.   Neck:      Thyroid: No thyromegaly.      Vascular: No JVD.      Trachea: No tracheal deviation.   Cardiovascular:      Rate and Rhythm: Normal rate. Rhythm irregular.      Heart sounds: Murmur heard.      Systolic murmur is present with a grade of 3/6.      No friction rub. No gallop.   Pulmonary:      Effort: Pulmonary effort is normal.      Breath sounds: Normal breath sounds.   Abdominal:      General: Bowel sounds are normal. There is no distension.      Palpations: Abdomen is soft. There is no mass.      Tenderness: There is no abdominal tenderness. There is no guarding or rebound.   Musculoskeletal:         General: Swelling (Foot in boot with sock.  No significant swelling noted) and tenderness present. No signs of injury. Normal range of motion.      Cervical back: Normal range of motion and neck supple.      Right lower leg: No edema.      Left lower leg: No edema.   Skin:     General: Skin is warm and dry.      Capillary Refill: Capillary refill takes less than 2 seconds.      Coloration: Skin is not jaundiced or pale.      Findings: No bruising, erythema or rash.   Neurological:      Mental Status: He is alert and oriented to person, place, and time.   Psychiatric:         Mood and Affect: Mood normal.         Behavior: Behavior normal.         Thought Content: Thought content normal.         Judgment: Judgment normal.         Medications:    Current Outpatient Medications:     amLODIPine (NORVASC) 10 mg tablet, Take 0.5 tablets (5 mg total) by mouth daily, Disp: 45 tablet, Rfl: 1    aspirin 81 mg chewable tablet, Chew 81 mg daily, Disp: , Rfl:      atorvastatin (LIPITOR) 40 mg tablet, Take 1 tablet (40 mg total) by mouth daily (Patient taking differently: Take 40 mg by mouth daily at bedtime), Disp: 90 tablet, Rfl: 1    cloNIDine (CATAPRES) 0.1 mg tablet, take 1 tablet by mouth every 12 hours, Disp: 180 tablet, Rfl: 1    DULoxetine (Cymbalta) 30 mg delayed release capsule, Take 1 capsule (30 mg total) by mouth daily, Disp: 30 capsule, Rfl: 2    Empagliflozin (Jardiance) 25 MG TABS, TAKE 1 TABLET BY MOUTH DAILY, Disp: 90 tablet, Rfl: 1    fenofibrate 160 MG tablet, TAKE 1 TABLET BY MOUTH DAILY, Disp: 100 tablet, Rfl: 1    glimepiride (AMARYL) 2 mg tablet, Take 1 tablet (2 mg total) by mouth 2 (two) times a day, Disp: 180 tablet, Rfl: 3    insulin glargine (LANTUS) 100 units/mL subcutaneous injection, Inject 15 Units under the skin daily at bedtime (Patient taking differently: Inject 15 Units under the skin if needed), Disp: 10 mL, Rfl: 0    isosorbide mononitrate (IMDUR) 30 mg 24 hr tablet, Take 3 tablets (90 mg total) by mouth daily, Disp: 270 tablet, Rfl: 1    metFORMIN (GLUCOPHAGE) 500 mg tablet, Take 1 tablet (500 mg total) by mouth 2 (two) times a day with meals, Disp: 180 tablet, Rfl: 1    metoprolol tartrate (LOPRESSOR) 50 mg tablet, Take 0.5 tablets (25 mg total) by mouth every 12 (twelve) hours, Disp: 90 tablet, Rfl: 1    Multiple Vitamins-Minerals (MULTIVITAMIN MEN 50+ PO), Take by mouth daily, Disp: , Rfl:     Omega-3 Fatty Acids (fish oil) 1,000 mg, Take 4,000 mg by mouth 2 (two) times a day, Disp: , Rfl:     pantoprazole (PROTONIX) 40 mg tablet, Take 1 tablet (40 mg total) by mouth daily, Disp: 90 tablet, Rfl: 1    pentoxifylline (TRENtal) 400 mg ER tablet, Take 1 tablet (400 mg total) by mouth 3 (three) times a day with meals, Disp: 90 tablet, Rfl: 2    pregabalin (LYRICA) 100 mg capsule, Take 1 capsule (100 mg total) by mouth 3 (three) times a day, Disp: 90 capsule, Rfl: 0    ranolazine (RANEXA) 1000 MG SR tablet, Take 1 tablet (1,000 mg total)  "by mouth 2 (two) times a day, Disp: 180 tablet, Rfl: 3    sitaGLIPtin (Januvia) 100 mg tablet, TAKE 1 TABLET BY MOUTH DAILY, Disp: 100 tablet, Rfl: 1    torsemide (DEMADEX) 20 mg tablet, TAKE 1 TABLET BY MOUTH DAILY, Disp: 90 tablet, Rfl: 1    warfarin (Coumadin) 2.5 mg tablet, Qd and as directed, Disp: 90 tablet, Rfl: 1    Blood Glucose Monitoring Suppl (OneTouch Verio Reflect) w/Device KIT, Check blood sugars twice daily. Please substitute with appropriate alternative as covered by patient's insurance. Dx: E11.65, Disp: 1 kit, Rfl: 0    Droplet Pen Needles 32G X 4 MM MISC, USE EVERY EVENING, Disp: 100 each, Rfl: 5    glucose blood (OneTouch Verio) test strip, TEST TWICE A DAY, Disp: 200 strip, Rfl: 5    ketoconazole (NIZORAL) 2 % cream, Apply topically daily, Disp: 60 g, Rfl: 1    nitroglycerin (NITROSTAT) 0.4 mg SL tablet, Place 1 tablet (0.4 mg total) under the tongue every 5 (five) minutes as needed for chest pain, Disp: 30 tablet, Rfl: 0    ondansetron (ZOFRAN) 4 mg tablet, Take 4 mg by mouth every 8 (eight) hours as needed for nausea or vomiting (Patient not taking: Reported on 8/15/2024), Disp: , Rfl:     OneTouch Delica Lancets 33G MISC, Check blood sugars twice daily. Please substitute with appropriate alternative as covered by patient's insurance. Dx: E11.65, Disp: 200 each, Rfl: 3    sodium hypochlorite (DAKIN'S QUARTER-STRENGTH), Irrigate with 1 Application as directed daily, Disp: 473 mL, Rfl: 1    warfarin (COUMADIN) 3 mg tablet, take 1 tablet by mouth once daily, Disp: 90 tablet, Rfl: 0    Laboratory Results:        Invalid input(s): \"ALBUMIN\"    Results for orders placed or performed in visit on 08/08/24   Wound culture and Gram stain    Specimen: Wound   Result Value Ref Range    Wound Culture 4+ Growth of Klebsiella pneumoniae (A)     Wound Culture 3+ Growth of Citrobacter koseri (A)     Wound Culture 2+ Growth of Pseudomonas aeruginosa (A)     Wound Culture 2+ Growth of     Gram Stain Result No " polys seen (A)     Gram Stain Result 3+ Gram positive cocci in pairs and chains (A)     Gram Stain Result 2+ Gram variable rods (A)        Susceptibility    Citrobacter koseri - TEX     ZID Performed Yes       Amoxicillin + Clavulanate <=8/4 Susceptible ug/ml     Ampicillin ($$) >16.00 Resistant ug/ml     Ampicillin + Sulbactam ($) <=4/2 Susceptible ug/ml     Aztreonam ($$$)  <=4 Susceptible ug/ml     Cefazolin ($) <=2.00 Susceptible ug/ml     Ciprofloxacin ($)  <=0.25 Susceptible ug/ml     Gentamicin ($$) <=2 Susceptible ug/ml     Levofloxacin ($) <=0.50 Susceptible ug/ml     Tetracycline <=4 Susceptible ug/ml     Trimethoprim + Sulfamethoxazole ($$$) <=0.5/9.5 Susceptible ug/ml    Klebsiella pneumoniae - TEX     ZID Performed Yes       Amoxicillin + Clavulanate <=8/4 Susceptible ug/ml     Ampicillin ($$) >16.00 Resistant ug/ml     Ampicillin + Sulbactam ($) 8/4 Susceptible ug/ml     Aztreonam ($$$)  <=4 Susceptible ug/ml     Cefazolin ($) <=2.00 Susceptible ug/ml     Ciprofloxacin ($)  <=0.25 Susceptible ug/ml     Gentamicin ($$) <=2 Susceptible ug/ml     Levofloxacin ($) <=0.50 Susceptible ug/ml     Minocycline 8 Intermediate ug/ml     Tetracycline >8 Resistant ug/ml     Trimethoprim + Sulfamethoxazole ($$$) <=0.5/9.5 Susceptible ug/ml    Pseudomonas aeruginosa - TEX     ZID Performed Yes       Aztreonam ($$$)  <=4 Susceptible ug/ml     Cefepime ($) <=2.00 Susceptible ug/ml     Ceftazidime ($$) <=1 Susceptible ug/ml     Ciprofloxacin ($)  <=0.25 Susceptible ug/ml     Levofloxacin ($) <=0.50 Susceptible ug/ml     Meropenem ($$) <=1.00 Susceptible ug/ml     Piperacillin + Tazobactam ($$$) <=8 Susceptible ug/ml

## 2024-08-20 ENCOUNTER — OFFICE VISIT (OUTPATIENT)
Dept: NEPHROLOGY | Facility: CLINIC | Age: 79
End: 2024-08-20
Payer: COMMERCIAL

## 2024-08-20 VITALS
OXYGEN SATURATION: 95 % | SYSTOLIC BLOOD PRESSURE: 128 MMHG | BODY MASS INDEX: 29.52 KG/M2 | HEIGHT: 74 IN | WEIGHT: 230 LBS | DIASTOLIC BLOOD PRESSURE: 62 MMHG | HEART RATE: 62 BPM

## 2024-08-20 DIAGNOSIS — E11.51 TYPE 2 DIABETES MELLITUS WITH DIABETIC PERIPHERAL ANGIOPATHY WITHOUT GANGRENE, WITH LONG-TERM CURRENT USE OF INSULIN (HCC): ICD-10-CM

## 2024-08-20 DIAGNOSIS — Z86.79 S/P AAA REPAIR: ICD-10-CM

## 2024-08-20 DIAGNOSIS — N18.9 CHRONIC KIDNEY DISEASE-MINERAL AND BONE DISORDER: ICD-10-CM

## 2024-08-20 DIAGNOSIS — E11.42 TYPE 2 DIABETES MELLITUS WITH DIABETIC POLYNEUROPATHY, WITH LONG-TERM CURRENT USE OF INSULIN (HCC): ICD-10-CM

## 2024-08-20 DIAGNOSIS — M89.9 CHRONIC KIDNEY DISEASE-MINERAL AND BONE DISORDER: ICD-10-CM

## 2024-08-20 DIAGNOSIS — Z95.1 HX OF CABG: ICD-10-CM

## 2024-08-20 DIAGNOSIS — E78.2 MIXED HYPERLIPIDEMIA: Primary | ICD-10-CM

## 2024-08-20 DIAGNOSIS — Z79.4 TYPE 2 DIABETES MELLITUS WITH DIABETIC PERIPHERAL ANGIOPATHY WITHOUT GANGRENE, WITH LONG-TERM CURRENT USE OF INSULIN (HCC): ICD-10-CM

## 2024-08-20 DIAGNOSIS — I25.10 CORONARY ARTERY DISEASE INVOLVING NATIVE CORONARY ARTERY OF NATIVE HEART WITHOUT ANGINA PECTORIS: ICD-10-CM

## 2024-08-20 DIAGNOSIS — Z98.890 S/P AAA REPAIR: ICD-10-CM

## 2024-08-20 DIAGNOSIS — Z79.4 TYPE 2 DIABETES MELLITUS WITH DIABETIC POLYNEUROPATHY, WITH LONG-TERM CURRENT USE OF INSULIN (HCC): ICD-10-CM

## 2024-08-20 DIAGNOSIS — D50.8 OTHER IRON DEFICIENCY ANEMIA: ICD-10-CM

## 2024-08-20 DIAGNOSIS — E83.9 CHRONIC KIDNEY DISEASE-MINERAL AND BONE DISORDER: ICD-10-CM

## 2024-08-20 DIAGNOSIS — N18.32 STAGE 3B CHRONIC KIDNEY DISEASE (HCC): ICD-10-CM

## 2024-08-20 PROCEDURE — 99214 OFFICE O/P EST MOD 30 MIN: CPT | Performed by: NURSE PRACTITIONER

## 2024-08-20 NOTE — PATIENT INSTRUCTIONS
It was a pleasure seeing you today in the office.  We are following you for management of chronic kidney disease which at this time is quite stable and at baseline.  We will continue to follow closely recommend follow-up appointment in approximately 4 months.  Concerning the appointment/hospitalization plan for next month blood work has been carbon copy to our group so we can keep an eye on your kidney function and make further recommendations if needed.  I will be forwarding this note to your surgeon/vascular group for review.    Further recommendations:  On the day of the procedure recommend holding torsemide, Jardiance and fenofibrate  Blood work as ordered by vascular has been carbon copy to our group  Avoid NSAIDs such as Motrin Aleve ibuprofen and Advil.  If you are unsure about the safety of the medication call our office or asked the pharmacist for assistance  Please call with any questions or concerns at any time

## 2024-08-22 ENCOUNTER — OFFICE VISIT (OUTPATIENT)
Dept: VASCULAR SURGERY | Facility: CLINIC | Age: 79
End: 2024-08-22
Payer: COMMERCIAL

## 2024-08-22 ENCOUNTER — APPOINTMENT (OUTPATIENT)
Dept: LAB | Facility: CLINIC | Age: 79
End: 2024-08-22
Payer: COMMERCIAL

## 2024-08-22 ENCOUNTER — OFFICE VISIT (OUTPATIENT)
Dept: WOUND CARE | Facility: HOSPITAL | Age: 79
End: 2024-08-22
Payer: COMMERCIAL

## 2024-08-22 ENCOUNTER — TELEPHONE (OUTPATIENT)
Dept: HEMATOLOGY ONCOLOGY | Facility: MEDICAL CENTER | Age: 79
End: 2024-08-22

## 2024-08-22 VITALS
DIASTOLIC BLOOD PRESSURE: 73 MMHG | HEART RATE: 73 BPM | BODY MASS INDEX: 29.65 KG/M2 | HEIGHT: 74 IN | SYSTOLIC BLOOD PRESSURE: 110 MMHG | WEIGHT: 231 LBS

## 2024-08-22 VITALS
HEART RATE: 50 BPM | TEMPERATURE: 95.7 F | RESPIRATION RATE: 18 BRPM | DIASTOLIC BLOOD PRESSURE: 58 MMHG | SYSTOLIC BLOOD PRESSURE: 132 MMHG

## 2024-08-22 DIAGNOSIS — I73.9 PERIPHERAL ARTERY DISEASE (HCC): ICD-10-CM

## 2024-08-22 DIAGNOSIS — Z79.4 TYPE 2 DIABETES MELLITUS WITH DIABETIC PERIPHERAL ANGIOPATHY WITHOUT GANGRENE, WITH LONG-TERM CURRENT USE OF INSULIN (HCC): ICD-10-CM

## 2024-08-22 DIAGNOSIS — I73.9 PERIPHERAL ARTERY DISEASE (HCC): Primary | ICD-10-CM

## 2024-08-22 DIAGNOSIS — E11.51 TYPE 2 DIABETES MELLITUS WITH DIABETIC PERIPHERAL ANGIOPATHY WITHOUT GANGRENE, WITH LONG-TERM CURRENT USE OF INSULIN (HCC): ICD-10-CM

## 2024-08-22 DIAGNOSIS — L97.522 DIABETIC ULCER OF LEFT FOOT ASSOCIATED WITH TYPE 2 DIABETES MELLITUS, WITH FAT LAYER EXPOSED, UNSPECIFIED PART OF FOOT (HCC): Primary | ICD-10-CM

## 2024-08-22 DIAGNOSIS — I82.452 ACUTE DEEP VEIN THROMBOSIS (DVT) OF LEFT PERONEAL VEIN (HCC): ICD-10-CM

## 2024-08-22 DIAGNOSIS — E11.621 DIABETIC ULCER OF LEFT FOOT ASSOCIATED WITH TYPE 2 DIABETES MELLITUS, WITH FAT LAYER EXPOSED, UNSPECIFIED PART OF FOOT (HCC): Primary | ICD-10-CM

## 2024-08-22 DIAGNOSIS — D64.9 SYMPTOMATIC ANEMIA: ICD-10-CM

## 2024-08-22 LAB
BASOPHILS # BLD AUTO: 0.03 THOUSANDS/ÂΜL (ref 0–0.1)
BASOPHILS NFR BLD AUTO: 0 % (ref 0–1)
EOSINOPHIL # BLD AUTO: 0.17 THOUSAND/ÂΜL (ref 0–0.61)
EOSINOPHIL NFR BLD AUTO: 2 % (ref 0–6)
ERYTHROCYTE [DISTWIDTH] IN BLOOD BY AUTOMATED COUNT: 16.4 % (ref 11.6–15.1)
FERRITIN SERPL-MCNC: 100 NG/ML (ref 24–336)
HCT VFR BLD AUTO: 44.3 % (ref 36.5–49.3)
HGB BLD-MCNC: 13.6 G/DL (ref 12–17)
IMM GRANULOCYTES # BLD AUTO: 0.03 THOUSAND/UL (ref 0–0.2)
IMM GRANULOCYTES NFR BLD AUTO: 0 % (ref 0–2)
INR PPP: 2.19 (ref 0.85–1.19)
IRON SATN MFR SERPL: 10 % (ref 15–50)
IRON SERPL-MCNC: 28 UG/DL (ref 50–212)
LYMPHOCYTES # BLD AUTO: 1.45 THOUSANDS/ÂΜL (ref 0.6–4.47)
LYMPHOCYTES NFR BLD AUTO: 16 % (ref 14–44)
MCH RBC QN AUTO: 25.9 PG (ref 26.8–34.3)
MCHC RBC AUTO-ENTMCNC: 30.7 G/DL (ref 31.4–37.4)
MCV RBC AUTO: 84 FL (ref 82–98)
MONOCYTES # BLD AUTO: 0.48 THOUSAND/ÂΜL (ref 0.17–1.22)
MONOCYTES NFR BLD AUTO: 5 % (ref 4–12)
NEUTROPHILS # BLD AUTO: 6.85 THOUSANDS/ÂΜL (ref 1.85–7.62)
NEUTS SEG NFR BLD AUTO: 77 % (ref 43–75)
NRBC BLD AUTO-RTO: 0 /100 WBCS
PLATELET # BLD AUTO: 273 THOUSANDS/UL (ref 149–390)
PMV BLD AUTO: 11.3 FL (ref 8.9–12.7)
PROTHROMBIN TIME: 24.4 SECONDS (ref 12.3–15)
RBC # BLD AUTO: 5.25 MILLION/UL (ref 3.88–5.62)
TIBC SERPL-MCNC: 291 UG/DL (ref 250–450)
UIBC SERPL-MCNC: 263 UG/DL (ref 155–355)
WBC # BLD AUTO: 9.01 THOUSAND/UL (ref 4.31–10.16)

## 2024-08-22 PROCEDURE — 83540 ASSAY OF IRON: CPT

## 2024-08-22 PROCEDURE — 36415 COLL VENOUS BLD VENIPUNCTURE: CPT

## 2024-08-22 PROCEDURE — 82728 ASSAY OF FERRITIN: CPT

## 2024-08-22 PROCEDURE — 99215 OFFICE O/P EST HI 40 MIN: CPT | Performed by: SURGERY

## 2024-08-22 PROCEDURE — 85610 PROTHROMBIN TIME: CPT

## 2024-08-22 PROCEDURE — 99214 OFFICE O/P EST MOD 30 MIN: CPT | Performed by: STUDENT IN AN ORGANIZED HEALTH CARE EDUCATION/TRAINING PROGRAM

## 2024-08-22 PROCEDURE — 85025 COMPLETE CBC W/AUTO DIFF WBC: CPT

## 2024-08-22 PROCEDURE — 83550 IRON BINDING TEST: CPT

## 2024-08-22 RX ORDER — LIDOCAINE 40 MG/G
CREAM TOPICAL ONCE
Status: COMPLETED | OUTPATIENT
Start: 2024-08-22 | End: 2024-08-22

## 2024-08-22 RX ADMIN — LIDOCAINE 1 APPLICATION: 40 CREAM TOPICAL at 10:20

## 2024-08-22 NOTE — PROGRESS NOTES
Wound Procedure Treatment    Performed by: Nadege Pinzon RN  Authorized by: David Frye MD    Associated wounds:   Wound 08/15/24 Diabetic Ulcer Heel Left  Wound 08/15/24 Diabetic Ulcer Plantar Left;Lateral  Wound 08/15/24 Diabetic Ulcer Toe D2, second Anterior;Left  Wound cleansed with:  NSS  Applied primary dressing:  Silicone bordered foam, Gelling fiber and Silver  Dressing secured with:  Ender and Tape  Comments:  Aquacel rope to all wound beds, u-shape offloading pad to lateral foot wound, bordered foam pad to cover heel and lateral foot wound, rolled gauze and tape to cover toe wound

## 2024-08-22 NOTE — PROGRESS NOTES
Ambulatory Visit  Name: Thomas Horne      : 1945      MRN: [id number]  Encounter Provider: Vasquez Clark MD  Encounter Date: 2024   Encounter department: [facility]    Assessment & Plan   1. Peripheral artery disease (HCC)  Assessment & Plan:  Left lower extremity critical limb threatening ischemia with tissue loss and diabetic foot ulcer.  Previously attempted endovascular intervention with long segment popliteal artery occlusion.  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  Plan for above-knee to below-knee popliteal artery bypass with PTFE and vein cuff.  Possible CryoVein.      History of Present Illness     Thomas Horne is a 79 y.o. 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  The patient has previously been treated for a right heel diabetic foot ulcer with a CryoVein bypass that was able to heal his wound however subsequently occluded.  The patient remains with interval visits to podiatry however has  suffered an additional wound.  His occlusion was not amenable to endovascular treatment and he will undergo a bypass in the coming weeks for treatment of his tissue loss.  The patient and his daughter at bedside are informed and in agreement with the procedure.    Patient is at the office to review NILS & saraam s/p R fem-pop bypass. Patient presents today with L heel ulcer/ L 2nd toe wound.    Review of Systems   Constitutional: Negative.    HENT: Negative.     Eyes: Negative.    Respiratory: Negative.     Cardiovascular: Negative.    Gastrointestinal: Negative.    Endocrine: Negative.    Genitourinary: Negative.    Musculoskeletal: Negative.    Skin:  Positive for wound.   Allergic/Immunologic: Negative.    Neurological: Negative.    Hematological: Negative.    Psychiatric/Behavioral: Negative.    "      Objective     /73 (BP Location: Left arm, Patient Position: Sitting, Cuff Size: Standard)   Pulse 73   Ht 6' 2\" (1.88 m)   Wt 105 kg (231 lb)   BMI 29.66 kg/m²     Physical Exam  Vitals and nursing note reviewed.   Constitutional:       General: He is not in acute distress.     Appearance: He is well-developed.   HENT:      Head: Normocephalic and atraumatic.   Eyes:      Conjunctiva/sclera: Conjunctivae normal.   Cardiovascular:      Rate and Rhythm: Normal rate and regular rhythm.      Heart sounds: No murmur heard.  Pulmonary:      Effort: Pulmonary effort is normal. No respiratory distress.      Breath sounds: Normal breath sounds.   Abdominal:      Palpations: Abdomen is soft.      Tenderness: There is no abdominal tenderness.   Musculoskeletal:         General: No swelling.      Cervical back: Neck supple.   Skin:     General: Skin is warm and dry.      Capillary Refill: Capillary refill takes less than 2 seconds.   Neurological:      Mental Status: He is alert.   Psychiatric:         Mood and Affect: Mood normal.       Administrative Statements       Operative Scheduling Information:    Hospital:  [facility]    Physician:  Me    Surgery: LLE SFA to BK popliteal bypass with PTFE    Urgency:  Standard    Level:  Level 3: Outpatients to be scheduled for elective surgery than can be delayed up to 4 weeks without reasonable expectation of detriment to patient    Case Length:  4 hours    Post-op Bed:  Stepdown    OR Table:  Standard    Equipment Needs:  Product/Implant: 6mm ringed PTFE conduit    Medication Instructions:  Aspirin:   Continue (do not hold)  Coumadin:  Hold for 5 days prior to procedure    Hydration:  No      "

## 2024-08-22 NOTE — PROGRESS NOTES
Patient ID: Thomas Horne is a 79 y.o. male Date of Birth 1945     Chief Complaint  Chief Complaint   Patient presents with    Follow Up Wound Care Visit     Left foot and ankle wounds       Allergies  Lisinopril    Assessment:     Diagnoses and all orders for this visit:    Diabetic ulcer of left foot associated with type 2 diabetes mellitus, with fat layer exposed, unspecified part of foot (HCC)  -     lidocaine (LMX) 4 % cream  -     Wound cleansing and dressings; Future  -     Wound Procedure Treatment    Type 2 diabetes mellitus with diabetic peripheral angiopathy without gangrene, with long-term current use of insulin (HCC)  -     lidocaine (LMX) 4 % cream  -     Wound cleansing and dressings; Future  -     Wound Procedure Treatment    Peripheral artery disease (HCC)  -     lidocaine (LMX) 4 % cream  -     Wound cleansing and dressings; Future  -     Wound Procedure Treatment            Plan:   It was a pleasure to see Thomas Horne for wound care follow up today  Wounds debrided with saline and gauze only today  Wounds are not improving   Continue plan of care as noted below with bordered foam to heel and lateral foot wound, Aquacel Ag rope to toe wound.  Wound to be cleansed with Dakin solution sent to pharmacy at last visit.  Patient did not yet receive this from the pharmacy, and I advised him to call them back.  Unfortunately patient's poor ABIs preclude total contact casting and also make him an eligible for hyperbaric oxygen treatment for DFU.  Popliteal bypass planned for September 17.  Patient ready scheduled in the OR.  Will be able to be more aggressive with overall wound care and offloading after this procedure  Patient advised that due to arterial insufficiency he is high risk for bebeto necrosis of the wounds and amputation and limb loss.  Understands that due to poor arterial supply we have limited tools, and he will need to be vigilant about glycemic control, offloading.  He should have a low  threshold to call our office for any concerns or present himself to the ER.  No signs or symptoms of infection today. Patient understands that if any signs of infection start (such as increased redness, drainage, pain, fever, chills, diaphoresis), they should call our office or proceed to the ER or Urgent Care.  Patient should continue a high protein diet to facilitate wound healing  Patient is advised to not submerge wound or leave wound open to air.  Follow up in 2 weeks  Given the multi-factorial nature of wound care, additional time was taken to review patient's treatment plan with other specialties and most recent pertinent lab work and imaging.   All plans of care discussed with patient at bedside who verbalized understanding with treatment plan.    Wound 08/15/24 Diabetic Ulcer Heel Left (Active)   Enter Sumner score: Sumner Grade 3: Deep abscess, OM, or joint sepsis 08/22/24 1028   Wound Image Images linked 08/22/24 1028   Wound Description Yellow;White;Slough;Pink 08/22/24 1028   Clara-wound Assessment Fragile 08/22/24 1028   Wound Length (cm) 1.2 cm 08/22/24 1028   Wound Width (cm) 1.8 cm 08/22/24 1028   Wound Depth (cm) 0.1 cm 08/22/24 1028   Wound Surface Area (cm^2) 2.16 cm^2 08/22/24 1028   Wound Volume (cm^3) 0.216 cm^3 08/22/24 1028   Calculated Wound Volume (cm^3) 0.22 cm^3 08/22/24 1028   Change in Wound Size % -22.22 08/22/24 1028   Drainage Amount Moderate 08/22/24 1028   Drainage Description Serosanguineous;Yellow 08/22/24 1028   Non-staged Wound Description Full thickness 08/22/24 1028   Dressing Status Intact 08/22/24 1028       Wound 08/15/24 Diabetic Ulcer Plantar Left;Lateral (Active)   Enter Sumner score: Sumner Grade 2: Deep ulcer extended to ligament, tendon, joint capsule, bone, or deep fascia without abscess or osteomyelitis (OM) 08/22/24 1028   Wound Image Images linked 08/22/24 1028   Wound Description Yellow;Pink;Slough;Pale 08/22/24 1028   Clara-wound Assessment Pink 08/22/24 1028    Wound Length (cm) 1.1 cm 08/22/24 1028   Wound Width (cm) 1 cm 08/22/24 1028   Wound Depth (cm) 0.1 cm 08/22/24 1028   Wound Surface Area (cm^2) 1.1 cm^2 08/22/24 1028   Wound Volume (cm^3) 0.11 cm^3 08/22/24 1028   Calculated Wound Volume (cm^3) 0.11 cm^3 08/22/24 1028   Change in Wound Size % -37.5 08/22/24 1028   Drainage Amount Moderate 08/22/24 1028   Drainage Description Serosanguineous;Yellow 08/22/24 1028   Non-staged Wound Description Full thickness 08/22/24 1028   Dressing Status Intact 08/22/24 1028       Wound 08/15/24 Diabetic Ulcer Toe D2, second Anterior;Left (Active)   Enter Sumner score: Sumner Grade 3: Deep abscess, OM, or joint sepsis 08/22/24 1027   Wound Image Images linked 08/22/24 1027   Wound Description Yellow;White;Slough;Pink;Black 08/22/24 1027   Clara-wound Assessment Pink 08/22/24 1027   Wound Length (cm) 1.9 cm 08/22/24 1027   Wound Width (cm) 1 cm 08/22/24 1027   Wound Depth (cm) 0.2 cm 08/22/24 1027   Wound Surface Area (cm^2) 1.9 cm^2 08/22/24 1027   Wound Volume (cm^3) 0.38 cm^3 08/22/24 1027   Calculated Wound Volume (cm^3) 0.38 cm^3 08/22/24 1027   Change in Wound Size % 9.52 08/22/24 1027   Drainage Amount Moderate 08/22/24 1027   Drainage Description Serosanguineous;Yellow 08/22/24 1027   Non-staged Wound Description Full thickness 08/22/24 1027   Dressing Status Intact 08/22/24 1027       Wound 08/15/24 Diabetic Ulcer Heel Left (Active)   Date First Assessed/Time First Assessed: 08/15/24 0943   Primary Wound Type: Diabetic Ulcer  Location: Heel  Wound Location Orientation: Left       Wound 08/15/24 Diabetic Ulcer Plantar Left;Lateral (Active)   Date First Assessed/Time First Assessed: 08/15/24 0943   Primary Wound Type: Diabetic Ulcer  Location: Plantar  Wound Location Orientation: Left;Lateral       Wound 08/15/24 Diabetic Ulcer Toe D2, second Anterior;Left (Active)   Date First Assessed/Time First Assessed: 08/15/24 0943   Primary Wound Type: Diabetic Ulcer  Location: Toe  D2, second  Wound Location Orientation: Anterior;Left       [REMOVED] Wound 12/21/23 Diabetic Ulcer Ankle Posterior;Right (Removed)   Resolved Date: 08/15/24  Date First Assessed/Time First Assessed: 12/21/23 0954   Primary Wound Type: Diabetic Ulcer  Location: Ankle  Wound Location Orientation: Posterior;Right  Wound Description (Comments): MACDONALD 2  Wound Outcome: Unknown (No long...       [REMOVED] Wound 08/07/24 Groin Left (Removed)   Resolved Date: 08/22/24  Date First Assessed/Time First Assessed: 08/07/24 0927   Location: Groin  Wound Location Orientation: Left  Incision's 1st Dressing: ADHESIVE SKIN HIGH VISCOSITY EXOFIN PRECISION PEN (x0)  Wound Outcome: Unknown (No longer pre...       Subjective:      .    8/22/24: Patient having difficulty coordinating a date for [facility] to come as he only wants to be seen Saturday.  In the meantime his daughter is taking care of local wound dressing changes.  No symptoms of infection today including fever chills diaphoresis.  Does report more pain when he is weightbearing on the foot     8/15/24: Consult - Thomas is a pleasant 79-year-old male with a past medical history of hypertension, coronary artery disease s/p CABG, peripheral arterial disease with history of intervention, HfPEF G2DD, type 2 diabetes mellitus with most recent A1c 7.9% 4 months ago, history of DVT, CKD stage IIIb, and prior tobacco use here today for wound care consult.  Patient is known to wound management for treatment of diabetic foot ulcers which have healed in the past.  He is referred today by podiatrist Dr. Sundar Arango.  Per chart review patient was being followed for 3 open wounds of the left foot.  His most recent appointment on 8 August patient also complained of increasing rest pain throughout the leg.  He was given short prescription of Percocet as well as doxycycline and given stat referral for IR intervention.  Also given referral to wound management that day given deterioration of the  wounds.  Unfortunately per operative notes, IR was unable to revascularize the left lower extremity.  Thomas is scheduled for bypass on September 17.  He was also given referral for interventional spine and pain to take over management of diabetic neuropathy.  Pain physician gave patient weaning protocol for gabapentin with plan to rotate to Lyrica.  Presents without his Aircast today.  Denies any symptoms of infection including fever chills diaphoresis.  States that his biggest complaint is that of pain.     LEADs 7/29/24  RIGHT LOWER LIMB:  Evaluation shows severe lower extremity arterial occlusive disease with a  50-75% stenosis in the deep femoral artery. There is also tibio-peroneal  arterial occlusive disease. The femoral to popliteal artery bypass graft could  not be identified suggesting occlusion.  Ankle/Brachial index: 0.48 and in the ischemic range. Prior  0.73.  PVR/ PPG tracings are dampened.  Metatarsal pressure: 80 mm Hg,  and prior non-compressible.  Great toe pressure of 20 mm Hg, below the healing range for a diabetic. Prior  31 mmHg.     LEFT LOWER LIMB:  Ankle/Brachial index: 0.48 which is in the ischemic disease category. (Prior:  0.38)  PVR/ PPG tracings are dampened.  Metatarsal pressure was not obtained secondary to severely diminished PPG  signal. Prior 38 mm Hg.  Great toe pressure was also not obtained. Prior 28 mm Hg,     Compared to previous study on 6-21-24 the occluded fem popliteal bypass graft  is a new finding.     XR left foot 7/10/24: No acute osseous abnormality. Moderate-sized calcaneal enthesoph             The following portions of the patient's history were reviewed and updated as appropriate: allergies, current medications, past family history, past medical history, past social history, past surgical history, and problem list.    Review of Systems   Constitutional:  Negative for chills, diaphoresis and fever.   Skin:  Positive for wound.   All other systems reviewed and are  negative.        Objective:       Wound 08/15/24 Diabetic Ulcer Heel Left (Active)   Enter Sumner score: Sumner Grade 3: Deep abscess, OM, or joint sepsis 08/22/24 1028   Wound Image Images linked 08/22/24 1028   Wound Description Yellow;White;Slough;Pink 08/22/24 1028   Clara-wound Assessment Fragile 08/22/24 1028   Wound Length (cm) 1.2 cm 08/22/24 1028   Wound Width (cm) 1.8 cm 08/22/24 1028   Wound Depth (cm) 0.1 cm 08/22/24 1028   Wound Surface Area (cm^2) 2.16 cm^2 08/22/24 1028   Wound Volume (cm^3) 0.216 cm^3 08/22/24 1028   Calculated Wound Volume (cm^3) 0.22 cm^3 08/22/24 1028   Change in Wound Size % -22.22 08/22/24 1028   Drainage Amount Moderate 08/22/24 1028   Drainage Description Serosanguineous;Yellow 08/22/24 1028   Non-staged Wound Description Full thickness 08/22/24 1028   Dressing Status Intact 08/22/24 1028       Wound 08/15/24 Diabetic Ulcer Plantar Left;Lateral (Active)   Enter Sumner score: Sumner Grade 2: Deep ulcer extended to ligament, tendon, joint capsule, bone, or deep fascia without abscess or osteomyelitis (OM) 08/22/24 1028   Wound Image Images linked 08/22/24 1028   Wound Description Yellow;Pink;Slough;Pale 08/22/24 1028   Clara-wound Assessment Pink 08/22/24 1028   Wound Length (cm) 1.1 cm 08/22/24 1028   Wound Width (cm) 1 cm 08/22/24 1028   Wound Depth (cm) 0.1 cm 08/22/24 1028   Wound Surface Area (cm^2) 1.1 cm^2 08/22/24 1028   Wound Volume (cm^3) 0.11 cm^3 08/22/24 1028   Calculated Wound Volume (cm^3) 0.11 cm^3 08/22/24 1028   Change in Wound Size % -37.5 08/22/24 1028   Drainage Amount Moderate 08/22/24 1028   Drainage Description Serosanguineous;Yellow 08/22/24 1028   Non-staged Wound Description Full thickness 08/22/24 1028   Dressing Status Intact 08/22/24 1028       Wound 08/15/24 Diabetic Ulcer Toe D2, second Anterior;Left (Active)   Enter Sumner score: Sumner Grade 3: Deep abscess, OM, or joint sepsis 08/22/24 1027   Wound Image Images linked 08/22/24 1027   Wound  Description Yellow;White;Slough;Pink;Black 08/22/24 1027   Clara-wound Assessment Pink 08/22/24 1027   Wound Length (cm) 1.9 cm 08/22/24 1027   Wound Width (cm) 1 cm 08/22/24 1027   Wound Depth (cm) 0.2 cm 08/22/24 1027   Wound Surface Area (cm^2) 1.9 cm^2 08/22/24 1027   Wound Volume (cm^3) 0.38 cm^3 08/22/24 1027   Calculated Wound Volume (cm^3) 0.38 cm^3 08/22/24 1027   Change in Wound Size % 9.52 08/22/24 1027   Drainage Amount Moderate 08/22/24 1027   Drainage Description Serosanguineous;Yellow 08/22/24 1027   Non-staged Wound Description Full thickness 08/22/24 1027   Dressing Status Intact 08/22/24 1027       /58   Pulse (!) 50   Temp (!) 95.7 °F (35.4 °C)   Resp 18     Physical Exam  Vitals reviewed.   Constitutional:       Appearance: Normal appearance.   HENT:      Head: Normocephalic and atraumatic.   Eyes:      Extraocular Movements: Extraocular movements intact.   Pulmonary:      Effort: Pulmonary effort is normal.   Musculoskeletal:      Cervical back: Neck supple.   Skin:     Comments: Second toe wound lateral left foot wound and left plantar heel wound grossly unchanged from last exam.  Still with fibrin deposition and slough in the wound bed.  Not probing to bone.  No overt signs of infection   Neurological:      Mental Status: He is alert.   Psychiatric:         Mood and Affect: Mood normal.                     Wound Instructions:  Orders Placed This Encounter   Procedures    Wound cleansing and dressings     Left foot wounds:     Wash your hands with soap and water. Remove old dressing, discard into plastic bag and place in trash. Cleanse the wound with dakins moistened gauze (sent to pharmacy please ) prior to applying a clean dressing. Do not use tissue or cotton balls. Do not scrub the wound. Pat dry using gauze.     Shower yes if able to keep wound dry     Apply offloading pad in u-shape to lateral foot wound only   Apply aquacel ag rope to the open wounds.   Cover and secure  with bordered foam dressing.     Change dressing 3 times weekly     The above was completed today at the wound center     Left 2nd toe wound:     Wash your hands with soap and water. Remove old dressing, discard into plastic bag and place in trash. Cleanse the wound with dakins moistened gauze (sent to pharmacy please ) prior to applying a clean dressing. Do not use tissue or cotton balls. Do not scrub the wound. Pat dry using gauze.     Shower yes if able to keep wound dry     Apply aquacel ag rope to the open wounds.   Cover with gauze   Secure with rolled gauze and tape     Change dressing 3 times weekly     The above was completed today at the wound center Referral sent to [facility] (awaiting acceptance per patient, unable to coordinate SOC)    Off-loading Instructions: Wear off-loading device as directed by your physician (Air cast as provided by Dr. Arango). Put on immediately when rising in the morning and remove when going to bed. Limit walking to only necessity.     Protein: Eat protein with each meal to promote healing. Examples of protein are fish, meat, chicken, nuts, peanut butter, eggs, lentils, edamame or a protein shake.     Wound infection:  If you have signs of infection please call the wound center.  If the wound center is closed- please go to the Emergency department.  Some signs of infection:  fever, chills, increased redness, red streaks, increase in pain, increased drainage.  Drainage with an odor, Change in drainage color: white/milky/green/tan/yellow,  an increase in swelling, chest pain and/or shortness of breath.     Standing Status:   Future     Standing Expiration Date:   8/29/2024    Wound Procedure Treatment     This order was created via procedure documentation        Diagnosis ICD-10-CM Associated Orders   1. Diabetic ulcer of left foot associated with type 2 diabetes mellitus, with fat layer exposed, unspecified part of foot ([facility])  E11.621 lidocaine (LMX) 4 % cream  "   L97.522 Wound cleansing and dressings     Wound Procedure Treatment      2. Type 2 diabetes mellitus with diabetic peripheral angiopathy without gangrene, with long-term current use of insulin ([facility])  E11.51 lidocaine (LMX) 4 % cream    Z79.4 Wound cleansing and dressings     Wound Procedure Treatment      3. Peripheral artery disease ([facility])  I73.9 lidocaine (LMX) 4 % cream     Wound cleansing and dressings     Wound Procedure Treatment          --  David Frye MD    \"This note has been constructed using a voice recognition system. Therefore there may be syntax, spelling, and/or grammatical errors. Occasional wrong word or \"sound alike\" substitutions may have occurred due to the inherent limitations of voice recognition software. Read the chart carefully and recognize, using context, where substitutions have occurred. Please call if you have any questions.\"     "

## 2024-08-22 NOTE — ASSESSMENT & PLAN NOTE
Left lower extremity critical limb threatening ischemia with tissue loss and diabetic foot ulcer.  Previously attempted endovascular intervention with long segment popliteal artery occlusion.  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  Plan for above-knee to below-knee popliteal artery bypass with PTFE and vein cuff.  Possible CryoVein.

## 2024-08-22 NOTE — H&P (VIEW-ONLY)
Ambulatory Visit  Name: Thomas Horne      : 1945      MRN: [id number]  Encounter Provider: Vasquez Clark MD  Encounter Date: 2024   Encounter department: [facility] [place]    Assessment & Plan   1. Peripheral artery disease (HCC)  Assessment & Plan:  Left lower extremity critical limb threatening ischemia with tissue loss and diabetic foot ulcer.  Previously attempted endovascular intervention with long segment popliteal artery occlusion.  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  Plan for above-knee to below-knee popliteal artery bypass with PTFE and vein cuff.  Possible CryoVein.      History of Present Illness     Thomas Horne is a 79 y.o. 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  The patient has previously been treated for a right heel diabetic foot ulcer with a CryoVein bypass that was able to heal his wound however subsequently occluded.  The patient remains with interval visits to podiatry however has  suffered an additional wound.  His occlusion was not amenable to endovascular treatment and he will undergo a bypass in the coming weeks for treatment of his tissue loss.  The patient and his daughter at bedside are informed and in agreement with the procedure.    Patient is at the office to review NILS & saraam s/p R fem-pop bypass. Patient presents today with L heel ulcer/ L 2nd toe wound.    Review of Systems   Constitutional: Negative.    HENT: Negative.     Eyes: Negative.    Respiratory: Negative.     Cardiovascular: Negative.    Gastrointestinal: Negative.    Endocrine: Negative.    Genitourinary: Negative.    Musculoskeletal: Negative.    Skin:  Positive for wound.   Allergic/Immunologic: Negative.    Neurological: Negative.    Hematological: Negative.    Psychiatric/Behavioral: Negative.    "      Objective     /73 (BP Location: Left arm, Patient Position: Sitting, Cuff Size: Standard)   Pulse 73   Ht 6' 2\" (1.88 m)   Wt 105 kg (231 lb)   BMI 29.66 kg/m²     Physical Exam  Vitals and nursing note reviewed.   Constitutional:       General: He is not in acute distress.     Appearance: He is well-developed.   HENT:      Head: Normocephalic and atraumatic.   Eyes:      Conjunctiva/sclera: Conjunctivae normal.   Cardiovascular:      Rate and Rhythm: Normal rate and regular rhythm.      Heart sounds: No murmur heard.  Pulmonary:      Effort: Pulmonary effort is normal. No respiratory distress.      Breath sounds: Normal breath sounds.   Abdominal:      Palpations: Abdomen is soft.      Tenderness: There is no abdominal tenderness.   Musculoskeletal:         General: No swelling.      Cervical back: Neck supple.   Skin:     General: Skin is warm and dry.      Capillary Refill: Capillary refill takes less than 2 seconds.   Neurological:      Mental Status: He is alert.   Psychiatric:         Mood and Affect: Mood normal.       Administrative Statements       Operative Scheduling Information:    Hospital:  [facility]    Physician:  Me    Surgery: LLE SFA to BK popliteal bypass with PTFE    Urgency:  Standard    Level:  Level 3: Outpatients to be scheduled for elective surgery than can be delayed up to 4 weeks without reasonable expectation of detriment to patient    Case Length:  4 hours    Post-op Bed:  Stepdown    OR Table:  Standard    Equipment Needs:  Product/Implant: 6mm ringed PTFE conduit    Medication Instructions:  Aspirin:   Continue (do not hold)  Coumadin:  Hold for 5 days prior to procedure    Hydration:  No      "

## 2024-08-22 NOTE — PATIENT INSTRUCTIONS
Orders Placed This Encounter   Procedures    Wound cleansing and dressings     Left foot wounds:     Wash your hands with soap and water. Remove old dressing, discard into plastic bag and place in trash. Cleanse the wound with dakins moistened gauze (sent to pharmacy please ) prior to applying a clean dressing. Do not use tissue or cotton balls. Do not scrub the wound. Pat dry using gauze.     Shower yes if able to keep wound dry     Apply offloading pad in u-shape to lateral foot wound only   Apply aquacel ag rope to the open wounds.   Cover and secure with bordered foam dressing.     Change dressing 3 times weekly     The above was completed today at the wound center     Left 2nd toe wound:     Wash your hands with soap and water. Remove old dressing, discard into plastic bag and place in trash. Cleanse the wound with dakins moistened gauze (sent to pharmacy please ) prior to applying a clean dressing. Do not use tissue or cotton balls. Do not scrub the wound. Pat dry using gauze.     Shower yes if able to keep wound dry     Apply aquacel ag rope to the open wounds.   Cover with gauze   Secure with rolled gauze and tape     Change dressing 3 times weekly     The above was completed today at the wound center Referral sent to [facility] (awaiting acceptance per patient, unable to coordinate SOC)    Off-loading Instructions: Wear off-loading device as directed by your physician (Air cast as provided by Dr. Arango). Put on immediately when rising in the morning and remove when going to bed. Limit walking to only necessity.     Protein: Eat protein with each meal to promote healing. Examples of protein are fish, meat, chicken, nuts, peanut butter, eggs, lentils, edamame or a protein shake.     Wound infection:  If you have signs of infection please call the wound center.  If the wound center is closed- please go to the Emergency department.  Some signs of infection:  fever, chills, increased  redness, red streaks, increase in pain, increased drainage.  Drainage with an odor, Change in drainage color: white/milky/green/tan/yellow,  an increase in swelling, chest pain and/or shortness of breath.     Standing Status:   Future     Standing Expiration Date:   8/29/2024    Wound Procedure Treatment     This order was created via procedure documentation

## 2024-08-22 NOTE — TELEPHONE ENCOUNTER
Spoke with patient regarding labs needed to be drawn prior to appointment.  Indicated the scripts are in the system, they are non-fasting and patient can go to any [facility] facility to have the labs drawn.  Patient verbalized understanding.

## 2024-08-22 NOTE — LETTER
2024     Frank Lombardi, DO  [address]  [address]  [facility] [address]    Patient: Thomas Horne   YOB: 1945   Date of Visit: 2024       Dear Dr. Lombardi:    Thank you for referring Thomas Horne to me for evaluation. Below are my notes for this consultation.    If you have questions, please do not hesitate to call me. I look forward to following your patient along with you.         Sincerely,        Vasquez Clark MD        CC: Thomas Clark MD  2024  5:03 PM  Sign when Signing Visit  Ambulatory Visit  Name: Thomas Horne      : 1945      MRN: [id number]  Encounter Provider: Vasquez Clark MD  Encounter Date: 2024   Enc[facility] department: [facility]    Assessment & Plan  1. Peripheral artery disease (HCC)  Assessment & Plan:  Left lower extremity critical limb threatening ischemia with tissue loss and diabetic foot ulcer.  Previously attempted endovascular intervention with long segment popliteal artery occlusion.  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  Plan for above-knee to below-knee popliteal artery bypass with PTFE and vein cuff.  Possible CryoVein.      History of Present Illness    Thomas Horne is a 79 y.o. 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  The patient has previously been treated for a right heel diabetic foot ulcer with a CryoVein bypass that was able to heal his wound however subsequently occluded.  The patient remains with interval visits to podiatry however has  suffered an additional wound.  His occlusion was not amenable to endovascular treatment and he will undergo a bypass in the coming weeks for treatment of his tissue loss.  The patient and his daughter at bedside are informed and in  "agreement with the procedure.    Patient is at the office to review NILS & agram s/p R fem-pop bypass. Patient presents today with L heel ulcer/ L 2nd toe wound.    Review of Systems   Constitutional: Negative.    HENT: Negative.     Eyes: Negative.    Respiratory: Negative.     Cardiovascular: Negative.    Gastrointestinal: Negative.    Endocrine: Negative.    Genitourinary: Negative.    Musculoskeletal: Negative.    Skin:  Positive for wound.   Allergic/Immunologic: Negative.    Neurological: Negative.    Hematological: Negative.    Psychiatric/Behavioral: Negative.         Objective    /73 (BP Location: Left arm, Patient Position: Sitting, Cuff Size: Standard)   Pulse 73   Ht 6' 2\" (1.88 m)   Wt 105 kg (231 lb)   BMI 29.66 kg/m²     Physical Exam  Vitals and nursing note reviewed.   Constitutional:       General: He is not in acute distress.     Appearance: He is well-developed.   HENT:      Head: Normocephalic and atraumatic.   Eyes:      Conjunctiva/sclera: Conjunctivae normal.   Cardiovascular:      Rate and Rhythm: Normal rate and regular rhythm.      Heart sounds: No murmur heard.  Pulmonary:      Effort: Pulmonary effort is normal. No respiratory distress.      Breath sounds: Normal breath sounds.   Abdominal:      Palpations: Abdomen is soft.      Tenderness: There is no abdominal tenderness.   Musculoskeletal:         General: No swelling.      Cervical back: Neck supple.   Skin:     General: Skin is warm and dry.      Capillary Refill: Capillary refill takes less than 2 seconds.   Neurological:      Mental Status: He is alert.   Psychiatric:         Mood and Affect: Mood normal.       Administrative Statements      Operative Scheduling Information:    Hospital:  [facility]    Physician:  Me    Surgery: LLE SFA to BK popliteal bypass with PTFE    Urgency:  Standard    Level:  Level 3: Outpatients to be scheduled for elective surgery than can be delayed up to 4 weeks without reasonable " expectation of detriment to patient    Case Length:  4 hours    Post-op Bed:  Stepdown    OR Table:  Standard    Equipment Needs:  Product/Implant: 6mm ringed PTFE conduit    Medication Instructions:  Aspirin:   Continue (do not hold)  Coumadin:  Hold for 5 days prior to procedure    Hydration:  No

## 2024-08-23 ENCOUNTER — TELEPHONE (OUTPATIENT)
Dept: FAMILY MEDICINE CLINIC | Facility: CLINIC | Age: 79
End: 2024-08-23

## 2024-08-23 ENCOUNTER — ANTICOAG VISIT (OUTPATIENT)
Dept: FAMILY MEDICINE CLINIC | Facility: CLINIC | Age: 79
End: 2024-08-23

## 2024-08-23 NOTE — TELEPHONE ENCOUNTER
Provider message give. Pt updated that today will be the last dose of coumadin as he is going for surgery next Friday 8/30. Please advise.

## 2024-08-23 NOTE — TELEPHONE ENCOUNTER
----- Message from APOLLO Hoff sent at 8/23/2024  8:22 AM EDT -----  Continue current dose of coumadin and repeat INR in 3 weeks. APOLLO Hoff

## 2024-08-23 NOTE — TELEPHONE ENCOUNTER
Left message on machine for a call back . Please giver provider's message and confirm patient's dosage  Betty Herman, JG

## 2024-08-26 ENCOUNTER — ANESTHESIA EVENT (OUTPATIENT)
Dept: PERIOP | Facility: HOSPITAL | Age: 79
DRG: 253 | End: 2024-08-26
Payer: COMMERCIAL

## 2024-08-26 ENCOUNTER — TELEPHONE (OUTPATIENT)
Age: 79
End: 2024-08-26

## 2024-08-26 ENCOUNTER — LAB (OUTPATIENT)
Dept: LAB | Facility: CLINIC | Age: 79
End: 2024-08-26
Payer: COMMERCIAL

## 2024-08-26 ENCOUNTER — TELEMEDICINE (OUTPATIENT)
Dept: HEMATOLOGY ONCOLOGY | Facility: MEDICAL CENTER | Age: 79
End: 2024-08-26
Payer: COMMERCIAL

## 2024-08-26 VITALS — RESPIRATION RATE: 17 BRPM | BODY MASS INDEX: 29.66 KG/M2 | HEIGHT: 74 IN

## 2024-08-26 DIAGNOSIS — M77.42 METATARSALGIA OF BOTH FEET: ICD-10-CM

## 2024-08-26 DIAGNOSIS — M77.41 METATARSALGIA OF BOTH FEET: Primary | ICD-10-CM

## 2024-08-26 DIAGNOSIS — E11.42 DIABETIC POLYNEUROPATHY ASSOCIATED WITH TYPE 2 DIABETES MELLITUS (HCC): ICD-10-CM

## 2024-08-26 DIAGNOSIS — I70.209 PERIPHERAL ARTERIOSCLEROSIS (HCC): ICD-10-CM

## 2024-08-26 DIAGNOSIS — N18.32 STAGE 3B CHRONIC KIDNEY DISEASE (HCC): ICD-10-CM

## 2024-08-26 DIAGNOSIS — M54.16 RADICULOPATHY OF LUMBAR REGION: ICD-10-CM

## 2024-08-26 DIAGNOSIS — Z86.718 HISTORY OF DVT (DEEP VEIN THROMBOSIS): ICD-10-CM

## 2024-08-26 DIAGNOSIS — E53.8 FOLATE DEFICIENCY: ICD-10-CM

## 2024-08-26 DIAGNOSIS — L97.521 DIABETIC ULCER OF TOE OF LEFT FOOT ASSOCIATED WITH TYPE 2 DIABETES MELLITUS, LIMITED TO BREAKDOWN OF SKIN (HCC): ICD-10-CM

## 2024-08-26 DIAGNOSIS — E11.621 DIABETIC ULCER OF TOE OF LEFT FOOT ASSOCIATED WITH TYPE 2 DIABETES MELLITUS, LIMITED TO BREAKDOWN OF SKIN (HCC): ICD-10-CM

## 2024-08-26 DIAGNOSIS — D50.0 IRON DEFICIENCY ANEMIA DUE TO CHRONIC BLOOD LOSS: Primary | ICD-10-CM

## 2024-08-26 DIAGNOSIS — I82.452 ACUTE DEEP VEIN THROMBOSIS (DVT) OF LEFT PERONEAL VEIN (HCC): ICD-10-CM

## 2024-08-26 DIAGNOSIS — L97.421 DIABETIC ULCER OF LEFT HEEL ASSOCIATED WITH TYPE 2 DIABETES MELLITUS, LIMITED TO BREAKDOWN OF SKIN (HCC): ICD-10-CM

## 2024-08-26 DIAGNOSIS — M77.41 METATARSALGIA OF BOTH FEET: ICD-10-CM

## 2024-08-26 DIAGNOSIS — M77.42 METATARSALGIA OF BOTH FEET: Primary | ICD-10-CM

## 2024-08-26 DIAGNOSIS — E53.8 VITAMIN B12 DEFICIENCY: ICD-10-CM

## 2024-08-26 DIAGNOSIS — E11.621 DIABETIC ULCER OF LEFT HEEL ASSOCIATED WITH TYPE 2 DIABETES MELLITUS, LIMITED TO BREAKDOWN OF SKIN (HCC): ICD-10-CM

## 2024-08-26 LAB
ANION GAP SERPL CALCULATED.3IONS-SCNC: 9 MMOL/L (ref 4–13)
BUN SERPL-MCNC: 33 MG/DL (ref 5–25)
CALCIUM SERPL-MCNC: 9.5 MG/DL (ref 8.4–10.2)
CHLORIDE SERPL-SCNC: 103 MMOL/L (ref 96–108)
CO2 SERPL-SCNC: 28 MMOL/L (ref 21–32)
CREAT SERPL-MCNC: 1.57 MG/DL (ref 0.6–1.3)
ERYTHROCYTE [DISTWIDTH] IN BLOOD BY AUTOMATED COUNT: 16.4 % (ref 11.6–15.1)
GFR SERPL CREATININE-BSD FRML MDRD: 40 ML/MIN/1.73SQ M
GLUCOSE SERPL-MCNC: 114 MG/DL (ref 65–140)
HCT VFR BLD AUTO: 43.1 % (ref 36.5–49.3)
HGB BLD-MCNC: 13.3 G/DL (ref 12–17)
INR PPP: 2.31 (ref 0.85–1.19)
MCH RBC QN AUTO: 26.1 PG (ref 26.8–34.3)
MCHC RBC AUTO-ENTMCNC: 30.9 G/DL (ref 31.4–37.4)
MCV RBC AUTO: 85 FL (ref 82–98)
PLATELET # BLD AUTO: 258 THOUSANDS/UL (ref 149–390)
PMV BLD AUTO: 12 FL (ref 8.9–12.7)
POTASSIUM SERPL-SCNC: 4.3 MMOL/L (ref 3.5–5.3)
PROTHROMBIN TIME: 25.3 SECONDS (ref 12.3–15)
RBC # BLD AUTO: 5.1 MILLION/UL (ref 3.88–5.62)
SODIUM SERPL-SCNC: 140 MMOL/L (ref 135–147)
WBC # BLD AUTO: 10.15 THOUSAND/UL (ref 4.31–10.16)

## 2024-08-26 PROCEDURE — G2012 BRIEF CHECK IN BY MD/QHP: HCPCS | Performed by: PHYSICIAN ASSISTANT

## 2024-08-26 PROCEDURE — 85027 COMPLETE CBC AUTOMATED: CPT

## 2024-08-26 PROCEDURE — 36415 COLL VENOUS BLD VENIPUNCTURE: CPT

## 2024-08-26 PROCEDURE — 85610 PROTHROMBIN TIME: CPT

## 2024-08-26 PROCEDURE — 80048 BASIC METABOLIC PNL TOTAL CA: CPT

## 2024-08-26 RX ORDER — OXYCODONE AND ACETAMINOPHEN 5; 325 MG/1; MG/1
1 TABLET ORAL EVERY 8 HOURS PRN
Qty: 15 TABLET | Refills: 0 | Status: ON HOLD | OUTPATIENT
Start: 2024-08-26 | End: 2024-08-31

## 2024-08-26 RX ORDER — DULOXETIN HYDROCHLORIDE 30 MG/1
30 CAPSULE, DELAYED RELEASE ORAL DAILY
Qty: 90 CAPSULE | Refills: 0 | Status: ON HOLD | OUTPATIENT
Start: 2024-08-26

## 2024-08-26 NOTE — TELEPHONE ENCOUNTER
Patient would like to know if he can get a refill on the Oxycodon-Acetaminophen 5-325? He would like to have some up until his next appointment.

## 2024-08-26 NOTE — TELEPHONE ENCOUNTER
Patient called to see if the prescription was sent for Oxycodone. He said the surgery is Friday. He need enough to hold him until then. He is in a lot of pain.

## 2024-08-26 NOTE — PROGRESS NOTES
[facility] [address] [facility] [id number]  VIRTUAL Hematology Ambulatory Follow-Up  Thomas Horne, 1945, [id number]  8/26/2024      Assessment/Plan:   1.  Anemia  Patient was evaluated in follow-up of anemia, likely secondary to chronic blood loss along with Stage III CKD.     Patient with iron deficiency anemia with history of GI bleeding. He had recent EGD and colonoscopy without evidence of bleeding.  Capsule endoscopy was recommended; however he has not had any recent follow-up with GI.    He was treated with Venofer 300 mg weekly x 3 from 6/11/2024 through 6/25/2024.    He had repeat lab work on 8/22/2024.  Hemoglobin improved to 13.6.  Ferritin 100 and iron saturation 10%.    In patients with iron deficiency along with chronic kidney disease we typically replete iron to keep ferritin greater than 100 and iron saturation greater than 20%.    He is currently taking oral iron 1 tablet every other day which he prefers to continue with right now.  Can consider IV iron in the future if he does not respond to oral iron or is unable to tolerate.     2. DVT  Patient with recent finding of acute nonocclusive DVT in the gastrocnemius and peroneal veins in the left lower extremity. ?Provoked by hospital stay from 4/8 through 4/11/2024 for SOB.  He also has distant history of DVT.  Details are unclear.     He was placed on Coumadin on 4/18/2024.  This is being managed by his PCP.     The patient is scheduled for follow-up in approximately 4 months time.  ____________________________________    The patient was identified by name and date of birth.  Thomas Horne  was informed that this is a telemedicine visit and that the visit is being conducted through Telephone.  My office door was closed. No one else was in the room.  He acknowledged consent and understanding of privacy and security of the video platform. The patient has agreed to  participate and understands they can discontinue the visit at any time.    Patient's location was [facility], [place].   I hold a valid medical license in the state of [place].     Goals and Barriers:    Current Goal:  Continue to monitor iron studies.  Barriers: None.      Patient's Capacity to Self Care:  Patient is able to self care.    Subjective      Chief Complaint   Patient presents with    Follow-up       History of present illness:   Patient was evaluated by telephone.    He was previously seen for evaluation of anemia.  This was felt to be related to iron deficiency along with chronic kidney disease.    He was treated with Venofer 300 mg weekly x 3 from 6/11/2024 through 6/25/2024.    He says he noticed improvement in fatigue after receiving the IV iron.    More recently he has been dealing with wounds in the right lower extremity.  He is scheduled for above-knee to below-knee popliteal artery bypass with vascular surgery later this week.    He denies any obvious bleeding.  No evidence of GI bleeding.  He has not recently followed up with GI.    Review of Systems   Constitutional:  Positive for fatigue. Negative for activity change, appetite change and fever.   HENT:  Negative for nosebleeds.    Respiratory:  Negative for cough, choking and shortness of breath.    Cardiovascular:  Negative for chest pain, palpitations and leg swelling.   Gastrointestinal:  Negative for abdominal distention, abdominal pain, anal bleeding, blood in stool, constipation, diarrhea, nausea and vomiting.   Endocrine: Negative for cold intolerance.   Genitourinary:  Negative for hematuria.   Musculoskeletal:  Positive for arthralgias. Negative for myalgias.   Skin:  Negative for color change, pallor and rash.   Allergic/Immunologic: Negative for immunocompromised state.   Neurological:  Negative for headaches.   Hematological:  Negative for adenopathy. Does not bruise/bleed easily.   All other systems reviewed and are negative.      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)     Plantar fasciitis     Sleep apnea     Could not tolerate CPAP     Past Surgical History:   Procedure Laterality Date    ABDOMINAL AORTIC ANEURYSM REPAIR      Stented    ADENOIDECTOMY      BACK SURGERY  1985    CARDIAC CATHETERIZATION Left 10/19/2022    Procedure: Cardiac Left Heart Cath;  Surgeon: Danielle Pereira MD;  Location: [facility];  Service: Cardiology    CARDIAC SURGERY  2002    3 cardiac bypass then angioplasty 7/2020    CHOLECYSTECTOMY      COLONOSCOPY  02/14/2024    CORONARY ARTERY BYPASS GRAFT      IR LOWER EXTREMITY ANGIOGRAM  11/01/2023    IR LOWER EXTREMITY ANGIOGRAM  08/07/2024    LAMINECTOMY  1990    IL BYPASS W/VEIN FEMORAL-POPLITEAL Right 11/16/2023    Procedure: BYPASS FEMORAL-POPLITEAL WITH CRYO VEIN, RIGHT FEMORAL ENDARTERECTOMY;  Surgeon: Vasquez Clark MD;  Location: [facility];  Service: Vascular    IL SLCTV CATHJ 3RD+ ORD SLCTV ABDL PEL/LXTR BRNCH Right 11/01/2023    Procedure: ARTERIOGRAM Right lower extremity arteriogram with CO2 via right groin access;  Surgeon: Vasquez Clark MD;  Location: [facility];  Service: Vascular    IL SLCTV CATHJ 3RD+ ORD SLCTV ABDL PEL/LXTR BRNCH Left 08/07/2024    Procedure: diagnostic LLE Arteriogram;  Surgeon: Vasquez Clark MD;  Location: [facility];  Service: Vascular    PROSTATE SURGERY      TONSILLECTOMY      URINARY SPHINCTER IMPLANT       Family History   Problem Relation Age of Onset    Diabetes Mother      Alcohol abuse Father     Heart disease Brother     Cancer Sister         Thyroid    Cancer Sister         Colon    Cancer Brother         Throat    Cancer Brother     Diabetes Sister     Mental illness Neg Hx      Social History     Socioeconomic History    Marital status:      Spouse name: Not on file    Number of children: 2    Years of education: Not on file    Highest education level: Some college, no degree   Occupational History    Not on file   Tobacco Use    Smoking status: Former     Current packs/day: 0.00     Average packs/day: 1.5 packs/day for 33.0 years (49.5 ttl pk-yrs)     Types: Cigarettes     Start date: 1956     Quit date:      Years since quittin.6     Passive exposure: Past    Smokeless tobacco: Never   Vaping Use    Vaping status: Never Used   Substance and Sexual Activity    Alcohol use: Yes     Alcohol/week: 10.0 standard drinks of alcohol     Types: 10 Cans of beer per week    Drug use: Never    Sexual activity: Not Currently     Partners: Female     Birth control/protection: Male Sterilization   Other Topics Concern    Not on file   Social History Narrative    Not on file     Social Determinants of Health     Financial Resource Strain: Low Risk  (10/31/2023)    Overall Financial Resource Strain (CARDIA)     Difficulty of Paying Living Expenses: Not hard at all   Food Insecurity: No Food Insecurity (2024)    Hunger Vital Sign     Worried About Running Out of Food in the Last Year: Never true     Ran Out of Food in the Last Year: Never true   Transportation Needs: No Transportation Needs (2024)    PRAPARE - Transportation     Lack of Transportation (Medical): No     Lack of Transportation (Non-Medical): No   Physical Activity: Not on file   Stress: Not on file   Social Connections: Not on file   Intimate Partner Violence: Not on file   Housing Stability: Low Risk  (2024)    Housing Stability Vital Sign     Unable to Pay for Housing in the Last Year: No      Number of Times Moved in the Last Year: 1     Homeless in the Last Year: No         Current Outpatient Medications:     amLODIPine (NORVASC) 10 mg tablet, Take 0.5 tablets (5 mg total) by mouth daily, Disp: 45 tablet, Rfl: 1    aspirin 81 mg chewable tablet, Chew 81 mg daily, Disp: , Rfl:     atorvastatin (LIPITOR) 40 mg tablet, Take 1 tablet (40 mg total) by mouth daily (Patient taking differently: Take 40 mg by mouth daily at bedtime), Disp: 90 tablet, Rfl: 1    Blood Glucose Monitoring Suppl (OneTouch Verio Reflect) w/Device KIT, Check blood sugars twice daily. Please substitute with appropriate alternative as covered by patient's insurance. Dx: E11.65, Disp: 1 kit, Rfl: 0    cloNIDine (CATAPRES) 0.1 mg tablet, take 1 tablet by mouth every 12 hours, Disp: 180 tablet, Rfl: 1    Droplet Pen Needles 32G X 4 MM MISC, USE EVERY EVENING, Disp: 100 each, Rfl: 5    DULoxetine (Cymbalta) 30 mg delayed release capsule, Take 1 capsule (30 mg total) by mouth daily, Disp: 30 capsule, Rfl: 2    Empagliflozin (Jardiance) 25 MG TABS, TAKE 1 TABLET BY MOUTH DAILY, Disp: 90 tablet, Rfl: 1    fenofibrate 160 MG tablet, TAKE 1 TABLET BY MOUTH DAILY, Disp: 100 tablet, Rfl: 1    glimepiride (AMARYL) 2 mg tablet, Take 1 tablet (2 mg total) by mouth 2 (two) times a day, Disp: 180 tablet, Rfl: 3    glucose blood (OneTouch Verio) test strip, TEST TWICE A DAY, Disp: 200 strip, Rfl: 5    insulin glargine (LANTUS) 100 units/mL subcutaneous injection, Inject 15 Units under the skin daily at bedtime (Patient taking differently: Inject 15 Units under the skin if needed), Disp: 10 mL, Rfl: 0    isosorbide mononitrate (IMDUR) 30 mg 24 hr tablet, Take 3 tablets (90 mg total) by mouth daily, Disp: 270 tablet, Rfl: 1    ketoconazole (NIZORAL) 2 % cream, Apply topically daily, Disp: 60 g, Rfl: 1    metFORMIN (GLUCOPHAGE) 500 mg tablet, Take 1 tablet (500 mg total) by mouth 2 (two) times a day with meals, Disp: 180 tablet, Rfl: 1    metoprolol  tartrate (LOPRESSOR) 50 mg tablet, Take 0.5 tablets (25 mg total) by mouth every 12 (twelve) hours, Disp: 90 tablet, Rfl: 1    Multiple Vitamins-Minerals (MULTIVITAMIN MEN 50+ PO), Take by mouth daily, Disp: , Rfl:     nitroglycerin (NITROSTAT) 0.4 mg SL tablet, Place 1 tablet (0.4 mg total) under the tongue every 5 (five) minutes as needed for chest pain, Disp: 30 tablet, Rfl: 0    Omega-3 Fatty Acids (fish oil) 1,000 mg, Take 4,000 mg by mouth 2 (two) times a day, Disp: , Rfl:     ondansetron (ZOFRAN) 4 mg tablet, Take 4 mg by mouth every 8 (eight) hours as needed for nausea or vomiting (Patient not taking: Reported on 8/15/2024), Disp: , Rfl:     OneTouch Delica Lancets 33G MISC, Check blood sugars twice daily. Please substitute with appropriate alternative as covered by patient's insurance. Dx: E11.65, Disp: 200 each, Rfl: 3    pantoprazole (PROTONIX) 40 mg tablet, Take 1 tablet (40 mg total) by mouth daily, Disp: 90 tablet, Rfl: 1    pentoxifylline (TRENtal) 400 mg ER tablet, Take 1 tablet (400 mg total) by mouth 3 (three) times a day with meals, Disp: 90 tablet, Rfl: 2    pregabalin (LYRICA) 100 mg capsule, Take 1 capsule (100 mg total) by mouth 3 (three) times a day, Disp: 90 capsule, Rfl: 0    ranolazine (RANEXA) 1000 MG SR tablet, Take 1 tablet (1,000 mg total) by mouth 2 (two) times a day, Disp: 180 tablet, Rfl: 3    sitaGLIPtin (Januvia) 100 mg tablet, TAKE 1 TABLET BY MOUTH DAILY, Disp: 100 tablet, Rfl: 1    sodium hypochlorite (DAKIN'S QUARTER-STRENGTH), Irrigate with 1 Application as directed daily, Disp: 473 mL, Rfl: 1    torsemide (DEMADEX) 20 mg tablet, TAKE 1 TABLET BY MOUTH DAILY, Disp: 90 tablet, Rfl: 1    warfarin (Coumadin) 2.5 mg tablet, Qd and as directed, Disp: 90 tablet, Rfl: 1  Allergies   Allergen Reactions    Lisinopril Rash and Lip Swelling       Objective    Result Review  Labs:   Latest Reference Range & Units 08/22/24 12:17   Iron 50 - 212 ug/dL 28 (L)   FERRITIN 24 - 336 ng/mL 100    Iron Saturation 15 - 50 % 10 (L)   TIBC 250 - 450 ug/dL 291   UIBC 155 - 355 ug/dL 263   WBC 4.31 - 10.16 Thousand/uL 9.01   RBC 3.88 - 5.62 Million/uL 5.25   Hemoglobin 12.0 - 17.0 g/dL 13.6   Hematocrit 36.5 - 49.3 % 44.3   MCV 82 - 98 fL 84   MCH 26.8 - 34.3 pg 25.9 (L)   MCHC 31.4 - 37.4 g/dL 30.7 (L)   RDW 11.6 - 15.1 % 16.4 (H)   Platelet Count 149 - 390 Thousands/uL 273   MPV 8.9 - 12.7 fL 11.3   nRBC /100 WBCs 0   Segmented % 43 - 75 % 77 (H)   Lymphocytes % 14 - 44 % 16   Monocytes % 4 - 12 % 5   Eosinophils % 0 - 6 % 2   Basophils % 0 - 1 % 0   Immature Grans % 0 - 2 % 0   Absolute Immature Grans 0.00 - 0.20 Thousand/uL 0.03   Absolute Neutrophils 1.85 - 7.62 Thousands/µL 6.85   Absolute Lymphocytes 0.60 - 4.47 Thousands/µL 1.45   Absolute Monocytes 0.17 - 1.22 Thousand/µL 0.48   Absolute Eosinophils 0.00 - 0.61 Thousand/µL 0.17   Absolute Basophils 0.00 - 0.10 Thousands/µL 0.03   (L): Data is abnormally low  (H): Data is abnormally high    Imagin2024 VAS VENOUS DUPLEX -LOWER LIMB UNILATERAL      Impression:     RIGHT LOWER LIMB LIMITED:  Evaluation shows no evidence of thrombus in the common femoral vein.  Doppler evaluation shows a normal response to augmentation maneuvers.     LEFT LOWER LIMB:  Evaluation shows acute non-occlusive deep vein thrombosis noted in the  gastrocnemius and peroneal veins.  No evidence of superficial thrombophlebitis noted.  Doppler evaluation shows a normal response to augmentation maneuvers.  Popliteal, posterior tibial and anterior tibial arterial Doppler waveform's are  monophasic.     Technical findings were given to APOLLO Hoff via tiger text at 13:18.     SIGNATURE:  Electronically Signed by: DELVIN SHAVER MD, RPVI on 2024 10:21:53 PM    Please note:  This report has been generated by a voice recognition software system. Therefore there may be syntax, spelling, and/or grammatical errors. Please call if you have any questions.    VIRTUAL VISIT  DISCLAIMER    Thomas Nguyenkathleen acknowledges that he has consented to an online visit or consultation. he understands that the online visit is based solely on information provided by him, and that, in the absence of a face-to-face physical evaluation by the physician, the diagnosis he receives is both limited and provisional in terms of accuracy and completeness. This is not intended to replace a full medical face-to-face evaluation by the physician. Thomas Horne understands and accepts these terms.

## 2024-08-26 NOTE — PROGRESS NOTES
Patient has continued pain in his feet.  He is requesting a refill of Percocet.  This has been ordered.  It has been sent to his local pharmacy.  Patient must follow-up with vascular surgery.  Present for office visit.

## 2024-08-26 NOTE — PRE-PROCEDURE INSTRUCTIONS
Pre-Surgery Instructions:   Medication Instructions    amLODIPine (NORVASC) 10 mg tablet Take day of surgery.    aspirin 81 mg chewable tablet Take day of surgery.    atorvastatin (LIPITOR) 40 mg tablet Take night before surgery    cloNIDine (CATAPRES) 0.1 mg tablet Take day of surgery.    DULoxetine (Cymbalta) 30 mg delayed release capsule Take day of surgery.    Empagliflozin (Jardiance) 25 MG TABS Stop taking 4 days prior to surgery. OMAR 8/26    fenofibrate 160 MG tablet Take day of surgery.    glimepiride (AMARYL) 2 mg tablet Hold day of surgery.    insulin glargine (LANTUS) 100 units/mL subcutaneous injection Take night before surgery    isosorbide mononitrate (IMDUR) 30 mg 24 hr tablet Take day of surgery.    ketoconazole (NIZORAL) 2 % cream Hold day of surgery.    metFORMIN (GLUCOPHAGE) 500 mg tablet Hold day of surgery.    metoprolol tartrate (LOPRESSOR) 50 mg tablet Take day of surgery.    Multiple Vitamins-Minerals (MULTIVITAMIN MEN 50+ PO) Stop taking 7 days prior to surgery.    Omega-3 Fatty Acids (fish oil) 1,000 mg Stop taking 7 days prior to surgery.    pantoprazole (PROTONIX) 40 mg tablet Take day of surgery.    pentoxifylline (TRENtal) 400 mg ER tablet Take day of surgery.    pregabalin (LYRICA) 100 mg capsule Take day of surgery.    ranolazine (RANEXA) 1000 MG SR tablet Take day of surgery.    sitaGLIPtin (Januvia) 100 mg tablet Hold day of surgery.    sodium hypochlorite (DAKIN'S QUARTER-STRENGTH) Instructions provided by MD    torsemide (DEMADEX) 20 mg tablet Hold day of surgery.    warfarin (Coumadin) 2.5 mg tablet Instructions provided by MD NELSON 8/24   Medication instructions for day surgery reviewed. Please use only a sip of water to take your instructed medications. Avoid all over the counter vitamins, supplements and NSAIDS for one week prior to surgery per anesthesia guidelines. Tylenol is ok to take as needed.     You will receive a call one business day prior to surgery with an arrival time  and hospital directions. If your surgery is scheduled on a Monday, the hospital will be calling you on the Friday prior to your surgery. If you have not heard from anyone by 8pm, please call the hospital supervisor through the hospital  at [phone]. ([place] [phone] or [place] [phone]).    Do not eat or drink anything after midnight the night before your surgery, including candy, mints, lifesavers, or chewing gum. Do not drink alcohol 24hrs before your surgery. Try not to smoke at least 24hrs before your surgery.       Follow the pre surgery showering instructions as listed in the “My Surgical Experience Booklet” or otherwise provided by your surgeon's office. Do not use a blade to shave the surgical area 1 week before surgery. It is okay to use a clean electric clippers up to 24 hours before surgery. Do not apply any lotions, creams, including makeup, cologne, deodorant, or perfumes after showering on the day of your surgery. Do not use dry shampoo, hair spray, hair gel, or any type of hair products.     No contact lenses, eye make-up, or artificial eyelashes. Remove nail polish, including gel polish, and any artificial, gel, or acrylic nails if possible. Remove all jewelry including rings and body piercing jewelry.     Wear causal clothing that is easy to take on and off. Consider your type of surgery.    Keep any valuables, jewelry, piercings at home. Please bring any specially ordered equipment (sling, braces) if indicated.    Arrange for a responsible person to drive you to and from the hospital on the day of your surgery. Please confirm the visitor policy for the day of your procedure when you receive your phone call with an arrival time.     Call the surgeon's office with any new illnesses, exposures, or additional questions prior to surgery.    Please reference your “My Surgical Experience Booklet” for additional information to prepare for your upcoming surgery.

## 2024-08-27 ENCOUNTER — TELEPHONE (OUTPATIENT)
Age: 79
End: 2024-08-27

## 2024-08-27 ENCOUNTER — TELEPHONE (OUTPATIENT)
Dept: NEPHROLOGY | Facility: CLINIC | Age: 79
End: 2024-08-27

## 2024-08-27 ENCOUNTER — TELEPHONE (OUTPATIENT)
Dept: PODIATRY | Facility: CLINIC | Age: 79
End: 2024-08-27

## 2024-08-27 ENCOUNTER — ANTICOAG VISIT (OUTPATIENT)
Dept: FAMILY MEDICINE CLINIC | Facility: CLINIC | Age: 79
End: 2024-08-27

## 2024-08-27 NOTE — TELEPHONE ENCOUNTER
LMOM letting patient know his medication has been sent in he must see vascular but Dr. Arango would like to see him in the office so if he could call back and set something up.

## 2024-08-27 NOTE — TELEPHONE ENCOUNTER
RENAL NOTE   Thomas Horne 79 y.o. male MRN: [id number]  Unit/Bed#:  Encounter: [id number]    I have personally discussed the risks and benefits of the surgery from a renal standpoint with the patient in depth, and advised the patient about the risk of IVY and the course of IVY if it occurs with the small probability of the need for renal replacement therapy in the worse case scenario. Patient voiced understanding.    From a renal standpoint the patient is renally optimized for surgery with the following recommendations:  Fluids to administer: Other - Per anesthesia protocol.     Medication Recommendations:  Minimize any IV contrast use   Avoid NSAIDs.   Hold torsemide and jardiance starting on the day of surgery/procedure  Hemodynamic Recommendations:  Ideally, target MAPs greater than 65 mmHg in the perioperative period, and minimize operative time with MAPs < 65 mmHg.   Avoid intraop hemodynamic instability to decrease risk for IVY occurrence.  Other Recommendations:  Please consult the Nephrology team when the patient is admitted

## 2024-08-27 NOTE — TELEPHONE ENCOUNTER
Spoke with patient in regards to message he received a call and thought it was from our office , I told him to try and reach out to vascular , he stated he will call them as well to see if they tried to contact him

## 2024-08-27 NOTE — TELEPHONE ENCOUNTER
Samantha from [facility] called to verify we received a letter that she sent for clearance for surgery. Patient is scheduled to have surgery on Friday that cannot be delayed. Would like to see if we could send the clearance ASAP.

## 2024-08-27 NOTE — TELEPHONE ENCOUNTER
Caller: Thomas Horne    Doctor: Sundar Arango DPM    Reason for call: Thomas was returning a call from the office to schedule an appointment.  He is going in for vascular surgery on 8/30/24.  He will be in the hospital for 3 or 4 days.  Can the office call him and schedule an appointment for Thomas in September.  Thank you!    Call back#: [phone]

## 2024-08-27 NOTE — TELEPHONE ENCOUNTER
Clearance note from Dr. Fischer in [facility].  (Pt scheduled for vascular surgery on  8/30/24)

## 2024-08-29 ENCOUNTER — ANESTHESIA (OUTPATIENT)
Dept: ANESTHESIOLOGY | Facility: HOSPITAL | Age: 79
End: 2024-08-29

## 2024-08-29 ENCOUNTER — ANESTHESIA EVENT (OUTPATIENT)
Dept: ANESTHESIOLOGY | Facility: HOSPITAL | Age: 79
End: 2024-08-29

## 2024-08-29 PROBLEM — Z86.718 HISTORY OF DVT (DEEP VEIN THROMBOSIS): Chronic | Status: ACTIVE | Noted: 2024-08-26

## 2024-08-30 ENCOUNTER — ANESTHESIA (OUTPATIENT)
Dept: PERIOP | Facility: HOSPITAL | Age: 79
DRG: 253 | End: 2024-08-30
Payer: COMMERCIAL

## 2024-08-30 ENCOUNTER — APPOINTMENT (OUTPATIENT)
Dept: RADIOLOGY | Facility: HOSPITAL | Age: 79
DRG: 253 | End: 2024-08-30
Payer: COMMERCIAL

## 2024-08-30 ENCOUNTER — HOSPITAL ENCOUNTER (INPATIENT)
Facility: HOSPITAL | Age: 79
LOS: 5 days | Discharge: HOME HEALTH | DRG: 253 | End: 2024-09-04
Attending: SURGERY | Admitting: SURGERY
Payer: COMMERCIAL

## 2024-08-30 DIAGNOSIS — I73.9 PERIPHERAL ARTERY DISEASE (HCC): ICD-10-CM

## 2024-08-30 DIAGNOSIS — L97.521 DIABETIC ULCER OF TOE OF LEFT FOOT ASSOCIATED WITH TYPE 2 DIABETES MELLITUS, LIMITED TO BREAKDOWN OF SKIN (HCC): ICD-10-CM

## 2024-08-30 DIAGNOSIS — E11.51 TYPE 2 DIABETES MELLITUS WITH DIABETIC PERIPHERAL ANGIOPATHY WITHOUT GANGRENE, WITH LONG-TERM CURRENT USE OF INSULIN (HCC): ICD-10-CM

## 2024-08-30 DIAGNOSIS — N18.31 STAGE 3A CHRONIC KIDNEY DISEASE (HCC): Primary | ICD-10-CM

## 2024-08-30 DIAGNOSIS — E11.621 DIABETIC ULCER OF TOE OF LEFT FOOT ASSOCIATED WITH TYPE 2 DIABETES MELLITUS, LIMITED TO BREAKDOWN OF SKIN (HCC): ICD-10-CM

## 2024-08-30 DIAGNOSIS — Z79.4 TYPE 2 DIABETES MELLITUS WITH DIABETIC PERIPHERAL ANGIOPATHY WITHOUT GANGRENE, WITH LONG-TERM CURRENT USE OF INSULIN (HCC): ICD-10-CM

## 2024-08-30 LAB
ABO GROUP BLD: NORMAL
BASE EXCESS BLDA CALC-SCNC: -2 MMOL/L (ref -2–3)
BLD GP AB SCN SERPL QL: NEGATIVE
CA-I BLD-SCNC: 1.14 MMOL/L (ref 1.12–1.32)
GLUCOSE SERPL-MCNC: 162 MG/DL (ref 65–140)
GLUCOSE SERPL-MCNC: 204 MG/DL (ref 65–140)
GLUCOSE SERPL-MCNC: 206 MG/DL (ref 65–140)
GLUCOSE SERPL-MCNC: 210 MG/DL (ref 65–140)
GLUCOSE SERPL-MCNC: 216 MG/DL (ref 65–140)
GLUCOSE SERPL-MCNC: 261 MG/DL (ref 65–140)
HCO3 BLDA-SCNC: 23.3 MMOL/L (ref 22–28)
HCT VFR BLD CALC: 30 % (ref 36.5–49.3)
HGB BLDA-MCNC: 10.2 G/DL (ref 12–17)
KCT BLD-ACNC: 210 SEC (ref 89–137)
KCT BLD-ACNC: 229 SEC (ref 89–137)
KCT BLD-ACNC: 261 SEC (ref 89–137)
PCO2 BLD: 25 MMOL/L (ref 21–32)
PCO2 BLD: 42.5 MM HG (ref 36–44)
PH BLD: 7.35 [PH] (ref 7.35–7.45)
PO2 BLD: 72 MM HG (ref 75–129)
POTASSIUM BLD-SCNC: 5 MMOL/L (ref 3.5–5.3)
RH BLD: POSITIVE
SAO2 % BLD FROM PO2: 93 % (ref 60–85)
SODIUM BLD-SCNC: 141 MMOL/L (ref 136–145)
SPECIMEN EXPIRATION DATE: NORMAL
SPECIMEN SOURCE: ABNORMAL

## 2024-08-30 PROCEDURE — 84295 ASSAY OF SERUM SODIUM: CPT

## 2024-08-30 PROCEDURE — 86850 RBC ANTIBODY SCREEN: CPT | Performed by: STUDENT IN AN ORGANIZED HEALTH CARE EDUCATION/TRAINING PROGRAM

## 2024-08-30 PROCEDURE — 82948 REAGENT STRIP/BLOOD GLUCOSE: CPT

## 2024-08-30 PROCEDURE — 041L0JL BYPASS LEFT FEMORAL ARTERY TO POPLITEAL ARTERY WITH SYNTHETIC SUBSTITUTE, OPEN APPROACH: ICD-10-PCS | Performed by: SURGERY

## 2024-08-30 PROCEDURE — 84132 ASSAY OF SERUM POTASSIUM: CPT

## 2024-08-30 PROCEDURE — 99223 1ST HOSP IP/OBS HIGH 75: CPT | Performed by: INTERNAL MEDICINE

## 2024-08-30 PROCEDURE — 86901 BLOOD TYPING SEROLOGIC RH(D): CPT | Performed by: STUDENT IN AN ORGANIZED HEALTH CARE EDUCATION/TRAINING PROGRAM

## 2024-08-30 PROCEDURE — 82947 ASSAY GLUCOSE BLOOD QUANT: CPT

## 2024-08-30 PROCEDURE — C1768 GRAFT, VASCULAR: HCPCS | Performed by: SURGERY

## 2024-08-30 PROCEDURE — 82330 ASSAY OF CALCIUM: CPT

## 2024-08-30 PROCEDURE — 85014 HEMATOCRIT: CPT

## 2024-08-30 PROCEDURE — 82803 BLOOD GASES ANY COMBINATION: CPT

## 2024-08-30 PROCEDURE — 85347 COAGULATION TIME ACTIVATED: CPT

## 2024-08-30 PROCEDURE — 86900 BLOOD TYPING SEROLOGIC ABO: CPT | Performed by: STUDENT IN AN ORGANIZED HEALTH CARE EDUCATION/TRAINING PROGRAM

## 2024-08-30 PROCEDURE — 35656 BPG FEMORAL-POPLITEAL: CPT | Performed by: SURGERY

## 2024-08-30 DEVICE — PROPATEN VASCULAR GRAFT TW RR 6MMX50CM 40CM RINGS HEPARIN
Type: IMPLANTABLE DEVICE | Site: LEG | Status: FUNCTIONAL
Brand: GORE PROPATEN VASCULAR GRAFT

## 2024-08-30 RX ORDER — ONDANSETRON 2 MG/ML
4 INJECTION INTRAMUSCULAR; INTRAVENOUS EVERY 6 HOURS PRN
Status: DISCONTINUED | OUTPATIENT
Start: 2024-08-30 | End: 2024-09-04 | Stop reason: HOSPADM

## 2024-08-30 RX ORDER — ALBUTEROL SULFATE 0.83 MG/ML
2.5 SOLUTION RESPIRATORY (INHALATION) ONCE AS NEEDED
Status: DISCONTINUED | OUTPATIENT
Start: 2024-08-30 | End: 2024-08-30 | Stop reason: HOSPADM

## 2024-08-30 RX ORDER — ATORVASTATIN CALCIUM 40 MG/1
40 TABLET, FILM COATED ORAL
Status: DISCONTINUED | OUTPATIENT
Start: 2024-08-30 | End: 2024-09-04 | Stop reason: HOSPADM

## 2024-08-30 RX ORDER — CLONIDINE HYDROCHLORIDE 0.1 MG/1
0.1 TABLET ORAL EVERY 12 HOURS
Status: DISCONTINUED | OUTPATIENT
Start: 2024-08-30 | End: 2024-08-30

## 2024-08-30 RX ORDER — FENTANYL CITRATE 50 UG/ML
INJECTION, SOLUTION INTRAMUSCULAR; INTRAVENOUS AS NEEDED
Status: DISCONTINUED | OUTPATIENT
Start: 2024-08-30 | End: 2024-08-30

## 2024-08-30 RX ORDER — HEPARIN SODIUM 1000 [USP'U]/ML
INJECTION, SOLUTION INTRAVENOUS; SUBCUTANEOUS AS NEEDED
Status: DISCONTINUED | OUTPATIENT
Start: 2024-08-30 | End: 2024-08-30

## 2024-08-30 RX ORDER — OXYCODONE HYDROCHLORIDE 5 MG/1
5 TABLET ORAL EVERY 6 HOURS PRN
Status: DISCONTINUED | OUTPATIENT
Start: 2024-08-30 | End: 2024-09-04 | Stop reason: HOSPADM

## 2024-08-30 RX ORDER — SENNOSIDES 8.6 MG
1 TABLET ORAL DAILY
Status: DISCONTINUED | OUTPATIENT
Start: 2024-08-30 | End: 2024-09-04 | Stop reason: HOSPADM

## 2024-08-30 RX ORDER — SODIUM CHLORIDE, SODIUM LACTATE, POTASSIUM CHLORIDE, CALCIUM CHLORIDE 600; 310; 30; 20 MG/100ML; MG/100ML; MG/100ML; MG/100ML
125 INJECTION, SOLUTION INTRAVENOUS CONTINUOUS
Status: DISCONTINUED | OUTPATIENT
Start: 2024-08-30 | End: 2024-08-31

## 2024-08-30 RX ORDER — SODIUM CHLORIDE 9 MG/ML
INJECTION, SOLUTION INTRAVENOUS CONTINUOUS PRN
Status: DISCONTINUED | OUTPATIENT
Start: 2024-08-30 | End: 2024-08-30

## 2024-08-30 RX ORDER — CLONIDINE HYDROCHLORIDE 0.1 MG/1
0.1 TABLET ORAL EVERY 12 HOURS
Status: DISCONTINUED | OUTPATIENT
Start: 2024-08-31 | End: 2024-09-04 | Stop reason: HOSPADM

## 2024-08-30 RX ORDER — VASOPRESSIN 20 U/ML
INJECTION PARENTERAL AS NEEDED
Status: DISCONTINUED | OUTPATIENT
Start: 2024-08-30 | End: 2024-08-30

## 2024-08-30 RX ORDER — AMLODIPINE BESYLATE 5 MG/1
5 TABLET ORAL DAILY
Status: DISCONTINUED | OUTPATIENT
Start: 2024-08-30 | End: 2024-08-30

## 2024-08-30 RX ORDER — FENOFIBRATE 145 MG/1
145 TABLET, COATED ORAL DAILY
Status: DISCONTINUED | OUTPATIENT
Start: 2024-08-30 | End: 2024-09-04 | Stop reason: HOSPADM

## 2024-08-30 RX ORDER — INSULIN LISPRO 100 [IU]/ML
1-6 INJECTION, SOLUTION INTRAVENOUS; SUBCUTANEOUS
Status: DISCONTINUED | OUTPATIENT
Start: 2024-08-30 | End: 2024-09-04 | Stop reason: HOSPADM

## 2024-08-30 RX ORDER — DULOXETIN HYDROCHLORIDE 30 MG/1
30 CAPSULE, DELAYED RELEASE ORAL DAILY
Status: DISCONTINUED | OUTPATIENT
Start: 2024-08-30 | End: 2024-09-04 | Stop reason: HOSPADM

## 2024-08-30 RX ORDER — METOPROLOL TARTRATE 25 MG/1
25 TABLET, FILM COATED ORAL EVERY 12 HOURS SCHEDULED
Status: DISCONTINUED | OUTPATIENT
Start: 2024-08-30 | End: 2024-09-04 | Stop reason: HOSPADM

## 2024-08-30 RX ORDER — ACETAMINOPHEN 325 MG/1
975 TABLET ORAL EVERY 8 HOURS SCHEDULED
Status: DISCONTINUED | OUTPATIENT
Start: 2024-08-30 | End: 2024-09-04 | Stop reason: HOSPADM

## 2024-08-30 RX ORDER — INSULIN GLARGINE 100 [IU]/ML
15 INJECTION, SOLUTION SUBCUTANEOUS
Status: DISCONTINUED | OUTPATIENT
Start: 2024-08-30 | End: 2024-09-04 | Stop reason: HOSPADM

## 2024-08-30 RX ORDER — HEPARIN SODIUM 5000 [USP'U]/ML
5000 INJECTION, SOLUTION INTRAVENOUS; SUBCUTANEOUS EVERY 8 HOURS SCHEDULED
Status: DISCONTINUED | OUTPATIENT
Start: 2024-08-30 | End: 2024-09-04 | Stop reason: HOSPADM

## 2024-08-30 RX ORDER — PROTAMINE SULFATE 10 MG/ML
INJECTION, SOLUTION INTRAVENOUS AS NEEDED
Status: DISCONTINUED | OUTPATIENT
Start: 2024-08-30 | End: 2024-08-30

## 2024-08-30 RX ORDER — ONDANSETRON 2 MG/ML
INJECTION INTRAMUSCULAR; INTRAVENOUS AS NEEDED
Status: DISCONTINUED | OUTPATIENT
Start: 2024-08-30 | End: 2024-08-30

## 2024-08-30 RX ORDER — AMLODIPINE BESYLATE 5 MG/1
5 TABLET ORAL DAILY
Status: DISCONTINUED | OUTPATIENT
Start: 2024-08-31 | End: 2024-09-04 | Stop reason: HOSPADM

## 2024-08-30 RX ORDER — SIMETHICONE 80 MG
80 TABLET,CHEWABLE ORAL EVERY 6 HOURS PRN
Status: DISCONTINUED | OUTPATIENT
Start: 2024-08-30 | End: 2024-09-04 | Stop reason: HOSPADM

## 2024-08-30 RX ORDER — NITROGLYCERIN 0.4 MG/1
0.4 TABLET SUBLINGUAL
Status: DISCONTINUED | OUTPATIENT
Start: 2024-08-30 | End: 2024-09-04 | Stop reason: HOSPADM

## 2024-08-30 RX ORDER — ALBUMIN, HUMAN INJ 5% 5 %
SOLUTION INTRAVENOUS CONTINUOUS PRN
Status: DISCONTINUED | OUTPATIENT
Start: 2024-08-30 | End: 2024-08-30

## 2024-08-30 RX ORDER — HYDROMORPHONE HCL IN WATER/PF 6 MG/30 ML
0.2 PATIENT CONTROLLED ANALGESIA SYRINGE INTRAVENOUS
Status: DISCONTINUED | OUTPATIENT
Start: 2024-08-30 | End: 2024-08-30 | Stop reason: HOSPADM

## 2024-08-30 RX ORDER — SODIUM CHLORIDE, SODIUM LACTATE, POTASSIUM CHLORIDE, CALCIUM CHLORIDE 600; 310; 30; 20 MG/100ML; MG/100ML; MG/100ML; MG/100ML
75 INJECTION, SOLUTION INTRAVENOUS CONTINUOUS
Status: DISPENSED | OUTPATIENT
Start: 2024-08-30 | End: 2024-08-30

## 2024-08-30 RX ORDER — CEFAZOLIN SODIUM 1 G/3ML
INJECTION, POWDER, FOR SOLUTION INTRAMUSCULAR; INTRAVENOUS AS NEEDED
Status: DISCONTINUED | OUTPATIENT
Start: 2024-08-30 | End: 2024-08-30

## 2024-08-30 RX ORDER — RANOLAZINE 500 MG/1
1000 TABLET, EXTENDED RELEASE ORAL 2 TIMES DAILY
Status: DISCONTINUED | OUTPATIENT
Start: 2024-08-30 | End: 2024-09-04 | Stop reason: HOSPADM

## 2024-08-30 RX ORDER — CLOPIDOGREL BISULFATE 75 MG/1
75 TABLET ORAL DAILY
Status: DISCONTINUED | OUTPATIENT
Start: 2024-08-30 | End: 2024-09-04 | Stop reason: HOSPADM

## 2024-08-30 RX ORDER — PROPOFOL 10 MG/ML
INJECTION, EMULSION INTRAVENOUS AS NEEDED
Status: DISCONTINUED | OUTPATIENT
Start: 2024-08-30 | End: 2024-08-30

## 2024-08-30 RX ORDER — OXYCODONE HYDROCHLORIDE 10 MG/1
10 TABLET ORAL EVERY 6 HOURS PRN
Status: DISCONTINUED | OUTPATIENT
Start: 2024-08-30 | End: 2024-09-04 | Stop reason: HOSPADM

## 2024-08-30 RX ORDER — SODIUM CHLORIDE, SODIUM LACTATE, POTASSIUM CHLORIDE, CALCIUM CHLORIDE 600; 310; 30; 20 MG/100ML; MG/100ML; MG/100ML; MG/100ML
INJECTION, SOLUTION INTRAVENOUS CONTINUOUS PRN
Status: DISCONTINUED | OUTPATIENT
Start: 2024-08-30 | End: 2024-08-30

## 2024-08-30 RX ORDER — ROCURONIUM BROMIDE 10 MG/ML
INJECTION, SOLUTION INTRAVENOUS AS NEEDED
Status: DISCONTINUED | OUTPATIENT
Start: 2024-08-30 | End: 2024-08-30

## 2024-08-30 RX ORDER — PREGABALIN 50 MG/1
100 CAPSULE ORAL 3 TIMES DAILY
Status: DISCONTINUED | OUTPATIENT
Start: 2024-08-30 | End: 2024-09-04 | Stop reason: HOSPADM

## 2024-08-30 RX ORDER — TORSEMIDE 20 MG/1
20 TABLET ORAL DAILY
Status: DISCONTINUED | OUTPATIENT
Start: 2024-08-30 | End: 2024-08-30

## 2024-08-30 RX ORDER — CHLORHEXIDINE GLUCONATE ORAL RINSE 1.2 MG/ML
15 SOLUTION DENTAL ONCE
Status: COMPLETED | OUTPATIENT
Start: 2024-08-30 | End: 2024-08-30

## 2024-08-30 RX ORDER — FENTANYL CITRATE/PF 50 MCG/ML
25 SYRINGE (ML) INJECTION
Status: DISCONTINUED | OUTPATIENT
Start: 2024-08-30 | End: 2024-08-30 | Stop reason: HOSPADM

## 2024-08-30 RX ORDER — LIDOCAINE HYDROCHLORIDE 10 MG/ML
INJECTION, SOLUTION EPIDURAL; INFILTRATION; INTRACAUDAL; PERINEURAL AS NEEDED
Status: DISCONTINUED | OUTPATIENT
Start: 2024-08-30 | End: 2024-08-30

## 2024-08-30 RX ORDER — HYDROMORPHONE HCL/PF 1 MG/ML
SYRINGE (ML) INJECTION AS NEEDED
Status: DISCONTINUED | OUTPATIENT
Start: 2024-08-30 | End: 2024-08-30

## 2024-08-30 RX ORDER — DEXAMETHASONE SODIUM PHOSPHATE 10 MG/ML
INJECTION, SOLUTION INTRAMUSCULAR; INTRAVENOUS AS NEEDED
Status: DISCONTINUED | OUTPATIENT
Start: 2024-08-30 | End: 2024-08-30

## 2024-08-30 RX ORDER — PENTOXIFYLLINE 400 MG/1
400 TABLET, EXTENDED RELEASE ORAL
Status: DISCONTINUED | OUTPATIENT
Start: 2024-08-30 | End: 2024-09-04 | Stop reason: HOSPADM

## 2024-08-30 RX ORDER — PANTOPRAZOLE SODIUM 40 MG/1
40 TABLET, DELAYED RELEASE ORAL
Status: DISCONTINUED | OUTPATIENT
Start: 2024-08-30 | End: 2024-09-04 | Stop reason: HOSPADM

## 2024-08-30 RX ORDER — CALCIUM CHLORIDE 100 MG/ML
INJECTION INTRAVENOUS; INTRAVENTRICULAR AS NEEDED
Status: DISCONTINUED | OUTPATIENT
Start: 2024-08-30 | End: 2024-08-30

## 2024-08-30 RX ORDER — ONDANSETRON 2 MG/ML
4 INJECTION INTRAMUSCULAR; INTRAVENOUS ONCE AS NEEDED
Status: DISCONTINUED | OUTPATIENT
Start: 2024-08-30 | End: 2024-08-30 | Stop reason: HOSPADM

## 2024-08-30 RX ADMIN — PHENYLEPHRINE HYDROCHLORIDE 50 MCG/MIN: 10 INJECTION INTRAVENOUS at 07:58

## 2024-08-30 RX ADMIN — ROCURONIUM 50 MG: 50 INJECTION, SOLUTION INTRAVENOUS at 07:52

## 2024-08-30 RX ADMIN — HEPARIN SODIUM 5000 UNITS: 5000 INJECTION INTRAVENOUS; SUBCUTANEOUS at 15:42

## 2024-08-30 RX ADMIN — DULOXETINE HYDROCHLORIDE 30 MG: 30 CAPSULE, DELAYED RELEASE ORAL at 15:47

## 2024-08-30 RX ADMIN — ONDANSETRON 4 MG: 2 INJECTION INTRAMUSCULAR; INTRAVENOUS at 07:52

## 2024-08-30 RX ADMIN — INSULIN GLARGINE 15 UNITS: 100 INJECTION, SOLUTION SUBCUTANEOUS at 21:00

## 2024-08-30 RX ADMIN — SUGAMMADEX 200 MG: 100 INJECTION, SOLUTION INTRAVENOUS at 11:54

## 2024-08-30 RX ADMIN — CALCIUM CHLORIDE 0.5 G: 100 INJECTION INTRAVENOUS; INTRAVENTRICULAR at 11:24

## 2024-08-30 RX ADMIN — ACETAMINOPHEN 975 MG: 325 TABLET ORAL at 15:42

## 2024-08-30 RX ADMIN — SODIUM CHLORIDE: 0.9 INJECTION, SOLUTION INTRAVENOUS at 08:01

## 2024-08-30 RX ADMIN — PROTAMINE SULFATE 50 MG: 10 INJECTION, SOLUTION INTRAVENOUS at 11:21

## 2024-08-30 RX ADMIN — INSULIN LISPRO 2 UNITS: 100 INJECTION, SOLUTION INTRAVENOUS; SUBCUTANEOUS at 16:06

## 2024-08-30 RX ADMIN — SODIUM CHLORIDE: 0.9 INJECTION, SOLUTION INTRAVENOUS at 11:18

## 2024-08-30 RX ADMIN — FENTANYL CITRATE 50 MCG: 50 INJECTION INTRAMUSCULAR; INTRAVENOUS at 08:31

## 2024-08-30 RX ADMIN — HEPARIN SODIUM 3000 UNITS: 1000 INJECTION INTRAVENOUS; SUBCUTANEOUS at 10:49

## 2024-08-30 RX ADMIN — PANTOPRAZOLE SODIUM 40 MG: 40 TABLET, DELAYED RELEASE ORAL at 15:42

## 2024-08-30 RX ADMIN — CLOPIDOGREL BISULFATE 75 MG: 75 TABLET ORAL at 15:47

## 2024-08-30 RX ADMIN — CEFAZOLIN 2000 MG: 1 INJECTION, POWDER, FOR SOLUTION INTRAMUSCULAR; INTRAVENOUS at 08:23

## 2024-08-30 RX ADMIN — Medication 20 MG: at 08:23

## 2024-08-30 RX ADMIN — HYDROMORPHONE HYDROCHLORIDE 0.5 MG: 1 INJECTION, SOLUTION INTRAMUSCULAR; INTRAVENOUS; SUBCUTANEOUS at 09:17

## 2024-08-30 RX ADMIN — SENNOSIDES 8.6 MG: 8.6 TABLET, FILM COATED ORAL at 15:47

## 2024-08-30 RX ADMIN — ISOSORBIDE MONONITRATE 90 MG: 60 TABLET, EXTENDED RELEASE ORAL at 15:42

## 2024-08-30 RX ADMIN — HEPARIN SODIUM 3000 UNITS: 1000 INJECTION INTRAVENOUS; SUBCUTANEOUS at 10:14

## 2024-08-30 RX ADMIN — HEPARIN SODIUM 10000 UNITS: 1000 INJECTION INTRAVENOUS; SUBCUTANEOUS at 09:28

## 2024-08-30 RX ADMIN — INSULIN LISPRO 3 UNITS: 100 INJECTION, SOLUTION INTRAVENOUS; SUBCUTANEOUS at 21:00

## 2024-08-30 RX ADMIN — FENOFIBRATE 145 MG: 145 TABLET ORAL at 15:42

## 2024-08-30 RX ADMIN — VASOPRESSIN 2 UNITS: 20 INJECTION INTRAVENOUS at 09:49

## 2024-08-30 RX ADMIN — HEPARIN SODIUM 5000 UNITS: 5000 INJECTION INTRAVENOUS; SUBCUTANEOUS at 21:00

## 2024-08-30 RX ADMIN — VASOPRESSIN 1 UNITS: 20 INJECTION INTRAVENOUS at 09:38

## 2024-08-30 RX ADMIN — VASOPRESSIN 2 UNITS: 20 INJECTION INTRAVENOUS at 08:29

## 2024-08-30 RX ADMIN — VASOPRESSIN 2 UNITS: 20 INJECTION INTRAVENOUS at 11:14

## 2024-08-30 RX ADMIN — PREGABALIN 100 MG: 50 CAPSULE ORAL at 20:56

## 2024-08-30 RX ADMIN — LIDOCAINE HYDROCHLORIDE 50 MG: 10 INJECTION, SOLUTION EPIDURAL; INFILTRATION; INTRACAUDAL; PERINEURAL at 07:52

## 2024-08-30 RX ADMIN — SODIUM CHLORIDE, SODIUM LACTATE, POTASSIUM CHLORIDE, AND CALCIUM CHLORIDE: .6; .31; .03; .02 INJECTION, SOLUTION INTRAVENOUS at 12:02

## 2024-08-30 RX ADMIN — VASOPRESSIN 2 UNITS: 20 INJECTION INTRAVENOUS at 11:21

## 2024-08-30 RX ADMIN — RANOLAZINE 1000 MG: 500 TABLET, FILM COATED, EXTENDED RELEASE ORAL at 17:02

## 2024-08-30 RX ADMIN — ATORVASTATIN CALCIUM 40 MG: 40 TABLET, FILM COATED ORAL at 15:47

## 2024-08-30 RX ADMIN — DEXAMETHASONE SODIUM PHOSPHATE 10 MG: 10 INJECTION, SOLUTION INTRAMUSCULAR; INTRAVENOUS at 07:52

## 2024-08-30 RX ADMIN — PENTOXIFYLLINE 400 MG: 400 TABLET, EXTENDED RELEASE ORAL at 17:01

## 2024-08-30 RX ADMIN — CHLORHEXIDINE GLUCONATE 0.12% ORAL RINSE 15 ML: 1.2 LIQUID ORAL at 07:18

## 2024-08-30 RX ADMIN — CALCIUM CHLORIDE 0.5 G: 100 INJECTION INTRAVENOUS; INTRAVENTRICULAR at 11:57

## 2024-08-30 RX ADMIN — ALBUMIN (HUMAN): 12.5 INJECTION, SOLUTION INTRAVENOUS at 10:35

## 2024-08-30 RX ADMIN — NOREPINEPHRINE BITARTRATE 4 MCG/MIN: 1 SOLUTION INTRAVENOUS at 09:56

## 2024-08-30 RX ADMIN — VASOPRESSIN 2 UNITS: 20 INJECTION INTRAVENOUS at 10:59

## 2024-08-30 RX ADMIN — SODIUM CHLORIDE, SODIUM LACTATE, POTASSIUM CHLORIDE, AND CALCIUM CHLORIDE: .6; .31; .03; .02 INJECTION, SOLUTION INTRAVENOUS at 07:10

## 2024-08-30 RX ADMIN — PROPOFOL 130 MG: 10 INJECTION, EMULSION INTRAVENOUS at 07:52

## 2024-08-30 RX ADMIN — METOPROLOL TARTRATE 25 MG: 25 TABLET, FILM COATED ORAL at 15:42

## 2024-08-30 RX ADMIN — SODIUM CHLORIDE, SODIUM LACTATE, POTASSIUM CHLORIDE, AND CALCIUM CHLORIDE 75 ML/HR: .6; .31; .03; .02 INJECTION, SOLUTION INTRAVENOUS at 14:23

## 2024-08-30 RX ADMIN — INSULIN HUMAN 5 UNITS: 100 INJECTION, SOLUTION PARENTERAL at 13:40

## 2024-08-30 RX ADMIN — SODIUM CHLORIDE: 0.9 INJECTION, SOLUTION INTRAVENOUS at 09:37

## 2024-08-30 RX ADMIN — ACETAMINOPHEN 975 MG: 325 TABLET ORAL at 21:00

## 2024-08-30 RX ADMIN — VASOPRESSIN 2 UNITS: 20 INJECTION INTRAVENOUS at 11:50

## 2024-08-30 RX ADMIN — SIMETHICONE 80 MG: 80 TABLET, CHEWABLE ORAL at 22:58

## 2024-08-30 RX ADMIN — FENTANYL CITRATE 50 MCG: 50 INJECTION INTRAMUSCULAR; INTRAVENOUS at 07:52

## 2024-08-30 RX ADMIN — VASOPRESSIN 2 UNITS: 20 INJECTION INTRAVENOUS at 10:38

## 2024-08-30 RX ADMIN — HYDROMORPHONE HYDROCHLORIDE 0.5 MG: 1 INJECTION, SOLUTION INTRAMUSCULAR; INTRAVENOUS; SUBCUTANEOUS at 11:12

## 2024-08-30 RX ADMIN — PREGABALIN 100 MG: 50 CAPSULE ORAL at 15:47

## 2024-08-30 NOTE — PROGRESS NOTES
Follow up Consultation    Nephrology   Thomas Horne 79 y.o. male MRN: [id number]  Unit/Bed#: [facility] 520-01 Encounter: [id number]      Physician Requesting Consult: Vasquez Medina*        ASSESSMENT:  79-year-old male with multiple comorbidities including peripheral arterial disease, hypertension, diabetes and CKD stage III admitted for elective left lower extremity vascular bypass surgery on 8/30/2024.  Nephrology consulted for perioperative optimization to reduce incidence for acute kidney injury      CKD stage III/perioperative optimization to reduce incidence for acute kidney injury:   At risk for IVY most likely secondary to ischemic injury secondary to hemodynamic perturbation intraoperatively in light of underlying comorbidities  After review of records it appears that the patient has a baseline Creatinine of 1.6-1.8 mg/dL.  Most recent labs prior to admission from 8/26/2024 with a creatinine 1.57 mg/dL  Patient's creatinine today is at 1.27 mg/dL stable and slightly below baseline  check BMP, magnesium, phosphorus in a.m.  Hold further IV fluids for now  Place on a renal diet when allowed diet order.   Strict I/O.  Daily weights  Urinary retention protocol  Avoid nephrotoxins, adjust meds to appropriate GFR.  Likely has underlying CKD secondary to age-related nephron loss plus hypertensive nephrosclerosis plus diabetic kidney disease plus some component of cardiorenal syndrome.  Outpatient for nephrology follows up with Dr. Amado Velazquez for discharge from renal standpoint medically cleared    H&H/anemia:  most recent hemoglobin at 9.3 g/dL  Maintain hemoglobin greater than 8 grams/deciliter  Continue close monitoring for now    Acid-base electrolytes:  Acid-base electrolytes stable    Blood pressure/hypertension:   Optimize hemodynamic status to avoid delay in renal recovery.  Maintain MAP > 65mmHg  Avoid BP fluctuations.  Home medications: Clonidine 0.1 mg p.o. every 12, Norvasc 5 mg p.o. daily,  "Jardiance 25 mg p.o. daily, Imdur 90 mg p.o. daily, metoprolol 25 mg p.o. every 12, torsemide 20 mg p.o. daily  Current medications: Norvasc 5 mg p.o. daily, clonidine 0.1 mg p.o. every 12, metoprolol 25 mg p.o. every 12, Imdur 90 mg p.o. daily,   Okay to resume home torsemide    Other medical problems:  History of robotic prostatectomy 2015:  Management per primary team.  Follow-up with urology status post Dyer catheter placed in OR  Peripheral arterial disease:  Management per primary team.  Status post left distal superficial femoral artery bypass to below-knee popliteal artery bypass on 2024.  Follow-up with vascular.    Plan below is what was discussed with the primary team that they agree with:  check BMP, magnesium, phosphorus in a.m.  Hold further IV fluids for now  Okay to resume home torsemide from tomorrow  Stable for discharge from renal standpoint medically cleared  Thanks for the consult  Will continue to follow  Please call with questions/ concerns.      Briseyda Reagan MD, FASN, 2024, 10:55 AM                Objective :   Patient seen and examined in his room no overnight events hemodynamically stable remains afebrile.  No overt complaints.      PHYSICAL EXAM  /61   Pulse 65   Temp (!) 97.4 °F (36.3 °C) (Oral)   Resp 18   Ht 6' 2\" (1.88 m)   Wt 105 kg (231 lb)   SpO2 99%   BMI 29.66 kg/m²   Temp (24hrs), Av.5 °F (36.4 °C), Min:97.3 °F (36.3 °C), Max:97.9 °F (36.6 °C)        Intake/Output Summary (Last 24 hours) at 2024 1055  Last data filed at 2024 0830  Gross per 24 hour   Intake 4455 ml   Output 455 ml   Net 4000 ml       I/O last 24 hours:  In: 5455 [P.O.:425; I.V.:4780; IV Piggyback:250]  Out: 770 [Urine:470; Blood:300]      Current Weight: Weight - Scale: 105 kg (231 lb)  First Weight: Weight - Scale: 105 kg (231 lb)  Physical Exam  Vitals and nursing note reviewed.   Constitutional:       General: He is not in acute distress.     Appearance: Normal " appearance. He is normal weight. He is not ill-appearing, toxic-appearing or diaphoretic.   HENT:      Head: Normocephalic and atraumatic.      Mouth/Throat:      Pharynx: No oropharyngeal exudate.   Eyes:      General: No scleral icterus.  Cardiovascular:      Rate and Rhythm: Normal rate.   Pulmonary:      Effort: No respiratory distress.      Breath sounds: No wheezing.   Abdominal:      General: There is no distension.      Palpations: Abdomen is soft.      Tenderness: There is no abdominal tenderness.   Musculoskeletal:         General: No swelling.      Cervical back: Normal range of motion. No rigidity.      Comments: Left lower extremity dressing   Skin:     Coloration: Skin is not jaundiced.   Neurological:      General: No focal deficit present.      Mental Status: He is alert and oriented to person, place, and time.   Psychiatric:         Mood and Affect: Mood normal.         Behavior: Behavior normal.             Review of Systems   Constitutional:  Negative for chills and fever.   HENT:  Negative for congestion.    Respiratory:  Negative for cough and shortness of breath.    Cardiovascular:  Negative for leg swelling.   Gastrointestinal:  Negative for abdominal pain.   Skin:  Positive for wound.   Neurological:  Negative for headaches.   Psychiatric/Behavioral:  Negative for confusion.    All other systems reviewed and are negative.      Scheduled Meds:  Current Facility-Administered Medications   Medication Dose Route Frequency Provider Last Rate    acetaminophen  975 mg Oral Q8H [facility] Ezequiel Pereira, DO      amLODIPine  5 mg Oral Daily Tiny Parrish PA-C      atorvastatin  40 mg Oral Daily With Dinner Ezequiel Pereira, DO      cloNIDine  0.1 mg Oral Q12H Tiny Parrish PA-C      clopidogrel  75 mg Oral Daily Ezequiel Pereira, DO      DULoxetine  30 mg Oral Daily Ezequiel Pereira, DO      fenofibrate  145 mg Oral Daily Ezequiel Pereira, DO      heparin (porcine)  5,000 Units Subcutaneous Q8H [facility] Ezequiel Pereira, DO       insulin glargine  15 Units Subcutaneous HS Ezequiel Pereira, DO      insulin lispro  1-6 Units Subcutaneous 4x Daily (with meals and at bedtime) Ezequiel Pereira, DO      isosorbide mononitrate  90 mg Oral Daily Ezequiel Pereira, DO      lactated ringers  500 mL Intravenous Once PRN Ezequiel Pereira, DO      And    lactated ringers  500 mL Intravenous Once PRN Ezequiel Pereira, DO      metoprolol tartrate  25 mg Oral Q12H [facility] Ezequiel Pereira, DO      nitroglycerin  0.4 mg Sublingual Q5 Min PRN Ezequiel Pereira, DO      ondansetron  4 mg Intravenous Q6H PRN Ezequiel Pereira, DO      oxyCODONE  10 mg Oral Q6H PRN Ezequiel Pereira, DO      oxyCODONE  5 mg Oral Q6H PRN Ezequiel Pereira, DO      pantoprazole  40 mg Oral Early Morning Ezequiel Pereira, DO      pentoxifylline  400 mg Oral TID With Meals Ezequiel Pereira, DO      pregabalin  100 mg Oral TID Ezequiel Pereira, DO      ranolazine  1,000 mg Oral BID Ezequiel Pereira, DO      senna  1 tablet Oral Daily Ezequiel Pereira, DO      simethicone  80 mg Oral Q6H PRN Estelle Boucher PA-C      sodium chloride  500 mL Intravenous Once PRN Ezequiel Pereira, DO      And    sodium chloride  500 mL Intravenous Once PRN Ezequiel Pereira, DO         PRN Meds:.  lactated ringers **AND** lactated ringers    nitroglycerin    ondansetron    oxyCODONE    oxyCODONE    simethicone    sodium chloride **AND** sodium chloride    Continuous Infusions:         Invasive Devices:     Invasive Devices       Peripheral Intravenous Line  Duration             Peripheral IV 08/30/24 Left;Dorsal (posterior) Hand 1 day    Peripheral IV 08/30/24 Right;Dorsal (posterior) Hand 1 day              Arterial Line  Duration             Arterial Line 08/30/24 Right Radial 1 day                      LABORATORY:    Results from last 7 days   Lab Units 08/31/24  0455 08/30/24  1121 08/26/24  0834   WBC Thousand/uL 10.86*  --  10.15   HEMOGLOBIN g/dL 9.3*  --  13.3   I STAT HEMOGLOBIN g/dl  --  10.2*  --    HEMATOCRIT % 29.9*  --  43.1   HEMATOCRIT, ISTAT %  --  30*  --  "   PLATELETS Thousands/uL 213  --  258   POTASSIUM mmol/L 4.0  --  4.3   CHLORIDE mmol/L 110*  --  103   CO2 mmol/L 25  --  28   CO2, I-STAT mmol/L  --  25  --    BUN mg/dL 31*  --  33*   CREATININE mg/dL 1.27  --  1.57*   CALCIUM mg/dL 8.4  --  9.5   GLUCOSE, ISTAT mg/dl  --  206*  --       rest all reviewed    RADIOLOGY:  No orders to display     Rest all reviewed    Portions of the record may have been created with voice recognition software. Occasional wrong word or \"sound a like\" substitutions may have occurred due to the inherent limitations of voice recognition software. Read the chart carefully and recognize, using context, where substitutions have occurred.If you have any questions, please contact the dictating provider.    "

## 2024-08-30 NOTE — ANESTHESIA POSTPROCEDURE EVALUATION
Post-Op Assessment Note    CV Status:  Stable  Pain Score: 0    Pain management: adequate       Mental Status:  Alert and awake   Hydration Status:  Euvolemic   PONV Controlled:  Controlled   Airway Patency:  Patent  Two or more mitigation strategies used for obstructive sleep apnea   Post Op Vitals Reviewed: Yes    No anethesia notable event occurred.    Staff: Anesthesiologist, CRNA               BP   154/57   Temp   97.4   Pulse  63   Resp   18   SpO2   95

## 2024-08-30 NOTE — QUICK NOTE
I was called into the operating room by vascular surgery as they wanted a catheter placed then this patient has a history of robotic prostatectomy 2015 followed by artificial urinary sphincter placement in 2017.  Discussed whether a catheter was necessary and because of the length and type of the case it felt that it was and therefore I prepped and draped the patient in send sterile fashion and then carefully deactivated his sphincter.  I ensured the sphincter was deactivated in a locked this way and after feeling confident this was the case I a placed a 12 Mexican Dyer catheter without any resistance.  Clear urine was seen to drain.  10 cc in the balloon.    Plan to remove the catheter as soon as the case is over to reduce the risk of erosion.  We will leave the sphincter open and locked such that the patient should drain on his own.  I will have our team recheck the sphincter pump to ensure that it remains feeling open.    Of note the patient's had prior vascular surgery which required deactivation of the sphincter and placement of a 14 Mexican catheter that was removed 48 hours later.  Last PSA check was undetectable in 2023

## 2024-08-30 NOTE — CONSULTS
Consultation - Nephrology   Thomas Horne 79 y.o. male MRN: [id number]  Unit/Bed#: OR POOL Encounter: [id number]    ASSESSMENT/PLAN:   CKD IIIB  Baseline creatinine 1.6-1.8 and follows with Dr Fischer   Last creatinine on 8/26/24 stable at 1.57   Hold torsemide and jardiance today   Continue post op IVF until tolerating po   Avoid NSAIDs, IV dye or other nephrotoxins   Avoid hypotension -- change hold parameters on clonidine and amlodipine for SBP <130   Check am BMP   PAD with diabetic foot ulcer   S/p L distal superficial femoral artery bypass to below knee popliteal artery bypass   Hypertension   BP acceptable   Hold torsemide for now   Change hold parameters as above   CHF   Home torsemide 20mg po daily currently on hold   Appears euvolemic     Summary of Plan:   Hold torsemide and jardiance today   Continue IVF today   Check am BMP     HISTORY OF PRESENT ILLNESS:  Requesting Physician: Vasquez Medina*  Reason for Consult: CKD post op protocol     Thomas Horne is a 79 y.o. year old male with history of PAD and diabetic foot ulcer  electively admitted today and underwent L distal superficial femoral artery bypass to below knee popliteal artery bypass. He has a history of CKD so nephrology was consulted post operatively as part of the IVY prevention protocol. He is currently feeling well and denies any chest pain, shortness of breath, nausea, vomiting or diarrhea.       PAST MEDICAL HISTORY:  Past Medical History:   Diagnosis Date    Anemia     CAD (coronary artery disease)     Callus     Cancer (HCC)     prostate    CHF (congestive heart failure) (HCC)     Chronic kidney disease     Clotting disorder (HCC)     Coronary artery disease 1988    Deep vein thrombosis (HCC)     Diabetes mellitus (HCC)     Difficulty walking     Duodenal ulcer     Heart disease 1988    Hyperlipidemia     Hypertension     Myocardial infarction (HCC)     Neuropathy     Bilateral feet    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)     Plantar  fasciitis     Sleep apnea     Could not tolerate CPAP       PAST SURGICAL HISTORY:  Past Surgical History:   Procedure Laterality Date    ABDOMINAL AORTIC ANEURYSM REPAIR      Stented    ADENOIDECTOMY      BACK SURGERY  1985    CARDIAC CATHETERIZATION Left 10/19/2022    Procedure: Cardiac Left Heart Cath;  Surgeon: Danielle Pereira MD;  Location: [facility];  Service: Cardiology    CARDIAC SURGERY  2002    3 cardiac bypass then angioplasty 7/2020    CHOLECYSTECTOMY      COLONOSCOPY  02/14/2024    CORONARY ARTERY BYPASS GRAFT      IR LOWER EXTREMITY ANGIOGRAM  11/01/2023    IR LOWER EXTREMITY ANGIOGRAM  08/07/2024    LAMINECTOMY  1990    [place] BYPASS W/VEIN FEMORAL-POPLITEAL Right 11/16/2023    Procedure: BYPASS FEMORAL-POPLITEAL WITH CRYO VEIN, RIGHT FEMORAL ENDARTERECTOMY;  Surgeon: Vasquez Clark MD;  Location: [facility];  Service: Vascular    NY SLCTV CATHJ 3RD+ ORD SLCTV ABDL PEL/LXTR BRNCH Right 11/01/2023    Procedure: ARTERIOGRAM Right lower extremity arteriogram with CO2 via right groin access;  Surgeon: Vasquez Clark MD;  Location: [facility];  Service: Vascular    [place] SLCTV CATHJ 3RD+ ORD SLCTV ABDL PEL/LXTR BRNCH Left 08/07/2024    Procedure: diagnostic LLE Arteriogram;  Surgeon: Vasquez Clark MD;  Location: [facility];  Service: Vascular    PROSTATE SURGERY      TONSILLECTOMY      URINARY SPHINCTER IMPLANT         ALLERGIES:  Allergies   Allergen Reactions    Lisinopril Rash and Lip Swelling       SOCIAL HISTORY:  Social History     Substance and Sexual Activity   Alcohol Use Yes    Alcohol/week: 5.0 standard drinks of alcohol    Types: 5 Cans of beer per week    Comment: stopped last few months     Social History     Substance and Sexual Activity   Drug Use Never     Social History     Tobacco Use   Smoking Status Former    Current packs/day: 0.00    Average packs/day: 1.5 packs/day for 33.0 years (49.5 ttl pk-yrs)    Types: Cigarettes    Start date:  1956    Quit date:     Years since quittin.6    Passive exposure: Past   Smokeless Tobacco Never       FAMILY HISTORY:  Family History   Problem Relation Age of Onset    Diabetes Mother     Alcohol abuse Father     Heart disease Brother     Cancer Sister         Thyroid    Cancer Sister         Colon    Cancer Brother         Throat    Cancer Brother     Diabetes Sister     Mental illness Neg Hx        MEDICATIONS:  Scheduled Meds:  Current Facility-Administered Medications   Medication Dose Route Frequency Provider Last Rate    acetaminophen  975 mg Oral Q8H [facility] Ezequiel Pereira, DO      albuterol  2.5 mg Nebulization Once PRN Weston Enriquez CRNA      amLODIPine  5 mg Oral Daily Ezequiel Pereira, DO      atorvastatin  40 mg Oral Daily Ezequiel Pereira, DO      cloNIDine  0.1 mg Oral Q12H Ezequiel Pereira, DO      clopidogrel  75 mg Oral Daily Ezequiel Pereira, DO      DULoxetine  30 mg Oral Daily Ezequiel Pereira, DO      fenofibrate  145 mg Oral Daily Ezequiel Pereira, DO      fentaNYL  25 mcg Intravenous Q5 Min PRN Weston Enriquez CRNA      heparin (porcine)  5,000 Units Subcutaneous Q8H [facility] Ezequiel Pereira, DO      HYDROmorphone  0.2 mg Intravenous Q10 Min PRN Weston Enriquez CRNA      insulin glargine  15 Units Subcutaneous  Ezequiel Pereira, DO      insulin lispro  1-6 Units Subcutaneous 4x Daily (with meals and at bedtime) Ezequiel Pereira, DO      isosorbide mononitrate  90 mg Oral Daily Ezequiel Pereira, DO      lactated ringers  500 mL Intravenous Once PRN Ezequiel Pereira, DO      And    lactated ringers  500 mL Intravenous Once PRN Ezequiel Pereira, DO      lactated ringers  125 mL/hr Intravenous Continuous Weston Enriquez CRNA Stopped (24 121)    lactated ringers  75 mL/hr Intravenous Continuous Ezequiel Pereira, DO 75 mL/hr (24 121)    metoprolol tartrate  25 mg Oral Q12H [facility] Ezequiel Pereira, DO      nitroglycerin  0.4 mg Sublingual Q5 Min PRN Ezequiel Pereira, DO      ondansetron  4 mg Intravenous Once PRN Weston BARRETT  MONTEZ Enriquez      ondansetron  4 mg Intravenous Q6H PRN Ezequiel Pereira, DO      oxyCODONE  10 mg Oral Q6H PRN Ezequiel Pereira, DO      oxyCODONE  5 mg Oral Q6H PRN Ezequiel Pereira, DO      pantoprazole  40 mg Oral Daily Ezequiel Pereira, DO      pentoxifylline  400 mg Oral TID With Meals Ezequiel Pereira, DO      pregabalin  100 mg Oral TID Ezequiel Pereira, DO      ranolazine  1,000 mg Oral BID Ezequiel Pereira, DO      senna  1 tablet Oral Daily Ezequiel Pereira, DO      sodium chloride  500 mL Intravenous Once PRN Ezequiel Pereira, DO      And    sodium chloride  500 mL Intravenous Once PRN Ezequiel Pereira, DO      torsemide  20 mg Oral Daily Ezequiel Pereira, DO         PRN Meds:.  albuterol    fentaNYL    HYDROmorphone    lactated ringers **AND** lactated ringers    nitroglycerin    ondansetron    ondansetron    oxyCODONE    oxyCODONE    sodium chloride **AND** sodium chloride    Continuous Infusions:lactated ringers, 125 mL/hr, Last Rate: Stopped (08/30/24 1215)  lactated ringers, 75 mL/hr, Last Rate: 75 mL/hr (08/30/24 1215)        REVIEW OF SYSTEMS:  A complete review of systems was done. Pertinent positives and negatives noted in the HPI but otherwise the review of systems is negative.    PHYSICAL EXAM:  Current Weight: Weight - Scale: 105 kg (231 lb)  First Weight: Weight - Scale: 105 kg (231 lb)  Vitals:    08/30/24 1330   BP: 121/53   Pulse: 56   Resp: 19   Temp: 97.6 °F (36.4 °C)   SpO2: 93%       Intake/Output Summary (Last 24 hours) at 8/30/2024 1357  Last data filed at 8/30/2024 1202  Gross per 24 hour   Intake 4150 ml   Output 445 ml   Net 3705 ml     General:  appears comfortable and in no acute distress   Skin:  No rash  Eyes:  Sclerae anicteric, no periorbital edema   ENT:  Moist mucous membranes  Neck:  Trachea midline, symmetric   Chest:  Clear to auscultation bilaterally with no wheezes, rales or rhonchi  CVS:  Regular rate and rhythm  Abdomen:  Soft, nontender, nondistended  Neuro:  Awake and alert  Psych:  Appropriate  affect  Extremities: no lower extremity edema     Lab Results:   Results from last 7 days   Lab Units 08/30/24  1121 08/26/24  0834   WBC Thousand/uL  --  10.15   HEMOGLOBIN g/dL  --  13.3   I STAT HEMOGLOBIN g/dl 10.2*  --    HEMATOCRIT %  --  43.1   HEMATOCRIT, ISTAT % 30*  --    PLATELETS Thousands/uL  --  258   SODIUM mmol/L  --  140   POTASSIUM mmol/L  --  4.3   CHLORIDE mmol/L  --  103   CO2 mmol/L  --  28   CO2, I-STAT mmol/L 25  --    BUN mg/dL  --  33*   CREATININE mg/dL  --  1.57*   CALCIUM mg/dL  --  9.5   GLUCOSE, ISTAT mg/dl 206*  --        Radiology Results:   No orders to display

## 2024-08-30 NOTE — OP NOTE
DATE OF PROCEDURE: 08/30/24     PERFORMING SERVICE: Vascular and Endovascular Surgery    ATTENDING SURGEON: Vasquez Clark MD    PREOPERATIVE DIAGNOSIS: Left lower extremity critical limb threatening ischemia with tissue loss by way of diabetic foot ulcer    POSTOPERATIVE DIAGNOSIS: Same    PROCEDURE NAME:    Left distal superficial femoral artery bypass to below knee popliteal artery bypass with 6mm ringed PTFE    ANESTHESIA: General    EBL: 250 cc    UOP:  115 cc    IVF:  2000 cc     SPECIMENS:  None     COMPLICATIONS: None     DRAINS: None    INDICATION:  Left lower extremity wound in the form of heel and toe wounds.   The patient has a history of right lower extremity critical limb threatening ischemia with wounds status post bypass with wound healing and subsequent CryoVein occlusion.  He presents with the left lower extremity now with extensive wounds.  His noninvasive studies suggest he does not have flow to heal.  On exam he has minimal signal if any.  We scheduled him for a bypass of the left lower extremity.  He had hesitance to move forward with CryoVein due to the limited nature and patency and the fact that he has fairly debilitating rest pain of the left lower extremity as well.  We discussed the risks associated with PTFE bypass to include infection and he is willing to proceed.  The case was discussed in the presence of his daughters with whom he lives.    INTRAOPERATIVE FINDINGS: Extensively calcified vessels requiring localized endarterectomy at both the proximal and distal anastomoses.  Ultimately he developed strong multiphasic signal distally upon bypass completion.  This modified significantly with bypass occlusion to no signal.       CO-SURGEON: Dr. Jos Henderson MD,     ASSISTING SURGEON: Dr. Ezequiel Preeira DO    DESCRIPTION OF PROCEDURE:   Informed consent was obtained from the patient prior to proceeding to the operating room.  The patient was then identified in the  pre-anesthesia area, then taken to the operating room, placed in the supine position on the operating table, and induced under general endotracheal anesthesia. The patient was correctly positioned, padded at all pressure points. The operative field was then prepared and draped in a sterile fashion.  A pre-operative timeout was performed.  Preoperative antibiotics were administered at this time.      The above-knee popliteal artery exposure was approached via a longitudinal incision with a #15 blade on the medial aspect of the left thigh above the knee and a lateral to the sartorius muscle.  Dissection was carried down through the subcutaneous tissue.  The sartorius was identified and retracted medially.  The avascular plane was entered and the popliteal neurovascular bundle was isolated.  The distal SFA/popliteal artery was dissected free circumferentially and encircled with vessel loops proximally and distally.  A [place] tunneler was then used to create an anatomic tunnel between the common femoral and above-knee popliteal arteries.    We then turned our attention to the below-knee popliteal artery exposure. A longitudinal incision was made with a #15 blade on the medial aspect of the left lower extremity just below the knee and posterior to the tibia.  Dissection was carried down through the skin and subcutaneous tissue and the gastrocnemius was retracted posteriorly.  An avascular plane was entered and further blunt dissection was used to identify the neurovascular bundle.  The below-knee popliteal artery was gently  from the nerve and vein with a combination of sharp dissection with Metzenbaum scissors and blunt dissection.  The below-knee popliteal artery was then encircled proximally and distally with Silastic Vesseloops.     The patient was then systemically heparinized. ACTs were periodically checked and heparin was re-dosed to maintain ACT >250 throughout the case.    After control of the femoral  vessels with silastic vessel loops and an atraumatic vascular clamp placed on the proximally dissected SFA.  An #11 blade scalpel was used for arteriotomy and this was extended proximally and distally with Suárez scissors.  The local portion of the distal SFA/popliteal was focally endarterectomized.  Inflow was robust.  The PTFE conduit was brought onto the field and passed through the tunnel successfully.  The end of the conduit was cut to size, spatulated, and beveled.  The proximal anastomosis was completed with 5-0 running prolene suture.  The graft was then brought through the previously created tunnel ensuring that no kinks or twists were introduced during the process. Appropriate 1:1 movement of the graft was appreciated after the tunneler was removed.  The least calcified portion of the below knee popliteal artery was identified.  Proximal and distal Vesseloops were pulled up and a #11 blade was used to create a longitudinal arteriotomy. This was extended proximally distally with Suárez scissors.  A focal endarterectomy was performed and the distal endpoint was tacked with 6-0 prolene tacking sutures.  The distal aspect of the graft was was cut to size, spatulated, and beveled. The anastomosis was then created with running 6-0 prolene suture.  The native artery was backbled and the anastomosis was de-aired.     After completion of the anastomoses, the patient was given protamine. Good hemostasis was achieved in the wound beds. The wounds were closed in multiple layers of Vicryl suture, and the skin was closed with skin staples. The incisions were covered with dermabond and primapore dressings. The extremities were wrapped with kerlix and ACE wrap.    Vasquez Clark MD  08/30/24   11:45 AM    Vascular Quality Initiative - Infra-Inguinal Bypass   [id number]]          Urgency: Urgent     Side: left       Functional Status:  Fully active; able to carry on all predisease activities without  restriction.   Ambulation: Amb w/ assistance = ambulation requires use of cane, walker, person, etc      Pathology:Occlusive Disease,    Completion Assessment  Artery 1 treated: Popliteal   Left               Outflow: AT,PT,Peroneal: 2             Segments treated: 0, segment bypassed                       Was this Site previously treated?: No          TASC Grade: C          Total Treated Length: 0 cm,           Total Occluded Length: 8 cm          Calcification: Severe (calcification on both sides of artery > half length of lesion)          Number of Treatment types (Devices):   0, bypass performed          Concomitant: None          Technical result: No endovascular procedure    Anesthesia: General  ASA Class: ASA 4 - Patient with severe systemic disease that is a constant threat to life    Skin Prep:Chlorhexidene    Graft Origin: SFA    Graft Recipient: BK Pop    Graft Vein Type: None    Number of Vein Segments: None    Prosthetic: PTFE      Groin Incision: None, proximal    Total Procedure Time:   Event Time In   Procedure Start 0835   Procedure Closing 1135   Procedure Finish      EBL: 300 mL    Completion Study:   Doppler: yes   Duplex: no    Arteriogram: no

## 2024-08-30 NOTE — INTERVAL H&P NOTE
H&P reviewed. After examining the patient I find no changes in the patients condition since the H&P had been written.    Vitals:    08/30/24 0645   BP: 119/65   Pulse: (!) 51   Temp: (!) 97.4 °F (36.3 °C)   SpO2: 95%

## 2024-08-30 NOTE — ANESTHESIA PROCEDURE NOTES
Arterial Line Insertion    Performed by: Beata Giordano CRNA  Authorized by: Jose Chauhan MD  Consent: Verbal consent obtained. Written consent obtained.  Risks and benefits: risks, benefits and alternatives were discussed  Consent given by: patient  Patient understanding: patient states understanding of the procedure being performed  Patient consent: the patient's understanding of the procedure matches consent given  Procedure consent: procedure consent matches procedure scheduled  Patient identity confirmed: verbally with patient, arm band and provided demographic data  Preparation: Patient was prepped and draped in the usual sterile fashion.  Indications: multiple ABGs and hemodynamic monitoring  Orientation:  Right  Location: radial artery  Sedation:  Patient sedated: yes  Analgesia: see MAR for details  Vitals: Vital signs were monitored during sedation.    Procedure Details:  Britton's test normal: yes  Needle gauge: 20  Seldinger technique: Seldinger technique used  Number of attempts: 1    Post-procedure:  Post-procedure: dressing applied  Waveform: good waveform and waveform confirmed  Post-procedure CNS: unchanged  Patient tolerance: patient tolerated the procedure well with no immediate complications

## 2024-08-30 NOTE — ANESTHESIA PREPROCEDURE EVALUATION
Procedure:  left lower extremity above knee popliteal to below knee popliteal artery bypass (Left: Leg Upper)    Relevant Problems   CARDIO   (+) Acute deep vein thrombosis (DVT) of left peroneal vein ([facility])   (+) Coronary artery disease involving native coronary artery of native heart without angina pectoris   (+) Frequent PVCs   (+) Hypertensive urgency   (+) Mild aortic stenosis   (+) Mixed hyperlipidemia   (+) Peripheral artery disease (HCC)   (+) Primary hypertension   (+) Stable angina pectoris   (+) Type 2 diabetes mellitus with diabetic peripheral angiopathy without gangrene, with long-term current use of insulin ([facility])      ENDO   (+) Type 2 diabetes mellitus with diabetic peripheral angiopathy without gangrene, with long-term current use of insulin ([facility])   (+) Type 2 diabetes mellitus with diabetic polyneuropathy, with long-term current use of insulin ([facility])      GI/HEPATIC   (+) Duodenal ulcer   (+) Multiple gastric ulcers      /RENAL   (+) Acute kidney injury superimposed on chronic kidney disease  ([facility])   (+) Chronic kidney disease-mineral and bone disorder   (+) Stage 3b chronic kidney disease ([facility])      HEMATOLOGY   (+) Acute blood loss anemia   (+) Iron deficiency anemia   (+) Iron deficiency anemia due to chronic blood loss   (+) Symptomatic anemia      NEURO/PSYCH   (+) Stable angina pectoris      PULMONARY   (+) Shortness of breath      Blood   (+) Platelets decreased ([facility])      Surgery/Wound/Pain   (+) Diabetic ulcer of right midfoot associated with diabetes mellitus due to underlying condition, limited to breakdown of skin ([facility])   (+) Diabetic ulcer of toe of left foot associated with type 2 diabetes mellitus, limited to breakdown of skin ([facility])   (+) History of endovascular stent graft for abdominal aortic aneurysm (AAA)   (+) Hx of CABG   (+) S/P AAA repair   (+) S/P angioplasty with stent   (+) S/P prostatectomy      Cardiovascular/Peripheral Vascular   (+) Acute on chronic diastolic heart  failure (HCC)      Other   (+) Acute respiratory distress   (+) History of DVT (deep vein thrombosis)      TTE 6/17/23:  •  Left Ventricle: Left ventricular cavity size is normal. Wall thickness is moderately increased. There is mild to moderate concentric hypertrophy. The left ventricular ejection fraction is 50%. Systolic function is mildly reduced. There is mild global hypokinesis. Diastolic function is moderately abnormal, consistent with grade II (pseudonormal) relaxation.  Left atrial filling pressure is elevated.  •  IVS: There is abnormal septal motion consistent with post-operative status. There is sigmoid appearance of the septum.  •  Right Ventricle: Systolic function appears normal visually.  •  Left Atrium: The atrium is moderately dilated.  •  Aortic Valve: The aortic valve is trileaflet. The leaflets are moderately thickened. The leaflets are moderately calcified. The leaflets exhibit normal mobility. There is mild to moderate stenosis. The aortic valve peak velocity is 3.2 m/s. The aortic valve peak gradient is 41 mmHg. The aortic valve mean gradient is 25 mmHg.  •  Mitral Valve: There is mild regurgitation.  •  Tricuspid Valve: There is mild regurgitation. The right ventricular systolic pressure is moderately to severely elevated at 50-60mmHg.  •  Aorta: The aortic root is mildly dilated.    EKG April 2024:  Sinus bradycardia with 1st degree A-V block with occasional Premature ventricular complexes  Left ventricular hypertrophy with QRS widening  Cannot rule out Septal infarct , age undetermined  Abnormal ECG  When compared with ECG of 08-APR-2024 19:03, (unconfirmed)  No significant change was found  Confirmed by Amadou Carreon ([id number]) on 4/9/2024 1:23:03 PMKG:        Physical Exam    Airway    Mallampati score: II  TM Distance: >3 FB  Neck ROM: full     Dental       Cardiovascular      Pulmonary      Other Findings    Anesthesia Plan  ASA Score- 3     Anesthesia Type- general with ASA Monitors.          Additional Monitors: arterial line.    Airway Plan: ETT.           Plan Factors-    Chart reviewed. EKG reviewed. Imaging results reviewed. Existing labs reviewed. Patient summary reviewed.    Patient is not a current smoker.  Patient did not smoke on day of surgery.            Induction- intravenous.    Postoperative Plan- Plan for postoperative opioid use. Planned trial extubation    Perioperative Resuscitation Plan - Level 1 - Full Code.       Informed Consent- Anesthetic plan and risks discussed with patient.  I personally reviewed this patient with the CRNA. Discussed and agreed on the Anesthesia Plan with the CRNA..

## 2024-08-31 ENCOUNTER — APPOINTMENT (INPATIENT)
Dept: RADIOLOGY | Facility: HOSPITAL | Age: 79
DRG: 253 | End: 2024-08-31
Payer: COMMERCIAL

## 2024-08-31 LAB
ANION GAP SERPL CALCULATED.3IONS-SCNC: 5 MMOL/L (ref 4–13)
BUN SERPL-MCNC: 31 MG/DL (ref 5–25)
CALCIUM SERPL-MCNC: 8.4 MG/DL (ref 8.4–10.2)
CHLORIDE SERPL-SCNC: 110 MMOL/L (ref 96–108)
CO2 SERPL-SCNC: 25 MMOL/L (ref 21–32)
CREAT SERPL-MCNC: 1.27 MG/DL (ref 0.6–1.3)
ERYTHROCYTE [DISTWIDTH] IN BLOOD BY AUTOMATED COUNT: 16 % (ref 11.6–15.1)
GFR SERPL CREATININE-BSD FRML MDRD: 52 ML/MIN/1.73SQ M
GLUCOSE SERPL-MCNC: 127 MG/DL (ref 65–140)
GLUCOSE SERPL-MCNC: 143 MG/DL (ref 65–140)
GLUCOSE SERPL-MCNC: 149 MG/DL (ref 65–140)
GLUCOSE SERPL-MCNC: 160 MG/DL (ref 65–140)
GLUCOSE SERPL-MCNC: 178 MG/DL (ref 65–140)
HCT VFR BLD AUTO: 29.9 % (ref 36.5–49.3)
HGB BLD-MCNC: 9.3 G/DL (ref 12–17)
INR PPP: 1.33 (ref 0.85–1.19)
MCH RBC QN AUTO: 26.6 PG (ref 26.8–34.3)
MCHC RBC AUTO-ENTMCNC: 31.1 G/DL (ref 31.4–37.4)
MCV RBC AUTO: 85 FL (ref 82–98)
PLATELET # BLD AUTO: 213 THOUSANDS/UL (ref 149–390)
PMV BLD AUTO: 11.5 FL (ref 8.9–12.7)
POTASSIUM SERPL-SCNC: 4 MMOL/L (ref 3.5–5.3)
PROTHROMBIN TIME: 16.7 SECONDS (ref 12.3–15)
RBC # BLD AUTO: 3.5 MILLION/UL (ref 3.88–5.62)
SODIUM SERPL-SCNC: 140 MMOL/L (ref 135–147)
WBC # BLD AUTO: 10.86 THOUSAND/UL (ref 4.31–10.16)

## 2024-08-31 PROCEDURE — 85610 PROTHROMBIN TIME: CPT | Performed by: STUDENT IN AN ORGANIZED HEALTH CARE EDUCATION/TRAINING PROGRAM

## 2024-08-31 PROCEDURE — 80048 BASIC METABOLIC PNL TOTAL CA: CPT | Performed by: STUDENT IN AN ORGANIZED HEALTH CARE EDUCATION/TRAINING PROGRAM

## 2024-08-31 PROCEDURE — 71045 X-RAY EXAM CHEST 1 VIEW: CPT

## 2024-08-31 PROCEDURE — 82948 REAGENT STRIP/BLOOD GLUCOSE: CPT

## 2024-08-31 PROCEDURE — 85027 COMPLETE CBC AUTOMATED: CPT | Performed by: STUDENT IN AN ORGANIZED HEALTH CARE EDUCATION/TRAINING PROGRAM

## 2024-08-31 PROCEDURE — 99232 SBSQ HOSP IP/OBS MODERATE 35: CPT | Performed by: INTERNAL MEDICINE

## 2024-08-31 PROCEDURE — 94640 AIRWAY INHALATION TREATMENT: CPT

## 2024-08-31 PROCEDURE — 99024 POSTOP FOLLOW-UP VISIT: CPT | Performed by: SURGERY

## 2024-08-31 PROCEDURE — 94664 DEMO&/EVAL PT USE INHALER: CPT

## 2024-08-31 RX ORDER — ALBUTEROL SULFATE 0.83 MG/ML
2.5 SOLUTION RESPIRATORY (INHALATION) EVERY 6 HOURS PRN
Status: DISCONTINUED | OUTPATIENT
Start: 2024-08-31 | End: 2024-09-04

## 2024-08-31 RX ORDER — TORSEMIDE 20 MG/1
20 TABLET ORAL DAILY
Status: DISCONTINUED | OUTPATIENT
Start: 2024-09-01 | End: 2024-08-31

## 2024-08-31 RX ORDER — TORSEMIDE 20 MG/1
20 TABLET ORAL DAILY
Status: DISCONTINUED | OUTPATIENT
Start: 2024-08-31 | End: 2024-08-31

## 2024-08-31 RX ORDER — BUMETANIDE 0.25 MG/ML
2 INJECTION INTRAMUSCULAR; INTRAVENOUS ONCE
Status: COMPLETED | OUTPATIENT
Start: 2024-08-31 | End: 2024-08-31

## 2024-08-31 RX ADMIN — HEPARIN SODIUM 5000 UNITS: 5000 INJECTION INTRAVENOUS; SUBCUTANEOUS at 21:44

## 2024-08-31 RX ADMIN — PENTOXIFYLLINE 400 MG: 400 TABLET, EXTENDED RELEASE ORAL at 17:31

## 2024-08-31 RX ADMIN — RANOLAZINE 1000 MG: 500 TABLET, FILM COATED, EXTENDED RELEASE ORAL at 09:50

## 2024-08-31 RX ADMIN — OXYCODONE HYDROCHLORIDE 5 MG: 5 TABLET ORAL at 11:46

## 2024-08-31 RX ADMIN — BUMETANIDE 2 MG: 0.25 INJECTION INTRAMUSCULAR; INTRAVENOUS at 15:54

## 2024-08-31 RX ADMIN — PREGABALIN 100 MG: 50 CAPSULE ORAL at 15:00

## 2024-08-31 RX ADMIN — CLOPIDOGREL BISULFATE 75 MG: 75 TABLET ORAL at 09:50

## 2024-08-31 RX ADMIN — INSULIN LISPRO 1 UNITS: 100 INJECTION, SOLUTION INTRAVENOUS; SUBCUTANEOUS at 11:40

## 2024-08-31 RX ADMIN — ACETAMINOPHEN 975 MG: 325 TABLET ORAL at 05:00

## 2024-08-31 RX ADMIN — ACETAMINOPHEN 975 MG: 325 TABLET ORAL at 15:00

## 2024-08-31 RX ADMIN — ISOSORBIDE MONONITRATE 90 MG: 60 TABLET, EXTENDED RELEASE ORAL at 09:50

## 2024-08-31 RX ADMIN — ATORVASTATIN CALCIUM 40 MG: 40 TABLET, FILM COATED ORAL at 17:31

## 2024-08-31 RX ADMIN — PANTOPRAZOLE SODIUM 40 MG: 40 TABLET, DELAYED RELEASE ORAL at 05:00

## 2024-08-31 RX ADMIN — CLONIDINE HYDROCHLORIDE 0.1 MG: 0.1 TABLET ORAL at 00:00

## 2024-08-31 RX ADMIN — METOPROLOL TARTRATE 25 MG: 25 TABLET, FILM COATED ORAL at 21:44

## 2024-08-31 RX ADMIN — ASPIRIN 81 MG: 81 TABLET, COATED ORAL at 15:00

## 2024-08-31 RX ADMIN — HEPARIN SODIUM 5000 UNITS: 5000 INJECTION INTRAVENOUS; SUBCUTANEOUS at 14:59

## 2024-08-31 RX ADMIN — PENTOXIFYLLINE 400 MG: 400 TABLET, EXTENDED RELEASE ORAL at 11:40

## 2024-08-31 RX ADMIN — ALBUTEROL SULFATE 2.5 MG: 2.5 SOLUTION RESPIRATORY (INHALATION) at 15:38

## 2024-08-31 RX ADMIN — ACETAMINOPHEN 975 MG: 325 TABLET ORAL at 21:44

## 2024-08-31 RX ADMIN — INSULIN GLARGINE 15 UNITS: 100 INJECTION, SOLUTION SUBCUTANEOUS at 21:44

## 2024-08-31 RX ADMIN — CLONIDINE HYDROCHLORIDE 0.1 MG: 0.1 TABLET ORAL at 11:42

## 2024-08-31 RX ADMIN — SENNOSIDES 8.6 MG: 8.6 TABLET, FILM COATED ORAL at 09:50

## 2024-08-31 RX ADMIN — METOPROLOL TARTRATE 25 MG: 25 TABLET, FILM COATED ORAL at 09:50

## 2024-08-31 RX ADMIN — CLONIDINE HYDROCHLORIDE 0.1 MG: 0.1 TABLET ORAL at 23:51

## 2024-08-31 RX ADMIN — PREGABALIN 100 MG: 50 CAPSULE ORAL at 21:44

## 2024-08-31 RX ADMIN — DULOXETINE HYDROCHLORIDE 30 MG: 30 CAPSULE, DELAYED RELEASE ORAL at 09:50

## 2024-08-31 RX ADMIN — AMLODIPINE BESYLATE 5 MG: 5 TABLET ORAL at 09:50

## 2024-08-31 RX ADMIN — PREGABALIN 100 MG: 50 CAPSULE ORAL at 09:50

## 2024-08-31 RX ADMIN — FENOFIBRATE 145 MG: 145 TABLET ORAL at 09:50

## 2024-08-31 RX ADMIN — PENTOXIFYLLINE 400 MG: 400 TABLET, EXTENDED RELEASE ORAL at 09:53

## 2024-08-31 RX ADMIN — HEPARIN SODIUM 5000 UNITS: 5000 INJECTION INTRAVENOUS; SUBCUTANEOUS at 05:03

## 2024-08-31 RX ADMIN — RANOLAZINE 1000 MG: 500 TABLET, FILM COATED, EXTENDED RELEASE ORAL at 17:31

## 2024-08-31 NOTE — PROGRESS NOTES
Follow up Consultation    Nephrology   Thomas Horne 79 y.o. male MRN: [id number]  Unit/Bed#: [facility] 520-01 Encounter: [id number]      Physician Requesting Consult: Vasquez Medina*        ASSESSMENT:  79-year-old male with multiple comorbidities including peripheral arterial disease, hypertension, diabetes and CKD stage III admitted for elective left lower extremity vascular bypass surgery on 8/30/2024.  Nephrology consulted for perioperative optimization to reduce incidence for acute kidney injury      CKD stage III/perioperative optimization to reduce incidence for acute kidney injury:   At risk for IVY most likely secondary to ischemic injury secondary to hemodynamic perturbation intraoperatively in light of underlying comorbidities  After review of records it appears that the patient has a baseline Creatinine of 1.6-1.8 mg/dL.  Most recent labs prior to admission from 8/26/2024 with a creatinine 1.57 mg/dL  Patient's creatinine today is at 1.05 mg/dL stable and below baseline  check BMP, magnesium, phosphorus in a.m.  Hold further IV fluids for now  Okay to resume home torsemide from today  Place on a renal diet when allowed diet order.   Strict I/O.  Daily weights  Urinary retention protocol  Avoid nephrotoxins, adjust meds to appropriate GFR.  Likely has underlying CKD secondary to age-related nephron loss plus hypertensive nephrosclerosis plus diabetic kidney disease plus some component of cardiorenal syndrome.  Outpatient for nephrology follows up with Dr. Amado Velazquez for discharge from renal standpoint medically cleared in the next 24 hours if volume status and renal function stable    H&H/anemia:  most recent hemoglobin at 9.3 g/dL  Maintain hemoglobin greater than 8 grams/deciliter  Continue close monitoring for now    Acid-base electrolytes:  Acid-base electrolytes stable    Blood pressure/hypertension:   Optimize hemodynamic status to avoid delay in renal recovery.  Maintain MAP >  "65mmHg  Avoid BP fluctuations.  Home medications: Clonidine 0.1 mg p.o. every 12, Norvasc 5 mg p.o. daily, Jardiance 25 mg p.o. daily, Imdur 90 mg p.o. daily, metoprolol 25 mg p.o. every 12, torsemide 20 mg p.o. daily  Current medications: Norvasc 5 mg p.o. daily, clonidine 0.1 mg p.o. every 12, metoprolol 25 mg p.o. every 12, Imdur 90 mg p.o. daily,   Okay to resume home torsemide 20 mg p.o. daily from today    Other medical problems:  History of robotic prostatectomy 2015:  Management per primary team.  Follow-up with urology status post Dyer catheter placed in OR  Peripheral arterial disease:  Management per primary team.  Status post left distal superficial femoral artery bypass to below-knee popliteal artery bypass on 2024.  Follow-up with vascular.    Plan below is what was discussed with the primary team that they agree with:  check BMP, magnesium, phosphorus in a.m.  Hold further IV fluids for now  Okay to resume home torsemide from today 20 mg  Stable for discharge from renal standpoint medically cleared in the next 24 hours if volume status and electrolytes stable    Thanks for the consult  Will continue to follow  Please call with questions/ concerns.      Briseyda Reagan MD, FASN, 2024, 10:01 AM                Objective :   Patient seen and examined in his room status post Bumex 2 mg IV x 1 yesterday for worsening shortness of breath urine output 1.6 L feels well denies shortness of breath on nasal cannula 4 L satting 97% when I saw him.      PHYSICAL EXAM  /59   Pulse 85   Temp 98.4 °F (36.9 °C) (Oral)   Resp 18   Ht 6' 2\" (1.88 m)   Wt 105 kg (231 lb)   SpO2 95%   BMI 29.66 kg/m²   Temp (24hrs), Av.6 °F (37 °C), Min:97.4 °F (36.3 °C), Max:99.6 °F (37.6 °C)        Intake/Output Summary (Last 24 hours) at 2024 1001  Last data filed at 2024 0822  Gross per 24 hour   Intake 720 ml   Output 1630 ml   Net -910 ml       I/O last 24 hours:  In: 1275 [P.O.:900; I.V.:375]  Out: " 1630 [Urine:1630]      Current Weight: Weight - Scale: 105 kg (231 lb)  First Weight: Weight - Scale: 105 kg (231 lb)  Physical Exam  Vitals and nursing note reviewed.   Constitutional:       General: He is not in acute distress.     Appearance: Normal appearance. He is normal weight. He is not ill-appearing, toxic-appearing or diaphoretic.   HENT:      Head: Normocephalic and atraumatic.      Mouth/Throat:      Pharynx: No oropharyngeal exudate.   Eyes:      General: No scleral icterus.  Cardiovascular:      Rate and Rhythm: Normal rate.   Pulmonary:      Effort: No respiratory distress.      Breath sounds: No stridor. No wheezing.      Comments: Minimal basal crackles  Abdominal:      General: There is no distension.      Palpations: Abdomen is soft. There is no mass.      Tenderness: There is no abdominal tenderness.   Musculoskeletal:         General: No swelling.      Cervical back: Normal range of motion. No rigidity.   Skin:     General: Skin is dry.      Coloration: Skin is not jaundiced.   Neurological:      General: No focal deficit present.      Mental Status: He is alert and oriented to person, place, and time. Mental status is at baseline.   Psychiatric:         Mood and Affect: Mood normal.         Behavior: Behavior normal.             Review of Systems   Constitutional:  Negative for chills and fatigue.   HENT:  Negative for congestion.    Respiratory:  Positive for cough and shortness of breath.    Cardiovascular:  Negative for leg swelling.   Gastrointestinal:  Negative for diarrhea.   Genitourinary:  Negative for decreased urine volume.   Musculoskeletal:  Negative for back pain.   Neurological:  Negative for headaches.   Psychiatric/Behavioral:  Negative for agitation.    All other systems reviewed and are negative.      Scheduled Meds:  Current Facility-Administered Medications   Medication Dose Route Frequency Provider Last Rate    acetaminophen  975 mg Oral Q8H CONSTANTINE Pereira DO       albuterol  20 mg Nebulization Once Ezequiel Pereira, DO      albuterol  2.5 mg Nebulization Q6H PRN Vasquez Clark MD      amLODIPine  5 mg Oral Daily Tiny Parrish PA-C      aspirin  81 mg Oral Daily Ezequiel Pereira, DO      atorvastatin  40 mg Oral Daily With Dinner Ezequiel Pereira, DO      cloNIDine  0.1 mg Oral Q12H Tiny Parrish PA-C      clopidogrel  75 mg Oral Daily Ezequiel Pereira, DO      DULoxetine  30 mg Oral Daily Ezequiel Pereira, DO      fenofibrate  145 mg Oral Daily Ezequiel Pereira, DO      heparin (porcine)  5,000 Units Subcutaneous Q8H [facility] Ezequiel Pereira, DO      insulin glargine  15 Units Subcutaneous  Ezequiel Pereira, DO      insulin lispro  1-6 Units Subcutaneous 4x Daily (with meals and at bedtime) Ezequiel Pereira, DO      isosorbide mononitrate  90 mg Oral Daily Ezequiel Pereira, DO      metoprolol tartrate  25 mg Oral Q12H [facility] Ezequiel Pereira, DO      nitroglycerin  0.4 mg Sublingual Q5 Min PRN Ezequiel Pereira, DO      ondansetron  4 mg Intravenous Q6H PRN Ezequiel Pereira, DO      oxyCODONE  10 mg Oral Q6H PRN Ezequiel Pereira, DO      oxyCODONE  5 mg Oral Q6H PRN Ezequiel Pereira, DO      pantoprazole  40 mg Oral Early Morning Ezequiel Pereira, DO      pentoxifylline  400 mg Oral TID With Meals Ezequiel Pereira, DO      pregabalin  100 mg Oral TID Ezequiel Pereira, DO      ranolazine  1,000 mg Oral BID Ezequiel Pereira, DO      senna  1 tablet Oral Daily Ezequiel Pereira, DO      simethicone  80 mg Oral Q6H PRN Estelle Boucher PA-C         PRN Meds:.  albuterol    nitroglycerin    ondansetron    oxyCODONE    oxyCODONE    simethicone    Continuous Infusions:         Invasive Devices:     Invasive Devices       Peripheral Intravenous Line  Duration             Peripheral IV 08/30/24 Left;Dorsal (posterior) Hand 2 days    Peripheral IV 08/30/24 Right;Dorsal (posterior) Hand 2 days              Drain  Duration             External Urinary Catheter Other (Comment) <1 day                      LABORATORY:    Results from last 7 days   Lab  "Units 09/01/24  0551 08/31/24  0455 08/30/24  1121 08/26/24  0834   WBC Thousand/uL  --  10.86*  --  10.15   HEMOGLOBIN g/dL  --  9.3*  --  13.3   I STAT HEMOGLOBIN g/dl  --   --  10.2*  --    HEMATOCRIT %  --  29.9*  --  43.1   HEMATOCRIT, ISTAT %  --   --  30*  --    PLATELETS Thousands/uL  --  213  --  258   POTASSIUM mmol/L 3.9 4.0  --  4.3   CHLORIDE mmol/L 104 110*  --  103   CO2 mmol/L 29 25  --  28   CO2, I-STAT mmol/L  --   --  25  --    BUN mg/dL 24 31*  --  33*   CREATININE mg/dL 1.05 1.27  --  1.57*   CALCIUM mg/dL 8.9 8.4  --  9.5   GLUCOSE, ISTAT mg/dl  --   --  206*  --       rest all reviewed    RADIOLOGY:  XR chest portable    (Results Pending)     Rest all reviewed    Portions of the record may have been created with voice recognition software. Occasional wrong word or \"sound a like\" substitutions may have occurred due to the inherent limitations of voice recognition software. Read the chart carefully and recognize, using context, where substitutions have occurred.If you have any questions, please contact the dictating provider.    "

## 2024-08-31 NOTE — ASSESSMENT & PLAN NOTE
Patient is an 79-year-old male, former smoker, with PMH HTN, HLD, CAD s/p CABG, AAA s/p EVAR, type II DM, CHF, pulmonary HTN, CKD 3, prostate CA s/p prostatectomy with urinary sphincter placement, provoked LLE DVT 04/2024, and PAD. Patient admitted to [facility] on 8/30/2024 for elective left femoral to BK popliteal artery bypass with PTFE with Dr. Clark.    Plan:  -POD #1 s/p L SFA to BK popliteal artery bypass with PTFE. Left lower extremity incisions are well approximated, clean, dry and intact.  -Patient denies pain to left foot at this time, multiphasic left PT signal on exam  -Continue medical optimization with aspirin, clopidogrel, pentoxifylline and atorvastatin.  -We will discontinue warfarin at this time as patient has completed 3 months of anti-coagulation for provoked DVT and has no other indication to remain on anticoagulation.  -DVT ppx with SQ heparin  -Continue pain management with PRN oxycodone and scheduled acetaminophen  -OOB as tolerated, PT/OT evaluation.  -Tolerating diabetic diet.  -D/w

## 2024-08-31 NOTE — PROGRESS NOTES
[facility]  Progress Note  Name: Thomas Horne I  MRN: [id number]  Unit/Bed#: [facility] 520-01 I Date of Admission: 8/30/2024   Date of Service: 8/31/2024 I Hospital Day: 1    Assessment & Plan   * Peripheral artery disease (HCC)  Assessment & Plan  Patient is an 79-year-old male, former smoker, with PMH HTN, HLD, CAD s/p CABG, AAA s/p EVAR, type II DM, CHF, pulmonary HTN, CKD 3, prostate CA s/p prostatectomy with urinary sphincter placement, provoked LLE DVT 04/2024, and PAD. Patient admitted to [facility] on 8/30/2024 for elective left femoral to BK popliteal artery bypass with PTFE with Dr. Clark.    Plan:  -POD #1 s/p L SFA to BK popliteal artery bypass with PTFE. Left lower extremity incisions are well approximated, clean, dry and intact.  -Patient denies pain to left foot at this time, multiphasic left PT signal on exam  -Continue medical optimization with aspirin, clopidogrel, pentoxifylline and atorvastatin.  -We will discontinue warfarin at this time as patient has completed 3 months of anti-coagulation for provoked DVT and has no other indication to remain on anticoagulation.  -DVT ppx with SQ heparin  -Continue pain management with PRN oxycodone and scheduled acetaminophen  -OOB as tolerated, PT/OT evaluation.  -Tolerating diabetic diet.  -D/w       History of DVT (deep vein thrombosis)  Assessment & Plan  Patient has completed 3 months of anticoagulation for provoked left peroneal vein DVT. Will discontinue anticoagulation at this time.    Diabetic ulcer of right midfoot associated with diabetes mellitus due to underlying condition, limited to breakdown of skin ([facility])  Assessment & Plan  -Inpatient consult to wound care. Follows with wound care outpatient.      Type 2 diabetes mellitus with diabetic polyneuropathy, with long-term current use of insulin ([facility])  Assessment & Plan  -Continue to hold home Metformin  -ACHS blood glucose checks  -Continue home does  "Lantus, as well as ACHS insulin sliding scale    Primary hypertension  Assessment & Plan  -SBP goal 100-160  -Continue home amlodipine, clonidine, metoprolol, and isosorbide    Mixed hyperlipidemia  Assessment & Plan  -Continue atorvastatin         Subjective:  Patient is resting comfortably in bed, denying any discomfort. He denies any pain to his left foot at this time. Patient is tolerating a regular diet without issue, voiding indpendently.      Vitals:  /60   Pulse 64   Temp (!) 97.3 °F (36.3 °C) (Oral)   Resp 18   Ht 6' 2\" (1.88 m)   Wt 105 kg (231 lb)   SpO2 98%   BMI 29.66 kg/m²     I/Os:  I/O last 3 completed shifts:  In: 5275 [P.O.:245; I.V.:4780; IV Piggyback:250]  Out: 770 [Urine:470; Blood:300]  I/O this shift:  In: 180 [P.O.:180]  Out: -       Lab Results and Cultures:   CBC with diff:   Lab Results   Component Value Date    WBC 10.86 (H) 08/31/2024    HGB 9.3 (L) 08/31/2024    HCT 29.9 (L) 08/31/2024    MCV 85 08/31/2024     08/31/2024    RBC 3.50 (L) 08/31/2024    MCH 26.6 (L) 08/31/2024    MCHC 31.1 (L) 08/31/2024    RDW 16.0 (H) 08/31/2024    MPV 11.5 08/31/2024    NRBC 0 08/22/2024   ,   BMP/CMP:  Lab Results   Component Value Date    SODIUM 140 08/31/2024    K 4.0 08/31/2024     (H) 08/31/2024    CO2 25 08/31/2024    CO2 25 08/30/2024    BUN 31 (H) 08/31/2024    CREATININE 1.27 08/31/2024    GLUCOSE 206 (H) 08/30/2024    CALCIUM 8.4 08/31/2024    AST 21 04/17/2024    ALT 16 04/17/2024    ALKPHOS 38 04/17/2024    EGFR 52 08/31/2024   ,   Lipid Panel: No results found for: \"CHOL\",   Coags:   Lab Results   Component Value Date    PTT 29 11/16/2023    INR 1.33 (H) 08/31/2024   ,     Blood Culture:   Lab Results   Component Value Date    BLOODCX No Growth After 5 Days. 06/16/2023    BLOODCX No Growth After 5 Days. 06/16/2023   ,   Urinalysis:   Lab Results   Component Value Date    COLORU Yellow 06/17/2023    CLARITYU Clear 06/17/2023    SPECGRAV 1.010 06/17/2023    PHUR 5.5 " "06/17/2023    LEUKOCYTESUR (A) 06/17/2023     Elevated glucose may cause decreased leukocyte values. See urine microscopic for UWBC result    NITRITE Negative 06/17/2023    GLUCOSEU >=1000 (1%) (A) 06/17/2023    KETONESU Negative 06/17/2023    BILIRUBINUR Negative 06/17/2023    BLOODU Negative 06/17/2023   ,   Urine Culture: No results found for: \"URINECX\",   Wound Culure:   Lab Results   Component Value Date    WOUNDCULT 4+ Growth of Klebsiella pneumoniae (A) 08/08/2024    WOUNDCULT 3+ Growth of Citrobacter koseri (A) 08/08/2024    WOUNDCULT 2+ Growth of Pseudomonas aeruginosa (A) 08/08/2024    WOUNDCULT 2+ Growth of 08/08/2024       Medications:  Current Facility-Administered Medications   Medication Dose Route Frequency    acetaminophen (TYLENOL) tablet 975 mg  975 mg Oral Q8H CONSTANTINE    amLODIPine (NORVASC) tablet 5 mg  5 mg Oral Daily    atorvastatin (LIPITOR) tablet 40 mg  40 mg Oral Daily With Dinner    cloNIDine (CATAPRES) tablet 0.1 mg  0.1 mg Oral Q12H    clopidogrel (PLAVIX) tablet 75 mg  75 mg Oral Daily    DULoxetine (CYMBALTA) delayed release capsule 30 mg  30 mg Oral Daily    fenofibrate (TRICOR) tablet 145 mg  145 mg Oral Daily    heparin (porcine) subcutaneous injection 5,000 Units  5,000 Units Subcutaneous Q8H CONSTANTINE    insulin glargine (LANTUS) subcutaneous injection 15 Units 0.15 mL  15 Units Subcutaneous HS    insulin lispro (HumALOG/ADMELOG) 100 units/mL subcutaneous injection 1-6 Units  1-6 Units Subcutaneous 4x Daily (with meals and at bedtime)    isosorbide mononitrate (IMDUR) 24 hr tablet 90 mg  90 mg Oral Daily    lactated ringers bolus 500 mL  500 mL Intravenous Once PRN    And    lactated ringers bolus 500 mL  500 mL Intravenous Once PRN    metoprolol tartrate (LOPRESSOR) tablet 25 mg  25 mg Oral Q12H [facility]    nitroglycerin (NITROSTAT) SL tablet 0.4 mg  0.4 mg Sublingual Q5 Min PRN    ondansetron (ZOFRAN) injection 4 mg  4 mg Intravenous Q6H PRN    oxyCODONE (ROXICODONE) immediate release tablet " 10 mg  10 mg Oral Q6H PRN    oxyCODONE (ROXICODONE) IR tablet 5 mg  5 mg Oral Q6H PRN    pantoprazole (PROTONIX) EC tablet 40 mg  40 mg Oral Early Morning    pentoxifylline (TRENtal) ER tablet 400 mg  400 mg Oral TID With Meals    pregabalin (LYRICA) capsule 100 mg  100 mg Oral TID    ranolazine (RANEXA) 12 hr tablet 1,000 mg  1,000 mg Oral BID    senna (SENOKOT) tablet 8.6 mg  1 tablet Oral Daily    simethicone (MYLICON) chewable tablet 80 mg  80 mg Oral Q6H PRN    sodium chloride 0.9 % bolus 500 mL  500 mL Intravenous Once PRN    And    sodium chloride 0.9 % bolus 500 mL  500 mL Intravenous Once PRN       Physical Exam:    General: NAD  CV: regular rate and rhythm  Respiratory: normal effort  Abdominal: soft, nontender, nondistended  Extremities: warm and well perfused, left thigh and proximal calf dressing are clean dry and intact with no strike through noted  Neurologic: alert and oriented, no focal deficit      Pulse exam:  DP: Left: doppler signal  PT: Left: doppler signal    APOLLO Brown  8/31/2024

## 2024-08-31 NOTE — ASSESSMENT & PLAN NOTE
-Continue to hold home Metformin  -ACHS blood glucose checks  -Continue home does Lantus, as well as ACHS insulin sliding scale

## 2024-08-31 NOTE — RESPIRATORY THERAPY NOTE
RT Protocol Note  Thomas Horne 79 y.o. male MRN: [id number]  Unit/Bed#: [facility] 520-01 Encounter: [id number]    Assessment    Principal Problem:    Peripheral artery disease (HCC)  Active Problems:    Mixed hyperlipidemia    Primary hypertension    Type 2 diabetes mellitus with diabetic polyneuropathy, with long-term current use of insulin (HCC)    Diabetic ulcer of right midfoot associated with diabetes mellitus due to underlying condition, limited to breakdown of skin (HCC)    History of DVT (deep vein thrombosis)      Home Pulmonary Medications:  N/a       Past Medical History:   Diagnosis Date    Anemia     CAD (coronary artery disease)     Callus     Cancer (HCC)     prostate    CHF (congestive heart failure) (HCC)     Chronic kidney disease     Clotting disorder (HCC)     Coronary artery disease     Deep vein thrombosis (HCC)     Diabetes mellitus (HCC)     Difficulty walking     Duodenal ulcer     Heart disease     Hyperlipidemia     Hypertension     Myocardial infarction (HCC)     Neuropathy     Bilateral feet    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)     Plantar fasciitis     Sleep apnea     Could not tolerate CPAP     Social History     Socioeconomic History    Marital status:      Spouse name: None    Number of children: 2    Years of education: None    Highest education level: Some college, no degree   Occupational History    None   Tobacco Use    Smoking status: Former     Current packs/day: 0.00     Average packs/day: 1.5 packs/day for 33.0 years (49.5 ttl pk-yrs)     Types: Cigarettes     Start date: 1956     Quit date:      Years since quittin.6     Passive exposure: Past    Smokeless tobacco: Never   Vaping Use    Vaping status: Never Used   Substance and Sexual Activity    Alcohol use: Yes     Alcohol/week: 5.0 standard drinks of alcohol     Types: 5 Cans of beer per week     Comment: stopped last few months    Drug use: Never    Sexual activity: Not Currently     Partners: Female  "    Birth control/protection: Male Sterilization   Other Topics Concern    None   Social History Narrative    None     Social Determinants of Health     Financial Resource Strain: Low Risk  (10/31/2023)    Overall Financial Resource Strain (CARDIA)     Difficulty of Paying Living Expenses: Not hard at all   Food Insecurity: No Food Insecurity (4/9/2024)    Hunger Vital Sign     Worried About Running Out of Food in the Last Year: Never true     Ran Out of Food in the Last Year: Never true   Transportation Needs: No Transportation Needs (4/9/2024)    PRAPARE - Transportation     Lack of Transportation (Medical): No     Lack of Transportation (Non-Medical): No   Physical Activity: Not on file   Stress: Not on file   Social Connections: Not on file   Intimate Partner Violence: Not on file   Housing Stability: Low Risk  (4/9/2024)    Housing Stability Vital Sign     Unable to Pay for Housing in the Last Year: No     Number of Times Moved in the Last Year: 1     Homeless in the Last Year: No       Subjective         Objective    Physical Exam:   Assessment Type: Assess only  Respiratory Pattern: Normal, Dyspnea at rest  Chest Assessment: Chest expansion symmetrical  Bilateral Breath Sounds: Inspiratory wheezes  Cough: Strong    Vitals:  Blood pressure 166/88, pulse 92, temperature 99 °F (37.2 °C), temperature source Oral, resp. rate 16, height 6' 2\" (1.88 m), weight 105 kg (231 lb), SpO2 96%.          Imaging and other studies:           Plan    Respiratory Plan: Mild Distress pathway        Resp Comments: Was called to pt room for inspiratory wheezing per nurse. Was ordered continuous neb by fellow but pt heart rate was already in the 100s. Discussed with care team about one time prn albuterol dosage and placed on midflow nasal cannual at 15L initially and weaned to 10L post tx. Physician Assistant was at bed side to assess and pt stated he felt better after prn treatment. will contonue with  prn order   " xavi

## 2024-08-31 NOTE — ASSESSMENT & PLAN NOTE
Patient has completed 3 months of anticoagulation for provoked left peroneal vein DVT. Will discontinue anticoagulation at this time.

## 2024-08-31 NOTE — QUICK NOTE
Post Op Check Note - Vascular Surgery  Thomas Horne 79 y.o. male MRN: [id number]  Unit/Bed#: [facility] 520-01 Encounter: [id number]    ASSESSMENT:  Thomas Horne is a 79 y.o. male who is status post L SFA bypass to BK pop with PTFE    Subjective: Patient feels well with no complaints    Physical Exam:  GEN: NAD  CV: RRR  Lung: Normal effort  Ab: Soft, NT/ND  Neuro: A+Ox3  Incisions: dressings c/d/i          Invasive Devices       Peripheral Intravenous Line  Duration             Peripheral IV 08/30/24 Left;Dorsal (posterior) Hand <1 day    Peripheral IV 08/30/24 Right;Dorsal (posterior) Hand <1 day              Arterial Line  Duration             Arterial Line 08/30/24 Right Radial <1 day                    VTE Pharmacologic Prophylaxis: Heparin

## 2024-08-31 NOTE — PROGRESS NOTES
Pt became SOB when attempting to get OOB, desat to low 80's. Increased productive cough, wheezing, chills. Vascular and RT aware and en route, see new orders.

## 2024-09-01 ENCOUNTER — TELEPHONE (OUTPATIENT)
Dept: OTHER | Facility: HOSPITAL | Age: 79
End: 2024-09-01

## 2024-09-01 LAB
ANION GAP SERPL CALCULATED.3IONS-SCNC: 8 MMOL/L (ref 4–13)
BUN SERPL-MCNC: 24 MG/DL (ref 5–25)
CALCIUM SERPL-MCNC: 8.9 MG/DL (ref 8.4–10.2)
CHLORIDE SERPL-SCNC: 104 MMOL/L (ref 96–108)
CO2 SERPL-SCNC: 29 MMOL/L (ref 21–32)
CREAT SERPL-MCNC: 1.05 MG/DL (ref 0.6–1.3)
GFR SERPL CREATININE-BSD FRML MDRD: 66 ML/MIN/1.73SQ M
GLUCOSE SERPL-MCNC: 101 MG/DL (ref 65–140)
GLUCOSE SERPL-MCNC: 133 MG/DL (ref 65–140)
GLUCOSE SERPL-MCNC: 143 MG/DL (ref 65–140)
GLUCOSE SERPL-MCNC: 156 MG/DL (ref 65–140)
GLUCOSE SERPL-MCNC: 72 MG/DL (ref 65–140)
GLUCOSE SERPL-MCNC: 76 MG/DL (ref 65–140)
POTASSIUM SERPL-SCNC: 3.9 MMOL/L (ref 3.5–5.3)
SODIUM SERPL-SCNC: 141 MMOL/L (ref 135–147)

## 2024-09-01 PROCEDURE — 97167 OT EVAL HIGH COMPLEX 60 MIN: CPT

## 2024-09-01 PROCEDURE — 80048 BASIC METABOLIC PNL TOTAL CA: CPT | Performed by: INTERNAL MEDICINE

## 2024-09-01 PROCEDURE — 97530 THERAPEUTIC ACTIVITIES: CPT

## 2024-09-01 PROCEDURE — 97163 PT EVAL HIGH COMPLEX 45 MIN: CPT

## 2024-09-01 PROCEDURE — 82948 REAGENT STRIP/BLOOD GLUCOSE: CPT

## 2024-09-01 PROCEDURE — 99024 POSTOP FOLLOW-UP VISIT: CPT | Performed by: SURGERY

## 2024-09-01 PROCEDURE — 99232 SBSQ HOSP IP/OBS MODERATE 35: CPT | Performed by: INTERNAL MEDICINE

## 2024-09-01 RX ORDER — TORSEMIDE 20 MG/1
20 TABLET ORAL DAILY
Status: DISCONTINUED | OUTPATIENT
Start: 2024-09-01 | End: 2024-09-04 | Stop reason: HOSPADM

## 2024-09-01 RX ADMIN — PENTOXIFYLLINE 400 MG: 400 TABLET, EXTENDED RELEASE ORAL at 09:33

## 2024-09-01 RX ADMIN — ACETAMINOPHEN 975 MG: 325 TABLET ORAL at 05:40

## 2024-09-01 RX ADMIN — INSULIN GLARGINE 15 UNITS: 100 INJECTION, SOLUTION SUBCUTANEOUS at 21:50

## 2024-09-01 RX ADMIN — CLONIDINE HYDROCHLORIDE 0.1 MG: 0.1 TABLET ORAL at 11:59

## 2024-09-01 RX ADMIN — HEPARIN SODIUM 5000 UNITS: 5000 INJECTION INTRAVENOUS; SUBCUTANEOUS at 14:58

## 2024-09-01 RX ADMIN — PREGABALIN 100 MG: 50 CAPSULE ORAL at 09:33

## 2024-09-01 RX ADMIN — METOPROLOL TARTRATE 25 MG: 25 TABLET, FILM COATED ORAL at 09:33

## 2024-09-01 RX ADMIN — FENOFIBRATE 145 MG: 145 TABLET ORAL at 09:33

## 2024-09-01 RX ADMIN — DULOXETINE HYDROCHLORIDE 30 MG: 30 CAPSULE, DELAYED RELEASE ORAL at 09:33

## 2024-09-01 RX ADMIN — PREGABALIN 100 MG: 50 CAPSULE ORAL at 21:49

## 2024-09-01 RX ADMIN — ASPIRIN 81 MG: 81 TABLET, COATED ORAL at 09:33

## 2024-09-01 RX ADMIN — INSULIN LISPRO 1 UNITS: 100 INJECTION, SOLUTION INTRAVENOUS; SUBCUTANEOUS at 21:53

## 2024-09-01 RX ADMIN — PENTOXIFYLLINE 400 MG: 400 TABLET, EXTENDED RELEASE ORAL at 12:00

## 2024-09-01 RX ADMIN — PANTOPRAZOLE SODIUM 40 MG: 40 TABLET, DELAYED RELEASE ORAL at 05:40

## 2024-09-01 RX ADMIN — SENNOSIDES 8.6 MG: 8.6 TABLET, FILM COATED ORAL at 09:32

## 2024-09-01 RX ADMIN — METOPROLOL TARTRATE 25 MG: 25 TABLET, FILM COATED ORAL at 21:49

## 2024-09-01 RX ADMIN — HEPARIN SODIUM 5000 UNITS: 5000 INJECTION INTRAVENOUS; SUBCUTANEOUS at 21:50

## 2024-09-01 RX ADMIN — AMLODIPINE BESYLATE 5 MG: 5 TABLET ORAL at 09:33

## 2024-09-01 RX ADMIN — ATORVASTATIN CALCIUM 40 MG: 40 TABLET, FILM COATED ORAL at 16:37

## 2024-09-01 RX ADMIN — PENTOXIFYLLINE 400 MG: 400 TABLET, EXTENDED RELEASE ORAL at 16:37

## 2024-09-01 RX ADMIN — CLOPIDOGREL BISULFATE 75 MG: 75 TABLET ORAL at 09:33

## 2024-09-01 RX ADMIN — HEPARIN SODIUM 5000 UNITS: 5000 INJECTION INTRAVENOUS; SUBCUTANEOUS at 05:40

## 2024-09-01 RX ADMIN — ACETAMINOPHEN 975 MG: 325 TABLET ORAL at 14:58

## 2024-09-01 RX ADMIN — TORSEMIDE 20 MG: 20 TABLET ORAL at 12:00

## 2024-09-01 RX ADMIN — RANOLAZINE 1000 MG: 500 TABLET, FILM COATED, EXTENDED RELEASE ORAL at 09:33

## 2024-09-01 RX ADMIN — PREGABALIN 100 MG: 50 CAPSULE ORAL at 15:00

## 2024-09-01 RX ADMIN — ACETAMINOPHEN 975 MG: 325 TABLET ORAL at 21:48

## 2024-09-01 RX ADMIN — ISOSORBIDE MONONITRATE 90 MG: 60 TABLET, EXTENDED RELEASE ORAL at 09:33

## 2024-09-01 RX ADMIN — OXYCODONE HYDROCHLORIDE 5 MG: 5 TABLET ORAL at 09:47

## 2024-09-01 RX ADMIN — RANOLAZINE 1000 MG: 500 TABLET, FILM COATED, EXTENDED RELEASE ORAL at 16:37

## 2024-09-01 NOTE — PHYSICAL THERAPY NOTE
Physical Therapy Evaluation     Patient's Name: Thomas Horne    Admitting Diagnosis  Peripheral arteriosclerosis (HCC) [I70.209]    Problem List  Patient Active Problem List   Diagnosis    Mixed hyperlipidemia    Primary hypertension    Coronary artery disease involving native coronary artery of native heart without angina pectoris    S/P angioplasty with stent    Hx of CABG    S/P AAA repair    S/P prostatectomy    H/O prostate cancer    Type 2 diabetes mellitus with diabetic polyneuropathy, with long-term current use of insulin (HCC)    Varicose veins of left lower extremity    History of endovascular stent graft for abdominal aortic aneurysm (AAA)    Bruit (arterial)    Peripheral artery disease (HCC)    Type 2 diabetes mellitus with diabetic peripheral angiopathy without gangrene, with long-term current use of insulin (HCC)    Actinic keratoses    Acute kidney injury superimposed on chronic kidney disease  (HCC)    Platelets decreased (HCC)    Gross hematuria    Chronic HFpEF/Moderate pHTN (HFpEF) (HCC)    Mild aortic stenosis    Chronic kidney disease-mineral and bone disorder    Stable angina pectoris    Hypertensive urgency    Elevated troponin level not due myocardial infarction    Diabetic ulcer of right midfoot associated with diabetes mellitus due to underlying condition, limited to breakdown of skin (HCC)    Acute on chronic diastolic heart failure (HCC)    Stage 3b chronic kidney disease (HCC)    Frequent PVCs    Acute respiratory distress    Melena    Symptomatic anemia    Multiple gastric ulcers    Iron deficiency anemia due to chronic blood loss    Duodenal ulcer    Acute blood loss anemia    History of colon polyps    Shortness of breath    Iron deficiency anemia    Acute deep vein thrombosis (DVT) of left peroneal vein (HCC)    Plantar fasciitis, right    Diabetic ulcer of toe of left foot associated with type 2 diabetes mellitus, limited to breakdown of skin (HCC)    History of DVT (deep vein  thrombosis)       Past Medical History  Past Medical History:   Diagnosis Date    Anemia     CAD (coronary artery disease)     Callus     Cancer (HCC)     prostate    CHF (congestive heart failure) (HCC)     Chronic kidney disease     Clotting disorder (HCC)     Coronary artery disease 1988    Deep vein thrombosis (HCC)     Diabetes mellitus (HCC)     Difficulty walking     Duodenal ulcer     Heart disease 1988    Hyperlipidemia     Hypertension     Myocardial infarction (HCC)     Neuropathy     Bilateral feet    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)     Plantar fasciitis     Sleep apnea     Could not tolerate CPAP       Past Surgical History  Past Surgical History:   Procedure Laterality Date    ABDOMINAL AORTIC ANEURYSM REPAIR      Stented    ADENOIDECTOMY      BACK SURGERY  1985    CARDIAC CATHETERIZATION Left 10/19/2022    Procedure: Cardiac Left Heart Cath;  Surgeon: Danielle Pereira MD;  [facility];  Service: Cardiology    CARDIAC SURGERY  2002    3 cardiac bypass then angioplasty 7/2020    CHOLECYSTECTOMY      COLONOSCOPY  02/14/2024    CORONARY ARTERY BYPASS GRAFT      IR LOWER EXTREMITY ANGIOGRAM  11/01/2023    IR LOWER EXTREMITY ANGIOGRAM  08/07/2024    LAMINECTOMY  1990    PA BYPASS W/VEIN FEMORAL-POPLITEAL Right 11/16/2023    Procedure: BYPASS FEMORAL-POPLITEAL WITH CRYO VEIN, RIGHT FEMORAL ENDARTERECTOMY;  Surgeon: Vasquez Clark MD;  Location: [facility];  Service: Vascular    PA SLCTV CATHJ 3RD+ ORD SLCTV ABDL PEL/LXTR BRNCH Right 11/01/2023    Procedure: ARTERIOGRAM Right lower extremity arteriogram with CO2 via right groin access;  Surgeon: Vasquez Clark MD;  Location: [facility];  Service: Vascular    PA SLCTV CATHJ 3RD+ ORD SLCTV ABDL PEL/LXTR BRNCH Left 08/07/2024    Procedure: diagnostic LLE Arteriogram;  Surgeon: Vasquez Clark MD;  Location: [facility];  Service: Vascular    PROSTATE SURGERY      TONSILLECTOMY      URINARY SPHINCTER IMPLANT             09/01/24 0858   PT Last Visit   PT Visit Date 09/01/24   Note Type   Note type Evaluation   Pain Assessment   Pain Assessment Tool 0-10   Pain Score 2   Pain Location/Orientation Orientation: Left;Location: Leg   Pain Onset/Description Onset: Ongoing;Frequency: Constant/Continuous;Descriptor: Discomfort   Effect of Pain on Daily Activities limits comfort and mobility   Patient's Stated Pain Goal No pain   Hospital Pain Intervention(s) Repositioned;Ambulation/increased activity;Emotional support   Restrictions/Precautions   Weight Bearing Precautions Per Order No   Other Precautions Chair Alarm;Bed Alarm;Fall Risk;Multiple lines;Telemetry;O2;Pain   Home Living   Type of Home House   Home Layout Two level;1/2 bath on main level;Bed/bath upstairs;Access;Stairs to enter with rails  (4 GABE - 17 steps to 2nd)   Bathroom Shower/Tub Walk-in shower   Bathroom Toilet Raised   Bathroom Equipment Shower chair   Bathroom Accessibility Accessible   Home Equipment Walker;Cane  (does not use PTA)   Prior Function   Level of [place] Independent with ADLs;Independent with functional mobility;Independent with IADLS   Lives With Daughter   Receives Help From Family   IADLs Independent with driving;Independent with meal prep;Independent with medication management   Falls in the last 6 months 0   Vocational Retired   General   Family/Caregiver Present No   Cognition   Overall Cognitive Status WFL   Arousal/Participation Alert   Attention Within functional limits   Orientation Level Oriented X4   Memory Within functional limits   Following Commands Follows one step commands without difficulty   Comments Patient pleasant and cooperative.   Subjective   Subjective Patient agreeable to PT eval   RUE Assessment   RUE Assessment WFL   LUE Assessment   LUE Assessment WFL   RLE Assessment   RLE Assessment X   Strength RLE   RLE Overall Strength 4-/5   LLE Assessment   LLE Assessment X   Strength LLE   LLE Overall Strength  3+/5  (limited by pain - able to WB)   Bed Mobility   Supine to Sit 4  Minimal assistance   Additional items Assist x 1;Increased time required;Verbal cues   Transfers   Sit to Stand 4  Minimal assistance   Additional items Assist x 1;Increased time required;Verbal cues   Stand to Sit 4  Minimal assistance   Additional items Assist x 1;Increased time required;Verbal cues   Toilet transfer 4  Minimal assistance   Additional items Assist x 1;Increased time required;Verbal cues;Commode  (Commode over std toilet)   Additional Comments RW   Ambulation/Elevation   Gait pattern Improper Weight shift;Antalgic;Forward Flexion;Decreased foot clearance;Shuffling;Short stride;Step to;Excessively slow   Gait Assistance 4  Minimal assist   Additional items Assist x 1;Verbal cues   Assistive Device Rolling walker   Distance 10'x2   Balance   Static Sitting Fair +   Dynamic Sitting Fair +   Static Standing Fair -   Dynamic Standing Poor +   Ambulatory Poor +   Endurance Deficit   Endurance Deficit Yes   Endurance Deficit Description fatigue, weakness, pain   Activity Tolerance   Activity Tolerance Patient limited by fatigue;Patient limited by pain   Medical Staff Made Aware OT   Nurse Made Aware RN cleared   Assessment   Prognosis Good   Problem List Decreased strength;Decreased endurance;Impaired balance;Decreased mobility;Pain   Assessment Pt is a 79 y.o. male seen for a high complexity PT evaluation due to Ongoing medical management for primary dx, Increased reliance on more restrictive AD compared to baseline, Decreased activity tolerance compared to baseline, Fall risk, Increased assistance needed from caregiver at current time, Increased O2 via NC from pts baseline, s/p surgical intervention. Patient is s/p admit to [facility] on [facility]/30/2024 for Peripheral arteriosclerosis (HCC) (I70.209). Patient  has a past medical history of Anemia, CAD (coronary artery disease), Callus, Cancer (HCC), CHF (congestive heart failure)  (HCC), Chronic kidney disease, Clotting disorder (HCC), Coronary artery disease, Deep vein thrombosis (HCC), Diabetes mellitus (HCC), Difficulty walking, Duodenal ulcer, Heart disease, Hyperlipidemia, Hypertension, Myocardial infarction (HCC), Neuropathy, 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), Plantar fasciitis, and Sleep apnea..     PT now consulted to assess functional mobility and needs for safe d/c planning. Prior to admission, pt independent with functional mobility, independent ADLs, and independent IADLs. Personal factors affecting status include fall risk, steps to enter home, steps to negotiate within home, and medical status     Currently pt requires minimal assistance x1 for bed mobility, minimal assistance x1 for functional transfers with rolling walker ; minimal assistance x1 for ambulation with rolling walker. Pt presents functioning below baseline and w/ overall mobility deficits 2* to: decreased strength, decreased endurance, decreased mobility, impaired balance. These impairments place pt at risk for falls.     Pt will continue to benefit from skilled PT interventions to address stated impairments; to maximize functional potential; for ongoing pt/family education; and DME needs. The patient's AM-PAC Basic Mobility Inpatient Short Form Raw Score Is 17. PT is currently anticipating Level 3 - Minimum Resource Intensity (pending safe stair trial) on d/c from hospital. Will continue to follow as able.   Barriers to Discharge Inaccessible home environment   Goals   Patient Goals to go home   STG Expiration Date 09/15/24   Short Term Goal #1 In 14 days, patient will 1) increase strength in BUE/BLE by 1/2 to 1 full grade for increased strength and stability needed for functional mobility 2) improve bed mobility to MI for improved mobility and decreased need for assist 3) sit EOB x30' with MI to facilitate trunk stability and safety for completion of ADL tasks 4) increase functional transfers to MI for improved  safety and functional mobility 5) ambulate 250ft with MI using LRAD for increased endurance and safety ambulating home and community environments 6) improve balance by 1 grade for improved safety and stability and decreased risk for falls. 7) ascend/descend a full flight of stairs using HR with MI in order to safely access home environment   PT Treatment Day 0   Plan   Treatment/Interventions ADL retraining;Functional transfer training;LE strengthening/ROM;Therapeutic exercise;Endurance training;Patient/family training;Equipment eval/education;Bed mobility;Gait training;Spoke to nursing;Spoke to case management;OT;Elevations   PT Frequency 3-5x/wk   Discharge Recommendation   Rehab Resource Intensity Level, PT III (Minimum Resource Intensity)  (anticipating level 3 pending safe stair trial)   AM-PAC Basic Mobility Inpatient   Turning in Flat Bed Without Bedrails 3   Lying on Back to Sitting on Edge of Flat Bed Without Bedrails 3   Moving Bed to Chair 3   Standing Up From Chair Using Arms 3   Walk in Room 3   Climb 3-5 Stairs With Railing 2   Basic Mobility Inpatient Raw Score 17   Basic Mobility Standardized Score 39.67   [facility] Highest Level Of Mobility   -HLM Goal 5: Stand one or more mins   -HLM Achieved 6: Walk 10 steps or more   Modified Loulou Scale   Modified Loulou Scale 4   End of Consult   Patient Position at End of Consult All needs within reach;Bedside chair;Bed/Chair alarm activated     Additional Treatment Session   Start Time 0848   End Time 0858   Treatment Assessment Additional tx session used for toilet transfer, transfer training, and increased mobility. Patient able to ambulate to and from bathroom with Min Ax1 but limited by pain in LLE. Patient with antalgic gait pattern. Patient attempts toilet transfer to std toilet, but is unable to desend to seat due to low height. Bedside commode aquired for patient and raised to max height for over toilet. Patient completes toilet transfer with  "Min A from raised height. Patient with +BM and void. Patient incontinent of urine due to \"sphincter\" problems. Increased time needed for linen change and hygiene. Patient able to ambulate back to bedside chair with Min A. Patient performs x6 STS throughout session. Patient left in bedside chair with all needs met and call bell within reach.       Anya Dexter, PT, DPT    "

## 2024-09-01 NOTE — UTILIZATION REVIEW
Initial Clinical Review    Elective IP surgical procedure  Age/Sex: 79 y.o. male  Surgery Date: 8/30/2024  Procedure:  Left distal superficial femoral artery bypass to below knee popliteal artery bypass with 6mm ringed PTFE  Anesthesia: General    POD#1 Progress Note: s/p L SFA to BK popliteal artery bypass with PTFE. LLE incisions are well approximated, c/d/I. Denies pain to left foot at this time, ultiphasic left PT signal on exam. Continue aspirin, clopidogrel, pentoxifylline and atorvastatin. D/c warfarin at this time as pt has completed 3 months of anti-coagulation for provoked DVT and has no other indication to remain on AC.DVT ppx with SQ heparin.  Continue PRN oxycodone and scheduled acetaminophen. OOB as tolerated, PT/OT evals. Tolerating diabetic diet. Consult wound care for diabetic ulcer of R midfoot as follows as op. Continue Lantus inc, accu-checks w/ ssi. Continue amlodipine, clonidine, metoprolol, and isosorbide.     Admission Orders: Date/Time/Statement:   Admission Orders (From admission, onward)       Ordered        08/30/24 1232  Inpatient Admission  Once                          Orders Placed This Encounter   Procedures    Inpatient Admission     Standing Status:   Standing     Number of Occurrences:   1     Order Specific Question:   Level of Care     Answer:   Level 1 Stepdown [13]     Order Specific Question:   Estimated length of stay     Answer:   Inpatient Only Surgery       Vital Signs (last 3 days)       Date/Time Temp Pulse Resp BP MAP (mmHg) Arterial Line BP MAP SpO2 Calculated FIO2 (%) - Nasal Cannula O2 Flow Rate (L/min) Nasal Cannula O2 Flow Rate (L/min) O2 Device Cardiac (WDL) Patient Position - Orthostatic VS [place] Coma Scale Score Pain    08/31/24 2000 -- -- -- -- -- -- -- -- -- -- -- -- -- -- 15 --    08/31/24 19:15:02 99.5 °F (37.5 °C) 72 23 130/60 86 -- -- 94 % 52 -- 8 L/min Mid flow nasal cannula -- Lying -- --    08/31/24 1733 -- -- -- -- -- -- -- 95 % -- 6 L/min -- Nasal  cannula -- -- -- --    08/31/24 1603 -- -- -- -- -- -- -- 98 % -- -- -- -- -- -- -- --    08/31/24 1600 -- -- -- -- -- -- -- -- -- -- -- -- -- -- 15 --    08/31/24 1544 -- -- -- -- -- -- -- 96 % -- 15 L/min -- -- -- -- -- --    08/31/24 1500 99 °F (37.2 °C) 92 16 166/88 120 -- -- 91 % -- 6 L/min -- Nasal cannula -- Lying -- 5    08/31/24 1200 -- -- -- -- -- -- -- -- -- -- -- -- -- -- 15 --    08/31/24 1146 -- -- -- -- -- -- -- -- -- -- -- -- -- -- -- 7 08/31/24 1049 97.4 °F (36.3 °C) 65 18 143/61 -- -- -- 99 % -- -- -- -- -- -- -- --    08/31/24 0950 -- 64 -- 127/60 -- -- -- -- -- -- -- -- -- -- -- --    08/31/24 0800 -- -- -- -- -- -- -- -- -- -- -- -- -- -- 15 --    08/31/24 0700 97.3 °F (36.3 °C) 57 18 121/60 -- -- -- 98 % -- -- -- -- -- -- -- --    08/31/24 0600 -- -- -- -- -- 140/40 70 mmHg -- -- -- -- -- -- -- -- --    08/31/24 0400 -- -- -- -- -- 150/44 74 mmHg -- -- -- -- -- -- -- 15 --    08/31/24 0000 -- -- -- -- -- 126/42 68 mmHg -- -- -- -- -- -- -- 15 --    08/30/24 2300 97.6 °F (36.4 °C) 75 18 152/71 102 -- -- 96 % -- -- -- None (Room air) -- Lying -- --    08/30/24 22:59:45 97.6 °F (36.4 °C) -- 18 152/71 -- -- -- 96 % -- -- -- -- -- -- -- --    08/30/24 2030 -- -- -- -- -- -- -- -- -- -- -- -- -- -- 15 No Pain    08/30/24 1900 -- 61 18 109/52 -- -- -- 97 % -- -- -- -- -- -- -- --    08/30/24 1642 -- -- -- -- -- -- -- 95 % -- -- -- None (Room air) -- -- 15 --    08/30/24 1630 -- -- -- 106/51 73 124/36 62 mmHg -- -- -- -- -- -- -- -- --    08/30/24 1600 -- -- -- 102/53 75 120/32 56 mmHg -- -- -- -- -- -- -- -- --    08/30/24 1542 97.4 °F (36.3 °C) 55 -- 110/53 -- -- -- -- -- -- -- -- -- -- -- --    08/30/24 1530 -- -- -- 110/53 77 134/38 64 mmHg -- -- -- -- -- -- -- -- --    08/30/24 1500 -- -- -- 113/58 83 130/40 64 mmHg -- -- -- -- -- -- -- -- --    08/30/24 1445 -- -- -- 113/57 79 136/48 74 mmHg -- -- -- -- -- -- -- -- --    08/30/24 1430 -- -- -- 101/54 72 122/40 62 mmHg 96 % -- -- -- None (Room  air) -- -- 15 No Pain    08/30/24 1415 -- -- -- 101/50 71 128/42 66 mmHg -- -- -- -- -- -- -- -- --    08/30/24 1400 97.9 °F (36.6 °C) 54 -- 101/50 71 125/42 -- 95 % -- -- -- -- -- -- -- No Pain    08/30/24 1330 97.6 °F (36.4 °C) 56 19 121/53 77 124/47 66 mmHg 93 % 32 -- 3 L/min Nasal cannula X -- 15 No Pain    08/30/24 1315 -- 55 22 109/56 80 117/45 66 mmHg 93 % -- -- -- -- -- -- -- No Pain    08/30/24 1300 -- 56 15 117/56 80 123/48 66 mmHg 93 % -- -- -- -- -- -- 15 No Pain    08/30/24 1245 -- 57 13 115/54 78 118/54 69 mmHg 93 % -- -- -- -- -- -- 15 No Pain    08/30/24 1230 -- 59 19 134/61 88 137/54 75 mmHg 93 % 32 -- 3 L/min Nasal cannula -- -- 15 No Pain    08/30/24 1215 97.4 °F (36.3 °C) 63 18 139/61 88 144/55 85 mmHg 95 % -- 6 L/min -- Simple mask X -- 15 --    08/30/24 0651 -- -- -- -- -- -- -- -- -- -- -- -- -- -- -- 2    08/30/24 0645 97.4 °F (36.3 °C) 51 -- 119/65 -- -- -- 95 % -- -- -- None (Room air) -- -- -- --          Weight (last 2 days)       Date/Time Weight    08/30/24 0651 105 (231)            Pertinent Labs/Diagnostic Test Results:   Radiology:  XR chest portable   Final Interpretation by Kirill Rush MD (09/01 1431)      Cardiomegaly.      Pulmonary edema.            Results from last 7 days   Lab Units 08/31/24  0455 08/30/24  1121 08/26/24  0834   WBC Thousand/uL 10.86*  --  10.15   HEMOGLOBIN g/dL 9.3*  --  13.3   I STAT HEMOGLOBIN g/dl  --  10.2*  --    HEMATOCRIT % 29.9*  --  43.1   HEMATOCRIT, ISTAT %  --  30*  --    PLATELETS Thousands/uL 213  --  258     Results from last 7 days   Lab Units 08/31/24  0455 08/30/24  1121 08/26/24  0834   SODIUM mmol/L 140  --  140   POTASSIUM mmol/L 4.0  --  4.3   CHLORIDE mmol/L 110*  --  103   CO2 mmol/L 25  --  28   CO2, I-STAT mmol/L  --  25  --    ANION GAP mmol/L 5  --  9   BUN mg/dL 31*  --  33*   CREATININE mg/dL 1.27  --  1.57*   EGFR ml/min/1.73sq m 52  --  40   CALCIUM mg/dL 8.4  --  9.5   CALCIUM, IONIZED, ISTAT mmol/L  --  1.14  --           Results from last 7 days   Lab Units 08/31/24 2029 08/31/24  1543 08/31/24  1043 08/31/24  0609 08/30/24  2031 08/30/24  1554 08/30/24  1419 08/30/24  1230   POC GLUCOSE mg/dl 127 149* 178* 143* 261* 216* 204* 210*     Results from last 7 days   Lab Units 08/31/24  0455 08/26/24  0834   GLUCOSE RANDOM mg/dL 160* 114     Results from last 7 days   Lab Units 08/30/24  1121   I STAT BASE EXC mmol/L -2   I STAT O2 SAT % 93*   ISTAT PH ART  7.346*   I STAT ART PCO2 mm HG 42.5   I STAT ART PO2 mm HG 72.0*   I STAT ART HCO3 mmol/L 23.3     Results from last 7 days   Lab Units 08/31/24  0455 08/26/24  0834   PROTIME seconds 16.7* 25.3*   INR  1.33* 2.31*     Scheduled Medications:  acetaminophen, 975 mg, Oral, Q8H CONSTANTINE  albuterol, 20 mg, Nebulization, Once  amLODIPine, 5 mg, Oral, Daily  aspirin, 81 mg, Oral, Daily  atorvastatin, 40 mg, Oral, Daily With Dinner  cloNIDine, 0.1 mg, Oral, Q12H  clopidogrel, 75 mg, Oral, Daily  DULoxetine, 30 mg, Oral, Daily  fenofibrate, 145 mg, Oral, Daily  heparin (porcine), 5,000 Units, Subcutaneous, Q8H CONSTANTINE  insulin glargine, 15 Units, Subcutaneous, HS  insulin lispro, 1-6 Units, Subcutaneous, 4x Daily (with meals and at bedtime)  isosorbide mononitrate, 90 mg, Oral, Daily  metoprolol tartrate, 25 mg, Oral, Q12H CONSTANTINE  pantoprazole, 40 mg, Oral, Early Morning  pentoxifylline, 400 mg, Oral, TID With Meals  pregabalin, 100 mg, Oral, TID  ranolazine, 1,000 mg, Oral, BID  senna, 1 tablet, Oral, Daily  torsemide, 20 mg, Oral, Daily    PRN Meds:  albuterol, 2.5 mg, Nebulization, Q6H PRN 8/31 x1  nitroglycerin, 0.4 mg, Sublingual, Q5 Min PRN  ondansetron, 4 mg, Intravenous, Q6H PRN  oxyCODONE, 10 mg, Oral, Q6H PRN  oxyCODONE, 5 mg, Oral, Q6H PRN 8/31 x1  simethicone, 80 mg, Oral, Q6H PRN        Network Utilization Review Department  ATTENTION: Please call with any questions or concerns to [phone] and carefully listen to the prompts so that you are directed to the right person. All voicemails  are confidential.   For Discharge needs, contact Care Management DC Support Team at [phone] opt. [phone]  Send all requests for admission clinical reviews, approved or denied determinations and any other requests to dedicated fax number below belonging to the campus where the patient is receiving treatment. List of dedicated fax numbers for the Facilities:  FACILITY NAME UR FAX NUMBER   ADMISSION DENIALS (Administrative/Medical Necessity) [phone]   DISCHARGE SUPPORT TEAM (NETWORK) [phone]   PARENT CHILD HEALTH (Maternity/NICU/Pediatrics) [phone]   [facility] [phone]   [facility] [phone]   [facility] [phone]   [facility] [phone]   [facility] [phone]   [facility] [phone]   General acute hospital [phone]   [facility] [phone]   [facility] [phone]   [facility] [phone]   [facility] [phone]   [facility] [phone]

## 2024-09-01 NOTE — TELEPHONE ENCOUNTER
Patient with a history of prostate cancer status post prostatectomy and artificial sphincter.  Would like to establish with a urologist used to follow with Dr. Corral with our practice.  Would like to reestablish with a urologist at [place].  Can schedule nonurgent follow-up    Attempted to activate urethral sphincter.  Unknown what type patient has has been there is no documentation patient is not aware or have any type of cards to assist.    Please assist close outpatient follow-up set patient can have teaching for activation and deactivation of urethral sphincter.  Currently incontinent for now as was deactivated intraoperatively

## 2024-09-01 NOTE — PLAN OF CARE
Problem: PHYSICAL THERAPY ADULT  Goal: Performs mobility at highest level of function for planned discharge setting.  See evaluation for individualized goals.  Description: Treatment/Interventions: ADL retraining, Functional transfer training, LE strengthening/ROM, Therapeutic exercise, Endurance training, Patient/family training, Equipment eval/education, Bed mobility, Gait training, Spoke to nursing, Spoke to case management, OT, Elevations          See flowsheet documentation for full assessment, interventions and recommendations.  Outcome: Progressing  Note: Prognosis: Good  Problem List: Decreased strength, Decreased endurance, Impaired balance, Decreased mobility, Pain  Assessment: Pt is a 79 y.o. male seen for a high complexity PT evaluation due to Ongoing medical management for primary dx, Increased reliance on more restrictive AD compared to baseline, Decreased activity tolerance compared to baseline, Fall risk, Increased assistance needed from caregiver at current time, Increased O2 via NC from pts baseline, s/p surgical intervention. Patient is s/p admit to [facility] on 8/30/2024 for Peripheral arteriosclerosis (HCC) (I70.209). 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     PT now consulted to assess functional mobility and needs for safe d/c planning. Prior to admission, pt independent with functional mobility, independent ADLs, and independent IADLs. Personal factors affecting status include fall risk, steps to enter home, steps to negotiate within home, and medical status     Currently  pt requires minimal assistance x1 for bed mobility, minimal assistance x1 for functional transfers with rolling walker ; minimal assistance x1 for ambulation with rolling walker. Pt presents functioning below baseline and w/ overall mobility deficits 2* to: decreased strength, decreased endurance, decreased mobility, impaired balance. These impairments place pt at risk for falls.     Pt will continue to benefit from skilled PT interventions to address stated impairments; to maximize functional potential; for ongoing pt/family education; and DME needs. The patient's AM-PAC Basic Mobility Inpatient Short Form Raw Score Is 17. PT is currently anticipating Level 3 - Minimum Resource Intensity (pending safe stair trial) on d/c from hospital. Will continue to follow as able.  Barriers to Discharge: Inaccessible home environment     Rehab Resource Intensity Level, PT: III (Minimum Resource Intensity) (anticipating level 3 pending safe stair trial)    See flowsheet documentation for full assessment.

## 2024-09-01 NOTE — QUICK NOTE
Patient with a history of robotic prostatectomy in 2015 followed by artificial center placement 2017.  Underwent vascular surgery procedure and patient has a history of urinary retention.  Urology attending deactivated patient's sphincter to allow for adequate drainage.  Patient's plans to be discharged today.  Patient's urethral sphincter activated.      Estella Loyd PA-C

## 2024-09-01 NOTE — PLAN OF CARE
Problem: OCCUPATIONAL THERAPY ADULT  Goal: Performs self-care activities at highest level of function for planned discharge setting.  See evaluation for individualized goals.  Description: Treatment Interventions: ADL retraining, Functional transfer training, UE strengthening/ROM, Endurance training, Patient/family training, Equipment evaluation/education, Compensatory technique education, Energy conservation, Activityengagement          See flowsheet documentation for full assessment, interventions and recommendations.   Note: Limitation: Decreased ADL status, Decreased endurance, Decreased self-care trans, Decreased high-level ADLs  Prognosis: Good  Assessment: Pt is a 79 y.o. male who was admitted to [facility] on 8/30/2024 with Peripheral artery disease (HCC) s/p bypass (Left distal superficial femoral artery bypass to below knee popliteal artery bypass) 8/30; pt with respiratory distress s/p procedure. Patient  has a past medical history of Anemia, CAD (coronary artery disease), Callus, Cancer ([facility]), CHF (congestive heart failure) ([facility]), Chronic kidney disease, Clotting disorder ([facility]), Coronary artery disease (1988), Deep vein thrombosis ([facility]), Diabetes mellitus ([facility]), Difficulty walking, Duodenal ulcer, Heart disease (1988), Hyperlipidemia, Hypertension, Myocardial infarction ([facility]), Neuropathy, Neuropathy in diabetes ([facility]), Plantar fasciitis, and Sleep apnea. At baseline pt was completing all ADLs/IADLs IND, ambulating IND w/o AD. Pt lives with his dtr in a 2 . Currently pt requires MIN A for overall ADLS and for functional mobility/transfers. Pt currently presents with impairments in the following categories -steps to enter environment, difficulty performing ADLS, and difficulty performing IADLS  activity tolerance, endurance, standing balance/tolerance, and sitting balance/tolerance. These impairments, as well as pt's fatigue, pain, risk for falls, and home environment  limit pt's ability to  safely engage in all baseline areas of occupation, includinggrooming, bathing, dressing, toileting, functional mobility/transfers, community mobility, house maintenance, meal prep, cleaning, and leisure activities  From OT standpoint, anticipate LEVEL III needs upon D/C pending progress. OT will continue to follow to address the below stated goals.     Rehab Resource Intensity Level, OT: III (Minimum Resource Intensity)

## 2024-09-01 NOTE — QUICK NOTE
"Vascular Surgery QuickNote  Thomas Horne 79 y.o. male MRN: [id number]  Unit/Bed#: [facility] 520-01 Encounter: [id number]    ASSESSMENT:  Thomas Horne is a 79 y.o. male who is status post left fem to BK pop bypass with PTFE POD 1, with respiratory distress.    Plan:  CXR, neb treatment and bumex 2g x1 dose were given with consultation from renal.  Patient's symptoms greatly improved.  Vital signs have stabilized    Subjective: Patient reports feeling much better than earlier during episode of resp distress    Physical Exam:  GEN: NAD  CV: RRR  Lung: Normal effort  Ab: Soft, NT/ND  Neuro: A+Ox3  Incisions: c/d/i    Blood pressure 130/60, pulse 72, temperature 99.5 °F (37.5 °C), temperature source Oral, resp. rate (!) 23, height 6' 2\" (1.88 m), weight 105 kg (231 lb), SpO2 94%.,Body mass index is 29.66 kg/m².      Intake/Output Summary (Last 24 hours) at 8/31/2024 2000  Last data filed at 8/31/2024 1915  Gross per 24 hour   Intake 1215 ml   Output 1355 ml   Net -140 ml       Invasive Devices       Peripheral Intravenous Line  Duration             Peripheral IV 08/30/24 Left;Dorsal (posterior) Hand 1 day    Peripheral IV 08/30/24 Right;Dorsal (posterior) Hand 1 day              Drain  Duration             External Urinary Catheter Other (Comment) <1 day                    VTE Pharmacologic Prophylaxis: Sequential compression device (Venodyne)             "

## 2024-09-01 NOTE — PROGRESS NOTES
"Vascular Surgery  Progress Note   Thomas Horne 79 y.o. male MRN: [id number]  Unit/Bed#: [facility] 520-01 Encounter: [id number]    Assessment:  79 year old male with LLE CLTI s/p  distal superficial femoral artery to below knee popliteal artery bypass with 6 mm ringed PTFE.    Increasing oxygen requirements yesterday, s/p nebulizer treatments and 2 mh IV bumex d/t concern for pulmonary edema on CXR. Improved oxygen saturations overnight.     Vital signs stable this morning, afebrile. On 4L NC, titrated to 2L NC and maintaining oxygen saturations > 96%.    UOP: 1630 cc    Cr 1.05 (1.27) - improved from baseline 1.6-1.8    Plan:  Diet as tolerated, CCD2  Appreciate nephrology recommendations  Continue ASA/plavix  Monitor respiratory status, can consider further neb treatments vs diuresis if worsening o2 requirements  Wean supplemental O2 as able  Appreciate urology assistance - urethral sphincter reactivated this morning  DVT ppx: Heparin  Pain/ nausea control PRN  OOB/ ambulation  Awaiting PT/OT evaluation  Incentive Spirometry      Subjective/Objective     Subjective:   Patient seen and examined at bedside, in no acute distress. No acute events overnight. Patient's pain is well controlled. She/ He denies nausea or vomiting. Passing flatus and having bowel movements.    Objective:   Vitals:Blood pressure 158/63, pulse 69, temperature 98.4 °F (36.9 °C), temperature source Oral, resp. rate 18, height 6' 2\" (1.88 m), weight 105 kg (231 lb), SpO2 95%.  Temp (24hrs), Av.6 °F (37 °C), Min:97.4 °F (36.3 °C), Max:99.6 °F (37.6 °C)        Intake/Output Summary (Last 24 hours) at 2024 0825  Last data filed at 2024 0822  Gross per 24 hour   Intake 1275 ml   Output 1630 ml   Net -355 ml       Invasive Devices       Peripheral Intravenous Line  Duration             Peripheral IV 24 Left;Dorsal (posterior) Hand 2 days    Peripheral IV 24 Right;Dorsal (posterior) Hand 2 days              Drain  Duration       " "      External Urinary Catheter Other (Comment) <1 day                    Physical Exam:  General: No acute distress, alert and oriented  CV: Well perfused, regular rate and rhythm  Lungs: Normal work of breathing, no increased respiratory effort on 2L NC  Abdomen: Soft, non-tender, non-distended.  Extremities: LLE incisions cdi, overlying mepilex removed this morning. Motor/sensory intact. Left PT signal  Skin: Warm, dry. L foot wrapped    Lab Results: BMP/CMP:   Lab Results   Component Value Date    SODIUM 141 09/01/2024    K 3.9 09/01/2024     09/01/2024    CO2 29 09/01/2024    BUN 24 09/01/2024    CREATININE 1.05 09/01/2024    CALCIUM 8.9 09/01/2024    EGFR 66 09/01/2024    and CBC: No results found for: \"WBC\", \"HGB\", \"HCT\", \"MCV\", \"PLT\", \"ADJUSTEDWBC\", \"RBC\", \"MCH\", \"MCHC\", \"RDW\", \"MPV\", \"NRBC\"  VTE Prophylaxis: Sequential compression device (Venodyne)  and Heparin    Razia Alejandre MD  9/1/2024    "

## 2024-09-01 NOTE — OCCUPATIONAL THERAPY NOTE
Occupational Therapy Evaluation     Patient Name: Thomas Horne  Today's Date: 9/1/2024  Problem List  Principal Problem:    Peripheral artery disease (HCC)  Active Problems:    Mixed hyperlipidemia    Primary hypertension    Type 2 diabetes mellitus with diabetic polyneuropathy, with long-term current use of insulin (HCC)    Diabetic ulcer of right midfoot associated with diabetes mellitus due to underlying condition, limited to breakdown of skin (HCC)    History of DVT (deep vein thrombosis)    Past Medical History  Past Medical History:   Diagnosis Date    Anemia     CAD (coronary artery disease)     Callus     Cancer (HCC)     prostate    CHF (congestive heart failure) (HCC)     Chronic kidney disease     Clotting disorder (HCC)     Coronary artery disease 1988    Deep vein thrombosis (HCC)     Diabetes mellitus (HCC)     Difficulty walking     Duodenal ulcer     Heart disease 1988    Hyperlipidemia     Hypertension     Myocardial infarction (HCC)     Neuropathy     Bilateral feet    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)     Plantar fasciitis     Sleep apnea     Could not tolerate CPAP     Past Surgical History  Past Surgical History:   Procedure Laterality Date    ABDOMINAL AORTIC ANEURYSM REPAIR      Stented    ADENOIDECTOMY      BACK SURGERY  1985    CARDIAC CATHETERIZATION Left 10/19/2022    Procedure: Cardiac Left Heart Cath;  Surgeon: Danielle Pereira MD;  Location: [facility];  Service: Cardiology    CARDIAC SURGERY  2002    3 cardiac bypass then angioplasty 7/2020    CHOLECYSTECTOMY      COLONOSCOPY  02/14/2024    CORONARY ARTERY BYPASS GRAFT      IR LOWER EXTREMITY ANGIOGRAM  11/01/2023    IR LOWER EXTREMITY ANGIOGRAM  08/07/2024    LAMINECTOMY  1990    NE BYPASS W/VEIN FEMORAL-POPLITEAL Right 11/16/2023    Procedure: BYPASS FEMORAL-POPLITEAL WITH CRYO VEIN, RIGHT FEMORAL ENDARTERECTOMY;  Surgeon: Vasquez Clark MD;  Location: [facility];  Service: Vascular    NE SLCTV CATHJ 3RD+ ORD  SLCTV ABDL PEL/LXTR BRNCH Right 11/01/2023    Procedure: ARTERIOGRAM Right lower extremity arteriogram with CO2 via right groin access;  Surgeon: Vasquez Clark MD;  Location: [facility];  Service: Vascular    WV SLCTV CATHJ 3RD+ ORD SLCTV ABDL PEL/LXTR BRNCH Left 08/07/2024    Procedure: diagnostic LLE Arteriogram;  Surgeon: Vasquez Clark MD;  Location: [facility];  Service: Vascular    PROSTATE SURGERY      TONSILLECTOMY      URINARY SPHINCTER IMPLANT           09/01/24 0857   OT Last Visit   OT Visit Date 09/01/24   Note Type   Note type Evaluation   Pain Assessment   Pain Assessment Tool 0-10   Pain Score 2   Pain Location/Orientation Orientation: Left;Location: Leg   Hospital Pain Intervention(s) Repositioned;Ambulation/increased activity   Restrictions/Precautions   Weight Bearing Precautions Per Order No   Other Precautions Chair Alarm;Bed Alarm;Fall Risk;Multiple lines;Telemetry;O2   Home Living   Type of Home House   Home Layout Two level;1/2 bath on main level;Bed/bath upstairs  (4 GABE)   Bathroom Shower/Tub Walk-in shower   Bathroom Toilet Raised   Bathroom Equipment Shower chair  (did not use PTA)   Home Equipment Walker;Cane  (from prior surgery - did not use PTA)   Prior Function   Level of [place] Independent with ADLs;Independent with functional mobility   Lives With Daughter   Receives Help From Family   IADLs Independent with driving;Independent with meal prep;Independent with medication management   Falls in the last 6 months 0   Vocational Retired   Lifestyle   Autonomy At baseline pt was completing all ADLs/IADLs IND, ambulating IND w/o AD.   Reciprocal Relationships supportive dtr - works during the day   Service to Others retired   Intrinsic Gratification family   ADL   Eating Assistance 7  Independent   Grooming Assistance 5  Supervision/Setup   UB Bathing Assistance 5  Supervision/Setup   LB Bathing Assistance 4  Minimal Assistance   UB Dressing Assistance 5   Supervision/Setup   LB Dressing Assistance 3  Moderate Assistance   LB Dressing Deficit Thread RLE into underwear;Thread LLE into underwear   Toileting Assistance  3  Moderate Assistance   Toileting Deficit Perineal hygiene;Grab bar use   Transfers   Sit to Stand 4  Minimal assistance   Additional items Assist x 1;Increased time required   Stand to Sit 4  Minimal assistance   Additional items Assist x 1;Increased time required   Toilet transfer 4  Minimal assistance   Additional items Assist x 1;Increased time required;Commode  (commode over toilet)   Additional Comments RW   Functional Mobility   Functional Mobility 4  Minimal assistance   Additional Comments bed to bathroom to recliner to bathroom to recliner   Additional items Rolling walker   Balance   Static Sitting Fair +   Dynamic Sitting Fair +   Static Standing Fair -   Dynamic Standing Poor +   Activity Tolerance   Activity Tolerance Patient limited by pain   Medical Staff Made Aware PT   Nurse Made Aware Yes   RUE Assessment   RUE Assessment WFL   LUE Assessment   LUE Assessment WFL   Hand Function   Gross Motor Coordination Functional   Fine Motor Coordination Functional   Psychosocial   Psychosocial (WDL) WDL   Cognition   Overall Cognitive Status WFL   Arousal/Participation Alert;Cooperative   Attention Within functional limits   Orientation Level Oriented X4   Memory Within functional limits   Following Commands Follows one step commands without difficulty   Assessment   Limitation Decreased ADL status;Decreased endurance;Decreased self-care trans;Decreased high-level ADLs   Prognosis Good   Assessment Pt is a 79 y.o. male who was admitted to [facility] on 8/30/2024 with Peripheral artery disease (HCC) s/p bypass (Left distal superficial femoral artery bypass to below knee popliteal artery bypass) 8/30; pt with respiratory distress s/p procedure. 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 At baseline pt was completing all ADLs/IADLs IND, ambulating IND w/o AD. Pt lives with his dtr in a 2 . Currently pt requires MIN A for overall ADLS and for functional mobility/transfers. Pt currently presents with impairments in the following categories -steps to enter environment, difficulty performing ADLS, and difficulty performing IADLS  activity tolerance, endurance, standing balance/tolerance, and sitting balance/tolerance. These impairments, as well as pt's fatigue, pain, risk for falls, and home environment  limit pt's ability to safely engage in all baseline areas of occupation, includinggrooming, bathing, dressing, toileting, functional mobility/transfers, community mobility, house maintenance, meal prep, cleaning, and leisure activities  From OT standpoint, anticipate LEVEL III needs upon D/C pending progress. OT will continue to follow to address the below stated goals.   Goals   Patient Goals to go home   LTG Time Frame 10-14   Long Term Goal #1 see goals below   Plan   Treatment Interventions ADL retraining;Functional transfer training;UE strengthening/ROM;Endurance training;Patient/family training;Equipment evaluation/education;Compensatory technique education;Energy conservation;Activityengagement   Goal Expiration Date 09/15/24   OT Frequency 2-3x/wk   Discharge Recommendation   Rehab Resource Intensity Level, OT III (Minimum Resource Intensity)   Additional Comments  anticipate level III pending progress/decreased pain   AM-PAC Daily Activity Inpatient   Lower Body Dressing 2   Bathing 3   Toileting 2   Upper Body Dressing 3    Grooming 3   Eating 4   Daily Activity Raw Score 17   Daily Activity Standardized Score (Calc for Raw Score >=11) 37.26   AM-PAC Applied Cognition Inpatient   Following a Speech/Presentation 4   Understanding Ordinary Conversation 4   Taking Medications 4   Remembering Where Things Are Placed or Put Away 4   Remembering List of 4-5 Errands 4   Taking Care of Complicated Tasks 4   Applied Cognition Raw Score 24   Applied Cognition Standardized Score 62.21   End of Consult   Patient Position at End of Consult Bedside chair;Bed/Chair alarm activated;All needs within reach   Nurse Communication Nurse aware of consult         The patient's raw score on the AM-PAC Daily Activity Inpatient Short Form is 17. A raw score of less than 19 suggests the patient may benefit from discharge to post-acute rehabilitation services. Please refer to the recommendation of the Occupational Therapist for safe discharge planning.      GOALS     Pt will improve activity tolerance to G for min 30 min txment sessions     Pt will complete UB ADLs with MOD IND and use of adaptive device and DME as needed     Pt will complete LB ADLs with MOD IND w/ use of AE and DME as needed     Pt will complete toileting w/ MOD IND w/ G hygiene/thoroughness using DME as needed     Pt will improve functional transfers to Mod I on/off all surfaces using DME as needed w/ G balance/safety      Pt will improve functional mobility during ADL/IADL/leisure tasks to Mod I using DME as needed w/ G balance/safety      Pt will demonstrate G carryover of pt/caregiver education and training as appropriate w/ mod I w/o cues w/ good tolerance         Lottie Branham, OTR/L

## 2024-09-02 ENCOUNTER — APPOINTMENT (INPATIENT)
Dept: RADIOLOGY | Facility: HOSPITAL | Age: 79
DRG: 253 | End: 2024-09-02
Payer: COMMERCIAL

## 2024-09-02 LAB
ANION GAP SERPL CALCULATED.3IONS-SCNC: 8 MMOL/L (ref 4–13)
BUN SERPL-MCNC: 22 MG/DL (ref 5–25)
CALCIUM SERPL-MCNC: 8.7 MG/DL (ref 8.4–10.2)
CHLORIDE SERPL-SCNC: 103 MMOL/L (ref 96–108)
CO2 SERPL-SCNC: 29 MMOL/L (ref 21–32)
CREAT SERPL-MCNC: 1.03 MG/DL (ref 0.6–1.3)
ERYTHROCYTE [DISTWIDTH] IN BLOOD BY AUTOMATED COUNT: 15.7 % (ref 11.6–15.1)
GFR SERPL CREATININE-BSD FRML MDRD: 67 ML/MIN/1.73SQ M
GLUCOSE SERPL-MCNC: 109 MG/DL (ref 65–140)
GLUCOSE SERPL-MCNC: 110 MG/DL (ref 65–140)
GLUCOSE SERPL-MCNC: 128 MG/DL (ref 65–140)
GLUCOSE SERPL-MCNC: 167 MG/DL (ref 65–140)
GLUCOSE SERPL-MCNC: 186 MG/DL (ref 65–140)
GLUCOSE SERPL-MCNC: 198 MG/DL (ref 65–140)
HCT VFR BLD AUTO: 31.7 % (ref 36.5–49.3)
HGB BLD-MCNC: 9.9 G/DL (ref 12–17)
MAGNESIUM SERPL-MCNC: 1.7 MG/DL (ref 1.9–2.7)
MCH RBC QN AUTO: 25.9 PG (ref 26.8–34.3)
MCHC RBC AUTO-ENTMCNC: 31.2 G/DL (ref 31.4–37.4)
MCV RBC AUTO: 83 FL (ref 82–98)
PLATELET # BLD AUTO: 236 THOUSANDS/UL (ref 149–390)
PMV BLD AUTO: 11.6 FL (ref 8.9–12.7)
POTASSIUM SERPL-SCNC: 3.3 MMOL/L (ref 3.5–5.3)
RBC # BLD AUTO: 3.82 MILLION/UL (ref 3.88–5.62)
SODIUM SERPL-SCNC: 140 MMOL/L (ref 135–147)
WBC # BLD AUTO: 9.77 THOUSAND/UL (ref 4.31–10.16)

## 2024-09-02 PROCEDURE — 99024 POSTOP FOLLOW-UP VISIT: CPT | Performed by: SURGERY

## 2024-09-02 PROCEDURE — 94668 MNPJ CHEST WALL SBSQ: CPT

## 2024-09-02 PROCEDURE — NC001 PR NO CHARGE: Performed by: PODIATRIST

## 2024-09-02 PROCEDURE — 83735 ASSAY OF MAGNESIUM: CPT

## 2024-09-02 PROCEDURE — 73630 X-RAY EXAM OF FOOT: CPT

## 2024-09-02 PROCEDURE — 80048 BASIC METABOLIC PNL TOTAL CA: CPT | Performed by: PHYSICIAN ASSISTANT

## 2024-09-02 PROCEDURE — 94664 DEMO&/EVAL PT USE INHALER: CPT

## 2024-09-02 PROCEDURE — 85027 COMPLETE CBC AUTOMATED: CPT | Performed by: PHYSICIAN ASSISTANT

## 2024-09-02 PROCEDURE — 82948 REAGENT STRIP/BLOOD GLUCOSE: CPT

## 2024-09-02 RX ORDER — MAGNESIUM SULFATE HEPTAHYDRATE 40 MG/ML
2 INJECTION, SOLUTION INTRAVENOUS ONCE
Status: DISCONTINUED | OUTPATIENT
Start: 2024-09-02 | End: 2024-09-02

## 2024-09-02 RX ORDER — MAGNESIUM SULFATE 1 G/100ML
1 INJECTION INTRAVENOUS ONCE
Status: COMPLETED | OUTPATIENT
Start: 2024-09-02 | End: 2024-09-03

## 2024-09-02 RX ORDER — POTASSIUM CHLORIDE 1500 MG/1
20 TABLET, EXTENDED RELEASE ORAL ONCE
Status: COMPLETED | OUTPATIENT
Start: 2024-09-02 | End: 2024-09-02

## 2024-09-02 RX ORDER — POTASSIUM CHLORIDE 1500 MG/1
40 TABLET, EXTENDED RELEASE ORAL ONCE
Status: COMPLETED | OUTPATIENT
Start: 2024-09-02 | End: 2024-09-02

## 2024-09-02 RX ADMIN — PREGABALIN 100 MG: 50 CAPSULE ORAL at 07:38

## 2024-09-02 RX ADMIN — CLOPIDOGREL BISULFATE 75 MG: 75 TABLET ORAL at 07:38

## 2024-09-02 RX ADMIN — PENTOXIFYLLINE 400 MG: 400 TABLET, EXTENDED RELEASE ORAL at 07:15

## 2024-09-02 RX ADMIN — INSULIN LISPRO 2 UNITS: 100 INJECTION, SOLUTION INTRAVENOUS; SUBCUTANEOUS at 21:03

## 2024-09-02 RX ADMIN — SENNOSIDES 8.6 MG: 8.6 TABLET, FILM COATED ORAL at 07:38

## 2024-09-02 RX ADMIN — RANOLAZINE 1000 MG: 500 TABLET, FILM COATED, EXTENDED RELEASE ORAL at 07:37

## 2024-09-02 RX ADMIN — ATORVASTATIN CALCIUM 40 MG: 40 TABLET, FILM COATED ORAL at 17:20

## 2024-09-02 RX ADMIN — AMLODIPINE BESYLATE 5 MG: 5 TABLET ORAL at 07:38

## 2024-09-02 RX ADMIN — HEPARIN SODIUM 5000 UNITS: 5000 INJECTION INTRAVENOUS; SUBCUTANEOUS at 05:00

## 2024-09-02 RX ADMIN — MAGNESIUM SULFATE HEPTAHYDRATE 1 G: 1 INJECTION, SOLUTION INTRAVENOUS at 08:51

## 2024-09-02 RX ADMIN — PENTOXIFYLLINE 400 MG: 400 TABLET, EXTENDED RELEASE ORAL at 12:11

## 2024-09-02 RX ADMIN — INSULIN LISPRO 1 UNITS: 100 INJECTION, SOLUTION INTRAVENOUS; SUBCUTANEOUS at 17:20

## 2024-09-02 RX ADMIN — MAGNESIUM SULFATE HEPTAHYDRATE 1 G: 1 INJECTION, SOLUTION INTRAVENOUS at 11:07

## 2024-09-02 RX ADMIN — ASPIRIN 81 MG: 81 TABLET, COATED ORAL at 07:37

## 2024-09-02 RX ADMIN — CLONIDINE HYDROCHLORIDE 0.1 MG: 0.1 TABLET ORAL at 00:00

## 2024-09-02 RX ADMIN — PREGABALIN 100 MG: 50 CAPSULE ORAL at 21:03

## 2024-09-02 RX ADMIN — METOPROLOL TARTRATE 25 MG: 25 TABLET, FILM COATED ORAL at 07:38

## 2024-09-02 RX ADMIN — ACETAMINOPHEN 975 MG: 325 TABLET ORAL at 21:03

## 2024-09-02 RX ADMIN — PENTOXIFYLLINE 400 MG: 400 TABLET, EXTENDED RELEASE ORAL at 17:20

## 2024-09-02 RX ADMIN — RANOLAZINE 1000 MG: 500 TABLET, FILM COATED, EXTENDED RELEASE ORAL at 17:19

## 2024-09-02 RX ADMIN — METOPROLOL TARTRATE 25 MG: 25 TABLET, FILM COATED ORAL at 21:03

## 2024-09-02 RX ADMIN — TORSEMIDE 20 MG: 20 TABLET ORAL at 07:38

## 2024-09-02 RX ADMIN — PREGABALIN 100 MG: 50 CAPSULE ORAL at 17:20

## 2024-09-02 RX ADMIN — FENOFIBRATE 145 MG: 145 TABLET ORAL at 07:37

## 2024-09-02 RX ADMIN — CLONIDINE HYDROCHLORIDE 0.1 MG: 0.1 TABLET ORAL at 23:53

## 2024-09-02 RX ADMIN — ACETAMINOPHEN 975 MG: 325 TABLET ORAL at 13:01

## 2024-09-02 RX ADMIN — DULOXETINE HYDROCHLORIDE 30 MG: 30 CAPSULE, DELAYED RELEASE ORAL at 07:37

## 2024-09-02 RX ADMIN — PANTOPRAZOLE SODIUM 40 MG: 40 TABLET, DELAYED RELEASE ORAL at 05:00

## 2024-09-02 RX ADMIN — HEPARIN SODIUM 5000 UNITS: 5000 INJECTION INTRAVENOUS; SUBCUTANEOUS at 21:03

## 2024-09-02 RX ADMIN — POTASSIUM CHLORIDE 40 MEQ: 1500 TABLET, EXTENDED RELEASE ORAL at 07:38

## 2024-09-02 RX ADMIN — ONDANSETRON 4 MG: 2 INJECTION INTRAMUSCULAR; INTRAVENOUS at 09:03

## 2024-09-02 RX ADMIN — INSULIN GLARGINE 15 UNITS: 100 INJECTION, SOLUTION SUBCUTANEOUS at 21:03

## 2024-09-02 RX ADMIN — ISOSORBIDE MONONITRATE 90 MG: 60 TABLET, EXTENDED RELEASE ORAL at 07:37

## 2024-09-02 RX ADMIN — INSULIN LISPRO 1 UNITS: 100 INJECTION, SOLUTION INTRAVENOUS; SUBCUTANEOUS at 11:14

## 2024-09-02 RX ADMIN — HEPARIN SODIUM 5000 UNITS: 5000 INJECTION INTRAVENOUS; SUBCUTANEOUS at 13:01

## 2024-09-02 RX ADMIN — POTASSIUM CHLORIDE 20 MEQ: 1500 TABLET, EXTENDED RELEASE ORAL at 07:14

## 2024-09-02 NOTE — QUICK NOTE
Renal function remained stable on torsemide 20 mg daily restarted yesterday.  Patient does have hypomagnesemia and hypokalemia, likely diuretic induced, monitor status post repletion.  If persistent, consider standing repletion.  Nephrology will sign off.  Please call or text with questions.  Should continue outpatient follow-up with Dr. Fischer.  Does have follow-up appointment on December 6, 2024 in the [place] office at 8 AM.

## 2024-09-02 NOTE — WOUND OSTOMY CARE
Wound care consulted with patient with multiple diabetic wounds. Patient would benefit from podiatry consult. Orders placed per wound care outpatient orders and will sign off.     Cleanse left toe, plantar foot, and heel wound with normal saline, apply aquacel ag to wound bed and cover with silicone bordered foam or dry dressing. Change every 3 days and PRN     Jacqueline SMITHN, RN, CWOCN

## 2024-09-02 NOTE — PLAN OF CARE
Problem: SAFETY ADULT  Goal: Patient will remain free of falls  Description: INTERVENTIONS:  - Educate patient/family on patient safety including physical limitations  - Instruct patient to call for assistance with activity   - Consult OT/PT to assist with strengthening/mobility   - Keep Call bell within reach  - Keep bed low and locked with side rails adjusted as appropriate  - Keep care items and personal belongings within reach  - Initiate and maintain comfort rounds  - Make Fall Risk Sign visible to staff  - Offer Toileting every  Hours, in advance of need  - Initiate/Maintain alarm  - Obtain necessary fall risk management equipment:   - Apply yellow socks and bracelet for high fall risk patients  - Consider moving patient to room near nurses station  Outcome: Progressing  Goal: Maintain or return to baseline ADL function  Description: INTERVENTIONS:  -  Assess patient's ability to carry out ADLs; assess patient's baseline for ADL function and identify physical deficits which impact ability to perform ADLs (bathing, care of mouth/teeth, toileting, grooming, dressing, etc.)  - Assess/evaluate cause of self-care deficits   - Assess range of motion  - Assess patient's mobility; develop plan if impaired  - Assess patient's need for assistive devices and provide as appropriate  - Encourage maximum independence but intervene and supervise when necessary  - Involve family in performance of ADLs  - Assess for home care needs following discharge   - Consider OT consult to assist with ADL evaluation and planning for discharge  - Provide patient education as appropriate  Outcome: Progressing  Goal: Maintains/Returns to pre admission functional level  Description: INTERVENTIONS:  - Perform AM-PAC 6 Click Basic Mobility/ Daily Activity assessment daily.  - Set and communicate daily mobility goal to care team and patient/family/caregiver.   - Collaborate with rehabilitation services on mobility goals if consulted  - Perform  Range of Motion  times a day.  - Reposition patient every  hours.  - Dangle patient  times a day  - Stand patient  times a day  - Ambulate patient  times a day  - Out of bed to chair  times a day   - Out of bed for meals times a day  - Out of bed for toileting  - Record patient progress and toleration of activity level   Outcome: Progressing     Problem: Prexisting or High Potential for Compromised Skin Integrity  Goal: Skin integrity is maintained or improved  Description: INTERVENTIONS:  - Identify patients at risk for skin breakdown  - Assess and monitor skin integrity  - Assess and monitor nutrition and hydration status  - Monitor labs   - Assess for incontinence   - Turn and reposition patient  - Assist with mobility/ambulation  - Relieve pressure over bony prominences  - Avoid friction and shearing  - Provide appropriate hygiene as needed including keeping skin clean and dry  - Evaluate need for skin moisturizer/barrier cream  - Collaborate with interdisciplinary team   - Patient/family teaching  - Consider wound care consult   Outcome: Progressing     Problem: CARDIOVASCULAR - ADULT  Goal: Maintains optimal cardiac output and hemodynamic stability  Description: INTERVENTIONS:  - Monitor I/O, vital signs and rhythm  - Monitor for S/S and trends of decreased cardiac output  - Administer and titrate ordered vasoactive medications to optimize hemodynamic stability  - Assess quality of pulses, skin color and temperature  - Assess for signs of decreased coronary artery perfusion  - Instruct patient to report change in severity of symptoms  Outcome: Progressing  Goal: Absence of cardiac dysrhythmias or at baseline rhythm  Description: INTERVENTIONS:  - Continuous cardiac monitoring, vital signs, obtain 12 lead EKG if ordered  - Administer antiarrhythmic and heart rate control medications as ordered  - Monitor electrolytes and administer replacement therapy as ordered  Outcome: Progressing     Problem: RESPIRATORY -  ADULT  Goal: Achieves optimal ventilation and oxygenation  Description: INTERVENTIONS:  - Assess for changes in respiratory status  - Assess for changes in mentation and behavior  - Position to facilitate oxygenation and minimize respiratory effort  - Oxygen administered by appropriate delivery if ordered  - Initiate smoking cessation education as indicated  - Encourage broncho-pulmonary hygiene including cough, deep breathe, Incentive Spirometry  - Assess the need for suctioning and aspirate as needed  - Assess and instruct to report SOB or any respiratory difficulty  - Respiratory Therapy support as indicated  Outcome: Progressing

## 2024-09-02 NOTE — PROGRESS NOTES
"Vascular Surgery  Progress Note   Thomas Horne 79 y.o. male MRN: [id number]  Unit/Bed#: [facility] 520-01 Encounter: [id number]    Assessment:  79 year old male with LLE CLTI s/p  distal superficial femoral artery to below knee popliteal artery bypass with 6 mm ringed PTFE.    Vital signs stable this morning, afebrile. Weaned off oxygen and saturating appropriately on room air.    UOP: 1800 cc    WBC 9.77 (10.86)  Hgb 9.9 (9.3)  Cr 1.03 (1.05)      Plan:  Diet as tolerated - CCD2  Appreciate nephrology recommendations  Continue ASA/plavix  Monitor respiratory status, off O2 this morning  Appreciate urology assistance - urethral sphincter reactivated this morning  DVT ppx: Heparin  Pain/ nausea control PRN  OOB/ ambulation  PT/OT: level 3, to continue working with him prior to discharge  Incentive Spirometry  Dispo planning      Subjective/Objective     Subjective:   Patient seen and examined at bedside, in no acute distress. No acute events overnight. Pain is controlled. Was OOB/ambulating to the bathroom yesterday. Tolerating diet without nausea or vomiting.    Objective:   Vitals:Blood pressure (!) 175/76, pulse 76, temperature 98.3 °F (36.8 °C), temperature source Oral, resp. rate 18, height 6' 2\" (1.88 m), weight 105 kg (231 lb), SpO2 92%.  Temp (24hrs), Av.5 °F (36.9 °C), Min:98 °F (36.7 °C), Max:99.1 °F (37.3 °C)        Intake/Output Summary (Last 24 hours) at 2024 0846  Last data filed at 2024 0823  Gross per 24 hour   Intake 1050 ml   Output 1800 ml   Net -750 ml       Invasive Devices       Peripheral Intravenous Line  Duration             Peripheral IV 24 Left;Dorsal (posterior) Hand 3 days    Peripheral IV 24 Right;Dorsal (posterior) Hand 3 days                    Physical Exam:  General: No acute distress, alert and oriented  CV: Well perfused, regular rate and rhythm  Lungs: Normal work of breathing, no increased respiratory effort on RA  Abdomen: Soft, non-tender, " non-distended.  Extremities: LLE incisions cdi, motor/sensory intact. Mild swelling.  Skin: Warm, dry. B/l feet wrapped with kerlex    Lab Results: BMP/CMP:   Lab Results   Component Value Date    SODIUM 140 09/02/2024    K 3.3 (L) 09/02/2024     09/02/2024    CO2 29 09/02/2024    BUN 22 09/02/2024    CREATININE 1.03 09/02/2024    CALCIUM 8.7 09/02/2024    EGFR 67 09/02/2024    and CBC:   Lab Results   Component Value Date    WBC 9.77 09/02/2024    HGB 9.9 (L) 09/02/2024    HCT 31.7 (L) 09/02/2024    MCV 83 09/02/2024     09/02/2024    RBC 3.82 (L) 09/02/2024    MCH 25.9 (L) 09/02/2024    MCHC 31.2 (L) 09/02/2024    RDW 15.7 (H) 09/02/2024    MPV 11.6 09/02/2024     VTE Prophylaxis: Sequential compression device (Venodyne)  and Heparin    Razia Alejandre MD  9/2/2024

## 2024-09-02 NOTE — CONSULTS
Podiatry - Consultation    Patient Information:   Thomas Horne 79 y.o. male MRN: [id number]  Unit/Bed#: [facility] 520-01 Encounter: [id number]  PCP: Frank Lombardi, DO  Date of Admission:  8/30/2024  Date of Consultation: 09/02/24  Requesting Physician: Vasquez Medina*      ASSESSMENT:    Thomas Horne is a 79 y.o. male with:    Peripheral artery disease  Type 2 diabetes with diabetic polyneuropathy.  (Last A1c 7.0% 4/17/2024)  Type 2 diabetes with diabetic foot ulcer  History of DVT    PLAN:    Patient was seen and evaluated at bedside with all questions and concerns addressed.  Reviewed A1c, last A1c is 7.0% 4 months ago, the time before is 7.0% 11 minutes ago.  Reviewed x-ray no bony erosion and soft tissue defect was noted.  No soft tissue emphysema or abscess was noted.   Dressing was changed. The previous dressing was clean, dry and intact without strikethrough. The current dressing consists of betadine, Adaptic, 4 x 4, and Kerlix. Next dressing change is planned on 9/5-9/6  Plantar left heel wound shows Negative  probe to bone with minimal, serous drainage. Periwound skin shows Negative redness, swelling and heat. Negative  crepitus and fluctuance was found.  Left fifth metatarsal base wound shows Negative  probe to bone with minimal, serous drainage. Periwound skin shows Negative redness, swelling and heat. Negative  crepitus and fluctuance was found.  Left distal second toe wound shows Negative  probe to bone with minimal, serous drainage. Periwound skin shows Negative redness, swelling and heat. Negative  crepitus and fluctuance was found.  Patients' lab and vital signs were reviewed. Patient is afebrile with no leukocytosis. Patient does not  meet the SIRS criteria. Will keep monitoring.  Continue local wound care, appreciate nursing assistance with dressing changes.  PT and OT consulted for his offloading of the left foot in weightbearing  Follow-up with  [facility], vascular surgery,  PT, OT, CM  recommendations  Elevation and offloading on green foam wedges or pillows when non-ambulatory.  Rest of care per primary team.  Will discuss this plan with my attending and update as needed.     Weightbearing status: Weightbearing as tolerated to the left forefoot    SUBJECTIVE:    History of Present Illness:    Thomas Horne is a 79 y.o. male who is originally admitted 8/30/2024 due to peripheral vascular disease. Patient has a past medical history of type 2 diabetes with diabetic polyneuropathy, type 2 diabetes with diabetic foot ulcer, history of DVT.  He states that the left foot ulcer has been there for few months and he has been under care by Dr. Arango.  However the wound never really heals and they cause so much pain that he cannot walk well.  He has just finished a left lower limb bypass procedure, and then he feels much less pain on the left foot wounds.    We are consulted for left foot ulcer    Review of Systems:    Constitutional: Negative.    HENT: Negative.    Eyes: Negative.    Respiratory: Negative.    Cardiovascular: Negative.    Gastrointestinal: Negative.    Musculoskeletal: Negative  Skin: Left foot wounds  Neurological: Reduced sensation of feet  Psych: Negative.     Past Medical and Surgical History:     Past Medical History:   Diagnosis Date    Anemia     CAD (coronary artery disease)     Callus     Cancer (HCC)     prostate    CHF (congestive heart failure) (HCC)     Chronic kidney disease     Clotting disorder (HCC)     Coronary artery disease 1988    Deep vein thrombosis (HCC)     Diabetes mellitus (HCC)     Difficulty walking     Duodenal ulcer     Heart disease 1988    Hyperlipidemia     Hypertension     Myocardial infarction (HCC)     Neuropathy     Bilateral feet    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)     Plantar fasciitis     Sleep apnea     Could not tolerate CPAP       Past Surgical History:   Procedure Laterality Date    ABDOMINAL AORTIC ANEURYSM REPAIR      Stented    ADENOIDECTOMY       BACK SURGERY  1985    CARDIAC CATHETERIZATION Left 10/19/2022    Procedure: Cardiac Left Heart Cath;  Surgeon: Danielle Pereira MD;  Location: [facility];  Service: Cardiology    CARDIAC SURGERY      3 cardiac bypass then angioplasty 2020    CHOLECYSTECTOMY      COLONOSCOPY  2024    CORONARY ARTERY BYPASS GRAFT      IR LOWER EXTREMITY ANGIOGRAM  2023    IR LOWER EXTREMITY ANGIOGRAM  2024    LAMINECTOMY      [place] BYPASS W/VEIN FEMORAL-POPLITEAL Right 2023    Procedure: BYPASS FEMORAL-POPLITEAL WITH CRYO VEIN, RIGHT FEMORAL ENDARTERECTOMY;  Surgeon: Vasquez Clark MD;  Location: [facility];  Service: Vascular    NY SLCTV CATHJ 3RD+ ORD SLCTV ABDL PEL/LXTR BRNCH Right 2023    Procedure: ARTERIOGRAM Right lower extremity arteriogram with CO2 via right groin access;  Surgeon: Vasquez Clark MD;  Location: [facility];  Service: Vascular    [place] SLCTV CATHJ 3RD+ ORD SLCTV ABDL PEL/LXTR BRNCH Left 2024    Procedure: diagnostic LLE Arteriogram;  Surgeon: Vasquez Clark MD;  Location: [facility];  Service: Vascular    PROSTATE SURGERY      TONSILLECTOMY      URINARY SPHINCTER IMPLANT         Meds/Allergies:      Medications Prior to Admission:     amLODIPine (NORVASC) 10 mg tablet    aspirin 81 mg chewable tablet    atorvastatin (LIPITOR) 40 mg tablet    cloNIDine (CATAPRES) 0.1 mg tablet    DULoxetine (CYMBALTA) 30 mg delayed release capsule    Empagliflozin (Jardiance) 25 MG TABS    fenofibrate 160 MG tablet    glimepiride (AMARYL) 2 mg tablet    insulin glargine (LANTUS) 100 units/mL subcutaneous injection    isosorbide mononitrate (IMDUR) 30 mg 24 hr tablet    ketoconazole (NIZORAL) 2 % cream    metFORMIN (GLUCOPHAGE) 500 mg tablet    metoprolol tartrate (LOPRESSOR) 50 mg tablet    Multiple Vitamins-Minerals (MULTIVITAMIN MEN 50+ PO)    nitroglycerin (NITROSTAT) 0.4 mg SL tablet    Omega-3 Fatty Acids (fish oil) 1,000 mg    []  "oxyCODONE-acetaminophen (PERCOCET) 5-325 mg per tablet    pantoprazole (PROTONIX) 40 mg tablet    pentoxifylline (TRENtal) 400 mg ER tablet    pregabalin (LYRICA) 100 mg capsule    ranolazine (RANEXA) 1000 MG SR tablet    sitaGLIPtin (Januvia) 100 mg tablet    sodium hypochlorite (DAKIN'S QUARTER-STRENGTH)    torsemide (DEMADEX) 20 mg tablet    warfarin (Coumadin) 2.5 mg tablet    Blood Glucose Monitoring Suppl (OneTouch Verio Reflect) w/Device KIT    Droplet Pen Needles 32G X 4 MM MISC    glucose blood (OneTouch Verio) test strip    ondansetron (ZOFRAN) 4 mg tablet    OneTouch Delica Lancets 33G MISC    Allergies   Allergen Reactions    Lisinopril Rash and Lip Swelling       Social History:     Marital Status:     Substance Use History:   Social History     Substance and Sexual Activity   Alcohol Use Yes    Alcohol/week: 5.0 standard drinks of alcohol    Types: 5 Cans of beer per week    Comment: stopped last few months     Social History     Tobacco Use   Smoking Status Former    Current packs/day: 0.00    Average packs/day: 1.5 packs/day for 33.0 years (49.5 ttl pk-yrs)    Types: Cigarettes    Start date: 1956    Quit date:     Years since quittin.6    Passive exposure: Past   Smokeless Tobacco Never     Social History     Substance and Sexual Activity   Drug Use Never       Family History:    Family History   Problem Relation Age of Onset    Diabetes Mother     Alcohol abuse Father     Heart disease Brother     Cancer Sister         Thyroid    Cancer Sister         Colon    Cancer Brother         Throat    Cancer Brother     Diabetes Sister     Mental illness Neg Hx          OBJECTIVE:    Vitals:   Blood Pressure: 112/55 (24 1500)  Pulse: 60 (24 1500)  Temperature: 98.2 °F (36.8 °C) (24 1500)  Temp Source: Oral (24 1500)  Respirations: 18 (24 1500)  Height: 6' 2\" (188 cm) (24)  Weight - Scale: 105 kg (231 lb) (24)  SpO2: 98 % (24 " 1500)    Physical Exam:    General Appearance: Alert, cooperative, no distress.  HEENT: Head normocephalic, atraumatic, without obvious abnormality.  Heart: Normal rate and rhythm.  Lungs: Non-labored breathing. No respiratory distress.  Abdomen: Without distension.  Psychiatric: AAOx3  Lower Extremity:    Vascular:   DP: Right: 1+ Left: 2+  PT: Right: 1+ Left: 1+  CRT < 3 seconds at the digits. +1/4 edema noted at bilateral lower extremities.   Pedal hair is absent.   Skin temperature is warm bilaterally.    Musculoskeletal:  MMT is 5/5 in all muscle compartments bilaterally.   ROM at the 1st MPJ and ankle joint are reduced bilaterally with the leg extended.   Pain on palpation of left foot ulcers.       Dermatological:  Lower extremity wound(s) as noted below:    Wound #: 1  Location: Left plantar heel  Length 4 cm: Width 2 cm: Depth 0.2 cm:   Deepest Tissue Noted in Base: Subcutaneous  Probe to Bone: No  Peripheral Skin Description: Attached  Wound bed showed black discoloration out of the previous dressing  Drainage Amount: minimal, serous  Signs of Infection: No      Wound #: 2  Location: Left fifth metatarsal base  Length 1.5cm: Width 1.5cm: Depth 0.1cm:   Deepest Tissue Noted in Base: Subcutaneous  Probe to Bone: No  Peripheral Skin Description: Attached  Wound bed showed black discoloration out of the previous dressing  Drainage Amount: minimal, serous  Signs of Infection: No      Wound #: 3  Location: Left distal second toe  Length 1cm: Width 0.7cm: Depth 0.2cm:   Deepest Tissue Noted in Base: Subcutaneous  Probe to Bone: No  Peripheral Skin Description: Attached  Wound bed showed black discoloration of the previous dressing  Drainage Amount: minimal, serous  Signs of Infection: No      Neurological:  Gross sensation is diminished.   Light touch is diminished.   Protective sensation is diminished.    Clinical Images 09/02/24:                    Additional data:     Lab Results: I have personally reviewed  "pertinent labs including:    Results from last 7 days   Lab Units 09/02/24  0451   WBC Thousand/uL 9.77   HEMOGLOBIN g/dL 9.9*   HEMATOCRIT % 31.7*   PLATELETS Thousands/uL 236     Results from last 7 days   Lab Units 09/02/24  0451 08/31/24  0455 08/30/24  1121   POTASSIUM mmol/L 3.3*   < >  --    CHLORIDE mmol/L 103   < >  --    CO2 mmol/L 29   < >  --    CO2, I-STAT mmol/L  --   --  25   BUN mg/dL 22   < >  --    CREATININE mg/dL 1.03   < >  --    CALCIUM mg/dL 8.7   < >  --    GLUCOSE, ISTAT mg/dl  --   --  206*    < > = values in this interval not displayed.     Results from last 7 days   Lab Units 08/31/24  0455   INR  1.33*       Cultures: I have personally reviewed pertinent cultures including:              Imaging: I have personally reviewed pertinent reports in PACS.  EKG, Pathology, and Other Studies: I have personally reviewed pertinent reports.    Time Spent for Care: 30 minutes.  More than 50% of total time spent on counseling and coordination of care as described above.      ** Please Note: Portions of the record may have been created with voice recognition software. Occasional wrong word or \"sound a like\" substitutions may have occurred due to the inherent limitations of voice recognition software. Read the chart carefully and recognize, using context, where substitutions have occurred. **   "

## 2024-09-02 NOTE — DISCHARGE INSTR - OTHER ORDERS
Incisional care:   Wash incision daily with soap and water.  Pat dry thoroughly.  Place clean, dry gauze to cover groin incision to keep area clean and dry and to prevent skin-skin contact      Podiatry wound care Nursing Instructions:   Please apply Adaptic and Betadine paint. Then cover with 4x4 dry gauze (apply additional gauze as padding to the plantar heel wound and the distal second toe wound) And secure with rolled gauze and tape.   Please change dressing every Monday, Wednesday, and Friday .     _________________________________________________________________________________________________

## 2024-09-03 ENCOUNTER — DOCUMENTATION (OUTPATIENT)
Dept: VASCULAR SURGERY | Facility: CLINIC | Age: 79
End: 2024-09-03

## 2024-09-03 LAB
ANION GAP SERPL CALCULATED.3IONS-SCNC: 6 MMOL/L (ref 4–13)
ANION GAP SERPL CALCULATED.3IONS-SCNC: 7 MMOL/L (ref 4–13)
BUN SERPL-MCNC: 24 MG/DL (ref 5–25)
BUN SERPL-MCNC: 24 MG/DL (ref 5–25)
CALCIUM SERPL-MCNC: 8.5 MG/DL (ref 8.4–10.2)
CALCIUM SERPL-MCNC: 8.7 MG/DL (ref 8.4–10.2)
CHLORIDE SERPL-SCNC: 102 MMOL/L (ref 96–108)
CHLORIDE SERPL-SCNC: 102 MMOL/L (ref 96–108)
CO2 SERPL-SCNC: 30 MMOL/L (ref 21–32)
CO2 SERPL-SCNC: 31 MMOL/L (ref 21–32)
CREAT SERPL-MCNC: 1.03 MG/DL (ref 0.6–1.3)
CREAT SERPL-MCNC: 1.33 MG/DL (ref 0.6–1.3)
ERYTHROCYTE [DISTWIDTH] IN BLOOD BY AUTOMATED COUNT: 15.6 % (ref 11.6–15.1)
EST. AVERAGE GLUCOSE BLD GHB EST-MCNC: 154 MG/DL
GFR SERPL CREATININE-BSD FRML MDRD: 49 ML/MIN/1.73SQ M
GFR SERPL CREATININE-BSD FRML MDRD: 67 ML/MIN/1.73SQ M
GLUCOSE SERPL-MCNC: 107 MG/DL (ref 65–140)
GLUCOSE SERPL-MCNC: 126 MG/DL (ref 65–140)
GLUCOSE SERPL-MCNC: 175 MG/DL (ref 65–140)
GLUCOSE SERPL-MCNC: 183 MG/DL (ref 65–140)
GLUCOSE SERPL-MCNC: 197 MG/DL (ref 65–140)
GLUCOSE SERPL-MCNC: 215 MG/DL (ref 65–140)
HBA1C MFR BLD: 7 %
HCT VFR BLD AUTO: 30.4 % (ref 36.5–49.3)
HGB BLD-MCNC: 9.5 G/DL (ref 12–17)
MAGNESIUM SERPL-MCNC: 1.8 MG/DL (ref 1.9–2.7)
MCH RBC QN AUTO: 26.2 PG (ref 26.8–34.3)
MCHC RBC AUTO-ENTMCNC: 31.3 G/DL (ref 31.4–37.4)
MCV RBC AUTO: 84 FL (ref 82–98)
PHOSPHATE SERPL-MCNC: 1.7 MG/DL (ref 2.3–4.1)
PLATELET # BLD AUTO: 259 THOUSANDS/UL (ref 149–390)
PMV BLD AUTO: 11.2 FL (ref 8.9–12.7)
POTASSIUM SERPL-SCNC: 3.6 MMOL/L (ref 3.5–5.3)
POTASSIUM SERPL-SCNC: 4 MMOL/L (ref 3.5–5.3)
RBC # BLD AUTO: 3.63 MILLION/UL (ref 3.88–5.62)
SODIUM SERPL-SCNC: 139 MMOL/L (ref 135–147)
SODIUM SERPL-SCNC: 139 MMOL/L (ref 135–147)
WBC # BLD AUTO: 8.15 THOUSAND/UL (ref 4.31–10.16)

## 2024-09-03 PROCEDURE — 80048 BASIC METABOLIC PNL TOTAL CA: CPT | Performed by: SURGERY

## 2024-09-03 PROCEDURE — 83735 ASSAY OF MAGNESIUM: CPT

## 2024-09-03 PROCEDURE — 84100 ASSAY OF PHOSPHORUS: CPT

## 2024-09-03 PROCEDURE — 94664 DEMO&/EVAL PT USE INHALER: CPT

## 2024-09-03 PROCEDURE — 93005 ELECTROCARDIOGRAM TRACING: CPT

## 2024-09-03 PROCEDURE — 83036 HEMOGLOBIN GLYCOSYLATED A1C: CPT | Performed by: STUDENT IN AN ORGANIZED HEALTH CARE EDUCATION/TRAINING PROGRAM

## 2024-09-03 PROCEDURE — 94760 N-INVAS EAR/PLS OXIMETRY 1: CPT

## 2024-09-03 PROCEDURE — 82948 REAGENT STRIP/BLOOD GLUCOSE: CPT

## 2024-09-03 PROCEDURE — 97116 GAIT TRAINING THERAPY: CPT

## 2024-09-03 PROCEDURE — 80048 BASIC METABOLIC PNL TOTAL CA: CPT

## 2024-09-03 PROCEDURE — 99232 SBSQ HOSP IP/OBS MODERATE 35: CPT | Performed by: PODIATRIST

## 2024-09-03 PROCEDURE — 85027 COMPLETE CBC AUTOMATED: CPT | Performed by: SURGERY

## 2024-09-03 PROCEDURE — 97530 THERAPEUTIC ACTIVITIES: CPT

## 2024-09-03 PROCEDURE — 99024 POSTOP FOLLOW-UP VISIT: CPT | Performed by: SURGERY

## 2024-09-03 RX ORDER — OXYCODONE HYDROCHLORIDE 5 MG/1
5 TABLET ORAL EVERY 6 HOURS PRN
Qty: 5 TABLET | Refills: 0 | Status: CANCELLED | OUTPATIENT
Start: 2024-09-03 | End: 2024-09-13

## 2024-09-03 RX ORDER — LANOLIN ALCOHOL/MO/W.PET/CERES
400 CREAM (GRAM) TOPICAL 2 TIMES DAILY
Status: COMPLETED | OUTPATIENT
Start: 2024-09-03 | End: 2024-09-04

## 2024-09-03 RX ADMIN — MAGNESIUM OXIDE TAB 400 MG (241.3 MG ELEMENTAL MG) 400 MG: 400 (241.3 MG) TAB at 18:12

## 2024-09-03 RX ADMIN — PANTOPRAZOLE SODIUM 40 MG: 40 TABLET, DELAYED RELEASE ORAL at 05:18

## 2024-09-03 RX ADMIN — ACETAMINOPHEN 975 MG: 325 TABLET ORAL at 13:14

## 2024-09-03 RX ADMIN — INSULIN GLARGINE 15 UNITS: 100 INJECTION, SOLUTION SUBCUTANEOUS at 21:44

## 2024-09-03 RX ADMIN — PENTOXIFYLLINE 400 MG: 400 TABLET, EXTENDED RELEASE ORAL at 17:06

## 2024-09-03 RX ADMIN — INSULIN LISPRO 2 UNITS: 100 INJECTION, SOLUTION INTRAVENOUS; SUBCUTANEOUS at 21:44

## 2024-09-03 RX ADMIN — TORSEMIDE 20 MG: 20 TABLET ORAL at 08:04

## 2024-09-03 RX ADMIN — SENNOSIDES 8.6 MG: 8.6 TABLET, FILM COATED ORAL at 08:04

## 2024-09-03 RX ADMIN — AMLODIPINE BESYLATE 5 MG: 5 TABLET ORAL at 08:04

## 2024-09-03 RX ADMIN — PREGABALIN 100 MG: 50 CAPSULE ORAL at 17:06

## 2024-09-03 RX ADMIN — ATORVASTATIN CALCIUM 40 MG: 40 TABLET, FILM COATED ORAL at 17:06

## 2024-09-03 RX ADMIN — PREGABALIN 100 MG: 50 CAPSULE ORAL at 08:04

## 2024-09-03 RX ADMIN — HEPARIN SODIUM 5000 UNITS: 5000 INJECTION INTRAVENOUS; SUBCUTANEOUS at 21:45

## 2024-09-03 RX ADMIN — POTASSIUM PHOSPHATE, MONOBASIC POTASSIUM PHOSPHATE, DIBASIC 30 MMOL: 224; 236 INJECTION, SOLUTION, CONCENTRATE INTRAVENOUS at 18:49

## 2024-09-03 RX ADMIN — RANOLAZINE 1000 MG: 500 TABLET, FILM COATED, EXTENDED RELEASE ORAL at 08:04

## 2024-09-03 RX ADMIN — CLONIDINE HYDROCHLORIDE 0.1 MG: 0.1 TABLET ORAL at 23:49

## 2024-09-03 RX ADMIN — PENTOXIFYLLINE 400 MG: 400 TABLET, EXTENDED RELEASE ORAL at 11:36

## 2024-09-03 RX ADMIN — DULOXETINE HYDROCHLORIDE 30 MG: 30 CAPSULE, DELAYED RELEASE ORAL at 08:04

## 2024-09-03 RX ADMIN — ACETAMINOPHEN 975 MG: 325 TABLET ORAL at 21:45

## 2024-09-03 RX ADMIN — CLOPIDOGREL BISULFATE 75 MG: 75 TABLET ORAL at 08:04

## 2024-09-03 RX ADMIN — FENOFIBRATE 145 MG: 145 TABLET ORAL at 08:04

## 2024-09-03 RX ADMIN — METOPROLOL TARTRATE 25 MG: 25 TABLET, FILM COATED ORAL at 21:45

## 2024-09-03 RX ADMIN — PREGABALIN 100 MG: 50 CAPSULE ORAL at 21:45

## 2024-09-03 RX ADMIN — HEPARIN SODIUM 5000 UNITS: 5000 INJECTION INTRAVENOUS; SUBCUTANEOUS at 13:14

## 2024-09-03 RX ADMIN — INSULIN LISPRO 2 UNITS: 100 INJECTION, SOLUTION INTRAVENOUS; SUBCUTANEOUS at 11:36

## 2024-09-03 RX ADMIN — HEPARIN SODIUM 5000 UNITS: 5000 INJECTION INTRAVENOUS; SUBCUTANEOUS at 05:18

## 2024-09-03 RX ADMIN — INSULIN LISPRO 1 UNITS: 100 INJECTION, SOLUTION INTRAVENOUS; SUBCUTANEOUS at 17:06

## 2024-09-03 RX ADMIN — ISOSORBIDE MONONITRATE 90 MG: 60 TABLET, EXTENDED RELEASE ORAL at 08:04

## 2024-09-03 RX ADMIN — PENTOXIFYLLINE 400 MG: 400 TABLET, EXTENDED RELEASE ORAL at 08:05

## 2024-09-03 RX ADMIN — ASPIRIN 81 MG: 81 TABLET, COATED ORAL at 08:04

## 2024-09-03 RX ADMIN — ACETAMINOPHEN 975 MG: 325 TABLET ORAL at 05:18

## 2024-09-03 RX ADMIN — RANOLAZINE 1000 MG: 500 TABLET, FILM COATED, EXTENDED RELEASE ORAL at 17:06

## 2024-09-03 RX ADMIN — METOPROLOL TARTRATE 25 MG: 25 TABLET, FILM COATED ORAL at 08:04

## 2024-09-03 NOTE — CASE MANAGEMENT
Case Management Discharge Planning Note    Patient name [facility] Horne  [facility] [id number]  : 1945 Date 9/3/2024       Current Admission Date: 2024  Current Admission Diagnosis:Peripheral artery disease (HCC)   Patient Active Problem List    Diagnosis Date Noted Date Diagnosed    History of DVT (deep vein thrombosis) 2024     Diabetic ulcer of toe of left foot associated with type 2 diabetes mellitus, limited to breakdown of skin (HCC) 2024     Plantar fasciitis, right 07/10/2024     Acute deep vein thrombosis (DVT) of left peroneal vein ([facility]) 2024     Iron deficiency anemia 2024     Shortness of breath 2024     History of colon polyps 2024     Duodenal ulcer 2024     Acute blood loss anemia 2024     Multiple gastric ulcers 2023     Iron deficiency anemia due to chronic blood loss 2023     Melena 2023     Symptomatic anemia 2023     Acute respiratory distress 2023     Frequent PVCs 2023     Acute on chronic diastolic heart failure (HCC) 2023     Stage 3b chronic kidney disease ([facility]) 2023     Diabetic ulcer of right midfoot associated with diabetes mellitus due to underlying condition, limited to breakdown of skin ([facility]) 06/15/2023     Hypertensive urgency 2023     Elevated troponin level not due myocardial infarction 2023     Stable angina pectoris 2023     Chronic kidney disease-mineral and bone disorder 2022     Mild aortic stenosis 2022     Chronic HFpEF/Moderate pHTN (HFpEF) ([facility]) 11/10/2022     Gross hematuria 2022     Platelets decreased ([facility]) 2022     Acute kidney injury superimposed on chronic kidney disease  ([facility]) 2022     Actinic keratoses 2022     Type 2 diabetes mellitus with diabetic peripheral angiopathy without gangrene, with long-term current use of insulin ([facility]) 2022     Varicose veins of left lower  extremity 06/25/2021     History of endovascular stent graft for abdominal aortic aneurysm (AAA) 06/25/2021     Bruit (arterial) 06/25/2021     Peripheral artery disease (HCC) 06/25/2021     Type 2 diabetes mellitus with diabetic polyneuropathy, with long-term current use of insulin (HCC) 06/10/2021     Mixed hyperlipidemia 05/11/2021     Primary hypertension 05/11/2021     Coronary artery disease involving native coronary artery of native heart without angina pectoris 05/11/2021     S/P angioplasty with stent 05/11/2021     Hx of CABG 05/11/2021     S/P AAA repair 05/11/2021     S/P prostatectomy 05/11/2021     H/O prostate cancer 05/11/2021       LOS (days): 4  Geometric Mean LOS (GMLOS) (days): 3.8  Days to GMLOS:-0.4     OBJECTIVE:  Risk of Unplanned Readmission Score: 21.05         Current admission status: Inpatient   Preferred Pharmacy:   [facility] [address]  [address]  [facility] [address]  Phone: [phone] Fax: [phone]    [facility] [place] [facility] [address] [place] [address] [address] [facility] [address]  [facility] [address]  Phone: [phone] Fax: [phone]    [facility] - [place], [place] - [address] [place] [address] [address] [facility] [address]  Phone: [phone] Fax: [phone]    Primary Care Provider: Frank Lombardi, DO    Primary Insurance: [facility]  Secondary Insurance:     DISCHARGE DETAILS:    Contacts  Patient Contacts: [place]  Relationship to Patient:: Family  Contact Method: Phone  Reason/Outcome: Discharge Planning    Other Referral/Resources/Interventions Provided:  Interventions: [facility]  Referral Comments: Per parashute communication, order for wheelchair and hospital bed has been approved and is in the process of delivery scheduling. CM met with pt at bedside and also spoke with pt's daughter via TC. At this time pt and daughter feel pt no longer needs hospital bed or wheelchair and requested  order cancelled. Ordered cancelled via parashute as requested. Currently pt accepted for resumption of care with [facility] for SN/PT/OT. [facility] reserved in [facility]. Daughter to provide transport home on discharge. CM team will continue to follow and remain available for additional discharge needs and or concerns.      Treatment Team Recommendation: Home with Home Health Care  Discharge Destination Plan:: Home with Home Health Care    IMM Given (Date):: 09/03/24 (copy placed in medical records bin.)  IMM Given to:: Patient

## 2024-09-03 NOTE — TELEPHONE ENCOUNTER
I do place them surgically and deal with them afterwards.  What does he need, specifically? I don't do addition of cuffs per se, if he continues to leak... but happy to see.

## 2024-09-03 NOTE — CASE MANAGEMENT
Case Management Assessment & Discharge Planning Note    Patient name Thomas Horne  Location [facility] 520/PPHP 520-01 MRN [id number]  : 1945 Date 9/3/2024       Current Admission Date: 2024  Current Admission Diagnosis:Peripheral artery disease (HCC)   Patient Active Problem List    Diagnosis Date Noted Date Diagnosed    History of DVT (deep vein thrombosis) 2024     Diabetic ulcer of toe of left foot associated with type 2 diabetes mellitus, limited to breakdown of skin (HCC) 2024     Plantar fasciitis, right 07/10/2024     Acute deep vein thrombosis (DVT) of left peroneal vein ([facility]) 2024     Iron deficiency anemia 2024     Shortness of breath 2024     History of colon polyps 2024     Duodenal ulcer 2024     Acute blood loss anemia 2024     Multiple gastric ulcers 2023     Iron deficiency anemia due to chronic blood loss 2023     Melena 2023     Symptomatic anemia 2023     Acute respiratory distress 2023     Frequent PVCs 2023     Acute on chronic diastolic heart failure (HCC) 2023     Stage 3b chronic kidney disease ([facility]) 2023     Diabetic ulcer of right midfoot associated with diabetes mellitus due to underlying condition, limited to breakdown of skin ([facility]) 06/15/2023     Hypertensive urgency 2023     Elevated troponin level not due myocardial infarction 2023     Stable angina pectoris 2023     Chronic kidney disease-mineral and bone disorder 2022     Mild aortic stenosis 2022     Chronic HFpEF/Moderate pHTN (HFpEF) ([facility]) 11/10/2022     Gross hematuria 2022     Platelets decreased ([facility]) 2022     Acute kidney injury superimposed on chronic kidney disease  ([facility]) 2022     Actinic keratoses 2022     Type 2 diabetes mellitus with diabetic peripheral angiopathy without gangrene, with long-term current use of insulin ([facility]) 2022     Varicose veins of  left lower extremity 06/25/2021     History of endovascular stent graft for abdominal aortic aneurysm (AAA) 06/25/2021     Bruit (arterial) 06/25/2021     Peripheral artery disease (HCC) 06/25/2021     Type 2 diabetes mellitus with diabetic polyneuropathy, with long-term current use of insulin (HCC) 06/10/2021     Mixed hyperlipidemia 05/11/2021     Primary hypertension 05/11/2021     Coronary artery disease involving native coronary artery of native heart without angina pectoris 05/11/2021     S/P angioplasty with stent 05/11/2021     Hx of CABG 05/11/2021     S/P AAA repair 05/11/2021     S/P prostatectomy 05/11/2021     H/O prostate cancer 05/11/2021       LOS (days): 4  Geometric Mean LOS (GMLOS) (days): 3.8  Days to GMLOS:-0.1     OBJECTIVE:    Risk of Unplanned Readmission Score: 20.95     Current admission status: Inpatient    Preferred Pharmacy:   [facility] [address]  [address]  [facility] [address]  Phone: [phone] Fax: [phone]    [facility] [place] [place] [facility] [address] [place] [address] [address] [facility] [address]  [facility] [address]  Phone: [phone] Fax: [phone]    [facility] - [place], [place] - [address] [facility]  [address] [facility] [address]  Phone: [phone] Fax: [phone]    Primary Care Provider: Frank Lombardi, DO    Primary Insurance: [facility]  Secondary Insurance:     ASSESSMENT:  Active Health Care Proxies       [place] Hodges Health Care Representative - Daughter   Primary Phone: [phone] (Mobile)                 Advance Directives  Does patient have a Health Care POA?: No  Does patient have Advance Directives?: No  Primary Contact: [place]ley Hodges (Daughter)    Patient Information  Admitted from:: Home  Mental Status: Alert  During Assessment patient was accompanied by: Daughter  Assessment information provided by:: Patient, Daughter  Support Systems: Daughter, Family  members  Home entry access options. Select all that apply.: Stairs (daughter looking into installing outdoor ramp, requesting resources for same.)  Number of steps to enter home.: 4  Type of Current Residence: 3 story home  Upon entering residence, is there a bedroom on the main floor (no further steps)?: Yes (daughter arranging first floor set-up for discharge.)  Upon entering residence, is there a bathroom on the main floor (no further steps)?: Yes (Half bath, full bath on second floor.)  Living Arrangements: Lives w/ Daughter    Activities of Daily Living Prior to Admission  Functional Status: Independent (daughter assists with care as needed.)  Completes ADLs independently?: Yes  Ambulates independently?: Yes  Does patient use assisted devices?: No  Does patient currently own DME?: Yes  What DME does the patient currently own?: Walker  Does patient have a history of Outpatient Therapy (PT/OT)?: Yes  Does the patient have a history of Short-Term Rehab?: Yes ([facility])  Does patient have a history of HHC?: Yes ([facility])  Does patient currently have HHC?: Yes    Current Home Health Care  Type of Current Home Care Services: Home OT, Nurse visit, Home PT  Current Home Health Agency:: [facility]  Current Home Health Follow-Up Provider:: PCP    Patient Information Continued  Does patient have prescription coverage?: Yes  Does patient have a history of substance abuse?: No  Does patient have a history of Mental Health Diagnosis?: No    Means of Transportation  Means of Transport to Appts:: Family transport (Daughter provides transport to and from all medical appointments and errands)      Social Determinants of Health (SDOH)      Flowsheet Row Most Recent Value   Housing Stability    In the last 12 months, was there a time when you were not able to pay the mortgage or rent on time? N   At any time in the past 12 months, were you homeless or living in a shelter (including now)? N    Transportation Needs    In the past 12 months, has lack of transportation kept you from medical appointments or from getting medications? no   In the past 12 months, has lack of transportation kept you from meetings, work, or from getting things needed for daily living? No   Food Insecurity    Within the past 12 months, you worried that your food would run out before you got the money to buy more. Never true   Within the past 12 months, the food you bought just didn't last and you didn't have money to get more. Never true   Utilities    In the past 12 months has the electric, gas, oil, or water company threatened to shut off services in your home? No            DISCHARGE DETAILS:    Discharge planning discussed with:: Pt and daughter at bedside  Freedom of Choice: Yes  Comments - Freedom of Choice: After care recommendations discussed. Pt requesting resumption of care of [facility]NA Re: wound care and  PT/OT with [facility].  CM contacted family/caregiver?: Yes (Daughter at bedside)  Were Treatment Team discharge recommendations reviewed with patient/caregiver?: Yes  Did patient/caregiver verbalize understanding of patient care needs?: Yes  Were patient/caregiver advised of the risks associated with not following Treatment Team discharge recommendations?: Yes    Contacts  Patient Contacts: [place]  Relationship to Patient:: Family  Contact Method: In Person  Reason/Outcome: Discharge Planning, Referral    Requested Home Health Care         Is the patient interested in HHC at discharge?: Yes  Home Health Discipline requested:: Nursing, Occupational Therapy, Physical Therapy  Home Health Agency Name:: [facility] External Referral Reason (only applicable if external HHA name selected): Patient has established relationship with provider  Home Health Follow-Up Provider:: PCP  Home Health Services Needed:: Evaluate Functional Status and Safety, Gait/ADL Training, Strengthening/Theraputic Exercises to  Improve Function, Post-Op Care and Assessment, Wound/Ostomy Care  Homebound Criteria Met:: Requires the Assistance of Another Person for Safe Ambulation or to Leave the Home  Supporting Clincal Findings:: Limited Endurance, Fatigues Easliy in Short Distances    Other Referral/Resources/Interventions Provided:  Interventions: [facility]  Referral Comments: CM met with pt and daughter at bedside in order to introduce CM role and complete CM assessment. Pt resides with daughter in a three story home with 3 GABE. Daughter inquiring on options for installing an outdoor ramp for easier access into the home. Resources provided on same, daughter to make arrangements on her own accord post d/c. Plan for daughter to also make arrangements for a first floor setup up at home for pt prior to d/c. Pt has a half bath on the first floor, main bathroom on second floor with one flight of steps for access. Pt IPTA with assistance from daughter as needed. Daughter provides transport to and from all appointments and errands. Pt states he is currently active with [facility] for SN Re: wound care and PT/OT, pt requesting HUY referral for same. DME needs also discussed. Daughter requesting hospital bed and wheelchair for community distances, discussed with vascular surgery, hospital bed and wheelchair order to be placed prior to d/c.     HUY referral placed to [facility] via [facility]. Hospital bed and wheelchair ordered via parashute and pending order review and approval, CM team will continue to follow    Treatment Team Recommendation: Home with Home Health Care  Discharge Destination Plan:: Home with Home Health Care  Transport at Discharge : Automobile, Family

## 2024-09-03 NOTE — PHYSICAL THERAPY NOTE
PHYSICAL THERAPY TREATMENT  NAME:  Thomas Horne  DATE: 09/03/24    AGE:   79 y.o.  Mrn:   [id number]  ADMIT DX:  Peripheral arteriosclerosis (HCC) [I70.209]    Past Medical History:   Diagnosis Date    Anemia     CAD (coronary artery disease)     Callus     Cancer (HCC)     prostate    CHF (congestive heart failure) (HCC)     Chronic kidney disease     Clotting disorder (HCC)     Coronary artery disease 1988    Deep vein thrombosis (HCC)     Diabetes mellitus (HCC)     Difficulty walking     Duodenal ulcer     Heart disease 1988    Hyperlipidemia     Hypertension     Myocardial infarction (HCC)     Neuropathy     Bilateral feet    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)     Plantar fasciitis     Sleep apnea     Could not tolerate CPAP     Past Surgical History:   Procedure Laterality Date    ABDOMINAL AORTIC ANEURYSM REPAIR      Stented    ADENOIDECTOMY      BACK SURGERY  1985    CARDIAC CATHETERIZATION Left 10/19/2022    Procedure: Cardiac Left Heart Cath;  Surgeon: Danielle Pereira MD;  Location: [facility];  Service: Cardiology    CARDIAC SURGERY  2002    3 cardiac bypass then angioplasty 7/2020    CHOLECYSTECTOMY      COLONOSCOPY  02/14/2024    CORONARY ARTERY BYPASS GRAFT      IR LOWER EXTREMITY ANGIOGRAM  11/01/2023    IR LOWER EXTREMITY ANGIOGRAM  08/07/2024    LAMINECTOMY  1990    HI BYPASS W/VEIN FEMORAL-POPLITEAL Right 11/16/2023    Procedure: BYPASS FEMORAL-POPLITEAL WITH CRYO VEIN, RIGHT FEMORAL ENDARTERECTOMY;  Surgeon: Vasquez Clark MD;  Location: [facility];  Service: Vascular    HI BYPASS W/VEIN FEMORAL-POPLITEAL Left 8/30/2024    Procedure: left lower extremity above knee popliteal to below knee popliteal artery bypass with PTFE graft;  Surgeon: Vasquez Clark MD;  Location: [facility];  Service: Vascular    HI SLCTV CATHJ 3RD+ ORD SLCTV ABDL PEL/LXTR BRNCH Right 11/01/2023    Procedure: ARTERIOGRAM Right lower extremity arteriogram with CO2 via right groin access;   "Surgeon: Vasquez Clark MD;  Location: [facility];  Service: Vascular    FL SLCTV CATHJ 3RD+ ORD SLCTV ABDL PEL/LXTR BRNCH Left 08/07/2024    Procedure: diagnostic LLE Arteriogram;  Surgeon: Vasquez Clark MD;  Location: [facility];  Service: Vascular    PROSTATE SURGERY      TONSILLECTOMY      URINARY SPHINCTER IMPLANT         Length Of Stay: 4     09/03/24 1404   PT Last Visit   PT Visit Date 09/03/24   Note Type   Note Type Treatment   End of Consult   Patient Position at End of Consult Supine;Bed/Chair alarm activated;Other (comment)  (LE elevated and heels offloaded)   Pain Assessment   Pain Assessment Tool 0-10   Pain Score No Pain   Restrictions/Precautions   Weight Bearing Precautions Per Order   (per podiatry \"WBAT in CAM bootLLE; minimize weightbearing for ADLs + offloading when non ambulatory\")   Braces or Orthoses CAM Boot  (LLE - to be used for limited  ambualtion/ stairs only per podiatry)   Other Precautions Chair Alarm;Bed Alarm;Fall Risk;Pain   General   Chart Reviewed Yes   Cognition   Overall Cognitive Status WFL   Arousal/Participation Alert   Attention Within functional limits   Orientation Level Oriented X4   Memory Within functional limits   Following Commands Follows all commands and directions without difficulty   Comments pleasant and cooperative   Subjective   Subjective pt agreeable to mobilize w/ PT and trial stairs for d/c w/ CAM boot   Bed Mobility   Supine to Sit 5  Supervision   Additional items Assist x 1;Increased time required;Verbal cues   Sit to Supine 4  Minimal assistance   Additional items Assist x 1;Increased time required;Verbal cues;Impulsive;LE management  (min A for LLE onto bed)   Transfers   Sit to Stand 5  Supervision   Additional items Assist x 1;Increased time required;Verbal cues   Stand to Sit 5  Supervision   Additional items Assist x 1;Increased time required;Verbal cues   Toilet transfer 4  Minimal assistance   Additional items Assist " x 1   Additional Comments RW + CAM boot   Ambulation/Elevation   Gait pattern Decreased foot clearance;Excessively slow;Inconsistent ira;Foward flexed   Gait Assistance   (CGA)   Additional items Assist x 1;Verbal cues  (for upright posture)   Assistive Device Rolling walker   Distance 35' (steps) + 35'   Stair Management Assistance   (CGA)   Additional items Assist x 1;Verbal cues;Tactile cues   Stair Management Technique One rail R;Step to pattern;Foreward;Backward  (up forward and down backward w/ R rail use + CAM boot on LLE)   Number of Stairs 6  (6 up + 6 down)   Balance   Static Sitting Good   Dynamic Sitting Fair +   Static Standing Fair   Dynamic Standing Fair -   Ambulatory Fair -  (rw)   Endurance Deficit   Endurance Deficit Yes   Activity Tolerance   Activity Tolerance Patient tolerated treatment well;Patient limited by fatigue   Medical Staff Made Aware RN and CM for d/c planning recommendations   Assessment   Prognosis Good   Problem List Decreased strength;Decreased endurance;Impaired balance;Decreased mobility;Impaired sensation;Decreased skin integrity;Pain   Assessment PT saw pt for tx session as noted above w/ 1* focus on pt education on WBS ; CAM boot use ; gait / stair training for d/c home. Pt reports he has daughter w/ him on d/c; can have FFSU and has 6 GABE home; howver daughter is looking into having ramp installed.  Pt was educated on heel/ LE elevation when not ambualtory for promotion of wound healing. use of W/C for appts and for longer distnaces + more reliance on UE use of RW for offloading of LLE during limited ambulation distances + for ADLs at home on d/c. Pt continues to make good progress w/ ongoing skilled therapy. Will continue to follow on caseload   Barriers to Discharge None   Goals   Patient Goals go home; have wounds heal   STG Expiration Date 09/15/24   PT Treatment Day 1   Plan   Treatment/Interventions ADL retraining;Functional transfer training;LE  strengthening/ROM;Elevations;Therapeutic exercise;Endurance training;Patient/family training;Equipment eval/education;Bed mobility;Gait training;Compensatory technique education;Spoke to nursing;Spoke to case management;Spoke to advanced practitioner;OT   Progress Progressing toward goals   PT Frequency 3-5x/wk   Discharge Recommendation   Rehab Resource Intensity Level, PT III (Minimum Resource Intensity)   AM-PAC Basic Mobility Inpatient   Turning in Flat Bed Without Bedrails 4   Lying on Back to Sitting on Edge of Flat Bed Without Bedrails 4   Moving Bed to Chair 3   Standing Up From Chair Using Arms 3   Walk in Room 3   Climb 3-5 Stairs With Railing 3   Basic Mobility Inpatient Raw Score 20   Basic Mobility Standardized Score 43.99   [facility] Highest Level Of Mobility   -HLM Goal 6: Walk 10 steps or more   -HLM Achieved 7: Walk 25 feet or more   End of Consult   Patient Position at End of Consult Supine;Bed/Chair alarm activated;All needs within reach  (heels offloaded.)       The patient's AM-PAC Basic Mobility Inpatient Short Form Raw Score is 20. A Raw score of greater than 16 suggests the patient may benefit from discharge to home. Please also refer to the recommendation of the Physical Therapist for safe discharge planning.          Fadia Huber, PT

## 2024-09-03 NOTE — RESTORATIVE TECHNICIAN NOTE
Restorative Technician Note      Patient Name: Thomas Horne     [facility] Tech Visit Date: 09/03/24  Note Type: Mobility  Patient Position Upon Consult: Supine  Activity Performed: Ambulated  Assistive Device: Roller walker  Patient Position at End of Consult: Bedside chair; All needs within reach

## 2024-09-03 NOTE — DISCHARGE INSTR - AVS FIRST PAGE
DISCHARGE INSTRUCTIONS  LEG/BYPASS SURGERY    ACTIVITY:   Limit your physical activity to walking for the first week and then increase your activity as tolerated.  If you become short of breath or tired, stop and rest.  You may require help with walking or feel more secure with something to lean on.  Walking up steps and normal activities may be resumed as you feel ready.  Most people tire easily for the first few weeks following leg surgery.  This improves as conditioning returns.  Avoid strenuous activity such as vigorous exercise.  Avoid heavy lifting (do not lift more than 15 pounds) for the first four weeks after surgery.  You should not drive a car for at least two weeks following discharge from the hospital and you are off all narcotic pain medication.  You may ride in a car.    DIET:  Resume your normal diet.  Good nutrition is important for healing of your incision.  If you are discharged on narcotics for pain control, continue taking your stool softeners until you are having regular bowel movements.    INCISION:  You should shower daily.  Wash incision daily with soap and water, but do not rub or scrub the incision; rinse thoroughly and pat dry.  You may have stitches or staples to close your incision and it is okay for these to get wet.  Do not bathe in a tub or swim for the first 4 week following surgery or if you have any open wounds.  It is normal to have swelling or discoloration around the incision.   If increasing redness or pain develops, call our office immediately.  Numbness in the region of the incision may occur following the surgery.  This normally improves over six to twelve months.    You may have surgical glue over your incisions. There are stitches present under the skin which will absorb on their own.   The glue is used to cover the access, assist in closure, and prevent contamination. This adhesive will darken and peel away on its own within one to two weeks. Do not pick at it.    If you  have a groin wound/incision, place a clean dry piece of gauze to cover your groin incision to keep incision clean and dry and prevent your skin from sticking together.  Change gauze daily.  You may have staples or stitches at your incisions.  These will be removed at your follow-up appointment or when they are ready to come out.  If you have a dressing over your surgical site, remove this on the second day after surgery.  If you have foot or leg wounds, please follow your podiatrist/wound care doctor's instructions for care.  If any of your incisions are open and require dressing changes, you will be given instructions for your daily incision care. If you are not able to change the dressings, a visiting nurse will be arranged.    DO NOT put any powders, creams, ointments, or lotions on your incision.    LEG SWELLING: Most patients have noticeable leg swelling after leg surgery. This usually improves within a few weeks. If swelling is present, elevate the leg whenever possible. Avoid sitting with the leg hanging down for prolonged periods of time.  Walking is beneficial.  An ACE bandage or support stocking may be helpful, but this should be discussed with your physician prior to use if you have a bypass.    FOLLOW UP STUDIES:  Doppler ultrasound studies are very important for long-term management.  Your surgeon will arrange this at your first postoperative visit.  Repeat studies are then scheduled every three months for the first year and periodically after this.    FOLLOW UP APPOINTMENTS:  Making and keeping follow up appointments and ultrasound tests are important to your recovery.  If you have difficulty making it to or keeping your follow up appointments, call the office.    If you have increased pain, fever >101.5, increased drainage, redness or a bad smell at your surgery site, new coldness/numbness of your arm or leg, please call us immediately and GO directly to the ER.    Anaid cavazos/ APOLLO Morales:  9/11/2024 at 2pm, [place] office  Appt w/ Dr. Clark: 10/3/2024 at 2:30pm, [place] office      PLEASE CALL THE OFFICE IF YOU HAVE ANY QUESTIONS  [phone]  [phone] [address], [place] [address]  [address] [place] [address]  [address] [facility], [address], [place] [address]  [address] [facility], 1st Floor, [place] [address]  [address] [facility], [address], [place] [address]  [address] [facility], [address], [place] [address]  [address] [place] [address]  [address] [facility], 1st Floor, [address], [place] [address]  [address], [place] [address]  [address] [facility], [facility] [address], [place] [address]   ____________________________________________________________________________________    Discharge Instructions - Podiatry    Weight Bearing Status: Weight bearing as tolerated in heel offloading shoe. Please minimize walking to short distances and use a knee scooter for longer distances. Walking up stairs to shower is okay, but minimize to once a day ideally               Pain: Continue analgesics as needed     Follow-up appointment instructions: Please make an appointment within one week of discharge with Dr. Arango. Contact sooner if any increase in pain, or signs of infection occur      Nursing Instructions: Remove dressings. Wash with soap and water. Apply santyl to the left foot wounds in a nickel thick layer. Apply gauze and a kerlix. Secure with tape. Change daily

## 2024-09-03 NOTE — PROGRESS NOTES
Podiatry - Progress Note  Patient: Thomas Horne 79 y.o. male   MRN: [id number]  PCP: Frank Lombardi, DO  Unit/Bed#: [facility] 520-01 Encounter: [id number]  Date Of Visit: 09/03/24    ASSESSMENT:    Thomas Horne is a 79 y.o. male with:    Peripheral artery disease  Now s/p distal superficial femoral artery to below knee popliteal artery bypass DOS: 8/30/24  Type 2 diabetes with diabetic polyneuropathy.  (Last A1c 7.0% 4/17/2024)  Type 2 diabetes with diabetic foot ulcer  History of DVT      PLAN:    Patient was seen and evaluated at bedside with Dr. Evans. All questions and concerns addressed.  Dressing was changed. The previous dressing was clean, dry and intact without strikethrough. The current dressing consists of Betadine Adaptic DSD. Next dressing change is planned on 9/4/24 with cross-olivo of the wound eschars with application of Santyl. All wounds are stable with eschars.   Patient's weight bearing status reviewed and discussed with patient and his daughter on the phone. Patient is amenable for any weight bearing instructions to help the wound healing. Patient was given a global pedi heel offloading shoe and was given a PT consult for walking in the shoe. He is asked to always wear the global pedi shoe when ambulating at home for short distance, and using a knee scooter or other device for outdoor or long distance ambulation to fully offload the foot. He and his daughter agreed with the plan. Will follow-up with the patient's weight bearing from PT.   Patients' lab and vital signs were reviewed. Patient is afebrile with no leukocytosis. Patient does not  meet the SIRS criteria. Will keep monitoring.  Continue local wound care, appreciate nursing assistance with dressing changes.  Follow-up with SLIM, vascular surgery, PT, OT, CM recommendations  Elevation and offloading on green foam wedges or pillows when non-ambulatory.  Rest of care per primary team.     Weightbearing status: Weightbearing as tolerated left   "foot in global pedi shoe in short distance. Non-weight bearing in scooter or other device in long distance.     SUBJECTIVE:     The patient was seen, evaluated, and assessed at bedside today. The patient was awake, alert, and in no acute distress. No acute events overnight. The patient reports No pain on the left foot but confused about the weight bearing status as well as the offloading device choice. Patient denies N/V/F/chills/SOB/CP.      OBJECTIVE:     Vitals:   /58 (BP Location: Left arm)   Pulse 58   Temp 97.9 °F (36.6 °C)   Resp 18   Ht 6' 2\" (1.88 m)   Wt 105 kg (231 lb)   SpO2 97%   BMI 29.66 kg/m²     Temp (24hrs), Av.9 °F (36.6 °C), Min:97.3 °F (36.3 °C), Max:98.2 °F (36.8 °C)      Physical Exam:     Lungs: Non labored breathing  Abdomen: Soft, non-tender.  Lower Extremity:  Cardiovascular status at baseline from admission.  Neurological status at baseline from admission.  Musculoskeletal status  at baseline from admission. No calf tenderness noted.     Left lower limb shows multiple foot wounds as below    Wound #: 1  Location: left plantar heel  Length 4cm: Width 2cm: Depth 0.2cm:   Deepest Tissue Noted in Base: eschar  Probe to Bone: No  Peripheral Skin Description: Attached  Granulation: 0% Fibrotic Tissue: 0% Necrotic Tissue: 100%   Drainage Amount: minimal, serous  Signs of Infection: No      Wound #: 2  Location: Left fifth metatarsal base  Length 1.5cm: Width 1.5cm: Depth 0.1cm:   Deepest Tissue Noted in Base: Eschar  Probe to Bone: No  Peripheral Skin Description: Attached  Granulation: 0% Fibrotic Tissue: 0% Necrotic Tissue: 100%   Drainage Amount: minimal, serous  Signs of Infection: No      Wound #: 3  Location: Left distal second toe   Length 1cm: Width 0.7cm: Depth 0.2cm:   Deepest Tissue Noted in Base: Eschar  Probe to Bone: No  Peripheral Skin Description: Attached  Granulation: 0% Fibrotic Tissue: 0% Necrotic Tissue: 100%   Drainage Amount: minimal, serous  Signs of " "Infection: No    Clinical Images 09/03/24:                        Additional Data:     Labs:    Results from last 7 days   Lab Units 09/03/24  0351   WBC Thousand/uL 8.15   HEMOGLOBIN g/dL 9.5*   HEMATOCRIT % 30.4*   PLATELETS Thousands/uL 259     Results from last 7 days   Lab Units 09/03/24  0351 08/31/24  0455 08/30/24  1121   POTASSIUM mmol/L 4.0   < >  --    CHLORIDE mmol/L 102   < >  --    CO2 mmol/L 31   < >  --    CO2, I-STAT mmol/L  --   --  25   BUN mg/dL 24   < >  --    CREATININE mg/dL 1.03   < >  --    CALCIUM mg/dL 8.7   < >  --    GLUCOSE, ISTAT mg/dl  --   --  206*    < > = values in this interval not displayed.     Results from last 7 days   Lab Units 08/31/24  0455   INR  1.33*       * I Have Reviewed All Lab Data Listed Above.    Recent Cultures (last 7 days):               Imaging: I have personally reviewed pertinent films in PACS  EKG, Pathology, and Other Studies: I have personally reviewed pertinent reports.    ** Please Note: Portions of the record may have been created with voice recognition software. Occasional wrong word or \"sound a like\" substitutions may have occurred due to the inherent limitations of voice recognition software. Read the chart carefully and recognize, using context, where substitutions have occurred. **      "

## 2024-09-03 NOTE — PLAN OF CARE
Problem: PHYSICAL THERAPY ADULT  Goal: Performs mobility at highest level of function for planned discharge setting.  See evaluation for individualized goals.  Description: Treatment/Interventions: ADL retraining, Functional transfer training, LE strengthening/ROM, Therapeutic exercise, Endurance training, Patient/family training, Equipment eval/education, Bed mobility, Gait training, Spoke to nursing, Spoke to case management, OT, Elevations          See flowsheet documentation for full assessment, interventions and recommendations.  Outcome: Progressing  Note: Prognosis: Good  Problem List: Decreased strength, Decreased endurance, Impaired balance, Decreased mobility, Impaired sensation, Decreased skin integrity, Pain  Assessment: PT saw pt for tx session as noted above w/ 1* focus on pt education on WBS ; CAM boot use ; gait / stair training for d/c home. Pt reports he has daughter w/ him on d/c; can have FFSU and has 6 GABE home; howver daughter is looking into having ramp installed.  Pt was educated on heel/ LE elevation when not ambualtory for promotion of wound healing. use of W/C for appts and for longer distnaces + more reliance on UE use of RW for offloading of LLE during limited ambulation distances + for ADLs at home on d/c. Pt continues to make good progress w/ ongoing skilled therapy. Will continue to follow on caseload  Barriers to Discharge: None     Rehab Resource Intensity Level, PT: III (Minimum Resource Intensity)    See flowsheet documentation for full assessment.

## 2024-09-03 NOTE — QUICK NOTE
Discussed with patient and daughter at bedside regarding weightbearing status to the left lower extremity.  Patient is to be weightbearing as tolerated in the cam boot to the left lower extremity.  Recommend patient minimize weightbearing is much as possible given multiple wounds to the left foot including the plantar aspect of the heel and the distal second toe.  Home wound care instructions updated and outpatient referrals to his podiatrist and wound care physician placed.  Patient and his daughter plan to have him set up on the first floor to minimize the number of stairs he needs to take in order to reach the bathroom as well as acquiring about obtaining a wheelchair and ramp for him.  Discussed patient with case management to ensure he gets the medical equipment and home VNA required for continued treatment.

## 2024-09-03 NOTE — PROGRESS NOTES
"Progress Note - Vascular Surgery   Thomas Horne 79 y.o. male MRN: [id number]  Unit/Bed#: [facility] 520-01 Encounter: [id number]    Assessment:  79-year-old male s/p left ak-pop to bk-pop bypass with PFTE graft on 8/30 for LLE CLTI    Plan:  -diet as tolerated  -upon d/c will go on ASA/Plavix  -follow up Podiatry for ongoing recommendations  -f/u WOCN for local LLE wound care  -analgesia and anti-emetics  -dvt ppx  -oob and ambulation TID  -encourage IS use  -PT/OT to revaluate patient as he felt uneasy on his feet  -dispo planning      Subjective/Objective     Subjective: no acute events overnight. Patient reports his LLE pain is improved although had some slight discomfort in his heel during examination w/ podiatry. He has been able to ambulate with the assistance of a walker but found rising from the commode more challenging. He has tolerated a diet well without issue and is voiding appropriately.    Objective: AVSS on RA, bradycardic while sleeping    Blood pressure 146/65, pulse (!) 53, temperature (!) 97.3 °F (36.3 °C), temperature source Oral, resp. rate 16, height 6' 2\" (1.88 m), weight 105 kg (231 lb), SpO2 98%.,Body mass index is 29.66 kg/m².      Intake/Output Summary (Last 24 hours) at 9/3/2024 0617  Last data filed at 9/3/2024 0525  Gross per 24 hour   Intake 360 ml   Output 1000 ml   Net -640 ml         Invasive Devices       Peripheral Intravenous Line  Duration             Peripheral IV 09/03/24 Right;Ventral (anterior) Forearm <1 day              Drain  Duration             External Urinary Catheter <1 day                    Physical Exam:  General: No acute distress, alert and oriented  CV: RRR  Lungs: Normal work of breathing   Abdomen: soft, non tender non distended  Extremities: LLE multiphasic PT, guaze and ace wrap to the distal forefoot, bypass incision sites c/d/I without overlying hematoma or fluctuance.   Skin: Warm, dry    Lab, Imaging and other studies:  Recent Labs     09/02/24  0451 " 09/03/24  0351   WBC 9.77 8.15   HGB 9.9* 9.5*    259   SODIUM 140 139   K 3.3* 4.0    102   CO2 29 31   BUN 22 24   CREATININE 1.03 1.03   GLUC 109 107   CALCIUM 8.7 8.7   MG 1.7*  --      VTE Pharmacologic Prophylaxis: Heparin  VTE Mechanical Prophylaxis: sequential compression device

## 2024-09-03 NOTE — RESTORATIVE TECHNICIAN NOTE
Restorative Technician Note      Patient Name: Thomas Horne     Toe unloading shoe ordered; Per Podiatry they are canceling toe unloading shoe.    Please contact [facility] PT Restorative tech on Epic Secure Chat  in regards to bracing instruction and/or adjustment.    Curry Mcginnis, Restorative Technician

## 2024-09-03 NOTE — RESTORATIVE TECHNICIAN NOTE
Restorative Technician Note      Patient Name: Thomas Horne     Restorative Tech Visit Date: 09/03/24  Note Type: Bracing, Initial consult  Patient Position Upon Consult: Supine  Activity Performed: Ambulated  Assistive Device: Roller walker  Brace Applied: Globoped Shoe (Heel Unloading) (XL; Donned to LLE)  Education Provided: Yes  Patient Position at End of Consult: Supine; All needs within reach  Nurse Communication: Nurse aware of consult, application of brace    Please contact BE PT Restorative tech on Epic Secure Chat  in regards to bracing instruction and/or adjustment.    Curry Mcginnis, Restorative Technician

## 2024-09-03 NOTE — PROGRESS NOTES
Vascular Nurse Navigator Post Op Education    Met with patient and daughter Whitley at bedside to introduce myself as Vascular Nurse Navigator and explained my role.  Patient is appropriate and accepting to education. Patient was educated with Review of written materials provided, Teachback, Explanation, Demonstration, and Question & Answer on expectations of post op care and recovery on  left ak-pop to bk-pop bypass with PFTE graft. Patient is a former smoker, quit 36 years ago, as such Smoking effects on the lungs, tobacco triggers, and Smoking cessation was reviewed. Education provided to patient and his daughter Whitley on infection prevention, activity limitations, when to call the office, importance of follow up, and incisional care.  Discharge instruction handout provided to patient to review.  Provided patient with a pack of disposable washcloths, a pack of guaze, and a bottle of chlorhexidine for incision care upon discharge.

## 2024-09-03 NOTE — TELEPHONE ENCOUNTER
Called and spoke with patient, appointment has been confirmed.  Patient said he should be discharged within 2 days.  Also gave patient address.

## 2024-09-04 LAB
ANION GAP SERPL CALCULATED.3IONS-SCNC: 8 MMOL/L (ref 4–13)
ATRIAL RATE: 62 BPM
BUN SERPL-MCNC: 19 MG/DL (ref 5–25)
CALCIUM SERPL-MCNC: 8.6 MG/DL (ref 8.4–10.2)
CHLORIDE SERPL-SCNC: 103 MMOL/L (ref 96–108)
CO2 SERPL-SCNC: 29 MMOL/L (ref 21–32)
CREAT SERPL-MCNC: 0.99 MG/DL (ref 0.6–1.3)
GFR SERPL CREATININE-BSD FRML MDRD: 71 ML/MIN/1.73SQ M
GLUCOSE SERPL-MCNC: 113 MG/DL (ref 65–140)
GLUCOSE SERPL-MCNC: 124 MG/DL (ref 65–140)
GLUCOSE SERPL-MCNC: 181 MG/DL (ref 65–140)
P AXIS: 35 DEGREES
POTASSIUM SERPL-SCNC: 3.7 MMOL/L (ref 3.5–5.3)
PR INTERVAL: 172 MS
QRS AXIS: -51 DEGREES
QRSD INTERVAL: 122 MS
QT INTERVAL: 522 MS
QTC INTERVAL: 529 MS
SODIUM SERPL-SCNC: 140 MMOL/L (ref 135–147)
T WAVE AXIS: 128 DEGREES
VENTRICULAR RATE: 62 BPM

## 2024-09-04 PROCEDURE — 99232 SBSQ HOSP IP/OBS MODERATE 35: CPT | Performed by: PODIATRIST

## 2024-09-04 PROCEDURE — 93010 ELECTROCARDIOGRAM REPORT: CPT | Performed by: INTERNAL MEDICINE

## 2024-09-04 PROCEDURE — 97164 PT RE-EVAL EST PLAN CARE: CPT

## 2024-09-04 PROCEDURE — 97530 THERAPEUTIC ACTIVITIES: CPT

## 2024-09-04 PROCEDURE — 99024 POSTOP FOLLOW-UP VISIT: CPT | Performed by: NURSE PRACTITIONER

## 2024-09-04 PROCEDURE — 94760 N-INVAS EAR/PLS OXIMETRY 1: CPT

## 2024-09-04 PROCEDURE — 80048 BASIC METABOLIC PNL TOTAL CA: CPT

## 2024-09-04 PROCEDURE — NC001 PR NO CHARGE: Performed by: SURGERY

## 2024-09-04 PROCEDURE — 99232 SBSQ HOSP IP/OBS MODERATE 35: CPT | Performed by: UROLOGY

## 2024-09-04 PROCEDURE — 97597 DBRDMT OPN WND 1ST 20 CM/<: CPT | Performed by: PODIATRIST

## 2024-09-04 PROCEDURE — 82948 REAGENT STRIP/BLOOD GLUCOSE: CPT

## 2024-09-04 RX ORDER — SENNOSIDES 8.6 MG
8.6 TABLET ORAL DAILY
COMMUNITY
Start: 2024-09-04

## 2024-09-04 RX ORDER — CLOPIDOGREL BISULFATE 75 MG/1
75 TABLET ORAL DAILY
Qty: 30 TABLET | Refills: 3 | Status: SHIPPED | OUTPATIENT
Start: 2024-09-04

## 2024-09-04 RX ORDER — ACETAMINOPHEN 325 MG/1
650 TABLET ORAL EVERY 6 HOURS PRN
COMMUNITY
Start: 2024-09-04

## 2024-09-04 RX ADMIN — RANOLAZINE 1000 MG: 500 TABLET, FILM COATED, EXTENDED RELEASE ORAL at 08:23

## 2024-09-04 RX ADMIN — CLOPIDOGREL BISULFATE 75 MG: 75 TABLET ORAL at 08:25

## 2024-09-04 RX ADMIN — TORSEMIDE 20 MG: 20 TABLET ORAL at 08:23

## 2024-09-04 RX ADMIN — PENTOXIFYLLINE 400 MG: 400 TABLET, EXTENDED RELEASE ORAL at 08:25

## 2024-09-04 RX ADMIN — MAGNESIUM OXIDE TAB 400 MG (241.3 MG ELEMENTAL MG) 400 MG: 400 (241.3 MG) TAB at 08:25

## 2024-09-04 RX ADMIN — ISOSORBIDE MONONITRATE 90 MG: 60 TABLET, EXTENDED RELEASE ORAL at 08:23

## 2024-09-04 RX ADMIN — ASPIRIN 81 MG: 81 TABLET, COATED ORAL at 08:24

## 2024-09-04 RX ADMIN — PANTOPRAZOLE SODIUM 40 MG: 40 TABLET, DELAYED RELEASE ORAL at 05:42

## 2024-09-04 RX ADMIN — FENOFIBRATE 145 MG: 145 TABLET ORAL at 08:23

## 2024-09-04 RX ADMIN — PREGABALIN 100 MG: 50 CAPSULE ORAL at 08:24

## 2024-09-04 RX ADMIN — METOPROLOL TARTRATE 25 MG: 25 TABLET, FILM COATED ORAL at 08:24

## 2024-09-04 RX ADMIN — COLLAGENASE SANTYL: 250 OINTMENT TOPICAL at 09:47

## 2024-09-04 RX ADMIN — HEPARIN SODIUM 5000 UNITS: 5000 INJECTION INTRAVENOUS; SUBCUTANEOUS at 05:42

## 2024-09-04 RX ADMIN — PENTOXIFYLLINE 400 MG: 400 TABLET, EXTENDED RELEASE ORAL at 12:18

## 2024-09-04 RX ADMIN — INSULIN LISPRO 1 UNITS: 100 INJECTION, SOLUTION INTRAVENOUS; SUBCUTANEOUS at 11:01

## 2024-09-04 RX ADMIN — ACETAMINOPHEN 975 MG: 325 TABLET ORAL at 05:42

## 2024-09-04 RX ADMIN — AMLODIPINE BESYLATE 5 MG: 5 TABLET ORAL at 08:25

## 2024-09-04 RX ADMIN — DULOXETINE HYDROCHLORIDE 30 MG: 30 CAPSULE, DELAYED RELEASE ORAL at 08:23

## 2024-09-04 RX ADMIN — ONDANSETRON 4 MG: 2 INJECTION INTRAMUSCULAR; INTRAVENOUS at 09:01

## 2024-09-04 NOTE — RESPIRATORY THERAPY NOTE
patient continues with clear breath sounds, no resp distress or SOB. Patient has not required prn UDN treatment in 5 days. Will D/C resp protocol and prn UDN treatments

## 2024-09-04 NOTE — CASE MANAGEMENT
Case Management Discharge Planning Note    Patient name Thomas Horne  Location [facility] 520/[facility] 520-01 MRN [id number]  : 1945 Date 2024       Current Admission Date: 2024  Current Admission Diagnosis:Peripheral artery disease (HCC)      LOS (days): 5  Geometric Mean LOS (GMLOS) (days): 3.8  Days to GMLOS:-1.2     OBJECTIVE:  Risk of Unplanned Readmission Score: 23.61   Current admission status: Inpatient   Primary Insurance: [employer]    DISCHARGE DETAILS:  Discharge planning discussed with:: Patient  Freedom of Choice: Yes  CM contacted family/caregiver?: Yes  Were Treatment Team discharge recommendations reviewed with patient/caregiver?: Yes  Did patient/caregiver verbalize understanding of patient care needs?: Yes  Were patient/caregiver advised of the risks associated with not following Treatment Team discharge recommendations?: Yes    Contacts  Patient Contacts: Whitley Farrukhvicente (pt's daughter)  Relationship to Patient:: Family  Contact Method: Phone  Phone Number: [phone] (mobile)  Reason/Outcome: Emergency Contact    Requested Home Health Care         Is the patient interested in HHC at discharge?: Yes  Home Health Discipline requested:: Nursing, Physical Therapy, Occupational Therapy  [facility] Reason (only applicable if external HHA name selected): Services not provided in network or near patient location  Home Health Follow-Up Provider:: PCP  Home Health Services Needed:: Post-Op Care and Assessment, Evaluate Functional Status and Safety, Gait/ADL Training, Strengthening/Theraputic Exercises to Improve Function  Homebound Criteria Met:: Requires the Assistance of Another Person for Safe Ambulation or to Leave the Home  Supporting Clincal Findings:: Limited Endurance    Treatment Team Recommendation: Home with Home Health Care  Discharge Destination Plan:: Home with Home Health Care  Transport at Discharge :  Family    Additional Comments: Pt is cleared for d/c by Vascular Surgery LARISA Gregg. RN Argenis Acuna was notified of pt's d/c order. Pt is accepted for services by [facility] for his aftercare plan. The pt and his daughter Whitley Hodges were both informed of d/c. Daughter will transport pt home later this day; pickup time is TBD. IMM signed by pt on 9/3/24. No further CM needs.

## 2024-09-04 NOTE — PROGRESS NOTES
"Progress Note - Vascular Surgery   Thomas Horne 79 y.o. male MRN: [id number]  Unit/Bed#: [facility] 520-01 Encounter: [id number]      Subjective:  The patient reports feeling \"ok.\" He endorses not being in any pain    Vitals:  /77 (BP Location: Left arm)   Pulse 77   Temp 98.8 °F (37.1 °C) (Oral)   Resp 18   Ht 6' 2\" (1.88 m)   Wt 105 kg (231 lb)   SpO2 96%   BMI 29.66 kg/m²     I/Os:  I/O last 3 completed shifts:  In: 1962 [P.O.:1962]  Out: 2700 [Urine:2700]  No intake/output data recorded.    Lab Results and Cultures:   CBC with diff:   Lab Results   Component Value Date    WBC 8.15 09/03/2024    HGB 9.5 (L) 09/03/2024    HCT 30.4 (L) 09/03/2024    MCV 84 09/03/2024     09/03/2024    RBC 3.63 (L) 09/03/2024    MCH 26.2 (L) 09/03/2024    MCHC 31.3 (L) 09/03/2024    RDW 15.6 (H) 09/03/2024    MPV 11.2 09/03/2024    NRBC 0 08/22/2024   ,   BMP/CMP:  Lab Results   Component Value Date    SODIUM 140 09/04/2024    K 3.7 09/04/2024     09/04/2024    CO2 29 09/04/2024    CO2 25 08/30/2024    BUN 19 09/04/2024    CREATININE 0.99 09/04/2024    GLUCOSE 206 (H) 08/30/2024    CALCIUM 8.6 09/04/2024    AST 21 04/17/2024    ALT 16 04/17/2024    ALKPHOS 38 04/17/2024    EGFR 71 09/04/2024   ,   Lipid Panel: No results found for: \"CHOL\",   Coags:   Lab Results   Component Value Date    PTT 29 11/16/2023    INR 1.33 (H) 08/31/2024   ,     Blood Culture:   Lab Results   Component Value Date    BLOODCX No Growth After 5 Days. 06/16/2023    BLOODCX No Growth After 5 Days. 06/16/2023   ,   Urinalysis:   Lab Results   Component Value Date    COLORU Yellow 06/17/2023    CLARITYU Clear 06/17/2023    SPECGRAV 1.010 06/17/2023    PHUR 5.5 06/17/2023    LEUKOCYTESUR (A) 06/17/2023     Elevated glucose may cause decreased leukocyte values. See urine microscopic for UWBC result    NITRITE Negative 06/17/2023    GLUCOSEU >=1000 (1%) (A) 06/17/2023    KETONESU Negative 06/17/2023    BILIRUBINUR Negative 06/17/2023    BLOODU " "Negative 06/17/2023   ,   Urine Culture: No results found for: \"URINECX\",   Wound Culure:   Lab Results   Component Value Date    WOUNDCULT 4+ Growth of Klebsiella pneumoniae (A) 08/08/2024    WOUNDCULT 3+ Growth of Citrobacter koseri (A) 08/08/2024    WOUNDCULT 2+ Growth of Pseudomonas aeruginosa (A) 08/08/2024    WOUNDCULT 2+ Growth of 08/08/2024       Medications:  Current Facility-Administered Medications   Medication Dose Route Frequency    acetaminophen (TYLENOL) tablet 975 mg  975 mg Oral Q8H CONSTANTINE    albuterol CONTINUOUS nebulizing solution (canister) 20 mg  20 mg Nebulization Once    albuterol inhalation solution 2.5 mg  2.5 mg Nebulization Q6H PRN    amLODIPine (NORVASC) tablet 5 mg  5 mg Oral Daily    aspirin (ECOTRIN LOW STRENGTH) EC tablet 81 mg  81 mg Oral Daily    atorvastatin (LIPITOR) tablet 40 mg  40 mg Oral Daily With Dinner    cloNIDine (CATAPRES) tablet 0.1 mg  0.1 mg Oral Q12H    clopidogrel (PLAVIX) tablet 75 mg  75 mg Oral Daily    collagenase (SANTYL) ointment   Topical Daily    DULoxetine (CYMBALTA) delayed release capsule 30 mg  30 mg Oral Daily    fenofibrate (TRICOR) tablet 145 mg  145 mg Oral Daily    heparin (porcine) subcutaneous injection 5,000 Units  5,000 Units Subcutaneous Q8H CONSTANTINE    insulin glargine (LANTUS) subcutaneous injection 15 Units 0.15 mL  15 Units Subcutaneous HS    insulin lispro (HumALOG/ADMELOG) 100 units/mL subcutaneous injection 1-6 Units  1-6 Units Subcutaneous 4x Daily (with meals and at bedtime)    isosorbide mononitrate (IMDUR) 24 hr tablet 90 mg  90 mg Oral Daily    magnesium Oxide (MAG-OX) tablet 400 mg  400 mg Oral BID    metoprolol tartrate (LOPRESSOR) tablet 25 mg  25 mg Oral Q12H [facility]    nitroglycerin (NITROSTAT) SL tablet 0.4 mg  0.4 mg Sublingual Q5 Min PRN    ondansetron (ZOFRAN) injection 4 mg  4 mg Intravenous Q6H PRN    oxyCODONE (ROXICODONE) immediate release tablet 10 mg  10 mg Oral Q6H PRN    oxyCODONE (ROXICODONE) IR tablet 5 mg  5 mg Oral Q6H PRN "    pantoprazole (PROTONIX) EC tablet 40 mg  40 mg Oral Early Morning    pentoxifylline (TRENtal) ER tablet 400 mg  400 mg Oral TID With Meals    pregabalin (LYRICA) capsule 100 mg  100 mg Oral TID    ranolazine (RANEXA) 12 hr tablet 1,000 mg  1,000 mg Oral BID    senna (SENOKOT) tablet 8.6 mg  1 tablet Oral Daily    simethicone (MYLICON) chewable tablet 80 mg  80 mg Oral Q6H PRN    torsemide (DEMADEX) tablet 20 mg  20 mg Oral Daily       Physical Exam:    General: Patient is resting comfortably in bed   Extremities: Non-tender  Neurologic: Alert and oriented x3     Wound/Incision:  Incision is clean and non-tender     Assessment:  79-year-old male s/p left ak-pop to bk-pop bypass with PFTE graft on 8/30 for LLE CLTI. Patient is not in pain, tolerating ambulation with physical therapy, and is prepared for discharge.         Plan:  -Discharge with visiting nurse care  -Contacted podiatry regarding discharge planning and outpatient follow up  -Discharge with ASA/Plavix     Lauro Gunderson MD  9/4/2024

## 2024-09-04 NOTE — PHYSICAL THERAPY NOTE
Physical Therapy Re-Evaluation     Patient's Name: Thomas Horne    Admitting Diagnosis  Peripheral arteriosclerosis (HCC) [I70.209]    Problem List  Patient Active Problem List   Diagnosis    Mixed hyperlipidemia    Primary hypertension    Coronary artery disease involving native coronary artery of native heart without angina pectoris    S/P angioplasty with stent    Hx of CABG    S/P AAA repair    S/P prostatectomy    H/O prostate cancer    Type 2 diabetes mellitus with diabetic polyneuropathy, with long-term current use of insulin (HCC)    Varicose veins of left lower extremity    History of endovascular stent graft for abdominal aortic aneurysm (AAA)    Bruit (arterial)    Peripheral artery disease (HCC)    Type 2 diabetes mellitus with diabetic peripheral angiopathy without gangrene, with long-term current use of insulin (HCC)    Actinic keratoses    Acute kidney injury superimposed on chronic kidney disease  (HCC)    Platelets decreased (HCC)    Gross hematuria    Chronic HFpEF/Moderate pHTN (HFpEF) (HCC)    Mild aortic stenosis    Chronic kidney disease-mineral and bone disorder    Stable angina pectoris    Hypertensive urgency    Elevated troponin level not due myocardial infarction    Diabetic ulcer of right midfoot associated with diabetes mellitus due to underlying condition, limited to breakdown of skin (HCC)    Acute on chronic diastolic heart failure (HCC)    Stage 3b chronic kidney disease (HCC)    Frequent PVCs    Acute respiratory distress    Melena    Symptomatic anemia    Multiple gastric ulcers    Iron deficiency anemia due to chronic blood loss    Duodenal ulcer    Acute blood loss anemia    History of colon polyps    Shortness of breath    Iron deficiency anemia    Acute deep vein thrombosis (DVT) of left peroneal vein (HCC)    Plantar fasciitis, right    Diabetic ulcer of toe of left foot associated with type 2 diabetes mellitus, limited to breakdown of skin (HCC)    History of DVT (deep  vein thrombosis)       Past Medical History  Past Medical History:   Diagnosis Date    Anemia     CAD (coronary artery disease)     Callus     Cancer (HCC)     prostate    CHF (congestive heart failure) (HCC)     Chronic kidney disease     Clotting disorder (HCC)     Coronary artery disease 1988    Deep vein thrombosis (HCC)     Diabetes mellitus (HCC)     Difficulty walking     Duodenal ulcer     Heart disease 1988    Hyperlipidemia     Hypertension     Myocardial infarction (HCC)     Neuropathy     Bilateral feet    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)     Plantar fasciitis     Sleep apnea     Could not tolerate CPAP       Past Surgical History  Past Surgical History:   Procedure Laterality Date    ABDOMINAL AORTIC ANEURYSM REPAIR      Stented    ADENOIDECTOMY      BACK SURGERY  1985    CARDIAC CATHETERIZATION Left 10/19/2022    Procedure: Cardiac Left Heart Cath;  Surgeon: Danielle Pereira MD;  [facility]: [facility];  Service: Cardiology    CARDIAC SURGERY  2002    3 cardiac bypass then angioplasty 7/2020    CHOLECYSTECTOMY      COLONOSCOPY  02/14/2024    CORONARY ARTERY BYPASS GRAFT      IR LOWER EXTREMITY ANGIOGRAM  11/01/2023    IR LOWER EXTREMITY ANGIOGRAM  08/07/2024    LAMINECTOMY  1990    NM BYPASS W/VEIN FEMORAL-POPLITEAL Right 11/16/2023    Procedure: BYPASS FEMORAL-POPLITEAL WITH CRYO VEIN, RIGHT FEMORAL ENDARTERECTOMY;  Surgeon: Vasquez Clark MD;  Location: [facility];  Service: Vascular    NM BYPASS W/VEIN FEMORAL-POPLITEAL Left 8/30/2024    Procedure: left lower extremity above knee popliteal to below knee popliteal artery bypass with PTFE graft;  Surgeon: Vasquez Clark MD;  Location: [facility];  Service: Vascular    NM SLCTV CATHJ 3RD+ ORD SLCTV ABDL PEL/LXTR BRNCH Right 11/01/2023    Procedure: ARTERIOGRAM Right lower extremity arteriogram with CO2 via right groin access;  Surgeon: Vasquez Clark MD;  Location: [facility];  Service: Vascular    NM SLCTV  "CATHJ 3RD+ ORD SLCTV ABDL PEL/LXTR BRNCH Left 08/07/2024    Procedure: diagnostic LLE Arteriogram;  Surgeon: Vasquez Clark MD;  Location: [facility];  Service: Vascular    PROSTATE SURGERY      TONSILLECTOMY      URINARY SPHINCTER IMPLANT            09/04/24 1101   PT Last Visit   PT Visit Date 09/04/24   Note Type   Note type Re-Evaluation   Pain Assessment   Pain Assessment Tool 0-10   Pain Score No Pain   Restrictions/Precautions   Weight Bearing Precautions Per Order (S)  Yes   LLE Weight Bearing Per Order (S)  WBAT  (in heel offloading shoe - short distances only)   Braces or Orthoses (S)  Other (Comment)  (heel offloading shoe LLE -- per podiatry \"Weightbearing as tolerated left  foot in global Pedi (heel offloading) shoe for short distance. NWB with a knee scooter outdoors and long distance\")   Other Precautions Fall Risk;Telemetry;Multiple lines;Pain   Home Living   Additional Comments please see IE for home set up   Prior Function   Comments please see IE for PLOF   General   Family/Caregiver Present No   Cognition   Overall Cognitive Status WFL   Arousal/Participation Alert   Attention Within functional limits   Orientation Level Oriented X4   Memory Within functional limits   Following Commands Follows all commands and directions without difficulty   Comments Patient pleasant and cooperative   Subjective   Subjective Patient agreeable to PT eval   RUE Assessment   RUE Assessment WFL   LUE Assessment   LUE Assessment WFL   RLE Assessment   RLE Assessment X   Strength RLE   RLE Overall Strength 4-/5   LLE Assessment   LLE Assessment X   Strength LLE   LLE Overall Strength 4-/5   Bed Mobility   Additional Comments OOB in chair on arrival   Transfers   Sit to Stand 5  Supervision   Additional items Increased time required   Stand to Sit 5  Supervision   Additional items Increased time required   Additional Comments RW + heel offloading shoe   Ambulation/Elevation   Gait pattern Decreased foot " clearance;Excessively slow;Inconsistent ira;Foward flexed   Gait Assistance 5  Supervision  (Sup-CGA)   Additional items Verbal cues   Assistive Device Rolling walker   Distance 15'x2   Ambulation/Elevation Additional Comments assist needed to don heel offloading shoe   Balance   Static Sitting Good   Dynamic Sitting Fair +   Static Standing Fair   Dynamic Standing Fair -   Ambulatory Fair -   Endurance Deficit   Endurance Deficit Yes   Endurance Deficit Description fatigue, weakness   Activity Tolerance   Activity Tolerance Patient limited by fatigue   Nurse Made Aware RN cleared   Assessment   Prognosis Good   Problem List Decreased strength;Decreased endurance;Impaired balance;Decreased mobility;Impaired sensation;Decreased skin integrity;Pain   Assessment Pt is a 79 y.o. male seen for a PT re-evaluation due to Ongoing medical management for primary dx, Increased reliance on more restrictive AD compared to baseline, Decreased activity tolerance compared to baseline, Fall risk, Increased assistance needed from caregiver at current time, Ongoing telemetry monitoring, Current WBS. Patient was originally admitted to [facility] on 8/30/2024 for Peripheral arteriosclerosis (HCC) (I70.209). Patient is now WBAT to LLE in heel offloading shoe for short distance ambulation only. Patient should be NWB for long distance ambulation. 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     PT re-consulted to assess functional mobility and needs for safe d/c planning. Please see IE for PLOF and home set up.     Currently  pt requires supervision for functional transfers with rolling walker ; supervision -CGA for short distance ambulation with rolling walker. Pt functioning below baseline and w/ overall mobility deficits 2* to: decreased strength, decreased endurance, decreased mobility, impaired balance. These impairments place pt at risk for falls.     Pt will continue to benefit from skilled PT interventions to address stated impairments; to maximize functional potential; for ongoing pt/family education; and DME needs. The patient's [facility] Basic Mobility Inpatient Short Form Raw Score Is 20. PT is currently recommending Level 3 - Minimum Resource Intensity on d/c from hospital. Will continue to follow as able.   Goals   Patient Goals to go home   STG Expiration Date 09/18/24   Short Term Goal #1 Goals updated -- In 14 days, patient will 1) increase strength in BUE/BLE by 1/2 to 1 full grade for increased strength and stability needed for functional mobility 2) improve bed mobility to MI for improved mobility and decreased need for assist 3) sit EOB x30' with MI to facilitate trunk stability and safety for completion of ADL tasks 4) increase functional transfers to MI for improved safety and functional mobility 5) ambulate 20ft with MI using LRAD for increased endurance and safety ambulating home 6) improve balance by 1 grade for improved safety and stability and decreased risk for falls. 7) ascend/descend a full flight of stairs using HR with MI in order to safely access home environment   PT Treatment Day 0   Plan   Treatment/Interventions ADL retraining;Functional transfer training;LE strengthening/ROM;Therapeutic exercise;Endurance training;Patient/family training;Equipment eval/education;Bed mobility;Gait training;Spoke to nursing;Spoke to case management;OT;Elevations   PT Frequency 3-5x/wk   Discharge Recommendation   Rehab Resource Intensity Level, PT III (Minimum Resource Intensity)   AM-PAC Basic Mobility Inpatient    Turning in Flat Bed Without Bedrails 4   Lying on Back to Sitting on Edge of Flat Bed Without Bedrails 4   Moving Bed to Chair 3   Standing Up From Chair Using Arms 3   Walk in Room 3   Climb 3-5 Stairs With Railing 3   Basic Mobility Inpatient Raw Score 20   Basic Mobility Standardized Score 43.99   [facility] Highest Level Of Mobility   -HLM Goal 6: Walk 10 steps or more   JH-HLM Achieved 6: Walk 10 steps or more   Modified [place] Scale   Modified [place] Scale 4   Additional Treatment Session   Start Time 1051   End Time 1101   Treatment Assessment Additional tx session used for education and mobility using heel offloading shoe. Patient educated on proper donning/doffing of shoe, WBS, and proper gait pattern while ambulating. Patient educated that per podiatry, the patient should only be ambulating short distances at this time in offloading shoe. Wheelchair vs knee scooter for long distance ambulation to promote NWBing. Patient states that he plans on staying home initally and declines need for long distance ambulation. Patient educated to continue working with home therapy to improve strength and endurance. Patient in agreement with plan and recommendations.   Equipment Use RW   End of Consult   Patient Position at End of Consult All needs within reach;Bedside chair         Anya Dexter, PT, DPT

## 2024-09-04 NOTE — CONSULTS
Thomas is an 79-year-old male who recently underwent lower extremity revascularization on the left side.  Approximately 10 years ago he underwent a robot-assisted laparoscopic prostatectomy for prostate cancer performed at [facility].  Shortly thereafter he had insertion of an artificial urinary sphincter.  More recently he underwent surgery here at [facility] for lower extremity revascularization.  The AUS was deactivated by Dr. Pereira intraoperatively and a 12 [place] Dyer catheter was then placed.  The Dyer catheter has been removed since that time and reactivated.  The staff just wanted to ensure that the AUS is functioning properly.  Upon questioning to the patient he states that he is cycling the AUS and voiding in the standard fashion.  Upon my arrival on physical examination the pump was inspected and the button was noted to be in the activated position.  I then cycled the device, however, there was very little urine which drained as the patient states that he had cycled it 30 minutes prior.  PVR just over 200 cc measured approximately 24 hours after my initial examination.  At this time it appears that the AUS is functioning properly.  No additional urologic recommendations at this time.  The patient can continue to cycle the AUS when he needs to void.  Outpatient follow-up with urology as recommended.

## 2024-09-04 NOTE — DISCHARGE SUMMARY
Discharge Summary - Thomas Horne 79 y.o. male MRN: [id number]    Unit/Bed#: [facility] 520-01 Encounter: [id number]    Admission Date: 8/30/24      Admitting Diagnosis: Peripheral arteriosclerosis (HCC) [I70.209]    HPI: 79 y.o. male former smoker, with PMH HTN, HLD, CAD s/p CABG, AAA s/p EVAR, type II DM, CHF, pulmonary HTN, CKD 3, prostate CA s/p prostatectomy with urinary sphincter placement, provoked LLE DVT 04/2024, and PAD.      Procedures Performed: No orders of the defined types were placed in this encounter.      Summary of Hospital Course:   Thomas Horne is an 79 year old male that presented to [facility] on 8/30/24 for elective left distal superficial femoral artery bypass to below knee popliteal artery bypass with PTFE with . He tolerated the procedure well. He was seen by the vascular surgery team and found to be neurologically intact. POD 1 pt found to have mild respiratory distress while getting out of bed to the chair and was treated successfully with IV Bumex.  Urology consulted for hx of robotic prostatectomy and hx of catheter placement and activated pt urethral sphincter, urology plans for follow up on outpatient basis. Nephrology was consulted for IVY prevention protocol and found him to be medically stable. Podiatry was consulted and plans for local wound care and outpatient follow up. Podiatry recommending weightbearing with heel offloading shoe, elevation and offloading on green foam wedges. He denies any pain and has not utilized any opioid pain mediation since 9/1 and has utilized over the counter pain medication for pain management. He is tolerating PO diet without difficulty. His incision is clean, dry with staples in place. He was seen and evaluated by physical and occupational therapy and deemed safe for discharge to home. He was instructed to continue with daily aspirin 81 mg, Plavix and Atorvastatin 40 mg. He was instructed to take acetaminophen as needed for pain as  instructed on the bottle. All questions and concerns were addressed. He has post-op appointment scheduled for 10/3/24 with .       Condition at Discharge: good         Discharge instructions/Information to patient and family:   See after visit summary for information provided to patient and family.      Provisions for Follow-Up Care:  See after visit summary for information related to follow-up care and any pertinent home health orders.      PCP: Frank Lombardi, DO    Disposition: Home    Planned Readmission: No      Discharge Statement   I spent 25 minutes discharging the patient. This time was spent on the day of discharge. I had direct contact with the patient on the day of discharge. Additional documentation is required if more than 30 minutes were spent on discharge.     Discharge Medications:  See after visit summary for reconciled discharge medications provided to patient and family.

## 2024-09-04 NOTE — PROGRESS NOTES
Podiatry - Progress Note  Patient: Thomas Horne 79 y.o. male   MRN: [id number]  PCP: Frank Lombardi, DO  Unit/Bed#: [facility] 520-01 Encounter: [id number]  Date Of Visit: 09/04/24    ASSESSMENT:    Thomas Horne is a 79 y.o. male with:    Peripheral artery disease  -s/p distal superficial femoral artery to below knee popliteal artery bypass DOS: 8/30/24  Type 2 diabetes with diabetic polyneuropathy.  (Last A1c 7.0% 4/17/2024)  Type 2 diabetes with diabetic foot ulcer  History of DVT      PLAN:    Patient was seen and evaluated at bedside.  Eschar's to the left foot are stable with no acute clinical signs of infection.  Santyl applied at bedside.  Will plan for local wound care consisting of Santyl DSD to the left foot heel wound and 5th MT base wound after debridement and cross-olivo. The left 2nd toe distal wound is not cross-hatched or applied with Santyl because the patient is weight bearing more to front foot. The distal 2nd toe wound is dressed with betadine DSD. With outpatient follow-up at the [place] wound [facility] with Dr. Sundar Arango.  Santyl DSD appreciated suggestive drainage.  Weightbearing tolerated heel offloading shoe. Patient reports received the training from the PT and capable for regular ambulation and climbing stairs.   Follow-up information placed. Patient is stable to be discharged from podiatry standpoint.   Elevation and offloading on green foam wedges or pillows when non-ambulatory.  Rest of care per primary team.     Weightbearing status: Weightbearing as tolerated left  foot in global Pedi (heel offloadin) shoe for short distance. NWB with a knee scooter outdoors and long distance.    Debridement Procedure:    After  Verbal Consent was obtained and time out performed. Wound located at left plantar heel and 5th MT base was debrided using scapel. Pre-debridement left heel wound measures 4 cm x 2 cm x 0.2 cm.  Post debridement left heel wound measurement 4.1 cm x 2.1 cm x 0.2 cm for a total of  "2 square centimeters. Pre-debridement left 5th MT base wound measures 1.5 cm x 1.5 cm x 0.2 cm.  Post debridement left heel wound measurement 1.6 cm x 1.6 cm x 0.2 cm for a total of 1 square centimeters Patient reports no pain.  The periwound hyperkeratotic tissue was removed to the depth of normal skin. The eschar on the wound bed was cross-hatched with a 15 blade without penetrating the eschar. No bleeding or drainage was noted. Patient tolerated the mechanical debridement well. Santyl collagenase ointment was applied to the wound bed avoiding the periwound skin. DSD was further applied.     SUBJECTIVE:     The patient was seen, evaluated, and assessed at bedside today. The patient was awake, alert, and in no acute distress. No acute events overnight. The patient reports he understands that he is to offload his left foot is much as possible given the gangrene.  He states that he has a knee scooter available to him as well as) issue.. Patient denies N/V/F/chills/SOB/CP.      OBJECTIVE:     Vitals:   BP (!) 175/79   Pulse 68   Temp 98.4 °F (36.9 °C) (Oral)   Resp 18   Ht 6' 2\" (1.88 m)   Wt 105 kg (231 lb)   SpO2 95%   BMI 29.66 kg/m²     Temp (24hrs), Av.2 °F (36.8 °C), Min:97.6 °F (36.4 °C), Max:98.8 °F (37.1 °C)      Physical Exam:     Lungs: Non labored breathing  Abdomen: Soft, non-tender.  Lower Extremity:  Cardiovascular status at baseline from admission.  Neurological status at baseline from admission.  Musculoskeletal status at baseline from admission. No calf tenderness noted.     Wound #: 1  Location: left plantar heel  Length 4.1 cm: Width 2.1cm: Depth 0.2 cm:   Deepest Tissue Noted in Base: eschar  Probe to Bone: No  Peripheral Skin Description: Attached  Granulation: 0% Fibrotic Tissue: 0% Necrotic Tissue: 100%   Drainage Amount: minimal  Signs of Infection: No    Wound #: 2  Location: left fifth metatarsal base  Length 1.6cm: Width 1.6cm: Depth 0.1 cm:   Deepest Tissue Noted in Base: " "eschar  Probe to Bone: No  Peripheral Skin Description: Attached  Granulation: 0% Fibrotic Tissue: 0% Necrotic Tissue: 100%   Drainage Amount: minimal  Signs of Infection: No    Wound #: 3  Location: left distal second toe  Length 1cm: Width 0.7cm: Depth 0.2cm:   Deepest Tissue Noted in Base: eschar  Probe to Bone: No  Peripheral Skin Description: Attached  Granulation: 0% Fibrotic Tissue: 0% Necrotic Tissue: 100%   Drainage Amount: minimal, serous  Signs of Infection: No    Clinical Images 09/04/24:  Before debridement     Before debridement        After debridement    After debridement         Additional Data:     Labs:    Results from last 7 days   Lab Units 09/03/24  0351   WBC Thousand/uL 8.15   HEMOGLOBIN g/dL 9.5*   HEMATOCRIT % 30.4*   PLATELETS Thousands/uL 259     Results from last 7 days   Lab Units 09/04/24  0541 08/31/24  0455 08/30/24  1121   POTASSIUM mmol/L 3.7   < >  --    CHLORIDE mmol/L 103   < >  --    CO2 mmol/L 29   < >  --    CO2, I-STAT mmol/L  --   --  25   BUN mg/dL 19   < >  --    CREATININE mg/dL 0.99   < >  --    CALCIUM mg/dL 8.6   < >  --    GLUCOSE, ISTAT mg/dl  --   --  206*    < > = values in this interval not displayed.     Results from last 7 days   Lab Units 08/31/24  0455   INR  1.33*       * I Have Reviewed All Lab Data Listed Above.    Recent Cultures (last 7 days):               Imaging: I have personally reviewed pertinent films in PACS  EKG, Pathology, and Other Studies: I have personally reviewed pertinent reports.    ** Please Note: Portions of the record may have been created with voice recognition software. Occasional wrong word or \"sound a like\" substitutions may have occurred due to the inherent limitations of voice recognition software. Read the chart carefully and recognize, using context, where substitutions have occurred. **      "

## 2024-09-05 ENCOUNTER — TELEPHONE (OUTPATIENT)
Dept: FAMILY MEDICINE CLINIC | Facility: CLINIC | Age: 79
End: 2024-09-05

## 2024-09-05 ENCOUNTER — TRANSITIONAL CARE MANAGEMENT (OUTPATIENT)
Dept: FAMILY MEDICINE CLINIC | Facility: CLINIC | Age: 79
End: 2024-09-05

## 2024-09-05 DIAGNOSIS — Z71.89 COMPLEX CARE COORDINATION: Primary | ICD-10-CM

## 2024-09-05 NOTE — TELEPHONE ENCOUNTER
I spoke with Thomas's daughter Whitley on 9/5/24. I scheduled an appt for him but he is not awake yet and she wants to call me back later to discuss discharge instructions and confirm appt that I made Imani

## 2024-09-06 ENCOUNTER — TELEPHONE (OUTPATIENT)
Age: 79
End: 2024-09-06

## 2024-09-06 ENCOUNTER — TELEPHONE (OUTPATIENT)
Dept: FAMILY MEDICINE CLINIC | Facility: CLINIC | Age: 79
End: 2024-09-06

## 2024-09-06 ENCOUNTER — APPOINTMENT (EMERGENCY)
Dept: RADIOLOGY | Facility: HOSPITAL | Age: 79
End: 2024-09-06
Payer: COMMERCIAL

## 2024-09-06 ENCOUNTER — HOSPITAL ENCOUNTER (OUTPATIENT)
Facility: HOSPITAL | Age: 79
Setting detail: OBSERVATION
Discharge: HOME HEALTH | End: 2024-09-07
Attending: EMERGENCY MEDICINE
Payer: COMMERCIAL

## 2024-09-06 ENCOUNTER — NURSE TRIAGE (OUTPATIENT)
Age: 79
End: 2024-09-06

## 2024-09-06 ENCOUNTER — PATIENT OUTREACH (OUTPATIENT)
Dept: CASE MANAGEMENT | Facility: HOSPITAL | Age: 79
End: 2024-09-06

## 2024-09-06 VITALS
TEMPERATURE: 98.3 F | HEIGHT: 74 IN | WEIGHT: 231 LBS | DIASTOLIC BLOOD PRESSURE: 89 MMHG | BODY MASS INDEX: 29.65 KG/M2 | SYSTOLIC BLOOD PRESSURE: 111 MMHG | OXYGEN SATURATION: 94 % | RESPIRATION RATE: 17 BRPM | HEART RATE: 58 BPM

## 2024-09-06 DIAGNOSIS — R11.0 NAUSEA: ICD-10-CM

## 2024-09-06 DIAGNOSIS — R79.89 ELEVATED TROPONIN: ICD-10-CM

## 2024-09-06 DIAGNOSIS — E11.51 TYPE 2 DIABETES MELLITUS WITH DIABETIC PERIPHERAL ANGIOPATHY WITHOUT GANGRENE, WITH LONG-TERM CURRENT USE OF INSULIN (HCC): ICD-10-CM

## 2024-09-06 DIAGNOSIS — L97.509 FOOT ULCER (HCC): ICD-10-CM

## 2024-09-06 DIAGNOSIS — R06.00 DYSPNEA: Primary | ICD-10-CM

## 2024-09-06 DIAGNOSIS — E11.51 TYPE 2 DIABETES MELLITUS WITH DIABETIC PERIPHERAL ANGIOPATHY WITHOUT GANGRENE, WITH LONG-TERM CURRENT USE OF INSULIN (HCC): Primary | ICD-10-CM

## 2024-09-06 DIAGNOSIS — Z79.4 TYPE 2 DIABETES MELLITUS WITH DIABETIC PERIPHERAL ANGIOPATHY WITHOUT GANGRENE, WITH LONG-TERM CURRENT USE OF INSULIN (HCC): ICD-10-CM

## 2024-09-06 DIAGNOSIS — Z79.4 TYPE 2 DIABETES MELLITUS WITH DIABETIC PERIPHERAL ANGIOPATHY WITHOUT GANGRENE, WITH LONG-TERM CURRENT USE OF INSULIN (HCC): Primary | ICD-10-CM

## 2024-09-06 PROBLEM — R06.02 SOB (SHORTNESS OF BREATH): Status: ACTIVE | Noted: 2024-09-06

## 2024-09-06 LAB
2HR DELTA HS TROPONIN: -18 NG/L
4HR DELTA HS TROPONIN: -5 NG/L
ALBUMIN SERPL BCG-MCNC: 3.3 G/DL (ref 3.5–5)
ALP SERPL-CCNC: 37 U/L (ref 34–104)
ALT SERPL W P-5'-P-CCNC: 18 U/L (ref 7–52)
ANION GAP SERPL CALCULATED.3IONS-SCNC: 8 MMOL/L (ref 4–13)
APTT PPP: 30 SECONDS (ref 23–34)
AST SERPL W P-5'-P-CCNC: 23 U/L (ref 13–39)
BASOPHILS # BLD AUTO: 0.03 THOUSANDS/ÂΜL (ref 0–0.1)
BASOPHILS NFR BLD AUTO: 0 % (ref 0–1)
BILIRUB SERPL-MCNC: 0.69 MG/DL (ref 0.2–1)
BILIRUB UR QL STRIP: NEGATIVE
BNP SERPL-MCNC: 1067 PG/ML (ref 0–100)
BUN SERPL-MCNC: 18 MG/DL (ref 5–25)
CALCIUM ALBUM COR SERPL-MCNC: 9.7 MG/DL (ref 8.3–10.1)
CALCIUM SERPL-MCNC: 9.1 MG/DL (ref 8.4–10.2)
CARDIAC TROPONIN I PNL SERPL HS: 103 NG/L
CARDIAC TROPONIN I PNL SERPL HS: 85 NG/L
CARDIAC TROPONIN I PNL SERPL HS: 98 NG/L
CHLORIDE SERPL-SCNC: 103 MMOL/L (ref 96–108)
CLARITY UR: CLEAR
CO2 SERPL-SCNC: 26 MMOL/L (ref 21–32)
COLOR UR: YELLOW
CREAT SERPL-MCNC: 1.07 MG/DL (ref 0.6–1.3)
D DIMER PPP FEU-MCNC: 2.02 UG/ML FEU
EOSINOPHIL # BLD AUTO: 0.24 THOUSAND/ÂΜL (ref 0–0.61)
EOSINOPHIL NFR BLD AUTO: 2 % (ref 0–6)
ERYTHROCYTE [DISTWIDTH] IN BLOOD BY AUTOMATED COUNT: 15.9 % (ref 11.6–15.1)
FLUAV RNA RESP QL NAA+PROBE: NEGATIVE
FLUBV RNA RESP QL NAA+PROBE: NEGATIVE
GFR SERPL CREATININE-BSD FRML MDRD: 64 ML/MIN/1.73SQ M
GLUCOSE SERPL-MCNC: 132 MG/DL (ref 65–140)
GLUCOSE SERPL-MCNC: 79 MG/DL (ref 65–140)
GLUCOSE SERPL-MCNC: 90 MG/DL (ref 65–140)
GLUCOSE UR STRIP-MCNC: ABNORMAL MG/DL
HCT VFR BLD AUTO: 34 % (ref 36.5–49.3)
HGB BLD-MCNC: 10.8 G/DL (ref 12–17)
HGB UR QL STRIP.AUTO: NEGATIVE
IMM GRANULOCYTES # BLD AUTO: 0.12 THOUSAND/UL (ref 0–0.2)
IMM GRANULOCYTES NFR BLD AUTO: 1 % (ref 0–2)
INR PPP: 1.13 (ref 0.85–1.19)
KETONES UR STRIP-MCNC: NEGATIVE MG/DL
LEUKOCYTE ESTERASE UR QL STRIP: NEGATIVE
LIPASE SERPL-CCNC: 34 U/L (ref 11–82)
LYMPHOCYTES # BLD AUTO: 1.09 THOUSANDS/ÂΜL (ref 0.6–4.47)
LYMPHOCYTES NFR BLD AUTO: 9 % (ref 14–44)
MCH RBC QN AUTO: 26.2 PG (ref 26.8–34.3)
MCHC RBC AUTO-ENTMCNC: 31.8 G/DL (ref 31.4–37.4)
MCV RBC AUTO: 83 FL (ref 82–98)
MONOCYTES # BLD AUTO: 0.67 THOUSAND/ÂΜL (ref 0.17–1.22)
MONOCYTES NFR BLD AUTO: 6 % (ref 4–12)
NEUTROPHILS # BLD AUTO: 9.56 THOUSANDS/ÂΜL (ref 1.85–7.62)
NEUTS SEG NFR BLD AUTO: 82 % (ref 43–75)
NITRITE UR QL STRIP: NEGATIVE
NRBC BLD AUTO-RTO: 0 /100 WBCS
PH UR STRIP.AUTO: 5.5 [PH]
PLATELET # BLD AUTO: 414 THOUSANDS/UL (ref 149–390)
PMV BLD AUTO: 10.1 FL (ref 8.9–12.7)
POTASSIUM SERPL-SCNC: 4 MMOL/L (ref 3.5–5.3)
PROCALCITONIN SERPL-MCNC: 0.09 NG/ML
PROT SERPL-MCNC: 6.8 G/DL (ref 6.4–8.4)
PROT UR STRIP-MCNC: NEGATIVE MG/DL
PROTHROMBIN TIME: 15.1 SECONDS (ref 12.3–15)
RBC # BLD AUTO: 4.12 MILLION/UL (ref 3.88–5.62)
RSV RNA RESP QL NAA+PROBE: NEGATIVE
SARS-COV-2 RNA RESP QL NAA+PROBE: NEGATIVE
SODIUM SERPL-SCNC: 137 MMOL/L (ref 135–147)
SP GR UR STRIP.AUTO: <1.005 (ref 1–1.03)
UROBILINOGEN UR STRIP-ACNC: <2 MG/DL
WBC # BLD AUTO: 11.71 THOUSAND/UL (ref 4.31–10.16)

## 2024-09-06 PROCEDURE — 83690 ASSAY OF LIPASE: CPT | Performed by: EMERGENCY MEDICINE

## 2024-09-06 PROCEDURE — 71275 CT ANGIOGRAPHY CHEST: CPT

## 2024-09-06 PROCEDURE — 36415 COLL VENOUS BLD VENIPUNCTURE: CPT | Performed by: EMERGENCY MEDICINE

## 2024-09-06 PROCEDURE — 93005 ELECTROCARDIOGRAM TRACING: CPT

## 2024-09-06 PROCEDURE — 84145 PROCALCITONIN (PCT): CPT | Performed by: INTERNAL MEDICINE

## 2024-09-06 PROCEDURE — 96374 THER/PROPH/DIAG INJ IV PUSH: CPT

## 2024-09-06 PROCEDURE — 84484 ASSAY OF TROPONIN QUANT: CPT | Performed by: INTERNAL MEDICINE

## 2024-09-06 PROCEDURE — 80053 COMPREHEN METABOLIC PANEL: CPT | Performed by: EMERGENCY MEDICINE

## 2024-09-06 PROCEDURE — 99285 EMERGENCY DEPT VISIT HI MDM: CPT

## 2024-09-06 PROCEDURE — 83880 ASSAY OF NATRIURETIC PEPTIDE: CPT | Performed by: EMERGENCY MEDICINE

## 2024-09-06 PROCEDURE — 85379 FIBRIN DEGRADATION QUANT: CPT | Performed by: EMERGENCY MEDICINE

## 2024-09-06 PROCEDURE — 0241U HB NFCT DS VIR RESP RNA 4 TRGT: CPT | Performed by: EMERGENCY MEDICINE

## 2024-09-06 PROCEDURE — 99285 EMERGENCY DEPT VISIT HI MDM: CPT | Performed by: EMERGENCY MEDICINE

## 2024-09-06 PROCEDURE — 85025 COMPLETE CBC W/AUTO DIFF WBC: CPT | Performed by: EMERGENCY MEDICINE

## 2024-09-06 PROCEDURE — 85610 PROTHROMBIN TIME: CPT | Performed by: EMERGENCY MEDICINE

## 2024-09-06 PROCEDURE — 84484 ASSAY OF TROPONIN QUANT: CPT | Performed by: EMERGENCY MEDICINE

## 2024-09-06 PROCEDURE — 71045 X-RAY EXAM CHEST 1 VIEW: CPT

## 2024-09-06 PROCEDURE — 82948 REAGENT STRIP/BLOOD GLUCOSE: CPT

## 2024-09-06 PROCEDURE — 74177 CT ABD & PELVIS W/CONTRAST: CPT

## 2024-09-06 PROCEDURE — 96361 HYDRATE IV INFUSION ADD-ON: CPT

## 2024-09-06 PROCEDURE — 99222 1ST HOSP IP/OBS MODERATE 55: CPT | Performed by: INTERNAL MEDICINE

## 2024-09-06 PROCEDURE — 85730 THROMBOPLASTIN TIME PARTIAL: CPT | Performed by: EMERGENCY MEDICINE

## 2024-09-06 RX ORDER — RANOLAZINE 500 MG/1
1000 TABLET, EXTENDED RELEASE ORAL 2 TIMES DAILY
Status: DISCONTINUED | OUTPATIENT
Start: 2024-09-06 | End: 2024-09-07 | Stop reason: HOSPADM

## 2024-09-06 RX ORDER — PANTOPRAZOLE SODIUM 40 MG/1
40 TABLET, DELAYED RELEASE ORAL DAILY
Status: DISCONTINUED | OUTPATIENT
Start: 2024-09-07 | End: 2024-09-06

## 2024-09-06 RX ORDER — PENTOXIFYLLINE 400 MG/1
400 TABLET, EXTENDED RELEASE ORAL
Status: DISCONTINUED | OUTPATIENT
Start: 2024-09-06 | End: 2024-09-07 | Stop reason: HOSPADM

## 2024-09-06 RX ORDER — CEFTRIAXONE 1 G/50ML
1000 INJECTION, SOLUTION INTRAVENOUS EVERY 24 HOURS
Status: DISCONTINUED | OUTPATIENT
Start: 2024-09-06 | End: 2024-09-07

## 2024-09-06 RX ORDER — ASPIRIN 81 MG/1
81 TABLET, CHEWABLE ORAL DAILY
Status: DISCONTINUED | OUTPATIENT
Start: 2024-09-07 | End: 2024-09-07 | Stop reason: HOSPADM

## 2024-09-06 RX ORDER — AMLODIPINE BESYLATE 5 MG/1
5 TABLET ORAL DAILY
Status: DISCONTINUED | OUTPATIENT
Start: 2024-09-07 | End: 2024-09-07 | Stop reason: HOSPADM

## 2024-09-06 RX ORDER — CLONIDINE HYDROCHLORIDE 0.1 MG/1
0.1 TABLET ORAL EVERY 12 HOURS
Status: DISCONTINUED | OUTPATIENT
Start: 2024-09-06 | End: 2024-09-07 | Stop reason: HOSPADM

## 2024-09-06 RX ORDER — ENOXAPARIN SODIUM 100 MG/ML
40 INJECTION SUBCUTANEOUS DAILY
Status: DISCONTINUED | OUTPATIENT
Start: 2024-09-07 | End: 2024-09-07 | Stop reason: HOSPADM

## 2024-09-06 RX ORDER — FUROSEMIDE 10 MG/ML
40 INJECTION INTRAMUSCULAR; INTRAVENOUS ONCE
Status: COMPLETED | OUTPATIENT
Start: 2024-09-06 | End: 2024-09-06

## 2024-09-06 RX ORDER — FENOFIBRATE 145 MG/1
145 TABLET, COATED ORAL DAILY
Status: DISCONTINUED | OUTPATIENT
Start: 2024-09-07 | End: 2024-09-07 | Stop reason: HOSPADM

## 2024-09-06 RX ORDER — DULOXETIN HYDROCHLORIDE 30 MG/1
30 CAPSULE, DELAYED RELEASE ORAL DAILY
Status: DISCONTINUED | OUTPATIENT
Start: 2024-09-07 | End: 2024-09-07 | Stop reason: HOSPADM

## 2024-09-06 RX ORDER — ONDANSETRON 2 MG/ML
4 INJECTION INTRAMUSCULAR; INTRAVENOUS ONCE
Status: COMPLETED | OUTPATIENT
Start: 2024-09-06 | End: 2024-09-06

## 2024-09-06 RX ORDER — INSULIN LISPRO 100 [IU]/ML
1-6 INJECTION, SOLUTION INTRAVENOUS; SUBCUTANEOUS
Status: DISCONTINUED | OUTPATIENT
Start: 2024-09-06 | End: 2024-09-07 | Stop reason: HOSPADM

## 2024-09-06 RX ORDER — ATORVASTATIN CALCIUM 40 MG/1
40 TABLET, FILM COATED ORAL DAILY
Status: DISCONTINUED | OUTPATIENT
Start: 2024-09-07 | End: 2024-09-07 | Stop reason: HOSPADM

## 2024-09-06 RX ORDER — ONDANSETRON 8 MG/1
8 TABLET, FILM COATED ORAL EVERY 8 HOURS PRN
Qty: 20 TABLET | Refills: 0 | Status: ON HOLD | OUTPATIENT
Start: 2024-09-06 | End: 2024-09-07

## 2024-09-06 RX ORDER — PANTOPRAZOLE SODIUM 40 MG/10ML
40 INJECTION, POWDER, LYOPHILIZED, FOR SOLUTION INTRAVENOUS EVERY 12 HOURS SCHEDULED
Status: DISCONTINUED | OUTPATIENT
Start: 2024-09-06 | End: 2024-09-07 | Stop reason: HOSPADM

## 2024-09-06 RX ORDER — MAGNESIUM SULFATE HEPTAHYDRATE 40 MG/ML
2 INJECTION, SOLUTION INTRAVENOUS ONCE
Status: COMPLETED | OUTPATIENT
Start: 2024-09-06 | End: 2024-09-06

## 2024-09-06 RX ORDER — PREGABALIN 100 MG/1
100 CAPSULE ORAL 3 TIMES DAILY
Status: DISCONTINUED | OUTPATIENT
Start: 2024-09-06 | End: 2024-09-07 | Stop reason: HOSPADM

## 2024-09-06 RX ORDER — TORSEMIDE 20 MG/1
20 TABLET ORAL DAILY
Status: DISCONTINUED | OUTPATIENT
Start: 2024-09-07 | End: 2024-09-06

## 2024-09-06 RX ORDER — CLOPIDOGREL BISULFATE 75 MG/1
75 TABLET ORAL DAILY
Status: DISCONTINUED | OUTPATIENT
Start: 2024-09-07 | End: 2024-09-07 | Stop reason: HOSPADM

## 2024-09-06 RX ORDER — INSULIN GLARGINE 100 [IU]/ML
15 INJECTION, SOLUTION SUBCUTANEOUS
Status: DISCONTINUED | OUTPATIENT
Start: 2024-09-06 | End: 2024-09-07 | Stop reason: HOSPADM

## 2024-09-06 RX ORDER — METOPROLOL TARTRATE 25 MG/1
25 TABLET, FILM COATED ORAL EVERY 12 HOURS SCHEDULED
Status: DISCONTINUED | OUTPATIENT
Start: 2024-09-06 | End: 2024-09-07 | Stop reason: HOSPADM

## 2024-09-06 RX ORDER — ONDANSETRON 2 MG/ML
4 INJECTION INTRAMUSCULAR; INTRAVENOUS EVERY 6 HOURS PRN
Status: DISCONTINUED | OUTPATIENT
Start: 2024-09-06 | End: 2024-09-07 | Stop reason: HOSPADM

## 2024-09-06 RX ORDER — ASPIRIN 81 MG/1
324 TABLET, CHEWABLE ORAL ONCE
Status: COMPLETED | OUTPATIENT
Start: 2024-09-06 | End: 2024-09-06

## 2024-09-06 RX ORDER — ACETAMINOPHEN 325 MG/1
650 TABLET ORAL EVERY 6 HOURS PRN
Status: DISCONTINUED | OUTPATIENT
Start: 2024-09-06 | End: 2024-09-07 | Stop reason: HOSPADM

## 2024-09-06 RX ADMIN — ONDANSETRON 4 MG: 2 INJECTION INTRAMUSCULAR; INTRAVENOUS at 13:45

## 2024-09-06 RX ADMIN — MAGNESIUM SULFATE HEPTAHYDRATE 2 G: 40 INJECTION, SOLUTION INTRAVENOUS at 17:19

## 2024-09-06 RX ADMIN — ASPIRIN 81 MG CHEWABLE TABLET 324 MG: 81 TABLET CHEWABLE at 17:16

## 2024-09-06 RX ADMIN — SODIUM CHLORIDE 500 ML: 0.9 INJECTION, SOLUTION INTRAVENOUS at 13:44

## 2024-09-06 RX ADMIN — CEFTRIAXONE 1000 MG: 1 INJECTION, SOLUTION INTRAVENOUS at 17:52

## 2024-09-06 RX ADMIN — PENTOXIFYLLINE 400 MG: 400 TABLET, EXTENDED RELEASE ORAL at 17:53

## 2024-09-06 RX ADMIN — IOHEXOL 100 ML: 350 INJECTION, SOLUTION INTRAVENOUS at 14:20

## 2024-09-06 RX ADMIN — INSULIN GLARGINE 15 UNITS: 100 INJECTION, SOLUTION SUBCUTANEOUS at 21:33

## 2024-09-06 RX ADMIN — CLONIDINE HYDROCHLORIDE 0.1 MG: 0.1 TABLET ORAL at 17:53

## 2024-09-06 RX ADMIN — FUROSEMIDE 40 MG: 10 INJECTION, SOLUTION INTRAMUSCULAR; INTRAVENOUS at 18:15

## 2024-09-06 RX ADMIN — RANOLAZINE 1000 MG: 500 TABLET, FILM COATED, EXTENDED RELEASE ORAL at 17:52

## 2024-09-06 RX ADMIN — PREGABALIN 100 MG: 100 CAPSULE ORAL at 21:21

## 2024-09-06 RX ADMIN — ONDANSETRON 4 MG: 2 INJECTION INTRAMUSCULAR; INTRAVENOUS at 17:16

## 2024-09-06 RX ADMIN — PANTOPRAZOLE SODIUM 40 MG: 40 INJECTION, POWDER, FOR SOLUTION INTRAVENOUS at 21:21

## 2024-09-06 NOTE — ED NOTES
Patient transported to CT via stretcher by this RN and portable monitor, pt denies any CP.      María Figueroa RN  09/06/24 7850

## 2024-09-06 NOTE — TELEPHONE ENCOUNTER
"Pt is s/p left lower extremity above knee popliteal to below knee popliteal artery bypass with PTFE graft (Left: Leg Upper) on 8/30.    Received call from pt's daughter, Whitley, stating starting yesterday, pt has been having severe nausea, sob, abdominal pain, and dizziness. Please see triage note below for further details.    Advised pt's daughter per protocol, should be evaluated in the ED. She voiced understanding and stated will call an ambulance to take pt to [facility].       Sending to Dr. Clark as an fyi.     Reason for Disposition   MODERATE difficulty breathing (e.g., speaks in phrases, SOB even at rest, pulse 100-120) of new-onset or worse than normal    Answer Assessment - Initial Assessment Questions  1. RESPIRATORY STATUS: \"Describe your breathing?\" (e.g., wheezing, shortness of breath, unable to speak, severe coughing)       Sob at rest and exertion   2. ONSET: \"When did this breathing problem begin?\"       Had in the ED - gave direutics  which helped but now is back  3. PATTERN \"Does the difficult breathing come and go, or has it been constant since it started?\"       Constant   4. SEVERITY: \"How bad is your breathing?\" (e.g., mild, moderate, severe)     - MILD: No SOB at rest, mild SOB with walking, speaks normally in sentences, can lay down, no retractions, pulse < 100.     - MODERATE: SOB at rest, SOB with minimal exertion and prefers to sit, cannot lie down flat, speaks in phrases, mild retractions, audible wheezing, pulse 100-120.     - SEVERE: Very SOB at rest, speaks in single words, struggling to breathe, sitting hunched forward, retractions, pulse > 120       Moderate   5. RECURRENT SYMPTOM: \"Have you had difficulty breathing before?\" If Yes, ask: \"When was the last time?\" and \"What happened that time?\"       Had this issues last time he had a procedure and found out his Hemoglobin was low and needed to be on iron infusions x6   6. CARDIAC HISTORY: \"Do you have any " "history of heart disease?\" (e.g., heart attack, angina, bypass surgery, angioplasty)       Denies   7. LUNG HISTORY: \"Do you have any history of lung disease?\"  (e.g., pulmonary embolus, asthma, emphysema)      Denies - hx DVT but not PE   8. CAUSE: \"What do you think is causing the breathing problem?\"       Unsure   9. OTHER SYMPTOMS: \"Do you have any other symptoms? (e.g., dizziness, runny nose, cough, chest pain, fever)      Denies chest pain. Is having dizziness/lightheadedness, nausea, and pain in his abdomen. Pt is burping a lot. States has some diarrhea     Oxygen: 96, HR 54    Pt is eating and drinking but is having severe nausea    Answer Assessment - Initial Assessment Questions  1. NAUSEA SEVERITY: \"How bad is the nausea?\" (e.g., mild, moderate, severe; dehydration, weight loss)    - MILD: loss of appetite without change in eating habits. Has taken 2 zofran with no relief     - MODERATE: decreased oral intake without significant weight loss, dehydration, or malnutrition    - SEVERE: inadequate caloric or fluid intake, significant weight loss, symptoms of dehydration      Severe today, unable to eat today   2. ONSET: \"When did the nausea begin?\"      Yesterday but worse today   3. VOMITING: \"Any vomiting?\" If Yes, ask: \"How many times today?\"      Denies   4. RECURRENT SYMPTOM: \"Have you had nausea before?\" If Yes, ask: \"When was the last time?\" \"What happened that time?\"      Denies   5. CAUSE: \"What do you think is causing the nausea?\"      Unsure    Answer Assessment - Initial Assessment Questions  1. LOCATION: \"Where does it hurt?\"       Center of abdomen. Had dirahea 10 minutes ago and was watery. States the pain is subsided   2. RADIATION: \"Does the pain shoot anywhere else?\" (e.g., chest, back)      Denies   3. ONSET: \"When did the pain begin?\" (Minutes, hours or days ago)       Yesterday   4. SUDDEN: \"Gradual or sudden onset?\"      Gradually   5. PATTERN \"Does the pain come and go, or is it " "constant?\"     - If constant: \"Is it getting better, staying the same, or worsening?\"       (Note: Constant means the pain never goes away completely; most serious pain is constant and it progresses)      - If intermittent: \"How long does it last?\" \"Do you have pain now?\"      (Note: Intermittent means the pain goes away completely between bouts)      Comes and goes   6. SEVERITY: \"How bad is the pain?\"  (e.g., Scale 1-10; mild, moderate, or severe)     - MILD (1-3): doesn't interfere with normal activities, abdomen soft and not tender to touch      - MODERATE (4-7): interferes with normal activities or awakens from sleep, tender to touch      - SEVERE (8-10): excruciating pain, doubled over, unable to do any normal activities        Mild   7. RECURRENT SYMPTOM: \"Have you ever had this type of stomach pain before?\" If Yes, ask: \"When was the last time?\" and \"What happened that time?\"       Unsure   8. CAUSE: \"What do you think is causing the stomach pain?\"      Unsure   9. RELIEVING/AGGRAVATING FACTORS: \"What makes it better or worse?\" (e.g., movement, antacids, bowel movement)      Unsure   10. OTHER SYMPTOMS: \"Has there been any vomiting, diarrhea, constipation, or urine problems?\"        Diarrhea. Denies issues with urination    Protocols used: Breathing Difficulty-ADULT-[place], Nausea-ADULT-[place], Abdominal Pain - Male-ADULT-[place]    "

## 2024-09-06 NOTE — H&P
[facility]  H&P  Name: Thomas Horne 79 y.o. male I MRN: [id number]  Unit/Bed#: [address] [address] I Date of Admission: 9/6/2024   Date of Service: 9/6/2024 I Hospital Day: 0      Assessment & Plan   * SOB (shortness of breath)  Assessment & Plan  Patient presents to the ED for ongoing shortness of breath, nausea, poor appetite for the last 2 days.  Patient had a recent left femoral bypass procedure done on 8/30/2024 and was discharged home on 9/4/24.  He denies any associated fever/chills, chest pain, abdominal pain, urinary complaints.  CTA C/A/P:  1. No pulmonary embolism  2. Interval increase in size of a left lower lobe nodule, currently measuring up to 1.5 cm, previously 1.1 cm on June 2023 comparison. Recommending 3 month interval follow up CT chest  3. No focal consolidation. Small regions of subpleural reticulation/groundglass opacification may be sequela of prior infectious process. A new mild infectious infiltrate not excluded.  BNP elevated 1,067 with 5 lb weight gain in 2 weeks  Minimally elevated troponins, see treatment plan below  Possibly in the setting of CHF vs PNA  Will start on IV ceftriaxone. Check procalcitonin, if negative x 2 and remains afebrile will discontinue ABX  Trial dose of IV lasix 40mg tonight. Obtain echo. Reassess for further diuresis in the AM  Currently oxygenating well on RA, monitor respiratory status    Elevated troponin  Assessment & Plan  H/o CAD. Denies any chest pain  ECG: NSR with frequent PVCs  Patient has noted history of PVCs in chart   Troponin level: 0hr 103, 2hr 85 (Delta -18)  Continue ASA, statin, BB  Optimize electrolytes  Serial ECG and troponin  Monitor on telemetry  Possibly in the setting of CHF vs underlying infection    Nausea  Assessment & Plan  Poor appetite and associated nausea x 2 days. Denies any vomiting. Admits to one episode of small amount of diarrhea. No abdominal pain.   History of multiple gastric ulcers  Start on IV PPI  BID  PRN anti-emetics  Monitor     Stage 3b chronic kidney disease (HCC)  Assessment & Plan  Lab Results   Component Value Date    EGFR 64 09/06/2024    EGFR 71 09/04/2024    EGFR 49 09/03/2024    CREATININE 1.07 09/06/2024    CREATININE 0.99 09/04/2024    CREATININE 1.33 (H) 09/03/2024   Creatinine stable at baseline  Monitor daily BMP    Chronic HFpEF/Moderate pHTN (HFpEF) ([facility])  Assessment & Plan  Wt Readings from Last 3 Encounters:   09/06/24 110 kg (241 lb 13.5 oz)   08/30/24 105 kg (231 lb)   08/22/24 105 kg (231 lb)     Echo (6/17/23): EF 50% with minimally reduced systolic function. Repeat echo pending  BNP elevated 1,067 on admission  5lb weight gain noted over the last 2 weeks  CXR without vascular congestion   PTA: Torsemide 20mg daily. Will trial on dose of IV lasix 40mg tonight  Continue Imdur and jardience (will need to bring from home as we don't carry his dose)  Daily weights, I&Os    Peripheral artery disease (HCC)  Assessment & Plan  S/p left femoral bypass 8/30/24  Continue ASA, Plavix, statin  Outpatient follow up with Vascular Surgery     History of endovascular stent graft for abdominal aortic aneurysm (AAA)  Assessment & Plan  History noted    Type 2 diabetes mellitus with diabetic polyneuropathy, with long-term current use of insulin ([facility])  Assessment & Plan  Lab Results   Component Value Date    HGBA1C 7.0 (H) 09/03/2024       Recent Labs     09/03/24  2041 09/04/24  0630 09/04/24  1040   POCGLU 197* 124 181*       Blood Sugar Average: Last 72 hrs:    Continue Lantus 15U HS for BG <150  SSI with accu checks  Hypoglycemia protocol  Diabetic diet    Coronary artery disease involving native coronary artery of native heart without angina pectoris  Assessment & Plan  S/p CABG x 3 (2002) and s/p PCI with NANDINI to distal Lcx (2021)  Continue ASA, Plavix, BB, statin, ranexa   Cardiology follow up    Primary hypertension  Assessment & Plan  BP stable  Continue home amlodipine, clonidine,  metoprolol  Monitor vitals per routine           VTE Pharmacologic Prophylaxis: VTE Score: 6 High Risk (Score >/= 5) - Pharmacological DVT Prophylaxis Ordered: enoxaparin (Lovenox). Sequential Compression Devices Ordered.  Code Status: Level 3 - DNAR and DNI   Discussion with family: Attempted to update  (daughter) via phone. Left voicemail.     Anticipated Length of Stay: Patient will be admitted on an observation basis with an anticipated length of stay of less than 2 midnights secondary to SOB.      Chief Complaint: SOB, nausea    History of Present Illness:  Thomas Horne is a 79 y.o. male with a PMH of hypertension, coronary artery disease, peripheral arterial disease, CHF, CKD, diabetes.Patient presents to the ED for ongoing shortness of breath, nausea, poor appetite for the last 2 days.  Patient had a recent left femoral bypass procedure done on 8/30/2024 and was discharged home on 9/4/24.  He denies any associated fever/chills, chest pain, abdominal pain, urinary complaints.  Patient requiring medical admission for further treatment and evaluation of shortness of breath and cardiac monitoring.  All patient questions answered to the best of my ability.    Review of Systems:  Review of Systems   Constitutional:  Negative for chills and fever.   HENT:  Negative for ear pain and sore throat.    Eyes:  Negative for pain and visual disturbance.   Respiratory:  Positive for shortness of breath. Negative for cough.    Cardiovascular:  Negative for chest pain and palpitations.   Gastrointestinal:  Positive for nausea. Negative for abdominal pain and vomiting.   Genitourinary:  Negative for dysuria and hematuria.   Musculoskeletal:  Negative for arthralgias and back pain.   Skin:  Negative for color change and rash.   Neurological:  Negative for seizures and syncope.   All other systems reviewed and are negative.      Past Medical and Surgical History:   Past Medical History:   Diagnosis Date    Anemia      CAD (coronary artery disease)     Callus     Cancer (HCC)     prostate    CHF (congestive heart failure) (HCC)     Chronic kidney disease     Clotting disorder (HCC)     Coronary artery disease 1988    Deep vein thrombosis (HCC)     Diabetes mellitus (HCC)     Difficulty walking     Duodenal ulcer     Heart disease 1988    Hyperlipidemia     Hypertension     Myocardial infarction (HCC)     Neuropathy     Bilateral feet    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)     Plantar fasciitis     Sleep apnea     Could not tolerate CPAP       Past Surgical History:   Procedure Laterality Date    ABDOMINAL AORTIC ANEURYSM REPAIR      Stented    ADENOIDECTOMY      BACK SURGERY  1985    CARDIAC CATHETERIZATION Left 10/19/2022    Procedure: Cardiac Left Heart Cath;  Surgeon: Danielle Pereira MD;  Location: [facility];  Service: Cardiology    CARDIAC SURGERY  2002    3 cardiac bypass then angioplasty 7/2020    CHOLECYSTECTOMY      COLONOSCOPY  02/14/2024    CORONARY ARTERY BYPASS GRAFT      IR LOWER EXTREMITY ANGIOGRAM  11/01/2023    IR LOWER EXTREMITY ANGIOGRAM  08/07/2024    LAMINECTOMY  1990    KS BYPASS W/VEIN FEMORAL-POPLITEAL Right 11/16/2023    Procedure: BYPASS FEMORAL-POPLITEAL WITH CRYO VEIN, RIGHT FEMORAL ENDARTERECTOMY;  Surgeon: Vasquez Clark MD;  Location: [facility];  Service: Vascular    KS BYPASS W/VEIN FEMORAL-POPLITEAL Left 8/30/2024    Procedure: left lower extremity above knee popliteal to below knee popliteal artery bypass with PTFE graft;  Surgeon: Vasquez Clark MD;  Location: [facility];  Service: Vascular    KS SLCTV CATHJ 3RD+ ORD SLCTV ABDL PEL/LXTR [facility] Right 11/01/2023    Procedure: ARTERIOGRAM Right lower extremity arteriogram with CO2 via right groin access;  Surgeon: Vasquez Clark MD;  Location: [facility];  Service: Vascular    KS SLCTV CATHJ 3RD+ [facility] 08/07/[facility]    Procedure: diagnostic LLE Arteriogram;  Surgeon: Vasquez  Aram Clark MD;  Location: [facility];  Service: Vascular    PROSTATE SURGERY      TONSILLECTOMY      URINARY SPHINCTER IMPLANT         Meds/Allergies:  Prior to Admission medications    Medication Sig Start Date End Date Taking? Authorizing Provider   acetaminophen (TYLENOL) 325 mg tablet Take 2 tablets (650 mg total) by mouth every 6 (six) hours as needed for mild pain 9/4/24   APOLLO Abrams   amLODIPine (NORVASC) 10 mg tablet Take 0.5 tablets (5 mg total) by mouth daily 5/31/24   Jose Fischer MD   aspirin 81 mg chewable tablet Chew 81 mg daily    Historical Provider, MD   atorvastatin (LIPITOR) 40 mg tablet Take 1 tablet (40 mg total) by mouth daily 5/31/24   Naa Cruz PA-C   Blood Glucose Monitoring Suppl (OneTouch Verio Reflect) w/Device KIT Check blood sugars twice daily. Please substitute with appropriate alternative as covered by patient's insurance. Dx: E11.65 2/22/22   Frank Lombardi, DO   cloNIDine (CATAPRES) 0.1 mg tablet take 1 tablet by mouth every 12 hours 5/31/24   Frank Lombardi, DO   clopidogrel (PLAVIX) 75 mg tablet Take 1 tablet (75 mg total) by mouth daily 9/4/24   APOLLO Abrams   collagenase (SANTYL) ointment Apply topically daily 9/5/24   APOLLO Abrams   Droplet Pen Needles 32G X 4 MM MISC USE EVERY EVENING 9/27/23   Frank Lombardi, DO   DULoxetine (CYMBALTA) 30 mg delayed release capsule take 1 capsule by mouth once daily 8/26/24   Sundar Arango DPM   Empagliflozin (Jardiance) 25 MG TABS TAKE 1 TABLET BY MOUTH DAILY 7/5/24   Frank Lombardi, DO   fenofibrate 160 MG tablet TAKE 1 TABLET BY MOUTH DAILY 7/17/24   Danielle Pereira MD   glimepiride (AMARYL) 2 mg tablet Take 1 tablet (2 mg total) by mouth 2 (two) times a day 1/17/24 1/11/25  Frank Lombardi, DO   glucose blood (OneTouch Verio) test strip TEST TWICE A DAY 12/8/23   Frank Lombardi, DO   insulin glargine (LANTUS) 100 units/mL subcutaneous injection Inject 15 Units under the skin daily at  bedtime  Patient taking differently: Inject 15 Units under the skin daily at bedtime If BS > 150 11/21/23   APOLLO Kwong   isosorbide mononitrate (IMDUR) 30 mg 24 hr tablet Take 3 tablets (90 mg total) by mouth daily 5/10/24 11/6/24  Naa Cruz PA-C   ketoconazole (NIZORAL) 2 % cream Apply topically daily 7/9/24 9/6/24  Sundar Arango DPM   metFORMIN (GLUCOPHAGE) 500 mg tablet Take 1 tablet (500 mg total) by mouth 2 (two) times a day with meals 5/31/24   Frank Lombardi, DO   metoprolol tartrate (LOPRESSOR) 50 mg tablet Take 0.5 tablets (25 mg total) by mouth every 12 (twelve) hours 5/31/24   Naa Cruz PA-C   Multiple Vitamins-Minerals (MULTIVITAMIN MEN 50+ PO) Take by mouth daily    Historical Provider, MD   nitroglycerin (NITROSTAT) 0.4 mg SL tablet Place 1 tablet (0.4 mg total) under the tongue every 5 (five) minutes as needed for chest pain 5/10/24 9/6/24  Naa Cruz PA-C   Omega-3 Fatty Acids (fish oil) 1,000 mg Take 4,000 mg by mouth 2 (two) times a day    Historical Provider, MD   ondansetron (ZOFRAN) 8 mg tablet Take 1 tablet (8 mg total) by mouth every 8 (eight) hours as needed for nausea or vomiting 9/6/24   Frank Lombardi, DO   OneTouch Delica Lancets 33G MISC Check blood sugars twice daily. Please substitute with appropriate alternative as covered by patient's insurance. Dx: E11.65 2/22/22   Frank Lombardi, DO   pantoprazole (PROTONIX) 40 mg tablet Take 1 tablet (40 mg total) by mouth daily 5/30/24 9/6/24  APOLLO Flores   pentoxifylline (TRENtal) 400 mg ER tablet Take 1 tablet (400 mg total) by mouth 3 (three) times a day with meals 7/9/24 10/7/24  Sundar Arango DPM   pregabalin (LYRICA) 100 mg capsule Take 1 capsule (100 mg total) by mouth 3 (three) times a day 8/13/24 9/12/24  Kirk Acuna MD   ranolazine (RANEXA) 1000 MG SR tablet Take 1 tablet (1,000 mg total) by mouth 2 (two) times a day 5/10/24 5/5/25  Naa Cruz PA-C   senna (SENOKOT) 8.6 mg Take 1 tablet (8.6 mg  "total) by mouth daily Stool softener for constipation-- hold loose stools 24   APOLLO Abrams   sitaGLIPtin (Januvia) 100 mg tablet TAKE 1 TABLET BY MOUTH DAILY 24   Frank Lombardi, DO   torsemide (DEMADEX) 20 mg tablet TAKE 1 TABLET BY MOUTH DAILY 24   Danielle Pereira MD     I have reviewed home medications using recent Epic encounter.    Allergies:   Allergies   Allergen Reactions    Lisinopril Rash and Lip Swelling       Social History:  Marital Status:    Occupation: NA  Patient Pre-hospital Living Situation: Home  Patient Pre-hospital Level of Mobility: walks  Patient Pre-hospital Diet Restrictions: Diabetic  Substance Use History:   Social History     Substance and Sexual Activity   Alcohol Use Yes    Alcohol/week: 5.0 standard drinks of alcohol    Types: 5 Cans of beer per week    Comment: stopped last few months     Social History     Tobacco Use   Smoking Status Former    Current packs/day: 0.00    Average packs/day: 1.5 packs/day for 33.0 years (49.5 ttl pk-yrs)    Types: Cigarettes    Start date: 1956    Quit date:     Years since quittin.7    Passive exposure: Past   Smokeless Tobacco Never     Social History     Substance and Sexual Activity   Drug Use Never       Family History:  Family History   Problem Relation Age of Onset    Diabetes Mother     Alcohol abuse Father     Heart disease Brother     Cancer Sister         Thyroid    Cancer Sister         Colon    Cancer Brother         Throat    Cancer Brother     Diabetes Sister     Mental illness Neg Hx        Physical Exam:     Vitals:   Blood Pressure: 139/60 (24 1500)  Pulse: 58 (24 1500)  Temperature: 97.9 °F (36.6 °C) (24 1332)  Temp Source: Tympanic (24 1332)  Respirations: 20 (24 1500)  Height: 6' 2\" (188 cm) (24)  Weight - Scale: 106 kg (234 lb) (24)  SpO2: 94 % (24 1500)    Physical Exam  Vitals and nursing note reviewed.   Constitutional:      "  General: He is not in acute distress.     Appearance: He is well-developed. He is obese.   HENT:      Head: Normocephalic and atraumatic.   Eyes:      Conjunctiva/sclera: Conjunctivae normal.   Cardiovascular:      Rate and Rhythm: Normal rate and regular rhythm.      Heart sounds: No murmur heard.  Pulmonary:      Effort: Pulmonary effort is normal. No respiratory distress.   Abdominal:      General: Bowel sounds are normal.      Palpations: Abdomen is soft.      Tenderness: There is no abdominal tenderness.   Musculoskeletal:         General: No swelling.      Cervical back: Neck supple.      Right lower leg: No edema.      Left lower leg: Edema (S/p left femoral bypass on 8/30/24 with post-surgical swelling unchanged per patient) present.   Skin:     General: Skin is warm and dry.      Capillary Refill: Capillary refill takes less than 2 seconds.   Neurological:      Mental Status: He is alert and oriented to person, place, and time.   Psychiatric:         Mood and Affect: Mood normal.          Additional Data:     Lab Results:  Results from last 7 days   Lab Units 09/06/24  1339   WBC Thousand/uL 11.71*   HEMOGLOBIN g/dL 10.8*   HEMATOCRIT % 34.0*   PLATELETS Thousands/uL 414*   SEGS PCT % 82*   LYMPHO PCT % 9*   MONO PCT % 6   EOS PCT % 2     Results from last 7 days   Lab Units 09/06/24  1339   SODIUM mmol/L 137   POTASSIUM mmol/L 4.0   CHLORIDE mmol/L 103   CO2 mmol/L 26   BUN mg/dL 18   CREATININE mg/dL 1.07   ANION GAP mmol/L 8   CALCIUM mg/dL 9.1   ALBUMIN g/dL 3.3*   TOTAL BILIRUBIN mg/dL 0.69   ALK PHOS U/L 37   ALT U/L 18   AST U/L 23   GLUCOSE RANDOM mg/dL 132     Results from last 7 days   Lab Units 09/06/24  1339   INR  1.13     Results from last 7 days   Lab Units 09/04/24  1040 09/04/24  0630 09/03/24  2041 09/03/24  1538 09/03/24  1058 09/03/24  0615 09/02/24  2040 09/02/24  1516 09/02/24  1034 09/02/24  0627 09/02/24  0452 09/01/24  2053   POC GLUCOSE mg/dl 181* 124 197* 183* 215* 126 198* 167*  186* 128 110 156*     Lab Results   Component Value Date    HGBA1C 7.0 (H) 09/03/2024    HGBA1C 7.0 (H) 04/17/2024    HGBA1C 7.2 (H) 04/08/2024           Lines/Drains:  Invasive Devices       Peripheral Intravenous Line  Duration             Peripheral IV 09/06/24 Dorsal (posterior);Left Wrist <1 day    Peripheral IV 09/06/24 Dorsal (posterior);Right Forearm <1 day                        Imaging: Reviewed radiology reports from this admission including: chest xray, chest CT scan, and abdominal/pelvic CT  PE Study with CT Abdomen and Pelvis with contrast   Final Result by Prashanth Estrella MD (09/06 8556)      No pulmonary embolism.      Interval increase in size of a left lower lobe nodule, currently measuring up to 1.5 cm, previously 1.1 cm on June 2023 comparison.   Other pulmonary nodules as described, some of which are difficult to compare to prior considering infectious infiltrate at that time.   Based on current Fleischner Society 2017 Guidelines on incidental pulmonary nodule:   Either PET/CT scan evaluation, tissue sampling or short-term interval follow-up non-contrast CT follow-up (initially in 3 months) may be considered appropriate.      No focal consolidation.   Small regions of subpleural reticulation/groundglass opacification may be sequela of prior infectious process.   A new mild infectious infiltrate not excluded.      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   Follow-up chest CT recommended in 1 year.      No acute abdominopelvic abnormality.      The study was marked in EPIC for immediate notification.         Workstation performed: [id number]         XR chest 1 view portable   Final Result by Enzo Garg MD (09/06 3263)      No acute cardiopulmonary disease. Stable elevated right hemidiaphragm with overlying atelectasis.            Workstation performed: [id number]             EKG and Other Studies Reviewed on Admission:   EKG: Personally Reviewed. NSR with  frequent PVCs.    ** Please Note: This note has been constructed using a voice recognition system. **

## 2024-09-06 NOTE — ASSESSMENT & PLAN NOTE
Lab Results   Component Value Date    HGBA1C 7.0 (H) 09/03/2024       Recent Labs     09/03/24 2041 09/04/24  0630 09/04/24  1040   POCGLU 197* 124 181*       Blood Sugar Average: Last 72 hrs:    Continue Lantus 15U HS for BG <150  SSI with accu checks  Hypoglycemia protocol  Diabetic diet

## 2024-09-06 NOTE — TELEPHONE ENCOUNTER
Received call from Marilyn to confirm diagnosis of anxiety or depression related to patient's order for Cymbalta. Medication is ordered by Sundar Arango DPM. Please follow up with Marilyn for provider response to Marilyn's inquiry for specific diagnosis.

## 2024-09-06 NOTE — ASSESSMENT & PLAN NOTE
Poor appetite and associated nausea x 2 days. Denies any vomiting. Admits to one episode of small amount of diarrhea. No abdominal pain.   History of multiple gastric ulcers  Start on IV PPI BID  PRN anti-emetics  Monitor

## 2024-09-06 NOTE — ASSESSMENT & PLAN NOTE
H/o CAD. Denies any chest pain  ECG: NSR with frequent PVCs  Patient has noted history of PVCs in chart   Troponin level: 0hr 103, 2hr 85 (Delta -18)  Continue ASA, statin, BB  Optimize electrolytes  Serial ECG and troponin  Monitor on telemetry  Possibly in the setting of CHF vs underlying infection

## 2024-09-06 NOTE — ASSESSMENT & PLAN NOTE
Patient presents to the ED for ongoing shortness of breath, nausea, poor appetite for the last 2 days.  Patient had a recent left femoral bypass procedure done on 8/30/2024 and was discharged home on 9/4/24.  He denies any associated fever/chills, chest pain, abdominal pain, urinary complaints.  CTA C/A/P:  1. No pulmonary embolism  2. Interval increase in size of a left lower lobe nodule, currently measuring up to 1.5 cm, previously 1.1 cm on June 2023 comparison. Recommending 3 month interval follow up CT chest  3. No focal consolidation. Small regions of subpleural reticulation/groundglass opacification may be sequela of prior infectious process. A new mild infectious infiltrate not excluded.  BNP elevated 1,067 with 5 lb weight gain in 2 weeks  Minimally elevated troponins, see treatment plan below  Possibly in the setting of CHF vs PNA  Will start on IV ceftriaxone. Check procalcitonin, if negative x 2 and remains afebrile will discontinue ABX  Trial dose of IV lasix 40mg tonight. Obtain echo. Reassess for further diuresis in the AM  Currently oxygenating well on RA, monitor respiratory status

## 2024-09-06 NOTE — TELEPHONE ENCOUNTER
Spoke with Marilyn from [facility]  [phone] she is aware pt is in [facility].  bridger

## 2024-09-06 NOTE — PROGRESS NOTES
HRR referral received via IB. Chart reviewed. Patient admitted to IP at [facility] [place] 8/30-9/4 with peripheral artery disease and elective left distal superficial femoral artery bypass to below knee popliteal artery bypass. Daily ASA, Plavix and Atorvastatin . Patient discharged home with services through [facility]. Per notes: Hospital bed and WC, daughter to have ramp installed at home    PMH: HTN, HLD, CAD s/p CABG, AAA s/p EVAR, type II DM, CHF, pulmonary HTN, CKD3b, prostate CA s/p prostatectomy with urinary sphincter placement, provoked LLE DVT 04/2024, and PAD.     This RNCM called patient and introduced self and explained the role of the RNCM and CCM services. Patient says he is not feeling so well. He says he has a lot of nausea and has SOB even at rest. Patients daughter is also on speaker phone and endorses this. She states that the last time he had surgery he felt this same way and he had a low hgb. She says it is not r/t CHF and they went through this the last time and Cardiology was consulted and the left hand did not know what the right was doing. They states they fell more comfortable and confident that Dr Lombardi manage this time. I told them I would send him a message regarding their concerns of low hgb and see if he would like to get BW done before next weeks apt. Patient states he is taking Zofran for his nausea. Per Med List this was discontinued at [place]. Patient says he has some left over from last prescription and is taking. I told them I would see if Dr Lombardi would send a new prescription to the pharmacy for him.    Patient manages his own medications and his Medications and AVS were reviewed. Patient says he takes him medications as directed and has no questions or concerns other then getting a prescription for Zofran.    Patient and daughter state patients incision site looks good and is healing well. No s/s of infection. No drainage, redness or warmth. Denies fever or chills. There is slight  swelling in the leg but the surgeon said that is normal. Patient is putting his leg up throughout the day.    Patient and daughter states the Nurse through [facility] will be out today to do wound care and he will be getting PT as well.    Patients daughter states they cancelled the order for WC and hospital bed and they are having a Lift Chair delivered today for patient to sleep in. They have the first for set up for him then.    I provided patient with my contact information and encouraged him to call with any questions or concerns. He and his daughter were appreciative of the call and agreeable to CCM services and follow up.     RNCM to follow up next week.     IB message sent to Dr Lombardi regarding above concerns we discussed, SOB, nausea, possibly check hgb and new script for Zofran.

## 2024-09-06 NOTE — ASSESSMENT & PLAN NOTE
Wt Readings from Last 3 Encounters:   09/06/24 110 kg (241 lb 13.5 oz)   08/30/24 105 kg (231 lb)   08/22/24 105 kg (231 lb)     Echo (6/17/23): EF 50% with minimally reduced systolic function. Repeat echo pending  BNP elevated 1,067 on admission  5lb weight gain noted over the last 2 weeks  CXR without vascular congestion   PTA: Torsemide 20mg daily. Will trial on dose of IV lasix 40mg tonight  Continue Imdur and jardience (will need to bring from home as we don't carry his dose)  Daily weights, I&Os

## 2024-09-06 NOTE — TELEPHONE ENCOUNTER
----- Message from Olga QUE sent at 9/6/2024 12:00 PM EDT -----  Regarding: SOB/nausea/ZOFRAN/BW  Good morning! I just spoke with patient and his daughter. Patient says he is not feeling so good. He has been feeling SOB, and nauseated. He says he has a script for Zofran that he has been taking which has helped some. He only has a few pills left. It looks like they dc'd this at dc from the hospital. Patients daughter says patient felt like this the last time he had surgery and it was because he had a low hgb and he needed a blood transfusion. She says that they trust you over all the doctors and would prefer I speak with you regarding even though he is following with surgery too. Can we get a CBC ordered before patients apt with you next week to check hgb and can you send a new script for Zofran to the pharmacy? Please advise! Thank you! Olga

## 2024-09-06 NOTE — TELEPHONE ENCOUNTER
Caller: FELIX bro[facility]     Doctor: Conchita     Reason for call: Calling to verify the reason for this medication (CYMBALTA)     Please advise     Call back#:[phone]

## 2024-09-06 NOTE — TELEPHONE ENCOUNTER
Spoke with Joycelyn from [facility] and let her know per Dr. Arango to help with his pain but if he is not having any pain he doesn't need to take it.

## 2024-09-06 NOTE — ASSESSMENT & PLAN NOTE
S/p CABG x 3 (2002) and s/p PCI with NANDINI to distal Lcx (2021)  Continue ASA, Plavix, BB, statin, ranexa   Cardiology follow up

## 2024-09-06 NOTE — INCIDENTAL FINDINGS
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    Follow up required: CT chest   Follow up should be done within 1 year    Please notify the following clinician to assist with the follow up:   Dr. Lombardi    Incidental finding results were discussed with the Patient by Silvia Steve PA-C on 09/06/24.   They expressed understanding and all questions answered.

## 2024-09-06 NOTE — PLAN OF CARE
Problem: Potential for Falls  Goal: Patient will remain free of falls  Description: INTERVENTIONS:  - Educate patient/family on patient safety including physical limitations  - Instruct patient to call for assistance with activity   - Consult OT/PT to assist with strengthening/mobility   - Keep Call bell within reach  - Keep bed low and locked with side rails adjusted as appropriate  - Keep care items and personal belongings within reach  - Initiate and maintain comfort rounds  - Make Fall Risk Sign visible to staff  - Initiate/Maintain bed alarm  - Obtain necessary fall risk management equipment: yellow socks  - Apply yellow socks and bracelet for high fall risk patients  - Consider moving patient to room near nurses station  Outcome: Progressing     Problem: Prexisting or High Potential for Compromised Skin Integrity  Goal: Skin integrity is maintained or improved  Description: INTERVENTIONS:  - Identify patients at risk for skin breakdown  - Assess and monitor skin integrity  - Assess and monitor nutrition and hydration status  - Monitor labs   - Assess for incontinence   - Turn and reposition patient  - Assist with mobility/ambulation  - Relieve pressure over bony prominences  - Avoid friction and shearing  - Provide appropriate hygiene as needed including keeping skin clean and dry  - Evaluate need for skin moisturizer/barrier cream  - Collaborate with interdisciplinary team   - Patient/family teaching  - Consider wound care consult   Outcome: Progressing     Problem: Nutrition/Hydration-ADULT  Goal: Nutrient/Hydration intake appropriate for improving, restoring or maintaining nutritional needs  Description: Monitor and assess patient's nutrition/hydration status for malnutrition. Collaborate with interdisciplinary team and initiate plan and interventions as ordered.  Monitor patient's weight and dietary intake as ordered or per policy. Utilize nutrition screening tool and intervene as necessary. Determine  patient's food preferences and provide high-protein, high-caloric foods as appropriate.     INTERVENTIONS:  - Monitor oral intake, urinary output, labs, and treatment plans  - Assess nutrition and hydration status and recommend course of action  - Evaluate amount of meals eaten  - Assist patient with eating if necessary   - Allow adequate time for meals  - Recommend/ encourage appropriate diets, oral nutritional supplements, and vitamin/mineral supplements  - Order, calculate, and assess calorie counts as needed  - Recommend, monitor, and adjust tube feedings and TPN/PPN based on assessed needs  - Assess need for intravenous fluids  - Provide specific nutrition/hydration education as appropriate  - Include patient/family/caregiver in decisions related to nutrition  Outcome: Progressing     Problem: SAFETY ADULT  Goal: Patient will remain free of falls  Description: INTERVENTIONS:  - Educate patient/family on patient safety including physical limitations  - Instruct patient to call for assistance with activity   - Consult OT/PT to assist with strengthening/mobility   - Keep Call bell within reach  - Keep bed low and locked with side rails adjusted as appropriate  - Keep care items and personal belongings within reach  - Initiate and maintain comfort rounds  - Make Fall Risk Sign visible to staff  - Initiate/Maintain bed alarm  - Obtain necessary fall risk management equipment: yellow socks  - Apply yellow socks and bracelet for high fall risk patients  - Consider moving patient to room near nurses station  Outcome: Progressing  Goal: Maintain or return to baseline ADL function  Description: INTERVENTIONS:  -  Assess patient's ability to carry out ADLs; assess patient's baseline for ADL function and identify physical deficits which impact ability to perform ADLs (bathing, care of mouth/teeth, toileting, grooming, dressing, etc.)  - Assess/evaluate cause of self-care deficits   - Assess range of motion  - Assess  patient's mobility; develop plan if impaired  - Assess patient's need for assistive devices and provide as appropriate  - Encourage maximum independence but intervene and supervise when necessary  - Involve family in performance of ADLs  - Assess for home care needs following discharge   - Consider OT consult to assist with ADL evaluation and planning for discharge  - Provide patient education as appropriate  Outcome: Progressing  Goal: Maintains/Returns to pre admission functional level  Description: INTERVENTIONS:  - Perform AM-PAC 6 Click Basic Mobility/ Daily Activity assessment daily.  - Set and communicate daily mobility goal to care team and patient/family/caregiver.   - Collaborate with rehabilitation services on mobility goals if consulted  - Perform Range of Motion 3 times a day.  - Reposition patient every 2 hours.  - Dangle patient 3 times a day  - Stand patient 3 times a day  - Ambulate patient 2 times a day  - Out of bed to chair 3 times a day   - Out of bed for meals 3 times a day  - Out of bed for toileting  - Record patient progress and toleration of activity level   Outcome: Progressing     Problem: DISCHARGE PLANNING  Goal: Discharge to home or other facility with appropriate resources  Description: INTERVENTIONS:  - Identify barriers to discharge w/patient and caregiver  - Arrange for needed discharge resources and transportation as appropriate  - Identify discharge learning needs (meds, wound care, etc.)  - Arrange for interpretive services to assist at discharge as needed  - Refer to Case Management Department for coordinating discharge planning if the patient needs post-hospital services based on physician/advanced practitioner order or complex needs related to functional status, cognitive ability, or social support system  Outcome: Progressing     Problem: MUSCULOSKELETAL - ADULT  Goal: Maintain or return mobility to safest level of function  Description: INTERVENTIONS:  - Assess patient's  ability to carry out ADLs; assess patient's baseline for ADL function and identify physical deficits which impact ability to perform ADLs (bathing, care of mouth/teeth, toileting, grooming, dressing, etc.)  - Assess/evaluate cause of self-care deficits   - Assess range of motion  - Assess patient's mobility  - Assess patient's need for assistive devices and provide as appropriate  - Encourage maximum independence but intervene and supervise when necessary  - Involve family in performance of ADLs  - Assess for home care needs following discharge   - Consider OT consult to assist with ADL evaluation and planning for discharge  - Provide patient education as appropriate  Outcome: Progressing  Goal: Maintain proper alignment of affected body part  Description: INTERVENTIONS:  - Support, maintain and protect limb and body alignment  - Provide patient/ family with appropriate education  Outcome: Progressing

## 2024-09-06 NOTE — ASSESSMENT & PLAN NOTE
Lab Results   Component Value Date    EGFR 64 09/06/2024    EGFR 71 09/04/2024    EGFR 49 09/03/2024    CREATININE 1.07 09/06/2024    CREATININE 0.99 09/04/2024    CREATININE 1.33 (H) 09/03/2024   Creatinine stable at baseline  Monitor daily BMP

## 2024-09-06 NOTE — ED PROVIDER NOTES
History  Chief Complaint   Patient presents with    Shortness of Breath     Pt arrives via ALS c/o SOB and nausea since yesterday morning. Per EMS pt had multiple PVCs, nonperfusing en route to ED. Pt reports baseline HR in 50s. Denies palpitations. Surgery to left leg on 8/30/24. No c/o pain at site of surgery.      Patient presents for evaluation of shortness of breath associated with nausea since yesterday morning.  Patient was just discharged from the hospital after having vascular surgery on his left lower leg.  He has some pain in the heel where he has a wound but otherwise leg feels well.  EMS reported he had frequent PVCs on the monitor that were non-perfusing with a low heart rate.       History provided by:  Patient and EMS personnel   used: No    Shortness of Breath  Associated symptoms: no abdominal pain and no vomiting        Prior to Admission Medications   Prescriptions Last Dose Informant Patient Reported? Taking?   Blood Glucose Monitoring Suppl (OneTouch Verio Reflect) w/Device KIT 9/6/2024 Self No Yes   Sig: Check blood sugars twice daily. Please substitute with appropriate alternative as covered by patient's insurance. Dx: E11.65   DULoxetine (CYMBALTA) 30 mg delayed release capsule 9/6/2024  No Yes   Sig: take 1 capsule by mouth once daily   Droplet Pen Needles 32G X 4 MM MISC Past Month Self No Yes   Sig: USE EVERY EVENING   Empagliflozin (Jardiance) 25 MG TABS 9/6/2024 Self No Yes   Sig: TAKE 1 TABLET BY MOUTH DAILY   Multiple Vitamins-Minerals (MULTIVITAMIN MEN 50+ PO)  Self Yes No   Sig: Take by mouth daily   Omega-3 Fatty Acids (fish oil) 1,000 mg 9/6/2024 Self Yes Yes   Sig: Take 4,000 mg by mouth 2 (two) times a day   OneTouch Delica Lancets 33G MISC Past Month Self No Yes   Sig: Check blood sugars twice daily. Please substitute with appropriate alternative as covered by patient's insurance. Dx: E11.65   acetaminophen (TYLENOL) 325 mg tablet 9/6/2024  No Yes   Sig:  Take 2 tablets (650 mg total) by mouth every 6 (six) hours as needed for mild pain   amLODIPine (NORVASC) 10 mg tablet 9/6/2024 Self No Yes   Sig: Take 0.5 tablets (5 mg total) by mouth daily   aspirin 81 mg chewable tablet 9/6/2024 Self Yes Yes   Sig: Chew 81 mg daily   atorvastatin (LIPITOR) 40 mg tablet 9/5/2024 Self No Yes   Sig: Take 1 tablet (40 mg total) by mouth daily   cloNIDine (CATAPRES) 0.1 mg tablet 9/6/2024 Self No Yes   Sig: take 1 tablet by mouth every 12 hours   clopidogrel (PLAVIX) 75 mg tablet 9/6/2024  No Yes   Sig: Take 1 tablet (75 mg total) by mouth daily   collagenase (SANTYL) ointment Past Week  No Yes   Sig: Apply topically daily   fenofibrate 160 MG tablet 9/6/2024 Self No Yes   Sig: TAKE 1 TABLET BY MOUTH DAILY   glimepiride (AMARYL) 2 mg tablet 9/6/2024 Self No Yes   Sig: Take 1 tablet (2 mg total) by mouth 2 (two) times a day   glucose blood (OneTouch Verio) test strip 9/6/2024 Self No Yes   Sig: TEST TWICE A DAY   insulin glargine (LANTUS) 100 units/mL subcutaneous injection Past Week Self No Yes   Sig: Inject 15 Units under the skin daily at bedtime   Patient taking differently: Inject 15 Units under the skin daily at bedtime If BS > 150   isosorbide mononitrate (IMDUR) 30 mg 24 hr tablet 9/6/2024 Self No Yes   Sig: Take 3 tablets (90 mg total) by mouth daily   ketoconazole (NIZORAL) 2 % cream  Self No No   Sig: Apply topically daily   metFORMIN (GLUCOPHAGE) 500 mg tablet 9/6/2024 Self No Yes   Sig: Take 1 tablet (500 mg total) by mouth 2 (two) times a day with meals   metoprolol tartrate (LOPRESSOR) 50 mg tablet 9/6/2024 Self No Yes   Sig: Take 0.5 tablets (25 mg total) by mouth every 12 (twelve) hours   nitroglycerin (NITROSTAT) 0.4 mg SL tablet Past Month Self No Yes   Sig: Place 1 tablet (0.4 mg total) under the tongue every 5 (five) minutes as needed for chest pain   ondansetron (ZOFRAN) 8 mg tablet 9/6/2024  No Yes   Sig: Take 1 tablet (8 mg total) by mouth every 8 (eight) hours  as needed for nausea or vomiting   pantoprazole (PROTONIX) 40 mg tablet 9/6/2024 Self No Yes   Sig: Take 1 tablet (40 mg total) by mouth daily   pentoxifylline (TRENtal) 400 mg ER tablet 9/6/2024 Self No Yes   Sig: Take 1 tablet (400 mg total) by mouth 3 (three) times a day with meals   pregabalin (LYRICA) 100 mg capsule 9/6/2024 Self No Yes   Sig: Take 1 capsule (100 mg total) by mouth 3 (three) times a day   ranolazine (RANEXA) 1000 MG SR tablet 9/6/2024 Self No Yes   Sig: Take 1 tablet (1,000 mg total) by mouth 2 (two) times a day   senna (SENOKOT) 8.6 mg More than a month  No No   Sig: Take 1 tablet (8.6 mg total) by mouth daily Stool softener for constipation-- hold loose stools   sitaGLIPtin (Januvia) 100 mg tablet 9/6/2024 Self No Yes   Sig: TAKE 1 TABLET BY MOUTH DAILY   torsemide (DEMADEX) 20 mg tablet 9/6/2024 Self No Yes   Sig: TAKE 1 TABLET BY MOUTH DAILY      Facility-Administered Medications: None       Past Medical History:   Diagnosis Date    Anemia     CAD (coronary artery disease)     Callus     Cancer (HCC)     prostate    CHF (congestive heart failure) (HCC)     Chronic kidney disease     Clotting disorder (HCC)     Coronary artery disease 1988    Deep vein thrombosis (HCC)     Diabetes mellitus (HCC)     Difficulty walking     Duodenal ulcer     Heart disease 1988    Hyperlipidemia     Hypertension     Myocardial infarction (HCC)     Neuropathy     Bilateral feet    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)     Plantar fasciitis     Sleep apnea     Could not tolerate CPAP       Past Surgical History:   Procedure Laterality Date    ABDOMINAL AORTIC ANEURYSM REPAIR      Stented    ADENOIDECTOMY      BACK SURGERY  1985    CARDIAC CATHETERIZATION Left 10/19/2022    Procedure: Cardiac Left Heart Cath;  Surgeon: Danielle Pereira MD;  Location: [facility];  Service: Cardiology    CARDIAC SURGERY  2002    3 cardiac bypass then angioplasty 7/2020    CHOLECYSTECTOMY      COLONOSCOPY  02/14/2024    CORONARY  ARTERY BYPASS GRAFT      IR LOWER EXTREMITY ANGIOGRAM  2023    IR LOWER EXTREMITY ANGIOGRAM  2024    LAMINECTOMY  1990    NE BYPASS W/VEIN FEMORAL-POPLITEAL Right 2023    Procedure: BYPASS FEMORAL-POPLITEAL WITH CRYO VEIN, RIGHT FEMORAL ENDARTERECTOMY;  Surgeon: Vasquez Clark MD;  Location: [facility];  Service: Vascular    NE BYPASS W/VEIN FEMORAL-POPLITEAL Left 2024    Procedure: left lower extremity above knee popliteal to below knee popliteal artery bypass with PTFE graft;  Surgeon: Vasquez Clark MD;  Location: [facility];  Service: Vascular    NE SLCTV CATHJ 3RD+ ORD SLCTV ABDL PEL/LXTR BRNCH Right 2023    Procedure: ARTERIOGRAM Right lower extremity arteriogram with CO2 via right groin access;  Surgeon: Vasquez Clark MD;  Location: [facility];  Service: Vascular    NE SLCTV CATHJ 3RD+ ORD SLCTV ABDL PEL/LXTR BRNCH Left 2024    Procedure: diagnostic LLE Arteriogram;  Surgeon: Vasquez Clark MD;  Location: [facility];  Service: Vascular    PROSTATE SURGERY      TONSILLECTOMY      URINARY SPHINCTER IMPLANT         Family History   Problem Relation Age of Onset    Diabetes Mother     Alcohol abuse Father     Heart disease Brother     Cancer Sister         Thyroid    Cancer Sister         Colon    Cancer Brother         Throat    Cancer Brother     Diabetes Sister     Mental illness Neg Hx      I have reviewed and agree with the history as documented.    E-Cigarette/Vaping    E-Cigarette Use Never User      E-Cigarette/Vaping Substances    Nicotine No     THC No     CBD No     Flavoring No     Other No     Unknown No      Social History     Tobacco Use    Smoking status: Former     Current packs/day: 0.00     Average packs/day: 1.5 packs/day for 33.0 years (49.5 ttl pk-yrs)     Types: Cigarettes     Start date: 1956     Quit date:      Years since quittin.7     Passive exposure: Past    Smokeless tobacco: Never    Vaping Use    Vaping status: Never Used   Substance Use Topics    Alcohol use: Not Currently     Alcohol/week: 5.0 standard drinks of alcohol     Types: 5 Cans of beer per week     Comment: stopped last few months    Drug use: Never       Review of Systems   Respiratory:  Positive for shortness of breath.    Gastrointestinal:  Positive for nausea. Negative for abdominal pain and vomiting.   All other systems reviewed and are negative.      Physical Exam  Physical Exam  Vitals and nursing note reviewed.   Constitutional:       General: He is not in acute distress.     Appearance: He is ill-appearing.   Eyes:      General: No scleral icterus.     Conjunctiva/sclera: Conjunctivae normal.   Cardiovascular:      Rate and Rhythm: Regular rhythm. Bradycardia present.   Pulmonary:      Effort: Pulmonary effort is normal. No respiratory distress.      Breath sounds: Normal breath sounds.   Abdominal:      Tenderness: There is no abdominal tenderness.   Skin:     Capillary Refill: Capillary refill takes less than 2 seconds.      Findings: No rash.          Neurological:      General: No focal deficit present.      Mental Status: He is alert and oriented to person, place, and time.         Vital Signs  ED Triage Vitals   Temperature Pulse Respirations Blood Pressure SpO2   09/06/24 1332 09/06/24 1332 09/06/24 1332 09/06/24 1332 09/06/24 1331   97.9 °F (36.6 °C) 60 20 139/66 96 %      Temp Source Heart Rate Source Patient Position - Orthostatic VS BP Location FiO2 (%)   09/06/24 1332 09/06/24 1332 09/06/24 1332 09/06/24 1332 --   Tympanic Monitor Lying Right arm       Pain Score       09/06/24 1820       No Pain           Vitals:    09/07/24 0100 09/07/24 0515 09/07/24 0805 09/07/24 0905   BP:  131/57 133/61 133/61   Pulse: (!) 50 (!) 54 55 59   Patient Position - Orthostatic VS:             Visual Acuity      ED Medications  Medications   amLODIPine (NORVASC) tablet 5 mg (5 mg Oral Given 9/7/24 0956)   aspirin chewable  tablet 81 mg (81 mg Oral Given 9/7/24 0955)   atorvastatin (LIPITOR) tablet 40 mg (40 mg Oral Given 9/7/24 0956)   cloNIDine (CATAPRES) tablet 0.1 mg (0.1 mg Oral Given 9/7/24 0517)   clopidogrel (PLAVIX) tablet 75 mg (75 mg Oral Given 9/7/24 0956)   DULoxetine (CYMBALTA) delayed release capsule 30 mg (30 mg Oral Given 9/7/24 0956)   fenofibrate (TRICOR) tablet 145 mg (145 mg Oral Given 9/7/24 0955)   insulin glargine (LANTUS) subcutaneous injection 15 Units 0.15 mL (15 Units Subcutaneous Given 9/6/24 2133)   isosorbide mononitrate (IMDUR) 24 hr tablet 90 mg (90 mg Oral Given 9/7/24 0956)   metoprolol tartrate (LOPRESSOR) tablet 25 mg (25 mg Oral Given 9/7/24 0956)   pentoxifylline (TRENtal) ER tablet 400 mg (400 mg Oral Given 9/7/24 1252)   pregabalin (LYRICA) capsule 100 mg (100 mg Oral Given 9/7/24 0956)   ranolazine (RANEXA) 12 hr tablet 1,000 mg (1,000 mg Oral Given 9/7/24 0955)   acetaminophen (TYLENOL) tablet 650 mg (650 mg Oral Given 9/7/24 1328)   enoxaparin (LOVENOX) subcutaneous injection 40 mg (40 mg Subcutaneous Given 9/7/24 0956)   insulin lispro (HumALOG/ADMELOG) 100 units/mL subcutaneous injection 1-6 Units ( Subcutaneous Not Given 9/7/24 1149)   ondansetron (ZOFRAN) injection 4 mg (has no administration in time range)   pantoprazole (PROTONIX) injection 40 mg (40 mg Intravenous Given 9/7/24 0956)   torsemide (DEMADEX) tablet 20 mg (20 mg Oral Given 9/7/24 0955)   collagenase (SANTYL) ointment 1 Application (1 Application Topical Given 9/7/24 1252)   sodium chloride 0.9 % bolus 500 mL (0 mL Intravenous Stopped 9/6/24 1444)   ondansetron (ZOFRAN) injection 4 mg (4 mg Intravenous Given 9/6/24 1345)   iohexol (OMNIPAQUE) 350 MG/ML injection (MULTI-DOSE) 100 mL (100 mL Intravenous Given 9/6/24 1420)   ondansetron (ZOFRAN) injection 4 mg (4 mg Intravenous Given 9/6/24 1716)   aspirin chewable tablet 324 mg (324 mg Oral Given 9/6/24 1716)   magnesium sulfate 2 g/50 mL IVPB (premix) 2 g (2 g Intravenous New  Bag 9/6/24 1719)   furosemide (LASIX) injection 40 mg (40 mg Intravenous Given 9/6/24 1815)   potassium chloride (Klor-Con M20) CR tablet 20 mEq (20 mEq Oral Given 9/7/24 0955)       Diagnostic Studies  Results Reviewed       Procedure Component Value Units Date/Time    Procalcitonin [[id number]]  (Normal) Collected: 09/07/24 0519    Lab Status: Final result Specimen: Blood from Arm, Left Updated: 09/07/24 0706     Procalcitonin 0.09 ng/ml     Basic metabolic panel [[id number]]  (Abnormal) Collected: 09/07/24 0519    Lab Status: Final result Specimen: Blood from Arm, Left Updated: 09/07/24 0655     Sodium 139 mmol/L      Potassium 3.6 mmol/L      Chloride 103 mmol/L      CO2 28 mmol/L      ANION GAP 8 mmol/L      BUN 17 mg/dL      Creatinine 1.25 mg/dL      Glucose 59 mg/dL      Glucose, Fasting 59 mg/dL      Calcium 8.8 mg/dL      eGFR 53 ml/min/1.73sq m     Narrative:      National Kidney Disease Foundation guidelines for Chronic Kidney Disease (CKD):     Stage 1 with normal or high GFR (GFR > 90 mL/min/1.73 square meters)    Stage 2 Mild CKD (GFR = 60-89 mL/min/1.73 square meters)    Stage 3A Moderate CKD (GFR = 45-59 mL/min/1.73 square meters)    Stage 3B Moderate CKD (GFR = 30-44 mL/min/1.73 square meters)    Stage 4 Severe CKD (GFR = 15-29 mL/min/1.73 square meters)    Stage 5 End Stage CKD (GFR <15 mL/min/1.73 square meters)  Note: GFR calculation is accurate only with a steady state creatinine    Magnesium [[id number]]  (Normal) Collected: 09/07/24 0519    Lab Status: Final result Specimen: Blood from Arm, Left Updated: 09/07/24 0655     Magnesium 2.2 mg/dL     CBC (With Platelets) [[id number]]  (Abnormal) Collected: 09/07/24 0519    Lab Status: Final result Specimen: Blood from Arm, Left Updated: 09/07/24 0634     WBC 10.19 Thousand/uL      RBC 4.04 Million/uL      Hemoglobin 10.6 g/dL      Hematocrit 34.3 %      MCV 85 fL      MCH 26.2 pg      MCHC 30.9 g/dL      RDW 16.0 %      Platelets 396 Thousands/uL       MPV 10.7 fL     Procalcitonin [[id number]]  (Normal) Collected: 09/06/24 1339    Lab Status: Final result Specimen: Blood from Arm, Right Updated: 09/06/24 1752     Procalcitonin 0.09 ng/ml     HS Troponin I 2hr [[id number]]  (Abnormal) Collected: 09/06/24 1543    Lab Status: Final result Specimen: Blood from Arm, Right Updated: 09/06/24 1635     hs TnI 2hr 85 ng/L      Delta 2hr hsTnI -18 ng/L     UA (URINE) with reflex to Scope [[id number]]  (Abnormal) Collected: 09/06/24 1452    Lab Status: Final result Specimen: Urine, Clean Catch Updated: 09/06/24 1539     Color, UA Yellow     Clarity, UA Clear     Specific Gravity, UA <1.005     pH, UA 5.5     Leukocytes, UA Negative     Nitrite, UA Negative     Protein, UA Negative mg/dl      Glucose, UA 1000 (1%) mg/dl      Ketones, UA Negative mg/dl      Urobilinogen, UA <2.0 mg/dl      Bilirubin, UA Negative     Occult Blood, UA Negative    FLU/RSV/COVID - if FLU/RSV clinically relevant [[id number]]  (Normal) Collected: 09/06/24 1339    Lab Status: Final result Specimen: Nares from Nose Updated: 09/06/24 1444     SARS-CoV-2 Negative     INFLUENZA A PCR Negative     INFLUENZA B PCR Negative     RSV PCR Negative    Narrative:      This test has been performed using the CoV-2/Flu/RSV plus assay on the [employer] GeneXpert platform. This test has been validated by the  and verified by the performing laboratory.     This test is designed to amplify and detect the following: nucleocapsid (N), envelope (E), and RNA-dependent RNA polymerase (RdRP) genes of the SARS-CoV-2 genome; matrix (M), basic polymerase (PB2), and acidic protein (PA) segments of the influenza A genome; matrix (M) and non-structural protein (NS) segments of the influenza B genome, and the nucleocapsid genes of RSV A and RSV B.     Positive results are indicative of the presence of Flu A, Flu B, RSV, and/or SARS-CoV-2 RNA. Positive results for SARS-CoV-2 or suspected novel influenza should be reported to  state, local, or federal health departments according to local reporting requirements.      All results should be assessed in conjunction with clinical presentation and other laboratory markers for clinical management.     FOR PEDIATRIC PATIENTS - copy/paste COVID Guidelines URL to browser: [email or web address]       HS Troponin 0hr (reflex protocol) [[id number]]  (Abnormal) Collected: 09/06/24 1339    Lab Status: Final result Specimen: Blood from Arm, Right Updated: 09/06/24 1415     hs TnI 0hr 103 ng/L     B-Type Natriuretic Peptide(BNP) [[id number]]  (Abnormal) Collected: 09/06/24 1339    Lab Status: Final result Specimen: Blood from Arm, Right Updated: 09/06/24 1414     BNP 1,067 pg/mL     CBC and differential [[id number]]  (Abnormal) Collected: 09/06/24 1339    Lab Status: Final result Specimen: Blood from Arm, Right Updated: 09/06/24 1412     WBC 11.71 Thousand/uL      RBC 4.12 Million/uL      Hemoglobin 10.8 g/dL      Hematocrit 34.0 %      MCV 83 fL      MCH 26.2 pg      MCHC 31.8 g/dL      RDW 15.9 %      MPV 10.1 fL      Platelets 414 Thousands/uL      nRBC 0 /100 WBCs      Segmented % 82 %      Immature Grans % 1 %      Lymphocytes % 9 %      Monocytes % 6 %      Eosinophils Relative 2 %      Basophils Relative 0 %      Absolute Neutrophils 9.56 Thousands/µL      Absolute Immature Grans 0.12 Thousand/uL      Absolute Lymphocytes 1.09 Thousands/µL      Absolute Monocytes 0.67 Thousand/µL      Eosinophils Absolute 0.24 Thousand/µL      Basophils Absolute 0.03 Thousands/µL     D-Dimer [[id number]]  (Abnormal) Collected: 09/06/24 1339    Lab Status: Final result Specimen: Blood from Arm, Right Updated: 09/06/24 1408     D-Dimer, Quant 2.02 ug/ml FEU     Narrative:      In the evaluation for possible pulmonary embolism, in the appropriate (Well's Score of 4 or less) patient, the age adjusted d-dimer cutoff for this patient can be calculated as:    Age x 0.01  (in ug/mL) for Age-adjusted D-dimer exclusion threshold for a patient over 50 years.    Protime-INR [[id number]]  (Abnormal) Collected: 09/06/24 1339    Lab Status: Final result Specimen: Blood from Arm, Right Updated: 09/06/24 1408     Protime 15.1 seconds      INR 1.13    Narrative:      INR Therapeutic Range    Indication                                             INR Range      Atrial Fibrillation                                               2.0-3.0  Hypercoagulable State                                    2.0.2.3  Left Ventricular Asist Device                            2.0-3.0  Mechanical Heart Valve                                  -    Aortic(with afib, MI, embolism, HF, LA enlargement,    and/or coagulopathy)                                     2.0-3.0 (2.5-3.5)     Mitral                                                             2.5-3.5  Prosthetic/Bioprosthetic Heart Valve               2.0-3.0  Venous thromboembolism (VTE: VT, PE        2.0-3.0    APTT [[id number]]  (Normal) Collected: 09/06/24 1339    Lab Status: Final result Specimen: Blood from Arm, Right Updated: 09/06/24 1408     PTT 30 seconds     Comprehensive metabolic panel [[id number]]  (Abnormal) Collected: 09/06/24 1339    Lab Status: Final result Specimen: Blood from Arm, Right Updated: 09/06/24 1408     Sodium 137 mmol/L      Potassium 4.0 mmol/L      Chloride 103 mmol/L      CO2 26 mmol/L      ANION GAP 8 mmol/L      BUN 18 mg/dL      Creatinine 1.07 mg/dL      Glucose 132 mg/dL      Calcium 9.1 mg/dL      Corrected Calcium 9.7 mg/dL      AST 23 U/L      ALT 18 U/L      Alkaline Phosphatase 37 U/L      Total Protein 6.8 g/dL      Albumin 3.3 g/dL      Total Bilirubin 0.69 mg/dL      eGFR 64 ml/min/1.73sq m     Narrative:      National Kidney Disease Foundation guidelines for Chronic Kidney Disease (CKD):     Stage 1 with normal or high GFR (GFR > 90 mL/min/1.73 square meters)    Stage 2 Mild CKD (GFR = 60-89 mL/min/1.73 square meters)     Stage 3A Moderate CKD (GFR = 45-59 mL/min/1.73 square meters)    Stage 3B Moderate CKD (GFR = 30-44 mL/min/1.73 square meters)    Stage 4 Severe CKD (GFR = 15-29 mL/min/1.73 square meters)    Stage 5 End Stage CKD (GFR <15 mL/min/1.73 square meters)  Note: GFR calculation is accurate only with a steady state creatinine    Lipase [[id number]]  (Normal) Collected: 09/06/24 1339    Lab Status: Final result Specimen: Blood from Arm, Right Updated: 09/06/24 1408     Lipase 34 u/L                    PE Study with CT Abdomen and Pelvis with contrast   Final Result by Prashanth Estrella MD (09/06 3486)      No pulmonary embolism.      Interval increase in size of a left lower lobe nodule, currently measuring up to 1.5 cm, previously 1.1 cm on June 2023 comparison.   Other pulmonary nodules as described, some of which are difficult to compare to prior considering infectious infiltrate at that time.   Based on current Fleischner Society 2017 Guidelines on incidental pulmonary nodule:   Either PET/CT scan evaluation, tissue sampling or short-term interval follow-up non-contrast CT follow-up (initially in 3 months) may be considered appropriate.      No focal consolidation.   Small regions of subpleural reticulation/groundglass opacification may be sequela of prior infectious process.   A new mild infectious infiltrate not excluded.      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   Follow-up chest CT recommended in 1 year.      No acute abdominopelvic abnormality.      The study was marked in EPIC for immediate notification.         Workstation performed: [id number]         XR chest 1 view portable   Final Result by Enzo Garg MD (09/06 1608)      No acute cardiopulmonary disease. Stable elevated right hemidiaphragm with overlying atelectasis.            Workstation performed: [id number]                    Procedures  ECG 12 Lead Documentation Only    Date/Time: 9/6/2024 1:36 PM    Performed by:  Prashanth Rosales DO  Authorized by: Prashanth Rosales DO    ECG reviewed by me, the ED Provider: yes    Patient location:  ED  Interpretation:     Interpretation: abnormal    Rate:     ECG rate:  63    ECG rate assessment: normal    Rhythm:     Rhythm: sinus rhythm    Ectopy:     Ectopy: PVCs      PVCs:  Frequent  QRS:     QRS axis:  Left  ST segments:     ST segments:  Normal  Other findings:     Other findings: LVH             ED Course                                     Wells' Criteria for PE      Flowsheet Row Most Recent Value   Wells' Criteria for PE    Clinical signs and symptoms of DVT 0 Filed at: 09/06/2024 1414   PE is primary diagnosis or equally likely 3 Filed at: 09/06/2024 1414   HR >100 0 Filed at: 09/06/2024 1414   Immobilization at least 3 days or Surgery in the previous 4 weeks 1.5 Filed at: 09/06/2024 1414   Previous, objectively diagnosed PE or DVT 0 Filed at: 09/06/2024 1414   Hemoptysis 0 Filed at: 09/06/2024 1414   Malignancy with treatment within 6 months or palliative 0 Filed at: 09/06/2024 1414   Wells' Criteria Total 4.5 Filed at: 09/06/2024 1414                  Medical Decision Making  Pulse ox 93% on room air indicating adequate oxygenation.  CXR: NAD as read by me      Differential diagnose include but not limited to arrhythmia, ACS, COVID-19, pneumonia, pulmonary embolism      Troponin was elevated in the ER.  Case discussed with hospitalist on-call Dr. Davalos for admission to medical service.    Amount and/or Complexity of Data Reviewed  Labs: ordered.  Radiology: ordered and independent interpretation performed.  ECG/medicine tests: ordered and independent interpretation performed.    Risk  OTC drugs.  Prescription drug management.  Decision regarding hospitalization.                 Disposition  Final diagnoses:   Dyspnea   Elevated troponin     Time reflects when diagnosis was documented in both MDM as applicable and the Disposition within this note       Time User Action Codes  Description Comment    9/6/2024  4:32 PM Prashanth Rosales Add [R06.00] Dyspnea     9/6/2024  4:33 PM Prashanth Rosales Add [R79.89] Elevated troponin     9/6/2024  6:40 PM HallieVlad zambrano Add [L97.509] Foot ulcer (HCC)     9/7/2024 11:53 AM AnnmariehenriqueconradSilvia Add [E11.51,  Z79.4] Type 2 diabetes mellitus with diabetic peripheral angiopathy without gangrene, with long-term current use of insulin (HCC)     9/7/2024 11:58 AM Silvia Steve Add [R11.0] Nausea           ED Disposition       ED Disposition   Admit    Condition   Stable    Date/Time   Fri Sep 6, 2024 1632    Comment   Case was discussed with Dr. Davalos and the patient's admission status was agreed to be Admission Status: observation status to the service of Dr. Davalos .               Follow-up Information       Follow up With Specialties Details Why Contact Info    [facility] Home Health Services Follow up  [address] [facility] [address]  [phone]      Frank Lombardi, DO Family Medicine, Wound Care Follow up  [address]  [address]  [facility] [address]  [phone]      Sundar Arango DPM Podiatry Follow up  [address] [facility] [address]  [phone]      Naun Bell MD Cardiology   [address] [facility]  [address]  [facility] [address]  [phone]              Discharge Medication List as of 9/7/2024  1:19 PM        CONTINUE these medications which have CHANGED    Details   ondansetron (ZOFRAN) 8 mg tablet Take 0.5 tablets (4 mg total) by mouth every 6 (six) hours as needed for nausea or vomiting, Starting Sat 9/7/2024, Normal           CONTINUE these medications which have NOT CHANGED    Details   acetaminophen (TYLENOL) 325 mg tablet Take 2 tablets (650 mg total) by mouth every 6 (six) hours as needed for mild pain, Starting Wed 9/4/2024, OTC      amLODIPine (NORVASC) 10 mg tablet Take 0.5 tablets (5 mg total) by mouth daily, Starting Fri 5/31/2024, Normal      aspirin 81 mg chewable tablet  Chew 81 mg daily, Historical Med      atorvastatin (LIPITOR) 40 mg tablet Take 1 tablet (40 mg total) by mouth daily, Starting Fri 5/31/2024, Normal      Blood Glucose Monitoring Suppl (OneTouch Verio Reflect) w/Device KIT Check blood sugars twice daily. Please substitute with appropriate alternative as covered by patient's insurance. Dx: E11.65, Normal      cloNIDine (CATAPRES) 0.1 mg tablet take 1 tablet by mouth every 12 hours, Starting Fri 5/31/2024, Normal      clopidogrel (PLAVIX) 75 mg tablet Take 1 tablet (75 mg total) by mouth daily, Starting Wed 9/4/2024, Normal      collagenase (SANTYL) ointment Apply topically daily, Starting Thu 9/5/2024, Normal      Droplet Pen Needles 32G X 4 MM MISC USE EVERY EVENING, Normal      DULoxetine (CYMBALTA) 30 mg delayed release capsule take 1 capsule by mouth once daily, Starting Mon 8/26/2024, Normal      Empagliflozin (Jardiance) 25 MG TABS TAKE 1 TABLET BY MOUTH DAILY, Starting Fri 7/5/2024, Normal      fenofibrate 160 MG tablet TAKE 1 TABLET BY MOUTH DAILY, Starting Wed 7/17/2024, Normal      glimepiride (AMARYL) 2 mg tablet Take 1 tablet (2 mg total) by mouth 2 (two) times a day, Starting Wed 1/17/2024, Until Sat 1/11/2025, Normal      glucose blood (OneTouch Verio) test strip TEST TWICE A DAY, Normal      insulin glargine (LANTUS) 100 units/mL subcutaneous injection Inject 15 Units under the skin daily at bedtime, Starting Tue 11/21/2023, No Print      isosorbide mononitrate (IMDUR) 30 mg 24 hr tablet Take 3 tablets (90 mg total) by mouth daily, Starting Fri 5/10/2024, Until Wed 11/6/2024, Normal      metFORMIN (GLUCOPHAGE) 500 mg tablet Take 1 tablet (500 mg total) by mouth 2 (two) times a day with meals, Starting Fri 5/31/2024, Normal      metoprolol tartrate (LOPRESSOR) 50 mg tablet Take 0.5 tablets (25 mg total) by mouth every 12 (twelve) hours, Starting Fri 5/31/2024, Normal      nitroglycerin (NITROSTAT) 0.4 mg SL tablet Place 1 tablet (0.4 mg total) under the  tongue every 5 (five) minutes as needed for chest pain, Starting Fri 5/10/2024, Until Fri 9/6/2024 at 2359, Normal      Omega-3 Fatty Acids (fish oil) 1,000 mg Take 4,000 mg by mouth 2 (two) times a day, Historical Med      OneTouch Delica Lancets 33G MISC Check blood sugars twice daily. Please substitute with appropriate alternative as covered by patient's insurance. Dx: E11.65, Normal      pantoprazole (PROTONIX) 40 mg tablet Take 1 tablet (40 mg total) by mouth daily, Starting Thu 5/30/2024, Until Fri 9/6/2024, Normal      pentoxifylline (TRENtal) 400 mg ER tablet Take 1 tablet (400 mg total) by mouth 3 (three) times a day with meals, Starting Tue 7/9/2024, Until Mon 10/7/2024, Normal      pregabalin (LYRICA) 100 mg capsule Take 1 capsule (100 mg total) by mouth 3 (three) times a day, Starting Tue 8/13/2024, Until Thu 9/12/2024, Normal      ranolazine (RANEXA) 1000 MG SR tablet Take 1 tablet (1,000 mg total) by mouth 2 (two) times a day, Starting Fri 5/10/2024, Until Mon 5/5/2025, Normal      sitaGLIPtin (Januvia) 100 mg tablet TAKE 1 TABLET BY MOUTH DAILY, Starting Wed 7/17/2024, Normal      torsemide (DEMADEX) 20 mg tablet TAKE 1 TABLET BY MOUTH DAILY, Starting Thu 6/6/2024, Normal      ketoconazole (NIZORAL) 2 % cream Apply topically daily, Starting Tue 7/9/2024, Until Fri 9/6/2024, Normal      Multiple Vitamins-Minerals (MULTIVITAMIN MEN 50+ PO) Take by mouth daily, Historical Med      senna (SENOKOT) 8.6 mg Take 1 tablet (8.6 mg total) by mouth daily Stool softener for constipation-- hold loose stools, Starting Wed 9/4/2024, OTC                 PDMP Review         Value Time User    PDMP Reviewed  Yes 9/3/2024 11:30 AM Jaci Gregg PA-C            ED Provider  Electronically Signed by             Prashanth Rosales DO  09/07/24 [id number]

## 2024-09-06 NOTE — INCIDENTAL FINDINGS
The following findings require follow up:  Radiographic finding   Finding: Interval increase in size of a left lower lobe nodule, currently measuring up to 1.5 cm, previously 1.1 cm on June 2023 comparison.    Follow up required: CT chest   Follow up should be done within 3 month(s)    Please notify the following clinician to assist with the follow up:   Dr. Lombardi    Incidental finding results were discussed with the Patient by Silvia Steve PA-C on 09/06/24.   They expressed understanding and all questions answered.

## 2024-09-06 NOTE — ASSESSMENT & PLAN NOTE
S/p left femoral bypass 8/30/24  Continue ASA, Plavix, statin  Outpatient follow up with Vascular Surgery

## 2024-09-07 ENCOUNTER — APPOINTMENT (OUTPATIENT)
Dept: NON INVASIVE DIAGNOSTICS | Facility: HOSPITAL | Age: 79
End: 2024-09-07
Payer: COMMERCIAL

## 2024-09-07 VITALS
SYSTOLIC BLOOD PRESSURE: 133 MMHG | TEMPERATURE: 97.6 F | DIASTOLIC BLOOD PRESSURE: 61 MMHG | BODY MASS INDEX: 29.13 KG/M2 | OXYGEN SATURATION: 93 % | HEART RATE: 59 BPM | WEIGHT: 227 LBS | RESPIRATION RATE: 15 BRPM | HEIGHT: 74 IN

## 2024-09-07 PROBLEM — L97.509 FOOT ULCER (HCC): Status: ACTIVE | Noted: 2024-09-07

## 2024-09-07 LAB
ANION GAP SERPL CALCULATED.3IONS-SCNC: 8 MMOL/L (ref 4–13)
AORTIC ROOT: 3.6 CM
AORTIC VALVE MEAN VELOCITY: 26.3 M/S
APICAL FOUR CHAMBER EJECTION FRACTION: 60 %
AV AREA BY CONTINUOUS VTI: 1.2 CM2
AV AREA PEAK VELOCITY: 1.1 CM2
AV LVOT MEAN GRADIENT: 3 MMHG
AV LVOT PEAK GRADIENT: 5 MMHG
AV MEAN GRADIENT: 32 MMHG
AV PEAK GRADIENT: 60 MMHG
AV VALVE AREA: 1.17 CM2
AV VELOCITY RATIO: 0.3
BSA FOR ECHO PROCEDURE: 2.29 M2
BUN SERPL-MCNC: 17 MG/DL (ref 5–25)
CALCIUM SERPL-MCNC: 8.8 MG/DL (ref 8.4–10.2)
CHLORIDE SERPL-SCNC: 103 MMOL/L (ref 96–108)
CO2 SERPL-SCNC: 28 MMOL/L (ref 21–32)
CREAT SERPL-MCNC: 1.25 MG/DL (ref 0.6–1.3)
DOP CALC AO PEAK VEL: 3.84 M/S
DOP CALC AO VTI: 86.29 CM
DOP CALC LVOT AREA: 3.8 CM2
DOP CALC LVOT CARDIAC INDEX: 2.47 L/MIN/M2
DOP CALC LVOT CARDIAC OUTPUT: 5.69 L/MIN
DOP CALC LVOT DIAMETER: 2.2 CM
DOP CALC LVOT PEAK VEL VTI: 26.62 CM
DOP CALC LVOT PEAK VEL: 1.14 M/S
DOP CALC LVOT STROKE INDEX: 42.6 ML/M2
DOP CALC LVOT STROKE VOLUME: 101.14
E WAVE DECELERATION TIME: 242 MS
E/A RATIO: 1.3
ERYTHROCYTE [DISTWIDTH] IN BLOOD BY AUTOMATED COUNT: 16 % (ref 11.6–15.1)
FRACTIONAL SHORTENING: 24 (ref 28–44)
GFR SERPL CREATININE-BSD FRML MDRD: 53 ML/MIN/1.73SQ M
GLUCOSE P FAST SERPL-MCNC: 59 MG/DL (ref 65–99)
GLUCOSE SERPL-MCNC: 164 MG/DL (ref 65–140)
GLUCOSE SERPL-MCNC: 59 MG/DL (ref 65–140)
GLUCOSE SERPL-MCNC: 84 MG/DL (ref 65–140)
HCT VFR BLD AUTO: 34.3 % (ref 36.5–49.3)
HGB BLD-MCNC: 10.6 G/DL (ref 12–17)
INTERVENTRICULAR SEPTUM IN DIASTOLE (PARASTERNAL SHORT AXIS VIEW): 1.1 CM
INTERVENTRICULAR SEPTUM: 1.1 CM (ref 0.6–1.1)
LAAS-AP2: 26.9 CM2
LAAS-AP4: 26.6 CM2
LEFT ATRIUM SIZE: 5.7 CM
LEFT ATRIUM VOLUME (MOD BIPLANE): 99 ML
LEFT ATRIUM VOLUME INDEX (MOD BIPLANE): 43 ML/M2
LEFT INTERNAL DIMENSION IN SYSTOLE: 4.5 CM (ref 2.1–4)
LEFT VENTRICULAR INTERNAL DIMENSION IN DIASTOLE: 5.9 CM (ref 3.5–6)
LEFT VENTRICULAR POSTERIOR WALL IN END DIASTOLE: 1.1 CM
LEFT VENTRICULAR STROKE VOLUME: 82 ML
LVSV (TEICH): 82 ML
MAGNESIUM SERPL-MCNC: 2.2 MG/DL (ref 1.9–2.7)
MCH RBC QN AUTO: 26.2 PG (ref 26.8–34.3)
MCHC RBC AUTO-ENTMCNC: 30.9 G/DL (ref 31.4–37.4)
MCV RBC AUTO: 85 FL (ref 82–98)
MV E'TISSUE VEL-SEP: 7 CM/S
MV PEAK A VEL: 0.86 M/S
MV PEAK E VEL: 112 CM/S
MV STENOSIS PRESSURE HALF TIME: 70 MS
MV VALVE AREA P 1/2 METHOD: 3.14
PLATELET # BLD AUTO: 396 THOUSANDS/UL (ref 149–390)
PMV BLD AUTO: 10.7 FL (ref 8.9–12.7)
POTASSIUM SERPL-SCNC: 3.6 MMOL/L (ref 3.5–5.3)
PROCALCITONIN SERPL-MCNC: 0.09 NG/ML
RA PRESSURE ESTIMATED: 8 MMHG
RBC # BLD AUTO: 4.04 MILLION/UL (ref 3.88–5.62)
RIGHT ATRIUM AREA SYSTOLE A4C: 11.9 CM2
RIGHT VENTRICLE ID DIMENSION: 3.4 CM
RV PSP: 38 MMHG
SL CV LEFT ATRIUM LENGTH A2C: 5.7 CM
SL CV LV EF: 50
SL CV PED ECHO LEFT VENTRICLE DIASTOLIC VOLUME (MOD BIPLANE) 2D: 172 ML
SL CV PED ECHO LEFT VENTRICLE SYSTOLIC VOLUME (MOD BIPLANE) 2D: 90 ML
SODIUM SERPL-SCNC: 139 MMOL/L (ref 135–147)
TR MAX PG: 30 MMHG
TR PEAK VELOCITY: 2.8 M/S
TRICUSPID ANNULAR PLANE SYSTOLIC EXCURSION: 1.4 CM
TRICUSPID VALVE PEAK REGURGITATION VELOCITY: 2.75 M/S
WBC # BLD AUTO: 10.19 THOUSAND/UL (ref 4.31–10.16)

## 2024-09-07 PROCEDURE — 85027 COMPLETE CBC AUTOMATED: CPT | Performed by: INTERNAL MEDICINE

## 2024-09-07 PROCEDURE — 93306 TTE W/DOPPLER COMPLETE: CPT

## 2024-09-07 PROCEDURE — 83735 ASSAY OF MAGNESIUM: CPT | Performed by: INTERNAL MEDICINE

## 2024-09-07 PROCEDURE — 93306 TTE W/DOPPLER COMPLETE: CPT | Performed by: INTERNAL MEDICINE

## 2024-09-07 PROCEDURE — 82948 REAGENT STRIP/BLOOD GLUCOSE: CPT

## 2024-09-07 PROCEDURE — 84145 PROCALCITONIN (PCT): CPT | Performed by: INTERNAL MEDICINE

## 2024-09-07 PROCEDURE — 99239 HOSP IP/OBS DSCHRG MGMT >30: CPT | Performed by: INTERNAL MEDICINE

## 2024-09-07 PROCEDURE — 80048 BASIC METABOLIC PNL TOTAL CA: CPT | Performed by: INTERNAL MEDICINE

## 2024-09-07 RX ORDER — ONDANSETRON 4 MG/1
4 TABLET, FILM COATED ORAL EVERY 8 HOURS PRN
Qty: 20 TABLET | Refills: 0 | Status: CANCELLED | OUTPATIENT
Start: 2024-09-07

## 2024-09-07 RX ORDER — TORSEMIDE 20 MG/1
20 TABLET ORAL DAILY
Status: DISCONTINUED | OUTPATIENT
Start: 2024-09-07 | End: 2024-09-07 | Stop reason: HOSPADM

## 2024-09-07 RX ORDER — POTASSIUM CHLORIDE 1500 MG/1
20 TABLET, EXTENDED RELEASE ORAL ONCE
Status: COMPLETED | OUTPATIENT
Start: 2024-09-07 | End: 2024-09-07

## 2024-09-07 RX ORDER — ONDANSETRON 8 MG/1
4 TABLET, FILM COATED ORAL EVERY 6 HOURS PRN
Qty: 20 TABLET | Refills: 0 | Status: SHIPPED | OUTPATIENT
Start: 2024-09-07

## 2024-09-07 RX ADMIN — PENTOXIFYLLINE 400 MG: 400 TABLET, EXTENDED RELEASE ORAL at 10:01

## 2024-09-07 RX ADMIN — METOPROLOL TARTRATE 25 MG: 25 TABLET, FILM COATED ORAL at 09:56

## 2024-09-07 RX ADMIN — ATORVASTATIN CALCIUM 40 MG: 40 TABLET, FILM COATED ORAL at 09:56

## 2024-09-07 RX ADMIN — DULOXETINE HYDROCHLORIDE 30 MG: 30 CAPSULE, DELAYED RELEASE ORAL at 09:56

## 2024-09-07 RX ADMIN — COLLAGENASE SANTYL 1 APPLICATION: 250 OINTMENT TOPICAL at 12:52

## 2024-09-07 RX ADMIN — CLOPIDOGREL 75 MG: 75 TABLET ORAL at 09:56

## 2024-09-07 RX ADMIN — ASPIRIN 81 MG CHEWABLE TABLET 81 MG: 81 TABLET CHEWABLE at 09:55

## 2024-09-07 RX ADMIN — ACETAMINOPHEN 650 MG: 325 TABLET ORAL at 13:28

## 2024-09-07 RX ADMIN — TORSEMIDE 20 MG: 20 TABLET ORAL at 09:55

## 2024-09-07 RX ADMIN — AMLODIPINE BESYLATE 5 MG: 5 TABLET ORAL at 09:56

## 2024-09-07 RX ADMIN — ISOSORBIDE MONONITRATE 90 MG: 60 TABLET, EXTENDED RELEASE ORAL at 09:56

## 2024-09-07 RX ADMIN — FENOFIBRATE 145 MG: 145 TABLET, FILM COATED ORAL at 09:55

## 2024-09-07 RX ADMIN — PREGABALIN 100 MG: 100 CAPSULE ORAL at 09:56

## 2024-09-07 RX ADMIN — PENTOXIFYLLINE 400 MG: 400 TABLET, EXTENDED RELEASE ORAL at 12:52

## 2024-09-07 RX ADMIN — PANTOPRAZOLE SODIUM 40 MG: 40 INJECTION, POWDER, FOR SOLUTION INTRAVENOUS at 09:56

## 2024-09-07 RX ADMIN — RANOLAZINE 1000 MG: 500 TABLET, FILM COATED, EXTENDED RELEASE ORAL at 09:55

## 2024-09-07 RX ADMIN — ENOXAPARIN SODIUM 40 MG: 40 INJECTION SUBCUTANEOUS at 09:56

## 2024-09-07 RX ADMIN — POTASSIUM CHLORIDE 20 MEQ: 1500 TABLET, EXTENDED RELEASE ORAL at 09:55

## 2024-09-07 RX ADMIN — CLONIDINE HYDROCHLORIDE 0.1 MG: 0.1 TABLET ORAL at 05:17

## 2024-09-07 NOTE — CASE MANAGEMENT
Case Management Discharge Planning Note    Patient name Thomas Horne  Location [address] [place] [address]/[address] [place] [address]-* MRN [id number]  : 1945 Date 2024       Current Admission Date: 2024  Current Admission Diagnosis:SOB (shortness of breath)   Patient Active Problem List    Diagnosis Date Noted Date Diagnosed    Foot ulcer (HCC) 2024     SOB (shortness of breath) 2024     Nausea 2024     Elevated troponin 2024     History of DVT (deep vein thrombosis) 2024     Diabetic ulcer of toe of left foot associated with type 2 diabetes mellitus, limited to breakdown of skin (HCC) 2024     Plantar fasciitis, right 07/10/2024     Acute deep vein thrombosis (DVT) of left peroneal vein ([facility]) 2024     Iron deficiency anemia 2024     Shortness of breath 2024     History of colon polyps 2024     Duodenal ulcer 2024     Acute blood loss anemia 2024     Multiple gastric ulcers 2023     Iron deficiency anemia due to chronic blood loss 2023     Melena 2023     Symptomatic anemia 2023     Acute respiratory distress 2023     Frequent PVCs 2023     Acute on chronic diastolic heart failure (HCC) 2023     Stage 3b chronic kidney disease (HCC) 2023     Diabetic ulcer of right midfoot associated with diabetes mellitus due to underlying condition, limited to breakdown of skin ([facility]) 06/15/2023     Hypertensive urgency 2023     Elevated troponin level not due myocardial infarction 2023     Stable angina pectoris 2023     Chronic kidney disease-mineral and bone disorder 2022     Mild aortic stenosis 2022     Chronic HFpEF/Moderate pHTN (HFpEF) ([facility]) 11/10/2022     Gross hematuria 2022     Platelets decreased ([facility]) 2022     Acute kidney injury superimposed on chronic kidney disease  ([facility]) 2022     Actinic keratoses 2022     Type 2 diabetes mellitus with diabetic  peripheral angiopathy without gangrene, with long-term current use of insulin ([facility]) 01/11/2022     Varicose veins of left lower extremity 06/25/2021     History of endovascular stent graft for abdominal aortic aneurysm (AAA) 06/25/2021     Bruit (arterial) 06/25/2021     Peripheral artery disease (HCC) 06/25/2021     Type 2 diabetes mellitus with diabetic polyneuropathy, with long-term current use of insulin (HCC) 06/10/2021     Mixed hyperlipidemia 05/11/2021     Primary hypertension 05/11/2021     Coronary artery disease involving native coronary artery of native heart without angina pectoris 05/11/2021     S/P angioplasty with stent 05/11/2021     Hx of CABG 05/11/2021     S/P AAA repair 05/11/2021     S/P prostatectomy 05/11/2021     H/O prostate cancer 05/11/2021       LOS (days): 0  Geometric Mean LOS (GMLOS) (days):   Days to GMLOS:     OBJECTIVE:            Current admission status: Observation   Preferred Pharmacy:   [facility] [address]  [address]  [facility] [address]  Phone: [phone] Fax: [phone]    [facility] [place] [facility] [address] [place] [address] [address] [facility] [address]  [facility] [address]  Phone: [phone] Fax: [phone]    [facility] pharmacy [place], [place] - [address] [place] [address]  [address] [facility] [address]  Phone: [phone] Fax: [phone]    Primary Care Provider: Frank Lombardi, DO    Primary Insurance: JUAN BROOKS  Secondary Insurance:     DISCHARGE DETAILS:    Requested Home Health Care         Is the patient interested in HHC at discharge?: Yes  Home Health Discipline requested:: Nursing, Occupational Therapy, Physical Therapy  Home Health Agency Name:: [facility]  HHA External Referral Reason (only applicable if external HHA name selected): Patient has established relationship with provider  Home Health Follow-Up Provider:: PCP  Home Health Services Needed::  Strengthening/Theraputic Exercises to Improve Function, Evaluate Functional Status and Safety, Gait/ADL Training  Homebound Criteria Met:: Requires the Assistance of Another Person for Safe Ambulation or to Leave the Home, Uses an Assist Device (i.e. cane, walker, etc)  Supporting Clincal Findings:: Limited Endurance, Fatigues Easliy in Short Distances    Other Referral/Resources/Interventions Provided:  Interventions: [facility]  Referral Comments: HUY referral sent to [facility] in [place].

## 2024-09-07 NOTE — ASSESSMENT & PLAN NOTE
Diabetic foot ulcer. Seen by Podiatry on 9/4/24 during previous admission  No evidence of acute infection  Continue routine wound care per nursing  Outpatient Podiatry follow up. Has VNA to his home for wound care

## 2024-09-07 NOTE — ASSESSMENT & PLAN NOTE
Poor appetite and associated nausea x 2 days. Denies any vomiting. Admits to one episode of small amount of diarrhea. No abdominal pain.   History of multiple gastric ulcers  Start on IV PPI BID. Discharge on oral PPI  PRN anti-emetics  Nausea resolved

## 2024-09-07 NOTE — ASSESSMENT & PLAN NOTE
H/o CAD. Denies any chest pain  ECG: NSR with frequent PVCs  Patient has noted history of PVCs in chart   Troponin level: 0hr 103, 2hr 85 (Delta -18)  Continue ASA, statin, BB  Optimize electrolytes  Monitor on telemetry- no acute events overnight  In the setting of acute CHF exacerbation

## 2024-09-07 NOTE — ASSESSMENT & PLAN NOTE
Lab Results   Component Value Date    HGBA1C 7.0 (H) 09/03/2024       Recent Labs     09/04/24  1040 09/06/24  1755 09/06/24  2101 09/07/24  0719   POCGLU 181* 79 90 84         Blood Sugar Average: Last 72 hrs:  (P) [id number]  Continue Lantus 15U HS for BG <150  SSI with accu checks  Hypoglycemia protocol  Diabetic diet

## 2024-09-07 NOTE — ASSESSMENT & PLAN NOTE
Lab Results   Component Value Date    EGFR 53 09/07/2024    EGFR 64 09/06/2024    EGFR 71 09/04/2024    CREATININE 1.25 09/07/2024    CREATININE 1.07 09/06/2024    CREATININE 0.99 09/04/2024   Creatinine stable at baseline  Monitor daily BMP

## 2024-09-07 NOTE — ASSESSMENT & PLAN NOTE
Wt Readings from Last 3 Encounters:   09/07/24 103 kg (227 lb)   08/30/24 105 kg (231 lb)   08/22/24 105 kg (231 lb)     Echo (6/17/23): EF 50% with minimally reduced systolic function. Repeat echo pending  BNP elevated 1,067 on admission  5lb weight gain noted over the last 2 weeks  CXR without vascular congestion   SOB and weight improved. Transition back to home torsemide 20mg daily  Continue Imdur and jardience  Daily weights, I&Os

## 2024-09-07 NOTE — DISCHARGE INSTR - AVS FIRST PAGE
Follow ups:  -PCP  -Cardiology  -Podiatry    Other instructions:  -Sent a script for as needed zofran to your pharmacy to take for nausea  -Please see PCP and have repeat CT chest completed in 3 months to further evaluate pulmonary nodules  -Continue care with V[facility] services of [facility]

## 2024-09-07 NOTE — PLAN OF CARE
Problem: Potential for Falls  Goal: Patient will remain free of falls  Description: INTERVENTIONS:  - Educate patient/family on patient safety including physical limitations  - Instruct patient to call for assistance with activity   - Consult OT/PT to assist with strengthening/mobility   - Keep Call bell within reach  - Keep bed low and locked with side rails adjusted as appropriate  - Keep care items and personal belongings within reach  - Initiate and maintain comfort rounds  - Make Fall Risk Sign visible to staff  - Initiate/Maintain bed alarm  - Obtain necessary fall risk management equipment: yellow socks  - Apply yellow socks and bracelet for high fall risk patients  - Consider moving patient to room near nurses station  Outcome: Progressing

## 2024-09-07 NOTE — ASSESSMENT & PLAN NOTE
Patient presents to the ED for ongoing shortness of breath, nausea, poor appetite for the last 2 days.  Patient had a recent left femoral bypass procedure done on 8/30/2024 and was discharged home on 9/4/24.  He denies any associated fever/chills, chest pain, abdominal pain, urinary complaints.  CTA C/A/P:  1. No pulmonary embolism  2. Interval increase in size of a left lower lobe nodule, currently measuring up to 1.5 cm, previously 1.1 cm on June 2023 comparison. Recommending 3 month interval follow up CT chest  3. No focal consolidation. Small regions of subpleural reticulation/groundglass opacification may be sequela of prior infectious process. A new mild infectious infiltrate not excluded.  BNP elevated 1,067 with 5 lb weight gain in 2 weeks  Minimally elevated troponins, see treatment plan below  Received IV ceftriaxone in the ED. Procalcitonin negative x 2. No infectious symptoms. Will stop ABX.   Echo: The left ventricular ejection fraction is 50% by visual estimation. Systolic function is low normal. There is mild global hypokinesis. Diastolic function is moderately abnormal, consistent with grade II (pseudonormal) relaxation.  Weight improved back to baseline 103lb. SOB resolved. Likely in the setting of acute CHF. Transition back to torsemide 20mg daily. Follow up outpatient with Cardiologist  Currently oxygenating well on RA

## 2024-09-07 NOTE — DISCHARGE SUMMARY
[facility]  Discharge- Thomas Horne 1945, 79 y.o. male MRN: [id number]  Unit/Bed#: [address] [place] [address] Encounter: [id number]  Primary Care Provider: Frank Lombardi, DO   Date and time admitted to hospital: 9/6/2024  1:19 PM    * SOB (shortness of breath)  Assessment & Plan  Patient presents to the ED for ongoing shortness of breath, nausea, poor appetite for the last 2 days.  Patient had a recent left femoral bypass procedure done on 8/30/2024 and was discharged home on 9/4/24.  He denies any associated fever/chills, chest pain, abdominal pain, urinary complaints.  CTA C/A/P:  1. No pulmonary embolism  2. Interval increase in size of a left lower lobe nodule, currently measuring up to 1.5 cm, previously 1.1 cm on June 2023 comparison. Recommending 3 month interval follow up CT chest  3. No focal consolidation. Small regions of subpleural reticulation/groundglass opacification may be sequela of prior infectious process. A new mild infectious infiltrate not excluded.  BNP elevated 1,067 with 5 lb weight gain in 2 weeks  Minimally elevated troponins, see treatment plan below  Received IV ceftriaxone in the ED. Procalcitonin negative x 2. No infectious symptoms. Will stop ABX.   Echo: The left ventricular ejection fraction is 50% by visual estimation. Systolic function is low normal. There is mild global hypokinesis. Diastolic function is moderately abnormal, consistent with grade II (pseudonormal) relaxation.  Weight improved back to baseline 103lb. SOB resolved. Likely in the setting of acute CHF. Transition back to torsemide 20mg daily. Follow up outpatient with Cardiologist  Currently oxygenating well on RA    Elevated troponin  Assessment & Plan  H/o CAD. Denies any chest pain  ECG: NSR with frequent PVCs  Patient has noted history of PVCs in chart   Troponin level: 0hr 103, 2hr 85 (Delta -18)  Continue ASA, statin, BB  Optimize electrolytes  Monitor on telemetry- no acute events  overnight  In the setting of acute CHF exacerbation    Nausea  Assessment & Plan  Poor appetite and associated nausea x 2 days. Denies any vomiting. Admits to one episode of small amount of diarrhea. No abdominal pain.   History of multiple gastric ulcers  Start on IV PPI BID. Discharge on oral PPI  PRN anti-emetics  Nausea resolved    Foot ulcer ([facility])  Assessment & Plan  Diabetic foot ulcer. Seen by Podiatry on 9/4/24 during previous admission  No evidence of acute infection  Continue routine wound care per nursing  Outpatient Podiatry follow up. Has VNA to his home for wound care    Stage 3b chronic kidney disease (HCC)  Assessment & Plan  Lab Results   Component Value Date    EGFR 53 09/07/2024    EGFR 64 09/06/2024    EGFR 71 09/04/2024    CREATININE 1.25 09/07/2024    CREATININE 1.07 09/06/2024    CREATININE 0.99 09/04/2024   Creatinine stable at baseline  Monitor daily BMP    Chronic HFpEF/Moderate pHTN (HFpEF) ([facility])  Assessment & Plan  Wt Readings from Last 3 Encounters:   09/07/24 103 kg (227 lb)   08/30/24 105 kg (231 lb)   08/22/24 105 kg (231 lb)     Echo (6/17/23): EF 50% with minimally reduced systolic function. Repeat echo pending  BNP elevated 1,067 on admission  5lb weight gain noted over the last 2 weeks  CXR without vascular congestion   SOB and weight improved. Transition back to home torsemide 20mg daily  Continue Imdur and jardience  Daily weights, I&Os    Peripheral artery disease ([facility])  Assessment & Plan  S/p left femoral bypass 8/30/24  Continue ASA, Plavix, statin  Outpatient follow up with Vascular Surgery     History of endovascular stent graft for abdominal aortic aneurysm (AAA)  Assessment & Plan  History noted    Type 2 diabetes mellitus with diabetic polyneuropathy, with long-term current use of insulin ([facility])  Assessment & Plan  Lab Results   Component Value Date    HGBA1C 7.0 (H) 09/03/2024       Recent Labs     09/04/24  1040 09/06/24  1755 09/06/24  2101 09/07/24  0719   POCGLU 181*  79 90 84         Blood Sugar Average: Last 72 hrs:  (P) [id number]  Continue Lantus 15U HS for BG <150  SSI with accu checks  Hypoglycemia protocol  Diabetic diet    Coronary artery disease involving native coronary artery of native heart without angina pectoris  Assessment & Plan  S/p CABG x 3 (2002) and s/p PCI with NANDINI to distal Lcx (2021)  Continue ASA, Plavix, BB, statin, ranexa   Cardiology follow up    Primary hypertension  Assessment & Plan  BP stable  Continue home amlodipine, clonidine, metoprolol  Monitor vitals per routine      Medical Problems       Resolved Problems  Date Reviewed: 9/7/2024   None       Discharging Physician / Practitioner: Silvia Steve PA-C  PCP: Frank Lombardi, DO  Admission Date:   Admission Orders (From admission, onward)       Ordered        09/06/24 1641  Place in Observation  Once                          Discharge Date: 09/07/24    Consultations During Hospital Stay:  None    Procedures Performed:   None    Significant Findings / Test Results:   CXR (9/6/24): No acute cardiopulmonary disease. Stable elevated right hemidiaphragm with overlying atelectasis.   CTA chest/abdomen/pelvis (9/6/24): No pulmonary embolism. Interval increase in size of a left lower lobe nodule, currently measuring up to 1.5 cm, previously 1.1 cm on June 2023 comparison. Other pulmonary nodules as described, some of which are difficult to compare to prior considering infectious infiltrate at that time. Based on current Fleischner Society 2017 Guidelines on incidental pulmonary nodule: Either PET/CT scan evaluation, tissue sampling or short-term interval follow-up non-contrast CT follow-up (initially in 3 months) may be considered appropriate. No focal consolidation. Small regions of subpleural reticulation/groundglass opacification may be sequela of prior infectious process. A new mild infectious infiltrate not excluded. 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 Follow-up  "chest CT recommended in 1 year. No acute abdominopelvic abnormality    Incidental Findings:   Yes, incidental notes completed   I reviewed the above mentioned incidental findings with the patient and/or family and they expressed understanding.    Test Results Pending at Discharge (will require follow up):   None     Outpatient Tests Requested:  None    Complications:  None    Reason for Admission: SOB    Hospital Course:   Thomas Horne is a 79 y.o. male patient who originally presented to the hospital on 9/6/2024 due to  ongoing shortness of breath, nausea, poor appetite for the last 2 days since being discharged from the hospital. Patient was admitted for further treatment of SOB with ABX and lasix as well as cardiac monitoring. Patients infectious work up negative and ABX were stopped. Weight improved and SOB resolved. SOB likely in the setting of CHF. Patient's nausea also improved. He reports feeling well today and eager for discharge to home. All patient and family questions answered to the best of my ability.       Please see above list of diagnoses and related plan for additional information.     Condition at Discharge: stable    Discharge Day Visit / Exam:   Subjective:  Patient sitting upright in bed this morning under no acute distress. Patient reports feeling much better and denies any further SOB or nausea. Eager for discharge home today.    Vitals: Blood Pressure: 133/61 (09/07/24 0905)  Pulse: 59 (09/07/24 0905)  Temperature: 97.6 °F (36.4 °C) (09/07/24 0805)  Temp Source: Tympanic (09/06/24 1332)  Respirations: 15 (09/07/24 0805)  Height: 6' 2\" (188 cm) (09/07/24 0905)  Weight - Scale: 103 kg (227 lb) (09/07/24 0905)  SpO2: 93 % (09/07/24 0805)  Exam:   Physical Exam  Vitals and nursing note reviewed.   Constitutional:       General: He is not in acute distress.     Appearance: He is well-developed.   HENT:      Head: Normocephalic and atraumatic.   Eyes:      Conjunctiva/sclera: Conjunctivae normal. "   Cardiovascular:      Rate and Rhythm: Normal rate and regular rhythm.      Heart sounds: No murmur heard.  Pulmonary:      Effort: Pulmonary effort is normal. No respiratory distress.      Breath sounds: Normal breath sounds.   Abdominal:      Palpations: Abdomen is soft.      Tenderness: There is no abdominal tenderness.   Musculoskeletal:         General: No swelling.      Cervical back: Neck supple.   Feet:      Comments: Left foot ulcer, no evidence of infection  Skin:     General: Skin is warm and dry.      Capillary Refill: Capillary refill takes less than 2 seconds.   Neurological:      Mental Status: He is alert.   Psychiatric:         Mood and Affect: Mood normal.          Discussion with Family: Updated  (daughter) via phone.    Discharge instructions/Information to patient and family:   See after visit summary for information provided to patient and family.      Provisions for Follow-Up Care:  See after visit summary for information related to follow-up care and any pertinent home health orders.      Mobility at time of Discharge:   Basic Mobility Inpatient Raw Score: 14  JH-HLM Goal: 4: Move to chair/commode  JH-HLM Achieved: 4: Move to chair/commode  HLM Goal achieved. Continue to encourage appropriate mobility.     Disposition:   Home    Planned Readmission: None     Discharge Statement:  I spent 55 minutes discharging the patient. This time was spent on the day of discharge. I had direct contact with the patient on the day of discharge. Greater than 50% of the total time was spent examining patient, answering all patient questions, arranging and discussing plan of care with patient as well as directly providing post-discharge instructions.  Additional time then spent on discharge activities.    Discharge Medications:  See after visit summary for reconciled discharge medications provided to patient and/or family.      **Please Note: This note may have been constructed using a voice  recognition system**

## 2024-09-07 NOTE — UTILIZATION REVIEW
Initial Clinical Review    Admission: Date/Time/Statement:   Admission Orders (From admission, onward)       Ordered        09/06/24 1641  Place in Observation  Once                          Orders Placed This Encounter   Procedures    Place in Observation     Standing Status:   Standing     Number of Occurrences:   1     Order Specific Question:   Level of Care     Answer:   Med Surg [16]     ED Arrival Information       Expected   9/6/2024     Arrival   9/6/2024 13:18    Acuity   Emergent              Means of arrival   Ambulance    Escorted by   [facility] ([facility])    Service   Hospitalist    Admission type   Emergency              Arrival complaint   Shortness of Breath, severe nausea, dizziness - s/p left lower extremity above knee popliteal to below knee popliteal artery bypass with PTFE graft (Left: Leg Upper) on 8/30.             Chief Complaint   Patient presents with    Shortness of Breath     Pt arrives via ALS c/o SOB and nausea since yesterday morning. Per EMS pt had multiple PVCs, nonperfusing en route to ED. Pt reports baseline HR in 50s. Denies palpitations. Surgery to left leg on 8/30/24. No c/o pain at site of surgery.        Initial Presentation:   79 yom to ER from home via EMS for SOB x 2 days. Patient had a recent left femoral bypass procedure done on 8/30/2024 and was discharged home on 9/4/24. Hx hypertension, coronary artery disease, peripheral arterial disease, CHF, CKD, diabetes. Presents with staples from recent bypass on the LT LE proximal and distal. Also with 2 wounds noted in the LT foot area. See images below. Admission CTA: No pulmonary embolism. Interval increase in size of a left lower lobe nodule, currently measuring up to 1.5 cm, previously 1.1 cm on June 2023 comparison. Recommending 3 month interval follow up CT chest. No focal consolidation. Small regions of subpleural reticulation/groundglass opacification may be sequela of prior infectious process. A new mild infectious  infiltrate not excluded. Labs: WBC 11.71, Hgb 10.8, , alb 3.3, elevated troponin, d-dimer 2.02, BNP 1067, u/a: spec grav <1.005, +gluc.  Placed in observation status for SOB. Started on IVABT, cultures pending.                             ED Triage Vitals   Temperature Pulse Respirations Blood Pressure SpO2 Pain Score   09/06/24 1332 09/06/24 1332 09/06/24 1332 09/06/24 1332 09/06/24 1331 09/06/24 1820   97.9 °F (36.6 °C) 60 20 139/66 96 % No Pain     Weight (last 2 days)       Date/Time Weight    09/06/24 1736 106 (234)    09/06/24 1332 110 (241.84)            Vital Signs (last 3 days)       Date/Time Temp Pulse Resp BP MAP (mmHg) SpO2 O2 Device Patient Position - Orthostatic VS Pain    09/07/24 05:15:25 -- 54 18 131/57 82 94 % -- -- --    09/07/24 0100 -- 50 -- -- -- 95 % None (Room air) -- --    09/06/24 2300 -- 50 -- 119/54 76 92 % None (Room air) -- --    09/06/24 22:50:29 97.5 °F (36.4 °C) 55 18 119/54 76 94 % -- -- --    09/06/24 21:19:27 -- 46 -- 121/57 78 93 % -- -- --    09/06/24 2119 -- 46 -- 121/57 -- -- -- -- --    09/06/24 19:42:21 97.5 °F (36.4 °C) 47 22 131/67 88 96 % None (Room air) -- No Pain    09/06/24 19:09:53 98.3 °F (36.8 °C) 46 19 109/51 70 93 % -- -- --    09/06/24 18:48:04 -- 51 20 116/51 73 94 % -- -- --    09/06/24 1820 98 °F (36.7 °C) 57 -- -- -- -- None (Room air) -- No Pain    09/06/24 1736 98 °F (36.7 °C) 57 19 133/56 82 96 % None (Room air) -- --    09/06/24 1500 -- 58 20 139/60 86 94 % None (Room air) Lying --    09/06/24 1334 -- -- -- -- -- -- None (Room air) -- --    09/06/24 1332 97.9 °F (36.6 °C) 60 20 139/66 -- 96 % None (Room air) Lying --    09/06/24 1331 -- -- -- -- -- 96 % -- -- --              Pertinent Labs/Diagnostic Test Results:   Radiology:  PE Study with CT Abdomen and Pelvis with contrast   Final Interpretation by Prashanth Estrella MD (09/06 1556)      No pulmonary embolism.      Interval increase in size of a left lower lobe nodule, currently measuring  up to 1.5 cm, previously 1.1 cm on June 2023 comparison.   Other pulmonary nodules as described, some of which are difficult to compare to prior considering infectious infiltrate at that time.   Based on current Fleischner Society 2017 Guidelines on incidental pulmonary nodule:   Either PET/CT scan evaluation, tissue sampling or short-term interval follow-up non-contrast CT follow-up (initially in 3 months) may be considered appropriate.      No focal consolidation.   Small regions of subpleural reticulation/groundglass opacification may be sequela of prior infectious process.   A new mild infectious infiltrate not excluded.      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   Follow-up chest CT recommended in 1 year.      No acute abdominopelvic abnormality.      The study was marked in EPIC for immediate notification.         Workstation performed: [id number]         XR chest 1 view portable   Final Interpretation by Enzo Garg MD (09/06 1605)      No acute cardiopulmonary disease. Stable elevated right hemidiaphragm with overlying atelectasis.            Workstation performed: [id number]           Cardiology:  No orders to display     GI:  No orders to display       Results from last 7 days   Lab Units 09/06/24  1339   SARS-COV-2  Negative     Results from last 7 days   Lab Units 09/06/24  1339 09/03/24  0351 09/02/24  0451   WBC Thousand/uL 11.71* 8.15 9.77   HEMOGLOBIN g/dL 10.8* 9.5* 9.9*   HEMATOCRIT % 34.0* 30.4* 31.7*   PLATELETS Thousands/uL 414* 259 236   TOTAL NEUT ABS Thousands/µL 9.56*  --   --          Results from last 7 days   Lab Units 09/06/24  1339 09/04/24  0541 09/03/24  1622 09/03/24  0351 09/02/24  0451   SODIUM mmol/L 137 140 139 139 140   POTASSIUM mmol/L 4.0 3.7 3.6 4.0 3.3*   CHLORIDE mmol/L 103 103 102 102 103   CO2 mmol/L 26 29 30 31 29   ANION GAP mmol/L 8 8 7 6 8   BUN mg/dL 18 19 24 24 22   CREATININE mg/dL 1.07 0.99 1.33* 1.03 1.03   EGFR ml/min/1.73sq m 64  "71 49 67 67   CALCIUM mg/dL 9.1 8.6 8.5 8.7 8.7   MAGNESIUM mg/dL  --   --  1.8*  --  1.7*   PHOSPHORUS mg/dL  --   --  1.7*  --   --      Results from last 7 days   Lab Units 09/06/24  1339   AST U/L 23   ALT U/L 18   ALK PHOS U/L 37   TOTAL PROTEIN g/dL 6.8   ALBUMIN g/dL 3.3*   TOTAL BILIRUBIN mg/dL 0.69     Results from last 7 days   Lab Units 09/06/24  2101 09/06/24  1755 09/04/24  1040 09/04/24  0630 09/03/24  2041 09/03/24  1538 09/03/24  1058 09/03/24  0615 09/02/24  2040 09/02/24  1516 09/02/24  1034 09/02/24  0627   POC GLUCOSE mg/dl 90 79 181* 124 197* 183* 215* 126 198* 167* 186* 128     Results from last 7 days   Lab Units 09/06/24  1339 09/04/24  0541 09/03/24  1622 09/03/24  0351 09/02/24  0451 09/01/24  0551   GLUCOSE RANDOM mg/dL 132 113 175* 107 109 72         Results from last 7 days   Lab Units 09/03/24  1622   HEMOGLOBIN A1C % 7.0*   EAG mg/dl 154     No results found for: \"BETA-HYDROXYBUTYRATE\"                   Results from last 7 days   Lab Units 09/06/24  2112 09/06/24  1543 09/06/24  1339   HS TNI 0HR ng/L  --   --  103*   HS TNI 2HR ng/L  --  85*  --    HSTNI D2 ng/L  --  -18  --    HS TNI 4HR ng/L 98*  --   --    HSTNI D4 ng/L -5  --   --      Results from last 7 days   Lab Units 09/06/24  1339   D-DIMER QUANTITATIVE ug/ml FEU 2.02*     Results from last 7 days   Lab Units 09/06/24  1339   PROTIME seconds 15.1*   INR  1.13   PTT seconds 30         Results from last 7 days   Lab Units 09/06/24  1339   PROCALCITONIN ng/ml 0.09                 Results from last 7 days   Lab Units 09/06/24  1339   BNP pg/mL 1,067*                     Results from last 7 days   Lab Units 09/06/24  1339   LIPASE u/L 34                 Results from last 7 days   Lab Units 09/06/24  1452   CLARITY UA  Clear   COLOR UA  Yellow   SPEC GRAV UA  <1.005*   PH UA  5.5   GLUCOSE UA mg/dl 1000 (1%)*   KETONES UA mg/dl Negative   BLOOD UA  Negative   PROTEIN UA mg/dl Negative   NITRITE UA  Negative   BILIRUBIN UA  " Negative   UROBILINOGEN UA (BE) mg/dl <2.0   LEUKOCYTES UA  Negative     Results from last 7 days   Lab Units 09/06/24  1339   INFLUENZA A PCR  Negative   INFLUENZA B PCR  Negative   RSV PCR  Negative                                               ED Treatment-Medication Administration from 09/06/2024 1224 to 09/06/2024 1728         Date/Time Order Dose Route Action     09/06/2024 1344 sodium chloride 0.9 % bolus 500 mL 500 mL Intravenous New Bag     09/06/2024 1345 ondansetron (ZOFRAN) injection 4 mg 4 mg Intravenous Given     09/06/2024 1420 iohexol (OMNIPAQUE) 350 MG/ML injection (MULTI-DOSE) 100 mL 100 mL Intravenous Given     09/06/2024 1716 ondansetron (ZOFRAN) injection 4 mg 4 mg Intravenous Given     09/06/2024 1716 aspirin chewable tablet 324 mg 324 mg Oral Given     09/06/2024 1719 magnesium sulfate 2 g/50 mL IVPB (premix) 2 g 2 g Intravenous New Bag            Past Medical History:   Diagnosis Date    Anemia     CAD (coronary artery disease)     Callus     Cancer (HCC)     prostate    CHF (congestive heart failure) ([facility])     Chronic kidney disease     Clotting disorder (HCC)     Coronary artery disease 1988    Deep vein thrombosis (HCC)     Diabetes mellitus (HCC)     Difficulty walking     Duodenal ulcer     Heart disease 1988    Hyperlipidemia     Hypertension     Myocardial infarction (HCC)     Neuropathy     Bilateral feet    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)     Plantar fasciitis     Sleep apnea     Could not tolerate CPAP     Present on Admission:   Primary hypertension   Coronary artery disease involving native coronary artery of native heart without angina pectoris   Peripheral artery disease (HCC)   Chronic HFpEF/Moderate pHTN (HFpEF) ([facility])   Stage 3b chronic kidney disease ([facility])      Admitting Diagnosis: Dyspnea [R06.00]  SOB (shortness of breath) [R06.02]  Elevated troponin [R79.89]  Age/Sex: 79 y.o. male  Admission Orders:  Cont pulse ox  Scd/foot pumps  Telemetry  Accuchecks    Scheduled  Medications:  amLODIPine, 5 mg, Oral, Daily  aspirin, 81 mg, Oral, Daily  atorvastatin, 40 mg, Oral, Daily  cefTRIAXone, 1,000 mg, Intravenous, Q24H  cloNIDine, 0.1 mg, Oral, Q12H  clopidogrel, 75 mg, Oral, Daily  DULoxetine, 30 mg, Oral, Daily  enoxaparin, 40 mg, Subcutaneous, Daily  fenofibrate, 145 mg, Oral, Daily  insulin glargine, 15 Units, Subcutaneous, HS  insulin lispro, 1-6 Units, Subcutaneous, 4x Daily (AC & HS)  isosorbide mononitrate, 90 mg, Oral, Daily  metoprolol tartrate, 25 mg, Oral, Q12H CONSTANTINE  pantoprazole, 40 mg, Intravenous, Q12H CONSTANTINE  pentoxifylline, 400 mg, Oral, TID With Meals  pregabalin, 100 mg, Oral, TID  ranolazine, 1,000 mg, Oral, BID    PRN Meds:  acetaminophen, 650 mg, Oral, Q6H PRN  ondansetron, 4 mg, Intravenous, Q6H PRN    Network Utilization Review Department  ATTENTION: Please call with any questions or concerns to [phone] and carefully listen to the prompts so that you are directed to the right person. All voicemails are confidential.   For Discharge needs, contact Care Management DC Support Team at [phone] opt. 2  Send all requests for admission clinical reviews, approved or denied determinations and any other requests to dedicated fax number below belonging to the campus where the patient is receiving treatment. List of dedicated fax numbers for the Facilities:  FACILITY NAME UR FAX NUMBER   ADMISSION DENIALS (Administrative/Medical Necessity) [phone]   DISCHARGE SUPPORT TEAM (NETWORK) [phone]   PARENT CHILD HEALTH (Maternity/NICU/Pediatrics) [phone]   [facility] [phone]   [facility] [phone]   [facility] [phone]   [facility] [phone]   [facility] [phone]   [facility] [phone]   [facility] [phone]   [facility]  [place] [phone]   [facility] [phone]   [facility] [phone]   [facility] [phone]   [place] [phone]

## 2024-09-08 LAB
ATRIAL RATE: 63 BPM
P AXIS: 54 DEGREES
PR INTERVAL: 218 MS
QRS AXIS: -54 DEGREES
QRSD INTERVAL: 120 MS
QT INTERVAL: 470 MS
QTC INTERVAL: 480 MS
T WAVE AXIS: 128 DEGREES
VENTRICULAR RATE: 63 BPM

## 2024-09-08 PROCEDURE — 93010 ELECTROCARDIOGRAM REPORT: CPT | Performed by: INTERNAL MEDICINE

## 2024-09-09 ENCOUNTER — TELEPHONE (OUTPATIENT)
Dept: VASCULAR SURGERY | Facility: CLINIC | Age: 79
End: 2024-09-09

## 2024-09-09 ENCOUNTER — OFFICE VISIT (OUTPATIENT)
Dept: WOUND CARE | Facility: HOSPITAL | Age: 79
End: 2024-09-09
Payer: COMMERCIAL

## 2024-09-09 VITALS
RESPIRATION RATE: 16 BRPM | BODY MASS INDEX: 29.65 KG/M2 | WEIGHT: 231 LBS | HEART RATE: 58 BPM | OXYGEN SATURATION: 94 % | SYSTOLIC BLOOD PRESSURE: 135 MMHG | DIASTOLIC BLOOD PRESSURE: 77 MMHG | TEMPERATURE: 98.8 F | HEIGHT: 74 IN

## 2024-09-09 VITALS
TEMPERATURE: 96.9 F | RESPIRATION RATE: 18 BRPM | DIASTOLIC BLOOD PRESSURE: 67 MMHG | HEART RATE: 56 BPM | SYSTOLIC BLOOD PRESSURE: 136 MMHG

## 2024-09-09 DIAGNOSIS — L97.522 DIABETIC ULCER OF LEFT FOOT ASSOCIATED WITH TYPE 2 DIABETES MELLITUS, WITH FAT LAYER EXPOSED, UNSPECIFIED PART OF FOOT (HCC): Primary | ICD-10-CM

## 2024-09-09 DIAGNOSIS — E11.51 TYPE 2 DIABETES MELLITUS WITH DIABETIC PERIPHERAL ANGIOPATHY WITHOUT GANGRENE, WITH LONG-TERM CURRENT USE OF INSULIN (HCC): ICD-10-CM

## 2024-09-09 DIAGNOSIS — I73.9 PERIPHERAL ARTERY DISEASE (HCC): ICD-10-CM

## 2024-09-09 DIAGNOSIS — E11.621 DIABETIC ULCER OF LEFT FOOT ASSOCIATED WITH TYPE 2 DIABETES MELLITUS, WITH FAT LAYER EXPOSED, UNSPECIFIED PART OF FOOT (HCC): Primary | ICD-10-CM

## 2024-09-09 DIAGNOSIS — Z79.4 TYPE 2 DIABETES MELLITUS WITH DIABETIC PERIPHERAL ANGIOPATHY WITHOUT GANGRENE, WITH LONG-TERM CURRENT USE OF INSULIN (HCC): ICD-10-CM

## 2024-09-09 PROCEDURE — 97597 DBRDMT OPN WND 1ST 20 CM/<: CPT | Performed by: STUDENT IN AN ORGANIZED HEALTH CARE EDUCATION/TRAINING PROGRAM

## 2024-09-09 RX ORDER — LIDOCAINE 40 MG/G
CREAM TOPICAL ONCE
Status: COMPLETED | OUTPATIENT
Start: 2024-09-09 | End: 2024-09-09

## 2024-09-09 RX ADMIN — LIDOCAINE 1 APPLICATION: 40 CREAM TOPICAL at 10:39

## 2024-09-09 NOTE — TELEPHONE ENCOUNTER
Vascular Nurse Navigator Post Op Call    Procedure:  Left distal superficial femoral artery bypass to below knee popliteal artery bypass with 6mm ringed PTFE    Date of Procedure: 8/30/24    Surgeon: MD Dr. Jos Diallo MD,    Dr. Ezequiel Pereira, DO     Discharge Date: 9/4/24    Discharge Disposition: Home    Readmitted to [facility]:  9/6/24 to 9/7/24 and discharged to home    Leg Weakness?: No    Leg Swelling?: No    Leg Numbness?: No    Chest Pain?: No    Shortness of Breath?: No    Orthopnea?: No    Anticoagulation pt was discharged on post op?: Aspirin and Clopidogrel (Plavix)    Statin pt was discharged on post op?:  Lipitor (atorvastatin)    Bleeding?: No    Uncontrolled Pain?: No    Incision Concerns?: No    Fever or Chills?: No      Reviewed discharge instructions and incision care with patient.      NEXT OFFICE VISIT SCHEDULED:  9/10/24 at 4 pm with APOLLO Lopez [facility] Confirmed?: Yes      Any further questions/concerns?  Patient stated that he is doing good since discharge.  He stated that he has swelling in his leg and has been elevating it as much as he is able to.  Reviewed incision care with him - wash daily with soap and water.  Reviewed discharge medications - Aspirin and Plavix.  All questions answered.  No concerns expressed at this time.

## 2024-09-09 NOTE — PROGRESS NOTES
Wound Procedure Treatment    Performed by: Nadege Pinzon RN  Authorized by: David Frye MD    Associated wounds:   Wound 08/15/24 Diabetic Ulcer Heel Left  Wound 08/15/24 Diabetic Ulcer Plantar Left;Lateral  Wound 08/15/24 Diabetic Ulcer Toe D2, second Anterior;Left  Wound cleansed with:  Dakin's 0.125%  Applied to periwound:  Moisture lotion  Applied primary dressing:  Silver and Polymem foam  Applied secondary dressing:  ABD and Gauze  Dressing secured with:  Ender and Tape

## 2024-09-09 NOTE — PATIENT INSTRUCTIONS
Orders Placed This Encounter   Procedures    Wound cleansing and dressings     Left foot wounds:     Hold santyl, do not discard. Store per manufacturers recommendations    Wash your hands with soap and water.  Remove old dressing, discard into plastic bag and place in trash.  Cleanse the wound with dakins moistened gauze prior to applying a clean dressing. Do not use tissue or cotton balls. Do not scrub the wound. Pat dry using gauze.  Shower no, unless able to keep wounds dry.     Apply lotion to skin surrounding wound  Apply polymem max ag to the open wound.    Cover with abd (gauze to toe wound)  Secure with rolled gauze and tape    Change dressing 3 times weekly      Off-loading Instructions:    Wear off-loading device as directed by your physician (michael alcala). Put on immediately when rising in the morning and remove when going to bed and Turn every 2 hours. Avoid position directing pressure to Wound site. Limit side lying to 30 degree tilt. Limit the head of bed elevation to 30 degrees. Limit ambulation to only necessity for activities of daily living.     Wound infection:  If you have signs of infection please call the wound center.  If the wound center is closed- please go to the Emergency department.  Some signs of infection:  fever, chills, increased redness, red streaks, increase in pain, increased drainage.  Drainage with an odor, Change in drainage color: white/milky/green/tan/yellow,  an increase in swelling, chest pain and/or shortness of breath.     Protein: Eat protein with each meal to promote healing.  Examples of protein are fish, meat, chicken, nuts, peanut butter, eggs, lentils, edamame or a protein shake.    [facility] for wound care     Standing Status:   Future     Standing Expiration Date:   9/16/2024

## 2024-09-09 NOTE — PROGRESS NOTES
"Patient ID: Thomas Horne is a 79 y.o. male Date of Birth 1945     Chief Complaint  No chief complaint on file.      Allergies  Lisinopril    Assessment:   Diagnoses and all orders for this visit:    Diabetic ulcer of left foot associated with type 2 diabetes mellitus, with fat layer exposed, unspecified part of foot ([facility])  -     lidocaine (LMX) 4 % cream  -     Wound cleansing and dressings; Future  -     Wound Procedure Treatment  -     Debridement  -     Debridement  -     Debridement    Peripheral artery disease ([facility])    Type 2 diabetes mellitus with diabetic peripheral angiopathy without gangrene, with long-term current use of insulin ([facility])              Debridement   Wound 08/15/24 Diabetic Ulcer Heel Left    Universal Protocol:  procedure performed by consultantConsent: Verbal consent obtained.  Risks and benefits: risks, benefits and alternatives were discussed  Consent given by: patient  Time out: Immediately prior to procedure a \"time out\" was called to verify the correct patient, procedure, equipment, support staff and site/side marked as required.  Patient identity confirmed: verbally with patient    Debridement Details  Performed by: physician  Debridement type: selective  Pain control: lidocaine 4%      Post-debridement measurements  Length (cm): 2.5  Width (cm): 3.2  Depth (cm): 0.1  Percent debrided: 100%  Surface Area (cm^2): 8  Area Debrided (cm^2): 8  Volume (cm^3): 0.8    Devitalized tissue debrided: biofilm, exudate, fibrin, necrotic debris and slough  Instrument(s) utilized: curette  Bleeding: small  Hemostasis obtained with: pressure  Procedural pain (0-10): 1  Post-procedural pain: 0   Response to treatment: procedure was tolerated well    Debridement   Wound 08/15/24 Diabetic Ulcer Plantar Left;Lateral    Universal Protocol:  procedure performed by consultantConsent: Verbal consent obtained.  Risks and benefits: risks, benefits and alternatives were discussed  Consent given by: " "patient  Time out: Immediately prior to procedure a \"time out\" was called to verify the correct patient, procedure, equipment, support staff and site/side marked as required.  Patient identity confirmed: verbally with patient    Debridement Details  Performed by: physician  Debridement type: selective  Pain control: lidocaine 4%      Post-debridement measurements  Length (cm): 1.1  Width (cm): 1.2  Depth (cm): 0.1  Percent debrided: 100%  Surface Area (cm^2): 1.32  Area Debrided (cm^2): 1.32  Volume (cm^3): 0.13    Devitalized tissue debrided: biofilm, exudate, fibrin, necrotic debris and slough  Instrument(s) utilized: curette  Bleeding: small  Hemostasis obtained with: pressure  Procedural pain (0-10): 1  Post-procedural pain: 0   Response to treatment: procedure was tolerated well    Debridement   Wound 08/15/24 Diabetic Ulcer Toe D2, second Anterior;Left    Universal Protocol:  procedure performed by consultantConsent: Verbal consent obtained.  Risks and benefits: risks, benefits and alternatives were discussed  Consent given by: patient  Time out: Immediately prior to procedure a \"time out\" was called to verify the correct patient, procedure, equipment, support staff and site/side marked as required.  Patient identity confirmed: verbally with patient    Debridement Details  Performed by: physician  Debridement type: selective  Pain control: lidocaine 4%      Post-debridement measurements  Length (cm): 2.1  Width (cm): 1  Depth (cm): 0.2  Percent debrided: 90%  Surface Area (cm^2): 2.1  Area Debrided (cm^2): 1.89  Volume (cm^3): 0.42    Devitalized tissue debrided: biofilm, exudate, fibrin and necrotic debris  Instrument(s) utilized: curette  Bleeding: small  Hemostasis obtained with: pressure  Procedural pain (0-10): 1  Post-procedural pain: 0   Response to treatment: procedure was tolerated well        Plan:   It was a pleasure to see Thomas Horne for wound care follow up today  Selective debridement performed " today as above  Wound is not improving   Continue plan of care as noted below with dakins,  polymem max ag, darco shoe. VNA coming to home 3x/weel  Follow-up from femoral bypass tomorrow with vascular surgery.  Once patient is cleared by vascular surgery incisions healed, may be candidate for TCC if needed  No signs or symptoms of infection today. Patient understands that if any signs of infection start (such as increased redness, drainage, pain, fever, chills, diaphoresis), they should call our office or proceed to the ER or Urgent Care.  Patient should continue a high protein diet to facilitate wound healing  Patient is advised to not submerge wound or leave wound open to air.  Follow up in 1 weeks  Given the multi-factorial nature of wound care, additional time was taken to review patient's treatment plan with other specialties and most recent pertinent lab work and imaging.   All plans of care discussed with patient at bedside who verbalized understanding with treatment plan.    Wound 08/15/24 Diabetic Ulcer Heel Left (Active)   Enter Sumner score: Sumner Grade 3: Deep abscess, OM, or joint sepsis 09/09/24 1035   Wound Image Images linked 09/09/24 1035   Wound Description Yellow;White;Slough;Pink;Black;Brown 09/09/24 1035   Clara-wound Assessment Fragile 09/09/24 1035   Wound Length (cm) 2.5 cm 09/09/24 1035   Wound Width (cm) 3.2 cm 09/09/24 1035   Wound Depth (cm) 0.1 cm 09/09/24 1035   Wound Surface Area (cm^2) 8 cm^2 09/09/24 1035   Wound Volume (cm^3) 0.8 cm^3 09/09/24 1035   Calculated Wound Volume (cm^3) 0.8 cm^3 09/09/24 1035   Change in Wound Size % -344.44 09/09/24 1035   Drainage Amount Moderate 09/09/24 1035   Drainage Description Serosanguineous;Yellow 09/09/24 1035   Non-staged Wound Description Full thickness 09/09/24 1035   Dressing Status Intact 09/09/24 1035       Wound 08/15/24 Diabetic Ulcer Plantar Left;Lateral (Active)   Enter Sumner score: Sumner Grade 2: Deep ulcer extended to ligament,  tendon, joint capsule, bone, or deep fascia without abscess or osteomyelitis (OM) 09/09/24 1036   Wound Image Images linked 09/09/24 1036   Wound Description Yellow;Pink;Slough;Pale 09/09/24 1036   Clara-wound Assessment Pink 09/09/24 1036   Wound Length (cm) 1.1 cm 09/09/24 1036   Wound Width (cm) 1.2 cm 09/09/24 1036   Wound Depth (cm) 0.1 cm 09/09/24 1036   Wound Surface Area (cm^2) 1.32 cm^2 09/09/24 1036   Wound Volume (cm^3) 0.132 cm^3 09/09/24 1036   Calculated Wound Volume (cm^3) 0.13 cm^3 09/09/24 1036   Change in Wound Size % -62.5 09/09/24 1036   Drainage Amount Small 09/09/24 1036   Drainage Description Serosanguineous;Yellow 09/09/24 1036   Non-staged Wound Description Full thickness 09/09/24 1036   Dressing Status Intact 09/09/24 1036       Wound 08/15/24 Diabetic Ulcer Toe D2, second Anterior;Left (Active)   Enter Sumner score: Sumner Grade 3: Deep abscess, OM, or joint sepsis 09/09/24 1036   Wound Image Images linked 09/09/24 1036   Wound Description Black;Brown;Yellow 09/09/24 1036   Clara-wound Assessment Pink 09/09/24 1036   Wound Length (cm) 2.3 cm 09/09/24 1036   Wound Width (cm) 1 cm 09/09/24 1036   Wound Depth (cm) 0.1 cm 09/09/24 1036   Wound Surface Area (cm^2) 2.3 cm^2 09/09/24 1036   Wound Volume (cm^3) 0.23 cm^3 09/09/24 1036   Calculated Wound Volume (cm^3) 0.23 cm^3 09/09/24 1036   Change in Wound Size % 45.24 09/09/24 1036   Drainage Amount Moderate 09/09/24 1036   Drainage Description Serosanguineous;Yellow 09/09/24 1036   Non-staged Wound Description Full thickness 09/09/24 1036   Dressing Status Intact 09/09/24 1036       Wound 08/15/24 Diabetic Ulcer Heel Left (Active)   Date First Assessed/Time First Assessed: 08/15/24 0943   Primary Wound Type: Diabetic Ulcer  Location: Heel  Wound Location Orientation: Left       Wound 08/15/24 Diabetic Ulcer Plantar Left;Lateral (Active)   Date First Assessed/Time First Assessed: 08/15/24 0943   Primary Wound Type: Diabetic Ulcer  Location:  Plantar  Wound Location Orientation: Left;Lateral       Wound 08/15/24 Diabetic Ulcer Toe D2, second Anterior;Left (Active)   Date First Assessed/Time First Assessed: 08/15/24 0943   Primary Wound Type: Diabetic Ulcer  Location: Toe D2, second  Wound Location Orientation: Anterior;Left       Wound 08/30/24 Leg Left (Active)   Date First Assessed/Time First Assessed: 08/30/24 1124   Location: Leg  Wound Location Orientation: Left  Wound Description (Comments): UPPER LEG AND LOWER LEG- MEPILEX  Incision's 1st Dressing: DRESSING MEPILEX AG BORDER POST-OP 4 X 8 IN (x2)       Wound 09/06/24 Pretibial Left;Proximal (Active)   Date First Assessed/Time First Assessed: 09/06/24 1751   Location: Pretibial  Wound Location Orientation: Left;Proximal       Wound 09/06/24 Thigh Anterior;Left (Active)   Date First Assessed/Time First Assessed: 09/06/24 1752   Location: Thigh  Wound Location Orientation: Anterior;Left       [REMOVED] Wound 12/21/23 Diabetic Ulcer Ankle Posterior;Right (Removed)   Resolved Date: 08/15/24  Date First Assessed/Time First Assessed: 12/21/23 0954   Primary Wound Type: Diabetic Ulcer  Location: Ankle  Wound Location Orientation: Posterior;Right  Wound Description (Comments): MACDONALD 2  Wound Outcome: Unknown (No long...       [REMOVED] Wound 08/07/24 Groin Left (Removed)   Resolved Date: 08/22/24  Date First Assessed/Time First Assessed: 08/07/24 0927   Location: Groin  Wound Location Orientation: Left  Incision's 1st Dressing: ADHESIVE SKIN HIGH VISCOSITY EXOFIN PRECISION PEN (x0)  Wound Outcome: Unknown (No longer pre...       [REMOVED] Wound 09/06/24 Ankle Anterior;Left (Removed)   Resolved Date: 09/09/24  Date First Assessed/Time First Assessed: 09/06/24 1804   Location: Ankle  Wound Location Orientation: Anterior;Left  Wound Outcome: Unknown (No longer present)       [REMOVED] Wound 09/06/24 Toe D2, second Anterior;Left (Removed)   Resolved Date: 09/09/24  Date First Assessed/Time First Assessed:  09/06/24 1804   Location: Toe D2, second  Wound Location Orientation: Anterior;Left  Wound Outcome: Unknown (No longer present)       [REMOVED] Wound 09/06/24 Pedal Anterior;Left (Removed)   Resolved Date: 09/09/24  Date First Assessed/Time First Assessed: 09/06/24 1805   Location: Pedal  Wound Location Orientation: Anterior;Left  Wound Outcome: Unknown (No longer present)       Subjective:      .    9/9/24: Since his last appointment patient was hospitalized for femoral bypass of the left lower extremity.  Notes that since his surgery he is significantly less of breast pain and is overall happy with his ambulation.  Was advised by the inpatient team to apply Santyl to wounds of left foot however notices considerable more exudate.  Seeing podiatry as well who recommended Darco shoe for offloading at this time.  No symptoms of infection today including fever chills diaphoresis.  Has first follow-up with vascular surgery since the intervention scheduled for tomorrow.    8/22/24: Patient having difficulty coordinating a date for VNA to come as he only wants to be seen Saturday.  In the meantime his daughter is taking care of local wound dressing changes.  No symptoms of infection today including fever chills diaphoresis.  Does report more pain when he is weightbearing on the foot     8/15/24: Consult - Thomas is a pleasant 79-year-old male with a past medical history of hypertension, coronary artery disease s/p CABG, peripheral arterial disease with history of intervention, HfPEF G2DD, type 2 diabetes mellitus with most recent A1c 7.9% 4 months ago, history of DVT, CKD stage IIIb, and prior tobacco use here today for wound care consult.  Patient is known to wound management for treatment of diabetic foot ulcers which have healed in the past.  He is referred today by podiatrist Dr. Sundar Arango.  Per chart review patient was being followed for 3 open wounds of the left foot.  His most recent appointment on 8 August patient  also complained of increasing rest pain throughout the leg.  He was given short prescription of Percocet as well as doxycycline and given stat referral for IR intervention.  Also given referral to wound management that day given deterioration of the wounds.  Unfortunately per operative notes, IR was unable to revascularize the left lower extremity.  Thomas is scheduled for bypass on September 17.  He was also given referral for interventional spine and pain to take over management of diabetic neuropathy.  Pain physician gave patient weaning protocol for gabapentin with plan [facility].  Presents without his Aircast today.  Denies any symptoms of infection including fever chills diaphoresis.  States that his biggest complaint is that of pain.     LEADs 7/29/24  RIGHT LOWER LIMB:  Evaluation shows severe lower extremity arterial occlusive disease with a  50-75% stenosis in the deep femoral artery. There is also tibio-peroneal  arterial occlusive disease. The femoral to popliteal artery bypass graft could  not be identified suggesting occlusion.  Ankle/Brachial index: 0.48 and in the ischemic range. Prior  0.73.  PVR/ PPG tracings are dampened.  Metatarsal pressure: 80 mm Hg,  and prior non-compressible.  Great toe pressure of 20 mm Hg, below the healing range for a diabetic. Prior  31 mmHg.     LEFT LOWER LIMB:  Ankle/Brachial index: 0.48 which is in the ischemic disease category. (Prior:  0.38)  PVR/ PPG tracings are dampened.  Metatarsal pressure was not obtained secondary to severely diminished PPG  signal. Prior 38 mm Hg.  Great toe pressure was also not obtained. Prior 28 mm Hg,     Compared to previous study on 6-21-24 the occluded fem popliteal bypass graft  is a new finding.     XR left foot 7/10/24: No acute osseous abnormality. Moderate-sized calcaneal enthesoph             The following portions of the patient's history were reviewed and updated as appropriate: allergies, current medications, past  family history, past medical history, past social history, past surgical history, and problem list.    Review of Systems   Constitutional:  Negative for chills, diaphoresis and fever.   Skin:  Positive for wound.   All other systems reviewed and are negative.        Objective:       Wound 08/15/24 Diabetic Ulcer Heel Left (Active)   Enter Sumner score: Sumner Grade 3: Deep abscess, OM, or joint sepsis 09/09/24 1035   Wound Image Images linked 09/09/24 1035   Wound Description Yellow;White;Slough;Pink;Black;Brown 09/09/24 1035   Clara-wound Assessment Fragile 09/09/24 1035   Wound Length (cm) 2.5 cm 09/09/24 1035   Wound Width (cm) 3.2 cm 09/09/24 1035   Wound Depth (cm) 0.1 cm 09/09/24 1035   Wound Surface Area (cm^2) 8 cm^2 09/09/24 1035   Wound Volume (cm^3) 0.8 cm^3 09/09/24 1035   Calculated Wound Volume (cm^3) 0.8 cm^3 09/09/24 1035   Change in Wound Size % -344.44 09/09/24 1035   Drainage Amount Moderate 09/09/24 1035   Drainage Description Serosanguineous;Yellow 09/09/24 1035   Non-staged Wound Description Full thickness 09/09/24 1035   Dressing Status Intact 09/09/24 1035       Wound 08/15/24 Diabetic Ulcer Plantar Left;Lateral (Active)   Enter Sumner score: Sumner Grade 2: Deep ulcer extended to ligament, tendon, joint capsule, bone, or deep fascia without abscess or osteomyelitis (OM) 09/09/24 1036   Wound Image Images linked 09/09/24 1036   Wound Description Yellow;Pink;Slough;Pale 09/09/24 1036   Clara-wound Assessment Pink 09/09/24 1036   Wound Length (cm) 1.1 cm 09/09/24 1036   Wound Width (cm) 1.2 cm 09/09/24 1036   Wound Depth (cm) 0.1 cm 09/09/24 1036   Wound Surface Area (cm^2) 1.32 cm^2 09/09/24 1036   Wound Volume (cm^3) 0.132 cm^3 09/09/24 1036   Calculated Wound Volume (cm^3) 0.13 cm^3 09/09/24 1036   Change in Wound Size % -62.5 09/09/24 1036   Drainage Amount Small 09/09/24 1036   Drainage Description Serosanguineous;Yellow 09/09/24 1036   Non-staged Wound Description Full thickness 09/09/24  1036   Dressing Status Intact 09/09/24 1036       Wound 08/15/24 Diabetic Ulcer Toe D2, second Anterior;Left (Active)   Enter Sumner score: Sumner Grade 3: Deep abscess, OM, or joint sepsis 09/09/24 1036   Wound Image Images linked 09/09/24 1036   Wound Description Black;Brown;Yellow 09/09/24 1036   Clara-wound Assessment Pink 09/09/24 1036   Wound Length (cm) 2.3 cm 09/09/24 1036   Wound Width (cm) 1 cm 09/09/24 1036   Wound Depth (cm) 0.1 cm 09/09/24 1036   Wound Surface Area (cm^2) 2.3 cm^2 09/09/24 1036   Wound Volume (cm^3) 0.23 cm^3 09/09/24 1036   Calculated Wound Volume (cm^3) 0.23 cm^3 09/09/24 1036   Change in Wound Size % 45.24 09/09/24 1036   Drainage Amount Moderate 09/09/24 1036   Drainage Description Serosanguineous;Yellow 09/09/24 1036   Non-staged Wound Description Full thickness 09/09/24 1036   Dressing Status Intact 09/09/24 1036       /67   Pulse 56   Temp (!) 96.9 °F (36.1 °C)   Resp 18     Physical Exam  Vitals reviewed.   Constitutional:       Appearance: Normal appearance.   HENT:      Head: Normocephalic and atraumatic.   Eyes:      Extraocular Movements: Extraocular movements intact.   Pulmonary:      Effort: Pulmonary effort is normal.   Musculoskeletal:      Cervical back: Neck supple.   Skin:     Comments: Right toe wound similar in size to last exam.  Necrotic debris and fibrin deposition debrided today.    Significant amount of slough and fibrin deposition in the wound bed of the plantar left heel wound.  No overt signs of infections.  Slight malodor.    Lateral left foot wound similar in size to last exam.  Significant fibrin deposition and slough.      No wounds today probing to deep structure or bone   Neurological:      Mental Status: He is alert.   Psychiatric:         Mood and Affect: Mood normal.                     Wound Instructions:  Orders Placed This Encounter   Procedures    Wound cleansing and dressings     Left foot wounds:     Hold santyl, do not discard. Store  per manufacturers recommendations    Wash your hands with soap and water.  Remove old dressing, discard into plastic bag and place in trash.  Cleanse the wound with dakins moistened gauze prior to applying a clean dressing. Do not use tissue or cotton balls. Do not scrub the wound. Pat dry using gauze.  Shower no, unless able to keep wounds dry.     Apply lotion to skin surrounding wound  Apply polymem max ag to the open wound.    Cover with abd (gauze to toe wound)  Secure with rolled gauze and tape    Change dressing 3 times weekly      Off-loading Instructions:    Wear off-loading device as directed by your physician (michael alcala). Put on immediately when rising in the morning and remove when going to bed and Turn every 2 hours. Avoid position directing pressure to Wound site. Limit side lying to 30 degree tilt. Limit the head of bed elevation to 30 degrees. Limit ambulation to only necessity for activities of daily living.     Wound infection:  If you have signs of infection please call the wound center.  If the wound center is closed- please go to the Emergency department.  Some signs of infection:  fever, chills, increased redness, red streaks, increase in pain, increased drainage.  Drainage with an odor, Change in drainage color: white/milky/green/tan/yellow,  an increase in swelling, chest pain and/or shortness of breath.     Protein: Eat protein with each meal to promote healing.  Examples of protein are fish, meat, chicken, nuts, peanut butter, eggs, lentils, edamame or a protein shake.    [facility] for wound care     Standing Status:   Future     Standing Expiration Date:   9/16/2024    Wound Procedure Treatment     This order was created via procedure documentation    Debridement     This order was created via procedure documentation    Debridement     This order was created via procedure documentation    Debridement     This order was created via procedure documentation        Diagnosis  "ICD-10-CM Associated Orders   1. Diabetic ulcer of left foot associated with type 2 diabetes mellitus, with fat layer exposed, unspecified part of foot ([facility])  E11.621 lidocaine (LMX) 4 % cream    L97.522 Wound cleansing and dressings     Wound Procedure Treatment     Debridement     Debridement     Debridement      2. Peripheral artery disease ([facility])  I73.9       3. Type 2 diabetes mellitus with diabetic peripheral angiopathy without gangrene, with long-term current use of insulin ([facility])  E11.51     Z79.4           --  David Frye MD    \"This note has been constructed using a voice recognition system. Therefore there may be syntax, spelling, and/or grammatical errors. Occasional wrong word or \"sound alike\" substitutions may have occurred due to the inherent limitations of voice recognition software. Read the chart carefully and recognize, using context, where substitutions have occurred. Please call if you have any questions.\"     "

## 2024-09-09 NOTE — LETTER
[facility]  [address] [facility] [address]  Phone#  [phone]  Fax#  [phone]    Patient:  Thomas Horne  YOB: 1945  Phone:  [phone]  Date of Visit:  9/9/2024    Orders Placed This Encounter   Procedures   • Wound cleansing and dressings     Left foot wounds:     Hold santyl, do not discard. Store per manufacturers recommendations    Wash your hands with soap and water.  Remove old dressing, discard into plastic bag and place in trash.  Cleanse the wound with dakins moistened gauze prior to applying a clean dressing. Do not use tissue or cotton balls. Do not scrub the wound. Pat dry using gauze.  Shower no, unless able to keep wounds dry.     Apply lotion to skin surrounding wound  Apply polymem max ag to the open wound.    Cover with abd (gauze to toe wound)  Secure with rolled gauze and tape    Change dressing 3 times weekly      Off-loading Instructions:    Wear off-loading device as directed by your physician (darco wedge). Put on immediately when rising in the morning and remove when going to bed and Turn every 2 hours. Avoid position directing pressure to Wound site. Limit side lying to 30 degree tilt. Limit the head of bed elevation to 30 degrees. Limit ambulation to only necessity for activities of daily living.     Wound infection:  If you have signs of infection please call the wound center.  If the wound center is closed- please go to the Emergency department.  Some signs of infection:  fever, chills, increased redness, red streaks, increase in pain, increased drainage.  Drainage with an odor, Change in drainage color: white/milky/green/tan/yellow,  an increase in swelling, chest pain and/or shortness of breath.     Protein: Eat protein with each meal to promote healing.  Examples of protein are fish, meat, chicken, nuts, peanut butter, eggs, lentils, edamame or a protein shake.    [facility] for wound care     Standing Status:    Future     Standing Expiration Date:   9/16/2024         Electronically signed by David Frye MD

## 2024-09-10 ENCOUNTER — OFFICE VISIT (OUTPATIENT)
Dept: VASCULAR SURGERY | Facility: CLINIC | Age: 79
End: 2024-09-10

## 2024-09-10 ENCOUNTER — OFFICE VISIT (OUTPATIENT)
Age: 79
End: 2024-09-10
Payer: COMMERCIAL

## 2024-09-10 ENCOUNTER — TELEPHONE (OUTPATIENT)
Dept: VASCULAR SURGERY | Facility: CLINIC | Age: 79
End: 2024-09-10

## 2024-09-10 VITALS
DIASTOLIC BLOOD PRESSURE: 50 MMHG | BODY MASS INDEX: 29.57 KG/M2 | HEIGHT: 74 IN | SYSTOLIC BLOOD PRESSURE: 102 MMHG | OXYGEN SATURATION: 96 % | WEIGHT: 230.4 LBS | HEART RATE: 56 BPM | RESPIRATION RATE: 16 BRPM

## 2024-09-10 VITALS
WEIGHT: 227 LBS | SYSTOLIC BLOOD PRESSURE: 145 MMHG | DIASTOLIC BLOOD PRESSURE: 80 MMHG | HEIGHT: 74 IN | HEART RATE: 90 BPM | BODY MASS INDEX: 29.13 KG/M2

## 2024-09-10 DIAGNOSIS — Z79.4 TYPE 2 DIABETES MELLITUS WITH DIABETIC PERIPHERAL ANGIOPATHY WITHOUT GANGRENE, WITH LONG-TERM CURRENT USE OF INSULIN (HCC): ICD-10-CM

## 2024-09-10 DIAGNOSIS — G89.4 CHRONIC PAIN SYNDROME: ICD-10-CM

## 2024-09-10 DIAGNOSIS — F11.20 UNCOMPLICATED OPIOID DEPENDENCE (HCC): ICD-10-CM

## 2024-09-10 DIAGNOSIS — Z79.891 LONG-TERM CURRENT USE OF OPIATE ANALGESIC: ICD-10-CM

## 2024-09-10 DIAGNOSIS — E08.621 DIABETIC ULCER OF RIGHT MIDFOOT ASSOCIATED WITH DIABETES MELLITUS DUE TO UNDERLYING CONDITION, LIMITED TO BREAKDOWN OF SKIN (HCC): Primary | ICD-10-CM

## 2024-09-10 DIAGNOSIS — I73.9 PERIPHERAL ARTERY DISEASE (HCC): Primary | ICD-10-CM

## 2024-09-10 DIAGNOSIS — E11.51 TYPE 2 DIABETES MELLITUS WITH DIABETIC PERIPHERAL ANGIOPATHY WITHOUT GANGRENE, WITH LONG-TERM CURRENT USE OF INSULIN (HCC): ICD-10-CM

## 2024-09-10 DIAGNOSIS — L97.411 DIABETIC ULCER OF RIGHT MIDFOOT ASSOCIATED WITH DIABETES MELLITUS DUE TO UNDERLYING CONDITION, LIMITED TO BREAKDOWN OF SKIN (HCC): Primary | ICD-10-CM

## 2024-09-10 PROCEDURE — 99214 OFFICE O/P EST MOD 30 MIN: CPT | Performed by: STUDENT IN AN ORGANIZED HEALTH CARE EDUCATION/TRAINING PROGRAM

## 2024-09-10 PROCEDURE — 99024 POSTOP FOLLOW-UP VISIT: CPT | Performed by: NURSE PRACTITIONER

## 2024-09-10 RX ORDER — PREGABALIN 150 MG/1
150 CAPSULE ORAL 3 TIMES DAILY
Qty: 90 CAPSULE | Refills: 0 | Status: SHIPPED | OUTPATIENT
Start: 2024-09-10 | End: 2024-10-10

## 2024-09-10 RX ORDER — OXYCODONE AND ACETAMINOPHEN 5; 325 MG/1; MG/1
1 TABLET ORAL 2 TIMES DAILY PRN
Qty: 60 TABLET | Refills: 0 | Status: SHIPPED | OUTPATIENT
Start: 2024-09-10 | End: 2024-10-10

## 2024-09-10 NOTE — PROGRESS NOTES
Ambulatory Visit  Name: Thomas Horne      : 1945      MRN: [id number]  Encounter Provider: APOLLO Alexander  Encounter Date: 9/10/2024   Encounter department: [facility]ASC[facility]    Assessment & Plan  Peripheral artery disease (HCC)  79-year-old male, former smoker, with HTN, HLD, CAD s/p CABG, AAA s/p EVAR, type II DM, CHF, pulmonary HTN, CKD 3, prostate CA s/p prostatectomy with urinary sphincter placement, provoked LLE DVT 2024 treated with Coumadin, PAD s/p R CFA to peroneal artery bypass w/ reversed cryovein on 23 and now s/p L distal SFA to BK popliteal artery bypass with PTFE with Dr. Clark 2024 secondary to tissue loss    Patient presents the office for postoperative visit    -Left thigh and proximal calf incisions are intact with staples.  Thigh staples removed.  Left medial proximal calf staples removed every other.  Incision swabbed with Betadine.  Minimal bloody ooze after staple removal  -Dopplerable bypass graft signal and posterior tibial signal  -Patient reports good bleeding at wound care postdebridement yesterday  -Mild distal lower extremity swelling.  Recommended elevation 4 times a day for 30 minutes each  -Continue aspirin, Plavix and atorvastatin  -Follow-up in the office in 1 week for additional staple removal  -Lower extremity arterial duplex in 3 months for bypass graft surveillance       Orders:    VAS ARTERIAL DUPLEX- LOWER LIMB BILATERAL; Future    Type 2 diabetes mellitus with diabetic peripheral angiopathy without gangrene, with long-term current use of insulin ([facility])    Lab Results   Component Value Date    HGBA1C 7.0 (H) 2024              History of Present Illness     Thomas Horne is a 79 y.o. male who presents for postoperative visit, s/p left distal SFA to below-knee popliteal artery bypass secondary to left foot tissue loss.  He was readmitted with shortness of breath - at [facility]. CT negative for PE, BNP  "elevated.  Currently he is breathing comfortable.  He denies any incisional issues.  He is following at the wound care center and had wound debridement with good bleeding per patient.  He denies any significant pain or discomfort.  No incisional concerns    History obtained from : patient  Review of Systems  Medical History Reviewed by provider this encounter:  Tobacco  Allergies  Meds  Problems  Med Hx  Surg Hx  Fam Hx           Objective   I have reviewed and made appropriate changes to the review of systems input by the medical assistant.    Vitals:    09/10/24 1630   BP: 102/50   BP Location: Left arm   Patient Position: Sitting   Cuff Size: Standard   Pulse: 56   Resp: 16   SpO2: 96%   Weight: 105 kg (230 lb 6.4 oz)   Height: 6' 2\" (1.88 m)       Patient Active Problem List   Diagnosis    Mixed hyperlipidemia    Primary hypertension    Coronary artery disease involving native coronary artery of native heart without angina pectoris    S/P angioplasty with stent    Hx of CABG    S/P AAA repair    S/P prostatectomy    H/O prostate cancer    Type 2 diabetes mellitus with diabetic polyneuropathy, with long-term current use of insulin (HCC)    Varicose veins of left lower extremity    History of endovascular stent graft for abdominal aortic aneurysm (AAA)    Bruit (arterial)    Peripheral artery disease (HCC)    Type 2 diabetes mellitus with diabetic peripheral angiopathy without gangrene, with long-term current use of insulin (HCC)    Actinic keratoses    Acute kidney injury superimposed on chronic kidney disease  (HCC)    Platelets decreased (HCC)    Gross hematuria    Chronic HFpEF/Moderate pHTN (HFpEF) (HCC)    Mild aortic stenosis    Chronic kidney disease-mineral and bone disorder    Stable angina pectoris    Hypertensive urgency    Elevated troponin level not due myocardial infarction    Diabetic ulcer of right midfoot associated with diabetes mellitus due to underlying condition, limited to breakdown " of skin (HCC)    Acute on chronic diastolic heart failure (HCC)    Stage 3b chronic kidney disease (HCC)    Frequent PVCs    Acute respiratory distress    Melena    Symptomatic anemia    Multiple gastric ulcers    Iron deficiency anemia due to chronic blood loss    Duodenal ulcer    Acute blood loss anemia    History of colon polyps    Shortness of breath    Iron deficiency anemia    Acute deep vein thrombosis (DVT) of left peroneal vein (HCC)    Plantar fasciitis, right    Diabetic ulcer of toe of left foot associated with type 2 diabetes mellitus, limited to breakdown of skin (HCC)    History of DVT (deep vein thrombosis)    SOB (shortness of breath)    Nausea    Elevated troponin    Foot ulcer (HCC)       Past Surgical History:   Procedure Laterality Date    ABDOMINAL AORTIC ANEURYSM REPAIR      Stented    ADENOIDECTOMY      BACK SURGERY  1985    CARDIAC CATHETERIZATION Left 10/19/2022    Procedure: Cardiac Left Heart Cath;  Surgeon: Danielle Pereira MD;  Location: [facility];  Service: Cardiology    CARDIAC SURGERY  2002    3 cardiac bypass then angioplasty 7/2020    CHOLECYSTECTOMY      COLONOSCOPY  02/14/2024    CORONARY ARTERY BYPASS GRAFT      IR LOWER EXTREMITY ANGIOGRAM  11/01/2023    IR LOWER EXTREMITY ANGIOGRAM  08/07/2024    LAMINECTOMY  1990    MA BYPASS W/VEIN FEMORAL-POPLITEAL Right 11/16/2023    Procedure: BYPASS FEMORAL-POPLITEAL WITH CRYO VEIN, RIGHT FEMORAL ENDARTERECTOMY;  Surgeon: Vasquez Clark MD;  Location: [facility];  Service: Vascular    MA BYPASS W/VEIN FEMORAL-POPLITEAL Left 8/30/2024    Procedure: left lower extremity above knee popliteal to below knee popliteal artery bypass with PTFE graft;  Surgeon: Vasquez Clark MD;  Location: [facility];  Service: Vascular    MA SLCTV CATHJ 3RD+ ORD SLCTV ABDL PEL/LXTR BRNCH Right 11/01/2023    Procedure: ARTERIOGRAM Right lower extremity arteriogram with CO2 via right groin access;  Surgeon: Vasquez Chiu  MD Amber;  Location: [facility];  Service: Vascular    AK SLCTV CATHJ 3RD+ ORD SLCTV ABDL PEL/LXTR BRNCH Left 2024    Procedure: diagnostic LLE Arteriogram;  Surgeon: Vasquez Clark MD;  Location: [facility];  Service: Vascular    PROSTATE SURGERY      TONSILLECTOMY      URINARY SPHINCTER IMPLANT         Family History   Problem Relation Age of Onset    Diabetes Mother     Alcohol abuse Father     Heart disease Brother     Cancer Sister         Thyroid    Cancer Sister         Colon    Cancer Brother         Throat    Cancer Brother     Diabetes Sister     Mental illness Neg Hx        Social History     Socioeconomic History    Marital status:      Spouse name: Not on file    Number of children: 2    Years of education: Not on file    Highest education level: Some college, no degree   Occupational History    Not on file   Tobacco Use    Smoking status: Former     Current packs/day: 0.00     Average packs/day: 1.5 packs/day for 33.0 years (49.5 ttl pk-yrs)     Types: Cigarettes     Start date: 1956     Quit date:      Years since quittin.7     Passive exposure: Past    Smokeless tobacco: Never   Vaping Use    Vaping status: Never Used   Substance and Sexual Activity    Alcohol use: Not Currently     Alcohol/week: 5.0 standard drinks of alcohol     Types: 5 Cans of beer per week     Comment: stopped last few months    Drug use: Never    Sexual activity: Not Currently     Partners: Female     Birth control/protection: Male Sterilization   Other Topics Concern    Not on file   Social History Narrative    Not on file     Social Determinants of Health     Financial Resource Strain: Low Risk  (10/31/2023)    Overall Financial Resource Strain (CARDIA)     Difficulty of Paying Living Expenses: Not hard at all   Food Insecurity: No Food Insecurity (2024)    Hunger Vital Sign     Worried About Running Out of Food in the Last Year: Never true     Ran Out of Food in the Last Year:  Never true   Transportation Needs: No Transportation Needs (9/6/2024)    PRAPARE - Transportation     Lack of Transportation (Medical): No     Lack of Transportation (Non-Medical): No   Physical Activity: Not on file   Stress: Not on file   Social Connections: Not on file   Intimate Partner Violence: Not on file   Housing Stability: Low Risk  (9/6/2024)    Housing Stability Vital Sign     Unable to Pay for Housing in the Last Year: No     Number of Times Moved in the Last Year: 1     Homeless in the Last Year: No       Allergies   Allergen Reactions    Lisinopril Rash and Lip Swelling         Current Outpatient Medications:     acetaminophen (TYLENOL) 325 mg tablet, Take 2 tablets (650 mg total) by mouth every 6 (six) hours as needed for mild pain, Disp: , Rfl:     amLODIPine (NORVASC) 10 mg tablet, Take 0.5 tablets (5 mg total) by mouth daily, Disp: 45 tablet, Rfl: 1    aspirin 81 mg chewable tablet, Chew 81 mg daily, Disp: , Rfl:     atorvastatin (LIPITOR) 40 mg tablet, Take 1 tablet (40 mg total) by mouth daily, Disp: 90 tablet, Rfl: 1    Blood Glucose Monitoring Suppl (OneTouch Verio Reflect) w/Device KIT, Check blood sugars twice daily. Please substitute with appropriate alternative as covered by patient's insurance. Dx: E11.65, Disp: 1 kit, Rfl: 0    cloNIDine (CATAPRES) 0.1 mg tablet, take 1 tablet by mouth every 12 hours, Disp: 180 tablet, Rfl: 1    clopidogrel (PLAVIX) 75 mg tablet, Take 1 tablet (75 mg total) by mouth daily, Disp: 30 tablet, Rfl: 3    DULoxetine (CYMBALTA) 30 mg delayed release capsule, take 1 capsule by mouth once daily, Disp: 90 capsule, Rfl: 0    Empagliflozin (Jardiance) 25 MG TABS, TAKE 1 TABLET BY MOUTH DAILY, Disp: 90 tablet, Rfl: 1    fenofibrate 160 MG tablet, TAKE 1 TABLET BY MOUTH DAILY, Disp: 100 tablet, Rfl: 1    glimepiride (AMARYL) 2 mg tablet, Take 1 tablet (2 mg total) by mouth 2 (two) times a day, Disp: 180 tablet, Rfl: 3    glucose blood (OneTouch Verio) test strip, TEST  TWICE A DAY, Disp: 200 strip, Rfl: 5    insulin glargine (LANTUS) 100 units/mL subcutaneous injection, Inject 15 Units under the skin daily at bedtime (Patient taking differently: Inject 15 Units under the skin daily at bedtime If BS > 150), Disp: 10 mL, Rfl: 0    isosorbide mononitrate (IMDUR) 30 mg 24 hr tablet, Take 3 tablets (90 mg total) by mouth daily, Disp: 270 tablet, Rfl: 1    metFORMIN (GLUCOPHAGE) 500 mg tablet, Take 1 tablet (500 mg total) by mouth 2 (two) times a day with meals, Disp: 180 tablet, Rfl: 1    metoprolol tartrate (LOPRESSOR) 50 mg tablet, Take 0.5 tablets (25 mg total) by mouth every 12 (twelve) hours (Patient taking differently: Take 25 mg by mouth 2 (two) times a day), Disp: 90 tablet, Rfl: 1    Multiple Vitamins-Minerals (MULTIVITAMIN MEN 50+ PO), Take by mouth daily, Disp: , Rfl:     nitroglycerin (NITROSTAT) 0.4 mg SL tablet, Place 1 tablet (0.4 mg total) under the tongue every 5 (five) minutes as needed for chest pain, Disp: 30 tablet, Rfl: 0    Omega-3 Fatty Acids (fish oil) 1,000 mg, Take 4,000 mg by mouth 2 (two) times a day, Disp: , Rfl:     ondansetron (ZOFRAN) 8 mg tablet, Take 0.5 tablets (4 mg total) by mouth every 6 (six) hours as needed for nausea or vomiting, Disp: 20 tablet, Rfl: 0    oxyCODONE-acetaminophen (Percocet) 5-325 mg per tablet, Take 1 tablet by mouth 2 (two) times a day as needed for moderate pain or severe pain Max Daily Amount: 2 tablets, Disp: 60 tablet, Rfl: 0    pantoprazole (PROTONIX) 40 mg tablet, Take 1 tablet (40 mg total) by mouth daily, Disp: 90 tablet, Rfl: 1    pentoxifylline (TRENtal) 400 mg ER tablet, Take 1 tablet (400 mg total) by mouth 3 (three) times a day with meals, Disp: 90 tablet, Rfl: 2    pregabalin (LYRICA) 150 mg capsule, Take 1 capsule (150 mg total) by mouth 3 (three) times a day, Disp: 90 capsule, Rfl: 0    ranolazine (RANEXA) 1000 MG SR tablet, Take 1 tablet (1,000 mg total) by mouth 2 (two) times a day, Disp: 180 tablet, Rfl: 3    " senna (SENOKOT) 8.6 mg, Take 1 tablet (8.6 mg total) by mouth daily Stool softener for constipation-- hold loose stools (Patient taking differently: Take 8.6 mg by mouth if needed Stool softener for constipation-- hold loose stools), Disp: , Rfl:     sitaGLIPtin (Januvia) 100 mg tablet, TAKE 1 TABLET BY MOUTH DAILY, Disp: 100 tablet, Rfl: 1    torsemide (DEMADEX) 20 mg tablet, TAKE 1 TABLET BY MOUTH DAILY, Disp: 90 tablet, Rfl: 1    collagenase (SANTYL) ointment, Apply topically daily (Patient not taking: Reported on 9/10/2024), Disp: 30 g, Rfl: 0    Droplet Pen Needles 32G X 4 MM MISC, USE EVERY EVENING, Disp: 100 each, Rfl: 5    ketoconazole (NIZORAL) 2 % cream, Apply topically daily (Patient not taking: Reported on 9/10/2024), Disp: 60 g, Rfl: 1    OneTouch Delica Lancets 33G MISC, Check blood sugars twice daily. Please substitute with appropriate alternative as covered by patient's insurance. Dx: E11.65, Disp: 200 each, Rfl: 3    /50 (BP Location: Left arm, Patient Position: Sitting, Cuff Size: Standard)   Pulse 56   Resp 16   Ht 6' 2\" (1.88 m)   Wt 105 kg (230 lb 6.4 oz)   SpO2 96%   BMI 29.58 kg/m²     Physical Exam  Vitals and nursing note reviewed. Exam conducted with a chaperone present.   Constitutional:       Appearance: Normal appearance.   Eyes:      Extraocular Movements: Extraocular movements intact.   Cardiovascular:      Comments: Dopplerable left bypass graft signal and distal PT dopplerable  Pulmonary:      Effort: Pulmonary effort is normal.      Breath sounds: Normal breath sounds.   Musculoskeletal:      Comments: Ambulates with walker   1+ left lower extremity swelling    Skin:     General: Skin is warm.      Comments: Left thigh and medial proximal calf incisions intact with staples.  No dehiscence or drainage.  Staples removed at the thigh and partial every other staple removed at the medial proximal calf.   Neurological:      General: No focal deficit present.      Mental " Status: He is alert and oriented to person, place, and time.

## 2024-09-10 NOTE — ASSESSMENT & PLAN NOTE
79-year-old male, former smoker, with HTN, HLD, CAD s/p CABG, AAA s/p EVAR, type II DM, CHF, pulmonary HTN, CKD 3, prostate CA s/p prostatectomy with urinary sphincter placement, provoked LLE DVT 04/2024 treated with Coumadin, PAD s/p R CFA to peroneal artery bypass w/ reversed cryovein on 11/16/23 and now s/p L distal SFA to BK popliteal artery bypass with PTFE with Dr. Clark 8/30/2024 secondary to tissue loss    Patient presents the office for postoperative visit    -Left thigh and proximal calf incisions are intact with staples.  Thigh staples removed.  Left medial proximal calf staples removed every other.  Incision swabbed with Betadine.  Minimal bloody ooze after staple removal  -Dopplerable bypass graft signal and posterior tibial signal  -Patient reports good bleeding at wound care postdebridement yesterday  -Mild distal lower extremity swelling.  Recommended elevation 4 times a day for 30 minutes each  -Continue aspirin, Plavix and atorvastatin  -Follow-up in the office in 1 week for additional staple removal  -Lower extremity arterial duplex in 3 months for bypass graft surveillance       Orders:    VAS ARTERIAL DUPLEX- LOWER LIMB BILATERAL; Future

## 2024-09-10 NOTE — PROGRESS NOTES
Pain Medicine Follow-Up Note    Assessment:  1. Diabetic ulcer of right midfoot associated with diabetes mellitus due to underlying condition, limited to breakdown of skin (HCC)    2. Long-term current use of opiate analgesic    3. Uncomplicated opioid dependence (HCC)    4. Chronic pain syndrome      Patient is a pleasant 79-year-old male who presents as a follow-up visit after last being seen on 8/13/2024 for symptoms of diabetic neuropathy.  At that time patient was rotated from gabapentin 600 mg 3 times a day to Lyrica 100 mg 3 times a day.  Patient was also prescribed Percocet by Dr. Arango which she has been taking.  Since the last visit patient underwent a lower extremity bypass procedure with vascular surgery and has underwent multiple debridements with wound care.  Patient is observed same rates pain as a 10 out of 10 on migraine scale.  The pain is worse throughout the day and pain is intermittent.  Quality pain is sharp, shooting pain primarily in his bilateral feet.  Patient not obtaining significant benefit from his current pain regimen as he reports 5 to 10% relief of his symptoms.    Patient with increased pain in the setting of his wound debridement and lower extremity bypass.  Patient currently managed with Percocet by Dr. Arango.  Will take over the prescription today continuing Percocet 5-3 25 twice daily as needed.  Opiate agreement and oral drug swab obtained in office today.  For symptomatic relief we will also increase patient's Lyrica to 150 mg 3 times daily as he reports no side effects from his current regimen.  Lastly patient counseled continue with Tylenol 9 7 5 mg 3 times daily as needed.  Plan:  Orders Placed This Encounter   Procedures    MM OF_Amphetamine Definitive Test    MM OF_Alprazolam Definitive    MM OF_Buprenorphine Definitive Test    MM OF_Clonazepam Definitive    MM OF_Cocaine Definitive Test    MM OF_Codeine Definitive    MM OF_Diazepam Definitive    MM OF_Fentanyl  Definitive Test    MM OF_Heroin Definitive Test    MM OF_Hydrocodone Definitive    MM OF_Hydromorphone Definitive    MM OF_Lorazepam Definitive    MM OF_MDMA Definitive Test    MM OF_Meperidine Definitive Test    MM OF_Methadone Definitive Test    MM OF_Methylphenidate Definitive Test    MM OF_Morphine Definitive    MM OF_Oxazepam Definitive    MM OF_Oxycodone Definitive Test - Oral Fluid    MM OF_Oxymorphone Definitive Test    MM OF_Phencyclidine Definitive Test    MM OF_Temazepam Definitive    MM OF_THC Definitive Test    MM OF_Tramadol Definitive Test    MM ALL_Prescribed Meds and Special Instructions     Order Specific Question:   [facility] Is ATORVASTATIN prescribed?     Answer:   Yes     Order Specific Question:   [facility] Is DULOXETINE prescribed?     Answer:   Yes     Order Specific Question:   [facility] Is OXYCODONE/APAP prescribed?     Answer:   Yes     Order Specific Question:   [facility] Is PREGABALIN Prescribed?     Answer:   Yes       New Medications Ordered This Visit   Medications    pregabalin (LYRICA) 150 mg capsule     Sig: Take 1 capsule (150 mg total) by mouth 3 (three) times a day     Dispense:  90 capsule     Refill:  0    oxyCODONE-acetaminophen (Percocet) 5-325 mg per tablet     Sig: Take 1 tablet by mouth 2 (two) times a day as needed for moderate pain or severe pain Max Daily Amount: 2 tablets     Dispense:  60 tablet     Refill:  0       My impressions and treatment recommendations were discussed in detail with the patient who verbalized understanding and had no further questions.      [place] Prescription Drug Monitoring Program report was reviewed and was appropriate  and [place] Prescription Drug Monitoring Program report was reviewed and was appropriate     A urine drug screen was collected at today's office visit as part of our medication management protocol. The point of care testing results were appropriate for what was being prescribed. The specimen will be sent for  confirmatory testing. The drug screen is medically necessary because the patient is either dependent on opioid medication or is being considered for opioid medication therapy and the results could impact ongoing or future treatment. The drug screen is to evaluate for the presences or absence of prescribed, non-prescribed, and/or illicit drugs/substances.    There are risks associated with opioid medications, including dependence, addiction and tolerance. The patient understands and agrees to use these medications only as prescribed. Potential side effects of the medications include, but are not limited to, constipation, drowsiness, addiction, impaired judgment and risk of fatal overdose if not taken as prescribed. The patient was warned against driving while taking sedation medications.  Sharing medications is a felony. At this point in time, the patient is showing no signs of addiction, abuse, diversion or suicidal ideation.      An oral drug screen swab was collected at today's office visit. The swab will be sent for confirmatory testing. The drug screen is medically necessary because the patient is either dependent on opioid medication or is being considered for opioid medication therapy and the results could impact ongoing or future treatment. The drug screen is to evaluate for the presences or absence of prescribed, non-prescribed, and/or illicit drugs/substances.     Follow-up is planned in four weeks time or sooner as warranted.  Discharge instructions were provided. I personally saw and examined the patient and I agree with the above discussed plan of care.    History of Present Illness:    Thomas Horne is a 79 y.o. male who presents to [facility] for interval re-evaluation of the above stated pain complaints. The patient has a past medical and chronic pain history as outlined in the assessment section. He was last seen on 8/13/2024.    Patient is a pleasant 79-year-old male who  presents as a follow-up visit after last being seen on 8/13/2024 for symptoms of diabetic neuropathy.  At that time patient was rotated from gabapentin 600 mg 3 times a day to Lyrica 100 mg 3 times a day.  Patient was also prescribed Percocet by Dr. Arango which she has been taking.  Since the last visit patient underwent a lower extremity bypass procedure with vascular surgery and has underwent multiple debridements with wound care.  Patient is observed same rates pain as a 10 out of 10 on migraine scale.  The pain is worse throughout the day and pain is intermittent.  Quality pain is sharp, shooting pain primarily in his bilateral feet.  Patient not obtaining significant benefit from his current pain regimen as he reports 5 to 10% relief of his symptoms.    Pain Contract Signed:  9/10/2024  Last Urine Drug Screen:  9/10/2024    Other than as stated above, the patient denies any interval changes in medications, medical condition, mental condition, symptoms, or allergies since the last office visit.         Review of Systems:    Review of Systems   Respiratory:  Negative for shortness of breath.    Cardiovascular:  Negative for chest pain.   Gastrointestinal:  Positive for nausea. Negative for constipation, diarrhea and vomiting.   Musculoskeletal:  Positive for gait problem. Negative for arthralgias, joint swelling and myalgias.        Decreased ROM  Swelling in left foot  Pain in left foot   Skin:  Negative for rash.   Neurological:  Negative for dizziness, seizures and weakness.   All other systems reviewed and are negative.        Past Medical History:   Diagnosis Date    Anemia     CAD (coronary artery disease)     Callus     Cancer (HCC)     prostate    CHF (congestive heart failure) (HCC)     Chronic kidney disease     Clotting disorder (HCC)     Coronary artery disease 1988    Deep vein thrombosis (HCC)     Diabetes mellitus (HCC)     Difficulty walking     Duodenal ulcer     Heart disease 1988     Hyperlipidemia     Hypertension     Myocardial infarction (HCC)     Neuropathy     Bilateral feet    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)     Plantar fasciitis     Sleep apnea     Could not tolerate CPAP       Past Surgical History:   Procedure Laterality Date    ABDOMINAL AORTIC ANEURYSM REPAIR      Stented    ADENOIDECTOMY      BACK SURGERY  1985    CARDIAC CATHETERIZATION Left 10/19/2022    Procedure: Cardiac Left Heart Cath;  Surgeon: Danielle Pereira MD;  [facility];  Service: Cardiology    CARDIAC SURGERY  2002    3 cardiac bypass then angioplasty 7/2020    CHOLECYSTECTOMY      COLONOSCOPY  02/14/2024    CORONARY ARTERY BYPASS GRAFT      IR LOWER EXTREMITY ANGIOGRAM  11/01/2023    IR LOWER EXTREMITY ANGIOGRAM  08/07/2024    LAMINECTOMY  1990    OH BYPASS W/VEIN FEMORAL-POPLITEAL Right 11/16/2023    Procedure: BYPASS FEMORAL-POPLITEAL WITH CRYO VEIN, RIGHT FEMORAL ENDARTERECTOMY;  Surgeon: Vasquez Clark MD;  Location: [facility];  Service: Vascular    OH BYPASS W/VEIN FEMORAL-POPLITEAL Left 8/30/2024    Procedure: left lower extremity above knee popliteal to below knee popliteal artery bypass with PTFE graft;  Surgeon: Vasquez Clark MD;  Location: [facility];  Service: Vascular    OH SLCTV CATHJ 3RD+ ORD SLCTV ABDL PEL/LXTR BRNCH Right 11/01/2023    Procedure: ARTERIOGRAM Right lower extremity arteriogram with CO2 via right groin access;  Surgeon: Vasquez Clark MD;  Location[facility];  Service: Vascular    OH SLCTV CATHJ 3RD+ ORD SLCTV ABDL PEL/LXTR BRNCH Left 08/07/2024    Procedure: diagnostic LLE Arteriogram;  Surgeon: Vasquez Clark MD;  Location[facility];  Service: Vascular    PROSTATE SURGERY      TONSILLECTOMY      URINARY SPHINCTER IMPLANT         Family History   Problem Relation Age of Onset    Diabetes Mother     Alcohol abuse Father     Heart disease Brother     Cancer Sister         Thyroid    Cancer Sister         Colon     Cancer Brother         Throat    Cancer Brother     Diabetes Sister     Mental illness Neg Hx        Social History     Occupational History    Not on file   Tobacco Use    Smoking status: Former     Current packs/day: 0.00     Average packs/day: 1.5 packs/day for 33.0 years (49.5 ttl pk-yrs)     Types: Cigarettes     Start date: 1956     Quit date:      Years since quittin.7     Passive exposure: Past    Smokeless tobacco: Never   Vaping Use    Vaping status: Never Used   Substance and Sexual Activity    Alcohol use: Not Currently     Alcohol/week: 5.0 standard drinks of alcohol     Types: 5 Cans of beer per week     Comment: stopped last few months    Drug use: Never    Sexual activity: Not Currently     Partners: Female     Birth control/protection: Male Sterilization         Current Outpatient Medications:     oxyCODONE-acetaminophen (Percocet) 5-325 mg per tablet, Take 1 tablet by mouth 2 (two) times a day as needed for moderate pain or severe pain Max Daily Amount: 2 tablets, Disp: 60 tablet, Rfl: 0    pregabalin (LYRICA) 150 mg capsule, Take 1 capsule (150 mg total) by mouth 3 (three) times a day, Disp: 90 capsule, Rfl: 0    acetaminophen (TYLENOL) 325 mg tablet, Take 2 tablets (650 mg total) by mouth every 6 (six) hours as needed for mild pain, Disp: , Rfl:     amLODIPine (NORVASC) 10 mg tablet, Take 0.5 tablets (5 mg total) by mouth daily, Disp: 45 tablet, Rfl: 1    aspirin 81 mg chewable tablet, Chew 81 mg daily, Disp: , Rfl:     atorvastatin (LIPITOR) 40 mg tablet, Take 1 tablet (40 mg total) by mouth daily, Disp: 90 tablet, Rfl: 1    Blood Glucose Monitoring Suppl (OneTouch Verio Reflect) w/Device KIT, Check blood sugars twice daily. Please substitute with appropriate alternative as covered by patient's insurance. Dx: E11.65, Disp: 1 kit, Rfl: 0    cloNIDine (CATAPRES) 0.1 mg tablet, take 1 tablet by mouth every 12 hours, Disp: 180 tablet, Rfl: 1    clopidogrel (PLAVIX) 75 mg tablet, Take 1  tablet (75 mg total) by mouth daily, Disp: 30 tablet, Rfl: 3    collagenase (SANTYL) ointment, Apply topically daily, Disp: 30 g, Rfl: 0    Droplet Pen Needles 32G X 4 MM MISC, USE EVERY EVENING, Disp: 100 each, Rfl: 5    DULoxetine (CYMBALTA) 30 mg delayed release capsule, take 1 capsule by mouth once daily, Disp: 90 capsule, Rfl: 0    Empagliflozin (Jardiance) 25 MG TABS, TAKE 1 TABLET BY MOUTH DAILY, Disp: 90 tablet, Rfl: 1    fenofibrate 160 MG tablet, TAKE 1 TABLET BY MOUTH DAILY, Disp: 100 tablet, Rfl: 1    glimepiride (AMARYL) 2 mg tablet, Take 1 tablet (2 mg total) by mouth 2 (two) times a day, Disp: 180 tablet, Rfl: 3    glucose blood (OneTouch Verio) test strip, TEST TWICE A DAY, Disp: 200 strip, Rfl: 5    insulin glargine (LANTUS) 100 units/mL subcutaneous injection, Inject 15 Units under the skin daily at bedtime (Patient taking differently: Inject 15 Units under the skin daily at bedtime If BS > 150), Disp: 10 mL, Rfl: 0    isosorbide mononitrate (IMDUR) 30 mg 24 hr tablet, Take 3 tablets (90 mg total) by mouth daily, Disp: 270 tablet, Rfl: 1    ketoconazole (NIZORAL) 2 % cream, Apply topically daily, Disp: 60 g, Rfl: 1    metFORMIN (GLUCOPHAGE) 500 mg tablet, Take 1 tablet (500 mg total) by mouth 2 (two) times a day with meals, Disp: 180 tablet, Rfl: 1    metoprolol tartrate (LOPRESSOR) 50 mg tablet, Take 0.5 tablets (25 mg total) by mouth every 12 (twelve) hours, Disp: 90 tablet, Rfl: 1    Multiple Vitamins-Minerals (MULTIVITAMIN MEN 50+ PO), Take by mouth daily, Disp: , Rfl:     nitroglycerin (NITROSTAT) 0.4 mg SL tablet, Place 1 tablet (0.4 mg total) under the tongue every 5 (five) minutes as needed for chest pain, Disp: 30 tablet, Rfl: 0    Omega-3 Fatty Acids (fish oil) 1,000 mg, Take 4,000 mg by mouth 2 (two) times a day, Disp: , Rfl:     ondansetron (ZOFRAN) 8 mg tablet, Take 0.5 tablets (4 mg total) by mouth every 6 (six) hours as needed for nausea or vomiting, Disp: 20 tablet, Rfl: 0     "OneTouch Delica Lancets 33G MISC, Check blood sugars twice daily. Please substitute with appropriate alternative as covered by patient's insurance. Dx: E11.65, Disp: 200 each, Rfl: 3    pantoprazole (PROTONIX) 40 mg tablet, Take 1 tablet (40 mg total) by mouth daily, Disp: 90 tablet, Rfl: 1    pentoxifylline (TRENtal) 400 mg ER tablet, Take 1 tablet (400 mg total) by mouth 3 (three) times a day with meals, Disp: 90 tablet, Rfl: 2    ranolazine (RANEXA) 1000 MG SR tablet, Take 1 tablet (1,000 mg total) by mouth 2 (two) times a day, Disp: 180 tablet, Rfl: 3    senna (SENOKOT) 8.6 mg, Take 1 tablet (8.6 mg total) by mouth daily Stool softener for constipation-- hold loose stools, Disp: , Rfl:     sitaGLIPtin (Januvia) 100 mg tablet, TAKE 1 TABLET BY MOUTH DAILY, Disp: 100 tablet, Rfl: 1    torsemide (DEMADEX) 20 mg tablet, TAKE 1 TABLET BY MOUTH DAILY, Disp: 90 tablet, Rfl: 1    Allergies   Allergen Reactions    Lisinopril Rash and Lip Swelling       Physical Exam:    /80   Pulse 90   Ht 6' 2\" (1.88 m)   Wt 103 kg (227 lb)   BMI 29.15 kg/m²     Constitutional:normal, well developed, well nourished, alert, in no distress and non-toxic and no overt pain behavior.  Eyes:anicteric  HEENT:grossly intact  Neck:supple, symmetric, trachea midline and no masses   Pulmonary:even and unlabored  Cardiovascular:No edema or pitting edema present  Skin:Normal without rashes or lesions and well hydrated  Psychiatric:Mood and affect appropriate  Neurologic:Cranial Nerves II-XII grossly intact  Musculoskeletal:antalgic gait. Boot on foot. Surgical incision with staples intact.       Imaging  No orders to display         Orders Placed This Encounter   Procedures    MM OF_Amphetamine Definitive Test    MM OF_Alprazolam Definitive    MM OF_Buprenorphine Definitive Test    MM OF_Clonazepam Definitive    MM OF_Cocaine Definitive Test    MM OF_Codeine Definitive    MM OF_Diazepam Definitive    MM OF_Fentanyl Definitive Test    MM " OF_Heroin Definitive Test    MM OF_Hydrocodone Definitive    MM OF_Hydromorphone Definitive    MM OF_Lorazepam Definitive    MM OF_MDMA Definitive Test    MM OF_Meperidine Definitive Test    MM OF_Methadone Definitive Test    MM OF_Methylphenidate Definitive Test    MM OF_Morphine Definitive    MM OF_Oxazepam Definitive    MM OF_Oxycodone Definitive Test - Oral Fluid    MM OF_Oxymorphone Definitive Test    MM OF_Phencyclidine Definitive Test    MM OF_Temazepam Definitive    MM OF_THC Definitive Test    MM OF_Tramadol Definitive Test    MM ALL_Prescribed Meds and Special Instructions

## 2024-09-11 ENCOUNTER — PATIENT OUTREACH (OUTPATIENT)
Dept: CASE MANAGEMENT | Facility: HOSPITAL | Age: 79
End: 2024-09-11

## 2024-09-11 NOTE — PROGRESS NOTES
ADT Alert received via IB. Chart reviewed. Patient admitted for Observation Status 9/6-9/7 due to SOB. Patient presented to ER with c/o sob, nausea and poor appetite x 2 days. Patient underwent fem bypass on 8/30 and dc'd home on 9/4. Patient discharged home with VNA of NN.    PMH: HTN, HLD, CAD s/p CABG, AAA s/p EVAR, type II DM, CHF, pulmonary HTN, CKD3b, prostate CA s/p prostatectomy with urinary sphincter placement, provoked LLE DVT 04/2024, and PAD.     9/9 [facility] L foot ulcer  9/10 [facility] and: Percocet 5-325 BID ordered and Lyrica increased to 150 mg TID   9/10 Vascular Surgery: No med changes  Left thigh and proximal calf incisions are intact with staples.  Thigh staples removed.  Left medial proximal calf staples removed every other.  Incision swabbed with Betadine.  Minimal bloody ooze after staple removal. Continue aspirin, Plavix and atorvastatin.-Follow-up in the office in 1 week for additional staple removal    This RNCM called patient for follow up. Patient says he is feeling well. He says his breathing is good. He denies any CP, chest tightness or SOB. No dizziness or lightheadedness. He denies any swelling. He says he did not weigh himself today. He weighed himself yesterday and he could not remember what it was. He didn't have it in front of him. We discussed the importance of weighing self daily and logging and calling Cardiology with a wt gain of 3 lbs in 1 days or 5 lbs in 1 week. He states understanding and says he will.     Patients AVS and Medications were reviewed. Patient manages his medications. He states he has the Percocet that was ordered by Pain dr but he has not started it yet. He has increased his Lyrica as noted above. He has all medications and is taking them as directed. He denies any questions or concerns regarding.    HF s/s to watch for and when to call his cardiologist were reviewed and he states understanding. We discussed low salt diet and he says he adheres and fluid  restriction.    Patient has PCP VENKAT apt tomorrow and cardiology apt next month. Patient says he has transportation.    Patient denies any questions or concerns at this time and is agreeable to a follow up call in 2 weeks.

## 2024-09-12 ENCOUNTER — APPOINTMENT (OUTPATIENT)
Dept: PHYSICAL THERAPY | Facility: CLINIC | Age: 79
End: 2024-09-12
Payer: COMMERCIAL

## 2024-09-12 ENCOUNTER — OFFICE VISIT (OUTPATIENT)
Dept: FAMILY MEDICINE CLINIC | Facility: CLINIC | Age: 79
End: 2024-09-12
Payer: COMMERCIAL

## 2024-09-12 VITALS
WEIGHT: 231.8 LBS | BODY MASS INDEX: 29.75 KG/M2 | HEIGHT: 74 IN | HEART RATE: 50 BPM | TEMPERATURE: 97.6 F | RESPIRATION RATE: 16 BRPM | DIASTOLIC BLOOD PRESSURE: 68 MMHG | SYSTOLIC BLOOD PRESSURE: 126 MMHG

## 2024-09-12 DIAGNOSIS — Z79.4 TYPE 2 DIABETES MELLITUS WITH DIABETIC PERIPHERAL ANGIOPATHY WITHOUT GANGRENE, WITH LONG-TERM CURRENT USE OF INSULIN (HCC): Primary | ICD-10-CM

## 2024-09-12 DIAGNOSIS — I10 PRIMARY HYPERTENSION: ICD-10-CM

## 2024-09-12 DIAGNOSIS — I20.89 STABLE ANGINA PECTORIS: ICD-10-CM

## 2024-09-12 DIAGNOSIS — E11.51 TYPE 2 DIABETES MELLITUS WITH DIABETIC PERIPHERAL ANGIOPATHY WITHOUT GANGRENE, WITH LONG-TERM CURRENT USE OF INSULIN (HCC): Primary | ICD-10-CM

## 2024-09-12 DIAGNOSIS — E11.621 DIABETIC ULCER OF TOE OF LEFT FOOT ASSOCIATED WITH TYPE 2 DIABETES MELLITUS, LIMITED TO BREAKDOWN OF SKIN (HCC): ICD-10-CM

## 2024-09-12 DIAGNOSIS — I50.33 ACUTE ON CHRONIC DIASTOLIC HEART FAILURE (HCC): ICD-10-CM

## 2024-09-12 DIAGNOSIS — L97.521 DIABETIC ULCER OF TOE OF LEFT FOOT ASSOCIATED WITH TYPE 2 DIABETES MELLITUS, LIMITED TO BREAKDOWN OF SKIN (HCC): ICD-10-CM

## 2024-09-12 DIAGNOSIS — I25.10 CORONARY ARTERY DISEASE INVOLVING NATIVE CORONARY ARTERY OF NATIVE HEART WITHOUT ANGINA PECTORIS: ICD-10-CM

## 2024-09-12 PROBLEM — D62 ACUTE BLOOD LOSS ANEMIA: Status: RESOLVED | Noted: 2024-02-01 | Resolved: 2024-09-12

## 2024-09-12 PROBLEM — R06.03 ACUTE RESPIRATORY DISTRESS: Status: RESOLVED | Noted: 2023-11-20 | Resolved: 2024-09-12

## 2024-09-12 PROCEDURE — 99496 TRANSJ CARE MGMT HIGH F2F 7D: CPT | Performed by: FAMILY MEDICINE

## 2024-09-12 PROCEDURE — 92250 FUNDUS PHOTOGRAPHY W/I&R: CPT | Performed by: FAMILY MEDICINE

## 2024-09-12 NOTE — PROGRESS NOTES
Ambulatory Visit  Name: Thomas Horne      : 1945      MRN: [id number]  Encounter Provider: Frank Lombardi, DO  Encounter Date: 2024   Encounter department: [facility]    Assessment & Plan       Preventive health issues were discussed with patient, and age appropriate screening tests were ordered as noted in patient's After Visit Summary. Personalized health advice and appropriate referrals for health education or preventive services given if needed, as noted in patient's After Visit Summary.    History of Present Illness     HPI   Patient Care Team:  Frank Lombardi, DO as PCP - General (Family Medicine)  Naun Bell MD as Consulting Physician (Cardiology)  Prashanth Nelson MD as Consulting Physician (Ophthalmology)  Sundar Arango DPM as Consulting Physician (Podiatry)  Rajan Jack MD as Consulting Physician (Vascular Surgery)  Jose Fischer MD (Nephrology)  Karma Melissa PA-C as Physician Assistant (Hematology and Oncology)  Olga Pisano RN as RN Care Manager (Care Coordination)    Review of Systems  Medical History Reviewed by provider this encounter:       Annual Wellness Visit Questionnaire       Health Risk Assessment:   Patient rates overall health as fair. Patient feels that their physical health rating is much worse. Patient is very satisfied with their life. Eyesight was rated as slightly worse. Hearing was rated as same. Patient feels that their emotional and mental health rating is much better. Patients states they are never, rarely angry. Patient states they are always unusually tired/fatigued. Pain experienced in the last 7 days has been some. Patient's pain rating has been 1/10. Patient states that he has experienced no weight loss or gain in last 6 months.     Fall Risk Screening:   In the past year, patient has experienced: no history of falling in past year      Home Safety:  Patient does not have trouble with stairs inside or outside of their home.  Patient has working smoke alarms and has working carbon monoxide detector.     Medications:   Patient is currently taking over-the-counter supplements. OTC medications include: see medication list. Patient is not able to manage medications.     Activities of Daily Living (ADLs)/Instrumental Activities of Daily Living (IADLs):   Walk and transfer into and out of bed and chair?: Yes  Dress and groom yourself?: Yes    Bathe or shower yourself?: Yes    Feed yourself? Yes  Do your laundry/housekeeping?: Yes  Manage your money, pay your bills and track your expenses?: Yes  Make your own meals?: Yes    Do your own shopping?: Yes    Advance Care Planning:   Living will: Yes    Advanced directive: Yes      PREVENTIVE SCREENINGS      Cardiovascular Screening:    General: Screening Not Indicated and History Lipid Disorder      Diabetes Screening:     General: Screening Not Indicated and History Diabetes      Prostate Cancer Screening:    General: History Prostate Cancer and Screening Not Indicated      Abdominal Aortic Aneurysm (AAA) Screening:    Risk factors include: tobacco use        General: Screening Not Indicated and History AAA      Lung Cancer Screening:     General: Screening Not Indicated      Hepatitis C Screening:    General: Screening Current    Screening, Brief Intervention, and Referral to Treatment (SBIRT)    Screening      AUDIT-C Screenin) How often did you have a drink containing alcohol in the past year? never  2) How many drinks did you have on a typical day when you were drinking in the past year? 0  3) How often did you have 6 or more drinks on one occasion in the past year? never    AUDIT-C Score: 0  Interpretation: Score 0-3 (male): Negative screen for alcohol misuse    Social Determinants of Health     Financial Resource Strain: Low Risk  (10/31/2023)    Overall Financial Resource Strain (CARDIA)     Difficulty of Paying Living Expenses: Not hard at all   Food Insecurity: No Food Insecurity  (9/6/2024)    Hunger Vital Sign     Worried About Running Out of Food in the Last Year: Never true     Ran Out of Food in the Last Year: Never true   Transportation Needs: No Transportation Needs (9/6/2024)    PRAPARE - Transportation     Lack of Transportation (Medical): No     Lack of Transportation (Non-Medical): No   Housing Stability: Low Risk  (9/6/2024)    Housing Stability Vital Sign     Unable to Pay for Housing in the Last Year: No     Number of Times Moved in the Last Year: 1     Homeless in the Last Year: No   Utilities: Not At Risk (9/6/2024)    [facility] Utilities     Threatened with loss of utilities: No     No results found.    Objective     There were no vitals taken for this visit.    Physical Exam

## 2024-09-12 NOTE — ASSESSMENT & PLAN NOTE
Pt has only taken lantus three times in last month - sugars - have been on low side.  Will stop amyryl, get back on lantus and will follow logs in 5 weeks  Lab Results   Component Value Date    HGBA1C 7.0 (H) 09/03/2024

## 2024-09-12 NOTE — PATIENT INSTRUCTIONS
Medicare Preventive Visit Patient Instructions  Thank you for completing your Welcome to Medicare Visit or Medicare Annual Wellness Visit today. Your next wellness visit will be due in one year (9/13/2025).  The screening/preventive services that you may require over the next 5-10 years are detailed below. Some tests may not apply to you based off risk factors and/or age. Screening tests ordered at today's visit but not completed yet may show as past due. Also, please note that scanned in results may not display below.  Preventive Screenings:  Service Recommendations Previous Testing/Comments   Colorectal Cancer Screening  Colonoscopy    Fecal Occult Blood Test (FOBT)/Fecal Immunochemical Test (FIT)  Fecal DNA/Cologuard Test  Flexible Sigmoidoscopy Age: 45-75 years old   Colonoscopy: every 10 years (May be performed more frequently if at higher risk)  OR  FOBT/FIT: every 1 year  OR  Cologuard: every 3 years  OR  Sigmoidoscopy: every 5 years  Screening may be recommended earlier than age 45 if at higher risk for colorectal cancer. Also, an individualized decision between you and your healthcare provider will decide whether screening between the ages of 76-85 would be appropriate. Colonoscopy: 02/14/2024  FOBT/FIT: Not on file  Cologuard: Not on file  Sigmoidoscopy: Not on file          Prostate Cancer Screening Individualized decision between patient and health care provider in men between ages of 55-69   Medicare will cover every 12 months beginning on the day after your 50th birthday PSA: <0.01 ng/mL     History Prostate Cancer  Screening Not Indicated     Hepatitis C Screening Once for adults born between 1945 and 1965  More frequently in patients at high risk for Hepatitis C Hep C Antibody: 01/11/2022    Screening Current   Diabetes Screening 1-2 times per year if you're at risk for diabetes or have pre-diabetes Fasting glucose: 59 mg/dL (9/7/2024)  A1C: 7.0 % (9/3/2024)  Screening Not Indicated  History Diabetes    Cholesterol Screening Once every 5 years if you don't have a lipid disorder. May order more often based on risk factors. Lipid panel: 04/17/2024  Screening Not Indicated  History Lipid Disorder      Other Preventive Screenings Covered by Medicare:  Abdominal Aortic Aneurysm (AAA) Screening: covered once if your at risk. You're considered to be at risk if you have a family history of AAA or a male between the age of 65-75 who smoking at least 100 cigarettes in your lifetime.  Lung Cancer Screening: covers low dose CT scan once per year if you meet all of the following conditions: (1) Age 55-77; (2) No signs or symptoms of lung cancer; (3) Current smoker or have quit smoking within the last 15 years; (4) You have a tobacco smoking history of at least 20 pack years (packs per day x number of years you smoked); (5) You get a written order from a healthcare provider.  Glaucoma Screening: covered annually if you're considered high risk: (1) You have diabetes OR (2) Family history of glaucoma OR (3)  aged 50 and older OR (4)  American aged 65 and older  Osteoporosis Screening: covered every 2 years if you meet one of the following conditions: (1) Have a vertebral abnormality; (2) On glucocorticoid therapy for more than 3 months; (3) Have primary hyperparathyroidism; (4) On osteoporosis medications and need to assess response to drug therapy.  HIV Screening: covered annually if you're between the age of 15-65. Also covered annually if you are younger than 15 and older than 65 with risk factors for HIV infection. For pregnant patients, it is covered up to 3 times per pregnancy.    Immunizations:  Immunization Recommendations   Influenza Vaccine Annual influenza vaccination during flu season is recommended for all persons aged >= 6 months who do not have contraindications   Pneumococcal Vaccine   * Pneumococcal conjugate vaccine = PCV13 (Prevnar 13), PCV15 (Vaxneuvance), PCV20 (Prevnar 20)  *  Pneumococcal polysaccharide vaccine = PPSV23 (Pneumovax) Adults 19-65 yo with certain risk factors or if 65+ yo  If never received any pneumonia vaccine: recommend Prevnar 20 (PCV20)  Give PCV20 if previously received 1 dose of PCV13 or PPSV23   Hepatitis B Vaccine 3 dose series if at intermediate or high risk (ex: diabetes, end stage renal disease, liver disease)   Respiratory syncytial virus (RSV) Vaccine - COVERED BY MEDICARE PART D  * RSVPreF3 (Arexvy) CDC recommends that adults 60 years of age and older may receive a single dose of RSV vaccine using shared clinical decision-making (SCDM)   Tetanus (Td) Vaccine - COST NOT COVERED BY MEDICARE PART B Following completion of primary series, a booster dose should be given every 10 years to maintain immunity against tetanus. Td may also be given as tetanus wound prophylaxis.   Tdap Vaccine - COST NOT COVERED BY MEDICARE PART B Recommended at least once for all adults. For pregnant patients, recommended with each pregnancy.   Shingles Vaccine (Shingrix) - COST NOT COVERED BY MEDICARE PART B  2 shot series recommended in those 19 years and older who have or will have weakened immune systems or those 50 years and older     Health Maintenance Due:      Topic Date Due   • Hepatitis C Screening  Completed     Immunizations Due:      Topic Date Due   • COVID-19 Vaccine (5 - 2023-24 season) 09/01/2024   • Influenza Vaccine (1) 09/01/2024     Advance Directives   What are advance directives?  Advance directives are legal documents that state your wishes and plans for medical care. These plans are made ahead of time in case you lose your ability to make decisions for yourself. Advance directives can apply to any medical decision, such as the treatments you want, and if you want to donate organs.   What are the types of advance directives?  There are many types of advance directives, and each state has rules about how to use them. You may choose a combination of any of the  following:  Living will:  This is a written record of the treatment you want. You can also choose which treatments you do not want, which to limit, and which to stop at a certain time. This includes surgery, medicine, IV fluid, and tube feedings.   Durable power of  for healthcare (DPAHC):  This is a written record that states who you want to make healthcare choices for you when you are unable to make them for yourself. This person, called a proxy, is usually a family member or a friend. You may choose more than 1 proxy.  Do not resuscitate (DNR) order:  A DNR order is used in case your heart stops beating or you stop breathing. It is a request not to have certain forms of treatment, such as CPR. A DNR order may be included in other types of advance directives.  Medical directive:  This covers the care that you want if you are in a coma, near death, or unable to make decisions for yourself. You can list the treatments you want for each condition. Treatment may include pain medicine, surgery, blood transfusions, dialysis, IV or tube feedings, and a ventilator (breathing machine).  Values history:  This document has questions about your views, beliefs, and how you feel and think about life. This information can help others choose the care that you would choose.  Why are advance directives important?  An advance directive helps you control your care. Although spoken wishes may be used, it is better to have your wishes written down. Spoken wishes can be misunderstood, or not followed. Treatments may be given even if you do not want them. An advance directive may make it easier for your family to make difficult choices about your care.   Weight Management   Why it is important to manage your weight:  Being overweight increases your risk of health conditions such as heart disease, high blood pressure, type 2 diabetes, and certain types of cancer. It can also increase your risk for osteoarthritis, sleep apnea, and  other respiratory problems. Aim for a slow, steady weight loss. Even a small amount of weight loss can lower your risk of health problems.  How to lose weight safely:  A safe and healthy way to lose weight is to eat fewer calories and get regular exercise. You can lose up about 1 pound a week by decreasing the number of calories you eat by 500 calories each day.   Healthy meal plan for weight management:  A healthy meal plan includes a variety of foods, contains fewer calories, and helps you stay healthy. A healthy meal plan includes the following:  Eat whole-grain foods more often.  A healthy meal plan should contain fiber. Fiber is the part of grains, fruits, and vegetables that is not broken down by your body. Whole-grain foods are healthy and provide extra fiber in your diet. Some examples of whole-grain foods are whole-wheat breads and pastas, oatmeal, brown rice, and bulgur.  Eat a variety of vegetables every day.  Include dark, leafy greens such as spinach, kale, deloris greens, and mustard greens. Eat yellow and orange vegetables such as carrots, sweet potatoes, and winter squash.   Eat a variety of fruits every day.  Choose fresh or canned fruit (canned in its own juice or light syrup) instead of juice. Fruit juice has very little or no fiber.  Eat low-fat dairy foods.  Drink fat-free (skim) milk or 1% milk. Eat fat-free yogurt and low-fat cottage cheese. Try low-fat cheeses such as mozzarella and other reduced-fat cheeses.  Choose meat and other protein foods that are low in fat.  Choose beans or other legumes such as split peas or lentils. Choose fish, skinless poultry (chicken or turkey), or lean cuts of red meat (beef or pork). Before you cook meat or poultry, cut off any visible fat.   Use less fat and oil.  Try baking foods instead of frying them. Add less fat, such as margarine, sour cream, regular salad dressing and mayonnaise to foods. Eat fewer high-fat foods. Some examples of high-fat foods  include french fries, doughnuts, ice cream, and cakes.  Eat fewer sweets.  Limit foods and drinks that are high in sugar. This includes candy, cookies, regular soda, and sweetened drinks.  Exercise:  Exercise at least 30 minutes per day on most days of the week. Some examples of exercise include walking, biking, dancing, and swimming. You can also fit in more physical activity by taking the stairs instead of the elevator or parking farther away from stores. Ask your healthcare provider about the best exercise plan for you.      © Copyright [employer] 2018 Information is for End User's use only and may not be sold, redistributed or otherwise used for commercial purposes. All illustrations and images included in CareNotes® are the copyrighted property of [employer] or [employer]

## 2024-09-12 NOTE — PROGRESS NOTES
Assessment/Plan:    1. Type 2 diabetes mellitus with diabetic peripheral angiopathy without gangrene, with long-term current use of insulin ([facility])  Assessment & Plan:  Pt has only taken lantus three times in last month - sugars - have been on low side.  Will stop amyryl, get back on lantus and will follow logs in 5 weeks  Lab Results   Component Value Date    HGBA1C 7.0 (H) 09/03/2024     Orders:  -     IRIS Diabetic eye exam  -     CBC; Future  -     Comprehensive metabolic panel; Future  -     Hemoglobin A1C; Future  -     Albumin / creatinine urine ratio; Future  -     Lipid Panel with Direct LDL reflex; Future  -     Hemoglobin A1C; Future; Expected date: 09/12/2024  2. Primary hypertension  Assessment & Plan:  stable  Orders:  -     CBC; Future  -     Comprehensive metabolic panel; Future  -     Hemoglobin A1C; Future  -     Albumin / creatinine urine ratio; Future  -     Lipid Panel with Direct LDL reflex; Future  3. Stable angina pectoris  Assessment & Plan:  No chest pain at thios t6ime  4. Acute on chronic diastolic heart failure (HCC)  -     CBC; Future  -     Comprehensive metabolic panel; Future  -     Hemoglobin A1C; Future  -     Albumin / creatinine urine ratio; Future  -     Lipid Panel with Direct LDL reflex; Future  5. Coronary artery disease involving native coronary artery of native heart without angina pectoris  -     CBC; Future  -     Comprehensive metabolic panel; Future  -     Hemoglobin A1C; Future  -     Albumin / creatinine urine ratio; Future  -     Lipid Panel with Direct LDL reflex; Future  6. Diabetic ulcer of toe of left foot associated with type 2 diabetes mellitus, limited to breakdown of skin (HCC)  Assessment & Plan:    Seen at wound center            Patient Instructions   Medicare Preventive Visit Patient Instructions  Thank you for completing your Welcome to Medicare Visit or Medicare Annual Wellness Visit today. Your next wellness visit will be due in one year  (9/13/2025).  The screening/preventive services that you may require over the next 5-10 years are detailed below. Some tests may not apply to you based off risk factors and/or age. Screening tests ordered at today's visit but not completed yet may show as past due. Also, please note that scanned in results may not display below.  Preventive Screenings:  Service Recommendations Previous Testing/Comments   Colorectal Cancer Screening  Colonoscopy    Fecal Occult Blood Test (FOBT)/Fecal Immunochemical Test (FIT)  Fecal DNA/Cologuard Test  Flexible Sigmoidoscopy Age: 45-75 years old   Colonoscopy: every 10 years (May be performed more frequently if at higher risk)  OR  FOBT/FIT: every 1 year  OR  Cologuard: every 3 years  OR  Sigmoidoscopy: every 5 years  Screening may be recommended earlier than age 45 if at higher risk for colorectal cancer. Also, an individualized decision between you and your healthcare provider will decide whether screening between the ages of 76-85 would be appropriate. Colonoscopy: 02/14/2024  FOBT/FIT: Not on file  Cologuard: Not on file  Sigmoidoscopy: Not on file          Prostate Cancer Screening Individualized decision between patient and health care provider in men between ages of 55-69   Medicare will cover every 12 months beginning on the day after your 50th birthday PSA: <0.01 ng/mL     History Prostate Cancer  Screening Not Indicated     Hepatitis C Screening Once for adults born between 1945 and 1965  More frequently in patients at high risk for Hepatitis C Hep C Antibody: 01/11/2022    Screening Current   Diabetes Screening 1-2 times per year if you're at risk for diabetes or have pre-diabetes Fasting glucose: 59 mg/dL (9/7/2024)  A1C: 7.0 % (9/3/2024)  Screening Not Indicated  History Diabetes   Cholesterol Screening Once every 5 years if you don't have a lipid disorder. May order more often based on risk factors. Lipid panel: 04/17/2024  Screening Not Indicated  History Lipid  Disorder      Other Preventive Screenings Covered by Medicare:  Abdominal Aortic Aneurysm (AAA) Screening: covered once if your at risk. You're considered to be at risk if you have a family history of AAA or a male between the age of 65-75 who smoking at least 100 cigarettes in your lifetime.  Lung Cancer Screening: covers low dose CT scan once per year if you meet all of the following conditions: (1) Age 55-77; (2) No signs or symptoms of lung cancer; (3) Current smoker or have quit smoking within the last 15 years; (4) You have a tobacco smoking history of at least 20 pack years (packs per day x number of years you smoked); (5) You get a written order from a healthcare provider.  Glaucoma Screening: covered annually if you're considered high risk: (1) You have diabetes OR (2) Family history of glaucoma OR (3)  aged 50 and older OR (4)  American aged 65 and older  Osteoporosis Screening: covered every 2 years if you meet one of the following conditions: (1) Have a vertebral abnormality; (2) On glucocorticoid therapy for more than 3 months; (3) Have primary hyperparathyroidism; (4) On osteoporosis medications and need to assess response to drug therapy.  HIV Screening: covered annually if you're between the age of 15-65. Also covered annually if you are younger than 15 and older than 65 with risk factors for HIV infection. For pregnant patients, it is covered up to 3 times per pregnancy.    Immunizations:  Immunization Recommendations   Influenza Vaccine Annual influenza vaccination during flu season is recommended for all persons aged >= 6 months who do not have contraindications   Pneumococcal Vaccine   * Pneumococcal conjugate vaccine = PCV13 (Prevnar 13), PCV15 (Vaxneuvance), PCV20 (Prevnar 20)  * Pneumococcal polysaccharide vaccine = PPSV23 (Pneumovax) Adults 19-65 yo with certain risk factors or if 65+ yo  If never received any pneumonia vaccine: recommend Prevnar 20 (PCV20)  Give PCV20  if previously received 1 dose of PCV13 or PPSV23   Hepatitis B Vaccine 3 dose series if at intermediate or high risk (ex: diabetes, end stage renal disease, liver disease)   Respiratory syncytial virus (RSV) Vaccine - COVERED BY MEDICARE PART D  * RSVPreF3 (Arexvy) CDC recommends that adults 60 years of age and older may receive a single dose of RSV vaccine using shared clinical decision-making (SCDM)   Tetanus (Td) Vaccine - COST NOT COVERED BY MEDICARE PART B Following completion of primary series, a booster dose should be given every 10 years to maintain immunity against tetanus. Td may also be given as tetanus wound prophylaxis.   Tdap Vaccine - COST NOT COVERED BY MEDICARE PART B Recommended at least once for all adults. For pregnant patients, recommended with each pregnancy.   Shingles Vaccine (Shingrix) - COST NOT COVERED BY MEDICARE PART B  2 shot series recommended in those 19 years and older who have or will have weakened immune systems or those 50 years and older     Health Maintenance Due:      Topic Date Due    Hepatitis C Screening  Completed     Immunizations Due:      Topic Date Due    COVID-19 Vaccine (5 - 2023-24 season) 09/01/2024    Influenza Vaccine (1) 09/01/2024     Advance Directives   What are advance directives?  Advance directives are legal documents that state your wishes and plans for medical care. These plans are made ahead of time in case you lose your ability to make decisions for yourself. Advance directives can apply to any medical decision, such as the treatments you want, and if you want to donate organs.   What are the types of advance directives?  There are many types of advance directives, and each state has rules about how to use them. You may choose a combination of any of the following:  Living will:  This is a written record of the treatment you want. You can also choose which treatments you do not want, which to limit, and which to stop at a certain time. This includes  surgery, medicine, IV fluid, and tube feedings.   Durable power of  for healthcare (DPAHC):  This is a written record that states who you want to make healthcare choices for you when you are unable to make them for yourself. This person, called a proxy, is usually a family member or a friend. You may choose more than 1 proxy.  Do not resuscitate (DNR) order:  A DNR order is used in case your heart stops beating or you stop breathing. It is a request not to have certain forms of treatment, such as CPR. A DNR order may be included in other types of advance directives.  Medical directive:  This covers the care that you want if you are in a coma, near death, or unable to make decisions for yourself. You can list the treatments you want for each condition. Treatment may include pain medicine, surgery, blood transfusions, dialysis, IV or tube feedings, and a ventilator (breathing machine).  Values history:  This document has questions about your views, beliefs, and how you feel and think about life. This information can help others choose the care that you would choose.  Why are advance directives important?  An advance directive helps you control your care. Although spoken wishes may be used, it is better to have your wishes written down. Spoken wishes can be misunderstood, or not followed. Treatments may be given even if you do not want them. An advance directive may make it easier for your family to make difficult choices about your care.   Weight Management   Why it is important to manage your weight:  Being overweight increases your risk of health conditions such as heart disease, high blood pressure, type 2 diabetes, and certain types of cancer. It can also increase your risk for osteoarthritis, sleep apnea, and other respiratory problems. Aim for a slow, steady weight loss. Even a small amount of weight loss can lower your risk of health problems.  How to lose weight safely:  A safe and healthy way to lose  weight is to eat fewer calories and get regular exercise. You can lose up about 1 pound a week by decreasing the number of calories you eat by 500 calories each day.   Healthy meal plan for weight management:  A healthy meal plan includes a variety of foods, contains fewer calories, and helps you stay healthy. A healthy meal plan includes the following:  Eat whole-grain foods more often.  A healthy meal plan should contain fiber. Fiber is the part of grains, fruits, and vegetables that is not broken down by your body. Whole-grain foods are healthy and provide extra fiber in your diet. Some examples of whole-grain foods are whole-wheat breads and pastas, oatmeal, brown rice, and bulgur.  Eat a variety of vegetables every day.  Include dark, leafy greens such as spinach, kale, deloris greens, and mustard greens. Eat yellow and orange vegetables such as carrots, sweet potatoes, and winter squash.   Eat a variety of fruits every day.  Choose fresh or canned fruit (canned in its own juice or light syrup) instead of juice. Fruit juice has very little or no fiber.  Eat low-fat dairy foods.  Drink fat-free (skim) milk or 1% milk. Eat fat-free yogurt and low-fat cottage cheese. Try low-fat cheeses such as mozzarella and other reduced-fat cheeses.  Choose meat and other protein foods that are low in fat.  Choose beans or other legumes such as split peas or lentils. Choose fish, skinless poultry (chicken or turkey), or lean cuts of red meat (beef or pork). Before you cook meat or poultry, cut off any visible fat.   Use less fat and oil.  Try baking foods instead of frying them. Add less fat, such as margarine, sour cream, regular salad dressing and mayonnaise to foods. Eat fewer high-fat foods. Some examples of high-fat foods include french fries, doughnuts, ice cream, and cakes.  Eat fewer sweets.  Limit foods and drinks that are high in sugar. This includes candy, cookies, regular soda, and sweetened drinks.  Exercise:   Exercise at least 30 minutes per day on most days of the week. Some examples of exercise include walking, biking, dancing, and swimming. You can also fit in more physical activity by taking the stairs instead of the elevator or parking farther away from stores. Ask your healthcare provider about the best exercise plan for you.      © Copyright [employer] 2018 Information is for End User's use only and may not be sold, redistributed or otherwise used for commercial purposes. All illustrations and images included in CareNotes® are the copyrighted property of [email or web address]. or [employer]       Return in about 5 weeks (around 10/17/2024).    Subjective:      Patient ID: Thomas Horne is a 79 y.o. male.    Chief Complaint   Patient presents with    Medicare Wellness Visit     CMA        pT IS SCHED FOR A HOSPITAL FOLLOW UP  Nurses tcm note appreciated  Pt states he went in for surgery on the 30th - Fem /pop  Pt was discharged on the 4th or so  Pt states he developed developed SOB and went back to the hospital  Pt had nausea and dizziness and he was seeing spots    Pt was admitted - exac off CHF    Pt has been weighing himself every day in am his weight this am was 225  Pt baseline weight is 230     Pt was seeing podaitry duening a previous admissuibn - uylcer on left foot - 3 of them - now being seen at the wound center and has vising nurses.      Pt has not been SOB since his discharge - so SOB is at baseline now - struggeling with the foot and stairs given the wound.    Pt states for th e last month his sugars have been running very well - he did have - a low blood sugar event in the 50's has this once or twice a month  Pt states he only uses his lantus if its over 170          The following portions of the patient's history were reviewed and updated as appropriate: allergies, current medications, past family history, past medical history, past social history, past surgical history and problem  list.    Review of Systems   Constitutional:  Negative for activity change, appetite change, chills, diaphoresis, fatigue, fever and unexpected weight change.   HENT:  Negative for congestion, dental problem, ear pain, mouth sores, sinus pressure, sinus pain, sore throat and trouble swallowing.    Eyes:  Negative for photophobia, discharge and itching.   Respiratory:  Negative for apnea, chest tightness and shortness of breath.    Cardiovascular:  Negative for chest pain, palpitations and leg swelling.   Gastrointestinal:  Negative for abdominal distention, abdominal pain, blood in stool, nausea and vomiting.   Endocrine: Negative for cold intolerance, heat intolerance, polydipsia, polyphagia and polyuria.   Genitourinary:  Negative for difficulty urinating.   Musculoskeletal:  Negative for arthralgias.   Skin:  Negative for color change and wound.   Neurological:  Negative for dizziness, syncope, speech difficulty and headaches.   Hematological:  Negative for adenopathy.   Psychiatric/Behavioral:  Negative for agitation and behavioral problems.          Current Outpatient Medications   Medication Sig Dispense Refill    acetaminophen (TYLENOL) 325 mg tablet Take 2 tablets (650 mg total) by mouth every 6 (six) hours as needed for mild pain      amLODIPine (NORVASC) 10 mg tablet Take 0.5 tablets (5 mg total) by mouth daily 45 tablet 1    aspirin 81 mg chewable tablet Chew 81 mg daily      atorvastatin (LIPITOR) 40 mg tablet Take 1 tablet (40 mg total) by mouth daily 90 tablet 1    Blood Glucose Monitoring Suppl (OneTouch Verio Reflect) w/Device KIT Check blood sugars twice daily. Please substitute with appropriate alternative as covered by patient's insurance. Dx: E11.65 1 kit 0    clopidogrel (PLAVIX) 75 mg tablet Take 1 tablet (75 mg total) by mouth daily 30 tablet 3    Droplet Pen Needles 32G X 4 MM MISC USE EVERY EVENING 100 each 5    DULoxetine (CYMBALTA) 30 mg delayed release capsule take 1 capsule by mouth once  daily 90 capsule 0    Empagliflozin (Jardiance) 25 MG TABS TAKE 1 TABLET BY MOUTH DAILY 90 tablet 1    fenofibrate 160 MG tablet TAKE 1 TABLET BY MOUTH DAILY 100 tablet 1    glucose blood (OneTouch Verio) test strip TEST TWICE A  strip 5    insulin glargine (LANTUS) 100 units/mL subcutaneous injection Inject 15 Units under the skin daily at bedtime (Patient taking differently: Inject 15 Units under the skin daily at bedtime If BS > 150) 10 mL 0    isosorbide mononitrate (IMDUR) 30 mg 24 hr tablet Take 3 tablets (90 mg total) by mouth daily 270 tablet 1    metFORMIN (GLUCOPHAGE) 500 mg tablet Take 1 tablet (500 mg total) by mouth 2 (two) times a day with meals 180 tablet 1    metoprolol tartrate (LOPRESSOR) 50 mg tablet Take 0.5 tablets (25 mg total) by mouth every 12 (twelve) hours (Patient taking differently: Take 25 mg by mouth 2 (two) times a day) 90 tablet 1    Multiple Vitamins-Minerals (MULTIVITAMIN MEN 50+ PO) Take by mouth daily      nitroglycerin (NITROSTAT) 0.4 mg SL tablet Place 1 tablet (0.4 mg total) under the tongue every 5 (five) minutes as needed for chest pain 30 tablet 0    Omega-3 Fatty Acids (fish oil) 1,000 mg Take 4,000 mg by mouth 2 (two) times a day      ondansetron (ZOFRAN) 8 mg tablet Take 0.5 tablets (4 mg total) by mouth every 6 (six) hours as needed for nausea or vomiting 20 tablet 0    OneTouch Delica Lancets 33G MISC Check blood sugars twice daily. Please substitute with appropriate alternative as covered by patient's insurance. Dx: E11.65 200 each 3    oxyCODONE-acetaminophen (Percocet) 5-325 mg per tablet Take 1 tablet by mouth 2 (two) times a day as needed for moderate pain or severe pain Max Daily Amount: 2 tablets 60 tablet 0    pantoprazole (PROTONIX) 40 mg tablet Take 1 tablet (40 mg total) by mouth daily 90 tablet 1    pentoxifylline (TRENtal) 400 mg ER tablet Take 1 tablet (400 mg total) by mouth 3 (three) times a day with meals 90 tablet 2    pregabalin (LYRICA) 150 mg  "capsule Take 1 capsule (150 mg total) by mouth 3 (three) times a day 90 capsule 0    ranolazine (RANEXA) 1000 MG SR tablet Take 1 tablet (1,000 mg total) by mouth 2 (two) times a day 180 tablet 3    sitaGLIPtin (Januvia) 100 mg tablet TAKE 1 TABLET BY MOUTH DAILY 100 tablet 1    torsemide (DEMADEX) 20 mg tablet TAKE 1 TABLET BY MOUTH DAILY 90 tablet 1    cloNIDine (CATAPRES) 0.1 mg tablet take 1 tablet by mouth every 12 hours 180 tablet 1    collagenase (SANTYL) ointment Apply topically daily (Patient not taking: Reported on 9/10/2024) 30 g 0    ketoconazole (NIZORAL) 2 % cream Apply topically daily (Patient not taking: Reported on 9/10/2024) 60 g 1    senna (SENOKOT) 8.6 mg Take 1 tablet (8.6 mg total) by mouth daily Stool softener for constipation-- hold loose stools (Patient not taking: Reported on 9/11/2024)       No current facility-administered medications for this visit.       Objective:    /68   Pulse (!) 50   Temp 97.6 °F (36.4 °C)   Resp 16   Ht 6' 2\" (1.88 m)   Wt 105 kg (231 lb 12.8 oz)   BMI 29.76 kg/m²        Physical Exam  Musculoskeletal:      Comments: Ambulates with a walker   Skin:     Comments: Left foot wound dressed              TCM Call       Date and time call was made  9/5/2024  9:17 AM    Hospital care reviewed  Records reviewed    Patient was hospitial[facility] at  [facility]    Date of Admission  08/30/24    Date of discharge  09/04/24    Diagnosis  Peripheral artery disease    Disposition  Home    Were the patients medications reviewed and updated  No    Current Symptoms  Shortness of breath  Left foot pain, continues but pain is \"seldomly\"  controlled    Shortness of breath severity  Mild          TCM Call       Post hospital issues  Poor medication adherence    Should patient be enrolled in anticoag monitoring?  No    Scheduled for follow up?  Yes    Patients specialists  Other (comment); Cardiologist    Cardiologist name  Naun Bell MD (Cardiology)    Other " specialists names  Podatrist    Other specialists contcat #  Sleep Lab    Did you obtain your prescribed medications  Yes    Do you need help managing your prescriptions or medications  No    Is transportation to your appointment needed  Yes    Specify why  He is not cleared to drive    I have advised the patient to call PCP with any new or worsening symptoms  Orlin Forbes    Living Arrangements  Children    Support System  Family    The type of support provided  Physical; Emotional    Do you have social support  Yes, as much as I need    Are you recieving any outpatient services  Yes    What type of services  Wound Care    Are you recieving home care services  Yes    Types of home care services  Nurse visit    Are you using any community resources  No    Current waiver services  No    Have you fallen in the last 12 months  No    Interperter language line needed  No    Counseling  Patient    Counseling topics  Activities of daily living; Importance of RX compliance; patient and family education; Home health agency benefits; instructions for management; Risk factor reduction    Comments  I spoke with Thomas on 9/10/24, and he  states that he is doing ok. He has continued left foot pain and his podiatrist just changed his pain med which helps a little bit but does not complately resolve his paIn. He knows to go to the ER if any chest pain, dyspnea, severe uncontrolled pain, etc Jamie MoyleLPN Frank Lombardi, DO

## 2024-09-13 ENCOUNTER — TELEPHONE (OUTPATIENT)
Dept: FAMILY MEDICINE CLINIC | Facility: CLINIC | Age: 79
End: 2024-09-13

## 2024-09-14 DIAGNOSIS — I70.209 PERIPHERAL ARTERIOSCLEROSIS (HCC): ICD-10-CM

## 2024-09-16 ENCOUNTER — OFFICE VISIT (OUTPATIENT)
Dept: PHYSICAL THERAPY | Facility: CLINIC | Age: 79
End: 2024-09-16
Payer: COMMERCIAL

## 2024-09-16 ENCOUNTER — OFFICE VISIT (OUTPATIENT)
Dept: WOUND CARE | Facility: HOSPITAL | Age: 79
End: 2024-09-16
Payer: COMMERCIAL

## 2024-09-16 VITALS
TEMPERATURE: 97.5 F | RESPIRATION RATE: 15 BRPM | SYSTOLIC BLOOD PRESSURE: 107 MMHG | HEART RATE: 56 BPM | DIASTOLIC BLOOD PRESSURE: 63 MMHG

## 2024-09-16 DIAGNOSIS — E11.51 TYPE 2 DIABETES MELLITUS WITH DIABETIC PERIPHERAL ANGIOPATHY WITHOUT GANGRENE, WITH LONG-TERM CURRENT USE OF INSULIN (HCC): ICD-10-CM

## 2024-09-16 DIAGNOSIS — L97.522 DIABETIC ULCER OF LEFT FOOT ASSOCIATED WITH TYPE 2 DIABETES MELLITUS, WITH FAT LAYER EXPOSED, UNSPECIFIED PART OF FOOT (HCC): Primary | ICD-10-CM

## 2024-09-16 DIAGNOSIS — R42 DIZZINESS: Primary | ICD-10-CM

## 2024-09-16 DIAGNOSIS — I73.9 PERIPHERAL ARTERY DISEASE (HCC): ICD-10-CM

## 2024-09-16 DIAGNOSIS — E11.621 DIABETIC ULCER OF LEFT FOOT ASSOCIATED WITH TYPE 2 DIABETES MELLITUS, WITH FAT LAYER EXPOSED, UNSPECIFIED PART OF FOOT (HCC): Primary | ICD-10-CM

## 2024-09-16 DIAGNOSIS — Z79.4 TYPE 2 DIABETES MELLITUS WITH DIABETIC PERIPHERAL ANGIOPATHY WITHOUT GANGRENE, WITH LONG-TERM CURRENT USE OF INSULIN (HCC): ICD-10-CM

## 2024-09-16 DIAGNOSIS — H83.2X9 VESTIBULAR HYPOFUNCTION, UNSPECIFIED LATERALITY: ICD-10-CM

## 2024-09-16 DIAGNOSIS — R26.81 GENERAL UNSTEADINESS: ICD-10-CM

## 2024-09-16 PROCEDURE — 11042 DBRDMT SUBQ TIS 1ST 20SQCM/<: CPT | Performed by: SURGERY

## 2024-09-16 PROCEDURE — 97164 PT RE-EVAL EST PLAN CARE: CPT

## 2024-09-16 RX ORDER — PENTOXIFYLLINE 400 MG/1
400 TABLET, EXTENDED RELEASE ORAL
Qty: 270 TABLET | Refills: 3 | Status: SHIPPED | OUTPATIENT
Start: 2024-09-16

## 2024-09-16 RX ORDER — LIDOCAINE HYDROCHLORIDE 40 MG/ML
5 SOLUTION TOPICAL ONCE
Status: COMPLETED | OUTPATIENT
Start: 2024-09-16 | End: 2024-09-16

## 2024-09-16 RX ADMIN — LIDOCAINE HYDROCHLORIDE 5 ML: 40 SOLUTION TOPICAL at 09:10

## 2024-09-16 NOTE — PROGRESS NOTES
Direct Access Evaluation     Today's date: 2024  Patient name: Thomas Horne  : 1945  MRN: [id number]  Referring provider: Tawana Tate PT  Dx:   Encounter Diagnosis     ICD-10-CM    1. Dizziness  R42       2. General unsteadiness  R26.81       3. Vestibular hypofunction, unspecified laterality  H83.2X9                      Assessment  Impairments: abnormal gait, abnormal or restricted ROM, impaired balance, impaired physical strength, lacks appropriate home exercise program, safety issue, poor posture  and poor body mechanics  Functional limitations: limitations secondary to procedure precautions (vascular procedure to LLE)  Symptom irritability: moderate    Assessment details: Thomas Horne is a 79 y.o. male who presents for DA evaluation with unsteadiness, dizziness, nausea, peripheral vision issues, positive oculomotor testing. Positional testing was negative at this time. Due to these impairments, patient has difficulty performing ADL's, recreational activities, engaging in social activities, stair negotiation, transfers. Patient's clinical presentation is consistent with diagnosis of Dizziness  (primary encounter diagnosis), General unsteadiness, Vestibular hypofunction, unspecified laterality. Patient has been educated in home exercise program and plan of care. Patient would benefit from skilled physical therapy services to address their aforementioned functional limitations and progress towards prior level of function and independence with home exercise program. PT recommended patient follow up with optometrist due to vision concerns as they appear unrelated to vestibular symptoms. Patient has appt scheduled for first week in October.     Understanding of Dx/Px/POC: good     Prognosis: good    Goals  Vestibular Short Term Goals:  - Patient will be independent with simple HEP  - Patient will tolerate 60 seconds of oculomotor exercises with minimal increase in symptoms  - Patient will  "demonstrate 10% decrease in symptom severity scoring with independent use of modalities  - Patient will report 2/10 dizziness or less with visual stimulating surround with duration of 2 minutes       Vestibular Long Term Goals:  - Patient will display decreased forward head and rounded shoulders to promote improved resting posture and cervical mobility  - Patient will be independent with complex HEP  - Patient will tolerate >=2 minutes of oculomotor exercises to facilitate return to reading and computer work  - Patient will report >= 50% improvement on symptom severity scoring  - Patient will report baseline dizziness of 1/10 or less   - Patient will report subjective improvement to 90% or higher to promote return to PLOF        Plan  Patient would benefit from: PT eval and skilled physical therapy  Planned modality interventions: cryotherapy and thermotherapy: hydrocollator packs    Planned therapy interventions: balance/weight bearing training, coordination, gait training, home exercise program, therapeutic exercise, therapeutic activities, strengthening, patient education, neuromuscular re-education, abdominal trunk stabilization, IASTM and manual therapy    Frequency: 2-3x week  Plan of Care beginning date: 9/16/2024  Plan of Care expiration date: 12/9/2024  Treatment plan discussed with: patient  Plan details: HEP development, stretching, strengthening, A/AA/PROM, joint mobilizations, posture education, STM/MI as needed to reduce muscle tension, muscle reeducation, PLOC discussed and agreed upon with patient.          Subjective Evaluation    History of Present Illness  Mechanism of injury: Patient reports since having vascular surgery on 8/30/2024. Patient then went back to the hospital on 9/6/24 for SOB, dizziness, nausea, and seeing spots. Patient has had unsteadiness, nausea, visual spots, difficulty focusing. Patient denies any room spinning. \"Sometimes things just seem uneven\" Patient reports the symptoms " "have worsened a bit over time since the surgery. Patient reports his peripheral vision is worse \"like blind spots\"   Patient Goals  Patient goals for therapy: improved balance and increased motion  Patient goal: decrease dizziness and nausea  Pain  At worst pain rating: 3  Location: L foot  Quality: dull ache  Relieving factors: relaxation and rest  Aggravating factors: standing and walking  Progression: no change (has on going wound care to L foot and calf)    Social Support  Stairs in house: yes   15  Lives in: multiple-level home  Lives with: adult children    Employment status: not working  Treatments  Previous treatment: physical therapy (balance)        Objective                                                                      Objective     Dizziness Subjective  How long does dizziness last: constant (can vary in intensity but never goes away)   How would you describe the dizziness: \"like things are uneven/ off balance\"   Rolling in bed: No  Supine to/from sit: Yes (lessens if he sits for a minute)  Tinnitus: Sometimes but does not last long       Vestibular Objective  Cervical Spine AROM:  - Flexion: minimal limitation no pain  - Extension: minimal limitation no pain  - R Rotation: WFL no pain  - L Rotation: WFL no pain  - R Lateral Flexion: minimal limitation no pain  - L Lateral Flexion: minimal limitation no pain    Integrity Testing  - mVBI: neg B/L   - Sharp Radha: neg B/L   - Alar Stability Test: intact   - Posture: mild fwd head       Coordination Screen  - Dysmetria: intact for speed and accuracy   - Dysdiadochokinesia: intact for speed and accuracy     Oculomotor Screen  - Baseline Symptoms: 2-3/10  - Baseline Observation: \"spotty\" \"peripheral vision is funky and I feel nauseous\"   - Gaze Holding Nystagmus: H: Normal  - Spontaneous Nystagmus Room Light: H: Normal   - Smooth Pursuits (central): H: Normal and V: Normal   - Saccades (central): H: Normal and V: Normal   - VORx1: H: Normal " Observation: difficulty coordinating unable to full assess at higher speed   - VOR Cancel (central): H: Abnormal Dizziness: 4/10, Observation: slightly worse   - Head Shaking Test (mild hypofunction): H: Abnormal Dizziness: 4/10, Observation: difficulty relaxing neck       Positional testing   Dunnellon liao pike R: neg  Leti liao pike L:  neg  Roll test R: neg  Roll test L: neg        Outcome Measures Initial Eval  9/16/2024        mCTSIB  - FTEO (firm)  - FTEC (firm)  - FTEO (foam)  - FTEC (foam)   Held due to WB status and vascular surgery          FGA Held due to WB status and vascular surgery         DHI 30/100                                                                 Precautions: Recent vascular surgery to LE with current wounds on feet, h/o AAA repair   Past Medical History:   Diagnosis Date    Anemia     CAD (coronary artery disease)     Callus     Cancer (HCC)     prostate    CHF (congestive heart failure) (HCC)     Chronic kidney disease     Clotting disorder (HCC)     Coronary artery disease 1988    Deep vein thrombosis (HCC)     Diabetes mellitus (HCC)     Difficulty walking     Duodenal ulcer     Heart disease 1988    Hyperlipidemia     Hypertension     Myocardial infarction (HCC)     Neuropathy     Bilateral feet    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)     Plantar fasciitis     Sleep apnea     Could not tolerate CPAP     SOC: 9/16/2024  POC Expiration: 12/9/2024  Daily Treatment Log:  Date 9/16/2024       Visit # 1       Auth         Auth exp        Manual                        There Exer                                                                 HEP Created and discussed        There Activ                                                        NMReed        VOR x1 seated        VOR x2 seated        Smooth pursuits seated         Saccades seated         VOR cancel                                 Modalities                                HEP:   Access Code: [email or web address]  URL:  [email or web address]  Date: 09/16/2024  Prepared by: Tawana Tate    Exercises  - Seated Gaze Stabilization with Head Nod  - 1 x daily - 7 x weekly - 3 reps  - Seated Gaze Stabilization with Head Rotation  - 1 x daily - 7 x weekly - 3 reps  - Seated VOR Cancellation  - 1 x daily - 7 x weekly - 3 reps  - Seated Horizontal Saccades  - 1 x daily - 7 x weekly - 1 sets - 10 reps

## 2024-09-16 NOTE — PATIENT INSTRUCTIONS
Orders Placed This Encounter   Procedures    Wound cleansing and dressings     Left foot wounds:      Hold santyl, do not discard. Store per manufacturers recommendations     Wash your hands with soap and water.  Remove old dressing, discard into plastic bag and place in trash.  Cleanse the wound with dakins moistened gauze prior to applying a clean dressing. Do not use tissue or cotton balls. Do not scrub the wound. Pat dry using gauze.  Shower no, unless able to keep wounds dry.   Apply lotion to skin surrounding wound  Apply polymem max ag to the open wound.    Cover with abd (gauze to toe wound)  Secure with rolled gauze and tape     Change dressing 3 times weekly        Off-loading Instructions:     Wear off-loading device as directed by your physician (michael alcala). Put on immediately when rising in the morning and remove when going to bed and Turn every 2 hours. Avoid position directing pressure to Wound site. Limit side lying to 30 degree tilt. Limit the head of bed elevation to 30 degrees. Limit ambulation to only necessity for activities of daily living.      Wound infection:  If you have signs of infection please call the wound center.  If the wound center is closed- please go to the Emergency department.  Some signs of infection:  fever, chills, increased redness, red streaks, increase in pain, increased drainage.  Drainage with an odor, Change in drainage color: white/milky/green/tan/yellow,  an increase in swelling, chest pain and/or shortness of breath.      Protein: Eat protein with each meal to promote healing.  Examples of protein are fish, meat, chicken, nuts, peanut butter, eggs, lentils, edamame or a protein shake.     [facility] for wound care     Standing Status:   Future     Standing Expiration Date:   9/23/2024

## 2024-09-16 NOTE — LETTER
[facility]  [address] [address]  Phone#  [phone]  Fax#  [phone]    Patient:  Thomas Horne  YOB: 1945  Phone:  [phone]  Date of Visit:  9/16/2024    Orders Placed This Encounter   Procedures   • Wound cleansing and dressings     Left foot wounds:      Hold santyl, do not discard. Store per manufacturers recommendations     Wash your hands with soap and water.  Remove old dressing, discard into plastic bag and place in trash.  Cleanse the wound with dakins moistened gauze prior to applying a clean dressing. Do not use tissue or cotton balls. Do not scrub the wound. Pat dry using gauze.  Shower no, unless able to keep wounds dry.   Apply lotion to skin surrounding wound  Apply polymem max ag to the open wound.    Cover with abd (gauze to toe wound)  Secure with rolled gauze and tape     Change dressing 3 times weekly        Off-loading Instructions:     Wear off-loading device as directed by your physician (darco wedge). Put on immediately when rising in the morning and remove when going to bed and Turn every 2 hours. Avoid position directing pressure to Wound site. Limit side lying to 30 degree tilt. Limit the head of bed elevation to 30 degrees. Limit ambulation to only necessity for activities of daily living.      Wound infection:  If you have signs of infection please call the wound center.  If the wound center is closed- please go to the Emergency department.  Some signs of infection:  fever, chills, increased redness, red streaks, increase in pain, increased drainage.  Drainage with an odor, Change in drainage color: white/milky/green/tan/yellow,  an increase in swelling, chest pain and/or shortness of breath.      Protein: Eat protein with each meal to promote healing.  Examples of protein are fish, meat, chicken, nuts, peanut butter, eggs, lentils, edamame or a protein shake.     [facility] for wound care     Standing  Status:   Future     Standing Expiration Date:   9/23/2024   • Wound Procedure Treatment     This order was created via procedure documentation   • Debridement Diabetic Ulcer Left Heel     This order was created via procedure documentation   • Debridement Diabetic Ulcer Anterior;Left Toe D2, second     This order was created via procedure documentation   • Debridement     This order was created via procedure documentation         Electronically signed by Robert Bloch, MD

## 2024-09-16 NOTE — PROGRESS NOTES
Wound Procedure Treatment    Performed by: Graciela Philip RN  Authorized by: Robert Bloch, MD    Associated wounds:   Wound 08/15/24 Diabetic Ulcer Heel Left  Wound 08/15/24 Diabetic Ulcer Plantar Left;Lateral  Wound 08/15/24 Diabetic Ulcer Toe D2, second Anterior;Left  Wound cleansed with:  NSS  Applied to periwound:  Moisture lotion  Applied primary dressing:  Polymem foam and Silver  Applied secondary dressing:  ABD  Dressing secured with:  Ender and Tape

## 2024-09-16 NOTE — PROGRESS NOTES
"Patient ID: Thomas Horne is a 79 y.o. male Date of Birth 1945     Chief Complaint  Chief Complaint   Patient presents with    Follow Up Wound Care Visit     Left foot/toe       Allergies  Lisinopril    Assessment: The foot is warm and he has good dopplerable pulses.  This allowed me to do a significant debridement of the 3 ulcers.  2 of them are bleeding quite well.  Continue present care    No problem-specific Assessment & Plan notes found for this encounter.       Diagnoses and all orders for this visit:    Diabetic ulcer of left foot associated with type 2 diabetes mellitus, with fat layer exposed, unspecified part of foot (HCC)  -     lidocaine (XYLOCAINE) 4 % topical solution 5 mL  -     Wound cleansing and dressings; Future    Peripheral artery disease (HCC)  -     lidocaine (XYLOCAINE) 4 % topical solution 5 mL  -     Wound cleansing and dressings; Future    Type 2 diabetes mellitus with diabetic peripheral angiopathy without gangrene, with long-term current use of insulin (HCC)  -     lidocaine (XYLOCAINE) 4 % topical solution 5 mL  -     Wound cleansing and dressings; Future    Other orders  -     Wound Procedure Treatment              Debridement   Wound 08/15/24 Diabetic Ulcer Heel Left    Universal Protocol:  Consent: Written consent obtained.  Consent given by: patient  Time out: Immediately prior to procedure a \"time out\" was called to verify the correct patient, procedure, equipment, support staff and site/side marked as required.  Patient identity confirmed: verbally with patient    Debridement Details  Performed by: physician  Debridement type: surgical  Level of debridement: subcutaneous tissue  Pain control: lidocaine 4%      Post-debridement measurements  Length (cm): 2  Width (cm): 3.2  Depth (cm): 0.9  Percent debrided: 100%  Surface Area (cm^2): 6.4  Area Debrided (cm^2): 6.4  Volume (cm^3): 5.76    Tissue and other material debrided: adipose and subcutaneous tissue  Devitalized tissue " debrided: biofilm, necrotic debris and slough  Instrument(s) utilized: scissors  Technique utilized: excisionalBleeding: small  Hemostasis obtained with: pressure  Procedural pain (0-10): 2  Post-procedural pain: 2   Response to treatment: procedure was tolerated well  Debridement Comments: The patient was identified by me laying in the supine position on a examination table in the wound center.  The area has been cleansed and 4% lidocaine applied by the nurses.  Pickup and scissors was used to do a extensive excisional debridement.  There was bleeding at the end of the procedure.  Wound was then dressed by the nurse  Debridement   Wound 08/15/24 Diabetic Ulcer Toe D2, second Anterior;Left      Debridement Details  Debridement type: surgical  Level of debridement: subcutaneous tissue  Pain control: lidocaine 4%      Post-debridement measurements  Length (cm): 1  Width (cm): 0.7  Depth (cm): 0.4  Percent debrided: 100%  Surface Area (cm^2): 0.7  Area Debrided (cm^2): 0.7  Volume (cm^3): 0.28    Tissue and other material debrided: adipose and subcutaneous tissue  Devitalized tissue debrided: necrotic debris and slough  Instrument(s) utilized: scissors  Technique utilized: excisionalBleeding: small  Hemostasis obtained with: pressure  Procedural pain (0-10): insensate  Post-procedural pain: 3     Debridement      Debridement Details  Performed by: physician  Debridement type: surgical  Level of debridement: subcutaneous tissue  Pain control: lidocaine 2%      Post-debridement measurements  Length (cm): 0.4  Width (cm): 0.4  Depth (cm): 0.2  Percent debrided: 100%  Surface Area (cm^2): 0.16  Area Debrided (cm^2): 0.16  Volume (cm^3): 0.03    Tissue and other material debrided: adipose and subcutaneous tissue  Devitalized tissue debrided: necrotic debris  Instrument(s) utilized: scissors  Technique utilized: excisionalBleeding: small  Hemostasis obtained with: pressure  Procedural pain (0-10): insensate  Post-procedural  pain: 3         Plan: Continue present care.  Return 1 week     Wound 08/15/24 Diabetic Ulcer Plantar Left;Lateral (Active)   Enter Sumner score: Sumner Grade 2: Deep ulcer extended to ligament, tendon, joint capsule, bone, or deep fascia without abscess or osteomyelitis (OM) 09/16/24 0905   Wound Image Images linked 09/16/24 0905   Wound Description Pink;Yellow 09/16/24 0905   Clara-wound Assessment Dry;Scaly 09/16/24 0905   Wound Length (cm) 1 cm 09/16/24 0905   Wound Width (cm) 0.7 cm 09/16/24 0905   Wound Depth (cm) 0.1 cm 09/16/24 0905   Wound Surface Area (cm^2) 0.7 cm^2 09/16/24 0905   Wound Volume (cm^3) 0.07 cm^3 09/16/24 0905   Calculated Wound Volume (cm^3) 0.07 cm^3 09/16/24 0905   Change in Wound Size % 12.5 09/16/24 0905   Drainage Amount Small 09/16/24 0905   Drainage Description Serosanguineous;Yellow 09/16/24 0905   Non-staged Wound Description Full thickness 09/16/24 0905   Dressing Status Intact (upon arrival) 09/16/24 0905       Wound 08/15/24 Diabetic Ulcer Heel Left (Active)   Date First Assessed/Time First Assessed: 08/15/24 0943   Primary Wound Type: Diabetic Ulcer  Location: Heel  Wound Location Orientation: Left       Wound 08/15/24 Diabetic Ulcer Plantar Left;Lateral (Active)   Date First Assessed/Time First Assessed: 08/15/24 0943   Primary Wound Type: Diabetic Ulcer  Location: Plantar  Wound Location Orientation: Left;Lateral       Wound 08/15/24 Diabetic Ulcer Toe D2, second Anterior;Left (Active)   Date First Assessed/Time First Assessed: 08/15/24 0943   Primary Wound Type: Diabetic Ulcer  Location: Toe D2, second  Wound Location Orientation: Anterior;Left       Wound 08/30/24 Leg Left (Active)   Date First Assessed/Time First Assessed: 08/30/24 1124   Location: Leg  Wound Location Orientation: Left  Wound Description (Comments): UPPER LEG AND LOWER LEG- MEPILEX  Incision's 1st Dressing: DRESSING MEPILEX AG BORDER POST-OP 4 X 8 IN (x2)       Wound 09/06/24 Pretibial Left;Proximal  (Active)   Date First Assessed/Time First Assessed: 09/06/24 1751   Location: Pretibial  Wound Location Orientation: Left;Proximal       [REMOVED] Wound 12/21/23 Diabetic Ulcer Ankle Posterior;Right (Removed)   Resolved Date: 08/15/24  Date First Assessed/Time First Assessed: 12/21/23 0954   Primary Wound Type: Diabetic Ulcer  Location: Ankle  Wound Location Orientation: Posterior;Right  Wound Description (Comments): SUMNER 2  Wound Outcome: Unknown (No long...       [REMOVED] Wound 08/07/24 Groin Left (Removed)   Resolved Date: 08/22/24  Date First Assessed/Time First Assessed: 08/07/24 0927   Location: Groin  Wound Location Orientation: Left  Incision's 1st Dressing: ADHESIVE SKIN HIGH VISCOSITY EXOFIN PRECISION PEN (x0)  Wound Outcome: Unknown (No longer pre...       [REMOVED] Wound 09/06/24 Thigh Anterior;Left (Removed)   Resolved Date: 09/16/24  Date First Assessed/Time First Assessed: 09/06/24 1752   Location: Thigh  Wound Location Orientation: Anterior;Left  Wound Outcome: (c) Other (Comment)       [REMOVED] Wound 09/06/24 Ankle Anterior;Left (Removed)   Resolved Date: 09/09/24  Date First Assessed/Time First Assessed: 09/06/24 1804   Location: Ankle  Wound Location Orientation: Anterior;Left  Wound Outcome: Unknown (No longer present)       [REMOVED] Wound 09/06/24 Toe D2, second Anterior;Left (Removed)   Resolved Date: 09/09/24  Date First Assessed/Time First Assessed: 09/06/24 1804   Location: Toe D2, second  Wound Location Orientation: Anterior;Left  Wound Outcome: Unknown (No longer present)       [REMOVED] Wound 09/06/24 Pedal Anterior;Left (Removed)   Resolved Date: 09/09/24  Date First Assessed/Time First Assessed: 09/06/24 1805   Location: Pedal  Wound Location Orientation: Anterior;Left  Wound Outcome: Unknown (No longer present)       Subjective:      .    HPI: The patient is an 79-year-old white male who is being followed because of 3 Sumner 2-3 diabetic foot ulcers on the left lower extremity.   Approximately 3 to 3-1/2 weeks ago he underwent a left femoropopliteal bypass.  This was sewn in to the popliteal artery inferior to the knee and was done with PTFE.  Since the surgery the patient states that the pain that he had which was excruciating on his heel is completely gone    The following portions of the patient's history were reviewed and updated as appropriate: allergies, current medications, past family history, past medical history, past social history, past surgical history, and problem list.    Review of Systems      Objective:       Wound 08/15/24 Diabetic Ulcer Plantar Left;Lateral (Active)   Enter Sumner score: Sumner Grade 2: Deep ulcer extended to ligament, tendon, joint capsule, bone, or deep fascia without abscess or osteomyelitis (OM) 09/16/24 0905   Wound Image Images linked 09/16/24 0905   Wound Description Pink;Yellow 09/16/24 0905   Clara-wound Assessment Dry;Scaly 09/16/24 0905   Wound Length (cm) 1 cm 09/16/24 0905   Wound Width (cm) 0.7 cm 09/16/24 0905   Wound Depth (cm) 0.1 cm 09/16/24 0905   Wound Surface Area (cm^2) 0.7 cm^2 09/16/24 0905   Wound Volume (cm^3) 0.07 cm^3 09/16/24 0905   Calculated Wound Volume (cm^3) 0.07 cm^3 09/16/24 0905   Change in Wound Size % 12.5 09/16/24 0905   Drainage Amount Small 09/16/24 0905   Drainage Description Serosanguineous;Yellow 09/16/24 0905   Non-staged Wound Description Full thickness 09/16/24 0905   Dressing Status Intact (upon arrival) 09/16/24 0905       /63   Pulse 56   Temp 97.5 °F (36.4 °C)   Resp 15     Physical Exam please see above for the wound sizes.  The heel was quite necrotic and was debrided for about 8 mm to 9 mm depth.  Dead skin was removed before doing an actual debridement.  Same can be said for the other 2 ulcers.  The ulcer on the lateral surface of the foot and on the heel both bled.    Wound Instructions: Continue present care  No orders of the defined types were placed in this encounter.      No diagnosis  found.

## 2024-09-17 ENCOUNTER — OFFICE VISIT (OUTPATIENT)
Age: 79
End: 2024-09-17
Payer: COMMERCIAL

## 2024-09-17 ENCOUNTER — OFFICE VISIT (OUTPATIENT)
Dept: UROLOGY | Facility: CLINIC | Age: 79
End: 2024-09-17
Payer: COMMERCIAL

## 2024-09-17 VITALS
HEART RATE: 67 BPM | BODY MASS INDEX: 28.75 KG/M2 | DIASTOLIC BLOOD PRESSURE: 70 MMHG | SYSTOLIC BLOOD PRESSURE: 110 MMHG | HEIGHT: 74 IN | OXYGEN SATURATION: 98 % | WEIGHT: 224 LBS

## 2024-09-17 VITALS
DIASTOLIC BLOOD PRESSURE: 67 MMHG | HEART RATE: 68 BPM | RESPIRATION RATE: 17 BRPM | SYSTOLIC BLOOD PRESSURE: 115 MMHG | WEIGHT: 224 LBS | HEIGHT: 74 IN | BODY MASS INDEX: 28.75 KG/M2

## 2024-09-17 DIAGNOSIS — E11.42 DIABETIC POLYNEUROPATHY ASSOCIATED WITH TYPE 2 DIABETES MELLITUS (HCC): ICD-10-CM

## 2024-09-17 DIAGNOSIS — E11.621 DIABETIC ULCER OF LEFT HEEL ASSOCIATED WITH TYPE 2 DIABETES MELLITUS, WITH FAT LAYER EXPOSED (HCC): Primary | ICD-10-CM

## 2024-09-17 DIAGNOSIS — B35.1 ONYCHOMYCOSIS: ICD-10-CM

## 2024-09-17 DIAGNOSIS — M79.672 LEFT FOOT PAIN: ICD-10-CM

## 2024-09-17 DIAGNOSIS — E11.621 DIABETIC ULCER OF TOE OF LEFT FOOT ASSOCIATED WITH TYPE 2 DIABETES MELLITUS, LIMITED TO BREAKDOWN OF SKIN (HCC): ICD-10-CM

## 2024-09-17 DIAGNOSIS — E11.621 DIABETIC ULCER OF LEFT MIDFOOT ASSOCIATED WITH TYPE 2 DIABETES MELLITUS, LIMITED TO BREAKDOWN OF SKIN (HCC): ICD-10-CM

## 2024-09-17 DIAGNOSIS — L97.521 DIABETIC ULCER OF TOE OF LEFT FOOT ASSOCIATED WITH TYPE 2 DIABETES MELLITUS, LIMITED TO BREAKDOWN OF SKIN (HCC): ICD-10-CM

## 2024-09-17 DIAGNOSIS — I70.209 PERIPHERAL ARTERIOSCLEROSIS (HCC): ICD-10-CM

## 2024-09-17 DIAGNOSIS — L97.421 DIABETIC ULCER OF LEFT MIDFOOT ASSOCIATED WITH TYPE 2 DIABETES MELLITUS, LIMITED TO BREAKDOWN OF SKIN (HCC): ICD-10-CM

## 2024-09-17 DIAGNOSIS — N39.3 STRESS INCONTINENCE OF URINE: Primary | ICD-10-CM

## 2024-09-17 DIAGNOSIS — L97.422 DIABETIC ULCER OF LEFT HEEL ASSOCIATED WITH TYPE 2 DIABETES MELLITUS, WITH FAT LAYER EXPOSED (HCC): Primary | ICD-10-CM

## 2024-09-17 PROCEDURE — 99214 OFFICE O/P EST MOD 30 MIN: CPT | Performed by: PODIATRIST

## 2024-09-17 PROCEDURE — 99213 OFFICE O/P EST LOW 20 MIN: CPT | Performed by: UROLOGY

## 2024-09-17 NOTE — PROGRESS NOTES
Assessment/Plan: Diabetic foot ulcer x 3 left foot.  Diabetic neuropathy.  Peripheral artery disease.  Pain.    Plan.  Chart reviewed.  PCP notes reviewed.  Wound healing center notes reviewed.  Patient examined.  At this time we agree with present treatment regimen.  Patient is to continue q. OD dressing changes.  Watch for signs of infection.  At this time we recommend change to Santyl.  Patient will consider.  Continue VNA.  Patient will follow with vascular surgery.  Patient will follow with wound healing center.  Watch for signs of infection.  Diabetic foot exam performed.  Patient educated on care of the diabetic foot.  Aftercare instruction given.  Watch for signs of infection or further ulceration.       Diagnoses and all orders for this visit:    Diabetic ulcer of left heel associated with type 2 diabetes mellitus, with fat layer exposed (HCC)    Diabetic ulcer of toe of left foot associated with type 2 diabetes mellitus, limited to breakdown of skin (HCC)  -     Ambulatory Referral to Podiatry    Diabetic ulcer of left midfoot associated with type 2 diabetes mellitus, limited to breakdown of skin (HCC)    Peripheral arteriosclerosis (HCC)    Diabetic polyneuropathy associated with type 2 diabetes mellitus (HCC)    Left foot pain    Onychomycosis          Subjective: Patient is diabetic.  He has peripheral artery disease.  Patient is status post left lower extremity arterial bypass.  He has much less pain in the leg.  He is attending wound healing sessions.  He gets home VNA.  He has no history of fever or night sweats at this time.            Allergies   Allergen Reactions    Lisinopril Rash and Lip Swelling         Current Outpatient Medications:     acetaminophen (TYLENOL) 325 mg tablet, Take 2 tablets (650 mg total) by mouth every 6 (six) hours as needed for mild pain, Disp: , Rfl:     amLODIPine (NORVASC) 10 mg tablet, Take 0.5 tablets (5 mg total) by mouth daily, Disp: 45 tablet, Rfl: 1    aspirin 81  mg chewable tablet, Chew 81 mg daily, Disp: , Rfl:     atorvastatin (LIPITOR) 40 mg tablet, Take 1 tablet (40 mg total) by mouth daily, Disp: 90 tablet, Rfl: 1    Blood Glucose Monitoring Suppl (OneTouch Verio Reflect) w/Device KIT, Check blood sugars twice daily. Please substitute with appropriate alternative as covered by patient's insurance. Dx: E11.65, Disp: 1 kit, Rfl: 0    cloNIDine (CATAPRES) 0.1 mg tablet, take 1 tablet by mouth every 12 hours, Disp: 180 tablet, Rfl: 1    clopidogrel (PLAVIX) 75 mg tablet, Take 1 tablet (75 mg total) by mouth daily, Disp: 30 tablet, Rfl: 3    collagenase (SANTYL) ointment, Apply topically daily (Patient not taking: Reported on 9/10/2024), Disp: 30 g, Rfl: 0    Droplet Pen Needles 32G X 4 MM MISC, USE EVERY EVENING, Disp: 100 each, Rfl: 5    DULoxetine (CYMBALTA) 30 mg delayed release capsule, take 1 capsule by mouth once daily, Disp: 90 capsule, Rfl: 0    Empagliflozin (Jardiance) 25 MG TABS, TAKE 1 TABLET BY MOUTH DAILY, Disp: 90 tablet, Rfl: 1    fenofibrate 160 MG tablet, TAKE 1 TABLET BY MOUTH DAILY, Disp: 100 tablet, Rfl: 1    glucose blood (OneTouch Verio) test strip, TEST TWICE A DAY, Disp: 200 strip, Rfl: 5    insulin glargine (LANTUS) 100 units/mL subcutaneous injection, Inject 15 Units under the skin daily at bedtime (Patient taking differently: Inject 15 Units under the skin daily at bedtime If BS > 150), Disp: 10 mL, Rfl: 0    isosorbide mononitrate (IMDUR) 30 mg 24 hr tablet, Take 3 tablets (90 mg total) by mouth daily, Disp: 270 tablet, Rfl: 1    ketoconazole (NIZORAL) 2 % cream, Apply topically daily (Patient not taking: Reported on 9/17/2024), Disp: 60 g, Rfl: 1    metFORMIN (GLUCOPHAGE) 500 mg tablet, Take 1 tablet (500 mg total) by mouth 2 (two) times a day with meals, Disp: 180 tablet, Rfl: 1    metoprolol tartrate (LOPRESSOR) 50 mg tablet, Take 0.5 tablets (25 mg total) by mouth every 12 (twelve) hours (Patient taking differently: Take 25 mg by mouth 2  (two) times a day), Disp: 90 tablet, Rfl: 1    Multiple Vitamins-Minerals (MULTIVITAMIN MEN 50+ PO), Take by mouth daily, Disp: , Rfl:     nitroglycerin (NITROSTAT) 0.4 mg SL tablet, Place 1 tablet (0.4 mg total) under the tongue every 5 (five) minutes as needed for chest pain, Disp: 30 tablet, Rfl: 0    Omega-3 Fatty Acids (fish oil) 1,000 mg, Take 4,000 mg by mouth 2 (two) times a day, Disp: , Rfl:     ondansetron (ZOFRAN) 8 mg tablet, Take 0.5 tablets (4 mg total) by mouth every 6 (six) hours as needed for nausea or vomiting, Disp: 20 tablet, Rfl: 0    OneTouch Delica Lancets 33G MISC, Check blood sugars twice daily. Please substitute with appropriate alternative as covered by patient's insurance. Dx: E11.65, Disp: 200 each, Rfl: 3    oxyCODONE-acetaminophen (Percocet) 5-325 mg per tablet, Take 1 tablet by mouth 2 (two) times a day as needed for moderate pain or severe pain Max Daily Amount: 2 tablets, Disp: 60 tablet, Rfl: 0    pantoprazole (PROTONIX) 40 mg tablet, Take 1 tablet (40 mg total) by mouth daily, Disp: 90 tablet, Rfl: 1    pentoxifylline (TRENtal) 400 mg ER tablet, TAKE 1 TABLET BY MOUTH 3 TIMES  DAILY WITH MEALS, Disp: 270 tablet, Rfl: 3    pregabalin (LYRICA) 150 mg capsule, Take 1 capsule (150 mg total) by mouth 3 (three) times a day, Disp: 90 capsule, Rfl: 0    ranolazine (RANEXA) 1000 MG SR tablet, Take 1 tablet (1,000 mg total) by mouth 2 (two) times a day, Disp: 180 tablet, Rfl: 3    senna (SENOKOT) 8.6 mg, Take 1 tablet (8.6 mg total) by mouth daily Stool softener for constipation-- hold loose stools (Patient not taking: Reported on 9/11/2024), Disp: , Rfl:     sitaGLIPtin (Januvia) 100 mg tablet, TAKE 1 TABLET BY MOUTH DAILY, Disp: 100 tablet, Rfl: 1    torsemide (DEMADEX) 20 mg tablet, TAKE 1 TABLET BY MOUTH DAILY, Disp: 90 tablet, Rfl: 1    Patient Active Problem List   Diagnosis    Mixed hyperlipidemia    Primary hypertension    Coronary artery disease involving native coronary artery of  native heart without angina pectoris    S/P angioplasty with stent    Hx of CABG    S/P AAA repair    S/P prostatectomy    H/O prostate cancer    Type 2 diabetes mellitus with diabetic polyneuropathy, with long-term current use of insulin (HCC)    Varicose veins of left lower extremity    History of endovascular stent graft for abdominal aortic aneurysm (AAA)    Bruit (arterial)    Peripheral artery disease (HCC)    Type 2 diabetes mellitus with diabetic peripheral angiopathy without gangrene, with long-term current use of insulin (HCC)    Actinic keratoses    Acute kidney injury superimposed on chronic kidney disease  (HCC)    Platelets decreased (HCC)    Gross hematuria    Chronic HFpEF/Moderate pHTN (HFpEF) (HCC)    Mild aortic stenosis    Chronic kidney disease-mineral and bone disorder    Stable angina pectoris    Hypertensive urgency    Elevated troponin level not due myocardial infarction    Diabetic ulcer of right midfoot associated with diabetes mellitus due to underlying condition, limited to breakdown of skin (HCC)    Acute on chronic diastolic heart failure (HCC)    Stage 3b chronic kidney disease (HCC)    Frequent PVCs    Melena    Symptomatic anemia    Multiple gastric ulcers    Iron deficiency anemia due to chronic blood loss    Duodenal ulcer    History of colon polyps    Shortness of breath    Iron deficiency anemia    Acute deep vein thrombosis (DVT) of left peroneal vein (HCC)    Plantar fasciitis, right    Diabetic ulcer of toe of left foot associated with type 2 diabetes mellitus, limited to breakdown of skin (HCC)    History of DVT (deep vein thrombosis)    SOB (shortness of breath)    Nausea    Elevated troponin    Foot ulcer (HCC)          Patient ID: Thomas Horne is a 79 y.o. male.    HPI    The following portions of the patient's history were reviewed and updated as appropriate: He  has a past medical history of Anemia, CAD (coronary artery disease), Callus, Cancer (HCC), CHF (congestive  heart failure) ([facility]), Chronic kidney disease, Clotting disorder ([facility]), Coronary artery disease (1988), Deep vein thrombosis ([facility]), Diabetes mellitus ([facility]), Difficulty walking, Duodenal ulcer, Heart disease (1988), Hyperlipidemia, Hypertension, Myocardial infarction ([facility]), Neuropathy, Neuropathy in diabetes ([facility]), Plantar fasciitis, and Sleep apnea.  He   Patient Active Problem List    Diagnosis Date Noted    Foot ulcer ([facility]) 09/07/2024    SOB (shortness of breath) 09/06/2024    Nausea 09/06/2024    Elevated troponin 09/06/2024    History of DVT (deep vein thrombosis) 08/26/2024    Diabetic ulcer of toe of left foot associated with type 2 diabetes mellitus, limited to breakdown of skin ([facility]) 08/08/2024    Plantar fasciitis, right 07/10/2024    Acute deep vein thrombosis (DVT) of left peroneal vein ([facility]) 05/01/2024    Iron deficiency anemia 04/23/2024    Shortness of breath 04/09/2024    History of colon polyps 02/22/2024    Duodenal ulcer 02/01/2024    Multiple gastric ulcers 12/27/2023    Iron deficiency anemia due to chronic blood loss 12/27/2023    Melena 12/26/2023    Symptomatic anemia 12/26/2023    Frequent PVCs 06/17/2023    Acute on chronic diastolic heart failure ([facility]) 06/16/2023    Stage 3b chronic kidney disease ([facility]) 06/16/2023    Diabetic ulcer of right midfoot associated with diabetes mellitus due to underlying condition, limited to breakdown of skin ([facility]) 06/15/2023    Hypertensive urgency 05/11/2023    Elevated troponin level not due myocardial infarction 05/11/2023    Stable angina pectoris 03/07/2023    Chronic kidney disease-mineral and bone disorder 11/30/2022    Mild aortic stenosis 11/11/2022    Chronic HFpEF/Moderate pHTN (HFpEF) ([facility]) 11/10/2022    Gross hematuria 07/26/2022    Platelets decreased ([facility]) 07/22/2022    Acute kidney injury superimposed on chronic kidney disease  ([facility]) 02/16/2022    Actinic keratoses 01/14/2022    Type 2 diabetes mellitus with diabetic peripheral angiopathy  without gangrene, with long-term current use of insulin (HCC) 01/11/2022    Varicose veins of left lower extremity 06/25/2021    History of endovascular stent graft for abdominal aortic aneurysm (AAA) 06/25/2021    Bruit (arterial) 06/25/2021    Peripheral artery disease (HCC) 06/25/2021    Type 2 diabetes mellitus with diabetic polyneuropathy, with long-term current use of insulin (HCC) 06/10/2021    Mixed hyperlipidemia 05/11/2021    Primary hypertension 05/11/2021    Coronary artery disease involving native coronary artery of native heart without angina pectoris 05/11/2021    S/P angioplasty with stent 05/11/2021    Hx of CABG 05/11/2021    S/P AAA repair 05/11/2021    S/P prostatectomy 05/11/2021    H/O prostate cancer 05/11/2021     He  has a past surgical history that includes Cardiac surgery (2002); Tonsillectomy; ADENOIDECTOMY; Coronary artery bypass graft; Cholecystectomy; Prostate surgery; Cardiac catheterization (Left, 10/19/2022); Colonoscopy (02/14/2024); pr slctv cathj 3rd+ ord slctv abdl pel/lxtr brnch (Right, 11/01/2023); IR lower extremity angiogram (11/01/2023); pr bypass w/vein femoral-popliteal (Right, 11/16/2023); Abdominal aortic aneurysm repair; Urinary sphincter implant; IR lower extremity angiogram (08/07/2024); pr slctv cathj 3rd+ ord slctv abdl pel/lxtr brnch (Left, 08/07/2024); Back surgery (1985); Laminectomy (1990); and pr bypass w/vein femoral-popliteal (Left, 8/30/2024).  His family history includes Alcohol abuse in his father; Cancer in his brother, brother, sister, and sister; Diabetes in his mother and sister; Heart disease in his brother.  He  reports that he quit smoking about 36 years ago. His smoking use included cigarettes. He started smoking about 68 years ago. He has a 49.5 pack-year smoking history. He has been exposed to tobacco smoke. He has never used smokeless tobacco. He reports that he does not currently use alcohol after a past usage of about 5.0 standard drinks of  alcohol per week. He reports that he does not use drugs.  Current Outpatient Medications   Medication Sig Dispense Refill    acetaminophen (TYLENOL) 325 mg tablet Take 2 tablets (650 mg total) by mouth every 6 (six) hours as needed for mild pain      amLODIPine (NORVASC) 10 mg tablet Take 0.5 tablets (5 mg total) by mouth daily 45 tablet 1    aspirin 81 mg chewable tablet Chew 81 mg daily      atorvastatin (LIPITOR) 40 mg tablet Take 1 tablet (40 mg total) by mouth daily 90 tablet 1    Blood Glucose Monitoring Suppl (OneTouch Verio Reflect) w/Device KIT Check blood sugars twice daily. Please substitute with appropriate alternative as covered by patient's insurance. Dx: E11.65 1 kit 0    cloNIDine (CATAPRES) 0.1 mg tablet take 1 tablet by mouth every 12 hours 180 tablet 1    clopidogrel (PLAVIX) 75 mg tablet Take 1 tablet (75 mg total) by mouth daily 30 tablet 3    collagenase (SANTYL) ointment Apply topically daily (Patient not taking: Reported on 9/10/2024) 30 g 0    Droplet Pen Needles 32G X 4 MM MISC USE EVERY EVENING 100 each 5    DULoxetine (CYMBALTA) 30 mg delayed release capsule take 1 capsule by mouth once daily 90 capsule 0    Empagliflozin (Jardiance) 25 MG TABS TAKE 1 TABLET BY MOUTH DAILY 90 tablet 1    fenofibrate 160 MG tablet TAKE 1 TABLET BY MOUTH DAILY 100 tablet 1    glucose blood (OneTouch Verio) test strip TEST TWICE A  strip 5    insulin glargine (LANTUS) 100 units/mL subcutaneous injection Inject 15 Units under the skin daily at bedtime (Patient taking differently: Inject 15 Units under the skin daily at bedtime If BS > 150) 10 mL 0    isosorbide mononitrate (IMDUR) 30 mg 24 hr tablet Take 3 tablets (90 mg total) by mouth daily 270 tablet 1    ketoconazole (NIZORAL) 2 % cream Apply topically daily (Patient not taking: Reported on 9/17/2024) 60 g 1    metFORMIN (GLUCOPHAGE) 500 mg tablet Take 1 tablet (500 mg total) by mouth 2 (two) times a day with meals 180 tablet 1    metoprolol  tartrate (LOPRESSOR) 50 mg tablet Take 0.5 tablets (25 mg total) by mouth every 12 (twelve) hours (Patient taking differently: Take 25 mg by mouth 2 (two) times a day) 90 tablet 1    Multiple Vitamins-Minerals (MULTIVITAMIN MEN 50+ PO) Take by mouth daily      nitroglycerin (NITROSTAT) 0.4 mg SL tablet Place 1 tablet (0.4 mg total) under the tongue every 5 (five) minutes as needed for chest pain 30 tablet 0    Omega-3 Fatty Acids (fish oil) 1,000 mg Take 4,000 mg by mouth 2 (two) times a day      ondansetron (ZOFRAN) 8 mg tablet Take 0.5 tablets (4 mg total) by mouth every 6 (six) hours as needed for nausea or vomiting 20 tablet 0    OneTouch Delica Lancets 33G MISC Check blood sugars twice daily. Please substitute with appropriate alternative as covered by patient's insurance. Dx: E11.65 200 each 3    oxyCODONE-acetaminophen (Percocet) 5-325 mg per tablet Take 1 tablet by mouth 2 (two) times a day as needed for moderate pain or severe pain Max Daily Amount: 2 tablets 60 tablet 0    pantoprazole (PROTONIX) 40 mg tablet Take 1 tablet (40 mg total) by mouth daily 90 tablet 1    pentoxifylline (TRENtal) 400 mg ER tablet TAKE 1 TABLET BY MOUTH 3 TIMES  DAILY WITH MEALS 270 tablet 3    pregabalin (LYRICA) 150 mg capsule Take 1 capsule (150 mg total) by mouth 3 (three) times a day 90 capsule 0    ranolazine (RANEXA) 1000 MG SR tablet Take 1 tablet (1,000 mg total) by mouth 2 (two) times a day 180 tablet 3    senna (SENOKOT) 8.6 mg Take 1 tablet (8.6 mg total) by mouth daily Stool softener for constipation-- hold loose stools (Patient not taking: Reported on 9/11/2024)      sitaGLIPtin (Januvia) 100 mg tablet TAKE 1 TABLET BY MOUTH DAILY 100 tablet 1    torsemide (DEMADEX) 20 mg tablet TAKE 1 TABLET BY MOUTH DAILY 90 tablet 1     No current facility-administered medications for this visit.     Current Outpatient Medications on File Prior to Visit   Medication Sig    acetaminophen (TYLENOL) 325 mg tablet Take 2 tablets (650  mg total) by mouth every 6 (six) hours as needed for mild pain    amLODIPine (NORVASC) 10 mg tablet Take 0.5 tablets (5 mg total) by mouth daily    aspirin 81 mg chewable tablet Chew 81 mg daily    atorvastatin (LIPITOR) 40 mg tablet Take 1 tablet (40 mg total) by mouth daily    Blood Glucose Monitoring Suppl (OneTouch Verio Reflect) w/Device KIT Check blood sugars twice daily. Please substitute with appropriate alternative as covered by patient's insurance. Dx: E11.65    cloNIDine (CATAPRES) 0.1 mg tablet take 1 tablet by mouth every 12 hours    clopidogrel (PLAVIX) 75 mg tablet Take 1 tablet (75 mg total) by mouth daily    collagenase (SANTYL) ointment Apply topically daily (Patient not taking: Reported on 9/10/2024)    Droplet Pen Needles 32G X 4 MM MISC USE EVERY EVENING    DULoxetine (CYMBALTA) 30 mg delayed release capsule take 1 capsule by mouth once daily    Empagliflozin (Jardiance) 25 MG TABS TAKE 1 TABLET BY MOUTH DAILY    fenofibrate 160 MG tablet TAKE 1 TABLET BY MOUTH DAILY    glucose blood (OneTouch Verio) test strip TEST TWICE A DAY    insulin glargine (LANTUS) 100 units/mL subcutaneous injection Inject 15 Units under the skin daily at bedtime (Patient taking differently: Inject 15 Units under the skin daily at bedtime If BS > 150)    isosorbide mononitrate (IMDUR) 30 mg 24 hr tablet Take 3 tablets (90 mg total) by mouth daily    ketoconazole (NIZORAL) 2 % cream Apply topically daily (Patient not taking: Reported on 9/17/2024)    metFORMIN (GLUCOPHAGE) 500 mg tablet Take 1 tablet (500 mg total) by mouth 2 (two) times a day with meals    metoprolol tartrate (LOPRESSOR) 50 mg tablet Take 0.5 tablets (25 mg total) by mouth every 12 (twelve) hours (Patient taking differently: Take 25 mg by mouth 2 (two) times a day)    Multiple Vitamins-Minerals (MULTIVITAMIN MEN 50+ PO) Take by mouth daily    nitroglycerin (NITROSTAT) 0.4 mg SL tablet Place 1 tablet (0.4 mg total) under the tongue every 5 (five) minutes  as needed for chest pain    Omega-3 Fatty Acids (fish oil) 1,000 mg Take 4,000 mg by mouth 2 (two) times a day    ondansetron (ZOFRAN) 8 mg tablet Take 0.5 tablets (4 mg total) by mouth every 6 (six) hours as needed for nausea or vomiting    OneTouch Delica Lancets 33G MISC Check blood sugars twice daily. Please substitute with appropriate alternative as covered by patient's insurance. Dx: E11.65    oxyCODONE-acetaminophen (Percocet) 5-325 mg per tablet Take 1 tablet by mouth 2 (two) times a day as needed for moderate pain or severe pain Max Daily Amount: 2 tablets    pantoprazole (PROTONIX) 40 mg tablet Take 1 tablet (40 mg total) by mouth daily    pentoxifylline (TRENtal) 400 mg ER tablet TAKE 1 TABLET BY MOUTH 3 TIMES  DAILY WITH MEALS    pregabalin (LYRICA) 150 mg capsule Take 1 capsule (150 mg total) by mouth 3 (three) times a day    ranolazine (RANEXA) 1000 MG SR tablet Take 1 tablet (1,000 mg total) by mouth 2 (two) times a day    senna (SENOKOT) 8.6 mg Take 1 tablet (8.6 mg total) by mouth daily Stool softener for constipation-- hold loose stools (Patient not taking: Reported on 9/11/2024)    sitaGLIPtin (Januvia) 100 mg tablet TAKE 1 TABLET BY MOUTH DAILY    torsemide (DEMADEX) 20 mg tablet TAKE 1 TABLET BY MOUTH DAILY     No current facility-administered medications on file prior to visit.     He is allergic to lisinopril..    Vitals:    09/17/24 0858   BP: 115/67   Pulse: 68   Resp: 17       Review of Systems      Objective:  Patient's shoes and socks removed.   Foot Exam    General  General Appearance: appears stated age and healthy   Orientation: alert and oriented to person, place, and time   Affect: appropriate   Gait: antalgic       Right Foot/Ankle     Inspection and Palpation  Swelling: dorsum   Arch: pes cavus  Hallux limitus: yes  Skin Exam: dry skin;     Neurovascular  Dorsalis pedis: 1+  Posterior tibial: 1+  Saphenous nerve sensation: diminished  Tibial nerve sensation: diminished  Superficial  peroneal nerve sensation: diminished  Deep peroneal nerve sensation: diminished  Sural nerve sensation: diminished      Left Foot/Ankle      Inspection and Palpation  Tenderness: bony tenderness and calcaneus tenderness   Swelling: dorsum   Arch: pes cavus  Hallux limitus: yes  Skin Exam: dry skin;     Neurovascular  Dorsalis pedis: 1+  Posterior tibial: 1+  Saphenous nerve sensation: diminished  Tibial nerve sensation: diminished  Superficial peroneal nerve sensation: diminished  Deep peroneal nerve sensation: diminished  Sural nerve sensation: diminished      Physical Exam  Vitals and nursing note reviewed.   Constitutional:       Appearance: Normal appearance.   Cardiovascular:      Rate and Rhythm: Normal rate and regular rhythm.      Pulses: Pulses are weak.           Dorsalis pedis pulses are 1+ on the right side and 1+ on the left side.        Posterior tibial pulses are 1+ on the right side and 1+ on the left side.   Musculoskeletal:      Left foot: Bony tenderness present.   Feet:      Right foot:      Skin integrity: Dry skin present.      Left foot:      Skin integrity: Dry skin present.      Comments: Patient is 1 left foot diabetic ulcers.  Large plantar medial aspect ulcer of the heel is a Sumner grade 2.  Negative cellulitis.  Lateral aspect fifth met base demonstrates 1.0 cm grade Sumner grade 1 ulcer.  Distal aspect second left toe demonstrates 0.5 cm² Sumner grade 1 ulcer.  Ulcers demonstrate no evidence of abscess surrounding mild undermining.  Negative cellulitis.  Skin:     Capillary Refill: Capillary refill takes 2 to 3 seconds.   Psychiatric:         Mood and Affect: Mood normal.         Behavior: Behavior normal.         Thought Content: Thought content normal.         Judgment: Judgment normal.     Patient's shoes and socks removed.    Right Foot/Ankle   Right Foot Inspection  Skin Exam: dry skin.     Toe Exam: tenderness and right toe deformity.     Sensory   Vibration:  absent  Proprioception: diminished  Monofilament testing: diminished    Vascular  Capillary refills: < 3 seconds  The right DP pulse is 1+. The right PT pulse is 1+.     Right Toe  - Comprehensive Exam  Arch: pes cavus  Hallux limitus: yes  Swelling: dorsum       Left Foot/Ankle  Left Foot Inspection  Skin Exam: dry skin.     Toe Exam: tenderness and left toe deformity.     Sensory   Vibration: absent  Proprioception: diminished  Monofilament testing: diminished    Vascular  Capillary refills: < 3 seconds  The left DP pulse is 1+. The left PT pulse is 1+.     Left Toe  - Comprehensive Exam  Arch: pes cavus  Hallux limitus: yes  Swelling: dorsum   Tenderness: bony tenderness and calcaneus tenderness       Assign Risk Category  Deformity present  Loss of protective sensation  Weak pulses  Risk: 2

## 2024-09-17 NOTE — PROGRESS NOTES
Thomas Horne is a(n) 79 y.o. male. , :  1945    Subjective     Assessment:  The encounter diagnosis was Stress incontinence of urine.  79 M incontinent with AUS from . Had reactivated 24 by Dr. Sinclair.  Patient states that it is working fairly well.  Occasional leakage but not severe.  Feels like he empties appropriately.  Nothing acutely to do today.  Follow-up as needed.     Radiology  none     History  prostate cancer status post RALP in   artificial urinary sphincter placement in  for stress incontinence  Stable nonobstructive 5 mm right lower pole calculus  Left renal cyst   Bilateral lower pole cortical thinning consistent with prior infarction     Prior Visits  [place] 2023  Thomas Horne is a 78 y.o. male with a history of prostate cancer status post RALP in .  He later underwent artificial urinary sphincter placement in  for stress incontinence.  He cycles the device without any issues.  He underwent a vascular procedure on 2023.  After the procedure the patient was bladder scanned for 300 cc and was unable to void.  My partner, Dr. Martin, deactivated the sphincter and a 14 French urethral catheter was placed atraumatically.  It was removed 48 hours later.  The patient returns for evaluation and consideration of reactivation.  The patient voided about an hour prior to this appointment and his PVR is 18mL.  He reports that postoperatively due to the anesthesia he was out of it and therefore unable to cycle the device.  The patient would like to establish care with [facility] urology.  Last PSA was checked 2023 and was undetectable.     2024 SOO Palomares  79 M incontinent with AUS from . Had reactivated 24 by Dr. Sinclair.  Patient states that it is working fairly well.  Occasional leakage but not severe.  Feels like he empties appropriately.  Nothing acutely to do today.  Follow-up as needed.    Review of Systems    Lab Results  "  Component Value Date    PSA <0.01 10/04/2023    PSA <0.1 10/10/2022    PSA <0.1 06/02/2021     No results found for: \"TESTOSTERONE\"  No components found for: \"CR\"  No results found for: \"HBA1C\"    Objective     /70 (BP Location: Left arm, Patient Position: Sitting, Cuff Size: Adult)   Pulse 67   Ht 6' 2\" (1.88 m)   Wt 102 kg (224 lb)   SpO2 98%   BMI 28.76 kg/m²     Physical Exam      Meño Jelani, [facility]  "

## 2024-09-19 ENCOUNTER — OFFICE VISIT (OUTPATIENT)
Dept: VASCULAR SURGERY | Facility: CLINIC | Age: 79
End: 2024-09-19

## 2024-09-19 VITALS
BODY MASS INDEX: 28.62 KG/M2 | WEIGHT: 223 LBS | SYSTOLIC BLOOD PRESSURE: 112 MMHG | HEIGHT: 74 IN | DIASTOLIC BLOOD PRESSURE: 60 MMHG | HEART RATE: 68 BPM

## 2024-09-19 DIAGNOSIS — I73.9 PERIPHERAL ARTERY DISEASE (HCC): Primary | ICD-10-CM

## 2024-09-19 PROCEDURE — 99024 POSTOP FOLLOW-UP VISIT: CPT | Performed by: NURSE PRACTITIONER

## 2024-09-19 NOTE — PROGRESS NOTES
Ambulatory Visit  Name: Thomas Horne      : 1945      MRN: [id number]  Encounter Provider: APOLLO Alexander  Encounter Date: 2024   Encounter department: [facility] [place]    Assessment & Plan  Peripheral artery disease (HCC)  79-year-old male, former smoker, with HTN, HLD, CAD s/p CABG, AAA s/p EVAR, type II DM, CHF, pulmonary HTN, CKD 3, prostate CA s/p prostatectomy with urinary sphincter placement, provoked LLE DVT 2024 treated with Coumadin, PAD s/p R CFA to peroneal artery bypass w/ reversed cryovein on 23 and now s/p L distal SFA to BK popliteal artery bypass tunnelled anatomically with PTFE with Dr. Clark 2024 secondary to tissue loss     Patient presents the office for postoperative visit     -Left thigh incision is healed completely.  Proximal left medial calf incision with partial staples.  Staples removed.  Incision is well-healed without any dehiscence or drainage.  No erythema.  -Dopplerable bypass graft pulse and dopplerable PT pulse  -Continue with local wound care.  Reviewed images from wound care.  Left heel wound with good bleeding after debridement   -Left lower extremity swelling much improved  -Continue aspirin, Plavix and atorvastatin  -Follow-up in the office in 2 weeks with Dr. Clark as scheduled   -Lower extremity arterial duplex in 3 months for bypass graft surveillance         History of Present Illness     Thomas Horne is a 79 y.o. male who returns to the office for postoperative visit.  Status post left distal SFA to BK pop bypass with PTFE graft by Dr. More.  2024.  Bypass stent secondary to left heel and left lateral foot ulcerations.  He is following at the wound care center weekly.  He returns today for staple removal of left calf incision.  He denies any fevers or chills.  He notes improvement in left lower extremity swelling.    Objective   I have reviewed and made appropriate changes to the review of systems input by  "the medical assistant.    Vitals:    09/19/24 1001   BP: 112/60   BP Location: Left arm   Patient Position: Sitting   Cuff Size: Standard   Pulse: 68   Weight: 101 kg (223 lb)   Height: 6' 2\" (1.88 m)       Patient Active Problem List   Diagnosis    Mixed hyperlipidemia    Primary hypertension    Coronary artery disease involving native coronary artery of native heart without angina pectoris    S/P angioplasty with stent    Hx of CABG    S/P AAA repair    S/P prostatectomy    H/O prostate cancer    Type 2 diabetes mellitus with diabetic polyneuropathy, with long-term current use of insulin (HCC)    Varicose veins of left lower extremity    History of endovascular stent graft for abdominal aortic aneurysm (AAA)    Bruit (arterial)    Peripheral artery disease (HCC)    Type 2 diabetes mellitus with diabetic peripheral angiopathy without gangrene, with long-term current use of insulin (HCC)    Actinic keratoses    Acute kidney injury superimposed on chronic kidney disease  (HCC)    Platelets decreased (HCC)    Gross hematuria    Chronic HFpEF/Moderate pHTN (HFpEF) (HCC)    Mild aortic stenosis    Chronic kidney disease-mineral and bone disorder    Stable angina pectoris    Hypertensive urgency    Elevated troponin level not due myocardial infarction    Diabetic ulcer of right midfoot associated with diabetes mellitus due to underlying condition, limited to breakdown of skin (HCC)    Acute on chronic diastolic heart failure (HCC)    Stage 3b chronic kidney disease (HCC)    Frequent PVCs    Melena    Symptomatic anemia    Multiple gastric ulcers    Iron deficiency anemia due to chronic blood loss    Duodenal ulcer    History of colon polyps    Shortness of breath    Iron deficiency anemia    Acute deep vein thrombosis (DVT) of left peroneal vein (HCC)    Plantar fasciitis, right    Diabetic ulcer of toe of left foot associated with type 2 diabetes mellitus, limited to breakdown of skin (HCC)    History of DVT (deep vein " thrombosis)    SOB (shortness of breath)    Nausea    Elevated troponin    Foot ulcer (HCC)       Past Surgical History:   Procedure Laterality Date    ABDOMINAL AORTIC ANEURYSM REPAIR      Stented    ADENOIDECTOMY      BACK SURGERY  1985    CARDIAC CATHETERIZATION Left 10/19/2022    Procedure: Cardiac Left Heart Cath;  Surgeon: Danielle Pereira MD;  Location: [facility];  Service: Cardiology    CARDIAC SURGERY  2002    3 cardiac bypass then angioplasty 7/2020    CHOLECYSTECTOMY      COLONOSCOPY  02/14/2024    CORONARY ARTERY BYPASS GRAFT      IR LOWER EXTREMITY ANGIOGRAM  11/01/2023    IR LOWER EXTREMITY ANGIOGRAM  08/07/2024    LAMINECTOMY  1990    NJ BYPASS W/VEIN FEMORAL-POPLITEAL Right 11/16/2023    Procedure: BYPASS FEMORAL-POPLITEAL WITH CRYO VEIN, RIGHT FEMORAL ENDARTERECTOMY;  Surgeon: Vasquez Clark MD;  Location: [facility];  Service: Vascular    NJ BYPASS W/VEIN FEMORAL-POPLITEAL Left 8/30/2024    Procedure: left lower extremity above knee popliteal to below knee popliteal artery bypass with PTFE graft;  Surgeon: Vasquez Clark MD;  Location: [facility];  Service: Vascular    NJ SLCTV CATHJ 3RD+ ORD SLCTV ABDL PEL/LXTR BRNC Right 11/01/2023    Procedure: ARTERIOGRAM Right lower extremity arteriogram with CO2 via right groin access;  Surgeon: Vasquez Clark MD;  Location: [facility];  Service: Vascular    NJ SLCTV CATHJ 3RD+ ORD SLCTV ABDL PEL/LXTR BRNC Left 08/07/2024    Procedure: diagnostic LLE Arteriogram;  Surgeon: Vasquez Clark MD;  [facility];  Service: Vascular    PROSTATE SURGERY      TONSILLECTOMY      URINARY SPHINCTER IMPLANT         Family History   Problem Relation Age of Onset    Diabetes Mother     Alcohol abuse Father     Heart disease Brother     Cancer Sister         Thyroid    Cancer Sister         Colon    Cancer Brother         Throat    Cancer Brother     Diabetes Sister     Mental illness Neg Hx        Social  History     Socioeconomic History    Marital status:      Spouse name: Not on file    Number of children: 2    Years of education: Not on file    Highest education level: Some college, no degree   Occupational History    Not on file   Tobacco Use    Smoking status: Former     Current packs/day: 0.00     Average packs/day: 1.5 packs/day for 33.0 years (49.5 ttl pk-yrs)     Types: Cigarettes     Start date: 1956     Quit date:      Years since quittin.7     Passive exposure: Past    Smokeless tobacco: Never   Vaping Use    Vaping status: Never Used   Substance and Sexual Activity    Alcohol use: Not Currently     Alcohol/week: 5.0 standard drinks of alcohol     Types: 5 Cans of beer per week     Comment: stopped last few months    Drug use: Never    Sexual activity: Not Currently     Partners: Female     Birth control/protection: Male Sterilization   Other Topics Concern    Not on file   Social History Narrative    Not on file     Social Determinants of Health     Financial Resource Strain: Low Risk  (10/31/2023)    Overall Financial Resource Strain (CARDIA)     Difficulty of Paying Living Expenses: Not hard at all   Food Insecurity: No Food Insecurity (2024)    Hunger Vital Sign     Worried About Running Out of Food in the Last Year: Never true     Ran Out of Food in the Last Year: Never true   Transportation Needs: No Transportation Needs (2024)    PRAPARE - Transportation     Lack of Transportation (Medical): No     Lack of Transportation (Non-Medical): No   Physical Activity: Not on file   Stress: Not on file   Social Connections: Not on file   Intimate Partner Violence: Not on file   Housing Stability: Low Risk  (2024)    Housing Stability Vital Sign     Unable to Pay for Housing in the Last Year: No     Number of Times Moved in the Last Year: 1     Homeless in the Last Year: No       Allergies   Allergen Reactions    Lisinopril Rash and Lip Swelling         Current Outpatient  Medications:     acetaminophen (TYLENOL) 325 mg tablet, Take 2 tablets (650 mg total) by mouth every 6 (six) hours as needed for mild pain, Disp: , Rfl:     amLODIPine (NORVASC) 10 mg tablet, Take 0.5 tablets (5 mg total) by mouth daily, Disp: 45 tablet, Rfl: 1    aspirin 81 mg chewable tablet, Chew 81 mg daily, Disp: , Rfl:     atorvastatin (LIPITOR) 40 mg tablet, Take 1 tablet (40 mg total) by mouth daily, Disp: 90 tablet, Rfl: 1    Blood Glucose Monitoring Suppl (OneTouch Verio Reflect) w/Device KIT, Check blood sugars twice daily. Please substitute with appropriate alternative as covered by patient's insurance. Dx: E11.65, Disp: 1 kit, Rfl: 0    cloNIDine (CATAPRES) 0.1 mg tablet, take 1 tablet by mouth every 12 hours, Disp: 180 tablet, Rfl: 1    clopidogrel (PLAVIX) 75 mg tablet, Take 1 tablet (75 mg total) by mouth daily, Disp: 30 tablet, Rfl: 3    Droplet Pen Needles 32G X 4 MM MISC, USE EVERY EVENING, Disp: 100 each, Rfl: 5    DULoxetine (CYMBALTA) 30 mg delayed release capsule, take 1 capsule by mouth once daily, Disp: 90 capsule, Rfl: 0    Empagliflozin (Jardiance) 25 MG TABS, TAKE 1 TABLET BY MOUTH DAILY, Disp: 90 tablet, Rfl: 1    fenofibrate 160 MG tablet, TAKE 1 TABLET BY MOUTH DAILY, Disp: 100 tablet, Rfl: 1    glucose blood (OneTouch Verio) test strip, TEST TWICE A DAY, Disp: 200 strip, Rfl: 5    insulin glargine (LANTUS) 100 units/mL subcutaneous injection, Inject 15 Units under the skin daily at bedtime (Patient taking differently: Inject 15 Units under the skin daily at bedtime If BS > 150), Disp: 10 mL, Rfl: 0    isosorbide mononitrate (IMDUR) 30 mg 24 hr tablet, Take 3 tablets (90 mg total) by mouth daily, Disp: 270 tablet, Rfl: 1    metFORMIN (GLUCOPHAGE) 500 mg tablet, Take 1 tablet (500 mg total) by mouth 2 (two) times a day with meals, Disp: 180 tablet, Rfl: 1    metoprolol tartrate (LOPRESSOR) 50 mg tablet, Take 0.5 tablets (25 mg total) by mouth every 12 (twelve) hours (Patient taking  differently: Take 25 mg by mouth 2 (two) times a day), Disp: 90 tablet, Rfl: 1    Multiple Vitamins-Minerals (MULTIVITAMIN MEN 50+ PO), Take by mouth daily, Disp: , Rfl:     nitroglycerin (NITROSTAT) 0.4 mg SL tablet, Place 1 tablet (0.4 mg total) under the tongue every 5 (five) minutes as needed for chest pain, Disp: 30 tablet, Rfl: 0    Omega-3 Fatty Acids (fish oil) 1,000 mg, Take 4,000 mg by mouth 2 (two) times a day, Disp: , Rfl:     ondansetron (ZOFRAN) 8 mg tablet, Take 0.5 tablets (4 mg total) by mouth every 6 (six) hours as needed for nausea or vomiting, Disp: 20 tablet, Rfl: 0    OneTouch Delica Lancets 33G MISC, Check blood sugars twice daily. Please substitute with appropriate alternative as covered by patient's insurance. Dx: E11.65, Disp: 200 each, Rfl: 3    oxyCODONE-acetaminophen (Percocet) 5-325 mg per tablet, Take 1 tablet by mouth 2 (two) times a day as needed for moderate pain or severe pain Max Daily Amount: 2 tablets, Disp: 60 tablet, Rfl: 0    pantoprazole (PROTONIX) 40 mg tablet, Take 1 tablet (40 mg total) by mouth daily, Disp: 90 tablet, Rfl: 1    pentoxifylline (TRENtal) 400 mg ER tablet, TAKE 1 TABLET BY MOUTH 3 TIMES  DAILY WITH MEALS, Disp: 270 tablet, Rfl: 3    pregabalin (LYRICA) 150 mg capsule, Take 1 capsule (150 mg total) by mouth 3 (three) times a day, Disp: 90 capsule, Rfl: 0    ranolazine (RANEXA) 1000 MG SR tablet, Take 1 tablet (1,000 mg total) by mouth 2 (two) times a day, Disp: 180 tablet, Rfl: 3    sitaGLIPtin (Januvia) 100 mg tablet, TAKE 1 TABLET BY MOUTH DAILY, Disp: 100 tablet, Rfl: 1    torsemide (DEMADEX) 20 mg tablet, TAKE 1 TABLET BY MOUTH DAILY, Disp: 90 tablet, Rfl: 1    collagenase (SANTYL) ointment, Apply topically daily (Patient not taking: Reported on 9/10/2024), Disp: 30 g, Rfl: 0    ketoconazole (NIZORAL) 2 % cream, Apply topically daily (Patient not taking: Reported on 9/17/2024), Disp: 60 g, Rfl: 1    senna (SENOKOT) 8.6 mg, Take 1 tablet (8.6 mg total) by  "mouth daily Stool softener for constipation-- hold loose stools (Patient not taking: Reported on 9/11/2024), Disp: , Rfl:    /60 (BP Location: Left arm, Patient Position: Sitting, Cuff Size: Standard)   Pulse 68   Ht 6' 2\" (1.88 m)   Wt 101 kg (223 lb)   BMI 28.63 kg/m²     Physical Exam  Vitals and nursing note reviewed.   HENT:      Head: Normocephalic and atraumatic.   Eyes:      Extraocular Movements: Extraocular movements intact.   Cardiovascular:      Comments: Dopplerable bypass graft pulse and distal PT pulse  Pulmonary:      Effort: Pulmonary effort is normal.   Musculoskeletal:         General: No swelling.   Skin:     General: Skin is warm.      Comments: Incision healed completely.  Left calf incision partial staples removed.  No wound dehiscence or drainage  Left heel wound dressing in place + odor    Neurological:      General: No focal deficit present.      Mental Status: He is oriented to person, place, and time.   Psychiatric:         Mood and Affect: Mood normal.         Behavior: Behavior normal.         "

## 2024-09-19 NOTE — ASSESSMENT & PLAN NOTE
79-year-old male, former smoker, with HTN, HLD, CAD s/p CABG, AAA s/p EVAR, type II DM, CHF, pulmonary HTN, CKD 3, prostate CA s/p prostatectomy with urinary sphincter placement, provoked LLE DVT 04/2024 treated with Coumadin, PAD s/p R CFA to peroneal artery bypass w/ reversed cryovein on 11/16/23 and now s/p L distal SFA to BK popliteal artery bypass tunnelled anatomically with PTFE with Dr. Clark 8/30/2024 secondary to tissue loss     Patient presents the office for postoperative visit     -Left thigh incision is healed completely.  Proximal left medial calf incision with partial staples.  Staples removed.  Incision is well-healed without any dehiscence or drainage.  No erythema.  -Dopplerable bypass graft pulse and dopplerable PT pulse  -Continue with local wound care.  Reviewed images from wound care.  Left heel wound with good bleeding after debridement   -Left lower extremity swelling much improved  -Continue aspirin, Plavix and atorvastatin  -Follow-up in the office in 2 weeks with Dr. Clark as scheduled   -Lower extremity arterial duplex in 3 months for bypass graft surveillance

## 2024-09-23 ENCOUNTER — OFFICE VISIT (OUTPATIENT)
Dept: WOUND CARE | Facility: HOSPITAL | Age: 79
End: 2024-09-23
Payer: COMMERCIAL

## 2024-09-23 VITALS
HEART RATE: 66 BPM | RESPIRATION RATE: 18 BRPM | TEMPERATURE: 97 F | DIASTOLIC BLOOD PRESSURE: 73 MMHG | SYSTOLIC BLOOD PRESSURE: 148 MMHG

## 2024-09-23 DIAGNOSIS — I73.9 PERIPHERAL ARTERY DISEASE (HCC): ICD-10-CM

## 2024-09-23 DIAGNOSIS — L97.522 DIABETIC ULCER OF LEFT FOOT ASSOCIATED WITH TYPE 2 DIABETES MELLITUS, WITH FAT LAYER EXPOSED, UNSPECIFIED PART OF FOOT (HCC): Primary | ICD-10-CM

## 2024-09-23 DIAGNOSIS — E11.621 DIABETIC ULCER OF LEFT FOOT ASSOCIATED WITH TYPE 2 DIABETES MELLITUS, WITH FAT LAYER EXPOSED, UNSPECIFIED PART OF FOOT (HCC): Primary | ICD-10-CM

## 2024-09-23 PROCEDURE — 99213 OFFICE O/P EST LOW 20 MIN: CPT | Performed by: FAMILY MEDICINE

## 2024-09-23 RX ORDER — LIDOCAINE 40 MG/G
CREAM TOPICAL ONCE
Status: COMPLETED | OUTPATIENT
Start: 2024-09-23 | End: 2024-09-23

## 2024-09-23 RX ADMIN — LIDOCAINE 1 APPLICATION: 40 CREAM TOPICAL at 09:08

## 2024-09-23 NOTE — PATIENT INSTRUCTIONS
Orders Placed This Encounter   Procedures    Wound cleansing and dressings     Left foot wounds:     Hold santyl, do not discard. Store per manufacturers recommendations     Wash your hands with soap and water. Remove old dressing, discard into plastic bag and place in trash. Cleanse the wound with dakins moistened gauze prior to applying a clean dressing. Do not use tissue or cotton balls. Do not scrub the wound. Pat dry using gauze.     Shower no, unless able to keep wounds dry.     Apply lotion to skin surrounding wound   Apply alginate ag to the open wound.   Cover with abd (gauze to toe wound)   Secure with rolled gauze and tape     Change dressing 3 times weekly     Left toe wound:     Wash your hands with soap and water.  Remove old dressing, discard into plastic bag and place in trash.  Cleanse the wound with dakins moistened gauze prior to applying a clean dressing. Do not use tissue or cotton balls. Do not scrub the wound. Pat dry using gauze.  Shower no, unless able to keep wounds dry      Apply polymem max ag to the open wound.    Cover with gauze  Secure with tape  Change dressing 3 times weekly           Off-loading Instructions: Wear off-loading device as directed by your physician (darco wedge). Put on immediately when rising in the morning and remove when going to bed and Turn every 2 hours. Avoid position directing pressure to Wound site. Limit side lying to 30 degree tilt. Limit the head of bed elevation to 30 degrees. Limit walking and standing to only necessity for activities of daily living.     Wound infection: If you have signs of infection please call the wound center. If the wound center is closedplease go to the Emergency department. Some signs of infection: fever, chills, increased redness, red streaks, increase in pain, increased drainage. Drainage with an odor, Change in drainage color: white/milky/green/tan/yellow, an increase in swelling, chest pain and/or shortness of  breath.    Protein: Eat protein with each meal to promote healing. Examples of protein are fish, meat, chicken, nuts, peanut butter, eggs, lentils, edamame or a protein shake.     Continue with [facility] for wound care, Follow instructions for care, do not use bordered foam     Standing Status:   Future     Standing Expiration Date:   9/30/2024

## 2024-09-23 NOTE — PROGRESS NOTES
Wound Procedure Treatment    Performed by: Nadege Pinzon RN  Authorized by: Nitza Sotelo DO    Associated wounds:   Wound 08/15/24 Diabetic Ulcer Heel Left  Wound 08/15/24 Diabetic Ulcer Plantar Left;Lateral  Wound cleansed with:  NSS and Dakin's 0.125%  Applied primary dressing:  Calcium alginate and Silver  Applied secondary dressing:  ABD  Dressing secured with:  Kerlix and Tape  Wound Procedure Treatment Diabetic Ulcer Anterior;Left Toe D2, second    Performed by: Nadege Pinzon RN  Authorized by: Nitza Sotelo DO    Associated wounds:   Wound 08/15/24 Diabetic Ulcer Toe D2, second Anterior;Left  Wound cleansed with:  Dakin's 0.125%  Applied primary dressing:  Silver and Polymem foam  Applied secondary dressing:  Gauze  Dressing secured with:  Tape

## 2024-09-23 NOTE — PROGRESS NOTES
Patient ID: Thomas Horne is a 79 y.o. male Date of Birth 1945     Chief Complaint  Chief Complaint   Patient presents with    Follow Up Wound Care Visit     Left foot wounds       Allergies  Lisinopril    Assessment:    No problem-specific Assessment & Plan notes found for this encounter.       Diagnoses and all orders for this visit:    Diabetic ulcer of left foot associated with type 2 diabetes mellitus, with fat layer exposed, unspecified part of foot (HCC)  -     lidocaine (LMX) 4 % cream  -     Wound cleansing and dressings; Future  -     Wound Procedure Treatment  -     Wound Procedure Treatment Diabetic Ulcer Anterior;Left Toe D2, second    Peripheral artery disease (HCC)              Procedures    Plan:  Left heel and lateral foot wounds are slightly worse today but toe wound has improved  Change wound management to silver alginate on the heel and lateral foot and continue PolyMem Ag on the toe.  See wound orders below  Extensively discussed the importance of proper offloading and tight diabetic control   A1C results reviewed with the patient today.  Follow-up in 1 week or call sooner with questions or concerns    Wound 08/15/24 Diabetic Ulcer Heel Left (Active)   Enter Sumner score: Sumner Grade 3: Deep abscess, OM, or joint sepsis 09/23/24 0904   Wound Image Images linked 09/23/24 0904   Wound Description Yellow;White;Slough;Pink;Pale 09/23/24 0904   Clara-wound Assessment Fragile;Maceration 09/23/24 0904   Wound Length (cm) 2.2 cm 09/23/24 0904   Wound Width (cm) 3.9 cm 09/23/24 0904   Wound Depth (cm) 0.6 cm 09/23/24 0904   Wound Surface Area (cm^2) 8.58 cm^2 09/23/24 0904   Wound Volume (cm^3) 5.148 cm^3 09/23/24 0904   Calculated Wound Volume (cm^3) 5.15 cm^3 09/23/24 0904   Change in Wound Size % -2761.11 09/23/24 0904   Drainage Amount Moderate 09/23/24 0904   Drainage Description Yellow;Serosanguineous;Foul smelling 09/23/24 0904   Non-staged Wound Description Full thickness 09/23/24 0904    Dressing Status Intact 09/23/24 0904       Wound 08/15/24 Diabetic Ulcer Plantar Left;Lateral (Active)   Enter Sumner score: Sumner Grade 2: Deep ulcer extended to ligament, tendon, joint capsule, bone, or deep fascia without abscess or osteomyelitis (OM) 09/23/24 0904   Wound Image Images linked 09/23/24 0904   Wound Description Pink;Yellow;Slough 09/23/24 0904   Clara-wound Assessment Dry;Scaly 09/23/24 0904   Wound Length (cm) 1.2 cm 09/23/24 0904   Wound Width (cm) 0.9 cm 09/23/24 0904   Wound Depth (cm) 0.2 cm 09/23/24 0904   Wound Surface Area (cm^2) 1.08 cm^2 09/23/24 0904   Wound Volume (cm^3) 0.216 cm^3 09/23/24 0904   Calculated Wound Volume (cm^3) 0.22 cm^3 09/23/24 0904   Change in Wound Size % -175 09/23/24 0904   Drainage Amount Moderate 09/23/24 0904   Drainage Description Serosanguineous;Yellow 09/23/24 0904   Non-staged Wound Description Full thickness 09/23/24 0904   Dressing Status Intact 09/23/24 0904       Wound 08/15/24 Diabetic Ulcer Toe D2, second Anterior;Left (Active)   Enter Sumner score: Sumner Grade 3: Deep abscess, OM, or joint sepsis 09/23/24 0904   Wound Image Images linked 09/23/24 0904   Wound Description Pink;Yellow 09/23/24 0904   Clara-wound Assessment Callus 09/23/24 0904   Wound Length (cm) 0.3 cm 09/23/24 0904   Wound Width (cm) 0.2 cm 09/23/24 0904   Wound Depth (cm) 0.1 cm 09/23/24 0904   Wound Surface Area (cm^2) 0.06 cm^2 09/23/24 0904   Wound Volume (cm^3) 0.006 cm^3 09/23/24 0904   Calculated Wound Volume (cm^3) 0.01 cm^3 09/23/24 0904   Change in Wound Size % 97.62 09/23/24 0904   Drainage Amount Moderate 09/23/24 0904   Drainage Description Serosanguineous;Yellow 09/23/24 0904   Non-staged Wound Description Full thickness 09/23/24 0904   Dressing Status Intact 09/23/24 0904       Wound 08/15/24 Diabetic Ulcer Heel Left (Active)   Date First Assessed/Time First Assessed: 08/15/24 0943   Primary Wound Type: Diabetic Ulcer  Location: Heel  Wound Location Orientation: Left        Wound 08/15/24 Diabetic Ulcer Plantar Left;Lateral (Active)   Date First Assessed/Time First Assessed: 08/15/24 0943   Primary Wound Type: Diabetic Ulcer  Location: Plantar  Wound Location Orientation: Left;Lateral       Wound 08/15/24 Diabetic Ulcer Toe D2, second Anterior;Left (Active)   Date First Assessed/Time First Assessed: 08/15/24 0943   Primary Wound Type: Diabetic Ulcer  Location: Toe D2, second  Wound Location Orientation: Anterior;Left       Wound 08/30/24 Leg Left (Active)   Date First Assessed/Time First Assessed: 08/30/24 1124   Location: Leg  Wound Location Orientation: Left  Wound Description (Comments): UPPER LEG AND LOWER LEG- MEPILEX  Incision's 1st Dressing: DRESSING MEPILEX AG BORDER POST-OP 4 X 8 IN (x2)       Wound 09/06/24 Pretibial Left;Proximal (Active)   Date First Assessed/Time First Assessed: 09/06/24 1751   Location: Pretibial  Wound Location Orientation: Left;Proximal       [REMOVED] Wound 12/21/23 Diabetic Ulcer Ankle Posterior;Right (Removed)   Resolved Date: 08/15/24  Date First Assessed/Time First Assessed: 12/21/23 0954   Primary Wound Type: Diabetic Ulcer  Location: Ankle  Wound Location Orientation: Posterior;Right  Wound Description (Comments): MACDONALD 2  Wound Outcome: Unknown (No long...       [REMOVED] Wound 08/07/24 Groin Left (Removed)   Resolved Date: 08/22/24  Date First Assessed/Time First Assessed: 08/07/24 0927   Location: Groin  Wound Location Orientation: Left  Incision's 1st Dressing: ADHESIVE SKIN HIGH VISCOSITY EXOFIN PRECISION PEN (x0)  Wound Outcome: Unknown (No longer pre...       [REMOVED] Wound 09/06/24 Thigh Anterior;Left (Removed)   Resolved Date: 09/16/24  Date First Assessed/Time First Assessed: 09/06/24 1752   Location: Thigh  Wound Location Orientation: Anterior;Left  Wound Outcome: (c) Other (Comment)       [REMOVED] Wound 09/06/24 Ankle Anterior;Left (Removed)   Resolved Date: 09/09/24  Date First Assessed/Time First Assessed: 09/06/24 1804    Location: Ankle  Wound Location Orientation: Anterior;Left  Wound Outcome: Unknown (No longer present)       [REMOVED] Wound 09/06/24 Toe D2, second Anterior;Left (Removed)   Resolved Date: 09/09/24  Date First Assessed/Time First Assessed: 09/06/24 1804   Location: Toe D2, second  Wound Location Orientation: Anterior;Left  Wound Outcome: Unknown (No longer present)       [REMOVED] Wound 09/06/24 Pedal Anterior;Left (Removed)   Resolved Date: 09/09/24  Date First Assessed/Time First Assessed: 09/06/24 1805   Location: Pedal  Wound Location Orientation: Anterior;Left  Wound Outcome: Unknown (No longer present)       Subjective:      .    Patient presents for follow-up of diabetic ulcers of the left heel left lateral foot and left second toe.  No increased pain or drainage.  Has had PolyMem Ag on the wounds.  Patient reports that he is minimally walking on the foot.  He is now s/p L distal SFA to BK popliteal artery bypass tunnelled anatomically with PTFE with Dr. Clark 8/30/2024.  Was seen by vascular for follow-up last week and has another follow-up appointment in 2 weeks.  Noted to have dopplerable pulses.  Arterial studies scheduled for December.        The following portions of the patient's history were reviewed and updated as appropriate: He  has a past medical history of Anemia, CAD (coronary artery disease), Callus, Cancer (HCC), CHF (congestive heart failure) (HCC), Chronic kidney disease, Clotting disorder (HCC), Coronary artery disease (1988), Deep vein thrombosis (HCC), Diabetes mellitus (HCC), Difficulty walking, Duodenal ulcer, Heart disease (1988), Hyperlipidemia, Hypertension, Myocardial infarction (HCC), Neuropathy, 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), Plantar fasciitis, and Sleep apnea.  He   Patient Active Problem List    Diagnosis Date Noted    Foot ulcer (HCC) 09/07/2024    SOB (shortness of breath) 09/06/2024    Nausea 09/06/2024    Elevated troponin 09/06/2024    History of DVT (deep vein  thrombosis) 08/26/2024    Diabetic ulcer of toe of left foot associated with type 2 diabetes mellitus, limited to breakdown of skin (HCC) 08/08/2024    Plantar fasciitis, right 07/10/2024    Acute deep vein thrombosis (DVT) of left peroneal vein (HCC) 05/01/2024    Iron deficiency anemia 04/23/2024    Shortness of breath 04/09/2024    History of colon polyps 02/22/2024    Duodenal ulcer 02/01/2024    Multiple gastric ulcers 12/27/2023    Iron deficiency anemia due to chronic blood loss 12/27/2023    Melena 12/26/2023    Symptomatic anemia 12/26/2023    Frequent PVCs 06/17/2023    Acute on chronic diastolic heart failure (HCC) 06/16/2023    Stage 3b chronic kidney disease ([facility]) 06/16/2023    Diabetic ulcer of right midfoot associated with diabetes mellitus due to underlying condition, limited to breakdown of skin ([facility]) 06/15/2023    Hypertensive urgency 05/11/2023    Elevated troponin level not due myocardial infarction 05/11/2023    Stable angina pectoris 03/07/2023    Chronic kidney disease-mineral and bone disorder 11/30/2022    Mild aortic stenosis 11/11/2022    Chronic HFpEF/Moderate pHTN (HFpEF) ([facility]) 11/10/2022    Gross hematuria 07/26/2022    Platelets decreased ([facility]) 07/22/2022    Acute kidney injury superimposed on chronic kidney disease  ([facility]) 02/16/2022    Actinic keratoses 01/14/2022    Type 2 diabetes mellitus with diabetic peripheral angiopathy without gangrene, with long-term current use of insulin (HCC) 01/11/2022    Varicose veins of left lower extremity 06/25/2021    History of endovascular stent graft for abdominal aortic aneurysm (AAA) 06/25/2021    Bruit (arterial) 06/25/2021    Peripheral artery disease (HCC) 06/25/2021    Type 2 diabetes mellitus with diabetic polyneuropathy, with long-term current use of insulin ([facility]) 06/10/2021    Mixed hyperlipidemia 05/11/2021    Primary hypertension 05/11/2021    Coronary artery disease involving native coronary artery of native heart without angina  pectoris 05/11/2021    S/P angioplasty with stent 05/11/2021    Hx of CABG 05/11/2021    S/P AAA repair 05/11/2021    S/P prostatectomy 05/11/2021    H/O prostate cancer 05/11/2021     He  reports that he quit smoking about 36 years ago. His smoking use included cigarettes. He started smoking about 68 years ago. He has a 49.5 pack-year smoking history. He has been exposed to tobacco smoke. He has never used smokeless tobacco. He reports that he does not currently use alcohol after a past usage of about 5.0 standard drinks of alcohol per week. He reports that he does not use drugs.  He is allergic to lisinopril..    Review of Systems   Constitutional:  Negative for chills and fever.   HENT:  Negative for congestion and sneezing.    Respiratory:  Negative for cough.    Skin:  Positive for wound.   Psychiatric/Behavioral:  Negative for agitation.          Objective:       Wound 08/15/24 Diabetic Ulcer Heel Left (Active)   Enter Sumner score: Sumner Grade 3: Deep abscess, OM, or joint sepsis 09/23/24 0904   Wound Image Images linked 09/23/24 0904   Wound Description Yellow;White;Slough;Pink;Pale 09/23/24 0904   Clara-wound Assessment Fragile;Maceration 09/23/24 0904   Wound Length (cm) 2.2 cm 09/23/24 0904   Wound Width (cm) 3.9 cm 09/23/24 0904   Wound Depth (cm) 0.6 cm 09/23/24 0904   Wound Surface Area (cm^2) 8.58 cm^2 09/23/24 0904   Wound Volume (cm^3) 5.148 cm^3 09/23/24 0904   Calculated Wound Volume (cm^3) 5.15 cm^3 09/23/24 0904   Change in Wound Size % -2761.11 09/23/24 0904   Drainage Amount Moderate 09/23/24 0904   Drainage Description Yellow;Serosanguineous;Foul smelling 09/23/24 0904   Non-staged Wound Description Full thickness 09/23/24 0904   Dressing Status Intact 09/23/24 0904       Wound 08/15/24 Diabetic Ulcer Plantar Left;Lateral (Active)   Enter Sumner score: Sumner Grade 2: Deep ulcer extended to ligament, tendon, joint capsule, bone, or deep fascia without abscess or osteomyelitis (OM) 09/23/24  0904   Wound Image Images linked 09/23/24 0904   Wound Description Pink;Yellow;Slough 09/23/24 0904   Clara-wound Assessment Dry;Scaly 09/23/24 0904   Wound Length (cm) 1.2 cm 09/23/24 0904   Wound Width (cm) 0.9 cm 09/23/24 0904   Wound Depth (cm) 0.2 cm 09/23/24 0904   Wound Surface Area (cm^2) 1.08 cm^2 09/23/24 0904   Wound Volume (cm^3) 0.216 cm^3 09/23/24 0904   Calculated Wound Volume (cm^3) 0.22 cm^3 09/23/24 0904   Change in Wound Size % -175 09/23/24 0904   Drainage Amount Moderate 09/23/24 0904   Drainage Description Serosanguineous;Yellow 09/23/24 0904   Non-staged Wound Description Full thickness 09/23/24 0904   Dressing Status Intact 09/23/24 0904       Wound 08/15/24 Diabetic Ulcer Toe D2, second Anterior;Left (Active)   Enter Sumner score: Sumner Grade 3: Deep abscess, OM, or joint sepsis 09/23/24 0904   Wound Image Images linked 09/23/24 0904   Wound Description Pink;Yellow 09/23/24 0904   Clara-wound Assessment Callus 09/23/24 0904   Wound Length (cm) 0.3 cm 09/23/24 0904   Wound Width (cm) 0.2 cm 09/23/24 0904   Wound Depth (cm) 0.1 cm 09/23/24 0904   Wound Surface Area (cm^2) 0.06 cm^2 09/23/24 0904   Wound Volume (cm^3) 0.006 cm^3 09/23/24 0904   Calculated Wound Volume (cm^3) 0.01 cm^3 09/23/24 0904   Change in Wound Size % 97.62 09/23/24 0904   Drainage Amount Moderate 09/23/24 0904   Drainage Description Serosanguineous;Yellow 09/23/24 0904   Non-staged Wound Description Full thickness 09/23/24 0904   Dressing Status Intact 09/23/24 0904       /73   Pulse 66   Temp (!) 97 °F (36.1 °C)   Resp 18     Physical Exam  Vitals reviewed.   Constitutional:       General: He is not in acute distress.     Appearance: Normal appearance. He is not ill-appearing, toxic-appearing or diaphoretic.   HENT:      Head: Normocephalic and atraumatic.      Right Ear: External ear normal.      Left Ear: External ear normal.   Eyes:      Conjunctiva/sclera: Conjunctivae normal.   Pulmonary:      Effort:  Pulmonary effort is normal. No respiratory distress.   Musculoskeletal:      Cervical back: Neck supple.   Skin:     Comments: See wound assessment   Neurological:      Mental Status: He is alert.   Psychiatric:         Mood and Affect: Mood normal.         Behavior: Behavior normal.           Wound 08/15/24 Diabetic Ulcer Heel Left (Active)   Enter Sumner score: Sumner Grade 3: Deep abscess, OM, or joint sepsis 09/23/24 0904   Wound Image   09/23/24 0904   Wound Description Yellow;White;Slough;Pink;Pale 09/23/24 0904   Clara-wound Assessment Fragile;Maceration 09/23/24 0904   Wound Length (cm) 2.2 cm 09/23/24 0904   Wound Width (cm) 3.9 cm 09/23/24 0904   Wound Depth (cm) 0.6 cm 09/23/24 0904   Wound Surface Area (cm^2) 8.58 cm^2 09/23/24 0904   Wound Volume (cm^3) 5.148 cm^3 09/23/24 0904   Calculated Wound Volume (cm^3) 5.15 cm^3 09/23/24 0904   Change in Wound Size % -2761.11 09/23/24 0904   Drainage Amount Moderate 09/23/24 0904   Drainage Description Yellow;Serosanguineous;Foul smelling 09/23/24 0904   Non-staged Wound Description Full thickness 09/23/24 0904   Dressing Status Intact 09/23/24 0904       Wound 08/15/24 Diabetic Ulcer Plantar Left;Lateral (Active)   Enter Sumner score: Sumner Grade 2: Deep ulcer extended to ligament, tendon, joint capsule, bone, or deep fascia without abscess or osteomyelitis (OM) 09/23/24 0904   Wound Image   09/23/24 0904   Wound Description Pink;Yellow;Slough 09/23/24 0904   Clara-wound Assessment Dry;Scaly 09/23/24 0904   Wound Length (cm) 1.2 cm 09/23/24 0904   Wound Width (cm) 0.9 cm 09/23/24 0904   Wound Depth (cm) 0.2 cm 09/23/24 0904   Wound Surface Area (cm^2) 1.08 cm^2 09/23/24 0904   Wound Volume (cm^3) 0.216 cm^3 09/23/24 0904   Calculated Wound Volume (cm^3) 0.22 cm^3 09/23/24 0904   Change in Wound Size % -175 09/23/24 0904   Drainage Amount Moderate 09/23/24 0904   Drainage Description Serosanguineous;Yellow 09/23/24 0904   Non-staged Wound Description Full  thickness 09/23/24 0904   Dressing Status Intact 09/23/24 0904       Wound 08/15/24 Diabetic Ulcer Toe D2, second Anterior;Left (Active)   Enter Sumner score: Sumner Grade 3: Deep abscess, OM, or joint sepsis 09/23/24 0904   Wound Image   09/23/24 0904   Wound Description Pink;Yellow 09/23/24 0904   Clara-wound Assessment Callus 09/23/24 0904   Wound Length (cm) 0.3 cm 09/23/24 0904   Wound Width (cm) 0.2 cm 09/23/24 0904   Wound Depth (cm) 0.1 cm 09/23/24 0904   Wound Surface Area (cm^2) 0.06 cm^2 09/23/24 0904   Wound Volume (cm^3) 0.006 cm^3 09/23/24 0904   Calculated Wound Volume (cm^3) 0.01 cm^3 09/23/24 0904   Change in Wound Size % 97.62 09/23/24 0904   Drainage Amount Moderate 09/23/24 0904   Drainage Description Serosanguineous;Yellow 09/23/24 0904   Non-staged Wound Description Full thickness 09/23/24 0904   Dressing Status Intact 09/23/24 0904       Wound 08/30/24 Leg Left (Active)       Wound 09/06/24 Pretibial Left;Proximal (Active)                         Wound Instructions:  Orders Placed This Encounter   Procedures    Wound cleansing and dressings     Left foot wounds:     Hold santyl, do not discard. Store per manufacturers recommendations     Wash your hands with soap and water. Remove old dressing, discard into plastic bag and place in trash. Cleanse the wound with dakins moistened gauze prior to applying a clean dressing. Do not use tissue or cotton balls. Do not scrub the wound. Pat dry using gauze.     Shower no, unless able to keep wounds dry.     Apply lotion to skin surrounding wound   Apply alginate ag to the open wound.   Cover with abd (gauze to toe wound)   Secure with rolled gauze and tape     Change dressing 3 times weekly     Left toe wound:     Wash your hands with soap and water.  Remove old dressing, discard into plastic bag and place in trash.  Cleanse the wound with dakins moistened gauze prior to applying a clean dressing. Do not use tissue or cotton balls. Do not scrub the  wound. Pat dry using gauze.  Shower no, unless able to keep wounds dry      Apply polymem max ag to the open wound.    Cover with gauze  Secure with tape  Change dressing 3 times weekly           Off-loading Instructions: Wear off-loading device as directed by your physician (michael alcala). Put on immediately when rising in the morning and remove when going to bed and Turn every 2 hours. Avoid position directing pressure to Wound site. Limit side lying to 30 degree tilt. Limit the head of bed elevation to 30 degrees. Limit walking and standing to only necessity for activities of daily living.     Wound infection: If you have signs of infection please call the wound center. If the wound center is closedplease go to the Emergency department. Some signs of infection: fever, chills, increased redness, red streaks, increase in pain, increased drainage. Drainage with an odor, Change in drainage color: white/milky/green/tan/yellow, an increase in swelling, chest pain and/or shortness of breath.    Protein: Eat protein with each meal to promote healing. Examples of protein are fish, meat, chicken, nuts, peanut butter, eggs, lentils, edamame or a protein shake.     Continue with [facility] for wound care, Follow instructions for care, do not use bordered foam     Standing Status:   Future     Standing Expiration Date:   9/30/2024    Wound Procedure Treatment     This order was created via procedure documentation    Wound Procedure Treatment Diabetic Ulcer Anterior;Left Toe D2, second     This order was created via procedure documentation        Diagnosis ICD-10-CM Associated Orders   1. Diabetic ulcer of left foot associated with type 2 diabetes mellitus, with fat layer exposed, unspecified part of foot ([facility])  E11.621 lidocaine (LMX) 4 % cream    L97.522 Wound cleansing and dressings     Wound Procedure Treatment     Wound Procedure Treatment Diabetic Ulcer Anterior;Left Toe D2, second      2. Peripheral artery  disease (HCC)  I73.9

## 2024-09-24 ENCOUNTER — OFFICE VISIT (OUTPATIENT)
Dept: PHYSICAL THERAPY | Facility: CLINIC | Age: 79
End: 2024-09-24
Payer: COMMERCIAL

## 2024-09-24 DIAGNOSIS — H83.2X9 VESTIBULAR HYPOFUNCTION, UNSPECIFIED LATERALITY: ICD-10-CM

## 2024-09-24 DIAGNOSIS — E11.42 TYPE 2 DIABETES MELLITUS WITH DIABETIC POLYNEUROPATHY, WITHOUT LONG-TERM CURRENT USE OF INSULIN (HCC): ICD-10-CM

## 2024-09-24 DIAGNOSIS — R26.81 GENERAL UNSTEADINESS: ICD-10-CM

## 2024-09-24 DIAGNOSIS — R42 DIZZINESS: Primary | ICD-10-CM

## 2024-09-24 PROCEDURE — 97112 NEUROMUSCULAR REEDUCATION: CPT

## 2024-09-24 RX ORDER — BLOOD SUGAR DIAGNOSTIC
STRIP MISCELLANEOUS
Qty: 200 STRIP | Refills: 1 | Status: SHIPPED | OUTPATIENT
Start: 2024-09-24

## 2024-09-24 NOTE — PROGRESS NOTES
"Daily Note     Today's date: 2024  Patient name: Thomas Horne  : 1945  MRN: [id number]  Referring provider: Tawana Tate, PT  Dx:   Encounter Diagnosis     ICD-10-CM    1. Dizziness  R42       2. General unsteadiness  R26.81       3. Vestibular hypofunction, unspecified laterality  H83.2X9                      Subjective: Patient reports no \"off feeling,\" nausea, or vision issues today. Patient has not had any dizziness with exercises and reports overall feeling better today.     Objective: See treatment diary below      Assessment: Tolerated treatment well with slight light headed feeling with smooth pursuits that did not linger. Improved coordination and speed with VOR x1 today. Patient does demo some difficulty coordinating VOR x2. Patient would benefit from continued PT. Patient to continue to monitor symptoms. If they remain gone can DC from PT in future session.       Plan: Continue per plan of care.      Precautions: Recent vascular surgery to LE with current wounds on feet, h/o AAA repair   Past Medical History:   Diagnosis Date    Anemia     CAD (coronary artery disease)     Callus     Cancer (HCC)     prostate    CHF (congestive heart failure) (HCC)     Chronic kidney disease     Clotting disorder (HCC)     Coronary artery disease     Deep vein thrombosis (HCC)     Diabetes mellitus (HCC)     Difficulty walking     Duodenal ulcer     Heart disease 1988    Hyperlipidemia     Hypertension     Myocardial infarction (HCC)     Neuropathy     Bilateral feet    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)     Plantar fasciitis     Sleep apnea     Could not tolerate CPAP     SOC: 2024  POC Expiration: 2024  Daily Treatment Log:  Date 2024      Visit # 1 2      Auth         Auth exp        Manual                        There Exer                                                                 HEP Created and discussed  Updated and discussed       There Activ                            " "                            NMReed  30'       VOR x1 seated  30\" x2 vert and horiz       VOR x2 seated  30\" x1 horiz       Smooth pursuits seated   1x10 cw/ccw (slight lightheaded feeling)     1x10 vert     1x10 horiz       Saccades seated   2 pens x20       VOR cancel   20x R/L                               Modalities                                HEP:   Access Code: [email or web address]  URL: [email or web address]  Date: 09/24/2024  Prepared by: Tawana Tate    Program Notes  hand wrote instructions for circular smooth pursuits onto program  - use sticky note or pen and move in a clockwise and counter clockwise motion. Follow with your eyes.   - perform 10x each way     Exercises  - Seated Gaze Stabilization with Head Nod  - 1 x daily - 7 x weekly - 3 reps  - Seated Gaze Stabilization with Head Rotation  - 1 x daily - 7 x weekly - 3 reps  - Seated VOR Cancellation  - 1 x daily - 7 x weekly - 3 reps  - Seated Horizontal Saccades  - 1 x daily - 7 x weekly - 1 sets - 10 reps  - Seated Gaze Stabilization with Head Rotation and Horizontal Arm Movement  - 1 x daily - 7 x weekly - 3 reps         "

## 2024-09-24 NOTE — TELEPHONE ENCOUNTER
Reason for call:   [x] Refill   [] Prior Auth  [] Other:     Office:   [x] PCP/Provider -   [] Specialty/Provider -     Medication: OneTouch Verio Test Strips     Dose/Frequency: Test twice a day     Quantity: 200    Pharmacy: [address] [phone]     Does the patient have enough for 3 days?   [x] Yes   [] No - Send as HP to POD

## 2024-09-26 ENCOUNTER — APPOINTMENT (OUTPATIENT)
Dept: PHYSICAL THERAPY | Facility: CLINIC | Age: 79
End: 2024-09-26
Payer: COMMERCIAL

## 2024-09-26 ENCOUNTER — PATIENT OUTREACH (OUTPATIENT)
Dept: CASE MANAGEMENT | Facility: HOSPITAL | Age: 79
End: 2024-09-26

## 2024-09-26 ENCOUNTER — TELEPHONE (OUTPATIENT)
Dept: PHYSICAL THERAPY | Facility: CLINIC | Age: 79
End: 2024-09-26

## 2024-09-26 DIAGNOSIS — I10 ESSENTIAL HYPERTENSION: ICD-10-CM

## 2024-09-26 NOTE — TELEPHONE ENCOUNTER
Pt canceled his PT appointment today. He is not feeling well after getting his flu shot yesterday.

## 2024-09-26 NOTE — PROGRESS NOTES
Chart reviewed. Patient had PCP apt since last out reach on 9/12 and with Urology on 9/17. Patient continues with OP PT.     There was no answer and a message left with a request for a call back.    RNCM to follow.

## 2024-09-27 RX ORDER — ISOSORBIDE MONONITRATE 30 MG/1
90 TABLET, EXTENDED RELEASE ORAL DAILY
Qty: 270 TABLET | Refills: 1 | Status: SHIPPED | OUTPATIENT
Start: 2024-09-27

## 2024-09-28 DIAGNOSIS — K26.9 DUODENAL ULCER: ICD-10-CM

## 2024-09-28 DIAGNOSIS — I25.10 CORONARY ARTERY DISEASE INVOLVING NATIVE CORONARY ARTERY OF NATIVE HEART WITHOUT ANGINA PECTORIS: ICD-10-CM

## 2024-09-28 DIAGNOSIS — E78.2 MIXED HYPERLIPIDEMIA: ICD-10-CM

## 2024-09-30 ENCOUNTER — OFFICE VISIT (OUTPATIENT)
Dept: WOUND CARE | Facility: HOSPITAL | Age: 79
End: 2024-09-30
Payer: COMMERCIAL

## 2024-09-30 VITALS
HEART RATE: 61 BPM | SYSTOLIC BLOOD PRESSURE: 133 MMHG | TEMPERATURE: 97.2 F | DIASTOLIC BLOOD PRESSURE: 62 MMHG | RESPIRATION RATE: 16 BRPM

## 2024-09-30 DIAGNOSIS — Z79.4 TYPE 2 DIABETES MELLITUS WITH DIABETIC PERIPHERAL ANGIOPATHY WITHOUT GANGRENE, WITH LONG-TERM CURRENT USE OF INSULIN (HCC): ICD-10-CM

## 2024-09-30 DIAGNOSIS — E11.621 DIABETIC ULCER OF LEFT FOOT ASSOCIATED WITH TYPE 2 DIABETES MELLITUS, WITH FAT LAYER EXPOSED, UNSPECIFIED PART OF FOOT (HCC): Primary | ICD-10-CM

## 2024-09-30 DIAGNOSIS — L97.522 DIABETIC ULCER OF LEFT FOOT ASSOCIATED WITH TYPE 2 DIABETES MELLITUS, WITH FAT LAYER EXPOSED, UNSPECIFIED PART OF FOOT (HCC): Primary | ICD-10-CM

## 2024-09-30 DIAGNOSIS — I73.9 PERIPHERAL ARTERY DISEASE (HCC): ICD-10-CM

## 2024-09-30 DIAGNOSIS — E11.51 TYPE 2 DIABETES MELLITUS WITH DIABETIC PERIPHERAL ANGIOPATHY WITHOUT GANGRENE, WITH LONG-TERM CURRENT USE OF INSULIN (HCC): ICD-10-CM

## 2024-09-30 PROCEDURE — 11042 DBRDMT SUBQ TIS 1ST 20SQCM/<: CPT | Performed by: SURGERY

## 2024-09-30 RX ORDER — PANTOPRAZOLE SODIUM 40 MG/1
40 TABLET, DELAYED RELEASE ORAL DAILY
Qty: 90 TABLET | Refills: 1 | Status: SHIPPED | OUTPATIENT
Start: 2024-09-30

## 2024-09-30 RX ORDER — ATORVASTATIN CALCIUM 40 MG/1
40 TABLET, FILM COATED ORAL DAILY
Qty: 90 TABLET | Refills: 1 | Status: SHIPPED | OUTPATIENT
Start: 2024-09-30

## 2024-09-30 RX ORDER — LIDOCAINE 40 MG/G
CREAM TOPICAL ONCE
Status: COMPLETED | OUTPATIENT
Start: 2024-09-30 | End: 2024-09-30

## 2024-09-30 RX ADMIN — LIDOCAINE: 40 CREAM TOPICAL at 09:25

## 2024-09-30 NOTE — PATIENT INSTRUCTIONS
Orders Placed This Encounter   Procedures    Wound cleansing and dressings     Left foot wounds:      Hold santyl, do not discard. Store per manufacturers recommendations      Wash your hands with soap and water. Remove old dressing, discard into plastic bag and place in trash. Cleanse the wound with dakins moistened gauze prior to applying a clean dressing. Do not use tissue or cotton balls. Do not scrub the wound. Pat dry using gauze.      Shower no, unless able to keep wounds dry.      Apply lotion to skin surrounding wound   Apply alginate ag to the open wound.   Cover with abd (gauze to toe wound)   Secure with rolled gauze and tape      Change dressing 3 times weekly      Left toe wound:      Wash your hands with soap and water.  Remove old dressing, discard into plastic bag and place in trash.  Cleanse the wound with dakins moistened gauze prior to applying a clean dressing. Do not use tissue or cotton balls. Do not scrub the wound. Pat dry using gauze.  Shower no, unless able to keep wounds dry       Apply polymem max ag to the open wound.    Cover with gauze  Secure with tape  Change dressing 3 times weekly               Off-loading Instructions: Wear off-loading device as directed by your physician (darco wedge). Put on immediately when rising in the morning and remove when going to bed and Turn every 2 hours. Avoid position directing pressure to Wound site. Limit side lying to 30 degree tilt. Limit the head of bed elevation to 30 degrees. Limit walking and standing to only necessity for activities of daily living.      Wound infection: If you have signs of infection please call the wound center. If the wound center is closedplease go to the Emergency department. Some signs of infection: fever, chills, increased redness, red streaks, increase in pain, increased drainage. Drainage with an odor, Change in drainage color: white/milky/green/tan/yellow, an increase in swelling, chest pain and/or shortness of  breath.     Protein: Eat protein with each meal to promote healing. Examples of protein are fish, meat, chicken, nuts, peanut butter, eggs, lentils, edamame or a protein shake.      Continue with [facility] for wound care, Follow instructions for care, do not use bordered foam     Standing Status:   Future     Standing Expiration Date:   10/7/2024

## 2024-09-30 NOTE — PROGRESS NOTES
Wound Procedure Treatment    Performed by: Graciela Philip RN  Authorized by: Robert Bloch, MD    Associated wounds:   Wound 08/15/24 Diabetic Ulcer Heel Left  Wound 08/15/24 Diabetic Ulcer Plantar Left;Lateral  Wound cleansed with:  Dakin's 0.25% and NSS  Applied primary dressing:  Silver and Calcium alginate  Applied secondary dressing:  ABD  Dressing secured with:  Kerlix and Tape  Offloading device appllied:  Heel offloading boot  Wound Procedure Treatment Diabetic Ulcer Anterior;Left Toe D2, second    Performed by: Graciela Philip RN  Authorized by: Robert Bloch, MD    Associated wounds:   Wound 08/15/24 Diabetic Ulcer Toe D2, second Anterior;Left  Wound cleansed with:  NSS  Applied primary dressing:  Polymem foam and Silver  Applied secondary dressing:  Gauze  Dressing secured with:  Kerlix and Tape

## 2024-09-30 NOTE — LETTER
[facility]  [address] [address]  Phone#  [phone]  Fax#  [phone]    Patient:  Thomas Horne  YOB: 1945  Phone:  [phone]  Date of Visit:  9/30/2024    Orders Placed This Encounter   Procedures   • Wound cleansing and dressings     Left foot wounds:      Hold santyl, do not discard. Store per manufacturers recommendations      Wash your hands with soap and water. Remove old dressing, discard into plastic bag and place in trash. Cleanse the wound with dakins moistened gauze prior to applying a clean dressing. Do not use tissue or cotton balls. Do not scrub the wound. Pat dry using gauze.      Shower no, unless able to keep wounds dry.      Apply lotion to skin surrounding wound   Apply alginate ag to the open wound.   Cover with abd (gauze to toe wound)   Secure with rolled gauze and tape      Change dressing 3 times weekly      Left toe wound:      Wash your hands with soap and water.  Remove old dressing, discard into plastic bag and place in trash.  Cleanse the wound with dakins moistened gauze prior to applying a clean dressing. Do not use tissue or cotton balls. Do not scrub the wound. Pat dry using gauze.  Shower no, unless able to keep wounds dry       Apply polymem max ag to the open wound.    Cover with gauze  Secure with tape  Change dressing 3 times weekly               Off-loading Instructions: Wear off-loading device as directed by your physician (michael alcala). Put on immediately when rising in the morning and remove when going to bed and Turn every 2 hours. Avoid position directing pressure to Wound site. Limit side lying to 30 degree tilt. Limit the head of bed elevation to 30 degrees. Limit walking and standing to only necessity for activities of daily living.      Wound infection: If you have signs of infection please call the wound center. If the wound center is closedplease go to the Emergency department. Some signs of  infection: fever, chills, increased redness, red streaks, increase in pain, increased drainage. Drainage with an odor, Change in drainage color: white/milky/green/tan/yellow, an increase in swelling, chest pain and/or shortness of breath.     Protein: Eat protein with each meal to promote healing. Examples of protein are fish, meat, chicken, nuts, peanut butter, eggs, lentils, edamame or a protein shake.      Continue with [facility] for wound care, Follow instructions for care, do not use bordered foam     Standing Status:   Future     Standing Expiration Date:   10/7/2024         Electronically signed by Robert Bloch, MD

## 2024-09-30 NOTE — PROGRESS NOTES
"This patient ID: Thomas Horne is a 79 y.o. male Date of Birth 1945     Chief Complaint: Diabetic foot ulcer x 3 left lower extremity   Chief Complaint   Patient presents with    Follow Up Wound Care Visit     MARY SAMUEL foot       Allergies  Lisinopril    Assessment: He now has a palpable pulse and the toe wound actually bled after I debrided it.  However I do not like the heel wound.  This is a Sumner 3 and I do not think it is progressing well.  Accordingly I want him to have a formal HBO consult    No problem-specific Assessment & Plan notes found for this encounter.         Problem List Items Addressed This Visit       Peripheral artery disease (HCC)    Relevant Medications    lidocaine (LMX) 4 % cream (Completed)    Other Relevant Orders    Wound cleansing and dressings    Wound Procedure Treatment    Wound Procedure Treatment Diabetic Ulcer Anterior;Left Toe D2, second    Type 2 diabetes mellitus with diabetic peripheral angiopathy without gangrene, with long-term current use of insulin (HCC)    Relevant Medications    lidocaine (LMX) 4 % cream (Completed)    Other Relevant Orders    Wound cleansing and dressings    Wound Procedure Treatment    Wound Procedure Treatment Diabetic Ulcer Anterior;Left Toe D2, second     Other Visit Diagnoses       Diabetic ulcer of left foot associated with type 2 diabetes mellitus, with fat layer exposed, unspecified part of foot (HCC)    -  Primary    Relevant Medications    lidocaine (LMX) 4 % cream (Completed)    Other Relevant Orders    Wound cleansing and dressings    Wound Procedure Treatment    Wound Procedure Treatment Diabetic Ulcer Anterior;Left Toe D2, second                  Debridement   Wound 08/15/24 Diabetic Ulcer Heel Left    Universal Protocol:  Consent: Verbal consent obtained.  Consent given by: patient  Time out: Immediately prior to procedure a \"time out\" was called to verify the correct patient, procedure, equipment, support staff and site/side marked as " "required.  Patient identity confirmed: verbally with patient    Debridement Details  Performed by: physician  Debridement type: surgical  Level of debridement: subcutaneous tissue  Pain control: lidocaine 4%      Post-debridement measurements  Length (cm): 2  Width (cm): 3.5  Depth (cm): 0.6  Percent debrided: 70%  Surface Area (cm^2): 7  Area Debrided (cm^2): 4.9  Volume (cm^3): 4.2    Tissue and other material debrided: muscle and subcutaneous tissue  Devitalized tissue debrided: exudate, necrotic debris and slough  Instrument(s) utilized: scissors and forceps  Technique utilized: excisionalBleeding: medium  Hemostasis obtained with: pressure  Procedural pain (0-10): insensate  Post-procedural pain: insensate   Response to treatment: procedure was tolerated well    Debridement   Wound 08/15/24 Diabetic Ulcer Toe D2, second Anterior;Left    Universal Protocol:  Consent: Verbal consent obtained.  Consent given by: patient  Time out: Immediately prior to procedure a \"time out\" was called to verify the correct patient, procedure, equipment, support staff and site/side marked as required.    Debridement Details  Performed by: physician  Debridement type: surgical  Level of debridement: subcutaneous tissue  Pain control: lidocaine 4%      Post-debridement measurements  Length (cm): 0.8  Width (cm): 0.6  Depth (cm): 0.3  Percent debrided: 100%  Surface Area (cm^2): 0.48  Area Debrided (cm^2): 0.48  Volume (cm^3): 0.14    Tissue and other material debrided: subcutaneous tissue  Devitalized tissue debrided: exudate and necrotic debris  Instrument(s) utilized: scissors and forceps  Technique utilized: excisionalBleeding: medium  Hemostasis obtained with: pressure  Procedural pain (0-10): insensate  Post-procedural pain: insensate         Plan:     Wound 08/15/24 Diabetic Ulcer Heel Left (Active)   Enter Sumner score: Sumner Grade 3: Deep abscess, OM, or joint sepsis 09/30/24 0900   Wound Image Images linked 09/30/24 0937 "   Wound Description Flores;Pale;Pink;Gray 09/30/24 0900   Clara-wound Assessment Maceration;Fragile 09/30/24 0900   Wound Length (cm) 2 cm 09/30/24 0900   Wound Width (cm) 3.5 cm 09/30/24 0900   Wound Depth (cm) 0.6 cm 09/30/24 0900   Wound Surface Area (cm^2) 7 cm^2 09/30/24 0900   Wound Volume (cm^3) 4.2 cm^3 09/30/24 0900   Calculated Wound Volume (cm^3) 4.2 cm^3 09/30/24 0900   Change in Wound Size % -2233.33 09/30/24 0900   Drainage Amount Moderate 09/30/24 0900   Drainage Description Yellow;Serosanguineous;Foul smelling 09/30/24 0900   Non-staged Wound Description Full thickness 09/30/24 0900   Dressing Status Intact;Leaking (Comment) (upon arrival) 09/30/24 0900       Wound 08/15/24 Diabetic Ulcer Plantar Left;Lateral (Active)   Enter Sumner score: Sumner Grade 2: Deep ulcer extended to ligament, tendon, joint capsule, bone, or deep fascia without abscess or osteomyelitis (OM) 09/30/24 0909   Wound Image Images linked 09/30/24 0911   Wound Description Pink;Yellow;Slough 09/30/24 0909   Clara-wound Assessment Dry;Scaly 09/30/24 0909   Wound Length (cm) 0.7 cm 09/30/24 0909   Wound Width (cm) 0.8 cm 09/30/24 0909   Wound Depth (cm) 0.2 cm 09/30/24 0909   Wound Surface Area (cm^2) 0.56 cm^2 09/30/24 0909   Wound Volume (cm^3) 0.112 cm^3 09/30/24 0909   Calculated Wound Volume (cm^3) 0.11 cm^3 09/30/24 0909   Change in Wound Size % -37.5 09/30/24 0909   Drainage Amount Small 09/30/24 0909   Drainage Description Serosanguineous;Yellow 09/30/24 0909   Dressing Status Intact (upon arrival) 09/30/24 0909       Wound 08/15/24 Diabetic Ulcer Toe D2, second Anterior;Left (Active)   Enter Sumner score: Sumner Grade 3: Deep abscess, OM, or joint sepsis 09/30/24 0906   Wound Image Images linked 09/30/24 0936   Wound Description Brown;Dry 09/30/24 0906   Clara-wound Assessment Fragile 09/30/24 0906   Wound Length (cm) 0.4 cm 09/30/24 0906   Wound Width (cm) 0.4 cm 09/30/24 0906   Wound Depth (cm) 0.2 cm 09/30/24 0906   Wound  Surface Area (cm^2) 0.16 cm^2 09/30/24 0906   Wound Volume (cm^3) 0.032 cm^3 09/30/24 0906   Calculated Wound Volume (cm^3) 0.03 cm^3 09/30/24 0906   Change in Wound Size % 92.86 09/30/24 0906   Number of underminings 1 09/30/24 0906   Undermining 1 0.2 09/30/24 0906   Undermining 1 is depth extending from 12-12 09/30/24 0906   Drainage Amount Small 09/30/24 0906   Drainage Description Serosanguineous;Purulent 09/30/24 0906   Non-staged Wound Description Full thickness 09/30/24 0906   Dressing Status Intact (upon arrival) 09/30/24 0906       Wound 08/15/24 Diabetic Ulcer Heel Left (Active)   Date First Assessed/Time First Assessed: 08/15/24 0943   Primary Wound Type: Diabetic Ulcer  Location: Heel  Wound Location Orientation: Left       Wound 08/15/24 Diabetic Ulcer Plantar Left;Lateral (Active)   Date First Assessed/Time First Assessed: 08/15/24 0943   Primary Wound Type: Diabetic Ulcer  Location: Plantar  Wound Location Orientation: Left;Lateral       Wound 08/15/24 Diabetic Ulcer Toe D2, second Anterior;Left (Active)   Date First Assessed/Time First Assessed: 08/15/24 0943   Primary Wound Type: Diabetic Ulcer  Location: Toe D2, second  Wound Location Orientation: Anterior;Left       [REMOVED] Wound 12/21/23 Diabetic Ulcer Ankle Posterior;Right (Removed)   Resolved Date: 08/15/24  Date First Assessed/Time First Assessed: 12/21/23 0954   Primary Wound Type: Diabetic Ulcer  Location: Ankle  Wound Location Orientation: Posterior;Right  Wound Description (Comments): MACDONALD 2  Wound Outcome: Unknown (No long...       [REMOVED] Wound 08/07/24 Groin Left (Removed)   Resolved Date: 08/22/24  Date First Assessed/Time First Assessed: 08/07/24 0927   Location: Groin  Wound Location Orientation: Left  Incision's 1st Dressing: ADHESIVE SKIN HIGH VISCOSITY EXOFIN PRECISION PEN (x0)  Wound Outcome: Unknown (No longer pre...       [REMOVED] Wound 08/30/24 Leg Left (Removed)   Resolved Date: 09/30/24  Date First Assessed/Time  First Assessed: 08/30/24 1124   Location: Leg  Wound Location Orientation: Left  Wound Description (Comments): UPPER LEG AND LOWER LEG- MEPILEX  Incision's 1st Dressing: DRESSING MEPILEX AG BORDER POST...       [REMOVED] Wound 09/06/24 Pretibial Left;Proximal (Removed)   Resolved Date: 09/30/24  Date First Assessed/Time First Assessed: 09/06/24 1751   Location: Pretibial  Wound Location Orientation: Left;Proximal  Wound Outcome: (c) Other (Comment)       [REMOVED] Wound 09/06/24 Thigh Anterior;Left (Removed)   Resolved Date: 09/16/24  Date First Assessed/Time First Assessed: 09/06/24 1752   Location: Thigh  Wound Location Orientation: Anterior;Left  Wound Outcome: (c) Other (Comment)       [REMOVED] Wound 09/06/24 Ankle Anterior;Left (Removed)   Resolved Date: 09/09/24  Date First Assessed/Time First Assessed: 09/06/24 1804   Location: Ankle  Wound Location Orientation: Anterior;Left  Wound Outcome: Unknown (No longer present)       [REMOVED] Wound 09/06/24 Toe D2, second Anterior;Left (Removed)   Resolved Date: 09/09/24  Date First Assessed/Time First Assessed: 09/06/24 1804   Location: Toe D2, second  Wound Location Orientation: Anterior;Left  Wound Outcome: Unknown (No longer present)       [REMOVED] Wound 09/06/24 Pedal Anterior;Left (Removed)   Resolved Date: 09/09/24  Date First Assessed/Time First Assessed: 09/06/24 1805   Location: Pedal  Wound Location Orientation: Anterior;Left  Wound Outcome: Unknown (No longer present)       Subjective:      .    HPI: 79-year-old white male with Sumner grade 3 ulcer left heel.  He has been revascularized recently on that side and a year ago on the contralateral side    The following portions of the patient's history were reviewed and updated as appropriate: allergies, current medications, past family history, past medical history, past social history, past surgical history, and problem list.    Review of Systems      Objective:       Wound 08/15/24 Diabetic Ulcer Heel  Left (Active)   Enter Sumner score: Sumner Grade 3: Deep abscess, OM, or joint sepsis 09/30/24 0900   Wound Image Images linked 09/30/24 0937   Wound Description Flores;Pale;Pink;Gray 09/30/24 0900   Clara-wound Assessment Maceration;Fragile 09/30/24 0900   Wound Length (cm) 2 cm 09/30/24 0900   Wound Width (cm) 3.5 cm 09/30/24 0900   Wound Depth (cm) 0.6 cm 09/30/24 0900   Wound Surface Area (cm^2) 7 cm^2 09/30/24 0900   Wound Volume (cm^3) 4.2 cm^3 09/30/24 0900   Calculated Wound Volume (cm^3) 4.2 cm^3 09/30/24 0900   Change in Wound Size % -2233.33 09/30/24 0900   Drainage Amount Moderate 09/30/24 0900   Drainage Description Yellow;Serosanguineous;Foul smelling 09/30/24 0900   Non-staged Wound Description Full thickness 09/30/24 0900   Dressing Status Intact;Leaking (Comment) (upon arrival) 09/30/24 0900       Wound 08/15/24 Diabetic Ulcer Plantar Left;Lateral (Active)   Enter Sumner score: Sumner Grade 2: Deep ulcer extended to ligament, tendon, joint capsule, bone, or deep fascia without abscess or osteomyelitis (OM) 09/30/24 0909   Wound Image Images linked 09/30/24 0911   Wound Description Pink;Yellow;Slough 09/30/24 0909   Clara-wound Assessment Dry;Scaly 09/30/24 0909   Wound Length (cm) 0.7 cm 09/30/24 0909   Wound Width (cm) 0.8 cm 09/30/24 0909   Wound Depth (cm) 0.2 cm 09/30/24 0909   Wound Surface Area (cm^2) 0.56 cm^2 09/30/24 0909   Wound Volume (cm^3) 0.112 cm^3 09/30/24 0909   Calculated Wound Volume (cm^3) 0.11 cm^3 09/30/24 0909   Change in Wound Size % -37.5 09/30/24 0909   Drainage Amount Small 09/30/24 0909   Drainage Description Serosanguineous;Yellow 09/30/24 0909   Dressing Status Intact (upon arrival) 09/30/24 0909       Wound 08/15/24 Diabetic Ulcer Toe D2, second Anterior;Left (Active)   Enter Sumner score: Sumner Grade 3: Deep abscess, OM, or joint sepsis 09/30/24 0906   Wound Image Images linked 09/30/24 0936   Wound Description Brown;Dry 09/30/24 0906   Clara-wound Assessment Fragile  09/30/24 0906   Wound Length (cm) 0.4 cm 09/30/24 0906   Wound Width (cm) 0.4 cm 09/30/24 0906   Wound Depth (cm) 0.2 cm 09/30/24 0906   Wound Surface Area (cm^2) 0.16 cm^2 09/30/24 0906   Wound Volume (cm^3) 0.032 cm^3 09/30/24 0906   Calculated Wound Volume (cm^3) 0.03 cm^3 09/30/24 0906   Change in Wound Size % 92.86 09/30/24 0906   Number of underminings 1 09/30/24 0906   Undermining 1 0.2 09/30/24 0906   Undermining 1 is depth extending from 12-12 09/30/24 0906   Drainage Amount Small 09/30/24 0906   Drainage Description Serosanguineous;Purulent 09/30/24 0906   Non-staged Wound Description Full thickness 09/30/24 0906   Dressing Status Intact (upon arrival) 09/30/24 0906       /62   Pulse 61   Temp (!) 97.2 °F (36.2 °C)   Resp 16     Physical Exam  Constitutional:       Appearance: He is normal weight. He is not ill-appearing.   Musculoskeletal:        Feet:    Feet:      Comments: Ulcers as shown  Neurological:      Mental Status: He is alert.           Wound Instructions:  No orders of the defined types were placed in this encounter.       Diagnosis ICD-10-CM Associated Orders   1. Diabetic ulcer of left foot associated with type 2 diabetes mellitus, with fat layer exposed, unspecified part of foot ([facility])  E11.621 lidocaine (LMX) 4 % cream    L97.522       2. Peripheral artery disease ([facility])  I73.9 lidocaine (LMX) 4 % cream      3. Type 2 diabetes mellitus with diabetic peripheral angiopathy without gangrene, with long-term current use of insulin ([facility])  E11.51 lidocaine (LMX) 4 % cream    Z79.4

## 2024-10-01 ENCOUNTER — OFFICE VISIT (OUTPATIENT)
Dept: PHYSICAL THERAPY | Facility: CLINIC | Age: 79
End: 2024-10-01
Payer: COMMERCIAL

## 2024-10-01 DIAGNOSIS — R26.81 GENERAL UNSTEADINESS: ICD-10-CM

## 2024-10-01 DIAGNOSIS — H83.2X9 VESTIBULAR HYPOFUNCTION, UNSPECIFIED LATERALITY: ICD-10-CM

## 2024-10-01 DIAGNOSIS — R42 DIZZINESS: Primary | ICD-10-CM

## 2024-10-01 PROCEDURE — 97112 NEUROMUSCULAR REEDUCATION: CPT

## 2024-10-01 NOTE — PROGRESS NOTES
Re-Evaluation / Discharge    Today's date: 10/1/2024  Patient name: Thomas Horne  : 1945  MRN: [id number]  Referring provider: Tawana Tate PT  Dx:   Encounter Diagnosis     ICD-10-CM    1. Dizziness  R42       2. General unsteadiness  R26.81       3. Vestibular hypofunction, unspecified laterality  H83.2X9                      Assessment  Impairments: abnormal gait, abnormal or restricted ROM and impaired balance  Impairments comments: secondary to foot wounds  Functional limitations: limitations secondary to procedure precautions (vascular procedure to LLE) and wound on L great toe and heel    Assessment details: Thomas Horne is a 79 y.o. male who presents for re-evaluation of unsteadiness, dizziness, nausea, peripheral vision issues, positive oculomotor testing. Patient no longer suffering from symptoms and is independent with HEP. Will DC at this time. Patient has been educated in home exercise program and ability to call and return should symptoms return.   Understanding of Dx/Px/POC: good     Prognosis: good    Goals  Vestibular Short Term Goals:  - Patient will be independent with simple HEP---MET  - Patient will tolerate 60 seconds of oculomotor exercises with minimal increase in symptoms---MET  - Patient will demonstrate 10% decrease in symptom severity scoring with independent use of modalities---MET  - Patient will report 2/10 dizziness or less with visual stimulating surround with duration of 2 minutes ---MET      Vestibular Long Term Goals:  - Patient will display decreased forward head and rounded shoulders to promote improved resting posture and cervical mobility--not met with early DC   - Patient will be independent with complex HEP---MET  - Patient will tolerate >=2 minutes of oculomotor exercises to facilitate return to reading and computer work---MET  - Patient will report >= 50% improvement on symptom severity scoring---MET  - Patient will report baseline dizziness of 1/10 or less  "---MET  - Patient will report subjective improvement to 90% or higher to promote return to PLOF---MET        Plan    Planned therapy interventions: home exercise program and patient education    Frequency: 2-3x week  Plan of Care beginning date: 9/16/2024  Plan of Care expiration date: 12/9/2024  Treatment plan discussed with: patient  Plan details: Patient to DC to HEP           Subjective Evaluation    History of Present Illness  Mechanism of injury: Patient reports he is still doing well and has no dizziness or vision issues. Patient is still limited in WB and balance to wound on heel. Patient in agreement to re-evaluate today and DC should testing be clear.   Pain  At worst pain rating: 3  Location: L foot (great toe and heel wound)  Quality: dull ache  Relieving factors: relaxation and rest  Aggravating factors: standing and walking  Symptom course: has on going wound care to L foot and calf.    Social Support  Stairs in house: yes   17  Lives in: multiple-level home  Lives with: adult children    Employment status: not working  Treatments  Previous treatment: physical therapy (balance)        Objective                                                                      Objective     Dizziness Subjective  How long does dizziness last: no longer present  How would you describe the dizziness: \"like things are uneven/ off balance\"   Rolling in bed: No  Supine to/from sit: No (has not had in last week and a half)  Tinnitus: Sometimes but does not last long       Vestibular Objective  Cervical Spine AROM:  - Flexion: minimal limitation no pain  - Extension: minimal limitation no pain  - R Rotation: WFL no pain  - L Rotation: WFL no pain  - R Lateral Flexion: minimal limitation no pain  - L Lateral Flexion: minimal limitation no pain    Integrity Testing  - mVBI: neg B/L   - Sharp Radha: neg B/L   - Alar Stability Test: intact   - Posture: mild fwd head       Coordination Screen  - Dysmetria: intact for speed and " "accuracy   - Dysdiadochokinesia: intact for speed and accuracy     Oculomotor Screen  - Baseline Symptoms: 0/10  - Baseline Observation: \"spotty\" \"peripheral vision is funky and I feel nauseous\"   - Gaze Holding Nystagmus: H: Normal  - Spontaneous Nystagmus Room Light: H: Normal   - Smooth Pursuits (central): H: Normal and V: Normal   - Saccades (central): H: Normal and V: Normal   - VORx1: H: Normal Observation: improved speed with no symptoms    - VOR Cancel (central): H: Normal   - Head Shaking Test (mild hypofunction): H: Normal       Positional testing (tested on 9/16/24)   [place] R: neg  [place] L:  neg  Roll test R: neg  Roll test L: neg        Outcome Measures Initial Eval  9/16/2024 Re-eval   10/1/2024       mCTSIB  - FTEO (firm)  - FTEC (firm)  - FTEO (foam)  - FTEC (foam)   Held due to WB status and vascular surgery          FGA Held due to WB status and vascular surgery         DHI 30/100 2/100                                                                Precautions: Recent vascular surgery to LE with current wounds on feet, h/o AAA repair   Past Medical History:   Diagnosis Date    Anemia     CAD (coronary artery disease)     Callus     Cancer (HCC)     prostate    CHF (congestive heart failure) (HCC)     Chronic kidney disease     Clotting disorder (HCC)     Coronary artery disease 1988    Deep vein thrombosis (HCC)     Diabetes mellitus (HCC)     Difficulty walking     Duodenal ulcer     Heart disease 1988    Hyperlipidemia     Hypertension     Myocardial infarction (HCC)     Neuropathy     Bilateral feet    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)     Plantar fasciitis     Sleep apnea     Could not tolerate CPAP     SOC: 9/16/2024  POC Expiration: 12/9/2024  Daily Treatment Log:  Date 9/16/2024 9/24/2024 10/1/2024     Visit # 1 2 3     Auth         Auth exp        Manual                        There Exer                                                                 HEP Created and discussed  " "Updated and discussed       There Activ                                                        NMReed  30'       VOR x1 seated  30\" x2 vert and horiz  30\" x2 vert and horiz      VOR x2 seated  30\" x1 horiz  30\" x1 horiz      Smooth pursuits seated   1x10 cw/ccw (slight lightheaded feeling)     1x10 vert     1x10 horiz  1x10 cw/ccw no symptoms      Saccades seated   2 pens x20  2 pens x20      VOR cancel   20x R/L  20x R/L                      Oculomotor testing   EG     Modalities                                HEP:   Access Code: [email or web address]  URL: [email or web address]  Date: 09/24/2024  Prepared by: Tawana Tate    Program Notes  hand wrote instructions for circular smooth pursuits onto program  - use sticky note or pen and move in a clockwise and counter clockwise motion. Follow with your eyes.   - perform 10x each way     Exercises  - Seated Gaze Stabilization with Head Nod  - 1 x daily - 7 x weekly - 3 reps  - Seated Gaze Stabilization with Head Rotation  - 1 x daily - 7 x weekly - 3 reps  - Seated VOR Cancellation  - 1 x daily - 7 x weekly - 3 reps  - Seated Horizontal Saccades  - 1 x daily - 7 x weekly - 1 sets - 10 reps  - Seated Gaze Stabilization with Head Rotation and Horizontal Arm Movement  - 1 x daily - 7 x weekly - 3 reps         "

## 2024-10-03 ENCOUNTER — OFFICE VISIT (OUTPATIENT)
Dept: VASCULAR SURGERY | Facility: CLINIC | Age: 79
End: 2024-10-03

## 2024-10-03 VITALS
WEIGHT: 226 LBS | BODY MASS INDEX: 29 KG/M2 | HEIGHT: 74 IN | DIASTOLIC BLOOD PRESSURE: 60 MMHG | HEART RATE: 79 BPM | SYSTOLIC BLOOD PRESSURE: 116 MMHG

## 2024-10-03 DIAGNOSIS — I73.9 PERIPHERAL ARTERY DISEASE (HCC): Primary | ICD-10-CM

## 2024-10-03 DIAGNOSIS — I70.223 REST PAIN OF BOTH LOWER EXTREMITIES DUE TO ATHEROSCLEROSIS (HCC): ICD-10-CM

## 2024-10-03 PROCEDURE — 99024 POSTOP FOLLOW-UP VISIT: CPT | Performed by: SURGERY

## 2024-10-03 NOTE — PROGRESS NOTES
Ambulatory Visit  Name: Thomas Horne      : 1945      MRN: [id number]  Encounter Provider: Vasquez Clark MD  Encounter Date: 10/3/2024   [facility] department: [facility]    Assessment & Plan  Rest pain of both lower extremities due to atherosclerosis (HCC)    Orders:    Ambulatory referral to Vascular Surgery    Peripheral artery disease (HCC)  Left lower extremity critical limb threatening ischemia with tissue loss status post left above-knee to below-knee popliteal artery bypass presenting for postop evaluation.  Staples were removed upon last visit.  He will continue with surveillance duplex ultrasonography and ongoing podiatric care in the meantime.  The patient is on appropriate medical therapy to include aspirin Plavix and a statin.           History of Present Illness     Thomas Horne is a 79 y.o. male who presents in follow-up after his left lower extremity distal SFA/AK pop to below-knee popliteal artery bypass on .  He is doing well with podiatric debridements ongoing and wound care management.  He is being considered for hyperbaric therapy.  He has no incisional issues.  His staples were removed last week and there is no dehiscence, erythema or drainage.  He is overall very satisfied with the procedure and has begun ambulating again.    Patient is s/p L distal SFA- BK pop bypass done 24 presents today for post-op visit.       Review of Systems   Constitutional: Negative.    HENT: Negative.     Eyes: Negative.    Respiratory: Negative.     Cardiovascular: Negative.    Gastrointestinal: Negative.    Endocrine: Negative.    Genitourinary: Negative.    Musculoskeletal: Negative.    Skin:  Positive for wound.   Allergic/Immunologic: Negative.    Neurological: Negative.    Hematological: Negative.    Psychiatric/Behavioral: Negative.             Objective     /60 (BP Location: Right arm, Patient Position: Sitting, Cuff Size: Standard)   Pulse 79   " Ht 6' 2\" (1.88 m)   Wt 103 kg (226 lb)   BMI 29.02 kg/m²     Physical Exam  Vitals and nursing note reviewed.   Constitutional:       General: He is not in acute distress.     Appearance: He is well-developed.   HENT:      Head: Normocephalic and atraumatic.   Eyes:      Conjunctiva/sclera: Conjunctivae normal.   Cardiovascular:      Rate and Rhythm: Normal rate and regular rhythm.      Pulses:           Dorsalis pedis pulses are detected w/ Doppler on the left side.        Posterior tibial pulses are detected w/ Doppler on the left side.      Heart sounds: No murmur heard.     Comments: Multiphasic left DP and PT signal.  Well-healed surgical incisions.  Pulmonary:      Effort: Pulmonary effort is normal. No respiratory distress.      Breath sounds: Normal breath sounds.   Abdominal:      Palpations: Abdomen is soft.      Tenderness: There is no abdominal tenderness.   Musculoskeletal:         General: No swelling.      Cervical back: Neck supple.   Skin:     General: Skin is warm and dry.      Capillary Refill: Capillary refill takes less than 2 seconds.   Neurological:      Mental Status: He is alert.   Psychiatric:         Mood and Affect: Mood normal.         "

## 2024-10-03 NOTE — ASSESSMENT & PLAN NOTE
Left lower extremity critical limb threatening ischemia with tissue loss status post left above-knee to below-knee popliteal artery bypass presenting for postop evaluation.  Staples were removed upon last visit.  He will continue with surveillance duplex ultrasonography and ongoing podiatric care in the meantime.  The patient is on appropriate medical therapy to include aspirin Plavix and a statin.

## 2024-10-03 NOTE — LETTER
October 3, 2024     Frank Lombardi DO  [address]  [address]  [place] [facility] [place] [address]    Patient: Thomas Horne   YOB: 1945   Date of Visit: 10/3/2024       Dear Dr. Lombardi:    Thank you for referring Thomas Horne to me for evaluation. Below are my notes for this consultation.    If you have questions, please do not hesitate to call me. I look forward to following your patient along with you.         Sincerely,        Vasquez Clark MD        CC: Sundar Arango DPM  Thomas Horne    Vasquez Clark MD  10/3/2024  2:56 PM  Sign when Signing Visit  Ambulatory Visit  Name: Thomas Horne      : 1945      MRN: [id number]  Encounter Provider: Vasquez Clark MD  Encounter Date: 10/3/2024   Enc[facility] department: [facility]    Assessment & Plan  Rest pain of both lower extremities due to atherosclerosis (HCC)    Orders:  •  Ambulatory referral to Vascular Surgery    Peripheral artery disease (HCC)  Left lower extremity critical limb threatening ischemia with tissue loss status post left above-knee to below-knee popliteal artery bypass presenting for postop evaluation.  Staples were removed upon last visit.  He will continue with surveillance duplex ultrasonography and ongoing podiatric care in the meantime.  The patient is on appropriate medical therapy to include aspirin Plavix and a statin.           History of Present Illness    Thomas Horne is a 79 y.o. male who presents in follow-up after his left lower extremity distal SFA/AK pop to below-knee popliteal artery bypass on .  He is doing well with podiatric debridements ongoing and wound care management.  He is being considered for hyperbaric therapy.  He has no incisional issues.  His staples were removed last week and there is no dehiscence, erythema or drainage.  He is overall very satisfied with the procedure and has begun ambulating again.    Patient is s/p L distal SFA- BK  "pop bypass done 8/30/24 presents today for post-op visit.       Review of Systems   Constitutional: Negative.    HENT: Negative.     Eyes: Negative.    Respiratory: Negative.     Cardiovascular: Negative.    Gastrointestinal: Negative.    Endocrine: Negative.    Genitourinary: Negative.    Musculoskeletal: Negative.    Skin:  Positive for wound.   Allergic/Immunologic: Negative.    Neurological: Negative.    Hematological: Negative.    Psychiatric/Behavioral: Negative.             Objective    /60 (BP Location: Right arm, Patient Position: Sitting, Cuff Size: Standard)   Pulse 79   Ht 6' 2\" (1.88 m)   Wt 103 kg (226 lb)   BMI 29.02 kg/m²     Physical Exam  Vitals and nursing note reviewed.   Constitutional:       General: He is not in acute distress.     Appearance: He is well-developed.   HENT:      Head: Normocephalic and atraumatic.   Eyes:      Conjunctiva/sclera: Conjunctivae normal.   Cardiovascular:      Rate and Rhythm: Normal rate and regular rhythm.      Pulses:           Dorsalis pedis pulses are detected w/ Doppler on the left side.        Posterior tibial pulses are detected w/ Doppler on the left side.      Heart sounds: No murmur heard.     Comments: Multiphasic left DP and PT signal.  Well-healed surgical incisions.  Pulmonary:      Effort: Pulmonary effort is normal. No respiratory distress.      Breath sounds: Normal breath sounds.   Abdominal:      Palpations: Abdomen is soft.      Tenderness: There is no abdominal tenderness.   Musculoskeletal:         General: No swelling.      Cervical back: Neck supple.   Skin:     General: Skin is warm and dry.      Capillary Refill: Capillary refill takes less than 2 seconds.   Neurological:      Mental Status: He is alert.   Psychiatric:         Mood and Affect: Mood normal.         "

## 2024-10-03 NOTE — PATIENT INSTRUCTIONS
1. Peripheral artery disease (HCC)  Assessment & Plan:  Left lower extremity critical limb threatening ischemia with tissue loss status post left above-knee to below-knee popliteal artery bypass presenting for postop evaluation.  Staples were removed upon last visit.  He will continue with surveillance duplex ultrasonography and ongoing podiatric care in the meantime.  The patient is on appropriate medical therapy to include aspirin Plavix and a statin.         2. Rest pain of both lower extremities due to atherosclerosis (HCC)  -     Ambulatory referral to Vascular Surgery

## 2024-10-07 ENCOUNTER — OFFICE VISIT (OUTPATIENT)
Dept: WOUND CARE | Facility: HOSPITAL | Age: 79
End: 2024-10-07
Payer: COMMERCIAL

## 2024-10-07 ENCOUNTER — PATIENT OUTREACH (OUTPATIENT)
Dept: CASE MANAGEMENT | Facility: HOSPITAL | Age: 79
End: 2024-10-07

## 2024-10-07 VITALS
DIASTOLIC BLOOD PRESSURE: 74 MMHG | RESPIRATION RATE: 16 BRPM | HEART RATE: 64 BPM | TEMPERATURE: 97.4 F | SYSTOLIC BLOOD PRESSURE: 136 MMHG

## 2024-10-07 DIAGNOSIS — I73.9 PERIPHERAL ARTERY DISEASE (HCC): ICD-10-CM

## 2024-10-07 DIAGNOSIS — E11.621 DIABETIC ULCER OF LEFT FOOT ASSOCIATED WITH TYPE 2 DIABETES MELLITUS, WITH MUSCLE INVOLVEMENT WITHOUT EVIDENCE OF NECROSIS, UNSPECIFIED PART OF FOOT (HCC): Primary | ICD-10-CM

## 2024-10-07 DIAGNOSIS — Z79.4 TYPE 2 DIABETES MELLITUS WITH DIABETIC PERIPHERAL ANGIOPATHY WITHOUT GANGRENE, WITH LONG-TERM CURRENT USE OF INSULIN (HCC): ICD-10-CM

## 2024-10-07 DIAGNOSIS — L97.525 DIABETIC ULCER OF LEFT FOOT ASSOCIATED WITH TYPE 2 DIABETES MELLITUS, WITH MUSCLE INVOLVEMENT WITHOUT EVIDENCE OF NECROSIS, UNSPECIFIED PART OF FOOT (HCC): Primary | ICD-10-CM

## 2024-10-07 DIAGNOSIS — E11.51 TYPE 2 DIABETES MELLITUS WITH DIABETIC PERIPHERAL ANGIOPATHY WITHOUT GANGRENE, WITH LONG-TERM CURRENT USE OF INSULIN (HCC): ICD-10-CM

## 2024-10-07 PROCEDURE — 11042 DBRDMT SUBQ TIS 1ST 20SQCM/<: CPT | Performed by: FAMILY MEDICINE

## 2024-10-07 PROCEDURE — NC001 PR NO CHARGE: Performed by: FAMILY MEDICINE

## 2024-10-07 RX ORDER — LIDOCAINE 40 MG/G
CREAM TOPICAL ONCE
Status: COMPLETED | OUTPATIENT
Start: 2024-10-07 | End: 2024-10-07

## 2024-10-07 RX ADMIN — LIDOCAINE: 40 CREAM TOPICAL at 11:22

## 2024-10-07 NOTE — PROGRESS NOTES
Wound Procedure Treatment Diabetic Ulcer Anterior;Left Toe D2, second    Performed by: Evelin Ruiz RN  Authorized by: Nitza Sotelo DO    Associated wounds:   Wound 08/15/24 Diabetic Ulcer Toe D2, second Anterior;Left  Wound cleansed with:  NSS  Applied to periwound:  Moisture lotion  Applied primary dressing:  Calcium alginate and Silver  Applied secondary dressing:  ABD  Dressing secured with:  Ender and Tape  Wound Procedure Treatment Diabetic Ulcer Anterior;Left Toe D2, second    Performed by: Evelin Ruiz RN  Authorized by: Nitza Sotelo DO    Associated wounds:   Wound 08/15/24 Diabetic Ulcer Toe D2, second Anterior;Left  Wound cleansed with:  NSS  Applied primary dressing:  Acticoat  Applied secondary dressing:  Gauze  Dressing secured with:  Ender and Tape  Comments:  Acticoat 3 to the wound

## 2024-10-07 NOTE — PROGRESS NOTES
Chart reviewed and this RNCM called patient for follow up. Patient says he is doing well and his breathing is good. He denies any CP, chest tightness or SOB. Patient denies any dizziness, lightheadedness or swelling. Patient says he is now weighing himself daily and recording. Today he weighed 220.4 lbs. Patient says he weighed about 225 lbs when he started weighing self daily. We reviewed wt gain parameters and when to call his Cardiologist. Patient is aware to call Cardiology with a wt gain of 3 lbs in 1 day or 5 in 5 days. HF Zone tool and s/s to look for and when to call his cardiologist were also reviewed and he states understanding. Low Na/diabetic diet reviewed and reinforced. Patient states he will not eat whole grain breads/pasta. He says he is compliant with low salt. Patient says he has all of his meds and is taking them as directed. We reviewed patients water pill-Torsemide and the importance of its role in HF. HF education provided.     Patient is agreeable to a f/u call in 2 weeks.    RNCM to follow.  
well-groomed/no distress/well-nourished

## 2024-10-07 NOTE — PATIENT INSTRUCTIONS
Orders Placed This Encounter   Procedures    Wound cleansing and dressings     Left foot wounds:      Hold santyl, do not discard. Store per manufacturers recommendations      Wash your hands with soap and water. Remove old dressing, discard into plastic bag and place in trash. Cleanse the wound with dakins moistened gauze prior to applying a clean dressing. Do not use tissue or cotton balls. Do not scrub the wound. Pat dry using gauze.      Shower no, unless able to keep wounds dry.      Apply lotion to skin surrounding wound   Apply Silver alginate to the open wound.   Cover with abd (gauze to toe wound)   Secure with rolled gauze and tape      Change dressing 3 times weekly      Left toe wound:      Wash your hands with soap and water.  Remove old dressing, discard into plastic bag and place in trash.  Cleanse the wound with dakins moistened gauze prior to applying a clean dressing. Do not use tissue or cotton balls. Do not scrub the wound. Pat dry using gauze.  Shower no, unless able to keep wounds dry       Apply Acticoat 3 to the open wound.    Cover with gauze  Secure with tape  Change dressing 3 times weekly               Off-loading Instructions: Wear off-loading device as directed by your physician (Globo Ped). Put on immediately when rising in the morning and remove when going to bed and Turn every 2 hours. Avoid position directing pressure to Wound site. Limit side lying to 30 degree tilt. Limit the head of bed elevation to 30 degrees. Limit walking and standing to only necessity for activities of daily living.      Wound infection: If you have signs of infection please call the wound center. If the wound center is closedplease go to the Emergency department. Some signs of infection: fever, chills, increased redness, red streaks, increase in pain, increased drainage. Drainage with an odor, Change in drainage color: white/milky/green/tan/yellow, an increase in swelling, chest pain and/or shortness of  breath.     Protein: Eat protein with each meal to promote healing. Examples of protein are fish, meat, chicken, nuts, peanut butter, eggs, lentils, edamame or a protein shake.      Continue with [facility] for wound care, Follow instructions for care, do not use bordered foam     Standing Status:   Future     Standing Expiration Date:   10/14/2024

## 2024-10-07 NOTE — LETTER
[facility] WOUND CARE  [address] [address]  Phone#  [phone]  Fax#  [phone]    Patient:  Thomas Horne  YOB: 1945  Phone:  [phone]  Date of Visit:  10/7/2024    Orders Placed This Encounter   Procedures   • Wound cleansing and dressings     Left foot wounds:      Hold santyl, do not discard. Store per manufacturers recommendations      Wash your hands with soap and water. Remove old dressing, discard into plastic bag and place in trash. Cleanse the wound with dakins moistened gauze prior to applying a clean dressing. Do not use tissue or cotton balls. Do not scrub the wound. Pat dry using gauze.      Shower no, unless able to keep wounds dry.      Apply lotion to skin surrounding wound   Apply Silver alginate to the open wound.   Cover with abd (gauze to toe wound)   Secure with rolled gauze and tape      Change dressing 3 times weekly      Left toe wound:      Wash your hands with soap and water.  Remove old dressing, discard into plastic bag and place in trash.  Cleanse the wound with dakins moistened gauze prior to applying a clean dressing. Do not use tissue or cotton balls. Do not scrub the wound. Pat dry using gauze.  Shower no, unless able to keep wounds dry       Apply Acticoat 3 to the open wound.    Cover with gauze  Secure with tape  Change dressing 3 times weekly               Off-loading Instructions: Wear off-loading device as directed by your physician (Globo Ped). Put on immediately when rising in the morning and remove when going to bed and Turn every 2 hours. Avoid position directing pressure to Wound site. Limit side lying to 30 degree tilt. Limit the head of bed elevation to 30 degrees. Limit walking and standing to only necessity for activities of daily living.      Wound infection: If you have signs of infection please call the wound center. If the wound center is closedplease go to the Emergency department. Some signs of  infection: fever, chills, increased redness, red streaks, increase in pain, increased drainage. Drainage with an odor, Change in drainage color: white/milky/green/tan/yellow, an increase in swelling, chest pain and/or shortness of breath.     Protein: Eat protein with each meal to promote healing. Examples of protein are fish, meat, chicken, nuts, peanut butter, eggs, lentils, edamame or a protein shake.      Continue with [facility] for wound care, Follow instructions for care, do not use bordered foam     Standing Status:   Future     Standing Expiration Date:   10/14/2024   • Wound Procedure Treatment Diabetic Ulcer Anterior;Left Toe D2, second     This order was created via procedure documentation   • Wound Procedure Treatment Diabetic Ulcer Anterior;Left Toe D2, second     This order was created via procedure documentation         Electronically signed by Nitza Sotelo DO

## 2024-10-07 NOTE — PROGRESS NOTES
"Patient ID: Thomas Horne is a 79 y.o. male Date of Birth 1945     Chief Complaint  Chief Complaint   Patient presents with    Follow Up Wound Care Visit     L foot       Allergies  Lisinopril    Assessment:    No problem-specific Assessment & Plan notes found for this encounter.       Diagnoses and all orders for this visit:    Diabetic ulcer of left foot associated with type 2 diabetes mellitus, with fat layer exposed, unspecified part of foot (HCC)  -     lidocaine (LMX) 4 % cream  -     Wound cleansing and dressings; Future  -     Wound Procedure Treatment Diabetic Ulcer Anterior;Left Toe D2, second  -     Wound Procedure Treatment Diabetic Ulcer Anterior;Left Toe D2, second  -     Debridement Diabetic Ulcer Left Heel  -     Debridement Diabetic Ulcer Left;Lateral Plantar  -     Debridement Diabetic Ulcer Anterior;Left Toe D2, second    Peripheral artery disease (HCC)  -     lidocaine (LMX) 4 % cream  -     Wound cleansing and dressings; Future  -     Wound Procedure Treatment Diabetic Ulcer Anterior;Left Toe D2, second  -     Wound Procedure Treatment Diabetic Ulcer Anterior;Left Toe D2, second    Type 2 diabetes mellitus with diabetic peripheral angiopathy without gangrene, with long-term current use of insulin (HCC)  -     lidocaine (LMX) 4 % cream  -     Wound cleansing and dressings; Future  -     Wound Procedure Treatment Diabetic Ulcer Anterior;Left Toe D2, second  -     Wound Procedure Treatment Diabetic Ulcer Anterior;Left Toe D2, second              Debridement   Wound 08/15/24 Diabetic Ulcer Heel Left    Universal Protocol:  procedure performed by consultantConsent: Verbal consent obtained.  Consent given by: patient  Time out: Immediately prior to procedure a \"time out\" was called to verify the correct patient, procedure, equipment, support staff and site/side marked as required.  Timeout called at: 10/7/2024 11:30 AM.  Patient understanding: patient states understanding of the procedure being " "performed  Patient identity confirmed: verbally with patient    Debridement Details  Performed by: physician  Debridement type: surgical  Level of debridement: subcutaneous tissue  Pain control: lidocaine 4%      Post-debridement measurements  Length (cm): 1.8  Width (cm): 3.4  Depth (cm): 0.7  Percent debrided: 100%  Surface Area (cm^2): 6.12  Area Debrided (cm^2): 6.12  Volume (cm^3): 4.28    Tissue and other material debrided: subcutaneous tissue  Devitalized tissue debrided: callus, exudate and slough  Instrument(s) utilized: curette  Bleeding: small  Hemostasis obtained with: pressure  Response to treatment: procedure was tolerated well    Debridement   Wound 08/15/24 Diabetic Ulcer Plantar Left;Lateral    Universal Protocol:  procedure performed by consultantConsent: Verbal consent obtained.  Consent given by: patient  Time out: Immediately prior to procedure a \"time out\" was called to verify the correct patient, procedure, equipment, support staff and site/side marked as required.  Timeout called at: 10/7/2024 11:30 AM.  Patient understanding: patient states understanding of the procedure being performed  Patient identity confirmed: verbally with patient    Debridement Details  Performed by: physician  Debridement type: surgical  Level of debridement: subcutaneous tissue  Pain control: lidocaine 4%      Post-debridement measurements  Length (cm): 0.6  Width (cm): 0.3  Depth (cm): 0.3  Percent debrided: 100%  Surface Area (cm^2): 0.18  Area Debrided (cm^2): 0.18  Volume (cm^3): 0.05    Tissue and other material debrided: subcutaneous tissue  Devitalized tissue debrided: exudate and slough  Instrument(s) utilized: curette  Bleeding: small  Hemostasis obtained with: pressure  Response to treatment: procedure was tolerated well    Debridement   Wound 08/15/24 Diabetic Ulcer Toe D2, second Anterior;Left    Universal Protocol:  procedure performed by consultantConsent: Verbal consent obtained.  Consent given by: " "patient  Time out: Immediately prior to procedure a \"time out\" was called to verify the correct patient, procedure, equipment, support staff and site/side marked as required.  Timeout called at: 10/7/2024 11:30 AM.  Patient understanding: patient states understanding of the procedure being performed  Patient identity confirmed: verbally with patient    Debridement Details  Performed by: physician  Debridement type: surgical  Level of debridement: subcutaneous tissue  Pain control: lidocaine 4%      Post-debridement measurements  Length (cm): 0.4  Width (cm): 0.2  Depth (cm): 0.5  Percent debrided: 100%  Surface Area (cm^2): 0.08  Area Debrided (cm^2): 0.08  Volume (cm^3): 0.04    Tissue and other material debrided: subcutaneous tissue  Devitalized tissue debrided: exudate and slough  Instrument(s) utilized: curette  Bleeding: small  Hemostasis obtained with: pressure  Response to treatment: procedure was tolerated well        Plan:  Wounds are worse.  Toe wound is probing close to bone postdebridement  Wounds debrided as above  Continue wound management with silver alginate on the heel and lateral foot wound but changed to Acticoat tucked into the toe wound.  See wound orders below extensively discussed the importance of proper offloading and recommend that he tweak certain behaviors of his day such as using his shower chair rather than standing on the wounds while in the shower.  Patient was given a urinal today that he can sit for longer periods of time in his recliner chair without having to get up to urinate as many times throughout the day.  I also suggested using a stool if possible while in the kitchen getting himself something to eat.  Tight diabetic control discussed  Discussed the option of HBO therapy, will recommend hyperbaric consult at next week's follow-up visit  Follow-up here in the wound management center in 1 week or call sooner with questions or concerns    Wound 08/15/24 Diabetic Ulcer Heel Left " (Active)   Enter Sumner score: Sumner Grade 3: Deep abscess, OM, or joint sepsis 10/07/24 1117   Wound Image Images linked 10/07/24 1155   Wound Description Pink;Slough;Yellow;Tan 10/07/24 1117   Clara-wound Assessment Dry;Scaly 10/07/24 1117   Wound Length (cm) 1.8 cm 10/07/24 1117   Wound Width (cm) 3.4 cm 10/07/24 1117   Wound Depth (cm) 0.6 cm 10/07/24 1117   Wound Surface Area (cm^2) 6.12 cm^2 10/07/24 1117   Wound Volume (cm^3) 3.672 cm^3 10/07/24 1117   Calculated Wound Volume (cm^3) 3.67 cm^3 10/07/24 1117   Change in Wound Size % -1938.89 10/07/24 1117   Number of underminings 1 10/07/24 1117   Undermining 1 0.2 10/07/24 1117   Undermining 1 is depth extending from 6-9 10/07/24 1117   Drainage Amount Moderate 10/07/24 1117   Drainage Description Serosanguineous;Yellow 10/07/24 1117   Non-staged Wound Description Full thickness 10/07/24 1117   Dressing Status Intact (upon arrival) 10/07/24 1117       Wound 08/15/24 Diabetic Ulcer Plantar Left;Lateral (Active)   Enter Sumner score: Sumner Grade 2: Deep ulcer extended to ligament, tendon, joint capsule, bone, or deep fascia without abscess or osteomyelitis (OM) 10/07/24 1115   Wound Image Images linked 10/07/24 1154   Wound Description Pink;Pale;Yellow 10/07/24 1115   Clara-wound Assessment Dry;Pink;Scaly 10/07/24 1115   Wound Length (cm) 0.6 cm 10/07/24 1115   Wound Width (cm) 0.3 cm 10/07/24 1115   Wound Depth (cm) 0.2 cm 10/07/24 1115   Wound Surface Area (cm^2) 0.18 cm^2 10/07/24 1115   Wound Volume (cm^3) 0.036 cm^3 10/07/24 1115   Calculated Wound Volume (cm^3) 0.04 cm^3 10/07/24 1115   Change in Wound Size % 50 10/07/24 1115   Drainage Amount Small 10/07/24 1115   Drainage Description Serous 10/07/24 1115   Non-staged Wound Description Full thickness 10/07/24 1115   Dressing Status Intact (upon arrival) 10/07/24 1115       Wound 08/15/24 Diabetic Ulcer Toe D2, second Anterior;Left (Active)   Enter Sumner score: Sumner Grade 3: Deep abscess, OM, or joint  sepsis 10/07/24 1112   Wound Image Images linked 10/07/24 1152   Wound Description Pink;Pale 10/07/24 1112   Clara-wound Assessment Scaly;Dry 10/07/24 1112   Wound Length (cm) 0.4 cm 10/07/24 1112   Wound Width (cm) 0.2 cm 10/07/24 1112   Wound Depth (cm) 0.2 cm 10/07/24 1112   Wound Surface Area (cm^2) 0.08 cm^2 10/07/24 1112   Wound Volume (cm^3) 0.016 cm^3 10/07/24 1112   Calculated Wound Volume (cm^3) 0.02 cm^3 10/07/24 1112   Change in Wound Size % 95.24 10/07/24 1112   Undermining 1 0.2 10/07/24 1112   Undermining 1 is depth extending from 12-12 10/07/24 1112   Drainage Amount Scant 10/07/24 1112   Drainage Description Serous 10/07/24 1112   Non-staged Wound Description Full thickness 10/07/24 1112   Dressing Status Intact (upon arrival) 10/07/24 1112       Wound 08/15/24 Diabetic Ulcer Heel Left (Active)   Date First Assessed/Time First Assessed: 08/15/24 0943   Primary Wound Type: Diabetic Ulcer  Location: Heel  Wound Location Orientation: Left       Wound 08/15/24 Diabetic Ulcer Plantar Left;Lateral (Active)   Date First Assessed/Time First Assessed: 08/15/24 0943   Primary Wound Type: Diabetic Ulcer  Location: Plantar  Wound Location Orientation: Left;Lateral       Wound 08/15/24 Diabetic Ulcer Toe D2, second Anterior;Left (Active)   Date First Assessed/Time First Assessed: 08/15/24 0943   Primary Wound Type: Diabetic Ulcer  Location: Toe D2, second  Wound Location Orientation: Anterior;Left       [REMOVED] Wound 12/21/23 Diabetic Ulcer Ankle Posterior;Right (Removed)   Resolved Date: 08/15/24  Date First Assessed/Time First Assessed: 12/21/23 0954   Primary Wound Type: Diabetic Ulcer  Location: Ankle  Wound Location Orientation: Posterior;Right  Wound Description (Comments): MACDONALD 2  Wound Outcome: Unknown (No long...       [REMOVED] Wound 08/07/24 Groin Left (Removed)   Resolved Date: 08/22/24  Date First Assessed/Time First Assessed: 08/07/24 0927   Location: Groin  Wound Location Orientation: Left   Incision's 1st Dressing: ADHESIVE SKIN HIGH VISCOSITY EXOFIN PRECISION PEN (x0)  Wound Outcome: Unknown (No longer pre...       [REMOVED] Wound 08/30/24 Leg Left (Removed)   Resolved Date: 09/30/24  Date First Assessed/Time First Assessed: 08/30/24 1124   Location: Leg  Wound Location Orientation: Left  Wound Description (Comments): UPPER LEG AND LOWER LEG- MEPILEX  Incision's 1st Dressing: DRESSING MEPILEX AG BORDER POST...       [REMOVED] Wound 09/06/24 Pretibial Left;Proximal (Removed)   Resolved Date: 09/30/24  Date First Assessed/Time First Assessed: 09/06/24 1751   Location: Pretibial  Wound Location Orientation: Left;Proximal  Wound Outcome: (c) Other (Comment)       [REMOVED] Wound 09/06/24 Thigh Anterior;Left (Removed)   Resolved Date: 09/16/24  Date First Assessed/Time First Assessed: 09/06/24 1752   Location: Thigh  Wound Location Orientation: Anterior;Left  Wound Outcome: (c) Other (Comment)       [REMOVED] Wound 09/06/24 Ankle Anterior;Left (Removed)   Resolved Date: 09/09/24  Date First Assessed/Time First Assessed: 09/06/24 1804   Location: Ankle  Wound Location Orientation: Anterior;Left  Wound Outcome: Unknown (No longer present)       [REMOVED] Wound 09/06/24 Toe D2, second Anterior;Left (Removed)   Resolved Date: 09/09/24  Date First Assessed/Time First Assessed: 09/06/24 1804   Location: Toe D2, second  Wound Location Orientation: Anterior;Left  Wound Outcome: Unknown (No longer present)       [REMOVED] Wound 09/06/24 Pedal Anterior;Left (Removed)   Resolved Date: 09/09/24  Date First Assessed/Time First Assessed: 09/06/24 1805   Location: Pedal  Wound Location Orientation: Anterior;Left  Wound Outcome: Unknown (No longer present)       Subjective:      .    Patient presents for follow-up of Sumner 3 DFU's of the left heel and left toe and a Sumner 2 DFU of the left lateral plantar foot.  Has been using silver alginate on the lateral foot and heel wounds and using PolyMem Ag on the toe wound.  No  increased pain or drainage.  Patient states that he is using his global ped heel relief shoe.  He states that he stands in the shower every morning with a bag wrapped around so that he does not get the area wet which he feels is pretty effective.  He then walks downstairs to make himself something to eat.  He states that throughout the day he spends most of his time sitting in his recliner chair with his leg elevated.  He states that he does get up about 10 times per day to urinate because he is on a diuretic.  Patient's last A1c was 7.0        The following portions of the patient's history were reviewed and updated as appropriate: He  has a past medical history of Anemia, CAD (coronary artery disease), Callus, Cancer ([facility]), CHF (congestive heart failure) ([facility]), Chronic kidney disease, Clotting disorder ([facility]), Coronary artery disease (1988), Deep vein thrombosis ([facility]), Diabetes mellitus ([facility]), Difficulty walking, Duodenal ulcer, Heart disease (1988), Hyperlipidemia, Hypertension, Myocardial infarction ([facility]), Neuropathy, Neuropathy in diabetes ([facility]), Plantar fasciitis, and Sleep apnea.  He   Patient Active Problem List    Diagnosis Date Noted    Foot ulcer ([facility]) 09/07/2024    SOB (shortness of breath) 09/06/2024    Nausea 09/06/2024    Elevated troponin 09/06/2024    History of DVT (deep vein thrombosis) 08/26/2024    Diabetic ulcer of toe of left foot associated with type 2 diabetes mellitus, limited to breakdown of skin ([facility]) 08/08/2024    Plantar fasciitis, right 07/10/2024    Acute deep vein thrombosis (DVT) of left peroneal vein ([facility]) 05/01/2024    Iron deficiency anemia 04/23/2024    Shortness of breath 04/09/2024    History of colon polyps 02/22/2024    Duodenal ulcer 02/01/2024    Multiple gastric ulcers 12/27/2023    Iron deficiency anemia due to chronic blood loss 12/27/2023    Melena 12/26/2023    Symptomatic anemia 12/26/2023    Frequent PVCs 06/17/2023    Acute on chronic diastolic heart failure ([facility])  06/16/2023    Stage 3b chronic kidney disease (HCC) 06/16/2023    Diabetic ulcer of right midfoot associated with diabetes mellitus due to underlying condition, limited to breakdown of skin ([facility]) 06/15/2023    Hypertensive urgency 05/11/2023    Elevated troponin level not due myocardial infarction 05/11/2023    Stable angina pectoris ([facility]) 03/07/2023    Chronic kidney disease-mineral and bone disorder 11/30/2022    Mild aortic stenosis 11/11/2022    Chronic HFpEF/Moderate pHTN (HFpEF) ([facility]) 11/10/2022    Gross hematuria 07/26/2022    Platelets decreased ([facility]) 07/22/2022    Acute kidney injury superimposed on chronic kidney disease  ([facility]) 02/16/2022    Actinic keratoses 01/14/2022    Type 2 diabetes mellitus with diabetic peripheral angiopathy without gangrene, with long-term current use of insulin ([facility]) 01/11/2022    Varicose veins of left lower extremity 06/25/2021    History of endovascular stent graft for abdominal aortic aneurysm (AAA) 06/25/2021    Bruit (arterial) 06/25/2021    Peripheral artery disease (HCC) 06/25/2021    Type 2 diabetes mellitus with diabetic polyneuropathy, with long-term current use of insulin ([facility]) 06/10/2021    Mixed hyperlipidemia 05/11/2021    Primary hypertension 05/11/2021    Coronary artery disease involving native coronary artery of native heart without angina pectoris 05/11/2021    S/P angioplasty with stent 05/11/2021    Hx of CABG 05/11/2021    S/P AAA repair 05/11/2021    S/P prostatectomy 05/11/2021    H/O prostate cancer 05/11/2021     He  reports that he quit smoking about 36 years ago. His smoking use included cigarettes. He started smoking about 68 years ago. He has a 49.5 pack-year smoking history. He has been exposed to tobacco smoke. He has never used smokeless tobacco. He reports that he does not currently use alcohol after a past usage of about 5.0 standard drinks of alcohol per week. He reports that he does not use drugs.  He is allergic to lisinopril..    Review of  Systems   Constitutional:  Negative for chills and fever.   HENT:  Negative for congestion and sneezing.    Respiratory:  Negative for cough.    Musculoskeletal:  Positive for gait problem.   Skin:  Positive for wound.   Psychiatric/Behavioral:  Negative for agitation.          Objective:       Wound 08/15/24 Diabetic Ulcer Heel Left (Active)   Enter Sumner score: Sumner Grade 3: Deep abscess, OM, or joint sepsis 10/07/24 1117   Wound Image Images linked 10/07/24 1155   Wound Description Pink;Slough;Yellow;Tan 10/07/24 1117   Clara-wound Assessment Dry;Scaly 10/07/24 1117   Wound Length (cm) 1.8 cm 10/07/24 1117   Wound Width (cm) 3.4 cm 10/07/24 1117   Wound Depth (cm) 0.6 cm 10/07/24 1117   Wound Surface Area (cm^2) 6.12 cm^2 10/07/24 1117   Wound Volume (cm^3) 3.672 cm^3 10/07/24 1117   Calculated Wound Volume (cm^3) 3.67 cm^3 10/07/24 1117   Change in Wound Size % -1938.89 10/07/24 1117   Number of underminings 1 10/07/24 1117   Undermining 1 0.2 10/07/24 1117   Undermining 1 is depth extending from 6-9 10/07/24 1117   Drainage Amount Moderate 10/07/24 1117   Drainage Description Serosanguineous;Yellow 10/07/24 1117   Non-staged Wound Description Full thickness 10/07/24 1117   Dressing Status Intact (upon arrival) 10/07/24 1117       Wound 08/15/24 Diabetic Ulcer Plantar Left;Lateral (Active)   Enter Sumner score: Sumner Grade 2: Deep ulcer extended to ligament, tendon, joint capsule, bone, or deep fascia without abscess or osteomyelitis (OM) 10/07/24 1115   Wound Image Images linked 10/07/24 1154   Wound Description Pink;Pale;Yellow 10/07/24 1115   Clara-wound Assessment Dry;Pink;Scaly 10/07/24 1115   Wound Length (cm) 0.6 cm 10/07/24 1115   Wound Width (cm) 0.3 cm 10/07/24 1115   Wound Depth (cm) 0.2 cm 10/07/24 1115   Wound Surface Area (cm^2) 0.18 cm^2 10/07/24 1115   Wound Volume (cm^3) 0.036 cm^3 10/07/24 1115   Calculated Wound Volume (cm^3) 0.04 cm^3 10/07/24 1115   Change in Wound Size % 50 10/07/24 1115    Drainage Amount Small 10/07/24 1115   Drainage Description Serous 10/07/24 1115   Non-staged Wound Description Full thickness 10/07/24 1115   Dressing Status Intact (upon arrival) 10/07/24 1115       Wound 08/15/24 Diabetic Ulcer Toe D2, second Anterior;Left (Active)   Enter Sumner score: Sumner Grade 3: Deep abscess, OM, or joint sepsis 10/07/24 1112   Wound Image Images linked 10/07/24 1152   Wound Description Pink;Pale 10/07/24 1112   Clara-wound Assessment Scaly;Dry 10/07/24 1112   Wound Length (cm) 0.4 cm 10/07/24 1112   Wound Width (cm) 0.2 cm 10/07/24 1112   Wound Depth (cm) 0.2 cm 10/07/24 1112   Wound Surface Area (cm^2) 0.08 cm^2 10/07/24 1112   Wound Volume (cm^3) 0.016 cm^3 10/07/24 1112   Calculated Wound Volume (cm^3) 0.02 cm^3 10/07/24 1112   Change in Wound Size % 95.24 10/07/24 1112   Undermining 1 0.2 10/07/24 1112   Undermining 1 is depth extending from 12-12 10/07/24 1112   Drainage Amount Scant 10/07/24 1112   Drainage Description Serous 10/07/24 1112   Non-staged Wound Description Full thickness 10/07/24 1112   Dressing Status Intact (upon arrival) 10/07/24 1112       /74   Pulse 64   Temp (!) 97.4 °F (36.3 °C)   Resp 16     Physical Exam        Wound 08/15/24 Diabetic Ulcer Heel Left (Active)   Enter Sumner score: Sumner Grade 3: Deep abscess, OM, or joint sepsis 10/07/24 1117   Wound Image   10/07/24 1155   Wound Description Pink;Slough;Yellow;Tan 10/07/24 1117   Clara-wound Assessment Dry;Scaly 10/07/24 1117   Wound Length (cm) 1.8 cm 10/07/24 1117   Wound Width (cm) 3.4 cm 10/07/24 1117   Wound Depth (cm) 0.6 cm 10/07/24 1117   Wound Surface Area (cm^2) 6.12 cm^2 10/07/24 1117   Wound Volume (cm^3) 3.672 cm^3 10/07/24 1117   Calculated Wound Volume (cm^3) 3.67 cm^3 10/07/24 1117   Change in Wound Size % -1938.89 10/07/24 1117   Number of underminings 1 10/07/24 1117   Undermining 1 0.2 10/07/24 1117   Undermining 1 is depth extending from 6-9 10/07/24 1117   Drainage Amount  Moderate 10/07/24 1117   Drainage Description Serosanguineous;Yellow 10/07/24 1117   Non-staged Wound Description Full thickness 10/07/24 1117   Dressing Status Intact 10/07/24 1117       Wound 08/15/24 Diabetic Ulcer Plantar Left;Lateral (Active)   Enter Sumner score: Sumner Grade 2: Deep ulcer extended to ligament, tendon, joint capsule, bone, or deep fascia without abscess or osteomyelitis (OM) 10/07/24 1115   Wound Image   10/07/24 1154   Wound Description Pink;Pale;Yellow 10/07/24 1115   Clara-wound Assessment Dry;Pink;Scaly 10/07/24 1115   Wound Length (cm) 0.6 cm 10/07/24 1115   Wound Width (cm) 0.3 cm 10/07/24 1115   Wound Depth (cm) 0.2 cm 10/07/24 1115   Wound Surface Area (cm^2) 0.18 cm^2 10/07/24 1115   Wound Volume (cm^3) 0.036 cm^3 10/07/24 1115   Calculated Wound Volume (cm^3) 0.04 cm^3 10/07/24 1115   Change in Wound Size % 50 10/07/24 1115   Drainage Amount Small 10/07/24 1115   Drainage Description Serous 10/07/24 1115   Non-staged Wound Description Full thickness 10/07/24 1115   Dressing Status Intact 10/07/24 1115       Wound 08/15/24 Diabetic Ulcer Toe D2, second Anterior;Left (Active)   Enter Sumner score: Sumner Grade 3: Deep abscess, OM, or joint sepsis 10/07/24 1112   Wound Image   10/07/24 1152   Wound Description Pink;Pale 10/07/24 1112   Clara-wound Assessment Scaly;Dry 10/07/24 1112   Wound Length (cm) 0.4 cm 10/07/24 1112   Wound Width (cm) 0.2 cm 10/07/24 1112   Wound Depth (cm) 0.2 cm 10/07/24 1112   Wound Surface Area (cm^2) 0.08 cm^2 10/07/24 1112   Wound Volume (cm^3) 0.016 cm^3 10/07/24 1112   Calculated Wound Volume (cm^3) 0.02 cm^3 10/07/24 1112   Change in Wound Size % 95.24 10/07/24 1112   Number of underminings 1 09/30/24 0906   Undermining 1 0.2 10/07/24 1112   Undermining 1 is depth extending from 12-12 10/07/24 1112   Drainage Amount Scant 10/07/24 1112   Drainage Description Serous 10/07/24 1112   Non-staged Wound Description Full thickness 10/07/24 1112   Dressing Status  Intact 10/07/24 1112                       Wound Instructions:  Orders Placed This Encounter   Procedures    Wound cleansing and dressings     Left foot wounds:      Hold santyl, do not discard. Store per manufacturers recommendations      Wash your hands with soap and water. Remove old dressing, discard into plastic bag and place in trash. Cleanse the wound with dakins moistened gauze prior to applying a clean dressing. Do not use tissue or cotton balls. Do not scrub the wound. Pat dry using gauze.      Shower no, unless able to keep wounds dry.      Apply lotion to skin surrounding wound   Apply Silver alginate to the open wound.   Cover with abd (gauze to toe wound)   Secure with rolled gauze and tape      Change dressing 3 times weekly      Left toe wound:      Wash your hands with soap and water.  Remove old dressing, discard into plastic bag and place in trash.  Cleanse the wound with dakins moistened gauze prior to applying a clean dressing. Do not use tissue or cotton balls. Do not scrub the wound. Pat dry using gauze.  Shower no, unless able to keep wounds dry       Apply Acticoat 3 to the open wound.    Cover with gauze  Secure with tape  Change dressing 3 times weekly               Off-loading Instructions: Wear off-loading device as directed by your physician (Globo Ped). Put on immediately when rising in the morning and remove when going to bed and Turn every 2 hours. Avoid position directing pressure to Wound site. Limit side lying to 30 degree tilt. Limit the head of bed elevation to 30 degrees. Limit walking and standing to only necessity for activities of daily living.      Wound infection: If you have signs of infection please call the wound center. If the wound center is closedplease go to the Emergency department. Some signs of infection: fever, chills, increased redness, red streaks, increase in pain, increased drainage. Drainage with an odor, Change in drainage color:  white/milky/green/tan/yellow, an increase in swelling, chest pain and/or shortness of breath.     Protein: Eat protein with each meal to promote healing. Examples of protein are fish, meat, chicken, nuts, peanut butter, eggs, lentils, edamame or a protein shake.      Continue with [facility] for wound care, Follow instructions for care, do not use bordered foam     Standing Status:   Future     Standing Expiration Date:   10/14/2024    Wound Procedure Treatment Diabetic Ulcer Anterior;Left Toe D2, second     This order was created via procedure documentation    Wound Procedure Treatment Diabetic Ulcer Anterior;Left Toe D2, second     This order was created via procedure documentation    Debridement Diabetic Ulcer Left Heel     This order was created via procedure documentation    Debridement Diabetic Ulcer Left;Lateral Plantar     This order was created via procedure documentation    Debridement Diabetic Ulcer Anterior;Left Toe D2, second     This order was created via procedure documentation        Diagnosis ICD-10-CM Associated Orders   1. Diabetic ulcer of left foot associated with type 2 diabetes mellitus, with fat layer exposed, unspecified part of foot ([facility])  E11.621 lidocaine (LMX) 4 % cream    L97.522 Wound cleansing and dressings     Wound Procedure Treatment Diabetic Ulcer Anterior;Left Toe D2, second     Wound Procedure Treatment Diabetic Ulcer Anterior;Left Toe D2, second     Debridement Diabetic Ulcer Left Heel     Debridement Diabetic Ulcer Left;Lateral Plantar     Debridement Diabetic Ulcer Anterior;Left Toe D2, second      2. Peripheral artery disease ([facility])  I73.9 lidocaine (LMX) 4 % cream     Wound cleansing and dressings     Wound Procedure Treatment Diabetic Ulcer Anterior;Left Toe D2, second     Wound Procedure Treatment Diabetic Ulcer Anterior;Left Toe D2, second      3. Type 2 diabetes mellitus with diabetic peripheral angiopathy without gangrene, with long-term current use of insulin  ([facility])  E11.51 lidocaine (LMX) 4 % cream    Z79.4 Wound cleansing and dressings     Wound Procedure Treatment Diabetic Ulcer Anterior;Left Toe D2, second     Wound Procedure Treatment Diabetic Ulcer Anterior;Left Toe D2, second

## 2024-10-08 ENCOUNTER — APPOINTMENT (OUTPATIENT)
Dept: PHYSICAL THERAPY | Facility: CLINIC | Age: 79
End: 2024-10-08
Payer: COMMERCIAL

## 2024-10-10 ENCOUNTER — RA CDI HCC (OUTPATIENT)
Dept: OTHER | Facility: HOSPITAL | Age: 79
End: 2024-10-10

## 2024-10-10 ENCOUNTER — APPOINTMENT (OUTPATIENT)
Dept: PHYSICAL THERAPY | Facility: CLINIC | Age: 79
End: 2024-10-10
Payer: COMMERCIAL

## 2024-10-10 DIAGNOSIS — L97.411 DIABETIC ULCER OF RIGHT MIDFOOT ASSOCIATED WITH DIABETES MELLITUS DUE TO UNDERLYING CONDITION, LIMITED TO BREAKDOWN OF SKIN (HCC): ICD-10-CM

## 2024-10-10 DIAGNOSIS — E08.621 DIABETIC ULCER OF RIGHT MIDFOOT ASSOCIATED WITH DIABETES MELLITUS DUE TO UNDERLYING CONDITION, LIMITED TO BREAKDOWN OF SKIN (HCC): ICD-10-CM

## 2024-10-10 RX ORDER — PREGABALIN 150 MG/1
150 CAPSULE ORAL 3 TIMES DAILY
Qty: 270 CAPSULE | Refills: 0 | Status: SHIPPED | OUTPATIENT
Start: 2024-10-10 | End: 2025-01-08

## 2024-10-10 NOTE — TELEPHONE ENCOUNTER
Reason for call:   [x] Refill   [] Prior Auth  [x] Other: Pt requests a 90 days supply     Office:   [] PCP/Provider -   [x] Specialty/Provider - [facility]     Medication: pregabalin (LYRICA) 150 mg Take 1 capsule (150 mg total) by mouth 3 (three) times a day       Pharmacy: [facility]     Does the patient have enough for 3 days?   [x] Yes   [] No - Send as HP to POD

## 2024-10-14 ENCOUNTER — APPOINTMENT (OUTPATIENT)
Dept: LAB | Facility: CLINIC | Age: 79
End: 2024-10-14
Payer: COMMERCIAL

## 2024-10-14 DIAGNOSIS — I25.10 CORONARY ARTERY DISEASE INVOLVING NATIVE CORONARY ARTERY OF NATIVE HEART WITHOUT ANGINA PECTORIS: ICD-10-CM

## 2024-10-14 DIAGNOSIS — I10 PRIMARY HYPERTENSION: ICD-10-CM

## 2024-10-14 DIAGNOSIS — E11.51 TYPE 2 DIABETES MELLITUS WITH DIABETIC PERIPHERAL ANGIOPATHY WITHOUT GANGRENE, WITH LONG-TERM CURRENT USE OF INSULIN (HCC): ICD-10-CM

## 2024-10-14 DIAGNOSIS — Z79.4 TYPE 2 DIABETES MELLITUS WITH DIABETIC PERIPHERAL ANGIOPATHY WITHOUT GANGRENE, WITH LONG-TERM CURRENT USE OF INSULIN (HCC): ICD-10-CM

## 2024-10-14 DIAGNOSIS — I50.33 ACUTE ON CHRONIC DIASTOLIC HEART FAILURE (HCC): ICD-10-CM

## 2024-10-14 LAB
ALBUMIN SERPL BCG-MCNC: 4.1 G/DL (ref 3.5–5)
ALP SERPL-CCNC: 40 U/L (ref 34–104)
ALT SERPL W P-5'-P-CCNC: 21 U/L (ref 7–52)
ANION GAP SERPL CALCULATED.3IONS-SCNC: 6 MMOL/L (ref 4–13)
AST SERPL W P-5'-P-CCNC: 22 U/L (ref 13–39)
BILIRUB SERPL-MCNC: 0.58 MG/DL (ref 0.2–1)
BUN SERPL-MCNC: 36 MG/DL (ref 5–25)
CALCIUM SERPL-MCNC: 9.4 MG/DL (ref 8.4–10.2)
CHLORIDE SERPL-SCNC: 105 MMOL/L (ref 96–108)
CHOLEST SERPL-MCNC: 175 MG/DL
CO2 SERPL-SCNC: 31 MMOL/L (ref 21–32)
CREAT SERPL-MCNC: 1.51 MG/DL (ref 0.6–1.3)
CREAT UR-MCNC: 81 MG/DL
ERYTHROCYTE [DISTWIDTH] IN BLOOD BY AUTOMATED COUNT: 16.4 % (ref 11.6–15.1)
EST. AVERAGE GLUCOSE BLD GHB EST-MCNC: 131 MG/DL
GFR SERPL CREATININE-BSD FRML MDRD: 42 ML/MIN/1.73SQ M
GLUCOSE P FAST SERPL-MCNC: 143 MG/DL (ref 65–99)
HBA1C MFR BLD: 6.2 %
HCT VFR BLD AUTO: 44.7 % (ref 36.5–49.3)
HDLC SERPL-MCNC: 61 MG/DL
HGB BLD-MCNC: 13.7 G/DL (ref 12–17)
LDLC SERPL CALC-MCNC: 83 MG/DL (ref 0–100)
MCH RBC QN AUTO: 27.7 PG (ref 26.8–34.3)
MCHC RBC AUTO-ENTMCNC: 30.6 G/DL (ref 31.4–37.4)
MCV RBC AUTO: 91 FL (ref 82–98)
MICROALBUMIN UR-MCNC: 10.9 MG/L
MICROALBUMIN/CREAT 24H UR: 13 MG/G CREATININE (ref 0–30)
PLATELET # BLD AUTO: 225 THOUSANDS/UL (ref 149–390)
PMV BLD AUTO: 12.3 FL (ref 8.9–12.7)
POTASSIUM SERPL-SCNC: 4.2 MMOL/L (ref 3.5–5.3)
PROT SERPL-MCNC: 7.6 G/DL (ref 6.4–8.4)
RBC # BLD AUTO: 4.94 MILLION/UL (ref 3.88–5.62)
SODIUM SERPL-SCNC: 142 MMOL/L (ref 135–147)
TRIGL SERPL-MCNC: 154 MG/DL
WBC # BLD AUTO: 7.26 THOUSAND/UL (ref 4.31–10.16)

## 2024-10-14 PROCEDURE — 80061 LIPID PANEL: CPT

## 2024-10-14 PROCEDURE — 80053 COMPREHEN METABOLIC PANEL: CPT

## 2024-10-14 PROCEDURE — 82570 ASSAY OF URINE CREATININE: CPT

## 2024-10-14 PROCEDURE — 82043 UR ALBUMIN QUANTITATIVE: CPT

## 2024-10-14 PROCEDURE — 83036 HEMOGLOBIN GLYCOSYLATED A1C: CPT

## 2024-10-14 PROCEDURE — 85027 COMPLETE CBC AUTOMATED: CPT

## 2024-10-14 PROCEDURE — 36415 COLL VENOUS BLD VENIPUNCTURE: CPT

## 2024-10-15 ENCOUNTER — HOSPITAL ENCOUNTER (OUTPATIENT)
Dept: RADIOLOGY | Facility: HOSPITAL | Age: 79
Discharge: HOME | End: 2024-10-15
Payer: COMMERCIAL

## 2024-10-15 ENCOUNTER — DOCUMENTATION (OUTPATIENT)
Dept: WOUND CARE | Facility: HOSPITAL | Age: 79
End: 2024-10-15

## 2024-10-15 ENCOUNTER — OFFICE VISIT (OUTPATIENT)
Dept: WOUND CARE | Facility: HOSPITAL | Age: 79
End: 2024-10-15
Payer: COMMERCIAL

## 2024-10-15 VITALS
TEMPERATURE: 97.6 F | RESPIRATION RATE: 15 BRPM | SYSTOLIC BLOOD PRESSURE: 138 MMHG | HEART RATE: 67 BPM | DIASTOLIC BLOOD PRESSURE: 72 MMHG

## 2024-10-15 DIAGNOSIS — I73.9 PERIPHERAL ARTERY DISEASE (HCC): ICD-10-CM

## 2024-10-15 DIAGNOSIS — I12.9 BENIGN HYPERTENSION WITH CKD (CHRONIC KIDNEY DISEASE) STAGE III (HCC): ICD-10-CM

## 2024-10-15 DIAGNOSIS — L97.525 DIABETIC ULCER OF LEFT FOOT ASSOCIATED WITH TYPE 2 DIABETES MELLITUS, WITH MUSCLE INVOLVEMENT WITHOUT EVIDENCE OF NECROSIS, UNSPECIFIED PART OF FOOT (HCC): Primary | ICD-10-CM

## 2024-10-15 DIAGNOSIS — N18.30 BENIGN HYPERTENSION WITH CKD (CHRONIC KIDNEY DISEASE) STAGE III (HCC): ICD-10-CM

## 2024-10-15 DIAGNOSIS — E11.621 DIABETIC ULCER OF LEFT FOOT ASSOCIATED WITH TYPE 2 DIABETES MELLITUS, WITH MUSCLE INVOLVEMENT WITHOUT EVIDENCE OF NECROSIS, UNSPECIFIED PART OF FOOT (HCC): Primary | ICD-10-CM

## 2024-10-15 DIAGNOSIS — L97.525 DIABETIC ULCER OF LEFT FOOT ASSOCIATED WITH TYPE 2 DIABETES MELLITUS, WITH MUSCLE INVOLVEMENT WITHOUT EVIDENCE OF NECROSIS, UNSPECIFIED PART OF FOOT (HCC): ICD-10-CM

## 2024-10-15 DIAGNOSIS — Z79.4 TYPE 2 DIABETES MELLITUS WITH DIABETIC PERIPHERAL ANGIOPATHY WITHOUT GANGRENE, WITH LONG-TERM CURRENT USE OF INSULIN (HCC): ICD-10-CM

## 2024-10-15 DIAGNOSIS — E11.51 TYPE 2 DIABETES MELLITUS WITH DIABETIC PERIPHERAL ANGIOPATHY WITHOUT GANGRENE, WITH LONG-TERM CURRENT USE OF INSULIN (HCC): ICD-10-CM

## 2024-10-15 DIAGNOSIS — E11.621 DIABETIC ULCER OF LEFT FOOT ASSOCIATED WITH TYPE 2 DIABETES MELLITUS, WITH MUSCLE INVOLVEMENT WITHOUT EVIDENCE OF NECROSIS, UNSPECIFIED PART OF FOOT (HCC): ICD-10-CM

## 2024-10-15 PROCEDURE — 99214 OFFICE O/P EST MOD 30 MIN: CPT | Performed by: FAMILY MEDICINE

## 2024-10-15 PROCEDURE — 11042 DBRDMT SUBQ TIS 1ST 20SQCM/<: CPT | Performed by: FAMILY MEDICINE

## 2024-10-15 PROCEDURE — 71046 X-RAY EXAM CHEST 2 VIEWS: CPT

## 2024-10-15 RX ORDER — SODIUM HYPOCHLORITE 2.5 MG/ML
1 SOLUTION TOPICAL EVERY OTHER DAY
Qty: 473 ML | Refills: 2 | Status: SHIPPED | OUTPATIENT
Start: 2024-10-15

## 2024-10-15 RX ORDER — LIDOCAINE 40 MG/G
CREAM TOPICAL ONCE
Status: COMPLETED | OUTPATIENT
Start: 2024-10-15 | End: 2024-10-15

## 2024-10-15 RX ADMIN — LIDOCAINE: 40 CREAM TOPICAL at 08:31

## 2024-10-15 NOTE — PROGRESS NOTES
Wound Procedure Treatment    Performed by: Graciela Philip RN  Authorized by: Nitza Sotelo DO    Associated wounds:   Wound 08/15/24 Diabetic Ulcer Heel Left  Wound 08/15/24 Diabetic Ulcer Plantar Left;Lateral  Wound cleansed with:  NSS and Dakin's 0.25%  Applied to periwound:  Moisture lotion  Applied primary dressing:  Calcium alginate and Silver  Applied secondary dressing:  ABD and Gauze  Dressing secured with:  Ender  Wound Procedure Treatment Diabetic Ulcer Anterior;Left Toe D2, second    Performed by: Graciela Philip RN  Authorized by: Nitza Sotelo DO    Associated wounds:   Wound 08/15/24 Diabetic Ulcer Toe D2, second Anterior;Left  Wound cleansed with:  Dakin's 0.25% and NSS  Applied to periwound:  Moisture lotion  Applied primary dressing:  Acticoat  Applied secondary dressing:  Gauze  Dressing secured with:  Ender and Tape  Offloading device appllied:  Heel relief shoe  Comments:  Acticoat 3

## 2024-10-15 NOTE — PROGRESS NOTES
"Patient ID: Thomas Horne is a 79 y.o. male Date of Birth 1945     Chief Complaint  Chief Complaint   Patient presents with    Follow Up Wound Care Visit     Left foot       Allergies  Lisinopril    Assessment:    No problem-specific Assessment & Plan notes found for this encounter.       Diagnoses and all orders for this visit:    Diabetic ulcer of left foot associated with type 2 diabetes mellitus, with muscle involvement without evidence of necrosis, unspecified part of foot (HCC)  -     lidocaine (LMX) 4 % cream  -     Cancel: Wound cleansing and dressings; Future  -     Wound cleansing and dressings; Future  -     XR chest pa and lateral; Future  -     sodium hypochlorite (DAKIN'S HALF-STRENGTH) external solution; Apply 1 Application topically every other day At each dressing change    Peripheral artery disease ([facility])  -     lidocaine (LMX) 4 % cream  -     Cancel: Wound cleansing and dressings; Future  -     Wound cleansing and dressings; Future    Type 2 diabetes mellitus with diabetic peripheral angiopathy without gangrene, with long-term current use of insulin ([facility])  -     lidocaine (LMX) 4 % cream  -     Cancel: Wound cleansing and dressings; Future  -     Wound cleansing and dressings; Future    Other orders  -     Wound Procedure Treatment  -     Wound Procedure Treatment Diabetic Ulcer Anterior;Left Toe D2, second  -     Debridement  -     Debridement  -     Debridement              Debridement   Wound 08/15/24 Diabetic Ulcer Heel Left    Universal Protocol:  procedure performed by consultantConsent: Verbal consent obtained.  Consent given by: patient  Time out: Immediately prior to procedure a \"time out\" was called to verify the correct patient, procedure, equipment, support staff and site/side marked as required.  Timeout called at: 10/15/2024 8:30 AM.  Patient understanding: patient states understanding of the procedure being performed  Patient identity confirmed: verbally with " "patient    Debridement Details  Performed by: physician  Debridement type: surgical  Level of debridement: subcutaneous tissue  Pain control: lidocaine 4%      Post-debridement measurements  Length (cm): 1.9  Width (cm): 3.9  Depth (cm): 0.7  Percent debrided: 100%  Surface Area (cm^2): 7.41  Area Debrided (cm^2): 7.41  Volume (cm^3): 5.19    Tissue and other material debrided: subcutaneous tissue  Devitalized tissue debrided: exudate and slough  Instrument(s) utilized: curette  Bleeding: small  Hemostasis obtained with: pressure  Response to treatment: procedure was tolerated well    Debridement   Wound 08/15/24 Diabetic Ulcer Plantar Left;Lateral    Universal Protocol:  procedure performed by consultantConsent: Verbal consent obtained.  Consent given by: patient  Time out: Immediately prior to procedure a \"time out\" was called to verify the correct patient, procedure, equipment, support staff and site/side marked as required.  Timeout called at: 10/15/2024 8:30 AM.  Patient understanding: patient states understanding of the procedure being performed  Patient identity confirmed: verbally with patient    Debridement Details  Performed by: physician  Debridement type: surgical  Level of debridement: subcutaneous tissue  Pain control: lidocaine 4%      Post-debridement measurements  Length (cm): 0.3  Width (cm): 0.4  Depth (cm): 0.5  Percent debrided: 100%  Surface Area (cm^2): 0.12  Area Debrided (cm^2): 0.12  Volume (cm^3): 0.06    Tissue and other material debrided: subcutaneous tissue  Devitalized tissue debrided: exudate and slough  Instrument(s) utilized: curette  Bleeding: small  Hemostasis obtained with: pressure  Response to treatment: procedure was tolerated well    Debridement   Wound 08/15/24 Diabetic Ulcer Toe D2, second Anterior;Left    Universal Protocol:  procedure performed by consultantConsent: Verbal consent obtained.  Consent given by: patient  Time out: Immediately prior to procedure a \"time out\" " was called to verify the correct patient, procedure, equipment, support staff and site/side marked as required.  Timeout called at: 10/15/2024 8:30 AM.  Patient understanding: patient states understanding of the procedure being performed  Patient identity confirmed: verbally with patient    Debridement Details  Performed by: physician  Debridement type: surgical  Level of debridement: subcutaneous tissue  Pain control: lidocaine 4%      Post-debridement measurements  Length (cm): 1.4  Width (cm): 0.7  Depth (cm): 0.5  Percent debrided: 100%  Surface Area (cm^2): 0.98  Area Debrided (cm^2): 0.98  Volume (cm^3): 0.49    Tissue and other material debrided: subcutaneous tissue  Devitalized tissue debrided: exudate and slough  Instrument(s) utilized: curette  Bleeding: small  Hemostasis obtained with: pressure  Response to treatment: procedure was tolerated well        Plan:  Wounds are unchanged  Wounds debrided as above  Continue wound management as below  Again discussed the importance of proper offloading.  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  Patient had a wound culture and was treated with a course of antibiotics in August.  He continues to offload the best that he can.  He is diabetic control has been maximized, last A1c was 6.2.  Patient had recent arterial intervention and therefore flow has been maximized.  Nutrition has also been maximized with increased protein intake and decreasing carbohydrate intake.  Patient does have a significant cardiac history but he appears stable and controlled.  Recent echo and EKG show sufficient EF of 50% and EKG appears stable.  Patient with no recent cardiac symptoms.  Therefore EKG will not be repeated at this time.  Chest x-ray ordered today.  Patient has some issues with claustrophobia.  Will recommend use of antianxiety medication at least for the initial few dives  to see how he does.  Left TM not visualized secondary to cerumen impaction.  Patient already has an appointment scheduled with his PCP in 2 days and will have the ear flushed at that time.  I recommend he use Debrox drops which he already has at home until that appointment.  Patient is at high risk for amputation secondary to these nonhealing diabetic foot ulcers and recommend HBO as an adjunct of treatment and help decrease the risk of limb loss.  Patient will continue to follow weekly with wound care visits for continued monitoring of the progression of these wounds.    Wound 08/15/24 Diabetic Ulcer Heel Left (Active)   Enter Sumner score: Sumner Grade 3: Deep abscess, OM, or joint sepsis 10/15/24 0827   Wound Image Images linked 10/15/24 0918   Wound Description Pink;White;Yellow 10/15/24 0827   Clara-wound Assessment Callus;Maceration 10/15/24 0827   Wound Length (cm) 1.9 cm 10/15/24 0827   Wound Width (cm) 3.9 cm 10/15/24 0827   Wound Depth (cm) 0.6 cm 10/15/24 0827   Wound Surface Area (cm^2) 7.41 cm^2 10/15/24 0827   Wound Volume (cm^3) 4.446 cm^3 10/15/24 0827   Calculated Wound Volume (cm^3) 4.45 cm^3 10/15/24 0827   Change in Wound Size % -2372.22 10/15/24 0827   Undermining 1 0.4 10/15/24 0827   Undermining 1 is depth extending from 7-12 o'clock; deepest around 11 10/15/24 0827   Drainage Amount Large 10/15/24 0827   Drainage Description Yellow;Foul smelling;Brown 10/15/24 0827   Non-staged Wound Description Full thickness 10/15/24 0827   Dressing Status Intact (upon arrival) 10/15/24 0827       Wound 08/15/24 Diabetic Ulcer Plantar Left;Lateral (Active)   Wound Image Images linked 10/15/24 0918   Wound Description Pink;White;Pale 10/15/24 0825   Clara-wound Assessment Callus 10/15/24 0825   Wound Length (cm) 0.3 cm 10/15/24 0825   Wound Width (cm) 0.4 cm 10/15/24 0825   Wound Depth (cm) 0.3 cm 10/15/24 0825   Wound Surface Area (cm^2) 0.12 cm^2 10/15/24 0825   Wound Volume (cm^3) 0.036 cm^3 10/15/24 0825    Calculated Wound Volume (cm^3) 0.04 cm^3 10/15/24 0825   Change in Wound Size % 50 10/15/24 0825   Drainage Amount Small 10/15/24 0825   Drainage Description Serous 10/15/24 0825   Non-staged Wound Description Full thickness 10/15/24 0825   Dressing Status Intact (upon arrival) 10/15/24 0825       Wound 08/15/24 Diabetic Ulcer Toe D2, second Anterior;Left (Active)   Wound Image Images linked 10/15/24 0917   Wound Description Pink;Brown;Yellow 10/15/24 0829   Clara-wound Assessment Dry;Pink 10/15/24 0829   Wound Length (cm) 1.4 cm 10/15/24 0829   Wound Width (cm) 0.7 cm 10/15/24 0829   Wound Depth (cm) 0.1 cm 10/15/24 0829   Wound Surface Area (cm^2) 0.98 cm^2 10/15/24 0829   Wound Volume (cm^3) 0.098 cm^3 10/15/24 0829   Calculated Wound Volume (cm^3) 0.1 cm^3 10/15/24 0829   Change in Wound Size % 76.19 10/15/24 0829   Undermining 1 0 10/15/24 0829   Drainage Amount Scant 10/15/24 0829   Drainage Description Yellow 10/15/24 0829   Non-staged Wound Description Full thickness 10/15/24 0829   Dressing Status Intact (upon arrival) 10/15/24 0829       Wound 08/15/24 Diabetic Ulcer Heel Left (Active)   Date First Assessed/Time First Assessed: 08/15/24 0943   Primary Wound Type: Diabetic Ulcer  Location: Heel  Wound Location Orientation: Left       Wound 08/15/24 Diabetic Ulcer Plantar Left;Lateral (Active)   Date First Assessed/Time First Assessed: 08/15/24 0943   Primary Wound Type: Diabetic Ulcer  Location: Plantar  Wound Location Orientation: Left;Lateral       Wound 08/15/24 Diabetic Ulcer Toe D2, second Anterior;Left (Active)   Date First Assessed/Time First Assessed: 08/15/24 0943   Primary Wound Type: Diabetic Ulcer  Location: Toe D2, second  Wound Location Orientation: Anterior;Left       [REMOVED] Wound 12/21/23 Diabetic Ulcer Ankle Posterior;Right (Removed)   Resolved Date: 08/15/24  Date First Assessed/Time First Assessed: 12/21/23 0954   Primary Wound Type: Diabetic Ulcer  Location: Ankle  Wound Location  Orientation: Posterior;Right  Wound Description (Comments): MACDONALD 2  Wound Outcome: Unknown (No long...       [REMOVED] Wound 08/07/24 Groin Left (Removed)   Resolved Date: 08/22/24  Date First Assessed/Time First Assessed: 08/07/24 0927   Location: Groin  Wound Location Orientation: Left  Incision's 1st Dressing: ADHESIVE SKIN HIGH VISCOSITY EXOFIN PRECISION PEN (x0)  Wound Outcome: Unknown (No longer pre...       [REMOVED] Wound 08/30/24 Leg Left (Removed)   Resolved Date: 09/30/24  Date First Assessed/Time First Assessed: 08/30/24 1124   Location: Leg  Wound Location Orientation: Left  Wound Description (Comments): UPPER LEG AND LOWER LEG- MEPILEX  Incision's 1st Dressing: DRESSING MEPILEX AG BORDER POST...       [REMOVED] Wound 09/06/24 Pretibial Left;Proximal (Removed)   Resolved Date: 09/30/24  Date First Assessed/Time First Assessed: 09/06/24 1751   Location: Pretibial  Wound Location Orientation: Left;Proximal  Wound Outcome: (c) Other (Comment)       [REMOVED] Wound 09/06/24 Thigh Anterior;Left (Removed)   Resolved Date: 09/16/24  Date First Assessed/Time First Assessed: 09/06/24 1752   Location: Thigh  Wound Location Orientation: Anterior;Left  Wound Outcome: (c) Other (Comment)       [REMOVED] Wound 09/06/24 Ankle Anterior;Left (Removed)   Resolved Date: 09/09/24  Date First Assessed/Time First Assessed: 09/06/24 1804   Location: Ankle  Wound Location Orientation: Anterior;Left  Wound Outcome: Unknown (No longer present)       [REMOVED] Wound 09/06/24 Toe D2, second Anterior;Left (Removed)   Resolved Date: 09/09/24  Date First Assessed/Time First Assessed: 09/06/24 1804   Location: Toe D2, second  Wound Location Orientation: Anterior;Left  Wound Outcome: Unknown (No longer present)       [REMOVED] Wound 09/06/24 Pedal Anterior;Left (Removed)   Resolved Date: 09/09/24  Date First Assessed/Time First Assessed: 09/06/24 1805   Location: Pedal  Wound Location Orientation: Anterior;Left  Wound Outcome:  Unknown (No longer present)       Subjective:      .    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  Has been using Acticoat on the toe wound and silver alginate on the lateral foot and heel wounds.  Patient reports that he is doing his best to minimize ambulation, only walking when he has to like to get up for food or use the bathroom.  Patient is also here for an HBO consult for his Sumner 3 DFU's of the left foot.  Patient has been undergoing standard wound care for well over 30 days, initially by his podiatrist, Dr. Arango and then started his care here at the wound center on 8/15/2024.  Patient had a culture done on 8/8/2024 and was treated with antibiotics.  He is status post L distal SFA- BK pop bypass done 8/30/24 by Dr. Clark.  Patient follows routinely with his PCP for ongoing management of his type 2 diabetes, last A1c was done on 10/14/2024 and was 6.2.  Patient has a significant cardiac history and follows routinely with cardiology, last seen July 2024.  Patient noted to have chronic diastolic heart failure and history of CAD.  Echo done September 2024 shows an EF of 50%.  Patient denies any recent cardiac symptoms.  Patient is slightly claustrophobic, has required medication to obtain MRI in the past.        The following portions of the patient's history were reviewed and updated as appropriate: He  has a past medical history of Anemia, CAD (coronary artery disease), Callus, Cancer ([facility]), CHF (congestive heart failure) ([facility]), Chronic kidney disease, Clotting disorder ([facility]), Coronary artery disease (1988), Deep vein thrombosis ([facility]), Diabetes mellitus ([facility]), Difficulty walking, Duodenal ulcer, Heart disease (1988), Hyperlipidemia, Hypertension, Myocardial infarction ([facility]), Neuropathy, Neuropathy in diabetes ([facility]), Plantar fasciitis, and Sleep apnea.  He   Patient Active Problem List    Diagnosis Date Noted    Foot ulcer ([facility]) 09/07/2024    SOB (shortness of breath)  09/06/2024    Nausea 09/06/2024    Elevated troponin 09/06/2024    History of DVT (deep vein thrombosis) 08/26/2024    Diabetic ulcer of toe of left foot associated with type 2 diabetes mellitus, limited to breakdown of skin ([facility]) 08/08/2024    Plantar fasciitis, right 07/10/2024    Acute deep vein thrombosis (DVT) of left peroneal vein ([facility]) 05/01/2024    Iron deficiency anemia 04/23/2024    Shortness of breath 04/09/2024    History of colon polyps 02/22/2024    Duodenal ulcer 02/01/2024    Multiple gastric ulcers 12/27/2023    Iron deficiency anemia due to chronic blood loss 12/27/2023    Melena 12/26/2023    Symptomatic anemia 12/26/2023    Frequent PVCs 06/17/2023    Acute on chronic diastolic heart failure ([facility]) 06/16/2023    Stage 3b chronic kidney disease ([facility]) 06/16/2023    Diabetic ulcer of right midfoot associated with diabetes mellitus due to underlying condition, limited to breakdown of skin ([facility]) 06/15/2023    Hypertensive urgency 05/11/2023    Elevated troponin level not due myocardial infarction 05/11/2023    Stable angina pectoris ([facility]) 03/07/2023    Chronic kidney disease-mineral and bone disorder 11/30/2022    Mild aortic stenosis 11/11/2022    Chronic HFpEF/Moderate pHTN (HFpEF) ([facility]) 11/10/2022    Gross hematuria 07/26/2022    Platelets decreased ([facility]) 07/22/2022    Acute kidney injury superimposed on chronic kidney disease  ([facility]) 02/16/2022    Actinic keratoses 01/14/2022    Type 2 diabetes mellitus with diabetic peripheral angiopathy without gangrene, with long-term current use of insulin (HCC) 01/11/2022    Varicose veins of left lower extremity 06/25/2021    History of endovascular stent graft for abdominal aortic aneurysm (AAA) 06/25/2021    Bruit (arterial) 06/25/2021    Peripheral artery disease (HCC) 06/25/2021    Type 2 diabetes mellitus with diabetic polyneuropathy, with long-term current use of insulin ([facility]) 06/10/2021    Mixed hyperlipidemia 05/11/2021    Primary hypertension  05/11/2021    Coronary artery disease involving native coronary artery of native heart without angina pectoris 05/11/2021    S/P angioplasty with stent 05/11/2021    Hx of CABG 05/11/2021    S/P AAA repair 05/11/2021    S/P prostatectomy 05/11/2021    H/O prostate cancer 05/11/2021     He  has a past surgical history that includes Cardiac surgery (2002); Tonsillectomy; ADENOIDECTOMY; Coronary artery bypass graft; Cholecystectomy; Prostate surgery; Cardiac catheterization (Left, 10/19/2022); Colonoscopy (02/14/2024); pr slctv cathj 3rd+ ord slctv abdl pel/lxtr brnch (Right, 11/01/2023); IR lower extremity angiogram (11/01/2023); pr bypass w/vein femoral-popliteal (Right, 11/16/2023); Abdominal aortic aneurysm repair; Urinary sphincter implant; IR lower extremity angiogram (08/07/2024); pr slctv cathj 3rd+ ord slctv abdl pel/lxtr brnch (Left, 08/07/2024); Back surgery (1985); Laminectomy (1990); and pr bypass w/vein femoral-popliteal (Left, 8/30/2024).  He  reports that he quit smoking about 36 years ago. His smoking use included cigarettes. He started smoking about 68 years ago. He has a 49.5 pack-year smoking history. He has been exposed to tobacco smoke. He has never used smokeless tobacco. He reports that he does not currently use alcohol after a past usage of about 5.0 standard drinks of alcohol per week. He reports that he does not use drugs.  He is allergic to lisinopril..    Review of Systems   Constitutional:  Negative for chills and fever.   HENT:  Negative for congestion, postnasal drip, rhinorrhea, sinus pressure, sinus pain, sneezing and sore throat.    Respiratory:  Negative for cough, chest tightness and shortness of breath.    Cardiovascular:  Negative for chest pain and leg swelling.   Skin:  Positive for wound.   Neurological:  Negative for seizures.   Psychiatric/Behavioral:  Negative for agitation.         +mild claustrophobia         Objective:       Wound 08/15/24 Diabetic Ulcer Heel Left  (Active)   Enter Sumner score: Sumner Grade 3: Deep abscess, OM, or joint sepsis 10/15/24 0827   Wound Image Images linked 10/15/24 0918   Wound Description Pink;White;Yellow 10/15/24 0827   Clara-wound Assessment Callus;Maceration 10/15/24 0827   Wound Length (cm) 1.9 cm 10/15/24 0827   Wound Width (cm) 3.9 cm 10/15/24 0827   Wound Depth (cm) 0.6 cm 10/15/24 0827   Wound Surface Area (cm^2) 7.41 cm^2 10/15/24 0827   Wound Volume (cm^3) 4.446 cm^3 10/15/24 0827   Calculated Wound Volume (cm^3) 4.45 cm^3 10/15/24 0827   Change in Wound Size % -2372.22 10/15/24 0827   Undermining 1 0.4 10/15/24 0827   Undermining 1 is depth extending from 7-12 o'clock; deepest around 11 10/15/24 0827   Drainage Amount Large 10/15/24 0827   Drainage Description Yellow;Foul smelling;Brown 10/15/24 0827   Non-staged Wound Description Full thickness 10/15/24 0827   Dressing Status Intact (upon arrival) 10/15/24 0827       Wound 08/15/24 Diabetic Ulcer Plantar Left;Lateral (Active)   Wound Image Images linked 10/15/24 0918   Wound Description Pink;White;Pale 10/15/24 0825   Clara-wound Assessment Callus 10/15/24 0825   Wound Length (cm) 0.3 cm 10/15/24 0825   Wound Width (cm) 0.4 cm 10/15/24 0825   Wound Depth (cm) 0.3 cm 10/15/24 0825   Wound Surface Area (cm^2) 0.12 cm^2 10/15/24 0825   Wound Volume (cm^3) 0.036 cm^3 10/15/24 0825   Calculated Wound Volume (cm^3) 0.04 cm^3 10/15/24 0825   Change in Wound Size % 50 10/15/24 0825   Drainage Amount Small 10/15/24 0825   Drainage Description Serous 10/15/24 0825   Non-staged Wound Description Full thickness 10/15/24 0825   Dressing Status Intact (upon arrival) 10/15/24 0825       Wound 08/15/24 Diabetic Ulcer Toe D2, second Anterior;Left (Active)   Wound Image Images linked 10/15/24 0917   Wound Description Pink;Brown;Yellow 10/15/24 0829   Clara-wound Assessment Dry;Pink 10/15/24 0829   Wound Length (cm) 1.4 cm 10/15/24 0829   Wound Width (cm) 0.7 cm 10/15/24 0829   Wound Depth (cm) 0.1 cm  10/15/24 0829   Wound Surface Area (cm^2) 0.98 cm^2 10/15/24 0829   Wound Volume (cm^3) 0.098 cm^3 10/15/24 0829   Calculated Wound Volume (cm^3) 0.1 cm^3 10/15/24 0829   Change in Wound Size % 76.19 10/15/24 0829   Undermining 1 0 10/15/24 0829   Drainage Amount Scant 10/15/24 0829   Drainage Description Yellow 10/15/24 0829   Non-staged Wound Description Full thickness 10/15/24 0829   Dressing Status Intact (upon arrival) 10/15/24 0829       /72   Pulse 67   Temp 97.6 °F (36.4 °C)   Resp 15     Physical Exam  Vitals reviewed.   Constitutional:       General: He is not in acute distress.     Appearance: Normal appearance. He is not ill-appearing, toxic-appearing or diaphoretic.   HENT:      Head: Normocephalic and atraumatic.      Right Ear: Tympanic membrane, ear canal and external ear normal. There is no impacted cerumen.      Left Ear: External ear normal. There is impacted cerumen.   Eyes:      Conjunctiva/sclera: Conjunctivae normal.   Cardiovascular:      Rate and Rhythm: Normal rate and regular rhythm.      Pulses:           Dorsalis pedis pulses are 1+ on the left side.        Posterior tibial pulses are 1+ on the left side.      Heart sounds: Murmur heard.      No friction rub. No gallop.   Pulmonary:      Effort: Pulmonary effort is normal. No respiratory distress.      Breath sounds: Normal breath sounds. No wheezing, rhonchi or rales.   Musculoskeletal:      Cervical back: Neck supple.      Right lower leg: No edema.      Left lower leg: No edema.   Skin:     Comments: See wound assessment   Neurological:      Mental Status: He is alert.   Psychiatric:         Mood and Affect: Mood normal.         Behavior: Behavior normal.           Wound 08/15/24 Diabetic Ulcer Heel Left (Active)   Enter Sumner score: Sumner Grade 3: Deep abscess, OM, or joint sepsis 10/15/24 0827   Wound Image   10/15/24 0918   Wound Description Pink;White;Yellow 10/15/24 0827   Clara-wound Assessment Callus;Maceration  10/15/24 0827   Wound Length (cm) 1.9 cm 10/15/24 0827   Wound Width (cm) 3.9 cm 10/15/24 0827   Wound Depth (cm) 0.6 cm 10/15/24 0827   Wound Surface Area (cm^2) 7.41 cm^2 10/15/24 0827   Wound Volume (cm^3) 4.446 cm^3 10/15/24 0827   Calculated Wound Volume (cm^3) 4.45 cm^3 10/15/24 0827   Change in Wound Size % -2372.22 10/15/24 0827   Number of underminings 1 10/07/24 1117   Undermining 1 0.4 10/15/24 0827   Undermining 1 is depth extending from 7-12 o'clock; deepest around 11 10/15/24 0827   Drainage Amount Large 10/15/24 0827   Drainage Description Yellow;Foul smelling;Brown 10/15/24 0827   Non-staged Wound Description Full thickness 10/15/24 0827   Dressing Status Intact 10/15/24 0827       Wound 08/15/24 Diabetic Ulcer Plantar Left;Lateral (Active)   Enter Sumner score: Sumner Grade 2: Deep ulcer extended to ligament, tendon, joint capsule, bone, or deep fascia without abscess or osteomyelitis (OM) 10/07/24 1115   Wound Image   10/15/24 0918   Wound Description Pink;White;Pale 10/15/24 0825   Clara-wound Assessment Callus 10/15/24 0825   Wound Length (cm) 0.3 cm 10/15/24 0825   Wound Width (cm) 0.4 cm 10/15/24 0825   Wound Depth (cm) 0.3 cm 10/15/24 0825   Wound Surface Area (cm^2) 0.12 cm^2 10/15/24 0825   Wound Volume (cm^3) 0.036 cm^3 10/15/24 0825   Calculated Wound Volume (cm^3) 0.04 cm^3 10/15/24 0825   Change in Wound Size % 50 10/15/24 0825   Drainage Amount Small 10/15/24 0825   Drainage Description Serous 10/15/24 0825   Non-staged Wound Description Full thickness 10/15/24 0825   Dressing Status Intact 10/15/24 0825       Wound 08/15/24 Diabetic Ulcer Toe D2, second Anterior;Left (Active)   Enter Sumner score: Sumner Grade 3: Deep abscess, OM, or joint sepsis 10/07/24 1112   Wound Image   10/15/24 0917   Wound Description Pink;Brown;Yellow 10/15/24 0829   Clara-wound Assessment Dry;Pink 10/15/24 0829   Wound Length (cm) 1.4 cm 10/15/24 0829   Wound Width (cm) 0.7 cm 10/15/24 0829   Wound Depth (cm)  0.1 cm 10/15/24 0829   Wound Surface Area (cm^2) 0.98 cm^2 10/15/24 0829   Wound Volume (cm^3) 0.098 cm^3 10/15/24 0829   Calculated Wound Volume (cm^3) 0.1 cm^3 10/15/24 0829   Change in Wound Size % 76.19 10/15/24 0829   Number of underminings 1 09/30/24 0906   Undermining 1 0 10/15/24 0829   Undermining 1 is depth extending from 12-12 10/07/24 1112   Drainage Amount Scant 10/15/24 0829   Drainage Description Yellow 10/15/24 0829   Non-staged Wound Description Full thickness 10/15/24 0829   Dressing Status Intact 10/15/24 0829                     HBO Qualification & Assessment     Thomas Horne presents today for a consultation for HBO treatment.    HBO Indication    Diabetic Lower Extremity Ulcer    Diabetes mellitus is documented as the primary or secondary diagnosis {YES/NO:[id number]    Lower extremity ulcer location Left foot    Sumner grade 3    Sumner grade sustaniated by Culture     Debridement has been performed and is documented Yes    Received standard wound care for 30 days prior to HBO Yes    Blood glucose optimized for this patient Yes    Assessment of patient vascular status assessed: Patient is s/p L distal SFA- BK pop bypass done 8/30/24     Offloading has been addressed/ordered Yes    Nutritional status optimized Yes     Infection addresses and treated Yes    Use of appropriate moist dressings to maintain clean wound Yes    Despite appropriate wound care, diabetic foot ulcer did not show healing in 4 consecutive weeks. Based on the information included Thomas Horne has a medical necessity for HBO and meets the conditions for adjunctive treatment to wound care.  Yes    Brief history of co-morbidities that may affect HBO treatment    Previously treated with No chemotherapy or medications which are contraindications to HBO    Patient reports s/s of No acute infections    Eyes    Patient has Myopia and Cataracts    Ears    Patient has a history of No ear abnormalities or conditions    Ears assessed for  tympanic membrane or middle are abnormalities  yes    Patient instructed on how to clear ears and demonstrate proper technique Yes    Right ear baseline TEEDS Grade 0    Left ear baseline TEEDS Unable to assess, cerumen impaction present    Cerumen removal to be performed by PCP in 2 days as scheduled.   Patient instructed to use Debrox drops until scheduled appointment    Neurological    Patient abreu a history of seizures No    Cardiovascular    Patient has a history of Cardiac stents    EKG ordered No    Echocardiogram ordered No    Ejection fraction : 50%    Cardiovascular consult ordered No    Smoking  Social History     Tobacco Use   Smoking Status Former    Current packs/day: 0.00    Average packs/day: 1.5 packs/day for 33.0 years (49.5 ttl pk-yrs)    Types: Cigarettes    Start date: 1956    Quit date:     Years since quittin.8    Passive exposure: Past   Smokeless Tobacco Never       Respiratory    Patient has a history of pneumothorax No    Patient has a history of No respiratory conditions requiring further work-up prior to HBO    Lungs clear bilaterally Yes      Chest x-ray ordered Yes    Psychiatric    Patient has confinement anxiety Yes    Patient education and consent    I discussed with and educated the patient on the risks and benefits of HBO, different treatment options, potential adverse side effects and side effects, HBO procedure, smoking/drug/alcohol policy, and itesm not allowed in the hyperbaric chamber.  Yes    Written consent for HBO obtained Yes    A trained emergency response team and ICU is available in this facility to assist with complications if required. Yes    HBO treatment goal    Complete wound healing and Angiogenesis      Wound Instructions:  Orders Placed This Encounter   Procedures    Wound cleansing and dressings     Please have chest xray done before hyperbaric oxygen treatment can begin.  Use the debrox as discussed for appt with Dr. Lombardi; he will try to  clear out the wax.    Left foot wounds:      Hold santyl, do not discard. Store per manufacturers recommendations      Wash your hands with soap and water. Remove old dressing, discard into plastic bag and place in trash. Cleanse the wound with dakins moistened gauze prior to applying a clean dressing. Do not use tissue or cotton balls. Do not scrub the wound. Pat dry using gauze.      Shower no, unless able to keep wounds dry.      Apply lotion to skin surrounding wound   Apply Silver alginate to the open wound.   Cover with abd (gauze to toe wound)   Secure with rolled gauze and tape      Change dressing 3 times weekly      Left toe wound:      Wash your hands with soap and water.  Remove old dressing, discard into plastic bag and place in trash.  Cleanse the wound with dakins moistened gauze prior to applying a clean dressing. Do not use tissue or cotton balls. Do not scrub the wound. Pat dry using gauze.  Shower no, unless able to keep wounds dry       Apply Acticoat 3 to the open wound.    Cover with gauze  Secure with tape  Change dressing 3 times weekly               Off-loading Instructions: Wear off-loading device as directed by your physician (Globo Ped). Put on immediately when rising in the morning and remove when going to bed and Turn every 2 hours. Avoid position directing pressure to Wound site. Limit side lying to 30 degree tilt. Limit the head of bed elevation to 30 degrees. Limit walking and standing to only necessity for activities of daily living.      Wound infection: If you have signs of infection please call the wound center. If the wound center is closedplease go to the Emergency department. Some signs of infection: fever, chills, increased redness, red streaks, increase in pain, increased drainage. Drainage with an odor, Change in drainage color: white/milky/green/tan/yellow, an increase in swelling, chest pain and/or shortness of breath.     Protein: Eat protein with each meal to promote  healing. Examples of protein are fish, meat, chicken, nuts, peanut butter, eggs, lentils, edamame or a protein shake.      Continue with [facility] for wound care, Follow instructions for care, do not use bordered foam     Standing Status:   Future     Standing Expiration Date:   10/22/2024    Wound Procedure Treatment     This order was created via procedure documentation    Wound Procedure Treatment Diabetic Ulcer Anterior;Left Toe D2, second     This order was created via procedure documentation    Debridement     This order was created via procedure documentation    Debridement     This order was created via procedure documentation    Debridement     This order was created via procedure documentation    XR chest pa and lateral     Standing Status:   Future     Number of Occurrences:   1     Standing Expiration Date:   10/15/2028     Scheduling Instructions:      Bring along any outside films relating to this procedure.              Diagnosis ICD-10-CM Associated Orders   1. Diabetic ulcer of left foot associated with type 2 diabetes mellitus, with muscle involvement without evidence of necrosis, unspecified part of foot ([facility])  E11.621 lidocaine (LMX) 4 % cream    L97.525 Wound cleansing and dressings     XR chest pa and lateral     sodium hypochlorite (DAKIN'S HALF-STRENGTH) external solution      2. Peripheral artery disease ([facility])  I73.9 lidocaine (LMX) 4 % cream     Wound cleansing and dressings      3. Type 2 diabetes mellitus with diabetic peripheral angiopathy without gangrene, with long-term current use of insulin ([facility])  E11.51 lidocaine (LMX) 4 % cream    Z79.4 Wound cleansing and dressings

## 2024-10-15 NOTE — LETTER
[facility]  [address] [address]  Phone#  [phone]  Fax#  [phone]    Patient:  Thomas Horne  YOB: 1945  Phone:  [phone]  Date of Visit:  10/15/2024    Orders Placed This Encounter   Procedures   • Wound cleansing and dressings     Please have chest xray done before hyperbaric oxygen treatment can begin.  Use the debrox as discussed for appt with Dr. Lombardi; he will try to clear out the wax.    Left foot wounds:      Hold santyl, do not discard. Store per manufacturers recommendations      Wash your hands with soap and water. Remove old dressing, discard into plastic bag and place in trash. Cleanse the wound with dakins moistened gauze prior to applying a clean dressing. Do not use tissue or cotton balls. Do not scrub the wound. Pat dry using gauze.      Shower no, unless able to keep wounds dry.      Apply lotion to skin surrounding wound   Apply Silver alginate to the open wound.   Cover with abd (gauze to toe wound)   Secure with rolled gauze and tape      Change dressing 3 times weekly      Left toe wound:      Wash your hands with soap and water.  Remove old dressing, discard into plastic bag and place in trash.  Cleanse the wound with dakins moistened gauze prior to applying a clean dressing. Do not use tissue or cotton balls. Do not scrub the wound. Pat dry using gauze.  Shower no, unless able to keep wounds dry       Apply Acticoat 3 to the open wound.    Cover with gauze  Secure with tape  Change dressing 3 times weekly               Off-loading Instructions: Wear off-loading device as directed by your physician (Globo Ped). Put on immediately when rising in the morning and remove when going to bed and Turn every 2 hours. Avoid position directing pressure to Wound site. Limit side lying to 30 degree tilt. Limit the head of bed elevation to 30 degrees. Limit walking and standing to only necessity for activities of daily living.       Wound infection: If you have signs of infection please call the wound center. If the wound center is closedplease go to the Emergency department. Some signs of infection: fever, chills, increased redness, red streaks, increase in pain, increased drainage. Drainage with an odor, Change in drainage color: white/milky/green/tan/yellow, an increase in swelling, chest pain and/or shortness of breath.     Protein: Eat protein with each meal to promote healing. Examples of protein are fish, meat, chicken, nuts, peanut butter, eggs, lentils, edamame or a protein shake.      Continue with [facility] for wound care, Follow instructions for care, do not use bordered foam     Standing Status:   Future     Standing Expiration Date:   10/22/2024   • Wound Procedure Treatment     This order was created via procedure documentation   • Wound Procedure Treatment Diabetic Ulcer Anterior;Left Toe D2, second     This order was created via procedure documentation         Electronically signed by Nitza Sotelo DO

## 2024-10-15 NOTE — PATIENT INSTRUCTIONS
Orders Placed This Encounter   Procedures    Wound cleansing and dressings     Please have chest xray done before hyperbaric oxygen treatment can begin.  Use the debrox as discussed for appt with Dr. Lombardi; he will try to clear out the wax.    Left foot wounds:      Hold santyl, do not discard. Store per manufacturers recommendations      Wash your hands with soap and water. Remove old dressing, discard into plastic bag and place in trash. Cleanse the wound with dakins moistened gauze prior to applying a clean dressing. Do not use tissue or cotton balls. Do not scrub the wound. Pat dry using gauze.      Shower no, unless able to keep wounds dry.      Apply lotion to skin surrounding wound   Apply Silver alginate to the open wound.   Cover with abd (gauze to toe wound)   Secure with rolled gauze and tape      Change dressing 3 times weekly      Left toe wound:      Wash your hands with soap and water.  Remove old dressing, discard into plastic bag and place in trash.  Cleanse the wound with dakins moistened gauze prior to applying a clean dressing. Do not use tissue or cotton balls. Do not scrub the wound. Pat dry using gauze.  Shower no, unless able to keep wounds dry       Apply Acticoat 3 to the open wound.    Cover with gauze  Secure with tape  Change dressing 3 times weekly               Off-loading Instructions: Wear off-loading device as directed by your physician (Dinho Ped). Put on immediately when rising in the morning and remove when going to bed and Turn every 2 hours. Avoid position directing pressure to Wound site. Limit side lying to 30 degree tilt. Limit the head of bed elevation to 30 degrees. Limit walking and standing to only necessity for activities of daily living.      Wound infection: If you have signs of infection please call the wound center. If the wound center is closedplease go to the Emergency department. Some signs of infection: fever, chills, increased redness, red streaks, increase  in pain, increased drainage. Drainage with an odor, Change in drainage color: white/milky/green/tan/yellow, an increase in swelling, chest pain and/or shortness of breath.     Protein: Eat protein with each meal to promote healing. Examples of protein are fish, meat, chicken, nuts, peanut butter, eggs, lentils, edamame or a protein shake.      Continue with [facility] for wound care, Follow instructions for care, do not use bordered foam     Standing Status:   Future     Standing Expiration Date:   10/22/2024

## 2024-10-15 NOTE — PROGRESS NOTES
Call placed to [facility] and spoke with Jean Claude who stated that CPT codes g0277 and 81889 require authorization.  Requested 160 units  and 40 units [id number].  Authorization was approved as of 10/16/24 till 4/15/25.  Reference number [id number].  A fax will be sent over stating approval.

## 2024-10-16 RX ORDER — AMLODIPINE BESYLATE 10 MG/1
15 TABLET ORAL DAILY
Qty: 45 TABLET | Refills: 1 | Status: SHIPPED | OUTPATIENT
Start: 2024-10-16

## 2024-10-17 ENCOUNTER — OFFICE VISIT (OUTPATIENT)
Dept: FAMILY MEDICINE CLINIC | Facility: CLINIC | Age: 79
End: 2024-10-17
Payer: COMMERCIAL

## 2024-10-17 VITALS
WEIGHT: 229.8 LBS | DIASTOLIC BLOOD PRESSURE: 60 MMHG | SYSTOLIC BLOOD PRESSURE: 136 MMHG | HEART RATE: 60 BPM | BODY MASS INDEX: 29.49 KG/M2 | HEIGHT: 74 IN | RESPIRATION RATE: 18 BRPM | TEMPERATURE: 97.1 F

## 2024-10-17 DIAGNOSIS — L97.525 DIABETIC ULCER OF LEFT FOOT ASSOCIATED WITH TYPE 2 DIABETES MELLITUS, WITH MUSCLE INVOLVEMENT WITHOUT EVIDENCE OF NECROSIS, UNSPECIFIED PART OF FOOT (HCC): Primary | ICD-10-CM

## 2024-10-17 DIAGNOSIS — E11.621 DIABETIC ULCER OF LEFT FOOT ASSOCIATED WITH TYPE 2 DIABETES MELLITUS, WITH MUSCLE INVOLVEMENT WITHOUT EVIDENCE OF NECROSIS, UNSPECIFIED PART OF FOOT (HCC): Primary | ICD-10-CM

## 2024-10-17 DIAGNOSIS — H61.22 IMPACTED CERUMEN OF LEFT EAR: ICD-10-CM

## 2024-10-17 DIAGNOSIS — E11.51 TYPE 2 DIABETES MELLITUS WITH DIABETIC PERIPHERAL ANGIOPATHY WITHOUT GANGRENE, WITH LONG-TERM CURRENT USE OF INSULIN (HCC): Primary | ICD-10-CM

## 2024-10-17 DIAGNOSIS — D50.9 IRON DEFICIENCY ANEMIA, UNSPECIFIED IRON DEFICIENCY ANEMIA TYPE: ICD-10-CM

## 2024-10-17 DIAGNOSIS — I10 PRIMARY HYPERTENSION: ICD-10-CM

## 2024-10-17 DIAGNOSIS — N18.32 STAGE 3B CHRONIC KIDNEY DISEASE (HCC): ICD-10-CM

## 2024-10-17 DIAGNOSIS — I25.10 CORONARY ARTERY DISEASE INVOLVING NATIVE CORONARY ARTERY OF NATIVE HEART WITHOUT ANGINA PECTORIS: ICD-10-CM

## 2024-10-17 DIAGNOSIS — Z79.4 TYPE 2 DIABETES MELLITUS WITH DIABETIC PERIPHERAL ANGIOPATHY WITHOUT GANGRENE, WITH LONG-TERM CURRENT USE OF INSULIN (HCC): Primary | ICD-10-CM

## 2024-10-17 DIAGNOSIS — E78.2 MIXED HYPERLIPIDEMIA: ICD-10-CM

## 2024-10-17 PROCEDURE — 92250 FUNDUS PHOTOGRAPHY W/I&R: CPT | Performed by: FAMILY MEDICINE

## 2024-10-17 PROCEDURE — 99214 OFFICE O/P EST MOD 30 MIN: CPT | Performed by: FAMILY MEDICINE

## 2024-10-17 PROCEDURE — 69210 REMOVE IMPACTED EAR WAX UNI: CPT | Performed by: FAMILY MEDICINE

## 2024-10-17 RX ORDER — ALPRAZOLAM 0.5 MG
0.5 TABLET ORAL
Qty: 5 TABLET | Refills: 0 | Status: SHIPPED | OUTPATIENT
Start: 2024-10-17

## 2024-10-17 NOTE — ASSESSMENT & PLAN NOTE
Will keep meds as they are today  Lab Results   Component Value Date    HGBA1C 6.2 (H) 10/14/2024

## 2024-10-17 NOTE — ASSESSMENT & PLAN NOTE
Lab Results   Component Value Date    EGFR 42 10/14/2024    EGFR 53 09/07/2024    EGFR 64 09/06/2024    CREATININE 1.51 (H) 10/14/2024    CREATININE 1.25 09/07/2024    CREATININE 1.07 09/06/2024   Pt sees Nephrology - for renal failure - pt has an upcoming appt

## 2024-10-17 NOTE — PROGRESS NOTES
Assessment/Plan:    1. Type 2 diabetes mellitus with diabetic peripheral angiopathy without gangrene, with long-term current use of insulin ([facility])  Assessment & Plan:  Will keep meds as they are today  Lab Results   Component Value Date    HGBA1C 6.2 (H) 10/14/2024     Orders:  -     Albumin / creatinine urine ratio; Future; Expected date: 02/14/2025  -     Comprehensive metabolic panel; Future; Expected date: 02/14/2025  -     Hemoglobin A1C; Future; Expected date: 02/14/2025  -     CBC and differential; Future; Expected date: 02/14/2025  -     IRIS Diabetic eye exam  2. Primary hypertension  -     Albumin / creatinine urine ratio; Future; Expected date: 02/14/2025  -     Comprehensive metabolic panel; Future; Expected date: 02/14/2025  -     Hemoglobin A1C; Future; Expected date: 02/14/2025  -     CBC and differential; Future; Expected date: 02/14/2025  3. Mixed hyperlipidemia  -     Albumin / creatinine urine ratio; Future; Expected date: 02/14/2025  -     Comprehensive metabolic panel; Future; Expected date: 02/14/2025  -     Hemoglobin A1C; Future; Expected date: 02/14/2025  -     CBC and differential; Future; Expected date: 02/14/2025  4. Iron deficiency anemia, unspecified iron deficiency anemia type  -     Albumin / creatinine urine ratio; Future; Expected date: 02/14/2025  -     Comprehensive metabolic panel; Future; Expected date: 02/14/2025  -     Hemoglobin A1C; Future; Expected date: 02/14/2025  -     CBC and differential; Future; Expected date: 02/14/2025  5. Coronary artery disease involving native coronary artery of native heart without angina pectoris  6. Stage 3b chronic kidney disease (HCC)  Assessment & Plan:  Lab Results   Component Value Date    EGFR 42 10/14/2024    EGFR 53 09/07/2024    EGFR 64 09/06/2024    CREATININE 1.51 (H) 10/14/2024    CREATININE 1.25 09/07/2024    CREATININE 1.07 09/06/2024   Pt sees Nephrology - for renal failure - pt has an upcoming appt  7. Impacted cerumen of left  ear          There are no Patient Instructions on file for this visit.    Return in 4 months (on 2/17/2025) for Diabetes follow-up.    Subjective:      Patient ID: Thomas Horne is a 79 y.o. male.    Chief Complaint   Patient presents with    Follow-up     Betty Herman CMA        Pt is sched for a 5 week follow up  Meds were adjusted at her last appt    Pt states it was requested I check his ears          The following portions of the patient's history were reviewed and updated as appropriate: allergies, current medications, past family history, past medical history, past social history, past surgical history and problem list.    Review of Systems   Constitutional:  Negative for activity change, appetite change, chills, diaphoresis, fatigue, fever and unexpected weight change.   HENT:  Negative for congestion, dental problem, ear pain, mouth sores, sinus pressure, sinus pain, sore throat and trouble swallowing.    Eyes:  Negative for photophobia, discharge and itching.   Respiratory:  Negative for apnea, chest tightness and shortness of breath.    Cardiovascular:  Negative for chest pain, palpitations and leg swelling.   Gastrointestinal:  Negative for abdominal distention, abdominal pain, blood in stool, nausea and vomiting.   Endocrine: Negative for cold intolerance, heat intolerance, polydipsia, polyphagia and polyuria.   Genitourinary:  Negative for difficulty urinating.   Musculoskeletal:  Negative for arthralgias.   Skin:  Negative for color change and wound.   Neurological:  Negative for dizziness, syncope, speech difficulty and headaches.   Hematological:  Negative for adenopathy.   Psychiatric/Behavioral:  Negative for agitation and behavioral problems.          Current Outpatient Medications   Medication Sig Dispense Refill    acetaminophen (TYLENOL) 325 mg tablet Take 2 tablets (650 mg total) by mouth every 6 (six) hours as needed for mild pain      amLODIPine (NORVASC) 10 mg tablet TAKE ONE-HALF TABLET BY  MOUTH  DAILY 45 tablet 1    aspirin 81 mg chewable tablet Chew 81 mg daily      atorvastatin (LIPITOR) 40 mg tablet TAKE 1 TABLET BY MOUTH DAILY 90 tablet 1    Blood Glucose Monitoring Suppl (OneTouch Verio Reflect) w/Device KIT Check blood sugars twice daily. Please substitute with appropriate alternative as covered by patient's insurance. Dx: E11.65 1 kit 0    cloNIDine (CATAPRES) 0.1 mg tablet take 1 tablet by mouth every 12 hours 180 tablet 1    clopidogrel (PLAVIX) 75 mg tablet Take 1 tablet (75 mg total) by mouth daily 30 tablet 3    collagenase (SANTYL) ointment Apply topically daily 30 g 0    Droplet Pen Needles 32G X 4 MM MISC USE EVERY EVENING 100 each 5    DULoxetine (CYMBALTA) 30 mg delayed release capsule take 1 capsule by mouth once daily 90 capsule 0    Empagliflozin (Jardiance) 25 MG TABS TAKE 1 TABLET BY MOUTH DAILY 90 tablet 1    fenofibrate 160 MG tablet TAKE 1 TABLET BY MOUTH DAILY 100 tablet 1    glucose blood (OneTouch Verio) test strip TEST TWICE A  strip 1    insulin glargine (LANTUS) 100 units/mL subcutaneous injection Inject 15 Units under the skin daily at bedtime (Patient taking differently: Inject 15 Units under the skin daily at bedtime If BS > 150) 10 mL 0    isosorbide mononitrate (IMDUR) 30 mg 24 hr tablet TAKE 3 TABLETS BY MOUTH DAILY 270 tablet 1    ketoconazole (NIZORAL) 2 % cream Apply topically daily 60 g 1    metFORMIN (GLUCOPHAGE) 500 mg tablet Take 1 tablet (500 mg total) by mouth 2 (two) times a day with meals 180 tablet 1    metoprolol tartrate (LOPRESSOR) 50 mg tablet Take 0.5 tablets (25 mg total) by mouth every 12 (twelve) hours (Patient taking differently: Take 25 mg by mouth 2 (two) times a day) 90 tablet 1    Multiple Vitamins-Minerals (MULTIVITAMIN MEN 50+ PO) Take by mouth daily      nitroglycerin (NITROSTAT) 0.4 mg SL tablet Place 1 tablet (0.4 mg total) under the tongue every 5 (five) minutes as needed for chest pain 30 tablet 0    Omega-3 Fatty Acids (fish  "oil) 1,000 mg Take 4,000 mg by mouth 2 (two) times a day      ondansetron (ZOFRAN) 8 mg tablet Take 0.5 tablets (4 mg total) by mouth every 6 (six) hours as needed for nausea or vomiting 20 tablet 0    OneTouch Delica Lancets 33G MISC Check blood sugars twice daily. Please substitute with appropriate alternative as covered by patient's insurance. Dx: E11.65 200 each 3    pantoprazole (PROTONIX) 40 mg tablet TAKE 1 TABLET BY MOUTH DAILY 90 tablet 1    pentoxifylline (TRENtal) 400 mg ER tablet TAKE 1 TABLET BY MOUTH 3 TIMES  DAILY WITH MEALS 270 tablet 3    pregabalin (LYRICA) 150 mg capsule Take 1 capsule (150 mg total) by mouth 3 (three) times a day 270 capsule 0    ranolazine (RANEXA) 1000 MG SR tablet Take 1 tablet (1,000 mg total) by mouth 2 (two) times a day 180 tablet 3    senna (SENOKOT) 8.6 mg Take 1 tablet (8.6 mg total) by mouth daily Stool softener for constipation-- hold loose stools      sitaGLIPtin (Januvia) 100 mg tablet TAKE 1 TABLET BY MOUTH DAILY 100 tablet 1    sodium hypochlorite (DAKIN'S HALF-STRENGTH) external solution Apply 1 Application topically every other day At each dressing change 473 mL 2    torsemide (DEMADEX) 20 mg tablet TAKE 1 TABLET BY MOUTH DAILY 90 tablet 1     No current facility-administered medications for this visit.       Objective:    /60   Pulse 60   Temp (!) 97.1 °F (36.2 °C)   Resp 18   Ht 6' 2\" (1.88 m)   Wt 104 kg (229 lb 12.8 oz)   BMI 29.50 kg/m²        Physical Exam  Vitals and nursing note reviewed.   Constitutional:       General: He is not in acute distress.     Appearance: He is well-developed. He is not diaphoretic.   HENT:      Head: Normocephalic and atraumatic.      Right Ear: External ear normal.      Left Ear: External ear normal. There is impacted cerumen.      Nose: Nose normal.      Mouth/Throat:      Pharynx: No oropharyngeal exudate.   Eyes:      General: No scleral icterus.        Right eye: No discharge.         Left eye: No discharge.    " "  Pupils: Pupils are equal, round, and reactive to light.   Neck:      Thyroid: No thyromegaly.   Cardiovascular:      Rate and Rhythm: Normal rate.      Heart sounds: Normal heart sounds. No murmur heard.  Pulmonary:      Effort: Pulmonary effort is normal. No respiratory distress.      Breath sounds: Normal breath sounds. No wheezing.   Abdominal:      General: Bowel sounds are normal. There is no distension.      Palpations: Abdomen is soft. There is no mass.      Tenderness: There is no abdominal tenderness. There is no guarding or rebound.   Musculoskeletal:         General: Normal range of motion.   Skin:     General: Skin is warm and dry.      Findings: No erythema or rash.   Neurological:      Mental Status: He is alert.      Coordination: Coordination normal.      Deep Tendon Reflexes: Reflexes normal.   Psychiatric:         Behavior: Behavior normal.         Ear cerumen removal    Date/Time: 10/17/2024 11:00 AM    Performed by: Frank Lombardi, DO  Authorized by: Frank Lombardi, DO  [facility] Protocol:  procedure performed by consultantConsent: Verbal consent obtained.  Risks and benefits: risks, benefits and alternatives were discussed  Consent given by: patient  Time out: Immediately prior to procedure a \"time out\" was called to verify the correct patient, procedure, equipment, support staff and site/side marked as required.  Timeout called at: 10/17/2024 11:28 AM.  Patient understanding: patient states understanding of the procedure being performed  Patient consent: the patient's understanding of the procedure matches consent given  Procedure consent: procedure consent matches procedure scheduled  Relevant documents: relevant documents present and verified  Test results: test results available and properly labeled  Site marked: the operative site was marked  Radiology Images displayed and confirmed. If images not available, report reviewed: imaging studies available  Required items: required blood " products, implants, devices, and special equipment available  Patient identity confirmed: verbally with patient    Patient location:  Clinic  Indications / Diagnosis:  Impacted cerumen  Procedure details:     Local anesthetic:  None    Location:  L ear    Procedure type: curette      Procedure type comment:  And irrigation    Approach:  Natural orifice  Post-procedure details:     Complication:  None    Hearing quality:  Improved    Patient tolerance of procedure:  Tolerated well, no immediate complications           Frank Lombardi, DO

## 2024-10-18 ENCOUNTER — TELEPHONE (OUTPATIENT)
Dept: FAMILY MEDICINE CLINIC | Facility: CLINIC | Age: 79
End: 2024-10-18

## 2024-10-18 LAB
LEFT EYE DIABETIC RETINOPATHY: NORMAL
LEFT EYE IMAGE QUALITY: NORMAL
LEFT EYE MACULAR EDEMA: NORMAL
LEFT EYE OTHER RETINOPATHY: NORMAL
RIGHT EYE DIABETIC RETINOPATHY: NORMAL
RIGHT EYE IMAGE QUALITY: NORMAL
RIGHT EYE MACULAR EDEMA: NORMAL
RIGHT EYE OTHER RETINOPATHY: NORMAL
SEVERITY (EYE EXAM): NORMAL

## 2024-10-21 NOTE — TELEPHONE ENCOUNTER
Whitley from would management called and stated pt is to start HB therapy tomorrow at 8am and they are asking if his urinary sphincter has any metal or electricity in the device.    Call [phone] Whitley (wound management)

## 2024-10-22 ENCOUNTER — DOCUMENTATION (OUTPATIENT)
Dept: WOUND CARE | Facility: HOSPITAL | Age: 79
End: 2024-10-22

## 2024-10-22 NOTE — PROGRESS NOTES
Made Thomas aware that we are waiting on documentation from [facility] [place].  We will have him come on Thursday for wound care and HBO

## 2024-10-22 NOTE — PROGRESS NOTES
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    Spoke to Marie at [facility] and she will have the office send over the model of the sphincter that was placed

## 2024-10-22 NOTE — TELEPHONE ENCOUNTER
Discussed with JEF Collins ok with hyperbaric tx.     Called Wound Care and spoke with Whitley. Informed her per doctor AUS is not contraindicated for hyperbaric tx. She verbalized understanding.

## 2024-10-24 ENCOUNTER — DOCUMENTATION (OUTPATIENT)
Dept: WOUND CARE | Facility: HOSPITAL | Age: 79
End: 2024-10-24

## 2024-10-24 ENCOUNTER — OFFICE VISIT (OUTPATIENT)
Dept: WOUND CARE | Facility: HOSPITAL | Age: 79
End: 2024-10-24
Payer: COMMERCIAL

## 2024-10-24 ENCOUNTER — PATIENT OUTREACH (OUTPATIENT)
Dept: CASE MANAGEMENT | Facility: HOSPITAL | Age: 79
End: 2024-10-24

## 2024-10-24 VITALS
TEMPERATURE: 97.8 F | HEART RATE: 81 BPM | RESPIRATION RATE: 15 BRPM | SYSTOLIC BLOOD PRESSURE: 162 MMHG | DIASTOLIC BLOOD PRESSURE: 74 MMHG

## 2024-10-24 DIAGNOSIS — E11.621 DIABETIC ULCER OF LEFT FOOT ASSOCIATED WITH TYPE 2 DIABETES MELLITUS, WITH MUSCLE INVOLVEMENT WITHOUT EVIDENCE OF NECROSIS, UNSPECIFIED PART OF FOOT (HCC): Primary | ICD-10-CM

## 2024-10-24 DIAGNOSIS — E11.51 TYPE 2 DIABETES MELLITUS WITH DIABETIC PERIPHERAL ANGIOPATHY WITHOUT GANGRENE, WITH LONG-TERM CURRENT USE OF INSULIN (HCC): ICD-10-CM

## 2024-10-24 DIAGNOSIS — Z79.4 TYPE 2 DIABETES MELLITUS WITH DIABETIC PERIPHERAL ANGIOPATHY WITHOUT GANGRENE, WITH LONG-TERM CURRENT USE OF INSULIN (HCC): ICD-10-CM

## 2024-10-24 DIAGNOSIS — I73.9 PERIPHERAL ARTERY DISEASE (HCC): ICD-10-CM

## 2024-10-24 DIAGNOSIS — L97.522 DIABETIC ULCER OF LEFT FOOT ASSOCIATED WITH TYPE 2 DIABETES MELLITUS, WITH FAT LAYER EXPOSED, UNSPECIFIED PART OF FOOT (HCC): ICD-10-CM

## 2024-10-24 DIAGNOSIS — L97.525 DIABETIC ULCER OF LEFT FOOT ASSOCIATED WITH TYPE 2 DIABETES MELLITUS, WITH MUSCLE INVOLVEMENT WITHOUT EVIDENCE OF NECROSIS, UNSPECIFIED PART OF FOOT (HCC): Primary | ICD-10-CM

## 2024-10-24 DIAGNOSIS — E11.621 DIABETIC ULCER OF LEFT FOOT ASSOCIATED WITH TYPE 2 DIABETES MELLITUS, WITH FAT LAYER EXPOSED, UNSPECIFIED PART OF FOOT (HCC): ICD-10-CM

## 2024-10-24 PROCEDURE — 11042 DBRDMT SUBQ TIS 1ST 20SQCM/<: CPT | Performed by: FAMILY MEDICINE

## 2024-10-24 RX ORDER — LIDOCAINE 40 MG/G
CREAM TOPICAL ONCE
Status: COMPLETED | OUTPATIENT
Start: 2024-10-24 | End: 2024-10-24

## 2024-10-24 RX ADMIN — LIDOCAINE: 40 CREAM TOPICAL at 10:25

## 2024-10-24 NOTE — PATIENT INSTRUCTIONS
Orders Placed This Encounter   Procedures    Wound cleansing and dressings     LEFT HEEL WOUND:      Hold santyl, do not discard. Store per manufacturers recommendations      Wash your hands with soap and water. Remove old dressing, discard into plastic bag and place in trash. Cleanse the wound with dakins moistened gauze prior to applying a clean dressing. Do not use tissue or cotton balls. Do not scrub the wound. Pat dry using gauze.      Shower no, unless able to keep wounds dry.      Apply lotion to skin surrounding wound   Apply Silver alginate to the open wound.   Cover with abd (gauze to toe wound)   Secure with rolled gauze and tape      Change dressing 3 times weekly      LEFT TOE AND LEFT LATERAL FOOT WOUND:     Wash your hands with soap and water.  Remove old dressing, discard into plastic bag and place in trash.  Cleanse the wound with dakins moistened gauze prior to applying a clean dressing. Do not use tissue or cotton balls. Do not scrub the wound. Pat dry using gauze.  Shower no, unless able to keep wounds dry     Apply Puracol AG (collagen) to the open wound and put a drop of Hydrogel on the collagen.  Cover with gauze  Secure with tape  Change dressing 3 times weekly             Off-loading Instructions: Wear off-loading device as directed by your physician (Globo Ped). Put on immediately when rising in the morning and remove when going to bed and Turn every 2 hours. Avoid position directing pressure to Wound site. Limit side lying to 30 degree tilt. Limit the head of bed elevation to 30 degrees. Limit walking and standing to only necessity for activities of daily living.      Wound infection: If you have signs of infection please call the wound center. If the wound center is closedplease go to the Emergency department. Some signs of infection: fever, chills, increased redness, red streaks, increase in pain, increased drainage. Drainage with an odor, Change in drainage color:  white/milky/green/tan/yellow, an increase in swelling, chest pain and/or shortness of breath.     Protein: Eat protein with each meal to promote healing. Examples of protein are fish, meat, chicken, nuts, peanut butter, eggs, lentils, edamame or a protein shake.      Continue with [facility] for wound care, Follow instructions for care, do not use bordered foam     Standing Status:   Future     Standing Expiration Date:   10/31/2024

## 2024-10-24 NOTE — PROGRESS NOTES
"Patient ID: Thomas Horne is a 79 y.o. male Date of Birth 1945     Chief Complaint  Chief Complaint   Patient presents with    Follow Up Wound Care Visit     Left toe, foot       Allergies  Lisinopril    Assessment:    No problem-specific Assessment & Plan notes found for this encounter.       Diagnoses and all orders for this visit:    Diabetic ulcer of left foot associated with type 2 diabetes mellitus, with muscle involvement without evidence of necrosis, unspecified part of foot (HCC)  -     lidocaine (LMX) 4 % cream  -     Wound cleansing and dressings; Future  -     Wound Procedure Treatment Diabetic Ulcer Left Heel  -     Wound Procedure Treatment  -     Debridement Diabetic Ulcer Left Heel    Peripheral artery disease (HCC)  -     lidocaine (LMX) 4 % cream  -     Wound cleansing and dressings; Future  -     Wound Procedure Treatment Diabetic Ulcer Left Heel  -     Wound Procedure Treatment    Type 2 diabetes mellitus with diabetic peripheral angiopathy without gangrene, with long-term current use of insulin (HCC)  -     lidocaine (LMX) 4 % cream  -     Wound cleansing and dressings; Future  -     Wound Procedure Treatment Diabetic Ulcer Left Heel  -     Wound Procedure Treatment    Diabetic ulcer of left foot associated with type 2 diabetes mellitus, with fat layer exposed, unspecified part of foot (HCC)  -     lidocaine (LMX) 4 % cream  -     Wound cleansing and dressings; Future  -     Wound Procedure Treatment Diabetic Ulcer Left Heel  -     Wound Procedure Treatment  -     Debridement Diabetic Ulcer Left;Lateral Plantar  -     Debridement Diabetic Ulcer Anterior;Left Toe D2, second              Debridement   Wound 08/15/24 Diabetic Ulcer Heel Left    Universal Protocol:  procedure performed by consultantConsent: Verbal consent obtained.  Consent given by: patient  Time out: Immediately prior to procedure a \"time out\" was called to verify the correct patient, procedure, equipment, support staff and " "site/side marked as required.  Timeout called at: 10/24/2024 10:20 AM.  Patient understanding: patient states understanding of the procedure being performed  Patient identity confirmed: verbally with patient    Debridement Details  Performed by: physician  Debridement type: surgical  Level of debridement: subcutaneous tissue  Pain control: lidocaine 4%      Post-debridement measurements  Length (cm): 1.4  Width (cm): 3.6  Depth (cm): 0.4  Percent debrided: 100%  Surface Area (cm^2): 5.04  Area Debrided (cm^2): 5.04  Volume (cm^3): 2.02    Tissue and other material debrided: subcutaneous tissue  Devitalized tissue debrided: exudate and slough  Instrument(s) utilized: curette  Bleeding: small  Hemostasis obtained with: pressure  Response to treatment: procedure was tolerated well    Debridement   Wound 08/15/24 Diabetic Ulcer Plantar Left;Lateral    Universal Protocol:  procedure performed by consultantConsent: Verbal consent obtained.  Consent given by: patient  Time out: Immediately prior to procedure a \"time out\" was called to verify the correct patient, procedure, equipment, support staff and site/side marked as required.  Timeout called at: 10/24/2024 10:20 AM.  Patient understanding: patient states understanding of the procedure being performed  Patient identity confirmed: verbally with patient    Debridement Details  Performed by: physician  Debridement type: surgical  Level of debridement: subcutaneous tissue  Pain control: lidocaine 4%      Post-debridement measurements  Length (cm): 0.4  Width (cm): 0.4  Depth (cm): 0.4  Percent debrided: 100%  Surface Area (cm^2): 0.16  Area Debrided (cm^2): 0.16  Volume (cm^3): 0.06    Tissue and other material debrided: subcutaneous tissue  Devitalized tissue debrided: exudate and slough  Instrument(s) utilized: curette  Bleeding: small  Hemostasis obtained with: pressure  Response to treatment: procedure was tolerated well    Debridement   Wound 08/15/24 Diabetic Ulcer " "Toe D2, second Anterior;Left    Universal Protocol:  procedure performed by consultantConsent: Verbal consent obtained.  Consent given by: patient  Time out: Immediately prior to procedure a \"time out\" was called to verify the correct patient, procedure, equipment, support staff and site/side marked as required.  Timeout called at: 10/24/2024 10:20 AM.  Patient understanding: patient states understanding of the procedure being performed  Patient identity confirmed: verbally with patient    Debridement Details  Performed by: physician  Debridement type: surgical  Level of debridement: subcutaneous tissue  Pain control: lidocaine 4%      Post-debridement measurements  Length (cm): 0.5  Width (cm): 0.5  Depth (cm): 0.3  Percent debrided: 100%  Surface Area (cm^2): 0.25  Area Debrided (cm^2): 0.25  Volume (cm^3): 0.08    Tissue and other material debrided: subcutaneous tissue  Devitalized tissue debrided: exudate and slough  Instrument(s) utilized: curette  Bleeding: small  Hemostasis obtained with: pressure  Response to treatment: procedure was tolerated well        Plan:  Toe and lateral foot wound are slightly improved.  Heel wound is essentially unchanged  Wounds debrided as above  Continue wound management with silver alginate on the heel wound but changed to Puracol Ag and hydrogel to the toe and lateral foot wounds.  See wound orders below  Again discussed the importance of tight diabetic control and proper offloading  Patient will likely start HBO on Monday  Follow-up in 1 week or call sooner with questions or concerns    Wound 08/15/24 Diabetic Ulcer Heel Left (Active)   Enter Sumner score: Sumner Grade 3: Deep abscess, OM, or joint sepsis 10/24/24 1021   Wound Image Images linked 10/24/24 1050   Wound Description Pink;Yellow 10/24/24 1021   Clara-wound Assessment Callus 10/24/24 1021   Wound Length (cm) 1.4 cm 10/24/24 1021   Wound Width (cm) 3.6 cm 10/24/24 1021   Wound Depth (cm) 0.3 cm 10/24/24 1021   Wound " Surface Area (cm^2) 5.04 cm^2 10/24/24 1021   Wound Volume (cm^3) 1.512 cm^3 10/24/24 1021   Calculated Wound Volume (cm^3) 1.51 cm^3 10/24/24 1021   Change in Wound Size % -738.89 10/24/24 1021   Number of underminings 1 10/24/24 1021   Undermining 1 0.4 10/24/24 1021   Undermining 1 is depth extending from 11-1 o'clock 10/24/24 1021   Drainage Amount Moderate 10/24/24 1021   Drainage Description Serosanguineous 10/24/24 1021   Non-staged Wound Description Full thickness 10/24/24 1021   Dressing Status Intact (upon arrival) 10/24/24 1021       Wound 08/15/24 Diabetic Ulcer Plantar Left;Lateral (Active)   Enter Sumner score: Sumner Grade 2: Deep ulcer extended to ligament, tendon, joint capsule, bone, or deep fascia without abscess or osteomyelitis (OM) 10/24/24 1019   Wound Image Images linked 10/24/24 1049   Wound Description Dry;Brown 10/24/24 1019   Clara-wound Assessment Callus 10/24/24 1019   Wound Length (cm) 0.4 cm 10/24/24 1019   Wound Width (cm) 0.4 cm 10/24/24 1019   Wound Depth (cm) 0.1 cm (0.4 depth post debridement.) 10/24/24 1019   Wound Surface Area (cm^2) 0.16 cm^2 10/24/24 1019   Wound Volume (cm^3) 0.016 cm^3 10/24/24 1019   Calculated Wound Volume (cm^3) 0.02 cm^3 10/24/24 1019   Change in Wound Size % 75 10/24/24 1019   Drainage Amount None 10/24/24 1019   Non-staged Wound Description Full thickness 10/24/24 1019   Dressing Status Intact (upon arrival) 10/24/24 1019       Wound 08/15/24 Diabetic Ulcer Toe D2, second Anterior;Left (Active)   Enter Sumner score: Sumner Grade 3: Deep abscess, OM, or joint sepsis 10/24/24 1017   Wound Image Images linked 10/24/24 1049   Wound Description Dry;Brown 10/24/24 1017   Clara-wound Assessment Dry;Pink;Scaly 10/24/24 1017   Wound Length (cm) 0.5 cm 10/24/24 1017   Wound Width (cm) 0.5 cm 10/24/24 1017   Wound Depth (cm) 0.1 cm (0.3 post debridement) 10/24/24 1017   Wound Surface Area (cm^2) 0.25 cm^2 10/24/24 1017   Wound Volume (cm^3) 0.025 cm^3 10/24/24  1017   Calculated Wound Volume (cm^3) 0.03 cm^3 10/24/24 1017   Change in Wound Size % 92.86 10/24/24 1017   Drainage Amount None 10/24/24 1017   Non-staged Wound Description Full thickness 10/24/24 1017   Dressing Status Intact (upon arrival) 10/24/24 1017       Wound 08/15/24 Diabetic Ulcer Heel Left (Active)   Date First Assessed/Time First Assessed: 08/15/24 0943   Primary Wound Type: Diabetic Ulcer  Location: Heel  Wound Location Orientation: Left       Wound 08/15/24 Diabetic Ulcer Plantar Left;Lateral (Active)   Date First Assessed/Time First Assessed: 08/15/24 0943   Primary Wound Type: Diabetic Ulcer  Location: Plantar  Wound Location Orientation: Left;Lateral       Wound 08/15/24 Diabetic Ulcer Toe D2, second Anterior;Left (Active)   Date First Assessed/Time First Assessed: 08/15/24 0943   Primary Wound Type: Diabetic Ulcer  Location: Toe D2, second  Wound Location Orientation: Anterior;Left       [REMOVED] Wound 12/21/23 Diabetic Ulcer Ankle Posterior;Right (Removed)   Resolved Date: 08/15/24  Date First Assessed/Time First Assessed: 12/21/23 0954   Primary Wound Type: Diabetic Ulcer  Location: Ankle  Wound Location Orientation: Posterior;Right  Wound Description (Comments): MACDONALD 2  Wound Outcome: Unknown (No long...       [REMOVED] Wound 08/07/24 Groin Left (Removed)   Resolved Date: 08/22/24  Date First Assessed/Time First Assessed: 08/07/24 0927   Location: Groin  Wound Location Orientation: Left  Incision's 1st Dressing: ADHESIVE SKIN HIGH VISCOSITY EXOFIN PRECISION PEN (x0)  Wound Outcome: Unknown (No longer pre...       [REMOVED] Wound 08/30/24 Leg Left (Removed)   Resolved Date: 09/30/24  Date First Assessed/Time First Assessed: 08/30/24 1124   Location: Leg  Wound Location Orientation: Left  Wound Description (Comments): UPPER LEG AND LOWER LEG- MEPILEX  Incision's 1st Dressing: DRESSING MEPILEX AG BORDER POST...       [REMOVED] Wound 09/06/24 Pretibial Left;Proximal (Removed)   Resolved Date:  09/30/24  Date First Assessed/Time First Assessed: 09/06/24 1751   Location: Pretibial  Wound Location Orientation: Left;Proximal  Wound Outcome: (c) Other (Comment)       [REMOVED] Wound 09/06/24 Thigh Anterior;Left (Removed)   Resolved Date: 09/16/24  Date First Assessed/Time First Assessed: 09/06/24 1752   Location: Thigh  Wound Location Orientation: Anterior;Left  Wound Outcome: (c) Other (Comment)       [REMOVED] Wound 09/06/24 Ankle Anterior;Left (Removed)   Resolved Date: 09/09/24  Date First Assessed/Time First Assessed: 09/06/24 1804   Location: Ankle  Wound Location Orientation: Anterior;Left  Wound Outcome: Unknown (No longer present)       [REMOVED] Wound 09/06/24 Toe D2, second Anterior;Left (Removed)   Resolved Date: 09/09/24  Date First Assessed/Time First Assessed: 09/06/24 1804   Location: Toe D2, second  Wound Location Orientation: Anterior;Left  Wound Outcome: Unknown (No longer present)       [REMOVED] Wound 09/06/24 Pedal Anterior;Left (Removed)   Resolved Date: 09/09/24  Date First Assessed/Time First Assessed: 09/06/24 1805   Location: Pedal  Wound Location Orientation: Anterior;Left  Wound Outcome: Unknown (No longer present)       Subjective:      .    Patient presents for multiple left foot DFU's.  No increased pain or drainage.  Has been using Acticoat on the toe DFU and silver alginate on the midfoot and heel.  Patient using a heel relief shoe.  He is hoping to start HBO soon, had consult last week.        The following portions of the patient's history were reviewed and updated as appropriate: He  has a past medical history of Anemia, CAD (coronary artery disease), Callus, Cancer ([facility]), CHF (congestive heart failure) ([facility]), Chronic kidney disease, Clotting disorder (HCC), Coronary artery disease (1988), Deep vein thrombosis (HCC), Diabetes mellitus (HCC), Difficulty walking, Duodenal ulcer, Heart disease (1988), Hyperlipidemia, Hypertension, Myocardial infarction (HCC), Neuropathy,  Neuropathy in diabetes ([facility]), Plantar fasciitis, and Sleep apnea.  He   Patient Active Problem List    Diagnosis Date Noted    Foot ulcer ([facility]) 09/07/2024    SOB (shortness of breath) 09/06/2024    Nausea 09/06/2024    Elevated troponin 09/06/2024    History of DVT (deep vein thrombosis) 08/26/2024    Diabetic ulcer of toe of left foot associated with type 2 diabetes mellitus, limited to breakdown of skin ([facility]) 08/08/2024    Plantar fasciitis, right 07/10/2024    Acute deep vein thrombosis (DVT) of left peroneal vein ([facility]) 05/01/2024    Iron deficiency anemia 04/23/2024    Shortness of breath 04/09/2024    History of colon polyps 02/22/2024    Duodenal ulcer 02/01/2024    Multiple gastric ulcers 12/27/2023    Iron deficiency anemia due to chronic blood loss 12/27/2023    Melena 12/26/2023    Symptomatic anemia 12/26/2023    Frequent PVCs 06/17/2023    Acute on chronic diastolic heart failure ([facility]) 06/16/2023    Stage 3b chronic kidney disease ([facility]) 06/16/2023    Diabetic ulcer of right midfoot associated with diabetes mellitus due to underlying condition, limited to breakdown of skin ([facility]) 06/15/2023    Hypertensive urgency 05/11/2023    Elevated troponin level not due myocardial infarction 05/11/2023    Stable angina pectoris ([facility]) 03/07/2023    Chronic kidney disease-mineral and bone disorder 11/30/2022    Mild aortic stenosis 11/11/2022    Chronic HFpEF/Moderate pHTN (HFpEF) ([facility]) 11/10/2022    Gross hematuria 07/26/2022    Platelets decreased ([facility]) 07/22/2022    Acute kidney injury superimposed on chronic kidney disease  ([facility]) 02/16/2022    Actinic keratoses 01/14/2022    Type 2 diabetes mellitus with diabetic peripheral angiopathy without gangrene, with long-term current use of insulin ([facility]) 01/11/2022    Varicose veins of left lower extremity 06/25/2021    History of endovascular stent graft for abdominal aortic aneurysm (AAA) 06/25/2021    Bruit (arterial) 06/25/2021    Peripheral artery disease ([facility])  06/25/2021    Type 2 diabetes mellitus with diabetic polyneuropathy, with long-term current use of insulin (HCC) 06/10/2021    Mixed hyperlipidemia 05/11/2021    Primary hypertension 05/11/2021    Coronary artery disease involving native coronary artery of native heart without angina pectoris 05/11/2021    S/P angioplasty with stent 05/11/2021    Hx of CABG 05/11/2021    S/P AAA repair 05/11/2021    S/P prostatectomy 05/11/2021    H/O prostate cancer 05/11/2021     He  reports that he quit smoking about 36 years ago. His smoking use included cigarettes. He started smoking about 68 years ago. He has a 49.5 pack-year smoking history. He has been exposed to tobacco smoke. He has never used smokeless tobacco. He reports that he does not currently use alcohol after a past usage of about 5.0 standard drinks of alcohol per week. He reports that he does not use drugs.  He is allergic to lisinopril..    Review of Systems   Constitutional:  Negative for chills and fever.   HENT:  Negative for congestion and sneezing.    Respiratory:  Negative for cough.    Cardiovascular:  Positive for leg swelling.   Musculoskeletal:  Positive for gait problem.   Skin:  Positive for wound.   Psychiatric/Behavioral:  Negative for agitation.          Objective:       Wound 08/15/24 Diabetic Ulcer Heel Left (Active)   Enter Sumner score: Sumner Grade 3: Deep abscess, OM, or joint sepsis 10/24/24 1021   Wound Image Images linked 10/24/24 1050   Wound Description Pink;Yellow 10/24/24 1021   Clara-wound Assessment Callus 10/24/24 1021   Wound Length (cm) 1.4 cm 10/24/24 1021   Wound Width (cm) 3.6 cm 10/24/24 1021   Wound Depth (cm) 0.3 cm 10/24/24 1021   Wound Surface Area (cm^2) 5.04 cm^2 10/24/24 1021   Wound Volume (cm^3) 1.512 cm^3 10/24/24 1021   Calculated Wound Volume (cm^3) 1.51 cm^3 10/24/24 1021   Change in Wound Size % -738.89 10/24/24 1021   Number of underminings 1 10/24/24 1021   Undermining 1 0.4 10/24/24 1021   Undermining 1 is  depth extending from 11-1 o'clock 10/24/24 1021   Drainage Amount Moderate 10/24/24 1021   Drainage Description Serosanguineous 10/24/24 1021   Non-staged Wound Description Full thickness 10/24/24 1021   Dressing Status Intact (upon arrival) 10/24/24 1021       Wound 08/15/24 Diabetic Ulcer Plantar Left;Lateral (Active)   Enter Sumner score: Sumner Grade 2: Deep ulcer extended to ligament, tendon, joint capsule, bone, or deep fascia without abscess or osteomyelitis (OM) 10/24/24 1019   Wound Image Images linked 10/24/24 1049   Wound Description Dry;Brown 10/24/24 1019   Clara-wound Assessment Callus 10/24/24 1019   Wound Length (cm) 0.4 cm 10/24/24 1019   Wound Width (cm) 0.4 cm 10/24/24 1019   Wound Depth (cm) 0.1 cm (0.4 depth post debridement.) 10/24/24 1019   Wound Surface Area (cm^2) 0.16 cm^2 10/24/24 1019   Wound Volume (cm^3) 0.016 cm^3 10/24/24 1019   Calculated Wound Volume (cm^3) 0.02 cm^3 10/24/24 1019   Change in Wound Size % 75 10/24/24 1019   Drainage Amount None 10/24/24 1019   Non-staged Wound Description Full thickness 10/24/24 1019   Dressing Status Intact (upon arrival) 10/24/24 1019       Wound 08/15/24 Diabetic Ulcer Toe D2, second Anterior;Left (Active)   Enter Sumner score: Sumner Grade 3: Deep abscess, OM, or joint sepsis 10/24/24 1017   Wound Image Images linked 10/24/24 1049   Wound Description Dry;Brown 10/24/24 1017   Clara-wound Assessment Dry;Pink;Scaly 10/24/24 1017   Wound Length (cm) 0.5 cm 10/24/24 1017   Wound Width (cm) 0.5 cm 10/24/24 1017   Wound Depth (cm) 0.1 cm (0.3 post debridement) 10/24/24 1017   Wound Surface Area (cm^2) 0.25 cm^2 10/24/24 1017   Wound Volume (cm^3) 0.025 cm^3 10/24/24 1017   Calculated Wound Volume (cm^3) 0.03 cm^3 10/24/24 1017   Change in Wound Size % 92.86 10/24/24 1017   Drainage Amount None 10/24/24 1017   Non-staged Wound Description Full thickness 10/24/24 1017   Dressing Status Intact (upon arrival) 10/24/24 1017       /74   Pulse 81    Temp 97.8 °F (36.6 °C)   Resp 15     Physical Exam        Wound 08/15/24 Diabetic Ulcer Heel Left (Active)   Enter Sumner score: Sumner Grade 3: Deep abscess, OM, or joint sepsis 10/24/24 1021   Wound Image   10/24/24 1050   Wound Description Pink;Yellow 10/24/24 1021   Clara-wound Assessment Callus 10/24/24 1021   Wound Length (cm) 1.4 cm 10/24/24 1021   Wound Width (cm) 3.6 cm 10/24/24 1021   Wound Depth (cm) 0.3 cm 10/24/24 1021   Wound Surface Area (cm^2) 5.04 cm^2 10/24/24 1021   Wound Volume (cm^3) 1.512 cm^3 10/24/24 1021   Calculated Wound Volume (cm^3) 1.51 cm^3 10/24/24 1021   Change in Wound Size % -738.89 10/24/24 1021   Number of underminings 1 10/24/24 1021   Undermining 1 0.4 10/24/24 1021   Undermining 1 is depth extending from 11-1 o'clock 10/24/24 1021   Drainage Amount Moderate 10/24/24 1021   Drainage Description Serosanguineous 10/24/24 1021   Non-staged Wound Description Full thickness 10/24/24 1021   Dressing Status Intact 10/24/24 1021       Wound 08/15/24 Diabetic Ulcer Plantar Left;Lateral (Active)   Enter Sumner score: Sumner Grade 2: Deep ulcer extended to ligament, tendon, joint capsule, bone, or deep fascia without abscess or osteomyelitis (OM) 10/24/24 1019   Wound Image   10/24/24 1049   Wound Description Dry;Brown 10/24/24 1019   Clara-wound Assessment Callus 10/24/24 1019   Wound Length (cm) 0.4 cm 10/24/24 1019   Wound Width (cm) 0.4 cm 10/24/24 1019   Wound Depth (cm) 0.1 cm 10/24/24 1019   Wound Surface Area (cm^2) 0.16 cm^2 10/24/24 1019   Wound Volume (cm^3) 0.016 cm^3 10/24/24 1019   Calculated Wound Volume (cm^3) 0.02 cm^3 10/24/24 1019   Change in Wound Size % 75 10/24/24 1019   Drainage Amount None 10/24/24 1019   Drainage Description Serous 10/15/24 0825   Non-staged Wound Description Full thickness 10/24/24 1019   Dressing Status Intact 10/24/24 1019       Wound 08/15/24 Diabetic Ulcer Toe D2, second Anterior;Left (Active)   Enter Sumner score: Sumner Grade 3: Deep  abscess, OM, or joint sepsis 10/24/24 1017   Wound Image   10/24/24 1049   Wound Description Dry;Brown 10/24/24 1017   Clara-wound Assessment Dry;Pink;Scaly 10/24/24 1017   Wound Length (cm) 0.5 cm 10/24/24 1017   Wound Width (cm) 0.5 cm 10/24/24 1017   Wound Depth (cm) 0.1 cm 10/24/24 1017   Wound Surface Area (cm^2) 0.25 cm^2 10/24/24 1017   Wound Volume (cm^3) 0.025 cm^3 10/24/24 1017   Calculated Wound Volume (cm^3) 0.03 cm^3 10/24/24 1017   Change in Wound Size % 92.86 10/24/24 1017   Number of underminings 1 09/30/24 0906   Undermining 1 0 10/15/24 0829   Undermining 1 is depth extending from 12-12 10/07/24 1112   Drainage Amount None 10/24/24 1017   Drainage Description Yellow 10/15/24 0829   Non-staged Wound Description Full thickness 10/24/24 1017   Dressing Status Intact 10/24/24 1017                       Wound Instructions:  Orders Placed This Encounter   Procedures    Wound cleansing and dressings     LEFT HEEL WOUND:      Hold santyl, do not discard. Store per manufacturers recommendations      Wash your hands with soap and water. Remove old dressing, discard into plastic bag and place in trash. Cleanse the wound with dakins moistened gauze prior to applying a clean dressing. Do not use tissue or cotton balls. Do not scrub the wound. Pat dry using gauze.      Shower no, unless able to keep wounds dry.      Apply lotion to skin surrounding wound   Apply Silver alginate to the open wound.   Cover with abd (gauze to toe wound)   Secure with rolled gauze and tape      Change dressing 3 times weekly      LEFT TOE AND LEFT LATERAL FOOT WOUND:     Wash your hands with soap and water.  Remove old dressing, discard into plastic bag and place in trash.  Cleanse the wound with dakins moistened gauze prior to applying a clean dressing. Do not use tissue or cotton balls. Do not scrub the wound. Pat dry using gauze.  Shower no, unless able to keep wounds dry     Apply Puracol AG (collagen) to the open wound and put a  drop of Hydrogel on the collagen.  Cover with gauze  Secure with tape  Change dressing 3 times weekly             Off-loading Instructions: Wear off-loading device as directed by your physician (Globo Ped). Put on immediately when rising in the morning and remove when going to bed and Turn every 2 hours. Avoid position directing pressure to Wound site. Limit side lying to 30 degree tilt. Limit the head of bed elevation to 30 degrees. Limit walking and standing to only necessity for activities of daily living.      Wound infection: If you have signs of infection please call the wound center. If the wound center is closedplease go to the Emergency department. Some signs of infection: fever, chills, increased redness, red streaks, increase in pain, increased drainage. Drainage with an odor, Change in drainage color: white/milky/green/tan/yellow, an increase in swelling, chest pain and/or shortness of breath.     Protein: Eat protein with each meal to promote healing. Examples of protein are fish, meat, chicken, nuts, peanut butter, eggs, lentils, edamame or a protein shake.      Continue with [facility] for wound care, Follow instructions for care, do not use bordered foam     Standing Status:   Future     Standing Expiration Date:   10/31/2024    Wound Procedure Treatment Diabetic Ulcer Left Heel     This order was created via procedure documentation    Wound Procedure Treatment     This order was created via procedure documentation    Debridement Diabetic Ulcer Left Heel     This order was created via procedure documentation    Debridement Diabetic Ulcer Left;Lateral Plantar     This order was created via procedure documentation    Debridement Diabetic Ulcer Anterior;Left Toe D2, second     This order was created via procedure documentation        Diagnosis ICD-10-CM Associated Orders   1. Diabetic ulcer of left foot associated with type 2 diabetes mellitus, with muscle involvement without evidence of  necrosis, unspecified part of foot ([facility])  E11.621 lidocaine (LMX) 4 % cream    L97.525 Wound cleansing and dressings     Wound Procedure Treatment Diabetic Ulcer Left Heel     Wound Procedure Treatment     Debridement Diabetic Ulcer Left Heel      2. Peripheral artery disease ([facility])  I73.9 lidocaine (LMX) 4 % cream     Wound cleansing and dressings     Wound Procedure Treatment Diabetic Ulcer Left Heel     Wound Procedure Treatment      3. Type 2 diabetes mellitus with diabetic peripheral angiopathy without gangrene, with long-term current use of insulin ([facility])  E11.51 lidocaine (LMX) 4 % cream    Z79.4 Wound cleansing and dressings     Wound Procedure Treatment Diabetic Ulcer Left Heel     Wound Procedure Treatment      4. Diabetic ulcer of left foot associated with type 2 diabetes mellitus, with fat layer exposed, unspecified part of foot ([facility])  E11.621 lidocaine (LMX) 4 % cream    L97.522 Wound cleansing and dressings     Wound Procedure Treatment Diabetic Ulcer Left Heel     Wound Procedure Treatment     Debridement Diabetic Ulcer Left;Lateral Plantar     Debridement Diabetic Ulcer Anterior;Left Toe D2, second

## 2024-10-24 NOTE — LETTER
[facility]  [address] [address]  Phone#  [phone]  Fax#  [phone]    Patient:  Thomas Horne  YOB: 1945  Phone:  [phone]  Date of Visit:  10/24/2024    Orders Placed This Encounter   Procedures   • Wound cleansing and dressings     LEFT HEEL WOUND:      Hold santyl, do not discard. Store per manufacturers recommendations      Wash your hands with soap and water. Remove old dressing, discard into plastic bag and place in trash. Cleanse the wound with dakins moistened gauze prior to applying a clean dressing. Do not use tissue or cotton balls. Do not scrub the wound. Pat dry using gauze.      Shower no, unless able to keep wounds dry.      Apply lotion to skin surrounding wound   Apply Silver alginate to the open wound.   Cover with abd (gauze to toe wound)   Secure with rolled gauze and tape      Change dressing 3 times weekly      LEFT TOE AND LEFT LATERAL FOOT WOUND:     Wash your hands with soap and water.  Remove old dressing, discard into plastic bag and place in trash.  Cleanse the wound with dakins moistened gauze prior to applying a clean dressing. Do not use tissue or cotton balls. Do not scrub the wound. Pat dry using gauze.  Shower no, unless able to keep wounds dry     Apply Puracol AG (collagen) to the open wound and put a drop of Hydrogel on the collagen.  Cover with gauze  Secure with tape  Change dressing 3 times weekly             Off-loading Instructions: Wear off-loading device as directed by your physician (Globo Ped). Put on immediately when rising in the morning and remove when going to bed and Turn every 2 hours. Avoid position directing pressure to Wound site. Limit side lying to 30 degree tilt. Limit the head of bed elevation to 30 degrees. Limit walking and standing to only necessity for activities of daily living.      Wound infection: If you have signs of infection please call the wound center. If the wound  center is closedplease go to the Emergency department. Some signs of infection: fever, chills, increased redness, red streaks, increase in pain, increased drainage. Drainage with an odor, Change in drainage color: white/milky/green/tan/yellow, an increase in swelling, chest pain and/or shortness of breath.     Protein: Eat protein with each meal to promote healing. Examples of protein are fish, meat, chicken, nuts, peanut butter, eggs, lentils, edamame or a protein shake.      Continue with [facility] for wound care, Follow instructions for care, do not use bordered foam     Standing Status:   Future     Standing Expiration Date:   10/31/2024   • Wound Procedure Treatment Diabetic Ulcer Left Heel     This order was created via procedure documentation   • Wound Procedure Treatment     This order was created via procedure documentation   • Debridement     This order was created via procedure documentation   • Debridement     This order was created via procedure documentation   • Debridement     This order was created via procedure documentation         Electronically signed by Nitza Sotelo DO

## 2024-10-24 NOTE — PROGRESS NOTES
Wound Procedure Treatment Diabetic Ulcer Left Heel    Performed by: Graciela Philip RN  Authorized by: Nitza Sotelo DO    Associated wounds:   Wound 08/15/24 Diabetic Ulcer Heel Left  Wound cleansed with:  NSS  Applied to periwound:  Moisture lotion  Applied primary dressing:  Calcium alginate and Silver  Applied secondary dressing:  ABD  Dressing secured with:  Ender and Tape  Wound Procedure Treatment    Performed by: Graciela Philip RN  Authorized by: Nitza Sotelo DO    Associated wounds:   Wound 08/15/24 Diabetic Ulcer Plantar Left;Lateral  Wound 08/15/24 Diabetic Ulcer Toe D2, second Anterior;Left  Wound cleansed with:  NSS  Applied to periwound:  Moisture lotion  Applied primary dressing:  Collagen dressing, Silver and Other  Applied secondary dressing:  Gauze  Dressing secured with:  Ender and Tape  Comments:  Hydrogel applied to collagen

## 2024-10-24 NOTE — PROGRESS NOTES
Chart reviewed. Patient had PCP apt on 10/17 since last outreach. Patient had ear cerumen removal from left ear. No medication changes. Patient follows at [facility] for HBO of diabetic left foot ulcer.    This RNCM called patient for follow up. There was no answer and a message was left with a request for a call back.    RNCM to follow.

## 2024-10-24 NOTE — PROGRESS NOTES
Received documentation from [facility].  The artifical urinary sphinter that was placed is a  filled with saline.  Call placed to [facility] and spoke with Jaci who stated there is no testing done for this product and Hyperbaric treatment.  She did confirm that the pump is manual and does not have any electrodes or electronic function.  Will have MD evaluate the documentation and make patient aware of her decision at today's wound care visit.

## 2024-10-28 ENCOUNTER — OFFICE VISIT (OUTPATIENT)
Dept: WOUND CARE | Facility: HOSPITAL | Age: 79
End: 2024-10-28
Payer: COMMERCIAL

## 2024-10-28 ENCOUNTER — TELEPHONE (OUTPATIENT)
Age: 79
End: 2024-10-28

## 2024-10-28 VITALS
TEMPERATURE: 97.8 F | DIASTOLIC BLOOD PRESSURE: 73 MMHG | RESPIRATION RATE: 18 BRPM | HEART RATE: 63 BPM | SYSTOLIC BLOOD PRESSURE: 152 MMHG

## 2024-10-28 DIAGNOSIS — E11.621 DIABETIC ULCER OF LEFT FOOT ASSOCIATED WITH TYPE 2 DIABETES MELLITUS, WITH MUSCLE INVOLVEMENT WITHOUT EVIDENCE OF NECROSIS, UNSPECIFIED PART OF FOOT (HCC): Primary | ICD-10-CM

## 2024-10-28 DIAGNOSIS — L97.525 DIABETIC ULCER OF LEFT FOOT ASSOCIATED WITH TYPE 2 DIABETES MELLITUS, WITH MUSCLE INVOLVEMENT WITHOUT EVIDENCE OF NECROSIS, UNSPECIFIED PART OF FOOT (HCC): Primary | ICD-10-CM

## 2024-10-28 LAB
GLUCOSE SERPL-MCNC: 195 MG/DL (ref 65–140)
GLUCOSE SERPL-MCNC: 216 MG/DL (ref 65–140)

## 2024-10-28 PROCEDURE — 82948 REAGENT STRIP/BLOOD GLUCOSE: CPT | Performed by: FAMILY MEDICINE

## 2024-10-28 PROCEDURE — G0277 HBOT, FULL BODY CHAMBER, 30M: HCPCS | Performed by: FAMILY MEDICINE

## 2024-10-28 PROCEDURE — 99183 HYPERBARIC OXYGEN THERAPY: CPT | Performed by: FAMILY MEDICINE

## 2024-10-28 NOTE — TELEPHONE ENCOUNTER
Caller: pt    Doctor: john    Reason for call: pt wants to know if he should continue taking the lyrica and if he should, should it be a smaller amount?    Call back#: [phone]

## 2024-10-28 NOTE — TELEPHONE ENCOUNTER
S/w the patient to inquire. He stated he has been doing pretty good on the increase to 150mg TID of the Lyrica. He stated it does make him tired at times but otherwise, no problems. He stated he currently has no pain. He is wondering if he should stay on this dose or decrease back to a 100 mg. Reviewed that he has no pain currently and he has been on the 150 mg dose since 10/10. He is wondering what you think? Should he stay on this new dose or decrease down?

## 2024-10-28 NOTE — PROGRESS NOTES
HBO Treatment Course Details       Treatment Notes: Treatment tolerated well.  No complaints of pain or discomfort.  He did state it felt like he had fluid in his ear.  MD aware and Flonase was suggested.  Treatment changed as ordered to left foot.  No signs of infection.  Moderated serosang drainage.     Treatment Course Number: 1  Total Treatments Ordered:  40     Diagnosis:   1. Diabetic ulcer of left foot associated with type 2 diabetes mellitus, with muscle involvement without evidence of necrosis, unspecified part of foot (HCC)  Hyperbaric oxygen thearpy    Hyperbaric oxygen [facility]          HBO Treatment Details:  In-Patient Visit: no  Treatment Length: (Minutes)  Chamber #: Hard sided Monoplace Chamber    Pre-Treatment details:  Pre-treatment protocol Treatment Protocol: 2.5 INDY X 90 minutes w/ 100% oxygen, two 5 minute air breaks  Left ear clear?: yes  Right ear clear?: yes  Left ear intact?: yes  Right ear intact?: yes                    Left ear TEED scale: Grade 0  Right ear TEED scale: Grade 0   Pretreatment heart and lung assessment: Pretreatment heart and lung auscltation unremarkable. Patient cleared for HBOT     Treatment details:  INDY Rate: 2.5  Started Compression: 0845  Reached Compression: 0859 (Difficulty clearing ears at 1.5 INDY, paused desent and patient was able to clear his ear by swallowing multiple times)  Total Compression Time: 14 (Minutes)  Total Holding Time: 100 (Minutes)  Started Decompression: 1039  Reached Surface: 1053  Total Decompression Time: 14 (Minutes)  Total Airbreaks:   (Minutes)  Total Time of Treatment: 128 (Minutes)  Symptoms Noted During Treatment: None (Minutes)    Post treatment details:  Left ear clear?: yes  Right ear clear?: yes  Left ears intact?: yes  Right ears intact?: yes                    Left ear TEED scale: Grade I  Right ear TEED scale: Grade II  Post treatment heart and lung assessment: Post treatment heart and lung auscltation unremarkable. Patient  cleared for discharge. Tolerated treatment well.             Vital Signs:  [facility] Glucose Reference Range: 100-350 mg/dl   Pre-Treatment Post-Treatment   Time vitals are taken: 0815 Time vitals are taken: 1100   Blood Pressure: 152/73 Blood Pressure: 170/73   Pulse: 63 Pulse: 60   Resp: 18 Resp: 18   Temp: 97.8 °F (36.6 °C) Temp: 97.6 °F (36.4 °C)   Pre-Inspection Glucose readin Post-Inspection Glucose readin       Allergies   Allergen Reactions    Lisinopril Rash and Lip Swelling     Patient Active Problem List    Diagnosis Date Noted    Foot ulcer (HCC) 2024    SOB (shortness of breath) 2024    Nausea 2024    Elevated troponin 2024    History of DVT (deep vein thrombosis) 2024    Diabetic ulcer of toe of left foot associated with type 2 diabetes mellitus, limited to breakdown of skin ([facility]) 2024    Plantar fasciitis, right 07/10/2024    Acute deep vein thrombosis (DVT) of left peroneal vein ([facility]) 2024    Iron deficiency anemia 2024    Shortness of breath 2024    History of colon polyps 2024    Duodenal ulcer 2024    Multiple gastric ulcers 2023    Iron deficiency anemia due to chronic blood loss 2023    Melena 2023    Symptomatic anemia 2023    Frequent PVCs 2023    Acute on chronic diastolic heart failure ([facility]) 2023    Stage 3b chronic kidney disease ([facility]) 2023    Diabetic ulcer of right midfoot associated with diabetes mellitus due to underlying condition, limited to breakdown of skin ([facility]) 06/15/2023    Hypertensive urgency 2023    Elevated troponin level not due myocardial infarction 2023    Stable angina pectoris ([facility]) 2023    Chronic kidney disease-mineral and bone disorder 2022    Mild aortic stenosis 2022    Chronic HFpEF/Moderate pHTN (HFpEF) ([facility]) 11/10/2022    Gross hematuria 2022    Platelets decreased ([facility]) 2022    Acute kidney injury  superimposed on chronic kidney disease  (HCC) 02/16/2022    Actinic keratoses 01/14/2022    Type 2 diabetes mellitus with diabetic peripheral angiopathy without gangrene, with long-term current use of insulin ([facility]) 01/11/2022    Varicose veins of left lower extremity 06/25/2021    History of endovascular stent graft for abdominal aortic aneurysm (AAA) 06/25/2021    Bruit (arterial) 06/25/2021    Peripheral artery disease (HCC) 06/25/2021    Type 2 diabetes mellitus with diabetic polyneuropathy, with long-term current use of insulin ([facility]) 06/10/2021    Mixed hyperlipidemia 05/11/2021    Primary hypertension 05/11/2021    Coronary artery disease involving native coronary artery of native heart without angina pectoris 05/11/2021    S/P angioplasty with stent 05/11/2021    Hx of CABG 05/11/2021    S/P AAA repair 05/11/2021    S/P prostatectomy 05/11/2021    H/O prostate cancer 05/11/2021     Orders Placed This Encounter   Procedures    Hyperbaric oxygen thearpy     Standing Status:   Standing     Number of Occurrences:   40     Standing Expiration Date:   10/28/2025     Order Specific Question:   INDY Rate     Answer:   2.5 INDY for 90 Minutes with 2 five minute air breaks; 100% oxygen     Order Specific Question:   Descent and ascent rate     Answer:   Descent and ascent rate of up to 2 psi/min depending upon the patient's ability to clear ears and comfort     Order Specific Question:   Frequency     Answer:   5 x week     Order Specific Question:   Total number of treatments     Answer:   40

## 2024-10-29 ENCOUNTER — OFFICE VISIT (OUTPATIENT)
Dept: WOUND CARE | Facility: HOSPITAL | Age: 79
End: 2024-10-29
Payer: COMMERCIAL

## 2024-10-29 ENCOUNTER — TELEPHONE (OUTPATIENT)
Age: 79
End: 2024-10-29

## 2024-10-29 VITALS
SYSTOLIC BLOOD PRESSURE: 116 MMHG | HEART RATE: 60 BPM | TEMPERATURE: 97.3 F | RESPIRATION RATE: 18 BRPM | DIASTOLIC BLOOD PRESSURE: 66 MMHG

## 2024-10-29 DIAGNOSIS — L97.525 DIABETIC ULCER OF LEFT FOOT ASSOCIATED WITH TYPE 2 DIABETES MELLITUS, WITH MUSCLE INVOLVEMENT WITHOUT EVIDENCE OF NECROSIS, UNSPECIFIED PART OF FOOT (HCC): ICD-10-CM

## 2024-10-29 DIAGNOSIS — E11.621 DIABETIC ULCER OF LEFT FOOT ASSOCIATED WITH TYPE 2 DIABETES MELLITUS, WITH MUSCLE INVOLVEMENT WITHOUT EVIDENCE OF NECROSIS, UNSPECIFIED PART OF FOOT (HCC): ICD-10-CM

## 2024-10-29 LAB
GLUCOSE SERPL-MCNC: 195 MG/DL (ref 65–140)
GLUCOSE SERPL-MCNC: 232 MG/DL (ref 65–140)

## 2024-10-29 PROCEDURE — 82948 REAGENT STRIP/BLOOD GLUCOSE: CPT | Performed by: FAMILY MEDICINE

## 2024-10-29 PROCEDURE — G0277 HBOT, FULL BODY CHAMBER, 30M: HCPCS | Performed by: FAMILY MEDICINE

## 2024-10-29 PROCEDURE — 99183 HYPERBARIC OXYGEN THERAPY: CPT | Performed by: FAMILY MEDICINE

## 2024-10-29 NOTE — TELEPHONE ENCOUNTER
"Hello,    The following message was sent via e-mail to the leadership team:     Please advise if you can help facilitate the following overbook request:    Patient Name: Thomas Horne    Patient MRN: [id number]    Call back #: [phone]    Insurance: [employer]    Department:Cardiology    Speciality: General Cardiology    Reason for overbook request: PATIENT REQUEST    Comments (Write \"N/a\" if no comments): Patient has wound care treatments for next 30days at 830 am and will be unable to make scheduled appointment for 11/20/24. No follow up appointments available until July 2025 with an appropriate provider but this is a 4month follow up appointment. Please assist with scheduling.    Requested doctor and location: [place] or [facility]/ [place]    Date of current appointment: None available in needed time frame, Patient on wait list for any openings      Thank you.    "

## 2024-10-29 NOTE — PROGRESS NOTES
HBO Treatment Course Details       Treatment Notes: Treatment tolerated well.  No complaints of pain or discomfort.  Flonase was started yesterday.     Treatment Course Number: 2  Total Treatments Ordered:  40     Diagnosis:   1. Diabetic ulcer of left foot associated with type 2 diabetes mellitus, with muscle involvement without evidence of necrosis, unspecified part of foot (HCC)  [facility]          HBO Treatment Details:  In-Patient Visit: no  Treatment Length:90 Minutes(Minutes)  Chamber #: Hard sided Monoplace Chamber    Pre-Treatment details:  Pre-treatment protocol Treatment Protocol: 2.5 INDY X 90 minutes w/ 100% oxygen, two 5 minute air breaks  Left ear clear?: yes  Right ear clear?: yes  Left ear intact?: yes  Right ear intact?: yes                    Left ear TEED scale: Grade I  Right ear TEED scale: Grade I   Pretreatment heart and lung assessment: Pretreatment heart and lung auscltation unremarkable. Patient cleared for HBOT     Treatment details:  INDY Rate: 2.5  Started Compression: 0842  Reached Compression: 0859  Total Compression Time: 17 (Minutes)  Total Holding Time: 100 (Minutes)  Started Decompression: 1039  Reached Surface: 1056  Total Decompression Time: 17 (Minutes)  Total Airbreaks:   (Minutes)  Total Time of Treatment: 134 (Minutes)  Symptoms Noted During Treatment: None (Minutes)    Post treatment details:  Left ear clear?: yes  Right ear clear?: yes  Left ears intact?: yes  Right ears intact?: yes                    Left ear TEED scale: Grade I  Right ear TEED scale: Grade II  Post treatment heart and lung assessment: Post treatment heart and lung auscltation unremarkable. Patient cleared for discharge. Tolerated treatment well.             Vital Signs:  [facility] Glucose Reference Range: 100-350 mg/dl   Pre-Treatment Post-Treatment   Time vitals are taken: 0835 Time vitals are taken: 1100   Blood Pressure: 116/66 Blood Pressure: 113/60   Pulse: 60 Pulse: 58   Resp: 18 Resp: 18    Temp: 97.3 °F (36.3 °C) Temp: 96.8 °F (36 °C)   Pre-Inspection Glucose readin Post-Inspection Glucose readin       Allergies   Allergen Reactions    Lisinopril Rash and Lip Swelling     Patient Active Problem List    Diagnosis Date Noted    Foot ulcer (HCC) 2024    SOB (shortness of breath) 2024    Nausea 2024    Elevated troponin 2024    History of DVT (deep vein thrombosis) 2024    Diabetic ulcer of toe of left foot associated with type 2 diabetes mellitus, limited to breakdown of skin (HCC) 2024    Plantar fasciitis, right 07/10/2024    Acute deep vein thrombosis (DVT) of left peroneal vein ([facility]) 2024    Iron deficiency anemia 2024    Shortness of breath 2024    History of colon polyps 2024    Duodenal ulcer 2024    Multiple gastric ulcers 2023    Iron deficiency anemia due to chronic blood loss 2023    Melena 2023    Symptomatic anemia 2023    Frequent PVCs 2023    Acute on chronic diastolic heart failure (HCC) 2023    Stage 3b chronic kidney disease ([facility]) 2023    Diabetic ulcer of right midfoot associated with diabetes mellitus due to underlying condition, limited to breakdown of skin ([facility]) 06/15/2023    Hypertensive urgency 2023    Elevated troponin level not due myocardial infarction 2023    Stable angina pectoris ([facility]) 2023    Chronic kidney disease-mineral and bone disorder 2022    Mild aortic stenosis 2022    Chronic HFpEF/Moderate pHTN (HFpEF) ([facility]) 11/10/2022    Gross hematuria 2022    Platelets decreased ([facility]) 2022    Acute kidney injury superimposed on chronic kidney disease  ([facility]) 2022    Actinic keratoses 2022    Type 2 diabetes mellitus with diabetic peripheral angiopathy without gangrene, with long-term current use of insulin ([facility]) 2022    Varicose veins of left lower extremity 2021    History of endovascular  stent graft for abdominal aortic aneurysm (AAA) 06/25/2021    Bruit (arterial) 06/25/2021    Peripheral artery disease (HCC) 06/25/2021    Type 2 diabetes mellitus with diabetic polyneuropathy, with long-term current use of insulin (HCC) 06/10/2021    Mixed hyperlipidemia 05/11/2021    Primary hypertension 05/11/2021    Coronary artery disease involving native coronary artery of native heart without angina pectoris 05/11/2021    S/P angioplasty with stent 05/11/2021    Hx of CABG 05/11/2021    S/P AAA repair 05/11/2021    S/P prostatectomy 05/11/2021    H/O prostate cancer 05/11/2021     No orders of the defined types were placed in this encounter.

## 2024-10-30 ENCOUNTER — TELEPHONE (OUTPATIENT)
Dept: OTOLARYNGOLOGY | Facility: CLINIC | Age: 79
End: 2024-10-30

## 2024-10-30 ENCOUNTER — OFFICE VISIT (OUTPATIENT)
Dept: WOUND CARE | Facility: HOSPITAL | Age: 79
End: 2024-10-30
Payer: COMMERCIAL

## 2024-10-30 VITALS
RESPIRATION RATE: 18 BRPM | TEMPERATURE: 96.9 F | DIASTOLIC BLOOD PRESSURE: 70 MMHG | SYSTOLIC BLOOD PRESSURE: 129 MMHG | HEART RATE: 60 BPM

## 2024-10-30 VITALS
HEART RATE: 60 BPM | DIASTOLIC BLOOD PRESSURE: 74 MMHG | RESPIRATION RATE: 18 BRPM | TEMPERATURE: 97.8 F | SYSTOLIC BLOOD PRESSURE: 128 MMHG

## 2024-10-30 DIAGNOSIS — L97.525 DIABETIC ULCER OF LEFT FOOT ASSOCIATED WITH TYPE 2 DIABETES MELLITUS, WITH MUSCLE INVOLVEMENT WITHOUT EVIDENCE OF NECROSIS, UNSPECIFIED PART OF FOOT (HCC): Primary | ICD-10-CM

## 2024-10-30 DIAGNOSIS — L97.525 DIABETIC ULCER OF LEFT FOOT ASSOCIATED WITH TYPE 2 DIABETES MELLITUS, WITH MUSCLE INVOLVEMENT WITHOUT EVIDENCE OF NECROSIS, UNSPECIFIED PART OF FOOT (HCC): ICD-10-CM

## 2024-10-30 DIAGNOSIS — E11.621 DIABETIC ULCER OF LEFT FOOT ASSOCIATED WITH TYPE 2 DIABETES MELLITUS, WITH MUSCLE INVOLVEMENT WITHOUT EVIDENCE OF NECROSIS, UNSPECIFIED PART OF FOOT (HCC): Primary | ICD-10-CM

## 2024-10-30 DIAGNOSIS — Z79.4 TYPE 2 DIABETES MELLITUS WITH DIABETIC PERIPHERAL ANGIOPATHY WITHOUT GANGRENE, WITH LONG-TERM CURRENT USE OF INSULIN (HCC): ICD-10-CM

## 2024-10-30 DIAGNOSIS — I73.9 PERIPHERAL ARTERY DISEASE (HCC): ICD-10-CM

## 2024-10-30 DIAGNOSIS — E11.621 DIABETIC ULCER OF LEFT FOOT ASSOCIATED WITH TYPE 2 DIABETES MELLITUS, WITH MUSCLE INVOLVEMENT WITHOUT EVIDENCE OF NECROSIS, UNSPECIFIED PART OF FOOT (HCC): ICD-10-CM

## 2024-10-30 DIAGNOSIS — L97.522 DIABETIC ULCER OF LEFT FOOT ASSOCIATED WITH TYPE 2 DIABETES MELLITUS, WITH FAT LAYER EXPOSED, UNSPECIFIED PART OF FOOT (HCC): ICD-10-CM

## 2024-10-30 DIAGNOSIS — E11.621 DIABETIC ULCER OF LEFT FOOT ASSOCIATED WITH TYPE 2 DIABETES MELLITUS, WITH FAT LAYER EXPOSED, UNSPECIFIED PART OF FOOT (HCC): ICD-10-CM

## 2024-10-30 DIAGNOSIS — E11.51 TYPE 2 DIABETES MELLITUS WITH DIABETIC PERIPHERAL ANGIOPATHY WITHOUT GANGRENE, WITH LONG-TERM CURRENT USE OF INSULIN (HCC): ICD-10-CM

## 2024-10-30 LAB
GLUCOSE SERPL-MCNC: 159 MG/DL (ref 65–140)
GLUCOSE SERPL-MCNC: 258 MG/DL (ref 65–140)

## 2024-10-30 PROCEDURE — 99183 HYPERBARIC OXYGEN THERAPY: CPT | Performed by: FAMILY MEDICINE

## 2024-10-30 PROCEDURE — 11042 DBRDMT SUBQ TIS 1ST 20SQCM/<: CPT | Performed by: FAMILY MEDICINE

## 2024-10-30 PROCEDURE — G0277 HBOT, FULL BODY CHAMBER, 30M: HCPCS | Performed by: FAMILY MEDICINE

## 2024-10-30 PROCEDURE — 82948 REAGENT STRIP/BLOOD GLUCOSE: CPT | Performed by: FAMILY MEDICINE

## 2024-10-30 RX ORDER — LIDOCAINE 40 MG/G
CREAM TOPICAL ONCE
Status: COMPLETED | OUTPATIENT
Start: 2024-10-30 | End: 2024-10-30

## 2024-10-30 RX ADMIN — LIDOCAINE: 40 CREAM TOPICAL at 11:41

## 2024-10-30 NOTE — PROGRESS NOTES
Wound Procedure Treatment Diabetic Ulcer Left Heel    Performed by: Whitley Jaimes LPN  Authorized by: Nitza Sotelo DO    Associated wounds:   Wound 08/15/24 Diabetic Ulcer Heel Left  Wound cleansed with:  Dakin's 0.25%  Applied primary dressing:  Calcium alginate and Silver  Applied secondary dressing:  ABD  Dressing secured with:  Kerlix, Tape and Tubifast

## 2024-10-30 NOTE — PATIENT INSTRUCTIONS
Orders Placed This Encounter   Procedures    Wound cleansing and dressings     LEFT HEEL WOUND:       Wash your hands with soap and water. Remove old dressing, discard into plastic bag and place in trash. Cleanse the wound with dakins moistened gauze then saline prior to applying a clean dressing. Do not use tissue or cotton balls. Do not scrub the wound. Pat dry using gauze.      Shower no, unless able to keep wounds dry.      Apply lotion to skin surrounding wound   Apply Silver alginate to the open wound.   Cover with abd   Secure with rolled gauze and tape      Change dressing 3 times weekly after HBO     LEFT TOE AND LEFT LATERAL FOOT WOUND:     Wash your hands with soap and water.  Remove old dressing, discard into plastic bag and place in trash.  Cleanse the wound with dakins moistened gauze prior to applying a clean dressing. Do not use tissue or cotton balls. Do not scrub the wound. Pat dry using gauze.  Shower no, unless able to keep wounds dry     Apply dermagran  Cover with gauze  Secure with tape  Change dressing 3 times weekly after HBO            Off-loading Instructions: Wear off-loading device as directed by your physician (Globo Ped). Put on immediately when rising in the morning and remove when going to bed and Turn every 2 hours. Avoid position directing pressure to Wound site. Limit side lying to 30 degree tilt. Limit the head of bed elevation to 30 degrees. Limit walking and standing to only necessity for activities of daily living.      Wound infection: If you have signs of infection please call the wound center. If the wound center is closedplease go to the Emergency department. Some signs of infection: fever, chills, increased redness, red streaks, increase in pain, increased drainage. Drainage with an odor, Change in drainage color: white/milky/green/tan/yellow, an increase in swelling, chest pain and/or shortness of breath.     Protein: Eat protein with each meal to promote healing.  Examples of protein are fish, meat, chicken, nuts, peanut butter, eggs, lentils, edamame or a protein shake.      Continue with [facility] for wound care, Follow instructions for care, do not use bordered foam     Standing Status:   Future     Standing Expiration Date:   11/6/2024

## 2024-10-30 NOTE — LETTER
formerly [facility]  [address] [address]  Phone#  [phone]  Fax#  [phone]    Patient:  Thomas Horne  YOB: 1945  Phone:  [phone]  Date of Visit:  10/30/2024    Orders Placed This Encounter   Procedures   • Wound cleansing and dressings     LEFT HEEL WOUND:       Wash your hands with soap and water. Remove old dressing, discard into plastic bag and place in trash. Cleanse the wound with dakins moistened gauze then saline prior to applying a clean dressing. Do not use tissue or cotton balls. Do not scrub the wound. Pat dry using gauze.      Shower no, unless able to keep wounds dry.      Apply lotion to skin surrounding wound   Apply Silver alginate to the open wound.   Cover with abd   Secure with rolled gauze and tape      Change dressing 3 times weekly after HBO     LEFT TOE AND LEFT LATERAL FOOT WOUND:     Wash your hands with soap and water.  Remove old dressing, discard into plastic bag and place in trash.  Cleanse the wound with dakins moistened gauze prior to applying a clean dressing. Do not use tissue or cotton balls. Do not scrub the wound. Pat dry using gauze.  Shower no, unless able to keep wounds dry     Apply dermagran  Cover with gauze  Secure with tape  Change dressing 3 times weekly after HBO            Off-loading Instructions: Wear off-loading device as directed by your physician (Globo Ped). Put on immediately when rising in the morning and remove when going to bed and Turn every 2 hours. Avoid position directing pressure to Wound site. Limit side lying to 30 degree tilt. Limit the head of bed elevation to 30 degrees. Limit walking and standing to only necessity for activities of daily living.      Wound infection: If you have signs of infection please call the wound center. If the wound center is closedplease go to the Emergency department. Some signs of infection: fever, chills, increased redness, red streaks, increase in  pain, increased drainage. Drainage with an odor, Change in drainage color: white/milky/green/tan/yellow, an increase in swelling, chest pain and/or shortness of breath.     Protein: Eat protein with each meal to promote healing. Examples of protein are fish, meat, chicken, nuts, peanut butter, eggs, lentils, edamame or a protein shake.      Continue with [facility] for wound care, Follow instructions for care, do not use bordered foam     Standing Status:   Future     Standing Expiration Date:   11/6/2024         Electronically signed by Nitza Sotelo DO

## 2024-10-30 NOTE — PROGRESS NOTES
HBO Treatment Course Details       Treatment Notes: Treatment tolerated well.  No complaints of pain or discomfort.  Thomas will have his wound care appointment today.  Teed 3 right TM post treatment, patient scheduled tomorrow with ENT for tube placement.     Treatment Course Number: 3  Total Treatments Ordered:  40     Diagnosis:   1. Diabetic ulcer of left foot associated with type 2 diabetes mellitus, with muscle involvement without evidence of necrosis, unspecified part of foot (HCC)  [facility]          H[facility] Treatment Details:  In-Patient Visit: no  Treatment Length:90 Minutes(Minutes)  Chamber #: Hard sided Monoplace Chamber    Pre-Treatment details:  Pre-treatment protocol Treatment Protocol: 2.5 INDY X 90 minutes w/ 100% oxygen, two 5 minute air breaks  Left ear clear?: yes  Right ear clear?: yes  Left ear intact?: yes  Right ear intact?: yes                    Left ear TEED scale: Grade 0  Right ear TEED scale: Grade II   Pretreatment heart and lung assessment: Pretreatment heart and lung auscltation unremarkable. Patient cleared for HBOT     Treatment details:  INDY Rate: 2.5  Started Compression: 0837  Reached Compression: 0857  Total Compression Time: 20 (Minutes)  Total Holding Time: 100 (Minutes)  Started Decompression: 1037  Reached Surface: 1057  Total Decompression Time: 20 (Minutes)  Total Airbreaks:   (Minutes)  Total Time of Treatment: 140 (Minutes)  Symptoms Noted During Treatment: None (Minutes)    Post treatment details:  Left ear clear?: yes  Right ear clear?: yes  Left ears intact?: yes  Right ears intact?: yes                    Left ear TEED scale: Grade I  Right ear TEED scale: Grade III  Post treatment heart and lung assessment: Post treatment heart and lung auscltation unremarkable. Patient cleared for discharge. Tolerated treatment well.             Vital Signs:  HBO Glucose Reference Range: 100-350 mg/dl   Pre-Treatment Post-Treatment   Time vitals are taken: 0820 Time  vitals are taken: 1105   Blood Pressure: 129/70 Blood Pressure: 125/67   Pulse: 60 Pulse: 62   Resp: 18 Resp: 18   Temp: 96.9 °F (36.1 °C) Temp: 98 °F (36.7 °C)   Pre-Inspection Glucose readin Post-Inspection Glucose readin       Allergies   Allergen Reactions    Lisinopril Rash and Lip Swelling     Patient Active Problem List    Diagnosis Date Noted    Foot ulcer (HCC) 2024    SOB (shortness of breath) 2024    Nausea 2024    Elevated troponin 2024    History of DVT (deep vein thrombosis) 2024    Diabetic ulcer of toe of left foot associated with type 2 diabetes mellitus, limited to breakdown of skin ([facility]) 2024    Plantar fasciitis, right 07/10/2024    Acute deep vein thrombosis (DVT) of left peroneal vein ([facility]) 2024    Iron deficiency anemia 2024    Shortness of breath 2024    History of colon polyps 2024    Duodenal ulcer 2024    Multiple gastric ulcers 2023    Iron deficiency anemia due to chronic blood loss 2023    Melena 2023    Symptomatic anemia 2023    Frequent PVCs 2023    Acute on chronic diastolic heart failure ([facility]) 2023    Stage 3b chronic kidney disease ([facility]) 2023    Diabetic ulcer of right midfoot associated with diabetes mellitus due to underlying condition, limited to breakdown of skin ([facility]) 06/15/2023    Hypertensive urgency 2023    Elevated troponin level not due myocardial infarction 2023    Stable angina pectoris ([facility]) 2023    Chronic kidney disease-mineral and bone disorder 2022    Mild aortic stenosis 2022    Chronic HFpEF/Moderate pHTN (HFpEF) ([facility]) 11/10/2022    Gross hematuria 2022    Platelets decreased ([facility]) 2022    Acute kidney injury superimposed on chronic kidney disease  ([facility]) 2022    Actinic keratoses 2022    Type 2 diabetes mellitus with diabetic peripheral angiopathy without gangrene, with long-term current use  of insulin ([facility]) 01/11/2022    Varicose veins of left lower extremity 06/25/2021    History of endovascular stent graft for abdominal aortic aneurysm (AAA) 06/25/2021    Bruit (arterial) 06/25/2021    Peripheral artery disease (HCC) 06/25/2021    Type 2 diabetes mellitus with diabetic polyneuropathy, with long-term current use of insulin ([facility]) 06/10/2021    Mixed hyperlipidemia 05/11/2021    Primary hypertension 05/11/2021    Coronary artery disease involving native coronary artery of native heart without angina pectoris 05/11/2021    S/P angioplasty with stent 05/11/2021    Hx of CABG 05/11/2021    S/P AAA repair 05/11/2021    S/P prostatectomy 05/11/2021    H/O prostate cancer 05/11/2021     No orders of the defined types were placed in this encounter.

## 2024-10-30 NOTE — TELEPHONE ENCOUNTER
LM to inform patient that he will need to report to audiology at 10:00 AM tomorrow prior to his appointment in ENT at 10:30 AM for placement of ear tubes.  Audiolody is in our same building, on the 1sr floor, in [address].  If any questions, please forward the call directly to the office.  Thank you

## 2024-10-30 NOTE — PROGRESS NOTES
Wound Procedure Treatment    Performed by: Whitley Jaimes LPN  Authorized by: Nitza Sotelo DO    Associated wounds:   Wound 08/15/24 Diabetic Ulcer Plantar Left;Lateral  Wound 08/15/24 Diabetic Ulcer Toe D2, second Anterior;Left  Wound cleansed with:  Dakin's 0.25%  Applied primary dressing:  Dermagran  Applied secondary dressing:  Gauze  Dressing secured with:  Kerlix, Tape and Tubifast

## 2024-10-30 NOTE — PROGRESS NOTES
"Patient ID: Thomas Horne is a 79 y.o. male Date of Birth 1945     Chief Complaint  Chief Complaint   Patient presents with    Follow Up Wound Care Visit       Allergies  Lisinopril    Assessment:    No problem-specific Assessment & Plan notes found for this encounter.       Diagnoses and all orders for this visit:    Diabetic ulcer of left foot associated with type 2 diabetes mellitus, with muscle involvement without evidence of necrosis, unspecified part of foot (HCC)  -     Wound cleansing and dressings; Future  -     lidocaine (LMX) 4 % cream  -     Wound Procedure Treatment Diabetic Ulcer Left Heel  -     Wound Procedure Treatment    Peripheral artery disease (HCC)  -     Wound cleansing and dressings; Future  -     lidocaine (LMX) 4 % cream  -     Wound Procedure Treatment Diabetic Ulcer Left Heel  -     Wound Procedure Treatment    Type 2 diabetes mellitus with diabetic peripheral angiopathy without gangrene, with long-term current use of insulin (HCC)  -     Wound cleansing and dressings; Future  -     lidocaine (LMX) 4 % cream  -     Wound Procedure Treatment Diabetic Ulcer Left Heel  -     Wound Procedure Treatment    Diabetic ulcer of left foot associated with type 2 diabetes mellitus, with fat layer exposed, unspecified part of foot ([facility])  -     Wound cleansing and dressings; Future  -     lidocaine (LMX) 4 % cream  -     Wound Procedure Treatment Diabetic Ulcer Left Heel  -     Wound Procedure Treatment    Other orders  -     Debridement Diabetic Ulcer Left Heel  -     Debridement Diabetic Ulcer Left;Lateral Plantar  -     Debridement Diabetic Ulcer Anterior;Left Toe D2, second              Debridement   Wound 08/15/24 Diabetic Ulcer Heel Left    Universal Protocol:  procedure performed by consultantConsent: Verbal consent obtained.  Consent given by: patient  Time out: Immediately prior to procedure a \"time out\" was called to verify the correct patient, procedure, equipment, support staff and " "site/side marked as required.  Timeout called at: 10/30/2024 11:40 AM.  Patient understanding: patient states understanding of the procedure being performed  Patient identity confirmed: verbally with patient    Debridement Details  Performed by: physician  Debridement type: surgical  Level of debridement: subcutaneous tissue  Pain control: lidocaine 4%      Post-debridement measurements  Length (cm): 1.4  Width (cm): 3.3  Depth (cm): 0.4  Percent debrided: 100%  Surface Area (cm^2): 4.62  Area Debrided (cm^2): 4.62  Volume (cm^3): 1.85    Tissue and other material debrided: subcutaneous tissue  Devitalized tissue debrided: exudate and slough  Instrument(s) utilized: curette  Bleeding: small  Hemostasis obtained with: pressure  Response to treatment: procedure was tolerated well    Debridement   Wound 08/15/24 Diabetic Ulcer Plantar Left;Lateral    Universal Protocol:  procedure performed by consultantConsent: Verbal consent obtained.  Consent given by: patient  Time out: Immediately prior to procedure a \"time out\" was called to verify the correct patient, procedure, equipment, support staff and site/side marked as required.  Timeout called at: 10/30/2024 11:40 AM.  Patient understanding: patient states understanding of the procedure being performed  Patient identity confirmed: verbally with patient    Debridement Details  Performed by: physician  Debridement type: surgical  Level of debridement: subcutaneous tissue  Pain control: lidocaine 4%      Post-debridement measurements  Length (cm): 0.3  Width (cm): 0.3  Depth (cm): 0.3  Percent debrided: 100%  Surface Area (cm^2): 0.09  Area Debrided (cm^2): 0.09  Volume (cm^3): 0.03    Tissue and other material debrided: subcutaneous tissue  Devitalized tissue debrided: exudate and slough  Instrument(s) utilized: curette  Bleeding: small  Hemostasis obtained with: pressure  Response to treatment: procedure was tolerated well    Debridement   Wound 08/15/24 Diabetic Ulcer " "Toe D2, second Anterior;Left    Universal Protocol:  procedure performed by consultantConsent: Verbal consent obtained.  Consent given by: patient  Time out: Immediately prior to procedure a \"time out\" was called to verify the correct patient, procedure, equipment, support staff and site/side marked as required.  Timeout called at: 10/30/2024 11:40 AM.  Patient understanding: patient states understanding of the procedure being performed  Patient identity confirmed: verbally with patient    Debridement Details  Performed by: physician  Debridement type: surgical  Level of debridement: subcutaneous tissue  Pain control: lidocaine 4%      Post-debridement measurements  Length (cm): 0.3  Width (cm): 0.3  Depth (cm): 0.3  Percent debrided: 100%  Surface Area (cm^2): 0.09  Area Debrided (cm^2): 0.09  Volume (cm^3): 0.03    Tissue and other material debrided: subcutaneous tissue  Devitalized tissue debrided: exudate and slough  Instrument(s) utilized: curette  Bleeding: small  Hemostasis obtained with: pressure  Response to treatment: procedure was tolerated well        Plan:  Heel wound is slightly improved  Toe wound and lateral foot wound are callused over but noted to have significant trapped fluid beneath once debrided  Wounds debrided as above  Continue wound management with silver alginate on the heel but change to Dermagran on the toe and lateral foot wounds.  Cleanse with Dakin's at each dressing change.  See wound orders below  Continue with hyperbaric treatments  Follow-up in 1 week or call sooner with questions or concerns    Wound 08/15/24 Diabetic Ulcer Heel Left (Active)   Enter Sumner score: Sumner Grade 3: Deep abscess, OM, or joint sepsis 10/30/24 1128   Wound Image Images linked 10/30/24 1154   Wound Description Pink;Yellow;White 10/30/24 1128   Clara-wound Assessment Callus 10/30/24 1128   Wound Length (cm) 1.4 cm 10/30/24 1128   Wound Width (cm) 3.3 cm 10/30/24 1128   Wound Depth (cm) 0.3 cm 10/30/24 " 1128   Wound Surface Area (cm^2) 4.62 cm^2 10/30/24 1128   Wound Volume (cm^3) 1.386 cm^3 10/30/24 1128   Calculated Wound Volume (cm^3) 1.39 cm^3 10/30/24 1128   Change in Wound Size % -672.22 10/30/24 1128   Number of underminings 1 10/30/24 1128   Undermining 1 0.4 10/30/24 1128   Undermining 1 is depth extending from 11-1 10/30/24 1128   Drainage Amount Moderate 10/30/24 1128   Drainage Description Serosanguineous;Foul smelling 10/30/24 1128   Non-staged Wound Description Full thickness 10/30/24 1128       Wound 08/15/24 Diabetic Ulcer Plantar Left;Lateral (Active)   Enter Sumner score: Sumner Grade 2: Deep ulcer extended to ligament, tendon, joint capsule, bone, or deep fascia without abscess or osteomyelitis (OM) 10/30/24 1128   Wound Image Images linked 10/30/24 1154   Wound Description Dry;Brown 10/30/24 1128   Clara-wound Assessment Callus 10/30/24 1128   Wound Length (cm) 0.1 cm 10/30/24 1128   Wound Width (cm) 0.1 cm 10/30/24 1128   Wound Depth (cm) 0.1 cm 10/30/24 1128   Wound Surface Area (cm^2) 0.01 cm^2 10/30/24 1128   Wound Volume (cm^3) 0.001 cm^3 10/30/24 1128   Calculated Wound Volume (cm^3) 0 cm^3 10/30/24 1128   Change in Wound Size % 100 10/30/24 1128   Drainage Amount None 10/30/24 1128       Wound 08/15/24 Diabetic Ulcer Toe D2, second Anterior;Left (Active)   Enter Sumner score: Sumner Grade 3: Deep abscess, OM, or joint sepsis 10/30/24 1128   Wound Image Images linked 10/30/24 1153   Wound Description Dry;Brown 10/30/24 1128   Clara-wound Assessment Dry;Pink;Scaly 10/30/24 1128   Wound Length (cm) 0.1 cm 10/30/24 1128   Wound Width (cm) 0.1 cm 10/30/24 1128   Wound Depth (cm) 0.1 cm 10/30/24 1128   Wound Surface Area (cm^2) 0.01 cm^2 10/30/24 1128   Wound Volume (cm^3) 0.001 cm^3 10/30/24 1128   Calculated Wound Volume (cm^3) 0 cm^3 10/30/24 1128   Change in Wound Size % 100 10/30/24 1128   Drainage Amount Scant 10/30/24 1128   Drainage Description Serosanguineous 10/30/24 1128   Non-staged  Wound Description Full thickness 10/30/24 1128       Wound 08/15/24 Diabetic Ulcer Heel Left (Active)   Date First Assessed/Time First Assessed: 08/15/24 0943   Primary Wound Type: Diabetic Ulcer  Location: Heel  Wound Location Orientation: Left       Wound 08/15/24 Diabetic Ulcer Plantar Left;Lateral (Active)   Date First Assessed/Time First Assessed: 08/15/24 0943   Primary Wound Type: Diabetic Ulcer  Location: Plantar  Wound Location Orientation: Left;Lateral       Wound 08/15/24 Diabetic Ulcer Toe D2, second Anterior;Left (Active)   Date First Assessed/Time First Assessed: 08/15/24 0943   Primary Wound Type: Diabetic Ulcer  Location: Toe D2, second  Wound Location Orientation: Anterior;Left       [REMOVED] Wound 12/21/23 Diabetic Ulcer Ankle Posterior;Right (Removed)   Resolved Date: 08/15/24  Date First Assessed/Time First Assessed: 12/21/23 0954   Primary Wound Type: Diabetic Ulcer  Location: Ankle  Wound Location Orientation: Posterior;Right  Wound Description (Comments): MACDONALD 2  Wound Outcome: Unknown (No long...       [REMOVED] Wound 08/07/24 Groin Left (Removed)   Resolved Date: 08/22/24  Date First Assessed/Time First Assessed: 08/07/24 0927   Location: Groin  Wound Location Orientation: Left  Incision's 1st Dressing: ADHESIVE SKIN HIGH VISCOSITY EXOFIN PRECISION PEN (x0)  Wound Outcome: Unknown (No longer pre...       [REMOVED] Wound 08/30/24 Leg Left (Removed)   Resolved Date: 09/30/24  Date First Assessed/Time First Assessed: 08/30/24 1124   Location: Leg  Wound Location Orientation: Left  Wound Description (Comments): UPPER LEG AND LOWER LEG- MEPILEX  Incision's 1st Dressing: DRESSING MEPILEX AG BORDER POST...       [REMOVED] Wound 09/06/24 Pretibial Left;Proximal (Removed)   Resolved Date: 09/30/24  Date First Assessed/Time First Assessed: 09/06/24 1751   Location: Pretibial  Wound Location Orientation: Left;Proximal  Wound Outcome: (c) Other (Comment)       [REMOVED] Wound 09/06/24 Thigh  Anterior;Left (Removed)   Resolved Date: 09/16/24  Date First Assessed/Time First Assessed: 09/06/24 1752   Location: Thigh  Wound Location Orientation: Anterior;Left  Wound Outcome: (c) Other (Comment)       [REMOVED] Wound 09/06/24 Ankle Anterior;Left (Removed)   Resolved Date: 09/09/24  Date First Assessed/Time First Assessed: 09/06/24 1804   Location: Ankle  Wound Location Orientation: Anterior;Left  Wound Outcome: Unknown (No longer present)       [REMOVED] Wound 09/06/24 Toe D2, second Anterior;Left (Removed)   Resolved Date: 09/09/24  Date First Assessed/Time First Assessed: 09/06/24 1804   Location: Toe D2, second  Wound Location Orientation: Anterior;Left  Wound Outcome: Unknown (No longer present)       [REMOVED] Wound 09/06/24 Pedal Anterior;Left (Removed)   Resolved Date: 09/09/24  Date First Assessed/Time First Assessed: 09/06/24 1805   Location: Pedal  Wound Location Orientation: Anterior;Left  Wound Outcome: Unknown (No longer present)       Subjective:      .    Patient presents for follow-up of diabetic ulcers of the left foot.  No increased pain or drainage.  Patient currently undergoing hyperbaric oxygen therapy for his Sumner 3 DFU's.  Has been using silver alginate on the heel wound and Puracol Ag with hydrogel on the toe and lateral foot wounds        The following portions of the patient's history were reviewed and updated as appropriate: He  has a past medical history of Anemia, CAD (coronary artery disease), Callus, Cancer (HCC), CHF (congestive heart failure) (HCC), Chronic kidney disease, Clotting disorder (HCC), Coronary artery disease (1988), Deep vein thrombosis (HCC), Diabetes mellitus (HCC), Difficulty walking, Duodenal ulcer, Heart disease (1988), Hyperlipidemia, Hypertension, Myocardial infarction (HCC), Neuropathy, Neuropathy in diabetes ([facility]), Plantar fasciitis, and Sleep apnea.  He   Patient Active Problem List    Diagnosis Date Noted    Foot ulcer (HCC) 09/07/2024    SOB  (shortness of breath) 09/06/2024    Nausea 09/06/2024    Elevated troponin 09/06/2024    History of DVT (deep vein thrombosis) 08/26/2024    Diabetic ulcer of toe of left foot associated with type 2 diabetes mellitus, limited to breakdown of skin ([facility]) 08/08/2024    Plantar fasciitis, right 07/10/2024    Acute deep vein thrombosis (DVT) of left peroneal vein ([facility]) 05/01/2024    Iron deficiency anemia 04/23/2024    Shortness of breath 04/09/2024    History of colon polyps 02/22/2024    Duodenal ulcer 02/01/2024    Multiple gastric ulcers 12/27/2023    Iron deficiency anemia due to chronic blood loss 12/27/2023    Melena 12/26/2023    Symptomatic anemia 12/26/2023    Frequent PVCs 06/17/2023    Acute on chronic diastolic heart failure ([facility]) 06/16/2023    Stage 3b chronic kidney disease ([facility]) 06/16/2023    Diabetic ulcer of right midfoot associated with diabetes mellitus due to underlying condition, limited to breakdown of skin ([facility]) 06/15/2023    Hypertensive urgency 05/11/2023    Elevated troponin level not due myocardial infarction 05/11/2023    Stable angina pectoris ([facility]) 03/07/2023    Chronic kidney disease-mineral and bone disorder 11/30/2022    Mild aortic stenosis 11/11/2022    Chronic HFpEF/Moderate pHTN (HFpEF) ([facility]) 11/10/2022    Gross hematuria 07/26/2022    Platelets decreased ([facility]) 07/22/2022    Acute kidney injury superimposed on chronic kidney disease  ([facility]) 02/16/2022    Actinic keratoses 01/14/2022    Type 2 diabetes mellitus with diabetic peripheral angiopathy without gangrene, with long-term current use of insulin ([facility]) 01/11/2022    Varicose veins of left lower extremity 06/25/2021    History of endovascular stent graft for abdominal aortic aneurysm (AAA) 06/25/2021    Bruit (arterial) 06/25/2021    Peripheral artery disease (HCC) 06/25/2021    Type 2 diabetes mellitus with diabetic polyneuropathy, with long-term current use of insulin ([facility]) 06/10/2021    Mixed hyperlipidemia 05/11/2021    Primary  hypertension 05/11/2021    Coronary artery disease involving native coronary artery of native heart without angina pectoris 05/11/2021    S/P angioplasty with stent 05/11/2021    Hx of CABG 05/11/2021    S/P AAA repair 05/11/2021    S/P prostatectomy 05/11/2021    H/O prostate cancer 05/11/2021     He  reports that he quit smoking about 36 years ago. His smoking use included cigarettes. He started smoking about 68 years ago. He has a 49.5 pack-year smoking history. He has been exposed to tobacco smoke. He has never used smokeless tobacco. He reports that he does not currently use alcohol after a past usage of about 5.0 standard drinks of alcohol per week. He reports that he does not use drugs.  He is allergic to lisinopril..    Review of Systems   Constitutional:  Negative for chills and fever.   HENT:  Negative for congestion and sneezing.    Respiratory:  Negative for cough.    Cardiovascular:  Negative for leg swelling.   Musculoskeletal:  Negative for gait problem.   Skin:  Positive for wound.   Psychiatric/Behavioral:  Negative for agitation.          Objective:       Wound 08/15/24 Diabetic Ulcer Heel Left (Active)   Enter Sumner score: Sumner Grade 3: Deep abscess, OM, or joint sepsis 10/30/24 1128   Wound Image Images linked 10/30/24 1154   Wound Description Pink;Yellow;White 10/30/24 1128   Clara-wound Assessment Callus 10/30/24 1128   Wound Length (cm) 1.4 cm 10/30/24 1128   Wound Width (cm) 3.3 cm 10/30/24 1128   Wound Depth (cm) 0.3 cm 10/30/24 1128   Wound Surface Area (cm^2) 4.62 cm^2 10/30/24 1128   Wound Volume (cm^3) 1.386 cm^3 10/30/24 1128   Calculated Wound Volume (cm^3) 1.39 cm^3 10/30/24 1128   Change in Wound Size % -672.22 10/30/24 1128   Number of underminings 1 10/30/24 1128   Undermining 1 0.4 10/30/24 1128   Undermining 1 is depth extending from 11-1 10/30/24 1128   Drainage Amount Moderate 10/30/24 1128   Drainage Description Serosanguineous;Foul smelling 10/30/24 1128   Non-staged Wound  Description Full thickness 10/30/24 1128       Wound 08/15/24 Diabetic Ulcer Plantar Left;Lateral (Active)   Enter Sumner score: Sumner Grade 2: Deep ulcer extended to ligament, tendon, joint capsule, bone, or deep fascia without abscess or osteomyelitis (OM) 10/30/24 1128   Wound Image Images linked 10/30/24 1154   Wound Description Dry;Brown 10/30/24 1128   Clara-wound Assessment Callus 10/30/24 1128   Wound Length (cm) 0.1 cm 10/30/24 1128   Wound Width (cm) 0.1 cm 10/30/24 1128   Wound Depth (cm) 0.1 cm 10/30/24 1128   Wound Surface Area (cm^2) 0.01 cm^2 10/30/24 1128   Wound Volume (cm^3) 0.001 cm^3 10/30/24 1128   Calculated Wound Volume (cm^3) 0 cm^3 10/30/24 1128   Change in Wound Size % 100 10/30/24 1128   Drainage Amount None 10/30/24 1128       Wound 08/15/24 Diabetic Ulcer Toe D2, second Anterior;Left (Active)   Enter Sumner score: Sumner Grade 3: Deep abscess, OM, or joint sepsis 10/30/24 1128   Wound Image Images linked 10/30/24 1153   Wound Description Dry;Brown 10/30/24 1128   Clara-wound Assessment Dry;Pink;Scaly 10/30/24 1128   Wound Length (cm) 0.1 cm 10/30/24 1128   Wound Width (cm) 0.1 cm 10/30/24 1128   Wound Depth (cm) 0.1 cm 10/30/24 1128   Wound Surface Area (cm^2) 0.01 cm^2 10/30/24 1128   Wound Volume (cm^3) 0.001 cm^3 10/30/24 1128   Calculated Wound Volume (cm^3) 0 cm^3 10/30/24 1128   Change in Wound Size % 100 10/30/24 1128   Drainage Amount Scant 10/30/24 1128   Drainage Description Serosanguineous 10/30/24 1128   Non-staged Wound Description Full thickness 10/30/24 1128       /74   Pulse 60   Temp 97.8 °F (36.6 °C)   Resp 18     Physical Exam        Wound 08/15/24 Diabetic Ulcer Heel Left (Active)   Enter Sumner score: Sumner Grade 3: Deep abscess, OM, or joint sepsis 10/30/24 1128   Wound Image   10/30/24 1154   Wound Description Pink;Yellow;White 10/30/24 1128   Clara-wound Assessment Callus 10/30/24 1128   Wound Length (cm) 1.4 cm 10/30/24 1128   Wound Width (cm) 3.3 cm  10/30/24 1128   Wound Depth (cm) 0.3 cm 10/30/24 1128   Wound Surface Area (cm^2) 4.62 cm^2 10/30/24 1128   Wound Volume (cm^3) 1.386 cm^3 10/30/24 1128   Calculated Wound Volume (cm^3) 1.39 cm^3 10/30/24 1128   Change in Wound Size % -672.22 10/30/24 1128   Number of underminings 1 10/30/24 1128   Undermining 1 0.4 10/30/24 1128   Undermining 1 is depth extending from 11-1 10/30/24 1128   Drainage Amount Moderate 10/30/24 1128   Drainage Description Serosanguineous;Foul smelling 10/30/24 1128   Non-staged Wound Description Full thickness 10/30/24 1128   Dressing Status Intact 10/24/24 1021       Wound 08/15/24 Diabetic Ulcer Plantar Left;Lateral (Active)   Enter Sumner score: Sumner Grade 2: Deep ulcer extended to ligament, tendon, joint capsule, bone, or deep fascia without abscess or osteomyelitis (OM) 10/30/24 1128   Wound Image   10/30/24 1154   Wound Description Dry;Brown 10/30/24 1128   Clara-wound Assessment Callus 10/30/24 1128   Wound Length (cm) 0.1 cm 10/30/24 1128   Wound Width (cm) 0.1 cm 10/30/24 1128   Wound Depth (cm) 0.1 cm 10/30/24 1128   Wound Surface Area (cm^2) 0.01 cm^2 10/30/24 1128   Wound Volume (cm^3) 0.001 cm^3 10/30/24 1128   Calculated Wound Volume (cm^3) 0 cm^3 10/30/24 1128   Change in Wound Size % 100 10/30/24 1128   Drainage Amount None 10/30/24 1128   Drainage Description Serous 10/15/24 0825   Non-staged Wound Description Full thickness 10/24/24 1019   Dressing Status Intact 10/24/24 1019       Wound 08/15/24 Diabetic Ulcer Toe D2, second Anterior;Left (Active)   Enter Sumner score: Sumner Grade 3: Deep abscess, OM, or joint sepsis 10/30/24 1128   Wound Image   10/30/24 1153   Wound Description Dry;Brown 10/30/24 1128   Clara-wound Assessment Dry;Pink;Scaly 10/30/24 1128   Wound Length (cm) 0.1 cm 10/30/24 1128   Wound Width (cm) 0.1 cm 10/30/24 1128   Wound Depth (cm) 0.1 cm 10/30/24 1128   Wound Surface Area (cm^2) 0.01 cm^2 10/30/24 1128   Wound Volume (cm^3) 0.001 cm^3 10/30/24  1128   Calculated Wound Volume (cm^3) 0 cm^3 10/30/24 1128   Change in Wound Size % 100 10/30/24 1128   Number of underminings 1 09/30/24 0906   Undermining 1 0 10/15/24 0829   Undermining 1 is depth extending from 12-12 10/07/24 1112   Drainage Amount Scant 10/30/24 1128   Drainage Description Serosanguineous 10/30/24 1128   Non-staged Wound Description Full thickness 10/30/24 1128   Dressing Status Intact 10/24/24 1017                       Wound Instructions:  Orders Placed This Encounter   Procedures    Wound cleansing and dressings     LEFT HEEL WOUND:       Wash your hands with soap and water. Remove old dressing, discard into plastic bag and place in trash. Cleanse the wound with dakins moistened gauze then saline prior to applying a clean dressing. Do not use tissue or cotton balls. Do not scrub the wound. Pat dry using gauze.      Shower no, unless able to keep wounds dry.      Apply lotion to skin surrounding wound   Apply Silver alginate to the open wound.   Cover with abd   Secure with rolled gauze and tape      Change dressing 3 times weekly after HBO     LEFT TOE AND LEFT LATERAL FOOT WOUND:     Wash your hands with soap and water.  Remove old dressing, discard into plastic bag and place in trash.  Cleanse the wound with dakins moistened gauze prior to applying a clean dressing. Do not use tissue or cotton balls. Do not scrub the wound. Pat dry using gauze.  Shower no, unless able to keep wounds dry     Apply dermagran  Cover with gauze  Secure with tape  Change dressing 3 times weekly after HBO            Off-loading Instructions: Wear off-loading device as directed by your physician (Globo Ped). Put on immediately when rising in the morning and remove when going to bed and Turn every 2 hours. Avoid position directing pressure to Wound site. Limit side lying to 30 degree tilt. Limit the head of bed elevation to 30 degrees. Limit walking and standing to only necessity for activities of daily living.       Wound infection: If you have signs of infection please call the wound center. If the wound center is closedplease go to the Emergency department. Some signs of infection: fever, chills, increased redness, red streaks, increase in pain, increased drainage. Drainage with an odor, Change in drainage color: white/milky/green/tan/yellow, an increase in swelling, chest pain and/or shortness of breath.     Protein: Eat protein with each meal to promote healing. Examples of protein are fish, meat, chicken, nuts, peanut butter, eggs, lentils, edamame or a protein shake.      Continue with [facility] for wound care, Follow instructions for care, do not use bordered foam     Standing Status:   Future     Standing Expiration Date:   11/6/2024    Wound Procedure Treatment Diabetic Ulcer Left Heel     This order was created via procedure documentation    Wound Procedure Treatment     This order was created via procedure documentation    Debridement Diabetic Ulcer Left Heel     This order was created via procedure documentation    Debridement Diabetic Ulcer Left;Lateral Plantar     This order was created via procedure documentation    Debridement Diabetic Ulcer Anterior;Left Toe D2, second     This order was created via procedure documentation        Diagnosis ICD-10-CM Associated Orders   1. Diabetic ulcer of left foot associated with type 2 diabetes mellitus, with muscle involvement without evidence of necrosis, unspecified part of foot ([facility])  E11.621 Wound cleansing and dressings    L97.525 lidocaine (LMX) 4 % cream     Wound Procedure Treatment Diabetic Ulcer Left Heel     Wound Procedure Treatment      2. Peripheral artery disease ([facility])  I73.9 Wound cleansing and dressings     lidocaine (LMX) 4 % cream     Wound Procedure Treatment Diabetic Ulcer Left Heel     Wound Procedure Treatment      3. Type 2 diabetes mellitus with diabetic peripheral angiopathy without gangrene, with long-term current use of insulin ([facility])   E11.51 Wound cleansing and dressings    Z79.4 lidocaine (LMX) 4 % cream     Wound Procedure Treatment Diabetic Ulcer Left Heel     Wound Procedure Treatment      4. Diabetic ulcer of left foot associated with type 2 diabetes mellitus, with fat layer exposed, unspecified part of foot (HCC)  E11.621 Wound cleansing and dressings    L97.522 lidocaine (LMX) 4 % cream     Wound Procedure Treatment Diabetic Ulcer Left Heel     Wound Procedure Treatment

## 2024-10-31 ENCOUNTER — OFFICE VISIT (OUTPATIENT)
Dept: AUDIOLOGY | Facility: CLINIC | Age: 79
End: 2024-10-31
Payer: COMMERCIAL

## 2024-10-31 ENCOUNTER — OFFICE VISIT (OUTPATIENT)
Dept: OTOLARYNGOLOGY | Facility: CLINIC | Age: 79
End: 2024-10-31
Payer: COMMERCIAL

## 2024-10-31 VITALS — HEIGHT: 74 IN | BODY MASS INDEX: 28.88 KG/M2 | WEIGHT: 225 LBS

## 2024-10-31 DIAGNOSIS — H74.8X1 HEMOTYMPANUM, RIGHT: ICD-10-CM

## 2024-10-31 DIAGNOSIS — H90.A31 MIXED CONDUCTIVE AND SENSORINEURAL HEARING LOSS OF RIGHT EAR WITH RESTRICTED HEARING OF LEFT EAR: Primary | ICD-10-CM

## 2024-10-31 DIAGNOSIS — H90.A31 MIXED CONDUCTIVE AND SENSORINEURAL HEARING LOSS OF RIGHT EAR WITH RESTRICTED HEARING OF LEFT EAR: ICD-10-CM

## 2024-10-31 DIAGNOSIS — Z92.89 HISTORY OF HYPERBARIC OXYGEN THERAPY: Primary | ICD-10-CM

## 2024-10-31 DIAGNOSIS — H69.91 DYSFUNCTION OF RIGHT EUSTACHIAN TUBE: ICD-10-CM

## 2024-10-31 PROCEDURE — 92567 TYMPANOMETRY: CPT | Performed by: AUDIOLOGIST

## 2024-10-31 PROCEDURE — 69433 CREATE EARDRUM OPENING: CPT | Performed by: OTOLARYNGOLOGY

## 2024-10-31 PROCEDURE — 92557 COMPREHENSIVE HEARING TEST: CPT | Performed by: AUDIOLOGIST

## 2024-10-31 PROCEDURE — 99204 OFFICE O/P NEW MOD 45 MIN: CPT | Performed by: OTOLARYNGOLOGY

## 2024-10-31 NOTE — PROGRESS NOTES
ENT HEARING EVALUATION    Name:  Thomas Horne  :  1945  Age:  79 y.o.   MRN:  [id number]  Date of Evaluation: 10/31/24     HISTORY:    Reason for visit:  R ear fullness secondary to hyperbaric chamber treatments / left foot     Thomas Horne is being seen today for an evaluation of hearing as part of their ENT visit.     EVALUATION:    Otoscopy revealed clear canals / TM appears red, opaque     Tympanometry:   Right Ear: Type B; no measurable middle ear pressure or static compliance, consistent with middle ear pathology.    Left Ear: Type A; normal middle ear pressure and static compliance     Speech Audiometry:  Speech Reception (SRT)   Right Ear: 65 dB HL   Left Ear: 30 dB HL    Word Recognition Scores (WRS):  Right Ear: good (88 % correct)     Left Ear: excellent (100 % correct)   Stimuli: [id number]    Pure Tone Audiometry:  Conventional pure tone audiometry from 250 - 8000 Hz  was obtained with good reliability and revealed the following:     Right Ear:  Moderate to severe 250-1000 Hz, rising to moderate 5526-4636 Hz, sloping to severe mixed loss    Left Ear: Normal sloping to severe sensorineural hearing loss (SNHL)     *see attached audiogram for configuration     RECOMMENDATIONS:  Follow up per Dr. Gaspar (ENT)  Audiological re-evaluation following all otologic intervention     Clarisse Salcido, CCC-A  Clinical Audiologist  [id number]  [phone]

## 2024-10-31 NOTE — PROGRESS NOTES
"Assessment/Plan:  Right hemotympanum associated with HBO.  Bilateral myringotomy with tube insertion performed in office.  We discussed the option of just doing the right ear, but agreed to be more proactive and do both.  Water precautions.  F/u in 1 wk.  Pt is cleared to resume HBO.      Diagnosis ICD-10-CM Associated Orders   1. History of hyperbaric oxygen therapy  Z92.89       2. Hemotympanum, right  H74.8X1       3. Dysfunction of right eustachian tube  H69.91              Subjective:      Patient ID: Thomas Horne is a 79 y.o. male.    Pt is having HBO for a foot ulcer, and developed a clogged right ear.        The following portions of the patient's history were reviewed and updated as appropriate: allergies, current medications, past family history, past medical history, past social history, past surgical history and problem list.    Review of Systems      Objective:      Ht 6' 2\" (1.88 m)   Wt 102 kg (225 lb)   BMI 28.89 kg/m²          Physical Exam  Constitutional:       Appearance: He is well-developed.   HENT:      Head: Normocephalic and atraumatic.      Right Ear: Tympanic membrane, ear canal and external ear normal. No drainage. No middle ear effusion.      Left Ear: Tympanic membrane, ear canal and external ear normal. No drainage.  No middle ear effusion.      Nose: Nose normal.      Mouth/Throat:      Pharynx: Uvula midline. No oropharyngeal exudate.      Tonsils: 2+ on the right. 2+ on the left.   Neck:      Thyroid: No thyroid mass or thyromegaly.      Trachea: Trachea normal. No tracheal deviation.   Lymphadenopathy:      Cervical: No cervical adenopathy.   Neurological:      Mental Status: He is alert.         Audiogram:  AD: MHL  AS: SNHL  Tympanogram:  AD: type B  AS: type A    Procedure: Myringotomy and tube insertion (75231)  Indication: Right hemotympanum  Surgeon: Harris Gaspar MD  The risks and benefits of the procedure were discussed with the patient, and the patient's consent was " obtained.    The patient was placed in the left lateral decubitus position.  Using the operating otoscope and speculum, the right tympanic membrane was visualized and a drop of phenol was applied to the anteroinferior quadrant.  An anteroinferior myringotomy was performed.  Effusion was suctioned from the middle ear.   A silicone myringotomy tube  was inserted.  The patient tolerated the procedure well.  The exact procedure was then performed on the opposite side.  There were no complications.

## 2024-11-01 ENCOUNTER — OFFICE VISIT (OUTPATIENT)
Dept: WOUND CARE | Facility: HOSPITAL | Age: 79
End: 2024-11-01
Payer: COMMERCIAL

## 2024-11-01 ENCOUNTER — PATIENT OUTREACH (OUTPATIENT)
Dept: CASE MANAGEMENT | Facility: HOSPITAL | Age: 79
End: 2024-11-01

## 2024-11-01 VITALS
RESPIRATION RATE: 18 BRPM | TEMPERATURE: 98 F | HEART RATE: 65 BPM | SYSTOLIC BLOOD PRESSURE: 129 MMHG | DIASTOLIC BLOOD PRESSURE: 70 MMHG

## 2024-11-01 DIAGNOSIS — E11.621 DIABETIC ULCER OF LEFT FOOT ASSOCIATED WITH TYPE 2 DIABETES MELLITUS, WITH MUSCLE INVOLVEMENT WITHOUT EVIDENCE OF NECROSIS, UNSPECIFIED PART OF FOOT (HCC): ICD-10-CM

## 2024-11-01 DIAGNOSIS — L97.525 DIABETIC ULCER OF LEFT FOOT ASSOCIATED WITH TYPE 2 DIABETES MELLITUS, WITH MUSCLE INVOLVEMENT WITHOUT EVIDENCE OF NECROSIS, UNSPECIFIED PART OF FOOT (HCC): ICD-10-CM

## 2024-11-01 LAB
GLUCOSE SERPL-MCNC: 175 MG/DL (ref 65–140)
GLUCOSE SERPL-MCNC: 185 MG/DL (ref 65–140)

## 2024-11-01 PROCEDURE — G0277 HBOT, FULL BODY CHAMBER, 30M: HCPCS | Performed by: FAMILY MEDICINE

## 2024-11-01 PROCEDURE — 82948 REAGENT STRIP/BLOOD GLUCOSE: CPT | Performed by: FAMILY MEDICINE

## 2024-11-01 PROCEDURE — 99183 HYPERBARIC OXYGEN THERAPY: CPT | Performed by: FAMILY MEDICINE

## 2024-11-01 NOTE — PROGRESS NOTES
Chart reviewed. Patient had PCP apt on 10/17 since last outreach. Patient had ear cerumen removal from left ear. No medication changes. Patient follows at [facility] for HBO of diabetic left foot ulcer.    10/31 Audiology: Reason for visit:  R ear fullness secondary to hyperbaric chamber treatments / left foot   10/31 ENT: Right hemotympanum associated with HBO.  Bilateral myringotomy with tube insertion performed in office.  We discussed the option of just doing the right ear, but agreed to be more proactive and do both. Water precautions.  F/u in 1 wk. Pt is cleared to resume HBO.     This RNCM called patient for follow up. Patient says he is doing well and his breathing is good. He denies any SOB, CP or chest tightness. He denies any dizziness, lightheadedness  No swelling. He says his hearing is good and no pain from tubes being placed. Patient says he continues to weigh self daily and today he weighed 225.2 lbs. We reviewed wt gain parameters and he is aware to call his Cardiologist with a wt gin of 1 lb in 1 day or 5 in 5. Patient states his diabetes is under control and he has no concers regarding.    Patient says he has all meds and he does not need refills. Patient denies any questions or concerns regarding.    Patient declined CCM services or follow up. Patient declined my contact information stating he will call his doctor if he needs anything.    CCM episode closed and this RNCM removed self from care team.           RNCM to follow.

## 2024-11-01 NOTE — PROGRESS NOTES
HBO Treatment Course Details       Treatment Notes: Treatment tolerated well.  No complaints of pain or discomfort. Had tympanostomy tubes placed yesterday.      Treatment Course Number: 4  Total Treatments Ordered:  40     Diagnosis:   1. Diabetic ulcer of left foot associated with type 2 diabetes mellitus, with muscle involvement without evidence of necrosis, unspecified part of foot (HCC)  [facility]          H[facility] Treatment Details:  In-Patient Visit: no  Treatment Length:90 Minutes(Minutes)  Chamber #: Hard sided Monoplace Chamber    Pre-Treatment details:  Pre-treatment protocol Treatment Protocol: 2.5 INDY X 90 minutes w/ 100% oxygen, two 5 minute air breaks  Left ear clear?: yes  Right ear clear?: yes              PE Tubes present, Left ear?: yes  PE Tubes present, Right ear?: yes        Left ear TEED scale: Grade 0  Right ear TEED scale: Grade 0   Pretreatment heart and lung assessment: Pretreatment heart and lung auscltation unremarkable. Patient cleared for HBOT     Treatment details:  INDY Rate: 2.5  Started Compression: 0849  Reached Compression: 0902  Total Compression Time: 13 (Minutes)  Total Holding Time: 100 (Minutes)  Started Decompression: 1042  Reached Surface: 1055  Total Decompression Time: 13 (Minutes)  Total Airbreaks:   (Minutes)  Total Time of Treatment: 126 (Minutes)  Symptoms Noted During Treatment: None (Minutes)    Post treatment details:  Left ear clear?: yes  Right ear clear?: yes              PE Tubes present, Left ear?: yes  PE Tubes present, Right ear?: yes        Left ear TEED scale: Grade 0  Right ear TEED scale: Grade 0  Post treatment heart and lung assessment: Post treatment heart and lung auscltation unremarkable. Patient cleared for discharge. Tolerated treatment well.             Vital Signs:  HBO Glucose Reference Range: 100-350 mg/dl   Pre-Treatment Post-Treatment   Time vitals are taken: 0815 Time vitals are taken: 1105   Blood Pressure: 129/70 Blood  Pressure: 134/66   Pulse: 65 Pulse: 62   Resp: 18 Resp: 18   Temp: 98 °F (36.7 °C) Temp: 97.6 °F (36.4 °C)   Pre-Inspection Glucose readin Post-Inspection Glucose readin       Allergies   Allergen Reactions    Lisinopril Rash and Lip Swelling     Patient Active Problem List    Diagnosis Date Noted    Foot ulcer (HCC) 2024    SOB (shortness of breath) 2024    Nausea 2024    Elevated troponin 2024    History of DVT (deep vein thrombosis) 2024    Diabetic ulcer of toe of left foot associated with type 2 diabetes mellitus, limited to breakdown of skin (HCC) 2024    Plantar fasciitis, right 07/10/2024    Acute deep vein thrombosis (DVT) of left peroneal vein ([facility]) 2024    Iron deficiency anemia 2024    Shortness of breath 2024    History of colon polyps 2024    Duodenal ulcer 2024    Multiple gastric ulcers 2023    Iron deficiency anemia due to chronic blood loss 2023    Melena 2023    Symptomatic anemia 2023    Frequent PVCs 2023    Acute on chronic diastolic heart failure (HCC) 2023    Stage 3b chronic kidney disease ([facility]) 2023    Diabetic ulcer of right midfoot associated with diabetes mellitus due to underlying condition, limited to breakdown of skin ([facility]) 06/15/2023    Hypertensive urgency 2023    Elevated troponin level not due myocardial infarction 2023    Stable angina pectoris ([facility]) 2023    Chronic kidney disease-mineral and bone disorder 2022    Mild aortic stenosis 2022    Chronic HFpEF/Moderate pHTN (HFpEF) ([facility]) 11/10/2022    Gross hematuria 2022    Platelets decreased ([facility]) 2022    Acute kidney injury superimposed on chronic kidney disease  ([facility]) 2022    Actinic keratoses 2022    Type 2 diabetes mellitus with diabetic peripheral angiopathy without gangrene, with long-term current use of insulin ([facility]) 2022    Varicose veins of left  lower extremity 06/25/2021    History of endovascular stent graft for abdominal aortic aneurysm (AAA) 06/25/2021    Bruit (arterial) 06/25/2021    Peripheral artery disease (HCC) 06/25/2021    Type 2 diabetes mellitus with diabetic polyneuropathy, with long-term current use of insulin (HCC) 06/10/2021    Mixed hyperlipidemia 05/11/2021    Primary hypertension 05/11/2021    Coronary artery disease involving native coronary artery of native heart without angina pectoris 05/11/2021    S/P angioplasty with stent 05/11/2021    Hx of CABG 05/11/2021    S/P AAA repair 05/11/2021    S/P prostatectomy 05/11/2021    H/O prostate cancer 05/11/2021     No orders of the defined types were placed in this encounter.

## 2024-11-01 NOTE — TELEPHONE ENCOUNTER
Per pt he was on 100mg of lyrica TID then increased to 150mg TID he will decrease to 150mg BID (back to 300mg a day) to see how he feels and will let [facility] know.

## 2024-11-04 ENCOUNTER — OFFICE VISIT (OUTPATIENT)
Dept: WOUND CARE | Facility: HOSPITAL | Age: 79
End: 2024-11-04
Payer: COMMERCIAL

## 2024-11-04 VITALS
HEART RATE: 68 BPM | TEMPERATURE: 96.6 F | DIASTOLIC BLOOD PRESSURE: 69 MMHG | RESPIRATION RATE: 18 BRPM | SYSTOLIC BLOOD PRESSURE: 155 MMHG

## 2024-11-04 DIAGNOSIS — E11.621 DIABETIC ULCER OF LEFT FOOT ASSOCIATED WITH TYPE 2 DIABETES MELLITUS, WITH FAT LAYER EXPOSED, UNSPECIFIED PART OF FOOT (HCC): ICD-10-CM

## 2024-11-04 DIAGNOSIS — Z79.4 TYPE 2 DIABETES MELLITUS WITH DIABETIC PERIPHERAL ANGIOPATHY WITHOUT GANGRENE, WITH LONG-TERM CURRENT USE OF INSULIN (HCC): ICD-10-CM

## 2024-11-04 DIAGNOSIS — L97.525 DIABETIC ULCER OF LEFT FOOT ASSOCIATED WITH TYPE 2 DIABETES MELLITUS, WITH MUSCLE INVOLVEMENT WITHOUT EVIDENCE OF NECROSIS, UNSPECIFIED PART OF FOOT (HCC): ICD-10-CM

## 2024-11-04 DIAGNOSIS — E11.621 DIABETIC ULCER OF LEFT FOOT ASSOCIATED WITH TYPE 2 DIABETES MELLITUS, WITH MUSCLE INVOLVEMENT WITHOUT EVIDENCE OF NECROSIS, UNSPECIFIED PART OF FOOT (HCC): ICD-10-CM

## 2024-11-04 DIAGNOSIS — L97.522 DIABETIC ULCER OF LEFT FOOT ASSOCIATED WITH TYPE 2 DIABETES MELLITUS, WITH FAT LAYER EXPOSED, UNSPECIFIED PART OF FOOT (HCC): ICD-10-CM

## 2024-11-04 DIAGNOSIS — E11.51 TYPE 2 DIABETES MELLITUS WITH DIABETIC PERIPHERAL ANGIOPATHY WITHOUT GANGRENE, WITH LONG-TERM CURRENT USE OF INSULIN (HCC): ICD-10-CM

## 2024-11-04 DIAGNOSIS — I73.9 PERIPHERAL ARTERY DISEASE (HCC): ICD-10-CM

## 2024-11-04 LAB
GLUCOSE SERPL-MCNC: 192 MG/DL (ref 65–140)
GLUCOSE SERPL-MCNC: 203 MG/DL (ref 65–140)

## 2024-11-04 PROCEDURE — G0277 HBOT, FULL BODY CHAMBER, 30M: HCPCS | Performed by: FAMILY MEDICINE

## 2024-11-04 PROCEDURE — 82948 REAGENT STRIP/BLOOD GLUCOSE: CPT | Performed by: FAMILY MEDICINE

## 2024-11-04 PROCEDURE — 99183 HYPERBARIC OXYGEN THERAPY: CPT | Performed by: FAMILY MEDICINE

## 2024-11-04 NOTE — PROGRESS NOTES
HBO Treatment Course Details       Treatment Notes: Treatment tolerated well,  No complaints of pain or discomfort.  Patient stated that his ear popped after being checked out by the doctor and now he can hear clearer.     Treatment Course Number: 5  Total Treatments Ordered:  40     Diagnosis:   1. Diabetic ulcer of left foot associated with type 2 diabetes mellitus, with muscle involvement without evidence of necrosis, unspecified part of foot (HCC)  Wound cleansing and dressings      2. Peripheral artery disease (HCC)  Wound cleansing and dressings      3. Type 2 diabetes mellitus with diabetic peripheral angiopathy without gangrene, with long-term current use of insulin (HCC)  Wound cleansing and dressings      4. Diabetic ulcer of left foot associated with type 2 diabetes mellitus, with fat layer exposed, unspecified part of foot (HCC)  Wound cleansing and dressings          HBO Treatment Details:  In-Patient Visit: no  Treatment Length:90 Minutes(Minutes)  Chamber #: Hard sided Monoplace Chamber    Pre-Treatment details:  Pre-treatment protocol Treatment Protocol: 2.5 INDY X 90 minutes w/ 100% oxygen, two 5 minute air breaks  Left ear clear?: yes  Right ear clear?: yes              PE Tubes present, Left ear?: yes  PE Tubes present, Right ear?: yes        Left ear TEED scale: Grade 0  Right ear TEED scale: Grade 0   Pretreatment heart and lung assessment: Pretreatment heart and lung auscltation unremarkable. Patient cleared for HBOT     Treatment details:  INDY Rate: 2.5  Started Compression: 0842  Reached Compression: 0852  Total Compression Time: 10 (Minutes)  Total Holding Time: 100 (Minutes)  Started Decompression: 1032  Reached Surface: 1042  Total Decompression Time: 10 (Minutes)  Total Airbreaks:   (Minutes)  Total Time of Treatment: 120 (Minutes)  Symptoms Noted During Treatment: None (Minutes)    Post treatment details:  Left ear clear?: yes  Right ear clear?: yes              PE Tubes present, Left  ear?: yes  PE Tubes present, Right ear?: yes        Left ear TEED scale: Grade 0  Right ear TEED scale: Grade 0  Post treatment heart and lung assessment: Post treatment heart and lung auscltation unremarkable. Patient cleared for discharge. Tolerated treatment well.             Vital Signs:  [facility] Glucose Reference Range: 100-350 mg/dl   Pre-Treatment Post-Treatment   Time vitals are taken: 0820 Time vitals are taken: 1105   Blood Pressure: 155/69 Blood Pressure: 137/67   Pulse: 68 Pulse: 61   Resp: 18 Resp: 18   Temp: 96.6 °F (35.9 °C) Temp: 97.3 °F (36.3 °C)   Pre-Inspection Glucose readin Post-Inspection Glucose readin       Allergies   Allergen Reactions    Lisinopril Rash and Lip Swelling     Patient Active Problem List    Diagnosis Date Noted    Foot ulcer (HCC) 2024    SOB (shortness of breath) 2024    Nausea 2024    Elevated troponin 2024    History of DVT (deep vein thrombosis) 2024    Diabetic ulcer of toe of left foot associated with type 2 diabetes mellitus, limited to breakdown of skin (HCC) 2024    Plantar fasciitis, right 07/10/2024    Acute deep vein thrombosis (DVT) of left peroneal vein (HCC) 2024    Iron deficiency anemia 2024    Shortness of breath 2024    History of colon polyps 2024    Duodenal ulcer 2024    Multiple gastric ulcers 2023    Iron deficiency anemia due to chronic blood loss 2023    Melena 2023    Symptomatic anemia 2023    Frequent PVCs 2023    Acute on chronic diastolic heart failure (HCC) 2023    Stage 3b chronic kidney disease (HCC) 2023    Diabetic ulcer of right midfoot associated with diabetes mellitus due to underlying condition, limited to breakdown of skin (HCC) 06/15/2023    Hypertensive urgency 2023    Elevated troponin level not due myocardial infarction 2023    Stable angina pectoris (HCC) 2023    Chronic kidney disease-mineral and  bone disorder 11/30/2022    Mild aortic stenosis 11/11/2022    Chronic HFpEF/Moderate pHTN (HFpEF) ([facility]) 11/10/2022    Gross hematuria 07/26/2022    Platelets decreased ([facility]) 07/22/2022    Acute kidney injury superimposed on chronic kidney disease  ([facility]) 02/16/2022    Actinic keratoses 01/14/2022    Type 2 diabetes mellitus with diabetic peripheral angiopathy without gangrene, with long-term current use of insulin ([facility]) 01/11/2022    Varicose veins of left lower extremity 06/25/2021    History of endovascular stent graft for abdominal aortic aneurysm (AAA) 06/25/2021    Bruit (arterial) 06/25/2021    Peripheral artery disease (HCC) 06/25/2021    Type 2 diabetes mellitus with diabetic polyneuropathy, with long-term current use of insulin ([facility]) 06/10/2021    Mixed hyperlipidemia 05/11/2021    Primary hypertension 05/11/2021    Coronary artery disease involving native coronary artery of native heart without angina pectoris 05/11/2021    S/P angioplasty with stent 05/11/2021    Hx of CABG 05/11/2021    S/P AAA repair 05/11/2021    S/P prostatectomy 05/11/2021    H/O prostate cancer 05/11/2021     No orders of the defined types were placed in this encounter.

## 2024-11-05 ENCOUNTER — OFFICE VISIT (OUTPATIENT)
Dept: WOUND CARE | Facility: HOSPITAL | Age: 79
End: 2024-11-05
Payer: COMMERCIAL

## 2024-11-05 VITALS
RESPIRATION RATE: 18 BRPM | HEART RATE: 60 BPM | DIASTOLIC BLOOD PRESSURE: 70 MMHG | TEMPERATURE: 97.3 F | SYSTOLIC BLOOD PRESSURE: 152 MMHG

## 2024-11-05 DIAGNOSIS — E11.621 DIABETIC ULCER OF LEFT FOOT ASSOCIATED WITH TYPE 2 DIABETES MELLITUS, WITH MUSCLE INVOLVEMENT WITHOUT EVIDENCE OF NECROSIS, UNSPECIFIED PART OF FOOT (HCC): ICD-10-CM

## 2024-11-05 DIAGNOSIS — L97.525 DIABETIC ULCER OF LEFT FOOT ASSOCIATED WITH TYPE 2 DIABETES MELLITUS, WITH MUSCLE INVOLVEMENT WITHOUT EVIDENCE OF NECROSIS, UNSPECIFIED PART OF FOOT (HCC): ICD-10-CM

## 2024-11-05 LAB
GLUCOSE SERPL-MCNC: 215 MG/DL (ref 65–140)
GLUCOSE SERPL-MCNC: 237 MG/DL (ref 65–140)

## 2024-11-05 PROCEDURE — 99183 HYPERBARIC OXYGEN THERAPY: CPT | Performed by: FAMILY MEDICINE

## 2024-11-05 PROCEDURE — G0277 HBOT, FULL BODY CHAMBER, 30M: HCPCS | Performed by: FAMILY MEDICINE

## 2024-11-05 PROCEDURE — 82948 REAGENT STRIP/BLOOD GLUCOSE: CPT | Performed by: FAMILY MEDICINE

## 2024-11-05 NOTE — PROGRESS NOTES
HBO Treatment Course Details       Treatment Notes: Treatment tolerated.  Patient requested to end dive early due upset stomach.  Patient stated he felt better after he went to the bathroom     Treatment Course Number:   Total Treatments Ordered:  40     Diagnosis:   1. Diabetic ulcer of left foot associated with type 2 diabetes mellitus, with muscle involvement without evidence of necrosis, unspecified part of foot (HCC)  [facility]          H[facility] Treatment Details:  In-Patient Visit: no  Treatment Length:90 Minutes(Minutes)  Chamber #: Hard sided Monoplace Chamber    Pre-Treatment details:  Pre-treatment protocol Treatment Protocol: 2.5 INDY X 90 minutes w/ 100% oxygen, two 5 minute air breaks  Left ear clear?: yes  Right ear clear?: yes              PE Tubes present, Left ear?: yes  PE Tubes present, Right ear?: yes        Left ear TEED scale: Grade 0  Right ear TEED scale: Grade 0   Pretreatment heart and lung assessment: Pretreatment heart and lung auscltation unremarkable. Patient cleared for HBOT     Treatment details:  INDY Rate: 2.5  Started Compression: 0842  Reached Compression: 0852  Total Compression Time: 10 (Minutes)  Total Holding Time: 60 (Minutes)  Started Decompression: 0952  Reached Surface: 1002  Total Decompression Time: 10 (Minutes)  Total Airbreaks:   (Minutes)  Total Time of Treatment: 80 (Minutes)  Symptoms Noted During Treatment: Other (Comment) (Minutes)    Post treatment details:  Left ear clear?: yes  Right ear clear?: yes              PE Tubes present, Left ear?: yes  PE Tubes present, Right ear?: yes        Left ear TEED scale: Grade 0  Right ear TEED scale: Grade 0  Post treatment heart and lung assessment: Post treatment heart and lung auscltation unremarkable. Patient cleared for discharge. Tolerated treatment well.             Vital Signs:  HBO Glucose Reference Range: 100-350 mg/dl   Pre-Treatment Post-Treatment   Time vitals are taken: 0815 Time vitals are taken: 1015    Blood Pressure: 152/70 Blood Pressure: 160/71   Pulse: 60 Pulse: 62   Resp: 18 Resp: 18   Temp: 97.3 °F (36.3 °C) Temp: 97.5 °F (36.4 °C)   Pre-Inspection Glucose readin Post-Inspection Glucose readin       Allergies   Allergen Reactions    Lisinopril Rash and Lip Swelling     Patient Active Problem List    Diagnosis Date Noted    Foot ulcer (HCC) 2024    SOB (shortness of breath) 2024    Nausea 2024    Elevated troponin 2024    History of DVT (deep vein thrombosis) 2024    Diabetic ulcer of toe of left foot associated with type 2 diabetes mellitus, limited to breakdown of skin ([facility]) 2024    Plantar fasciitis, right 07/10/2024    Acute deep vein thrombosis (DVT) of left peroneal vein ([facility]) 2024    Iron deficiency anemia 2024    Shortness of breath 2024    History of colon polyps 2024    Duodenal ulcer 2024    Multiple gastric ulcers 2023    Iron deficiency anemia due to chronic blood loss 2023    Melena 2023    Symptomatic anemia 2023    Frequent PVCs 2023    Acute on chronic diastolic heart failure ([facility]) 2023    Stage 3b chronic kidney disease ([facility]) 2023    Diabetic ulcer of right midfoot associated with diabetes mellitus due to underlying condition, limited to breakdown of skin ([facility]) 06/15/2023    Hypertensive urgency 2023    Elevated troponin level not due myocardial infarction 2023    Stable angina pectoris ([facility]) 2023    Chronic kidney disease-mineral and bone disorder 2022    Mild aortic stenosis 2022    Chronic HFpEF/Moderate pHTN (HFpEF) ([facility]) 11/10/2022    Gross hematuria 2022    Platelets decreased ([facility]) 2022    Acute kidney injury superimposed on chronic kidney disease  ([facility]) 2022    Actinic keratoses 2022    Type 2 diabetes mellitus with diabetic peripheral angiopathy without gangrene, with long-term current use of insulin ([facility])  01/11/2022    Varicose veins of left lower extremity 06/25/2021    History of endovascular stent graft for abdominal aortic aneurysm (AAA) 06/25/2021    Bruit (arterial) 06/25/2021    Peripheral artery disease (HCC) 06/25/2021    Type 2 diabetes mellitus with diabetic polyneuropathy, with long-term current use of insulin (HCC) 06/10/2021    Mixed hyperlipidemia 05/11/2021    Primary hypertension 05/11/2021    Coronary artery disease involving native coronary artery of native heart without angina pectoris 05/11/2021    S/P angioplasty with stent 05/11/2021    Hx of CABG 05/11/2021    S/P AAA repair 05/11/2021    S/P prostatectomy 05/11/2021    H/O prostate cancer 05/11/2021     No orders of the defined types were placed in this encounter.

## 2024-11-06 ENCOUNTER — OFFICE VISIT (OUTPATIENT)
Dept: WOUND CARE | Facility: HOSPITAL | Age: 79
End: 2024-11-06
Payer: COMMERCIAL

## 2024-11-06 VITALS
DIASTOLIC BLOOD PRESSURE: 76 MMHG | RESPIRATION RATE: 18 BRPM | HEART RATE: 60 BPM | TEMPERATURE: 97.7 F | SYSTOLIC BLOOD PRESSURE: 146 MMHG

## 2024-11-06 DIAGNOSIS — E11.621 DIABETIC ULCER OF LEFT FOOT ASSOCIATED WITH TYPE 2 DIABETES MELLITUS, WITH MUSCLE INVOLVEMENT WITHOUT EVIDENCE OF NECROSIS, UNSPECIFIED PART OF FOOT (HCC): ICD-10-CM

## 2024-11-06 DIAGNOSIS — L97.525 DIABETIC ULCER OF LEFT FOOT ASSOCIATED WITH TYPE 2 DIABETES MELLITUS, WITH MUSCLE INVOLVEMENT WITHOUT EVIDENCE OF NECROSIS, UNSPECIFIED PART OF FOOT (HCC): ICD-10-CM

## 2024-11-06 LAB
GLUCOSE SERPL-MCNC: 188 MG/DL (ref 65–140)
GLUCOSE SERPL-MCNC: 209 MG/DL (ref 65–140)

## 2024-11-06 PROCEDURE — 99183 HYPERBARIC OXYGEN THERAPY: CPT | Performed by: FAMILY MEDICINE

## 2024-11-06 PROCEDURE — G0277 HBOT, FULL BODY CHAMBER, 30M: HCPCS | Performed by: FAMILY MEDICINE

## 2024-11-06 PROCEDURE — 82948 REAGENT STRIP/BLOOD GLUCOSE: CPT | Performed by: FAMILY MEDICINE

## 2024-11-06 NOTE — PROGRESS NOTES
HBO Treatment Course Details       Treatment Notes: Treatment tolerated well.  No complaints of pain or discomfort.  Dressing changed after treatment.  Small amount of serosanguinous drainage noted from the heal     Treatment Course Number: 7  Total Treatments Ordered:  40     Diagnosis:   1. Diabetic ulcer of left foot associated with type 2 diabetes mellitus, with muscle involvement without evidence of necrosis, unspecified part of foot (HCC)  [facility]          H[facility] Treatment Details:  In-Patient Visit: no  Treatment Length:90 Minutes(Minutes)  Chamber #: Hard sided Monoplace Chamber    Pre-Treatment details:  Pre-treatment protocol Treatment Protocol: 2.5 INDY X 90 minutes w/ 100% oxygen, two 5 minute air breaks  Left ear clear?: yes  Right ear clear?: yes  Left ear intact?: yes  Right ear intact?: yes  Left ear color: Translucent  Right ear color: Translucent  PE Tubes present, Left ear?: yes  PE Tubes present, Right ear?: yes  Left ear irrigated?: no  Right ear irrigated?: no  Left ear TEED scale: Grade 0  Right ear TEED scale: Grade 0   Pretreatment heart and lung assessment: Pretreatment heart and lung auscltation unremarkable. Patient cleared for HBOT     Treatment details:  INDY Rate: 2.5  Started Compression: 0819  Reached Compression: 0829  Total Compression Time: 10 (Minutes)  Total Holding Time: 100 (Minutes)  Started Decompression: 1009  Reached Surface: 1019  Total Decompression Time: 10 (Minutes)  Total Airbreaks:   (Minutes)  Total Time of Treatment: 120 (Minutes)  Symptoms Noted During Treatment: None (Minutes)    Post treatment details:  Left ear clear?: yes  Right ear clear?: yes  Left ears intact?: yes  Right ears intact?: yes  Left ear color: Translucent  Right ear color: Translucent  PE Tubes present, Left ear?: yes  PE Tubes present, Right ear?: yes        Left ear TEED scale: Grade 0  Right ear TEED scale: Grade 0  Post treatment heart and lung assessment: Post treatment heart  and lung auscltation unremarkable. Patient cleared for discharge. Tolerated treatment well.             Vital Signs:  [facility] Glucose Reference Range: 100-350 mg/dl   Pre-Treatment Post-Treatment   Time vitals are taken: 0800 Time vitals are taken: 1040   Blood Pressure: 147/76 Blood Pressure: 112/68   Pulse: 60 Pulse: 62   Resp: 18 Resp: 18   Temp: 97.7 °F (36.5 °C) Temp: 96.9 °F (36.1 °C)   Pre-Inspection Glucose readin Post-Inspection Glucose readin       Allergies   Allergen Reactions    Lisinopril Rash and Lip Swelling     Patient Active Problem List    Diagnosis Date Noted    Foot ulcer (HCC) 2024    SOB (shortness of breath) 2024    Nausea 2024    Elevated troponin 2024    History of DVT (deep vein thrombosis) 2024    Diabetic ulcer of toe of left foot associated with type 2 diabetes mellitus, limited to breakdown of skin ([facility]) 2024    Plantar fasciitis, right 07/10/2024    Acute deep vein thrombosis (DVT) of left peroneal vein ([facility]) 2024    Iron deficiency anemia 2024    Shortness of breath 2024    History of colon polyps 2024    Duodenal ulcer 2024    Multiple gastric ulcers 2023    Iron deficiency anemia due to chronic blood loss 2023    Melena 2023    Symptomatic anemia 2023    Frequent PVCs 2023    Acute on chronic diastolic heart failure (HCC) 2023    Stage 3b chronic kidney disease ([facility]) 2023    Diabetic ulcer of right midfoot associated with diabetes mellitus due to underlying condition, limited to breakdown of skin ([facility]) 06/15/2023    Hypertensive urgency 2023    Elevated troponin level not due myocardial infarction 2023    Stable angina pectoris ([facility]) 2023    Chronic kidney disease-mineral and bone disorder 2022    Mild aortic stenosis 2022    Chronic HFpEF/Moderate pHTN (HFpEF) ([facility]) 11/10/2022    Gross hematuria 2022    Platelets decreased ([facility])  07/22/2022    Acute kidney injury superimposed on chronic kidney disease  (HCC) 02/16/2022    Actinic keratoses 01/14/2022    Type 2 diabetes mellitus with diabetic peripheral angiopathy without gangrene, with long-term current use of insulin ([facility]) 01/11/2022    Varicose veins of left lower extremity 06/25/2021    History of endovascular stent graft for abdominal aortic aneurysm (AAA) 06/25/2021    Bruit (arterial) 06/25/2021    Peripheral artery disease (HCC) 06/25/2021    Type 2 diabetes mellitus with diabetic polyneuropathy, with long-term current use of insulin ([facility]) 06/10/2021    Mixed hyperlipidemia 05/11/2021    Primary hypertension 05/11/2021    Coronary artery disease involving native coronary artery of native heart without angina pectoris 05/11/2021    S/P angioplasty with stent 05/11/2021    Hx of CABG 05/11/2021    S/P AAA repair 05/11/2021    S/P prostatectomy 05/11/2021    H/O prostate cancer 05/11/2021     No orders of the defined types were placed in this encounter.

## 2024-11-07 ENCOUNTER — APPOINTMENT (OUTPATIENT)
Dept: WOUND CARE | Facility: HOSPITAL | Age: 79
End: 2024-11-07
Payer: COMMERCIAL

## 2024-11-07 ENCOUNTER — TELEPHONE (OUTPATIENT)
Age: 79
End: 2024-11-07

## 2024-11-07 ENCOUNTER — OFFICE VISIT (OUTPATIENT)
Dept: WOUND CARE | Facility: HOSPITAL | Age: 79
End: 2024-11-07
Payer: COMMERCIAL

## 2024-11-07 ENCOUNTER — OFFICE VISIT (OUTPATIENT)
Dept: OTOLARYNGOLOGY | Facility: CLINIC | Age: 79
End: 2024-11-07
Payer: COMMERCIAL

## 2024-11-07 VITALS
DIASTOLIC BLOOD PRESSURE: 69 MMHG | TEMPERATURE: 97.3 F | RESPIRATION RATE: 18 BRPM | SYSTOLIC BLOOD PRESSURE: 127 MMHG | HEART RATE: 73 BPM

## 2024-11-07 DIAGNOSIS — L97.525 DIABETIC ULCER OF LEFT FOOT ASSOCIATED WITH TYPE 2 DIABETES MELLITUS, WITH MUSCLE INVOLVEMENT WITHOUT EVIDENCE OF NECROSIS, UNSPECIFIED PART OF FOOT (HCC): ICD-10-CM

## 2024-11-07 DIAGNOSIS — H69.91 DYSFUNCTION OF RIGHT EUSTACHIAN TUBE: ICD-10-CM

## 2024-11-07 DIAGNOSIS — Z96.22 HISTORY OF TYMPANOSTOMY TUBE PLACEMENT: ICD-10-CM

## 2024-11-07 DIAGNOSIS — Z92.89 HISTORY OF HYPERBARIC OXYGEN THERAPY: Primary | ICD-10-CM

## 2024-11-07 DIAGNOSIS — E11.621 DIABETIC ULCER OF LEFT FOOT ASSOCIATED WITH TYPE 2 DIABETES MELLITUS, WITH MUSCLE INVOLVEMENT WITHOUT EVIDENCE OF NECROSIS, UNSPECIFIED PART OF FOOT (HCC): ICD-10-CM

## 2024-11-07 DIAGNOSIS — H90.A31 MIXED CONDUCTIVE AND SENSORINEURAL HEARING LOSS OF RIGHT EAR WITH RESTRICTED HEARING OF LEFT EAR: ICD-10-CM

## 2024-11-07 LAB
GLUCOSE SERPL-MCNC: 199 MG/DL (ref 65–140)
GLUCOSE SERPL-MCNC: 227 MG/DL (ref 65–140)

## 2024-11-07 PROCEDURE — G0277 HBOT, FULL BODY CHAMBER, 30M: HCPCS

## 2024-11-07 PROCEDURE — 99183 HYPERBARIC OXYGEN THERAPY: CPT | Performed by: PHYSICIAN ASSISTANT

## 2024-11-07 PROCEDURE — 99213 OFFICE O/P EST LOW 20 MIN: CPT | Performed by: OTOLARYNGOLOGY

## 2024-11-07 PROCEDURE — 82948 REAGENT STRIP/BLOOD GLUCOSE: CPT

## 2024-11-07 NOTE — TELEPHONE ENCOUNTER
Patient called to see if he can come in now for appointment due to he finished with his other appointment earlier. Advised patient Dr Gaspar had a full scheduled. Patient verbally understood.

## 2024-11-07 NOTE — PROGRESS NOTES
Assessment/Plan:  Both PE tube in place and patent.  Pt is tolerating HBO with no discomfort.  F/u in 6 months.      Diagnosis ICD-10-CM Associated Orders   1. History of hyperbaric oxygen therapy  Z92.89       2. Dysfunction of right eustachian tube  H69.91       3. Mixed conductive and sensorineural hearing loss of right ear with restricted hearing of left ear  H90.A31       4. History of tympanostomy tube placement  Z96.22              Subjective:      Patient ID: Thomas Horne is a 79 y.o. male.    F/u for BMT.  Pt has been tolerating the HBO well.        The following portions of the patient's history were reviewed and updated as appropriate: allergies, current medications, past family history, past medical history, past social history, past surgical history and problem list.    Review of Systems      Objective:      There were no vitals taken for this visit.         Physical Exam  Constitutional:       Appearance: He is well-developed.   HENT:      Head: Normocephalic and atraumatic.      Right Ear: Tympanic membrane, ear canal and external ear normal. No drainage. No middle ear effusion. A PE tube is present.      Left Ear: Tympanic membrane, ear canal and external ear normal. No drainage.  No middle ear effusion. A PE tube is present.      Nose: Nose normal.      Mouth/Throat:      Pharynx: Uvula midline. No oropharyngeal exudate.      Tonsils: 2+ on the right. 2+ on the left.   Neck:      Thyroid: No thyroid mass or thyromegaly.      Trachea: Trachea normal. No tracheal deviation.   Lymphadenopathy:      Cervical: No cervical adenopathy.   Neurological:      Mental Status: He is alert.

## 2024-11-07 NOTE — PROGRESS NOTES
HBO Treatment Course Details       Treatment Notes: Treatment tolerated well.  No complaints of pain or discomfort     Treatment Course Number: 8  Total Treatments Ordered:  40     Diagnosis:   1. Diabetic ulcer of left foot associated with type 2 diabetes mellitus, with muscle involvement without evidence of necrosis, unspecified part of foot (HCC)  [facility]          H[facility] Treatment Details:  In-Patient Visit: no  Treatment Length:90 Minutes(Minutes)  Chamber #: Hard sided Monoplace Chamber    Pre-Treatment details:  Pre-treatment protocol Treatment Protocol: 2.5 NIDY X 90 minutes w/ 100% oxygen, two 5 minute air breaks  Left ear clear?: yes  Right ear clear?: yes  Left ear intact?: yes  Right ear intact?: yes        PE Tubes present, Left ear?: yes  PE Tubes present, Right ear?: yes  Left ear irrigated?: no  Right ear irrigated?: no  Left ear TEED scale: Grade 0  Right ear TEED scale: Grade 0   Pretreatment heart and lung assessment: Pretreatment heart and lung auscltation unremarkable. Patient cleared for HBOT     Treatment details:  INDY Rate: 2.5  Started Compression: 0815  Reached Compression: 0825  Total Compression Time: 10 (Minutes)  Total Holding Time: 100 (Minutes)  Started Decompression: 1005  Reached Surface: 1015  Total Decompression Time: 10 (Minutes)  Total Airbreaks:   (Minutes)  Total Time of Treatment: 120 (Minutes)  Symptoms Noted During Treatment: None (Minutes)    Post treatment details:                                                    Vital Signs:  HBO Glucose Reference Range: 100-350 mg/dl   Pre-Treatment Post-Treatment   Time vitals are taken: 0800 Time vitals are taken: 1035   Blood Pressure: 127/69 Blood Pressure: 138/75   Pulse: 73 Pulse: 62   Resp: 18 Resp: 18   Temp: 97.3 °F (36.3 °C) Temp: 96.9 °F (36.1 °C)   Pre-Inspection Glucose readin Post-Inspection Glucose readin       Allergies   Allergen Reactions    Lisinopril Rash and Lip Swelling     Patient Active  Problem List    Diagnosis Date Noted    Foot ulcer (HCC) 09/07/2024    SOB (shortness of breath) 09/06/2024    Nausea 09/06/2024    Elevated troponin 09/06/2024    History of DVT (deep vein thrombosis) 08/26/2024    Diabetic ulcer of toe of left foot associated with type 2 diabetes mellitus, limited to breakdown of skin (HCC) 08/08/2024    Plantar fasciitis, right 07/10/2024    Acute deep vein thrombosis (DVT) of left peroneal vein ([facility]) 05/01/2024    Iron deficiency anemia 04/23/2024    Shortness of breath 04/09/2024    History of colon polyps 02/22/2024    Duodenal ulcer 02/01/2024    Multiple gastric ulcers 12/27/2023    Iron deficiency anemia due to chronic blood loss 12/27/2023    Melena 12/26/2023    Symptomatic anemia 12/26/2023    Frequent PVCs 06/17/2023    Acute on chronic diastolic heart failure ([facility]) 06/16/2023    Stage 3b chronic kidney disease ([facility]) 06/16/2023    Diabetic ulcer of right midfoot associated with diabetes mellitus due to underlying condition, limited to breakdown of skin ([facility]) 06/15/2023    Hypertensive urgency 05/11/2023    Elevated troponin level not due myocardial infarction 05/11/2023    Stable angina pectoris ([facility]) 03/07/2023    Chronic kidney disease-mineral and bone disorder 11/30/2022    Mild aortic stenosis 11/11/2022    Chronic HFpEF/Moderate pHTN (HFpEF) ([facility]) 11/10/2022    Gross hematuria 07/26/2022    Platelets decreased ([facility]) 07/22/2022    Acute kidney injury superimposed on chronic kidney disease  ([facility]) 02/16/2022    Actinic keratoses 01/14/2022    Type 2 diabetes mellitus with diabetic peripheral angiopathy without gangrene, with long-term current use of insulin (HCC) 01/11/2022    Varicose veins of left lower extremity 06/25/2021    History of endovascular stent graft for abdominal aortic aneurysm (AAA) 06/25/2021    Bruit (arterial) 06/25/2021    Peripheral artery disease (HCC) 06/25/2021    Type 2 diabetes mellitus with diabetic polyneuropathy, with long-term current  use of insulin (HCC) 06/10/2021    Mixed hyperlipidemia 05/11/2021    Primary hypertension 05/11/2021    Coronary artery disease involving native coronary artery of native heart without angina pectoris 05/11/2021    S/P angioplasty with stent 05/11/2021    Hx of CABG 05/11/2021    S/P AAA repair 05/11/2021    S/P prostatectomy 05/11/2021    H/O prostate cancer 05/11/2021     No orders of the defined types were placed in this encounter.

## 2024-11-08 ENCOUNTER — OFFICE VISIT (OUTPATIENT)
Dept: WOUND CARE | Facility: HOSPITAL | Age: 79
End: 2024-11-08
Payer: COMMERCIAL

## 2024-11-08 VITALS
SYSTOLIC BLOOD PRESSURE: 163 MMHG | TEMPERATURE: 96.9 F | RESPIRATION RATE: 16 BRPM | DIASTOLIC BLOOD PRESSURE: 80 MMHG | HEART RATE: 58 BPM

## 2024-11-08 VITALS
SYSTOLIC BLOOD PRESSURE: 143 MMHG | HEART RATE: 64 BPM | TEMPERATURE: 97 F | RESPIRATION RATE: 18 BRPM | DIASTOLIC BLOOD PRESSURE: 70 MMHG

## 2024-11-08 DIAGNOSIS — L97.511 DIABETIC ULCER OF TOE OF RIGHT FOOT ASSOCIATED WITH TYPE 2 DIABETES MELLITUS, LIMITED TO BREAKDOWN OF SKIN (HCC): ICD-10-CM

## 2024-11-08 DIAGNOSIS — L97.522 DIABETIC ULCER OF LEFT FOOT ASSOCIATED WITH TYPE 2 DIABETES MELLITUS, WITH FAT LAYER EXPOSED, UNSPECIFIED PART OF FOOT (HCC): ICD-10-CM

## 2024-11-08 DIAGNOSIS — E11.621 DIABETIC ULCER OF LEFT FOOT ASSOCIATED WITH TYPE 2 DIABETES MELLITUS, WITH FAT LAYER EXPOSED, UNSPECIFIED PART OF FOOT (HCC): ICD-10-CM

## 2024-11-08 DIAGNOSIS — L97.525 DIABETIC ULCER OF LEFT FOOT ASSOCIATED WITH TYPE 2 DIABETES MELLITUS, WITH MUSCLE INVOLVEMENT WITHOUT EVIDENCE OF NECROSIS, UNSPECIFIED PART OF FOOT (HCC): Primary | ICD-10-CM

## 2024-11-08 DIAGNOSIS — E11.621 DIABETIC ULCER OF LEFT FOOT ASSOCIATED WITH TYPE 2 DIABETES MELLITUS, WITH MUSCLE INVOLVEMENT WITHOUT EVIDENCE OF NECROSIS, UNSPECIFIED PART OF FOOT (HCC): ICD-10-CM

## 2024-11-08 DIAGNOSIS — E11.621 DIABETIC ULCER OF LEFT FOOT ASSOCIATED WITH TYPE 2 DIABETES MELLITUS, WITH MUSCLE INVOLVEMENT WITHOUT EVIDENCE OF NECROSIS, UNSPECIFIED PART OF FOOT (HCC): Primary | ICD-10-CM

## 2024-11-08 DIAGNOSIS — I73.9 PERIPHERAL ARTERY DISEASE (HCC): ICD-10-CM

## 2024-11-08 DIAGNOSIS — E11.621 DIABETIC ULCER OF TOE OF RIGHT FOOT ASSOCIATED WITH TYPE 2 DIABETES MELLITUS, LIMITED TO BREAKDOWN OF SKIN (HCC): ICD-10-CM

## 2024-11-08 DIAGNOSIS — L97.525 DIABETIC ULCER OF LEFT FOOT ASSOCIATED WITH TYPE 2 DIABETES MELLITUS, WITH MUSCLE INVOLVEMENT WITHOUT EVIDENCE OF NECROSIS, UNSPECIFIED PART OF FOOT (HCC): ICD-10-CM

## 2024-11-08 LAB
GLUCOSE SERPL-MCNC: 190 MG/DL (ref 65–140)
GLUCOSE SERPL-MCNC: 244 MG/DL (ref 65–140)

## 2024-11-08 PROCEDURE — G0277 HBOT, FULL BODY CHAMBER, 30M: HCPCS | Performed by: FAMILY MEDICINE

## 2024-11-08 PROCEDURE — 97597 DBRDMT OPN WND 1ST 20 CM/<: CPT | Performed by: FAMILY MEDICINE

## 2024-11-08 PROCEDURE — 11042 DBRDMT SUBQ TIS 1ST 20SQCM/<: CPT | Performed by: FAMILY MEDICINE

## 2024-11-08 PROCEDURE — 99183 HYPERBARIC OXYGEN THERAPY: CPT | Performed by: FAMILY MEDICINE

## 2024-11-08 PROCEDURE — 99214 OFFICE O/P EST MOD 30 MIN: CPT | Performed by: FAMILY MEDICINE

## 2024-11-08 PROCEDURE — 82948 REAGENT STRIP/BLOOD GLUCOSE: CPT | Performed by: FAMILY MEDICINE

## 2024-11-08 RX ORDER — LIDOCAINE 40 MG/G
CREAM TOPICAL ONCE
Status: COMPLETED | OUTPATIENT
Start: 2024-11-08 | End: 2024-11-08

## 2024-11-08 RX ADMIN — LIDOCAINE 1 APPLICATION: 40 CREAM TOPICAL at 11:06

## 2024-11-08 NOTE — PATIENT INSTRUCTIONS
Orders Placed This Encounter   Procedures    Wound cleansing and dressings     RIGHT GREAT TOE WOUND:    Wash your hands with soap and water.  Remove old dressing, discard into plastic bag and place in trash.  Cleanse the wound with saline prior to applying a clean dressing. Do not use tissue or cotton balls. Do not scrub the wound. Pat dry using gauze.  Shower no, unless able to keep wounds and dressings dry   Apply lotion to skin surrounding wound  Apply Silver alginate to the open wound.    Cover with gauze  Secure with rolled gauze and tape  Change dressing 3 times weekly after HBO    Off-loading Instructions:    Keep weight and pressure off wound at all times. and Wear off-loading OOFOS slides are ok as directed by your physician. Put on immediately when rising in the morning and remove when going to bed.      LEFT HEEL WOUND:        Wash your hands with soap and water. Remove old dressing, discard into plastic bag and place in trash. Cleanse the wound with saline moistened gauze then saline prior to applying a clean dressing. Do not use tissue or cotton balls. Do not scrub the wound. Pat dry using gauze.      Shower no, unless able to keep wounds dry.      Apply lotion to skin surrounding wound   Apply Silver alginate to the open wound. (Tuck into undermining at 1-3 o'clock)  Cover with abd   Secure with rolled gauze and tape      Change dressing 3 times weekly after HBO     LEFT TOE      Wash your hands with soap and water.  Remove old dressing, discard into plastic bag and place in trash.  Cleanse the wound with saline moistened gauze prior to applying a clean dressing. Do not use tissue or cotton balls. Do not scrub the wound. Pat dry using gauze.  Shower no, unless able to keep wounds dry     Apply dermagran  Cover with gauze  Secure with tape  Change dressing 3 times weekly after HBO    LEFT LATERAL FOOT WOUND:     Wash your hands with soap and water.  Remove old dressing, discard into plastic bag and  place in trash.  Cleanse the wound with saline prior to applying a clean dressing. Do not use tissue or cotton balls. Do not scrub the wound. Pat dry using gauze.  Shower yes, if able to keep wounds and dressings dry.    Cover and secure with bordered foam dressing  Change dressing 3 times weekly after HBO       Off-loading Instructions: Wear off-loading device as directed by your physician (Globo Ped). Put on immediately when rising in the morning and remove when going to bed and Turn every 2 hours. Avoid position directing pressure to Wound site. Limit side lying to 30 degree tilt. Limit the head of bed elevation to 30 degrees. Limit walking and standing to only necessity for activities of daily living.      Wound infection: If you have signs of infection please call the wound center. If the wound center is closedplease go to the Emergency department. Some signs of infection: fever, chills, increased redness, red streaks, increase in pain, increased drainage. Drainage with an odor, Change in drainage color: white/milky/green/tan/yellow, an increase in swelling, chest pain and/or shortness of breath.     Protein: Eat protein with each meal to promote healing. Examples of protein are fish, meat, chicken, nuts, peanut butter, eggs, lentils, edamame or a protein shake.     Standing Status:   Future     Standing Expiration Date:   11/15/2024

## 2024-11-08 NOTE — PROGRESS NOTES
"Debridement   Wound 08/15/24 Diabetic Ulcer Plantar Left;Lateral    Universal Protocol:  procedure performed by consultantConsent: Verbal consent obtained.  Consent given by: patient  Time out: Immediately prior to procedure a \"time out\" was called to verify the correct patient, procedure, equipment, support staff and site/side marked as required.  Timeout called at: 11/8/2024 11:00 AM.  Patient understanding: patient states understanding of the procedure being performed  Patient identity confirmed: verbally with patient    Debridement Details  Performed by: physician  Debridement type: selective  Pain control: lidocaine 4%      Post-debridement measurements  Length (cm): 0.2  Width (cm): 0.2  Depth (cm): 0.1  Percent debrided: 100%  Surface Area (cm^2): 0.04  Area Debrided (cm^2): 0.04  Volume (cm^3): 0    Devitalized tissue debrided: biofilm and callus  Instrument(s) utilized: curette  Bleeding: small  Hemostasis obtained with: pressure  Response to treatment: procedure was tolerated well        "

## 2024-11-08 NOTE — PROGRESS NOTES
"Patient ID: Thomas Horne is a 79 y.o. male Date of Birth 1945     Chief Complaint  Chief Complaint   Patient presents with    Follow Up Wound Care Visit     Left foot       Allergies  Lisinopril    Assessment:    No problem-specific Assessment & Plan notes found for this encounter.       Diagnoses and all orders for this visit:    Diabetic ulcer of left foot associated with type 2 diabetes mellitus, with muscle involvement without evidence of necrosis, unspecified part of foot (HCC)  -     lidocaine (LMX) 4 % cream  -     Cancel: Wound cleansing and dressings; Future  -     Cancel: Wound cleansing and dressings; Future  -     Wound cleansing and dressings; Future  -     Wound Procedure Treatment  -     Wound Procedure Treatment Diabetic Ulcer Left;Lateral Plantar  -     Wound Procedure Treatment Diabetic Ulcer Anterior;Left Toe D2, second  -     Debridement Diabetic Ulcer Left Heel  -     Debridement Diabetic Ulcer Left;Lateral Plantar    Peripheral artery disease (HCC)    Diabetic ulcer of toe of right foot associated with type 2 diabetes mellitus, limited to breakdown of skin (HCC)  -     Debridement Diabetic Ulcer Right;Posterior Toe D1, great    Diabetic ulcer of left foot associated with type 2 diabetes mellitus, with fat layer exposed, unspecified part of foot (HCC)  -     Debridement Diabetic Ulcer Left;Lateral Plantar              Debridement   Wound 08/15/24 Diabetic Ulcer Heel Left    Universal Protocol:  procedure performed by consultantConsent: Verbal consent obtained.  Consent given by: patient  Time out: Immediately prior to procedure a \"time out\" was called to verify the correct patient, procedure, equipment, support staff and site/side marked as required.  Timeout called at: 11/8/2024 11:00 AM.  Patient understanding: patient states understanding of the procedure being performed  Patient identity confirmed: verbally with patient    Debridement Details  Performed by: physician  Debridement type: " "surgical  Level of debridement: subcutaneous tissue  Pain control: lidocaine 4%      Post-debridement measurements  Length (cm): 1.6  Width (cm): 2.9  Depth (cm): 0.8  Percent debrided: 100%  Surface Area (cm^2): 4.64  Area Debrided (cm^2): 4.64  Volume (cm^3): 3.71    Tissue and other material debrided: subcutaneous tissue  Devitalized tissue debrided: exudate and slough  Instrument(s) utilized: curette  Bleeding: small  Hemostasis obtained with: pressure  Response to treatment: procedure was tolerated well    Debridement   Wound 08/15/24 Diabetic Ulcer Plantar Left;Lateral    Universal Protocol:  procedure performed by consultantConsent: Verbal consent obtained.  Consent given by: patient  Time out: Immediately prior to procedure a \"time out\" was called to verify the correct patient, procedure, equipment, support staff and site/side marked as required.  Timeout called at: 11/8/2024 11:00 AM.  Patient understanding: patient states understanding of the procedure being performed  Patient identity confirmed: verbally with patient    Debridement Details  Performed by: physician  Debridement type: surgical  Level of debridement: subcutaneous tissue  Pain control: lidocaine 4%      Post-debridement measurements  Length (cm): 0.2  Width (cm): 0.2  Depth (cm): 0.3  Percent debrided: 100%  Surface Area (cm^2): 0.04  Area Debrided (cm^2): 0.04  Volume (cm^3): 0.01    Tissue and other material debrided: subcutaneous tissue  Devitalized tissue debrided: exudate and slough  Instrument(s) utilized: curette  Bleeding: small  Hemostasis obtained with: pressure  Response to treatment: procedure was tolerated well    Debridement   Wound 11/08/24 Diabetic Ulcer Toe D1, great Right;Posterior    Universal Protocol:  procedure performed by consultantConsent: Verbal consent obtained.  Consent given by: patient  Time out: Immediately prior to procedure a \"time out\" was called to verify the correct patient, procedure, equipment, support " staff and site/side marked as required.  Timeout called at: 11/8/2024 11:00 AM.  Patient understanding: patient states understanding of the procedure being performed  Patient identity confirmed: verbally with patient    Debridement Details  Performed by: physician  Debridement type: surgical  Level of debridement: subcutaneous tissue  Pain control: lidocaine 4%      Post-debridement measurements  Length (cm): 0.6  Width (cm): 1  Depth (cm): 0.2  Percent debrided: 100%  Surface Area (cm^2): 0.6  Area Debrided (cm^2): 0.6  Volume (cm^3): 0.12    Tissue and other material debrided: subcutaneous tissue  Devitalized tissue debrided: exudate and slough  Instrument(s) utilized: curette  Bleeding: small  Hemostasis obtained with: pressure  Response to treatment: procedure was tolerated well        Plan:  New wound on the right great toe  Left lateral foot wound is improved, almost closed  Left toe wound continues to callus over with trapped fluid beneath  Left heel wound is minimally worse with slight undermining noted  Wounds debrided as above  Wound management as below  Extensively discussed the importance of tight diabetic control and proper offloading.  Extensively discussed the importance of minimizing ambulation.  Continue HBO treatments  Follow-up in 1 week or call sooner with questions or concerns    Wound 08/15/24 Diabetic Ulcer Heel Left (Active)   Enter Sumner score: Sumner Grade 3: Deep abscess, OM, or joint sepsis 11/08/24 1048   Wound Image Images linked 11/08/24 1137   Wound Description Pink;Yellow;White 11/08/24 1048   Clara-wound Assessment Dry;Scaly;Callus 11/08/24 1048   Wound Length (cm) 1.6 cm 11/08/24 1048   Wound Width (cm) 2.9 cm 11/08/24 1048   Wound Depth (cm) 0.7 cm 11/08/24 1048   Wound Surface Area (cm^2) 4.64 cm^2 11/08/24 1048   Wound Volume (cm^3) 3.248 cm^3 11/08/24 1048   Calculated Wound Volume (cm^3) 3.25 cm^3 11/08/24 1048   Change in Wound Size % -1705.56 11/08/24 1048   Undermining 1  0.5 11/08/24 1048   Undermining 1 is depth extending from 1-3 11/08/24 1048   Drainage Amount Moderate 11/08/24 1048   Drainage Description Serosanguineous;Yellow 11/08/24 1048   Non-staged Wound Description Full thickness 11/08/24 1048   Dressing Status Intact (upon arrival) 11/08/24 1048       Wound 08/15/24 Diabetic Ulcer Plantar Left;Lateral (Active)   Enter Sumner score: Sumner Grade 2: Deep ulcer extended to ligament, tendon, joint capsule, bone, or deep fascia without abscess or osteomyelitis (OM) 11/08/24 1047   Wound Image Images linked 11/08/24 1047   Wound Description Dry;Pink;Brown 11/08/24 1047   Clara-wound Assessment Dry 11/08/24 1047   Wound Length (cm) 0.2 cm 11/08/24 1047   Wound Width (cm) 0.2 cm 11/08/24 1047   Wound Depth (cm) 0.1 cm 11/08/24 1047   Wound Surface Area (cm^2) 0.04 cm^2 11/08/24 1047   Wound Volume (cm^3) 0.004 cm^3 11/08/24 1047   Calculated Wound Volume (cm^3) 0 cm^3 11/08/24 1047   Change in Wound Size % 100 11/08/24 1047   Drainage Amount Scant 11/08/24 1047   Drainage Description Serosanguineous 11/08/24 1047   Non-staged Wound Description Full thickness 11/08/24 1047   Dressing Status Intact (upon arrival) 11/08/24 1047       Wound 08/15/24 Diabetic Ulcer Toe D2, second Anterior;Left (Active)   Enter Sumner score: Sumner Grade 3: Deep abscess, OM, or joint sepsis 11/08/24 1102   Wound Image Images linked 11/08/24 1137   Wound Description Epithelialization;White;Pink 11/08/24 1102   Clara-wound Assessment Dry 11/08/24 1102   Wound Length (cm) 0.2 cm 11/08/24 1102   Wound Width (cm) 0.2 cm 11/08/24 1102   Wound Depth (cm) 0.2 cm 11/08/24 1102   Wound Surface Area (cm^2) 0.04 cm^2 11/08/24 1102   Wound Volume (cm^3) 0.008 cm^3 11/08/24 1102   Calculated Wound Volume (cm^3) 0.01 cm^3 11/08/24 1102   Change in Wound Size % 97.62 11/08/24 1102   Drainage Amount Small 11/08/24 1102   Drainage Description Yellow;Bloody 11/08/24 1102   Non-staged Wound Description Full thickness  11/08/24 1102   Dressing Status Intact (upon arrival) 11/08/24 1102       Wound 11/08/24 Diabetic Ulcer Toe D1, great Right;Posterior (Active)   Enter Sumner score: Sumner Grade 1: Partial or full-thickness ulcer (superficial) 11/08/24 1054   Wound Image Images linked 11/08/24 1138   Wound Description Brown;Yellow 11/08/24 1054   Clara-wound Assessment Dry 11/08/24 1054   Wound Length (cm) 0.6 cm 11/08/24 1054   Wound Width (cm) 1 cm 11/08/24 1054   Wound Depth (cm) 0.1 cm 11/08/24 1054   Wound Surface Area (cm^2) 0.6 cm^2 11/08/24 1054   Wound Volume (cm^3) 0.06 cm^3 11/08/24 1054   Calculated Wound Volume (cm^3) 0.06 cm^3 11/08/24 1054   Drainage Amount Small 11/08/24 1054   Drainage Description Sanguineous 11/08/24 1054   Non-staged Wound Description Full thickness 11/08/24 1054   Dressing Status Other (Comment) (no dressing upon arrival) 11/08/24 1054       Wound 08/15/24 Diabetic Ulcer Heel Left (Active)   Date First Assessed/Time First Assessed: 08/15/24 0943   Primary Wound Type: Diabetic Ulcer  Location: Heel  Wound Location Orientation: Left       Wound 08/15/24 Diabetic Ulcer Plantar Left;Lateral (Active)   Date First Assessed/Time First Assessed: 08/15/24 0943   Primary Wound Type: Diabetic Ulcer  Location: Plantar  Wound Location Orientation: Left;Lateral       Wound 08/15/24 Diabetic Ulcer Toe D2, second Anterior;Left (Active)   Date First Assessed/Time First Assessed: 08/15/24 0943   Primary Wound Type: Diabetic Ulcer  Location: Toe D2, second  Wound Location Orientation: Anterior;Left       Wound 11/08/24 Diabetic Ulcer Toe D1, great Right;Posterior (Active)   Date First Assessed/Time First Assessed: 11/08/24 1043   Present on Original Admission: Yes  Primary Wound Type: Diabetic Ulcer  Location: Toe D1, great  Wound Location Orientation: Right;Posterior       [REMOVED] Wound 12/21/23 Diabetic Ulcer Ankle Posterior;Right (Removed)   Resolved Date: 08/15/24  Date First Assessed/Time First Assessed:  12/21/23 0954   Primary Wound Type: Diabetic Ulcer  Location: Ankle  Wound Location Orientation: Posterior;Right  Wound Description (Comments): MACDONALD 2  Wound Outcome: Unknown (No long...       [REMOVED] Wound 08/07/24 Groin Left (Removed)   Resolved Date: 08/22/24  Date First Assessed/Time First Assessed: 08/07/24 0927   Location: Groin  Wound Location Orientation: Left  Incision's 1st Dressing: ADHESIVE SKIN HIGH VISCOSITY EXOFIN PRECISION PEN (x0)  Wound Outcome: Unknown (No longer pre...       [REMOVED] Wound 08/30/24 Leg Left (Removed)   Resolved Date: 09/30/24  Date First Assessed/Time First Assessed: 08/30/24 1124   Location: Leg  Wound Location Orientation: Left  Wound Description (Comments): UPPER LEG AND LOWER LEG- MEPILEX  Incision's 1st Dressing: DRESSING MEPILEX AG BORDER POST...       [REMOVED] Wound 09/06/24 Pretibial Left;Proximal (Removed)   Resolved Date: 09/30/24  Date First Assessed/Time First Assessed: 09/06/24 1751   Location: Pretibial  Wound Location Orientation: Left;Proximal  Wound Outcome: (c) Other (Comment)       [REMOVED] Wound 09/06/24 Thigh Anterior;Left (Removed)   Resolved Date: 09/16/24  Date First Assessed/Time First Assessed: 09/06/24 1752   Location: Thigh  Wound Location Orientation: Anterior;Left  Wound Outcome: (c) Other (Comment)       [REMOVED] Wound 09/06/24 Ankle Anterior;Left (Removed)   Resolved Date: 09/09/24  Date First Assessed/Time First Assessed: 09/06/24 1804   Location: Ankle  Wound Location Orientation: Anterior;Left  Wound Outcome: Unknown (No longer present)       [REMOVED] Wound 09/06/24 Toe D2, second Anterior;Left (Removed)   Resolved Date: 09/09/24  Date First Assessed/Time First Assessed: 09/06/24 1804   Location: Toe D2, second  Wound Location Orientation: Anterior;Left  Wound Outcome: Unknown (No longer present)       [REMOVED] Wound 09/06/24 Pedal Anterior;Left (Removed)   Resolved Date: 09/09/24  Date First Assessed/Time First Assessed: 09/06/24 1805    Location: Pedal  Wound Location Orientation: Anterior;Left  Wound Outcome: Unknown (No longer present)       Subjective:      .    Patient presents for follow-up of diabetic ulcers of the left foot.  No increased pain or drainage.  Patient currently undergoing hyperbaric oxygen therapy for his Sumner 3 DFU's.  Has been using silver alginate on the heel wound and Dermagran on the toe and lateral foot wounds. Patient has a new wound on the right great toe that was just noted this morning.        The following portions of the patient's history were reviewed and updated as appropriate: He  has a past medical history of Anemia, CAD (coronary artery disease), Callus, Cancer ([facility]), CHF (congestive heart failure) ([facility]), Chronic kidney disease, Clotting disorder ([facility]), Coronary artery disease (1988), Deep vein thrombosis ([facility]), Diabetes mellitus ([facility]), Difficulty walking, Duodenal ulcer, Ear problems, Heart disease (1988), HL (hearing loss), Hyperlipidemia, Hypertension, Myocardial infarction ([facility]), Neuropathy, Neuropathy in diabetes ([facility]), Plantar fasciitis, and Sleep apnea.  He   Patient Active Problem List    Diagnosis Date Noted    Foot ulcer ([facility]) 09/07/2024    SOB (shortness of breath) 09/06/2024    Nausea 09/06/2024    Elevated troponin 09/06/2024    History of DVT (deep vein thrombosis) 08/26/2024    Diabetic ulcer of toe of left foot associated with type 2 diabetes mellitus, limited to breakdown of skin ([facility]) 08/08/2024    Plantar fasciitis, right 07/10/2024    Acute deep vein thrombosis (DVT) of left peroneal vein ([facility]) 05/01/2024    Iron deficiency anemia 04/23/2024    Shortness of breath 04/09/2024    History of colon polyps 02/22/2024    Duodenal ulcer 02/01/2024    Multiple gastric ulcers 12/27/2023    Iron deficiency anemia due to chronic blood loss 12/27/2023    Melena 12/26/2023    Symptomatic anemia 12/26/2023    Frequent PVCs 06/17/2023    Acute on chronic diastolic heart failure (HCC) 06/16/2023     Stage 3b chronic kidney disease ([facility]) 06/16/2023    Diabetic ulcer of right midfoot associated with diabetes mellitus due to underlying condition, limited to breakdown of skin ([facility]) 06/15/2023    Hypertensive urgency 05/11/2023    Elevated troponin level not due myocardial infarction 05/11/2023    Stable angina pectoris ([facility]) 03/07/2023    Chronic kidney disease-mineral and bone disorder 11/30/2022    Mild aortic stenosis 11/11/2022    Chronic HFpEF/Moderate pHTN (HFpEF) ([facility]) 11/10/2022    Gross hematuria 07/26/2022    Platelets decreased ([facility]) 07/22/2022    Acute kidney injury superimposed on chronic kidney disease  ([facility]) 02/16/2022    Actinic keratoses 01/14/2022    Type 2 diabetes mellitus with diabetic peripheral angiopathy without gangrene, with long-term current use of insulin ([facility]) 01/11/2022    Varicose veins of left lower extremity 06/25/2021    History of endovascular stent graft for abdominal aortic aneurysm (AAA) 06/25/2021    Bruit (arterial) 06/25/2021    Peripheral artery disease ([facility]) 06/25/2021    Type 2 diabetes mellitus with diabetic polyneuropathy, with long-term current use of insulin ([facility]) 06/10/2021    Mixed hyperlipidemia 05/11/2021    Primary hypertension 05/11/2021    Coronary artery disease involving native coronary artery of native heart without angina pectoris 05/11/2021    S/P angioplasty with stent 05/11/2021    Hx of CABG 05/11/2021    S/P AAA repair 05/11/2021    S/P prostatectomy 05/11/2021    H/O prostate cancer 05/11/2021     He  reports that he quit smoking about 36 years ago. His smoking use included cigarettes. He started smoking about 68 years ago. He has a 49.5 pack-year smoking history. He has been exposed to tobacco smoke. He has never used smokeless tobacco. He reports current alcohol use of about 5.0 standard drinks of alcohol per week. He reports that he does not use drugs.  He is allergic to lisinopril..    Review of Systems   Constitutional:  Negative for chills and  fever.   HENT:  Negative for congestion and sneezing.    Respiratory:  Negative for cough.    Cardiovascular:  Positive for leg swelling.   Musculoskeletal:  Positive for gait problem.   Skin:  Positive for wound.   Psychiatric/Behavioral:  Negative for agitation.          Objective:       Wound 08/15/24 Diabetic Ulcer Heel Left (Active)   Enter Sumner score: Sumner Grade 3: Deep abscess, OM, or joint sepsis 11/08/24 1048   Wound Image Images linked 11/08/24 1137   Wound Description Pink;Yellow;White 11/08/24 1048   Clara-wound Assessment Dry;Scaly;Callus 11/08/24 1048   Wound Length (cm) 1.6 cm 11/08/24 1048   Wound Width (cm) 2.9 cm 11/08/24 1048   Wound Depth (cm) 0.7 cm 11/08/24 1048   Wound Surface Area (cm^2) 4.64 cm^2 11/08/24 1048   Wound Volume (cm^3) 3.248 cm^3 11/08/24 1048   Calculated Wound Volume (cm^3) 3.25 cm^3 11/08/24 1048   Change in Wound Size % -1705.56 11/08/24 1048   Undermining 1 0.5 11/08/24 1048   Undermining 1 is depth extending from 1-3 11/08/24 1048   Drainage Amount Moderate 11/08/24 1048   Drainage Description Serosanguineous;Yellow 11/08/24 1048   Non-staged Wound Description Full thickness 11/08/24 1048   Dressing Status Intact (upon arrival) 11/08/24 1048       Wound 08/15/24 Diabetic Ulcer Plantar Left;Lateral (Active)   Enter Sumner score: Sumner Grade 2: Deep ulcer extended to ligament, tendon, joint capsule, bone, or deep fascia without abscess or osteomyelitis (OM) 11/08/24 1047   Wound Image Images linked 11/08/24 1047   Wound Description Dry;Pink;Brown 11/08/24 1047   Clara-wound Assessment Dry 11/08/24 1047   Wound Length (cm) 0.2 cm 11/08/24 1047   Wound Width (cm) 0.2 cm 11/08/24 1047   Wound Depth (cm) 0.1 cm 11/08/24 1047   Wound Surface Area (cm^2) 0.04 cm^2 11/08/24 1047   Wound Volume (cm^3) 0.004 cm^3 11/08/24 1047   Calculated Wound Volume (cm^3) 0 cm^3 11/08/24 1047   Change in Wound Size % 100 11/08/24 1047   Drainage Amount Scant 11/08/24 1047   Drainage  Description Serosanguineous 11/08/24 1047   Non-staged Wound Description Full thickness 11/08/24 1047   Dressing Status Intact (upon arrival) 11/08/24 1047       Wound 08/15/24 Diabetic Ulcer Toe D2, second Anterior;Left (Active)   Enter Sumner score: Sumner Grade 3: Deep abscess, OM, or joint sepsis 11/08/24 1102   Wound Image Images linked 11/08/24 1137   Wound Description Epithelialization;White;Pink 11/08/24 1102   Clara-wound Assessment Dry 11/08/24 1102   Wound Length (cm) 0.2 cm 11/08/24 1102   Wound Width (cm) 0.2 cm 11/08/24 1102   Wound Depth (cm) 0.2 cm 11/08/24 1102   Wound Surface Area (cm^2) 0.04 cm^2 11/08/24 1102   Wound Volume (cm^3) 0.008 cm^3 11/08/24 1102   Calculated Wound Volume (cm^3) 0.01 cm^3 11/08/24 1102   Change in Wound Size % 97.62 11/08/24 1102   Drainage Amount Small 11/08/24 1102   Drainage Description Yellow;Bloody 11/08/24 1102   Non-staged Wound Description Full thickness 11/08/24 1102   Dressing Status Intact (upon arrival) 11/08/24 1102       Wound 11/08/24 Diabetic Ulcer Toe D1, great Right;Posterior (Active)   Enter Sumner score: Sumner Grade 1: Partial or full-thickness ulcer (superficial) 11/08/24 1054   Wound Image Images linked 11/08/24 1138   Wound Description Brown;Yellow 11/08/24 1054   Clara-wound Assessment Dry 11/08/24 1054   Wound Length (cm) 0.6 cm 11/08/24 1054   Wound Width (cm) 1 cm 11/08/24 1054   Wound Depth (cm) 0.1 cm 11/08/24 1054   Wound Surface Area (cm^2) 0.6 cm^2 11/08/24 1054   Wound Volume (cm^3) 0.06 cm^3 11/08/24 1054   Calculated Wound Volume (cm^3) 0.06 cm^3 11/08/24 1054   Drainage Amount Small 11/08/24 1054   Drainage Description Sanguineous 11/08/24 1054   Non-staged Wound Description Full thickness 11/08/24 1054   Dressing Status Other (Comment) (no dressing upon arrival) 11/08/24 1054       /80   Pulse 58   Temp (!) 96.9 °F (36.1 °C)   Resp 16     Physical Exam  Vitals reviewed.   Constitutional:       General: He is not in acute  distress.     Appearance: Normal appearance. He is not ill-appearing, toxic-appearing or diaphoretic.   HENT:      Head: Normocephalic and atraumatic.      Right Ear: External ear normal.      Left Ear: External ear normal.   Eyes:      Conjunctiva/sclera: Conjunctivae normal.   Pulmonary:      Effort: Pulmonary effort is normal. No respiratory distress.   Musculoskeletal:      Cervical back: Neck supple.   Skin:     Comments: See wound assessment   Neurological:      Mental Status: He is alert.   Psychiatric:         Mood and Affect: Mood normal.         Behavior: Behavior normal.         Wound 08/15/24 Diabetic Ulcer Heel Left (Active)   Enter Sumner score: Sumner Grade 3: Deep abscess, OM, or joint sepsis 11/08/24 1048   Wound Image   11/08/24 1137   Wound Description Pink;Yellow;White 11/08/24 1048   Clara-wound Assessment Dry;Scaly;Callus 11/08/24 1048   Wound Length (cm) 1.6 cm 11/08/24 1048   Wound Width (cm) 2.9 cm 11/08/24 1048   Wound Depth (cm) 0.7 cm 11/08/24 1048   Wound Surface Area (cm^2) 4.64 cm^2 11/08/24 1048   Wound Volume (cm^3) 3.248 cm^3 11/08/24 1048   Calculated Wound Volume (cm^3) 3.25 cm^3 11/08/24 1048   Change in Wound Size % -1705.56 11/08/24 1048   Number of underminings 1 10/30/24 1128   Undermining 1 0.5 11/08/24 1048   Undermining 1 is depth extending from 1-3 11/08/24 1048   Drainage Amount Moderate 11/08/24 1048   Drainage Description Serosanguineous;Yellow 11/08/24 1048   Non-staged Wound Description Full thickness 11/08/24 1048   Dressing Status Intact 11/08/24 1048       Wound 08/15/24 Diabetic Ulcer Plantar Left;Lateral (Active)   Enter Sumner score: Sumner Grade 2: Deep ulcer extended to ligament, tendon, joint capsule, bone, or deep fascia without abscess or osteomyelitis (OM) 11/08/24 1047   Wound Image   11/08/24 1047   Wound Description Dry;Pink;Brown 11/08/24 1047   Clara-wound Assessment Dry 11/08/24 1047   Wound Length (cm) 0.2 cm 11/08/24 1047   Wound Width (cm) 0.2 cm  11/08/24 1047   Wound Depth (cm) 0.1 cm 11/08/24 1047   Wound Surface Area (cm^2) 0.04 cm^2 11/08/24 1047   Wound Volume (cm^3) 0.004 cm^3 11/08/24 1047   Calculated Wound Volume (cm^3) 0 cm^3 11/08/24 1047   Change in Wound Size % 100 11/08/24 1047   Drainage Amount Scant 11/08/24 1047   Drainage Description Serosanguineous 11/08/24 1047   Non-staged Wound Description Full thickness 11/08/24 1047   Dressing Status Intact 11/08/24 1047       Wound 08/15/24 Diabetic Ulcer Toe D2, second Anterior;Left (Active)   Enter Sumner score: Sumner Grade 3: Deep abscess, OM, or joint sepsis 11/08/24 1102   Wound Image   11/08/24 1137   Wound Description Epithelialization;White;Pink 11/08/24 1102   Clara-wound Assessment Dry 11/08/24 1102   Wound Length (cm) 0.2 cm 11/08/24 1102   Wound Width (cm) 0.2 cm 11/08/24 1102   Wound Depth (cm) 0.2 cm 11/08/24 1102   Wound Surface Area (cm^2) 0.04 cm^2 11/08/24 1102   Wound Volume (cm^3) 0.008 cm^3 11/08/24 1102   Calculated Wound Volume (cm^3) 0.01 cm^3 11/08/24 1102   Change in Wound Size % 97.62 11/08/24 1102   Number of underminings 1 09/30/24 0906   Undermining 1 0 10/15/24 0829   Undermining 1 is depth extending from 12-12 10/07/24 1112   Drainage Amount Small 11/08/24 1102   Drainage Description Yellow;Bloody 11/08/24 1102   Non-staged Wound Description Full thickness 11/08/24 1102   Dressing Status Intact 11/08/24 1102       Wound 11/08/24 Diabetic Ulcer Toe D1, great Right;Posterior (Active)   Enter Sumner score: Sumner Grade 1: Partial or full-thickness ulcer (superficial) 11/08/24 1054   Wound Image   11/08/24 1138   Wound Description Brown;Yellow 11/08/24 1054   Clara-wound Assessment Dry 11/08/24 1054   Wound Length (cm) 0.6 cm 11/08/24 1054   Wound Width (cm) 1 cm 11/08/24 1054   Wound Depth (cm) 0.1 cm 11/08/24 1054   Wound Surface Area (cm^2) 0.6 cm^2 11/08/24 1054   Wound Volume (cm^3) 0.06 cm^3 11/08/24 1054   Calculated Wound Volume (cm^3) 0.06 cm^3 11/08/24 1054    Drainage Amount Small 11/08/24 1054   Drainage Description Sanguineous 11/08/24 1054   Non-staged Wound Description Full thickness 11/08/24 1054   Dressing Status Other (Comment) 11/08/24 1054                           Wound Instructions:  Orders Placed This Encounter   Procedures    Wound cleansing and dressings     RIGHT GREAT TOE WOUND:    Wash your hands with soap and water.  Remove old dressing, discard into plastic bag and place in trash.  Cleanse the wound with saline prior to applying a clean dressing. Do not use tissue or cotton balls. Do not scrub the wound. Pat dry using gauze.  Shower no, unless able to keep wounds and dressings dry   Apply lotion to skin surrounding wound  Apply Silver alginate to the open wound.    Cover with gauze  Secure with rolled gauze and tape  Change dressing 3 times weekly after HBO    Off-loading Instructions:    Keep weight and pressure off wound at all times. and Wear off-loading OOFOS slides are ok as directed by your physician. Put on immediately when rising in the morning and remove when going to bed.      LEFT HEEL WOUND:        Wash your hands with soap and water. Remove old dressing, discard into plastic bag and place in trash. Cleanse the wound with saline moistened gauze then saline prior to applying a clean dressing. Do not use tissue or cotton balls. Do not scrub the wound. Pat dry using gauze.      Shower no, unless able to keep wounds dry.      Apply lotion to skin surrounding wound   Apply Silver alginate to the open wound. (Tuck into undermining at 1-3 o'clock)  Cover with abd   Secure with rolled gauze and tape      Change dressing 3 times weekly after HBO     LEFT TOE      Wash your hands with soap and water.  Remove old dressing, discard into plastic bag and place in trash.  Cleanse the wound with saline moistened gauze prior to applying a clean dressing. Do not use tissue or cotton balls. Do not scrub the wound. Pat dry using gauze.  Shower no, unless  able to keep wounds dry     Apply dermagran  Cover with gauze  Secure with tape  Change dressing 3 times weekly after HBO    LEFT LATERAL FOOT WOUND:     Wash your hands with soap and water.  Remove old dressing, discard into plastic bag and place in trash.  Cleanse the wound with saline prior to applying a clean dressing. Do not use tissue or cotton balls. Do not scrub the wound. Pat dry using gauze.  Shower yes, if able to keep wounds and dressings dry.    Cover and secure with bordered foam dressing  Change dressing 3 times weekly after HBO       Off-loading Instructions: Wear off-loading device as directed by your physician (Dinho Ped). Put on immediately when rising in the morning and remove when going to bed and Turn every 2 hours. Avoid position directing pressure to Wound site. Limit side lying to 30 degree tilt. Limit the head of bed elevation to 30 degrees. Limit walking and standing to only necessity for activities of daily living.      Wound infection: If you have signs of infection please call the wound center. If the wound center is closedplease go to the Emergency department. Some signs of infection: fever, chills, increased redness, red streaks, increase in pain, increased drainage. Drainage with an odor, Change in drainage color: white/milky/green/tan/yellow, an increase in swelling, chest pain and/or shortness of breath.     Protein: Eat protein with each meal to promote healing. Examples of protein are fish, meat, chicken, nuts, peanut butter, eggs, lentils, edamame or a protein shake.     Standing Status:   Future     Standing Expiration Date:   11/15/2024    Wound Procedure Treatment     This order was created via procedure documentation    Wound Procedure Treatment Diabetic Ulcer Left;Lateral Plantar     This order was created via procedure documentation    Wound Procedure Treatment Diabetic Ulcer Anterior;Left Toe D2, second     This order was created via procedure documentation    Debridement  Diabetic Ulcer Left Heel     This order was created via procedure documentation    Debridement Diabetic Ulcer Left;Lateral Plantar     This order was created via procedure documentation    Debridement Diabetic Ulcer Right;Posterior Toe D1, great     This order was created via procedure documentation    Debridement Diabetic Ulcer Left;Lateral Plantar     This order was created via procedure documentation        Diagnosis ICD-10-CM Associated Orders   1. Diabetic ulcer of left foot associated with type 2 diabetes mellitus, with muscle involvement without evidence of necrosis, unspecified part of foot ([facility])  E11.621 lidocaine (LMX) 4 % cream    L97.525 Wound cleansing and dressings     Wound Procedure Treatment     Wound Procedure Treatment Diabetic Ulcer Left;Lateral Plantar     Wound Procedure Treatment Diabetic Ulcer Anterior;Left Toe D2, second     Debridement Diabetic Ulcer Left Heel     Debridement Diabetic Ulcer Left;Lateral Plantar      2. Peripheral artery disease ([facility])  I73.9       3. Diabetic ulcer of toe of right foot associated with type 2 diabetes mellitus, limited to breakdown of skin ([facility])  E11.621 Debridement Diabetic Ulcer Right;Posterior Toe D1, great    L97.511       4. Diabetic ulcer of left foot associated with type 2 diabetes mellitus, with fat layer exposed, unspecified part of foot ([facility])  E11.621 Debridement Diabetic Ulcer Left;Lateral Plantar    L97.522

## 2024-11-08 NOTE — PROGRESS NOTES
HBO Treatment Course Details       Treatment Notes: Treatment tolerated well.  No complaints of pain or discomfort.       Treatment Course Number: 9  Total Treatments Ordered:  40     Diagnosis:   1. Diabetic ulcer of left foot associated with type 2 diabetes mellitus, with muscle involvement without evidence of necrosis, unspecified part of foot (HCC)  [facility]          H[facility] Treatment Details:  In-Patient Visit: no  Treatment Length:90 Minutes(Minutes)  Chamber #: Hard sided Monoplace Chamber    Pre-Treatment details:  Pre-treatment protocol Treatment Protocol: 2.5 INDY X 90 minutes w/ 100% oxygen, two 5 minute air breaks  Left ear clear?: yes  Right ear clear?: yes              PE Tubes present, Left ear?: yes  PE Tubes present, Right ear?: yes        Left ear TEED scale: Grade 0  Right ear TEED scale: Grade 0   Pretreatment heart and lung assessment: Pretreatment heart and lung auscltation unremarkable. Patient cleared for HBOT     Treatment details:  INDY Rate: 2.5  Started Compression: 0819  Reached Compression: 0829  Total Compression Time: 10 (Minutes)  Total Holding Time: 100 (Minutes)  Started Decompression: 1009  Reached Surface: 1019  Total Decompression Time: 10 (Minutes)  Total Airbreaks:   (Minutes)  Total Time of Treatment: 120 (Minutes)  Symptoms Noted During Treatment: None (Minutes)    Post treatment details:  Left ear clear?: yes  Right ear clear?: yes              PE Tubes present, Left ear?: yes  PE Tubes present, Right ear?: yes        Left ear TEED scale: Grade 0  Right ear TEED scale: Grade 0  Post treatment heart and lung assessment: Post treatment heart and lung auscltation unremarkable. Patient cleared for discharge. Tolerated treatment well.             Vital Signs:  HBO Glucose Reference Range: 100-350 mg/dl   Pre-Treatment Post-Treatment   Time vitals are taken: 0800 Time vitals are taken: 1025   Blood Pressure: 143/70 Blood Pressure: 163/80   Pulse: 64 Pulse: 58   Resp:  18 Resp: 18   Temp: 97 °F (36.1 °C) Temp: 96.9 °F (36.1 °C)   Pre-Inspection Glucose readin Post-Inspection Glucose readin       Allergies   Allergen Reactions    Lisinopril Rash and Lip Swelling     Patient Active Problem List    Diagnosis Date Noted    Foot ulcer (HCC) 2024    SOB (shortness of breath) 2024    Nausea 2024    Elevated troponin 2024    History of DVT (deep vein thrombosis) 2024    Diabetic ulcer of toe of left foot associated with type 2 diabetes mellitus, limited to breakdown of skin ([facility]) 2024    Plantar fasciitis, right 07/10/2024    Acute deep vein thrombosis (DVT) of left peroneal vein ([facility]) 2024    Iron deficiency anemia 2024    Shortness of breath 2024    History of colon polyps 2024    Duodenal ulcer 2024    Multiple gastric ulcers 2023    Iron deficiency anemia due to chronic blood loss 2023    Melena 2023    Symptomatic anemia 2023    Frequent PVCs 2023    Acute on chronic diastolic heart failure ([facility]) 2023    Stage 3b chronic kidney disease ([facility]) 2023    Diabetic ulcer of right midfoot associated with diabetes mellitus due to underlying condition, limited to breakdown of skin ([facility]) 06/15/2023    Hypertensive urgency 2023    Elevated troponin level not due myocardial infarction 2023    Stable angina pectoris ([facility]) 2023    Chronic kidney disease-mineral and bone disorder 2022    Mild aortic stenosis 2022    Chronic HFpEF/Moderate pHTN (HFpEF) ([facility]) 11/10/2022    Gross hematuria 2022    Platelets decreased ([facility]) 2022    Acute kidney injury superimposed on chronic kidney disease  ([facility]) 2022    Actinic keratoses 2022    Type 2 diabetes mellitus with diabetic peripheral angiopathy without gangrene, with long-term current use of insulin ([facility]) 2022    Varicose veins of left lower extremity 2021    History of  endovascular stent graft for abdominal aortic aneurysm (AAA) 06/25/2021    Bruit (arterial) 06/25/2021    Peripheral artery disease (HCC) 06/25/2021    Type 2 diabetes mellitus with diabetic polyneuropathy, with long-term current use of insulin (HCC) 06/10/2021    Mixed hyperlipidemia 05/11/2021    Primary hypertension 05/11/2021    Coronary artery disease involving native coronary artery of native heart without angina pectoris 05/11/2021    S/P angioplasty with stent 05/11/2021    Hx of CABG 05/11/2021    S/P AAA repair 05/11/2021    S/P prostatectomy 05/11/2021    H/O prostate cancer 05/11/2021     No orders of the defined types were placed in this encounter.

## 2024-11-08 NOTE — PROGRESS NOTES
Wound Procedure Treatment    Performed by: Nadege Pinzon RN  Authorized by: Nitza Sotelo DO    Associated wounds:   Wound 08/15/24 Diabetic Ulcer Heel Left  Wound 11/08/24 Diabetic Ulcer Toe D1, great Right;Posterior  Wound cleansed with:  NSS  Applied primary dressing:  Silver and Calcium alginate  Applied secondary dressing:  Gauze  Dressing secured with:  Ender and Tape  Wound Procedure Treatment Diabetic Ulcer Left;Lateral Plantar    Performed by: Nadege Pinzon RN  Authorized by: Nitza Sotelo DO    Associated wounds:   Wound 08/15/24 Diabetic Ulcer Plantar Left;Lateral  Wound cleansed with:  NSS  Applied primary dressing:  Silicone bordered foam  Wound Procedure Treatment Diabetic Ulcer Anterior;Left Toe D2, second    Performed by: Nadege Pinzon RN  Authorized by: Nitza Sotelo DO    Associated wounds:   Wound 08/15/24 Diabetic Ulcer Toe D2, second Anterior;Left  Wound cleansed with:  NSS  Applied primary dressing:  Dermagran  Applied secondary dressing:  Gauze  Dressing secured with:  Tape and Ender

## 2024-11-11 ENCOUNTER — OFFICE VISIT (OUTPATIENT)
Dept: WOUND CARE | Facility: HOSPITAL | Age: 79
End: 2024-11-11
Payer: COMMERCIAL

## 2024-11-11 DIAGNOSIS — E11.621 DIABETIC ULCER OF LEFT FOOT ASSOCIATED WITH TYPE 2 DIABETES MELLITUS, WITH MUSCLE INVOLVEMENT WITHOUT EVIDENCE OF NECROSIS, UNSPECIFIED PART OF FOOT (HCC): ICD-10-CM

## 2024-11-11 DIAGNOSIS — L97.525 DIABETIC ULCER OF LEFT FOOT ASSOCIATED WITH TYPE 2 DIABETES MELLITUS, WITH MUSCLE INVOLVEMENT WITHOUT EVIDENCE OF NECROSIS, UNSPECIFIED PART OF FOOT (HCC): ICD-10-CM

## 2024-11-11 LAB
GLUCOSE SERPL-MCNC: 199 MG/DL (ref 65–140)
GLUCOSE SERPL-MCNC: 218 MG/DL (ref 65–140)

## 2024-11-11 PROCEDURE — 82948 REAGENT STRIP/BLOOD GLUCOSE: CPT

## 2024-11-11 PROCEDURE — G0277 HBOT, FULL BODY CHAMBER, 30M: HCPCS

## 2024-11-11 PROCEDURE — 99183 HYPERBARIC OXYGEN THERAPY: CPT | Performed by: PHYSICIAN ASSISTANT

## 2024-11-11 NOTE — PROGRESS NOTES
HBO Treatment Course Details       Treatment Notes: Patient tolerated HBOT well.  Wound care to BLE done post dive.      Treatment Course Number: 10  Total Treatments Ordered:  40     Diagnosis:   1. Diabetic ulcer of left foot associated with type 2 diabetes mellitus, with muscle involvement without evidence of necrosis, unspecified part of foot (HCC)  Hyperbaric oxygen [facility]          HBO Treatment Details:  In-Patient Visit: no  Treatment Length:90 Minutes(Minutes)  Chamber #: Hard sided Monoplace Chamber    Pre-Treatment details:  Pre-treatment protocol Treatment Protocol: 2.5 INDY X 90 minutes w/ 100% oxygen, two 5 minute air breaks  Left ear clear?: yes  Right ear clear?: yes  Left ear intact?: yes  Right ear intact?: yes        PE Tubes present, Left ear?: yes  PE Tubes present, Right ear?: yes  Left ear irrigated?: no  Right ear irrigated?: no  Left ear TEED scale: Grade 0  Right ear TEED scale: Grade 0   Pretreatment heart and lung assessment: Pretreatment heart and lung auscltation unremarkable. Patient cleared for HBOT     Treatment details:  INDY Rate: 2.5  Started Compression: 0834  Reached Compression: 0844  Total Compression Time: 10 (Minutes)  Total Holding Time: 100 (Minutes)  Started Decompression: 1024  Reached Surface: 1034  Total Decompression Time: 10 (Minutes)  Total Airbreaks:   (Minutes)  Total Time of Treatment: 120 (Minutes)  Symptoms Noted During Treatment: None (Minutes)    Post treatment details:  Left ear clear?: yes  Right ear clear?: no (some serosanguinous fluid in the canal abutting the TM)  Left ears intact?: yes  Right ears intact?: yes        PE Tubes present, Left ear?: yes  PE Tubes present, Right ear?: yes        Left ear TEED scale: Grade I  Right ear TEED scale: Grade I  Post treatment heart and lung assessment: Post treatment heart and lung auscltation unremarkable. Patient cleared for discharge. Tolerated treatment well.             Vital Signs:  HBO Glucose Reference  Range: 100-350 mg/dl   Pre-Treatment Post-Treatment   Time vitals are taken: 0815 Time vitals are taken: 1035   Blood Pressure: 168/80 Blood Pressure: 195/81 (asymptomatic)   Pulse: 64 Pulse: 50   Resp: 16 Resp: 16   Temp: 97.7 °F (36.5 °C) Temp: 97.4 °F (36.3 °C)   Pre-Inspection Glucose readin Post-Inspection Glucose readin       Allergies   Allergen Reactions    Lisinopril Rash and Lip Swelling     Patient Active Problem List    Diagnosis Date Noted    Foot ulcer (HCC) 2024    SOB (shortness of breath) 2024    Nausea 2024    Elevated troponin 2024    History of DVT (deep vein thrombosis) 2024    Diabetic ulcer of toe of left foot associated with type 2 diabetes mellitus, limited to breakdown of skin ([facility]) 2024    Plantar fasciitis, right 07/10/2024    Acute deep vein thrombosis (DVT) of left peroneal vein ([facility]) 2024    Iron deficiency anemia 2024    Shortness of breath 2024    History of colon polyps 2024    Duodenal ulcer 2024    Multiple gastric ulcers 2023    Iron deficiency anemia due to chronic blood loss 2023    Melena 2023    Symptomatic anemia 2023    Frequent PVCs 2023    Acute on chronic diastolic heart failure (HCC) 2023    Stage 3b chronic kidney disease ([facility]) 2023    Diabetic ulcer of right midfoot associated with diabetes mellitus due to underlying condition, limited to breakdown of skin ([facility]) 06/15/2023    Hypertensive urgency 2023    Elevated troponin level not due myocardial infarction 2023    Stable angina pectoris ([facility]) 2023    Chronic kidney disease-mineral and bone disorder 2022    Mild aortic stenosis 2022    Chronic HFpEF/Moderate pHTN (HFpEF) ([facility]) 11/10/2022    Gross hematuria 2022    Platelets decreased ([facility]) 2022    Acute kidney injury superimposed on chronic kidney disease  ([facility]) 2022    Actinic keratoses 2022     Type 2 diabetes mellitus with diabetic peripheral angiopathy without gangrene, with long-term current use of insulin ([facility]) 01/11/2022    Varicose veins of left lower extremity 06/25/2021    History of endovascular stent graft for abdominal aortic aneurysm (AAA) 06/25/2021    Bruit (arterial) 06/25/2021    Peripheral artery disease (HCC) 06/25/2021    Type 2 diabetes mellitus with diabetic polyneuropathy, with long-term current use of insulin ([facility]) 06/10/2021    Mixed hyperlipidemia 05/11/2021    Primary hypertension 05/11/2021    Coronary artery disease involving native coronary artery of native heart without angina pectoris 05/11/2021    S/P angioplasty with stent 05/11/2021    Hx of CABG 05/11/2021    S/P AAA repair 05/11/2021    S/P prostatectomy 05/11/2021    H/O prostate cancer 05/11/2021     No orders of the defined types were placed in this encounter.

## 2024-11-12 ENCOUNTER — OFFICE VISIT (OUTPATIENT)
Dept: WOUND CARE | Facility: HOSPITAL | Age: 79
End: 2024-11-12
Payer: COMMERCIAL

## 2024-11-12 VITALS
RESPIRATION RATE: 18 BRPM | HEART RATE: 62 BPM | SYSTOLIC BLOOD PRESSURE: 133 MMHG | DIASTOLIC BLOOD PRESSURE: 66 MMHG | TEMPERATURE: 97.8 F

## 2024-11-12 DIAGNOSIS — E11.621 DIABETIC ULCER OF LEFT FOOT ASSOCIATED WITH TYPE 2 DIABETES MELLITUS, WITH MUSCLE INVOLVEMENT WITHOUT EVIDENCE OF NECROSIS, UNSPECIFIED PART OF FOOT (HCC): ICD-10-CM

## 2024-11-12 DIAGNOSIS — L97.525 DIABETIC ULCER OF LEFT FOOT ASSOCIATED WITH TYPE 2 DIABETES MELLITUS, WITH MUSCLE INVOLVEMENT WITHOUT EVIDENCE OF NECROSIS, UNSPECIFIED PART OF FOOT (HCC): ICD-10-CM

## 2024-11-12 LAB
GLUCOSE SERPL-MCNC: 178 MG/DL (ref 65–140)
GLUCOSE SERPL-MCNC: 197 MG/DL (ref 65–140)

## 2024-11-12 PROCEDURE — G0277 HBOT, FULL BODY CHAMBER, 30M: HCPCS | Performed by: FAMILY MEDICINE

## 2024-11-12 PROCEDURE — 82948 REAGENT STRIP/BLOOD GLUCOSE: CPT | Performed by: FAMILY MEDICINE

## 2024-11-12 PROCEDURE — 99183 HYPERBARIC OXYGEN THERAPY: CPT | Performed by: FAMILY MEDICINE

## 2024-11-12 NOTE — PROGRESS NOTES
HBO Treatment Course Details       Treatment Notes: Treatment tolerated well.  No complaints of pain or discomfort     Treatment Course Number: 11  Total Treatments Ordered:  40     Diagnosis:   1. Diabetic ulcer of left foot associated with type 2 diabetes mellitus, with muscle involvement without evidence of necrosis, unspecified part of foot (HCC)  [facility]          HBO Treatment Details:  In-Patient Visit: no  Treatment Length:90 Minutes(Minutes)  Chamber #: Hard sided Monoplace Chamber    Pre-Treatment details:  Pre-treatment protocol Treatment Protocol: 2.5 INDY X 90 minutes w/ 100% oxygen, two 5 minute air breaks  Left ear clear?: yes  Right ear clear?: yes  Left ear intact?: yes  Right ear intact?: yes        PE Tubes present, Left ear?: yes  PE Tubes present, Right ear?: yes  Left ear irrigated?: no  Right ear irrigated?: no  Left ear TEED scale: Grade 0  Right ear TEED scale: Grade 0   Pretreatment heart and lung assessment: Pretreatment heart and lung auscltation unremarkable. Patient cleared for HBOT     Treatment details:  INDY Rate: 2.5  Started Compression: 0817  Reached Compression: 0827  Total Compression Time: 10 (Minutes)  Total Holding Time: 100 (Minutes)  Started Decompression: 1007  Reached Surface: 1017  Total Decompression Time: 10 (Minutes)  Total Airbreaks:   (Minutes)  Total Time of Treatment: 120 (Minutes)  Symptoms Noted During Treatment: None (Minutes)    Post treatment details:  Left ear clear?: yes  Right ear clear?: yes              PE Tubes present, Left ear?: yes  PE Tubes present, Right ear?: yes        Left ear TEED scale: Grade 0  Right ear TEED scale: Grade 0  Post treatment heart and lung assessment: Post treatment heart and lung auscltation unremarkable. Patient cleared for discharge. Tolerated treatment well.             Vital Signs:  HBO Glucose Reference Range: 100-350 mg/dl   Pre-Treatment Post-Treatment   Time vitals are taken: 0810 Time vitals are taken:  1020   Blood Pressure: 133/66 Blood Pressure: 148/66   Pulse: 62 Pulse: 60   Resp: 18 Resp: 16   Temp: 97.8 °F (36.6 °C) Temp: 96.6 °F (35.9 °C)   Pre-Inspection Glucose readin Post-Inspection Glucose readin       Allergies   Allergen Reactions    Lisinopril Rash and Lip Swelling     Patient Active Problem List    Diagnosis Date Noted    Foot ulcer (HCC) 2024    SOB (shortness of breath) 2024    Nausea 2024    Elevated troponin 2024    History of DVT (deep vein thrombosis) 2024    Diabetic ulcer of toe of left foot associated with type 2 diabetes mellitus, limited to breakdown of skin ([facility]) 2024    Plantar fasciitis, right 07/10/2024    Acute deep vein thrombosis (DVT) of left peroneal vein ([facility]) 2024    Iron deficiency anemia 2024    Shortness of breath 2024    History of colon polyps 2024    Duodenal ulcer 2024    Multiple gastric ulcers 2023    Iron deficiency anemia due to chronic blood loss 2023    Melena 2023    Symptomatic anemia 2023    Frequent PVCs 2023    Acute on chronic diastolic heart failure ([facility]) 2023    Stage 3b chronic kidney disease ([facility]) 2023    Diabetic ulcer of right midfoot associated with diabetes mellitus due to underlying condition, limited to breakdown of skin ([facility]) 06/15/2023    Hypertensive urgency 2023    Elevated troponin level not due myocardial infarction 2023    Stable angina pectoris ([facility]) 2023    Chronic kidney disease-mineral and bone disorder 2022    Mild aortic stenosis 2022    Chronic HFpEF/Moderate pHTN (HFpEF) ([facility]) 11/10/2022    Gross hematuria 2022    Platelets decreased ([facility]) 2022    Acute kidney injury superimposed on chronic kidney disease  ([facility]) 2022    Actinic keratoses 2022    Type 2 diabetes mellitus with diabetic peripheral angiopathy without gangrene, with long-term current use of insulin  (HCC) 01/11/2022    Varicose veins of left lower extremity 06/25/2021    History of endovascular stent graft for abdominal aortic aneurysm (AAA) 06/25/2021    Bruit (arterial) 06/25/2021    Peripheral artery disease (HCC) 06/25/2021    Type 2 diabetes mellitus with diabetic polyneuropathy, with long-term current use of insulin ([facility]) 06/10/2021    Mixed hyperlipidemia 05/11/2021    Primary hypertension 05/11/2021    Coronary artery disease involving native coronary artery of native heart without angina pectoris 05/11/2021    S/P angioplasty with stent 05/11/2021    Hx of CABG 05/11/2021    S/P AAA repair 05/11/2021    S/P prostatectomy 05/11/2021    H/O prostate cancer 05/11/2021     No orders of the defined types were placed in this encounter.

## 2024-11-13 ENCOUNTER — OFFICE VISIT (OUTPATIENT)
Dept: WOUND CARE | Facility: HOSPITAL | Age: 79
End: 2024-11-13
Payer: COMMERCIAL

## 2024-11-13 VITALS
SYSTOLIC BLOOD PRESSURE: 145 MMHG | HEART RATE: 65 BPM | DIASTOLIC BLOOD PRESSURE: 78 MMHG | RESPIRATION RATE: 18 BRPM | TEMPERATURE: 97.8 F

## 2024-11-13 DIAGNOSIS — L97.525 DIABETIC ULCER OF LEFT FOOT ASSOCIATED WITH TYPE 2 DIABETES MELLITUS, WITH MUSCLE INVOLVEMENT WITHOUT EVIDENCE OF NECROSIS, UNSPECIFIED PART OF FOOT (HCC): ICD-10-CM

## 2024-11-13 DIAGNOSIS — E11.621 DIABETIC ULCER OF LEFT FOOT ASSOCIATED WITH TYPE 2 DIABETES MELLITUS, WITH MUSCLE INVOLVEMENT WITHOUT EVIDENCE OF NECROSIS, UNSPECIFIED PART OF FOOT (HCC): ICD-10-CM

## 2024-11-13 LAB
GLUCOSE SERPL-MCNC: 175 MG/DL (ref 65–140)
GLUCOSE SERPL-MCNC: 192 MG/DL (ref 65–140)

## 2024-11-13 PROCEDURE — 99183 HYPERBARIC OXYGEN THERAPY: CPT | Performed by: FAMILY MEDICINE

## 2024-11-13 PROCEDURE — G0277 HBOT, FULL BODY CHAMBER, 30M: HCPCS | Performed by: FAMILY MEDICINE

## 2024-11-13 PROCEDURE — 82948 REAGENT STRIP/BLOOD GLUCOSE: CPT | Performed by: FAMILY MEDICINE

## 2024-11-13 NOTE — PROGRESS NOTES
HBO Treatment Course Details       Treatment Notes: Treatment tolerated well.  No complaints of pian or discomfort.  Dressings changed as ordered.  No signs or symptoms of infection.     Treatment Course Number: 12  Total Treatments Ordered:  40     Diagnosis:   1. Diabetic ulcer of left foot associated with type 2 diabetes mellitus, with muscle involvement without evidence of necrosis, unspecified part of foot (HCC)  [facility]          HBO Treatment Details:  In-Patient Visit: no  Treatment Length:90 Minutes(Minutes)  Chamber #: Hard sided Monoplace Chamber    Pre-Treatment details:  Pre-treatment protocol Treatment Protocol: 2.5 INDY X 90 minutes w/ 100% oxygen, two 5 minute air breaks  Left ear clear?: yes  Right ear clear?: yes  Left ear intact?: yes  Right ear intact?: yes        PE Tubes present, Left ear?: yes  PE Tubes present, Right ear?: yes  Left ear irrigated?: no  Right ear irrigated?: no  Left ear TEED scale: Grade 0  Right ear TEED scale: Grade 0   Pretreatment heart and lung assessment: Pretreatment heart and lung auscltation unremarkable. Patient cleared for HBOT     Treatment details:  INDY Rate: 2.5  Started Compression: 0825  Reached Compression: 0835  Total Compression Time: 10 (Minutes)  Total Holding Time: 100 (Minutes)  Started Decompression: 1015  Reached Surface: 1025  Total Decompression Time: 10 (Minutes)  Total Airbreaks:   (Minutes)  Total Time of Treatment: 120 (Minutes)  Symptoms Noted During Treatment: None (Minutes)    Post treatment details:  Left ear clear?: yes  Right ear clear?: yes              PE Tubes present, Left ear?: yes  PE Tubes present, Right ear?: yes        Left ear TEED scale: Grade 0  Right ear TEED scale: Grade 0  Post treatment heart and lung assessment: Post treatment heart and lung auscltation unremarkable. Patient cleared for discharge. Tolerated treatment well.             Vital Signs:  [facility] Glucose Reference Range: 100-350 mg/dl   Pre-Treatment  Post-Treatment   Time vitals are taken: 0810 Time vitals are taken: 1030   Blood Pressure: 145/78 Blood Pressure: 160/79   Pulse: 65 Pulse: 56   Resp: 18 Resp: 18   Temp: 97.8 °F (36.6 °C) Temp: 97.2 °F (36.2 °C)   Pre-Inspection Glucose readin Post-Inspection Glucose readin       Allergies   Allergen Reactions    Lisinopril Rash and Lip Swelling     Patient Active Problem List    Diagnosis Date Noted    Foot ulcer (HCC) 2024    SOB (shortness of breath) 2024    Nausea 2024    Elevated troponin 2024    History of DVT (deep vein thrombosis) 2024    Diabetic ulcer of toe of left foot associated with type 2 diabetes mellitus, limited to breakdown of skin ([facility]) 2024    Plantar fasciitis, right 07/10/2024    Acute deep vein thrombosis (DVT) of left peroneal vein ([facility]) 2024    Iron deficiency anemia 2024    Shortness of breath 2024    History of colon polyps 2024    Duodenal ulcer 2024    Multiple gastric ulcers 2023    Iron deficiency anemia due to chronic blood loss 2023    Melena 2023    Symptomatic anemia 2023    Frequent PVCs 2023    Acute on chronic diastolic heart failure (HCC) 2023    Stage 3b chronic kidney disease ([facility]) 2023    Diabetic ulcer of right midfoot associated with diabetes mellitus due to underlying condition, limited to breakdown of skin ([facility]) 06/15/2023    Hypertensive urgency 2023    Elevated troponin level not due myocardial infarction 2023    Stable angina pectoris ([facility]) 2023    Chronic kidney disease-mineral and bone disorder 2022    Mild aortic stenosis 2022    Chronic HFpEF/Moderate pHTN (HFpEF) ([facility]) 11/10/2022    Gross hematuria 2022    Platelets decreased ([facility]) 2022    Acute kidney injury superimposed on chronic kidney disease  ([facility]) 2022    Actinic keratoses 2022    Type 2 diabetes mellitus with diabetic peripheral  angiopathy without gangrene, with long-term current use of insulin ([facility]) 01/11/2022    Varicose veins of left lower extremity 06/25/2021    History of endovascular stent graft for abdominal aortic aneurysm (AAA) 06/25/2021    Bruit (arterial) 06/25/2021    Peripheral artery disease ([facility]) 06/25/2021    Type 2 diabetes mellitus with diabetic polyneuropathy, with long-term current use of insulin ([facility]) 06/10/2021    Mixed hyperlipidemia 05/11/2021    Primary hypertension 05/11/2021    Coronary artery disease involving native coronary artery of native heart without angina pectoris 05/11/2021    S/P angioplasty with stent 05/11/2021    Hx of CABG 05/11/2021    S/P AAA repair 05/11/2021    S/P prostatectomy 05/11/2021    H/O prostate cancer 05/11/2021     No orders of the defined types were placed in this encounter.

## 2024-11-14 ENCOUNTER — OFFICE VISIT (OUTPATIENT)
Dept: WOUND CARE | Facility: HOSPITAL | Age: 79
End: 2024-11-14
Payer: COMMERCIAL

## 2024-11-14 VITALS — SYSTOLIC BLOOD PRESSURE: 138 MMHG | DIASTOLIC BLOOD PRESSURE: 74 MMHG | HEART RATE: 60 BPM | RESPIRATION RATE: 18 BRPM

## 2024-11-14 DIAGNOSIS — L97.525 DIABETIC ULCER OF LEFT FOOT ASSOCIATED WITH TYPE 2 DIABETES MELLITUS, WITH MUSCLE INVOLVEMENT WITHOUT EVIDENCE OF NECROSIS, UNSPECIFIED PART OF FOOT (HCC): ICD-10-CM

## 2024-11-14 DIAGNOSIS — E11.621 DIABETIC ULCER OF LEFT FOOT ASSOCIATED WITH TYPE 2 DIABETES MELLITUS, WITH MUSCLE INVOLVEMENT WITHOUT EVIDENCE OF NECROSIS, UNSPECIFIED PART OF FOOT (HCC): ICD-10-CM

## 2024-11-14 LAB
GLUCOSE SERPL-MCNC: 146 MG/DL (ref 65–140)
GLUCOSE SERPL-MCNC: 156 MG/DL (ref 65–140)

## 2024-11-14 PROCEDURE — 82948 REAGENT STRIP/BLOOD GLUCOSE: CPT | Performed by: FAMILY MEDICINE

## 2024-11-14 PROCEDURE — 99183 HYPERBARIC OXYGEN THERAPY: CPT | Performed by: FAMILY MEDICINE

## 2024-11-14 PROCEDURE — G0277 HBOT, FULL BODY CHAMBER, 30M: HCPCS | Performed by: FAMILY MEDICINE

## 2024-11-14 NOTE — PROGRESS NOTES
HBO Treatment Course Details       Treatment Notes: Treatment tolerated well.  No complaints of pain or discomfort.     Treatment Course Number: 13  Total Treatments Ordered:  40     Diagnosis:   1. Diabetic ulcer of left foot associated with type 2 diabetes mellitus, with muscle involvement without evidence of necrosis, unspecified part of foot (HCC)  Hyperbaric oxygen thearpy    Hyperbaric oxygen thearpy          HBO Treatment Details:  In-Patient Visit: no  Treatment Length:90 Minutes(Minutes)  Chamber #: Hard sided Monoplace Chamber    Pre-Treatment details:  Pre-treatment protocol Treatment Protocol: 2.5 INDY X 90 minutes w/ 100% oxygen, two 5 minute air breaks                                             Treatment details:  INDY Rate: 2.5  Started Compression: 0824  Reached Compression: 0834  Total Compression Time: 10 (Minutes)  Total Holding Time: 100 (Minutes)  Started Decompression: 1014  Reached Surface: 1024  Total Decompression Time: 10 (Minutes)  Total Airbreaks:   (Minutes)  Total Time of Treatment: 120 (Minutes)  Symptoms Noted During Treatment: None (Minutes)    Post treatment details:                                                    Vital Signs:  HBO Glucose Reference Range: 100-350 mg/dl   Pre-Treatment Post-Treatment   Time vitals are taken: 0800 Time vitals are taken: 1030   Blood Pressure: 138/74 Blood Pressure: 151/70   Pulse: 60 Pulse: 66   Resp: 18 Resp: 18   Temp: 96.6 °F (35.9 °C) Temp: 97.6 °F (36.4 °C)   Pre-Inspection Glucose readin Post-Inspection Glucose readin       Allergies   Allergen Reactions    Lisinopril Rash and Lip Swelling     Patient Active Problem List    Diagnosis Date Noted    Foot ulcer (HCC) 2024    SOB (shortness of breath) 2024    Nausea 2024    Elevated troponin 2024    History of DVT (deep vein thrombosis) 2024    Diabetic ulcer of toe of left foot associated with type 2 diabetes mellitus, limited to breakdown of skin (HCC)  08/08/2024    Plantar fasciitis, right 07/10/2024    Acute deep vein thrombosis (DVT) of left peroneal vein (HCC) 05/01/2024    Iron deficiency anemia 04/23/2024    Shortness of breath 04/09/2024    History of colon polyps 02/22/2024    Duodenal ulcer 02/01/2024    Multiple gastric ulcers 12/27/2023    Iron deficiency anemia due to chronic blood loss 12/27/2023    Melena 12/26/2023    Symptomatic anemia 12/26/2023    Frequent PVCs 06/17/2023    Acute on chronic diastolic heart failure (HCC) 06/16/2023    Stage 3b chronic kidney disease ([facility]) 06/16/2023    Diabetic ulcer of right midfoot associated with diabetes mellitus due to underlying condition, limited to breakdown of skin ([facility]) 06/15/2023    Hypertensive urgency 05/11/2023    Elevated troponin level not due myocardial infarction 05/11/2023    Stable angina pectoris ([facility]) 03/07/2023    Chronic kidney disease-mineral and bone disorder 11/30/2022    Mild aortic stenosis 11/11/2022    Chronic HFpEF/Moderate pHTN (HFpEF) ([facility]) 11/10/2022    Gross hematuria 07/26/2022    Platelets decreased ([facility]) 07/22/2022    Acute kidney injury superimposed on chronic kidney disease  ([facility]) 02/16/2022    Actinic keratoses 01/14/2022    Type 2 diabetes mellitus with diabetic peripheral angiopathy without gangrene, with long-term current use of insulin ([facility]) 01/11/2022    Varicose veins of left lower extremity 06/25/2021    History of endovascular stent graft for abdominal aortic aneurysm (AAA) 06/25/2021    Bruit (arterial) 06/25/2021    Peripheral artery disease (HCC) 06/25/2021    Type 2 diabetes mellitus with diabetic polyneuropathy, with long-term current use of insulin ([facility]) 06/10/2021    Mixed hyperlipidemia 05/11/2021    Primary hypertension 05/11/2021    Coronary artery disease involving native coronary artery of native heart without angina pectoris 05/11/2021    S/P angioplasty with stent 05/11/2021    Hx of CABG 05/11/2021    S/P AAA repair 05/11/2021    S/P prostatectomy  05/11/2021    H/O prostate cancer 05/11/2021     No orders of the defined types were placed in this encounter.

## 2024-11-15 ENCOUNTER — OFFICE VISIT (OUTPATIENT)
Dept: WOUND CARE | Facility: HOSPITAL | Age: 79
End: 2024-11-15
Payer: COMMERCIAL

## 2024-11-15 VITALS
HEART RATE: 61 BPM | DIASTOLIC BLOOD PRESSURE: 78 MMHG | SYSTOLIC BLOOD PRESSURE: 164 MMHG | TEMPERATURE: 97.1 F | RESPIRATION RATE: 18 BRPM

## 2024-11-15 VITALS
TEMPERATURE: 97.1 F | DIASTOLIC BLOOD PRESSURE: 84 MMHG | SYSTOLIC BLOOD PRESSURE: 156 MMHG | HEART RATE: 60 BPM | RESPIRATION RATE: 18 BRPM

## 2024-11-15 DIAGNOSIS — E11.621 DIABETIC ULCER OF LEFT FOOT ASSOCIATED WITH TYPE 2 DIABETES MELLITUS, WITH MUSCLE INVOLVEMENT WITHOUT EVIDENCE OF NECROSIS, UNSPECIFIED PART OF FOOT (HCC): Primary | ICD-10-CM

## 2024-11-15 DIAGNOSIS — L97.525 DIABETIC ULCER OF LEFT FOOT ASSOCIATED WITH TYPE 2 DIABETES MELLITUS, WITH MUSCLE INVOLVEMENT WITHOUT EVIDENCE OF NECROSIS, UNSPECIFIED PART OF FOOT (HCC): Primary | ICD-10-CM

## 2024-11-15 DIAGNOSIS — L97.525 DIABETIC ULCER OF LEFT FOOT ASSOCIATED WITH TYPE 2 DIABETES MELLITUS, WITH MUSCLE INVOLVEMENT WITHOUT EVIDENCE OF NECROSIS, UNSPECIFIED PART OF FOOT (HCC): ICD-10-CM

## 2024-11-15 DIAGNOSIS — E11.621 DIABETIC ULCER OF LEFT FOOT ASSOCIATED WITH TYPE 2 DIABETES MELLITUS, WITH FAT LAYER EXPOSED, UNSPECIFIED PART OF FOOT (HCC): ICD-10-CM

## 2024-11-15 DIAGNOSIS — E11.621 DIABETIC ULCER OF TOE OF RIGHT FOOT ASSOCIATED WITH TYPE 2 DIABETES MELLITUS, LIMITED TO BREAKDOWN OF SKIN (HCC): ICD-10-CM

## 2024-11-15 DIAGNOSIS — L97.511 DIABETIC ULCER OF TOE OF RIGHT FOOT ASSOCIATED WITH TYPE 2 DIABETES MELLITUS, LIMITED TO BREAKDOWN OF SKIN (HCC): ICD-10-CM

## 2024-11-15 DIAGNOSIS — E11.621 DIABETIC ULCER OF LEFT FOOT ASSOCIATED WITH TYPE 2 DIABETES MELLITUS, WITH MUSCLE INVOLVEMENT WITHOUT EVIDENCE OF NECROSIS, UNSPECIFIED PART OF FOOT (HCC): ICD-10-CM

## 2024-11-15 DIAGNOSIS — L97.522 DIABETIC ULCER OF LEFT FOOT ASSOCIATED WITH TYPE 2 DIABETES MELLITUS, WITH FAT LAYER EXPOSED, UNSPECIFIED PART OF FOOT (HCC): ICD-10-CM

## 2024-11-15 LAB
GLUCOSE SERPL-MCNC: 148 MG/DL (ref 65–140)
GLUCOSE SERPL-MCNC: 199 MG/DL (ref 65–140)

## 2024-11-15 PROCEDURE — 97597 DBRDMT OPN WND 1ST 20 CM/<: CPT | Performed by: FAMILY MEDICINE

## 2024-11-15 PROCEDURE — 11042 DBRDMT SUBQ TIS 1ST 20SQCM/<: CPT | Performed by: FAMILY MEDICINE

## 2024-11-15 PROCEDURE — 99183 HYPERBARIC OXYGEN THERAPY: CPT | Performed by: FAMILY MEDICINE

## 2024-11-15 PROCEDURE — 82948 REAGENT STRIP/BLOOD GLUCOSE: CPT | Performed by: FAMILY MEDICINE

## 2024-11-15 PROCEDURE — NC001 PR NO CHARGE: Performed by: FAMILY MEDICINE

## 2024-11-15 PROCEDURE — G0277 HBOT, FULL BODY CHAMBER, 30M: HCPCS | Performed by: FAMILY MEDICINE

## 2024-11-15 RX ORDER — LIDOCAINE 40 MG/G
CREAM TOPICAL ONCE
Status: COMPLETED | OUTPATIENT
Start: 2024-11-15 | End: 2024-11-15

## 2024-11-15 RX ADMIN — LIDOCAINE 1 APPLICATION: 40 CREAM TOPICAL at 11:45

## 2024-11-15 NOTE — PROGRESS NOTES
Wound Procedure Treatment    Performed by: Nadege Pinzon RN  Authorized by: Nitza Sotelo DO    Associated wounds:   Wound 08/15/24 Diabetic Ulcer Heel Left  Wound 11/08/24 Diabetic Ulcer Toe D1, great Right;Posterior  Wound cleansed with:  NSS  Applied to periwound:  Moisture lotion  Applied primary dressing:  Calcium alginate and Silver  Applied secondary dressing:  ABD and Gauze  Dressing secured with:  Tape and Ender  Wound Procedure Treatment Diabetic Ulcer Anterior;Left Toe D2, second    Performed by: Nadege Pinzon RN  Authorized by: Nitza Sotelo DO    Associated wounds:   Wound 08/15/24 Diabetic Ulcer Toe D2, second Anterior;Left  Wound cleansed with:  NSS  Applied primary dressing:  Dermagran  Applied secondary dressing:  Gauze  Dressing secured with:  Ender and Tape

## 2024-11-15 NOTE — PROGRESS NOTES
Name: Thomas Horne      : 1945      MRN: [id number]  Encounter Provider: Nitza Sotelo DO  Encounter Date: 11/15/2024   Encounter department: [facility] WOUND CARE  :  Assessment & Plan  Diabetic ulcer of left foot associated with type 2 diabetes mellitus, with muscle involvement without evidence of necrosis, unspecified part of foot (HCC)  Heel wound is slightly improved and toe wound is significantly improved  See debridement notes  Continue wound management with Dermagran to the left toe and silver alginate to the heel, see wound orders below  Tight diabetic control and proper offloading extensively discussed   continue HBO treatments  Trial of Aurix PRP on Tuesday    Lab Results   Component Value Date    HGBA1C 6.2 (H) 10/14/2024       Orders:    lidocaine (LMX) 4 % cream    Wound cleansing and dressings; Future    Wound Procedure Treatment    Wound Procedure Treatment Diabetic Ulcer Anterior;Left Toe D2, second    Debridement Diabetic Ulcer Left Heel    Debridement Diabetic Ulcer Anterior;Left Toe D2, second    Diabetic ulcer of toe of right foot associated with type 2 diabetes mellitus, limited to breakdown of skin (HCC)  Wound is worse  See debridement note  Wound management with silver alginate, see wound orders below  Tight diabetic control and proper offloading discussed    Lab Results   Component Value Date    HGBA1C 6.2 (H) 10/14/2024       Orders:    lidocaine (LMX) 4 % cream    Wound cleansing and dressings; Future    Wound Procedure Treatment    Wound Procedure Treatment Diabetic Ulcer Anterior;Left Toe D2, second    Debridement Diabetic Ulcer Right;Posterior Toe D1, great    Diabetic ulcer of left foot associated with type 2 diabetes mellitus, with fat layer exposed, unspecified part of foot (HCC)    Lab Results   Component Value Date    HGBA1C 6.2 (H) 10/14/2024       Orders:    lidocaine (LMX) 4 % cream    Wound cleansing and dressings; Future    Wound Procedure  Treatment    Wound Procedure Treatment Diabetic Ulcer Anterior;Left Toe D2, second        History of Present Illness   Chief Complaint   Patient presents with    Follow Up Wound Care Visit     Left foot wounds   Patient presents for follow-up of multiple DFU's of bilateral feet.  No increased pain or drainage.  Patient has been using silver alginate on the left heel and right great toe wounds and Dermagran on the left toe wound.  Has had a foam over the left lateral foot wound.  Using a heel relief shoe for offloading for the left foot.  He is wearing a sandal on the right foot which allows the toe to be free.      Here for wound follow up.    Objective   /78   Pulse 61   Temp (!) 97.1 °F (36.2 °C)   Resp 18     Physical Exam  Wound 08/15/24 Diabetic Ulcer Heel Left (Active)   Enter Sumner score: Sumner Grade 3: Deep abscess, OM, or joint sepsis 11/15/24 1110   Wound Image   11/15/24 1110   Wound Description Pink;Yellow;White;Slough 11/15/24 1110   Clara-wound Assessment Dry;Scaly;Callus 11/15/24 1110   Wound Length (cm) 1.8 cm 11/15/24 1110   Wound Width (cm) 2.9 cm 11/15/24 1110   Wound Depth (cm) 0.3 cm 11/15/24 1110   Wound Surface Area (cm^2) 5.22 cm^2 11/15/24 1110   Wound Volume (cm^3) 1.566 cm^3 11/15/24 1110   Calculated Wound Volume (cm^3) 1.57 cm^3 11/15/24 1110   Change in Wound Size % -772.22 11/15/24 1110   Number of underminings 1 11/15/24 1110   Undermining 1 0.5 11/15/24 1110   Undermining 1 is depth extending from 1-3 o'clock 11/15/24 1110   Drainage Amount Moderate 11/15/24 1110   Drainage Description Serosanguineous;Yellow 11/15/24 1110   Non-staged Wound Description Full thickness 11/15/24 1110   Dressing Status Intact 11/15/24 1110       Wound 08/15/24 Diabetic Ulcer Plantar Left;Lateral (Active)   Enter Sumner score: Sumner Grade 2: Deep ulcer extended to ligament, tendon, joint capsule, bone, or deep fascia without abscess or osteomyelitis (OM) 11/15/24 1110   Wound Image   11/15/24  1110   Wound Description Epithelialization 11/15/24 1110   Clara-wound Assessment Dry 11/15/24 1110   Wound Length (cm) 0 cm 11/15/24 1110   Wound Width (cm) 0 cm 11/15/24 1110   Wound Depth (cm) 0 cm 11/15/24 1110   Wound Surface Area (cm^2) 0 cm^2 11/15/24 1110   Wound Volume (cm^3) 0 cm^3 11/15/24 1110   Calculated Wound Volume (cm^3) 0 cm^3 11/15/24 1110   Change in Wound Size % 100 11/15/24 1110   Drainage Amount None 11/15/24 1110   Drainage Description Serosanguineous 11/08/24 1047   Non-staged Wound Description Not applicable 11/15/24 1110   Dressing Status Intact 11/15/24 1110       Wound 08/15/24 Diabetic Ulcer Toe D2, second Anterior;Left (Active)   Enter Sumner score: Sumner Grade 3: Deep abscess, OM, or joint sepsis 11/15/24 1109   Wound Image   11/15/24 1109   Wound Description Epithelialization;Brown 11/15/24 1109   Clara-wound Assessment Dry 11/15/24 1109   Wound Length (cm) 0.1 cm 11/15/24 1109   Wound Width (cm) 0.1 cm 11/15/24 1109   Wound Depth (cm) 0.1 cm 11/15/24 1109   Wound Surface Area (cm^2) 0.01 cm^2 11/15/24 1109   Wound Volume (cm^3) 0.001 cm^3 11/15/24 1109   Calculated Wound Volume (cm^3) 0 cm^3 11/15/24 1109   Change in Wound Size % 100 11/15/24 1109   Number of underminings 1 09/30/24 0906   Undermining 1 0 10/15/24 0829   Undermining 1 is depth extending from 12-12 10/07/24 1112   Drainage Amount Scant 11/15/24 1109   Drainage Description Serosanguineous 11/15/24 1109   Non-staged Wound Description Full thickness 11/15/24 1109   Dressing Status Intact 11/15/24 1109       Wound 11/08/24 Diabetic Ulcer Toe D1, great Right;Posterior (Active)   Enter Sumner score: Sumner Grade 1: Partial or full-thickness ulcer (superficial) 11/15/24 1111   Wound Image   11/15/24 1111   Wound Description Epithelialization;Yellow;Pink 11/15/24 1111   Clara-wound Assessment Dry 11/15/24 1111   Wound Length (cm) 1.3 cm 11/15/24 1111   Wound Width (cm) 1.1 cm 11/15/24 1111   Wound Depth (cm) 0.1 cm 11/15/24  "1111   Wound Surface Area (cm^2) 1.43 cm^2 11/15/24 1111   Wound Volume (cm^3) 0.143 cm^3 11/15/24 1111   Calculated Wound Volume (cm^3) 0.14 cm^3 11/15/24 1111   Change in Wound Size % -133.33 11/15/24 1111   Drainage Amount Small 11/15/24 1111   Drainage Description Sanguineous;Serosanguineous 11/15/24 1111   Non-staged Wound Description Full thickness 11/15/24 1111   Dressing Status Intact 11/15/24 1111       Debridement   Wound 08/15/24 Diabetic Ulcer Heel Left    Universal Protocol:  procedure performed by consultantConsent: Verbal consent obtained.  Consent given by: patient  Time out: Immediately prior to procedure a \"time out\" was called to verify the correct patient, procedure, equipment, support staff and site/side marked as required.  Timeout called at: 11/15/2024 11:20 AM.  Patient understanding: patient states understanding of the procedure being performed  Patient identity confirmed: verbally with patient    Debridement Details  Performed by: physician  Debridement type: surgical  Level of debridement: subcutaneous tissue  Pain control: lidocaine 4%      Post-debridement measurements  Length (cm): 1.8  Width (cm): 2.9  Depth (cm): 0.4  Percent debrided: 100%  Surface Area (cm^2): 5.22  Area Debrided (cm^2): 5.22  Volume (cm^3): 2.09    Tissue and other material debrided: subcutaneous tissue  Devitalized tissue debrided: exudate and slough  Instrument(s) utilized: curette  Bleeding: small  Hemostasis obtained with: pressure  Response to treatment: procedure was tolerated well    Debridement   Wound 11/08/24 Diabetic Ulcer Toe D1, great Right;Posterior    Universal Protocol:  procedure performed by consultantConsent: Verbal consent obtained.  Consent given by: patient  Time out: Immediately prior to procedure a \"time out\" was called to verify the correct patient, procedure, equipment, support staff and site/side marked as required.  Timeout called at: 11/15/2024 11:20 AM.  Patient understanding: patient " states understanding of the procedure being performed  Patient identity confirmed: verbally with patient    Debridement Details  Performed by: physician  Debridement type: surgical  Level of debridement: subcutaneous tissue  Pain control: lidocaine 4%      Post-debridement measurements  Length (cm): 1.3  Width (cm): 1.1  Depth (cm): 0.2  Percent debrided: 100%  Surface Area (cm^2): 1.43  Area Debrided (cm^2): 1.43  Volume (cm^3): 0.29    Tissue and other material debrided: subcutaneous tissue  Devitalized tissue debrided: exudate and slough  Instrument(s) utilized: curette  Bleeding: small  Hemostasis obtained with: pressure  Response to treatment: procedure was tolerated well       Results from last 6 Months   Lab Units 08/08/24  1344   WOUND CULTURE  4+ Growth of Klebsiella pneumoniae*  3+ Growth of Citrobacter koseri*  2+ Growth of Pseudomonas aeruginosa*  2+ Growth of       Administrative Statements   I have spent a total time of 25 minutes in caring for this patient on the day of the visit/encounter including Risks and benefits of tx options, Instructions for management, Patient and family education, Importance of tx compliance, Risk factor reductions, Impressions, and Documenting in the medical record.

## 2024-11-15 NOTE — PROGRESS NOTES
HBO Treatment Course Details       Treatment Notes: Treatment tolerated well.  No complaints of pain or discomfort.  Wound care visit being done after dive today     Treatment Course Number: 14  Total Treatments Ordered:  40     Diagnosis:   1. Diabetic ulcer of left foot associated with type 2 diabetes mellitus, with muscle involvement without evidence of necrosis, unspecified part of foot (HCC)  Hyperbaric oxygen thearpy          HBO Treatment Details:  In-Patient Visit: no  Treatment Length:90 Minutes(Minutes)  Chamber #: Hard sided Monoplace Chamber    Pre-Treatment details:  Pre-treatment protocol Treatment Protocol: 2.5 INDY X 90 minutes w/ 100% oxygen, two 5 minute air breaks                                             Treatment details:  INDY Rate: 2.5  Started Compression: 0845  Reached Compression: 0855  Total Compression Time: 10 (Minutes)  Total Holding Time: 100 (Minutes)  Started Decompression: 1035  Reached Surface: 1045  Total Decompression Time: 10 (Minutes)  Total Airbreaks:   (Minutes)  Total Time of Treatment: 120 (Minutes)  Symptoms Noted During Treatment: None (Minutes)    Post treatment details:                                                    Vital Signs:  HBO Glucose Reference Range: 100-350 mg/dl   Pre-Treatment Post-Treatment   Time vitals are taken: 0755 Time vitals are taken: 1045   Blood Pressure: 156/84 Blood Pressure: 164/75   Pulse: 60 Pulse: 61   Resp: 18 Resp: 18   Temp: 97.1 °F (36.2 °C) Temp: 97.1 °F (36.2 °C)   Pre-Inspection Glucose readin Post-Inspection Glucose readin       Allergies   Allergen Reactions    Lisinopril Rash and Lip Swelling     Patient Active Problem List    Diagnosis Date Noted    Foot ulcer (HCC) 2024    SOB (shortness of breath) 2024    Nausea 2024    Elevated troponin 2024    History of DVT (deep vein thrombosis) 2024    Diabetic ulcer of toe of left foot associated with type 2 diabetes mellitus, limited to  breakdown of skin ([facility]) 08/08/2024    Plantar fasciitis, right 07/10/2024    Acute deep vein thrombosis (DVT) of left peroneal vein ([facility]) 05/01/2024    Iron deficiency anemia 04/23/2024    Shortness of breath 04/09/2024    History of colon polyps 02/22/2024    Duodenal ulcer 02/01/2024    Multiple gastric ulcers 12/27/2023    Iron deficiency anemia due to chronic blood loss 12/27/2023    Melena 12/26/2023    Symptomatic anemia 12/26/2023    Frequent PVCs 06/17/2023    Acute on chronic diastolic heart failure ([facility]) 06/16/2023    Stage 3b chronic kidney disease ([facility]) 06/16/2023    Diabetic ulcer of right midfoot associated with diabetes mellitus due to underlying condition, limited to breakdown of skin ([facility]) 06/15/2023    Hypertensive urgency 05/11/2023    Elevated troponin level not due myocardial infarction 05/11/2023    Stable angina pectoris ([facility]) 03/07/2023    Chronic kidney disease-mineral and bone disorder 11/30/2022    Mild aortic stenosis 11/11/2022    Chronic HFpEF/Moderate pHTN (HFpEF) ([facility]) 11/10/2022    Gross hematuria 07/26/2022    Platelets decreased ([facility]) 07/22/2022    Acute kidney injury superimposed on chronic kidney disease  ([facility]) 02/16/2022    Actinic keratoses 01/14/2022    Type 2 diabetes mellitus with diabetic peripheral angiopathy without gangrene, with long-term current use of insulin ([facility]) 01/11/2022    Varicose veins of left lower extremity 06/25/2021    History of endovascular stent graft for abdominal aortic aneurysm (AAA) 06/25/2021    Bruit (arterial) 06/25/2021    Peripheral artery disease (HCC) 06/25/2021    Type 2 diabetes mellitus with diabetic polyneuropathy, with long-term current use of insulin ([facility]) 06/10/2021    Mixed hyperlipidemia 05/11/2021    Primary hypertension 05/11/2021    Coronary artery disease involving native coronary artery of native heart without angina pectoris 05/11/2021    S/P angioplasty with stent 05/11/2021    Hx of CABG 05/11/2021    S/P AAA repair  05/11/2021    S/P prostatectomy 05/11/2021    H/O prostate cancer 05/11/2021     No orders of the defined types were placed in this encounter.

## 2024-11-15 NOTE — PROGRESS NOTES
"Debridement   Wound 08/15/24 Diabetic Ulcer Toe D2, second Anterior;Left    Universal Protocol:  procedure performed by consultantConsent: Verbal consent obtained.  Consent given by: patient  Time out: Immediately prior to procedure a \"time out\" was called to verify the correct patient, procedure, equipment, support staff and site/side marked as required.  Timeout called at: 11/15/2024 11:20 AM.  Patient understanding: patient states understanding of the procedure being performed  Patient identity confirmed: verbally with patient    Debridement Details  Performed by: physician  Debridement type: selective  Pain control: lidocaine 4%      Post-debridement measurements  Length (cm): 0.1  Width (cm): 0.1  Depth (cm): 0.1  Percent debrided: 100%  Surface Area (cm^2): 0.01  Area Debrided (cm^2): 0.01  Volume (cm^3): 0    Devitalized tissue debrided: biofilm and callus  Instrument(s) utilized: curette  Bleeding: small  Hemostasis obtained with: pressure  Response to treatment: procedure was tolerated well        "

## 2024-11-15 NOTE — PATIENT INSTRUCTIONS
Orders Placed This Encounter   Procedures    Wound cleansing and dressings     Left lateral foot wound is healed today. May leave open to air.    RIGHT GREAT TOE WOUND:     Wash your hands with soap and water.  Remove old dressing, discard into plastic bag and place in trash.  Cleanse the wound with saline prior to applying a clean dressing. Do not use tissue or cotton balls. Do not scrub the wound. Pat dry using gauze.  Shower no, unless able to keep wounds and dressings dry   Apply lotion to skin surrounding wound  Apply Silver alginate to the open wound.    Cover with gauze  Secure with rolled gauze and tape  Change dressing 3 times weekly after HBO     Off-loading Instructions:     Keep weight and pressure off wound at all times. and Wear off-loading OOFOS slides are ok as directed by your physician. Put on immediately when rising in the morning and remove when going to bed.        LEFT HEEL WOUND:        Wash your hands with soap and water. Remove old dressing, discard into plastic bag and place in trash. Cleanse the wound with saline moistened gauze then saline prior to applying a clean dressing. Do not use tissue or cotton balls. Do not scrub the wound. Pat dry using gauze.      Shower no, unless able to keep wounds dry.      Apply lotion to skin surrounding wound   Apply Silver alginate to the open wound. (Tuck into undermining at 1-3 o'clock)  Cover with abd   Secure with rolled gauze and tape      Change dressing 3 times weekly after HBO     LEFT TOE      Wash your hands with soap and water.  Remove old dressing, discard into plastic bag and place in trash.  Cleanse the wound with saline moistened gauze prior to applying a clean dressing. Do not use tissue or cotton balls. Do not scrub the wound. Pat dry using gauze.  Shower no, unless able to keep wounds dry     Apply dermagran  Cover with gauze  Secure with tape  Change dressing 3 times weekly after HBO       Off-loading Instructions: Wear off-loading  device as directed by your physician (Globo Ped). Put on immediately when rising in the morning and remove when going to bed and Turn every 2 hours. Avoid position directing pressure to Wound site. Limit side lying to 30 degree tilt. Limit the head of bed elevation to 30 degrees. Limit walking and standing to only necessity for activities of daily living.      Wound infection: If you have signs of infection please call the wound center. If the wound center is closedplease go to the Emergency department. Some signs of infection: fever, chills, increased redness, red streaks, increase in pain, increased drainage. Drainage with an odor, Change in drainage color: white/milky/green/tan/yellow, an increase in swelling, chest pain and/or shortness of breath.     Protein: Eat protein with each meal to promote healing. Examples of protein are fish, meat, chicken, nuts, peanut butter, eggs, lentils, edamame or a protein shake.     Standing Status:   Future     Expiration Date:   11/22/2024    Wound Procedure Treatment     This order was created via procedure documentation    Wound Procedure Treatment Diabetic Ulcer Anterior;Left Toe D2, second     This order was created via procedure documentation

## 2024-11-17 DIAGNOSIS — I12.9 BENIGN HYPERTENSION WITH CKD (CHRONIC KIDNEY DISEASE) STAGE III (HCC): ICD-10-CM

## 2024-11-17 DIAGNOSIS — N18.30 BENIGN HYPERTENSION WITH CKD (CHRONIC KIDNEY DISEASE) STAGE III (HCC): ICD-10-CM

## 2024-11-18 ENCOUNTER — OFFICE VISIT (OUTPATIENT)
Dept: WOUND CARE | Facility: HOSPITAL | Age: 79
End: 2024-11-18
Payer: COMMERCIAL

## 2024-11-18 VITALS
DIASTOLIC BLOOD PRESSURE: 77 MMHG | RESPIRATION RATE: 18 BRPM | SYSTOLIC BLOOD PRESSURE: 154 MMHG | HEART RATE: 60 BPM | TEMPERATURE: 97.3 F

## 2024-11-18 DIAGNOSIS — E11.621 DIABETIC ULCER OF LEFT FOOT ASSOCIATED WITH TYPE 2 DIABETES MELLITUS, WITH MUSCLE INVOLVEMENT WITHOUT EVIDENCE OF NECROSIS, UNSPECIFIED PART OF FOOT (HCC): ICD-10-CM

## 2024-11-18 DIAGNOSIS — L97.525 DIABETIC ULCER OF LEFT FOOT ASSOCIATED WITH TYPE 2 DIABETES MELLITUS, WITH MUSCLE INVOLVEMENT WITHOUT EVIDENCE OF NECROSIS, UNSPECIFIED PART OF FOOT (HCC): ICD-10-CM

## 2024-11-18 LAB
GLUCOSE SERPL-MCNC: 172 MG/DL (ref 65–140)
GLUCOSE SERPL-MCNC: 211 MG/DL (ref 65–140)

## 2024-11-18 PROCEDURE — 99183 HYPERBARIC OXYGEN THERAPY: CPT | Performed by: PHYSICIAN ASSISTANT

## 2024-11-18 PROCEDURE — 82948 REAGENT STRIP/BLOOD GLUCOSE: CPT

## 2024-11-18 PROCEDURE — G0277 HBOT, FULL BODY CHAMBER, 30M: HCPCS

## 2024-11-18 NOTE — PROGRESS NOTES
HBO Treatment Course Details       Treatment Notes: Treatment tolerated well.  No complaints of pain or discomfort     Treatment Course Number: 15  Total Treatments Ordered:  40     Diagnosis:   1. Diabetic ulcer of left foot associated with type 2 diabetes mellitus, with muscle involvement without evidence of necrosis, unspecified part of foot (HCC)  [facility]          HBO Treatment Details:  In-Patient Visit: no  Treatment Length:90 Minutes(Minutes)  Chamber #: Hard sided Monoplace Chamber    Pre-Treatment details:  Pre-treatment protocol Treatment Protocol: 2.5 INDY X 90 minutes w/ 100% oxygen, two 5 minute air breaks  Left ear clear?: yes  Right ear clear?: yes  Left ear intact?: yes  Right ear intact?: yes        PE Tubes present, Left ear?: yes  PE Tubes present, Right ear?: yes  Left ear irrigated?: no  Right ear irrigated?: no  Left ear TEED scale: Grade 0  Right ear TEED scale: Grade 0   Pretreatment heart and lung assessment: Pretreatment heart and lung auscltation unremarkable. Patient cleared for HBOT     Treatment details:  INDY Rate: 2.5  Started Compression: 0817  Reached Compression: 0827  Total Compression Time: 10 (Minutes)  Total Holding Time: 100 (Minutes)  Started Decompression: 1007  Reached Surface: 1017  Total Decompression Time: 10 (Minutes)  Total Airbreaks:   (Minutes)  Total Time of Treatment: 120 (Minutes)  Symptoms Noted During Treatment: None (Minutes)    Post treatment details:  Left ear clear?:  (fluid in canal, in front of TM)  Right ear clear?:  (fluid in canal, in front of TM)  Left ears intact?: yes  Right ears intact?: yes        PE Tubes present, Left ear?: yes  PE Tubes present, Right ear?: yes        Left ear TEED scale: Grade 0  Right ear TEED scale: Grade 0  Post treatment heart and lung assessment: Post treatment heart and lung auscltation unremarkable. Patient cleared for discharge. Tolerated treatment well.             Vital Signs:  HBO Glucose Reference  Range: 100-350 mg/dl   Pre-Treatment Post-Treatment   Time vitals are taken: 0800 Time vitals are taken: 1030   Blood Pressure: 154/77 Blood Pressure: 139/77   Pulse: 60 Pulse: 58   Resp: 18 Resp: 18   Temp: 97.3 °F (36.3 °C) Temp: 97.1 °F (36.2 °C)   Pre-Inspection Glucose readin Post-Inspection Glucose readin       Allergies   Allergen Reactions    Lisinopril Rash and Lip Swelling     Patient Active Problem List    Diagnosis Date Noted    Foot ulcer (HCC) 2024    SOB (shortness of breath) 2024    Nausea 2024    Elevated troponin 2024    History of DVT (deep vein thrombosis) 2024    Diabetic ulcer of toe of left foot associated with type 2 diabetes mellitus, limited to breakdown of skin ([facility]) 2024    Plantar fasciitis, right 07/10/2024    Acute deep vein thrombosis (DVT) of left peroneal vein ([facility]) 2024    Iron deficiency anemia 2024    Shortness of breath 2024    History of colon polyps 2024    Duodenal ulcer 2024    Multiple gastric ulcers 2023    Iron deficiency anemia due to chronic blood loss 2023    Melena 2023    Symptomatic anemia 2023    Frequent PVCs 2023    Acute on chronic diastolic heart failure (HCC) 2023    Stage 3b chronic kidney disease ([facility]) 2023    Diabetic ulcer of right midfoot associated with diabetes mellitus due to underlying condition, limited to breakdown of skin ([facility]) 06/15/2023    Hypertensive urgency 2023    Elevated troponin level not due myocardial infarction 2023    Stable angina pectoris ([facility]) 2023    Chronic kidney disease-mineral and bone disorder 2022    Mild aortic stenosis 2022    Chronic HFpEF/Moderate pHTN (HFpEF) ([facility]) 11/10/2022    Gross hematuria 2022    Platelets decreased ([facility]) 2022    Acute kidney injury superimposed on chronic kidney disease  ([facility]) 2022    Actinic keratoses 2022    Type 2 diabetes  mellitus with diabetic peripheral angiopathy without gangrene, with long-term current use of insulin ([facility]) 01/11/2022    Varicose veins of left lower extremity 06/25/2021    History of endovascular stent graft for abdominal aortic aneurysm (AAA) 06/25/2021    Bruit (arterial) 06/25/2021    Peripheral artery disease (HCC) 06/25/2021    Type 2 diabetes mellitus with diabetic polyneuropathy, with long-term current use of insulin ([facility]) 06/10/2021    Mixed hyperlipidemia 05/11/2021    Primary hypertension 05/11/2021    Coronary artery disease involving native coronary artery of native heart without angina pectoris 05/11/2021    S/P angioplasty with stent 05/11/2021    Hx of CABG 05/11/2021    S/P AAA repair 05/11/2021    S/P prostatectomy 05/11/2021    H/O prostate cancer 05/11/2021     No orders of the defined types were placed in this encounter.

## 2024-11-19 ENCOUNTER — OFFICE VISIT (OUTPATIENT)
Dept: WOUND CARE | Facility: HOSPITAL | Age: 79
End: 2024-11-19
Payer: COMMERCIAL

## 2024-11-19 VITALS
HEART RATE: 70 BPM | RESPIRATION RATE: 18 BRPM | SYSTOLIC BLOOD PRESSURE: 150 MMHG | TEMPERATURE: 97.2 F | DIASTOLIC BLOOD PRESSURE: 72 MMHG

## 2024-11-19 VITALS
HEART RATE: 56 BPM | SYSTOLIC BLOOD PRESSURE: 123 MMHG | TEMPERATURE: 97.2 F | DIASTOLIC BLOOD PRESSURE: 54 MMHG | RESPIRATION RATE: 15 BRPM

## 2024-11-19 DIAGNOSIS — I73.9 PERIPHERAL ARTERY DISEASE (HCC): ICD-10-CM

## 2024-11-19 DIAGNOSIS — E11.621 DIABETIC ULCER OF LEFT FOOT ASSOCIATED WITH TYPE 2 DIABETES MELLITUS, WITH MUSCLE INVOLVEMENT WITHOUT EVIDENCE OF NECROSIS, UNSPECIFIED PART OF FOOT (HCC): Primary | ICD-10-CM

## 2024-11-19 DIAGNOSIS — E11.621 DIABETIC ULCER OF TOE OF RIGHT FOOT ASSOCIATED WITH TYPE 2 DIABETES MELLITUS, LIMITED TO BREAKDOWN OF SKIN (HCC): ICD-10-CM

## 2024-11-19 DIAGNOSIS — Z79.4 TYPE 2 DIABETES MELLITUS WITH DIABETIC PERIPHERAL ANGIOPATHY WITHOUT GANGRENE, WITH LONG-TERM CURRENT USE OF INSULIN (HCC): ICD-10-CM

## 2024-11-19 DIAGNOSIS — E11.51 TYPE 2 DIABETES MELLITUS WITH DIABETIC PERIPHERAL ANGIOPATHY WITHOUT GANGRENE, WITH LONG-TERM CURRENT USE OF INSULIN (HCC): ICD-10-CM

## 2024-11-19 DIAGNOSIS — L97.511 DIABETIC ULCER OF TOE OF RIGHT FOOT ASSOCIATED WITH TYPE 2 DIABETES MELLITUS, LIMITED TO BREAKDOWN OF SKIN (HCC): ICD-10-CM

## 2024-11-19 DIAGNOSIS — L97.525 DIABETIC ULCER OF LEFT FOOT ASSOCIATED WITH TYPE 2 DIABETES MELLITUS, WITH MUSCLE INVOLVEMENT WITHOUT EVIDENCE OF NECROSIS, UNSPECIFIED PART OF FOOT (HCC): ICD-10-CM

## 2024-11-19 DIAGNOSIS — E11.621 DIABETIC ULCER OF LEFT FOOT ASSOCIATED WITH TYPE 2 DIABETES MELLITUS, WITH MUSCLE INVOLVEMENT WITHOUT EVIDENCE OF NECROSIS, UNSPECIFIED PART OF FOOT (HCC): ICD-10-CM

## 2024-11-19 DIAGNOSIS — L97.525 DIABETIC ULCER OF LEFT FOOT ASSOCIATED WITH TYPE 2 DIABETES MELLITUS, WITH MUSCLE INVOLVEMENT WITHOUT EVIDENCE OF NECROSIS, UNSPECIFIED PART OF FOOT (HCC): Primary | ICD-10-CM

## 2024-11-19 LAB
GLUCOSE SERPL-MCNC: 193 MG/DL (ref 65–140)
GLUCOSE SERPL-MCNC: 202 MG/DL (ref 65–140)

## 2024-11-19 PROCEDURE — G0277 HBOT, FULL BODY CHAMBER, 30M: HCPCS | Performed by: FAMILY MEDICINE

## 2024-11-19 PROCEDURE — 82948 REAGENT STRIP/BLOOD GLUCOSE: CPT | Performed by: FAMILY MEDICINE

## 2024-11-19 PROCEDURE — 99183 HYPERBARIC OXYGEN THERAPY: CPT | Performed by: FAMILY MEDICINE

## 2024-11-19 PROCEDURE — 11042 DBRDMT SUBQ TIS 1ST 20SQCM/<: CPT | Performed by: FAMILY MEDICINE

## 2024-11-19 RX ORDER — METOPROLOL TARTRATE 50 MG
25 TABLET ORAL EVERY 12 HOURS
Qty: 90 TABLET | Refills: 1 | Status: SHIPPED | OUTPATIENT
Start: 2024-11-19

## 2024-11-19 NOTE — PROGRESS NOTES
Wound Procedure Treatment    Performed by: Graciela Philip RN  Authorized by: Nitza Sotelo DO    Associated wounds:   Wound 08/15/24 Diabetic Ulcer Heel Left  Wound 11/08/24 Diabetic Ulcer Toe D1, great Right;Posterior  Wound cleansed with:  NSS  Applied primary dressing:  Other  Applied secondary dressing:  Gauze  Dressing secured with:  Ender and Tape  Comments:  Aurix applied to wounds and covered with Tegaderm by Dr. Sotelo.  Gauze and rolled gauze and tape applied over Tegaderm

## 2024-11-19 NOTE — ASSESSMENT & PLAN NOTE
Lab Results   Component Value Date    HGBA1C 6.2 (H) 10/14/2024       Orders:    Wound Procedure Treatment

## 2024-11-19 NOTE — PROGRESS NOTES
HBO Treatment Course Details       Treatment Notes: Treatment tolerated well no complaints of pain of discomfort     Treatment Course Number: 16  Total Treatments Ordered:  40     Diagnosis:   1. Diabetic ulcer of left foot associated with type 2 diabetes mellitus, with muscle involvement without evidence of necrosis, unspecified part of foot (HCC)  Hyperbaric oxygen thearpy          HBO Treatment Details:  In-Patient Visit: no  Treatment Length:90 Minutes(Minutes)  Chamber #: Hard sided Monoplace Chamber    Pre-Treatment details:  Pre-treatment protocol Treatment Protocol: 2.5 INDY X 90 minutes w/ 100% oxygen, two 5 minute air breaks                                             Treatment details:  INDY Rate: 2.5  Started Compression: 0824  Reached Compression: 0834  Total Compression Time: 10 (Minutes)  Total Holding Time: 100 (Minutes)  Started Decompression: 1014  Reached Surface: 1024  Total Decompression Time: 10 (Minutes)  Total Airbreaks:   (Minutes)  Total Time of Treatment: 120 (Minutes)  Symptoms Noted During Treatment: None (Minutes)    Post treatment details:  Left ear clear?: yes  Right ear clear?: yes              PE Tubes present, Left ear?: yes  PE Tubes present, Right ear?: yes        Left ear TEED scale: Grade 0  Right ear TEED scale: Grade 0  Post treatment heart and lung assessment: Post treatment heart and lung auscltation unremarkable. Patient cleared for discharge. Tolerated treatment well.             Vital Signs:  HBO Glucose Reference Range: 100-350 mg/dl   Pre-Treatment Post-Treatment   Time vitals are taken: 0810 Time vitals are taken: 1040   Blood Pressure: 150/72 Blood Pressure: 118/67   Pulse: 70 Pulse: 58   Resp: 18 Resp: 18   Temp: 97.2 °F (36.2 °C) Temp: 97.1 °F (36.2 °C)   Pre-Inspection Glucose readin Post-Inspection Glucose readin       Allergies   Allergen Reactions    Lisinopril Rash and Lip Swelling     Patient Active Problem List    Diagnosis Date Noted    Foot ulcer  ([facility]) 09/07/2024    SOB (shortness of breath) 09/06/2024    Nausea 09/06/2024    Elevated troponin 09/06/2024    History of DVT (deep vein thrombosis) 08/26/2024    Diabetic ulcer of toe of left foot associated with type 2 diabetes mellitus, limited to breakdown of skin ([facility]) 08/08/2024    Plantar fasciitis, right 07/10/2024    Acute deep vein thrombosis (DVT) of left peroneal vein ([facility]) 05/01/2024    Iron deficiency anemia 04/23/2024    Shortness of breath 04/09/2024    History of colon polyps 02/22/2024    Duodenal ulcer 02/01/2024    Multiple gastric ulcers 12/27/2023    Iron deficiency anemia due to chronic blood loss 12/27/2023    Melena 12/26/2023    Symptomatic anemia 12/26/2023    Frequent PVCs 06/17/2023    Acute on chronic diastolic heart failure ([facility]) 06/16/2023    Stage 3b chronic kidney disease ([facility]) 06/16/2023    Diabetic ulcer of right midfoot associated with diabetes mellitus due to underlying condition, limited to breakdown of skin ([facility]) 06/15/2023    Hypertensive urgency 05/11/2023    Elevated troponin level not due myocardial infarction 05/11/2023    Stable angina pectoris ([facility]) 03/07/2023    Chronic kidney disease-mineral and bone disorder 11/30/2022    Mild aortic stenosis 11/11/2022    Chronic HFpEF/Moderate pHTN (HFpEF) ([facility]) 11/10/2022    Gross hematuria 07/26/2022    Platelets decreased ([facility]) 07/22/2022    Acute kidney injury superimposed on chronic kidney disease  ([facility]) 02/16/2022    Actinic keratoses 01/14/2022    Type 2 diabetes mellitus with diabetic peripheral angiopathy without gangrene, with long-term current use of insulin ([facility]) 01/11/2022    Varicose veins of left lower extremity 06/25/2021    History of endovascular stent graft for abdominal aortic aneurysm (AAA) 06/25/2021    Bruit (arterial) 06/25/2021    Peripheral artery disease ([facility]) 06/25/2021    Type 2 diabetes mellitus with diabetic polyneuropathy, with long-term current use of insulin ([facility]) 06/10/2021    Mixed  hyperlipidemia 05/11/2021    Primary hypertension 05/11/2021    Coronary artery disease involving native coronary artery of native heart without angina pectoris 05/11/2021    S/P angioplasty with stent 05/11/2021    Hx of CABG 05/11/2021    S/P AAA repair 05/11/2021    S/P prostatectomy 05/11/2021    H/O prostate cancer 05/11/2021     No orders of the defined types were placed in this encounter.

## 2024-11-19 NOTE — PROGRESS NOTES
Name: Thomas Horne      : 1945      MRN: [id number]  Encounter Provider: Nitza Sotelo DO  Encounter Date: 2024   Encounter department: [facility] WOUND [facility]  :  Assessment & Plan  Diabetic ulcer of left foot associated with type 2 diabetes mellitus, with muscle involvement without evidence of necrosis, unspecified part of foot (HCC)  Toe wound is closed  Heel wound is improved   Heel wound debrided as below  Heel wound had Aurix applied today as part of a trial.  Aurix was applied for the initial application today with plan for second application next week  Lab Results   Component Value Date    HGBA1C 6.2 (H) 10/14/2024       Orders:    Wound Procedure Treatment    Debridement Diabetic Ulcer Left Heel    Diabetic ulcer of toe of right foot associated with type 2 diabetes mellitus, limited to breakdown of skin (HCC)  Improved  Aurix applied today as part of a trial.  Aurix was applied for the initial application today with plan for second application next week    Lab Results   Component Value Date    HGBA1C 6.2 (H) 10/14/2024       Orders:    Wound Procedure Treatment    Debridement Diabetic Ulcer Right;Posterior Toe D1, great    Peripheral artery disease (HCC)    Orders:    Wound Procedure Treatment    Type 2 diabetes mellitus with diabetic peripheral angiopathy without gangrene, with long-term current use of insulin (HCC)    Lab Results   Component Value Date    HGBA1C 6.2 (H) 10/14/2024       Orders:    Wound Procedure Treatment        History of Present Illness   Chief Complaint   Patient presents with    Follow Up Wound Care Visit     Right and left foot   Patient presents for followup of multiple DFUs.  Has been undergoing HBO treatments, today was #16. No increased pain or drainage.       Here for wound follow up.    Objective   /54   Pulse 56   Temp (!) 97.2 °F (36.2 °C)   Resp 15     Physical Exam  Wound 08/15/24 Diabetic Ulcer Heel Left (Active)   Enter  Sumner score: Sumner Grade 3: Deep abscess, OM, or joint sepsis 11/15/24 1110   Wound Image   11/19/24 1146   Wound Description Pink;Yellow;White;Slough 11/19/24 1108   Clara-wound Assessment Dry;Scaly;Callus 11/19/24 1108   Wound Length (cm) 1.3 cm 11/19/24 1108   Wound Width (cm) 2.4 cm 11/19/24 1108   Wound Depth (cm) 0.2 cm 11/19/24 1108   Wound Surface Area (cm^2) 3.12 cm^2 11/19/24 1108   Wound Volume (cm^3) 0.624 cm^3 11/19/24 1108   Calculated Wound Volume (cm^3) 0.62 cm^3 11/19/24 1108   Change in Wound Size % -244.44 11/19/24 1108   Number of underminings 1 11/15/24 1110   Undermining 1 0.3 11/19/24 1108   Undermining 1 is depth extending from 1-2 o'clock 11/19/24 1108   Drainage Amount Small 11/19/24 1108   Drainage Description Serosanguineous;Yellow 11/19/24 1108   Non-staged Wound Description Full thickness 11/19/24 1108   Dressing Status Intact 11/19/24 1108       Wound 11/08/24 Diabetic Ulcer Toe D1, great Right;Posterior (Active)   Enter Sumner score: Sumner Grade 1: Partial or full-thickness ulcer (superficial) 11/19/24 1105   Wound Image   11/19/24 1145   Wound Description Dry;Brown;Pink;Black;Other (Comment) 11/19/24 1105   Clara-wound Assessment Dry 11/19/24 1105   Wound Length (cm) 1.5 cm 11/19/24 1105   Wound Width (cm) 0.7 cm 11/19/24 1105   Wound Depth (cm) 0.1 cm 11/19/24 1105   Wound Surface Area (cm^2) 1.05 cm^2 11/19/24 1105   Wound Volume (cm^3) 0.105 cm^3 11/19/24 1105   Calculated Wound Volume (cm^3) 0.11 cm^3 11/19/24 1105   Change in Wound Size % -83.33 11/19/24 1105   Drainage Amount None 11/19/24 1105   Drainage Description Sanguineous;Serosanguineous 11/15/24 1111   Non-staged Wound Description Full thickness 11/19/24 1105   Dressing Status Intact 11/19/24 1105       Debridement   Wound 08/15/24 Diabetic Ulcer Heel Left    Universal Protocol:  procedure performed by consultantConsent: Verbal consent obtained.  Consent given by: patient  Time out: Immediately prior to procedure a  "\"time out\" was called to verify the correct patient, procedure, equipment, support staff and site/side marked as required.  Timeout called at: 11/19/2024 11:20 AM.  Patient understanding: patient states understanding of the procedure being performed  Patient identity confirmed: verbally with patient    Debridement Details  Performed by: physician  Debridement type: surgical  Level of debridement: subcutaneous tissue  Pain control: lidocaine 4%      Post-debridement measurements  Length (cm): 1.3  Width (cm): 2.4  Depth (cm): 0.3  Percent debrided: 100%  Surface Area (cm^2): 3.12  Area Debrided (cm^2): 3.12  Volume (cm^3): 0.94    Tissue and other material debrided: subcutaneous tissue  Devitalized tissue debrided: exudate and slough  Instrument(s) utilized: curette  Bleeding: small  Hemostasis obtained with: pressure  Response to treatment: procedure was tolerated well    Debridement   Wound 11/08/24 Diabetic Ulcer Toe D1, great Right;Posterior    Universal Protocol:  procedure performed by consultantConsent: Verbal consent obtained.  Consent given by: patient  Time out: Immediately prior to procedure a \"time out\" was called to verify the correct patient, procedure, equipment, support staff and site/side marked as required.  Timeout called at: 11/19/2024 11:20 AM.  Patient understanding: patient states understanding of the procedure being performed  Patient identity confirmed: verbally with patient    Debridement Details  Performed by: physician  Debridement type: surgical  Level of debridement: subcutaneous tissue  Pain control: lidocaine 4%      Post-debridement measurements  Length (cm): 1.5  Width (cm): 0.7  Depth (cm): 0.2  Percent debrided: 100%  Surface Area (cm^2): 1.05  Area Debrided (cm^2): 1.05  Volume (cm^3): 0.21    Tissue and other material debrided: subcutaneous tissue  Devitalized tissue debrided: exudate and slough  Instrument(s) utilized: curette  Bleeding: small  Hemostasis obtained with: " pressure  Response to treatment: procedure was tolerated well       Results from last 6 Months   Lab Units 08/08/24  1344   WOUND CULTURE  4+ Growth of Klebsiella pneumoniae*  3+ Growth of Citrobacter koseri*  2+ Growth of Pseudomonas aeruginosa*  2+ Growth of       AURIX APPLICATION TRIAL    The procedure today is the application of Aurix gel to a diabetic foot ulcer  in a diabetic patient. This is the first application of the Aurix gel. The treatment with Aurix gel is reasonable and necessary because this chronic wound has failed to show evidence of a healing response to conservative care. Aurix gel is used in conjunction with standard of care procedures such as debridement of necrotic tissue, moist wound care, advanced dressings and management of underlying medical conditions. Underlying medical conditions and contributing factors for chronic wound including off-loading are adequately managed.   Prior therapy includes nutritional optimization and wound bed management with debridement of necrotic tissue. The Aurix System is cleared by the FDA for the management of chronic wounds and was selected because of its unique ability to utilize the patients own blood cells to produce a matrix composed of the patient's own fibrin and growth factors providing a moist wound environment to supporting angiogenesis and new tissue deposition.  The goal of treatment with Aurix gel for this patient is increase the amount of granulation tissue and deep overall area and volume of the wound.  The patient's medical history was reviewed to rule out anemia and determine Hemoglobin A1C status. There is no evidence of infection, osteomyelitis, cancer or cellulitis. Oxygenation and circulation status was assessed and is adequate to support new tissue growth as determined by FABIOLA . Informed consent was obtained after explaining the risk and benefits of the Aurix procedure to the patient.   Wound Bed Preparation: The wound bed was prepped  in the standard fashion with curette remove any slough.    Aurix Preparation and Application: The blood was drawn from the left arm using the phlebotomy section of the Aurix System according to the 's instructions for use. A total of (4) monovette tubes were drawn. The tubes were placed in the Aurix centrifuge and were spun according to the 's instructions for use. The reagent kit was prepared by reconstituting thrombin powder with 5ml of calcium chloride 10% solution. After centrifugation the PRP was removed from the tubes utilizing sterile technique. The patient was positioned appropriately, and the skin was prepared with a skin prep included in the Aurix kit. The amount of PRP removed was noted and used to calculate the amount of calcified thrombin and ascorbic acid to add to the mixing chamber.  8 mL of PRP was recovered and ascorbic acid (500mg/ml) and calcified thrombin (1,000 units/ml) were sequentially added to the PRP sample at in a ratio of 8:1:1 and gently mixed produce approximately 10 mL of Aurix gel. The Aurix gel was applied directly to the ulcer through blunt tipped cannula.  Fenestrated Tegaderm was applied over the wound. The patient tolerated the procedure well. The patient was educated to recognize signs and symptoms of infection such as edema, swelling, increased pain, and purulent drainage and if present to alert healthcare personnel. The patient was scheduled to return 1 week from today for assessment and possible reapplication.

## 2024-11-19 NOTE — PATIENT INSTRUCTIONS
Orders Placed This Encounter   Procedures    Wound cleansing and dressings     RIGHT GREAT TOE WOUND and LEFT HEEL WOUND:     Aurix product has been applied to your right toe wound and left heel wound. It is covered with Tegaderm and then 4x4 gauze and rolled gauze and secured with tape.  Please do NOT remove the Tegaderm.  The rolled gauze and 4x4 gauze pad may be removed and new applied if there is drainage. The Tegaderm will be removed at your next wound center appointment.      Off-loading Instructions:     Keep weight and pressure off wound at all times. and Wear off-loading OOFOS slides are ok as directed by your physician. Put on immediately when rising in the morning and remove when going to bed.        LEFT TOE wound is healed.            Off-loading Instructions: Wear off-loading device as directed by your physician (Globo Ped). Put on immediately when rising in the morning and remove when going to bed and Turn every 2 hours. Avoid position directing pressure to Wound site. Limit side lying to 30 degree tilt. Limit the head of bed elevation to 30 degrees. Limit walking and standing to only necessity for activities of daily living.      Wound infection: If you have signs of infection please call the wound center. If the wound center is closedplease go to the Emergency department. Some signs of infection: fever, chills, increased redness, red streaks, increase in pain, increased drainage. Drainage with an odor, Change in drainage color: white/milky/green/tan/yellow, an increase in swelling, chest pain and/or shortness of breath.     Protein: Eat protein with each meal to promote healing. Examples of protein are fish, meat, chicken, nuts, peanut butter, eggs, lentils, edamame or a protein shake.     Standing Status:   Future     Expiration Date:   11/26/2024

## 2024-11-20 ENCOUNTER — OFFICE VISIT (OUTPATIENT)
Dept: WOUND CARE | Facility: HOSPITAL | Age: 79
End: 2024-11-20
Payer: COMMERCIAL

## 2024-11-20 ENCOUNTER — OFFICE VISIT (OUTPATIENT)
Dept: CARDIOLOGY CLINIC | Facility: CLINIC | Age: 79
End: 2024-11-20
Payer: COMMERCIAL

## 2024-11-20 VITALS
DIASTOLIC BLOOD PRESSURE: 64 MMHG | HEART RATE: 56 BPM | SYSTOLIC BLOOD PRESSURE: 138 MMHG | WEIGHT: 226 LBS | OXYGEN SATURATION: 96 % | BODY MASS INDEX: 29.02 KG/M2

## 2024-11-20 VITALS
HEART RATE: 61 BPM | DIASTOLIC BLOOD PRESSURE: 64 MMHG | RESPIRATION RATE: 18 BRPM | SYSTOLIC BLOOD PRESSURE: 124 MMHG | TEMPERATURE: 97.2 F

## 2024-11-20 DIAGNOSIS — I49.3 FREQUENT PVCS: ICD-10-CM

## 2024-11-20 DIAGNOSIS — I50.33 ACUTE ON CHRONIC DIASTOLIC HEART FAILURE (HCC): ICD-10-CM

## 2024-11-20 DIAGNOSIS — I10 PRIMARY HYPERTENSION: ICD-10-CM

## 2024-11-20 DIAGNOSIS — N18.30 BENIGN HYPERTENSION WITH CKD (CHRONIC KIDNEY DISEASE) STAGE III (HCC): ICD-10-CM

## 2024-11-20 DIAGNOSIS — R42 LIGHTHEADED: Primary | ICD-10-CM

## 2024-11-20 DIAGNOSIS — I35.0 MILD AORTIC STENOSIS: ICD-10-CM

## 2024-11-20 DIAGNOSIS — I25.10 CORONARY ARTERY DISEASE INVOLVING NATIVE CORONARY ARTERY OF NATIVE HEART WITHOUT ANGINA PECTORIS: ICD-10-CM

## 2024-11-20 DIAGNOSIS — I12.9 BENIGN HYPERTENSION WITH CKD (CHRONIC KIDNEY DISEASE) STAGE III (HCC): ICD-10-CM

## 2024-11-20 DIAGNOSIS — E78.2 MIXED HYPERLIPIDEMIA: Primary | ICD-10-CM

## 2024-11-20 LAB — GLUCOSE SERPL-MCNC: 205 MG/DL (ref 65–140)

## 2024-11-20 PROCEDURE — 93000 ELECTROCARDIOGRAM COMPLETE: CPT | Performed by: PHYSICIAN ASSISTANT

## 2024-11-20 PROCEDURE — 99212 OFFICE O/P EST SF 10 MIN: CPT | Performed by: FAMILY MEDICINE

## 2024-11-20 PROCEDURE — NC001 PR NO CHARGE: Performed by: FAMILY MEDICINE

## 2024-11-20 PROCEDURE — 82948 REAGENT STRIP/BLOOD GLUCOSE: CPT | Performed by: FAMILY MEDICINE

## 2024-11-20 PROCEDURE — 99214 OFFICE O/P EST MOD 30 MIN: CPT | Performed by: PHYSICIAN ASSISTANT

## 2024-11-20 RX ORDER — AMLODIPINE BESYLATE 10 MG/1
5 TABLET ORAL DAILY
Qty: 45 TABLET | Refills: 1 | Status: SHIPPED | OUTPATIENT
Start: 2024-11-20

## 2024-11-20 NOTE — PROGRESS NOTES
Progress Note - Cardiology Office  [facility]    Thomas Horne 79 y.o. male MRN: [id number]  : 1945  Encounter: [id number]      Assessment:     Coronary artery disease.  Chronic stable angina.  Chronic diastolic heart failure.  Moderate to severe aortic valve stenosis.   Essential hypertension.  Dyslipidemia.  PVCs.  Pulmonary hypertension.  CKD stage III.  AAA s/p EVAR.  Peripheral vascular disease.  Iron deficiency anemia.  Type II diabetes.  Obstructive sleep apnea.    Discussion Summary and Plan:    Coronary artery disease.  - s/p CABG x 3 () and s/p PCI with NANDINI to distal Lcx ().   - Patient with history of chronic stable angina.  He denies experiencing chest pain as of late.  - 10/19/22 cardiac catheterization: three-vessel coronary artery disease with 100% occluded right coronary artery, 100% occluded mid LAD, vein graft to RCA is 100% occluded with RCA has some collaterals from left circulation. [place]ima to LAD is widely patent. Most likely graft to circumflex is also 100% occluded as no competitive flow is noted.     Prox LAD to Mid LAD lesion is 100% stenosed. Lima to LAD is widely patent  Prox RCA lesion is 100% stenosed. RCA graft is 100% occluded  Mid Cx to Dist Cx lesion is 40% stenosed.  Ost Cx to Prox Cx lesion is 40% stenosed.  1st Mrg lesion is 99% stenosed. Which appeared to be a bypassed vessel and bypassed seems to be 100% occluded could not visualized.  Origin to Prox Graft lesion is 100% stenosed. RCA graft is 100% occluded  Distal circ stent is widely patent  Patient has grade 1 collaterals from left circulation to distal right coronary artery  Patient has severe three-vessel coronary artery disease with totally occluded 100% RCA, mid LAD and a medium size obtuse marginal. Patent stent seen in distal circumflex. Nonobstructive disease noted of left main and circumflex coronary artery. Vein graft to RCA is 100% occluded. And LIMA to LAD is widely  patent.    - Continue aspirin 81 mg daily.  - Continue Lopressor 25 mg twice daily.  - Continue nitroglycerin as needed.  - Continue Ranexa 1000 mg twice daily.  - Continue Imdur 90 mg daily.    Chronic stable angina.  - Patient denies experiencing chest pain.  - Continue nitroglycerin as needed.  - Continue Ranexa 1000 mg twice daily.  - Continue Imdur 90 mg daily.    Chronic diastolic heart failure.  - Does not appear in acute exacerbation.  Patient is euvolemic on examination.  - 9/07/24 TTE: LVEF 50%. Diastolic function is moderately abnormal, consistent with grade II (pseudonormal) relaxation. Left atrial filling pressure is elevated.  Right ventricular systolic function is mildly reduced. Abnormal tricuspid annular plane systolic excursion (TAPSE) < 1.7 cm.  Left atrium moderately dilated. Aortic Valve: The aortic valve is trileaflet. The leaflets are moderately thickened. The leaflets are moderately calcified. There is moderately reduced mobility. There is mild regurgitation. There is moderate to severe stenosis. The aortic valve peak velocity is 3.84 m/s. The aortic valve peak gradient is 60 mmHg. The aortic valve mean gradient is 32 mmHg. The aortic valve area is 1.17 cm2. Mitral Valve: There is mild regurgitation. Tricuspid Valve: There is mild regurgitation. The right ventricular systolic pressure is normal. The estimated right ventricular systolic pressure is 38.00 mmHg.  - Currently on Lopressor 25 mg twice daily, amlodipine 5 mg daily, clonidine 0.1 mg twice daily, Imdur 90 mg daily, and torsemide 20 mg daily.  - Currently on Jardiance 25 mg daily.  - CHF education.     Moderate to severe aortic valve stenosis.   - 9/07/24 TTE:  Aortic Valve The aortic valve is trileaflet. The leaflets are moderately thickened. The leaflets are moderately calcified. There is moderately reduced mobility. There is mild regurgitation. There is moderate to severe stenosis. The aortic valve peak velocity is 3.84 m/s. The  aortic valve peak gradient is 60 mmHg. The aortic valve mean gradient is 32 mmHg. The aortic valve area is 1.17 cm2.   - 6/17/23 TTE:  Aortic Valve The aortic valve is trileaflet. The leaflets are moderately thickened. The leaflets are moderately calcified. The leaflets exhibit normal mobility. There is no evidence of regurgitation. There is mild to moderate stenosis. The aortic valve peak velocity is 3.2 m/s. The aortic valve peak gradient is 41 mmHg. The aortic valve mean gradient is 25 mmHg.   - 7/26/22 TTE:   Aortic Valve The aortic valve is trileaflet. The leaflets are moderately thickened. The leaflets are moderately calcified. The leaflets exhibit normal mobility. There is no evidence of regurgitation. There is mild stenosis.  Mean/peak gradient is 14/27 mm Hg, JEFF is 1.8 cm2, DVI is 0.52   -       Discussed with patient results of September 2024 TTE which noted progression of his aortic valve stenosis from mild to moderate to now moderate to severe.  Discussed with patient process for TAVR evaluation.  At this time he would like additional time to consider and will let us know if he would like to pursue any further workup.    Essential hypertension.  - BP during today's office visit, 138/64.   - Continue Lopressor 25 mg twice daily, amlodipine 5 mg daily, clonidine 0.1 mg twice daily, Imdur 90 mg daily, and torsemide 20 mg daily.    Dyslipidemia.  - 10/14/24 lipid panel: Cholesterol 175, triglycerides 154, HDL 61, LDL 83.  - Currently on Lipitor 40 mg daily, fenofibrate 160 mg daily, and omega-3 fatty acids.    PVCs.  - Asymptomatic.  Patient denies experiencing palpitations.  - Currently on Lopressor 25 mg twice daily.    Pulmonary hypertension.  - 9/07/24 TTE: The right ventricular systolic pressure is normal. The estimated right ventricular systolic pressure is 38.00 mmHg.   - 6/17/23 TTE: The right ventricular systolic pressure is moderately to severely elevated at 50-60mmHg.     CKD stage III.  -  Baseline creatinine appears between 1.6 and 1.8.  - Follows outpatient with [facility]ph[facility].    AAA s/p EVAR.  - s/p EVAR in 2012.   - Follows outpatient with [facility] vascular surgery.    Peripheral vascular disease.  - s/p L SFA to popliteal artery bypass (8/30/24).   - S/p right CFA to peroneal artery bypass with reversed CryoVein (11/2023).   - Follows outpatient with [facility] vascular surgery.    Iron deficiency anemia.  - Follows outpatient with [facility] Hematology/Oncology.     Type II diabetes.  - 10/14/24 HgbA1c: 6.2.  - Care per PCP.     Obstructive sleep apnea.  - Patient reports he is not able to tolerate CPAP mask.      Patient / Caretaker was advised and educated to call our office  immediately if  patient has any new symptoms of chest pain/shortness of breath, near-syncope, syncope, light headedness sustained palpitations  or any other cardiovascular symptoms before their scheduled follow-up appointment.  Office number was provided #[phone].      Please call [phone] if any questions.  Counseling :      A description of the counseling.  Goals and Barriers.  Patient's ability to self care: Yes  Medication side effect reviewed with patient in detail and all their questions answered to their satisfaction.    HPI :     Thomas Horne is a 79 y.o. male with PMHx of CAD s/p CABG x 3 (2002) and s/p PCI with NANDINI to distal Lcx (2021), chronic stable angina, chronic diastolic heart failure, essential HTN, dyslipidemia, PVCs, moderate to severe aortic valve stenosis, pulmonary hypertension, CKDIII, AAA s/p EVAR (2012), PVD s/p L SFA to popliteal artery bypass (8/30/24) and s/p right lower extremity bypass (11/2023), iron deficiency anemia, DMII, who presents for routine outpatient cardiology follow-up.     11/20/24:        Patient was last seen outpatient cardiology office on 7/17/24.  Today.  Upon trying to get patient's weight patient reported feeling extremely lightheaded.  Patient was  assisted to a wheelchair and brought into room for further evaluation. Unable to obtain orthostatic vital signs given patient's report of lightheadedness and concern for fall.      Patient reports over the last several months he has had episodes of lightheadedness and dizziness, most notably when changing positions.  He denies any feelings of presyncope or syncope.       Patient denies experiencing chest pain, palpitations, shortness of breath, lower extremity edema, orthopnea, weight gain, nausea, vomiting.        Discussed with patient results of September 2024 TTE which noted progression of his aortic valve stenosis from mild to moderate to now moderate to severe.  Discussed with patient process for TAVR evaluation.  At this time he would like additional time to consider and will let us know if he would like to pursue any further workup.      7/17/24:         Patient presents for routine outpatient cardiology follow-up.  Patient was last seen outpatient cardiology office on 5/10/24. Patient had reported experiencing episode of chest pain/pressure on 5/7/24.       5/10/24 cardiology notes indicate that patient was instructed to take nitroglycerin as needed, patient had disclosed that he had not been taking nitroglycerin as needed for chest discomfort/pain.  Patient's Ranexa 500 mg twice daily was increased to Ranexa 1000 mg twice daily and patient's Imdur 60 mg daily was increased to 90 mg daily.      Patient reports since last outpatient cardiology office visit he has overall been doing well but is currently dealing with plantar fasciitis. He states since last office visit he has only needed to use nitroglycerin once.  He states that he been able to do daily activities without chest pain.      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)     Plantar fasciitis     Sleep apnea     Could not tolerate CPAP     Past Surgical History:   Procedure Laterality Date    ABDOMINAL AORTIC ANEURYSM REPAIR      Stented    ADENOIDECTOMY      BACK SURGERY  1985    CARDIAC CATHETERIZATION Left 10/19/2022    Procedure: Cardiac Left Heart Cath;  Surgeon: Danielle Preeira MD;  Location: [facility];  Service: Cardiology    CARDIAC SURGERY  2002    3 cardiac bypass then angioplasty 7/2020    CHOLECYSTECTOMY      COLONOSCOPY  02/14/2024    CORONARY ARTERY BYPASS GRAFT      IR LOWER EXTREMITY ANGIOGRAM  11/01/2023    IR LOWER EXTREMITY ANGIOGRAM  08/07/2024    LAMINECTOMY  1990    DE BYPASS W/VEIN FEMORAL-POPLITEAL Right 11/16/2023    Procedure: BYPASS FEMORAL-POPLITEAL WITH CRYO VEIN, RIGHT FEMORAL ENDARTERECTOMY;  Surgeon: Vasquez Clark MD;  Location: [facility];  Service: Vascular    DE BYPASS W/VEIN FEMORAL-POPLITEAL Left 8/30/2024    Procedure: left lower extremity above knee popliteal to below knee popliteal artery bypass with PTFE graft;  Surgeon: Vasquez Clark MD;  Location: [facility];  Service: Vascular    DE SLCTV CATHJ 3RD+ ORD SLCTV ABDL PEL/LXTR BRNC Right 11/01/2023    Procedure: ARTERIOGRAM Right lower extremity arteriogram with CO2 via right groin access;  Surgeon: Vasquez Clark MD;  Location: [facility];  Service: Vascular    DE SLCTV CATHJ 3RD+ ORD SLCTV ABDL PEL/LXTR BRNC Left 08/07/2024    Procedure: diagnostic LLE Arteriogram;  Surgeon: Vasquez Clark MD;  Location: [facility];  Service: Vascular    PROSTATE SURGERY      TONSILLECTOMY       URINARY SPHINCTER IMPLANT       Social History     Substance and Sexual Activity   Alcohol Use Yes    Alcohol/week: 5.0 standard drinks of alcohol    Types: 5 Cans of beer per week    Comment: stopped last few months     Social History     Substance and Sexual Activity   Drug Use Never     Social History     Tobacco Use   Smoking Status Former    Current packs/day: 0.00    Average packs/day: 1.5 packs/day for 33.0 years (49.5 ttl pk-yrs)    Types: Cigarettes    Start date: 1956    Quit date:     Years since quittin.9    Passive exposure: Past   Smokeless Tobacco Never     Family History:   Family History   Problem Relation Age of Onset    Diabetes Mother     Alcohol abuse Father     Heart disease Brother     Cancer Sister         Thyroid    Cancer Sister         Colon    Cancer Brother         Throat    Thyroid disease Brother     Cancer Brother     Diabetes Sister     Thyroid disease Sister     Mental illness Neg Hx        Meds/Allergies     Allergies   Allergen Reactions    Lisinopril Rash and Lip Swelling       Current Outpatient Medications:     acetaminophen (TYLENOL) 325 mg tablet, Take 2 tablets (650 mg total) by mouth every 6 (six) hours as needed for mild pain, Disp: , Rfl:     amLODIPine (NORVASC) 10 mg tablet, TAKE ONE-HALF TABLET BY MOUTH  DAILY, Disp: 45 tablet, Rfl: 1    aspirin 81 mg chewable tablet, Chew 81 mg daily, Disp: , Rfl:     atorvastatin (LIPITOR) 40 mg tablet, TAKE 1 TABLET BY MOUTH DAILY, Disp: 90 tablet, Rfl: 1    Blood Glucose Monitoring Suppl (OneTouch Verio Reflect) w/Device KIT, Check blood sugars twice daily. Please substitute with appropriate alternative as covered by patient's insurance. Dx: E11.65, Disp: 1 kit, Rfl: 0    cloNIDine (CATAPRES) 0.1 mg tablet, take 1 tablet by mouth every 12 hours, Disp: 180 tablet, Rfl: 1    clopidogrel (PLAVIX) 75 mg tablet, Take 1 tablet (75 mg total) by mouth daily, Disp: 30 tablet, Rfl: 3    Droplet Pen Needles 32G X 4 MM MISC, USE  EVERY EVENING, Disp: 100 each, Rfl: 5    DULoxetine (CYMBALTA) 30 mg delayed release capsule, take 1 capsule by mouth once daily, Disp: 90 capsule, Rfl: 0    Empagliflozin (Jardiance) 25 MG TABS, TAKE 1 TABLET BY MOUTH DAILY, Disp: 90 tablet, Rfl: 1    fenofibrate 160 MG tablet, TAKE 1 TABLET BY MOUTH DAILY, Disp: 100 tablet, Rfl: 1    glucose blood (OneTouch Verio) test strip, TEST TWICE A DAY, Disp: 200 strip, Rfl: 1    insulin glargine (LANTUS) 100 units/mL subcutaneous injection, Inject 15 Units under the skin daily at bedtime (Patient taking differently: Inject 15 Units under the skin daily at bedtime If BS > 150), Disp: 10 mL, Rfl: 0    isosorbide mononitrate (IMDUR) 30 mg 24 hr tablet, TAKE 3 TABLETS BY MOUTH DAILY, Disp: 270 tablet, Rfl: 1    metFORMIN (GLUCOPHAGE) 500 mg tablet, Take 1 tablet (500 mg total) by mouth 2 (two) times a day with meals, Disp: 180 tablet, Rfl: 1    metoprolol tartrate (LOPRESSOR) 50 mg tablet, TAKE ONE-HALF TABLET BY MOUTH  EVERY 12 HOURS, Disp: 90 tablet, Rfl: 1    Multiple Vitamins-Minerals (MULTIVITAMIN MEN 50+ PO), Take by mouth daily, Disp: , Rfl:     Omega-3 Fatty Acids (fish oil) 1,000 mg, Take 4,000 mg by mouth 2 (two) times a day, Disp: , Rfl:     ondansetron (ZOFRAN) 8 mg tablet, Take 0.5 tablets (4 mg total) by mouth every 6 (six) hours as needed for nausea or vomiting, Disp: 20 tablet, Rfl: 0    OneTouch Delica Lancets 33G MISC, Check blood sugars twice daily. Please substitute with appropriate alternative as covered by patient's insurance. Dx: E11.65, Disp: 200 each, Rfl: 3    pantoprazole (PROTONIX) 40 mg tablet, TAKE 1 TABLET BY MOUTH DAILY, Disp: 90 tablet, Rfl: 1    pentoxifylline (TRENtal) 400 mg ER tablet, TAKE 1 TABLET BY MOUTH 3 TIMES  DAILY WITH MEALS, Disp: 270 tablet, Rfl: 3    pregabalin (LYRICA) 150 mg capsule, Take 1 capsule (150 mg total) by mouth 3 (three) times a day (Patient taking differently: Take 150 mg by mouth 2 (two) times a day), Disp: 270  capsule, Rfl: 0    ranolazine (RANEXA) 1000 MG SR tablet, Take 1 tablet (1,000 mg total) by mouth 2 (two) times a day, Disp: 180 tablet, Rfl: 3    sodium hypochlorite (DAKIN'S HALF-STRENGTH) external solution, Apply 1 Application topically every other day At each dressing change, Disp: 473 mL, Rfl: 2    torsemide (DEMADEX) 20 mg tablet, TAKE 1 TABLET BY MOUTH DAILY, Disp: 90 tablet, Rfl: 1    ketoconazole (NIZORAL) 2 % cream, Apply topically daily, Disp: 60 g, Rfl: 1    nitroglycerin (NITROSTAT) 0.4 mg SL tablet, Place 1 tablet (0.4 mg total) under the tongue every 5 (five) minutes as needed for chest pain, Disp: 30 tablet, Rfl: 0    Vitals: Blood pressure 138/64, pulse 56, weight 103 kg (226 lb), SpO2 96%.    Body mass index is 29.02 kg/m².  Wt Readings from Last 3 Encounters:   11/20/24 103 kg (226 lb)   10/31/24 102 kg (225 lb)   10/17/24 104 kg (229 lb 12.8 oz)     Vitals:    11/20/24 0955   Weight: 103 kg (226 lb)         BP Readings from Last 3 Encounters:   11/20/24 138/64   11/20/24 124/64   11/19/24 123/54       Physical Exam:  Physical Exam  Vitals reviewed.   Constitutional:       General: He is not in acute distress.  Cardiovascular:      Rate and Rhythm: Normal rate and regular rhythm.      Pulses: Normal pulses.      Heart sounds: Murmur heard.   Pulmonary:      Effort: Pulmonary effort is normal. No respiratory distress.      Breath sounds: Normal breath sounds.   Abdominal:      General: Abdomen is flat. There is no distension.      Palpations: Abdomen is soft.      Tenderness: There is no abdominal tenderness.   Musculoskeletal:      Right lower leg: No edema.      Left lower leg: No edema.   Skin:     General: Skin is warm and dry.   Neurological:      Mental Status: He is alert and oriented to person, place, and time.     Diagnostic Studies Review Cardio:    11/19/24 EKG: Sinus bradycardia with first-degree AV block, 56 bpm.  Left ventricular hypertrophy with QRS widening.  Cannot rule out septal  infarct, cited on or before 9/6/24.     Cardiac testing:       Echo complete   Result date: 9/07/24    Left Ventricle Left ventricular cavity size is normal. Wall thickness is moderately increased. There is moderate concentric hypertrophy. The left ventricular ejection fraction is 50% by visual estimation. Systolic function is low normal. There is mild global hypokinesis. Diastolic function is moderately abnormal, consistent with grade II (pseudonormal) relaxation.  Left atrial filling pressure is elevated.   Interventricular Septum There is abnormal septal motion consistent with post-operative status. There is sigmoid appearance of the septum.   Right Ventricle Right ventricular cavity size is normal. Systolic function is mildly reduced. Abnormal tricuspid annular plane systolic excursion (TAPSE) < 1.7 cm. Wall thickness is normal.   Left Atrium The atrium is moderately dilated.   Right Atrium The atrium is normal in size.   Aortic Valve The aortic valve is trileaflet. The leaflets are moderately thickened. The leaflets are moderately calcified. There is moderately reduced mobility. There is mild regurgitation. There is moderate to severe stenosis. The aortic valve peak velocity is 3.84 m/s. The aortic valve peak gradient is 60 mmHg. The aortic valve mean gradient is 32 mmHg. The aortic valve area is 1.17 cm2.   Mitral Valve There is mild thickening.  There is mild regurgitation. There is no evidence of stenosis.   Tricuspid Valve Tricuspid valve structure is normal. There is mild regurgitation. There is no evidence of stenosis. The right ventricular systolic pressure is normal. The estimated right ventricular systolic pressure is 38.00 mmHg.   Pulmonic Valve Pulmonic valve structure is normal. There is trace regurgitation. There is no evidence of stenosis.   Ascending Aorta The aortic root is mildly dilated.   IVC/SVC The right atrial pressure is estimated at 8.0 mmHg. The inferior vena cava is dilated.  Respirophasic changes were blunted (less than 50% variation).   Pericardium There is no pericardial effusion. The pericardium is normal in appearance. There is a left pleural effusion.           Lab Review   Lab Results   Component Value Date    WBC 7.26 10/14/2024    HGB 13.7 10/14/2024    HCT 44.7 10/14/2024    MCV 91 10/14/2024    RDW 16.4 (H) 10/14/2024     10/14/2024     BMP:  Lab Results   Component Value Date    SODIUM 142 10/14/2024    K 4.2 10/14/2024     10/14/2024    CO2 31 10/14/2024    BUN 36 (H) 10/14/2024    CREATININE 1.51 (H) 10/14/2024    GLUC 59 (L) 09/07/2024    GLUF 143 (H) 10/14/2024    CALCIUM 9.4 10/14/2024    CORRECTEDCA 9.7 09/06/2024    EGFR 42 10/14/2024    MG 2.2 09/07/2024     LFT:  Lab Results   Component Value Date    AST 22 10/14/2024    ALT 21 10/14/2024    ALKPHOS 40 10/14/2024    TP 7.6 10/14/2024    ALB 4.1 10/14/2024      Lab Results   Component Value Date    MDJ7QVTIXRDZ 1.536 06/17/2023     Lab Results   Component Value Date    HGBA1C 6.2 (H) 10/14/2024     Lipid Profile:   Lab Results   Component Value Date    CHOLESTEROL 175 10/14/2024    HDL 61 10/14/2024    LDLCALC 83 10/14/2024    TRIG 154 (H) 10/14/2024     Naa Cruz PA-C

## 2024-11-20 NOTE — PROGRESS NOTES
HBO Treatment Course Details       Treatment Notes: Patient decided against starting dive.  He stated he felt dizzy and was concerned he would have to have a bowel movement when in the chamber.  He asked for us to not to dive him today     Treatment Course Number:    Total Treatments Ordered:        Diagnosis: No diagnosis found.    HBO Treatment Details:  In-Patient Visit: no  Treatment Length: (Minutes)  Chamber #:      Pre-Treatment details:  Pre-treatment protocol    Left ear clear?: (P) yes  Right ear clear?: (P) yes  Left ear intact?: (P) yes  Right ear intact?: (P) yes  Left ear color: (P) translucent  Right ear color: (P) translucent  PE Tubes present, Left ear?: (P) yes  PE Tubes present, Right ear?: (P) yes  Left ear irrigated?: (P) no  Right ear irrigated?: (P) no  Left ear TEED scale: (P) Grade 0  Right ear TEED scale: (P) Grade 0   Pretreatment heart and lung assessment: (P) Patient is not cleared for HBOT (comment why)     Treatment details:  INDY Rate:    Started Compression:    Reached Compression:    Total Compression Time:   (Minutes)  Total Holding Time:   (Minutes)  Started Decompression:    Reached Surface:    Total Decompression Time:   (Minutes)  Total Airbreaks:   (Minutes)  Total Time of Treatment:   (Minutes)  Symptoms Noted During Treatment:   (Minutes)    Post treatment details:                                                    Vital Signs:  HBO Glucose Reference Range: 100-350 mg/dl   Pre-Treatment Post-Treatment                                           Allergies   Allergen Reactions    Lisinopril Rash and Lip Swelling     Patient Active Problem List    Diagnosis Date Noted    Foot ulcer (HCC) 09/07/2024    SOB (shortness of breath) 09/06/2024    Nausea 09/06/2024    Elevated troponin 09/06/2024    History of DVT (deep vein thrombosis) 08/26/2024    Diabetic ulcer of toe of left foot associated with type 2 diabetes mellitus, limited to breakdown of skin (HCC) 08/08/2024    Plantar  fasciitis, right 07/10/2024    Acute deep vein thrombosis (DVT) of left peroneal vein (HCC) 05/01/2024    Iron deficiency anemia 04/23/2024    Shortness of breath 04/09/2024    History of colon polyps 02/22/2024    Duodenal ulcer 02/01/2024    Multiple gastric ulcers 12/27/2023    Iron deficiency anemia due to chronic blood loss 12/27/2023    Melena 12/26/2023    Symptomatic anemia 12/26/2023    Frequent PVCs 06/17/2023    Acute on chronic diastolic heart failure (HCC) 06/16/2023    Stage 3b chronic kidney disease ([facility]) 06/16/2023    Diabetic ulcer of right midfoot associated with diabetes mellitus due to underlying condition, limited to breakdown of skin ([facility]) 06/15/2023    Hypertensive urgency 05/11/2023    Elevated troponin level not due myocardial infarction 05/11/2023    Stable angina pectoris ([facility]) 03/07/2023    Chronic kidney disease-mineral and bone disorder 11/30/2022    Mild aortic stenosis 11/11/2022    Chronic HFpEF/Moderate pHTN (HFpEF) ([facility]) 11/10/2022    Gross hematuria 07/26/2022    Platelets decreased ([facility]) 07/22/2022    Acute kidney injury superimposed on chronic kidney disease  ([facility]) 02/16/2022    Actinic keratoses 01/14/2022    Type 2 diabetes mellitus with diabetic peripheral angiopathy without gangrene, with long-term current use of insulin ([facility]) 01/11/2022    Varicose veins of left lower extremity 06/25/2021    History of endovascular stent graft for abdominal aortic aneurysm (AAA) 06/25/2021    Bruit (arterial) 06/25/2021    Peripheral artery disease (HCC) 06/25/2021    Type 2 diabetes mellitus with diabetic polyneuropathy, with long-term current use of insulin ([facility]) 06/10/2021    Mixed hyperlipidemia 05/11/2021    Primary hypertension 05/11/2021    Coronary artery disease involving native coronary artery of native heart without angina pectoris 05/11/2021    S/P angioplasty with stent 05/11/2021    Hx of CABG 05/11/2021    S/P AAA repair 05/11/2021    S/P prostatectomy 05/11/2021    H/O  prostate cancer 05/11/2021     No orders of the defined types were placed in this encounter.

## 2024-11-20 NOTE — PROGRESS NOTES
Pt did not feel well, he was a little lightheaded and he did not want to dive - so treatment was not started.  Pt was offered top go to the ED - pt refused

## 2024-11-21 ENCOUNTER — OFFICE VISIT (OUTPATIENT)
Dept: WOUND CARE | Facility: HOSPITAL | Age: 79
End: 2024-11-21
Payer: COMMERCIAL

## 2024-11-21 DIAGNOSIS — E11.621 DIABETIC ULCER OF LEFT FOOT ASSOCIATED WITH TYPE 2 DIABETES MELLITUS, WITH MUSCLE INVOLVEMENT WITHOUT EVIDENCE OF NECROSIS, UNSPECIFIED PART OF FOOT (HCC): ICD-10-CM

## 2024-11-21 DIAGNOSIS — L97.525 DIABETIC ULCER OF LEFT FOOT ASSOCIATED WITH TYPE 2 DIABETES MELLITUS, WITH MUSCLE INVOLVEMENT WITHOUT EVIDENCE OF NECROSIS, UNSPECIFIED PART OF FOOT (HCC): ICD-10-CM

## 2024-11-21 PROCEDURE — 99183 HYPERBARIC OXYGEN THERAPY: CPT | Performed by: FAMILY MEDICINE

## 2024-11-21 PROCEDURE — G0277 HBOT, FULL BODY CHAMBER, 30M: HCPCS | Performed by: FAMILY MEDICINE

## 2024-11-21 PROCEDURE — 82948 REAGENT STRIP/BLOOD GLUCOSE: CPT | Performed by: FAMILY MEDICINE

## 2024-11-21 NOTE — PROGRESS NOTES
HBO Treatment Course Details       Treatment Notes: Patient tolerated HBOT well.  No complaints     Treatment Course Number: 17  Total Treatments Ordered:  40     Diagnosis:   1. Diabetic ulcer of left foot associated with type 2 diabetes mellitus, with muscle involvement without evidence of necrosis, unspecified part of foot (HCC)  [facility]ic oxygen [facility]          HBO Treatment Details:  In-Patient Visit: no  Treatment Length:90 Minutes(Minutes)  Chamber #: Hard sided Monoplace Chamber    Pre-Treatment details:  Pre-treatment protocol Treatment Protocol: 2.5 INDY X 90 minutes w/ 100% oxygen, two 5 minute air breaks  Left ear clear?: yes  Right ear clear?: yes  Left ear intact?: yes  Right ear intact?: yes        PE Tubes present, Left ear?: yes  PE Tubes present, Right ear?: yes  Left ear irrigated?: no  Right ear irrigated?: no  Left ear TEED scale: Grade 0  Right ear TEED scale: Grade 0   Pretreatment heart and lung assessment: Pretreatment heart and lung auscltation unremarkable. Patient cleared for HBOT     Treatment details:  INDY Rate: 2.5  Started Compression: 0820  Reached Compression: 0830  Total Compression Time: 10 (Minutes)  Total Holding Time: 100 (Minutes)  Started Decompression: 1010  Reached Surface: 1020  Total Decompression Time: 10 (Minutes)  Total Airbreaks:   (Minutes)  Total Time of Treatment: 120 (Minutes)  Symptoms Noted During Treatment: None (Minutes)    Post treatment details:  Left ear clear?: yes  Right ear clear?: yes              PE Tubes present, Left ear?: yes  PE Tubes present, Right ear?: yes        Left ear TEED scale: Grade 0  Right ear TEED scale: Grade 0  Post treatment heart and lung assessment: Post treatment heart and lung auscltation unremarkable. Patient cleared for discharge. Tolerated treatment well.             Vital Signs:  HBO Glucose Reference Range: 100-350 mg/dl   Pre-Treatment Post-Treatment   Time vitals are taken: 0810 Time vitals are taken: 1022   Blood  Pressure: 170/89 Blood Pressure: 136/68   Pulse: 69 Pulse: (!) 48   Resp: 16 Resp: 18   Temp: 97.3 °F (36.3 °C) Temp: 97.4 °F (36.3 °C)   Pre-Inspection Glucose readin Post-Inspection Glucose readin       Allergies   Allergen Reactions    Lisinopril Rash and Lip Swelling     Patient Active Problem List    Diagnosis Date Noted    Foot ulcer (HCC) 2024    SOB (shortness of breath) 2024    Nausea 2024    Elevated troponin 2024    History of DVT (deep vein thrombosis) 2024    Diabetic ulcer of toe of left foot associated with type 2 diabetes mellitus, limited to breakdown of skin ([facility]) 2024    Plantar fasciitis, right 07/10/2024    Acute deep vein thrombosis (DVT) of left peroneal vein ([facility]) 2024    Iron deficiency anemia 2024    Shortness of breath 2024    History of colon polyps 2024    Duodenal ulcer 2024    Multiple gastric ulcers 2023    Iron deficiency anemia due to chronic blood loss 2023    Melena 2023    Symptomatic anemia 2023    Frequent PVCs 2023    Acute on chronic diastolic heart failure ([facility]) 2023    Stage 3b chronic kidney disease ([facility]) 2023    Diabetic ulcer of right midfoot associated with diabetes mellitus due to underlying condition, limited to breakdown of skin ([facility]) 06/15/2023    Hypertensive urgency 2023    Elevated troponin level not due myocardial infarction 2023    Stable angina pectoris ([facility]) 2023    Chronic kidney disease-mineral and bone disorder 2022    Mild aortic stenosis 2022    Chronic HFpEF/Moderate pHTN (HFpEF) ([facility]) 11/10/2022    Gross hematuria 2022    Platelets decreased ([facility]) 2022    Acute kidney injury superimposed on chronic kidney disease  ([facility]) 2022    Actinic keratoses 2022    Type 2 diabetes mellitus with diabetic peripheral angiopathy without gangrene, with long-term current use of insulin ([facility])  01/11/2022    Varicose veins of left lower extremity 06/25/2021    History of endovascular stent graft for abdominal aortic aneurysm (AAA) 06/25/2021    Bruit (arterial) 06/25/2021    Peripheral artery disease (HCC) 06/25/2021    Type 2 diabetes mellitus with diabetic polyneuropathy, with long-term current use of insulin (HCC) 06/10/2021    Mixed hyperlipidemia 05/11/2021    Primary hypertension 05/11/2021    Coronary artery disease involving native coronary artery of native heart without angina pectoris 05/11/2021    S/P angioplasty with stent 05/11/2021    Hx of CABG 05/11/2021    S/P AAA repair 05/11/2021    S/P prostatectomy 05/11/2021    H/O prostate cancer 05/11/2021     No orders of the defined types were placed in this encounter.

## 2024-11-22 ENCOUNTER — OFFICE VISIT (OUTPATIENT)
Dept: WOUND CARE | Facility: HOSPITAL | Age: 79
End: 2024-11-22
Payer: COMMERCIAL

## 2024-11-22 DIAGNOSIS — E11.621 DIABETIC ULCER OF LEFT FOOT ASSOCIATED WITH TYPE 2 DIABETES MELLITUS, WITH MUSCLE INVOLVEMENT WITHOUT EVIDENCE OF NECROSIS, UNSPECIFIED PART OF FOOT (HCC): Primary | ICD-10-CM

## 2024-11-22 DIAGNOSIS — L97.525 DIABETIC ULCER OF LEFT FOOT ASSOCIATED WITH TYPE 2 DIABETES MELLITUS, WITH MUSCLE INVOLVEMENT WITHOUT EVIDENCE OF NECROSIS, UNSPECIFIED PART OF FOOT (HCC): Primary | ICD-10-CM

## 2024-11-22 LAB
GLUCOSE SERPL-MCNC: 181 MG/DL (ref 65–140)
GLUCOSE SERPL-MCNC: 200 MG/DL (ref 65–140)
GLUCOSE SERPL-MCNC: 226 MG/DL (ref 65–140)
GLUCOSE SERPL-MCNC: 283 MG/DL (ref 65–140)

## 2024-11-22 PROCEDURE — 82948 REAGENT STRIP/BLOOD GLUCOSE: CPT | Performed by: FAMILY MEDICINE

## 2024-11-22 PROCEDURE — G0277 HBOT, FULL BODY CHAMBER, 30M: HCPCS | Performed by: FAMILY MEDICINE

## 2024-11-22 NOTE — PROGRESS NOTES
HBO Treatment Course Details       Treatment Notes: Treatment tolerated well.  No complaints of pain or discomfort     Treatment Course Number:    Total Treatments Ordered:        Diagnosis: No diagnosis found.    HBO Treatment Details:  In-Patient Visit: no  Treatment Length: (Minutes)  Chamber #:      Pre-Treatment details:  Pre-treatment protocol    Left ear clear?: yes  Right ear clear?:  (some old blood lying against TM)  Left ear intact?: yes  Right ear intact?: yes        PE Tubes present, Left ear?: yes  PE Tubes present, Right ear?: yes  Left ear irrigated?: no  Right ear irrigated?: no  Left ear TEED scale: Grade 0  Right ear TEED scale: Grade 0   Pretreatment heart and lung assessment: Pretreatment heart and lung auscltation unremarkable. Patient cleared for HBOT     Treatment details:  INDY Rate:    Started Compression:    Reached Compression:    Total Compression Time:   (Minutes)  Total Holding Time:   (Minutes)  Started Decompression:    Reached Surface:    Total Decompression Time:   (Minutes)  Total Airbreaks:   (Minutes)  Total Time of Treatment:   (Minutes)  Symptoms Noted During Treatment: None (Minutes)    Post treatment details:  Left ear clear?: yes  Right ear clear?: yes              PE Tubes present, Left ear?: yes  PE Tubes present, Right ear?: yes        Left ear TEED scale: Grade 0  Right ear TEED scale: Grade 0  Post treatment heart and lung assessment: Post treatment heart and lung auscltation unremarkable. Patient cleared for discharge. Tolerated treatment well.             Vital Signs:  HBO Glucose Reference Range: 100-350 mg/dl   Pre-Treatment Post-Treatment     Time vitals are taken: 1025     Blood Pressure: 144/70     Pulse: 60     Resp: 18     Temp: 97.2 °F (36.2 °C)     Post-Inspection Glucose readin       Allergies   Allergen Reactions    Lisinopril Rash and Lip Swelling     Patient Active Problem List    Diagnosis Date Noted    Foot ulcer (HCC) 2024    SOB (shortness  of breath) 09/06/2024    Nausea 09/06/2024    Elevated troponin 09/06/2024    History of DVT (deep vein thrombosis) 08/26/2024    Diabetic ulcer of toe of left foot associated with type 2 diabetes mellitus, limited to breakdown of skin ([facility]) 08/08/2024    Plantar fasciitis, right 07/10/2024    Acute deep vein thrombosis (DVT) of left peroneal vein ([facility]) 05/01/2024    Iron deficiency anemia 04/23/2024    Shortness of breath 04/09/2024    History of colon polyps 02/22/2024    Duodenal ulcer 02/01/2024    Multiple gastric ulcers 12/27/2023    Iron deficiency anemia due to chronic blood loss 12/27/2023    Melena 12/26/2023    Symptomatic anemia 12/26/2023    Frequent PVCs 06/17/2023    Acute on chronic diastolic heart failure ([facility]) 06/16/2023    Stage 3b chronic kidney disease ([facility]) 06/16/2023    Diabetic ulcer of right midfoot associated with diabetes mellitus due to underlying condition, limited to breakdown of skin ([facility]) 06/15/2023    Hypertensive urgency 05/11/2023    Elevated troponin level not due myocardial infarction 05/11/2023    Stable angina pectoris ([facility]) 03/07/2023    Chronic kidney disease-mineral and bone disorder 11/30/2022    Mild aortic stenosis 11/11/2022    Chronic HFpEF/Moderate pHTN (HFpEF) ([facility]) 11/10/2022    Gross hematuria 07/26/2022    Platelets decreased ([facility]) 07/22/2022    Acute kidney injury superimposed on chronic kidney disease  ([facility]) 02/16/2022    Actinic keratoses 01/14/2022    Type 2 diabetes mellitus with diabetic peripheral angiopathy without gangrene, with long-term current use of insulin ([facility]) 01/11/2022    Varicose veins of left lower extremity 06/25/2021    History of endovascular stent graft for abdominal aortic aneurysm (AAA) 06/25/2021    Bruit (arterial) 06/25/2021    Peripheral artery disease (HCC) 06/25/2021    Type 2 diabetes mellitus with diabetic polyneuropathy, with long-term current use of insulin ([facility]) 06/10/2021    Mixed hyperlipidemia 05/11/2021    Primary  hypertension 05/11/2021    Coronary artery disease involving native coronary artery of native heart without angina pectoris 05/11/2021    S/P angioplasty with stent 05/11/2021    Hx of CABG 05/11/2021    S/P AAA repair 05/11/2021    S/P prostatectomy 05/11/2021    H/O prostate cancer 05/11/2021     No orders of the defined types were placed in this encounter.

## 2024-11-24 DIAGNOSIS — I12.9 BENIGN HYPERTENSION WITH CKD (CHRONIC KIDNEY DISEASE) STAGE III (HCC): ICD-10-CM

## 2024-11-24 DIAGNOSIS — N18.30 BENIGN HYPERTENSION WITH CKD (CHRONIC KIDNEY DISEASE) STAGE III (HCC): ICD-10-CM

## 2024-11-25 ENCOUNTER — OFFICE VISIT (OUTPATIENT)
Dept: WOUND CARE | Facility: HOSPITAL | Age: 79
End: 2024-11-25
Payer: COMMERCIAL

## 2024-11-25 VITALS
SYSTOLIC BLOOD PRESSURE: 158 MMHG | DIASTOLIC BLOOD PRESSURE: 64 MMHG | TEMPERATURE: 96.4 F | RESPIRATION RATE: 18 BRPM | HEART RATE: 61 BPM

## 2024-11-25 DIAGNOSIS — E11.621 DIABETIC ULCER OF LEFT FOOT ASSOCIATED WITH TYPE 2 DIABETES MELLITUS, WITH MUSCLE INVOLVEMENT WITHOUT EVIDENCE OF NECROSIS, UNSPECIFIED PART OF FOOT (HCC): ICD-10-CM

## 2024-11-25 DIAGNOSIS — L97.525 DIABETIC ULCER OF LEFT FOOT ASSOCIATED WITH TYPE 2 DIABETES MELLITUS, WITH MUSCLE INVOLVEMENT WITHOUT EVIDENCE OF NECROSIS, UNSPECIFIED PART OF FOOT (HCC): ICD-10-CM

## 2024-11-25 LAB
GLUCOSE SERPL-MCNC: 223 MG/DL (ref 65–140)
GLUCOSE SERPL-MCNC: 240 MG/DL (ref 65–140)

## 2024-11-25 PROCEDURE — 82948 REAGENT STRIP/BLOOD GLUCOSE: CPT

## 2024-11-25 PROCEDURE — 99183 HYPERBARIC OXYGEN THERAPY: CPT | Performed by: PHYSICIAN ASSISTANT

## 2024-11-25 PROCEDURE — G0277 HBOT, FULL BODY CHAMBER, 30M: HCPCS

## 2024-11-25 RX ORDER — CLONIDINE HYDROCHLORIDE 0.1 MG/1
0.1 TABLET ORAL EVERY 12 HOURS
Qty: 180 TABLET | Refills: 1 | Status: SHIPPED | OUTPATIENT
Start: 2024-11-25

## 2024-11-25 NOTE — PROGRESS NOTES
HBO Treatment Course Details       Treatment Notes: Treatment tolerated well.  No complaints of pain or discomfort.  Outer dressing reapplied to kelly murry toe     Treatment Course Number: 19  Total Treatments Ordered:  40     Diagnosis:   1. Diabetic ulcer of left foot associated with type 2 diabetes mellitus, with muscle involvement without evidence of necrosis, unspecified part of foot (HCC)  [facility]          HBO Treatment Details:  In-Patient Visit: no  Treatment Length:90 Minutes(Minutes)  Chamber #: Hard sided Monoplace Chamber    Pre-Treatment details:  Pre-treatment protocol Treatment Protocol: 2.5 INDY X 90 minutes w/ 100% oxygen, two 5 minute air breaks  Left ear clear?: yes  Right ear clear?: yes  Left ear intact?: yes  Right ear intact?: yes        PE Tubes present, Left ear?: yes  PE Tubes present, Right ear?: yes  Left ear irrigated?: no  Right ear irrigated?: no  Left ear TEED scale: Grade 0  Right ear TEED scale: Grade 0   Pretreatment heart and lung assessment: Pretreatment heart and lung auscltation unremarkable. Patient cleared for HBOT     Treatment details:  NIDY Rate: 2.5  Started Compression: 0821  Reached Compression: 0831  Total Compression Time: 10 (Minutes)  Total Holding Time: 100 (Minutes)  Started Decompression: 1011  Reached Surface: 1021  Total Decompression Time: 10 (Minutes)  Total Airbreaks:   (Minutes)  Total Time of Treatment: 120 (Minutes)  Symptoms Noted During Treatment: None (Minutes)    Post treatment details:  Left ear clear?: yes  Right ear clear?: yes  Left ears intact?: yes  Right ears intact?: yes        PE Tubes present, Left ear?: yes  PE Tubes present, Right ear?: yes        Left ear TEED scale: Grade 0  Right ear TEED scale: Grade 0  Post treatment heart and lung assessment: Post treatment heart and lung auscltation unremarkable. Patient cleared for discharge. Tolerated treatment well.             Vital Signs:  HBO Glucose Reference Range: 100-350  mg/dl   Pre-Treatment Post-Treatment   Time vitals are taken: 0805 Time vitals are taken: 1025   Blood Pressure: 158/64 Blood Pressure: 114/62   Pulse: 61 Pulse: 58   Resp: 18 Resp: 18   Temp: 96.4 °F (35.8 °C) Temp: 96.7 °F (35.9 °C)   Pre-Inspection Glucose readin Post-Inspection Glucose readin       Allergies   Allergen Reactions    Lisinopril Rash and Lip Swelling     Patient Active Problem List    Diagnosis Date Noted    Foot ulcer (HCC) 2024    SOB (shortness of breath) 2024    Nausea 2024    Elevated troponin 2024    History of DVT (deep vein thrombosis) 2024    Diabetic ulcer of toe of left foot associated with type 2 diabetes mellitus, limited to breakdown of skin ([facility]) 2024    Plantar fasciitis, right 07/10/2024    Acute deep vein thrombosis (DVT) of left peroneal vein ([facility]) 2024    Iron deficiency anemia 2024    Shortness of breath 2024    History of colon polyps 2024    Duodenal ulcer 2024    Multiple gastric ulcers 2023    Iron deficiency anemia due to chronic blood loss 2023    Melena 2023    Symptomatic anemia 2023    Frequent PVCs 2023    Acute on chronic diastolic heart failure (HCC) 2023    Stage 3b chronic kidney disease ([facility]) 2023    Diabetic ulcer of right midfoot associated with diabetes mellitus due to underlying condition, limited to breakdown of skin ([facility]) 06/15/2023    Hypertensive urgency 2023    Elevated troponin level not due myocardial infarction 2023    Stable angina pectoris ([facility]) 2023    Chronic kidney disease-mineral and bone disorder 2022    Mild aortic stenosis 2022    Chronic HFpEF/Moderate pHTN (HFpEF) ([facility]) 11/10/2022    Gross hematuria 2022    Platelets decreased ([facility]) 2022    Acute kidney injury superimposed on chronic kidney disease  ([facility]) 2022    Actinic keratoses 2022    Type 2 diabetes mellitus with  diabetic peripheral angiopathy without gangrene, with long-term current use of insulin ([facility]) 01/11/2022    Varicose veins of left lower extremity 06/25/2021    History of endovascular stent graft for abdominal aortic aneurysm (AAA) 06/25/2021    Bruit (arterial) 06/25/2021    Peripheral artery disease (HCC) 06/25/2021    Type 2 diabetes mellitus with diabetic polyneuropathy, with long-term current use of insulin ([facility]) 06/10/2021    Mixed hyperlipidemia 05/11/2021    Primary hypertension 05/11/2021    Coronary artery disease involving native coronary artery of native heart without angina pectoris 05/11/2021    S/P angioplasty with stent 05/11/2021    Hx of CABG 05/11/2021    S/P AAA repair 05/11/2021    S/P prostatectomy 05/11/2021    H/O prostate cancer 05/11/2021     No orders of the defined types were placed in this encounter.

## 2024-11-26 ENCOUNTER — OFFICE VISIT (OUTPATIENT)
Dept: WOUND CARE | Facility: HOSPITAL | Age: 79
End: 2024-11-26
Payer: COMMERCIAL

## 2024-11-26 VITALS
DIASTOLIC BLOOD PRESSURE: 77 MMHG | SYSTOLIC BLOOD PRESSURE: 148 MMHG | HEART RATE: 62 BPM | TEMPERATURE: 97.6 F | RESPIRATION RATE: 18 BRPM

## 2024-11-26 VITALS
HEART RATE: 64 BPM | SYSTOLIC BLOOD PRESSURE: 158 MMHG | DIASTOLIC BLOOD PRESSURE: 83 MMHG | RESPIRATION RATE: 15 BRPM | TEMPERATURE: 97.6 F

## 2024-11-26 DIAGNOSIS — E11.621 DIABETIC ULCER OF LEFT FOOT ASSOCIATED WITH TYPE 2 DIABETES MELLITUS, WITH MUSCLE INVOLVEMENT WITHOUT EVIDENCE OF NECROSIS, UNSPECIFIED PART OF FOOT (HCC): ICD-10-CM

## 2024-11-26 DIAGNOSIS — E11.621 DIABETIC ULCER OF LEFT FOOT ASSOCIATED WITH TYPE 2 DIABETES MELLITUS, WITH MUSCLE INVOLVEMENT WITHOUT EVIDENCE OF NECROSIS, UNSPECIFIED PART OF FOOT (HCC): Primary | ICD-10-CM

## 2024-11-26 DIAGNOSIS — E11.51 TYPE 2 DIABETES MELLITUS WITH DIABETIC PERIPHERAL ANGIOPATHY WITHOUT GANGRENE, WITH LONG-TERM CURRENT USE OF INSULIN (HCC): ICD-10-CM

## 2024-11-26 DIAGNOSIS — I73.9 PERIPHERAL ARTERY DISEASE (HCC): ICD-10-CM

## 2024-11-26 DIAGNOSIS — L97.525 DIABETIC ULCER OF LEFT FOOT ASSOCIATED WITH TYPE 2 DIABETES MELLITUS, WITH MUSCLE INVOLVEMENT WITHOUT EVIDENCE OF NECROSIS, UNSPECIFIED PART OF FOOT (HCC): Primary | ICD-10-CM

## 2024-11-26 DIAGNOSIS — L97.511 DIABETIC ULCER OF TOE OF RIGHT FOOT ASSOCIATED WITH TYPE 2 DIABETES MELLITUS, LIMITED TO BREAKDOWN OF SKIN (HCC): ICD-10-CM

## 2024-11-26 DIAGNOSIS — E11.621 DIABETIC ULCER OF TOE OF RIGHT FOOT ASSOCIATED WITH TYPE 2 DIABETES MELLITUS, LIMITED TO BREAKDOWN OF SKIN (HCC): ICD-10-CM

## 2024-11-26 DIAGNOSIS — L97.525 DIABETIC ULCER OF LEFT FOOT ASSOCIATED WITH TYPE 2 DIABETES MELLITUS, WITH MUSCLE INVOLVEMENT WITHOUT EVIDENCE OF NECROSIS, UNSPECIFIED PART OF FOOT (HCC): ICD-10-CM

## 2024-11-26 DIAGNOSIS — Z79.4 TYPE 2 DIABETES MELLITUS WITH DIABETIC PERIPHERAL ANGIOPATHY WITHOUT GANGRENE, WITH LONG-TERM CURRENT USE OF INSULIN (HCC): ICD-10-CM

## 2024-11-26 LAB
GLUCOSE SERPL-MCNC: 198 MG/DL (ref 65–140)
GLUCOSE SERPL-MCNC: 368 MG/DL (ref 65–140)

## 2024-11-26 PROCEDURE — 99183 HYPERBARIC OXYGEN THERAPY: CPT | Performed by: FAMILY MEDICINE

## 2024-11-26 PROCEDURE — 11042 DBRDMT SUBQ TIS 1ST 20SQCM/<: CPT | Performed by: FAMILY MEDICINE

## 2024-11-26 PROCEDURE — 82948 REAGENT STRIP/BLOOD GLUCOSE: CPT | Performed by: FAMILY MEDICINE

## 2024-11-26 PROCEDURE — G0277 HBOT, FULL BODY CHAMBER, 30M: HCPCS | Performed by: FAMILY MEDICINE

## 2024-11-26 RX ORDER — LIDOCAINE HYDROCHLORIDE 40 MG/ML
5 SOLUTION TOPICAL ONCE
Status: COMPLETED | OUTPATIENT
Start: 2024-11-26 | End: 2024-11-26

## 2024-11-26 RX ADMIN — LIDOCAINE HYDROCHLORIDE 5 ML: 40 SOLUTION TOPICAL at 10:35

## 2024-11-26 NOTE — ASSESSMENT & PLAN NOTE
Lab Results   Component Value Date    HGBA1C 6.2 (H) 10/14/2024       Orders:    lidocaine (XYLOCAINE) 4 % topical solution 5 mL    Wound cleansing and dressings; Future    Wound Procedure Treatment

## 2024-11-26 NOTE — PATIENT INSTRUCTIONS
Orders Placed This Encounter   Procedures    Wound cleansing and dressings     RIGHT GREAT TOE WOUND and LEFT HEEL WOUND:     Aurix product has been applied to your right toe wound and left heel wound. It is covered with Tegaderm and then 4x4 gauze and rolled gauze and secured with tape.  Please do NOT remove the Tegaderm.  The rolled gauze and 4x4 gauze pad may be removed and new applied if there is drainage. The Tegaderm will be removed at your next wound center appointment.        Off-loading Instructions:     Keep weight and pressure off wound at all times. and Wear off-loading OOFOS slides are ok as directed by your physician. Put on immediately when rising in the morning and remove when going to bed.      Wear off-loading device as directed by your physician (Globo Ped). Put on immediately when rising in the morning and remove when going to bed and Turn every 2 hours. Avoid position directing pressure to Wound site. Limit side lying to 30 degree tilt. Limit the head of bed elevation to 30 degrees. Limit walking and standing to only necessity for activities of daily living.      Wound infection: If you have signs of infection please call the wound center. If the wound center is closedplease go to the Emergency department. Some signs of infection: fever, chills, increased redness, red streaks, increase in pain, increased drainage. Drainage with an odor, Change in drainage color: white/milky/green/tan/yellow, an increase in swelling, chest pain and/or shortness of breath.     Protein: Eat protein with each meal to promote healing. Examples of protein are fish, meat, chicken, nuts, peanut butter, eggs, lentils, edamame or a protein shake.     Standing Status:   Future     Expiration Date:   12/3/2024    Wound Procedure Treatment     This order was created via procedure documentation

## 2024-11-26 NOTE — PROGRESS NOTES
Wound Procedure Treatment    Performed by: Graciela Philip RN  Authorized by: Nitza Sotelo DO    Associated wounds:   Wound 08/15/24 Diabetic Ulcer Heel Left  Wound 11/08/24 Diabetic Ulcer Toe D1, great Right;Posterior  Wound cleansed with:  NSS  Applied to periwound:  Skin prep  Applied secondary dressing:  ABD and Gauze  Dressing secured with:  Ender and Tape  Offloading device appllied:  Heel offloading boot  Comments:  Aurex product, wound veil dressing and skin prep and fenestrated tegaderm applied to wounds by physician.  Gauze and rolled gauze applied to toe wound, secured with tape. ABD and rolled gauze, tape applied to left heel wound.

## 2024-11-26 NOTE — PROGRESS NOTES
HBO Treatment Course Details       Treatment Notes: Treatment tolerated well no complaints of pain or discomfort.  Wounds are improving but have not yet completely resolved.  Recommend continuing HBO treatments till wounds have completely healed.     Treatment Course Number: 20  Total Treatments Ordered:  40     Diagnosis:   1. Diabetic ulcer of left foot associated with type 2 diabetes mellitus, with muscle involvement without evidence of necrosis, unspecified part of foot (HCC)  [place]          HBO Treatment Details:  In-Patient Visit: no  Treatment Length:90 Minutes(Minutes)  Chamber #: Hard sided Monoplace Chamber    Pre-Treatment details:  Pre-treatment protocol Treatment Protocol: 2.5 INDY X 90 minutes w/ 100% oxygen, two 5 minute air breaks  Left ear clear?: yes  Right ear clear?: yes  Left ear intact?: yes  Right ear intact?: yes        PE Tubes present, Left ear?: yes  PE Tubes present, Right ear?: yes  Left ear irrigated?: no  Right ear irrigated?: no  Left ear TEED scale: Grade 0  Right ear TEED scale: Grade 0   Pretreatment heart and lung assessment: Pretreatment heart and lung auscltation unremarkable. Patient cleared for HBOT     Treatment details:  INDY Rate: 2.5  Started Compression: 0812  Reached Compression: 0822  Total Compression Time: 10 (Minutes)  Total Holding Time: 100 (Minutes)  Started Decompression: 1002  Reached Surface: 1012  Total Decompression Time: 10 (Minutes)  Total Airbreaks:   (Minutes)  Total Time of Treatment: 120 (Minutes)  Symptoms Noted During Treatment: None (Minutes)    Post treatment details:  Left ear clear?: yes  Right ear clear?: yes              PE Tubes present, Left ear?: yes  PE Tubes present, Right ear?: yes        Left ear TEED scale: Grade 0  Right ear TEED scale: Grade 0  Post treatment heart and lung assessment: Post treatment heart and lung auscltation unremarkable. Patient cleared for discharge. Tolerated treatment well.             Vital  Signs:  HBO Glucose Reference Range: 100-350 mg/dl   Pre-Treatment Post-Treatment   Time vitals are taken: 0805 Time vitals are taken: 1015   Blood Pressure: 148/77 Blood Pressure: 158/83   Pulse: 62 Pulse: 54   Resp: 18 Resp: 18   Temp: 97.6 °F (36.4 °C) Temp: 97.6 °F (36.4 °C)   Pre-Inspection Glucose readin Post-Inspection Glucose readin       Allergies   Allergen Reactions    Lisinopril Rash and Lip Swelling     Patient Active Problem List    Diagnosis Date Noted    Foot ulcer (HCC) 2024    SOB (shortness of breath) 2024    Nausea 2024    Elevated troponin 2024    History of DVT (deep vein thrombosis) 2024    Diabetic ulcer of toe of left foot associated with type 2 diabetes mellitus, limited to breakdown of skin ([facility]) 2024    Plantar fasciitis, right 07/10/2024    Acute deep vein thrombosis (DVT) of left peroneal vein ([facility]) 2024    Iron deficiency anemia 2024    Shortness of breath 2024    History of colon polyps 2024    Duodenal ulcer 2024    Multiple gastric ulcers 2023    Iron deficiency anemia due to chronic blood loss 2023    Melena 2023    Symptomatic anemia 2023    Frequent PVCs 2023    Acute on chronic diastolic heart failure (HCC) 2023    Stage 3b chronic kidney disease ([facility]) 2023    Diabetic ulcer of right midfoot associated with diabetes mellitus due to underlying condition, limited to breakdown of skin ([facility]) 06/15/2023    Hypertensive urgency 2023    Elevated troponin level not due myocardial infarction 2023    Stable angina pectoris ([facility]) 2023    Chronic kidney disease-mineral and bone disorder 2022    Mild aortic stenosis 2022    Chronic HFpEF/Moderate pHTN (HFpEF) ([facility]) 11/10/2022    Gross hematuria 2022    Platelets decreased ([facility]) 2022    Acute kidney injury superimposed on chronic kidney disease  ([facility]) 2022    Actinic keratoses  01/14/2022    Type 2 diabetes mellitus with diabetic peripheral angiopathy without gangrene, with long-term current use of insulin ([facility]) 01/11/2022    Varicose veins of left lower extremity 06/25/2021    History of endovascular stent graft for abdominal aortic aneurysm (AAA) 06/25/2021    Bruit (arterial) 06/25/2021    Peripheral artery disease (HCC) 06/25/2021    Type 2 diabetes mellitus with diabetic polyneuropathy, with long-term current use of insulin ([facility]) 06/10/2021    Mixed hyperlipidemia 05/11/2021    Primary hypertension 05/11/2021    Coronary artery disease involving native coronary artery of native heart without angina pectoris 05/11/2021    S/P angioplasty with stent 05/11/2021    Hx of CABG 05/11/2021    S/P AAA repair 05/11/2021    S/P prostatectomy 05/11/2021    H/O prostate cancer 05/11/2021     No orders of the defined types were placed in this encounter.

## 2024-11-26 NOTE — PROGRESS NOTES
Name: Thomas Horne      : 1945      MRN: [id number]  Encounter Provider: Nitza Sotelo DO  Encounter Date: 2024   Encounter department: [facility] WOUND CARE  :  Assessment & Plan  Diabetic ulcer of left foot associated with type 2 diabetes mellitus, with muscle involvement without evidence of necrosis, unspecified part of foot (HCC)  Improved  Wound debrided as below  Second application of Aurix completed today as part of the trial  Proper offloading and tight diabetic control again discussed  Continue HBO treatments  Follow-up in 1 week or call sooner with questions or concerns    Lab Results   Component Value Date    HGBA1C 6.2 (H) 10/14/2024       Orders:    lidocaine (XYLOCAINE) 4 % topical solution 5 mL    Wound cleansing and dressings; Future    Wound Procedure Treatment    Debridement    Diabetic ulcer of toe of right foot associated with type 2 diabetes mellitus, limited to breakdown of skin (HCC)  Essentially unchanged  Second application of Aurix completed today as part of the trial    Lab Results   Component Value Date    HGBA1C 6.2 (H) 10/14/2024       Orders:    lidocaine (XYLOCAINE) 4 % topical solution 5 mL    Wound cleansing and dressings; Future    Wound Procedure Treatment    Debridement    Peripheral artery disease (HCC)    Orders:    lidocaine (XYLOCAINE) 4 % topical solution 5 mL    Wound cleansing and dressings; Future    Wound Procedure Treatment    Type 2 diabetes mellitus with diabetic peripheral angiopathy without gangrene, with long-term current use of insulin (HCC)    Lab Results   Component Value Date    HGBA1C 6.2 (H) 10/14/2024       Orders:    lidocaine (XYLOCAINE) 4 % topical solution 5 mL    Wound cleansing and dressings; Future    Wound Procedure Treatment        History of Present Illness   Chief Complaint   Patient presents with    Follow Up Wound Care Visit     Left foot, right toe   Patient presents for follow-up of a Sumner 3 DFU of the  left heel, and a Sumner 1 DFU of the right great toe.  No increased pain or drainage.  Patient had the initial application of Aurix last week as part of a trial.  The Tegaderm overlay slipped.  Patient continues to undergo HBO treatments.      Here for wound follow up.    Objective   /83   Pulse 64   Temp 97.6 °F (36.4 °C)   Resp 15     Physical Exam  Wound 08/15/24 Diabetic Ulcer Heel Left (Active)   Enter Sumner score: Sumner Grade 3: Deep abscess, OM, or joint sepsis 11/26/24 1030   Wound Image   11/26/24 1136   Wound Description Pink;Yellow;White;Slough 11/26/24 1030   Clara-wound Assessment Callus 11/26/24 1030   Wound Length (cm) 0.9 cm 11/26/24 1030   Wound Width (cm) 2 cm 11/26/24 1030   Wound Depth (cm) 0.1 cm 11/26/24 1030   Wound Surface Area (cm^2) 1.8 cm^2 11/26/24 1030   Wound Volume (cm^3) 0.18 cm^3 11/26/24 1030   Calculated Wound Volume (cm^3) 0.18 cm^3 11/26/24 1030   Change in Wound Size % 0 11/26/24 1030   Number of underminings 1 11/15/24 1110   Undermining 1 0.1 11/26/24 1030   Undermining 1 is depth extending from 1-2o'clock 11/26/24 1030   Drainage Amount Moderate 11/26/24 1030   Drainage Description Serosanguineous;Yellow 11/26/24 1030   Non-staged Wound Description Full thickness 11/26/24 1030   Dressing Status Intact 11/26/24 1030       Wound 11/08/24 Diabetic Ulcer Toe D1, great Right;Posterior (Active)   Enter Sumner score: Sumner Grade 1: Partial or full-thickness ulcer (superficial) 11/26/24 1033   Wound Image   11/26/24 1136   Wound Description Dry;Beefy red;Brown 11/26/24 1033   Clara-wound Assessment Dry 11/26/24 1033   Wound Length (cm) 1.4 cm 11/26/24 1033   Wound Width (cm) 0.7 cm 11/26/24 1033   Wound Depth (cm) 0.1 cm 11/26/24 1033   Wound Surface Area (cm^2) 0.98 cm^2 11/26/24 1033   Wound Volume (cm^3) 0.098 cm^3 11/26/24 1033   Calculated Wound Volume (cm^3) 0.1 cm^3 11/26/24 1033   Change in Wound Size % -66.67 11/26/24 1033   Drainage Amount None 11/26/24 1033  "  Drainage Description Sanguineous;Serosanguineous 11/15/24 1111   Non-staged Wound Description Full thickness 11/26/24 1033   Dressing Status Intact 11/26/24 1033       Debridement   Wound 08/15/24 Diabetic Ulcer Heel Left    Universal Protocol:  procedure performed by consultantConsent: Verbal consent obtained.  Consent given by: patient  Time out: Immediately prior to procedure a \"time out\" was called to verify the correct patient, procedure, equipment, support staff and site/side marked as required.  Timeout called at: 11/26/2024 10:50 AM.  Patient understanding: patient states understanding of the procedure being performed  Patient identity confirmed: verbally with patient    Debridement Details  Performed by: physician  Debridement type: surgical  Level of debridement: subcutaneous tissue  Pain control: lidocaine 4%      Post-debridement measurements  Length (cm): 0.9  Width (cm): 2  Depth (cm): 0.2  Percent debrided: 100%  Surface Area (cm^2): 1.8  Area Debrided (cm^2): 1.8  Volume (cm^3): 0.36    Tissue and other material debrided: subcutaneous tissue  Devitalized tissue debrided: exudate and slough  Instrument(s) utilized: curette  Bleeding: small  Hemostasis obtained with: pressure  Response to treatment: procedure was tolerated well    Debridement   Wound 11/08/24 Diabetic Ulcer Toe D1, great Right;Posterior    Universal Protocol:  procedure performed by consultantConsent: Verbal consent obtained.  Consent given by: patient  Time out: Immediately prior to procedure a \"time out\" was called to verify the correct patient, procedure, equipment, support staff and site/side marked as required.  Timeout called at: 11/26/2024 10:50 AM.  Patient understanding: patient states understanding of the procedure being performed  Patient identity confirmed: verbally with patient    Debridement Details  Performed by: physician  Debridement type: surgical  Level of debridement: subcutaneous tissue  Pain control: lidocaine " 4%      Post-debridement measurements  Length (cm): 1.4  Width (cm): 0.7  Depth (cm): 0.2  Percent debrided: 100%  Surface Area (cm^2): 0.98  Area Debrided (cm^2): 0.98  Volume (cm^3): 0.2    Tissue and other material debrided: subcutaneous tissue  Devitalized tissue debrided: exudate and slough  Instrument(s) utilized: curette  Bleeding: small  Hemostasis obtained with: pressure  Response to treatment: procedure was tolerated well       Results from last 6 Months   Lab Units 08/08/24  1344   WOUND CULTURE  4+ Growth of Klebsiella pneumoniae*  3+ Growth of Citrobacter koseri*  2+ Growth of Pseudomonas aeruginosa*  2+ Growth of     The procedure today is the application of Aurix gel to a diabetic foot ulcer  in a diabetic patient. This is the second application of the Aurix gel. The treatment with Aurix gel is reasonable and necessary because this chronic wound has failed to show evidence of a healing response to conservative care. Aurix gel is used in conjunction with standard of care procedures such as debridement of necrotic tissue, moist wound care, advanced dressings and management of underlying medical conditions. Underlying medical conditions and contributing factors for chronic wound including off-loading are adequately managed.   Prior therapy includes nutritional optimization and wound bed management with debridement of necrotic tissue. The Aurix System is cleared by the FDA for the management of chronic wounds and was selected because of its unique ability to utilize the patients own blood cells to produce a matrix composed of the patient's own fibrin and growth factors providing a moist wound environment to supporting angiogenesis and new tissue deposition.  The goal of treatment with Aurix gel for this patient is increase the amount of granulation tissue and deep overall area and volume of the wound.  The patient's medical history was reviewed to rule out anemia and determine Hemoglobin A1C status.  There is no evidence of infection, osteomyelitis, cancer or cellulitis. Oxygenation and circulation status was assessed and is adequate to support new tissue growth as determined by FABIOLA . Informed consent was obtained after explaining the risk and benefits of the Aurix procedure to the patient.   Wound Bed Preparation: The wound bed was prepped in the standard fashion with curette remove any slough.    Aurix Preparation and Application: The blood was drawn from the left arm using the phlebotomy section of the Aurix System according to the 's instructions for use. A total of (4) monovette tubes were drawn. The tubes were placed in the Aurix centrifuge and were spun according to the 's instructions for use. The reagent kit was prepared by reconstituting thrombin powder with 5ml of calcium chloride 10% solution. After centrifugation the PRP was removed from the tubes utilizing sterile technique. The patient was positioned appropriately, and the skin was prepared with a skin prep included in the Aurix kit. The amount of PRP removed was noted and used to calculate the amount of calcified thrombin and ascorbic acid to add to the mixing chamber.  8 mL of PRP was recovered and ascorbic acid (500mg/ml) and calcified thrombin (1,000 units/ml) were sequentially added to the PRP sample at in a ratio of 8:1:1 and gently mixed produce approximately 10 mL of Aurix gel. The Aurix gel was applied directly to the ulcer through blunt tipped cannula.  Fenestrated Tegaderm was applied over the wound. The patient tolerated the procedure well. The patient was educated to recognize signs and symptoms of infection such as edema, swelling, increased pain, and purulent drainage and if present to alert healthcare personnel. The patient was scheduled to return 1 week from today for assessment and possible reapplication.    Administrative Statements   I have spent a total time of 20 minutes in caring for this patient on  the day of the visit/encounter including Risks and benefits of tx options, Instructions for management, Importance of tx compliance, Risk factor reductions, and Documenting in the medical record.

## 2024-11-26 NOTE — ASSESSMENT & PLAN NOTE
Orders:    lidocaine (XYLOCAINE) 4 % topical solution 5 mL    Wound cleansing and dressings; Future    Wound Procedure Treatment

## 2024-11-27 ENCOUNTER — OFFICE VISIT (OUTPATIENT)
Dept: WOUND CARE | Facility: HOSPITAL | Age: 79
End: 2024-11-27
Payer: COMMERCIAL

## 2024-11-27 VITALS
TEMPERATURE: 96.8 F | SYSTOLIC BLOOD PRESSURE: 154 MMHG | HEART RATE: 60 BPM | DIASTOLIC BLOOD PRESSURE: 75 MMHG | RESPIRATION RATE: 18 BRPM

## 2024-11-27 DIAGNOSIS — L97.525 DIABETIC ULCER OF LEFT FOOT ASSOCIATED WITH TYPE 2 DIABETES MELLITUS, WITH MUSCLE INVOLVEMENT WITHOUT EVIDENCE OF NECROSIS, UNSPECIFIED PART OF FOOT (HCC): ICD-10-CM

## 2024-11-27 DIAGNOSIS — E11.621 DIABETIC ULCER OF LEFT FOOT ASSOCIATED WITH TYPE 2 DIABETES MELLITUS, WITH MUSCLE INVOLVEMENT WITHOUT EVIDENCE OF NECROSIS, UNSPECIFIED PART OF FOOT (HCC): ICD-10-CM

## 2024-11-27 DIAGNOSIS — I50.9 CHF (CONGESTIVE HEART FAILURE) (HCC): ICD-10-CM

## 2024-11-27 LAB
GLUCOSE SERPL-MCNC: 199 MG/DL (ref 65–140)
GLUCOSE SERPL-MCNC: 218 MG/DL (ref 65–140)

## 2024-11-27 PROCEDURE — G0277 HBOT, FULL BODY CHAMBER, 30M: HCPCS | Performed by: FAMILY MEDICINE

## 2024-11-27 PROCEDURE — 99183 HYPERBARIC OXYGEN THERAPY: CPT | Performed by: FAMILY MEDICINE

## 2024-11-27 PROCEDURE — 82948 REAGENT STRIP/BLOOD GLUCOSE: CPT | Performed by: FAMILY MEDICINE

## 2024-11-27 RX ORDER — TORSEMIDE 20 MG/1
20 TABLET ORAL DAILY
Qty: 90 TABLET | Refills: 1 | Status: SHIPPED | OUTPATIENT
Start: 2024-11-27

## 2024-11-27 NOTE — PROGRESS NOTES
HBO Treatment Course Details       Treatment Notes: Patient tolerated HBOT well No complaints.  Dressings intact.     Treatment Course Number: 21  Total Treatments Ordered:  40     Diagnosis:   1. Diabetic ulcer of left foot associated with type 2 diabetes mellitus, with muscle involvement without evidence of necrosis, unspecified part of foot (HCC)  [facility]    [facility]          HBO Treatment Details:  In-Patient Visit: no  Treatment Length:90 Minutes(Minutes)  Chamber #: Hard sided Monoplace Chamber    Pre-Treatment details:  Pre-treatment protocol Treatment Protocol: 2.5 INDY X 90 minutes w/ 100% oxygen, two 5 minute air breaks  Left ear clear?: yes  Right ear clear?: yes  Left ear intact?: yes  Right ear intact?: yes        PE Tubes present, Left ear?: yes  PE Tubes present, Right ear?: yes  Left ear irrigated?: no  Right ear irrigated?: no  Left ear TEED scale: Grade 0  Right ear TEED scale: Grade 0   Pretreatment heart and lung assessment: Pretreatment heart and lung auscltation unremarkable. Patient cleared for HBOT (murmur present)     Treatment details:  INDY Rate: 2.5  Started Compression: 0820  Reached Compression: 0830  Total Compression Time: 10 (Minutes)  Total Holding Time: 100 (Minutes)  Started Decompression: 1010  Reached Surface: 1020  Total Decompression Time: 10 (Minutes)  Total Airbreaks:   (Minutes)  Total Time of Treatment: 120 (Minutes)  Symptoms Noted During Treatment: None (Minutes)    Post treatment details:  Left ear clear?: yes  Right ear clear?: yes  Left ears intact?: yes  Right ears intact?: yes                    Left ear TEED scale: Grade 0  Right ear TEED scale: Grade 0  Post treatment heart and lung assessment: Post treatment heart and lung auscltation unremarkable. Patient cleared for discharge. Tolerated treatment well.             Vital Signs:  HBO Glucose Reference Range: 100-350 mg/dl   Pre-Treatment Post-Treatment   Time vitals are taken:  0810 Time vitals are taken: 1021   Blood Pressure: 164/89 Blood Pressure: 134/66   Pulse: 84 Pulse: 82   Resp: 18 Resp: 16   Temp: 96.9 °F (36.1 °C) Temp: 96.9 °F (36.1 °C)   Pre-Inspection Glucose readin Post-Inspection Glucose readin       Allergies   Allergen Reactions    Lisinopril Rash and Lip Swelling     Patient Active Problem List    Diagnosis Date Noted    Foot ulcer (HCC) 2024    SOB (shortness of breath) 2024    Nausea 2024    Elevated troponin 2024    History of DVT (deep vein thrombosis) 2024    Diabetic ulcer of toe of left foot associated with type 2 diabetes mellitus, limited to breakdown of skin ([facility]) 2024    Plantar fasciitis, right 07/10/2024    Acute deep vein thrombosis (DVT) of left peroneal vein ([facility]) 2024    Iron deficiency anemia 2024    Shortness of breath 2024    History of colon polyps 2024    Duodenal ulcer 2024    Multiple gastric ulcers 2023    Iron deficiency anemia due to chronic blood loss 2023    Melena 2023    Symptomatic anemia 2023    Frequent PVCs 2023    Acute on chronic diastolic heart failure ([facility]) 2023    Stage 3b chronic kidney disease ([facility]) 2023    Diabetic ulcer of right midfoot associated with diabetes mellitus due to underlying condition, limited to breakdown of skin ([facility]) 06/15/2023    Hypertensive urgency 2023    Elevated troponin level not due myocardial infarction 2023    Stable angina pectoris ([facility]) 2023    Chronic kidney disease-mineral and bone disorder 2022    Mild aortic stenosis 2022    Chronic HFpEF/Moderate pHTN (HFpEF) ([facility]) 11/10/2022    Gross hematuria 2022    Platelets decreased ([facility]) 2022    Acute kidney injury superimposed on chronic kidney disease  ([facility]) 2022    Actinic keratoses 2022    Type 2 diabetes mellitus with diabetic peripheral angiopathy without gangrene, with long-term  current use of insulin ([facility]) 01/11/2022    Varicose veins of left lower extremity 06/25/2021    History of endovascular stent graft for abdominal aortic aneurysm (AAA) 06/25/2021    Bruit (arterial) 06/25/2021    Peripheral artery disease (HCC) 06/25/2021    Type 2 diabetes mellitus with diabetic polyneuropathy, with long-term current use of insulin ([facility]) 06/10/2021    Mixed hyperlipidemia 05/11/2021    Primary hypertension 05/11/2021    Coronary artery disease involving native coronary artery of native heart without angina pectoris 05/11/2021    S/P angioplasty with stent 05/11/2021    Hx of CABG 05/11/2021    S/P AAA repair 05/11/2021    S/P prostatectomy 05/11/2021    H/O prostate cancer 05/11/2021     No orders of the defined types were placed in this encounter.

## 2024-12-02 ENCOUNTER — OFFICE VISIT (OUTPATIENT)
Dept: WOUND CARE | Facility: HOSPITAL | Age: 79
End: 2024-12-02
Payer: COMMERCIAL

## 2024-12-02 VITALS
SYSTOLIC BLOOD PRESSURE: 116 MMHG | RESPIRATION RATE: 18 BRPM | DIASTOLIC BLOOD PRESSURE: 66 MMHG | HEART RATE: 65 BPM | TEMPERATURE: 98.8 F

## 2024-12-02 DIAGNOSIS — L97.525 DIABETIC ULCER OF LEFT FOOT ASSOCIATED WITH TYPE 2 DIABETES MELLITUS, WITH MUSCLE INVOLVEMENT WITHOUT EVIDENCE OF NECROSIS, UNSPECIFIED PART OF FOOT (HCC): ICD-10-CM

## 2024-12-02 DIAGNOSIS — E11.621 DIABETIC ULCER OF LEFT FOOT ASSOCIATED WITH TYPE 2 DIABETES MELLITUS, WITH MUSCLE INVOLVEMENT WITHOUT EVIDENCE OF NECROSIS, UNSPECIFIED PART OF FOOT (HCC): ICD-10-CM

## 2024-12-02 LAB
GLUCOSE SERPL-MCNC: 148 MG/DL (ref 65–140)
GLUCOSE SERPL-MCNC: 174 MG/DL (ref 65–140)

## 2024-12-02 PROCEDURE — G0277 HBOT, FULL BODY CHAMBER, 30M: HCPCS | Performed by: FAMILY MEDICINE

## 2024-12-02 PROCEDURE — 99183 HYPERBARIC OXYGEN THERAPY: CPT | Performed by: FAMILY MEDICINE

## 2024-12-02 PROCEDURE — 82948 REAGENT STRIP/BLOOD GLUCOSE: CPT | Performed by: FAMILY MEDICINE

## 2024-12-02 NOTE — PROGRESS NOTES
HBO Treatment Course Details       Treatment Notes: Treatment tolerated well.  No complaints of pain or discomfort     Treatment Course Number: 22  Total Treatments Ordered:  40     Diagnosis:   1. Diabetic ulcer of left foot associated with type 2 diabetes mellitus, with muscle involvement without evidence of necrosis, unspecified part of foot (HCC)  [facility]          H[facility] Treatment Details:  In-Patient Visit: no  Treatment Length:90 Minutes(Minutes)  Chamber #: Hard sided Monoplace Chamber    Pre-Treatment details:  Pre-treatment protocol Treatment Protocol: 2.5 INDY X 90 minutes w/ 100% oxygen, two 5 minute air breaks  Left ear clear?: yes  Right ear clear?: yes  Left ear intact?: yes  Right ear intact?: yes        PE Tubes present, Left ear?: yes  PE Tubes present, Right ear?: yes  Left ear irrigated?: no  Right ear irrigated?: no     Right ear TEED scale: Grade 0   Pretreatment heart and lung assessment: Pretreatment heart and lung auscltation unremarkable. Patient cleared for HBOT     Treatment details:  INDY Rate: 2.5  Started Compression: 0818  Reached Compression: 0828  Total Compression Time: 10 (Minutes)  Total Holding Time: 100 (Minutes)  Started Decompression: 1008  Reached Surface: 1018  Total Decompression Time: 10 (Minutes)  Total Airbreaks:   (Minutes)  Total Time of Treatment: 120 (Minutes)  Symptoms Noted During Treatment: None (Minutes)    Post treatment details:  Left ear clear?: yes  Right ear clear?: yes  Left ears intact?: yes  Right ears intact?: yes                    Left ear TEED scale: Grade 0  Right ear TEED scale: Grade 0  Post treatment heart and lung assessment: Post treatment heart and lung auscltation unremarkable. Patient cleared for discharge. Tolerated treatment well.             Vital Signs:  HBO Glucose Reference Range: 100-350 mg/dl   Pre-Treatment Post-Treatment   Time vitals are taken: 0800 Time vitals are taken: 1015   Blood Pressure: 116/66 Blood Pressure:  138/77   Pulse: 65 Pulse: 58   Resp: 18 Resp: 18   Temp: 98.8 °F (37.1 °C) Temp: 96.3 °F (35.7 °C)   Pre-Inspection Glucose readin Post-Inspection Glucose readin       Allergies   Allergen Reactions    Lisinopril Rash and Lip Swelling     Patient Active Problem List    Diagnosis Date Noted    Foot ulcer (HCC) 2024    SOB (shortness of breath) 2024    Nausea 2024    Elevated troponin 2024    History of DVT (deep vein thrombosis) 2024    Diabetic ulcer of toe of left foot associated with type 2 diabetes mellitus, limited to breakdown of skin (HCC) 2024    Plantar fasciitis, right 07/10/2024    Acute deep vein thrombosis (DVT) of left peroneal vein ([facility]) 2024    Iron deficiency anemia 2024    Shortness of breath 2024    History of colon polyps 2024    Duodenal ulcer 2024    Multiple gastric ulcers 2023    Iron deficiency anemia due to chronic blood loss 2023    Melena 2023    Symptomatic anemia 2023    Frequent PVCs 2023    Acute on chronic diastolic heart failure (HCC) 2023    Stage 3b chronic kidney disease ([facility]) 2023    Diabetic ulcer of right midfoot associated with diabetes mellitus due to underlying condition, limited to breakdown of skin ([facility]) 06/15/2023    Hypertensive urgency 2023    Elevated troponin level not due myocardial infarction 2023    Stable angina pectoris ([facility]) 2023    Chronic kidney disease-mineral and bone disorder 2022    Mild aortic stenosis 2022    Chronic HFpEF/Moderate pHTN (HFpEF) ([facility]) 11/10/2022    Gross hematuria 2022    Platelets decreased ([facility]) 2022    Acute kidney injury superimposed on chronic kidney disease  ([facility]) 2022    Actinic keratoses 2022    Type 2 diabetes mellitus with diabetic peripheral angiopathy without gangrene, with long-term current use of insulin ([facility]) 2022    Varicose veins of left lower  extremity 06/25/2021    History of endovascular stent graft for abdominal aortic aneurysm (AAA) 06/25/2021    Bruit (arterial) 06/25/2021    Peripheral artery disease (HCC) 06/25/2021    Type 2 diabetes mellitus with diabetic polyneuropathy, with long-term current use of insulin (HCC) 06/10/2021    Mixed hyperlipidemia 05/11/2021    Primary hypertension 05/11/2021    Coronary artery disease involving native coronary artery of native heart without angina pectoris 05/11/2021    S/P angioplasty with stent 05/11/2021    Hx of CABG 05/11/2021    S/P AAA repair 05/11/2021    S/P prostatectomy 05/11/2021    H/O prostate cancer 05/11/2021     No orders of the defined types were placed in this encounter.

## 2024-12-03 ENCOUNTER — NURSE TRIAGE (OUTPATIENT)
Age: 79
End: 2024-12-03

## 2024-12-03 ENCOUNTER — OFFICE VISIT (OUTPATIENT)
Dept: WOUND CARE | Facility: HOSPITAL | Age: 79
End: 2024-12-03
Payer: COMMERCIAL

## 2024-12-03 ENCOUNTER — TELEPHONE (OUTPATIENT)
Age: 79
End: 2024-12-03

## 2024-12-03 VITALS — TEMPERATURE: 97.1 F | DIASTOLIC BLOOD PRESSURE: 68 MMHG | SYSTOLIC BLOOD PRESSURE: 130 MMHG | HEART RATE: 40 BPM

## 2024-12-03 VITALS
SYSTOLIC BLOOD PRESSURE: 145 MMHG | TEMPERATURE: 96.6 F | HEART RATE: 63 BPM | RESPIRATION RATE: 18 BRPM | DIASTOLIC BLOOD PRESSURE: 62 MMHG

## 2024-12-03 DIAGNOSIS — I73.9 PERIPHERAL ARTERY DISEASE (HCC): ICD-10-CM

## 2024-12-03 DIAGNOSIS — E11.51 TYPE 2 DIABETES MELLITUS WITH DIABETIC PERIPHERAL ANGIOPATHY WITHOUT GANGRENE, WITH LONG-TERM CURRENT USE OF INSULIN (HCC): ICD-10-CM

## 2024-12-03 DIAGNOSIS — E11.621 DIABETIC ULCER OF TOE OF RIGHT FOOT ASSOCIATED WITH TYPE 2 DIABETES MELLITUS, LIMITED TO BREAKDOWN OF SKIN (HCC): ICD-10-CM

## 2024-12-03 DIAGNOSIS — E11.621 DIABETIC ULCER OF LEFT FOOT ASSOCIATED WITH TYPE 2 DIABETES MELLITUS, WITH MUSCLE INVOLVEMENT WITHOUT EVIDENCE OF NECROSIS, UNSPECIFIED PART OF FOOT (HCC): Primary | ICD-10-CM

## 2024-12-03 DIAGNOSIS — L97.525 DIABETIC ULCER OF LEFT FOOT ASSOCIATED WITH TYPE 2 DIABETES MELLITUS, WITH MUSCLE INVOLVEMENT WITHOUT EVIDENCE OF NECROSIS, UNSPECIFIED PART OF FOOT (HCC): ICD-10-CM

## 2024-12-03 DIAGNOSIS — Z79.4 TYPE 2 DIABETES MELLITUS WITH DIABETIC PERIPHERAL ANGIOPATHY WITHOUT GANGRENE, WITH LONG-TERM CURRENT USE OF INSULIN (HCC): ICD-10-CM

## 2024-12-03 DIAGNOSIS — L97.511 DIABETIC ULCER OF TOE OF RIGHT FOOT ASSOCIATED WITH TYPE 2 DIABETES MELLITUS, LIMITED TO BREAKDOWN OF SKIN (HCC): ICD-10-CM

## 2024-12-03 DIAGNOSIS — L97.525 DIABETIC ULCER OF LEFT FOOT ASSOCIATED WITH TYPE 2 DIABETES MELLITUS, WITH MUSCLE INVOLVEMENT WITHOUT EVIDENCE OF NECROSIS, UNSPECIFIED PART OF FOOT (HCC): Primary | ICD-10-CM

## 2024-12-03 DIAGNOSIS — E11.621 DIABETIC ULCER OF LEFT FOOT ASSOCIATED WITH TYPE 2 DIABETES MELLITUS, WITH MUSCLE INVOLVEMENT WITHOUT EVIDENCE OF NECROSIS, UNSPECIFIED PART OF FOOT (HCC): ICD-10-CM

## 2024-12-03 LAB
GLUCOSE SERPL-MCNC: 178 MG/DL (ref 65–140)
GLUCOSE SERPL-MCNC: 209 MG/DL (ref 65–140)

## 2024-12-03 PROCEDURE — 11042 DBRDMT SUBQ TIS 1ST 20SQCM/<: CPT | Performed by: FAMILY MEDICINE

## 2024-12-03 PROCEDURE — 99214 OFFICE O/P EST MOD 30 MIN: CPT | Performed by: FAMILY MEDICINE

## 2024-12-03 PROCEDURE — 99183 HYPERBARIC OXYGEN THERAPY: CPT | Performed by: FAMILY MEDICINE

## 2024-12-03 PROCEDURE — G0277 HBOT, FULL BODY CHAMBER, 30M: HCPCS | Performed by: FAMILY MEDICINE

## 2024-12-03 PROCEDURE — 82948 REAGENT STRIP/BLOOD GLUCOSE: CPT | Performed by: FAMILY MEDICINE

## 2024-12-03 RX ORDER — LIDOCAINE 40 MG/G
CREAM TOPICAL ONCE
Status: COMPLETED | OUTPATIENT
Start: 2024-12-03 | End: 2024-12-03

## 2024-12-03 RX ADMIN — LIDOCAINE: 40 CREAM TOPICAL at 11:14

## 2024-12-03 NOTE — PROGRESS NOTES
Name: Thomas Horne      : 1945      MRN: [id number]  Encounter Provider: Nitza Sotelo DO  Encounter Date: 12/3/2024   Encounter department: [facility] WOUND [facility]  :  Assessment & Plan  Diabetic ulcer of left foot associated with type 2 diabetes mellitus, with muscle involvement without evidence of necrosis, unspecified part of foot (HCC)  Wound is improved but periwound still appears somewhat macerated  Debrided as below  Wound management with silver alginate, see wound orders below  Proper offloading and tight diabetic control  Continue hyperbaric treatments  Follow-up in 1 week or call sooner with questions or concerns  Lab Results   Component Value Date    HGBA1C 6.2 (H) 10/14/2024       Orders:    lidocaine (LMX) 4 % cream    Wound cleansing and dressings; Future    Wound Procedure Treatment    Debridement    Diabetic ulcer of toe of right foot associated with type 2 diabetes mellitus, limited to breakdown of skin (HCC)  1 week post second application of Aurix  Wound is slightly larger  Tegaderm had shifted again  Wound management with silver alginate, see wound orders below  Stat referral to vascular placed today.  Patient had intervention on the left side in August but arterial studies from 2024 show a right FABIOLA of 0.48 and right great toe pressure of 20.  Discussed with patient that he may not have adequate arterial flow to heal this wound.    Continue HBO treatments  Proper offloading and tight diabetic control  Follow-up in 1 week or call sooner with questions or concerns  Lab Results   Component Value Date    HGBA1C 6.2 (H) 10/14/2024       Orders:    lidocaine (LMX) 4 % cream    Wound cleansing and dressings; Future    Wound Procedure Treatment    Ambulatory Referral to Vascular Surgery; Future    Debridement    Peripheral artery disease (HCC)    Orders:    lidocaine (LMX) 4 % cream    Wound cleansing and dressings; Future    Wound Procedure Treatment    Type 2  diabetes mellitus with diabetic peripheral angiopathy without gangrene, with long-term current use of insulin ([facility])    Lab Results   Component Value Date    HGBA1C 6.2 (H) 10/14/2024       Orders:    lidocaine (LMX) 4 % cream    Wound cleansing and dressings; Future    Wound Procedure Treatment        History of Present Illness   Chief Complaint   Patient presents with    Follow Up Wound Care Visit     Right toe, Left heel   Patient presents for follow-up of Sumner 3 DFU of the left heel and a Sumner 1 DFU of the right great toe.  Patient had second application of Aurix done last week.  Nurse noted that the Tegaderm had shifted again on the right great toe wound.  Patient is currently undergoing HBO treatments, completed treatment #23 today.      Here for wound follow up.    Objective   /68 (BP Location: Right arm, Patient Position: Sitting)   Pulse (!) 40   Temp (!) 97.1 °F (36.2 °C)     Physical Exam  Wound 08/15/24 Diabetic Ulcer Heel Left (Active)   Enter Sumner score: Sumner Grade 3: Deep abscess, OM, or joint sepsis 12/03/24 1111   Wound Image   12/03/24 1139   Wound Description Pale;Pink;Yellow 12/03/24 1111   Clara-wound Assessment Callus;Maceration 12/03/24 1111   Wound Length (cm) 0.7 cm 12/03/24 1111   Wound Width (cm) 2.2 cm 12/03/24 1111   Wound Depth (cm) 0.1 cm 12/03/24 1111   Wound Surface Area (cm^2) 1.54 cm^2 12/03/24 1111   Wound Volume (cm^3) 0.154 cm^3 12/03/24 1111   Calculated Wound Volume (cm^3) 0.15 cm^3 12/03/24 1111   Change in Wound Size % 16.67 12/03/24 1111   Number of underminings 1 11/15/24 1110   Undermining 1 0.1 11/26/24 1030   Undermining 1 is depth extending from 1-2o'clock 11/26/24 1030   Drainage Amount Moderate 12/03/24 1111   Drainage Description Serosanguineous;Yellow 12/03/24 1111   Non-staged Wound Description Full thickness 12/03/24 1111   Dressing Status Intact 12/03/24 1111       Wound 11/08/24 Diabetic Ulcer Toe D1, great Right;Posterior (Active)   Enter Sumner  "score: Sumner Grade 1: Partial or full-thickness ulcer (superficial) 12/03/24 1109   Wound Image   12/03/24 1139   Wound Description Pink;White;Other (Comment) 12/03/24 1109   Clara-wound Assessment Maceration;White 12/03/24 1109   Wound Length (cm) 1.3 cm 12/03/24 1109   Wound Width (cm) 1 cm 12/03/24 1109   Wound Depth (cm) 0.1 cm 12/03/24 1109   Wound Surface Area (cm^2) 1.3 cm^2 12/03/24 1109   Wound Volume (cm^3) 0.13 cm^3 12/03/24 1109   Calculated Wound Volume (cm^3) 0.13 cm^3 12/03/24 1109   Change in Wound Size % -116.67 12/03/24 1109   Drainage Amount Small 12/03/24 1109   Drainage Description Serosanguineous 12/03/24 1109   Non-staged Wound Description Full thickness 12/03/24 1109   Dressing Status Intact 12/03/24 1109       Debridement   Wound 08/15/24 Diabetic Ulcer Heel Left    Universal Protocol:  procedure performed by consultantConsent: Verbal consent obtained.  Consent given by: patient  Time out: Immediately prior to procedure a \"time out\" was called to verify the correct patient, procedure, equipment, support staff and site/side marked as required.  Timeout called at: 12/3/2024 11:10 AM.  Patient understanding: patient states understanding of the procedure being performed  Patient identity confirmed: verbally with patient    Debridement Details  Performed by: physician  Debridement type: surgical  Level of debridement: subcutaneous tissue  Pain control: lidocaine 4%      Post-debridement measurements  Length (cm): 0.7  Width (cm): 2.2  Depth (cm): 0.2  Percent debrided: 100%  Surface Area (cm^2): 1.54  Area Debrided (cm^2): 1.54  Volume (cm^3): 0.31    Tissue and other material debrided: subcutaneous tissue  Devitalized tissue debrided: exudate and slough  Instrument(s) utilized: curette  Bleeding: small  Hemostasis obtained with: pressure  Response to treatment: procedure was tolerated well    Debridement   Wound 11/08/24 Diabetic Ulcer Toe D1, great Right;Posterior    Universal " "Protocol:  procedure performed by consultantConsent: Verbal consent obtained.  Consent given by: patient  Time out: Immediately prior to procedure a \"time out\" was called to verify the correct patient, procedure, equipment, support staff and site/side marked as required.  Timeout called at: 12/3/2024 11:10 AM.  Patient understanding: patient states understanding of the procedure being performed  Patient identity confirmed: verbally with patient    Debridement Details  Performed by: physician  Debridement type: surgical  Level of debridement: subcutaneous tissue  Pain control: lidocaine 4%      Post-debridement measurements  Length (cm): 1.3  Width (cm): 1  Depth (cm): 0.2  Percent debrided: 100%  Surface Area (cm^2): 1.3  Area Debrided (cm^2): 1.3  Volume (cm^3): 0.26    Tissue and other material debrided: subcutaneous tissue  Devitalized tissue debrided: exudate and slough  Instrument(s) utilized: curette  Bleeding: small  Hemostasis obtained with: pressure  Response to treatment: procedure was tolerated well       Results from last 6 Months   Lab Units 08/08/24  1344   WOUND CULTURE  4+ Growth of Klebsiella pneumoniae*  3+ Growth of Citrobacter koseri*  2+ Growth of Pseudomonas aeruginosa*  2+ Growth of       Administrative Statements   I have spent a total time of 20 minutes in caring for this patient on the day of the visit/encounter including Risks and benefits of tx options, Instructions for management, Patient and family education, Importance of tx compliance, Risk factor reductions, Documenting in the medical record, Reviewing / ordering tests, medicine, procedures  , and Obtaining or reviewing history  .   "

## 2024-12-03 NOTE — PROGRESS NOTES
Pt's heart rate at wound care visit 38-40 when palpated and auscultated for one minute. Pt denies dizziness, vision change, chest pain/sob.  Pt able to stand after appt and continued to deny any symptoms.  Dr. Sotelo instructed pt to call his cardiologist as soon as he gets home because his cardiologist might want to adjust medication; pt also instructed to go to Emergency Dept if he develops any of the above symptoms or just doesn't feel well  These instructions included in pt's printed instructions. Pt verbalized understanding of instructions.  Pt ambulated without any difficulty.

## 2024-12-03 NOTE — ASSESSMENT & PLAN NOTE
Lab Results   Component Value Date    HGBA1C 6.2 (H) 10/14/2024       Orders:    lidocaine (LMX) 4 % cream    Wound cleansing and dressings; Future    Wound Procedure Treatment

## 2024-12-03 NOTE — TELEPHONE ENCOUNTER
This patient is already a patient of ours and last saw Dr. Clark in the office 10/3. Given that there is progression of wound, recommend obtaining updated LEAD and have him seen in the office in 1-2 weeks. If he develops signs of infection or there is concern for OM, he should go to the ER.

## 2024-12-03 NOTE — TELEPHONE ENCOUNTER
"Pt calling to report that his HR was found to be 38-39 @ his hyperbaric treatment today. He takes Metoprolol 25mg BID. His heart rate has been taken daily at his treatments and has been noticed to be decreasing slowly from the 60's to now 38-39. Pt reports mild symptoms, of SOB and slowing and lightheadedness at times, but generally no change. Denies dizziness, chest pain and vision changes.     Pt has his vital signs taken daily with his hyperbaric treatments and was instructed to hold this evening's dose until he hears back from Dr. Pereira's office with further recommendations.     Advised patient would inform provider of symotoms and call patient back with further recommendations.         Answer Assessment - Initial Assessment Questions  1. REASON FOR CALL: \"What is your main concern right now?\"      Bradycardia   2. ONSET: \"When did the bradycardia start?\"      Gradual onset, but HR of 38-39 noted today @ hyperbaric treatment for woundcare     5. OTHER SYMPTOMS: \"Do you have any other new symptoms?\"      Slight SOB at times, some slowing down   6. TREATMENTS AND RESPONSE: \"What have you done so far to try to make this better? What medicines have you used?\"      Pt takes Metoprolol 25mg BID    Protocols used: No Protocol Available-Adult-[place]    "

## 2024-12-03 NOTE — PROGRESS NOTES
Wound Procedure Treatment    Performed by: Graciela Philip RN  Authorized by: Nitza Sotelo DO    Associated wounds:   Wound 08/15/24 Diabetic Ulcer Heel Left  Wound 11/08/24 Diabetic Ulcer Toe D1, great Right;Posterior  Wound cleansed with:  NSS  Applied to periwound:  Moisture lotion  Applied primary dressing:  Calcium alginate and Silver  Applied secondary dressing:  ABD and Gauze  Dressing secured with:  Ender and Tape

## 2024-12-03 NOTE — LETTER
[facility]  [address] [address]  Phone#  [phone]  Fax#  [phone]    Patient:  Thomas Horne  YOB: 1945  Phone:  [phone]  Date of Visit:  12/3/2024    Orders Placed This Encounter   Procedures   • Wound cleansing and dressings     LEFT HEEL WOUND :  Wash your hands with soap and water.  Remove old dressing, discard into plastic bag and place in trash.  Cleanse the wound with soap and water prior to applying a clean dressing. Do not use tissue or cotton balls. Do not scrub the wound. Pat dry using gauze.  Shower: NO  Apply moisturizer to skin surrounding wound  Apply silver alginate to the heel wound.  Cover with ABD  Secure with rolled gauze and tape.  Change dressing every other day.    RIGHT TOE WOUND:  Wash your hands with soap and water.  Remove old dressing, discard into plastic bag and place in trash.  Cleanse the wound with soap and water prior to applying a clean dressing. Do not use tissue or cotton balls. Do not scrub the wound. Pat dry using gauze.  Shower: NO  Apply moisturizer to skin surrounding wound  Apply silver alginate to the heel wound.  Cover with gauze  Secure with rolled gauze and tape.  Change dressing every other day.    CALL CARDIOLOGY AS SOON AS YOU GET HOME. LET THE CARDIOLOGIST KNOW THAT YOUR HEART RATE IS 38-40 AND THAT YOU CURRENTLY HAVE NO SYMPTOMS.  HOWEVER, IF YOU DEVELOP DIZZINESS, VISION CHANGES, CHEST PAIN, GO TO THE EMERGENCY DEPARTMENT.  YOUR CARDIOLOGIST MIGHT WANT TO CHANGE YOUR MEDICATIONS.    Off-loading Instructions:     Keep weight and pressure off wound at all times. and Wear off-loading OOFOS slides are ok as directed by your physician. Put on immediately when rising in the morning and remove when going to bed.      Wear off-loading device as directed by your physician (Globo Ped). Put on immediately when rising in the morning and remove when going to bed and Turn every 2 hours. Avoid  position directing pressure to Wound site. Limit side lying to 30 degree tilt. Limit the head of bed elevation to 30 degrees. Limit walking and standing to only necessity for activities of daily living.      Wound infection: If you have signs of infection please call the wound center. If the wound center is closedplease go to the Emergency department. Some signs of infection: fever, chills, increased redness, red streaks, increase in pain, increased drainage. Drainage with an odor, Change in drainage color: white/milky/green/tan/yellow, an increase in swelling, chest pain and/or shortness of breath.     Protein: Eat protein with each meal to promote healing. Examples of protein are fish, meat, chicken, nuts, peanut butter, eggs, lentils, edamame or a protein shake.     Standing Status:   Future     Expiration Date:   12/10/2024   • Wound Procedure Treatment     This order was created via procedure documentation         Electronically signed by Nitza Sotelo DO

## 2024-12-03 NOTE — TELEPHONE ENCOUNTER
He should come to our office for EKG and cut back on metoprolol to 12.5 twice a day.  Lets get an EKG done on him in our office.

## 2024-12-03 NOTE — ASSESSMENT & PLAN NOTE
Orders:    lidocaine (LMX) 4 % cream    Wound cleansing and dressings; Future    Wound Procedure Treatment

## 2024-12-03 NOTE — PATIENT INSTRUCTIONS
Orders Placed This Encounter   Procedures    Wound cleansing and dressings     LEFT HEEL WOUND :  Wash your hands with soap and water.  Remove old dressing, discard into plastic bag and place in trash.  Cleanse the wound with soap and water prior to applying a clean dressing. Do not use tissue or cotton balls. Do not scrub the wound. Pat dry using gauze.  Shower: NO  Apply moisturizer to skin surrounding wound  Apply silver alginate to the heel wound.  Cover with ABD  Secure with rolled gauze and tape.  Change dressing every other day.    RIGHT TOE WOUND:  Wash your hands with soap and water.  Remove old dressing, discard into plastic bag and place in trash.  Cleanse the wound with soap and water prior to applying a clean dressing. Do not use tissue or cotton balls. Do not scrub the wound. Pat dry using gauze.  Shower: NO  Apply moisturizer to skin surrounding wound  Apply silver alginate to the heel wound.  Cover with gauze  Secure with rolled gauze and tape.  Change dressing every other day.    CALL CARDIOLOGY AS SOON AS YOU GET HOME. LET THE CARDIOLOGIST KNOW THAT YOUR HEART RATE IS 38-40 AND THAT YOU CURRENTLY HAVE NO SYMPTOMS.  HOWEVER, IF YOU DEVELOP DIZZINESS, VISION CHANGES, CHEST PAIN, GO TO THE EMERGENCY DEPARTMENT.  YOUR CARDIOLOGIST MIGHT WANT TO CHANGE YOUR MEDICATIONS.    Off-loading Instructions:     Keep weight and pressure off wound at all times. and Wear off-loading OOFOS slides are ok as directed by your physician. Put on immediately when rising in the morning and remove when going to bed.      Wear off-loading device as directed by your physician (Globo Ped). Put on immediately when rising in the morning and remove when going to bed and Turn every 2 hours. Avoid position directing pressure to Wound site. Limit side lying to 30 degree tilt. Limit the head of bed elevation to 30 degrees. Limit walking and standing to only necessity for activities of daily living.      Wound infection: If you have  signs of infection please call the wound center. If the wound center is closedplease go to the Emergency department. Some signs of infection: fever, chills, increased redness, red streaks, increase in pain, increased drainage. Drainage with an odor, Change in drainage color: white/milky/green/tan/yellow, an increase in swelling, chest pain and/or shortness of breath.     Protein: Eat protein with each meal to promote healing. Examples of protein are fish, meat, chicken, nuts, peanut butter, eggs, lentils, edamame or a protein shake.     Standing Status:   Future     Expiration Date:   12/10/2024

## 2024-12-03 NOTE — TELEPHONE ENCOUNTER
Pt is being referred to us for:    CONSULT - Right great toe wound with poor great toe pressures. REF Deepak. l/s 10/3/24 CAP. RR NILS 12/27/24.    NOTE:    pt needs urgent appt within one week per DT. No available appts at this time in E[facility]/WAR/[facility].       #: [phone]

## 2024-12-03 NOTE — PROGRESS NOTES
HBO Treatment Course Details       Treatment Notes: Treatment tolerated well.  No complaints of pain or discomfort     Treatment Course Number: 23  Total Treatments Ordered:  40     Diagnosis:   1. Diabetic ulcer of left foot associated with type 2 diabetes mellitus, with muscle involvement without evidence of necrosis, unspecified part of foot (HCC)  [facility]          H[facility] Treatment Details:  In-Patient Visit: no  Treatment Length:90 Minutes(Minutes)  Chamber #: Hard sided Monoplace Chamber    Pre-Treatment details:  Pre-treatment protocol Treatment Protocol: 2.5 INDY X 90 minutes w/ 100% oxygen, two 5 minute air breaks  Left ear clear?: yes  Right ear clear?: yes  Left ear intact?: yes  Right ear intact?: yes        PE Tubes present, Left ear?: yes  PE Tubes present, Right ear?: yes  Left ear irrigated?: no  Right ear irrigated?: no  Left ear TEED scale: Grade 0  Right ear TEED scale: Grade 0   Pretreatment heart and lung assessment: Pretreatment heart and lung auscltation unremarkable. Patient cleared for HBOT     Treatment details:  INDY Rate: 2.5  Started Compression: 0826  Reached Compression: 0836  Total Compression Time: 10 (Minutes)  Total Holding Time: 100 (Minutes)  Started Decompression: 1016  Reached Surface: 1026  Total Decompression Time: 10 (Minutes)  Total Airbreaks:   (Minutes)  Total Time of Treatment: 120 (Minutes)  Symptoms Noted During Treatment: None (Minutes)    Post treatment details:  Left ear clear?: yes  Right ear clear?: yes  Left ears intact?: yes  Right ears intact?: yes                    Left ear TEED scale: Grade 0  Right ear TEED scale: Grade 0  Post treatment heart and lung assessment: Post treatment heart and lung auscltation unremarkable. Patient cleared for discharge. Tolerated treatment well.             Vital Signs:  HBO Glucose Reference Range: 100-350 mg/dl   Pre-Treatment Post-Treatment   Time vitals are taken: 0810 Time vitals are taken: 1030   Blood  Pressure: 145/62 Blood Pressure: 130/68   Pulse: 63 Pulse: 62   Resp: 18 Resp: 18   Temp: 96.6 °F (35.9 °C) Temp: 96.3 °F (35.7 °C)   Pre-Inspection Glucose readin Post-Inspection Glucose readin       Allergies   Allergen Reactions    Lisinopril Rash and Lip Swelling     Patient Active Problem List    Diagnosis Date Noted    Foot ulcer (HCC) 2024    SOB (shortness of breath) 2024    Nausea 2024    Elevated troponin 2024    History of DVT (deep vein thrombosis) 2024    Diabetic ulcer of toe of left foot associated with type 2 diabetes mellitus, limited to breakdown of skin ([facility]) 2024    Plantar fasciitis, right 07/10/2024    Acute deep vein thrombosis (DVT) of left peroneal vein ([facility]) 2024    Iron deficiency anemia 2024    Shortness of breath 2024    History of colon polyps 2024    Duodenal ulcer 2024    Multiple gastric ulcers 2023    Iron deficiency anemia due to chronic blood loss 2023    Melena 2023    Symptomatic anemia 2023    Frequent PVCs 2023    Acute on chronic diastolic heart failure ([facility]) 2023    Stage 3b chronic kidney disease ([facility]) 2023    Diabetic ulcer of right midfoot associated with diabetes mellitus due to underlying condition, limited to breakdown of skin ([facility]) 06/15/2023    Hypertensive urgency 2023    Elevated troponin level not due myocardial infarction 2023    Stable angina pectoris ([facility]) 2023    Chronic kidney disease-mineral and bone disorder 2022    Mild aortic stenosis 2022    Chronic HFpEF/Moderate pHTN (HFpEF) ([facility]) 11/10/2022    Gross hematuria 2022    Platelets decreased ([facility]) 2022    Acute kidney injury superimposed on chronic kidney disease  ([facility]) 2022    Actinic keratoses 2022    Type 2 diabetes mellitus with diabetic peripheral angiopathy without gangrene, with long-term current use of insulin ([facility]) 2022     Varicose veins of left lower extremity 06/25/2021    History of endovascular stent graft for abdominal aortic aneurysm (AAA) 06/25/2021    Bruit (arterial) 06/25/2021    Peripheral artery disease (HCC) 06/25/2021    Type 2 diabetes mellitus with diabetic polyneuropathy, with long-term current use of insulin (HCC) 06/10/2021    Mixed hyperlipidemia 05/11/2021    Primary hypertension 05/11/2021    Coronary artery disease involving native coronary artery of native heart without angina pectoris 05/11/2021    S/P angioplasty with stent 05/11/2021    Hx of CABG 05/11/2021    S/P AAA repair 05/11/2021    S/P prostatectomy 05/11/2021    H/O prostate cancer 05/11/2021     No orders of the defined types were placed in this encounter.

## 2024-12-04 ENCOUNTER — OFFICE VISIT (OUTPATIENT)
Dept: WOUND CARE | Facility: HOSPITAL | Age: 79
End: 2024-12-04
Payer: COMMERCIAL

## 2024-12-04 ENCOUNTER — CLINICAL SUPPORT (OUTPATIENT)
Dept: CARDIOLOGY CLINIC | Facility: CLINIC | Age: 79
End: 2024-12-04
Payer: COMMERCIAL

## 2024-12-04 VITALS
SYSTOLIC BLOOD PRESSURE: 149 MMHG | RESPIRATION RATE: 18 BRPM | DIASTOLIC BLOOD PRESSURE: 74 MMHG | TEMPERATURE: 97.2 F | HEART RATE: 69 BPM

## 2024-12-04 VITALS
SYSTOLIC BLOOD PRESSURE: 140 MMHG | OXYGEN SATURATION: 96 % | BODY MASS INDEX: 30.54 KG/M2 | HEART RATE: 61 BPM | HEIGHT: 74 IN | WEIGHT: 238 LBS | DIASTOLIC BLOOD PRESSURE: 70 MMHG

## 2024-12-04 DIAGNOSIS — L97.525 DIABETIC ULCER OF LEFT FOOT ASSOCIATED WITH TYPE 2 DIABETES MELLITUS, WITH MUSCLE INVOLVEMENT WITHOUT EVIDENCE OF NECROSIS, UNSPECIFIED PART OF FOOT (HCC): ICD-10-CM

## 2024-12-04 DIAGNOSIS — E11.621 DIABETIC ULCER OF TOE OF LEFT FOOT ASSOCIATED WITH TYPE 2 DIABETES MELLITUS, LIMITED TO BREAKDOWN OF SKIN (HCC): ICD-10-CM

## 2024-12-04 DIAGNOSIS — M77.41 METATARSALGIA OF BOTH FEET: ICD-10-CM

## 2024-12-04 DIAGNOSIS — L97.421 DIABETIC ULCER OF LEFT HEEL ASSOCIATED WITH TYPE 2 DIABETES MELLITUS, LIMITED TO BREAKDOWN OF SKIN (HCC): ICD-10-CM

## 2024-12-04 DIAGNOSIS — L97.521 DIABETIC ULCER OF TOE OF LEFT FOOT ASSOCIATED WITH TYPE 2 DIABETES MELLITUS, LIMITED TO BREAKDOWN OF SKIN (HCC): ICD-10-CM

## 2024-12-04 DIAGNOSIS — E11.42 DIABETIC POLYNEUROPATHY ASSOCIATED WITH TYPE 2 DIABETES MELLITUS (HCC): ICD-10-CM

## 2024-12-04 DIAGNOSIS — I70.209 PERIPHERAL ARTERIOSCLEROSIS (HCC): ICD-10-CM

## 2024-12-04 DIAGNOSIS — I49.3 PVC (PREMATURE VENTRICULAR CONTRACTION): ICD-10-CM

## 2024-12-04 DIAGNOSIS — I25.10 CORONARY ARTERY DISEASE INVOLVING NATIVE CORONARY ARTERY OF NATIVE HEART WITHOUT ANGINA PECTORIS: Primary | ICD-10-CM

## 2024-12-04 DIAGNOSIS — M54.16 RADICULOPATHY OF LUMBAR REGION: ICD-10-CM

## 2024-12-04 DIAGNOSIS — M77.42 METATARSALGIA OF BOTH FEET: ICD-10-CM

## 2024-12-04 DIAGNOSIS — I49.3 FREQUENT PVCS: ICD-10-CM

## 2024-12-04 DIAGNOSIS — E11.621 DIABETIC ULCER OF LEFT HEEL ASSOCIATED WITH TYPE 2 DIABETES MELLITUS, LIMITED TO BREAKDOWN OF SKIN (HCC): ICD-10-CM

## 2024-12-04 DIAGNOSIS — E11.621 DIABETIC ULCER OF LEFT FOOT ASSOCIATED WITH TYPE 2 DIABETES MELLITUS, WITH MUSCLE INVOLVEMENT WITHOUT EVIDENCE OF NECROSIS, UNSPECIFIED PART OF FOOT (HCC): ICD-10-CM

## 2024-12-04 LAB
GLUCOSE SERPL-MCNC: 171 MG/DL (ref 65–140)
GLUCOSE SERPL-MCNC: 188 MG/DL (ref 65–140)

## 2024-12-04 PROCEDURE — 99183 HYPERBARIC OXYGEN THERAPY: CPT | Performed by: FAMILY MEDICINE

## 2024-12-04 PROCEDURE — G0277 HBOT, FULL BODY CHAMBER, 30M: HCPCS | Performed by: FAMILY MEDICINE

## 2024-12-04 PROCEDURE — 99211 OFF/OP EST MAY X REQ PHY/QHP: CPT

## 2024-12-04 PROCEDURE — 93000 ELECTROCARDIOGRAM COMPLETE: CPT

## 2024-12-04 PROCEDURE — 82948 REAGENT STRIP/BLOOD GLUCOSE: CPT | Performed by: FAMILY MEDICINE

## 2024-12-04 RX ORDER — METOPROLOL TARTRATE 25 MG/1
12.5 TABLET, FILM COATED ORAL EVERY 12 HOURS SCHEDULED
Qty: 90 TABLET | Refills: 0 | Status: ON HOLD | OUTPATIENT
Start: 2024-12-04 | End: 2025-03-04

## 2024-12-04 RX ORDER — DULOXETIN HYDROCHLORIDE 30 MG/1
30 CAPSULE, DELAYED RELEASE ORAL DAILY
Qty: 90 CAPSULE | Refills: 0 | Status: ON HOLD | OUTPATIENT
Start: 2024-12-04

## 2024-12-04 NOTE — PROGRESS NOTES
HBO Treatment Course Details       Treatment Notes: Treatment tolerated well.  No complaints of pain or discomfort     Treatment Course Number: 24  Total Treatments Ordered:  40     Diagnosis:   1. Diabetic ulcer of left foot associated with type 2 diabetes mellitus, with muscle involvement without evidence of necrosis, unspecified part of foot (HCC)  [facility]          HBO Treatment Details:  In-Patient Visit: no  Treatment Length:90 Minutes(Minutes)  Chamber #: Hard sided Monoplace Chamber    Pre-Treatment details:  Pre-treatment protocol Treatment Protocol: 2.5 INDY X 90 minutes w/ 100% oxygen, two 5 minute air breaks  Left ear clear?: yes  Right ear clear?: yes  Left ear intact?: yes  Right ear intact?: yes  Left ear color: Translucent  Right ear color: Translucent  PE Tubes present, Left ear?: yes  PE Tubes present, Right ear?: yes  Left ear irrigated?: no  Right ear irrigated?: no  Left ear TEED scale: Grade 0  Right ear TEED scale: Grade 0   Pretreatment heart and lung assessment: Pretreatment heart and lung auscltation unremarkable. Patient cleared for HBOT     Treatment details:  INDY Rate: 2.5  Started Compression: 0815  Reached Compression: 0825  Total Compression Time: 10 (Minutes)  Total Holding Time: 100 (Minutes)  Started Decompression: 1005  Reached Surface: 1015  Total Decompression Time: 10 (Minutes)  Total Airbreaks:   (Minutes)  Total Time of Treatment: 120 (Minutes)  Symptoms Noted During Treatment: None (Minutes)    Post treatment details:                                                    Vital Signs:  HBO Glucose Reference Range: 100-350 mg/dl   Pre-Treatment Post-Treatment   Time vitals are taken: 0805 Time vitals are taken: 1020   Blood Pressure: 149/74 Blood Pressure: 143/65   Pulse: 69 Pulse: 57   Resp: 18 Resp: 18   Temp: 97.2 °F (36.2 °C) Temp: 97.2 °F (36.2 °C)   Pre-Inspection Glucose readin Post-Inspection Glucose readin       Allergies   Allergen Reactions     Lisinopril Rash and Lip Swelling     Patient Active Problem List    Diagnosis Date Noted    Foot ulcer (HCC) 09/07/2024    SOB (shortness of breath) 09/06/2024    Nausea 09/06/2024    Elevated troponin 09/06/2024    History of DVT (deep vein thrombosis) 08/26/2024    Diabetic ulcer of toe of left foot associated with type 2 diabetes mellitus, limited to breakdown of skin (HCC) 08/08/2024    Plantar fasciitis, right 07/10/2024    Acute deep vein thrombosis (DVT) of left peroneal vein ([facility]) 05/01/2024    Iron deficiency anemia 04/23/2024    Shortness of breath 04/09/2024    History of colon polyps 02/22/2024    Duodenal ulcer 02/01/2024    Multiple gastric ulcers 12/27/2023    Iron deficiency anemia due to chronic blood loss 12/27/2023    Melena 12/26/2023    Symptomatic anemia 12/26/2023    Frequent PVCs 06/17/2023    Acute on chronic diastolic heart failure (HCC) 06/16/2023    Stage 3b chronic kidney disease ([facility]) 06/16/2023    Diabetic ulcer of right midfoot associated with diabetes mellitus due to underlying condition, limited to breakdown of skin ([facility]) 06/15/2023    Hypertensive urgency 05/11/2023    Elevated troponin level not due myocardial infarction 05/11/2023    Stable angina pectoris ([facility]) 03/07/2023    Chronic kidney disease-mineral and bone disorder 11/30/2022    Mild aortic stenosis 11/11/2022    Chronic HFpEF/Moderate pHTN (HFpEF) ([facility]) 11/10/2022    Gross hematuria 07/26/2022    Platelets decreased ([facility]) 07/22/2022    Acute kidney injury superimposed on chronic kidney disease  ([facility]) 02/16/2022    Actinic keratoses 01/14/2022    Type 2 diabetes mellitus with diabetic peripheral angiopathy without gangrene, with long-term current use of insulin ([facility]) 01/11/2022    Varicose veins of left lower extremity 06/25/2021    History of endovascular stent graft for abdominal aortic aneurysm (AAA) 06/25/2021    Bruit (arterial) 06/25/2021    Peripheral artery disease (HCC) 06/25/2021    Type 2 diabetes mellitus  with diabetic polyneuropathy, with long-term current use of insulin (HCC) 06/10/2021    Mixed hyperlipidemia 05/11/2021    Primary hypertension 05/11/2021    Coronary artery disease involving native coronary artery of native heart without angina pectoris 05/11/2021    S/P angioplasty with stent 05/11/2021    Hx of CABG 05/11/2021    S/P AAA repair 05/11/2021    S/P prostatectomy 05/11/2021    H/O prostate cancer 05/11/2021     No orders of the defined types were placed in this encounter.

## 2024-12-04 NOTE — TELEPHONE ENCOUNTER
Pt called back LEAD was just scheduled on  11/29/24 and was scheduled for next available which is scheduled for 12/27/24.   I did not see anything available for 1-2 week office visit.

## 2024-12-04 NOTE — PROGRESS NOTES
HBO Treatment Course Details       Treatment Notes: 24     Treatment Course Number: 24  Total Treatments Ordered:  40     Diagnosis:   1. Diabetic ulcer of left foot associated with type 2 diabetes mellitus, with muscle involvement without evidence of necrosis, unspecified part of foot (HCC)  [facility]          [facility] Treatment Details:  In-Patient Visit:   Treatment Length:90 Minutes(Minutes)  Chamber #: Hard sided Monoplace Chamber    Pre-Treatment details:  Pre-treatment protocol Treatment Protocol: 2.5 INDY X 90 minutes w/ 100% oxygen, two 5 minute air breaks  Left ear clear?: yes  Right ear clear?: yes  Left ear intact?: yes  Right ear intact?: yes  Left ear color: Translucent  Right ear color: Translucent  PE Tubes present, Left ear?: yes  PE Tubes present, Right ear?: yes  Left ear irrigated?: no  Right ear irrigated?: no  Left ear TEED scale: Grade 0  Right ear TEED scale: Grade 0   Pretreatment heart and lung assessment: Pretreatment heart and lung auscltation unremarkable. Patient cleared for HBOT     Treatment details:  INDY Rate: 2.5  Started Compression: 0815  Reached Compression: 0825  Total Compression Time: 10 (Minutes)  Total Holding Time: 100 (Minutes)  Started Decompression: 1005  Reached Surface: 1015  Total Decompression Time: 10 (Minutes)  Total Airbreaks:   (Minutes)  Total Time of Treatment: 120 (Minutes)  Symptoms Noted During Treatment: None (Minutes)    Post treatment details:  Left ear clear?: yes  Right ear clear?: yes  Left ears intact?: yes  Right ears intact?: yes  Left ear color: Translucent  Right ear color: Translucent  PE Tubes present, Left ear?: yes  PE Tubes present, Right ear?: yes        Left ear TEED scale: Grade 0  Right ear TEED scale: Grade 0  Post treatment heart and lung assessment: Post treatment heart and lung auscltation unremarkable. Patient cleared for discharge. Tolerated treatment well.             Vital Signs:  HBO Glucose Reference Range: 100-350  mg/dl   Pre-Treatment Post-Treatment   Time vitals are taken: 0805 Time vitals are taken: 1020   Blood Pressure: 149/74 Blood Pressure: 143/65   Pulse: 69 Pulse: 57   Resp: 18 Resp: 18   Temp: 97.2 °F (36.2 °C) Temp: 97.2 °F (36.2 °C)   Pre-Inspection Glucose readin Post-Inspection Glucose readin       Allergies   Allergen Reactions    Lisinopril Rash and Lip Swelling     Patient Active Problem List    Diagnosis Date Noted    Foot ulcer (HCC) 2024    SOB (shortness of breath) 2024    Nausea 2024    Elevated troponin 2024    History of DVT (deep vein thrombosis) 2024    Diabetic ulcer of toe of left foot associated with type 2 diabetes mellitus, limited to breakdown of skin ([facility]) 2024    Plantar fasciitis, right 07/10/2024    Acute deep vein thrombosis (DVT) of left peroneal vein ([facility]) 2024    Iron deficiency anemia 2024    Shortness of breath 2024    History of colon polyps 2024    Duodenal ulcer 2024    Multiple gastric ulcers 2023    Iron deficiency anemia due to chronic blood loss 2023    Melena 2023    Symptomatic anemia 2023    Frequent PVCs 2023    Acute on chronic diastolic heart failure (HCC) 2023    Stage 3b chronic kidney disease ([facility]) 2023    Diabetic ulcer of right midfoot associated with diabetes mellitus due to underlying condition, limited to breakdown of skin ([facility]) 06/15/2023    Hypertensive urgency 2023    Elevated troponin level not due myocardial infarction 2023    Stable angina pectoris ([facility]) 2023    Chronic kidney disease-mineral and bone disorder 2022    Mild aortic stenosis 2022    Chronic HFpEF/Moderate pHTN (HFpEF) ([facility]) 11/10/2022    Gross hematuria 2022    Platelets decreased ([facility]) 2022    Acute kidney injury superimposed on chronic kidney disease  ([facility]) 2022    Actinic keratoses 2022    Type 2 diabetes mellitus with  diabetic peripheral angiopathy without gangrene, with long-term current use of insulin ([facility]) 01/11/2022    Varicose veins of left lower extremity 06/25/2021    History of endovascular stent graft for abdominal aortic aneurysm (AAA) 06/25/2021    Bruit (arterial) 06/25/2021    Peripheral artery disease (HCC) 06/25/2021    Type 2 diabetes mellitus with diabetic polyneuropathy, with long-term current use of insulin ([facility]) 06/10/2021    Mixed hyperlipidemia 05/11/2021    Primary hypertension 05/11/2021    Coronary artery disease involving native coronary artery of native heart without angina pectoris 05/11/2021    S/P angioplasty with stent 05/11/2021    Hx of CABG 05/11/2021    S/P AAA repair 05/11/2021    S/P prostatectomy 05/11/2021    H/O prostate cancer 05/11/2021     No orders of the defined types were placed in this encounter.

## 2024-12-04 NOTE — TELEPHONE ENCOUNTER
Patient would like to know if he is to continue taking this medication, if so, he needs refills     Reason for call:   [x] Refill   [] Prior Auth  [] Other:     Office:   [] PCP/Provider -   [x] Specialty/Provider -     Medication: Duloxetine 30 mg, take 1 capsule by mouth once daily       Pharmacy: [facility]    Does the patient have enough for 3 days?   [x] Yes   [] No - Send as HP to POD

## 2024-12-04 NOTE — TELEPHONE ENCOUNTER
Patient returning voicemail to make appointment for EKG. Warm transferred patient to [facility] office.

## 2024-12-04 NOTE — PROGRESS NOTES
Pt came in due to low HR per Dr Pereira.   Per Dr Flores, pt to take metoprolol 12.5mg bid. Pt agreeable.  RX sent to [facility] per pt request

## 2024-12-05 ENCOUNTER — OFFICE VISIT (OUTPATIENT)
Dept: WOUND CARE | Facility: HOSPITAL | Age: 79
End: 2024-12-05
Payer: COMMERCIAL

## 2024-12-05 VITALS
TEMPERATURE: 97.6 F | RESPIRATION RATE: 18 BRPM | DIASTOLIC BLOOD PRESSURE: 77 MMHG | HEART RATE: 57 BPM | SYSTOLIC BLOOD PRESSURE: 134 MMHG

## 2024-12-05 DIAGNOSIS — I70.223 REST PAIN OF BOTH LOWER EXTREMITIES DUE TO ATHEROSCLEROSIS (HCC): Primary | ICD-10-CM

## 2024-12-05 DIAGNOSIS — E11.621 DIABETIC ULCER OF LEFT FOOT ASSOCIATED WITH TYPE 2 DIABETES MELLITUS, WITH MUSCLE INVOLVEMENT WITHOUT EVIDENCE OF NECROSIS, UNSPECIFIED PART OF FOOT (HCC): ICD-10-CM

## 2024-12-05 DIAGNOSIS — L97.525 DIABETIC ULCER OF LEFT FOOT ASSOCIATED WITH TYPE 2 DIABETES MELLITUS, WITH MUSCLE INVOLVEMENT WITHOUT EVIDENCE OF NECROSIS, UNSPECIFIED PART OF FOOT (HCC): ICD-10-CM

## 2024-12-05 LAB
GLUCOSE SERPL-MCNC: 180 MG/DL (ref 65–140)
GLUCOSE SERPL-MCNC: 213 MG/DL (ref 65–140)

## 2024-12-05 PROCEDURE — G0277 HBOT, FULL BODY CHAMBER, 30M: HCPCS | Performed by: FAMILY MEDICINE

## 2024-12-05 PROCEDURE — 99183 HYPERBARIC OXYGEN THERAPY: CPT | Performed by: FAMILY MEDICINE

## 2024-12-05 PROCEDURE — 82948 REAGENT STRIP/BLOOD GLUCOSE: CPT | Performed by: FAMILY MEDICINE

## 2024-12-05 NOTE — TELEPHONE ENCOUNTER
Lets first get LEAD to assess post intervention bloodflow.   I can place STAT order if unable to get prior to what is already scheduled 12/27. Would like it within a week.    Other recs from [facility] stand, if signs of infection, fever or chills, or concern for OM, go to ED.     Is he experiencing LE pain or symptoms? Or is the concern wound healing?

## 2024-12-05 NOTE — TELEPHONE ENCOUNTER
"Patient denies pain or any symptoms. Patient reports L heel wound is \"healing very well\" per the wound care. The RLE toe wound \"she doesn't like how the wound is healing\".   "

## 2024-12-05 NOTE — PROGRESS NOTES
HBO Treatment Course Details       Treatment Notes: Treatment tolerated well ,  No complaints of pain or discomfort.  Dresssing changed as ordered     Treatment Course Number: 25  Total Treatments Ordered:  40     Diagnosis:   1. Diabetic ulcer of left foot associated with type 2 diabetes mellitus, with muscle involvement without evidence of necrosis, unspecified part of foot (HCC)  [facility]ic oxygen [facility]          HBO Treatment Details:  In-Patient Visit: no  Treatment Length:90 Minutes(Minutes)  Chamber #: Hard sided Monoplace Chamber    Pre-Treatment details:  Pre-treatment protocol Treatment Protocol: 2.5 INDY X 90 minutes w/ 100% oxygen, two 5 minute air breaks  Left ear clear?: yes  Right ear clear?: yes  Left ear intact?: yes  Right ear intact?: yes        PE Tubes present, Left ear?: yes  PE Tubes present, Right ear?: yes  Left ear irrigated?: no  Right ear irrigated?: no  Left ear TEED scale: Grade 0  Right ear TEED scale: Grade 0   Pretreatment heart and lung assessment: Pretreatment heart and lung auscltation unremarkable. Patient cleared for HBOT (chronic murmur present on cardiac exam)     Treatment details:  INDY Rate: 2.5  Started Compression: 0823  Reached Compression: 0833  Total Compression Time: 10 (Minutes)  Total Holding Time: 100 (Minutes)  Started Decompression: 1013  Reached Surface: 1023  Total Decompression Time: 10 (Minutes)  Total Airbreaks:   (Minutes)  Total Time of Treatment: 120 (Minutes)  Symptoms Noted During Treatment: None (Minutes)    Post treatment details:  Left ear clear?: yes  Right ear clear?: yes              PE Tubes present, Left ear?: yes  PE Tubes present, Right ear?: yes        Left ear TEED scale: Grade 0  Right ear TEED scale: Grade 0  Post treatment heart and lung assessment: Post treatment heart and lung auscltation unremarkable. Patient cleared for discharge. Tolerated treatment well.             Vital Signs:  HBO Glucose Reference Range: 100-350 mg/dl    Pre-Treatment Post-Treatment   Time vitals are taken: 0805 Time vitals are taken: 1030   Blood Pressure: 134/77 Blood Pressure: 130/73   Pulse: 57 Pulse: 66   Resp: 18 Resp: 18   Temp: 97.6 °F (36.4 °C) Temp: 97.4 °F (36.3 °C)   Pre-Inspection Glucose readin Post-Inspection Glucose readin       Allergies   Allergen Reactions    Lisinopril Rash and Lip Swelling     Patient Active Problem List    Diagnosis Date Noted    Foot ulcer (HCC) 2024    SOB (shortness of breath) 2024    Nausea 2024    Elevated troponin 2024    History of DVT (deep vein thrombosis) 2024    Diabetic ulcer of toe of left foot associated with type 2 diabetes mellitus, limited to breakdown of skin ([facility]) 2024    Plantar fasciitis, right 07/10/2024    Acute deep vein thrombosis (DVT) of left peroneal vein ([facility]) 2024    Iron deficiency anemia 2024    Shortness of breath 2024    History of colon polyps 2024    Duodenal ulcer 2024    Multiple gastric ulcers 2023    Iron deficiency anemia due to chronic blood loss 2023    Melena 2023    Symptomatic anemia 2023    Frequent PVCs 2023    Acute on chronic diastolic heart failure (HCC) 2023    Stage 3b chronic kidney disease ([facility]) 2023    Diabetic ulcer of right midfoot associated with diabetes mellitus due to underlying condition, limited to breakdown of skin ([facility]) 06/15/2023    Hypertensive urgency 2023    Elevated troponin level not due myocardial infarction 2023    Stable angina pectoris ([facility]) 2023    Chronic kidney disease-mineral and bone disorder 2022    Mild aortic stenosis 2022    Chronic HFpEF/Moderate pHTN (HFpEF) ([facility]) 11/10/2022    Gross hematuria 2022    Platelets decreased ([facility]) 2022    Acute kidney injury superimposed on chronic kidney disease  ([facility]) 2022    Actinic keratoses 2022    Type 2 diabetes mellitus with  diabetic peripheral angiopathy without gangrene, with long-term current use of insulin ([facility]) 01/11/2022    Varicose veins of left lower extremity 06/25/2021    History of endovascular stent graft for abdominal aortic aneurysm (AAA) 06/25/2021    Bruit (arterial) 06/25/2021    Peripheral artery disease (HCC) 06/25/2021    Type 2 diabetes mellitus with diabetic polyneuropathy, with long-term current use of insulin ([facility]) 06/10/2021    Mixed hyperlipidemia 05/11/2021    Primary hypertension 05/11/2021    Coronary artery disease involving native coronary artery of native heart without angina pectoris 05/11/2021    S/P angioplasty with stent 05/11/2021    Hx of CABG 05/11/2021    S/P AAA repair 05/11/2021    S/P prostatectomy 05/11/2021    H/O prostate cancer 05/11/2021     No orders of the defined types were placed in this encounter.

## 2024-12-05 NOTE — TELEPHONE ENCOUNTER
OK- Please get updated LEAD (before what is scheduled currently)  and can see any, first avail provider to review.

## 2024-12-06 ENCOUNTER — OFFICE VISIT (OUTPATIENT)
Dept: WOUND CARE | Facility: HOSPITAL | Age: 79
End: 2024-12-06
Payer: COMMERCIAL

## 2024-12-06 VITALS
HEART RATE: 58 BPM | TEMPERATURE: 96.8 F | DIASTOLIC BLOOD PRESSURE: 88 MMHG | RESPIRATION RATE: 18 BRPM | SYSTOLIC BLOOD PRESSURE: 169 MMHG

## 2024-12-06 DIAGNOSIS — E11.621 DIABETIC ULCER OF LEFT FOOT ASSOCIATED WITH TYPE 2 DIABETES MELLITUS, WITH MUSCLE INVOLVEMENT WITHOUT EVIDENCE OF NECROSIS, UNSPECIFIED PART OF FOOT (HCC): ICD-10-CM

## 2024-12-06 DIAGNOSIS — L97.525 DIABETIC ULCER OF LEFT FOOT ASSOCIATED WITH TYPE 2 DIABETES MELLITUS, WITH MUSCLE INVOLVEMENT WITHOUT EVIDENCE OF NECROSIS, UNSPECIFIED PART OF FOOT (HCC): ICD-10-CM

## 2024-12-06 LAB
GLUCOSE SERPL-MCNC: 216 MG/DL (ref 65–140)
GLUCOSE SERPL-MCNC: 256 MG/DL (ref 65–140)

## 2024-12-06 PROCEDURE — 99183 HYPERBARIC OXYGEN THERAPY: CPT | Performed by: FAMILY MEDICINE

## 2024-12-06 PROCEDURE — 82948 REAGENT STRIP/BLOOD GLUCOSE: CPT | Performed by: FAMILY MEDICINE

## 2024-12-06 PROCEDURE — G0277 HBOT, FULL BODY CHAMBER, 30M: HCPCS | Performed by: FAMILY MEDICINE

## 2024-12-06 NOTE — PROGRESS NOTES
HBO Treatment Course Details       Treatment Notes: Treatment tolerated well.  No complaints of pain or discomfort.  Dressing changed to both feet today     Treatment Course Number: 26  Total Treatments Ordered:  40     Diagnosis:   1. Diabetic ulcer of left foot associated with type 2 diabetes mellitus, with muscle involvement without evidence of necrosis, unspecified part of foot (HCC)  Hyperbaric oxygen thearpy          HBO Treatment Details:  In-Patient Visit: no  Treatment Length:90 Minutes(Minutes)  Chamber #: Hard sided Monoplace Chamber    Pre-Treatment details:  Pre-treatment protocol Treatment Protocol: 2.5 INDY X 90 minutes w/ 100% oxygen, two 5 minute air breaks  Left ear clear?: yes  Right ear clear?: yes  Left ear intact?: yes  Right ear intact?: yes        PE Tubes present, Left ear?: yes  PE Tubes present, Right ear?: yes  Left ear irrigated?: no  Right ear irrigated?: no  Left ear TEED scale: Grade 0  Right ear TEED scale: Grade 0   Pretreatment heart and lung assessment: Pretreatment heart and lung auscltation unremarkable. Patient cleared for HBOT (chronic murmur present on cardiac exam)     Treatment details:  INDY Rate: 2.5  Started Compression: 0823  Reached Compression: 0833  Total Compression Time: 10 (Minutes)  Total Holding Time: 100 (Minutes)  Started Decompression: 1013  Reached Surface: 1023  Total Decompression Time: 10 (Minutes)  Total Airbreaks:   (Minutes)  Total Time of Treatment: 120 (Minutes)  Symptoms Noted During Treatment: None (Minutes)    Post treatment details:  Left ear clear?: yes  Right ear clear?: yes              PE Tubes present, Left ear?: yes  PE Tubes present, Right ear?: yes        Left ear TEED scale: Grade 0  Right ear TEED scale: Grade 0  Post treatment heart and lung assessment: Post treatment heart and lung auscltation unremarkable. Patient cleared for discharge. Tolerated treatment well.             Vital Signs:  HBO Glucose Reference Range: 100-350 mg/dl    Pre-Treatment Post-Treatment   Time vitals are taken: 0810 Time vitals are taken: 1030   Blood Pressure: 169/88 Blood Pressure: 157/83   Pulse: 58 Pulse: 70   Resp: 18 Resp: 18   Temp: 96.8 °F (36 °C) Temp: 97.6 °F (36.4 °C)   Pre-Inspection Glucose readin Post-Inspection Glucose readin       Allergies   Allergen Reactions    Lisinopril Rash and Lip Swelling     Patient Active Problem List    Diagnosis Date Noted    Foot ulcer (HCC) 2024    SOB (shortness of breath) 2024    Nausea 2024    Elevated troponin 2024    History of DVT (deep vein thrombosis) 2024    Diabetic ulcer of toe of left foot associated with type 2 diabetes mellitus, limited to breakdown of skin ([facility]) 2024    Plantar fasciitis, right 07/10/2024    Acute deep vein thrombosis (DVT) of left peroneal vein ([facility]) 2024    Iron deficiency anemia 2024    Shortness of breath 2024    History of colon polyps 2024    Duodenal ulcer 2024    Multiple gastric ulcers 2023    Iron deficiency anemia due to chronic blood loss 2023    Melena 2023    Symptomatic anemia 2023    Frequent PVCs 2023    Acute on chronic diastolic heart failure (HCC) 2023    Stage 3b chronic kidney disease ([facility]) 2023    Diabetic ulcer of right midfoot associated with diabetes mellitus due to underlying condition, limited to breakdown of skin ([facility]) 06/15/2023    Hypertensive urgency 2023    Elevated troponin level not due myocardial infarction 2023    Stable angina pectoris ([facility]) 2023    Chronic kidney disease-mineral and bone disorder 2022    Mild aortic stenosis 2022    Chronic HFpEF/Moderate pHTN (HFpEF) ([facility]) 11/10/2022    Gross hematuria 2022    Platelets decreased ([facility]) 2022    Acute kidney injury superimposed on chronic kidney disease  ([facility]) 2022    Actinic keratoses 2022    Type 2 diabetes mellitus with diabetic  peripheral angiopathy without gangrene, with long-term current use of insulin ([facility]) 01/11/2022    Varicose veins of left lower extremity 06/25/2021    History of endovascular stent graft for abdominal aortic aneurysm (AAA) 06/25/2021    Bruit (arterial) 06/25/2021    Peripheral artery disease ([facility]) 06/25/2021    Type 2 diabetes mellitus with diabetic polyneuropathy, with long-term current use of insulin ([facility]) 06/10/2021    Mixed hyperlipidemia 05/11/2021    Primary hypertension 05/11/2021    Coronary artery disease involving native coronary artery of native heart without angina pectoris 05/11/2021    S/P angioplasty with stent 05/11/2021    Hx of CABG 05/11/2021    S/P AAA repair 05/11/2021    S/P prostatectomy 05/11/2021    H/O prostate cancer 05/11/2021     No orders of the defined types were placed in this encounter.

## 2024-12-09 ENCOUNTER — OFFICE VISIT (OUTPATIENT)
Dept: WOUND CARE | Facility: HOSPITAL | Age: 79
End: 2024-12-09
Payer: COMMERCIAL

## 2024-12-09 VITALS
HEART RATE: 65 BPM | DIASTOLIC BLOOD PRESSURE: 63 MMHG | TEMPERATURE: 97.5 F | SYSTOLIC BLOOD PRESSURE: 132 MMHG | RESPIRATION RATE: 18 BRPM

## 2024-12-09 DIAGNOSIS — L97.522 DIABETIC ULCER OF LEFT FOOT ASSOCIATED WITH TYPE 2 DIABETES MELLITUS, WITH FAT LAYER EXPOSED, UNSPECIFIED PART OF FOOT (HCC): ICD-10-CM

## 2024-12-09 DIAGNOSIS — L97.525 DIABETIC ULCER OF LEFT FOOT ASSOCIATED WITH TYPE 2 DIABETES MELLITUS, WITH MUSCLE INVOLVEMENT WITHOUT EVIDENCE OF NECROSIS, UNSPECIFIED PART OF FOOT (HCC): ICD-10-CM

## 2024-12-09 DIAGNOSIS — E11.621 DIABETIC ULCER OF LEFT FOOT ASSOCIATED WITH TYPE 2 DIABETES MELLITUS, WITH MUSCLE INVOLVEMENT WITHOUT EVIDENCE OF NECROSIS, UNSPECIFIED PART OF FOOT (HCC): ICD-10-CM

## 2024-12-09 DIAGNOSIS — E11.621 DIABETIC ULCER OF TOE OF RIGHT FOOT ASSOCIATED WITH TYPE 2 DIABETES MELLITUS, LIMITED TO BREAKDOWN OF SKIN (HCC): Primary | ICD-10-CM

## 2024-12-09 DIAGNOSIS — E11.621 DIABETIC ULCER OF LEFT FOOT ASSOCIATED WITH TYPE 2 DIABETES MELLITUS, WITH FAT LAYER EXPOSED, UNSPECIFIED PART OF FOOT (HCC): ICD-10-CM

## 2024-12-09 DIAGNOSIS — L97.511 DIABETIC ULCER OF TOE OF RIGHT FOOT ASSOCIATED WITH TYPE 2 DIABETES MELLITUS, LIMITED TO BREAKDOWN OF SKIN (HCC): Primary | ICD-10-CM

## 2024-12-09 LAB
GLUCOSE SERPL-MCNC: 199 MG/DL (ref 65–140)
GLUCOSE SERPL-MCNC: 222 MG/DL (ref 65–140)

## 2024-12-09 PROCEDURE — 99183 HYPERBARIC OXYGEN THERAPY: CPT | Performed by: FAMILY MEDICINE

## 2024-12-09 PROCEDURE — G0277 HBOT, FULL BODY CHAMBER, 30M: HCPCS | Performed by: FAMILY MEDICINE

## 2024-12-09 PROCEDURE — 82948 REAGENT STRIP/BLOOD GLUCOSE: CPT | Performed by: FAMILY MEDICINE

## 2024-12-09 PROCEDURE — 99213 OFFICE O/P EST LOW 20 MIN: CPT | Performed by: FAMILY MEDICINE

## 2024-12-09 PROCEDURE — 11042 DBRDMT SUBQ TIS 1ST 20SQCM/<: CPT | Performed by: FAMILY MEDICINE

## 2024-12-09 RX ORDER — LIDOCAINE 40 MG/G
CREAM TOPICAL ONCE
Status: COMPLETED | OUTPATIENT
Start: 2024-12-09 | End: 2024-12-09

## 2024-12-09 RX ADMIN — LIDOCAINE 1 APPLICATION: 40 CREAM TOPICAL at 11:29

## 2024-12-09 NOTE — PROGRESS NOTES
Wound Procedure Treatment Diabetic Ulcer Left Heel    Performed by: Nadege Pinzon RN  Authorized by: Nitza Sotelo DO    Associated wounds:   Wound 08/15/24 Diabetic Ulcer Heel Left  Wound cleansed with:  NSS  Applied primary dressing:  Calcium alginate and Silver  Applied secondary dressing:  ABD  Dressing secured with:  Ender and Tape  Wound Procedure Treatment Diabetic Ulcer Right;Posterior Toe D1, great    Performed by: Nadege Pinzon RN  Authorized by: Nitza Sotelo DO    Associated wounds:   Wound 11/08/24 Diabetic Ulcer Toe D1, great Right;Posterior  Wound cleansed with:  NSS  Applied Topical: Betadine    Applied secondary dressing:  Gauze  Dressing secured with:  Ender and Tape

## 2024-12-09 NOTE — PROGRESS NOTES
Name: Thomas Horne      : 1945      MRN: [id number]  Encounter Provider: Nitza Sotelo DO  Encounter Date: 2024   Encounter department: [facility] WOUND C[facility]  :  Assessment & Plan  Diabetic ulcer of toe of right foot associated with type 2 diabetes mellitus, limited to breakdown of skin (HCC)  Wound is slightly larger but covered with dry stable eschar  No clinical signs of infection  Change wound management to Betadine, see wound orders below  Arterial studies scheduled for tomorrow and vascular follow-up is scheduled following day on 2024    Lab Results   Component Value Date    HGBA1C 6.2 (H) 10/14/2024       Orders:    lidocaine (LMX) 4 % cream    Wound cleansing and dressings; Future    Wound Procedure Treatment Diabetic Ulcer Left Heel    Wound Procedure Treatment Diabetic Ulcer Right;Posterior Toe D1, great    Diabetic ulcer of left foot associated with type 2 diabetes mellitus, with fat layer exposed, unspecified part of foot (HCC)  Wound is improved  Debrided as below  Continue wound management silver alginate, see wound orders below  Proper offloading and tight diabetic control discussed  Follow-up in 1 week or call sooner with questions or concerns    Lab Results   Component Value Date    HGBA1C 6.2 (H) 10/14/2024       Orders:    lidocaine (LMX) 4 % cream    Wound cleansing and dressings; Future    Wound Procedure Treatment Diabetic Ulcer Left Heel    Wound Procedure Treatment Diabetic Ulcer Right;Posterior Toe D1, great    Debridement        History of Present Illness   No chief complaint on file.  Patient presents for follow-up of a Sumner 3 DFU of the left heel on her Sumner 1 DFU of the right great toe.  No increased pain or drainage.  Has been using silver alginate on the wounds.  Patient is scheduled for arterial testing tomorrow and follow-up with vascular surgery the following day.      Here for wound follow up.    Objective   There were no vitals taken  for this visit.    Physical Exam  Wound 08/15/24 Diabetic Ulcer Heel Left (Active)   Enter Sumner score: Sumner Grade 3: Deep abscess, OM, or joint sepsis 12/09/24 1114   Wound Image   12/09/24 1136   Wound Description Pink;Yellow;Slough 12/09/24 1114   Clara-wound Assessment Callus 12/09/24 1114   Wound Length (cm) 0.7 cm 12/09/24 1114   Wound Width (cm) 1.5 cm 12/09/24 1114   Wound Depth (cm) 0.2 cm 12/09/24 1114   Wound Surface Area (cm^2) 1.05 cm^2 12/09/24 1114   Wound Volume (cm^3) 0.21 cm^3 12/09/24 1114   Calculated Wound Volume (cm^3) 0.21 cm^3 12/09/24 1114   Change in Wound Size % -16.67 12/09/24 1114   Number of underminings 1 11/15/24 1110   Undermining 1 0.1 11/26/24 1030   Undermining 1 is depth extending from 1-2o'clock 11/26/24 1030   Drainage Amount Moderate 12/09/24 1114   Drainage Description Serosanguineous;Yellow 12/09/24 1114   Non-staged Wound Description Full thickness 12/09/24 1114   Dressing Status Intact 12/09/24 1114       Wound 11/08/24 Diabetic Ulcer Toe D1, great Right;Posterior (Active)   Enter Sumner score: Sumner Grade 1: Partial or full-thickness ulcer (superficial) 12/09/24 1114   Wound Image   12/09/24 1114   Wound Description Brown 12/09/24 1114   Clara-wound Assessment Maceration;White 12/03/24 1109   Wound Length (cm) 1.1 cm 12/09/24 1114   Wound Width (cm) 1.3 cm 12/09/24 1114   Wound Depth (cm) 0.1 cm 12/09/24 1114   Wound Surface Area (cm^2) 1.43 cm^2 12/09/24 1114   Wound Volume (cm^3) 0.143 cm^3 12/09/24 1114   Calculated Wound Volume (cm^3) 0.14 cm^3 12/09/24 1114   Change in Wound Size % -133.33 12/09/24 1114   Drainage Amount Scant 12/09/24 1114   Drainage Description Serosanguineous 12/09/24 1114   Non-staged Wound Description Full thickness 12/09/24 1114   Dressing Status Intact 12/09/24 1114       Debridement   Wound 08/15/24 Diabetic Ulcer Heel Left    Universal Protocol:  procedure performed by consultantConsent: Verbal consent obtained.  Consent given by:  "patient  Time out: Immediately prior to procedure a \"time out\" was called to verify the correct patient, procedure, equipment, support staff and site/side marked as required.  Timeout called at: 12/9/2024 11:30 AM.  Patient understanding: patient states understanding of the procedure being performed  Patient identity confirmed: verbally with patient    Debridement Details  Performed by: physician  Debridement type: surgical  Level of debridement: subcutaneous tissue  Pain control: lidocaine 4%      Post-debridement measurements  Length (cm): 0.7  Width (cm): 1.5  Depth (cm): 0.3  Percent debrided: 100%  Surface Area (cm^2): 1.05  Area Debrided (cm^2): 1.05  Volume (cm^3): 0.31    Tissue and other material debrided: subcutaneous tissue  Devitalized tissue debrided: exudate and slough  Instrument(s) utilized: curette  Bleeding: small  Hemostasis obtained with: pressure  Response to treatment: procedure was tolerated well       Results from last 6 Months   Lab Units 08/08/24  1344   WOUND CULTURE  4+ Growth of Klebsiella pneumoniae*  3+ Growth of Citrobacter koseri*  2+ Growth of Pseudomonas aeruginosa*  2+ Growth of         "

## 2024-12-09 NOTE — PROGRESS NOTES
HBO Treatment Course Details       Treatment Notes: Treatment tolerated well.  No complaints of pain or discomfort   Patient seeing wound care today for follow up  Treatment Course Number: 27  Total Treatments Ordered:  40     Diagnosis:   1. Diabetic ulcer of left foot associated with type 2 diabetes mellitus, with muscle involvement without evidence of necrosis, unspecified part of foot (HCC)  [facility]          HBO Treatment Details:  In-Patient Visit: no  Treatment Length:90 Minutes(Minutes)  Chamber #: Hard sided Monoplace Chamber    Pre-Treatment details:  Pre-treatment protocol Treatment Protocol: 2.5 INDY X 90 minutes w/ 100% oxygen, two 5 minute air breaks  Left ear clear?: yes  Right ear clear?: yes  Left ear intact?: yes  Right ear intact?: yes        PE Tubes present, Left ear?: yes  PE Tubes present, Right ear?: yes  Left ear irrigated?: no  Right ear irrigated?: no  Left ear TEED scale: Grade 0  Right ear TEED scale: Grade 0   Pretreatment heart and lung assessment: Pretreatment heart and lung auscltation unremarkable. Patient cleared for HBOT (chronic murmur present)     Treatment details:  INDY Rate: 2.5  Started Compression: 0817  Reached Compression: 0827  Total Compression Time: 10 (Minutes)  Total Holding Time: 100 (Minutes)  Started Decompression: 1007  Reached Surface: 1017  Total Decompression Time: 10 (Minutes)  Total Airbreaks:   (Minutes)  Total Time of Treatment: 120 (Minutes)  Symptoms Noted During Treatment: None (Minutes)    Post treatment details:  Left ear clear?: yes  Right ear clear?: yes              PE Tubes present, Left ear?: yes  PE Tubes present, Right ear?: yes        Left ear TEED scale: Grade 0  Right ear TEED scale: Grade 0  Post treatment heart and lung assessment: Post treatment heart and lung auscltation unremarkable. Patient cleared for discharge. Tolerated treatment well.             Vital Signs:  HBO Glucose Reference Range: 100-350 mg/dl    Pre-Treatment Post-Treatment   Time vitals are taken: 0805 Time vitals are taken: 1025   Blood Pressure: 132/63 Blood Pressure: 98/58 (NO DISTRESS NOTED)   Pulse: 65 Pulse: 51   Resp: 18 Resp: 16   Temp: 97.5 °F (36.4 °C) Temp: 96.4 °F (35.8 °C)   Pre-Inspection Glucose readin Post-Inspection Glucose readin       Allergies   Allergen Reactions    Lisinopril Rash and Lip Swelling     Patient Active Problem List    Diagnosis Date Noted    Foot ulcer (HCC) 2024    SOB (shortness of breath) 2024    Nausea 2024    Elevated troponin 2024    History of DVT (deep vein thrombosis) 2024    Diabetic ulcer of toe of left foot associated with type 2 diabetes mellitus, limited to breakdown of skin ([facility]) 2024    Plantar fasciitis, right 07/10/2024    Acute deep vein thrombosis (DVT) of left peroneal vein ([facility]) 2024    Iron deficiency anemia 2024    Shortness of breath 2024    History of colon polyps 2024    Duodenal ulcer 2024    Multiple gastric ulcers 2023    Iron deficiency anemia due to chronic blood loss 2023    Melena 2023    Symptomatic anemia 2023    Frequent PVCs 2023    Acute on chronic diastolic heart failure (HCC) 2023    Stage 3b chronic kidney disease ([facility]) 2023    Diabetic ulcer of right midfoot associated with diabetes mellitus due to underlying condition, limited to breakdown of skin ([facility]) 06/15/2023    Hypertensive urgency 2023    Elevated troponin level not due myocardial infarction 2023    Stable angina pectoris ([facility]) 2023    Chronic kidney disease-mineral and bone disorder 2022    Mild aortic stenosis 2022    Chronic HFpEF/Moderate pHTN (HFpEF) ([facility]) 11/10/2022    Gross hematuria 2022    Platelets decreased ([facility]) 2022    Acute kidney injury superimposed on chronic kidney disease  ([facility]) 2022    Actinic keratoses 2022    Type 2 diabetes  mellitus with diabetic peripheral angiopathy without gangrene, with long-term current use of insulin ([facility]) 01/11/2022    Varicose veins of left lower extremity 06/25/2021    History of endovascular stent graft for abdominal aortic aneurysm (AAA) 06/25/2021    Bruit (arterial) 06/25/2021    Peripheral artery disease (HCC) 06/25/2021    Type 2 diabetes mellitus with diabetic polyneuropathy, with long-term current use of insulin ([facility]) 06/10/2021    Mixed hyperlipidemia 05/11/2021    Primary hypertension 05/11/2021    Coronary artery disease involving native coronary artery of native heart without angina pectoris 05/11/2021    S/P angioplasty with stent 05/11/2021    Hx of CABG 05/11/2021    S/P AAA repair 05/11/2021    S/P prostatectomy 05/11/2021    H/O prostate cancer 05/11/2021     No orders of the defined types were placed in this encounter.

## 2024-12-09 NOTE — PATIENT INSTRUCTIONS
Orders Placed This Encounter   Procedures    Wound cleansing and dressings     LEFT HEEL WOUND :  Wash your hands with soap and water.  Remove old dressing, discard into plastic bag and place in trash.  Cleanse the wound with soap and water prior to applying a clean dressing. Do not use tissue or cotton balls. Do not scrub the wound. Pat dry using gauze.  Shower: NO  Apply moisturizer to skin surrounding wound  Apply silver alginate to the heel wound.  Cover with ABD  Secure with rolled gauze and tape.  Change dressing every other day.     RIGHT TOE WOUND:  Wash your hands with soap and water.  Remove old dressing, discard into plastic bag and place in trash.  Cleanse the wound with soap and water prior to applying a clean dressing. Do not use tissue or cotton balls. Do not scrub the wound. Pat dry using gauze.  Shower: NO  Apply moisturizer to skin surrounding wound  Paint toe with betadine    Cover with gauze  Secure with rolled gauze and tape.  Change dressing daily      Off-loading Instructions:     Keep weight and pressure off wound at all times. and Wear off-loading OOFOS slides are ok as directed by your physician. Put on immediately when rising in the morning and remove when going to bed.      Wear off-loading device as directed by your physician (Globo Ped). Put on immediately when rising in the morning and remove when going to bed and Turn every 2 hours. Avoid position directing pressure to Wound site. Limit side lying to 30 degree tilt. Limit the head of bed elevation to 30 degrees. Limit walking and standing to only necessity for activities of daily living.      Wound infection: If you have signs of infection please call the wound center. If the wound center is closedplease go to the Emergency department. Some signs of infection: fever, chills, increased redness, red streaks, increase in pain, increased drainage. Drainage with an odor, Change in drainage color: white/milky/green/tan/yellow, an increase  in swelling, chest pain and/or shortness of breath.     Protein: Eat protein with each meal to promote healing. Examples of protein are fish, meat, chicken, nuts, peanut butter, eggs, lentils, edamame or a protein shake     Standing Status:   Future     Expiration Date:   12/16/2024

## 2024-12-09 NOTE — LETTER
[facility] WOUND CARE  [address] [address]  Phone#  [phone]  Fax#  [phone]    Patient:  Thomas Horne  YOB: 1945  Phone:  [phone]  Date of Visit:  12/9/2024    Orders Placed This Encounter   Procedures   • Wound cleansing and dressings     LEFT HEEL WOUND :  Wash your hands with soap and water.  Remove old dressing, discard into plastic bag and place in trash.  Cleanse the wound with soap and water prior to applying a clean dressing. Do not use tissue or cotton balls. Do not scrub the wound. Pat dry using gauze.  Shower: NO  Apply moisturizer to skin surrounding wound  Apply silver alginate to the heel wound.  Cover with ABD  Secure with rolled gauze and tape.  Change dressing every other day.     RIGHT TOE WOUND:  Wash your hands with soap and water.  Remove old dressing, discard into plastic bag and place in trash.  Cleanse the wound with soap and water prior to applying a clean dressing. Do not use tissue or cotton balls. Do not scrub the wound. Pat dry using gauze.  Shower: NO  Apply moisturizer to skin surrounding wound  Paint toe with betadine    Cover with gauze  Secure with rolled gauze and tape.  Change dressing daily      Off-loading Instructions:     Keep weight and pressure off wound at all times. and Wear off-loading OOFOS slides are ok as directed by your physician. Put on immediately when rising in the morning and remove when going to bed.      Wear off-loading device as directed by your physician (Globo Ped). Put on immediately when rising in the morning and remove when going to bed and Turn every 2 hours. Avoid position directing pressure to Wound site. Limit side lying to 30 degree tilt. Limit the head of bed elevation to 30 degrees. Limit walking and standing to only necessity for activities of daily living.      Wound infection: If you have signs of infection please call the wound center. If the wound center is closedplease go  to the Emergency department. Some signs of infection: fever, chills, increased redness, red streaks, increase in pain, increased drainage. Drainage with an odor, Change in drainage color: white/milky/green/tan/yellow, an increase in swelling, chest pain and/or shortness of breath.     Protein: Eat protein with each meal to promote healing. Examples of protein are fish, meat, chicken, nuts, peanut butter, eggs, lentils, edamame or a protein shake     Standing Status:   Future     Expiration Date:   12/16/2024         Electronically signed by Nitza Sotelo DO

## 2024-12-10 ENCOUNTER — OFFICE VISIT (OUTPATIENT)
Dept: WOUND CARE | Facility: HOSPITAL | Age: 79
DRG: 242 | End: 2024-12-10
Payer: COMMERCIAL

## 2024-12-10 ENCOUNTER — APPOINTMENT (OUTPATIENT)
Dept: LAB | Facility: HOSPITAL | Age: 79
End: 2024-12-10
Payer: COMMERCIAL

## 2024-12-10 ENCOUNTER — HOSPITAL ENCOUNTER (OUTPATIENT)
Dept: VASCULAR ULTRASOUND | Facility: HOSPITAL | Age: 79
Discharge: HOME | End: 2024-12-10
Payer: COMMERCIAL

## 2024-12-10 VITALS
RESPIRATION RATE: 18 BRPM | HEART RATE: 68 BPM | TEMPERATURE: 96.4 F | DIASTOLIC BLOOD PRESSURE: 51 MMHG | SYSTOLIC BLOOD PRESSURE: 152 MMHG

## 2024-12-10 DIAGNOSIS — E11.621 DIABETIC ULCER OF LEFT FOOT ASSOCIATED WITH TYPE 2 DIABETES MELLITUS, WITH MUSCLE INVOLVEMENT WITHOUT EVIDENCE OF NECROSIS, UNSPECIFIED PART OF FOOT (HCC): ICD-10-CM

## 2024-12-10 DIAGNOSIS — I73.9 PERIPHERAL ARTERY DISEASE (HCC): ICD-10-CM

## 2024-12-10 DIAGNOSIS — L97.525 DIABETIC ULCER OF LEFT FOOT ASSOCIATED WITH TYPE 2 DIABETES MELLITUS, WITH MUSCLE INVOLVEMENT WITHOUT EVIDENCE OF NECROSIS, UNSPECIFIED PART OF FOOT (HCC): ICD-10-CM

## 2024-12-10 LAB
GLUCOSE SERPL-MCNC: 203 MG/DL (ref 65–140)
GLUCOSE SERPL-MCNC: 237 MG/DL (ref 65–140)

## 2024-12-10 PROCEDURE — 82948 REAGENT STRIP/BLOOD GLUCOSE: CPT

## 2024-12-10 PROCEDURE — 99183 HYPERBARIC OXYGEN THERAPY: CPT | Performed by: PHYSICIAN ASSISTANT

## 2024-12-10 PROCEDURE — 93923 UPR/LXTR ART STDY 3+ LVLS: CPT

## 2024-12-10 PROCEDURE — G0277 HBOT, FULL BODY CHAMBER, 30M: HCPCS

## 2024-12-10 PROCEDURE — 93925 LOWER EXTREMITY STUDY: CPT

## 2024-12-10 PROCEDURE — 93925 LOWER EXTREMITY STUDY: CPT | Performed by: SURGERY

## 2024-12-10 PROCEDURE — 93922 UPR/L XTREMITY ART 2 LEVELS: CPT | Performed by: SURGERY

## 2024-12-10 NOTE — PROGRESS NOTES
Name: Thomas Horne      : 1945      MRN: [id number]  Encounter Provider: APOLLO Elmore  Encounter Date: 2024   Encounter department: [facility] [place]  :  Assessment & Plan  Peripheral artery disease (HCC)  80 yo male former smoker with HTN, HLD, CAD s/p CABG, AAA s/p EVAR, DM type II, CHF, pulmonary HTN, CKD, prostate CA s/p prostatectomy with urinary sphincter placement, provoked LLE DVT, and PAD s/p R CFA-peroneal bypass with reversed CryoVein 2023, s/p L distal SFA to BK pop bypass with PTFE 2024 ([facility]) presents with nonhealing right hallux wound.    -Presents with right nonhealing hallux wound, seen by wound care daily, HBO treatment for right hallux wound and left heel wound  -Left heel wound is progressing since bypass  -No change in R hallux wound  - Doppler L Dp/PT and bypas signal  -Non palp R  -Palpable left femoral pulse    LEAD 12/10/2024  RIGHT:  Evaluation shows evidence of high grade stenosis vs short segment occlusion of the dSFA.  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 The femoral to popliteal artery bypass graft could not be identified.  FABIOLA 0.47/-/-    LEFT:  50-75% stenosis noted at the tibioperoneal trunk/distal anastomosis of the distal superficial femoral- below knee popliteal bypass graft.  Evidence of peroneal artery occlusive disease. FABIOLA 0.79/105/51    Plan:  -Angiogram with RLE runoff and possible intervention vs diagnostic only  -CMP  -CKD, will message nephrology for clearance and hydration recommendations  -Continue aspirin, Plavix  -Continue statin  -Continue HBO and local wound care as prescribed  -Surgery scheduling aware    Orders:    IR aortagram with run-off; Future    Comprehensive metabolic panel; Future    Diabetic ulcer of toe of right foot associated with type 2 diabetes mellitus, limited to breakdown of skin (HCC)    Lab Results   Component Value Date    HGBA1C 6.2 (H) 10/14/2024    -Continue follow-up with wound care, HBO  -Local wound care as instructed    Orders:    Ambulatory Referral to Vascular Surgery    IR aortagram with run-off; Future    Comprehensive metabolic panel; Future      Operative Scheduling Information:    Hospital:  Any IR/[facility] suite    Physician:  Any other surgeon and Any IR doc    Surgery: abdominal angiogram with RLE runoff and possible intervention  vs diagnostic only    Urgency:  Standard    Level:  Level 2: Outpatients to be scheduled for surgery with time dependent medical necessity within 2 weeks    Case Length:  Normal    Post-op Bed:  Outpatient    OR Table:  IR    Equipment Needs:  None    Medication Instructions:  Aspirin:   Continue (do not hold)  Plavix:  Continue (do not hold)    Hydration:  Nephrology clearance/ recommendations     Contrast Allergy:  no    History of Present Illness   Cc:Patient presents to review NILS for eval of RLE wound. Patient currently goes to wound.  HPI  Thomas Horne is a 79 y.o. male who presents nonhealing right hallux wound to review LEAD    Patient presents with nonhealing right hallux wound.  He has history of extensive PAD and intervention most recently with left distal SFA to below-knee pop bypass for nonhealing left heel wound.  Since bypass, left heel is improving.  Patient's right hallux wound is nonhealing.    Imaging reviewed, recommend angiogram to better evaluate plus or minus intervention versus diagnostic only for future planning.  Patient has flatline GT pressure.    He is maintained on OMT with DAPT and statin    Patient has CKD, has upcoming nephrology appointment in January, will message nephrologist for hydration recommendations and planning angiogram with RLE runoff.    History obtained from: patient    Review of Systems   Constitutional: Negative.    HENT: Negative.     Eyes: Negative.    Respiratory: Negative.     Cardiovascular: Negative.    Gastrointestinal: Negative.    Endocrine: Negative.     Genitourinary: Negative.    Musculoskeletal: Negative.    Skin:  Positive for wound.   Allergic/Immunologic: Negative.    Neurological: Negative.    Hematological: Negative.    Psychiatric/Behavioral: Negative.     I have reviewed and made appropriate changes to the review of systems input by the medical assistant.    Medical History Reviewed by provider this encounter:  Tobacco  Allergies  Meds  Problems  Med Hx  Surg Hx  Fam Hx     .  Past Medical History   Past Medical History:   Diagnosis Date    Anemia     CAD (coronary artery disease)     Callus     Cancer (HCC)     prostate    CHF (congestive heart failure) (HCC)     Chronic kidney disease     Clotting disorder (HCC)     Coronary artery disease 1988    Deep vein thrombosis (HCC)     Diabetes mellitus (HCC)     Difficulty walking     Duodenal ulcer     Ear problems     Heart disease 1988    HL (hearing loss)     Hyperlipidemia     Hypertension     Myocardial infarction (HCC)     Neuropathy     Bilateral feet    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)     Plantar fasciitis     Sleep apnea     Could not tolerate CPAP     Past Surgical History:   Procedure Laterality Date    ABDOMINAL AORTIC ANEURYSM REPAIR      Stented    ADENOIDECTOMY      BACK SURGERY  1985    CARDIAC CATHETERIZATION Left 10/19/2022    Procedure: Cardiac Left Heart Cath;  Surgeon: Danielle Pereira MD;  Location: [facility];  Service: Cardiology    CARDIAC SURGERY  2002    3 cardiac bypass then angioplasty 7/2020    CHOLECYSTECTOMY      COLONOSCOPY  02/14/2024    CORONARY ARTERY BYPASS GRAFT      IR LOWER EXTREMITY ANGIOGRAM  11/01/2023    IR LOWER EXTREMITY ANGIOGRAM  08/07/2024    LAMINECTOMY  1990    MT BYPASS W/VEIN FEMORAL-POPLITEAL Right 11/16/2023    Procedure: BYPASS FEMORAL-POPLITEAL WITH CRYO VEIN, RIGHT FEMORAL ENDARTERECTOMY;  Surgeon: Vasquez Clark MD;  Location: [facility];  Service: Vascular    MT BYPASS W/VEIN FEMORAL-POPLITEAL Left 8/30/2024    Procedure:  left lower extremity above knee popliteal to below knee popliteal artery bypass with PTFE graft;  Surgeon: Vasquez Clark MD;  Location: [facility];  Service: Vascular    RI SLCTV CATHJ 3RD+ ORD SLCTV ABDL PEL/LXTR BRNCH Right 11/01/2023    Procedure: ARTERIOGRAM Right lower extremity arteriogram with CO2 via right groin access;  Surgeon: Vasquez Clark MD;  Location: [facility];  Service: Vascular    RI SLCTV CATHJ 3RD+ ORD SLCTV ABDL PEL/LXTR BRNCH Left 08/07/2024    Procedure: diagnostic LLE Arteriogram;  Surgeon: Vasquez Clark MD;  Location: [facility];  Service: Vascular    PROSTATE SURGERY      TONSILLECTOMY      URINARY SPHINCTER IMPLANT       Family History   Problem Relation Age of Onset    Diabetes Mother     Alcohol abuse Father     Heart disease Brother     Cancer Sister         Thyroid    Cancer Sister         Colon    Cancer Brother         Throat    Thyroid disease Brother     Cancer Brother     Diabetes Sister     Thyroid disease Sister     Mental illness Neg Hx       reports that he quit smoking about 36 years ago. His smoking use included cigarettes. He started smoking about 68 years ago. He has a 49.5 pack-year smoking history. He has been exposed to tobacco smoke. He has never used smokeless tobacco. He reports current alcohol use of about 5.0 standard drinks of alcohol per week. He reports that he does not use drugs.  Current Outpatient Medications on File Prior to Visit   Medication Sig Dispense Refill    acetaminophen (TYLENOL) 325 mg tablet Take 2 tablets (650 mg total) by mouth every 6 (six) hours as needed for mild pain      amLODIPine (NORVASC) 10 mg tablet Take 0.5 tablets (5 mg total) by mouth daily 45 tablet 1    aspirin 81 mg chewable tablet Chew 81 mg daily      atorvastatin (LIPITOR) 40 mg tablet TAKE 1 TABLET BY MOUTH DAILY 90 tablet 1    Blood Glucose Monitoring Suppl (OneTouch Verio Reflect) w/Device KIT Check blood sugars twice daily.  Please substitute with appropriate alternative as covered by patient's insurance. Dx: E11.65 1 kit 0    cloNIDine (CATAPRES) 0.1 mg tablet take 1 tablet by mouth every 12 hours 180 tablet 1    clopidogrel (PLAVIX) 75 mg tablet Take 1 tablet (75 mg total) by mouth daily 30 tablet 3    Droplet Pen Needles 32G X 4 MM MISC USE EVERY EVENING 100 each 5    DULoxetine (CYMBALTA) 30 mg delayed release capsule Take 1 capsule (30 mg total) by mouth daily 90 capsule 0    Empagliflozin (Jardiance) 25 MG TABS TAKE 1 TABLET BY MOUTH DAILY 90 tablet 1    fenofibrate 160 MG tablet TAKE 1 TABLET BY MOUTH DAILY 100 tablet 1    glucose blood (OneTouch Verio) test strip TEST TWICE A  strip 1    insulin glargine (LANTUS) 100 units/mL subcutaneous injection Inject 15 Units under the skin daily at bedtime 10 mL 0    isosorbide mononitrate (IMDUR) 30 mg 24 hr tablet TAKE 3 TABLETS BY MOUTH DAILY 270 tablet 1    metFORMIN (GLUCOPHAGE) 500 mg tablet TAKE 1 TABLET BY MOUTH TWICE  DAILY WITH MEALS 180 tablet 1    metoprolol tartrate (LOPRESSOR) 25 mg tablet Take 0.5 tablets (12.5 mg total) by mouth every 12 (twelve) hours 90 tablet 0    Multiple Vitamins-Minerals (MULTIVITAMIN MEN 50+ PO) Take by mouth daily      nitroglycerin (NITROSTAT) 0.4 mg SL tablet Place 1 tablet (0.4 mg total) under the tongue every 5 (five) minutes as needed for chest pain 30 tablet 0    Omega-3 Fatty Acids (fish oil) 1,000 mg Take 4,000 mg by mouth 2 (two) times a day      ondansetron (ZOFRAN) 8 mg tablet Take 0.5 tablets (4 mg total) by mouth every 6 (six) hours as needed for nausea or vomiting 20 tablet 0    OneTouch Delica Lancets 33G MISC Check blood sugars twice daily. Please substitute with appropriate alternative as covered by patient's insurance. Dx: E11.65 200 each 3    pantoprazole (PROTONIX) 40 mg tablet TAKE 1 TABLET BY MOUTH DAILY 90 tablet 1    pentoxifylline (TRENtal) 400 mg ER tablet TAKE 1 TABLET BY MOUTH 3 TIMES  DAILY WITH MEALS 270 tablet 3     pregabalin (LYRICA) 150 mg capsule Take 1 capsule (150 mg total) by mouth 3 (three) times a day 270 capsule 0    ranolazine (RANEXA) 1000 MG SR tablet Take 1 tablet (1,000 mg total) by mouth 2 (two) times a day 180 tablet 3    sodium hypochlorite (DAKIN'S HALF-STRENGTH) external solution Apply 1 Application topically every other day At each dressing change 473 mL 2    torsemide (DEMADEX) 20 mg tablet TAKE 1 TABLET BY MOUTH DAILY 90 tablet 1    ketoconazole (NIZORAL) 2 % cream Apply topically daily 60 g 1    [DISCONTINUED] metoprolol tartrate (LOPRESSOR) 50 mg tablet TAKE ONE-HALF TABLET BY MOUTH  EVERY 12 HOURS 90 tablet 1     No current facility-administered medications on file prior to visit.     Allergies   Allergen Reactions    Lisinopril Rash and Lip Swelling      Current Outpatient Medications on File Prior to Visit   Medication Sig Dispense Refill    acetaminophen (TYLENOL) 325 mg tablet Take 2 tablets (650 mg total) by mouth every 6 (six) hours as needed for mild pain      amLODIPine (NORVASC) 10 mg tablet Take 0.5 tablets (5 mg total) by mouth daily 45 tablet 1    aspirin 81 mg chewable tablet Chew 81 mg daily      atorvastatin (LIPITOR) 40 mg tablet TAKE 1 TABLET BY MOUTH DAILY 90 tablet 1    Blood Glucose Monitoring Suppl (OneTouch Verio Reflect) w/Device KIT Check blood sugars twice daily. Please substitute with appropriate alternative as covered by patient's insurance. Dx: E11.65 1 kit 0    cloNIDine (CATAPRES) 0.1 mg tablet take 1 tablet by mouth every 12 hours 180 tablet 1    clopidogrel (PLAVIX) 75 mg tablet Take 1 tablet (75 mg total) by mouth daily 30 tablet 3    Droplet Pen Needles 32G X 4 MM MISC USE EVERY EVENING 100 each 5    DULoxetine (CYMBALTA) 30 mg delayed release capsule Take 1 capsule (30 mg total) by mouth daily 90 capsule 0    Empagliflozin (Jardiance) 25 MG TABS TAKE 1 TABLET BY MOUTH DAILY 90 tablet 1    fenofibrate 160 MG tablet TAKE 1 TABLET BY MOUTH DAILY 100 tablet 1     glucose blood (OneTouch Verio) test strip TEST TWICE A  strip 1    insulin glargine (LANTUS) 100 units/mL subcutaneous injection Inject 15 Units under the skin daily at bedtime 10 mL 0    isosorbide mononitrate (IMDUR) 30 mg 24 hr tablet TAKE 3 TABLETS BY MOUTH DAILY 270 tablet 1    metFORMIN (GLUCOPHAGE) 500 mg tablet TAKE 1 TABLET BY MOUTH TWICE  DAILY WITH MEALS 180 tablet 1    metoprolol tartrate (LOPRESSOR) 25 mg tablet Take 0.5 tablets (12.5 mg total) by mouth every 12 (twelve) hours 90 tablet 0    Multiple Vitamins-Minerals (MULTIVITAMIN MEN 50+ PO) Take by mouth daily      nitroglycerin (NITROSTAT) 0.4 mg SL tablet Place 1 tablet (0.4 mg total) under the tongue every 5 (five) minutes as needed for chest pain 30 tablet 0    Omega-3 Fatty Acids (fish oil) 1,000 mg Take 4,000 mg by mouth 2 (two) times a day      ondansetron (ZOFRAN) 8 mg tablet Take 0.5 tablets (4 mg total) by mouth every 6 (six) hours as needed for nausea or vomiting 20 tablet 0    OneTouch Delica Lancets 33G MISC Check blood sugars twice daily. Please substitute with appropriate alternative as covered by patient's insurance. Dx: E11.65 200 each 3    pantoprazole (PROTONIX) 40 mg tablet TAKE 1 TABLET BY MOUTH DAILY 90 tablet 1    pentoxifylline (TRENtal) 400 mg ER tablet TAKE 1 TABLET BY MOUTH 3 TIMES  DAILY WITH MEALS 270 tablet 3    pregabalin (LYRICA) 150 mg capsule Take 1 capsule (150 mg total) by mouth 3 (three) times a day 270 capsule 0    ranolazine (RANEXA) 1000 MG SR tablet Take 1 tablet (1,000 mg total) by mouth 2 (two) times a day 180 tablet 3    sodium hypochlorite (DAKIN'S HALF-STRENGTH) external solution Apply 1 Application topically every other day At each dressing change 473 mL 2    torsemide (DEMADEX) 20 mg tablet TAKE 1 TABLET BY MOUTH DAILY 90 tablet 1    ketoconazole (NIZORAL) 2 % cream Apply topically daily 60 g 1    [DISCONTINUED] metoprolol tartrate (LOPRESSOR) 50 mg tablet TAKE ONE-HALF TABLET BY MOUTH  EVERY 12  "HOURS 90 tablet 1     No current facility-administered medications on file prior to visit.      Social History     Tobacco Use    Smoking status: Former     Current packs/day: 0.00     Average packs/day: 1.5 packs/day for 33.0 years (49.5 ttl pk-yrs)     Types: Cigarettes     Start date: 1956     Quit date:      Years since quittin.9     Passive exposure: Past    Smokeless tobacco: Never   Vaping Use    Vaping status: Never Used   Substance and Sexual Activity    Alcohol use: Yes     Alcohol/week: 5.0 standard drinks of alcohol     Types: 5 Cans of beer per week     Comment: stopped last few months    Drug use: Never    Sexual activity: Not Currently     Partners: Female     Birth control/protection: Male Sterilization        Objective   /60 (BP Location: Left arm, Patient Position: Sitting, Cuff Size: Standard)   Pulse 68   Ht 6' 2\" (1.88 m)   Wt 107 kg (236 lb)   BMI 30.30 kg/m²      Physical Exam  Vitals reviewed.   Constitutional:       General: He is not in acute distress.     Appearance: Normal appearance. He is obese.   Cardiovascular:      Rate and Rhythm: Normal rate.      Pulses:           Radial pulses are 2+ on the right side and 2+ on the left side.        Femoral pulses are 2+ on the left side.       Dorsalis pedis pulses are 0 on the right side and detected w/ Doppler on the left side.        Posterior tibial pulses are 0 on the right side and detected w/ Doppler on the left side.   Pulmonary:      Effort: Pulmonary effort is normal. No respiratory distress.   Musculoskeletal:         General: Normal range of motion.      Right lower leg: No edema.      Left lower leg: No edema.   Skin:     General: Skin is warm.      Comments: Right hallux and left heel wound, pictures in media   Neurological:      General: No focal deficit present.      Mental Status: He is alert and oriented to person, place, and time.      Sensory: Sensory deficit present.      Motor: No weakness. "         Administrative Statements   I have spent a total time of 20 minutes in caring for this patient on the day of the visit/encounter including Diagnostic results, Prognosis, Risks and benefits of tx options, Instructions for management, Patient and family education, Impressions, Documenting in the medical record, Reviewing / ordering tests, medicine, procedures  , and Obtaining or reviewing history  .

## 2024-12-10 NOTE — PROGRESS NOTES
HBO Treatment Course Details       Treatment Notes: Treatment tolerated well no complaints of pain or discomfort     Treatment Course Number: 28  Total Treatments Ordered:  40     Diagnosis:   1. Diabetic ulcer of left foot associated with type 2 diabetes mellitus, with muscle involvement without evidence of necrosis, unspecified part of foot (HCC)  Hyperbaric oxygen [place]          HBO Treatment Details:  In-Patient Visit: no  Treatment Length:90 Minutes(Minutes)  Chamber #: Hard sided Monoplace Chamber    Pre-Treatment details:  Pre-treatment protocol Treatment Protocol: 2.5 INDY X 90 minutes w/ 100% oxygen, two 5 minute air breaks  Left ear clear?: yes  Right ear clear?: yes  Left ear intact?: yes  Right ear intact?: yes        PE Tubes present, Left ear?: yes  PE Tubes present, Right ear?: yes  Left ear irrigated?: no  Right ear irrigated?: no  Left ear TEED scale: Grade 0  Right ear TEED scale: Grade 0   Pretreatment heart and lung assessment: Pretreatment heart and lung auscltation unremarkable. Patient cleared for HBOT     Treatment details:  INDY Rate: 2.5  Started Compression: 0826  Reached Compression: 0836  Total Compression Time: 10 (Minutes)  Total Holding Time: 100 (Minutes)  Started Decompression: 1016  Reached Surface: 1026  Total Decompression Time: 10 (Minutes)  Total Airbreaks:   (Minutes)  Total Time of Treatment: 120 (Minutes)  Symptoms Noted During Treatment: None (Minutes)    Post treatment details:  Left ear clear?: yes  Right ear clear?: yes  Left ears intact?: yes  Right ears intact?: yes        PE Tubes present, Left ear?: yes  PE Tubes present, Right ear?: yes        Left ear TEED scale: Grade 0  Right ear TEED scale: Grade 0  Post treatment heart and lung assessment: Post treatment heart and lung auscltation unremarkable. Patient cleared for discharge. Tolerated treatment well.             Vital Signs:  HBO Glucose Reference Range: 100-350 mg/dl   Pre-Treatment Post-Treatment   Time  vitals are taken: 0800 Time vitals are taken: 1030   Blood Pressure: 152/51 Blood Pressure: 99/64   Pulse: 68 Pulse: 81   Resp: 18 Resp: 16   Temp: 96.4 °F (35.8 °C) Temp: 96.3 °F (35.7 °C)   Pre-Inspection Glucose readin Post-Inspection Glucose readin       Allergies   Allergen Reactions    Lisinopril Rash and Lip Swelling     Patient Active Problem List    Diagnosis Date Noted    Foot ulcer (HCC) 2024    SOB (shortness of breath) 2024    Nausea 2024    Elevated troponin 2024    History of DVT (deep vein thrombosis) 2024    Diabetic ulcer of toe of left foot associated with type 2 diabetes mellitus, limited to breakdown of skin ([facility]) 2024    Plantar fasciitis, right 07/10/2024    Acute deep vein thrombosis (DVT) of left peroneal vein ([facility]) 2024    Iron deficiency anemia 2024    Shortness of breath 2024    History of colon polyps 2024    Duodenal ulcer 2024    Multiple gastric ulcers 2023    Iron deficiency anemia due to chronic blood loss 2023    Melena 2023    Symptomatic anemia 2023    Frequent PVCs 2023    Acute on chronic diastolic heart failure (HCC) 2023    Stage 3b chronic kidney disease ([facility]) 2023    Diabetic ulcer of right midfoot associated with diabetes mellitus due to underlying condition, limited to breakdown of skin ([facility]) 06/15/2023    Hypertensive urgency 2023    Elevated troponin level not due myocardial infarction 2023    Stable angina pectoris ([facility]) 2023    Chronic kidney disease-mineral and bone disorder 2022    Mild aortic stenosis 2022    Chronic HFpEF/Moderate pHTN (HFpEF) ([facility]) 11/10/2022    Gross hematuria 2022    Platelets decreased ([facility]) 2022    Acute kidney injury superimposed on chronic kidney disease  ([facility]) 2022    Actinic keratoses 2022    Type 2 diabetes mellitus with diabetic peripheral angiopathy without  gangrene, with long-term current use of insulin ([facility]) 01/11/2022    Varicose veins of left lower extremity 06/25/2021    History of endovascular stent graft for abdominal aortic aneurysm (AAA) 06/25/2021    Bruit (arterial) 06/25/2021    Peripheral artery disease (HCC) 06/25/2021    Type 2 diabetes mellitus with diabetic polyneuropathy, with long-term current use of insulin ([facility]) 06/10/2021    Mixed hyperlipidemia 05/11/2021    Primary hypertension 05/11/2021    Coronary artery disease involving native coronary artery of native heart without angina pectoris 05/11/2021    S/P angioplasty with stent 05/11/2021    Hx of CABG 05/11/2021    S/P AAA repair 05/11/2021    S/P prostatectomy 05/11/2021    H/O prostate cancer 05/11/2021     No orders of the defined types were placed in this encounter.

## 2024-12-11 ENCOUNTER — OFFICE VISIT (OUTPATIENT)
Dept: VASCULAR SURGERY | Facility: CLINIC | Age: 79
End: 2024-12-11
Payer: COMMERCIAL

## 2024-12-11 ENCOUNTER — OFFICE VISIT (OUTPATIENT)
Age: 79
End: 2024-12-11
Payer: COMMERCIAL

## 2024-12-11 ENCOUNTER — TELEPHONE (OUTPATIENT)
Dept: VASCULAR SURGERY | Facility: CLINIC | Age: 79
End: 2024-12-11

## 2024-12-11 ENCOUNTER — OFFICE VISIT (OUTPATIENT)
Dept: WOUND CARE | Facility: HOSPITAL | Age: 79
DRG: 242 | End: 2024-12-11
Payer: COMMERCIAL

## 2024-12-11 VITALS
SYSTOLIC BLOOD PRESSURE: 116 MMHG | DIASTOLIC BLOOD PRESSURE: 60 MMHG | HEIGHT: 74 IN | BODY MASS INDEX: 30.29 KG/M2 | HEART RATE: 68 BPM | WEIGHT: 236 LBS

## 2024-12-11 VITALS
SYSTOLIC BLOOD PRESSURE: 142 MMHG | HEART RATE: 73 BPM | DIASTOLIC BLOOD PRESSURE: 66 MMHG | TEMPERATURE: 97.4 F | RESPIRATION RATE: 18 BRPM

## 2024-12-11 VITALS — BODY MASS INDEX: 30.29 KG/M2 | HEIGHT: 74 IN | RESPIRATION RATE: 18 BRPM | WEIGHT: 236 LBS

## 2024-12-11 DIAGNOSIS — M77.42 METATARSALGIA OF BOTH FEET: Primary | ICD-10-CM

## 2024-12-11 DIAGNOSIS — I73.9 PERIPHERAL ARTERY DISEASE (HCC): Primary | ICD-10-CM

## 2024-12-11 DIAGNOSIS — E11.621 DIABETIC ULCER OF LEFT FOOT ASSOCIATED WITH TYPE 2 DIABETES MELLITUS, WITH MUSCLE INVOLVEMENT WITHOUT EVIDENCE OF NECROSIS, UNSPECIFIED PART OF FOOT (HCC): ICD-10-CM

## 2024-12-11 DIAGNOSIS — I70.209 PERIPHERAL ARTERIOSCLEROSIS (HCC): ICD-10-CM

## 2024-12-11 DIAGNOSIS — B35.1 ONYCHOMYCOSIS: ICD-10-CM

## 2024-12-11 DIAGNOSIS — E11.42 DIABETIC POLYNEUROPATHY ASSOCIATED WITH TYPE 2 DIABETES MELLITUS (HCC): ICD-10-CM

## 2024-12-11 DIAGNOSIS — L97.511 DIABETIC ULCER OF TOE OF RIGHT FOOT ASSOCIATED WITH TYPE 2 DIABETES MELLITUS, LIMITED TO BREAKDOWN OF SKIN (HCC): ICD-10-CM

## 2024-12-11 DIAGNOSIS — L97.421 DIABETIC ULCER OF LEFT HEEL ASSOCIATED WITH TYPE 2 DIABETES MELLITUS, LIMITED TO BREAKDOWN OF SKIN (HCC): ICD-10-CM

## 2024-12-11 DIAGNOSIS — E11.621 DIABETIC ULCER OF TOE OF RIGHT FOOT ASSOCIATED WITH TYPE 2 DIABETES MELLITUS, LIMITED TO BREAKDOWN OF SKIN (HCC): ICD-10-CM

## 2024-12-11 DIAGNOSIS — L97.525 DIABETIC ULCER OF LEFT FOOT ASSOCIATED WITH TYPE 2 DIABETES MELLITUS, WITH MUSCLE INVOLVEMENT WITHOUT EVIDENCE OF NECROSIS, UNSPECIFIED PART OF FOOT (HCC): ICD-10-CM

## 2024-12-11 DIAGNOSIS — E11.621 DIABETIC ULCER OF LEFT HEEL ASSOCIATED WITH TYPE 2 DIABETES MELLITUS, LIMITED TO BREAKDOWN OF SKIN (HCC): ICD-10-CM

## 2024-12-11 DIAGNOSIS — M77.41 METATARSALGIA OF BOTH FEET: Primary | ICD-10-CM

## 2024-12-11 LAB — GLUCOSE SERPL-MCNC: 267 MG/DL (ref 65–140)

## 2024-12-11 PROCEDURE — G0277 HBOT, FULL BODY CHAMBER, 30M: HCPCS

## 2024-12-11 PROCEDURE — 99213 OFFICE O/P EST LOW 20 MIN: CPT | Performed by: NURSE PRACTITIONER

## 2024-12-11 PROCEDURE — 99214 OFFICE O/P EST MOD 30 MIN: CPT | Performed by: PODIATRIST

## 2024-12-11 PROCEDURE — 82948 REAGENT STRIP/BLOOD GLUCOSE: CPT

## 2024-12-11 NOTE — TELEPHONE ENCOUNTER
Operative Scheduling Information:     Hospital:  Any IR/endo suite     Physician:  Any other surgeon and Any IR doc     Surgery: abdominal angiogram with RLE runoff and possible intervention  vs diagnostic only     Urgency:  Standard     Level:  Level 2: Outpatients to be scheduled for surgery with time dependent medical necessity within 2 weeks     Case Length:  Normal     Post-op Bed:  Outpatient     OR Table:  IR     Equipment Needs:  None     Medication Instructions:  Aspirin:   Continue (do not hold)  Plavix:  Continue (do not hold)     Hydration:  Nephrology clearance/ recommendations      Contrast Allergy:  no

## 2024-12-11 NOTE — ASSESSMENT & PLAN NOTE
78 yo male former smoker with HTN, HLD, CAD s/p CABG, AAA s/p EVAR, DM type II, CHF, pulmonary HTN, CKD, prostate CA s/p prostatectomy with urinary sphincter placement, provoked LLE DVT, and PAD s/p R CFA-peroneal bypass with reversed CryoVein 11/16/2023, s/p L distal SFA to BK pop bypass with PTFE 8/30/2024 ([facility]) presents with nonhealing right hallux wound.    -Presents with right nonhealing hallux wound, seen by wound care daily, HBO treatment for right hallux wound and left heel wound  -Left heel wound is progressing since bypass  -No change in R hallux wound  - Doppler L Dp/PT and bypas signal  -Non palp R  -Palpable left femoral pulse    LEAD 12/10/2024  RIGHT:  Evaluation shows evidence of high grade stenosis vs short segment occlusion of the dSFA.  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 The femoral to popliteal artery bypass graft could not be identified.  FABIOLA 0.47/-/-    LEFT:  50-75% stenosis noted at the tibioperoneal trunk/distal anastomosis of the distal superficial femoral- below knee popliteal bypass graft.  Evidence of peroneal artery occlusive disease. FABIOLA 0.79/105/51    Plan:  -Angiogram with RLE runoff and possible intervention vs diagnostic only  -CMP  -CKD, will message nephrology for clearance and hydration recommendations  -Continue aspirin, Plavix  -Continue statin  -Continue HBO and local wound care as prescribed  -Surgery scheduling aware    Orders:    IR aortagram with run-off; Future    Comprehensive metabolic panel; Future

## 2024-12-11 NOTE — LETTER
2024     Jose Fischer MD  [address], [address]  [facility] [address]    Patient: Thomas Horne   YOB: 1945   Date of Visit: 2024       Dear Dr. Fischer:    Thank you for referring Thomas Horne to me for evaluation. Below are my notes for this consultation.    If you have questions, please do not hesitate to call me. I look forward to following your patient along with you.         Sincerely,        APOLLO Elmore        CC: No Recipients    APOLLO Elmore  2024  1:04 PM  Sign when Signing Visit  Name: Thomas Horne      : 1945      MRN: [id number]  Encounter Provider: APOLLO Elmore  Encounter Date: 2024   Enc[facility]: [facility] [place]  :  Assessment & Plan  Peripheral artery disease (HCC)  78 yo male former smoker with HTN, HLD, CAD s/p CABG, AAA s/p EVAR, DM type II, CHF, pulmonary HTN, CKD, prostate CA s/p prostatectomy with urinary sphincter placement, provoked LLE DVT, and PAD s/p R CFA-peroneal bypass with reversed CryoVein 2023, s/p L distal SFA to BK pop bypass with PTFE 2024 ([facility]) presents with nonhealing right hallux wound.    -Presents with right nonhealing hallux wound, seen by wound care daily, HBO treatment for right hallux wound and left heel wound  -Left heel wound is progressing since bypass  -No change in R hallux wound  - Doppler L Dp/PT and bypas signal  -Non palp R  -Palpable left femoral pulse    LEAD 12/10/2024  RIGHT:  Evaluation shows evidence of high grade stenosis vs short segment occlusion of the dSFA.  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 The femoral to popliteal artery bypass graft could not be identified.  FABIOLA 0.47/-/-    LEFT:  50-75% stenosis noted at the tibioperoneal trunk/distal anastomosis of the distal superficial femoral- below knee popliteal bypass graft.  Evidence of peroneal artery occlusive  disease. FABIOLA 0.79/105/51    Plan:  -Angiogram with RLE runoff and possible intervention vs diagnostic only  -CMP  -CKD, will message nephrology for clearance and recommendations  -Continue aspirin, Plavix  -Continue statin  -Continue HBO and local wound care as prescribed    Orders:  •  IR aortagram with run-off; Future  •  Comprehensive metabolic panel; Future    Diabetic ulcer of toe of right foot associated with type 2 diabetes mellitus, limited to breakdown of skin (HCC)    Lab Results   Component Value Date    HGBA1C 6.2 (H) 10/14/2024   -Continue follow-up with wound care, HBO  -Local wound care as instructed    Orders:  •  Ambulatory Referral to Vascular Surgery  •  IR aortagram with run-off; Future  •  Comprehensive metabolic panel; Future      Operative Scheduling Information:    Hospital:  Any IR/endo suite    Physician:  Any other surgeon and Any IR doc    Surgery: abdominal angiogram with RLE runoff and possible intervention  vs diagnostic only    Urgency:  Standard    Level:  Level 2: Outpatients to be scheduled for surgery with time dependent medical necessity within 2 weeks    Case Length:  Normal    Post-op Bed:  Outpatient    OR Table:  IR    Equipment Needs:  None    Medication Instructions:  Aspirin:   Continue (do not hold)  Plavix:  Continue (do not hold)    Hydration:  Nephrology clearance/ recommendations     Contrast Allergy:  no    History of Present Illness  Cc:Patient presents to review NILS for eval of RLE wound. Patient currently goes to wound.  HPI  Thomas Horne is a 79 y.o. male who presents nonhealing right hallux wound to review LEAD    Patient presents with nonhealing right hallux wound.  He has history of extensive PAD and intervention most recently with left distal SFA to below-knee pop bypass for nonhealing left heel wound.  Since bypass, left heel is improving.  Patient's right hallux wound is nonhealing.    Imaging reviewed, recommend angiogram to better evaluate plus or minus  intervention versus diagnostic only for future planning.  Patient has flatline GT pressure.    He is maintained on OMT with DAPT and statin    Patient has CKD, has upcoming nephrology appointment in January, will message nephrologist for hydration recommendations and planning angiogram with RLE runoff.    History obtained from: patient    Review of Systems   Constitutional: Negative.    HENT: Negative.     Eyes: Negative.    Respiratory: Negative.     Cardiovascular: Negative.    Gastrointestinal: Negative.    Endocrine: Negative.    Genitourinary: Negative.    Musculoskeletal: Negative.    Skin:  Positive for wound.   Allergic/Immunologic: Negative.    Neurological: Negative.    Hematological: Negative.    Psychiatric/Behavioral: Negative.     I have reviewed and made appropriate changes to the review of systems input by the medical assistant.    Medical History Reviewed by provider this encounter:  Tobacco  Allergies  Meds  Problems  Med Hx  Surg Hx  Fam Hx     .  Past Medical History  Past Medical History:   Diagnosis Date   • Anemia    • CAD (coronary artery disease)    • Callus    • Cancer (HCC)     prostate   • CHF (congestive heart failure) (HCC)    • Chronic kidney disease    • Clotting disorder (HCC)    • Coronary artery disease 1988   • Deep vein thrombosis (HCC)    • Diabetes mellitus (HCC)    • Difficulty walking    • Duodenal ulcer    • Ear problems    • Heart disease 1988   • HL (hearing loss)    • Hyperlipidemia    • Hypertension    • Myocardial infarction (HCC)    • Neuropathy     Bilateral feet   • 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)    • Plantar fasciitis    • Sleep apnea     Could not tolerate CPAP     Past Surgical History:   Procedure Laterality Date   • ABDOMINAL AORTIC ANEURYSM REPAIR      Stented   • ADENOIDECTOMY     • BACK SURGERY  1985   • CARDIAC CATHETERIZATION Left 10/19/2022    Procedure: Cardiac Left Heart Cath;  Surgeon: Danielle Pereira MD;  Location: [facility];  Service:  Cardiology   • CARDIAC SURGERY  2002    3 cardiac bypass then angioplasty 7/2020   • CHOLECYSTECTOMY     • COLONOSCOPY  02/14/2024   • CORONARY ARTERY BYPASS GRAFT     • IR LOWER EXTREMITY ANGIOGRAM  11/01/2023   • IR LOWER EXTREMITY ANGIOGRAM  08/07/2024   • LAMINECTOMY  1990   • TX BYPASS W/VEIN FEMORAL-POPLITEAL Right 11/16/2023    Procedure: BYPASS FEMORAL-POPLITEAL WITH CRYO VEIN, RIGHT FEMORAL ENDARTERECTOMY;  Surgeon: Vasquez Clark MD;  Location: [facility];  Service: Vascular   • TX BYPASS W/VEIN FEMORAL-POPLITEAL Left 8/30/2024    Procedure: left lower extremity above knee popliteal to below knee popliteal artery bypass with PTFE graft;  Surgeon: Vasquez Clark MD;  Location: [facility];  Service: Vascular   • TX SLCTV CATHJ 3RD+ ORD SLCTV ABDL PEL/LXTR BRNCH Right 11/01/2023    Procedure: ARTERIOGRAM Right lower extremity arteriogram with CO2 via right groin access;  Surgeon: Vasquez Clark MD;  Location: [facility];  Service: Vascular   • TX SLCTV CATHJ 3RD+ ORD SLCTV ABDL PEL/LXTR BRNCH Left 08/07/2024    Procedure: diagnostic LLE Arteriogram;  Surgeon: Vasquez Clark MD;  Location: [facility];  Service: Vascular   • PROSTATE SURGERY     • TONSILLECTOMY     • URINARY SPHINCTER IMPLANT       Family History   Problem Relation Age of Onset   • Diabetes Mother    • Alcohol abuse Father    • Heart disease Brother    • Cancer Sister         Thyroid   • Cancer Sister         Colon   • Cancer Brother         Throat   • Thyroid disease Brother    • Cancer Brother    • Diabetes Sister    • Thyroid disease Sister    • Mental illness Neg Hx       reports that he quit smoking about 36 years ago. His smoking use included cigarettes. He started smoking about 68 years ago. He has a 49.5 pack-year smoking history. He has been exposed to tobacco smoke. He has never used smokeless tobacco. He reports current alcohol use of about 5.0 standard drinks of alcohol per week.  He reports that he does not use drugs.  Current Outpatient Medications on File Prior to Visit   Medication Sig Dispense Refill   • acetaminophen (TYLENOL) 325 mg tablet Take 2 tablets (650 mg total) by mouth every 6 (six) hours as needed for mild pain     • amLODIPine (NORVASC) 10 mg tablet Take 0.5 tablets (5 mg total) by mouth daily 45 tablet 1   • aspirin 81 mg chewable tablet Chew 81 mg daily     • atorvastatin (LIPITOR) 40 mg tablet TAKE 1 TABLET BY MOUTH DAILY 90 tablet 1   • Blood Glucose Monitoring Suppl (OneTouch Verio Reflect) w/Device KIT Check blood sugars twice daily. Please substitute with appropriate alternative as covered by patient's insurance. Dx: E11.65 1 kit 0   • cloNIDine (CATAPRES) 0.1 mg tablet take 1 tablet by mouth every 12 hours 180 tablet 1   • clopidogrel (PLAVIX) 75 mg tablet Take 1 tablet (75 mg total) by mouth daily 30 tablet 3   • Droplet Pen Needles 32G X 4 MM MISC USE EVERY EVENING 100 each 5   • DULoxetine (CYMBALTA) 30 mg delayed release capsule Take 1 capsule (30 mg total) by mouth daily 90 capsule 0   • Empagliflozin (Jardiance) 25 MG TABS TAKE 1 TABLET BY MOUTH DAILY 90 tablet 1   • fenofibrate 160 MG tablet TAKE 1 TABLET BY MOUTH DAILY 100 tablet 1   • glucose blood (OneTouch Verio) test strip TEST TWICE A  strip 1   • insulin glargine (LANTUS) 100 units/mL subcutaneous injection Inject 15 Units under the skin daily at bedtime 10 mL 0   • isosorbide mononitrate (IMDUR) 30 mg 24 hr tablet TAKE 3 TABLETS BY MOUTH DAILY 270 tablet 1   • metFORMIN (GLUCOPHAGE) 500 mg tablet TAKE 1 TABLET BY MOUTH TWICE  DAILY WITH MEALS 180 tablet 1   • metoprolol tartrate (LOPRESSOR) 25 mg tablet Take 0.5 tablets (12.5 mg total) by mouth every 12 (twelve) hours 90 tablet 0   • Multiple Vitamins-Minerals (MULTIVITAMIN MEN 50+ PO) Take by mouth daily     • nitroglycerin (NITROSTAT) 0.4 mg SL tablet Place 1 tablet (0.4 mg total) under the tongue every 5 (five) minutes as needed for chest  pain 30 tablet 0   • Omega-3 Fatty Acids (fish oil) 1,000 mg Take 4,000 mg by mouth 2 (two) times a day     • ondansetron (ZOFRAN) 8 mg tablet Take 0.5 tablets (4 mg total) by mouth every 6 (six) hours as needed for nausea or vomiting 20 tablet 0   • OneTouch Delica Lancets 33G MISC Check blood sugars twice daily. Please substitute with appropriate alternative as covered by patient's insurance. Dx: E11.65 200 each 3   • pantoprazole (PROTONIX) 40 mg tablet TAKE 1 TABLET BY MOUTH DAILY 90 tablet 1   • pentoxifylline (TRENtal) 400 mg ER tablet TAKE 1 TABLET BY MOUTH 3 TIMES  DAILY WITH MEALS 270 tablet 3   • pregabalin (LYRICA) 150 mg capsule Take 1 capsule (150 mg total) by mouth 3 (three) times a day 270 capsule 0   • ranolazine (RANEXA) 1000 MG SR tablet Take 1 tablet (1,000 mg total) by mouth 2 (two) times a day 180 tablet 3   • sodium hypochlorite (DAKIN'S HALF-STRENGTH) external solution Apply 1 Application topically every other day At each dressing change 473 mL 2   • torsemide (DEMADEX) 20 mg tablet TAKE 1 TABLET BY MOUTH DAILY 90 tablet 1   • ketoconazole (NIZORAL) 2 % cream Apply topically daily 60 g 1   • [DISCONTINUED] metoprolol tartrate (LOPRESSOR) 50 mg tablet TAKE ONE-HALF TABLET BY MOUTH  EVERY 12 HOURS 90 tablet 1     No current facility-administered medications on file prior to visit.     Allergies   Allergen Reactions   • Lisinopril Rash and Lip Swelling      Current Outpatient Medications on File Prior to Visit   Medication Sig Dispense Refill   • acetaminophen (TYLENOL) 325 mg tablet Take 2 tablets (650 mg total) by mouth every 6 (six) hours as needed for mild pain     • amLODIPine (NORVASC) 10 mg tablet Take 0.5 tablets (5 mg total) by mouth daily 45 tablet 1   • aspirin 81 mg chewable tablet Chew 81 mg daily     • atorvastatin (LIPITOR) 40 mg tablet TAKE 1 TABLET BY MOUTH DAILY 90 tablet 1   • Blood Glucose Monitoring Suppl (OneTouch Verio Reflect) w/Device KIT Check blood sugars twice daily.  Please substitute with appropriate alternative as covered by patient's insurance. Dx: E11.65 1 kit 0   • cloNIDine (CATAPRES) 0.1 mg tablet take 1 tablet by mouth every 12 hours 180 tablet 1   • clopidogrel (PLAVIX) 75 mg tablet Take 1 tablet (75 mg total) by mouth daily 30 tablet 3   • Droplet Pen Needles 32G X 4 MM MISC USE EVERY EVENING 100 each 5   • DULoxetine (CYMBALTA) 30 mg delayed release capsule Take 1 capsule (30 mg total) by mouth daily 90 capsule 0   • Empagliflozin (Jardiance) 25 MG TABS TAKE 1 TABLET BY MOUTH DAILY 90 tablet 1   • fenofibrate 160 MG tablet TAKE 1 TABLET BY MOUTH DAILY 100 tablet 1   • glucose blood (OneTouch Verio) test strip TEST TWICE A  strip 1   • insulin glargine (LANTUS) 100 units/mL subcutaneous injection Inject 15 Units under the skin daily at bedtime 10 mL 0   • isosorbide mononitrate (IMDUR) 30 mg 24 hr tablet TAKE 3 TABLETS BY MOUTH DAILY 270 tablet 1   • metFORMIN (GLUCOPHAGE) 500 mg tablet TAKE 1 TABLET BY MOUTH TWICE  DAILY WITH MEALS 180 tablet 1   • metoprolol tartrate (LOPRESSOR) 25 mg tablet Take 0.5 tablets (12.5 mg total) by mouth every 12 (twelve) hours 90 tablet 0   • Multiple Vitamins-Minerals (MULTIVITAMIN MEN 50+ PO) Take by mouth daily     • nitroglycerin (NITROSTAT) 0.4 mg SL tablet Place 1 tablet (0.4 mg total) under the tongue every 5 (five) minutes as needed for chest pain 30 tablet 0   • Omega-3 Fatty Acids (fish oil) 1,000 mg Take 4,000 mg by mouth 2 (two) times a day     • ondansetron (ZOFRAN) 8 mg tablet Take 0.5 tablets (4 mg total) by mouth every 6 (six) hours as needed for nausea or vomiting 20 tablet 0   • OneTouch Delica Lancets 33G MISC Check blood sugars twice daily. Please substitute with appropriate alternative as covered by patient's insurance. Dx: E11.65 200 each 3   • pantoprazole (PROTONIX) 40 mg tablet TAKE 1 TABLET BY MOUTH DAILY 90 tablet 1   • pentoxifylline (TRENtal) 400 mg ER tablet TAKE 1 TABLET BY MOUTH 3 TIMES  DAILY WITH  "MEALS 270 tablet 3   • pregabalin (LYRICA) 150 mg capsule Take 1 capsule (150 mg total) by mouth 3 (three) times a day 270 capsule 0   • ranolazine (RANEXA) 1000 MG SR tablet Take 1 tablet (1,000 mg total) by mouth 2 (two) times a day 180 tablet 3   • sodium hypochlorite (DAKIN'S HALF-STRENGTH) external solution Apply 1 Application topically every other day At each dressing change 473 mL 2   • torsemide (DEMADEX) 20 mg tablet TAKE 1 TABLET BY MOUTH DAILY 90 tablet 1   • ketoconazole (NIZORAL) 2 % cream Apply topically daily 60 g 1   • [DISCONTINUED] metoprolol tartrate (LOPRESSOR) 50 mg tablet TAKE ONE-HALF TABLET BY MOUTH  EVERY 12 HOURS 90 tablet 1     No current facility-administered medications on file prior to visit.      Social History     Tobacco Use   • Smoking status: Former     Current packs/day: 0.00     Average packs/day: 1.5 packs/day for 33.0 years (49.5 ttl pk-yrs)     Types: Cigarettes     Start date: 1956     Quit date:      Years since quittin.9     Passive exposure: Past   • Smokeless tobacco: Never   Vaping Use   • Vaping status: Never Used   Substance and Sexual Activity   • Alcohol use: Yes     Alcohol/week: 5.0 standard drinks of alcohol     Types: 5 Cans of beer per week     Comment: stopped last few months   • Drug use: Never   • Sexual activity: Not Currently     Partners: Female     Birth control/protection: Male Sterilization        Objective  /60 (BP Location: Left arm, Patient Position: Sitting, Cuff Size: Standard)   Pulse 68   Ht 6' 2\" (1.88 m)   Wt 107 kg (236 lb)   BMI 30.30 kg/m²      Physical Exam  Vitals reviewed.   Constitutional:       General: He is not in acute distress.     Appearance: Normal appearance. He is obese.   Cardiovascular:      Rate and Rhythm: Normal rate.      Pulses:           Radial pulses are 2+ on the right side and 2+ on the left side.        Femoral pulses are 2+ on the left side.       Dorsalis pedis pulses are 0 on the right side " and detected w/ Doppler on the left side.        Posterior tibial pulses are 0 on the right side and detected w/ Doppler on the left side.   Pulmonary:      Effort: Pulmonary effort is normal. No respiratory distress.   Musculoskeletal:         General: Normal range of motion.      Right lower leg: No edema.      Left lower leg: No edema.   Skin:     General: Skin is warm.      Comments: Right hallux and left heel wound, pictures in media   Neurological:      General: No focal deficit present.      Mental Status: He is alert and oriented to person, place, and time.      Sensory: Sensory deficit present.      Motor: No weakness.         Administrative Statements  I have spent a total time of 20 minutes in caring for this patient on the day of the visit/encounter including Diagnostic results, Prognosis, Risks and benefits of tx options, Instructions for management, Patient and family education, Impressions, Documenting in the medical record, Reviewing / ordering tests, medicine, procedures  , and Obtaining or reviewing history  .

## 2024-12-11 NOTE — TELEPHONE ENCOUNTER
REMINDER: Under Reason For Call, comments MUST be formatted as:   (Surgeon's Initials) / (Procedure)      Special Instructions / FYI: PATIENT REQUEST WENDY OR SON    Consent: Consent will be signed day of procedure.    For Surgical Clearances     Levels   1-3   ROUTE this encounter to [facility] Clearance Pool (AND)   [facility] Surgery Coordinator Pool     Level   4   ROUTE this encounter to [facility] Surgery Coordinator Pool       HYDRATION CLEARANCES   ONLY ROUTE TO  [facility] Clearance Pool       Yes, I have ROUTED this encounter to [facility] Surgery Coordinator and/or [facility] Clearance Pool.

## 2024-12-11 NOTE — PROGRESS NOTES
Assessment/Plan: Diabetic foot ulcer x 3 left foot.  Diabetic neuropathy.  Peripheral artery disease.  Pain.     Plan.  Chart reviewed.  PCP notes reviewed.  Wound healing center notes reviewed.  Patient examined.  At this time we agree with present treatment regimen.  Patient is to continue q. OD dressing changes.  Watch for signs of infection.  At this time we recommend change to Santyl.  Patient will consider.  Continue VNA.  Patient will follow with vascular surgery.  Patient needs arteriogram of right lower extremity patient will follow with wound healing center.  Watch for signs of infection.  Patient must remain on Trental as ordered.  This will be refilled as indicated.  Patient educated on care of the diabetic foot.  Aftercare instruction given.  Watch for signs of infection or further ulceration.        Assessment  Diagnoses and all orders for this visit:     Diabetic ulcer of left heel associated with type 2 diabetes mellitus, with fat layer exposed (HCC)     Diabetic ulcer of toe of left foot associated with type 2 diabetes mellitus, limited to breakdown of skin (HCC)     Diabetic ulcer of of right toe, Sumner grade 1.   Eschar covering.     Peripheral arteriosclerosis (HCC)     Diabetic polyneuropathy associated with type 2 diabetes mellitus (HCC)     Left foot pain     Onychomycosis              Subjective: Patient is diabetic.  He has peripheral artery disease.  Patient is status post left lower extremity arterial bypass.  He has much less pain in the leg.  He is attending wound healing sessions.  He gets home VNA.  He has no history of fever or night sweats at this time.                      Allergies   Allergen Reactions    Lisinopril Rash and Lip Swelling           Current Medications      Current Outpatient Medications:     acetaminophen (TYLENOL) 325 mg tablet, Take 2 tablets (650 mg total) by mouth every 6 (six) hours as needed for mild pain, Disp: , Rfl:     amLODIPine (NORVASC) 10 mg tablet,  Take 0.5 tablets (5 mg total) by mouth daily, Disp: 45 tablet, Rfl: 1    aspirin 81 mg chewable tablet, Chew 81 mg daily, Disp: , Rfl:     atorvastatin (LIPITOR) 40 mg tablet, Take 1 tablet (40 mg total) by mouth daily, Disp: 90 tablet, Rfl: 1    Blood Glucose Monitoring Suppl (OneTouch Verio Reflect) w/Device KIT, Check blood sugars twice daily. Please substitute with appropriate alternative as covered by patient's insurance. Dx: E11.65, Disp: 1 kit, Rfl: 0    cloNIDine (CATAPRES) 0.1 mg tablet, take 1 tablet by mouth every 12 hours, Disp: 180 tablet, Rfl: 1    clopidogrel (PLAVIX) 75 mg tablet, Take 1 tablet (75 mg total) by mouth daily, Disp: 30 tablet, Rfl: 3    collagenase (SANTYL) ointment, Apply topically daily (Patient not taking: Reported on 9/10/2024), Disp: 30 g, Rfl: 0    Droplet Pen Needles 32G X 4 MM MISC, USE EVERY EVENING, Disp: 100 each, Rfl: 5    DULoxetine (CYMBALTA) 30 mg delayed release capsule, take 1 capsule by mouth once daily, Disp: 90 capsule, Rfl: 0    Empagliflozin (Jardiance) 25 MG TABS, TAKE 1 TABLET BY MOUTH DAILY, Disp: 90 tablet, Rfl: 1    fenofibrate 160 MG tablet, TAKE 1 TABLET BY MOUTH DAILY, Disp: 100 tablet, Rfl: 1    glucose blood (OneTouch Verio) test strip, TEST TWICE A DAY, Disp: 200 strip, Rfl: 5    insulin glargine (LANTUS) 100 units/mL subcutaneous injection, Inject 15 Units under the skin daily at bedtime (Patient taking differently: Inject 15 Units under the skin daily at bedtime If BS > 150), Disp: 10 mL, Rfl: 0    isosorbide mononitrate (IMDUR) 30 mg 24 hr tablet, Take 3 tablets (90 mg total) by mouth daily, Disp: 270 tablet, Rfl: 1    ketoconazole (NIZORAL) 2 % cream, Apply topically daily (Patient not taking: Reported on 9/17/2024), Disp: 60 g, Rfl: 1    metFORMIN (GLUCOPHAGE) 500 mg tablet, Take 1 tablet (500 mg total) by mouth 2 (two) times a day with meals, Disp: 180 tablet, Rfl: 1    metoprolol tartrate (LOPRESSOR) 50 mg tablet, Take 0.5 tablets (25 mg total) by  mouth every 12 (twelve) hours (Patient taking differently: Take 25 mg by mouth 2 (two) times a day), Disp: 90 tablet, Rfl: 1    Multiple Vitamins-Minerals (MULTIVITAMIN MEN 50+ PO), Take by mouth daily, Disp: , Rfl:     nitroglycerin (NITROSTAT) 0.4 mg SL tablet, Place 1 tablet (0.4 mg total) under the tongue every 5 (five) minutes as needed for chest pain, Disp: 30 tablet, Rfl: 0    Omega-3 Fatty Acids (fish oil) 1,000 mg, Take 4,000 mg by mouth 2 (two) times a day, Disp: , Rfl:     ondansetron (ZOFRAN) 8 mg tablet, Take 0.5 tablets (4 mg total) by mouth every 6 (six) hours as needed for nausea or vomiting, Disp: 20 tablet, Rfl: 0    OneTouch Delica Lancets 33G MISC, Check blood sugars twice daily. Please substitute with appropriate alternative as covered by patient's insurance. Dx: E11.65, Disp: 200 each, Rfl: 3    oxyCODONE-acetaminophen (Percocet) 5-325 mg per tablet, Take 1 tablet by mouth 2 (two) times a day as needed for moderate pain or severe pain Max Daily Amount: 2 tablets, Disp: 60 tablet, Rfl: 0    pantoprazole (PROTONIX) 40 mg tablet, Take 1 tablet (40 mg total) by mouth daily, Disp: 90 tablet, Rfl: 1    pentoxifylline (TRENtal) 400 mg ER tablet, TAKE 1 TABLET BY MOUTH 3 TIMES  DAILY WITH MEALS, Disp: 270 tablet, Rfl: 3    pregabalin (LYRICA) 150 mg capsule, Take 1 capsule (150 mg total) by mouth 3 (three) times a day, Disp: 90 capsule, Rfl: 0    ranolazine (RANEXA) 1000 MG SR tablet, Take 1 tablet (1,000 mg total) by mouth 2 (two) times a day, Disp: 180 tablet, Rfl: 3    senna (SENOKOT) 8.6 mg, Take 1 tablet (8.6 mg total) by mouth daily Stool softener for constipation-- hold loose stools (Patient not taking: Reported on 9/11/2024), Disp: , Rfl:     sitaGLIPtin (Januvia) 100 mg tablet, TAKE 1 TABLET BY MOUTH DAILY, Disp: 100 tablet, Rfl: 1    torsemide (DEMADEX) 20 mg tablet, TAKE 1 TABLET BY MOUTH DAILY, Disp: 90 tablet, Rfl: 1        Problem List       Patient Active Problem List   Diagnosis     Mixed hyperlipidemia    Primary hypertension    Coronary artery disease involving native coronary artery of native heart without angina pectoris    S/P angioplasty with stent    Hx of CABG    S/P AAA repair    S/P prostatectomy    H/O prostate cancer    Type 2 diabetes mellitus with diabetic polyneuropathy, with long-term current use of insulin (HCC)    Varicose veins of left lower extremity    History of endovascular stent graft for abdominal aortic aneurysm (AAA)    Bruit (arterial)    Peripheral artery disease (HCC)    Type 2 diabetes mellitus with diabetic peripheral angiopathy without gangrene, with long-term current use of insulin (HCC)    Actinic keratoses    Acute kidney injury superimposed on chronic kidney disease  (HCC)    Platelets decreased (HCC)    Gross hematuria    Chronic HFpEF/Moderate pHTN (HFpEF) (HCC)    Mild aortic stenosis    Chronic kidney disease-mineral and bone disorder    Stable angina pectoris    Hypertensive urgency    Elevated troponin level not due myocardial infarction    Diabetic ulcer of right midfoot associated with diabetes mellitus due to underlying condition, limited to breakdown of skin (HCC)    Acute on chronic diastolic heart failure (HCC)    Stage 3b chronic kidney disease (HCC)    Frequent PVCs    Melena    Symptomatic anemia    Multiple gastric ulcers    Iron deficiency anemia due to chronic blood loss    Duodenal ulcer    History of colon polyps    Shortness of breath    Iron deficiency anemia    Acute deep vein thrombosis (DVT) of left peroneal vein (HCC)    Plantar fasciitis, right    Diabetic ulcer of toe of left foot associated with type 2 diabetes mellitus, limited to breakdown of skin (HCC)    History of DVT (deep vein thrombosis)    SOB (shortness of breath)    Nausea    Elevated troponin    Foot ulcer (HCC)               Subjective  Patient ID: Thomas Horne is a 79 y.o. male.     HPI     The following portions of the patient's history were reviewed and updated as  appropriate: He  has a past medical history of Anemia, CAD (coronary artery disease), Callus, Cancer ([facility]), CHF (congestive heart failure) (HCC), Chronic kidney disease, Clotting disorder ([facility]), Coronary artery disease (1988), Deep vein thrombosis ([facility]), Diabetes mellitus ([facility]), Difficulty walking, Duodenal ulcer, Heart disease (1988), Hyperlipidemia, Hypertension, Myocardial infarction ([facility]), Neuropathy, Neuropathy in diabetes ([facility]), Plantar fasciitis, and Sleep apnea.  He        Patient Active Problem List     Diagnosis Date Noted    Foot ulcer ([facility]) 09/07/2024    SOB (shortness of breath) 09/06/2024    Nausea 09/06/2024    Elevated troponin 09/06/2024    History of DVT (deep vein thrombosis) 08/26/2024    Diabetic ulcer of toe of left foot associated with type 2 diabetes mellitus, limited to breakdown of skin ([facility]) 08/08/2024    Plantar fasciitis, right 07/10/2024    Acute deep vein thrombosis (DVT) of left peroneal vein ([facility]) 05/01/2024    Iron deficiency anemia 04/23/2024    Shortness of breath 04/09/2024    History of colon polyps 02/22/2024    Duodenal ulcer 02/01/2024    Multiple gastric ulcers 12/27/2023    Iron deficiency anemia due to chronic blood loss 12/27/2023    Melena 12/26/2023    Symptomatic anemia 12/26/2023    Frequent PVCs 06/17/2023    Acute on chronic diastolic heart failure ([facility]) 06/16/2023    Stage 3b chronic kidney disease ([facility]) 06/16/2023    Diabetic ulcer of right midfoot associated with diabetes mellitus due to underlying condition, limited to breakdown of skin ([facility]) 06/15/2023    Hypertensive urgency 05/11/2023    Elevated troponin level not due myocardial infarction 05/11/2023    Stable angina pectoris 03/07/2023    Chronic kidney disease-mineral and bone disorder 11/30/2022    Mild aortic stenosis 11/11/2022    Chronic HFpEF/Moderate pHTN (HFpEF) ([facility]) 11/10/2022    Gross hematuria 07/26/2022    Platelets decreased ([facility]) 07/22/2022    Acute kidney injury superimposed on chronic  kidney disease  (HCC) 02/16/2022    Actinic keratoses 01/14/2022    Type 2 diabetes mellitus with diabetic peripheral angiopathy without gangrene, with long-term current use of insulin (HCC) 01/11/2022    Varicose veins of left lower extremity 06/25/2021    History of endovascular stent graft for abdominal aortic aneurysm (AAA) 06/25/2021    Bruit (arterial) 06/25/2021    Peripheral artery disease (HCC) 06/25/2021    Type 2 diabetes mellitus with diabetic polyneuropathy, with long-term current use of insulin (HCC) 06/10/2021    Mixed hyperlipidemia 05/11/2021    Primary hypertension 05/11/2021    Coronary artery disease involving native coronary artery of native heart without angina pectoris 05/11/2021    S/P angioplasty with stent 05/11/2021    Hx of CABG 05/11/2021    S/P AAA repair 05/11/2021    S/P prostatectomy 05/11/2021    H/O prostate cancer 05/11/2021      He  has a past surgical history that includes Cardiac surgery (2002); Tonsillectomy; ADENOIDECTOMY; Coronary artery bypass graft; Cholecystectomy; Prostate surgery; Cardiac catheterization (Left, 10/19/2022); Colonoscopy (02/14/2024); pr slctv cathj 3rd+ ord slctv abdl pel/lxtr brnch (Right, 11/01/2023); IR lower extremity angiogram (11/01/2023); pr bypass w/vein femoral-popliteal (Right, 11/16/2023); Abdominal aortic aneurysm repair; Urinary sphincter implant; IR lower extremity angiogram (08/07/2024); pr slctv cathj 3rd+ ord slctv abdl pel/lxtr brnch (Left, 08/07/2024); Back surgery (1985); Laminectomy (1990); and pr bypass w/vein femoral-popliteal (Left, 8/30/2024).  His family history includes Alcohol abuse in his father; Cancer in his brother, brother, sister, and sister; Diabetes in his mother and sister; Heart disease in his brother.  He  reports that he quit smoking about 36 years ago. His smoking use included cigarettes. He started smoking about 68 years ago. He has a 49.5 pack-year smoking history. He has been exposed to tobacco smoke. He has  never used smokeless tobacco. He reports that he does not currently use alcohol after a past usage of about 5.0 standard drinks of alcohol per week. He reports that he does not use drugs.  Current Rx          Current Outpatient Medications   Medication Sig Dispense Refill    acetaminophen (TYLENOL) 325 mg tablet Take 2 tablets (650 mg total) by mouth every 6 (six) hours as needed for mild pain        amLODIPine (NORVASC) 10 mg tablet Take 0.5 tablets (5 mg total) by mouth daily 45 tablet 1    aspirin 81 mg chewable tablet Chew 81 mg daily        atorvastatin (LIPITOR) 40 mg tablet Take 1 tablet (40 mg total) by mouth daily 90 tablet 1    Blood Glucose Monitoring Suppl (OneTouch Verio Reflect) w/Device KIT Check blood sugars twice daily. Please substitute with appropriate alternative as covered by patient's insurance. Dx: E11.65 1 kit 0    cloNIDine (CATAPRES) 0.1 mg tablet take 1 tablet by mouth every 12 hours 180 tablet 1    clopidogrel (PLAVIX) 75 mg tablet Take 1 tablet (75 mg total) by mouth daily 30 tablet 3    collagenase (SANTYL) ointment Apply topically daily (Patient not taking: Reported on 9/10/2024) 30 g 0    Droplet Pen Needles 32G X 4 MM MISC USE EVERY EVENING 100 each 5    DULoxetine (CYMBALTA) 30 mg delayed release capsule take 1 capsule by mouth once daily 90 capsule 0    Empagliflozin (Jardiance) 25 MG TABS TAKE 1 TABLET BY MOUTH DAILY 90 tablet 1    fenofibrate 160 MG tablet TAKE 1 TABLET BY MOUTH DAILY 100 tablet 1    glucose blood (OneTouch Verio) test strip TEST TWICE A  strip 5    insulin glargine (LANTUS) 100 units/mL subcutaneous injection Inject 15 Units under the skin daily at bedtime (Patient taking differently: Inject 15 Units under the skin daily at bedtime If BS > 150) 10 mL 0    isosorbide mononitrate (IMDUR) 30 mg 24 hr tablet Take 3 tablets (90 mg total) by mouth daily 270 tablet 1    ketoconazole (NIZORAL) 2 % cream Apply topically daily (Patient not taking: Reported on  9/17/2024) 60 g 1    metFORMIN (GLUCOPHAGE) 500 mg tablet Take 1 tablet (500 mg total) by mouth 2 (two) times a day with meals 180 tablet 1    metoprolol tartrate (LOPRESSOR) 50 mg tablet Take 0.5 tablets (25 mg total) by mouth every 12 (twelve) hours (Patient taking differently: Take 25 mg by mouth 2 (two) times a day) 90 tablet 1    Multiple Vitamins-Minerals (MULTIVITAMIN MEN 50+ PO) Take by mouth daily        nitroglycerin (NITROSTAT) 0.4 mg SL tablet Place 1 tablet (0.4 mg total) under the tongue every 5 (five) minutes as needed for chest pain 30 tablet 0    Omega-3 Fatty Acids (fish oil) 1,000 mg Take 4,000 mg by mouth 2 (two) times a day        ondansetron (ZOFRAN) 8 mg tablet Take 0.5 tablets (4 mg total) by mouth every 6 (six) hours as needed for nausea or vomiting 20 tablet 0    OneTouch Delica Lancets 33G MISC Check blood sugars twice daily. Please substitute with appropriate alternative as covered by patient's insurance. Dx: E11.65 200 each 3    oxyCODONE-acetaminophen (Percocet) 5-325 mg per tablet Take 1 tablet by mouth 2 (two) times a day as needed for moderate pain or severe pain Max Daily Amount: 2 tablets 60 tablet 0    pantoprazole (PROTONIX) 40 mg tablet Take 1 tablet (40 mg total) by mouth daily 90 tablet 1    pentoxifylline (TRENtal) 400 mg ER tablet TAKE 1 TABLET BY MOUTH 3 TIMES  DAILY WITH MEALS 270 tablet 3    pregabalin (LYRICA) 150 mg capsule Take 1 capsule (150 mg total) by mouth 3 (three) times a day 90 capsule 0    ranolazine (RANEXA) 1000 MG SR tablet Take 1 tablet (1,000 mg total) by mouth 2 (two) times a day 180 tablet 3    senna (SENOKOT) 8.6 mg Take 1 tablet (8.6 mg total) by mouth daily Stool softener for constipation-- hold loose stools (Patient not taking: Reported on 9/11/2024)        sitaGLIPtin (Januvia) 100 mg tablet TAKE 1 TABLET BY MOUTH DAILY 100 tablet 1    torsemide (DEMADEX) 20 mg tablet TAKE 1 TABLET BY MOUTH DAILY 90 tablet 1      No current facility-administered  medications for this visit.              Current Outpatient Medications on File Prior to Visit   Medication Sig    acetaminophen (TYLENOL) 325 mg tablet Take 2 tablets (650 mg total) by mouth every 6 (six) hours as needed for mild pain    amLODIPine (NORVASC) 10 mg tablet Take 0.5 tablets (5 mg total) by mouth daily    aspirin 81 mg chewable tablet Chew 81 mg daily    atorvastatin (LIPITOR) 40 mg tablet Take 1 tablet (40 mg total) by mouth daily    Blood Glucose Monitoring Suppl (OneTouch Verio Reflect) w/Device KIT Check blood sugars twice daily. Please substitute with appropriate alternative as covered by patient's insurance. Dx: E11.65    cloNIDine (CATAPRES) 0.1 mg tablet take 1 tablet by mouth every 12 hours    clopidogrel (PLAVIX) 75 mg tablet Take 1 tablet (75 mg total) by mouth daily    collagenase (SANTYL) ointment Apply topically daily (Patient not taking: Reported on 9/10/2024)    Droplet Pen Needles 32G X 4 MM MISC USE EVERY EVENING    DULoxetine (CYMBALTA) 30 mg delayed release capsule take 1 capsule by mouth once daily    Empagliflozin (Jardiance) 25 MG TABS TAKE 1 TABLET BY MOUTH DAILY    fenofibrate 160 MG tablet TAKE 1 TABLET BY MOUTH DAILY    glucose blood (OneTouch Verio) test strip TEST TWICE A DAY    insulin glargine (LANTUS) 100 units/mL subcutaneous injection Inject 15 Units under the skin daily at bedtime (Patient taking differently: Inject 15 Units under the skin daily at bedtime If BS > 150)    isosorbide mononitrate (IMDUR) 30 mg 24 hr tablet Take 3 tablets (90 mg total) by mouth daily    ketoconazole (NIZORAL) 2 % cream Apply topically daily (Patient not taking: Reported on 9/17/2024)    metFORMIN (GLUCOPHAGE) 500 mg tablet Take 1 tablet (500 mg total) by mouth 2 (two) times a day with meals    metoprolol tartrate (LOPRESSOR) 50 mg tablet Take 0.5 tablets (25 mg total) by mouth every 12 (twelve) hours (Patient taking differently: Take 25 mg by mouth 2 (two) times a day)    Multiple  Vitamins-Minerals (MULTIVITAMIN MEN 50+ PO) Take by mouth daily    nitroglycerin (NITROSTAT) 0.4 mg SL tablet Place 1 tablet (0.4 mg total) under the tongue every 5 (five) minutes as needed for chest pain    Omega-3 Fatty Acids (fish oil) 1,000 mg Take 4,000 mg by mouth 2 (two) times a day    ondansetron (ZOFRAN) 8 mg tablet Take 0.5 tablets (4 mg total) by mouth every 6 (six) hours as needed for nausea or vomiting    OneTouch Delica Lancets 33G MISC Check blood sugars twice daily. Please substitute with appropriate alternative as covered by patient's insurance. Dx: E11.65    oxyCODONE-acetaminophen (Percocet) 5-325 mg per tablet Take 1 tablet by mouth 2 (two) times a day as needed for moderate pain or severe pain Max Daily Amount: 2 tablets    pantoprazole (PROTONIX) 40 mg tablet Take 1 tablet (40 mg total) by mouth daily    pentoxifylline (TRENtal) 400 mg ER tablet TAKE 1 TABLET BY MOUTH 3 TIMES  DAILY WITH MEALS    pregabalin (LYRICA) 150 mg capsule Take 1 capsule (150 mg total) by mouth 3 (three) times a day    ranolazine (RANEXA) 1000 MG SR tablet Take 1 tablet (1,000 mg total) by mouth 2 (two) times a day    senna (SENOKOT) 8.6 mg Take 1 tablet (8.6 mg total) by mouth daily Stool softener for constipation-- hold loose stools (Patient not taking: Reported on 9/11/2024)    sitaGLIPtin (Januvia) 100 mg tablet TAKE 1 TABLET BY MOUTH DAILY    torsemide (DEMADEX) 20 mg tablet TAKE 1 TABLET BY MOUTH DAILY      No current facility-administered medications on file prior to visit.      He is allergic to lisinopril..         Objective:  Patient's shoes and socks removed.  Objective  Foot Exam     General  General Appearance: appears stated age and healthy   Orientation: alert and oriented to person, place, and time   Affect: appropriate   Gait: antalgic         Right Foot/Ankle      Inspection and Palpation  Swelling: dorsum   Arch: pes cavus  Hallux limitus: yes  Skin Exam: dry skin;      Neurovascular  Dorsalis pedis:  1+  Posterior tibial: 1+  Saphenous nerve sensation: diminished  Tibial nerve sensation: diminished  Superficial peroneal nerve sensation: diminished  Deep peroneal nerve sensation: diminished  Sural nerve sensation: diminished        Left Foot/Ankle       Inspection and Palpation  Tenderness: bony tenderness and calcaneus tenderness   Swelling: dorsum   Arch: pes cavus  Hallux limitus: yes  Skin Exam: dry skin;      Neurovascular  Dorsalis pedis: 1+  Posterior tibial: 1+  Saphenous nerve sensation: diminished  Tibial nerve sensation: diminished  Superficial peroneal nerve sensation: diminished  Deep peroneal nerve sensation: diminished  Sural nerve sensation: diminished        Physical Exam  Vitals and nursing note reviewed.   Constitutional:       Appearance: Normal appearance.   Cardiovascular:      Rate and Rhythm: Normal rate and regular rhythm.      Pulses: Pulses are weak.           Dorsalis pedis pulses are 1+ on the right side and 1+ on the left side.        Posterior tibial pulses are 1+ on the right side and 1+ on the left side.   Musculoskeletal:      Left foot: Bony tenderness present.   Feet:      Right foot:      Skin integrity: Dry skin present.      Left foot:      Skin integrity: Dry skin present.      Comments: Patient is 1 left foot diabetic ulcers.  Large plantar medial aspect ulcer of the heel is a Sumner grade 2.  Negative cellulitis.  Lateral aspect fifth met base demonstrates 1.0 cm grade Sumner grade 1 ulcer.  Distal aspect second left toe demonstrates 0.5 cm² Sumner grade 1 ulcer.  Ulcers demonstrate no evidence of abscess surrounding mild undermining.  Negative cellulitis.  Skin:     Capillary Refill: Capillary refill takes 2 to 3 seconds.   Psychiatric:         Mood and Affect: Mood normal.         Behavior: Behavior normal.         Thought Content: Thought content normal.         Judgment: Judgment normal.      Patient's shoes and socks removed.     Right Foot/Ankle   Right Foot  Inspection  Skin Exam: dry skin.      Toe Exam: tenderness and right toe deformity.      Sensory   Vibration: absent  Proprioception: diminished  Monofilament testing: diminished     Vascular  Capillary refills: < 3 seconds  The right DP pulse is 1+. The right PT pulse is 1+.      Right Toe  - Comprehensive Exam  Arch: pes cavus  Hallux limitus: yes  Swelling: dorsum         Left Foot/Ankle  Left Foot Inspection  Skin Exam: dry skin.      Toe Exam: tenderness and left toe deformity.      Sensory   Vibration: absent  Proprioception: diminished  Monofilament testing: diminished     Vascular  Capillary refills: < 3 seconds  The left DP pulse is 1+. The left PT pulse is 1+.      Left Toe  - Comprehensive Exam  Arch: pes cavus  Hallux limitus: yes  Swelling: dorsum   Tenderness: bony tenderness and calcaneus tenderness         Assign Risk Category  Deformity present  Loss of protective sensation  Weak pulses  Risk: 2

## 2024-12-11 NOTE — PATIENT INSTRUCTIONS
Abdominal angiogram with right leg runoff and possible intervention    Will get updated blood work and message nephrologist for recommendations regarding hydration pre and post angiogram    Continue aspirin and Plavix  Continue statin  Our office/surgery coordinators will be in touch with planning.

## 2024-12-11 NOTE — PROGRESS NOTES
HBO Treatment Course Details       Treatment Notes: Treatment tolerated well.  No complaints of pain or discomfort     Treatment Course Number: 29  Total Treatments Ordered:  40     Diagnosis:   1. Diabetic ulcer of left foot associated with type 2 diabetes mellitus, with muscle involvement without evidence of necrosis, unspecified part of foot (HCC)  Hyperbaric oxygen thearpy          HBO Treatment Details:  In-Patient Visit: no  Treatment Length:90 Minutes(Minutes)  Chamber #: Hard sided Monoplace Chamber    Pre-Treatment details:  Pre-treatment protocol Treatment Protocol: 2.5 INDY X 90 minutes w/ 100% oxygen, two 5 minute air breaks                                             Treatment details:  INDY Rate: 2.5  Started Compression: 0833  Reached Compression: 0843  Total Compression Time: 10 (Minutes)  Total Holding Time: 100 (Minutes)  Started Decompression: 1023  Reached Surface: 1033  Total Decompression Time: 10 (Minutes)  Total Airbreaks:   (Minutes)  Total Time of Treatment: 120 (Minutes)  Symptoms Noted During Treatment: None (Minutes)    Post treatment details:                                                    Vital Signs:  HBO Glucose Reference Range: 100-350 mg/dl   Pre-Treatment Post-Treatment     Time vitals are taken: 1040   Blood Pressure: 142/66 Blood Pressure: 110/56   Pulse: 73 Pulse: 50   Resp: 18 Resp: 18     Temp: 96.6 °F (35.9 °C)   Pre-Inspection Glucose readin Post-Inspection Glucose readin       Allergies   Allergen Reactions    Lisinopril Rash and Lip Swelling     Patient Active Problem List    Diagnosis Date Noted    Foot ulcer (HCC) 2024    SOB (shortness of breath) 2024    Nausea 2024    Elevated troponin 2024    History of DVT (deep vein thrombosis) 2024    Diabetic ulcer of toe of left foot associated with type 2 diabetes mellitus, limited to breakdown of skin (HCC) 2024    Plantar fasciitis, right 07/10/2024    Acute deep vein  thrombosis (DVT) of left peroneal vein (HCC) 05/01/2024    Iron deficiency anemia 04/23/2024    Shortness of breath 04/09/2024    History of colon polyps 02/22/2024    Duodenal ulcer 02/01/2024    Multiple gastric ulcers 12/27/2023    Iron deficiency anemia due to chronic blood loss 12/27/2023    Melena 12/26/2023    Symptomatic anemia 12/26/2023    Frequent PVCs 06/17/2023    Acute on chronic diastolic heart failure (HCC) 06/16/2023    Stage 3b chronic kidney disease ([facility]) 06/16/2023    Diabetic ulcer of right midfoot associated with diabetes mellitus due to underlying condition, limited to breakdown of skin ([facility]) 06/15/2023    Hypertensive urgency 05/11/2023    Elevated troponin level not due myocardial infarction 05/11/2023    Stable angina pectoris ([facility]) 03/07/2023    Chronic kidney disease-mineral and bone disorder 11/30/2022    Mild aortic stenosis 11/11/2022    Chronic HFpEF/Moderate pHTN (HFpEF) ([facility]) 11/10/2022    Gross hematuria 07/26/2022    Platelets decreased ([facility]) 07/22/2022    Acute kidney injury superimposed on chronic kidney disease  ([facility]) 02/16/2022    Actinic keratoses 01/14/2022    Type 2 diabetes mellitus with diabetic peripheral angiopathy without gangrene, with long-term current use of insulin ([facility]) 01/11/2022    Varicose veins of left lower extremity 06/25/2021    History of endovascular stent graft for abdominal aortic aneurysm (AAA) 06/25/2021    Bruit (arterial) 06/25/2021    Peripheral artery disease (HCC) 06/25/2021    Type 2 diabetes mellitus with diabetic polyneuropathy, with long-term current use of insulin ([facility]) 06/10/2021    Mixed hyperlipidemia 05/11/2021    Primary hypertension 05/11/2021    Coronary artery disease involving native coronary artery of native heart without angina pectoris 05/11/2021    S/P angioplasty with stent 05/11/2021    Hx of CABG 05/11/2021    S/P AAA repair 05/11/2021    S/P prostatectomy 05/11/2021    H/O prostate cancer 05/11/2021     No orders of the  defined types were placed in this encounter.

## 2024-12-12 ENCOUNTER — OFFICE VISIT (OUTPATIENT)
Dept: WOUND CARE | Facility: HOSPITAL | Age: 79
DRG: 242 | End: 2024-12-12
Payer: COMMERCIAL

## 2024-12-12 ENCOUNTER — HOSPITAL ENCOUNTER (INPATIENT)
Facility: HOSPITAL | Age: 79
LOS: 4 days | Discharge: HOME | DRG: 242 | End: 2024-12-17
Attending: EMERGENCY MEDICINE | Admitting: INTERNAL MEDICINE
Payer: COMMERCIAL

## 2024-12-12 ENCOUNTER — APPOINTMENT (OUTPATIENT)
Dept: RADIOLOGY | Facility: HOSPITAL | Age: 79
DRG: 242 | End: 2024-12-12
Payer: COMMERCIAL

## 2024-12-12 VITALS
DIASTOLIC BLOOD PRESSURE: 84 MMHG | TEMPERATURE: 97.2 F | HEART RATE: 66 BPM | SYSTOLIC BLOOD PRESSURE: 163 MMHG | RESPIRATION RATE: 18 BRPM

## 2024-12-12 DIAGNOSIS — I16.0 HYPERTENSIVE URGENCY: ICD-10-CM

## 2024-12-12 DIAGNOSIS — R79.89 ELEVATED TROPONIN LEVEL NOT DUE MYOCARDIAL INFARCTION: ICD-10-CM

## 2024-12-12 DIAGNOSIS — N17.9 ACUTE KIDNEY INJURY SUPERIMPOSED ON CHRONIC KIDNEY DISEASE  (HCC): ICD-10-CM

## 2024-12-12 DIAGNOSIS — I49.3 FREQUENT PVCS: ICD-10-CM

## 2024-12-12 DIAGNOSIS — Z79.4 TYPE 2 DIABETES MELLITUS WITH DIABETIC POLYNEUROPATHY, WITH LONG-TERM CURRENT USE OF INSULIN (HCC): ICD-10-CM

## 2024-12-12 DIAGNOSIS — L97.525 DIABETIC ULCER OF LEFT FOOT ASSOCIATED WITH TYPE 2 DIABETES MELLITUS, WITH MUSCLE INVOLVEMENT WITHOUT EVIDENCE OF NECROSIS, UNSPECIFIED PART OF FOOT (HCC): ICD-10-CM

## 2024-12-12 DIAGNOSIS — I50.32 CHRONIC HEART FAILURE WITH PRESERVED EJECTION FRACTION (HFPEF) (HCC): ICD-10-CM

## 2024-12-12 DIAGNOSIS — N18.9 CHRONIC KIDNEY DISEASE-MINERAL AND BONE DISORDER: ICD-10-CM

## 2024-12-12 DIAGNOSIS — R00.1 SYMPTOMATIC BRADYCARDIA: ICD-10-CM

## 2024-12-12 DIAGNOSIS — I49.3 PVC (PREMATURE VENTRICULAR CONTRACTION): ICD-10-CM

## 2024-12-12 DIAGNOSIS — I49.8 BIGEMINY: ICD-10-CM

## 2024-12-12 DIAGNOSIS — M89.9 CHRONIC KIDNEY DISEASE-MINERAL AND BONE DISORDER: ICD-10-CM

## 2024-12-12 DIAGNOSIS — I35.0 NONRHEUMATIC AORTIC VALVE STENOSIS: ICD-10-CM

## 2024-12-12 DIAGNOSIS — Z86.718 HISTORY OF DVT (DEEP VEIN THROMBOSIS): Chronic | ICD-10-CM

## 2024-12-12 DIAGNOSIS — E11.42 TYPE 2 DIABETES MELLITUS WITH DIABETIC POLYNEUROPATHY, WITH LONG-TERM CURRENT USE OF INSULIN (HCC): ICD-10-CM

## 2024-12-12 DIAGNOSIS — I50.33 ACUTE ON CHRONIC DIASTOLIC HF (HEART FAILURE) (HCC): ICD-10-CM

## 2024-12-12 DIAGNOSIS — I20.89 STABLE ANGINA PECTORIS (HCC): ICD-10-CM

## 2024-12-12 DIAGNOSIS — E11.621 DIABETIC ULCER OF LEFT FOOT ASSOCIATED WITH TYPE 2 DIABETES MELLITUS, WITH MUSCLE INVOLVEMENT WITHOUT EVIDENCE OF NECROSIS, UNSPECIFIED PART OF FOOT (HCC): ICD-10-CM

## 2024-12-12 DIAGNOSIS — N18.9 ACUTE KIDNEY INJURY SUPERIMPOSED ON CHRONIC KIDNEY DISEASE  (HCC): ICD-10-CM

## 2024-12-12 DIAGNOSIS — K26.9 DUODENAL ULCER: ICD-10-CM

## 2024-12-12 DIAGNOSIS — E83.9 CHRONIC KIDNEY DISEASE-MINERAL AND BONE DISORDER: ICD-10-CM

## 2024-12-12 DIAGNOSIS — R00.1 BRADYCARDIA: Primary | ICD-10-CM

## 2024-12-12 DIAGNOSIS — Z79.4 TYPE 2 DIABETES MELLITUS WITH DIABETIC PERIPHERAL ANGIOPATHY WITHOUT GANGRENE, WITH LONG-TERM CURRENT USE OF INSULIN (HCC): ICD-10-CM

## 2024-12-12 DIAGNOSIS — E11.51 TYPE 2 DIABETES MELLITUS WITH DIABETIC PERIPHERAL ANGIOPATHY WITHOUT GANGRENE, WITH LONG-TERM CURRENT USE OF INSULIN (HCC): ICD-10-CM

## 2024-12-12 DIAGNOSIS — I49.5 SSS (SICK SINUS SYNDROME) (HCC): ICD-10-CM

## 2024-12-12 PROBLEM — S81.809A MULTIPLE OPEN WOUNDS OF LOWER EXTREMITY: Status: ACTIVE | Noted: 2024-12-12

## 2024-12-12 LAB
2HR DELTA HS TROPONIN: -2 NG/L
ALBUMIN SERPL BCG-MCNC: 4 G/DL (ref 3.5–5)
ALP SERPL-CCNC: 37 U/L (ref 34–104)
ALT SERPL W P-5'-P-CCNC: 13 U/L (ref 7–52)
ANION GAP SERPL CALCULATED.3IONS-SCNC: 4 MMOL/L (ref 4–13)
APTT PPP: 35 SECONDS (ref 23–34)
AST SERPL W P-5'-P-CCNC: 15 U/L (ref 13–39)
ATRIAL RATE: 22 BPM
ATRIAL RATE: 55 BPM
BASOPHILS # BLD AUTO: 0.02 THOUSANDS/ÂΜL (ref 0–0.1)
BASOPHILS NFR BLD AUTO: 0 % (ref 0–1)
BILIRUB SERPL-MCNC: 0.67 MG/DL (ref 0.2–1)
BILIRUB UR QL STRIP: NEGATIVE
BNP SERPL-MCNC: 1067 PG/ML (ref 0–100)
BUN SERPL-MCNC: 30 MG/DL (ref 5–25)
CALCIUM SERPL-MCNC: 9.3 MG/DL (ref 8.4–10.2)
CARDIAC TROPONIN I PNL SERPL HS: 29 NG/L (ref 8–18)
CARDIAC TROPONIN I PNL SERPL HS: 33 NG/L (ref ?–50)
CARDIAC TROPONIN I PNL SERPL HS: 35 NG/L (ref ?–50)
CHLORIDE SERPL-SCNC: 104 MMOL/L (ref 96–108)
CLARITY UR: CLEAR
CO2 SERPL-SCNC: 29 MMOL/L (ref 21–32)
COLOR UR: YELLOW
CREAT SERPL-MCNC: 1.42 MG/DL (ref 0.6–1.3)
D DIMER PPP FEU-MCNC: 1.74 UG/ML FEU
EOSINOPHIL # BLD AUTO: 0.16 THOUSAND/ÂΜL (ref 0–0.61)
EOSINOPHIL NFR BLD AUTO: 2 % (ref 0–6)
ERYTHROCYTE [DISTWIDTH] IN BLOOD BY AUTOMATED COUNT: 14.4 % (ref 11.6–15.1)
GFR SERPL CREATININE-BSD FRML MDRD: 45 ML/MIN/1.73SQ M
GLUCOSE SERPL-MCNC: 142 MG/DL (ref 65–140)
GLUCOSE SERPL-MCNC: 169 MG/DL (ref 65–140)
GLUCOSE SERPL-MCNC: 185 MG/DL (ref 65–140)
GLUCOSE SERPL-MCNC: 196 MG/DL (ref 65–140)
GLUCOSE SERPL-MCNC: 202 MG/DL (ref 65–140)
GLUCOSE SERPL-MCNC: 227 MG/DL (ref 65–140)
GLUCOSE UR STRIP-MCNC: ABNORMAL MG/DL
HCT VFR BLD AUTO: 41.3 % (ref 36.5–49.3)
HGB BLD-MCNC: 12.9 G/DL (ref 12–17)
HGB UR QL STRIP.AUTO: NEGATIVE
IMM GRANULOCYTES # BLD AUTO: 0.04 THOUSAND/UL (ref 0–0.2)
IMM GRANULOCYTES NFR BLD AUTO: 0 % (ref 0–2)
INR PPP: 1.08 (ref 0.85–1.19)
KETONES UR STRIP-MCNC: NEGATIVE MG/DL
LEUKOCYTE ESTERASE UR QL STRIP: NEGATIVE
LYMPHOCYTES # BLD AUTO: 1.44 THOUSANDS/ÂΜL (ref 0.6–4.47)
LYMPHOCYTES NFR BLD AUTO: 13 % (ref 14–44)
MAGNESIUM SERPL-MCNC: 2.2 MG/DL (ref 1.9–2.7)
MCH RBC QN AUTO: 27.4 PG (ref 26.8–34.3)
MCHC RBC AUTO-ENTMCNC: 31.2 G/DL (ref 31.4–37.4)
MCV RBC AUTO: 88 FL (ref 82–98)
MONOCYTES # BLD AUTO: 0.66 THOUSAND/ÂΜL (ref 0.17–1.22)
MONOCYTES NFR BLD AUTO: 6 % (ref 4–12)
NEUTROPHILS # BLD AUTO: 8.57 THOUSANDS/ÂΜL (ref 1.85–7.62)
NEUTS SEG NFR BLD AUTO: 79 % (ref 43–75)
NITRITE UR QL STRIP: NEGATIVE
NRBC BLD AUTO-RTO: 0 /100 WBCS
P AXIS: 77 DEGREES
P AXIS: 99 DEGREES
PH UR STRIP.AUTO: 5.5 [PH]
PLATELET # BLD AUTO: 199 THOUSANDS/UL (ref 149–390)
PMV BLD AUTO: 11.6 FL (ref 8.9–12.7)
POTASSIUM SERPL-SCNC: 5 MMOL/L (ref 3.5–5.3)
PR INTERVAL: 262 MS
PROT SERPL-MCNC: 7.4 G/DL (ref 6.4–8.4)
PROT UR STRIP-MCNC: NEGATIVE MG/DL
PROTHROMBIN TIME: 14.6 SECONDS (ref 12.3–15)
QRS AXIS: -56 DEGREES
QRS AXIS: -56 DEGREES
QRSD INTERVAL: 124 MS
QRSD INTERVAL: 128 MS
QT INTERVAL: 476 MS
QT INTERVAL: 552 MS
QTC INTERVAL: 456 MS
QTC INTERVAL: 555 MS
RBC # BLD AUTO: 4.71 MILLION/UL (ref 3.88–5.62)
SODIUM SERPL-SCNC: 137 MMOL/L (ref 135–147)
SP GR UR STRIP.AUTO: 1.02 (ref 1–1.03)
T WAVE AXIS: 270 DEGREES
T WAVE AXIS: 96 DEGREES
TSH SERPL DL<=0.05 MIU/L-ACNC: 1.89 UIU/ML (ref 0.45–4.5)
UROBILINOGEN UR STRIP-ACNC: <2 MG/DL
VENTRICULAR RATE: 55 BPM
VENTRICULAR RATE: 61 BPM
WBC # BLD AUTO: 10.89 THOUSAND/UL (ref 4.31–10.16)

## 2024-12-12 PROCEDURE — 96361 HYDRATE IV INFUSION ADD-ON: CPT

## 2024-12-12 PROCEDURE — 96365 THER/PROPH/DIAG IV INF INIT: CPT

## 2024-12-12 PROCEDURE — 84484 ASSAY OF TROPONIN QUANT: CPT | Performed by: EMERGENCY MEDICINE

## 2024-12-12 PROCEDURE — 93005 ELECTROCARDIOGRAM TRACING: CPT

## 2024-12-12 PROCEDURE — G0277 HBOT, FULL BODY CHAMBER, 30M: HCPCS | Performed by: FAMILY MEDICINE

## 2024-12-12 PROCEDURE — 83735 ASSAY OF MAGNESIUM: CPT | Performed by: EMERGENCY MEDICINE

## 2024-12-12 PROCEDURE — 99223 1ST HOSP IP/OBS HIGH 75: CPT | Performed by: INTERNAL MEDICINE

## 2024-12-12 PROCEDURE — 84484 ASSAY OF TROPONIN QUANT: CPT

## 2024-12-12 PROCEDURE — 82948 REAGENT STRIP/BLOOD GLUCOSE: CPT

## 2024-12-12 PROCEDURE — 99285 EMERGENCY DEPT VISIT HI MDM: CPT

## 2024-12-12 PROCEDURE — 71045 X-RAY EXAM CHEST 1 VIEW: CPT

## 2024-12-12 PROCEDURE — 83880 ASSAY OF NATRIURETIC PEPTIDE: CPT | Performed by: EMERGENCY MEDICINE

## 2024-12-12 PROCEDURE — 84443 ASSAY THYROID STIM HORMONE: CPT | Performed by: INTERNAL MEDICINE

## 2024-12-12 PROCEDURE — 99183 HYPERBARIC OXYGEN THERAPY: CPT | Performed by: FAMILY MEDICINE

## 2024-12-12 PROCEDURE — 85025 COMPLETE CBC W/AUTO DIFF WBC: CPT | Performed by: EMERGENCY MEDICINE

## 2024-12-12 PROCEDURE — 99285 EMERGENCY DEPT VISIT HI MDM: CPT | Performed by: EMERGENCY MEDICINE

## 2024-12-12 PROCEDURE — 80053 COMPREHEN METABOLIC PANEL: CPT | Performed by: EMERGENCY MEDICINE

## 2024-12-12 PROCEDURE — 85730 THROMBOPLASTIN TIME PARTIAL: CPT | Performed by: EMERGENCY MEDICINE

## 2024-12-12 PROCEDURE — 82948 REAGENT STRIP/BLOOD GLUCOSE: CPT | Performed by: FAMILY MEDICINE

## 2024-12-12 PROCEDURE — 85379 FIBRIN DEGRADATION QUANT: CPT

## 2024-12-12 PROCEDURE — 36415 COLL VENOUS BLD VENIPUNCTURE: CPT | Performed by: EMERGENCY MEDICINE

## 2024-12-12 PROCEDURE — 93010 ELECTROCARDIOGRAM REPORT: CPT | Performed by: INTERNAL MEDICINE

## 2024-12-12 PROCEDURE — 99215 OFFICE O/P EST HI 40 MIN: CPT | Performed by: INTERNAL MEDICINE

## 2024-12-12 PROCEDURE — 85610 PROTHROMBIN TIME: CPT | Performed by: EMERGENCY MEDICINE

## 2024-12-12 RX ORDER — ATORVASTATIN CALCIUM 40 MG/1
40 TABLET, FILM COATED ORAL DAILY
Status: DISCONTINUED | OUTPATIENT
Start: 2024-12-13 | End: 2024-12-17 | Stop reason: HOSPADM

## 2024-12-12 RX ORDER — MAGNESIUM SULFATE HEPTAHYDRATE 40 MG/ML
2 INJECTION, SOLUTION INTRAVENOUS ONCE
Status: COMPLETED | OUTPATIENT
Start: 2024-12-12 | End: 2024-12-12

## 2024-12-12 RX ORDER — TORSEMIDE 20 MG/1
20 TABLET ORAL DAILY
Status: DISCONTINUED | OUTPATIENT
Start: 2024-12-13 | End: 2024-12-13

## 2024-12-12 RX ORDER — FENOFIBRATE 145 MG/1
145 TABLET, COATED ORAL DAILY
Status: DISCONTINUED | OUTPATIENT
Start: 2024-12-13 | End: 2024-12-17 | Stop reason: HOSPADM

## 2024-12-12 RX ORDER — INSULIN GLARGINE 100 [IU]/ML
15 INJECTION, SOLUTION SUBCUTANEOUS
Status: DISCONTINUED | OUTPATIENT
Start: 2024-12-12 | End: 2024-12-17 | Stop reason: HOSPADM

## 2024-12-12 RX ORDER — DULOXETIN HYDROCHLORIDE 30 MG/1
30 CAPSULE, DELAYED RELEASE ORAL DAILY
Status: DISCONTINUED | OUTPATIENT
Start: 2024-12-13 | End: 2024-12-17 | Stop reason: HOSPADM

## 2024-12-12 RX ORDER — PENTOXIFYLLINE 400 MG/1
400 TABLET, EXTENDED RELEASE ORAL
Status: DISCONTINUED | OUTPATIENT
Start: 2024-12-13 | End: 2024-12-17 | Stop reason: HOSPADM

## 2024-12-12 RX ORDER — PANTOPRAZOLE SODIUM 40 MG/1
40 TABLET, DELAYED RELEASE ORAL DAILY
Status: DISCONTINUED | OUTPATIENT
Start: 2024-12-13 | End: 2024-12-17 | Stop reason: HOSPADM

## 2024-12-12 RX ORDER — RANOLAZINE 500 MG/1
1000 TABLET, EXTENDED RELEASE ORAL 2 TIMES DAILY
Status: DISCONTINUED | OUTPATIENT
Start: 2024-12-12 | End: 2024-12-17 | Stop reason: HOSPADM

## 2024-12-12 RX ORDER — PREGABALIN 75 MG/1
150 CAPSULE ORAL 2 TIMES DAILY
Status: DISCONTINUED | OUTPATIENT
Start: 2024-12-12 | End: 2024-12-17 | Stop reason: HOSPADM

## 2024-12-12 RX ORDER — FUROSEMIDE 10 MG/ML
40 INJECTION INTRAMUSCULAR; INTRAVENOUS ONCE
Status: COMPLETED | OUTPATIENT
Start: 2024-12-12 | End: 2024-12-12

## 2024-12-12 RX ORDER — AMLODIPINE BESYLATE 5 MG/1
5 TABLET ORAL DAILY
Status: DISCONTINUED | OUTPATIENT
Start: 2024-12-13 | End: 2024-12-16

## 2024-12-12 RX ORDER — ACETAMINOPHEN 325 MG/1
650 TABLET ORAL EVERY 6 HOURS PRN
Status: DISCONTINUED | OUTPATIENT
Start: 2024-12-12 | End: 2024-12-17 | Stop reason: HOSPADM

## 2024-12-12 RX ORDER — ATROPINE SULFATE 0.1 MG/ML
1 INJECTION INTRAVENOUS ONCE
Status: DISCONTINUED | OUTPATIENT
Start: 2024-12-12 | End: 2024-12-12

## 2024-12-12 RX ORDER — CHLORAL HYDRATE 500 MG
2000 CAPSULE ORAL 2 TIMES DAILY
Status: DISCONTINUED | OUTPATIENT
Start: 2024-12-12 | End: 2024-12-17 | Stop reason: HOSPADM

## 2024-12-12 RX ORDER — INSULIN LISPRO 100 [IU]/ML
1-5 INJECTION, SOLUTION INTRAVENOUS; SUBCUTANEOUS
Status: DISCONTINUED | OUTPATIENT
Start: 2024-12-12 | End: 2024-12-17 | Stop reason: HOSPADM

## 2024-12-12 RX ORDER — ASPIRIN 81 MG/1
81 TABLET, CHEWABLE ORAL DAILY
Status: DISCONTINUED | OUTPATIENT
Start: 2024-12-13 | End: 2024-12-17 | Stop reason: HOSPADM

## 2024-12-12 RX ORDER — INSULIN LISPRO 100 [IU]/ML
1-6 INJECTION, SOLUTION INTRAVENOUS; SUBCUTANEOUS
Status: DISCONTINUED | OUTPATIENT
Start: 2024-12-12 | End: 2024-12-17 | Stop reason: HOSPADM

## 2024-12-12 RX ORDER — CLOPIDOGREL BISULFATE 75 MG/1
75 TABLET ORAL DAILY
Status: DISCONTINUED | OUTPATIENT
Start: 2024-12-13 | End: 2024-12-17 | Stop reason: HOSPADM

## 2024-12-12 RX ORDER — ENOXAPARIN SODIUM 100 MG/ML
40 INJECTION SUBCUTANEOUS DAILY
Status: DISCONTINUED | OUTPATIENT
Start: 2024-12-13 | End: 2024-12-17 | Stop reason: HOSPADM

## 2024-12-12 RX ADMIN — OMEGA-3 FATTY ACIDS CAP 1000 MG 2000 MG: 1000 CAP at 22:00

## 2024-12-12 RX ADMIN — INSULIN LISPRO 1 UNITS: 100 INJECTION, SOLUTION INTRAVENOUS; SUBCUTANEOUS at 22:10

## 2024-12-12 RX ADMIN — PREGABALIN 150 MG: 75 CAPSULE ORAL at 22:00

## 2024-12-12 RX ADMIN — MAGNESIUM SULFATE HEPTAHYDRATE 2 G: 40 INJECTION, SOLUTION INTRAVENOUS at 11:42

## 2024-12-12 RX ADMIN — RANOLAZINE 1000 MG: 500 TABLET, FILM COATED, EXTENDED RELEASE ORAL at 22:10

## 2024-12-12 RX ADMIN — FUROSEMIDE 40 MG: 10 INJECTION, SOLUTION INTRAMUSCULAR; INTRAVENOUS at 20:39

## 2024-12-12 RX ADMIN — SODIUM CHLORIDE 500 ML: 0.9 INJECTION, SOLUTION INTRAVENOUS at 12:18

## 2024-12-12 RX ADMIN — INSULIN GLARGINE 15 UNITS: 100 INJECTION, SOLUTION SUBCUTANEOUS at 22:01

## 2024-12-12 NOTE — ASSESSMENT & PLAN NOTE
patient sent from wound center due to bradycardia with pulse ox heart rate of 20 to 30  patient noted to have frequent PVCs in bigemy, and most likely pulse ox is not detecting the PVCs as they are not perfused in the periphery.  Will hold patient's clonidine and beta-blocker at this time and continue to monitor  continue monitor telemetry  will need monitor in the outpatient setting for PVC burden

## 2024-12-12 NOTE — ASSESSMENT & PLAN NOTE
Lab Results   Component Value Date    EGFR 45 12/12/2024    EGFR 42 10/14/2024    EGFR 53 09/07/2024    CREATININE 1.42 (H) 12/12/2024    CREATININE 1.51 (H) 10/14/2024    CREATININE 1.25 09/07/2024   continue to monitor

## 2024-12-12 NOTE — H&P
H&P - Hospitalist   Name: Thomas Horne 79 y.o. male I MRN: [id number]  Unit/Bed#: ED CT2 I Date of Admission: 12/12/2024   Date of Service: 12/12/2024 I Hospital Day: 0     Assessment & Plan  Bradycardia  Referred to ER from wound care center with concerns of bradycardia  EKG with normal sinus rhythm frequent PVCs in bigeminy  History of bradycardia, metoprolol was recently decreased by cardiology as outpatient  Concerned about pseudo bradycardia in setting of PVCs versus true  Hemodynamically stable e  Telemetry  Hold metoprolol, clonidine.  Avoid mars blocking agents  Check 2D echo  Cardiology evaluation  Coronary artery disease involving native coronary artery of native heart without angina pectoris  History of CAD status post CABG in 2002 and status post PCI with NANDINI distal left circumflex 2021  Continue aspirin, isosorbide, Ranexa, statin  Chronic HFpEF/Moderate pHTN (HFpEF) ([facility])  Wt Readings from Last 3 Encounters:   12/12/24 108 kg (238 lb 1.6 oz)   12/11/24 107 kg (236 lb)   12/11/24 107 kg (236 lb)     Echocardiogram 9/24-EF 50%, grade 2 diastolic dysfunction, moderate to severe aortic stenosis,  Patient reports symptoms of shortness of breath at times and with activity.  proBNP 1067  Patient denies any worsening of leg edema or weight gain.  Appears compensated  Check chest x-ray  Check 2D echo  Holding metoprolol currently secondary to bradycardia  Continue Jardiance, torsemide  Monitor intake output, daily weight  Fluid and salt restriction  Follow-up cardiology recommendations  Primary hypertension  Blood pressure stable  Continue amlodipine, isosorbide, torsemide  Holding metoprolol, clonidine in setting of bradycardia  Monitor blood pressure trend  Type 2 diabetes mellitus with diabetic polyneuropathy, with long-term current use of insulin ([facility])  Lab Results   Component Value Date    HGBA1C 6.2 (H) 10/14/2024       Recent Labs     12/11/24  0811 12/11/24  1036 12/12/24  0808 12/12/24  1020   POCGLU  227* 267* 169* 185*       Blood Sugar Average: Last 72 hrs:    Home regimen, Jardiance, Januvia, Lantus 15 units nightly  Continue Jardiance, Januvia  Continue glargine 15 units nightly  Insulin sliding scale  Diabetic diet  Monitor blood glucose trend  Stage 3b chronic kidney disease (HCC)  With baseline creatinine 1.6/1.8  Currently at baseline  Monitor  Peripheral artery disease (HCC)  Continue aspirin, clopidogrel, pentoxifylline  Multiple open wounds of lower extremity  In setting of peripheral artery disease and diabetes, currently undergoing HBO2 treatment  Mixed hyperlipidemia  Continue atorvastatin  S/P AAA repair    H/O prostate cancer  Status post prostatectomy  Iron deficiency anemia  History of iron deficiency anemia, suspected secondary to GI loss  Status post IV iron infusion  Now improved  History of DVT (deep vein thrombosis)  History of acute nonocclusive DVT of left gastrocnemius and peroneal vein, 4/24  Likely provoked in setting of preceding hospitalization  Patient was treated with Coumadin  DVT prophylaxis      VTE Pharmacologic Prophylaxis: VTE Score: 4 Moderate Risk (Score 3-4) - Pharmacological DVT Prophylaxis Ordered: enoxaparin (Lovenox).  Code Status: Level 3 - DNAR and DNI   Discussion with family: Updated  (family) at bedside.    Anticipated Length of Stay: Patient will be admitted on an observation basis with an anticipated length of stay of less than 2 midnights secondary to evaluation for bradycardia.    History of Present Illness   Chief Complaint: Referred for slow heart rate    Thomas Horne is a 79 y.o. male with a PMH of with history of CAD/MI status post CABG, hypertension, diabetes mitis type II, dyslipidemia CKD, SUSAN not on CPAP, peripheral artery disease, lower extremity wounds currently on hyperbaric, history of DVT, who was referred to ED from wound care center where he went to get his scheduled hyperbaric treatment with concerns of bradycardia.  His heart  rate was noted to be fluctuating 28 to 61 bpm.  In ED, patient was afebrile noted to be bradycardic, initially blood pressure was difficult to be obtained but subsequently noted to be within normal limit patient was saturating adequately on room air.  Patient reported that he has ongoing symptoms of intermittent lightheadedness dizziness over last few months.  He also reported decrease exercise capacity due to fatigue and shortness of breath with exertion which he attributed partly due to deconditioning from poor mobility.  Patient denied any chest pain but a reported feeling mild chest tightness with exertion at times.  Patient denied any palpitation, syncope, weight gain ,lower extremity edema.  In ED, workup revealed mild leukocytosis, proBNP was elevated similar to prior.  EKG reveals sinus rhythm with PVCs and bigeminy.  Case was discussed with cardiology and patient was subsequently admitted for further observation and evaluation.    Review of Systems   Constitutional:  Positive for activity change and fatigue. Negative for appetite change and fever.   HENT:  Negative for congestion.    Respiratory:  Positive for shortness of breath (With activity). Negative for chest tightness.    Cardiovascular:  Negative for chest pain, palpitations and leg swelling.   Gastrointestinal:  Negative for abdominal pain, nausea and vomiting.   Genitourinary:  Negative for difficulty urinating.   Musculoskeletal:  Positive for gait problem.   Skin:  Positive for wound.   Neurological:  Positive for light-headedness. Negative for syncope.   Psychiatric/Behavioral:  Negative for confusion.        Historical Information   Past Medical History:   Diagnosis Date    Anemia     CAD (coronary artery disease)     Callus     Cancer (HCC)     prostate    CHF (congestive heart failure) (HCC)     Chronic kidney disease     Clotting disorder (HCC)     Coronary artery disease 1988    Deep vein thrombosis (HCC)     Diabetes mellitus (HCC)      Difficulty walking     Duodenal ulcer     Ear problems     Heart disease 1988    HL (hearing loss)     Hyperlipidemia     Hypertension     Myocardial infarction (HCC)     Neuropathy     Bilateral feet    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)     Plantar fasciitis     Sleep apnea     Could not tolerate CPAP     Past Surgical History:   Procedure Laterality Date    ABDOMINAL AORTIC ANEURYSM REPAIR      Stented    ADENOIDECTOMY      BACK SURGERY  1985    CARDIAC CATHETERIZATION Left 10/19/2022    Procedure: Cardiac Left Heart Cath;  Surgeon: Danielle Pereira MD;  Location: [facility];  Service: Cardiology    CARDIAC SURGERY  2002    3 cardiac bypass then angioplasty 7/2020    CHOLECYSTECTOMY      COLONOSCOPY  02/14/2024    CORONARY ARTERY BYPASS GRAFT      IR LOWER EXTREMITY ANGIOGRAM  11/01/2023    IR LOWER EXTREMITY ANGIOGRAM  08/07/2024    LAMINECTOMY  1990    NH BYPASS W/VEIN FEMORAL-POPLITEAL Right 11/16/2023    Procedure: BYPASS FEMORAL-POPLITEAL WITH CRYO VEIN, RIGHT FEMORAL ENDARTERECTOMY;  Surgeon: Vasquez Clark MD;  Location: [facility];  Service: Vascular    NH BYPASS W/VEIN FEMORAL-POPLITEAL Left 8/30/2024    Procedure: left lower extremity above knee popliteal to below knee popliteal artery bypass with PTFE graft;  Surgeon: Vasquez Clark MD;  Location: [facility];  Service: Vascular    NH SLCTV CATHJ 3RD+ ORD SLCTV ABDL PEL/LXTR BRNC Right 11/01/2023    Procedure: ARTERIOGRAM Right lower extremity arteriogram with CO2 via right groin access;  Surgeon: Vasquez Clark MD;  Location: [facility];  Service: Vascular    NH SLCTV CATHJ 3RD+ ORD SLCTV ABDL PEL/LXTR BRNCH Left 08/07/2024    Procedure: diagnostic LLE Arteriogram;  Surgeon: Vasquez Clark MD;  Location: [facility];  Service: Vascular    PROSTATE SURGERY      TONSILLECTOMY      URINARY SPHINCTER IMPLANT       Social History     Tobacco Use    Smoking status: Former     Current packs/day: 0.00      Average packs/day: 1.5 packs/day for 33.0 years (49.5 ttl pk-yrs)     Types: Cigarettes     Start date: 1956     Quit date:      Years since quittin.9     Passive exposure: Past    Smokeless tobacco: Never   Vaping Use    Vaping status: Never Used   Substance and Sexual Activity    Alcohol use: Yes     Alcohol/week: 5.0 standard drinks of alcohol     Types: 5 Cans of beer per week     Comment: stopped last few months    Drug use: Never    Sexual activity: Not Currently     Partners: Female     Birth control/protection: Male Sterilization     E-Cigarette/Vaping    E-Cigarette Use Never User      E-Cigarette/Vaping Substances    Nicotine No     THC No     CBD No     Flavoring No     Other No     Unknown No      Family History   Problem Relation Age of Onset    Diabetes Mother     Alcohol abuse Father     Heart disease Brother     Cancer Sister         Thyroid    Cancer Sister         Colon    Cancer Brother         Throat    Thyroid disease Brother     Cancer Brother     Diabetes Sister     Thyroid disease Sister     Mental illness Neg Hx      Social History:  Marital Status:    Occupation: Retired  Patient Pre-hospital Living Situation: Home  Patient Pre-hospital Level of Mobility:  Limited mobility due to lower extremity wounds and fatigue  Patient Pre-hospital Diet Restrictions: Cardiac, diabetic    Meds/Allergies   I have reviewed home medications with patient personally.  Prior to Admission medications    Medication Sig Start Date End Date Taking? Authorizing Provider   amLODIPine (NORVASC) 10 mg tablet Take 0.5 tablets (5 mg total) by mouth daily 24  Yes Naa Cruz PA-C   aspirin 81 mg chewable tablet Chew 81 mg daily   Yes Historical Provider, MD   atorvastatin (LIPITOR) 40 mg tablet TAKE 1 TABLET BY MOUTH DAILY 24  Yes Naa Cruz PA-C   cloNIDine (CATAPRES) 0.1 mg tablet take 1 tablet by mouth every 12 hours 24  Yes Frank Lombardi, DO   clopidogrel (PLAVIX) 75 mg tablet  Take 1 tablet (75 mg total) by mouth daily 9/4/24  Yes APOLLO Abrams   DULoxetine (CYMBALTA) 30 mg delayed release capsule Take 1 capsule (30 mg total) by mouth daily 12/4/24  Yes Sundar Arango DPM   Empagliflozin (Jardiance) 25 MG TABS TAKE 1 TABLET BY MOUTH DAILY 7/5/24  Yes Frank Lombardi, DO   fenofibrate 160 MG tablet TAKE 1 TABLET BY MOUTH DAILY 7/17/24  Yes Danielle Pereira MD   insulin glargine (LANTUS) 100 units/mL subcutaneous injection Inject 15 Units under the skin daily at bedtime 11/21/23  Yes APOLLO Kwong   isosorbide mononitrate (IMDUR) 30 mg 24 hr tablet TAKE 3 TABLETS BY MOUTH DAILY 9/27/24  Yes Naa Cruz PA-C   metFORMIN (GLUCOPHAGE) 500 mg tablet TAKE 1 TABLET BY MOUTH TWICE  DAILY WITH MEALS 12/4/24  Yes Frank Lombardi, DO   metoprolol tartrate (LOPRESSOR) 25 mg tablet Take 0.5 tablets (12.5 mg total) by mouth every 12 (twelve) hours 12/4/24 3/4/25 Yes Danielle Pereira MD   Multiple Vitamins-Minerals (MULTIVITAMIN MEN 50+ PO) Take by mouth daily   Yes Historical Provider, MD   Omega-3 Fatty Acids (fish oil) 1,000 mg Take 4,000 mg by mouth 2 (two) times a day   Yes Historical Provider, MD   pantoprazole (PROTONIX) 40 mg tablet TAKE 1 TABLET BY MOUTH DAILY 9/30/24  Yes Tj Paulino PA-C   pentoxifylline (TRENtal) 400 mg ER tablet TAKE 1 TABLET BY MOUTH 3 TIMES  DAILY WITH MEALS 9/16/24  Yes Sundar Arango DPM   pregabalin (LYRICA) 150 mg capsule Take 1 capsule (150 mg total) by mouth 3 (three) times a day 10/10/24 1/8/25 Yes Kirk Acuna MD   ranolazine (RANEXA) 1000 MG SR tablet Take 1 tablet (1,000 mg total) by mouth 2 (two) times a day 5/10/24 5/5/25 Yes Naa Cruz PA-C   sodium hypochlorite (DAKIN'S HALF-STRENGTH) external solution Apply 1 Application topically every other day At each dressing change 10/15/24  Yes Nitza Sotelo,    torsemide (DEMADEX) 20 mg tablet TAKE 1 TABLET BY MOUTH DAILY 11/27/24  Yes Danielle Pereira MD   acetaminophen  (TYLENOL) 325 mg tablet Take 2 tablets (650 mg total) by mouth every 6 (six) hours as needed for mild pain 9/4/24   APOLLO Abrams   Blood Glucose Monitoring Suppl (OneTouch Verio Reflect) w/Device KIT Check blood sugars twice daily. Please substitute with appropriate alternative as covered by patient's insurance. Dx: E11.65 2/22/22   Frank Lombardi, DO   Droplet Pen Needles 32G X 4 MM MISC USE EVERY EVENING 9/27/23   Frank Lombardi, DO   glucose blood (OneTouch Verio) test strip TEST TWICE A DAY 9/24/24   Frank Lombardi, DO   ketoconazole (NIZORAL) 2 % cream Apply topically daily 7/9/24 10/17/24  Sundar Arango DPM   nitroglycerin (NITROSTAT) 0.4 mg SL tablet Place 1 tablet (0.4 mg total) under the tongue every 5 (five) minutes as needed for chest pain 5/10/24 12/11/24  Naa Cruz PA-C   ondansetron (ZOFRAN) 8 mg tablet Take 0.5 tablets (4 mg total) by mouth every 6 (six) hours as needed for nausea or vomiting  Patient not taking: Reported on 12/12/2024 9/7/24   Silvia Steve PA-C   OneTouch Delica Lancets 33G MISC Check blood sugars twice daily. Please substitute with appropriate alternative as covered by patient's insurance. Dx: E11.65 2/22/22   Frank Lombardi, DO     Allergies   Allergen Reactions    Lisinopril Rash and Lip Swelling       Objective :  Temp:  [97.1 °F (36.2 °C)-97.2 °F (36.2 °C)] 97.1 °F (36.2 °C)  HR:  [27-73] 73  BP: (0-163)/(0-84) 143/66  Resp:  [16-25] 25  SpO2:  [95 %-100 %] 96 %  O2 Device: None (Room air)    Physical Exam  Constitutional:       General: He is not in acute distress.     Comments: Chronically ill, comfortable   HENT:      Head: Normocephalic and atraumatic.   Eyes:      Pupils: Pupils are equal, round, and reactive to light.   Cardiovascular:      Rate and Rhythm: Normal rate.   Pulmonary:      Effort: Pulmonary effort is normal. No respiratory distress.      Breath sounds: Examination of the right-lower field reveals rales. Decreased breath sounds and rales  present. No rhonchi.   Abdominal:      General: Bowel sounds are normal. There is no distension.      Palpations: Abdomen is soft.      Tenderness: There is no abdominal tenderness. There is no guarding or rebound.   Musculoskeletal:      Comments: Lower extremity dressing in place,  Trace lower extremity edema   Skin:     General: Skin is warm and dry.      Findings: No rash.   Neurological:      Mental Status: He is alert. Mental status is at baseline.      Cranial Nerves: No cranial nerve deficit.                Lines/Drains:            Lab Results: I have reviewed the following results:  Results from last 7 days   Lab Units 12/12/24  1142   WBC Thousand/uL 10.89*   HEMOGLOBIN g/dL 12.9   HEMATOCRIT % 41.3   PLATELETS Thousands/uL 199   SEGS PCT % 79*   LYMPHO PCT % 13*   MONO PCT % 6   EOS PCT % 2     Results from last 7 days   Lab Units 12/12/24  1142   SODIUM mmol/L 137   POTASSIUM mmol/L 5.0   CHLORIDE mmol/L 104   CO2 mmol/L 29   BUN mg/dL 30*   CREATININE mg/dL 1.42*   ANION GAP mmol/L 4   CALCIUM mg/dL 9.3   ALBUMIN g/dL 4.0   TOTAL BILIRUBIN mg/dL 0.67   ALK PHOS U/L 37   ALT U/L 13   AST U/L 15   GLUCOSE RANDOM mg/dL 142*     Results from last 7 days   Lab Units 12/12/24  1142   INR  1.08     Results from last 7 days   Lab Units 12/12/24  1020 12/12/24  0808 12/11/24  1036 12/11/24  0811 12/10/24  1029 12/10/24  0804 12/09/24  1020 12/09/24  0808 12/06/24  1023 12/06/24  0811   POC GLUCOSE mg/dl 185* 169* 267* 227* 203* 237* 199* 222* 216* 256*     Lab Results   Component Value Date    HGBA1C 6.2 (H) 10/14/2024    HGBA1C 7.0 (H) 09/03/2024    HGBA1C 7.0 (H) 04/17/2024           Imaging Results Review: No pertinent imaging studies reviewed.  Other Study Results Review: EKG was reviewed.     Administrative Statements   I have spent a total time of 45 minutes in caring for this patient on the day of the visit/encounter including Diagnostic results, Instructions for management, Patient and family education,  Documenting in the medical record, Reviewing / ordering tests, medicine, procedures  , Obtaining or reviewing history  , and Communicating with other healthcare professionals .    ** Please Note: This note has been constructed using a voice recognition system. **

## 2024-12-12 NOTE — PROGRESS NOTES
HBO Treatment Course Details       Treatment Notes: Treatment tolerated well. No complaints of pain or discomfort.  Patients heart rate is fluctuating from 28-61 bpm.  Physician recommendend ER evaluation for heart rate.     Treatment Course Number: 30  Total Treatments Ordered:  40     Diagnosis:   1. Diabetic ulcer of left foot associated with type 2 diabetes mellitus, with muscle involvement without evidence of necrosis, unspecified part of foot (HCC)  Hyperbaric oxygen thearpy          HBO Treatment Details:  In-Patient Visit: no  Treatment Length:90 Minutes(Minutes)  Chamber #: Hard sided Monoplace Chamber    Pre-Treatment details:  Pre-treatment protocol Treatment Protocol: 2.5 INDY X 90 minutes w/ 100% oxygen, two 5 minute air breaks                                             Treatment details:  INDY Rate: 2.5  Started Compression: 0819  Reached Compression: 0829  Total Compression Time: 10 (Minutes)  Total Holding Time: 100 (Minutes)  Started Decompression: 1009  Reached Surface: 1019  Total Decompression Time: 10 (Minutes)  Total Airbreaks:   (Minutes)  Total Time of Treatment: 120 (Minutes)  Symptoms Noted During Treatment: None (Minutes)    Post treatment details:                                                    Vital Signs:  HBO Glucose Reference Range: 100-350 mg/dl   Pre-Treatment Post-Treatment   Time vitals are taken: 0805 Time vitals are taken: 1030   Blood Pressure: 163/84 Blood Pressure: 132/60   Pulse: 66 Pulse: (!) 42 (HEART RATE FLUCUATING FROM 28-61.  MD AWARE AND RECOMMENDED ER EVALUATION)   Resp: 18 Resp: 18   Temp: 97.2 °F (36.2 °C) Temp: 97.2 °F (36.2 °C)   Pre-Inspection Glucose readin Post-Inspection Glucose readin       Allergies   Allergen Reactions    Lisinopril Rash and Lip Swelling     Patient Active Problem List    Diagnosis Date Noted    Foot ulcer (HCC) 2024    SOB (shortness of breath) 2024    Nausea 2024    Elevated troponin 2024    History  of DVT (deep vein thrombosis) 08/26/2024    Diabetic ulcer of toe of left foot associated with type 2 diabetes mellitus, limited to breakdown of skin ([facility]) 08/08/2024    Plantar fasciitis, right 07/10/2024    Acute deep vein thrombosis (DVT) of left peroneal vein ([facility]) 05/01/2024    Iron deficiency anemia 04/23/2024    Shortness of breath 04/09/2024    History of colon polyps 02/22/2024    Duodenal ulcer 02/01/2024    Multiple gastric ulcers 12/27/2023    Iron deficiency anemia due to chronic blood loss 12/27/2023    Melena 12/26/2023    Symptomatic anemia 12/26/2023    Frequent PVCs 06/17/2023    Acute on chronic diastolic heart failure ([facility]) 06/16/2023    Stage 3b chronic kidney disease ([facility]) 06/16/2023    Diabetic ulcer of right midfoot associated with diabetes mellitus due to underlying condition, limited to breakdown of skin ([facility]) 06/15/2023    Hypertensive urgency 05/11/2023    Elevated troponin level not due myocardial infarction 05/11/2023    Stable angina pectoris ([facility]) 03/07/2023    Chronic kidney disease-mineral and bone disorder 11/30/2022    Mild aortic stenosis 11/11/2022    Chronic HFpEF/Moderate pHTN (HFpEF) ([facility]) 11/10/2022    Gross hematuria 07/26/2022    Platelets decreased ([facility]) 07/22/2022    Acute kidney injury superimposed on chronic kidney disease  ([facility]) 02/16/2022    Actinic keratoses 01/14/2022    Type 2 diabetes mellitus with diabetic peripheral angiopathy without gangrene, with long-term current use of insulin ([facility]) 01/11/2022    Varicose veins of left lower extremity 06/25/2021    History of endovascular stent graft for abdominal aortic aneurysm (AAA) 06/25/2021    Bruit (arterial) 06/25/2021    Peripheral artery disease (HCC) 06/25/2021    Type 2 diabetes mellitus with diabetic polyneuropathy, with long-term current use of insulin ([facility]) 06/10/2021    Mixed hyperlipidemia 05/11/2021    Primary hypertension 05/11/2021    Coronary artery disease involving native coronary artery of native  heart without angina pectoris 05/11/2021    S/P angioplasty with stent 05/11/2021    Hx of CABG 05/11/2021    S/P AAA repair 05/11/2021    S/P prostatectomy 05/11/2021    H/O prostate cancer 05/11/2021     No orders of the defined types were placed in this encounter.

## 2024-12-12 NOTE — ED PROVIDER NOTES
Time reflects when diagnosis was documented in both MDM as applicable and the Disposition within this note       Time User Action Codes Description Comment    12/12/2024 11:39 AM Cindi Spears Add [R00.1] Bradycardia     12/12/2024 12:44 PM [email or web address] [email or web address] Add [R00.1] Symptomatic bradycardia     12/12/2024 12:44 PM Cindi Spears Add [I49.8] Bigeminy     12/12/2024 12:44 PM Cindi Spears Add [I49.3] PVC (premature ventricular contraction)           ED Disposition       ED Disposition   Admit    Condition   Stable    Date/Time   u Dec 12, 2024 12:45 PM    Comment   Case was discussed with Dr. Davalos and the patient's admission status was agreed to be Admission Status: observation status to the service of Dr. Davalos.               Assessment & Plan       Medical Decision Making  Obtain blood work  Give IV fluids, magnesium and continue to monitor patient for any worsening symptoms  Consult cardiology    Patient presents to the ED with symptomatic anemia.  Patient was eval by cardiology team.  PVCs noted on EKGs that are unchanged from previous study.  Patient's beta-blocker was recently decreased for feeling tired by his cardiologist.  At this time patient will be admitted for further evaluation and management of his symptomatic bradycardia.  Patient agrees with admission plan.    Amount and/or Complexity of Data Reviewed  External Data Reviewed: labs and ECG.  Labs: ordered. Decision-making details documented in ED Course.  ECG/medicine tests: ordered and independent interpretation performed. Decision-making details documented in ED Course.    Risk  Prescription drug management.  Decision regarding hospitalization.        ED Course as of 12/12/24 1340   Thu Dec 12, 2024   1140 Cardiology consulted who will see patient at bedside.   1245 Patient was seen by cardiology team who noted that patient's beta-blocker was recently decreased to symptomatic bradycardia.  Patient had an episode of symptomatic  bradycardia where he felt lightheaded and his heart rate was found to be 27 at wound care.  At this time patient will be admitted overnight for observation.       Medications   atropine 1 mg/10 mL injection 1 mg (1 mg Intravenous Not Given 12/12/24 1135)   magnesium sulfate 2 g/50 mL IVPB (premix) 2 g (0 g Intravenous Stopped 12/12/24 1242)   sodium chloride 0.9 % bolus 500 mL (500 mL Intravenous New Bag 12/12/24 1218)       ED Risk Strat Scores                          SBIRT 20yo+      Flowsheet Row Most Recent Value   Initial Alcohol Screen: US AUDIT-C     1. How often do you have a drink containing alcohol? 1 Filed at: 12/12/2024 1122   2. How many drinks containing alcohol do you have on a typical day you are drinking?  0 Filed at: 12/12/2024 1122   3b. FEMALE Any Age, or MALE 65+: How often do you have 4 or more drinks on one occassion? 0 Filed at: 12/12/2024 1122   Audit-C Score 1 Filed at: 12/12/2024 1122   EUGENE: How many times in the past year have you...    Used an illegal drug or used a prescription medication for non-medical reasons? Never Filed at: 12/12/2024 1122                            History of Present Illness       Chief Complaint   Patient presents with    Slow Heart Rate     States he was at wound care in hyperbaric chamber and is HR was as low as 28 and states sent to ED. States he gets dizzy when he closes his eyes. No chest pain. EKG in progress       Past Medical History:   Diagnosis Date    Anemia     CAD (coronary artery disease)     Callus     Cancer (HCC)     prostate    CHF (congestive heart failure) (HCC)     Chronic kidney disease     Clotting disorder (HCC)     Coronary artery disease 1988    Deep vein thrombosis (HCC)     Diabetes mellitus (HCC)     Difficulty walking     Duodenal ulcer     Ear problems     Heart disease 1988    HL (hearing loss)     Hyperlipidemia     Hypertension     Myocardial infarction (HCC)     Neuropathy     Bilateral feet    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)      Plantar fasciitis     Sleep apnea     Could not tolerate CPAP      Past Surgical History:   Procedure Laterality Date    ABDOMINAL AORTIC ANEURYSM REPAIR      Stented    ADENOIDECTOMY      BACK SURGERY  1985    CARDIAC CATHETERIZATION Left 10/19/2022    Procedure: Cardiac Left Heart Cath;  Surgeon: Danielle Pereira MD;  Location: [facility];  Service: Cardiology    CARDIAC SURGERY  2002    3 cardiac bypass then angioplasty 7/2020    CHOLECYSTECTOMY      COLONOSCOPY  02/14/2024    CORONARY ARTERY BYPASS GRAFT      IR LOWER EXTREMITY ANGIOGRAM  11/01/2023    IR LOWER EXTREMITY ANGIOGRAM  08/07/2024    LAMINECTOMY  1990    SC BYPASS W/VEIN FEMORAL-POPLITEAL Right 11/16/2023    Procedure: BYPASS FEMORAL-POPLITEAL WITH CRYO VEIN, RIGHT FEMORAL ENDARTERECTOMY;  Surgeon: Vasquez Clark MD;  Location: [facility];  Service: Vascular    SC BYPASS W/VEIN FEMORAL-POPLITEAL Left 8/30/2024    Procedure: left lower extremity above knee popliteal to below knee popliteal artery bypass with PTFE graft;  Surgeon: Vasquez Clark MD;  Location: [facility];  Service: Vascular    SC SLCTV CATHJ 3RD+ ORD SLCTV ABDL PEL/LXTR BRNCH Right 11/01/2023    Procedure: ARTERIOGRAM Right lower extremity arteriogram with CO2 via right groin access;  Surgeon: Vasquez Clark MD;  Location: [facility];  Service: Vascular    SC SLCTV CATHJ 3RD+ ORD SLCTV ABDL PEL/LXTR BRNCH Left 08/07/2024    Procedure: diagnostic LLE Arteriogram;  Surgeon: Vasquez Clark MD;  Location: [facility];  Service: Vascular    PROSTATE SURGERY      TONSILLECTOMY      URINARY SPHINCTER IMPLANT        Family History   Problem Relation Age of Onset    Diabetes Mother     Alcohol abuse Father     Heart disease Brother     Cancer Sister         Thyroid    Cancer Sister         Colon    Cancer Brother         Throat    Thyroid disease Brother     Cancer Brother     Diabetes Sister     Thyroid disease Sister     Mental  illness Neg Hx       Social History     Tobacco Use    Smoking status: Former     Current packs/day: 0.00     Average packs/day: 1.5 packs/day for 33.0 years (49.5 ttl pk-yrs)     Types: Cigarettes     Start date: 1956     Quit date:      Years since quittin.9     Passive exposure: Past    Smokeless tobacco: Never   Vaping Use    Vaping status: Never Used   Substance Use Topics    Alcohol use: Yes     Alcohol/week: 5.0 standard drinks of alcohol     Types: 5 Cans of beer per week     Comment: stopped last few months    Drug use: Never      E-Cigarette/Vaping    E-Cigarette Use Never User       E-Cigarette/Vaping Substances    Nicotine No     THC No     CBD No     Flavoring No     Other No     Unknown No       I have reviewed and agree with the history as documented.     1-year-old male with past history of insulin-dependent diabetes, bilateral chronic wound of lower extremity, CHF, MI, CAD, CKD, SUSAN not tolerating CPAP at home, diabetic neuropathy, presents to the ED for evaluation of bradycardia.  Patient was at wound clinic earlier today.  Patient was in hyperbaric chamber for his wound.  While in the hyperbaric chamber, patient became bradycardic and noted to have heart rate of 27.  Patient did feel some lightheadedness at the time.  Subsequently patient brought up to the ED from wound care by wound care staff.  In the ED patient initially had heart rate of 28 and he felt lightheaded.  By the time patient was roomed heart rate improved to 55 and patient was starting to feel better.  Patient denies any recent fevers, chills, cold symptoms or any other concerns at home.      History provided by:  Patient      Review of Systems   Constitutional:  Negative for chills and fever.   HENT:  Negative for ear pain and sore throat.    Eyes:  Negative for pain and visual disturbance.   Respiratory:  Negative for cough and shortness of breath.    Cardiovascular:  Negative for chest pain and palpitations.    Gastrointestinal:  Negative for abdominal pain and vomiting.   Genitourinary:  Negative for dysuria and hematuria.   Musculoskeletal:  Negative for arthralgias and back pain.   Skin:  Negative for color change and rash.   Neurological:  Positive for light-headedness. Negative for seizures and syncope.   All other systems reviewed and are negative.          Objective       ED Triage Vitals   Temperature Pulse Blood Pressure Respirations SpO2 Patient Position - Orthostatic VS   12/12/24 1123 12/12/24 1123 12/12/24 1123 12/12/24 1123 12/12/24 1123 12/12/24 1123   (!) 97.1 °F (36.2 °C) (!) 27 (!) 0/0 18 95 % Sitting      Temp Source Heart Rate Source BP Location FiO2 (%) Pain Score    12/12/24 1123 12/12/24 1123 12/12/24 1123 -- 12/12/24 1135    Tympanic Monitor Left arm  No Pain      Vitals      Date and Time Temp Pulse SpO2 Resp BP Pain Score FACES Pain Rating User   12/12/24 1330 -- 53 100 % 16 144/67 -- -- SF   12/12/24 1215 -- 55 96 % 21 133/63 -- -- SF   12/12/24 1200 -- 54 96 % 16 116/56 -- -- SF   12/12/24 1145 -- 54 96 % 21 123/58 -- -- SF   12/12/24 1135 -- 56 97 % 18 142/66 No Pain --    12/12/24 1123 97.1 °F (36.2 °C) 27 95 % 18 0/0 unable to get BP -- -- SW            Physical Exam  Vitals and nursing note reviewed.   Constitutional:       General: He is not in acute distress.     Appearance: He is well-developed.   HENT:      Head: Normocephalic and atraumatic.   Eyes:      Conjunctiva/sclera: Conjunctivae normal.   Cardiovascular:      Rate and Rhythm: Normal rate and regular rhythm.      Heart sounds: No murmur heard.  Pulmonary:      Effort: Pulmonary effort is normal. No respiratory distress.      Breath sounds: Normal breath sounds.   Abdominal:      Palpations: Abdomen is soft.      Tenderness: There is no abdominal tenderness.   Musculoskeletal:         General: No swelling.      Cervical back: Neck supple.   Skin:     General: Skin is warm and dry.      Capillary Refill: Capillary refill takes  less than 2 seconds.   Neurological:      General: No focal deficit present.      Mental Status: He is alert and oriented to person, place, and time.      Comments: Generalized weakness noted without any focal neurodeficits.   Psychiatric:         Mood and Affect: Mood normal.         Results Reviewed       Procedure Component Value Units Date/Time    TSH, 3rd generation with Free T4 reflex [[id number]]  (Normal) Collected: 12/12/24 1142    Lab Status: Final result Specimen: Blood from Arm, Right Updated: 12/12/24 1326     TSH 3RD GENERATON 1.890 uIU/mL     Protime-INR [[id number]]  (Normal) Collected: 12/12/24 1142    Lab Status: Final result Specimen: Blood from Arm, Right Updated: 12/12/24 1236     Protime 14.6 seconds      INR 1.08    Narrative:      INR Therapeutic Range    Indication                                             INR Range      Atrial Fibrillation                                               2.0-3.0  Hypercoagulable State                                    2.0.2.3  Left Ventricular Asist Device                            2.0-3.0  Mechanical Heart Valve                                  -    Aortic(with afib, MI, embolism, HF, LA enlargement,    and/or coagulopathy)                                     2.0-3.0 (2.5-3.5)     Mitral                                                             2.5-3.5  Prosthetic/Bioprosthetic Heart Valve               2.0-3.0  Venous thromboembolism (VTE: VT, PE        2.0-3.0    APTT [[id number]]  (Abnormal) Collected: 12/12/24 1142    Lab Status: Final result Specimen: Blood from Arm, Right Updated: 12/12/24 1236     PTT 35 seconds     B-Type Natriuretic Peptide(BNP) [[id number]]  (Abnormal) Collected: 12/12/24 1142    Lab Status: Final result Specimen: Blood from Arm, Right Updated: 12/12/24 1227     BNP 1,067 pg/mL     Comprehensive metabolic panel [[id number]]  (Abnormal) Collected: 12/12/24 1142    Lab Status: Final result Specimen: Blood from Arm, Right Updated:  12/12/24 1224     Sodium 137 mmol/L      Potassium 5.0 mmol/L      Chloride 104 mmol/L      CO2 29 mmol/L      ANION GAP 4 mmol/L      BUN 30 mg/dL      Creatinine 1.42 mg/dL      Glucose 142 mg/dL      Calcium 9.3 mg/dL      AST 15 U/L      ALT 13 U/L      Alkaline Phosphatase 37 U/L      Total Protein 7.4 g/dL      Albumin 4.0 g/dL      Total Bilirubin 0.67 mg/dL      eGFR 45 ml/min/1.73sq m     Narrative:      National Kid[employer] Foundation guidelines for Chronic Kidney Disease (CKD):     Stage 1 with normal or high GFR (GFR > 90 mL/min/1.73 square meters)    Stage 2 Mild CKD (GFR = 60-89 mL/min/1.73 square meters)    Stage 3A Moderate CKD (GFR = 45-59 mL/min/1.73 square meters)    Stage 3B Moderate CKD (GFR = 30-44 mL/min/1.73 square meters)    Stage 4 Severe CKD (GFR = 15-29 mL/min/1.73 square meters)    Stage 5 End Stage CKD (GFR <15 mL/min/1.73 square meters)  Note: GFR calculation is accurate only with a steady state creatinine    Magnesium [[id number]]  (Normal) Collected: 12/12/24 1142    Lab Status: Final result Specimen: Blood from Arm, Right Updated: 12/12/24 1224     Magnesium 2.2 mg/dL     HS Troponin I 2hr [[id number]]     Lab Status: No result Specimen: Blood     HS Troponin I 4hr [[id number]]     Lab Status: No result Specimen: Blood     HS Troponin 0hr (reflex protocol) [[id number]]  (Normal) Collected: 12/12/24 1142    Lab Status: Final result Specimen: Blood from Arm, Right Updated: 12/12/24 1217     hs TnI 0hr 35 ng/L     CBC and differential [[id number]]  (Abnormal) Collected: 12/12/24 1142    Lab Status: Final result Specimen: Blood from Arm, Right Updated: 12/12/24 1154     WBC 10.89 Thousand/uL      RBC 4.71 Million/uL      Hemoglobin 12.9 g/dL      Hematocrit 41.3 %      MCV 88 fL      MCH 27.4 pg      MCHC 31.2 g/dL      RDW 14.4 %      MPV 11.6 fL      Platelets 199 Thousands/uL      nRBC 0 /100 WBCs      Segmented % 79 %      Immature Grans % 0 %      Lymphocytes % 13 %      Monocytes %  6 %      Eosinophils Relative 2 %      Basophils Relative 0 %      Absolute Neutrophils 8.57 Thousands/µL      Absolute Immature Grans 0.04 Thousand/uL      Absolute Lymphocytes 1.44 Thousands/µL      Absolute Monocytes 0.66 Thousand/µL      Eosinophils Absolute 0.16 Thousand/µL      Basophils Absolute 0.02 Thousands/µL     UA w Reflex to Microscopic w Reflex to Culture [[id number]]     Lab Status: No result Specimen: Urine             No orders to display       ECG 12 Lead Documentation Only    Date/Time: 12/12/2024 1:24 PM    Performed by: Cindi Spears DO  Authorized by: Cindi Spears DO    Indications / Diagnosis:  Weakness  ECG reviewed by me, the ED Provider: yes    Patient location:  ED  Previous ECG:     Previous ECG:  Compared to current    Similarity:  No change    Comparison to cardiac monitor: Yes    Interpretation:     Interpretation: abnormal    Comments:      Rhythm, rate 55, left axis deviation, first-degree AV block, PVC with bigeminy rhythm noted that is unchanged from previous study.  Acute ST/T wave abnormalities noted.      ED Medication and Procedure Management   Prior to Admission Medications   Prescriptions Last Dose Informant Patient Reported? Taking?   Blood Glucose Monitoring Suppl (OneTouch Verio Reflect) w/Device KIT  Self No No   Sig: Check blood sugars twice daily. Please substitute with appropriate alternative as covered by patient's insurance. Dx: E11.65   DULoxetine (CYMBALTA) 30 mg delayed release capsule 12/12/2024 at  8:00 AM Self No Yes   Sig: Take 1 capsule (30 mg total) by mouth daily   Droplet Pen Needles 32G X 4 MM MISC  Self No No   Sig: USE EVERY EVENING   Empagliflozin (Jardiance) 25 MG TABS 12/12/2024 at  8:00 AM Self No Yes   Sig: TAKE 1 TABLET BY MOUTH DAILY   Multiple Vitamins-Minerals (MULTIVITAMIN MEN 50+ PO) 12/12/2024 at  8:00 AM Self Yes Yes   Sig: Take by mouth daily   Omega-3 Fatty Acids (fish oil) 1,000 mg 12/12/2024 at  8:00 AM Self Yes Yes   Sig: Take  4,000 mg by mouth 2 (two) times a day   OneTouch Delica Lancets 33G MISC  Self No No   Sig: Check blood sugars twice daily. Please substitute with appropriate alternative as covered by patient's insurance. Dx: E11.65   acetaminophen (TYLENOL) 325 mg tablet More than a month Self No No   Sig: Take 2 tablets (650 mg total) by mouth every 6 (six) hours as needed for mild pain   amLODIPine (NORVASC) 10 mg tablet 12/12/2024 at  8:00 AM Self No Yes   Sig: Take 0.5 tablets (5 mg total) by mouth daily   aspirin 81 mg chewable tablet 12/12/2024 at  8:00 AM Self Yes Yes   Sig: Chew 81 mg daily   atorvastatin (LIPITOR) 40 mg tablet 12/11/2024 at  9:00 PM Self No Yes   Sig: TAKE 1 TABLET BY MOUTH DAILY   cloNIDine (CATAPRES) 0.1 mg tablet 12/12/2024 at  8:00 AM Self No Yes   Sig: take 1 tablet by mouth every 12 hours   clopidogrel (PLAVIX) 75 mg tablet 12/12/2024 at  8:00 AM Self No Yes   Sig: Take 1 tablet (75 mg total) by mouth daily   fenofibrate 160 MG tablet 12/12/2024 at  8:00 AM Self No Yes   Sig: TAKE 1 TABLET BY MOUTH DAILY   glucose blood (OneTouch Verio) test strip  Self No No   Sig: TEST TWICE A DAY   insulin glargine (LANTUS) 100 units/mL subcutaneous injection 12/11/2024 at  9:00 PM Self No Yes   Sig: Inject 15 Units under the skin daily at bedtime   isosorbide mononitrate (IMDUR) 30 mg 24 hr tablet 12/12/2024 at  8:00 AM Self No Yes   Sig: TAKE 3 TABLETS BY MOUTH DAILY   ketoconazole (NIZORAL) 2 % cream  Self No No   Sig: Apply topically daily   metFORMIN (GLUCOPHAGE) 500 mg tablet 12/12/2024 at  8:00 AM Self No Yes   Sig: TAKE 1 TABLET BY MOUTH TWICE  DAILY WITH MEALS   metoprolol tartrate (LOPRESSOR) 25 mg tablet 12/12/2024 at  8:00 AM Self No Yes   Sig: Take 0.5 tablets (12.5 mg total) by mouth every 12 (twelve) hours   nitroglycerin (NITROSTAT) 0.4 mg SL tablet Unknown Self No No   Sig: Place 1 tablet (0.4 mg total) under the tongue every 5 (five) minutes as needed for chest pain   ondansetron (ZOFRAN) 8 mg  tablet Not Taking Self No No   Sig: Take 0.5 tablets (4 mg total) by mouth every 6 (six) hours as needed for nausea or vomiting   Patient not taking: Reported on 12/12/2024   pantoprazole (PROTONIX) 40 mg tablet 12/12/2024 at  8:00 AM Self No Yes   Sig: TAKE 1 TABLET BY MOUTH DAILY   pentoxifylline (TRENtal) 400 mg ER tablet 12/12/2024 at  8:00 AM Self No Yes   Sig: TAKE 1 TABLET BY MOUTH 3 TIMES  DAILY WITH MEALS   pregabalin (LYRICA) 150 mg capsule 12/12/2024 at  8:00 AM Self No Yes   Sig: Take 1 capsule (150 mg total) by mouth 3 (three) times a day   ranolazine (RANEXA) 1000 MG SR tablet 12/12/2024 at  8:00 AM Self No Yes   Sig: Take 1 tablet (1,000 mg total) by mouth 2 (two) times a day   sodium hypochlorite (DAKIN'S HALF-STRENGTH) external solution 12/12/2024 at  8:00 AM Self No Yes   Sig: Apply 1 Application topically every other day At each dressing change   torsemide (DEMADEX) 20 mg tablet 12/11/2024 at 12:00 PM Self No Yes   Sig: TAKE 1 TABLET BY MOUTH DAILY      Facility-Administered Medications: None     Patient's Medications   Discharge Prescriptions    No medications on file     No discharge procedures on file.  ED SEPSIS DOCUMENTATION   Time reflects when diagnosis was documented in both MDM as applicable and the Disposition within this note       Time User Action Codes Description Comment    12/12/2024 11:39 AM Cindi Spears Add [R00.1] Bradycardia     12/12/2024 12:44 PM Cindi Spears Add [R00.1] Symptomatic bradycardia     12/12/2024 12:44 PM Cindi Spears Add [I49.8] Bigeminy     12/12/2024 12:44 PM Cindi Spears Add [I49.3] PVC (premature ventricular contraction)                  Cindi Spears DO  12/12/24 1340

## 2024-12-12 NOTE — ASSESSMENT & PLAN NOTE
9/7/2024 TTE: aortic valve is try leaflet moderately thickened with moderate calcification and moderately reduced mobility. There is mild regurgitation and moderate to severe stenosis with peak gradient of 60 mm Mercury and mean gradient of 32 mmHg  will repeat echocardiogram to reevaluate

## 2024-12-12 NOTE — ASSESSMENT & PLAN NOTE
Lab Results   Component Value Date    EGFR 45 12/12/2024    EGFR 42 10/14/2024    EGFR 53 09/07/2024    CREATININE 1.42 (H) 12/12/2024    CREATININE 1.51 (H) 10/14/2024    CREATININE 1.25 09/07/2024

## 2024-12-12 NOTE — ASSESSMENT & PLAN NOTE
Lab Results   Component Value Date    HGBA1C 6.2 (H) 10/14/2024       Recent Labs     12/11/24  0811 12/11/24  1036 12/12/24  0808 12/12/24  1020   POCGLU 227* 267* 169* 185*       Blood Sugar Average: Last 72 hrs:  managed per primary team

## 2024-12-12 NOTE — CONSULTS
Consultation - Cardiology   Name: Thomas Horne 79 y.o. male I MRN: [id number]  Unit/Bed#: 4 [address] I Date of Admission: 12/12/2024   Date of Service: 12/12/2024 I Hospital Day: 0   Consult to cardiology  Consult performed by: APOLLO Antonio  Consult ordered by: Cindi Spears DO        Physician Requesting Evaluation: Kristina Davalos MD   Reason for Evaluation / Principal Problem: concern for pseudo-bradycardia due to frequent PVCs    Assessment & Plan  Bradycardia  patient sent from [facility] due to bradycardia with pulse ox heart rate of 20 to 30  patient noted to have frequent PVCs in bigemy, and most likely pulse ox is not detecting the PVCs as they are not perfused in the periphery.  Will hold patient's clonidine and beta-blocker at this time and continue to monitor  continue monitor telemetry  will need monitor in the outpatient setting for PVC burden  Coronary artery disease involving native coronary artery of native heart without angina pectoris  history of CABG times three in 2002 and PCI with drug-eluting stent to distal left circ in 2021  10/19/2022 [facility]: triple vessel disease with 100% occluded right coronary artery, 100% occluded mid LAD. SVG to RCA was also hundred percent occluded but RCA had collaterals from left circulation. Lima to LAD was widely patent and no flow was noted in the SVG to the circumflex  continue aspirin 81 mg once a day  continue Ranexa 1000 mg BID  Continue Imdur 30 mg daily  continue Norvasc 5 mg daily  Primary hypertension  blood pressures currently stable and acceptable  Mixed hyperlipidemia  continue Lipitor 40 mg daily  Nonrheumatic aortic valve stenosis  9/7/2024 TTE: aortic valve is try leaflet moderately thickened with moderate calcification and moderately reduced mobility. There is mild regurgitation and moderate to severe stenosis with peak gradient of 60 mm Mercury and mean gradient of 32 mmHg  will repeat echocardiogram to reevaluate  Type 2 diabetes  mellitus with diabetic polyneuropathy, with long-term current use of insulin ([facility])  Lab Results   Component Value Date    HGBA1C 6.2 (H) 10/14/2024       Recent Labs     12/11/24  0811 12/11/24  1036 12/12/24  0808 12/12/24  1020   POCGLU 227* 267* 169* 185*       Blood Sugar Average: Last 72 hrs:  managed per primary team    Peripheral artery disease ([facility])  follows with vascular surgery,  history of bypass grafting to lower extremities  history of EVAR due to AAA  Chronic kidney disease-mineral and bone disorder  Lab Results   Component Value Date    EGFR 45 12/12/2024    EGFR 42 10/14/2024    EGFR 53 09/07/2024    CREATININE 1.42 (H) 12/12/2024    CREATININE 1.51 (H) 10/14/2024    CREATININE 1.25 09/07/2024     Chronic diastolic HF (heart failure) ([facility])  Wt Readings from Last 3 Encounters:   12/12/24 108 kg (238 lb 1.6 oz)   12/11/24 107 kg (236 lb)   12/11/24 107 kg (236 lb)   appears to be euvolemic  during hospitalization  Continue Jardiance, he is currently on 25 mg for diabetes  continue torsemide 20 mg daily  will hold beta-blocker at this time and continue to monitor  low sodium diet  CHF education  monitor I and O, daily weights and labs          Stage 3b chronic kidney disease ([facility])  Lab Results   Component Value Date    EGFR 45 12/12/2024    EGFR 42 10/14/2024    EGFR 53 09/07/2024    CREATININE 1.42 (H) 12/12/2024    CREATININE 1.51 (H) 10/14/2024    CREATININE 1.25 09/07/2024   continue to monitor  Frequent PVCs  continue to monitor telemetry  Diabetic ulcer of toe of left foot associated with type 2 diabetes mellitus, limited to breakdown of skin ([facility])  Lab Results   Component Value Date    HGBA1C 6.2 (H) 10/14/2024       Recent Labs     12/11/24  0811 12/11/24  1036 12/12/24  0808 12/12/24  1020   POCGLU 227* 267* 169* 185*       Blood Sugar Average: Last 72 hrs:  managed  per primary team  patient follows with wound center for his diabetic ulcer and is undergoing hyperbaric oxygen treatments which  seem to be successful        History of Present Illness   Thomas Horne is a 79 y.o. male who was sent to the emergency room from the wound center after they noted a low heart rate when he was completing his hyperbaric oxygen treatment. Wound center said his heart rate was as low as 30. 12 lead EKG in the emergency room notes sinus rhythm with PACs and PVCs in Bigemy. Patient denies any palpitations, but states at times he will have intermittent lightheadedness and dizziness. He was seen in our office in early December for nurse visit and 12 lead EKG also noted frequent PVCs. At that time his Lopressor was decreased to 12.5 mg twice a day. Patient is being admitted at this time for further evaluation.    Patient has a history for coronary artery disease, chronic kidney disease, AAA for which he had a EVAR, previous MI for which he is had coronary artery bypass grafting times three in 2020. Patient has significant history of peripheral arterial disease with bypass, diabetes with neuropathy, hypertension, dyslipidemia, sleep apnea for which he did not tolerate CPAP.    Labs done in the emergency room, creatinine was mildly elevated at 1.42 high-sensitivity troponins are negative times two, BNP was 1067. 2D echocardiogram performed 9/10/2024 notes EF of 50% with grade 2 diastolic dysfunction, left atrium was moderately dilated aortic valve had a peak gradient of 60 mm Mercury and mean gradient of 32 mmHg, mitral valve had mild regurgitation as did the tricuspid valve.    Review of Systems   Constitutional:  Positive for activity change and fatigue.   HENT: Negative.  Negative for congestion, ear discharge, rhinorrhea and tinnitus.    Eyes: Negative.  Negative for photophobia and visual disturbance.   Respiratory: Negative.  Negative for chest tightness and shortness of breath.    Cardiovascular: Negative.  Negative for chest pain, palpitations and leg swelling.   Gastrointestinal: Negative.  Negative for abdominal  distention, constipation, nausea and vomiting.   Endocrine: Negative.  Negative for polydipsia, polyphagia and polyuria.   Genitourinary: Negative.  Negative for difficulty urinating.   Musculoskeletal: Negative.    Skin: Negative.    Neurological:  Positive for dizziness and light-headedness. Negative for syncope and weakness.   Hematological: Negative.    Psychiatric/Behavioral: Negative.       I have reviewed the patient's PMH, PSH, Social History, Family History, Meds, and Allergies  Historical Information   Past Medical History:   Diagnosis Date    Anemia     CAD (coronary artery disease)     Callus     Cancer (HCC)     prostate    CHF (congestive heart failure) (HCC)     Chronic kidney disease     Clotting disorder (HCC)     Coronary artery disease 1988    Deep vein thrombosis (HCC)     Diabetes mellitus (HCC)     Difficulty walking     Duodenal ulcer     Ear problems     Heart disease 1988    HL (hearing loss)     Hyperlipidemia     Hypertension     Myocardial infarction (HCC)     Neuropathy     Bilateral feet    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)     Plantar fasciitis     Sleep apnea     Could not tolerate CPAP     Past Surgical History:   Procedure Laterality Date    ABDOMINAL AORTIC ANEURYSM REPAIR      Stented    ADENOIDECTOMY      BACK SURGERY  1985    CARDIAC CATHETERIZATION Left 10/19/2022    Procedure: Cardiac Left Heart Cath;  Surgeon: Danielle Pereira MD;  Location: [facility];  Service: Cardiology    CARDIAC SURGERY  2002    3 cardiac bypass then angioplasty 7/2020    CHOLECYSTECTOMY      COLONOSCOPY  02/14/2024    CORONARY ARTERY BYPASS GRAFT      IR LOWER EXTREMITY ANGIOGRAM  11/01/2023    IR LOWER EXTREMITY ANGIOGRAM  08/07/2024    LAMINECTOMY  1990    SC BYPASS W/VEIN FEMORAL-POPLITEAL Right 11/16/2023    Procedure: BYPASS FEMORAL-POPLITEAL WITH CRYO VEIN, RIGHT FEMORAL ENDARTERECTOMY;  Surgeon: Vasquez Clark MD;  Location: [facility];  Service: Vascular    SC BYPASS W/VEIN  FEMORAL-POPLITEAL Left 2024    Procedure: left lower extremity above knee popliteal to below knee popliteal artery bypass with PTFE graft;  Surgeon: Vasquez Clark MD;  Location: [facility];  Service: Vascular    WV SLCTV CATHJ 3RD+ ORD SLCTV ABDL PEL/LXTR BRNCH Right 2023    Procedure: ARTERIOGRAM Right lower extremity arteriogram with CO2 via right groin access;  Surgeon: Vasquez Clark MD;  Location: [facility];  Service: Vascular    WV SLCTV CATHJ 3RD+ ORD SLCTV ABDL PEL/LXTR BRNCH Left 2024    Procedure: diagnostic LLE Arteriogram;  Surgeon: Vasquez Clark MD;  Location: [facility];  Service: Vascular    PROSTATE SURGERY      TONSILLECTOMY      URINARY SPHINCTER IMPLANT       Social History     Tobacco Use    Smoking status: Former     Current packs/day: 0.00     Average packs/day: 1.5 packs/day for 33.0 years (49.5 ttl pk-yrs)     Types: Cigarettes     Start date: 1956     Quit date:      Years since quittin.9     Passive exposure: Past    Smokeless tobacco: Never   Vaping Use    Vaping status: Never Used   Substance and Sexual Activity    Alcohol use: Yes     Alcohol/week: 5.0 standard drinks of alcohol     Types: 5 Cans of beer per week     Comment: stopped last few months    Drug use: Never    Sexual activity: Not Currently     Partners: Female     Birth control/protection: Male Sterilization     E-Cigarette/Vaping    E-Cigarette Use Never User      E-Cigarette/Vaping Substances    Nicotine No     THC No     CBD No     Flavoring No     Other No     Unknown No      Family History   Problem Relation Age of Onset    Diabetes Mother     Alcohol abuse Father     Heart disease Brother     Cancer Sister         Thyroid    Cancer Sister         Colon    Cancer Brother         Throat    Thyroid disease Brother     Cancer Brother     Diabetes Sister     Thyroid disease Sister     Mental illness Neg Hx      Social History     Tobacco Use    Smoking  status: Former     Current packs/day: 0.00     Average packs/day: 1.5 packs/day for 33.0 years (49.5 ttl pk-yrs)     Types: Cigarettes     Start date: 1956     Quit date:      Years since quittin.9     Passive exposure: Past    Smokeless tobacco: Never   Vaping Use    Vaping status: Never Used   Substance and Sexual Activity    Alcohol use: Yes     Alcohol/week: 5.0 standard drinks of alcohol     Types: 5 Cans of beer per week     Comment: stopped last few months    Drug use: Never    Sexual activity: Not Currently     Partners: Female     Birth control/protection: Male Sterilization       Current Facility-Administered Medications:     acetaminophen (TYLENOL) tablet 650 mg, Q6H PRN    atropine 1 mg/10 mL injection 1 mg, Once    [START ON 2024] enoxaparin (LOVENOX) subcutaneous injection 40 mg, Daily  Prior to Admission Medications   Prescriptions Last Dose Informant Patient Reported? Taking?   Blood Glucose Monitoring Suppl (OneTouch Verio Reflect) w/Device KIT  Self No No   Sig: Check blood sugars twice daily. Please substitute with appropriate alternative as covered by patient's insurance. Dx: E11.65   DULoxetine (CYMBALTA) 30 mg delayed release capsule 2024 at  8:00 AM Self No Yes   Sig: Take 1 capsule (30 mg total) by mouth daily   Droplet Pen Needles 32G X 4 MM MISC  Self No No   Sig: USE EVERY EVENING   Empagliflozin (Jardiance) 25 MG TABS 2024 at  8:00 AM Self No Yes   Sig: TAKE 1 TABLET BY MOUTH DAILY   Multiple Vitamins-Minerals (MULTIVITAMIN MEN 50+ PO) 2024 at  8:00 AM Self Yes Yes   Sig: Take by mouth daily   Omega-3 Fatty Acids (fish oil) 1,000 mg 2024 at  8:00 AM Self Yes Yes   Sig: Take 4,000 mg by mouth 2 (two) times a day   OneTouch Delica Lancets 33G MISC  Self No No   Sig: Check blood sugars twice daily. Please substitute with appropriate alternative as covered by patient's insurance. Dx: E11.65   acetaminophen (TYLENOL) 325 mg tablet More than a month  Self No No   Sig: Take 2 tablets (650 mg total) by mouth every 6 (six) hours as needed for mild pain   amLODIPine (NORVASC) 10 mg tablet 12/12/2024 at  8:00 AM Self No Yes   Sig: Take 0.5 tablets (5 mg total) by mouth daily   aspirin 81 mg chewable tablet 12/12/2024 at  8:00 AM Self Yes Yes   Sig: Chew 81 mg daily   atorvastatin (LIPITOR) 40 mg tablet 12/11/2024 at  9:00 PM Self No Yes   Sig: TAKE 1 TABLET BY MOUTH DAILY   cloNIDine (CATAPRES) 0.1 mg tablet 12/12/2024 at  8:00 AM Self No Yes   Sig: take 1 tablet by mouth every 12 hours   clopidogrel (PLAVIX) 75 mg tablet 12/12/2024 at  8:00 AM Self No Yes   Sig: Take 1 tablet (75 mg total) by mouth daily   fenofibrate 160 MG tablet 12/12/2024 at  8:00 AM Self No Yes   Sig: TAKE 1 TABLET BY MOUTH DAILY   glucose blood (OneTouch Verio) test strip  Self No No   Sig: TEST TWICE A DAY   insulin glargine (LANTUS) 100 units/mL subcutaneous injection 12/11/2024 at  9:00 PM Self No Yes   Sig: Inject 15 Units under the skin daily at bedtime   isosorbide mononitrate (IMDUR) 30 mg 24 hr tablet 12/12/2024 at  8:00 AM Self No Yes   Sig: TAKE 3 TABLETS BY MOUTH DAILY   ketoconazole (NIZORAL) 2 % cream  Self No No   Sig: Apply topically daily   metFORMIN (GLUCOPHAGE) 500 mg tablet 12/12/2024 at  8:00 AM Self No Yes   Sig: TAKE 1 TABLET BY MOUTH TWICE  DAILY WITH MEALS   metoprolol tartrate (LOPRESSOR) 25 mg tablet 12/12/2024 at  8:00 AM Self No Yes   Sig: Take 0.5 tablets (12.5 mg total) by mouth every 12 (twelve) hours   nitroglycerin (NITROSTAT) 0.4 mg SL tablet Unknown Self No No   Sig: Place 1 tablet (0.4 mg total) under the tongue every 5 (five) minutes as needed for chest pain   ondansetron (ZOFRAN) 8 mg tablet Not Taking Self No No   Sig: Take 0.5 tablets (4 mg total) by mouth every 6 (six) hours as needed for nausea or vomiting   Patient not taking: Reported on 12/12/2024   pantoprazole (PROTONIX) 40 mg tablet 12/12/2024 at  8:00 AM Self No Yes   Sig: TAKE 1 TABLET BY MOUTH  DAILY   pentoxifylline (TRENtal) 400 mg ER tablet 12/12/2024 at  8:00 AM Self No Yes   Sig: TAKE 1 TABLET BY MOUTH 3 TIMES  DAILY WITH MEALS   pregabalin (LYRICA) 150 mg capsule 12/12/2024 at  8:00 AM Self No Yes   Sig: Take 1 capsule (150 mg total) by mouth 3 (three) times a day   ranolazine (RANEXA) 1000 MG SR tablet 12/12/2024 at  8:00 AM Self No Yes   Sig: Take 1 tablet (1,000 mg total) by mouth 2 (two) times a day   sodium hypochlorite (DAKIN'S HALF-STRENGTH) external solution 12/12/2024 at  8:00 AM Self No Yes   Sig: Apply 1 Application topically every other day At each dressing change   torsemide (DEMADEX) 20 mg tablet 12/11/2024 at 12:00 PM Self No Yes   Sig: TAKE 1 TABLET BY MOUTH DAILY      Facility-Administered Medications: None     Lisinopril    Objective :  Temp:  [97.1 °F (36.2 °C)-97.2 °F (36.2 °C)] 97.2 °F (36.2 °C)  HR:  [27-73] 54  BP: (0-163)/(0-84) 130/56  Resp:  [16-21] 20  SpO2:  [95 %-100 %] 96 %  O2 Device: None (Room air)  Orthostatic Blood Pressures      Flowsheet Row Most Recent Value   Blood Pressure 130/56 filed at 12/12/2024 1531   Patient Position - Orthostatic VS Lying filed at 12/12/2024 1135          First Weight: Weight - Scale: 108 kg (238 lb 1.6 oz) (12/12/24 1139)  Vitals:    12/12/24 1139   Weight: 108 kg (238 lb 1.6 oz)       Physical Exam  Vitals and nursing note reviewed.   Constitutional:       General: He is not in acute distress.     Appearance: Normal appearance. He is obese.   HENT:      Right Ear: External ear normal.      Left Ear: External ear normal.   Eyes:      General: No scleral icterus.        Right eye: No discharge.         Left eye: No discharge.   Abdominal:      General: Bowel sounds are normal. There is no distension.      Palpations: Abdomen is soft.   Musculoskeletal:      Right lower leg: Edema present.      Left lower leg: Edema present.      Comments: Trace lower extremity edema   Skin:     General: Skin is warm and dry.      Capillary Refill:  "Capillary refill takes less than 2 seconds.   Neurological:      General: No focal deficit present.      Mental Status: He is alert and oriented to person, place, and time. Mental status is at baseline.   Psychiatric:         Mood and Affect: Mood normal.           Lab Results: I have reviewed the following results:  Results from last 7 days   Lab Units 12/12/24  1142   WBC Thousand/uL 10.89*   HEMOGLOBIN g/dL 12.9   HEMATOCRIT % 41.3   PLATELETS Thousands/uL 199     Results from last 7 days   Lab Units 12/12/24  1142   POTASSIUM mmol/L 5.0   CHLORIDE mmol/L 104   CO2 mmol/L 29   BUN mg/dL 30*   CREATININE mg/dL 1.42*   CALCIUM mg/dL 9.3     Results from last 7 days   Lab Units 12/12/24  1142   INR  1.08   PTT seconds 35*     Lab Results   Component Value Date    HGBA1C 6.2 (H) 10/14/2024     No results found for: \"CKTOTAL\", \"CKMB\", \"CKMBINDEX\", \"TROPONINI\"    12 lead EKG demonstrates sinus rhythm with PVCs in bigemy    VTE Prophylaxis: VTE covered by:  [START ON 12/13/2024] enoxaparin, Subcutaneous    and Sequential compression device (Venodyne)     Alisia BUSTILLOS  Cardiology  "

## 2024-12-12 NOTE — ASSESSMENT & PLAN NOTE
Wt Readings from Last 3 Encounters:   12/12/24 108 kg (238 lb 1.6 oz)   12/11/24 107 kg (236 lb)   12/11/24 107 kg (236 lb)     Echocardiogram 9/24-EF 50%, grade 2 diastolic dysfunction, moderate to severe aortic stenosis,  Patient reports symptoms of shortness of breath at times and with activity.  proBNP 1067  Patient denies any worsening of leg edema or weight gain.  Appears compensated  Check chest x-ray  Check 2D echo  Holding metoprolol currently secondary to bradycardia  Continue Jardiance, torsemide  Monitor intake output, daily weight  Fluid and salt restriction  Follow-up cardiology recommendations

## 2024-12-12 NOTE — ASSESSMENT & PLAN NOTE
history of CABG times three in 2002 and PCI with drug-eluting stent to distal left circ in 2021  10/19/2022 [facility]: triple vessel disease with 100% occluded right coronary artery, 100% occluded mid LAD. SVG to RCA was also hundred percent occluded but RCA had collaterals from left circulation. Lima to LAD was widely patent and no flow was noted in the SVG to the circumflex  continue aspirin 81 mg once a day  continue Ranexa 1000 mg BID  Continue Imdur 30 mg daily  continue Norvasc 5 mg daily

## 2024-12-12 NOTE — ASSESSMENT & PLAN NOTE
Wt Readings from Last 3 Encounters:   12/12/24 108 kg (238 lb 1.6 oz)   12/11/24 107 kg (236 lb)   12/11/24 107 kg (236 lb)   appears to be euvolemic  during hospitalization  Continue Jardiance, he is currently on 25 mg for diabetes  continue torsemide 20 mg daily  will hold beta-blocker at this time and continue to monitor  low sodium diet  CHF education  monitor I and O, daily weights and labs

## 2024-12-12 NOTE — ED NOTES
EKG done given to Dr. Spears, patient taken to CT room. He is alert and talkative, color pink, skin warm and dry and he is making jokes. States he drove himself to wound care.        Luisa Tolbert RN  12/12/24 [id number]

## 2024-12-12 NOTE — ASSESSMENT & PLAN NOTE
Lab Results   Component Value Date    HGBA1C 6.2 (H) 10/14/2024       Recent Labs     12/11/24  0811 12/11/24  1036 12/12/24  0808 12/12/24  1020   POCGLU 227* 267* 169* 185*       Blood Sugar Average: Last 72 hrs:

## 2024-12-12 NOTE — ASSESSMENT & PLAN NOTE
follows with vascular surgery,  history of bypass grafting to lower extremities  history of EVAR due to AAA

## 2024-12-12 NOTE — ASSESSMENT & PLAN NOTE
Lab Results   Component Value Date    HGBA1C 6.2 (H) 10/14/2024       Recent Labs     12/11/24  0811 12/11/24  1036 12/12/24  0808 12/12/24  1020   POCGLU 227* 267* 169* 185*       Blood Sugar Average: Last 72 hrs:    Home regimen, Jardiance, Januvia, Lantus 15 units nightly  Continue Jardiance, Januvia  Continue glargine 15 units nightly  Insulin sliding scale  Diabetic diet  Monitor blood glucose trend

## 2024-12-12 NOTE — Clinical Note
The PACER GENERATOR JAVIER XT DR MARIA LUZ ORTEZ - [id number] device was inserted. The leads were placed into the connector and visually verified to be in correct position. Injury current obtained.

## 2024-12-12 NOTE — ASSESSMENT & PLAN NOTE
Lab Results   Component Value Date    HGBA1C 6.2 (H) 10/14/2024       Recent Labs     12/11/24  0811 12/11/24  1036 12/12/24  0808 12/12/24  1020   POCGLU 227* 267* 169* 185*       Blood Sugar Average: Last 72 hrs:  managed  per primary team  patient follows with wound center for his diabetic ulcer and is undergoing hyperbaric oxygen treatments which seem to be successful

## 2024-12-13 ENCOUNTER — APPOINTMENT (OUTPATIENT)
Dept: NON INVASIVE DIAGNOSTICS | Facility: HOSPITAL | Age: 79
DRG: 242 | End: 2024-12-13
Payer: COMMERCIAL

## 2024-12-13 ENCOUNTER — APPOINTMENT (OUTPATIENT)
Dept: RADIOLOGY | Facility: HOSPITAL | Age: 79
DRG: 242 | End: 2024-12-13
Payer: COMMERCIAL

## 2024-12-13 LAB
ANION GAP SERPL CALCULATED.3IONS-SCNC: 4 MMOL/L (ref 4–13)
AORTIC ROOT: 3.6 CM
AORTIC VALVE MEAN VELOCITY: 25.7 M/S
APICAL FOUR CHAMBER EJECTION FRACTION: 32 %
ATRIAL RATE: 78 BPM
AV AREA BY CONTINUOUS VTI: 1 CM2
AV AREA PEAK VELOCITY: 1 CM2
AV LVOT MEAN GRADIENT: 2 MMHG
AV LVOT PEAK GRADIENT: 3 MMHG
AV MEAN GRADIENT: 29 MMHG
AV PEAK GRADIENT: 48 MMHG
AV VALVE AREA: 1.05 CM2
AV VELOCITY RATIO: 0.26
BSA FOR ECHO PROCEDURE: 2.3 M2
BUN SERPL-MCNC: 28 MG/DL (ref 5–25)
CALCIUM SERPL-MCNC: 8.8 MG/DL (ref 8.4–10.2)
CHLORIDE SERPL-SCNC: 105 MMOL/L (ref 96–108)
CO2 SERPL-SCNC: 29 MMOL/L (ref 21–32)
CREAT SERPL-MCNC: 1.47 MG/DL (ref 0.6–1.3)
DOP CALC AO PEAK VEL: 3.46 M/S
DOP CALC AO VTI: 78.36 CM
DOP CALC LVOT AREA: 3.8 CM2
DOP CALC LVOT CARDIAC INDEX: 3.75 L/MIN/M2
DOP CALC LVOT CARDIAC OUTPUT: 8.63 L/MIN
DOP CALC LVOT DIAMETER: 2.2 CM
DOP CALC LVOT PEAK VEL VTI: 21.62 CM
DOP CALC LVOT PEAK VEL: 0.9 M/S
DOP CALC LVOT STROKE INDEX: 36.5 ML/M2
DOP CALC LVOT STROKE VOLUME: 82.14
E WAVE DECELERATION TIME: 233 MS
E/A RATIO: 1.3
ERYTHROCYTE [DISTWIDTH] IN BLOOD BY AUTOMATED COUNT: 14.4 % (ref 11.6–15.1)
FRACTIONAL SHORTENING: 23 (ref 28–44)
GFR SERPL CREATININE-BSD FRML MDRD: 44 ML/MIN/1.73SQ M
GLUCOSE P FAST SERPL-MCNC: 122 MG/DL (ref 65–99)
GLUCOSE SERPL-MCNC: 122 MG/DL (ref 65–140)
GLUCOSE SERPL-MCNC: 124 MG/DL (ref 65–140)
GLUCOSE SERPL-MCNC: 145 MG/DL (ref 65–140)
GLUCOSE SERPL-MCNC: 155 MG/DL (ref 65–140)
GLUCOSE SERPL-MCNC: 178 MG/DL (ref 65–140)
HCT VFR BLD AUTO: 38.1 % (ref 36.5–49.3)
HGB BLD-MCNC: 11.9 G/DL (ref 12–17)
INTERVENTRICULAR SEPTUM IN DIASTOLE (PARASTERNAL SHORT AXIS VIEW): 1.1 CM
INTERVENTRICULAR SEPTUM: 1.1 CM (ref 0.6–1.1)
LAAS-AP2: 31.4 CM2
LAAS-AP4: 26.8 CM2
LEFT ATRIUM SIZE: 5.5 CM
LEFT ATRIUM VOLUME (MOD BIPLANE): 106 ML
LEFT ATRIUM VOLUME INDEX (MOD BIPLANE): 46.1 ML/M2
LEFT INTERNAL DIMENSION IN SYSTOLE: 4.4 CM (ref 2.1–4)
LEFT VENTRICLE DIASTOLIC VOLUME (MOD BIPLANE): 171 ML
LEFT VENTRICLE DIASTOLIC VOLUME INDEX (MOD BIPLANE): 74.3 ML/M2
LEFT VENTRICLE SYSTOLIC VOLUME (MOD BIPLANE): 85 ML
LEFT VENTRICLE SYSTOLIC VOLUME INDEX (MOD BIPLANE): 37 ML/M2
LEFT VENTRICULAR INTERNAL DIMENSION IN DIASTOLE: 5.7 CM (ref 3.5–6)
LEFT VENTRICULAR POSTERIOR WALL IN END DIASTOLE: 1.1 CM
LEFT VENTRICULAR STROKE VOLUME: 72 ML
LV EF: 50 %
LVSV (TEICH): 72 ML
MAGNESIUM SERPL-MCNC: 2.1 MG/DL (ref 1.9–2.7)
MCH RBC QN AUTO: 27 PG (ref 26.8–34.3)
MCHC RBC AUTO-ENTMCNC: 31.2 G/DL (ref 31.4–37.4)
MCV RBC AUTO: 86 FL (ref 82–98)
MV E'TISSUE VEL-LAT: 5 CM/S
MV E'TISSUE VEL-SEP: 6 CM/S
MV PEAK A VEL: 0.84 M/S
MV PEAK E VEL: 109 CM/S
MV STENOSIS PRESSURE HALF TIME: 68 MS
MV VALVE AREA P 1/2 METHOD: 3.24
P AXIS: 8 DEGREES
PLATELET # BLD AUTO: 166 THOUSANDS/UL (ref 149–390)
PMV BLD AUTO: 11.5 FL (ref 8.9–12.7)
POTASSIUM SERPL-SCNC: 3.9 MMOL/L (ref 3.5–5.3)
PR INTERVAL: 264 MS
QRS AXIS: -59 DEGREES
QRSD INTERVAL: 120 MS
QT INTERVAL: 530 MS
QTC INTERVAL: 612 MS
RBC # BLD AUTO: 4.41 MILLION/UL (ref 3.88–5.62)
RIGHT ATRIUM AREA SYSTOLE A4C: 20.5 CM2
RIGHT VENTRICLE ID DIMENSION: 4.7 CM
SL CV LEFT ATRIUM LENGTH A2C: 7 CM
SL CV LV EF: 50
SL CV PED ECHO LEFT VENTRICLE DIASTOLIC VOLUME (MOD BIPLANE) 2D: 161 ML
SL CV PED ECHO LEFT VENTRICLE SYSTOLIC VOLUME (MOD BIPLANE) 2D: 89 ML
SODIUM SERPL-SCNC: 138 MMOL/L (ref 135–147)
T WAVE AXIS: 109 DEGREES
TR MAX PG: 39 MMHG
TR PEAK VELOCITY: 3.1 M/S
TRICUSPID VALVE PEAK REGURGITATION VELOCITY: 3.14 M/S
VENTRICULAR RATE: 80 BPM
WBC # BLD AUTO: 10.87 THOUSAND/UL (ref 4.31–10.16)

## 2024-12-13 PROCEDURE — 93308 TTE F-UP OR LMTD: CPT | Performed by: INTERNAL MEDICINE

## 2024-12-13 PROCEDURE — 85027 COMPLETE CBC AUTOMATED: CPT | Performed by: INTERNAL MEDICINE

## 2024-12-13 PROCEDURE — 82948 REAGENT STRIP/BLOOD GLUCOSE: CPT

## 2024-12-13 PROCEDURE — 93325 DOPPLER ECHO COLOR FLOW MAPG: CPT

## 2024-12-13 PROCEDURE — 93321 DOPPLER ECHO F-UP/LMTD STD: CPT

## 2024-12-13 PROCEDURE — 93970 EXTREMITY STUDY: CPT

## 2024-12-13 PROCEDURE — 99232 SBSQ HOSP IP/OBS MODERATE 35: CPT | Performed by: INTERNAL MEDICINE

## 2024-12-13 PROCEDURE — 93325 DOPPLER ECHO COLOR FLOW MAPG: CPT | Performed by: INTERNAL MEDICINE

## 2024-12-13 PROCEDURE — 93308 TTE F-UP OR LMTD: CPT

## 2024-12-13 PROCEDURE — 93010 ELECTROCARDIOGRAM REPORT: CPT | Performed by: INTERNAL MEDICINE

## 2024-12-13 PROCEDURE — 80048 BASIC METABOLIC PNL TOTAL CA: CPT | Performed by: INTERNAL MEDICINE

## 2024-12-13 PROCEDURE — 93321 DOPPLER ECHO F-UP/LMTD STD: CPT | Performed by: INTERNAL MEDICINE

## 2024-12-13 PROCEDURE — 83735 ASSAY OF MAGNESIUM: CPT | Performed by: INTERNAL MEDICINE

## 2024-12-13 PROCEDURE — 93970 EXTREMITY STUDY: CPT | Performed by: SURGERY

## 2024-12-13 RX ORDER — FUROSEMIDE 10 MG/ML
40 INJECTION INTRAMUSCULAR; INTRAVENOUS DAILY
Status: DISCONTINUED | OUTPATIENT
Start: 2024-12-13 | End: 2024-12-16

## 2024-12-13 RX ORDER — POTASSIUM CHLORIDE 750 MG/1
10 TABLET, EXTENDED RELEASE ORAL ONCE
Status: COMPLETED | OUTPATIENT
Start: 2024-12-13 | End: 2024-12-13

## 2024-12-13 RX ADMIN — INSULIN LISPRO 1 UNITS: 100 INJECTION, SOLUTION INTRAVENOUS; SUBCUTANEOUS at 17:17

## 2024-12-13 RX ADMIN — PREGABALIN 150 MG: 75 CAPSULE ORAL at 08:46

## 2024-12-13 RX ADMIN — FUROSEMIDE 40 MG: 10 INJECTION, SOLUTION INTRAMUSCULAR; INTRAVENOUS at 08:59

## 2024-12-13 RX ADMIN — RANOLAZINE 1000 MG: 500 TABLET, FILM COATED, EXTENDED RELEASE ORAL at 08:46

## 2024-12-13 RX ADMIN — FENOFIBRATE 145 MG: 145 TABLET, FILM COATED ORAL at 08:46

## 2024-12-13 RX ADMIN — ENOXAPARIN SODIUM 40 MG: 40 INJECTION SUBCUTANEOUS at 08:59

## 2024-12-13 RX ADMIN — PANTOPRAZOLE SODIUM 40 MG: 40 TABLET, DELAYED RELEASE ORAL at 08:46

## 2024-12-13 RX ADMIN — PENTOXIFYLLINE 400 MG: 400 TABLET, EXTENDED RELEASE ORAL at 08:46

## 2024-12-13 RX ADMIN — RANOLAZINE 1000 MG: 500 TABLET, FILM COATED, EXTENDED RELEASE ORAL at 17:16

## 2024-12-13 RX ADMIN — Medication 1 TABLET: at 08:46

## 2024-12-13 RX ADMIN — OMEGA-3 FATTY ACIDS CAP 1000 MG 2000 MG: 1000 CAP at 08:46

## 2024-12-13 RX ADMIN — INSULIN LISPRO 1 UNITS: 100 INJECTION, SOLUTION INTRAVENOUS; SUBCUTANEOUS at 12:13

## 2024-12-13 RX ADMIN — ASPIRIN 81 MG CHEWABLE TABLET 81 MG: 81 TABLET CHEWABLE at 08:46

## 2024-12-13 RX ADMIN — EMPAGLIFLOZIN 10 MG: 10 TABLET, FILM COATED ORAL at 08:46

## 2024-12-13 RX ADMIN — POTASSIUM CHLORIDE 10 MEQ: 750 TABLET, EXTENDED RELEASE ORAL at 06:32

## 2024-12-13 RX ADMIN — AMLODIPINE BESYLATE 5 MG: 5 TABLET ORAL at 08:46

## 2024-12-13 RX ADMIN — PENTOXIFYLLINE 400 MG: 400 TABLET, EXTENDED RELEASE ORAL at 17:16

## 2024-12-13 RX ADMIN — INSULIN GLARGINE 15 UNITS: 100 INJECTION, SOLUTION SUBCUTANEOUS at 21:25

## 2024-12-13 RX ADMIN — ISOSORBIDE MONONITRATE 90 MG: 60 TABLET, EXTENDED RELEASE ORAL at 08:46

## 2024-12-13 RX ADMIN — CLOPIDOGREL 75 MG: 75 TABLET ORAL at 08:46

## 2024-12-13 RX ADMIN — PREGABALIN 150 MG: 75 CAPSULE ORAL at 17:16

## 2024-12-13 RX ADMIN — OMEGA-3 FATTY ACIDS CAP 1000 MG 2000 MG: 1000 CAP at 17:16

## 2024-12-13 RX ADMIN — SITAGLIPTIN 25 MG: 25 TABLET, FILM COATED ORAL at 08:46

## 2024-12-13 RX ADMIN — DULOXETINE HYDROCHLORIDE 30 MG: 30 CAPSULE, DELAYED RELEASE ORAL at 08:46

## 2024-12-13 RX ADMIN — ATORVASTATIN CALCIUM 40 MG: 40 TABLET, FILM COATED ORAL at 08:46

## 2024-12-13 RX ADMIN — PENTOXIFYLLINE 400 MG: 400 TABLET, EXTENDED RELEASE ORAL at 12:13

## 2024-12-13 NOTE — ASSESSMENT & PLAN NOTE
History of acute nonocclusive DVT of left gastrocnemius and peroneal vein, 4/24  Likely provoked in setting of preceding hospitalization  Patient was treated with Coumadin  D-dimer noted to be elevated likely nonspecific  Recheck venous Doppler lower extremities  DVT prophylaxis

## 2024-12-13 NOTE — ASSESSMENT & PLAN NOTE
history of CABG times three in 2002 and PCI with drug-eluting stent to distal left circ in 2021  10/19/2022 [facility]: triple vessel disease with 100% occluded right coronary artery, 100% occluded mid LAD. SVG to RCA was also hundred percent occluded but RCA had collaterals from left circulation. Lima to LAD was widely patent and no flow was noted in the SVG to the circumflex  continue aspirin 81 mg once a day  continue Ranexa 1000 mg BID  Continue Imdur 30 mg daily  continue Norvasc 5 mg daily

## 2024-12-13 NOTE — ASSESSMENT & PLAN NOTE
Lab Results   Component Value Date    EGFR 44 12/13/2024    EGFR 45 12/12/2024    EGFR 42 10/14/2024    CREATININE 1.47 (H) 12/13/2024    CREATININE 1.42 (H) 12/12/2024    CREATININE 1.51 (H) 10/14/2024

## 2024-12-13 NOTE — ASSESSMENT & PLAN NOTE
patient sent from wound center due to bradycardia with pulse ox heart rate of 20 to 30  patient noted to have frequent PVCs in bigemy, and most likely pulse ox is not detecting the PVCs as they are not perfused in the periphery.  Will hold patient's clonidine and beta-blocker at this time and continue to monitor  continue monitor telemetry  overnight 12/12-12/13 patient continues to have frequent PVCs, but baseline heart rate did improve.  will need monitor in the outpatient setting for PVC burden

## 2024-12-13 NOTE — PROGRESS NOTES
Progress Note - Cardiology   Name: Thomas Horne 79 y.o. male I MRN: [id number]  Unit/Bed#: 4 [place] 420-01 I Date of Admission: 12/12/2024   Date of Service: 12/13/2024 I Hospital Day: 0    Assessment & Plan  Bradycardia  patient sent from [facility] center due to bradycardia with pulse ox heart rate of 20 to 30  patient noted to have frequent PVCs in bigemy, and most likely pulse ox is not detecting the PVCs as they are not perfused in the periphery.  Will hold patient's clonidine and beta-blocker at this time and continue to monitor  continue monitor telemetry  overnight 12/12-12/13 patient continues to have frequent PVCs, but baseline heart rate did improve.  will need monitor in the outpatient setting for PVC burden  Coronary artery disease involving native coronary artery of native heart without angina pectoris  history of CABG times three in 2002 and PCI with drug-eluting stent to distal left circ in 2021  10/19/2022 LHC: triple vessel disease with 100% occluded right coronary artery, 100% occluded mid LAD. SVG to RCA was also hundred percent occluded but RCA had collaterals from left circulation. [place] to LAD was widely patent and no flow was noted in the SVG to the circumflex  continue aspirin 81 mg once a day  continue Ranexa 1000 mg BID  Continue Imdur 30 mg daily  continue Norvasc 5 mg daily  Acute on chronic diastolic HF (heart failure) (HCC)  Wt Readings from Last 3 Encounters:   12/13/24 104 kg (229 lb)   12/11/24 107 kg (236 lb)   12/11/24 107 kg (236 lb)   overnight 12/12 patient complained of sudden onset of shortness of breath and wheezing, chest x-ray with hypoventilation increasing residual opacities suggestive of interstitial edema. Patient was given IV Lasix with improvement of symptoms  volume overload most likely due to frequent PVCs and bradycardia  Continue Jardiance, he is currently on 25 mg for diabetes  12/13/2024 will continue IV Lasix at this time at 40 mg daily, hold his torsemide  will  hold beta-blocker at this time and continue to monitor  low sodium diet  CHF education  monitor I and O, daily weights and labs          Primary hypertension  blood pressures currently stable and acceptable  Mixed hyperlipidemia  continue Lipitor 40 mg daily  Nonrheumatic aortic valve stenosis  9/7/2024 TTE: aortic valve is try leaflet moderately thickened with moderate calcification and moderately reduced mobility. There is mild regurgitation and moderate to severe stenosis with peak gradient of 60 mm Mercury and mean gradient of 32 mmHg  12/13/2024 TTE: pending  Type 2 diabetes mellitus with diabetic polyneuropathy, with long-term current use of insulin ([facility])  Lab Results   Component Value Date    HGBA1C 6.2 (H) 10/14/2024       Recent Labs     12/12/24  1020 12/12/24  1738 12/12/24 2131 12/13/24  0757   POCGLU 185* 196* 202* 124       Blood Sugar Average: Last 72 hrs:  (P) 174managed per primary team    Peripheral artery disease (HCC)  follows with vascular surgery,  history of bypass grafting to lower extremities  history of EVAR due to AAA  Chronic kidney disease-mineral and bone disorder  Lab Results   Component Value Date    EGFR 44 12/13/2024    EGFR 45 12/12/2024    EGFR 42 10/14/2024    CREATININE 1.47 (H) 12/13/2024    CREATININE 1.42 (H) 12/12/2024    CREATININE 1.51 (H) 10/14/2024     Stage 3b chronic kidney disease ([facility])  Lab Results   Component Value Date    EGFR 44 12/13/2024    EGFR 45 12/12/2024    EGFR 42 10/14/2024    CREATININE 1.47 (H) 12/13/2024    CREATININE 1.42 (H) 12/12/2024    CREATININE 1.51 (H) 10/14/2024   continue to monitor  Frequent PVCs  continue to monitor telemetry  Diabetic ulcer of toe of left foot associated with type 2 diabetes mellitus, limited to breakdown of skin ([facility])  Lab Results   Component Value Date    HGBA1C 6.2 (H) 10/14/2024       Recent Labs     12/12/24  1020 12/12/24  1738 12/12/24  2131 12/13/24  0757   POCGLU 185* 196* 202* 124       Blood Sugar Average: Last  72 hrs:  (P) 174managed  per primary team  patient follows with wound center for his diabetic ulcer and is undergoing hyperbaric oxygen treatments which seem to be successful      Subjective   Chief Complaint: Patient seen and examined. Overnight he had episode of sudden onset of shortness of breath with wheezing. Chest x-ray was concerning for interstitial edema. He was given Lasix 40 mg IV times one with improvement of his symptoms. Will continue IV Lasix at this time and monitor.    Telemetry overnight with improvement of heart rate, but patient still with frequent PVCs.      Objective :  Temp:  [97.1 °F (36.2 °C)-99.4 °F (37.4 °C)] 99.4 °F (37.4 °C)  HR:  [27-82] 61  BP: (0-184)/(0-94) 128/59  Resp:  [16-21] 19  SpO2:  [86 %-100 %] 94 %  O2 Device: Nasal cannula  Nasal Cannula O2 Flow Rate (L/min):  [4 L/min] 4 L/min  Orthostatic Blood Pressures      Flowsheet Row Most Recent Value   Blood Pressure 128/59 filed at 12/13/2024 0239   Patient Position - Orthostatic VS Lying filed at 12/13/2024 0239          First Weight: Weight - Scale: 108 kg (238 lb 1.6 oz) (12/12/24 1139)  Vitals:    12/12/24 1823 12/13/24 0640   Weight: 108 kg (239 lb) 104 kg (229 lb)     Physical Exam  Vitals and nursing note reviewed.   Constitutional:       General: He is not in acute distress.     Appearance: Normal appearance. He is obese.   HENT:      Right Ear: External ear normal.      Left Ear: External ear normal.   Eyes:      General:         Right eye: No discharge.         Left eye: No discharge.   Cardiovascular:      Rate and Rhythm: Normal rate. Rhythm irregular. Frequent Extrasystoles (PVCs) are present.     Heart sounds: Murmur heard.      Systolic murmur is present with a grade of 3/6.   Pulmonary:      Effort: Pulmonary effort is normal. No accessory muscle usage or respiratory distress.      Breath sounds: Examination of the right-lower field reveals rales. Examination of the left-lower field reveals rales. Rales present.  "  Abdominal:      General: Bowel sounds are normal. There is no distension.      Palpations: Abdomen is soft.   Musculoskeletal:      Right lower leg: Edema present.      Left lower leg: Edema present.      Comments: Trace pedal and ankle edema   Skin:     General: Skin is warm and dry.      Capillary Refill: Capillary refill takes 2 to 3 seconds.   Neurological:      General: No focal deficit present.      Mental Status: He is alert and oriented to person, place, and time. Mental status is at baseline.   Psychiatric:         Mood and Affect: Mood normal.           Lab Results: I have reviewed the following results:  Results from last 7 days   Lab Units 12/13/24  0447 12/12/24  1142   WBC Thousand/uL 10.87* 10.89*   HEMOGLOBIN g/dL 11.9* 12.9   HEMATOCRIT % 38.1 41.3   PLATELETS Thousands/uL 166 199     Results from last 7 days   Lab Units 12/13/24  0447 12/12/24  1142   POTASSIUM mmol/L 3.9 5.0   CHLORIDE mmol/L 105 104   CO2 mmol/L 29 29   BUN mg/dL 28* 30*   CREATININE mg/dL 1.47* 1.42*   CALCIUM mg/dL 8.8 9.3     Results from last 7 days   Lab Units 12/12/24  1142   INR  1.08   PTT seconds 35*     Lab Results   Component Value Date    HGBA1C 6.2 (H) 10/14/2024     No results found for: \"CKTOTAL\", \"CKMB\", \"CKMBINDEX\", \"TROPONINI\"      Telemetry: heart rates improved but still with high PVC burden.    VTE Pharmacologic Prophylaxis: VTE covered by:  enoxaparin, Subcutaneous     VTE Mechanical Prophylaxis: sequential compression device    Alisia BUSTILLOS  Cardiology  "

## 2024-12-13 NOTE — ASSESSMENT & PLAN NOTE
In setting of peripheral artery disease and diabetes, currently undergoing HBO2 treatment  No evidence of current or history of infection associated with wounds

## 2024-12-13 NOTE — ASSESSMENT & PLAN NOTE
Blood pressure stable  Continue amlodipine, isosorbide, torsemide  Holding metoprolol, clonidine in setting of bradycardia  Monitor blood pressure trend

## 2024-12-13 NOTE — PROGRESS NOTES
Progress Note - Hospitalist   Name: Thomas Horne 79 y.o. male I MRN: [id number]  Unit/Bed#: 4 [place] 420-01 I Date of Admission: 12/12/2024   Date of Service: 12/13/2024 I Hospital Day: 0    Assessment & Plan  Bradycardia  Referred to ER from wound care center with concerns of bradycardia  EKG with normal sinus rhythm frequent PVCs in bigeminy  History of bradycardia, metoprolol was recently decreased by cardiology as outpatient  Concern about pseudo bradycardia in setting of PVCs versus true  Telemetry with improved heart rate after holding mars blocking agents but significant PVCs  Hemodynamically stable   Telemetry  Hold metoprolol, clonidine.  Avoid mars blocking agents  Follow-up 2D echo  Cardiology evaluation-patient will likely require a pacemaker  Coronary artery disease involving native coronary artery of native heart without angina pectoris  History of CAD status post CABG in 2002 and status post PCI with NANDINI distal left circumflex 2021  Continue aspirin, isosorbide, Ranexa, statin  Acute on chronic HFpEF/Moderate pHTN (HFpEF) (HCC)  Wt Readings from Last 3 Encounters:   12/13/24 104 kg (229 lb)   12/11/24 107 kg (236 lb)   12/11/24 107 kg (236 lb)     Echocardiogram 9/24-EF 50%, grade 2 diastolic dysfunction, moderate to severe aortic stenosis,  Patient reports symptoms of shortness of breath at times and with activity.  proBNP 1067  Patient denies any worsening of leg edema or weight gain.  Noted to have increased shortness of breath and oxygen requirement, evening 12/12  Chest x-ray with evidence of interstitial edema, less likely pneumonia  Patient received IV Lasix x 1 with good response and improvement in symptoms  Follow-up 2D echo  Continue IV Lasix  Holding metoprolol currently secondary to bradycardia  Continue Jardiance, torsemide  Monitor intake output, daily weight  Fluid and salt restriction  Follow-up cardiology recommendations  Primary hypertension  Blood pressure stable  Continue  amlodipine, isosorbide, torsemide  Holding metoprolol, clonidine in setting of bradycardia  Monitor blood pressure trend  Type 2 diabetes mellitus with diabetic polyneuropathy, with long-term current use of insulin ([facility])  Lab Results   Component Value Date    HGBA1C 6.2 (H) 10/14/2024       Recent Labs     12/12/24  1020 12/12/24  1738 12/12/24  2131 12/13/24  0757   POCGLU 185* 196* 202* 124       Blood Sugar Average: Last 72 hrs:  (P) 174  Home regimen, Jardiance, Januvia, Lantus 15 units nightly  Continue Jardiance, Januvia  Continue glargine 15 units nightly  Insulin sliding scale  Diabetic diet  Monitor blood glucose trend  Stage 3b chronic kidney disease (HCC)  With baseline creatinine 1.6-1.8  Currently at baseline  Monitor  Peripheral artery disease ([facility])  Continue aspirin, clopidogrel, pentoxifylline  Multiple open wounds of lower extremity  In setting of peripheral artery disease and diabetes, currently undergoing HBO2 treatment  No evidence of current or history of infection associated with wounds  Mixed hyperlipidemia  Continue atorvastatin  S/P AAA repair    H/O prostate cancer  Status post prostatectomy  Iron deficiency anemia  History of iron deficiency anemia, suspected secondary to GI loss  Status post IV iron infusion  Now improved  History of DVT (deep vein thrombosis)  History of acute nonocclusive DVT of left gastrocnemius and peroneal vein, 4/24  Likely provoked in setting of preceding hospitalization  Patient was treated with Coumadin  D-dimer noted to be elevated likely nonspecific  Recheck venous Doppler lower extremities  DVT prophylaxis    VTE Pharmacologic Prophylaxis: VTE Score: 4 Moderate Risk (Score 3-4) - Pharmacological DVT Prophylaxis Ordered: enoxaparin (Lovenox).    Mobility:   Basic Mobility Inpatient Raw Score: 21  JH-HLM Goal: 6: Walk 10 steps or more  JH-HLM Achieved: 6: Walk 10 steps or more  JH-HLM Goal achieved. Continue to encourage appropriate mobility.    Patient  Centered Rounds: I performed bedside rounds with nursing staff today.   Discussions with Specialists or Other Care Team Provider: Cardiology    Education and Discussions with Family / Patient: Patient declined call to .     Current Length of Stay: 0 day(s)  Current Patient Status: Inpatient   Certification Statement: The patient, admitted on an observation basis, will now require > 2 midnight hospital stay due to arrhythmia, CHF  Discharge Plan: Anticipate discharge in 48-72 hrs to home.    Code Status: Level 3 - DNAR and DNI    Subjective   Feels better with breathing, able to ambulate to the bathroom without any shortness of breath  Denies any chest pain or dizziness    Episode of shortness of breath overnight noted to be saturating mid 80s on 2 L improved with 5 L supplemental oxygen.  Patient received IV Lasix with more than 3 L of urine output      Objective :  Temp:  [97.1 °F (36.2 °C)-99.4 °F (37.4 °C)] 99.4 °F (37.4 °C)  HR:  [27-82] 61  BP: (0-184)/(0-94) 128/59  Resp:  [16-21] 19  SpO2:  [86 %-100 %] 94 %  O2 Device: Nasal cannula  Nasal Cannula O2 Flow Rate (L/min):  [4 L/min] 4 L/min    Body mass index is 29.4 kg/m².     Input and Output Summary (last 24 hours):     Intake/Output Summary (Last 24 hours) at 12/13/2024 0816  Last data filed at 12/13/2024 0701  Gross per 24 hour   Intake 770 ml   Output 4175 ml   Net -3405 ml       Physical Exam  Constitutional:       General: He is not in acute distress.  HENT:      Head: Normocephalic and atraumatic.   Cardiovascular:      Rate and Rhythm: Normal rate.   Pulmonary:      Effort: Pulmonary effort is normal. No respiratory distress.      Breath sounds: Decreased breath sounds present. No rhonchi.   Abdominal:      General: Bowel sounds are normal. There is no distension.      Palpations: Abdomen is soft.      Tenderness: There is no abdominal tenderness. There is no guarding or rebound.   Musculoskeletal:      Right lower leg: No edema.       Left lower leg: No edema.      Comments: Lower extremity dressing in place   Skin:     General: Skin is warm and dry.      Findings: No rash.   Neurological:      Mental Status: He is alert. Mental status is at baseline.      Cranial Nerves: No cranial nerve deficit.         Lines/Drains:        Telemetry:  Telemetry Orders (From admission, onward)               24 Hour Telemetry Monitoring  Continuous x 24 Hours (Telem)        Question:  Reason for 24 Hour Telemetry  Answer:  Arrhythmias requiring acute medical intervention / PPM or ICD malfunction                     Telemetry Reviewed: Normal Sinus Rhythm with frequent PVCs/bigeminy  Indication for Continued Telemetry Use: Arrthymias requiring medical therapy               Lab Results: I have reviewed the following results:   Results from last 7 days   Lab Units 12/13/24  0447 12/12/24  1142   WBC Thousand/uL 10.87* 10.89*   HEMOGLOBIN g/dL 11.9* 12.9   HEMATOCRIT % 38.1 41.3   PLATELETS Thousands/uL 166 199   SEGS PCT %  --  79*   LYMPHO PCT %  --  13*   MONO PCT %  --  6   EOS PCT %  --  2     Results from last 7 days   Lab Units 12/13/24  0447 12/12/24  1142   SODIUM mmol/L 138 137   POTASSIUM mmol/L 3.9 5.0   CHLORIDE mmol/L 105 104   CO2 mmol/L 29 29   BUN mg/dL 28* 30*   CREATININE mg/dL 1.47* 1.42*   ANION GAP mmol/L 4 4   CALCIUM mg/dL 8.8 9.3   ALBUMIN g/dL  --  4.0   TOTAL BILIRUBIN mg/dL  --  0.67   ALK PHOS U/L  --  37   ALT U/L  --  13   AST U/L  --  15   GLUCOSE RANDOM mg/dL 122 142*     Results from last 7 days   Lab Units 12/12/24  1142   INR  1.08     Results from last 7 days   Lab Units 12/13/24  0757 12/12/24  2131 12/12/24  1738 12/12/24  1020 12/12/24  0808 12/11/24  1036 12/11/24  0811 12/10/24  1029 12/10/24  0804 12/09/24  1020 12/09/24  0808 12/06/24  1023   POC GLUCOSE mg/dl 124 202* 196* 185* 169* 267* 227* 203* 237* 199* 222* 216*               Recent Cultures (last 7 days):         Imaging Results Review: I reviewed radiology reports  from this admission including: chest xray.  Other Study Results Review: No additional pertinent studies reviewed.    Last 24 Hours Medication List:     Current Facility-Administered Medications:     acetaminophen (TYLENOL) tablet 650 mg, Q6H PRN    amLODIPine (NORVASC) tablet 5 mg, Daily    aspirin chewable tablet 81 mg, Daily    atorvastatin (LIPITOR) tablet 40 mg, Daily    clopidogrel (PLAVIX) tablet 75 mg, Daily    DULoxetine (CYMBALTA) delayed release capsule 30 mg, Daily    Empagliflozin (JARDIANCE) tablet 10 mg, Daily    enoxaparin (LOVENOX) subcutaneous injection 40 mg, Daily    fenofibrate (TRICOR) tablet 145 mg, Daily    fish oil capsule 2,000 mg, BID    insulin glargine (LANTUS) subcutaneous injection 15 Units 0.15 mL, HS    insulin lispro (HumALOG/ADMELOG) 100 units/mL subcutaneous injection 1-5 Units, HS    insulin lispro (HumALOG/ADMELOG) 100 units/mL subcutaneous injection 1-6 Units, TID AC **AND** Fingerstick Glucose (POCT), TID AC    isosorbide mononitrate (IMDUR) 24 hr tablet 90 mg, Daily    multivitamin-minerals (CENTRUM) tablet 1 tablet, Daily    pantoprazole (PROTONIX) EC tablet 40 mg, Daily    pentoxifylline (TRENtal) ER tablet 400 mg, TID With Meals    pregabalin (LYRICA) capsule 150 mg, BID    ranolazine (RANEXA) 12 hr tablet 1,000 mg, BID    sitaGLIPtin (JANUVIA) tablet 25 mg, Daily    torsemide (DEMADEX) tablet 20 mg, Daily    Administrative Statements   Today, Patient Was Seen By: Kristina Davalos MD  I have spent a total time of 45 minutes in caring for this patient on the day of the visit/encounter including Counseling / Coordination of care, Documenting in the medical record, Reviewing / ordering tests, medicine, procedures  , Obtaining or reviewing history  , and Communicating with other healthcare professionals .    **Please Note: This note may have been constructed using a voice recognition system.**

## 2024-12-13 NOTE — ASSESSMENT & PLAN NOTE
History of acute nonocclusive DVT of left gastrocnemius and peroneal vein, 4/24  Likely provoked in setting of preceding hospitalization  Patient was treated with Coumadin  DVT prophylaxis

## 2024-12-13 NOTE — ASSESSMENT & PLAN NOTE
History of CAD status post CABG in 2002 and status post PCI with NANDINI distal left circumflex 2021  Continue aspirin, isosorbide, Ranexa, statin

## 2024-12-13 NOTE — ASSESSMENT & PLAN NOTE
History of iron deficiency anemia, suspected secondary to GI loss  Status post IV iron infusion  Now improved

## 2024-12-13 NOTE — PLAN OF CARE
Problem: Potential for Falls  Goal: Patient will remain free of falls  Description: INTERVENTIONS:  - Educate patient/family on patient safety including physical limitations  - Instruct patient to call for assistance with activity   - Consult OT/PT to assist with strengthening/mobility   - Keep Call bell within reach  - Keep bed low and locked with side rails adjusted as appropriate  - Keep care items and personal belongings within reach  - Initiate and maintain comfort rounds    Outcome: Progressing     Problem: Prexisting or High Potential for Compromised Skin Integrity  Goal: Skin integrity is maintained or improved  Description: INTERVENTIONS:  - Identify patients at risk for skin breakdown  - Assess and monitor skin integrity  - Assess and monitor nutrition and hydration status  - Monitor labs   - Assess for incontinence   - Turn and reposition patient  - Assist with mobility/ambulation  - Relieve pressure over bony prominences  - Avoid friction and shearing  - Provide appropriate hygiene as needed including keeping skin clean and dry  - Evaluate need for skin moisturizer/barrier cream  - Collaborate with interdisciplinary team   - Patient/family teaching  - Consider wound care consult   Outcome: Progressing     Problem: CARDIOVASCULAR - ADULT  Goal: Maintains optimal cardiac output and hemodynamic stability  Description: INTERVENTIONS:  - Monitor I/O, vital signs and rhythm  - Monitor for S/S and trends of decreased cardiac output  - Administer and titrate ordered vasoactive medications to optimize hemodynamic stability  - Assess quality of pulses, skin color and temperature  - Assess for signs of decreased coronary artery perfusion  - Instruct patient to report change in severity of symptoms  Outcome: Progressing  Goal: Absence of cardiac dysrhythmias or at baseline rhythm  Description: INTERVENTIONS:  - Continuous cardiac monitoring, vital signs, obtain 12 lead EKG if ordered  - Administer antiarrhythmic  and heart rate control medications as ordered  - Monitor electrolytes and administer replacement therapy as ordered  Outcome: Progressing     Problem: SKIN/TISSUE INTEGRITY - ADULT  Goal: Skin Integrity remains intact(Skin Breakdown Prevention)  Description: Assess:  -Perform Dev assessment   -Clean and moisturize skin     Outcome: Progressing

## 2024-12-13 NOTE — ASSESSMENT & PLAN NOTE
Referred to ER from wound care center with concerns of bradycardia  EKG with normal sinus rhythm frequent PVCs in bigeminy  History of bradycardia, metoprolol was recently decreased by cardiology as outpatient  Concern about pseudo bradycardia in setting of PVCs versus true  Telemetry with improved heart rate after holding mars blocking agents but significant PVCs  Hemodynamically stable   Telemetry  Hold metoprolol, clonidine.  Avoid mars blocking agents  Follow-up 2D echo  Cardiology evaluation-patient will likely require a pacemaker

## 2024-12-13 NOTE — ASSESSMENT & PLAN NOTE
9/7/2024 TTE: aortic valve is try leaflet moderately thickened with moderate calcification and moderately reduced mobility. There is mild regurgitation and moderate to severe stenosis with peak gradient of 60 mm Mercury and mean gradient of 32 mmHg  12/13/2024 TTE: pending

## 2024-12-13 NOTE — ASSESSMENT & PLAN NOTE
Lab Results   Component Value Date    HGBA1C 6.2 (H) 10/14/2024       Recent Labs     12/12/24  1020 12/12/24  1738 12/12/24  2131 12/13/24  0757   POCGLU 185* 196* 202* 124       Blood Sugar Average: Last 72 hrs:  (P) 174  Home regimen, Jardiance, Januvia, Lantus 15 units nightly  Continue Jardiance, Januvia  Continue glargine 15 units nightly  Insulin sliding scale  Diabetic diet  Monitor blood glucose trend

## 2024-12-13 NOTE — QUICK NOTE
I was called for acute SOB. Patient was seen and examined at bedside presenting with tachypnea and mild respiratory distress, unable to speak full sentences. O2 sat 84-86% on 2L; O2 increased to 5L and patient sat improved to 89-90%. Patient describes acute SOB within the last 5-10 min associated with mid/lower chest pain. Chart reviewed, patient admitted for bradycardia. ProBNP 1067. Troponin 35 -> 33. CXR appears volume overloaded with consolidation R>L, possible pleural effusion. Received 500ml IVF bolus in ED. Suspect acute SOB due to volume overload. Patient with diminished lung sounds. No lower extremity edema. Patient endorses hx lower extremity DVT and on plavix and asa.  Plan as follows:  Stat EKG   EKG reviewed, NSR with PVCs, HR 80, QTc 612 no ST/T ischemia; no significant change from EKG earlier today 1100  Stat random troponin  Stat D-Dimer  D-Dimer 1.74, age adjusted D-dimer VTE unlikely though can consider CTA r/o PE   1x dose furosemide 40 mg IV

## 2024-12-13 NOTE — UTILIZATION REVIEW
Initial Clinical Review    Observation 12/12/24 @ 1319 converted to inpatient admission 12/13/24 @ 1542 for continued care & tx for bradycardia.     Admission: Date/Time/Statement:   Admission Orders (From admission, onward)       Ordered        12/13/24 1542  INPATIENT ADMISSION  Once            12/12/24 1319  Place in Observation  Once                          Orders Placed This Encounter   Procedures    INPATIENT ADMISSION     Standing Status:   Standing     Number of Occurrences:   1     Level of Care:   Med Surg [16]     Estimated length of stay:   More than 2 Midnights     Certification:   I certify that inpatient services are medically necessary for this patient for a duration of greater than two midnights. See H&P and MD Progress Notes for additional information about the patient's course of treatment.     ED Arrival Information       Expected   -    Arrival   12/12/2024 11:11    Acuity   Emergent              Means of arrival   Wheelchair    Escorted by   Self    Service   Hospitalist    Admission type   Emergency              Arrival complaint   low heart rate sent in by              Chief Complaint   Patient presents with    Slow Heart Rate     States he was at wound care in hyperbaric chamber and is HR was as low as 28 and states sent to ED. States he gets dizzy when he closes his eyes. No chest pain. EKG in progress       Initial Presentation:   79 yom to ER from wound care center for HR in 20's, lightheaded while in hyperbaric chamber. Hx insulin-dependent diabetes, bilateral chronic wound of lower extremity, CHF, MI, CAD, CKD, SUSAN not tolerating CPAP at home, diabetic neuropathy. Presents with generalized weakness, bradycardic with HR in 20's. Admission EKG: SB, 1st degree AVB, PVC's in pattern of bigeminy. Labs: WBC 10.89, BUN 30, Cr 1.42, d-dimer 1.74, PTT 35, BNP 1067, u/a+gluc.  Placed in observation status for bradycardia. Plan: telemetry, cardio consulted, accuchecks.  Per cardio:  bradycardia  Hold beta blocker, monitor telemetry. May need pacemaker.     Observation to IP admission 12/13/24:  Bradycardia POA, mgt per cardio.   Per cardio: overnight 12/12 patient complained of sudden onset of shortness of breath and wheezing, chest x-ray with hypoventilation increasing residual opacities suggestive of interstitial edema. Patient was given IV Lasix with improvement of symptoms. overnight 12/12 patient complained of sudden onset of shortness of breath and wheezing, chest x-ray with hypoventilation increasing residual opacities suggestive of interstitial edema. Patient was given IV Lasix with improvement of symptoms. Volume overload most likely due to frequent PVCs and bradycardia. Continue IV Lasix. Clonidine and beta-blocker on hold.       ED Treatment-Medication Administration from 12/12/2024 1111 to 12/12/2024 1521         Date/Time Order Dose Route Action     12/12/2024 1142 magnesium sulfate 2 g/50 mL IVPB (premix) 2 g 2 g Intravenous New Bag     12/12/2024 1218 sodium chloride 0.9 % bolus 500 mL 500 mL Intravenous New Bag            Scheduled Medications:  Medications 12/04 12/05 12/06 12/07 12/08 12/09 12/10 12/11 12/12 12/13   amLODIPine (NORVASC) tablet 5 mg  Dose: 5 mg  Freq: Daily Route: PO  Start: 12/13/24 0900   Admin Instructions:      Order specific questions:                0846        aspirin chewable tablet 81 mg  Dose: 81 mg  Freq: Daily Route: PO  Start: 12/13/24 0900             0846        atorvastatin (LIPITOR) tablet 40 mg  Dose: 40 mg  Freq: Daily Route: PO  Start: 12/13/24 0900             0846        atropine 1 mg/10 mL injection 1 mg  Dose: 1 mg  Freq: Once Route: IV  Start: 12/12/24 1130 End: 12/12/24 1904   Admin Instructions:               (8731) [C]     1904-D/C'd       clopidogrel (PLAVIX) tablet 75 mg  Dose: 75 mg  Freq: Daily Route: PO  Start: 12/13/24 0900   Admin Instructions:                0846        DULoxetine (CYMBALTA) delayed release capsule 30  mg  Dose: 30 mg  Freq: Daily Route: PO  Start: 12/13/24 0900   Admin Instructions:                0846        Empagliflozin (JARDIANCE) tablet 10 mg  Dose: 10 mg  Freq: Daily Route: PO  Start: 12/13/24 0900   Order specific questions:                0846        enoxaparin (LOVENOX) subcutaneous injection 40 mg  Dose: 40 mg  Freq: Daily Route: SC  Start: 12/13/24 0900   Admin Instructions:                0859        fenofibrate (TRICOR) tablet 145 mg  Dose: 145 mg  Freq: Daily Route: PO  Start: 12/13/24 0900   Admin Instructions:                0846        fish oil capsule 2,000 mg  Dose: 2,000 mg  Freq: 2 times daily Route: PO  Start: 12/12/24 1915   Admin Instructions:               2200 0846 1800        furosemide (LASIX) injection 40 mg  Dose: 40 mg  Freq: Daily Route: IV  Start: 12/13/24 0900   Admin Instructions:      Order specific questions:                0859        furosemide (LASIX) injection 40 mg  Dose: 40 mg  Freq: Once Route: IV  Start: 12/12/24 2030 End: 12/12/24 2039   Admin Instructions:      Order specific questions:               2039         insulin glargine (LANTUS) subcutaneous injection 15 Units 0.15 mL  Dose: 15 Units  Freq: Daily at bedtime Route: SC  Start: 12/12/24 2200   Admin Instructions:               2201 2200        insulin lispro (HumALOG/ADMELOG) 100 units/mL subcutaneous injection 1-5 Units  Dose: 1-5 Units  Freq: Daily at bedtime Route: SC  Start: 12/12/24 2200   Admin Instructions:               2210      2200         insulin lispro (HumALOG/ADMELOG) 100 units/mL subcutaneous injection 1-6 Units  Dose: 1-6 Units  Freq: 3 times daily before meals Route: SC  Start: 12/12/24 1744   Admin Instructions:               (9499) (6132) 3615 3509        isosorbide mononitrate (IMDUR) 24 hr tablet 90 mg  Dose: 90 mg  Freq: Daily Route: PO  Start: 12/13/24 0900   Admin Instructions:      Order specific questions:                0846        lidocaine (LMX) 4 %  cream  Freq: Once Route: TP  Start: 12/09/24 1130 End: 12/09/24 1129   Admin Instructions:      Order specific questions:            1129            lidocaine (LMX) 4 % cream  Freq: Once Route: TP  Start: 12/03/24 1115 End: 12/03/24 1114   Admin Instructions:      Order specific questions:                   magnesium sulfate 2 g/50 mL IVPB (premix) 2 g  Dose: 2 g  Freq: Once Route: IV  Last Dose: Stopped (12/12/24 1242)  Start: 12/12/24 1145 End: 12/12/24 1242   Admin Instructions:               1142     1242         multivitamin-minerals (CENTRUM) tablet 1 tablet  Dose: 1 tablet  Freq: Daily Route: PO  Start: 12/13/24 0900   Admin Instructions:                0846        pantoprazole (PROTONIX) EC tablet 40 mg  Dose: 40 mg  Freq: Daily Route: PO  Start: 12/13/24 0900   Admin Instructions:                0846        pentoxifylline (TRENtal) ER tablet 400 mg  Dose: 400 mg  Freq: 3 times daily with meals Route: PO  Start: 12/13/24 0730   Admin Instructions:                0846     1213     1630        potassium chloride (Klor-Con M10) CR tablet 10 mEq  Dose: 10 mEq  Freq: Once Route: PO  Start: 12/13/24 0545 End: 12/13/24 0632   Admin Instructions:                0632        pregabalin (LYRICA) capsule 150 mg  Dose: 150 mg  Freq: 2 times daily Route: PO  Start: 12/12/24 1915   Admin Instructions:               2200      0846     1800        ranolazine (RANEXA) 12 hr tablet 1,000 mg  Dose: 1,000 mg  Freq: 2 times daily Route: PO  Start: 12/12/24 1915   Admin Instructions:               2210      0846     1800        sitaGLIPtin (JANUVIA) tablet 25 mg  Dose: 25 mg  Freq: Daily Route: PO  Start: 12/13/24 0900   Admin Instructions:                0846        sodium chloride 0.9 % bolus 500 mL  Dose: 500 mL  Freq: Once Route: IV  Last Dose: Stopped (12/12/24 1545)  Start: 12/12/24 1145 End: 12/12/24 1545            1218     1545         torsemide (DEMADEX) tablet 20 mg  Dose: 20 mg  Freq: Daily Route: PO  Start: 12/13/24  0900 End: 12/13/24 0825   Order specific questions:                0825-D/C'd      Legend:       [id number]/0512/0612/0712/0812/0912/1012/1112/1212/13        Continuous Meds Sorted by Name  for Thomas Horne as of 12/04/24 through 12/13/24  Legend:       Medications 12/04 12/05 12/06 12/07 12/08 12/09 12/10 12/11 12/12 12/13               PRN Meds Sorted by Name  for Thomas Horne as of 12/04/24 through 12/13/24  Legend:       Medications 12/04 12/05 12/06 12/07 12/08 12/09 12/10 12/11 12/12 12/13   acetaminophen (TYLENOL) tablet 650 mg  Dose: 650 mg  Freq: Every 6 hours PRN Route: PO  PRN Reasons: mild pain,headaches,fever  Indications of Use: FEVER,HEADACHE,MILD PAIN  Start: 12/12/24 1545                                ED Triage Vitals   Temperature Pulse Respirations Blood Pressure SpO2 Pain Score   12/12/24 1123 12/12/24 1123 12/12/24 1123 12/12/24 1123 12/12/24 1123 12/12/24 1135   (!) 97.1 °F (36.2 °C) (!) 27 18 (!) 0/0 95 % No Pain     Weight (last 2 days)       Date/Time Weight    12/13/24 0805 104 (229.28)    12/13/24 0640 104 (229)    12/12/24 1823 108 (239)    12/12/24 1139 108 (238.1)            Vital Signs (last 3 days)       Date/Time Temp Pulse Resp BP MAP (mmHg) SpO2 Calculated FIO2 (%) - Nasal Cannula Nasal Cannula O2 Flow Rate (L/min) O2 Device Patient Position - Orthostatic VS Cara Coma Scale Score Pain    12/13/24 0900 -- -- -- -- -- -- -- -- -- -- 15 No Pain    12/13/24 08:44:44 -- 58 -- 143/51 82 98 % -- -- -- -- -- --    12/13/24 0805 -- 43 -- 128/59 -- 95 % -- -- -- -- -- --    12/13/24 02:39:51 -- 61 -- 128/59 82 94 % -- -- -- -- -- --    12/13/24 02:39:27 -- 64 -- 128/59 82 95 % -- -- -- Lying -- --    12/12/24 22:43:11 99.4 °F (37.4 °C) 68 19 138/73 95 95 % -- -- -- Lying -- --    12/12/24 22:42:38 99.4 °F (37.4 °C) 68 -- 138/73 95 95 % -- -- -- -- -- --    12/12/24 22:08:11 -- 81 -- 138/73 95 94 % -- -- -- -- -- --    12/12/24 2200 -- -- -- -- -- -- 36 4 L/min Nasal cannula --  15 No Pain    12/12/24 2050 -- 74 -- 173/81 112 91 % -- -- -- -- -- --    12/12/24 20:03:47 -- 82 -- 184/94 124 86 % -- -- -- -- -- --    12/12/24 1903 97.7 °F (36.5 °C) 71 -- 177/78 111 93 % -- -- -- -- -- --    12/12/24 1825 -- -- -- -- -- 97 % -- -- None (Room air) -- -- No Pain    12/12/24 1823 98 °F (36.7 °C) 80 19 129/78 -- -- -- -- -- -- -- --    12/12/24 1700 -- 68 -- -- -- 95 % -- -- -- -- -- --    12/12/24 15:31:14 97.2 °F (36.2 °C) 54 -- 130/56 81 96 % -- -- -- -- -- --    12/12/24 1430 -- 73 20 143/66 95 96 % -- -- -- -- -- --    12/12/24 1330 -- 53 16 144/67 96 100 % -- -- -- -- -- --    12/12/24 1215 -- 55 21 133/63 91 96 % -- -- -- -- -- --    12/12/24 1202 -- -- -- -- -- -- -- -- None (Room air) -- -- --    12/12/24 1200 -- 54 16 116/56 81 96 % -- -- -- -- 15 --    12/12/24 1145 -- 54 21 123/58 84 96 % -- -- -- -- -- --    12/12/24 1135 -- 56 18 142/66 -- 97 % -- -- None (Room air) Lying -- No Pain    12/12/24 1123 97.1 °F (36.2 °C) 27 18 0/0 -- 95 % -- -- None (Room air) Sitting -- --              Pertinent Labs/Diagnostic Test Results:   Radiology:   VAS VENOUS DUPLEX - LOWER LIMB BILATERAL   Final Interpretation by Yakov Mercado DO (12/13 1002)      XR chest portable   Final Interpretation by Rene Lacey MD (12/13 0842)      Hypoventilation with increased interstitial opacities suggesting interstitial edema. Atypical pneumonia is less likely. This finding is new.                  Workstation performed: [id number]           Cardiology:  Echo follow up/limited w/ contrast if indicated   Final Result by Jaziel Castellanos MD (12/13 1227)        Left Ventricle: Left ventricular cavity size is upper normal. Wall    thickness is mildly increased. The left ventricular ejection fraction is    50% by biplane measurement. Systolic function is mildly reduced. There is    mild global hypokinesis with regional variation. Diastolic function is    abnormal.  Left atrial filling pressure is elevated.     IVS:  There is abnormal septal motion consistent with ventricular    pre-excitation.     Right Ventricle: Right ventricular cavity size is mildly dilated.    Systolic function is low normal. Normal tricuspid annular plane systolic    excursion (TAPSE) > 1.7 cm.     Left Atrium: The atrium is moderately dilated (42-48 mL/m2).     Right Atrium: The atrium is mildly dilated.     Aortic Valve: The leaflets are moderately thickened. The leaflets are    moderately calcified. There is severely reduced mobility. There is    moderate to severe stenosis. The peak aortic velocity was measured in the    suprasternal view. The aortic valve peak velocity is 3.46 m/s. The aortic    valve peak gradient is 48 mmHg. The aortic valve mean gradient is 29 mmHg.    The dimensionless velocity index is 0.26. The aortic valve area is 1.05    cm2. The stroke volume index is 36.50 ml/m2.     Mitral Valve: There is mild regurgitation.     Tricuspid Valve: The right ventricular systolic pressure is mildly to    moderately elevated.     Prior TTE study available for comparison. Prior study date: 9/7/2024.    No major change in EF or severity of aortic valve. Frequent PVC are noted         ECG 12 lead   Final Result by Naun Bell MD (12/13 0804)   Baseline artifact   Undetermined rhythm , possibly Sinus with PVCs and PACs   Left axis deviation   Left ventricular hypertrophy with QRS widening   Cannot rule out Septal infarct ST & T wave abnormality, consider lateral    ischemia   Abnormal ECG   Confirmed by Naun Bell ([id number]) on 12/13/2024 8:04:26 AM      ECG 12 lead   Final Result by Naun Bell MD (12/12 1204)   Sinus Rhythm w/ PVCs in Bigeminy   Left axis deviation   Left ventricular hypertrophy with QRS widening   Cannot rule out Septal infarct (cited on or before 09-Apr-2024)   Abnormal ECG      Confirmed by Naun Bell ([id number]) on 12/12/2024 12:04:51 PM      ECG 12 lead   Final Result by Naun Bell MD (12/12 1204)   Sinus bradycardia with  "1st degree A-V block with frequent Premature    ventricular complexes in a pattern of bigeminy   Left axis deviation   Left ventricular hypertrophy with QRS widening   Cannot rule out Septal infarct T wave abnormality, consider lateral    ischemia   Abnormal ECG   Confirmed by Naun Bell ([id number]) on 12/12/2024 12:03:58 PM        GI:  No orders to display           Results from last 7 days   Lab Units 12/13/24  0447 12/12/24  1142   WBC Thousand/uL 10.87* 10.89*   HEMOGLOBIN g/dL 11.9* 12.9   HEMATOCRIT % 38.1 41.3   PLATELETS Thousands/uL 166 199   TOTAL NEUT ABS Thousands/µL  --  8.57*         Results from last 7 days   Lab Units 12/13/24  0447 12/12/24  1142   SODIUM mmol/L 138 137   POTASSIUM mmol/L 3.9 5.0   CHLORIDE mmol/L 105 104   CO2 mmol/L 29 29   ANION GAP mmol/L 4 4   BUN mg/dL 28* 30*   CREATININE mg/dL 1.47* 1.42*   EGFR ml/min/1.73sq m 44 45   CALCIUM mg/dL 8.8 9.3   MAGNESIUM mg/dL 2.1 2.2     Results from last 7 days   Lab Units 12/12/24  1142   AST U/L 15   ALT U/L 13   ALK PHOS U/L 37   TOTAL PROTEIN g/dL 7.4   ALBUMIN g/dL 4.0   TOTAL BILIRUBIN mg/dL 0.67     Results from last 7 days   Lab Units 12/13/24  1129 12/13/24  0757 12/12/24  2131 12/12/24  1738 12/12/24  1020 12/12/24  0808 12/11/24  1036 12/11/24  0811 12/10/24  1029 12/10/24  0804 12/09/24  1020 12/09/24  0808   POC GLUCOSE mg/dl 178* 124 202* 196* 185* 169* 267* 227* 203* 237* 199* 222*     Results from last 7 days   Lab Units 12/13/24  0447 12/12/24  1142   GLUCOSE RANDOM mg/dL 122 142*             No results found for: \"BETA-HYDROXYBUTYRATE\"                   Results from last 7 days   Lab Units 12/12/24  1351 12/12/24  1142   HS TNI 0HR ng/L  --  35   HS TNI 2HR ng/L 33  --    HSTNI D2 ng/L -2  --      Results from last 7 days   Lab Units 12/12/24  2056   D-DIMER QUANTITATIVE ug/ml FEU 1.74*     Results from last 7 days   Lab Units 12/12/24  1142   PROTIME seconds 14.6   INR  1.08   PTT seconds 35*     Results from last 7 days "   Lab Units 12/12/24  1142   TSH 3RD GENERATON uIU/mL 1.890                     Results from last 7 days   Lab Units 12/12/24  1142   BNP pg/mL 1,067*                                     Results from last 7 days   Lab Units 12/12/24  1656   CLARITY UA  Clear   COLOR UA  Yellow   SPEC GRAV UA  1.020   PH UA  5.5   GLUCOSE UA mg/dl 300 (3/10%)*   KETONES UA mg/dl Negative   BLOOD UA  Negative   PROTEIN UA mg/dl Negative   NITRITE UA  Negative   BILIRUBIN UA  Negative   UROBILINOGEN UA (BE) mg/dl <2.0   LEUKOCYTES UA  Negative                                                   Past Medical History:   Diagnosis Date    Anemia     CAD (coronary artery disease)     Callus     Cancer (HCC)     prostate    CHF (congestive heart failure) ([facility])     Chronic kidney disease     Clotting disorder ([facility])     Coronary artery disease 1988    Deep vein thrombosis ([facility])     Diabetes mellitus ([facility])     Difficulty walking     Duodenal ulcer     Ear problems     Heart disease 1988    HL (hearing loss)     Hyperlipidemia     Hypertension     Myocardial infarction ([facility])     Neuropathy     Bilateral feet    Neuropathy in diabetes ([facility])     Plantar fasciitis     Sleep apnea     Could not tolerate CPAP     Present on Admission:   Acute deep vein thrombosis (DVT) of left peroneal vein ([facility])   Acute on chronic diastolic HF (heart failure) ([facility])   Chronic HFpEF/Moderate pHTN (HFpEF) ([facility])   Chronic kidney disease-mineral and bone disorder   Coronary artery disease involving native coronary artery of native heart without angina pectoris   Diabetic ulcer of toe of left foot associated with type 2 diabetes mellitus, limited to breakdown of skin ([facility])   Diabetic ulcer of right midfoot associated with diabetes mellitus due to underlying condition, limited to breakdown of skin ([facility])   Duodenal ulcer   Frequent PVCs   Mixed hyperlipidemia   Multiple gastric ulcers   Iron deficiency anemia due to chronic blood loss   Nonrheumatic aortic valve stenosis    Peripheral artery disease (HCC)   Primary hypertension   Iron deficiency anemia   Stage 3b chronic kidney disease (HCC)      Admitting Diagnosis: Bradycardia [R00.1]  Bigeminy [I49.8]  PVC (premature ventricular contraction) [I49.3]  Slow heart rate [R00.1]  Symptomatic bradycardia [R00.1]  Age/Sex: 79 y.o. male    Network Utilization Review Department  ATTENTION: Please call with any questions or concerns to [phone] and carefully listen to the prompts so that you are directed to the right person. All voicemails are confidential.   For Discharge needs, contact Care Management DC Support Team at [phone] opt. [phone]  Send all requests for admission clinical reviews, approved or denied determinations and any other requests to dedicated fax number below belonging to the campus where the patient is receiving treatment. List of dedicated fax numbers for the Facilities:  FACILITY NAME UR FAX NUMBER   ADMISSION DENIALS (Administrative/Medical Necessity) [phone]   DISCHARGE SUPPORT TEAM (NETWORK) [phone]   PARENT CHILD HEALTH (Maternity/NICU/Pediatrics) [phone]   [facility] [phone]   [facility] [phone]   [facility] [phone]   [facility] [phone]   [facility] [phone]   [facility] [phone]   [facility] [phone]   [facility] [phone]   [facility] [phone]   [facility] [phone]   [facility] [phone]   [facility] [phone]

## 2024-12-13 NOTE — ASSESSMENT & PLAN NOTE
Lab Results   Component Value Date    HGBA1C 6.2 (H) 10/14/2024       Recent Labs     12/12/24  1020 12/12/24  1738 12/12/24  2131 12/13/24  0757   POCGLU 185* 196* 202* 124       Blood Sugar Average: Last 72 hrs:  (P) 174managed per primary team

## 2024-12-13 NOTE — ASSESSMENT & PLAN NOTE
Wt Readings from Last 3 Encounters:   12/13/24 104 kg (229 lb)   12/11/24 107 kg (236 lb)   12/11/24 107 kg (236 lb)   overnight 12/12 patient complained of sudden onset of shortness of breath and wheezing, chest x-ray with hypoventilation increasing residual opacities suggestive of interstitial edema. Patient was given IV Lasix with improvement of symptoms  volume overload most likely due to frequent PVCs and bradycardia  Continue Jardiance, he is currently on 25 mg for diabetes  12/13/2024 will continue IV Lasix at this time at 40 mg daily, hold his torsemide  will hold beta-blocker at this time and continue to monitor  low sodium diet  CHF education  monitor I and O, daily weights and labs

## 2024-12-13 NOTE — ASSESSMENT & PLAN NOTE
Lab Results   Component Value Date    EGFR 44 12/13/2024    EGFR 45 12/12/2024    EGFR 42 10/14/2024    CREATININE 1.47 (H) 12/13/2024    CREATININE 1.42 (H) 12/12/2024    CREATININE 1.51 (H) 10/14/2024   continue to monitor

## 2024-12-13 NOTE — ASSESSMENT & PLAN NOTE
Lab Results   Component Value Date    HGBA1C 6.2 (H) 10/14/2024       Recent Labs     12/12/24  1020 12/12/24  1738 12/12/24  2131 12/13/24  0757   POCGLU 185* 196* 202* 124       Blood Sugar Average: Last 72 hrs:  (P) 174managed  per primary team  patient follows with wound center for his diabetic ulcer and is undergoing hyperbaric oxygen treatments which seem to be successful

## 2024-12-13 NOTE — ASSESSMENT & PLAN NOTE
Wt Readings from Last 3 Encounters:   12/13/24 104 kg (229 lb)   12/11/24 107 kg (236 lb)   12/11/24 107 kg (236 lb)     Echocardiogram 9/24-EF 50%, grade 2 diastolic dysfunction, moderate to severe aortic stenosis,  Patient reports symptoms of shortness of breath at times and with activity.  proBNP 1067  Patient denies any worsening of leg edema or weight gain.  Noted to have increased shortness of breath and oxygen requirement, evening 12/12  Chest x-ray with evidence of interstitial edema, less likely pneumonia  Patient received IV Lasix x 1 with good response and improvement in symptoms  Follow-up 2D echo  Continue IV Lasix  Holding metoprolol currently secondary to bradycardia  Continue Jardiance, torsemide  Monitor intake output, daily weight  Fluid and salt restriction  Follow-up cardiology recommendations

## 2024-12-13 NOTE — ASSESSMENT & PLAN NOTE
Referred to ER from wound care center with concerns of bradycardia  EKG with normal sinus rhythm frequent PVCs in bigeminy  History of bradycardia, metoprolol was recently decreased by cardiology as outpatient  Concerned about pseudo bradycardia in setting of PVCs versus true  Hemodynamically stable e  Telemetry  Hold metoprolol, clonidine.  Avoid mars blocking agents  Check 2D echo  Cardiology evaluation

## 2024-12-13 NOTE — ASSESSMENT & PLAN NOTE
History of CAD status post CABG in 2002 and status post PCI with ANNDINI distal left circumflex 2021  Continue aspirin, isosorbide, Ranexa, statin

## 2024-12-14 LAB
ANION GAP SERPL CALCULATED.3IONS-SCNC: 9 MMOL/L (ref 4–13)
BUN SERPL-MCNC: 29 MG/DL (ref 5–25)
CALCIUM SERPL-MCNC: 9 MG/DL (ref 8.4–10.2)
CHLORIDE SERPL-SCNC: 104 MMOL/L (ref 96–108)
CO2 SERPL-SCNC: 24 MMOL/L (ref 21–32)
CREAT SERPL-MCNC: 1.4 MG/DL (ref 0.6–1.3)
ERYTHROCYTE [DISTWIDTH] IN BLOOD BY AUTOMATED COUNT: 14.2 % (ref 11.6–15.1)
GFR SERPL CREATININE-BSD FRML MDRD: 46 ML/MIN/1.73SQ M
GLUCOSE SERPL-MCNC: 102 MG/DL (ref 65–140)
GLUCOSE SERPL-MCNC: 104 MG/DL (ref 65–140)
GLUCOSE SERPL-MCNC: 131 MG/DL (ref 65–140)
GLUCOSE SERPL-MCNC: 155 MG/DL (ref 65–140)
GLUCOSE SERPL-MCNC: 170 MG/DL (ref 65–140)
HCT VFR BLD AUTO: 39.5 % (ref 36.5–49.3)
HGB BLD-MCNC: 12.5 G/DL (ref 12–17)
MAGNESIUM SERPL-MCNC: 2 MG/DL (ref 1.9–2.7)
MCH RBC QN AUTO: 27.2 PG (ref 26.8–34.3)
MCHC RBC AUTO-ENTMCNC: 31.6 G/DL (ref 31.4–37.4)
MCV RBC AUTO: 86 FL (ref 82–98)
PLATELET # BLD AUTO: 161 THOUSANDS/UL (ref 149–390)
PMV BLD AUTO: 12.4 FL (ref 8.9–12.7)
POTASSIUM SERPL-SCNC: 3.9 MMOL/L (ref 3.5–5.3)
PROCALCITONIN SERPL-MCNC: 0.13 NG/ML
PROCALCITONIN SERPL-MCNC: 0.13 NG/ML
RBC # BLD AUTO: 4.59 MILLION/UL (ref 3.88–5.62)
SODIUM SERPL-SCNC: 137 MMOL/L (ref 135–147)
WBC # BLD AUTO: 8.16 THOUSAND/UL (ref 4.31–10.16)

## 2024-12-14 PROCEDURE — 99232 SBSQ HOSP IP/OBS MODERATE 35: CPT | Performed by: INTERNAL MEDICINE

## 2024-12-14 PROCEDURE — 80048 BASIC METABOLIC PNL TOTAL CA: CPT | Performed by: INTERNAL MEDICINE

## 2024-12-14 PROCEDURE — 99232 SBSQ HOSP IP/OBS MODERATE 35: CPT | Performed by: STUDENT IN AN ORGANIZED HEALTH CARE EDUCATION/TRAINING PROGRAM

## 2024-12-14 PROCEDURE — 84145 PROCALCITONIN (PCT): CPT | Performed by: INTERNAL MEDICINE

## 2024-12-14 PROCEDURE — 82948 REAGENT STRIP/BLOOD GLUCOSE: CPT

## 2024-12-14 PROCEDURE — 85027 COMPLETE CBC AUTOMATED: CPT | Performed by: INTERNAL MEDICINE

## 2024-12-14 PROCEDURE — 83735 ASSAY OF MAGNESIUM: CPT | Performed by: INTERNAL MEDICINE

## 2024-12-14 RX ADMIN — ASPIRIN 81 MG CHEWABLE TABLET 81 MG: 81 TABLET CHEWABLE at 08:53

## 2024-12-14 RX ADMIN — AMLODIPINE BESYLATE 5 MG: 5 TABLET ORAL at 08:53

## 2024-12-14 RX ADMIN — CLOPIDOGREL 75 MG: 75 TABLET ORAL at 08:53

## 2024-12-14 RX ADMIN — Medication 1 TABLET: at 08:53

## 2024-12-14 RX ADMIN — DULOXETINE HYDROCHLORIDE 30 MG: 30 CAPSULE, DELAYED RELEASE ORAL at 08:53

## 2024-12-14 RX ADMIN — PENTOXIFYLLINE 400 MG: 400 TABLET, EXTENDED RELEASE ORAL at 17:26

## 2024-12-14 RX ADMIN — PREGABALIN 150 MG: 75 CAPSULE ORAL at 17:26

## 2024-12-14 RX ADMIN — PANTOPRAZOLE SODIUM 40 MG: 40 TABLET, DELAYED RELEASE ORAL at 08:53

## 2024-12-14 RX ADMIN — RANOLAZINE 1000 MG: 500 TABLET, FILM COATED, EXTENDED RELEASE ORAL at 17:26

## 2024-12-14 RX ADMIN — SITAGLIPTIN 25 MG: 25 TABLET, FILM COATED ORAL at 08:53

## 2024-12-14 RX ADMIN — EMPAGLIFLOZIN 10 MG: 10 TABLET, FILM COATED ORAL at 08:53

## 2024-12-14 RX ADMIN — PREGABALIN 150 MG: 75 CAPSULE ORAL at 08:53

## 2024-12-14 RX ADMIN — PENTOXIFYLLINE 400 MG: 400 TABLET, EXTENDED RELEASE ORAL at 12:17

## 2024-12-14 RX ADMIN — ISOSORBIDE MONONITRATE 90 MG: 60 TABLET, EXTENDED RELEASE ORAL at 08:53

## 2024-12-14 RX ADMIN — OMEGA-3 FATTY ACIDS CAP 1000 MG 2000 MG: 1000 CAP at 17:26

## 2024-12-14 RX ADMIN — PENTOXIFYLLINE 400 MG: 400 TABLET, EXTENDED RELEASE ORAL at 08:55

## 2024-12-14 RX ADMIN — RANOLAZINE 1000 MG: 500 TABLET, FILM COATED, EXTENDED RELEASE ORAL at 08:53

## 2024-12-14 RX ADMIN — FUROSEMIDE 40 MG: 10 INJECTION, SOLUTION INTRAMUSCULAR; INTRAVENOUS at 08:54

## 2024-12-14 RX ADMIN — OMEGA-3 FATTY ACIDS CAP 1000 MG 2000 MG: 1000 CAP at 08:53

## 2024-12-14 RX ADMIN — FENOFIBRATE 145 MG: 145 TABLET, FILM COATED ORAL at 08:53

## 2024-12-14 RX ADMIN — INSULIN GLARGINE 15 UNITS: 100 INJECTION, SOLUTION SUBCUTANEOUS at 21:37

## 2024-12-14 RX ADMIN — INSULIN LISPRO 1 UNITS: 100 INJECTION, SOLUTION INTRAVENOUS; SUBCUTANEOUS at 21:37

## 2024-12-14 RX ADMIN — INSULIN LISPRO 1 UNITS: 100 INJECTION, SOLUTION INTRAVENOUS; SUBCUTANEOUS at 12:17

## 2024-12-14 RX ADMIN — ENOXAPARIN SODIUM 40 MG: 40 INJECTION SUBCUTANEOUS at 08:53

## 2024-12-14 RX ADMIN — ATORVASTATIN CALCIUM 40 MG: 40 TABLET, FILM COATED ORAL at 08:53

## 2024-12-14 NOTE — ASSESSMENT & PLAN NOTE
Lab Results   Component Value Date    HGBA1C 6.2 (H) 10/14/2024       Recent Labs     12/13/24  1129 12/13/24  1653 12/13/24 2057 12/14/24  0721   POCGLU 178* 155* 145* 104       Blood Sugar Average: Last 72 hrs:  (P) [id number]  Home regimen, Jardiance, Januvia, Lantus 15 units nightly  Continue Jardiance, Januvia  Continue glargine 15 units nightly  Insulin sliding scale  Diabetic diet  Monitor blood glucose trend

## 2024-12-14 NOTE — ASSESSMENT & PLAN NOTE
Lab Results   Component Value Date    HGBA1C 6.2 (H) 10/14/2024       Recent Labs     12/13/24  1129 12/13/24  1653 12/13/24 2057 12/14/24  0721   POCGLU 178* 155* 145* 104       Blood Sugar Average: Last 72 hrs:  (P) 157.7390850233653275

## 2024-12-14 NOTE — ASSESSMENT & PLAN NOTE
Wt Readings from Last 3 Encounters:   12/14/24 104 kg (229 lb 4.5 oz)   12/11/24 107 kg (236 lb)   12/11/24 107 kg (236 lb)

## 2024-12-14 NOTE — ASSESSMENT & PLAN NOTE
9/7/2024 TTE: aortic valve is try leaflet moderately thickened with moderate calcification and moderately reduced mobility. There is mild regurgitation and moderate to severe stenosis with peak gradient of 60 mm Mercury and mean gradient of 32 mmHg

## 2024-12-14 NOTE — ASSESSMENT & PLAN NOTE
Lab Results   Component Value Date    HGBA1C 6.2 (H) 10/14/2024       Recent Labs     12/13/24  1129 12/13/24  1653 12/13/24 2057 12/14/24  0721   POCGLU 178* 155* 145* 104       Blood Sugar Average: Last 72 hrs:  (P) [id number]  per primary team  patient follows with wound center for his diabetic ulcer and is undergoing hyperbaric oxygen treatments which seem to be successful

## 2024-12-14 NOTE — PROGRESS NOTES
Progress Note - Hospitalist   Name: Thomas Horne 79 y.o. male I MRN: [id number]  Unit/Bed#: 4 [facility] 420-01 I Date of Admission: 12/12/2024   Date of Service: 12/14/2024 I Hospital Day: 1     Assessment & Plan  Bradycardia  Referred to ER from wound care center with concerns of bradycardia  EKG with normal sinus rhythm frequent PVCs in bigeminy  History of bradycardia, metoprolol was recently decreased by cardiology as outpatient  Concern about pseudo bradycardia in setting of PVCs versus true  Telemetry with improved heart rate after holding mars blocking agents but significant PVCs  Hemodynamically stable   Telemetry  Hold metoprolol, clonidine.  Avoid mars blocking agents  Follow-up 2D echo  Cardiology evaluation-patient will likely require a pacemaker  Planned pacemaker 12/16/2024 with cardiology  Coronary artery disease involving native coronary artery of native heart without angina pectoris  History of CAD status post CABG in 2002 and status post PCI with NANDINI distal left circumflex 2021  Continue aspirin, isosorbide, Ranexa, statin  Acute on chronic HFpEF/Moderate pHTN (HFpEF) (HCC)  Wt Readings from Last 3 Encounters:   12/14/24 104 kg (229 lb 4.5 oz)   12/11/24 107 kg (236 lb)   12/11/24 107 kg (236 lb)     Echocardiogram 9/24-EF 50%, grade 2 diastolic dysfunction, moderate to severe aortic stenosis,  Patient reports symptoms of shortness of breath at times and with activity.  proBNP 1067  Patient denies any worsening of leg edema or weight gain.  Noted to have increased shortness of breath and oxygen requirement, evening 12/12  Chest x-ray with evidence of interstitial edema, less likely pneumonia  Patient received IV Lasix x 1 with good response and improvement in symptoms  Follow-up 2D echo  Continue IV Lasix  Holding metoprolol currently secondary to bradycardia  Continue Jardiance, torsemide  Monitor intake output, daily weight  Fluid and salt restriction  Follow-up cardiology recommendations  Primary  hypertension  Blood pressure stable  Continue amlodipine, isosorbide, torsemide  Holding metoprolol, clonidine in setting of bradycardia  Monitor blood pressure trend  Type 2 diabetes mellitus with diabetic polyneuropathy, with long-term current use of insulin ([facility])  Lab Results   Component Value Date    HGBA1C 6.2 (H) 10/14/2024       Recent Labs     12/13/24  1129 12/13/24  1653 12/13/24  2057 12/14/24  0721   POCGLU 178* 155* 145* 104       Blood Sugar Average: Last 72 hrs:  (P) [id number]  Home regimen, Jardiance, Januvia, Lantus 15 units nightly  Continue Jardiance, Januvia  Continue glargine 15 units nightly  Insulin sliding scale  Diabetic diet  Monitor blood glucose trend  Stage 3b chronic kidney disease (HCC)  With baseline creatinine 1.6-1.8  Currently at baseline  Monitor  Peripheral artery disease ([facility])  Continue aspirin, clopidogrel, pentoxifylline  Multiple open wounds of lower extremity  In setting of peripheral artery disease and diabetes, currently undergoing HBO2 treatment  No evidence of current or history of infection associated with wounds  Mixed hyperlipidemia  Continue atorvastatin  S/P AAA repair    H/O prostate cancer  Status post prostatectomy  Iron deficiency anemia  History of iron deficiency anemia, suspected secondary to GI loss  Status post IV iron infusion  Now improved  History of DVT (deep vein thrombosis)  History of acute nonocclusive DVT of left gastrocnemius and peroneal vein, 4/24  Likely provoked in setting of preceding hospitalization  Patient was treated with Coumadin  D-dimer noted to be elevated likely nonspecific  Recheck venous Doppler lower extremities  DVT prophylaxis    VTE Pharmacologic Prophylaxis: VTE Score: 4 High Risk (Score >/= 5) - Pharmacological DVT Prophylaxis Ordered: enoxaparin (Lovenox). Sequential Compression Devices Ordered.    Mobility:   Basic Mobility Inpatient Raw Score: 21  -HLM Goal: 6: Walk 10 steps or more  -HL Achieved: 6: Walk  10 steps or more  JH-HLM Goal achieved. Continue to encourage appropriate mobility.    Patient Centered Rounds: I performed bedside rounds with nursing staff today.   Discussions with Specialists or Other Care Team Provider: Appreciate cardiology recommendations    Education and Discussions with Family / Patient: Discussed with patient at bedside    Current Length of Stay: 1 day(s)  Current Patient Status: Inpatient   Certification Statement: The patient will continue to require additional inpatient hospital stay due to pacemaker 12/16/2024  Discharge Plan: Anticipate discharge in 48 hrs to home.    Code Status: Level 3 - DNAR and DNI    Subjective   Patient seen and examined at bedside this morning, overall feeling well, no acute concerns, pacemaker planned for Monday.    Objective :  Temp:  [97.9 °F (36.6 °C)-98 °F (36.7 °C)] 97.9 °F (36.6 °C)  HR:  [46-64] 59  BP: (143-154)/(51-74) 154/70  Resp:  [16-19] 18  SpO2:  [94 %-98 %] 94 %    Body mass index is 29.44 kg/m².     Input and Output Summary (last 24 hours):     Intake/Output Summary (Last 24 hours) at 12/14/2024 0842  Last data filed at 12/13/2024 1200  Gross per 24 hour   Intake 600 ml   Output --   Net 600 ml       Physical Exam  Vitals and nursing note reviewed.   Constitutional:       General: He is not in acute distress.     Appearance: He is well-developed.   HENT:      Head: Normocephalic and atraumatic.   Eyes:      General: No scleral icterus.     Conjunctiva/sclera: Conjunctivae normal.   Cardiovascular:      Rate and Rhythm: Normal rate and regular rhythm.      Pulses: Normal pulses.      Heart sounds: No murmur heard.  Pulmonary:      Effort: Pulmonary effort is normal. No respiratory distress.      Breath sounds: Normal breath sounds.   Abdominal:      General: Bowel sounds are normal. There is no distension.      Palpations: Abdomen is soft.      Tenderness: There is no abdominal tenderness.   Musculoskeletal:         General: No swelling.  Normal range of motion.      Cervical back: Neck supple.   Skin:     General: Skin is warm and dry.      Capillary Refill: Capillary refill takes less than 2 seconds.   Neurological:      General: No focal deficit present.      Mental Status: He is alert and oriented to person, place, and time. Mental status is at baseline.   Psychiatric:         Mood and Affect: Mood normal.         Behavior: Behavior normal.           Lines/Drains:        Telemetry:  Telemetry Orders (From admission, onward)               24 Hour Telemetry Monitoring  Continuous x 24 Hours (Telem)        Question:  Reason for 24 Hour Telemetry  Answer:  Arrhythmias requiring acute medical intervention / PPM or ICD malfunction                                  Lab Results: I have reviewed the following results:   Results from last 7 days   Lab Units 12/14/24  0527 12/13/24 0447 12/12/24  1142   WBC Thousand/uL 8.16   < > 10.89*   HEMOGLOBIN g/dL 12.5   < > 12.9   HEMATOCRIT % 39.5   < > 41.3   PLATELETS Thousands/uL 161   < > 199   SEGS PCT %  --   --  79*   LYMPHO PCT %  --   --  13*   MONO PCT %  --   --  6   EOS PCT %  --   --  2    < > = values in this interval not displayed.     Results from last 7 days   Lab Units 12/14/24  0527 12/13/24  0447 12/12/24  1142   SODIUM mmol/L 137   < > 137   POTASSIUM mmol/L 3.9   < > 5.0   CHLORIDE mmol/L 104   < > 104   CO2 mmol/L 24   < > 29   BUN mg/dL 29*   < > 30*   CREATININE mg/dL 1.40*   < > 1.42*   ANION GAP mmol/L 9   < > 4   CALCIUM mg/dL 9.0   < > 9.3   ALBUMIN g/dL  --   --  4.0   TOTAL BILIRUBIN mg/dL  --   --  0.67   ALK PHOS U/L  --   --  37   ALT U/L  --   --  13   AST U/L  --   --  15   GLUCOSE RANDOM mg/dL 102   < > 142*    < > = values in this interval not displayed.     Results from last 7 days   Lab Units 12/12/24  1142   INR  1.08     Results from last 7 days   Lab Units 12/14/24  0721 12/13/24  2057 12/13/24  1653 12/13/24  1129 12/13/24  0757 12/12/24  2131 12/12/24  1731  12/12/24  1020 12/12/24  0808 12/11/24  1036 12/11/24  0811 12/10/24  1029   POC GLUCOSE mg/dl 104 145* 155* 178* 124 202* 196* 185* 169* 267* 227* 203*         Results from last 7 days   Lab Units 12/14/24  0527   PROCALCITONIN ng/ml 0.13  0.13       Recent Cultures (last 7 days):           Last 24 Hours Medication List:     Current Facility-Administered Medications:     acetaminophen (TYLENOL) tablet 650 mg, Q6H PRN    amLODIPine (NORVASC) tablet 5 mg, Daily    aspirin chewable tablet 81 mg, Daily    atorvastatin (LIPITOR) tablet 40 mg, Daily    clopidogrel (PLAVIX) tablet 75 mg, Daily    DULoxetine (CYMBALTA) delayed release capsule 30 mg, Daily    Empagliflozin (JARDIANCE) tablet 10 mg, Daily    enoxaparin (LOVENOX) subcutaneous injection 40 mg, Daily    fenofibrate (TRICOR) tablet 145 mg, Daily    fish oil capsule 2,000 mg, BID    furosemide (LASIX) injection 40 mg, Daily    insulin glargine (LANTUS) subcutaneous injection 15 Units 0.15 mL, HS    insulin lispro (HumALOG/ADMELOG) 100 units/mL subcutaneous injection 1-5 Units, HS    insulin lispro (HumALOG/ADMELOG) 100 units/mL subcutaneous injection 1-6 Units, TID AC **AND** Fingerstick Glucose (POCT), TID AC    isosorbide mononitrate (IMDUR) 24 hr tablet 90 mg, Daily    multivitamin-minerals (CENTRUM) tablet 1 tablet, Daily    pantoprazole (PROTONIX) EC tablet 40 mg, Daily    pentoxifylline (TRENtal) ER tablet 400 mg, TID With Meals    pregabalin (LYRICA) capsule 150 mg, BID    ranolazine (RANEXA) 12 hr tablet 1,000 mg, BID    sitaGLIPtin (JANUVIA) tablet 25 mg, Daily    Administrative Statements   Today, Patient Was Seen By: Celestine Miranda DO      **Please Note: This note may have been constructed using a voice recognition system.**

## 2024-12-14 NOTE — ASSESSMENT & PLAN NOTE
Lab Results   Component Value Date    EGFR 46 12/14/2024    EGFR 44 12/13/2024    EGFR 45 12/12/2024    CREATININE 1.40 (H) 12/14/2024    CREATININE 1.47 (H) 12/13/2024    CREATININE 1.42 (H) 12/12/2024   continue to monitor

## 2024-12-14 NOTE — ASSESSMENT & PLAN NOTE
Lab Results   Component Value Date    HGBA1C 6.2 (H) 10/14/2024       Recent Labs     12/13/24  1129 12/13/24  1653 12/13/24 2057 12/14/24  0721   POCGLU 178* 155* 145* 104       Blood Sugar Average: Last 72 hrs:  (P) [id number]

## 2024-12-14 NOTE — ASSESSMENT & PLAN NOTE
Referred to ER from wound care center with concerns of bradycardia  EKG with normal sinus rhythm frequent PVCs in bigeminy  History of bradycardia, metoprolol was recently decreased by cardiology as outpatient  Concern about pseudo bradycardia in setting of PVCs versus true  Telemetry with improved heart rate after holding mars blocking agents but significant PVCs  Hemodynamically stable   Telemetry  Hold metoprolol, clonidine.  Avoid mars blocking agents  Follow-up 2D echo  Cardiology evaluation-patient will likely require a pacemaker  Planned pacemaker 12/16/2024 with cardiology

## 2024-12-14 NOTE — ASSESSMENT & PLAN NOTE
history of CABG times three in 2002 and PCI with drug-eluting stent to distal left circ in 2021  10/19/2022 [facility]: triple vessel disease with 100% occluded right coronary artery, 100% occluded mid LAD. SVG to RCA was also hundred percent occluded but RCA had collaterals from left circulation. [place] to LAD was widely patent and no flow was noted in the SVG to the circumflex  continue aspirin 81 mg once a day  continue Ranexa 1000 mg BID  Continue Imdur 30 mg daily  continue Norvasc 5 mg daily

## 2024-12-14 NOTE — PROGRESS NOTES
Progress Note - Cardiology   Name: Thomas Horne 79 y.o. male I MRN: [id number]  Unit/Bed#: 4 [address] I Date of Admission: 12/12/2024   Date of Service: 12/14/2024 I Hospital Day: 1     Assessment & Plan  Bradycardia  patient sent from wound center due to bradycardia with pulse ox heart rate of 20 to 30  patient noted to have frequent PVCs in bigemy, and most likely pulse ox was not detecting the PVCs as they are not perfused in the periphery.  Will hold patient's clonidine and beta-blocker at this time and continue to monitor  Permanent pacemaker scheduled for 12/16/2024  Coronary artery disease involving native coronary artery of native heart without angina pectoris  history of CABG times three in 2002 and PCI with drug-eluting stent to distal left circ in 2021  10/19/2022 LHC: triple vessel disease with 100% occluded right coronary artery, 100% occluded mid LAD. SVG to RCA was also hundred percent occluded but RCA had collaterals from left circulation. [place] to LAD was widely patent and no flow was noted in the SVG to the circumflex  continue aspirin 81 mg once a day  continue Ranexa 1000 mg BID  Continue Imdur 30 mg daily  continue Norvasc 5 mg daily  Acute on chronic diastolic HF (heart failure) (HCC)    volume overload most likely due to frequent PVCs and bradycardia  Continue Jardiance, he is currently on 25 mg for diabetes  Feels better and denies any dyspnea today  12/13/2024 will continue IV Lasix at this time at 40 mg daily, hold his torsemide  will hold beta-blocker at this time and continue to monitor  low sodium diet  CHF education  monitor I and O, daily weights and labs    Primary hypertension  Systolic BP fluctuates between 128 from 154  Mixed hyperlipidemia  continue Lipitor 40 mg daily  Nonrheumatic aortic valve stenosis  9/7/2024 TTE: aortic valve is try leaflet moderately thickened with moderate calcification and moderately reduced mobility. There is mild regurgitation and moderate to severe  stenosis with peak gradient of 60 mm Mercury and mean gradient of 32 mmHg    Type 2 diabetes mellitus with diabetic polyneuropathy, with long-term current use of insulin (Formerly [facility])  Lab Results   Component Value Date    HGBA1C 6.2 (H) 10/14/2024       Recent Labs     12/13/24  1129 12/13/24  1653 12/13/24 2057 12/14/24  0721   POCGLU 178* 155* 145* 104       managed per primary team    Peripheral artery disease (Formerly [facility])  follows with vascular surgery,  history of bypass grafting to lower extremities  history of EVAR due to AAA  Chronic kidney disease-mineral and bone disorder  Lab Results   Component Value Date    EGFR 46 12/14/2024    EGFR 44 12/13/2024    EGFR 45 12/12/2024    CREATININE 1.40 (H) 12/14/2024    CREATININE 1.47 (H) 12/13/2024    CREATININE 1.42 (H) 12/12/2024     Stage 3b chronic kidney disease (Formerly [facility])  Lab Results   Component Value Date    EGFR 46 12/14/2024    EGFR 44 12/13/2024    EGFR 45 12/12/2024    CREATININE 1.40 (H) 12/14/2024    CREATININE 1.47 (H) 12/13/2024    CREATININE 1.42 (H) 12/12/2024   continue to monitor  Frequent PVCs  continue to monitor telemetry  Diabetic ulcer of toe of left foot associated with type 2 diabetes mellitus, limited to breakdown of skin (Formerly [facility])  Lab Results   Component Value Date    HGBA1C 6.2 (H) 10/14/2024       Recent Labs     12/13/24  1129 12/13/24  1653 12/13/24 2057 12/14/24  0721   POCGLU 178* 155* 145* 104       Blood Sugar Average: Last 72 hrs:  (P) [id number]  per primary team  patient follows with wound center for his diabetic ulcer and is undergoing hyperbaric oxygen treatments which seem to be successful    S/P angioplasty with stent    S/P AAA repair    S/P prostatectomy    H/O prostate cancer    Acute on chronic HFpEF/Moderate pHTN (HFpEF) (Formerly [facility])  Wt Readings from Last 3 Encounters:   12/14/24 104 kg (229 lb 4.5 oz)   12/11/24 107 kg (236 lb)   12/11/24 107 kg (236 lb)       Diabetic ulcer of right midfoot associated with diabetes mellitus  "due to underlying condition, limited to breakdown of skin (HCC)  Lab Results   Component Value Date    HGBA1C 6.2 (H) 10/14/2024       Recent Labs     12/13/24  1129 12/13/24  1653 12/13/24 2057 12/14/24  0721   POCGLU 178* 155* 145* 104       Blood Sugar Average: Last 72 hrs:  (P) [id number]    Multiple gastric ulcers    Iron deficiency anemia due to chronic blood loss    Duodenal ulcer    Iron deficiency anemia    Acute deep vein thrombosis (DVT) of left peroneal vein (HCC)    History of DVT (deep vein thrombosis)    Multiple open wounds of lower extremity            Subjective / Objective:     Review of Systems   Awake, alert, comfortable.  Denies any cardiac symptoms  Vitals: Blood pressure 154/70, pulse 59, temperature 97.9 °F (36.6 °C), resp. rate 18, height 6' 2\" (1.88 m), weight 104 kg (229 lb 4.5 oz), SpO2 94%.  Vitals:    12/13/24 0805 12/14/24 0600   Weight: 104 kg (229 lb 4.5 oz) 104 kg (229 lb 4.5 oz)     Body mass index is 29.44 kg/m².  BP Readings from Last 3 Encounters:   12/14/24 154/70   12/12/24 163/84   12/11/24 116/60     Orthostatic Blood Pressures      Flowsheet Row Most Recent Value   Blood Pressure 154/70 filed at 12/14/2024 0751   Patient Position - Orthostatic VS Lying filed at 12/13/2024 0239          I/O         12/12 0701  12/13 0700 12/13 0701  12/14 0700 12/14 0701  12/15 0700    P.O. 700 600     I.V. (mL/kg) 20 (0.2)      IV Piggyback 50      Total Intake(mL/kg) 770 (7.4) 600 (5.8)     Urine (mL/kg/hr) 3625 550 (0.2)     Total Output 3625 550     Net -2855 +50                  Invasive Devices       Peripheral Intravenous Line  Duration             Peripheral IV 12/12/24 Distal;Right;Upper;Ventral (anterior) Arm 1 day                      Intake/Output Summary (Last 24 hours) at 12/14/2024 0918  Last data filed at 12/13/2024 1200  Gross per 24 hour   Intake 300 ml   Output --   Net 300 ml         Physical Exam:   Physical Exam    Neurologic:  Alert & oriented x 3,  no " focal deficits noted   Constitutional: Obese  Eyes:  PERRL, conjunctiva normal   HENT:  Atraumatic, external ears normal, nose normal, .  NECK: Normal range of motion, no tenderness, neck is supple , No JVP  Respiratory:  Bilateral air entry, mostly clear to auscultation  Cardiovascular: Regular with occasional irregularity S1-S2 with a I/VI systolic murmur.  No pericardial rub or gallop audible  GI:  Soft, nondistended, audible bowel sounds, nontender, no hepatosplenomegaly appreciated  Musculoskeletal:  No tenderness, no deformities.    Extremities:  No appreciable pitting edema. Distal pulses are present  Psychiatric:  Speech and behavior appropriate             Medications/ Allergies:     Current Facility-Administered Medications   Medication Dose Route Frequency Provider Last Rate    acetaminophen  650 mg Oral Q6H PRN Kristina Davalos MD      amLODIPine  5 mg Oral Daily Kristina Davalos MD      aspirin  81 mg Oral Daily Kristina Davalos MD      atorvastatin  40 mg Oral Daily Kristina Davalos MD      clopidogrel  75 mg Oral Daily Kristina Davalos MD      DULoxetine  30 mg Oral Daily Kristina Davalos MD      Empagliflozin  10 mg Oral Daily Kristina Davalos MD      enoxaparin  40 mg Subcutaneous Daily Kristina Davalos MD      fenofibrate  145 mg Oral Daily Kristina Davalos MD      fish oil  2,000 mg Oral BID Kristina Davalos MD      furosemide  40 mg Intravenous Daily APOLLO Antonio      insulin glargine  15 Units Subcutaneous HS Kristina Davalos MD      insulin lispro  1-5 Units Subcutaneous HS Kristina Davalos MD      insulin lispro  1-6 Units Subcutaneous TID AC Kristina Davalos MD      isosorbide mononitrate  90 mg Oral Daily Kristina Davalos MD      multivitamin-minerals  1 tablet Oral Daily Kristina Davalos MD      pantoprazole  40 mg Oral Daily Kristina Davalos MD      pentoxifylline  400 mg Oral TID With Meals Kristina Davalos MD      pregabalin  150 mg Oral BID MD sheryl Ledesma   "1,000 mg Oral BID Kristina Davalos MD      sitaGLIPtin  25 mg Oral Daily Kristina Davalos MD       acetaminophen, 650 mg, Q6H PRN      Allergies   Allergen Reactions    Lisinopril Rash and Lip Swelling           Labs:   Troponins:      CBC with diff:  Results from last 7 days   Lab Units 12/14/24 0527 12/13/24 0447 12/12/24  1142   WBC Thousand/uL 8.16 10.87* 10.89*   HEMOGLOBIN g/dL 12.5 11.9* 12.9   HEMATOCRIT % 39.5 38.1 41.3   MCV fL 86 86 88   PLATELETS Thousands/uL 161 166 199   RBC Million/uL 4.59 4.41 4.71   MCH pg 27.2 27.0 27.4   MCHC g/dL 31.6 31.2* 31.2*   RDW % 14.2 14.4 14.4   MPV fL 12.4 11.5 11.6   NRBC AUTO /100 WBCs  --   --  0       CMP:  Results from last 7 days   Lab Units 12/14/24 0527 12/13/24 0447 12/12/24  1142   SODIUM mmol/L 137 138 137   POTASSIUM mmol/L 3.9 3.9 5.0   CHLORIDE mmol/L 104 105 104   CO2 mmol/L 24 29 29   ANION GAP mmol/L 9 4 4   BUN mg/dL 29* 28* 30*   CREATININE mg/dL 1.40* 1.47* 1.42*   GLUCOSE FASTING mg/dL  --  122*  --    CALCIUM mg/dL 9.0 8.8 9.3   AST U/L  --   --  15   ALT U/L  --   --  13   ALK PHOS U/L  --   --  37   TOTAL PROTEIN g/dL  --   --  7.4   ALBUMIN g/dL  --   --  4.0   TOTAL BILIRUBIN mg/dL  --   --  0.67   EGFR ml/min/1.73sq m 46 44 45       Magnesium:  Results from last 7 days   Lab Units 12/14/24 0527 12/13/24 0447 12/12/24  1142   MAGNESIUM mg/dL 2.0 2.1 2.2     Coags:  Results from last 7 days   Lab Units 12/12/24  1142   PTT seconds 35*   INR  1.08     TSH:  Results from last 7 days   Lab Units 12/12/24  1142   TSH 3RD GENERATON uIU/mL 1.890     No components found for: \"TSH3\"  Lipid Profile:    Hgb A1c:    NT-proBNP: No results for input(s): \"NTBNP\" in the last 72 hours.     Imaging & Testing   I have personally reviewed pertinent reports.    Echo follow up/limited w/ contrast if indicated  Result Date: 12/13/2024  Narrative:   Left Ventricle: Left ventricular cavity size is upper normal. Wall thickness is mildly increased. The left " ventricular ejection fraction is 50% by biplane measurement. Systolic function is mildly reduced. There is mild global hypokinesis with regional variation. Diastolic function is abnormal.  Left atrial filling pressure is elevated.   IVS: There is abnormal septal motion consistent with ventricular pre-excitation.   Right Ventricle: Right ventricular cavity size is mildly dilated. Systolic function is low normal. Normal tricuspid annular plane systolic excursion (TAPSE) > 1.7 cm.   Left Atrium: The atrium is moderately dilated (42-48 mL/m2).   Right Atrium: The atrium is mildly dilated.   Aortic Valve: The leaflets are moderately thickened. The leaflets are moderately calcified. There is severely reduced mobility. There is moderate to severe stenosis. The peak aortic velocity was measured in the suprasternal view. The aortic valve peak velocity is 3.46 m/s. The aortic valve peak gradient is 48 mmHg. The aortic valve mean gradient is 29 mmHg. The dimensionless velocity index is 0.26. The aortic valve area is 1.05 cm2. The stroke volume index is 36.50 ml/m2.   Mitral Valve: There is mild regurgitation.   Tricuspid Valve: The right ventricular systolic pressure is mildly to moderately elevated.   Prior TTE study available for comparison. Prior study date: 9/7/2024. No major change in EF or severity of aortic valve. Frequent PVC are noted      VAS VENOUS DUPLEX - LOWER LIMB BILATERAL  Result Date: 12/13/2024  Narrative:  [facility]PORT [facility]INICAL: Indications: Patient presents with bilateral lower extremity edema. Patient states history of DVT. Operative History: 2024-08-30 Left distal SFA-below knee pop A BPG 2023-11-16 Right Pr bypass w/vein femoral- below popliteal 2023-11-01 IR LE angiogram 2022-10-19 Cardiac catheterization 2002-01-01 CABG x3 Endovascular abdominal aortic aneurysm, modular bifurcated prosthesis, two docking limb Risk Factors The patient has history of HTN, Diabetes (IDDM), Hyperlipidemia,  CKD, PAD, CAD and previous smoking (quit >10yrs ago).  FINDINGS:  Left       Reflux  Valve Closure Time  Popliteal  Reflux                1.40  Peroneal   Reflux                1.30     CONCLUSION:  Impression: RIGHT LOWER LIMB: No evidence of acute or chronic deep vein thrombosis. No evidence of superficial thrombophlebitis noted. Doppler evaluation shows a normal response to augmentation maneuvers.. Popliteal, posterior tibial and anterior tibial arterial Doppler waveforms are monophasic (Hx BPG).  LEFT LOWER LIMB: No evidence of acute or chronic deep vein thrombosis. No evidence of superficial thrombophlebitis noted. Deep vein reflux noted in the popliteal and peroneal veins. Doppler evaluation shows a normal response to augmentation maneuvers. Popliteal, posterior tibial and anterior tibial arterial Doppler waveforms are monophasic (Hx BPG).  SIGNATURE: Electronically Signed by: MARIZOL DOS SANTOS DO on 2024-12-13 10:02:24 AM    XR chest portable  Result Date: 12/13/2024  Narrative: XR CHEST PORTABLE INDICATION: SOB. COMPARISON: 10/15/2024 FINDINGS: Elevation of the right hemidiaphragm is again demonstrated. Defibrillator pads appear to partially obscure the left hemithorax somewhat limiting the study.. The patient is status post median sternotomy. New interstitial opacities are noted as well as bandlike opacities. Findings suggest pulmonary edema although findings may be exacerbated by hypoventilation with areas of atelectasis. No pneumothorax or definite pleural effusion. Normal cardiomediastinal silhouette. Bones are unremarkable for age. Normal upper abdomen.     Impression: Hypoventilation with increased interstitial opacities suggesting interstitial edema. Atypical pneumonia is less likely. This finding is new. Workstation performed: [id number]     VAS ARTERIAL DUPLEX- LOWER LIMB BILATERAL  Result Date: 12/10/2024  Narrative:  [facility] REPORT [facility]INICAL: Indications:  Patient presents with chronic  right great toe wound.  Left foot wound has started to heal since bypass surgery but the right toe is still not healing.  Patient has been unable to walk for long distances due to wounds but denies claudication while walking short distances. Operative History: 2024-08-30 Left distal SFA-below knee pop A BPG 2023-11-16 Right Pr bypass w/vein femoral- below popliteal 2023-11-01 IR LE angiogram 2022-10-19 Cardiac catheterization 2002-01-01 CABG x3 Endovascular abdominal aortic aneurysm, modular bifurcated prosthesis, two docking limb Risk Factors The patient has history of HTN, Diabetes (IDDM), Hyperlipidemia, CKD, PAD, CAD and previous smoking (quit >10yrs ago). Clinical Right Pressure:  120/ mm Hg, Left Pressure:  126/ mm Hg.  FINDINGS:  Segment                Right                   Left                                          Impression  PSV (cm/s)  Impression  PSV (cm/s)  Inflow Anastomosis                                                 77  Low Thigh (Graft)                                                  78  Near Knee (Graft)                                                  97  Outflow Anastomosis                            50-75%             285  Common Femoral Artery                     120                     112  Prox Profunda                             189                     155  Prox SFA                                  123                     122  Mid SFA                                    24                     132  Dist SFA               Occluded             0                          Proximal Pop                               31                          Distal Pop                                 62                          Tibioperoneal                              77  50-75%             210  Dist Post Tibial       Occluded             0                      93  Prox. Ant. Tibial      Occluded             0                          Dist. Ant. Tibial      Occluded             0                       "94  Dist Peroneal                              40  Occluded             0     CONCLUSION: Impression: RIGHT LOWER LIMB: Evaluation shows evidence of high grade stenosis vs short segment occlusion of the distal superficial femoral artery. 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 The femoral to popliteal artery bypass graft could not be identified. Ankle/Brachial index: 0.47 and in the ischemic range, unable to obtain dorsalis pedis pulse (Prior 0.48). PVR/ PPG tracings are dampened. Metatarsal pressure: was not obtained due to unreliable/severely dampened waveforms. Great toe pressure was not obtained due to unreliable/severely dampened waveforms (Prior 20 mmHg).  LEFT LOWER LIMB: Evidence of 50-75% stenosis noted at the tibioperoneal trunk/distal anastomosis of the distal superficial femoral- below knee popliteal bypass graft. Evidence of peroneal artery occlusive disease. Ankle/Brachial index: 0.79, which is in the moderate disease category (Prior: 0.48). PVR/ PPG tracings are dampened. Metatarsal pressure of 105 mmHg (Prior not obtained due to absent waverforms). Great toe pressure of 51 mmHg, which is borderline within the healing range (Prior not obtained due to absent waveforms).  Compared to previous study on 07/29/2024, there is significant improvement in the left FABIOLA/TBI. The right TBI is now absent with evidence of superficial femoral artery occlusive disease.  SIGNATURE: Electronically Signed by: ALYSSA ANG on 2024-12-10 08:38:55 PM       EKG / Monitor: Personally reviewed.    Sinus rhythm with PVCs at 66/min              Dr. Naun Bell MD, FACC      \"This note has been constructed using a voice recognition system.Therefore there may be syntax, spelling, and/or grammatical errors. Please call if you have any questions. \"  "

## 2024-12-14 NOTE — ASSESSMENT & PLAN NOTE
Lab Results   Component Value Date    HGBA1C 6.2 (H) 10/14/2024       Recent Labs     12/13/24  1129 12/13/24  1653 12/13/24 2057 12/14/24  0721   POCGLU 178* 155* 145* 104       managed per primary team

## 2024-12-14 NOTE — ASSESSMENT & PLAN NOTE
volume overload most likely due to frequent PVCs and bradycardia  Continue Jardiance, he is currently on 25 mg for diabetes  Feels better and denies any dyspnea today  12/13/2024 will continue IV Lasix at this time at 40 mg daily, hold his torsemide  will hold beta-blocker at this time and continue to monitor  low sodium diet  CHF education  monitor I and O, daily weights and labs

## 2024-12-14 NOTE — ASSESSMENT & PLAN NOTE
patient sent from [facility] center due to bradycardia with pulse ox heart rate of 20 to 30  patient noted to have frequent PVCs in bigemy, and most likely pulse ox was not detecting the PVCs as they are not perfused in the periphery.  Will hold patient's clonidine and beta-blocker at this time and continue to monitor  Permanent pacemaker scheduled for 12/16/2024

## 2024-12-14 NOTE — ASSESSMENT & PLAN NOTE
Wt Readings from Last 3 Encounters:   12/14/24 104 kg (229 lb 4.5 oz)   12/11/24 107 kg (236 lb)   12/11/24 107 kg (236 lb)     Echocardiogram 9/24-EF 50%, grade 2 diastolic dysfunction, moderate to severe aortic stenosis,  Patient reports symptoms of shortness of breath at times and with activity.  proBNP 1067  Patient denies any worsening of leg edema or weight gain.  Noted to have increased shortness of breath and oxygen requirement, evening 12/12  Chest x-ray with evidence of interstitial edema, less likely pneumonia  Patient received IV Lasix x 1 with good response and improvement in symptoms  Follow-up 2D echo  Continue IV Lasix  Holding metoprolol currently secondary to bradycardia  Continue Jardiance, torsemide  Monitor intake output, daily weight  Fluid and salt restriction  Follow-up cardiology recommendations

## 2024-12-14 NOTE — ASSESSMENT & PLAN NOTE
Lab Results   Component Value Date    EGFR 46 12/14/2024    EGFR 44 12/13/2024    EGFR 45 12/12/2024    CREATININE 1.40 (H) 12/14/2024    CREATININE 1.47 (H) 12/13/2024    CREATININE 1.42 (H) 12/12/2024

## 2024-12-15 LAB
ANION GAP SERPL CALCULATED.3IONS-SCNC: 8 MMOL/L (ref 4–13)
BUN SERPL-MCNC: 33 MG/DL (ref 5–25)
CALCIUM SERPL-MCNC: 9.8 MG/DL (ref 8.4–10.2)
CHLORIDE SERPL-SCNC: 103 MMOL/L (ref 96–108)
CO2 SERPL-SCNC: 29 MMOL/L (ref 21–32)
CREAT SERPL-MCNC: 1.43 MG/DL (ref 0.6–1.3)
ERYTHROCYTE [DISTWIDTH] IN BLOOD BY AUTOMATED COUNT: 14.1 % (ref 11.6–15.1)
GFR SERPL CREATININE-BSD FRML MDRD: 45 ML/MIN/1.73SQ M
GLUCOSE SERPL-MCNC: 105 MG/DL (ref 65–140)
GLUCOSE SERPL-MCNC: 111 MG/DL (ref 65–140)
GLUCOSE SERPL-MCNC: 186 MG/DL (ref 65–140)
GLUCOSE SERPL-MCNC: 202 MG/DL (ref 65–140)
GLUCOSE SERPL-MCNC: 210 MG/DL (ref 65–140)
HCT VFR BLD AUTO: 43.2 % (ref 36.5–49.3)
HGB BLD-MCNC: 13.8 G/DL (ref 12–17)
MCH RBC QN AUTO: 27.1 PG (ref 26.8–34.3)
MCHC RBC AUTO-ENTMCNC: 31.9 G/DL (ref 31.4–37.4)
MCV RBC AUTO: 85 FL (ref 82–98)
PLATELET # BLD AUTO: 223 THOUSANDS/UL (ref 149–390)
PMV BLD AUTO: 11.8 FL (ref 8.9–12.7)
POTASSIUM SERPL-SCNC: 3.7 MMOL/L (ref 3.5–5.3)
RBC # BLD AUTO: 5.09 MILLION/UL (ref 3.88–5.62)
SODIUM SERPL-SCNC: 140 MMOL/L (ref 135–147)
WBC # BLD AUTO: 8.39 THOUSAND/UL (ref 4.31–10.16)

## 2024-12-15 PROCEDURE — 85027 COMPLETE CBC AUTOMATED: CPT | Performed by: INTERNAL MEDICINE

## 2024-12-15 PROCEDURE — 99232 SBSQ HOSP IP/OBS MODERATE 35: CPT | Performed by: INTERNAL MEDICINE

## 2024-12-15 PROCEDURE — 80048 BASIC METABOLIC PNL TOTAL CA: CPT | Performed by: INTERNAL MEDICINE

## 2024-12-15 PROCEDURE — 82948 REAGENT STRIP/BLOOD GLUCOSE: CPT

## 2024-12-15 RX ORDER — ECHINACEA PURPUREA EXTRACT 125 MG
1 TABLET ORAL
Status: DISCONTINUED | OUTPATIENT
Start: 2024-12-15 | End: 2024-12-17 | Stop reason: HOSPADM

## 2024-12-15 RX ADMIN — OMEGA-3 FATTY ACIDS CAP 1000 MG 2000 MG: 1000 CAP at 17:40

## 2024-12-15 RX ADMIN — ATORVASTATIN CALCIUM 40 MG: 40 TABLET, FILM COATED ORAL at 08:49

## 2024-12-15 RX ADMIN — Medication 1 TABLET: at 08:49

## 2024-12-15 RX ADMIN — FUROSEMIDE 40 MG: 10 INJECTION, SOLUTION INTRAMUSCULAR; INTRAVENOUS at 08:49

## 2024-12-15 RX ADMIN — RANOLAZINE 1000 MG: 500 TABLET, FILM COATED, EXTENDED RELEASE ORAL at 17:40

## 2024-12-15 RX ADMIN — DULOXETINE HYDROCHLORIDE 30 MG: 30 CAPSULE, DELAYED RELEASE ORAL at 08:49

## 2024-12-15 RX ADMIN — AMLODIPINE BESYLATE 5 MG: 5 TABLET ORAL at 08:49

## 2024-12-15 RX ADMIN — SITAGLIPTIN 25 MG: 25 TABLET, FILM COATED ORAL at 08:49

## 2024-12-15 RX ADMIN — RANOLAZINE 1000 MG: 500 TABLET, FILM COATED, EXTENDED RELEASE ORAL at 08:49

## 2024-12-15 RX ADMIN — PREGABALIN 150 MG: 75 CAPSULE ORAL at 08:49

## 2024-12-15 RX ADMIN — INSULIN LISPRO 2 UNITS: 100 INJECTION, SOLUTION INTRAVENOUS; SUBCUTANEOUS at 11:40

## 2024-12-15 RX ADMIN — PENTOXIFYLLINE 400 MG: 400 TABLET, EXTENDED RELEASE ORAL at 17:40

## 2024-12-15 RX ADMIN — PREGABALIN 150 MG: 75 CAPSULE ORAL at 17:41

## 2024-12-15 RX ADMIN — OMEGA-3 FATTY ACIDS CAP 1000 MG 2000 MG: 1000 CAP at 08:49

## 2024-12-15 RX ADMIN — ISOSORBIDE MONONITRATE 90 MG: 60 TABLET, EXTENDED RELEASE ORAL at 08:49

## 2024-12-15 RX ADMIN — EMPAGLIFLOZIN 10 MG: 10 TABLET, FILM COATED ORAL at 08:49

## 2024-12-15 RX ADMIN — INSULIN LISPRO 1 UNITS: 100 INJECTION, SOLUTION INTRAVENOUS; SUBCUTANEOUS at 17:39

## 2024-12-15 RX ADMIN — FENOFIBRATE 145 MG: 145 TABLET, FILM COATED ORAL at 08:49

## 2024-12-15 RX ADMIN — PENTOXIFYLLINE 400 MG: 400 TABLET, EXTENDED RELEASE ORAL at 11:41

## 2024-12-15 RX ADMIN — PENTOXIFYLLINE 400 MG: 400 TABLET, EXTENDED RELEASE ORAL at 08:49

## 2024-12-15 RX ADMIN — INSULIN LISPRO 1 UNITS: 100 INJECTION, SOLUTION INTRAVENOUS; SUBCUTANEOUS at 21:32

## 2024-12-15 RX ADMIN — INSULIN GLARGINE 15 UNITS: 100 INJECTION, SOLUTION SUBCUTANEOUS at 21:33

## 2024-12-15 RX ADMIN — CLOPIDOGREL 75 MG: 75 TABLET ORAL at 08:49

## 2024-12-15 RX ADMIN — ENOXAPARIN SODIUM 40 MG: 40 INJECTION SUBCUTANEOUS at 08:49

## 2024-12-15 RX ADMIN — PANTOPRAZOLE SODIUM 40 MG: 40 TABLET, DELAYED RELEASE ORAL at 08:49

## 2024-12-15 RX ADMIN — ASPIRIN 81 MG CHEWABLE TABLET 81 MG: 81 TABLET CHEWABLE at 08:48

## 2024-12-15 NOTE — PROGRESS NOTES
Progress Note - Cardiology   Name: Thomas Horne 79 y.o. male I MRN: [id number]  Unit/Bed#: 4 [address] I Date of Admission: 12/12/2024   Date of Service: 12/15/2024 I Hospital Day: 2     Assessment & Plan  Bradycardia  patient sent from wound center due to bradycardia with pulse ox heart rate of 20 to 30  patient noted to have frequent PVCs in bigemy, and most likely pulse ox was not detecting the PVCs as they are not perfused in the periphery.  Will hold patient's clonidine and beta-blocker at this time and continue to monitor  Permanent pacemaker scheduled for 12/16/2024    Coronary artery disease involving native coronary artery of native heart without angina pectoris  history of CABG times three in 2002 and PCI with drug-eluting stent to distal left circ in 2021  10/19/2022 [facility]: triple vessel disease with 100% occluded right coronary artery, 100% occluded mid LAD. SVG to RCA was also hundred percent occluded but RCA had collaterals from left circulation. Lima to LAD was widely patent and no flow was noted in the SVG to the circumflex  continue aspirin 81 mg once a day  continue Ranexa 1000 mg BID  Continue Imdur 30 mg daily  continue Norvasc 5 mg daily  Acute on chronic diastolic HF (heart failure) (HCC)    volume overload most likely due to frequent PVCs and bradycardia  Continue Jardiance, he is currently on 25 mg for diabetes  Feels better and denies any dyspnea today  12/13/2024 will continue IV Lasix at this time at 40 mg daily, hold his torsemide  will hold beta-blocker at this time and continue to monitor  low sodium diet  CHF education  monitor I and O, daily weights and labs    Primary hypertension  Systolic BP fluctuates between 128 from 154  Mixed hyperlipidemia  continue Lipitor 40 mg daily  Nonrheumatic aortic valve stenosis  9/7/2024 TTE: aortic valve is try leaflet moderately thickened with moderate calcification and moderately reduced mobility. There is mild regurgitation and moderate to  severe stenosis with peak gradient of 60 mm Mercury and mean gradient of 32 mmHg    Type 2 diabetes mellitus with diabetic polyneuropathy, with long-term current use of insulin ([facility])  Lab Results   Component Value Date    HGBA1C 6.2 (H) 10/14/2024       Recent Labs     12/14/24  1046 12/14/24  1547 12/14/24  2007 12/15/24  0742   POCGLU 155* 131 170* 111       managed per primary team    Peripheral artery disease ([facility])  follows with vascular surgery,  history of bypass grafting to lower extremities  history of EVAR due to AAA  Chronic kidney disease-mineral and bone disorder  Lab Results   Component Value Date    EGFR 45 12/15/2024    EGFR 46 12/14/2024    EGFR 44 12/13/2024    CREATININE 1.43 (H) 12/15/2024    CREATININE 1.40 (H) 12/14/2024    CREATININE 1.47 (H) 12/13/2024     Stage 3b chronic kidney disease ([facility])  Lab Results   Component Value Date    EGFR 45 12/15/2024    EGFR 46 12/14/2024    EGFR 44 12/13/2024    CREATININE 1.43 (H) 12/15/2024    CREATININE 1.40 (H) 12/14/2024    CREATININE 1.47 (H) 12/13/2024   continue to monitor  Frequent PVCs  continue to monitor telemetry  Diabetic ulcer of toe of left foot associated with type 2 diabetes mellitus, limited to breakdown of skin ([facility])  Lab Results   Component Value Date    HGBA1C 6.2 (H) 10/14/2024       Recent Labs     12/14/24  1046 12/14/24  1547 12/14/24  2007 12/15/24  0742   POCGLU 155* 131 170* 111       Blood Sugar Average: Last 72 hrs:  (P) [id number]  per primary team  patient follows with wound center for his diabetic ulcer and is undergoing hyperbaric oxygen treatments which seem to be successful    S/P angioplasty with stent    S/P AAA repair    S/P prostatectomy    H/O prostate cancer    Acute on chronic HFpEF/Moderate pHTN (HFpEF) ([facility])  Wt Readings from Last 3 Encounters:   12/15/24 101 kg (223 lb)   12/11/24 107 kg (236 lb)   12/11/24 107 kg (236 lb)       Multiple gastric ulcers    Iron deficiency anemia due to chronic  "blood loss    Duodenal ulcer    Iron deficiency anemia    Acute deep vein thrombosis (DVT) of left peroneal vein (HCC)    History of DVT (deep vein thrombosis)    Multiple open wounds of lower extremity                Subjective / Objective:     Review of Systems  Awake, alert, comfortable.  Denies chest pain, dyspnea or syncope  Vitals: Blood pressure 145/69, pulse (!) 54, temperature 97.8 °F (36.6 °C), resp. rate 20, height 6' 2\" (1.88 m), weight 101 kg (223 lb), SpO2 94%.  Vitals:    12/14/24 0600 12/15/24 0600   Weight: 104 kg (229 lb 4.5 oz) 101 kg (223 lb)     Body mass index is 28.63 kg/m².  BP Readings from Last 3 Encounters:   12/15/24 145/69   12/12/24 163/84   12/11/24 116/60     Orthostatic Blood Pressures      Flowsheet Row Most Recent Value   Blood Pressure 145/69 filed at 12/15/2024 0746   Patient Position - Orthostatic VS Lying filed at 12/13/2024 0239          I/O         12/13 0701  12/14 0700 12/14 0701  12/15 0700 12/15 0701  12/16 0700    P.O. 600 1700     I.V. (mL/kg)       IV Piggyback       Total Intake(mL/kg) 600 (5.8) 1700 (16.8)     Urine (mL/kg/hr) 550 (0.2) 2200 (0.9)     Total Output 550 2200     Net +50 -500                  Invasive Devices       Peripheral Intravenous Line  Duration             Peripheral IV 12/12/24 Distal;Right;Upper;Ventral (anterior) Arm 2 days                      Intake/Output Summary (Last 24 hours) at 12/15/2024 0849  Last data filed at 12/15/2024 0401  Gross per 24 hour   Intake 1200 ml   Output 2200 ml   Net -1000 ml         Physical Exam:      Neurologic:  Alert & oriented x 3,  no focal deficits noted   Constitutional: Obese  Eyes:  PERRL, conjunctiva normal   HENT:  Atraumatic, external ears normal, nose normal, .  NECK: Normal range of motion, no tenderness, neck is supple , No JVP  Respiratory:  Bilateral air entry, mostly clear to auscultation  Cardiovascular: Regular with occasional irregularity S1-S2 with a I/VI systolic murmur.  No pericardial rub " or gallop audible  GI:  Soft, nondistended, audible bowel sounds, nontender, no hepatosplenomegaly appreciated  Musculoskeletal:  No tenderness, no deformities.    Extremities:  No appreciable pitting edema. Distal pulses are present  Psychiatric:  Speech and behavior appropriate           Medications/ Allergies:     Current Facility-Administered Medications   Medication Dose Route Frequency Provider Last Rate    acetaminophen  650 mg Oral Q6H PRN Kristina Davalos MD      amLODIPine  5 mg Oral Daily Kristina Davalos MD      aspirin  81 mg Oral Daily Kristina Davalos MD      atorvastatin  40 mg Oral Daily Kristnia Davalos MD      clopidogrel  75 mg Oral Daily Kristina Davalos MD      DULoxetine  30 mg Oral Daily Kristina Davalos MD      Empagliflozin  10 mg Oral Daily Kristina Davalos MD      enoxaparin  40 mg Subcutaneous Daily Kristina Davalos MD      fenofibrate  145 mg Oral Daily Kristina Davalos MD      fish oil  2,000 mg Oral BID Kristina Davalos MD      furosemide  40 mg Intravenous Daily APOLLO Antonio      insulin glargine  15 Units Subcutaneous HS Kristina Davalos MD      insulin lispro  1-5 Units Subcutaneous HS Kristina Davalos MD      insulin lispro  1-6 Units Subcutaneous TID AC Kristina Davalos MD      isosorbide mononitrate  90 mg Oral Daily Kristina Davalos MD      multivitamin-minerals  1 tablet Oral Daily Kristina Davalos MD      pantoprazole  40 mg Oral Daily Kristina Davalos MD      pentoxifylline  400 mg Oral TID With Meals Kristina Davalos MD      pregabalin  150 mg Oral BID Kristina Davalos MD      ranolazine  1,000 mg Oral BID Kristina Davalos MD      sitaGLIPtin  25 mg Oral Daily Kristina Davalos MD       acetaminophen, 650 mg, Q6H PRN      Allergies   Allergen Reactions    Lisinopril Rash and Lip Swelling           Labs:   Troponins:      CBC with diff:  Results from last 7 days   Lab Units 12/15/24  0442 12/14/24  0527 12/13/24  0447 12/12/24  1142   WBC Thousand/uL 8.39 8.16 10.87*  "10.89*   HEMOGLOBIN g/dL 13.8 12.5 11.9* 12.9   HEMATOCRIT % 43.2 39.5 38.1 41.3   MCV fL 85 86 86 88   PLATELETS Thousands/uL 223 161 166 199   RBC Million/uL 5.09 4.59 4.41 4.71   MCH pg 27.1 27.2 27.0 27.4   MCHC g/dL 31.9 31.6 31.2* 31.2*   RDW % 14.1 14.2 14.4 14.4   MPV fL 11.8 12.4 11.5 11.6   NRBC AUTO /100 WBCs  --   --   --  0       CMP:  Results from last 7 days   Lab Units 12/15/24  0442 12/14/24  0527 12/13/24  0447 12/12/24  1142   SODIUM mmol/L 140 137 138 137   POTASSIUM mmol/L 3.7 3.9 3.9 5.0   CHLORIDE mmol/L 103 104 105 104   CO2 mmol/L 29 24 29 29   ANION GAP mmol/L 8 9 4 4   BUN mg/dL 33* 29* 28* 30*   CREATININE mg/dL 1.43* 1.40* 1.47* 1.42*   GLUCOSE FASTING mg/dL  --   --  122*  --    CALCIUM mg/dL 9.8 9.0 8.8 9.3   AST U/L  --   --   --  15   ALT U/L  --   --   --  13   ALK PHOS U/L  --   --   --  37   TOTAL PROTEIN g/dL  --   --   --  7.4   ALBUMIN g/dL  --   --   --  4.0   TOTAL BILIRUBIN mg/dL  --   --   --  0.67   EGFR ml/min/1.73sq m 45 46 44 45       Magnesium:  Results from last 7 days   Lab Units 12/14/24 0527 12/13/24 0447 12/12/24  1142   MAGNESIUM mg/dL 2.0 2.1 2.2     Coags:  Results from last 7 days   Lab Units 12/12/24  1142   PTT seconds 35*   INR  1.08     TSH:  Results from last 7 days   Lab Units 12/12/24  1142   TSH 3RD GENERATON uIU/mL 1.890     No components found for: \"TSH3\"  Lipid Profile:    Hgb A1c:    NT-proBNP: No results for input(s): \"NTBNP\" in the last 72 hours.     Imaging & Testing   I have personally reviewed pertinent reports.    Echo follow up/limited w/ contrast if indicated  Result Date: 12/13/2024  Narrative:   Left Ventricle: Left ventricular cavity size is upper normal. Wall thickness is mildly increased. The left ventricular ejection fraction is 50% by biplane measurement. Systolic function is mildly reduced. There is mild global hypokinesis with regional variation. Diastolic function is abnormal.  Left atrial filling pressure is elevated.   IVS: " There is abnormal septal motion consistent with ventricular pre-excitation.   Right Ventricle: Right ventricular cavity size is mildly dilated. Systolic function is low normal. Normal tricuspid annular plane systolic excursion (TAPSE) > 1.7 cm.   Left Atrium: The atrium is moderately dilated (42-48 mL/m2).   Right Atrium: The atrium is mildly dilated.   Aortic Valve: The leaflets are moderately thickened. The leaflets are moderately calcified. There is severely reduced mobility. There is moderate to severe stenosis. The peak aortic velocity was measured in the suprasternal view. The aortic valve peak velocity is 3.46 m/s. The aortic valve peak gradient is 48 mmHg. The aortic valve mean gradient is 29 mmHg. The dimensionless velocity index is 0.26. The aortic valve area is 1.05 cm2. The stroke volume index is 36.50 ml/m2.   Mitral Valve: There is mild regurgitation.   Tricuspid Valve: The right ventricular systolic pressure is mildly to moderately elevated.   Prior TTE study available for comparison. Prior study date: 9/7/2024. No major change in EF or severity of aortic valve. Frequent PVC are noted      VAS VENOUS DUPLEX - LOWER LIMB BILATERAL  Result Date: 12/13/2024  Narrative:  [facility] REPORT [facility]INICAL: Indications: Patient presents with bilateral lower extremity edema. Patient states history of DVT. Operative History: 2024-08-30 Left distal SFA-below knee pop A BPG 2023-11-16 Right Pr bypass w/vein femoral- below popliteal 2023-11-01 IR LE angiogram 2022-10-19 Cardiac catheterization 2002-01-01 CABG x3 Endovascular abdominal aortic aneurysm, modular bifurcated prosthesis, two docking limb Risk Factors The patient has history of HTN, Diabetes (IDDM), Hyperlipidemia, CKD, PAD, CAD and previous smoking (quit >10yrs ago).  FINDINGS:  Left       Reflux  Valve Closure Time  Popliteal  Reflux                1.40  Peroneal   Reflux                1.30     CONCLUSION:  Impression: RIGHT LOWER LIMB: No  evidence of acute or chronic deep vein thrombosis. No evidence of superficial thrombophlebitis noted. Doppler evaluation shows a normal response to augmentation maneuvers.. Popliteal, posterior tibial and anterior tibial arterial Doppler waveforms are monophasic (Hx BPG).  LEFT LOWER LIMB: No evidence of acute or chronic deep vein thrombosis. No evidence of superficial thrombophlebitis noted. Deep vein reflux noted in the popliteal and peroneal veins. Doppler evaluation shows a normal response to augmentation maneuvers. Popliteal, posterior tibial and anterior tibial arterial Doppler waveforms are monophasic (Hx BPG).  SIGNATURE: Electronically Signed by: MARIZOL DOS SANTOS DO on 2024-12-13 10:02:24 AM    XR chest portable  Result Date: 12/13/2024  Narrative: XR CHEST PORTABLE INDICATION: SOB. COMPARISON: 10/15/2024 FINDINGS: Elevation of the right hemidiaphragm is again demonstrated. Defibrillator pads appear to partially obscure the left hemithorax somewhat limiting the study.. The patient is status post median sternotomy. New interstitial opacities are noted as well as bandlike opacities. Findings suggest pulmonary edema although findings may be exacerbated by hypoventilation with areas of atelectasis. No pneumothorax or definite pleural effusion. Normal cardiomediastinal silhouette. Bones are unremarkable for age. Normal upper abdomen.     Impression: Hypoventilation with increased interstitial opacities suggesting interstitial edema. Atypical pneumonia is less likely. This finding is new. Workstation performed: [id number]     VAS ARTERIAL DUPLEX- LOWER LIMB BILATERAL  Result Date: 12/10/2024  Narrative:  [facility] REPORT [facility]INICAL: Indications:  Patient presents with chronic right great toe wound.  Left foot wound has started to heal since bypass surgery but the right toe is still not healing.  Patient has been unable to walk for long distances due to wounds but denies claudication while walking short  distances. Operative History: 2024-08-30 Left distal SFA-below knee pop A BPG 2023-11-16 Right Pr bypass w/vein femoral- below popliteal 2023-11-01 IR LE angiogram 2022-10-19 Cardiac catheterization 2002-01-01 CABG x3 Endovascular abdominal aortic aneurysm, modular bifurcated prosthesis, two docking limb Risk Factors The patient has history of HTN, Diabetes (IDDM), Hyperlipidemia, CKD, PAD, CAD and previous smoking (quit >10yrs ago). Clinical Right Pressure:  120/ mm Hg, Left Pressure:  126/ mm Hg.  FINDINGS:  Segment                Right                   Left                                          Impression  PSV (cm/s)  Impression  PSV (cm/s)  Inflow Anastomosis                                                 77  Low Thigh (Graft)                                                  78  Near Knee (Graft)                                                  97  Outflow Anastomosis                            50-75%             285  Common Femoral Artery                     120                     112  Prox Profunda                             189                     155  Prox SFA                                  123                     122  Mid SFA                                    24                     132  Dist SFA               Occluded             0                          Proximal Pop                               31                          Distal Pop                                 62                          Tibioperoneal                              77  50-75%             210  Dist Post Tibial       Occluded             0                      93  Prox. Ant. Tibial      Occluded             0                          Dist. Ant. Tibial      Occluded             0                      94  Dist Peroneal                              40  Occluded             0     CONCLUSION: Impression: RIGHT LOWER LIMB: Evaluation shows evidence of high grade stenosis vs short segment occlusion of the distal superficial femoral  "artery. 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 The femoral to popliteal artery bypass graft could not be identified. Ankle/Brachial index: 0.47 and in the ischemic range, unable to obtain dorsalis pedis pulse (Prior 0.48). PVR/ PPG tracings are dampened. Metatarsal pressure: was not obtained due to unreliable/severely dampened waveforms. Great toe pressure was not obtained due to unreliable/severely dampened waveforms (Prior 20 mmHg).  LEFT LOWER LIMB: Evidence of 50-75% stenosis noted at the tibioperoneal trunk/distal anastomosis of the distal superficial femoral- below knee popliteal bypass graft. Evidence of peroneal artery occlusive disease. Ankle/Brachial index: 0.79, which is in the moderate disease category (Prior: 0.48). PVR/ PPG tracings are dampened. Metatarsal pressure of 105 mmHg (Prior not obtained due to absent waverforms). Great toe pressure of 51 mmHg, which is borderline within the healing range (Prior not obtained due to absent waveforms).  Compared to previous study on 07/29/2024, there is significant improvement in the left FABIOLA/TBI. The right TBI is now absent with evidence of superficial femoral artery occlusive disease.  SIGNATURE: Electronically Signed by: ALYSSA ANG on 2024-12-10 08:38:55 PM       EKG / Monitor: Personally reviewed.    Sinus bradycardia with PVCs, heart rate 54/min        Dr. Naun Bell MD, [facility]      \"This note has been constructed using a voice recognition system.Therefore there may be syntax, spelling, and/or grammatical errors. Please call if you have any questions. \"  "

## 2024-12-15 NOTE — ASSESSMENT & PLAN NOTE
Wt Readings from Last 3 Encounters:   12/15/24 101 kg (223 lb)   12/11/24 107 kg (236 lb)   12/11/24 107 kg (236 lb)     Echocardiogram 9/24-EF 50%, grade 2 diastolic dysfunction, moderate to severe aortic stenosis,  Patient reports symptoms of shortness of breath at times and with activity.  proBNP 1067  Patient denies any worsening of leg edema or weight gain.  Noted to have increased shortness of breath and oxygen requirement, evening 12/12  Chest x-ray with evidence of interstitial edema, less likely pneumonia  Repeat 2D echo-EF 50%, abnormal diastolic function, elevated LA pressure.  Moderate to severe aortic stenosis.  No major changes from prior but noted to have significant PVCs  Volume overload likely precipitated by bradycardia and PVCs  Improved with IV Lasix  Continue IV Lasix 40 mg daily  Holding metoprolol currently secondary to bradycardia  Continue Jardiance  Monitor intake output, daily weight  Fluid and salt restriction  Follow-up cardiology recommendations

## 2024-12-15 NOTE — ASSESSMENT & PLAN NOTE
Lab Results   Component Value Date    HGBA1C 6.2 (H) 10/14/2024       Recent Labs     12/14/24  1046 12/14/24  1547 12/14/24  2007 12/15/24  0742   POCGLU 155* 131 170* 111       Blood Sugar Average: Last 72 hrs:  (P) [id number]  per primary team  patient follows with wound center for his diabetic ulcer and is undergoing hyperbaric oxygen treatments which seem to be successful

## 2024-12-15 NOTE — ASSESSMENT & PLAN NOTE
history of CABG times three in 2002 and PCI with drug-eluting stent to distal left circ in 2021  10/19/2022 [facility]: triple vessel disease with 100% occluded right coronary artery, 100% occluded mid LAD. SVG to RCA was also hundred percent occluded but RCA had collaterals from left circulation. [place] to LAD was widely patent and no flow was noted in the SVG to the circumflex  continue aspirin 81 mg once a day  continue Ranexa 1000 mg BID  Continue Imdur 30 mg daily  continue Norvasc 5 mg daily

## 2024-12-15 NOTE — PROGRESS NOTES
Progress Note - Hospitalist   Name: Thomas Horne 79 y.o. male I MRN: [id number]  Unit/Bed#: 4 Nursery 420-01 I Date of Admission: 12/12/2024   Date of Service: 12/15/2024 I Hospital Day: 2    Assessment & Plan  Bradycardia  Referred to ER from [facility] care center with concerns of bradycardia  EKG with normal sinus rhythm frequent PVCs in bigeminy  History of bradycardia, metoprolol was recently decreased by cardiology as outpatient  Concern about pseudo bradycardia in setting of PVCs versus true  Telemetry with improved heart rate after holding mars blocking agents but significant PVCs  Hemodynamically stable   Telemetry  Continue to hold metoprolol, clonidine.  Avoid mars blocking agents  Cardiology evaluation appreciated-plan for pacemaker placement 12/16/2024  Coronary artery disease involving native coronary artery of native heart without angina pectoris  History of CAD status post CABG in 2002 and status post PCI with NANDINI distal left circumflex 2021  Continue aspirin, isosorbide, Ranexa, statin  Acute on chronic HFpEF/Moderate pHTN (HFpEF) (HCC)  Wt Readings from Last 3 Encounters:   12/15/24 101 kg (223 lb)   12/11/24 107 kg (236 lb)   12/11/24 107 kg (236 lb)     Echocardiogram 9/24-EF 50%, grade 2 diastolic dysfunction, moderate to severe aortic stenosis,  Patient reports symptoms of shortness of breath at times and with activity.  proBNP 1067  Patient denies any worsening of leg edema or weight gain.  Noted to have increased shortness of breath and oxygen requirement, evening 12/12  Chest x-ray with evidence of interstitial edema, less likely pneumonia  Repeat 2D echo-EF 50%, abnormal diastolic function, elevated LA pressure.  Moderate to severe aortic stenosis.  No major changes from prior but noted to have significant PVCs  Volume overload likely precipitated by bradycardia and PVCs  Improved with IV Lasix  Continue IV Lasix 40 mg daily  Holding metoprolol currently secondary to bradycardia  Continue  Jardiance  Monitor intake output, daily weight  Fluid and salt restriction  Follow-up cardiology recommendations  Primary hypertension  Blood pressure stable  Continue amlodipine, isosorbide, torsemide  Holding metoprolol, clonidine in setting of bradycardia  Monitor blood pressure trend  Type 2 diabetes mellitus with diabetic polyneuropathy, with long-term current use of insulin ([facility], located within [facility])  Lab Results   Component Value Date    HGBA1C 6.2 (H) 10/14/2024       Recent Labs     12/14/24  1046 12/14/24  1547 12/14/24  2007 12/15/24  0742   POCGLU 155* 131 170* 111       Blood Sugar Average: Last 72 hrs:  (P) [id number]  Home regimen, Jardiance, Januvia, Lantus 15 units nightly  Continue Jardiance, Januvia  Continue glargine 15 units nightly  Insulin sliding scale  Diabetic diet  Monitor blood glucose trend  Stage 3b chronic kidney disease (HCC)  With baseline creatinine 1.6-1.8  Currently at baseline  Monitor  Peripheral artery disease (HCC)  Continue aspirin, clopidogrel, pentoxifylline  Multiple open wounds of lower extremity  In setting of peripheral artery disease and diabetes, currently undergoing HBO2 treatment  No evidence of current or history of infection associated with wounds  Mixed hyperlipidemia  Continue atorvastatin  S/P AAA repair    H/O prostate cancer  Status post prostatectomy  Iron deficiency anemia  History of iron deficiency anemia, suspected secondary to GI loss  Status post IV iron infusion  Now improved  History of DVT (deep vein thrombosis)  History of acute nonocclusive DVT of left gastrocnemius and peroneal vein, 4/24  Likely provoked in setting of preceding hospitalization  Patient was treated with Coumadin  D-dimer noted to be elevated likely nonspecific  Venous Doppler bilateral lower extremity-without any evidence of DVT  Continue DVT prophylaxis    VTE Pharmacologic Prophylaxis: VTE Score: 4 Moderate Risk (Score 3-4) - Pharmacological DVT Prophylaxis Ordered: enoxaparin  (Lovenox).    Mobility:   Basic Mobility Inpatient Raw Score: 21  JH-HLM Goal: 6: Walk 10 steps or more  JH-HLM Achieved: 7: Walk 25 feet or more  JH-HLM Goal achieved. Continue to encourage appropriate mobility.    Patient Centered Rounds: I performed bedside rounds with nursing staff today.   Discussions with Specialists or Other Care Team Provider: Cardiology    Education and Discussions with Family / Patient: Patient declined call to .     Current Length of Stay: 2 day(s)  Current Patient Status: Inpatient   Certification Statement: The patient, admitted on an observation basis, will now require > 2 midnight hospital stay due to arrhythmia, CHF  Discharge Plan: Anticipate discharge in 48-72 hrs to home.    Code Status: Level 3 - DNAR and DNI    Subjective   Sitting up comfortably in chair  Denies any chest pain shortness of breath or dizziness  Reports dry nose, mild epistaxis earlier      Objective :  Temp:  [97.7 °F (36.5 °C)-98.7 °F (37.1 °C)] 97.8 °F (36.6 °C)  HR:  [41-71] 54  BP: (134-147)/(58-73) 145/69  Resp:  [18-22] 20  SpO2:  [94 %-96 %] 94 %    Body mass index is 28.63 kg/m².     Input and Output Summary (last 24 hours):     Intake/Output Summary (Last 24 hours) at 12/15/2024 0835  Last data filed at 12/15/2024 0401  Gross per 24 hour   Intake 1200 ml   Output 2200 ml   Net -1000 ml       Physical Exam  Constitutional:       General: He is not in acute distress.  HENT:      Head: Normocephalic and atraumatic.      Nose:      Comments: No active bleeding  Cardiovascular:      Rate and Rhythm: Normal rate.   Pulmonary:      Effort: Pulmonary effort is normal. No respiratory distress.      Breath sounds: Decreased breath sounds present. No rhonchi.   Abdominal:      General: Bowel sounds are normal. There is no distension.      Palpations: Abdomen is soft.      Tenderness: There is no abdominal tenderness. There is no guarding or rebound.   Musculoskeletal:      Right lower leg: No edema.       Left lower leg: No edema.      Comments: Lower extremity dressing in place   Skin:     General: Skin is warm and dry.      Findings: No rash.   Neurological:      Mental Status: He is alert. Mental status is at baseline.      Cranial Nerves: No cranial nerve deficit.         Lines/Drains:        Telemetry:  Telemetry Orders (From admission, onward)               24 Hour Telemetry Monitoring  Continuous x 24 Hours (Telem)        Question:  Reason for 24 Hour Telemetry  Answer:  Arrhythmias requiring acute medical intervention / PPM or ICD malfunction                     Telemetry Reviewed: Normal Sinus Rhythm with frequent PVCs/bigeminy  Indication for Continued Telemetry Use: Arrthymias requiring medical therapy               Lab Results: I have reviewed the following results:   Results from last 7 days   Lab Units 12/15/24  0442 12/13/24 0447 12/12/24  1142   WBC Thousand/uL 8.39   < > 10.89*   HEMOGLOBIN g/dL 13.8   < > 12.9   HEMATOCRIT % 43.2   < > 41.3   PLATELETS Thousands/uL 223   < > 199   SEGS PCT %  --   --  79*   LYMPHO PCT %  --   --  13*   MONO PCT %  --   --  6   EOS PCT %  --   --  2    < > = values in this interval not displayed.     Results from last 7 days   Lab Units 12/15/24  0442 12/13/24 0447 12/12/24  1142   SODIUM mmol/L 140   < > 137   POTASSIUM mmol/L 3.7   < > 5.0   CHLORIDE mmol/L 103   < > 104   CO2 mmol/L 29   < > 29   BUN mg/dL 33*   < > 30*   CREATININE mg/dL 1.43*   < > 1.42*   ANION GAP mmol/L 8   < > 4   CALCIUM mg/dL 9.8   < > 9.3   ALBUMIN g/dL  --   --  4.0   TOTAL BILIRUBIN mg/dL  --   --  0.67   ALK PHOS U/L  --   --  37   ALT U/L  --   --  13   AST U/L  --   --  15   GLUCOSE RANDOM mg/dL 105   < > 142*    < > = values in this interval not displayed.     Results from last 7 days   Lab Units 12/12/24  1142   INR  1.08     Results from last 7 days   Lab Units 12/15/24  0742 12/14/24  2007 12/14/24  1547 12/14/24  1046 12/14/24  0721 12/13/24 2057 12/13/24  3393  12/13/24  1129 12/13/24  0757 12/12/24  2131 12/12/24  1738 12/12/24  1020   POC GLUCOSE mg/dl 111 170* 131 155* 104 145* 155* 178* 124 202* 196* 185*         Results from last 7 days   Lab Units 12/14/24  0527   PROCALCITONIN ng/ml 0.13  0.13       Recent Cultures (last 7 days):         Imaging Results Review: I reviewed radiology reports from this admission including: chest xray.  Other Study Results Review: No additional pertinent studies reviewed.    Last 24 Hours Medication List:     Current Facility-Administered Medications:     acetaminophen (TYLENOL) tablet 650 mg, Q6H PRN    amLODIPine (NORVASC) tablet 5 mg, Daily    aspirin chewable tablet 81 mg, Daily    atorvastatin (LIPITOR) tablet 40 mg, Daily    clopidogrel (PLAVIX) tablet 75 mg, Daily    DULoxetine (CYMBALTA) delayed release capsule 30 mg, Daily    Empagliflozin (JARDIANCE) tablet 10 mg, Daily    enoxaparin (LOVENOX) subcutaneous injection 40 mg, Daily    fenofibrate (TRICOR) tablet 145 mg, Daily    fish oil capsule 2,000 mg, BID    furosemide (LASIX) injection 40 mg, Daily    insulin glargine (LANTUS) subcutaneous injection 15 Units 0.15 mL, HS    insulin lispro (HumALOG/ADMELOG) 100 units/mL subcutaneous injection 1-5 Units, HS    insulin lispro (HumALOG/ADMELOG) 100 units/mL subcutaneous injection 1-6 Units, TID AC **AND** Fingerstick Glucose (POCT), TID AC    isosorbide mononitrate (IMDUR) 24 hr tablet 90 mg, Daily    multivitamin-minerals (CENTRUM) tablet 1 tablet, Daily    pantoprazole (PROTONIX) EC tablet 40 mg, Daily    pentoxifylline (TRENtal) ER tablet 400 mg, TID With Meals    pregabalin (LYRICA) capsule 150 mg, BID    ranolazine (RANEXA) 12 hr tablet 1,000 mg, BID    sitaGLIPtin (JANUVIA) tablet 25 mg, Daily    Administrative Statements   Today, Patient Was Seen By: Kristina Davalos MD  I have spent a total time of 45 minutes in caring for this patient on the day of the visit/encounter including Counseling / Coordination of care,  Documenting in the medical record, Reviewing / ordering tests, medicine, procedures  , Obtaining or reviewing history  , and Communicating with other healthcare professionals .    **Please Note: This note may have been constructed using a voice recognition system.**

## 2024-12-15 NOTE — ASSESSMENT & PLAN NOTE
Lab Results   Component Value Date    EGFR 45 12/15/2024    EGFR 46 12/14/2024    EGFR 44 12/13/2024    CREATININE 1.43 (H) 12/15/2024    CREATININE 1.40 (H) 12/14/2024    CREATININE 1.47 (H) 12/13/2024   continue to monitor

## 2024-12-15 NOTE — ASSESSMENT & PLAN NOTE
History of acute nonocclusive DVT of left gastrocnemius and peroneal vein, 4/24  Likely provoked in setting of preceding hospitalization  Patient was treated with Coumadin  D-dimer noted to be elevated likely nonspecific  Venous Doppler bilateral lower extremity-without any evidence of DVT  Continue DVT prophylaxis

## 2024-12-15 NOTE — ASSESSMENT & PLAN NOTE
Referred to ER from wound care center with concerns of bradycardia  EKG with normal sinus rhythm frequent PVCs in bigeminy  History of bradycardia, metoprolol was recently decreased by cardiology as outpatient  Concern about pseudo bradycardia in setting of PVCs versus true  Telemetry with improved heart rate after holding mars blocking agents but significant PVCs  Hemodynamically stable   Telemetry  Continue to hold metoprolol, clonidine.  Avoid mars blocking agents  Cardiology evaluation appreciated-plan for pacemaker placement 12/16/2024

## 2024-12-15 NOTE — UTILIZATION REVIEW
Continued Stay Review    Date: 12-14-24   Day 3: Has surpassed a 2nd midnight with active treatments and services for iv diuresis and pacemaker on 12-16                  Current Patient Class: Inpatient  Current Level of Care: med surg   Certification Statement: The patient will continue to require additional inpatient hospital stay due to pacemaker 12/16/2024  Discharge Plan: Anticipate discharge in 48 hrs to home.    HPI:79 y.o. male initially admitted on 12-12-24      Assessment/Plan:     Echo shows L ventricular ef 50%, global hypokinesis, diastolic abnormal consistent with ventricular excitation. Systolic function is low normal.  Venous duplex wnl.  Telemetry sinus rhythm with PVCs at 66 /min.  Continue iv lasix 40 mg daily.  Plan for pacemaker on 12-16.        Scheduled Medications:  amLODIPine, 5 mg, Oral, Daily  aspirin, 81 mg, Oral, Daily  atorvastatin, 40 mg, Oral, Daily  clopidogrel, 75 mg, Oral, Daily  DULoxetine, 30 mg, Oral, Daily  Empagliflozin, 10 mg, Oral, Daily  enoxaparin, 40 mg, Subcutaneous, Daily  fenofibrate, 145 mg, Oral, Daily  fish oil, 2,000 mg, Oral, BID  furosemide, 40 mg, Intravenous, Daily  insulin glargine, 15 Units, Subcutaneous, HS  insulin lispro, 1-5 Units, Subcutaneous, HS  insulin lispro, 1-6 Units, Subcutaneous, TID AC  isosorbide mononitrate, 90 mg, Oral, Daily  multivitamin-minerals, 1 tablet, Oral, Daily  pantoprazole, 40 mg, Oral, Daily  pentoxifylline, 400 mg, Oral, TID With Meals  pregabalin, 150 mg, Oral, BID  ranolazine, 1,000 mg, Oral, BID  sitaGLIPtin, 25 mg, Oral, Daily      Continuous IV Infusions:     PRN Meds:  acetaminophen, 650 mg, Oral, Q6H PRN  sodium chloride, 1 spray, Each Nare, Q1H PRN      Discharge Plan: to be determined     Vital Signs (last 3 days)       Date/Time Temp Pulse Resp BP MAP  SpO2 O2 Device Cara Coma Scale Score Pain    12/15/24 0800 -- -- -- -- -- -- -- 15 No Pain    12/15/24 07:46:11 97.8 °F (36.6 °C) 54 20 145/69 94 94 % None (Room  air) -- --    12/15/24 02:26:23 98.5 °F (36.9 °C) 41 20 138/58 85 94 % -- -- --    12/14/24 23:06:37 98.7 °F (37.1 °C) 48 19 142/63 89 95 % -- -- --    12/14/24 2100 -- -- -- -- -- -- -- -- No Pain    12/14/24 18:12:27 97.7 °F (36.5 °C) 71 18 147/73 98 95 % -- -- --    12/14/24 15:27:23 97.8 °F (36.6 °C) 70 22 134/69 91 96 % -- -- --    12/14/24 0800 -- -- -- -- -- -- -- 15 No Pain    12/14/24 07:51:39 97.9 °F (36.6 °C) 59 18 154/70 98 94 % -- -- --    12/14/24 03:10:43 97.9 °F (36.6 °C) 46 18 151/74 100 98 % -- -- --    12/13/24 23:05:21 98 °F (36.7 °C) 50 19 144/62 89 96 % -- -- --    12/13/24 2000 -- -- -- -- -- -- -- -- No Pain    12/13/24 16:58:02 97.9 °F (36.6 °C) 64 16 143/66 92 97 % -- -- --    12/13/24 0900 -- -- -- -- -- -- -- 15 No Pain    12/13/24 08:44:44 -- 58 -- 143/51 82 98 % -- -- --    12/13/24 0805 -- 43 -- 128/59 -- 95 % -- -- --    12/13/24 02:39:51 -- 61 -- 128/59 82 94 % -- -- --    12/13/24 02:39:27 -- 64 -- 128/59 82 95 % -- -- --          Weight (last 2 days)       Date/Time Weight    12/15/24 0600 101 (223)    12/14/24 0600 104 (229.28)    12/13/24 0805 104 (229.28)    12/13/24 0640 104 (229)            Pertinent Labs/Diagnostic Results:   Radiology:   VAS VENOUS DUPLEX - LOWER LIMB BILATERAL   Final  (12/13 1002)   RIGHT LOWER LIMB:  No evidence of acute or chronic deep vein thrombosis.  No evidence of superficial thrombophlebitis noted.  Doppler evaluation shows a normal response to augmentation maneuvers..  Popliteal, posterior tibial and anterior tibial arterial Doppler waveforms are  monophasic (Hx BPG).     LEFT LOWER LIMB:  No evidence of acute or chronic deep vein thrombosis.  No evidence of superficial thrombophlebitis noted.  Deep vein reflux noted in the popliteal and peroneal veins.  Doppler evaluation shows a normal response to augmentation maneuvers.  Popliteal, posterior tibial and anterior tibial arterial Doppler waveforms are  monophasic (Hx BPG).   XR chest portable    Final (12/13 0821)      Hypoventilation with increased interstitial opacities suggesting interstitial edema. Atypical pneumonia is less likely. This finding is new.        Cardiology:    12-13-24  ECHO     Left Ventricle: Left ventricular cavity size is upper normal. Wall thickness is mildly increased. The left ventricular ejection fraction is 50% by biplane measurement. Systolic function is mildly reduced. There is mild global hypokinesis with regional variation. Diastolic function is abnormal.  Left atrial filling pressure is elevated.    IVS: There is abnormal septal motion consistent with ventricular pre-excitation.    Right Ventricle: Right ventricular cavity size is mildly dilated. Systolic function is low normal. Normal tricuspid annular plane systolic excursion (TAPSE) > 1.7 cm.    Left Atrium: The atrium is moderately dilated (42-48 mL/m2).    Right Atrium: The atrium is mildly dilated.    Aortic Valve: The leaflets are moderately thickened. The leaflets are moderately calcified. There is severely reduced mobility. There is moderate to severe stenosis. The peak aortic velocity was measured in the suprasternal view. The aortic valve peak velocity is 3.46 m/s. The aortic valve peak gradient is 48 mmHg. The aortic valve mean gradient is 29 mmHg. The dimensionless velocity index is 0.26. The aortic valve area is 1.05 cm2. The stroke volume index is 36.50 ml/m2.    Mitral Valve: There is mild regurgitation.    Tricuspid Valve: The right ventricular systolic pressure is mildly to moderately elevated.    Prior TTE study available for comparison. Prior study date: 9/7/2024. No major change in EF or severity of aortic valve. Frequent PVC are noted     GI:  No orders to display           Results from last 7 days   Lab Units 12/15/24  0442 12/14/24  0527 12/13/24  0447 12/12/24  1142   WBC Thousand/uL 8.39 8.16 10.87* 10.89*   HEMOGLOBIN g/dL 13.8 12.5 11.9* 12.9   HEMATOCRIT % 43.2 39.5 38.1 41.3   PLATELETS  "Thousands/uL 223 161 166 199   TOTAL NEUT ABS Thousands/µL  --   --   --  8.57*         Results from last 7 days   Lab Units 12/15/24  0442 12/14/24  0527 12/13/24  0447 12/12/24  1142   SODIUM mmol/L 140 137 138 137   POTASSIUM mmol/L 3.7 3.9 3.9 5.0   CHLORIDE mmol/L 103 104 105 104   CO2 mmol/L 29 24 29 29   ANION GAP mmol/L 8 9 4 4   BUN mg/dL 33* 29* 28* 30*   CREATININE mg/dL 1.43* 1.40* 1.47* 1.42*   EGFR ml/min/1.73sq m 45 46 44 45   CALCIUM mg/dL 9.8 9.0 8.8 9.3   MAGNESIUM mg/dL  --  2.0 2.1 2.2     Results from last 7 days   Lab Units 12/12/24  1142   AST U/L 15   ALT U/L 13   ALK PHOS U/L 37   TOTAL PROTEIN g/dL 7.4   ALBUMIN g/dL 4.0   TOTAL BILIRUBIN mg/dL 0.67     Results from last 7 days   Lab Units 12/15/24  1609 12/15/24  1131 12/15/24  0742 12/14/24  2007 12/14/24  1547 12/14/24  1046 12/14/24  0721 12/13/24  2057 12/13/24  1653 12/13/24  1129 12/13/24  0757 12/12/24  2131   POC GLUCOSE mg/dl 186* 210* 111 170* 131 155* 104 145* 155* 178* 124 202*     Results from last 7 days   Lab Units 12/15/24  0442 12/14/24  0527 12/13/24  0447 12/12/24  1142   GLUCOSE RANDOM mg/dL 105 102 122 142*             No results found for: \"BETA-HYDROXYBUTYRATE\"                   Results from last 7 days   Lab Units 12/12/24  1351 12/12/24  1142   HS TNI 0HR ng/L  --  35   HS TNI 2HR ng/L 33  --    HSTNI D2 ng/L -2  --      Results from last 7 days   Lab Units 12/12/24  2056   D-DIMER QUANTITATIVE ug/ml FEU 1.74*     Results from last 7 days   Lab Units 12/12/24  1142   PROTIME seconds 14.6   INR  1.08   PTT seconds 35*     Results from last 7 days   Lab Units 12/12/24  1142   TSH 3RD GENERATON uIU/mL 1.890     Results from last 7 days   Lab Units 12/14/24  0527   PROCALCITONIN ng/ml 0.13  0.13                 Results from last 7 days   Lab Units 12/12/24  1142   BNP pg/mL 1,067*                                     Results from last 7 days   Lab Units 12/12/24  1656   CLARITY UA  Clear   COLOR UA  Yellow   SPEC GRAV " UA  1.020   PH UA  5.5   GLUCOSE UA mg/dl 300 (3/10%)*   KETONES UA mg/dl Negative   BLOOD UA  Negative   PROTEIN UA mg/dl Negative   NITRITE UA  Negative   BILIRUBIN UA  Negative   UROBILINOGEN UA (BE) mg/dl <2.0   LEUKOCYTES UA  Negative       Network Utilization Review Department  ATTENTION: Please call with any questions or concerns to [phone] and carefully listen to the prompts so that you are directed to the right person. All voicemails are confidential.   For Discharge needs, contact Care Management DC Support Team at [phone] opt. [phone]  Send all requests for admission clinical reviews, approved or denied determinations and any other requests to dedicated fax number below belonging to the campus where the patient is receiving treatment. List of dedicated fax numbers for the Facilities:  FACILITY NAME UR FAX NUMBER   ADMISSION DENIALS (Administrative/Medical Necessity) [phone]   DISCHARGE SUPPORT TEAM (NETWORK) [phone]   PARENT CHILD HEALTH (Maternity/NICU/Pediatrics) [phone]   [facility] [phone]   [facility] [phone]   [facility] [phone]   [facility] [phone]   [facility] [phone]   [facility] [phone]   [facility] [phone]   [facility] [phone]   [facility] [phone]   [facility] [phone]   [facility] [phone]   [facility] [phone]

## 2024-12-15 NOTE — ASSESSMENT & PLAN NOTE
patient sent from [facility] center due to bradycardia with pulse ox heart rate of 20 to 30  patient noted to have frequent PVCs in bigemy, and most likely pulse ox was not detecting the PVCs as they are not perfused in the periphery.  Will hold patient's clonidine and beta-blocker at this time and continue to monitor  Permanent pacemaker scheduled for 12/16/2024

## 2024-12-15 NOTE — ASSESSMENT & PLAN NOTE
Lab Results   Component Value Date    EGFR 45 12/15/2024    EGFR 46 12/14/2024    EGFR 44 12/13/2024    CREATININE 1.43 (H) 12/15/2024    CREATININE 1.40 (H) 12/14/2024    CREATININE 1.47 (H) 12/13/2024

## 2024-12-15 NOTE — ASSESSMENT & PLAN NOTE
Wt Readings from Last 3 Encounters:   12/15/24 101 kg (223 lb)   12/11/24 107 kg (236 lb)   12/11/24 107 kg (236 lb)

## 2024-12-15 NOTE — ASSESSMENT & PLAN NOTE
Lab Results   Component Value Date    HGBA1C 6.2 (H) 10/14/2024       Recent Labs     12/14/24  1046 12/14/24  1547 12/14/24  2007 12/15/24  0742   POCGLU 155* 131 170* 111       Blood Sugar Average: Last 72 hrs:  (P) [id number]  Home regimen, Jardiance, Januvia, Lantus 15 units nightly  Continue Jardiance, Januvia  Continue glargine 15 units nightly  Insulin sliding scale  Diabetic diet  Monitor blood glucose trend

## 2024-12-15 NOTE — ASSESSMENT & PLAN NOTE
Lab Results   Component Value Date    HGBA1C 6.2 (H) 10/14/2024       Recent Labs     12/14/24  1046 12/14/24  1547 12/14/24  2007 12/15/24  0742   POCGLU 155* 131 170* 111       managed per primary team

## 2024-12-16 ENCOUNTER — APPOINTMENT (INPATIENT)
Dept: RADIOLOGY | Facility: HOSPITAL | Age: 79
DRG: 242 | End: 2024-12-16
Payer: COMMERCIAL

## 2024-12-16 LAB
ANION GAP SERPL CALCULATED.3IONS-SCNC: 8 MMOL/L (ref 4–13)
BUN SERPL-MCNC: 31 MG/DL (ref 5–25)
CALCIUM SERPL-MCNC: 9.1 MG/DL (ref 8.4–10.2)
CHLORIDE SERPL-SCNC: 106 MMOL/L (ref 96–108)
CO2 SERPL-SCNC: 22 MMOL/L (ref 21–32)
CREAT SERPL-MCNC: 1.35 MG/DL (ref 0.6–1.3)
ERYTHROCYTE [DISTWIDTH] IN BLOOD BY AUTOMATED COUNT: 14.1 % (ref 11.6–15.1)
GFR SERPL CREATININE-BSD FRML MDRD: 48 ML/MIN/1.73SQ M
GLUCOSE SERPL-MCNC: 125 MG/DL (ref 65–140)
GLUCOSE SERPL-MCNC: 146 MG/DL (ref 65–140)
GLUCOSE SERPL-MCNC: 167 MG/DL (ref 65–140)
GLUCOSE SERPL-MCNC: 207 MG/DL (ref 65–140)
GLUCOSE SERPL-MCNC: 253 MG/DL (ref 65–140)
HCT VFR BLD AUTO: 47 % (ref 36.5–49.3)
HGB BLD-MCNC: 14.5 G/DL (ref 12–17)
MAGNESIUM SERPL-MCNC: 2.1 MG/DL (ref 1.9–2.7)
MCH RBC QN AUTO: 27.2 PG (ref 26.8–34.3)
MCHC RBC AUTO-ENTMCNC: 30.9 G/DL (ref 31.4–37.4)
MCV RBC AUTO: 88 FL (ref 82–98)
PLATELET # BLD AUTO: 207 THOUSANDS/UL (ref 149–390)
PMV BLD AUTO: 11.2 FL (ref 8.9–12.7)
POTASSIUM SERPL-SCNC: 4.2 MMOL/L (ref 3.5–5.3)
RBC # BLD AUTO: 5.34 MILLION/UL (ref 3.88–5.62)
SODIUM SERPL-SCNC: 136 MMOL/L (ref 135–147)
WBC # BLD AUTO: 7.17 THOUSAND/UL (ref 4.31–10.16)

## 2024-12-16 PROCEDURE — 71045 X-RAY EXAM CHEST 1 VIEW: CPT

## 2024-12-16 PROCEDURE — 83735 ASSAY OF MAGNESIUM: CPT | Performed by: INTERNAL MEDICINE

## 2024-12-16 PROCEDURE — 3E0102A INTRODUCTION OF ANTI-INFECTIVE ENVELOPE INTO SUBCUTANEOUS TISSUE, OPEN APPROACH: ICD-10-PCS | Performed by: INTERNAL MEDICINE

## 2024-12-16 PROCEDURE — 0JH606Z INSERTION OF PACEMAKER, DUAL CHAMBER INTO CHEST SUBCUTANEOUS TISSUE AND FASCIA, OPEN APPROACH: ICD-10-PCS | Performed by: INTERNAL MEDICINE

## 2024-12-16 PROCEDURE — 99024 POSTOP FOLLOW-UP VISIT: CPT | Performed by: INTERNAL MEDICINE

## 2024-12-16 PROCEDURE — 80048 BASIC METABOLIC PNL TOTAL CA: CPT | Performed by: INTERNAL MEDICINE

## 2024-12-16 PROCEDURE — C1898 LEAD, PMKR, OTHER THAN TRANS: HCPCS | Performed by: INTERNAL MEDICINE

## 2024-12-16 PROCEDURE — 99152 MOD SED SAME PHYS/QHP 5/>YRS: CPT | Performed by: INTERNAL MEDICINE

## 2024-12-16 PROCEDURE — 33208 INSRT HEART PM ATRIAL & VENT: CPT | Performed by: INTERNAL MEDICINE

## 2024-12-16 PROCEDURE — 85027 COMPLETE CBC AUTOMATED: CPT | Performed by: INTERNAL MEDICINE

## 2024-12-16 PROCEDURE — C1887 CATHETER, GUIDING: HCPCS | Performed by: INTERNAL MEDICINE

## 2024-12-16 PROCEDURE — 02H63JZ INSERTION OF PACEMAKER LEAD INTO RIGHT ATRIUM, PERCUTANEOUS APPROACH: ICD-10-PCS | Performed by: INTERNAL MEDICINE

## 2024-12-16 PROCEDURE — 02HK3JZ INSERTION OF PACEMAKER LEAD INTO RIGHT VENTRICLE, PERCUTANEOUS APPROACH: ICD-10-PCS | Performed by: INTERNAL MEDICINE

## 2024-12-16 PROCEDURE — 99153 MOD SED SAME PHYS/QHP EA: CPT | Performed by: INTERNAL MEDICINE

## 2024-12-16 PROCEDURE — 82948 REAGENT STRIP/BLOOD GLUCOSE: CPT

## 2024-12-16 PROCEDURE — C1785 PMKR, DUAL, RATE-RESP: HCPCS | Performed by: INTERNAL MEDICINE

## 2024-12-16 PROCEDURE — NC001 PR NO CHARGE: Performed by: INTERNAL MEDICINE

## 2024-12-16 PROCEDURE — 99232 SBSQ HOSP IP/OBS MODERATE 35: CPT | Performed by: INTERNAL MEDICINE

## 2024-12-16 PROCEDURE — C1769 GUIDE WIRE: HCPCS | Performed by: INTERNAL MEDICINE

## 2024-12-16 PROCEDURE — C1894 INTRO/SHEATH, NON-LASER: HCPCS | Performed by: INTERNAL MEDICINE

## 2024-12-16 DEVICE — ENVELOPE CMRM6122 ABSORB MED MR
Type: IMPLANTABLE DEVICE | Site: CHEST  WALL | Status: FUNCTIONAL
Brand: TYRX™

## 2024-12-16 DEVICE — LEAD 3830 US MKT/ 69CM MRI LBBAP
Type: IMPLANTABLE DEVICE | Site: CHEST  WALL | Status: FUNCTIONAL
Brand: SELECTSECURE™ MRI SURESCAN™

## 2024-12-16 DEVICE — LEAD 5076-52 MRI US RCMCRD
Type: IMPLANTABLE DEVICE | Site: CHEST  WALL | Status: FUNCTIONAL
Brand: CAPSUREFIX NOVUS MRI™ SURESCAN®

## 2024-12-16 DEVICE — IPG W1DR01 AZURE XT DR MRI USA
Type: IMPLANTABLE DEVICE | Site: CHEST  WALL | Status: FUNCTIONAL
Brand: AZURE™ XT DR MRI SURESCAN™

## 2024-12-16 RX ORDER — GENTAMICIN 40 MG/ML
INJECTION, SOLUTION INTRAMUSCULAR; INTRAVENOUS CODE/TRAUMA/SEDATION MEDICATION
Status: DISCONTINUED | OUTPATIENT
Start: 2024-12-16 | End: 2024-12-16 | Stop reason: HOSPADM

## 2024-12-16 RX ORDER — MIDAZOLAM HYDROCHLORIDE 2 MG/2ML
INJECTION, SOLUTION INTRAMUSCULAR; INTRAVENOUS CODE/TRAUMA/SEDATION MEDICATION
Status: DISCONTINUED | OUTPATIENT
Start: 2024-12-16 | End: 2024-12-16 | Stop reason: HOSPADM

## 2024-12-16 RX ORDER — CEFAZOLIN SODIUM 2 G/50ML
2000 SOLUTION INTRAVENOUS EVERY 8 HOURS
Status: COMPLETED | OUTPATIENT
Start: 2024-12-16 | End: 2024-12-17

## 2024-12-16 RX ORDER — LOSARTAN POTASSIUM 25 MG/1
25 TABLET ORAL DAILY
Status: DISCONTINUED | OUTPATIENT
Start: 2024-12-16 | End: 2024-12-17 | Stop reason: HOSPADM

## 2024-12-16 RX ORDER — AMLODIPINE BESYLATE 2.5 MG/1
2.5 TABLET ORAL DAILY
Status: DISCONTINUED | OUTPATIENT
Start: 2024-12-17 | End: 2024-12-17 | Stop reason: HOSPADM

## 2024-12-16 RX ORDER — FUROSEMIDE 10 MG/ML
40 INJECTION INTRAMUSCULAR; INTRAVENOUS ONCE
Status: COMPLETED | OUTPATIENT
Start: 2024-12-16 | End: 2024-12-16

## 2024-12-16 RX ORDER — FENTANYL CITRATE 50 UG/ML
INJECTION, SOLUTION INTRAMUSCULAR; INTRAVENOUS CODE/TRAUMA/SEDATION MEDICATION
Status: DISCONTINUED | OUTPATIENT
Start: 2024-12-16 | End: 2024-12-16 | Stop reason: HOSPADM

## 2024-12-16 RX ORDER — TORSEMIDE 20 MG/1
20 TABLET ORAL DAILY
Status: DISCONTINUED | OUTPATIENT
Start: 2024-12-17 | End: 2024-12-17 | Stop reason: HOSPADM

## 2024-12-16 RX ORDER — CEFAZOLIN SODIUM 2 G/50ML
SOLUTION INTRAVENOUS
Status: COMPLETED | OUTPATIENT
Start: 2024-12-16 | End: 2024-12-16

## 2024-12-16 RX ORDER — METOPROLOL SUCCINATE 100 MG/1
100 TABLET, EXTENDED RELEASE ORAL DAILY
Status: DISCONTINUED | OUTPATIENT
Start: 2024-12-16 | End: 2024-12-17 | Stop reason: HOSPADM

## 2024-12-16 RX ADMIN — CLOPIDOGREL 75 MG: 75 TABLET ORAL at 10:28

## 2024-12-16 RX ADMIN — PREGABALIN 150 MG: 75 CAPSULE ORAL at 10:24

## 2024-12-16 RX ADMIN — ATORVASTATIN CALCIUM 40 MG: 40 TABLET, FILM COATED ORAL at 10:23

## 2024-12-16 RX ADMIN — EMPAGLIFLOZIN 10 MG: 10 TABLET, FILM COATED ORAL at 10:31

## 2024-12-16 RX ADMIN — OMEGA-3 FATTY ACIDS CAP 1000 MG 2000 MG: 1000 CAP at 17:00

## 2024-12-16 RX ADMIN — ENOXAPARIN SODIUM 40 MG: 40 INJECTION SUBCUTANEOUS at 10:23

## 2024-12-16 RX ADMIN — PENTOXIFYLLINE 400 MG: 400 TABLET, EXTENDED RELEASE ORAL at 10:28

## 2024-12-16 RX ADMIN — RANOLAZINE 1000 MG: 500 TABLET, FILM COATED, EXTENDED RELEASE ORAL at 10:31

## 2024-12-16 RX ADMIN — INSULIN LISPRO 3 UNITS: 100 INJECTION, SOLUTION INTRAVENOUS; SUBCUTANEOUS at 16:59

## 2024-12-16 RX ADMIN — INSULIN GLARGINE 15 UNITS: 100 INJECTION, SOLUTION SUBCUTANEOUS at 23:05

## 2024-12-16 RX ADMIN — OMEGA-3 FATTY ACIDS CAP 1000 MG 2000 MG: 1000 CAP at 10:29

## 2024-12-16 RX ADMIN — INSULIN LISPRO 2 UNITS: 100 INJECTION, SOLUTION INTRAVENOUS; SUBCUTANEOUS at 12:49

## 2024-12-16 RX ADMIN — PENTOXIFYLLINE 400 MG: 400 TABLET, EXTENDED RELEASE ORAL at 13:00

## 2024-12-16 RX ADMIN — FENOFIBRATE 145 MG: 145 TABLET, FILM COATED ORAL at 10:30

## 2024-12-16 RX ADMIN — PREGABALIN 150 MG: 75 CAPSULE ORAL at 17:00

## 2024-12-16 RX ADMIN — DULOXETINE HYDROCHLORIDE 30 MG: 30 CAPSULE, DELAYED RELEASE ORAL at 10:29

## 2024-12-16 RX ADMIN — LOSARTAN POTASSIUM 25 MG: 25 TABLET, FILM COATED ORAL at 10:32

## 2024-12-16 RX ADMIN — CEFAZOLIN SODIUM 2000 MG: 2 SOLUTION INTRAVENOUS at 14:57

## 2024-12-16 RX ADMIN — CEFAZOLIN SODIUM 2000 MG: 2 SOLUTION INTRAVENOUS at 23:05

## 2024-12-16 RX ADMIN — FUROSEMIDE 40 MG: 10 INJECTION, SOLUTION INTRAMUSCULAR; INTRAVENOUS at 14:55

## 2024-12-16 RX ADMIN — METOPROLOL SUCCINATE 100 MG: 100 TABLET, EXTENDED RELEASE ORAL at 10:32

## 2024-12-16 RX ADMIN — RANOLAZINE 1000 MG: 500 TABLET, FILM COATED, EXTENDED RELEASE ORAL at 17:00

## 2024-12-16 RX ADMIN — INSULIN LISPRO 1 UNITS: 100 INJECTION, SOLUTION INTRAVENOUS; SUBCUTANEOUS at 23:05

## 2024-12-16 RX ADMIN — ASPIRIN 81 MG CHEWABLE TABLET 81 MG: 81 TABLET CHEWABLE at 10:30

## 2024-12-16 RX ADMIN — PANTOPRAZOLE SODIUM 40 MG: 40 TABLET, DELAYED RELEASE ORAL at 10:30

## 2024-12-16 RX ADMIN — Medication 1 TABLET: at 10:29

## 2024-12-16 RX ADMIN — PENTOXIFYLLINE 400 MG: 400 TABLET, EXTENDED RELEASE ORAL at 17:00

## 2024-12-16 RX ADMIN — SITAGLIPTIN 25 MG: 25 TABLET, FILM COATED ORAL at 10:30

## 2024-12-16 RX ADMIN — ISOSORBIDE MONONITRATE 90 MG: 60 TABLET, EXTENDED RELEASE ORAL at 10:28

## 2024-12-16 NOTE — DISCHARGE INSTRUCTIONS
Pacemaker   WHAT YOU NEED TO KNOW:   A pacemaker is a small device placed in your chest. It helps control your heartbeat. You may need a pacemaker if your heartbeat is too slow, too fast, or irregular. A pacemaker is about the size of a large wristwatch. It is made up of flexible wires (leads) with sensors, a battery, pulse generator, and a small computer. The sensors measure your heartbeat. They send this information to the computer. The computer causes the generator to send electrical impulses to your heart. This makes your heart beat correctly. Some pacemakers can also record your heart rate and rhythm.   DISCHARGE INSTRUCTIONS:   Call 911 for any of the following:   You have any of the following signs of a heart attack:      Squeezing, pressure, or pain in your chest     You may also have any of the following:      Discomfort or pain in your back, neck, jaw, stomach, or arm     Shortness of breath     Nausea or vomiting     Lightheadedness or a sudden cold sweat     You feel lightheaded, short of breath, and have chest pain.     You cough up blood.     Seek care immediately if:   Your arm or leg feels warm, tender, and painful. It may look swollen and red.     You feel weak, dizzy, or faint.     Your stitches come apart.     Your pulse is lower or higher than your healthcare provider said it should be.     Contact your healthcare provider if:   You have a fever or chills.     Your wound is red, swollen, or draining pus.     You have questions or concerns about your condition or care.     Care for your incision as directed: Ask your healthcare provider when you can remove your bandage. Wash around your incision with soap and water. It is okay to let soap and water run over your incision. Do not scrub your incision. Gently pat the area dry, and apply new, clean bandages as directed. Check your incision every day for redness, swelling, or pus.   Activity:   Do not lift anything heavier than 3 pounds with the arm  closest to your pacemaker. Do not lift the arm over your head until your healthcare provider says it is okay. Ask your healthcare provider how long to follow these instructions.      Do not do vigorous activities. This includes contact sports and some types of exercise. These activities can damage your pacemaker or cause your wires to move. Ask your healthcare provider what activities are safe for you to do.     Pacemaker safety:   Tell all healthcare providers that you have a pacemaker. MRI machines and certain equipment used during surgery can affect how your pacemaker works.      Limit or avoid close contact with certain electrical devices. Examples include cell phones, iPods™, microwave ovens, and generators. These devices can prevent your pacemaker from working correctly. Stand at least 2 feet from a generator. Do not put your cell phone or iPod in the chest pocket closest to your pacemaker. Use the arm opposite your pacemaker to hold and use your cell phone.      Tell airport security that you have a pacemaker before you go through the metal detectors. Metal detectors may beep because of the metal in your pacemaker. Step away from the machine if you feel dizzy or your heart rate increases. Ask the security agents not to hold a security wand over your pacemaker for more than a few seconds. Your pacemaker function or programming may be affected by the wand.      Wear medical alert identification. Wear medical alert jewelry or carry a card that says you have a pacemaker. Ask your healthcare provider where to get these items.          Check your pulse as directed. Check for 1 minute while you are resting. Write down your heart rate. Bring a copy of these numbers to your follow-up visits.     What you need to know about care of your pacemaker:   Your healthcare provider will check your pacemaker about every 3 months. Some checks may be done over the telephone. He or she will make sure it is working correctly. You  may also need regular EKGs to check the electrical activity of your heart.      Your pacemaker generator, battery, and leads will need to be replaced. The battery may need to be replaced in 6 to 7 years or longer. The generator will be replaced at the same time. The battery and generator are replaced during surgery. Your leads will need to be replaced if they cause an infection or move out of place. Your healthcare provider will monitor you and decide when these parts need to be replaced.     Follow up with your healthcare provider as directed: Write down your questions so you remember to ask them during your visits.  © Copyright [employer] 2020 Information is for End User's use only and may not be sold, redistributed or otherwise used for commercial purposes. All illustrations and images included in CareNotes® are the copyrighted property of [employer] or [employer]  The above information is an  only. It is not intended as medical advice for individual conditions or treatments. Talk to your doctor, nurse or pharmacist before following any medical regimen to see if it is safe and effective for you.

## 2024-12-16 NOTE — ASSESSMENT & PLAN NOTE
In setting of peripheral artery disease and diabetes, currently undergoing HBO2 treatment  No evidence of current or history of infection associated with wounds  Patient will have to hold HBO2 treatment after pacemaker placement until incision is healed and cleared by cardiology

## 2024-12-16 NOTE — ASSESSMENT & PLAN NOTE
Lab Results   Component Value Date    HGBA1C 6.2 (H) 10/14/2024       Recent Labs     12/15/24  1609 12/15/24  2005 12/16/24  0710 12/16/24  1113   POCGLU 186* 202* 125 207*       Blood Sugar Average: Last 72 hrs:  (P) [id number]  Home regimen, Jardiance, Januvia, Lantus 15 units nightly  Continue Jardiance, currently on 10 mg, patient is on 25 mg at home  Continue Januvia  Continue glargine 15 units nightly  Insulin sliding scale  Diabetic diet  Monitor blood glucose trend

## 2024-12-16 NOTE — UTILIZATION REVIEW
Initial Clinical Review    Observation 12/12/24 @ 1319 converted to inpatient admission 12/13/24 @ 1542 for continued care & tx for bradycardia.     Admission: Date/Time/Statement:   Admission Orders (From admission, onward)       Ordered        12/13/24 1542  INPATIENT ADMISSION  Once            12/12/24 1319  Place in Observation  Once                          Orders Placed This Encounter   Procedures    INPATIENT ADMISSION     Standing Status:   Standing     Number of Occurrences:   1     Level of Care:   Med Surg [16]     Estimated length of stay:   More than 2 Midnights     Certification:   I certify that inpatient services are medically necessary for this patient for a duration of greater than two midnights. See H&P and MD Progress Notes for additional information about the patient's course of treatment.     ED Arrival Information       Expected   -    Arrival   12/12/2024 11:11    Acuity   Emergent              Means of arrival   Wheelchair    Escorted by   Self    Service   Hospitalist    Admission type   Emergency              Arrival complaint   low heart rate sent in by              Chief Complaint   Patient presents with    Slow Heart Rate     States he was at wound care in hyperbaric chamber and is HR was as low as 28 and states sent to ED. States he gets dizzy when he closes his eyes. No chest pain. EKG in progress       Initial Presentation:   79 yom to ER from wound care center for HR in 20's, lightheaded while in hyperbaric chamber. Hx insulin-dependent diabetes, bilateral chronic wound of lower extremity, CHF, MI, CAD, CKD, SUSAN not tolerating CPAP at home, diabetic neuropathy. Presents with generalized weakness, bradycardic with HR in 20's. Admission EKG: SB, 1st degree AVB, PVC's in pattern of bigeminy. Labs: WBC 10.89, BUN 30, Cr 1.42, d-dimer 1.74, PTT 35, BNP 1067, u/a+gluc.  Placed in observation status for bradycardia. Plan: telemetry, cardio consulted, accuchecks.  Per cardio:  bradycardia  Hold beta blocker, monitor telemetry. May need pacemaker.     Observation to IP admission 12/13/24:  Bradycardia POA, mgt per cardio.   Per cardio: overnight 12/12 patient complained of sudden onset of shortness of breath and wheezing, chest x-ray with hypoventilation increasing residual opacities suggestive of interstitial edema. Patient was given IV Lasix with improvement of symptoms. overnight 12/12 patient complained of sudden onset of shortness of breath and wheezing, chest x-ray with hypoventilation increasing residual opacities suggestive of interstitial edema. Patient was given IV Lasix with improvement of symptoms. Volume overload most likely due to frequent PVCs and bradycardia. Continue IV Lasix. Clonidine and beta-blocker on hold.       ED Treatment-Medication Administration from 12/12/2024 1111 to 12/12/2024 1521         Date/Time Order Dose Route Action     12/12/2024 1142 magnesium sulfate 2 g/50 mL IVPB (premix) 2 g 2 g Intravenous New Bag     12/12/2024 1218 sodium chloride 0.9 % bolus 500 mL 500 mL Intravenous New Bag            Scheduled Medications:  Medications 12/04 12/05 12/06 12/07 12/08 12/09 12/10 12/11 12/12 12/13   amLODIPine (NORVASC) tablet 5 mg  Dose: 5 mg  Freq: Daily Route: PO  Start: 12/13/24 0900   Admin Instructions:      Order specific questions:                0846        aspirin chewable tablet 81 mg  Dose: 81 mg  Freq: Daily Route: PO  Start: 12/13/24 0900             0846        atorvastatin (LIPITOR) tablet 40 mg  Dose: 40 mg  Freq: Daily Route: PO  Start: 12/13/24 0900             0846        atropine 1 mg/10 mL injection 1 mg  Dose: 1 mg  Freq: Once Route: IV  Start: 12/12/24 1130 End: 12/12/24 1904   Admin Instructions:               (4928) [C]     1904-D/C'd       clopidogrel (PLAVIX) tablet 75 mg  Dose: 75 mg  Freq: Daily Route: PO  Start: 12/13/24 0900   Admin Instructions:                0846        DULoxetine (CYMBALTA) delayed release capsule 30  mg  Dose: 30 mg  Freq: Daily Route: PO  Start: 12/13/24 0900   Admin Instructions:                0846        Empagliflozin (JARDIANCE) tablet 10 mg  Dose: 10 mg  Freq: Daily Route: PO  Start: 12/13/24 0900   Order specific questions:                0846        enoxaparin (LOVENOX) subcutaneous injection 40 mg  Dose: 40 mg  Freq: Daily Route: SC  Start: 12/13/24 0900   Admin Instructions:                0859        fenofibrate (TRICOR) tablet 145 mg  Dose: 145 mg  Freq: Daily Route: PO  Start: 12/13/24 0900   Admin Instructions:                0846        fish oil capsule 2,000 mg  Dose: 2,000 mg  Freq: 2 times daily Route: PO  Start: 12/12/24 1915   Admin Instructions:               2200 0846 1800        furosemide (LASIX) injection 40 mg  Dose: 40 mg  Freq: Daily Route: IV  Start: 12/13/24 0900   Admin Instructions:      Order specific questions:                0859        furosemide (LASIX) injection 40 mg  Dose: 40 mg  Freq: Once Route: IV  Start: 12/12/24 2030 End: 12/12/24 2039   Admin Instructions:      Order specific questions:               2039         insulin glargine (LANTUS) subcutaneous injection 15 Units 0.15 mL  Dose: 15 Units  Freq: Daily at bedtime Route: SC  Start: 12/12/24 2200   Admin Instructions:               2201 2200        insulin lispro (HumALOG/ADMELOG) 100 units/mL subcutaneous injection 1-5 Units  Dose: 1-5 Units  Freq: Daily at bedtime Route: SC  Start: 12/12/24 2200   Admin Instructions:               2210      2200         insulin lispro (HumALOG/ADMELOG) 100 units/mL subcutaneous injection 1-6 Units  Dose: 1-6 Units  Freq: 3 times daily before meals Route: SC  Start: 12/12/24 1743   Admin Instructions:               (9875) (3667) [id number]        isosorbide mononitrate (IMDUR) 24 hr tablet 90 mg  Dose: 90 mg  Freq: Daily Route: PO  Start: 12/13/24 0900   Admin Instructions:      Order specific questions:                0846        lidocaine (LMX) 4 %  cream  Freq: Once Route: TP  Start: 12/09/24 1130 End: 12/09/24 1129   Admin Instructions:      Order specific questions:            1129            lidocaine (LMX) 4 % cream  Freq: Once Route: TP  Start: 12/03/24 1115 End: 12/03/24 1114   Admin Instructions:      Order specific questions:                   magnesium sulfate 2 g/50 mL IVPB (premix) 2 g  Dose: 2 g  Freq: Once Route: IV  Last Dose: Stopped (12/12/24 1242)  Start: 12/12/24 1145 End: 12/12/24 1242   Admin Instructions:               1142     1242         multivitamin-minerals (CENTRUM) tablet 1 tablet  Dose: 1 tablet  Freq: Daily Route: PO  Start: 12/13/24 0900   Admin Instructions:                0846        pantoprazole (PROTONIX) EC tablet 40 mg  Dose: 40 mg  Freq: Daily Route: PO  Start: 12/13/24 0900   Admin Instructions:                0846        pentoxifylline (TRENtal) ER tablet 400 mg  Dose: 400 mg  Freq: 3 times daily with meals Route: PO  Start: 12/13/24 0730   Admin Instructions:                0846     1213     1630        potassium chloride (Klor-Con M10) CR tablet 10 mEq  Dose: 10 mEq  Freq: Once Route: PO  Start: 12/13/24 0545 End: 12/13/24 0632   Admin Instructions:                0632        pregabalin (LYRICA) capsule 150 mg  Dose: 150 mg  Freq: 2 times daily Route: PO  Start: 12/12/24 1915   Admin Instructions:               2200      0846     1800        ranolazine (RANEXA) 12 hr tablet 1,000 mg  Dose: 1,000 mg  Freq: 2 times daily Route: PO  Start: 12/12/24 1915   Admin Instructions:               2210      0846     1800        sitaGLIPtin (JANUVIA) tablet 25 mg  Dose: 25 mg  Freq: Daily Route: PO  Start: 12/13/24 0900   Admin Instructions:                0846        sodium chloride 0.9 % bolus 500 mL  Dose: 500 mL  Freq: Once Route: IV  Last Dose: Stopped (12/12/24 1545)  Start: 12/12/24 1145 End: 12/12/24 1545            1218     1545         torsemide (DEMADEX) tablet 20 mg  Dose: 20 mg  Freq: Daily Route: PO  Start: 12/13/24  0900 End: 12/13/24 0825   Order specific questions:                0825-D/C'd      Legend:       [id number]/0412/0512/0612/0712/0812/0912/1012/1112/1212/13        Continuous Meds Sorted by Name  for Thomas Horne as of 12/04/24 through 12/13/24  Legend:       Medications 12/04 12/05 12/06 12/07 12/08 12/09 12/10 12/11 12/12 12/13               PRN Meds Sorted by Name  for Thomas Horne as of 12/04/24 through 12/13/24  Legend:       Medications 12/04 12/05 12/06 12/07 12/08 12/09 12/10 12/11 12/12 12/13   acetaminophen (TYLENOL) tablet 650 mg  Dose: 650 mg  Freq: Every 6 hours PRN Route: PO  PRN Reasons: mild pain,headaches,fever  Indications of Use: FEVER,HEADACHE,MILD PAIN  Start: 12/12/24 1545                                ED Triage Vitals   Temperature Pulse Respirations Blood Pressure SpO2 Pain Score   12/12/24 1123 12/12/24 1123 12/12/24 1123 12/12/24 1123 12/12/24 1123 12/12/24 1135   (!) 97.1 °F (36.2 °C) (!) 27 18 (!) 0/0 95 % No Pain     Weight (last 2 days)       Date/Time Weight    12/16/24 0541 99.3 (219)    12/15/24 0600 101 (223)    12/14/24 0600 104 (229.28)            Vital Signs (last 3 days)       Date/Time Temp Pulse Resp BP MAP (mmHg) SpO2 O2 Device Patient Position - Orthostatic VS Cara Coma Scale Score Pain    12/16/24 02:07:56 97.5 °F (36.4 °C) 72 22 150/65 93 95 % -- -- -- --    12/15/24 22:40:43 98.6 °F (37 °C) 64 22 140/74 96 94 % -- -- -- --    12/15/24 2100 -- -- -- -- -- -- -- -- -- No Pain    12/15/24 18:30:12 98 °F (36.7 °C) 69 19 152/81 105 95 % -- -- -- --    12/15/24 0800 -- -- -- -- -- -- -- -- 15 No Pain    12/15/24 07:46:11 97.8 °F (36.6 °C) 54 20 145/69 94 94 % None (Room air) -- -- --    12/15/24 02:26:23 98.5 °F (36.9 °C) 41 20 138/58 85 94 % -- -- -- --    12/14/24 23:06:37 98.7 °F (37.1 °C) 48 19 142/63 89 95 % -- -- -- --    12/14/24 2100 -- -- -- -- -- -- -- -- -- No Pain    12/14/24 18:12:27 97.7 °F (36.5 °C) 71 18 147/73 98 95 % -- -- -- --    12/14/24 15:27:23  97.8 °F (36.6 °C) 70 22 134/69 91 96 % -- -- -- --    12/14/24 0800 -- -- -- -- -- -- -- -- 15 No Pain    12/14/24 07:51:39 97.9 °F (36.6 °C) 59 18 154/70 98 94 % -- -- -- --    12/14/24 03:10:43 97.9 °F (36.6 °C) 46 18 151/74 100 98 % -- -- -- --    12/13/24 23:05:21 98 °F (36.7 °C) 50 19 144/62 89 96 % -- -- -- --    12/13/24 2000 -- -- -- -- -- -- -- -- -- No Pain    12/13/24 16:58:02 97.9 °F (36.6 °C) 64 16 143/66 92 97 % -- -- -- --    12/13/24 0900 -- -- -- -- -- -- -- -- 15 No Pain    12/13/24 08:44:44 -- 58 -- 143/51 82 98 % -- -- -- --    12/13/24 0805 -- 43 -- 128/59 -- 95 % -- -- -- --    12/13/24 02:39:51 -- 61 -- 128/59 82 94 % -- -- -- --    12/13/24 02:39:27 -- 64 -- 128/59 82 95 % -- Lying -- --              Pertinent Labs/Diagnostic Test Results:   Radiology:   VAS VENOUS DUPLEX - LOWER LIMB BILATERAL   Final Interpretation by Yakov Mercado DO (12/13 1002)      XR chest portable   Final Interpretation by Rene Lacey MD (12/13 7544)      Hypoventilation with increased interstitial opacities suggesting interstitial edema. Atypical pneumonia is less likely. This finding is new.                  Workstation performed: [id number]           Cardiology:  Echo follow up/limited w/ contrast if indicated   Final Result by Jaziel Castellanos MD (12/13 5772)        Left Ventricle: Left ventricular cavity size is upper normal. Wall    thickness is mildly increased. The left ventricular ejection fraction is    50% by biplane measurement. Systolic function is mildly reduced. There is    mild global hypokinesis with regional variation. Diastolic function is    abnormal.  Left atrial filling pressure is elevated.     IVS: There is abnormal septal motion consistent with ventricular    pre-excitation.     Right Ventricle: Right ventricular cavity size is mildly dilated.    Systolic function is low normal. Normal tricuspid annular plane systolic    excursion (TAPSE) > 1.7 cm.     Left Atrium: The atrium is moderately  dilated (42-48 mL/m2).     Right Atrium: The atrium is mildly dilated.     Aortic Valve: The leaflets are moderately thickened. The leaflets are    moderately calcified. There is severely reduced mobility. There is    moderate to severe stenosis. The peak aortic velocity was measured in the    suprasternal view. The aortic valve peak velocity is 3.46 m/s. The aortic    valve peak gradient is 48 mmHg. The aortic valve mean gradient is 29 mmHg.    The dimensionless velocity index is 0.26. The aortic valve area is 1.05    cm2. The stroke volume index is 36.50 ml/m2.     Mitral Valve: There is mild regurgitation.     Tricuspid Valve: The right ventricular systolic pressure is mildly to    moderately elevated.     Prior TTE study available for comparison. Prior study date: 9/7/2024.    No major change in EF or severity of aortic valve. Frequent PVC are noted         ECG 12 lead   Final Result by Naun Bell MD (12/13 0804)   Baseline artifact   Undetermined rhythm , possibly Sinus with PVCs and PACs   Left axis deviation   Left ventricular hypertrophy with QRS widening   Cannot rule out Septal infarct ST & T wave abnormality, consider lateral    ischemia   Abnormal ECG   Confirmed by Naun Bell ([id number]) on 12/13/2024 8:04:26 AM      ECG 12 lead   Final Result by Naun Bell MD (12/12 1204)   Sinus Rhythm w/ PVCs in Bigeminy   Left axis deviation   Left ventricular hypertrophy with QRS widening   Cannot rule out Septal infarct (cited on or before 09-Apr-2024)   Abnormal ECG      Confirmed by Naun Blel ([id number]) on 12/12/2024 12:04:51 PM      ECG 12 lead   Final Result by Naun Bell MD (12/12 1204)   Sinus bradycardia with 1st degree A-V block with frequent Premature    ventricular complexes in a pattern of bigeminy   Left axis deviation   Left ventricular hypertrophy with QRS widening   Cannot rule out Septal infarct T wave abnormality, consider lateral    ischemia   Abnormal ECG   Confirmed by Naun Bell ([id number])  "on 12/12/2024 12:03:58 PM        GI:  No orders to display           Results from last 7 days   Lab Units 12/16/24  0352 12/15/24  0442 12/14/24  0527 12/13/24 0447 12/12/24  1142   WBC Thousand/uL 7.17 8.39 8.16 10.87* 10.89*   HEMOGLOBIN g/dL 14.5 13.8 12.5 11.9* 12.9   HEMATOCRIT % 47.0 43.2 39.5 38.1 41.3   PLATELETS Thousands/uL 207 223 161 166 199   TOTAL NEUT ABS Thousands/µL  --   --   --   --  8.57*         Results from last 7 days   Lab Units 12/16/24  0352 12/15/24  0442 12/14/24  0527 12/13/24  0447 12/12/24  1142   SODIUM mmol/L 136 140 137 138 137   POTASSIUM mmol/L 4.2 3.7 3.9 3.9 5.0   CHLORIDE mmol/L 106 103 104 105 104   CO2 mmol/L 22 29 24 29 29   ANION GAP mmol/L 8 8 9 4 4   BUN mg/dL 31* 33* 29* 28* 30*   CREATININE mg/dL 1.35* 1.43* 1.40* 1.47* 1.42*   EGFR ml/min/1.73sq m 48 45 46 44 45   CALCIUM mg/dL 9.1 9.8 9.0 8.8 9.3   MAGNESIUM mg/dL 2.1  --  2.0 2.1 2.2     Results from last 7 days   Lab Units 12/12/24  1142   AST U/L 15   ALT U/L 13   ALK PHOS U/L 37   TOTAL PROTEIN g/dL 7.4   ALBUMIN g/dL 4.0   TOTAL BILIRUBIN mg/dL 0.67     Results from last 7 days   Lab Units 12/15/24  2005 12/15/24  1609 12/15/24  1131 12/15/24  0742 12/14/24 2007 12/14/24  1547 12/14/24  1046 12/14/24  0721 12/13/24  2057 12/13/24  1653 12/13/24  1129 12/13/24  0757   POC GLUCOSE mg/dl 202* 186* 210* 111 170* 131 155* 104 145* 155* 178* 124     Results from last 7 days   Lab Units 12/16/24  0352 12/15/24  0442 12/14/24  0527 12/13/24  0447 12/12/24  1142   GLUCOSE RANDOM mg/dL 146* 105 102 122 142*             No results found for: \"BETA-HYDROXYBUTYRATE\"                   Results from last 7 days   Lab Units 12/12/24  1351 12/12/24  1142   HS TNI 0HR ng/L  --  35   HS TNI 2HR ng/L 33  --    HSTNI D2 ng/L -2  --      Results from last 7 days   Lab Units 12/12/24  2056   D-DIMER QUANTITATIVE ug/ml FEU 1.74*     Results from last 7 days   Lab Units 12/12/24  1142   PROTIME seconds 14.6   INR  1.08   PTT seconds 35* "     Results from last 7 days   Lab Units 12/12/24  1142   TSH 3RD GENERATON uIU/mL 1.890     Results from last 7 days   Lab Units 12/14/24  0527   PROCALCITONIN ng/ml 0.13  0.13                 Results from last 7 days   Lab Units 12/12/24  1142   BNP pg/mL 1,067*                                     Results from last 7 days   Lab Units 12/12/24  1656   CLARITY UA  Clear   COLOR UA  Yellow   SPEC GRAV UA  1.020   PH UA  5.5   GLUCOSE UA mg/dl 300 (3/10%)*   KETONES UA mg/dl Negative   BLOOD UA  Negative   PROTEIN UA mg/dl Negative   NITRITE UA  Negative   BILIRUBIN UA  Negative   UROBILINOGEN UA (BE) mg/dl <2.0   LEUKOCYTES UA  Negative                                                   Past Medical History:   Diagnosis Date    Anemia     CAD (coronary artery disease)     Callus     Cancer (HCC)     prostate    CHF (congestive heart failure) (HCC)     Chronic kidney disease     Clotting disorder ([facility])     Coronary artery disease 1988    Deep vein thrombosis ([facility])     Diabetes mellitus ([facility])     Difficulty walking     Duodenal ulcer     Ear problems     Heart disease 1988    HL (hearing loss)     Hyperlipidemia     Hypertension     Myocardial infarction ([facility])     Neuropathy     Bilateral feet    Neuropathy in diabetes ([facility])     Plantar fasciitis     Sleep apnea     Could not tolerate CPAP     Present on Admission:   Acute deep vein thrombosis (DVT) of left peroneal vein ([facility])   Acute on chronic diastolic HF (heart failure) ([facility])   Acute on chronic HFpEF/Moderate pHTN (HFpEF) ([facility])   Chronic kidney disease-mineral and bone disorder   Coronary artery disease involving native coronary artery of native heart without angina pectoris   Diabetic ulcer of toe of left foot associated with type 2 diabetes mellitus, limited to breakdown of skin (HCC)   Diabetic ulcer of right midfoot associated with diabetes mellitus due to underlying condition, limited to breakdown of skin (HCC)   Duodenal ulcer   Frequent PVCs   Mixed  hyperlipidemia   Multiple gastric ulcers   Iron deficiency anemia due to chronic blood loss   Nonrheumatic aortic valve stenosis   Peripheral artery disease (HCC)   Primary hypertension   Iron deficiency anemia   Stage 3b chronic kidney disease (HCC)      Admitting Diagnosis: Bradycardia [R00.1]  Bigeminy [I49.8]  PVC (premature ventricular contraction) [I49.3]  Slow heart rate [R00.1]  Symptomatic bradycardia [R00.1]  Age/Sex: 79 y.o. male    Network Utilization Review Department  ATTENTION: Please call with any questions or concerns to [phone] and carefully listen to the prompts so that you are directed to the right person. All voicemails are confidential.   For Discharge needs, contact Care Management DC Support Team at [phone] opt. [phone]  Send all requests for admission clinical reviews, approved or denied determinations and any other requests to dedicated fax number below belonging to the campus where the patient is receiving treatment. List of dedicated fax numbers for the Facilities:  FACILITY NAME UR FAX NUMBER   ADMISSION DENIALS (Administrative/Medical Necessity) [phone]   DISCHARGE SUPPORT TEAM (NETWORK) [phone]   PARENT CHILD HEALTH (Maternity/NICU/Pediatrics) [phone]   [facility] [phone]   [facility] [phone]   [facility] [phone]   [facility] [phone]   [facility] [phone]   [facility] [phone]   [facility] [phone]   [facility] [phone]   [facility] [phone]   [facility] [phone]   [facility] [phone]   [facility] [phone]

## 2024-12-16 NOTE — PROGRESS NOTES
Progress Note - Cardiology   [facility]     Thomas Horne 79 y.o. male MRN: [id number]  : 1945  Unit/Bed#: [address] [place] [address] Encounter: [id number]    Assessment and Plan:     1.  Bradycardia patient was admitted with bradycardia with heart ranging from 20 to 30 bpm.  He had sick sinus syndrome.  He needed beta-blocker as he has frequent PVCs.  His.  Today he underwent dual-chamber pacemaker of [employer] without any complications.  Follow-up chest x-ray and other post pacemaker orders.    2.  CAD s/p coronary artery disease with chronic stable angina.  Patient history of CABG.  Currently asymptomatic he is on daughter and now will be started on beta-blocker.    3.  Acute on chronic diastolic heart failure currently seems to be compensated comfortable lying flat.  Will discontinue IV diuretics start him on torsemide from tomorrow.  Amlodipine 2.5 mg daily.  Will increase dose if needed for blood pressure.  Appears to be euvolemic.    3.  Hypertensive heart disease blood pressure acceptable currently on Toprol- mg daily, losartan 25, torsemide 20 mg, Jardiance as well as on losartan 25 check electrolytes.    4.  Dyslipidemia continue statins.    5.  Cardiac murmur with mild to moderate aortic stenosis.  Echo Doppler reviewed.    6  Type 2 diabetes mellitus management as per medical team    7.  Peripheral vascular disease    8.  Chronic kidney disease.  Creatinine has been 1.34.    9.  Frequent PVCs.  Started on beta-blocker can titrate up as tolerated now patient has pacemaker    10.  Diabetic foot ulcer associated with skin breakdown.  Management as per wound care.    Continue current Rx.  Follow-up pacemaker chest x-ray ordered labs from tomorrow.  Cussed with patient and medical team.    Subjective / Objective:   Patient seen and evaluated today.  Feels well.  Underwent dual-chamber pacemaker without any complication.  He got antibiotics.  Issue related to pacemaker were  "discussed with him including risk of infection due to his wound.  He was cleared by the wound care as per my discussion with cardiology.  No other significant issues today.  His labs were reviewed.    Vitals: Blood pressure 139/75, pulse 69, temperature 97.5 °F (36.4 °C), resp. rate 22, height 6' 2\" (1.88 m), weight 99.3 kg (219 lb), SpO2 92%.  Vitals:    12/15/24 0600 12/16/24 0541   Weight: 101 kg (223 lb) 99.3 kg (219 lb)     Body mass index is 28.12 kg/m².  BP Readings from Last 3 Encounters:   12/16/24 139/75   12/12/24 163/84   12/11/24 116/60     Orthostatic Blood Pressures      Flowsheet Row Most Recent Value   Blood Pressure 139/75 filed at 12/16/2024 1000   Patient Position - Orthostatic VS Lying filed at 12/13/2024 0239          I/O         12/14 0701  12/15 0700 12/15 0701  12/16 0700 12/16 0701  12/17 0700    P.O. 1700 1100     Total Intake(mL/kg) 1700 (16.8) 1100 (11.1)     Urine (mL/kg/hr) 2200 (0.9) 2425 (1)     Total Output 2200 2425     Net -500 -1325                  Invasive Devices       Peripheral Intravenous Line  Duration             Peripheral IV 12/12/24 Distal;Right;Upper;Ventral (anterior) Arm 3 days                      Intake/Output Summary (Last 24 hours) at 12/16/2024 1015  Last data filed at 12/16/2024 0352  Gross per 24 hour   Intake 600 ml   Output 2025 ml   Net -1425 ml         Physical Exam:   Physical Exam    Neurologic:  Alert & oriented x 3,  no focal deficits noted   Constitutional:  Well developed, well nourished,  With no acute distress  Eyes:  PERRL, conjunctiva normal   HENT:  Atraumatic, external ears normal, nose normal, .  NECK: Normal range of motion, no tenderness, neck is supple , No JVP  Respiratory:  Bilateral air entry mostly clear to auscultation  Cardiovascular: S1-S2 regular with a 3/6 ejection systolic murmur. S4 is heard  GI:  Soft, nondistended, normal bowel sounds, nontender, no hepatosplenomegaly appreciated  Musculoskeletal:  No tenderness, no " deformities.    Extremities: No significant edema.  Peripheral wounds are wrapped.  Psychiatric:  Speech and behavior appropriate   Pacemaker site no swelling no redness no hematoma.          Medications/ Allergies:     Current Facility-Administered Medications   Medication Dose Route Frequency Provider Last Rate    acetaminophen  650 mg Oral Q6H PRN Kristina Davalos MD      [START ON 12/17/2024] amLODIPine  2.5 mg Oral Daily Danielle Pereira MD      aspirin  81 mg Oral Daily Kristina Davalos MD      atorvastatin  40 mg Oral Daily Kristina Davalos MD      cefazolin  2,000 mg Intravenous Q8H Danielle Pereira MD      clopidogrel  75 mg Oral Daily Kristina Davalos MD      DULoxetine  30 mg Oral Daily Kristina Davalos MD      Empagliflozin  10 mg Oral Daily Kristina Davalos MD      enoxaparin  40 mg Subcutaneous Daily Kristina Davalos MD      fenofibrate  145 mg Oral Daily Kristina Davalos MD      fish oil  2,000 mg Oral BID Kristina Davalos MD      insulin glargine  15 Units Subcutaneous HS Kristina Davalos MD      insulin lispro  1-5 Units Subcutaneous HS Kristina Davalos MD      insulin lispro  1-6 Units Subcutaneous TID AC Kristina Davalos MD      isosorbide mononitrate  90 mg Oral Daily Kristina Davalos MD      losartan  25 mg Oral Daily Danielle Pereira MD      metoprolol succinate  100 mg Oral Daily Danielle Pereira MD      multivitamin-minerals  1 tablet Oral Daily Kristina Davalos MD      pantoprazole  40 mg Oral Daily Kristina Davalos MD      pentoxifylline  400 mg Oral TID With Meals Kristina Davalos MD      pregabalin  150 mg Oral BID Kristina Davalos MD      ranolazine  1,000 mg Oral BID Kristina Davalos MD      sitaGLIPtin  25 mg Oral Daily Kristina Davalos MD      sodium chloride  1 spray Each Nare Q1H PRN Kristina Davalos MD      [START ON 12/17/2024] torsemide  20 mg Oral Daily Danielle Pereira MD       acetaminophen, 650 mg, Q6H PRN  sodium chloride, 1 spray, Q1H PRN      Allergies   Allergen Reactions     Lisinopril Rash and Lip Swelling           Labs:   Troponins:  Results from last 7 days   Lab Units 12/12/24 2129 12/12/24  1351   HS TNI RAND ng/L 29*  --    HSTNI D2 ng/L  --  -2         CBC with diff:  Results from last 7 days   Lab Units 12/16/24  0352 12/15/24  0442 12/14/24  0527 12/13/24  0447 12/12/24  1142   WBC Thousand/uL 7.17 8.39 8.16 10.87* 10.89*   HEMOGLOBIN g/dL 14.5 13.8 12.5 11.9* 12.9   HEMATOCRIT % 47.0 43.2 39.5 38.1 41.3   MCV fL 88 85 86 86 88   PLATELETS Thousands/uL 207 223 161 166 199   RBC Million/uL 5.34 5.09 4.59 4.41 4.71   MCH pg 27.2 27.1 27.2 27.0 27.4   MCHC g/dL 30.9* 31.9 31.6 31.2* 31.2*   RDW % 14.1 14.1 14.2 14.4 14.4   MPV fL 11.2 11.8 12.4 11.5 11.6   NRBC AUTO /100 WBCs  --   --   --   --  0       CMP:  Results from last 7 days   Lab Units 12/16/24  0352 12/15/24  0442 12/14/24  0527 12/13/24  0447 12/12/24  1142   SODIUM mmol/L 136 140 137 138 137   POTASSIUM mmol/L 4.2 3.7 3.9 3.9 5.0   CHLORIDE mmol/L 106 103 104 105 104   CO2 mmol/L 22 29 24 29 29   ANION GAP mmol/L 8 8 9 4 4   BUN mg/dL 31* 33* 29* 28* 30*   CREATININE mg/dL 1.35* 1.43* 1.40* 1.47* 1.42*   GLUCOSE FASTING mg/dL  --   --   --  122*  --    CALCIUM mg/dL 9.1 9.8 9.0 8.8 9.3   AST U/L  --   --   --   --  15   ALT U/L  --   --   --   --  13   ALK PHOS U/L  --   --   --   --  37   TOTAL PROTEIN g/dL  --   --   --   --  7.4   ALBUMIN g/dL  --   --   --   --  4.0   TOTAL BILIRUBIN mg/dL  --   --   --   --  0.67   EGFR ml/min/1.73sq m 48 45 46 44 45       Magnesium:  Results from last 7 days   Lab Units 12/16/24  0352 12/14/24  0527 12/13/24  0447 12/12/24  1142   MAGNESIUM mg/dL 2.1 2.0 2.1 2.2     Coags:  Results from last 7 days   Lab Units 12/12/24  1142   PTT seconds 35*   INR  1.08     TSH:  Results from last 7 days   Lab Units 12/12/24  1142   TSH 3RD GENERATON uIU/mL 1.890         Imaging & Testing   I have personally reviewed pertinent reports.    Echo follow up/limited w/ contrast if  indicated  Result Date: 12/13/2024  Narrative:   Left Ventricle: Left ventricular cavity size is upper normal. Wall thickness is mildly increased. The left ventricular ejection fraction is 50% by biplane measurement. Systolic function is mildly reduced. There is mild global hypokinesis with regional variation. Diastolic function is abnormal.  Left atrial filling pressure is elevated.   IVS: There is abnormal septal motion consistent with ventricular pre-excitation.   Right Ventricle: Right ventricular cavity size is mildly dilated. Systolic function is low normal. Normal tricuspid annular plane systolic excursion (TAPSE) > 1.7 cm.   Left Atrium: The atrium is moderately dilated (42-48 mL/m2).   Right Atrium: The atrium is mildly dilated.   Aortic Valve: The leaflets are moderately thickened. The leaflets are moderately calcified. There is severely reduced mobility. There is moderate to severe stenosis. The peak aortic velocity was measured in the suprasternal view. The aortic valve peak velocity is 3.46 m/s. The aortic valve peak gradient is 48 mmHg. The aortic valve mean gradient is 29 mmHg. The dimensionless velocity index is 0.26. The aortic valve area is 1.05 cm2. The stroke volume index is 36.50 ml/m2.   Mitral Valve: There is mild regurgitation.   Tricuspid Valve: The right ventricular systolic pressure is mildly to moderately elevated.   Prior TTE study available for comparison. Prior study date: 9/7/2024. No major change in EF or severity of aortic valve. Frequent PVC are noted          EKG / Monitor: Personally reviewed.    Currently on the monitor atrial paced with frequent PVCs        Results for orders placed during the hospital encounter of 02/16/22    NM Myocardial Perfusion Spect (Pharmacological Induced Stress and/or Rest)    Interpretation Summary    Stress Combined Conclusion: Left ventricular perfusion is abnormal. There is mild photon reduction in the anterior wall at stress.  Findings are  "consistent with ischemia. Additional area of infarct involving the mid and apical portion of the inferior wall.    Stress Function: Left ventricular function post-stress is abnormal. Global function is mildly reduced. There were no regional wall motion abnormalities during stress. Post-stress ejection fraction is 40 %.    Stress ECG: No ST deviation is noted. There were no arrhythmias during recovery. . The ECG was negative for ischemia.    Perfusion: There is a left ventricular perfusion defect that is small in size with severe reduction in uptake present in the mid to apical inferior location(s) that is fixed. There is a left ventricular perfusion defect that is small to medium in size with mild reduction in uptake present in the mid to apical anterior location(s) that is reversible.      Dr. Danielle Pereira MD [facility]      \"This note was completed in part utilizing [employer] direct voice recognition software.   Grammatical errors, random word insertion, spelling mistakes, and incomplete sentences may be an occasional consequence of the system secondary to software limitations, ambient noise and hardware issues.    Please read the chart carefully and recognize, using context, where substitutions have occurred.  If you have any questions or concerns about the context, text or information contained within the body of this dictation, please contact myself, the provider, for further clarification.\"  "

## 2024-12-16 NOTE — PROGRESS NOTES
Progress Note - Hospitalist   Name: Thomas Horne 79 y.o. male I MRN: [id number]  Unit/Bed#: 4 [address] 420-01 I Date of Admission: 12/12/2024   Date of Service: 12/16/2024 I Hospital Day: 3    Assessment & Plan  Bradycardia  Referred to ER from wound care center with concerns of bradycardia  EKG with normal sinus rhythm frequent PVCs in bigeminy  History of bradycardia, metoprolol was recently decreased by cardiology as outpatient  Concern about pseudo bradycardia in setting of PVCs versus true  Telemetry with improved heart rate after holding mars blocking agents but significant PVCs  Hemodynamically stable  Status post pacemaker placement 12/16/2024  Telemetry  Resuming metoprolol  Follow-up chest x-ray  Follow-up cardiology   Coronary artery disease involving native coronary artery of native heart without angina pectoris  History of CAD status post CABG in 2002 and status post PCI with NADNINI distal left circumflex 2021  Continue aspirin, isosorbide, Ranexa, statin  Acute on chronic HFpEF/Moderate pHTN (HFpEF) (HCC)  Wt Readings from Last 3 Encounters:   12/16/24 99.3 kg (219 lb)   12/11/24 107 kg (236 lb)   12/11/24 107 kg (236 lb)     Echocardiogram 9/24-EF 50%, grade 2 diastolic dysfunction, moderate to severe aortic stenosis,  Patient reports symptoms of shortness of breath at times and with activity.  proBNP 1067  Patient denies any worsening of leg edema or weight gain.  Noted to have increased shortness of breath and oxygen requirement, evening 12/12  Chest x-ray with evidence of interstitial edema, less likely pneumonia  Repeat 2D echo-EF 50%, abnormal diastolic function, elevated LA pressure.  Moderate to severe aortic stenosis.  No major changes from prior but noted to have significant PVCs  Volume overload likely precipitated by bradycardia and PVCs  Improved with IV Lasix  Transitioning to torsemide  Metoprolol resumed post pacemaker placement  Continue Jardiance  Monitor intake output, daily  weight  Fluid and salt restriction  Follow-up cardiology recommendations  Primary hypertension  Blood pressure stable  Continue amlodipine, isosorbide  Held metoprolol, clonidine in setting of bradycardia prior to pacemaker placement  Resuming metoprolol  Resumed torsemide  Monitor blood pressure trend  Type 2 diabetes mellitus with diabetic polyneuropathy, with long-term current use of insulin (HCC)  Lab Results   Component Value Date    HGBA1C 6.2 (H) 10/14/2024       Recent Labs     12/15/24  1609 12/15/24  2005 12/16/24  0710 12/16/24  1113   POCGLU 186* 202* 125 207*       Blood Sugar Average: Last 72 hrs:  (P) 153.8110561891870367  Home regimen, Jardiance, Januvia, Lantus 15 units nightly  Continue Jardiance, currently on 10 mg, patient is on 25 mg at home  Continue Januvia  Continue glargine 15 units nightly  Insulin sliding scale  Diabetic diet  Monitor blood glucose trend  Stage 3b chronic kidney disease (HCC)  With baseline creatinine 1.6-1.8  Currently at baseline/lower than baseline  Monitor  Peripheral artery disease (HCC)  Continue aspirin, clopidogrel, pentoxifylline  Multiple open wounds of lower extremity  In setting of peripheral artery disease and diabetes, currently undergoing HBO2 treatment  No evidence of current or history of infection associated with wounds  Patient will have to hold HBO2 treatment after pacemaker placement until incision is healed and cleared by cardiology  Mixed hyperlipidemia  Continue atorvastatin  S/P AAA repair    H/O prostate cancer  Status post prostatectomy  Iron deficiency anemia  History of iron deficiency anemia, suspected secondary to GI loss  Status post IV iron infusion in the past  Now improved  History of DVT (deep vein thrombosis)  History of acute nonocclusive DVT of left gastrocnemius and peroneal vein, 4/24  Likely provoked in setting of preceding hospitalization  Patient was treated with Coumadin  D-dimer noted to be elevated likely nonspecific  Venous  Doppler bilateral lower extremity-without any evidence of DVT  Continue DVT prophylaxis    VTE Pharmacologic Prophylaxis: VTE Score: 4 Moderate Risk (Score 3-4) - Pharmacological DVT Prophylaxis Ordered: enoxaparin (Lovenox).    Mobility:   Basic Mobility Inpatient Raw Score: 21  JH-HLM Goal: 6: Walk 10 steps or more  JH-HLM Achieved: 2: Bed activities/Dependent transfer  JH-HLM Goal achieved. Continue to encourage appropriate mobility.    Patient Centered Rounds: I performed bedside rounds with nursing staff today.   Discussions with Specialists or Other Care Team Provider: Cardiology    Education and Discussions with Family / Patient: Patient declined call to .     Current Length of Stay: 3 day(s)  Current Patient Status: Inpatient   Certification Statement: The patient, admitted on an observation basis, will now require > 2 midnight hospital stay due to arrhythmia, CHF  Discharge Plan: Anticipate discharge in 24-48 hrs to home.    Code Status: Level 3 - DNAR and DNI    Subjective     Underwent pacemaker placement today  Reports that he feels well  Denies any pain or shortness of breath        Objective :  Temp:  [97.5 °F (36.4 °C)-98.6 °F (37 °C)] 97.5 °F (36.4 °C)  HR:  [64-72] 69  BP: (125-152)/(65-81) 139/75  Resp:  [19-22] 22  SpO2:  [92 %-95 %] 92 %  O2 Device: Nasal cannula  Nasal Cannula O2 Flow Rate (L/min):  [2 L/min] 2 L/min    Body mass index is 28.12 kg/m².     Input and Output Summary (last 24 hours):     Intake/Output Summary (Last 24 hours) at 12/16/2024 1104  Last data filed at 12/16/2024 0352  Gross per 24 hour   Intake 600 ml   Output 2025 ml   Net -1425 ml       Physical Exam  Constitutional:       General: He is not in acute distress.  HENT:      Head: Normocephalic and atraumatic.   Cardiovascular:      Rate and Rhythm: Normal rate.      Comments: Left infraclavicular pacemaker dressing in place  Pulmonary:      Effort: Pulmonary effort is normal. No respiratory distress.       Breath sounds: Decreased breath sounds present. No rhonchi.   Abdominal:      General: Bowel sounds are normal. There is no distension.      Palpations: Abdomen is soft.      Tenderness: There is no abdominal tenderness. There is no guarding or rebound.   Musculoskeletal:      Right lower leg: No edema.      Left lower leg: No edema.   Skin:     General: Skin is warm and dry.      Findings: No rash.   Neurological:      Mental Status: He is alert. Mental status is at baseline.      Cranial Nerves: No cranial nerve deficit.         Lines/Drains:        Telemetry:  Telemetry Orders (From admission, onward)               24 Hour Telemetry Monitoring  Continuous x 24 Hours (Telem)        Question:  Reason for 24 Hour Telemetry  Answer:  Arrhythmias requiring acute medical intervention / PPM or ICD malfunction                     Telemetry Reviewed: Normal Sinus Rhythm with frequent PVCs/bigeminy  Indication for Continued Telemetry Use: Arrthymias requiring medical therapy               Lab Results: I have reviewed the following results:   Results from last 7 days   Lab Units 12/16/24 0352 12/13/24 0447 12/12/24  1142   WBC Thousand/uL 7.17   < > 10.89*   HEMOGLOBIN g/dL 14.5   < > 12.9   HEMATOCRIT % 47.0   < > 41.3   PLATELETS Thousands/uL 207   < > 199   SEGS PCT %  --   --  79*   LYMPHO PCT %  --   --  13*   MONO PCT %  --   --  6   EOS PCT %  --   --  2    < > = values in this interval not displayed.     Results from last 7 days   Lab Units 12/16/24  0352 12/13/24 0447 12/12/24  1142   SODIUM mmol/L 136   < > 137   POTASSIUM mmol/L 4.2   < > 5.0   CHLORIDE mmol/L 106   < > 104   CO2 mmol/L 22   < > 29   BUN mg/dL 31*   < > 30*   CREATININE mg/dL 1.35*   < > 1.42*   ANION GAP mmol/L 8   < > 4   CALCIUM mg/dL 9.1   < > 9.3   ALBUMIN g/dL  --   --  4.0   TOTAL BILIRUBIN mg/dL  --   --  0.67   ALK PHOS U/L  --   --  37   ALT U/L  --   --  13   AST U/L  --   --  15   GLUCOSE RANDOM mg/dL 146*   < > 142*    < > =  values in this interval not displayed.     Results from last 7 days   Lab Units 12/12/24  1142   INR  1.08     Results from last 7 days   Lab Units 12/16/24  0710 12/15/24  2005 12/15/24  1609 12/15/24  1131 12/15/24  0742 12/14/24 2007 12/14/24  1547 12/14/24  1046 12/14/24  0721 12/13/24  2057 12/13/24  1653 12/13/24  1129   POC GLUCOSE mg/dl 125 202* 186* 210* 111 170* 131 155* 104 145* 155* 178*         Results from last 7 days   Lab Units 12/14/24  0527   PROCALCITONIN ng/ml 0.13  0.13       Recent Cultures (last 7 days):         Imaging Results Review: I reviewed radiology reports from this admission including: chest xray.  Other Study Results Review: No additional pertinent studies reviewed.    Last 24 Hours Medication List:     Current Facility-Administered Medications:     acetaminophen (TYLENOL) tablet 650 mg, Q6H PRN    [START ON 12/17/2024] amLODIPine (NORVASC) tablet 2.5 mg, Daily    aspirin chewable tablet 81 mg, Daily    atorvastatin (LIPITOR) tablet 40 mg, Daily    ceFAZolin (ANCEF) IVPB (premix in dextrose) 2,000 mg 50 mL, Q8H    clopidogrel (PLAVIX) tablet 75 mg, Daily    DULoxetine (CYMBALTA) delayed release capsule 30 mg, Daily    Empagliflozin (JARDIANCE) tablet 10 mg, Daily    enoxaparin (LOVENOX) subcutaneous injection 40 mg, Daily    fenofibrate (TRICOR) tablet 145 mg, Daily    fish oil capsule 2,000 mg, BID    insulin glargine (LANTUS) subcutaneous injection 15 Units 0.15 mL, HS    insulin lispro (HumALOG/ADMELOG) 100 units/mL subcutaneous injection 1-5 Units, HS    insulin lispro (HumALOG/ADMELOG) 100 units/mL subcutaneous injection 1-6 Units, TID AC **AND** Fingerstick Glucose (POCT), TID AC    isosorbide mononitrate (IMDUR) 24 hr tablet 90 mg, Daily    losartan (COZAAR) tablet 25 mg, Daily    metoprolol succinate (TOPROL-XL) 24 hr tablet 100 mg, Daily    multivitamin-minerals (CENTRUM) tablet 1 tablet, Daily    pantoprazole (PROTONIX) EC tablet 40 mg, Daily    pentoxifylline (TRENtal)  ER tablet 400 mg, TID With Meals    pregabalin (LYRICA) capsule 150 mg, BID    ranolazine (RANEXA) 12 hr tablet 1,000 mg, BID    sitaGLIPtin (JANUVIA) tablet 25 mg, Daily    sodium chloride (OCEAN) 0.65 % nasal spray 1 spray, Q1H PRN    [START ON 12/17/2024] torsemide (DEMADEX) tablet 20 mg, Daily    Administrative Statements   Today, Patient Was Seen By: Kristina Davalos MD  I have spent a total time of 45 minutes in caring for this patient on the day of the visit/encounter including Counseling / Coordination of care, Documenting in the medical record, Reviewing / ordering tests, medicine, procedures  , Obtaining or reviewing history  , and Communicating with other healthcare professionals .    **Please Note: This note may have been constructed using a voice recognition system.**

## 2024-12-16 NOTE — ASSESSMENT & PLAN NOTE
Referred to ER from wound care center with concerns of bradycardia  EKG with normal sinus rhythm frequent PVCs in bigeminy  History of bradycardia, metoprolol was recently decreased by cardiology as outpatient  Concern about pseudo bradycardia in setting of PVCs versus true  Telemetry with improved heart rate after holding mars blocking agents but significant PVCs  Hemodynamically stable  Status post pacemaker placement 12/16/2024  Telemetry  Resuming metoprolol  Follow-up chest x-ray  Follow-up cardiology

## 2024-12-16 NOTE — ASSESSMENT & PLAN NOTE
History of iron deficiency anemia, suspected secondary to GI loss  Status post IV iron infusion in the past  Now improved

## 2024-12-16 NOTE — ASSESSMENT & PLAN NOTE
Blood pressure stable  Continue amlodipine, isosorbide  Held metoprolol, clonidine in setting of bradycardia prior to pacemaker placement  Resuming metoprolol  Resumed torsemide  Monitor blood pressure trend

## 2024-12-16 NOTE — CASE MANAGEMENT
Case Management Assessment & Discharge Planning Note    Patient name Thomas Horne  Location 4 [facility]/4 [facility] 420-* MRN [id number]  : 1945 Date 2024       Current Admission Date: 2024  Current Admission Diagnosis:Bradycardia   Patient Active Problem List    Diagnosis Date Noted Date Diagnosed    Bradycardia 2024     Multiple open wounds of lower extremity 2024     Foot ulcer (HCC) 2024     SOB (shortness of breath) 2024     Nausea 2024     Elevated troponin 2024     History of DVT (deep vein thrombosis) 2024     Diabetic ulcer of toe of left foot associated with type 2 diabetes mellitus, limited to breakdown of skin ([facility]) 2024     Plantar fasciitis, right 07/10/2024     Acute deep vein thrombosis (DVT) of left peroneal vein ([facility]) 2024     Iron deficiency anemia 2024     Shortness of breath 2024     History of colon polyps 2024     Duodenal ulcer 2024     Multiple gastric ulcers 2023     Iron deficiency anemia due to chronic blood loss 2023     Melena 2023     Symptomatic anemia 2023     Frequent PVCs 2023     Acute on chronic diastolic HF (heart failure) ([facility]) 2023     Stage 3b chronic kidney disease ([facility]) 2023     Diabetic ulcer of right midfoot associated with diabetes mellitus due to underlying condition, limited to breakdown of skin ([facility]) 06/15/2023     Hypertensive urgency 2023     Elevated troponin level not due myocardial infarction 2023     Stable angina pectoris ([facility]) 2023     Chronic kidney disease-mineral and bone disorder 2022     Nonrheumatic aortic valve stenosis 2022     Acute on chronic HFpEF/Moderate pHTN (HFpEF) ([facility]) 11/10/2022     Gross hematuria 2022     Platelets decreased ([facility]) 2022     Acute kidney injury superimposed on chronic kidney disease  ([facility]) 2022     Actinic keratoses 2022     Type 2  diabetes mellitus with diabetic peripheral angiopathy without gangrene, with long-term current use of insulin ([facility]) 01/11/2022     Varicose veins of left lower extremity 06/25/2021     History of endovascular stent graft for abdominal aortic aneurysm (AAA) 06/25/2021     Bruit (arterial) 06/25/2021     Peripheral artery disease (HCC) 06/25/2021     Type 2 diabetes mellitus with diabetic polyneuropathy, with long-term current use of insulin (HCC) 06/10/2021     Mixed hyperlipidemia 05/11/2021     Primary hypertension 05/11/2021     Coronary artery disease involving native coronary artery of native heart without angina pectoris 05/11/2021     S/P angioplasty with stent 05/11/2021     Hx of CABG 05/11/2021     S/P AAA repair 05/11/2021     S/P prostatectomy 05/11/2021     H/O prostate cancer 05/11/2021       LOS (days): 3  Geometric Mean LOS (GMLOS) (days): 5  Days to GMLOS:2     OBJECTIVE:    Risk of Unplanned Readmission Score: 23.93      Current admission status: Inpatient     Preferred Pharmacy:   [facility] [place], [place] - [address] [address]  Phone: [phone] Fax: [phone]    [facility] [place] [address]  [address] [place] [address]  Phone: [phone] Fax: [phone]    [id number] - [place], [place] - [address] [facility]  [address] [facility] [address]  Phone: [phone] Fax: [phone]    Primary Care Provider: Frank Lombardi, DO    Primary Insurance: JUAN BROOKS  Secondary Insurance:     ASSESSMENT:  Active Health Care Proxies       Whitley Hodges [facility] Care Representative - Daughter   Primary Phone: [phone] (Mobile)            Readmission Root Cause  30 Day Readmission: No    Patient Information  Admitted from:: Home  Mental Status: Alert  During Assessment patient was accompanied by: Not accompanied during assessment  Assessment information provided  by:: Patient  Primary Caregiver: Self  Support Systems: Daughter, Family members  County of Residence: [place]  What city do you live in?: [place], [place]  Home entry access options. Select all that apply.: Stairs  Number of steps to enter home.: 4  Type of Current Residence: 3 story home  Upon entering residence, is there a bedroom on the main floor (no further steps)?: No  A bedroom is located on the following floor levels of residence (select all that apply):: 2nd Floor  Upon entering residence, is there a bathroom on the main floor (no further steps)?: Yes (1/2 bath 1st floor, full bath 2nd floor)  Living Arrangements: Lives w/ Daughter (Whitley)    Activities of Daily Living Prior to Admission  Functional Status: Independent  Completes ADLs independently?: Yes  Ambulates independently?: Yes  Does patient use assisted devices?: Yes  Assisted Devices (DME) used: Straight Cane (as needed)  Does patient currently own DME?: Yes  What DME does the patient currently own?: Straight Cane, Walker  Does the patient have a history of Short-Term Rehab?: Yes  Does patient have a history of HHC?: Yes  Does patient currently have HHC?: No     Patient Information Continued  Income Source: Pension/senior care  Does patient have prescription coverage?: Yes  Does patient receive dialysis treatments?: No     Means of Transportation  Means of Transport to Appts:: Family transport    DISCHARGE DETAILS:    Discharge planning discussed with:: Patient  Freedom of Choice: Yes  Comments - Freedom of Choice: SW met bedside with patient to introduce role, complete assessment, and discuss discharge plan. Patient states he was independent prior to admission and has continued to ambulate well in his room. Patient's choice is to return home on discharge. Patient denies having any needs or interest in HHC services.     Were Treatment Team discharge recommendations reviewed with patient/caregiver?: Yes  Did patient/caregiver verbalize understanding  of patient care needs?: Yes  Were patient/caregiver advised of the risks associated with not following Treatment Team discharge recommendations?: Yes    Contacts  Patient Contacts: Whitley Hodges (dtr)  Relationship to Patient:: Family  Contact Method: Phone  Phone Number: [phone]  Reason/Outcome: Emergency Contact    Requested Home Health Care         Is the patient interested in HHC at discharge?: No    DME Referral Provided  Referral made for DME?: No    Other Referral/Resources/Interventions Provided:  Interventions: None Indicated     Treatment Team Recommendation: Home  Discharge Destination Plan:: Home  Transport at Discharge : Family     IMM Given (Date):: 12/16/24  IMM Given to:: Patient (IMM#2 reviewed with patient. Patient gave verbal understanding, signed, and was provided with original. Copy placed in scan bin.)

## 2024-12-16 NOTE — ASSESSMENT & PLAN NOTE
Wt Readings from Last 3 Encounters:   12/16/24 99.3 kg (219 lb)   12/11/24 107 kg (236 lb)   12/11/24 107 kg (236 lb)     Echocardiogram 9/24-EF 50%, grade 2 diastolic dysfunction, moderate to severe aortic stenosis,  Patient reports symptoms of shortness of breath at times and with activity.  proBNP 1067  Patient denies any worsening of leg edema or weight gain.  Noted to have increased shortness of breath and oxygen requirement, evening 12/12  Chest x-ray with evidence of interstitial edema, less likely pneumonia  Repeat 2D echo-EF 50%, abnormal diastolic function, elevated LA pressure.  Moderate to severe aortic stenosis.  No major changes from prior but noted to have significant PVCs  Volume overload likely precipitated by bradycardia and PVCs  Improved with IV Lasix  Transitioning to torsemide  Metoprolol resumed post pacemaker placement  Continue Jardiance  Monitor intake output, daily weight  Fluid and salt restriction  Follow-up cardiology recommendations

## 2024-12-17 ENCOUNTER — TRANSITIONAL CARE MANAGEMENT (OUTPATIENT)
Dept: FAMILY MEDICINE CLINIC | Facility: CLINIC | Age: 79
End: 2024-12-17

## 2024-12-17 ENCOUNTER — TELEPHONE (OUTPATIENT)
Dept: FAMILY MEDICINE CLINIC | Facility: CLINIC | Age: 79
End: 2024-12-17

## 2024-12-17 ENCOUNTER — APPOINTMENT (INPATIENT)
Dept: RADIOLOGY | Facility: HOSPITAL | Age: 79
DRG: 242 | End: 2024-12-17
Payer: COMMERCIAL

## 2024-12-17 VITALS
BODY MASS INDEX: 28.49 KG/M2 | SYSTOLIC BLOOD PRESSURE: 124 MMHG | OXYGEN SATURATION: 94 % | RESPIRATION RATE: 17 BRPM | HEART RATE: 66 BPM | WEIGHT: 222 LBS | TEMPERATURE: 97.9 F | HEIGHT: 74 IN | DIASTOLIC BLOOD PRESSURE: 64 MMHG

## 2024-12-17 LAB
ANION GAP SERPL CALCULATED.3IONS-SCNC: 10 MMOL/L (ref 4–13)
BUN SERPL-MCNC: 39 MG/DL (ref 5–25)
CALCIUM SERPL-MCNC: 9.1 MG/DL (ref 8.4–10.2)
CHLORIDE SERPL-SCNC: 103 MMOL/L (ref 96–108)
CHOLEST SERPL-MCNC: 137 MG/DL (ref ?–200)
CO2 SERPL-SCNC: 24 MMOL/L (ref 21–32)
CREAT SERPL-MCNC: 1.61 MG/DL (ref 0.6–1.3)
ERYTHROCYTE [DISTWIDTH] IN BLOOD BY AUTOMATED COUNT: 14 % (ref 11.6–15.1)
GFR SERPL CREATININE-BSD FRML MDRD: 39 ML/MIN/1.73SQ M
GLUCOSE SERPL-MCNC: 141 MG/DL (ref 65–140)
GLUCOSE SERPL-MCNC: 148 MG/DL (ref 65–140)
GLUCOSE SERPL-MCNC: 229 MG/DL (ref 65–140)
HCT VFR BLD AUTO: 41.3 % (ref 36.5–49.3)
HDLC SERPL-MCNC: 35 MG/DL
HGB BLD-MCNC: 13.3 G/DL (ref 12–17)
LDLC SERPL CALC-MCNC: 66 MG/DL (ref 0–100)
MAGNESIUM SERPL-MCNC: 2 MG/DL (ref 1.9–2.7)
MCH RBC QN AUTO: 27.3 PG (ref 26.8–34.3)
MCHC RBC AUTO-ENTMCNC: 32.2 G/DL (ref 31.4–37.4)
MCV RBC AUTO: 85 FL (ref 82–98)
NONHDLC SERPL-MCNC: 102 MG/DL
PLATELET # BLD AUTO: 228 THOUSANDS/UL (ref 149–390)
PMV BLD AUTO: 11.6 FL (ref 8.9–12.7)
POTASSIUM SERPL-SCNC: 3.7 MMOL/L (ref 3.5–5.3)
RBC # BLD AUTO: 4.88 MILLION/UL (ref 3.88–5.62)
SODIUM SERPL-SCNC: 137 MMOL/L (ref 135–147)
TRIGL SERPL-MCNC: 178 MG/DL (ref ?–150)
WBC # BLD AUTO: 9.44 THOUSAND/UL (ref 4.31–10.16)

## 2024-12-17 PROCEDURE — 85027 COMPLETE CBC AUTOMATED: CPT | Performed by: INTERNAL MEDICINE

## 2024-12-17 PROCEDURE — 71046 X-RAY EXAM CHEST 2 VIEWS: CPT

## 2024-12-17 PROCEDURE — 82948 REAGENT STRIP/BLOOD GLUCOSE: CPT

## 2024-12-17 PROCEDURE — 80061 LIPID PANEL: CPT | Performed by: INTERNAL MEDICINE

## 2024-12-17 PROCEDURE — 80048 BASIC METABOLIC PNL TOTAL CA: CPT | Performed by: INTERNAL MEDICINE

## 2024-12-17 PROCEDURE — 99024 POSTOP FOLLOW-UP VISIT: CPT | Performed by: INTERNAL MEDICINE

## 2024-12-17 PROCEDURE — 99239 HOSP IP/OBS DSCHRG MGMT >30: CPT | Performed by: INTERNAL MEDICINE

## 2024-12-17 PROCEDURE — 83735 ASSAY OF MAGNESIUM: CPT | Performed by: INTERNAL MEDICINE

## 2024-12-17 RX ORDER — AMLODIPINE BESYLATE 2.5 MG/1
2.5 TABLET ORAL DAILY
Qty: 90 TABLET | Refills: 1 | Status: SHIPPED | OUTPATIENT
Start: 2024-12-17

## 2024-12-17 RX ORDER — LOSARTAN POTASSIUM 25 MG/1
25 TABLET ORAL DAILY
Qty: 90 TABLET | Refills: 1 | Status: SHIPPED | OUTPATIENT
Start: 2024-12-17

## 2024-12-17 RX ORDER — METOPROLOL SUCCINATE 100 MG/1
100 TABLET, EXTENDED RELEASE ORAL DAILY
Qty: 90 TABLET | Refills: 1 | Status: SHIPPED | OUTPATIENT
Start: 2024-12-17

## 2024-12-17 RX ADMIN — RANOLAZINE 1000 MG: 500 TABLET, FILM COATED, EXTENDED RELEASE ORAL at 10:02

## 2024-12-17 RX ADMIN — ASPIRIN 81 MG CHEWABLE TABLET 81 MG: 81 TABLET CHEWABLE at 10:01

## 2024-12-17 RX ADMIN — PENTOXIFYLLINE 400 MG: 400 TABLET, EXTENDED RELEASE ORAL at 11:49

## 2024-12-17 RX ADMIN — PANTOPRAZOLE SODIUM 40 MG: 40 TABLET, DELAYED RELEASE ORAL at 10:02

## 2024-12-17 RX ADMIN — OMEGA-3 FATTY ACIDS CAP 1000 MG 2000 MG: 1000 CAP at 10:02

## 2024-12-17 RX ADMIN — PREGABALIN 150 MG: 75 CAPSULE ORAL at 10:03

## 2024-12-17 RX ADMIN — ISOSORBIDE MONONITRATE 90 MG: 60 TABLET, EXTENDED RELEASE ORAL at 10:01

## 2024-12-17 RX ADMIN — CLOPIDOGREL 75 MG: 75 TABLET ORAL at 10:01

## 2024-12-17 RX ADMIN — METOPROLOL SUCCINATE 100 MG: 100 TABLET, EXTENDED RELEASE ORAL at 10:03

## 2024-12-17 RX ADMIN — INSULIN LISPRO 2 UNITS: 100 INJECTION, SOLUTION INTRAVENOUS; SUBCUTANEOUS at 12:19

## 2024-12-17 RX ADMIN — ENOXAPARIN SODIUM 40 MG: 40 INJECTION SUBCUTANEOUS at 11:49

## 2024-12-17 RX ADMIN — CEFAZOLIN SODIUM 2000 MG: 2 SOLUTION INTRAVENOUS at 08:33

## 2024-12-17 RX ADMIN — TORSEMIDE 20 MG: 20 TABLET ORAL at 10:03

## 2024-12-17 RX ADMIN — SITAGLIPTIN 25 MG: 25 TABLET, FILM COATED ORAL at 10:02

## 2024-12-17 RX ADMIN — PENTOXIFYLLINE 400 MG: 400 TABLET, EXTENDED RELEASE ORAL at 08:34

## 2024-12-17 RX ADMIN — LOSARTAN POTASSIUM 25 MG: 25 TABLET, FILM COATED ORAL at 10:03

## 2024-12-17 RX ADMIN — FENOFIBRATE 145 MG: 145 TABLET, FILM COATED ORAL at 11:49

## 2024-12-17 RX ADMIN — ATORVASTATIN CALCIUM 40 MG: 40 TABLET, FILM COATED ORAL at 10:03

## 2024-12-17 RX ADMIN — Medication 1 TABLET: at 10:03

## 2024-12-17 RX ADMIN — AMLODIPINE BESYLATE 2.5 MG: 2.5 TABLET ORAL at 10:00

## 2024-12-17 RX ADMIN — DULOXETINE HYDROCHLORIDE 30 MG: 30 CAPSULE, DELAYED RELEASE ORAL at 10:02

## 2024-12-17 RX ADMIN — EMPAGLIFLOZIN 10 MG: 10 TABLET, FILM COATED ORAL at 10:00

## 2024-12-17 NOTE — ASSESSMENT & PLAN NOTE
Lab Results   Component Value Date    HGBA1C 6.2 (H) 10/14/2024       Recent Labs     12/16/24  1113 12/16/24  1557 12/16/24 2006 12/17/24  0724   POCGLU 207* 253* 167* 148*       Blood Sugar Average: Last 72 hrs:  (P) [id number]  per primary team  patient follows with wound center for his diabetic ulcer and is undergoing hyperbaric oxygen treatments which seem to be successful

## 2024-12-17 NOTE — PROGRESS NOTES
Progress Note - Cardiology   Name: Thomas Horne 79 y.o. male I MRN: [id number]  Unit/Bed#: 4 [place] [address] I Date of Admission: 12/12/2024   Date of Service: 12/17/2024 I Hospital Day: 4    Assessment & Plan  Bradycardia  patient sent from [facility] due to bradycardia with pulse ox heart rate of 20 to 30  patient noted to have frequent PVCs in bigemy, and most likely pulse ox was not detecting the PVCs as they are not perfused in the periphery.  Will hold patient's clonidine and beta-blocker at this time and continue to monitor  12/16/2024 patient underwent implantation of dual chambered Medtronic pacemaker  post procedure chest x-ray noted mild volume overload but no pneumothorax, patient was given Lasix 40 mg IV times one  for repeat chest x-ray 12/17/2024  Toprol- mg daily resumed on 12/16/2024    Coronary artery disease involving native coronary artery of native heart without angina pectoris  history of CABG times three in 2002 and PCI with drug-eluting stent to distal left circ in 2021  10/19/2022 LHC: triple vessel disease with 100% occluded right coronary artery, 100% occluded mid LAD. SVG to RCA was also hundred percent occluded but RCA had collaterals from left circulation. [place] to LAD was widely patent and no flow was noted in the SVG to the circumflex  continue aspirin 81 mg once a day  continue Ranexa 1000 mg BID  Continue Imdur 30 mg daily  continue Norvasc 5 mg daily  continue Toprol- mg daily  Acute on chronic diastolic HF (heart failure) (HCC)  volume overload most likely due to frequent PVCs and bradycardia  Continue Jardiance, he is currently on 25 mg for diabetes  patient received extra dose of Lasix 40 mg IV times one on 12/16/2024  repeat chest x-ray this morning 12/17/2024  continue Toprol- mgdaily  continue Cozaar 25 mg daily  continue torsemide 20 mg daily  low sodium diet  CHF education  monitor I and O, daily weights and labs    Primary hypertension  Systolic BP  fluctuates between 128 from 154  continue Toprol- mg daily  continue Cozaar 25 mg daily  continue to monitor blood pressure  Mixed hyperlipidemia  continue Lipitor 40 mg daily  Nonrheumatic aortic valve stenosis  9/7/2024 TTE: aortic valve is try leaflet moderately thickened with moderate calcification and moderately reduced mobility. There is mild regurgitation and moderate to severe stenosis with peak gradient of 60 mm Mercury and mean gradient of 32 mmHg    Type 2 diabetes mellitus with diabetic polyneuropathy, with long-term current use of insulin ([facility])  Lab Results   Component Value Date    HGBA1C 6.2 (H) 10/14/2024       Recent Labs     12/16/24  1113 12/16/24  1557 12/16/24 2006 12/17/24  0724   POCGLU 207* 253* 167* 148*   managed per primary team    Peripheral artery disease (HCC)  follows with vascular surgery,  history of bypass grafting to lower extremities  history of EVAR due to AAA  Chronic kidney disease-mineral and bone disorder  Lab Results   Component Value Date    EGFR 39 12/17/2024    EGFR 48 12/16/2024    EGFR 45 12/15/2024    CREATININE 1.61 (H) 12/17/2024    CREATININE 1.35 (H) 12/16/2024    CREATININE 1.43 (H) 12/15/2024     Stage 3b chronic kidney disease ([facility])  Lab Results   Component Value Date    EGFR 39 12/17/2024    EGFR 48 12/16/2024    EGFR 45 12/15/2024    CREATININE 1.61 (H) 12/17/2024    CREATININE 1.35 (H) 12/16/2024    CREATININE 1.43 (H) 12/15/2024   continue to monitor  Frequent PVCs  continue to monitor telemetry  decreased with pacemaker and reintroduction of beta-blocker  Diabetic ulcer of toe of left foot associated with type 2 diabetes mellitus, limited to breakdown of skin ([facility])  Lab Results   Component Value Date    HGBA1C 6.2 (H) 10/14/2024       Recent Labs     12/16/24  1113 12/16/24  1557 12/16/24 2006 12/17/24  0724   POCGLU 207* 253* 167* 148*       Blood Sugar Average: Last 72 hrs:  (P) [id number]  per primary team  patient follows with  wound center for his diabetic ulcer and is undergoing hyperbaric oxygen treatments which seem to be successful      Subjective   Chief Complaint: Patient seen and examined. No complaints offered at this time.      Objective :  Temp:  [97.1 °F (36.2 °C)-97.8 °F (36.6 °C)] 97.6 °F (36.4 °C)  HR:  [47-77] 47  BP: (117-141)/(52-75) 117/59  Resp:  [16-19] 16  SpO2:  [92 %-97 %] 92 %  O2 Device: None (Room air)  Orthostatic Blood Pressures      Flowsheet Row Most Recent Value   Blood Pressure 117/59 filed at 12/17/2024 0337   Patient Position - Orthostatic VS Lying filed at 12/13/2024 0239          First Weight: Weight - Scale: 108 kg (238 lb 1.6 oz) (12/12/24 1139)  Vitals:    12/16/24 0541 12/17/24 0600   Weight: 99.3 kg (219 lb) 101 kg (222 lb)     Physical Exam  Vitals and nursing note reviewed.   Constitutional:       General: He is not in acute distress.     Appearance: Normal appearance. He is obese.   HENT:      Right Ear: External ear normal.      Left Ear: External ear normal.   Eyes:      General: No scleral icterus.        Right eye: No discharge.         Left eye: No discharge.   Cardiovascular:      Rate and Rhythm: Normal rate and regular rhythm.      Pulses: Normal pulses.      Heart sounds: Normal heart sounds.      Comments: Pressure dressing removed from left anterior chest wall, Aqua cell dressing in place clean dry and intact without any hematoma  Pulmonary:      Effort: Pulmonary effort is normal. No respiratory distress.      Breath sounds: Normal breath sounds.   Abdominal:      General: Bowel sounds are normal. There is no distension.      Palpations: Abdomen is soft.   Musculoskeletal:      Right lower leg: No edema.      Left lower leg: No edema.   Skin:     General: Skin is warm and dry.      Capillary Refill: Capillary refill takes less than 2 seconds.   Neurological:      General: No focal deficit present.      Mental Status: He is alert and oriented to person, place, and time. Mental status  "is at baseline.   Psychiatric:         Mood and Affect: Mood normal.           Lab Results: I have reviewed the following results:  Results from last 7 days   Lab Units 12/17/24  0517 12/16/24  0352 12/15/24  0442   WBC Thousand/uL 9.44 7.17 8.39   HEMOGLOBIN g/dL 13.3 14.5 13.8   HEMATOCRIT % 41.3 47.0 43.2   PLATELETS Thousands/uL 228 207 223     Results from last 7 days   Lab Units 12/17/24  0517 12/16/24  0352 12/15/24  0442   POTASSIUM mmol/L 3.7 4.2 3.7   CHLORIDE mmol/L 103 106 103   CO2 mmol/L 24 22 29   BUN mg/dL 39* 31* 33*   CREATININE mg/dL 1.61* 1.35* 1.43*   CALCIUM mg/dL 9.1 9.1 9.8     Results from last 7 days   Lab Units 12/12/24  1142   INR  1.08   PTT seconds 35*     Lab Results   Component Value Date    HGBA1C 6.2 (H) 10/14/2024     No results found for: \"CKTOTAL\", \"CKMB\", \"CKMBINDEX\", \"TROPONINI\"    Telemetry reviewed: atrial pacing with appropriate capture through the ventricles, occasional PVC    VTE Pharmacologic Prophylaxis: VTE covered by:  enoxaparin, Subcutaneous, 40 mg at 12/16/24 1023     VTE Mechanical Prophylaxis: sequential compression device    Alisia BUSTILLOS  Cardiology  "

## 2024-12-17 NOTE — ASSESSMENT & PLAN NOTE
Lab Results   Component Value Date    EGFR 39 12/17/2024    EGFR 48 12/16/2024    EGFR 45 12/15/2024    CREATININE 1.61 (H) 12/17/2024    CREATININE 1.35 (H) 12/16/2024    CREATININE 1.43 (H) 12/15/2024

## 2024-12-17 NOTE — ASSESSMENT & PLAN NOTE
Hemoglobin stable    Results from last 7 days   Lab Units 12/17/24  0517 12/16/24  0352 12/15/24  0442 12/14/24  0527   HEMOGLOBIN g/dL 13.3 14.5 13.8 12.5

## 2024-12-17 NOTE — ASSESSMENT & PLAN NOTE
Lab Results   Component Value Date    HGBA1C 6.2 (H) 10/14/2024     Results from last 7 days   Lab Units 12/17/24  1134 12/17/24  0724 12/16/24 2006 12/16/24  1557 12/16/24  1113 12/16/24  0710 12/15/24  2005 12/15/24  1609   POC GLUCOSE mg/dl 229* 148* 167* 253* 207* 125 202* 186*     Stable continue previous/home regimen

## 2024-12-17 NOTE — ASSESSMENT & PLAN NOTE
Followed by cardiology this admission.  Clonidine discontinued.  Continue amlodipine  Metoprolol reintroduced and uptitrated to 100 mg daily

## 2024-12-17 NOTE — ASSESSMENT & PLAN NOTE
Visitation with shortness of breath and elevated BNP  Was followed by cardiology  Was on IV furosemide  Has been transitioned to home dose torsemide

## 2024-12-17 NOTE — ASSESSMENT & PLAN NOTE
History of CAD status post CABG/PCI HFpEF diabetes and hypertension who was sent in from wound care for bradycardia  Metoprolol was recently decreased due to bradycardia  Seen by cardiology this admission.  Status post pacemaker placement.

## 2024-12-17 NOTE — DISCHARGE INSTR - AVS FIRST PAGE
Permanent Pacemaker Discharge Instructions    -  DO NOT raise your affected arm over your head for 4 to 6 weeks      -  TAKE Aquacel dressing off of incision site in 5-7 days, beneath dressing will be Steri-Strips over the incision, these will dry up in fall off on their own.      -  DO NOT drive until seen by the Cardiologist for site visit      -  DO NOT lift, pull or push anything over 10-15 pounds for at least 3-4 weeks      -  DO NOT manipulate or put pressure over the pacemaker site      -  Avoid wearing clothing that must be pulled over your head for 4-6 weeks    Incision/Site Care    -  Check the incision daily.  Expect to see a scab forming over the incision and the skin color returning to normal.  Notify your doctor immediately if your incision suddenly becomes red, swollen, hot to touch or develops any kind of drainage or bleeding.      -  monitor for fever greater than 101 and for any of the above call the MD      -  checked incision daily.  Expect to see some bruising and swelling.  These will go away within several weeks.  Call cardiologist if you note this worsening.      -  keep the incision clean and dry.  You may place a sterile gauze pad over the incision and change it daily.        -  do not put on any medication over the incision site unless you have been given specific instructions by the physician.      -  over your incision are Steri (paper) strips, leave these in place and they will curl up and fall off on their own as you heal.        -  try to expose the incision to air at least for several hours per day.      -  gently wash around pacemaker and incision area with plain water and a clean washcloth daily.  Gently towel dry the incision area.

## 2024-12-17 NOTE — DISCHARGE INSTR - OTHER ORDERS
Wound Care Plan:    1-Cleanse wound to left medial heel and right anterior lower leg with normal saline or mild soap and water & pat dry. Apply THIN layer of Normlgel to wounds, Adaptic cut to size of wounds, 1/2 ABD pad, rolled gauze and tape. Change every other day & as needed for soilage/dislodgement.  2-Cleanse wound to right 1st toe with normal saline or mild soap and water & pat dry. Apply Betadine to wound, not to intact skin please. Change every other day & as needed for soilage/dislodgement.  3-Apply Prevent barrier cream or equivalent to bilateral sacrum, buttock and right heel 2x/day and as needed.  4-Float heels on 2 pillows to offload pressure so heels are not in contact with mattress or pillows.  5-Use pressure redistribution cushion in chair when out of bed. Avoid prolonged sitting.  6-Moisturize skin daily with skin nourishing cream.  7-Turn/reposition ever 2 hrs in bed and every hr when out of bed to chair for pressure re-distribution on skin.   8-Follow up with Dr Sotelo at [facility]. Call [phone] to schedule or change your appointment.

## 2024-12-17 NOTE — ASSESSMENT & PLAN NOTE
patient sent from wound center due to bradycardia with pulse ox heart rate of 20 to 30  patient noted to have frequent PVCs in bigemy, and most likely pulse ox was not detecting the PVCs as they are not perfused in the periphery.  Will hold patient's clonidine and beta-blocker at this time and continue to monitor  12/16/2024 patient underwent implantation of dual chambered Medtronic pacemaker  post procedure chest x-ray noted mild volume overload but no pneumothorax, patient was given Lasix 40 mg IV times one  for repeat chest x-ray 12/17/2024  Toprol- mg daily resumed on 12/16/2024

## 2024-12-17 NOTE — ASSESSMENT & PLAN NOTE
Lab Results   Component Value Date    HGBA1C 6.2 (H) 10/14/2024       Recent Labs     12/16/24  1113 12/16/24  1557 12/16/24 2006 12/17/24  0724   POCGLU 207* 253* 167* 148*   managed per primary team

## 2024-12-17 NOTE — DISCHARGE SUMMARY
Discharge Summary - Hospitalist   Name: Thomas Horne 79 y.o. male I MRN: [id number]  Unit/Bed#: 4 [place] 420-01 I Date of Admission: 12/12/2024   Date of Service: 12/17/2024 I Hospital Day: 4    Assessment & Plan  Bradycardia  History of CAD status post CABG/PCI HFpEF diabetes and hypertension who was sent in from wound care for bradycardia  Metoprolol was recently decreased due to bradycardia  Seen by cardiology this admission.  Status post pacemaker placement.  Coronary artery disease involving native coronary artery of native heart without angina pectoris  CAD with history of CABG and PCI  No chest pain.  Continue isosorbide and Ranexa  Acute on chronic HFpEF/Moderate pHTN (HFpEF) ([facility])  Visitation with shortness of breath and elevated BNP  Was followed by cardiology  Was on IV furosemide  Has been transitioned to home dose torsemide  Primary hypertension  Followed by cardiology this admission.  Clonidine discontinued.  Continue amlodipine  Metoprolol reintroduced and uptitrated to 100 mg daily  Type 2 diabetes mellitus with diabetic polyneuropathy, with long-term current use of insulin ([facility])  Lab Results   Component Value Date    HGBA1C 6.2 (H) 10/14/2024     Results from last 7 days   Lab Units 12/17/24  1134 12/17/24  0724 12/16/24 2006 12/16/24  1557 12/16/24  1113 12/16/24  0710 12/15/24  2005 12/15/24  1609   POC GLUCOSE mg/dl 229* 148* 167* 253* 207* 125 202* 186*     Stable continue previous/home regimen  Stage 3b chronic kidney disease (HCC)  Renal function at baseline    Results from last 7 days   Lab Units 12/17/24  0517 12/16/24  0352 12/15/24  0442 12/14/24  0527 12/13/24  0447 12/12/24  1142   BUN mg/dL 39* 31* 33* 29* 28* 30*   CREATININE mg/dL 1.61* 1.35* 1.43* 1.40* 1.47* 1.42*   EGFR ml/min/1.73sq m 39 48 45 46 44 45     Peripheral artery disease (HCC)  Continue DAPT and pentoxifylline  Multiple open wounds of lower extremity  PAD continue outpatient follow-up with wound care  Needs to hold  hyperbarics until cleared by cardiology post pacemaker placement  Mixed hyperlipidemia  Continue atorvastatin  H/O prostate cancer  History of prostatectomy  Iron deficiency anemia  Hemoglobin stable    Results from last 7 days   Lab Units 12/17/24  0517 12/16/24  0352 12/15/24  0442 12/14/24  0527   HEMOGLOBIN g/dL 13.3 14.5 13.8 12.5     History of DVT (deep vein thrombosis)  D-dimer elevated but lower extremity duplex negative this admission      Medical Problems       Resolved Problems  Date Reviewed: 12/17/2024   None        Discharging Physician / Practitioner: Arthur Cavazos DO  PCP: Frank Lombardi, DO  Admission Date:   Admission Orders (From admission, onward)       Ordered        12/13/24 1542  INPATIENT ADMISSION  Once            12/12/24 1319  Place in Observation  Once                        Discharge Date: 12/17/24    Consultations During Hospital Stay:  IP CONSULT TO CARDIOLOGY     Procedures Performed:   Procedure(s) (LRB):  Cardiac pacer implant (Left)     Images:   XR chest 2 views  Result Date: 12/17/2024  Impression: No significant interval change. Stable mild pulmonary vascular congestion. No pneumothorax. Workstation performed: [id number]     XR chest portable  Result Date: 12/16/2024  Impression: Mild pulmonary venous congestion. Workstation performed: [id number]     XR chest portable  Result Date: 12/13/2024  Impression: Hypoventilation with increased interstitial opacities suggesting interstitial edema. Atypical pneumonia is less likely. This finding is new. Workstation performed: [id number]       VAS VENOUS DUPLEX - LOWER LIMB BILATERAL  Result Date: 12/13/2024  Impression: RIGHT LOWER LIMB: No evidence of acute or chronic deep vein thrombosis. No evidence of superficial thrombophlebitis noted. Doppler evaluation shows a normal response to augmentation maneuvers.. Popliteal, posterior tibial and anterior tibial arterial Doppler waveforms are monophasic (Hx BPG).  LEFT LOWER LIMB: No  evidence of acute or chronic deep vein thrombosis. No evidence of superficial thrombophlebitis noted. Deep vein reflux noted in the popliteal and peroneal veins. Doppler evaluation shows a normal response to augmentation maneuvers. Popliteal, posterior tibial and anterior tibial arterial Doppler waveforms are monophasic (Hx BPG).  SIGNATURE: Electronically Signed by: MARIZOL DOS SANTOS DO on 2024-12-13 10:02:24 AM      Lab Results: I have reviewed the following results:  Results from last 7 days   Lab Units 12/17/24  0517 12/16/24  0352 12/15/24  0442 12/14/24  0527 12/13/24  0447 12/12/24  1142   WBC Thousand/uL 9.44 7.17 8.39 8.16 10.87* 10.89*   HEMOGLOBIN g/dL 13.3 14.5 13.8 12.5 11.9* 12.9   HEMATOCRIT % 41.3 47.0 43.2 39.5 38.1 41.3   MCV fL 85 88 85 86 86 88   PLATELETS Thousands/uL 228 207 223 161 166 199   INR   --   --   --   --   --  1.08     Results from last 7 days   Lab Units 12/17/24  0517 12/16/24  0352 12/15/24  0442 12/14/24  0527 12/13/24  0447 12/12/24  1142   SODIUM mmol/L 137 136 140 137 138 137   POTASSIUM mmol/L 3.7 4.2 3.7 3.9 3.9 5.0   CHLORIDE mmol/L 103 106 103 104 105 104   CO2 mmol/L 24 22 29 24 29 29   BUN mg/dL 39* 31* 33* 29* 28* 30*   CREATININE mg/dL 1.61* 1.35* 1.43* 1.40* 1.47* 1.42*   CALCIUM mg/dL 9.1 9.1 9.8 9.0 8.8 9.3   ALBUMIN g/dL  --   --   --   --   --  4.0   TOTAL BILIRUBIN mg/dL  --   --   --   --   --  0.67   ALK PHOS U/L  --   --   --   --   --  37   ALT U/L  --   --   --   --   --  13   AST U/L  --   --   --   --   --  15   EGFR ml/min/1.73sq m 39 48 45 46 44 45   GLUCOSE RANDOM mg/dL 141* 146* 105 102 122 142*         Results from last 7 days   Lab Units 12/12/24  1351 12/12/24  1142   HS TNI 0HR ng/L  --  35   HS TNI 2HR ng/L 33  --           Results from last 7 days   Lab Units 12/12/24  2056   D-DIMER QUANTITATIVE ug/ml FEU 1.74*     Results from last 7 days   Lab Units 12/17/24  1134 12/17/24  0724 12/16/24  2006 12/16/24  1557 12/16/24  1113 12/16/24  0710  "12/15/24  2005 12/15/24  1609 12/15/24  1131 12/15/24  0742   POC GLUCOSE mg/dl 229* 148* 167* 253* 207* 125 202* 186* 210* 111         Results from last 7 days   Lab Units 12/12/24  1142   TSH 3RD GENERATON uIU/mL 1.890     Results from last 7 days   Lab Units 12/14/24  0527   PROCALCITONIN ng/ml 0.13  0.13     Results from last 7 days   Lab Units 12/17/24  0517   TRIGLYCERIDES mg/dL 178*   CHOLESTEROL mg/dL 137   LDL CALC mg/dL 66   HDL mg/dL 35*               Incidental Findings:        Test Results Pending at Discharge (will require follow up):      Reason for Admission:   Slow Heart Rate (States he was at wound care in hyperbaric chamber and is HR was as low as 28 and states sent to ED. States he gets dizzy when he closes his eyes. No chest pain. EKG in progress)    Hospital Course:   Thomas Horne is a 79 y.o. male patient who originally presented to the hospital on 12/12/2024 due to bradycardia at wound care.  He was in hyperbarics when he was noted to have bradycardia.  He was seen by cardiology this admission and underwent pacemaker placement.  He also had decompensated CHF requiring IV furosemide thought secondary to bradycardia.  He is being discharged home in good condition with medication adjustments as outlined    Please see above list of diagnoses and related plan for additional information.     Condition at Discharge: stable     Discharge Day Visit / Exam:   Subjective: Patient seen and examined.  Feeling good.  Wants to go home    Vitals: Blood Pressure: 124/64 (12/17/24 0912)  Pulse: 66 (12/17/24 0912)  Temperature: 97.9 °F (36.6 °C) (12/17/24 0912)  Temp Source: Oral (12/17/24 0912)  Respirations: 17 (12/17/24 0912)  Height: 6' 2\" (188 cm) (12/13/24 0805)  Weight - Scale: 101 kg (222 lb) (12/17/24 0600)  SpO2: 94 % (12/17/24 0912)    Exam:   Physical Exam  Vitals reviewed.   Constitutional:       General: He is not in acute distress.  HENT:      Head: Atraumatic.   Cardiovascular:      Rate and " Rhythm: Regular rhythm.   Pulmonary:      Effort: Pulmonary effort is normal.      Breath sounds: Decreased breath sounds present.   Abdominal:      General: Bowel sounds are normal.      Palpations: Abdomen is soft.      Tenderness: There is no abdominal tenderness.   Musculoskeletal:         General: No swelling or tenderness.   Skin:     General: Skin is warm and dry.   Neurological:      General: No focal deficit present.      Mental Status: He is alert.   Psychiatric:         Mood and Affect: Mood normal.     Discharge instructions/Information to patient and family:   See after visit summary for information provided to patient and family.      Provisions for Follow-Up Care:  See after visit summary for information related to follow-up care and any pertinent home health orders.      Mobility at time of Discharge:  Basic Mobility Inpatient Raw Score: 21  JH-HLM Goal: 6: Walk 10 steps or more  JH-HLM Achieved: 7: Walk 25 feet or more  JH-HLM Goal achieved. Continue to encourage appropriate mobility.    Disposition:   Home    Planned Readmission: No     Discharge Medications:  See after visit summary for reconciled discharge medications provided to patient and family.      Administrative Statements   I spent 35 minutes discharging the patient. This time was spent on the day of discharge. I had direct contact with the patient on the day of discharge. Greater than 50% of the total time was spent examining patient, answering all patient questions, arranging and discussing plan of care with patient as well as directly providing post-discharge instructions.  Additional time then spent on discharge activities.    **Please Note: This note may have been constructed using a voice recognition system**

## 2024-12-17 NOTE — PLAN OF CARE
Problem: Potential for Falls  Goal: Patient will remain free of falls  Description: INTERVENTIONS:  - Educate patient/family on patient safety including physical limitations  - Instruct patient to call for assistance with activity   - Consult OT/PT to assist with strengthening/mobility   - Keep Call bell within reach  - Keep bed low and locked with side rails adjusted as appropriate  - Keep care items and personal belongings within reach  - Initiate and maintain comfort rounds    Outcome: Adequate for Discharge     Problem: Prexisting or High Potential for Compromised Skin Integrity  Goal: Skin integrity is maintained or improved  Description: INTERVENTIONS:  - Identify patients at risk for skin breakdown  - Assess and monitor skin integrity  - Assess and monitor nutrition and hydration status  - Monitor labs   - Assess for incontinence   - Turn and reposition patient  - Assist with mobility/ambulation  - Relieve pressure over bony prominences  - Avoid friction and shearing  - Provide appropriate hygiene as needed including keeping skin clean and dry  - Evaluate need for skin moisturizer/barrier cream  - Collaborate with interdisciplinary team   - Patient/family teaching  - Consider wound care consult   Outcome: Adequate for Discharge     Problem: CARDIOVASCULAR - ADULT  Goal: Maintains optimal cardiac output and hemodynamic stability  Description: INTERVENTIONS:  - Monitor I/O, vital signs and rhythm  - Monitor for S/S and trends of decreased cardiac output  - Administer and titrate ordered vasoactive medications to optimize hemodynamic stability  - Assess quality of pulses, skin color and temperature  - Assess for signs of decreased coronary artery perfusion  - Instruct patient to report change in severity of symptoms  Outcome: Adequate for Discharge  Goal: Absence of cardiac dysrhythmias or at baseline rhythm  Description: INTERVENTIONS:  - Continuous cardiac monitoring, vital signs, obtain 12 lead EKG if  ordered  - Administer antiarrhythmic and heart rate control medications as ordered  - Monitor electrolytes and administer replacement therapy as ordered  Outcome: Adequate for Discharge     Problem: SKIN/TISSUE INTEGRITY - ADULT  Goal: Skin Integrity remains intact(Skin Breakdown Prevention)  Description: Assess:  -Perform Dev assessment   -Clean and moisturize skin     Outcome: Adequate for Discharge

## 2024-12-17 NOTE — ASSESSMENT & PLAN NOTE
history of CABG times three in 2002 and PCI with drug-eluting stent to distal left circ in 2021  10/19/2022 [facility]: triple vessel disease with 100% occluded right coronary artery, 100% occluded mid LAD. SVG to RCA was also hundred percent occluded but RCA had collaterals from left circulation. [place] to LAD was widely patent and no flow was noted in the SVG to the circumflex  continue aspirin 81 mg once a day  continue Ranexa 1000 mg BID  Continue Imdur 30 mg daily  continue Norvasc 5 mg daily  continue Toprol- mg daily

## 2024-12-17 NOTE — NURSING NOTE
AVS printed and reviewed with the patient at the bedside. Opportunity for questions was provided. The patient is discharged to home in stable condition.

## 2024-12-17 NOTE — ASSESSMENT & PLAN NOTE
volume overload most likely due to frequent PVCs and bradycardia  Continue Jardiance, he is currently on 25 mg for diabetes  patient received extra dose of Lasix 40 mg IV times one on 12/16/2024  repeat chest x-ray this morning 12/17/2024  continue Toprol- mgdaily  continue Cozaar 25 mg daily  continue torsemide 20 mg daily  low sodium diet  CHF education  monitor I and O, daily weights and labs

## 2024-12-17 NOTE — ASSESSMENT & PLAN NOTE
Renal function at baseline    Results from last 7 days   Lab Units 12/17/24  0517 12/16/24  0352 12/15/24  0442 12/14/24  0527 12/13/24  0447 12/12/24  1142   BUN mg/dL 39* 31* 33* 29* 28* 30*   CREATININE mg/dL 1.61* 1.35* 1.43* 1.40* 1.47* 1.42*   EGFR ml/min/1.73sq m 39 48 45 46 44 45

## 2024-12-17 NOTE — ASSESSMENT & PLAN NOTE
PAD continue outpatient follow-up with wound care  Needs to hold hyperbarics until cleared by cardiology post pacemaker placement

## 2024-12-17 NOTE — ASSESSMENT & PLAN NOTE
Systolic BP fluctuates between 128 from 154  continue Toprol- mg daily  continue Cozaar 25 mg daily  continue to monitor blood pressure

## 2024-12-17 NOTE — TELEPHONE ENCOUNTER
First attempt to reach Thomas. Message left to call back on 12/17/24 so I can schedule a TCM visit Imani

## 2024-12-17 NOTE — ASSESSMENT & PLAN NOTE
Lab Results   Component Value Date    EGFR 39 12/17/2024    EGFR 48 12/16/2024    EGFR 45 12/15/2024    CREATININE 1.61 (H) 12/17/2024    CREATININE 1.35 (H) 12/16/2024    CREATININE 1.43 (H) 12/15/2024   continue to monitor

## 2024-12-19 ENCOUNTER — APPOINTMENT (OUTPATIENT)
Dept: LAB | Facility: CLINIC | Age: 79
End: 2024-12-19
Payer: COMMERCIAL

## 2024-12-19 ENCOUNTER — RA CDI HCC (OUTPATIENT)
Dept: OTHER | Facility: HOSPITAL | Age: 79
End: 2024-12-19

## 2024-12-19 ENCOUNTER — TELEPHONE (OUTPATIENT)
Dept: HEMATOLOGY ONCOLOGY | Facility: MEDICAL CENTER | Age: 79
End: 2024-12-19

## 2024-12-19 DIAGNOSIS — D50.0 IRON DEFICIENCY ANEMIA DUE TO CHRONIC BLOOD LOSS: Primary | ICD-10-CM

## 2024-12-19 DIAGNOSIS — I35.0 NONRHEUMATIC AORTIC VALVE STENOSIS: ICD-10-CM

## 2024-12-19 DIAGNOSIS — D50.0 IRON DEFICIENCY ANEMIA DUE TO CHRONIC BLOOD LOSS: ICD-10-CM

## 2024-12-19 DIAGNOSIS — E11.42 TYPE 2 DIABETES MELLITUS WITH DIABETIC POLYNEUROPATHY, WITH LONG-TERM CURRENT USE OF INSULIN (HCC): ICD-10-CM

## 2024-12-19 DIAGNOSIS — E53.8 VITAMIN B12 DEFICIENCY: ICD-10-CM

## 2024-12-19 DIAGNOSIS — E53.8 FOLATE DEFICIENCY: ICD-10-CM

## 2024-12-19 DIAGNOSIS — Z86.718 HISTORY OF DVT (DEEP VEIN THROMBOSIS): Chronic | ICD-10-CM

## 2024-12-19 DIAGNOSIS — N18.32 STAGE 3B CHRONIC KIDNEY DISEASE (HCC): ICD-10-CM

## 2024-12-19 DIAGNOSIS — Z79.4 TYPE 2 DIABETES MELLITUS WITH DIABETIC POLYNEUROPATHY, WITH LONG-TERM CURRENT USE OF INSULIN (HCC): ICD-10-CM

## 2024-12-19 DIAGNOSIS — R79.89 ELEVATED TROPONIN LEVEL NOT DUE MYOCARDIAL INFARCTION: ICD-10-CM

## 2024-12-19 LAB
ALBUMIN SERPL BCG-MCNC: 4 G/DL (ref 3.5–5)
ALP SERPL-CCNC: 41 U/L (ref 34–104)
ALT SERPL W P-5'-P-CCNC: 6 U/L (ref 7–52)
ANION GAP SERPL CALCULATED.3IONS-SCNC: 9 MMOL/L (ref 4–13)
AST SERPL W P-5'-P-CCNC: 16 U/L (ref 13–39)
BASOPHILS # BLD AUTO: 0.04 THOUSANDS/ΜL (ref 0–0.1)
BASOPHILS NFR BLD AUTO: 1 % (ref 0–1)
BILIRUB SERPL-MCNC: 0.55 MG/DL (ref 0.2–1)
BUN SERPL-MCNC: 42 MG/DL (ref 5–25)
CALCIUM SERPL-MCNC: 9.9 MG/DL (ref 8.4–10.2)
CHLORIDE SERPL-SCNC: 103 MMOL/L (ref 96–108)
CO2 SERPL-SCNC: 27 MMOL/L (ref 21–32)
CREAT SERPL-MCNC: 1.69 MG/DL (ref 0.6–1.3)
EOSINOPHIL # BLD AUTO: 0.18 THOUSAND/ΜL (ref 0–0.61)
EOSINOPHIL NFR BLD AUTO: 2 % (ref 0–6)
ERYTHROCYTE [DISTWIDTH] IN BLOOD BY AUTOMATED COUNT: 14 % (ref 11.6–15.1)
FOLATE SERPL-MCNC: 18.4 NG/ML
GFR SERPL CREATININE-BSD FRML MDRD: 37 ML/MIN/1.73SQ M
GLUCOSE SERPL-MCNC: 189 MG/DL (ref 65–140)
HCT VFR BLD AUTO: 43.3 % (ref 36.5–49.3)
HGB BLD-MCNC: 13.5 G/DL (ref 12–17)
IMM GRANULOCYTES # BLD AUTO: 0.04 THOUSAND/UL (ref 0–0.2)
IMM GRANULOCYTES NFR BLD AUTO: 1 % (ref 0–2)
IRON SATN MFR SERPL: 12 % (ref 15–50)
IRON SERPL-MCNC: 41 UG/DL (ref 50–212)
LYMPHOCYTES # BLD AUTO: 1.32 THOUSANDS/ΜL (ref 0.6–4.47)
LYMPHOCYTES NFR BLD AUTO: 16 % (ref 14–44)
MCH RBC QN AUTO: 26.8 PG (ref 26.8–34.3)
MCHC RBC AUTO-ENTMCNC: 31.2 G/DL (ref 31.4–37.4)
MCV RBC AUTO: 86 FL (ref 82–98)
MONOCYTES # BLD AUTO: 0.62 THOUSAND/ΜL (ref 0.17–1.22)
MONOCYTES NFR BLD AUTO: 7 % (ref 4–12)
NEUTROPHILS # BLD AUTO: 6.34 THOUSANDS/ΜL (ref 1.85–7.62)
NEUTS SEG NFR BLD AUTO: 73 % (ref 43–75)
NRBC BLD AUTO-RTO: 0 /100 WBCS
PLATELET # BLD AUTO: 269 THOUSANDS/UL (ref 149–390)
PMV BLD AUTO: 12 FL (ref 8.9–12.7)
POTASSIUM SERPL-SCNC: 4.3 MMOL/L (ref 3.5–5.3)
PROT SERPL-MCNC: 7.5 G/DL (ref 6.4–8.4)
RBC # BLD AUTO: 5.03 MILLION/UL (ref 3.88–5.62)
SODIUM SERPL-SCNC: 139 MMOL/L (ref 135–147)
TIBC SERPL-MCNC: 345.8 UG/DL (ref 250–450)
TRANSFERRIN SERPL-MCNC: 247 MG/DL (ref 203–362)
UIBC SERPL-MCNC: 305 UG/DL (ref 155–355)
VIT B12 SERPL-MCNC: 733 PG/ML (ref 180–914)
WBC # BLD AUTO: 8.54 THOUSAND/UL (ref 4.31–10.16)

## 2024-12-19 PROCEDURE — 83550 IRON BINDING TEST: CPT

## 2024-12-19 PROCEDURE — 82746 ASSAY OF FOLIC ACID SERUM: CPT

## 2024-12-19 PROCEDURE — 82607 VITAMIN B-12: CPT

## 2024-12-19 PROCEDURE — 36415 COLL VENOUS BLD VENIPUNCTURE: CPT

## 2024-12-19 PROCEDURE — 82728 ASSAY OF FERRITIN: CPT | Performed by: NURSE PRACTITIONER

## 2024-12-19 PROCEDURE — 83540 ASSAY OF IRON: CPT

## 2024-12-19 PROCEDURE — 80053 COMPREHEN METABOLIC PANEL: CPT

## 2024-12-19 PROCEDURE — 85025 COMPLETE CBC W/AUTO DIFF WBC: CPT

## 2024-12-19 NOTE — PROGRESS NOTES
HCC coding opportunities          Chart Reviewed number of suggestions sent to Provider: 1     Patients Insurance  I13.0   Medicare Insurance: [employer] Advantage

## 2024-12-20 ENCOUNTER — OFFICE VISIT (OUTPATIENT)
Dept: FAMILY MEDICINE CLINIC | Facility: CLINIC | Age: 79
End: 2024-12-20
Payer: COMMERCIAL

## 2024-12-20 ENCOUNTER — TELEPHONE (OUTPATIENT)
Age: 79
End: 2024-12-20

## 2024-12-20 ENCOUNTER — RESULTS FOLLOW-UP (OUTPATIENT)
Dept: CARDIOLOGY CLINIC | Facility: CLINIC | Age: 79
End: 2024-12-20

## 2024-12-20 ENCOUNTER — APPOINTMENT (OUTPATIENT)
Dept: WOUND CARE | Facility: HOSPITAL | Age: 79
End: 2024-12-20
Payer: COMMERCIAL

## 2024-12-20 VITALS
BODY MASS INDEX: 29.16 KG/M2 | HEIGHT: 74 IN | WEIGHT: 227.2 LBS | HEART RATE: 64 BPM | SYSTOLIC BLOOD PRESSURE: 124 MMHG | DIASTOLIC BLOOD PRESSURE: 62 MMHG | RESPIRATION RATE: 17 BRPM | TEMPERATURE: 97.5 F

## 2024-12-20 DIAGNOSIS — Z95.0 S/P PLACEMENT OF CARDIAC PACEMAKER: Primary | ICD-10-CM

## 2024-12-20 DIAGNOSIS — D50.9 IRON DEFICIENCY ANEMIA, UNSPECIFIED IRON DEFICIENCY ANEMIA TYPE: ICD-10-CM

## 2024-12-20 DIAGNOSIS — N18.32 STAGE 3B CHRONIC KIDNEY DISEASE (HCC): ICD-10-CM

## 2024-12-20 DIAGNOSIS — L97.525 DIABETIC ULCER OF LEFT FOOT ASSOCIATED WITH TYPE 2 DIABETES MELLITUS, WITH MUSCLE INVOLVEMENT WITHOUT EVIDENCE OF NECROSIS, UNSPECIFIED PART OF FOOT (HCC): ICD-10-CM

## 2024-12-20 DIAGNOSIS — I25.10 CORONARY ARTERY DISEASE INVOLVING NATIVE CORONARY ARTERY OF NATIVE HEART WITHOUT ANGINA PECTORIS: ICD-10-CM

## 2024-12-20 DIAGNOSIS — I10 PRIMARY HYPERTENSION: ICD-10-CM

## 2024-12-20 DIAGNOSIS — I50.32 CHRONIC HEART FAILURE WITH PRESERVED EJECTION FRACTION (HFPEF) (HCC): ICD-10-CM

## 2024-12-20 DIAGNOSIS — N17.9 ACUTE KIDNEY INJURY SUPERIMPOSED ON CHRONIC KIDNEY DISEASE  (HCC): ICD-10-CM

## 2024-12-20 DIAGNOSIS — E53.8 VITAMIN B12 DEFICIENCY: ICD-10-CM

## 2024-12-20 DIAGNOSIS — E11.621 DIABETIC ULCER OF LEFT FOOT ASSOCIATED WITH TYPE 2 DIABETES MELLITUS, WITH MUSCLE INVOLVEMENT WITHOUT EVIDENCE OF NECROSIS, UNSPECIFIED PART OF FOOT (HCC): ICD-10-CM

## 2024-12-20 DIAGNOSIS — I10 PRIMARY HYPERTENSION: Primary | ICD-10-CM

## 2024-12-20 DIAGNOSIS — D50.0 IRON DEFICIENCY ANEMIA DUE TO CHRONIC BLOOD LOSS: ICD-10-CM

## 2024-12-20 DIAGNOSIS — N18.9 ACUTE KIDNEY INJURY SUPERIMPOSED ON CHRONIC KIDNEY DISEASE  (HCC): ICD-10-CM

## 2024-12-20 DIAGNOSIS — E53.8 FOLATE DEFICIENCY: ICD-10-CM

## 2024-12-20 LAB — FERRITIN SERPL-MCNC: 64 NG/ML (ref 24–336)

## 2024-12-20 PROCEDURE — 36415 COLL VENOUS BLD VENIPUNCTURE: CPT | Performed by: NURSE PRACTITIONER

## 2024-12-20 PROCEDURE — 99496 TRANSJ CARE MGMT HIGH F2F 7D: CPT | Performed by: NURSE PRACTITIONER

## 2024-12-20 NOTE — TELEPHONE ENCOUNTER
Spoke to Rayna at [facility] - she will be sending over high priority message to the providers and get back back to us. Neph clearance letter sent 12/20/24

## 2024-12-20 NOTE — TELEPHONE ENCOUNTER
"[facility] called in regards to patient needing nephrology medical clearance.    States \"was just hospitalized and was ordered another angiogram and that needs medical clearance\"    States \"the clearance is in epic and we are hoping to scheduled the patient for 12/26/25. He is a level 2 so he needs to get this done asap\"    Please call [facility] to discuss as it is so close to anticipated appointment.  [phone]      "

## 2024-12-20 NOTE — ASSESSMENT & PLAN NOTE
Managed by cardiologist and on torsemide and jardiance  Wt Readings from Last 3 Encounters:   12/20/24 103 kg (227 lb 3.2 oz)   12/17/24 101 kg (222 lb)   12/11/24 107 kg (236 lb)

## 2024-12-20 NOTE — ASSESSMENT & PLAN NOTE
Creatinine went up and torsemide dosage changed by cardiologist and will do torsemide 10 mg and 20 mg alternatively and will repeat BMP in a week  Lab Results   Component Value Date    EGFR 37 12/19/2024    EGFR 39 12/17/2024    EGFR 48 12/16/2024    CREATININE 1.69 (H) 12/19/2024    CREATININE 1.61 (H) 12/17/2024    CREATININE 1.35 (H) 12/16/2024

## 2024-12-20 NOTE — PROGRESS NOTES
Transition of Care Visit  Name: Thomas Horne      : 1945      MRN: [id number]  Encounter Provider: APOLLO oHff  Encounter Date: 2024   Encounter department: [facility]  Chief Complaint   Patient presents with   • Transition of Care Management     Dayami Maravilla LPN         Assessment & Plan  S/P placement of cardiac pacemaker  Doing well and denies any concerns       Diabetic ulcer of left foot associated with type 2 diabetes mellitus, with muscle involvement without evidence of necrosis, unspecified part of foot (HCC)  Managed by wound center  Lab Results   Component Value Date    HGBA1C 6.2 (H) 10/14/2024          Primary hypertension  Stable       Coronary artery disease involving native coronary artery of native heart without angina pectoris  Management as per cardiologist       Stage 3b chronic kidney disease (HCC)  Creatinine went up and torsemide dosage changed by cardiologist and will do torsemide 10 mg and 20 mg alternatively and will repeat BMP in a week  Lab Results   Component Value Date    EGFR 37 2024    EGFR 39 2024    EGFR 48 2024    CREATININE 1.69 (H) 2024    CREATININE 1.61 (H) 2024    CREATININE 1.35 (H) 2024          Acute on chronic HFpEF/Moderate pHTN (HFpEF) (HCC)  Managed by cardiologist and on torsemide and jardiance  Wt Readings from Last 3 Encounters:   24 103 kg (227 lb 3.2 oz)   24 101 kg (222 lb)   24 107 kg (236 lb)                  Iron deficiency anemia, unspecified iron deficiency anemia type  Managed by hematologist and appt with hematologist next week            History of Present Illness     Transitional Care Management Review:   Thomas Horne is a 79 y.o. male here for TCM follow up.     During the TCM phone call patient stated:  TCM Call     Date and time call was made  2024  4:00 PM    Hospital care reviewed  Records reviewed    Patient was hospitialized at  [facility]     Date of Admission  12/12/24    Date of discharge  12/17/24    Diagnosis  Bradycardia    Disposition  Home    Were the patients medications reviewed and updated  No  He declined but states that he knows to call if any questions    Current Symptoms  Shortness of breath  This is very mild and not a new symptom, per patient    Shortness of breath severity  Mild      TCM Call     Post hospital issues  None    Should patient be enrolled in anticoag monitoring?  No    Scheduled for follow up?  Yes    Patients specialists  Cardiologist    Cardiologist name  Naun Bell MD (Cardiology)    Other specialists names  Podatrist    Other specialists contcat #  Sleep Lab    Did you obtain your prescribed medications  Yes    Do you need help managing your prescriptions or medications  No    Is transportation to your appointment needed  No    Specify why  He is not cleared to drive    I have advised the patient to call PCP with any new or worsening symptoms  Orlin Rutledge    Living Arrangements  Children; Family members    Support System  Family    The type of support provided  Physical; Emotional    Do you have social support  Yes, as much as I need    Are you recieving any outpatient services  Yes    What type of services  Wound Care    Are you recieving home care services  No    Types of home care services  Nurse visit    Are you using any community resources  No    Current waiver services  No    Have you fallen in the last 12 months  No    Interperter language line needed  No    Counseling  Patient    Counseling topics  Activities of daily living; Importance of RX compliance; patient and family education; instructions for management; Risk factor reduction    Comments  Thomas states that he is doing well. He lives with his daughter/grand daughter and has a good support system. He knows to go to the ER if any chest pain, dypsnea, and call cardiology if any dizziness, palpitations, etc [email or web address]        Patient is here to follow up  "after recent hospitalization.  Patient had pacemaker placement during hospitalization.  Stated that feeling much better  Denies chest pain, sob, headache and dizziness  Stated that will be going back to wound care on 12/24/24 and visiting nurses are being set up by wound care and doing his dressings at this time  Seems that vascular is requesting clearance from nephrologist, prior to proceeding with abdominal angiogram with RLE runoff and possible intervention vs diagnostic only.        Review of Systems   HENT: Negative.     Respiratory: Negative.     Cardiovascular: Negative.    Gastrointestinal: Negative.    Musculoskeletal:  Positive for arthralgias.   Skin:  Positive for wound.   Neurological: Negative.      Objective   /62   Pulse 64   Temp 97.5 °F (36.4 °C)   Resp 17   Ht 6' 2\" (1.88 m)   Wt 103 kg (227 lb 3.2 oz)   BMI 29.17 kg/m²     Physical Exam  HENT:      Head: Normocephalic.   Eyes:      Conjunctiva/sclera: Conjunctivae normal.   Cardiovascular:      Rate and Rhythm: Normal rate and regular rhythm.      Pulses: Normal pulses.      Heart sounds: Normal heart sounds.   Pulmonary:      Effort: Pulmonary effort is normal.      Breath sounds: Normal breath sounds.   Skin:     General: Skin is warm and dry.             Comments: Left foot dressing is Clean, dry and intact   Neurological:      Mental Status: He is alert and oriented to person, place, and time.   Psychiatric:         Mood and Affect: Mood normal.         Behavior: Behavior normal.         Thought Content: Thought content normal.         Judgment: Judgment normal.       Medications have been reviewed by provider in current encounter      "

## 2024-12-20 NOTE — PATIENT INSTRUCTIONS
Patient Education     Pacemaker Insertion Discharge Instructions   About this topic   A pacemaker helps your heart to beat properly. A pacemaker is used when the heart does not beat normally. This is called an arrhythmia. Your heart may be beating too fast or too slow. Also, your heart may beat with an irregular rhythm. Any of these arrhythmias can affect your health if not treated right away.  The pacemaker is placed under the skin of your chest, just below your collarbone. Leads or wires are attached to the pacemaker. The leads will be hooked up to your heart to help control your heartbeat. Pacemakers work in many ways. Your doctor will decide which settings your pacemaker will need. Some send an electric pulse for each heartbeat. Others only send an electric pulse if the heart rate is too high or too low. A pacemaker is made up of two parts:  Pulse generator ? Houses the battery and a small computer that records the heartbeat  Lead wires ? Send the electric pulses from the generator to the heart  Your doctor may place leads in one part of the heart or in more than one place, based on the cause of your rhythm problem. Talk to your doctor about where they will place your leads.       What care is needed at home?   Ask your doctor what you need to do when you go home. Make sure you ask questions if you do not understand what the doctor says. This way you will know what you need to do.  Talk to your doctor about how to care for your cut site. Ask your doctor about:  When you should change your bandages  How to care for your cut sites  When you may take a bath or shower  If you need to limit your arm movement or wear a sling on the side where the device was placed  If you should learn to take your heart rate and blood pressure  To help others in case of an emergency:  Wear a disease medical alert ID. This will let other people know that you have a pacemaker.  Always carry your medical card. This card has information  about your pacemaker. It will be able to let other people know what to do in an emergency.  Be careful around electrical devices or anything with magnets.  Talk with your doctor about which electric or magnetic sources or equipment are safe to be around. Learn which ones you must avoid.  Take extra care with cell phones and devices like smart watches. They may have a magnet that can affect your device. To be safe, keep these things away from your pacemaker. Carry them in a pocket or bag below your waist. Do not sleep with them on your bed.  Most appliances in your home are safe for you to use.  Check with your doctor about metal detectors that you walk through. You may be able to walk through them at a normal pace. Your pacemaker will likely set it off. Show your medical alert ID or card. You can also ask to be searched by hand.  Stay at least 2 feet (0.6 meters) away from industrial welders, large motors, electrical generators, and equipment.  Tell your other doctors about your pacemaker before having any other tests, like an MRI, or procedures.  What follow-up care is needed?   Your doctor may ask you to make visits to the office to check on your progress. Be sure to keep these visits. Your doctor may order you to have an ECG (electrocardiogram) to check electrical pulses of your heart. Your doctor will also want to check:  Your pacemaker. This is to make sure it is working properly.  The pacemaker batteries. The batteries will need to be replaced before they start to run down. The batteries may last for years, based on how much your device is used. Leads or wires may also need to be replaced over time.  If you have stitches or staples, you will need to have them taken out. Your doctor will often want to do this in 1 to 2 weeks. Ask your doctor when the stitches or staples need to come out. If the doctor used skin glue, the glue will fall off on its own.  What drugs may be needed?   The doctor may order drugs to  prevent infection. Your doctor will tell you about the drugs you will need to take. Be sure to ask about them. Take all your drugs as ordered by your doctor.  Will physical activity be limited?   You may have to limit your activity until the cut site is healed and the wires are securely in place. Talk to your doctor about:  The right amount of activity for you. Ask when you may begin light sports and workouts or other tiring activities. Do not play full contact sports such as football. This could damage your pacemaker or may loosen the wires connected to your heart.  How far you may raise your arm on the side of your body with the device. There will be a time when you are not allowed to reach over your head, out to the side, or do stretching on the side of your body with the device. You may be asked to wear a sling.  How much weight you may lift.  What problems could happen?   Bleeding  Damage to a blood vessel or nerve  A collapsed lung  Infection  Device may not work the right way or becomes dislodged  When do I need to call the doctor?   Signs of wound infection. These include swelling, redness, warmth around the wound; too much pain when touched; yellowish, greenish, or bloody discharge; foul smell coming from the cut site; cut site opens up.  The same signs you had before your pacemaker was placed  Dizziness or shortness of breath. Call for emergency help right away.  Chest pain. Call for emergency help right away.  Hiccups that do not go away  Fainting or passing out. Call for emergency help right away.  You are not feeling better in 2 to 3 days or you are feeling worse  Helpful tips   Be to keep your ID card with your pacemaker information in a safe place. It will include the , serial number, and date the implant was put in. Also write down your pacemaker settings on this card. This information is helpful in case of an emergency.  Teach Back: Helping You Understand   The Teach Back Method helps  you understand the information we are giving you. After you talk with the staff, tell them in your own words what you learned. This helps to make sure the staff has described each thing clearly. It also helps to explain things that may have been confusing. Before going home, make sure you can do these:  I can tell you about my procedure.  I can tell you how to care for my cut site.  I can tell you how I need to be careful around electrical devices and magnets with my pacemaker.  I can tell you what signs will make me call for emergency help.  Last Reviewed Date   2021-02-10  Consumer Information Use and Disclaimer   This generalized information is a limited summary of diagnosis, treatment, and/or medication information. It is not meant to be comprehensive and should be used as a tool to help the user understand and/or assess potential diagnostic and treatment options. It does NOT include all information about conditions, treatments, medications, side effects, or risks that may apply to a specific patient. It is not intended to be medical advice or a substitute for the medical advice, diagnosis, or treatment of a health care provider based on the health care provider's examination and assessment of a patient’s specific and unique circumstances. Patients must speak with a health care provider for complete information about their health, medical questions, and treatment options, including any risks or benefits regarding use of medications. This information does not endorse any treatments or medications as safe, effective, or approved for treating a specific patient. [employer] and its affiliates disclaim any warranty or liability relating to this information or the use thereof. The use of this information is governed by the Terms of Use, available at https://www.[employer].com/en/know/clinical-effectiveness-terms   Copyright   Copyright © 2024 [employer] and its affiliates and/or licensors. All rights  reserved.

## 2024-12-21 ENCOUNTER — TELEPHONE (OUTPATIENT)
Dept: OTHER | Facility: HOSPITAL | Age: 79
End: 2024-12-21

## 2024-12-21 NOTE — TELEPHONE ENCOUNTER
I called the patient and we spoke over the phone.   Recent laboratory data were reviewed.     Lab Results   Component Value Date    SODIUM 139 12/19/2024    K 4.3 12/19/2024     12/19/2024    CO2 27 12/19/2024    BUN 42 (H) 12/19/2024    CREATININE 1.69 (H) 12/19/2024    GLUC 189 (H) 12/19/2024    CALCIUM 9.9 12/19/2024     Renal function is stable from the hospital.   Baseline creatinine is 1.6 to 1.8.   He is on Torsemide 20 mg daily but was told to cut back to 10 mg alt with 20 mg daily because his creatinine is higher.   Losartan 25 mg daily was started in the hospital.     Plan:  Informed patient that it is okay to stay on Torsemide 20 mg daily since his kidney function is actually stable.   He will check labs after gabriela.   If renal function is stable, I will clear him for angiogram.   Advised to hold Torsemide and Losartan on the day of the procedure.

## 2024-12-22 DIAGNOSIS — Z79.4 TYPE 2 DIABETES MELLITUS WITH DIABETIC PERIPHERAL ANGIOPATHY WITHOUT GANGRENE, WITH LONG-TERM CURRENT USE OF INSULIN (HCC): ICD-10-CM

## 2024-12-22 DIAGNOSIS — E78.2 MIXED HYPERLIPIDEMIA: ICD-10-CM

## 2024-12-22 DIAGNOSIS — E11.51 TYPE 2 DIABETES MELLITUS WITH DIABETIC PERIPHERAL ANGIOPATHY WITHOUT GANGRENE, WITH LONG-TERM CURRENT USE OF INSULIN (HCC): ICD-10-CM

## 2024-12-23 ENCOUNTER — OFFICE VISIT (OUTPATIENT)
Dept: CARDIOLOGY CLINIC | Facility: CLINIC | Age: 79
End: 2024-12-23
Payer: COMMERCIAL

## 2024-12-23 ENCOUNTER — OFFICE VISIT (OUTPATIENT)
Dept: HEMATOLOGY ONCOLOGY | Facility: MEDICAL CENTER | Age: 79
End: 2024-12-23
Payer: COMMERCIAL

## 2024-12-23 VITALS
SYSTOLIC BLOOD PRESSURE: 132 MMHG | WEIGHT: 229 LBS | BODY MASS INDEX: 29.39 KG/M2 | RESPIRATION RATE: 17 BRPM | OXYGEN SATURATION: 100 % | HEIGHT: 74 IN | DIASTOLIC BLOOD PRESSURE: 72 MMHG | TEMPERATURE: 97.6 F | HEART RATE: 58 BPM

## 2024-12-23 VITALS
HEIGHT: 74 IN | SYSTOLIC BLOOD PRESSURE: 130 MMHG | HEART RATE: 79 BPM | BODY MASS INDEX: 29.52 KG/M2 | OXYGEN SATURATION: 97 % | DIASTOLIC BLOOD PRESSURE: 70 MMHG | WEIGHT: 230 LBS

## 2024-12-23 DIAGNOSIS — E11.51 TYPE 2 DIABETES MELLITUS WITH DIABETIC PERIPHERAL ANGIOPATHY WITHOUT GANGRENE, WITH LONG-TERM CURRENT USE OF INSULIN (HCC): ICD-10-CM

## 2024-12-23 DIAGNOSIS — I82.452 ACUTE DEEP VEIN THROMBOSIS (DVT) OF LEFT PERONEAL VEIN (HCC): ICD-10-CM

## 2024-12-23 DIAGNOSIS — Z79.4 TYPE 2 DIABETES MELLITUS WITH DIABETIC PERIPHERAL ANGIOPATHY WITHOUT GANGRENE, WITH LONG-TERM CURRENT USE OF INSULIN (HCC): ICD-10-CM

## 2024-12-23 DIAGNOSIS — Z95.1 HX OF CABG: ICD-10-CM

## 2024-12-23 DIAGNOSIS — I20.89 STABLE ANGINA PECTORIS (HCC): ICD-10-CM

## 2024-12-23 DIAGNOSIS — I35.0 NONRHEUMATIC AORTIC VALVE STENOSIS: ICD-10-CM

## 2024-12-23 DIAGNOSIS — D50.0 IRON DEFICIENCY ANEMIA DUE TO CHRONIC BLOOD LOSS: ICD-10-CM

## 2024-12-23 DIAGNOSIS — Z95.0 S/P PLACEMENT OF CARDIAC PACEMAKER: ICD-10-CM

## 2024-12-23 DIAGNOSIS — E53.8 FOLATE DEFICIENCY: ICD-10-CM

## 2024-12-23 DIAGNOSIS — I25.10 CORONARY ARTERY DISEASE INVOLVING NATIVE CORONARY ARTERY OF NATIVE HEART WITHOUT ANGINA PECTORIS: ICD-10-CM

## 2024-12-23 DIAGNOSIS — E53.8 VITAMIN B12 DEFICIENCY: ICD-10-CM

## 2024-12-23 DIAGNOSIS — D64.9 SYMPTOMATIC ANEMIA: Primary | ICD-10-CM

## 2024-12-23 DIAGNOSIS — R06.02 SOB (SHORTNESS OF BREATH): ICD-10-CM

## 2024-12-23 DIAGNOSIS — I49.3 FREQUENT PVCS: ICD-10-CM

## 2024-12-23 DIAGNOSIS — N18.32 STAGE 3B CHRONIC KIDNEY DISEASE (HCC): ICD-10-CM

## 2024-12-23 PROCEDURE — 99214 OFFICE O/P EST MOD 30 MIN: CPT | Performed by: PHYSICIAN ASSISTANT

## 2024-12-23 PROCEDURE — 99214 OFFICE O/P EST MOD 30 MIN: CPT | Performed by: INTERNAL MEDICINE

## 2024-12-23 RX ORDER — EMPAGLIFLOZIN 25 MG/1
25 TABLET, FILM COATED ORAL DAILY
Qty: 90 TABLET | Refills: 1 | Status: SHIPPED | OUTPATIENT
Start: 2024-12-23

## 2024-12-23 NOTE — PROGRESS NOTES
Progress Note - Cardiology Office  [facility]    Thomas Horne 79 y.o. male MRN: [id number]  : 1945  Encounter: [id number]      Assessment:     1. SOB (shortness of breath)    2. Coronary artery disease involving native coronary artery of native heart without angina pectoris    3. Status post Medtronic dual-chamber pacemaker    4. Nonrheumatic aortic valve stenosis    5. Stable angina pectoris (HCC)    6. Frequent PVCs    7. Type 2 diabetes mellitus with diabetic peripheral angiopathy without gangrene, with long-term current use of insulin ([facility])    8. Stage 3b chronic kidney disease ([facility])    9. Hx of CABG        Discussion Summary and Plan:    1.  Coronary artery disease postcoronary artery bypass surgery with three-vessel bypass patient with history of three-vessel bypass.  His last cath in 2022 shows only patent LIMA to LAD.  Proximal % occluded, proximal % occluded.  Vein graft to % occluded.  Obtuse marginal 2. 100% occluded.  Vein graft to OM 2, 100% occluded.  AV groove circumflex is widely patent which feeds posterior lateral branches.  Patient has grade 1 to grade 2 collaterals to right circulation from the left circulation.  EF is 50%.  Patient can continue aspirin and Plavix.  One of the antiplatelet agent can be stopped after 2025.  He can be on Plavix after that      2.  Hypertensive heart disease.  Patient blood pressure acceptable currently he is on losartan 25, Toprol- mg daily amlodipine 2.5 and Jardiance and torsemide 20 mg daily.  Repeat BMP ordered.    3.  Chronic stable angina.  Currently he get exertional symptoms only.  Continue aspirin and Plavix along with Imdur and Ranexa.  Symptoms of angina has improved.  Feels well with no symptoms of chest pain.  Aspirin can be stopped after 2025.    4.  Diabetes mellitus.  On multiple medications.  Management as per medical team.    5.  Mixed hyperlipidemia.  Continue  statins and fenofibrate.  Cholesterol profile has improved.    6.  S/p AAA repair management as per vascular    7.  CKD stage III..  Serum creatinine has been stable.  He follows with nephrology.  Creatinine now 1.6-2.0.  Patient is also on Jardiance.  Repeat BMP ordered    8.  Obesity with a BMI around 29.5 as he has lost weight.    9.  Obstructive sleep apnea.  Patient has history of obstructive sleep apnea could not tolerate CPAP machine does not want to use it.    10.  Pulmonary hypertension.  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  He is asymptomatic does not use CPAP machine we will continue to monitor.    11.  Sick sinus syndrome with episodes of bradycardia and frequent PVCs.  Patient had a pacemaker placed as needed beta-blocker currently on Toprol- mg daily.  Number PVC will be checked on the pacemaker.  Patient has Medtronic dual-chamber pacemaker.  Pacemaker site is healing well.    Continue current Rx follow-up 6 months.          Patient  was advised and educated to call our office  immediately if  patient has any new symptoms of chest pain/shortness of breath, near-syncope, syncope, light headedness sustained palpitations  or any other cardiovascular symptoms before their scheduled follow-up appointment.  Office number was provided #[phone].  Please call [phone] if any questions.  Counseling :  A description of the counseling.  Goals and Barriers.  Patient's ability to self care: Yes  Medication side effect reviewed with patient in detail and all their questions answered to their satisfaction.    HPI :     Thomas Horne is a 79 y.o. year old male who came for follow up. Patient has medical history significant for coronary artery disease s/p CABG in 2003 with three-vessel bypass which was LIMA to LAD, vein graft to RCA and vein graft to circumflex.  He has lost his vein graft to RCA  and circumflex.  His LIMA to LAD is widely patent.  Cardiac catheterization also shows severe three-vessel disease with occlusion of obtuse marginal 1 vein graft, and RCA is 100% occluded and LAD is proximally 100% occluded.  He had a stent which is widely patent in the circumflex.  His EKG has shown sinus rhythm with heart rate 80 bpm in March 2022.  He has history of chronic stable angina, type 2 diabetes mellitus, dyslipidemia, CRI.  Recently he lost his wife.  He is otherwise doing well.  He lives alone and his family member help take care of him.  He does have history of abdominal aortic aneurysm and PVD s/p repair.  His med list reviewed.  No nausea no vomiting no fever no chills he has been doing well today.  His last blood test was on 6/1/2023 which shows his creatinine 1.63 his cholesterol profile is acceptable vitals are stable and heart rate is 76 bpm.    Past surgical history is also significant for abdominal aortic aneurysm repair, gallbladder surgery, history of prostate cancer s/p prostatectomy.  No recent surgery.    Echo as well as his cath report and images reviewed with him.  To follow-up with vascular.  We will refer him back to him.    7/10/2023.    Above reviewed.  Patient came for follow-up.  His labs reviewed creatinine is around 2.  Echo done recently reviewed and found to have PA pressure around 50 to 60 mmHg.  He has a history of sleep apnea could not tolerate CPAP machine do not like to use it.  Otherwise she is feeling well.  Other risk as mentioned above.    10/17/2023.    Above reviewed.  Patient came for follow-up he is doing well.  He was put on Ranexa.  His med list was reviewed creatinine has improved to 1.63.  Cholesterol profile and other labs are acceptable his vitals are stable.  His weight has been stable.  No nausea no vomiting no fever no chills chronic shortness of breath not changed.  And he is feeling better.  He has been put on Trental by podiatry.  Other meds reviewed.   Nephrology note noted.  Serum creatinine has been stable.    12/23/2024.    Above reviewed.  Patient came for follow-up.  He was recently admitted to hospital with bradycardia with heart rate around 20-30.  Noted to have frequent PVCs.  He needed a dual-chamber pacemaker.  He has a Medtronic pacemaker with LBBB lead.  He did very well.  He came for follow-up his BUN/creatinine has been stable.  His labs from 12/19/2024 reviewed.  Vitals are stable pacemaker site is healing well.  His med list reviewed currently amlodipine 2.5 mg daily, atorvastatin 40 mg daily, Plavix 75 daily, Jardiance 25, fenofibrate, isosorbide Imdur 30 mg daily, losartan 25 and metoprolol  mg daily.  He is also on Ranexa and torsemide.  He denies any new complaints.    Review of Systems   Constitutional:  Negative for activity change, chills, diaphoresis, fever and unexpected weight change.   HENT:  Negative for congestion.    Eyes:  Negative for discharge and redness.   Respiratory:  Positive for shortness of breath. Negative for cough, chest tightness and wheezing.         Shortness of breath has improved.   Cardiovascular: Negative.  Negative for chest pain, palpitations and leg swelling.   Gastrointestinal:  Negative for abdominal pain, diarrhea and nausea.   Endocrine: Negative.    Genitourinary:  Negative for decreased urine volume and urgency.   Musculoskeletal:  Positive for arthralgias and back pain. Negative for gait problem.   Skin:  Negative for rash and wound.        Pacemaker site is healing well.   Allergic/Immunologic: Negative.    Neurological:  Negative for dizziness, seizures, syncope, weakness, light-headedness and headaches.   Hematological: Negative.    Psychiatric/Behavioral:  Negative for agitation and confusion. The patient is nervous/anxious.        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)    • Plantar fasciitis    • Sleep apnea     Could not tolerate CPAP     Past Surgical History:   Procedure Laterality Date   • ABDOMINAL AORTIC ANEURYSM REPAIR      Stented   • ADENOIDECTOMY     • BACK SURGERY  1985   • CARDIAC CATHETERIZATION Left 10/19/2022    Procedure: Cardiac Left Heart Cath;  Surgeon: Danielle Pereira MD;  Location: [facility];  Service: Cardiology   • CARDIAC ELECTROPHYSIOLOGY PROCEDURE Left 12/16/2024    Procedure: Cardiac pacer implant;  Surgeon: Danielle Pereira MD;  Location: [facility];  Service: Cardiology   • CARDIAC SURGERY  2002    3 cardiac bypass then angioplasty 7/2020   • CHOLECYSTECTOMY     • COLONOSCOPY  02/14/2024   • CORONARY ARTERY BYPASS GRAFT     • IR LOWER EXTREMITY ANGIOGRAM  11/01/2023   • IR LOWER EXTREMITY ANGIOGRAM  08/07/2024   • LAMINECTOMY  1990   • NH BYPASS W/VEIN FEMORAL-POPLITEAL Right 11/16/2023    Procedure: BYPASS FEMORAL-POPLITEAL WITH CRYO VEIN, RIGHT FEMORAL ENDARTERECTOMY;  Surgeon: Vasquez Clark MD;  Location: [facility];  Service: Vascular   • NH BYPASS W/VEIN FEMORAL-POPLITEAL Left 8/30/2024    Procedure: left lower extremity above knee popliteal to below knee popliteal artery bypass with PTFE graft;  Surgeon: Vasquez Clark MD;  Location: [facility];  Service: Vascular   • NH SLCTV CATHJ 3RD+ ORD SLCTV ABDL PEL/LXTR BRNCH Right 11/01/2023    Procedure: ARTERIOGRAM Right lower extremity arteriogram with CO2 via right groin access;  Surgeon: Vasquez Clark MD;  Location:  [facility];  Service: Vascular   • IA SLCTV CATHJ 3RD+ ORD SLCTV ABDL PEL/LXTR BRNCH Left 2024    Procedure: diagnostic LLE Arteriogram;  Surgeon: Vasquez Clark MD;  Location: [facility];  Service: Vascular   • PROSTATE SURGERY     • TONSILLECTOMY     • URINARY SPHINCTER IMPLANT       Social History     Substance and Sexual Activity   Alcohol Use Yes   • Alcohol/week: 14.0 standard drinks of alcohol   • Types: 14 Cans of beer per week    Comment: stopped last few months     Social History     Substance and Sexual Activity   Drug Use Never     Social History     Tobacco Use   Smoking Status Former   • Current packs/day: 0.00   • Average packs/day: 1.5 packs/day for 35.0 years (52.5 ttl pk-yrs)   • Types: Cigarettes   • Start date: 1956   • Quit date:    • Years since quittin.0   • Passive exposure: Past   Smokeless Tobacco Never     Family History:   Family History   Problem Relation Age of Onset   • Diabetes Mother    • Alcohol abuse Father    • Heart disease Brother    • Cancer Sister         Thyroid   • Cancer Sister         Colon   • Cancer Brother         Throat   • Thyroid disease Brother    • Cancer Brother    • Diabetes Sister    • Thyroid disease Sister    • Mental illness Neg Hx        Meds/Allergies     Allergies   Allergen Reactions   • Lisinopril Rash and Lip Swelling       Current Outpatient Medications:   •  acetaminophen (TYLENOL) 325 mg tablet, Take 2 tablets (650 mg total) by mouth every 6 (six) hours as needed for mild pain, Disp: , Rfl:   •  amLODIPine (NORVASC) 2.5 mg tablet, Take 1 tablet (2.5 mg total) by mouth daily, Disp: 90 tablet, Rfl: 1  •  aspirin 81 mg chewable tablet, Chew 81 mg daily, Disp: , Rfl:   •  atorvastatin (LIPITOR) 40 mg tablet, TAKE 1 TABLET BY MOUTH DAILY, Disp: 90 tablet, Rfl: 1  •  Blood Glucose Monitoring Suppl (OneTouch Verio Reflect) w/Device KIT, Check blood sugars twice daily. Please substitute with appropriate alternative as covered  by patient's insurance. Dx: E11.65, Disp: 1 kit, Rfl: 0  •  clopidogrel (PLAVIX) 75 mg tablet, Take 1 tablet (75 mg total) by mouth daily, Disp: 30 tablet, Rfl: 3  •  Droplet Pen Needles 32G X 4 MM MISC, USE EVERY EVENING, Disp: 100 each, Rfl: 5  •  DULoxetine (CYMBALTA) 30 mg delayed release capsule, Take 1 capsule (30 mg total) by mouth daily, Disp: 90 capsule, Rfl: 0  •  Empagliflozin (Jardiance) 25 MG TABS, TAKE 1 TABLET BY MOUTH DAILY, Disp: 90 tablet, Rfl: 1  •  fenofibrate 160 MG tablet, TAKE 1 TABLET BY MOUTH DAILY, Disp: 100 tablet, Rfl: 1  •  insulin glargine (LANTUS) 100 units/mL subcutaneous injection, Inject 15 Units under the skin daily at bedtime, Disp: 10 mL, Rfl: 0  •  isosorbide mononitrate (IMDUR) 30 mg 24 hr tablet, TAKE 3 TABLETS BY MOUTH DAILY, Disp: 270 tablet, Rfl: 1  •  losartan (COZAAR) 25 mg tablet, Take 1 tablet (25 mg total) by mouth daily, Disp: 90 tablet, Rfl: 1  •  metFORMIN (GLUCOPHAGE) 500 mg tablet, TAKE 1 TABLET BY MOUTH TWICE  DAILY WITH MEALS, Disp: 180 tablet, Rfl: 1  •  metoprolol succinate (TOPROL-XL) 100 mg 24 hr tablet, Take 1 tablet (100 mg total) by mouth daily, Disp: 90 tablet, Rfl: 1  •  Multiple Vitamins-Minerals (MULTIVITAMIN MEN 50+ PO), Take by mouth daily, Disp: , Rfl:   •  Omega-3 Fatty Acids (fish oil) 1,000 mg, Take 4,000 mg by mouth 2 (two) times a day, Disp: , Rfl:   •  OneTouch Delica Lancets 33G MISC, Check blood sugars twice daily. Please substitute with appropriate alternative as covered by patient's insurance. Dx: E11.65, Disp: 200 each, Rfl: 3  •  pantoprazole (PROTONIX) 40 mg tablet, TAKE 1 TABLET BY MOUTH DAILY, Disp: 90 tablet, Rfl: 1  •  pentoxifylline (TRENtal) 400 mg ER tablet, TAKE 1 TABLET BY MOUTH 3 TIMES  DAILY WITH MEALS, Disp: 270 tablet, Rfl: 3  •  pregabalin (LYRICA) 150 mg capsule, Take 1 capsule (150 mg total) by mouth 3 (three) times a day, Disp: 270 capsule, Rfl: 0  •  ranolazine (RANEXA) 1000 MG SR tablet, Take 1 tablet (1,000 mg  "total) by mouth 2 (two) times a day, Disp: 180 tablet, Rfl: 3  •  sitaGLIPtin (JANUVIA) 25 mg tablet, Take 1 tablet (25 mg total) by mouth daily, Disp: 30 tablet, Rfl: 0  •  torsemide (DEMADEX) 20 mg tablet, TAKE 1 TABLET BY MOUTH DAILY, Disp: 90 tablet, Rfl: 1  •  nitroglycerin (NITROSTAT) 0.4 mg SL tablet, Place 1 tablet (0.4 mg total) under the tongue every 5 (five) minutes as needed for chest pain, Disp: 30 tablet, Rfl: 0  •  sodium hypochlorite (DAKIN'S HALF-STRENGTH) external solution, Apply 1 Application topically every other day At each dressing change (Patient not taking: Reported on 12/20/2024), Disp: 473 mL, Rfl: 2    Vitals: Blood pressure 130/70, pulse 79, height 6' 2\" (1.88 m), weight 104 kg (230 lb), SpO2 97%.    Body mass index is 29.53 kg/m².  Wt Readings from Last 3 Encounters:   12/23/24 104 kg (230 lb)   12/23/24 104 kg (229 lb)   12/20/24 103 kg (227 lb 3.2 oz)     Vitals:    12/23/24 1043   Weight: 104 kg (230 lb)     BP Readings from Last 3 Encounters:   12/23/24 130/70   12/23/24 132/72   12/20/24 124/62       Physical Exam  Constitutional:       General: He is not in acute distress.     Appearance: He is well-developed. He is not diaphoretic.   Neck:      Thyroid: No thyromegaly.      Vascular: No JVD.      Trachea: No tracheal deviation.   Cardiovascular:      Rate and Rhythm: Normal rate and regular rhythm.      Heart sounds: S1 normal and S2 normal. Heart sounds not distant. Murmur heard.      Systolic (ejection) murmur is present with a grade of 2/6.      No friction rub. No gallop. No S3 or S4 sounds.   Pulmonary:      Effort: Pulmonary effort is normal. No respiratory distress.      Breath sounds: Normal breath sounds. No wheezing or rales.   Chest:      Chest wall: No tenderness.   Abdominal:      General: Bowel sounds are normal. There is no distension.      Palpations: Abdomen is soft.      Tenderness: There is no abdominal tenderness.   Musculoskeletal:         General: No " deformity.      Cervical back: Neck supple.   Skin:     General: Skin is warm and dry.      Coloration: Skin is not pale.      Findings: No rash.   Neurological:      Mental Status: He is alert and oriented to person, place, and time.   Psychiatric:         Behavior: Behavior normal.         Judgment: Judgment normal.               Diagnostic Studies Review Cardio:    Patient has cardiac catheterization done on 2/1/2022 which shows that he has history of coronary artery with three-vessel bypass.  He has lost his vein graft to RCA, % occluded, vein graft to circumflex also occluded, stent placed in distal circumflex is widely patent, LIMA to LAD was widely patent and LAD proximally 100% occluded.    Echo Doppler.  Echo Doppler done in 7 6/26/2022 shows EF 50 to 55%, grade 1 diastolic dysfunction, left atrium mildly dilated, thickened aortic valve with mild aortic stenosis and mild MR and mild TR.  Aortic root is mildly dilated.    Echo Doppler.  Echo Doppler done 6/17/2023 shows mild to moderate left medical hypertrophy, EF 50%, grade 2 diastolic dysfunction, left atrium moderately dilated, thickened aortic valve with mild to moderate stenosis, mild tricuspid regurgitation with moderate to severely elevated pulmonary artery pressure 50 to 60 mmHg aortic root mildly dilated    EKG:    Results for orders placed during the hospital encounter of 02/16/22    NM Myocardial Perfusion Spect (Pharmacological Induced Stress and/or Rest)    Interpretation Summary  •  Stress Combined Conclusion: Left ventricular perfusion is abnormal. There is mild photon reduction in the anterior wall at stress.  Findings are consistent with ischemia. Additional area of infarct involving the mid and apical portion of the inferior wall.  •  Stress Function: Left ventricular function post-stress is abnormal. Global function is mildly reduced. There were no regional wall motion abnormalities during stress. Post-stress ejection fraction is  40 %.  •  Stress ECG: No ST deviation is noted. There were no arrhythmias during recovery. . The ECG was negative for ischemia.  •  Perfusion: There is a left ventricular perfusion defect that is small in size with severe reduction in uptake present in the mid to apical inferior location(s) that is fixed. There is a left ventricular perfusion defect that is small to medium in size with mild reduction in uptake present in the mid to apical anterior location(s) that is reversible.    XR chest 1 view portable    Result Date: 5/11/2023  Narrative: CHEST INDICATION:   elevated troponin. COMPARISON: Chest radiograph dated 3/1/2023. EXAM PERFORMED/VIEWS:  XR CHEST PORTABLE FINDINGS: Status post median sternotomy. Cardiomediastinal silhouette is stably enlarged. No focal airspace consolidation. No pneumothorax or pleural effusion. Osseous structures appear within normal limits for patient age.     Impression: No acute cardiopulmonary disease. Workstation performed: [id number]       Imaging:  Chest X-Ray:   XR chest pa & lateral    Result Date: 3/2/2023  Impression No acute cardiopulmonary disease. Workstation performed: [id number]       Lab Review   Lab Results   Component Value Date    WBC 8.54 12/19/2024    HGB 13.5 12/19/2024    HCT 43.3 12/19/2024    MCV 86 12/19/2024    RDW 14.0 12/19/2024     12/19/2024     BMP:  Lab Results   Component Value Date    SODIUM 139 12/19/2024    K 4.3 12/19/2024     12/19/2024    CO2 27 12/19/2024    BUN 42 (H) 12/19/2024    CREATININE 1.69 (H) 12/19/2024    GLUC 189 (H) 12/19/2024    GLUF 122 (H) 12/13/2024    CALCIUM 9.9 12/19/2024    CORRECTEDCA 9.7 09/06/2024    EGFR 37 12/19/2024    MG 2.0 12/17/2024     Troponins:    LFT:  Lab Results   Component Value Date    AST 16 12/19/2024    ALT 6 (L) 12/19/2024    ALKPHOS 41 12/19/2024    TP 7.5 12/19/2024    ALB 4.0 12/19/2024        Lab Results   Component Value Date    HGBA1C 6.2 (H) 10/14/2024     Lipid Profile:   Lab  "Results   Component Value Date    CHOLESTEROL 137 12/17/2024    HDL 35 (L) 12/17/2024    LDLCALC 66 12/17/2024    TRIG 178 (H) 12/17/2024     Lab Results   Component Value Date    CHOLESTEROL 137 12/17/2024    CHOLESTEROL 175 10/14/2024       Lab Results   Component Value Date    NTBNP 420 02/16/2022            Dr. Danielle Pereira MD [facility]        \"This note was completed in part utilizing [employer] direct voice recognition software.   Grammatical errors, random word insertion, spelling mistakes, and incomplete sentences may be an occasional consequence of the system secondary to software limitations, ambient noise and hardware issues.    Please read the chart carefully and recognize, using context, where substitutions have occurred.  If you have any questions or concerns about the context, text or information contained within the body of this dictation, please contact myself, the provider, for further clarification.\"  "

## 2024-12-23 NOTE — ASSESSMENT & PLAN NOTE
Continue oral iron 1 tablet every other day as above.  Orders:  •  Iron Panel (Includes Ferritin, Iron Sat%, Iron, and TIBC); Future  •  Comprehensive metabolic panel; Future

## 2024-12-23 NOTE — ASSESSMENT & PLAN NOTE
Patient with recent finding of acute nonocclusive DVT in the gastrocnemius and peroneal veins in the left lower extremity. ?Provoked by hospital stay from 4/8 through 4/11/2024 for SOB.  He also has distant history of DVT.  Details are unclear.     He was placed on Coumadin on 4/18/2024.  This was being managed by his PCP. This was discontinued by his PCP in early September 2024.    He is following with vascular.  He had repeat venous duplex on 12/13/2024.  This showed no evidence of acute or chronic DVT in either lower extremity.

## 2024-12-23 NOTE — PROGRESS NOTES
Name: Thomas Horne      : 1945      MRN: [id number]  Encounter Provider: Karma Melissa PA-C  Encounter Date: 2024   Encounter department: [facility]  :  Assessment & Plan  Symptomatic anemia  Patient was evaluated in follow-up of anemia, likely secondary to chronic blood loss along with Stage III CKD.     Patient with iron deficiency anemia with history of GI bleeding. He had recent EGD (4/10/2024) and colonoscopy (2024) without evidence of bleeding.  Capsule endoscopy was recommended; however he has not had any recent follow-up with GI.     He was treated with Venofer 300 mg weekly x 3 from 2024 through 2024.     He had repeat lab work on 2024.  WBC 8.54, hemoglobin 13.5, hematocrit 43.3, MCV 86, platelets 269, ferritin 64, iron saturation 12%, vitamin B12 733.  GFR is 37.  Patient follows with nephrology for management of CKD..    In patients with iron deficiency along with chronic kidney disease we typically replete iron to keep ferritin greater than 100 and iron saturation greater than 20%.    Hemoglobin has improved significantly from prior.  Patient feels much better after receiving IV iron.     He is currently taking oral iron 1 tablet every other day which he prefers to continue with right now.  Can consider IV iron in the future if he does not respond to oral iron or is unable to tolerate.    Patient will return to clinic with repeat lab work in 4 months time.    Orders:  •  CBC and differential; Future    Acute deep vein thrombosis (DVT) of left peroneal vein (HCC)  Patient with recent finding of acute nonocclusive DVT in the gastrocnemius and peroneal veins in the left lower extremity. ?Provoked by hospital stay from  through 2024 for SOB.  He also has distant history of DVT.  Details are unclear.     He was placed on Coumadin on 2024.  This was being managed by his PCP. This was discontinued by his PCP in early  September 2024.    He is following with vascular.  He had repeat venous duplex on 12/13/2024.  This showed no evidence of acute or chronic DVT in either lower extremity.       Vitamin B12 deficiency  Vitamin B12 level on 12/19/2024 was 733 which is good.  Patient is not on supplementation.  Orders:  •  Vitamin B12; Future    Folate deficiency  Folate level on 12/19/2024 was 18.4 which is good.  Patient is not on supplementation.  Orders:  •  Folate; Future    Iron deficiency anemia due to chronic blood loss  Continue oral iron 1 tablet every other day as above.  Orders:  •  Iron Panel (Includes Ferritin, Iron Sat%, Iron, and TIBC); Future  •  Comprehensive metabolic panel; Future      History of Present Illness {?Quick Links Encounters * My Last Note * Last Note in Specialty * Snapshot * Since Last Visit * History :98940}  Chief Complaint   Patient presents with   • Follow-up   Thomas Horne is a 79 y.o. male. Patient was evaluated in follow-up of anemia.     This was felt to be related to iron deficiency along with chronic kidney disease.     He was treated with Venofer 300 mg weekly x 3 from 6/11/2024 through 6/25/2024.     He says he noticed improvement in fatigue after receiving the IV iron.     More recently he has been dealing with wounds in the right lower extremity.  He is following with vascular.     He denies any obvious bleeding.  No evidence of GI bleeding.  He has not recently followed up with GI.    He recently had pacemaker placed.  He says that he feels better after pacemaker placement.  He has more energy.  He denies shortness of breath.  He has good appetite.  No nausea, vomiting, change in bowel habits.     Review of Systems   Constitutional:  Positive for fatigue. Negative for activity change, appetite change and fever.   HENT:  Negative for nosebleeds.    Respiratory:  Negative for cough, choking and shortness of breath.    Cardiovascular:  Negative for chest pain, palpitations and leg swelling.  "  Gastrointestinal:  Negative for abdominal distention, abdominal pain, anal bleeding, blood in stool, constipation, diarrhea, nausea and vomiting.   Endocrine: Negative for cold intolerance.   Genitourinary:  Negative for hematuria.   Musculoskeletal:  Positive for arthralgias. Negative for myalgias.   Skin:  Negative for color change, pallor and rash.   Allergic/Immunologic: Negative for immunocompromised state.   Neurological:  Negative for headaches.   Hematological:  Negative for adenopathy. Does not bruise/bleed easily.   All other systems reviewed and are negative.        Objective {?Quick Links Trend Vitals * Enter New Vitals * Results Review * Timeline (Adult) * Labs * Imaging * Cardiology * Procedures * Lung Cancer Screening * Surgical eConsent :74217}  /72 (BP Location: Left arm, Patient Position: Sitting, Cuff Size: Adult)   Pulse 58   Temp 97.6 °F (36.4 °C) (Temporal)   Resp 17   Ht 6' 2\" (1.88 m)   Wt 104 kg (229 lb)   SpO2 100%   BMI 29.40 kg/m²     Physical Exam  Constitutional:       General: He is not in acute distress.     Appearance: Normal appearance. He is well-developed.   HENT:      Head: Normocephalic and atraumatic.      Right Ear: External ear normal.      Left Ear: External ear normal.      Nose: Nose normal.   Eyes:      General: No scleral icterus.     Conjunctiva/sclera: Conjunctivae normal.   Cardiovascular:      Rate and Rhythm: Normal rate and regular rhythm.   Pulmonary:      Effort: Pulmonary effort is normal. No respiratory distress.      Breath sounds: Normal breath sounds.   Abdominal:      General: Abdomen is flat. There is no distension.      Palpations: Abdomen is soft.      Tenderness: There is no abdominal tenderness.   Musculoskeletal:      Right lower leg: No edema.      Left lower leg: No edema.   Skin:     Findings: No rash (on exposed skin.).   Neurological:      Mental Status: He is alert and oriented to person, place, and time.   Psychiatric:         " Mood and Affect: Mood normal.         Thought Content: Thought content normal.         Judgment: Judgment normal.     Extremities: Patient reports wounds on the right lower extremity which are healing.  Not examined.    Labs:   Latest Reference Range & Units 12/19/24 10:28   Iron 50 - 212 ug/dL 41 (L)   FERRITIN 24 - 336 ng/mL 64   Iron Saturation 15 - 50 % 12 (L)   TRANSFERRIN 203 - 362 mg/dL 247   TIBC 250 - 450 ug/dL 345.8   UIBC 155 - 355 ug/dL 305   FOLATE >5.9 ng/mL 18.4   Vitamin B-12 180 - 914 pg/mL 733   WBC 4.31 - 10.16 Thousand/uL 8.54   RBC 3.88 - 5.62 Million/uL 5.03   Hemoglobin 12.0 - 17.0 g/dL 13.5   Hematocrit 36.5 - 49.3 % 43.3   MCV 82 - 98 fL 86   MCH 26.8 - 34.3 pg 26.8   MCHC 31.4 - 37.4 g/dL 31.2 (L)   RDW 11.6 - 15.1 % 14.0   Platelet Count 149 - 390 Thousands/uL 269   MPV 8.9 - 12.7 fL 12.0   nRBC /100 WBCs 0   Segmented % 43 - 75 % 73   Lymphocytes % 14 - 44 % 16   Monocytes % 4 - 12 % 7   Eosinophils % 0 - 6 % 2   Basophils % 0 - 1 % 1   Immature Grans % 0 - 2 % 1   Absolute Immature Grans 0.00 - 0.20 Thousand/uL 0.04   Absolute Neutrophils 1.85 - 7.62 Thousands/µL 6.34   Absolute Lymphocytes 0.60 - 4.47 Thousands/µL 1.32   Absolute Monocytes 0.17 - 1.22 Thousand/µL 0.62   Absolute Eosinophils 0.00 - 0.61 Thousand/µL 0.18   Absolute Basophils 0.00 - 0.10 Thousands/µL 0.04   (L): Data is abnormally low

## 2024-12-23 NOTE — ASSESSMENT & PLAN NOTE
Patient was evaluated in follow-up of anemia, likely secondary to chronic blood loss along with Stage III CKD.     Patient with iron deficiency anemia with history of GI bleeding. He had recent EGD (4/10/2024) and colonoscopy (2/14/2024) without evidence of bleeding.  Capsule endoscopy was recommended; however he has not had any recent follow-up with GI.     He was treated with Venofer 300 mg weekly x 3 from 6/11/2024 through 6/25/2024.     He had repeat lab work on 12/19/2024.  WBC 8.54, hemoglobin 13.5, hematocrit 43.3, MCV 86, platelets 269, ferritin 64, iron saturation 12%, vitamin B12 733.  GFR is 37.  Patient follows with nephrology for management of CKD..    In patients with iron deficiency along with chronic kidney disease we typically replete iron to keep ferritin greater than 100 and iron saturation greater than 20%.    Hemoglobin has improved significantly from prior.  Patient feels much better after receiving IV iron.     He is currently taking oral iron 1 tablet every other day which he prefers to continue with right now.  Can consider IV iron in the future if he does not respond to oral iron or is unable to tolerate.    Patient will return to clinic with repeat lab work in 4 months time.    Orders:  •  CBC and differential; Future

## 2024-12-24 ENCOUNTER — OFFICE VISIT (OUTPATIENT)
Dept: WOUND CARE | Facility: HOSPITAL | Age: 79
End: 2024-12-24
Payer: COMMERCIAL

## 2024-12-24 DIAGNOSIS — L97.525 DIABETIC ULCER OF LEFT FOOT ASSOCIATED WITH TYPE 2 DIABETES MELLITUS, WITH MUSCLE INVOLVEMENT WITHOUT EVIDENCE OF NECROSIS, UNSPECIFIED PART OF FOOT (HCC): Primary | ICD-10-CM

## 2024-12-24 DIAGNOSIS — E11.621 DIABETIC ULCER OF TOE OF RIGHT FOOT ASSOCIATED WITH TYPE 2 DIABETES MELLITUS, WITH FAT LAYER EXPOSED (HCC): ICD-10-CM

## 2024-12-24 DIAGNOSIS — E11.621 DIABETIC ULCER OF LEFT FOOT ASSOCIATED WITH TYPE 2 DIABETES MELLITUS, WITH MUSCLE INVOLVEMENT WITHOUT EVIDENCE OF NECROSIS, UNSPECIFIED PART OF FOOT (HCC): Primary | ICD-10-CM

## 2024-12-24 DIAGNOSIS — I73.9 PERIPHERAL ARTERY DISEASE (HCC): ICD-10-CM

## 2024-12-24 DIAGNOSIS — L97.512 DIABETIC ULCER OF TOE OF RIGHT FOOT ASSOCIATED WITH TYPE 2 DIABETES MELLITUS, WITH FAT LAYER EXPOSED (HCC): ICD-10-CM

## 2024-12-24 PROCEDURE — 11042 DBRDMT SUBQ TIS 1ST 20SQCM/<: CPT | Performed by: FAMILY MEDICINE

## 2024-12-24 PROCEDURE — 97597 DBRDMT OPN WND 1ST 20 CM/<: CPT | Performed by: FAMILY MEDICINE

## 2024-12-24 PROCEDURE — 99213 OFFICE O/P EST LOW 20 MIN: CPT | Performed by: FAMILY MEDICINE

## 2024-12-24 RX ORDER — FENOFIBRATE 160 MG/1
160 TABLET ORAL DAILY
Qty: 90 TABLET | Refills: 1 | Status: SHIPPED | OUTPATIENT
Start: 2024-12-24

## 2024-12-24 NOTE — PROGRESS NOTES
Wound Procedure Treatment Diabetic Ulcer Left Heel    Performed by: Graciela Philip RN  Authorized by: Torey Fernandez MD    Associated wounds:   Wound 08/15/24 Diabetic Ulcer Heel Left  Wound cleansed with:  NSS  Applied to periwound:  Moisture lotion  Applied primary dressing:  Calcium alginate and Silver  Applied secondary dressing:  Gauze  Dressing secured with:  Ender and Tape  Wound Procedure Treatment Diabetic Ulcer Right;Posterior Toe D1, great    Performed by: Graciela Philip RN  Authorized by: Torey Fernandez MD    Associated wounds:   Wound 11/08/24 Diabetic Ulcer Toe D1, great Right;Posterior  Wound cleansed with:  NSS  Applied to periwound:  Moisture lotion  Applied primary dressing:  Dermagran  Applied secondary dressing:  Gauze  Dressing secured with:  Ender and Tape

## 2024-12-24 NOTE — PROGRESS NOTES
Patient ID: Thomas Horne is a 79 y.o. male Date of Birth 1945       No chief complaint on file.      Allergies:  Lisinopril    Diagnosis:      Diagnosis ICD-10-CM Associated Orders   1. Diabetic ulcer of left foot associated with type 2 diabetes mellitus, with muscle involvement without evidence of necrosis, unspecified part of foot ([facility])  E11.621 Wound cleansing and dressings    L97.525 Wound Procedure Treatment Diabetic Ulcer Left Heel     Wound Procedure Treatment Diabetic Ulcer Right;Posterior Toe D1, great      2. Diabetic ulcer of toe of right foot associated with type 2 diabetes mellitus, with fat layer exposed ([facility])  E11.621 Wound cleansing and dressings    L97.512 Wound Procedure Treatment Diabetic Ulcer Left Heel     Wound Procedure Treatment Diabetic Ulcer Right;Posterior Toe D1, great      3. Peripheral artery disease ([facility])  I73.9 Wound cleansing and dressings     Wound Procedure Treatment Diabetic Ulcer Left Heel     Wound Procedure Treatment Diabetic Ulcer Right;Posterior Toe D1, great              Assessment & Plan:  Status post recent hospitalization for bradycardia now with pacemaker insertion.  Feeling much better.  DFU 3 of the left heel.  Appears almost closed after debridement today.  1 small area remaining.  Silver alginate.  3 times a week.  DFU 2 of the right hallux tip.  Surgical debridement.  Dermagran change 3 times a week.   A1C results reviewed with the patient today.           Subjective:   12/24/2024: Patient returns to the wound center after hospitalization for bradycardia and having underwent pacemaker implantation.  He is feeling much better.  According to hospital notes, he did have CHF treated with diuretics.  Patient has significant PAD status post bypass surgery of both lower extremities, the left being the most recent.  He was being treated for DFU 3 of the left heel with hyperbarics.  He was doing well.  In addition, he also had an ulcer at the tip of the right  hallux.        The following portions of the patient's history were reviewed and updated as appropriate:   Patient Active Problem List   Diagnosis    Mixed hyperlipidemia    Primary hypertension    Coronary artery disease involving native coronary artery of native heart without angina pectoris    S/P angioplasty with stent    Hx of CABG    S/P AAA repair    S/P prostatectomy    H/O prostate cancer    Type 2 diabetes mellitus with diabetic polyneuropathy, with long-term current use of insulin (HCC)    Varicose veins of left lower extremity    History of endovascular stent graft for abdominal aortic aneurysm (AAA)    Bruit (arterial)    Peripheral artery disease (HCC)    Type 2 diabetes mellitus with diabetic peripheral angiopathy without gangrene, with long-term current use of insulin (HCC)    Actinic keratoses    Acute kidney injury superimposed on chronic kidney disease  (HCC)    Platelets decreased (HCC)    Gross hematuria    Acute on chronic HFpEF/Moderate pHTN (HFpEF) (HCC)    Nonrheumatic aortic valve stenosis    Chronic kidney disease-mineral and bone disorder    Stable angina pectoris (HCC)    Hypertensive urgency    Elevated troponin level not due myocardial infarction    Diabetic ulcer of right midfoot associated with diabetes mellitus due to underlying condition, limited to breakdown of skin (HCC)    Acute on chronic diastolic HF (heart failure) (HCC)    Stage 3b chronic kidney disease (HCC)    Frequent PVCs    Melena    Symptomatic anemia    Multiple gastric ulcers    Iron deficiency anemia due to chronic blood loss    Duodenal ulcer    History of colon polyps    Shortness of breath    Iron deficiency anemia    Acute deep vein thrombosis (DVT) of left peroneal vein (HCC)    Plantar fasciitis, right    Diabetic ulcer of left foot associated with type 2 diabetes mellitus, with muscle involvement without evidence of necrosis (HCC)    History of DVT (deep vein thrombosis)    SOB (shortness of breath)     Nausea    Elevated troponin    Foot ulcer (HCC)    Bradycardia    Multiple open wounds of lower extremity    S/P placement of cardiac pacemaker     Past Medical History:   Diagnosis Date    Anemia     CAD (coronary artery disease)     Callus     Cancer (HCC)     prostate    CHF (congestive heart failure) (HCC)     Chronic kidney disease     Clotting disorder (HCC)     Coronary artery disease 1988    Deep vein thrombosis (HCC)     Diabetes mellitus (HCC)     Difficulty walking     Duodenal ulcer     Ear problems     Heart disease 1988    HL (hearing loss)     Hyperlipidemia     Hypertension     Myocardial infarction (HCC)     Neuropathy     Bilateral feet    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)     Plantar fasciitis     Sleep apnea     Could not tolerate CPAP     Past Surgical History:   Procedure Laterality Date    ABDOMINAL AORTIC ANEURYSM REPAIR      Stented    ADENOIDECTOMY      BACK SURGERY  1985    CARDIAC CATHETERIZATION Left 10/19/2022    Procedure: Cardiac Left Heart Cath;  Surgeon: Danielle Pereira MD;  Location: [facility];  Service: Cardiology    CARDIAC ELECTROPHYSIOLOGY PROCEDURE Left 12/16/2024    Procedure: Cardiac pacer implant;  Surgeon: Danielle Pereira MD;  Location: [facility];  Service: Cardiology    CARDIAC SURGERY  2002    3 cardiac bypass then angioplasty 7/2020    CHOLECYSTECTOMY      COLONOSCOPY  02/14/2024    CORONARY ARTERY BYPASS GRAFT      IR LOWER EXTREMITY ANGIOGRAM  11/01/2023    IR LOWER EXTREMITY ANGIOGRAM  08/07/2024    LAMINECTOMY  1990    IA BYPASS W/VEIN FEMORAL-POPLITEAL Right 11/16/2023    Procedure: BYPASS FEMORAL-POPLITEAL WITH CRYO VEIN, RIGHT FEMORAL ENDARTERECTOMY;  Surgeon: Vasquez Clark MD;  Location: [facility];  Service: Vascular    IA BYPASS W/VEIN FEMORAL-POPLITEAL Left 8/30/2024    Procedure: left lower extremity above knee popliteal to below knee popliteal artery bypass with PTFE graft;  Surgeon: Vasquez Clark MD;  Location:  [facility];  Service: Vascular    TN SLCTV CATHJ 3RD+ ORD SLCTV ABDL PEL/LXTR BRNCH Right 2023    Procedure: ARTERIOGRAM Right lower extremity arteriogram with CO2 via right groin access;  Surgeon: Vasquez Clark MD;  Location: [facility];  Service: Vascular    TN SLCTV CATHJ 3RD+ ORD SLCTV ABDL PEL/LXTR BRNCH Left 2024    Procedure: diagnostic LLE Arteriogram;  Surgeon: Vasquez Clark MD;  Location: [facility];  Service: Vascular    PROSTATE SURGERY      TONSILLECTOMY      URINARY SPHINCTER IMPLANT       Family History   Problem Relation Age of Onset    Diabetes Mother     Alcohol abuse Father     Heart disease Brother     Cancer Sister         Thyroid    Cancer Sister         Colon    Cancer Brother         Throat    Thyroid disease Brother     Cancer Brother     Diabetes Sister     Thyroid disease Sister     Mental illness Neg Hx       Social History     Socioeconomic History    Marital status:      Spouse name: Not on file    Number of children: 2    Years of education: Not on file    Highest education level: Some college, no degree   Occupational History    Not on file   Tobacco Use    Smoking status: Former     Current packs/day: 0.00     Average packs/day: 1.5 packs/day for 35.0 years (52.5 ttl pk-yrs)     Types: Cigarettes     Start date: 1956     Quit date:      Years since quittin.0     Passive exposure: Past    Smokeless tobacco: Never   Vaping Use    Vaping status: Never Used   Substance and Sexual Activity    Alcohol use: Yes     Alcohol/week: 14.0 standard drinks of alcohol     Types: 14 Cans of beer per week     Comment: stopped last few months    Drug use: Never    Sexual activity: Not Currently     Partners: Female     Birth control/protection: Male Sterilization   Other Topics Concern    Not on file   Social History Narrative    Not on file     Social Drivers of Health     Financial Resource Strain: Low Risk  (10/31/2023)    Overall  Financial Resource Strain (CARDIA)     Difficulty of Paying Living Expenses: Not hard at all   Food Insecurity: No Food Insecurity (2024)    Nursing - Inadequate Food Risk Classification     Worried About Running Out of Food in the Last Year: Never true     Ran Out of Food in the Last Year: Never true     Ran Out of Food in the Last Year: 1   Transportation Needs: No Transportation Needs (2024)    Nursing - Transportation Risk Classification     Lack of Transportation: Not on file     Lack of Transportation: 2   Physical Activity: Not on file   Stress: Not on file   Social Connections: Not on file   Intimate Partner Violence: Unknown (2024)    Nursing IPS     Feels Physically and Emotionally Safe: Not on file     Physically Hurt by Someone: Not on file     Humiliated or Emotionally Abused by Someone: Not on file     Physically Hurt by Someone: 2     Hurt or Threatened by Someone: 2   Housing Stability: Unknown (2024)    Nursing: Inadequate Housing Risk Classification     Has Housing: Not on file     Worried About Losing Housing: Not on file     Unable to Get Utilities: Not on file     Unable to Pay for Housing in the Last Year: 2     Has Housin        Current Outpatient Medications:     acetaminophen (TYLENOL) 325 mg tablet, Take 2 tablets (650 mg total) by mouth every 6 (six) hours as needed for mild pain, Disp: , Rfl:     amLODIPine (NORVASC) 2.5 mg tablet, Take 1 tablet (2.5 mg total) by mouth daily, Disp: 90 tablet, Rfl: 1    aspirin 81 mg chewable tablet, Chew 81 mg daily, Disp: , Rfl:     atorvastatin (LIPITOR) 40 mg tablet, TAKE 1 TABLET BY MOUTH DAILY, Disp: 90 tablet, Rfl: 1    Blood Glucose Monitoring Suppl (OneTouch Verio Reflect) w/Device KIT, Check blood sugars twice daily. Please substitute with appropriate alternative as covered by patient's insurance. Dx: E11.65, Disp: 1 kit, Rfl: 0    clopidogrel (PLAVIX) 75 mg tablet, Take 1 tablet (75 mg total) by mouth daily, Disp: 30  tablet, Rfl: 3    Droplet Pen Needles 32G X 4 MM MISC, USE EVERY EVENING, Disp: 100 each, Rfl: 5    DULoxetine (CYMBALTA) 30 mg delayed release capsule, Take 1 capsule (30 mg total) by mouth daily, Disp: 90 capsule, Rfl: 0    Empagliflozin (Jardiance) 25 MG TABS, TAKE 1 TABLET BY MOUTH DAILY, Disp: 90 tablet, Rfl: 1    fenofibrate 160 MG tablet, TAKE 1 TABLET BY MOUTH DAILY, Disp: 90 tablet, Rfl: 1    insulin glargine (LANTUS) 100 units/mL subcutaneous injection, Inject 15 Units under the skin daily at bedtime, Disp: 10 mL, Rfl: 0    isosorbide mononitrate (IMDUR) 30 mg 24 hr tablet, TAKE 3 TABLETS BY MOUTH DAILY, Disp: 270 tablet, Rfl: 1    losartan (COZAAR) 25 mg tablet, Take 1 tablet (25 mg total) by mouth daily, Disp: 90 tablet, Rfl: 1    metFORMIN (GLUCOPHAGE) 500 mg tablet, TAKE 1 TABLET BY MOUTH TWICE  DAILY WITH MEALS, Disp: 180 tablet, Rfl: 1    metoprolol succinate (TOPROL-XL) 100 mg 24 hr tablet, Take 1 tablet (100 mg total) by mouth daily, Disp: 90 tablet, Rfl: 1    Multiple Vitamins-Minerals (MULTIVITAMIN MEN 50+ PO), Take by mouth daily, Disp: , Rfl:     nitroglycerin (NITROSTAT) 0.4 mg SL tablet, Place 1 tablet (0.4 mg total) under the tongue every 5 (five) minutes as needed for chest pain, Disp: 30 tablet, Rfl: 0    Omega-3 Fatty Acids (fish oil) 1,000 mg, Take 4,000 mg by mouth 2 (two) times a day, Disp: , Rfl:     OneTouch Delica Lancets 33G MISC, Check blood sugars twice daily. Please substitute with appropriate alternative as covered by patient's insurance. Dx: E11.65, Disp: 200 each, Rfl: 3    pantoprazole (PROTONIX) 40 mg tablet, TAKE 1 TABLET BY MOUTH DAILY, Disp: 90 tablet, Rfl: 1    pentoxifylline (TRENtal) 400 mg ER tablet, TAKE 1 TABLET BY MOUTH 3 TIMES  DAILY WITH MEALS, Disp: 270 tablet, Rfl: 3    pregabalin (LYRICA) 150 mg capsule, Take 1 capsule (150 mg total) by mouth 3 (three) times a day, Disp: 270 capsule, Rfl: 0    ranolazine (RANEXA) 1000 MG SR tablet, Take 1 tablet (1,000 mg  total) by mouth 2 (two) times a day, Disp: 180 tablet, Rfl: 3    sitaGLIPtin (JANUVIA) 25 mg tablet, Take 1 tablet (25 mg total) by mouth daily, Disp: 30 tablet, Rfl: 0    sodium hypochlorite (DAKIN'S HALF-STRENGTH) external solution, Apply 1 Application topically every other day At each dressing change (Patient not taking: Reported on 12/20/2024), Disp: 473 mL, Rfl: 2    torsemide (DEMADEX) 20 mg tablet, TAKE 1 TABLET BY MOUTH DAILY, Disp: 90 tablet, Rfl: 1    Review of Systems   Constitutional:  Negative for chills and fever.   HENT:  Negative for congestion and postnasal drip.    Respiratory:  Negative for cough.    Cardiovascular:  Positive for leg swelling.   Musculoskeletal:  Positive for gait problem.   Skin:  Positive for wound (Left heel right hallux).   Psychiatric/Behavioral:  Positive for dysphoric mood. The patient is not nervous/anxious.        Objective:  There were no vitals taken for this visit.        Physical Exam  Vitals and nursing note reviewed.   Constitutional:       Appearance: Normal appearance. He is well-developed and normal weight.   HENT:      Head: Normocephalic and atraumatic.   Cardiovascular:      Rate and Rhythm: Normal rate.      Pulses:           Dorsalis pedis pulses are 0 on the right side and 1+ on the left side.   Pulmonary:      Effort: Pulmonary effort is normal.   Musculoskeletal:        Feet:    Feet:      Comments: Necrotic tip of the right hallux.  Moist.  No malodor and no surrounding erythema.  Thick, dry eschar of the left heel.  No drainage.  No surrounding erythema.  Skin:     General: Skin is warm and dry.      Findings: Wound present.   Neurological:      Mental Status: He is alert and oriented to person, place, and time.   Psychiatric:         Attention and Perception: Attention normal.         Mood and Affect: Mood and affect normal.         Behavior: Behavior is cooperative.         Cognition and Memory: Cognition normal.                   Wound 08/15/24  Diabetic Ulcer Heel Left (Active)   Enter Sumner score: Sumner Grade 3: Deep abscess, OM, or joint sepsis 12/24/24 1123   Wound Image Images linked 12/24/24 1151   Wound Description Dry;Brown;Other (Comment) (scab) 12/24/24 1123   Clara-wound Assessment Callus 12/24/24 1123   Wound Length (cm) 0.5 cm 12/24/24 1123   Wound Width (cm) 2 cm 12/24/24 1123   Wound Depth (cm) 0 cm 12/24/24 1123   Wound Surface Area (cm^2) 1 cm^2 12/24/24 1123   Wound Volume (cm^3) 0 cm^3 12/24/24 1123   Calculated Wound Volume (cm^3) 0 cm^3 12/24/24 1123   Change in Wound Size % 100 12/24/24 1123   Drainage Amount None 12/24/24 1123   Non-staged Wound Description Full thickness 12/24/24 1123   Dressing Status Intact (upon arrival) 12/24/24 1123       Wound 11/08/24 Diabetic Ulcer Toe D1, great Right;Posterior (Active)   Wound Image Images linked 12/24/24 1156   Wound Description Brown;Aguiar;Pink;White 12/24/24 1120   Clara-wound Assessment Maceration;White 12/24/24 1120   Wound Length (cm) 1.6 cm 12/24/24 1120   Wound Width (cm) 2 cm 12/24/24 1120   Wound Depth (cm) 0.1 cm 12/24/24 1120   Wound Surface Area (cm^2) 3.2 cm^2 12/24/24 1120   Wound Volume (cm^3) 0.32 cm^3 12/24/24 1120   Calculated Wound Volume (cm^3) 0.32 cm^3 12/24/24 1120   Change in Wound Size % -433.33 12/24/24 1120   Drainage Amount Small 12/24/24 1120   Drainage Description Serosanguineous;Yellow;Foul smelling 12/24/24 1120   Dressing Status Intact (upon arrival) 12/24/24 1120       [REMOVED] Wound 12/16/24 Traumatic Leg Right;Anterior (Removed)   Wound Image Images linked 12/24/24 1119   Wound Description Brown;Other (Comment) (scab) 12/24/24 1119   Clara-wound Assessment Dry 12/24/24 1119   Wound Length (cm) 0 cm 12/24/24 1119   Wound Width (cm) 0 cm 12/24/24 1119   Wound Depth (cm) 0 cm 12/24/24 1119   Wound Surface Area (cm^2) 0 cm^2 12/24/24 1119   Wound Volume (cm^3) 0 cm^3 12/24/24 1119   Calculated Wound Volume (cm^3) 0 cm^3 12/24/24 1119   Change in Wound Size %  "100 12/24/24 1119   Drainage Amount None 12/24/24 1119   Non-staged Wound Description Not applicable 12/24/24 1119   Dressing Status Other (Comment) (no dressing upon arrival) 12/24/24 1119        Debridement   Wound 11/08/24 Diabetic Ulcer Toe D1, great Right;Posterior    Universal Protocol:  procedure performed by consultantConsent: Verbal consent obtained. Written consent obtained.  Consent given by: patient  Time out: Immediately prior to procedure a \"time out\" was called to verify the correct patient, procedure, equipment, support staff and site/side marked as required.  Patient understanding: patient states understanding of the procedure being performed  Patient identity confirmed: verbally with patient    Debridement Details  Performed by: physician  Debridement type: surgical  Level of debridement: subcutaneous tissue  Pain control: lidocaine 4%      Post-debridement measurements  Length (cm): 1.6  Width (cm): 2  Depth (cm): 0.2  Percent debrided: 100%  Surface Area (cm^2): 3.2  Area Debrided (cm^2): 3.2  Volume (cm^3): 0.64    Tissue and other material debrided: dermis, epidermis and subcutaneous tissue  Devitalized tissue debrided: necrotic debris and eschar  Instrument(s) utilized: blade and forceps  Bleeding: small  Hemostasis obtained with: not applicable  Procedural pain (0-10): 0  Post-procedural pain: 0   Response to treatment: procedure was tolerated well         Results from last 6 Months   Lab Units 08/08/24  1344   WOUND CULTURE  4+ Growth of Klebsiella pneumoniae*  3+ Growth of Citrobacter koseri*  2+ Growth of Pseudomonas aeruginosa*  2+ Growth of       Lab Results   Component Value Date    HGBA1C 6.2 (H) 10/14/2024       Wound Instructions:  Orders Placed This Encounter   Procedures    Wound cleansing and dressings     LEFT HEEL WOUND :  Wash your hands with soap and water.  Remove old dressing, discard into plastic bag and place in trash.  Cleanse the wound with soap and water prior to " applying a clean dressing. Do not use tissue or cotton balls. Do not scrub the wound. Pat dry using gauze.  Shower: NO  Apply moisturizer to skin surrounding wound  Apply silver alginate to the heel wound.  Cover with gauze.  Secure with rolled gauze and tape.  Change dressing three times per week.     RIGHT TOE WOUND:  Wash your hands with soap and water.  Remove old dressing, discard into plastic bag and place in trash.  Cleanse the wound with soap and water prior to applying a clean dressing. Do not use tissue or cotton balls. Do not scrub the wound. Pat dry using gauze.  Shower: NO  Apply Dermagran to toe wound.   Cover with gauze  Secure with rolled gauze and tape.  Change dressing three times per week.        Off-loading Instructions:     Keep weight and pressure off wound at all times. and Wear off-loading OOFOS slides are ok as directed by your physician. Put on immediately when rising in the morning and remove when going to bed.      Wear off-loading device as directed by your physician (Globo Ped). Put on immediately when rising in the morning and remove when going to bed and Turn every 2 hours. Avoid position directing pressure to Wound site. Limit side lying to 30 degree tilt. Limit the head of bed elevation to 30 degrees. Limit walking and standing to only necessity for activities of daily living.      Wound infection: If you have signs of infection please call the wound center. If the wound center is closedplease go to the Emergency department. Some signs of infection: fever, chills, increased redness, red streaks, increase in pain, increased drainage. Drainage with an odor, Change in drainage color: white/milky/green/tan/yellow, an increase in swelling, chest pain and/or shortness of breath.     Protein: Eat protein with each meal to promote healing. Examples of protein are fish, meat, chicken, nuts, peanut butter, eggs, lentils, edamame or a protein shake.     Standing Status:   Future     Expiration  "Date:   12/31/2024    Wound Procedure Treatment Diabetic Ulcer Left Heel     This order was created via procedure documentation    Wound Procedure Treatment Diabetic Ulcer Right;Posterior Toe D1, great     This order was created via procedure documentation             Torey Fernandez MD, CHT, CWS    Portions of the record may have been created with voice recognition software. Occasional wrong word or \"sound alike\" substitutions may have occurred due to the inherent limitations of voice recognition software. Read the chart carefully and recognize, using context, where substitutions have occurred.      "

## 2024-12-24 NOTE — PATIENT INSTRUCTIONS
Orders Placed This Encounter   Procedures    Wound cleansing and dressings     LEFT HEEL WOUND :  Wash your hands with soap and water.  Remove old dressing, discard into plastic bag and place in trash.  Cleanse the wound with soap and water prior to applying a clean dressing. Do not use tissue or cotton balls. Do not scrub the wound. Pat dry using gauze.  Shower: NO  Apply moisturizer to skin surrounding wound  Apply silver alginate to the heel wound.  Cover with gauze.  Secure with rolled gauze and tape.  Change dressing three times per week.     RIGHT TOE WOUND:  Wash your hands with soap and water.  Remove old dressing, discard into plastic bag and place in trash.  Cleanse the wound with soap and water prior to applying a clean dressing. Do not use tissue or cotton balls. Do not scrub the wound. Pat dry using gauze.  Shower: NO  Apply Dermagran to toe wound.   Cover with gauze  Secure with rolled gauze and tape.  Change dressing three times per week.        Off-loading Instructions:     Keep weight and pressure off wound at all times. and Wear off-loading OOFOS slides are ok as directed by your physician. Put on immediately when rising in the morning and remove when going to bed.      Wear off-loading device as directed by your physician (Globo Ped). Put on immediately when rising in the morning and remove when going to bed and Turn every 2 hours. Avoid position directing pressure to Wound site. Limit side lying to 30 degree tilt. Limit the head of bed elevation to 30 degrees. Limit walking and standing to only necessity for activities of daily living.      Wound infection: If you have signs of infection please call the wound center. If the wound center is closedplease go to the Emergency department. Some signs of infection: fever, chills, increased redness, red streaks, increase in pain, increased drainage. Drainage with an odor, Change in drainage color: white/milky/green/tan/yellow, an increase in swelling,  chest pain and/or shortness of breath.     Protein: Eat protein with each meal to promote healing. Examples of protein are fish, meat, chicken, nuts, peanut butter, eggs, lentils, edamame or a protein shake.     Standing Status:   Future     Expiration Date:   12/31/2024

## 2024-12-24 NOTE — PROGRESS NOTES
"Debridement   Wound 08/15/24 Diabetic Ulcer Heel Left    Universal Protocol:  procedure performed by consultantConsent: Verbal consent obtained. Written consent obtained.  Consent given by: patient  Time out: Immediately prior to procedure a \"time out\" was called to verify the correct patient, procedure, equipment, support staff and site/side marked as required.  Patient understanding: patient states understanding of the procedure being performed  Patient identity confirmed: verbally with patient    Debridement Details  Performed by: physician  Debridement type: selective  Pain control: lidocaine 4%      Post-debridement measurements  Length (cm): 0.5  Width (cm): 0.3  Depth (cm): 0.1  Percent debrided: 100%  Surface Area (cm^2): 0.15  Area Debrided (cm^2): 0.15  Volume (cm^3): 0.02    Devitalized tissue debrided: eschar  Instrument(s) utilized: curette  Bleeding: small  Hemostasis obtained with: pressure  Procedural pain (0-10): 0  Post-procedural pain: 0   Response to treatment: procedure was tolerated well  Debridement Comments: After debridement of most of the eschar, there was 1 small open area that was bleeding and eventually controlled by pressure.      "

## 2025-01-02 ENCOUNTER — DOCUMENTATION (OUTPATIENT)
Dept: NEPHROLOGY | Facility: CLINIC | Age: 80
End: 2025-01-02

## 2025-01-03 ENCOUNTER — OFFICE VISIT (OUTPATIENT)
Dept: WOUND CARE | Facility: HOSPITAL | Age: 80
DRG: 638 | End: 2025-01-03
Payer: COMMERCIAL

## 2025-01-03 ENCOUNTER — HOSPITAL ENCOUNTER (INPATIENT)
Facility: HOSPITAL | Age: 80
LOS: 1 days | DRG: 638 | End: 2025-01-04
Payer: COMMERCIAL

## 2025-01-03 ENCOUNTER — APPOINTMENT (EMERGENCY)
Dept: RADIOLOGY | Facility: HOSPITAL | Age: 80
DRG: 638 | End: 2025-01-03
Payer: COMMERCIAL

## 2025-01-03 VITALS
DIASTOLIC BLOOD PRESSURE: 73 MMHG | RESPIRATION RATE: 18 BRPM | HEART RATE: 70 BPM | TEMPERATURE: 96.5 F | SYSTOLIC BLOOD PRESSURE: 124 MMHG

## 2025-01-03 DIAGNOSIS — E11.621 DIABETIC ULCER OF TOE OF RIGHT FOOT ASSOCIATED WITH TYPE 2 DIABETES MELLITUS, WITH NECROSIS OF BONE (HCC): Primary | ICD-10-CM

## 2025-01-03 DIAGNOSIS — L97.525 DIABETIC ULCER OF LEFT FOOT ASSOCIATED WITH TYPE 2 DIABETES MELLITUS, WITH MUSCLE INVOLVEMENT WITHOUT EVIDENCE OF NECROSIS, UNSPECIFIED PART OF FOOT (HCC): ICD-10-CM

## 2025-01-03 DIAGNOSIS — E11.621 DIABETIC ULCER OF LEFT FOOT ASSOCIATED WITH TYPE 2 DIABETES MELLITUS, WITH MUSCLE INVOLVEMENT WITHOUT EVIDENCE OF NECROSIS, UNSPECIFIED PART OF FOOT (HCC): ICD-10-CM

## 2025-01-03 DIAGNOSIS — L97.514 DIABETIC ULCER OF TOE OF RIGHT FOOT ASSOCIATED WITH TYPE 2 DIABETES MELLITUS, WITH NECROSIS OF BONE (HCC): Primary | ICD-10-CM

## 2025-01-03 DIAGNOSIS — E08.621 DIABETIC ULCER OF RIGHT MIDFOOT ASSOCIATED WITH DIABETES MELLITUS DUE TO UNDERLYING CONDITION, LIMITED TO BREAKDOWN OF SKIN (HCC): ICD-10-CM

## 2025-01-03 DIAGNOSIS — M86.9 OSTEOMYELITIS (HCC): Primary | ICD-10-CM

## 2025-01-03 DIAGNOSIS — L97.411 DIABETIC ULCER OF RIGHT MIDFOOT ASSOCIATED WITH DIABETES MELLITUS DUE TO UNDERLYING CONDITION, LIMITED TO BREAKDOWN OF SKIN (HCC): ICD-10-CM

## 2025-01-03 PROBLEM — L97.512 DIABETIC ULCER OF TOE OF RIGHT FOOT ASSOCIATED WITH TYPE 2 DIABETES MELLITUS, WITH FAT LAYER EXPOSED (HCC): Status: ACTIVE | Noted: 2024-08-08

## 2025-01-03 PROBLEM — L97.519 ULCER OF RIGHT FOOT (HCC): Status: ACTIVE | Noted: 2024-09-07

## 2025-01-03 LAB
ANION GAP SERPL CALCULATED.3IONS-SCNC: 5 MMOL/L (ref 4–13)
BASOPHILS # BLD AUTO: 0.03 THOUSANDS/ΜL (ref 0–0.1)
BASOPHILS NFR BLD AUTO: 0 % (ref 0–1)
BUN SERPL-MCNC: 34 MG/DL (ref 5–25)
CALCIUM SERPL-MCNC: 9.9 MG/DL (ref 8.4–10.2)
CHLORIDE SERPL-SCNC: 100 MMOL/L (ref 96–108)
CO2 SERPL-SCNC: 30 MMOL/L (ref 21–32)
CREAT SERPL-MCNC: 1.73 MG/DL (ref 0.6–1.3)
CRP SERPL QL: 10.7 MG/L
EOSINOPHIL # BLD AUTO: 0.12 THOUSAND/ΜL (ref 0–0.61)
EOSINOPHIL NFR BLD AUTO: 1 % (ref 0–6)
ERYTHROCYTE [DISTWIDTH] IN BLOOD BY AUTOMATED COUNT: 14.4 % (ref 11.6–15.1)
ERYTHROCYTE [SEDIMENTATION RATE] IN BLOOD: 52 MM/HOUR (ref 0–19)
GFR SERPL CREATININE-BSD FRML MDRD: 36 ML/MIN/1.73SQ M
GLUCOSE SERPL-MCNC: 138 MG/DL (ref 65–140)
GLUCOSE SERPL-MCNC: 152 MG/DL (ref 65–140)
HCT VFR BLD AUTO: 45.3 % (ref 36.5–49.3)
HGB BLD-MCNC: 14.3 G/DL (ref 12–17)
IMM GRANULOCYTES # BLD AUTO: 0.05 THOUSAND/UL (ref 0–0.2)
IMM GRANULOCYTES NFR BLD AUTO: 1 % (ref 0–2)
LYMPHOCYTES # BLD AUTO: 1.37 THOUSANDS/ΜL (ref 0.6–4.47)
LYMPHOCYTES NFR BLD AUTO: 14 % (ref 14–44)
MCH RBC QN AUTO: 27.2 PG (ref 26.8–34.3)
MCHC RBC AUTO-ENTMCNC: 31.6 G/DL (ref 31.4–37.4)
MCV RBC AUTO: 86 FL (ref 82–98)
MONOCYTES # BLD AUTO: 0.58 THOUSAND/ΜL (ref 0.17–1.22)
MONOCYTES NFR BLD AUTO: 6 % (ref 4–12)
NEUTROPHILS # BLD AUTO: 7.91 THOUSANDS/ΜL (ref 1.85–7.62)
NEUTS SEG NFR BLD AUTO: 78 % (ref 43–75)
NRBC BLD AUTO-RTO: 0 /100 WBCS
PLATELET # BLD AUTO: 307 THOUSANDS/UL (ref 149–390)
PMV BLD AUTO: 11.2 FL (ref 8.9–12.7)
POTASSIUM SERPL-SCNC: 4.5 MMOL/L (ref 3.5–5.3)
RBC # BLD AUTO: 5.26 MILLION/UL (ref 3.88–5.62)
SODIUM SERPL-SCNC: 135 MMOL/L (ref 135–147)
WBC # BLD AUTO: 10.06 THOUSAND/UL (ref 4.31–10.16)

## 2025-01-03 PROCEDURE — 87040 BLOOD CULTURE FOR BACTERIA: CPT | Performed by: NURSE PRACTITIONER

## 2025-01-03 PROCEDURE — 87070 CULTURE OTHR SPECIMN AEROBIC: CPT

## 2025-01-03 PROCEDURE — 99285 EMERGENCY DEPT VISIT HI MDM: CPT

## 2025-01-03 PROCEDURE — 87147 CULTURE TYPE IMMUNOLOGIC: CPT

## 2025-01-03 PROCEDURE — 82948 REAGENT STRIP/BLOOD GLUCOSE: CPT

## 2025-01-03 PROCEDURE — 99284 EMERGENCY DEPT VISIT MOD MDM: CPT

## 2025-01-03 PROCEDURE — 85025 COMPLETE CBC W/AUTO DIFF WBC: CPT

## 2025-01-03 PROCEDURE — 85652 RBC SED RATE AUTOMATED: CPT

## 2025-01-03 PROCEDURE — 99223 1ST HOSP IP/OBS HIGH 75: CPT | Performed by: SURGERY

## 2025-01-03 PROCEDURE — 11044 DBRDMT BONE 1ST 20 SQ CM/<: CPT | Performed by: FAMILY MEDICINE

## 2025-01-03 PROCEDURE — 87181 SC STD AGAR DILUTION PER AGT: CPT | Performed by: NURSE PRACTITIONER

## 2025-01-03 PROCEDURE — 96365 THER/PROPH/DIAG IV INF INIT: CPT

## 2025-01-03 PROCEDURE — 99214 OFFICE O/P EST MOD 30 MIN: CPT | Performed by: FAMILY MEDICINE

## 2025-01-03 PROCEDURE — 96366 THER/PROPH/DIAG IV INF ADDON: CPT

## 2025-01-03 PROCEDURE — 87154 CUL TYP ID BLD PTHGN 6+ TRGT: CPT | Performed by: NURSE PRACTITIONER

## 2025-01-03 PROCEDURE — 99222 1ST HOSP IP/OBS MODERATE 55: CPT | Performed by: NURSE PRACTITIONER

## 2025-01-03 PROCEDURE — 73630 X-RAY EXAM OF FOOT: CPT

## 2025-01-03 PROCEDURE — 87077 CULTURE AEROBIC IDENTIFY: CPT | Performed by: NURSE PRACTITIONER

## 2025-01-03 PROCEDURE — 80048 BASIC METABOLIC PNL TOTAL CA: CPT

## 2025-01-03 PROCEDURE — 36415 COLL VENOUS BLD VENIPUNCTURE: CPT

## 2025-01-03 PROCEDURE — 87186 SC STD MICRODIL/AGAR DIL: CPT

## 2025-01-03 PROCEDURE — 87205 SMEAR GRAM STAIN: CPT

## 2025-01-03 PROCEDURE — 86140 C-REACTIVE PROTEIN: CPT

## 2025-01-03 RX ORDER — PENTOXIFYLLINE 400 MG/1
400 TABLET, EXTENDED RELEASE ORAL
Status: DISCONTINUED | OUTPATIENT
Start: 2025-01-03 | End: 2025-01-04 | Stop reason: HOSPADM

## 2025-01-03 RX ORDER — CLOPIDOGREL BISULFATE 75 MG/1
75 TABLET ORAL DAILY
Status: DISCONTINUED | OUTPATIENT
Start: 2025-01-04 | End: 2025-01-04 | Stop reason: HOSPADM

## 2025-01-03 RX ORDER — CHLORAL HYDRATE 500 MG
1000 CAPSULE ORAL 2 TIMES DAILY
Status: DISCONTINUED | OUTPATIENT
Start: 2025-01-04 | End: 2025-01-04 | Stop reason: HOSPADM

## 2025-01-03 RX ORDER — ATORVASTATIN CALCIUM 40 MG/1
40 TABLET, FILM COATED ORAL
Status: DISCONTINUED | OUTPATIENT
Start: 2025-01-03 | End: 2025-01-04 | Stop reason: HOSPADM

## 2025-01-03 RX ORDER — AMLODIPINE BESYLATE 2.5 MG/1
2.5 TABLET ORAL DAILY
Status: DISCONTINUED | OUTPATIENT
Start: 2025-01-04 | End: 2025-01-04 | Stop reason: HOSPADM

## 2025-01-03 RX ORDER — FENOFIBRATE 145 MG/1
145 TABLET, COATED ORAL DAILY
Status: DISCONTINUED | OUTPATIENT
Start: 2025-01-04 | End: 2025-01-04 | Stop reason: HOSPADM

## 2025-01-03 RX ORDER — INSULIN LISPRO 100 [IU]/ML
1-5 INJECTION, SOLUTION INTRAVENOUS; SUBCUTANEOUS
Status: DISCONTINUED | OUTPATIENT
Start: 2025-01-04 | End: 2025-01-04 | Stop reason: HOSPADM

## 2025-01-03 RX ORDER — TORSEMIDE 20 MG/1
20 TABLET ORAL DAILY
Status: DISCONTINUED | OUTPATIENT
Start: 2025-01-04 | End: 2025-01-04 | Stop reason: HOSPADM

## 2025-01-03 RX ORDER — PANTOPRAZOLE SODIUM 40 MG/1
40 TABLET, DELAYED RELEASE ORAL
Status: DISCONTINUED | OUTPATIENT
Start: 2025-01-04 | End: 2025-01-04 | Stop reason: HOSPADM

## 2025-01-03 RX ORDER — NITROGLYCERIN 0.4 MG/1
0.4 TABLET SUBLINGUAL
Status: DISCONTINUED | OUTPATIENT
Start: 2025-01-03 | End: 2025-01-04 | Stop reason: HOSPADM

## 2025-01-03 RX ORDER — DULOXETIN HYDROCHLORIDE 30 MG/1
30 CAPSULE, DELAYED RELEASE ORAL DAILY
Status: DISCONTINUED | OUTPATIENT
Start: 2025-01-04 | End: 2025-01-04 | Stop reason: HOSPADM

## 2025-01-03 RX ORDER — RANOLAZINE 500 MG/1
1000 TABLET, EXTENDED RELEASE ORAL 2 TIMES DAILY
Status: DISCONTINUED | OUTPATIENT
Start: 2025-01-03 | End: 2025-01-04 | Stop reason: HOSPADM

## 2025-01-03 RX ORDER — ENOXAPARIN SODIUM 100 MG/ML
30 INJECTION SUBCUTANEOUS
Status: DISCONTINUED | OUTPATIENT
Start: 2025-01-04 | End: 2025-01-04

## 2025-01-03 RX ORDER — INSULIN GLARGINE 100 [IU]/ML
15 INJECTION, SOLUTION SUBCUTANEOUS
Status: DISCONTINUED | OUTPATIENT
Start: 2025-01-03 | End: 2025-01-04 | Stop reason: HOSPADM

## 2025-01-03 RX ORDER — INSULIN LISPRO 100 [IU]/ML
1-5 INJECTION, SOLUTION INTRAVENOUS; SUBCUTANEOUS
Status: DISCONTINUED | OUTPATIENT
Start: 2025-01-03 | End: 2025-01-04 | Stop reason: HOSPADM

## 2025-01-03 RX ORDER — LIDOCAINE 40 MG/G
CREAM TOPICAL ONCE
Status: COMPLETED | OUTPATIENT
Start: 2025-01-03 | End: 2025-01-03

## 2025-01-03 RX ORDER — RANOLAZINE 500 MG/1
1000 TABLET, EXTENDED RELEASE ORAL 2 TIMES DAILY
Status: DISCONTINUED | OUTPATIENT
Start: 2025-01-04 | End: 2025-01-03

## 2025-01-03 RX ORDER — CEFAZOLIN SODIUM 2 G/50ML
2000 SOLUTION INTRAVENOUS EVERY 8 HOURS
Status: DISCONTINUED | OUTPATIENT
Start: 2025-01-03 | End: 2025-01-04 | Stop reason: HOSPADM

## 2025-01-03 RX ORDER — METOPROLOL SUCCINATE 100 MG/1
100 TABLET, EXTENDED RELEASE ORAL DAILY
Status: DISCONTINUED | OUTPATIENT
Start: 2025-01-04 | End: 2025-01-04 | Stop reason: HOSPADM

## 2025-01-03 RX ORDER — SODIUM CHLORIDE 9 MG/ML
50 INJECTION, SOLUTION INTRAVENOUS CONTINUOUS
Status: DISPENSED | OUTPATIENT
Start: 2025-01-03 | End: 2025-01-04

## 2025-01-03 RX ORDER — TORSEMIDE 20 MG/1
20 TABLET ORAL DAILY
Status: DISCONTINUED | OUTPATIENT
Start: 2025-01-05 | End: 2025-01-03

## 2025-01-03 RX ORDER — PREGABALIN 75 MG/1
150 CAPSULE ORAL 3 TIMES DAILY
Status: DISCONTINUED | OUTPATIENT
Start: 2025-01-03 | End: 2025-01-04 | Stop reason: HOSPADM

## 2025-01-03 RX ADMIN — VANCOMYCIN HYDROCHLORIDE 2000 MG: 10 INJECTION, POWDER, LYOPHILIZED, FOR SOLUTION INTRAVENOUS at 15:14

## 2025-01-03 RX ADMIN — PREGABALIN 150 MG: 75 CAPSULE ORAL at 23:14

## 2025-01-03 RX ADMIN — RANOLAZINE 1000 MG: 500 TABLET, FILM COATED, EXTENDED RELEASE ORAL at 23:14

## 2025-01-03 RX ADMIN — PENTOXIFYLLINE 400 MG: 400 TABLET, EXTENDED RELEASE ORAL at 23:15

## 2025-01-03 RX ADMIN — CEFAZOLIN SODIUM 2000 MG: 2 SOLUTION INTRAVENOUS at 23:14

## 2025-01-03 RX ADMIN — SODIUM CHLORIDE 50 ML/HR: 0.9 INJECTION, SOLUTION INTRAVENOUS at 22:50

## 2025-01-03 RX ADMIN — INSULIN GLARGINE 15 UNITS: 100 INJECTION, SOLUTION SUBCUTANEOUS at 23:13

## 2025-01-03 RX ADMIN — INSULIN LISPRO 1 UNITS: 100 INJECTION, SOLUTION INTRAVENOUS; SUBCUTANEOUS at 23:15

## 2025-01-03 RX ADMIN — LIDOCAINE 1 APPLICATION: 40 CREAM TOPICAL at 11:02

## 2025-01-03 RX ADMIN — ATORVASTATIN CALCIUM 40 MG: 40 TABLET, FILM COATED ORAL at 23:14

## 2025-01-03 NOTE — PROGRESS NOTES
Wound Procedure Treatment Diabetic Ulcer Right;Posterior Toe D1, great    Performed by: Evelin Ruiz RN  Authorized by: Nitza Sotelo DO    Associated wounds:   Wound 11/08/24 Diabetic Ulcer Toe D1, great Right;Posterior  Wound cleansed with:  NSS  Applied secondary dressing:  Gauze  Dressing secured with:  Tape

## 2025-01-03 NOTE — ASSESSMENT & PLAN NOTE
Stable, no acute exacerbation of renal function, labs listed as below    Lab Results   Component Value Date    EGFR 36 01/03/2025    EGFR 37 12/19/2024    EGFR 39 12/17/2024    CREATININE 1.73 (H) 01/03/2025    CREATININE 1.69 (H) 12/19/2024    CREATININE 1.61 (H) 12/17/2024     -IV fluid hydration  -Avoid nephrotoxic agents  -Medicine to manage

## 2025-01-03 NOTE — ED PROVIDER NOTES
Time reflects when diagnosis was documented in both MDM as applicable and the Disposition within this note       Time User Action Codes Description Comment    1/3/2025  5:32 PM Jose Manuel Umanzor Add [M86.9] Osteomyelitis (HCC)     1/3/2025  8:16 PM Andie Marcelinacharlahillary Add [E08.621,  L97.411] Diabetic ulcer of right midfoot associated with diabetes mellitus due to underlying condition, limited to breakdown of skin (HCC)           ED Disposition       ED Disposition   Transfer to Another Facility-In Network    Condition   --    Date/Time   Fri Tal 3, 2025  5:32 PM    Comment   Thomas Horne should be transferred out to [facility].               Assessment & Plan       Medical Decision Making  Amount and/or Complexity of Data Reviewed  Labs: ordered. Decision-making details documented in ED Course.  Radiology: ordered.        80 y/o M with R great toe chronic wound, worsening, underlying toe osteomyelitis, sent in from wound care. Poor arterial flow to foot. Long discussion with podiatry, vascular regarding appropriate management. Pt not interested in amputations, wants to maximize medical management. After discussions with pt and vascular, ultimately pts dispo to be transferred to [place] so pt can be angiogram and additional management that can't be performed at [place]. Pt signed out to [facility] provider pending transfer.     ED Course as of 01/04/25 1056   Fri Jan 03, 2025   1551 Creatinine(!): 1.73  baseline   1633 Vascular to evaluate pt       Medications   insulin lispro (HumALOG/ADMELOG) 100 units/mL subcutaneous injection 1-5 Units (has no administration in time range)   insulin lispro (HumALOG/ADMELOG) 100 units/mL subcutaneous injection 1-5 Units (has no administration in time range)   vancomycin (VANCOCIN) 2,000 mg in sodium chloride 0.9 % 500 mL IVPB (0 mg Intravenous Stopped 1/3/25 1726)       ED Risk Strat Scores                          SBIRT 22yo+      Flowsheet Row Most Recent Value   Initial Alcohol Screen: US  "AUDIT-C     1. How often do you have a drink containing alcohol? 1 Filed at: 01/03/2025 1244   2. How many drinks containing alcohol do you have on a typical day you are drinking?  0 Filed at: 01/03/2025 1244   3b. FEMALE Any Age, or MALE 65+: How often do you have 4 or more drinks on one occassion? 0 Filed at: 01/03/2025 1244   Audit-C Score 1 Filed at: 01/03/2025 1249   EUGENE: How many times in the past year have you...    Used an illegal drug or used a prescription medication for non-medical reasons? Never Filed at: 01/03/2025 1246                            History of Present Illness       Chief Complaint   Patient presents with    Wound Infection - Complicated     States sent from Wound Care \" because I have a bone infection in my right great toe and the doctor said she would call \". Patient then states the doctor could not feel much of a pulse and there is not much blood flow \".        Past Medical History:   Diagnosis Date    Anemia     CAD (coronary artery disease)     Callus     Cancer (HCC)     prostate    CHF (congestive heart failure) (HCC)     Chronic kidney disease     Clotting disorder (HCC)     Coronary artery disease 1988    Deep vein thrombosis (HCC)     Diabetes mellitus (HCC)     Difficulty walking     Duodenal ulcer     Ear problems     Heart disease 1988    HL (hearing loss)     Hyperlipidemia     Hypertension     Myocardial infarction (HCC)     Neuropathy     Bilateral feet    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)     Plantar fasciitis     Sleep apnea     Could not tolerate CPAP      Past Surgical History:   Procedure Laterality Date    ABDOMINAL AORTIC ANEURYSM REPAIR      Stented    ADENOIDECTOMY      BACK SURGERY  1985    CARDIAC CATHETERIZATION Left 10/19/2022    Procedure: Cardiac Left Heart Cath;  Surgeon: Danielle Pereira MD;  Location: [facility];  Service: Cardiology    CARDIAC ELECTROPHYSIOLOGY PROCEDURE Left 12/16/2024    Procedure: Cardiac pacer implant;  Surgeon: Danielle Pereira, " MD;  Location: [facility];  Service: Cardiology    CARDIAC SURGERY  2002    3 cardiac bypass then angioplasty 2020    CHOLECYSTECTOMY      COLONOSCOPY  2024    CORONARY ARTERY BYPASS GRAFT      IR LOWER EXTREMITY ANGIOGRAM  2023    IR LOWER EXTREMITY ANGIOGRAM  2024    LAMINECTOMY      IN BYPASS W/VEIN FEMORAL-POPLITEAL Right 2023    Procedure: BYPASS FEMORAL-POPLITEAL WITH CRYO VEIN, RIGHT FEMORAL ENDARTERECTOMY;  Surgeon: Vasquez Clark MD;  Location: [facility];  Service: Vascular    IN BYPASS W/VEIN FEMORAL-POPLITEAL Left 2024    Procedure: left lower extremity above knee popliteal to below knee popliteal artery bypass with PTFE graft;  Surgeon: Vasquez Clark MD;  Location: [facility];  Service: Vascular    IN SLCTV CATHJ 3RD+ ORD SLCTV ABDL PEL/LXTR BRNC Right 2023    Procedure: ARTERIOGRAM Right lower extremity arteriogram with CO2 via right groin access;  Surgeon: Vasquez Clark MD;  Location: [facility];  Service: Vascular    IN SLCTV CATHJ 3RD+ ORD SLCTV ABDL PEL/LXTR BRNCH Left 2024    Procedure: diagnostic LLE Arteriogram;  Surgeon: Vasquez Clark MD;  Location: [facility];  Service: Vascular    PROSTATE SURGERY      TONSILLECTOMY      URINARY SPHINCTER IMPLANT        Family History   Problem Relation Age of Onset    Diabetes Mother     Alcohol abuse Father     Heart disease Brother     Cancer Sister         Thyroid    Cancer Sister         Colon    Cancer Brother         Throat    Thyroid disease Brother     Cancer Brother     Diabetes Sister     Thyroid disease Sister     Mental illness Neg Hx       Social History     Tobacco Use    Smoking status: Former     Current packs/day: 0.00     Average packs/day: 1.5 packs/day for 35.0 years (52.5 ttl pk-yrs)     Types: Cigarettes     Start date: 1956     Quit date:      Years since quittin.0     Passive exposure: Past    Smokeless tobacco:  Never   Vaping Use    Vaping status: Never Used   Substance Use Topics    Alcohol use: Yes     Alcohol/week: 14.0 standard drinks of alcohol     Types: 14 Cans of beer per week     Comment: stopped last few months    Drug use: Never      E-Cigarette/Vaping    E-Cigarette Use Never User       E-Cigarette/Vaping Substances    Nicotine No     THC No     CBD No     Flavoring No     Other No     Unknown No       I have reviewed and agree with the history as documented.     HPI    Patient is an 78 y/o M sent in from wound care with concern of osteomyelitis. Wound care note reviewed. Pt has no c/o pain, fever, chills, nausea, vomiting. Pt wants to pursue vascular procedure to help improve chances of healing.     Review of Systems   Constitutional:  Negative for chills and fever.   HENT:  Negative for ear pain and sore throat.    Eyes:  Negative for pain and visual disturbance.   Respiratory:  Negative for cough and shortness of breath.    Cardiovascular:  Negative for chest pain and palpitations.   Gastrointestinal:  Negative for abdominal pain and vomiting.   Genitourinary:  Negative for dysuria and hematuria.   Musculoskeletal:  Negative for arthralgias and back pain.   Skin:  Positive for wound. Negative for color change and rash.   Neurological:  Negative for seizures and syncope.   All other systems reviewed and are negative.          Objective       ED Triage Vitals   Temperature Pulse Blood Pressure Respirations SpO2 Patient Position - Orthostatic VS   01/03/25 1241 01/03/25 1241 01/03/25 1242 01/03/25 1241 01/03/25 1242 01/03/25 1242   (!) 96.8 °F (36 °C) 73 111/62 20 98 % Sitting      Temp Source Heart Rate Source BP Location FiO2 (%) Pain Score    01/03/25 1241 01/03/25 1241 01/03/25 1242 -- 01/03/25 1242    Tympanic Monitor Left arm  No Pain      Vitals      Date and Time Temp Pulse SpO2 Resp BP Pain Score FACES Pain Rating User   01/04/25 0700 97.3 °F (36.3 °C) 79 96 % 20 97/56 10 - Worst Possible Pain -- AC    01/04/25 0252 98.2 °F (36.8 °C) 73 96 % 20 98/50 -- -- BD   01/04/25 0150 -- -- -- -- -- 10 - Worst Possible Pain -- [facility] [place] [facility]   01/03/25 2334 97.9 °F (36.6 °C) 68 96 % 18 102/53 -- -- [facility] [place] [facility]   01/03/25 2124 97.9 °F (36.6 °C) 69 95 % 18 103/54 -- --    01/03/25 2030 -- 65 94 % 18 113/55 -- --    01/03/25 2000 -- 70 94 % 18 115/50 -- --    01/03/25 1945 -- 71 90 % 18 141/64 -- --    01/03/25 1800 -- 70 99 % 18 133/63 -- -- MILE   01/03/25 1527 -- 66 98 % 18 115/66 -- --    01/03/25 1242 -- -- 98 % -- 111/62 No Pain --    01/03/25 1241 96.8 °F (36 °C) 73 -- 20 -- -- --             Physical Exam  Vitals and nursing note reviewed.   Constitutional:       General: He is not in acute distress.     Appearance: He is well-developed. He is not toxic-appearing.   HENT:      Head: Normocephalic and atraumatic.      Right Ear: External ear normal.      Left Ear: External ear normal.      Nose: Nose normal.      Mouth/Throat:      Pharynx: Oropharynx is clear. No oropharyngeal exudate or posterior oropharyngeal erythema.   Eyes:      Extraocular Movements: Extraocular movements intact.      Conjunctiva/sclera: Conjunctivae normal.      Pupils: Pupils are equal, round, and reactive to light.   Cardiovascular:      Rate and Rhythm: Normal rate and regular rhythm.      Pulses: Normal pulses.      Heart sounds: Normal heart sounds. No murmur heard.     No friction rub. No gallop.   Pulmonary:      Effort: Pulmonary effort is normal. No respiratory distress.      Breath sounds: Normal breath sounds. No wheezing, rhonchi or rales.   Abdominal:      General: Abdomen is flat.      Palpations: Abdomen is soft.      Tenderness: There is no abdominal tenderness. There is no guarding or rebound.   Musculoskeletal:         General: Normal range of motion.      Cervical back: Normal range of motion. No rigidity.      Right lower leg: No edema.      Left lower leg: No edema.   Skin:     General: Skin is warm and dry.      Capillary  Refill: Capillary refill takes less than 2 seconds.      Comments: R great toe wound, able to probe to bone. No significant surrounding erythema or edema. Unable to palpate pulses in the foot however foot is warm and perfused.    Neurological:      General: No focal deficit present.      Mental Status: He is alert.   Psychiatric:         Mood and Affect: Mood normal.         Results Reviewed       Procedure Component Value Units Date/Time    Wound culture and Gram stain [[id number]] Collected: 01/03/25 1513    Lab Status: Preliminary result Specimen: Wound from Toe, Right Updated: 01/04/25 0148     Gram Stain Result No Polys or Bacteria seen    Blood culture [[id number]] Collected: 01/03/25 2035    Lab Status: Preliminary result Specimen: Blood from Arm, Right Updated: 01/04/25 0055     Blood Culture Received in Microbiology Lab. Culture in Progress.    Blood culture [[id number]] Collected: 01/03/25 2035    Lab Status: Preliminary result Specimen: Blood from Arm, Right Updated: 01/04/25 0001     Blood Culture Received in Microbiology Lab. Culture in Progress.    Sedimentation rate, automated [[id number]]  (Abnormal) Collected: 01/03/25 1459    Lab Status: Final result Specimen: Blood from Arm, Left Updated: 01/03/25 1552     Sed Rate 52 mm/hour     Basic metabolic panel [[id number]]  (Abnormal) Collected: 01/03/25 1459    Lab Status: Final result Specimen: Blood from Arm, Left Updated: 01/03/25 1549     Sodium 135 mmol/L      Potassium 4.5 mmol/L      Chloride 100 mmol/L      CO2 30 mmol/L      ANION GAP 5 mmol/L      BUN 34 mg/dL      Creatinine 1.73 mg/dL      Glucose 138 mg/dL      Calcium 9.9 mg/dL      eGFR 36 ml/min/1.73sq m     Narrative:      National Kidney Disease Foundation guidelines for Chronic Kidney Disease (CKD):     Stage 1 with normal or high GFR (GFR > 90 mL/min/1.73 square meters)    Stage 2 Mild CKD (GFR = 60-89 mL/min/1.73 square meters)    Stage 3A Moderate CKD (GFR = 45-59 mL/min/1.73 square  meters)    Stage 3B Moderate CKD (GFR = 30-44 mL/min/1.73 square meters)    Stage 4 Severe CKD (GFR = 15-29 mL/min/1.73 square meters)    Stage 5 End Stage CKD (GFR <15 mL/min/1.73 square meters)  Note: GFR calculation is accurate only with a steady state creatinine    C-reactive protein [[id number]]  (Abnormal) Collected: 01/03/25 1459    Lab Status: Final result Specimen: Blood from Arm, Left Updated: 01/03/25 1549     CRP 10.7 mg/L     CBC and differential [[id number]]  (Abnormal) Collected: 01/03/25 1459    Lab Status: Final result Specimen: Blood from Arm, Left Updated: 01/03/25 1506     WBC 10.06 Thousand/uL      RBC 5.26 Million/uL      Hemoglobin 14.3 g/dL      Hematocrit 45.3 %      MCV 86 fL      MCH 27.2 pg      MCHC 31.6 g/dL      RDW 14.4 %      MPV 11.2 fL      Platelets 307 Thousands/uL      nRBC 0 /100 WBCs      Segmented % 78 %      Immature Grans % 1 %      Lymphocytes % 14 %      Monocytes % 6 %      Eosinophils Relative 1 %      Basophils Relative 0 %      Absolute Neutrophils 7.91 Thousands/µL      Absolute Immature Grans 0.05 Thousand/uL      Absolute Lymphocytes 1.37 Thousands/µL      Absolute Monocytes 0.58 Thousand/µL      Eosinophils Absolute 0.12 Thousand/µL      Basophils Absolute 0.03 Thousands/µL             XR foot 3+ views RIGHT   Final Interpretation by Jose Menendez MD (01/03 1544)      No acute osseous abnormality.   Ulcer distal great toe without underlying osseous changes to suggest osteomyelitis. Consider MRI for further evaluation as clinically dictated.         Computerized Assisted Algorithm (CAA) may have been used to analyze all applicable images.         Workstation performed: [id number]             Procedures    ED Medication and Procedure Management   Prior to Admission Medications   Prescriptions Last Dose Informant Patient Reported? Taking?   Blood Glucose Monitoring Suppl (OneTouch Verio Reflect) w/Device KIT  Self No No   Sig: Check blood sugars twice daily.  Please substitute with appropriate alternative as covered by patient's insurance. Dx: E11.65   DULoxetine (CYMBALTA) 30 mg delayed release capsule 1/3/2025 at 10:00 AM Self No Yes   Sig: Take 1 capsule (30 mg total) by mouth daily   Droplet Pen Needles 32G X 4 MM MISC  Self No No   Sig: USE EVERY EVENING   Empagliflozin (Jardiance) 25 MG TABS 1/3/2025 at 10:00 AM  No Yes   Sig: TAKE 1 TABLET BY MOUTH DAILY   Multiple Vitamins-Minerals (MULTIVITAMIN MEN 50+ PO) 1/3/2025 at 10:00 AM Self Yes Yes   Sig: Take by mouth daily   Omega-3 Fatty Acids (fish oil) 1,000 mg 1/3/2025 at 10:00 AM Self Yes Yes   Sig: Take 4,000 mg by mouth 2 (two) times a day   OneTouch Delica Lancets 33G MISC  Self No No   Sig: Check blood sugars twice daily. Please substitute with appropriate alternative as covered by patient's insurance. Dx: E11.65   acetaminophen (TYLENOL) 325 mg tablet  Self No No   Sig: Take 2 tablets (650 mg total) by mouth every 6 (six) hours as needed for mild pain   amLODIPine (NORVASC) 2.5 mg tablet 1/3/2025 at 10:00 AM  No Yes   Sig: Take 1 tablet (2.5 mg total) by mouth daily   aspirin 81 mg chewable tablet Not Taking Self Yes No   Sig: Chew 81 mg daily   Patient not taking: Reported on 1/3/2025   atorvastatin (LIPITOR) 40 mg tablet 1/2/2025 Bedtime Self No Yes   Sig: TAKE 1 TABLET BY MOUTH DAILY   clopidogrel (PLAVIX) 75 mg tablet 1/3/2025 at 10:00 AM Self No Yes   Sig: Take 1 tablet (75 mg total) by mouth daily   fenofibrate 160 MG tablet 1/3/2025 at 10:00 AM  No Yes   Sig: TAKE 1 TABLET BY MOUTH DAILY   insulin glargine (LANTUS) 100 units/mL subcutaneous injection 1/2/2025 Bedtime Self No Yes   Sig: Inject 15 Units under the skin daily at bedtime   isosorbide mononitrate (IMDUR) 30 mg 24 hr tablet 1/3/2025 at 10:00 AM Self No Yes   Sig: TAKE 3 TABLETS BY MOUTH DAILY   losartan (COZAAR) 25 mg tablet 1/3/2025 at 10:00 AM  No Yes   Sig: Take 1 tablet (25 mg total) by mouth daily   metFORMIN (GLUCOPHAGE) 500 mg tablet  1/3/2025 at 10:00 AM Self No Yes   Sig: TAKE 1 TABLET BY MOUTH TWICE  DAILY WITH MEALS   metoprolol succinate (TOPROL-XL) 100 mg 24 hr tablet 1/3/2025 at 10:00 AM  No Yes   Sig: Take 1 tablet (100 mg total) by mouth daily   nitroglycerin (NITROSTAT) 0.4 mg SL tablet  Self No No   Sig: Place 1 tablet (0.4 mg total) under the tongue every 5 (five) minutes as needed for chest pain   oxyCODONE-acetaminophen (PERCOCET) 5-325 mg per tablet   Yes Yes   Sig: Take 1 tablet by mouth 2 (two) times a day as needed for moderate pain or severe pain   pantoprazole (PROTONIX) 40 mg tablet 1/3/2025 Morning Self No Yes   Sig: TAKE 1 TABLET BY MOUTH DAILY   pentoxifylline (TRENtal) 400 mg ER tablet 1/3/2025 at 10:00 AM Self No Yes   Sig: TAKE 1 TABLET BY MOUTH 3 TIMES  DAILY WITH MEALS   pregabalin (LYRICA) 150 mg capsule 1/3/2025 at 10:00 AM Self No Yes   Sig: Take 1 capsule (150 mg total) by mouth 3 (three) times a day   ranolazine (RANEXA) 1000 MG SR tablet 1/3/2025 at 10:00 AM Self No Yes   Sig: Take 1 tablet (1,000 mg total) by mouth 2 (two) times a day   sitaGLIPtin (JANUVIA) 25 mg tablet 1/3/2025 at 10:00 AM  No Yes   Sig: Take 1 tablet (25 mg total) by mouth daily   sodium hypochlorite (DAKIN'S HALF-STRENGTH) external solution  Self No No   Sig: Apply 1 Application topically every other day At each dressing change   Patient not taking: Reported on 12/20/2024   torsemide (DEMADEX) 20 mg tablet 1/3/2025 at 10:00 AM Self No Yes   Sig: TAKE 1 TABLET BY MOUTH DAILY      Facility-Administered Medications Last Administration Doses Remaining   lidocaine (LMX) 4 % cream 1/3/2025 11:02 AM 0        Current Discharge Medication List        CONTINUE these medications which have NOT CHANGED    Details   amLODIPine (NORVASC) 2.5 mg tablet Take 1 tablet (2.5 mg total) by mouth daily  Qty: 90 tablet, Refills: 1    Associated Diagnoses: Frequent PVCs; Acute on chronic diastolic HF (heart failure) (HCC); Chronic kidney disease-mineral and bone  disorder; Stable angina pectoris (HCC); Hypertensive urgency      atorvastatin (LIPITOR) 40 mg tablet TAKE 1 TABLET BY MOUTH DAILY  Qty: 90 tablet, Refills: 1    Comments: Please send a replace/new response with 90-Day Supply if appropriate to maximize member benefit. Requesting 1 year supply.  Associated Diagnoses: Coronary artery disease involving native coronary artery of native heart without angina pectoris; Mixed hyperlipidemia      clopidogrel (PLAVIX) 75 mg tablet Take 1 tablet (75 mg total) by mouth daily  Qty: 30 tablet, Refills: 3    Associated Diagnoses: Diabetic ulcer of toe of left foot associated with type 2 diabetes mellitus, limited to breakdown of skin (HCC); Peripheral artery disease (HCC)      DULoxetine (CYMBALTA) 30 mg delayed release capsule Take 1 capsule (30 mg total) by mouth daily  Qty: 90 capsule, Refills: 0    Associated Diagnoses: Metatarsalgia of both feet; Radiculopathy of lumbar region; Diabetic polyneuropathy associated with type 2 diabetes mellitus (HCC); Peripheral arteriosclerosis (HCC); Diabetic ulcer of left heel associated with type 2 diabetes mellitus, limited to breakdown of skin (HCC); Diabetic ulcer of toe of left foot associated with type 2 diabetes mellitus, limited to breakdown of skin (HCC)      Empagliflozin (Jardiance) 25 MG TABS TAKE 1 TABLET BY MOUTH DAILY  Qty: 90 tablet, Refills: 1    Comments: Please send a replace/new response with 90-Day Supply if appropriate to maximize member benefit. Requesting 1 year supply.  Associated Diagnoses: Type 2 diabetes mellitus with diabetic peripheral angiopathy without gangrene, with long-term current use of insulin (HCC)      fenofibrate 160 MG tablet TAKE 1 TABLET BY MOUTH DAILY  Qty: 90 tablet, Refills: 1    Comments: Please send a replace/new response with 90-Day Supply if appropriate to maximize member benefit. Requesting 1 year supply.  Associated Diagnoses: Mixed hyperlipidemia      insulin glargine (LANTUS) 100 units/mL  subcutaneous injection Inject 15 Units under the skin daily at bedtime  Qty: 10 mL, Refills: 0    Associated Diagnoses: Type 2 diabetes mellitus with diabetic peripheral angiopathy without gangrene, with long-term current use of insulin ([facility])      isosorbide mononitrate (IMDUR) 30 mg 24 hr tablet TAKE 3 TABLETS BY MOUTH DAILY  Qty: 270 tablet, Refills: 1    Comments: Please send a replace/new response with 90-Day Supply if appropriate to maximize member benefit. Requesting 1 year supply.  Associated Diagnoses: Essential hypertension      losartan (COZAAR) 25 mg tablet Take 1 tablet (25 mg total) by mouth daily  Qty: 90 tablet, Refills: 1    Associated Diagnoses: Chronic heart failure with preserved ejection fraction (HFpEF) ([facility]); Acute kidney injury superimposed on chronic kidney disease  ([facility]); Duodenal ulcer      metFORMIN (GLUCOPHAGE) 500 mg tablet TAKE 1 TABLET BY MOUTH TWICE  DAILY WITH MEALS  Qty: 180 tablet, Refills: 1    Comments: Please send a replace/new response with 90-Day Supply if appropriate to maximize member benefit. Requesting 1 year supply.  Associated Diagnoses: Type 2 diabetes mellitus with diabetic peripheral angiopathy without gangrene, with long-term current use of insulin ([facility])      metoprolol succinate (TOPROL-XL) 100 mg 24 hr tablet Take 1 tablet (100 mg total) by mouth daily  Qty: 90 tablet, Refills: 1    Associated Diagnoses: Chronic heart failure with preserved ejection fraction (HFpEF) ([facility]); Acute kidney injury superimposed on chronic kidney disease  ([facility]); Duodenal ulcer      Multiple Vitamins-Minerals (MULTIVITAMIN MEN 50+ PO) Take by mouth daily      Omega-3 Fatty Acids (fish oil) 1,000 mg Take 4,000 mg by mouth 2 (two) times a day      oxyCODONE-acetaminophen (PERCOCET) 5-325 mg per tablet Take 1 tablet by mouth 2 (two) times a day as needed for moderate pain or severe pain      pantoprazole (PROTONIX) 40 mg tablet TAKE 1 TABLET BY MOUTH DAILY  Qty: 90 tablet, Refills: 1     Comments: Please send a replace/new response with 90-Day Supply if appropriate to maximize member benefit. Requesting 1 year supply.  Associated Diagnoses: Duodenal ulcer      pentoxifylline (TRENtal) 400 mg ER tablet TAKE 1 TABLET BY MOUTH 3 TIMES  DAILY WITH MEALS  Qty: 270 tablet, Refills: 3    Comments: Please send a replace/new response with 90-Day Supply if appropriate to maximize member benefit. Requesting 1 year supply.  Associated Diagnoses: Peripheral arteriosclerosis (HCC)      pregabalin (LYRICA) 150 mg capsule Take 1 capsule (150 mg total) by mouth 3 (three) times a day  Qty: 270 capsule, Refills: 0    Associated Diagnoses: Diabetic ulcer of right midfoot associated with diabetes mellitus due to underlying condition, limited to breakdown of skin (HCC)      ranolazine (RANEXA) 1000 MG SR tablet Take 1 tablet (1,000 mg total) by mouth 2 (two) times a day  Qty: 180 tablet, Refills: 3    Associated Diagnoses: Chest pain, unspecified type      sitaGLIPtin (JANUVIA) 25 mg tablet Take 1 tablet (25 mg total) by mouth daily  Qty: 30 tablet, Refills: 0    Associated Diagnoses: Type 2 diabetes mellitus with diabetic peripheral angiopathy without gangrene, with long-term current use of insulin (HCC)      torsemide (DEMADEX) 20 mg tablet TAKE 1 TABLET BY MOUTH DAILY  Qty: 90 tablet, Refills: 1    Comments: Please send a replace/new response with 90-Day Supply if appropriate to maximize member benefit. Requesting 1 year supply.  Associated Diagnoses: CHF (congestive heart failure) (HCC)      acetaminophen (TYLENOL) 325 mg tablet Take 2 tablets (650 mg total) by mouth every 6 (six) hours as needed for mild pain    Associated Diagnoses: Diabetic ulcer of toe of left foot associated with type 2 diabetes mellitus, limited to breakdown of skin (HCC)      aspirin 81 mg chewable tablet Chew 81 mg daily      Blood Glucose Monitoring Suppl (OneTouch Verio Reflect) w/Device KIT Check blood sugars twice daily. Please substitute  with appropriate alternative as covered by patient's insurance. Dx: E11.65  Qty: 1 kit, Refills: 0    Associated Diagnoses: Type 2 diabetes mellitus with diabetic polyneuropathy, without long-term current use of insulin ([facility])      Droplet Pen Needles 32G X 4 MM MISC USE EVERY EVENING  Qty: 100 each, Refills: 5    Associated Diagnoses: Type 2 diabetes mellitus with diabetic polyneuropathy, without long-term current use of insulin (HCC)      nitroglycerin (NITROSTAT) 0.4 mg SL tablet Place 1 tablet (0.4 mg total) under the tongue every 5 (five) minutes as needed for chest pain  Qty: 30 tablet, Refills: 0    Associated Diagnoses: PVC (premature ventricular contraction); Hypertension, unspecified type      OneTouch Delica Lancets 33G MISC Check blood sugars twice daily. Please substitute with appropriate alternative as covered by patient's insurance. Dx: E11.65  Qty: 200 each, Refills: 3    Associated Diagnoses: Type 2 diabetes mellitus with diabetic polyneuropathy, without long-term current use of insulin ([facility])      sodium hypochlorite (DAKIN'S HALF-STRENGTH) external solution Apply 1 Application topically every other day At each dressing change  Qty: 473 mL, Refills: 2    Associated Diagnoses: Diabetic ulcer of left foot associated with type 2 diabetes mellitus, with muscle involvement without evidence of necrosis, unspecified part of foot (HCC)           No discharge procedures on file.  ED SEPSIS DOCUMENTATION   Time reflects when diagnosis was documented in both MDM as applicable and the Disposition within this note       Time User Action Codes Description Comment    1/3/2025  5:32 PM Jose Manuel Umanzor Add [M86.9] Osteomyelitis (HCC)     1/3/2025  8:16 PM Luh Chaves Add [E08.621,  L97.411] Diabetic ulcer of right midfoot associated with diabetes mellitus due to underlying condition, limited to breakdown of skin ([facility])                  Jose Manuel Umanzor MD  01/04/25 5314

## 2025-01-03 NOTE — ASSESSMENT & PLAN NOTE
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

## 2025-01-03 NOTE — ASSESSMENT & PLAN NOTE
Status post left lower and right lower extremity bypass right lower extremity bypass 11/16/2023 and left lower extremity bypass 8/30/2024, improved flow and ABIs on left lower extremity with better Doppler signals and palpable pulses, right lower extremity based on history/duration of time has reduced ABIs and reduced flow through the graft, lower likelihood of patency, this reflects an worsening wound of right lower extremity, lead performed on 12/10/2024, worsening SFA stenosis    CONCLUSION:  Impression:  RIGHT LOWER LIMB:  Evaluation shows evidence of high grade stenosis vs short segment occlusion of  the distal superficial femoral artery.  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  The femoral to popliteal artery bypass graft could not be identified.  Ankle/Brachial index: 0.47 and in the ischemic range, unable to obtain dorsalis  pedis pulse (Prior 0.48).  PVR/ PPG tracings are dampened.  Metatarsal pressure: was not obtained due to unreliable/severely dampened  waveforms.  Great toe pressure was not obtained due to unreliable/severely dampened  waveforms (Prior 20 mmHg).     LEFT LOWER LIMB:  Evidence of 50-75% stenosis noted at the tibioperoneal trunk/distal anastomosis  of the distal superficial femoral- below knee popliteal bypass graft.  Evidence of peroneal artery occlusive disease.  Ankle/Brachial index: 0.79, which is in the moderate disease category (Prior:  0.48).  PVR/ PPG tracings are dampened.  Metatarsal pressure of 105 mmHg (Prior not obtained due to absent waverforms).  Great toe pressure of 51 mmHg, which is borderline within the healing range  (Prior not obtained due to absent waveforms).     Compared to previous study on 07/29/2024, there is significant improvement in  the left FABIOLA/TBI. The right TBI is now absent with evidence of superficial  femoral artery occlusive disease.    Plan:   -Diagnostic angiogram  -Trend BMP due to CKD  -Based on patient  preference and the possibility of higher complexity open bypass considerations patient would like to go to [place] for arteriogram and consideration for bypass redo with that vascular surgery staff  -If not a candidate for bypass may consider discussion with vascular surgeon with regards to  whether or not patient is applicable for limb flow  -Continue antihyperlipidemic agent and aspirin  -Discussed with IR limitations at facility until approximately Wednesday likely for arteriogram currently at [place] and it would only be likely a diagnostic component without any ability for bypass at this campus  -Coordination with the emergency department staff and vascular surgeon  - reviewed arterial   - extensive education to the patient regarding higher level amputation versus limb conservation therapies, patient is opting currently for limb conservation

## 2025-01-03 NOTE — ASSESSMENT & PLAN NOTE
Lab Results   Component Value Date    HGBA1C 6.2 (H) 10/14/2024       Bilateral peripheral neuropathy, microangiopathic disease noted to be likely a potential source of wound development bilaterally, left lower extremity wound healed quite well    Continue tight glucose control if use of insulin-   -Routine check of hemoglobin A1c

## 2025-01-03 NOTE — PATIENT INSTRUCTIONS
Orders Placed This Encounter   Procedures    Wound cleansing and dressings Diabetic Ulcer Right;Posterior Toe D1, great     LEFT HEEL WOUND is healed today.     RIGHT TOE WOUND:    The doctor wants you to go to the emergency department to evaluate and treat right great toe wound.       Wash your hands with soap and water.  Remove old dressing, discard into plastic bag and place in trash.  Cleanse the wound with soap and water prior to applying a clean dressing. Do not use tissue or cotton balls. Do not scrub the wound. Pat dry using gauze.  Shower: NO  Apply Dermagran to toe wound.   Cover with gauze  Secure with rolled gauze and tape.  Change dressing three times per week.     Standing Status:   Future     Expiration Date:   1/10/2025

## 2025-01-03 NOTE — PROGRESS NOTES
Name: Thomas Horne      : 1945      MRN: [id number]  Encounter Provider: Nitza Sotelo DO  Encounter Date: 1/3/2025   Encounter department: [facility] WOUND CARE  :  Assessment & Plan  Diabetic ulcer of toe of right foot associated with type 2 diabetes mellitus, with necrosis of bone (HCC)  Wound is significantly worse today, now Sumner 3, with clinically evident osteomyelitis present at the base of the wound.  Small amount of bone debrided from the wound as below  Discussed with patient the severity of the bone infection, particularly in the setting of poor arterial blood flow  I recommend patient go directly to the ED for further evaluation and expedited treatment as he will likely need revascularization prior to any further surgical intervention  Patient is agreeable to the above plan  Spoke to the ED and advised consultation with vascular, podiatry, and ID    Lab Results   Component Value Date    HGBA1C 6.2 (H) 10/14/2024       Orders:  •  lidocaine (LMX) 4 % cream  •  Wound cleansing and dressings Diabetic Ulcer Right;Posterior Toe D1, great; Future  •  Wound Procedure Treatment Diabetic Ulcer Right;Posterior Toe D1, great  •  Debridement Diabetic Ulcer Right;Posterior Toe D1, great    Diabetic ulcer of left foot associated with type 2 diabetes mellitus, with muscle involvement without evidence of necrosis, unspecified part of foot (HCC)  Wound is closed  Lab Results   Component Value Date    HGBA1C 6.2 (H) 10/14/2024                History of Present Illness   Chief Complaint   Patient presents with   • Follow Up Wound Care Visit     Both feet-wound   Patient presents for follow-up of a Sumner 3 DFU of the left heel as well as a Sumner 1 DFU of the right great toe.  No significant pain or drainage of the left heel but patient reports an increase in drainage in the right great toe wound.  He has been using Dermagran on the toe wound.      Here for wound follow up.    Objective  "  /73   Pulse 70   Temp (!) 96.5 °F (35.8 °C)   Resp 18     Physical Exam  Vitals reviewed.   Constitutional:       General: He is not in acute distress.     Appearance: Normal appearance. He is obese. He is not ill-appearing, toxic-appearing or diaphoretic.   HENT:      Head: Normocephalic and atraumatic.      Right Ear: External ear normal.      Left Ear: External ear normal.   Eyes:      Conjunctiva/sclera: Conjunctivae normal.   Pulmonary:      Effort: Pulmonary effort is normal. No respiratory distress.   Musculoskeletal:      Cervical back: Neck supple.   Skin:     Comments: See wound assessment   Neurological:      Mental Status: He is alert.   Psychiatric:         Mood and Affect: Mood normal.         Behavior: Behavior normal.       Wound 11/08/24 Diabetic Ulcer Toe D1, great Right;Posterior (Active)   Enter Sumner score: Sumner Grade 1: Partial or full-thickness ulcer (superficial) 12/09/24 1114   Wound Image   01/03/25 1128   Wound Description Pink;Yellow;Brown;Probes to bone 01/03/25 1048   Clara-wound Assessment Edema;Dry 01/03/25 1048   Wound Length (cm) 2.1 cm 01/03/25 1048   Wound Width (cm) 2.8 cm 01/03/25 1048   Wound Depth (cm) 0.2 cm 01/03/25 1048   Wound Surface Area (cm^2) 5.88 cm^2 01/03/25 1048   Wound Volume (cm^3) 1.176 cm^3 01/03/25 1048   Calculated Wound Volume (cm^3) 1.18 cm^3 01/03/25 1048   Change in Wound Size % -1866.67 01/03/25 1048   Drainage Amount Moderate 01/03/25 1048   Drainage Description Serosanguineous;Yellow 01/03/25 1048   Non-staged Wound Description Full thickness 01/03/25 1048   Dressing Status Intact 01/03/25 1048       Wound 12/16/24 Incision Chest Left;Upper (Active)       Debridement   Wound 11/08/24 Diabetic Ulcer Toe D1, great Right;Posterior    Universal Protocol:  procedure performed by consultantConsent: Verbal consent obtained.  Consent given by: patient  Time out: Immediately prior to procedure a \"time out\" was called to verify the correct patient, " procedure, equipment, support staff and site/side marked as required.  Timeout called at: 1/3/2025 11:00 AM.  Patient understanding: patient states understanding of the procedure being performed  Patient identity confirmed: verbally with patient    Debridement Details  Performed by: physician  Debridement type: surgical  Level of debridement: bone  Pain control: lidocaine 4%      Post-debridement measurements  Length (cm): 2.1  Width (cm): 2.8  Depth (cm): 0.3  Percent debrided: 100%  Surface Area (cm^2): 5.88  Area Debrided (cm^2): 5.88  Volume (cm^3): 1.76    Tissue and other material debrided: bone and subcutaneous tissue  Devitalized tissue debrided: exudate, necrotic debris and slough  Instrument(s) utilized: curette  Bleeding: small  Hemostasis obtained with: pressure  Response to treatment: procedure was tolerated well       Results from last 6 Months   Lab Units 08/08/24  1344   WOUND CULTURE  4+ Growth of Klebsiella pneumoniae*  3+ Growth of Citrobacter koseri*  2+ Growth of Pseudomonas aeruginosa*  2+ Growth of         Administrative Statements   I have spent a total time of 25 minutes in caring for this patient on the day of the visit/encounter including Risks and benefits of tx options, Importance of tx compliance, Risk factor reductions, Documenting in the medical record, and Communicating with other healthcare professionals .

## 2025-01-03 NOTE — CONSULTS
Consultation - Surgery-General   Name: Thomas Horne 79 y.o. male I MRN: [id number]  Unit/Bed#: ED 15 I Date of Admission: 1/3/2025   Date of Service: 1/3/2025 I Hospital Day: 0   Consults  Physician Requesting Evaluation: Jose Manuel Umanzor MD   Reason for Evaluation / Principal Problem: Worsening RLE greater toe wound    Assessment & Plan  PAD (peripheral artery disease) ([facility])  Status post left lower and right lower extremity bypass right lower extremity bypass 11/16/2023 and left lower extremity bypass 8/30/2024, improved flow and ABIs on left lower extremity with better Doppler signals and palpable pulses, right lower extremity based on history/duration of time has reduced ABIs and reduced flow through the graft, lower likelihood of patency, this reflects an worsening wound of right lower extremity, lead performed on 12/10/2024, worsening SFA stenosis    CONCLUSION:  Impression:  RIGHT LOWER LIMB:  Evaluation shows evidence of high grade stenosis vs short segment occlusion of  the distal superficial femoral artery.  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  The femoral to popliteal artery bypass graft could not be identified.  Ankle/Brachial index: 0.47 and in the ischemic range, unable to obtain dorsalis  pedis pulse (Prior 0.48).  PVR/ PPG tracings are dampened.  Metatarsal pressure: was not obtained due to unreliable/severely dampened  waveforms.  Great toe pressure was not obtained due to unreliable/severely dampened  waveforms (Prior 20 mmHg).     LEFT LOWER LIMB:  Evidence of 50-75% stenosis noted at the tibioperoneal trunk/distal anastomosis  of the distal superficial femoral- below knee popliteal bypass graft.  Evidence of peroneal artery occlusive disease.  Ankle/Brachial index: 0.79, which is in the moderate disease category (Prior:  0.48).  PVR/ PPG tracings are dampened.  Metatarsal pressure of 105 mmHg (Prior not obtained due to absent waverforms).  Great toe pressure  of 51 mmHg, which is borderline within the healing range  (Prior not obtained due to absent waveforms).     Compared to previous study on 07/29/2024, there is significant improvement in  the left FABIOLA/TBI. The right TBI is now absent with evidence of superficial  femoral artery occlusive disease.    Plan:   -Diagnostic angiogram  -Trend BMP due to CKD  -Based on patient preference and the possibility of higher complexity open bypass considerations patient would like to go to [place] for arteriogram and consideration for bypass redo with that vascular surgery staff  -If not a candidate for bypass may consider discussion with vascular surgeon with regards to  whether or not patient is applicable for limb flow  -Continue antihyperlipidemic agent and aspirin  -Discussed with IR limitations at facility until approximately Wednesday likely for arteriogram currently at [place] and it would only be likely a diagnostic component without any ability for bypass at this campus  -Coordination with the emergency department staff and vascular surgeon  - reviewed arterial   - extensive education to the patient regarding higher level amputation versus limb conservation therapies, patient is opting currently for limb conservation  Type 2 diabetes mellitus with diabetic polyneuropathy, with long-term current use of insulin ([facility])    Lab Results   Component Value Date    HGBA1C 6.2 (H) 10/14/2024       Bilateral peripheral neuropathy, microangiopathic disease noted to be likely a potential source of wound development bilaterally, left lower extremity wound healed quite well    Continue tight glucose control if use of insulin-   -Routine check of hemoglobin A1c  Diabetic ulcer of toe of right foot associated with type 2 diabetes mellitus, with fat layer exposed ([facility])  AVSS, no leukocytosis, currently does not appear secondarily infected, preliminary x-rays do not seem to indicate highly suggestive findings of osteomyelitis although MRI  has not been ordered, degradation of wound with loss of tissue on the distal greater toe of the right lower extremity, down to fat tissue and possibly bone, due to a combination of vascular disease, diabetes, neuropathy, no pain currently with the patient presenting hemodynamically stable, likely Sumner stage III    Lab Results   Component Value Date    HGBA1C 6.2 (H) 10/14/2024     -  Debride as needed and local wound care per podiatry  -  tight glucose control  -Discussion with patient regarding limb conservation techniques  -From admission for optimization of vascular disease  -   CKD stage 3b, GFR 30-44 ml/min (HCC)  Stable, no acute exacerbation of renal function, labs listed as below    Lab Results   Component Value Date    EGFR 36 01/03/2025    EGFR 37 12/19/2024    EGFR 39 12/17/2024    CREATININE 1.73 (H) 01/03/2025    CREATININE 1.69 (H) 12/19/2024    CREATININE 1.61 (H) 12/17/2024     -IV fluid hydration  -Avoid nephrotoxic agents  -Medicine to manage      Surgery-General service will follow.    History of Present Illness   Thomas Horne is a 79 y.o. male with a past medical history pertinent for diabetes mellitus type 2 insulin-dependent, bilateral lower extremity femoral popliteal bypass right lower extremity 11/16/2023 and left lower extremity 8/30/2024, CAD, AAA repair, CHF neuropathy of lower extremities, diabetic foot wound of bilateral lower extremities presenting to the vascular surgery service due to degradation of right lower extremity foot wound on the greater toe.  Patient reports that he has had bilateral lower extremity foot wounds and as a result of his vascular disease in combination with neuropathy he has been unable to heal these wounds.  Patient has had bypass of lower extremity as result to optimize vascular flow and efforts for healing his wounds.  Patient had right lower extremity bypass done 11/16/2023 and had shown improvement of vascular flow and wound healing capacity.  Patient's  greater toe had done quite well and healing and was improving.  Over time patient's vascular flow and ABIs have shown degradation.  Patient has had decreased ABIs.  Patient denies any resting pain.  Patient has very mild claudication with walking but does not walk further distances due to decreased endurance secondary to procedures and prolonged immobility from post bypass surgeries and foot wounds.  Patient has no pain currently in his right lower extremity or greater toe.  Patient denies any increased redness or swelling or purulence.  Patient came to the emergency department based on recommendation of his wound care physician who based on witnessing the degradation of the wound with local wound care over the course of the last weeks to months.he required formal evaluation.  Patient was documented to have a lesser stage diabetic foot that now has shown exposure to fat tissue and possibly bone.  Concern was that there was some degree of osteolysis possibly developing.  Patient does still have severe neuropathy with decreased sensation of the lower extremities but has no motor impairment.  With regards to left lower extremity patient had more recent bypass of left lower extremity 8/30/2024.  Patient as well as relatively good flow in the left lower extremity and has shown dramatic wound healing capacity in comparison to the right lower extremity.  Wound care and patient is satisfied with his heel and its progress healing the wound.  Patient has minimal to almost no wound currently present.  Patient does have significant callus overlying the area.  Patient does still have severe neuropathy of that area.  Patient denies any major symptoms of the lower extremities other than some mild claudication with increased activity.  Patient has no other complaints or concerns.  Patient does report history of nausea over the last 48 hours but other than that denies any major issues.     Patient denies any impairment above his  baseline neuropathy of neurovascular impairment or degradation.Please refer to review of systems for any further questions.      Patient expresses after conversation about the risk, benefits, alternatives of limb conservation techniques versus higher level amputation that he does not want to pursue any form of amputation and at this current moment of time.  Patient seems quite shocked that amputation even above the toe would be suggested.  Patient would like to explore higher complexity bypasses or limb flow possibilities if necessary.  Patient based on preference would like to go to [place] as if this were both diagnostic angiogram and therapeutic surgical intervention could take place he finds it to be a more practical solution to just go to 1 place rather than 2.    Review of Systems   Constitutional:  Negative for appetite change, chills, fatigue and fever.   HENT:  Negative for congestion and rhinorrhea.    Respiratory:  Negative for apnea, cough, chest tightness, shortness of breath and wheezing.    Cardiovascular:  Negative for chest pain and palpitations.   Gastrointestinal:  Positive for nausea. Negative for abdominal distention, anal bleeding, constipation, diarrhea and vomiting.   Genitourinary:  Negative for difficulty urinating and flank pain.   Musculoskeletal:  Positive for gait problem. Negative for arthralgias, back pain and myalgias.   Skin:  Positive for wound. Negative for color change.   Neurological:  Positive for numbness. Negative for weakness.   Psychiatric/Behavioral:  Negative for agitation and confusion.      I have reviewed the patient's PMH, PSH, Social History, Family History, Meds, and Allergies  Historical Information   Past Medical History:   Diagnosis Date    Anemia     CAD (coronary artery disease)     Callus     Cancer (HCC)     prostate    CHF (congestive heart failure) (HCC)     Chronic kidney disease     Clotting disorder (HCC)     Coronary artery disease 1988    Deep vein  thrombosis (HCC)     Diabetes mellitus (HCC)     Difficulty walking     Duodenal ulcer     Ear problems     Heart disease 1988    HL (hearing loss)     Hyperlipidemia     Hypertension     Myocardial infarction (HCC)     Neuropathy     Bilateral feet    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)     Plantar fasciitis     Sleep apnea     Could not tolerate CPAP     Past Surgical History:   Procedure Laterality Date    ABDOMINAL AORTIC ANEURYSM REPAIR      Stented    ADENOIDECTOMY      BACK SURGERY  1985    CARDIAC CATHETERIZATION Left 10/19/2022    Procedure: Cardiac Left Heart Cath;  Surgeon: Danielle Pereira MD;  Location: [facility];  Service: Cardiology    CARDIAC ELECTROPHYSIOLOGY PROCEDURE Left 12/16/2024    Procedure: Cardiac pacer implant;  Surgeon: Danielle Pereira MD;  Location: [facility];  Service: Cardiology    CARDIAC SURGERY  2002    3 cardiac bypass then angioplasty 7/2020    CHOLECYSTECTOMY      COLONOSCOPY  02/14/2024    CORONARY ARTERY BYPASS GRAFT      IR LOWER EXTREMITY ANGIOGRAM  11/01/2023    IR LOWER EXTREMITY ANGIOGRAM  08/07/2024    LAMINECTOMY  1990    TX BYPASS W/VEIN FEMORAL-POPLITEAL Right 11/16/2023    Procedure: BYPASS FEMORAL-POPLITEAL WITH CRYO VEIN, RIGHT FEMORAL ENDARTERECTOMY;  Surgeon: Vasquez Clark MD;  Location: [facility];  Service: Vascular    TX BYPASS W/VEIN FEMORAL-POPLITEAL Left 8/30/2024    Procedure: left lower extremity above knee popliteal to below knee popliteal artery bypass with PTFE graft;  Surgeon: Vasquez Clark MD;  Location: [facility];  Service: Vascular    TX SLCTV CATHJ 3RD+ ORD SLCTV ABDL PEL/LXTR BRNC Right 11/01/2023    Procedure: ARTERIOGRAM Right lower extremity arteriogram with CO2 via right groin access;  Surgeon: Vasquez Clark MD;  Location: [facility];  Service: Vascular    TX SLCTV CATHJ 3RD+ ORD SLCTV ABDL PEL/LXTR BRNC Left 08/07/2024    Procedure: diagnostic LLE Arteriogram;  Surgeon: Vasquez  Aram Clark MD;  Location: [facility];  Service: Vascular    PROSTATE SURGERY      TONSILLECTOMY      URINARY SPHINCTER IMPLANT       Social History     Tobacco Use    Smoking status: Former     Current packs/day: 0.00     Average packs/day: 1.5 packs/day for 35.0 years (52.5 ttl pk-yrs)     Types: Cigarettes     Start date: 1956     Quit date:      Years since quittin.0     Passive exposure: Past    Smokeless tobacco: Never   Vaping Use    Vaping status: Never Used   Substance and Sexual Activity    Alcohol use: Yes     Alcohol/week: 14.0 standard drinks of alcohol     Types: 14 Cans of beer per week     Comment: stopped last few months    Drug use: Never    Sexual activity: Not Currently     Partners: Female     Birth control/protection: Male Sterilization     E-Cigarette/Vaping    E-Cigarette Use Never User      E-Cigarette/Vaping Substances    Nicotine No     THC No     CBD No     Flavoring No     Other No     Unknown No      Family history non-contributory  Social History     Tobacco Use    Smoking status: Former     Current packs/day: 0.00     Average packs/day: 1.5 packs/day for 35.0 years (52.5 ttl pk-yrs)     Types: Cigarettes     Start date: 1956     Quit date:      Years since quittin.0     Passive exposure: Past    Smokeless tobacco: Never   Vaping Use    Vaping status: Never Used   Substance and Sexual Activity    Alcohol use: Yes     Alcohol/week: 14.0 standard drinks of alcohol     Types: 14 Cans of beer per week     Comment: stopped last few months    Drug use: Never    Sexual activity: Not Currently     Partners: Female     Birth control/protection: Male Sterilization       Current Facility-Administered Medications:     vancomycin (VANCOCIN) 2,000 mg in sodium chloride 0.9 % 500 mL IVPB, Once, Last Rate: 2,000 mg (25 1514)  Prior to Admission Medications   Prescriptions Last Dose Informant Patient Reported? Taking?   Blood Glucose Monitoring Suppl (OneTouch  Verio Reflect) w/Device KIT  Self No No   Sig: Check blood sugars twice daily. Please substitute with appropriate alternative as covered by patient's insurance. Dx: E11.65   DULoxetine (CYMBALTA) 30 mg delayed release capsule  Self No No   Sig: Take 1 capsule (30 mg total) by mouth daily   Droplet Pen Needles 32G X 4 MM MISC  Self No No   Sig: USE EVERY EVENING   Empagliflozin (Jardiance) 25 MG TABS   No No   Sig: TAKE 1 TABLET BY MOUTH DAILY   Multiple Vitamins-Minerals (MULTIVITAMIN MEN 50+ PO)  Self Yes No   Sig: Take by mouth daily   Omega-3 Fatty Acids (fish oil) 1,000 mg  Self Yes No   Sig: Take 4,000 mg by mouth 2 (two) times a day   OneTouch Delica Lancets 33G MISC  Self No No   Sig: Check blood sugars twice daily. Please substitute with appropriate alternative as covered by patient's insurance. Dx: E11.65   acetaminophen (TYLENOL) 325 mg tablet  Self No No   Sig: Take 2 tablets (650 mg total) by mouth every 6 (six) hours as needed for mild pain   amLODIPine (NORVASC) 2.5 mg tablet   No No   Sig: Take 1 tablet (2.5 mg total) by mouth daily   aspirin 81 mg chewable tablet  Self Yes No   Sig: Chew 81 mg daily   atorvastatin (LIPITOR) 40 mg tablet  Self No No   Sig: TAKE 1 TABLET BY MOUTH DAILY   clopidogrel (PLAVIX) 75 mg tablet  Self No No   Sig: Take 1 tablet (75 mg total) by mouth daily   fenofibrate 160 MG tablet   No No   Sig: TAKE 1 TABLET BY MOUTH DAILY   insulin glargine (LANTUS) 100 units/mL subcutaneous injection  Self No No   Sig: Inject 15 Units under the skin daily at bedtime   isosorbide mononitrate (IMDUR) 30 mg 24 hr tablet  Self No No   Sig: TAKE 3 TABLETS BY MOUTH DAILY   losartan (COZAAR) 25 mg tablet   No No   Sig: Take 1 tablet (25 mg total) by mouth daily   metFORMIN (GLUCOPHAGE) 500 mg tablet  Self No No   Sig: TAKE 1 TABLET BY MOUTH TWICE  DAILY WITH MEALS   metoprolol succinate (TOPROL-XL) 100 mg 24 hr tablet   No No   Sig: Take 1 tablet (100 mg total) by mouth daily   nitroglycerin  (NITROSTAT) 0.4 mg SL tablet  Self No No   Sig: Place 1 tablet (0.4 mg total) under the tongue every 5 (five) minutes as needed for chest pain   pantoprazole (PROTONIX) 40 mg tablet  Self No No   Sig: TAKE 1 TABLET BY MOUTH DAILY   pentoxifylline (TRENtal) 400 mg ER tablet  Self No No   Sig: TAKE 1 TABLET BY MOUTH 3 TIMES  DAILY WITH MEALS   pregabalin (LYRICA) 150 mg capsule  Self No No   Sig: Take 1 capsule (150 mg total) by mouth 3 (three) times a day   ranolazine (RANEXA) 1000 MG SR tablet  Self No No   Sig: Take 1 tablet (1,000 mg total) by mouth 2 (two) times a day   sitaGLIPtin (JANUVIA) 25 mg tablet   No No   Sig: Take 1 tablet (25 mg total) by mouth daily   sodium hypochlorite (DAKIN'S HALF-STRENGTH) external solution  Self No No   Sig: Apply 1 Application topically every other day At each dressing change   Patient not taking: Reported on 12/20/2024   torsemide (DEMADEX) 20 mg tablet  Self No No   Sig: TAKE 1 TABLET BY MOUTH DAILY      Facility-Administered Medications Last Administration Doses Remaining   lidocaine (LMX) 4 % cream 1/3/2025 11:02 AM 0        Lisinopril    Objective :  Temp:  [96.5 °F (35.8 °C)-96.8 °F (36 °C)] 96.8 °F (36 °C)  HR:  [66-73] 66  BP: (111-124)/(62-73) 115/66  Resp:  [18-20] 18  SpO2:  [98 %] 98 %  O2 Device: None (Room air)      Physical Exam  Constitutional:       General: He is awake. He is not in acute distress.     Appearance: Normal appearance. He is normal weight. He is not ill-appearing, toxic-appearing or diaphoretic.      Interventions: He is not intubated.  HENT:      Head: Normocephalic and atraumatic.      Right Ear: Hearing and external ear normal.      Left Ear: Hearing and external ear normal.   Cardiovascular:      Rate and Rhythm: Normal rate and regular rhythm.      Pulses:           Femoral pulses are 2+ on the right side and 2+ on the left side.       Dorsalis pedis pulses are detected w/ Doppler on the right side and detected w/ Doppler on the left side.         Posterior tibial pulses are 0 on the right side and detected w/ Doppler on the left side.      Heart sounds: Normal heart sounds, S1 normal and S2 normal. Heart sounds not distant. No murmur heard.  Pulmonary:      Effort: Pulmonary effort is normal. No tachypnea, bradypnea, accessory muscle usage, prolonged expiration, respiratory distress or retractions. He is not intubated.      Breath sounds: Normal breath sounds. No stridor, decreased air movement or transmitted upper airway sounds. No decreased breath sounds, wheezing, rhonchi or rales.   Abdominal:      General: Abdomen is flat. There is no distension.      Palpations: Abdomen is soft.      Tenderness: There is no abdominal tenderness. There is no guarding.   Musculoskeletal:      Right lower leg: No edema.      Left lower leg: No edema.      Right foot: Normal range of motion.      Left foot: Normal range of motion.   Feet:      Right foot:      Protective Sensation: 4 sites tested.   1 site sensed.     Skin integrity: Ulcer, skin breakdown, callus and dry skin present. No erythema or warmth.      Left foot:      Protective Sensation: 4 sites tested.   1 site sensed.     Skin integrity: Callus and dry skin present.   Skin:     Findings: Wound present.          Neurological:      Sensory: Sensory deficit present.      Motor: Motor function is intact.   Psychiatric:         Behavior: Behavior is cooperative.                   Lab Results: I have reviewed the following results:  Recent Labs     01/03/25  1459   WBC 10.06   HGB 14.3   HCT 45.3      SODIUM 135   K 4.5      CO2 30   BUN 34*   CREATININE 1.73*   GLUC 138       Imaging Results Review: I reviewed radiology reports from this admission including: Ultrasound(s).arterial lead  Other Study Results Review: No additional pertinent studies reviewed.      VTE Mechanical Prophylaxis: sequential compression device    Administrative Statements   Approximately 60 minutes spent with patient  coordinating care, discussing with vascular surgeon, coordinating with the emergency department, cording with internal medicine, coordinating with interventional radiology, reviewing chart, reviewing history, reviewing labs, reviewing imaging, physical exam, patient education, documentation

## 2025-01-04 ENCOUNTER — HOSPITAL ENCOUNTER (INPATIENT)
Facility: HOSPITAL | Age: 80
LOS: 5 days | Discharge: HOME | DRG: 299 | End: 2025-01-09
Attending: INTERNAL MEDICINE | Admitting: INTERNAL MEDICINE
Payer: COMMERCIAL

## 2025-01-04 VITALS
TEMPERATURE: 99 F | RESPIRATION RATE: 20 BRPM | BODY MASS INDEX: 28.27 KG/M2 | HEIGHT: 74 IN | WEIGHT: 220.25 LBS | HEART RATE: 64 BPM | DIASTOLIC BLOOD PRESSURE: 55 MMHG | OXYGEN SATURATION: 95 % | SYSTOLIC BLOOD PRESSURE: 113 MMHG

## 2025-01-04 DIAGNOSIS — R78.81 BACTEREMIA: Primary | ICD-10-CM

## 2025-01-04 DIAGNOSIS — M89.9 CHRONIC KIDNEY DISEASE-MINERAL AND BONE DISORDER: ICD-10-CM

## 2025-01-04 DIAGNOSIS — L97.519 ULCER OF RIGHT FOOT, UNSPECIFIED ULCER STAGE (HCC): ICD-10-CM

## 2025-01-04 DIAGNOSIS — E78.2 MIXED HYPERLIPIDEMIA: ICD-10-CM

## 2025-01-04 DIAGNOSIS — N18.9 CHRONIC KIDNEY DISEASE-MINERAL AND BONE DISORDER: ICD-10-CM

## 2025-01-04 DIAGNOSIS — I25.10 CORONARY ARTERY DISEASE INVOLVING NATIVE CORONARY ARTERY OF NATIVE HEART WITHOUT ANGINA PECTORIS: ICD-10-CM

## 2025-01-04 DIAGNOSIS — L97.509 FOOT ULCER (HCC): ICD-10-CM

## 2025-01-04 DIAGNOSIS — E83.9 CHRONIC KIDNEY DISEASE-MINERAL AND BONE DISORDER: ICD-10-CM

## 2025-01-04 DIAGNOSIS — R78.81 GRAM-POSITIVE BACTEREMIA: ICD-10-CM

## 2025-01-04 LAB
ANION GAP SERPL CALCULATED.3IONS-SCNC: 5 MMOL/L (ref 4–13)
BUN SERPL-MCNC: 42 MG/DL (ref 5–25)
CALCIUM SERPL-MCNC: 8.5 MG/DL (ref 8.4–10.2)
CHLORIDE SERPL-SCNC: 100 MMOL/L (ref 96–108)
CO2 SERPL-SCNC: 28 MMOL/L (ref 21–32)
CREAT SERPL-MCNC: 2.45 MG/DL (ref 0.6–1.3)
ERYTHROCYTE [DISTWIDTH] IN BLOOD BY AUTOMATED COUNT: 14.6 % (ref 11.6–15.1)
GFR SERPL CREATININE-BSD FRML MDRD: 23 ML/MIN/1.73SQ M
GLUCOSE SERPL-MCNC: 109 MG/DL (ref 65–140)
GLUCOSE SERPL-MCNC: 111 MG/DL (ref 65–140)
GLUCOSE SERPL-MCNC: 181 MG/DL (ref 65–140)
GLUCOSE SERPL-MCNC: 200 MG/DL (ref 65–140)
GLUCOSE SERPL-MCNC: 211 MG/DL (ref 65–140)
HCT VFR BLD AUTO: 36.4 % (ref 36.5–49.3)
HGB BLD-MCNC: 11.9 G/DL (ref 12–17)
MAGNESIUM SERPL-MCNC: 1.5 MG/DL (ref 1.9–2.7)
MCH RBC QN AUTO: 27.4 PG (ref 26.8–34.3)
MCHC RBC AUTO-ENTMCNC: 32.7 G/DL (ref 31.4–37.4)
MCV RBC AUTO: 84 FL (ref 82–98)
PLATELET # BLD AUTO: 224 THOUSANDS/UL (ref 149–390)
PMV BLD AUTO: 10.8 FL (ref 8.9–12.7)
POTASSIUM SERPL-SCNC: 3.8 MMOL/L (ref 3.5–5.3)
RBC # BLD AUTO: 4.34 MILLION/UL (ref 3.88–5.62)
SODIUM SERPL-SCNC: 133 MMOL/L (ref 135–147)
WBC # BLD AUTO: 13.98 THOUSAND/UL (ref 4.31–10.16)

## 2025-01-04 PROCEDURE — 99232 SBSQ HOSP IP/OBS MODERATE 35: CPT | Performed by: SURGERY

## 2025-01-04 PROCEDURE — 83735 ASSAY OF MAGNESIUM: CPT | Performed by: NURSE PRACTITIONER

## 2025-01-04 PROCEDURE — 85027 COMPLETE CBC AUTOMATED: CPT | Performed by: NURSE PRACTITIONER

## 2025-01-04 PROCEDURE — 82948 REAGENT STRIP/BLOOD GLUCOSE: CPT

## 2025-01-04 PROCEDURE — 99223 1ST HOSP IP/OBS HIGH 75: CPT | Performed by: INTERNAL MEDICINE

## 2025-01-04 PROCEDURE — 87040 BLOOD CULTURE FOR BACTERIA: CPT | Performed by: INTERNAL MEDICINE

## 2025-01-04 PROCEDURE — 80048 BASIC METABOLIC PNL TOTAL CA: CPT | Performed by: NURSE PRACTITIONER

## 2025-01-04 PROCEDURE — 99239 HOSP IP/OBS DSCHRG MGMT >30: CPT | Performed by: INTERNAL MEDICINE

## 2025-01-04 RX ORDER — PANTOPRAZOLE SODIUM 40 MG/1
40 TABLET, DELAYED RELEASE ORAL
Status: CANCELLED | OUTPATIENT
Start: 2025-01-05

## 2025-01-04 RX ORDER — INSULIN GLARGINE 100 [IU]/ML
15 INJECTION, SOLUTION SUBCUTANEOUS
Status: DISCONTINUED | OUTPATIENT
Start: 2025-01-04 | End: 2025-01-05

## 2025-01-04 RX ORDER — CEFAZOLIN SODIUM 2 G/50ML
2000 SOLUTION INTRAVENOUS EVERY 8 HOURS
Status: CANCELLED | OUTPATIENT
Start: 2025-01-04

## 2025-01-04 RX ORDER — HEPARIN SODIUM 5000 [USP'U]/ML
5000 INJECTION, SOLUTION INTRAVENOUS; SUBCUTANEOUS EVERY 8 HOURS SCHEDULED
Status: CANCELLED | OUTPATIENT
Start: 2025-01-04

## 2025-01-04 RX ORDER — OXYCODONE HYDROCHLORIDE 5 MG/1
5 TABLET ORAL EVERY 12 HOURS PRN
Refills: 0 | Status: CANCELLED | OUTPATIENT
Start: 2025-01-04

## 2025-01-04 RX ORDER — INSULIN GLARGINE 100 [IU]/ML
15 INJECTION, SOLUTION SUBCUTANEOUS
Status: CANCELLED | OUTPATIENT
Start: 2025-01-04

## 2025-01-04 RX ORDER — CHLORAL HYDRATE 500 MG
1000 CAPSULE ORAL 2 TIMES DAILY
Status: CANCELLED | OUTPATIENT
Start: 2025-01-04

## 2025-01-04 RX ORDER — CLOPIDOGREL BISULFATE 75 MG/1
75 TABLET ORAL DAILY
Status: CANCELLED | OUTPATIENT
Start: 2025-01-05

## 2025-01-04 RX ORDER — PANTOPRAZOLE SODIUM 40 MG/1
40 TABLET, DELAYED RELEASE ORAL
Status: DISCONTINUED | OUTPATIENT
Start: 2025-01-05 | End: 2025-01-09 | Stop reason: HOSPADM

## 2025-01-04 RX ORDER — HEPARIN SODIUM 5000 [USP'U]/ML
5000 INJECTION, SOLUTION INTRAVENOUS; SUBCUTANEOUS EVERY 8 HOURS SCHEDULED
Status: DISCONTINUED | OUTPATIENT
Start: 2025-01-04 | End: 2025-01-09 | Stop reason: HOSPADM

## 2025-01-04 RX ORDER — PREGABALIN 75 MG/1
150 CAPSULE ORAL 3 TIMES DAILY
Status: CANCELLED | OUTPATIENT
Start: 2025-01-04

## 2025-01-04 RX ORDER — METOPROLOL SUCCINATE 100 MG/1
100 TABLET, EXTENDED RELEASE ORAL DAILY
Status: CANCELLED | OUTPATIENT
Start: 2025-01-05

## 2025-01-04 RX ORDER — FENOFIBRATE 145 MG/1
145 TABLET, COATED ORAL DAILY
Status: DISCONTINUED | OUTPATIENT
Start: 2025-01-05 | End: 2025-01-09 | Stop reason: HOSPADM

## 2025-01-04 RX ORDER — RANOLAZINE 500 MG/1
1000 TABLET, EXTENDED RELEASE ORAL 2 TIMES DAILY
Status: CANCELLED | OUTPATIENT
Start: 2025-01-04

## 2025-01-04 RX ORDER — TORSEMIDE 20 MG/1
20 TABLET ORAL DAILY
Status: DISCONTINUED | OUTPATIENT
Start: 2025-01-05 | End: 2025-01-08

## 2025-01-04 RX ORDER — CLOPIDOGREL BISULFATE 75 MG/1
75 TABLET ORAL DAILY
Status: DISCONTINUED | OUTPATIENT
Start: 2025-01-05 | End: 2025-01-09 | Stop reason: HOSPADM

## 2025-01-04 RX ORDER — AMLODIPINE BESYLATE 2.5 MG/1
2.5 TABLET ORAL DAILY
Status: DISCONTINUED | OUTPATIENT
Start: 2025-01-05 | End: 2025-01-09 | Stop reason: HOSPADM

## 2025-01-04 RX ORDER — SODIUM CHLORIDE 9 MG/ML
50 INJECTION, SOLUTION INTRAVENOUS CONTINUOUS
Status: DISCONTINUED | OUTPATIENT
Start: 2025-01-04 | End: 2025-01-05

## 2025-01-04 RX ORDER — FENOFIBRATE 145 MG/1
145 TABLET, COATED ORAL DAILY
Status: CANCELLED | OUTPATIENT
Start: 2025-01-05

## 2025-01-04 RX ORDER — NITROGLYCERIN 0.4 MG/1
0.4 TABLET SUBLINGUAL
Status: CANCELLED | OUTPATIENT
Start: 2025-01-04

## 2025-01-04 RX ORDER — TORSEMIDE 20 MG/1
20 TABLET ORAL DAILY
Status: CANCELLED | OUTPATIENT
Start: 2025-01-05

## 2025-01-04 RX ORDER — INSULIN LISPRO 100 [IU]/ML
1-5 INJECTION, SOLUTION INTRAVENOUS; SUBCUTANEOUS
Status: DISCONTINUED | OUTPATIENT
Start: 2025-01-04 | End: 2025-01-09 | Stop reason: HOSPADM

## 2025-01-04 RX ORDER — INSULIN LISPRO 100 [IU]/ML
1-5 INJECTION, SOLUTION INTRAVENOUS; SUBCUTANEOUS
Status: CANCELLED | OUTPATIENT
Start: 2025-01-04

## 2025-01-04 RX ORDER — HEPARIN SODIUM 5000 [USP'U]/ML
5000 INJECTION, SOLUTION INTRAVENOUS; SUBCUTANEOUS EVERY 8 HOURS SCHEDULED
Status: DISCONTINUED | OUTPATIENT
Start: 2025-01-04 | End: 2025-01-04 | Stop reason: HOSPADM

## 2025-01-04 RX ORDER — RANOLAZINE 500 MG/1
1000 TABLET, EXTENDED RELEASE ORAL 2 TIMES DAILY
Status: DISCONTINUED | OUTPATIENT
Start: 2025-01-05 | End: 2025-01-09 | Stop reason: HOSPADM

## 2025-01-04 RX ORDER — INSULIN LISPRO 100 [IU]/ML
1-5 INJECTION, SOLUTION INTRAVENOUS; SUBCUTANEOUS
Status: DISCONTINUED | OUTPATIENT
Start: 2025-01-05 | End: 2025-01-09 | Stop reason: HOSPADM

## 2025-01-04 RX ORDER — DULOXETIN HYDROCHLORIDE 30 MG/1
30 CAPSULE, DELAYED RELEASE ORAL DAILY
Status: DISCONTINUED | OUTPATIENT
Start: 2025-01-05 | End: 2025-01-09 | Stop reason: HOSPADM

## 2025-01-04 RX ORDER — INSULIN LISPRO 100 [IU]/ML
1-5 INJECTION, SOLUTION INTRAVENOUS; SUBCUTANEOUS
Status: CANCELLED | OUTPATIENT
Start: 2025-01-05

## 2025-01-04 RX ORDER — AMLODIPINE BESYLATE 2.5 MG/1
2.5 TABLET ORAL DAILY
Status: CANCELLED | OUTPATIENT
Start: 2025-01-05

## 2025-01-04 RX ORDER — ATORVASTATIN CALCIUM 40 MG/1
40 TABLET, FILM COATED ORAL
Status: DISCONTINUED | OUTPATIENT
Start: 2025-01-04 | End: 2025-01-09 | Stop reason: HOSPADM

## 2025-01-04 RX ORDER — METOPROLOL SUCCINATE 100 MG/1
100 TABLET, EXTENDED RELEASE ORAL DAILY
Status: DISCONTINUED | OUTPATIENT
Start: 2025-01-05 | End: 2025-01-09 | Stop reason: HOSPADM

## 2025-01-04 RX ORDER — SODIUM CHLORIDE 9 MG/ML
50 INJECTION, SOLUTION INTRAVENOUS CONTINUOUS
Status: DISCONTINUED | OUTPATIENT
Start: 2025-01-04 | End: 2025-01-04 | Stop reason: HOSPADM

## 2025-01-04 RX ORDER — OXYCODONE HYDROCHLORIDE 5 MG/1
5 TABLET ORAL EVERY 12 HOURS PRN
Refills: 0 | Status: DISCONTINUED | OUTPATIENT
Start: 2025-01-04 | End: 2025-01-04 | Stop reason: HOSPADM

## 2025-01-04 RX ORDER — CEFAZOLIN SODIUM 2 G/50ML
2000 SOLUTION INTRAVENOUS EVERY 8 HOURS
Status: DISCONTINUED | OUTPATIENT
Start: 2025-01-04 | End: 2025-01-05

## 2025-01-04 RX ORDER — DULOXETIN HYDROCHLORIDE 30 MG/1
30 CAPSULE, DELAYED RELEASE ORAL DAILY
Status: CANCELLED | OUTPATIENT
Start: 2025-01-05

## 2025-01-04 RX ORDER — PENTOXIFYLLINE 400 MG/1
400 TABLET, EXTENDED RELEASE ORAL
Status: CANCELLED | OUTPATIENT
Start: 2025-01-05

## 2025-01-04 RX ORDER — NITROGLYCERIN 0.4 MG/1
0.4 TABLET SUBLINGUAL
Status: DISCONTINUED | OUTPATIENT
Start: 2025-01-04 | End: 2025-01-09 | Stop reason: HOSPADM

## 2025-01-04 RX ORDER — CHLORAL HYDRATE 500 MG
1000 CAPSULE ORAL 2 TIMES DAILY
Status: DISCONTINUED | OUTPATIENT
Start: 2025-01-05 | End: 2025-01-09 | Stop reason: HOSPADM

## 2025-01-04 RX ORDER — HYDROMORPHONE HCL/PF 1 MG/ML
0.5 SYRINGE (ML) INJECTION EVERY 4 HOURS PRN
Refills: 0 | Status: CANCELLED | OUTPATIENT
Start: 2025-01-04

## 2025-01-04 RX ORDER — OXYCODONE HYDROCHLORIDE 5 MG/1
5 TABLET ORAL EVERY 12 HOURS PRN
Refills: 0 | Status: DISCONTINUED | OUTPATIENT
Start: 2025-01-04 | End: 2025-01-09 | Stop reason: HOSPADM

## 2025-01-04 RX ORDER — OXYCODONE AND ACETAMINOPHEN 5; 325 MG/1; MG/1
1 TABLET ORAL 2 TIMES DAILY PRN
COMMUNITY

## 2025-01-04 RX ORDER — HYDROMORPHONE HCL/PF 1 MG/ML
0.5 SYRINGE (ML) INJECTION EVERY 4 HOURS PRN
Refills: 0 | Status: DISCONTINUED | OUTPATIENT
Start: 2025-01-04 | End: 2025-01-09 | Stop reason: HOSPADM

## 2025-01-04 RX ORDER — HYDROMORPHONE HCL/PF 1 MG/ML
0.5 SYRINGE (ML) INJECTION EVERY 4 HOURS PRN
Refills: 0 | Status: DISCONTINUED | OUTPATIENT
Start: 2025-01-04 | End: 2025-01-04 | Stop reason: HOSPADM

## 2025-01-04 RX ORDER — PREGABALIN 75 MG/1
150 CAPSULE ORAL 3 TIMES DAILY
Status: DISCONTINUED | OUTPATIENT
Start: 2025-01-04 | End: 2025-01-09 | Stop reason: HOSPADM

## 2025-01-04 RX ORDER — PENTOXIFYLLINE 400 MG/1
400 TABLET, EXTENDED RELEASE ORAL
Status: DISCONTINUED | OUTPATIENT
Start: 2025-01-05 | End: 2025-01-09 | Stop reason: HOSPADM

## 2025-01-04 RX ORDER — ATORVASTATIN CALCIUM 40 MG/1
40 TABLET, FILM COATED ORAL
Status: CANCELLED | OUTPATIENT
Start: 2025-01-04

## 2025-01-04 RX ORDER — SODIUM CHLORIDE 9 MG/ML
50 INJECTION, SOLUTION INTRAVENOUS CONTINUOUS
Status: CANCELLED | OUTPATIENT
Start: 2025-01-04 | End: 2025-01-05

## 2025-01-04 RX ADMIN — INSULIN LISPRO 2 UNITS: 100 INJECTION, SOLUTION INTRAVENOUS; SUBCUTANEOUS at 11:48

## 2025-01-04 RX ADMIN — HYDROMORPHONE HYDROCHLORIDE 0.5 MG: 1 INJECTION, SOLUTION INTRAMUSCULAR; INTRAVENOUS; SUBCUTANEOUS at 12:37

## 2025-01-04 RX ADMIN — INSULIN GLARGINE 15 UNITS: 100 INJECTION, SOLUTION SUBCUTANEOUS at 22:19

## 2025-01-04 RX ADMIN — CEFAZOLIN SODIUM 2000 MG: 2 SOLUTION INTRAVENOUS at 09:12

## 2025-01-04 RX ADMIN — PREGABALIN 150 MG: 75 CAPSULE ORAL at 22:20

## 2025-01-04 RX ADMIN — VANCOMYCIN HYDROCHLORIDE 2000 MG: 10 INJECTION, POWDER, LYOPHILIZED, FOR SOLUTION INTRAVENOUS at 22:24

## 2025-01-04 RX ADMIN — PENTOXIFYLLINE 400 MG: 400 TABLET, EXTENDED RELEASE ORAL at 09:10

## 2025-01-04 RX ADMIN — ATORVASTATIN CALCIUM 40 MG: 40 TABLET, FILM COATED ORAL at 22:20

## 2025-01-04 RX ADMIN — SODIUM CHLORIDE 50 ML/HR: 0.9 INJECTION, SOLUTION INTRAVENOUS at 17:37

## 2025-01-04 RX ADMIN — Medication 1 TABLET: at 09:09

## 2025-01-04 RX ADMIN — CEFAZOLIN SODIUM 2000 MG: 2 SOLUTION INTRAVENOUS at 15:26

## 2025-01-04 RX ADMIN — OMEGA-3 FATTY ACIDS CAP 1000 MG 1000 MG: 1000 CAP at 09:10

## 2025-01-04 RX ADMIN — RANOLAZINE 1000 MG: 500 TABLET, FILM COATED, EXTENDED RELEASE ORAL at 09:15

## 2025-01-04 RX ADMIN — OXYCODONE HYDROCHLORIDE 5 MG: 5 TABLET ORAL at 01:50

## 2025-01-04 RX ADMIN — DULOXETINE HYDROCHLORIDE 30 MG: 30 CAPSULE, DELAYED RELEASE ORAL at 09:10

## 2025-01-04 RX ADMIN — ENOXAPARIN SODIUM 30 MG: 30 INJECTION SUBCUTANEOUS at 09:09

## 2025-01-04 RX ADMIN — PENTOXIFYLLINE 400 MG: 400 TABLET, EXTENDED RELEASE ORAL at 16:18

## 2025-01-04 RX ADMIN — EMPAGLIFLOZIN 20 MG: 10 TABLET, FILM COATED ORAL at 09:13

## 2025-01-04 RX ADMIN — HEPARIN SODIUM 5000 UNITS: 5000 INJECTION, SOLUTION INTRAVENOUS; SUBCUTANEOUS at 22:20

## 2025-01-04 RX ADMIN — RANOLAZINE 1000 MG: 500 TABLET, FILM COATED, EXTENDED RELEASE ORAL at 17:36

## 2025-01-04 RX ADMIN — OMEGA-3 FATTY ACIDS CAP 1000 MG 1000 MG: 1000 CAP at 17:35

## 2025-01-04 RX ADMIN — SODIUM CHLORIDE 50 ML/HR: 0.9 INJECTION, SOLUTION INTRAVENOUS at 22:06

## 2025-01-04 RX ADMIN — PANTOPRAZOLE SODIUM 40 MG: 40 TABLET, DELAYED RELEASE ORAL at 09:24

## 2025-01-04 RX ADMIN — INSULIN LISPRO 1 UNITS: 100 INJECTION, SOLUTION INTRAVENOUS; SUBCUTANEOUS at 22:19

## 2025-01-04 RX ADMIN — FENOFIBRATE 145 MG: 145 TABLET, FILM COATED ORAL at 09:15

## 2025-01-04 RX ADMIN — PREGABALIN 150 MG: 75 CAPSULE ORAL at 16:18

## 2025-01-04 RX ADMIN — PREGABALIN 150 MG: 75 CAPSULE ORAL at 09:14

## 2025-01-04 RX ADMIN — HEPARIN SODIUM 5000 UNITS: 5000 INJECTION, SOLUTION INTRAVENOUS; SUBCUTANEOUS at 17:36

## 2025-01-04 RX ADMIN — PENTOXIFYLLINE 400 MG: 400 TABLET, EXTENDED RELEASE ORAL at 11:48

## 2025-01-04 RX ADMIN — CLOPIDOGREL 75 MG: 75 TABLET ORAL at 09:09

## 2025-01-04 RX ADMIN — ISOSORBIDE MONONITRATE 90 MG: 60 TABLET, EXTENDED RELEASE ORAL at 09:11

## 2025-01-04 RX ADMIN — SITAGLIPTIN 25 MG: 25 TABLET, FILM COATED ORAL at 09:11

## 2025-01-04 RX ADMIN — INSULIN LISPRO 1 UNITS: 100 INJECTION, SOLUTION INTRAVENOUS; SUBCUTANEOUS at 16:19

## 2025-01-04 NOTE — ASSESSMENT & PLAN NOTE
Baseline creatinine appears to be around 1.0-1.6 since end of August 2024  Creatinine today 1.73, close to baseline  Hold losartan  Gentle IV hydration for 10 hours  Avoid nephrotoxins and hypotension  Repeat BMP in the morning

## 2025-01-04 NOTE — PLAN OF CARE
Problem: PAIN - ADULT  Goal: Verbalizes/displays adequate comfort level or baseline comfort level  Description: Interventions:  - Encourage patient to monitor pain and request assistance  - Assess pain using appropriate pain scale  - Administer analgesics based on type and severity of pain and evaluate response  - Implement non-pharmacological measures as appropriate and evaluate response  - Consider cultural and social influences on pain and pain management  - Notify physician/advanced practitioner if interventions unsuccessful or patient reports new pain  Outcome: Progressing     Problem: INFECTION - ADULT  Goal: Absence or prevention of progression during hospitalization  Description: INTERVENTIONS:  - Assess and monitor for signs and symptoms of infection  - Monitor lab/diagnostic results  - Monitor all insertion sites, i.e. indwelling lines, tubes, and drains  - Monitor endotracheal if appropriate and nasal secretions for changes in amount and color  - [place] appropriate cooling/warming therapies per order  - Administer medications as ordered  - Instruct and encourage patient and family to use good hand hygiene technique  - Identify and instruct in appropriate isolation precautions for identified infection/condition  Outcome: Progressing  Goal: Absence of fever/infection during neutropenic period  Description: INTERVENTIONS:  - Monitor WBC    Outcome: Progressing     Problem: SAFETY ADULT  Goal: Patient will remain free of falls  Description: INTERVENTIONS:  - Educate patient/family on patient safety including physical limitations  - Instruct patient to call for assistance with activity   - Consult OT/PT to assist with strengthening/mobility   - Keep Call bell within reach  - Keep bed low and locked with side rails adjusted as appropriate  - Keep care items and personal belongings within reach  - Initiate and maintain comfort rounds  - Make Fall Risk Sign visible to staff  - Offer Toileting every 2 Hours,  in advance of need  - Initiate/Maintain bed alarm  - Obtain necessary fall risk management equipment: call bell   - Apply yellow socks and bracelet for high fall risk patients  - Consider moving patient to room near nurses station  Outcome: Progressing  Goal: Maintain or return to baseline ADL function  Description: INTERVENTIONS:  -  Assess patient's ability to carry out ADLs; assess patient's baseline for ADL function and identify physical deficits which impact ability to perform ADLs (bathing, care of mouth/teeth, toileting, grooming, dressing, etc.)  - Assess/evaluate cause of self-care deficits   - Assess range of motion  - Assess patient's mobility; develop plan if impaired  - Assess patient's need for assistive devices and provide as appropriate  - Encourage maximum independence but intervene and supervise when necessary  - Involve family in performance of ADLs  - Assess for home care needs following discharge   - Consider OT consult to assist with ADL evaluation and planning for discharge  - Provide patient education as appropriate  Outcome: Progressing  Goal: Maintains/Returns to pre admission functional level  Description: INTERVENTIONS:  - Perform AM-PAC 6 Click Basic Mobility/ Daily Activity assessment daily.  - Set and communicate daily mobility goal to care team and patient/family/caregiver.   - Collaborate with rehabilitation services on mobility goals if consulted  - Perform Range of Motion 3 times a day.  - Reposition patient every 3 hours.  - Dangle patient 3 times a day  - Stand patient 3 times a day  - Ambulate patient 3 times a day  - Out of bed to chair 3 times a day   - Out of bed for meals 3 times a day  - Out of bed for toileting  - Record patient progress and toleration of activity level   Outcome: Progressing     Problem: DISCHARGE PLANNING  Goal: Discharge to home or other facility with appropriate resources  Description: INTERVENTIONS:  - Identify barriers to discharge w/patient and  caregiver  - Arrange for needed discharge resources and transportation as appropriate  - Identify discharge learning needs (meds, wound care, etc.)  - Arrange for interpretive services to assist at discharge as needed  - Refer to Case Management Department for coordinating discharge planning if the patient needs post-hospital services based on physician/advanced practitioner order or complex needs related to functional status, cognitive ability, or social support system  Outcome: Progressing     Problem: Knowledge Deficit  Goal: Patient/family/caregiver demonstrates understanding of disease process, treatment plan, medications, and discharge instructions  Description: Complete learning assessment and assess knowledge base.  Interventions:  - Provide teaching at level of understanding  - Provide teaching via preferred learning methods  Outcome: Progressing     Problem: METABOLIC, FLUID AND ELECTROLYTES - ADULT  Goal: Electrolytes maintained within normal limits  Description: INTERVENTIONS:  - Monitor labs and assess patient for signs and symptoms of electrolyte imbalances  - Administer electrolyte replacement as ordered  - Monitor response to electrolyte replacements, including repeat lab results as appropriate  - Instruct patient on fluid and nutrition as appropriate  Outcome: Progressing  Goal: Fluid balance maintained  Description: INTERVENTIONS:  - Monitor labs   - Monitor I/O and WT  - Instruct patient on fluid and nutrition as appropriate  - Assess for signs & symptoms of volume excess or deficit  Outcome: Progressing  Goal: Glucose maintained within target range  Description: INTERVENTIONS:  - Monitor Blood Glucose as ordered  - Assess for signs and symptoms of hyperglycemia and hypoglycemia  - Administer ordered medications to maintain glucose within target range  - Assess nutritional intake and initiate nutrition service referral as needed  Outcome: Progressing     Problem: SKIN/TISSUE INTEGRITY -  ADULT  Goal: Incision(s), wounds(s) or drain site(s) healing without S/S of infection  Description: INTERVENTIONS  - Assess and document dressing, incision, wound bed, drain sites and surrounding tissue  - Provide patient and family education  - Perform skin care/dressing changes every shift   Outcome: Progressing  Goal: Pressure injury heals and does not worsen  Description: Interventions:  - Implement low air loss mattress or specialty surface (Criteria met)  - Apply silicone foam dressing  - Instruct/assist with weight shifting every 60 minutes when in chair   - Limit chair time to 1 hour intervals  - Use special pressure reducing interventions such as ehob cushion  when in chair   - Apply fecal or urinary incontinence containment device   - Perform passive or active ROM every shift   - Turn and reposition patient & offload bony prominences every 2 hours   - Utilize friction reducing device or surface for transfers   - Consider consults to  interdisciplinary teams such as wound care team   - Use incontinent care products after each incontinent episode   - Consider nutrition services referral as needed  Outcome: Progressing

## 2025-01-04 NOTE — ASSESSMENT & PLAN NOTE
Lab Results   Component Value Date    HGBA1C 6.2 (H) 10/14/2024       Recent Labs     01/03/25  2312 01/04/25  0710 01/04/25  1053 01/04/25  1617   POCGLU 152* 109 211* 200*       Blood Sugar Average: Last 72 hrs:      Patient presents with worsening right great toe wound from wound care center.  Patient reports wound started about 6 weeks ago likely due to cat scratch initially. Goes to wound care center for hyperbaric therapy daily Monday to Friday.  Patient reports wound care doctor concerned about infection in the bone.  Patient also reports he has very poor circulation to right foot per vascular doctor.  Chart reviewed.  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  Left lower limb LEAD showed FABIOLA 0.79 which is in the moderate disease category, great toe pressure of 51 which is borderline within the healing range.  Wound care provider documented clinically evident osteomyelitis present at the base of the wound, small amount of bone debrided from the wound today.    Patient was seen by vascular surgery in ED, recommends diagnostic angiogram, recommends to transfer patient to [place] for arteriogram and consideration for bypass redo.   Patient currently pending bed availability at [place]  X-ray right foot no underlying osseous changes to suggest osteomyelitis, recommend MRI if clinically warranted.  WBC normal, no bands.  Patient afebrile.  ESR 52, CRP 10.7.  Denies history of MRSA.  Patient was started on Ancef 2 g IV every 8 hours as per admitting provider discussion with ID  Wound culture  pending  Continue local wound care  Continue Plavix Lipitor Trental  Pending podiatry and ID consults  Defer MRI to podiatry.  Patient has a newly placed pacemaker.

## 2025-01-04 NOTE — PROGRESS NOTES
Thomas Horne is a 79 y.o. male who is currently ordered Vancomycin IV with management by the Pharmacy Consult service.  Pulse dosing while in IVY  Relevant clinical data and objective / subjective history reviewed.  Vancomycin Assessment:  Indication and Goal AUC/Trough: Bacteremia (goal -600, trough >10)  Clinical Status: stable  Micro:   Bcx + baceteremia  Renal Function:  SCr: 2.45 mg/dL  CrCl: 29.9 mL/min  Renal replacement: Not on dialysis  Days of Therapy: 1  Current Dose: loading dose 20 mg/kg on 1/4/25 15:14.  Pulse dosing while in IVY  Vancomycin Plan:  New Dosing:  Pulse dosing while in IVY  Estimated AUC:  mcg*hr/mL  Estimated Trough:  mcg/mL  Next Level: 01/05/2025 14:15  Renal Function Monitoring: Daily BMP and UOP  Pharmacy will continue to follow closely for s/sx of nephrotoxicity, infusion reactions and appropriateness of therapy.  BMP and CBC will be ordered per protocol. We will continue to follow the patient’s culture results and clinical progress daily.    Jose Kothari, Pharmacist

## 2025-01-04 NOTE — PLAN OF CARE
Problem: PAIN - ADULT  Goal: Verbalizes/displays adequate comfort level or baseline comfort level  Description: Interventions:  - Encourage patient to monitor pain and request assistance  - Assess pain using appropriate pain scale  - Administer analgesics based on type and severity of pain and evaluate response  - Implement non-pharmacological measures as appropriate and evaluate response  - Consider cultural and social influences on pain and pain management  - Notify physician/advanced practitioner if interventions unsuccessful or patient reports new pain  Outcome: Progressing     Problem: INFECTION - ADULT  Goal: Absence or prevention of progression during hospitalization  Description: INTERVENTIONS:  - Assess and monitor for signs and symptoms of infection  - Monitor lab/diagnostic results  - Monitor all insertion sites, i.e. indwelling lines, tubes, and drains  - Monitor endotracheal if appropriate and nasal secretions for changes in amount and color  - [place] appropriate cooling/warming therapies per order  - Administer medications as ordered  - Instruct and encourage patient and family to use good hand hygiene technique  - Identify and instruct in appropriate isolation precautions for identified infection/condition  Outcome: Progressing  Goal: Absence of fever/infection during neutropenic period  Description: INTERVENTIONS:  - Monitor WBC    Outcome: Progressing     Problem: SAFETY ADULT  Goal: Patient will remain free of falls  Description: INTERVENTIONS:  - Educate patient/family on patient safety including physical limitations  - Instruct patient to call for assistance with activity   - Consult OT/PT to assist with strengthening/mobility   - Keep Call bell within reach  - Keep bed low and locked with side rails adjusted as appropriate  - Keep care items and personal belongings within reach  - Initiate and maintain comfort rounds  - Make Fall Risk Sign visible to staff  - Offer Toileting every 2 Hours,  in advance of need  - Initiate/Maintain bed alarm  - Obtain necessary fall risk management equipment:   - Apply yellow socks and bracelet for high fall risk patients  - Consider moving patient to room near nurses station  Outcome: Progressing  Goal: Maintain or return to baseline ADL function  Description: INTERVENTIONS:  -  Assess patient's ability to carry out ADLs; assess patient's baseline for ADL function and identify physical deficits which impact ability to perform ADLs (bathing, care of mouth/teeth, toileting, grooming, dressing, etc.)  - Assess/evaluate cause of self-care deficits   - Assess range of motion  - Assess patient's mobility; develop plan if impaired  - Assess patient's need for assistive devices and provide as appropriate  - Encourage maximum independence but intervene and supervise when necessary  - Involve family in performance of ADLs  - Assess for home care needs following discharge   - Consider OT consult to assist with ADL evaluation and planning for discharge  - Provide patient education as appropriate  Outcome: Progressing  Goal: Maintains/Returns to pre admission functional level  Description: INTERVENTIONS:  - Perform AM-PAC 6 Click Basic Mobility/ Daily Activity assessment daily.  - Set and communicate daily mobility goal to care team and patient/family/caregiver.   - Collaborate with rehabilitation services on mobility goals if consulted  - Perform Range of Motion 3 times a day.  - Reposition patient every 2 hours.  - Dangle patient 3 times a day  - Stand patient 3 times a day  - Ambulate patient 3 times a day  - Out of bed to chair 3 times a day   - Out of bed for meals 3 times a day  - Out of bed for toileting  - Record patient progress and toleration of activity level   Outcome: Progressing     Problem: DISCHARGE PLANNING  Goal: Discharge to home or other facility with appropriate resources  Description: INTERVENTIONS:  - Identify barriers to discharge w/patient and caregiver  -  Arrange for needed discharge resources and transportation as appropriate  - Identify discharge learning needs (meds, wound care, etc.)  - Arrange for interpretive services to assist at discharge as needed  - Refer to Case Management Department for coordinating discharge planning if the patient needs post-hospital services based on physician/advanced practitioner order or complex needs related to functional status, cognitive ability, or social support system  Outcome: Progressing     Problem: Knowledge Deficit  Goal: Patient/family/caregiver demonstrates understanding of disease process, treatment plan, medications, and discharge instructions  Description: Complete learning assessment and assess knowledge base.  Interventions:  - Provide teaching at level of understanding  - Provide teaching via preferred learning methods  Outcome: Progressing     Problem: METABOLIC, FLUID AND ELECTROLYTES - ADULT  Goal: Electrolytes maintained within normal limits  Description: INTERVENTIONS:  - Monitor labs and assess patient for signs and symptoms of electrolyte imbalances  - Administer electrolyte replacement as ordered  - Monitor response to electrolyte replacements, including repeat lab results as appropriate  - Instruct patient on fluid and nutrition as appropriate  Outcome: Progressing  Goal: Fluid balance maintained  Description: INTERVENTIONS:  - Monitor labs   - Monitor I/O and WT  - Instruct patient on fluid and nutrition as appropriate  - Assess for signs & symptoms of volume excess or deficit  Outcome: Progressing  Goal: Glucose maintained within target range  Description: INTERVENTIONS:  - Monitor Blood Glucose as ordered  - Assess for signs and symptoms of hyperglycemia and hypoglycemia  - Administer ordered medications to maintain glucose within target range  - Assess nutritional intake and initiate nutrition service referral as needed  Outcome: Progressing     Problem: SKIN/TISSUE INTEGRITY - ADULT  Goal:  Incision(s), wounds(s) or drain site(s) healing without S/S of infection  Description: INTERVENTIONS  - Assess and document dressing, incision, wound bed, drain sites and surrounding tissue  - Provide patient and family education  - Perform skin care/dressing changes per MD order  Outcome: Progressing  Goal: Pressure injury heals and does not worsen  Description: Interventions:  - Implement low air loss mattress or specialty surface (Criteria met)  - Apply silicone foam dressing  - Instruct/assist with weight shifting every 120  minutes when in chair   - Limit chair time to 2 hour intervals  -- Perform passive or active ROM every shift.   - Turn and reposition patient & offload bony prominences every 2 hours   - Utilize friction reducing device or surface for transfers   - Consider consults to  interdisciplinary teams such as wound care team, surgical team.   - Consider nutrition services referral as needed  Outcome: Progressing

## 2025-01-04 NOTE — PROGRESS NOTES
Progress Note - Hospitalist   Name: Thomas Horne 79 y.o. 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  Patient reports wound started about 6 weeks ago likely due to cat scratch initially. Goes to wound care center for hyperbaric therapy daily Monday to Friday.  Patient reports wound care doctor concerned about infection in the bone.  Patient also reports he has very poor circulation to right foot per vascular doctor.  Chart reviewed.  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  Left lower limb LEAD showed FABIOLA 0.79 which is in the moderate disease category, great toe pressure of 51 which is borderline within the healing range.  Wound care provider documented clinically evident osteomyelitis present at the base of the wound, small amount of bone debrided from the wound today.    Patient was seen by vascular surgery in ED, recommends diagnostic angiogram, recommends to transfer patient to [place] for arteriogram and consideration for bypass redo.   Patient currently pending bed availability at  [place]  X-ray right foot no underlying osseous changes to suggest osteomyelitis, recommend MRI if clinically warranted.  WBC normal, no bands.  Patient afebrile.  ESR 52, CRP 10.7.  Denies history of MRSA.  Patient was started on Ancef 2 g IV every 8 hours as per admitting provider discussion with ID  Wound culture pending  Continue local wound care  Continue Plavix Lipitor Trental  Pending podiatry and ID consults  Defer MRI to podiatry.  Patient has a newly placed pacemaker.     PAD (peripheral artery disease) ([facility])  As above  Continue Plavix Lipitor Trental  Vascular surgery evaluated the patient in the ED  History of left lower extremity bypass on 8/30/2024 and right lower extremity bypass on 11/16/2023. 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  Left lower limb LEAD showed FABIOLA 0.79 which is in the moderate disease category, great toe pressure of 51 which is borderline within the healing range.  Vascular surgery planning arteriogram and consideration for redo bypass  Mixed hyperlipidemia  Continue statin fish oil fenofibrate  Primary hypertension  On Norvasc, losartan, metoprolol at home.  Continue Norvasc metoprolol  Hold losartan in view of elevated creatinine  BP labile  Coronary artery disease involving native coronary artery of native heart without angina pectoris  Status post CABG x 3 in 2002 and PCI with drug-eluting stent to distal left circumflex in 2021.  Continue Plavix Lipitor metoprolol Ranexa Imdur Nitrostat as needed  Denies chest pain  Type 2 diabetes mellitus with diabetic polyneuropathy, with long-term current use of insulin ([facility])  Lab Results   Component Value Date    HGBA1C  6.2 (H) 10/14/2024       Recent Labs     01/03/25  2312 01/04/25  0710 01/04/25  1053 01/04/25  1617   POCGLU 152* 109 211* 200*       Blood Sugar Average: Last 72 hrs:  (P) 168On Lantus 15 units at bedtime, metformin Jardiance Januvia at home.  Continue Lantus 15 units at bedtime  Continue Jardiance and Januvia.  Hold if NPO.  Continue diabetic diet and Humalog sliding scale    History of endovascular stent graft for abdominal aortic aneurysm (AAA)  Noted history  Chronic diastolic congestive heart failure (HCC)  On Demadex 20 mg p.o. daily, losartan, metoprolol at home.  2D echo in December 2024 showed EF 50%, abnormal diastolic function, moderately dilated LA, moderate to severe aortic stenosis, mildly to moderately elevated RV systolic pressure.  Losartan and Demadex held in the setting of worsening creatinine  Continue metoprolol  Patient appears dry to euvolemic on exam.  Daily weight, intake output    Acute kidney injury superimposed on stage 3b chronic kidney disease (HCC)  Baseline creatinine appears to be around 1.0-1.6 since end of August 2024  Creatinine upon admission was 1.7 which has increased to 2.45  Losartan was held initially and patient received normal saline at 50 mL/h for 10 hours  Hold torsemide  Continue gentle IV hydration for 24 hours  Monitor PVR  Avoid nephrotoxins and hypotension  Repeat BMP in the morning  Results from last 7 days   Lab Units 01/04/25  0651 01/03/25  1459   BUN mg/dL 42* 34*   CREATININE mg/dL 2.45* 1.73*        History of DVT (deep vein thrombosis)  On Plavix at home  Lovenox for DVT prophylaxis  Lower venous Doppler done last admission negative for DVT  Ulcer of right foot (HCC)  Right heel wound is healing well  Monitor  S/P placement of cardiac pacemaker  Patient is hospitalized 12/12-12/17 for bradycardia, status post pacemaker placement.    Multiple gastric ulcers  Noted history  Continue PPI daily  Bacteremia  Blood cultures 1 out of 2 from admission growing  gram-positive cocci in chains and clusters  Currently on IV Ancef  Added on vancomycin pending final cultures    VTE Pharmacologic Prophylaxis: VTE Score: 6 High Risk (Score >/= 5) - Pharmacological DVT Prophylaxis Ordered: heparin. Sequential Compression Devices Ordered.    Mobility:   Basic Mobility Inpatient Raw Score: 21  JH-HLM Goal: 6: Walk 10 steps or more  JH-HLM Achieved: 7: Walk 25 feet or more  JH-HLM Goal achieved. Continue to encourage appropriate mobility.    Patient Centered Rounds: I performed bedside rounds with nursing staff today.   Discussions with Specialists or Other Care Team Provider: No    Education and Discussions with Family / Patient: yes    Current Length of Stay: 1 day(s)  Current Patient Status: Inpatient   Certification Statement: The patient will continue to require additional inpatient hospital stay due to right foot diabetic ulcer  Discharge Plan:  Pending course    Code Status: Prior    Subjective   Patient complaining of bilateral leg pain which is intermittent and shooting down his legs.  Denies any chest pain, shortness of breath or abdominal pain.    Objective :  Temp:  [97.3 °F (36.3 °C)-98.2 °F (36.8 °C)] 97.3 °F (36.3 °C)  HR:  [65-79] 79  BP: ()/(50-64) 97/56  Resp:  [18-20] 20  SpO2:  [90 %-99 %] 96 %  O2 Device: None (Room air)    Body mass index is 28.28 kg/m².     Input and Output Summary (last 24 hours):     Intake/Output Summary (Last 24 hours) at 1/4/2025 1644  Last data filed at 1/4/2025 1101  Gross per 24 hour   Intake 2190.83 ml   Output 1000 ml   Net 1190.83 ml       Physical Exam  Constitutional:       Appearance: Normal appearance.   HENT:      Head: Normocephalic and atraumatic.   Eyes:      Extraocular Movements: Extraocular movements intact.      Pupils: Pupils are equal, round, and reactive to light.   Cardiovascular:      Rate and Rhythm: Normal rate and regular rhythm.      Heart sounds: No murmur heard.     No gallop.   Pulmonary:      Effort:  Pulmonary effort is normal.      Breath sounds: Normal breath sounds.   Abdominal:      General: Bowel sounds are normal.      Palpations: Abdomen is soft.      Tenderness: There is no abdominal tenderness.   Musculoskeletal:         General: No swelling or deformity. Normal range of motion.      Cervical back: Normal range of motion and neck supple.   Skin:     General: Skin is warm and dry.      Comments: Right big toe dressing is clean dry and intact   Neurological:      General: No focal deficit present.      Mental Status: He is alert.           Lines/Drains:              Lab Results: I have reviewed the following results:   Results from last 7 days   Lab Units 01/04/25  0651 01/03/25  1459   WBC Thousand/uL 13.98* 10.06   HEMOGLOBIN g/dL 11.9* 14.3   HEMATOCRIT % 36.4* 45.3   PLATELETS Thousands/uL 224 307   SEGS PCT %  --  78*   LYMPHO PCT %  --  14   MONO PCT %  --  6   EOS PCT %  --  1     Results from last 7 days   Lab Units 01/04/25  0651   SODIUM mmol/L 133*   POTASSIUM mmol/L 3.8   CHLORIDE mmol/L 100   CO2 mmol/L 28   BUN mg/dL 42*   CREATININE mg/dL 2.45*   ANION GAP mmol/L 5   CALCIUM mg/dL 8.5   GLUCOSE RANDOM mg/dL 111         Results from last 7 days   Lab Units 01/04/25  1617 01/04/25  1053 01/04/25  0710 01/03/25  2312   POC GLUCOSE mg/dl 200* 211* 109 152*               Recent Cultures (last 7 days):   Results from last 7 days   Lab Units 01/03/25  2035 01/03/25  1513   BLOOD CULTURE  Received in Microbiology Lab. Culture in Progress.  --    GRAM STAIN RESULT  Gram positive cocci in chains and clusters* No Polys or Bacteria seen   WOUND CULTURE   --  Culture too young- will reincubate       Imaging Results Review: I reviewed radiology reports from this admission including: xray(s).      Last 24 Hours Medication List:     Current Facility-Administered Medications:     amLODIPine (NORVASC) tablet 2.5 mg, Daily    atorvastatin (LIPITOR) tablet 40 mg, HS    ceFAZolin (ANCEF) IVPB (premix in  dextrose) 2,000 mg 50 mL, Q8H, Last Rate: 2,000 mg (01/04/25 1526)    clopidogrel (PLAVIX) tablet 75 mg, Daily    DULoxetine (CYMBALTA) delayed release capsule 30 mg, Daily    Empagliflozin (JARDIANCE) tablet 20 mg, Daily    enoxaparin (LOVENOX) subcutaneous injection 30 mg, Q24H CONSTANTINE    fenofibrate (TRICOR) tablet 145 mg, Daily    fish oil capsule 1,000 mg, BID    HYDROmorphone (DILAUDID) injection 0.5 mg, Q4H PRN    insulin glargine (LANTUS) subcutaneous injection 15 Units 0.15 mL, HS    insulin lispro (HumALOG/ADMELOG) 100 units/mL subcutaneous injection 1-5 Units, TID AC **AND** Fingerstick Glucose (POCT), TID AC    insulin lispro (HumALOG/ADMELOG) 100 units/mL subcutaneous injection 1-5 Units, HS    isosorbide mononitrate (IMDUR) 24 hr tablet 90 mg, Daily    metoprolol succinate (TOPROL-XL) 24 hr tablet 100 mg, Daily    multivitamin-minerals (CENTRUM) tablet 1 tablet, Daily    nitroglycerin (NITROSTAT) SL tablet 0.4 mg, Q5 Min PRN    oxyCODONE (ROXICODONE) IR tablet 5 mg, Q12H PRN    pantoprazole (PROTONIX) EC tablet 40 mg, Daily Before Breakfast    pentoxifylline (TRENtal) ER tablet 400 mg, TID With Meals    pregabalin (LYRICA) capsule 150 mg, TID    ranolazine (RANEXA) 12 hr tablet 1,000 mg, BID    sitaGLIPtin (JANUVIA) tablet 25 mg, Daily    sodium chloride 0.9 % infusion, Continuous    [Held by provider] torsemide (DEMADEX) tablet 20 mg, Daily    vancomycin (VANCOCIN) 1500 mg in sodium chloride 0.9% 250 mL IVPB, Q12H    Administrative Statements   Today, Patient Was Seen By: Jamaica Thacker MD      **Please Note: This note may have been constructed using a voice recognition system.**

## 2025-01-04 NOTE — ASSESSMENT & PLAN NOTE
Lab Results   Component Value Date    HGBA1C 6.2 (H) 10/14/2024       Recent Labs     01/03/25  2312 01/04/25  0710 01/04/25  1053 01/04/25  1617   POCGLU 152* 109 211* 200*       Blood Sugar Average: Last 72 hrs:  (P) 168On Lantus 15 units at bedtime, metformin Jardiance Januvia at home.  Continue Lantus 15 units at bedtime  Continue Jardiance and Januvia.  Hold if NPO.  Continue diabetic diet and Humalog sliding scale

## 2025-01-04 NOTE — ASSESSMENT & PLAN NOTE
Lab Results   Component Value Date    HGBA1C 6.2 (H) 10/14/2024       Recent Labs     01/03/25  2312 01/04/25  0710 01/04/25  1053 01/04/25  1617   POCGLU 152* 109 211* 200*       Blood Sugar Average: Last 72 hrs:      Patient presents with worsening right great toe wound from wound care center.  Patient reports wound started about 6 weeks ago likely due to cat scratch initially. Goes to wound care center for hyperbaric therapy daily Monday to Friday.  Patient reports wound care doctor concerned about infection in the bone.  Patient also reports he has very poor circulation to right foot per vascular doctor.  Chart reviewed.  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  Left lower limb LEAD showed FABIOLA 0.79 which is in the moderate disease category, great toe pressure of 51 which is borderline within the healing range.  Wound care provider documented clinically evident osteomyelitis present at the base of the wound, small amount of bone debrided from the wound today.    Patient was seen by vascular surgery in ED, recommends diagnostic angiogram, recommends to transfer patient to [place] for arteriogram and consideration for bypass redo.   Patient currently pending bed availability at [place]  X-ray right foot no underlying osseous changes to suggest osteomyelitis, recommend MRI if clinically warranted.  WBC normal, no bands.  Patient afebrile.  ESR 52, CRP 10.7.  Denies history of MRSA.  Patient was started on Ancef 2 g IV every 8 hours as per admitting provider discussion with ID  Wound culture  pending  Continue local wound care  Continue Plavix Lipitor Trental  Pending podiatry and ID consults  Defer MRI to podiatry.  Patient has a newly placed pacemaker.

## 2025-01-04 NOTE — ASSESSMENT & PLAN NOTE
Blood cultures 1 out of 2 from admission growing gram-positive cocci in chains and clusters  Currently on IV Ancef  Added on vancomycin pending final cultures

## 2025-01-04 NOTE — ASSESSMENT & PLAN NOTE
As above  Continue Plavix Lipitor [facility]  Vascular surgery evaluated the patient in the ED  History of left lower extremity bypass on 8/30/2024 and right lower extremity bypass on 11/16/2023. 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  Left lower limb LEAD showed FABIOLA 0.79 which is in the moderate disease category, great toe pressure of 51 which is borderline within the healing range.  Vascular surgery planning arteriogram and consideration for redo bypass

## 2025-01-04 NOTE — ASSESSMENT & PLAN NOTE
On Demadex 20 mg p.o. daily, losartan, metoprolol at home.  2D echo in December 2024 showed EF 50%, abnormal diastolic function, moderately dilated LA, moderate to severe aortic stenosis, mildly to moderately elevated RV systolic pressure.  Continue Demadex  Hold losartan in view of elevated creatinine  Continue metoprolol  Patient appears dry to euvolemic on exam.  Daily weight, intake output

## 2025-01-04 NOTE — ASSESSMENT & PLAN NOTE
Baseline creatinine appears to be around 1.0-1.6 since end of August 2024  Creatinine upon admission was 1.7 which has increased to 2.45  Losartan was held initially and patient received normal saline at 50 mL/h for 10 hours  Hold torsemide  Continue gentle IV hydration for 24 hours  Monitor PVR  Avoid nephrotoxins and hypotension  Repeat BMP in the morning  Results from last 7 days   Lab Units 01/04/25  0651 01/03/25  1459   BUN mg/dL 42* 34*   CREATININE mg/dL 2.45* 1.73*

## 2025-01-04 NOTE — ASSESSMENT & PLAN NOTE
As above  Continue Plavix Lipitor [facility]  Vascular surgery evaluated the patient in the ED  History of left lower extremity bypass on 8/30/2024 and right lower extremity bypass on 11/16/2023. 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  Left lower limb LEAD showed FABIOLA 0.79 which is in the moderate disease category, great toe pressure of 51 which is borderline within the healing range.  Vascular surgery planning arteriogram and consideration for redo bypass

## 2025-01-04 NOTE — DISCHARGE SUMMARY
Discharge Summary - Hospitalist   Name: Thomas Horne 79 y.o. male I MRN: [id number]  Unit/Bed#: 3 [facility] 309-01 I Date of Admission: 1/3/2025   Date of Service: 1/4/2025 I Hospital Day: 1     Assessment & Plan  Diabetic ulcer of toe of right foot associated with type 2 diabetes mellitus, with fat layer exposed (HCC)  Lab Results   Component Value Date    HGBA1C 6.2 (H) 10/14/2024       Recent Labs     01/03/25  2312 01/04/25  0710 01/04/25  1053 01/04/25  1617   POCGLU 152* 109 211* 200*       Blood Sugar Average: Last 72 hrs:      Patient presents with worsening right great toe wound from wound care center.  Patient reports wound started about 6 weeks ago likely due to cat scratch initially. Goes to wound care center for hyperbaric therapy daily Monday to Friday.  Patient reports wound care doctor concerned about infection in the bone.  Patient also reports he has very poor circulation to right foot per vascular doctor.  Chart reviewed.  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  Left lower limb LEAD showed FABIOLA 0.79 which is in the moderate disease category, great toe pressure of 51 which is borderline within the healing range.  Wound care provider documented clinically evident osteomyelitis present at the base of the wound, small amount of bone debrided from the wound today.    Patient was seen by vascular surgery in ED, recommends diagnostic angiogram, recommends to transfer patient to [facility] for arteriogram and consideration for bypass redo.   Patient currently pending bed availability at  [place]  X-ray right foot no underlying osseous changes to suggest osteomyelitis, recommend MRI if clinically warranted.  WBC normal, no bands.  Patient afebrile.  ESR 52, CRP 10.7.  Denies history of MRSA.  Patient was started on Ancef 2 g IV every 8 hours as per admitting provider discussion with ID  Wound culture pending  Continue local wound care  Continue Plavix Lipitor Trental  Pending podiatry and ID consults  Defer MRI to podiatry.  Patient has a newly placed pacemaker.     PAD (peripheral artery disease) ([facility])  As above  Continue Plavix Lipitor Trental  Vascular surgery evaluated the patient in the ED  History of left lower extremity bypass on 8/30/2024 and right lower extremity bypass on 11/16/2023. 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  Left lower limb LEAD showed FABIOLA 0.79 which is in the moderate disease category, great toe pressure of 51 which is borderline within the healing range.  Vascular surgery planning arteriogram and consideration for redo bypass  Mixed hyperlipidemia  Continue statin fish oil fenofibrate  Primary hypertension  On Norvasc, losartan, metoprolol at home.  Continue Norvasc metoprolol  Hold losartan in view of elevated creatinine  BP labile  Coronary artery disease involving native coronary artery of native heart without angina pectoris  Status post CABG x 3 in 2002 and PCI with drug-eluting stent to distal left circumflex in 2021.  Continue Plavix Lipitor metoprolol Ranexa Imdur Nitrostat as needed  Denies chest pain  Type 2 diabetes mellitus with diabetic polyneuropathy, with long-term current use of insulin ([facility])  Lab Results   Component Value Date    HGBA1C  6.2 (H) 10/14/2024       Recent Labs     01/03/25  2312 01/04/25  0710 01/04/25  1053 01/04/25  1617   POCGLU 152* 109 211* 200*       Blood Sugar Average: Last 72 hrs:  (P) 168On Lantus 15 units at bedtime, metformin Jardiance Januvia at home.  Continue Lantus 15 units at bedtime  Continue Jardiance and Januvia.  Hold if NPO.  Continue diabetic diet and Humalog sliding scale    History of endovascular stent graft for abdominal aortic aneurysm (AAA)  Noted history  Chronic diastolic congestive heart failure (HCC)  On Demadex 20 mg p.o. daily, losartan, metoprolol at home.  2D echo in December 2024 showed EF 50%, abnormal diastolic function, moderately dilated LA, moderate to severe aortic stenosis, mildly to moderately elevated RV systolic pressure.  Losartan and Demadex held in the setting of worsening creatinine  Continue metoprolol  Patient appears dry to euvolemic on exam.  Daily weight, intake output    Acute kidney injury superimposed on stage 3b chronic kidney disease (HCC)  Baseline creatinine appears to be around 1.0-1.6 since end of August 2024  Creatinine upon admission was 1.7 which has increased to 2.45  Losartan was held initially and patient received normal saline at 50 mL/h for 10 hours  Hold torsemide  Continue gentle IV hydration for 24 hours  Monitor PVR  Avoid nephrotoxins and hypotension  Repeat BMP in the morning  Results from last 7 days   Lab Units 01/04/25  0651 01/03/25  1459   BUN mg/dL 42* 34*   CREATININE mg/dL 2.45* 1.73*        History of DVT (deep vein thrombosis)  On Plavix at home  Lovenox for DVT prophylaxis  Lower venous Doppler done last admission negative for DVT  Ulcer of right foot (HCC)  Right heel wound is healing well  Monitor  S/P placement of cardiac pacemaker  Patient is hospitalized 12/12-12/17 for bradycardia, status post pacemaker placement.    Multiple gastric ulcers  Noted history  Continue PPI daily  Bacteremia  Blood cultures 1 out of 2 from admission growing  gram-positive cocci in chains and clusters  Currently on IV Ancef  Added on vancomycin pending final cultures     Medical Problems       Resolved Problems  Date Reviewed: 12/23/2024   None       Discharging Physician / Practitioner: Jamaica Thacker MD  PCP: Frank Lombardi, DO  Admission Date:   Admission Orders (From admission, onward)       Ordered        01/03/25 1920  INPATIENT ADMISSION  Once                          Discharge Date: 01/04/25    Significant Findings / Test Results:   Right foot x-ray without any acute osseous abnormality with the distal great toe ulcer without underlying osseous changes    Complications: None    Reason for Admission: Right big toe wound    Hospital Course:   Thomas Horne is a 79 y.o. male patient with past med history of PAD, CHF, CKD 3, CAD, right great toe diabetic ulcer, hypertension, hyperemia, prostate cancer, pacemaker, AAA status post repair who originally presented to the hospital on 1/3/2025 due to nonhealing right big toe wound which started about 6 weeks ago.  Patient was admitted to the hospital for same and was started on IV Ancef.  Patient has known history of significant PAD and patient was admitted with vascular surgery in the ED and recommended transfer to [place] for angiogram and possible bypass redo.  During hospital stay, patient not have worsening kidney function and blood cultures 1 out of 2 are also positive.  Patient's diuretics were held and patient continued on gentle IV hydration.  Patient as needed vancomycin.  Patient to be transferred to [facility] for vascular surgery evaluation      Please see above list of diagnoses and related plan for additional information.     Condition at Discharge: fair    Discharge Day Visit / Exam:   * Please refer to separate progress note for these details *      Discharge instructions/Information to patient and family:   See after visit summary for information provided to patient and family.       Provisions for Follow-Up Care:  See after visit summary for information related to follow-up care and any pertinent home health orders.      Mobility at time of Discharge:   Basic Mobility Inpatient Raw Score: 21  JH-HLM Goal: 6: Walk 10 steps or more  JH-HLM Achieved: 7: Walk 25 feet or more  HLM Goal achieved. Continue to encourage appropriate mobility.     Disposition:   Acute [facility] Transfer to [facility]    Planned Readmission: No    Discharge Medications:  See after visit summary for reconciled discharge medications provided to patient and/or family.      Administrative Statements   Discharge Statement:  I have spent a total time of 35 minutes in caring for this patient on the day of the visit/encounter. .    **Please Note: This note may have been constructed using a voice recognition system**

## 2025-01-04 NOTE — EMTALA/ACUTE CARE TRANSFER
[facility] EMERGENCY DEPARTMENT  [address] [address]  Dept: [phone]      EMTALA TRANSFER CONSENT    NAME Thomas Horne                                         1945                              MRN [id number]    I have been informed of my rights regarding examination, treatment, and transfer   by Dr. Prashanth Rosales DO    Benefits: Specialized equipment and/or services available at the receiving facility (Include comment)________________________    Risks: Potential for delay in receiving treatment, Potential deterioration of medical condition, Loss of IV, Increased discomfort during transfer, Possible worsening of condition or death during transfer      Consent for Transfer:  I acknowledge that my medical condition has been evaluated and explained to me by the emergency department physician or other qualified medical person and/or my attending physician, who has recommended that I be transferred to the service of  Accepting Physician: Dr. Heredia at Accepting Facility Name, City & State : [facility]. The above potential benefits of such transfer, the potential risks associated with such transfer, and the probable risks of not being transferred have been explained to me, and I fully understand them.  The doctor has explained that, in my case, the benefits of transfer outweigh the risks.  I agree to be transferred.    I authorize the performance of emergency medical procedures and treatments upon me in both transit and upon arrival at the receiving facility.  Additionally, I authorize the release of any and all medical records to the receiving facility and request they be transported with me, if possible.  I understand that the safest mode of transportation during a medical emergency is an ambulance and that the Hospital advocates the use of this mode of transport. Risks of traveling to the receiving facility by car, including absence of medical control, life sustaining  equipment, such as oxygen, and medical personnel has been explained to me and I fully understand them.    (JENNI CORRECT BOX BELOW)  [  ]  I consent to the stated transfer and to be transported by ambulance/helicopter.  [  ]  I consent to the stated transfer, but refuse transportation by ambulance and accept full responsibility for my transportation by car.  I understand the risks of non-ambulance transfers and I exonerate the Hospital and its staff from any deterioration in my condition that results from this refusal.    X___________________________________________    DATE  25  TIME________  Signature of patient or legally responsible individual signing on patient behalf           RELATIONSHIP TO PATIENT_________________________          Provider Certification    NAME Thomas Horne                                         1945                              MRN [id number]    A medical screening exam was performed on the above named patient.  Based on the examination:    Condition Necessitating Transfer The encounter diagnosis was Osteomyelitis (HCC).    Patient Condition: The patient has been stabilized such that within reasonable medical probability, no material deterioration of the patient condition or the condition of the unborn child(rayshawn) is likely to result from the transfer    Reason for Transfer: Level of Care needed not available at this facility    Transfer Requirements: Facility B   Space available and qualified personnel available for treatment as acknowledged by    Agreed to accept transfer and to provide appropriate medical treatment as acknowledged by       Dr. Heredia  Appropriate medical records of the examination and treatment of the patient are provided at the time of transfer   STAFF INITIAL WHEN COMPLETED _______  Transfer will be performed by qualified personnel from    and appropriate transfer equipment as required, including the use of necessary and appropriate life support  measures.    Provider Certification: I have examined the patient and explained the following risks and benefits of being transferred/refusing transfer to the patient/family:  General risk, such as traffic hazards, adverse weather conditions, rough terrain or turbulence, possible failure of equipment (including vehicle or aircraft), or consequences of actions of persons outside the control of the transport personnel      Based on these reasonable risks and benefits to the patient and/or the unborn child(rayshawn), and based upon the information available at the time of the patient’s examination, I certify that the medical benefits reasonably to be expected from the provision of appropriate medical treatments at another medical facility outweigh the increasing risks, if any, to the individual’s medical condition, and in the case of labor to the unborn child, from effecting the transfer.    X____________________________________________ DATE 01/03/25        TIME_______      ORIGINAL - SEND TO MEDICAL RECORDS   COPY - SEND WITH PATIENT DURING TRANSFER

## 2025-01-04 NOTE — ASSESSMENT & PLAN NOTE
On Demadex 20 mg p.o. daily, losartan, metoprolol at home.  2D echo in December 2024 showed EF 50%, abnormal diastolic function, moderately dilated LA, moderate to severe aortic stenosis, mildly to moderately elevated RV systolic pressure.  Losartan and Demadex held in the setting of worsening creatinine  Continue metoprolol  Patient appears dry to euvolemic on exam.  Daily weight, intake output

## 2025-01-04 NOTE — ASSESSMENT & PLAN NOTE
"Lab Results   Component Value Date    HGBA1C 6.2 (H) 10/14/2024       No results for input(s): \"POCGLU\" in the last 72 hours.    Blood Sugar Average: Last 72 hrs:  On Lantus 15 units at bedtime, metformin Jardiance Januvia at home.  Continue Lantus 15 units at bedtime  Continue Jardiance Januvia.  Hold if NPO.  SSI  Diabetic diet  Recent A1c at goal    "

## 2025-01-04 NOTE — ASSESSMENT & PLAN NOTE
Status post CABG x 3 in 2002 and PCI with drug-eluting stent to distal left circumflex in 2021.  Continue Plavix Lipitor metoprolol Ranexa Imdur Nitrostat as needed  Denies chest pain

## 2025-01-04 NOTE — ASSESSMENT & PLAN NOTE
On Plavix at home  Lovenox for DVT prophylaxis  Lower venous Doppler done last admission negative for DVT

## 2025-01-04 NOTE — ASSESSMENT & PLAN NOTE
On Norvasc, losartan, metoprolol at home.  Continue Norvasc metoprolol  Hold losartan in view of elevated creatinine  BP labile

## 2025-01-04 NOTE — ASSESSMENT & PLAN NOTE
"Lab Results   Component Value Date    HGBA1C 6.2 (H) 10/14/2024       No results for input(s): \"POCGLU\" in the last 72 hours.    Blood Sugar Average: Last 72 hrs:      Patient presents with worsening right great toe wound from wound care center.  Patient reports wound started about 6 weeks ago likely due to cat scratch initially. Goes to wound care center for hyperbaric therapy daily Monday to Friday.  Patient reports wound care doctor concerned about infection in the bone.  Patient also reports he has very poor circulation to right foot per vascular doctor.  Chart reviewed.  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  Left lower limb LEAD showed FABIOLA 0.79 which is in the moderate disease category, great toe pressure of 51 which is borderline within the healing range.  Wound care provider documented clinically evident osteomyelitis present at the base of the wound, small amount of bone debrided from the wound today.    Patient was seen by vascular surgery in ED, recommends diagnostic angiogram, recommends to transfer patient to [place] for arteriogram and consideration for bypass redo.   Bed not available in [facility].  Follow-up transfer in the morning.  X-ray right foot no underlying osseous changes to suggest osteomyelitis, recommend MRI if clinically warranted.  WBC normal, no bands.  Patient afebrile.  ESR 52, CRP 10.7.  Patient had chills on exam.  Denies history of MRSA.  Continue with ID on-call, recommend blood cultures x 2, start Ancef for now.  Wound culture pending  Continue local " wound care  Continue Plavix Lipitor Trental  Consult podiatry and ID  Defer MRI to podiatry.  Patient has a newly placed pacemaker.

## 2025-01-04 NOTE — H&P
"H&P - Hospitalist   Name: Thomas Horne 79 y.o. male I MRN: [id number]  Unit/Bed#: [address] [place] [address] I Date of Admission: 1/3/2025   Date of Service: 1/3/2025 I Hospital Day: 0     Assessment & Plan  Diabetic ulcer of toe of right foot associated with type 2 diabetes mellitus, with fat layer exposed (HCC)  Lab Results   Component Value Date    HGBA1C 6.2 (H) 10/14/2024       No results for input(s): \"POCGLU\" in the last 72 hours.    Blood Sugar Average: Last 72 hrs:      Patient presents with worsening right great toe wound from wound care center.  Patient reports wound started about 6 weeks ago likely due to cat scratch initially. Goes to wound care center for hyperbaric therapy daily Monday to Friday.  Patient reports wound care doctor concerned about infection in the bone.  Patient also reports he has very poor circulation to right foot per vascular doctor.  Chart reviewed.  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  Left lower limb LEAD showed FABIOLA 0.79 which is in the moderate disease category, great toe pressure of 51 which is borderline within the healing range.  Wound care provider documented clinically evident osteomyelitis present at the base of the wound, small amount of bone debrided from the wound today.    Patient was seen by vascular surgery in ED, recommends diagnostic angiogram, recommends to transfer patient to [place] for arteriogram and consideration for bypass redo.   Bed not available in [facility].  Follow-up transfer in the morning.  X-ray right foot no underlying osseous " "changes to suggest osteomyelitis, recommend MRI if clinically warranted.  WBC normal, no bands.  Patient afebrile.  ESR 52, CRP 10.7.  Patient had chills on exam.  Denies history of MRSA.  Continue with ID on-call, recommend blood cultures x 2, start Ancef for now.  Wound culture pending  Continue local wound care  Continue Plavix Lipitor Trental  Consult podiatry and ID  Defer MRI to podiatry.  Patient has a newly placed pacemaker.     PAD (peripheral artery disease) ([facility])  As above  Continue Plavix Lipitor Trental  Consult vascular surgery  Mixed hyperlipidemia  Continue statin fish oil fenofibrate  Primary hypertension  On Norvasc, losartan, metoprolol at home.  Continue Norvasc metoprolol  Hold losartan in view of elevated creatinine  BP labile  Coronary artery disease involving native coronary artery of native heart without angina pectoris  Status post CABG x 3 in 2002 and PCI with drug-eluting stent to distal left circumflex in 2021.  Continue Plavix Lipitor metoprolol Ranexa Imdur Nitrostat as needed  Denies chest pain  Type 2 diabetes mellitus with diabetic polyneuropathy, with long-term current use of insulin ([facility])  Lab Results   Component Value Date    HGBA1C 6.2 (H) 10/14/2024       No results for input(s): \"POCGLU\" in the last 72 hours.    Blood Sugar Average: Last 72 hrs:  On Lantus 15 units at bedtime, metformin Jardiance Januvia at home.  Continue Lantus 15 units at bedtime  Continue Jardiance Januvia.  Hold if NPO.  SSI  Diabetic diet  Recent A1c at goal    History of endovascular stent graft for abdominal aortic aneurysm (AAA)  Noted history  Chronic diastolic congestive heart failure (HCC)  On Demadex 20 mg p.o. daily, losartan, metoprolol at home.  2D echo in December 2024 showed EF 50%, abnormal diastolic function, moderately dilated LA, moderate to severe aortic stenosis, mildly to moderately elevated RV systolic pressure.  Continue Demadex  Hold losartan in view of elevated creatinine  Continue " metoprolol  Patient appears dry to euvolemic on exam.  Daily weight, intake output    CKD stage 3b, GFR 30-44 ml/min ([facility])  Baseline creatinine appears to be around 1.0-1.6 since end of August 2024  Creatinine today 1.73, close to baseline  Hold losartan  Gentle IV hydration for 10 hours  Avoid nephrotoxins and hypotension  Repeat BMP in the morning    History of DVT (deep vein thrombosis)  On Plavix at home  Lovenox for DVT prophylaxis  Lower venous Doppler done last admission negative for DVT  Ulcer of right foot ([facility])  Right heel wound is healing well  Monitor  S/P placement of cardiac pacemaker  Patient is hospitalized 12/12-12/17 for bradycardia, status post pacemaker placement.    Multiple gastric ulcers  Noted history  Continue PPI daily      VTE Pharmacologic Prophylaxis: VTE Score: 6 High Risk (Score >/= 5) - Pharmacological DVT Prophylaxis Ordered: enoxaparin (Lovenox). Sequential Compression Devices Ordered.  Code Status:Full code  Discussion with family: Updated  (daughter) at bedside.    Anticipated Length of Stay: Patient will be admitted on an inpatient basis with an anticipated length of stay of greater than 2 midnights secondary to diabetic ulcer, PAD.    History of Present Illness   Chief Complaint: Worsening diabetic foot ulcer with poor circulation    Thomas Horne is a 79 y.o. male with a PMH of PAD, CHF, CKD 3, CAD, right great toe diabetic ulcer, hypertension, hyperlipidemia, prostate cancer, pacemaker insertion, AAA repair who presents with worsening right great toe wound from wound care center.  Patient reports wound started about 6 weeks ago likely due to cat scratch initially. Goes to wound care center for hyperbaric therapy daily Monday to Friday.  Patient reports wound care doctor concerned about infection in the bone.  Patient also reports he has very poor circulation to right foot per vascular doctor.  Reports history of bypass to both legs.  Patient denies chest pain,  headache, dizziness, SOB, nausea vomiting diarrhea constipation fever.  Started with chills in ED during exam.  Patient denies pain in foot, has history of neuropathy.  Reports wear surgical shoe to left foot at home.  Patient denies history of MRSA.  Reports blood sugar was under control at home.  No other complaints.      Review of Systems   Constitutional:  Positive for chills.   Skin:  Positive for wound.   All other systems reviewed and are negative.      Historical Information   Past Medical History:   Diagnosis Date    Anemia     CAD (coronary artery disease)     Callus     Cancer (HCC)     prostate    CHF (congestive heart failure) (HCC)     Chronic kidney disease     Clotting disorder (HCC)     Coronary artery disease 1988    Deep vein thrombosis (HCC)     Diabetes mellitus (HCC)     Difficulty walking     Duodenal ulcer     Ear problems     Heart disease 1988    HL (hearing loss)     Hyperlipidemia     Hypertension     Myocardial infarction (HCC)     Neuropathy     Bilateral feet    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)     Plantar fasciitis     Sleep apnea     Could not tolerate CPAP     Past Surgical History:   Procedure Laterality Date    ABDOMINAL AORTIC ANEURYSM REPAIR      Stented    ADENOIDECTOMY      BACK SURGERY  1985    CARDIAC CATHETERIZATION Left 10/19/2022    Procedure: Cardiac Left Heart Cath;  Surgeon: Danielle Pereira MD;  Location: [facility];  Service: Cardiology    CARDIAC ELECTROPHYSIOLOGY PROCEDURE Left 12/16/2024    Procedure: Cardiac pacer implant;  Surgeon: Danielle Pereira MD;  Location: [facility];  Service: Cardiology    CARDIAC SURGERY  2002    3 cardiac bypass then angioplasty 7/2020    CHOLECYSTECTOMY      COLONOSCOPY  02/14/2024    CORONARY ARTERY BYPASS GRAFT      IR LOWER EXTREMITY ANGIOGRAM  11/01/2023    IR LOWER EXTREMITY ANGIOGRAM  08/07/2024    LAMINECTOMY  1990    AK BYPASS W/VEIN FEMORAL-POPLITEAL Right 11/16/2023    Procedure: BYPASS FEMORAL-POPLITEAL WITH  CRYO VEIN, RIGHT FEMORAL ENDARTERECTOMY;  Surgeon: Vasquez Clark MD;  Location: [facility];  Service: Vascular    MS BYPASS W/VEIN FEMORAL-POPLITEAL Left 2024    Procedure: left lower extremity above knee popliteal to below knee popliteal artery bypass with PTFE graft;  Surgeon: Vasquez Clark MD;  Location: [facility];  Service: Vascular    MS SLCTV CATHJ 3RD+ ORD SLCTV ABDL PEL/LXTR BRNCH Right 2023    Procedure: ARTERIOGRAM Right lower extremity arteriogram with CO2 via right groin access;  Surgeon: Vasquez Clark MD;  Location: [facility];  Service: Vascular    MS SLCTV CATHJ 3RD+ ORD SLCTV ABDL PEL/LXTR BRNCH Left 2024    Procedure: diagnostic LLE Arteriogram;  Surgeon: Vasquez Clark MD;  Location: [facility];  Service: Vascular    PROSTATE SURGERY      TONSILLECTOMY      URINARY SPHINCTER IMPLANT       Social History     Tobacco Use    Smoking status: Former     Current packs/day: 0.00     Average packs/day: 1.5 packs/day for 35.0 years (52.5 ttl pk-yrs)     Types: Cigarettes     Start date: 1956     Quit date:      Years since quittin.0     Passive exposure: Past    Smokeless tobacco: Never   Vaping Use    Vaping status: Never Used   Substance and Sexual Activity    Alcohol use: Yes     Alcohol/week: 14.0 standard drinks of alcohol     Types: 14 Cans of beer per week     Comment: stopped last few months    Drug use: Never    Sexual activity: Not Currently     Partners: Female     Birth control/protection: Male Sterilization     E-Cigarette/Vaping    E-Cigarette Use Never User      E-Cigarette/Vaping Substances    Nicotine No     THC No     CBD No     Flavoring No     Other No     Unknown No      Family history non-contributory  Social History:  Marital Status:    Occupation: Retired  Patient Pre-hospital Living Situation: Home  Patient Pre-hospital Level of Mobility: walks  Patient Pre-hospital Diet Restrictions:  Diabetic cardiac    Meds/Allergies   I have reviewed home medications with patient personally.  Prior to Admission medications    Medication Sig Start Date End Date Taking? Authorizing Provider   amLODIPine (NORVASC) 2.5 mg tablet Take 1 tablet (2.5 mg total) by mouth daily 12/17/24  Yes APOLLO Antonio   atorvastatin (LIPITOR) 40 mg tablet TAKE 1 TABLET BY MOUTH DAILY 9/30/24  Yes Naa Cruz PA-C   clopidogrel (PLAVIX) 75 mg tablet Take 1 tablet (75 mg total) by mouth daily 9/4/24  Yes APOLLO Abrams   DULoxetine (CYMBALTA) 30 mg delayed release capsule Take 1 capsule (30 mg total) by mouth daily 12/4/24  Yes Sundar Arango DPM   Empagliflozin (Jardiance) 25 MG TABS TAKE 1 TABLET BY MOUTH DAILY 12/23/24  Yes Frank Lombardi, DO   fenofibrate 160 MG tablet TAKE 1 TABLET BY MOUTH DAILY 12/24/24  Yes Danielle Pereira MD   insulin glargine (LANTUS) 100 units/mL subcutaneous injection Inject 15 Units under the skin daily at bedtime 11/21/23  Yes APOLLO Kwong   isosorbide mononitrate (IMDUR) 30 mg 24 hr tablet TAKE 3 TABLETS BY MOUTH DAILY 9/27/24  Yes Naa Cruz PA-C   losartan (COZAAR) 25 mg tablet Take 1 tablet (25 mg total) by mouth daily 12/17/24  Yes APOLLO Antonio   metFORMIN (GLUCOPHAGE) 500 mg tablet TAKE 1 TABLET BY MOUTH TWICE  DAILY WITH MEALS 12/4/24  Yes Frank Lombardi, DO   metoprolol succinate (TOPROL-XL) 100 mg 24 hr tablet Take 1 tablet (100 mg total) by mouth daily 12/17/24  Yes APOLLO Antonio   Multiple Vitamins-Minerals (MULTIVITAMIN MEN 50+ PO) Take by mouth daily   Yes Historical Provider, MD   Omega-3 Fatty Acids (fish oil) 1,000 mg Take 4,000 mg by mouth 2 (two) times a day   Yes Historical Provider, MD   pantoprazole (PROTONIX) 40 mg tablet TAKE 1 TABLET BY MOUTH DAILY 9/30/24  Yes Tj Paulino PA-C   pentoxifylline (TRENtal) 400 mg ER tablet TAKE 1 TABLET BY MOUTH 3 TIMES  DAILY WITH MEALS 9/16/24  Yes Sundar Arango DPM   pregabalin (LYRICA) 150  mg capsule Take 1 capsule (150 mg total) by mouth 3 (three) times a day 10/10/24 1/8/25 Yes Kirk Acuna MD   ranolazine (RANEXA) 1000 MG SR tablet Take 1 tablet (1,000 mg total) by mouth 2 (two) times a day 5/10/24 5/5/25 Yes Naa Cruz PA-C   sitaGLIPtin (JANUVIA) 25 mg tablet Take 1 tablet (25 mg total) by mouth daily 12/18/24  Yes Arthur Cavazos DO   torsemide (DEMADEX) 20 mg tablet TAKE 1 TABLET BY MOUTH DAILY 11/27/24  Yes Danielle Pereira MD   acetaminophen (TYLENOL) 325 mg tablet Take 2 tablets (650 mg total) by mouth every 6 (six) hours as needed for mild pain 9/4/24   APOLLO Abrams   aspirin 81 mg chewable tablet Chew 81 mg daily  Patient not taking: Reported on 1/3/2025    Historical Provider, MD   Blood Glucose Monitoring Suppl (OneTouch Verio Reflect) w/Device KIT Check blood sugars twice daily. Please substitute with appropriate alternative as covered by patient's insurance. Dx: E11.65 2/22/22   Frank Lombardi, DO   Droplet Pen Needles 32G X 4 MM MISC USE EVERY EVENING 9/27/23   Frank Lombardi, DO   nitroglycerin (NITROSTAT) 0.4 mg SL tablet Place 1 tablet (0.4 mg total) under the tongue every 5 (five) minutes as needed for chest pain 5/10/24 12/20/24  LARISA PhippsTouch Delica Lancets 33G MISC Check blood sugars twice daily. Please substitute with appropriate alternative as covered by patient's insurance. Dx: E11.65 2/22/22   Frank Lombardi, DO   sodium hypochlorite (DAKIN'S HALF-STRENGTH) external solution Apply 1 Application topically every other day At each dressing change  Patient not taking: Reported on 12/20/2024 10/15/24   Nitza Sotelo DO     Allergies   Allergen Reactions    Lisinopril Rash and Lip Swelling       Objective :  Temp:  [96.5 °F (35.8 °C)-97.9 °F (36.6 °C)] 97.9 °F (36.6 °C)  HR:  [65-73] 69  BP: (103-141)/(50-73) 103/54  Resp:  [18-20] 18  SpO2:  [90 %-99 %] 95 %  O2 Device: None (Room air)    Physical Exam  Vitals and nursing note reviewed.    Constitutional:       Appearance: He is well-developed.   HENT:      Head: Normocephalic and atraumatic.   Neck:      Thyroid: No thyromegaly.      Vascular: No JVD.      Trachea: No tracheal deviation.   Cardiovascular:      Rate and Rhythm: Normal rate and regular rhythm.      Heart sounds: Murmur heard.      Comments: Left chest pacemaker in situ. Steri-Strips in situ.  Pulmonary:      Effort: Pulmonary effort is normal. No respiratory distress.      Breath sounds: Normal breath sounds. No wheezing or rales.   Abdominal:      General: Bowel sounds are normal. There is no distension.      Palpations: Abdomen is soft.      Tenderness: There is no abdominal tenderness. There is no guarding.   Musculoskeletal:      Cervical back: Neck supple.      Right lower leg: No edema.      Left lower leg: No edema.      Comments: Right great toe wound, appears clean, debrided in wound care center.  Small amount of blood seen in the center when squeezed.  No surrounding redness on exam.  Right plantar ulcer healing well, dry.  Right foot is warm exam.   Skin:     General: Skin is warm and dry.   Neurological:      Mental Status: He is alert and oriented to person, place, and time.   Psychiatric:         Mood and Affect: Mood normal.         Judgment: Judgment normal.          Lines/Drains:            Lab Results: I have reviewed the following results:  Results from last 7 days   Lab Units 01/03/25  1459   WBC Thousand/uL 10.06   HEMOGLOBIN g/dL 14.3   HEMATOCRIT % 45.3   PLATELETS Thousands/uL 307   SEGS PCT % 78*   LYMPHO PCT % 14   MONO PCT % 6   EOS PCT % 1     Results from last 7 days   Lab Units 01/03/25  1459   SODIUM mmol/L 135   POTASSIUM mmol/L 4.5   CHLORIDE mmol/L 100   CO2 mmol/L 30   BUN mg/dL 34*   CREATININE mg/dL 1.73*   ANION GAP mmol/L 5   CALCIUM mg/dL 9.9   GLUCOSE RANDOM mg/dL 138             Lab Results   Component Value Date    HGBA1C 6.2 (H) 10/14/2024    HGBA1C 7.0 (H) 09/03/2024    HGBA1C 7.0 (H)  04/17/2024           Imaging Results Review: I reviewed radiology reports from this admission including: X-ray right foot.  Other Study Results Review: No additional pertinent studies reviewed.    Administrative Statements       ** Please Note: This note has been constructed using a voice recognition system. **

## 2025-01-05 PROBLEM — B95.7 STAPHYLOCOCCUS EPIDERMIDIS BACTEREMIA: Status: ACTIVE | Noted: 2025-01-05

## 2025-01-05 PROBLEM — G93.41 ACUTE METABOLIC ENCEPHALOPATHY: Status: ACTIVE | Noted: 2025-01-05

## 2025-01-05 PROBLEM — R78.81 BACTEREMIA DUE TO GRAM-POSITIVE BACTERIA: Status: ACTIVE | Noted: 2025-01-05

## 2025-01-05 LAB
ANION GAP SERPL CALCULATED.3IONS-SCNC: 11 MMOL/L (ref 4–13)
BASOPHILS # BLD MANUAL: 0 THOUSAND/UL (ref 0–0.1)
BASOPHILS NFR MAR MANUAL: 0 % (ref 0–1)
BUN SERPL-MCNC: 47 MG/DL (ref 5–25)
CALCIUM SERPL-MCNC: 8.5 MG/DL (ref 8.4–10.2)
CHLORIDE SERPL-SCNC: 98 MMOL/L (ref 96–108)
CO2 SERPL-SCNC: 25 MMOL/L (ref 21–32)
CREAT SERPL-MCNC: 2.75 MG/DL (ref 0.6–1.3)
EOSINOPHIL # BLD MANUAL: 0.25 THOUSAND/UL (ref 0–0.4)
EOSINOPHIL NFR BLD MANUAL: 2 % (ref 0–6)
ERYTHROCYTE [DISTWIDTH] IN BLOOD BY AUTOMATED COUNT: 14.3 % (ref 11.6–15.1)
GFR SERPL CREATININE-BSD FRML MDRD: 20 ML/MIN/1.73SQ M
GLUCOSE SERPL-MCNC: 108 MG/DL (ref 65–140)
GLUCOSE SERPL-MCNC: 120 MG/DL (ref 65–140)
GLUCOSE SERPL-MCNC: 190 MG/DL (ref 65–140)
GLUCOSE SERPL-MCNC: 250 MG/DL (ref 65–140)
GLUCOSE SERPL-MCNC: 304 MG/DL (ref 65–140)
HCT VFR BLD AUTO: 39.5 % (ref 36.5–49.3)
HGB BLD-MCNC: 12.8 G/DL (ref 12–17)
LYMPHOCYTES # BLD AUTO: 0 % (ref 14–44)
LYMPHOCYTES # BLD AUTO: 0 THOUSAND/UL (ref 0.6–4.47)
MCH RBC QN AUTO: 27.2 PG (ref 26.8–34.3)
MCHC RBC AUTO-ENTMCNC: 32.4 G/DL (ref 31.4–37.4)
MCV RBC AUTO: 84 FL (ref 82–98)
MONOCYTES # BLD AUTO: 0 THOUSAND/UL (ref 0–1.22)
MONOCYTES NFR BLD: 0 % (ref 4–12)
NEUTROPHILS # BLD MANUAL: 12.11 THOUSAND/UL (ref 1.85–7.62)
NEUTS BAND NFR BLD MANUAL: 6 % (ref 0–8)
NEUTS SEG NFR BLD AUTO: 92 % (ref 43–75)
PLATELET # BLD AUTO: 182 THOUSANDS/UL (ref 149–390)
PLATELET BLD QL SMEAR: ADEQUATE
PMV BLD AUTO: 11.3 FL (ref 8.9–12.7)
POTASSIUM SERPL-SCNC: 3.7 MMOL/L (ref 3.5–5.3)
RBC # BLD AUTO: 4.7 MILLION/UL (ref 3.88–5.62)
SODIUM SERPL-SCNC: 134 MMOL/L (ref 135–147)
VANCOMYCIN SERPL-MCNC: 26.1 UG/ML (ref 10–20)
WBC # BLD AUTO: 12.36 THOUSAND/UL (ref 4.31–10.16)

## 2025-01-05 PROCEDURE — 82948 REAGENT STRIP/BLOOD GLUCOSE: CPT

## 2025-01-05 PROCEDURE — 99222 1ST HOSP IP/OBS MODERATE 55: CPT | Performed by: PODIATRIST

## 2025-01-05 PROCEDURE — NC001 PR NO CHARGE: Performed by: STUDENT IN AN ORGANIZED HEALTH CARE EDUCATION/TRAINING PROGRAM

## 2025-01-05 PROCEDURE — 99232 SBSQ HOSP IP/OBS MODERATE 35: CPT | Performed by: PHYSICIAN ASSISTANT

## 2025-01-05 PROCEDURE — 85007 BL SMEAR W/DIFF WBC COUNT: CPT | Performed by: INTERNAL MEDICINE

## 2025-01-05 PROCEDURE — 80048 BASIC METABOLIC PNL TOTAL CA: CPT | Performed by: INTERNAL MEDICINE

## 2025-01-05 PROCEDURE — 99232 SBSQ HOSP IP/OBS MODERATE 35: CPT | Performed by: SURGERY

## 2025-01-05 PROCEDURE — G0545 PR INHERENT VISIT TO INPT: HCPCS | Performed by: INTERNAL MEDICINE

## 2025-01-05 PROCEDURE — 99223 1ST HOSP IP/OBS HIGH 75: CPT | Performed by: INTERNAL MEDICINE

## 2025-01-05 PROCEDURE — 85027 COMPLETE CBC AUTOMATED: CPT | Performed by: INTERNAL MEDICINE

## 2025-01-05 PROCEDURE — 80202 ASSAY OF VANCOMYCIN: CPT | Performed by: INTERNAL MEDICINE

## 2025-01-05 RX ORDER — INSULIN GLARGINE 100 [IU]/ML
20 INJECTION, SOLUTION SUBCUTANEOUS
Status: DISCONTINUED | OUTPATIENT
Start: 2025-01-05 | End: 2025-01-06

## 2025-01-05 RX ORDER — INSULIN LISPRO 100 [IU]/ML
5 INJECTION, SOLUTION INTRAVENOUS; SUBCUTANEOUS
Status: DISCONTINUED | OUTPATIENT
Start: 2025-01-05 | End: 2025-01-09 | Stop reason: HOSPADM

## 2025-01-05 RX ORDER — VANCOMYCIN HYDROCHLORIDE 1 G/200ML
1000 INJECTION, SOLUTION INTRAVENOUS DAILY PRN
Status: DISCONTINUED | OUTPATIENT
Start: 2025-01-05 | End: 2025-01-08

## 2025-01-05 RX ORDER — SODIUM CHLORIDE, SODIUM GLUCONATE, SODIUM ACETATE, POTASSIUM CHLORIDE, MAGNESIUM CHLORIDE, SODIUM PHOSPHATE, DIBASIC, AND POTASSIUM PHOSPHATE .53; .5; .37; .037; .03; .012; .00082 G/100ML; G/100ML; G/100ML; G/100ML; G/100ML; G/100ML; G/100ML
50 INJECTION, SOLUTION INTRAVENOUS CONTINUOUS
Status: DISPENSED | OUTPATIENT
Start: 2025-01-05 | End: 2025-01-07

## 2025-01-05 RX ADMIN — CEFAZOLIN SODIUM 2000 MG: 2 SOLUTION INTRAVENOUS at 01:22

## 2025-01-05 RX ADMIN — INSULIN GLARGINE 20 UNITS: 100 INJECTION, SOLUTION SUBCUTANEOUS at 22:09

## 2025-01-05 RX ADMIN — Medication 1 TABLET: at 08:16

## 2025-01-05 RX ADMIN — HEPARIN SODIUM 5000 UNITS: 5000 INJECTION, SOLUTION INTRAVENOUS; SUBCUTANEOUS at 22:08

## 2025-01-05 RX ADMIN — INSULIN LISPRO 2 UNITS: 100 INJECTION, SOLUTION INTRAVENOUS; SUBCUTANEOUS at 08:23

## 2025-01-05 RX ADMIN — OMEGA-3 FATTY ACIDS CAP 1000 MG 1000 MG: 1000 CAP at 08:18

## 2025-01-05 RX ADMIN — FENOFIBRATE 145 MG: 145 TABLET ORAL at 08:14

## 2025-01-05 RX ADMIN — HEPARIN SODIUM 5000 UNITS: 5000 INJECTION, SOLUTION INTRAVENOUS; SUBCUTANEOUS at 13:27

## 2025-01-05 RX ADMIN — INSULIN LISPRO 3 UNITS: 100 INJECTION, SOLUTION INTRAVENOUS; SUBCUTANEOUS at 13:14

## 2025-01-05 RX ADMIN — DULOXETINE HYDROCHLORIDE 30 MG: 30 CAPSULE, DELAYED RELEASE ORAL at 08:17

## 2025-01-05 RX ADMIN — RANOLAZINE 1000 MG: 500 TABLET, FILM COATED, EXTENDED RELEASE ORAL at 17:32

## 2025-01-05 RX ADMIN — SODIUM CHLORIDE, SODIUM GLUCONATE, SODIUM ACETATE, POTASSIUM CHLORIDE, MAGNESIUM CHLORIDE, SODIUM PHOSPHATE, DIBASIC, AND POTASSIUM PHOSPHATE 75 ML/HR: .53; .5; .37; .037; .03; .012; .00082 INJECTION, SOLUTION INTRAVENOUS at 13:18

## 2025-01-05 RX ADMIN — PREGABALIN 150 MG: 75 CAPSULE ORAL at 17:32

## 2025-01-05 RX ADMIN — INSULIN LISPRO 1 UNITS: 100 INJECTION, SOLUTION INTRAVENOUS; SUBCUTANEOUS at 22:08

## 2025-01-05 RX ADMIN — ATORVASTATIN CALCIUM 40 MG: 40 TABLET, FILM COATED ORAL at 22:08

## 2025-01-05 RX ADMIN — HEPARIN SODIUM 5000 UNITS: 5000 INJECTION, SOLUTION INTRAVENOUS; SUBCUTANEOUS at 06:49

## 2025-01-05 RX ADMIN — PANTOPRAZOLE SODIUM 40 MG: 40 TABLET, DELAYED RELEASE ORAL at 07:54

## 2025-01-05 RX ADMIN — INSULIN LISPRO 5 UNITS: 100 INJECTION, SOLUTION INTRAVENOUS; SUBCUTANEOUS at 13:15

## 2025-01-05 RX ADMIN — CEFAZOLIN SODIUM 2000 MG: 2 SOLUTION INTRAVENOUS at 07:54

## 2025-01-05 RX ADMIN — PREGABALIN 150 MG: 75 CAPSULE ORAL at 08:18

## 2025-01-05 RX ADMIN — PENTOXIFYLLINE 400 MG: 400 TABLET, EXTENDED RELEASE ORAL at 07:55

## 2025-01-05 RX ADMIN — PENTOXIFYLLINE 400 MG: 400 TABLET, EXTENDED RELEASE ORAL at 17:31

## 2025-01-05 RX ADMIN — PENTOXIFYLLINE 400 MG: 400 TABLET, EXTENDED RELEASE ORAL at 13:17

## 2025-01-05 RX ADMIN — OMEGA-3 FATTY ACIDS CAP 1000 MG 1000 MG: 1000 CAP at 17:30

## 2025-01-05 RX ADMIN — PREGABALIN 150 MG: 75 CAPSULE ORAL at 22:08

## 2025-01-05 RX ADMIN — CLOPIDOGREL BISULFATE 75 MG: 75 TABLET ORAL at 08:17

## 2025-01-05 RX ADMIN — INSULIN LISPRO 5 UNITS: 100 INJECTION, SOLUTION INTRAVENOUS; SUBCUTANEOUS at 18:28

## 2025-01-05 RX ADMIN — RANOLAZINE 1000 MG: 500 TABLET, FILM COATED, EXTENDED RELEASE ORAL at 08:17

## 2025-01-05 NOTE — ASSESSMENT & PLAN NOTE
Wt Readings from Last 3 Encounters:   01/03/25 99.9 kg (220 lb 4 oz)   12/23/24 104 kg (230 lb)   12/23/24 104 kg (229 lb)     Echo (12/13/24) - EF 50%, abnormal diastolic function  Torsemide held and receiving gentle IV fluids due to IVY  Monitor volume status

## 2025-01-05 NOTE — ASSESSMENT & PLAN NOTE
Follow-up with ID  Hold vancomycin if level greater than 20  Blood cultures from 1/3 positive for staph epi

## 2025-01-05 NOTE — PROGRESS NOTES
Thomas Horne is a 79 y.o. male who is currently ordered Vancomycin IV with management by the Pharmacy Consult service.  Relevant clinical data and objective / subjective history reviewed.  Vancomycin Assessment:  Indication and Goal AUC/Trough: Bacteremia (goal -600, trough >10)  Clinical Status: stable  Micro:     Renal Function:  SCr: 2.75 mg/dL  CrCl: 26 mL/min  Renal replacement: Not on dialysis  Days of Therapy: 3  Current Dose: no dose today  Vancomycin Plan:  New Dosin gm IV daily as needed for when random vancomycin level is <15 mcg/mL    Estimated AUC: na mcg*hr/mL  Estimated Trough: na mcg/mL  Next Level: 1-6  Renal Function Monitoring: Daily BMP and UOP  Pharmacy will continue to follow closely for s/sx of nephrotoxicity, infusion reactions and appropriateness of therapy.  BMP and CBC will be ordered per protocol. We will continue to follow the patient’s culture results and clinical progress daily.    Amrit Nation, Pharmacist

## 2025-01-05 NOTE — ASSESSMENT & PLAN NOTE
Lab Results   Component Value Date    HGBA1C 6.2 (H) 10/14/2024       Recent Labs     01/04/25  1053 01/04/25  1617 01/04/25  2121 01/05/25  0738   POCGLU 211* 200* 181* 250*       Blood Sugar Average: Last 72 hrs:  (P) 215.5

## 2025-01-05 NOTE — PROGRESS NOTES
Progress Note - Hospitalist   Name: Thomas Horne 79 y.o. male I MRN: [id number]  Unit/Bed#: [facility] 424-01 I Date of Admission: 1/4/2025   Date of Service: 1/5/2025 I Hospital Day: 1    Assessment & Plan  Diabetic ulcer of right midfoot associated with diabetes mellitus due to underlying condition, limited to breakdown of skin (HCC)  Patient was sent to the ED at [facility] on 1/3/25 after wound care noted worsening of his right foot ulcer; note underlying history of PAD. Evaluated by vascular surgery in the ED and transferred to [facility] for angiogram and possible bypass redo.  Vascular surgery following, appreciate ongoing recommendations  IR consulted for A-gram, timing TBD but likely later this week  Podiatry following, continue local wound care   ID following, continuing vancomycin for now  Continue ASA, Plavix, statin  Analgesics as needed  PT/OT evaluations  Staphylococcus epidermidis bacteremia  2 of 2 blood cultures from 1/3/2025 positive for gram-positive cocci; BCID with Staph epidermidis  Repeat blood cultures x 2 collected and pending  ID consult appreciated, suspect this may represent contaminant   Continuing IV vancomycin for now pending repeat blood cultures however if negative plan to discontinue  Acute kidney injury superimposed on stage 3b chronic kidney disease (HCC)  Lab Results   Component Value Date    EGFR 20 01/05/2025    EGFR 23 01/04/2025    EGFR 36 01/03/2025    CREATININE 2.75 (H) 01/05/2025    CREATININE 2.45 (H) 01/04/2025    CREATININE 1.73 (H) 01/03/2025   Noted with rise in creatinine to 2.45 on 1/4/2025 increased from baseline of approximately 1.6-1.8  Creatinine uptrending today at 2.75  Appreciate nephrology consult and recommendations  Continue IV fluids for now, likely DC in a.m.  Continue holding losartan/torsemide   Not yet cleared for angiogram  Limit nephrotoxins and avoid hypotension  Monitor intake and output  Daily BMP  Acute metabolic  encephalopathy  Family noting mild confusion 1/5/2025, no focal neurodeficits on exam  Likely multifactorial with component of hospital delirium  Delirium precautions, supportive cares, frequent reorientation  Type 2 diabetes mellitus with diabetic polyneuropathy, with long-term current use of insulin ([facility])  Lab Results   Component Value Date    HGBA1C 6.2 (H) 10/14/2024     Recent Labs     01/04/25  1617 01/04/25  2121 01/05/25  0738 01/05/25  1215   POCGLU 200* 181* 250* 304*     Blood Sugar Average: Last 72 hrs:  (P) 245    Hold oral medications metformin, Jardiance, Januvia  Continued on home dose Lantus 15 units at bedtime + sliding scale insulin  Monitor blood sugars and adjust insulin accordingly - will add scheduled mealtime insulin and increase Lantus to 20 units tonight   Diabetic diet   Chronic diastolic congestive heart failure (HCC)  Wt Readings from Last 3 Encounters:   01/03/25 99.9 kg (220 lb 4 oz)   12/23/24 104 kg (230 lb)   12/23/24 104 kg (229 lb)   Echo (12/13/24) - EF 50%, abnormal diastolic function  Torsemide held and receiving gentle IV fluids per nephrology   Monitor volume status with daily weights, I&Os  PAD (peripheral artery disease) ([facility])  History of bilateral lower extremity bypass   Plan for A-gram as above  Coronary artery disease involving native coronary artery of native heart without angina pectoris  S/p CABG x 3 in 2002 and PCI with drug-eluting stent to distal left circumflex in 2021  Continue Plavix, statin, BB, Ranexa, Imdur  Primary hypertension  Continue home dose Amlodipine and Metoprolol   Losartan and Torsemide held in the setting of IVY  Monitor BP routinely   History of DVT (deep vein thrombosis)  Not on anticoagulation at home currently  Lower extremity Doppler done on 12/13/2024 showed no DVT  Heparin s/c for DVT prophylaxis  S/P placement of cardiac pacemaker  Recent PPM placement on 12/16/2024 noted  Mixed hyperlipidemia  Continue statin, fish oil,  "fenofibrate    VTE Pharmacologic Prophylaxis: VTE Score: 9 High Risk (Score >/= 5) - Pharmacological DVT Prophylaxis Ordered: heparin. Sequential Compression Devices Ordered.    Mobility:   Basic Mobility Inpatient Raw Score: 15  JH-HLM Goal: 4: Move to chair/commode  JH-HLM Achieved: 2: Bed activities/Dependent transfer  JH-HLM Goal NOT achieved. Continue with multidisciplinary rounding and encourage appropriate mobility to improve upon JH-HLM goals.    Patient Centered Rounds: I performed bedside rounds with nursing staff today.   Discussions with Specialists or Other Care Team Provider: primary RN, ID, nephrology    Education and Discussions with Family / Patient: Updated  (daughter) at bedside.    Current Length of Stay: 1 day(s)  Current Patient Status: Inpatient   Certification Statement: The patient will continue to require additional inpatient hospital stay due to pending angiogram and clearance from several specialists  Discharge Plan: Anticipate discharge in >72 hrs to discharge location to be determined pending rehab evaluations.    Code Status: Level 1 - Full Code    Subjective   Patient reports feeling well this morning however daughters at bedside express concerns regarding his confusion, tremors and pain.  Patient reportedly was held in ED at [facility] for a long time, and missed a few doses of his \"nerve pain medication\", and he has not been sleeping well or eating much. Patient reports he has been experiencing frequent sharp/shooting pains in his legs, only lasting a few seconds, and he denies pain at this time.  Discussed confusion is likely multifactorial with hospital delirium, lack of sleep, pain medications, elevated renal function all contributing.  Discussed plan to continue IV fluids today and recheck labs in the morning.  Discussed timing of angiogram has yet to be determined.    Objective :  Temp:  [98.1 °F (36.7 °C)-99 °F (37.2 °C)] 98.3 °F (36.8 °C)  HR:  [64-77] " 67  BP: ()/(51-57) 93/52  SpO2:  [93 %-98 %] 98 %  O2 Device: None (Room air)    There is no height or weight on file to calculate BMI.     Input and Output Summary (last 24 hours):     Intake/Output Summary (Last 24 hours) at 1/5/2025 1243  Last data filed at 1/5/2025 1150  Gross per 24 hour   Intake --   Output 400 ml   Net -400 ml       Physical Exam  Vitals and nursing note reviewed.   Constitutional:       Appearance: He is obese.   Cardiovascular:      Rate and Rhythm: Normal rate and regular rhythm.   Pulmonary:      Effort: Pulmonary effort is normal. No respiratory distress.      Breath sounds: Decreased breath sounds present. No wheezing, rhonchi or rales.   Abdominal:      General: Bowel sounds are normal. There is no distension.      Tenderness: There is no abdominal tenderness.   Musculoskeletal:      Right lower leg: No edema.      Left lower leg: No edema.   Neurological:      General: No focal deficit present.      Mental Status: He is alert.           Lines/Drains:              Lab Results: I have reviewed the following results:   Results from last 7 days   Lab Units 01/05/25  0647 01/04/25  0651 01/03/25  1459   WBC Thousand/uL 12.36*   < > 10.06   HEMOGLOBIN g/dL 12.8   < > 14.3   HEMATOCRIT % 39.5   < > 45.3   PLATELETS Thousands/uL 182   < > 307   BANDS PCT % 6  --   --    SEGS PCT %  --   --  78*   LYMPHO PCT % 0*  --  14   MONO PCT % 0*  --  6   EOS PCT % 2  --  1    < > = values in this interval not displayed.     Results from last 7 days   Lab Units 01/05/25  0647   SODIUM mmol/L 134*   POTASSIUM mmol/L 3.7   CHLORIDE mmol/L 98   CO2 mmol/L 25   BUN mg/dL 47*   CREATININE mg/dL 2.75*   ANION GAP mmol/L 11   CALCIUM mg/dL 8.5   GLUCOSE RANDOM mg/dL 120         Results from last 7 days   Lab Units 01/05/25  1215 01/05/25  0738 01/04/25  2121 01/04/25  1617 01/04/25  1053 01/04/25  0710 01/03/25  2312   POC GLUCOSE mg/dl 304* 250* 181* 200* 211* 109 152*               Recent Cultures  (last 7 days):   Results from last 7 days   Lab Units 01/04/25  2217 01/03/25  2035 01/03/25  1513   BLOOD CULTURE  Received in Microbiology Lab. Culture in Progress.  Received in Microbiology Lab. Culture in Progress.  --   --    GRAM STAIN RESULT   --  Gram positive cocci in chains*  Gram positive cocci in chains and clusters* No Polys or Bacteria seen   WOUND CULTURE   --   --  1+ Growth of Staphylococcus aureus*       Imaging Results Review: I reviewed radiology reports from this admission including: xray(s).  Other Study Results Review: No additional pertinent studies reviewed.    Last 24 Hours Medication List:     Current Facility-Administered Medications:     amLODIPine (NORVASC) tablet 2.5 mg, Daily    atorvastatin (LIPITOR) tablet 40 mg, HS    clopidogrel (PLAVIX) tablet 75 mg, Daily    DULoxetine (CYMBALTA) delayed release capsule 30 mg, Daily    fenofibrate (TRICOR) tablet 145 mg, Daily    fish oil capsule 1,000 mg, BID    heparin (porcine) subcutaneous injection 5,000 Units, Q8H CONSTANTINE    HYDROmorphone (DILAUDID) injection 0.5 mg, Q4H PRN    insulin glargine (LANTUS) subcutaneous injection 20 Units 0.2 mL, HS    insulin lispro (HumALOG/ADMELOG) 100 units/mL subcutaneous injection 1-5 Units, TID AC **AND** Fingerstick Glucose (POCT), TID AC    insulin lispro (HumALOG/ADMELOG) 100 units/mL subcutaneous injection 1-5 Units, HS    insulin lispro (HumALOG/ADMELOG) 100 units/mL subcutaneous injection 5 Units, TID With Meals    isosorbide mononitrate (IMDUR) 24 hr tablet 90 mg, Daily    metoprolol succinate (TOPROL-XL) 24 hr tablet 100 mg, Daily    multi-electrolyte (PLASMALYTE-A/ISOLYTE-S PH 7.4) IV solution, Continuous    multivitamin-minerals (CENTRUM) tablet 1 tablet, Daily    nitroglycerin (NITROSTAT) SL tablet 0.4 mg, Q5 Min PRN    oxyCODONE (ROXICODONE) IR tablet 5 mg, Q12H PRN    pantoprazole (PROTONIX) EC tablet 40 mg, Daily Before Breakfast    pentoxifylline (TRENtal) ER tablet 400 mg, TID With Meals     pregabalin (LYRICA) capsule 150 mg, TID    ranolazine (RANEXA) 12 hr tablet 1,000 mg, BID    [Held by provider] torsemide (DEMADEX) tablet 20 mg, Daily    vancomycin (VANCOCIN) 1500 mg in sodium chloride 0.9% 250 mL IVPB, Daily PRN    Administrative Statements   Today, Patient Was Seen By: Cinthya Sagastume PA-C  I have spent a total time of 45 minutes in caring for this patient on the day of the visit/encounter including Patient and family education, Impressions, Counseling / Coordination of care, Documenting in the medical record, Reviewing / ordering tests, medicine, procedures  , Obtaining or reviewing history  , and Communicating with other healthcare professionals .    **Please Note: This note may have been constructed using a voice recognition system.**

## 2025-01-05 NOTE — ASSESSMENT & PLAN NOTE
This is likely multifactorial, secondary to IVY and foot/toe ischemia.  Will defer any workup to primary service.  Monitor mental status.  Workup per primary service.

## 2025-01-05 NOTE — ASSESSMENT & PLAN NOTE
Not on anticoagulation at home currently  Lower extremity Doppler done on 12/13/2024 showed no DVT  Heparin s/c for DVT prophylaxis

## 2025-01-05 NOTE — ASSESSMENT & PLAN NOTE
Lab Results   Component Value Date    HGBA1C 6.2 (H) 10/14/2024       Recent Labs     01/04/25  0710 01/04/25  1053 01/04/25  1617 01/04/25 2121   POCGLU 109 211* 200* 181*       Blood Sugar Average: Last 72 hrs:  (P) 181    Continue Lantus 15 units at bedtime, sliding scale insulin  Hold oral medications metformin, Jardiance, Januvia  Monitor blood sugars and adjust insulin accordingly

## 2025-01-05 NOTE — PROGRESS NOTES
Thomas Horne is a 79 y.o. male who is currently ordered Vancomycin IV with management by the Pharmacy Consult service.  Relevant clinical data and objective / subjective history reviewed.  Vancomycin Assessment:  Indication and Goal AUC/Trough: Bacteremia (goal -600, trough >10)  Micro:     Renal Function:  SCr: 2.45 mg/dL  CrCl: 30 mL/min  Renal replacement: Not on dialysis  Days of Therapy: 2  Current Dose: vancomycin 2000 mg IV x1 dose  Vancomycin Plan:  New Dosing: vancomycin 1500 mg IV daily as needed for when random vancomycin level is <15 mcg/mL  Next Level: 1/5/25 @ 0600  Renal Function Monitoring: Daily BMP and UOP  Pharmacy will continue to follow closely for s/sx of nephrotoxicity, infusion reactions and appropriateness of therapy.  BMP and CBC will be ordered per protocol. We will continue to follow the patient’s culture results and clinical progress daily.    Hector Griggs, Pharmacist

## 2025-01-05 NOTE — QUICK NOTE
Sepsis time zero for leukocytosis, uptrending Cr, and tachycardia. HR in 90s, BP low normal, WBC mild and stable from yesterday, receiving tx for IVY and IV vanco for GP bacteremia. No need for sepsis alert.

## 2025-01-05 NOTE — ASSESSMENT & PLAN NOTE
Lab Results   Component Value Date    HGBA1C 6.2 (H) 10/14/2024       Recent Labs     01/04/25  1053 01/04/25  1617 01/04/25  2121 01/05/25  0738   POCGLU 211* 200* 181* 250*       Blood Sugar Average: Last 72 hrs:  (P) 215.5  Optimize glycemic control

## 2025-01-05 NOTE — ASSESSMENT & PLAN NOTE
S/p CABG x 3 in 2002 and PCI with drug-eluting stent to distal left circumflex in 2021  Continue Plavix, statin, BB, Ranexa, Imdur

## 2025-01-05 NOTE — CONSULTS
e-Consult (IPC)  - Interventional Radiology  Thomas Horne 79 y.o. male MRN: [id number]  Unit/Bed#: [facility] 424-01 Encounter: [id number]          Interventional Radiology has been consulted to evaluate Thomas Horne    We were consulted by vascular concerning this patient.    Inpatient Consult to IR  Consult performed by: Torey Abraham MD  Consult ordered by: Razia Dumont PA-C        01/05/25    Assessment/Recommendation:   79 yom PMH CLTI, p/w a new right hallux wound.    He has a history of multiple prior interventions, notably including a R CFA to peroneal bypass in 11/23.    IR consulted for arteriography and possible intervention.    We'll plan on intervention later this week pending IR availability, noting need for renal clearance.  NPO order I'll defer to the vascular team for now.    11-20 minutes, >50% of the total time devoted to medical consultative verbal/EMR discussion between providers. Written report will be generated in the EMR.     Thank you for allowing Interventional Radiology to participate in the care of Thomas Horne. Please don't hesitate to call or TigerText us with any questions.     Torey Abraham MD

## 2025-01-05 NOTE — H&P
H&P - Hospitalist   Name: Thomas Horne 79 y.o. male I MRN: [id number]  Unit/Bed#: [facility] 424-01 I Date of Admission: 1/4/2025   Date of Service: 1/5/2025 I Hospital Day: 1     Assessment & Plan  Diabetic ulcer of right midfoot associated with diabetes mellitus due to underlying condition, limited to breakdown of skin ([facility])  Was sent to the ED at [facility] on 1/3/25 after wound care noted worsening of his right foot ulcer  History of PAD.    Was seen by vascular surgery in the ED and transferred to [facility] for a gram and possible bypass redo was recommended  Transferred to [facility] on 1/4/2025  Seen by vascular - IR consulted for A-gram next week, renal consulted for IVY on CKD requiring A-gram   Continue ASA, Plavix, statin  Podiatry consult for foot wound  On Cefazolin -continue  Vancomycin has been added due to gram-positive cocci bacteremia -continue  ID consult  PAD (peripheral artery disease) ([facility])  History of bilateral lower extremity bypass   Plan for A-gram as above  Bacteremia due to Gram-positive bacteria  2 of 2 blood cultures from 1/3/2025 positive for gram-positive cocci  Continue Vancomycin  Repeat blood cultures  ID consult  Acute kidney injury superimposed on stage 3b chronic kidney disease ([facility])  Lab Results   Component Value Date    EGFR 23 01/04/2025    EGFR 36 01/03/2025    EGFR 37 12/19/2024    CREATININE 2.45 (H) 01/04/2025    CREATININE 1.73 (H) 01/03/2025    CREATININE 1.69 (H) 12/19/2024   Baseline creatinine 1 to 1.6  Creatinine was 1.7 on presentation to [facility] and increased to 2.45  Losartan/torsemide held and receiving gentle IV fluids  Monitor creatinine  Nephrology consulted as above as needs A-gram  Acute on chronic HFpEF/Moderate pHTN (HFpEF) ([facility])  Wt Readings from Last 3 Encounters:   01/03/25 99.9 kg (220 lb 4 oz)   12/23/24 104 kg (230 lb)   12/23/24 104 kg (229 lb)     Echo (12/13/24) - EF 50%, abnormal diastolic function  Torsemide  held and receiving gentle IV fluids due to IVY  Monitor volume status          Coronary artery disease involving native coronary artery of native heart without angina pectoris  S/p CABG x 3 in 2002 and PCI with drug-eluting stent to distal left circumflex in 2021  Continue Plavix, statin, BB, Ranexa, Imdur  Primary hypertension  Home Amlodipine and Metoprolol continued  Losartan and Torsemide held in the setting of acute kidney injury  Monitor BP  Mixed hyperlipidemia  Continue statin, fish oil, fenofibrate  Type 2 diabetes mellitus with diabetic polyneuropathy, with long-term current use of insulin ([facility])  Lab Results   Component Value Date    HGBA1C 6.2 (H) 10/14/2024       Recent Labs     01/04/25  0710 01/04/25  1053 01/04/25  1617 01/04/25  2121   POCGLU 109 211* 200* 181*       Blood Sugar Average: Last 72 hrs:  (P) 181    Continue Lantus 15 units at bedtime, sliding scale insulin  Hold oral medications metformin, Jardiance, Januvia  Monitor blood sugars and adjust insulin accordingly  S/P placement of cardiac pacemaker  Noted  History of DVT (deep vein thrombosis)  Not on anticoagulation at home currently  Lower extremity Doppler done on 12/13/2024 showed no DVT  Heparin s/c for DVT prophylaxis      VTE Pharmacologic Prophylaxis: VTE Score: 9 High Risk (Score >/= 5) - Pharmacological DVT Prophylaxis Ordered: heparin. Sequential Compression Devices Ordered.  Code Status: Level 1 - Full Code   Discussion with family: Patient declined call to .     Anticipated Length of Stay: Patient will be admitted on an inpatient basis with an anticipated length of stay of greater than 2 midnights secondary to nonhealing foot ulcer with peripheral arterial disease.    History of Present Illness   Chief Complaint: Right foot wound    Thomas Horne is a 79 y.o. male with a PMH of chronic heart failure with preserved ejection fraction, peripheral arterial disease, CKD 3, coronary artery disease, history of DVT, s/p  pacemaker, multiple gastric ulcers who presents as a transfer from [facility] for angiogram and vascular evaluation/intervention.  He presented there on 1/3/2025 as wound care noted his right foot ulcer was getting worse.  No fever or chills.  No nausea, vomiting or diarrhea.  He complains of intermittent pain in both legs and feet from last night with episodes lasting 5 seconds.     Review of Systems   Musculoskeletal:         Bilateral leg and feet pain   Skin:  Positive for wound.   All other systems reviewed and are negative.      Historical Information   Past Medical History:   Diagnosis Date    Anemia     CAD (coronary artery disease)     Callus     Cancer (HCC)     prostate    CHF (congestive heart failure) (HCC)     Chronic kidney disease     Clotting disorder (HCC)     Coronary artery disease 1988    Deep vein thrombosis (HCC)     Diabetes mellitus (HCC)     Difficulty walking     Duodenal ulcer     Ear problems     Heart disease 1988    HL (hearing loss)     Hyperlipidemia     Hypertension     Myocardial infarction (HCC)     Neuropathy     Bilateral feet    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)     Plantar fasciitis     Sleep apnea     Could not tolerate CPAP     Past Surgical History:   Procedure Laterality Date    ABDOMINAL AORTIC ANEURYSM REPAIR      Stented    ADENOIDECTOMY      BACK SURGERY  1985    CARDIAC CATHETERIZATION Left 10/19/2022    Procedure: Cardiac Left Heart Cath;  Surgeon: Danielle Pereira MD;  Location: [facility];  Service: Cardiology    CARDIAC ELECTROPHYSIOLOGY PROCEDURE Left 12/16/2024    Procedure: Cardiac pacer implant;  Surgeon: Danielle Pereira MD;  Location: [facility];  Service: Cardiology    CARDIAC SURGERY  2002    3 cardiac bypass then angioplasty 7/2020    CHOLECYSTECTOMY      COLONOSCOPY  02/14/2024    CORONARY ARTERY BYPASS GRAFT      IR LOWER EXTREMITY ANGIOGRAM  11/01/2023    IR LOWER EXTREMITY ANGIOGRAM  08/07/2024    LAMINECTOMY  1990    SD BYPASS  W/VEIN FEMORAL-POPLITEAL Right 2023    Procedure: BYPASS FEMORAL-POPLITEAL WITH CRYO VEIN, RIGHT FEMORAL ENDARTERECTOMY;  Surgeon: Vasquez Clark MD;  Location: [facility];  Service: Vascular    MT BYPASS W/VEIN FEMORAL-POPLITEAL Left 2024    Procedure: left lower extremity above knee popliteal to below knee popliteal artery bypass with PTFE graft;  Surgeon: Vasquez Clark MD;  Location: [facility];  Service: Vascular    MT SLCTV CATHJ 3RD+ ORD SLCTV ABDL PEL/LXTR BRNCH Right 2023    Procedure: ARTERIOGRAM Right lower extremity arteriogram with CO2 via right groin access;  Surgeon: Vasquez Clark MD;  Location: [facility];  Service: Vascular    MT SLCTV CATHJ 3RD+ ORD SLCTV ABDL PEL/LXTR BRNCH Left 2024    Procedure: diagnostic LLE Arteriogram;  Surgeon: Vasquez Clark MD;  Location: [facility];  Service: Vascular    PROSTATE SURGERY      TONSILLECTOMY      URINARY SPHINCTER IMPLANT       Social History     Tobacco Use    Smoking status: Former     Current packs/day: 0.00     Average packs/day: 1.5 packs/day for 35.0 years (52.5 ttl pk-yrs)     Types: Cigarettes     Start date: 1956     Quit date:      Years since quittin.0     Passive exposure: Past    Smokeless tobacco: Never   Vaping Use    Vaping status: Never Used   Substance and Sexual Activity    Alcohol use: Yes     Alcohol/week: 14.0 standard drinks of alcohol     Types: 14 Cans of beer per week     Comment: stopped last few months    Drug use: Never    Sexual activity: Not Currently     Partners: Female     Birth control/protection: Male Sterilization     E-Cigarette/Vaping    E-Cigarette Use Never User      E-Cigarette/Vaping Substances    Nicotine No     THC No     CBD No     Flavoring No     Other No     Unknown No      Family History   Problem Relation Age of Onset    Diabetes Mother     Alcohol abuse Father     Heart disease Brother     Cancer Sister          Thyroid    Cancer Sister         Colon    Cancer Brother         Throat    Thyroid disease Brother     Cancer Brother     Diabetes Sister     Thyroid disease Sister     Mental illness Neg Hx      Social History:  Marital Status:      Meds/Allergies   I have reviewed home medications with patient personally.  Prior to Admission medications    Medication Sig Start Date End Date Taking? Authorizing Provider   acetaminophen (TYLENOL) 325 mg tablet Take 2 tablets (650 mg total) by mouth every 6 (six) hours as needed for mild pain 9/4/24   APOLLO Abrams   amLODIPine (NORVASC) 2.5 mg tablet Take 1 tablet (2.5 mg total) by mouth daily 12/17/24   APOLLO Antonio   aspirin 81 mg chewable tablet Chew 81 mg daily  Patient not taking: Reported on 1/3/2025    Historical Provider, MD   atorvastatin (LIPITOR) 40 mg tablet TAKE 1 TABLET BY MOUTH DAILY 9/30/24   Naa Cruz PA-C   Blood Glucose Monitoring Suppl (OneTouch Verio Reflect) w/Device KIT Check blood sugars twice daily. Please substitute with appropriate alternative as covered by patient's insurance. Dx: E11.65 2/22/22   Frank Lombardi, DO   clopidogrel (PLAVIX) 75 mg tablet Take 1 tablet (75 mg total) by mouth daily 9/4/24   APOLLO Abrams   Droplet Pen Needles 32G X 4 MM MISC USE EVERY EVENING 9/27/23   Frank Lombardi, DO   DULoxetine (CYMBALTA) 30 mg delayed release capsule Take 1 capsule (30 mg total) by mouth daily 12/4/24   Sundar Arango DPM   Empagliflozin (Jardiance) 25 MG TABS TAKE 1 TABLET BY MOUTH DAILY 12/23/24   Frank Lombardi, DO   fenofibrate 160 MG tablet TAKE 1 TABLET BY MOUTH DAILY 12/24/24   Danielle Pereira MD   insulin glargine (LANTUS) 100 units/mL subcutaneous injection Inject 15 Units under the skin daily at bedtime 11/21/23   APOLLO Kwong   isosorbide mononitrate (IMDUR) 30 mg 24 hr tablet TAKE 3 TABLETS BY MOUTH DAILY 9/27/24   Naa Cruz PA-C   losartan (COZAAR) 25 mg tablet Take 1 tablet (25 mg total) by  mouth daily 12/17/24   APOLLO Antonio   metFORMIN (GLUCOPHAGE) 500 mg tablet TAKE 1 TABLET BY MOUTH TWICE  DAILY WITH MEALS 12/4/24   Frank Lombardi, DO   metoprolol succinate (TOPROL-XL) 100 mg 24 hr tablet Take 1 tablet (100 mg total) by mouth daily 12/17/24   APOLLO Antonio   Multiple Vitamins-Minerals (MULTIVITAMIN MEN 50+ PO) Take by mouth daily    Historical Provider, MD   nitroglycerin (NITROSTAT) 0.4 mg SL tablet Place 1 tablet (0.4 mg total) under the tongue every 5 (five) minutes as needed for chest pain 5/10/24 12/20/24  Naa Cruz PA-C   Omega-3 Fatty Acids (fish oil) 1,000 mg Take 4,000 mg by mouth 2 (two) times a day    Historical Provider, MD   OneTouch Delica Lancets 33G MISC Check blood sugars twice daily. Please substitute with appropriate alternative as covered by patient's insurance. Dx: E11.65 2/22/22   Frank Lombardi, DO   oxyCODONE-acetaminophen (PERCOCET) 5-325 mg per tablet Take 1 tablet by mouth 2 (two) times a day as needed for moderate pain or severe pain    Historical Provider, MD   pantoprazole (PROTONIX) 40 mg tablet TAKE 1 TABLET BY MOUTH DAILY 9/30/24   Tj Paulino PA-C   pentoxifylline (TRENtal) 400 mg ER tablet TAKE 1 TABLET BY MOUTH 3 TIMES  DAILY WITH MEALS 9/16/24   Sundar Arango DPM   pregabalin (LYRICA) 150 mg capsule Take 1 capsule (150 mg total) by mouth 3 (three) times a day 10/10/24 1/8/25  Kirk Acuna MD   ranolazine (RANEXA) 1000 MG SR tablet Take 1 tablet (1,000 mg total) by mouth 2 (two) times a day 5/10/24 5/5/25  Naa Cruz PA-C   sitaGLIPtin (JANUVIA) 25 mg tablet Take 1 tablet (25 mg total) by mouth daily 12/18/24   Arthur Cavazos DO   sodium hypochlorite (DAKIN'S HALF-STRENGTH) external solution Apply 1 Application topically every other day At each dressing change  Patient not taking: Reported on 12/20/2024 10/15/24   Nitza Sotelo DO   torsemide (DEMADEX) 20 mg tablet TAKE 1 TABLET BY MOUTH DAILY 11/27/24   Danielle Pereira,  MD     Allergies   Allergen Reactions    Lisinopril Rash and Lip Swelling       Objective :  Temp:  [97.3 °F (36.3 °C)-99 °F (37.2 °C)] 98.1 °F (36.7 °C)  HR:  [64-79] 69  BP: ()/(50-57) 98/51  Resp:  [20] 20  SpO2:  [93 %-96 %] 93 %  O2 Device: None (Room air)    Physical Exam  Vitals reviewed.   HENT:      Head: Normocephalic.      Nose: Nose normal.      Mouth/Throat:      Mouth: Mucous membranes are moist.   Eyes:      Extraocular Movements: Extraocular movements intact.   Cardiovascular:      Rate and Rhythm: Normal rate and regular rhythm.   Pulmonary:      Effort: Pulmonary effort is normal. No respiratory distress.      Breath sounds: Normal breath sounds. No wheezing.   Abdominal:      General: Bowel sounds are normal. There is no distension.      Palpations: Abdomen is soft.      Tenderness: There is no abdominal tenderness.   Musculoskeletal:         General: No swelling.      Cervical back: Neck supple.   Skin:     Comments: Right foot ulcer   Neurological:      General: No focal deficit present.      Mental Status: He is alert and oriented to person, place, and time.   Psychiatric:         Mood and Affect: Mood normal.         Behavior: Behavior normal.            Lab Results: I have reviewed the following results:  Results from last 7 days   Lab Units 01/04/25  0651 01/03/25  1459   WBC Thousand/uL 13.98* 10.06   HEMOGLOBIN g/dL 11.9* 14.3   HEMATOCRIT % 36.4* 45.3   PLATELETS Thousands/uL 224 307   SEGS PCT %  --  78*   LYMPHO PCT %  --  14   MONO PCT %  --  6   EOS PCT %  --  1     Results from last 7 days   Lab Units 01/04/25  0651   SODIUM mmol/L 133*   POTASSIUM mmol/L 3.8   CHLORIDE mmol/L 100   CO2 mmol/L 28   BUN mg/dL 42*   CREATININE mg/dL 2.45*   ANION GAP mmol/L 5   CALCIUM mg/dL 8.5   GLUCOSE RANDOM mg/dL 111         Results from last 7 days   Lab Units 01/04/25  2121 01/04/25  1617 01/04/25  1053 01/04/25  0710 01/03/25  2312   POC GLUCOSE mg/dl 181* 200* 211* 109 152*     Lab  Results   Component Value Date    HGBA1C 6.2 (H) 10/14/2024    HGBA1C 7.0 (H) 09/03/2024    HGBA1C 7.0 (H) 04/17/2024           Imaging Results Review: I reviewed radiology reports from this admission including: X-ray right foot.      Administrative Statements   I have spent a total time of 75 minutes in caring for this patient on the day of the visit/encounter including Risk factor reductions, Impressions, Counseling / Coordination of care, Documenting in the medical record, Reviewing / ordering tests, medicine, procedures  , Obtaining or reviewing history  , and Communicating with other healthcare professionals .    ** Please Note: This note has been constructed using a voice recognition system. **

## 2025-01-05 NOTE — ASSESSMENT & PLAN NOTE
Ulcer appears to be ischemic on examination, without any signs of cellulitis.  Arteriogram is pending for next week.  No antibiotic needed for this indication.  No antibiotic needed for this indication.  Serial foot exams.

## 2025-01-05 NOTE — UTILIZATION REVIEW
"    Initial Clinical Review    Admission: Date/Time/Statement:   Admission Orders (From admission, onward)       Ordered        01/03/25 1920  INPATIENT ADMISSION  Once                          Orders Placed This Encounter   Procedures    INPATIENT ADMISSION     Standing Status:   Standing     Number of Occurrences:   1     Level of Care:   Med Surg [16]     Estimated length of stay:   More than 2 Midnights     Certification:   I certify that inpatient services are medically necessary for this patient for a duration of greater than two midnights. See H&P and MD Progress Notes for additional information about the patient's course of treatment.     ED Arrival Information       Expected   -    Arrival   1/3/2025 11:49    Acuity   Emergent              Means of arrival   Walk-In    Escorted by   Self    Service   Hospitalist    Admission type   Emergency              Arrival complaint   right foot toe problem             Chief Complaint   Patient presents with    Wound Infection - Complicated     States sent from Wound Care \" because I have a bone infection in my right great toe and the doctor said she would call \". Patient then states the doctor could not feel much of a pulse and there is not much blood flow \".        Initial Presentation: 79 y.o. male presents to ed on 1-3 from wound center for evaluation and treatment of non healing  right great toe wound. PMHX:  CHF, CKD, DM, HTN, MI, prostate CA, BLLE bypass, CABG.   Clinical assessment significant for temp 96.8  wound to right great toe ( see photos) able to probe to the bone.  Unable to palpate pedal pulses.  Foot remains warm and perfused.   Poor arterial flow to foot. Sed rate 52, Bun 34, Cr 1.73, Crp 10.7.  Imaging concerning for right great toe osteomyelitis.  Initially treated with iv vancomycin. Admit to inpatient med surg diabetic right foot ulcer, PAD.  Plan to consult ID , start iv ancef, local wound care. Due to new pacemaker will defer MRI and discuss " with podiatry.    Anticipated Length of Stay/Certification Statement:  Patient will be admitted on an inpatient basis with an anticipated length of stay of greater than 2 midnights secondary to nonhealing foot ulcer with peripheral arterial disease.     Date: 1-4-24    Day 2: inpatient med surg  1 of 2 blood cultures positive for gram + cocci.  Continue iv vancomycin, repeat blood cultures, consult ID.  Wound culture pending.  Creatinine increased 1.73> 2.45 ( baseline 1-1.6). monitor renal function closely on iv vancomycin.  Plan for A gram.       Discharged to higher level of care  presented to the hospital on 1/3/2025 due to nonhealing right big toe wound which started about 6 weeks ago.  Patient was admitted to the hospital for same and was started on IV Ancef.  Patient has known history of significant PAD and patient was admitted with vascular surgery in the ED and recommended transfer to [place] for angiogram and possible bypass redo.  During hospital stay, patient with worsening kidney function and blood cultures 1 out of 2 are also positive.  Patient's diuretics were held and patient continued on gentle IV hydration.  Patient as needed vancomycin.  Patient to be transferred to [facility] for vascular surgery evaluation       ED Treatment-Medication Administration from 01/03/2025 1149 to 01/03/2025 2103         Date/Time Order Dose Route Action     01/03/2025 1514 vancomycin (VANCOCIN) 2,000 mg in sodium chloride 0.9 % 500 mL IVPB 2,000 mg Intravenous New Bag                   ED Triage Vitals   Temperature Pulse Respirations Blood Pressure SpO2 Pain Score   01/03/25 1241 01/03/25 1241 01/03/25 1241 01/03/25 1242 01/03/25 1242 01/03/25 1242   (!) 96.8 °F   (36 °C) 73 20 111/62 98 % No Pain     Weight (last 2 days) before discharge       Date/Time Weight    01/03/25 2124 99.9 (220.25)    01/03/25 1242 103 (227.2)            Vital Signs (last 3 days) before discharge       Date/Time Temp Pulse Resp  BP MAP (mmHg) SpO2 O2 Device Pain    01/04/25 1500 99 °F (37.2 °C) 64 -- 113/55 135 95 % -- --    01/04/25 1237 -- -- -- -- -- -- -- 7    01/04/25 1100 -- -- -- -- -- 96 % None (Room air) --    01/04/25 0700 97.3 °F (36.3 °C) 79 20 97/56 71 96 % None (Room air) 10 - Worst Possible Pain    01/04/25 0252 98.2 °F (36.8 °C) 73 20 98/50 68 96 % -- --    01/04/25 0150 -- -- -- -- -- -- -- 10 - Worst Possible Pain    01/03/25 2334 97.9 °F (36.6 °C) 68 18 102/53 69 96 % None (Room air) --    01/03/25 2124 97.9 °F (36.6 °C) 69 18 103/54 75 95 % None (Room air) --    01/03/25 2030 -- 65 18 113/55 79 94 % None (Room air) --    01/03/25 2000 -- 70 18 115/50 78 94 % None (Room air) --    01/03/25 1945 -- 71 18 141/64 92 90 % None (Room air) --    01/03/25 1800 -- 70 18 133/63 -- 99 % -- --    01/03/25 1527 -- 66 18 115/66 -- 98 % -- --    01/03/25 1242 -- -- -- 111/62 -- 98 % None (Room air) No Pain    01/03/25 1241 96.8 °F (36 °C) 73 20 -- -- -- -- --         Pertinent Labs/Diagnostic Test Results:                   Radiology:  XR foot 3+ views RIGHT   Final    (01/03 1544)      No acute osseous abnormality.   Ulcer distal great toe without underlying osseous changes to suggest osteomyelitis. Consider MRI for further evaluation as clinically dictated.           Cardiology:  No orders to display     GI:  No orders to display           Results from last 7 days   Lab Units 01/04/25  0651 01/03/25  1459   WBC Thousand/uL 13.98* 10.06   HEMOGLOBIN g/dL 11.9* 14.3   HEMATOCRIT % 36.4* 45.3   PLATELETS Thousands/uL 224 307   TOTAL NEUT ABS Thousands/µL  --  7.91*   BANDS PCT %  --   --          Results from last 7 days   Lab Units 01/04/25  0651 01/03/25  1459   SODIUM mmol/L 133* 135   POTASSIUM mmol/L 3.8 4.5   CHLORIDE mmol/L 100 100   CO2 mmol/L 28 30   ANION GAP mmol/L 5 5   BUN mg/dL 42* 34*   CREATININE mg/dL 2.45* 1.73*   EGFR ml/min/1.73sq m 23 36   CALCIUM mg/dL 8.5 9.9   MAGNESIUM mg/dL 1.5*  --          Results from last 7  days   Lab Units 01/04/25  1053 01/04/25  0710 01/03/25  2312   POC GLUCOSE mg/dl 211* 109 152*     Results from last 7 days   Lab Units 01/05/25  0647 01/04/25  0651 01/03/25  1459   GLUCOSE RANDOM mg/dL 120 111 138         Results from last 7 days   Lab Units 01/03/25  1459   CRP mg/L 10.7*   SED RATE mm/hour 52*         Results from last 7 days   Lab Units 01/04/25  2217 01/03/25  2035 01/03/25  1513   BLOOD CULTURE  Received in Microbiology Lab. Culture in Progress.  Received in Microbiology Lab. Culture in Progress. Alpha Hemolytic Streptococcus*      Alpha Hemolytic Streptococcus*      Staphylococcus epidermidis*  --    GRAM STAIN RESULT   --  Gram positive cocci in chains*      Gram positive cocci in chains and clusters* No Polys or Bacteria seen   WOUND CULTURE   --   --  1+ Growth of Staphylococcus aureus*             Results from last 7 days   Lab Units 01/05/25  0756   VANCOMYCIN RM ug/mL 26.1*       Past Medical History:   Diagnosis Date    Anemia     CAD (coronary artery disease)     Callus     Cancer ([facility])     prostate    CHF (congestive heart failure) ([facility])     Chronic kidney disease     Clotting disorder ([facility])     Coronary artery disease 1988    Deep vein thrombosis (HCC)     Diabetes mellitus ([facility])     Difficulty walking     Duodenal ulcer     Ear problems     Heart disease 1988    HL (hearing loss)     Hyperlipidemia     Hypertension     Myocardial infarction ([facility])     Neuropathy     Bilateral feet    Neuropathy in diabetes ([facility])     Plantar fasciitis     Sleep apnea     Could not tolerate CPAP     Present on Admission:   PAD (peripheral artery disease) ([facility])   Diabetic ulcer of toe of right foot associated with type 2 diabetes mellitus, with fat layer exposed ([facility])   Acute kidney injury superimposed on stage 3b chronic kidney disease ([facility])   Mixed hyperlipidemia   Primary hypertension   Coronary artery disease involving native coronary artery of native heart without angina pectoris   Chronic  diastolic congestive heart failure (HCC)   S/P placement of cardiac pacemaker   Ulcer of right foot (HCC)   Multiple gastric ulcers      Admitting Diagnosis:     Osteomyelitis (HCC) [M86.9]  Wound infection [T14.8XXA, L08.9]  Diabetic ulcer of right midfoot associated with diabetes mellitus due to underlying condition, limited to breakdown of skin (HCC) [E08.621, L97.411]  Age/Sex: 79 y.o. male    Network Utilization Review Department  ATTENTION: Please call with any questions or concerns to [phone] and carefully listen to the prompts so that you are directed to the right person. All voicemails are confidential.   For Discharge needs, contact Care Management DC Support Team at [phone] opt. [phone]  Send all requests for admission clinical reviews, approved or denied determinations and any other requests to dedicated fax number below belonging to the campus where the patient is receiving treatment. List of dedicated fax numbers for the Facilities:  FACILITY NAME UR FAX NUMBER   ADMISSION DENIALS (Administrative/Medical Necessity) [phone]   DISCHARGE SUPPORT TEAM (NETWORK) [phone]   PARENT CHILD HEALTH (Maternity/NICU/Pediatrics) [phone]   [facility] [phone]   [facility] [phone]   [facility] [phone]   [facility] [phone]   [facility] [phone]   [facility] [phone]   [facility] [phone]   [facility] [phone]   [facility] [phone]   [facility] [phone]   [facility] [phone]   [facility] [phone]

## 2025-01-05 NOTE — ASSESSMENT & PLAN NOTE
80 y/o M w/ PAD s/p R CFA-peroneal bypass with reversed CryoVein 11/16/2023, s/p L distal SFA to BK pop bypass with PTFE 8/30/2024 ([place]) presents with nonhealing right hallux wound.    PMHx former smoker with HTN, HLD, CAD s/p CABG, AAA s/p EVAR, DM type II, CHF, pulmonary HTN, CKD, prostate CA s/p prostatectomy with urinary sphincter placement, provoked LLE DVT.    LEAD 12/10-  RIGHT- high grade stenosis vs occlusion distal superficial femoral artery. Posterior tibial and anterior tibial artery occlusive disease. The femoral to popliteal artery bypass graft could not be identified.  FABIOLA 0.47/x/x; PVR/ PPG tracings are dampened.  LEFT- 50-75% stenosis tibioperoneal trunk/distal anastomosis distal superficial femoral- below knee popliteal bypass graft. Peroneal artery occlusive disease.  FABIOLA 0.79/105/51; PVR/ PPG tracings are dampened.    Plan:  -IR consulted for Agram, anticipate at least mid-week  -Continue ASA, Plavix, Statin  -Podiatry consultation for foot wounds   -Nephrology consultation for CKD, renal prep and clearance for Agram  - Remainder of care per primary team

## 2025-01-05 NOTE — ASSESSMENT & PLAN NOTE
Optimize hemodynamic status to avoid renal ischemic injury.  Maintain MAP > 65mmHg  Avoid BP fluctuations.  Home medications: Torsemide 20 mg p.o. daily, Norvasc 2.5 mg p.o. daily, Jardiance 25 mg p.o. daily, losartan 25 mg p.o. daily, Toprol- mg p.o. daily, Imdur 90 mg p.o. daily,   Current medications: Norvasc 2.5 mg p.o. daily, Imdur 90 mg p.o. daily, Toprol- mg p.o. daily  Hold home torsemide, losartan and Jardiance for now  Adjust holding parameters for current meds to hold for SBP less than 130

## 2025-01-05 NOTE — ASSESSMENT & PLAN NOTE
2 of 2 blood cultures from 1/3/2025 positive for gram-positive cocci; BCID with Staph epidermidis  Repeat blood cultures x 2 collected and pending  ID consult appreciated, suspect this may represent contaminant   Continuing IV vancomycin for now pending repeat blood cultures however if negative plan to discontinue

## 2025-01-05 NOTE — ASSESSMENT & PLAN NOTE
Continue home dose Amlodipine and Metoprolol   Losartan and Torsemide held in the setting of IVY  Monitor BP routinely

## 2025-01-05 NOTE — ASSESSMENT & PLAN NOTE
Lab Results   Component Value Date    EGFR 20 01/05/2025    EGFR 23 01/04/2025    EGFR 36 01/03/2025    CREATININE 2.75 (H) 01/05/2025    CREATININE 2.45 (H) 01/04/2025    CREATININE 1.73 (H) 01/03/2025   Noted with rise in creatinine to 2.45 on 1/4/2025 increased from baseline of approximately 1.6-1.8  Creatinine uptrending today at 2.75  Appreciate nephrology consult and recommendations  Continue IV fluids for now, likely DC in a.m.  Continue holding losartan/torsemide   Not yet cleared for angiogram  Limit nephrotoxins and avoid hypotension  Monitor intake and output  Daily BMP

## 2025-01-05 NOTE — PROGRESS NOTES
Discharge to [facility] via [facility] accompanied by transport team, [place] and medical necessity given,  all belongings taken.

## 2025-01-05 NOTE — ASSESSMENT & PLAN NOTE
"Patient has very poor blood draw and IV access.  There appears to be growth of MRSE in \"both\" admission blood cultures.  However, this blood cultures were drawn at the same time and at the same site.  I suspected they were in reality a single blood draw.  Patient is clinically and systemically well, without evidence of sepsis or systemic toxicity.  I suspect that this is contaminant blood draw and not MRSA bacteremia.  Patient was started on IV vancomycin.  Blood cultures were repeated today at 2 different sites.  For now, will continue IV vancomycin.  If repeat blood cultures have no growth, will likely discontinue antibiotic.  Follow-up repeat blood cultures from this morning.  Continue IV vancomycin for now.  Will discontinue IV vancomycin if repeat blood cultures have no growth.  "

## 2025-01-05 NOTE — ASSESSMENT & PLAN NOTE
Lab Results   Component Value Date    EGFR 20 01/05/2025    EGFR 23 01/04/2025    EGFR 36 01/03/2025    CREATININE 2.75 (H) 01/05/2025    CREATININE 2.45 (H) 01/04/2025    CREATININE 1.73 (H) 01/03/2025   Creatinine continues to rise.  Antibiotic dosages adjusted accordingly.  Monitor creatinine.

## 2025-01-05 NOTE — PROGRESS NOTES
Progress Note - Vascular Surgery   Name: Thomas Horne 79 y.o. male I MRN: [id number]  [facility] Date of Admission: 1/4/2025   Date of Service: 1/5/2025 I Hospital Day: 1     Assessment & Plan  PAD (peripheral artery disease) ([facility])  80 y/o M w/ PAD s/p R CFA-peroneal bypass with reversed CryoVein 11/16/2023, s/p L distal SFA to BK pop bypass with PTFE 8/30/2024 ([place]) presents with nonhealing right hallux wound.    PMHx former smoker with HTN, HLD, CAD s/p CABG, AAA s/p EVAR, DM type II, CHF, pulmonary HTN, CKD, prostate CA s/p prostatectomy with urinary sphincter placement, provoked LLE DVT.    LEAD 12/10-  RIGHT- high grade stenosis vs occlusion distal superficial femoral artery. Posterior tibial and anterior tibial artery occlusive disease. The femoral to popliteal artery bypass graft could not be identified.  FABIOLA 0.47/x/x; PVR/ PPG tracings are dampened.  LEFT- 50-75% stenosis tibioperoneal trunk/distal anastomosis distal superficial femoral- below knee popliteal bypass graft. Peroneal artery occlusive disease.  FABIOLA 0.79/105/51; PVR/ PPG tracings are dampened.    Plan:  -IR consulted for Agram, anticipate at least mid-week  -Continue ASA, Plavix, Statin  -Podiatry consultation for foot wounds   -Nephrology consultation for CKD, renal prep and clearance for Agram  - Remainder of care per primary team    Bacteremia due to Gram-positive bacteria  - Follow up repeat blood cultures from.  - Currently on Ancef    24 Hour Events : No acute events overnight. Afebrile, hemodynamically stable.     Objective :  Temp:  [98.1 °F (36.7 °C)-99 °F (37.2 °C)] 98.1 °F (36.7 °C)  HR:  [64-77] 67  BP: ()/(51-57) 93/52  SpO2:  [93 %-96 %] 93 %  O2 Device: None (Room air)     I/O         01/03 0701 01/04 0700 01/04 0701 01/05 0700 01/05 0701 01/06 0700    Urine   150    Total Output   150    Net   -150                   Physical Exam:  General: No acute distress, alert and oriented  CV: Well perfused,  "regular rate  Lungs: Normal work of breathing, no increased respiratory effort  Abdomen: Soft, non-tender, non-distended.  Extremities: Right first toe wound, left heel wound, monophasic right DP signal, absent right PT signal. Left DP/PT multiphasic.   Skin: Warm, dry, right first toe wound, left heel wound        Lab Results: I have reviewed the following results:  CBC with diff:   Lab Results   Component Value Date    WBC 12.36 (H) 01/05/2025    HGB 12.8 01/05/2025    HCT 39.5 01/05/2025    MCV 84 01/05/2025     01/05/2025    RBC 4.70 01/05/2025    MCH 27.2 01/05/2025    MCHC 32.4 01/05/2025    RDW 14.3 01/05/2025    MPV 11.3 01/05/2025    NRBC 0 01/03/2025   ,   BMP/CMP:  Lab Results   Component Value Date    SODIUM 134 (L) 01/05/2025    K 3.7 01/05/2025    CL 98 01/05/2025    CO2 25 01/05/2025    CO2 25 08/30/2024    BUN 47 (H) 01/05/2025    CREATININE 2.75 (H) 01/05/2025    GLUCOSE 206 (H) 08/30/2024    CALCIUM 8.5 01/05/2025    AST 16 12/19/2024    ALT 6 (L) 12/19/2024    ALKPHOS 41 12/19/2024    EGFR 20 01/05/2025   ,   Lipid Panel: No results found for: \"CHOL\",   Coags:   Lab Results   Component Value Date    PTT 35 (H) 12/12/2024    INR 1.08 12/12/2024   ,   Blood Culture:   Lab Results   Component Value Date    BLOODCX Received in Microbiology Lab. Culture in Progress. 01/04/2025    BLOODCX Received in Microbiology Lab. Culture in Progress. 01/04/2025   ,   Urinalysis:   Lab Results   Component Value Date    COLORU Yellow 12/12/2024    CLARITYU Clear 12/12/2024    SPECGRAV 1.020 12/12/2024    PHUR 5.5 12/12/2024    LEUKOCYTESUR Negative 12/12/2024    NITRITE Negative 12/12/2024    GLUCOSEU 300 (3/10%) (A) 12/12/2024    KETONESU Negative 12/12/2024    BILIRUBINUR Negative 12/12/2024    BLOODU Negative 12/12/2024   ,   Urine Culture: No results found for: \"URINECX\",   Wound Culure:   Lab Results   Component Value Date    WOUNDCULT Culture too young- will reincubate 01/03/2025       Imaging Results " Review: No pertinent imaging studies reviewed.  Other Study Results Review: No additional pertinent studies reviewed.    VTE Prophylaxis: VTE covered by:  heparin (porcine), Subcutaneous, 5,000 Units at 01/05/25 0625

## 2025-01-05 NOTE — ASSESSMENT & PLAN NOTE
Was sent to the ED at [facility] ([facility] on 1/3/25 after wound care noted worsening of his right foot ulcer  History of PAD.    Was seen by vascular surgery in the ED and transferred to [facility] for a gram and possible bypass redo was recommended  Transferred to [facility] on 1/4/2025  Seen by vascular - IR consulted for A-gram next week, renal consulted for IVY on CKD requiring A-gram   Continue ASA, Plavix, statin  Podiatry consult for foot wound  On Cefazolin -continue  Vancomycin has been added due to gram-positive cocci bacteremia -continue  ID consult

## 2025-01-05 NOTE — CONSULTS
Podiatry - Consultation    Patient Information:   Thomas Horne 79 y.o. male MRN: [id number]  Unit/Bed#: [facility] 424-01 Encounter: [id number]  PCP: Frank Lombardi, DO  Date of Admission:  1/4/2025  Date of Consultation: 01/05/25  Requesting Physician: Cynthia Castorena, *      ASSESSMENT:    Thomas Horne is a 79 y.o. male with:    Right non-healing diabetic foot ulceration, Sumner stage 4  L heel healed wound  T2DM with diabetic neuropathy.  PAD, CAD, s/p EVAR, L SFA to BK pop bypass  IVY history  History of DVT    PLAN:    Patient seen evaluated bedside alongside Dr. Rush  Of note right hallux nonhealing wound without signs soft tissue infection.  Wound is stable and showing signs of ischemia.  Plan to continue local wound care pending right lower extremity angiogram for potential vascular surgery.  Bacteremia noted in mst recent blood cultures, however ID believes they were from one single site clood draw and likely contaminant.  Left heel wound has fully healed in the outpatient setting and shows no signs of infection.  R foot xray no osseus change  Reviewed leads. R side ischemic level at FABIOLA and no waveforms at MT and GT.  White count of 12.36 and vitals are stable.  Local wound care consisting of Betadine DSD. Wound care instructions placed.  Elevation on green foam wedges or pillows when non-ambulatory  Rest of care per primary team.  Will discuss this plan with my attending and update as needed.    Weightbearing status: Weightbearing as tolerated    SUBJECTIVE:    History of Present Illness:    Thomas Horne is a 79 y.o. male who is originally admitted 1/4/2025 due to diabetic ulcer of right midfoot associated with diabetes mellitus. Patient has a past medical history of type 2 diabetes mellitus, coronary artery disease, peripheral arterial disease, chronic diastolic congestive heart failure, acute kidney injury, history of DVT.    We are consulted for bilateral foot wounds.  Patient and his daughters were  in the room during our consultation for today.  Patient's daughters provide history as they mentioned that Mr. Horne has had a change in mentation as of recently.  They mentioned that his left heel wound was healed in the outpatient setting.  He had gone to the emergency department for his right toe wound and at that point he was feeling great and was discharged from the emergency department.  They mentioned that his right great toe wound was debrided in the outpatient wound care setting and has been getting dark ever since.  Patient's daughter does relay that the patient has been having chills as of late.    Review of Systems:    Constitutional: Negative.    HENT: Negative.    Eyes: Negative.    Respiratory: Negative.    Cardiovascular: Negative.    Gastrointestinal: Negative.    Musculoskeletal: Negative  Skin: Bilateral foot wounds  Neurological: Negative  Psych: Negative.     Past Medical and Surgical History:     Past Medical History:   Diagnosis Date    Anemia     CAD (coronary artery disease)     Callus     Cancer (HCC)     prostate    CHF (congestive heart failure) (HCC)     Chronic kidney disease     Clotting disorder (HCC)     Coronary artery disease 1988    Deep vein thrombosis (HCC)     Diabetes mellitus (HCC)     Difficulty walking     Duodenal ulcer     Ear problems     Heart disease 1988    HL (hearing loss)     Hyperlipidemia     Hypertension     Myocardial infarction (HCC)     Neuropathy     Bilateral feet    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)     Plantar fasciitis     Sleep apnea     Could not tolerate CPAP       Past Surgical History:   Procedure Laterality Date    ABDOMINAL AORTIC ANEURYSM REPAIR      Stented    ADENOIDECTOMY      BACK SURGERY  1985    CARDIAC CATHETERIZATION Left 10/19/2022    Procedure: Cardiac Left Heart Cath;  Surgeon: Danielle Pereira MD;  Location: [facility];  Service: Cardiology    CARDIAC ELECTROPHYSIOLOGY PROCEDURE Left 12/16/2024    Procedure: Cardiac pacer  implant;  Surgeon: Danielle Pereira MD;  Location: [facility];  Service: Cardiology    CARDIAC SURGERY  2002    3 cardiac bypass then angioplasty 7/2020    CHOLECYSTECTOMY      COLONOSCOPY  02/14/2024    CORONARY ARTERY BYPASS GRAFT      IR LOWER EXTREMITY ANGIOGRAM  11/01/2023    IR LOWER EXTREMITY ANGIOGRAM  08/07/2024    LAMINECTOMY  1990    WI BYPASS W/VEIN FEMORAL-POPLITEAL Right 11/16/2023    Procedure: BYPASS FEMORAL-POPLITEAL WITH CRYO VEIN, RIGHT FEMORAL ENDARTERECTOMY;  Surgeon: Vasquez Clark MD;  Location: [facility];  Service: Vascular    WI BYPASS W/VEIN FEMORAL-POPLITEAL Left 8/30/2024    Procedure: left lower extremity above knee popliteal to below knee popliteal artery bypass with PTFE graft;  Surgeon: Vasquez Clark MD;  Location: [facility];  Service: Vascular    WI SLCTV CATHJ 3RD+ ORD SLCTV ABDL PEL/LXTR BRNCH Right 11/01/2023    Procedure: ARTERIOGRAM Right lower extremity arteriogram with CO2 via right groin access;  Surgeon: Vasquez Clark MD;  Location: [facility];  Service: Vascular    WI SLCTV CATHJ 3RD+ ORD SLCTV ABDL PEL/LXTR BRNCH Left 08/07/2024    Procedure: diagnostic LLE Arteriogram;  Surgeon: Vasquez Clark MD;  Location: [facility];  Service: Vascular    PROSTATE SURGERY      TONSILLECTOMY      URINARY SPHINCTER IMPLANT         Meds/Allergies:      Medications Prior to Admission:     acetaminophen (TYLENOL) 325 mg tablet    amLODIPine (NORVASC) 2.5 mg tablet    aspirin 81 mg chewable tablet    atorvastatin (LIPITOR) 40 mg tablet    Blood Glucose Monitoring Suppl (OneTouch Verio Reflect) w/Device KIT    clopidogrel (PLAVIX) 75 mg tablet    Droplet Pen Needles 32G X 4 MM MISC    DULoxetine (CYMBALTA) 30 mg delayed release capsule    Empagliflozin (Jardiance) 25 MG TABS    fenofibrate 160 MG tablet    insulin glargine (LANTUS) 100 units/mL subcutaneous injection    isosorbide mononitrate (IMDUR) 30 mg 24 hr tablet     losartan (COZAAR) 25 mg tablet    metFORMIN (GLUCOPHAGE) 500 mg tablet    metoprolol succinate (TOPROL-XL) 100 mg 24 hr tablet    Multiple Vitamins-Minerals (MULTIVITAMIN MEN 50+ PO)    nitroglycerin (NITROSTAT) 0.4 mg SL tablet    Omega-3 Fatty Acids (fish oil) 1,000 mg    OneTouch Delica Lancets 33G MISC    oxyCODONE-acetaminophen (PERCOCET) 5-325 mg per tablet    pantoprazole (PROTONIX) 40 mg tablet    pentoxifylline (TRENtal) 400 mg ER tablet    pregabalin (LYRICA) 150 mg capsule    ranolazine (RANEXA) 1000 MG SR tablet    sitaGLIPtin (JANUVIA) 25 mg tablet    sodium hypochlorite (DAKIN'S HALF-STRENGTH) external solution    torsemide (DEMADEX) 20 mg tablet    Allergies   Allergen Reactions    Lisinopril Rash and Lip Swelling       Social History:     Marital Status:     Substance Use History:   Social History     Substance and Sexual Activity   Alcohol Use Yes    Alcohol/week: 14.0 standard drinks of alcohol    Types: 14 Cans of beer per week    Comment: stopped last few months     Social History     Tobacco Use   Smoking Status Former    Current packs/day: 0.00    Average packs/day: 1.5 packs/day for 35.0 years (52.5 ttl pk-yrs)    Types: Cigarettes    Start date: 1956    Quit date:     Years since quittin.0    Passive exposure: Past   Smokeless Tobacco Never     Social History     Substance and Sexual Activity   Drug Use Never       Family History:    Family History   Problem Relation Age of Onset    Diabetes Mother     Alcohol abuse Father     Heart disease Brother     Cancer Sister         Thyroid    Cancer Sister         Colon    Cancer Brother         Throat    Thyroid disease Brother     Cancer Brother     Diabetes Sister     Thyroid disease Sister     Mental illness Neg Hx          OBJECTIVE:    Vitals:   Blood Pressure: 93/52 (25 0816)  Pulse: 82 (25 1317)  Temperature: 99.3 °F (37.4 °C) (25 1317)  Temp Source: Oral (25 0816)  SpO2: (!) 88 % (25  1317)    Physical Exam:    General Appearance: Alert, cooperative, no distress.  HEENT: Head normocephalic, atraumatic, without obvious abnormality.  Heart: Normal rate and rhythm.  Lungs: Non-labored breathing. No respiratory distress.  Abdomen: Without distension.  Psychiatric: AAOx3  Lower Extremity:  Vascular:   Right DP and PT pulses are absent. Left DP and PT pulses are weak. CRT < 3 seconds at the digits. + 2/4 edema noted at bilateral lower extremities. Pedal hair is absent. Skin temperature is WNL bilaterally.    Musculoskeletal:  MMT is 4+/5 in all muscle compartments bilaterally. ROM at the 1st MPJ and ankle joint are reduced bilaterally with the leg extended.     Dermatological:  Lower extremity wound(s) as noted below:    Wound #: 1  Location: Right great toe  Length 3 cm: Width 3.5 cm: Depth 0.2 cm:   Deepest Tissue Noted in Base: Subcutaneous  Probe to Bone: No  Granulation: 20% Fibrotic Tissue: 30% Necrotic Tissue: 50%   Drainage Amount: None  Signs of Infection: No      Neurological:  Gross sensation is diminished. Protective sensation is diminished. Patient Reports numbness and/or paresthesias.    Clinical Images 01/05/25:      Additional data:     Lab Results: I have personally reviewed pertinent labs including:    Results from last 7 days   Lab Units 01/05/25  0647 01/04/25  0651 01/03/25  1459   WBC Thousand/uL 12.36*   < > 10.06   HEMOGLOBIN g/dL 12.8   < > 14.3   HEMATOCRIT % 39.5   < > 45.3   PLATELETS Thousands/uL 182   < > 307   SEGS PCT %  --   --  78*   LYMPHO PCT % 0*  --  14   MONO PCT % 0*  --  6   EOS PCT % 2  --  1    < > = values in this interval not displayed.     Results from last 7 days   Lab Units 01/05/25  0647   POTASSIUM mmol/L 3.7   CHLORIDE mmol/L 98   CO2 mmol/L 25   BUN mg/dL 47*   CREATININE mg/dL 2.75*   CALCIUM mg/dL 8.5           Cultures: I have personally reviewed pertinent cultures including:    Results from last 7 days   Lab Units 01/04/25  2217 01/03/25 2035  "01/03/25  1513   BLOOD CULTURE  Received in Microbiology Lab. Culture in Progress.  Received in Microbiology Lab. Culture in Progress. Alpha Hemolytic Streptococcus*  Alpha Hemolytic Streptococcus*  Staphylococcus epidermidis*  --    GRAM STAIN RESULT   --  Gram positive cocci in chains*  Gram positive cocci in chains and clusters* No Polys or Bacteria seen   WOUND CULTURE   --   --  1+ Growth of Staphylococcus aureus*               ** Please Note: Portions of the record may have been created with voice recognition software. Occasional wrong word or \"sound a like\" substitutions may have occurred due to the inherent limitations of voice recognition software. Read the chart carefully and recognize, using context, where substitutions have occurred. **    "

## 2025-01-05 NOTE — ASSESSMENT & PLAN NOTE
Patient has extensive and severe PAD, status post revascularization.  Vascular surgery evaluation noted, with plan for arteriogram noted.  Follow-up arteriogram findings.  Vascular surgery follow-up.

## 2025-01-05 NOTE — ASSESSMENT & PLAN NOTE
If patient does indeed have true bacteremia, there would be some concern for PPM infection.  However, at present, patient does not appear to have an active infection or bacteremia, as in above.  Follow-up repeat blood cultures.

## 2025-01-05 NOTE — ASSESSMENT & PLAN NOTE
Family noting mild confusion 1/5/2025, no focal neurodeficits on exam  Likely multifactorial with component of hospital delirium  Delirium precautions, supportive cares, frequent reorientation

## 2025-01-05 NOTE — ASSESSMENT & PLAN NOTE
Home Amlodipine and Metoprolol continued  Losartan and Torsemide held in the setting of acute kidney injury  Monitor BP

## 2025-01-05 NOTE — ASSESSMENT & PLAN NOTE
Lab Results   Component Value Date    HGBA1C 6.2 (H) 10/14/2024       Recent Labs     01/04/25  1053 01/04/25  1617 01/04/25  2121 01/05/25  0738   POCGLU 211* 200* 181* 250*       Blood Sugar Average: Last 72 hrs:  (P) 215.5  This is risk factor for poor wound healing.  Management per primary service.    Prior records reviewed in detail.  Discussed with patient and his daughters in detail regarding the above plan.

## 2025-01-05 NOTE — ASSESSMENT & PLAN NOTE
Follow-up with cardiology  Most recent echo from 12/13/2024 moderate to severe aortic stenosis, EF of 50%  Hold torsemide and Jardiance for now give gentle hydration

## 2025-01-05 NOTE — ASSESSMENT & PLAN NOTE
Patient was sent to the ED at [facility] on 1/3/25 after wound care noted worsening of his right foot ulcer; note underlying history of PAD. Evaluated by vascular surgery in the ED and transferred to [facility] for angiogram and possible bypass redo.  Vascular surgery following, appreciate ongoing recommendations  IR consulted for A-gram, timing TBD but likely later this week  Podiatry following, continue local wound care   ID following, continuing vancomycin for now  Continue ASA, Plavix, statin  Analgesics as needed  PT/OT evaluations

## 2025-01-05 NOTE — PLAN OF CARE
Problem: PAIN - ADULT  Goal: Verbalizes/displays adequate comfort level or baseline comfort level  Description: Interventions:  - Encourage patient to monitor pain and request assistance  - Assess pain using appropriate pain scale  - Administer analgesics based on type and severity of pain and evaluate response  - Implement non-pharmacological measures as appropriate and evaluate response  - Consider cultural and social influences on pain and pain management  - Notify physician/advanced practitioner if interventions unsuccessful or patient reports new pain  Outcome: Progressing     Problem: INFECTION - ADULT  Goal: Absence or prevention of progression during hospitalization  Description: INTERVENTIONS:  - Assess and monitor for signs and symptoms of infection  - Monitor lab/diagnostic results  - Monitor all insertion sites, i.e. indwelling lines, tubes, and drains  - Monitor endotracheal if appropriate and nasal secretions for changes in amount and color  - [place] appropriate cooling/warming therapies per order  - Administer medications as ordered  - Instruct and encourage patient and family to use good hand hygiene technique  - Identify and instruct in appropriate isolation precautions for identified infection/condition  Outcome: Progressing     Problem: SAFETY ADULT  Goal: Patient will remain free of falls  Description: INTERVENTIONS:  - Educate patient/family on patient safety including physical limitations  - Instruct patient to call for assistance with activity   - Consult OT/PT to assist with strengthening/mobility   - Keep Call bell within reach  - Keep bed low and locked with side rails adjusted as appropriate  - Keep care items and personal belongings within reach  - Initiate and maintain comfort rounds  - Make Fall Risk Sign visible to staff  - Offer Toileting every 2 Hours, in advance of need  - Initiate/Maintain bed alarm  - Obtain necessary fall risk management equipment: nonskid socks  - Apply  yellow socks and bracelet for high fall risk patients  - Consider moving patient to room near nurses station  Outcome: Progressing     Problem: SKIN/TISSUE INTEGRITY - ADULT  Goal: Incision(s), wounds(s) or drain site(s) healing without S/S of infection  Description: INTERVENTIONS  - Assess and document dressing, incision, wound bed, drain sites and surrounding tissue  - Provide patient and family education  - Perform skin care/dressing changes every day  Outcome: Progressing

## 2025-01-05 NOTE — CONSULTS
NEPHROLOGY HOSPITAL CONSULTATION   Thomas Horne 79 y.o. male MRN: [id number]  Unit/Bed#: [facility] 424-01 Encounter: [id number]    Brief History of Admission - 79 y.o. male with history of multiple comorbidities including peripheral arterial disease, CKD stage IIIb, CHF, CAD, diabetes presented with worsening appearance of right great toe wound to outside facility on 1/3, subsequently transferred over in need for arteriogram and vascular surgery evaluation and management.  Nephrology consulted for evaluation and management of acute kidney injury as well as clearance for arteriogram.    Assessment & Plan  Acute kidney injury superimposed on stage 3b chronic kidney disease (HCC)  Lab Results   Component Value Date    EGFR 20 01/05/2025    EGFR 23 01/04/2025    EGFR 36 01/03/2025    CREATININE 2.75 (H) 01/05/2025    CREATININE 2.45 (H) 01/04/2025    CREATININE 1.73 (H) 01/03/2025   IVY most likely secondary to ischemic injury secondary to hypotension plus prerenal azotemia in the presence of Jardiance and torsemide plus failure to autoregulate and in the presence of losartan some component of IC GN in light of bacteremia.  After review of records it appears that the patient has a baseline Creatinine of 1.6-1.8 mg/dL.  Admission creatinine of 1.73 mg/dL at outside [facility] and then 2.45 mg/dL on 1/4 at [place].  Creatinine today is at 2.75 mg/dL unfortunately continues to deteriorate.  Placed on Plasma-Lyte 75 cc an hour for now likely DC in a.m.  From a renal standpoint patient is not stable for arteriogram at this time.  Discussed risk for IVY secondary to SANJUANA and severity of IVY needing RRT in depth with the patient.  At this time would like to hold arteriogram unless emergent until renal parameters are stable and improving and closer to baseline.  check BMP, magnesium, phosphorus in a.m.  Await renal recovery.  Place on a renal diet when allowed diet order.   Strict I/O.  Daily weights  Urinary retention protocol  Avoid  nephrotoxins, adjust meds to appropriate GFR.  Likely has underlying CKD secondary to diabetic kidney disease hypertension nephrosclerosis plus some component of renovascular disease  Outpatient for nephrology follows up with Dr. Fischer  Primary hypertension  Optimize hemodynamic status to avoid renal ischemic injury.  Maintain MAP > 65mmHg  Avoid BP fluctuations.  Home medications: Torsemide 20 mg p.o. daily, Norvasc 2.5 mg p.o. daily, Jardiance 25 mg p.o. daily, losartan 25 mg p.o. daily, Toprol- mg p.o. daily, Imdur 90 mg p.o. daily,   Current medications: Norvasc 2.5 mg p.o. daily, Imdur 90 mg p.o. daily, Toprol- mg p.o. daily  Hold home torsemide, losartan and Jardiance for now  Adjust holding parameters for current meds to hold for SBP less than 130  Diabetic ulcer of right midfoot associated with diabetes mellitus due to underlying condition, limited to breakdown of skin ([facility])  Lab Results   Component Value Date    HGBA1C 6.2 (H) 10/14/2024       Recent Labs     01/04/25  1053 01/04/25  1617 01/04/25 2121 01/05/25  0738   POCGLU 211* 200* 181* 250*       Blood Sugar Average: Last 72 hrs:  (P) 215.5    Coronary artery disease involving native coronary artery of native heart without angina pectoris  Follow-up with cardiology  Most recent echo from 12/13/2024 moderate to severe aortic stenosis, EF of 50%  Hold torsemide and Jardiance for now give gentle hydration  Type 2 diabetes mellitus with diabetic polyneuropathy, with long-term current use of insulin ([facility])  Lab Results   Component Value Date    HGBA1C 6.2 (H) 10/14/2024       Recent Labs     01/04/25  1053 01/04/25  1617 01/04/25  2121 01/05/25  0738   POCGLU 211* 200* 181* 250*       Blood Sugar Average: Last 72 hrs:  (P) 215.5  Optimize glycemic control  PAD (peripheral artery disease) ([facility])  Follow-up with vascular  Hold off on arteriogram at this time unless emergent see above  Staphylococcus epidermidis bacteremia  Follow-up with ID  Hold  vancomycin if level greater than 20  Blood cultures from 1/3 positive for staph epi    I have reviewed the nephrology recommendations including hold Jardiance and torsemide gentle IV hydration not cleared for angiogram at this time unless emergent, with medicine team, and we are in agreement with renal plan including the information outlined above.    HISTORY OF PRESENT ILLNESS:  Requesting Physician: Cynthia Castorena, *  Reason for Consult: christel and periop    Thomas Horne is a 79 y.o. male with history of multiple comorbidities including peripheral arterial disease, CKD stage IIIb, CHF, CAD, diabetes presented with worsening appearance of right great toe wound to outside facility on 1/3, subsequently transferred over in need for arteriogram and vascular surgery evaluation and management.  Nephrology consulted for evaluation and management of acute kidney injury as well as clearance for arteriogram.  Review of record shows patient has a baseline creatinine 1.6 to 1.8 mg/dL follows up with Dr. Fischer for nephrology in the office.  Patient seen and examined in his room earlier.  Admitted with creatinine 1.73 on 1/3 was up to 2.45 mg/dL yesterday and up to 2.75 mg/dL today.  Was on Jardiance and torsemide.  Currently feels well has no complaints denies NSAID usage    PAST MEDICAL HISTORY:  Past Medical History:   Diagnosis Date    Anemia     CAD (coronary artery disease)     Callus     Cancer (HCC)     prostate    CHF (congestive heart failure) (HCC)     Chronic kidney disease     Clotting disorder (HCC)     Coronary artery disease 1988    Deep vein thrombosis (HCC)     Diabetes mellitus (HCC)     Difficulty walking     Duodenal ulcer     Ear problems     Heart disease 1988    HL (hearing loss)     Hyperlipidemia     Hypertension     Myocardial infarction (HCC)     Neuropathy     Bilateral feet    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)     Plantar fasciitis     Sleep apnea     Could not tolerate CPAP       PAST SURGICAL  HISTORY:  Past Surgical History:   Procedure Laterality Date    ABDOMINAL AORTIC ANEURYSM REPAIR      Stented    ADENOIDECTOMY      BACK SURGERY  1985    CARDIAC CATHETERIZATION Left 10/19/2022    Procedure: Cardiac Left Heart Cath;  Surgeon: Danielle Pereira MD;  Location: [facility];  Service: Cardiology    CARDIAC ELECTROPHYSIOLOGY PROCEDURE Left 12/16/2024    Procedure: Cardiac pacer implant;  Surgeon: Danielle Pereira MD;  Location: [facility];  Service: Cardiology    CARDIAC SURGERY  2002    3 cardiac bypass then angioplasty 7/2020    CHOLECYSTECTOMY      COLONOSCOPY  02/14/2024    CORONARY ARTERY BYPASS GRAFT      IR LOWER EXTREMITY ANGIOGRAM  11/01/2023    IR LOWER EXTREMITY ANGIOGRAM  08/07/2024    LAMINECTOMY  1990    MO BYPASS W/VEIN FEMORAL-POPLITEAL Right 11/16/2023    Procedure: BYPASS FEMORAL-POPLITEAL WITH CRYO VEIN, RIGHT FEMORAL ENDARTERECTOMY;  Surgeon: Vasquez Clark MD;  Location: [facility];  Service: Vascular    MO BYPASS W/VEIN FEMORAL-POPLITEAL Left 8/30/2024    Procedure: left lower extremity above knee popliteal to below knee popliteal artery bypass with PTFE graft;  Surgeon: Vasquez Clark MD;  Location: [facility];  Service: Vascular    MO SLCTV CATHJ 3RD+ ORD SLCTV ABDL PEL/LXTR BRNC Right 11/01/2023    Procedure: ARTERIOGRAM Right lower extremity arteriogram with CO2 via right groin access;  Surgeon: Vasquez Clark MD;  Location: [facility];  Service: Vascular    MO SLCTV CATHJ 3RD+ ORD SLCTV ABDL PEL/LXTR BRNCH Left 08/07/2024    Procedure: diagnostic LLE Arteriogram;  Surgeon: Vasquez Clark MD;  Location: [facility];  Service: Vascular    PROSTATE SURGERY      TONSILLECTOMY      URINARY SPHINCTER IMPLANT         ALLERGIES:  Allergies   Allergen Reactions    Lisinopril Rash and Lip Swelling       SOCIAL HISTORY:  Social History     Substance and Sexual Activity   Alcohol Use Yes    Alcohol/week: 14.0 standard  drinks of alcohol    Types: 14 Cans of beer per week    Comment: stopped last few months     Social History     Substance and Sexual Activity   Drug Use Never     Social History     Tobacco Use   Smoking Status Former    Current packs/day: 0.00    Average packs/day: 1.5 packs/day for 35.0 years (52.5 ttl pk-yrs)    Types: Cigarettes    Start date: 1956    Quit date:     Years since quittin.0    Passive exposure: Past   Smokeless Tobacco Never       FAMILY HISTORY:  Family History   Problem Relation Age of Onset    Diabetes Mother     Alcohol abuse Father     Heart disease Brother     Cancer Sister         Thyroid    Cancer Sister         Colon    Cancer Brother         Throat    Thyroid disease Brother     Cancer Brother     Diabetes Sister     Thyroid disease Sister     Mental illness Neg Hx        MEDICATIONS:    Current Facility-Administered Medications:     amLODIPine (NORVASC) tablet 2.5 mg, 2.5 mg, Oral, Daily, Felecia Louie MD    atorvastatin (LIPITOR) tablet 40 mg, 40 mg, Oral, HS, Felecia Louie MD, 40 mg at 25 2220    clopidogrel (PLAVIX) tablet 75 mg, 75 mg, Oral, Daily, Felecia Louie MD, 75 mg at 25 0817    DULoxetine (CYMBALTA) delayed release capsule 30 mg, 30 mg, Oral, Daily, Felecia Louie MD, 30 mg at 25 0817    fenofibrate (TRICOR) tablet 145 mg, 145 mg, Oral, Daily, Felecia Louie MD, 145 mg at 25 0814    fish oil capsule 1,000 mg, 1,000 mg, Oral, BID, Felecia Louie MD, 1,000 mg at 25 0818    heparin (porcine) subcutaneous injection 5,000 Units, 5,000 Units, Subcutaneous, Q8H CONSTANTINE, Felecia Louie MD, 5,000 Units at 25 0649    HYDROmorphone (DILAUDID) injection 0.5 mg, 0.5 mg, Intravenous, Q4H PRN, Felecia Louie MD    insulin glargine (LANTUS) subcutaneous injection 15 Units 0.15 mL, 15 Units, Subcutaneous, HS, Felecia Louie MD, 15 Units at 25 2219    insulin lispro (HumALOG/ADMELOG) 100 units/mL subcutaneous  injection 1-5 Units, 1-5 Units, Subcutaneous, TID AC, 2 Units at 01/05/25 0823 **AND** Fingerstick Glucose (POCT), , , TID AC, Felecia Louie MD    insulin lispro (HumALOG/ADMELOG) 100 units/mL subcutaneous injection 1-5 Units, 1-5 Units, Subcutaneous, HS, Felecia Louie MD, 1 Units at 01/04/25 2219    isosorbide mononitrate (IMDUR) 24 hr tablet 90 mg, 90 mg, Oral, Daily, Felecia Louie MD    metoprolol succinate (TOPROL-XL) 24 hr tablet 100 mg, 100 mg, Oral, Daily, Felecia Louie MD    multivitamin-minerals (CENTRUM) tablet 1 tablet, 1 tablet, Oral, Daily, Felecia Louie MD, 1 tablet at 01/05/25 0816    nitroglycerin (NITROSTAT) SL tablet 0.4 mg, 0.4 mg, Sublingual, Q5 Min PRN, Felecia Louie MD    oxyCODONE (ROXICODONE) IR tablet 5 mg, 5 mg, Oral, Q12H PRN, Felecia Louie MD    pantoprazole (PROTONIX) EC tablet 40 mg, 40 mg, Oral, Daily Before Breakfast, Felecia Louie MD, 40 mg at 01/05/25 0754    pentoxifylline (TRENtal) ER tablet 400 mg, 400 mg, Oral, TID With Meals, Felecia Louie MD, 400 mg at 01/05/25 0755    pregabalin (LYRICA) capsule 150 mg, 150 mg, Oral, TID, Felecia Louie MD, 150 mg at 01/05/25 0818    ranolazine (RANEXA) 12 hr tablet 1,000 mg, 1,000 mg, Oral, BID, Felecia Louie MD, 1,000 mg at 01/05/25 0817    sodium chloride 0.9 % infusion, 50 mL/hr, Intravenous, Continuous, Felecia Louie MD, Last Rate: 50 mL/hr at 01/04/25 2206, 50 mL/hr at 01/04/25 2206    [Held by provider] torsemide (DEMADEX) tablet 20 mg, 20 mg, Oral, Daily, Felecia Louie MD    vancomycin (VANCOCIN) 1500 mg in sodium chloride 0.9% 250 mL IVPB, 15 mg/kg, Intravenous, Daily PRN, Felecia Louie MD      PHYSICAL EXAM:  Current Weight:    First Weight:    Vitals:    01/04/25 2042 01/04/25 2308 01/05/25 0816   BP: 108/57 98/51 93/52   Pulse: 77 69 67   Temp: 98.7 °F (37.1 °C) 98.1 °F (36.7 °C)    TempSrc: Oral     SpO2: 93% 93%        Intake/Output Summary (Last 24 hours) at 1/5/2025  1148  Last data filed at 1/5/2025 0801  Gross per 24 hour   Intake --   Output 150 ml   Net -150 ml     Physical Exam  Vitals and nursing note reviewed.   Constitutional:       General: He is not in acute distress.     Appearance: Normal appearance. He is normal weight. He is not ill-appearing, toxic-appearing or diaphoretic.   HENT:      Head: Normocephalic and atraumatic.      Mouth/Throat:      Mouth: Mucous membranes are dry.      Pharynx: No oropharyngeal exudate.   Eyes:      General: No scleral icterus.  Cardiovascular:      Rate and Rhythm: Normal rate.   Pulmonary:      Effort: No respiratory distress.      Breath sounds: No stridor. No wheezing.   Abdominal:      General: There is no distension.      Palpations: Abdomen is soft.      Tenderness: There is no abdominal tenderness.   Musculoskeletal:         General: No swelling.      Cervical back: Normal range of motion. No rigidity.   Skin:     General: Skin is dry.      Coloration: Skin is not jaundiced.   Neurological:      General: No focal deficit present.      Mental Status: He is alert and oriented to person, place, and time.   Psychiatric:         Mood and Affect: Mood normal.         Behavior: Behavior normal.       ROS:  Constitutional:  No chills, no fever.   HENT:  No sore throat  Respiratory:  No cough, no hemoptysis, no wheezing.    Cardiovascular:  no leg swelling.   Gastrointestinal:  No constipation, no diarrhea.   Genitourinary:  No dysuria and hematuria. No decreased urine output.  Musculoskeletal:  No back pain.   Neurological:  no dizziness, No headaches.   Psychiatric/Behavioral:  No agitation, no confusion.    Wounds: positive,  All other systems reviewed and are negative.          Invasive Devices:      Lab Results:   Results from last 7 days   Lab Units 01/05/25  0647 01/04/25  0651 01/03/25  1459   WBC Thousand/uL 12.36* 13.98* 10.06   HEMOGLOBIN g/dL 12.8 11.9* 14.3   HEMATOCRIT % 39.5 36.4* 45.3   PLATELETS Thousands/uL 182 224  "307   POTASSIUM mmol/L 3.7 3.8 4.5   CHLORIDE mmol/L 98 100 100   CO2 mmol/L 25 28 30   BUN mg/dL 47* 42* 34*   CREATININE mg/dL 2.75* 2.45* 1.73*   CALCIUM mg/dL 8.5 8.5 9.9   MAGNESIUM mg/dL  --  1.5*  --      Other Studies: Foot x-ray not suggestive of osteomyelitis at this time    Portions of the record may have been created with voice recognition software. Occasional wrong word or \"sound a like\" substitutions may have occurred due to the inherent limitations of voice recognition software. Read the chart carefully and recognize, using context, where substitutions have occurred.If you have any questions, please contact the dictating provider.    "

## 2025-01-05 NOTE — PLAN OF CARE
Problem: PAIN - ADULT  Goal: Verbalizes/displays adequate comfort level or baseline comfort level  Description: Interventions:  - Encourage patient to monitor pain and request assistance  - Assess pain using appropriate pain scale  - Administer analgesics based on type and severity of pain and evaluate response  - Implement non-pharmacological measures as appropriate and evaluate response  - Consider cultural and social influences on pain and pain management  - Notify physician/advanced practitioner if interventions unsuccessful or patient reports new pain  Outcome: Progressing     Problem: INFECTION - ADULT  Goal: Absence or prevention of progression during hospitalization  Description: INTERVENTIONS:  - Assess and monitor for signs and symptoms of infection  - Monitor lab/diagnostic results  - Monitor all insertion sites, i.e. indwelling lines, tubes, and drains  - Monitor endotracheal if appropriate and nasal secretions for changes in amount and color  - [place] appropriate cooling/warming therapies per order  - Administer medications as ordered  - Instruct and encourage patient and family to use good hand hygiene technique  - Identify and instruct in appropriate isolation precautions for identified infection/condition  Outcome: Progressing  Goal: Absence of fever/infection during neutropenic period  Description: INTERVENTIONS:  - Monitor WBC    Outcome: Progressing     Problem: SAFETY ADULT  Goal: Patient will remain free of falls  Description: INTERVENTIONS:  - Educate patient/family on patient safety including physical limitations  - Instruct patient to call for assistance with activity   - Consult OT/PT to assist with strengthening/mobility   - Keep Call bell within reach  - Keep bed low and locked with side rails adjusted as appropriate  - Keep care items and personal belongings within reach  - Initiate and maintain comfort rounds  - Make Fall Risk Sign visible to staff  - Offer Toileting every  Hours,  in advance of need  - Initiate/Maintain alarm  - Obtain necessary fall risk management equipment:   - Apply yellow socks and bracelet for high fall risk patients  - Consider moving patient to room near nurses station  Outcome: Progressing  Goal: Maintain or return to baseline ADL function  Description: INTERVENTIONS:  -  Assess patient's ability to carry out ADLs; assess patient's baseline for ADL function and identify physical deficits which impact ability to perform ADLs (bathing, care of mouth/teeth, toileting, grooming, dressing, etc.)  - Assess/evaluate cause of self-care deficits   - Assess range of motion  - Assess patient's mobility; develop plan if impaired  - Assess patient's need for assistive devices and provide as appropriate  - Encourage maximum independence but intervene and supervise when necessary  - Involve family in performance of ADLs  - Assess for home care needs following discharge   - Consider OT consult to assist with ADL evaluation and planning for discharge  - Provide patient education as appropriate  Outcome: Progressing  Goal: Maintains/Returns to pre admission functional level  Description: INTERVENTIONS:  - Perform AM-PAC 6 Click Basic Mobility/ Daily Activity assessment daily.  - Set and communicate daily mobility goal to care team and patient/family/caregiver.   - Collaborate with rehabilitation services on mobility goals if consulted  - Perform Range of Motion  times a day.  - Reposition patient every  hours.  - Dangle patient  times a day  - Stand patient  times a day  - Ambulate patient  times a day  - Out of bed to chair  times a day   - Out of bed for meals  times a day  - Out of bed for toileting  - Record patient progress and toleration of activity level   Outcome: Progressing     Problem: DISCHARGE PLANNING  Goal: Discharge to home or other facility with appropriate resources  Description: INTERVENTIONS:  - Identify barriers to discharge w/patient and caregiver  - Arrange for  needed discharge resources and transportation as appropriate  - Identify discharge learning needs (meds, wound care, etc.)  - Arrange for interpretive services to assist at discharge as needed  - Refer to Case Management Department for coordinating discharge planning if the patient needs post-hospital services based on physician/advanced practitioner order or complex needs related to functional status, cognitive ability, or social support system  Outcome: Progressing     Problem: Knowledge Deficit  Goal: Patient/family/caregiver demonstrates understanding of disease process, treatment plan, medications, and discharge instructions  Description: Complete learning assessment and assess knowledge base.  Interventions:  - Provide teaching at level of understanding  - Provide teaching via preferred learning methods  Outcome: Progressing     Problem: SKIN/TISSUE INTEGRITY - ADULT  Goal: Skin Integrity remains intact(Skin Breakdown Prevention)  Description: Assess:  -Perform Dev assessment every   -Clean and moisturize skin every   -Inspect skin when repositioning, toileting, and assisting with ADLS  -Assess under medical devices such as  every   -Assess extremities for adequate circulation and sensation     Bed Management:  -Have minimal linens on bed & keep smooth, unwrinkled  -Change linens as needed when moist or perspiring  -Avoid sitting or lying in one position for more than  hours while in bed  -Keep HOB at degrees     Toileting:  -Offer bedside commode  -Assess for incontinence every   -Use incontinent care products after each incontinent episode such as     Activity:  -Mobilize patient  times a day  -Encourage activity and walks on unit  -Encourage or provide ROM exercises   -Turn and reposition patient every  Hours  -Use appropriate equipment to lift or move patient in bed  -Instruct/ Assist with weight shifting every  when out of bed in chair  -Consider limitation of chair time  hour intervals    Skin  Care:  -Avoid use of baby powder, tape, friction and shearing, hot water or constrictive clothing  -Relieve pressure over bony prominences using   -Do not massage red bony areas    Next Steps:  -Teach patient strategies to minimize risks such as    -Consider consults to  interdisciplinary teams such as   Outcome: Progressing  Goal: Incision(s), wounds(s) or drain site(s) healing without S/S of infection  Description: INTERVENTIONS  - Assess and document dressing, incision, wound bed, drain sites and surrounding tissue  - Provide patient and family education  - Perform skin care/dressing changes every   Outcome: Progressing  Goal: Pressure injury heals and does not worsen  Description: Interventions:  - Implement low air loss mattress or specialty surface (Criteria met)  - Apply silicone foam dressing  - Instruct/assist with weight shifting every  minutes when in chair   - Limit chair time to  hour intervals  - Use special pressure reducing interventions such as  when in chair   - Apply fecal or urinary incontinence containment device   - Perform passive or active ROM every   - Turn and reposition patient & offload bony prominences every  hours   - Utilize friction reducing device or surface for transfers   - Consider consults to  interdisciplinary teams such as   - Use incontinent care products after each incontinent episode such as   - Consider nutrition services referral as needed  Outcome: Progressing

## 2025-01-05 NOTE — ASSESSMENT & PLAN NOTE
80 y/o M w/ PAD s/p R CFA-peroneal bypass with reversed CryoVein 11/16/2023, s/p L distal SFA to BK pop bypass with PTFE 8/30/2024 ([place]) presents with nonhealing right hallux wound.    PMHx former smoker with HTN, HLD, CAD s/p CABG, AAA s/p EVAR, DM type II, CHF, pulmonary HTN, CKD, prostate CA s/p prostatectomy with urinary sphincter placement, provoked LLE DVT.    LEAD 12/10-  RIGHT- high grade stenosis vs occlusion distal superficial femoral artery. Posterior tibial and anterior tibial artery occlusive disease. The femoral to popliteal artery bypass graft could not be identified.  FABIOLA 0.47/x/x; PVR/ PPG tracings are dampened.  LEFT- 50-75% stenosis tibioperoneal trunk/distal anastomosis distal superficial femoral- below knee popliteal bypass graft. Peroneal artery occlusive disease.  FABIOLA 0.79/105/51; PVR/ PPG tracings are dampened.    Plan:  -Patient transferred for RLE diagnostic Arteriogram, vascular evaluation w/ possible planning for redo bypass  -IR consult for Agram next week, anticipate at least mid-week, discussed w/ patient, will update with scheduling   -Continue ASA, Plavix, Statin  -Podiatry consultation for Foot wounds   -Nephrology consultation for CKD, renal prep and clearance for Agram

## 2025-01-05 NOTE — ASSESSMENT & PLAN NOTE
Lab Results   Component Value Date    EGFR 20 01/05/2025    EGFR 23 01/04/2025    EGFR 36 01/03/2025    CREATININE 2.75 (H) 01/05/2025    CREATININE 2.45 (H) 01/04/2025    CREATININE 1.73 (H) 01/03/2025   IVY most likely secondary to ischemic injury secondary to hypotension plus prerenal azotemia in the presence of Jardiance and torsemide plus failure to autoregulate and in the presence of losartan some component of IC GN in light of bacteremia.  After review of records it appears that the patient has a baseline Creatinine of 1.6-1.8 mg/dL.  Admission creatinine of 1.73 mg/dL at outside [facility] and then 2.45 mg/dL on 1/4 at [facility].  Creatinine today is at 2.75 mg/dL unfortunately continues to deteriorate.  Placed on Plasma-Lyte 75 cc an hour for now likely DC in a.m.  From a renal standpoint patient is not stable for arteriogram at this time.  Discussed risk for IVY secondary to SANJUANA and severity of IVY needing RRT in depth with the patient.  At this time would like to hold arteriogram unless emergent until renal parameters are stable and improving and closer to baseline.  check BMP, magnesium, phosphorus in a.m.  Await renal recovery.  Place on a renal diet when allowed diet order.   Strict I/O.  Daily weights  Urinary retention protocol  Avoid nephrotoxins, adjust meds to appropriate GFR.  Likely has underlying CKD secondary to diabetic kidney disease hypertension nephrosclerosis plus some component of renovascular disease  Outpatient for nephrology follows up with Dr. Fischer

## 2025-01-05 NOTE — ASSESSMENT & PLAN NOTE
Wt Readings from Last 3 Encounters:   01/03/25 99.9 kg (220 lb 4 oz)   12/23/24 104 kg (230 lb)   12/23/24 104 kg (229 lb)

## 2025-01-05 NOTE — ASSESSMENT & PLAN NOTE
2 of 2 blood cultures from 1/3/2025 positive for gram-positive cocci  Continue Vancomycin  Repeat blood cultures  ID consult

## 2025-01-05 NOTE — ASSESSMENT & PLAN NOTE
Lab Results   Component Value Date    HGBA1C 6.2 (H) 10/14/2024     Recent Labs     01/04/25  1617 01/04/25  2121 01/05/25  0738 01/05/25  1215   POCGLU 200* 181* 250* 304*     Blood Sugar Average: Last 72 hrs:  (P) 245    Hold oral medications metformin, Jardiance, Januvia  Continued on home dose Lantus 15 units at bedtime + sliding scale insulin  Monitor blood sugars and adjust insulin accordingly - will add scheduled mealtime insulin and increase Lantus to 20 units tonight   Diabetic diet

## 2025-01-05 NOTE — ASSESSMENT & PLAN NOTE
Wt Readings from Last 3 Encounters:   01/03/25 99.9 kg (220 lb 4 oz)   12/23/24 104 kg (230 lb)   12/23/24 104 kg (229 lb)   Echo (12/13/24) - EF 50%, abnormal diastolic function  Torsemide held and receiving gentle IV fluids per nephrology   Monitor volume status with daily weights, I&Os

## 2025-01-05 NOTE — CONSULTS
"Consultation - Infectious Disease   Name: Thomas Horne 79 y.o. male I MRN: [id number]  Unit/Bed#: [facility] 424-01 I Date of Admission: 1/4/2025   Date of Service: 1/5/2025 I Hospital Day: 1   Inpatient consult to Infectious Diseases  Consult performed by: Dilip Martinez MD  Consult ordered by: Felecia Louie MD        Physician Requesting Evaluation: Cynthia Castorena, *   Reason for Evaluation / Principal Problem: MRSE bacteremia.    Assessment & Plan  Staphylococcus epidermidis bacteremia  Patient has very poor blood draw and IV access.  There appears to be growth of MRSE in \"both\" admission blood cultures.  However, this blood cultures were drawn at the same time and at the same site.  I suspected they were in reality a single blood draw.  Patient is clinically and systemically well, without evidence of sepsis or systemic toxicity.  I suspect that this is contaminant blood draw and not MRSA bacteremia.  Patient was started on IV vancomycin.  Blood cultures were repeated today at 2 different sites.  For now, will continue IV vancomycin.  If repeat blood cultures have no growth, will likely discontinue antibiotic.  Follow-up repeat blood cultures from this morning.  Continue IV vancomycin for now.  Will discontinue IV vancomycin if repeat blood cultures have no growth.  Diabetic ulcer of right midfoot associated with diabetes mellitus due to underlying condition, limited to breakdown of skin (HCC)  Ulcer appears to be ischemic on examination, without any signs of cellulitis.  Arteriogram is pending for next week.  No antibiotic needed for this indication.  No antibiotic needed for this indication.  Serial foot exams.  PAD (peripheral artery disease) (HCC)  Patient has extensive and severe PAD, status post revascularization.  Vascular surgery evaluation noted, with plan for arteriogram noted.  Follow-up arteriogram findings.  Vascular surgery follow-up.  S/P placement of cardiac pacemaker  If patient does indeed " have true bacteremia, there would be some concern for PPM infection.  However, at present, patient does not appear to have an active infection or bacteremia, as in above.  Follow-up repeat blood cultures.  Acute kidney injury superimposed on stage 3b chronic kidney disease ([facility])  Lab Results   Component Value Date    EGFR 20 01/05/2025    EGFR 23 01/04/2025    EGFR 36 01/03/2025    CREATININE 2.75 (H) 01/05/2025    CREATININE 2.45 (H) 01/04/2025    CREATININE 1.73 (H) 01/03/2025   Creatinine continues to rise.  Antibiotic dosages adjusted accordingly.  Monitor creatinine.  Acute metabolic encephalopathy  This is likely multifactorial, secondary to IVY and foot/toe ischemia.  Will defer any workup to primary service.  Monitor mental status.  Workup per primary service.  Type 2 diabetes mellitus with diabetic polyneuropathy, with long-term current use of insulin ([facility])  Lab Results   Component Value Date    HGBA1C 6.2 (H) 10/14/2024       Recent Labs     01/04/25  1053 01/04/25  1617 01/04/25  2121 01/05/25  0738   POCGLU 211* 200* 181* 250*       Blood Sugar Average: Last 72 hrs:  (P) 215.5  This is risk factor for poor wound healing.  Management per primary service.    Prior records reviewed in detail.  Discussed with patient and his daughters in detail regarding the above plan.  I have discussed with Cintyha Sagastume PA-C from primary service regarding the above plan to discontinue cefazolin and continue IV vancomycin pending repeat blood cultures.  She agrees with the plan.    Antibiotics:  Cefazolin/vancomycin  Antibiotic # 2    History of Present Illness   Thomas Horne is a 79 y.o. year old male, with multiple medical problems including severe PAD, CKD, CHF, CAD, DM, PPM and chronic foot ulcer, who was sent from wound center to [facility] ER on 1/3 due to worsening appearance of right great toe.  Due to concern for toe ischemia, patient was sent to [facility] for arteriogram and vascular surgery  management.  He was started on IV cefazolin for presumptive toe/foot cellulitis.  Admission blood cultures now have growth of MRSE.  IV vancomycin was started.  We are asked to evaluate the patient.    At present, patient feels well.  He has no specific complaints.  He has no pain in his right foot or great toe.  He has no chills.  However, his daughter states that he appears confused to them.    A complete review of systems is negative other than that noted in the HPI.    I have reviewed the patient's PMH, PSH, Social History, Family History, Meds, and Allergies    Objective :  Temp:  [98.1 °F (36.7 °C)-99 °F (37.2 °C)] 98.1 °F (36.7 °C)  HR:  [64-77] 67  BP: ()/(51-57) 93/52  SpO2:  [93 %-95 %] 93 %  O2 Device: None (Room air)    General:  No acute distress  Psychiatric:  Awake and alert  Pulmonary:  Normal respiratory excursion without accessory muscle use  Abdomen:  Soft, nontender  Extremities: Trace leg edema.  Right great toe with ischemic ulcer and moderate ischemic changes.  No purulence.  No warmth.  No tenderness.  No fluctuance.  Skin:  No rashes      Lab Results: I have reviewed the following results:  Results from last 7 days   Lab Units 01/05/25  0647 01/04/25  0651 01/03/25  1459   WBC Thousand/uL 12.36* 13.98* 10.06   HEMOGLOBIN g/dL 12.8 11.9* 14.3   PLATELETS Thousands/uL 182 224 307     Results from last 7 days   Lab Units 01/05/25  0647 01/04/25  0651 01/03/25  1459   SODIUM mmol/L 134* 133* 135   POTASSIUM mmol/L 3.7 3.8 4.5   CHLORIDE mmol/L 98 100 100   CO2 mmol/L 25 28 30   BUN mg/dL 47* 42* 34*   CREATININE mg/dL 2.75* 2.45* 1.73*   EGFR ml/min/1.73sq m 20 23 36   CALCIUM mg/dL 8.5 8.5 9.9     Results from last 7 days   Lab Units 01/04/25  2217 01/03/25  2035 01/03/25  1513   BLOOD CULTURE  Received in Microbiology Lab. Culture in Progress.  Received in Microbiology Lab. Culture in Progress.  --   --    GRAM STAIN RESULT   --  Gram positive cocci in chains*  Gram positive cocci in  chains and clusters* No Polys or Bacteria seen   WOUND CULTURE   --   --  1+ Growth of Staphylococcus aureus*         Results from last 7 days   Lab Units 01/03/25  1459   CRP mg/L 10.7*               Imaging Results Review: I personally reviewed the following image studies in PACS and associated radiology reports: Right foot x-ray. My interpretation of the radiology images/reports is: X-ray is without obvious osteomyelitis..

## 2025-01-05 NOTE — ASSESSMENT & PLAN NOTE
Lab Results   Component Value Date    EGFR 23 01/04/2025    EGFR 36 01/03/2025    EGFR 37 12/19/2024    CREATININE 2.45 (H) 01/04/2025    CREATININE 1.73 (H) 01/03/2025    CREATININE 1.69 (H) 12/19/2024   Baseline creatinine 1 to 1.6  Creatinine was 1.7 on presentation to [facility] and increased to 2.45  Losartan/torsemide held and receiving gentle IV fluids  Monitor creatinine  Nephrology consulted as above as needs A-gram

## 2025-01-05 NOTE — PROGRESS NOTES
Progress Note - Vascular Surgery   Name: Thomas Horne 79 y.o. male I MRN: [id number]  [facility] Date of Admission: 1/4/2025   Date of Service: 1/4/2025 I Hospital Day: 0    Assessment & Plan  PAD (peripheral artery disease) ([facility])  80 y/o M w/ PAD s/p R CFA-peroneal bypass with reversed CryoVein 11/16/2023, s/p L distal SFA to BK pop bypass with PTFE 8/30/2024 ([place]) presents with nonhealing right hallux wound.    PMHx former smoker with HTN, HLD, CAD s/p CABG, AAA s/p EVAR, DM type II, CHF, pulmonary HTN, CKD, prostate CA s/p prostatectomy with urinary sphincter placement, provoked LLE DVT.    LEAD 12/10-  RIGHT- high grade stenosis vs occlusion distal superficial femoral artery. Posterior tibial and anterior tibial artery occlusive disease. The femoral to popliteal artery bypass graft could not be identified.  FABIOLA 0.47/x/x; PVR/ PPG tracings are dampened.  LEFT- 50-75% stenosis tibioperoneal trunk/distal anastomosis distal superficial femoral- below knee popliteal bypass graft. Peroneal artery occlusive disease.  FABIOLA 0.79/105/51; PVR/ PPG tracings are dampened.    Plan:  -Patient transferred for RLE diagnostic Arteriogram, vascular evaluation w/ possible planning for redo bypass  -IR consult for Agram next week, anticipate at least mid-week, discussed w/ patient, will update with scheduling   -Continue ASA, Plavix, Statin  -Podiatry consultation for Foot wounds   -Nephrology consultation for CKD, renal prep and clearance for Agram      24 Hour Events : transferred to [facility] from [facility]  Subjective : Denies pain, wound present for a couple of months    Objective :  Temp:  [97.3 °F (36.3 °C)-99 °F (37.2 °C)] 98.7 °F (37.1 °C)  HR:  [64-79] 77  BP: ()/(50-57) 108/57  Resp:  [18-20] 20  SpO2:  [93 %-96 %] 93 %  O2 Device: None (Room air)     I/O       None            Physical Exam  Constitutional:       General: He is not in acute distress.     Appearance: Normal appearance.   HENT:       "Head: Normocephalic and atraumatic.      Nose: Nose normal.   Eyes:      Extraocular Movements: Extraocular movements intact.   Cardiovascular:      Rate and Rhythm: Normal rate and regular rhythm.      Pulses:           Femoral pulses are 2+ on the right side and 2+ on the left side.       Dorsalis pedis pulses are detected w/ Doppler on the right side and detected w/ Doppler on the left side.        Posterior tibial pulses are detected w/ Doppler on the left side.   Pulmonary:      Effort: Pulmonary effort is normal.   Abdominal:      General: Abdomen is flat.      Palpations: Abdomen is soft.   Musculoskeletal:      Cervical back: Neck supple.   Skin:     General: Skin is warm and dry.   Neurological:      General: No focal deficit present.      Mental Status: He is alert and oriented to person, place, and time.      Sensory: No sensory deficit.      Motor: No weakness.   Psychiatric:         Mood and Affect: Mood normal.         Thought Content: Thought content normal.         Judgment: Judgment normal.           Lab Results: I have reviewed the following results:  CBC with diff:   Lab Results   Component Value Date    WBC 13.98 (H) 01/04/2025    HGB 11.9 (L) 01/04/2025    HCT 36.4 (L) 01/04/2025    MCV 84 01/04/2025     01/04/2025    RBC 4.34 01/04/2025    MCH 27.4 01/04/2025    MCHC 32.7 01/04/2025    RDW 14.6 01/04/2025    MPV 10.8 01/04/2025    NRBC 0 01/03/2025   ,   BMP/CMP:  Lab Results   Component Value Date    SODIUM 133 (L) 01/04/2025    K 3.8 01/04/2025     01/04/2025    CO2 28 01/04/2025    CO2 25 08/30/2024    BUN 42 (H) 01/04/2025    CREATININE 2.45 (H) 01/04/2025    GLUCOSE 206 (H) 08/30/2024    CALCIUM 8.5 01/04/2025    AST 16 12/19/2024    ALT 6 (L) 12/19/2024    ALKPHOS 41 12/19/2024    EGFR 23 01/04/2025   ,   Lipid Panel: No results found for: \"CHOL\",   Coags:   Lab Results   Component Value Date    PTT 35 (H) 12/12/2024    INR 1.08 12/12/2024   ,   Blood Culture:   Lab Results " "  Component Value Date    BLOODCX No Growth After 5 Days. 06/16/2023    BLOODCX No Growth After 5 Days. 06/16/2023   ,   Urinalysis:   Lab Results   Component Value Date    COLORU Yellow 12/12/2024    CLARITYU Clear 12/12/2024    SPECGRAV 1.020 12/12/2024    PHUR 5.5 12/12/2024    LEUKOCYTESUR Negative 12/12/2024    NITRITE Negative 12/12/2024    GLUCOSEU 300 (3/10%) (A) 12/12/2024    KETONESU Negative 12/12/2024    BILIRUBINUR Negative 12/12/2024    BLOODU Negative 12/12/2024   ,   Urine Culture: No results found for: \"URINECX\",   Wound Culure:   Lab Results   Component Value Date    WOUNDCULT Culture too young- will reincubate 01/03/2025       Imaging Results Review: I personally reviewed the following image studies in PACS and associated radiology reports: Ultrasound(s). My interpretation of the radiology images/reports is: As above.  Other Study Results Review: No additional pertinent studies reviewed.    VTE Prophylaxis: Heparin  "

## 2025-01-06 ENCOUNTER — RESULTS FOLLOW-UP (OUTPATIENT)
Dept: EMERGENCY DEPT | Facility: HOSPITAL | Age: 80
End: 2025-01-06

## 2025-01-06 LAB
ANION GAP SERPL CALCULATED.3IONS-SCNC: 8 MMOL/L (ref 4–13)
BACTERIA BLD CULT: ABNORMAL
BACTERIA WND AEROBE CULT: ABNORMAL
BACTERIA WND AEROBE CULT: ABNORMAL
BASOPHILS # BLD AUTO: 0.01 THOUSANDS/ΜL (ref 0–0.1)
BASOPHILS NFR BLD AUTO: 0 % (ref 0–1)
BUN SERPL-MCNC: 44 MG/DL (ref 5–25)
CALCIUM SERPL-MCNC: 8.4 MG/DL (ref 8.4–10.2)
CHLORIDE SERPL-SCNC: 97 MMOL/L (ref 96–108)
CO2 SERPL-SCNC: 27 MMOL/L (ref 21–32)
CREAT SERPL-MCNC: 2.03 MG/DL (ref 0.6–1.3)
EOSINOPHIL # BLD AUTO: 0.15 THOUSAND/ΜL (ref 0–0.61)
EOSINOPHIL NFR BLD AUTO: 2 % (ref 0–6)
ERYTHROCYTE [DISTWIDTH] IN BLOOD BY AUTOMATED COUNT: 14.5 % (ref 11.6–15.1)
GFR SERPL CREATININE-BSD FRML MDRD: 29 ML/MIN/1.73SQ M
GLUCOSE SERPL-MCNC: 111 MG/DL (ref 65–140)
GLUCOSE SERPL-MCNC: 117 MG/DL (ref 65–140)
GLUCOSE SERPL-MCNC: 138 MG/DL (ref 65–140)
GLUCOSE SERPL-MCNC: 183 MG/DL (ref 65–140)
GLUCOSE SERPL-MCNC: 99 MG/DL (ref 65–140)
GRAM STN SPEC: ABNORMAL
HCT VFR BLD AUTO: 35.6 % (ref 36.5–49.3)
HGB BLD-MCNC: 11.6 G/DL (ref 12–17)
IMM GRANULOCYTES # BLD AUTO: 0.04 THOUSAND/UL (ref 0–0.2)
IMM GRANULOCYTES NFR BLD AUTO: 1 % (ref 0–2)
LYMPHOCYTES # BLD AUTO: 0.3 THOUSANDS/ΜL (ref 0.6–4.47)
LYMPHOCYTES NFR BLD AUTO: 5 % (ref 14–44)
MAGNESIUM SERPL-MCNC: 1.8 MG/DL (ref 1.9–2.7)
MCH RBC QN AUTO: 27.2 PG (ref 26.8–34.3)
MCHC RBC AUTO-ENTMCNC: 32.6 G/DL (ref 31.4–37.4)
MCV RBC AUTO: 84 FL (ref 82–98)
MECA+MECC ISLT/SPM QL: DETECTED
MONOCYTES # BLD AUTO: 0.28 THOUSAND/ΜL (ref 0.17–1.22)
MONOCYTES NFR BLD AUTO: 5 % (ref 4–12)
NEUTROPHILS # BLD AUTO: 5.35 THOUSANDS/ΜL (ref 1.85–7.62)
NEUTS SEG NFR BLD AUTO: 87 % (ref 43–75)
NRBC BLD AUTO-RTO: 0 /100 WBCS
PHOSPHATE SERPL-MCNC: 2.3 MG/DL (ref 2.3–4.1)
PLATELET # BLD AUTO: 159 THOUSANDS/UL (ref 149–390)
PMV BLD AUTO: 11.5 FL (ref 8.9–12.7)
POTASSIUM SERPL-SCNC: 3.4 MMOL/L (ref 3.5–5.3)
RBC # BLD AUTO: 4.26 MILLION/UL (ref 3.88–5.62)
S EPIDERMIDIS DNA BLD POS QL NAA+NON-PRB: DETECTED
SODIUM SERPL-SCNC: 132 MMOL/L (ref 135–147)
STREPTOCOCCUS DNA BLD POS NAA+NON-PROBE: DETECTED
VANCOMYCIN SERPL-MCNC: 13.8 UG/ML (ref 10–20)
WBC # BLD AUTO: 6.13 THOUSAND/UL (ref 4.31–10.16)

## 2025-01-06 PROCEDURE — 80048 BASIC METABOLIC PNL TOTAL CA: CPT | Performed by: INTERNAL MEDICINE

## 2025-01-06 PROCEDURE — G0545 PR INHERENT VISIT TO INPT: HCPCS | Performed by: INTERNAL MEDICINE

## 2025-01-06 PROCEDURE — 99232 SBSQ HOSP IP/OBS MODERATE 35: CPT | Performed by: PHYSICIAN ASSISTANT

## 2025-01-06 PROCEDURE — 80202 ASSAY OF VANCOMYCIN: CPT | Performed by: INTERNAL MEDICINE

## 2025-01-06 PROCEDURE — 85025 COMPLETE CBC W/AUTO DIFF WBC: CPT | Performed by: PHYSICIAN ASSISTANT

## 2025-01-06 PROCEDURE — 84100 ASSAY OF PHOSPHORUS: CPT | Performed by: INTERNAL MEDICINE

## 2025-01-06 PROCEDURE — 83735 ASSAY OF MAGNESIUM: CPT | Performed by: INTERNAL MEDICINE

## 2025-01-06 PROCEDURE — 82948 REAGENT STRIP/BLOOD GLUCOSE: CPT

## 2025-01-06 PROCEDURE — 99232 SBSQ HOSP IP/OBS MODERATE 35: CPT | Performed by: INTERNAL MEDICINE

## 2025-01-06 RX ORDER — INSULIN GLARGINE 100 [IU]/ML
15 INJECTION, SOLUTION SUBCUTANEOUS
Status: DISCONTINUED | OUTPATIENT
Start: 2025-01-06 | End: 2025-01-09 | Stop reason: HOSPADM

## 2025-01-06 RX ORDER — ACETAMINOPHEN 325 MG/1
975 TABLET ORAL EVERY 6 HOURS PRN
Status: DISCONTINUED | OUTPATIENT
Start: 2025-01-06 | End: 2025-01-09 | Stop reason: HOSPADM

## 2025-01-06 RX ORDER — POTASSIUM CHLORIDE 1500 MG/1
40 TABLET, EXTENDED RELEASE ORAL ONCE
Status: COMPLETED | OUTPATIENT
Start: 2025-01-06 | End: 2025-01-06

## 2025-01-06 RX ORDER — VANCOMYCIN HYDROCHLORIDE 1 G/200ML
10 INJECTION, SOLUTION INTRAVENOUS ONCE
Status: COMPLETED | OUTPATIENT
Start: 2025-01-06 | End: 2025-01-06

## 2025-01-06 RX ADMIN — POTASSIUM CHLORIDE 40 MEQ: 1500 TABLET, EXTENDED RELEASE ORAL at 09:06

## 2025-01-06 RX ADMIN — HEPARIN SODIUM 5000 UNITS: 5000 INJECTION, SOLUTION INTRAVENOUS; SUBCUTANEOUS at 05:22

## 2025-01-06 RX ADMIN — PENTOXIFYLLINE 400 MG: 400 TABLET, EXTENDED RELEASE ORAL at 09:13

## 2025-01-06 RX ADMIN — SODIUM CHLORIDE, SODIUM GLUCONATE, SODIUM ACETATE, POTASSIUM CHLORIDE, MAGNESIUM CHLORIDE, SODIUM PHOSPHATE, DIBASIC, AND POTASSIUM PHOSPHATE 75 ML/HR: .53; .5; .37; .037; .03; .012; .00082 INJECTION, SOLUTION INTRAVENOUS at 02:59

## 2025-01-06 RX ADMIN — RANOLAZINE 1000 MG: 500 TABLET, FILM COATED, EXTENDED RELEASE ORAL at 09:06

## 2025-01-06 RX ADMIN — METOPROLOL SUCCINATE 100 MG: 100 TABLET, EXTENDED RELEASE ORAL at 09:06

## 2025-01-06 RX ADMIN — HEPARIN SODIUM 5000 UNITS: 5000 INJECTION, SOLUTION INTRAVENOUS; SUBCUTANEOUS at 13:45

## 2025-01-06 RX ADMIN — ISOSORBIDE MONONITRATE 90 MG: 60 TABLET, EXTENDED RELEASE ORAL at 09:06

## 2025-01-06 RX ADMIN — INSULIN LISPRO 1 UNITS: 100 INJECTION, SOLUTION INTRAVENOUS; SUBCUTANEOUS at 22:19

## 2025-01-06 RX ADMIN — INSULIN LISPRO 5 UNITS: 100 INJECTION, SOLUTION INTRAVENOUS; SUBCUTANEOUS at 12:32

## 2025-01-06 RX ADMIN — PENTOXIFYLLINE 400 MG: 400 TABLET, EXTENDED RELEASE ORAL at 12:31

## 2025-01-06 RX ADMIN — OXYCODONE HYDROCHLORIDE 5 MG: 5 TABLET ORAL at 20:49

## 2025-01-06 RX ADMIN — FENOFIBRATE 145 MG: 145 TABLET ORAL at 09:06

## 2025-01-06 RX ADMIN — PREGABALIN 150 MG: 75 CAPSULE ORAL at 09:06

## 2025-01-06 RX ADMIN — DULOXETINE HYDROCHLORIDE 30 MG: 30 CAPSULE, DELAYED RELEASE ORAL at 09:06

## 2025-01-06 RX ADMIN — RANOLAZINE 1000 MG: 500 TABLET, FILM COATED, EXTENDED RELEASE ORAL at 17:07

## 2025-01-06 RX ADMIN — PENTOXIFYLLINE 400 MG: 400 TABLET, EXTENDED RELEASE ORAL at 16:57

## 2025-01-06 RX ADMIN — ATORVASTATIN CALCIUM 40 MG: 40 TABLET, FILM COATED ORAL at 21:04

## 2025-01-06 RX ADMIN — OMEGA-3 FATTY ACIDS CAP 1000 MG 1000 MG: 1000 CAP at 09:06

## 2025-01-06 RX ADMIN — VANCOMYCIN HYDROCHLORIDE 1000 MG: 1 INJECTION, SOLUTION INTRAVENOUS at 12:31

## 2025-01-06 RX ADMIN — INSULIN LISPRO 5 UNITS: 100 INJECTION, SOLUTION INTRAVENOUS; SUBCUTANEOUS at 16:58

## 2025-01-06 RX ADMIN — INSULIN LISPRO 5 UNITS: 100 INJECTION, SOLUTION INTRAVENOUS; SUBCUTANEOUS at 09:15

## 2025-01-06 RX ADMIN — HEPARIN SODIUM 5000 UNITS: 5000 INJECTION, SOLUTION INTRAVENOUS; SUBCUTANEOUS at 21:05

## 2025-01-06 RX ADMIN — ACETAMINOPHEN 975 MG: 325 TABLET, FILM COATED ORAL at 20:49

## 2025-01-06 RX ADMIN — PANTOPRAZOLE SODIUM 40 MG: 40 TABLET, DELAYED RELEASE ORAL at 05:22

## 2025-01-06 RX ADMIN — INSULIN GLARGINE 15 UNITS: 100 INJECTION, SOLUTION SUBCUTANEOUS at 21:04

## 2025-01-06 RX ADMIN — CLOPIDOGREL BISULFATE 75 MG: 75 TABLET ORAL at 09:06

## 2025-01-06 RX ADMIN — PREGABALIN 150 MG: 75 CAPSULE ORAL at 16:57

## 2025-01-06 RX ADMIN — PREGABALIN 150 MG: 75 CAPSULE ORAL at 20:49

## 2025-01-06 RX ADMIN — SODIUM CHLORIDE, SODIUM GLUCONATE, SODIUM ACETATE, POTASSIUM CHLORIDE, MAGNESIUM CHLORIDE, SODIUM PHOSPHATE, DIBASIC, AND POTASSIUM PHOSPHATE 50 ML/HR: .53; .5; .37; .037; .03; .012; .00082 INJECTION, SOLUTION INTRAVENOUS at 17:04

## 2025-01-06 RX ADMIN — OMEGA-3 FATTY ACIDS CAP 1000 MG 1000 MG: 1000 CAP at 17:07

## 2025-01-06 RX ADMIN — Medication 1 TABLET: at 09:06

## 2025-01-06 NOTE — ASSESSMENT & PLAN NOTE
2 of 2 blood cultures collected 1/3/2025 grew gram-positive cocci; both sets growing strep parasanguinis and one also growing staph epidermidis   Repeat blood cultures x 2 collected on 1/4/2025 with no growth to date   ID consult appreciated, continues on IV vancomycin for now

## 2025-01-06 NOTE — PROGRESS NOTES
Thomas Horne is a 79 y.o. male who is currently ordered Vancomycin IV with management by the Pharmacy Consult service.  Relevant clinical data and objective / subjective history reviewed.  Vancomycin Assessment:  Indication and Goal AUC/Trough: Bacteremia (goal -600, trough >10)  Clinical Status: stable  Micro:     Renal Function:  SCr: 2.03 mg/dL (baseline 1-1.4)  CrCl: 36 mL/min  Renal replacement: Not on dialysis. If plans for dialysis change please contact pharmacy as vanco dosing will differ.   Days of Therapy: 4  Current Dose: pulse dosing   Vancomycin Plan:  New Dosing: random level of 13.8. Will give 1 gram IV x 1 dose.   Next Level: 1/7 am labs   Renal Function Monitoring: Daily BMP and UOP  Pharmacy will continue to follow closely for s/sx of nephrotoxicity, infusion reactions and appropriateness of therapy.  BMP and CBC will be ordered per protocol. We will continue to follow the patient’s culture results and clinical progress daily.    Torey Platt, Pharmacist

## 2025-01-06 NOTE — PLAN OF CARE
Problem: PAIN - ADULT  Goal: Verbalizes/displays adequate comfort level or baseline comfort level  Description: Interventions:  - Encourage patient to monitor pain and request assistance  - Assess pain using appropriate pain scale  - Administer analgesics based on type and severity of pain and evaluate response  - Implement non-pharmacological measures as appropriate and evaluate response  - Consider cultural and social influences on pain and pain management  - Notify physician/advanced practitioner if interventions unsuccessful or patient reports new pain  Outcome: Progressing     Problem: INFECTION - ADULT  Goal: Absence or prevention of progression during hospitalization  Description: INTERVENTIONS:  - Assess and monitor for signs and symptoms of infection  - Monitor lab/diagnostic results  - Monitor all insertion sites, i.e. indwelling lines, tubes, and drains  - Monitor endotracheal if appropriate and nasal secretions for changes in amount and color  - Graham appropriate cooling/warming therapies per order  - Administer medications as ordered  - Instruct and encourage patient and family to use good hand hygiene technique  - Identify and instruct in appropriate isolation precautions for identified infection/condition  Outcome: Progressing

## 2025-01-06 NOTE — PROGRESS NOTES
Progress Note - Hospitalist   Name: Thomas Horne 79 y.o. male I MRN: [id number]  Unit/Bed#: [facility] 424-01 I Date of Admission: 1/4/2025   Date of Service: 1/6/2025 I Hospital Day: 2    Assessment & Plan  Diabetic ulcer of right midfoot associated with diabetes mellitus due to underlying condition, limited to breakdown of skin (HCC)  Patient was sent to the ED at [facility] on 1/3/25 after wound care noted worsening of his right foot ulcer; note underlying history of PAD. Evaluated by vascular surgery in the ED and transferred to [facility] for angiogram and possible bypass redo.  Vascular surgery following, possible redo bypass pending results of angiogram   IR consulted for A-gram, which is tentatively planned for tomorrow   Podiatry following, continue local wound care   ID following, continuing vancomycin for now  Continue ASA, Plavix, statin  Analgesics as needed  PT/OT evaluations  Staphylococcus epidermidis bacteremia  2 of 2 blood cultures collected 1/3/2025 grew gram-positive cocci; both sets growing strep parasanguinis and one also growing staph epidermidis   Repeat blood cultures x 2 collected on 1/4/2025 with no growth to date   ID consult appreciated, continues on IV vancomycin for now  Acute kidney injury superimposed on stage 3b chronic kidney disease (HCC)  Lab Results   Component Value Date    EGFR 29 01/06/2025    EGFR 20 01/05/2025    EGFR 23 01/04/2025    CREATININE 2.03 (H) 01/06/2025    CREATININE 2.75 (H) 01/05/2025    CREATININE 2.45 (H) 01/04/2025   Noted with rise in creatinine to 2.45 on 1/4/2025 increased from baseline of approximately 1.6-1.8  Creatinine peaked at 2.75 on 1/5/2025  Appreciate nephrology consult and recommendations  Currently on gentle continuous IV fluids with improvement   Continue holding losartan/torsemide   Ok for angiogram tomorrow if renal function stable/improved   Limit nephrotoxins and avoid hypotension  Monitor intake and output  Daily BMP  Acute  metabolic encephalopathy  Family noting mild confusion 1/5/2025, no focal neuro deficits on exam  Likely multifactorial with component of hospital delirium  Delirium precautions, supportive cares, frequent reorientation  Type 2 diabetes mellitus with diabetic polyneuropathy, with long-term current use of insulin ([facility])  Lab Results   Component Value Date    HGBA1C 6.2 (H) 10/14/2024     Recent Labs     01/05/25  1718 01/05/25  2155 01/06/25  0806 01/06/25  1221   POCGLU 108 190* 111 138     Blood Sugar Average: Last 72 hrs:  (P) [id number]    Hold oral medications metformin, Jardiance, Januvia  Home insulin regimen: Lantus 15 units at bedtime   Current insulin regimen Lantus 20 units QHS, Humalog 5 units with meals + sliding scale insulin  Monitor blood sugars and adjust insulin accordingly - noted with borderline hypoglycemia this morning, will decrease Lantus back to 15 units tonight   Diabetic diet   Chronic diastolic congestive heart failure (HCC)  Wt Readings from Last 3 Encounters:   01/03/25 99.9 kg (220 lb 4 oz)   12/23/24 104 kg (230 lb)   12/23/24 104 kg (229 lb)   Echo (12/13/24) - EF 50%, abnormal diastolic function  Torsemide held and receiving gentle IV fluids per nephrology   Monitor volume status with daily weights, I&Os  PAD (peripheral artery disease) ([facility])  History of bilateral lower extremity bypass   Plan for A-gram as above  Coronary artery disease involving native coronary artery of native heart without angina pectoris  S/p CABG x 3 in 2002 and PCI with drug-eluting stent to distal left circumflex in 2021  Continue Plavix, statin, BB, Ranexa, Imdur  Primary hypertension  Continue home dose Amlodipine and Metoprolol   Losartan and Torsemide held in the setting of IVY  Monitor BP routinely   History of DVT (deep vein thrombosis)  Not on anticoagulation at home currently  Lower extremity Doppler done on 12/13/2024 showed no DVT  Heparin s/c for DVT prophylaxis  S/P placement of cardiac  pacemaker  Recent PPM placement on 12/16/2024 noted  Mixed hyperlipidemia  Continue statin, fish oil, fenofibrate    VTE Pharmacologic Prophylaxis: VTE Score: 9 High Risk (Score >/= 5) - Pharmacological DVT Prophylaxis Ordered: heparin. Sequential Compression Devices Ordered.    Mobility:   Basic Mobility Inpatient Raw Score: 15  JH-HLM Goal: 4: Move to chair/commode  JH-HLM Achieved: 3: Sit at edge of bed  JH-HLM Goal NOT achieved. Continue with multidisciplinary rounding and encourage appropriate mobility to improve upon JH-HLM goals.    Patient Centered Rounds: I performed bedside rounds with nursing staff today.   Discussions with Specialists or Other Care Team Provider: primary RN    Education and Discussions with Family / Patient: Updated  (daughter) via phone. Spoke with Whitley.     Current Length of Stay: 2 day(s)  Current Patient Status: Inpatient   Certification Statement: The patient will continue to require additional inpatient hospital stay due to pending angiogram and possible vascular surgery intervention  Discharge Plan: Anticipate discharge in >72 hrs to discharge location to be determined pending rehab evaluations.    Code Status: Level 1 - Full Code    Subjective   Patient reports feeling well today and offers no complaints. Specifically denies chest pain, SOB or pain in his leg. He is aware of tentative plan for angiogram tomorrow.     Objective :  Temp:  [97.8 °F (36.6 °C)-99.2 °F (37.3 °C)] 97.8 °F (36.6 °C)  HR:  [70-83] 70  BP: (113-121)/(58-87) 114/58  Resp:  [18] 18  SpO2:  [91 %-94 %] 91 %  O2 Device: None (Room air)    There is no height or weight on file to calculate BMI.     Input and Output Summary (last 24 hours):     Intake/Output Summary (Last 24 hours) at 1/6/2025 1410  Last data filed at 1/6/2025 0900  Gross per 24 hour   Intake 1146.25 ml   Output 1250 ml   Net -103.75 ml       Physical Exam  Vitals reviewed.   Constitutional:       General: He is not in acute  distress.  Cardiovascular:      Rate and Rhythm: Normal rate and regular rhythm.      Heart sounds: Murmur heard.   Pulmonary:      Effort: Pulmonary effort is normal. No respiratory distress.      Breath sounds: No wheezing, rhonchi or rales.   Neurological:      General: No focal deficit present.      Mental Status: He is alert and oriented to person, place, and time. Mental status is at baseline.           Lines/Drains:              Lab Results: I have reviewed the following results:   Results from last 7 days   Lab Units 01/06/25  0524 01/05/25  0647   WBC Thousand/uL 6.13 12.36*   HEMOGLOBIN g/dL 11.6* 12.8   HEMATOCRIT % 35.6* 39.5   PLATELETS Thousands/uL 159 182   BANDS PCT %  --  6   SEGS PCT % 87*  --    LYMPHO PCT % 5* 0*   MONO PCT % 5 0*   EOS PCT % 2 2     Results from last 7 days   Lab Units 01/06/25  0524   SODIUM mmol/L 132*   POTASSIUM mmol/L 3.4*   CHLORIDE mmol/L 97   CO2 mmol/L 27   BUN mg/dL 44*   CREATININE mg/dL 2.03*   ANION GAP mmol/L 8   CALCIUM mg/dL 8.4   GLUCOSE RANDOM mg/dL 99         Results from last 7 days   Lab Units 01/06/25  1221 01/06/25  0806 01/05/25  2155 01/05/25  1718 01/05/25  1215 01/05/25  0738 01/04/25  2121 01/04/25  1617 01/04/25  1053 01/04/25  0710 01/03/25  2312   POC GLUCOSE mg/dl 138 111 190* 108 304* 250* 181* 200* 211* 109 152*               Recent Cultures (last 7 days):   Results from last 7 days   Lab Units 01/04/25  2217 01/03/25  2035 01/03/25  1513   BLOOD CULTURE  No Growth at 24 hrs.  No Growth at 24 hrs. Streptococcus parasanguinis*  Streptococcus parasanguinis*  Staphylococcus epidermidis*  --    GRAM STAIN RESULT   --  Gram positive cocci in chains*  Gram positive cocci in chains and clusters* No Polys or Bacteria seen   WOUND CULTURE   --   --  1+ Growth of Staphylococcus aureus*  1+ Growth of       Imaging Results Review: No pertinent imaging studies reviewed.  Other Study Results Review: No additional pertinent studies reviewed.    Last 24  Hours Medication List:     Current Facility-Administered Medications:     amLODIPine (NORVASC) tablet 2.5 mg, Daily    atorvastatin (LIPITOR) tablet 40 mg, HS    clopidogrel (PLAVIX) tablet 75 mg, Daily    DULoxetine (CYMBALTA) delayed release capsule 30 mg, Daily    fenofibrate (TRICOR) tablet 145 mg, Daily    fish oil capsule 1,000 mg, BID    heparin (porcine) subcutaneous injection 5,000 Units, Q8H CONSTANTINE    HYDROmorphone (DILAUDID) injection 0.5 mg, Q4H PRN    insulin glargine (LANTUS) subcutaneous injection 15 Units 0.15 mL, HS    insulin lispro (HumALOG/ADMELOG) 100 units/mL subcutaneous injection 1-5 Units, TID AC **AND** Fingerstick Glucose (POCT), TID AC    insulin lispro (HumALOG/ADMELOG) 100 units/mL subcutaneous injection 1-5 Units, HS    insulin lispro (HumALOG/ADMELOG) 100 units/mL subcutaneous injection 5 Units, TID With Meals    isosorbide mononitrate (IMDUR) 24 hr tablet 90 mg, Daily    metoprolol succinate (TOPROL-XL) 24 hr tablet 100 mg, Daily    multi-electrolyte (PLASMALYTE-A/ISOLYTE-S PH 7.4) IV solution, Continuous, Last Rate: 75 mL/hr (01/06/25 0259)    multivitamin-minerals (CENTRUM) tablet 1 tablet, Daily    nitroglycerin (NITROSTAT) SL tablet 0.4 mg, Q5 Min PRN    oxyCODONE (ROXICODONE) IR tablet 5 mg, Q12H PRN    pantoprazole (PROTONIX) EC tablet 40 mg, Daily Before Breakfast    pentoxifylline (TRENtal) ER tablet 400 mg, TID With Meals    pregabalin (LYRICA) capsule 150 mg, TID    ranolazine (RANEXA) 12 hr tablet 1,000 mg, BID    [Held by provider] torsemide (DEMADEX) tablet 20 mg, Daily    vancomycin (VANCOCIN) IVPB (premix in dextrose) 1,000 mg 200 mL, Daily PRN    Administrative Statements   Today, Patient Was Seen By: Cinthya Sagastume PA-C  I have spent a total time of 30 minutes in caring for this patient on the day of the visit/encounter including Patient and family education, Impressions, Counseling / Coordination of care, Documenting in the medical record, Reviewing / ordering tests,  medicine, procedures  , Obtaining or reviewing history  , and Communicating with other healthcare professionals .    **Please Note: This note may have been constructed using a voice recognition system.**

## 2025-01-06 NOTE — ASSESSMENT & PLAN NOTE
Lab Results   Component Value Date    EGFR 29 01/06/2025    EGFR 20 01/05/2025    EGFR 23 01/04/2025    CREATININE 2.03 (H) 01/06/2025    CREATININE 2.75 (H) 01/05/2025    CREATININE 2.45 (H) 01/04/2025   Noted with rise in creatinine to 2.45 on 1/4/2025 increased from baseline of approximately 1.6-1.8  Creatinine peaked at 2.75 on 1/5/2025  Appreciate nephrology consult and recommendations  Currently on gentle continuous IV fluids with improvement   Continue holding losartan/torsemide   Ok for angiogram tomorrow if renal function stable/improved   Limit nephrotoxins and avoid hypotension  Monitor intake and output  Daily BMP

## 2025-01-06 NOTE — PLAN OF CARE
Problem: PAIN - ADULT  Goal: Verbalizes/displays adequate comfort level or baseline comfort level  Description: Interventions:  - Encourage patient to monitor pain and request assistance  - Assess pain using appropriate pain scale  - Administer analgesics based on type and severity of pain and evaluate response  - Implement non-pharmacological measures as appropriate and evaluate response  - Consider cultural and social influences on pain and pain management  - Notify physician/advanced practitioner if interventions unsuccessful or patient reports new pain  Outcome: Progressing     Problem: INFECTION - ADULT  Goal: Absence or prevention of progression during hospitalization  Description: INTERVENTIONS:  - Assess and monitor for signs and symptoms of infection  - Monitor lab/diagnostic results  - Monitor all insertion sites, i.e. indwelling lines, tubes, and drains  - Monitor endotracheal if appropriate and nasal secretions for changes in amount and color  - [place] appropriate cooling/warming therapies per order  - Administer medications as ordered  - Instruct and encourage patient and family to use good hand hygiene technique  - Identify and instruct in appropriate isolation precautions for identified infection/condition  Outcome: Progressing  Goal: Absence of fever/infection during neutropenic period  Description: INTERVENTIONS:  - Monitor WBC    Outcome: Progressing     Problem: SAFETY ADULT  Goal: Patient will remain free of falls  Description: INTERVENTIONS:  - Educate patient/family on patient safety including physical limitations  - Instruct patient to call for assistance with activity   - Consult OT/PT to assist with strengthening/mobility   - Keep Call bell within reach  - Keep bed low and locked with side rails adjusted as appropriate  - Keep care items and personal belongings within reach  - Initiate and maintain comfort rounds  - Make Fall Risk Sign visible to staff  - Offer Toileting every  Hours,  in advance of need  - Initiate/Maintain alarm  - Obtain necessary fall risk management equipment:   - Apply yellow socks and bracelet for high fall risk patients  - Consider moving patient to room near nurses station  Outcome: Progressing  Goal: Maintain or return to baseline ADL function  Description: INTERVENTIONS:  -  Assess patient's ability to carry out ADLs; assess patient's baseline for ADL function and identify physical deficits which impact ability to perform ADLs (bathing, care of mouth/teeth, toileting, grooming, dressing, etc.)  - Assess/evaluate cause of self-care deficits   - Assess range of motion  - Assess patient's mobility; develop plan if impaired  - Assess patient's need for assistive devices and provide as appropriate  - Encourage maximum independence but intervene and supervise when necessary  - Involve family in performance of ADLs  - Assess for home care needs following discharge   - Consider OT consult to assist with ADL evaluation and planning for discharge  - Provide patient education as appropriate  Outcome: Progressing  Goal: Maintains/Returns to pre admission functional level  Description: INTERVENTIONS:  - Perform AM-PAC 6 Click Basic Mobility/ Daily Activity assessment daily.  - Set and communicate daily mobility goal to care team and patient/family/caregiver.   - Collaborate with rehabilitation services on mobility goals if consulted  - Perform Range of Motion  times a day.  - Reposition patient every  hours.  - Dangle patient  times a day  - Stand patient  times a day  - Ambulate patient  times a day  - Out of bed to chair  times a day   - Out of bed for meals  times a day  - Out of bed for toileting  - Record patient progress and toleration of activity level   Outcome: Progressing     Problem: DISCHARGE PLANNING  Goal: Discharge to home or other facility with appropriate resources  Description: INTERVENTIONS:  - Identify barriers to discharge w/patient and caregiver  - Arrange for  needed discharge resources and transportation as appropriate  - Identify discharge learning needs (meds, wound care, etc.)  - Arrange for interpretive services to assist at discharge as needed  - Refer to Case Management Department for coordinating discharge planning if the patient needs post-hospital services based on physician/advanced practitioner order or complex needs related to functional status, cognitive ability, or social support system  Outcome: Progressing     Problem: Knowledge Deficit  Goal: Patient/family/caregiver demonstrates understanding of disease process, treatment plan, medications, and discharge instructions  Description: Complete learning assessment and assess knowledge base.  Interventions:  - Provide teaching at level of understanding  - Provide teaching via preferred learning methods  Outcome: Progressing     Problem: SKIN/TISSUE INTEGRITY - ADULT  Goal: Skin Integrity remains intact(Skin Breakdown Prevention)  Description: Assess:  -Perform Dev assessment every   -Clean and moisturize skin every   -Inspect skin when repositioning, toileting, and assisting with ADLS  -Assess under medical devices such as  every   -Assess extremities for adequate circulation and sensation     Bed Management:  -Have minimal linens on bed & keep smooth, unwrinkled  -Change linens as needed when moist or perspiring  -Avoid sitting or lying in one position for more than  hours while in bed  -Keep HOB at degrees     Toileting:  -Offer bedside commode  -Assess for incontinence every   -Use incontinent care products after each incontinent episode such as     Activity:  -Mobilize patient  times a day  -Encourage activity and walks on unit  -Encourage or provide ROM exercises   -Turn and reposition patient every  Hours  -Use appropriate equipment to lift or move patient in bed  -Instruct/ Assist with weight shifting every  when out of bed in chair  -Consider limitation of chair time  hour intervals    Skin  Care:  -Avoid use of baby powder, tape, friction and shearing, hot water or constrictive clothing  -Relieve pressure over bony prominences using   -Do not massage red bony areas    Next Steps:  -Teach patient strategies to minimize risks such as    -Consider consults to  interdisciplinary teams such as   Outcome: Progressing  Goal: Incision(s), wounds(s) or drain site(s) healing without S/S of infection  Description: INTERVENTIONS  - Assess and document dressing, incision, wound bed, drain sites and surrounding tissue  - Provide patient and family education  - Perform skin care/dressing changes every   Outcome: Progressing  Goal: Pressure injury heals and does not worsen  Description: Interventions:  - Implement low air loss mattress or specialty surface (Criteria met)  - Apply silicone foam dressing  - Instruct/assist with weight shifting every  minutes when in chair   - Limit chair time to  hour intervals  - Use special pressure reducing interventions such as  when in chair   - Apply fecal or urinary incontinence containment device   - Perform passive or active ROM every   - Turn and reposition patient & offload bony prominences every  hours   - Utilize friction reducing device or surface for transfers   - Consider consults to  interdisciplinary teams such as   - Use incontinent care products after each incontinent episode such as   - Consider nutrition services referral as needed  Outcome: Progressing

## 2025-01-06 NOTE — ASSESSMENT & PLAN NOTE
Patient was sent to the ED at [facility] on 1/3/25 after wound care noted worsening of his right foot ulcer; note underlying history of PAD. Evaluated by vascular surgery in the ED and transferred to [facility] for angiogram and possible bypass redo.  Vascular surgery following, possible redo bypass pending results of angiogram   IR consulted for A-gram, which is tentatively planned for tomorrow   Podiatry following, continue local wound care   ID following, continuing vancomycin for now  Continue ASA, Plavix, statin  Analgesics as needed  PT/OT evaluations

## 2025-01-06 NOTE — ASSESSMENT & PLAN NOTE
Lab Results   Component Value Date    HGBA1C 6.2 (H) 10/14/2024     Recent Labs     01/05/25  1718 01/05/25  2155 01/06/25  0806 01/06/25  1221   POCGLU 108 190* 111 138     Blood Sugar Average: Last 72 hrs:  (P) 183.2227277153110182    Hold oral medications metformin, Jardiance, Januvia  Home insulin regimen: Lantus 15 units at bedtime   Current insulin regimen Lantus 20 units QHS, Humalog 5 units with meals + sliding scale insulin  Monitor blood sugars and adjust insulin accordingly - noted with borderline hypoglycemia this morning, will decrease Lantus back to 15 units tonight   Diabetic diet

## 2025-01-06 NOTE — ASSESSMENT & PLAN NOTE
IVY most likely secondary to ischemic injury secondary to hypotension plus prerenal azotemia in the presence of Jardiance and torsemide plus failure to autoregulate and in the presence of losartan some component of IC GN in light of bacteremia.  After review of records it appears that the patient has a baseline Creatinine of 1.6-1.8 mg/dL.  Renal function improving with creatinine 2.03.  Remains on IV fluids.

## 2025-01-06 NOTE — UTILIZATION REVIEW
Initial Clinical Review    Tx start date 1/3/25 in [facility] ED    Admission: Date/Time/Statement:   Admission Orders (From admission, onward)       Ordered        01/04/25 2053  INPATIENT ADMISSION  Once                          Orders Placed This Encounter   Procedures    INPATIENT ADMISSION     Standing Status:   Standing     Number of Occurrences:   1     Level of Care:   Med Surg [16]     Estimated length of stay:   More than 2 Midnights     Certification:   I certify that inpatient services are medically necessary for this patient for a duration of greater than two midnights. See H&P and MD Progress Notes for additional information about the patient's course of treatment.     Initial Presentation: 79 y.o. male to hospitals transferred from [facility] ED where he initially presented on 1/3/25 with worsening R foot ulcer.  Pt with h/o of PAD, seen by vascular surgery in ED and transferred to hospitals for an A-gram and possible bypass redo.  PMHX: former smoker with HTN, HLD, CAD s/p CABG, AAA s/p EVAR, DM type II, HFpEF, pulmonary HTN, CKD, prostate CA s/p prostatectomy with urinary sphincter placement, provoked LLE DVT.   On presentation pt stats pain controlled, R foot ulcer noted. WBC 13.98, Hgb 11.9, Hct 36.4, Sodium 133, BUN 42, Cr 2.45.  Admitted Inpatient to MS Unit with Diabetic R Foot, Bacteremia, IVY -- Plan: Continue IV cefazolin, 2/2 blood cxs from 1/3 positive for gram-pos cocci, continue cefazolin and vancomycin. ID consult. Continue PTA po meds, holding Losartan/torsemide. Gentle IVFs. Recheck BMP in AM. Nephrology consulted. Continue Lantus 15 units at bedtime, sliding scale insulin. Hep sq, SCDs for DVT ppx.     Anticipated Length of Stay/Certification Statement: Patient will be admitted on an inpatient basis with an anticipated length of stay of greater than 2 midnights secondary to nonhealing foot ulcer with peripheral arterial disease.     Date: 1/5  Day 3: Has surpassed a 2nd midnight with active  treatments and services.  Vascular surgery note --   Plan:  -IR consulted for Agram -- plan on intervention later this week pending IR availability, noting need for renal clearance.  NPO order I'll defer to the vascular team for now.  -Continue ASA, Plavix, Statin  -Podiatry consulted for foot wounds --   Plan: continue local wound care pending right lower extremity angiogram for potential vascular surgery.  Bacteremia noted in mst recent blood cultures, however ID believes they were from one single site clood draw and likely contaminant.  Left heel wound has fully healed in the outpatient setting and shows no signs of infection.  R foot xray no osseus change  Reviewed leads. R side ischemic level at FABIOLA and no waveforms at MT and GT.  White count of 12.36 and vitals are stable.  Local wound care consisting of Betadine DSD. Wound care instructions placed.  Elevation on green foam wedges or pillows when non-ambulatory  WBAT    Nephrology consult -- VIY most likely 2/2 ischemic injury 2/2 hypotension plus prerenal azotemia in the presence of Jardiance and torsemide plus failure to autoregulate and in the presence of losartan some component of IC GN in light of bacteremia.  Baseline Creatinine of 1.6-1.8 mg/dL.  Admission creatinine of 1.73 mg/dL at outside [place] and then 2.45 mg/dL on 1/4 at [place].  Creatinine today is at 2.75 mg/dL unfortunately continues to deteriorate.  Placed on Plasma-Lyte 75 cc an hour for now likely DC in a.m.  From a renal standpoint patient is not stable for arteriogram at this time.  Discussed risk for IVY 2/2 SANJUANA and severity of IVY needing RRT in depth with the patient.  At this time would like to hold arteriogram unless emergent until renal parameters are stable and improving and closer to baseline.  check BMP, mag, phos in a.m.  Await renal recovery.  Place on a renal diet when allowed diet order.   Strict I/O, Daily weights  Monitor for urinary retention  Avoid nephrotoxins, adjust  meds to appropriate GFR.  Likely has underlying CKD 2/2 CKD, HTN, nephrosclerosis plus some component of renovascular disease  Optimize hemodynamic status to avoid renal ischemic injury.  Maintain MAP > 65mmHg  Avoid BP fluctuations.  Hold home torsemide, losartan and Jardiance for now  Adjust holding parameters for current meds to hold for SBP less than 130    ID consult -- Staphylococcus epidermidis bacteremia - F/u repeat blood cultures from this morning. Continue IV vancomycin for now.  Will d/c IV vancomycin if repeat blood cultures have no growth.   Diabetic ulcer of right midfoot - ulcer appears to be ischemic on exam, without any signs of cellulitis.  Arteriogram is pending for next week.  No abx needed for this indication.      Scheduled Medications:  amLODIPine, 2.5 mg, Oral, Daily  atorvastatin, 40 mg, Oral, HS  cefazolin 2,000 mg IV Q8H  clopidogrel, 75 mg, Oral, Daily  DULoxetine, 30 mg, Oral, Daily  fenofibrate, 145 mg, Oral, Daily  fish oil, 1,000 mg, Oral, BID  heparin (porcine), 5,000 Units, Subcutaneous, Q8H CONSTANTINE  insulin glargine, 20 Units, Subcutaneous, HS  insulin lispro, 1-5 Units, Subcutaneous, TID AC  insulin lispro, 1-5 Units, Subcutaneous, HS  insulin lispro, 5 Units, Subcutaneous, TID With Meals  isosorbide mononitrate, 90 mg, Oral, Daily  metoprolol succinate, 100 mg, Oral, Daily  multivitamin-minerals, 1 tablet, Oral, Daily  pantoprazole, 40 mg, Oral, Daily Before Breakfast  pentoxifylline, 400 mg, Oral, TID With Meals  pregabalin, 150 mg, Oral, TID  ranolazine, 1,000 mg, Oral, BID  [Held by provider] torsemide, 20 mg, Oral, Daily  vancomycin 1,500 mg IV Q12H    Continuous IV Infusions:  multi-electrolyte, 75 mL/hr, Intravenous, Continuous    PRN Meds:  HYDROmorphone, 0.5 mg, Intravenous, Q4H PRN  nitroglycerin, 0.4 mg, Sublingual, Q5 Min PRN  oxyCODONE, 5 mg, Oral, Q12H PRN  vancomycin, 1,000 mg, Intravenous, Daily PRN      Weight (last 2 days)       None            Vital Signs (last 3  days)       Date/Time Temp Pulse Resp BP MAP (mmHg) SpO2 O2 Device Patient Position - Orthostatic VS Pain    01/06/25 07:30:37 -- 70 -- 114/58 77 91 % -- -- --    01/05/25 22:57:28 98.9 °F (37.2 °C) 71 -- 113/58 76 94 % -- -- --    01/05/25 2000 -- -- -- -- -- -- -- -- No Pain    01/05/25 1600 99.2 °F (37.3 °C) 83 18 121/87 98 91 % None (Room air) Lying --    01/05/25 13:17:08 99.3 °F (37.4 °C) 82 18 118/73 -- 88 % -- Lying --    01/05/25 0816 98.3 °F (36.8 °C) 67 -- 93/52 -- -- -- -- --    01/05/25 0815 -- -- -- -- -- 98 % None (Room air) -- No Pain    01/04/25 23:08:42 98.1 °F (36.7 °C) 69 -- 98/51 67 93 % -- -- --    01/04/25 2054 -- -- -- -- -- -- None (Room air) -- --    01/04/25 2053 -- -- -- -- -- -- -- -- 5    01/04/25 20:42:50 98.7 °F (37.1 °C) 77 -- 108/57 74 93 % -- -- --            Pertinent Labs/Diagnostic Test Results:   Radiology:  IR lower extremity angiogram    (Results Pending)       Results from last 7 days   Lab Units 01/06/25  0524 01/05/25  0647 01/04/25  0651 01/03/25  1459   WBC Thousand/uL 6.13 12.36* 13.98* 10.06   HEMOGLOBIN g/dL 11.6* 12.8 11.9* 14.3   HEMATOCRIT % 35.6* 39.5 36.4* 45.3   PLATELETS Thousands/uL 159 182 224 307   TOTAL NEUT ABS Thousands/µL 5.35  --   --  7.91*   BANDS PCT %  --  6  --   --        Results from last 7 days   Lab Units 01/06/25  0524 01/05/25  0647 01/04/25  0651 01/03/25  1459   SODIUM mmol/L 132* 134* 133* 135   POTASSIUM mmol/L 3.4* 3.7 3.8 4.5   CHLORIDE mmol/L 97 98 100 100   CO2 mmol/L 27 25 28 30   ANION GAP mmol/L 8 11 5 5   BUN mg/dL 44* 47* 42* 34*   CREATININE mg/dL 2.03* 2.75* 2.45* 1.73*   EGFR ml/min/1.73sq m 29 20 23 36   CALCIUM mg/dL 8.4 8.5 8.5 9.9   MAGNESIUM mg/dL 1.8*  --  1.5*  --    PHOSPHORUS mg/dL 2.3  --   --   --        Results from last 7 days   Lab Units 01/05/25  2155 01/05/25  1718 01/05/25  1215 01/05/25  0738 01/04/25  2121 01/04/25  1617 01/04/25  1053 01/04/25  0710 01/03/25  2312   POC GLUCOSE mg/dl 190* 108 304* 250* 181*  200* 211* 109 152*     Results from last 7 days   Lab Units 01/06/25  0524 01/05/25  0647 01/04/25  0651 01/03/25  1459   GLUCOSE RANDOM mg/dL 99 120 111 138         Results from last 7 days   Lab Units 01/03/25  1459   CRP mg/L 10.7*   SED RATE mm/hour 52*     Results from last 7 days   Lab Units 01/04/25  2217 01/03/25  2035 01/03/25  1513   BLOOD CULTURE  No Growth at 24 hrs.  No Growth at 24 hrs. Streptococcus parasanguinis*  Streptococcus parasanguinis*  Staphylococcus epidermidis*  --    GRAM STAIN RESULT   --  Gram positive cocci in chains*  Gram positive cocci in chains and clusters* No Polys or Bacteria seen   WOUND CULTURE   --   --  1+ Growth of Staphylococcus aureus*  1+ Growth of       Results from last 7 days   Lab Units 01/06/25  0524 01/05/25  0756   VANCOMYCIN RM ug/mL 13.8 26.1*       Past Medical History:   Diagnosis Date    Anemia     CAD (coronary artery disease)     Callus     Cancer ([facility])     prostate    CHF (congestive heart failure) ([facility])     Chronic kidney disease     Clotting disorder ([facility])     Coronary artery disease 1988    Deep vein thrombosis ([facility])     Diabetes mellitus ([facility])     Difficulty walking     Duodenal ulcer     Ear problems     Heart disease 1988    HL (hearing loss)     Hyperlipidemia     Hypertension     Myocardial infarction ([facility])     Neuropathy     Bilateral feet    Neuropathy in diabetes ([facility])     Plantar fasciitis     Sleep apnea     Could not tolerate CPAP     Present on Admission:   PAD (peripheral artery disease) ([facility])   Diabetic ulcer of right midfoot associated with diabetes mellitus due to underlying condition, limited to breakdown of skin ([facility])   Acute kidney injury superimposed on stage 3b chronic kidney disease ([facility])   Coronary artery disease involving native coronary artery of native heart without angina pectoris   Primary hypertension   Mixed hyperlipidemia   S/P placement of cardiac pacemaker   Chronic diastolic congestive heart failure  (HCC)      Admitting Diagnosis: Osteomyelitis (HCC) [M86.9]  Age/Sex: 79 y.o. male    Network Utilization Review Department  ATTENTION: Please call with any questions or concerns to [phone] and carefully listen to the prompts so that you are directed to the right person. All voicemails are confidential.   For Discharge needs, contact Care Management DC Support Team at [phone] opt. [phone]  Send all requests for admission clinical reviews, approved or denied determinations and any other requests to dedicated fax number below belonging to the campus where the patient is receiving treatment. List of dedicated fax numbers for the Facilities:  FACILITY NAME UR FAX NUMBER   ADMISSION DENIALS (Administrative/Medical Necessity) [phone]   DISCHARGE SUPPORT TEAM (NETWORK) [phone]   PARENT CHILD HEALTH (Maternity/NICU/Pediatrics) [phone]   [facility] [phone]   [facility] [phone]   [facility] [phone]   [facility] [phone]   [facility] [phone]   [facility] [phone]   [facility] [phone]   [facility] [phone]   [facility] [phone]   [facility] [phone]   [facility] [phone]   [facility] [phone]

## 2025-01-06 NOTE — ASSESSMENT & PLAN NOTE
Patient has very poor blood draw and IV access.  There is no growth of Streptococcus para sanguinous, in addition to MRSE in the 2 admission blood cultures drawn from same site.  Patient remains clinically and systemically well, without evidence of sepsis or systemic toxicity.  I still suspect that this is contaminant blood draw and not true bacteremia.  However, patient does have PPM in place.  Will need results to repeat blood culture to help make decision.  Patient was started on IV vancomycin.  For now, will continue IV vancomycin.  If repeat blood cultures have no growth, will consider discontinue antibiotic.  Follow-up repeat blood cultures.  Continue IV vancomycin for now.

## 2025-01-06 NOTE — PROGRESS NOTES
Progress Note - Infectious Disease   Name: Thomas Horne 79 y.o. male I MRN: [id number]  Unit/Bed#: [facility] 424-01 I Date of Admission: 1/4/2025   Date of Service: 1/6/2025 I Hospital Day: 2    Assessment & Plan  Staphylococcus epidermidis bacteremia  Patient has very poor blood draw and IV access.  There is no growth of Streptococcus para sanguinous, in addition to MRSE in the 2 admission blood cultures drawn from same site.  Patient remains clinically and systemically well, without evidence of sepsis or systemic toxicity.  I still suspect that this is contaminant blood draw and not true bacteremia.  However, patient does have PPM in place.  Will need results to repeat blood culture to help make decision.  Patient was started on IV vancomycin.  For now, will continue IV vancomycin.  If repeat blood cultures have no growth, will consider discontinue antibiotic.  Follow-up repeat blood cultures.  Continue IV vancomycin for now.   Diabetic ulcer of right midfoot associated with diabetes mellitus due to underlying condition, limited to breakdown of skin (HCC)  Ulcer appears to be ischemic on examination, without any signs of cellulitis.  Arteriogram is pending for next week.  No antibiotic needed for this indication.  Superficial wound culture is most likely skin colonization.  No antibiotic needed for this indication.  Serial foot exams.  PAD (peripheral artery disease) ([facility])  Patient has extensive and severe PAD, status post revascularization.  Vascular surgery evaluation noted, with plan for arteriogram noted.  Follow-up arteriogram findings.  Vascular surgery follow-up.  S/P placement of cardiac pacemaker  If patient does indeed have true bacteremia, there would be some concern for PPM infection.  However, at present, patient does not appear to have an active infection or true bacteremia, as in above.  Follow-up repeat blood cultures.  Acute kidney injury superimposed on stage 3b chronic kidney disease (HCC)  Lab  Results   Component Value Date    EGFR 29 01/06/2025    EGFR 20 01/05/2025    EGFR 23 01/04/2025    CREATININE 2.03 (H) 01/06/2025    CREATININE 2.75 (H) 01/05/2025    CREATININE 2.45 (H) 01/04/2025   Creatinine improving.  Antibiotic dosages adjusted accordingly.  Monitor creatinine.  Acute metabolic encephalopathy  This is likely multifactorial, secondary to IVY and foot/toe ischemia.  Mental status appears to be improved this morning.  Will defer any workup to primary service.  Monitor mental status.  Workup per primary service.  Type 2 diabetes mellitus with diabetic polyneuropathy, with long-term current use of insulin ([facility])  Lab Results   Component Value Date    HGBA1C 6.2 (H) 10/14/2024       Recent Labs     01/05/25  1718 01/05/25  2155 01/06/25  0806 01/06/25  1221   POCGLU 108 190* 111 138       Blood Sugar Average: Last 72 hrs:  (P) [id number]  This is risk factor for poor wound healing.  Management per primary service.    Discussed with patient in detail regarding the above plan.      Antibiotics:  Vancomycin # 4    Subjective   Patient feels well.  He only has stable mild discomfort in his right foot.  Temperature stays down.  No chills.  He is tolerating antibiotic well.  No nausea, vomiting or diarrhea.    Objective :  Temp:  [97.8 °F (36.6 °C)-99.2 °F (37.3 °C)] 97.8 °F (36.6 °C)  HR:  [70-83] 70  BP: (113-121)/(58-87) 114/58  Resp:  [18] 18  SpO2:  [91 %-94 %] 91 %  O2 Device: None (Room air)    General:  No acute distress  Psychiatric:  Awake and alert  Pulmonary:  Normal respiratory excursion without accessory muscle use  Abdomen:  Soft, nontender  Extremities: Stable leg edema.  Stable ischemic right great toe ulcer, with no purulence.  No warmth.  No tenderness.  Skin:  No rashes      Lab Results: I have reviewed the following results:  Results from last 7 days   Lab Units 01/06/25  0524 01/05/25  0647 01/04/25  0651   WBC Thousand/uL 6.13 12.36* 13.98*   HEMOGLOBIN g/dL 11.6* 12.8  11.9*   PLATELETS Thousands/uL 159 182 224     Results from last 7 days   Lab Units 01/06/25  0524 01/05/25  0647 01/04/25  0651   SODIUM mmol/L 132* 134* 133*   POTASSIUM mmol/L 3.4* 3.7 3.8   CHLORIDE mmol/L 97 98 100   CO2 mmol/L 27 25 28   BUN mg/dL 44* 47* 42*   CREATININE mg/dL 2.03* 2.75* 2.45*   EGFR ml/min/1.73sq m 29 20 23   CALCIUM mg/dL 8.4 8.5 8.5     Results from last 7 days   Lab Units 01/04/25  2217 01/03/25  2035 01/03/25  1513   BLOOD CULTURE  No Growth at 24 hrs.  No Growth at 24 hrs. Streptococcus parasanguinis*  Streptococcus parasanguinis*  Staphylococcus epidermidis*  --    GRAM STAIN RESULT   --  Gram positive cocci in chains*  Gram positive cocci in chains and clusters* No Polys or Bacteria seen   WOUND CULTURE   --   --  1+ Growth of Staphylococcus aureus*  1+ Growth of         Results from last 7 days   Lab Units 01/03/25  1459   CRP mg/L 10.7*

## 2025-01-06 NOTE — ASSESSMENT & PLAN NOTE
Follow-up with cardiology  Most recent echo from 12/13/2024 moderate to severe aortic stenosis, EF of 50%  Volume status appears stable, continue with gentle IV fluids

## 2025-01-06 NOTE — ASSESSMENT & PLAN NOTE
Family noting mild confusion 1/5/2025, no focal neuro deficits on exam  Likely multifactorial with component of hospital delirium  Delirium precautions, supportive cares, frequent reorientation

## 2025-01-06 NOTE — PROGRESS NOTES
NEPHROLOGY HOSPITAL PROGRESS NOTE   Thomas Horne 79 y.o. male MRN: [id number]  Unit/Bed#: [facility] 424-01 Encounter: [id number]  Reason for Consult: IVY    Assessment & Plan  Acute kidney injury superimposed on stage 3b chronic kidney disease (HCC)  IVY most likely secondary to ischemic injury secondary to hypotension plus prerenal azotemia in the presence of Jardiance and torsemide plus failure to autoregulate and in the presence of losartan some component of IC GN in light of bacteremia.  After review of records it appears that the patient has a baseline Creatinine of 1.6-1.8 mg/dL.  Renal function improving with creatinine 2.03.  Remains on IV fluids.  Primary hypertension  Optimize hemodynamic status to avoid renal ischemic injury.  Maintain MAP > 65mmHg  Avoid BP fluctuations.  Home medications: Torsemide 20 mg p.o. daily, Norvasc 2.5 mg p.o. daily, Jardiance 25 mg p.o. daily, losartan 25 mg p.o. daily, Toprol- mg p.o. daily, Imdur 90 mg p.o. daily,   Current medications: Norvasc 2.5 mg p.o. daily, Imdur 90 mg p.o. daily, Toprol- mg p.o. daily  Continue to hold loop diuretic therapy, angiotensin receptor blocker as well as Jardiance.  Coronary artery disease involving native coronary artery of native heart without angina pectoris  Follow-up with cardiology  Most recent echo from 12/13/2024 moderate to severe aortic stenosis, EF of 50%  Volume status appears stable, continue with gentle IV fluids  PAD (peripheral artery disease) ([facility])  Follow-up with vascular  Dissipated angiogram tomorrow.  Staphylococcus epidermidis bacteremia  As per infectious disease.  Chronic diastolic congestive heart failure (HCC)  Volume status currently appears stable, holding diuretic therapy.    Summary:  Renal function significantly improved  Assuming renal function remains stable/improved okay to proceed with angiogram  Continue to hold maintenance loop diuretic therapy, angiotensin receptor blocker as well as SGLT2  inhibitor.    SUBJECTIVE / 24H INTERVAL HISTORY:  Seen and examined.  Patient awake and alert.  Offers no new complaints.  Denies any chest pain shortness of breath.  Denies abdominal pain.    OBJECTIVE:  Current Weight:    Vitals:    01/05/25 1317 01/05/25 1600 01/05/25 2257 01/06/25 0730   BP: 118/73 121/87 113/58 114/58   BP Location: Right arm Right arm  Right arm   Pulse: 82 83 71 70   Resp: 18 18     Temp: 99.3 °F (37.4 °C) 99.2 °F (37.3 °C) 98.9 °F (37.2 °C) 97.8 °F (36.6 °C)   TempSrc: Oral Oral  Oral   SpO2: (!) 88% 91% 94% 91%       Intake/Output Summary (Last 24 hours) at 1/6/2025 1420  Last data filed at 1/6/2025 0900  Gross per 24 hour   Intake 1146.25 ml   Output 1250 ml   Net -103.75 ml       Physical Exam    Medications:    Current Facility-Administered Medications:     amLODIPine (NORVASC) tablet 2.5 mg, 2.5 mg, Oral, Daily, Felecia Louie MD    atorvastatin (LIPITOR) tablet 40 mg, 40 mg, Oral, HS, Felecia Louie MD, 40 mg at 01/05/25 2208    clopidogrel (PLAVIX) tablet 75 mg, 75 mg, Oral, Daily, Felecia Louie MD, 75 mg at 01/06/25 0906    DULoxetine (CYMBALTA) delayed release capsule 30 mg, 30 mg, Oral, Daily, Felecia Louie MD, 30 mg at 01/06/25 0906    fenofibrate (TRICOR) tablet 145 mg, 145 mg, Oral, Daily, Felecia Louie MD, 145 mg at 01/06/25 0906    fish oil capsule 1,000 mg, 1,000 mg, Oral, BID, Felecia Louie MD, 1,000 mg at 01/06/25 0906    heparin (porcine) subcutaneous injection 5,000 Units, 5,000 Units, Subcutaneous, Q8H CONSTANTINE, Felecia Louie MD, 5,000 Units at 01/06/25 1345    HYDROmorphone (DILAUDID) injection 0.5 mg, 0.5 mg, Intravenous, Q4H PRN, Felecia Louie MD    insulin glargine (LANTUS) subcutaneous injection 15 Units 0.15 mL, 15 Units, Subcutaneous, HS, Cinthya Sagastume PA-C    insulin lispro (HumALOG/ADMELOG) 100 units/mL subcutaneous injection 1-5 Units, 1-5 Units, Subcutaneous, TID AC, 3 Units at 01/05/25 1314 **AND** Fingerstick Glucose (POCT), , , TID  AC, Felecia Louie MD    insulin lispro (HumALOG/ADMELOG) 100 units/mL subcutaneous injection 1-5 Units, 1-5 Units, Subcutaneous, HS, Felecia Louie MD, 1 Units at 01/05/25 2208    insulin lispro (HumALOG/ADMELOG) 100 units/mL subcutaneous injection 5 Units, 5 Units, Subcutaneous, TID With Meals, Cinthya Sagastume PA-C, 5 Units at 01/06/25 1232    isosorbide mononitrate (IMDUR) 24 hr tablet 90 mg, 90 mg, Oral, Daily, Felecia Louie MD, 90 mg at 01/06/25 0906    metoprolol succinate (TOPROL-XL) 24 hr tablet 100 mg, 100 mg, Oral, Daily, Felecia Louie MD, 100 mg at 01/06/25 0906    multi-electrolyte (PLASMALYTE-A/ISOLYTE-S PH 7.4) IV solution, 75 mL/hr, Intravenous, Continuous, Briseyda Reagan MD, Last Rate: 75 mL/hr at 01/06/25 0259, 75 mL/hr at 01/06/25 0259    multivitamin-minerals (CENTRUM) tablet 1 tablet, 1 tablet, Oral, Daily, Felecia Louie MD, 1 tablet at 01/06/25 0906    nitroglycerin (NITROSTAT) SL tablet 0.4 mg, 0.4 mg, Sublingual, Q5 Min PRN, Felecia Louie MD    oxyCODONE (ROXICODONE) IR tablet 5 mg, 5 mg, Oral, Q12H PRN, Felecia Louie MD    pantoprazole (PROTONIX) EC tablet 40 mg, 40 mg, Oral, Daily Before Breakfast, Felecia Louie MD, 40 mg at 01/06/25 0522    pentoxifylline (TRENtal) ER tablet 400 mg, 400 mg, Oral, TID With Meals, Felecia Louie MD, 400 mg at 01/06/25 1231    pregabalin (LYRICA) capsule 150 mg, 150 mg, Oral, TID, Felecia Louie MD, 150 mg at 01/06/25 0906    ranolazine (RANEXA) 12 hr tablet 1,000 mg, 1,000 mg, Oral, BID, eFlecia Louie MD, 1,000 mg at 01/06/25 0906    [Held by provider] torsemide (DEMADEX) tablet 20 mg, 20 mg, Oral, Daily, Felecia Louie MD    vancomycin (VANCOCIN) IVPB (premix in dextrose) 1,000 mg 200 mL, 1,000 mg, Intravenous, Daily PRN, Cynthia Castorena MD    Laboratory Results:  Results from last 7 days   Lab Units 01/06/25  0524 01/05/25  0647 01/04/25  0651 01/03/25  1459   WBC Thousand/uL 6.13 12.36* 13.98* 10.06  "  HEMOGLOBIN g/dL 11.6* 12.8 11.9* 14.3   HEMATOCRIT % 35.6* 39.5 36.4* 45.3   PLATELETS Thousands/uL 159 182 224 307   POTASSIUM mmol/L 3.4* 3.7 3.8 4.5   CHLORIDE mmol/L 97 98 100 100   CO2 mmol/L 27 25 28 30   BUN mg/dL 44* 47* 42* 34*   CREATININE mg/dL 2.03* 2.75* 2.45* 1.73*   CALCIUM mg/dL 8.4 8.5 8.5 9.9   MAGNESIUM mg/dL 1.8*  --  1.5*  --    PHOSPHORUS mg/dL 2.3  --   --   --        Portions of the record may have been created with voice recognition software. Occasional wrong word or \"sound a like\" substitutions may have occurred due to the inherent limitations of voice recognition software. Read the chart carefully and recognize, using context, where substitutions have occurred.If you have any questions, please contact the dictating provider.  "

## 2025-01-06 NOTE — ASSESSMENT & PLAN NOTE
Ulcer appears to be ischemic on examination, without any signs of cellulitis.  Arteriogram is pending for next week.  No antibiotic needed for this indication.  Superficial wound culture is most likely skin colonization.  No antibiotic needed for this indication.  Serial foot exams.

## 2025-01-06 NOTE — ASSESSMENT & PLAN NOTE
Lab Results   Component Value Date    EGFR 29 01/06/2025    EGFR 20 01/05/2025    EGFR 23 01/04/2025    CREATININE 2.03 (H) 01/06/2025    CREATININE 2.75 (H) 01/05/2025    CREATININE 2.45 (H) 01/04/2025   Creatinine improving.  Antibiotic dosages adjusted accordingly.  Monitor creatinine.   SUBJECTIVE:   Verena Villalobos is a 48 year old female    for annual well woman exam.   Patient's last menstrual period was 2022.     Menstrual history: regular  GYN History:Current Contraception:none, Pap History:wnl  Date:  and Sexual History: not currently  Date of last mammogram: >2 years  Result of mammogram: Normal    Date of DEXA: never  Result of DEXA Scan: na    Date of last Colonoscopy:never   Result of Colonoscopy: na    GYNE ROS:   Genitourinary: mild stress incontinenceVaginal symptoms: none  Discharge described as: normal and physiologic.  Other associated symptoms: no      Does patient exercise? yes   How many times per week? 2    Was counseling given: no    Does patient desire TDAP vaccine today: No    Depression Screening:  Over the past 2 weeks, has patient felt down, depressed or hopeless? no  Over the past 2 weeks, has patient felt little interest or pleasure in doing things? no    On the basis of the above screen, the following is initiated:  Has issues with mood. Currently  from her     Social History:   Social History     Socioeconomic History   • Marital status: /Civil Union     Spouse name: Not on file   • Number of children: Not on file   • Years of education: Not on file   • Highest education level: Not on file   Occupational History   • Not on file   Tobacco Use   • Smoking status: Never   • Smokeless tobacco: Never   Substance and Sexual Activity   • Alcohol use: Not Currently   • Drug use: Never   • Sexual activity: Not Currently   Other Topics Concern   • Not on file   Social History Narrative   • Not on file     Social Determinants of Health     Financial Resource Strain: Not on file   Food Insecurity: Not on file   Transportation Needs: Not on file   Physical Activity: Not on file   Stress: Not on file   Social Connections: Not on file   Intimate Partner Violence: Not on file     Past Medical History:   Diagnosis Date   • C. difficile diarrhea       Past Surgical History:   Procedure Laterality Date   • No past surgeries       Family History: No family history on file.    Allergies:   ALLERGIES:   Allergen Reactions   • Clindamycin DIARRHEA       Current Outpatient Medications   Medication Sig Dispense Refill   • ELDERBERRY PO      • Multiple Vitamins-Minerals (MULTIVITAMIN ADULT) Tab      • Probiotic Product (ACIDOPHILUS/GOAT MILK) Cap      • Saccharomyces boulardii (PROBIOTIC) 250 MG Cap Take 250 mg by mouth.       No current facility-administered medications for this visit.         ROS  denies breast lumps, pain or nipple discharge   No headaches, no unexplained chest pain, dyspnea, SOB, abdominal pain, bowel complaints.  All other systems reviewed are negative.    PREVENTATIVE MEDICINE:    Does patient desire Immunizations: no  Last Lipids: No results found for: LDLHDL  Calcium Servings/day: 2-3  Flu shot: y  Covid vaccine: y    OBJECTIVE  Physical Exam  Vitals:    01/04/23 0843   BP: 128/72       Verena's BMI is Body mass index is 21.58 kg/m².,       CONSTITUTIONAL:    Appearance: well-nourished, well developed, alert, in no acute distress    HENT   Head: normocephalic, atraumatic     NECK    Thyroid: gland size normal, nontender, no nodules or masses present on palpation    CHEST   Respiratory effort: breathing unlabored   Auscultation: normal breath sounds, no rales, no rhonchi    CARDIOVASCULAR   Heart: regular rate, normal rhythm, no murmurs present    BREASTS   Inspection of Breasts: breasts symmetrical, no skin changes, no discharge present   Palpation of Breasts and Axillae: no masses present on palpation, no breast tenderness   Axillary Lymph Nodes: no lymphadenopathy present    GASTROINTESTINAL   Abdominal Examination: abdomen nontender to palpation, normal bowel sounds, tone normal without rigidity or guarding, no masses present, umbilicus without lesions   Liver and Spleen: no hepatomegaly present, liver nontender to palpation   Hernias: no  hernias present    GENITOURINARY   External Genitalia: normal appearance for age, no discharge present, no tenderness present, no inflammatory lesions present   Vagina: normal vaginal vault without central or paravaginal defects, no discharge present, no inflammatory lesions present, no masses present   Bladder: nontender to palpation   Urethral body: urethra palpation normal, urethra structural support normal   Cervix: appearance healthy, no lesions present, nontender to palpation, no bleeding present   Uterus: nontender to palpation, no masses present, position midline/midplane, mobility: normal   Adnexa: no adnexal tenderness present, no adnexal masses present   Perineum: perineum within normal limits, no evidence of trauma, no rashes or skin lesions present   Anus: anus within normal limits, no hemorrhoids present   Inguinal Lymph Nodes: no lymphadenopathy present        LYMPHATIC   Lymph Nodes: no other lymphadenopathy present    SKIN   General Inspection: no rashes present, no lesions present, no areas of discoloration    NEUROLOGIC/PSYCHIATRIC   Orientation: grossly oriented to person, place and time   Judgment and Insight: judgment and insight intact   Mood and Affect: mood normal, affect appropriate    ASSESSMENT/PLAN  Women's Health Annual Exam        - Health Care Maintenance: Pap smear obtained - yes , mammogram ordered -yes screening gc/chlamydia obtained - no  - Discussed calcium and vitamin D intake to prevent osteoporosis.   Recommended calcium fortified diet of at least 3 servings a day, Cardiovascular health being followed by PCP, Immunizations up to date , Pap results in 7-10 days, patient will be notified, patient instructed to call in 2 wks if no notification., Recommended mammograms yearly and Return for annual GYN exam in one year or earlier with any additional concerns.   -instructed on breast self exam  -recommended colonoscopy  - RTC in one year or sooner as needed.     Note to patient: The  21st Century Cares Act makes medical notes like these available to patients in the interest of transparency. However, be advised this is a medical document. It is intended as peer to peer communication. It is written in medical language and may contain abbreviations or verbiage that are unfamiliar. It may appear blunt or direct. Medical documents are intended to carry relevant information, facts as evident, and the clinical opinion of the practitioner.       Patient repeated all of the instructions and states she understands the plan of care.  Debbie Luis, FIDENCIO  Electronically signed by: Debbie HESS  Collaborating MD: Johanna Cook M.D.

## 2025-01-06 NOTE — ASSESSMENT & PLAN NOTE
This is likely multifactorial, secondary to IVY and foot/toe ischemia.  Mental status appears to be improved this morning.  Will defer any workup to primary service.  Monitor mental status.  Workup per primary service.

## 2025-01-06 NOTE — UTILIZATION REVIEW
NOTIFICATION OF ADMISSION DISCHARGE   This is a Notification of Discharge from [facility]. Please be advised that this patient has been discharge from our facility. Below you will find the admission and discharge date and time including the patient’s disposition.   UTILIZATION REVIEW CONTACT:  Helene Enriquez  Utilization   Network Utilization Review Department  Phone: [phone] x carefully listen to the prompts. All voicemails are confidential.  Email: [email or web address]     ADMISSION INFORMATION  PRESENTATION DATE: 1/3/2025  1:39 PM  OBERVATION ADMISSION DATE: N/A  INPATIENT ADMISSION DATE: 1/3/25  7:20 PM   DISCHARGE DATE: 1/4/2025  7:47 PM   DISPOSITION:Admitted as an inpatient to this hospital    Network Utilization Review Department  ATTENTION: Please call with any questions or concerns to [phone] and carefully listen to the prompts so that you are directed to the right person. All voicemails are confidential.   For Discharge needs, contact Care Management DC Support Team at [phone] opt. 2  Send all requests for admission clinical reviews, approved or denied determinations and any other requests to dedicated fax number below belonging to the campus where the patient is receiving treatment. List of dedicated fax numbers for the Facilities:  FACILITY NAME UR FAX NUMBER   ADMISSION DENIALS (Administrative/Medical Necessity) [phone]   DISCHARGE SUPPORT TEAM ([facility]) [phone]   PARENT CHILD HEALTH (Maternity/NICU/Pediatrics) [phone]   [facility] [phone]   [facility] [phone]   [facility] [phone]   [facility] [phone]   Critical access hospital [phone]   [facility] [phone]   [facility] [phone]   [facility]  [facility] [phone]   [facility] [phone]   [facility], [facility] [phone]   [facility] [phone]   [facility] [phone]

## 2025-01-06 NOTE — ASSESSMENT & PLAN NOTE
If patient does indeed have true bacteremia, there would be some concern for PPM infection.  However, at present, patient does not appear to have an active infection or true bacteremia, as in above.  Follow-up repeat blood cultures.

## 2025-01-06 NOTE — UTILIZATION REVIEW
"    Initial Clinical Review    Admission: Date/Time/Statement:   Admission Orders (From admission, onward)       Ordered        01/03/25 1920  INPATIENT ADMISSION  Once                          Orders Placed This Encounter   Procedures    INPATIENT ADMISSION     Standing Status:   Standing     Number of Occurrences:   1     Level of Care:   Med Surg [16]     Estimated length of stay:   More than 2 Midnights     Certification:   I certify that inpatient services are medically necessary for this patient for a duration of greater than two midnights. See H&P and MD Progress Notes for additional information about the patient's course of treatment.     ED Arrival Information       Expected   -    Arrival   1/3/2025 11:49    Acuity   Emergent              Means of arrival   Walk-In    Escorted by   Self    Service   Hospitalist    Admission type   Emergency              Arrival complaint   right foot toe problem             Chief Complaint   Patient presents with    Wound Infection - Complicated     States sent from Wound Care \" because I have a bone infection in my right great toe and the doctor said she would call \". Patient then states the doctor could not feel much of a pulse and there is not much blood flow \".        Initial Presentation: 79 y.o. male presents to ed on 1-3 from wound center for evaluation and treatment of non healing  right great toe wound. PMHX:  CHF, CKD, DM, HTN, MI, prostate CA, BLLE bypass, CABG.   Clinical assessment significant for temp 96.8  wound to right great toe ( see photos) able to probe to the bone.  Unable to palpate pedal pulses.  Foot remains warm and perfused.   Poor arterial flow to foot. Sed rate 52, Bun 34, Cr 1.73, Crp 10.7.  Imaging concerning for right great toe osteomyelitis.  Initially treated with iv vancomycin. Admit to inpatient med surg diabetic right foot ulcer, PAD.  Plan to consult ID , start iv ancef, local wound care. Due to new pacemaker will defer MRI and discuss " with podiatry.    Anticipated Length of Stay/Certification Statement:  Patient will be admitted on an inpatient basis with an anticipated length of stay of greater than 2 midnights secondary to nonhealing foot ulcer with peripheral arterial disease.     Date: 1-4-24    Day 2: inpatient med surg  1 of 2 blood cultures positive for gram + cocci.  Continue iv vancomycin, repeat blood cultures, consult ID.  Wound culture pending.  Creatinine increased 1.73> 2.45 ( baseline 1-1.6). monitor renal function closely on iv vancomycin.  Plan for A gram.       Discharged to higher level of care  presented to the hospital on 1/3/2025 due to nonhealing right big toe wound which started about 6 weeks ago.  Patient was admitted to the hospital for same and was started on IV Ancef.  Patient has known history of significant PAD and patient was admitted with vascular surgery in the ED and recommended transfer to [facility] for angiogram and possible bypass redo.  During hospital stay, patient with worsening kidney function and blood cultures 1 out of 2 are also positive.  Patient's diuretics were held and patient continued on gentle IV hydration.  Patient as needed vancomycin.  Patient to be transferred to [facility] for vascular surgery evaluation       ED Treatment-Medication Administration from 01/03/2025 1149 to 01/03/2025 2103         Date/Time Order Dose Route Action     01/03/2025 1514 vancomycin (VANCOCIN) 2,000 mg in sodium chloride 0.9 % 500 mL IVPB 2,000 mg Intravenous New Bag                   ED Triage Vitals   Temperature Pulse Respirations Blood Pressure SpO2 Pain Score   01/03/25 1241 01/03/25 1241 01/03/25 1241 01/03/25 1242 01/03/25 1242 01/03/25 1242   (!) 96.8 °F   (36 °C) 73 20 111/62 98 % No Pain     Weight (last 2 days) before discharge       Date/Time Weight    01/03/25 2124 99.9 (220.25)    01/03/25 1242 103 (227.2)            Vital Signs (last 3 days) before discharge       Date/Time Temp Pulse Resp  BP MAP (mmHg) SpO2 O2 Device Pain    01/04/25 1500 99 °F (37.2 °C) 64 -- 113/55 135 95 % -- --    01/04/25 1237 -- -- -- -- -- -- -- 7    01/04/25 1100 -- -- -- -- -- 96 % None (Room air) --    01/04/25 0700 97.3 °F (36.3 °C) 79 20 97/56 71 96 % None (Room air) 10 - Worst Possible Pain    01/04/25 0252 98.2 °F (36.8 °C) 73 20 98/50 68 96 % -- --    01/04/25 0150 -- -- -- -- -- -- -- 10 - Worst Possible Pain    01/03/25 2334 97.9 °F (36.6 °C) 68 18 102/53 69 96 % None (Room air) --    01/03/25 2124 97.9 °F (36.6 °C) 69 18 103/54 75 95 % None (Room air) --    01/03/25 2030 -- 65 18 113/55 79 94 % None (Room air) --    01/03/25 2000 -- 70 18 115/50 78 94 % None (Room air) --    01/03/25 1945 -- 71 18 141/64 92 90 % None (Room air) --    01/03/25 1800 -- 70 18 133/63 -- 99 % -- --    01/03/25 1527 -- 66 18 115/66 -- 98 % -- --    01/03/25 1242 -- -- -- 111/62 -- 98 % None (Room air) No Pain    01/03/25 1241 96.8 °F (36 °C) 73 20 -- -- -- -- --         Pertinent Labs/Diagnostic Test Results:                   Radiology:  XR foot 3+ views RIGHT   Final    (01/03 1544)      No acute osseous abnormality.   Ulcer distal great toe without underlying osseous changes to suggest osteomyelitis. Consider MRI for further evaluation as clinically dictated.           Cardiology:  No orders to display     GI:  No orders to display           Results from last 7 days   Lab Units 01/04/25  0651 01/03/25  1459   WBC Thousand/uL 13.98* 10.06   HEMOGLOBIN g/dL 11.9* 14.3   HEMATOCRIT % 36.4* 45.3   PLATELETS Thousands/uL 224 307   TOTAL NEUT ABS Thousands/µL  --  7.91*   BANDS PCT %  --   --          Results from last 7 days   Lab Units 01/04/25  0651 01/03/25  1459   SODIUM mmol/L 133* 135   POTASSIUM mmol/L 3.8 4.5   CHLORIDE mmol/L 100 100   CO2 mmol/L 28 30   ANION GAP mmol/L 5 5   BUN mg/dL 42* 34*   CREATININE mg/dL 2.45* 1.73*   EGFR ml/min/1.73sq m 23 36   CALCIUM mg/dL 8.5 9.9   MAGNESIUM mg/dL 1.5*  --          Results from last 7  days   Lab Units 01/04/25  1053 01/04/25  0710 01/03/25  2312   POC GLUCOSE mg/dl 211* 109 152*     Results from last 7 days   Lab Units 01/06/25  0524 01/05/25  0647 01/04/25  0651 01/03/25  1459   GLUCOSE RANDOM mg/dL 99 120 111 138         Results from last 7 days   Lab Units 01/03/25  1459   CRP mg/L 10.7*   SED RATE mm/hour 52*         Results from last 7 days   Lab Units 01/04/25  2217 01/03/25  2035 01/03/25  1513   BLOOD CULTURE  Received in Microbiology Lab. Culture in Progress.  Received in Microbiology Lab. Culture in Progress. Alpha Hemolytic Streptococcus*      Alpha Hemolytic Streptococcus*      Staphylococcus epidermidis*  --    GRAM STAIN RESULT   --  Gram positive cocci in chains*      Gram positive cocci in chains and clusters* No Polys or Bacteria seen   WOUND CULTURE   --   --  1+ Growth of Staphylococcus aureus*             Results from last 7 days   Lab Units 01/06/25  0524 01/05/25  0756   VANCOMYCIN RM ug/mL 13.8 26.1*       Past Medical History:   Diagnosis Date    Anemia     CAD (coronary artery disease)     Callus     Cancer (HCC)     prostate    CHF (congestive heart failure) (HCC)     Chronic kidney disease     Clotting disorder (HCC)     Coronary artery disease 1988    Deep vein thrombosis (HCC)     Diabetes mellitus (HCC)     Difficulty walking     Duodenal ulcer     Ear problems     Heart disease 1988    HL (hearing loss)     Hyperlipidemia     Hypertension     Myocardial infarction (HCC)     Neuropathy     Bilateral feet    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)     Plantar fasciitis     Sleep apnea     Could not tolerate CPAP     Present on Admission:   PAD (peripheral artery disease) (Formerly [facility])   Diabetic ulcer of toe of right foot associated with type 2 diabetes mellitus, with fat layer exposed (Former[facility])   Acute kidney injury superimposed on stage 3b chronic kidney disease (HCC)   Mixed hyperlipidemia   Primary hypertension   Coronary artery disease involving native coronary artery of native  heart without angina pectoris   Chronic diastolic congestive heart failure (HCC)   S/P placement of cardiac pacemaker   Ulcer of right foot (HCC)   Multiple gastric ulcers      Admitting Diagnosis:     Osteomyelitis (HCC) [M86.9]  Wound infection [T14.8XXA, L08.9]  Diabetic ulcer of right midfoot associated with diabetes mellitus due to underlying condition, limited to breakdown of skin (HCC) [E08.621, L97.411]  Age/Sex: 79 y.o. male    Network Utilization Review Department  ATTENTION: Please call with any questions or concerns to [phone] and carefully listen to the prompts so that you are directed to the right person. All voicemails are confidential.   For Discharge needs, contact Care Management DC Support Team at [phone] opt. 2  Send all requests for admission clinical reviews, approved or denied determinations and any other requests to dedicated fax number below belonging to the campus where the patient is receiving treatment. List of dedicated fax numbers for the Facilities:  FACILITY NAME UR FAX NUMBER   ADMISSION DENIALS (Administrative/Medical Necessity) [phone]   DISCHARGE SUPPORT TEAM (NETWORK) [phone]   PARENT CHILD HEALTH (Maternity/NICU/Pediatrics) [phone]   [facility] [phone]   [facility] [phone]   [facility] [phone]   [facility] [phone]   [facility] [phone]   [facility] [phone]   [facility] [phone]   [facility] [phone]   [facility] [phone]   [facility] [phone]   [facility] [phone]   [facility] [phone]        NOTIFICATION OF ADMISSION DISCHARGE   This is a Notification of  Discharge from [facility]. Please be advised that this patient has been discharge from our facility. Below you will find the admission and discharge date and time including the patient’s disposition.   UTILIZATION REVIEW CONTACT:  Helene Enriquez  Utilization   Network Utilization Review Department  Phone: [phone] x carefully listen to the prompts. All voicemails are confidential.  Email: [email or web address]     ADMISSION INFORMATION  PRESENTATION DATE: 1/3/2025  1:39 PM  OBERVATION ADMISSION DATE: N/A  INPATIENT ADMISSION DATE: 1/3/25  7:20 PM   DISCHARGE DATE: 1/4/2025  7:47 PM   DISPOSITION:Admitted as an inpatient to this [facility]    Network Utilization Review Department  ATTENTION: Please call with any questions or concerns to [phone] and carefully listen to the prompts so that you are directed to the right person. All voicemails are confidential.   For Discharge needs, contact Care Management DC Support Team at [phone] opt. 2  Send all requests for admission clinical reviews, approved or denied determinations and any other requests to dedicated fax number below belonging to the campus where the patient is receiving treatment. List of dedicated fax numbers for the Facilities:  FACILITY NAME UR FAX NUMBER   ADMISSION DENIALS (Administrative/Medical Necessity) [phone]   DISCHARGE SUPPORT TEAM ([facility]) [phone]   PARENT CHILD HEALTH (Maternity/NICU/Pediatrics) [phone]   [facility] [phone]   [facility] [phone]   [facility] [phone]   [facility] [phone]   [facility] [phone]   [facility] [phone]   [facility] [phone]   [facility] [phone]   [facility]  [phone]   [facility] [phone]   [facility] [phone]   [facility] [phone]

## 2025-01-06 NOTE — ASSESSMENT & PLAN NOTE
Optimize hemodynamic status to avoid renal ischemic injury.  Maintain MAP > 65mmHg  Avoid BP fluctuations.  Home medications: Torsemide 20 mg p.o. daily, Norvasc 2.5 mg p.o. daily, Jardiance 25 mg p.o. daily, losartan 25 mg p.o. daily, Toprol- mg p.o. daily, Imdur 90 mg p.o. daily,   Current medications: Norvasc 2.5 mg p.o. daily, Imdur 90 mg p.o. daily, Toprol- mg p.o. daily  Continue to hold loop diuretic therapy, angiotensin receptor blocker as well as Jardiance.

## 2025-01-06 NOTE — ASSESSMENT & PLAN NOTE
Lab Results   Component Value Date    HGBA1C 6.2 (H) 10/14/2024       Recent Labs     01/05/25  1718 01/05/25  2155 01/06/25  0806 01/06/25  1221   POCGLU 108 190* 111 138       Blood Sugar Average: Last 72 hrs:  (P) [id number]  This is risk factor for poor wound healing.  Management per primary service.    Discussed with patient in detail regarding the above plan.

## 2025-01-07 ENCOUNTER — APPOINTMENT (INPATIENT)
Dept: RADIOLOGY | Facility: HOSPITAL | Age: 80
DRG: 299 | End: 2025-01-07
Attending: STUDENT IN AN ORGANIZED HEALTH CARE EDUCATION/TRAINING PROGRAM
Payer: COMMERCIAL

## 2025-01-07 LAB
ANION GAP SERPL CALCULATED.3IONS-SCNC: 7 MMOL/L (ref 4–13)
ANION GAP SERPL CALCULATED.3IONS-SCNC: 9 MMOL/L (ref 4–13)
BUN SERPL-MCNC: 29 MG/DL (ref 5–25)
BUN SERPL-MCNC: 38 MG/DL (ref 5–25)
CALCIUM SERPL-MCNC: 8.3 MG/DL (ref 8.4–10.2)
CALCIUM SERPL-MCNC: 8.3 MG/DL (ref 8.4–10.2)
CHLORIDE SERPL-SCNC: 100 MMOL/L (ref 96–108)
CHLORIDE SERPL-SCNC: 102 MMOL/L (ref 96–108)
CO2 SERPL-SCNC: 27 MMOL/L (ref 21–32)
CO2 SERPL-SCNC: 28 MMOL/L (ref 21–32)
CREAT SERPL-MCNC: 1.32 MG/DL (ref 0.6–1.3)
CREAT SERPL-MCNC: 1.84 MG/DL (ref 0.6–1.3)
ERYTHROCYTE [DISTWIDTH] IN BLOOD BY AUTOMATED COUNT: 14.3 % (ref 11.6–15.1)
ERYTHROCYTE [DISTWIDTH] IN BLOOD BY AUTOMATED COUNT: 14.3 % (ref 11.6–15.1)
GFR SERPL CREATININE-BSD FRML MDRD: 33 ML/MIN/1.73SQ M
GFR SERPL CREATININE-BSD FRML MDRD: 50 ML/MIN/1.73SQ M
GLUCOSE SERPL-MCNC: 114 MG/DL (ref 65–140)
GLUCOSE SERPL-MCNC: 181 MG/DL (ref 65–140)
GLUCOSE SERPL-MCNC: 53 MG/DL (ref 65–140)
GLUCOSE SERPL-MCNC: 71 MG/DL (ref 65–140)
GLUCOSE SERPL-MCNC: 83 MG/DL (ref 65–140)
GLUCOSE SERPL-MCNC: 87 MG/DL (ref 65–140)
GLUCOSE SERPL-MCNC: 97 MG/DL (ref 65–140)
HCT VFR BLD AUTO: 36.9 % (ref 36.5–49.3)
HCT VFR BLD AUTO: 39.1 % (ref 36.5–49.3)
HGB BLD-MCNC: 11.5 G/DL (ref 12–17)
HGB BLD-MCNC: 12.4 G/DL (ref 12–17)
INR PPP: 1.19 (ref 0.85–1.19)
MCH RBC QN AUTO: 26.9 PG (ref 26.8–34.3)
MCH RBC QN AUTO: 27.3 PG (ref 26.8–34.3)
MCHC RBC AUTO-ENTMCNC: 31.2 G/DL (ref 31.4–37.4)
MCHC RBC AUTO-ENTMCNC: 31.7 G/DL (ref 31.4–37.4)
MCV RBC AUTO: 86 FL (ref 82–98)
MCV RBC AUTO: 86 FL (ref 82–98)
PLATELET # BLD AUTO: 152 THOUSANDS/UL (ref 149–390)
PLATELET # BLD AUTO: 152 THOUSANDS/UL (ref 149–390)
PMV BLD AUTO: 11.7 FL (ref 8.9–12.7)
PMV BLD AUTO: 11.9 FL (ref 8.9–12.7)
POTASSIUM SERPL-SCNC: 3.6 MMOL/L (ref 3.5–5.3)
POTASSIUM SERPL-SCNC: 3.7 MMOL/L (ref 3.5–5.3)
PROTHROMBIN TIME: 15.3 SECONDS (ref 12.3–15)
RBC # BLD AUTO: 4.28 MILLION/UL (ref 3.88–5.62)
RBC # BLD AUTO: 4.54 MILLION/UL (ref 3.88–5.62)
SODIUM SERPL-SCNC: 134 MMOL/L (ref 135–147)
SODIUM SERPL-SCNC: 139 MMOL/L (ref 135–147)
VANCOMYCIN SERPL-MCNC: 13.4 UG/ML (ref 10–20)
WBC # BLD AUTO: 5.5 THOUSAND/UL (ref 4.31–10.16)
WBC # BLD AUTO: 6.01 THOUSAND/UL (ref 4.31–10.16)

## 2025-01-07 PROCEDURE — C1769 GUIDE WIRE: HCPCS

## 2025-01-07 PROCEDURE — C1887 CATHETER, GUIDING: HCPCS

## 2025-01-07 PROCEDURE — C1725 CATH, TRANSLUMIN NON-LASER: HCPCS

## 2025-01-07 PROCEDURE — B41FYZZ FLUOROSCOPY OF RIGHT LOWER EXTREMITY ARTERIES USING OTHER CONTRAST: ICD-10-PCS | Performed by: STUDENT IN AN ORGANIZED HEALTH CARE EDUCATION/TRAINING PROGRAM

## 2025-01-07 PROCEDURE — 80202 ASSAY OF VANCOMYCIN: CPT | Performed by: INTERNAL MEDICINE

## 2025-01-07 PROCEDURE — RECHECK: Performed by: PHYSICIAN ASSISTANT

## 2025-01-07 PROCEDURE — C1894 INTRO/SHEATH, NON-LASER: HCPCS

## 2025-01-07 PROCEDURE — 99152 MOD SED SAME PHYS/QHP 5/>YRS: CPT | Performed by: STUDENT IN AN ORGANIZED HEALTH CARE EDUCATION/TRAINING PROGRAM

## 2025-01-07 PROCEDURE — 85027 COMPLETE CBC AUTOMATED: CPT | Performed by: PHYSICIAN ASSISTANT

## 2025-01-07 PROCEDURE — 36140 INTRO NDL ICATH UPR/LXTR ART: CPT

## 2025-01-07 PROCEDURE — 80048 BASIC METABOLIC PNL TOTAL CA: CPT | Performed by: PHYSICIAN ASSISTANT

## 2025-01-07 PROCEDURE — 85610 PROTHROMBIN TIME: CPT

## 2025-01-07 PROCEDURE — C1874 STENT, COATED/COV W/DEL SYS: HCPCS

## 2025-01-07 PROCEDURE — 76937 US GUIDE VASCULAR ACCESS: CPT | Performed by: STUDENT IN AN ORGANIZED HEALTH CARE EDUCATION/TRAINING PROGRAM

## 2025-01-07 PROCEDURE — 37226 HB FEM/POPL REVASC W/STENT: CPT

## 2025-01-07 PROCEDURE — 99152 MOD SED SAME PHYS/QHP 5/>YRS: CPT

## 2025-01-07 PROCEDURE — 82948 REAGENT STRIP/BLOOD GLUCOSE: CPT

## 2025-01-07 PROCEDURE — 99232 SBSQ HOSP IP/OBS MODERATE 35: CPT | Performed by: INTERNAL MEDICINE

## 2025-01-07 PROCEDURE — 047M352 DILATION OF RIGHT POPLITEAL ARTERY WITH TWO DRUG-ELUTING INTRALUMINAL DEVICES, SUSTAINED RELEASE, PERCUTANEOUS APPROACH: ICD-10-PCS | Performed by: STUDENT IN AN ORGANIZED HEALTH CARE EDUCATION/TRAINING PROGRAM

## 2025-01-07 PROCEDURE — 37226 PR REVSC OPN/PRQ FEM/POP W/STNT/ANGIOP SM VSL: CPT | Performed by: STUDENT IN AN ORGANIZED HEALTH CARE EDUCATION/TRAINING PROGRAM

## 2025-01-07 PROCEDURE — 80048 BASIC METABOLIC PNL TOTAL CA: CPT | Performed by: INTERNAL MEDICINE

## 2025-01-07 PROCEDURE — 75710 ARTERY X-RAYS ARM/LEG: CPT | Performed by: STUDENT IN AN ORGANIZED HEALTH CARE EDUCATION/TRAINING PROGRAM

## 2025-01-07 PROCEDURE — 99153 MOD SED SAME PHYS/QHP EA: CPT

## 2025-01-07 PROCEDURE — G0545 PR INHERENT VISIT TO INPT: HCPCS | Performed by: INTERNAL MEDICINE

## 2025-01-07 PROCEDURE — 85027 COMPLETE CBC AUTOMATED: CPT | Performed by: INTERNAL MEDICINE

## 2025-01-07 RX ORDER — IODIXANOL 320 MG/ML
50 INJECTION, SOLUTION INTRAVASCULAR
Status: COMPLETED | OUTPATIENT
Start: 2025-01-07 | End: 2025-01-07

## 2025-01-07 RX ORDER — VANCOMYCIN HYDROCHLORIDE 1 G/200ML
1000 INJECTION, SOLUTION INTRAVENOUS ONCE
Status: COMPLETED | OUTPATIENT
Start: 2025-01-07 | End: 2025-01-07

## 2025-01-07 RX ORDER — SODIUM CHLORIDE, SODIUM GLUCONATE, SODIUM ACETATE, POTASSIUM CHLORIDE, MAGNESIUM CHLORIDE, SODIUM PHOSPHATE, DIBASIC, AND POTASSIUM PHOSPHATE .53; .5; .37; .037; .03; .012; .00082 G/100ML; G/100ML; G/100ML; G/100ML; G/100ML; G/100ML; G/100ML
50 INJECTION, SOLUTION INTRAVENOUS CONTINUOUS
Status: DISPENSED | OUTPATIENT
Start: 2025-01-07 | End: 2025-01-07

## 2025-01-07 RX ORDER — FENTANYL CITRATE 50 UG/ML
INJECTION, SOLUTION INTRAMUSCULAR; INTRAVENOUS AS NEEDED
Status: COMPLETED | OUTPATIENT
Start: 2025-01-07 | End: 2025-01-07

## 2025-01-07 RX ORDER — LIDOCAINE WITH 8.4% SOD BICARB 0.9%(10ML)
SYRINGE (ML) INJECTION AS NEEDED
Status: COMPLETED | OUTPATIENT
Start: 2025-01-07 | End: 2025-01-07

## 2025-01-07 RX ORDER — MIDAZOLAM HYDROCHLORIDE 2 MG/2ML
INJECTION, SOLUTION INTRAMUSCULAR; INTRAVENOUS AS NEEDED
Status: COMPLETED | OUTPATIENT
Start: 2025-01-07 | End: 2025-01-07

## 2025-01-07 RX ORDER — HEPARIN SODIUM 1000 [USP'U]/ML
INJECTION, SOLUTION INTRAVENOUS; SUBCUTANEOUS AS NEEDED
Status: COMPLETED | OUTPATIENT
Start: 2025-01-07 | End: 2025-01-07

## 2025-01-07 RX ADMIN — PANTOPRAZOLE SODIUM 40 MG: 40 TABLET, DELAYED RELEASE ORAL at 05:58

## 2025-01-07 RX ADMIN — HEPARIN SODIUM 5000 UNITS: 5000 INJECTION, SOLUTION INTRAVENOUS; SUBCUTANEOUS at 05:58

## 2025-01-07 RX ADMIN — MIDAZOLAM 1 MG: 1 INJECTION INTRAMUSCULAR; INTRAVENOUS at 15:07

## 2025-01-07 RX ADMIN — FENTANYL CITRATE 25 MCG: 50 INJECTION INTRAMUSCULAR; INTRAVENOUS at 14:41

## 2025-01-07 RX ADMIN — CLOPIDOGREL BISULFATE 75 MG: 75 TABLET ORAL at 09:28

## 2025-01-07 RX ADMIN — OMEGA-3 FATTY ACIDS CAP 1000 MG 1000 MG: 1000 CAP at 18:40

## 2025-01-07 RX ADMIN — ATORVASTATIN CALCIUM 40 MG: 40 TABLET, FILM COATED ORAL at 23:00

## 2025-01-07 RX ADMIN — MIDAZOLAM 0.5 MG: 1 INJECTION INTRAMUSCULAR; INTRAVENOUS at 14:41

## 2025-01-07 RX ADMIN — PREGABALIN 150 MG: 75 CAPSULE ORAL at 18:40

## 2025-01-07 RX ADMIN — RANOLAZINE 1000 MG: 500 TABLET, FILM COATED, EXTENDED RELEASE ORAL at 18:41

## 2025-01-07 RX ADMIN — PENTOXIFYLLINE 400 MG: 400 TABLET, EXTENDED RELEASE ORAL at 18:41

## 2025-01-07 RX ADMIN — PENTOXIFYLLINE 400 MG: 400 TABLET, EXTENDED RELEASE ORAL at 09:28

## 2025-01-07 RX ADMIN — FENTANYL CITRATE 50 MCG: 50 INJECTION INTRAMUSCULAR; INTRAVENOUS at 14:18

## 2025-01-07 RX ADMIN — Medication 10 ML: at 14:28

## 2025-01-07 RX ADMIN — FENTANYL CITRATE 25 MCG: 50 INJECTION INTRAMUSCULAR; INTRAVENOUS at 16:59

## 2025-01-07 RX ADMIN — IODIXANOL 50 ML: 320 INJECTION, SOLUTION INTRAVASCULAR at 17:31

## 2025-01-07 RX ADMIN — RANOLAZINE 1000 MG: 500 TABLET, FILM COATED, EXTENDED RELEASE ORAL at 09:29

## 2025-01-07 RX ADMIN — HEPARIN SODIUM 8000 UNITS: 1000 INJECTION INTRAVENOUS; SUBCUTANEOUS at 15:58

## 2025-01-07 RX ADMIN — FENTANYL CITRATE 50 MCG: 50 INJECTION INTRAMUSCULAR; INTRAVENOUS at 15:07

## 2025-01-07 RX ADMIN — FENTANYL CITRATE 25 MCG: 50 INJECTION INTRAMUSCULAR; INTRAVENOUS at 14:53

## 2025-01-07 RX ADMIN — INSULIN LISPRO 1 UNITS: 100 INJECTION, SOLUTION INTRAVENOUS; SUBCUTANEOUS at 23:02

## 2025-01-07 RX ADMIN — IODIXANOL 10 ML: 320 INJECTION, SOLUTION INTRAVASCULAR at 17:31

## 2025-01-07 RX ADMIN — SODIUM CHLORIDE, SODIUM GLUCONATE, SODIUM ACETATE, POTASSIUM CHLORIDE, MAGNESIUM CHLORIDE, SODIUM PHOSPHATE, DIBASIC, AND POTASSIUM PHOSPHATE 50 ML/HR: .53; .5; .37; .037; .03; .012; .00082 INJECTION, SOLUTION INTRAVENOUS at 18:46

## 2025-01-07 RX ADMIN — PREGABALIN 150 MG: 75 CAPSULE ORAL at 09:28

## 2025-01-07 RX ADMIN — Medication 1 TABLET: at 09:28

## 2025-01-07 RX ADMIN — MIDAZOLAM 1 MG: 1 INJECTION INTRAMUSCULAR; INTRAVENOUS at 14:25

## 2025-01-07 RX ADMIN — OMEGA-3 FATTY ACIDS CAP 1000 MG 1000 MG: 1000 CAP at 09:28

## 2025-01-07 RX ADMIN — DULOXETINE HYDROCHLORIDE 30 MG: 30 CAPSULE, DELAYED RELEASE ORAL at 09:29

## 2025-01-07 RX ADMIN — INSULIN GLARGINE 15 UNITS: 100 INJECTION, SOLUTION SUBCUTANEOUS at 23:00

## 2025-01-07 RX ADMIN — MIDAZOLAM 0.5 MG: 1 INJECTION INTRAMUSCULAR; INTRAVENOUS at 15:43

## 2025-01-07 RX ADMIN — VANCOMYCIN HYDROCHLORIDE 1000 MG: 1 INJECTION, SOLUTION INTRAVENOUS at 10:59

## 2025-01-07 RX ADMIN — FENTANYL CITRATE 25 MCG: 50 INJECTION INTRAMUSCULAR; INTRAVENOUS at 15:43

## 2025-01-07 RX ADMIN — FENTANYL CITRATE 50 MCG: 50 INJECTION INTRAMUSCULAR; INTRAVENOUS at 14:25

## 2025-01-07 RX ADMIN — FENOFIBRATE 145 MG: 145 TABLET ORAL at 09:29

## 2025-01-07 RX ADMIN — MIDAZOLAM 1 MG: 1 INJECTION INTRAMUSCULAR; INTRAVENOUS at 14:18

## 2025-01-07 RX ADMIN — MIDAZOLAM 0.5 MG: 1 INJECTION INTRAMUSCULAR; INTRAVENOUS at 14:52

## 2025-01-07 RX ADMIN — HEPARIN SODIUM 5000 UNITS: 5000 INJECTION, SOLUTION INTRAVENOUS; SUBCUTANEOUS at 23:00

## 2025-01-07 RX ADMIN — PREGABALIN 150 MG: 75 CAPSULE ORAL at 23:00

## 2025-01-07 NOTE — NURSING NOTE
Pt came to the floor as a transfer from P4 around ,pt is alert and oriented,has pacemaker,wound on big toe,v/s stable

## 2025-01-07 NOTE — CASE MANAGEMENT
Case Management Assessment & Discharge Planning Note    Patient name Thomas Horne  Location /-01 MRN [id number]  : 1945 Date 2025       Current Admission Date: 2025  Current Admission Diagnosis:Diabetic ulcer of right midfoot associated with diabetes mellitus due to underlying condition, limited to breakdown of skin ([facility])   Patient Active Problem List    Diagnosis Date Noted Date Diagnosed    Staphylococcus epidermidis bacteremia 2025     Acute metabolic encephalopathy 2025     Bacteremia 2025     S/P placement of cardiac pacemaker 2024     Bradycardia 2024     Multiple open wounds of lower extremity 2024     Ulcer of right foot (HCC) 2024     SOB (shortness of breath) 2024     Nausea 2024     Elevated troponin 2024     History of DVT (deep vein thrombosis) 2024     Diabetic ulcer of toe of right foot associated with type 2 diabetes mellitus, with fat layer exposed ([facility]) 2024     Plantar fasciitis, right 07/10/2024     Acute deep vein thrombosis (DVT) of left peroneal vein ([facility]) 2024     Iron deficiency anemia 2024     Shortness of breath 2024     History of colon polyps 2024     Duodenal ulcer 2024     Multiple gastric ulcers 2023     Iron deficiency anemia due to chronic blood loss 2023     Melena 2023     Symptomatic anemia 2023     Frequent PVCs 2023     Chronic diastolic congestive heart failure (HCC) 2023     Acute kidney injury superimposed on stage 3b chronic kidney disease (HCC) 2023     Diabetic ulcer of right midfoot associated with diabetes mellitus due to underlying condition, limited to breakdown of skin ([facility]) 06/15/2023     Hypertensive urgency 2023     Elevated troponin level not due myocardial infarction 2023     Stable angina pectoris ([facility]) 2023     Chronic kidney disease-mineral and bone disorder  11/30/2022     Nonrheumatic aortic valve stenosis 11/11/2022     Gross hematuria 07/26/2022     Platelets decreased ([facility]) 07/22/2022     Acute kidney injury superimposed on chronic kidney disease  ([facility]) 02/16/2022     Actinic keratoses 01/14/2022     Type 2 diabetes mellitus with diabetic peripheral angiopathy without gangrene, with long-term current use of insulin ([facility]) 01/11/2022     Varicose veins of left lower extremity 06/25/2021     History of endovascular stent graft for abdominal aortic aneurysm (AAA) 06/25/2021     Bruit (arterial) 06/25/2021     PAD (peripheral artery disease) ([facility]) 06/25/2021     Type 2 diabetes mellitus with diabetic polyneuropathy, with long-term current use of insulin ([facility]) 06/10/2021     Mixed hyperlipidemia 05/11/2021     Primary hypertension 05/11/2021     Coronary artery disease involving native coronary artery of native heart without angina pectoris 05/11/2021     S/P angioplasty with stent 05/11/2021     Hx of CABG 05/11/2021     S/P AAA repair 05/11/2021     S/P prostatectomy 05/11/2021     H/O prostate cancer 05/11/2021       LOS (days): 3  Geometric Mean LOS (GMLOS) (days): 3.1  Days to GMLOS:0.6     OBJECTIVE:  PATIENT READMITTED TO HOSPITAL  Risk of Unplanned Readmission Score: 35.93         Current admission status: Inpatient       Preferred Pharmacy:   [facility] - [place], [place] - [address]  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   Primary Phone: [phone] (Mobile)                           Readmission Root Cause  30 Day Readmission: Yes  During your hospital stay, did someone (provider, nurse, ) explain your care to you in a way you could understand?: Yes  Did you feel medically stable to leave the hospital?: Yes  Did you have reliable transportation to take you to your appointments?: Yes  During previous admission, was a post-acute recommendation made?: No  Patient was readmitted due to: bacteremia  Action Plan: IV ATB    Patient Information  Admitted from:: Home  Mental Status: Alert                                   DISCHARGE DETAILS:    Discharge planning discussed with:: viral---see assessment completed 12/16  Freedom of Choice: Yes

## 2025-01-07 NOTE — ASSESSMENT & PLAN NOTE
Patient was sent to the ED at [facility] on 1/3/25 after wound care noted worsening of his right foot ulcer; note underlying history of PAD. Evaluated by vascular surgery in the ED and transferred to [facility] for angiogram and possible bypass redo.  Vascular surgery following, appreciate ongoing recommendations  IR consulted for A-gram, planned for 1/7  Podiatry following, continue local wound care   ID following, continuing vancomycin for now  Continue ASA, Plavix, statin  Analgesics as needed  PT/OT evaluations

## 2025-01-07 NOTE — ASSESSMENT & PLAN NOTE
Lab Results   Component Value Date    EGFR 33 01/07/2025    EGFR 29 01/06/2025    EGFR 20 01/05/2025    CREATININE 1.84 (H) 01/07/2025    CREATININE 2.03 (H) 01/06/2025    CREATININE 2.75 (H) 01/05/2025   Noted with rise in creatinine to 2.45 on 1/4/2025 increased from baseline of approximately 1.6-1.8  Cr peak of 2.7, now improved to 1.8  Appreciate nephrology consult and recommendations  Continue holding losartan/torsemide   Continue IV fluids for agram  Limit nephrotoxins and avoid hypotension  Monitor intake and output  Daily BMP

## 2025-01-07 NOTE — ASSESSMENT & PLAN NOTE
IVY most likely secondary to ischemic injury secondary to hypotension plus prerenal azotemia in the presence of Jardiance and torsemide plus failure to autoregulate and in the presence of losartan some component of IC GN in light of bacteremia.  After review of records it appears that the patient has a baseline Creatinine of 1.6-1.8 mg/dL.  Renal function improving with creatinine 1.8, remains on IV fluids.

## 2025-01-07 NOTE — BRIEF OP NOTE (RAD/CATH)
INTERVENTIONAL RADIOLOGY PROCEDURE NOTE    Date: 1/7/2025    Procedure:   Procedure Summary       Date:  Room / Location:     Anesthesia Start:  Anesthesia Stop:     Procedure:  Diagnosis:     Scheduled Providers:  Responsible Provider:     Anesthesia Type: Not recorded ASA Status: Not recorded            Preoperative diagnosis:   1. Bacteremia    2. Foot ulcer (HCC)    3. Ulcer of right foot, unspecified ulcer stage (HCC)    4. Chronic kidney disease-mineral and bone disorder         Postoperative diagnosis: Same.    Surgeon: Everett Cervantes MD     Assistant: None. No qualified resident was available.    Blood loss: Minimal    Specimens: None     Findings:     Right common femoral artery antegrade access.    Known severe cobblestone appearing SFA calcified atherosclerosis, with chronic total occlusion extending from mid SFA to popliteal artery P1 segment.  Reconstitution of the TP trunk and peroneal artery via collaterals, with subsequent collateral reconstitution of the DP and PT arteries at the level of the ankle.    The prior bypass graft had occluded and could not be identified.    Successful subintimal crossing of the fem-pop occlusion, re-entry into patent popliteal artery P1 segment with Outback device, and then angioplasty/stenting with 6 mm Usha stent construct post-dilated to 6 mm.  The peroneal artery remained patent and provided flow to the foot via collaterals to the DP/PT at the level of the ankle.    Access closed with 15 minutes manual compression.    Complications: None immediate.    Anesthesia: conscious sedation

## 2025-01-07 NOTE — ASSESSMENT & PLAN NOTE
Family noting mild confusion 1/5/2025, no focal neurodeficits on exam  Likely multifactorial with above conditions, with component of hospital delirium  Delirium precautions, supportive cares, frequent reorientation

## 2025-01-07 NOTE — PLAN OF CARE
Problem: PAIN - ADULT  Goal: Verbalizes/displays adequate comfort level or baseline comfort level  Description: Interventions:  - Encourage patient to monitor pain and request assistance  - Assess pain using appropriate pain scale  - Administer analgesics based on type and severity of pain and evaluate response  - Implement non-pharmacological measures as appropriate and evaluate response  - Consider cultural and social influences on pain and pain management  - Notify physician/advanced practitioner if interventions unsuccessful or patient reports new pain  Outcome: Progressing     Problem: INFECTION - ADULT  Goal: Absence or prevention of progression during hospitalization  Description: INTERVENTIONS:  - Assess and monitor for signs and symptoms of infection  - Monitor lab/diagnostic results  - Monitor all insertion sites, i.e. indwelling lines, tubes, and drains  - Monitor endotracheal if appropriate and nasal secretions for changes in amount and color  - [place] appropriate cooling/warming therapies per order  - Administer medications as ordered  - Instruct and encourage patient and family to use good hand hygiene technique  - Identify and instruct in appropriate isolation precautions for identified infection/condition  Outcome: Progressing  Goal: Absence of fever/infection during neutropenic period  Description: INTERVENTIONS:  - Monitor WBC    Outcome: Progressing     Problem: SAFETY ADULT  Goal: Patient will remain free of falls  Description: INTERVENTIONS:  - Educate patient/family on patient safety including physical limitations  - Instruct patient to call for assistance with activity   - Consult OT/PT to assist with strengthening/mobility   - Keep Call bell within reach  - Keep bed low and locked with side rails adjusted as appropriate  - Keep care items and personal belongings within reach  - Initiate and maintain comfort rounds  - Make Fall Risk Sign visible to staff  - Offer Toileting every 2 Hours,  in advance of need  - Initiate/Maintain bed alarm  - Obtain necessary fall risk management equipment  - Apply yellow socks and bracelet for high fall risk patients  - Consider moving patient to room near nurses station  Outcome: Progressing  Goal: Maintain or return to baseline ADL function  Description: INTERVENTIONS:  -  Assess patient's ability to carry out ADLs; assess patient's baseline for ADL function and identify physical deficits which impact ability to perform ADLs (bathing, care of mouth/teeth, toileting, grooming, dressing, etc.)  - Assess/evaluate cause of self-care deficits   - Assess range of motion  - Assess patient's mobility; develop plan if impaired  - Assess patient's need for assistive devices and provide as appropriate  - Encourage maximum independence but intervene and supervise when necessary  - Involve family in performance of ADLs  - Assess for home care needs following discharge   - Consider OT consult to assist with ADL evaluation and planning for discharge  - Provide patient education as appropriate  Outcome: Progressing  Goal: Maintains/Returns to pre admission functional level  Description: INTERVENTIONS:  - Perform AM-PAC 6 Click Basic Mobility/ Daily Activity assessment daily.  - Set and communicate daily mobility goal to care team and patient/family/caregiver.   - Collaborate with rehabilitation services on mobility goals if consulted  - Perform Range of Motion 4 times a day.  - Reposition patient every 2 hours.  - Dangle patient 4 times a day  - Stand patient 3 times a day  - Ambulate patient 3 times a day  - Out of bed to chair 3 times a day   - Out of bed for meals 3 times a day  - Out of bed for toileting  - Record patient progress and toleration of activity level   Outcome: Progressing     Problem: DISCHARGE PLANNING  Goal: Discharge to home or other facility with appropriate resources  Description: INTERVENTIONS:  - Identify barriers to discharge w/patient and caregiver  -  Arrange for needed discharge resources and transportation as appropriate  - Identify discharge learning needs (meds, wound care, etc.)  - Arrange for interpretive services to assist at discharge as needed  - Refer to Case Management Department for coordinating discharge planning if the patient needs post-hospital services based on physician/advanced practitioner order or complex needs related to functional status, cognitive ability, or social support system  Outcome: Progressing     Problem: Knowledge Deficit  Goal: Patient/family/caregiver demonstrates understanding of disease process, treatment plan, medications, and discharge instructions  Description: Complete learning assessment and assess knowledge base.  Interventions:  - Provide teaching at level of understanding  - Provide teaching via preferred learning methods  Outcome: Progressing     Problem: SKIN/TISSUE INTEGRITY - ADULT  Goal: Skin Integrity remains intact(Skin Breakdown Prevention)  Description: Assess:  -Perform Dev assessment  -Clean and moisturize skin  -Inspect skin when repositioning, toileting, and assisting with ADLS  -Assess under medical devices   -Assess extremities for adequate circulation and sensation     Bed Management:  -Have minimal linens on bed & keep smooth, unwrinkled  -Change linens as needed when moist or perspiring  -Avoid sitting or lying in one position for more than 2 hours while in bed  -Keep HOB at 30 degrees     Toileting:  -Offer bedside commode  -Assess for incontinence   -Use incontinent care products after each incontinent episode     Activity:  -Mobilize patient 4 times a day  -Encourage activity and walks on unit  -Encourage or provide ROM exercises   -Turn and reposition patient every 2 Hours  -Use appropriate equipment to lift or move patient in bed  -Instruct/ Assist with weight shifting  when out of bed in chair  -Consider limitation of chair time 2 hour intervals    Skin Care:  -Avoid use of baby powder,  tape, friction and shearing, hot water or constrictive clothing  -Relieve pressure over bony prominences  -Do not massage red bony areas    Next Steps:  -Teach patient strategies to minimize risks    -Consider consults to  interdisciplinary teams  Outcome: Progressing  Goal: Incision(s), wounds(s) or drain site(s) healing without S/S of infection  Description: INTERVENTIONS  - Assess and document dressing, incision, wound bed, drain sites and surrounding tissue  - Provide patient and family education  - Perform skin care/dressing changes  Outcome: Progressing  Goal: Pressure injury heals and does not worsen  Description: Interventions:  - Implement low air loss mattress or specialty surface (Criteria met)  - Apply silicone foam dressing  - Instruct/assist with weight shifting every 30 minutes when in chair   - Limit chair time to 2 hour intervals  - Use special pressure reducing interventions when in chair   - Apply fecal or urinary incontinence containment device   - Perform passive or active ROM  - Turn and reposition patient & offload bony prominences every 2 hours   - Utilize friction reducing device or surface for transfers   - Consider consults to  interdisciplinary teams  - Use incontinent care products after each incontinent episode   - Consider nutrition services referral as needed  Outcome: Progressing

## 2025-01-07 NOTE — PROGRESS NOTES
Progress Note - Hospitalist   Name: Thomas Horne 79 y.o. male I MRN: [id number]  Unit/Bed#: -01 I Date of Admission: 1/4/2025   Date of Service: 1/7/2025 I Hospital Day: 3    Assessment & Plan  Diabetic ulcer of right midfoot associated with diabetes mellitus due to underlying condition, limited to breakdown of skin (HCC)  Patient was sent to the ED at [facility] on 1/3/25 after wound care noted worsening of his right foot ulcer; note underlying history of PAD. Evaluated by vascular surgery in the ED and transferred to [facility] for angiogram and possible bypass redo.  Vascular surgery following, appreciate ongoing recommendations  IR consulted for A-gram, planned for 1/7  Podiatry following, continue local wound care   ID following, continuing vancomycin for now  Continue ASA, Plavix, statin  Analgesics as needed  PT/OT evaluations  Gram-positive bacteremia  2 of 2 blood cultures from 1/3/2025 positive for gram-positive cocci; culture with Staph epidermidis and also with Strep parasanguinis   Repeat blood cultures x 2 negative at 48 hrs  ID consult appreciated, suspect this may represent contaminant.  Since repeat cultures are negative may be able to d/c antibiotic, will defer to ID.   Acute kidney injury superimposed on stage 3b chronic kidney disease (HCC)  Lab Results   Component Value Date    EGFR 33 01/07/2025    EGFR 29 01/06/2025    EGFR 20 01/05/2025    CREATININE 1.84 (H) 01/07/2025    CREATININE 2.03 (H) 01/06/2025    CREATININE 2.75 (H) 01/05/2025   Noted with rise in creatinine to 2.45 on 1/4/2025 increased from baseline of approximately 1.6-1.8  Cr peak of 2.7, now improved to 1.8  Appreciate nephrology consult and recommendations  Continue holding losartan/torsemide   Continue IV fluids for agram  Limit nephrotoxins and avoid hypotension  Monitor intake and output  Daily BMP  Acute metabolic encephalopathy  Family noting mild confusion 1/5/2025, no focal neurodeficits on  exam  Likely multifactorial with above conditions, with component of hospital delirium  Delirium precautions, supportive cares, frequent reorientation  Type 2 diabetes mellitus with diabetic polyneuropathy, with long-term current use of insulin ([facility])  Lab Results   Component Value Date    HGBA1C 6.2 (H) 10/14/2024     Recent Labs     01/06/25  1729 01/06/25  2118 01/07/25  0736 01/07/25  1203   POCGLU 117 183* 97 114     Blood Sugar Average: Last 72 hrs:  (P) 163    Hold oral medications metformin, Jardiance, Januvia  Continued on home dose Lantus 15 units at bedtime + sliding scale insulin, continue mealtime insulin 5 units TID AC   Diabetic diet   Chronic diastolic congestive heart failure (HCC)  Wt Readings from Last 3 Encounters:   01/03/25 99.9 kg (220 lb 4 oz)   12/23/24 104 kg (230 lb)   12/23/24 104 kg (229 lb)   Echo (12/13/24) - EF 50%, abnormal diastolic function  Torsemide held and receiving gentle IV fluids per nephrology   Monitor volume status with daily weights, I&Os  PAD (peripheral artery disease) ([facility])  History of bilateral lower extremity bypass   Plan for A-gram as above  Coronary artery disease involving native coronary artery of native heart without angina pectoris  S/p CABG x 3 in 2002 and PCI with drug-eluting stent to distal left circumflex in 2021  Continue Plavix, statin, BB, Ranexa, Imdur  Primary hypertension  Continue home dose Amlodipine and Metoprolol   Losartan and Torsemide held in the setting of IVY  Monitor BP routinely   History of DVT (deep vein thrombosis)  Not on anticoagulation at home currently  Lower extremity Doppler done on 12/13/2024 showed no DVT  Heparin s/c for DVT prophylaxis  S/P placement of cardiac pacemaker  Recent PPM placement on 12/16/2024 noted  Mixed hyperlipidemia  Continue statin, fish oil, fenofibrate    VTE Pharmacologic Prophylaxis: VTE Score: 9 High Risk (Score >/= 5) - Pharmacological DVT Prophylaxis Ordered: heparin. Sequential Compression Devices  Ordered.    Mobility:   Basic Mobility Inpatient Raw Score: 15  JH-HLM Goal: 4: Move to chair/commode  JH-HLM Achieved: 4: Move to chair/commode  JH-HLM Goal achieved. Continue to encourage appropriate mobility.    Patient Centered Rounds: I performed bedside rounds with nursing staff today.   Discussions with Specialists or Other Care Team Provider:     Education and Discussions with Family / Patient:     Current Length of Stay: 3 day(s)  Current Patient Status: Inpatient   Certification Statement: The patient will continue to require additional inpatient hospital stay due to agram today, IVY, f/u vascular plan  Discharge Plan: Anticipate discharge in 48-72 hrs to home.    Code Status: Level 1 - Full Code    Subjective   Pt off the floor for agram at time of rounds; attempted to re-round he was still down at procedure.  He was not seen or examined by myself today.  Chart was reviewed.     Objective :  Temp:  [98.1 °F (36.7 °C)-101.6 °F (38.7 °C)] 98.1 °F (36.7 °C)  HR:  [66-75] 69  BP: ()/(54-81) 118/62  Resp:  [14-18] 16  SpO2:  [91 %-98 %] 98 %  O2 Device: Nasal cannula  Nasal Cannula O2 Flow Rate (L/min):  [2 L/min-6 L/min] 6 L/min  FiO2 (%):  [21-22] 21    There is no height or weight on file to calculate BMI.     Input and Output Summary (last 24 hours):     Intake/Output Summary (Last 24 hours) at 1/7/2025 1447  Last data filed at 1/7/2025 1300  Gross per 24 hour   Intake 1380.17 ml   Output 1050 ml   Net 330.17 ml         Lines/Drains:              Lab Results: I have reviewed the following results:   Results from last 7 days   Lab Units 01/07/25  0550 01/06/25  0524 01/05/25  0647   WBC Thousand/uL 5.50 6.13 12.36*   HEMOGLOBIN g/dL 11.5* 11.6* 12.8   HEMATOCRIT % 36.9 35.6* 39.5   PLATELETS Thousands/uL 152 159 182   BANDS PCT %  --   --  6   SEGS PCT %  --  87*  --    LYMPHO PCT %  --  5* 0*   MONO PCT %  --  5 0*   EOS PCT %  --  2 2     Results from last 7 days   Lab Units 01/07/25  0550   SODIUM  mmol/L 134*   POTASSIUM mmol/L 3.7   CHLORIDE mmol/L 100   CO2 mmol/L 27   BUN mg/dL 38*   CREATININE mg/dL 1.84*   ANION GAP mmol/L 7   CALCIUM mg/dL 8.3*   GLUCOSE RANDOM mg/dL 87     Results from last 7 days   Lab Units 01/07/25  0550   INR  1.19     Results from last 7 days   Lab Units 01/07/25  1203 01/07/25  0736 01/06/25  2118 01/06/25  1729 01/06/25  1221 01/06/25  0806 01/05/25  2155 01/05/25  1718 01/05/25  1215 01/05/25  0738 01/04/25  2121 01/04/25  1617   POC GLUCOSE mg/dl 114 97 183* 117 138 111 190* 108 304* 250* 181* 200*               Recent Cultures (last 7 days):   Results from last 7 days   Lab Units 01/04/25  2217 01/03/25  2035 01/03/25  1513   BLOOD CULTURE  No Growth at 48 hrs.  No Growth at 48 hrs. Streptococcus parasanguinis*  Streptococcus parasanguinis*  Staphylococcus epidermidis*  --    GRAM STAIN RESULT   --  Gram positive cocci in chains*  Gram positive cocci in chains and clusters* No Polys or Bacteria seen   WOUND CULTURE   --   --  1+ Growth of Staphylococcus aureus*  1+ Growth of       Imaging Results Review: No pertinent imaging studies reviewed.  Other Study Results Review: No additional pertinent studies reviewed.    Last 24 Hours Medication List:     Current Facility-Administered Medications:     acetaminophen (TYLENOL) tablet 975 mg, Q6H PRN    amLODIPine (NORVASC) tablet 2.5 mg, Daily    atorvastatin (LIPITOR) tablet 40 mg, HS    clopidogrel (PLAVIX) tablet 75 mg, Daily    DULoxetine (CYMBALTA) delayed release capsule 30 mg, Daily    fenofibrate (TRICOR) tablet 145 mg, Daily    fentaNYL injection, PRN    fish oil capsule 1,000 mg, BID    heparin (porcine) subcutaneous injection 5,000 Units, Q8H CONSTANTINE    HYDROmorphone (DILAUDID) injection 0.5 mg, Q4H PRN    insulin glargine (LANTUS) subcutaneous injection 15 Units 0.15 mL, HS    insulin lispro (HumALOG/ADMELOG) 100 units/mL subcutaneous injection 1-5 Units, TID AC **AND** Fingerstick Glucose (POCT), TID AC    insulin  lispro (HumALOG/ADMELOG) 100 units/mL subcutaneous injection 1-5 Units, HS    insulin lispro (HumALOG/ADMELOG) 100 units/mL subcutaneous injection 5 Units, TID With Meals    isosorbide mononitrate (IMDUR) 24 hr tablet 90 mg, Daily    lidocaine 1% buffered, PRN    metoprolol succinate (TOPROL-XL) 24 hr tablet 100 mg, Daily    midazolam (VERSED) injection, PRN    multi-electrolyte (PLASMALYTE-A/ISOLYTE-S PH 7.4) IV solution, Continuous, Last Rate: 50 mL/hr (01/06/25 1704)    multivitamin-minerals (CENTRUM) tablet 1 tablet, Daily    nitroglycerin (NITROSTAT) SL tablet 0.4 mg, Q5 Min PRN    oxyCODONE (ROXICODONE) IR tablet 5 mg, Q12H PRN    pantoprazole (PROTONIX) EC tablet 40 mg, Daily Before Breakfast    pentoxifylline (TRENtal) ER tablet 400 mg, TID With Meals    pregabalin (LYRICA) capsule 150 mg, TID    ranolazine (RANEXA) 12 hr tablet 1,000 mg, BID    [Held by provider] torsemide (DEMADEX) tablet 20 mg, Daily    vancomycin (VANCOCIN) IVPB (premix in dextrose) 1,000 mg 200 mL, Daily PRN    Administrative Statements   Today, Patient Was Seen By: Deepthi Salguero PA-C      **Please Note: This note may have been constructed using a voice recognition system.**

## 2025-01-07 NOTE — PROGRESS NOTES
Progress Note - Infectious Disease   Thomas Horne 79 y.o. male MRN: [id number]  Unit/Bed#: -01 Encounter: [id number]    Impression and recommendations have been discussed with my attending Dr. Martinez who will provide final attestation.     Impression/Recommendations:  1. Staphylococcus epidermidis bacteremia -patient with noted bacteremia on drawn at admission blood cultures drawn from the same site.  Patient remains clinically and systemically well, without evidence of sepsis or systemic toxicity.  This blood draw has been suspected to be contaminant and not true bacteremia.  Given patient does have pacemaker in place blood cultures were redrawn. Continues on IV vancomycin for now.    Plan:  Continue IV vancomycin  Repeat blood cultured without growth for 48H thus far. If remain negative at 72H will consider stopping antibiotics tomorrow with plan for repeat blood cultures in two weeks for surveillance and ID f/u.     2. Diabetic foot ulcer - Ulcer appears to be ischemic on examination, without any signs of cellulitis.  Arteriogram performed 1/7 with Vascular surgery. No antibiotic needed for this indication.  Superficial wound culture is most likely skin colonization.    3. Peripheral artery disease - S/p arteriogram 1/7. Follow vascular surgery findings and recommendations.     5. IVY on CKD IIIb  - Continue antibiotic dosing adjustments for IV Vancomycin. Creatinine continues to improve.     6. Type 2 diabetes mellitus - well controlled but is risk factor for poor wound healing. Management per primary service.     ID consult service will continue to follow.    Antibiotics:  IV Vancomycin D5    24 Hour events:  Yesterday and overnight notes reviewed. No acute events overnight.     Subjective:  Patient has no fever, chills, sweats; no nausea, vomiting, diarrhea; no cough, shortness of breath; no pain. No new symptoms. Feels overall well. Understanding of current plan of care. Arteriogram today with Vascular  Surgery.     Objective:  Vitals:  Temp:  [98.1 °F (36.7 °C)-101.6 °F (38.7 °C)] 98.1 °F (36.7 °C)  HR:  [66-75] 68  Resp:  [14-18] 14  BP: ()/(54-62) 108/62  SpO2:  [91 %-97 %] 96 %  Temp (24hrs), Av.8 °F (37.7 °C), Min:98.1 °F (36.7 °C), Max:101.6 °F (38.7 °C)  Current: Temperature: 98.1 °F (36.7 °C)    Physical Exam:   General Appearance:  Alert, interactive, nontoxic, no acute distress.   Throat: Oropharynx moist without lesions.    Lungs:   Clear to auscultation bilaterally; no wheezes, rhonchi or rales; respirations unlabored   Heart:  RRR; no murmur, rub or gallop   Abdomen:   Soft, non-tender, non-distended, positive bowel sounds.     Extremities: No clubbing, cyanosis or edema   Skin: No new rashes or lesions. No draining wounds noted.       Labs, Imaging, & Other studies:   All pertinent labs and imaging studies in PACS were personally reviewed as below.  Results from last 7 days   Lab Units 25  0550 25  0524 25  0647   WBC Thousand/uL 5.50 6.13 12.36*   HEMOGLOBIN g/dL 11.5* 11.6* 12.8   PLATELETS Thousands/uL 152 159 182     Results from last 7 days   Lab Units 25  0550   POTASSIUM mmol/L 3.7   CHLORIDE mmol/L 100   CO2 mmol/L 27   BUN mg/dL 38*   CREATININE mg/dL 1.84*   EGFR ml/min/1.73sq m 33   CALCIUM mg/dL 8.3*     Results from last 7 days   Lab Units 25  2217 25  2035 25  1513   BLOOD CULTURE  No Growth at 48 hrs.  No Growth at 48 hrs. Streptococcus parasanguinis*  Streptococcus parasanguinis*  Staphylococcus epidermidis*  --    GRAM STAIN RESULT   --  Gram positive cocci in chains*  Gram positive cocci in chains and clusters* No Polys or Bacteria seen   WOUND CULTURE   --   --  1+ Growth of Staphylococcus aureus*  1+ Growth of     Cinthya Francois DO  [facility]  Internal Medicine Residency, PGY-2

## 2025-01-07 NOTE — QUICK NOTE
Vascular Surgery    Post-procedure check:    S/P  RLE Agram antegrade access, subintimal crossing fem-pop occlusion, re-entry P1 segement w/ Outback device, PTA/Usha stenting POD#0    Pt recovering in room w/ family at bedside, doing well    AVSS    Right groin puncture CDI, no hematoma  Right foot warm, M/S intact  DP/PT doppler signals present

## 2025-01-07 NOTE — ASSESSMENT & PLAN NOTE
Lab Results   Component Value Date    HGBA1C 6.2 (H) 10/14/2024     Recent Labs     01/06/25  1729 01/06/25  2118 01/07/25  0736 01/07/25  1203   POCGLU 117 183* 97 114     Blood Sugar Average: Last 72 hrs:  (P) 163    Hold oral medications metformin, Jardiance, Januvia  Continued on home dose Lantus 15 units at bedtime + sliding scale insulin, continue mealtime insulin 5 units TID AC   Diabetic diet

## 2025-01-07 NOTE — PROGRESS NOTES
Thomas Horne is a 79 y.o. male who is currently ordered Vancomycin IV with management by the Pharmacy Consult service.  Relevant clinical data and objective / subjective history reviewed.  Vancomycin Assessment:  Indication and Goal AUC/Trough: Bacteremia (goal -600, trough >10)  Clinical Status: stable  Micro:     Renal Function:  SCr: 1.84 mg/dL  CrCl: 39.8 mL/min  Renal replacement: Not on dialysis  Days of Therapy: 5  Current Dose: 1000mg IV daily prn when level < 15  Vancomycin Plan:  New Dosing: continue current dosing  Estimated AUC: N/A  Estimated Trough: N/A  Next Level: Random 1/8 at 0600  Renal Function Monitoring: Daily BMP and UOP  Pharmacy will continue to follow closely for s/sx of nephrotoxicity, infusion reactions and appropriateness of therapy.  BMP and CBC will be ordered per protocol. We will continue to follow the patient’s culture results and clinical progress daily.    Marla Mart, Pharmacist

## 2025-01-07 NOTE — SEDATION DOCUMENTATION
IR RLE angiogram with intervention performed by . Patient tolerated procedure well. R groin closed with manual pressure and dry dressing placed on site. Report called to floor RN and bed rest start time 1730.

## 2025-01-07 NOTE — ASSESSMENT & PLAN NOTE
2 of 2 blood cultures from 1/3/2025 positive for gram-positive cocci; culture with Staph epidermidis and also with Strep parasanguinis   Repeat blood cultures x 2 negative at 48 hrs  ID consult appreciated, suspect this may represent contaminant.  Since repeat cultures are negative may be able to d/c antibiotic, will defer to ID.

## 2025-01-07 NOTE — PROGRESS NOTES
NEPHROLOGY HOSPITAL PROGRESS NOTE   Thomas Horne 79 y.o. male MRN: [id number]  Unit/Bed#: -01 Encounter: [id number]  Reason for Consult: IVY    Assessment & Plan  Acute kidney injury superimposed on stage 3b chronic kidney disease (HCC)  IVY most likely secondary to ischemic injury secondary to hypotension plus prerenal azotemia in the presence of Jardiance and torsemide plus failure to autoregulate and in the presence of losartan some component of IC GN in light of bacteremia.  After review of records it appears that the patient has a baseline Creatinine of 1.6-1.8 mg/dL.  Renal function improving with creatinine 1.8, remains on IV fluids.  Primary hypertension  Optimize hemodynamic status to avoid renal ischemic injury.  Maintain MAP > 65mmHg  Avoid BP fluctuations.  Home medications: Torsemide 20 mg p.o. daily, Norvasc 2.5 mg p.o. daily, Jardiance 25 mg p.o. daily, losartan 25 mg p.o. daily, Toprol- mg p.o. daily, Imdur 90 mg p.o. daily,   Current medications: Norvasc 2.5 mg p.o. daily, Imdur 90 mg p.o. daily, Toprol- mg p.o. daily  Continue to hold loop diuretic therapy, angiotensin receptor blocker as well as Jardiance.  Coronary artery disease involving native coronary artery of native heart without angina pectoris  Follow-up with cardiology  Most recent echo from 12/13/2024 moderate to severe aortic stenosis, EF of 50%  Volume status appears stable, continue with gentle IV fluids  PAD (peripheral artery disease) ([facility])  Follow-up with vascular  Anticipated angiogram today.  Staphylococcus epidermidis bacteremia  As per infectious disease.  Chronic diastolic congestive heart failure (HCC)  Volume status currently appears stable, holding diuretic therapy.    Summary:  Renal function overall appears to be stable with creatinine 1.8  Anticipated angiogram today.  Recommend discontinue IV fluids 4 to 6 hours post angiogram.    SUBJECTIVE / 24H INTERVAL HISTORY:  1.  Patient awake and alert.   Offers no new complaints.  Denies any chest pain or shortness of breath.  Denies abdominal bloating.  Denies any worsening lower extremity swelling.    OBJECTIVE:  Current Weight:    Vitals:    01/06/25 2225 01/07/25 0202 01/07/25 0738 01/07/25 0930   BP: 105/55 93/54 108/62    BP Location:       Pulse: 66 70 68    Resp: 18  14    Temp:   98.1 °F (36.7 °C)    TempSrc:       SpO2: 91% 97% 96% 96%       Intake/Output Summary (Last 24 hours) at 1/7/2025 1229  Last data filed at 1/7/2025 0636  Gross per 24 hour   Intake 1380.17 ml   Output 1225 ml   Net 155.17 ml       Physical Exam  Constitutional:       Appearance: He is not ill-appearing.   Cardiovascular:      Rate and Rhythm: Normal rate and regular rhythm.   Pulmonary:      Effort: Pulmonary effort is normal.      Breath sounds: Normal breath sounds.   Abdominal:      General: There is no distension.      Palpations: Abdomen is soft.   Musculoskeletal:      Right lower leg: No edema.      Left lower leg: No edema.   Skin:     General: Skin is warm and dry.      Findings: No rash.   Neurological:      Mental Status: He is alert and oriented to person, place, and time.         Medications:    Current Facility-Administered Medications:     acetaminophen (TYLENOL) tablet 975 mg, 975 mg, Oral, Q6H PRN, Belkis Howard PA-C, 975 mg at 01/06/25 2049    amLODIPine (NORVASC) tablet 2.5 mg, 2.5 mg, Oral, Daily, Felecia Louie MD    atorvastatin (LIPITOR) tablet 40 mg, 40 mg, Oral, HS, Felecia Louie MD, 40 mg at 01/06/25 2104    clopidogrel (PLAVIX) tablet 75 mg, 75 mg, Oral, Daily, Felecia Louie MD, 75 mg at 01/07/25 0928    DULoxetine (CYMBALTA) delayed release capsule 30 mg, 30 mg, Oral, Daily, Felecia Louie MD, 30 mg at 01/07/25 0929    fenofibrate (TRICOR) tablet 145 mg, 145 mg, Oral, Daily, Felecia Louie MD, 145 mg at 01/07/25 0929    fish oil capsule 1,000 mg, 1,000 mg, Oral, BID, Felecia Louie MD, 1,000 mg at 01/07/25 0928    heparin  (porcine) subcutaneous injection 5,000 Units, 5,000 Units, Subcutaneous, Q8H CONSATNTINE, Felecia Louie MD, 5,000 Units at 01/07/25 0558    HYDROmorphone (DILAUDID) injection 0.5 mg, 0.5 mg, Intravenous, Q4H PRN, Felecia Louie MD    insulin glargine (LANTUS) subcutaneous injection 15 Units 0.15 mL, 15 Units, Subcutaneous, HS, Cinthya Sagastume PA-C, 15 Units at 01/06/25 2104    insulin lispro (HumALOG/ADMELOG) 100 units/mL subcutaneous injection 1-5 Units, 1-5 Units, Subcutaneous, TID AC, 3 Units at 01/05/25 1314 **AND** Fingerstick Glucose (POCT), , , TID AC, Felecia Louie MD    insulin lispro (HumALOG/ADMELOG) 100 units/mL subcutaneous injection 1-5 Units, 1-5 Units, Subcutaneous, HS, Felecia Louie MD, 1 Units at 01/06/25 2219    insulin lispro (HumALOG/ADMELOG) 100 units/mL subcutaneous injection 5 Units, 5 Units, Subcutaneous, TID With Meals, Cinthya Sagastume PA-C, 5 Units at 01/06/25 1658    isosorbide mononitrate (IMDUR) 24 hr tablet 90 mg, 90 mg, Oral, Daily, Felecia Louie MD, 90 mg at 01/06/25 0906    metoprolol succinate (TOPROL-XL) 24 hr tablet 100 mg, 100 mg, Oral, Daily, Felecia Louie MD, 100 mg at 01/06/25 0906    multi-electrolyte (PLASMALYTE-A/ISOLYTE-S PH 7.4) IV solution, 50 mL/hr, Intravenous, Continuous, Torey Morrell DO, Last Rate: 50 mL/hr at 01/06/25 1704, 50 mL/hr at 01/06/25 1704    multivitamin-minerals (CENTRUM) tablet 1 tablet, 1 tablet, Oral, Daily, Felecia Louie MD, 1 tablet at 01/07/25 0928    nitroglycerin (NITROSTAT) SL tablet 0.4 mg, 0.4 mg, Sublingual, Q5 Min PRN, Felecia Louie MD    oxyCODONE (ROXICODONE) IR tablet 5 mg, 5 mg, Oral, Q12H PRN, Felecia Louie MD, 5 mg at 01/06/25 2049    pantoprazole (PROTONIX) EC tablet 40 mg, 40 mg, Oral, Daily Before Breakfast, Felecia Louie MD, 40 mg at 01/07/25 0558    pentoxifylline (TRENtal) ER tablet 400 mg, 400 mg, Oral, TID With Meals, Felecia Louie MD, 400 mg at 01/07/25 0928    pregabalin (LYRICA)  "capsule 150 mg, 150 mg, Oral, TID, Felecia Louie MD, 150 mg at 01/07/25 0928    ranolazine (RANEXA) 12 hr tablet 1,000 mg, 1,000 mg, Oral, BID, Felecia Louie MD, 1,000 mg at 01/07/25 0929    [Held by provider] torsemide (DEMADEX) tablet 20 mg, 20 mg, Oral, Daily, Felecia Louie MD    vancomycin (VANCOCIN) IVPB (premix in dextrose) 1,000 mg 200 mL, 1,000 mg, Intravenous, Daily PRN, Cynthia Castorena MD    Laboratory Results:  Results from last 7 days   Lab Units 01/07/25  0550 01/06/25  0524 01/05/25  0647 01/04/25  0651 01/03/25  1459   WBC Thousand/uL 5.50 6.13 12.36* 13.98* 10.06   HEMOGLOBIN g/dL 11.5* 11.6* 12.8 11.9* 14.3   HEMATOCRIT % 36.9 35.6* 39.5 36.4* 45.3   PLATELETS Thousands/uL 152 159 182 224 307   POTASSIUM mmol/L 3.7 3.4* 3.7 3.8 4.5   CHLORIDE mmol/L 100 97 98 100 100   CO2 mmol/L 27 27 25 28 30   BUN mg/dL 38* 44* 47* 42* 34*   CREATININE mg/dL 1.84* 2.03* 2.75* 2.45* 1.73*   CALCIUM mg/dL 8.3* 8.4 8.5 8.5 9.9   MAGNESIUM mg/dL  --  1.8*  --  1.5*  --    PHOSPHORUS mg/dL  --  2.3  --   --   --        Portions of the record may have been created with voice recognition software. Occasional wrong word or \"sound a like\" substitutions may have occurred due to the inherent limitations of voice recognition software. Read the chart carefully and recognize, using context, where substitutions have occurred.If you have any questions, please contact the dictating provider.  "

## 2025-01-08 ENCOUNTER — APPOINTMENT (INPATIENT)
Dept: NON INVASIVE DIAGNOSTICS | Facility: HOSPITAL | Age: 80
DRG: 299 | End: 2025-01-08
Payer: COMMERCIAL

## 2025-01-08 LAB
ANION GAP SERPL CALCULATED.3IONS-SCNC: 8 MMOL/L (ref 4–13)
BUN SERPL-MCNC: 28 MG/DL (ref 5–25)
CALCIUM SERPL-MCNC: 8.1 MG/DL (ref 8.4–10.2)
CHLORIDE SERPL-SCNC: 103 MMOL/L (ref 96–108)
CO2 SERPL-SCNC: 27 MMOL/L (ref 21–32)
CREAT SERPL-MCNC: 1.15 MG/DL (ref 0.6–1.3)
ERYTHROCYTE [DISTWIDTH] IN BLOOD BY AUTOMATED COUNT: 14.4 % (ref 11.6–15.1)
GFR SERPL CREATININE-BSD FRML MDRD: 59 ML/MIN/1.73SQ M
GLUCOSE SERPL-MCNC: 144 MG/DL (ref 65–140)
GLUCOSE SERPL-MCNC: 152 MG/DL (ref 65–140)
GLUCOSE SERPL-MCNC: 79 MG/DL (ref 65–140)
GLUCOSE SERPL-MCNC: 82 MG/DL (ref 65–140)
GLUCOSE SERPL-MCNC: 91 MG/DL (ref 65–140)
HCT VFR BLD AUTO: 36 % (ref 36.5–49.3)
HGB BLD-MCNC: 11.4 G/DL (ref 12–17)
MCH RBC QN AUTO: 27 PG (ref 26.8–34.3)
MCHC RBC AUTO-ENTMCNC: 31.7 G/DL (ref 31.4–37.4)
MCV RBC AUTO: 85 FL (ref 82–98)
PLATELET # BLD AUTO: 161 THOUSANDS/UL (ref 149–390)
PMV BLD AUTO: 12.4 FL (ref 8.9–12.7)
POTASSIUM SERPL-SCNC: 3.7 MMOL/L (ref 3.5–5.3)
RBC # BLD AUTO: 4.22 MILLION/UL (ref 3.88–5.62)
SODIUM SERPL-SCNC: 138 MMOL/L (ref 135–147)
VANCOMYCIN SERPL-MCNC: 12.6 UG/ML (ref 10–20)
WBC # BLD AUTO: 6.31 THOUSAND/UL (ref 4.31–10.16)

## 2025-01-08 PROCEDURE — 99232 SBSQ HOSP IP/OBS MODERATE 35: CPT | Performed by: PHYSICIAN ASSISTANT

## 2025-01-08 PROCEDURE — 80048 BASIC METABOLIC PNL TOTAL CA: CPT | Performed by: PHYSICIAN ASSISTANT

## 2025-01-08 PROCEDURE — 93923 UPR/LXTR ART STDY 3+ LVLS: CPT

## 2025-01-08 PROCEDURE — G0545 PR INHERENT VISIT TO INPT: HCPCS | Performed by: INTERNAL MEDICINE

## 2025-01-08 PROCEDURE — 93923 UPR/LXTR ART STDY 3+ LVLS: CPT | Performed by: SURGERY

## 2025-01-08 PROCEDURE — NC001 PR NO CHARGE: Performed by: SURGERY

## 2025-01-08 PROCEDURE — 85027 COMPLETE CBC AUTOMATED: CPT | Performed by: PHYSICIAN ASSISTANT

## 2025-01-08 PROCEDURE — 82948 REAGENT STRIP/BLOOD GLUCOSE: CPT

## 2025-01-08 PROCEDURE — 99232 SBSQ HOSP IP/OBS MODERATE 35: CPT | Performed by: PODIATRIST

## 2025-01-08 PROCEDURE — 99232 SBSQ HOSP IP/OBS MODERATE 35: CPT | Performed by: INTERNAL MEDICINE

## 2025-01-08 PROCEDURE — 80202 ASSAY OF VANCOMYCIN: CPT | Performed by: INTERNAL MEDICINE

## 2025-01-08 RX ORDER — TORSEMIDE 20 MG/1
20 TABLET ORAL DAILY
Status: DISCONTINUED | OUTPATIENT
Start: 2025-01-08 | End: 2025-01-09 | Stop reason: HOSPADM

## 2025-01-08 RX ORDER — ASPIRIN 81 MG/1
81 TABLET, CHEWABLE ORAL DAILY
Status: DISCONTINUED | OUTPATIENT
Start: 2025-01-08 | End: 2025-01-09 | Stop reason: HOSPADM

## 2025-01-08 RX ADMIN — ASPIRIN 81 MG CHEWABLE TABLET 81 MG: 81 TABLET CHEWABLE at 09:03

## 2025-01-08 RX ADMIN — Medication 1 TABLET: at 09:03

## 2025-01-08 RX ADMIN — AMLODIPINE BESYLATE 2.5 MG: 2.5 TABLET ORAL at 09:03

## 2025-01-08 RX ADMIN — PANTOPRAZOLE SODIUM 40 MG: 40 TABLET, DELAYED RELEASE ORAL at 09:08

## 2025-01-08 RX ADMIN — RANOLAZINE 1000 MG: 500 TABLET, FILM COATED, EXTENDED RELEASE ORAL at 17:41

## 2025-01-08 RX ADMIN — INSULIN LISPRO 5 UNITS: 100 INJECTION, SOLUTION INTRAVENOUS; SUBCUTANEOUS at 09:06

## 2025-01-08 RX ADMIN — PENTOXIFYLLINE 400 MG: 400 TABLET, EXTENDED RELEASE ORAL at 09:04

## 2025-01-08 RX ADMIN — INSULIN GLARGINE 15 UNITS: 100 INJECTION, SOLUTION SUBCUTANEOUS at 21:58

## 2025-01-08 RX ADMIN — METOPROLOL SUCCINATE 100 MG: 100 TABLET, EXTENDED RELEASE ORAL at 09:04

## 2025-01-08 RX ADMIN — PENTOXIFYLLINE 400 MG: 400 TABLET, EXTENDED RELEASE ORAL at 13:26

## 2025-01-08 RX ADMIN — ISOSORBIDE MONONITRATE 90 MG: 60 TABLET, EXTENDED RELEASE ORAL at 09:04

## 2025-01-08 RX ADMIN — INSULIN LISPRO 1 UNITS: 100 INJECTION, SOLUTION INTRAVENOUS; SUBCUTANEOUS at 13:25

## 2025-01-08 RX ADMIN — HEPARIN SODIUM 5000 UNITS: 5000 INJECTION, SOLUTION INTRAVENOUS; SUBCUTANEOUS at 05:01

## 2025-01-08 RX ADMIN — PREGABALIN 150 MG: 75 CAPSULE ORAL at 17:40

## 2025-01-08 RX ADMIN — CLOPIDOGREL BISULFATE 75 MG: 75 TABLET ORAL at 09:04

## 2025-01-08 RX ADMIN — OMEGA-3 FATTY ACIDS CAP 1000 MG 1000 MG: 1000 CAP at 17:41

## 2025-01-08 RX ADMIN — FENOFIBRATE 145 MG: 145 TABLET ORAL at 09:05

## 2025-01-08 RX ADMIN — RANOLAZINE 1000 MG: 500 TABLET, FILM COATED, EXTENDED RELEASE ORAL at 09:05

## 2025-01-08 RX ADMIN — DULOXETINE HYDROCHLORIDE 30 MG: 30 CAPSULE, DELAYED RELEASE ORAL at 09:05

## 2025-01-08 RX ADMIN — ATORVASTATIN CALCIUM 40 MG: 40 TABLET, FILM COATED ORAL at 21:58

## 2025-01-08 RX ADMIN — INSULIN LISPRO 5 UNITS: 100 INJECTION, SOLUTION INTRAVENOUS; SUBCUTANEOUS at 13:26

## 2025-01-08 RX ADMIN — TORSEMIDE 20 MG: 20 TABLET ORAL at 17:40

## 2025-01-08 RX ADMIN — INSULIN LISPRO 5 UNITS: 100 INJECTION, SOLUTION INTRAVENOUS; SUBCUTANEOUS at 17:41

## 2025-01-08 RX ADMIN — PREGABALIN 150 MG: 75 CAPSULE ORAL at 09:04

## 2025-01-08 RX ADMIN — HEPARIN SODIUM 5000 UNITS: 5000 INJECTION, SOLUTION INTRAVENOUS; SUBCUTANEOUS at 13:26

## 2025-01-08 RX ADMIN — PREGABALIN 150 MG: 75 CAPSULE ORAL at 21:58

## 2025-01-08 RX ADMIN — PENTOXIFYLLINE 400 MG: 400 TABLET, EXTENDED RELEASE ORAL at 17:40

## 2025-01-08 RX ADMIN — OMEGA-3 FATTY ACIDS CAP 1000 MG 1000 MG: 1000 CAP at 09:03

## 2025-01-08 RX ADMIN — HEPARIN SODIUM 5000 UNITS: 5000 INJECTION, SOLUTION INTRAVENOUS; SUBCUTANEOUS at 21:58

## 2025-01-08 NOTE — DISCHARGE INSTR - AVS FIRST PAGE
DISCHARGE INSTRUCTIONS  ARTERIOGRAM/ANGIOPLASTY/STENT    ACTIVITY: On the evening following the procedure, you should be mostly resting.  Someone should remain with you during the evening and overnight following the procedure.     On the day after your procedure, limit your activity to walking.  Avoid heavy lifting (no more than 15 lbs) for the first three days. Walking up steps and normal activities may be resumed as you feel ready.   You should not drive a car for at least two days following discharge from the hospital. You may ride in a car.   If you have any questions regarding a particular activity, please discuss with your doctor or nurse before you are discharged.    DIET:  Resume your normal diet.  Drink more water than usual for the next 24 hours.    PROCEDURE SITE: You may have a procedure site in your groin, arm, or foot.  You may have surgical glue at your procedure site.  The glue is used to cover the procedure site, assist in closure, and prevent contamination. This adhesive will darken and peel away on its own within one to two weeks. Do not pick at it.    You should shower daily.  Wash incision daily with soap and water, but do not rub or scrub the incision; rinse thoroughly and pat dry.  Do not bathe in a tub or swim for the first 2 week following your procedure or if you have any open wounds.  It is normal to have some bruising, swelling or discoloration around the procedure site.  IF increasing redness, pain, or a bulge develops, call our office immediately.    If present, you may remove the band-aid or “steri-strips” over your procedure site after two days.   If you notice any active bleeding at the site, apply pressure to the site and call our office ([phone]) or [phone].    FOLLOW UP STUDIES:  Your doctor will discuss whether further treatments or follow-up studies are necessary at your first post procedure visit.    FOLLOW UP APPOINTMENTS:  Making and keeping follow up appointments and  ultrasound tests are important to your recovery.  If you have difficulty making it to or keeping your follow up appointments, call the office.    If you have increased pain, fever >101.5, increased drainage, redness or a bad smell at your surgery site, new coldness/numbness of your arm or leg, please call us immediately and GO directly to the ER.    Appt w/ Dr. Clark: 2025 at 11am, [place] office      PLEASE CALL THE OFFICE IF YOU HAVE ANY QUESTIONS  [phone]  [phone] [address], [place] [address]  [address] [place] [address]  [address] [place] [address]  [address] [facility], [place] [address]  [address] [facility], [place] [address]  [address] [facility], [address], [place] [address]  [address] [facility] [address], 2nd Floor, [place] [address]  [address] [facility], 1st Floor, [address], [place] [address]  [address] [facility] [place] [address]  [address] [place] [address] [address], [place] [address]       Discharge Instructions - Podiatry    Weight Bearing Status: Weight bearing as tolerated                    Pain: Continue analgesics as directed    Follow-up appointment instructions: Please make an appointment within one week of discharge with Dr. Arango or at the wound care clinic. Contact sooner if any increase in pain, or signs of infection occur    Wound Care: Leave dressings clean, dry, and intact between professional dressing changes    Nursing Instructions: Please apply Betadine paint. Then cover with Gauze and secure with Kerlix and tape. Please change dressing every Monday, Wednesday, and Friday .

## 2025-01-08 NOTE — PROGRESS NOTES
Thomas Horne is a 79 y.o. male who is currently ordered Vancomycin IV with management by the Pharmacy Consult service.  Relevant clinical data and objective / subjective history reviewed.  Vancomycin Assessment:  Indication and Goal AUC/Trough: Bacteremia (goal -600, trough >10), -600, trough >10  Clinical Status: stable  Micro:     Renal Function:  SCr: 1.15 mg/dL  CrCl: 62 mL/min  Renal replacement: Not on dialysis  Days of Therapy: 6  Current Dose: 1000 mg IV daily prn random level less than 15  Vancomycin Plan:  New Dosin mg IV q24h  Estimated AUC: 475 mcg*hr/mL  Estimated Trough: 12.4 mcg/mL  Next Level: 1/15 with AM labs if vancomycin is still active  Renal Function Monitoring: Daily BMP and UOP  Pharmacy will continue to follow closely for s/sx of nephrotoxicity, infusion reactions and appropriateness of therapy.  BMP and CBC will be ordered per protocol. We will continue to follow the patient’s culture results and clinical progress daily.    Raven Knight, Pharmacist

## 2025-01-08 NOTE — PROGRESS NOTES
Progress Note - Infectious Disease   Thomas Horne 79 y.o. male MRN: [id number]  Unit/Bed#: -01 Encounter: [id number]      Impression and recommendations have been discussed with my attending Dr. Martinez who will provide final attestation.      Impression/Recommendations:  1. Staphylococcus epidermidis bacteremia -patient with noted bacteremia on drawn at admission blood cultures drawn from the same site.  Patient remains clinically and systemically well, without evidence of sepsis or systemic toxicity.  This blood draw has been suspected to be contaminant and not true bacteremia.  Given patient does have pacemaker in place blood cultures were redrawn.      Plan:  Repeat blood cultured without growth for 72H thus far  Stop IV vancomycin  Plan to repeat blood cultures 2 weeks from when vancomycin discontinued     2. Diabetic foot ulcer - Ulcer appears to be ischemic on examination, without any signs of cellulitis.  Arteriogram performed  with Vascular surgery. No antibiotic needed for this indication.  Superficial wound culture is most likely skin colonization.     3. Peripheral artery disease - S/p arteriogram . Follow vascular surgery findings and recommendations.      5. IVY on CKD IIIb  - Continue antibiotic dosing adjustments for IV Vancomycin. Creatinine continues to improve.      6. Type 2 diabetes mellitus - well controlled but is risk factor for poor wound healing. Management per primary service.      ID consult service will continue to follow.    Antibiotics:  IV Vancomycin    24 Hour events:  Yesterday and overnight notes reviewed    Subjective:  Patient has no fever, chills, sweats; no nausea, vomiting, diarrhea; no cough, shortness of breath; no pain. No new symptoms.    Objective:  Vitals:  Temp:  [98 °F (36.7 °C)-98.1 °F (36.7 °C)] 98.1 °F (36.7 °C)  HR:  [67-70] 69  Resp:  [16] 16  BP: (109-137)/(48-76) 116/59  SpO2:  [93 %-100 %] 96 %  Temp (24hrs), Av.1 °F (36.7 °C), Min:98 °F (36.7 °C),  Max:98.1 °F (36.7 °C)  Current: Temperature: 98.1 °F (36.7 °C)    Physical Exam:   General Appearance:  Alert, interactive, nontoxic, no acute distress.   Throat: Oropharynx moist without lesions.    Lungs:   Clear to auscultation bilaterally; no wheezes, rhonchi or rales; respirations unlabored   Heart:  RRR; no murmur, rub or gallop   Abdomen:   Soft, non-tender, non-distended, positive bowel sounds.     Extremities: No clubbing, cyanosis or edema   Skin: No new rashes or lesions. No draining wounds noted. Stable foot ulcer.       Labs, Imaging, & Other studies:   All pertinent labs and imaging studies in PACS were personally reviewed as below.  Results from last 7 days   Lab Units 01/08/25  0458 01/07/25  1908 01/07/25  0550   WBC Thousand/uL 6.31 6.01 5.50   HEMOGLOBIN g/dL 11.4* 12.4 11.5*   PLATELETS Thousands/uL 161 152 152     Results from last 7 days   Lab Units 01/08/25  0458   POTASSIUM mmol/L 3.7   CHLORIDE mmol/L 103   CO2 mmol/L 27   BUN mg/dL 28*   CREATININE mg/dL 1.15   EGFR ml/min/1.73sq m 59   CALCIUM mg/dL 8.1*     Results from last 7 days   Lab Units 01/04/25  2217 01/03/25  2035 01/03/25  1513   BLOOD CULTURE  No Growth at 72 hrs.  No Growth at 72 hrs. Streptococcus parasanguinis*  Streptococcus parasanguinis*  Staphylococcus epidermidis*  --    GRAM STAIN RESULT   --  Gram positive cocci in chains*  Gram positive cocci in chains and clusters* No Polys or Bacteria seen   WOUND CULTURE   --   --  1+ Growth of Staphylococcus aureus*  1+ Growth of     Cinthya Francois DO  [facility]  Internal Medicine Residency, PGY-2

## 2025-01-08 NOTE — CASE MANAGEMENT
Case Management Discharge Planning Note    Patient name Thomas Horne  Location /-01 MRN [id number]  : 1945 Date 2025       Current Admission Date: 2025  Current Admission Diagnosis:Diabetic ulcer of right midfoot associated with diabetes mellitus due to underlying condition, limited to breakdown of skin ([facility])   Patient Active Problem List    Diagnosis Date Noted Date Diagnosed    Gram-positive bacteremia 2025     Acute metabolic encephalopathy 2025     Bacteremia 2025     S/P placement of cardiac pacemaker 2024     Bradycardia 2024     Multiple open wounds of lower extremity 2024     Ulcer of right foot (HCC) 2024     SOB (shortness of breath) 2024     Nausea 2024     Elevated troponin 2024     History of DVT (deep vein thrombosis) 2024     Diabetic ulcer of toe of right foot associated with type 2 diabetes mellitus, with fat layer exposed ([facility]) 2024     Plantar fasciitis, right 07/10/2024     Acute deep vein thrombosis (DVT) of left peroneal vein ([facility]) 2024     Iron deficiency anemia 2024     Shortness of breath 2024     History of colon polyps 2024     Duodenal ulcer 2024     Multiple gastric ulcers 2023     Iron deficiency anemia due to chronic blood loss 2023     Melena 2023     Symptomatic anemia 2023     Frequent PVCs 2023     Chronic diastolic congestive heart failure (HCC) 2023     Acute kidney injury superimposed on stage 3b chronic kidney disease (HCC) 2023     Diabetic ulcer of right midfoot associated with diabetes mellitus due to underlying condition, limited to breakdown of skin ([facility]) 06/15/2023     Hypertensive urgency 2023     Elevated troponin level not due myocardial infarction 2023     Stable angina pectoris ([facility]) 2023     Chronic kidney disease-mineral and bone disorder 2022     Nonrheumatic aortic  valve stenosis 11/11/2022     Gross hematuria 07/26/2022     Platelets decreased ([facility]) 07/22/2022     Acute kidney injury superimposed on chronic kidney disease  ([facility]) 02/16/2022     Actinic keratoses 01/14/2022     Type 2 diabetes mellitus with diabetic peripheral angiopathy without gangrene, with long-term current use of insulin ([facility]) 01/11/2022     Varicose veins of left lower extremity 06/25/2021     History of endovascular stent graft for abdominal aortic aneurysm (AAA) 06/25/2021     Bruit (arterial) 06/25/2021     PAD (peripheral artery disease) (HCC) 06/25/2021     Type 2 diabetes mellitus with diabetic polyneuropathy, with long-term current use of insulin ([facility]) 06/10/2021     Mixed hyperlipidemia 05/11/2021     Primary hypertension 05/11/2021     Coronary artery disease involving native coronary artery of native heart without angina pectoris 05/11/2021     S/P angioplasty with stent 05/11/2021     Hx of CABG 05/11/2021     S/P AAA repair 05/11/2021     S/P prostatectomy 05/11/2021     H/O prostate cancer 05/11/2021       LOS (days): 4  Geometric Mean LOS (GMLOS) (days): 5.5  Days to GMLOS:1.7     OBJECTIVE:  Risk of Unplanned Readmission Score: 36.34         Current admission status: Inpatient   Preferred Pharmacy:   [facility] [place], [place] - [address] [facility]  [address] [facility] [address]  Phone: [phone] Fax: [phone]    [facility] - [place] [id number] [address]  [address]  [address] [address]  Phone: [phone] Fax: [phone]    [id number] - [place] - [address]  [address] [facility] [address]  Phone: [phone] Fax: [phone]    Primary Care Provider: Frank Lombardi, DO    Primary Insurance: JUAN BROOKS  Secondary Insurance:     DISCHARGE DETAILS:                              Met with pt to discuss DCP.  He declined HHC    IMM reviewed and signed                       Treatment Team Recommendation: Home  Discharge Destination Plan:: Home                                IMM Given (Date):: 01/08/25  IMM Given to:: Patient

## 2025-01-08 NOTE — PLAN OF CARE
Problem: PAIN - ADULT  Goal: Verbalizes/displays adequate comfort level or baseline comfort level  Description: Interventions:  - Encourage patient to monitor pain and request assistance  - Assess pain using appropriate pain scale  - Administer analgesics based on type and severity of pain and evaluate response  - Implement non-pharmacological measures as appropriate and evaluate response  - Consider cultural and social influences on pain and pain management  - Notify physician/advanced practitioner if interventions unsuccessful or patient reports new pain  Outcome: Progressing     Problem: INFECTION - ADULT  Goal: Absence or prevention of progression during hospitalization  Description: INTERVENTIONS:  - Assess and monitor for signs and symptoms of infection  - Monitor lab/diagnostic results  - Monitor all insertion sites, i.e. indwelling lines, tubes, and drains  - Monitor endotracheal if appropriate and nasal secretions for changes in amount and color  - [place] appropriate cooling/warming therapies per order  - Administer medications as ordered  - Instruct and encourage patient and family to use good hand hygiene technique  - Identify and instruct in appropriate isolation precautions for identified infection/condition  Outcome: Progressing  Goal: Absence of fever/infection during neutropenic period  Description: INTERVENTIONS:  - Monitor WBC    Outcome: Progressing     Problem: SAFETY ADULT  Goal: Patient will remain free of falls  Description: INTERVENTIONS:  - Educate patient/family on patient safety including physical limitations  - Instruct patient to call for assistance with activity   - Consult OT/PT to assist with strengthening/mobility   - Keep Call bell within reach  - Keep bed low and locked with side rails adjusted as appropriate  - Keep care items and personal belongings within reach  - Initiate and maintain comfort rounds  - Make Fall Risk Sign visible to staff  - Offer Toileting every 2 Hours,  in advance of need  - Initiate/Maintain bed alarm  - Obtain necessary fall risk management equipment  - Apply yellow socks and bracelet for high fall risk patients  - Consider moving patient to room near nurses station  Outcome: Progressing  Goal: Maintain or return to baseline ADL function  Description: INTERVENTIONS:  -  Assess patient's ability to carry out ADLs; assess patient's baseline for ADL function and identify physical deficits which impact ability to perform ADLs (bathing, care of mouth/teeth, toileting, grooming, dressing, etc.)  - Assess/evaluate cause of self-care deficits   - Assess range of motion  - Assess patient's mobility; develop plan if impaired  - Assess patient's need for assistive devices and provide as appropriate  - Encourage maximum independence but intervene and supervise when necessary  - Involve family in performance of ADLs  - Assess for home care needs following discharge   - Consider OT consult to assist with ADL evaluation and planning for discharge  - Provide patient education as appropriate  Outcome: Progressing  Goal: Maintains/Returns to pre admission functional level  Description: INTERVENTIONS:  - Perform AM-PAC 6 Click Basic Mobility/ Daily Activity assessment daily.  - Set and communicate daily mobility goal to care team and patient/family/caregiver.   - Collaborate with rehabilitation services on mobility goals if consulted  - Perform Range of Motion 4 times a day.  - Reposition patient every 2 hours.  - Dangle patient 4 times a day  - Stand patient 3 times a day  - Ambulate patient 3 times a day  - Out of bed to chair 3 times a day   - Out of bed for meals 3 times a day  - Out of bed for toileting  - Record patient progress and toleration of activity level   Outcome: Progressing     Problem: DISCHARGE PLANNING  Goal: Discharge to home or other facility with appropriate resources  Description: INTERVENTIONS:  - Identify barriers to discharge w/patient and caregiver  -  Arrange for needed discharge resources and transportation as appropriate  - Identify discharge learning needs (meds, wound care, etc.)  - Arrange for interpretive services to assist at discharge as needed  - Refer to Case Management Department for coordinating discharge planning if the patient needs post-hospital services based on physician/advanced practitioner order or complex needs related to functional status, cognitive ability, or social support system  Outcome: Progressing     Problem: Knowledge Deficit  Goal: Patient/family/caregiver demonstrates understanding of disease process, treatment plan, medications, and discharge instructions  Description: Complete learning assessment and assess knowledge base.  Interventions:  - Provide teaching at level of understanding  - Provide teaching via preferred learning methods  Outcome: Progressing     Problem: SKIN/TISSUE INTEGRITY - ADULT  Goal: Skin Integrity remains intact(Skin Breakdown Prevention)  Description: Assess:  -Perform Dev assessment  -Clean and moisturize skin  -Inspect skin when repositioning, toileting, and assisting with ADLS  -Assess under medical devices  -Assess extremities for adequate circulation and sensation     Bed Management:  -Have minimal linens on bed & keep smooth, unwrinkled  -Change linens as needed when moist or perspiring  -Avoid sitting or lying in one position for more than 1 hours while in bed  -Keep HOB at 30 degrees     Toileting:  -Offer bedside commode  -Assess for incontinence  -Use incontinent care products after each incontinent episode    Activity:  -Mobilize patient 4 times a day  -Encourage activity and walks on unit  -Encourage or provide ROM exercises   -Turn and reposition patient every 2 Hours  -Use appropriate equipment to lift or move patient in bed  -Instruct/ Assist with weight shifting  when out of bed in chair  -Consider limitation of chair time hour intervals    Skin Care:  -Avoid use of baby powder, tape,  friction and shearing, hot water or constrictive clothing  -Relieve pressure over bony prominences  -Do not massage red bony areas    Next Steps:  -Teach patient strategies to minimize risks   -Consider consults to  interdisciplinary teams  Outcome: Progressing  Goal: Incision(s), wounds(s) or drain site(s) healing without S/S of infection  Description: INTERVENTIONS  - Assess and document dressing, incision, wound bed, drain sites and surrounding tissue  - Provide patient and family education  - Perform skin care/dressing changes  Outcome: Progressing  Goal: Pressure injury heals and does not worsen  Description: Interventions:  - Implement low air loss mattress or specialty surface (Criteria met)  - Apply silicone foam dressing  - Instruct/assist with weight shifting every 2 minutes when in chair   - Limit chair time to 1 hour intervals  - Use special pressure reducing interventions  when in chair   - Apply fecal or urinary incontinence containment device   - Perform passive or active ROM   - Turn and reposition patient & offload bony prominences every 2 hours   - Utilize friction reducing device or surface for transfers   - Consider consults to  interdisciplinary teams  - Use incontinent care products after each incontinent episode  - Consider nutrition services referral as needed  Outcome: Progressing     Problem: Nutrition/Hydration-ADULT  Goal: Nutrient/Hydration intake appropriate for improving, restoring or maintaining nutritional needs  Description: Monitor and assess patient's nutrition/hydration status for malnutrition. Collaborate with interdisciplinary team and initiate plan and interventions as ordered.  Monitor patient's weight and dietary intake as ordered or per policy. Utilize nutrition screening tool and intervene as necessary. Determine patient's food preferences and provide high-protein, high-caloric foods as appropriate.     INTERVENTIONS:  - Monitor oral intake, urinary output, labs, and  treatment plans  - Assess nutrition and hydration status and recommend course of action  - Evaluate amount of meals eaten  - Assist patient with eating if necessary   - Allow adequate time for meals  - Recommend/ encourage appropriate diets, oral nutritional supplements, and vitamin/mineral supplements  - Order, calculate, and assess calorie counts as needed  - Recommend, monitor, and adjust tube feedings and TPN/PPN based on assessed needs  - Assess need for intravenous fluids  - Provide specific nutrition/hydration education as appropriate  - Include patient/family/caregiver in decisions related to nutrition  Outcome: Progressing

## 2025-01-08 NOTE — ASSESSMENT & PLAN NOTE
Lab Results   Component Value Date    HGBA1C 6.2 (H) 10/14/2024     Recent Labs     01/07/25  1916 01/07/25  2102 01/08/25  0815 01/08/25  1122   POCGLU 71 181* 79 152*     Blood Sugar Average: Last 72 hrs:  (P) 143.2    Hold oral medications metformin, Jardiance, Januvia  Continued on home dose Lantus 15 units at bedtime + sliding scale insulin, continue mealtime insulin 5 units TID AC   Diabetic diet

## 2025-01-08 NOTE — PROGRESS NOTES
NEPHROLOGY HOSPITAL PROGRESS NOTE   Thomas Horne 79 y.o. male MRN: [id number]  Unit/Bed#: -01 Encounter: [id number]  Reason for Consult: IVY    Assessment & Plan  Acute kidney injury superimposed on stage 3b chronic kidney disease (HCC)  IVY most likely secondary to ischemic injury secondary to hypotension plus prerenal azotemia in the presence of Jardiance and torsemide plus failure to autoregulate and in the presence of losartan some component of IC GN in light of bacteremia.  After review of records it appears that the patient has a baseline Creatinine of 1.6-1.8 mg/dL.  Most recent renal function below baseline creatinine 1.15.  Primary hypertension  Optimize hemodynamic status to avoid renal ischemic injury.  Maintain MAP > 65mmHg  Avoid BP fluctuations.  Home medications: Torsemide 20 mg p.o. daily, Norvasc 2.5 mg p.o. daily, Jardiance 25 mg p.o. daily, losartan 25 mg p.o. daily, Toprol- mg p.o. daily, Imdur 90 mg p.o. daily,   Current medications: Norvasc 2.5 mg p.o. daily, Imdur 90 mg p.o. daily, Toprol- mg p.o. daily  Can likely resume loop diuretic therapy and Jardiance tomorrow.  Coronary artery disease involving native coronary artery of native heart without angina pectoris  Follow-up with cardiology  Most recent echo from 12/13/2024 moderate to severe aortic stenosis, EF of 50%  Volume status appears stable can resume torsemide plus Jardiance tomorrow morning.  PAD (peripheral artery disease) (formerly [facility])  Follow-up with vascular  Gram-positive bacteremia  As per infectious disease.  Chronic diastolic congestive heart failure (HCC)  Resuming diuretic therapy tomorrow..    Summary:  Kidney function remains quite stable.  Resume torsemide 20 mg daily can likely resume Jardiance tomorrow morning  Stable for discharge from nephrology standpoint    SUBJECTIVE / 24H INTERVAL HISTORY:  Seen and examined.  Status post angiography with angioplasty.  Denies any chest pain, shortness of breath.  No  abdominal pain.    OBJECTIVE:  Current Weight:    Vitals:    01/07/25 2037 01/07/25 2217 01/07/25 2310 01/08/25 0759   BP: 114/76 (!) 109/48 123/54 122/55   BP Location:    Left arm   Pulse: 70 70 67 67   Resp: 16  16    Temp: 98.1 °F (36.7 °C) 98.1 °F (36.7 °C)  98 °F (36.7 °C)   TempSrc:    Oral   SpO2: 96% 93% 94% 95%       Intake/Output Summary (Last 24 hours) at 1/8/2025 1456  Last data filed at 1/8/2025 1328  Gross per 24 hour   Intake 790 ml   Output 1000 ml   Net -210 ml       Physical Exam  Constitutional:       Appearance: He is not ill-appearing.   Eyes:      General: No scleral icterus.  Cardiovascular:      Rate and Rhythm: Normal rate and regular rhythm.   Pulmonary:      Effort: Pulmonary effort is normal.      Breath sounds: Normal breath sounds.   Abdominal:      General: There is no distension.      Palpations: Abdomen is soft.   Musculoskeletal:      Right lower leg: No edema.      Left lower leg: No edema.   Skin:     General: Skin is warm and dry.      Findings: No rash.   Neurological:      Mental Status: He is alert and oriented to person, place, and time.         Medications:    Current Facility-Administered Medications:     acetaminophen (TYLENOL) tablet 975 mg, 975 mg, Oral, Q6H PRN, Belkis Howard PA-C, 975 mg at 01/06/25 2049    amLODIPine (NORVASC) tablet 2.5 mg, 2.5 mg, Oral, Daily, Felecia Louie MD, 2.5 mg at 01/08/25 0903    aspirin chewable tablet 81 mg, 81 mg, Oral, Daily, Razia Dumont PA-C, 81 mg at 01/08/25 0903    atorvastatin (LIPITOR) tablet 40 mg, 40 mg, Oral, HS, Felecia Louie MD, 40 mg at 01/07/25 2300    clopidogrel (PLAVIX) tablet 75 mg, 75 mg, Oral, Daily, Felecia Louie MD, 75 mg at 01/08/25 0904    DULoxetine (CYMBALTA) delayed release capsule 30 mg, 30 mg, Oral, Daily, Felecia Louie MD, 30 mg at 01/08/25 0905    fenofibrate (TRICOR) tablet 145 mg, 145 mg, Oral, Daily, Felecia Louie MD, 145 mg at 01/08/25 0905    fish oil capsule 1,000 mg,  1,000 mg, Oral, BID, Felecia Louie MD, 1,000 mg at 01/08/25 0903    heparin (porcine) subcutaneous injection 5,000 Units, 5,000 Units, Subcutaneous, Q8H CONSTANTINE, Felecia Louie MD, 5,000 Units at 01/08/25 1326    HYDROmorphone (DILAUDID) injection 0.5 mg, 0.5 mg, Intravenous, Q4H PRN, Felecia Louie MD    insulin glargine (LANTUS) subcutaneous injection 15 Units 0.15 mL, 15 Units, Subcutaneous, HS, Cinthya Sagastume PA-C, 15 Units at 01/07/25 2300    insulin lispro (HumALOG/ADMELOG) 100 units/mL subcutaneous injection 1-5 Units, 1-5 Units, Subcutaneous, TID AC, 1 Units at 01/08/25 1325 **AND** Fingerstick Glucose (POCT), , , TID AC, Felecia Louie MD    insulin lispro (HumALOG/ADMELOG) 100 units/mL subcutaneous injection 1-5 Units, 1-5 Units, Subcutaneous, HS, Felecia Louie MD, 1 Units at 01/07/25 2302    insulin lispro (HumALOG/ADMELOG) 100 units/mL subcutaneous injection 5 Units, 5 Units, Subcutaneous, TID With Meals, Cinthya Sagastume PA-C, 5 Units at 01/08/25 1326    isosorbide mononitrate (IMDUR) 24 hr tablet 90 mg, 90 mg, Oral, Daily, eFlecia Louie MD, 90 mg at 01/08/25 0904    metoprolol succinate (TOPROL-XL) 24 hr tablet 100 mg, 100 mg, Oral, Daily, Felecia Louie MD, 100 mg at 01/08/25 0904    multivitamin-minerals (CENTRUM) tablet 1 tablet, 1 tablet, Oral, Daily, Felecia Louie MD, 1 tablet at 01/08/25 0903    nitroglycerin (NITROSTAT) SL tablet 0.4 mg, 0.4 mg, Sublingual, Q5 Min PRN, Felecia Louie MD    oxyCODONE (ROXICODONE) IR tablet 5 mg, 5 mg, Oral, Q12H PRN, Felecia Louie MD, 5 mg at 01/06/25 2049    pantoprazole (PROTONIX) EC tablet 40 mg, 40 mg, Oral, Daily Before Breakfast, Felecia Louie MD, 40 mg at 01/08/25 0908    pentoxifylline (TRENtal) ER tablet 400 mg, 400 mg, Oral, TID With Meals, Felecia Louie MD, 400 mg at 01/08/25 1326    pregabalin (LYRICA) capsule 150 mg, 150 mg, Oral, TID, Felecia Louie MD, 150 mg at 01/08/25 0904    ranolazine (RANEXA) 12 hr tablet  "1,000 mg, 1,000 mg, Oral, BID, Felecia Louie MD, 1,000 mg at 01/08/25 0905    [Held by provider] torsemide (DEMADEX) tablet 20 mg, 20 mg, Oral, Daily, Felecia Louie MD    Laboratory Results:  Results from last 7 days   Lab Units 01/08/25  0458 01/07/25  1908 01/07/25  0550 01/06/25  0524 01/05/25  0647 01/04/25  0651 01/03/25  1459   WBC Thousand/uL 6.31 6.01 5.50 6.13 12.36* 13.98* 10.06   HEMOGLOBIN g/dL 11.4* 12.4 11.5* 11.6* 12.8 11.9* 14.3   HEMATOCRIT % 36.0* 39.1 36.9 35.6* 39.5 36.4* 45.3   PLATELETS Thousands/uL 161 152 152 159 182 224 307   POTASSIUM mmol/L 3.7 3.6 3.7 3.4* 3.7 3.8 4.5   CHLORIDE mmol/L 103 102 100 97 98 100 100   CO2 mmol/L 27 28 27 27 25 28 30   BUN mg/dL 28* 29* 38* 44* 47* 42* 34*   CREATININE mg/dL 1.15 1.32* 1.84* 2.03* 2.75* 2.45* 1.73*   CALCIUM mg/dL 8.1* 8.3* 8.3* 8.4 8.5 8.5 9.9   MAGNESIUM mg/dL  --   --   --  1.8*  --  1.5*  --    PHOSPHORUS mg/dL  --   --   --  2.3  --   --   --        Portions of the record may have been created with voice recognition software. Occasional wrong word or \"sound a like\" substitutions may have occurred due to the inherent limitations of voice recognition software. Read the chart carefully and recognize, using context, where substitutions have occurred.If you have any questions, please contact the dictating provider.  "

## 2025-01-08 NOTE — ASSESSMENT & PLAN NOTE
Follow-up with cardiology  Most recent echo from 12/13/2024 moderate to severe aortic stenosis, EF of 50%  Volume status appears stable can resume torsemide plus Jardiance tomorrow morning.

## 2025-01-08 NOTE — ASSESSMENT & PLAN NOTE
2 of 2 blood cultures from 1/3/2025 positive for gram-positive cocci; culture with Staph epidermidis and also with Strep parasanguinis   Repeat blood cultures x 2 negative at 48 hrs  ID consult appreciated, suspect this may represent contaminant.  Since repeat cultures are negative discontinue abx.  Recommend repeat blood cultures in 2 to 3 weeks to ensure no growth as an outpt.

## 2025-01-08 NOTE — ASSESSMENT & PLAN NOTE
Optimize hemodynamic status to avoid renal ischemic injury.  Maintain MAP > 65mmHg  Avoid BP fluctuations.  Home medications: Torsemide 20 mg p.o. daily, Norvasc 2.5 mg p.o. daily, Jardiance 25 mg p.o. daily, losartan 25 mg p.o. daily, Toprol- mg p.o. daily, Imdur 90 mg p.o. daily,   Current medications: Norvasc 2.5 mg p.o. daily, Imdur 90 mg p.o. daily, Toprol- mg p.o. daily  Can likely resume loop diuretic therapy and Jardiance tomorrow.

## 2025-01-08 NOTE — ASSESSMENT & PLAN NOTE
Lab Results   Component Value Date    EGFR 59 01/08/2025    EGFR 50 01/07/2025    EGFR 33 01/07/2025    CREATININE 1.15 01/08/2025    CREATININE 1.32 (H) 01/07/2025    CREATININE 1.84 (H) 01/07/2025   Noted with rise in creatinine to 2.45 on 1/4/2025 increased from baseline of approximately 1.6-1.8  Cr peak of 2.7, now improved to 1.1  Appreciate nephrology consult and recommendations  Continue holding losartan/torsemide   S/p IV fluids for agram  Limit nephrotoxins and avoid hypotension  Monitor intake and output  Daily BMP

## 2025-01-08 NOTE — PROGRESS NOTES
Progress Note - Vascular Surgery   Name: Thomas Horne 79 y.o. male I MRN: [id number]  Unit/Bed#: -01 I Date of Admission: 1/4/2025   Date of Service: 1/8/2025 I Hospital Day: 4     Assessment & Plan  PAD (peripheral artery disease) ([facility])  80 y/o M w/ PAD s/p R CFA-peroneal bypass with reversed CryoVein 11/16/2023, s/p L distal SFA to BK pop bypass with PTFE 8/30/2024 ([place]) presents with nonhealing right hallux wound.    PMHx former smoker with HTN, HLD, CAD s/p CABG, AAA s/p EVAR, DM type II, CHF, pulmonary HTN, CKD, prostate CA s/p prostatectomy with urinary sphincter placement, provoked LLE DVT.    LEAD 12/10-  RIGHT- high grade stenosis vs occlusion distal superficial femoral artery. Posterior tibial and anterior tibial artery occlusive disease. The femoral to popliteal artery bypass graft could not be identified.  FABIOLA 0.47/x/x; PVR/ PPG tracings are dampened.  LEFT- 50-75% stenosis tibioperoneal trunk/distal anastomosis distal superficial femoral- below knee popliteal bypass graft. Peroneal artery occlusive disease.  FABIOLA 0.79/105/51; PVR/ PPG tracings are dampened.    S/p Right common femoral artery antegrade access,Subintimal crossing of fem-pop occlusion with outback device, angioplasty/stenting with 6mm Usha stent on 1/7    Plan:  -Continue ASA, Plavix, Statin indefinitely  - Optimized from a vascular standpoint for podiatry. Appreciate podiatry recommendations.   - Obtain FABIOLA for monitoring  - Nephrology consultation for CKD  - Outpatient follow up with Vascular surgery  - Remainder of care per primary team      24 Hour Events : No acute events overnight. Afebrile, hemodynamically stable. Pain controlled. He says he is feeling well.      Objective :  Temp:  [98.1 °F (36.7 °C)] 98.1 °F (36.7 °C)  HR:  [67-71] 67  BP: (109-142)/(48-81) 123/54  Resp:  [16] 16  SpO2:  [93 %-100 %] 94 %  O2 Device: None (Room air)  Nasal Cannula O2 Flow Rate (L/min):  [2 L/min-6 L/min] 2 L/min  FiO2 (%):  [21-22]  "21     I/O         01/06 0701 01/07 0700 01/07 0701 01/08 0700 01/08 0701 01/09 0700    P.O. 341 0     I.V. 1159.2      Total Intake 1500.2 0     Urine 1600 750     Stool 0      Total Output 1600 750     Net -99.8 -750            Unmeasured Stool Occurrence 0 x              Physical Exam:  General: No acute distress, alert and oriented  CV: Well perfused, regular rate  Lungs: Normal work of breathing, no increased respiratory effort  Abdomen: Soft, non-tender, non-distended.  Extremities: Right groin soft, no palpable hematoma or pulsatile mass, right foot wound, right DP and peroneal signals multiphasic  Skin: Warm, dry, right toe wound        Lab Results: I have reviewed the following results:  CBC with diff:   Lab Results   Component Value Date    WBC 6.31 01/08/2025    HGB 11.4 (L) 01/08/2025    HCT 36.0 (L) 01/08/2025    MCV 85 01/08/2025     01/08/2025    RBC 4.22 01/08/2025    MCH 27.0 01/08/2025    MCHC 31.7 01/08/2025    RDW 14.4 01/08/2025    MPV 12.4 01/08/2025    NRBC 0 01/06/2025   ,   BMP/CMP:  Lab Results   Component Value Date    SODIUM 138 01/08/2025    K 3.7 01/08/2025     01/08/2025    CO2 27 01/08/2025    CO2 25 08/30/2024    BUN 28 (H) 01/08/2025    CREATININE 1.15 01/08/2025    GLUCOSE 206 (H) 08/30/2024    CALCIUM 8.1 (L) 01/08/2025    AST 16 12/19/2024    ALT 6 (L) 12/19/2024    ALKPHOS 41 12/19/2024    EGFR 59 01/08/2025   ,   Lipid Panel: No results found for: \"CHOL\",   Coags:   Lab Results   Component Value Date    PTT 35 (H) 12/12/2024    INR 1.19 01/07/2025   ,   Blood Culture:   Lab Results   Component Value Date    BLOODCX No Growth at 72 hrs. 01/04/2025    BLOODCX No Growth at 72 hrs. 01/04/2025   ,   Urinalysis:   Lab Results   Component Value Date    COLORU Yellow 12/12/2024    CLARITYU Clear 12/12/2024    SPECGRAV 1.020 12/12/2024    PHUR 5.5 12/12/2024    LEUKOCYTESUR Negative 12/12/2024    NITRITE Negative 12/12/2024    GLUCOSEU 300 (3/10%) (A) 12/12/2024    " "KETONESU Negative 12/12/2024    BILIRUBINUR Negative 12/12/2024    BLOODU Negative 12/12/2024   ,   Urine Culture: No results found for: \"URINECX\",   Wound Culure:   Lab Results   Component Value Date    WOUNDCULT 1+ Growth of Staphylococcus aureus (A) 01/03/2025    WOUNDCULT 1+ Growth of 01/03/2025       Imaging Results Review: No pertinent imaging studies reviewed.  Other Study Results Review: No additional pertinent studies reviewed.    VTE Prophylaxis: VTE covered by:  heparin (porcine), Subcutaneous, 5,000 Units at 01/08/25 0501     "

## 2025-01-08 NOTE — ASSESSMENT & PLAN NOTE
Patient was sent to the ED at [facility] on 1/3/25 after wound care noted worsening of his right foot ulcer; note underlying history of PAD. Evaluated by vascular surgery in the ED and transferred to [facility] for angiogram and possible bypass redo.  IR consulted and s/p agram with subintimal crossing of fem-pop occlusion with outback device, angioplasty/stenting with 6mm Usha stent on 1/7   Vascular repeating FABIOLA and otherwise recommending outpt vascular f/u.    Podiatry following, continue local wound care.  Cleared from podiatry standpoint for discharge   ID following, was on IV vanco with concern for positive blood cultures as below.  Feel this represents contaminant and can d/c abx.  +wound culture is colonization and without cellulitis does not require antibiotic for this.    Continue ASA, Plavix, statin  Analgesics as needed

## 2025-01-08 NOTE — ASSESSMENT & PLAN NOTE
78 y/o M w/ PAD s/p R CFA-peroneal bypass with reversed CryoVein 11/16/2023, s/p L distal SFA to BK pop bypass with PTFE 8/30/2024 ([place]) presents with nonhealing right hallux wound.    PMHx former smoker with HTN, HLD, CAD s/p CABG, AAA s/p EVAR, DM type II, CHF, pulmonary HTN, CKD, prostate CA s/p prostatectomy with urinary sphincter placement, provoked LLE DVT.    LEAD 12/10-  RIGHT- high grade stenosis vs occlusion distal superficial femoral artery. Posterior tibial and anterior tibial artery occlusive disease. The femoral to popliteal artery bypass graft could not be identified.  FABIOLA 0.47/x/x; PVR/ PPG tracings are dampened.  LEFT- 50-75% stenosis tibioperoneal trunk/distal anastomosis distal superficial femoral- below knee popliteal bypass graft. Peroneal artery occlusive disease.  FABIOLA 0.79/105/51; PVR/ PPG tracings are dampened.    S/p Right common femoral artery antegrade access,Subintimal crossing of fem-pop occlusion with outback device, angioplasty/stenting with 6mm Usha stent on 1/7    Plan:  -Continue ASA, Plavix, Statin indefinitely  - Optimized from a vascular standpoint for podiatry. Appreciate podiatry recommendations.   - Obtain FABIOLA for monitoring  - Nephrology consultation for CKD  - Outpatient follow up with Vascular surgery  - Remainder of care per primary team

## 2025-01-08 NOTE — PROGRESS NOTES
Progress Note - Hospitalist   Name: Thomas Horne 79 y.o. male I MRN: [id number]  Unit/Bed#: -01 I Date of Admission: 1/4/2025   Date of Service: 1/8/2025 I Hospital Day: 4    Assessment & Plan  Diabetic ulcer of right midfoot associated with diabetes mellitus due to underlying condition, limited to breakdown of skin (HCC)  Patient was sent to the ED at [facility] on 1/3/25 after wound care noted worsening of his right foot ulcer; note underlying history of PAD. Evaluated by vascular surgery in the ED and transferred to [facility] for angiogram and possible bypass redo.  IR consulted and s/p agram with subintimal crossing of fem-pop occlusion with outback device, angioplasty/stenting with 6mm Usha stent on 1/7   Vascular repeating FABIOLA and otherwise recommending outpt vascular f/u.    Podiatry following, continue local wound care.  Cleared from podiatry standpoint for discharge   ID following, was on IV vanco with concern for positive blood cultures as below.  Feel this represents contaminant and can d/c abx.  +wound culture is colonization and without cellulitis does not require antibiotic for this.    Continue ASA, Plavix, statin  Analgesics as needed  Gram-positive bacteremia  2 of 2 blood cultures from 1/3/2025 positive for gram-positive cocci; culture with Staph epidermidis and also with Strep parasanguinis   Repeat blood cultures x 2 negative at 48 hrs  ID consult appreciated, suspect this may represent contaminant.  Since repeat cultures are negative discontinue abx.  Recommend repeat blood cultures in 2 to 3 weeks to ensure no growth as an outpt.   Acute kidney injury superimposed on stage 3b chronic kidney disease (HCC)  Lab Results   Component Value Date    EGFR 59 01/08/2025    EGFR 50 01/07/2025    EGFR 33 01/07/2025    CREATININE 1.15 01/08/2025    CREATININE 1.32 (H) 01/07/2025    CREATININE 1.84 (H) 01/07/2025   Noted with rise in creatinine to 2.45 on 1/4/2025 increased from  baseline of approximately 1.6-1.8  Cr peak of 2.7, now improved to 1.1  Appreciate nephrology consult and recommendations  Continue holding losartan/torsemide   S/p IV fluids for agram  Limit nephrotoxins and avoid hypotension  Monitor intake and output  Daily BMP  Acute metabolic encephalopathy  Family noting mild confusion 1/5/2025, no focal neurodeficits on exam  Likely multifactorial with above conditions, with component of hospital delirium  Delirium precautions, supportive cares, frequent reorientation  Appears resolved   Type 2 diabetes mellitus with diabetic polyneuropathy, with long-term current use of insulin ([facility])  Lab Results   Component Value Date    HGBA1C 6.2 (H) 10/14/2024     Recent Labs     01/07/25  1916 01/07/25  2102 01/08/25  0815 01/08/25  1122   POCGLU 71 181* 79 152*     Blood Sugar Average: Last 72 hrs:  (P) 143.2    Hold oral medications metformin, Jardiance, Januvia  Continued on home dose Lantus 15 units at bedtime + sliding scale insulin, continue mealtime insulin 5 units TID AC   Diabetic diet   Chronic diastolic congestive heart failure (HCC)  Wt Readings from Last 3 Encounters:   01/03/25 99.9 kg (220 lb 4 oz)   12/23/24 104 kg (230 lb)   12/23/24 104 kg (229 lb)   Echo (12/13/24) - EF 50%, abnormal diastolic function  Torsemide held and receiving gentle IV fluids per nephrology   Monitor volume status with daily weights, I&Os  PAD (peripheral artery disease) ([facility])  History of bilateral lower extremity bypass   Plan for A-gram as above  Coronary artery disease involving native coronary artery of native heart without angina pectoris  S/p CABG x 3 in 2002 and PCI with drug-eluting stent to distal left circumflex in 2021  Continue Plavix, statin, BB, Ranexa, Imdur  Primary hypertension  Continue home dose Amlodipine and Metoprolol   Losartan and Torsemide held in the setting of IVY  Monitor BP routinely   History of DVT (deep vein thrombosis)  Not on anticoagulation at home  currently  Lower extremity Doppler done on 12/13/2024 showed no DVT  Heparin s/c for DVT prophylaxis  S/P placement of cardiac pacemaker  Recent PPM placement on 12/16/2024 noted  Mixed hyperlipidemia  Continue statin, fish oil, fenofibrate    VTE Pharmacologic Prophylaxis: VTE Score: 9 High Risk (Score >/= 5) - Pharmacological DVT Prophylaxis Ordered: heparin. Sequential Compression Devices Ordered.    Mobility:   Basic Mobility Inpatient Raw Score: 18  JH-HLM Goal: 6: Walk 10 steps or more  JH-HLM Achieved: 6: Walk 10 steps or more  JH-HLM Goal achieved. Continue to encourage appropriate mobility.    Patient Centered Rounds: I performed bedside rounds with nursing staff today.   Discussions with Specialists or Other Care Team Provider: JACOB BADILLO.    Education and Discussions with Family / Patient: Updated  (daughter) via phone. Whitley     Current Length of Stay: 4 day(s)  Current Patient Status: Inpatient   Certification Statement: The patient will continue to require additional inpatient hospital stay due to follow labs/vitals, FABIOLA, likely dc tomorrow  Discharge Plan: Anticipate discharge tomorrow to home.    Code Status: Level 1 - Full Code    Subjective   No complaints.  Doing really well today and very happy with how agram went yesterday     Objective :  Temp:  [98 °F (36.7 °C)-98.1 °F (36.7 °C)] 98 °F (36.7 °C)  HR:  [67-71] 67  BP: (109-137)/(48-76) 122/55  Resp:  [16] 16  SpO2:  [93 %-100 %] 95 %  O2 Device: None (Room air)  Nasal Cannula O2 Flow Rate (L/min):  [2 L/min-6 L/min] 2 L/min  FiO2 (%):  [21] 21    There is no height or weight on file to calculate BMI.     Input and Output Summary (last 24 hours):     Intake/Output Summary (Last 24 hours) at 1/8/2025 1427  Last data filed at 1/8/2025 1328  Gross per 24 hour   Intake 790 ml   Output 1000 ml   Net -210 ml       Physical Exam  Vitals reviewed.   Constitutional:       General: He is not in acute distress.     Appearance: He is not  toxic-appearing.   HENT:      Head: Normocephalic and atraumatic.   Eyes:      Extraocular Movements: Extraocular movements intact.   Cardiovascular:      Rate and Rhythm: Normal rate and regular rhythm.   Pulmonary:      Effort: Pulmonary effort is normal. No respiratory distress.   Abdominal:      General: Bowel sounds are normal. There is no distension.      Palpations: Abdomen is soft.   Musculoskeletal:         General: Normal range of motion.   Neurological:      General: No focal deficit present.      Mental Status: He is alert and oriented to person, place, and time.   Psychiatric:         Mood and Affect: Mood normal.         Behavior: Behavior normal.         Thought Content: Thought content normal.       Lines/Drains:              Lab Results: I have reviewed the following results:   Results from last 7 days   Lab Units 01/08/25  0458 01/07/25  0550 01/06/25  0524 01/05/25  0647   WBC Thousand/uL 6.31   < > 6.13 12.36*   HEMOGLOBIN g/dL 11.4*   < > 11.6* 12.8   HEMATOCRIT % 36.0*   < > 35.6* 39.5   PLATELETS Thousands/uL 161   < > 159 182   BANDS PCT %  --   --   --  6   SEGS PCT %  --   --  87*  --    LYMPHO PCT %  --   --  5* 0*   MONO PCT %  --   --  5 0*   EOS PCT %  --   --  2 2    < > = values in this interval not displayed.     Results from last 7 days   Lab Units 01/08/25  0458   SODIUM mmol/L 138   POTASSIUM mmol/L 3.7   CHLORIDE mmol/L 103   CO2 mmol/L 27   BUN mg/dL 28*   CREATININE mg/dL 1.15   ANION GAP mmol/L 8   CALCIUM mg/dL 8.1*   GLUCOSE RANDOM mg/dL 82     Results from last 7 days   Lab Units 01/07/25  0550   INR  1.19     Results from last 7 days   Lab Units 01/08/25  1122 01/08/25  0815 01/07/25  2102 01/07/25  1916 01/07/25  1837 01/07/25  1203 01/07/25  0736 01/06/25  2118 01/06/25  1729 01/06/25  1221 01/06/25  0806 01/05/25  2155   POC GLUCOSE mg/dl 152* 79 181* 71 53* 114 97 183* 117 138 111 190*               Recent Cultures (last 7 days):   Results from last 7 days   Lab Units  01/04/25  2217 01/03/25  2035 01/03/25  1513   BLOOD CULTURE  No Growth at 72 hrs.  No Growth at 72 hrs. Streptococcus parasanguinis*  Streptococcus parasanguinis*  Staphylococcus epidermidis*  --    GRAM STAIN RESULT   --  Gram positive cocci in chains*  Gram positive cocci in chains and clusters* No Polys or Bacteria seen   WOUND CULTURE   --   --  1+ Growth of Staphylococcus aureus*  1+ Growth of       Imaging Results Review: No pertinent imaging studies reviewed.  Other Study Results Review: No additional pertinent studies reviewed.    Last 24 Hours Medication List:     Current Facility-Administered Medications:     acetaminophen (TYLENOL) tablet 975 mg, Q6H PRN    amLODIPine (NORVASC) tablet 2.5 mg, Daily    aspirin chewable tablet 81 mg, Daily    atorvastatin (LIPITOR) tablet 40 mg, HS    clopidogrel (PLAVIX) tablet 75 mg, Daily    DULoxetine (CYMBALTA) delayed release capsule 30 mg, Daily    fenofibrate (TRICOR) tablet 145 mg, Daily    fish oil capsule 1,000 mg, BID    heparin (porcine) subcutaneous injection 5,000 Units, Q8H CONSTANTINE    HYDROmorphone (DILAUDID) injection 0.5 mg, Q4H PRN    insulin glargine (LANTUS) subcutaneous injection 15 Units 0.15 mL, HS    insulin lispro (HumALOG/ADMELOG) 100 units/mL subcutaneous injection 1-5 Units, TID AC **AND** Fingerstick Glucose (POCT), TID AC    insulin lispro (HumALOG/ADMELOG) 100 units/mL subcutaneous injection 1-5 Units, HS    insulin lispro (HumALOG/ADMELOG) 100 units/mL subcutaneous injection 5 Units, TID With Meals    isosorbide mononitrate (IMDUR) 24 hr tablet 90 mg, Daily    metoprolol succinate (TOPROL-XL) 24 hr tablet 100 mg, Daily    multivitamin-minerals (CENTRUM) tablet 1 tablet, Daily    nitroglycerin (NITROSTAT) SL tablet 0.4 mg, Q5 Min PRN    oxyCODONE (ROXICODONE) IR tablet 5 mg, Q12H PRN    pantoprazole (PROTONIX) EC tablet 40 mg, Daily Before Breakfast    pentoxifylline (TRENtal) ER tablet 400 mg, TID With Meals    pregabalin (LYRICA) capsule  150 mg, TID    ranolazine (RANEXA) 12 hr tablet 1,000 mg, BID    [Held by provider] torsemide (DEMADEX) tablet 20 mg, Daily    Administrative Statements   Today, Patient Was Seen By: Deepthi Salguero PA-C      **Please Note: This note may have been constructed using a voice recognition system.**

## 2025-01-08 NOTE — PROGRESS NOTES
[facility] Podiatry - Progress Note  Patient: Thomas Horne 79 y.o. male   MRN: [id number]  PCP: Frank Lombardi, DO  Unit/Bed#: -01 Encounter: [id number]  Date Of Visit: 25    ASSESSMENT:    Thomas Horne is a 79 y.o. male with:    Right non-healing diabetic foot ulceration, [place] stage 4  L heel healed wound  T2DM with diabetic neuropathy.  PAD, CAD, s/p EVAR, L SFA to BK pop bypass  S/p angioplasty/stenting on 24  IVY history  History of DVT    PLAN:    Patient seen evaluated bedside  Of note right great toe eschar is stable without signs soft tissue infection.  Patient is postop day 1 of right angioplasty/stenting with the peroneal artery patent and providing flow to the DP/PT through collaterals.  Plan to continue local wound care.  Patient is stable for discharge from podiatry perspective and podiatry will be signing off at this time with outpatient wound care follow-up.  Patient will be following up with Dr. Arango.  ID waiting on repeat blood cultures.  Right heel corn debrided without incident. Placed pillow below heels for further offloading  Patient is stable with no elevation in white count.  Continue local wound care, appreciate nursing assistance with dressing changes.  Elevation on green foam wedges or pillows when non-ambulatory.  Rest of care per primary team.    Weight bearing status: Weightbearing as tolerated to right foot      SUBJECTIVE:     The patient was seen, evaluated, and assessed at bedside today. The patient was awake, alert, and in no acute distress. No acute events overnight. The patient reports no new concerns aside from a corn at the back of his right heel. Patient denies N/V/F/chills/SOB/CP.      OBJECTIVE:     Vitals:   /55   Pulse 67   Temp 98 °F (36.7 °C)   Resp 16   SpO2 95%     Temp (24hrs), Av.1 °F (36.7 °C), Min:98 °F (36.7 °C), Max:98.1 °F (36.7 °C)      Physical Exam:     General:  Alert, cooperative, and in no distress.  Lower extremity exam:   "Cardiovascular status at baseline.  Neurological status at baseline.  Musculoskeletal status at baseline. No calf tenderness noted.     Lower extremity wound(s) as noted below:  Location: Right great toe  Length 3 cm: Width 3.5 cm: Depth 0.2 cm:   Deepest Tissue Noted in Base: Subcutaneous  Probe to Bone: No  Granulation: 20% Fibrotic Tissue: 30% Necrotic Tissue: 50%   Drainage Amount: None  Signs of Infection: No      Clinical Images 01/08/25:      Additional Data:     Labs:    Results from last 7 days   Lab Units 01/08/25  0458 01/07/25  0550 01/06/25  0524   WBC Thousand/uL 6.31   < > 6.13   HEMOGLOBIN g/dL 11.4*   < > 11.6*   HEMATOCRIT % 36.0*   < > 35.6*   PLATELETS Thousands/uL 161   < > 159   SEGS PCT %  --   --  87*   LYMPHO PCT %  --   --  5*   MONO PCT %  --   --  5   EOS PCT %  --   --  2    < > = values in this interval not displayed.     Results from last 7 days   Lab Units 01/08/25  0458   POTASSIUM mmol/L 3.7   CHLORIDE mmol/L 103   CO2 mmol/L 27   BUN mg/dL 28*   CREATININE mg/dL 1.15   CALCIUM mg/dL 8.1*     Results from last 7 days   Lab Units 01/07/25  0550   INR  1.19       * I Have Reviewed All Lab Data Listed Above.    Recent Cultures (last 7 days):     Results from last 7 days   Lab Units 01/04/25  2217 01/03/25  2035 01/03/25  1513   BLOOD CULTURE  No Growth at 72 hrs.  No Growth at 72 hrs. Streptococcus parasanguinis*  Streptococcus parasanguinis*  Staphylococcus epidermidis*  --    GRAM STAIN RESULT   --  Gram positive cocci in chains*  Gram positive cocci in chains and clusters* No Polys or Bacteria seen   WOUND CULTURE   --   --  1+ Growth of Staphylococcus aureus*  1+ Growth of               ** Please Note: Portions of the record may have been created with voice recognition software. Occasional wrong word or \"sound a like\" substitutions may have occurred due to the inherent limitations of voice recognition software. Read the chart carefully and recognize, using context, where " substitutions have occurred. **

## 2025-01-08 NOTE — ASSESSMENT & PLAN NOTE
Family noting mild confusion 1/5/2025, no focal neurodeficits on exam  Likely multifactorial with above conditions, with component of hospital delirium  Delirium precautions, supportive cares, frequent reorientation  Appears resolved

## 2025-01-08 NOTE — ASSESSMENT & PLAN NOTE
IVY most likely secondary to ischemic injury secondary to hypotension plus prerenal azotemia in the presence of Jardiance and torsemide plus failure to autoregulate and in the presence of losartan some component of IC GN in light of bacteremia.  After review of records it appears that the patient has a baseline Creatinine of 1.6-1.8 mg/dL.  Most recent renal function below baseline creatinine 1.15.

## 2025-01-09 VITALS
RESPIRATION RATE: 16 BRPM | HEART RATE: 70 BPM | DIASTOLIC BLOOD PRESSURE: 61 MMHG | TEMPERATURE: 97.9 F | OXYGEN SATURATION: 95 % | SYSTOLIC BLOOD PRESSURE: 116 MMHG

## 2025-01-09 LAB
ANION GAP SERPL CALCULATED.3IONS-SCNC: 8 MMOL/L (ref 4–13)
BASOPHILS # BLD AUTO: 0.01 THOUSANDS/ΜL (ref 0–0.1)
BASOPHILS NFR BLD AUTO: 0 % (ref 0–1)
BUN SERPL-MCNC: 31 MG/DL (ref 5–25)
CALCIUM SERPL-MCNC: 9 MG/DL (ref 8.4–10.2)
CHLORIDE SERPL-SCNC: 100 MMOL/L (ref 96–108)
CO2 SERPL-SCNC: 30 MMOL/L (ref 21–32)
CREAT SERPL-MCNC: 1.3 MG/DL (ref 0.6–1.3)
EOSINOPHIL # BLD AUTO: 0.31 THOUSAND/ΜL (ref 0–0.61)
EOSINOPHIL NFR BLD AUTO: 5 % (ref 0–6)
ERYTHROCYTE [DISTWIDTH] IN BLOOD BY AUTOMATED COUNT: 14.3 % (ref 11.6–15.1)
GFR SERPL CREATININE-BSD FRML MDRD: 51 ML/MIN/1.73SQ M
GLUCOSE SERPL-MCNC: 117 MG/DL (ref 65–140)
GLUCOSE SERPL-MCNC: 142 MG/DL (ref 65–140)
GLUCOSE SERPL-MCNC: 196 MG/DL (ref 65–140)
HCT VFR BLD AUTO: 35.5 % (ref 36.5–49.3)
HGB BLD-MCNC: 11.4 G/DL (ref 12–17)
IMM GRANULOCYTES # BLD AUTO: 0.02 THOUSAND/UL (ref 0–0.2)
IMM GRANULOCYTES NFR BLD AUTO: 0 % (ref 0–2)
LYMPHOCYTES # BLD AUTO: 1.51 THOUSANDS/ΜL (ref 0.6–4.47)
LYMPHOCYTES NFR BLD AUTO: 24 % (ref 14–44)
MCH RBC QN AUTO: 26.8 PG (ref 26.8–34.3)
MCHC RBC AUTO-ENTMCNC: 32.1 G/DL (ref 31.4–37.4)
MCV RBC AUTO: 84 FL (ref 82–98)
MONOCYTES # BLD AUTO: 0.59 THOUSAND/ΜL (ref 0.17–1.22)
MONOCYTES NFR BLD AUTO: 9 % (ref 4–12)
NEUTROPHILS # BLD AUTO: 3.91 THOUSANDS/ΜL (ref 1.85–7.62)
NEUTS SEG NFR BLD AUTO: 62 % (ref 43–75)
NRBC BLD AUTO-RTO: 0 /100 WBCS
PLATELET # BLD AUTO: 185 THOUSANDS/UL (ref 149–390)
PMV BLD AUTO: 11.8 FL (ref 8.9–12.7)
POTASSIUM SERPL-SCNC: 3.5 MMOL/L (ref 3.5–5.3)
RBC # BLD AUTO: 4.25 MILLION/UL (ref 3.88–5.62)
SODIUM SERPL-SCNC: 138 MMOL/L (ref 135–147)
WBC # BLD AUTO: 6.35 THOUSAND/UL (ref 4.31–10.16)

## 2025-01-09 PROCEDURE — 80048 BASIC METABOLIC PNL TOTAL CA: CPT | Performed by: PHYSICIAN ASSISTANT

## 2025-01-09 PROCEDURE — 85025 COMPLETE CBC W/AUTO DIFF WBC: CPT | Performed by: PHYSICIAN ASSISTANT

## 2025-01-09 PROCEDURE — 99239 HOSP IP/OBS DSCHRG MGMT >30: CPT | Performed by: PHYSICIAN ASSISTANT

## 2025-01-09 PROCEDURE — 82948 REAGENT STRIP/BLOOD GLUCOSE: CPT

## 2025-01-09 PROCEDURE — 99232 SBSQ HOSP IP/OBS MODERATE 35: CPT | Performed by: INTERNAL MEDICINE

## 2025-01-09 PROCEDURE — G0545 PR INHERENT VISIT TO INPT: HCPCS | Performed by: INTERNAL MEDICINE

## 2025-01-09 RX ORDER — ATORVASTATIN CALCIUM 40 MG/1
40 TABLET, FILM COATED ORAL
Start: 2025-01-09

## 2025-01-09 RX ADMIN — Medication 1 TABLET: at 08:57

## 2025-01-09 RX ADMIN — INSULIN LISPRO 5 UNITS: 100 INJECTION, SOLUTION INTRAVENOUS; SUBCUTANEOUS at 08:56

## 2025-01-09 RX ADMIN — PANTOPRAZOLE SODIUM 40 MG: 40 TABLET, DELAYED RELEASE ORAL at 06:16

## 2025-01-09 RX ADMIN — PENTOXIFYLLINE 400 MG: 400 TABLET, EXTENDED RELEASE ORAL at 08:55

## 2025-01-09 RX ADMIN — PREGABALIN 150 MG: 75 CAPSULE ORAL at 08:57

## 2025-01-09 RX ADMIN — CLOPIDOGREL BISULFATE 75 MG: 75 TABLET ORAL at 08:57

## 2025-01-09 RX ADMIN — METOPROLOL SUCCINATE 100 MG: 100 TABLET, EXTENDED RELEASE ORAL at 08:57

## 2025-01-09 RX ADMIN — HEPARIN SODIUM 5000 UNITS: 5000 INJECTION, SOLUTION INTRAVENOUS; SUBCUTANEOUS at 06:16

## 2025-01-09 RX ADMIN — ISOSORBIDE MONONITRATE 90 MG: 60 TABLET, EXTENDED RELEASE ORAL at 08:57

## 2025-01-09 RX ADMIN — INSULIN LISPRO 5 UNITS: 100 INJECTION, SOLUTION INTRAVENOUS; SUBCUTANEOUS at 12:01

## 2025-01-09 RX ADMIN — DULOXETINE HYDROCHLORIDE 30 MG: 30 CAPSULE, DELAYED RELEASE ORAL at 08:59

## 2025-01-09 RX ADMIN — PENTOXIFYLLINE 400 MG: 400 TABLET, EXTENDED RELEASE ORAL at 12:00

## 2025-01-09 RX ADMIN — OMEGA-3 FATTY ACIDS CAP 1000 MG 1000 MG: 1000 CAP at 08:57

## 2025-01-09 RX ADMIN — AMLODIPINE BESYLATE 2.5 MG: 2.5 TABLET ORAL at 08:57

## 2025-01-09 RX ADMIN — RANOLAZINE 1000 MG: 500 TABLET, FILM COATED, EXTENDED RELEASE ORAL at 08:58

## 2025-01-09 RX ADMIN — TORSEMIDE 20 MG: 20 TABLET ORAL at 08:57

## 2025-01-09 RX ADMIN — INSULIN LISPRO 1 UNITS: 100 INJECTION, SOLUTION INTRAVENOUS; SUBCUTANEOUS at 12:00

## 2025-01-09 RX ADMIN — ASPIRIN 81 MG CHEWABLE TABLET 81 MG: 81 TABLET CHEWABLE at 08:57

## 2025-01-09 RX ADMIN — FENOFIBRATE 145 MG: 145 TABLET ORAL at 08:59

## 2025-01-09 NOTE — ASSESSMENT & PLAN NOTE
Lab Results   Component Value Date    HGBA1C 6.2 (H) 10/14/2024       Recent Labs     01/08/25  1122 01/08/25  1646 01/08/25 2058 01/09/25  0731   POCGLU 152* 91 144* 117       Blood Sugar Average: Last 72 hrs:  (P) [id number]  This is risk factor for poor wound healing.  Management per primary service.    Discussed with patient in detail regarding the above plan.

## 2025-01-09 NOTE — ASSESSMENT & PLAN NOTE
Polymicrobial bacteremia, with growth of MRSE and Streptococcus parasanguinous in what is most likely a single admission blood draw.  Patient has very poor blood draw and IV access and does recall the nursing staff struggling to obtain blood cultures from him.  Repeat blood cultures have no growth.  Patient does have a PPM.  However, patient is clearly not septic clinically.  Repeat blood cultures have no growth.  Patient had been on IV vancomycin but this was discontinued yesterday.  He remains clinically well off antibiotic.  Patient should get surveillance blood cultures to confirm absence of true bacteremia.  Observe off further antibiotic.  Repeat surveillance blood cultures 2 to 3 weeks after antibiotic discontinuation.

## 2025-01-09 NOTE — PLAN OF CARE
Problem: PAIN - ADULT  Goal: Verbalizes/displays adequate comfort level or baseline comfort level  Description: Interventions:  - Encourage patient to monitor pain and request assistance  - Assess pain using appropriate pain scale  - Administer analgesics based on type and severity of pain and evaluate response  - Implement non-pharmacological measures as appropriate and evaluate response  - Consider cultural and social influences on pain and pain management  - Notify physician/advanced practitioner if interventions unsuccessful or patient reports new pain  Outcome: Progressing     Problem: INFECTION - ADULT  Goal: Absence or prevention of progression during hospitalization  Description: INTERVENTIONS:  - Assess and monitor for signs and symptoms of infection  - Monitor lab/diagnostic results  - Monitor all insertion sites, i.e. indwelling lines, tubes, and drains  - Monitor endotracheal if appropriate and nasal secretions for changes in amount and color  - [place] appropriate cooling/warming therapies per order  - Administer medications as ordered  - Instruct and encourage patient and family to use good hand hygiene technique  - Identify and instruct in appropriate isolation precautions for identified infection/condition  Outcome: Progressing  Goal: Absence of fever/infection during neutropenic period  Description: INTERVENTIONS:  - Monitor WBC    Outcome: Progressing     Problem: SAFETY ADULT  Goal: Patient will remain free of falls  Description: INTERVENTIONS:  - Educate patient/family on patient safety including physical limitations  - Instruct patient to call for assistance with activity   - Consult OT/PT to assist with strengthening/mobility   - Keep Call bell within reach  - Keep bed low and locked with side rails adjusted as appropriate  - Keep care items and personal belongings within reach  - Initiate and maintain comfort rounds  - Make Fall Risk Sign visible to staff  - Offer Toileting every 4 Hours,  in advance of need  - Initiate/Maintain 4alarm  - Obtain necessary fall risk management equipment: 4  - Apply yellow socks and bracelet for high fall risk patients  - Consider moving patient to room near nurses station  Outcome: Progressing  Goal: Maintain or return to baseline ADL function  Description: INTERVENTIONS:  -  Assess patient's ability to carry out ADLs; assess patient's baseline for ADL function and identify physical deficits which impact ability to perform ADLs (bathing, care of mouth/teeth, toileting, grooming, dressing, etc.)  - Assess/evaluate cause of self-care deficits   - Assess range of motion  - Assess patient's mobility; develop plan if impaired  - Assess patient's need for assistive devices and provide as appropriate  - Encourage maximum independence but intervene and supervise when necessary  - Involve family in performance of ADLs  - Assess for home care needs following discharge   - Consider OT consult to assist with ADL evaluation and planning for discharge  - Provide patient education as appropriate  Outcome: Progressing  Goal: Maintains/Returns to pre admission functional level  Description: INTERVENTIONS:  - Perform AM-PAC 6 Click Basic Mobility/ Daily Activity assessment daily.  - Set and communicate daily mobility goal to care team and patient/family/caregiver.   - Collaborate with rehabilitation services on mobility goals if consulted  - Perform Range of Motion 4 times a day.  - Reposition patient every 4 hours.  - Dangle patient 4 times a day  - Stand patient 4 times a day  - Ambulate patient 4 times a day  - Out of bed to chair 4 times a day   - Out of bed for meals 4 times a day  - Out of bed for toileting  - Record patient progress and toleration of activity level   Outcome: Progressing     Problem: DISCHARGE PLANNING  Goal: Discharge to home or other facility with appropriate resources  Description: INTERVENTIONS:  - Identify barriers to discharge w/patient and caregiver  -  Arrange for needed discharge resources and transportation as appropriate  - Identify discharge learning needs (meds, wound care, etc.)  - Arrange for interpretive services to assist at discharge as needed  - Refer to Case Management Department for coordinating discharge planning if the patient needs post-hospital services based on physician/advanced practitioner order or complex needs related to functional status, cognitive ability, or social support system  Outcome: Progressing     Problem: Knowledge Deficit  Goal: Patient/family/caregiver demonstrates understanding of disease process, treatment plan, medications, and discharge instructions  Description: Complete learning assessment and assess knowledge base.  Interventions:  - Provide teaching at level of understanding  - Provide teaching via preferred learning methods  Outcome: Progressing     Problem: SKIN/TISSUE INTEGRITY - ADULT  Goal: Skin Integrity remains intact(Skin Breakdown Prevention)  Description: Assess:  -Perform Dev assessment every 4  -Clean and moisturize skin every 4  -Inspect skin when repositioning, toileting, and assisting with ADLS  -Assess under medical devices such as 4 every 4  -Assess extremities for adequate circulation and sensation     Bed Management:  -Have minimal linens on bed & keep smooth, unwrinkled  -Change linens as needed when moist or perspiring  -Avoid sitting or lying in one position for more than 4 hours while in bed  -Keep HOB at 4degrees     Toileting:  -Offer bedside commode  -Assess for incontinence every 4  -Use incontinent care products after each incontinent episode such as 4    Activity:  -Mobilize patient 4 times a day  -Encourage activity and walks on unit  -Encourage or provide ROM exercises   -Turn and reposition patient every 4 Hours  -Use appropriate equipment to lift or move patient in bed  -Instruct/ Assist with weight shifting every 4 when out of bed in chair  -Consider limitation of chair time 4 hour  intervals    Skin Care:  -Avoid use of baby powder, tape, friction and shearing, hot water or constrictive clothing  -Relieve pressure over bony prominences using 4  -Do not massage red bony areas    Next Steps:  -Teach patient strategies to minimize risks such as 4   -Consider consults to  interdisciplinary teams such as 4  Outcome: Progressing  Goal: Incision(s), wounds(s) or drain site(s) healing without S/S of infection  Description: INTERVENTIONS  - Assess and document dressing, incision, wound bed, drain sites and surrounding tissue  - Provide patient and family education  - Perform skin care/dressing changes every 4  Outcome: Progressing  Goal: Pressure injury heals and does not worsen  Description: Interventions:  - Implement low air loss mattress or specialty surface (Criteria met)  - Apply silicone foam dressing  - Instruct/assist with weight shifting every 4 minutes when in chair   - Limit chair time to 4 hour intervals  - Use special pressure reducing interventions such as 4 when in chair   - Apply fecal or urinary incontinence containment device   - Perform passive or active ROM every 4  - Turn and reposition patient & offload bony prominences every 4 hours   - Utilize friction reducing device or surface for transfers   - Consider consults to  interdisciplinary teams such as 4  - Use incontinent care products after each incontinent episode such as 4  - Consider nutrition services referral as needed  Outcome: Progressing     Problem: Nutrition/Hydration-ADULT  Goal: Nutrient/Hydration intake appropriate for improving, restoring or maintaining nutritional needs  Description: Monitor and assess patient's nutrition/hydration status for malnutrition. Collaborate with interdisciplinary team and initiate plan and interventions as ordered.  Monitor patient's weight and dietary intake as ordered or per policy. Utilize nutrition screening tool and intervene as necessary. Determine patient's food preferences and  provide high-protein, high-caloric foods as appropriate.     INTERVENTIONS:  - Monitor oral intake, urinary output, labs, and treatment plans  - Assess nutrition and hydration status and recommend course of action  - Evaluate amount of meals eaten  - Assist patient with eating if necessary   - Allow adequate time for meals  - Recommend/ encourage appropriate diets, oral nutritional supplements, and vitamin/mineral supplements  - Order, calculate, and assess calorie counts as needed  - Recommend, monitor, and adjust tube feedings and TPN/PPN based on assessed needs  - Assess need for intravenous fluids  - Provide specific nutrition/hydration education as appropriate  - Include patient/family/caregiver in decisions related to nutrition  Outcome: Progressing

## 2025-01-09 NOTE — ASSESSMENT & PLAN NOTE
Follow-up with cardiology  Most recent echo from 12/13/2024 moderate to severe aortic stenosis, EF of 50%  Some minimal edema on exam, without overt CHF.  Continue diuretic and trend volume status as an outpatient

## 2025-01-09 NOTE — PROGRESS NOTES
Progress Note - Nephrology   Thomas Horne 79 y.o. male MRN: [id number]  Unit/Bed#: -01 Encounter: [id number]      Assessment & Plan  Acute kidney injury superimposed on stage 3b chronic kidney disease (HCC)  IVY most likely secondary to ischemic injury secondary to hypotension, prerenal azotemia in the presence of Jardiance and torsemide plus failure to autoregulate and in the presence of losartan--less likely component of IC GN in light of bacteremia.  After review of records it appears that the patient has a baseline Creatinine of 1.6-1.8 mg/dL.  Slight increase in the creatinine over the last 24 hours likely reflecting resumption of diuretics but overall not much change  Follows with Dr. Fischer as an outpatient.  Next appointment is 1/21  Continue torsemide  Restart Jardiance  Continue to hold losartan on discharge  Primary hypertension  Blood pressure is currently acceptable.  Home medications: Torsemide 20 mg p.o. daily, Norvasc 2.5 mg p.o. daily, Jardiance 25 mg p.o. daily, losartan 25 mg p.o. daily, Toprol- mg p.o. daily, Imdur 90 mg p.o. daily,  Continue to hold losartan on discharge  Resume Jardiance and trend blood pressure as an outpatient  Coronary artery disease involving native coronary artery of native heart without angina pectoris  Follow-up with cardiology  Most recent echo from 12/13/2024 moderate to severe aortic stenosis, EF of 50%  Some minimal edema on exam, without overt CHF.  Continue diuretic and trend volume status as an outpatient  PAD (peripheral artery disease) ([facility])  Follow-up with vascular  Status post angioplasty/stenting on 1/7  Gram-positive bacteremia  Off antibiotics, see ID note from today  Chronic diastolic congestive heart failure (HCC)  Volume status seems acceptable to minimally elevated.  Continue diuretic and trend volume status as well as renal function  Type 2 diabetes mellitus with diabetic polyneuropathy, with long-term current use of insulin (HCC)  Lab  Results   Component Value Date    HGBA1C 6.2 (H) 10/14/2024       Recent Labs     25  1122 25  1646 25  0731   POCGLU 152* 91 144* 117       Blood Sugar Average: Last 72 hrs:  (P) [id number]  Optimize glycemic control  Diabetic ulcer of right midfoot associated with diabetes mellitus due to underlying condition, limited to breakdown of skin (HCC)  Lab Results   Component Value Date    HGBA1C 6.2 (H) 10/14/2024       Recent Labs     25  1122 25  1646 25  0731   POCGLU 152* 91 144* 117       Blood Sugar Average: Last 72 hrs:  (P) [id number]       Case was discussed with and care coordinated with the hospitalist team.  There was agreement to resume Jardiance and continue continue current diuretic regimen as well as with holding losartan on discharge          Subjective:   The patient was seen and examined.  He denied any shortness of breath, chest pain or pressure, abdominal pain, vomiting, diarrhea, sweats, chills, paroxysmal atrial dyspnea orthopnea.      Objective:     Vitals: Blood pressure 116/61, pulse 70, temperature 97.9 °F (36.6 °C), resp. rate 16, SpO2 95%.,There is no height or weight on file to calculate BMI.Temp (24hrs), Av.9 °F (36.6 °C), Min:97.3 °F (36.3 °C), Max:98.1 °F (36.7 °C)      Temp:  [97.3 °F (36.3 °C)-98.1 °F (36.7 °C)] 97.9 °F (36.6 °C)  HR:  [69-72] 70  Resp:  [16] 16  BP: (114-117)/(58-61) 116/61  SpO2:  [95 %-98 %] 95 %    Weight (last 2 days)       None                 I/O last 24 hours:  In: 1132 [P.O.:1132]  Out: 2350 [Urine:2350]        Physical Exam    /61   Pulse 70   Temp 97.9 °F (36.6 °C)   Resp 16   SpO2 95%   Vital signs were reviewed  Constitutional: The patient was awake, alert, pleasant, cooperative in no apparent distress  Cardiovascular: No overt jugular venous tension was noted, S1-S2, no pericardial friction rub S3 appreciated, trace peripheral edema mostly about the  ankles  Pulmonary: Adequate inspiratory effort, lungs were clear                Invasive Devices       Peripheral Intravenous Line  Duration             Peripheral IV 01/09/25 Dorsal (posterior);Right Hand <1 day                    Medications:    Scheduled Meds:  Current Facility-Administered Medications   Medication Dose Route Frequency Provider Last Rate    acetaminophen  975 mg Oral Q6H PRN Belkis Howard PA-C      amLODIPine  2.5 mg Oral Daily Felecia Louie MD      aspirin  81 mg Oral Daily Razia Dumont PA-C      atorvastatin  40 mg Oral HS Felecia Louie MD      clopidogrel  75 mg Oral Daily Felecia Louie MD      DULoxetine  30 mg Oral Daily Felecia Louie MD      fenofibrate  145 mg Oral Daily Felecia Louie MD      fish oil  1,000 mg Oral BID Felecia Louie MD      heparin (porcine)  5,000 Units Subcutaneous Q8H [facility] Felecia Louie MD      HYDROmorphone  0.5 mg Intravenous Q4H PRN Felecia Louie MD      insulin glargine  15 Units Subcutaneous HS Cinthya Sagastume PA-C      insulin lispro  1-5 Units Subcutaneous TID AC Felecia Louie MD      insulin lispro  1-5 Units Subcutaneous HS Felecia Louie MD      insulin lispro  5 Units Subcutaneous TID With Meals Cinthya Sagastume PA-C      isosorbide mononitrate  90 mg Oral Daily Felecia Louie MD      metoprolol succinate  100 mg Oral Daily Felecia Louie MD      multivitamin-minerals  1 tablet Oral Daily Felecia Louie MD      nitroglycerin  0.4 mg Sublingual Q5 Min PRN Felecia Louie MD      oxyCODONE  5 mg Oral Q12H PRN Felecia Louie MD      pantoprazole  40 mg Oral Daily Before Breakfast Felecia Louie MD      pentoxifylline  400 mg Oral TID With Meals Felecia Louie MD      pregabalin  150 mg Oral TID Felecia Louie MD      ranolazine  1,000 mg Oral BID Felecia Louie MD      torsemide  20 mg Oral Daily Torey Morrell, DO         PRN Meds:.  acetaminophen    HYDROmorphone    nitroglycerin     oxyCODONE    Continuous Infusions:         LAB RESULTS:      Results from last 7 days   Lab Units 01/09/25  0439 01/08/25  0458 01/07/25  1908 01/07/25  0550 01/06/25  0524 01/05/25  0647 01/04/25  0651 01/03/25  1459   WBC Thousand/uL 6.35 6.31 6.01 5.50 6.13 12.36* 13.98* 10.06   HEMOGLOBIN g/dL 11.4* 11.4* 12.4 11.5* 11.6* 12.8 11.9* 14.3   HEMATOCRIT % 35.5* 36.0* 39.1 36.9 35.6* 39.5 36.4* 45.3   PLATELETS Thousands/uL 185 161 152 152 159 182 224 307   SEGS PCT % 62  --   --   --  87*  --   --  78*   LYMPHO PCT % 24  --   --   --  5* 0*  --  14   MONO PCT % 9  --   --   --  5 0*  --  6   EOS PCT % 5  --   --   --  2 2  --  1   POTASSIUM mmol/L 3.5 3.7 3.6 3.7 3.4* 3.7 3.8 4.5   CHLORIDE mmol/L 100 103 102 100 97 98 100 100   CO2 mmol/L 30 27 28 27 27 25 28 30   BUN mg/dL 31* 28* 29* 38* 44* 47* 42* 34*   CREATININE mg/dL 1.30 1.15 1.32* 1.84* 2.03* 2.75* 2.45* 1.73*   CALCIUM mg/dL 9.0 8.1* 8.3* 8.3* 8.4 8.5 8.5 9.9   MAGNESIUM mg/dL  --   --   --   --  1.8*  --  1.5*  --    PHOSPHORUS mg/dL  --   --   --   --  2.3  --   --   --        CUTURES:  Lab Results   Component Value Date    BLOODCX No Growth After 4 Days. 01/04/2025    BLOODCX No Growth After 4 Days. 01/04/2025    BLOODCX Streptococcus parasanguinis (A) 01/03/2025    BLOODCX Staphylococcus epidermidis (A) 01/03/2025    BLOODCX Streptococcus parasanguinis (A) 01/03/2025    BLOODCX No Growth After 5 Days. 06/16/2023    BLOODCX No Growth After 5 Days. 06/16/2023                 PLEASE NOTE:  This encounter was completed utilizing the [employer] Direct Speech Voice Recognition Software. Grammatical errors, random word insertions, pronoun errors and incomplete sentences are occasional consequences of the system due to software limitations, ambient noise and hardware issues.These may be missed by proof reading prior to affixing electronic signature. Any questions or concerns about the content, text or information contained within the body of this  dictation should be directly addressed to the physician for clarification. Please do not hesitate to call me directly if you have any any questions or concerns.

## 2025-01-09 NOTE — ASSESSMENT & PLAN NOTE
Wt Readings from Last 3 Encounters:   01/03/25 99.9 kg (220 lb 4 oz)   12/23/24 104 kg (230 lb)   12/23/24 104 kg (229 lb)   Echo (12/13/24) - EF 50%, abnormal diastolic function  Torsemide held and receiving gentle IV fluids per nephrology with IVY -- now resumed  Resume Jardiance   Monitor volume status with daily weights, I&Os

## 2025-01-09 NOTE — ASSESSMENT & PLAN NOTE
Patient was sent to the ED at [facility] on 1/3/25 after wound care noted worsening of his right foot ulcer; note underlying history of PAD. Evaluated by vascular surgery in the ED and transferred to [facility] for angiogram and possible bypass redo.  IR consulted and s/p agram with subintimal crossing of fem-pop occlusion with outback device, angioplasty/stenting with 6mm Usha stent on 1/7   Repeat FABIOLA improved following procedure, recommending outpt vascular f/u.    Podiatry following, continue local wound care.  Cleared from podiatry standpoint for discharge   ID following, was on IV vanco with concern for positive blood cultures as below.  Feel this represents contaminant and can d/c abx.  +wound culture is colonization and without cellulitis does not require antibiotic for this.    Continue ASA, Plavix, statin

## 2025-01-09 NOTE — ASSESSMENT & PLAN NOTE
This is likely multifactorial, secondary to IVY and foot/toe ischemia.  Mental status is back to normal.  Monitor mental status.

## 2025-01-09 NOTE — DISCHARGE SUMMARY
Discharge Summary - Hospitalist   Name: Thomas Horne 79 y.o. male I MRN: [id number]  Unit/Bed#: -01 I Date of Admission: 1/4/2025   Date of Service: 1/9/2025 I Hospital Day: 5     Assessment & Plan  Diabetic ulcer of right midfoot associated with diabetes mellitus due to underlying condition, limited to breakdown of skin (HCC)  Patient was sent to the ED at [facility] on 1/3/25 after wound care noted worsening of his right foot ulcer; note underlying history of PAD. Evaluated by vascular surgery in the ED and transferred to [facility] for angiogram and possible bypass redo.  IR consulted and s/p agram with subintimal crossing of fem-pop occlusion with outback device, angioplasty/stenting with 6mm Usha stent on 1/7   Repeat FABIOLA improved following procedure, recommending outpt vascular f/u.    Podiatry following, continue local wound care.  Cleared from podiatry standpoint for discharge   ID following, was on IV vanco with concern for positive blood cultures as below.  Feel this represents contaminant and can d/c abx.  +wound culture is colonization and without cellulitis does not require antibiotic for this.    Continue ASA, Plavix, statin  Gram-positive bacteremia  2 of 2 blood cultures from 1/3/2025 positive for gram-positive cocci; culture with Staph epidermidis and also with Strep parasanguinis   Repeat blood cultures x 2 negative at 48 hrs  ID consult appreciated, suspect this may represent contaminant.  Since repeat cultures are negative discontinue abx.  Recommend repeat blood cultures in 2 to 3 weeks to ensure no growth as an outpt.   Acute kidney injury superimposed on stage 3b chronic kidney disease (HCC)  Lab Results   Component Value Date    EGFR 51 01/09/2025    EGFR 59 01/08/2025    EGFR 50 01/07/2025    CREATININE 1.30 01/09/2025    CREATININE 1.15 01/08/2025    CREATININE 1.32 (H) 01/07/2025   Noted with rise in creatinine to 2.45 on 1/4/2025 increased from baseline of  approximately 1.6-1.8  Cr peak of 2.7, now improved to 1.3  Appreciate nephrology consult and recommendations  S/p IV fluids for agram  Resume torsemide, continue holding losartan on discharge.  Outpt f/u with nephro  Acute metabolic encephalopathy  Family noting mild confusion 1/5/2025, no focal neurodeficits on exam  Likely multifactorial with above conditions, with component of hospital delirium  Delirium precautions, supportive cares, frequent reorientation  Appears resolved   Type 2 diabetes mellitus with diabetic polyneuropathy, with long-term current use of insulin ([facility])  Lab Results   Component Value Date    HGBA1C 6.2 (H) 10/14/2024     Recent Labs     01/08/25  1646 01/08/25  2058 01/09/25  0731 01/09/25  1128   POCGLU 91 144* 117 196*     Blood Sugar Average: Last 72 hrs:  (P) 122.3625677611688412    Resume home Rx at d/c.    Chronic diastolic congestive heart failure (HCC)  Wt Readings from Last 3 Encounters:   01/03/25 99.9 kg (220 lb 4 oz)   12/23/24 104 kg (230 lb)   12/23/24 104 kg (229 lb)   Echo (12/13/24) - EF 50%, abnormal diastolic function  Torsemide held and receiving gentle IV fluids per nephrology with IVY -- now resumed  Resume Jardiance   Monitor volume status with daily weights, I&Os  PAD (peripheral artery disease) ([facility])  History of bilateral lower extremity bypass   See above   Coronary artery disease involving native coronary artery of native heart without angina pectoris  S/p CABG x 3 in 2002 and PCI with drug-eluting stent to distal left circumflex in 2021  Continue Plavix, statin, BB, Ranexa, Imdur  Primary hypertension  Continue home dose Amlodipine and Metoprolol   Losartan remains held at dc  Resumed torsemide   History of DVT (deep vein thrombosis)  Not on anticoagulation at home currently  Lower extremity Doppler done on 12/13/2024 showed no DVT  S/P placement of cardiac pacemaker  Recent PPM placement on 12/16/2024 noted  Mixed hyperlipidemia  Continue statin, fish oil,  fenofibrate     Medical Problems       Resolved Problems  Date Reviewed: 12/23/2024   None       Discharging Physician / Practitioner: Deepthi Salguero PA-C  PCP: Frank Lombardi, DO  Admission Date:   Admission Orders (From admission, onward)       Ordered        01/04/25 2053  INPATIENT ADMISSION  Once                          Discharge Date: 01/09/25    Consultations During Hospital Stay:  IR  Vascular surgery  Infectious disease  Nephrology  Podiatry    Procedures Performed:   Agram 1/7    Significant Findings / Test Results:   As above     Incidental Findings:   None     Test Results Pending at Discharge (will require follow up):   None     Outpatient Tests Requested:  Blood culture for surveillance     Complications:  none    Reason for Admission: foot ulcer/PAD     Hospital Course:   Thomas Horne is a 79 y.o. male patient who originally presented to the hospital on 1/4/2025 due to foot ulcer, with underlying PAD.      He had multiple consultants including IR, vascular surgery, ID, podiatry and nephrology.    S/p RLE Agram antegrade access, subintimal crossing fem-pop occlusion, re-entry P1 segement w/ Outback device, PTA/Usha stenting.  Vascular surgery have since signed off and will follow him in the outpatient setting.    Matias infectious disease did not feel to be acutely infected.  Positive blood cultures likely represent contaminant and not true bacteremia.  Regardless, do recommend surveillance blood cultures in 2 to 3 weeks in the outpatient setting.  He expressed understanding of this.    He he was seen by nephrology to optimize renal function in setting of a gram procedure.  At this time his diuretics and Jardiance have been resumed, continue to hold losartan at discharge until he can follow-up with nephrology outpatient later this month.    He is stable for discharge with outpatient follow-up    Please refer to individual assessment and plan above and individual progress notes for further details  regarding his hospital stay.      Please see above list of diagnoses and related plan for additional information.     Condition at Discharge: good    Discharge Day Visit / Exam:   Subjective:  No complaints, doing well, pain controlled.  Eating/drinking ok.  Going to bathroom ok   Vitals: Blood Pressure: 116/61 (01/09/25 0730)  Pulse: 70 (01/09/25 0730)  Temperature: 97.9 °F (36.6 °C) (01/09/25 0730)  Temp Source: Oral (01/08/25 1531)  Respirations: 16 (01/08/25 1939)  SpO2: 95 % (01/09/25 0730)  Physical Exam  Vitals reviewed.   Constitutional:       General: He is not in acute distress.     Appearance: He is not toxic-appearing.   HENT:      Head: Normocephalic and atraumatic.   Eyes:      Extraocular Movements: Extraocular movements intact.   Cardiovascular:      Rate and Rhythm: Normal rate.   Pulmonary:      Effort: Pulmonary effort is normal. No respiratory distress.   Musculoskeletal:         General: Normal range of motion.   Neurological:      General: No focal deficit present.      Mental Status: He is alert and oriented to person, place, and time.   Psychiatric:         Mood and Affect: Mood normal.         Behavior: Behavior normal.        Discussion with Family: Patient declined call to .     Discharge instructions/Information to patient and family:   See after visit summary for information provided to patient and family.      Provisions for Follow-Up Care:  See after visit summary for information related to follow-up care and any pertinent home health orders.      Mobility at time of Discharge:   Basic Mobility Inpatient Raw Score: 18  JH-HLM Goal: 6: Walk 10 steps or more  JH-HLM Achieved: 6: Walk 10 steps or more  HLM Goal achieved. Continue to encourage appropriate mobility.     Disposition:   Home    Planned Readmission: no    Discharge Medications:  See after visit summary for reconciled discharge medications provided to patient and/or family.      Administrative Statements    Discharge Statement:  I have spent a total time of 32 minutes in caring for this patient on the day of the visit/encounter. >30 minutes of time was spent on: Patient and family education, Counseling / Coordination of care, Documenting in the medical record, Reviewing / ordering tests, medicine, procedures  , and Communicating with other healthcare professionals .    **Please Note: This note may have been constructed using a voice recognition system**

## 2025-01-09 NOTE — ASSESSMENT & PLAN NOTE
Lab Results   Component Value Date    HGBA1C 6.2 (H) 10/14/2024     Recent Labs     01/08/25  1646 01/08/25 2058 01/09/25  0731 01/09/25  1128   POCGLU 91 144* 117 196*     Blood Sugar Average: Last 72 hrs:  (P) 122.6486691181450498    Resume home Rx at d/c.

## 2025-01-09 NOTE — ASSESSMENT & PLAN NOTE
Patient has extensive and severe PAD, status post successful revascularization.  .  Vascular surgery follow-up.

## 2025-01-09 NOTE — PROGRESS NOTES
Progress Note - Infectious Disease   Name: Thomas Horne 79 y.o. male I MRN: [id number]  Unit/Bed#: -01 I Date of Admission: 1/4/2025   Date of Service: 1/9/2025 I Hospital Day: 5    Assessment & Plan  Gram-positive bacteremia  Polymicrobial bacteremia, with growth of MRSE and Streptococcus parasanguinous in what is most likely a single admission blood draw.  Patient has very poor blood draw and IV access and does recall the nursing staff struggling to obtain blood cultures from him.  Repeat blood cultures have no growth.  Patient does have a PPM.  However, patient is clearly not septic clinically.  Repeat blood cultures have no growth.  Patient had been on IV vancomycin but this was discontinued yesterday.  He remains clinically well off antibiotic.  Patient should get surveillance blood cultures to confirm absence of true bacteremia.  Observe off further antibiotic.  Repeat surveillance blood cultures 2 to 3 weeks after antibiotic discontinuation.  Diabetic ulcer of right midfoot associated with diabetes mellitus due to underlying condition, limited to breakdown of skin ([facility])  Ulcer appears to be ischemic on examination, without any signs of cellulitis.  Arteriogram is pending for next week.  No antibiotic needed for this indication.  Superficial wound culture is most likely skin colonization.  No antibiotic needed for this indication.  Serial foot exams.  PAD (peripheral artery disease) ([facility])  Patient has extensive and severe PAD, status post successful revascularization.  .  Vascular surgery follow-up.  S/P placement of cardiac pacemaker  If patient does indeed have true bacteremia, there would be some concern for PPM infection.  However, at present, patient does not appear to have an active infection or true bacteremia, as in above.  Surveillance blood cultures, as in above.  Acute kidney injury superimposed on stage 3b chronic kidney disease (HCC)  Lab Results   Component Value Date    EGFR 51 01/09/2025     EGFR 59 01/08/2025    EGFR 50 01/07/2025    CREATININE 1.30 01/09/2025    CREATININE 1.15 01/08/2025    CREATININE 1.32 (H) 01/07/2025   Creatinine improving.  Monitor creatinine.  Acute metabolic encephalopathy  This is likely multifactorial, secondary to IVY and foot/toe ischemia.  Mental status is back to normal.  Monitor mental status.  Type 2 diabetes mellitus with diabetic polyneuropathy, with long-term current use of insulin ([facility])  Lab Results   Component Value Date    HGBA1C 6.2 (H) 10/14/2024       Recent Labs     01/08/25  1122 01/08/25  1646 01/08/25 2058 01/09/25  0731   POCGLU 152* 91 144* 117       Blood Sugar Average: Last 72 hrs:  (P) 117.1322195991695076  This is risk factor for poor wound healing.  Management per primary service.    Discussed with patient in detail regarding the above plan.  Okay for discharge from ID viewpoint.  As long as surveillance blood cultures have no growth, no follow-up with us is necessary.      Antibiotics:  Off antibiotic    Subjective   Patient feels well.  No pain in foot.  Temperature stays down.  No chills.  No diarrhea..    Objective :  Temp:  [97.3 °F (36.3 °C)-98.1 °F (36.7 °C)] 97.9 °F (36.6 °C)  HR:  [69-72] 70  BP: (114-117)/(58-61) 116/61  Resp:  [16] 16  SpO2:  [95 %-98 %] 95 %  O2 Device: None (Room air)    General:  No acute distress  Psychiatric:  Awake and alert  Pulmonary:  Normal respiratory excursion without accessory muscle use  Abdomen:  Soft, nontender  Extremities: Stable chronic foot ulcer.  No purulence.  No erythema/warmth.  Nontender.  Foot is warm.  Skin:  No rashes      Lab Results: I have reviewed the following results:  Results from last 7 days   Lab Units 01/09/25  0439 01/08/25  0458 01/07/25  1908   WBC Thousand/uL 6.35 6.31 6.01   HEMOGLOBIN g/dL 11.4* 11.4* 12.4   PLATELETS Thousands/uL 185 161 152     Results from last 7 days   Lab Units 01/09/25  0439 01/08/25  0458 01/07/25  1908   SODIUM mmol/L 138 138 139   POTASSIUM mmol/L  3.5 3.7 3.6   CHLORIDE mmol/L 100 103 102   CO2 mmol/L 30 27 28   BUN mg/dL 31* 28* 29*   CREATININE mg/dL 1.30 1.15 1.32*   EGFR ml/min/1.73sq m 51 59 50   CALCIUM mg/dL 9.0 8.1* 8.3*     Results from last 7 days   Lab Units 01/04/25  2217 01/03/25  2035 01/03/25  1513   BLOOD CULTURE  No Growth After 4 Days.  No Growth After 4 Days. Streptococcus parasanguinis*  Streptococcus parasanguinis*  Staphylococcus epidermidis*  --    GRAM STAIN RESULT   --  Gram positive cocci in chains*  Gram positive cocci in chains and clusters* No Polys or Bacteria seen   WOUND CULTURE   --   --  1+ Growth of Staphylococcus aureus*  1+ Growth of         Results from last 7 days   Lab Units 01/03/25  1459   CRP mg/L 10.7*

## 2025-01-09 NOTE — ASSESSMENT & PLAN NOTE
Not on anticoagulation at home currently  Lower extremity Doppler done on 12/13/2024 showed no DVT

## 2025-01-09 NOTE — ASSESSMENT & PLAN NOTE
Blood pressure is currently acceptable.  Home medications: Torsemide 20 mg p.o. daily, Norvasc 2.5 mg p.o. daily, Jardiance 25 mg p.o. daily, losartan 25 mg p.o. daily, Toprol- mg p.o. daily, Imdur 90 mg p.o. daily,  Continue to hold losartan on discharge  Resume Jardiance and trend blood pressure as an outpatient

## 2025-01-09 NOTE — ASSESSMENT & PLAN NOTE
Lab Results   Component Value Date    HGBA1C 6.2 (H) 10/14/2024       Recent Labs     01/08/25  1122 01/08/25  1646 01/08/25 2058 01/09/25  0731   POCGLU 152* 91 144* 117       Blood Sugar Average: Last 72 hrs:  (P) 117.9121234480045100

## 2025-01-09 NOTE — PLAN OF CARE
Problem: PAIN - ADULT  Goal: Verbalizes/displays adequate comfort level or baseline comfort level  Description: Interventions:  - Encourage patient to monitor pain and request assistance  - Assess pain using appropriate pain scale  - Administer analgesics based on type and severity of pain and evaluate response  - Implement non-pharmacological measures as appropriate and evaluate response  - Consider cultural and social influences on pain and pain management  - Notify physician/advanced practitioner if interventions unsuccessful or patient reports new pain  Outcome: Progressing     Problem: INFECTION - ADULT  Goal: Absence or prevention of progression during hospitalization  Description: INTERVENTIONS:  - Assess and monitor for signs and symptoms of infection  - Monitor lab/diagnostic results  - Monitor all insertion sites, i.e. indwelling lines, tubes, and drains  - Monitor endotracheal if appropriate and nasal secretions for changes in amount and color  - [place] appropriate cooling/warming therapies per order  - Administer medications as ordered  - Instruct and encourage patient and family to use good hand hygiene technique  - Identify and instruct in appropriate isolation precautions for identified infection/condition  Outcome: Progressing  Goal: Absence of fever/infection during neutropenic period  Description: INTERVENTIONS:  - Monitor WBC    Outcome: Progressing     Problem: SAFETY ADULT  Goal: Patient will remain free of falls  Description: INTERVENTIONS:  - Educate patient/family on patient safety including physical limitations  - Instruct patient to call for assistance with activity   - Consult OT/PT to assist with strengthening/mobility   - Keep Call bell within reach  - Keep bed low and locked with side rails adjusted as appropriate  - Keep care items and personal belongings within reach  - Initiate and maintain comfort rounds  - Make Fall Risk Sign visible to staff  - Offer Toileting every 2 Hours,  in advance of need  - Initiate/Maintain bed/chair alarm  - Obtain necessary fall risk management equipment: slippers  Problem: DISCHARGE PLANNING  Goal: Discharge to home or other facility with appropriate resources  Description: INTERVENTIONS:  - Identify barriers to discharge w/patient and caregiver  - Arrange for needed discharge resources and transportation as appropriate  - Identify discharge learning needs (meds, wound care, etc.)  - Arrange for interpretive services to assist at discharge as needed  - Refer to Case Management Department for coordinating discharge planning if the patient needs post-hospital services based on physician/advanced practitioner order or complex needs related to functional status, cognitive ability, or social support system  Outcome: Progressing     Problem: Knowledge Deficit  Goal: Patient/family/caregiver demonstrates understanding of disease process, treatment plan, medications, and discharge instructions  Description: Complete learning assessment and assess knowledge base.  Interventions:  - Provide teaching at level of understanding  - Provide teaching via preferred learning methods  Outcome: Progressing     - Apply yellow socks and bracelet for high fall risk patients  - Consider moving patient to room near nurses station  Outcome: Progressing

## 2025-01-09 NOTE — ASSESSMENT & PLAN NOTE
Lab Results   Component Value Date    HGBA1C 6.2 (H) 10/14/2024       Recent Labs     01/08/25  1122 01/08/25  1646 01/08/25 2058 01/09/25  0731   POCGLU 152* 91 144* 117       Blood Sugar Average: Last 72 hrs:  (P) 117.6144927314415452  Optimize glycemic control

## 2025-01-09 NOTE — CONSULTS
Vancomycin IV Pharmacy-to-Dose Consultation    Thomas Horne is a 79 y.o. male who was receiving Vancomycin IV with management by the Pharmacy Consult service.       The patient's Vancomycin therapy has been discontinued. Thank you for allowing us to take part in this patient's care. Pharmacy will sign-off now; please call or re-consult if there are any questions.        Marilyn Ramirez, PharmD  Pharmacist

## 2025-01-09 NOTE — ASSESSMENT & PLAN NOTE
Lab Results   Component Value Date    EGFR 51 01/09/2025    EGFR 59 01/08/2025    EGFR 50 01/07/2025    CREATININE 1.30 01/09/2025    CREATININE 1.15 01/08/2025    CREATININE 1.32 (H) 01/07/2025   Noted with rise in creatinine to 2.45 on 1/4/2025 increased from baseline of approximately 1.6-1.8  Cr peak of 2.7, now improved to 1.3  Appreciate nephrology consult and recommendations  S/p IV fluids for agram  Resume torsemide, continue holding losartan on discharge.  Outpt f/u with nephro

## 2025-01-09 NOTE — ASSESSMENT & PLAN NOTE
Lab Results   Component Value Date    EGFR 51 01/09/2025    EGFR 59 01/08/2025    EGFR 50 01/07/2025    CREATININE 1.30 01/09/2025    CREATININE 1.15 01/08/2025    CREATININE 1.32 (H) 01/07/2025   Creatinine improving.  Monitor creatinine.

## 2025-01-09 NOTE — ASSESSMENT & PLAN NOTE
Volume status seems acceptable to minimally elevated.  Continue diuretic and trend volume status as well as renal function

## 2025-01-09 NOTE — ASSESSMENT & PLAN NOTE
IVY most likely secondary to ischemic injury secondary to hypotension, prerenal azotemia in the presence of Jardiance and torsemide plus failure to autoregulate and in the presence of losartan--less likely component of IC GN in light of bacteremia.  After review of records it appears that the patient has a baseline Creatinine of 1.6-1.8 mg/dL.  Slight increase in the creatinine over the last 24 hours likely reflecting resumption of diuretics but overall not much change  Follows with Dr. Fischer as an outpatient.  Next appointment is 1/21  Continue torsemide  Restart Jardiance  Continue to hold losartan on discharge

## 2025-01-09 NOTE — ASSESSMENT & PLAN NOTE
If patient does indeed have true bacteremia, there would be some concern for PPM infection.  However, at present, patient does not appear to have an active infection or true bacteremia, as in above.  Surveillance blood cultures, as in above.

## 2025-01-10 ENCOUNTER — TELEPHONE (OUTPATIENT)
Dept: INFECTIOUS DISEASES | Facility: CLINIC | Age: 80
End: 2025-01-10

## 2025-01-10 ENCOUNTER — TELEPHONE (OUTPATIENT)
Dept: FAMILY MEDICINE CLINIC | Facility: CLINIC | Age: 80
End: 2025-01-10

## 2025-01-10 ENCOUNTER — TRANSITIONAL CARE MANAGEMENT (OUTPATIENT)
Dept: FAMILY MEDICINE CLINIC | Facility: CLINIC | Age: 80
End: 2025-01-10

## 2025-01-10 DIAGNOSIS — R78.81 BACTEREMIA: Primary | ICD-10-CM

## 2025-01-10 DIAGNOSIS — Z95.0 S/P PLACEMENT OF CARDIAC PACEMAKER: ICD-10-CM

## 2025-01-10 LAB
BACTERIA BLD CULT: NORMAL
BACTERIA BLD CULT: NORMAL

## 2025-01-10 NOTE — TELEPHONE ENCOUNTER
Called and left message for patient today.   Informed patient to call office back.   Was calling as patient was recently discharged from the hospital. Patient does have blood cultures ordered to be done 1/22/25.

## 2025-01-10 NOTE — TELEPHONE ENCOUNTER
First attempt to reach Thomas. Message left to call back on 1/10/25 so I can schedule a TCM visit Imani

## 2025-01-10 NOTE — TELEPHONE ENCOUNTER
[facility] Clinical  Patient is being discharged from their inpatient hospitalization today. Patients unplanned readmission score is red or yellow (meaning that they are at high risk of readmission) and require a TCM appointment within 3-5 days of discharge. Please contact this patient to schedule appointment.    Thank you    Inpatient Care Management  
We already have the JEIMY discharge notification [email or web address]  
06-Oct-2022 20:01

## 2025-01-13 ENCOUNTER — RA CDI HCC (OUTPATIENT)
Dept: OTHER | Facility: HOSPITAL | Age: 80
End: 2025-01-13

## 2025-01-13 ENCOUNTER — OFFICE VISIT (OUTPATIENT)
Dept: WOUND CARE | Facility: HOSPITAL | Age: 80
End: 2025-01-13
Payer: COMMERCIAL

## 2025-01-13 VITALS
HEART RATE: 69 BPM | RESPIRATION RATE: 18 BRPM | SYSTOLIC BLOOD PRESSURE: 116 MMHG | TEMPERATURE: 96 F | DIASTOLIC BLOOD PRESSURE: 75 MMHG

## 2025-01-13 DIAGNOSIS — E11.621 DIABETIC ULCER OF TOE OF RIGHT FOOT ASSOCIATED WITH TYPE 2 DIABETES MELLITUS, WITH NECROSIS OF BONE (HCC): Primary | ICD-10-CM

## 2025-01-13 DIAGNOSIS — L97.514 DIABETIC ULCER OF TOE OF RIGHT FOOT ASSOCIATED WITH TYPE 2 DIABETES MELLITUS, WITH NECROSIS OF BONE (HCC): Primary | ICD-10-CM

## 2025-01-13 PROCEDURE — 11042 DBRDMT SUBQ TIS 1ST 20SQCM/<: CPT | Performed by: FAMILY MEDICINE

## 2025-01-13 RX ORDER — LIDOCAINE 40 MG/G
CREAM TOPICAL ONCE
Status: COMPLETED | OUTPATIENT
Start: 2025-01-13 | End: 2025-01-13

## 2025-01-13 RX ADMIN — LIDOCAINE 1 APPLICATION: 40 CREAM TOPICAL at 13:17

## 2025-01-13 NOTE — PROGRESS NOTES
HCC coding opportunities          Chart Reviewed number of suggestions sent to Provider: 2     Patients Insurance   [id number] and E11.51  Medicare Insurance: [employer]vantage

## 2025-01-13 NOTE — PROGRESS NOTES
Wound Procedure Treatment Diabetic Ulcer Right;Posterior Toe D1, great    Performed by: Nadege Pinzon RN  Authorized by: Nitza Sotelo DO    Associated wounds:   Wound 11/08/24 Diabetic Ulcer Toe D1, great Right;Posterior  Wound cleansed with:  NSS  Applied primary dressing:  Calcium alginate and Silver  Applied secondary dressing:  Gauze  Dressing secured with:  Tape

## 2025-01-13 NOTE — PROGRESS NOTES
Name: Thomas Horne      : 1945      MRN: [id number]  Encounter Provider: Nitza Sotelo DO  Encounter Date: 2025   Encounter department: [facility] WOUND CARE  :  Assessment & Plan  Diabetic ulcer of toe of right foot associated with type 2 diabetes mellitus, with necrosis of bone (HCC)  Unfortunately the eschar was wet with trapped fluid beneath despite use of the Betadine as directed upon discharge from the hospital.  Eschar appeared stable in last clinical image from 2025 but wound appears to have increased in drainage since patient has been home, may be secondary to increased ambulation since discharge.  Wound was debrided as below.  Probing to bone postdebridement.  Small amount of purulent drainage noted beneath the wet eschar.  Change wound management to silver alginate, see wound orders below.  Proper offloading and tight diabetic control discussed.  Follow-up in 1 week or call sooner with questions or concerns  Lab Results   Component Value Date    HGBA1C 6.2 (H) 10/14/2024       Orders:    lidocaine (LMX) 4 % cream    EXTERNAL Referral to Home Health; Future    Wound Procedure Treatment Diabetic Ulcer Right;Posterior Toe D1, great    Debridement Diabetic Ulcer Right;Posterior Toe D1, great    Wound cleansing and dressings Diabetic Ulcer Right;Posterior Toe D1, great; Future        History of Present Illness   Chief Complaint   Patient presents with    Follow Up Wound Care Visit   Patient presents for follow-up of a Sumner 4 DFU of the right great toe.  No increased pain or drainage.  Patient was hospitalized from 1/3/2025 to 2025 secondary to this deteriorated right great toe ulcer in the setting of severe PAD.  Patient was transferred to [place] during his hospitalization for Agram which according to documentation was successful to improve flow.  Arterial studies from 2025 show an FABIOLA of 0.77 (last was 0.47) of the right lower extremity.  Since discharge  patient has been applying Betadine to the area daily.      Here for wound follow up.    Objective   /75   Pulse 69   Temp (!) 96 °F (35.6 °C)   Resp 18     Physical Exam  Constitutional:       General: He is not in acute distress.     Appearance: Normal appearance. He is not ill-appearing, toxic-appearing or diaphoretic.   HENT:      Head: Normocephalic and atraumatic.      Right Ear: External ear normal.      Left Ear: External ear normal.   Eyes:      Conjunctiva/sclera: Conjunctivae normal.   Pulmonary:      Effort: Pulmonary effort is normal. No respiratory distress.   Musculoskeletal:      Cervical back: Neck supple.      Right lower leg: Edema present.   Skin:     Comments: See wound assessment   Neurological:      Mental Status: He is alert.   Psychiatric:         Mood and Affect: Mood normal.         Behavior: Behavior normal.       Wound 11/08/24 Diabetic Ulcer Toe D1, great Right;Posterior (Active)   Enter Sumner score: Sumner Grade 4: Partial-foot gangrene 01/13/25 1313   Wound Image   01/13/25 1339   Wound Description Black;Brown;Eschar;Yellow;Pink 01/13/25 1313   Clara-wound Assessment Edema;Dry 01/13/25 1313   Wound Length (cm) 2.2 cm 01/13/25 1313   Wound Width (cm) 3 cm 01/13/25 1313   Wound Depth (cm) 0.1 cm 01/13/25 1313   Wound Surface Area (cm^2) 6.6 cm^2 01/13/25 1313   Wound Volume (cm^3) 0.66 cm^3 01/13/25 1313   Calculated Wound Volume (cm^3) 0.66 cm^3 01/13/25 1313   Change in Wound Size % -1000 01/13/25 1313   Drainage Amount Moderate 01/13/25 1313   Drainage Description Serosanguineous 01/13/25 1313   Non-staged Wound Description Full thickness 01/13/25 1313   Dressing Status Intact 01/13/25 1313       Wound 12/16/24 Incision Chest Left;Upper (Active)       Wound 01/07/25 Catheter entry/exit site Groin Right (Active)       Debridement   Wound 11/08/24 Diabetic Ulcer Toe D1, great Right;Posterior    Universal Protocol:  procedure performed by consultantConsent: Verbal consent  "obtained.  Consent given by: patient  Time out: Immediately prior to procedure a \"time out\" was called to verify the correct patient, procedure, equipment, support staff and site/side marked as required.  Timeout called at: 1/13/2025 1:20 PM.  Patient understanding: patient states understanding of the procedure being performed  Patient identity confirmed: verbally with patient    Debridement Details  Performed by: physician  Debridement type: surgical  Level of debridement: subcutaneous tissue  Pain control: lidocaine 4%      Post-debridement measurements  Length (cm): 2.2  Width (cm): 3  Depth (cm): 0.4  Percent debrided: 100%  Surface Area (cm^2): 6.6  Area Debrided (cm^2): 6.6  Volume (cm^3): 2.64    Tissue and other material debrided: subcutaneous tissue  Devitalized tissue debrided: exudate, necrotic debris, slough and eschar  Instrument(s) utilized: curette  Bleeding: small  Hemostasis obtained with: pressure  Response to treatment: procedure was tolerated well  Debridement Comments: Probe to bone postdebridement. Small amount of purulent drainage elicited beneath wet eschar, once removed     Results from last 6 Months   Lab Units 01/03/25  1513   WOUND CULTURE  1+ Growth of Staphylococcus aureus*  1+ Growth of         "

## 2025-01-13 NOTE — PATIENT INSTRUCTIONS
Orders Placed This Encounter   Procedures    Wound Procedure Treatment Diabetic Ulcer Right;Posterior Toe D1, great     This order was created via procedure documentation    Debridement Diabetic Ulcer Right;Posterior Toe D1, great     This order was created via procedure documentation    Wound cleansing and dressings Diabetic Ulcer Right;Posterior Toe D1, great     Right Great Toe Wound:     Wash your hands with soap and water.  Remove old dressing, discard into plastic bag and place in trash.  Cleanse the wound with normal saline prior to applying a clean dressing. Do not use tissue or cotton balls. Do not scrub the wound. Pat dry using gauze.  Shower no, sponge bath only      Apply silver alginate cut to cover to the open wound.    Cover with gauze  Secure with rolled gauze and tape  Change dressing 3 times weekly    Edema control:     Elevate extremity to heart level when sitting. Do not leave dependent.     Offloading:     Limit walking to only necessity. Wear surgical shoe to right foot with any walking    Wound infection:  If you have signs of infection please call the wound center.  If the wound center is closed- please go to the Emergency department.  Some signs of infection:  fever, chills, increased redness, red streaks, increase in pain, increased drainage.  Drainage with an odor, Change in drainage color: white/milky/green/tan/yellow,  an increase in swelling, chest pain and/or shortness of breath.     Protein: Eat protein with each meal to promote healing.  Examples of protein are fish, meat, chicken, nuts, peanut butter, eggs, lentils, edamame or a protein shake.     Standing Status:   Future     Expiration Date:   1/20/2025    EXTERNAL Referral to Home Health     Standing Status:   Future     Expiration Date:   1/13/2026     Referral Priority:   Routine     Referral Type:   Home Health     Referral Reason:   Specialty Services Required     Referred to Provider:   [facility]      Requested Specialty:   Home Health Services     Number of Visits Requested:   1     Expiration Date:   1/13/2026

## 2025-01-15 ENCOUNTER — OFFICE VISIT (OUTPATIENT)
Dept: FAMILY MEDICINE CLINIC | Facility: CLINIC | Age: 80
End: 2025-01-15
Payer: COMMERCIAL

## 2025-01-15 VITALS
DIASTOLIC BLOOD PRESSURE: 64 MMHG | BODY MASS INDEX: 29 KG/M2 | RESPIRATION RATE: 20 BRPM | WEIGHT: 226 LBS | SYSTOLIC BLOOD PRESSURE: 100 MMHG | HEART RATE: 90 BPM | HEIGHT: 74 IN | TEMPERATURE: 97 F

## 2025-01-15 DIAGNOSIS — I25.10 CORONARY ARTERY DISEASE INVOLVING NATIVE CORONARY ARTERY OF NATIVE HEART WITHOUT ANGINA PECTORIS: ICD-10-CM

## 2025-01-15 DIAGNOSIS — I10 PRIMARY HYPERTENSION: ICD-10-CM

## 2025-01-15 DIAGNOSIS — E83.9 CHRONIC KIDNEY DISEASE-MINERAL AND BONE DISORDER: ICD-10-CM

## 2025-01-15 DIAGNOSIS — I50.32 CHRONIC DIASTOLIC CONGESTIVE HEART FAILURE (HCC): ICD-10-CM

## 2025-01-15 DIAGNOSIS — M89.9 CHRONIC KIDNEY DISEASE-MINERAL AND BONE DISORDER: ICD-10-CM

## 2025-01-15 DIAGNOSIS — E78.2 MIXED HYPERLIPIDEMIA: ICD-10-CM

## 2025-01-15 DIAGNOSIS — R78.81 GRAM-POSITIVE BACTEREMIA: ICD-10-CM

## 2025-01-15 DIAGNOSIS — E08.621 DIABETIC ULCER OF RIGHT MIDFOOT ASSOCIATED WITH DIABETES MELLITUS DUE TO UNDERLYING CONDITION, LIMITED TO BREAKDOWN OF SKIN (HCC): Primary | ICD-10-CM

## 2025-01-15 DIAGNOSIS — L97.411 DIABETIC ULCER OF RIGHT MIDFOOT ASSOCIATED WITH DIABETES MELLITUS DUE TO UNDERLYING CONDITION, LIMITED TO BREAKDOWN OF SKIN (HCC): Primary | ICD-10-CM

## 2025-01-15 DIAGNOSIS — N18.9 CHRONIC KIDNEY DISEASE-MINERAL AND BONE DISORDER: ICD-10-CM

## 2025-01-15 PROCEDURE — 99215 OFFICE O/P EST HI 40 MIN: CPT | Performed by: NURSE PRACTITIONER

## 2025-01-15 NOTE — PROGRESS NOTES
Transition of Care Visit  Name: Thomas Horne      : 1945      MRN: [id number]  Encounter Provider: APOLLO Hoff  Encounter Date: 1/15/2025   Encounter department: [facility]  Chief Complaint   Patient presents with   • Transition of Care Management     Cmavp        Assessment & Plan  Diabetic ulcer of right midfoot associated with diabetes mellitus due to underlying condition, limited to breakdown of skin (HCC)  Managed by wound care and will follow with podiatrist  Lab Results   Component Value Date    HGBA1C 6.2 (H) 10/14/2024            Primary hypertension  Stable and holding losartan       Coronary artery disease involving native coronary artery of native heart without angina pectoris  Complaint with statin, plavix, beta blocker, ranexa and imdur       Chronic diastolic congestive heart failure (HCC)  On jardiance  Wt Readings from Last 3 Encounters:   01/15/25 103 kg (226 lb)   25 99.9 kg (220 lb 4 oz)   24 104 kg (230 lb)                  Mixed hyperlipidemia  Complaint with statin, fish oil and tricor       Gram-positive bacteremia  Blood cultures were possibly contaminated and will repeat cultures on  as per ID       Chronic kidney disease-mineral and bone disorder  Managed by nephrologist  Lab Results   Component Value Date    EGFR 51 2025    EGFR 59 2025    EGFR 50 2025    CREATININE 1.30 2025    CREATININE 1.15 2025    CREATININE 1.32 (H) 2025               History of Present Illness     Transitional Care Management Review:   Thomas Horne is a 79 y.o. male here for TCM follow up.     During the TCM phone call patient stated:  TCM Call     Date and time call was made  1/10/2025 11:03 AM    Hospital care reviewed  Records reviewed    Patient was hospitialized at  [facility]    Date of Admission  25    Date of discharge  25    Diagnosis  Diabetic ulcer of right midfoot associated with diabetes mellitus  due to underlying condition, limited to breakdown of skin    Disposition  Home    Were the patients medications reviewed and updated  No  He declined but knows to call if any questions    Current Symptoms  None    Shortness of breath severity  Mild      TCM Call     Post hospital issues  None    Should patient be enrolled in anticoag monitoring?  No    Scheduled for follow up?  Yes    Patients specialists  Other (comment)    Cardiologist name  Naun Bell MD (Cardiology)    Other specialists names  Podiatrist    Other specialists contcat #  Sleep Lab    Did you obtain your prescribed medications  Yes    Do you need help managing your prescriptions or medications  No    Is transportation to your appointment needed  No    Specify why  He is not cleared to drive    I have advised the patient to call PCP with any new or worsening symptoms  Orlin Forbes    Living Arrangements  Spouse or Significiant other    Support System  Family    The type of support provided  Physical; Emotional    Do you have social support  Yes, as much as I need    Are you recieving any outpatient services  No    What type of services  Wound Care    Are you recieving home care services  No    Types of home care services  Nurse visit    Are you using any community resources  No    Current waiver services  No    Have you fallen in the last 12 months  Yes    How many times  once    Interperter language line needed  No    Counseling  Patient    Counseling topics  Activities of daily living; Importance of RX compliance; instructions for management; Risk factor reduction; patient and family education    Comments  Thomas knows to call if any fevers and to go to the ER if any chest pain or dyspnea, etc Orlin Forbes        Patient is here to follow up after recent hospitalization.  Stated that doing well and had wound care visit on 1/13/24 and visiting nurses will be following with him for dressings at home.  Denies any pain at this time and stated  "that used percocet only since discharge. Denies fever and chills        Review of Systems   HENT: Negative.     Respiratory: Negative.     Cardiovascular: Negative.    Gastrointestinal: Negative.    Skin:  Positive for wound.   Neurological: Negative.      Objective   /64   Pulse 90   Temp (!) 97 °F (36.1 °C)   Resp 20   Ht 6' 2\" (1.88 m)   Wt 103 kg (226 lb)   BMI 29.02 kg/m²     Physical Exam  HENT:      Head: Normocephalic.   Eyes:      Conjunctiva/sclera: Conjunctivae normal.   Cardiovascular:      Rate and Rhythm: Normal rate and regular rhythm.      Pulses: Normal pulses.      Heart sounds: Normal heart sounds.   Pulmonary:      Effort: Pulmonary effort is normal.      Breath sounds: Normal breath sounds.   Skin:     General: Skin is warm and dry.      Comments: Dressing intact on right foot wound   Neurological:      Mental Status: He is alert and oriented to person, place, and time.   Psychiatric:         Mood and Affect: Mood normal.         Behavior: Behavior normal.         Thought Content: Thought content normal.         Judgment: Judgment normal.       Medications have been reviewed by provider in current encounter      "

## 2025-01-15 NOTE — ASSESSMENT & PLAN NOTE
Managed by wound care and will follow with podiatrist  Lab Results   Component Value Date    HGBA1C 6.2 (H) 10/14/2024

## 2025-01-15 NOTE — ASSESSMENT & PLAN NOTE
Managed by nephrologist  Lab Results   Component Value Date    EGFR 51 01/09/2025    EGFR 59 01/08/2025    EGFR 50 01/07/2025    CREATININE 1.30 01/09/2025    CREATININE 1.15 01/08/2025    CREATININE 1.32 (H) 01/07/2025

## 2025-01-15 NOTE — ASSESSMENT & PLAN NOTE
On jardiance  Wt Readings from Last 3 Encounters:   01/15/25 103 kg (226 lb)   01/03/25 99.9 kg (220 lb 4 oz)   12/23/24 104 kg (230 lb)

## 2025-01-16 ENCOUNTER — ANTICOAG VISIT (OUTPATIENT)
Dept: FAMILY MEDICINE CLINIC | Facility: CLINIC | Age: 80
End: 2025-01-16

## 2025-01-17 ENCOUNTER — RESULTS FOLLOW-UP (OUTPATIENT)
Dept: CARDIOLOGY CLINIC | Facility: CLINIC | Age: 80
End: 2025-01-17

## 2025-01-17 ENCOUNTER — REMOTE DEVICE CLINIC VISIT (OUTPATIENT)
Dept: CARDIOLOGY CLINIC | Facility: CLINIC | Age: 80
End: 2025-01-17

## 2025-01-17 ENCOUNTER — TELEPHONE (OUTPATIENT)
Age: 80
End: 2025-01-17

## 2025-01-17 DIAGNOSIS — Z95.0 PRESENCE OF PERMANENT CARDIAC PACEMAKER: Primary | ICD-10-CM

## 2025-01-17 PROCEDURE — RECHECK: Performed by: INTERNAL MEDICINE

## 2025-01-17 NOTE — PROGRESS NOTES
Results for orders placed or performed in visit on 01/17/25   Cardiac EP device report    Narrative    MDT DUAL PM/ ACTIVE SYSTEM IS MRI CONDITIONAL  CARELINK TRANSMISSION: N/BBATTERY VOLTAGE ADEQUATE. (10.8 YRS) AP 90%  35%. ALL AVAILABLE LEAD PARAMETERS WITHIN NORMAL LIMITS. NO SIGNIFICANT HIGH RATE EPISODES. NORMAL DEVICE FUNCTION.---YA

## 2025-01-17 NOTE — TELEPHONE ENCOUNTER
Nikole from [facility] called to let the doctor know the patient will be admitted to their services.   Medication list reviewed.  No further questions at this time.

## 2025-01-20 ENCOUNTER — TELEPHONE (OUTPATIENT)
Dept: WOUND CARE | Facility: HOSPITAL | Age: 80
End: 2025-01-20

## 2025-01-20 NOTE — TELEPHONE ENCOUNTER
Nurse [facility] called to make Dr. Sotelo aware patient has been accepted by [facility] into homecare

## 2025-01-21 ENCOUNTER — OFFICE VISIT (OUTPATIENT)
Dept: NEPHROLOGY | Facility: CLINIC | Age: 80
End: 2025-01-21
Payer: COMMERCIAL

## 2025-01-21 VITALS
DIASTOLIC BLOOD PRESSURE: 58 MMHG | WEIGHT: 222 LBS | HEIGHT: 74 IN | BODY MASS INDEX: 28.49 KG/M2 | HEART RATE: 44 BPM | SYSTOLIC BLOOD PRESSURE: 102 MMHG | OXYGEN SATURATION: 91 %

## 2025-01-21 DIAGNOSIS — E11.42 TYPE 2 DIABETES MELLITUS WITH DIABETIC POLYNEUROPATHY, WITH LONG-TERM CURRENT USE OF INSULIN (HCC): ICD-10-CM

## 2025-01-21 DIAGNOSIS — N18.32 STAGE 3B CHRONIC KIDNEY DISEASE (HCC): Primary | ICD-10-CM

## 2025-01-21 DIAGNOSIS — I12.9 BENIGN HYPERTENSION WITH CHRONIC KIDNEY DISEASE: ICD-10-CM

## 2025-01-21 DIAGNOSIS — E83.9 CHRONIC KIDNEY DISEASE-MINERAL AND BONE DISORDER: ICD-10-CM

## 2025-01-21 DIAGNOSIS — N18.9 ANEMIA OF CHRONIC KIDNEY FAILURE, UNSPECIFIED STAGE: ICD-10-CM

## 2025-01-21 DIAGNOSIS — M89.9 CHRONIC KIDNEY DISEASE-MINERAL AND BONE DISORDER: ICD-10-CM

## 2025-01-21 DIAGNOSIS — Z79.4 TYPE 2 DIABETES MELLITUS WITH DIABETIC POLYNEUROPATHY, WITH LONG-TERM CURRENT USE OF INSULIN (HCC): ICD-10-CM

## 2025-01-21 DIAGNOSIS — D63.1 ANEMIA OF CHRONIC KIDNEY FAILURE, UNSPECIFIED STAGE: ICD-10-CM

## 2025-01-21 DIAGNOSIS — N18.9 CHRONIC KIDNEY DISEASE-MINERAL AND BONE DISORDER: ICD-10-CM

## 2025-01-21 DIAGNOSIS — D50.9 IRON DEFICIENCY ANEMIA, UNSPECIFIED IRON DEFICIENCY ANEMIA TYPE: ICD-10-CM

## 2025-01-21 DIAGNOSIS — I50.32 CHRONIC DIASTOLIC CONGESTIVE HEART FAILURE (HCC): ICD-10-CM

## 2025-01-21 PROCEDURE — 99214 OFFICE O/P EST MOD 30 MIN: CPT | Performed by: INTERNAL MEDICINE

## 2025-01-21 RX ORDER — FERROUS SULFATE 324(65)MG
324 TABLET, DELAYED RELEASE (ENTERIC COATED) ORAL EVERY OTHER DAY
Start: 2025-01-21

## 2025-01-21 NOTE — PATIENT INSTRUCTIONS
You have chronic kidney disease (CKD) and your kidney function is stable.   Your iron level was low in the hospital - please start oral iron 1 tablet every other day.   Go for a BMP tomorrow with your blood cultures.   Continue to stay off the Losartan.   Continue with Torsemide and Jardiance.   Stop the Amlodipine.   Follow up in 4 months - please obtain blood work 1-2 weeks prior to the appointment.

## 2025-01-21 NOTE — ASSESSMENT & PLAN NOTE
Iron levels from Dec 2024 show JESUS  Start oral iron 1 tab QOD.   Recheck CBC in 4 months.   Has iron studies for hematology in April 2025    Orders:    ferrous sulfate 324 (65 Fe) mg; Take 1 tablet (324 mg total) by mouth every other day    CBC; Future

## 2025-01-22 ENCOUNTER — APPOINTMENT (OUTPATIENT)
Dept: LAB | Facility: CLINIC | Age: 80
End: 2025-01-22
Payer: COMMERCIAL

## 2025-01-22 ENCOUNTER — TELEPHONE (OUTPATIENT)
Dept: NEPHROLOGY | Facility: CLINIC | Age: 80
End: 2025-01-22

## 2025-01-22 ENCOUNTER — OFFICE VISIT (OUTPATIENT)
Dept: WOUND CARE | Facility: HOSPITAL | Age: 80
End: 2025-01-22
Payer: COMMERCIAL

## 2025-01-22 VITALS
HEART RATE: 81 BPM | RESPIRATION RATE: 16 BRPM | TEMPERATURE: 96.2 F | DIASTOLIC BLOOD PRESSURE: 75 MMHG | SYSTOLIC BLOOD PRESSURE: 110 MMHG

## 2025-01-22 DIAGNOSIS — R78.81 GRAM-POSITIVE BACTEREMIA: ICD-10-CM

## 2025-01-22 DIAGNOSIS — E11.621 DIABETIC ULCER OF TOE OF RIGHT FOOT ASSOCIATED WITH TYPE 2 DIABETES MELLITUS, WITH NECROSIS OF BONE (HCC): Primary | ICD-10-CM

## 2025-01-22 DIAGNOSIS — N18.32 STAGE 3B CHRONIC KIDNEY DISEASE (HCC): ICD-10-CM

## 2025-01-22 DIAGNOSIS — L97.514 DIABETIC ULCER OF TOE OF RIGHT FOOT ASSOCIATED WITH TYPE 2 DIABETES MELLITUS, WITH NECROSIS OF BONE (HCC): Primary | ICD-10-CM

## 2025-01-22 LAB
ANION GAP SERPL CALCULATED.3IONS-SCNC: 10 MMOL/L (ref 4–13)
BUN SERPL-MCNC: 42 MG/DL (ref 5–25)
CALCIUM SERPL-MCNC: 9.5 MG/DL (ref 8.4–10.2)
CHLORIDE SERPL-SCNC: 102 MMOL/L (ref 96–108)
CO2 SERPL-SCNC: 31 MMOL/L (ref 21–32)
CREAT SERPL-MCNC: 1.8 MG/DL (ref 0.6–1.3)
GFR SERPL CREATININE-BSD FRML MDRD: 34 ML/MIN/1.73SQ M
GLUCOSE P FAST SERPL-MCNC: 173 MG/DL (ref 65–99)
POTASSIUM SERPL-SCNC: 4.2 MMOL/L (ref 3.5–5.3)
SODIUM SERPL-SCNC: 143 MMOL/L (ref 135–147)

## 2025-01-22 PROCEDURE — 11044 DBRDMT BONE 1ST 20 SQ CM/<: CPT | Performed by: FAMILY MEDICINE

## 2025-01-22 PROCEDURE — 87040 BLOOD CULTURE FOR BACTERIA: CPT

## 2025-01-22 PROCEDURE — 80048 BASIC METABOLIC PNL TOTAL CA: CPT

## 2025-01-22 PROCEDURE — 36415 COLL VENOUS BLD VENIPUNCTURE: CPT

## 2025-01-22 PROCEDURE — 88311 DECALCIFY TISSUE: CPT | Performed by: PATHOLOGY

## 2025-01-22 PROCEDURE — 88307 TISSUE EXAM BY PATHOLOGIST: CPT | Performed by: PATHOLOGY

## 2025-01-22 RX ORDER — LIDOCAINE 40 MG/G
CREAM TOPICAL ONCE
Status: COMPLETED | OUTPATIENT
Start: 2025-01-22 | End: 2025-01-22

## 2025-01-22 RX ADMIN — LIDOCAINE: 40 CREAM TOPICAL at 11:36

## 2025-01-22 NOTE — PROGRESS NOTES
Name: Thomas Horne      : 1945      MRN: [id number]  Encounter Provider: Nitza Sotelo DO  Encounter Date: 2025   Encounter department: [facility] WOUND CARE  :  Assessment & Plan  Diabetic ulcer of toe of right foot associated with type 2 diabetes mellitus, with necrosis of bone (HCC)  Measurements are improved but wound continues to probe to bone, with bone crumbling at the base  Wound debrided as below  Bone sample obtained to be cultured to direct antibiotic therapy  Change wound management to Acticoat, see wound orders below  Discussed with the patient that if this wound continues to decline, he will likely require surgical intervention and therefore it is crucial that he strictly adhere to proper offloading and tight diabetic control  Follow-up in 5 days or call sooner with questions or concerns    Lab Results   Component Value Date    HGBA1C 6.2 (H) 10/14/2024       Orders:    lidocaine (LMX) 4 % cream    Wound cleansing and dressings Diabetic Ulcer Right;Posterior Toe D1, great; Future    Wound Procedure Treatment Diabetic Ulcer Right;Posterior Toe D1, great    Debridement Diabetic Ulcer Right;Posterior Toe D1, great    Tissue Exam        History of Present Illness   Chief Complaint   Patient presents with    Follow Up Wound Care Visit     Right foot wound   Patient presents for follow-up of a Sumner 4 DFU of the right great toe.  No increased pain or drainage.  Has been using silver alginate on the wound and wearing a surgical shoe, walking on it minimally.      Here for wound follow up.    Objective   /75   Pulse 81   Temp (!) 96.2 °F (35.7 °C)   Resp 16     Physical Exam  Wound 24 Diabetic Ulcer Toe D1, great Right;Posterior (Active)   Enter Sumner score: Sumner Grade 4: Partial-foot gangrene 25 1132   Wound Image   25 1223   Wound Description Brown;Eschar;Yellow;Pink;Probes to bone 25 1132   Clara-wound Assessment Edema;Dry;Callus  "01/22/25 1132   Wound Length (cm) 1.5 cm 01/22/25 1132   Wound Width (cm) 2.5 cm 01/22/25 1132   Wound Depth (cm) 0.1 cm 01/22/25 1132   Wound Surface Area (cm^2) 3.75 cm^2 01/22/25 1132   Wound Volume (cm^3) 0.375 cm^3 01/22/25 1132   Calculated Wound Volume (cm^3) 0.38 cm^3 01/22/25 1132   Change in Wound Size % -533.33 01/22/25 1132   Drainage Amount Moderate 01/22/25 1132   Drainage Description Serosanguineous 01/22/25 1132   Non-staged Wound Description Full thickness 01/22/25 1132   Dressing Status Intact 01/22/25 1132       Wound 12/16/24 Incision Chest Left;Upper (Active)       Wound 01/07/25 Catheter entry/exit site Groin Right (Active)       Debridement   Wound 11/08/24 Diabetic Ulcer Toe D1, great Right;Posterior    Universal Protocol:  procedure performed by consultantConsent: Verbal consent obtained.  Consent given by: patient  Time out: Immediately prior to procedure a \"time out\" was called to verify the correct patient, procedure, equipment, support staff and site/side marked as required.  Timeout called at: 1/22/2025 11:50 AM.  Patient understanding: patient states understanding of the procedure being performed  Patient identity confirmed: verbally with patient    Debridement Details  Performed by: physician  Debridement type: surgical  Level of debridement: bone  Pain control: lidocaine 4%      Post-debridement measurements  Length (cm): 1.5  Width (cm): 2.5  Depth (cm): 0.5  Percent debrided: 100%  Surface Area (cm^2): 3.75  Area Debrided (cm^2): 3.75  Volume (cm^3): 1.88    Tissue and other material debrided: bone and subcutaneous tissue  Devitalized tissue debrided: exudate and slough  Instrument(s) utilized: curette  Bleeding: small  Hemostasis obtained with: pressure  Response to treatment: procedure was tolerated well       Results from last 6 Months   Lab Units 01/03/25  1513   WOUND CULTURE  1+ Growth of Staphylococcus aureus*  1+ Growth of         "

## 2025-01-22 NOTE — TELEPHONE ENCOUNTER
I called the patient and we spoke over the phone.   Recent laboratory data were reviewed.     Lab Results   Component Value Date    SODIUM 143 01/22/2025    K 4.2 01/22/2025     01/22/2025    CO2 31 01/22/2025    BUN 42 (H) 01/22/2025    CREATININE 1.80 (H) 01/22/2025    GLUC 142 (H) 01/09/2025    CALCIUM 9.5 01/22/2025     Stable renal function.   SCr at baseline.     Plan:  No changes.

## 2025-01-22 NOTE — ASSESSMENT & PLAN NOTE
Measurements are improved but wound continues to probe to bone, with bone crumbling at the base  Wound debrided as below  Bone sample obtained to be cultured to direct antibiotic therapy  Change wound management to Acticoat, see wound orders below  Discussed with the patient that if this wound continues to decline, he will likely require surgical intervention and therefore it is crucial that he strictly adhere to proper offloading and tight diabetic control  Follow-up in 5 days or call sooner with questions or concerns    Lab Results   Component Value Date    HGBA1C 6.2 (H) 10/14/2024       Orders:    lidocaine (LMX) 4 % cream    Wound cleansing and dressings Diabetic Ulcer Right;Posterior Toe D1, great; Future    Wound Procedure Treatment Diabetic Ulcer Right;Posterior Toe D1, great    Debridement Diabetic Ulcer Right;Posterior Toe D1, great    Tissue Exam

## 2025-01-22 NOTE — PATIENT INSTRUCTIONS
Orders Placed This Encounter   Procedures    Wound cleansing and dressings Diabetic Ulcer Right;Posterior Toe D1, great     Right Great Toe Wound:      Wash your hands with soap and water.  Remove old dressing, discard into plastic bag and place in trash.  Cleanse the wound with normal saline prior to applying a clean dressing. Do not use tissue or cotton balls. Do not scrub the wound. Pat dry using gauze.  Shower no, sponge bath only    Discontinue the silver alginate.     Apply acticoat 3 followed by a piece of alginate cut to cover to the open wound.  (The acticoat can discolor the wound area to a gray or black)  Cover with gauze  Secure with rolled gauze and tape  Change dressing 3 times weekly     Edema control:      Elevate extremity to heart level when sitting. Do not leave dependent.      Offloading:      Limit walking to only necessity. Wear surgical shoe to right foot with any walking     Wound infection:  If you have signs of infection please call the wound center.  If the wound center is closed- please go to the Emergency department.  Some signs of infection:  fever, chills, increased redness, red streaks, increase in pain, increased drainage.  Drainage with an odor, Change in drainage color: white/milky/green/tan/yellow,  an increase in swelling, chest pain and/or shortness of breath.      Protein: Eat protein with each meal to promote healing.  Examples of protein are fish, meat, chicken, nuts, peanut butter, eggs, lentils, edamame or a protein shake.     Standing Status:   Future     Expiration Date:   1/29/2025    Wound Procedure Treatment Diabetic Ulcer Right;Posterior Toe D1, great     This order was created via procedure documentation

## 2025-01-22 NOTE — PROGRESS NOTES
Wound Procedure Treatment Diabetic Ulcer Right;Posterior Toe D1, great    Performed by: Nadege Pinzon RN  Authorized by: Nitza Sotelo DO    Associated wounds:   Wound 11/08/24 Diabetic Ulcer Toe D1, great Right;Posterior  Wound cleansed with:  NSS  Applied primary dressing:  Acticoat and Calcium alginate  Applied secondary dressing:  Gauze  Dressing secured with:  Ender and Tape  Comments:  Acticoat 3

## 2025-01-22 NOTE — LETTER
Critical access hospital WOUND CARE  [address] [facility] [address]  Phone#  [phone]  Fax#  [phone]    Patient:  Thomas Horne  YOB: 1945  Phone:  [phone]  Date of Visit:  1/22/2025    Orders Placed This Encounter   Procedures   • Wound cleansing and dressings Diabetic Ulcer Right;Posterior Toe D1, great     Right Great Toe Wound:      Wash your hands with soap and water.  Remove old dressing, discard into plastic bag and place in trash.  Cleanse the wound with normal saline prior to applying a clean dressing. Do not use tissue or cotton balls. Do not scrub the wound. Pat dry using gauze.  Shower no, sponge bath only    Discontinue the silver alginate.     Apply acticoat 3 followed by a piece of alginate cut to cover to the open wound.  (The acticoat can discolor the wound area to a gray or black)  Cover with gauze  Secure with rolled gauze and tape  Change dressing 3 times weekly     Edema control:      Elevate extremity to heart level when sitting. Do not leave dependent.      Offloading:      Limit walking to only necessity. Wear surgical shoe to right foot with any walking     Wound infection:  If you have signs of infection please call the wound center.  If the wound center is closed- please go to the Emergency department.  Some signs of infection:  fever, chills, increased redness, red streaks, increase in pain, increased drainage.  Drainage with an odor, Change in drainage color: white/milky/green/tan/yellow,  an increase in swelling, chest pain and/or shortness of breath.      Protein: Eat protein with each meal to promote healing.  Examples of protein are fish, meat, chicken, nuts, peanut butter, eggs, lentils, edamame or a protein shake.     Standing Status:   Future     Expiration Date:   1/29/2025   • Wound Procedure Treatment Diabetic Ulcer Right;Posterior Toe D1, great     This order was created via procedure documentation         Electronically signed by  Nitza Sotelo, DO

## 2025-01-24 ENCOUNTER — TELEPHONE (OUTPATIENT)
Age: 80
End: 2025-01-24

## 2025-01-24 NOTE — TELEPHONE ENCOUNTER
Reviewed meds with Gayla from [facility] all are up to date with the exception to Vitamin B12. Pt is currenlty taking that at home but is not currently on his med list.  Please advise.

## 2025-01-27 ENCOUNTER — OFFICE VISIT (OUTPATIENT)
Dept: WOUND CARE | Facility: HOSPITAL | Age: 80
End: 2025-01-27
Payer: COMMERCIAL

## 2025-01-27 VITALS
RESPIRATION RATE: 18 BRPM | SYSTOLIC BLOOD PRESSURE: 165 MMHG | HEART RATE: 65 BPM | TEMPERATURE: 96.4 F | DIASTOLIC BLOOD PRESSURE: 70 MMHG

## 2025-01-27 DIAGNOSIS — E11.621 DIABETIC ULCER OF TOE OF RIGHT FOOT ASSOCIATED WITH TYPE 2 DIABETES MELLITUS, WITH NECROSIS OF BONE (HCC): Primary | ICD-10-CM

## 2025-01-27 DIAGNOSIS — L97.514 DIABETIC ULCER OF TOE OF RIGHT FOOT ASSOCIATED WITH TYPE 2 DIABETES MELLITUS, WITH NECROSIS OF BONE (HCC): Primary | ICD-10-CM

## 2025-01-27 LAB — BACTERIA BLD CULT: NORMAL

## 2025-01-27 PROCEDURE — 88307 TISSUE EXAM BY PATHOLOGIST: CPT | Performed by: PATHOLOGY

## 2025-01-27 PROCEDURE — 11042 DBRDMT SUBQ TIS 1ST 20SQCM/<: CPT | Performed by: FAMILY MEDICINE

## 2025-01-27 PROCEDURE — 99214 OFFICE O/P EST MOD 30 MIN: CPT | Performed by: FAMILY MEDICINE

## 2025-01-27 PROCEDURE — 88311 DECALCIFY TISSUE: CPT | Performed by: PATHOLOGY

## 2025-01-27 RX ORDER — LIDOCAINE 40 MG/G
CREAM TOPICAL ONCE
Status: COMPLETED | OUTPATIENT
Start: 2025-01-27 | End: 2025-01-27

## 2025-01-27 RX ORDER — CEPHALEXIN 500 MG/1
500 CAPSULE ORAL EVERY 6 HOURS SCHEDULED
Qty: 40 CAPSULE | Refills: 0 | Status: SHIPPED | OUTPATIENT
Start: 2025-01-27 | End: 2025-02-06

## 2025-01-27 RX ADMIN — LIDOCAINE 1 APPLICATION: 40 CREAM TOPICAL at 09:23

## 2025-01-27 NOTE — PATIENT INSTRUCTIONS
Orders Placed This Encounter   Procedures    Wound cleansing and dressings Diabetic Ulcer Right;Posterior Toe D1, great     Right Great Toe Wound:      Wash your hands with soap and water.  Remove old dressing, discard into plastic bag and place in trash.  Cleanse the wound with normal saline prior to applying a clean dressing. Do not use tissue or cotton balls. Do not scrub the wound. Pat dry using gauze.  Shower no, sponge bath only    Discontinue the silver alginate.     Apply acticoat 3 followed by a piece of alginate cut to cover to the open wound.  (The acticoat can discolor the wound area to a gray or black)  Cover with gauze  Secure with rolled gauze and tape  Change dressing 3 times weekly     Edema control:      Elevate extremity to heart level when sitting. Do not leave dependent.      Offloading:      Limit walking to only necessity. Wear surgical shoe to right foot with any walking     Wound infection:  If you have signs of infection please call the wound center.  If the wound center is closed- please go to the Emergency department.  Some signs of infection:  fever, chills, increased redness, red streaks, increase in pain, increased drainage.  Drainage with an odor, Change in drainage color: white/milky/green/tan/yellow,  an increase in swelling, chest pain and/or shortness of breath.      Protein: Eat protein with each meal to promote healing.  Examples of protein are fish, meat, chicken, nuts, peanut butter, eggs, lentils, edamame or a protein shake.     Standing Status:   Future     Expiration Date:   2/3/2025

## 2025-01-27 NOTE — PROGRESS NOTES
Wound Procedure Treatment Diabetic Ulcer Right;Posterior Toe D1, great    Performed by: Nadege Pinzon RN  Authorized by: Nitza Sotelo DO    Associated wounds:   Wound 11/08/24 Diabetic Ulcer Toe D1, great Right;Posterior  Wound cleansed with:  NSS  Applied primary dressing:  Acticoat and Calcium alginate  Applied secondary dressing:  Gauze  Dressing secured with:  Ender and Tape  Comments:  Acticoat 3 to wound bed

## 2025-01-31 ENCOUNTER — TELEPHONE (OUTPATIENT)
Age: 80
End: 2025-01-31

## 2025-01-31 NOTE — TELEPHONE ENCOUNTER
Da from [facility] called to make Dr Lombardi aware they will be discharging pt from home care services for wound care.  He will be undergoing hyperbaric oxygen treatment on Monday.  If there are any questions, she can be reached at [phone].

## 2025-02-03 ENCOUNTER — OFFICE VISIT (OUTPATIENT)
Dept: WOUND CARE | Facility: HOSPITAL | Age: 80
End: 2025-02-03
Payer: COMMERCIAL

## 2025-02-03 ENCOUNTER — APPOINTMENT (OUTPATIENT)
Dept: WOUND CARE | Facility: HOSPITAL | Age: 80
End: 2025-02-03
Payer: COMMERCIAL

## 2025-02-03 VITALS
TEMPERATURE: 96.6 F | HEART RATE: 69 BPM | RESPIRATION RATE: 18 BRPM | DIASTOLIC BLOOD PRESSURE: 70 MMHG | SYSTOLIC BLOOD PRESSURE: 110 MMHG

## 2025-02-03 DIAGNOSIS — E11.621 DIABETIC ULCER OF TOE OF RIGHT FOOT ASSOCIATED WITH TYPE 2 DIABETES MELLITUS, WITH NECROSIS OF BONE (HCC): Primary | ICD-10-CM

## 2025-02-03 DIAGNOSIS — L97.514 DIABETIC ULCER OF TOE OF RIGHT FOOT ASSOCIATED WITH TYPE 2 DIABETES MELLITUS, WITH NECROSIS OF BONE (HCC): ICD-10-CM

## 2025-02-03 DIAGNOSIS — E11.621 DIABETIC ULCER OF TOE OF RIGHT FOOT ASSOCIATED WITH TYPE 2 DIABETES MELLITUS, WITH NECROSIS OF BONE (HCC): ICD-10-CM

## 2025-02-03 DIAGNOSIS — L97.514 DIABETIC ULCER OF TOE OF RIGHT FOOT ASSOCIATED WITH TYPE 2 DIABETES MELLITUS, WITH NECROSIS OF BONE (HCC): Primary | ICD-10-CM

## 2025-02-03 LAB
GLUCOSE SERPL-MCNC: 132 MG/DL (ref 65–140)
GLUCOSE SERPL-MCNC: 178 MG/DL (ref 65–140)
GLUCOSE SERPL-MCNC: 255 MG/DL (ref 65–140)

## 2025-02-03 PROCEDURE — 82948 REAGENT STRIP/BLOOD GLUCOSE: CPT

## 2025-02-03 PROCEDURE — G0277 HBOT, FULL BODY CHAMBER, 30M: HCPCS

## 2025-02-03 PROCEDURE — 99183 HYPERBARIC OXYGEN THERAPY: CPT | Performed by: PHYSICIAN ASSISTANT

## 2025-02-03 NOTE — PROGRESS NOTES
HBO Treatment Course Details       Treatment Notes: Treatment tolerated well.  No discomfort noted at the end of the dive.  Right ear tube my have become dislodged.  Will monitor     Treatment Course Number: 1  Total Treatments Ordered:  40     Diagnosis: No diagnosis found.    HBO Treatment Details:  In-Patient Visit: no  Treatment Length:90 Minutes(Minutes)  Chamber #: Hard sided Monoplace Chamber    Pre-Treatment details:  Pre-treatment protocol Treatment Protocol: 2.5 INDY X 90 minutes w/ 100% oxygen, two 5 minute air breaks  Left ear clear?: yes  Right ear clear?: yes  Left ear intact?: yes  Right ear intact?: yes        PE Tubes present, Left ear?: yes  PE Tubes present, Right ear?: yes  Left ear irrigated?: no  Right ear irrigated?: no  Left ear TEED scale: Grade 0  Right ear TEED scale: Grade 0   Pretreatment heart and lung assessment: Pretreatment heart and lung auscltation unremarkable. Patient cleared for HBOT     Treatment details:  INDY Rate: 2  Started Compression: 0832  Reached Compression: 0838  Total Compression Time: 6 (Minutes)  Total Holding Time: 100 (Minutes)  Started Decompression: 1018  Reached Surface: 1024  Total Decompression Time: 6 (Minutes)  Total Airbreaks:   (Minutes)  Total Time of Treatment: 112 (Minutes)  Symptoms Noted During Treatment: None (Minutes)    Post treatment details:  Left ear clear?: yes  Right ear clear?: yes  Left ears intact?: yes  Right ears intact?: yes        PE Tubes present, Left ear?: yes  PE Tubes present, Right ear?: yes (TM tube may dislodged/completely fallen out of TM based on appearance)        Left ear TEED scale: Grade 0  Right ear TEED scale: Grade 0  Post treatment heart and lung assessment: Post treatment heart and lung auscltation unremarkable. Patient cleared for discharge. Tolerated treatment well.             Vital Signs:  HBO Glucose Reference Range: 100-350 mg/dl   Pre-Treatment Post-Treatment   Time vitals are taken: 0810 Time vitals are taken:  1030   Blood Pressure: 110/70 Blood Pressure: 123/75   Pulse: 69 Pulse: 67   Resp: 18 Resp: 18   Temp: 96.6 °F (35.9 °C) Temp: 97 °F (36.1 °C)   Pre-Inspection Glucose readin Post-Inspection Glucose readin       Allergies   Allergen Reactions    Lisinopril Rash and Lip Swelling     Patient Active Problem List    Diagnosis Date Noted    Gram-positive bacteremia 2025    Acute metabolic encephalopathy 2025    Bacteremia 2025    S/P placement of cardiac pacemaker 2024    Bradycardia 2024    Multiple open wounds of lower extremity 2024    Ulcer of right foot (HCC) 2024    SOB (shortness of breath) 2024    Nausea 2024    Elevated troponin 2024    History of DVT (deep vein thrombosis) 2024    Diabetic ulcer of toe of right foot associated with type 2 diabetes mellitus, with fat layer exposed (HCC) 2024    Plantar fasciitis, right 07/10/2024    Acute deep vein thrombosis (DVT) of left peroneal vein (HCC) 2024    Iron deficiency anemia 2024    Shortness of breath 2024    History of colon polyps 2024    Duodenal ulcer 2024    Multiple gastric ulcers 2023    Iron deficiency anemia due to chronic blood loss 2023    Melena 2023    Symptomatic anemia 2023    Frequent PVCs 2023    Chronic diastolic congestive heart failure (HCC) 2023    Acute kidney injury superimposed on stage 3b chronic kidney disease (HCC) 2023    Diabetic ulcer of right midfoot associated with diabetes mellitus due to underlying condition, limited to breakdown of skin ([facility]) 06/15/2023    Hypertensive urgency 2023    Elevated troponin level not due myocardial infarction 2023    Stable angina pectoris ([facility]) 2023    Chronic kidney disease-mineral and bone disorder 2022    Nonrheumatic aortic valve stenosis 2022    Gross hematuria 2022    Platelets decreased ([facility]) 2022     Acute kidney injury superimposed on chronic kidney disease  ([facility]) 02/16/2022    Actinic keratoses 01/14/2022    Type 2 diabetes mellitus with diabetic peripheral angiopathy without gangrene, with long-term current use of insulin ([facility]) 01/11/2022    Varicose veins of left lower extremity 06/25/2021    History of endovascular stent graft for abdominal aortic aneurysm (AAA) 06/25/2021    Bruit (arterial) 06/25/2021    PAD (peripheral artery disease) ([facility]) 06/25/2021    Type 2 diabetes mellitus with diabetic polyneuropathy, with long-term current use of insulin ([facility]) 06/10/2021    Mixed hyperlipidemia 05/11/2021    Primary hypertension 05/11/2021    Coronary artery disease involving native coronary artery of native heart without angina pectoris 05/11/2021    S/P angioplasty with stent 05/11/2021    Hx of CABG 05/11/2021    S/P AAA repair 05/11/2021    S/P prostatectomy 05/11/2021    H/O prostate cancer 05/11/2021     No orders of the defined types were placed in this encounter.

## 2025-02-04 ENCOUNTER — OFFICE VISIT (OUTPATIENT)
Dept: WOUND CARE | Facility: HOSPITAL | Age: 80
End: 2025-02-04
Payer: COMMERCIAL

## 2025-02-04 ENCOUNTER — TELEPHONE (OUTPATIENT)
Age: 80
End: 2025-02-04

## 2025-02-04 VITALS
RESPIRATION RATE: 18 BRPM | DIASTOLIC BLOOD PRESSURE: 77 MMHG | HEART RATE: 69 BPM | SYSTOLIC BLOOD PRESSURE: 160 MMHG | TEMPERATURE: 96.4 F

## 2025-02-04 DIAGNOSIS — L97.514 DIABETIC ULCER OF TOE OF RIGHT FOOT ASSOCIATED WITH TYPE 2 DIABETES MELLITUS, WITH NECROSIS OF BONE (HCC): ICD-10-CM

## 2025-02-04 DIAGNOSIS — E11.621 DIABETIC ULCER OF TOE OF RIGHT FOOT ASSOCIATED WITH TYPE 2 DIABETES MELLITUS, WITH NECROSIS OF BONE (HCC): ICD-10-CM

## 2025-02-04 LAB — GLUCOSE SERPL-MCNC: 175 MG/DL (ref 65–140)

## 2025-02-04 PROCEDURE — 82948 REAGENT STRIP/BLOOD GLUCOSE: CPT | Performed by: FAMILY MEDICINE

## 2025-02-04 PROCEDURE — G0277 HBOT, FULL BODY CHAMBER, 30M: HCPCS | Performed by: FAMILY MEDICINE

## 2025-02-04 PROCEDURE — 99183 HYPERBARIC OXYGEN THERAPY: CPT | Performed by: PHYSICIAN ASSISTANT

## 2025-02-04 NOTE — TELEPHONE ENCOUNTER
Lmom for patient at this time we we do not have any thing later for patient. If patient has any other question or concerns he can give us a call

## 2025-02-04 NOTE — TELEPHONE ENCOUNTER
Caller: Thomas     Doctor and/or Office: Dr. Arango     #: [phone]     Escalation: Appointment Patient is now doing HBO for wound care in the  hypobaric chamber  he wanted to know if it is possible to come in in a little later for his appt  he already had to rs because of this.  Please advise thank you

## 2025-02-04 NOTE — PROGRESS NOTES
HBO Treatment Course Details       Treatment Notes: Patient complained of ear pain, diaphoresis, and nausea. Requested to be brought out of the chamber.  Made aware. Patient brought to surface. Stated he felt better once out of chamber. VSS. Follow up made with ENT for tube placement. Patient to return on Friday.      Treatment Course Number: 2  Total Treatments Ordered:  40     Diagnosis:   1. Diabetic ulcer of toe of right foot associated with type 2 diabetes mellitus, with necrosis of bone (HCC)  Hyperbaric oxygen thearpy          HBO Treatment Details:  In-Patient Visit: no  Treatment Length:90 Minutes(Minutes)  Chamber #: Hard sided Monoplace Chamber    Pre-Treatment details:  Pre-treatment protocol Treatment Protocol: 2.0 INDY X 90 minutes w/ 100% oxygen, two 5 minute air breaks                                             Treatment details:  INDY Rate: 2  Started Compression: 0815  Reached Compression: 0821  Total Compression Time: 6 (Minutes)  Total Holding Time: 28 (Minutes)  Started Decompression: 0849  Reached Surface: 0855  Total Decompression Time: 6 (Minutes)  Total Airbreaks:   (Minutes)  Total Time of Treatment: 40 (Minutes)  Symptoms Noted During Treatment: Nausea, Restlessness and irritability, Other (Comment) (ear pain) (Minutes)    Post treatment details:                                                    Vital Signs:  HBO Glucose Reference Range: 100-350 mg/dl   Pre-Treatment Post-Treatment   Time vitals are taken: 0803 Time vitals are taken: 0855   Blood Pressure: 160/77 Blood Pressure: 142/75   Pulse: 69 Pulse: 70   Resp: 18 Resp: 18   Temp: 96.4 °F (35.8 °C) Temp: 97.1 °F (36.2 °C)   Pre-Inspection Glucose readin Post-Inspection Glucose readin       Allergies   Allergen Reactions   • Lisinopril Rash and Lip Swelling     Patient Active Problem List    Diagnosis Date Noted   • Gram-positive bacteremia 2025   • Acute metabolic encephalopathy 2025   • Bacteremia 2025    • S/P placement of cardiac pacemaker 12/20/2024   • Bradycardia 12/12/2024   • Multiple open wounds of lower extremity 12/12/2024   • Ulcer of right foot ([facility]) 09/07/2024   • SOB (shortness of breath) 09/06/2024   • Nausea 09/06/2024   • Elevated troponin 09/06/2024   • History of DVT (deep vein thrombosis) 08/26/2024   • Diabetic ulcer of toe of right foot associated with type 2 diabetes mellitus, with fat layer exposed ([facility]) 08/08/2024   • Plantar fasciitis, right 07/10/2024   • Acute deep vein thrombosis (DVT) of left peroneal vein ([facility]) 05/01/2024   • Iron deficiency anemia 04/23/2024   • Shortness of breath 04/09/2024   • History of colon polyps 02/22/2024   • Duodenal ulcer 02/01/2024   • Multiple gastric ulcers 12/27/2023   • Iron deficiency anemia due to chronic blood loss 12/27/2023   • Melena 12/26/2023   • Symptomatic anemia 12/26/2023   • Frequent PVCs 06/17/2023   • Chronic diastolic congestive heart failure ([facility]) 06/16/2023   • Acute kidney injury superimposed on stage 3b chronic kidney disease ([facility]) 06/16/2023   • Diabetic ulcer of right midfoot associated with diabetes mellitus due to underlying condition, limited to breakdown of skin ([facility]) 06/15/2023   • Hypertensive urgency 05/11/2023   • Elevated troponin level not due myocardial infarction 05/11/2023   • Stable angina pectoris ([facility]) 03/07/2023   • Chronic kidney disease-mineral and bone disorder 11/30/2022   • Nonrheumatic aortic valve stenosis 11/11/2022   • Gross hematuria 07/26/2022   • Platelets decreased ([facility]) 07/22/2022   • Acute kidney injury superimposed on chronic kidney disease  ([facility]) 02/16/2022   • Actinic keratoses 01/14/2022   • Type 2 diabetes mellitus with diabetic peripheral angiopathy without gangrene, with long-term current use of insulin ([facility]) 01/11/2022   • Varicose veins of left lower extremity 06/25/2021   • History of endovascular stent graft for abdominal aortic aneurysm (AAA) 06/25/2021   • Bruit (arterial) 06/25/2021    • PAD (peripheral artery disease) ([facility]) 06/25/2021   • Type 2 diabetes mellitus with diabetic polyneuropathy, with long-term current use of insulin ([facility]) 06/10/2021   • Mixed hyperlipidemia 05/11/2021   • Primary hypertension 05/11/2021   • Coronary artery disease involving native coronary artery of native heart without angina pectoris 05/11/2021   • S/P angioplasty with stent 05/11/2021   • Hx of CABG 05/11/2021   • S/P AAA repair 05/11/2021   • S/P prostatectomy 05/11/2021   • H/O prostate cancer 05/11/2021     No orders of the defined types were placed in this encounter.

## 2025-02-05 ENCOUNTER — OFFICE VISIT (OUTPATIENT)
Dept: AUDIOLOGY | Facility: CLINIC | Age: 80
End: 2025-02-05
Payer: COMMERCIAL

## 2025-02-05 ENCOUNTER — TELEPHONE (OUTPATIENT)
Dept: OTOLARYNGOLOGY | Facility: CLINIC | Age: 80
End: 2025-02-05

## 2025-02-05 DIAGNOSIS — H90.3 SENSORINEURAL HEARING LOSS, BILATERAL: Primary | ICD-10-CM

## 2025-02-05 LAB — GLUCOSE SERPL-MCNC: 151 MG/DL (ref 65–140)

## 2025-02-05 PROCEDURE — 92567 TYMPANOMETRY: CPT | Performed by: AUDIOLOGIST

## 2025-02-05 PROCEDURE — 92557 COMPREHENSIVE HEARING TEST: CPT | Performed by: AUDIOLOGIST

## 2025-02-05 NOTE — PROGRESS NOTES
ENT HEARING EVALUATION    Name:  Thomas Horne  :  1945  Age:  80 y.o.   MRN:  [id number]  Date of Evaluation: 25     HISTORY:    Reason for visit: ENT    Thomas Horne is being seen today for an evaluation of hearing as part of his ENT visit. The patient reports pain in his right ear primarily during hyperbolic treatments. He has a history of PE tubes bilaterally.     EVALUATION:    Otoscopic Evaluation:   Right Ear: Occluding cerumen with PE tube appearing to be dislodged   Left Ear: PE tube present in TM    Tympanometry:   Right Ear: Type A; normal middle ear pressure and static compliance    Left Ear: Type B (large volume); no measurable middle ear pressure or static compliance, consistent with a patent PE tube/grommet or tympanic membrane perforation    Speech Audiometry:  Speech Reception (SRT)   Right Ear: 20 dB HL   Left Ear: 25 dB HL    Word Recognition Scores (WRS):  Right Ear: excellent (100 % correct)     Left Ear: excellent (90 % correct)   Stimuli: [id number]    Pure Tone Audiometry:  Conventional pure tone audiometry from 250 - 8000 Hz  was obtained with good reliability and revealed the following:     Right Ear: 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 Now symmetrical with Left ear.     RECOMMENDATIONS:    Return to ENT as scheduled to review results    Juan Gamez, CCC-A  Clinical Audiologist

## 2025-02-05 NOTE — TELEPHONE ENCOUNTER
LM for patient to call back and get switched directly to the office for a complex scheduling issue for today and tomorrow.  Please transfer him to the office if he calls back.  Thank you so much!

## 2025-02-06 ENCOUNTER — TELEPHONE (OUTPATIENT)
Dept: OTOLARYNGOLOGY | Facility: CLINIC | Age: 80
End: 2025-02-06

## 2025-02-06 ENCOUNTER — OFFICE VISIT (OUTPATIENT)
Dept: OTOLARYNGOLOGY | Facility: CLINIC | Age: 80
End: 2025-02-06
Payer: COMMERCIAL

## 2025-02-06 DIAGNOSIS — Z92.89 HISTORY OF HYPERBARIC OXYGEN THERAPY: ICD-10-CM

## 2025-02-06 DIAGNOSIS — H69.91 DYSFUNCTION OF RIGHT EUSTACHIAN TUBE: ICD-10-CM

## 2025-02-06 DIAGNOSIS — Z96.22 HISTORY OF TYMPANOSTOMY TUBE PLACEMENT: Primary | ICD-10-CM

## 2025-02-06 PROCEDURE — 99213 OFFICE O/P EST LOW 20 MIN: CPT | Performed by: OTOLARYNGOLOGY

## 2025-02-06 PROCEDURE — 69433 CREATE EARDRUM OPENING: CPT | Performed by: OTOLARYNGOLOGY

## 2025-02-06 NOTE — TELEPHONE ENCOUNTER
Left VM to move today's appointment to 1pm due to delayed opening.  Please transfer call to me (Deysi) when patient calls back

## 2025-02-06 NOTE — PROGRESS NOTES
Assessment/Plan:  Right PE tube was extruded and TM healed.  New PE tube placed on right.  Left PE tube still in place and patent.  Pt cleared to resume HBO.  F/u in 1 month.      Diagnosis ICD-10-CM Associated Orders   1. History of tympanostomy tube placement  Z96.22       2. History of hyperbaric oxygen therapy  Z92.89       3. Dysfunction of right eustachian tube  H69.91              Subjective:      Patient ID: Thomas Horne is a 80 y.o. male.    Pt has begun having HBO again, but had pain in his right ear.  His audiogram suggests that his right PE tube is out.        The following portions of the patient's history were reviewed and updated as appropriate: allergies, current medications, past family history, past medical history, past social history, past surgical history and problem list.    Review of Systems      Objective:      There were no vitals taken for this visit.         Physical Exam  Constitutional:       Appearance: He is well-developed.   HENT:      Head: Normocephalic and atraumatic.      Right Ear: Tympanic membrane, ear canal and external ear normal. No drainage. No middle ear effusion. A PE tube (in ear canal) is present.      Left Ear: Tympanic membrane, ear canal and external ear normal. No drainage.  No middle ear effusion. A PE tube is present.      Nose: Nose normal.      Mouth/Throat:      Pharynx: Uvula midline. No oropharyngeal exudate.      Tonsils: 2+ on the right. 2+ on the left.   Neck:      Thyroid: No thyroid mass or thyromegaly.      Trachea: Trachea normal. No tracheal deviation.   Lymphadenopathy:      Cervical: No cervical adenopathy.   Neurological:      Mental Status: He is alert.         Procedure: Myringotomy and tube insertion (92058)  Indication: right ETD  Surgeon: Harris Gaspar MD  The risks and benefits of the procedure were discussed with the patient, and the patient's consent was obtained.    The patient was placed in the left lateral decubitus position.  The right  ear canal was viewed using the operating otoscope and speculum.  A drop of phenol was placed on the right tympanic membrane and an anteroinferior myringotomy was performed.  A silicone Paparella straight-shank, beveled end, 1.14mm myringotomy tube was inserted.  The patient tolerated the procedure well, and there were no complications.

## 2025-02-07 ENCOUNTER — OFFICE VISIT (OUTPATIENT)
Dept: WOUND CARE | Facility: HOSPITAL | Age: 80
End: 2025-02-07
Payer: COMMERCIAL

## 2025-02-07 VITALS
DIASTOLIC BLOOD PRESSURE: 79 MMHG | HEART RATE: 73 BPM | SYSTOLIC BLOOD PRESSURE: 134 MMHG | RESPIRATION RATE: 18 BRPM | TEMPERATURE: 97.6 F

## 2025-02-07 VITALS
RESPIRATION RATE: 15 BRPM | HEART RATE: 62 BPM | TEMPERATURE: 96.9 F | SYSTOLIC BLOOD PRESSURE: 119 MMHG | DIASTOLIC BLOOD PRESSURE: 67 MMHG

## 2025-02-07 DIAGNOSIS — E11.621 DIABETIC ULCER OF TOE OF RIGHT FOOT ASSOCIATED WITH TYPE 2 DIABETES MELLITUS, WITH NECROSIS OF BONE (HCC): Primary | ICD-10-CM

## 2025-02-07 DIAGNOSIS — E11.621 DIABETIC ULCER OF TOE OF RIGHT FOOT ASSOCIATED WITH TYPE 2 DIABETES MELLITUS, WITH NECROSIS OF BONE (HCC): ICD-10-CM

## 2025-02-07 DIAGNOSIS — L97.514 DIABETIC ULCER OF TOE OF RIGHT FOOT ASSOCIATED WITH TYPE 2 DIABETES MELLITUS, WITH NECROSIS OF BONE (HCC): ICD-10-CM

## 2025-02-07 DIAGNOSIS — L97.514 DIABETIC ULCER OF TOE OF RIGHT FOOT ASSOCIATED WITH TYPE 2 DIABETES MELLITUS, WITH NECROSIS OF BONE (HCC): Primary | ICD-10-CM

## 2025-02-07 LAB
GLUCOSE SERPL-MCNC: 158 MG/DL (ref 65–140)
GLUCOSE SERPL-MCNC: 158 MG/DL (ref 65–140)

## 2025-02-07 PROCEDURE — G0277 HBOT, FULL BODY CHAMBER, 30M: HCPCS | Performed by: FAMILY MEDICINE

## 2025-02-07 PROCEDURE — 99214 OFFICE O/P EST MOD 30 MIN: CPT | Performed by: FAMILY MEDICINE

## 2025-02-07 PROCEDURE — 11042 DBRDMT SUBQ TIS 1ST 20SQCM/<: CPT | Performed by: FAMILY MEDICINE

## 2025-02-07 PROCEDURE — 99183 HYPERBARIC OXYGEN THERAPY: CPT | Performed by: FAMILY MEDICINE

## 2025-02-07 PROCEDURE — 82948 REAGENT STRIP/BLOOD GLUCOSE: CPT | Performed by: FAMILY MEDICINE

## 2025-02-07 RX ORDER — LIDOCAINE 40 MG/G
CREAM TOPICAL ONCE
Status: COMPLETED | OUTPATIENT
Start: 2025-02-07 | End: 2025-02-07

## 2025-02-07 RX ADMIN — LIDOCAINE 1 APPLICATION: 40 CREAM TOPICAL at 10:45

## 2025-02-07 NOTE — PROGRESS NOTES
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 associated with type 2 diabetes mellitus, with necrosis of bone (HCC)  Wound is improved  Debrided as below  Continue wound management with Acticoat, see wound orders below  Tight diabetic control and proper offloading discussed  Continue HBO therapy  Follow-up in 1 week or call sooner with questions or concerns    Lab Results   Component Value Date    HGBA1C 6.2 (H) 10/14/2024       Orders:  •  lidocaine (LMX) 4 % cream  •  Wound cleansing and dressings Diabetic Ulcer Right;Posterior Toe D1, great; Future  •  Wound Procedure Treatment Diabetic Ulcer Right;Posterior Toe D1, great  •  Debridement Diabetic Ulcer Right;Posterior Toe D1, great        History of Present Illness   Chief Complaint   Patient presents with   • Follow Up Wound Care Visit     Right toe   Patient presents for follow-up of a Sumner 4 DFU of the right great toe.  No increased pain or drainage.  Has been using Acticoat on the wound.  Undergoing HBO treatments, completed second treatment today.      Here for wound follow up.    Objective   /67   Pulse 62   Temp (!) 96.9 °F (36.1 °C)   Resp 15     Physical Exam  Wound 24 Diabetic Ulcer Toe D1, great Right;Posterior (Active)   Enter Sumner score: Sumner Grade 4: Partial-foot gangrene 25 1039   Wound Image   25 1106   Wound Description Brown;Eschar;Black;Pink 25 1039   Clara-wound Assessment Dry;Callus 25 1039   Wound Length (cm) 1.2 cm 25 1039   Wound Width (cm) 1.4 cm 25 1039   Wound Depth (cm) 0.1 cm 25 1039   Wound Surface Area (cm^2) 1.68 cm^2 25 1039   Wound Volume (cm^3) 0.168 cm^3 25 1039   Calculated Wound Volume (cm^3) 0.17 cm^3 25 1039   Change in Wound Size % -183.33  "02/07/25 1039   Drainage Amount Small 02/07/25 1039   Drainage Description Serosanguineous 02/07/25 1039   Non-staged Wound Description Full thickness 02/07/25 1039   Dressing Status Intact 02/07/25 1039       Wound 12/16/24 Incision Chest Left;Upper (Active)       Wound 01/07/25 Catheter entry/exit site Groin Right (Active)       Debridement   Wound 11/08/24 Diabetic Ulcer Toe D1, great Right;Posterior    Universal Protocol:  procedure performed by consultantConsent: Verbal consent obtained.  Consent given by: patient  Time out: Immediately prior to procedure a \"time out\" was called to verify the correct patient, procedure, equipment, support staff and site/side marked as required.  Timeout called at: 2/7/2025 10:45 AM.  Patient understanding: patient states understanding of the procedure being performed  Patient identity confirmed: verbally with patient    Debridement Details  Performed by: physician  Debridement type: surgical  Level of debridement: subcutaneous tissue  Pain control: lidocaine 4%      Post-debridement measurements  Length (cm): 1.2  Width (cm): 1.4  Depth (cm): 0.2  Percent debrided: 100%  Surface Area (cm^2): 1.68  Area Debrided (cm^2): 1.68  Volume (cm^3): 0.34    Tissue and other material debrided: subcutaneous tissue  Devitalized tissue debrided: exudate and slough  Instrument(s) utilized: curette  Bleeding: small  Hemostasis obtained with: pressure  Response to treatment: procedure was tolerated well       Results from last 6 Months   Lab Units 01/03/25  1513   WOUND CULTURE  1+ Growth of Staphylococcus aureus*  1+ Growth of           "

## 2025-02-07 NOTE — PATIENT INSTRUCTIONS
Orders Placed This Encounter   Procedures    Wound cleansing and dressings Diabetic Ulcer Right;Posterior Toe D1, great     Right Great Toe Wound:      Wash your hands with soap and water.  Remove old dressing, discard into plastic bag and place in trash.  Cleanse the wound with normal saline prior to applying a clean dressing. Do not use tissue or cotton balls. Do not scrub the wound. Pat dry using gauze.  Shower no, sponge bath only    Discontinue the silver alginate.     Apply acticoat 3 followed by a piece of alginate cut to cover to the open wound.  (The acticoat can discolor the wound area to a gray or black)  Cover with gauze  Secure with rolled gauze and tape  Change dressing 3 times weekly     Edema control:      Elevate extremity to heart level when sitting. Do not leave dependent.      Offloading:      Limit walking to only necessity. Wear surgical shoe to right foot with any walking     Wound infection:  If you have signs of infection please call the wound center.  If the wound center is closed- please go to the Emergency department.  Some signs of infection:  fever, chills, increased redness, red streaks, increase in pain, increased drainage.  Drainage with an odor, Change in drainage color: white/milky/green/tan/yellow,  an increase in swelling, chest pain and/or shortness of breath.      Protein: Eat protein with each meal to promote healing.  Examples of protein are fish, meat, chicken, nuts, peanut butter, eggs, lentils, edamame or a protein shake.     Standing Status:   Future     Expiration Date:   2/14/2025

## 2025-02-07 NOTE — PROGRESS NOTES
HBO Treatment Course Details       Treatment Notes: Treatment tolerated well.  No complaints of pain or discomfort.  Wound care Follow up to follow      Treatment Course Number: 2  Total Treatments Ordered:  40     Diagnosis:   1. Diabetic ulcer of toe of right foot associated with type 2 diabetes mellitus, with necrosis of bone (HCC)  Hyperbaric oxygen thearpy          HBO Treatment Details:  In-Patient Visit: no  Treatment Length:90 Minutes(Minutes)  Chamber #: Hard sided Monoplace Chamber    Pre-Treatment details:  Pre-treatment protocol Treatment Protocol: 2.0 INDY X 90 minutes w/ 100% oxygen, two 5 minute air breaks  Left ear clear?: yes  Right ear clear?:  (some clotted blood lying in canal from recent PE tube reinsertion, otherwise clear enough to see majority of TM)  Left ear intact?: yes  Right ear intact?: yes        PE Tubes present, Left ear?: yes  PE Tubes present, Right ear?: yes  Left ear irrigated?: no  Right ear irrigated?: no  Left ear TEED scale: Grade 0  Right ear TEED scale: Grade I   Pretreatment heart and lung assessment: Pretreatment heart and lung auscltation unremarkable. Patient cleared for HBOT     Treatment details:  INDY Rate: 2  Started Compression: 0825  Reached Compression: 0831  Total Compression Time: 6 (Minutes)  Total Holding Time: 100 (Minutes)  Started Decompression: 1011  Reached Surface: 1017  Total Decompression Time: 6 (Minutes)  Total Airbreaks:   (Minutes)  Total Time of Treatment: 112 (Minutes)  Symptoms Noted During Treatment: None (Minutes)    Post treatment details:  Left ear clear?: yes  Right ear clear?: yes  Left ears intact?: yes  Right ears intact?: yes                    Left ear TEED scale: Grade 0  Right ear TEED scale: Grade 0  Post treatment heart and lung assessment: Post treatment heart and lung auscltation unremarkable. Patient cleared for discharge. Tolerated treatment well.             Vital Signs:  HBO Glucose Reference Range: 100-350 mg/dl    Pre-Treatment Post-Treatment   Time vitals are taken: 0810 Time vitals are taken: 1040   Blood Pressure: 134/79 Blood Pressure: 119/67   Pulse: 73 Pulse: 71   Resp: 18 Resp: 18   Temp: 97.6 °F (36.4 °C) Temp: 96.9 °F (36.1 °C)   Pre-Inspection Glucose readin Post-Inspection Glucose readin       Allergies   Allergen Reactions   • Lisinopril Rash and Lip Swelling     Patient Active Problem List    Diagnosis Date Noted   • Gram-positive bacteremia 2025   • Acute metabolic encephalopathy 2025   • Bacteremia 2025   • S/P placement of cardiac pacemaker 2024   • Bradycardia 2024   • Multiple open wounds of lower extremity 2024   • Ulcer of right foot ([facility]) 2024   • SOB (shortness of breath) 2024   • Nausea 2024   • Elevated troponin 2024   • History of DVT (deep vein thrombosis) 2024   • Diabetic ulcer of toe of right foot associated with type 2 diabetes mellitus, with fat layer exposed ([facility]) 2024   • Plantar fasciitis, right 07/10/2024   • Acute deep vein thrombosis (DVT) of left peroneal vein ([facility]) 2024   • Iron deficiency anemia 2024   • Shortness of breath 2024   • History of colon polyps 2024   • Duodenal ulcer 2024   • Multiple gastric ulcers 2023   • Iron deficiency anemia due to chronic blood loss 2023   • Melena 2023   • Symptomatic anemia 2023   • Frequent PVCs 2023   • Chronic diastolic congestive heart failure ([facility]) 2023   • Acute kidney injury superimposed on stage 3b chronic kidney disease ([facility]) 2023   • Diabetic ulcer of right midfoot associated with diabetes mellitus due to underlying condition, limited to breakdown of skin ([facility]) 06/15/2023   • Hypertensive urgency 2023   • Elevated troponin level not due myocardial infarction 2023   • Stable angina pectoris ([facility]) 2023   • Chronic kidney disease-mineral and bone disorder 2022    • Nonrheumatic aortic valve stenosis 11/11/2022   • Gross hematuria 07/26/2022   • Platelets decreased ([facility]) 07/22/2022   • Acute kidney injury superimposed on chronic kidney disease  ([facility]) 02/16/2022   • Actinic keratoses 01/14/2022   • Type 2 diabetes mellitus with diabetic peripheral angiopathy without gangrene, with long-term current use of insulin ([facility]) 01/11/2022   • Varicose veins of left lower extremity 06/25/2021   • History of endovascular stent graft for abdominal aortic aneurysm (AAA) 06/25/2021   • Bruit (arterial) 06/25/2021   • PAD (peripheral artery disease) ([facility]) 06/25/2021   • Type 2 diabetes mellitus with diabetic polyneuropathy, with long-term current use of insulin ([facility]) 06/10/2021   • Mixed hyperlipidemia 05/11/2021   • Primary hypertension 05/11/2021   • Coronary artery disease involving native coronary artery of native heart without angina pectoris 05/11/2021   • S/P angioplasty with stent 05/11/2021   • Hx of CABG 05/11/2021   • S/P AAA repair 05/11/2021   • S/P prostatectomy 05/11/2021   • H/O prostate cancer 05/11/2021     No orders of the defined types were placed in this encounter.

## 2025-02-07 NOTE — PROGRESS NOTES
Wound Procedure Treatment Diabetic Ulcer Right;Posterior Toe D1, great    Performed by: Evelin Ruiz RN  Authorized by: Nitza Sotelo DO    Associated wounds:   Wound 11/08/24 Diabetic Ulcer Toe D1, great Right;Posterior  Wound cleansed with:  NSS  Applied primary dressing:  Calcium alginate and Other  Applied secondary dressing:  Gauze  Dressing secured with:  Ender and Tape  Comments:  Acticoat 3

## 2025-02-10 ENCOUNTER — RA CDI HCC (OUTPATIENT)
Dept: OTHER | Facility: HOSPITAL | Age: 80
End: 2025-02-10

## 2025-02-10 ENCOUNTER — OFFICE VISIT (OUTPATIENT)
Dept: WOUND CARE | Facility: HOSPITAL | Age: 80
End: 2025-02-10
Payer: COMMERCIAL

## 2025-02-10 DIAGNOSIS — L97.514 DIABETIC ULCER OF TOE OF RIGHT FOOT ASSOCIATED WITH TYPE 2 DIABETES MELLITUS, WITH NECROSIS OF BONE (HCC): ICD-10-CM

## 2025-02-10 DIAGNOSIS — E11.621 DIABETIC ULCER OF TOE OF RIGHT FOOT ASSOCIATED WITH TYPE 2 DIABETES MELLITUS, WITH NECROSIS OF BONE (HCC): ICD-10-CM

## 2025-02-10 LAB
GLUCOSE SERPL-MCNC: 161 MG/DL (ref 65–140)
GLUCOSE SERPL-MCNC: 177 MG/DL (ref 65–140)

## 2025-02-10 PROCEDURE — 99183 HYPERBARIC OXYGEN THERAPY: CPT | Performed by: PHYSICIAN ASSISTANT

## 2025-02-10 PROCEDURE — 82948 REAGENT STRIP/BLOOD GLUCOSE: CPT | Performed by: FAMILY MEDICINE

## 2025-02-10 PROCEDURE — G0277 HBOT, FULL BODY CHAMBER, 30M: HCPCS | Performed by: FAMILY MEDICINE

## 2025-02-10 NOTE — PROGRESS NOTES
HBO Treatment Course Details       Treatment Notes: Thomas tolerated HBOT well.  No complaints.  Dressing change done to right great toe.  Cleansed with NSS then Acticoat 3 and alginate placed over toe.  Gauze pad, kerlix wrap and tape.      Treatment Course Number: 3  Total Treatments Ordered:  40     Diagnosis:   1. Diabetic ulcer of toe of right foot associated with type 2 diabetes mellitus, with necrosis of bone (HCC)  Hyperbaric oxygen thearpy          HBO Treatment Details:  In-Patient Visit: no  Treatment Length:90 Minutes(Minutes)  Chamber #: Hard sided Monoplace Chamber    Pre-Treatment details:  Pre-treatment protocol Treatment Protocol: 2.0 INDY X 90 minutes w/ 100% oxygen, two 5 minute air breaks  Left ear clear?: yes  Right ear clear?: yes  Left ear intact?: yes  Right ear intact?: yes        PE Tubes present, Left ear?: yes (Dried blood and cerumen. Tubes still visualized.)  PE Tubes present, Right ear?: yes (Dried blood and cerumen. Tubes still visualized.)  Left ear irrigated?: no  Right ear irrigated?: no  Left ear TEED scale: Grade 0  Right ear TEED scale: Grade 0   Pretreatment heart and lung assessment: Pretreatment heart and lung auscltation unremarkable. Patient cleared for HBOT     Treatment details:  INDY Rate: 2  Started Compression: 0817  Reached Compression: 0824  Total Compression Time: 7 (Minutes)  Total Holding Time: 100 (Minutes)  Started Decompression: 1004  Reached Surface: 1011  Total Decompression Time: 7 (Minutes)  Total Airbreaks:   (Minutes)  Total Time of Treatment: 114 (Minutes)  Symptoms Noted During Treatment: None (Minutes)    Post treatment details:                                                    Vital Signs:  HBO Glucose Reference Range: 100-350 mg/dl   Pre-Treatment Post-Treatment   Time vitals are taken: 0810 Time vitals are taken: 1012   Blood Pressure: 150/74 Blood Pressure: 138/70   Pulse: 62 Pulse: 72     Resp: 16   Temp: 98 °F (36.7 °C) Temp: 96 °F (35.6 °C)    Pre-Inspection Glucose readin Post-Inspection Glucose readin       Allergies   Allergen Reactions    Lisinopril Rash and Lip Swelling     Patient Active Problem List    Diagnosis Date Noted    Gram-positive bacteremia 2025    Acute metabolic encephalopathy 2025    Bacteremia 2025    S/P placement of cardiac pacemaker 2024    Bradycardia 2024    Multiple open wounds of lower extremity 2024    Ulcer of right foot (HCC) 2024    SOB (shortness of breath) 2024    Nausea 2024    Elevated troponin 2024    History of DVT (deep vein thrombosis) 2024    Diabetic ulcer of toe of right foot associated with type 2 diabetes mellitus, with fat layer exposed ([facility]) 2024    Plantar fasciitis, right 07/10/2024    Acute deep vein thrombosis (DVT) of left peroneal vein ([facility]) 2024    Iron deficiency anemia 2024    Shortness of breath 2024    History of colon polyps 2024    Duodenal ulcer 2024    Multiple gastric ulcers 2023    Iron deficiency anemia due to chronic blood loss 2023    Melena 2023    Symptomatic anemia 2023    Frequent PVCs 2023    Chronic diastolic congestive heart failure ([facility]) 2023    Acute kidney injury superimposed on stage 3b chronic kidney disease ([facility]) 2023    Diabetic ulcer of right midfoot associated with diabetes mellitus due to underlying condition, limited to breakdown of skin ([facility]) 06/15/2023    Hypertensive urgency 2023    Elevated troponin level not due myocardial infarction 2023    Stable angina pectoris ([facility]) 2023    Chronic kidney disease-mineral and bone disorder 2022    Nonrheumatic aortic valve stenosis 2022    Gross hematuria 2022    Platelets decreased ([facility]) 2022    Acute kidney injury superimposed on chronic kidney disease  ([facility]) 2022    Actinic keratoses 2022    Type 2 diabetes mellitus  with diabetic peripheral angiopathy without gangrene, with long-term current use of insulin ([facility]) 01/11/2022    Varicose veins of left lower extremity 06/25/2021    History of endovascular stent graft for abdominal aortic aneurysm (AAA) 06/25/2021    Bruit (arterial) 06/25/2021    PAD (peripheral artery disease) ([facility]) 06/25/2021    Type 2 diabetes mellitus with diabetic polyneuropathy, with long-term current use of insulin ([facility]) 06/10/2021    Mixed hyperlipidemia 05/11/2021    Primary hypertension 05/11/2021    Coronary artery disease involving native coronary artery of native heart without angina pectoris 05/11/2021    S/P angioplasty with stent 05/11/2021    Hx of CABG 05/11/2021    S/P AAA repair 05/11/2021    S/P prostatectomy 05/11/2021    H/O prostate cancer 05/11/2021     No orders of the defined types were placed in this encounter.

## 2025-02-10 NOTE — PROGRESS NOTES
I13.0, E11.51  HCC coding opportunities          Chart Reviewed number of suggestions sent to Provider: 2     Patients Insurance     Medicare Insurance: [employer]

## 2025-02-11 ENCOUNTER — OFFICE VISIT (OUTPATIENT)
Dept: WOUND CARE | Facility: HOSPITAL | Age: 80
End: 2025-02-11
Payer: COMMERCIAL

## 2025-02-11 DIAGNOSIS — E11.621 DIABETIC ULCER OF TOE OF RIGHT FOOT ASSOCIATED WITH TYPE 2 DIABETES MELLITUS, WITH NECROSIS OF BONE (HCC): ICD-10-CM

## 2025-02-11 DIAGNOSIS — L97.514 DIABETIC ULCER OF TOE OF RIGHT FOOT ASSOCIATED WITH TYPE 2 DIABETES MELLITUS, WITH NECROSIS OF BONE (HCC): ICD-10-CM

## 2025-02-11 PROCEDURE — G0277 HBOT, FULL BODY CHAMBER, 30M: HCPCS | Performed by: NURSE PRACTITIONER

## 2025-02-11 PROCEDURE — 82948 REAGENT STRIP/BLOOD GLUCOSE: CPT | Performed by: NURSE PRACTITIONER

## 2025-02-11 PROCEDURE — 99183 HYPERBARIC OXYGEN THERAPY: CPT | Performed by: PHYSICIAN ASSISTANT

## 2025-02-11 NOTE — PROGRESS NOTES
HBO Treatment Course Details       Treatment Notes: Patient tolerated HBOT well.  No complaints     Treatment Course Number: 4  Total Treatments Ordered:  40     Diagnosis:   1. Diabetic ulcer of toe of right foot associated with type 2 diabetes mellitus, with necrosis of bone (HCC)  Hyperbaric oxygen thearpy          HBO Treatment Details:  In-Patient Visit: no  Treatment Length:90 Minutes(Minutes)  Chamber #: Hard sided Monoplace Chamber    Pre-Treatment details:  Pre-treatment protocol Treatment Protocol: 2.0 INDY X 90 minutes w/ 100% oxygen, two 5 minute air breaks  Left ear clear?: yes  Right ear clear?: yes  Left ear intact?: yes  Right ear intact?: yes        PE Tubes present, Left ear?: yes  PE Tubes present, Right ear?: yes  Left ear irrigated?: no  Right ear irrigated?: no  Left ear TEED scale: Grade 0  Right ear TEED scale: Grade 0   Pretreatment heart and lung assessment: Pretreatment heart and lung auscltation unremarkable. Patient cleared for HBOT     Treatment details:  INDY Rate: 2  Started Compression: 0819  Reached Compression: 0825  Total Compression Time: 6 (Minutes)  Total Holding Time: 100 (Minutes)  Started Decompression: 1005  Reached Surface: 1012  Total Decompression Time: 7 (Minutes)  Total Airbreaks:   (Minutes)  Total Time of Treatment: 113 (Minutes)  Symptoms Noted During Treatment: None (Minutes)    Post treatment details:  Left ear clear?: yes  Right ear clear?: yes  Left ears intact?: yes  Right ears intact?: yes        PE Tubes present, Left ear?: yes  PE Tubes present, Right ear?: yes        Left ear TEED scale: Grade 0  Right ear TEED scale: Grade 0  Post treatment heart and lung assessment: Post treatment heart and lung auscltation unremarkable. Patient cleared for discharge. Tolerated treatment well.             Vital Signs:  HBO Glucose Reference Range: 100-350 mg/dl   Pre-Treatment Post-Treatment   Time vitals are taken: 0815 Time vitals are taken: 1014   Blood Pressure: 144/88  Blood Pressure: 160/88   Pulse: 92 Pulse: 80   Resp: 18 Resp: 16   Temp: 96.7 °F (35.9 °C) Temp: 96.8 °F (36 °C)   Pre-Inspection Glucose readin Post-Inspection Glucose readin       Allergies   Allergen Reactions    Lisinopril Rash and Lip Swelling     Patient Active Problem List    Diagnosis Date Noted    Gram-positive bacteremia 2025    Acute metabolic encephalopathy 2025    Bacteremia 2025    S/P placement of cardiac pacemaker 2024    Bradycardia 2024    Multiple open wounds of lower extremity 2024    Ulcer of right foot (HCC) 2024    SOB (shortness of breath) 2024    Nausea 2024    Elevated troponin 2024    History of DVT (deep vein thrombosis) 2024    Diabetic ulcer of toe of right foot associated with type 2 diabetes mellitus, with fat layer exposed (HCC) 2024    Plantar fasciitis, right 07/10/2024    Acute deep vein thrombosis (DVT) of left peroneal vein (HCC) 2024    Iron deficiency anemia 2024    Shortness of breath 2024    History of colon polyps 2024    Duodenal ulcer 2024    Multiple gastric ulcers 2023    Iron deficiency anemia due to chronic blood loss 2023    Melena 2023    Symptomatic anemia 2023    Frequent PVCs 2023    Chronic diastolic congestive heart failure (HCC) 2023    Acute kidney injury superimposed on stage 3b chronic kidney disease (HCC) 2023    Diabetic ulcer of right midfoot associated with diabetes mellitus due to underlying condition, limited to breakdown of skin ([facility]) 06/15/2023    Hypertensive urgency 2023    Elevated troponin level not due myocardial infarction 2023    Stable angina pectoris ([facility]) 2023    Chronic kidney disease-mineral and bone disorder 2022    Nonrheumatic aortic valve stenosis 2022    Gross hematuria 2022    Platelets decreased ([facility]) 2022    Acute kidney injury  superimposed on chronic kidney disease  ([facility]) 02/16/2022    Actinic keratoses 01/14/2022    Type 2 diabetes mellitus with diabetic peripheral angiopathy without gangrene, with long-term current use of insulin ([facility]) 01/11/2022    Varicose veins of left lower extremity 06/25/2021    History of endovascular stent graft for abdominal aortic aneurysm (AAA) 06/25/2021    Bruit (arterial) 06/25/2021    PAD (peripheral artery disease) ([facility]) 06/25/2021    Type 2 diabetes mellitus with diabetic polyneuropathy, with long-term current use of insulin ([facility]) 06/10/2021    Mixed hyperlipidemia 05/11/2021    Primary hypertension 05/11/2021    Coronary artery disease involving native coronary artery of native heart without angina pectoris 05/11/2021    S/P angioplasty with stent 05/11/2021    Hx of CABG 05/11/2021    S/P AAA repair 05/11/2021    S/P prostatectomy 05/11/2021    H/O prostate cancer 05/11/2021     No orders of the defined types were placed in this encounter.

## 2025-02-12 ENCOUNTER — OFFICE VISIT (OUTPATIENT)
Age: 80
End: 2025-02-12
Payer: COMMERCIAL

## 2025-02-12 ENCOUNTER — OFFICE VISIT (OUTPATIENT)
Dept: WOUND CARE | Facility: HOSPITAL | Age: 80
End: 2025-02-12
Payer: COMMERCIAL

## 2025-02-12 VITALS — RESPIRATION RATE: 17 BRPM | HEIGHT: 74 IN | WEIGHT: 220 LBS | BODY MASS INDEX: 28.23 KG/M2

## 2025-02-12 DIAGNOSIS — I70.209 PERIPHERAL ARTERIOSCLEROSIS (HCC): ICD-10-CM

## 2025-02-12 DIAGNOSIS — E11.621 DIABETIC ULCER OF LEFT FOOT ASSOCIATED WITH TYPE 2 DIABETES MELLITUS, WITH MUSCLE INVOLVEMENT WITHOUT EVIDENCE OF NECROSIS, UNSPECIFIED PART OF FOOT (HCC): ICD-10-CM

## 2025-02-12 DIAGNOSIS — E11.621 DIABETIC ULCER OF TOE OF RIGHT FOOT ASSOCIATED WITH TYPE 2 DIABETES MELLITUS, LIMITED TO BREAKDOWN OF SKIN (HCC): Primary | ICD-10-CM

## 2025-02-12 DIAGNOSIS — L97.525 DIABETIC ULCER OF LEFT FOOT ASSOCIATED WITH TYPE 2 DIABETES MELLITUS, WITH MUSCLE INVOLVEMENT WITHOUT EVIDENCE OF NECROSIS, UNSPECIFIED PART OF FOOT (HCC): ICD-10-CM

## 2025-02-12 DIAGNOSIS — M77.41 METATARSALGIA OF BOTH FEET: ICD-10-CM

## 2025-02-12 DIAGNOSIS — M77.42 METATARSALGIA OF BOTH FEET: ICD-10-CM

## 2025-02-12 DIAGNOSIS — E11.42 DIABETIC POLYNEUROPATHY ASSOCIATED WITH TYPE 2 DIABETES MELLITUS (HCC): ICD-10-CM

## 2025-02-12 DIAGNOSIS — M54.16 RADICULOPATHY OF LUMBAR REGION: ICD-10-CM

## 2025-02-12 DIAGNOSIS — L97.511 DIABETIC ULCER OF TOE OF RIGHT FOOT ASSOCIATED WITH TYPE 2 DIABETES MELLITUS, LIMITED TO BREAKDOWN OF SKIN (HCC): Primary | ICD-10-CM

## 2025-02-12 LAB
GLUCOSE SERPL-MCNC: 150 MG/DL (ref 65–140)
GLUCOSE SERPL-MCNC: 156 MG/DL (ref 65–140)
GLUCOSE SERPL-MCNC: 162 MG/DL (ref 65–140)

## 2025-02-12 PROCEDURE — 99183 HYPERBARIC OXYGEN THERAPY: CPT | Performed by: FAMILY MEDICINE

## 2025-02-12 PROCEDURE — G0277 HBOT, FULL BODY CHAMBER, 30M: HCPCS | Performed by: FAMILY MEDICINE

## 2025-02-12 PROCEDURE — 99213 OFFICE O/P EST LOW 20 MIN: CPT | Performed by: PODIATRIST

## 2025-02-12 PROCEDURE — 82948 REAGENT STRIP/BLOOD GLUCOSE: CPT | Performed by: FAMILY MEDICINE

## 2025-02-12 NOTE — PROGRESS NOTES
Assessment/Plan: Diabetic foot ulcer x 3 left foot.  Diabetic neuropathy.  Peripheral artery disease.  Pain.     Plan.  Chart reviewed.  PCP notes reviewed.  Wound healing center notes reviewed.  Patient examined.  At this time we agree with present treatment regimen.  Patient is to continue visitation at wound healing center.  Recommend continuation of HBO.  Patient will follow with vascular surgery.  Patient needs arteriogram of right lower extremity patient will follow with wound healing center.  Watch for signs of infection.  Patient must remain on Trental as ordered.  This will be refilled as indicated.  Patient educated on care of the diabetic foot.  Aftercare instruction given.  Watch for signs of infection or further ulceration.  Recommend continuation of Cymbalta        Assessment  Diagnoses and all orders for this visit:     Diabetic ulcer of left heel associated with type 2 diabetes mellitus, with fat layer exposed (HCC)     Diabetic ulcer of toe of left foot associated with type 2 diabetes mellitus, limited to breakdown of skin (HCC)     Diabetic ulcer of of right toe, Sumner grade 1.   Eschar covering.     Peripheral arteriosclerosis (HCC)     Diabetic polyneuropathy associated with type 2 diabetes mellitus (HCC)     Left foot pain     Onychomycosis              Subjective: Patient is diabetic.  He has peripheral artery disease.  Patient is status post left lower extremity arterial bypass.  He has much less pain in the leg.  He is attending wound healing sessions.  He gets home VNA.  He has no history of fever or night sweats at this time.                         Allergies   Allergen Reactions    Lisinopril Rash and Lip Swelling            Current Medications      Current Outpatient Medications:     acetaminophen (TYLENOL) 325 mg tablet, Take 2 tablets (650 mg total) by mouth every 6 (six) hours as needed for mild pain, Disp: , Rfl:     amLODIPine (NORVASC) 10 mg tablet, Take 0.5 tablets (5 mg total) by  mouth daily, Disp: 45 tablet, Rfl: 1    aspirin 81 mg chewable tablet, Chew 81 mg daily, Disp: , Rfl:     atorvastatin (LIPITOR) 40 mg tablet, Take 1 tablet (40 mg total) by mouth daily, Disp: 90 tablet, Rfl: 1    Blood Glucose Monitoring Suppl (OneTouch Verio Reflect) w/Device KIT, Check blood sugars twice daily. Please substitute with appropriate alternative as covered by patient's insurance. Dx: E11.65, Disp: 1 kit, Rfl: 0    cloNIDine (CATAPRES) 0.1 mg tablet, take 1 tablet by mouth every 12 hours, Disp: 180 tablet, Rfl: 1    clopidogrel (PLAVIX) 75 mg tablet, Take 1 tablet (75 mg total) by mouth daily, Disp: 30 tablet, Rfl: 3    collagenase (SANTYL) ointment, Apply topically daily (Patient not taking: Reported on 9/10/2024), Disp: 30 g, Rfl: 0    Droplet Pen Needles 32G X 4 MM MISC, USE EVERY EVENING, Disp: 100 each, Rfl: 5    DULoxetine (CYMBALTA) 30 mg delayed release capsule, take 1 capsule by mouth once daily, Disp: 90 capsule, Rfl: 0    Empagliflozin (Jardiance) 25 MG TABS, TAKE 1 TABLET BY MOUTH DAILY, Disp: 90 tablet, Rfl: 1    fenofibrate 160 MG tablet, TAKE 1 TABLET BY MOUTH DAILY, Disp: 100 tablet, Rfl: 1    glucose blood (OneTouch Verio) test strip, TEST TWICE A DAY, Disp: 200 strip, Rfl: 5    insulin glargine (LANTUS) 100 units/mL subcutaneous injection, Inject 15 Units under the skin daily at bedtime (Patient taking differently: Inject 15 Units under the skin daily at bedtime If BS > 150), Disp: 10 mL, Rfl: 0    isosorbide mononitrate (IMDUR) 30 mg 24 hr tablet, Take 3 tablets (90 mg total) by mouth daily, Disp: 270 tablet, Rfl: 1    ketoconazole (NIZORAL) 2 % cream, Apply topically daily (Patient not taking: Reported on 9/17/2024), Disp: 60 g, Rfl: 1    metFORMIN (GLUCOPHAGE) 500 mg tablet, Take 1 tablet (500 mg total) by mouth 2 (two) times a day with meals, Disp: 180 tablet, Rfl: 1    metoprolol tartrate (LOPRESSOR) 50 mg tablet, Take 0.5 tablets (25 mg total) by mouth every 12 (twelve) hours  (Patient taking differently: Take 25 mg by mouth 2 (two) times a day), Disp: 90 tablet, Rfl: 1    Multiple Vitamins-Minerals (MULTIVITAMIN MEN 50+ PO), Take by mouth daily, Disp: , Rfl:     nitroglycerin (NITROSTAT) 0.4 mg SL tablet, Place 1 tablet (0.4 mg total) under the tongue every 5 (five) minutes as needed for chest pain, Disp: 30 tablet, Rfl: 0    Omega-3 Fatty Acids (fish oil) 1,000 mg, Take 4,000 mg by mouth 2 (two) times a day, Disp: , Rfl:     ondansetron (ZOFRAN) 8 mg tablet, Take 0.5 tablets (4 mg total) by mouth every 6 (six) hours as needed for nausea or vomiting, Disp: 20 tablet, Rfl: 0    OneTouch Delica Lancets 33G MISC, Check blood sugars twice daily. Please substitute with appropriate alternative as covered by patient's insurance. Dx: E11.65, Disp: 200 each, Rfl: 3    oxyCODONE-acetaminophen (Percocet) 5-325 mg per tablet, Take 1 tablet by mouth 2 (two) times a day as needed for moderate pain or severe pain Max Daily Amount: 2 tablets, Disp: 60 tablet, Rfl: 0    pantoprazole (PROTONIX) 40 mg tablet, Take 1 tablet (40 mg total) by mouth daily, Disp: 90 tablet, Rfl: 1    pentoxifylline (TRENtal) 400 mg ER tablet, TAKE 1 TABLET BY MOUTH 3 TIMES  DAILY WITH MEALS, Disp: 270 tablet, Rfl: 3    pregabalin (LYRICA) 150 mg capsule, Take 1 capsule (150 mg total) by mouth 3 (three) times a day, Disp: 90 capsule, Rfl: 0    ranolazine (RANEXA) 1000 MG SR tablet, Take 1 tablet (1,000 mg total) by mouth 2 (two) times a day, Disp: 180 tablet, Rfl: 3    senna (SENOKOT) 8.6 mg, Take 1 tablet (8.6 mg total) by mouth daily Stool softener for constipation-- hold loose stools (Patient not taking: Reported on 9/11/2024), Disp: , Rfl:     sitaGLIPtin (Januvia) 100 mg tablet, TAKE 1 TABLET BY MOUTH DAILY, Disp: 100 tablet, Rfl: 1    torsemide (DEMADEX) 20 mg tablet, TAKE 1 TABLET BY MOUTH DAILY, Disp: 90 tablet, Rfl: 1         Problem List         Patient Active Problem List   Diagnosis    Mixed hyperlipidemia    Primary  hypertension    Coronary artery disease involving native coronary artery of native heart without angina pectoris    S/P angioplasty with stent    Hx of CABG    S/P AAA repair    S/P prostatectomy    H/O prostate cancer    Type 2 diabetes mellitus with diabetic polyneuropathy, with long-term current use of insulin (HCC)    Varicose veins of left lower extremity    History of endovascular stent graft for abdominal aortic aneurysm (AAA)    Bruit (arterial)    Peripheral artery disease (HCC)    Type 2 diabetes mellitus with diabetic peripheral angiopathy without gangrene, with long-term current use of insulin (HCC)    Actinic keratoses    Acute kidney injury superimposed on chronic kidney disease  (HCC)    Platelets decreased (HCC)    Gross hematuria    Chronic HFpEF/Moderate pHTN (HFpEF) (HCC)    Mild aortic stenosis    Chronic kidney disease-mineral and bone disorder    Stable angina pectoris    Hypertensive urgency    Elevated troponin level not due myocardial infarction    Diabetic ulcer of right midfoot associated with diabetes mellitus due to underlying condition, limited to breakdown of skin (HCC)    Acute on chronic diastolic heart failure (HCC)    Stage 3b chronic kidney disease (HCC)    Frequent PVCs    Melena    Symptomatic anemia    Multiple gastric ulcers    Iron deficiency anemia due to chronic blood loss    Duodenal ulcer    History of colon polyps    Shortness of breath    Iron deficiency anemia    Acute deep vein thrombosis (DVT) of left peroneal vein (HCC)    Plantar fasciitis, right    Diabetic ulcer of toe of left foot associated with type 2 diabetes mellitus, limited to breakdown of skin (HCC)    History of DVT (deep vein thrombosis)    SOB (shortness of breath)    Nausea    Elevated troponin    Foot ulcer (HCC)               Subjective  Patient ID: Thomas Horne is a 80 y.o. male.     HPI     The following portions of the patient's history were reviewed and updated as appropriate: He  has a past  medical history of Anemia, CAD (coronary artery disease), Callus, Cancer ([facility]), CHF (congestive heart failure) ([facility]), Chronic kidney disease, Clotting disorder ([facility]), Coronary artery disease (1988), Deep vein thrombosis ([facility]), Diabetes mellitus ([facility]), Difficulty walking, Duodenal ulcer, Heart disease (1988), Hyperlipidemia, Hypertension, Myocardial infarction ([facility]), Neuropathy, Neuropathy in diabetes ([facility]), Plantar fasciitis, and Sleep apnea.  He           Patient Active Problem List     Diagnosis Date Noted    Foot ulcer ([facility]) 09/07/2024    SOB (shortness of breath) 09/06/2024    Nausea 09/06/2024    Elevated troponin 09/06/2024    History of DVT (deep vein thrombosis) 08/26/2024    Diabetic ulcer of toe of left foot associated with type 2 diabetes mellitus, limited to breakdown of skin ([facility]) 08/08/2024    Plantar fasciitis, right 07/10/2024    Acute deep vein thrombosis (DVT) of left peroneal vein ([facility]) 05/01/2024    Iron deficiency anemia 04/23/2024    Shortness of breath 04/09/2024    History of colon polyps 02/22/2024    Duodenal ulcer 02/01/2024    Multiple gastric ulcers 12/27/2023    Iron deficiency anemia due to chronic blood loss 12/27/2023    Melena 12/26/2023    Symptomatic anemia 12/26/2023    Frequent PVCs 06/17/2023    Acute on chronic diastolic heart failure ([facility]) 06/16/2023    Stage 3b chronic kidney disease ([facility]) 06/16/2023    Diabetic ulcer of right midfoot associated with diabetes mellitus due to underlying condition, limited to breakdown of skin ([facility]) 06/15/2023    Hypertensive urgency 05/11/2023    Elevated troponin level not due myocardial infarction 05/11/2023    Stable angina pectoris 03/07/2023    Chronic kidney disease-mineral and bone disorder 11/30/2022    Mild aortic stenosis 11/11/2022    Chronic HFpEF/Moderate pHTN (HFpEF) ([facility]) 11/10/2022    Gross hematuria 07/26/2022    Platelets decreased ([facility]) 07/22/2022    Acute kidney injury superimposed on chronic kidney disease  ([facility])  02/16/2022    Actinic keratoses 01/14/2022    Type 2 diabetes mellitus with diabetic peripheral angiopathy without gangrene, with long-term current use of insulin (HCC) 01/11/2022    Varicose veins of left lower extremity 06/25/2021    History of endovascular stent graft for abdominal aortic aneurysm (AAA) 06/25/2021    Bruit (arterial) 06/25/2021    Peripheral artery disease (HCC) 06/25/2021    Type 2 diabetes mellitus with diabetic polyneuropathy, with long-term current use of insulin (HCC) 06/10/2021    Mixed hyperlipidemia 05/11/2021    Primary hypertension 05/11/2021    Coronary artery disease involving native coronary artery of native heart without angina pectoris 05/11/2021    S/P angioplasty with stent 05/11/2021    Hx of CABG 05/11/2021    S/P AAA repair 05/11/2021    S/P prostatectomy 05/11/2021    H/O prostate cancer 05/11/2021      He  has a past surgical history that includes Cardiac surgery (2002); Tonsillectomy; ADENOIDECTOMY; Coronary artery bypass graft; Cholecystectomy; Prostate surgery; Cardiac catheterization (Left, 10/19/2022); Colonoscopy (02/14/2024); pr slctv cathj 3rd+ ord slctv abdl pel/lxtr brnch (Right, 11/01/2023); IR lower extremity angiogram (11/01/2023); pr bypass w/vein femoral-popliteal (Right, 11/16/2023); Abdominal aortic aneurysm repair; Urinary sphincter implant; IR lower extremity angiogram (08/07/2024); pr slctv cathj 3rd+ ord slctv abdl pel/lxtr brnch (Left, 08/07/2024); Back surgery (1985); Laminectomy (1990); and pr bypass w/vein femoral-popliteal (Left, 8/30/2024).  His family history includes Alcohol abuse in his father; Cancer in his brother, brother, sister, and sister; Diabetes in his mother and sister; Heart disease in his brother.  He  reports that he quit smoking about 36 years ago. His smoking use included cigarettes. He started smoking about 68 years ago. He has a 49.5 pack-year smoking history. He has been exposed to tobacco smoke. He has never used smokeless  tobacco. He reports that he does not currently use alcohol after a past usage of about 5.0 standard drinks of alcohol per week. He reports that he does not use drugs.  Current Rx             Current Outpatient Medications on File Prior to Visit   Medication Sig    acetaminophen (TYLENOL) 325 mg tablet Take 2 tablets (650 mg total) by mouth every 6 (six) hours as needed for mild pain    amLODIPine (NORVASC) 10 mg tablet Take 0.5 tablets (5 mg total) by mouth daily    aspirin 81 mg chewable tablet Chew 81 mg daily    atorvastatin (LIPITOR) 40 mg tablet Take 1 tablet (40 mg total) by mouth daily    Blood Glucose Monitoring Suppl (OneTouch Verio Reflect) w/Device KIT Check blood sugars twice daily. Please substitute with appropriate alternative as covered by patient's insurance. Dx: E11.65    cloNIDine (CATAPRES) 0.1 mg tablet take 1 tablet by mouth every 12 hours    clopidogrel (PLAVIX) 75 mg tablet Take 1 tablet (75 mg total) by mouth daily    collagenase (SANTYL) ointment Apply topically daily (Patient not taking: Reported on 9/10/2024)    Droplet Pen Needles 32G X 4 MM MISC USE EVERY EVENING    DULoxetine (CYMBALTA) 30 mg delayed release capsule take 1 capsule by mouth once daily    Empagliflozin (Jardiance) 25 MG TABS TAKE 1 TABLET BY MOUTH DAILY    fenofibrate 160 MG tablet TAKE 1 TABLET BY MOUTH DAILY    glucose blood (OneTouch Verio) test strip TEST TWICE A DAY    insulin glargine (LANTUS) 100 units/mL subcutaneous injection Inject 15 Units under the skin daily at bedtime (Patient taking differently: Inject 15 Units under the skin daily at bedtime If BS > 150)    isosorbide mononitrate (IMDUR) 30 mg 24 hr tablet Take 3 tablets (90 mg total) by mouth daily    ketoconazole (NIZORAL) 2 % cream Apply topically daily (Patient not taking: Reported on 9/17/2024)    metFORMIN (GLUCOPHAGE) 500 mg tablet Take 1 tablet (500 mg total) by mouth 2 (two) times a day with meals    metoprolol tartrate (LOPRESSOR) 50 mg tablet  Take 0.5 tablets (25 mg total) by mouth every 12 (twelve) hours (Patient taking differently: Take 25 mg by mouth 2 (two) times a day)    Multiple Vitamins-Minerals (MULTIVITAMIN MEN 50+ PO) Take by mouth daily    nitroglycerin (NITROSTAT) 0.4 mg SL tablet Place 1 tablet (0.4 mg total) under the tongue every 5 (five) minutes as needed for chest pain    Omega-3 Fatty Acids (fish oil) 1,000 mg Take 4,000 mg by mouth 2 (two) times a day    ondansetron (ZOFRAN) 8 mg tablet Take 0.5 tablets (4 mg total) by mouth every 6 (six) hours as needed for nausea or vomiting    OneTouch Delica Lancets 33G MISC Check blood sugars twice daily. Please substitute with appropriate alternative as covered by patient's insurance. Dx: E11.65    oxyCODONE-acetaminophen (Percocet) 5-325 mg per tablet Take 1 tablet by mouth 2 (two) times a day as needed for moderate pain or severe pain Max Daily Amount: 2 tablets    pantoprazole (PROTONIX) 40 mg tablet Take 1 tablet (40 mg total) by mouth daily    pentoxifylline (TRENtal) 400 mg ER tablet TAKE 1 TABLET BY MOUTH 3 TIMES  DAILY WITH MEALS    pregabalin (LYRICA) 150 mg capsule Take 1 capsule (150 mg total) by mouth 3 (three) times a day    ranolazine (RANEXA) 1000 MG SR tablet Take 1 tablet (1,000 mg total) by mouth 2 (two) times a day    senna (SENOKOT) 8.6 mg Take 1 tablet (8.6 mg total) by mouth daily Stool softener for constipation-- hold loose stools (Patient not taking: Reported on 9/11/2024)    sitaGLIPtin (Januvia) 100 mg tablet TAKE 1 TABLET BY MOUTH DAILY    torsemide (DEMADEX) 20 mg tablet TAKE 1 TABLET BY MOUTH DAILY      No current facility-administered medications on file prior to visit.      He is allergic to lisinopril..         Objective:  Patient's shoes and socks removed.  Objective  Foot Exam     General  General Appearance: appears stated age and healthy   Orientation: alert and oriented to person, place, and time   Affect: appropriate   Gait: antalgic         Right Foot/Ankle       Inspection and Palpation  Swelling: dorsum   Arch: pes cavus  Hallux limitus: yes  Skin Exam: dry skin;      Neurovascular  Dorsalis pedis: 1+  Posterior tibial: 1+  Saphenous nerve sensation: diminished  Tibial nerve sensation: diminished  Superficial peroneal nerve sensation: diminished  Deep peroneal nerve sensation: diminished  Sural nerve sensation: diminished        Left Foot/Ankle       Inspection and Palpation  Tenderness: bony tenderness and calcaneus tenderness   Swelling: dorsum   Arch: pes cavus  Hallux limitus: yes  Skin Exam: dry skin;      Neurovascular  Dorsalis pedis: 1+  Posterior tibial: 1+  Saphenous nerve sensation: diminished  Tibial nerve sensation: diminished  Superficial peroneal nerve sensation: diminished  Deep peroneal nerve sensation: diminished  Sural nerve sensation: diminished        Physical Exam  Vitals and nursing note reviewed.   Constitutional:       Appearance: Normal appearance.   Cardiovascular:      Rate and Rhythm: Normal rate and regular rhythm.      Pulses: Pulses are weak.           Dorsalis pedis pulses are 1+ on the right side and 1+ on the left side.        Posterior tibial pulses are 1+ on the right side and 1+ on the left side.   Musculoskeletal:      Left foot: Bony tenderness present.   Feet:      Right foot:      Skin integrity: Dry skin present.      Left foot:      Skin integrity: Dry skin present.      Comments: Patient is 1 left foot diabetic ulcers.  Large plantar medial aspect ulcer of the heel is a Sumner grade 2.  Negative cellulitis.  Lateral aspect fifth met base demonstrates 1.0 cm grade Sumner grade 1 ulcer.  Distal aspect second left toe demonstrates 0.5 cm² Sumner grade 1 ulcer.  Ulcers demonstrate no evidence of abscess surrounding mild undermining.  Negative cellulitis.  Skin:     Capillary Refill: Capillary refill takes 2 to 3 seconds.   Psychiatric:         Mood and Affect: Mood normal.         Behavior: Behavior normal.         Thought  Content: Thought content normal.         Judgment: Judgment normal.      Patient's shoes and socks removed.     Right Foot/Ankle   Right Foot Inspection  Skin Exam: dry skin.      Toe Exam: tenderness and right toe deformity.      Sensory   Vibration: absent  Proprioception: diminished  Monofilament testing: diminished     Vascular  Capillary refills: < 3 seconds  The right DP pulse is 1+. The right PT pulse is 1+.      Right Toe  - Comprehensive Exam  Arch: pes cavus  Hallux limitus: yes  Swelling: dorsum         Left Foot/Ankle  Left Foot Inspection  Skin Exam: dry skin.      Toe Exam: tenderness and left toe deformity.      Sensory   Vibration: absent  Proprioception: diminished  Monofilament testing: diminished     Vascular  Capillary refills: < 3 seconds  The left DP pulse is 1+. The left PT pulse is 1+.      Left Toe  - Comprehensive Exam  Arch: pes cavus  Hallux limitus: yes  Swelling: dorsum   Tenderness: bony tenderness and calcaneus tenderness         Assign Risk Category  Deformity present  Loss of protective sensation  Weak pulses  Risk: 2

## 2025-02-12 NOTE — PROGRESS NOTES
HBO Treatment Course Details       Treatment Notes: Patient tolerated HBOT well.     Treatment Course Number: 5  Total Treatments Ordered:  40     Diagnosis:   1. Diabetic ulcer of left foot associated with type 2 diabetes mellitus, with muscle involvement without evidence of necrosis, unspecified part of foot (HCC)  Hyperbaric oxygen [place]          HBO Treatment Details:  In-Patient Visit: no  Treatment Length:90 Minutes(Minutes)  Chamber #: Hard sided Monoplace Chamber    Pre-Treatment details:  Pre-treatment protocol Treatment Protocol: 2.0 INDY X 90 minutes w/ 100% oxygen, two 5 minute air breaks  Left ear clear?: yes  Right ear clear?: yes  Left ear intact?: yes  Right ear intact?: yes  Left ear color: Pink  Right ear color: pink  PE Tubes present, Left ear?: yes  PE Tubes present, Right ear?: yes  Left ear irrigated?: no  Right ear irrigated?: no  Left ear TEED scale: Grade I  Right ear TEED scale: Grade I   Pretreatment heart and lung assessment: Pretreatment heart and lung auscltation unremarkable. Patient cleared for HBOT     Treatment details:  INDY Rate: 2  Started Compression: 1042  Reached Compression: 1050  Total Compression Time: 8 (Minutes)  Total Holding Time: 100 (Minutes)  Started Decompression: 1230  Reached Surface: 1237  Total Decompression Time: 7 (Minutes)  Total Airbreaks:   (Minutes)  Total Time of Treatment: 115 (Minutes)  Symptoms Noted During Treatment: None (Minutes)    Post treatment details:                                                    Vital Signs:  HBO Glucose Reference Range: 100-350 mg/dl   Pre-Treatment Post-Treatment   Time vitals are taken: 1040 Time vitals are taken: 1240   Blood Pressure: 140/70 Blood Pressure: 124/74   Pulse: 68 Pulse: 78   Resp: 18 Resp: 16   Temp: 96.7 °F (35.9 °C) Temp: 96.4 °F (35.8 °C)   Pre-Inspection Glucose readin Post-Inspection Glucose readin       Allergies   Allergen Reactions    Lisinopril Rash and Lip Swelling     Patient  Active Problem List    Diagnosis Date Noted    Gram-positive bacteremia 01/05/2025    Acute metabolic encephalopathy 01/05/2025    Bacteremia 01/04/2025    S/P placement of cardiac pacemaker 12/20/2024    Bradycardia 12/12/2024    Multiple open wounds of lower extremity 12/12/2024    Ulcer of right foot (HCC) 09/07/2024    SOB (shortness of breath) 09/06/2024    Nausea 09/06/2024    Elevated troponin 09/06/2024    History of DVT (deep vein thrombosis) 08/26/2024    Diabetic ulcer of toe of right foot associated with type 2 diabetes mellitus, with fat layer exposed ([facility]) 08/08/2024    Plantar fasciitis, right 07/10/2024    Acute deep vein thrombosis (DVT) of left peroneal vein ([facility]) 05/01/2024    Iron deficiency anemia 04/23/2024    Shortness of breath 04/09/2024    History of colon polyps 02/22/2024    Duodenal ulcer 02/01/2024    Multiple gastric ulcers 12/27/2023    Iron deficiency anemia due to chronic blood loss 12/27/2023    Melena 12/26/2023    Symptomatic anemia 12/26/2023    Frequent PVCs 06/17/2023    Chronic diastolic congestive heart failure ([facility]) 06/16/2023    Acute kidney injury superimposed on stage 3b chronic kidney disease ([facility]) 06/16/2023    Diabetic ulcer of right midfoot associated with diabetes mellitus due to underlying condition, limited to breakdown of skin ([facility]) 06/15/2023    Hypertensive urgency 05/11/2023    Elevated troponin level not due myocardial infarction 05/11/2023    Stable angina pectoris ([facility]) 03/07/2023    Chronic kidney disease-mineral and bone disorder 11/30/2022    Nonrheumatic aortic valve stenosis 11/11/2022    Gross hematuria 07/26/2022    Platelets decreased ([facility]) 07/22/2022    Acute kidney injury superimposed on chronic kidney disease  ([facility]) 02/16/2022    Actinic keratoses 01/14/2022    Type 2 diabetes mellitus with diabetic peripheral angiopathy without gangrene, with long-term current use of insulin ([facility]) 01/11/2022    Varicose veins of left lower extremity 06/25/2021     History of endovascular stent graft for abdominal aortic aneurysm (AAA) 06/25/2021    Bruit (arterial) 06/25/2021    PAD (peripheral artery disease) ([facility]) 06/25/2021    Type 2 diabetes mellitus with diabetic polyneuropathy, with long-term current use of insulin ([facility]) 06/10/2021    Mixed hyperlipidemia 05/11/2021    Primary hypertension 05/11/2021    Coronary artery disease involving native coronary artery of native heart without angina pectoris 05/11/2021    S/P angioplasty with stent 05/11/2021    Hx of CABG 05/11/2021    S/P AAA repair 05/11/2021    S/P prostatectomy 05/11/2021    H/O prostate cancer 05/11/2021     No orders of the defined types were placed in this encounter.

## 2025-02-12 NOTE — PROGRESS NOTES
This a [facility] HBO Treatment Course Details       Treatment Notes: Patient prevent presents for HBO dive     Treatment Course Number: 5  Total Treatments Ordered:  40     Diagnosis:   1. Diabetic ulcer of left foot associated with type 2 diabetes mellitus, with muscle involvement without evidence of necrosis, unspecified part of foot (HCC)  Hyperbaric oxygen [facility]          HBO Treatment Details:  In-Patient Visit: no  Treatment Length:90 Minutes(Minutes)  Chamber #: Hard sided Monoplace Chamber    Pre-Treatment details:  Pre-treatment protocol Treatment Protocol: 2.0 INDY X 90 minutes w/ 100% oxygen, two 5 minute air breaks  Left ear clear?: yes  Right ear clear?: yes  Left ear intact?: yes  Right ear intact?: yes  Left ear color: Pink  Right ear color: pink  PE Tubes present, Left ear?: yes  PE Tubes present, Right ear?: yes  Left ear irrigated?: no  Right ear irrigated?: no  Left ear TEED scale: Grade I  Right ear TEED scale: Grade I   Pretreatment heart and lung assessment: Pretreatment heart and lung auscltation unremarkable. Patient cleared for HBOT     Treatment details:  INDY Rate: 2  Started Compression: 1042  Reached Compression: 1050  Total Compression Time: 8 (Minutes)  Total Holding Time: 100 (Minutes)  Started Decompression: 1230  Reached Surface: 1237  Total Decompression Time: 7 (Minutes)  Total Airbreaks:   (Minutes)  Total Time of Treatment: 115 (Minutes)  Symptoms Noted During Treatment: None (Minutes)    Post treatment details:  Left ear clear?: yes  Right ear clear?: yes  Left ears intact?: yes  Right ears intact?: yes  Left ear color: Pink  Right ear color: Pink  PE Tubes present, Left ear?: yes  PE Tubes present, Right ear?: yes        Left ear TEED scale: Grade I  Right ear TEED scale: Grade I  Post treatment heart and lung assessment: Post treatment heart and lung auscltation unremarkable. Patient cleared for discharge. Tolerated treatment well.  HBO Supervision: Patient tolerated  dive well          Vital Signs:  HBO Glucose Reference Range: 100-350 mg/dl   Pre-Treatment Post-Treatment   Time vitals are taken: 1040 Time vitals are taken: 1240   Blood Pressure: 140/70 Blood Pressure: 124/74   Pulse: 68 Pulse: 78   Resp: 18 Resp: 16   Temp: 96.7 °F (35.9 °C) Temp: 96.4 °F (35.8 °C)   Pre-Inspection Glucose readin Post-Inspection Glucose readin       Allergies   Allergen Reactions   • Lisinopril Rash and Lip Swelling     Patient Active Problem List    Diagnosis Date Noted   • Gram-positive bacteremia 2025   • Acute metabolic encephalopathy 2025   • Bacteremia 2025   • S/P placement of cardiac pacemaker 2024   • Bradycardia 2024   • Multiple open wounds of lower extremity 2024   • Ulcer of right foot (HCC) 2024   • SOB (shortness of breath) 2024   • Nausea 2024   • Elevated troponin 2024   • History of DVT (deep vein thrombosis) 2024   • Diabetic ulcer of toe of right foot associated with type 2 diabetes mellitus, with fat layer exposed ([facility]) 2024   • Plantar fasciitis, right 07/10/2024   • Acute deep vein thrombosis (DVT) of left peroneal vein ([facility]) 2024   • Iron deficiency anemia 2024   • Shortness of breath 2024   • History of colon polyps 2024   • Duodenal ulcer 2024   • Multiple gastric ulcers 2023   • Iron deficiency anemia due to chronic blood loss 2023   • Melena 2023   • Symptomatic anemia 2023   • Frequent PVCs 2023   • Chronic diastolic congestive heart failure (HCC) 2023   • Acute kidney injury superimposed on stage 3b chronic kidney disease ([facility]) 2023   • Diabetic ulcer of right midfoot associated with diabetes mellitus due to underlying condition, limited to breakdown of skin ([facility]) 06/15/2023   • Hypertensive urgency 2023   • Elevated troponin level not due myocardial infarction 2023   • Stable angina pectoris ([facility])  03/07/2023   • Chronic kidney disease-mineral and bone disorder 11/30/2022   • Nonrheumatic aortic valve stenosis 11/11/2022   • Gross hematuria 07/26/2022   • Platelets decreased ([facility]) 07/22/2022   • Acute kidney injury superimposed on chronic kidney disease  ([facility]) 02/16/2022   • Actinic keratoses 01/14/2022   • Type 2 diabetes mellitus with diabetic peripheral angiopathy without gangrene, with long-term current use of insulin ([facility]) 01/11/2022   • Varicose veins of left lower extremity 06/25/2021   • History of endovascular stent graft for abdominal aortic aneurysm (AAA) 06/25/2021   • Bruit (arterial) 06/25/2021   • PAD (peripheral artery disease) ([facility]) 06/25/2021   • Type 2 diabetes mellitus with diabetic polyneuropathy, with long-term current use of insulin ([facility]) 06/10/2021   • Mixed hyperlipidemia 05/11/2021   • Primary hypertension 05/11/2021   • Coronary artery disease involving native coronary artery of native heart without angina pectoris 05/11/2021   • S/P angioplasty with stent 05/11/2021   • Hx of CABG 05/11/2021   • S/P AAA repair 05/11/2021   • S/P prostatectomy 05/11/2021   • H/O prostate cancer 05/11/2021     No orders of the defined types were placed in this encounter.

## 2025-02-13 ENCOUNTER — OFFICE VISIT (OUTPATIENT)
Dept: VASCULAR SURGERY | Facility: CLINIC | Age: 80
End: 2025-02-13
Payer: COMMERCIAL

## 2025-02-13 ENCOUNTER — OFFICE VISIT (OUTPATIENT)
Dept: WOUND CARE | Facility: HOSPITAL | Age: 80
End: 2025-02-13
Payer: COMMERCIAL

## 2025-02-13 VITALS
WEIGHT: 224 LBS | DIASTOLIC BLOOD PRESSURE: 64 MMHG | BODY MASS INDEX: 28.75 KG/M2 | HEIGHT: 74 IN | HEART RATE: 68 BPM | SYSTOLIC BLOOD PRESSURE: 110 MMHG

## 2025-02-13 DIAGNOSIS — E11.621 DIABETIC ULCER OF LEFT FOOT ASSOCIATED WITH TYPE 2 DIABETES MELLITUS, WITH MUSCLE INVOLVEMENT WITHOUT EVIDENCE OF NECROSIS, UNSPECIFIED PART OF FOOT (HCC): ICD-10-CM

## 2025-02-13 DIAGNOSIS — I73.9 PERIPHERAL ARTERY DISEASE (HCC): Primary | ICD-10-CM

## 2025-02-13 DIAGNOSIS — I73.9 PAD (PERIPHERAL ARTERY DISEASE) (HCC): ICD-10-CM

## 2025-02-13 DIAGNOSIS — L97.525 DIABETIC ULCER OF LEFT FOOT ASSOCIATED WITH TYPE 2 DIABETES MELLITUS, WITH MUSCLE INVOLVEMENT WITHOUT EVIDENCE OF NECROSIS, UNSPECIFIED PART OF FOOT (HCC): ICD-10-CM

## 2025-02-13 LAB
GLUCOSE SERPL-MCNC: 194 MG/DL (ref 65–140)
GLUCOSE SERPL-MCNC: 206 MG/DL (ref 65–140)

## 2025-02-13 PROCEDURE — 99183 HYPERBARIC OXYGEN THERAPY: CPT | Performed by: FAMILY MEDICINE

## 2025-02-13 PROCEDURE — 99214 OFFICE O/P EST MOD 30 MIN: CPT | Performed by: SURGERY

## 2025-02-13 PROCEDURE — G0277 HBOT, FULL BODY CHAMBER, 30M: HCPCS | Performed by: FAMILY MEDICINE

## 2025-02-13 PROCEDURE — 82948 REAGENT STRIP/BLOOD GLUCOSE: CPT | Performed by: FAMILY MEDICINE

## 2025-02-13 NOTE — ASSESSMENT & PLAN NOTE
Very pleasant 80-year-old male known well to the practice with recurrence of right diabetic foot ulcer now status post arteriographic intervention and stenting after occlusion of a right lower extremity CryoVein bypass which lasted long enough to heal a separate previous ulcer on this foot.  At this time we will continue with a 3-month surveillance duplex.  He should continue his medical therapy.

## 2025-02-13 NOTE — PATIENT INSTRUCTIONS
1. Peripheral artery disease (HCC)  -     VAS ARTERIAL DUPLEX- LOWER LIMB BILATERAL; Future; Expected date: 05/13/2025  2. PAD (peripheral artery disease) ([facility])  Assessment & Plan:  Very pleasant 80-year-old male known well to the practice with recurrence of right diabetic foot ulcer now status post arteriographic intervention and stenting after occlusion of a right lower extremity CryoVein bypass which lasted long enough to heal a separate previous ulcer on this foot.  At this time we will continue with a 3-month surveillance duplex.  He should continue his medical therapy.

## 2025-02-13 NOTE — PROGRESS NOTES
"Name: Thomas Horne      : 1945      MRN: [id number]  Encounter Provider: Vasquez Clark MD  Encounter Date: 2025   Encounter department: [facility] [place]  :  Assessment & Plan  Peripheral artery disease (HCC)    Orders:    VAS ARTERIAL DUPLEX- LOWER LIMB BILATERAL; Future    PAD (peripheral artery disease) (HCC)  Very pleasant 80-year-old male known well to the practice with recurrence of right diabetic foot ulcer now status post arteriographic intervention and stenting after occlusion of a right lower extremity CryoVein bypass which lasted long enough to heal a separate previous ulcer on this foot.  At this time we will continue with a 3-month surveillance duplex.  He should continue his medical therapy.             History of Present Illness   HPI  Thomas Horne is a very pleasant 80-year-old male who is happy to report improved wound healing of his right great toe after successful arteriographic intervention to his right lower extremity.  He has a tremendous sense of humor and is always a pollo to have in the office.  He has full motor function of his feet and chronic neuropathy.  He lives with his daughters who are doing well.  He has no other acute complaints and has no complications associated with his surgery.  He is reliably taking his medical therapy.    Patient presents as ED Follow-up for RLE non-healing wound.         Review of Systems   Constitutional: Negative.    HENT: Negative.     Eyes: Negative.    Respiratory: Negative.     Cardiovascular: Negative.    Gastrointestinal: Negative.    Endocrine: Negative.    Genitourinary: Negative.    Musculoskeletal: Negative.    Skin:  Positive for wound.   Allergic/Immunologic: Negative.    Neurological: Negative.    Hematological: Negative.    Psychiatric/Behavioral: Negative.            Objective   /64 (BP Location: Left arm, Patient Position: Sitting, Cuff Size: Standard)   Pulse 68   Ht 6' 2\" (1.88 m)   Wt 102 " kg (224 lb)   BMI 28.76 kg/m²      Physical Exam  Vitals and nursing note reviewed.   Constitutional:       General: He is not in acute distress.     Appearance: He is well-developed.   HENT:      Head: Normocephalic and atraumatic.   Eyes:      Conjunctiva/sclera: Conjunctivae normal.   Cardiovascular:      Rate and Rhythm: Normal rate and regular rhythm.      Heart sounds: No murmur heard.     Comments: Right great toe wound, trending toward healing when compared with pictures.  Pulmonary:      Effort: Pulmonary effort is normal. No respiratory distress.      Breath sounds: Normal breath sounds.   Abdominal:      Palpations: Abdomen is soft.      Tenderness: There is no abdominal tenderness.   Musculoskeletal:         General: No swelling.      Cervical back: Neck supple.   Skin:     General: Skin is warm and dry.      Capillary Refill: Capillary refill takes less than 2 seconds.   Neurological:      Mental Status: He is alert.   Psychiatric:         Mood and Affect: Mood normal.

## 2025-02-13 NOTE — LETTER
2025     Frank Lombardi, DO  [address]  [address]  [facility] [address]    Patient: Thomas Horne   YOB: 1945   Date of Visit: 2025       Dear Dr. Lombardi:    Thank you for referring Thomas Horne to me for evaluation. Below are my notes for this consultation.    If you have questions, please do not hesitate to call me. I look forward to following your patient along with you.         Sincerely,        Vasquez Clark MD        CC: Thomas Clark MD  2025  1:18 PM  Incomplete  Name: Thomas Horne      : 1945      MRN: [id number]  Encounter Provider: Vasquez Clark MD  Encounter Date: 2025   Enc[facility] department: [facility] [place]  :  Assessment & Plan  Peripheral artery disease (HCC)    Orders:  •  VAS ARTERIAL DUPLEX- LOWER LIMB BILATERAL; Future    PAD (peripheral artery disease) (HCC)  Very pleasant 80-year-old male known well to the practice with recurrence of right diabetic foot ulcer now status post arteriographic intervention and stenting after occlusion of a right lower extremity CryoVein bypass which lasted long enough to heal a separate previous ulcer on this foot.  At this time we will continue with a 3-month surveillance duplex.  He should continue his medical therapy.             History of Present Illness  HPI  Thomas Horne is a very pleasant 80-year-old male who is happy to report improved wound healing of his right great toe after successful arteriographic intervention to his right lower extremity.  He has a tremendous sense of humor and is always a pollo to have in the office.  He has full motor function of his feet and chronic neuropathy.  He lives with his daughters who are doing well.  He has no other acute complaints and has no complications associated with his surgery.  He is reliably taking his medical therapy.    Patient presents as ED Follow-up for RLE non-healing wound.  "        Review of Systems   Constitutional: Negative.    HENT: Negative.     Eyes: Negative.    Respiratory: Negative.     Cardiovascular: Negative.    Gastrointestinal: Negative.    Endocrine: Negative.    Genitourinary: Negative.    Musculoskeletal: Negative.    Skin:  Positive for wound.   Allergic/Immunologic: Negative.    Neurological: Negative.    Hematological: Negative.    Psychiatric/Behavioral: Negative.            Objective  /64 (BP Location: Left arm, Patient Position: Sitting, Cuff Size: Standard)   Pulse 68   Ht 6' 2\" (1.88 m)   Wt 102 kg (224 lb)   BMI 28.76 kg/m²      Physical Exam  Vitals and nursing note reviewed.   Constitutional:       General: He is not in acute distress.     Appearance: He is well-developed.   HENT:      Head: Normocephalic and atraumatic.   Eyes:      Conjunctiva/sclera: Conjunctivae normal.   Cardiovascular:      Rate and Rhythm: Normal rate and regular rhythm.      Heart sounds: No murmur heard.     Comments: Right great toe wound, trending toward healing when compared with pictures.  Pulmonary:      Effort: Pulmonary effort is normal. No respiratory distress.      Breath sounds: Normal breath sounds.   Abdominal:      Palpations: Abdomen is soft.      Tenderness: There is no abdominal tenderness.   Musculoskeletal:         General: No swelling.      Cervical back: Neck supple.   Skin:     General: Skin is warm and dry.      Capillary Refill: Capillary refill takes less than 2 seconds.   Neurological:      Mental Status: He is alert.   Psychiatric:         Mood and Affect: Mood normal.              "

## 2025-02-13 NOTE — PROGRESS NOTES
HBO Treatment Course Details       Treatment Notes: Patient tolerated HBOT well. No complaints offered.      Treatment Course Number: 6  Total Treatments Ordered:  40     Diagnosis:   1. Diabetic ulcer of left foot associated with type 2 diabetes mellitus, with muscle involvement without evidence of necrosis, unspecified part of foot (HCC)  Hyperbaric oxygen thearpy    Fingerstick Glucose (POCT)    Fingerstick Glucose (POCT)          HBO Treatment Details:  In-Patient Visit: no  Treatment Length:90 Minutes(Minutes)  Chamber #: Hard sided Monoplace Chamber    Pre-Treatment details:  Pre-treatment protocol Treatment Protocol: 2.0 INDY X 90 minutes w/ 100% oxygen, two 5 minute air breaks  Left ear clear?: yes  Right ear clear?: yes  Left ear intact?: yes  Right ear intact?: yes        PE Tubes present, Left ear?: yes  PE Tubes present, Right ear?: yes  Left ear irrigated?: no  Right ear irrigated?: no  Left ear TEED scale: Grade 0  Right ear TEED scale: Grade 0   Pretreatment heart and lung assessment: Pretreatment heart and lung auscltation unremarkable. Patient cleared for HBOT     Treatment details:  INDY Rate: 2  Started Compression: 0821  Reached Compression: 0829  Total Compression Time: 8 (Minutes)  Total Holding Time: 100 (Minutes)  Started Decompression: 1009  Reached Surface: 1019  Total Decompression Time: 10 (Minutes)  Total Airbreaks:   (Minutes)  Total Time of Treatment: 118 (Minutes)  Symptoms Noted During Treatment: None (Minutes)    Post treatment details:  Left ear clear?: yes  Right ear clear?: yes              PE Tubes present, Left ear?: yes  PE Tubes present, Right ear?: yes        Left ear TEED scale: Grade 0  Right ear TEED scale: Grade 0  Post treatment heart and lung assessment: Post treatment heart and lung auscltation unremarkable. Patient cleared for discharge. Tolerated treatment well.             Vital Signs:  HBO Glucose Reference Range: 100-350 mg/dl   Pre-Treatment Post-Treatment   Time  vitals are taken: 0815 Time vitals are taken: 1025   Blood Pressure: 120/74 Blood Pressure: 150/90   Pulse: 64 Pulse: 72   Resp: 18 Resp: 15   Temp: 97.6 °F (36.4 °C) Temp: 96.8 °F (36 °C)   Pre-Inspection Glucose readin Post-Inspection Glucose readin       Allergies   Allergen Reactions   • Lisinopril Rash and Lip Swelling     Patient Active Problem List    Diagnosis Date Noted   • Gram-positive bacteremia 2025   • Acute metabolic encephalopathy 2025   • Bacteremia 2025   • S/P placement of cardiac pacemaker 2024   • Bradycardia 2024   • Multiple open wounds of lower extremity 2024   • Ulcer of right foot ([facility]) 2024   • SOB (shortness of breath) 2024   • Nausea 2024   • Elevated troponin 2024   • History of DVT (deep vein thrombosis) 2024   • Diabetic ulcer of toe of right foot associated with type 2 diabetes mellitus, with fat layer exposed ([facility]) 2024   • Plantar fasciitis, right 07/10/2024   • Acute deep vein thrombosis (DVT) of left peroneal vein ([facility]) 2024   • Iron deficiency anemia 2024   • Shortness of breath 2024   • History of colon polyps 2024   • Duodenal ulcer 2024   • Multiple gastric ulcers 2023   • Iron deficiency anemia due to chronic blood loss 2023   • Melena 2023   • Symptomatic anemia 2023   • Frequent PVCs 2023   • Chronic diastolic congestive heart failure ([facility]) 2023   • Acute kidney injury superimposed on stage 3b chronic kidney disease ([facility]) 2023   • Diabetic ulcer of right midfoot associated with diabetes mellitus due to underlying condition, limited to breakdown of skin ([facility]) 06/15/2023   • Hypertensive urgency 2023   • Elevated troponin level not due myocardial infarction 2023   • Stable angina pectoris ([facility]) 2023   • Chronic kidney disease-mineral and bone disorder 2022   • Nonrheumatic aortic valve stenosis  11/11/2022   • Gross hematuria 07/26/2022   • Platelets decreased ([facility]) 07/22/2022   • Acute kidney injury superimposed on chronic kidney disease  ([facility]) 02/16/2022   • Actinic keratoses 01/14/2022   • Type 2 diabetes mellitus with diabetic peripheral angiopathy without gangrene, with long-term current use of insulin ([facility]) 01/11/2022   • Varicose veins of left lower extremity 06/25/2021   • History of endovascular stent graft for abdominal aortic aneurysm (AAA) 06/25/2021   • Bruit (arterial) 06/25/2021   • PAD (peripheral artery disease) ([facility]) 06/25/2021   • Type 2 diabetes mellitus with diabetic polyneuropathy, with long-term current use of insulin ([facility]) 06/10/2021   • Mixed hyperlipidemia 05/11/2021   • Primary hypertension 05/11/2021   • Coronary artery disease involving native coronary artery of native heart without angina pectoris 05/11/2021   • S/P angioplasty with stent 05/11/2021   • Hx of CABG 05/11/2021   • S/P AAA repair 05/11/2021   • S/P prostatectomy 05/11/2021   • H/O prostate cancer 05/11/2021     No orders of the defined types were placed in this encounter.

## 2025-02-13 NOTE — PROGRESS NOTES
HBO Treatment Course Details       Treatment Notes: ***     Treatment Course Number: 6  Total Treatments Ordered:  40     Diagnosis:   1. Diabetic ulcer of left foot associated with type 2 diabetes mellitus, with muscle involvement without evidence of necrosis, unspecified part of foot (HCC)  Hyperbaric oxygen [facility]          HBO Treatment Details:  In-Patient Visit: {YES NO AND WILDCARDS:89290}  Treatment Length:90 Minutes(Minutes)  Chamber #: Hard sided Monoplace Chamber    Pre-Treatment details:  Pre-treatment protocol Treatment Protocol: 2.0 INDY X 90 minutes w/ 100% oxygen, two 5 minute air breaks  Left ear clear?: yes  Right ear clear?: yes  Left ear intact?: yes  Right ear intact?: yes        PE Tubes present, Left ear?: yes  PE Tubes present, Right ear?: yes  Left ear irrigated?: no  Right ear irrigated?: no  Left ear TEED scale: Grade 0  Right ear TEED scale: Grade 0   Pretreatment heart and lung assessment: Pretreatment heart and lung auscltation unremarkable. Patient cleared for HBOT     Treatment details:  INDY Rate: 2  Started Compression: 0821  Reached Compression: 0829  Total Compression Time: 8 (Minutes)  Total Holding Time: 100 (Minutes)  Started Decompression: 1009  Reached Surface: 1019  Total Decompression Time: 10 (Minutes)  Total Airbreaks:   (Minutes)  Total Time of Treatment: 118 (Minutes)  Symptoms Noted During Treatment:   (Minutes)    Post treatment details:                                                    Vital Signs:  HBO Glucose Reference Range: 100-350 mg/dl   Pre-Treatment Post-Treatment   Time vitals are taken: 0815     Blood Pressure: 120/74     Pulse: 64     Resp: 18     Temp: 97.6 °F (36.4 °C)     Pre-Inspection Glucose readin         Allergies   Allergen Reactions    Lisinopril Rash and Lip Swelling     Patient Active Problem List    Diagnosis Date Noted    Gram-positive bacteremia 2025    Acute metabolic encephalopathy 2025    Bacteremia 2025    S/P  placement of cardiac pacemaker 12/20/2024    Bradycardia 12/12/2024    Multiple open wounds of lower extremity 12/12/2024    Ulcer of right foot (HCC) 09/07/2024    SOB (shortness of breath) 09/06/2024    Nausea 09/06/2024    Elevated troponin 09/06/2024    History of DVT (deep vein thrombosis) 08/26/2024    Diabetic ulcer of toe of right foot associated with type 2 diabetes mellitus, with fat layer exposed ([facility]) 08/08/2024    Plantar fasciitis, right 07/10/2024    Acute deep vein thrombosis (DVT) of left peroneal vein ([facility]) 05/01/2024    Iron deficiency anemia 04/23/2024    Shortness of breath 04/09/2024    History of colon polyps 02/22/2024    Duodenal ulcer 02/01/2024    Multiple gastric ulcers 12/27/2023    Iron deficiency anemia due to chronic blood loss 12/27/2023    Melena 12/26/2023    Symptomatic anemia 12/26/2023    Frequent PVCs 06/17/2023    Chronic diastolic congestive heart failure ([facility]) 06/16/2023    Acute kidney injury superimposed on stage 3b chronic kidney disease ([facility]) 06/16/2023    Diabetic ulcer of right midfoot associated with diabetes mellitus due to underlying condition, limited to breakdown of skin ([facility]) 06/15/2023    Hypertensive urgency 05/11/2023    Elevated troponin level not due myocardial infarction 05/11/2023    Stable angina pectoris ([facility]) 03/07/2023    Chronic kidney disease-mineral and bone disorder 11/30/2022    Nonrheumatic aortic valve stenosis 11/11/2022    Gross hematuria 07/26/2022    Platelets decreased ([facility]) 07/22/2022    Acute kidney injury superimposed on chronic kidney disease  ([facility]) 02/16/2022    Actinic keratoses 01/14/2022    Type 2 diabetes mellitus with diabetic peripheral angiopathy without gangrene, with long-term current use of insulin ([facility]) 01/11/2022    Varicose veins of left lower extremity 06/25/2021    History of endovascular stent graft for abdominal aortic aneurysm (AAA) 06/25/2021    Bruit (arterial) 06/25/2021    PAD (peripheral artery disease) ([facility])  06/25/2021    Type 2 diabetes mellitus with diabetic polyneuropathy, with long-term current use of insulin (HCC) 06/10/2021    Mixed hyperlipidemia 05/11/2021    Primary hypertension 05/11/2021    Coronary artery disease involving native coronary artery of native heart without angina pectoris 05/11/2021    S/P angioplasty with stent 05/11/2021    Hx of CABG 05/11/2021    S/P AAA repair 05/11/2021    S/P prostatectomy 05/11/2021    H/O prostate cancer 05/11/2021     No orders of the defined types were placed in this encounter.

## 2025-02-14 ENCOUNTER — OFFICE VISIT (OUTPATIENT)
Dept: WOUND CARE | Facility: HOSPITAL | Age: 80
End: 2025-02-14
Payer: COMMERCIAL

## 2025-02-14 DIAGNOSIS — E11.621 DIABETIC ULCER OF TOE OF RIGHT FOOT ASSOCIATED WITH TYPE 2 DIABETES MELLITUS, WITH NECROSIS OF BONE (HCC): Primary | ICD-10-CM

## 2025-02-14 DIAGNOSIS — L97.514 DIABETIC ULCER OF TOE OF RIGHT FOOT ASSOCIATED WITH TYPE 2 DIABETES MELLITUS, WITH NECROSIS OF BONE (HCC): Primary | ICD-10-CM

## 2025-02-14 DIAGNOSIS — L97.525 DIABETIC ULCER OF LEFT FOOT ASSOCIATED WITH TYPE 2 DIABETES MELLITUS, WITH MUSCLE INVOLVEMENT WITHOUT EVIDENCE OF NECROSIS, UNSPECIFIED PART OF FOOT (HCC): ICD-10-CM

## 2025-02-14 DIAGNOSIS — E11.621 DIABETIC ULCER OF LEFT FOOT ASSOCIATED WITH TYPE 2 DIABETES MELLITUS, WITH MUSCLE INVOLVEMENT WITHOUT EVIDENCE OF NECROSIS, UNSPECIFIED PART OF FOOT (HCC): ICD-10-CM

## 2025-02-14 LAB
GLUCOSE SERPL-MCNC: 161 MG/DL (ref 65–140)
GLUCOSE SERPL-MCNC: 192 MG/DL (ref 65–140)
GLUCOSE SERPL-MCNC: 77 MG/DL (ref 65–140)

## 2025-02-14 PROCEDURE — 11042 DBRDMT SUBQ TIS 1ST 20SQCM/<: CPT | Performed by: FAMILY MEDICINE

## 2025-02-14 PROCEDURE — G0277 HBOT, FULL BODY CHAMBER, 30M: HCPCS | Performed by: FAMILY MEDICINE

## 2025-02-14 PROCEDURE — 82948 REAGENT STRIP/BLOOD GLUCOSE: CPT | Performed by: FAMILY MEDICINE

## 2025-02-14 PROCEDURE — 99183 HYPERBARIC OXYGEN THERAPY: CPT | Performed by: FAMILY MEDICINE

## 2025-02-14 RX ORDER — LIDOCAINE HYDROCHLORIDE 40 MG/ML
5 SOLUTION TOPICAL ONCE
Status: COMPLETED | OUTPATIENT
Start: 2025-02-14 | End: 2025-02-14

## 2025-02-14 RX ADMIN — LIDOCAINE HYDROCHLORIDE 5 ML: 40 SOLUTION TOPICAL at 10:33

## 2025-02-14 NOTE — PROGRESS NOTES
Name: Thomas Horne      : 1945      MRN: [id number]  Encounter Provider: Nitza Sotelo DO  Encounter Date: 2025   Encounter department: [facility] WOUND CARE  :  Assessment & Plan  Diabetic ulcer of toe of right foot associated with type 2 diabetes mellitus, with necrosis of bone (HCC)  Wound is improved  Debrided as below  Continue wound management with Acticoat, see wound orders below  Tight diabetic control and proper offloading discussed  Continue HBO therapy  Follow-up in 1 week or call sooner with questions or concerns     Lab Results   Component Value Date    HGBA1C 6.2 (H) 10/14/2024       Orders:  •  lidocaine (XYLOCAINE) 4 % topical solution 5 mL  •  Wound cleansing and dressings Diabetic Ulcer Right;Posterior Toe D1, great; Future  •  Wound Procedure Treatment Diabetic Ulcer Right;Posterior Toe D1, great  •  Debridement Diabetic Ulcer Right;Posterior Toe D1, great        History of Present Illness   Chief Complaint   Patient presents with   • Follow Up Wound Care Visit     Right great toe   Patient presents for follow-up of a Sumner 4 DFU of the right great toe.  No increased pain or drainage.  Has been using Acticoat on the wound.  Undergoing HBO treatments, completed 7th treatment today.      Here for wound follow up.    Objective   There were no vitals taken for this visit.    Physical Exam  Wound 24 Diabetic Ulcer Toe D1, great Right;Posterior (Active)   Enter Sumner score: Sumner Grade 4: Partial-foot gangrene 25 1027   Wound Image   25 1121   Wound Description Yellow;Slough;Pink 25 1027   Clara-wound Assessment Dry;Callus 25 1027   Wound Length (cm) 0.5 cm 25 1027   Wound Width (cm) 1.3 cm 25 1027   Wound Depth (cm) 0.2 cm 25 1027   Wound Surface Area (cm^2) 0.65 cm^2 25 1027   Wound Volume (cm^3) 0.13 cm^3 25 1027   Calculated Wound Volume (cm^3) 0.13 cm^3 25 1027   Change in Wound Size % -116.67  "02/14/25 1027   Drainage Amount Small 02/14/25 1027   Drainage Description Serosanguineous 02/14/25 1027   Non-staged Wound Description Full thickness 02/14/25 1027   Dressing Status Intact 02/14/25 1027       Wound 12/16/24 Incision Chest Left;Upper (Active)       Wound 01/07/25 Catheter entry/exit site Groin Right (Active)       Debridement   Wound 11/08/24 Diabetic Ulcer Toe D1, great Right;Posterior    Universal Protocol:  procedure performed by consultantConsent: Verbal consent obtained.  Consent given by: patient  Time out: Immediately prior to procedure a \"time out\" was called to verify the correct patient, procedure, equipment, support staff and site/side marked as required.  Timeout called at: 2/14/2025 11:20 AM.  Patient understanding: patient states understanding of the procedure being performed  Patient identity confirmed: verbally with patient    Debridement Details  Performed by: physician  Debridement type: surgical  Level of debridement: subcutaneous tissue  Pain control: lidocaine 4%      Post-debridement measurements  Length (cm): 0.5  Width (cm): 1.3  Depth (cm): 0.3  Percent debrided: 100%  Surface Area (cm^2): 0.65  Area Debrided (cm^2): 0.65  Volume (cm^3): 0.2    Tissue and other material debrided: subcutaneous tissue  Devitalized tissue debrided: exudate and slough  Instrument(s) utilized: curette  Bleeding: small  Hemostasis obtained with: pressure  Response to treatment: procedure was tolerated well       Results from last 6 Months   Lab Units 01/03/25  1513   WOUND CULTURE  1+ Growth of Staphylococcus aureus*  1+ Growth of           "

## 2025-02-14 NOTE — PATIENT INSTRUCTIONS
Orders Placed This Encounter   Procedures    Wound cleansing and dressings Diabetic Ulcer Right;Posterior Toe D1, great     Right Great Toe Wound:     Wash your hands with soap and water. Remove old dressing, discard into plastic bag and place in trash. Cleanse the wound with normal saline prior to applying a clean dressing. Do not use tissue or cotton balls. Do not scrub the wound. Pat dry using gauze.     Shower no, sponge bath only Discontinue the silver alginate.     Apply acticoat 3 to wound bed. (The acticoat can discolor the wound area to a gray or black)   Cover with gauze   Secure with rolled gauze and tape     Change dressing 3 times weekly     Edema control: Elevate extremity to heart level when sitting. Do not leave dependent.     Offloading: Limit walking to only necessity. Wear surgical shoe to right foot with any walking Wound infection: If you have signs of infection please call the wound center. If the wound center is closedplease go to the Emergency department. Some signs of infection: fever, chills, increased redness, red streaks, increase in pain, increased drainage. Drainage with an odor, Change in drainage color: white/milky/green/tan/yellow, an increase in swelling, chest pain and/or shortness of breath.     Protein: Eat protein with each meal to promote healing. Examples of protein are fish, meat, chicken, nuts, peanut butter, eggs, lentils, edamame or a protein shake.     Standing Status:   Future     Expiration Date:   2/21/2025

## 2025-02-14 NOTE — PROGRESS NOTES
HBO Treatment Course Details       Treatment Notes: Patient tolerated HBOT well.  No complaints.  Patient to see MD post dive for wound care visit.      Treatment Course Number: 7  Total Treatments Ordered:  40     Diagnosis:   1. Diabetic ulcer of left foot associated with type 2 diabetes mellitus, with muscle involvement without evidence of necrosis, unspecified part of foot (HCC)  [place] [facility]          HBO Treatment Details:  In-Patient Visit: no  Treatment Length:90 Minutes(Minutes)  Chamber #: Hard sided Monoplace Chamber    Pre-Treatment details:  Pre-treatment protocol Treatment Protocol: 2.0 INDY X 90 minutes w/ 100% oxygen, two 5 minute air breaks  Left ear clear?: yes  Right ear clear?: yes  Left ear intact?: yes  Right ear intact?: yes        PE Tubes present, Left ear?: yes  PE Tubes present, Right ear?: yes  Left ear irrigated?: no  Right ear irrigated?: no  Left ear TEED scale: Grade 0  Right ear TEED scale: Grade 0   Pretreatment heart and lung assessment: Pretreatment heart and lung auscltation unremarkable. Patient cleared for HBOT     Treatment details:  INDY Rate: 2  Started Compression: 0814  Reached Compression: 0822  Total Compression Time: 8 (Minutes)  Total Holding Time: 100 (Minutes)  Started Decompression: 1002  Reached Surface: 1012  Total Decompression Time: 10 (Minutes)  Total Airbreaks:   (Minutes)  Total Time of Treatment: 118 (Minutes)  Symptoms Noted During Treatment: None (Minutes)    Post treatment details:  Left ear clear?: yes  Right ear clear?: yes              PE Tubes present, Left ear?: yes  PE Tubes present, Right ear?: yes        Left ear TEED scale: Grade 0  Right ear TEED scale: Grade 0  Post treatment heart and lung assessment: Post treatment heart and lung auscltation unremarkable. Patient cleared for discharge. Tolerated treatment well.             Vital Signs:  HBO Glucose Reference Range: 100-350 mg/dl   Pre-Treatment Post-Treatment   Time vitals are  taken: 0810 Time vitals are taken: 1015   Blood Pressure: 128/70 Blood Pressure: 110/74   Pulse: 70 Pulse: 70   Resp: 18 Resp: 16   Temp: 97.6 °F (36.4 °C) Temp: 96.7 °F (35.9 °C)   Pre-Inspection Glucose readin Post-Inspection Glucose readin       Allergies   Allergen Reactions   • Lisinopril Rash and Lip Swelling     Patient Active Problem List    Diagnosis Date Noted   • Gram-positive bacteremia 2025   • Acute metabolic encephalopathy 2025   • Bacteremia 2025   • S/P placement of cardiac pacemaker 2024   • Bradycardia 2024   • Multiple open wounds of lower extremity 2024   • Ulcer of right foot ([facility]) 2024   • SOB (shortness of breath) 2024   • Nausea 2024   • Elevated troponin 2024   • History of DVT (deep vein thrombosis) 2024   • Diabetic ulcer of toe of right foot associated with type 2 diabetes mellitus, with fat layer exposed ([facility]) 2024   • Plantar fasciitis, right 07/10/2024   • Acute deep vein thrombosis (DVT) of left peroneal vein ([facility]) 2024   • Iron deficiency anemia 2024   • Shortness of breath 2024   • History of colon polyps 2024   • Duodenal ulcer 2024   • Multiple gastric ulcers 2023   • Iron deficiency anemia due to chronic blood loss 2023   • Melena 2023   • Symptomatic anemia 2023   • Frequent PVCs 2023   • Chronic diastolic congestive heart failure ([facility]) 2023   • Acute kidney injury superimposed on stage 3b chronic kidney disease ([facility]) 2023   • Diabetic ulcer of right midfoot associated with diabetes mellitus due to underlying condition, limited to breakdown of skin ([facility]) 06/15/2023   • Hypertensive urgency 2023   • Elevated troponin level not due myocardial infarction 2023   • Stable angina pectoris ([facility]) 2023   • Chronic kidney disease-mineral and bone disorder 2022   • Nonrheumatic aortic valve stenosis 2022    • Gross hematuria 07/26/2022   • Platelets decreased ([facility]) 07/22/2022   • Acute kidney injury superimposed on chronic kidney disease  ([facility]) 02/16/2022   • Actinic keratoses 01/14/2022   • Type 2 diabetes mellitus with diabetic peripheral angiopathy without gangrene, with long-term current use of insulin ([facility]) 01/11/2022   • Varicose veins of left lower extremity 06/25/2021   • History of endovascular stent graft for abdominal aortic aneurysm (AAA) 06/25/2021   • Bruit (arterial) 06/25/2021   • PAD (peripheral artery disease) ([facility]) 06/25/2021   • Type 2 diabetes mellitus with diabetic polyneuropathy, with long-term current use of insulin ([facility]) 06/10/2021   • Mixed hyperlipidemia 05/11/2021   • Primary hypertension 05/11/2021   • Coronary artery disease involving native coronary artery of native heart without angina pectoris 05/11/2021   • S/P angioplasty with stent 05/11/2021   • Hx of CABG 05/11/2021   • S/P AAA repair 05/11/2021   • S/P prostatectomy 05/11/2021   • H/O prostate cancer 05/11/2021     No orders of the defined types were placed in this encounter.

## 2025-02-14 NOTE — PROGRESS NOTES
Wound Procedure Treatment Diabetic Ulcer Right;Posterior Toe D1, great    Performed by: Nadege Pinzon RN  Authorized by: Nitza Soetlo DO    Associated wounds:   Wound 11/08/24 Diabetic Ulcer Toe D1, great Right;Posterior  Wound cleansed with:  NSS  Applied primary dressing:  Acticoat  Applied secondary dressing:  Gauze  Dressing secured with:  Ender and Tape  Comments:  Acticoat 3

## 2025-02-15 ENCOUNTER — APPOINTMENT (OUTPATIENT)
Dept: LAB | Facility: HOSPITAL | Age: 80
End: 2025-02-15
Attending: FAMILY MEDICINE
Payer: COMMERCIAL

## 2025-02-15 DIAGNOSIS — E78.2 MIXED HYPERLIPIDEMIA: ICD-10-CM

## 2025-02-15 DIAGNOSIS — E11.51 TYPE 2 DIABETES MELLITUS WITH DIABETIC PERIPHERAL ANGIOPATHY WITHOUT GANGRENE, WITH LONG-TERM CURRENT USE OF INSULIN (HCC): ICD-10-CM

## 2025-02-15 DIAGNOSIS — Z79.4 TYPE 2 DIABETES MELLITUS WITH DIABETIC PERIPHERAL ANGIOPATHY WITHOUT GANGRENE, WITH LONG-TERM CURRENT USE OF INSULIN (HCC): ICD-10-CM

## 2025-02-15 DIAGNOSIS — I10 PRIMARY HYPERTENSION: ICD-10-CM

## 2025-02-15 DIAGNOSIS — D50.9 IRON DEFICIENCY ANEMIA, UNSPECIFIED IRON DEFICIENCY ANEMIA TYPE: ICD-10-CM

## 2025-02-15 DIAGNOSIS — K26.9 DUODENAL ULCER: ICD-10-CM

## 2025-02-15 LAB
ALBUMIN SERPL BCG-MCNC: 4.2 G/DL (ref 3.5–5)
ALP SERPL-CCNC: 34 U/L (ref 34–104)
ALT SERPL W P-5'-P-CCNC: 17 U/L (ref 7–52)
ANION GAP SERPL CALCULATED.3IONS-SCNC: 13 MMOL/L (ref 4–13)
AST SERPL W P-5'-P-CCNC: 21 U/L (ref 13–39)
BASOPHILS # BLD AUTO: 0.04 THOUSANDS/ΜL (ref 0–0.1)
BASOPHILS NFR BLD AUTO: 1 % (ref 0–1)
BILIRUB SERPL-MCNC: 0.57 MG/DL (ref 0.2–1)
BUN SERPL-MCNC: 35 MG/DL (ref 5–25)
CALCIUM SERPL-MCNC: 9.4 MG/DL (ref 8.4–10.2)
CHLORIDE SERPL-SCNC: 101 MMOL/L (ref 96–108)
CO2 SERPL-SCNC: 26 MMOL/L (ref 21–32)
CREAT SERPL-MCNC: 1.37 MG/DL (ref 0.6–1.3)
CREAT UR-MCNC: 63 MG/DL
EOSINOPHIL # BLD AUTO: 0.19 THOUSAND/ΜL (ref 0–0.61)
EOSINOPHIL NFR BLD AUTO: 2 % (ref 0–6)
ERYTHROCYTE [DISTWIDTH] IN BLOOD BY AUTOMATED COUNT: 15.8 % (ref 11.6–15.1)
EST. AVERAGE GLUCOSE BLD GHB EST-MCNC: 137 MG/DL
GFR SERPL CREATININE-BSD FRML MDRD: 47 ML/MIN/1.73SQ M
GLUCOSE P FAST SERPL-MCNC: 132 MG/DL (ref 65–99)
HBA1C MFR BLD: 6.4 %
HCT VFR BLD AUTO: 43.4 % (ref 36.5–49.3)
HGB BLD-MCNC: 13.8 G/DL (ref 12–17)
IMM GRANULOCYTES # BLD AUTO: 0.03 THOUSAND/UL (ref 0–0.2)
IMM GRANULOCYTES NFR BLD AUTO: 0 % (ref 0–2)
LYMPHOCYTES # BLD AUTO: 1.5 THOUSANDS/ΜL (ref 0.6–4.47)
LYMPHOCYTES NFR BLD AUTO: 18 % (ref 14–44)
MCH RBC QN AUTO: 27.9 PG (ref 26.8–34.3)
MCHC RBC AUTO-ENTMCNC: 31.8 G/DL (ref 31.4–37.4)
MCV RBC AUTO: 88 FL (ref 82–98)
MICROALBUMIN UR-MCNC: <7 MG/L
MONOCYTES # BLD AUTO: 0.46 THOUSAND/ΜL (ref 0.17–1.22)
MONOCYTES NFR BLD AUTO: 5 % (ref 4–12)
NEUTROPHILS # BLD AUTO: 6.25 THOUSANDS/ΜL (ref 1.85–7.62)
NEUTS SEG NFR BLD AUTO: 74 % (ref 43–75)
NRBC BLD AUTO-RTO: 0 /100 WBCS
PLATELET # BLD AUTO: 208 THOUSANDS/UL (ref 149–390)
PMV BLD AUTO: 12.3 FL (ref 8.9–12.7)
POTASSIUM SERPL-SCNC: 4.2 MMOL/L (ref 3.5–5.3)
PROT SERPL-MCNC: 7.4 G/DL (ref 6.4–8.4)
RBC # BLD AUTO: 4.95 MILLION/UL (ref 3.88–5.62)
SODIUM SERPL-SCNC: 140 MMOL/L (ref 135–147)
WBC # BLD AUTO: 8.47 THOUSAND/UL (ref 4.31–10.16)

## 2025-02-15 PROCEDURE — 83036 HEMOGLOBIN GLYCOSYLATED A1C: CPT

## 2025-02-15 PROCEDURE — 82570 ASSAY OF URINE CREATININE: CPT

## 2025-02-15 PROCEDURE — 80053 COMPREHEN METABOLIC PANEL: CPT

## 2025-02-15 PROCEDURE — 82043 UR ALBUMIN QUANTITATIVE: CPT

## 2025-02-15 PROCEDURE — 36415 COLL VENOUS BLD VENIPUNCTURE: CPT

## 2025-02-15 PROCEDURE — 85025 COMPLETE CBC W/AUTO DIFF WBC: CPT

## 2025-02-17 ENCOUNTER — OFFICE VISIT (OUTPATIENT)
Dept: WOUND CARE | Facility: HOSPITAL | Age: 80
End: 2025-02-17
Payer: COMMERCIAL

## 2025-02-17 VITALS
TEMPERATURE: 96.1 F | RESPIRATION RATE: 18 BRPM | SYSTOLIC BLOOD PRESSURE: 142 MMHG | DIASTOLIC BLOOD PRESSURE: 80 MMHG | HEART RATE: 56 BPM

## 2025-02-17 DIAGNOSIS — L97.514 DIABETIC ULCER OF TOE OF RIGHT FOOT ASSOCIATED WITH TYPE 2 DIABETES MELLITUS, WITH NECROSIS OF BONE (HCC): ICD-10-CM

## 2025-02-17 DIAGNOSIS — I25.10 CORONARY ARTERY DISEASE INVOLVING NATIVE CORONARY ARTERY OF NATIVE HEART WITHOUT ANGINA PECTORIS: ICD-10-CM

## 2025-02-17 DIAGNOSIS — E78.2 MIXED HYPERLIPIDEMIA: ICD-10-CM

## 2025-02-17 DIAGNOSIS — E11.621 DIABETIC ULCER OF LEFT FOOT ASSOCIATED WITH TYPE 2 DIABETES MELLITUS, WITH MUSCLE INVOLVEMENT WITHOUT EVIDENCE OF NECROSIS, UNSPECIFIED PART OF FOOT (HCC): ICD-10-CM

## 2025-02-17 DIAGNOSIS — E11.621 DIABETIC ULCER OF TOE OF RIGHT FOOT ASSOCIATED WITH TYPE 2 DIABETES MELLITUS, WITH NECROSIS OF BONE (HCC): ICD-10-CM

## 2025-02-17 DIAGNOSIS — L97.525 DIABETIC ULCER OF LEFT FOOT ASSOCIATED WITH TYPE 2 DIABETES MELLITUS, WITH MUSCLE INVOLVEMENT WITHOUT EVIDENCE OF NECROSIS, UNSPECIFIED PART OF FOOT (HCC): ICD-10-CM

## 2025-02-17 LAB
GLUCOSE SERPL-MCNC: 180 MG/DL (ref 65–140)
GLUCOSE SERPL-MCNC: 186 MG/DL (ref 65–140)

## 2025-02-17 PROCEDURE — 82948 REAGENT STRIP/BLOOD GLUCOSE: CPT

## 2025-02-17 PROCEDURE — 99183 HYPERBARIC OXYGEN THERAPY: CPT | Performed by: PHYSICIAN ASSISTANT

## 2025-02-17 PROCEDURE — G0277 HBOT, FULL BODY CHAMBER, 30M: HCPCS

## 2025-02-17 RX ORDER — PANTOPRAZOLE SODIUM 40 MG/1
40 TABLET, DELAYED RELEASE ORAL DAILY
Qty: 90 TABLET | Refills: 0 | Status: SHIPPED | OUTPATIENT
Start: 2025-02-17

## 2025-02-17 NOTE — PROGRESS NOTES
HBO Treatment Course Details       Treatment Notes: Patient tolerated HBOt well with no complaints. Wound care completed per orders.      Treatment Course Number: 8  Total Treatments Ordered:  40     Diagnosis:   1. Diabetic ulcer of left foot associated with type 2 diabetes mellitus, with muscle involvement without evidence of necrosis, unspecified part of foot (HCC)  Hyperbaric oxygen [facility]      2. Diabetic ulcer of toe of right foot associated with type 2 diabetes mellitus, with necrosis of bone (HCC)  Hyperbaric oxygen [facility]    Wound Procedure Treatment Diabetic Ulcer Right;Posterior Toe D1, great          HBO Treatment Details:  In-Patient Visit: no  Treatment Length:90 Minutes(Minutes)  Chamber #: Hard sided Monoplace Chamber    Pre-Treatment details:  Pre-treatment protocol Treatment Protocol: 2.0 INDY X 90 minutes w/ 100% oxygen, two 5 minute air breaks  Left ear clear?: yes  Right ear clear?: yes     Right ear intact?: yes        PE Tubes present, Left ear?: yes  PE Tubes present, Right ear?: yes  Left ear irrigated?: no  Right ear irrigated?: no  Left ear TEED scale: Grade 0  Right ear TEED scale: Grade 0   Pretreatment heart and lung assessment: Pretreatment heart and lung auscltation unremarkable. Patient cleared for HBOT     Treatment details:  INDY Rate: 2  Started Compression: 0825  Reached Compression: 0830  Total Compression Time: 5 (Minutes)  Total Holding Time: 100 (Minutes)  Started Decompression: 1010  Reached Surface: 1017  Total Decompression Time: 7 (Minutes)  Total Airbreaks:   (Minutes)  Total Time of Treatment: 112 (Minutes)  Symptoms Noted During Treatment: None (Minutes)    Post treatment details:  Left ear clear?: yes  Right ear clear?: yes  Left ears intact?: yes  Right ears intact?: yes        PE Tubes present, Left ear?: yes  PE Tubes present, Right ear?: yes        Left ear TEED scale: Grade 0  Right ear TEED scale: Grade 0  Post treatment heart and lung assessment: Post treatment  heart and lung auscltation unremarkable. Patient cleared for discharge. Tolerated treatment well.             Vital Signs:  \[facility] [place] [facility]\"" Glucose Reference Range: 100-350 mg/dl   Pre-Treatment Post-Treatment   Time vitals are taken: 0800 Time vitals are taken: 1020   Blood Pressure: 142/80 Blood Pressure: 122/70   Pulse: 56 Pulse: 76   Resp: 18 Resp: 16   Temp: 96.1 °F (35.6 °C) Temp: 97.4 °F (36.3 °C)   Pre-Inspection Glucose readin Post-Inspection Glucose readin       Allergies   Allergen Reactions    Lisinopril Rash and Lip Swelling     Patient Active Problem List    Diagnosis Date Noted    Gram-positive bacteremia 2025    Acute metabolic encephalopathy 2025    Bacteremia 2025    S/P placement of cardiac pacemaker 2024    Bradycardia 2024    Multiple open wounds of lower extremity 2024    Ulcer of right foot (HCC) 2024    SOB (shortness of breath) 2024    Nausea 2024    Elevated troponin 2024    History of DVT (deep vein thrombosis) 2024    Diabetic ulcer of toe of right foot associated with type 2 diabetes mellitus, with fat layer exposed (HCC) 2024    Plantar fasciitis, right 07/10/2024    Acute deep vein thrombosis (DVT) of left peroneal vein (HCC) 2024    Iron deficiency anemia 2024    Shortness of breath 2024    History of colon polyps 2024    Duodenal ulcer 2024    Multiple gastric ulcers 2023    Iron deficiency anemia due to chronic blood loss 2023    Melena 2023    Symptomatic anemia 2023    Frequent PVCs 2023    Chronic diastolic congestive heart failure (HCC) 2023    Acute kidney injury superimposed on stage 3b chronic kidney disease (HCC) 2023    Diabetic ulcer of right midfoot associated with diabetes mellitus due to underlying condition, limited to breakdown of skin (HCC) 06/15/2023    Hypertensive urgency 2023    Elevated troponin level not due  myocardial infarction 05/11/2023    Stable angina pectoris ([facility]) 03/07/2023    Chronic kidney disease-mineral and bone disorder 11/30/2022    Nonrheumatic aortic valve stenosis 11/11/2022    Gross hematuria 07/26/2022    Platelets decreased ([facility]) 07/22/2022    Acute kidney injury superimposed on chronic kidney disease  ([facility]) 02/16/2022    Actinic keratoses 01/14/2022    Type 2 diabetes mellitus with diabetic peripheral angiopathy without gangrene, with long-term current use of insulin ([facility]) 01/11/2022    Varicose veins of left lower extremity 06/25/2021    History of endovascular stent graft for abdominal aortic aneurysm (AAA) 06/25/2021    Bruit (arterial) 06/25/2021    PAD (peripheral artery disease) ([facility]) 06/25/2021    Type 2 diabetes mellitus with diabetic polyneuropathy, with long-term current use of insulin ([facility]) 06/10/2021    Mixed hyperlipidemia 05/11/2021    Primary hypertension 05/11/2021    Coronary artery disease involving native coronary artery of native heart without angina pectoris 05/11/2021    S/P angioplasty with stent 05/11/2021    Hx of CABG 05/11/2021    S/P AAA repair 05/11/2021    S/P prostatectomy 05/11/2021    H/O prostate cancer 05/11/2021     Orders Placed This Encounter   Procedures    Wound Procedure Treatment Diabetic Ulcer Right;Posterior Toe D1, great     This order was created via procedure documentation   Wound Procedure Treatment Diabetic Ulcer Right;Posterior Toe D1, great    Performed by: Nadege Pinzon RN  Authorized by: Nitza Sotelo DO    Associated wounds:   Wound 11/08/24 Diabetic Ulcer Toe D1, great Right;Posterior  Wound cleansed with:  NSS  Applied primary dressing:  Acticoat  Applied secondary dressing:  Gauze  Dressing secured with:  Ender and Tape  Comments:  Acticoat 3 applied to wound bed

## 2025-02-18 ENCOUNTER — OFFICE VISIT (OUTPATIENT)
Dept: FAMILY MEDICINE CLINIC | Facility: CLINIC | Age: 80
End: 2025-02-18
Payer: COMMERCIAL

## 2025-02-18 ENCOUNTER — OFFICE VISIT (OUTPATIENT)
Dept: WOUND CARE | Facility: HOSPITAL | Age: 80
End: 2025-02-18
Payer: COMMERCIAL

## 2025-02-18 VITALS
DIASTOLIC BLOOD PRESSURE: 64 MMHG | RESPIRATION RATE: 18 BRPM | HEART RATE: 62 BPM | TEMPERATURE: 96.1 F | SYSTOLIC BLOOD PRESSURE: 138 MMHG

## 2025-02-18 VITALS
RESPIRATION RATE: 16 BRPM | SYSTOLIC BLOOD PRESSURE: 110 MMHG | TEMPERATURE: 97.3 F | DIASTOLIC BLOOD PRESSURE: 70 MMHG | WEIGHT: 226 LBS | BODY MASS INDEX: 29 KG/M2 | HEIGHT: 74 IN | HEART RATE: 68 BPM

## 2025-02-18 DIAGNOSIS — E78.2 MIXED HYPERLIPIDEMIA: ICD-10-CM

## 2025-02-18 DIAGNOSIS — Z79.4 TYPE 2 DIABETES MELLITUS WITH DIABETIC POLYNEUROPATHY, WITH LONG-TERM CURRENT USE OF INSULIN (HCC): ICD-10-CM

## 2025-02-18 DIAGNOSIS — Z85.46 H/O PROSTATE CANCER: ICD-10-CM

## 2025-02-18 DIAGNOSIS — I10 PRIMARY HYPERTENSION: Primary | ICD-10-CM

## 2025-02-18 DIAGNOSIS — Z79.4 TYPE 2 DIABETES MELLITUS WITH DIABETIC PERIPHERAL ANGIOPATHY WITHOUT GANGRENE, WITH LONG-TERM CURRENT USE OF INSULIN (HCC): ICD-10-CM

## 2025-02-18 DIAGNOSIS — Z00.00 MEDICARE ANNUAL WELLNESS VISIT, SUBSEQUENT: ICD-10-CM

## 2025-02-18 DIAGNOSIS — E11.42 TYPE 2 DIABETES MELLITUS WITH DIABETIC POLYNEUROPATHY, WITH LONG-TERM CURRENT USE OF INSULIN (HCC): ICD-10-CM

## 2025-02-18 DIAGNOSIS — L97.514 DIABETIC ULCER OF TOE OF RIGHT FOOT ASSOCIATED WITH TYPE 2 DIABETES MELLITUS, WITH NECROSIS OF BONE (HCC): ICD-10-CM

## 2025-02-18 DIAGNOSIS — E11.621 DIABETIC ULCER OF TOE OF RIGHT FOOT ASSOCIATED WITH TYPE 2 DIABETES MELLITUS, WITH NECROSIS OF BONE (HCC): ICD-10-CM

## 2025-02-18 DIAGNOSIS — E11.51 TYPE 2 DIABETES MELLITUS WITH DIABETIC PERIPHERAL ANGIOPATHY WITHOUT GANGRENE, WITH LONG-TERM CURRENT USE OF INSULIN (HCC): ICD-10-CM

## 2025-02-18 PROCEDURE — G2211 COMPLEX E/M VISIT ADD ON: HCPCS | Performed by: FAMILY MEDICINE

## 2025-02-18 PROCEDURE — 99214 OFFICE O/P EST MOD 30 MIN: CPT | Performed by: FAMILY MEDICINE

## 2025-02-18 PROCEDURE — G0439 PPPS, SUBSEQ VISIT: HCPCS | Performed by: FAMILY MEDICINE

## 2025-02-18 PROCEDURE — 99183 HYPERBARIC OXYGEN THERAPY: CPT | Performed by: FAMILY MEDICINE

## 2025-02-18 PROCEDURE — 82948 REAGENT STRIP/BLOOD GLUCOSE: CPT | Performed by: FAMILY MEDICINE

## 2025-02-18 PROCEDURE — G0277 HBOT, FULL BODY CHAMBER, 30M: HCPCS | Performed by: FAMILY MEDICINE

## 2025-02-18 RX ORDER — INSULIN GLARGINE 100 [IU]/ML
15 INJECTION, SOLUTION SUBCUTANEOUS
Qty: 13.5 ML | Refills: 3 | Status: SHIPPED | OUTPATIENT
Start: 2025-02-18 | End: 2026-02-18

## 2025-02-18 NOTE — ASSESSMENT & PLAN NOTE
stable  Orders:  •  Albumin / creatinine urine ratio; Future  •  Comprehensive metabolic panel; Future  •  Hemoglobin A1C; Future  •  Lipid Panel with Direct LDL reflex; Future  •  CBC and differential; Future

## 2025-02-18 NOTE — PATIENT INSTRUCTIONS
Medicare Preventive Visit Patient Instructions  Thank you for completing your Welcome to Medicare Visit or Medicare Annual Wellness Visit today. Your next wellness visit will be due in one year (2/19/2026).  The screening/preventive services that you may require over the next 5-10 years are detailed below. Some tests may not apply to you based off risk factors and/or age. Screening tests ordered at today's visit but not completed yet may show as past due. Also, please note that scanned in results may not display below.  Preventive Screenings:  Service Recommendations Previous Testing/Comments   Colorectal Cancer Screening  Colonoscopy    Fecal Occult Blood Test (FOBT)/Fecal Immunochemical Test (FIT)  Fecal DNA/Cologuard Test  Flexible Sigmoidoscopy Age: 45-75 years old   Colonoscopy: every 10 years (May be performed more frequently if at higher risk)  OR  FOBT/FIT: every 1 year  OR  Cologuard: every 3 years  OR  Sigmoidoscopy: every 5 years  Screening may be recommended earlier than age 45 if at higher risk for colorectal cancer. Also, an individualized decision between you and your healthcare provider will decide whether screening between the ages of 76-85 would be appropriate. Colonoscopy: 02/14/2024  FOBT/FIT: Not on file  Cologuard: Not on file  Sigmoidoscopy: Not on file          Prostate Cancer Screening Individualized decision between patient and health care provider in men between ages of 55-69   Medicare will cover every 12 months beginning on the day after your 50th birthday PSA: <0.01 ng/mL     History Prostate Cancer  Screening Not Indicated     Hepatitis C Screening Once for adults born between 1945 and 1965  More frequently in patients at high risk for Hepatitis C Hep C Antibody: 01/11/2022    Screening Current   Diabetes Screening 1-2 times per year if you're at risk for diabetes or have pre-diabetes Fasting glucose: 132 mg/dL (2/15/2025)  A1C: 6.4 % (2/15/2025)  Screening Not Indicated  History  Diabetes   Cholesterol Screening Once every 5 years if you don't have a lipid disorder. May order more often based on risk factors. Lipid panel: 12/17/2024  Screening Not Indicated  History Lipid Disorder      Other Preventive Screenings Covered by Medicare:  Abdominal Aortic Aneurysm (AAA) Screening: covered once if your at risk. You're considered to be at risk if you have a family history of AAA or a male between the age of 65-75 who smoking at least 100 cigarettes in your lifetime.  Lung Cancer Screening: covers low dose CT scan once per year if you meet all of the following conditions: (1) Age 55-77; (2) No signs or symptoms of lung cancer; (3) Current smoker or have quit smoking within the last 15 years; (4) You have a tobacco smoking history of at least 20 pack years (packs per day x number of years you smoked); (5) You get a written order from a healthcare provider.  Glaucoma Screening: covered annually if you're considered high risk: (1) You have diabetes OR (2) Family history of glaucoma OR (3)  aged 50 and older OR (4)  American aged 65 and older  Osteoporosis Screening: covered every 2 years if you meet one of the following conditions: (1) Have a vertebral abnormality; (2) On glucocorticoid therapy for more than 3 months; (3) Have primary hyperparathyroidism; (4) On osteoporosis medications and need to assess response to drug therapy.  HIV Screening: covered annually if you're between the age of 15-65. Also covered annually if you are younger than 15 and older than 65 with risk factors for HIV infection. For pregnant patients, it is covered up to 3 times per pregnancy.    Immunizations:  Immunization Recommendations   Influenza Vaccine Annual influenza vaccination during flu season is recommended for all persons aged >= 6 months who do not have contraindications   Pneumococcal Vaccine   * Pneumococcal conjugate vaccine = PCV13 (Prevnar 13), PCV15 (Vaxneuvance), PCV20 (Prevnar  20)  * Pneumococcal polysaccharide vaccine = PPSV23 (Pneumovax) Adults 19-65 yo with certain risk factors or if 65+ yo  If never received any pneumonia vaccine: recommend Prevnar 20 (PCV20)  Give PCV20 if previously received 1 dose of PCV13 or PPSV23   Hepatitis B Vaccine 3 dose series if at intermediate or high risk (ex: diabetes, end stage renal disease, liver disease)   Respiratory syncytial virus (RSV) Vaccine - COVERED BY MEDICARE PART D  * RSVPreF3 (Arexvy) CDC recommends that adults 60 years of age and older may receive a single dose of RSV vaccine using shared clinical decision-making (SCDM)   Tetanus (Td) Vaccine - COST NOT COVERED BY MEDICARE PART B Following completion of primary series, a booster dose should be given every 10 years to maintain immunity against tetanus. Td may also be given as tetanus wound prophylaxis.   Tdap Vaccine - COST NOT COVERED BY MEDICARE PART B Recommended at least once for all adults. For pregnant patients, recommended with each pregnancy.   Shingles Vaccine (Shingrix) - COST NOT COVERED BY MEDICARE PART B  2 shot series recommended in those 19 years and older who have or will have weakened immune systems or those 50 years and older     Health Maintenance Due:      Topic Date Due   • Hepatitis C Screening  Completed     Immunizations Due:      Topic Date Due   • Influenza Vaccine (1) 09/01/2024   • COVID-19 Vaccine (5 - 2024-25 season) 09/01/2024     Advance Directives   What are advance directives?  Advance directives are legal documents that state your wishes and plans for medical care. These plans are made ahead of time in case you lose your ability to make decisions for yourself. Advance directives can apply to any medical decision, such as the treatments you want, and if you want to donate organs.   What are the types of advance directives?  There are many types of advance directives, and each state has rules about how to use them. You may choose a combination of any of  the following:  Living will:  This is a written record of the treatment you want. You can also choose which treatments you do not want, which to limit, and which to stop at a certain time. This includes surgery, medicine, IV fluid, and tube feedings.   Durable power of  for healthcare (DPAHC):  This is a written record that states who you want to make healthcare choices for you when you are unable to make them for yourself. This person, called a proxy, is usually a family member or a friend. You may choose more than 1 proxy.  Do not resuscitate (DNR) order:  A DNR order is used in case your heart stops beating or you stop breathing. It is a request not to have certain forms of treatment, such as CPR. A DNR order may be included in other types of advance directives.  Medical directive:  This covers the care that you want if you are in a coma, near death, or unable to make decisions for yourself. You can list the treatments you want for each condition. Treatment may include pain medicine, surgery, blood transfusions, dialysis, IV or tube feedings, and a ventilator (breathing machine).  Values history:  This document has questions about your views, beliefs, and how you feel and think about life. This information can help others choose the care that you would choose.  Why are advance directives important?  An advance directive helps you control your care. Although spoken wishes may be used, it is better to have your wishes written down. Spoken wishes can be misunderstood, or not followed. Treatments may be given even if you do not want them. An advance directive may make it easier for your family to make difficult choices about your care.   Weight Management   Why it is important to manage your weight:  Being overweight increases your risk of health conditions such as heart disease, high blood pressure, type 2 diabetes, and certain types of cancer. It can also increase your risk for osteoarthritis, sleep apnea,  and other respiratory problems. Aim for a slow, steady weight loss. Even a small amount of weight loss can lower your risk of health problems.  How to lose weight safely:  A safe and healthy way to lose weight is to eat fewer calories and get regular exercise. You can lose up about 1 pound a week by decreasing the number of calories you eat by 500 calories each day.   Healthy meal plan for weight management:  A healthy meal plan includes a variety of foods, contains fewer calories, and helps you stay healthy. A healthy meal plan includes the following:  Eat whole-grain foods more often.  A healthy meal plan should contain fiber. Fiber is the part of grains, fruits, and vegetables that is not broken down by your body. Whole-grain foods are healthy and provide extra fiber in your diet. Some examples of whole-grain foods are whole-wheat breads and pastas, oatmeal, brown rice, and bulgur.  Eat a variety of vegetables every day.  Include dark, leafy greens such as spinach, kale, deloris greens, and mustard greens. Eat yellow and orange vegetables such as carrots, sweet potatoes, and winter squash.   Eat a variety of fruits every day.  Choose fresh or canned fruit (canned in its own juice or light syrup) instead of juice. Fruit juice has very little or no fiber.  Eat low-fat dairy foods.  Drink fat-free (skim) milk or 1% milk. Eat fat-free yogurt and low-fat cottage cheese. Try low-fat cheeses such as mozzarella and other reduced-fat cheeses.  Choose meat and other protein foods that are low in fat.  Choose beans or other legumes such as split peas or lentils. Choose fish, skinless poultry (chicken or turkey), or lean cuts of red meat (beef or pork). Before you cook meat or poultry, cut off any visible fat.   Use less fat and oil.  Try baking foods instead of frying them. Add less fat, such as margarine, sour cream, regular salad dressing and mayonnaise to foods. Eat fewer high-fat foods. Some examples of high-fat foods  "include french fries, doughnuts, ice cream, and cakes.  Eat fewer sweets.  Limit foods and drinks that are high in sugar. This includes candy, cookies, regular soda, and sweetened drinks.  Exercise:  Exercise at least 30 minutes per day on most days of the week. Some examples of exercise include walking, biking, dancing, and swimming. You can also fit in more physical activity by taking the stairs instead of the elevator or parking farther away from stores. Ask your healthcare provider about the best exercise plan for you.   Alcohol Use and Your Health    Drinking too much can harm your health.  Excessive alcohol use leads to about 88,000 death in the [place] each year, and shortens the life of those who diet by almost 30 years.  Further, excessive drinking cost the economy $249 billion in 2010.  Most excessive drinkers are not alcohol dependent.    Excessive alcohol use has immediate effects that increase the risk of many harmful health conditions.  These are most often the result of binge drinking.  Over time, excessive alcohol use can lead to the development of chronic diseases and other series health problems.    What is considered a \"drink\"?        Excessive alcohol use includes:  Binge Drinking: For women, 4 or more drinks consumed on one occasion. For men, 5 or more drinks consumed on one occasion.  Heavy Drinking: For women, 8 or more drinks per week. For men, 15 or more drinks per week  Any alcohol used by pregnant women  Any alcohol used by those under the age of 21 years    If you choose to drink, do so in moderation:  Do not drink at all if you are under the age of 21, or if you are or may be pregnant, or have health problems that could be made worse by drinking.  For women, up to 1 drink per day  For men, up to 2 drinks a day    No one should begin drinking or drink more frequently based on potential health benefits    Short-Term Health Risks:  Injuries: motor vehicle crashes, falls, drownings, " burns  Violence: homicide, suicide, sexual assault, intimate partner violence  Alcohol poisoning  Reproductive health: risky sexual behaviors, unintended prengnacy, sexually transmitted diseases, miscarriage, stillbirth, fetal alcohol syndrome    Long-Term Health Risks:  Chronic diseases: high blood pressure, heart disease, stroke, liver disease, digestive problems  Cancers: breast, mouth and throat, liver, colon  Learning and memory problems: dementia, poor school performance  Mental health: depression, anxiety, insomnia  Social problems: lost productivity, family problems, unemployment  Alcohol dependence    For support and more information:  Substance Abuse and Mental Health Services Administration  [address]  [place] [address]  Web Address: http://www.[facility] Hospital[facility].gov    Alcoholics Anonymous        Web Address: http://www.aa.org    https://www.cdc.gov/alcohol/fact-sheets/alcohol-use.htm  Narcotic (Opioid) Safety    Use narcotics safely:  Take prescribed narcotics exactly as directed  Do not give narcotics to others or take narcotics that belong to someone else  Do not mix narcotics without medicines or alcohol  Do not drive or operate heavy machinery after you take the narcotic  Monitor for side effects and notify your healthcare provider if you experienced side effects such as nausea, sleepiness, itching, or trouble thinking clearly.    Manage constipation:    Constipation is the most common side effect of narcotic medicine. Constipation is when you have hard, dry bowel movements, or you go longer than usual between bowel movements. Tell your healthcare provider about all changes in your bowel movements while you are taking narcotics. He or she may recommend laxative medicine to help you have a bowel movement. He or she may also change the kind of narcotic you are taking, or change when you take it. The following are more ways you can prevent or relieve constipation:    Drink liquids as directed.  You may  need to drink extra liquids to help soften and move your bowels. Ask how much liquid to drink each day and which liquids are best for you.  Eat high-fiber foods.  This may help decrease constipation by adding bulk to your bowel movements. High-fiber foods include fruits, vegetables, whole-grain breads and cereals, and beans. Your healthcare provider or dietitian can help you create a high-fiber meal plan. Your provider may also recommend a fiber supplement if you cannot get enough fiber from food.  Exercise regularly.  Regular physical activity can help stimulate your intestines. Walking is a good exercise to prevent or relieve constipation. Ask which exercises are best for you.  Schedule a time each day to have a bowel movement.  This may help train your body to have regular bowel movements. Bend forward while you are on the toilet to help move the bowel movement out. Sit on the toilet for at least 10 minutes, even if you do not have a bowel movement.    Store narcotics safely:   Store narcotics where others cannot easily get them.  Keep them in a locked cabinet or secure area. Do not  keep them in a purse or other bag you carry with you. A person may be looking for something else and find the narcotics.  Make sure narcotics are stored out of the reach of children.  A child can easily overdose on narcotics. Narcotics may look like candy to a small child.    The best way to dispose of narcotics:      The laws vary by country and area. In the [place], the best way is to return the narcotics through a take-back program. This program is offered by the [place] Drug Enforcement Agency (LELA). The following are options for using the program:  Take the narcotics to a LELA collection site.  The site is often a law enforcement center. Call your local law enforcement center for scheduled take-back days in your area. You will be given information on where to go if the collection site is in a different location.  Take the  narcotics to an approved pharmacy or hospital.  A pharmacy or hospital may be set up as a collection site. You will need to ask if it is a LELA collection site if you were not directed there. A pharmacy or doctor's office may not be able to take back narcotics unless it is a LELA site.  Use a mail-back system.  This means you are given containers to put the narcotics into. You will then mail them in the containers.  Use a take-back drop box.  This is a place to leave the narcotics at any time. People and animals will not be able to get into the box. Your local law enforcement agency can tell you where to find a drop box in your area.    Other ways to manage pain:   Ask your healthcare provider about non-narcotic medicines to control pain.  Nonprescription medicines include NSAIDs (such as ibuprofen) and acetaminophen. Prescription medicines include muscle relaxers, antidepressants, and steroids.  Pain may be managed without any medicines.  Some ways to relieve pain include massage, aromatherapy, or meditation. Physical or occupational therapy may also help.    For more information:   Drug Enforcement Administration  [address] [place] [address]  Phone: 1- 873 - [phone]  Web Address: https://www.deadiversion.[facility].gov/drug_disposal/    US Food and Drug Administration  [address] [place] [address]  Phone: 1- 436 - 725-5869  Web Address: http://www.fda.gov     © Copyright [employer] 2018 Information is for End User's use only and may not be sold, redistributed or otherwise used for commercial purposes. All illustrations and images included in CareNotes® are the copyrighted property of A.D.A.M., Inc. or [employer]

## 2025-02-18 NOTE — PROGRESS NOTES
HBO Treatment Course Details       Treatment Notes: Patient tolerated HBOT well with no complaints.      Treatment Course Number: 9  Total Treatments Ordered:  40     Diagnosis:   1. Diabetic ulcer of toe of right foot associated with type 2 diabetes mellitus, with necrosis of bone (HCC)  Hyperbaric oxygen thearpy          HBO Treatment Details:  In-Patient Visit: no  Treatment Length:90 Minutes(Minutes)  Chamber #: Hard sided Monoplace Chamber    Pre-Treatment details:  Pre-treatment protocol Treatment Protocol: 2.0 INDY X 90 minutes w/ 100% oxygen, two 5 minute air breaks  Left ear clear?: yes  Right ear clear?: yes  Left ear intact?: yes  Right ear intact?: yes        PE Tubes present, Left ear?: yes  PE Tubes present, Right ear?: yes        Left ear TEED scale: Grade 0  Right ear TEED scale: Grade 0   Pretreatment heart and lung assessment: Pretreatment heart and lung auscltation unremarkable. Patient cleared for HBOT     Treatment details:  INDY Rate: 2  Started Compression: 0812  Reached Compression: 0818  Total Compression Time: 6 (Minutes)  Total Holding Time: 100 (Minutes)  Started Decompression: 0958  Reached Surface: 1004  Total Decompression Time: 6 (Minutes)  Total Airbreaks:   (Minutes)  Total Time of Treatment: 112 (Minutes)  Symptoms Noted During Treatment: None (Minutes)    Post treatment details:  Left ear clear?: yes  Right ear clear?: yes              PE Tubes present, Left ear?: yes  PE Tubes present, Right ear?: yes        Left ear TEED scale: Grade 0  Right ear TEED scale: Grade 0  Post treatment heart and lung assessment: Post treatment heart and lung auscltation unremarkable. Patient cleared for discharge. Tolerated treatment well.             Vital Signs:  HBO Glucose Reference Range: 100-350 mg/dl   Pre-Treatment Post-Treatment   Time vitals are taken: 0800 Time vitals are taken: 1015   Blood Pressure: 138/64 Blood Pressure: 122/80   Pulse: 62 Pulse: 64   Resp: 18 Resp: 18   Temp: 96.1 °F  (35.6 °C) Temp: 96.7 °F (35.9 °C)   Pre-Inspection Glucose readin Post-Inspection Glucose readin       Allergies   Allergen Reactions   • Lisinopril Rash and Lip Swelling     Patient Active Problem List    Diagnosis Date Noted   • Gram-positive bacteremia 2025   • Acute metabolic encephalopathy 2025   • Bacteremia 2025   • S/P placement of cardiac pacemaker 2024   • Bradycardia 2024   • Multiple open wounds of lower extremity 2024   • Ulcer of right foot (HCC) 2024   • SOB (shortness of breath) 2024   • Nausea 2024   • Elevated troponin 2024   • History of DVT (deep vein thrombosis) 2024   • Diabetic ulcer of toe of right foot associated with type 2 diabetes mellitus, with fat layer exposed ([facility]) 2024   • Plantar fasciitis, right 07/10/2024   • Acute deep vein thrombosis (DVT) of left peroneal vein ([facility]) 2024   • Iron deficiency anemia 2024   • Shortness of breath 2024   • History of colon polyps 2024   • Duodenal ulcer 2024   • Multiple gastric ulcers 2023   • Iron deficiency anemia due to chronic blood loss 2023   • Melena 2023   • Symptomatic anemia 2023   • Frequent PVCs 2023   • Chronic diastolic congestive heart failure ([facility]) 2023   • Acute kidney injury superimposed on stage 3b chronic kidney disease ([facility]) 2023   • Diabetic ulcer of right midfoot associated with diabetes mellitus due to underlying condition, limited to breakdown of skin ([facility]) 06/15/2023   • Hypertensive urgency 2023   • Elevated troponin level not due myocardial infarction 2023   • Stable angina pectoris ([facility]) 2023   • Chronic kidney disease-mineral and bone disorder 2022   • Nonrheumatic aortic valve stenosis 2022   • Gross hematuria 2022   • Platelets decreased ([facility]) 2022   • Acute kidney injury superimposed on chronic kidney disease  ([facility])  02/16/2022   • Actinic keratoses 01/14/2022   • Type 2 diabetes mellitus with diabetic peripheral angiopathy without gangrene, with long-term current use of insulin ([facility]) 01/11/2022   • Varicose veins of left lower extremity 06/25/2021   • History of endovascular stent graft for abdominal aortic aneurysm (AAA) 06/25/2021   • Bruit (arterial) 06/25/2021   • PAD (peripheral artery disease) ([facility]) 06/25/2021   • Type 2 diabetes mellitus with diabetic polyneuropathy, with long-term current use of insulin ([facility]) 06/10/2021   • Mixed hyperlipidemia 05/11/2021   • Primary hypertension 05/11/2021   • Coronary artery disease involving native coronary artery of native heart without angina pectoris 05/11/2021   • S/P angioplasty with stent 05/11/2021   • Hx of CABG 05/11/2021   • S/P AAA repair 05/11/2021   • S/P prostatectomy 05/11/2021   • H/O prostate cancer 05/11/2021     No orders of the defined types were placed in this encounter.

## 2025-02-18 NOTE — PROGRESS NOTES
Name: Thomas Horne      : 1945      MRN: [id number]  Encounter Provider: Frank Lombardi, DO  Encounter Date: 2025   Encounter department: [facility]    Assessment & Plan  Primary hypertension  stable  Orders:  •  Albumin / creatinine urine ratio; Future  •  Comprehensive metabolic panel; Future  •  Hemoglobin A1C; Future  •  Lipid Panel with Direct LDL reflex; Future  •  CBC and differential; Future    Type 2 diabetes mellitus with diabetic polyneuropathy, with long-term current use of insulin ([facility])    Lab Results   Component Value Date    HGBA1C 6.4 (H) 02/15/2025     Orders:  •  Albumin / creatinine urine ratio; Future  •  Comprehensive metabolic panel; Future  •  Hemoglobin A1C; Future  •  Lipid Panel with Direct LDL reflex; Future  •  CBC and differential; Future    Mixed hyperlipidemia    Orders:  •  Albumin / creatinine urine ratio; Future  •  Comprehensive metabolic panel; Future  •  Hemoglobin A1C; Future  •  Lipid Panel with Direct LDL reflex; Future  •  CBC and differential; Future    Medicare annual wellness visit, subsequent         Type 2 diabetes mellitus with diabetic peripheral angiopathy without gangrene, with long-term current use of insulin ([facility])    Lab Results   Component Value Date    HGBA1C 6.4 (H) 02/15/2025     Orders:  •  insulin glargine (LANTUS) 100 units/mL subcutaneous injection; Inject 15 Units under the skin daily at bedtime    H/O prostate cancer  Pt states he is seeing urology and they follow PSA - advised if they stop and he needs it I can do it          Preventive health issues were discussed with patient, and age appropriate screening tests were ordered as noted in patient's After Visit Summary. Personalized health advice and appropriate referrals for health education or preventive services given if needed, as noted in patient's After Visit Summary.    History of Present Illness     Pt is sched for a follow up  Pt also due for an AWV       Patient Care  Team:  Frank Lombardi, DO as PCP - General (Family Medicine)  Naun Bell MD as Consulting Physician (Cardiology)  Prashanth Nelson MD as Consulting Physician (Ophthalmology)  Sundar Arango DPM as Consulting Physician (Podiatry)  Rajan Jack MD as Consulting Physician (Vascular Surgery)  Jose Fischer MD (Nephrology)  Karma Melissa PA-C as Physician Assistant (Hematology and Oncology)    Review of Systems   Constitutional:  Negative for activity change, appetite change, chills, diaphoresis, fatigue, fever and unexpected weight change.   HENT:  Negative for congestion, dental problem, ear pain, mouth sores, sinus pressure, sinus pain, sore throat and trouble swallowing.    Eyes:  Negative for photophobia, discharge and itching.   Respiratory:  Negative for apnea, chest tightness and shortness of breath.    Cardiovascular:  Negative for chest pain, palpitations and leg swelling.   Gastrointestinal:  Negative for abdominal distention, abdominal pain, blood in stool, nausea and vomiting.   Endocrine: Negative for cold intolerance, heat intolerance, polydipsia, polyphagia and polyuria.   Genitourinary:  Negative for difficulty urinating.   Musculoskeletal:  Negative for arthralgias.   Skin:  Negative for color change and wound.   Neurological:  Negative for dizziness, syncope, speech difficulty and headaches.   Hematological:  Negative for adenopathy.   Psychiatric/Behavioral:  Negative for agitation and behavioral problems.      Medical History Reviewed by provider this encounter:       Annual Wellness Visit Questionnaire   Thomas is here for his Subsequent Wellness visit. Last Medicare Wellness visit information reviewed, patient interviewed, no change since last AWV.     Health Risk Assessment:   Patient rates overall health as fair. Patient feels that their physical health rating is slightly better. Patient is satisfied with their life. Eyesight was rated as same. Hearing was rated as same. Patient  feels that their emotional and mental health rating is same. Patients states they are never, rarely angry. Patient states they are sometimes unusually tired/fatigued. Pain experienced in the last 7 days has been none. Patient states that he has experienced no weight loss or gain in last 6 months.     Depression Screening:   PHQ-2 Score: 0      Fall Risk Screening:   In the past year, patient has experienced: no history of falling in past year      Home Safety:  Patient has trouble with stairs inside or outside of their home. Patient has working smoke alarms and has working carbon monoxide detector.     Medications:   Patient is currently taking over-the-counter supplements. OTC medications include: see medication list. Patient is not able to manage medications.     Activities of Daily Living (ADLs)/Instrumental Activities of Daily Living (IADLs):   Walk and transfer into and out of bed and chair?: Yes  Dress and groom yourself?: Yes    Bathe or shower yourself?: Yes    Feed yourself? Yes  Do your laundry/housekeeping?: Yes  Manage your money, pay your bills and track your expenses?: Yes  Make your own meals?: Yes    Do your own shopping?: Yes    Advance Care Planning:   Living will: Yes    Advanced directive: Yes      Cognitive Screening:   Provider or family/friend/caregiver concerned regarding cognition?: No    PREVENTIVE SCREENINGS      Cardiovascular Screening:    General: Screening Not Indicated, History Lipid Disorder and Risks and Benefits Discussed    Due for: Lipid Panel      Diabetes Screening:     General: Screening Not Indicated, History Diabetes and Risks and Benefits Discussed    Due for: Blood Glucose      Colorectal Cancer Screening:     General: Screening Current      Prostate Cancer Screening:    General: History Prostate Cancer, Screening Not Indicated, Risks and Benefits Discussed and Screening Current      Osteoporosis Screening:    General: Patient Declines      Abdominal Aortic Aneurysm (AAA)  Screening:    Risk factors include: tobacco use        General: Screening Not Indicated, History AAA and Risks and Benefits Discussed      Lung Cancer Screening:     General: Screening Not Indicated      Hepatitis C Screening:    General: Screening Current    Screening, Brief Intervention, and Referral to Treatment (SBIRT)     Screening      AUDIT-C Screenin) How often did you have a drink containing alcohol in the past year? 4 or more times a week  2) How many drinks did you have on a typical day when you were drinking in the past year? 3 to 4  3) How often did you have 6 or more drinks on one occasion in the past year? less than monthly    AUDIT-C Score: 5  Interpretation: Score 4-12 (male): POSITIVE screen for alcohol misuse    AUDIT Screenin) How often during the last year have you found that you were not able to stop drinking once you had started? 0 - never  5) How often during the last year have you failed to do what was normally expected from you because of drinking? 0 - never  6) How often during the last year have you needed a first drink in the morning to get yourself going after a heavy drinking session? 0 - never  7) How often during the last year have you had a feeling of guilt or remorse after drinking? 0 - never  8) How often during the last year have you been unable to remember what happened the night before because you had been drinking? 0 - never  9) Have you or someone else been injured as a result of your drinking? 0 - no  10) Has a relative or friend or a doctor or another health worker been concerned about your drinking or suggested you cut down? 0 - no    AUDIT Score: 5  Interpretation: Low risk alcohol consumption    Brief Intervention  Alcohol & drug use screenings were reviewed. No concerns regarding substance use disorder identified.     Review of Current Opioid Use    Opioid Risk Tool (ORT) Interpretation: Complete Opioid Risk Tool (ORT)    Social Drivers of Health     Financial  "Resource Strain: Low Risk  (10/31/2023)    Overall Financial Resource Strain (CARDIA)    • Difficulty of Paying Living Expenses: Not hard at all   Food Insecurity: No Food Insecurity (2/18/2025)    Hunger Vital Sign    • Worried About Running Out of Food in the Last Year: Never true    • Ran Out of Food in the Last Year: Never true   Transportation Needs: No Transportation Needs (2/18/2025)    PRAPARE - Transportation    • Lack of Transportation (Medical): No    • Lack of Transportation (Non-Medical): No   Housing Stability: Low Risk  (2/18/2025)    Housing Stability Vital Sign    • Unable to Pay for Housing in the Last Year: No    • Number of Times Moved in the Last Year: 1    • Homeless in the Last Year: No   Utilities: Not At Risk (2/18/2025)    [facility] Utilities    • Threatened with loss of utilities: No     No results found.    Objective   /70   Pulse 68   Temp (!) 97.3 °F (36.3 °C)   Resp 16   Ht 6' 2\" (1.88 m)   Wt 103 kg (226 lb)   BMI 29.02 kg/m²     Physical Exam  Vitals and nursing note reviewed.   Constitutional:       General: He is not in acute distress.     Appearance: He is well-developed. He is not diaphoretic.   HENT:      Head: Normocephalic and atraumatic.      Right Ear: External ear normal.      Left Ear: External ear normal.      Nose: Nose normal.      Mouth/Throat:      Pharynx: No oropharyngeal exudate.   Eyes:      General: No scleral icterus.        Right eye: No discharge.         Left eye: No discharge.      Pupils: Pupils are equal, round, and reactive to light.   Neck:      Thyroid: No thyromegaly.   Cardiovascular:      Rate and Rhythm: Normal rate.      Heart sounds: Normal heart sounds. No murmur heard.  Pulmonary:      Effort: Pulmonary effort is normal. No respiratory distress.      Breath sounds: Normal breath sounds. No wheezing.   Abdominal:      General: Bowel sounds are normal. There is no distension.      Palpations: Abdomen is soft. There is no mass.      " Tenderness: There is no abdominal tenderness. There is no guarding or rebound.   Musculoskeletal:         General: Normal range of motion.   Skin:     General: Skin is warm and dry.      Findings: No erythema or rash.   Neurological:      Mental Status: He is alert.      Coordination: Coordination normal.      Deep Tendon Reflexes: Reflexes normal.   Psychiatric:         Behavior: Behavior normal.

## 2025-02-18 NOTE — ASSESSMENT & PLAN NOTE
Lab Results   Component Value Date    HGBA1C 6.4 (H) 02/15/2025     Orders:  •  insulin glargine (LANTUS) 100 units/mL subcutaneous injection; Inject 15 Units under the skin daily at bedtime

## 2025-02-18 NOTE — ASSESSMENT & PLAN NOTE
Pt states he is seeing urology and they follow PSA - advised if they stop and he needs it I can do it

## 2025-02-18 NOTE — ASSESSMENT & PLAN NOTE
Lab Results   Component Value Date    HGBA1C 6.4 (H) 02/15/2025     Orders:  •  Albumin / creatinine urine ratio; Future  •  Comprehensive metabolic panel; Future  •  Hemoglobin A1C; Future  •  Lipid Panel with Direct LDL reflex; Future  •  CBC and differential; Future

## 2025-02-18 NOTE — ASSESSMENT & PLAN NOTE
Orders:  •  Albumin / creatinine urine ratio; Future  •  Comprehensive metabolic panel; Future  •  Hemoglobin A1C; Future  •  Lipid Panel with Direct LDL reflex; Future  •  CBC and differential; Future   Per mom pt has a bug bite on her calf, and pt's dad thinks it might be a spider bite. States its hard and red and warm to the touch. Mom looking for rec's.  Please advise

## 2025-02-19 LAB
GLUCOSE SERPL-MCNC: 168 MG/DL (ref 65–140)
GLUCOSE SERPL-MCNC: 181 MG/DL (ref 65–140)

## 2025-02-19 RX ORDER — ATORVASTATIN CALCIUM 40 MG/1
40 TABLET, FILM COATED ORAL
Qty: 90 TABLET | Refills: 1 | Status: SHIPPED | OUTPATIENT
Start: 2025-02-19

## 2025-02-20 ENCOUNTER — OFFICE VISIT (OUTPATIENT)
Age: 80
End: 2025-02-20
Payer: COMMERCIAL

## 2025-02-20 ENCOUNTER — OFFICE VISIT (OUTPATIENT)
Dept: WOUND CARE | Facility: HOSPITAL | Age: 80
End: 2025-02-20
Payer: COMMERCIAL

## 2025-02-20 VITALS
BODY MASS INDEX: 28.62 KG/M2 | DIASTOLIC BLOOD PRESSURE: 64 MMHG | WEIGHT: 223 LBS | HEART RATE: 68 BPM | HEIGHT: 74 IN | SYSTOLIC BLOOD PRESSURE: 146 MMHG

## 2025-02-20 DIAGNOSIS — E11.621 DIABETIC ULCER OF LEFT FOOT ASSOCIATED WITH TYPE 2 DIABETES MELLITUS, WITH MUSCLE INVOLVEMENT WITHOUT EVIDENCE OF NECROSIS, UNSPECIFIED PART OF FOOT (HCC): ICD-10-CM

## 2025-02-20 DIAGNOSIS — R11.0 NAUSEA: Primary | ICD-10-CM

## 2025-02-20 DIAGNOSIS — L97.525 DIABETIC ULCER OF LEFT FOOT ASSOCIATED WITH TYPE 2 DIABETES MELLITUS, WITH MUSCLE INVOLVEMENT WITHOUT EVIDENCE OF NECROSIS, UNSPECIFIED PART OF FOOT (HCC): ICD-10-CM

## 2025-02-20 DIAGNOSIS — K59.00 CONSTIPATION, UNSPECIFIED CONSTIPATION TYPE: ICD-10-CM

## 2025-02-20 LAB
GLUCOSE SERPL-MCNC: 128 MG/DL (ref 65–140)
GLUCOSE SERPL-MCNC: 160 MG/DL (ref 65–140)

## 2025-02-20 PROCEDURE — 82948 REAGENT STRIP/BLOOD GLUCOSE: CPT | Performed by: FAMILY MEDICINE

## 2025-02-20 PROCEDURE — 99214 OFFICE O/P EST MOD 30 MIN: CPT | Performed by: INTERNAL MEDICINE

## 2025-02-20 PROCEDURE — 99183 HYPERBARIC OXYGEN THERAPY: CPT | Performed by: PHYSICIAN ASSISTANT

## 2025-02-20 PROCEDURE — G0277 HBOT, FULL BODY CHAMBER, 30M: HCPCS | Performed by: FAMILY MEDICINE

## 2025-02-20 RX ORDER — ONDANSETRON 4 MG/1
4 TABLET, FILM COATED ORAL EVERY 12 HOURS PRN
Qty: 30 TABLET | Refills: 0 | Status: SHIPPED | OUTPATIENT
Start: 2025-02-20

## 2025-02-20 RX ORDER — FAMOTIDINE 40 MG/1
40 TABLET, FILM COATED ORAL
Qty: 90 TABLET | Refills: 1 | Status: SHIPPED | OUTPATIENT
Start: 2025-02-20 | End: 2025-05-21

## 2025-02-20 NOTE — ASSESSMENT & PLAN NOTE
-Possibly due to gastritis versus gastroparesis versus constipation.  -Recommend starting on famotidine 40 mg to be taken at bedtime in addition to omeprazole 40 mg in the morning GERD  -Will prescribe Zofran for as needed purposes.  -I recommend obtaining gastric emptying study to assess for gastroparesis.    Orders:    ondansetron (ZOFRAN) 4 mg tablet; Take 1 tablet (4 mg total) by mouth every 12 (twelve) hours as needed for nausea or vomiting    famotidine (PEPCID) 40 MG tablet; Take 1 tablet (40 mg total) by mouth daily at bedtime    NM gastric emptying; Future

## 2025-02-20 NOTE — PROGRESS NOTES
Name: Thomas Horne      : 1945      MRN: [id number]  Encounter Provider: Anthony Carty MD  Encounter Date: 2025   Encounter department: [facility]  :  Assessment & Plan  Nausea  -Possibly due to gastritis versus gastroparesis versus constipation.  -Recommend starting on famotidine 40 mg to be taken at bedtime in addition to omeprazole 40 mg in the morning GERD  -Will prescribe Zofran for as needed purposes.  -I recommend obtaining gastric emptying study to assess for gastroparesis.    Orders:    ondansetron (ZOFRAN) 4 mg tablet; Take 1 tablet (4 mg total) by mouth every 12 (twelve) hours as needed for nausea or vomiting    famotidine (PEPCID) 40 MG tablet; Take 1 tablet (40 mg total) by mouth daily at bedtime    NM gastric emptying; Future    Constipation, unspecified constipation type  -Reports being constipated since starting iron supplements.  Patient reports that he moves bowels every few days.  Does not take any stool softeners.  -Recommend starting on MiraLAX 1 capful on daily basis.             History of Present Illness   HPI  Thomas Horne is a 80 y.o. male with history of CAD status post PCI, PVD, on dual antiplatelet therapy, diabetes, presents for follow-up.  Patient has had history of anemia however most recent hemoglobin is 13.8, platelets 208.  Patient comes in today with reports of nausea symptoms.  Patient reports that he occasionally has symptoms of nausea especially in the mornings.  He denies any vomiting, denies any melena or hematochezia.  Reports that he also feels like he is belching when he wakes up.  He takes pantoprazole 40 mg in the morning before breakfast.  He denies any dysphagia, loss of appetite or early satiety.     Also reports some constipation.  Denies any rectal bleeding.  No abdominal pain.  Does take iron supplements.  Denies any other new medications.  Hemoglobin A1c is controlled.  Patient has a history of peptic ulcer  "disease however most recent EGD in April 2024 notable for normal exam.  Colonoscopy in February 2024 notable for mild left-sided diverticulosis, hemorrhoids and a small polyp.      Review of Systems   Constitutional: Negative.    HENT: Negative.     Eyes: Negative.    Respiratory: Negative.     Cardiovascular: Negative.    Gastrointestinal:         See HPI.   Endocrine: Negative.    Genitourinary: Negative.    Musculoskeletal: Negative.    Skin: Negative.    Allergic/Immunologic: Negative.    Neurological: Negative.    Hematological: Negative.    Psychiatric/Behavioral: Negative.     All other systems reviewed and are negative.         Objective   /64 (BP Location: Right arm, Patient Position: Sitting, Cuff Size: Standard)   Pulse 68   Ht 6' 2\" (1.88 m)   Wt 101 kg (223 lb)   BMI 28.63 kg/m²      Physical Exam  Vitals and nursing note reviewed.   Constitutional:       General: He is not in acute distress.     Appearance: He is well-developed.   HENT:      Head: Normocephalic and atraumatic.   Eyes:      Conjunctiva/sclera: Conjunctivae normal.   Cardiovascular:      Rate and Rhythm: Normal rate and regular rhythm.      Heart sounds: No murmur heard.  Pulmonary:      Effort: Pulmonary effort is normal. No respiratory distress.      Breath sounds: Normal breath sounds.   Abdominal:      Palpations: Abdomen is soft.      Tenderness: There is no abdominal tenderness.   Musculoskeletal:         General: No swelling.      Cervical back: Neck supple.   Skin:     General: Skin is warm and dry.      Capillary Refill: Capillary refill takes less than 2 seconds.   Neurological:      Mental Status: He is alert.   Psychiatric:         Mood and Affect: Mood normal.           "

## 2025-02-20 NOTE — PROGRESS NOTES
HBO Treatment Course Details       Treatment Notes: Patient tolerated HBOT well.  Offers no complaints.  Dressing change to great toe- cleansed with NSS, Acticoat 3 applied and gauze and christiano with tape,     Treatment Course Number: 10  Total Treatments Ordered:  40     Diagnosis:   1. Diabetic ulcer of left foot associated with type 2 diabetes mellitus, with muscle involvement without evidence of necrosis, unspecified part of foot (HCC)  Hyperbaric oxygen thearpy          HBO Treatment Details:  In-Patient Visit: no  Treatment Length:90 Minutes(Minutes)  Chamber #: Hard sided Monoplace Chamber    Pre-Treatment details:  Pre-treatment protocol Treatment Protocol: 2.0 INDY X 90 minutes w/ 100% oxygen, two 5 minute air breaks                                             Treatment details:  INDY Rate: 2  Started Compression: 0819  Reached Compression: 0827  Total Compression Time: 8 (Minutes)  Total Holding Time: 100 (Minutes)  Started Decompression: 1007  Reached Surface: 1015  Total Decompression Time: 8 (Minutes)  Total Airbreaks:   (Minutes)  Total Time of Treatment: 116 (Minutes)  Symptoms Noted During Treatment: None (Minutes)    Post treatment details:  Left ear clear?: yes  Right ear clear?: yes              PE Tubes present, Left ear?: yes  PE Tubes present, Right ear?: yes        Left ear TEED scale: Grade 0  Right ear TEED scale: Grade 0  Post treatment heart and lung assessment: Post treatment heart and lung auscltation unremarkable. Patient cleared for discharge. Tolerated treatment well.             Vital Signs:  HBO Glucose Reference Range: 100-350 mg/dl   Pre-Treatment Post-Treatment   Time vitals are taken: 0810 Time vitals are taken: 1016   Blood Pressure: 148/82 Blood Pressure: 102/68   Pulse: 72 Pulse: 72   Resp: 16 Resp: 18   Temp: 97.1 °F (36.2 °C) Temp: 96.8 °F (36 °C)   Pre-Inspection Glucose readin Post-Inspection Glucose reading: 160       Allergies   Allergen Reactions   • Lisinopril Rash  and Lip Swelling     Patient Active Problem List    Diagnosis Date Noted   • Gram-positive bacteremia 01/05/2025   • Acute metabolic encephalopathy 01/05/2025   • Bacteremia 01/04/2025   • S/P placement of cardiac pacemaker 12/20/2024   • Bradycardia 12/12/2024   • Multiple open wounds of lower extremity 12/12/2024   • Ulcer of right foot ([facility]) 09/07/2024   • SOB (shortness of breath) 09/06/2024   • Nausea 09/06/2024   • Elevated troponin 09/06/2024   • History of DVT (deep vein thrombosis) 08/26/2024   • Diabetic ulcer of toe of right foot associated with type 2 diabetes mellitus, with fat layer exposed ([facility]) 08/08/2024   • Plantar fasciitis, right 07/10/2024   • Acute deep vein thrombosis (DVT) of left peroneal vein ([facility]) 05/01/2024   • Iron deficiency anemia 04/23/2024   • Shortness of breath 04/09/2024   • History of colon polyps 02/22/2024   • Duodenal ulcer 02/01/2024   • Multiple gastric ulcers 12/27/2023   • Iron deficiency anemia due to chronic blood loss 12/27/2023   • Melena 12/26/2023   • Symptomatic anemia 12/26/2023   • Frequent PVCs 06/17/2023   • Chronic diastolic congestive heart failure ([facility]) 06/16/2023   • Acute kidney injury superimposed on stage 3b chronic kidney disease ([facility]) 06/16/2023   • Diabetic ulcer of right midfoot associated with diabetes mellitus due to underlying condition, limited to breakdown of skin ([facility]) 06/15/2023   • Hypertensive urgency 05/11/2023   • Elevated troponin level not due myocardial infarction 05/11/2023   • Stable angina pectoris ([facility]) 03/07/2023   • Chronic kidney disease-mineral and bone disorder 11/30/2022   • Nonrheumatic aortic valve stenosis 11/11/2022   • Gross hematuria 07/26/2022   • Platelets decreased ([facility]) 07/22/2022   • Acute kidney injury superimposed on chronic kidney disease  ([facility]) 02/16/2022   • Actinic keratoses 01/14/2022   • Type 2 diabetes mellitus with diabetic peripheral angiopathy without gangrene, with long-term current use of insulin ([facility])  01/11/2022   • Varicose veins of left lower extremity 06/25/2021   • History of endovascular stent graft for abdominal aortic aneurysm (AAA) 06/25/2021   • Bruit (arterial) 06/25/2021   • PAD (peripheral artery disease) ([facility]) 06/25/2021   • Type 2 diabetes mellitus with diabetic polyneuropathy, with long-term current use of insulin ([facility]) 06/10/2021   • Mixed hyperlipidemia 05/11/2021   • Primary hypertension 05/11/2021   • Coronary artery disease involving native coronary artery of native heart without angina pectoris 05/11/2021   • S/P angioplasty with stent 05/11/2021   • Hx of CABG 05/11/2021   • S/P AAA repair 05/11/2021   • S/P prostatectomy 05/11/2021   • H/O prostate cancer 05/11/2021     No orders of the defined types were placed in this encounter.

## 2025-02-21 ENCOUNTER — OFFICE VISIT (OUTPATIENT)
Dept: WOUND CARE | Facility: HOSPITAL | Age: 80
End: 2025-02-21
Payer: COMMERCIAL

## 2025-02-21 VITALS
RESPIRATION RATE: 15 BRPM | SYSTOLIC BLOOD PRESSURE: 104 MMHG | TEMPERATURE: 96.9 F | HEART RATE: 60 BPM | DIASTOLIC BLOOD PRESSURE: 72 MMHG

## 2025-02-21 DIAGNOSIS — E11.621 DIABETIC ULCER OF LEFT FOOT ASSOCIATED WITH TYPE 2 DIABETES MELLITUS, WITH MUSCLE INVOLVEMENT WITHOUT EVIDENCE OF NECROSIS, UNSPECIFIED PART OF FOOT (HCC): ICD-10-CM

## 2025-02-21 DIAGNOSIS — L97.514 DIABETIC ULCER OF TOE OF RIGHT FOOT ASSOCIATED WITH TYPE 2 DIABETES MELLITUS, WITH NECROSIS OF BONE (HCC): Primary | ICD-10-CM

## 2025-02-21 DIAGNOSIS — L97.525 DIABETIC ULCER OF LEFT FOOT ASSOCIATED WITH TYPE 2 DIABETES MELLITUS, WITH MUSCLE INVOLVEMENT WITHOUT EVIDENCE OF NECROSIS, UNSPECIFIED PART OF FOOT (HCC): ICD-10-CM

## 2025-02-21 DIAGNOSIS — E11.621 DIABETIC ULCER OF TOE OF RIGHT FOOT ASSOCIATED WITH TYPE 2 DIABETES MELLITUS, WITH NECROSIS OF BONE (HCC): Primary | ICD-10-CM

## 2025-02-21 PROBLEM — K59.00 CONSTIPATION: Status: ACTIVE | Noted: 2025-02-21

## 2025-02-21 LAB
GLUCOSE SERPL-MCNC: 154 MG/DL (ref 65–140)
GLUCOSE SERPL-MCNC: 177 MG/DL (ref 65–140)

## 2025-02-21 PROCEDURE — 11042 DBRDMT SUBQ TIS 1ST 20SQCM/<: CPT | Performed by: FAMILY MEDICINE

## 2025-02-21 PROCEDURE — G0277 HBOT, FULL BODY CHAMBER, 30M: HCPCS | Performed by: FAMILY MEDICINE

## 2025-02-21 PROCEDURE — 99183 HYPERBARIC OXYGEN THERAPY: CPT | Performed by: FAMILY MEDICINE

## 2025-02-21 PROCEDURE — 82948 REAGENT STRIP/BLOOD GLUCOSE: CPT | Performed by: FAMILY MEDICINE

## 2025-02-21 RX ORDER — LIDOCAINE 40 MG/G
CREAM TOPICAL ONCE
Status: COMPLETED | OUTPATIENT
Start: 2025-02-21 | End: 2025-02-21

## 2025-02-21 RX ADMIN — LIDOCAINE: 40 CREAM TOPICAL at 10:43

## 2025-02-21 NOTE — ASSESSMENT & PLAN NOTE
-Reports being constipated since starting iron supplements.  Patient reports that he moves bowels every few days.  Does not take any stool softeners.  -Recommend starting on MiraLAX 1 capful on daily basis.

## 2025-02-21 NOTE — PROGRESS NOTES
Wound Procedure Treatment Diabetic Ulcer Right;Posterior Toe D1, great    Performed by: Graciela Philip RN  Authorized by: Nitza Sotelo DO  Associated wounds:   Wound 11/08/24 Diabetic Ulcer Toe D1, great Right;Posterior    Wound cleansed with:  NSS   Applied primary dressing:  Acticoat   Applied secondary dressing:  Gauze   Dressing secured with:  Ender and Tape   Comments:  Acticoat 3

## 2025-02-21 NOTE — PROGRESS NOTES
HBO Treatment Course Details       Treatment Notes: Patient tolerated the HBOT well.  No complaints offered by patient.  He has [facility] appointment today post dive     Treatment Course Number: 11  Total Treatments Ordered:  40     Diagnosis:   1. Diabetic ulcer of left foot associated with type 2 diabetes mellitus, with muscle involvement without evidence of necrosis, unspecified part of foot (HCC)  Hyperbaric oxygen thearpy          HBO Treatment Details:  In-Patient Visit: no  Treatment Length:90 Minutes(Minutes)  Chamber #: Hard sided Monoplace Chamber    Pre-Treatment details:  Pre-treatment protocol Treatment Protocol: 2.0 INDY X 90 minutes w/ 100% oxygen, two 5 minute air breaks                                             Treatment details:  INDY Rate: 2  Started Compression: 0820  Reached Compression: 0828  Total Compression Time: 8 (Minutes)  Total Holding Time: 100 (Minutes)  Started Decompression: 1008  Reached Surface: 1016  Total Decompression Time: 8 (Minutes)  Total Airbreaks:   (Minutes)  Total Time of Treatment: 116 (Minutes)  Symptoms Noted During Treatment: None (Minutes)    Post treatment details:  Left ear clear?: yes  Right ear clear?: yes              PE Tubes present, Left ear?: yes  PE Tubes present, Right ear?: yes        Left ear TEED scale: Grade 0  Right ear TEED scale: Grade 0  Post treatment heart and lung assessment: Post treatment heart and lung auscltation unremarkable. Patient cleared for discharge. Tolerated treatment well.             Vital Signs:  HBO Glucose Reference Range: 100-350 mg/dl   Pre-Treatment Post-Treatment   Time vitals are taken: 0815 Time vitals are taken: 1017   Blood Pressure: 126/70 Blood Pressure: 104/72   Pulse: 70 Pulse: 60   Resp: 15 Resp: 16   Temp: 97.8 °F (36.6 °C) Temp: 96.9 °F (36.1 °C)   Pre-Inspection Glucose readin Post-Inspection Glucose readin       Allergies   Allergen Reactions   • Lisinopril Rash and Lip Swelling     Patient Active  Problem List    Diagnosis Date Noted   • Gram-positive bacteremia 01/05/2025   • Acute metabolic encephalopathy 01/05/2025   • Bacteremia 01/04/2025   • S/P placement of cardiac pacemaker 12/20/2024   • Bradycardia 12/12/2024   • Multiple open wounds of lower extremity 12/12/2024   • Ulcer of right foot ([facility]) 09/07/2024   • SOB (shortness of breath) 09/06/2024   • Nausea 09/06/2024   • Elevated troponin 09/06/2024   • History of DVT (deep vein thrombosis) 08/26/2024   • Diabetic ulcer of toe of right foot associated with type 2 diabetes mellitus, with fat layer exposed ([facility]) 08/08/2024   • Plantar fasciitis, right 07/10/2024   • Acute deep vein thrombosis (DVT) of left peroneal vein ([facility]) 05/01/2024   • Iron deficiency anemia 04/23/2024   • Shortness of breath 04/09/2024   • History of colon polyps 02/22/2024   • Duodenal ulcer 02/01/2024   • Multiple gastric ulcers 12/27/2023   • Iron deficiency anemia due to chronic blood loss 12/27/2023   • Melena 12/26/2023   • Symptomatic anemia 12/26/2023   • Frequent PVCs 06/17/2023   • Chronic diastolic congestive heart failure ([facility]) 06/16/2023   • Acute kidney injury superimposed on stage 3b chronic kidney disease ([facility]) 06/16/2023   • Diabetic ulcer of right midfoot associated with diabetes mellitus due to underlying condition, limited to breakdown of skin ([facility]) 06/15/2023   • Hypertensive urgency 05/11/2023   • Elevated troponin level not due myocardial infarction 05/11/2023   • Stable angina pectoris ([facility]) 03/07/2023   • Chronic kidney disease-mineral and bone disorder 11/30/2022   • Nonrheumatic aortic valve stenosis 11/11/2022   • Gross hematuria 07/26/2022   • Platelets decreased ([facility]) 07/22/2022   • Acute kidney injury superimposed on chronic kidney disease  ([facility]) 02/16/2022   • Actinic keratoses 01/14/2022   • Type 2 diabetes mellitus with diabetic peripheral angiopathy without gangrene, with long-term current use of insulin ([facility]) 01/11/2022   • Varicose veins of  left lower extremity 06/25/2021   • History of endovascular stent graft for abdominal aortic aneurysm (AAA) 06/25/2021   • Bruit (arterial) 06/25/2021   • PAD (peripheral artery disease) ([facility]) 06/25/2021   • Type 2 diabetes mellitus with diabetic polyneuropathy, with long-term current use of insulin ([facility]) 06/10/2021   • Mixed hyperlipidemia 05/11/2021   • Primary hypertension 05/11/2021   • Coronary artery disease involving native coronary artery of native heart without angina pectoris 05/11/2021   • S/P angioplasty with stent 05/11/2021   • Hx of CABG 05/11/2021   • S/P AAA repair 05/11/2021   • S/P prostatectomy 05/11/2021   • H/O prostate cancer 05/11/2021     No orders of the defined types were placed in this encounter.

## 2025-02-21 NOTE — PROGRESS NOTES
Name: Thomas Horne      : 1945      MRN: [id number]  Encounter Provider: Nitza Sotelo DO  Encounter Date: 2025   Encounter department: [facility]  :  Assessment & Plan  Diabetic ulcer of toe of right foot associated with type 2 diabetes mellitus, with necrosis of bone (HCC)  Wound is improved  Debrided as below  Continue wound management with Acticoat, see wound orders below  Tight diabetic control and proper offloading discussed  Continue HBO therapy  Follow-up in 1 week or call sooner with questions or concerns    Lab Results   Component Value Date    HGBA1C 6.4 (H) 02/15/2025       Orders:  •  lidocaine (LMX) 4 % cream  •  Wound cleansing and dressings Diabetic Ulcer Right;Posterior Toe D1, great; Future  •  Wound Procedure Treatment Diabetic Ulcer Right;Posterior Toe D1, great  •  Debridement Diabetic Ulcer Right;Posterior Toe D1, great        History of Present Illness   Chief Complaint   Patient presents with   • Follow Up Wound Care Visit     Right great toe   Here for wound follow up.  Patient presents for follow-up of a Sumner 4 DFU of the right great toe.  No increased pain or drainage.  Has been using Acticoat on the wound.  Undergoing HBO treatments, completed 11th treatment today.      Objective   /72   Pulse 60   Temp (!) 96.9 °F (36.1 °C)   Resp 15     Physical Exam  Wound 24 Diabetic Ulcer Toe D1, great Right;Posterior (Active)   Enter Sumner score: Sumner Grade 4: Partial-foot gangrene 25 1040   Wound Image   25 1049   Wound Description Yellow;Slough;Pink 25 1040   Clara-wound Assessment Dry;Callus 25 1040   Wound Length (cm) 0.4 cm 25 1040   Wound Width (cm) 1.6 cm 25 1040   Wound Depth (cm) 0.2 cm 25 1040   Wound Surface Area (cm^2) 0.64 cm^2 25 1040   Wound Volume (cm^3) 0.128 cm^3 25 1040   Calculated Wound Volume (cm^3) 0.13 cm^3 25 1040   Change in Wound Size % -116.67  "02/21/25 1040   Drainage Amount Scant 02/21/25 1040   Drainage Description Serosanguineous 02/21/25 1040   Non-staged Wound Description Full thickness 02/21/25 1040   Dressing Status Intact 02/21/25 1040       Wound 12/16/24 Incision Chest Left;Upper (Active)       Wound 01/07/25 Catheter entry/exit site Groin Right (Active)       Debridement   Wound 11/08/24 Diabetic Ulcer Toe D1, great Right;Posterior    Universal Protocol:  procedure performed by consultantConsent: Verbal consent obtained.  Consent given by: patient  Time out: Immediately prior to procedure a \"time out\" was called to verify the correct patient, procedure, equipment, support staff and site/side marked as required.  Timeout called at: 2/21/2025 10:55 AM.  Patient understanding: patient states understanding of the procedure being performed  Patient identity confirmed: verbally with patient    Debridement Details  Performed by: physician  Debridement type: surgical  Level of debridement: subcutaneous tissue  Pain control: lidocaine 4%      Post-debridement measurements  Length (cm): 0.4  Width (cm): 1.6  Depth (cm): 0.3  Percent debrided: 100%  Surface Area (cm^2): 0.64  Area Debrided (cm^2): 0.64  Volume (cm^3): 0.19    Tissue and other material debrided: subcutaneous tissue  Devitalized tissue debrided: exudate and slough  Instrument(s) utilized: curette  Bleeding: small  Hemostasis obtained with: pressure  Response to treatment: procedure was tolerated well       Results from last 6 Months   Lab Units 01/03/25  1513   WOUND CULTURE  1+ Growth of Staphylococcus aureus*  1+ Growth of           "

## 2025-02-21 NOTE — PATIENT INSTRUCTIONS
Orders Placed This Encounter   Procedures    Wound cleansing and dressings Diabetic Ulcer Right;Posterior Toe D1, great     Right Great Toe Wound:      Wash your hands with soap and water. Remove old dressing, discard into plastic bag and place in trash. Cleanse the wound with normal saline prior to applying a clean dressing. Do not use tissue or cotton balls. Do not scrub the wound. Pat dry using gauze.      Shower no, sponge bath only Discontinue the silver alginate.      Apply acticoat 3 to wound bed. (The acticoat can discolor the wound area to a gray or black)   Cover with gauze   Secure with rolled gauze and tape      Change dressing 3 times weekly      Edema control: Elevate extremity to heart level when sitting. Do not leave dependent.      Offloading: Limit walking to only necessity. Wear surgical shoe to right foot with any walking Wound infection: If you have signs of infection please call the wound center. If the wound center is closedplease go to the Emergency department. Some signs of infection: fever, chills, increased redness, red streaks, increase in pain, increased drainage. Drainage with an odor, Change in drainage color: white/milky/green/tan/yellow, an increase in swelling, chest pain and/or shortness of breath.      Protein: Eat protein with each meal to promote healing. Examples of protein are fish, meat, chicken, nuts, peanut butter, eggs, lentils, edamame or a protein shake.     Standing Status:   Future     Expiration Date:   2/28/2025

## 2025-02-25 ENCOUNTER — OFFICE VISIT (OUTPATIENT)
Dept: WOUND CARE | Facility: HOSPITAL | Age: 80
End: 2025-02-25
Payer: COMMERCIAL

## 2025-02-25 VITALS
TEMPERATURE: 96.6 F | HEART RATE: 68 BPM | DIASTOLIC BLOOD PRESSURE: 64 MMHG | RESPIRATION RATE: 18 BRPM | SYSTOLIC BLOOD PRESSURE: 112 MMHG

## 2025-02-25 DIAGNOSIS — E11.621 DIABETIC ULCER OF LEFT FOOT ASSOCIATED WITH TYPE 2 DIABETES MELLITUS, WITH MUSCLE INVOLVEMENT WITHOUT EVIDENCE OF NECROSIS, UNSPECIFIED PART OF FOOT (HCC): ICD-10-CM

## 2025-02-25 DIAGNOSIS — E11.621 DIABETIC ULCER OF TOE OF RIGHT FOOT ASSOCIATED WITH TYPE 2 DIABETES MELLITUS, WITH NECROSIS OF BONE (HCC): ICD-10-CM

## 2025-02-25 DIAGNOSIS — L97.514 DIABETIC ULCER OF TOE OF RIGHT FOOT ASSOCIATED WITH TYPE 2 DIABETES MELLITUS, WITH NECROSIS OF BONE (HCC): ICD-10-CM

## 2025-02-25 DIAGNOSIS — L97.525 DIABETIC ULCER OF LEFT FOOT ASSOCIATED WITH TYPE 2 DIABETES MELLITUS, WITH MUSCLE INVOLVEMENT WITHOUT EVIDENCE OF NECROSIS, UNSPECIFIED PART OF FOOT (HCC): ICD-10-CM

## 2025-02-25 PROCEDURE — G0277 HBOT, FULL BODY CHAMBER, 30M: HCPCS | Performed by: FAMILY MEDICINE

## 2025-02-25 PROCEDURE — 99183 HYPERBARIC OXYGEN THERAPY: CPT | Performed by: FAMILY MEDICINE

## 2025-02-25 NOTE — PROGRESS NOTES
HBO Treatment Course Details       Treatment Notes: ***     Treatment Course Number:    Total Treatments Ordered:        Diagnosis: No diagnosis found.    HBO Treatment Details:  In-Patient Visit: {YES NO AND WILDCARDS:75649}  Treatment Length: (Minutes)  Chamber #:      Pre-Treatment details:  Pre-treatment protocol    Left ear clear?: yes  Right ear clear?: yes  Left ear intact?: yes  Right ear intact?: yes        PE Tubes present, Left ear?: yes  PE Tubes present, Right ear?: yes  Left ear irrigated?: no  Right ear irrigated?: no  Left ear TEED scale: Grade 0  Right ear TEED scale: Grade 0   Pretreatment heart and lung assessment: Pretreatment heart and lung auscltation unremarkable. Patient cleared for HBOT     Treatment details:  INDY Rate:    Started Compression:    Reached Compression:    Total Compression Time:   (Minutes)  Total Holding Time:   (Minutes)  Started Decompression:    Reached Surface:    Total Decompression Time:   (Minutes)  Total Airbreaks:   (Minutes)  Total Time of Treatment:   (Minutes)  Symptoms Noted During Treatment:   (Minutes)    Post treatment details:                                                    Vital Signs:  HBO Glucose Reference Range: 100-350 mg/dl   Pre-Treatment Post-Treatment                                           Allergies   Allergen Reactions    Lisinopril Rash and Lip Swelling     Patient Active Problem List    Diagnosis Date Noted    Constipation 02/21/2025    Gram-positive bacteremia 01/05/2025    Acute metabolic encephalopathy 01/05/2025    Bacteremia 01/04/2025    S/P placement of cardiac pacemaker 12/20/2024    Bradycardia 12/12/2024    Multiple open wounds of lower extremity 12/12/2024    Ulcer of right foot (HCC) 09/07/2024    SOB (shortness of breath) 09/06/2024    Nausea 09/06/2024    Elevated troponin 09/06/2024    History of DVT (deep vein thrombosis) 08/26/2024    Diabetic ulcer of toe of right foot associated with type 2 diabetes mellitus, with fat  layer exposed ([facility]) 08/08/2024    Plantar fasciitis, right 07/10/2024    Acute deep vein thrombosis (DVT) of left peroneal vein ([facility]) 05/01/2024    Iron deficiency anemia 04/23/2024    Shortness of breath 04/09/2024    History of colon polyps 02/22/2024    Duodenal ulcer 02/01/2024    Multiple gastric ulcers 12/27/2023    Iron deficiency anemia due to chronic blood loss 12/27/2023    Melena 12/26/2023    Symptomatic anemia 12/26/2023    Frequent PVCs 06/17/2023    Chronic diastolic congestive heart failure ([facility]) 06/16/2023    Acute kidney injury superimposed on stage 3b chronic kidney disease ([facility]) 06/16/2023    Diabetic ulcer of right midfoot associated with diabetes mellitus due to underlying condition, limited to breakdown of skin ([facility]) 06/15/2023    Hypertensive urgency 05/11/2023    Elevated troponin level not due myocardial infarction 05/11/2023    Stable angina pectoris ([facility]) 03/07/2023    Chronic kidney disease-mineral and bone disorder 11/30/2022    Nonrheumatic aortic valve stenosis 11/11/2022    Gross hematuria 07/26/2022    Platelets decreased ([facility]) 07/22/2022    Acute kidney injury superimposed on chronic kidney disease  ([facility]) 02/16/2022    Actinic keratoses 01/14/2022    Type 2 diabetes mellitus with diabetic peripheral angiopathy without gangrene, with long-term current use of insulin ([facility]) 01/11/2022    Varicose veins of left lower extremity 06/25/2021    History of endovascular stent graft for abdominal aortic aneurysm (AAA) 06/25/2021    Bruit (arterial) 06/25/2021    PAD (peripheral artery disease) ([facility]) 06/25/2021    Type 2 diabetes mellitus with diabetic polyneuropathy, with long-term current use of insulin ([facility]) 06/10/2021    Mixed hyperlipidemia 05/11/2021    Primary hypertension 05/11/2021    Coronary artery disease involving native coronary artery of native heart without angina pectoris 05/11/2021    S/P angioplasty with stent 05/11/2021    Hx of CABG 05/11/2021    S/P AAA repair 05/11/2021     S/P prostatectomy 05/11/2021    H/O prostate cancer 05/11/2021     No orders of the defined types were placed in this encounter.

## 2025-02-26 ENCOUNTER — OFFICE VISIT (OUTPATIENT)
Dept: WOUND CARE | Facility: HOSPITAL | Age: 80
End: 2025-02-26
Payer: COMMERCIAL

## 2025-02-26 VITALS
DIASTOLIC BLOOD PRESSURE: 74 MMHG | TEMPERATURE: 96.4 F | HEART RATE: 66 BPM | RESPIRATION RATE: 18 BRPM | SYSTOLIC BLOOD PRESSURE: 132 MMHG

## 2025-02-26 DIAGNOSIS — E11.621 DIABETIC ULCER OF TOE OF RIGHT FOOT ASSOCIATED WITH TYPE 2 DIABETES MELLITUS, WITH NECROSIS OF BONE (HCC): ICD-10-CM

## 2025-02-26 DIAGNOSIS — L97.514 DIABETIC ULCER OF TOE OF RIGHT FOOT ASSOCIATED WITH TYPE 2 DIABETES MELLITUS, WITH NECROSIS OF BONE (HCC): ICD-10-CM

## 2025-02-26 LAB
GLUCOSE SERPL-MCNC: 169 MG/DL (ref 65–140)
GLUCOSE SERPL-MCNC: 193 MG/DL (ref 65–140)

## 2025-02-26 PROCEDURE — G0277 HBOT, FULL BODY CHAMBER, 30M: HCPCS | Performed by: FAMILY MEDICINE

## 2025-02-26 PROCEDURE — 99183 HYPERBARIC OXYGEN THERAPY: CPT | Performed by: FAMILY MEDICINE

## 2025-02-26 PROCEDURE — 82948 REAGENT STRIP/BLOOD GLUCOSE: CPT | Performed by: FAMILY MEDICINE

## 2025-02-26 NOTE — PROGRESS NOTES
HBO Treatment Course Details       Treatment Notes: Patient tolerated HBO dive well     Treatment Course Number: 13  Total Treatments Ordered:  40     Diagnosis:   1. Diabetic ulcer of toe of right foot associated with type 2 diabetes mellitus, with necrosis of bone (HCC)  Hyperbaric oxygen thearpy          HBO Treatment Details:  In-Patient Visit: no  Treatment Length:90 Minutes(Minutes)  Chamber #: Hard sided Monoplace Chamber    Pre-Treatment details:  Pre-treatment protocol Treatment Protocol: 2.0 INDY X 90 minutes w/ 100% oxygen, two 5 minute air breaks  Left ear clear?: yes  Right ear clear?: yes  Left ear intact?: yes  Right ear intact?: yes  Left ear color: Enck  Right ear color: Pink  PE Tubes present, Left ear?: yes  PE Tubes present, Right ear?: yes  Left ear irrigated?: no  Right ear irrigated?: no  Left ear TEED scale: Grade I  Right ear TEED scale: Grade I   Pretreatment heart and lung assessment: Pretreatment heart and lung auscltation unremarkable. Patient cleared for HBOT     Treatment details:  INDY Rate: 2  Started Compression: 0835  Reached Compression: 0843  Total Compression Time: 8 (Minutes)  Total Holding Time: 100 (Minutes)  Started Decompression: 1023  Reached Surface: 1031  Total Decompression Time: 8 (Minutes)  Total Airbreaks:   (Minutes)  Total Time of Treatment: 116 (Minutes)  Symptoms Noted During Treatment: None (Minutes)    Post treatment details:                                                    Vital Signs:  HBO Glucose Reference Range: 100-350 mg/dl   Pre-Treatment Post-Treatment   Time vitals are taken: 0805 Time vitals are taken: 1035   Blood Pressure: 132/74 Blood Pressure: 122/64   Pulse: 66 Pulse: 68   Resp: 18 Resp: 18   Temp: 96.4 °F (35.8 °C) Temp: 97.5 °F (36.4 °C)   Pre-Inspection Glucose readin Post-Inspection Glucose readin       Allergies   Allergen Reactions    Lisinopril Rash and Lip Swelling     Patient Active Problem List    Diagnosis Date Noted     Constipation 02/21/2025    Gram-positive bacteremia 01/05/2025    Acute metabolic encephalopathy 01/05/2025    Bacteremia 01/04/2025    S/P placement of cardiac pacemaker 12/20/2024    Bradycardia 12/12/2024    Multiple open wounds of lower extremity 12/12/2024    Ulcer of right foot (HCC) 09/07/2024    SOB (shortness of breath) 09/06/2024    Nausea 09/06/2024    Elevated troponin 09/06/2024    History of DVT (deep vein thrombosis) 08/26/2024    Diabetic ulcer of toe of right foot associated with type 2 diabetes mellitus, with fat layer exposed ([facility]) 08/08/2024    Plantar fasciitis, right 07/10/2024    Acute deep vein thrombosis (DVT) of left peroneal vein ([facility]) 05/01/2024    Iron deficiency anemia 04/23/2024    Shortness of breath 04/09/2024    History of colon polyps 02/22/2024    Duodenal ulcer 02/01/2024    Multiple gastric ulcers 12/27/2023    Iron deficiency anemia due to chronic blood loss 12/27/2023    Melena 12/26/2023    Symptomatic anemia 12/26/2023    Frequent PVCs 06/17/2023    Chronic diastolic congestive heart failure ([facility]) 06/16/2023    Acute kidney injury superimposed on stage 3b chronic kidney disease ([facility]) 06/16/2023    Diabetic ulcer of right midfoot associated with diabetes mellitus due to underlying condition, limited to breakdown of skin ([facility]) 06/15/2023    Hypertensive urgency 05/11/2023    Elevated troponin level not due myocardial infarction 05/11/2023    Stable angina pectoris ([facility]) 03/07/2023    Chronic kidney disease-mineral and bone disorder 11/30/2022    Nonrheumatic aortic valve stenosis 11/11/2022    Gross hematuria 07/26/2022    Platelets decreased ([facility]) 07/22/2022    Acute kidney injury superimposed on chronic kidney disease  ([facility]) 02/16/2022    Actinic keratoses 01/14/2022    Type 2 diabetes mellitus with diabetic peripheral angiopathy without gangrene, with long-term current use of insulin ([facility]) 01/11/2022    Varicose veins of left lower extremity 06/25/2021    History of  endovascular stent graft for abdominal aortic aneurysm (AAA) 06/25/2021    Bruit (arterial) 06/25/2021    PAD (peripheral artery disease) ([facility] 06/25/2021    Type 2 diabetes mellitus with diabetic polyneuropathy, with long-term current use of insulin ([facility]) 06/10/2021    Mixed hyperlipidemia 05/11/2021    Primary hypertension 05/11/2021    Coronary artery disease involving native coronary artery of native heart without angina pectoris 05/11/2021    S/P angioplasty with stent 05/11/2021    Hx of CABG 05/11/2021    S/P AAA repair 05/11/2021    S/P prostatectomy 05/11/2021    H/O prostate cancer 05/11/2021     No orders of the defined types were placed in this encounter.

## 2025-02-26 NOTE — PROGRESS NOTES
HBO Treatment Course Details       Treatment Notes: Patient tolerated HBO dive well.     Treatment Course Number: 12  Total Treatments Ordered:  40     Diagnosis:   1. Diabetic ulcer of left foot associated with type 2 diabetes mellitus, with muscle involvement without evidence of necrosis, unspecified part of foot (HCC)  Hyperbaric oxygen thearpy      2. Diabetic ulcer of toe of right foot associated with type 2 diabetes mellitus, with necrosis of bone (HCC)  Hyperbaric oxygen thearpy          HBO Treatment Details:  In-Patient Visit: no  Treatment Length:90 Minutes(Minutes)  Chamber #: Hard sided Monoplace Chamber    Pre-Treatment details:  Pre-treatment protocol Treatment Protocol: 2.0 INDY X 90 minutes w/ 100% oxygen, two 5 minute air breaks  Left ear clear?: yes  Right ear clear?: yes  Left ear intact?: yes  Right ear intact?: yes        PE Tubes present, Left ear?: yes  PE Tubes present, Right ear?: yes  Left ear irrigated?: no  Right ear irrigated?: no  Left ear TEED scale: Grade 0  Right ear TEED scale: Grade 0   Pretreatment heart and lung assessment: Pretreatment heart and lung auscltation unremarkable. Patient cleared for HBOT     Treatment details:  INDY Rate: 2  Started Compression: 0839  Reached Compression: 0847  Total Compression Time: 8 (Minutes)  Total Holding Time: 100 (Minutes)  Started Decompression: 1027  Reached Surface: 1035  Total Decompression Time: 8 (Minutes)  Total Airbreaks:   (Minutes)  Total Time of Treatment: 116 (Minutes)  Symptoms Noted During Treatment: None (Minutes)    Post treatment details:                                                    Vital Signs:  HBO Glucose Reference Range: 100-350 mg/dl   Pre-Treatment Post-Treatment   Time vitals are taken: 0815 Time vitals are taken: 1045   Blood Pressure: 112/64 Blood Pressure: 102/68   Pulse: 68 Pulse: 72   Resp: 18 Resp: 18   Temp: 96.6 °F (35.9 °C) Temp: 97 °F (36.1 °C)   Pre-Inspection Glucose readin Post-Inspection  Glucose readin       Allergies   Allergen Reactions    Lisinopril Rash and Lip Swelling     Patient Active Problem List    Diagnosis Date Noted    Constipation 2025    Gram-positive bacteremia 2025    Acute metabolic encephalopathy 2025    Bacteremia 2025    S/P placement of cardiac pacemaker 2024    Bradycardia 2024    Multiple open wounds of lower extremity 2024    Ulcer of right foot (HCC) 2024    SOB (shortness of breath) 2024    Nausea 2024    Elevated troponin 2024    History of DVT (deep vein thrombosis) 2024    Diabetic ulcer of toe of right foot associated with type 2 diabetes mellitus, with fat layer exposed ([facility]) 2024    Plantar fasciitis, right 07/10/2024    Acute deep vein thrombosis (DVT) of left peroneal vein ([facility]) 2024    Iron deficiency anemia 2024    Shortness of breath 2024    History of colon polyps 2024    Duodenal ulcer 2024    Multiple gastric ulcers 2023    Iron deficiency anemia due to chronic blood loss 2023    Melena 2023    Symptomatic anemia 2023    Frequent PVCs 2023    Chronic diastolic congestive heart failure ([facility]) 2023    Acute kidney injury superimposed on stage 3b chronic kidney disease ([facility]) 2023    Diabetic ulcer of right midfoot associated with diabetes mellitus due to underlying condition, limited to breakdown of skin ([facility]) 06/15/2023    Hypertensive urgency 2023    Elevated troponin level not due myocardial infarction 2023    Stable angina pectoris ([facility]) 2023    Chronic kidney disease-mineral and bone disorder 2022    Nonrheumatic aortic valve stenosis 2022    Gross hematuria 2022    Platelets decreased ([facility]) 2022    Acute kidney injury superimposed on chronic kidney disease  ([facility]) 2022    Actinic keratoses 2022    Type 2 diabetes mellitus with diabetic peripheral  angiopathy without gangrene, with long-term current use of insulin ([facility]) 01/11/2022    Varicose veins of left lower extremity 06/25/2021    History of endovascular stent graft for abdominal aortic aneurysm (AAA) 06/25/2021    Bruit (arterial) 06/25/2021    PAD (peripheral artery disease) ([facility]) 06/25/2021    Type 2 diabetes mellitus with diabetic polyneuropathy, with long-term current use of insulin ([facility]) 06/10/2021    Mixed hyperlipidemia 05/11/2021    Primary hypertension 05/11/2021    Coronary artery disease involving native coronary artery of native heart without angina pectoris 05/11/2021    S/P angioplasty with stent 05/11/2021    Hx of CABG 05/11/2021    S/P AAA repair 05/11/2021    S/P prostatectomy 05/11/2021    H/O prostate cancer 05/11/2021     No orders of the defined types were placed in this encounter.

## 2025-02-27 ENCOUNTER — OFFICE VISIT (OUTPATIENT)
Dept: WOUND CARE | Facility: HOSPITAL | Age: 80
End: 2025-02-27
Payer: COMMERCIAL

## 2025-02-27 DIAGNOSIS — L97.514 DIABETIC ULCER OF TOE OF RIGHT FOOT ASSOCIATED WITH TYPE 2 DIABETES MELLITUS, WITH NECROSIS OF BONE (HCC): Primary | ICD-10-CM

## 2025-02-27 DIAGNOSIS — E11.621 DIABETIC ULCER OF TOE OF RIGHT FOOT ASSOCIATED WITH TYPE 2 DIABETES MELLITUS, WITH NECROSIS OF BONE (HCC): Primary | ICD-10-CM

## 2025-02-27 LAB
GLUCOSE SERPL-MCNC: 107 MG/DL (ref 65–140)
GLUCOSE SERPL-MCNC: 117 MG/DL (ref 65–140)
GLUCOSE SERPL-MCNC: 150 MG/DL (ref 65–140)

## 2025-02-27 PROCEDURE — 82948 REAGENT STRIP/BLOOD GLUCOSE: CPT | Performed by: STUDENT IN AN ORGANIZED HEALTH CARE EDUCATION/TRAINING PROGRAM

## 2025-02-27 PROCEDURE — 99183 HYPERBARIC OXYGEN THERAPY: CPT | Performed by: STUDENT IN AN ORGANIZED HEALTH CARE EDUCATION/TRAINING PROGRAM

## 2025-02-27 PROCEDURE — G0277 HBOT, FULL BODY CHAMBER, 30M: HCPCS | Performed by: STUDENT IN AN ORGANIZED HEALTH CARE EDUCATION/TRAINING PROGRAM

## 2025-02-27 NOTE — PROGRESS NOTES
HBO Treatment Course Details       Treatment Notes: Patient tolerated treatment well.  No complications.     Treatment Course Number: 14  Total Treatments Ordered:  40     Diagnosis:   1. Diabetic ulcer of toe of right foot associated with type 2 diabetes mellitus, with necrosis of bone (HCC)  Hyperbaric oxygen thearpy          HBO Treatment Details:  In-Patient Visit: no  Treatment Length:90 Minutes(Minutes)  Chamber #: Hard sided Monoplace Chamber    Pre-Treatment details:  Pre-treatment protocol Treatment Protocol: 2.0 INDY X 90 minutes w/ 100% oxygen, no air break  Left ear clear?: yes  Right ear clear?: yes  Left ear intact?: yes  Right ear intact?: yes  Left ear color: significant cerumen  Right ear color: significant cerumen  PE Tubes present, Left ear?: yes  PE Tubes present, Right ear?: yes  Left ear irrigated?: no  Right ear irrigated?: no  Left ear TEED scale: Grade 0  Right ear TEED scale: Grade 0   Pretreatment heart and lung assessment: Pretreatment heart and lung auscltation unremarkable. Patient cleared for HBOT     Treatment details:  INDY Rate: 2  Started Compression: 0821  Reached Compression: 0828  Total Compression Time: 7 (Minutes)  Total Holding Time: 100 (Minutes)  Started Decompression: 1008  Reached Surface: 1016  Total Decompression Time: 8 (Minutes)  Total Airbreaks:   (Minutes)  Total Time of Treatment: 115 (Minutes)  Symptoms Noted During Treatment: None (Minutes)    Post treatment details:  Left ear clear?: yes  Right ear clear?: yes  Left ears intact?: yes  Right ears intact?: yes  Left ear color: translucent  Right ear color: translucent  PE Tubes present, Left ear?: yes  PE Tubes present, Right ear?: yes        Left ear TEED scale: Grade 0  Right ear TEED scale: Grade 0  Post treatment heart and lung assessment: Post treatment heart and lung auscltation unremarkable. Patient cleared for discharge. Tolerated treatment well.             Vital Signs:  HBO Glucose Reference Range: 100-350  mg/dl   Pre-Treatment Post-Treatment   Time vitals are taken: 0815 Time vitals are taken: 1020   Blood Pressure: 158/70 Blood Pressure: 128/86   Pulse: 72 Pulse: 72   Resp: 18 Resp: 16   Temp: 95.7 °F (35.4 °C) Temp: 98 °F (36.7 °C)   Pre-Inspection Glucose reading: (!) 107 Post-Inspection Glucose readin       Allergies   Allergen Reactions    Lisinopril Rash and Lip Swelling     Patient Active Problem List    Diagnosis Date Noted    Constipation 2025    Gram-positive bacteremia 2025    Acute metabolic encephalopathy 2025    Bacteremia 2025    S/P placement of cardiac pacemaker 2024    Bradycardia 2024    Multiple open wounds of lower extremity 2024    Ulcer of right foot (HCC) 2024    SOB (shortness of breath) 2024    Nausea 2024    Elevated troponin 2024    History of DVT (deep vein thrombosis) 2024    Diabetic ulcer of toe of right foot associated with type 2 diabetes mellitus, with fat layer exposed (HCC) 2024    Plantar fasciitis, right 07/10/2024    Acute deep vein thrombosis (DVT) of left peroneal vein (HCC) 2024    Iron deficiency anemia 2024    Shortness of breath 2024    History of colon polyps 2024    Duodenal ulcer 2024    Multiple gastric ulcers 2023    Iron deficiency anemia due to chronic blood loss 2023    Melena 2023    Symptomatic anemia 2023    Frequent PVCs 2023    Chronic diastolic congestive heart failure (HCC) 2023    Acute kidney injury superimposed on stage 3b chronic kidney disease (HCC) 2023    Diabetic ulcer of right midfoot associated with diabetes mellitus due to underlying condition, limited to breakdown of skin (HCC) 06/15/2023    Hypertensive urgency 2023    Elevated troponin level not due myocardial infarction 2023    Stable angina pectoris (HCC) 2023    Chronic kidney disease-mineral and bone disorder  11/30/2022    Nonrheumatic aortic valve stenosis 11/11/2022    Gross hematuria 07/26/2022    Platelets decreased ([facility]) 07/22/2022    Acute kidney injury superimposed on chronic kidney disease  ([facility]) 02/16/2022    Actinic keratoses 01/14/2022    Type 2 diabetes mellitus with diabetic peripheral angiopathy without gangrene, with long-term current use of insulin ([facility]) 01/11/2022    Varicose veins of left lower extremity 06/25/2021    History of endovascular stent graft for abdominal aortic aneurysm (AAA) 06/25/2021    Bruit (arterial) 06/25/2021    PAD (peripheral artery disease) ([facility]) 06/25/2021    Type 2 diabetes mellitus with diabetic polyneuropathy, with long-term current use of insulin ([facility]) 06/10/2021    Mixed hyperlipidemia 05/11/2021    Primary hypertension 05/11/2021    Coronary artery disease involving native coronary artery of native heart without angina pectoris 05/11/2021    S/P angioplasty with stent 05/11/2021    Hx of CABG 05/11/2021    S/P AAA repair 05/11/2021    S/P prostatectomy 05/11/2021    H/O prostate cancer 05/11/2021     No orders of the defined types were placed in this encounter.

## 2025-02-27 NOTE — PROGRESS NOTES
HBO Treatment Course Details       Treatment Notes: Patient tolerated HBOT well.      Treatment Course Number: 14  Total Treatments Ordered:  40     Diagnosis:   1. Diabetic ulcer of toe of right foot associated with type 2 diabetes mellitus, with necrosis of bone (HCC)  Hyperbaric oxygen thearpy          HBO Treatment Details:  In-Patient Visit: no  Treatment Length:90 Minutes(Minutes)  Chamber #: Hard sided Monoplace Chamber    Pre-Treatment details:  Pre-treatment protocol Treatment Protocol: 2.0 INDY X 90 minutes w/ 100% oxygen, no air break  Left ear clear?: yes  Right ear clear?: yes  Left ear intact?: yes  Right ear intact?: yes  Left ear color: significant cerumen  Right ear color: significant cerumen  PE Tubes present, Left ear?: yes  PE Tubes present, Right ear?: yes  Left ear irrigated?: no  Right ear irrigated?: no  Left ear TEED scale: Grade 0  Right ear TEED scale: Grade 0   Pretreatment heart and lung assessment: Pretreatment heart and lung auscltation unremarkable. Patient cleared for HBOT     Treatment details:  INDY Rate: 2  Started Compression: 0821  Reached Compression: 0828  Total Compression Time: 7 (Minutes)  Total Holding Time: 100 (Minutes)  Started Decompression: 1008  Reached Surface: 1016  Total Decompression Time: 8 (Minutes)  Total Airbreaks:   (Minutes)  Total Time of Treatment: 115 (Minutes)  Symptoms Noted During Treatment: None (Minutes)    Post treatment details:                                                    Vital Signs:  HBO Glucose Reference Range: 100-350 mg/dl   Pre-Treatment Post-Treatment   Time vitals are taken: 0815 Time vitals are taken: 1020   Blood Pressure: 158/70 Blood Pressure: 128/86   Pulse: 72 Pulse: 72   Resp: 18 Resp: 16   Temp: 95.7 °F (35.4 °C) Temp: 98 °F (36.7 °C)   Pre-Inspection Glucose reading: (!) 107 Post-Inspection Glucose readin       Allergies   Allergen Reactions    Lisinopril Rash and Lip Swelling     Patient Active Problem List     Diagnosis Date Noted    Constipation 02/21/2025    Gram-positive bacteremia 01/05/2025    Acute metabolic encephalopathy 01/05/2025    Bacteremia 01/04/2025    S/P placement of cardiac pacemaker 12/20/2024    Bradycardia 12/12/2024    Multiple open wounds of lower extremity 12/12/2024    Ulcer of right foot (HCC) 09/07/2024    SOB (shortness of breath) 09/06/2024    Nausea 09/06/2024    Elevated troponin 09/06/2024    History of DVT (deep vein thrombosis) 08/26/2024    Diabetic ulcer of toe of right foot associated with type 2 diabetes mellitus, with fat layer exposed ([facility]) 08/08/2024    Plantar fasciitis, right 07/10/2024    Acute deep vein thrombosis (DVT) of left peroneal vein ([facility]) 05/01/2024    Iron deficiency anemia 04/23/2024    Shortness of breath 04/09/2024    History of colon polyps 02/22/2024    Duodenal ulcer 02/01/2024    Multiple gastric ulcers 12/27/2023    Iron deficiency anemia due to chronic blood loss 12/27/2023    Melena 12/26/2023    Symptomatic anemia 12/26/2023    Frequent PVCs 06/17/2023    Chronic diastolic congestive heart failure ([facility]) 06/16/2023    Acute kidney injury superimposed on stage 3b chronic kidney disease ([facility]) 06/16/2023    Diabetic ulcer of right midfoot associated with diabetes mellitus due to underlying condition, limited to breakdown of skin ([facility]) 06/15/2023    Hypertensive urgency 05/11/2023    Elevated troponin level not due myocardial infarction 05/11/2023    Stable angina pectoris ([facility]) 03/07/2023    Chronic kidney disease-mineral and bone disorder 11/30/2022    Nonrheumatic aortic valve stenosis 11/11/2022    Gross hematuria 07/26/2022    Platelets decreased ([facility]) 07/22/2022    Acute kidney injury superimposed on chronic kidney disease  ([facility]) 02/16/2022    Actinic keratoses 01/14/2022    Type 2 diabetes mellitus with diabetic peripheral angiopathy without gangrene, with long-term current use of insulin ([facility]) 01/11/2022    Varicose veins of left lower extremity  06/25/2021    History of endovascular stent graft for abdominal aortic aneurysm (AAA) 06/25/2021    Bruit (arterial) 06/25/2021    PAD (peripheral artery disease) ([facility]) 06/25/2021    Type 2 diabetes mellitus with diabetic polyneuropathy, with long-term current use of insulin ([facility]) 06/10/2021    Mixed hyperlipidemia 05/11/2021    Primary hypertension 05/11/2021    Coronary artery disease involving native coronary artery of native heart without angina pectoris 05/11/2021    S/P angioplasty with stent 05/11/2021    Hx of CABG 05/11/2021    S/P AAA repair 05/11/2021    S/P prostatectomy 05/11/2021    H/O prostate cancer 05/11/2021     No orders of the defined types were placed in this encounter.

## 2025-02-28 ENCOUNTER — OFFICE VISIT (OUTPATIENT)
Dept: WOUND CARE | Facility: HOSPITAL | Age: 80
End: 2025-02-28
Payer: COMMERCIAL

## 2025-02-28 VITALS
SYSTOLIC BLOOD PRESSURE: 140 MMHG | DIASTOLIC BLOOD PRESSURE: 84 MMHG | HEART RATE: 62 BPM | TEMPERATURE: 96.9 F | RESPIRATION RATE: 16 BRPM

## 2025-02-28 DIAGNOSIS — Z79.4 TYPE 2 DIABETES MELLITUS WITH DIABETIC PERIPHERAL ANGIOPATHY WITHOUT GANGRENE, WITH LONG-TERM CURRENT USE OF INSULIN (HCC): ICD-10-CM

## 2025-02-28 DIAGNOSIS — E11.621 DIABETIC ULCER OF TOE OF RIGHT FOOT ASSOCIATED WITH TYPE 2 DIABETES MELLITUS, WITH NECROSIS OF BONE (HCC): Primary | ICD-10-CM

## 2025-02-28 DIAGNOSIS — L97.514 DIABETIC ULCER OF TOE OF RIGHT FOOT ASSOCIATED WITH TYPE 2 DIABETES MELLITUS, WITH NECROSIS OF BONE (HCC): Primary | ICD-10-CM

## 2025-02-28 DIAGNOSIS — E11.51 TYPE 2 DIABETES MELLITUS WITH DIABETIC PERIPHERAL ANGIOPATHY WITHOUT GANGRENE, WITH LONG-TERM CURRENT USE OF INSULIN (HCC): ICD-10-CM

## 2025-02-28 LAB
GLUCOSE SERPL-MCNC: 142 MG/DL (ref 65–140)
GLUCOSE SERPL-MCNC: 188 MG/DL (ref 65–140)

## 2025-02-28 PROCEDURE — 82948 REAGENT STRIP/BLOOD GLUCOSE: CPT | Performed by: STUDENT IN AN ORGANIZED HEALTH CARE EDUCATION/TRAINING PROGRAM

## 2025-02-28 PROCEDURE — G0277 HBOT, FULL BODY CHAMBER, 30M: HCPCS | Performed by: STUDENT IN AN ORGANIZED HEALTH CARE EDUCATION/TRAINING PROGRAM

## 2025-02-28 PROCEDURE — 97597 DBRDMT OPN WND 1ST 20 CM/<: CPT | Performed by: STUDENT IN AN ORGANIZED HEALTH CARE EDUCATION/TRAINING PROGRAM

## 2025-02-28 PROCEDURE — 99183 HYPERBARIC OXYGEN THERAPY: CPT | Performed by: STUDENT IN AN ORGANIZED HEALTH CARE EDUCATION/TRAINING PROGRAM

## 2025-02-28 RX ORDER — LIDOCAINE 40 MG/G
CREAM TOPICAL ONCE
Status: COMPLETED | OUTPATIENT
Start: 2025-02-28 | End: 2025-02-28

## 2025-02-28 RX ADMIN — LIDOCAINE 1 APPLICATION: 40 CREAM TOPICAL at 10:30

## 2025-02-28 NOTE — PATIENT INSTRUCTIONS
Orders Placed This Encounter   Procedures    Wound cleansing and dressings Diabetic Ulcer Right;Posterior Toe D1, great     Right Great Toe Wound:      Wash your hands with soap and water. Remove old dressing, discard into plastic bag and place in trash. Cleanse the wound with normal saline prior to applying a clean dressing. Do not use tissue or cotton balls. Do not scrub the wound. Pat dry using gauze.      Shower no, sponge bath only Discontinue the silver alginate.      Apply acticoat 3 to wound bed. (The acticoat can discolor the wound area to a gray or black)   Cover with gauze   Secure with rolled gauze and tape      Change dressing 3 times weekly      Edema control: Elevate extremity to heart level when sitting. Do not leave dependent.      Offloading: Limit walking to only necessity. Wear surgical shoe to right foot with any walking Wound infection: If you have signs of infection please call the wound center. If the wound center is closedplease go to the Emergency department. Some signs of infection: fever, chills, increased redness, red streaks, increase in pain, increased drainage. Drainage with an odor, Change in drainage color: white/milky/green/tan/yellow, an increase in swelling, chest pain and/or shortness of breath.      Protein: Eat protein with each meal to promote healing. Examples of protein are fish, meat, chicken, nuts, peanut butter, eggs, lentils, edamame or a protein shake.     Standing Status:   Future     Expiration Date:   3/7/2025

## 2025-02-28 NOTE — PROGRESS NOTES
Name: Thomas Horne      : 1945      MRN: [id number]  Encounter Provider: David Frye MD  Encounter Date: 2025   Encounter department: [facility]  :  Assessment & Plan  Diabetic ulcer of toe of right foot associated with type 2 diabetes mellitus, with necrosis of bone (HCC)  It was a pleasure to see Thomas Horne for wound care follow up today  Selective debridement performed today as above  Wound is improving   Continue plan of care as noted below with actacote 3   A1C results reviewed with the patient today.   No signs or symptoms of infection today. Patient understands that if any signs of infection start (such as increased redness, drainage, pain, fever, chills, diaphoresis), they should call our office or proceed to the ER or Urgent Care.  Patient should continue a high protein diet to facilitate wound healing  Patient is advised to not submerge wound or leave wound open to air.  Follow up next week for wound care  Given the multi-factorial nature of wound care, additional time was taken to review patient's treatment plan with other specialties and most recent pertinent lab work and imaging.   All plans of care discussed with patient at bedside who verbalized understanding with treatment plan.      Lab Results   Component Value Date    HGBA1C 6.4 (H) 02/15/2025       Orders:    lidocaine (LMX) 4 % cream    Wound cleansing and dressings Diabetic Ulcer Right;Posterior Toe D1, great; Future    Wound Procedure Treatment Diabetic Ulcer Right;Posterior Toe D1, great    Debridement    Type 2 diabetes mellitus with diabetic peripheral angiopathy without gangrene, with long-term current use of insulin (HCC)    Lab Results   Component Value Date    HGBA1C 6.4 (H) 02/15/2025                History of Present Illness   Chief Complaint   Patient presents with    Follow Up Wound Care Visit     R great toe   Here for wound follow up.  80-year-old male here today for follow-up wound  care of right great toe.  Improved since last exam.  Wound dressings being changed in the office as patient is coming daily for hyperbaric oxygen therapy for treatment of DFU.  Happy with wound healing.  No symptoms of infection.      Objective   /84   Pulse 62   Temp (!) 96.9 °F (36.1 °C)   Resp 16     Physical Exam  Vitals reviewed.   Constitutional:       Appearance: Normal appearance.   HENT:      Head: Normocephalic and atraumatic.   Eyes:      Extraocular Movements: Extraocular movements intact.   Pulmonary:      Effort: Pulmonary effort is normal.   Musculoskeletal:      Cervical back: Neck supple.   Skin:     Comments: Distal right great toe wound measuring smaller than last exam.  Minimal serosanguineous exudate.  Not probing to the bone.  No signs of infection.   Neurological:      Mental Status: He is alert.   Psychiatric:         Mood and Affect: Mood normal.       Wound 11/08/24 Diabetic Ulcer Toe D1, great Right;Posterior (Active)   Enter Sumner score: Sumner Grade 4: Partial-foot gangrene 02/21/25 1040   Wound Image   02/28/25 1026   Wound Description Pink;Yellow 02/28/25 1025   Clara-wound Assessment Dry;Callus 02/28/25 1025   Wound Length (cm) 0.2 cm 02/28/25 1025   Wound Width (cm) 1.1 cm 02/28/25 1025   Wound Depth (cm) 0.2 cm 02/28/25 1025   Wound Surface Area (cm^2) 0.22 cm^2 02/28/25 1025   Wound Volume (cm^3) 0.044 cm^3 02/28/25 1025   Calculated Wound Volume (cm^3) 0.04 cm^3 02/28/25 1025   Change in Wound Size % 33.33 02/28/25 1025   Drainage Amount Scant 02/28/25 1025   Drainage Description Serosanguineous 02/28/25 1025   Non-staged Wound Description Full thickness 02/28/25 1025   Dressing Status Intact 02/28/25 1025       Wound 12/16/24 Incision Chest Left;Upper (Active)       Wound 01/07/25 Catheter entry/exit site Groin Right (Active)       Debridement   Wound 11/08/24 Diabetic Ulcer Toe D1, great Right;Posterior    Universal Protocol:  procedure performed by consultantConsent:  "Verbal consent obtained.  Risks and benefits: risks, benefits and alternatives were discussed  Consent given by: patient  Time out: Immediately prior to procedure a \"time out\" was called to verify the correct patient, procedure, equipment, support staff and site/side marked as required.  Patient identity confirmed: verbally with patient    Debridement Details  Performed by: physician  Debridement type: selective  Pain control: lidocaine 4%      Post-debridement measurements  Length (cm): 0.2  Width (cm): 1.1  Depth (cm): 0.2  Percent debrided: 100%  Surface Area (cm^2): 0.22  Area Debrided (cm^2): 0.22  Volume (cm^3): 0.04    Devitalized tissue debrided: biofilm and exudate  Instrument(s) utilized: curette  Bleeding: small  Hemostasis obtained with: pressure  Procedural pain (0-10): 1  Post-procedural pain: 0   Response to treatment: procedure was tolerated well       Results from last 6 Months   Lab Units 01/03/25  1513   WOUND CULTURE  1+ Growth of Staphylococcus aureus*  1+ Growth of         "

## 2025-02-28 NOTE — PROGRESS NOTES
Wound Procedure Treatment Diabetic Ulcer Right;Posterior Toe D1, great    Performed by: Evelin Ruiz RN  Authorized by: David Frye MD  Associated wounds:   Wound 11/08/24 Diabetic Ulcer Toe D1, great Right;Posterior    Wound cleansed with:  NSS   Applied primary dressing:  Acticoat   Applied secondary dressing:  Gauze   Dressing secured with:  Ender and Tape

## 2025-02-28 NOTE — PROGRESS NOTES
HBO Treatment Course Details       Treatment Notes: Treatment tolerated well.  No complications.     Treatment Course Number: 15  Total Treatments Ordered:  40     Diagnosis:   1. Diabetic ulcer of toe of right foot associated with type 2 diabetes mellitus, with necrosis of bone (HCC)  Hyperbaric oxygen thearpy          HBO Treatment Details:  In-Patient Visit: no  Treatment Length:90 Minutes(Minutes)  Chamber #: Hard sided Monoplace Chamber    Pre-Treatment details:  Pre-treatment protocol Treatment Protocol: 2.0 INDY X 90 minutes w/ 100% oxygen, two 5 minute air breaks  Left ear clear?: yes  Right ear clear?: yes  Left ear intact?: yes  Right ear intact?: yes  Left ear color: difficult to appreciate due to cerumen  Right ear color: difficult to appreciate due to cerumen  PE Tubes present, Left ear?: yes  PE Tubes present, Right ear?: yes  Left ear irrigated?: no  Right ear irrigated?: no  Left ear TEED scale: Grade 0  Right ear TEED scale: Grade 0   Pretreatment heart and lung assessment: Pretreatment heart and lung auscltation unremarkable. Patient cleared for HBOT     Treatment details:  INDY Rate: 2  Started Compression: 0817  Reached Compression: 0823  Total Compression Time: 6 (Minutes)  Total Holding Time: 100 (Minutes)  Started Decompression: 1003  Reached Surface: 1010  Total Decompression Time: 7 (Minutes)  Total Airbreaks:   (Minutes)  Total Time of Treatment: 113 (Minutes)  Symptoms Noted During Treatment: None (Minutes)    Post treatment details:  Left ear clear?: yes  Right ear clear?: yes  Left ears intact?: yes  Right ears intact?: yes  Left ear color: translucent  Right ear color: translucent  PE Tubes present, Left ear?: yes  PE Tubes present, Right ear?: yes        Left ear TEED scale: Grade 0  Right ear TEED scale: Grade 0  Post treatment heart and lung assessment: Post treatment heart and lung auscltation unremarkable. Patient cleared for discharge. Tolerated treatment well.             Vital  Signs:  HBO Glucose Reference Range: 100-350 mg/dl   Pre-Treatment Post-Treatment   Time vitals are taken: 0815 Time vitals are taken: 1012   Blood Pressure: 140/82 Blood Pressure: 140/84   Pulse: 78 Pulse: 62   Resp: 18 Resp: 16   Temp: 96.5 °F (35.8 °C) Temp: 96.9 °F (36.1 °C)   Pre-Inspection Glucose readin Post-Inspection Glucose readin       Allergies   Allergen Reactions    Lisinopril Rash and Lip Swelling     Patient Active Problem List    Diagnosis Date Noted    Constipation 2025    Gram-positive bacteremia 2025    Acute metabolic encephalopathy 2025    Bacteremia 2025    S/P placement of cardiac pacemaker 2024    Bradycardia 2024    Multiple open wounds of lower extremity 2024    Ulcer of right foot (HCC) 2024    SOB (shortness of breath) 2024    Nausea 2024    Elevated troponin 2024    History of DVT (deep vein thrombosis) 2024    Diabetic ulcer of toe of right foot associated with type 2 diabetes mellitus, with fat layer exposed (HCC) 2024    Plantar fasciitis, right 07/10/2024    Acute deep vein thrombosis (DVT) of left peroneal vein (HCC) 2024    Iron deficiency anemia 2024    Shortness of breath 2024    History of colon polyps 2024    Duodenal ulcer 2024    Multiple gastric ulcers 2023    Iron deficiency anemia due to chronic blood loss 2023    Melena 2023    Symptomatic anemia 2023    Frequent PVCs 2023    Chronic diastolic congestive heart failure (HCC) 2023    Acute kidney injury superimposed on stage 3b chronic kidney disease (HCC) 2023    Diabetic ulcer of right midfoot associated with diabetes mellitus due to underlying condition, limited to breakdown of skin (HCC) 06/15/2023    Hypertensive urgency 2023    Elevated troponin level not due myocardial infarction 2023    Stable angina pectoris (HCC) 2023    Chronic kidney  disease-mineral and bone disorder 11/30/2022    Nonrheumatic aortic valve stenosis 11/11/2022    Gross hematuria 07/26/2022    Platelets decreased ([facility]) 07/22/2022    Acute kidney injury superimposed on chronic kidney disease  ([facility]) 02/16/2022    Actinic keratoses 01/14/2022    Type 2 diabetes mellitus with diabetic peripheral angiopathy without gangrene, with long-term current use of insulin ([facility]) 01/11/2022    Varicose veins of left lower extremity 06/25/2021    History of endovascular stent graft for abdominal aortic aneurysm (AAA) 06/25/2021    Bruit (arterial) 06/25/2021    PAD (peripheral artery disease) ([facility]) 06/25/2021    Type 2 diabetes mellitus with diabetic polyneuropathy, with long-term current use of insulin ([facility]) 06/10/2021    Mixed hyperlipidemia 05/11/2021    Primary hypertension 05/11/2021    Coronary artery disease involving native coronary artery of native heart without angina pectoris 05/11/2021    S/P angioplasty with stent 05/11/2021    Hx of CABG 05/11/2021    S/P AAA repair 05/11/2021    S/P prostatectomy 05/11/2021    H/O prostate cancer 05/11/2021     No orders of the defined types were placed in this encounter.

## 2025-02-28 NOTE — PROGRESS NOTES
HBO Treatment Course Details       Treatment Notes: Patient tolerated HBOT well.  He denied any ear pain nor had any complaints.  He has a [facility] for wound care appointment with Dr. Frye post dive.      Treatment Course Number: 15  Total Treatments Ordered:  40     Diagnosis:   1. Diabetic ulcer of toe of right foot associated with type 2 diabetes mellitus, with necrosis of bone (HCC)  Hyperbaric oxygen thearpy          HBO Treatment Details:  In-Patient Visit: no  Treatment Length:90 Minutes(Minutes)  Chamber #: Hard sided Monoplace Chamber    Pre-Treatment details:  Pre-treatment protocol Treatment Protocol: 2.0 INDY X 90 minutes w/ 100% oxygen, two 5 minute air breaks  Left ear clear?: yes  Right ear clear?: yes  Left ear intact?: yes  Right ear intact?: yes  Left ear color: difficult to appreciate due to cerumen  Right ear color: difficult to appreciate due to cerumen  PE Tubes present, Left ear?: yes  PE Tubes present, Right ear?: yes  Left ear irrigated?: no  Right ear irrigated?: no  Left ear TEED scale: Grade 0  Right ear TEED scale: Grade 0   Pretreatment heart and lung assessment: Pretreatment heart and lung auscltation unremarkable. Patient cleared for HBOT     Treatment details:  INDY Rate: 2  Started Compression: 0817  Reached Compression: 0823  Total Compression Time: 6 (Minutes)  Total Holding Time: 100 (Minutes)  Started Decompression: 1003  Reached Surface: 1010  Total Decompression Time: 7 (Minutes)  Total Airbreaks:   (Minutes)  Total Time of Treatment: 113 (Minutes)  Symptoms Noted During Treatment: None (Minutes)    Post treatment details:                                                    Vital Signs:  HBO Glucose Reference Range: 100-350 mg/dl   Pre-Treatment Post-Treatment   Time vitals are taken: 0815 Time vitals are taken: 1012   Blood Pressure: 140/82 Blood Pressure: 140/84   Pulse: 78 Pulse: 62   Resp: 18 Resp: 16   Temp: 96.5 °F (35.8 °C) Temp: 96.9 °F (36.1 °C)   Pre-Inspection Glucose  readin Post-Inspection Glucose readin       Allergies   Allergen Reactions    Lisinopril Rash and Lip Swelling     Patient Active Problem List    Diagnosis Date Noted    Constipation 2025    Gram-positive bacteremia 2025    Acute metabolic encephalopathy 2025    Bacteremia 2025    S/P placement of cardiac pacemaker 2024    Bradycardia 2024    Multiple open wounds of lower extremity 2024    Ulcer of right foot (HCC) 2024    SOB (shortness of breath) 2024    Nausea 2024    Elevated troponin 2024    History of DVT (deep vein thrombosis) 2024    Diabetic ulcer of toe of right foot associated with type 2 diabetes mellitus, with fat layer exposed ([facility]) 2024    Plantar fasciitis, right 07/10/2024    Acute deep vein thrombosis (DVT) of left peroneal vein ([facility]) 2024    Iron deficiency anemia 2024    Shortness of breath 2024    History of colon polyps 2024    Duodenal ulcer 2024    Multiple gastric ulcers 2023    Iron deficiency anemia due to chronic blood loss 2023    Melena 2023    Symptomatic anemia 2023    Frequent PVCs 2023    Chronic diastolic congestive heart failure ([facility]) 2023    Acute kidney injury superimposed on stage 3b chronic kidney disease ([facility]) 2023    Diabetic ulcer of right midfoot associated with diabetes mellitus due to underlying condition, limited to breakdown of skin ([facility]) 06/15/2023    Hypertensive urgency 2023    Elevated troponin level not due myocardial infarction 2023    Stable angina pectoris ([facility]) 2023    Chronic kidney disease-mineral and bone disorder 2022    Nonrheumatic aortic valve stenosis 2022    Gross hematuria 2022    Platelets decreased ([facility]) 2022    Acute kidney injury superimposed on chronic kidney disease  ([facility]) 2022    Actinic keratoses 2022    Type 2 diabetes mellitus  with diabetic peripheral angiopathy without gangrene, with long-term current use of insulin ([facility]) 01/11/2022    Varicose veins of left lower extremity 06/25/2021    History of endovascular stent graft for abdominal aortic aneurysm (AAA) 06/25/2021    Bruit (arterial) 06/25/2021    PAD (peripheral artery disease) ([facility]) 06/25/2021    Type 2 diabetes mellitus with diabetic polyneuropathy, with long-term current use of insulin ([facility]) 06/10/2021    Mixed hyperlipidemia 05/11/2021    Primary hypertension 05/11/2021    Coronary artery disease involving native coronary artery of native heart without angina pectoris 05/11/2021    S/P angioplasty with stent 05/11/2021    Hx of CABG 05/11/2021    S/P AAA repair 05/11/2021    S/P prostatectomy 05/11/2021    H/O prostate cancer 05/11/2021     No orders of the defined types were placed in this encounter.

## 2025-03-03 ENCOUNTER — OFFICE VISIT (OUTPATIENT)
Dept: WOUND CARE | Facility: HOSPITAL | Age: 80
End: 2025-03-03
Payer: COMMERCIAL

## 2025-03-03 DIAGNOSIS — R07.9 CHEST PAIN, UNSPECIFIED TYPE: ICD-10-CM

## 2025-03-03 DIAGNOSIS — L97.514 DIABETIC ULCER OF TOE OF RIGHT FOOT ASSOCIATED WITH TYPE 2 DIABETES MELLITUS, WITH NECROSIS OF BONE (HCC): ICD-10-CM

## 2025-03-03 DIAGNOSIS — E11.621 DIABETIC ULCER OF TOE OF RIGHT FOOT ASSOCIATED WITH TYPE 2 DIABETES MELLITUS, WITH NECROSIS OF BONE (HCC): ICD-10-CM

## 2025-03-03 LAB
GLUCOSE SERPL-MCNC: 166 MG/DL (ref 65–140)
GLUCOSE SERPL-MCNC: 198 MG/DL (ref 65–140)

## 2025-03-03 PROCEDURE — 99183 HYPERBARIC OXYGEN THERAPY: CPT | Performed by: STUDENT IN AN ORGANIZED HEALTH CARE EDUCATION/TRAINING PROGRAM

## 2025-03-03 PROCEDURE — 82948 REAGENT STRIP/BLOOD GLUCOSE: CPT | Performed by: STUDENT IN AN ORGANIZED HEALTH CARE EDUCATION/TRAINING PROGRAM

## 2025-03-03 PROCEDURE — G0277 HBOT, FULL BODY CHAMBER, 30M: HCPCS | Performed by: STUDENT IN AN ORGANIZED HEALTH CARE EDUCATION/TRAINING PROGRAM

## 2025-03-03 RX ORDER — RANOLAZINE 1000 MG/1
1000 TABLET, EXTENDED RELEASE ORAL 2 TIMES DAILY
Qty: 180 TABLET | Refills: 3 | Status: SHIPPED | OUTPATIENT
Start: 2025-03-03 | End: 2025-03-10 | Stop reason: SDUPTHER

## 2025-03-03 NOTE — PROGRESS NOTES
HBO Treatment Course Details       Treatment Notes: Patient tolerated HBOT well.  Wound care completed to right great toe as ordered.      Treatment Course Number: 16  Total Treatments Ordered:  40     Diagnosis:   1. Diabetic ulcer of toe of right foot associated with type 2 diabetes mellitus, with necrosis of bone (HCC)  Hyperbaric oxygen thearpy          HBO Treatment Details:  In-Patient Visit: no  Treatment Length:90 Minutes(Minutes)  Chamber #: Hard sided Monoplace Chamber    Pre-Treatment details:  Pre-treatment protocol Treatment Protocol: 2.0 INDY X 90 minutes w/ 100% oxygen, two 5 minute air breaks                                             Treatment details:  INDY Rate: 2  Started Compression: 0814  Reached Compression: 0821  Total Compression Time: 7 (Minutes)  Total Holding Time: 100 (Minutes)  Started Decompression: 1001  Reached Surface: 1008  Total Decompression Time: 7 (Minutes)  Total Airbreaks:   (Minutes)  Total Time of Treatment: 114 (Minutes)  Symptoms Noted During Treatment: None (Minutes)    Post treatment details:                                                    Vital Signs:  HBO Glucose Reference Range: 100-350 mg/dl   Pre-Treatment Post-Treatment   Time vitals are taken: 0812 Time vitals are taken: 1010   Blood Pressure: 148/80 Blood Pressure: 130/78   Pulse: 58 Pulse: 72   Resp: 18 Resp: 16   Temp: 96.4 °F (35.8 °C) Temp: 96.7 °F (35.9 °C)   Pre-Inspection Glucose readin Post-Inspection Glucose readin       Allergies   Allergen Reactions    Lisinopril Rash and Lip Swelling     Patient Active Problem List    Diagnosis Date Noted    Constipation 2025    Gram-positive bacteremia 2025    Acute metabolic encephalopathy 2025    Bacteremia 2025    S/P placement of cardiac pacemaker 2024    Bradycardia 2024    Multiple open wounds of lower extremity 2024    Ulcer of right foot (HCC) 2024    SOB (shortness of breath) 2024    Nausea  09/06/2024    Elevated troponin 09/06/2024    History of DVT (deep vein thrombosis) 08/26/2024    Diabetic ulcer of toe of right foot associated with type 2 diabetes mellitus, with fat layer exposed ([facility]) 08/08/2024    Plantar fasciitis, right 07/10/2024    Acute deep vein thrombosis (DVT) of left peroneal vein ([facility]) 05/01/2024    Iron deficiency anemia 04/23/2024    Shortness of breath 04/09/2024    History of colon polyps 02/22/2024    Duodenal ulcer 02/01/2024    Multiple gastric ulcers 12/27/2023    Iron deficiency anemia due to chronic blood loss 12/27/2023    Melena 12/26/2023    Symptomatic anemia 12/26/2023    Frequent PVCs 06/17/2023    Chronic diastolic congestive heart failure ([facility]) 06/16/2023    Acute kidney injury superimposed on stage 3b chronic kidney disease ([facility]) 06/16/2023    Diabetic ulcer of right midfoot associated with diabetes mellitus due to underlying condition, limited to breakdown of skin ([facility]) 06/15/2023    Hypertensive urgency 05/11/2023    Elevated troponin level not due myocardial infarction 05/11/2023    Stable angina pectoris ([facility]) 03/07/2023    Chronic kidney disease-mineral and bone disorder 11/30/2022    Nonrheumatic aortic valve stenosis 11/11/2022    Gross hematuria 07/26/2022    Platelets decreased ([facility]) 07/22/2022    Acute kidney injury superimposed on chronic kidney disease  ([facility]) 02/16/2022    Actinic keratoses 01/14/2022    Type 2 diabetes mellitus with diabetic peripheral angiopathy without gangrene, with long-term current use of insulin ([facility]) 01/11/2022    Varicose veins of left lower extremity 06/25/2021    History of endovascular stent graft for abdominal aortic aneurysm (AAA) 06/25/2021    Bruit (arterial) 06/25/2021    PAD (peripheral artery disease) ([facility]) 06/25/2021    Type 2 diabetes mellitus with diabetic polyneuropathy, with long-term current use of insulin ([facility]) 06/10/2021    Mixed hyperlipidemia 05/11/2021    Primary hypertension 05/11/2021    Coronary artery  disease involving native coronary artery of native heart without angina pectoris 05/11/2021    S/P angioplasty with stent 05/11/2021    Hx of CABG 05/11/2021    S/P AAA repair 05/11/2021    S/P prostatectomy 05/11/2021    H/O prostate cancer 05/11/2021     No orders of the defined types were placed in this encounter.

## 2025-03-03 NOTE — PROGRESS NOTES
HBO Treatment Course Details       Treatment Notes: Treatment tolerated well.  No complications     Treatment Course Number: 16  Total Treatments Ordered:  40     Diagnosis:   1. Diabetic ulcer of toe of right foot associated with type 2 diabetes mellitus, with necrosis of bone (HCC)  Hyperbaric oxygen thearpy          HBO Treatment Details:  In-Patient Visit: no  Treatment Length:90 Minutes(Minutes)  Chamber #: Hard sided Monoplace Chamber    Pre-Treatment details:  Pre-treatment protocol Treatment Protocol: 2.0 INDY X 90 minutes w/ 100% oxygen, no air break  Left ear clear?: yes  Right ear clear?: yes  Left ear intact?: yes  Right ear intact?: yes  Left ear color: translucent  Right ear color: translucent  PE Tubes present, Left ear?: yes  PE Tubes present, Right ear?: yes  Left ear irrigated?: no  Right ear irrigated?: no  Left ear TEED scale: Grade 0  Right ear TEED scale: Grade 0   Pretreatment heart and lung assessment: Pretreatment heart and lung auscltation unremarkable. Patient cleared for HBOT     Treatment details:  INDY Rate: 2  Started Compression: 0814  Reached Compression: 0821  Total Compression Time: 7 (Minutes)  Total Holding Time: 100 (Minutes)  Started Decompression: 1001  Reached Surface: 1008  Total Decompression Time: 7 (Minutes)  Total Airbreaks:   (Minutes)  Total Time of Treatment: 114 (Minutes)  Symptoms Noted During Treatment: None (Minutes)    Post treatment details:  Left ear clear?: yes  Right ear clear?: yes  Left ears intact?: yes  Right ears intact?: yes  Left ear color: translucent  Right ear color: translucent  PE Tubes present, Left ear?: yes  PE Tubes present, Right ear?: yes        Left ear TEED scale: Grade 0  Right ear TEED scale: Grade 0  Post treatment heart and lung assessment: Post treatment heart and lung auscltation unremarkable. Patient cleared for discharge. Tolerated treatment well.             Vital Signs:  HBO Glucose Reference Range: 100-350 mg/dl   Pre-Treatment  Post-Treatment   Time vitals are taken: 0812 Time vitals are taken: 1010   Blood Pressure: 148/80 Blood Pressure: 130/78   Pulse: 58 Pulse: 72   Resp: 18 Resp: 16   Temp: 96.4 °F (35.8 °C) Temp: 96.7 °F (35.9 °C)   Pre-Inspection Glucose readin Post-Inspection Glucose readin       Allergies   Allergen Reactions    Lisinopril Rash and Lip Swelling     Patient Active Problem List    Diagnosis Date Noted    Constipation 2025    Gram-positive bacteremia 2025    Acute metabolic encephalopathy 2025    Bacteremia 2025    S/P placement of cardiac pacemaker 2024    Bradycardia 2024    Multiple open wounds of lower extremity 2024    Ulcer of right foot (HCC) 2024    SOB (shortness of breath) 2024    Nausea 2024    Elevated troponin 2024    History of DVT (deep vein thrombosis) 2024    Diabetic ulcer of toe of right foot associated with type 2 diabetes mellitus, with fat layer exposed (HCC) 2024    Plantar fasciitis, right 07/10/2024    Acute deep vein thrombosis (DVT) of left peroneal vein (HCC) 2024    Iron deficiency anemia 2024    Shortness of breath 2024    History of colon polyps 2024    Duodenal ulcer 2024    Multiple gastric ulcers 2023    Iron deficiency anemia due to chronic blood loss 2023    Melena 2023    Symptomatic anemia 2023    Frequent PVCs 2023    Chronic diastolic congestive heart failure (HCC) 2023    Acute kidney injury superimposed on stage 3b chronic kidney disease (HCC) 2023    Diabetic ulcer of right midfoot associated with diabetes mellitus due to underlying condition, limited to breakdown of skin (HCC) 06/15/2023    Hypertensive urgency 2023    Elevated troponin level not due myocardial infarction 2023    Stable angina pectoris (HCC) 2023    Chronic kidney disease-mineral and bone disorder 2022    Nonrheumatic aortic  valve stenosis 11/11/2022    Gross hematuria 07/26/2022    Platelets decreased ([facility]) 07/22/2022    Acute kidney injury superimposed on chronic kidney disease  ([facility]) 02/16/2022    Actinic keratoses 01/14/2022    Type 2 diabetes mellitus with diabetic peripheral angiopathy without gangrene, with long-term current use of insulin ([facility]) 01/11/2022    Varicose veins of left lower extremity 06/25/2021    History of endovascular stent graft for abdominal aortic aneurysm (AAA) 06/25/2021    Bruit (arterial) 06/25/2021    PAD (peripheral artery disease) ([facility]) 06/25/2021    Type 2 diabetes mellitus with diabetic polyneuropathy, with long-term current use of insulin ([facility]) 06/10/2021    Mixed hyperlipidemia 05/11/2021    Primary hypertension 05/11/2021    Coronary artery disease involving native coronary artery of native heart without angina pectoris 05/11/2021    S/P angioplasty with stent 05/11/2021    Hx of CABG 05/11/2021    S/P AAA repair 05/11/2021    S/P prostatectomy 05/11/2021    H/O prostate cancer 05/11/2021     No orders of the defined types were placed in this encounter.

## 2025-03-04 ENCOUNTER — OFFICE VISIT (OUTPATIENT)
Dept: WOUND CARE | Facility: HOSPITAL | Age: 80
End: 2025-03-04
Payer: COMMERCIAL

## 2025-03-04 DIAGNOSIS — Z79.4 TYPE 2 DIABETES MELLITUS WITH DIABETIC PERIPHERAL ANGIOPATHY WITHOUT GANGRENE, WITH LONG-TERM CURRENT USE OF INSULIN (HCC): ICD-10-CM

## 2025-03-04 DIAGNOSIS — E11.51 TYPE 2 DIABETES MELLITUS WITH DIABETIC PERIPHERAL ANGIOPATHY WITHOUT GANGRENE, WITH LONG-TERM CURRENT USE OF INSULIN (HCC): ICD-10-CM

## 2025-03-04 DIAGNOSIS — L97.514 DIABETIC ULCER OF TOE OF RIGHT FOOT ASSOCIATED WITH TYPE 2 DIABETES MELLITUS, WITH NECROSIS OF BONE (HCC): Primary | ICD-10-CM

## 2025-03-04 DIAGNOSIS — E11.621 DIABETIC ULCER OF TOE OF RIGHT FOOT ASSOCIATED WITH TYPE 2 DIABETES MELLITUS, WITH NECROSIS OF BONE (HCC): Primary | ICD-10-CM

## 2025-03-04 LAB
GLUCOSE SERPL-MCNC: 149 MG/DL (ref 65–140)
GLUCOSE SERPL-MCNC: 169 MG/DL (ref 65–140)

## 2025-03-04 PROCEDURE — G0277 HBOT, FULL BODY CHAMBER, 30M: HCPCS | Performed by: STUDENT IN AN ORGANIZED HEALTH CARE EDUCATION/TRAINING PROGRAM

## 2025-03-04 PROCEDURE — 99183 HYPERBARIC OXYGEN THERAPY: CPT | Performed by: STUDENT IN AN ORGANIZED HEALTH CARE EDUCATION/TRAINING PROGRAM

## 2025-03-04 PROCEDURE — 82948 REAGENT STRIP/BLOOD GLUCOSE: CPT | Performed by: STUDENT IN AN ORGANIZED HEALTH CARE EDUCATION/TRAINING PROGRAM

## 2025-03-04 NOTE — PROGRESS NOTES
HBO Treatment Course Details       Treatment Notes: Patient tolerated HBOT well.  No complaints offered.     Treatment Course Number: 17  Total Treatments Ordered:  40     Diagnosis:   1. Diabetic ulcer of toe of right foot associated with type 2 diabetes mellitus, with necrosis of bone (HCC)  Hyperbaric oxygen thearpy      2. Type 2 diabetes mellitus with diabetic peripheral angiopathy without gangrene, with long-term current use of insulin (HCC)            HBO Treatment Details:  In-Patient Visit: no  Treatment Length:90 Minutes(Minutes)  Chamber #: Hard sided Monoplace Chamber    Pre-Treatment details:  Pre-treatment protocol Treatment Protocol: 2.0 INDY X 90 minutes w/ 100% oxygen, two 5 minute air breaks  Left ear clear?: yes  Right ear clear?: yes  Left ear intact?: yes  Right ear intact?: yes  Left ear color: translucent  Right ear color: translucent  PE Tubes present, Left ear?: yes  PE Tubes present, Right ear?: yes  Left ear irrigated?: no  Right ear irrigated?: no  Left ear TEED scale: Grade 0  Right ear TEED scale: Grade 0   Pretreatment heart and lung assessment: Pretreatment heart and lung auscltation unremarkable. Patient cleared for HBOT     Treatment details:  INDY Rate: 2  Started Compression: 0822  Reached Compression: 0828  Total Compression Time: 6 (Minutes)  Total Holding Time: 100 (Minutes)  Started Decompression: 1008  Reached Surface: 1015  Total Decompression Time: 7 (Minutes)  Total Airbreaks:   (Minutes)  Total Time of Treatment: 113 (Minutes)  Symptoms Noted During Treatment: None (Minutes)    Post treatment details:  Left ear clear?: yes  Right ear clear?: yes  Left ears intact?: yes  Right ears intact?: yes  Left ear color: translucent  Right ear color: translucent  PE Tubes present, Left ear?: yes  PE Tubes present, Right ear?: yes        Left ear TEED scale: Grade 0  Right ear TEED scale: Grade 0  Post treatment heart and lung assessment: Post treatment heart and lung auscltation  unremarkable. Patient cleared for discharge. Tolerated treatment well.             Vital Signs:  [facility] Glucose Reference Range: 100-350 mg/dl   Pre-Treatment Post-Treatment   Time vitals are taken: 0815 Time vitals are taken: 1017   Blood Pressure: 140/78 Blood Pressure: 140/68   Pulse: 72 Pulse: 54   Resp: 18 Resp: 16   Temp: 96.8 °F (36 °C) Temp: 96.7 °F (35.9 °C)   Pre-Inspection Glucose readin Post-Inspection Glucose readin       Allergies   Allergen Reactions    Lisinopril Rash and Lip Swelling     Patient Active Problem List    Diagnosis Date Noted    Constipation 2025    Gram-positive bacteremia 2025    Acute metabolic encephalopathy 2025    Bacteremia 2025    S/P placement of cardiac pacemaker 2024    Bradycardia 2024    Multiple open wounds of lower extremity 2024    Ulcer of right foot (HCC) 2024    SOB (shortness of breath) 2024    Nausea 2024    Elevated troponin 2024    History of DVT (deep vein thrombosis) 2024    Diabetic ulcer of toe of right foot associated with type 2 diabetes mellitus, with fat layer exposed (HCC) 2024    Plantar fasciitis, right 07/10/2024    Acute deep vein thrombosis (DVT) of left peroneal vein (HCC) 2024    Iron deficiency anemia 2024    Shortness of breath 2024    History of colon polyps 2024    Duodenal ulcer 2024    Multiple gastric ulcers 2023    Iron deficiency anemia due to chronic blood loss 2023    Melena 2023    Symptomatic anemia 2023    Frequent PVCs 2023    Chronic diastolic congestive heart failure (HCC) 2023    Acute kidney injury superimposed on stage 3b chronic kidney disease (HCC) 2023    Diabetic ulcer of right midfoot associated with diabetes mellitus due to underlying condition, limited to breakdown of skin (HCC) 06/15/2023    Hypertensive urgency 2023    Elevated troponin level not due  myocardial infarction 05/11/2023    Stable angina pectoris ([facility]) 03/07/2023    Chronic kidney disease-mineral and bone disorder 11/30/2022    Nonrheumatic aortic valve stenosis 11/11/2022    Gross hematuria 07/26/2022    Platelets decreased ([facility]) 07/22/2022    Acute kidney injury superimposed on chronic kidney disease  ([facility]) 02/16/2022    Actinic keratoses 01/14/2022    Type 2 diabetes mellitus with diabetic peripheral angiopathy without gangrene, with long-term current use of insulin ([facility]) 01/11/2022    Varicose veins of left lower extremity 06/25/2021    History of endovascular stent graft for abdominal aortic aneurysm (AAA) 06/25/2021    Bruit (arterial) 06/25/2021    PAD (peripheral artery disease) ([facility]) 06/25/2021    Type 2 diabetes mellitus with diabetic polyneuropathy, with long-term current use of insulin ([facility]) 06/10/2021    Mixed hyperlipidemia 05/11/2021    Primary hypertension 05/11/2021    Coronary artery disease involving native coronary artery of native heart without angina pectoris 05/11/2021    S/P angioplasty with stent 05/11/2021    Hx of CABG 05/11/2021    S/P AAA repair 05/11/2021    S/P prostatectomy 05/11/2021    H/O prostate cancer 05/11/2021     No orders of the defined types were placed in this encounter.

## 2025-03-04 NOTE — PROGRESS NOTES
HBO Treatment Course Details       Treatment Notes: Treatment tolerated well.  No complications.     Treatment Course Number: 17  Total Treatments Ordered:  40     Diagnosis:   1. Diabetic ulcer of toe of right foot associated with type 2 diabetes mellitus, with necrosis of bone (HCC)  Hyperbaric oxygen thearpy      2. Type 2 diabetes mellitus with diabetic peripheral angiopathy without gangrene, with long-term current use of insulin (HCC)            HBO Treatment Details:  In-Patient Visit: no  Treatment Length:90 Minutes(Minutes)  Chamber #: Hard sided Monoplace Chamber    Pre-Treatment details:  Pre-treatment protocol Treatment Protocol: 2.0 INDY X 90 minutes w/ 100% oxygen, two 5 minute air breaks  Left ear clear?: yes  Right ear clear?: yes  Left ear intact?: yes  Right ear intact?: yes  Left ear color: translucent  Right ear color: translucent  PE Tubes present, Left ear?: yes  PE Tubes present, Right ear?: yes  Left ear irrigated?: no  Right ear irrigated?: no  Left ear TEED scale: Grade 0  Right ear TEED scale: Grade 0   Pretreatment heart and lung assessment: Pretreatment heart and lung auscltation unremarkable. Patient cleared for HBOT     Treatment details:  INDY Rate: 2  Started Compression: 0822  Reached Compression: 0828  Total Compression Time: 6 (Minutes)  Total Holding Time: 100 (Minutes)  Started Decompression: 1008  Reached Surface: 1015  Total Decompression Time: 7 (Minutes)  Total Airbreaks:   (Minutes)  Total Time of Treatment: 113 (Minutes)  Symptoms Noted During Treatment: None (Minutes)    Post treatment details:  Left ear clear?: yes  Right ear clear?: yes  Left ears intact?: yes  Right ears intact?: yes  Left ear color: translucent  Right ear color: translucent  PE Tubes present, Left ear?: yes  PE Tubes present, Right ear?: yes        Left ear TEED scale: Grade 0  Right ear TEED scale: Grade 0  Post treatment heart and lung assessment: Post treatment heart and lung auscltation unremarkable.  Patient cleared for discharge. Tolerated treatment well.             Vital Signs:  [facility] Glucose Reference Range: 100-350 mg/dl   Pre-Treatment Post-Treatment   Time vitals are taken: 0815 Time vitals are taken: 1017   Blood Pressure: 140/78 Blood Pressure: 140/68   Pulse: 72 Pulse: 54   Resp: 18 Resp: 16   Temp: 96.8 °F (36 °C) Temp: 96.7 °F (35.9 °C)   Pre-Inspection Glucose readin Post-Inspection Glucose readin       Allergies   Allergen Reactions    Lisinopril Rash and Lip Swelling     Patient Active Problem List    Diagnosis Date Noted    Constipation 2025    Gram-positive bacteremia 2025    Acute metabolic encephalopathy 2025    Bacteremia 2025    S/P placement of cardiac pacemaker 2024    Bradycardia 2024    Multiple open wounds of lower extremity 2024    Ulcer of right foot (HCC) 2024    SOB (shortness of breath) 2024    Nausea 2024    Elevated troponin 2024    History of DVT (deep vein thrombosis) 2024    Diabetic ulcer of toe of right foot associated with type 2 diabetes mellitus, with fat layer exposed (HCC) 2024    Plantar fasciitis, right 07/10/2024    Acute deep vein thrombosis (DVT) of left peroneal vein (HCC) 2024    Iron deficiency anemia 2024    Shortness of breath 2024    History of colon polyps 2024    Duodenal ulcer 2024    Multiple gastric ulcers 2023    Iron deficiency anemia due to chronic blood loss 2023    Melena 2023    Symptomatic anemia 2023    Frequent PVCs 2023    Chronic diastolic congestive heart failure (HCC) 2023    Acute kidney injury superimposed on stage 3b chronic kidney disease (HCC) 2023    Diabetic ulcer of right midfoot associated with diabetes mellitus due to underlying condition, limited to breakdown of skin (HCC) 06/15/2023    Hypertensive urgency 2023    Elevated troponin level not due myocardial infarction  05/11/2023    Stable angina pectoris ([facility]) 03/07/2023    Chronic kidney disease-mineral and bone disorder 11/30/2022    Nonrheumatic aortic valve stenosis 11/11/2022    Gross hematuria 07/26/2022    Platelets decreased ([facility]) 07/22/2022    Acute kidney injury superimposed on chronic kidney disease  ([facility]) 02/16/2022    Actinic keratoses 01/14/2022    Type 2 diabetes mellitus with diabetic peripheral angiopathy without gangrene, with long-term current use of insulin ([facility]) 01/11/2022    Varicose veins of left lower extremity 06/25/2021    History of endovascular stent graft for abdominal aortic aneurysm (AAA) 06/25/2021    Bruit (arterial) 06/25/2021    PAD (peripheral artery disease) ([facility]) 06/25/2021    Type 2 diabetes mellitus with diabetic polyneuropathy, with long-term current use of insulin ([facility]) 06/10/2021    Mixed hyperlipidemia 05/11/2021    Primary hypertension 05/11/2021    Coronary artery disease involving native coronary artery of native heart without angina pectoris 05/11/2021    S/P angioplasty with stent 05/11/2021    Hx of CABG 05/11/2021    S/P AAA repair 05/11/2021    S/P prostatectomy 05/11/2021    H/O prostate cancer 05/11/2021     No orders of the defined types were placed in this encounter.

## 2025-03-05 ENCOUNTER — OFFICE VISIT (OUTPATIENT)
Dept: WOUND CARE | Facility: HOSPITAL | Age: 80
End: 2025-03-05
Payer: COMMERCIAL

## 2025-03-05 DIAGNOSIS — L97.514 DIABETIC ULCER OF TOE OF RIGHT FOOT ASSOCIATED WITH TYPE 2 DIABETES MELLITUS, WITH NECROSIS OF BONE (HCC): ICD-10-CM

## 2025-03-05 DIAGNOSIS — E11.621 DIABETIC ULCER OF TOE OF RIGHT FOOT ASSOCIATED WITH TYPE 2 DIABETES MELLITUS, WITH NECROSIS OF BONE (HCC): ICD-10-CM

## 2025-03-05 LAB — GLUCOSE SERPL-MCNC: 155 MG/DL (ref 65–140)

## 2025-03-05 PROCEDURE — G0277 HBOT, FULL BODY CHAMBER, 30M: HCPCS | Performed by: STUDENT IN AN ORGANIZED HEALTH CARE EDUCATION/TRAINING PROGRAM

## 2025-03-05 PROCEDURE — 82948 REAGENT STRIP/BLOOD GLUCOSE: CPT | Performed by: STUDENT IN AN ORGANIZED HEALTH CARE EDUCATION/TRAINING PROGRAM

## 2025-03-05 PROCEDURE — 99183 HYPERBARIC OXYGEN THERAPY: CPT | Performed by: STUDENT IN AN ORGANIZED HEALTH CARE EDUCATION/TRAINING PROGRAM

## 2025-03-05 NOTE — PROGRESS NOTES
HBO Treatment Course Details       Treatment Notes: Treatment tolerated well.  No complications     Treatment Course Number: 18  Total Treatments Ordered:  40     Diagnosis:   1. Diabetic ulcer of toe of right foot associated with type 2 diabetes mellitus, with necrosis of bone (HCC)  Hyperbaric oxygen thearpy          HBO Treatment Details:  In-Patient Visit: no  Treatment Length:90 Minutes(Minutes)  Chamber #: Hard sided Monoplace Chamber    Pre-Treatment details:  Pre-treatment protocol Treatment Protocol: 2.0 INDY X 90 minutes w/ 100% oxygen, no air break  Left ear clear?: yes  Right ear clear?: yes  Left ear intact?: yes  Right ear intact?: yes  Left ear color: translucent  Right ear color: translucent  PE Tubes present, Left ear?: yes  PE Tubes present, Right ear?: yes  Left ear irrigated?: yes  Right ear irrigated?: yes  Left ear TEED scale: Grade 0  Right ear TEED scale: Grade 0   Pretreatment heart and lung assessment: Pretreatment heart and lung auscltation unremarkable. Patient cleared for HBOT     Treatment details:  INDY Rate: 2  Started Compression: 0818  Reached Compression: 0825  Total Compression Time: 7 (Minutes)  Total Holding Time: 100 (Minutes)  Started Decompression: 1005  Reached Surface: 1013  Total Decompression Time: 8 (Minutes)  Total Airbreaks:   (Minutes)  Total Time of Treatment: 115 (Minutes)  Symptoms Noted During Treatment: None (Minutes)    Post treatment details:  Left ear clear?: yes  Right ear clear?: yes  Left ears intact?: yes  Right ears intact?: yes  Left ear color: translucent  Right ear color: translucent  PE Tubes present, Left ear?: yes  PE Tubes present, Right ear?: yes        Left ear TEED scale: Grade 0  Right ear TEED scale: Grade 0  Post treatment heart and lung assessment: Post treatment heart and lung auscltation unremarkable. Patient cleared for discharge. Tolerated treatment well.             Vital Signs:  HBO Glucose Reference Range: 100-350 mg/dl   Pre-Treatment  Post-Treatment   Time vitals are taken: 0815 Time vitals are taken: 1015   Blood Pressure: 116/58 Blood Pressure: 122/62   Pulse: 72 Pulse: 70     Resp: 15   Temp: 96.2 °F (35.7 °C) Temp: 96.7 °F (35.9 °C)   Pre-Inspection Glucose readin Post-Inspection Glucose readin       Allergies   Allergen Reactions    Lisinopril Rash and Lip Swelling     Patient Active Problem List    Diagnosis Date Noted    Constipation 2025    Gram-positive bacteremia 2025    Acute metabolic encephalopathy 2025    Bacteremia 2025    S/P placement of cardiac pacemaker 2024    Bradycardia 2024    Multiple open wounds of lower extremity 2024    Ulcer of right foot (HCC) 2024    SOB (shortness of breath) 2024    Nausea 2024    Elevated troponin 2024    History of DVT (deep vein thrombosis) 2024    Diabetic ulcer of toe of right foot associated with type 2 diabetes mellitus, with fat layer exposed (HCC) 2024    Plantar fasciitis, right 07/10/2024    Acute deep vein thrombosis (DVT) of left peroneal vein (HCC) 2024    Iron deficiency anemia 2024    Shortness of breath 2024    History of colon polyps 2024    Duodenal ulcer 2024    Multiple gastric ulcers 2023    Iron deficiency anemia due to chronic blood loss 2023    Melena 2023    Symptomatic anemia 2023    Frequent PVCs 2023    Chronic diastolic congestive heart failure (HCC) 2023    Acute kidney injury superimposed on stage 3b chronic kidney disease (HCC) 2023    Diabetic ulcer of right midfoot associated with diabetes mellitus due to underlying condition, limited to breakdown of skin (HCC) 06/15/2023    Hypertensive urgency 2023    Elevated troponin level not due myocardial infarction 2023    Stable angina pectoris (HCC) 2023    Chronic kidney disease-mineral and bone disorder 2022    Nonrheumatic aortic valve  stenosis 11/11/2022    Gross hematuria 07/26/2022    Platelets decreased ([facility]) 07/22/2022    Acute kidney injury superimposed on chronic kidney disease  ([facility]) 02/16/2022    Actinic keratoses 01/14/2022    Type 2 diabetes mellitus with diabetic peripheral angiopathy without gangrene, with long-term current use of insulin ([facility]) 01/11/2022    Varicose veins of left lower extremity 06/25/2021    History of endovascular stent graft for abdominal aortic aneurysm (AAA) 06/25/2021    Bruit (arterial) 06/25/2021    PAD (peripheral artery disease) ([facility]) 06/25/2021    Type 2 diabetes mellitus with diabetic polyneuropathy, with long-term current use of insulin ([facility]) 06/10/2021    Mixed hyperlipidemia 05/11/2021    Primary hypertension 05/11/2021    Coronary artery disease involving native coronary artery of native heart without angina pectoris 05/11/2021    S/P angioplasty with stent 05/11/2021    Hx of CABG 05/11/2021    S/P AAA repair 05/11/2021    S/P prostatectomy 05/11/2021    H/O prostate cancer 05/11/2021     No orders of the defined types were placed in this encounter.

## 2025-03-05 NOTE — PROGRESS NOTES
HBO Treatment Course Details       Treatment Notes: Patient tolerated HBOT well.  Wound care to right great toe cleansed with NSS applied acticoat 3 and gauze and christiano and tape following dive.      Treatment Course Number: 18  Total Treatments Ordered:  40     Diagnosis:   1. Diabetic ulcer of toe of right foot associated with type 2 diabetes mellitus, with necrosis of bone (HCC)  Hyperbaric oxygen thearpy          HBO Treatment Details:  In-Patient Visit: no  Treatment Length:90 Minutes(Minutes)  Chamber #: Hard sided Monoplace Chamber    Pre-Treatment details:  Pre-treatment protocol Treatment Protocol: 2.0 INDY X 90 minutes w/ 100% oxygen, no air break  Left ear clear?: yes  Right ear clear?: yes  Left ear intact?: yes  Right ear intact?: yes  Left ear color: translucent  Right ear color: translucent  PE Tubes present, Left ear?: yes  PE Tubes present, Right ear?: yes  Left ear irrigated?: yes  Right ear irrigated?: yes  Left ear TEED scale: Grade 0  Right ear TEED scale: Grade 0   Pretreatment heart and lung assessment: Pretreatment heart and lung auscltation unremarkable. Patient cleared for HBOT     Treatment details:  INDY Rate: 2  Started Compression: 0818  Reached Compression: 0825  Total Compression Time: 7 (Minutes)  Total Holding Time: 100 (Minutes)  Started Decompression: 1005  Reached Surface: 1013  Total Decompression Time: 8 (Minutes)  Total Airbreaks:   (Minutes)  Total Time of Treatment: 115 (Minutes)  Symptoms Noted During Treatment: None (Minutes)    Post treatment details:  Left ear clear?: yes  Right ear clear?: yes  Left ears intact?: yes  Right ears intact?: yes  Left ear color: translucent  Right ear color: translucent  PE Tubes present, Left ear?: yes  PE Tubes present, Right ear?: yes        Left ear TEED scale: Grade 0  Right ear TEED scale: Grade 0  Post treatment heart and lung assessment: Post treatment heart and lung auscltation unremarkable. Patient cleared for discharge. Tolerated  treatment well.             Vital Signs:  [facility] Glucose Reference Range: 100-350 mg/dl   Pre-Treatment Post-Treatment   Time vitals are taken: 0815 Time vitals are taken: 1015   Blood Pressure: 116/58 Blood Pressure: 122/62   Pulse: 72 Pulse: 70     Resp: 15   Temp: 96.2 °F (35.7 °C) Temp: 96.7 °F (35.9 °C)   Pre-Inspection Glucose readin Post-Inspection Glucose readin       Allergies   Allergen Reactions    Lisinopril Rash and Lip Swelling     Patient Active Problem List    Diagnosis Date Noted    Constipation 2025    Gram-positive bacteremia 2025    Acute metabolic encephalopathy 2025    Bacteremia 2025    S/P placement of cardiac pacemaker 2024    Bradycardia 2024    Multiple open wounds of lower extremity 2024    Ulcer of right foot (HCC) 2024    SOB (shortness of breath) 2024    Nausea 2024    Elevated troponin 2024    History of DVT (deep vein thrombosis) 2024    Diabetic ulcer of toe of right foot associated with type 2 diabetes mellitus, with fat layer exposed (HCC) 2024    Plantar fasciitis, right 07/10/2024    Acute deep vein thrombosis (DVT) of left peroneal vein (HCC) 2024    Iron deficiency anemia 2024    Shortness of breath 2024    History of colon polyps 2024    Duodenal ulcer 2024    Multiple gastric ulcers 2023    Iron deficiency anemia due to chronic blood loss 2023    Melena 2023    Symptomatic anemia 2023    Frequent PVCs 2023    Chronic diastolic congestive heart failure (HCC) 2023    Acute kidney injury superimposed on stage 3b chronic kidney disease (HCC) 2023    Diabetic ulcer of right midfoot associated with diabetes mellitus due to underlying condition, limited to breakdown of skin (HCC) 06/15/2023    Hypertensive urgency 2023    Elevated troponin level not due myocardial infarction 2023    Stable angina pectoris ([facility])  03/07/2023    Chronic kidney disease-mineral and bone disorder 11/30/2022    Nonrheumatic aortic valve stenosis 11/11/2022    Gross hematuria 07/26/2022    Platelets decreased ([facility]) 07/22/2022    Acute kidney injury superimposed on chronic kidney disease  ([facility]) 02/16/2022    Actinic keratoses 01/14/2022    Type 2 diabetes mellitus with diabetic peripheral angiopathy without gangrene, with long-term current use of insulin ([facility]) 01/11/2022    Varicose veins of left lower extremity 06/25/2021    History of endovascular stent graft for abdominal aortic aneurysm (AAA) 06/25/2021    Bruit (arterial) 06/25/2021    PAD (peripheral artery disease) ([facility]) 06/25/2021    Type 2 diabetes mellitus with diabetic polyneuropathy, with long-term current use of insulin ([facility]) 06/10/2021    Mixed hyperlipidemia 05/11/2021    Primary hypertension 05/11/2021    Coronary artery disease involving native coronary artery of native heart without angina pectoris 05/11/2021    S/P angioplasty with stent 05/11/2021    Hx of CABG 05/11/2021    S/P AAA repair 05/11/2021    S/P prostatectomy 05/11/2021    H/O prostate cancer 05/11/2021     No orders of the defined types were placed in this encounter.

## 2025-03-06 ENCOUNTER — OFFICE VISIT (OUTPATIENT)
Dept: OTOLARYNGOLOGY | Facility: CLINIC | Age: 80
End: 2025-03-06
Payer: COMMERCIAL

## 2025-03-06 ENCOUNTER — OFFICE VISIT (OUTPATIENT)
Dept: WOUND CARE | Facility: HOSPITAL | Age: 80
End: 2025-03-06
Payer: COMMERCIAL

## 2025-03-06 VITALS
DIASTOLIC BLOOD PRESSURE: 78 MMHG | RESPIRATION RATE: 18 BRPM | SYSTOLIC BLOOD PRESSURE: 142 MMHG | TEMPERATURE: 96.5 F | HEART RATE: 62 BPM

## 2025-03-06 VITALS — HEIGHT: 74 IN | TEMPERATURE: 98.3 F | WEIGHT: 220 LBS | BODY MASS INDEX: 28.23 KG/M2

## 2025-03-06 DIAGNOSIS — Z96.22 HISTORY OF TYMPANOSTOMY TUBE PLACEMENT: Primary | ICD-10-CM

## 2025-03-06 DIAGNOSIS — Z92.89 HISTORY OF HYPERBARIC OXYGEN THERAPY: ICD-10-CM

## 2025-03-06 DIAGNOSIS — E11.621 DIABETIC ULCER OF TOE OF RIGHT FOOT ASSOCIATED WITH TYPE 2 DIABETES MELLITUS, WITH NECROSIS OF BONE (HCC): ICD-10-CM

## 2025-03-06 DIAGNOSIS — L97.514 DIABETIC ULCER OF TOE OF RIGHT FOOT ASSOCIATED WITH TYPE 2 DIABETES MELLITUS, WITH NECROSIS OF BONE (HCC): ICD-10-CM

## 2025-03-06 LAB
GLUCOSE SERPL-MCNC: 160 MG/DL (ref 65–140)
GLUCOSE SERPL-MCNC: 165 MG/DL (ref 65–140)
GLUCOSE SERPL-MCNC: 192 MG/DL (ref 65–140)

## 2025-03-06 PROCEDURE — 99213 OFFICE O/P EST LOW 20 MIN: CPT | Performed by: OTOLARYNGOLOGY

## 2025-03-06 PROCEDURE — G0277 HBOT, FULL BODY CHAMBER, 30M: HCPCS | Performed by: STUDENT IN AN ORGANIZED HEALTH CARE EDUCATION/TRAINING PROGRAM

## 2025-03-06 PROCEDURE — 99183 HYPERBARIC OXYGEN THERAPY: CPT | Performed by: STUDENT IN AN ORGANIZED HEALTH CARE EDUCATION/TRAINING PROGRAM

## 2025-03-06 PROCEDURE — 82948 REAGENT STRIP/BLOOD GLUCOSE: CPT | Performed by: STUDENT IN AN ORGANIZED HEALTH CARE EDUCATION/TRAINING PROGRAM

## 2025-03-06 NOTE — PROGRESS NOTES
"Assessment/Plan:  Both PE tubes in place and patent.  Pt tolerating HBO well.  F/u in 6 months.      Diagnosis ICD-10-CM Associated Orders   1. History of tympanostomy tube placement  Z96.22       2. History of hyperbaric oxygen therapy  Z92.89              Subjective:      Patient ID: Thomas Horne is a 80 y.o. male.    F/u for [employer].  Pt is doing well in HBO.        The following portions of the patient's history were reviewed and updated as appropriate: allergies, current medications, past family history, past medical history, past social history, past surgical history and problem list.    Review of Systems      Objective:      Temp 98.3 °F (36.8 °C) (Temporal)   Ht 6' 2\" (1.88 m)   Wt 99.8 kg (220 lb)   BMI 28.25 kg/m²          Physical Exam  Constitutional:       Appearance: He is well-developed.   HENT:      Head: Normocephalic and atraumatic.      Right Ear: Tympanic membrane, ear canal and external ear normal. No drainage. No middle ear effusion. A PE tube is present.      Left Ear: Tympanic membrane, ear canal and external ear normal. No drainage.  No middle ear effusion. A PE tube is present.      Nose: Nose normal.      Mouth/Throat:      Pharynx: Uvula midline. No oropharyngeal exudate.      Tonsils: 2+ on the right. 2+ on the left.   Neck:      Thyroid: No thyroid mass or thyromegaly.      Trachea: Trachea normal. No tracheal deviation.   Lymphadenopathy:      Cervical: No cervical adenopathy.   Neurological:      Mental Status: He is alert.         "

## 2025-03-06 NOTE — PROGRESS NOTES
HBO Treatment Course Details       Treatment Notes: Treatment tolerated well.  No complications.     Treatment Course Number: 19  Total Treatments Ordered:  40     Diagnosis:   1. Diabetic ulcer of toe of right foot associated with type 2 diabetes mellitus, with necrosis of bone (HCC)  Hyperbaric oxygen thearpy          HBO Treatment Details:  In-Patient Visit: no  Treatment Length:90 Minutes(Minutes)  Chamber #: Hard sided Monoplace Chamber    Pre-Treatment details:  Pre-treatment protocol Treatment Protocol: 2.0 INDY X 90 minutes w/ 100% oxygen, two 5 minute air breaks  Left ear clear?: yes  Right ear clear?: yes  Left ear intact?: yes  Right ear intact?: yes  Left ear color: translucent  Right ear color: translucent  PE Tubes present, Left ear?: yes  PE Tubes present, Right ear?: yes  Left ear irrigated?: no  Right ear irrigated?: no  Left ear TEED scale: Grade 0  Right ear TEED scale: Grade 0   Pretreatment heart and lung assessment: Pretreatment heart and lung auscltation unremarkable. Patient cleared for HBOT     Treatment details:  INDY Rate: 2  Started Compression: 0828  Reached Compression: 0835  Total Compression Time: 7 (Minutes)  Total Holding Time: 100 (Minutes)  Started Decompression: 1015  Reached Surface: 1022  Total Decompression Time: 7 (Minutes)  Total Airbreaks:   (Minutes)  Total Time of Treatment: 114 (Minutes)  Symptoms Noted During Treatment: None (Minutes)    Post treatment details:  Left ear clear?: yes  Right ear clear?: yes  Left ears intact?: yes  Right ears intact?: yes  Left ear color: translucent  Right ear color: translucent  PE Tubes present, Left ear?: yes (difficult to visualize)  PE Tubes present, Right ear?: yes        Left ear TEED scale: Grade 0  Right ear TEED scale: Grade 0  Post treatment heart and lung assessment: Post treatment heart and lung auscltation unremarkable. Patient cleared for discharge. Tolerated treatment well.             Vital Signs:  HBO Glucose Reference  Range: 100-350 mg/dl   Pre-Treatment Post-Treatment   Time vitals are taken: 0820 Time vitals are taken: 1025   Blood Pressure: 142/78 Blood Pressure: 138/78   Pulse: 62 Pulse: 68   Resp: 18 Resp: 18   Temp: 96.5 °F (35.8 °C) Temp: 96.8 °F (36 °C)   Pre-Inspection Glucose readin Post-Inspection Glucose reading: 160       Allergies   Allergen Reactions   • Lisinopril Rash and Lip Swelling     Patient Active Problem List    Diagnosis Date Noted   • Constipation 2025   • Gram-positive bacteremia 2025   • Acute metabolic encephalopathy 2025   • Bacteremia 2025   • S/P placement of cardiac pacemaker 2024   • Bradycardia 2024   • Multiple open wounds of lower extremity 2024   • Ulcer of right foot (HCC) 2024   • SOB (shortness of breath) 2024   • Nausea 2024   • Elevated troponin 2024   • History of DVT (deep vein thrombosis) 2024   • Diabetic ulcer of toe of right foot associated with type 2 diabetes mellitus, with fat layer exposed ([facility]) 2024   • Plantar fasciitis, right 07/10/2024   • Acute deep vein thrombosis (DVT) of left peroneal vein ([facility]) 2024   • Iron deficiency anemia 2024   • Shortness of breath 2024   • History of colon polyps 2024   • Duodenal ulcer 2024   • Multiple gastric ulcers 2023   • Iron deficiency anemia due to chronic blood loss 2023   • Melena 2023   • Symptomatic anemia 2023   • Frequent PVCs 2023   • Chronic diastolic congestive heart failure ([facility]) 2023   • Acute kidney injury superimposed on stage 3b chronic kidney disease ([facility]) 2023   • Diabetic ulcer of right midfoot associated with diabetes mellitus due to underlying condition, limited to breakdown of skin ([facility]) 06/15/2023   • Hypertensive urgency 2023   • Elevated troponin level not due myocardial infarction 2023   • Stable angina pectoris ([facility]) 2023   • Chronic kidney  disease-mineral and bone disorder 11/30/2022   • Nonrheumatic aortic valve stenosis 11/11/2022   • Gross hematuria 07/26/2022   • Platelets decreased ([facility]) 07/22/2022   • Acute kidney injury superimposed on chronic kidney disease  ([facility]) 02/16/2022   • Actinic keratoses 01/14/2022   • Type 2 diabetes mellitus with diabetic peripheral angiopathy without gangrene, with long-term current use of insulin ([facility]) 01/11/2022   • Varicose veins of left lower extremity 06/25/2021   • History of endovascular stent graft for abdominal aortic aneurysm (AAA) 06/25/2021   • Bruit (arterial) 06/25/2021   • PAD (peripheral artery disease) ([facility]) 06/25/2021   • Type 2 diabetes mellitus with diabetic polyneuropathy, with long-term current use of insulin ([facility]) 06/10/2021   • Mixed hyperlipidemia 05/11/2021   • Primary hypertension 05/11/2021   • Coronary artery disease involving native coronary artery of native heart without angina pectoris 05/11/2021   • S/P angioplasty with stent 05/11/2021   • Hx of CABG 05/11/2021   • S/P AAA repair 05/11/2021   • S/P prostatectomy 05/11/2021   • H/O prostate cancer 05/11/2021     No orders of the defined types were placed in this encounter.

## 2025-03-06 NOTE — PROGRESS NOTES
HBO Treatment Course Details       Treatment Notes: Patient tolerated procedure well.  No complaints     Treatment Course Number: 19  Total Treatments Ordered:  40     Diagnosis:   1. Diabetic ulcer of toe of right foot associated with type 2 diabetes mellitus, with necrosis of bone (HCC)  Hyperbaric oxygen thearpy          HBO Treatment Details:  In-Patient Visit: no  Treatment Length:90 Minutes(Minutes)  Chamber #: Hard sided Monoplace Chamber    Pre-Treatment details:  Pre-treatment protocol Treatment Protocol: 2.0 INDY X 90 minutes w/ 100% oxygen, two 5 minute air breaks  Left ear clear?: yes  Right ear clear?: yes  Left ear intact?: yes  Right ear intact?: yes  Left ear color: translucent  Right ear color: translucent  PE Tubes present, Left ear?: yes  PE Tubes present, Right ear?: yes  Left ear irrigated?: no  Right ear irrigated?: no  Left ear TEED scale: Grade 0  Right ear TEED scale: Grade 0   Pretreatment heart and lung assessment: Pretreatment heart and lung auscltation unremarkable. Patient cleared for HBOT     Treatment details:  INDY Rate: 2  Started Compression: 0828  Reached Compression: 0835  Total Compression Time: 7 (Minutes)  Total Holding Time: 100 (Minutes)  Started Decompression: 1015  Reached Surface: 1022  Total Decompression Time: 7 (Minutes)  Total Airbreaks:   (Minutes)  Total Time of Treatment: 114 (Minutes)  Symptoms Noted During Treatment: None (Minutes)    Post treatment details:                                                    Vital Signs:  HBO Glucose Reference Range: 100-350 mg/dl   Pre-Treatment Post-Treatment   Time vitals are taken: 0820 Time vitals are taken: 1025   Blood Pressure: 142/78 Blood Pressure: 138/78   Pulse: 62 Pulse: 68   Resp: 18 Resp: 18   Temp: 96.5 °F (35.8 °C) Temp: 96.8 °F (36 °C)   Pre-Inspection Glucose readin Post-Inspection Glucose reading: 160       Allergies   Allergen Reactions    Lisinopril Rash and Lip Swelling     Patient Active Problem List     Diagnosis Date Noted    Constipation 02/21/2025    Gram-positive bacteremia 01/05/2025    Acute metabolic encephalopathy 01/05/2025    Bacteremia 01/04/2025    S/P placement of cardiac pacemaker 12/20/2024    Bradycardia 12/12/2024    Multiple open wounds of lower extremity 12/12/2024    Ulcer of right foot (HCC) 09/07/2024    SOB (shortness of breath) 09/06/2024    Nausea 09/06/2024    Elevated troponin 09/06/2024    History of DVT (deep vein thrombosis) 08/26/2024    Diabetic ulcer of toe of right foot associated with type 2 diabetes mellitus, with fat layer exposed ([facility]) 08/08/2024    Plantar fasciitis, right 07/10/2024    Acute deep vein thrombosis (DVT) of left peroneal vein ([facility]) 05/01/2024    Iron deficiency anemia 04/23/2024    Shortness of breath 04/09/2024    History of colon polyps 02/22/2024    Duodenal ulcer 02/01/2024    Multiple gastric ulcers 12/27/2023    Iron deficiency anemia due to chronic blood loss 12/27/2023    Melena 12/26/2023    Symptomatic anemia 12/26/2023    Frequent PVCs 06/17/2023    Chronic diastolic congestive heart failure ([facility]) 06/16/2023    Acute kidney injury superimposed on stage 3b chronic kidney disease ([facility]) 06/16/2023    Diabetic ulcer of right midfoot associated with diabetes mellitus due to underlying condition, limited to breakdown of skin ([facility]) 06/15/2023    Hypertensive urgency 05/11/2023    Elevated troponin level not due myocardial infarction 05/11/2023    Stable angina pectoris ([facility]) 03/07/2023    Chronic kidney disease-mineral and bone disorder 11/30/2022    Nonrheumatic aortic valve stenosis 11/11/2022    Gross hematuria 07/26/2022    Platelets decreased ([facility]) 07/22/2022    Acute kidney injury superimposed on chronic kidney disease  ([facility]) 02/16/2022    Actinic keratoses 01/14/2022    Type 2 diabetes mellitus with diabetic peripheral angiopathy without gangrene, with long-term current use of insulin ([facility]) 01/11/2022    Varicose veins of left lower extremity  06/25/2021    History of endovascular stent graft for abdominal aortic aneurysm (AAA) 06/25/2021    Bruit (arterial) 06/25/2021    PAD (peripheral artery disease) ([facility]) 06/25/2021    Type 2 diabetes mellitus with diabetic polyneuropathy, with long-term current use of insulin ([facility]) 06/10/2021    Mixed hyperlipidemia 05/11/2021    Primary hypertension 05/11/2021    Coronary artery disease involving native coronary artery of native heart without angina pectoris 05/11/2021    S/P angioplasty with stent 05/11/2021    Hx of CABG 05/11/2021    S/P AAA repair 05/11/2021    S/P prostatectomy 05/11/2021    H/O prostate cancer 05/11/2021     No orders of the defined types were placed in this encounter.

## 2025-03-07 ENCOUNTER — OFFICE VISIT (OUTPATIENT)
Dept: WOUND CARE | Facility: HOSPITAL | Age: 80
End: 2025-03-07
Payer: COMMERCIAL

## 2025-03-07 ENCOUNTER — TELEPHONE (OUTPATIENT)
Age: 80
End: 2025-03-07

## 2025-03-07 VITALS
TEMPERATURE: 97.6 F | RESPIRATION RATE: 16 BRPM | DIASTOLIC BLOOD PRESSURE: 84 MMHG | SYSTOLIC BLOOD PRESSURE: 158 MMHG | HEART RATE: 72 BPM

## 2025-03-07 DIAGNOSIS — K26.9 DUODENAL ULCER: ICD-10-CM

## 2025-03-07 DIAGNOSIS — N18.9 ACUTE KIDNEY INJURY SUPERIMPOSED ON CHRONIC KIDNEY DISEASE  (HCC): ICD-10-CM

## 2025-03-07 DIAGNOSIS — I50.32 CHRONIC HEART FAILURE WITH PRESERVED EJECTION FRACTION (HFPEF) (HCC): ICD-10-CM

## 2025-03-07 DIAGNOSIS — N17.9 ACUTE KIDNEY INJURY SUPERIMPOSED ON CHRONIC KIDNEY DISEASE  (HCC): ICD-10-CM

## 2025-03-07 DIAGNOSIS — L97.514 DIABETIC ULCER OF TOE OF RIGHT FOOT ASSOCIATED WITH TYPE 2 DIABETES MELLITUS, WITH NECROSIS OF BONE (HCC): Primary | ICD-10-CM

## 2025-03-07 DIAGNOSIS — Z79.4 TYPE 2 DIABETES MELLITUS WITH DIABETIC PERIPHERAL ANGIOPATHY WITHOUT GANGRENE, WITH LONG-TERM CURRENT USE OF INSULIN (HCC): ICD-10-CM

## 2025-03-07 DIAGNOSIS — E11.51 TYPE 2 DIABETES MELLITUS WITH DIABETIC PERIPHERAL ANGIOPATHY WITHOUT GANGRENE, WITH LONG-TERM CURRENT USE OF INSULIN (HCC): ICD-10-CM

## 2025-03-07 DIAGNOSIS — E11.621 DIABETIC ULCER OF TOE OF RIGHT FOOT ASSOCIATED WITH TYPE 2 DIABETES MELLITUS, WITH NECROSIS OF BONE (HCC): Primary | ICD-10-CM

## 2025-03-07 LAB
GLUCOSE SERPL-MCNC: 153 MG/DL (ref 65–140)
GLUCOSE SERPL-MCNC: 156 MG/DL (ref 65–140)

## 2025-03-07 PROCEDURE — 97597 DBRDMT OPN WND 1ST 20 CM/<: CPT | Performed by: STUDENT IN AN ORGANIZED HEALTH CARE EDUCATION/TRAINING PROGRAM

## 2025-03-07 PROCEDURE — 99183 HYPERBARIC OXYGEN THERAPY: CPT | Performed by: STUDENT IN AN ORGANIZED HEALTH CARE EDUCATION/TRAINING PROGRAM

## 2025-03-07 PROCEDURE — G0277 HBOT, FULL BODY CHAMBER, 30M: HCPCS | Performed by: STUDENT IN AN ORGANIZED HEALTH CARE EDUCATION/TRAINING PROGRAM

## 2025-03-07 PROCEDURE — 82948 REAGENT STRIP/BLOOD GLUCOSE: CPT | Performed by: STUDENT IN AN ORGANIZED HEALTH CARE EDUCATION/TRAINING PROGRAM

## 2025-03-07 RX ORDER — LIDOCAINE 40 MG/G
CREAM TOPICAL ONCE
Status: COMPLETED | OUTPATIENT
Start: 2025-03-07 | End: 2025-03-07

## 2025-03-07 RX ORDER — METOPROLOL SUCCINATE 100 MG/1
100 TABLET, EXTENDED RELEASE ORAL DAILY
Qty: 90 TABLET | Refills: 1 | Status: SHIPPED | OUTPATIENT
Start: 2025-03-07

## 2025-03-07 RX ADMIN — LIDOCAINE: 40 CREAM TOPICAL at 10:32

## 2025-03-07 NOTE — PATIENT INSTRUCTIONS
Orders Placed This Encounter   Procedures    Wound cleansing and dressings Diabetic Ulcer Right;Posterior Toe D1, great     Right Great Toe Wound:      Wash your hands with soap and water. Remove old dressing, discard into plastic bag and place in trash. Cleanse the wound with normal saline prior to applying a clean dressing. Do not use tissue or cotton balls. Do not scrub the wound. Pat dry using gauze.      Shower no, sponge bath only.  Apply Woun'dres to wound bed.   Cover with gauze   Secure with rolled gauze.  Change dressing daily Monday through Friday.     Edema control: Elevate extremity to heart level when sitting. Do not leave dependent.      Offloading: Limit walking to only necessity. Wear surgical shoe to right foot with any walking Wound infection: If you have signs of infection please call the wound center. If the wound center is closedplease go to the Emergency department. Some signs of infection: fever, chills, increased redness, red streaks, increase in pain, increased drainage. Drainage with an odor, Change in drainage color: white/milky/green/tan/yellow, an increase in swelling, chest pain and/or shortness of breath.      Protein: Eat protein with each meal to promote healing. Examples of protein are fish, meat, chicken, nuts, peanut butter, eggs, lentils, edamame or a protein shake.     Standing Status:   Future     Expiration Date:   3/14/2025    Wound Procedure Treatment Diabetic Ulcer Right;Posterior Toe D1, great     This order was created via procedure documentation

## 2025-03-07 NOTE — PROGRESS NOTES
Name: Thomas Horne      : 1945      MRN: [id number]  Encounter Provider: David Frye MD  Encounter Date: 3/7/2025   Encounter department: [facility] WOUND CARE  :  Assessment & Plan  Diabetic ulcer of toe of right foot associated with type 2 diabetes mellitus, with necrosis of bone (HCC)  It was a pleasure to see Thomas Horne for wound care follow up today  Selective debridement performed today as above  Wound is improving   Continue plan of care as noted below with change to woun'dres   A1C results reviewed with the patient today.   No signs or symptoms of infection today. Patient understands that if any signs of infection start (such as increased redness, drainage, pain, fever, chills, diaphoresis), they should call our office or proceed to the ER or Urgent Care.  Patient should continue a high protein diet to facilitate wound healing  Patient is advised to not submerge wound or leave wound open to air.  Follow up in 1 weeks.  Continue treatment with hyperbaric oxygen therapy.  Given the multi-factorial nature of wound care, additional time was taken to review patient's treatment plan with other specialties and most recent pertinent lab work and imaging.   All plans of care discussed with patient at bedside who verbalized understanding with treatment plan.    Lab Results   Component Value Date    HGBA1C 6.4 (H) 02/15/2025       Orders:    lidocaine (LMX) 4 % cream    Wound cleansing and dressings Diabetic Ulcer Right;Posterior Toe D1, great; Future    Wound Procedure Treatment Diabetic Ulcer Right;Posterior Toe D1, great    Debridement Diabetic Ulcer Right;Posterior Toe D1, great    Type 2 diabetes mellitus with diabetic peripheral angiopathy without gangrene, with long-term current use of insulin (HCC)    Lab Results   Component Value Date    HGBA1C 6.4 (H) 02/15/2025     Orders:    lidocaine (LMX) 4 % cream    Wound cleansing and dressings Diabetic Ulcer Right;Posterior Toe D1,  great; Future    Wound Procedure Treatment Diabetic Ulcer Right;Posterior Toe D1, great        History of Present Illness   Chief Complaint   Patient presents with    Follow Up Wound Care Visit     Right toe   Here for wound follow up.  80-year-old male here today for follow-up care of Sumner 4 diabetic foot wound of right great toe.  Currently undergoing hyperbaric therapy treatment for this DFU.  Wound is improved today.      Objective   /84   Pulse 72   Temp 97.6 °F (36.4 °C)   Resp 16     Physical Exam  Vitals reviewed.   Constitutional:       Appearance: Normal appearance.   HENT:      Head: Normocephalic and atraumatic.   Eyes:      Extraocular Movements: Extraocular movements intact.   Pulmonary:      Effort: Pulmonary effort is normal.   Musculoskeletal:      Cervical back: Neck supple.   Skin:     Comments: Right great toe wound improved since last exam.  Measuring with less length and width today.  Dry wound bed.  No signs of infection   Neurological:      Mental Status: He is alert.   Psychiatric:         Mood and Affect: Mood normal.       Wound 11/08/24 Diabetic Ulcer Toe D1, great Right;Posterior (Active)   Enter Sumner score: Sumner Grade 4: Partial-foot gangrene 03/07/25 1029   Wound Image   03/07/25 1045   Wound Description Pink;Yellow 03/07/25 1029   Clara-wound Assessment Dry;Callus 03/07/25 1029   Wound Length (cm) 0.2 cm 03/07/25 1029   Wound Width (cm) 0.9 cm 03/07/25 1029   Wound Depth (cm) 0.2 cm 03/07/25 1029   Wound Surface Area (cm^2) 0.18 cm^2 03/07/25 1029   Wound Volume (cm^3) 0.036 cm^3 03/07/25 1029   Calculated Wound Volume (cm^3) 0.04 cm^3 03/07/25 1029   Change in Wound Size % 33.33 03/07/25 1029   Drainage Amount Scant 03/07/25 1029   Drainage Description Serous 03/07/25 1029   Non-staged Wound Description Full thickness 03/07/25 1029   Dressing Status Intact 03/07/25 1029       Wound 12/16/24 Incision Chest Left;Upper (Active)       Wound 01/07/25 Catheter entry/exit site  "Groin Right (Active)       Debridement   Wound 11/08/24 Diabetic Ulcer Toe D1, great Right;Posterior    Universal Protocol:  procedure performed by consultantConsent: Verbal consent obtained.  Risks and benefits: risks, benefits and alternatives were discussed  Consent given by: patient  Time out: Immediately prior to procedure a \"time out\" was called to verify the correct patient, procedure, equipment, support staff and site/side marked as required.  Patient identity confirmed: verbally with patient    Debridement Details  Performed by: physician  Debridement type: selective  Pain control: lidocaine 4%      Post-debridement measurements  Length (cm): 0.2  Width (cm): 0.9  Depth (cm): 0.3  Percent debrided: 100%  Surface Area (cm^2): 0.18  Area Debrided (cm^2): 0.18  Volume (cm^3): 0.05    Devitalized tissue debrided: biofilm and exudate  Instrument(s) utilized: curette  Bleeding: small  Hemostasis obtained with: pressure  Procedural pain (0-10): 1  Post-procedural pain: 0   Response to treatment: procedure was tolerated well       Results from last 6 Months   Lab Units 01/03/25  1513   WOUND CULTURE  1+ Growth of Staphylococcus aureus*  1+ Growth of           --  David Frye MD    \"This note has been constructed using a voice recognition system. Therefore there may be syntax, spelling, and/or grammatical errors. Occasional wrong word or \"sound alike\" substitutions may have occurred due to the inherent limitations of voice recognition software. Read the chart carefully and recognize, using context, where substitutions have occurred. Please call if you have any questions.\"     "

## 2025-03-07 NOTE — PROGRESS NOTES
Wound Procedure Treatment Diabetic Ulcer Right;Posterior Toe D1, great    Performed by: Graciela Philip RN  Authorized by: David Frye MD  Associated wounds:   Wound 11/08/24 Diabetic Ulcer Toe D1, great Right;Posterior    Wound cleansed with:  NSS   Applied to periwound:  Moisture lotion   Applied topical:  Woun'Dres   Applied secondary dressing:  Gauze   Dressing secured with:  Ender and Tape

## 2025-03-07 NOTE — ASSESSMENT & PLAN NOTE
Lab Results   Component Value Date    HGBA1C 6.4 (H) 02/15/2025     Orders:    lidocaine (LMX) 4 % cream    Wound cleansing and dressings Diabetic Ulcer Right;Posterior Toe D1, great; Future    Wound Procedure Treatment Diabetic Ulcer Right;Posterior Toe D1, great

## 2025-03-07 NOTE — PROGRESS NOTES
HBO Treatment Course Details       Treatment Notes: Patient tolerated HBOT well.  He has a wound care appointment with Dr. Frye following his HBOT today.       Treatment Course Number: 20  Total Treatments Ordered:  40     Diagnosis:   1. Diabetic ulcer of toe of right foot associated with type 2 diabetes mellitus, with necrosis of bone (HCC)  Hyperbaric oxygen thearpy          HBO Treatment Details:  In-Patient Visit: no  Treatment Length:90 Minutes(Minutes)  Chamber #: Hard sided Monoplace Chamber    Pre-Treatment details:  Pre-treatment protocol Treatment Protocol: 2.0 INDY X 90 minutes w/ 100% oxygen, two 5 minute air breaks  Left ear clear?: yes  Right ear clear?: yes  Left ear intact?: yes  Right ear intact?: yes  Left ear color: translucent  Right ear color: translucent  PE Tubes present, Left ear?: yes  PE Tubes present, Right ear?: yes  Left ear irrigated?: no  Right ear irrigated?: no  Left ear TEED scale: Grade 0  Right ear TEED scale: Grade 0   Pretreatment heart and lung assessment: Pretreatment heart and lung auscltation unremarkable. Patient cleared for HBOT     Treatment details:  INDY Rate: 2  Started Compression: 0814  Reached Compression: 0821  Total Compression Time: 7 (Minutes)  Total Holding Time: 98 (Minutes)  Started Decompression: 0959  Reached Surface: 1006  Total Decompression Time: 7 (Minutes)  Total Airbreaks:   (Minutes)  Total Time of Treatment: 112 (Minutes)  Symptoms Noted During Treatment: None (Minutes)    Post treatment details:                                                    Vital Signs:  HBO Glucose Reference Range: 100-350 mg/dl   Pre-Treatment Post-Treatment   Time vitals are taken: 0810 Time vitals are taken: 1010   Blood Pressure: 138/88 Blood Pressure: 158/84   Pulse: 82 Pulse: 72   Resp: 18 Resp: 16   Temp: 97.3 °F (36.3 °C) Temp: 96.8 °F (36 °C)   Pre-Inspection Glucose readin Post-Inspection Glucose readin       Allergies   Allergen Reactions     Lisinopril Rash and Lip Swelling     Patient Active Problem List    Diagnosis Date Noted    Constipation 02/21/2025    Gram-positive bacteremia 01/05/2025    Acute metabolic encephalopathy 01/05/2025    Bacteremia 01/04/2025    S/P placement of cardiac pacemaker 12/20/2024    Bradycardia 12/12/2024    Multiple open wounds of lower extremity 12/12/2024    Ulcer of right foot (HCC) 09/07/2024    SOB (shortness of breath) 09/06/2024    Nausea 09/06/2024    Elevated troponin 09/06/2024    History of DVT (deep vein thrombosis) 08/26/2024    Diabetic ulcer of toe of right foot associated with type 2 diabetes mellitus, with fat layer exposed ([facility]) 08/08/2024    Plantar fasciitis, right 07/10/2024    Acute deep vein thrombosis (DVT) of left peroneal vein ([facility]) 05/01/2024    Iron deficiency anemia 04/23/2024    Shortness of breath 04/09/2024    History of colon polyps 02/22/2024    Duodenal ulcer 02/01/2024    Multiple gastric ulcers 12/27/2023    Iron deficiency anemia due to chronic blood loss 12/27/2023    Melena 12/26/2023    Symptomatic anemia 12/26/2023    Frequent PVCs 06/17/2023    Chronic diastolic congestive heart failure (HCC) 06/16/2023    Acute kidney injury superimposed on stage 3b chronic kidney disease ([facility]) 06/16/2023    Diabetic ulcer of right midfoot associated with diabetes mellitus due to underlying condition, limited to breakdown of skin ([facility]) 06/15/2023    Hypertensive urgency 05/11/2023    Elevated troponin level not due myocardial infarction 05/11/2023    Stable angina pectoris ([facility]) 03/07/2023    Chronic kidney disease-mineral and bone disorder 11/30/2022    Nonrheumatic aortic valve stenosis 11/11/2022    Gross hematuria 07/26/2022    Platelets decreased ([facility]) 07/22/2022    Acute kidney injury superimposed on chronic kidney disease  ([facility]) 02/16/2022    Actinic keratoses 01/14/2022    Type 2 diabetes mellitus with diabetic peripheral angiopathy without gangrene, with long-term current use of insulin  ([facility]) 01/11/2022    Varicose veins of left lower extremity 06/25/2021    History of endovascular stent graft for abdominal aortic aneurysm (AAA) 06/25/2021    Bruit (arterial) 06/25/2021    PAD (peripheral artery disease) ([facility]) 06/25/2021    Type 2 diabetes mellitus with diabetic polyneuropathy, with long-term current use of insulin ([facility]) 06/10/2021    Mixed hyperlipidemia 05/11/2021    Primary hypertension 05/11/2021    Coronary artery disease involving native coronary artery of native heart without angina pectoris 05/11/2021    S/P angioplasty with stent 05/11/2021    Hx of CABG 05/11/2021    S/P AAA repair 05/11/2021    S/P prostatectomy 05/11/2021    H/O prostate cancer 05/11/2021     No orders of the defined types were placed in this encounter.

## 2025-03-07 NOTE — TELEPHONE ENCOUNTER
Patient called the RX Refill Line. Message is being forwarded to the office.     Patient is requesting - Pt called and requested to ask Dr. Arango if he should continue taking DULoxetine  30 mg and pregabalin 150 mg. The last medication per Pt used to be prescribed by a pain med provider that he no longer sees. Pt states that if he should still continue taking these meds he needs a refill and if not to please call him to let him know. Please review, thank you.    Please contact patient at - [phone]

## 2025-03-07 NOTE — PROGRESS NOTES
HBO Treatment Course Details       Treatment Notes: Treatment tolerated well.  No complications.     Treatment Course Number: 20  Total Treatments Ordered:  40     Diagnosis:   1. Diabetic ulcer of toe of right foot associated with type 2 diabetes mellitus, with necrosis of bone (HCC)  Hyperbaric oxygen thearpy          HBO Treatment Details:  In-Patient Visit: no  Treatment Length:90 Minutes(Minutes)  Chamber #: Hard sided Monoplace Chamber    Pre-Treatment details:  Pre-treatment protocol Treatment Protocol: 2.0 INDY X 90 minutes w/ 100% oxygen, two 5 minute air breaks  Left ear clear?: yes  Right ear clear?: yes  Left ear intact?: yes  Right ear intact?: yes  Left ear color: translucent  Right ear color: translucent  PE Tubes present, Left ear?: yes  PE Tubes present, Right ear?: yes  Left ear irrigated?: no  Right ear irrigated?: no  Left ear TEED scale: Grade 0  Right ear TEED scale: Grade 0   Pretreatment heart and lung assessment: Pretreatment heart and lung auscltation unremarkable. Patient cleared for HBOT     Treatment details:  INDY Rate: 2  Started Compression: 0814  Reached Compression: 0821  Total Compression Time: 7 (Minutes)  Total Holding Time: 98 (Minutes)  Started Decompression: 0959  Reached Surface: 1006  Total Decompression Time: 7 (Minutes)  Total Airbreaks:   (Minutes)  Total Time of Treatment: 112 (Minutes)  Symptoms Noted During Treatment: None (Minutes)    Post treatment details:  Left ear clear?: yes  Right ear clear?: yes  Left ears intact?: yes  Right ears intact?: yes  Left ear color: translucent  Right ear color: translucent  PE Tubes present, Left ear?: yes  PE Tubes present, Right ear?: yes        Left ear TEED scale: Grade 0  Right ear TEED scale: Grade 0  Post treatment heart and lung assessment: Post treatment heart and lung auscltation unremarkable. Patient cleared for discharge. Tolerated treatment well.             Vital Signs:  HBO Glucose Reference Range: 100-350 mg/dl    Pre-Treatment Post-Treatment   Time vitals are taken: 0810 Time vitals are taken: 1010   Blood Pressure: 138/88 Blood Pressure: 158/84   Pulse: 82 Pulse: 72   Resp: 18 Resp: 16   Temp: 97.3 °F (36.3 °C) Temp: 96.8 °F (36 °C)   Pre-Inspection Glucose readin Post-Inspection Glucose readin       Allergies   Allergen Reactions   • Lisinopril Rash and Lip Swelling     Patient Active Problem List    Diagnosis Date Noted   • Constipation 2025   • Gram-positive bacteremia 2025   • Acute metabolic encephalopathy 2025   • Bacteremia 2025   • S/P placement of cardiac pacemaker 2024   • Bradycardia 2024   • Multiple open wounds of lower extremity 2024   • Ulcer of right foot ([facility]) 2024   • SOB (shortness of breath) 2024   • Nausea 2024   • Elevated troponin 2024   • History of DVT (deep vein thrombosis) 2024   • Diabetic ulcer of toe of right foot associated with type 2 diabetes mellitus, with fat layer exposed ([facility]) 2024   • Plantar fasciitis, right 07/10/2024   • Acute deep vein thrombosis (DVT) of left peroneal vein ([facility]) 2024   • Iron deficiency anemia 2024   • Shortness of breath 2024   • History of colon polyps 2024   • Duodenal ulcer 2024   • Multiple gastric ulcers 2023   • Iron deficiency anemia due to chronic blood loss 2023   • Melena 2023   • Symptomatic anemia 2023   • Frequent PVCs 2023   • Chronic diastolic congestive heart failure ([facility]) 2023   • Acute kidney injury superimposed on stage 3b chronic kidney disease ([facility]) 2023   • Diabetic ulcer of right midfoot associated with diabetes mellitus due to underlying condition, limited to breakdown of skin ([facility]) 06/15/2023   • Hypertensive urgency 2023   • Elevated troponin level not due myocardial infarction 2023   • Stable angina pectoris ([facility]) 2023   • Chronic kidney disease-mineral and  bone disorder 11/30/2022   • Nonrheumatic aortic valve stenosis 11/11/2022   • Gross hematuria 07/26/2022   • Platelets decreased ([facility]) 07/22/2022   • Acute kidney injury superimposed on chronic kidney disease  ([facility]) 02/16/2022   • Actinic keratoses 01/14/2022   • Type 2 diabetes mellitus with diabetic peripheral angiopathy without gangrene, with long-term current use of insulin ([facility]) 01/11/2022   • Varicose veins of left lower extremity 06/25/2021   • History of endovascular stent graft for abdominal aortic aneurysm (AAA) 06/25/2021   • Bruit (arterial) 06/25/2021   • PAD (peripheral artery disease) ([facility]) 06/25/2021   • Type 2 diabetes mellitus with diabetic polyneuropathy, with long-term current use of insulin ([facility]) 06/10/2021   • Mixed hyperlipidemia 05/11/2021   • Primary hypertension 05/11/2021   • Coronary artery disease involving native coronary artery of native heart without angina pectoris 05/11/2021   • S/P angioplasty with stent 05/11/2021   • Hx of CABG 05/11/2021   • S/P AAA repair 05/11/2021   • S/P prostatectomy 05/11/2021   • H/O prostate cancer 05/11/2021     No orders of the defined types were placed in this encounter.

## 2025-03-10 ENCOUNTER — OFFICE VISIT (OUTPATIENT)
Dept: WOUND CARE | Facility: HOSPITAL | Age: 80
End: 2025-03-10
Payer: COMMERCIAL

## 2025-03-10 DIAGNOSIS — L97.514 DIABETIC ULCER OF TOE OF RIGHT FOOT ASSOCIATED WITH TYPE 2 DIABETES MELLITUS, WITH NECROSIS OF BONE (HCC): Primary | ICD-10-CM

## 2025-03-10 DIAGNOSIS — L97.411 DIABETIC ULCER OF RIGHT MIDFOOT ASSOCIATED WITH DIABETES MELLITUS DUE TO UNDERLYING CONDITION, LIMITED TO BREAKDOWN OF SKIN (HCC): ICD-10-CM

## 2025-03-10 DIAGNOSIS — E08.621 DIABETIC ULCER OF RIGHT MIDFOOT ASSOCIATED WITH DIABETES MELLITUS DUE TO UNDERLYING CONDITION, LIMITED TO BREAKDOWN OF SKIN (HCC): ICD-10-CM

## 2025-03-10 DIAGNOSIS — M77.41 METATARSALGIA OF BOTH FEET: ICD-10-CM

## 2025-03-10 DIAGNOSIS — M54.16 RADICULOPATHY OF LUMBAR REGION: ICD-10-CM

## 2025-03-10 DIAGNOSIS — M77.42 METATARSALGIA OF BOTH FEET: ICD-10-CM

## 2025-03-10 DIAGNOSIS — E11.621 DIABETIC ULCER OF TOE OF RIGHT FOOT ASSOCIATED WITH TYPE 2 DIABETES MELLITUS, WITH NECROSIS OF BONE (HCC): Primary | ICD-10-CM

## 2025-03-10 DIAGNOSIS — E11.42 DIABETIC POLYNEUROPATHY ASSOCIATED WITH TYPE 2 DIABETES MELLITUS (HCC): ICD-10-CM

## 2025-03-10 DIAGNOSIS — I12.9 BENIGN HYPERTENSION WITH CKD (CHRONIC KIDNEY DISEASE) STAGE III (HCC): ICD-10-CM

## 2025-03-10 DIAGNOSIS — N18.30 BENIGN HYPERTENSION WITH CKD (CHRONIC KIDNEY DISEASE) STAGE III (HCC): ICD-10-CM

## 2025-03-10 DIAGNOSIS — L97.521 DIABETIC ULCER OF TOE OF LEFT FOOT ASSOCIATED WITH TYPE 2 DIABETES MELLITUS, LIMITED TO BREAKDOWN OF SKIN (HCC): ICD-10-CM

## 2025-03-10 DIAGNOSIS — I70.209 PERIPHERAL ARTERIOSCLEROSIS (HCC): ICD-10-CM

## 2025-03-10 DIAGNOSIS — E11.621 DIABETIC ULCER OF TOE OF LEFT FOOT ASSOCIATED WITH TYPE 2 DIABETES MELLITUS, LIMITED TO BREAKDOWN OF SKIN (HCC): ICD-10-CM

## 2025-03-10 DIAGNOSIS — L97.421 DIABETIC ULCER OF LEFT HEEL ASSOCIATED WITH TYPE 2 DIABETES MELLITUS, LIMITED TO BREAKDOWN OF SKIN (HCC): ICD-10-CM

## 2025-03-10 DIAGNOSIS — R07.9 CHEST PAIN, UNSPECIFIED TYPE: ICD-10-CM

## 2025-03-10 DIAGNOSIS — E11.621 DIABETIC ULCER OF LEFT HEEL ASSOCIATED WITH TYPE 2 DIABETES MELLITUS, LIMITED TO BREAKDOWN OF SKIN (HCC): ICD-10-CM

## 2025-03-10 LAB
GLUCOSE SERPL-MCNC: 163 MG/DL (ref 65–140)
GLUCOSE SERPL-MCNC: 182 MG/DL (ref 65–140)

## 2025-03-10 PROCEDURE — G0277 HBOT, FULL BODY CHAMBER, 30M: HCPCS | Performed by: STUDENT IN AN ORGANIZED HEALTH CARE EDUCATION/TRAINING PROGRAM

## 2025-03-10 PROCEDURE — 99183 HYPERBARIC OXYGEN THERAPY: CPT | Performed by: STUDENT IN AN ORGANIZED HEALTH CARE EDUCATION/TRAINING PROGRAM

## 2025-03-10 PROCEDURE — 82948 REAGENT STRIP/BLOOD GLUCOSE: CPT | Performed by: STUDENT IN AN ORGANIZED HEALTH CARE EDUCATION/TRAINING PROGRAM

## 2025-03-10 RX ORDER — PREGABALIN 150 MG/1
150 CAPSULE ORAL 3 TIMES DAILY
Qty: 270 CAPSULE | Refills: 0 | Status: SHIPPED | OUTPATIENT
Start: 2025-03-10 | End: 2025-06-08

## 2025-03-10 RX ORDER — DULOXETIN HYDROCHLORIDE 30 MG/1
30 CAPSULE, DELAYED RELEASE ORAL DAILY
Qty: 90 CAPSULE | Refills: 0 | Status: SHIPPED | OUTPATIENT
Start: 2025-03-10

## 2025-03-10 NOTE — PROGRESS NOTES
HBO Treatment Course Details       Treatment Notes: Treatment tolerated well.  No complications.     Treatment Course Number: 21  Total Treatments Ordered:  40     Diagnosis:   1. Diabetic ulcer of toe of right foot associated with type 2 diabetes mellitus, with necrosis of bone (HCC)  Hyperbaric oxygen thearpy          HBO Treatment Details:  In-Patient Visit: no  Treatment Length:90 Minutes(Minutes)  Chamber #: Hard sided Monoplace Chamber    Pre-Treatment details:  Pre-treatment protocol Treatment Protocol: 2.0 INDY X 90 minutes w/ 100% oxygen, two 5 minute air breaks  Left ear clear?: yes  Right ear clear?: yes  Left ear intact?: yes  Right ear intact?: yes  Left ear color: translucent  Right ear color: translucent  PE Tubes present, Left ear?: yes  PE Tubes present, Right ear?: yes  Left ear irrigated?: no  Right ear irrigated?: no  Left ear TEED scale: Grade 0  Right ear TEED scale: Grade 0   Pretreatment heart and lung assessment: Pretreatment heart and lung auscltation unremarkable. Patient cleared for HBOT     Treatment details:  INDY Rate: 2  Started Compression: 0816  Reached Compression: 0823  Total Compression Time: 7 (Minutes)  Total Holding Time: 100 (Minutes)  Started Decompression: 1003  Reached Surface: 1010  Total Decompression Time: 7 (Minutes)  Total Airbreaks:   (Minutes)  Total Time of Treatment: 114 (Minutes)  Symptoms Noted During Treatment: None (Minutes)    Post treatment details:  Left ear clear?: yes  Right ear clear?: yes  Left ears intact?: yes  Right ears intact?: yes  Left ear color: translucent  Right ear color: translucent  PE Tubes present, Left ear?: yes  PE Tubes present, Right ear?: yes        Left ear TEED scale: Grade 0  Right ear TEED scale: Grade 0  Post treatment heart and lung assessment: Post treatment heart and lung auscltation unremarkable. Patient cleared for discharge. Tolerated treatment well.             Vital Signs:  HBO Glucose Reference Range: 100-350 mg/dl    Pre-Treatment Post-Treatment   Time vitals are taken: 0810 Time vitals are taken: 1011   Blood Pressure: 132/74 Blood Pressure: 118/70   Pulse: 74 Pulse: 74   Resp: 18 Resp: 16   Temp: 96.9 °F (36.1 °C) Temp: 96.5 °F (35.8 °C)   Pre-Inspection Glucose readin Post-Inspection Glucose readin       Allergies   Allergen Reactions    Lisinopril Rash and Lip Swelling     Patient Active Problem List    Diagnosis Date Noted    Constipation 2025    Gram-positive bacteremia 2025    Acute metabolic encephalopathy 2025    Bacteremia 2025    S/P placement of cardiac pacemaker 2024    Bradycardia 2024    Multiple open wounds of lower extremity 2024    Ulcer of right foot (HCC) 2024    SOB (shortness of breath) 2024    Nausea 2024    Elevated troponin 2024    History of DVT (deep vein thrombosis) 2024    Diabetic ulcer of toe of right foot associated with type 2 diabetes mellitus, with fat layer exposed (HCC) 2024    Plantar fasciitis, right 07/10/2024    Acute deep vein thrombosis (DVT) of left peroneal vein (HCC) 2024    Iron deficiency anemia 2024    Shortness of breath 2024    History of colon polyps 2024    Duodenal ulcer 2024    Multiple gastric ulcers 2023    Iron deficiency anemia due to chronic blood loss 2023    Melena 2023    Symptomatic anemia 2023    Frequent PVCs 2023    Chronic diastolic congestive heart failure (HCC) 2023    Acute kidney injury superimposed on stage 3b chronic kidney disease (HCC) 2023    Diabetic ulcer of right midfoot associated with diabetes mellitus due to underlying condition, limited to breakdown of skin (HCC) 06/15/2023    Hypertensive urgency 2023    Elevated troponin level not due myocardial infarction 2023    Stable angina pectoris (HCC) 2023    Chronic kidney disease-mineral and bone disorder 2022     Nonrheumatic aortic valve stenosis 11/11/2022    Gross hematuria 07/26/2022    Platelets decreased ([facility]) 07/22/2022    Acute kidney injury superimposed on chronic kidney disease  ([facility]) 02/16/2022    Actinic keratoses 01/14/2022    Type 2 diabetes mellitus with diabetic peripheral angiopathy without gangrene, with long-term current use of insulin ([facility]) 01/11/2022    Varicose veins of left lower extremity 06/25/2021    History of endovascular stent graft for abdominal aortic aneurysm (AAA) 06/25/2021    Bruit (arterial) 06/25/2021    PAD (peripheral artery disease) ([facility]) 06/25/2021    Type 2 diabetes mellitus with diabetic polyneuropathy, with long-term current use of insulin ([facility]) 06/10/2021    Mixed hyperlipidemia 05/11/2021    Primary hypertension 05/11/2021    Coronary artery disease involving native coronary artery of native heart without angina pectoris 05/11/2021    S/P angioplasty with stent 05/11/2021    Hx of CABG 05/11/2021    S/P AAA repair 05/11/2021    S/P prostatectomy 05/11/2021    H/O prostate cancer 05/11/2021     No orders of the defined types were placed in this encounter.

## 2025-03-10 NOTE — PROGRESS NOTES
HBO Treatment Course Details       Treatment Notes: Patient tolerated HBOT well.  No complaints.  Wound care to right great toe- Woun'dres with gauze, rolled gauze and tape.  Patient tolerated well.      Treatment Course Number: 21  Total Treatments Ordered:  40     Diagnosis:   1. Diabetic ulcer of toe of right foot associated with type 2 diabetes mellitus, with necrosis of bone (HCC)  Hyperbaric oxygen thearpy          HBO Treatment Details:  In-Patient Visit: no  Treatment Length:90 Minutes(Minutes)  Chamber #: Hard sided Monoplace Chamber    Pre-Treatment details:  Pre-treatment protocol Treatment Protocol: 2.0 INDY X 90 minutes w/ 100% oxygen, two 5 minute air breaks  Left ear clear?: yes  Right ear clear?: yes  Left ear intact?: yes  Right ear intact?: yes  Left ear color: translucent  Right ear color: translucent  PE Tubes present, Left ear?: yes  PE Tubes present, Right ear?: yes  Left ear irrigated?: no  Right ear irrigated?: no  Left ear TEED scale: Grade 0  Right ear TEED scale: Grade 0   Pretreatment heart and lung assessment: Pretreatment heart and lung auscltation unremarkable. Patient cleared for HBOT     Treatment details:  INDY Rate: 2  Started Compression: 0816  Reached Compression: 0823  Total Compression Time: 7 (Minutes)  Total Holding Time: 100 (Minutes)  Started Decompression: 1003  Reached Surface: 1010  Total Decompression Time: 7 (Minutes)  Total Airbreaks:   (Minutes)  Total Time of Treatment: 114 (Minutes)  Symptoms Noted During Treatment: None (Minutes)    Post treatment details:                                                    Vital Signs:  HBO Glucose Reference Range: 100-350 mg/dl   Pre-Treatment Post-Treatment   Time vitals are taken: 0810 Time vitals are taken: 1011   Blood Pressure: 132/74 Blood Pressure: 118/70   Pulse: 74 Pulse: 74   Resp: 18 Resp: 16   Temp: 96.9 °F (36.1 °C) Temp: 96.5 °F (35.8 °C)   Pre-Inspection Glucose readin Post-Inspection Glucose readin        Allergies   Allergen Reactions    Lisinopril Rash and Lip Swelling     Patient Active Problem List    Diagnosis Date Noted    Constipation 02/21/2025    Gram-positive bacteremia 01/05/2025    Acute metabolic encephalopathy 01/05/2025    Bacteremia 01/04/2025    S/P placement of cardiac pacemaker 12/20/2024    Bradycardia 12/12/2024    Multiple open wounds of lower extremity 12/12/2024    Ulcer of right foot (HCC) 09/07/2024    SOB (shortness of breath) 09/06/2024    Nausea 09/06/2024    Elevated troponin 09/06/2024    History of DVT (deep vein thrombosis) 08/26/2024    Diabetic ulcer of toe of right foot associated with type 2 diabetes mellitus, with fat layer exposed ([facility]) 08/08/2024    Plantar fasciitis, right 07/10/2024    Acute deep vein thrombosis (DVT) of left peroneal vein ([facility]) 05/01/2024    Iron deficiency anemia 04/23/2024    Shortness of breath 04/09/2024    History of colon polyps 02/22/2024    Duodenal ulcer 02/01/2024    Multiple gastric ulcers 12/27/2023    Iron deficiency anemia due to chronic blood loss 12/27/2023    Melena 12/26/2023    Symptomatic anemia 12/26/2023    Frequent PVCs 06/17/2023    Chronic diastolic congestive heart failure ([facility]) 06/16/2023    Acute kidney injury superimposed on stage 3b chronic kidney disease ([facility]) 06/16/2023    Diabetic ulcer of right midfoot associated with diabetes mellitus due to underlying condition, limited to breakdown of skin ([facility]) 06/15/2023    Hypertensive urgency 05/11/2023    Elevated troponin level not due myocardial infarction 05/11/2023    Stable angina pectoris ([facility]) 03/07/2023    Chronic kidney disease-mineral and bone disorder 11/30/2022    Nonrheumatic aortic valve stenosis 11/11/2022    Gross hematuria 07/26/2022    Platelets decreased ([facility]) 07/22/2022    Acute kidney injury superimposed on chronic kidney disease  ([facility]) 02/16/2022    Actinic keratoses 01/14/2022    Type 2 diabetes mellitus with diabetic peripheral angiopathy without  gangrene, with long-term current use of insulin ([facility]) 01/11/2022    Varicose veins of left lower extremity 06/25/2021    History of endovascular stent graft for abdominal aortic aneurysm (AAA) 06/25/2021    Bruit (arterial) 06/25/2021    PAD (peripheral artery disease) ([facility]) 06/25/2021    Type 2 diabetes mellitus with diabetic polyneuropathy, with long-term current use of insulin ([facility]) 06/10/2021    Mixed hyperlipidemia 05/11/2021    Primary hypertension 05/11/2021    Coronary artery disease involving native coronary artery of native heart without angina pectoris 05/11/2021    S/P angioplasty with stent 05/11/2021    Hx of CABG 05/11/2021    S/P AAA repair 05/11/2021    S/P prostatectomy 05/11/2021    H/O prostate cancer 05/11/2021     No orders of the defined types were placed in this encounter.

## 2025-03-11 ENCOUNTER — TELEPHONE (OUTPATIENT)
Dept: WOUND CARE | Facility: HOSPITAL | Age: 80
End: 2025-03-11

## 2025-03-11 RX ORDER — AMLODIPINE BESYLATE 10 MG/1
5 TABLET ORAL DAILY
Qty: 45 TABLET | Refills: 3 | OUTPATIENT
Start: 2025-03-11

## 2025-03-11 RX ORDER — RANOLAZINE 1000 MG/1
1000 TABLET, EXTENDED RELEASE ORAL 2 TIMES DAILY
Qty: 180 TABLET | Refills: 3 | Status: SHIPPED | OUTPATIENT
Start: 2025-03-11 | End: 2026-03-06

## 2025-03-11 NOTE — TELEPHONE ENCOUNTER
Patients daughter called and stated that patient has food poisoning and fell back asleep therefore did not call the [facility] office.  He will likely be able to return to [facility] tomorrow.

## 2025-03-11 NOTE — TELEPHONE ENCOUNTER
Patient did not come to HBO treatment today.  Called his phone but it went to voice mail immediately.  Left a message to please call [facility] as it is unlike him to no show a visit.   If no response from him, this writer will contact his daughter to be sure patient is stable.

## 2025-03-13 ENCOUNTER — OFFICE VISIT (OUTPATIENT)
Dept: WOUND CARE | Facility: HOSPITAL | Age: 80
End: 2025-03-13
Payer: COMMERCIAL

## 2025-03-13 VITALS
RESPIRATION RATE: 18 BRPM | HEART RATE: 72 BPM | DIASTOLIC BLOOD PRESSURE: 64 MMHG | TEMPERATURE: 96.8 F | SYSTOLIC BLOOD PRESSURE: 118 MMHG

## 2025-03-13 DIAGNOSIS — E11.621 DIABETIC ULCER OF TOE OF RIGHT FOOT ASSOCIATED WITH TYPE 2 DIABETES MELLITUS, WITH NECROSIS OF BONE (HCC): ICD-10-CM

## 2025-03-13 DIAGNOSIS — L97.514 DIABETIC ULCER OF TOE OF RIGHT FOOT ASSOCIATED WITH TYPE 2 DIABETES MELLITUS, WITH NECROSIS OF BONE (HCC): ICD-10-CM

## 2025-03-13 LAB
GLUCOSE SERPL-MCNC: 142 MG/DL (ref 65–140)
GLUCOSE SERPL-MCNC: 156 MG/DL (ref 65–140)

## 2025-03-13 PROCEDURE — 99183 HYPERBARIC OXYGEN THERAPY: CPT | Performed by: STUDENT IN AN ORGANIZED HEALTH CARE EDUCATION/TRAINING PROGRAM

## 2025-03-13 PROCEDURE — G0277 HBOT, FULL BODY CHAMBER, 30M: HCPCS | Performed by: STUDENT IN AN ORGANIZED HEALTH CARE EDUCATION/TRAINING PROGRAM

## 2025-03-13 PROCEDURE — 82948 REAGENT STRIP/BLOOD GLUCOSE: CPT | Performed by: STUDENT IN AN ORGANIZED HEALTH CARE EDUCATION/TRAINING PROGRAM

## 2025-03-13 NOTE — PROGRESS NOTES
Wound Procedure Treatment Diabetic Ulcer Right;Posterior Toe D1, great    Performed by: Mendy Roldan RN  Authorized by: David Frye MD  Associated wounds:   Wound 11/08/24 Diabetic Ulcer Toe D1, great Right;Posterior    Wound cleansed with:  NSS   Applied topical:  Woun'Dres   Applied secondary dressing:  Gauze   Dressing secured with:  Ender and Tape

## 2025-03-13 NOTE — PROGRESS NOTES
HBO Treatment Course Details       Treatment Notes: Treatment tolerated well.  No complications     Treatment Course Number: 22  Total Treatments Ordered:  40     Diagnosis:   1. Diabetic ulcer of toe of right foot associated with type 2 diabetes mellitus, with necrosis of bone (HCC)  Hyperbaric oxygen thearpy    Wound Procedure Treatment Diabetic Ulcer Right;Posterior Toe D1, great          HBO Treatment Details:  In-Patient Visit: no  Treatment Length:90 Minutes(Minutes)  Chamber #: Hard sided Monoplace Chamber    Pre-Treatment details:  Pre-treatment protocol Treatment Protocol: 2.0 INDY X 90 minutes w/ 100% oxygen, two 5 minute air breaks  Left ear clear?: yes  Right ear clear?: yes  Left ear intact?: yes  Right ear intact?: yes  Left ear color: translucent  Right ear color: translucent  PE Tubes present, Left ear?: yes  PE Tubes present, Right ear?: yes  Left ear irrigated?: no  Right ear irrigated?: no  Left ear TEED scale: Grade 0  Right ear TEED scale: Grade 0   Pretreatment heart and lung assessment: Pretreatment heart and lung auscltation unremarkable. Patient cleared for HBOT     Treatment details:  INDY Rate: 2  Started Compression: 0833  Reached Compression: 0840  Total Compression Time: 7 (Minutes)  Total Holding Time: 100 (Minutes)  Started Decompression: 1020  Reached Surface: 1027  Total Decompression Time: 7 (Minutes)  Total Airbreaks:   (Minutes)  Total Time of Treatment: 114 (Minutes)  Symptoms Noted During Treatment: None (Minutes)    Post treatment details:  Left ear clear?: yes  Right ear clear?: yes  Left ears intact?: yes  Right ears intact?: yes  Left ear color: translucent  Right ear color: translucent  PE Tubes present, Left ear?: yes  PE Tubes present, Right ear?: yes        Left ear TEED scale: Grade 0  Right ear TEED scale: Grade 0  Post treatment heart and lung assessment: Post treatment heart and lung auscltation unremarkable. Patient cleared for discharge. Tolerated treatment well.              Vital Signs:  [facility] Glucose Reference Range: 100-350 mg/dl   Pre-Treatment Post-Treatment   Time vitals are taken: 0810 Time vitals are taken: 1030   Blood Pressure: 118/64 Blood Pressure: 108/64   Pulse: 72 Pulse: 76   Resp: 18 Resp: 18   Temp: 96.8 °F (36 °C) Temp: 97.2 °F (36.2 °C)   Pre-Inspection Glucose readin Post-Inspection Glucose readin       Allergies   Allergen Reactions    Lisinopril Rash and Lip Swelling     Patient Active Problem List    Diagnosis Date Noted    Constipation 2025    Gram-positive bacteremia 2025    Acute metabolic encephalopathy 2025    Bacteremia 2025    S/P placement of cardiac pacemaker 2024    Bradycardia 2024    Multiple open wounds of lower extremity 2024    Ulcer of right foot (HCC) 2024    SOB (shortness of breath) 2024    Nausea 2024    Elevated troponin 2024    History of DVT (deep vein thrombosis) 2024    Diabetic ulcer of toe of right foot associated with type 2 diabetes mellitus, with fat layer exposed (HCC) 2024    Plantar fasciitis, right 07/10/2024    Acute deep vein thrombosis (DVT) of left peroneal vein (HCC) 2024    Iron deficiency anemia 2024    Shortness of breath 2024    History of colon polyps 2024    Duodenal ulcer 2024    Multiple gastric ulcers 2023    Iron deficiency anemia due to chronic blood loss 2023    Melena 2023    Symptomatic anemia 2023    Frequent PVCs 2023    Chronic diastolic congestive heart failure (HCC) 2023    Acute kidney injury superimposed on stage 3b chronic kidney disease (HCC) 2023    Diabetic ulcer of right midfoot associated with diabetes mellitus due to underlying condition, limited to breakdown of skin (HCC) 06/15/2023    Hypertensive urgency 2023    Elevated troponin level not due myocardial infarction 2023    Stable angina pectoris (HCC) 2023     Chronic kidney disease-mineral and bone disorder 11/30/2022    Nonrheumatic aortic valve stenosis 11/11/2022    Gross hematuria 07/26/2022    Platelets decreased ([facility]) 07/22/2022    Acute kidney injury superimposed on chronic kidney disease  ([facility]) 02/16/2022    Actinic keratoses 01/14/2022    Type 2 diabetes mellitus with diabetic peripheral angiopathy without gangrene, with long-term current use of insulin ([facility]) 01/11/2022    Varicose veins of left lower extremity 06/25/2021    History of endovascular stent graft for abdominal aortic aneurysm (AAA) 06/25/2021    Bruit (arterial) 06/25/2021    PAD (peripheral artery disease) ([facility]) 06/25/2021    Type 2 diabetes mellitus with diabetic polyneuropathy, with long-term current use of insulin ([facility]) 06/10/2021    Mixed hyperlipidemia 05/11/2021    Primary hypertension 05/11/2021    Coronary artery disease involving native coronary artery of native heart without angina pectoris 05/11/2021    S/P angioplasty with stent 05/11/2021    Hx of CABG 05/11/2021    S/P AAA repair 05/11/2021    S/P prostatectomy 05/11/2021    H/O prostate cancer 05/11/2021     Orders Placed This Encounter   Procedures    Wound Procedure Treatment Diabetic Ulcer Right;Posterior Toe D1, great     This order was created via procedure documentation

## 2025-03-13 NOTE — PROGRESS NOTES
HBO Treatment Course Details       Treatment Notes: Patient tolerated HBO dive well.  No complaints.  Post HBO wound care completed.     Treatment Course Number: 22  Total Treatments Ordered:  40     Diagnosis:   1. Diabetic ulcer of toe of right foot associated with type 2 diabetes mellitus, with necrosis of bone (HCC)  Hyperbaric oxygen thearpy          HBO Treatment Details:  In-Patient Visit: no  Treatment Length:90 Minutes(Minutes)  Chamber #: Hard sided Monoplace Chamber    Pre-Treatment details:  Pre-treatment protocol Treatment Protocol: 2.0 INDY X 90 minutes w/ 100% oxygen, two 5 minute air breaks  Left ear clear?: yes  Right ear clear?: yes  Left ear intact?: yes  Right ear intact?: yes  Left ear color: translucent  Right ear color: translucent  PE Tubes present, Left ear?: yes  PE Tubes present, Right ear?: yes  Left ear irrigated?: no  Right ear irrigated?: no  Left ear TEED scale: Grade 0  Right ear TEED scale: Grade 0   Pretreatment heart and lung assessment: Pretreatment heart and lung auscltation unremarkable. Patient cleared for HBOT     Treatment details:  INDY Rate: 2  Started Compression: 0833  Reached Compression: 0840  Total Compression Time: 7 (Minutes)  Total Holding Time: 100 (Minutes)  Started Decompression: 1020  Reached Surface: 1027  Total Decompression Time: 7 (Minutes)  Total Airbreaks:   (Minutes)  Total Time of Treatment: 114 (Minutes)  Symptoms Noted During Treatment: None (Minutes)    Post treatment details:                                                    Vital Signs:  HBO Glucose Reference Range: 100-350 mg/dl   Pre-Treatment Post-Treatment   Time vitals are taken: 0810 Time vitals are taken: 1030   Blood Pressure: 118/64 Blood Pressure: 108/64   Pulse: 72 Pulse: 76   Resp: 18 Resp: 18   Temp: 96.8 °F (36 °C) Temp: 97.2 °F (36.2 °C)   Pre-Inspection Glucose readin Post-Inspection Glucose readin       Allergies   Allergen Reactions    Lisinopril Rash and Lip Swelling      Patient Active Problem List    Diagnosis Date Noted    Constipation 02/21/2025    Gram-positive bacteremia 01/05/2025    Acute metabolic encephalopathy 01/05/2025    Bacteremia 01/04/2025    S/P placement of cardiac pacemaker 12/20/2024    Bradycardia 12/12/2024    Multiple open wounds of lower extremity 12/12/2024    Ulcer of right foot (HCC) 09/07/2024    SOB (shortness of breath) 09/06/2024    Nausea 09/06/2024    Elevated troponin 09/06/2024    History of DVT (deep vein thrombosis) 08/26/2024    Diabetic ulcer of toe of right foot associated with type 2 diabetes mellitus, with fat layer exposed ([facility]) 08/08/2024    Plantar fasciitis, right 07/10/2024    Acute deep vein thrombosis (DVT) of left peroneal vein ([facility]) 05/01/2024    Iron deficiency anemia 04/23/2024    Shortness of breath 04/09/2024    History of colon polyps 02/22/2024    Duodenal ulcer 02/01/2024    Multiple gastric ulcers 12/27/2023    Iron deficiency anemia due to chronic blood loss 12/27/2023    Melena 12/26/2023    Symptomatic anemia 12/26/2023    Frequent PVCs 06/17/2023    Chronic diastolic congestive heart failure ([facility]) 06/16/2023    Acute kidney injury superimposed on stage 3b chronic kidney disease ([facility]) 06/16/2023    Diabetic ulcer of right midfoot associated with diabetes mellitus due to underlying condition, limited to breakdown of skin ([facility]) 06/15/2023    Hypertensive urgency 05/11/2023    Elevated troponin level not due myocardial infarction 05/11/2023    Stable angina pectoris ([facility]) 03/07/2023    Chronic kidney disease-mineral and bone disorder 11/30/2022    Nonrheumatic aortic valve stenosis 11/11/2022    Gross hematuria 07/26/2022    Platelets decreased ([facility]) 07/22/2022    Acute kidney injury superimposed on chronic kidney disease  ([facility]) 02/16/2022    Actinic keratoses 01/14/2022    Type 2 diabetes mellitus with diabetic peripheral angiopathy without gangrene, with long-term current use of insulin ([facility]) 01/11/2022    Varicose  veins of left lower extremity 06/25/2021    History of endovascular stent graft for abdominal aortic aneurysm (AAA) 06/25/2021    Bruit (arterial) 06/25/2021    PAD (peripheral artery disease) ([facility]) 06/25/2021    Type 2 diabetes mellitus with diabetic polyneuropathy, with long-term current use of insulin ([facility]) 06/10/2021    Mixed hyperlipidemia 05/11/2021    Primary hypertension 05/11/2021    Coronary artery disease involving native coronary artery of native heart without angina pectoris 05/11/2021    S/P angioplasty with stent 05/11/2021    Hx of CABG 05/11/2021    S/P AAA repair 05/11/2021    S/P prostatectomy 05/11/2021    H/O prostate cancer 05/11/2021     No orders of the defined types were placed in this encounter.

## 2025-03-14 ENCOUNTER — OFFICE VISIT (OUTPATIENT)
Dept: WOUND CARE | Facility: HOSPITAL | Age: 80
End: 2025-03-14
Payer: COMMERCIAL

## 2025-03-14 VITALS
DIASTOLIC BLOOD PRESSURE: 80 MMHG | HEART RATE: 70 BPM | TEMPERATURE: 96.6 F | RESPIRATION RATE: 18 BRPM | SYSTOLIC BLOOD PRESSURE: 112 MMHG

## 2025-03-14 DIAGNOSIS — E11.621 DIABETIC ULCER OF TOE OF RIGHT FOOT ASSOCIATED WITH TYPE 2 DIABETES MELLITUS, WITH NECROSIS OF BONE (HCC): ICD-10-CM

## 2025-03-14 DIAGNOSIS — E11.621 DIABETIC ULCER OF TOE OF RIGHT FOOT ASSOCIATED WITH TYPE 2 DIABETES MELLITUS, WITH NECROSIS OF BONE (HCC): Primary | ICD-10-CM

## 2025-03-14 DIAGNOSIS — L97.514 DIABETIC ULCER OF TOE OF RIGHT FOOT ASSOCIATED WITH TYPE 2 DIABETES MELLITUS, WITH NECROSIS OF BONE (HCC): Primary | ICD-10-CM

## 2025-03-14 DIAGNOSIS — L97.514 DIABETIC ULCER OF TOE OF RIGHT FOOT ASSOCIATED WITH TYPE 2 DIABETES MELLITUS, WITH NECROSIS OF BONE (HCC): ICD-10-CM

## 2025-03-14 LAB
GLUCOSE SERPL-MCNC: 130 MG/DL (ref 65–140)
GLUCOSE SERPL-MCNC: 159 MG/DL (ref 65–140)

## 2025-03-14 PROCEDURE — 11042 DBRDMT SUBQ TIS 1ST 20SQCM/<: CPT | Performed by: STUDENT IN AN ORGANIZED HEALTH CARE EDUCATION/TRAINING PROGRAM

## 2025-03-14 PROCEDURE — 82948 REAGENT STRIP/BLOOD GLUCOSE: CPT | Performed by: STUDENT IN AN ORGANIZED HEALTH CARE EDUCATION/TRAINING PROGRAM

## 2025-03-14 PROCEDURE — 99183 HYPERBARIC OXYGEN THERAPY: CPT | Performed by: STUDENT IN AN ORGANIZED HEALTH CARE EDUCATION/TRAINING PROGRAM

## 2025-03-14 PROCEDURE — G0277 HBOT, FULL BODY CHAMBER, 30M: HCPCS | Performed by: STUDENT IN AN ORGANIZED HEALTH CARE EDUCATION/TRAINING PROGRAM

## 2025-03-14 RX ORDER — LIDOCAINE 40 MG/G
CREAM TOPICAL ONCE
Status: COMPLETED | OUTPATIENT
Start: 2025-03-14 | End: 2025-03-14

## 2025-03-14 RX ADMIN — LIDOCAINE: 40 CREAM TOPICAL at 10:43

## 2025-03-14 NOTE — PATIENT INSTRUCTIONS
Orders Placed This Encounter   Procedures    Wound cleansing and dressings Diabetic Ulcer Right;Posterior Toe D1, great     Right Great Toe Wound:      Wash your hands with soap and water. Remove old dressing, discard into plastic bag and place in trash. Cleanse the wound with normal saline prior to applying a clean dressing. Do not use tissue or cotton balls. Do not scrub the wound. Pat dry using gauze.      Shower no, sponge bath only.  Apply Woun'dres to wound bed.   Cover with gauze   Secure with rolled gauze.  Change dressing daily Monday through Friday.     Edema control: Elevate extremity to heart level when sitting. Do not leave dependent.      Offloading: Limit walking to only necessity. Wear surgical shoe to right foot with any walking Wound infection: If you have signs of infection please call the wound center. If the wound center is closedplease go to the Emergency department. Some signs of infection: fever, chills, increased redness, red streaks, increase in pain, increased drainage. Drainage with an odor, Change in drainage color: white/milky/green/tan/yellow, an increase in swelling, chest pain and/or shortness of breath.      Protein: Eat protein with each meal to promote healing. Examples of protein are fish, meat, chicken, nuts, peanut butter, eggs, lentils, edamame or a protein shake.     Standing Status:   Future     Expiration Date:   3/21/2025

## 2025-03-14 NOTE — PROGRESS NOTES
HBO Treatment Course Details       Treatment Notes: Patient had dive #24 today, the treatment course states #23 which is inaccurate. Patient tolerated HBOT well.  He has a [facility] appointment today post dive.      Treatment Course Number: 23  Total Treatments Ordered:  40     Diagnosis:   1. Diabetic ulcer of toe of right foot associated with type 2 diabetes mellitus, with necrosis of bone (HCC)  Hyperbaric oxygen thearpy          HBO Treatment Details:  In-Patient Visit: no  Treatment Length:90 Minutes(Minutes)  Chamber #: Hard sided Monoplace Chamber    Pre-Treatment details:  Pre-treatment protocol Treatment Protocol: 2.0 INDY X 90 minutes w/ 100% oxygen, two 5 minute air breaks  Left ear clear?: yes  Right ear clear?: yes  Left ear intact?: yes  Right ear intact?: yes  Left ear color: translucent  Right ear color: translucent  PE Tubes present, Left ear?: yes  PE Tubes present, Right ear?: yes  Left ear irrigated?: no  Right ear irrigated?: no  Left ear TEED scale: Grade 0  Right ear TEED scale: Grade 0   Pretreatment heart and lung assessment: Pretreatment heart and lung auscltation unremarkable. Patient cleared for HBOT     Treatment details:  INDY Rate: 2  Started Compression: 0820  Reached Compression: 0828  Total Compression Time: 8 (Minutes)  Total Holding Time: 100 (Minutes)  Started Decompression: 1008  Reached Surface: 1016  Total Decompression Time: 8 (Minutes)  Total Airbreaks:   (Minutes)  Total Time of Treatment: 116 (Minutes)  Symptoms Noted During Treatment: None (Minutes)    Post treatment details:                                                    Vital Signs:  HBO Glucose Reference Range: 100-350 mg/dl   Pre-Treatment Post-Treatment   Time vitals are taken: 0815 Time vitals are taken: 1017   Blood Pressure: 134/84 Blood Pressure: 112/80   Pulse: 78 Pulse: 70   Resp: 20 Resp: 18   Temp: 96.2 °F (35.7 °C) Temp: 96.6 °F (35.9 °C)   Pre-Inspection Glucose readin Post-Inspection Glucose readin        Allergies   Allergen Reactions    Lisinopril Rash and Lip Swelling     Patient Active Problem List    Diagnosis Date Noted    Constipation 02/21/2025    Gram-positive bacteremia 01/05/2025    Acute metabolic encephalopathy 01/05/2025    Bacteremia 01/04/2025    S/P placement of cardiac pacemaker 12/20/2024    Bradycardia 12/12/2024    Multiple open wounds of lower extremity 12/12/2024    Ulcer of right foot (HCC) 09/07/2024    SOB (shortness of breath) 09/06/2024    Nausea 09/06/2024    Elevated troponin 09/06/2024    History of DVT (deep vein thrombosis) 08/26/2024    Diabetic ulcer of toe of right foot associated with type 2 diabetes mellitus, with fat layer exposed ([facility]) 08/08/2024    Plantar fasciitis, right 07/10/2024    Acute deep vein thrombosis (DVT) of left peroneal vein ([facility]) 05/01/2024    Iron deficiency anemia 04/23/2024    Shortness of breath 04/09/2024    History of colon polyps 02/22/2024    Duodenal ulcer 02/01/2024    Multiple gastric ulcers 12/27/2023    Iron deficiency anemia due to chronic blood loss 12/27/2023    Melena 12/26/2023    Symptomatic anemia 12/26/2023    Frequent PVCs 06/17/2023    Chronic diastolic congestive heart failure ([facility]) 06/16/2023    Acute kidney injury superimposed on stage 3b chronic kidney disease ([facility]) 06/16/2023    Diabetic ulcer of right midfoot associated with diabetes mellitus due to underlying condition, limited to breakdown of skin ([facility]) 06/15/2023    Hypertensive urgency 05/11/2023    Elevated troponin level not due myocardial infarction 05/11/2023    Stable angina pectoris ([facility]) 03/07/2023    Chronic kidney disease-mineral and bone disorder 11/30/2022    Nonrheumatic aortic valve stenosis 11/11/2022    Gross hematuria 07/26/2022    Platelets decreased ([facility]) 07/22/2022    Acute kidney injury superimposed on chronic kidney disease  ([facility]) 02/16/2022    Actinic keratoses 01/14/2022    Type 2 diabetes mellitus with diabetic peripheral angiopathy without  gangrene, with long-term current use of insulin ([facility]) 01/11/2022    Varicose veins of left lower extremity 06/25/2021    History of endovascular stent graft for abdominal aortic aneurysm (AAA) 06/25/2021    Bruit (arterial) 06/25/2021    PAD (peripheral artery disease) ([facility]) 06/25/2021    Type 2 diabetes mellitus with diabetic polyneuropathy, with long-term current use of insulin ([facility]) 06/10/2021    Mixed hyperlipidemia 05/11/2021    Primary hypertension 05/11/2021    Coronary artery disease involving native coronary artery of native heart without angina pectoris 05/11/2021    S/P angioplasty with stent 05/11/2021    Hx of CABG 05/11/2021    S/P AAA repair 05/11/2021    S/P prostatectomy 05/11/2021    H/O prostate cancer 05/11/2021     No orders of the defined types were placed in this encounter.

## 2025-03-14 NOTE — PROGRESS NOTES
Name: Thomas Horne      : 1945      MRN: [id number]  Encounter Provider: David Frye MD  Encounter Date: 3/14/2025   Encounter department: [facility] WOUND CARE  :  Assessment & Plan  Diabetic ulcer of toe of right foot associated with type 2 diabetes mellitus, with necrosis of bone (HCC)  It was a pleasure to see Thomas Horne for wound care follow up today  Subcu  debridement performed today as above  Wound is improving   Continue plan of care as noted below with woun'dress   A1C results reviewed with the patient today.   No signs or symptoms of infection today. Patient understands that if any signs of infection start (such as increased redness, drainage, pain, fever, chills, diaphoresis), they should call our office or proceed to the ER or Urgent Care.  Patient should continue a high protein diet to facilitate wound healing  Patient is advised to not submerge wound or leave wound open to air.  Follow up in 1 weeks  Given the multi-factorial nature of wound care, additional time was taken to review patient's treatment plan with other specialties and most recent pertinent lab work and imaging.   All plans of care discussed with patient at bedside who verbalized understanding with treatment plan.    Lab Results   Component Value Date    HGBA1C 6.4 (H) 02/15/2025       Orders:    lidocaine (LMX) 4 % cream    Wound cleansing and dressings Diabetic Ulcer Right;Posterior Toe D1, great; Future    Debridement        History of Present Illness   Chief Complaint   Patient presents with    Follow Up Wound Care Visit     Right toe   Here for wound follow up.  80-year-old male here today for follow-up care of Sumner 4 DFU right great toe.  Currently undergoing hyperbaric oxygen therapy for the same.  Wound dressings being changed daily at wound care center.  No symptoms of infection.      Objective   /80   Pulse 70   Temp (!) 96.6 °F (35.9 °C)   Resp 18     Physical Exam  Vitals reviewed.  "  Constitutional:       Appearance: Normal appearance.   HENT:      Head: Normocephalic and atraumatic.   Eyes:      Extraocular Movements: Extraocular movements intact.   Pulmonary:      Effort: Pulmonary effort is normal.   Musculoskeletal:      Cervical back: Neck supple.   Skin:     Comments: Distal wound of right great toe slightly smaller than last exam.  Dry wound bed with callus deposition surrounding.  No signs of infection.  Not probing to bone.   Neurological:      Mental Status: He is alert.   Psychiatric:         Mood and Affect: Mood normal.       Wound 11/08/24 Diabetic Ulcer Toe D1, great Right;Posterior (Active)   Wound Image   03/14/25 1053   Enter Sumner score: Sumner Grade 4: Partial-foot gangrene 03/14/25 1039   Wound Description Pink;Yellow 03/14/25 1039   Non-staged Wound Description Full thickness 03/14/25 1039   Wound Length (cm) 0.1 cm 03/14/25 1039   Wound Width (cm) 0.7 cm 03/14/25 1039   Wound Depth (cm) 0.1 cm 03/14/25 1039   Wound Surface Area (cm^2) 0.07 cm^2 03/14/25 1039   Wound Volume (cm^3) 0.007 cm^3 03/14/25 1039   Calculated Wound Volume (cm^3) 0.01 cm^3 03/14/25 1039   Change in Wound Size % 83.33 03/14/25 1039   Drainage Amount Scant 03/14/25 1039   Drainage Description Serous 03/14/25 1039   Clara-wound Assessment Dry;Callus;Clean 03/14/25 1039   Dressing Status Intact 03/14/25 1039       Debridement   Wound 11/08/24 Diabetic Ulcer Toe D1, great Right;Posterior     Date/Time: 3/14/2025 9:15 AM  Universal Protocol:  procedure performed by consultantConsent: Verbal consent obtained.  Risks and benefits: risks, benefits and alternatives were discussed  Consent given by: patient  Time out: Immediately prior to procedure a \"time out\" was called to verify the correct patient, procedure, equipment, support staff and site/side marked as required.  Patient identity confirmed: verbally with patient    Debridement Details  Performed by: physician  Debridement type: surgical  Level of " "debridement: subcutaneous tissue  Pain control: lidocaine 4%    Post-debridement measurements  Length (cm): 0.2  Width (cm): 0.8  Depth (cm): 0.2  Percent debrided: 100%  Surface Area (cm^2): 0.16  Area Debrided (cm^2): 0.16  Volume (cm^3): 0.03    Devitalized tissue debrided: biofilm and exudate  Instrument(s) utilized: curette  Bleeding: medium  Hemostasis obtained with: pressure and silver nitrate  Procedural pain (0-10): 2  Post-procedural pain: 1   Response to treatment: procedure was tolerated well       Results from last 6 Months   Lab Units 01/03/25  1513   WOUND CULTURE  1+ Growth of Staphylococcus aureus*  1+ Growth of           --  David Frye MD    \"This note has been constructed using a voice recognition system. Therefore there may be syntax, spelling, and/or grammatical errors. Occasional wrong word or \"sound alike\" substitutions may have occurred due to the inherent limitations of voice recognition software. Read the chart carefully and recognize, using context, where substitutions have occurred. Please call if you have any questions.\"     "

## 2025-03-14 NOTE — PROGRESS NOTES
HBO Treatment Course Details       Treatment Notes: Treatment tolerated well.  No complications.     Treatment Course Number: 23  Total Treatments Ordered:  40     Diagnosis:   1. Diabetic ulcer of toe of right foot associated with type 2 diabetes mellitus, with necrosis of bone (HCC)  Hyperbaric oxygen thearpy          HBO Treatment Details:  In-Patient Visit: no  Treatment Length:90 Minutes(Minutes)  Chamber #: Hard sided Monoplace Chamber    Pre-Treatment details:  Pre-treatment protocol Treatment Protocol: 2.0 INDY X 90 minutes w/ 100% oxygen, two 5 minute air breaks  Left ear clear?: yes  Right ear clear?: yes  Left ear intact?: yes  Right ear intact?: yes  Left ear color: translucent  Right ear color: translucent  PE Tubes present, Left ear?: yes  PE Tubes present, Right ear?: yes  Left ear irrigated?: no  Right ear irrigated?: no  Left ear TEED scale: Grade 0  Right ear TEED scale: Grade 0   Pretreatment heart and lung assessment: Pretreatment heart and lung auscltation unremarkable. Patient cleared for HBOT     Treatment details:  INDY Rate: 2  Started Compression: 0820  Reached Compression: 0828  Total Compression Time: 8 (Minutes)  Total Holding Time: 100 (Minutes)  Started Decompression: 1008  Reached Surface: 1016  Total Decompression Time: 8 (Minutes)  Total Airbreaks:   (Minutes)  Total Time of Treatment: 116 (Minutes)  Symptoms Noted During Treatment: None (Minutes)    Post treatment details:  Left ear clear?: yes  Right ear clear?: yes  Left ears intact?: yes  Right ears intact?: yes  Left ear color: translucent  Right ear color: translucent  PE Tubes present, Left ear?: yes  PE Tubes present, Right ear?: yes        Left ear TEED scale: Grade 0  Right ear TEED scale: Grade 0  Post treatment heart and lung assessment: Post treatment heart and lung auscltation unremarkable. Patient cleared for discharge. Tolerated treatment well.             Vital Signs:  HBO Glucose Reference Range: 100-350 mg/dl    Pre-Treatment Post-Treatment   Time vitals are taken: 0815 Time vitals are taken: 1017   Blood Pressure: 134/84 Blood Pressure: 112/80   Pulse: 78 Pulse: 70   Resp: 20 Resp: 18   Temp: 96.2 °F (35.7 °C) Temp: 96.6 °F (35.9 °C)   Pre-Inspection Glucose readin Post-Inspection Glucose readin       Allergies   Allergen Reactions    Lisinopril Rash and Lip Swelling     Patient Active Problem List    Diagnosis Date Noted    Constipation 2025    Gram-positive bacteremia 2025    Acute metabolic encephalopathy 2025    Bacteremia 2025    S/P placement of cardiac pacemaker 2024    Bradycardia 2024    Multiple open wounds of lower extremity 2024    Ulcer of right foot (HCC) 2024    SOB (shortness of breath) 2024    Nausea 2024    Elevated troponin 2024    History of DVT (deep vein thrombosis) 2024    Diabetic ulcer of toe of right foot associated with type 2 diabetes mellitus, with fat layer exposed (HCC) 2024    Plantar fasciitis, right 07/10/2024    Acute deep vein thrombosis (DVT) of left peroneal vein (HCC) 2024    Iron deficiency anemia 2024    Shortness of breath 2024    History of colon polyps 2024    Duodenal ulcer 2024    Multiple gastric ulcers 2023    Iron deficiency anemia due to chronic blood loss 2023    Melena 2023    Symptomatic anemia 2023    Frequent PVCs 2023    Chronic diastolic congestive heart failure (HCC) 2023    Acute kidney injury superimposed on stage 3b chronic kidney disease (HCC) 2023    Diabetic ulcer of right midfoot associated with diabetes mellitus due to underlying condition, limited to breakdown of skin (HCC) 06/15/2023    Hypertensive urgency 2023    Elevated troponin level not due myocardial infarction 2023    Stable angina pectoris (HCC) 2023    Chronic kidney disease-mineral and bone disorder 2022     Nonrheumatic aortic valve stenosis 11/11/2022    Gross hematuria 07/26/2022    Platelets decreased ([facility]) 07/22/2022    Acute kidney injury superimposed on chronic kidney disease  ([facility]) 02/16/2022    Actinic keratoses 01/14/2022    Type 2 diabetes mellitus with diabetic peripheral angiopathy without gangrene, with long-term current use of insulin ([facility]) 01/11/2022    Varicose veins of left lower extremity 06/25/2021    History of endovascular stent graft for abdominal aortic aneurysm (AAA) 06/25/2021    Bruit (arterial) 06/25/2021    PAD (peripheral artery disease) ([facility]) 06/25/2021    Type 2 diabetes mellitus with diabetic polyneuropathy, with long-term current use of insulin ([facility]) 06/10/2021    Mixed hyperlipidemia 05/11/2021    Primary hypertension 05/11/2021    Coronary artery disease involving native coronary artery of native heart without angina pectoris 05/11/2021    S/P angioplasty with stent 05/11/2021    Hx of CABG 05/11/2021    S/P AAA repair 05/11/2021    S/P prostatectomy 05/11/2021    H/O prostate cancer 05/11/2021     No orders of the defined types were placed in this encounter.

## 2025-03-17 ENCOUNTER — OFFICE VISIT (OUTPATIENT)
Dept: WOUND CARE | Facility: HOSPITAL | Age: 80
End: 2025-03-17
Payer: COMMERCIAL

## 2025-03-17 DIAGNOSIS — N17.9 ACUTE KIDNEY INJURY SUPERIMPOSED ON CHRONIC KIDNEY DISEASE  (HCC): ICD-10-CM

## 2025-03-17 DIAGNOSIS — I50.32 CHRONIC HEART FAILURE WITH PRESERVED EJECTION FRACTION (HFPEF) (HCC): ICD-10-CM

## 2025-03-17 DIAGNOSIS — L97.514 DIABETIC ULCER OF TOE OF RIGHT FOOT ASSOCIATED WITH TYPE 2 DIABETES MELLITUS, WITH NECROSIS OF BONE (HCC): ICD-10-CM

## 2025-03-17 DIAGNOSIS — N18.9 ACUTE KIDNEY INJURY SUPERIMPOSED ON CHRONIC KIDNEY DISEASE  (HCC): ICD-10-CM

## 2025-03-17 DIAGNOSIS — K26.9 DUODENAL ULCER: ICD-10-CM

## 2025-03-17 DIAGNOSIS — E11.621 DIABETIC ULCER OF TOE OF RIGHT FOOT ASSOCIATED WITH TYPE 2 DIABETES MELLITUS, WITH NECROSIS OF BONE (HCC): ICD-10-CM

## 2025-03-17 LAB
GLUCOSE SERPL-MCNC: 139 MG/DL (ref 65–140)
GLUCOSE SERPL-MCNC: 149 MG/DL (ref 65–140)

## 2025-03-17 PROCEDURE — 99183 HYPERBARIC OXYGEN THERAPY: CPT | Performed by: STUDENT IN AN ORGANIZED HEALTH CARE EDUCATION/TRAINING PROGRAM

## 2025-03-17 PROCEDURE — G0277 HBOT, FULL BODY CHAMBER, 30M: HCPCS | Performed by: STUDENT IN AN ORGANIZED HEALTH CARE EDUCATION/TRAINING PROGRAM

## 2025-03-17 PROCEDURE — 82948 REAGENT STRIP/BLOOD GLUCOSE: CPT | Performed by: STUDENT IN AN ORGANIZED HEALTH CARE EDUCATION/TRAINING PROGRAM

## 2025-03-17 NOTE — PROGRESS NOTES
HBO Treatment Course Details       Treatment Notes: Treatment tolerated well.  No complications.     Treatment Course Number: 25  Total Treatments Ordered:  40     Diagnosis:   1. Diabetic ulcer of toe of right foot associated with type 2 diabetes mellitus, with necrosis of bone (HCC)  Hyperbaric oxygen thearpy          HBO Treatment Details:  In-Patient Visit: no  Treatment Length:90 Minutes(Minutes)  Chamber #: Hard sided Monoplace Chamber    Pre-Treatment details:  Pre-treatment protocol Treatment Protocol: 2.0 INDY X 90 minutes w/ 100% oxygen, two 5 minute air breaks  Left ear clear?: yes  Right ear clear?: yes  Left ear intact?: yes  Right ear intact?: yes  Left ear color: translucent  Right ear color: translucent  PE Tubes present, Left ear?: yes  PE Tubes present, Right ear?: yes  Left ear irrigated?: no  Right ear irrigated?: no  Left ear TEED scale: Grade 0  Right ear TEED scale: Grade 0   Pretreatment heart and lung assessment: Pretreatment heart and lung auscltation unremarkable. Patient cleared for HBOT     Treatment details:  INDY Rate: 2  Started Compression: 0823  Reached Compression: 0830  Total Compression Time: 7 (Minutes)  Total Holding Time: 100 (Minutes)  Started Decompression: 1010  Reached Surface: 1018  Total Decompression Time: 8 (Minutes)  Total Airbreaks:   (Minutes)  Total Time of Treatment: 115 (Minutes)  Symptoms Noted During Treatment: None (Minutes)    Post treatment details:  Left ear clear?: yes  Right ear clear?: yes  Left ears intact?: yes  Right ears intact?: yes  Left ear color: translucent  Right ear color: translucent  PE Tubes present, Left ear?: yes  PE Tubes present, Right ear?: yes        Left ear TEED scale: Grade 0  Right ear TEED scale: Grade 0  Post treatment heart and lung assessment: Post treatment heart and lung auscltation unremarkable. Patient cleared for discharge. Tolerated treatment well.             Vital Signs:  HBO Glucose Reference Range: 100-350 mg/dl    Pre-Treatment Post-Treatment   Time vitals are taken: 0815 Time vitals are taken: 1020   Blood Pressure: 142/84 Blood Pressure: 116/66   Pulse: 68 Pulse: 68   Resp: 18 Resp: 16   Temp: 97.8 °F (36.6 °C) Temp: 96.6 °F (35.9 °C)   Pre-Inspection Glucose readin Post-Inspection Glucose readin       Allergies   Allergen Reactions    Lisinopril Rash and Lip Swelling     Patient Active Problem List    Diagnosis Date Noted    Constipation 2025    Gram-positive bacteremia 2025    Acute metabolic encephalopathy 2025    Bacteremia 2025    S/P placement of cardiac pacemaker 2024    Bradycardia 2024    Multiple open wounds of lower extremity 2024    Ulcer of right foot (HCC) 2024    SOB (shortness of breath) 2024    Nausea 2024    Elevated troponin 2024    History of DVT (deep vein thrombosis) 2024    Diabetic ulcer of toe of right foot associated with type 2 diabetes mellitus, with fat layer exposed (HCC) 2024    Plantar fasciitis, right 07/10/2024    Acute deep vein thrombosis (DVT) of left peroneal vein (HCC) 2024    Iron deficiency anemia 2024    Shortness of breath 2024    History of colon polyps 2024    Duodenal ulcer 2024    Multiple gastric ulcers 2023    Iron deficiency anemia due to chronic blood loss 2023    Melena 2023    Symptomatic anemia 2023    Frequent PVCs 2023    Chronic diastolic congestive heart failure (HCC) 2023    Acute kidney injury superimposed on stage 3b chronic kidney disease (HCC) 2023    Diabetic ulcer of right midfoot associated with diabetes mellitus due to underlying condition, limited to breakdown of skin (HCC) 06/15/2023    Hypertensive urgency 2023    Elevated troponin level not due myocardial infarction 2023    Stable angina pectoris (HCC) 2023    Chronic kidney disease-mineral and bone disorder 2022     Nonrheumatic aortic valve stenosis 11/11/2022    Gross hematuria 07/26/2022    Platelets decreased ([facility]) 07/22/2022    Acute kidney injury superimposed on chronic kidney disease  ([facility]) 02/16/2022    Actinic keratoses 01/14/2022    Type 2 diabetes mellitus with diabetic peripheral angiopathy without gangrene, with long-term current use of insulin ([facility]) 01/11/2022    Varicose veins of left lower extremity 06/25/2021    History of endovascular stent graft for abdominal aortic aneurysm (AAA) 06/25/2021    Bruit (arterial) 06/25/2021    PAD (peripheral artery disease) ([facility]) 06/25/2021    Type 2 diabetes mellitus with diabetic polyneuropathy, with long-term current use of insulin ([facility]) 06/10/2021    Mixed hyperlipidemia 05/11/2021    Primary hypertension 05/11/2021    Coronary artery disease involving native coronary artery of native heart without angina pectoris 05/11/2021    S/P angioplasty with stent 05/11/2021    Hx of CABG 05/11/2021    S/P AAA repair 05/11/2021    S/P prostatectomy 05/11/2021    H/O prostate cancer 05/11/2021     No orders of the defined types were placed in this encounter.

## 2025-03-17 NOTE — PROGRESS NOTES
HBO Treatment Course Details       Treatment Notes: Patient tolerated the HBOT well.  Following dive, Woun'Dres applied to right great toe, covered with gauze and rolled gauze and tape, Patient tolerated well.      Treatment Course Number: 25  Total Treatments Ordered:  40     Diagnosis:   1. Diabetic ulcer of toe of right foot associated with type 2 diabetes mellitus, with necrosis of bone (HCC)  [facility]          H[facility] Treatment Details:  In-Patient Visit: no  Treatment Length:90 Minutes(Minutes)  Chamber #: Hard sided Monoplace Chamber    Pre-Treatment details:  Pre-treatment protocol Treatment Protocol: 2.0 INDY X 90 minutes w/ 100% oxygen, two 5 minute air breaks  Left ear clear?: yes  Right ear clear?: yes  Left ear intact?: yes  Right ear intact?: yes  Left ear color: translucent  Right ear color: translucent  PE Tubes present, Left ear?: yes  PE Tubes present, Right ear?: yes  Left ear irrigated?: no  Right ear irrigated?: no  Left ear TEED scale: Grade 0  Right ear TEED scale: Grade 0   Pretreatment heart and lung assessment: Pretreatment heart and lung auscltation unremarkable. Patient cleared for HBOT     Treatment details:  INDY Rate: 2  Started Compression: 0823  Reached Compression: 0830  Total Compression Time: 7 (Minutes)  Total Holding Time: 100 (Minutes)  Started Decompression: 1010  Reached Surface: 1018  Total Decompression Time: 8 (Minutes)  Total Airbreaks:   (Minutes)  Total Time of Treatment: 115 (Minutes)  Symptoms Noted During Treatment: None (Minutes)    Post treatment details:  Left ear clear?: yes  Right ear clear?: yes  Left ears intact?: yes  Right ears intact?: yes  Left ear color: translucent  Right ear color: translucent  PE Tubes present, Left ear?: yes  PE Tubes present, Right ear?: yes        Left ear TEED scale: Grade 0  Right ear TEED scale: Grade 0  Post treatment heart and lung assessment: Post treatment heart and lung auscltation unremarkable. Patient cleared  for discharge. Tolerated treatment well.             Vital Signs:  [facility] Glucose Reference Range: 100-350 mg/dl   Pre-Treatment Post-Treatment   Time vitals are taken: 0815 Time vitals are taken: 1020   Blood Pressure: 142/84 Blood Pressure: 116/66   Pulse: 68 Pulse: 68   Resp: 18 Resp: 16   Temp: 97.8 °F (36.6 °C) Temp: 96.6 °F (35.9 °C)   Pre-Inspection Glucose readin Post-Inspection Glucose readin       Allergies   Allergen Reactions    Lisinopril Rash and Lip Swelling     Patient Active Problem List    Diagnosis Date Noted    Constipation 2025    Gram-positive bacteremia 2025    Acute metabolic encephalopathy 2025    Bacteremia 2025    S/P placement of cardiac pacemaker 2024    Bradycardia 2024    Multiple open wounds of lower extremity 2024    Ulcer of right foot (HCC) 2024    SOB (shortness of breath) 2024    Nausea 2024    Elevated troponin 2024    History of DVT (deep vein thrombosis) 2024    Diabetic ulcer of toe of right foot associated with type 2 diabetes mellitus, with fat layer exposed (HCC) 2024    Plantar fasciitis, right 07/10/2024    Acute deep vein thrombosis (DVT) of left peroneal vein (HCC) 2024    Iron deficiency anemia 2024    Shortness of breath 2024    History of colon polyps 2024    Duodenal ulcer 2024    Multiple gastric ulcers 2023    Iron deficiency anemia due to chronic blood loss 2023    Melena 2023    Symptomatic anemia 2023    Frequent PVCs 2023    Chronic diastolic congestive heart failure (HCC) 2023    Acute kidney injury superimposed on stage 3b chronic kidney disease (HCC) 2023    Diabetic ulcer of right midfoot associated with diabetes mellitus due to underlying condition, limited to breakdown of skin (HCC) 06/15/2023    Hypertensive urgency 2023    Elevated troponin level not due myocardial infarction 2023     Stable angina pectoris ([facility]) 03/07/2023    Chronic kidney disease-mineral and bone disorder 11/30/2022    Nonrheumatic aortic valve stenosis 11/11/2022    Gross hematuria 07/26/2022    Platelets decreased ([facility]) 07/22/2022    Acute kidney injury superimposed on chronic kidney disease  ([facility]) 02/16/2022    Actinic keratoses 01/14/2022    Type 2 diabetes mellitus with diabetic peripheral angiopathy without gangrene, with long-term current use of insulin ([facility]) 01/11/2022    Varicose veins of left lower extremity 06/25/2021    History of endovascular stent graft for abdominal aortic aneurysm (AAA) 06/25/2021    Bruit (arterial) 06/25/2021    PAD (peripheral artery disease) ([facility]) 06/25/2021    Type 2 diabetes mellitus with diabetic polyneuropathy, with long-term current use of insulin ([facility]) 06/10/2021    Mixed hyperlipidemia 05/11/2021    Primary hypertension 05/11/2021    Coronary artery disease involving native coronary artery of native heart without angina pectoris 05/11/2021    S/P angioplasty with stent 05/11/2021    Hx of CABG 05/11/2021    S/P AAA repair 05/11/2021    S/P prostatectomy 05/11/2021    H/O prostate cancer 05/11/2021     No orders of the defined types were placed in this encounter.

## 2025-03-18 ENCOUNTER — OFFICE VISIT (OUTPATIENT)
Dept: WOUND CARE | Facility: HOSPITAL | Age: 80
End: 2025-03-18
Payer: COMMERCIAL

## 2025-03-18 DIAGNOSIS — L97.514 DIABETIC ULCER OF TOE OF RIGHT FOOT ASSOCIATED WITH TYPE 2 DIABETES MELLITUS, WITH NECROSIS OF BONE (HCC): ICD-10-CM

## 2025-03-18 DIAGNOSIS — E11.621 DIABETIC ULCER OF TOE OF RIGHT FOOT ASSOCIATED WITH TYPE 2 DIABETES MELLITUS, WITH NECROSIS OF BONE (HCC): ICD-10-CM

## 2025-03-18 DIAGNOSIS — I10 ESSENTIAL HYPERTENSION: ICD-10-CM

## 2025-03-18 LAB
GLUCOSE SERPL-MCNC: 166 MG/DL (ref 65–140)
GLUCOSE SERPL-MCNC: 200 MG/DL (ref 65–140)

## 2025-03-18 PROCEDURE — 82948 REAGENT STRIP/BLOOD GLUCOSE: CPT | Performed by: STUDENT IN AN ORGANIZED HEALTH CARE EDUCATION/TRAINING PROGRAM

## 2025-03-18 PROCEDURE — G0277 HBOT, FULL BODY CHAMBER, 30M: HCPCS | Performed by: STUDENT IN AN ORGANIZED HEALTH CARE EDUCATION/TRAINING PROGRAM

## 2025-03-18 PROCEDURE — 99183 HYPERBARIC OXYGEN THERAPY: CPT | Performed by: STUDENT IN AN ORGANIZED HEALTH CARE EDUCATION/TRAINING PROGRAM

## 2025-03-18 RX ORDER — ISOSORBIDE MONONITRATE 30 MG/1
90 TABLET, EXTENDED RELEASE ORAL DAILY
Qty: 270 TABLET | Refills: 1 | Status: SHIPPED | OUTPATIENT
Start: 2025-03-18

## 2025-03-18 NOTE — PROGRESS NOTES
HBO Treatment Course Details       Treatment Notes: Treatment tolerated well.  No complications.     Treatment Course Number: 26  Total Treatments Ordered:  40     Diagnosis:   1. Diabetic ulcer of toe of right foot associated with type 2 diabetes mellitus, with necrosis of bone (HCC)  Hyperbaric oxygen thearpy          HBO Treatment Details:  In-Patient Visit: no  Treatment Length:90 Minutes(Minutes)  Chamber #: Hard sided Monoplace Chamber    Pre-Treatment details:  Pre-treatment protocol Treatment Protocol: 2.0 INDY X 90 minutes w/ 100% oxygen, two 5 minute air breaks  Left ear clear?: yes  Right ear clear?: yes  Left ear intact?: yes  Right ear intact?: yes  Left ear color: translucent  Right ear color: translucent  PE Tubes present, Left ear?: yes  PE Tubes present, Right ear?: yes  Left ear irrigated?: no  Right ear irrigated?: no  Left ear TEED scale: Grade 0  Right ear TEED scale: Grade 0   Pretreatment heart and lung assessment: Pretreatment heart and lung auscltation unremarkable. Patient cleared for HBOT     Treatment details:  INDY Rate: 2  Started Compression: 1049  Reached Compression: 1056  Total Compression Time: 7 (Minutes)  Total Holding Time: 100 (Minutes)  Started Decompression: 1236  Reached Surface: 1243  Total Decompression Time: 7 (Minutes)  Total Airbreaks:   (Minutes)  Total Time of Treatment: 114 (Minutes)  Symptoms Noted During Treatment: None (Minutes)    Post treatment details:  Left ear clear?: yes  Right ear clear?: yes  Left ears intact?: yes  Right ears intact?: yes  Left ear color: translucent  Right ear color: translucent  PE Tubes present, Left ear?: yes  PE Tubes present, Right ear?: yes        Left ear TEED scale: Grade 0  Right ear TEED scale: Grade 0  Post treatment heart and lung assessment: Post treatment heart and lung auscltation unremarkable. Patient cleared for discharge. Tolerated treatment well.             Vital Signs:  HBO Glucose Reference Range: 100-350 mg/dl    Pre-Treatment Post-Treatment   Time vitals are taken: 1045 Time vitals are taken: 1244   Blood Pressure: 142/82 Blood Pressure: 126/84   Pulse: 76 Pulse: 74   Resp: 15 Resp: 15   Temp: 97.3 °F (36.3 °C) Temp: 96.8 °F (36 °C)   Pre-Inspection Glucose readin Post-Inspection Glucose readin       Allergies   Allergen Reactions   • Lisinopril Rash and Lip Swelling     Patient Active Problem List    Diagnosis Date Noted   • Constipation 2025   • Gram-positive bacteremia 2025   • Acute metabolic encephalopathy 2025   • Bacteremia 2025   • S/P placement of cardiac pacemaker 2024   • Bradycardia 2024   • Multiple open wounds of lower extremity 2024   • Ulcer of right foot ([facility]) 2024   • SOB (shortness of breath) 2024   • Nausea 2024   • Elevated troponin 2024   • History of DVT (deep vein thrombosis) 2024   • Diabetic ulcer of toe of right foot associated with type 2 diabetes mellitus, with fat layer exposed ([facility]) 2024   • Plantar fasciitis, right 07/10/2024   • Acute deep vein thrombosis (DVT) of left peroneal vein ([facility]) 2024   • Iron deficiency anemia 2024   • Shortness of breath 2024   • History of colon polyps 2024   • Duodenal ulcer 2024   • Multiple gastric ulcers 2023   • Iron deficiency anemia due to chronic blood loss 2023   • Melena 2023   • Symptomatic anemia 2023   • Frequent PVCs 2023   • Chronic diastolic congestive heart failure ([facility]) 2023   • Acute kidney injury superimposed on stage 3b chronic kidney disease ([facility]) 2023   • Diabetic ulcer of right midfoot associated with diabetes mellitus due to underlying condition, limited to breakdown of skin ([facility]) 06/15/2023   • Hypertensive urgency 2023   • Elevated troponin level not due myocardial infarction 2023   • Stable angina pectoris ([facility]) 2023   • Chronic kidney disease-mineral and  bone disorder 11/30/2022   • Nonrheumatic aortic valve stenosis 11/11/2022   • Gross hematuria 07/26/2022   • Platelets decreased ([facility]) 07/22/2022   • Acute kidney injury superimposed on chronic kidney disease  ([facility]) 02/16/2022   • Actinic keratoses 01/14/2022   • Type 2 diabetes mellitus with diabetic peripheral angiopathy without gangrene, with long-term current use of insulin ([facility]) 01/11/2022   • Varicose veins of left lower extremity 06/25/2021   • History of endovascular stent graft for abdominal aortic aneurysm (AAA) 06/25/2021   • Bruit (arterial) 06/25/2021   • PAD (peripheral artery disease) ([facility]) 06/25/2021   • Type 2 diabetes mellitus with diabetic polyneuropathy, with long-term current use of insulin ([facility]) 06/10/2021   • Mixed hyperlipidemia 05/11/2021   • Primary hypertension 05/11/2021   • Coronary artery disease involving native coronary artery of native heart without angina pectoris 05/11/2021   • S/P angioplasty with stent 05/11/2021   • Hx of CABG 05/11/2021   • S/P AAA repair 05/11/2021   • S/P prostatectomy 05/11/2021   • H/O prostate cancer 05/11/2021     No orders of the defined types were placed in this encounter.

## 2025-03-18 NOTE — PROGRESS NOTES
HBO Treatment Course Details       Treatment Notes: Patient tolerated HBOT well.  Wound care to right great toe- cleansed with NSS applied Woun'Dres followed by gauze, rolled gauze and secured with tape.      Treatment Course Number: 26  Total Treatments Ordered:  40     Diagnosis:   1. Diabetic ulcer of toe of right foot associated with type 2 diabetes mellitus, with necrosis of bone (HCC)  [facility]          [facility] Treatment Details:  In-Patient Visit: no  Treatment Length:90 Minutes(Minutes)  Chamber #: Hard sided Monoplace Chamber    Pre-Treatment details:  Pre-treatment protocol Treatment Protocol: 2.0 INDY X 90 minutes w/ 100% oxygen, two 5 minute air breaks  Left ear clear?: yes  Right ear clear?: yes  Left ear intact?: yes  Right ear intact?: yes  Left ear color: translucent  Right ear color: translucent  PE Tubes present, Left ear?: yes  PE Tubes present, Right ear?: yes  Left ear irrigated?: no  Right ear irrigated?: no  Left ear TEED scale: Grade 0  Right ear TEED scale: Grade 0   Pretreatment heart and lung assessment: Pretreatment heart and lung auscltation unremarkable. Patient cleared for HBOT     Treatment details:  INDY Rate: 2  Started Compression: 1049  Reached Compression: 1056  Total Compression Time: 7 (Minutes)  Total Holding Time: 100 (Minutes)  Started Decompression: 1236  Reached Surface: 1243  Total Decompression Time: 7 (Minutes)  Total Airbreaks:   (Minutes)  Total Time of Treatment: 114 (Minutes)  Symptoms Noted During Treatment: None (Minutes)    Post treatment details:                                                    Vital Signs:  HBO Glucose Reference Range: 100-350 mg/dl   Pre-Treatment Post-Treatment   Time vitals are taken: 1045 Time vitals are taken: 1244   Blood Pressure: 142/82 Blood Pressure: 126/84   Pulse: 76 Pulse: 74   Resp: 15 Resp: 15   Temp: 97.3 °F (36.3 °C) Temp: 96.8 °F (36 °C)   Pre-Inspection Glucose readin Post-Inspection Glucose readin        Allergies   Allergen Reactions    Lisinopril Rash and Lip Swelling     Patient Active Problem List    Diagnosis Date Noted    Constipation 02/21/2025    Gram-positive bacteremia 01/05/2025    Acute metabolic encephalopathy 01/05/2025    Bacteremia 01/04/2025    S/P placement of cardiac pacemaker 12/20/2024    Bradycardia 12/12/2024    Multiple open wounds of lower extremity 12/12/2024    Ulcer of right foot (HCC) 09/07/2024    SOB (shortness of breath) 09/06/2024    Nausea 09/06/2024    Elevated troponin 09/06/2024    History of DVT (deep vein thrombosis) 08/26/2024    Diabetic ulcer of toe of right foot associated with type 2 diabetes mellitus, with fat layer exposed ([facility]) 08/08/2024    Plantar fasciitis, right 07/10/2024    Acute deep vein thrombosis (DVT) of left peroneal vein ([facility]) 05/01/2024    Iron deficiency anemia 04/23/2024    Shortness of breath 04/09/2024    History of colon polyps 02/22/2024    Duodenal ulcer 02/01/2024    Multiple gastric ulcers 12/27/2023    Iron deficiency anemia due to chronic blood loss 12/27/2023    Melena 12/26/2023    Symptomatic anemia 12/26/2023    Frequent PVCs 06/17/2023    Chronic diastolic congestive heart failure ([facility]) 06/16/2023    Acute kidney injury superimposed on stage 3b chronic kidney disease ([facility]) 06/16/2023    Diabetic ulcer of right midfoot associated with diabetes mellitus due to underlying condition, limited to breakdown of skin ([facility]) 06/15/2023    Hypertensive urgency 05/11/2023    Elevated troponin level not due myocardial infarction 05/11/2023    Stable angina pectoris ([facility]) 03/07/2023    Chronic kidney disease-mineral and bone disorder 11/30/2022    Nonrheumatic aortic valve stenosis 11/11/2022    Gross hematuria 07/26/2022    Platelets decreased ([facility]) 07/22/2022    Acute kidney injury superimposed on chronic kidney disease  ([facility]) 02/16/2022    Actinic keratoses 01/14/2022    Type 2 diabetes mellitus with diabetic peripheral angiopathy without  gangrene, with long-term current use of insulin ([facility]) 01/11/2022    Varicose veins of left lower extremity 06/25/2021    History of endovascular stent graft for abdominal aortic aneurysm (AAA) 06/25/2021    Bruit (arterial) 06/25/2021    PAD (peripheral artery disease) ([facility]) 06/25/2021    Type 2 diabetes mellitus with diabetic polyneuropathy, with long-term current use of insulin ([facility]) 06/10/2021    Mixed hyperlipidemia 05/11/2021    Primary hypertension 05/11/2021    Coronary artery disease involving native coronary artery of native heart without angina pectoris 05/11/2021    S/P angioplasty with stent 05/11/2021    Hx of CABG 05/11/2021    S/P AAA repair 05/11/2021    S/P prostatectomy 05/11/2021    H/O prostate cancer 05/11/2021     No orders of the defined types were placed in this encounter.

## 2025-03-19 ENCOUNTER — OFFICE VISIT (OUTPATIENT)
Dept: WOUND CARE | Facility: HOSPITAL | Age: 80
End: 2025-03-19
Payer: COMMERCIAL

## 2025-03-19 DIAGNOSIS — L97.514 DIABETIC ULCER OF TOE OF RIGHT FOOT ASSOCIATED WITH TYPE 2 DIABETES MELLITUS, WITH NECROSIS OF BONE (HCC): ICD-10-CM

## 2025-03-19 DIAGNOSIS — E11.621 DIABETIC ULCER OF TOE OF RIGHT FOOT ASSOCIATED WITH TYPE 2 DIABETES MELLITUS, WITH NECROSIS OF BONE (HCC): ICD-10-CM

## 2025-03-19 LAB
GLUCOSE SERPL-MCNC: 191 MG/DL (ref 65–140)
GLUCOSE SERPL-MCNC: 230 MG/DL (ref 65–140)

## 2025-03-19 PROCEDURE — G0277 HBOT, FULL BODY CHAMBER, 30M: HCPCS | Performed by: STUDENT IN AN ORGANIZED HEALTH CARE EDUCATION/TRAINING PROGRAM

## 2025-03-19 PROCEDURE — 99183 HYPERBARIC OXYGEN THERAPY: CPT | Performed by: STUDENT IN AN ORGANIZED HEALTH CARE EDUCATION/TRAINING PROGRAM

## 2025-03-19 PROCEDURE — 82948 REAGENT STRIP/BLOOD GLUCOSE: CPT | Performed by: STUDENT IN AN ORGANIZED HEALTH CARE EDUCATION/TRAINING PROGRAM

## 2025-03-19 RX ORDER — LOSARTAN POTASSIUM 25 MG/1
25 TABLET ORAL DAILY
Qty: 90 TABLET | Refills: 0 | Status: SHIPPED | OUTPATIENT
Start: 2025-03-19 | End: 2025-03-25

## 2025-03-19 NOTE — PROGRESS NOTES
HBO Treatment Course Details       Treatment Notes: Treatment tolerated well.  No complications.     Treatment Course Number: 27  Total Treatments Ordered:  40     Diagnosis:   1. Diabetic ulcer of toe of right foot associated with type 2 diabetes mellitus, with necrosis of bone (HCC)  Hyperbaric oxygen thearpy          HBO Treatment Details:  In-Patient Visit: no  Treatment Length:90 Minutes(Minutes)  Chamber #: Hard sided Monoplace Chamber    Pre-Treatment details:  Pre-treatment protocol Treatment Protocol: 2.0 INDY X 90 minutes w/ 100% oxygen, two 5 minute air breaks  Left ear clear?: yes  Right ear clear?: yes  Left ear intact?: yes  Right ear intact?: yes  Left ear color: translucent  Right ear color: translucent  PE Tubes present, Left ear?: yes  PE Tubes present, Right ear?: yes  Left ear irrigated?: no  Right ear irrigated?: no  Left ear TEED scale: Grade 0  Right ear TEED scale: Grade 0   Pretreatment heart and lung assessment: Pretreatment heart and lung auscltation unremarkable. Patient cleared for HBOT     Treatment details:  INDY Rate: 2  Started Compression: 1053  Reached Compression: 1100  Total Compression Time: 7 (Minutes)  Total Holding Time: 100 (Minutes)  Started Decompression: 1240  Reached Surface: 1247  Total Decompression Time: 7 (Minutes)  Total Airbreaks:   (Minutes)  Total Time of Treatment: 114 (Minutes)  Symptoms Noted During Treatment: None (Minutes)    Post treatment details:  Left ear clear?: yes  Right ear clear?: yes  Left ears intact?: yes  Right ears intact?: yes  Left ear color: translucent  Right ear color: translucent  PE Tubes present, Left ear?: yes  PE Tubes present, Right ear?: yes        Left ear TEED scale: Grade 0  Right ear TEED scale: Grade 0  Post treatment heart and lung assessment: Post treatment heart and lung auscltation unremarkable. Patient cleared for discharge. Tolerated treatment well.             Vital Signs:  HBO Glucose Reference Range: 100-350 mg/dl    Pre-Treatment Post-Treatment   Time vitals are taken: 1047 Time vitals are taken: 1248   Blood Pressure: 120/80 Blood Pressure: 122/78   Pulse: 84 Pulse: 78   Resp: 16 Resp: 16   Temp: 97.1 °F (36.2 °C) Temp: 96.9 °F (36.1 °C)   Pre-Inspection Glucose readin Post-Inspection Glucose readin       Allergies   Allergen Reactions    Lisinopril Rash and Lip Swelling     Patient Active Problem List    Diagnosis Date Noted    Constipation 2025    Gram-positive bacteremia 2025    Acute metabolic encephalopathy 2025    Bacteremia 2025    S/P placement of cardiac pacemaker 2024    Bradycardia 2024    Multiple open wounds of lower extremity 2024    Ulcer of right foot (HCC) 2024    SOB (shortness of breath) 2024    Nausea 2024    Elevated troponin 2024    History of DVT (deep vein thrombosis) 2024    Diabetic ulcer of toe of right foot associated with type 2 diabetes mellitus, with fat layer exposed (HCC) 2024    Plantar fasciitis, right 07/10/2024    Acute deep vein thrombosis (DVT) of left peroneal vein (HCC) 2024    Iron deficiency anemia 2024    Shortness of breath 2024    History of colon polyps 2024    Duodenal ulcer 2024    Multiple gastric ulcers 2023    Iron deficiency anemia due to chronic blood loss 2023    Melena 2023    Symptomatic anemia 2023    Frequent PVCs 2023    Chronic diastolic congestive heart failure (HCC) 2023    Acute kidney injury superimposed on stage 3b chronic kidney disease (HCC) 2023    Diabetic ulcer of right midfoot associated with diabetes mellitus due to underlying condition, limited to breakdown of skin (HCC) 06/15/2023    Hypertensive urgency 2023    Elevated troponin level not due myocardial infarction 2023    Stable angina pectoris (HCC) 2023    Chronic kidney disease-mineral and bone disorder 2022     Nonrheumatic aortic valve stenosis 11/11/2022    Gross hematuria 07/26/2022    Platelets decreased ([facility]) 07/22/2022    Acute kidney injury superimposed on chronic kidney disease  ([facility]) 02/16/2022    Actinic keratoses 01/14/2022    Type 2 diabetes mellitus with diabetic peripheral angiopathy without gangrene, with long-term current use of insulin ([facility]) 01/11/2022    Varicose veins of left lower extremity 06/25/2021    History of endovascular stent graft for abdominal aortic aneurysm (AAA) 06/25/2021    Bruit (arterial) 06/25/2021    PAD (peripheral artery disease) ([facility]) 06/25/2021    Type 2 diabetes mellitus with diabetic polyneuropathy, with long-term current use of insulin ([facility]) 06/10/2021    Mixed hyperlipidemia 05/11/2021    Primary hypertension 05/11/2021    Coronary artery disease involving native coronary artery of native heart without angina pectoris 05/11/2021    S/P angioplasty with stent 05/11/2021    Hx of CABG 05/11/2021    S/P AAA repair 05/11/2021    S/P prostatectomy 05/11/2021    H/O prostate cancer 05/11/2021     No orders of the defined types were placed in this encounter.

## 2025-03-19 NOTE — PROGRESS NOTES
HBO Treatment Course Details       Treatment Notes: Patient tolerated HBOT well.  Wound care to Right great toe following dive- cleansed with NSS, [place] on gauze, rolled gauze and tape.      Treatment Course Number: 27  Total Treatments Ordered:  40     Diagnosis:   1. Diabetic ulcer of toe of right foot associated with type 2 diabetes mellitus, with necrosis of bone (HCC)  [facility]ic oxygen [facility]          HBO Treatment Details:  In-Patient Visit: no  Treatment Length:90 Minutes(Minutes)  Chamber #: Hard sided Monoplace Chamber    Pre-Treatment details:  Pre-treatment protocol Treatment Protocol: 2.0 INDY X 90 minutes w/ 100% oxygen, two 5 minute air breaks  Left ear clear?: yes  Right ear clear?: yes  Left ear intact?: yes  Right ear intact?: yes  Left ear color: translucent  Right ear color: translucent  PE Tubes present, Left ear?: yes  PE Tubes present, Right ear?: yes  Left ear irrigated?: no  Right ear irrigated?: no  Left ear TEED scale: Grade 0  Right ear TEED scale: Grade 0   Pretreatment heart and lung assessment: Pretreatment heart and lung auscltation unremarkable. Patient cleared for HBOT     Treatment details:  INDY Rate: 2  Started Compression: 1053  Reached Compression: 1100  Total Compression Time: 7 (Minutes)  Total Holding Time: 100 (Minutes)  Started Decompression: 1240  Reached Surface: 1247  Total Decompression Time: 7 (Minutes)  Total Airbreaks:   (Minutes)  Total Time of Treatment: 114 (Minutes)  Symptoms Noted During Treatment: None (Minutes)    Post treatment details:  Left ear clear?: yes  Right ear clear?: yes  Left ears intact?: yes  Right ears intact?: yes  Left ear color: translucent  Right ear color: translucent  PE Tubes present, Left ear?: yes  PE Tubes present, Right ear?: yes        Left ear TEED scale: Grade 0  Right ear TEED scale: Grade 0  Post treatment heart and lung assessment: Post treatment heart and lung auscltation unremarkable. Patient cleared for discharge.  Tolerated treatment well.             Vital Signs:  [facility] Glucose Reference Range: 100-350 mg/dl   Pre-Treatment Post-Treatment   Time vitals are taken: 1047 Time vitals are taken: 1248   Blood Pressure: 120/80 Blood Pressure: 122/78   Pulse: 84 Pulse: 78   Resp: 16 Resp: 16   Temp: 97.1 °F (36.2 °C) Temp: 96.9 °F (36.1 °C)   Pre-Inspection Glucose readin Post-Inspection Glucose readin       Allergies   Allergen Reactions    Lisinopril Rash and Lip Swelling     Patient Active Problem List    Diagnosis Date Noted    Constipation 2025    Gram-positive bacteremia 2025    Acute metabolic encephalopathy 2025    Bacteremia 2025    S/P placement of cardiac pacemaker 2024    Bradycardia 2024    Multiple open wounds of lower extremity 2024    Ulcer of right foot (HCC) 2024    SOB (shortness of breath) 2024    Nausea 2024    Elevated troponin 2024    History of DVT (deep vein thrombosis) 2024    Diabetic ulcer of toe of right foot associated with type 2 diabetes mellitus, with fat layer exposed (HCC) 2024    Plantar fasciitis, right 07/10/2024    Acute deep vein thrombosis (DVT) of left peroneal vein (HCC) 2024    Iron deficiency anemia 2024    Shortness of breath 2024    History of colon polyps 2024    Duodenal ulcer 2024    Multiple gastric ulcers 2023    Iron deficiency anemia due to chronic blood loss 2023    Melena 2023    Symptomatic anemia 2023    Frequent PVCs 2023    Chronic diastolic congestive heart failure (HCC) 2023    Acute kidney injury superimposed on stage 3b chronic kidney disease (HCC) 2023    Diabetic ulcer of right midfoot associated with diabetes mellitus due to underlying condition, limited to breakdown of skin (HCC) 06/15/2023    Hypertensive urgency 2023    Elevated troponin level not due myocardial infarction 2023    Stable angina  pectoris ([facility]) 03/07/2023    Chronic kidney disease-mineral and bone disorder 11/30/2022    Nonrheumatic aortic valve stenosis 11/11/2022    Gross hematuria 07/26/2022    Platelets decreased ([facility]) 07/22/2022    Acute kidney injury superimposed on chronic kidney disease  ([facility]) 02/16/2022    Actinic keratoses 01/14/2022    Type 2 diabetes mellitus with diabetic peripheral angiopathy without gangrene, with long-term current use of insulin ([facility]) 01/11/2022    Varicose veins of left lower extremity 06/25/2021    History of endovascular stent graft for abdominal aortic aneurysm (AAA) 06/25/2021    Bruit (arterial) 06/25/2021    PAD (peripheral artery disease) ([facility]) 06/25/2021    Type 2 diabetes mellitus with diabetic polyneuropathy, with long-term current use of insulin ([facility]) 06/10/2021    Mixed hyperlipidemia 05/11/2021    Primary hypertension 05/11/2021    Coronary artery disease involving native coronary artery of native heart without angina pectoris 05/11/2021    S/P angioplasty with stent 05/11/2021    Hx of CABG 05/11/2021    S/P AAA repair 05/11/2021    S/P prostatectomy 05/11/2021    H/O prostate cancer 05/11/2021     No orders of the defined types were placed in this encounter.

## 2025-03-20 ENCOUNTER — OFFICE VISIT (OUTPATIENT)
Dept: WOUND CARE | Facility: HOSPITAL | Age: 80
End: 2025-03-20
Payer: COMMERCIAL

## 2025-03-20 VITALS
HEART RATE: 93 BPM | DIASTOLIC BLOOD PRESSURE: 77 MMHG | RESPIRATION RATE: 18 BRPM | TEMPERATURE: 96 F | SYSTOLIC BLOOD PRESSURE: 125 MMHG

## 2025-03-20 DIAGNOSIS — E11.621 DIABETIC ULCER OF TOE OF RIGHT FOOT ASSOCIATED WITH TYPE 2 DIABETES MELLITUS, WITH NECROSIS OF BONE (HCC): Primary | ICD-10-CM

## 2025-03-20 DIAGNOSIS — L97.514 DIABETIC ULCER OF TOE OF RIGHT FOOT ASSOCIATED WITH TYPE 2 DIABETES MELLITUS, WITH NECROSIS OF BONE (HCC): Primary | ICD-10-CM

## 2025-03-20 DIAGNOSIS — E11.51 TYPE 2 DIABETES MELLITUS WITH DIABETIC PERIPHERAL ANGIOPATHY WITHOUT GANGRENE, WITH LONG-TERM CURRENT USE OF INSULIN (HCC): ICD-10-CM

## 2025-03-20 DIAGNOSIS — L97.514 DIABETIC ULCER OF TOE OF RIGHT FOOT ASSOCIATED WITH TYPE 2 DIABETES MELLITUS, WITH NECROSIS OF BONE (HCC): ICD-10-CM

## 2025-03-20 DIAGNOSIS — Z79.4 TYPE 2 DIABETES MELLITUS WITH DIABETIC PERIPHERAL ANGIOPATHY WITHOUT GANGRENE, WITH LONG-TERM CURRENT USE OF INSULIN (HCC): ICD-10-CM

## 2025-03-20 DIAGNOSIS — E11.621 DIABETIC ULCER OF TOE OF RIGHT FOOT ASSOCIATED WITH TYPE 2 DIABETES MELLITUS, WITH NECROSIS OF BONE (HCC): ICD-10-CM

## 2025-03-20 LAB
GLUCOSE SERPL-MCNC: 146 MG/DL (ref 65–140)
GLUCOSE SERPL-MCNC: 158 MG/DL (ref 65–140)

## 2025-03-20 PROCEDURE — 97597 DBRDMT OPN WND 1ST 20 CM/<: CPT | Performed by: STUDENT IN AN ORGANIZED HEALTH CARE EDUCATION/TRAINING PROGRAM

## 2025-03-20 PROCEDURE — 82948 REAGENT STRIP/BLOOD GLUCOSE: CPT | Performed by: STUDENT IN AN ORGANIZED HEALTH CARE EDUCATION/TRAINING PROGRAM

## 2025-03-20 PROCEDURE — 99183 HYPERBARIC OXYGEN THERAPY: CPT | Performed by: STUDENT IN AN ORGANIZED HEALTH CARE EDUCATION/TRAINING PROGRAM

## 2025-03-20 PROCEDURE — G0277 HBOT, FULL BODY CHAMBER, 30M: HCPCS | Performed by: STUDENT IN AN ORGANIZED HEALTH CARE EDUCATION/TRAINING PROGRAM

## 2025-03-20 RX ORDER — LIDOCAINE 40 MG/G
CREAM TOPICAL ONCE
Status: COMPLETED | OUTPATIENT
Start: 2025-03-20 | End: 2025-03-20

## 2025-03-20 RX ADMIN — LIDOCAINE: 40 CREAM TOPICAL at 10:07

## 2025-03-20 NOTE — PROGRESS NOTES
HBO Treatment Course Details       Treatment Notes: Treatment tolerated well.  No complications.     Treatment Course Number: 28  Total Treatments Ordered:  40     Diagnosis:   1. Diabetic ulcer of toe of right foot associated with type 2 diabetes mellitus, with necrosis of bone (HCC)  Hyperbaric oxygen thearpy          HBO Treatment Details:  In-Patient Visit: no  Treatment Length:90 Minutes(Minutes)  Chamber #: Hard sided Monoplace Chamber    Pre-Treatment details:  Pre-treatment protocol Treatment Protocol: 2.0 INDY X 90 minutes w/ 100% oxygen, two 5 minute air breaks  Left ear clear?: yes  Right ear clear?: yes  Left ear intact?: yes  Right ear intact?: yes  Left ear color: translucent  Right ear color: translucent  PE Tubes present, Left ear?: yes  PE Tubes present, Right ear?: yes  Left ear irrigated?: no  Right ear irrigated?: no  Left ear TEED scale: Grade 0  Right ear TEED scale: Grade 0   Pretreatment heart and lung assessment: Pretreatment heart and lung auscltation unremarkable. Patient cleared for HBOT     Treatment details:  INDY Rate: 2  Started Compression: 1046  Reached Compression: 1053  Total Compression Time: 7 (Minutes)  Total Holding Time: 100 (Minutes)  Started Decompression: 1233  Reached Surface: 1240  Total Decompression Time: 7 (Minutes)  Total Airbreaks:   (Minutes)  Total Time of Treatment: 114 (Minutes)  Symptoms Noted During Treatment: None (Minutes)    Post treatment details:  Left ear clear?: yes  Right ear clear?: yes  Left ears intact?: yes  Right ears intact?: yes  Left ear color: translucent  Right ear color: translucent  PE Tubes present, Left ear?: yes  PE Tubes present, Right ear?: yes        Left ear TEED scale: Grade 0  Right ear TEED scale: Grade 0  Post treatment heart and lung assessment: Post treatment heart and lung auscltation unremarkable. Patient cleared for discharge. Tolerated treatment well.             Vital Signs:  HBO Glucose Reference Range: 100-350 mg/dl    Pre-Treatment Post-Treatment   Time vitals are taken: 1030 Time vitals are taken: 1242   Blood Pressure: 125/77 Blood Pressure: 110/78   Pulse: 93 Pulse: 64   Resp: 18 Resp: 15   Temp: 96 °F (35.6 °C) Temp: 97.2 °F (36.2 °C)   Pre-Inspection Glucose readin Post-Inspection Glucose readin       Allergies   Allergen Reactions   • Lisinopril Rash and Lip Swelling     Patient Active Problem List    Diagnosis Date Noted   • Constipation 2025   • Gram-positive bacteremia 2025   • Acute metabolic encephalopathy 2025   • Bacteremia 2025   • S/P placement of cardiac pacemaker 2024   • Bradycardia 2024   • Multiple open wounds of lower extremity 2024   • Ulcer of right foot ([facility]) 2024   • SOB (shortness of breath) 2024   • Nausea 2024   • Elevated troponin 2024   • History of DVT (deep vein thrombosis) 2024   • Diabetic ulcer of toe of right foot associated with type 2 diabetes mellitus, with fat layer exposed ([facility]) 2024   • Plantar fasciitis, right 07/10/2024   • Acute deep vein thrombosis (DVT) of left peroneal vein ([facility]) 2024   • Iron deficiency anemia 2024   • Shortness of breath 2024   • History of colon polyps 2024   • Duodenal ulcer 2024   • Multiple gastric ulcers 2023   • Iron deficiency anemia due to chronic blood loss 2023   • Melena 2023   • Symptomatic anemia 2023   • Frequent PVCs 2023   • Chronic diastolic congestive heart failure ([facility]) 2023   • Acute kidney injury superimposed on stage 3b chronic kidney disease ([facility]) 2023   • Diabetic ulcer of right midfoot associated with diabetes mellitus due to underlying condition, limited to breakdown of skin ([facility]) 06/15/2023   • Hypertensive urgency 2023   • Elevated troponin level not due myocardial infarction 2023   • Stable angina pectoris ([facility]) 2023   • Chronic kidney disease-mineral and  bone disorder 11/30/2022   • Nonrheumatic aortic valve stenosis 11/11/2022   • Gross hematuria 07/26/2022   • Platelets decreased ([facility]) 07/22/2022   • Acute kidney injury superimposed on chronic kidney disease  ([facility]) 02/16/2022   • Actinic keratoses 01/14/2022   • Type 2 diabetes mellitus with diabetic peripheral angiopathy without gangrene, with long-term current use of insulin ([facility]) 01/11/2022   • Varicose veins of left lower extremity 06/25/2021   • History of endovascular stent graft for abdominal aortic aneurysm (AAA) 06/25/2021   • Bruit (arterial) 06/25/2021   • PAD (peripheral artery disease) ([facility]) 06/25/2021   • Type 2 diabetes mellitus with diabetic polyneuropathy, with long-term current use of insulin ([facility]) 06/10/2021   • Mixed hyperlipidemia 05/11/2021   • Primary hypertension 05/11/2021   • Coronary artery disease involving native coronary artery of native heart without angina pectoris 05/11/2021   • S/P angioplasty with stent 05/11/2021   • Hx of CABG 05/11/2021   • S/P AAA repair 05/11/2021   • S/P prostatectomy 05/11/2021   • H/O prostate cancer 05/11/2021     No orders of the defined types were placed in this encounter.

## 2025-03-20 NOTE — PROGRESS NOTES
HBO Treatment Course Details       Treatment Notes: Patient tolerated HBOT well.  No complaints.   Wound care was done earlier today at wound care appointment.     Treatment Course Number: 28  Total Treatments Ordered:  40     Diagnosis:   1. Diabetic ulcer of toe of right foot associated with type 2 diabetes mellitus, with necrosis of bone (HCC)  Hyperbaric oxygen thearpy          HBO Treatment Details:  In-Patient Visit: no  Treatment Length:90 Minutes(Minutes)  Chamber #: Hard sided Monoplace Chamber    Pre-Treatment details:  Pre-treatment protocol Treatment Protocol: 2.0 INDY X 90 minutes w/ 100% oxygen, two 5 minute air breaks  Left ear clear?: yes  Right ear clear?: yes  Left ear intact?: yes  Right ear intact?: yes  Left ear color: translucent  Right ear color: translucent  PE Tubes present, Left ear?: yes  PE Tubes present, Right ear?: yes  Left ear irrigated?: no  Right ear irrigated?: no  Left ear TEED scale: Grade 0  Right ear TEED scale: Grade 0   Pretreatment heart and lung assessment: Pretreatment heart and lung auscltation unremarkable. Patient cleared for HBOT     Treatment details:  INDY Rate: 2  Started Compression: 1046  Reached Compression: 1053  Total Compression Time: 7 (Minutes)  Total Holding Time: 100 (Minutes)  Started Decompression: 1233  Reached Surface: 1240  Total Decompression Time: 7 (Minutes)  Total Airbreaks:   (Minutes)  Total Time of Treatment: 114 (Minutes)  Symptoms Noted During Treatment: None (Minutes)    Post treatment details:                                                    Vital Signs:  HBO Glucose Reference Range: 100-350 mg/dl   Pre-Treatment Post-Treatment   Time vitals are taken: 1030 Time vitals are taken: 1242   Blood Pressure: 125/77 Blood Pressure: 110/78   Pulse: 93 Pulse: 64   Resp: 18 Resp: 15   Temp: 96 °F (35.6 °C) Temp: 97.2 °F (36.2 °C)   Pre-Inspection Glucose readin Post-Inspection Glucose readin       Allergies   Allergen Reactions     Lisinopril Rash and Lip Swelling     Patient Active Problem List    Diagnosis Date Noted    Constipation 02/21/2025    Gram-positive bacteremia 01/05/2025    Acute metabolic encephalopathy 01/05/2025    Bacteremia 01/04/2025    S/P placement of cardiac pacemaker 12/20/2024    Bradycardia 12/12/2024    Multiple open wounds of lower extremity 12/12/2024    Ulcer of right foot (HCC) 09/07/2024    SOB (shortness of breath) 09/06/2024    Nausea 09/06/2024    Elevated troponin 09/06/2024    History of DVT (deep vein thrombosis) 08/26/2024    Diabetic ulcer of toe of right foot associated with type 2 diabetes mellitus, with fat layer exposed ([facility]) 08/08/2024    Plantar fasciitis, right 07/10/2024    Acute deep vein thrombosis (DVT) of left peroneal vein ([facility]) 05/01/2024    Iron deficiency anemia 04/23/2024    Shortness of breath 04/09/2024    History of colon polyps 02/22/2024    Duodenal ulcer 02/01/2024    Multiple gastric ulcers 12/27/2023    Iron deficiency anemia due to chronic blood loss 12/27/2023    Melena 12/26/2023    Symptomatic anemia 12/26/2023    Frequent PVCs 06/17/2023    Chronic diastolic congestive heart failure (HCC) 06/16/2023    Acute kidney injury superimposed on stage 3b chronic kidney disease ([facility]) 06/16/2023    Diabetic ulcer of right midfoot associated with diabetes mellitus due to underlying condition, limited to breakdown of skin ([facility]) 06/15/2023    Hypertensive urgency 05/11/2023    Elevated troponin level not due myocardial infarction 05/11/2023    Stable angina pectoris ([facility]) 03/07/2023    Chronic kidney disease-mineral and bone disorder 11/30/2022    Nonrheumatic aortic valve stenosis 11/11/2022    Gross hematuria 07/26/2022    Platelets decreased ([facility]) 07/22/2022    Acute kidney injury superimposed on chronic kidney disease  ([facility]) 02/16/2022    Actinic keratoses 01/14/2022    Type 2 diabetes mellitus with diabetic peripheral angiopathy without gangrene, with long-term current use of insulin  ([facility]) 01/11/2022    Varicose veins of left lower extremity 06/25/2021    History of endovascular stent graft for abdominal aortic aneurysm (AAA) 06/25/2021    Bruit (arterial) 06/25/2021    PAD (peripheral artery disease) ([facility]) 06/25/2021    Type 2 diabetes mellitus with diabetic polyneuropathy, with long-term current use of insulin ([facility]) 06/10/2021    Mixed hyperlipidemia 05/11/2021    Primary hypertension 05/11/2021    Coronary artery disease involving native coronary artery of native heart without angina pectoris 05/11/2021    S/P angioplasty with stent 05/11/2021    Hx of CABG 05/11/2021    S/P AAA repair 05/11/2021    S/P prostatectomy 05/11/2021    H/O prostate cancer 05/11/2021     No orders of the defined types were placed in this encounter.

## 2025-03-20 NOTE — PROGRESS NOTES
Name: Thomas Horne      : 1945      MRN: [id number]  Encounter Provider: David Frye MD  Encounter Date: 3/20/2025   Encounter department: [facility] WOUND CARE  :  Assessment & Plan  Diabetic ulcer of toe of right foot associated with type 2 diabetes mellitus, with necrosis of bone (HCC)  It was a pleasure to see Thomas Horne for wound care follow up today  Selective debridement performed today as above  Wound is improving   Continue plan of care as noted below with woun'dress  No signs or symptoms of infection today. Patient understands that if any signs of infection start (such as increased redness, drainage, pain, fever, chills, diaphoresis), they should call our office or proceed to the ER or Urgent Care.  Patient should continue a high protein diet to facilitate wound healing  Patient is advised to not submerge wound or leave wound open to air.  Follow up in 1 week for wound care. Daily for HBO  Given the multi-factorial nature of wound care, additional time was taken to review patient's treatment plan with other specialties and most recent pertinent lab work and imaging.   All plans of care discussed with patient who verbalized understanding with treatment plan.    Lab Results   Component Value Date    HGBA1C 6.4 (H) 02/15/2025       Orders:  •  lidocaine (LMX) 4 % cream  •  Wound cleansing and dressings Diabetic Ulcer Right;Posterior Toe D1, great; Future  •  Wound Procedure Treatment Diabetic Ulcer Right;Posterior Toe D1, great  •  Debridement Diabetic Ulcer Right;Posterior Toe D1, great    Type 2 diabetes mellitus with diabetic peripheral angiopathy without gangrene, with long-term current use of insulin (HCC)    Lab Results   Component Value Date    HGBA1C 6.4 (H) 02/15/2025     Orders:  •  lidocaine (LMX) 4 % cream  •  Wound cleansing and dressings Diabetic Ulcer Right;Posterior Toe D1, great; Future        History of Present Illness   Chief Complaint   Patient presents  with   • Follow Up Wound Care Visit     Right toe   Here for wound follow up.  80-year-old male here today for follow-up care of diabetic ulcer, Sumner 4, great toe right foot.  Currently also being treated with hyperbaric oxygen therapy for the same      Objective   /77   Pulse 93   Temp (!) 96 °F (35.6 °C)   Resp 18     Physical Exam  Vitals reviewed.   Constitutional:       Appearance: Normal appearance.   HENT:      Head: Normocephalic and atraumatic.   Eyes:      Extraocular Movements: Extraocular movements intact.   Pulmonary:      Effort: Pulmonary effort is normal.   Musculoskeletal:      Cervical back: Neck supple.   Skin:     Comments: Distal right toe wound with less with than last exam.  Healthy wound bed.  Moderate callus deposition surrounding the wound   Neurological:      Mental Status: He is alert.   Psychiatric:         Mood and Affect: Mood normal.       Wound 11/08/24 Diabetic Ulcer Toe D1, great Right;Posterior (Active)   Wound Image   03/20/25 0958   Enter Sumner score: Sumner Grade 4: Partial-foot gangrene 03/20/25 0958   Wound Description Pink;Yellow 03/20/25 0958   Non-staged Wound Description Full thickness 03/20/25 0958   Wound Length (cm) 0.1 cm 03/20/25 0958   Wound Width (cm) 0.7 cm 03/20/25 0958   Wound Depth (cm) 0.1 cm 03/20/25 0958   Wound Surface Area (cm^2) 0.07 cm^2 03/20/25 0958   Wound Volume (cm^3) 0.007 cm^3 03/20/25 0958   Calculated Wound Volume (cm^3) 0.01 cm^3 03/20/25 0958   Change in Wound Size % 83.33 03/20/25 0958   Drainage Amount Small 03/20/25 0958   Drainage Description Serous 03/20/25 0958   Clara-wound Assessment Callus 03/20/25 0958   Dressing Status Intact 03/20/25 0958       Debridement   Wound 11/08/24 Diabetic Ulcer Toe D1, great Right;Posterior     Date/Time: 3/20/2025 9:15 AM  Universal Protocol:  procedure performed by consultantConsent: Verbal consent obtained.  Risks and benefits: risks, benefits and alternatives were discussed  Consent given  "by: patient  Time out: Immediately prior to procedure a \"time out\" was called to verify the correct patient, procedure, equipment, support staff and site/side marked as required.  Patient identity confirmed: verbally with patient    Debridement Details  Performed by: physician  Debridement type: selective  Pain control: lidocaine 4%    Post-debridement measurements  Length (cm): 0.1  Width (cm): 0.5  Depth (cm): 0.1  Percent debrided: 100%  Surface Area (cm^2): 0.05  Area Debrided (cm^2): 0.05  Volume (cm^3): 0.01    Devitalized tissue debrided: biofilm, callus and exudate  Instrument(s) utilized: curette  Bleeding: small  Hemostasis obtained with: pressure  Procedural pain (0-10): 1  Post-procedural pain: 0   Response to treatment: procedure was tolerated well       Results from last 6 Months   Lab Units 01/03/25  1513   WOUND CULTURE  1+ Growth of Staphylococcus aureus*  1+ Growth of             --  David Frye MD    \"This note has been constructed using a voice recognition system. Therefore there may be syntax, spelling, and/or grammatical errors. Occasional wrong word or \"sound alike\" substitutions may have occurred due to the inherent limitations of voice recognition software. Read the chart carefully and recognize, using context, where substitutions have occurred. Please call if you have any questions.\"     "

## 2025-03-20 NOTE — ASSESSMENT & PLAN NOTE
Lab Results   Component Value Date    HGBA1C 6.4 (H) 02/15/2025     Orders:  •  lidocaine (LMX) 4 % cream  •  Wound cleansing and dressings Diabetic Ulcer Right;Posterior Toe D1, great; Future

## 2025-03-21 ENCOUNTER — OFFICE VISIT (OUTPATIENT)
Dept: WOUND CARE | Facility: HOSPITAL | Age: 80
End: 2025-03-21
Payer: COMMERCIAL

## 2025-03-21 DIAGNOSIS — L97.514 DIABETIC ULCER OF TOE OF RIGHT FOOT ASSOCIATED WITH TYPE 2 DIABETES MELLITUS, WITH NECROSIS OF BONE (HCC): ICD-10-CM

## 2025-03-21 DIAGNOSIS — E11.621 DIABETIC ULCER OF TOE OF RIGHT FOOT ASSOCIATED WITH TYPE 2 DIABETES MELLITUS, WITH NECROSIS OF BONE (HCC): ICD-10-CM

## 2025-03-21 LAB — GLUCOSE SERPL-MCNC: 158 MG/DL (ref 65–140)

## 2025-03-21 PROCEDURE — 99183 HYPERBARIC OXYGEN THERAPY: CPT | Performed by: STUDENT IN AN ORGANIZED HEALTH CARE EDUCATION/TRAINING PROGRAM

## 2025-03-21 PROCEDURE — G0277 HBOT, FULL BODY CHAMBER, 30M: HCPCS | Performed by: STUDENT IN AN ORGANIZED HEALTH CARE EDUCATION/TRAINING PROGRAM

## 2025-03-21 PROCEDURE — 82948 REAGENT STRIP/BLOOD GLUCOSE: CPT | Performed by: STUDENT IN AN ORGANIZED HEALTH CARE EDUCATION/TRAINING PROGRAM

## 2025-03-21 NOTE — PROGRESS NOTES
HBO Treatment Course Details       Treatment Notes: Patient tolerated HBOT well.  Wound care to right great toe cleansed with NSS applied Woun'Dres, gauze. Rolled gauze and tape     Treatment Course Number: 29  Total Treatments Ordered:  40     Diagnosis:   1. Diabetic ulcer of toe of right foot associated with type 2 diabetes mellitus, with necrosis of bone (HCC)  Hyperbaric oxygen thearpy          HBO Treatment Details:  In-Patient Visit: no  Treatment Length:90 Minutes(Minutes)  Chamber #: Hard sided Monoplace Chamber    Pre-Treatment details:  Pre-treatment protocol Treatment Protocol: 2.0 INDY X 90 minutes w/ 100% oxygen, two 5 minute air breaks                                             Treatment details:  INDY Rate: 2  Started Compression: 1046  Reached Compression: 1053  Total Compression Time: 7 (Minutes)  Total Holding Time: 100 (Minutes)  Started Decompression: 1233  Reached Surface: 1240  Total Decompression Time: 7 (Minutes)  Total Airbreaks:   (Minutes)  Total Time of Treatment: 114 (Minutes)  Symptoms Noted During Treatment: None (Minutes)    Post treatment details:                                                    Vital Signs:  HBO Glucose Reference Range: 100-350 mg/dl   Pre-Treatment Post-Treatment   Time vitals are taken: 1039 Time vitals are taken: 1241   Blood Pressure: 138/78 Blood Pressure: 102/62   Pulse: 80 Pulse: 62   Resp: 18 Resp: 15   Temp: 96.1 °F (35.6 °C) Temp: 97.1 °F (36.2 °C)   Pre-Inspection Glucose readin Post-Inspection Glucose readin       Allergies   Allergen Reactions    Lisinopril Rash and Lip Swelling     Patient Active Problem List    Diagnosis Date Noted    Constipation 2025    Gram-positive bacteremia 2025    Acute metabolic encephalopathy 2025    Bacteremia 2025    S/P placement of cardiac pacemaker 2024    Bradycardia 2024    Multiple open wounds of lower extremity 2024    Ulcer of right foot (HCC) 2024    SOB  (shortness of breath) 09/06/2024    Nausea 09/06/2024    Elevated troponin 09/06/2024    History of DVT (deep vein thrombosis) 08/26/2024    Diabetic ulcer of toe of right foot associated with type 2 diabetes mellitus, with fat layer exposed ([facility]) 08/08/2024    Plantar fasciitis, right 07/10/2024    Acute deep vein thrombosis (DVT) of left peroneal vein ([facility]) 05/01/2024    Iron deficiency anemia 04/23/2024    Shortness of breath 04/09/2024    History of colon polyps 02/22/2024    Duodenal ulcer 02/01/2024    Multiple gastric ulcers 12/27/2023    Iron deficiency anemia due to chronic blood loss 12/27/2023    Melena 12/26/2023    Symptomatic anemia 12/26/2023    Frequent PVCs 06/17/2023    Chronic diastolic congestive heart failure ([facility]) 06/16/2023    Acute kidney injury superimposed on stage 3b chronic kidney disease ([facility]) 06/16/2023    Diabetic ulcer of right midfoot associated with diabetes mellitus due to underlying condition, limited to breakdown of skin ([facility]) 06/15/2023    Hypertensive urgency 05/11/2023    Elevated troponin level not due myocardial infarction 05/11/2023    Stable angina pectoris ([facility]) 03/07/2023    Chronic kidney disease-mineral and bone disorder 11/30/2022    Nonrheumatic aortic valve stenosis 11/11/2022    Gross hematuria 07/26/2022    Platelets decreased ([facility]) 07/22/2022    Acute kidney injury superimposed on chronic kidney disease  ([facility]) 02/16/2022    Actinic keratoses 01/14/2022    Type 2 diabetes mellitus with diabetic peripheral angiopathy without gangrene, with long-term current use of insulin ([facility]) 01/11/2022    Varicose veins of left lower extremity 06/25/2021    History of endovascular stent graft for abdominal aortic aneurysm (AAA) 06/25/2021    Bruit (arterial) 06/25/2021    PAD (peripheral artery disease) ([facility]) 06/25/2021    Type 2 diabetes mellitus with diabetic polyneuropathy, with long-term current use of insulin ([facility]) 06/10/2021    Mixed hyperlipidemia 05/11/2021    Primary  hypertension 05/11/2021    Coronary artery disease involving native coronary artery of native heart without angina pectoris 05/11/2021    S/P angioplasty with stent 05/11/2021    Hx of CABG 05/11/2021    S/P AAA repair 05/11/2021    S/P prostatectomy 05/11/2021    H/O prostate cancer 05/11/2021     No orders of the defined types were placed in this encounter.

## 2025-03-22 LAB — GLUCOSE SERPL-MCNC: 176 MG/DL (ref 65–140)

## 2025-03-24 ENCOUNTER — OFFICE VISIT (OUTPATIENT)
Dept: WOUND CARE | Facility: HOSPITAL | Age: 80
End: 2025-03-24
Payer: COMMERCIAL

## 2025-03-24 ENCOUNTER — TELEPHONE (OUTPATIENT)
Age: 80
End: 2025-03-24

## 2025-03-24 DIAGNOSIS — L97.514 DIABETIC ULCER OF TOE OF RIGHT FOOT ASSOCIATED WITH TYPE 2 DIABETES MELLITUS, WITH NECROSIS OF BONE (HCC): ICD-10-CM

## 2025-03-24 DIAGNOSIS — E11.621 DIABETIC ULCER OF TOE OF RIGHT FOOT ASSOCIATED WITH TYPE 2 DIABETES MELLITUS, WITH NECROSIS OF BONE (HCC): ICD-10-CM

## 2025-03-24 LAB
GLUCOSE SERPL-MCNC: 137 MG/DL (ref 65–140)
GLUCOSE SERPL-MCNC: 151 MG/DL (ref 65–140)

## 2025-03-24 PROCEDURE — 99183 HYPERBARIC OXYGEN THERAPY: CPT | Performed by: STUDENT IN AN ORGANIZED HEALTH CARE EDUCATION/TRAINING PROGRAM

## 2025-03-24 PROCEDURE — G0277 HBOT, FULL BODY CHAMBER, 30M: HCPCS | Performed by: STUDENT IN AN ORGANIZED HEALTH CARE EDUCATION/TRAINING PROGRAM

## 2025-03-24 PROCEDURE — 82948 REAGENT STRIP/BLOOD GLUCOSE: CPT | Performed by: STUDENT IN AN ORGANIZED HEALTH CARE EDUCATION/TRAINING PROGRAM

## 2025-03-24 NOTE — TELEPHONE ENCOUNTER
Please let Thomas know that I reviewed his BP readings at wound care and they appear to be under control. I recommend that he hold off on taking the Losartan for now. Please remove it from his med list.

## 2025-03-24 NOTE — TELEPHONE ENCOUNTER
Patient states he received a call from the pharmacy to  losartan. Patient states this was discontinued by Dr. Fischer. Per med list losartan refill was sen tin by cardiologist. Telephone encounter on 3-18 [facility] contacted cardiology for a refill.   Patient asking if he should remain off of losartan or contact cardiology. States he did not receive a call regarding medication from cardiology. Please advise, thank you.

## 2025-03-24 NOTE — PROGRESS NOTES
HBO Treatment Course Details       Treatment Notes: Patient tolerated HBOT well. Wound care to right great toe cleansed with NSS, applied Woun'Dres applied gauze and rolled gauze and tape.  Patient tolerated well.      Treatment Course Number: 30  Total Treatments Ordered:  40     Diagnosis:   1. Diabetic ulcer of toe of right foot associated with type 2 diabetes mellitus, with necrosis of bone (HCC)  [facility]          H[facility] Treatment Details:  In-Patient Visit: no  Treatment Length:90 Minutes(Minutes)  Chamber #: Hard sided Monoplace Chamber    Pre-Treatment details:  Pre-treatment protocol Treatment Protocol: 2.0 INDY X 90 minutes w/ 100% oxygen, two 5 minute air breaks  Left ear clear?: yes  Right ear clear?: yes  Left ear intact?: yes  Right ear intact?: yes  Left ear color: translucent  Right ear color: translucent  PE Tubes present, Left ear?: yes  PE Tubes present, Right ear?: yes  Left ear irrigated?: no  Right ear irrigated?: no  Left ear TEED scale: Grade 0  Right ear TEED scale: Grade 0   Pretreatment heart and lung assessment: Pretreatment heart and lung auscltation unremarkable. Patient cleared for HBOT     Treatment details:  INDY Rate: 2  Started Compression: 1055  Reached Compression: 1103  Total Compression Time: 8 (Minutes)  Total Holding Time: 100 (Minutes)  Started Decompression: 1243  Reached Surface: 1252  Total Decompression Time: 9 (Minutes)  Total Airbreaks:   (Minutes)  Total Time of Treatment: 117 (Minutes)  Symptoms Noted During Treatment: None (Minutes)    Post treatment details:                                                    Vital Signs:  HBO Glucose Reference Range: 100-350 mg/dl   Pre-Treatment Post-Treatment   Time vitals are taken: 1050 Time vitals are taken: 1253   Blood Pressure: 158/78 Blood Pressure: 138/72   Pulse: 70 Pulse: 80   Resp: 18 Resp: 15   Temp: 96.6 °F (35.9 °C) Temp: 97 °F (36.1 °C)   Pre-Inspection Glucose readin Post-Inspection Glucose  readin       Allergies   Allergen Reactions    Lisinopril Rash and Lip Swelling     Patient Active Problem List    Diagnosis Date Noted    Constipation 2025    Gram-positive bacteremia 2025    Acute metabolic encephalopathy 2025    Bacteremia 2025    S/P placement of cardiac pacemaker 2024    Bradycardia 2024    Multiple open wounds of lower extremity 2024    Ulcer of right foot (HCC) 2024    SOB (shortness of breath) 2024    Nausea 2024    Elevated troponin 2024    History of DVT (deep vein thrombosis) 2024    Diabetic ulcer of toe of right foot associated with type 2 diabetes mellitus, with fat layer exposed ([facility]) 2024    Plantar fasciitis, right 07/10/2024    Acute deep vein thrombosis (DVT) of left peroneal vein ([facility]) 2024    Iron deficiency anemia 2024    Shortness of breath 2024    History of colon polyps 2024    Duodenal ulcer 2024    Multiple gastric ulcers 2023    Iron deficiency anemia due to chronic blood loss 2023    Melena 2023    Symptomatic anemia 2023    Frequent PVCs 2023    Chronic diastolic congestive heart failure ([facility]) 2023    Acute kidney injury superimposed on stage 3b chronic kidney disease ([facility]) 2023    Diabetic ulcer of right midfoot associated with diabetes mellitus due to underlying condition, limited to breakdown of skin ([facility]) 06/15/2023    Hypertensive urgency 2023    Elevated troponin level not due myocardial infarction 2023    Stable angina pectoris ([facility]) 2023    Chronic kidney disease-mineral and bone disorder 2022    Nonrheumatic aortic valve stenosis 2022    Gross hematuria 2022    Platelets decreased ([facility]) 2022    Acute kidney injury superimposed on chronic kidney disease  ([facility]) 2022    Actinic keratoses 2022    Type 2 diabetes mellitus with diabetic peripheral angiopathy  without gangrene, with long-term current use of insulin ([facility]) 01/11/2022    Varicose veins of left lower extremity 06/25/2021    History of endovascular stent graft for abdominal aortic aneurysm (AAA) 06/25/2021    Bruit (arterial) 06/25/2021    PAD (peripheral artery disease) ([facility]) 06/25/2021    Type 2 diabetes mellitus with diabetic polyneuropathy, with long-term current use of insulin ([facility]) 06/10/2021    Mixed hyperlipidemia 05/11/2021    Primary hypertension 05/11/2021    Coronary artery disease involving native coronary artery of native heart without angina pectoris 05/11/2021    S/P angioplasty with stent 05/11/2021    Hx of CABG 05/11/2021    S/P AAA repair 05/11/2021    S/P prostatectomy 05/11/2021    H/O prostate cancer 05/11/2021     No orders of the defined types were placed in this encounter.

## 2025-03-24 NOTE — PROGRESS NOTES
HBO Treatment Course Details       Treatment Notes: Treatment tolerated well.  No complications.     Treatment Course Number: 30  Total Treatments Ordered:  40     Diagnosis:   1. Diabetic ulcer of toe of right foot associated with type 2 diabetes mellitus, with necrosis of bone (HCC)  Hyperbaric oxygen thearpy          HBO Treatment Details:  In-Patient Visit: no  Treatment Length:90 Minutes(Minutes)  Chamber #: Hard sided Monoplace Chamber    Pre-Treatment details:  Pre-treatment protocol Treatment Protocol: 2.0 INDY X 90 minutes w/ 100% oxygen, two 5 minute air breaks  Left ear clear?: yes  Right ear clear?: yes  Left ear intact?: yes  Right ear intact?: yes  Left ear color: translucent  Right ear color: translucent  PE Tubes present, Left ear?: yes  PE Tubes present, Right ear?: yes  Left ear irrigated?: no  Right ear irrigated?: no  Left ear TEED scale: Grade 0  Right ear TEED scale: Grade 0   Pretreatment heart and lung assessment: Pretreatment heart and lung auscltation unremarkable. Patient cleared for HBOT     Treatment details:  INDY Rate: 2  Started Compression: 1055  Reached Compression: 1103  Total Compression Time: 8 (Minutes)  Total Holding Time: 100 (Minutes)  Started Decompression: 1243  Reached Surface: 1252  Total Decompression Time: 9 (Minutes)  Total Airbreaks:   (Minutes)  Total Time of Treatment: 117 (Minutes)  Symptoms Noted During Treatment: None (Minutes)    Post treatment details:  Left ear clear?: yes  Right ear clear?: yes  Left ears intact?: yes  Right ears intact?: yes  Left ear color: translucent  Right ear color: translucent  PE Tubes present, Left ear?: yes  PE Tubes present, Right ear?: yes        Left ear TEED scale: Grade 0  Right ear TEED scale: Grade 0  Post treatment heart and lung assessment: Post treatment heart and lung auscltation unremarkable. Patient cleared for discharge. Tolerated treatment well.             Vital Signs:  HBO Glucose Reference Range: 100-350 mg/dl    Pre-Treatment Post-Treatment   Time vitals are taken: 1050 Time vitals are taken: 1253   Blood Pressure: 158/78 Blood Pressure: 138/72   Pulse: 70 Pulse: 80   Resp: 18 Resp: 15   Temp: 96.6 °F (35.9 °C) Temp: 97 °F (36.1 °C)   Pre-Inspection Glucose readin Post-Inspection Glucose readin       Allergies   Allergen Reactions   • Lisinopril Rash and Lip Swelling     Patient Active Problem List    Diagnosis Date Noted   • Constipation 2025   • Gram-positive bacteremia 2025   • Acute metabolic encephalopathy 2025   • Bacteremia 2025   • S/P placement of cardiac pacemaker 2024   • Bradycardia 2024   • Multiple open wounds of lower extremity 2024   • Ulcer of right foot ([facility]) 2024   • SOB (shortness of breath) 2024   • Nausea 2024   • Elevated troponin 2024   • History of DVT (deep vein thrombosis) 2024   • Diabetic ulcer of toe of right foot associated with type 2 diabetes mellitus, with fat layer exposed ([facility]) 2024   • Plantar fasciitis, right 07/10/2024   • Acute deep vein thrombosis (DVT) of left peroneal vein ([facility]) 2024   • Iron deficiency anemia 2024   • Shortness of breath 2024   • History of colon polyps 2024   • Duodenal ulcer 2024   • Multiple gastric ulcers 2023   • Iron deficiency anemia due to chronic blood loss 2023   • Melena 2023   • Symptomatic anemia 2023   • Frequent PVCs 2023   • Chronic diastolic congestive heart failure ([facility]) 2023   • Acute kidney injury superimposed on stage 3b chronic kidney disease ([facility]) 2023   • Diabetic ulcer of right midfoot associated with diabetes mellitus due to underlying condition, limited to breakdown of skin ([facility]) 06/15/2023   • Hypertensive urgency 2023   • Elevated troponin level not due myocardial infarction 2023   • Stable angina pectoris ([facility]) 2023   • Chronic kidney disease-mineral and  bone disorder 11/30/2022   • Nonrheumatic aortic valve stenosis 11/11/2022   • Gross hematuria 07/26/2022   • Platelets decreased ([facility]) 07/22/2022   • Acute kidney injury superimposed on chronic kidney disease  ([facility]) 02/16/2022   • Actinic keratoses 01/14/2022   • Type 2 diabetes mellitus with diabetic peripheral angiopathy without gangrene, with long-term current use of insulin ([facility]) 01/11/2022   • Varicose veins of left lower extremity 06/25/2021   • History of endovascular stent graft for abdominal aortic aneurysm (AAA) 06/25/2021   • Bruit (arterial) 06/25/2021   • PAD (peripheral artery disease) ([facility]) 06/25/2021   • Type 2 diabetes mellitus with diabetic polyneuropathy, with long-term current use of insulin ([facility]) 06/10/2021   • Mixed hyperlipidemia 05/11/2021   • Primary hypertension 05/11/2021   • Coronary artery disease involving native coronary artery of native heart without angina pectoris 05/11/2021   • S/P angioplasty with stent 05/11/2021   • Hx of CABG 05/11/2021   • S/P AAA repair 05/11/2021   • S/P prostatectomy 05/11/2021   • H/O prostate cancer 05/11/2021     No orders of the defined types were placed in this encounter.

## 2025-03-25 ENCOUNTER — OFFICE VISIT (OUTPATIENT)
Dept: WOUND CARE | Facility: HOSPITAL | Age: 80
End: 2025-03-25
Payer: COMMERCIAL

## 2025-03-25 DIAGNOSIS — E11.621 DIABETIC ULCER OF TOE OF RIGHT FOOT ASSOCIATED WITH TYPE 2 DIABETES MELLITUS, WITH NECROSIS OF BONE (HCC): ICD-10-CM

## 2025-03-25 DIAGNOSIS — L97.514 DIABETIC ULCER OF TOE OF RIGHT FOOT ASSOCIATED WITH TYPE 2 DIABETES MELLITUS, WITH NECROSIS OF BONE (HCC): ICD-10-CM

## 2025-03-25 LAB
GLUCOSE SERPL-MCNC: 192 MG/DL (ref 65–140)
GLUCOSE SERPL-MCNC: 210 MG/DL (ref 65–140)

## 2025-03-25 PROCEDURE — 99183 HYPERBARIC OXYGEN THERAPY: CPT | Performed by: STUDENT IN AN ORGANIZED HEALTH CARE EDUCATION/TRAINING PROGRAM

## 2025-03-25 PROCEDURE — 82948 REAGENT STRIP/BLOOD GLUCOSE: CPT | Performed by: STUDENT IN AN ORGANIZED HEALTH CARE EDUCATION/TRAINING PROGRAM

## 2025-03-25 PROCEDURE — G0277 HBOT, FULL BODY CHAMBER, 30M: HCPCS | Performed by: STUDENT IN AN ORGANIZED HEALTH CARE EDUCATION/TRAINING PROGRAM

## 2025-03-25 NOTE — PROGRESS NOTES
HBO Treatment Course Details       Treatment Notes: Treatment tolerated well.  No complications.     Treatment Course Number: 31  Total Treatments Ordered:  40     Diagnosis:   1. Diabetic ulcer of toe of right foot associated with type 2 diabetes mellitus, with necrosis of bone (HCC)  Hyperbaric oxygen thearpy          HBO Treatment Details:  In-Patient Visit: no  Treatment Length:90 Minutes(Minutes)  Chamber #: Hard sided Monoplace Chamber    Pre-Treatment details:  Pre-treatment protocol Treatment Protocol: 2.0 INDY X 90 minutes w/ 100% oxygen, two 5 minute air breaks  Left ear clear?: yes  Right ear clear?: yes  Left ear intact?: yes  Right ear intact?: yes  Left ear color: translucent  Right ear color: translucent  PE Tubes present, Left ear?: yes  PE Tubes present, Right ear?: yes  Left ear irrigated?: no  Right ear irrigated?: no  Left ear TEED scale: Grade 0  Right ear TEED scale: Grade 0   Pretreatment heart and lung assessment: Pretreatment heart and lung auscltation unremarkable. Patient cleared for HBOT     Treatment details:  INDY Rate: 2  Started Compression: 1053  Reached Compression: 1103  Total Compression Time: 10 (Minutes)  Total Holding Time: 100 (Minutes)  Started Decompression: 1243  Reached Surface: 1253  Total Decompression Time: 10 (Minutes)  Total Airbreaks:   (Minutes)  Total Time of Treatment: 120 (Minutes)  Symptoms Noted During Treatment: None (Minutes)    Post treatment details:  Left ear clear?: yes  Right ear clear?: yes  Left ears intact?: yes  Right ears intact?: yes  Left ear color: translucent  Right ear color: translucent  PE Tubes present, Left ear?: yes  PE Tubes present, Right ear?: yes        Left ear TEED scale: Grade 0  Right ear TEED scale: Grade 0  Post treatment heart and lung assessment: Post treatment heart and lung auscltation unremarkable. Patient cleared for discharge. Tolerated treatment well.             Vital Signs:  HBO Glucose Reference Range: 100-350 mg/dl    Pre-Treatment Post-Treatment   Time vitals are taken: 1045 Time vitals are taken: 1247   Blood Pressure: 138/82 Blood Pressure: 108/76   Pulse: 74 Pulse: 62   Resp: 18 Resp: 16   Temp: 96.9 °F (36.1 °C) Temp: 97 °F (36.1 °C)   Pre-Inspection Glucose readin Post-Inspection Glucose readin       Allergies   Allergen Reactions   • Lisinopril Rash and Lip Swelling     Patient Active Problem List    Diagnosis Date Noted   • Constipation 2025   • Gram-positive bacteremia 2025   • Acute metabolic encephalopathy 2025   • Bacteremia 2025   • S/P placement of cardiac pacemaker 2024   • Bradycardia 2024   • Multiple open wounds of lower extremity 2024   • Ulcer of right foot ([facility]) 2024   • SOB (shortness of breath) 2024   • Nausea 2024   • Elevated troponin 2024   • History of DVT (deep vein thrombosis) 2024   • Diabetic ulcer of left foot associated with type 2 diabetes mellitus, with muscle involvement without evidence of necrosis ([facility]) 2024   • Plantar fasciitis, right 07/10/2024   • Acute deep vein thrombosis (DVT) of left peroneal vein ([facility]) 2024   • Iron deficiency anemia 2024   • Shortness of breath 2024   • History of colon polyps 2024   • Duodenal ulcer 2024   • Multiple gastric ulcers 2023   • Iron deficiency anemia due to chronic blood loss 2023   • Melena 2023   • Symptomatic anemia 2023   • Frequent PVCs 2023   • Chronic diastolic congestive heart failure ([facility]) 2023   • Acute kidney injury superimposed on stage 3b chronic kidney disease ([facility]) 2023   • Diabetic ulcer of right midfoot associated with diabetes mellitus due to underlying condition, limited to breakdown of skin ([facility]) 06/15/2023   • Hypertensive urgency 2023   • Elevated troponin level not due myocardial infarction 2023   • Stable angina pectoris ([facility]) 2023   • Chronic kidney  disease-mineral and bone disorder 11/30/2022   • Nonrheumatic aortic valve stenosis 11/11/2022   • Gross hematuria 07/26/2022   • Platelets decreased ([facility]) 07/22/2022   • Acute kidney injury superimposed on chronic kidney disease  ([facility]) 02/16/2022   • Actinic keratoses 01/14/2022   • Type 2 diabetes mellitus with diabetic peripheral angiopathy without gangrene, with long-term current use of insulin ([facility]) 01/11/2022   • Varicose veins of left lower extremity 06/25/2021   • History of endovascular stent graft for abdominal aortic aneurysm (AAA) 06/25/2021   • Bruit (arterial) 06/25/2021   • PAD (peripheral artery disease) ([facility]) 06/25/2021   • Type 2 diabetes mellitus with diabetic polyneuropathy, with long-term current use of insulin ([facility]) 06/10/2021   • Mixed hyperlipidemia 05/11/2021   • Primary hypertension 05/11/2021   • Coronary artery disease involving native coronary artery of native heart without angina pectoris 05/11/2021   • S/P angioplasty with stent 05/11/2021   • Hx of CABG 05/11/2021   • S/P AAA repair 05/11/2021   • S/P prostatectomy 05/11/2021   • H/O prostate cancer 05/11/2021     No orders of the defined types were placed in this encounter.

## 2025-03-25 NOTE — PROGRESS NOTES
HBO Treatment Course Details       Treatment Notes: Treatment tolerated well. No complications     Treatment Course Number: 29  Total Treatments Ordered:  40     Diagnosis:   1. Diabetic ulcer of toe of right foot associated with type 2 diabetes mellitus, with necrosis of bone (HCC)  Hyperbaric oxygen thearpy          HBO Treatment Details:  In-Patient Visit:   Treatment Length:90 Minutes(Minutes)  Chamber #: Hard sided Monoplace Chamber    Pre-Treatment details:  Pre-treatment protocol Treatment Protocol: 2.0 INDY X 90 minutes w/ 100% oxygen, two 5 minute air breaks  Left ear clear?: yes  Right ear clear?: yes  Left ear intact?: yes  Right ear intact?: yes  Left ear color: translucent  Right ear color: translucent  PE Tubes present, Left ear?: yes  PE Tubes present, Right ear?: yes  Left ear irrigated?: no  Right ear irrigated?: no  Left ear TEED scale: Grade 0  Right ear TEED scale: Grade 0   Pretreatment heart and lung assessment: Pretreatment heart and lung auscltation unremarkable. Patient cleared for HBOT     Treatment details:  INDY Rate: 2  Started Compression: 1046  Reached Compression: 1053  Total Compression Time: 7 (Minutes)  Total Holding Time: 100 (Minutes)  Started Decompression: 1233  Reached Surface: 1240  Total Decompression Time: 7 (Minutes)  Total Airbreaks:   (Minutes)  Total Time of Treatment: 114 (Minutes)  Symptoms Noted During Treatment: None (Minutes)    Post treatment details:  Left ear clear?: yes  Right ear clear?: yes  Left ears intact?: yes  Right ears intact?: yes  Left ear color: translucent  Right ear color: translucent  PE Tubes present, Left ear?: yes  PE Tubes present, Right ear?: yes        Left ear TEED scale: Grade 0  Right ear TEED scale: Grade 0  Post treatment heart and lung assessment: Post treatment heart and lung auscltation unremarkable. Patient cleared for discharge. Tolerated treatment well.             Vital Signs:  HBO Glucose Reference Range: 100-350 mg/dl    Pre-Treatment Post-Treatment   Time vitals are taken: 1039 Time vitals are taken: 1241   Blood Pressure: 138/78 Blood Pressure: 102/62   Pulse: 80 Pulse: 62   Resp: 18 Resp: 15   Temp: 96.1 °F (35.6 °C) Temp: 97.1 °F (36.2 °C)   Pre-Inspection Glucose readin Post-Inspection Glucose readin       Allergies   Allergen Reactions   • Lisinopril Rash and Lip Swelling     Patient Active Problem List    Diagnosis Date Noted   • Constipation 2025   • Gram-positive bacteremia 2025   • Acute metabolic encephalopathy 2025   • Bacteremia 2025   • S/P placement of cardiac pacemaker 2024   • Bradycardia 2024   • Multiple open wounds of lower extremity 2024   • Ulcer of right foot ([facility]) 2024   • SOB (shortness of breath) 2024   • Nausea 2024   • Elevated troponin 2024   • History of DVT (deep vein thrombosis) 2024   • Diabetic ulcer of toe of right foot associated with type 2 diabetes mellitus, with fat layer exposed ([facility]) 2024   • Plantar fasciitis, right 07/10/2024   • Acute deep vein thrombosis (DVT) of left peroneal vein ([facility]) 2024   • Iron deficiency anemia 2024   • Shortness of breath 2024   • History of colon polyps 2024   • Duodenal ulcer 2024   • Multiple gastric ulcers 2023   • Iron deficiency anemia due to chronic blood loss 2023   • Melena 2023   • Symptomatic anemia 2023   • Frequent PVCs 2023   • Chronic diastolic congestive heart failure ([facility]) 2023   • Acute kidney injury superimposed on stage 3b chronic kidney disease ([facility]) 2023   • Diabetic ulcer of right midfoot associated with diabetes mellitus due to underlying condition, limited to breakdown of skin ([facility]) 06/15/2023   • Hypertensive urgency 2023   • Elevated troponin level not due myocardial infarction 2023   • Stable angina pectoris ([facility]) 2023   • Chronic kidney disease-mineral and  bone disorder 11/30/2022   • Nonrheumatic aortic valve stenosis 11/11/2022   • Gross hematuria 07/26/2022   • Platelets decreased ([facility]) 07/22/2022   • Acute kidney injury superimposed on chronic kidney disease  ([facility]) 02/16/2022   • Actinic keratoses 01/14/2022   • Type 2 diabetes mellitus with diabetic peripheral angiopathy without gangrene, with long-term current use of insulin ([facility]) 01/11/2022   • Varicose veins of left lower extremity 06/25/2021   • History of endovascular stent graft for abdominal aortic aneurysm (AAA) 06/25/2021   • Bruit (arterial) 06/25/2021   • PAD (peripheral artery disease) ([facility]) 06/25/2021   • Type 2 diabetes mellitus with diabetic polyneuropathy, with long-term current use of insulin ([facility]) 06/10/2021   • Mixed hyperlipidemia 05/11/2021   • Primary hypertension 05/11/2021   • Coronary artery disease involving native coronary artery of native heart without angina pectoris 05/11/2021   • S/P angioplasty with stent 05/11/2021   • Hx of CABG 05/11/2021   • S/P AAA repair 05/11/2021   • S/P prostatectomy 05/11/2021   • H/O prostate cancer 05/11/2021     No orders of the defined types were placed in this encounter.

## 2025-03-25 NOTE — PROGRESS NOTES
HBO Treatment Course Details       Treatment Notes: Patient tolerated HBOT well.  No complaints.  Wound dressing to right great toe- cleansed with saline, [place] applied- gauze- rolled gauze and tape.      Treatment Course Number: 31  Total Treatments Ordered:  40     Diagnosis:   1. Diabetic ulcer of toe of right foot associated with type 2 diabetes mellitus, with necrosis of bone (HCC)  [facility]          H[facility] Treatment Details:  In-Patient Visit: no  Treatment Length:90 Minutes(Minutes)  Chamber #: Hard sided Monoplace Chamber    Pre-Treatment details:  Pre-treatment protocol Treatment Protocol: 2.0 INDY X 90 minutes w/ 100% oxygen, two 5 minute air breaks  Left ear clear?: yes  Right ear clear?: yes  Left ear intact?: yes  Right ear intact?: yes  Left ear color: translucent  Right ear color: translucent  PE Tubes present, Left ear?: yes  PE Tubes present, Right ear?: yes  Left ear irrigated?: no  Right ear irrigated?: no  Left ear TEED scale: Grade 0  Right ear TEED scale: Grade 0   Pretreatment heart and lung assessment: Pretreatment heart and lung auscltation unremarkable. Patient cleared for HBOT     Treatment details:  INDY Rate: 2  Started Compression: 1053  Reached Compression: 1103  Total Compression Time: 10 (Minutes)  Total Holding Time: 100 (Minutes)  Started Decompression: 1243  Reached Surface: 1253  Total Decompression Time: 10 (Minutes)  Total Airbreaks:   (Minutes)  Total Time of Treatment: 120 (Minutes)  Symptoms Noted During Treatment: None (Minutes)    Post treatment details:                                                    Vital Signs:  HBO Glucose Reference Range: 100-350 mg/dl   Pre-Treatment Post-Treatment   Time vitals are taken: 1045 Time vitals are taken: 1247   Blood Pressure: 138/82 Blood Pressure: 108/76   Pulse: 74 Pulse: 62   Resp: 18 Resp: 16   Temp: 96.9 °F (36.1 °C) Temp: 97 °F (36.1 °C)   Pre-Inspection Glucose readin Post-Inspection Glucose readin        Allergies   Allergen Reactions    Lisinopril Rash and Lip Swelling     Patient Active Problem List    Diagnosis Date Noted    Constipation 02/21/2025    Gram-positive bacteremia 01/05/2025    Acute metabolic encephalopathy 01/05/2025    Bacteremia 01/04/2025    S/P placement of cardiac pacemaker 12/20/2024    Bradycardia 12/12/2024    Multiple open wounds of lower extremity 12/12/2024    Ulcer of right foot (HCC) 09/07/2024    SOB (shortness of breath) 09/06/2024    Nausea 09/06/2024    Elevated troponin 09/06/2024    History of DVT (deep vein thrombosis) 08/26/2024    Diabetic ulcer of left foot associated with type 2 diabetes mellitus, with muscle involvement without evidence of necrosis ([facility]) 08/08/2024    Plantar fasciitis, right 07/10/2024    Acute deep vein thrombosis (DVT) of left peroneal vein ([facility]) 05/01/2024    Iron deficiency anemia 04/23/2024    Shortness of breath 04/09/2024    History of colon polyps 02/22/2024    Duodenal ulcer 02/01/2024    Multiple gastric ulcers 12/27/2023    Iron deficiency anemia due to chronic blood loss 12/27/2023    Melena 12/26/2023    Symptomatic anemia 12/26/2023    Frequent PVCs 06/17/2023    Chronic diastolic congestive heart failure ([facility]) 06/16/2023    Acute kidney injury superimposed on stage 3b chronic kidney disease ([facility]) 06/16/2023    Diabetic ulcer of right midfoot associated with diabetes mellitus due to underlying condition, limited to breakdown of skin ([facility]) 06/15/2023    Hypertensive urgency 05/11/2023    Elevated troponin level not due myocardial infarction 05/11/2023    Stable angina pectoris ([facility]) 03/07/2023    Chronic kidney disease-mineral and bone disorder 11/30/2022    Nonrheumatic aortic valve stenosis 11/11/2022    Gross hematuria 07/26/2022    Platelets decreased ([facility]) 07/22/2022    Acute kidney injury superimposed on chronic kidney disease  ([facility]) 02/16/2022    Actinic keratoses 01/14/2022    Type 2 diabetes mellitus with diabetic peripheral  angiopathy without gangrene, with long-term current use of insulin ([facility]) 01/11/2022    Varicose veins of left lower extremity 06/25/2021    History of endovascular stent graft for abdominal aortic aneurysm (AAA) 06/25/2021    Bruit (arterial) 06/25/2021    PAD (peripheral artery disease) ([facility]) 06/25/2021    Type 2 diabetes mellitus with diabetic polyneuropathy, with long-term current use of insulin ([facility]) 06/10/2021    Mixed hyperlipidemia 05/11/2021    Primary hypertension 05/11/2021    Coronary artery disease involving native coronary artery of native heart without angina pectoris 05/11/2021    S/P angioplasty with stent 05/11/2021    Hx of CABG 05/11/2021    S/P AAA repair 05/11/2021    S/P prostatectomy 05/11/2021    H/O prostate cancer 05/11/2021     No orders of the defined types were placed in this encounter.

## 2025-03-26 ENCOUNTER — OFFICE VISIT (OUTPATIENT)
Dept: CARDIOLOGY CLINIC | Facility: CLINIC | Age: 80
End: 2025-03-26
Payer: COMMERCIAL

## 2025-03-26 VITALS
DIASTOLIC BLOOD PRESSURE: 52 MMHG | HEIGHT: 74 IN | WEIGHT: 231 LBS | HEART RATE: 70 BPM | BODY MASS INDEX: 29.65 KG/M2 | SYSTOLIC BLOOD PRESSURE: 118 MMHG | OXYGEN SATURATION: 97 %

## 2025-03-26 DIAGNOSIS — E83.9 CHRONIC KIDNEY DISEASE-MINERAL AND BONE DISORDER: ICD-10-CM

## 2025-03-26 DIAGNOSIS — I48.91 NEW ONSET ATRIAL FIBRILLATION (HCC): Primary | ICD-10-CM

## 2025-03-26 DIAGNOSIS — M89.9 CHRONIC KIDNEY DISEASE-MINERAL AND BONE DISORDER: ICD-10-CM

## 2025-03-26 DIAGNOSIS — Z95.0 S/P PLACEMENT OF CARDIAC PACEMAKER: ICD-10-CM

## 2025-03-26 DIAGNOSIS — I10 PRIMARY HYPERTENSION: ICD-10-CM

## 2025-03-26 DIAGNOSIS — I50.32 CHRONIC DIASTOLIC CONGESTIVE HEART FAILURE (HCC): ICD-10-CM

## 2025-03-26 DIAGNOSIS — Z95.828 HISTORY OF ENDOVASCULAR STENT GRAFT FOR ABDOMINAL AORTIC ANEURYSM (AAA): ICD-10-CM

## 2025-03-26 DIAGNOSIS — N18.9 CHRONIC KIDNEY DISEASE-MINERAL AND BONE DISORDER: ICD-10-CM

## 2025-03-26 DIAGNOSIS — I49.3 FREQUENT PVCS: ICD-10-CM

## 2025-03-26 DIAGNOSIS — I25.10 CORONARY ARTERY DISEASE INVOLVING NATIVE CORONARY ARTERY OF NATIVE HEART WITHOUT ANGINA PECTORIS: ICD-10-CM

## 2025-03-26 DIAGNOSIS — E78.2 MIXED HYPERLIPIDEMIA: ICD-10-CM

## 2025-03-26 DIAGNOSIS — I20.89 STABLE ANGINA PECTORIS (HCC): ICD-10-CM

## 2025-03-26 PROCEDURE — 93000 ELECTROCARDIOGRAM COMPLETE: CPT

## 2025-03-26 PROCEDURE — 99214 OFFICE O/P EST MOD 30 MIN: CPT

## 2025-03-26 RX ORDER — ENOXAPARIN SODIUM 150 MG/ML
1 INJECTION SUBCUTANEOUS ONCE
OUTPATIENT
Start: 2025-03-26 | End: 2025-03-26

## 2025-03-26 RX ORDER — AMLODIPINE BESYLATE 2.5 MG/1
2.5 TABLET ORAL DAILY
COMMUNITY
Start: 2025-03-17

## 2025-03-26 NOTE — PROGRESS NOTES
[facility]  General Cardiology Note  Thomas Horne  1945  [id number]      Referring Provider - No ref. provider found  Chief Complaint   Patient presents with    Follow-up    Shortness of Breath     Chest pain and sob with little exertion going up/down steps. Has been going on for a while and it's getting worse. Heart pounds and has to rest.       Assessment & Plan  New onset atrial fibrillation (HCC)  [id number] Stroke Risk Points: 6   Values used to calculate this score:    Points  Metrics       1        Has Congestive Heart Failure: Yes       1        Has Hypertension: Yes       2        Age: 80       1        Has Diabetes: Yes       0        Had Stroke: No                 Had TIA: No                 Had Thromboembolism: No       1        Has Vascular Disease: Yes       0        Clinically Relevant Sex: Male   HR 61 bpm  Reports increased OQUENDO for past few months associated with palpitations and chest pain radiating to his left arm  Continue Toprol  mg daily  Start eliquis 5 mg bid  Cardioversion procedure ordered. Risks and benefit reviewed with patient.   S/P placement of cardiac pacemaker  Sick sinus syndrome with episodes of bradycardia and frequent PVCs.   Patient had a pacemaker placed 12/13/24 1/17/25 device check shows normal device function. AP 90%  35%. no significant high rate episodes   Frequent PVCs    Stable angina pectoris (HCC)  Currently he get exertional symptoms only.  Continue Plavix along with Imdur 90 mg daily and Ranexa 1000 mg bid   Atrial fibrillation possibly contributing to OQUENDO. We will continue to monitor  ED precautions reviewed  Coronary artery disease involving native coronary artery of native heart without angina pectoris   Coronary artery disease postcoronary artery bypass surgery with three-vessel bypass patient with history of three-vessel bypass.  His last cath in November 2022 shows only patent LIMA to LAD.  Proximal % occluded,  proximal % occluded.  Vein graft to % occluded.  Obtuse marginal 2. 100% occluded.  Vein graft to OM 2, 100% occluded.  AV groove circumflex is widely patent which feeds posterior lateral branches.  Patient has grade 1 to grade 2 collaterals to right circulation from the left circulation.    EF is 50%.    On plavix, statin, BB  Imdur 90 mg daily and Ranexa 1000 mg bid  Primary hypertension  Patient blood pressure acceptable currently Toprol- mg daily amlodipine 2.5, Jardiance and torsemide 20 mg daily.    Per Dr Fischer, he does not need to be on Losartan  Chronic diastolic congestive heart failure (HCC)  Wt Readings from Last 3 Encounters:   03/26/25 105 kg (231 lb)   03/06/25 99.8 kg (220 lb)   02/20/25 101 kg (223 lb)   - 12/13/24 echo: LVEF 50%, mild global hypokinesis. Diastolic function is abnormal. Left atrial filling pressure is elevated. Mild V dilation. Moderate LAE. Mild OLEG. Moderate to severe AS. Mild MR. RVSP 39 mmHg  - appears compensated  - continue current medication regimen  Mixed hyperlipidemia  Lab Results   Component Value Date/Time    CHOLESTEROL 137 12/17/2024 05:17 AM    TRIG 178 (H) 12/17/2024 05:17 AM    HDL 35 (L) 12/17/2024 05:17 AM    LDLCALC 66 12/17/2024 05:17 AM   On lipitor 40 mg daily  History of endovascular stent graft for abdominal aortic aneurysm (AAA)    Chronic kidney disease-mineral and bone disorder  Lab Results   Component Value Date    EGFR 47 02/15/2025    EGFR 34 01/22/2025    EGFR 51 01/09/2025    CREATININE 1.37 (H) 02/15/2025    CREATININE 1.80 (H) 01/22/2025    CREATININE 1.30 01/09/2025   Follows with nephro     Will RTO in 6 months or sooner if necessary  Patient / Caretaker was advised and educated to call our office  immediately if  patient has any new symptoms of chest pain/shortness of breath, near-syncope, syncope, light headedness sustained palpitations  or any other cardiovascular symptoms before their scheduled follow-up appointment.  ED  precautions reviewed    Interval History: 80 y.o.  male  with PMH as below is here for routine visit  Patient of Dr. Pereira    Today,  Reports increased OQUENDO for past few months associated with palpitations and chest pain radiating to his left arm . States he is compliant with his meds      Patient Active Problem List    Diagnosis Date Noted    Constipation 02/21/2025    Gram-positive bacteremia 01/05/2025    Acute metabolic encephalopathy 01/05/2025    Bacteremia 01/04/2025    S/P placement of cardiac pacemaker 12/20/2024    Bradycardia 12/12/2024    Multiple open wounds of lower extremity 12/12/2024    Ulcer of right foot (HCC) 09/07/2024    SOB (shortness of breath) 09/06/2024    Nausea 09/06/2024    Elevated troponin 09/06/2024    History of DVT (deep vein thrombosis) 08/26/2024    Diabetic ulcer of left foot associated with type 2 diabetes mellitus, with muscle involvement without evidence of necrosis (HCC) 08/08/2024    Plantar fasciitis, right 07/10/2024    Acute deep vein thrombosis (DVT) of left peroneal vein (HCC) 05/01/2024    Iron deficiency anemia 04/23/2024    Shortness of breath 04/09/2024    History of colon polyps 02/22/2024    Duodenal ulcer 02/01/2024    Multiple gastric ulcers 12/27/2023    Iron deficiency anemia due to chronic blood loss 12/27/2023    Melena 12/26/2023    Symptomatic anemia 12/26/2023    Frequent PVCs 06/17/2023    Chronic diastolic congestive heart failure (HCC) 06/16/2023    Acute kidney injury superimposed on stage 3b chronic kidney disease (HCC) 06/16/2023    Diabetic ulcer of right midfoot associated with diabetes mellitus due to underlying condition, limited to breakdown of skin (HCC) 06/15/2023    Hypertensive urgency 05/11/2023    Elevated troponin level not due myocardial infarction 05/11/2023    Stable angina pectoris (HCC) 03/07/2023    Chronic kidney disease-mineral and bone disorder 11/30/2022    Nonrheumatic aortic valve stenosis 11/11/2022    Gross hematuria  2022    Platelets decreased ([facility]) 2022    Acute kidney injury superimposed on chronic kidney disease  ([facility]) 2022    Actinic keratoses 2022    Type 2 diabetes mellitus with diabetic peripheral angiopathy without gangrene, with long-term current use of insulin ([facility]) 2022    Varicose veins of left lower extremity 2021    History of endovascular stent graft for abdominal aortic aneurysm (AAA) 2021    Bruit (arterial) 2021    PAD (peripheral artery disease) (HCC) 2021    Type 2 diabetes mellitus with diabetic polyneuropathy, with long-term current use of insulin ([facility]) 06/10/2021    Mixed hyperlipidemia 2021    Primary hypertension 2021    Coronary artery disease involving native coronary artery of native heart without angina pectoris 2021    S/P angioplasty with stent 2021    Hx of CABG 2021    S/P AAA repair 2021    S/P prostatectomy 2021    H/O prostate cancer 2021     Past Medical History:   Diagnosis Date    Anemia     CAD (coronary artery disease)     Callus     Cancer (HCC)     prostate    CHF (congestive heart failure) ([facility])     Chronic kidney disease     Clotting disorder ([facility])     Coronary artery disease     Deep vein thrombosis ([facility])     Diabetes mellitus ([facility])     Difficulty walking     Duodenal ulcer     Ear problems     Heart disease     Heart murmur     HL (hearing loss)     Hyperlipidemia     Hypertension     Myocardial infarction (HCC)     Neuropathy     Bilateral feet    Neuropathy in diabetes ([facility])     Plantar fasciitis     Sleep apnea     Could not tolerate CPAP     Social History     Tobacco Use    Smoking status: Former     Current packs/day: 0.00     Average packs/day: 1.5 packs/day for 35.0 years (52.5 ttl pk-yrs)     Types: Cigarettes     Start date: 1956     Quit date:      Years since quittin.2     Passive exposure: Past    Smokeless tobacco: Never   Vaping Use    Vaping status:  Never Used   Substance Use Topics    Alcohol use: Yes     Alcohol/week: 14.0 standard drinks of alcohol     Types: 14 Cans of beer per week     Comment: stopped last few months    Drug use: Never      Family History   Problem Relation Age of Onset    Diabetes Mother     Alcohol abuse Father     Heart disease Brother     Cancer Sister         Thyroid    Cancer Sister         Colon    Cancer Brother         Throat    Thyroid disease Brother     Cancer Brother     Colon cancer Brother     Diabetes Sister     Thyroid disease Sister     Colon cancer Sister     Mental illness Neg Hx      Past Surgical History:   Procedure Laterality Date    ABDOMINAL AORTIC ANEURYSM REPAIR      Stented    ADENOIDECTOMY      BACK SURGERY  1985    CARDIAC CATHETERIZATION Left 10/19/2022    Procedure: Cardiac Left Heart Cath;  Surgeon: Danielle Pereira MD;  Location: [facility];  Service: Cardiology    CARDIAC ELECTROPHYSIOLOGY PROCEDURE Left 12/16/2024    Procedure: Cardiac pacer implant;  Surgeon: Danielle Pereira MD;  Location: [facility];  Service: Cardiology    CARDIAC SURGERY  2002    3 cardiac bypass then angioplasty 7/2020    CHOLECYSTECTOMY      COLONOSCOPY      CORONARY ARTERY BYPASS GRAFT      IR LOWER EXTREMITY ANGIOGRAM  11/01/2023    IR LOWER EXTREMITY ANGIOGRAM  08/07/2024    IR LOWER EXTREMITY ANGIOGRAM  01/07/2025    LAMINECTOMY  1990    CO BYPASS W/VEIN FEMORAL-POPLITEAL Right 11/16/2023    Procedure: BYPASS FEMORAL-POPLITEAL WITH CRYO VEIN, RIGHT FEMORAL ENDARTERECTOMY;  Surgeon: Vasquez Clark MD;  Location: [facility];  Service: Vascular    CO BYPASS W/VEIN FEMORAL-POPLITEAL Left 08/30/2024    Procedure: left lower extremity above knee popliteal to below knee popliteal artery bypass with PTFE graft;  Surgeon: Vasquez Clark MD;  Location: [facility];  Service: Vascular    CO SLCTV CATHJ 3RD+ ORD SLCTV ABDL PEL/LXTR BRNCH Right 11/01/2023    Procedure: ARTERIOGRAM Right lower  extremity arteriogram with CO2 via right groin access;  Surgeon: Vasquez Clark MD;  Location: [facility];  Service: Vascular    CA SLCTV CATHJ 3RD+ ORD SLCTV ABDL PEL/LXTR BRNCH Left 08/07/2024    Procedure: diagnostic LLE Arteriogram;  Surgeon: Vasquez Clark MD;  Location: [facility];  Service: Vascular    PROSTATE SURGERY      TONSILLECTOMY      URINARY SPHINCTER IMPLANT         Current Outpatient Medications:     acetaminophen (TYLENOL) 325 mg tablet, Take 2 tablets (650 mg total) by mouth every 6 (six) hours as needed for mild pain, Disp: , Rfl:     amLODIPine (NORVASC) 2.5 mg tablet, Take 2.5 mg by mouth daily, Disp: , Rfl:     apixaban (Eliquis) 5 mg, Take 1 tablet (5 mg total) by mouth 2 (two) times a day, Disp: 180 tablet, Rfl: 2    atorvastatin (LIPITOR) 40 mg tablet, Take 1 tablet (40 mg total) by mouth daily with dinner, Disp: 90 tablet, Rfl: 1    Blood Glucose Monitoring Suppl (OneTouch Verio Reflect) w/Device KIT, Check blood sugars twice daily. Please substitute with appropriate alternative as covered by patient's insurance. Dx: E11.65, Disp: 1 kit, Rfl: 0    clopidogrel (PLAVIX) 75 mg tablet, Take 1 tablet (75 mg total) by mouth daily, Disp: 30 tablet, Rfl: 3    Droplet Pen Needles 32G X 4 MM MISC, USE EVERY EVENING, Disp: 100 each, Rfl: 5    DULoxetine (CYMBALTA) 30 mg delayed release capsule, Take 1 capsule (30 mg total) by mouth daily, Disp: 90 capsule, Rfl: 0    Empagliflozin (Jardiance) 25 MG TABS, TAKE 1 TABLET BY MOUTH DAILY, Disp: 90 tablet, Rfl: 1    famotidine (PEPCID) 40 MG tablet, Take 1 tablet (40 mg total) by mouth daily at bedtime, Disp: 90 tablet, Rfl: 1    fenofibrate 160 MG tablet, TAKE 1 TABLET BY MOUTH DAILY, Disp: 90 tablet, Rfl: 1    ferrous sulfate 324 (65 Fe) mg, Take 1 tablet (324 mg total) by mouth every other day, Disp: , Rfl:     insulin glargine (LANTUS) 100 units/mL subcutaneous injection, Inject 15 Units under the skin daily at bedtime, Disp:  13.5 mL, Rfl: 3    isosorbide mononitrate (IMDUR) 30 mg 24 hr tablet, Take 3 tablets (90 mg total) by mouth daily, Disp: 270 tablet, Rfl: 1    metFORMIN (GLUCOPHAGE) 500 mg tablet, TAKE 1 TABLET BY MOUTH TWICE  DAILY WITH MEALS, Disp: 180 tablet, Rfl: 1    metoprolol succinate (TOPROL-XL) 100 mg 24 hr tablet, TAKE 1 TABLET (100 MG TOTAL) BY MOUTH DAILY, Disp: 90 tablet, Rfl: 1    Multiple Vitamins-Minerals (MULTIVITAMIN MEN 50+ PO), Take by mouth daily, Disp: , Rfl:     Omega-3 Fatty Acids (fish oil) 1,000 mg, Take 4,000 mg by mouth 2 (two) times a day, Disp: , Rfl:     ondansetron (ZOFRAN) 4 mg tablet, Take 1 tablet (4 mg total) by mouth every 12 (twelve) hours as needed for nausea or vomiting, Disp: 30 tablet, Rfl: 0    OneTouch Delica Lancets 33G MISC, Check blood sugars twice daily. Please substitute with appropriate alternative as covered by patient's insurance. Dx: E11.65, Disp: 200 each, Rfl: 3    oxyCODONE-acetaminophen (PERCOCET) 5-325 mg per tablet, Take 1 tablet by mouth 2 (two) times a day as needed for moderate pain or severe pain, Disp: , Rfl:     pantoprazole (PROTONIX) 40 mg tablet, TAKE 1 TABLET BY MOUTH DAILY, Disp: 90 tablet, Rfl: 0    pentoxifylline (TRENtal) 400 mg ER tablet, TAKE 1 TABLET BY MOUTH 3 TIMES  DAILY WITH MEALS, Disp: 270 tablet, Rfl: 3    pregabalin (LYRICA) 150 mg capsule, Take 1 capsule (150 mg total) by mouth 3 (three) times a day, Disp: 270 capsule, Rfl: 0    ranolazine (RANEXA) 1000 MG SR tablet, Take 1 tablet (1,000 mg total) by mouth 2 (two) times a day, Disp: 180 tablet, Rfl: 3    sitaGLIPtin (JANUVIA) 25 mg tablet, Take 1 tablet (25 mg total) by mouth daily, Disp: 30 tablet, Rfl: 0    torsemide (DEMADEX) 20 mg tablet, TAKE 1 TABLET BY MOUTH DAILY, Disp: 90 tablet, Rfl: 1    nitroglycerin (NITROSTAT) 0.4 mg SL tablet, Place 1 tablet (0.4 mg total) under the tongue every 5 (five) minutes as needed for chest pain (Patient not taking: Reported on 1/15/2025), Disp: 30 tablet,  "Rfl: 0    Allergies   Allergen Reactions    Lisinopril Rash and Lip Swelling       Vitals:    03/26/25 1055   BP: 118/52   BP Location: Right arm   Patient Position: Sitting   Cuff Size: Standard   Pulse: 70   SpO2: 97%   Weight: 105 kg (231 lb)   Height: 6' 2\" (1.88 m)        Vitals:    03/26/25 1055   Weight: 105 kg (231 lb)      Height: 6' 2\" (188 cm)   Body mass index is 29.66 kg/m².    Labs:   Lab Results   Component Value Date/Time    CHOLESTEROL 137 12/17/2024 05:17 AM    TRIG 178 (H) 12/17/2024 05:17 AM    HDL 35 (L) 12/17/2024 05:17 AM    LDLCALC 66 12/17/2024 05:17 AM      Lab Results   Component Value Date    SODIUM 140 02/15/2025    K 4.2 02/15/2025     02/15/2025    CREATININE 1.37 (H) 02/15/2025    EGFR 47 02/15/2025    BUN 35 (H) 02/15/2025    CO2 26 02/15/2025    ALT 17 02/15/2025    AST 21 02/15/2025    INR 1.19 01/07/2025    GLUF 132 (H) 02/15/2025    HGBA1C 6.4 (H) 02/15/2025    WBC 8.47 02/15/2025    HGB 13.8 02/15/2025    HCT 43.4 02/15/2025     02/15/2025         Imaging: No results found.    ECG:  Atrial fibrillation with premature ventricular or aberrantly conducted complexes and with ventricular escape complexes.  Left axis deviation.  Left ventricular hypertrophy with QRS widening.  61 bpm   Reviewed by APOLLO Pennington      Review of Systems   Cardiovascular:  Positive for chest pain, dyspnea on exertion and palpitations.   All other systems reviewed and are negative.    Except as noted in HPI, is otherwise reviewed in detail and a 12 point review of systems is negative.    Physical Exam  Vitals reviewed.   Constitutional:       General: He is not in acute distress.     Appearance: Normal appearance. He is not diaphoretic.   HENT:      Head: Normocephalic and atraumatic.   Eyes:      General: No scleral icterus.  Cardiovascular:      Rate and Rhythm: Normal rate. Rhythm irregular.      Pulses: Normal pulses.           Radial pulses are 2+ on the right side and 2+ on the left " side.      Heart sounds: Normal heart sounds, S1 normal and S2 normal.   Pulmonary:      Effort: Pulmonary effort is normal. No tachypnea or respiratory distress.      Breath sounds: Normal breath sounds. No wheezing or rales.   Abdominal:      General: Bowel sounds are normal. There is no distension.      Palpations: Abdomen is soft.   Musculoskeletal:      Right lower leg: No edema.      Left lower leg: No edema.   Skin:     General: Skin is warm and dry.      Capillary Refill: Capillary refill takes less than 2 seconds.      Comments: Follows with wound care for right foot diabetic toe ulcer   Neurological:      Mental Status: He is alert and oriented to person, place, and time.      Gait: Gait abnormal.   Psychiatric:         Mood and Affect: Mood normal.         Behavior: Behavior normal.          **Please Note: This note may have been constructed using a voice recognition system**     Thank you for the opportunity to participate in the care of this patient.   APOLLO Pennington

## 2025-03-26 NOTE — ASSESSMENT & PLAN NOTE
Lab Results   Component Value Date    EGFR 47 02/15/2025    EGFR 34 01/22/2025    EGFR 51 01/09/2025    CREATININE 1.37 (H) 02/15/2025    CREATININE 1.80 (H) 01/22/2025    CREATININE 1.30 01/09/2025   Follows with nephro

## 2025-03-26 NOTE — ASSESSMENT & PLAN NOTE
Patient blood pressure acceptable currently Toprol- mg daily amlodipine 2.5, Jardiance and torsemide 20 mg daily.    Per Dr Fischer, he does not need to be on Losartan

## 2025-03-26 NOTE — ASSESSMENT & PLAN NOTE
Coronary artery disease postcoronary artery bypass surgery with three-vessel bypass patient with history of three-vessel bypass.  His last cath in November 2022 shows only patent LIMA to LAD.  Proximal % occluded, proximal % occluded.  Vein graft to % occluded.  Obtuse marginal 2. 100% occluded.  Vein graft to OM 2, 100% occluded.  AV groove circumflex is widely patent which feeds posterior lateral branches.  Patient has grade 1 to grade 2 collaterals to right circulation from the left circulation.    EF is 50%.    On plavix, statin, BB  Imdur 90 mg daily and Ranexa 1000 mg bid

## 2025-03-26 NOTE — ASSESSMENT & PLAN NOTE
Wt Readings from Last 3 Encounters:   03/26/25 105 kg (231 lb)   03/06/25 99.8 kg (220 lb)   02/20/25 101 kg (223 lb)   - 12/13/24 echo: LVEF 50%, mild global hypokinesis. Diastolic function is abnormal. Left atrial filling pressure is elevated. Mild V dilation. Moderate LAE. Mild OLEG. Moderate to severe AS. Mild MR. RVSP 39 mmHg  - appears compensated  - continue current medication regimen

## 2025-03-26 NOTE — ASSESSMENT & PLAN NOTE
Currently he get exertional symptoms only.  Continue Plavix along with Imdur 90 mg daily and Ranexa 1000 mg bid   Atrial fibrillation possibly contributing to OQUENDO. We will continue to monitor  ED precautions reviewed

## 2025-03-26 NOTE — ASSESSMENT & PLAN NOTE
Sick sinus syndrome with episodes of bradycardia and frequent PVCs.   Patient had a pacemaker placed 12/13/24 1/17/25 device check shows normal device function. AP 90%  35%. no significant high rate episodes

## 2025-03-26 NOTE — ASSESSMENT & PLAN NOTE
Lab Results   Component Value Date/Time    CHOLESTEROL 137 12/17/2024 05:17 AM    TRIG 178 (H) 12/17/2024 05:17 AM    HDL 35 (L) 12/17/2024 05:17 AM    LDLCALC 66 12/17/2024 05:17 AM   On lipitor 40 mg daily

## 2025-03-27 ENCOUNTER — OFFICE VISIT (OUTPATIENT)
Dept: WOUND CARE | Facility: HOSPITAL | Age: 80
End: 2025-03-27
Payer: COMMERCIAL

## 2025-03-27 VITALS
RESPIRATION RATE: 18 BRPM | TEMPERATURE: 96.7 F | SYSTOLIC BLOOD PRESSURE: 104 MMHG | DIASTOLIC BLOOD PRESSURE: 68 MMHG | HEART RATE: 60 BPM

## 2025-03-27 DIAGNOSIS — E11.621 DIABETIC ULCER OF TOE OF RIGHT FOOT ASSOCIATED WITH TYPE 2 DIABETES MELLITUS, WITH NECROSIS OF BONE (HCC): ICD-10-CM

## 2025-03-27 DIAGNOSIS — L97.514 DIABETIC ULCER OF TOE OF RIGHT FOOT ASSOCIATED WITH TYPE 2 DIABETES MELLITUS, WITH NECROSIS OF BONE (HCC): ICD-10-CM

## 2025-03-27 LAB
GLUCOSE SERPL-MCNC: 117 MG/DL (ref 65–140)
GLUCOSE SERPL-MCNC: 197 MG/DL (ref 65–140)

## 2025-03-27 PROCEDURE — G0277 HBOT, FULL BODY CHAMBER, 30M: HCPCS | Performed by: STUDENT IN AN ORGANIZED HEALTH CARE EDUCATION/TRAINING PROGRAM

## 2025-03-27 PROCEDURE — 99183 HYPERBARIC OXYGEN THERAPY: CPT | Performed by: STUDENT IN AN ORGANIZED HEALTH CARE EDUCATION/TRAINING PROGRAM

## 2025-03-27 PROCEDURE — 82948 REAGENT STRIP/BLOOD GLUCOSE: CPT | Performed by: STUDENT IN AN ORGANIZED HEALTH CARE EDUCATION/TRAINING PROGRAM

## 2025-03-27 NOTE — PROGRESS NOTES
HBO Treatment Course Details       Treatment Notes: Treatment tolerated well.  No complications.  I discussed with Thomas the 2 his last cardiology visit.  He does admit upon further questioning that he has had a little bit more exertional dyspnea and palpitations.  Discussed that he continues to make gains in the foot ulcers so we will cautiously dive him.  He is already diving at the lowest therapeutic depth.  Discussed symptoms with him daily.  If subjective or objective symptoms/signs are worsened that day, will pause or cancel dive treatments     Treatment Course Number: 32  Total Treatments Ordered:  40     Diagnosis:   1. Diabetic ulcer of toe of right foot associated with type 2 diabetes mellitus, with necrosis of bone (HCC)  Hyperbaric oxygen thearpy    Wound Procedure Treatment Diabetic Ulcer Right;Posterior Toe D1, great          HBO Treatment Details:  In-Patient Visit: no  Treatment Length:90 Minutes(Minutes)  Chamber #: Hard sided Monoplace Chamber    Pre-Treatment details:  Pre-treatment protocol Treatment Protocol: 2.0 INDY X 90 minutes w/ 100% oxygen, two 5 minute air breaks  Left ear clear?: yes  Right ear clear?: yes  Left ear intact?: yes  Right ear intact?: yes  Left ear color: translucent  Right ear color: translucent  PE Tubes present, Left ear?: yes  PE Tubes present, Right ear?: yes  Left ear irrigated?: no  Right ear irrigated?: no  Left ear TEED scale: Grade 0  Right ear TEED scale: Grade 0   Pretreatment heart and lung assessment: Pretreatment heart and lung auscltation unremarkable. Patient cleared for HBOT     Treatment details:  INDY Rate: 2  Started Compression: 1104  Reached Compression: 1111  Total Compression Time: 7 (Minutes)  Total Holding Time: 100 (Minutes)  Started Decompression: 1251  Reached Surface: 1258  Total Decompression Time: 7 (Minutes)  Total Airbreaks:   (Minutes)  Total Time of Treatment: 114 (Minutes)  Symptoms Noted During Treatment: None (Minutes)    Post treatment  details:  Left ear clear?: yes  Right ear clear?: yes  Left ears intact?: yes  Right ears intact?: yes  Left ear color: translucent  Right ear color: translucent  PE Tubes present, Left ear?: yes  PE Tubes present, Right ear?: yes        Left ear TEED scale: Grade 0  Right ear TEED scale: Grade 0  Post treatment heart and lung assessment: Post treatment heart and lung auscltation unremarkable. Patient cleared for discharge. Tolerated treatment well.             Vital Signs:  [facility] Glucose Reference Range: 100-350 mg/dl   Pre-Treatment Post-Treatment   Time vitals are taken: 1100 Time vitals are taken: 1305   Blood Pressure: 104/68 Blood Pressure: 104/64   Pulse: 60 Pulse: 72   Resp: 18 Resp: 18   Temp: 96.7 °F (35.9 °C) Temp: 97.7 °F (36.5 °C)   Pre-Inspection Glucose readin Post-Inspection Glucose reading: (!) 117       Allergies   Allergen Reactions   • Lisinopril Rash and Lip Swelling     Patient Active Problem List    Diagnosis Date Noted   • Constipation 2025   • Gram-positive bacteremia 2025   • Acute metabolic encephalopathy 2025   • Bacteremia 2025   • S/P placement of cardiac pacemaker 2024   • Bradycardia 2024   • Multiple open wounds of lower extremity 2024   • Ulcer of right foot (HCC) 2024   • SOB (shortness of breath) 2024   • Nausea 2024   • Elevated troponin 2024   • History of DVT (deep vein thrombosis) 2024   • Diabetic ulcer of left foot associated with type 2 diabetes mellitus, with muscle involvement without evidence of necrosis ([facility]) 2024   • Plantar fasciitis, right 07/10/2024   • Acute deep vein thrombosis (DVT) of left peroneal vein ([facility]) 2024   • Iron deficiency anemia 2024   • Shortness of breath 2024   • History of colon polyps 2024   • Duodenal ulcer 2024   • Multiple gastric ulcers 2023   • Iron deficiency anemia due to chronic blood loss 2023   • Melena 2023    • Symptomatic anemia 12/26/2023   • Frequent PVCs 06/17/2023   • Chronic diastolic congestive heart failure ([facility]) 06/16/2023   • Acute kidney injury superimposed on stage 3b chronic kidney disease ([facility]) 06/16/2023   • Diabetic ulcer of right midfoot associated with diabetes mellitus due to underlying condition, limited to breakdown of skin ([facility]) 06/15/2023   • Hypertensive urgency 05/11/2023   • Elevated troponin level not due myocardial infarction 05/11/2023   • Stable angina pectoris ([facility]) 03/07/2023   • Chronic kidney disease-mineral and bone disorder 11/30/2022   • Nonrheumatic aortic valve stenosis 11/11/2022   • Gross hematuria 07/26/2022   • Platelets decreased ([facility]) 07/22/2022   • Acute kidney injury superimposed on chronic kidney disease  ([facility]) 02/16/2022   • Actinic keratoses 01/14/2022   • Type 2 diabetes mellitus with diabetic peripheral angiopathy without gangrene, with long-term current use of insulin ([facility]) 01/11/2022   • Varicose veins of left lower extremity 06/25/2021   • History of endovascular stent graft for abdominal aortic aneurysm (AAA) 06/25/2021   • Bruit (arterial) 06/25/2021   • PAD (peripheral artery disease) ([facility]) 06/25/2021   • Type 2 diabetes mellitus with diabetic polyneuropathy, with long-term current use of insulin ([facility]) 06/10/2021   • Mixed hyperlipidemia 05/11/2021   • Primary hypertension 05/11/2021   • Coronary artery disease involving native coronary artery of native heart without angina pectoris 05/11/2021   • S/P angioplasty with stent 05/11/2021   • Hx of CABG 05/11/2021   • S/P AAA repair 05/11/2021   • S/P prostatectomy 05/11/2021   • H/O prostate cancer 05/11/2021     Orders Placed This Encounter   Procedures   • Wound Procedure Treatment Diabetic Ulcer Right;Posterior Toe D1, great     This order was created via procedure documentation

## 2025-03-27 NOTE — PROGRESS NOTES
HBO Treatment Course Details       Treatment Notes: Patient tolerated procedure well.     Treatment Course Number:    Total Treatments Ordered:        Diagnosis:   1. Diabetic ulcer of toe of right foot associated with type 2 diabetes mellitus, with necrosis of bone (HCC)  Hyperbaric oxygen thearpy          HBO Treatment Details:  In-Patient Visit: no  Treatment Length:90 Minutes(Minutes)  Chamber #: Hard sided Monoplace Chamber    Pre-Treatment details:  Pre-treatment protocol Treatment Protocol: 2.0 INDY X 90 minutes w/ 100% oxygen, two 5 minute air breaks  Left ear clear?: yes  Right ear clear?: yes  Left ear intact?: yes  Right ear intact?: yes  Left ear color: translucent  Right ear color: translucent  PE Tubes present, Left ear?: yes  PE Tubes present, Right ear?: yes  Left ear irrigated?: no  Right ear irrigated?: no  Left ear TEED scale: Grade 0  Right ear TEED scale: Grade 0   Pretreatment heart and lung assessment: Pretreatment heart and lung auscltation unremarkable. Patient cleared for HBOT     Treatment details:  INDY Rate: 2  Started Compression: 1104  Reached Compression: 1111  Total Compression Time: 7 (Minutes)  Total Holding Time: 100 (Minutes)  Started Decompression: 1251  Reached Surface: 1258  Total Decompression Time: 7 (Minutes)  Total Airbreaks:   (Minutes)  Total Time of Treatment: 114 (Minutes)  Symptoms Noted During Treatment: None (Minutes)    Post treatment details:                                                    Vital Signs:  HBO Glucose Reference Range: 100-350 mg/dl   Pre-Treatment Post-Treatment     Time vitals are taken: 1305     Blood Pressure: 104/64     Pulse: 72     Resp: 18     Temp: 97.7 °F (36.5 °C)     Post-Inspection Glucose reading: (!) 117       Allergies   Allergen Reactions    Lisinopril Rash and Lip Swelling     Patient Active Problem List    Diagnosis Date Noted    Constipation 02/21/2025    Gram-positive bacteremia 01/05/2025    Acute metabolic encephalopathy  01/05/2025    Bacteremia 01/04/2025    S/P placement of cardiac pacemaker 12/20/2024    Bradycardia 12/12/2024    Multiple open wounds of lower extremity 12/12/2024    Ulcer of right foot (HCC) 09/07/2024    SOB (shortness of breath) 09/06/2024    Nausea 09/06/2024    Elevated troponin 09/06/2024    History of DVT (deep vein thrombosis) 08/26/2024    Diabetic ulcer of left foot associated with type 2 diabetes mellitus, with muscle involvement without evidence of necrosis ([facility]) 08/08/2024    Plantar fasciitis, right 07/10/2024    Acute deep vein thrombosis (DVT) of left peroneal vein ([facility]) 05/01/2024    Iron deficiency anemia 04/23/2024    Shortness of breath 04/09/2024    History of colon polyps 02/22/2024    Duodenal ulcer 02/01/2024    Multiple gastric ulcers 12/27/2023    Iron deficiency anemia due to chronic blood loss 12/27/2023    Melena 12/26/2023    Symptomatic anemia 12/26/2023    Frequent PVCs 06/17/2023    Chronic diastolic congestive heart failure (HCC) 06/16/2023    Acute kidney injury superimposed on stage 3b chronic kidney disease ([facility]) 06/16/2023    Diabetic ulcer of right midfoot associated with diabetes mellitus due to underlying condition, limited to breakdown of skin ([facility]) 06/15/2023    Hypertensive urgency 05/11/2023    Elevated troponin level not due myocardial infarction 05/11/2023    Stable angina pectoris ([facility]) 03/07/2023    Chronic kidney disease-mineral and bone disorder 11/30/2022    Nonrheumatic aortic valve stenosis 11/11/2022    Gross hematuria 07/26/2022    Platelets decreased ([facility]) 07/22/2022    Acute kidney injury superimposed on chronic kidney disease  ([facility]) 02/16/2022    Actinic keratoses 01/14/2022    Type 2 diabetes mellitus with diabetic peripheral angiopathy without gangrene, with long-term current use of insulin ([facility]) 01/11/2022    Varicose veins of left lower extremity 06/25/2021    History of endovascular stent graft for abdominal aortic aneurysm (AAA) 06/25/2021    Bruit  (arterial) 06/25/2021    PAD (peripheral artery disease) (HCC) 06/25/2021    Type 2 diabetes mellitus with diabetic polyneuropathy, with long-term current use of insulin (HCC) 06/10/2021    Mixed hyperlipidemia 05/11/2021    Primary hypertension 05/11/2021    Coronary artery disease involving native coronary artery of native heart without angina pectoris 05/11/2021    S/P angioplasty with stent 05/11/2021    Hx of CABG 05/11/2021    S/P AAA repair 05/11/2021    S/P prostatectomy 05/11/2021    H/O prostate cancer 05/11/2021     No orders of the defined types were placed in this encounter.

## 2025-03-27 NOTE — PATIENT INSTRUCTIONS
Orders Placed This Encounter   Procedures    Wound Procedure Treatment Diabetic Ulcer Right;Posterior Toe D1, great     This order was created via procedure documentation

## 2025-03-28 ENCOUNTER — OFFICE VISIT (OUTPATIENT)
Dept: WOUND CARE | Facility: HOSPITAL | Age: 80
End: 2025-03-28
Payer: COMMERCIAL

## 2025-03-28 VITALS
TEMPERATURE: 97.8 F | DIASTOLIC BLOOD PRESSURE: 84 MMHG | RESPIRATION RATE: 15 BRPM | HEART RATE: 89 BPM | SYSTOLIC BLOOD PRESSURE: 156 MMHG

## 2025-03-28 DIAGNOSIS — E11.51 TYPE 2 DIABETES MELLITUS WITH DIABETIC PERIPHERAL ANGIOPATHY WITHOUT GANGRENE, WITH LONG-TERM CURRENT USE OF INSULIN (HCC): ICD-10-CM

## 2025-03-28 DIAGNOSIS — L97.514 DIABETIC ULCER OF TOE OF RIGHT FOOT ASSOCIATED WITH TYPE 2 DIABETES MELLITUS, WITH NECROSIS OF BONE (HCC): Primary | ICD-10-CM

## 2025-03-28 DIAGNOSIS — L97.514 DIABETIC ULCER OF TOE OF RIGHT FOOT ASSOCIATED WITH TYPE 2 DIABETES MELLITUS, WITH NECROSIS OF BONE (HCC): ICD-10-CM

## 2025-03-28 DIAGNOSIS — E11.621 DIABETIC ULCER OF TOE OF RIGHT FOOT ASSOCIATED WITH TYPE 2 DIABETES MELLITUS, WITH NECROSIS OF BONE (HCC): Primary | ICD-10-CM

## 2025-03-28 DIAGNOSIS — E11.621 DIABETIC ULCER OF TOE OF RIGHT FOOT ASSOCIATED WITH TYPE 2 DIABETES MELLITUS, WITH NECROSIS OF BONE (HCC): ICD-10-CM

## 2025-03-28 DIAGNOSIS — Z79.4 TYPE 2 DIABETES MELLITUS WITH DIABETIC PERIPHERAL ANGIOPATHY WITHOUT GANGRENE, WITH LONG-TERM CURRENT USE OF INSULIN (HCC): ICD-10-CM

## 2025-03-28 LAB
GLUCOSE SERPL-MCNC: 114 MG/DL (ref 65–140)
GLUCOSE SERPL-MCNC: 158 MG/DL (ref 65–140)

## 2025-03-28 PROCEDURE — 97597 DBRDMT OPN WND 1ST 20 CM/<: CPT | Performed by: STUDENT IN AN ORGANIZED HEALTH CARE EDUCATION/TRAINING PROGRAM

## 2025-03-28 PROCEDURE — 82948 REAGENT STRIP/BLOOD GLUCOSE: CPT | Performed by: STUDENT IN AN ORGANIZED HEALTH CARE EDUCATION/TRAINING PROGRAM

## 2025-03-28 PROCEDURE — 99183 HYPERBARIC OXYGEN THERAPY: CPT | Performed by: STUDENT IN AN ORGANIZED HEALTH CARE EDUCATION/TRAINING PROGRAM

## 2025-03-28 PROCEDURE — G0277 HBOT, FULL BODY CHAMBER, 30M: HCPCS | Performed by: STUDENT IN AN ORGANIZED HEALTH CARE EDUCATION/TRAINING PROGRAM

## 2025-03-28 RX ORDER — LIDOCAINE 40 MG/G
CREAM TOPICAL ONCE
Status: COMPLETED | OUTPATIENT
Start: 2025-03-28 | End: 2025-03-28

## 2025-03-28 RX ADMIN — LIDOCAINE: 40 CREAM TOPICAL at 09:24

## 2025-03-28 NOTE — PROGRESS NOTES
HBO Treatment Course Details       Treatment Notes: ***     Treatment Course Number: 33  Total Treatments Ordered:  40     Diagnosis:   1. Diabetic ulcer of toe of right foot associated with type 2 diabetes mellitus, with necrosis of bone (HCC)  Hyperbaric oxygen thearpy          HBO Treatment Details:  In-Patient Visit: no  Treatment Length:90 Minutes(Minutes)  Chamber #: Hard sided Monoplace Chamber    Pre-Treatment details:  Pre-treatment protocol Treatment Protocol: 2.0 INDY X 90 minutes w/ 100% oxygen, two 5 minute air breaks  Left ear clear?: yes  Right ear clear?: yes  Left ear intact?: yes  Right ear intact?: yes  Left ear color: difficult to appreciate TM due to abundant cerument  Right ear color: difficult to appreciate TM due to abundant cerument  PE Tubes present, Left ear?: yes     Left ear irrigated?: no  Right ear irrigated?: no  Left ear TEED scale: Grade 0  Right ear TEED scale: Grade 0   Pretreatment heart and lung assessment: Pretreatment heart and lung auscltation unremarkable. Patient cleared for HBOT     Treatment details:  INDY Rate: 2  Started Compression: 1055  Reached Compression: 1102  Total Compression Time: 7 (Minutes)  Total Holding Time: 100 (Minutes)  Started Decompression: 1242  Reached Surface: 1249  Total Decompression Time: 7 (Minutes)  Total Airbreaks:   (Minutes)  Total Time of Treatment: 114 (Minutes)  Symptoms Noted During Treatment: None (Minutes)    Post treatment details:  Left ear clear?: yes  Right ear clear?: yes  Left ears intact?: yes  Right ears intact?: yes        PE Tubes present, Left ear?: yes  PE Tubes present, Right ear?: yes        Left ear TEED scale: Grade 0  Right ear TEED scale: Grade 0  Post treatment heart and lung assessment: Post treatment heart and lung auscltation unremarkable. Patient cleared for discharge. Tolerated treatment well.             Vital Signs:  HBO Glucose Reference Range: 100-350 mg/dl   Pre-Treatment Post-Treatment   Time vitals are  taken: 1050 Time vitals are taken: 1250   Blood Pressure: 156/84 Blood Pressure: 112/68   Pulse: 89 Pulse: 74   Resp: 18 Resp: 16   Temp: 97.8 °F (36.6 °C) Temp: 96.8 °F (36 °C)   Pre-Inspection Glucose readin Post-Inspection Glucose reading: (!) 114       Allergies   Allergen Reactions    Lisinopril Rash and Lip Swelling     Patient Active Problem List    Diagnosis Date Noted    Constipation 2025    Gram-positive bacteremia 2025    Acute metabolic encephalopathy 2025    Bacteremia 2025    S/P placement of cardiac pacemaker 2024    Bradycardia 2024    Multiple open wounds of lower extremity 2024    Ulcer of right foot (HCC) 2024    SOB (shortness of breath) 2024    Nausea 2024    Elevated troponin 2024    History of DVT (deep vein thrombosis) 2024    Diabetic ulcer of left foot associated with type 2 diabetes mellitus, with muscle involvement without evidence of necrosis (HCC) 2024    Plantar fasciitis, right 07/10/2024    Acute deep vein thrombosis (DVT) of left peroneal vein (HCC) 2024    Iron deficiency anemia 2024    Shortness of breath 2024    History of colon polyps 2024    Duodenal ulcer 2024    Multiple gastric ulcers 2023    Iron deficiency anemia due to chronic blood loss 2023    Melena 2023    Symptomatic anemia 2023    Frequent PVCs 2023    Chronic diastolic congestive heart failure (HCC) 2023    Acute kidney injury superimposed on stage 3b chronic kidney disease (HCC) 2023    Diabetic ulcer of right midfoot associated with diabetes mellitus due to underlying condition, limited to breakdown of skin (HCC) 06/15/2023    Hypertensive urgency 2023    Elevated troponin level not due myocardial infarction 2023    Stable angina pectoris (HCC) 2023    Chronic kidney disease-mineral and bone disorder 2022    Nonrheumatic aortic valve  stenosis 11/11/2022    Gross hematuria 07/26/2022    Platelets decreased ([facility]) 07/22/2022    Acute kidney injury superimposed on chronic kidney disease  ([facility]) 02/16/2022    Actinic keratoses 01/14/2022    Type 2 diabetes mellitus with diabetic peripheral angiopathy without gangrene, with long-term current use of insulin ([facility]) 01/11/2022    Varicose veins of left lower extremity 06/25/2021    History of endovascular stent graft for abdominal aortic aneurysm (AAA) 06/25/2021    Bruit (arterial) 06/25/2021    PAD (peripheral artery disease) ([facility]) 06/25/2021    Type 2 diabetes mellitus with diabetic polyneuropathy, with long-term current use of insulin ([facility]) 06/10/2021    Mixed hyperlipidemia 05/11/2021    Primary hypertension 05/11/2021    Coronary artery disease involving native coronary artery of native heart without angina pectoris 05/11/2021    S/P angioplasty with stent 05/11/2021    Hx of CABG 05/11/2021    S/P AAA repair 05/11/2021    S/P prostatectomy 05/11/2021    H/O prostate cancer 05/11/2021     No orders of the defined types were placed in this encounter.

## 2025-03-28 NOTE — ASSESSMENT & PLAN NOTE
Lab Results   Component Value Date    HGBA1C 6.4 (H) 02/15/2025       Orders:  •  lidocaine (LMX) 4 % cream  •  Wound cleansing and dressings Diabetic Ulcer Right;Posterior Toe D1, great; Future  •  Wound Procedure Treatment Diabetic Ulcer Right;Posterior Toe D1, great

## 2025-03-28 NOTE — PROGRESS NOTES
Name: Thomas Horne      : 1945      MRN: [id number]  Encounter Provider: David Frye MD  Encounter Date: 3/28/2025   Encounter department: [facility] WOUND CARE  :  Assessment & Plan  Diabetic ulcer of toe of right foot associated with type 2 diabetes mellitus, with necrosis of bone (HCC)  It was a pleasure to see Thomas Horne for wound care follow up today  Selective debridement performed today as above  Wound is improving   Continue plan of care as noted below with wound'dress   A1C results reviewed with the patient today.   No signs or symptoms of infection today. Patient understands that if any signs of infection start (such as increased redness, drainage, pain, fever, chills, diaphoresis), they should call our office or proceed to the ER or Urgent Care.  Patient should continue a high protein diet to facilitate wound healing  Patient is advised to not submerge wound or leave wound open to air.  Follow up in 1 weeks  Given the multi-factorial nature of wound care, additional time was taken to review patient's treatment plan with other specialties and most recent pertinent lab work and imaging.   All plans of care discussed with patient at bedside who verbalized understanding with treatment plan.    Lab Results   Component Value Date    HGBA1C 6.4 (H) 02/15/2025       Orders:  •  lidocaine (LMX) 4 % cream  •  Wound cleansing and dressings Diabetic Ulcer Right;Posterior Toe D1, great; Future  •  Wound Procedure Treatment Diabetic Ulcer Right;Posterior Toe D1, great  •  Debridement Diabetic Ulcer Right;Posterior Toe D1, great    Type 2 diabetes mellitus with diabetic peripheral angiopathy without gangrene, with long-term current use of insulin (HCC)    Lab Results   Component Value Date    HGBA1C 6.4 (H) 02/15/2025       Orders:  •  lidocaine (LMX) 4 % cream  •  Wound cleansing and dressings Diabetic Ulcer Right;Posterior Toe D1, great; Future  •  Wound Procedure Treatment Diabetic  Ulcer Right;Posterior Toe D1, great        History of Present Illness   Chief Complaint   Patient presents with   • Follow Up Wound Care Visit     Right toe   Here for wound follow up.  80-year-old male here today for follow-up care of diabetic ulcer, Sumner 4, great toe right foot.  Currently also being treated with hyperbaric oxygen therapy for the same      Objective   /84   Pulse 89   Temp 97.8 °F (36.6 °C)   Resp 15     Physical Exam  Vitals reviewed.   Constitutional:       Appearance: Normal appearance.   HENT:      Head: Normocephalic and atraumatic.   Eyes:      Extraocular Movements: Extraocular movements intact.   Pulmonary:      Effort: Pulmonary effort is normal.   Musculoskeletal:      Cervical back: Neck supple.   Skin:     Comments: Right great toe wound with callus deposition and exudate.  This is debrided down to healthy wound base which is significantly improved since last exam.  Measuring smaller today and all directions.  No signs of infection.   Neurological:      Mental Status: He is alert.   Psychiatric:         Mood and Affect: Mood normal.       Wound 11/08/24 Diabetic Ulcer Toe D1, great Right;Posterior (Active)   Wound Image   03/28/25 0921   Enter Sumner score: Sumner Grade 4: Partial-foot gangrene 03/28/25 0921   Wound Description Pink;Dry;Other (Comment) 03/28/25 0921   Non-staged Wound Description Full thickness 03/28/25 0921   Wound Length (cm) 0.1 cm 03/28/25 0921   Wound Width (cm) 0.3 cm 03/28/25 0921   Wound Depth (cm) 0 cm 03/28/25 0921   Wound Surface Area (cm^2) 0.03 cm^2 03/28/25 0921   Wound Volume (cm^3) 0 cm^3 03/28/25 0921   Calculated Wound Volume (cm^3) 0 cm^3 03/28/25 0921   Change in Wound Size % 100 03/28/25 0921   Drainage Amount None 03/28/25 0921   Drainage Description Serous 03/20/25 0958   Clara-wound Assessment Callus 03/28/25 0921   Dressing Status Intact 03/28/25 0921       Debridement   Wound 11/08/24 Diabetic Ulcer Toe D1, great Right;Posterior  "    Date/Time: 3/28/2025 9:15 AM  Universal Protocol:  procedure performed by consultantConsent: Verbal consent obtained.  Risks and benefits: risks, benefits and alternatives were discussed  Consent given by: patient  Time out: Immediately prior to procedure a \"time out\" was called to verify the correct patient, procedure, equipment, support staff and site/side marked as required.  Patient identity confirmed: verbally with patient    Debridement Details  Performed by: physician  Debridement type: selective  Pain control: lidocaine 4%    Post-debridement measurements  Length (cm): 0.1  Width (cm): 0.3  Depth (cm): 0.1  Percent debrided: 100%  Surface Area (cm^2): 0.03  Area Debrided (cm^2): 0.03  Volume (cm^3): 0    Devitalized tissue debrided: biofilm and exudate  Instrument(s) utilized: curette  Bleeding: small  Hemostasis obtained with: pressure  Procedural pain (0-10): 1  Post-procedural pain: 0   Response to treatment: procedure was tolerated well       Results from last 6 Months   Lab Units 01/03/25  1513   WOUND CULTURE  1+ Growth of Staphylococcus aureus*  1+ Growth of             --  David Frye MD    \"This note has been constructed using a voice recognition system. Therefore there may be syntax, spelling, and/or grammatical errors. Occasional wrong word or \"sound alike\" substitutions may have occurred due to the inherent limitations of voice recognition software. Read the chart carefully and recognize, using context, where substitutions have occurred. Please call if you have any questions.\"     "

## 2025-03-28 NOTE — PROGRESS NOTES
HBO Treatment Course Details     Treatment Notes: Patient tolerated HBOT well.  He saw MD in wound care center today, therfore no dressing needed to be done post dive.      Treatment Course Number: 33  Total Treatments Ordered:  40     Diagnosis:   1. Diabetic ulcer of toe of right foot associated with type 2 diabetes mellitus, with necrosis of bone (HCC)  Hyperbaric oxygen thearpy          HBO Treatment Details:  In-Patient Visit: no  Treatment Length:90 Minutes(Minutes)  Chamber #: Hard sided Monoplace Chamber    Pre-Treatment details:  Pre-treatment protocol Treatment Protocol: 2.0 INDY X 90 minutes w/ 100% oxygen, two 5 minute air breaks  Left ear clear?: yes  Right ear clear?: yes  Left ear intact?: yes  Right ear intact?: yes  Left ear color: difficult to appreciate TM due to abundant cerument  Right ear color: difficult to appreciate TM due to abundant cerument  PE Tubes present, Left ear?: yes     Left ear irrigated?: no  Right ear irrigated?: no  Left ear TEED scale: Grade 0  Right ear TEED scale: Grade 0   Pretreatment heart and lung assessment: Pretreatment heart and lung auscltation unremarkable. Patient cleared for HBOT     Treatment details:  INDY Rate: 2  Started Compression: 1055  Reached Compression: 1102  Total Compression Time: 7 (Minutes)  Total Holding Time: 100 (Minutes)  Started Decompression: 1242  Reached Surface: 1249  Total Decompression Time: 7 (Minutes)  Total Airbreaks:   (Minutes)  Total Time of Treatment: 114 (Minutes)  Symptoms Noted During Treatment: None (Minutes)    Post treatment details:  Left ear clear?: yes  Right ear clear?: yes  Left ears intact?: yes  Right ears intact?: yes        PE Tubes present, Left ear?: yes  PE Tubes present, Right ear?: yes        Left ear TEED scale: Grade 0  Right ear TEED scale: Grade 0  Post treatment heart and lung assessment: Post treatment heart and lung auscltation unremarkable. Patient cleared for discharge. Tolerated treatment well.              Vital Signs:  [facility] Glucose Reference Range: 100-350 mg/dl   Pre-Treatment Post-Treatment   Time vitals are taken: 1050 Time vitals are taken: 1250   Blood Pressure: 156/84 Blood Pressure: 112/68   Pulse: 89 Pulse: 74   Resp: 18 Resp: 16   Temp: 97.8 °F (36.6 °C) Temp: 96.8 °F (36 °C)   Pre-Inspection Glucose readin Post-Inspection Glucose reading: (!) 114       Allergies   Allergen Reactions    Lisinopril Rash and Lip Swelling     Patient Active Problem List    Diagnosis Date Noted    Diabetic ulcer of toe of right foot associated with type 2 diabetes mellitus, with necrosis of bone (HCC) 2025    Constipation 2025    Gram-positive bacteremia 2025    Acute metabolic encephalopathy 2025    Bacteremia 2025    S/P placement of cardiac pacemaker 2024    Bradycardia 2024    Multiple open wounds of lower extremity 2024    Ulcer of right foot (HCC) 2024    SOB (shortness of breath) 2024    Nausea 2024    Elevated troponin 2024    History of DVT (deep vein thrombosis) 2024    Diabetic ulcer of left foot associated with type 2 diabetes mellitus, with muscle involvement without evidence of necrosis (HCC) 2024    Plantar fasciitis, right 07/10/2024    Acute deep vein thrombosis (DVT) of left peroneal vein (HCC) 2024    Iron deficiency anemia 2024    Shortness of breath 2024    History of colon polyps 2024    Duodenal ulcer 2024    Multiple gastric ulcers 2023    Iron deficiency anemia due to chronic blood loss 2023    Melena 2023    Symptomatic anemia 2023    Frequent PVCs 2023    Chronic diastolic congestive heart failure (HCC) 2023    Acute kidney injury superimposed on stage 3b chronic kidney disease (HCC) 2023    Diabetic ulcer of right midfoot associated with diabetes mellitus due to underlying condition, limited to breakdown of skin (HCC) 06/15/2023     Hypertensive urgency 05/11/2023    Elevated troponin level not due myocardial infarction 05/11/2023    Stable angina pectoris ([facility]) 03/07/2023    Chronic kidney disease-mineral and bone disorder 11/30/2022    Nonrheumatic aortic valve stenosis 11/11/2022    Gross hematuria 07/26/2022    Platelets decreased ([facility]) 07/22/2022    Acute kidney injury superimposed on chronic kidney disease  ([facility]) 02/16/2022    Actinic keratoses 01/14/2022    Type 2 diabetes mellitus with diabetic peripheral angiopathy without gangrene, with long-term current use of insulin ([facility]) 01/11/2022    Varicose veins of left lower extremity 06/25/2021    History of endovascular stent graft for abdominal aortic aneurysm (AAA) 06/25/2021    Bruit (arterial) 06/25/2021    PAD (peripheral artery disease) ([facility]) 06/25/2021    Type 2 diabetes mellitus with diabetic polyneuropathy, with long-term current use of insulin ([facility]) 06/10/2021    Mixed hyperlipidemia 05/11/2021    Primary hypertension 05/11/2021    Coronary artery disease involving native coronary artery of native heart without angina pectoris 05/11/2021    S/P angioplasty with stent 05/11/2021    Hx of CABG 05/11/2021    S/P AAA repair 05/11/2021    S/P prostatectomy 05/11/2021    H/O prostate cancer 05/11/2021     No orders of the defined types were placed in this encounter.

## 2025-03-28 NOTE — PATIENT INSTRUCTIONS
Orders Placed This Encounter   Procedures    Wound cleansing and dressings Diabetic Ulcer Right;Posterior Toe D1, great     Right Great Toe Wound:      Wash your hands with soap and water. Remove old dressing, discard into plastic bag and place in trash. Cleanse the wound with normal saline prior to applying a clean dressing. Do not use tissue or cotton balls. Do not scrub the wound. Pat dry using gauze.      Shower no, sponge bath only.  Apply Woun'dres to wound bed.   Cover with gauze   Secure with rolled gauze.  Change dressing daily Monday through Friday.     Edema control: Elevate extremity to heart level when sitting. Do not leave dependent.      Offloading: Limit walking to only necessity. Wear surgical shoe to right foot with any walking Wound infection: If you have signs of infection please call the wound center. If the wound center is closedplease go to the Emergency department. Some signs of infection: fever, chills, increased redness, red streaks, increase in pain, increased drainage. Drainage with an odor, Change in drainage color: white/milky/green/tan/yellow, an increase in swelling, chest pain and/or shortness of breath.      Protein: Eat protein with each meal to promote healing. Examples of protein are fish, meat, chicken, nuts, peanut butter, eggs, lentils, edamame or a protein shake.     Standing Status:   Future     Expiration Date:   4/4/2025

## 2025-03-29 DIAGNOSIS — E11.42 TYPE 2 DIABETES MELLITUS WITH DIABETIC POLYNEUROPATHY, WITHOUT LONG-TERM CURRENT USE OF INSULIN (HCC): ICD-10-CM

## 2025-03-31 ENCOUNTER — OFFICE VISIT (OUTPATIENT)
Dept: WOUND CARE | Facility: HOSPITAL | Age: 80
End: 2025-03-31
Payer: COMMERCIAL

## 2025-03-31 DIAGNOSIS — E11.621 DIABETIC ULCER OF TOE OF RIGHT FOOT ASSOCIATED WITH TYPE 2 DIABETES MELLITUS, WITH NECROSIS OF BONE (HCC): ICD-10-CM

## 2025-03-31 DIAGNOSIS — L97.514 DIABETIC ULCER OF TOE OF RIGHT FOOT ASSOCIATED WITH TYPE 2 DIABETES MELLITUS, WITH NECROSIS OF BONE (HCC): ICD-10-CM

## 2025-03-31 LAB
GLUCOSE SERPL-MCNC: 109 MG/DL (ref 65–140)
GLUCOSE SERPL-MCNC: 140 MG/DL (ref 65–140)

## 2025-03-31 PROCEDURE — 82948 REAGENT STRIP/BLOOD GLUCOSE: CPT | Performed by: STUDENT IN AN ORGANIZED HEALTH CARE EDUCATION/TRAINING PROGRAM

## 2025-03-31 PROCEDURE — G0277 HBOT, FULL BODY CHAMBER, 30M: HCPCS | Performed by: STUDENT IN AN ORGANIZED HEALTH CARE EDUCATION/TRAINING PROGRAM

## 2025-03-31 PROCEDURE — 99183 HYPERBARIC OXYGEN THERAPY: CPT | Performed by: STUDENT IN AN ORGANIZED HEALTH CARE EDUCATION/TRAINING PROGRAM

## 2025-03-31 RX ORDER — BLOOD SUGAR DIAGNOSTIC
STRIP MISCELLANEOUS
Qty: 600 STRIP | Refills: 5 | Status: SHIPPED | OUTPATIENT
Start: 2025-03-31

## 2025-03-31 NOTE — PROGRESS NOTES
HBO Treatment Course Details       Treatment Notes: Patient tolerated HBOT well.  Wound care to right great toe: cleanse with NSS, apply Woun'Dres then gauze, rolled gauze and tape.  Patient tolerated well.      Treatment Course Number: 34  Total Treatments Ordered:  40     Diagnosis:   1. Diabetic ulcer of toe of right foot associated with type 2 diabetes mellitus, with necrosis of bone (HCC)  [facility]          HBO Treatment Details:  In-Patient Visit: no  Treatment Length:90 Minutes(Minutes)  Chamber #: Hard sided Monoplace Chamber    Pre-Treatment details:  Pre-treatment protocol Treatment Protocol: 2.0 INDY X 90 minutes w/ 100% oxygen, two 5 minute air breaks  Left ear clear?: yes  Right ear clear?: yes  Left ear intact?: yes  Right ear intact?: yes  Left ear color: translucent  Right ear color: translucent  PE Tubes present, Left ear?: yes  PE Tubes present, Right ear?: yes  Left ear irrigated?: no  Right ear irrigated?: no  Left ear TEED scale: Grade 0  Right ear TEED scale: Grade 0   Pretreatment heart and lung assessment: Pretreatment heart and lung auscltation unremarkable. Patient cleared for HBOT     Treatment details:  INDY Rate: 2  Started Compression: 1055  Reached Compression: 1104  Total Compression Time: 9 (Minutes)  Total Holding Time: 100 (Minutes)  Started Decompression: 1244  Reached Surface: 1252  Total Decompression Time: 8 (Minutes)  Total Airbreaks:   (Minutes)  Total Time of Treatment: 117 (Minutes)  Symptoms Noted During Treatment: None (Minutes)    Post treatment details:  Left ear clear?: yes  Right ear clear?: yes  Left ears intact?: yes  Right ears intact?: yes  Left ear color: translucent  Right ear color: translucent  PE Tubes present, Left ear?: yes  PE Tubes present, Right ear?: yes        Left ear TEED scale: Grade 0  Right ear TEED scale: Grade 0  Post treatment heart and lung assessment: Post treatment heart and lung auscltation unremarkable. Patient cleared for  discharge. Tolerated treatment well.  HBO Supervision: Tolerated dive well          Vital Signs:  HBO Glucose Reference Range: 100-350 mg/dl   Pre-Treatment Post-Treatment   Time vitals are taken: 1050 Time vitals are taken: 1253   Blood Pressure: 100/62 Blood Pressure: 120/78   Pulse: 96 Pulse: 88     Resp: 16   Temp: 96.8 °F (36 °C) Temp: 96.9 °F (36.1 °C)   Pre-Inspection Glucose readin Post-Inspection Glucose reading: (!) 109       Allergies   Allergen Reactions   • Lisinopril Rash and Lip Swelling     Patient Active Problem List    Diagnosis Date Noted   • Diabetic ulcer of toe of right foot associated with type 2 diabetes mellitus, with necrosis of bone ([facility]) 2025   • Constipation 2025   • Gram-positive bacteremia 2025   • Acute metabolic encephalopathy 2025   • Bacteremia 2025   • S/P placement of cardiac pacemaker 2024   • Bradycardia 2024   • Multiple open wounds of lower extremity 2024   • Ulcer of right foot ([facility]) 2024   • SOB (shortness of breath) 2024   • Nausea 2024   • Elevated troponin 2024   • History of DVT (deep vein thrombosis) 2024   • Diabetic ulcer of left foot associated with type 2 diabetes mellitus, with muscle involvement without evidence of necrosis ([facility]) 2024   • Plantar fasciitis, right 07/10/2024   • Acute deep vein thrombosis (DVT) of left peroneal vein ([facility]) 2024   • Iron deficiency anemia 2024   • Shortness of breath 2024   • History of colon polyps 2024   • Duodenal ulcer 2024   • Multiple gastric ulcers 2023   • Iron deficiency anemia due to chronic blood loss 2023   • Melena 2023   • Symptomatic anemia 2023   • Frequent PVCs 2023   • Chronic diastolic congestive heart failure (HCC) 2023   • Acute kidney injury superimposed on stage 3b chronic kidney disease (HCC) 2023   • Diabetic ulcer of right midfoot associated with  diabetes mellitus due to underlying condition, limited to breakdown of skin ([facility]) 06/15/2023   • Hypertensive urgency 05/11/2023   • Elevated troponin level not due myocardial infarction 05/11/2023   • Stable angina pectoris ([facility]) 03/07/2023   • Chronic kidney disease-mineral and bone disorder 11/30/2022   • Nonrheumatic aortic valve stenosis 11/11/2022   • Gross hematuria 07/26/2022   • Platelets decreased ([facility]) 07/22/2022   • Acute kidney injury superimposed on chronic kidney disease  ([facility]) 02/16/2022   • Actinic keratoses 01/14/2022   • Type 2 diabetes mellitus with diabetic peripheral angiopathy without gangrene, with long-term current use of insulin ([facility]) 01/11/2022   • Varicose veins of left lower extremity 06/25/2021   • History of endovascular stent graft for abdominal aortic aneurysm (AAA) 06/25/2021   • Bruit (arterial) 06/25/2021   • PAD (peripheral artery disease) ([facility]) 06/25/2021   • Type 2 diabetes mellitus with diabetic polyneuropathy, with long-term current use of insulin ([facility]) 06/10/2021   • Mixed hyperlipidemia 05/11/2021   • Primary hypertension 05/11/2021   • Coronary artery disease involving native coronary artery of native heart without angina pectoris 05/11/2021   • S/P angioplasty with stent 05/11/2021   • Hx of CABG 05/11/2021   • S/P AAA repair 05/11/2021   • S/P prostatectomy 05/11/2021   • H/O prostate cancer 05/11/2021     No orders of the defined types were placed in this encounter.

## 2025-04-01 ENCOUNTER — OFFICE VISIT (OUTPATIENT)
Dept: WOUND CARE | Facility: HOSPITAL | Age: 80
End: 2025-04-01
Payer: COMMERCIAL

## 2025-04-01 VITALS
RESPIRATION RATE: 16 BRPM | DIASTOLIC BLOOD PRESSURE: 70 MMHG | TEMPERATURE: 96.8 F | HEART RATE: 68 BPM | SYSTOLIC BLOOD PRESSURE: 122 MMHG

## 2025-04-01 DIAGNOSIS — L97.514 DIABETIC ULCER OF TOE OF RIGHT FOOT ASSOCIATED WITH TYPE 2 DIABETES MELLITUS, WITH NECROSIS OF BONE (HCC): ICD-10-CM

## 2025-04-01 DIAGNOSIS — E11.621 DIABETIC ULCER OF TOE OF RIGHT FOOT ASSOCIATED WITH TYPE 2 DIABETES MELLITUS, WITH NECROSIS OF BONE (HCC): ICD-10-CM

## 2025-04-01 LAB
GLUCOSE SERPL-MCNC: 200 MG/DL (ref 65–140)
GLUCOSE SERPL-MCNC: 221 MG/DL (ref 65–140)

## 2025-04-01 PROCEDURE — 99183 HYPERBARIC OXYGEN THERAPY: CPT | Performed by: STUDENT IN AN ORGANIZED HEALTH CARE EDUCATION/TRAINING PROGRAM

## 2025-04-01 PROCEDURE — 82948 REAGENT STRIP/BLOOD GLUCOSE: CPT | Performed by: STUDENT IN AN ORGANIZED HEALTH CARE EDUCATION/TRAINING PROGRAM

## 2025-04-01 PROCEDURE — G0277 HBOT, FULL BODY CHAMBER, 30M: HCPCS | Performed by: STUDENT IN AN ORGANIZED HEALTH CARE EDUCATION/TRAINING PROGRAM

## 2025-04-01 NOTE — PROGRESS NOTES
HBO Treatment Course Details       Treatment Notes: Patient tolerated HBO treatment well. No complaints of pain or discomfort.      Treatment Course Number: 35  Total Treatments Ordered:  40     Diagnosis:   1. Diabetic ulcer of toe of right foot associated with type 2 diabetes mellitus, with necrosis of bone (HCC)  Hyperbaric oxygen thearpy          HBO Treatment Details:  In-Patient Visit: no  Treatment Length:90 Minutes(Minutes)  Chamber #: Hard sided Monoplace Chamber    Pre-Treatment details:  Pre-treatment protocol Treatment Protocol: 2.0 INDY X 90 minutes w/ 100% oxygen, two 5 minute air breaks  Left ear clear?: yes  Right ear clear?: yes  Left ear intact?: yes  Right ear intact?: yes  Left ear color: cerumen obscuring exam  Right ear color: cerumen obscuring exam  PE Tubes present, Left ear?: yes  PE Tubes present, Right ear?: yes  Left ear irrigated?: no  Right ear irrigated?: no  Left ear TEED scale: Grade 0  Right ear TEED scale: Grade 0   Pretreatment heart and lung assessment: Pretreatment heart and lung auscltation unremarkable. Patient cleared for HBOT     Treatment details:  INDY Rate: 2  Started Compression: 1100  Reached Compression: 1107  Total Compression Time: 7 (Minutes)  Total Holding Time: 100 (Minutes)  Started Decompression: 1247  Reached Surface: 1254  Total Decompression Time: 7 (Minutes)  Total Airbreaks:   (Minutes)  Total Time of Treatment: 114 (Minutes)  Symptoms Noted During Treatment: None (Minutes)    Post treatment details:  Left ear clear?: yes     Left ears intact?: yes  Right ears intact?: yes  Left ear color: translucent  Right ear color: translucent  PE Tubes present, Left ear?: yes  PE Tubes present, Right ear?: yes        Left ear TEED scale: Grade 0  Right ear TEED scale: Grade 0  Post treatment heart and lung assessment: Post treatment heart and lung auscltation unremarkable. Patient cleared for discharge. Tolerated treatment well.  HBO Supervision: Tolerated treatment well           Vital Signs:  [facility] Glucose Reference Range: 100-350 mg/dl   Pre-Treatment Post-Treatment   Time vitals are taken: 1050 Time vitals are taken: 1310   Blood Pressure: 140/88 Blood Pressure: 142/90   Pulse: 68 Pulse: 62   Resp: 16 Resp: 16   Temp: 96.8 °F (36 °C) Temp: 97.6 °F (36.4 °C)   Pre-Inspection Glucose readin Post-Inspection Glucose readin       Allergies   Allergen Reactions   • Lisinopril Rash and Lip Swelling     Patient Active Problem List    Diagnosis Date Noted   • Diabetic ulcer of toe of right foot associated with type 2 diabetes mellitus, with necrosis of bone ([facility]) 2025   • Constipation 2025   • Gram-positive bacteremia 2025   • Acute metabolic encephalopathy 2025   • Bacteremia 2025   • S/P placement of cardiac pacemaker 2024   • Bradycardia 2024   • Multiple open wounds of lower extremity 2024   • Ulcer of right foot (HCC) 2024   • SOB (shortness of breath) 2024   • Nausea 2024   • Elevated troponin 2024   • History of DVT (deep vein thrombosis) 2024   • Diabetic ulcer of left foot associated with type 2 diabetes mellitus, with muscle involvement without evidence of necrosis ([facility]) 2024   • Plantar fasciitis, right 07/10/2024   • Acute deep vein thrombosis (DVT) of left peroneal vein ([facility]) 2024   • Iron deficiency anemia 2024   • Shortness of breath 2024   • History of colon polyps 2024   • Duodenal ulcer 2024   • Multiple gastric ulcers 2023   • Iron deficiency anemia due to chronic blood loss 2023   • Melena 2023   • Symptomatic anemia 2023   • Frequent PVCs 2023   • Chronic diastolic congestive heart failure (HCC) 2023   • Acute kidney injury superimposed on stage 3b chronic kidney disease (HCC) 2023   • Diabetic ulcer of right midfoot associated with diabetes mellitus due to underlying condition, limited to breakdown of  skin ([facility]) 06/15/2023   • Hypertensive urgency 05/11/2023   • Elevated troponin level not due myocardial infarction 05/11/2023   • Stable angina pectoris ([facility]) 03/07/2023   • Chronic kidney disease-mineral and bone disorder 11/30/2022   • Nonrheumatic aortic valve stenosis 11/11/2022   • Gross hematuria 07/26/2022   • Platelets decreased ([facility]) 07/22/2022   • Acute kidney injury superimposed on chronic kidney disease  ([facility]) 02/16/2022   • Actinic keratoses 01/14/2022   • Type 2 diabetes mellitus with diabetic peripheral angiopathy without gangrene, with long-term current use of insulin ([facility]) 01/11/2022   • Varicose veins of left lower extremity 06/25/2021   • History of endovascular stent graft for abdominal aortic aneurysm (AAA) 06/25/2021   • Bruit (arterial) 06/25/2021   • PAD (peripheral artery disease) ([facility]) 06/25/2021   • Type 2 diabetes mellitus with diabetic polyneuropathy, with long-term current use of insulin ([facility]) 06/10/2021   • Mixed hyperlipidemia 05/11/2021   • Primary hypertension 05/11/2021   • Coronary artery disease involving native coronary artery of native heart without angina pectoris 05/11/2021   • S/P angioplasty with stent 05/11/2021   • Hx of CABG 05/11/2021   • S/P AAA repair 05/11/2021   • S/P prostatectomy 05/11/2021   • H/O prostate cancer 05/11/2021     No orders of the defined types were placed in this encounter.

## 2025-04-02 ENCOUNTER — OFFICE VISIT (OUTPATIENT)
Dept: WOUND CARE | Facility: HOSPITAL | Age: 80
End: 2025-04-02
Payer: COMMERCIAL

## 2025-04-02 VITALS
DIASTOLIC BLOOD PRESSURE: 68 MMHG | TEMPERATURE: 97.2 F | RESPIRATION RATE: 15 BRPM | HEART RATE: 78 BPM | SYSTOLIC BLOOD PRESSURE: 123 MMHG

## 2025-04-02 DIAGNOSIS — E11.621 DIABETIC ULCER OF TOE OF RIGHT FOOT ASSOCIATED WITH TYPE 2 DIABETES MELLITUS, WITH FAT LAYER EXPOSED (HCC): Primary | ICD-10-CM

## 2025-04-02 DIAGNOSIS — L97.512 DIABETIC ULCER OF TOE OF RIGHT FOOT ASSOCIATED WITH TYPE 2 DIABETES MELLITUS, WITH FAT LAYER EXPOSED (HCC): Primary | ICD-10-CM

## 2025-04-02 DIAGNOSIS — E11.621 DIABETIC ULCER OF TOE OF RIGHT FOOT ASSOCIATED WITH TYPE 2 DIABETES MELLITUS, WITH NECROSIS OF BONE (HCC): Primary | ICD-10-CM

## 2025-04-02 DIAGNOSIS — Z79.4 TYPE 2 DIABETES MELLITUS WITH DIABETIC PERIPHERAL ANGIOPATHY WITHOUT GANGRENE, WITH LONG-TERM CURRENT USE OF INSULIN (HCC): ICD-10-CM

## 2025-04-02 DIAGNOSIS — E11.51 TYPE 2 DIABETES MELLITUS WITH DIABETIC PERIPHERAL ANGIOPATHY WITHOUT GANGRENE, WITH LONG-TERM CURRENT USE OF INSULIN (HCC): ICD-10-CM

## 2025-04-02 DIAGNOSIS — L97.514 DIABETIC ULCER OF TOE OF RIGHT FOOT ASSOCIATED WITH TYPE 2 DIABETES MELLITUS, WITH NECROSIS OF BONE (HCC): Primary | ICD-10-CM

## 2025-04-02 PROCEDURE — 99212 OFFICE O/P EST SF 10 MIN: CPT | Performed by: STUDENT IN AN ORGANIZED HEALTH CARE EDUCATION/TRAINING PROGRAM

## 2025-04-02 RX ORDER — LIDOCAINE 40 MG/G
CREAM TOPICAL ONCE
Status: COMPLETED | OUTPATIENT
Start: 2025-04-02 | End: 2025-04-02

## 2025-04-02 RX ADMIN — LIDOCAINE: 40 CREAM TOPICAL at 10:08

## 2025-04-02 NOTE — ASSESSMENT & PLAN NOTE
It was a pleasure to see Thomas Horne for wound care follow up today  Wound is fully epithelialized today.  Patient advised to protect newly healed skin and to monitor feet for reopening of prior wounds or new wounds.  Follow-up with podiatry for routine footcare.  No longer any need for hyperbaric therapy.  Discharge from wound management today with follow-up as needed in the future.  All plans of care discussed with patient at bedside who verbalized understanding with treatment plan.      Lab Results   Component Value Date    HGBA1C 6.4 (H) 02/15/2025       Orders:  •  lidocaine (LMX) 4 % cream  •  Wound cleansing and dressings Diabetic Ulcer Right;Posterior Toe D1, great; Future

## 2025-04-02 NOTE — PROGRESS NOTES
Name: Thomas Horne      : 1945      MRN: [id number]  Encounter Provider: David Frye MD  Encounter Date: 2025   Encounter department: [facility] WOUND CARE  :  Assessment & Plan  Diabetic ulcer of toe of right foot associated with type 2 diabetes mellitus, with necrosis of bone (HCC)  It was a pleasure to see Thomas Horne for wound care follow up today  Wound is fully epithelialized today.  Patient advised to protect newly healed skin and to monitor feet for reopening of prior wounds or new wounds.  Follow-up with podiatry for routine footcare.  No longer any need for hyperbaric therapy.  Discharge from wound management today with follow-up as needed in the future.  All plans of care discussed with patient at bedside who verbalized understanding with treatment plan.      Lab Results   Component Value Date    HGBA1C 6.4 (H) 02/15/2025       Orders:  •  lidocaine (LMX) 4 % cream  •  Wound cleansing and dressings Diabetic Ulcer Right;Posterior Toe D1, great; Future    Type 2 diabetes mellitus with diabetic peripheral angiopathy without gangrene, with long-term current use of insulin (HCC)    Lab Results   Component Value Date    HGBA1C 6.4 (H) 02/15/2025       Orders:  •  lidocaine (LMX) 4 % cream  •  Wound cleansing and dressings Diabetic Ulcer Right;Posterior Toe D1, great; Future        History of Present Illness   Chief Complaint   Patient presents with   • Follow Up Wound Care Visit     Right foot   Here for wound follow up.  80-year-old male here today for follow-up care of right great toe DFU      Objective   /68   Pulse 78   Temp (!) 97.2 °F (36.2 °C)   Resp 15     Physical Exam  Vitals reviewed.   Constitutional:       Appearance: Normal appearance.   HENT:      Head: Normocephalic and atraumatic.   Eyes:      Extraocular Movements: Extraocular movements intact.   Pulmonary:      Effort: Pulmonary effort is normal.   Musculoskeletal:      Cervical back: Neck supple.  "  Skin:     Comments: Distal right great toe wound is fully epithelialized today   Neurological:      Mental Status: He is alert.   Psychiatric:         Mood and Affect: Mood normal.              Results from last 6 Months   Lab Units 01/03/25  1513   WOUND CULTURE  1+ Growth of Staphylococcus aureus*  1+ Growth of             --  David Frye MD    \"This note has been constructed using a voice recognition system. Therefore there may be syntax, spelling, and/or grammatical errors. Occasional wrong word or \"sound alike\" substitutions may have occurred due to the inherent limitations of voice recognition software. Read the chart carefully and recognize, using context, where substitutions have occurred. Please call if you have any questions.\"     "

## 2025-04-04 ENCOUNTER — TELEPHONE (OUTPATIENT)
Age: 80
End: 2025-04-04

## 2025-04-04 NOTE — TELEPHONE ENCOUNTER
Pt called in stating that he has questions regarding what medication he can take prior is cath on 4/9.    Please advise

## 2025-04-07 DIAGNOSIS — I73.9 PERIPHERAL ARTERY DISEASE (HCC): ICD-10-CM

## 2025-04-07 DIAGNOSIS — L97.521 DIABETIC ULCER OF TOE OF LEFT FOOT ASSOCIATED WITH TYPE 2 DIABETES MELLITUS, LIMITED TO BREAKDOWN OF SKIN (HCC): ICD-10-CM

## 2025-04-07 DIAGNOSIS — E11.621 DIABETIC ULCER OF TOE OF LEFT FOOT ASSOCIATED WITH TYPE 2 DIABETES MELLITUS, LIMITED TO BREAKDOWN OF SKIN (HCC): ICD-10-CM

## 2025-04-08 RX ORDER — CLOPIDOGREL BISULFATE 75 MG/1
75 TABLET ORAL DAILY
Qty: 30 TABLET | Refills: 5 | Status: SHIPPED | OUTPATIENT
Start: 2025-04-08

## 2025-04-09 ENCOUNTER — ANESTHESIA (OUTPATIENT)
Dept: NON INVASIVE DIAGNOSTICS | Facility: HOSPITAL | Age: 80
End: 2025-04-09
Payer: COMMERCIAL

## 2025-04-09 ENCOUNTER — HOSPITAL ENCOUNTER (OUTPATIENT)
Dept: NON INVASIVE DIAGNOSTICS | Facility: HOSPITAL | Age: 80
Discharge: HOME | End: 2025-04-09
Payer: COMMERCIAL

## 2025-04-09 ENCOUNTER — ANESTHESIA EVENT (OUTPATIENT)
Dept: NON INVASIVE DIAGNOSTICS | Facility: HOSPITAL | Age: 80
End: 2025-04-09
Payer: COMMERCIAL

## 2025-04-09 ENCOUNTER — DOCUMENTATION (OUTPATIENT)
Dept: ADMINISTRATIVE | Facility: OTHER | Age: 80
End: 2025-04-09

## 2025-04-09 VITALS — OXYGEN SATURATION: 98 % | HEART RATE: 76 BPM | RESPIRATION RATE: 18 BRPM

## 2025-04-09 DIAGNOSIS — I50.32 CHRONIC DIASTOLIC CONGESTIVE HEART FAILURE (HCC): ICD-10-CM

## 2025-04-09 DIAGNOSIS — E11.42 TYPE 2 DIABETES MELLITUS WITH DIABETIC POLYNEUROPATHY, WITHOUT LONG-TERM CURRENT USE OF INSULIN (HCC): ICD-10-CM

## 2025-04-09 DIAGNOSIS — Z95.0 S/P PLACEMENT OF CARDIAC PACEMAKER: ICD-10-CM

## 2025-04-09 DIAGNOSIS — I48.91 NEW ONSET ATRIAL FIBRILLATION (HCC): ICD-10-CM

## 2025-04-09 PROCEDURE — 93005 ELECTROCARDIOGRAM TRACING: CPT

## 2025-04-09 RX ORDER — SODIUM CHLORIDE 9 MG/ML
INJECTION, SOLUTION INTRAVENOUS CONTINUOUS PRN
Status: DISCONTINUED | OUTPATIENT
Start: 2025-04-09 | End: 2025-04-09

## 2025-04-09 RX ORDER — ENOXAPARIN SODIUM 150 MG/ML
1 INJECTION SUBCUTANEOUS ONCE
Status: DISCONTINUED | OUTPATIENT
Start: 2025-04-09 | End: 2025-04-09

## 2025-04-09 NOTE — SEDATION DOCUMENTATION
Pt arrived to cath lab for cardioversion in no apparent distress. Pt's rate regular on monitor. 12 lead EKG performed. Regular paced rhythm on EKG. [employer] rep Garcia at bedside for interrogation. Per [employer] rep, pt has no evidence of afib. MD Castellanos made aware. MD Castellanos at bedside. Pt and daughter educated and discharged to home.

## 2025-04-09 NOTE — DISCHARGE INSTR - LAB
STOP ELIQUIS PER DR MENDOSA  ORDER TO BE PUT IN FOR CARDIAC REHAB PLEASE FOLLOW UP WITH THE OFFICE

## 2025-04-09 NOTE — PROGRESS NOTES
04/09/25 10:58 AM    Annual Wellness Visit outreach is not required, an AWV was completed at the PCP office.    Thank you.  Ileana Ferreira  [facility]

## 2025-04-10 LAB
ATRIAL RATE: 74 BPM
PR INTERVAL: 212 MS
QRS AXIS: -16 DEGREES
QRSD INTERVAL: 162 MS
QT INTERVAL: 472 MS
QTC INTERVAL: 531 MS
T WAVE AXIS: 199 DEGREES
VENTRICULAR RATE: 76 BPM

## 2025-04-10 PROCEDURE — 93010 ELECTROCARDIOGRAM REPORT: CPT | Performed by: INTERNAL MEDICINE

## 2025-04-10 RX ORDER — PEN NEEDLE, DIABETIC 32GX 5/32"
1 NEEDLE, DISPOSABLE MISCELLANEOUS DAILY
Qty: 100 EACH | Refills: 5 | Status: SHIPPED | OUTPATIENT
Start: 2025-04-10

## 2025-04-14 ENCOUNTER — APPOINTMENT (EMERGENCY)
Dept: RADIOLOGY | Facility: HOSPITAL | Age: 80
End: 2025-04-14
Payer: COMMERCIAL

## 2025-04-14 ENCOUNTER — TELEPHONE (OUTPATIENT)
Dept: CARDIOLOGY CLINIC | Facility: CLINIC | Age: 80
End: 2025-04-14

## 2025-04-14 ENCOUNTER — HOSPITAL ENCOUNTER (EMERGENCY)
Facility: HOSPITAL | Age: 80
Discharge: HOME | End: 2025-04-14
Attending: EMERGENCY MEDICINE | Admitting: EMERGENCY MEDICINE
Payer: COMMERCIAL

## 2025-04-14 VITALS
RESPIRATION RATE: 20 BRPM | DIASTOLIC BLOOD PRESSURE: 77 MMHG | TEMPERATURE: 97.7 F | SYSTOLIC BLOOD PRESSURE: 128 MMHG | OXYGEN SATURATION: 98 % | HEART RATE: 72 BPM

## 2025-04-14 DIAGNOSIS — M54.9 BACK PAIN: Primary | ICD-10-CM

## 2025-04-14 LAB
ANION GAP SERPL CALCULATED.3IONS-SCNC: 12 MMOL/L (ref 4–13)
BASOPHILS # BLD AUTO: 0.03 THOUSANDS/ÂΜL (ref 0–0.1)
BASOPHILS NFR BLD AUTO: 0 % (ref 0–1)
BUN SERPL-MCNC: 41 MG/DL (ref 5–25)
CALCIUM SERPL-MCNC: 9.8 MG/DL (ref 8.4–10.2)
CHLORIDE SERPL-SCNC: 104 MMOL/L (ref 96–108)
CO2 SERPL-SCNC: 26 MMOL/L (ref 21–32)
CREAT SERPL-MCNC: 1.56 MG/DL (ref 0.6–1.3)
EOSINOPHIL # BLD AUTO: 0.15 THOUSAND/ÂΜL (ref 0–0.61)
EOSINOPHIL NFR BLD AUTO: 2 % (ref 0–6)
ERYTHROCYTE [DISTWIDTH] IN BLOOD BY AUTOMATED COUNT: 14.9 % (ref 11.6–15.1)
GFR SERPL CREATININE-BSD FRML MDRD: 40 ML/MIN/1.73SQ M
GLUCOSE SERPL-MCNC: 152 MG/DL (ref 65–140)
HCT VFR BLD AUTO: 47 % (ref 36.5–49.3)
HGB BLD-MCNC: 14.7 G/DL (ref 12–17)
IMM GRANULOCYTES # BLD AUTO: 0.04 THOUSAND/UL (ref 0–0.2)
IMM GRANULOCYTES NFR BLD AUTO: 1 % (ref 0–2)
LYMPHOCYTES # BLD AUTO: 1.4 THOUSANDS/ÂΜL (ref 0.6–4.47)
LYMPHOCYTES NFR BLD AUTO: 16 % (ref 14–44)
MCH RBC QN AUTO: 27.8 PG (ref 26.8–34.3)
MCHC RBC AUTO-ENTMCNC: 31.3 G/DL (ref 31.4–37.4)
MCV RBC AUTO: 89 FL (ref 82–98)
MONOCYTES # BLD AUTO: 0.47 THOUSAND/ÂΜL (ref 0.17–1.22)
MONOCYTES NFR BLD AUTO: 6 % (ref 4–12)
NEUTROPHILS # BLD AUTO: 6.44 THOUSANDS/ÂΜL (ref 1.85–7.62)
NEUTS SEG NFR BLD AUTO: 75 % (ref 43–75)
NRBC BLD AUTO-RTO: 0 /100 WBCS
PLATELET # BLD AUTO: 213 THOUSANDS/UL (ref 149–390)
PMV BLD AUTO: 12.3 FL (ref 8.9–12.7)
POTASSIUM SERPL-SCNC: 4.5 MMOL/L (ref 3.5–5.3)
RBC # BLD AUTO: 5.29 MILLION/UL (ref 3.88–5.62)
SODIUM SERPL-SCNC: 142 MMOL/L (ref 135–147)
WBC # BLD AUTO: 8.53 THOUSAND/UL (ref 4.31–10.16)

## 2025-04-14 PROCEDURE — 85025 COMPLETE CBC W/AUTO DIFF WBC: CPT | Performed by: EMERGENCY MEDICINE

## 2025-04-14 PROCEDURE — 96374 THER/PROPH/DIAG INJ IV PUSH: CPT

## 2025-04-14 PROCEDURE — 74177 CT ABD & PELVIS W/CONTRAST: CPT

## 2025-04-14 PROCEDURE — 80048 BASIC METABOLIC PNL TOTAL CA: CPT | Performed by: EMERGENCY MEDICINE

## 2025-04-14 PROCEDURE — 99284 EMERGENCY DEPT VISIT MOD MDM: CPT | Performed by: EMERGENCY MEDICINE

## 2025-04-14 PROCEDURE — 71260 CT THORAX DX C+: CPT

## 2025-04-14 PROCEDURE — 36415 COLL VENOUS BLD VENIPUNCTURE: CPT | Performed by: EMERGENCY MEDICINE

## 2025-04-14 PROCEDURE — 99284 EMERGENCY DEPT VISIT MOD MDM: CPT

## 2025-04-14 RX ORDER — CYCLOBENZAPRINE HCL 10 MG
5 TABLET ORAL 2 TIMES DAILY PRN
Qty: 10 TABLET | Refills: 0 | Status: SHIPPED | OUTPATIENT
Start: 2025-04-14 | End: 2025-04-19

## 2025-04-14 RX ORDER — LIDOCAINE 50 MG/G
1 PATCH TOPICAL DAILY
Qty: 2 PATCH | Refills: 0 | Status: SHIPPED | OUTPATIENT
Start: 2025-04-14 | End: 2025-04-16

## 2025-04-14 RX ORDER — LIDOCAINE 50 MG/G
1 PATCH TOPICAL ONCE
Status: DISCONTINUED | OUTPATIENT
Start: 2025-04-14 | End: 2025-04-14 | Stop reason: HOSPADM

## 2025-04-14 RX ORDER — ACETAMINOPHEN 10 MG/ML
1000 INJECTION, SOLUTION INTRAVENOUS ONCE
Status: COMPLETED | OUTPATIENT
Start: 2025-04-14 | End: 2025-04-14

## 2025-04-14 RX ADMIN — LIDOCAINE 1 PATCH: 700 PATCH TOPICAL at 11:17

## 2025-04-14 RX ADMIN — IOHEXOL 100 ML: 350 INJECTION, SOLUTION INTRAVENOUS at 11:51

## 2025-04-14 RX ADMIN — ACETAMINOPHEN 1000 MG: 10 INJECTION INTRAVENOUS at 11:20

## 2025-04-14 NOTE — ED PROVIDER NOTES
"Time reflects when diagnosis was documented in both MDM as applicable and the Disposition within this note       Time User Action Codes Description Comment    4/14/2025  1:53 PM PieterEthel Add [M54.9] Back pain           ED Disposition       ED Disposition   Discharge    Condition   Stable    Date/Time   Mon Apr 14, 2025  1:53 PM    Comment   Thomas Horne discharge to home/self care.                   Assessment & Plan       Medical Decision Making  Patient evaluated with labs imaging.  I reviewed the results and discussed them with the patient.  Patient discharged with appropriate instructions medications and follow-up.  Patient verbalized understanding had no further questions at the time of discharge.  Patient had stable vital signs and well-appearing at the time of discharge.  I reviewed the incidental findings of the pancreatic lesion and the follow-up with his PCP.    Problems Addressed:  Back pain: acute illness or injury    Amount and/or Complexity of Data Reviewed  External Data Reviewed: notes.  Labs: ordered. Decision-making details documented in ED Course.  Radiology: ordered. Decision-making details documented in ED Course.    Risk  Prescription drug management.             Medications   lidocaine (LIDODERM) 5 % patch 1 patch (1 patch Topical Medication Applied 4/14/25 1117)   acetaminophen (Ofirmev) injection 1,000 mg (0 mg Intravenous Stopped 4/14/25 1135)   iohexol (OMNIPAQUE) 350 MG/ML injection (MULTI-DOSE) 100 mL (100 mL Intravenous Given 4/14/25 1151)       ED Risk Strat Scores                    No data recorded                            History of Present Illness       Chief Complaint   Patient presents with    Back Pain     Pt fell 4 days ago, walking in the bathroom in the middle of night getting ready to void. Pt sts \" the room went black upside down\" pt hit head on the door . 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)     Plantar fasciitis     Sleep apnea     Could not tolerate CPAP      Past Surgical History:   Procedure Laterality Date    ABDOMINAL AORTIC ANEURYSM REPAIR      Stented    ADENOIDECTOMY      BACK SURGERY  1985    CARDIAC CATHETERIZATION Left 10/19/2022    Procedure: Cardiac Left Heart Cath;  Surgeon: Danielle Pereira MD;  Location: [facility];  Service: Cardiology    CARDIAC ELECTROPHYSIOLOGY PROCEDURE Left 12/16/2024    Procedure: Cardiac pacer implant;  Surgeon: Danielle Pereira MD;  Location: [facility];  Service: Cardiology    CARDIAC SURGERY  2002    3 cardiac bypass then angioplasty 7/2020    CHOLECYSTECTOMY      COLONOSCOPY      CORONARY ARTERY BYPASS GRAFT      IR LOWER EXTREMITY ANGIOGRAM  11/01/2023    IR LOWER EXTREMITY ANGIOGRAM  08/07/2024    IR LOWER EXTREMITY ANGIOGRAM  01/07/2025    LAMINECTOMY  1990    DE BYPASS W/VEIN FEMORAL-POPLITEAL Right 11/16/2023    Procedure: BYPASS FEMORAL-POPLITEAL WITH CRYO VEIN, RIGHT FEMORAL ENDARTERECTOMY;  Surgeon: Vasquez Clark MD;  Location: [facility];  Service: Vascular    DE BYPASS W/VEIN FEMORAL-POPLITEAL Left 08/30/2024    Procedure: left lower extremity above knee popliteal to below knee popliteal artery bypass with PTFE graft;  Surgeon: Vasquez Clark MD;  Location: [facility];  Service: Vascular    DE SLCTV CATHJ 3RD+ ORD SLCTV ABDL PEL/LXTR BRNCH Right 11/01/2023    Procedure: ARTERIOGRAM Right lower extremity  arteriogram with CO2 via right groin access;  Surgeon: Vasquez Clark MD;  Location: [facility];  Service: Vascular    NV SLCTV CATHJ 3RD+ ORD SLCTV ABDL PEL/LXTR BRNCH Left 2024    Procedure: diagnostic LLE Arteriogram;  Surgeon: Vasquez Clark MD;  Location: [facility];  Service: Vascular    PROSTATE SURGERY      TONSILLECTOMY      URINARY SPHINCTER IMPLANT        Family History   Problem Relation Age of Onset    Diabetes Mother     Alcohol abuse Father     Heart disease Brother     Cancer Sister         Thyroid    Cancer Sister         Colon    Cancer Brother         Throat    Thyroid disease Brother     Cancer Brother     Colon cancer Brother     Diabetes Sister     Thyroid disease Sister     Colon cancer Sister     Mental illness Neg Hx       Social History     Tobacco Use    Smoking status: Former     Current packs/day: 0.00     Average packs/day: 1.5 packs/day for 35.0 years (52.5 ttl pk-yrs)     Types: Cigarettes     Start date: 1956     Quit date:      Years since quittin.3     Passive exposure: Past    Smokeless tobacco: Never   Vaping Use    Vaping status: Never Used   Substance Use Topics    Alcohol use: Yes     Alcohol/week: 14.0 standard drinks of alcohol     Types: 14 Cans of beer per week     Comment: stopped last few months    Drug use: Never      E-Cigarette/Vaping    E-Cigarette Use Never User       E-Cigarette/Vaping Substances    Nicotine No     THC No     CBD No     Flavoring No     Other No     Unknown No       I have reviewed and agree with the history as documented.     80-year-old male presents with right lower back pain after a fall 2 days ago.  He says he got a little weak in his knees fell down onto his right side hit his head on the wall however denies loss of consciousness headache neck pain, upper back pain nausea vomiting or any other symptoms he is on Plavix and aspirin.  He is having trouble putting weight on that leg because of the back  pain.      History provided by:  Patient   used: No        Review of Systems   Constitutional: Negative.    HENT: Negative.     Eyes: Negative.    Respiratory: Negative.     Cardiovascular: Negative.    Gastrointestinal: Negative.    Endocrine: Negative.    Genitourinary: Negative.    Musculoskeletal:  Positive for back pain.   Skin: Negative.    Allergic/Immunologic: Negative.    Neurological: Negative.    Hematological: Negative.    Psychiatric/Behavioral: Negative.     All other systems reviewed and are negative.          Objective       ED Triage Vitals [04/14/25 1043]   Temperature Pulse Blood Pressure Respirations SpO2 Patient Position - Orthostatic VS   97.7 °F (36.5 °C) 72 128/77 20 98 % --      Temp Source Heart Rate Source BP Location FiO2 (%) Pain Score    Tympanic -- -- -- --      Vitals      Date and Time Temp Pulse SpO2 Resp BP Pain Score FACES Pain Rating User   04/14/25 1401 -- 72 -- -- 128/77 -- -- KD   04/14/25 1043 97.7 °F (36.5 °C) 72 98 % 20 128/77 -- -- SEBASTIAN            Physical Exam  Vitals and nursing note reviewed.   Constitutional:       Appearance: Normal appearance.   HENT:      Head: Normocephalic and atraumatic.      Nose: Nose normal.      Mouth/Throat:      Mouth: Mucous membranes are moist.   Eyes:      Extraocular Movements: Extraocular movements intact.      Pupils: Pupils are equal, round, and reactive to light.   Cardiovascular:      Rate and Rhythm: Normal rate and regular rhythm.   Pulmonary:      Effort: Pulmonary effort is normal.      Breath sounds: Normal breath sounds.   Abdominal:      General: Abdomen is flat. Bowel sounds are normal.      Palpations: Abdomen is soft.   Musculoskeletal:         General: Normal range of motion.      Cervical back: Normal range of motion and neck supple.      Comments: Right-sided lumbar paravertebral tenderness noted no step-offs no rash noted no midline tenderness.   Skin:     General: Skin is warm.      Capillary  Refill: Capillary refill takes less than 2 seconds.   Neurological:      General: No focal deficit present.      Mental Status: He is alert and oriented to person, place, and time. Mental status is at baseline.   Psychiatric:         Mood and Affect: Mood normal.         Thought Content: Thought content normal.         Results Reviewed       Procedure Component Value Units Date/Time    Basic metabolic panel [[id number]]  (Abnormal) Collected: 04/14/25 1114    Lab Status: Final result Specimen: Blood from Arm, Left Updated: 04/14/25 1137     Sodium 142 mmol/L      Potassium 4.5 mmol/L      Chloride 104 mmol/L      CO2 26 mmol/L      ANION GAP 12 mmol/L      BUN 41 mg/dL      Creatinine 1.56 mg/dL      Glucose 152 mg/dL      Calcium 9.8 mg/dL      eGFR 40 ml/min/1.73sq m     Narrative:      National Kidney Disease Foundation guidelines for Chronic Kidney Disease (CKD):     Stage 1 with normal or high GFR (GFR > 90 mL/min/1.73 square meters)    Stage 2 Mild CKD (GFR = 60-89 mL/min/1.73 square meters)    Stage 3A Moderate CKD (GFR = 45-59 mL/min/1.73 square meters)    Stage 3B Moderate CKD (GFR = 30-44 mL/min/1.73 square meters)    Stage 4 Severe CKD (GFR = 15-29 mL/min/1.73 square meters)    Stage 5 End Stage CKD (GFR <15 mL/min/1.73 square meters)  Note: GFR calculation is accurate only with a steady state creatinine    CBC and differential [[id number]]  (Abnormal) Collected: 04/14/25 1114    Lab Status: Final result Specimen: Blood from Arm, Left Updated: 04/14/25 1121     WBC 8.53 Thousand/uL      RBC 5.29 Million/uL      Hemoglobin 14.7 g/dL      Hematocrit 47.0 %      MCV 89 fL      MCH 27.8 pg      MCHC 31.3 g/dL      RDW 14.9 %      MPV 12.3 fL      Platelets 213 Thousands/uL      nRBC 0 /100 WBCs      Segmented % 75 %      Immature Grans % 1 %      Lymphocytes % 16 %      Monocytes % 6 %      Eosinophils Relative 2 %      Basophils Relative 0 %      Absolute Neutrophils 6.44 Thousands/µL      Absolute Immature  Grans 0.04 Thousand/uL      Absolute Lymphocytes 1.40 Thousands/µL      Absolute Monocytes 0.47 Thousand/µL      Eosinophils Absolute 0.15 Thousand/µL      Basophils Absolute 0.03 Thousands/µL             CT chest abdomen pelvis w contrast   Final Interpretation by Maryann Soriano MD (04/14 4737)      1. No acute findings in the chest, abdomen, or pelvis.      2. Left lower lobe solid nodule measuring 1.5 cm, stable from 9/6/2024, previously 0.8 cm on 8/2/2022. Recommend PET/CT and/or tissue sampling if not already performed.      3. 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 Recommendation is for follow-up low radiation dose chest CT in one year.      4. Post EVAR. Unchanged infrarenal abdominal aortic aneurysm sac measuring up to 5.3 cm.      5. Pancreatic cystic lesions measuring up to 1.4 cm.   -Management recommendation: Follow-up 6 months once, then every 18 months. Currently, patient has demonstrate stability since 9/6/2024. Recommend next follow-up in 18 months. Endpoint determined by clinician. Preferred imaging modality: abdomen MRI and    MRCP with and without IV contrast, or triple phase abdomen CT with IV contrast, or abdomen MRI and MRCP without IV contrast.            The study was marked in EPIC for immediate notification.               Workstation performed: [id number]             Procedures    ED Medication and Procedure Management   Prior to Admission Medications   Prescriptions Last Dose Informant Patient Reported? Taking?   Blood Glucose Monitoring Suppl (OneTouch Verio Reflect) w/Device KIT  Self No No   Sig: Check blood sugars twice daily. Please substitute with appropriate alternative as covered by patient's insurance. Dx: E11.65   DULoxetine (CYMBALTA) 30 mg delayed release capsule  Self No No   Sig: Take 1 capsule (30 mg total) by mouth daily   Empagliflozin (Jardiance) 25 MG TABS  Self No No   Sig: TAKE 1 TABLET BY MOUTH DAILY   Multiple  Vitamins-Minerals (MULTIVITAMIN MEN 50+ PO)  Self Yes No   Sig: Take by mouth daily   Omega-3 Fatty Acids (fish oil) 1,000 mg  Self Yes No   Sig: Take 4,000 mg by mouth 2 (two) times a day   OneTouch Delica Lancets 33G MISC  Self No No   Sig: Check blood sugars twice daily. Please substitute with appropriate alternative as covered by patient's insurance. Dx: E11.65   Sure Comfort Pen Needles 32G X 4 MM MISC   No No   Sig: TAKE ONE TABLET BY MOUTH DAILY   acetaminophen (TYLENOL) 325 mg tablet  Self No No   Sig: Take 2 tablets (650 mg total) by mouth every 6 (six) hours as needed for mild pain   amLODIPine (NORVASC) 2.5 mg tablet  Self Yes No   Sig: Take 2.5 mg by mouth daily   apixaban (Eliquis) 5 mg   No No   Sig: Take 1 tablet (5 mg total) by mouth 2 (two) times a day   atorvastatin (LIPITOR) 40 mg tablet  Self No No   Sig: Take 1 tablet (40 mg total) by mouth daily with dinner   clopidogrel (PLAVIX) 75 mg tablet   No No   Sig: Take 1 tablet (75 mg total) by mouth daily   famotidine (PEPCID) 40 MG tablet  Self No No   Sig: Take 1 tablet (40 mg total) by mouth daily at bedtime   fenofibrate 160 MG tablet  Self No No   Sig: TAKE 1 TABLET BY MOUTH DAILY   ferrous sulfate 324 (65 Fe) mg  Self No No   Sig: Take 1 tablet (324 mg total) by mouth every other day   glucose blood (OneTouch Verio) test strip   No No   Sig: TEST TWICE A DAY   insulin glargine (LANTUS) 100 units/mL subcutaneous injection  Self No No   Sig: Inject 15 Units under the skin daily at bedtime   isosorbide mononitrate (IMDUR) 30 mg 24 hr tablet  Self No No   Sig: Take 3 tablets (90 mg total) by mouth daily   metFORMIN (GLUCOPHAGE) 500 mg tablet  Self No No   Sig: TAKE 1 TABLET BY MOUTH TWICE  DAILY WITH MEALS   metoprolol succinate (TOPROL-XL) 100 mg 24 hr tablet  Self No No   Sig: TAKE 1 TABLET (100 MG TOTAL) BY MOUTH DAILY   nitroglycerin (NITROSTAT) 0.4 mg SL tablet  Self No No   Sig: Place 1 tablet (0.4 mg total) under the tongue every 5 (five)  minutes as needed for chest pain   Patient not taking: Reported on 1/15/2025   ondansetron (ZOFRAN) 4 mg tablet  Self No No   Sig: Take 1 tablet (4 mg total) by mouth every 12 (twelve) hours as needed for nausea or vomiting   oxyCODONE-acetaminophen (PERCOCET) 5-325 mg per tablet  Self Yes No   Sig: Take 1 tablet by mouth 2 (two) times a day as needed for moderate pain or severe pain   pantoprazole (PROTONIX) 40 mg tablet  Self No No   Sig: TAKE 1 TABLET BY MOUTH DAILY   pentoxifylline (TRENtal) 400 mg ER tablet  Self No No   Sig: TAKE 1 TABLET BY MOUTH 3 TIMES  DAILY WITH MEALS   pregabalin (LYRICA) 150 mg capsule  Self No No   Sig: Take 1 capsule (150 mg total) by mouth 3 (three) times a day   ranolazine (RANEXA) 1000 MG SR tablet  Self No No   Sig: Take 1 tablet (1,000 mg total) by mouth 2 (two) times a day   sitaGLIPtin (JANUVIA) 25 mg tablet  Self No No   Sig: Take 1 tablet (25 mg total) by mouth daily   torsemide (DEMADEX) 20 mg tablet  Self No No   Sig: TAKE 1 TABLET BY MOUTH DAILY      Facility-Administered Medications: None     Discharge Medication List as of 4/14/2025  2:39 PM        START taking these medications    Details   cyclobenzaprine (FLEXERIL) 10 mg tablet Take 0.5 tablets (5 mg total) by mouth 2 (two) times a day as needed for muscle spasms for up to 5 days, Starting Mon 4/14/2025, Until Sat 4/19/2025 at 2359, Normal      lidocaine (LIDODERM) 5 % Apply 1 patch topically over 12 hours daily for 2 days Remove & Discard patch within 12 hours or as directed by MD, Starting Mon 4/14/2025, Until Wed 4/16/2025, Normal           CONTINUE these medications which have NOT CHANGED    Details   acetaminophen (TYLENOL) 325 mg tablet Take 2 tablets (650 mg total) by mouth every 6 (six) hours as needed for mild pain, Starting Wed 9/4/2024, OTC      amLODIPine (NORVASC) 2.5 mg tablet Take 2.5 mg by mouth daily, Starting Mon 3/17/2025, Historical Med      apixaban (Eliquis) 5 mg Take 1 tablet (5 mg total) by  mouth 2 (two) times a day, Starting Wed 3/26/2025, Normal      atorvastatin (LIPITOR) 40 mg tablet Take 1 tablet (40 mg total) by mouth daily with dinner, Starting Wed 2/19/2025, Normal      Blood Glucose Monitoring Suppl (OneTouch Verio Reflect) w/Device KIT Check blood sugars twice daily. Please substitute with appropriate alternative as covered by patient's insurance. Dx: E11.65, Normal      clopidogrel (PLAVIX) 75 mg tablet Take 1 tablet (75 mg total) by mouth daily, Starting Tue 4/8/2025, Normal      DULoxetine (CYMBALTA) 30 mg delayed release capsule Take 1 capsule (30 mg total) by mouth daily, Starting Mon 3/10/2025, Normal      Empagliflozin (Jardiance) 25 MG TABS TAKE 1 TABLET BY MOUTH DAILY, Starting Mon 12/23/2024, Normal      famotidine (PEPCID) 40 MG tablet Take 1 tablet (40 mg total) by mouth daily at bedtime, Starting Thu 2/20/2025, Until Wed 5/21/2025, Normal      fenofibrate 160 MG tablet TAKE 1 TABLET BY MOUTH DAILY, Starting Tue 12/24/2024, Normal      ferrous sulfate 324 (65 Fe) mg Take 1 tablet (324 mg total) by mouth every other day, Starting Tue 1/21/2025, No Print      glucose blood (OneTouch Verio) test strip TEST TWICE A DAY, Normal      insulin glargine (LANTUS) 100 units/mL subcutaneous injection Inject 15 Units under the skin daily at bedtime, Starting Tue 2/18/2025, Until Wed 2/18/2026, Normal      isosorbide mononitrate (IMDUR) 30 mg 24 hr tablet Take 3 tablets (90 mg total) by mouth daily, Starting Tue 3/18/2025, Normal      metFORMIN (GLUCOPHAGE) 500 mg tablet TAKE 1 TABLET BY MOUTH TWICE  DAILY WITH MEALS, Starting Wed 12/4/2024, Normal      metoprolol succinate (TOPROL-XL) 100 mg 24 hr tablet TAKE 1 TABLET (100 MG TOTAL) BY MOUTH DAILY, Starting Fri 3/7/2025, Normal      Multiple Vitamins-Minerals (MULTIVITAMIN MEN 50+ PO) Take by mouth daily, Historical Med      nitroglycerin (NITROSTAT) 0.4 mg SL tablet Place 1 tablet (0.4 mg total) under the tongue every 5 (five) minutes as  needed for chest pain, Starting Fri 5/10/2024, Until Fri 12/20/2024 at 2359, Normal      Omega-3 Fatty Acids (fish oil) 1,000 mg Take 4,000 mg by mouth 2 (two) times a day, Historical Med      ondansetron (ZOFRAN) 4 mg tablet Take 1 tablet (4 mg total) by mouth every 12 (twelve) hours as needed for nausea or vomiting, Starting Thu 2/20/2025, Normal      OneTouch Delica Lancets 33G MISC Check blood sugars twice daily. Please substitute with appropriate alternative as covered by patient's insurance. Dx: E11.65, Normal      oxyCODONE-acetaminophen (PERCOCET) 5-325 mg per tablet Take 1 tablet by mouth 2 (two) times a day as needed for moderate pain or severe pain, Historical Med      pantoprazole (PROTONIX) 40 mg tablet TAKE 1 TABLET BY MOUTH DAILY, Starting Mon 2/17/2025, Normal      pentoxifylline (TRENtal) 400 mg ER tablet TAKE 1 TABLET BY MOUTH 3 TIMES  DAILY WITH MEALS, Starting Mon 9/16/2024, Normal      pregabalin (LYRICA) 150 mg capsule Take 1 capsule (150 mg total) by mouth 3 (three) times a day, Starting Mon 3/10/2025, Until Sun 6/8/2025, Normal      ranolazine (RANEXA) 1000 MG SR tablet Take 1 tablet (1,000 mg total) by mouth 2 (two) times a day, Starting Tue 3/11/2025, Until Fri 3/6/2026, Normal      sitaGLIPtin (JANUVIA) 25 mg tablet Take 1 tablet (25 mg total) by mouth daily, Starting Wed 12/18/2024, Normal      Sure Comfort Pen Needles 32G X 4 MM MISC TAKE ONE TABLET BY MOUTH DAILY, Starting Thu 4/10/2025, Normal      torsemide (DEMADEX) 20 mg tablet TAKE 1 TABLET BY MOUTH DAILY, Starting Wed 11/27/2024, Normal           No discharge procedures on file.  ED SEPSIS DOCUMENTATION   Time reflects when diagnosis was documented in both MDM as applicable and the Disposition within this note       Time User Action Codes Description Comment    4/14/2025  1:53 PM Ethel Stapleton [M54.9] Back pain                  Ethel Stapleton,   04/14/25 4541

## 2025-04-14 NOTE — DISCHARGE INSTRUCTIONS
: Cystic pancreatic lesion   Follow-up 6 months once, then every 18 months. Currently, patient has demonstrate stability since 9/6/2024. Recommend next follow-up in 18 months. Endpoint determined by clinician. Preferred imaging modality: abdomen MRI and MRCP with and without IV contrast, or triple phase abdomen CT with IV contrast, or abdomen MRI and MRCP without IV contrast.

## 2025-04-14 NOTE — TELEPHONE ENCOUNTER
Patient called the RX Refill Line. Message is being forwarded to the office.     Patient called stating that he is currently in the ER and will not be able to make his appt today at 11:30. Patient will call back to reschedule when is can.

## 2025-04-15 ENCOUNTER — PATIENT OUTREACH (OUTPATIENT)
Dept: CASE MANAGEMENT | Facility: OTHER | Age: 80
End: 2025-04-15

## 2025-04-15 ENCOUNTER — VBI (OUTPATIENT)
Dept: FAMILY MEDICINE CLINIC | Facility: CLINIC | Age: 80
End: 2025-04-15

## 2025-04-15 NOTE — TELEPHONE ENCOUNTER
04/15/25 8:41 AM    Patient contacted post ED visit, first outreach attempt made. Message was left for patient to return a call to the VBI Department at [facility]: Phone [phone].    Thank you.  HAN LIVE MA  [facility]

## 2025-04-16 ENCOUNTER — PATIENT OUTREACH (OUTPATIENT)
Dept: CASE MANAGEMENT | Facility: OTHER | Age: 80
End: 2025-04-16

## 2025-04-16 NOTE — PROGRESS NOTES
Outpatient Care Management note- CM referral, VB    Chart review completed    Call initiated to the patient and left voicemail for a return call.     Patient with h/o HLD, HTN, CAD s/p CABG, angioplasty with stent, pacemaker, AAA repair, prostate CA s/p prostatectomy, DM2, PAD, IVY, CKA, CHF, anemia, DVT    Patient was evaluated in [facility] ED on 4/14/25 for back pain s/p fall 4 days prior. Patient reported he was walking into the bathroom at night and the room went black and upside down and he hit his head on the door. Exam notes right sided lumbar paravertebral tenderness. Workup done including labs and CT chest/abd/pelvis. Lidocaine patch placed and patient was sent home with rx for Flexeril 5 mg BID PRN and Lidocaine patches. He was d/c to home. He is recommended follow up with his PCP and return to the ED if symptoms worsen.     The patient is not scheduled with his PCP until 8/18/25.    Await a call back from the patient.

## 2025-04-16 NOTE — TELEPHONE ENCOUNTER
04/16/25 9:59 AM    Patient contacted post ED visit, VBI department spoke with patient/caregiver and outreach was successful.    Thank you.  HAN LIVE MA  [facility]

## 2025-04-16 NOTE — TELEPHONE ENCOUNTER
04/16/25 9:18 AM    Patient contacted post ED visit, second outreach attempt made. Message was left for patient to return a call to the VBI Department at [facility]: Phone [phone].    Thank you.  HAN LIVE MA  [facility]

## 2025-04-19 DIAGNOSIS — Z79.4 TYPE 2 DIABETES MELLITUS WITH DIABETIC PERIPHERAL ANGIOPATHY WITHOUT GANGRENE, WITH LONG-TERM CURRENT USE OF INSULIN (HCC): Primary | ICD-10-CM

## 2025-04-19 DIAGNOSIS — E11.51 TYPE 2 DIABETES MELLITUS WITH DIABETIC PERIPHERAL ANGIOPATHY WITHOUT GANGRENE, WITH LONG-TERM CURRENT USE OF INSULIN (HCC): Primary | ICD-10-CM

## 2025-04-21 RX ORDER — SITAGLIPTIN 100 MG/1
100 TABLET, FILM COATED ORAL DAILY
Qty: 90 TABLET | Refills: 3 | Status: SHIPPED | OUTPATIENT
Start: 2025-04-21

## 2025-04-23 ENCOUNTER — PROCEDURE VISIT (OUTPATIENT)
Age: 80
End: 2025-04-23
Payer: COMMERCIAL

## 2025-04-23 ENCOUNTER — PATIENT OUTREACH (OUTPATIENT)
Dept: CASE MANAGEMENT | Facility: OTHER | Age: 80
End: 2025-04-23

## 2025-04-23 VITALS — BODY MASS INDEX: 28.75 KG/M2 | RESPIRATION RATE: 17 BRPM | HEIGHT: 74 IN | WEIGHT: 224 LBS

## 2025-04-23 DIAGNOSIS — I70.209 PERIPHERAL ARTERIOSCLEROSIS (HCC): ICD-10-CM

## 2025-04-23 DIAGNOSIS — L97.511 DIABETIC ULCER OF TOE OF RIGHT FOOT ASSOCIATED WITH TYPE 2 DIABETES MELLITUS, LIMITED TO BREAKDOWN OF SKIN (HCC): ICD-10-CM

## 2025-04-23 DIAGNOSIS — E11.42 DIABETIC POLYNEUROPATHY ASSOCIATED WITH TYPE 2 DIABETES MELLITUS (HCC): Primary | ICD-10-CM

## 2025-04-23 DIAGNOSIS — E11.621 DIABETIC ULCER OF TOE OF RIGHT FOOT ASSOCIATED WITH TYPE 2 DIABETES MELLITUS, LIMITED TO BREAKDOWN OF SKIN (HCC): ICD-10-CM

## 2025-04-23 DIAGNOSIS — B35.1 ONYCHOMYCOSIS: ICD-10-CM

## 2025-04-23 PROCEDURE — 99213 OFFICE O/P EST LOW 20 MIN: CPT | Performed by: PODIATRIST

## 2025-04-23 NOTE — PROGRESS NOTES
Outpatient Care Management note- follow up call    Chart review completed    Initial outreach to the patient via phone on 4/16/25 with no response. 2nd call placed today and left voicemail to return call. Will follow up with an unable to reach letter as well. Sent via [place].     Await a call back from the patient.

## 2025-04-23 NOTE — PROGRESS NOTES
Assessment/Plan: Diabetic foot third of lesion/Sumner grade 0 ulcer x 2 right hallux.  Diabetic neuropathy.  Peripheral artery disease.  Pain.     Plan.  Chart reviewed.  PCP notes reviewed.  Wound healing center notes reviewed.  Patient examined.  At this time we agree with present treatment regimen.  Patient is to continue visitation at wound healing center.  Recommend continuation of HBO.  Patient will follow with vascular surgery.  Patient needs arteriogram of right lower extremity patient will follow with wound healing center.  Watch for signs of infection.  Patient must remain on Trental as ordered.  This will be refilled as indicated.  Patient educated on care of the diabetic foot.  Aftercare instruction given.  Watch for signs of infection or further ulceration.  Recommend continuation of Cymbalta        Assessment  Diagnoses and all orders for this visit:     Diabetic ulcer Sumner grade 0, x 2 right hallux.     Peripheral arteriosclerosis (HCC)     Diabetic polyneuropathy associated with type 2 diabetes mellitus (HCC)     Left foot pain     Onychomycosis              Subjective: Patient is diabetic.  He has peripheral artery disease.  Patient is status post left lower extremity arterial bypass.  He has much less pain in the leg.  He is attending wound healing sessions.                           Allergies   Allergen Reactions    Lisinopril Rash and Lip Swelling            Current Medications      Current Outpatient Medications:     acetaminophen (TYLENOL) 325 mg tablet, Take 2 tablets (650 mg total) by mouth every 6 (six) hours as needed for mild pain, Disp: , Rfl:     amLODIPine (NORVASC) 10 mg tablet, Take 0.5 tablets (5 mg total) by mouth daily, Disp: 45 tablet, Rfl: 1    aspirin 81 mg chewable tablet, Chew 81 mg daily, Disp: , Rfl:     atorvastatin (LIPITOR) 40 mg tablet, Take 1 tablet (40 mg total) by mouth daily, Disp: 90 tablet, Rfl: 1    Blood Glucose Monitoring Suppl (OneTouch Verio Reflect)  w/Device KIT, Check blood sugars twice daily. Please substitute with appropriate alternative as covered by patient's insurance. Dx: E11.65, Disp: 1 kit, Rfl: 0    cloNIDine (CATAPRES) 0.1 mg tablet, take 1 tablet by mouth every 12 hours, Disp: 180 tablet, Rfl: 1    clopidogrel (PLAVIX) 75 mg tablet, Take 1 tablet (75 mg total) by mouth daily, Disp: 30 tablet, Rfl: 3    collagenase (SANTYL) ointment, Apply topically daily (Patient not taking: Reported on 9/10/2024), Disp: 30 g, Rfl: 0    Droplet Pen Needles 32G X 4 MM MISC, USE EVERY EVENING, Disp: 100 each, Rfl: 5    DULoxetine (CYMBALTA) 30 mg delayed release capsule, take 1 capsule by mouth once daily, Disp: 90 capsule, Rfl: 0    Empagliflozin (Jardiance) 25 MG TABS, TAKE 1 TABLET BY MOUTH DAILY, Disp: 90 tablet, Rfl: 1    fenofibrate 160 MG tablet, TAKE 1 TABLET BY MOUTH DAILY, Disp: 100 tablet, Rfl: 1    glucose blood (OneTouch Verio) test strip, TEST TWICE A DAY, Disp: 200 strip, Rfl: 5    insulin glargine (LANTUS) 100 units/mL subcutaneous injection, Inject 15 Units under the skin daily at bedtime (Patient taking differently: Inject 15 Units under the skin daily at bedtime If BS > 150), Disp: 10 mL, Rfl: 0    isosorbide mononitrate (IMDUR) 30 mg 24 hr tablet, Take 3 tablets (90 mg total) by mouth daily, Disp: 270 tablet, Rfl: 1    ketoconazole (NIZORAL) 2 % cream, Apply topically daily (Patient not taking: Reported on 9/17/2024), Disp: 60 g, Rfl: 1    metFORMIN (GLUCOPHAGE) 500 mg tablet, Take 1 tablet (500 mg total) by mouth 2 (two) times a day with meals, Disp: 180 tablet, Rfl: 1    metoprolol tartrate (LOPRESSOR) 50 mg tablet, Take 0.5 tablets (25 mg total) by mouth every 12 (twelve) hours (Patient taking differently: Take 25 mg by mouth 2 (two) times a day), Disp: 90 tablet, Rfl: 1    Multiple Vitamins-Minerals (MULTIVITAMIN MEN 50+ PO), Take by mouth daily, Disp: , Rfl:     nitroglycerin (NITROSTAT) 0.4 mg SL tablet, Place 1 tablet (0.4 mg total) under  the tongue every 5 (five) minutes as needed for chest pain, Disp: 30 tablet, Rfl: 0    Omega-3 Fatty Acids (fish oil) 1,000 mg, Take 4,000 mg by mouth 2 (two) times a day, Disp: , Rfl:     ondansetron (ZOFRAN) 8 mg tablet, Take 0.5 tablets (4 mg total) by mouth every 6 (six) hours as needed for nausea or vomiting, Disp: 20 tablet, Rfl: 0    OneTouch Delica Lancets 33G MISC, Check blood sugars twice daily. Please substitute with appropriate alternative as covered by patient's insurance. Dx: E11.65, Disp: 200 each, Rfl: 3    oxyCODONE-acetaminophen (Percocet) 5-325 mg per tablet, Take 1 tablet by mouth 2 (two) times a day as needed for moderate pain or severe pain Max Daily Amount: 2 tablets, Disp: 60 tablet, Rfl: 0    pantoprazole (PROTONIX) 40 mg tablet, Take 1 tablet (40 mg total) by mouth daily, Disp: 90 tablet, Rfl: 1    pentoxifylline (TRENtal) 400 mg ER tablet, TAKE 1 TABLET BY MOUTH 3 TIMES  DAILY WITH MEALS, Disp: 270 tablet, Rfl: 3    pregabalin (LYRICA) 150 mg capsule, Take 1 capsule (150 mg total) by mouth 3 (three) times a day, Disp: 90 capsule, Rfl: 0    ranolazine (RANEXA) 1000 MG SR tablet, Take 1 tablet (1,000 mg total) by mouth 2 (two) times a day, Disp: 180 tablet, Rfl: 3    senna (SENOKOT) 8.6 mg, Take 1 tablet (8.6 mg total) by mouth daily Stool softener for constipation-- hold loose stools (Patient not taking: Reported on 9/11/2024), Disp: , Rfl:     sitaGLIPtin (Januvia) 100 mg tablet, TAKE 1 TABLET BY MOUTH DAILY, Disp: 100 tablet, Rfl: 1    torsemide (DEMADEX) 20 mg tablet, TAKE 1 TABLET BY MOUTH DAILY, Disp: 90 tablet, Rfl: 1         Problem List         Patient Active Problem List   Diagnosis    Mixed hyperlipidemia    Primary hypertension    Coronary artery disease involving native coronary artery of native heart without angina pectoris    S/P angioplasty with stent    Hx of CABG    S/P AAA repair    S/P prostatectomy    H/O prostate cancer    Type 2 diabetes mellitus with diabetic  polyneuropathy, with long-term current use of insulin (HCC)    Varicose veins of left lower extremity    History of endovascular stent graft for abdominal aortic aneurysm (AAA)    Bruit (arterial)    Peripheral artery disease (HCC)    Type 2 diabetes mellitus with diabetic peripheral angiopathy without gangrene, with long-term current use of insulin (HCC)    Actinic keratoses    Acute kidney injury superimposed on chronic kidney disease  (HCC)    Platelets decreased (HCC)    Gross hematuria    Chronic HFpEF/Moderate pHTN (HFpEF) (HCC)    Mild aortic stenosis    Chronic kidney disease-mineral and bone disorder    Stable angina pectoris    Hypertensive urgency    Elevated troponin level not due myocardial infarction    Diabetic ulcer of right midfoot associated with diabetes mellitus due to underlying condition, limited to breakdown of skin (HCC)    Acute on chronic diastolic heart failure (HCC)    Stage 3b chronic kidney disease (HCC)    Frequent PVCs    Melena    Symptomatic anemia    Multiple gastric ulcers    Iron deficiency anemia due to chronic blood loss    Duodenal ulcer    History of colon polyps    Shortness of breath    Iron deficiency anemia    Acute deep vein thrombosis (DVT) of left peroneal vein (HCC)    Plantar fasciitis, right    Diabetic ulcer of toe of left foot associated with type 2 diabetes mellitus, limited to breakdown of skin (HCC)    History of DVT (deep vein thrombosis)    SOB (shortness of breath)    Nausea    Elevated troponin    Foot ulcer (HCC)               Subjective  Patient ID: Thomas Horne is a 80 y.o. male.     HPI     The following portions of the patient's history were reviewed and updated as appropriate: He  has a past medical history of Anemia, CAD (coronary artery disease), Callus, Cancer (HCC), CHF (congestive heart failure) (HCC), Chronic kidney disease, Clotting disorder (HCC), Coronary artery disease (1988), Deep vein thrombosis (HCC), Diabetes mellitus ([facility]), Difficulty  walking, Duodenal ulcer, Heart disease (1988), Hyperlipidemia, Hypertension, Myocardial infarction ([facility]), Neuropathy, Neuropathy in diabetes ([facility]), Plantar fasciitis, and Sleep apnea.  He           Patient Active Problem List     Diagnosis Date Noted    Foot ulcer ([facility]) 09/07/2024    SOB (shortness of breath) 09/06/2024    Nausea 09/06/2024    Elevated troponin 09/06/2024    History of DVT (deep vein thrombosis) 08/26/2024    Diabetic ulcer of toe of left foot associated with type 2 diabetes mellitus, limited to breakdown of skin ([facility]) 08/08/2024    Plantar fasciitis, right 07/10/2024    Acute deep vein thrombosis (DVT) of left peroneal vein ([facility]) 05/01/2024    Iron deficiency anemia 04/23/2024    Shortness of breath 04/09/2024    History of colon polyps 02/22/2024    Duodenal ulcer 02/01/2024    Multiple gastric ulcers 12/27/2023    Iron deficiency anemia due to chronic blood loss 12/27/2023    Melena 12/26/2023    Symptomatic anemia 12/26/2023    Frequent PVCs 06/17/2023    Acute on chronic diastolic heart failure ([facility]) 06/16/2023    Stage 3b chronic kidney disease ([facility]) 06/16/2023    Diabetic ulcer of right midfoot associated with diabetes mellitus due to underlying condition, limited to breakdown of skin ([facility]) 06/15/2023    Hypertensive urgency 05/11/2023    Elevated troponin level not due myocardial infarction 05/11/2023    Stable angina pectoris 03/07/2023    Chronic kidney disease-mineral and bone disorder 11/30/2022    Mild aortic stenosis 11/11/2022    Chronic HFpEF/Moderate pHTN (HFpEF) ([facility]) 11/10/2022    Gross hematuria 07/26/2022    Platelets decreased ([facility]) 07/22/2022    Acute kidney injury superimposed on chronic kidney disease  ([facility]) 02/16/2022    Actinic keratoses 01/14/2022    Type 2 diabetes mellitus with diabetic peripheral angiopathy without gangrene, with long-term current use of insulin ([facility]) 01/11/2022    Varicose veins of left lower extremity 06/25/2021    History of endovascular stent graft  for abdominal aortic aneurysm (AAA) 06/25/2021    Bruit (arterial) 06/25/2021    Peripheral artery disease (HCC) 06/25/2021    Type 2 diabetes mellitus with diabetic polyneuropathy, with long-term current use of insulin (HCC) 06/10/2021    Mixed hyperlipidemia 05/11/2021    Primary hypertension 05/11/2021    Coronary artery disease involving native coronary artery of native heart without angina pectoris 05/11/2021    S/P angioplasty with stent 05/11/2021    Hx of CABG 05/11/2021    S/P AAA repair 05/11/2021    S/P prostatectomy 05/11/2021    H/O prostate cancer 05/11/2021      He  has a past surgical history that includes Cardiac surgery (2002); Tonsillectomy; ADENOIDECTOMY; Coronary artery bypass graft; Cholecystectomy; Prostate surgery; Cardiac catheterization (Left, 10/19/2022); Colonoscopy (02/14/2024); pr slctv cathj 3rd+ ord slctv abdl pel/lxtr brnch (Right, 11/01/2023); IR lower extremity angiogram (11/01/2023); pr bypass w/vein femoral-popliteal (Right, 11/16/2023); Abdominal aortic aneurysm repair; Urinary sphincter implant; IR lower extremity angiogram (08/07/2024); pr slctv cathj 3rd+ ord slctv abdl pel/lxtr brnch (Left, 08/07/2024); Back surgery (1985); Laminectomy (1990); and pr bypass w/vein femoral-popliteal (Left, 8/30/2024).  His family history includes Alcohol abuse in his father; Cancer in his brother, brother, sister, and sister; Diabetes in his mother and sister; Heart disease in his brother.  He  reports that he quit smoking about 36 years ago. His smoking use included cigarettes. He started smoking about 68 years ago. He has a 49.5 pack-year smoking history. He has been exposed to tobacco smoke. He has never used smokeless tobacco. He reports that he does not currently use alcohol after a past usage of about 5.0 standard drinks of alcohol per week. He reports that he does not use drugs.  Current Rx                   He is allergic to lisinopril..         Objective:  Patient's shoes and socks  removed.  Objective  Foot Exam     General  General Appearance: appears stated age and healthy   Orientation: alert and oriented to person, place, and time   Affect: appropriate   Gait: antalgic         Right Foot/Ankle      Inspection and Palpation  Swelling: dorsum   Arch: pes cavus  Hallux limitus: yes  Skin Exam: dry skin;      Neurovascular  Dorsalis pedis: 1+  Posterior tibial: 1+  Saphenous nerve sensation: diminished  Tibial nerve sensation: diminished  Superficial peroneal nerve sensation: diminished  Deep peroneal nerve sensation: diminished  Sural nerve sensation: diminished        Left Foot/Ankle       Inspection and Palpation  Tenderness: bony tenderness and calcaneus tenderness   Swelling: dorsum   Arch: pes cavus  Hallux limitus: yes  Skin Exam: dry skin;      Neurovascular  Dorsalis pedis: 1+  Posterior tibial: 1+  Saphenous nerve sensation: diminished  Tibial nerve sensation: diminished  Superficial peroneal nerve sensation: diminished  Deep peroneal nerve sensation: diminished  Sural nerve sensation: diminished        Physical Exam  Vitals and nursing note reviewed.   Constitutional:       Appearance: Normal appearance.   Cardiovascular:      Rate and Rhythm: Normal rate and regular rhythm.      Pulses: Pulses are weak.           Dorsalis pedis pulses are 1+ on the right side and 1+ on the left side.        Posterior tibial pulses are 1+ on the right side and 1+ on the left side.   Musculoskeletal:      Left foot: Bony tenderness present.   Feet:      Right foot:      Skin integrity: Dry skin present.      Left foot:      Skin integrity: Dry skin present.      Comments: Patient has 2 preulcerative lesions right hallux.  Distal aspect with callus plantar medial IPJ with intradermal dried hemorrhage.  Negative cellulitis.  Skin:     Capillary Refill: Capillary refill takes 2 to 3 seconds.   Psychiatric:         Mood and Affect: Mood normal.         Behavior: Behavior normal.         Thought Content:  Thought content normal.         Judgment: Judgment normal.      Patient's shoes and socks removed.     Right Foot/Ankle   Right Foot Inspection  Skin Exam: dry skin.      Toe Exam: tenderness and right toe deformity.      Sensory   Vibration: absent  Proprioception: diminished  Monofilament testing: diminished     Vascular  Capillary refills: < 3 seconds  The right DP pulse is 1+. The right PT pulse is 1+.      Right Toe  - Comprehensive Exam  Arch: pes cavus  Hallux limitus: yes  Swelling: dorsum         Left Foot/Ankle  Left Foot Inspection  Skin Exam: dry skin.      Toe Exam: tenderness and left toe deformity.      Sensory   Vibration: absent  Proprioception: diminished  Monofilament testing: diminished     Vascular  Capillary refills: < 3 seconds  The left DP pulse is 1+. The left PT pulse is 1+.      Left Toe  - Comprehensive Exam  Arch: pes cavus  Hallux limitus: yes  Swelling: dorsum   Tenderness: bony tenderness and calcaneus tenderness         Assign Risk Category  Deformity present  Loss of protective sensation  Weak pulses  Risk: 2

## 2025-04-23 NOTE — LETTER
Date: 04/23/25    Dear Thomas Horne,   My name is Whitley; I am a registered nurse care manager working with [facility] Care Management Department within [facility].   I have not been able to reach you and would like to set a time that I can talk with you over the phone.  My work is to help patients that have complex medical conditions get the care they need. This includes patients who may have been in the hospital or emergency room.    Please call me with any questions you may have. I look forward to speaking with you.    Sincerely,  Whitley DOUGHERTY, RN  [phone]  Outpatient Registered Nurse Care Manager

## 2025-04-24 ENCOUNTER — RESULTS FOLLOW-UP (OUTPATIENT)
Dept: CARDIOLOGY CLINIC | Facility: CLINIC | Age: 80
End: 2025-04-24

## 2025-04-24 ENCOUNTER — REMOTE DEVICE CLINIC VISIT (OUTPATIENT)
Dept: CARDIOLOGY CLINIC | Facility: CLINIC | Age: 80
End: 2025-04-24
Payer: COMMERCIAL

## 2025-04-24 DIAGNOSIS — K26.9 DUODENAL ULCER: ICD-10-CM

## 2025-04-24 DIAGNOSIS — Z95.0 PRESENCE OF PERMANENT CARDIAC PACEMAKER: Primary | ICD-10-CM

## 2025-04-24 DIAGNOSIS — I50.9 CHF (CONGESTIVE HEART FAILURE) (HCC): ICD-10-CM

## 2025-04-24 PROCEDURE — 93296 REM INTERROG EVL PM/IDS: CPT | Performed by: INTERNAL MEDICINE

## 2025-04-24 PROCEDURE — 93294 REM INTERROG EVL PM/LDLS PM: CPT | Performed by: INTERNAL MEDICINE

## 2025-04-24 NOTE — PROGRESS NOTES
Results for orders placed or performed in visit on 04/24/25   Cardiac EP device report    Narrative    MDT DUAL PM/ ACTIVE SYSTEM IS MRI CONDITIONAL  CARELINK TRANSMISSION: BATTERY VOLTAGE ADEQUATE. (8.3 YRS) %  99%. ALL AVAILABLE LEAD PARAMETERS WITHIN NORMAL LIMITS. NO SIGNIFICANT HIGH RATE EPISODES. SINGLE PVC COUNT 37.2/HOUR. NORMAL DEVICE FUNCTION.---YA

## 2025-04-25 RX ORDER — TORSEMIDE 20 MG/1
20 TABLET ORAL DAILY
Qty: 90 TABLET | Refills: 1 | Status: SHIPPED | OUTPATIENT
Start: 2025-04-25

## 2025-04-25 RX ORDER — PANTOPRAZOLE SODIUM 40 MG/1
40 TABLET, DELAYED RELEASE ORAL DAILY
Qty: 90 TABLET | Refills: 1 | Status: SHIPPED | OUTPATIENT
Start: 2025-04-25

## 2025-04-26 DIAGNOSIS — Z79.4 TYPE 2 DIABETES MELLITUS WITH DIABETIC PERIPHERAL ANGIOPATHY WITHOUT GANGRENE, WITH LONG-TERM CURRENT USE OF INSULIN (HCC): ICD-10-CM

## 2025-04-26 DIAGNOSIS — E11.51 TYPE 2 DIABETES MELLITUS WITH DIABETIC PERIPHERAL ANGIOPATHY WITHOUT GANGRENE, WITH LONG-TERM CURRENT USE OF INSULIN (HCC): ICD-10-CM

## 2025-04-28 ENCOUNTER — OFFICE VISIT (OUTPATIENT)
Dept: HEMATOLOGY ONCOLOGY | Facility: MEDICAL CENTER | Age: 80
End: 2025-04-28
Payer: COMMERCIAL

## 2025-04-28 VITALS
WEIGHT: 233 LBS | DIASTOLIC BLOOD PRESSURE: 84 MMHG | HEIGHT: 74 IN | OXYGEN SATURATION: 97 % | HEART RATE: 88 BPM | BODY MASS INDEX: 29.9 KG/M2 | RESPIRATION RATE: 18 BRPM | TEMPERATURE: 96.4 F | SYSTOLIC BLOOD PRESSURE: 144 MMHG

## 2025-04-28 DIAGNOSIS — K26.9 DUODENAL ULCER: ICD-10-CM

## 2025-04-28 DIAGNOSIS — D64.9 SYMPTOMATIC ANEMIA: Primary | ICD-10-CM

## 2025-04-28 DIAGNOSIS — Z86.718 HISTORY OF DVT (DEEP VEIN THROMBOSIS): ICD-10-CM

## 2025-04-28 DIAGNOSIS — N17.9 ACUTE KIDNEY INJURY SUPERIMPOSED ON CHRONIC KIDNEY DISEASE  (HCC): ICD-10-CM

## 2025-04-28 DIAGNOSIS — D50.0 IRON DEFICIENCY ANEMIA DUE TO CHRONIC BLOOD LOSS: ICD-10-CM

## 2025-04-28 DIAGNOSIS — N18.9 ACUTE KIDNEY INJURY SUPERIMPOSED ON CHRONIC KIDNEY DISEASE  (HCC): ICD-10-CM

## 2025-04-28 DIAGNOSIS — I50.32 CHRONIC HEART FAILURE WITH PRESERVED EJECTION FRACTION (HFPEF) (HCC): ICD-10-CM

## 2025-04-28 PROCEDURE — 99213 OFFICE O/P EST LOW 20 MIN: CPT | Performed by: PHYSICIAN ASSISTANT

## 2025-04-28 NOTE — ASSESSMENT & PLAN NOTE
Patient was seen in follow-up for anemia, likely secondary to chronic blood loss along with Stage III CKD.     Patient with iron deficiency anemia with history of GI bleeding. He had recent EGD (4/10/2024) and colonoscopy (2/14/2024) without evidence of bleeding.  Capsule endoscopy was recommended; however, he has not had any recent follow-up with GI.     He was treated with Venofer 300 mg weekly x 3 from 6/11/2024 through 6/25/2024.     He had repeat lab work on 4/14/2025.  WBC 8.53, hemoglobin 14.7, hematocrit 47.0, MCV 89, platelets 213.  No iron studies were drawn; however his hemoglobin has improved from before.  He will check iron studies with his next set of lab work.    Patient follows with nephrology for management of CKD.     In patients with iron deficiency along with chronic kidney disease we typically replete iron to keep ferritin greater than 100 and iron saturation greater than 20%.     Hemoglobin has improved significantly from prior.  He is currently taking oral iron 1 tablet every other day which he prefers to continue with for now.     Patient will be made PRN.  He says that he typically has lab work drawn by his PCP 3 or 4 times per year.  Iron studies should be followed.  Patient can be referred back to hematology if needed.

## 2025-04-28 NOTE — ASSESSMENT & PLAN NOTE
Continue oral iron every other day as currently taking.  Orders:    Iron Panel (Includes Ferritin, Iron Sat%, Iron, and TIBC); Future

## 2025-04-28 NOTE — PROGRESS NOTES
Name: Thomas Horne      : 1945      MRN: [id number]  Encounter Provider: Karma Melissa PA-C  Encounter Date: 2025   Encounter department: [facility]  :  Assessment & Plan  History of DVT (deep vein thrombosis)  Patient with recent finding of acute nonocclusive DVT in the gastrocnemius and peroneal veins in the left lower extremity. ?Provoked by hospital stay from  through 2024 for SOB.  He also has distant history of DVT.  Details are unclear.     He was placed on Coumadin on 2024.  This was being managed by his PCP. This was discontinued by his PCP in early 2024.     He is following with vascular.  He had repeat venous duplex on 2024.  This showed no evidence of acute or chronic DVT in either lower extremity.        Symptomatic anemia  Patient was seen in follow-up for anemia, likely secondary to chronic blood loss along with Stage III CKD.     Patient with iron deficiency anemia with history of GI bleeding. He had recent EGD (4/10/2024) and colonoscopy (2024) without evidence of bleeding.  Capsule endoscopy was recommended; however, he has not had any recent follow-up with GI.     He was treated with Venofer 300 mg weekly x 3 from 2024 through 2024.     He had repeat lab work on 2025.  WBC 8.53, hemoglobin 14.7, hematocrit 47.0, MCV 89, platelets 213.  No iron studies were drawn; however his hemoglobin has improved from before.  He will check iron studies with his next set of lab work.    Patient follows with nephrology for management of CKD.     In patients with iron deficiency along with chronic kidney disease we typically replete iron to keep ferritin greater than 100 and iron saturation greater than 20%.     Hemoglobin has improved significantly from prior.  He is currently taking oral iron 1 tablet every other day which he prefers to continue with for now.     Patient will be made PRN.  He says that he  typically has lab work drawn by his PCP 3 or 4 times per year.  Iron studies should be followed.  Patient can be referred back to hematology if needed.       Iron deficiency anemia due to chronic blood loss  Continue oral iron every other day as currently taking.  Orders:    Iron Panel (Includes Ferritin, Iron Sat%, Iron, and TIBC); Future    History of Present Illness   Chief Complaint   Patient presents with    Follow-up   Thomas Horne is a 80 y.o. male. Patient was evaluated in follow-up of anemia.     This was felt to be related to iron deficiency along with chronic kidney disease.     He was treated with Venofer 300 mg weekly x 3 from 6/11/2024 through 6/25/2024.     He says he noticed improvement in fatigue after receiving the IV iron.      He denies any obvious bleeding.  No evidence of GI bleeding.  He has not recently followed up with GI.    He is no longer following with wound care. He is tolerating oral iron every other day without GI issues.     He recently had pacemaker placed.  He says that he feels better after pacemaker placement.  He has more energy.  He denies shortness of breath.  He has good appetite.  No nausea, vomiting, change in bowel habits.     Review of Systems   Constitutional:  Positive for fatigue. Negative for activity change, appetite change and fever.   HENT:  Negative for nosebleeds.    Respiratory:  Negative for cough, choking and shortness of breath.    Cardiovascular:  Negative for chest pain, palpitations and leg swelling.   Gastrointestinal:  Negative for abdominal distention, abdominal pain, anal bleeding, blood in stool, constipation, diarrhea, nausea and vomiting.   Endocrine: Negative for cold intolerance.   Genitourinary:  Negative for hematuria.   Musculoskeletal:  Positive for arthralgias. Negative for myalgias.   Skin:  Negative for color change, pallor and rash.   Allergic/Immunologic: Negative for immunocompromised state.   Neurological:  Negative for headaches.  "  Hematological:  Negative for adenopathy. Does not bruise/bleed easily.   All other systems reviewed and are negative.     Objective   /84 (BP Location: Left arm, Patient Position: Sitting, Cuff Size: Adult)   Pulse 88   Temp (!) 96.4 °F (35.8 °C) (Temporal)   Resp 18   Ht 6' 2\" (1.88 m)   Wt 106 kg (233 lb)   SpO2 97%   BMI 29.92 kg/m²     Physical Exam  Constitutional:       General: He is not in acute distress.     Appearance: Normal appearance. He is well-developed.   HENT:      Head: Normocephalic and atraumatic.      Right Ear: External ear normal.      Left Ear: External ear normal.      Nose: Nose normal.   Eyes:      General: No scleral icterus.     Conjunctiva/sclera: Conjunctivae normal.   Cardiovascular:      Rate and Rhythm: Normal rate and regular rhythm.   Pulmonary:      Effort: Pulmonary effort is normal. No respiratory distress.      Breath sounds: Normal breath sounds.   Abdominal:      General: Abdomen is flat. There is no distension.      Palpations: Abdomen is soft.      Tenderness: There is no abdominal tenderness.   Musculoskeletal:      Right lower leg: No edema.      Left lower leg: No edema.   Skin:     Findings: No rash (on exposed skin.).   Neurological:      Mental Status: He is alert and oriented to person, place, and time.   Psychiatric:         Mood and Affect: Mood normal.         Thought Content: Thought content normal.         Judgment: Judgment normal.      Labs: I have reviewed the following labs:  Results for orders placed or performed during the hospital encounter of 04/14/25   CBC and differential   Result Value Ref Range    WBC 8.53 4.31 - 10.16 Thousand/uL    RBC 5.29 3.88 - 5.62 Million/uL    Hemoglobin 14.7 12.0 - 17.0 g/dL    Hematocrit 47.0 36.5 - 49.3 %    MCV 89 82 - 98 fL    MCH 27.8 26.8 - 34.3 pg    MCHC 31.3 (L) 31.4 - 37.4 g/dL    RDW 14.9 11.6 - 15.1 %    MPV 12.3 8.9 - 12.7 fL    Platelets 213 149 - 390 Thousands/uL    nRBC 0 /100 WBCs    " Segmented % 75 43 - 75 %    Immature Grans % 1 0 - 2 %    Lymphocytes % 16 14 - 44 %    Monocytes % 6 4 - 12 %    Eosinophils Relative 2 0 - 6 %    Basophils Relative 0 0 - 1 %    Absolute Neutrophils 6.44 1.85 - 7.62 Thousands/µL    Absolute Immature Grans 0.04 0.00 - 0.20 Thousand/uL    Absolute Lymphocytes 1.40 0.60 - 4.47 Thousands/µL    Absolute Monocytes 0.47 0.17 - 1.22 Thousand/µL    Eosinophils Absolute 0.15 0.00 - 0.61 Thousand/µL    Basophils Absolute 0.03 0.00 - 0.10 Thousands/µL   Basic metabolic panel   Result Value Ref Range    Sodium 142 135 - 147 mmol/L    Potassium 4.5 3.5 - 5.3 mmol/L    Chloride 104 96 - 108 mmol/L    CO2 26 21 - 32 mmol/L    ANION GAP 12 4 - 13 mmol/L    BUN 41 (H) 5 - 25 mg/dL    Creatinine 1.56 (H) 0.60 - 1.30 mg/dL    Glucose 152 (H) 65 - 140 mg/dL    Calcium 9.8 8.4 - 10.2 mg/dL    eGFR 40 ml/min/1.73sq m     *Note: Due to a large number of results and/or encounters for the requested time period, some results have not been displayed. A complete set of results can be found in Results Review.

## 2025-04-30 RX ORDER — METOPROLOL SUCCINATE 100 MG/1
100 TABLET, EXTENDED RELEASE ORAL DAILY
Qty: 90 TABLET | Refills: 1 | Status: SHIPPED | OUTPATIENT
Start: 2025-04-30

## 2025-05-07 ENCOUNTER — PATIENT OUTREACH (OUTPATIENT)
Dept: CASE MANAGEMENT | Facility: OTHER | Age: 80
End: 2025-05-07

## 2025-05-07 NOTE — PROGRESS NOTES
Outpatient Care Management note- Unable to reach    Chart review completed    Outreach to the patient x 2 via phone on 4/16/25 and 4/23/25 with no response. An unable to reach letter was sent sent via [employer].     Will close referral at this time.

## 2025-05-08 ENCOUNTER — OFFICE VISIT (OUTPATIENT)
Dept: OTOLARYNGOLOGY | Facility: CLINIC | Age: 80
End: 2025-05-08
Payer: COMMERCIAL

## 2025-05-08 VITALS
HEIGHT: 74 IN | BODY MASS INDEX: 28.8 KG/M2 | HEART RATE: 83 BPM | WEIGHT: 224.4 LBS | TEMPERATURE: 97.4 F | OXYGEN SATURATION: 99 %

## 2025-05-08 DIAGNOSIS — Z92.89 HISTORY OF HYPERBARIC OXYGEN THERAPY: ICD-10-CM

## 2025-05-08 DIAGNOSIS — Z96.22 HISTORY OF TYMPANOSTOMY TUBE PLACEMENT: Primary | ICD-10-CM

## 2025-05-08 PROCEDURE — 99213 OFFICE O/P EST LOW 20 MIN: CPT | Performed by: OTOLARYNGOLOGY

## 2025-05-08 NOTE — PROGRESS NOTES
"Assessment/Plan:  Both PE tube starting to extrude.   F/u in 6 months.      Diagnosis ICD-10-CM Associated Orders   1. History of tympanostomy tube placement  Z96.22       2. History of hyperbaric oxygen therapy  Z92.89              Subjective:      Patient ID: Thomas Horne is a 80 y.o. male.    F/u for BMT.  No c/o.  Pt is finished with HBO.        The following portions of the patient's history were reviewed and updated as appropriate: allergies, current medications, past family history, past medical history, past social history, past surgical history and problem list.    Review of Systems      Objective:      Pulse 83   Temp (!) 97.4 °F (36.3 °C) (Temporal)   Ht 6' 2\" (1.88 m)   Wt 102 kg (224 lb 6.4 oz)   SpO2 99%   BMI 28.81 kg/m²          Physical Exam  Constitutional:       Appearance: He is well-developed.   HENT:      Head: Normocephalic and atraumatic.      Right Ear: Tympanic membrane, ear canal and external ear normal. No drainage. No middle ear effusion. A PE tube (starting to extrude) is present.      Left Ear: Tympanic membrane, ear canal and external ear normal. No drainage.  No middle ear effusion. A PE tube (starting to extrude) is present.      Nose: Nose normal.      Mouth/Throat:      Pharynx: Uvula midline. No oropharyngeal exudate.      Tonsils: 2+ on the right. 2+ on the left.   Neck:      Thyroid: No thyroid mass or thyromegaly.      Trachea: Trachea normal. No tracheal deviation.   Lymphadenopathy:      Cervical: No cervical adenopathy.   Neurological:      Mental Status: He is alert.         "

## 2025-05-09 ENCOUNTER — TELEPHONE (OUTPATIENT)
Age: 80
End: 2025-05-09

## 2025-05-13 ENCOUNTER — HOSPITAL ENCOUNTER (OUTPATIENT)
Dept: RADIOLOGY | Facility: HOSPITAL | Age: 80
Discharge: HOME | DRG: 312 | End: 2025-05-13
Attending: SURGERY
Payer: COMMERCIAL

## 2025-05-13 DIAGNOSIS — I73.9 PERIPHERAL ARTERY DISEASE (HCC): ICD-10-CM

## 2025-05-13 PROCEDURE — 93922 UPR/L XTREMITY ART 2 LEVELS: CPT | Performed by: SURGERY

## 2025-05-13 PROCEDURE — 93925 LOWER EXTREMITY STUDY: CPT | Performed by: SURGERY

## 2025-05-13 PROCEDURE — 93925 LOWER EXTREMITY STUDY: CPT

## 2025-05-13 PROCEDURE — 93923 UPR/LXTR ART STDY 3+ LVLS: CPT

## 2025-05-14 ENCOUNTER — APPOINTMENT (OUTPATIENT)
Dept: LAB | Facility: HOSPITAL | Age: 80
DRG: 312 | End: 2025-05-14
Attending: INTERNAL MEDICINE
Payer: COMMERCIAL

## 2025-05-14 ENCOUNTER — TELEPHONE (OUTPATIENT)
Age: 80
End: 2025-05-14

## 2025-05-14 DIAGNOSIS — M89.9 CHRONIC KIDNEY DISEASE-MINERAL AND BONE DISORDER: ICD-10-CM

## 2025-05-14 DIAGNOSIS — N18.9 CHRONIC KIDNEY DISEASE-MINERAL AND BONE DISORDER: ICD-10-CM

## 2025-05-14 DIAGNOSIS — D50.0 IRON DEFICIENCY ANEMIA DUE TO CHRONIC BLOOD LOSS: ICD-10-CM

## 2025-05-14 DIAGNOSIS — E83.9 CHRONIC KIDNEY DISEASE-MINERAL AND BONE DISORDER: ICD-10-CM

## 2025-05-14 DIAGNOSIS — N18.32 STAGE 3B CHRONIC KIDNEY DISEASE (HCC): ICD-10-CM

## 2025-05-14 DIAGNOSIS — D50.9 IRON DEFICIENCY ANEMIA, UNSPECIFIED IRON DEFICIENCY ANEMIA TYPE: ICD-10-CM

## 2025-05-14 LAB
25(OH)D3 SERPL-MCNC: 45.9 NG/ML (ref 30–100)
ALBUMIN SERPL BCG-MCNC: 4.6 G/DL (ref 3.5–5)
ANION GAP SERPL CALCULATED.3IONS-SCNC: 13 MMOL/L (ref 4–13)
BUN SERPL-MCNC: 38 MG/DL (ref 5–25)
CALCIUM SERPL-MCNC: 9.9 MG/DL (ref 8.4–10.2)
CHLORIDE SERPL-SCNC: 101 MMOL/L (ref 96–108)
CO2 SERPL-SCNC: 27 MMOL/L (ref 21–32)
CREAT SERPL-MCNC: 1.58 MG/DL (ref 0.6–1.3)
CREAT UR-MCNC: 25.2 MG/DL
ERYTHROCYTE [DISTWIDTH] IN BLOOD BY AUTOMATED COUNT: 15.3 % (ref 11.6–15.1)
FERRITIN SERPL-MCNC: 48 NG/ML (ref 30–336)
GFR SERPL CREATININE-BSD FRML MDRD: 40 ML/MIN/1.73SQ M
GLUCOSE SERPL-MCNC: 119 MG/DL (ref 65–140)
HCT VFR BLD AUTO: 47 % (ref 36.5–49.3)
HGB BLD-MCNC: 14.6 G/DL (ref 12–17)
IRON SATN MFR SERPL: 10 % (ref 15–50)
IRON SERPL-MCNC: 45 UG/DL (ref 50–212)
MAGNESIUM SERPL-MCNC: 2 MG/DL (ref 1.9–2.7)
MCH RBC QN AUTO: 27.7 PG (ref 26.8–34.3)
MCHC RBC AUTO-ENTMCNC: 31.1 G/DL (ref 31.4–37.4)
MCV RBC AUTO: 89 FL (ref 82–98)
MICROALBUMIN UR-MCNC: <7 MG/L
PHOSPHATE SERPL-MCNC: 4 MG/DL (ref 2.3–4.1)
PLATELET # BLD AUTO: 225 THOUSANDS/UL (ref 149–390)
PMV BLD AUTO: 12.1 FL (ref 8.9–12.7)
POTASSIUM SERPL-SCNC: 4.2 MMOL/L (ref 3.5–5.3)
PTH-INTACT SERPL-MCNC: 56.8 PG/ML (ref 12–88)
RBC # BLD AUTO: 5.28 MILLION/UL (ref 3.88–5.62)
SODIUM SERPL-SCNC: 141 MMOL/L (ref 135–147)
TIBC SERPL-MCNC: 436.8 UG/DL (ref 250–450)
TRANSFERRIN SERPL-MCNC: 312 MG/DL (ref 203–362)
UIBC SERPL-MCNC: 392 UG/DL (ref 155–355)
WBC # BLD AUTO: 7.68 THOUSAND/UL (ref 4.31–10.16)

## 2025-05-14 PROCEDURE — 83735 ASSAY OF MAGNESIUM: CPT

## 2025-05-14 PROCEDURE — 83540 ASSAY OF IRON: CPT

## 2025-05-14 PROCEDURE — 83970 ASSAY OF PARATHORMONE: CPT

## 2025-05-14 PROCEDURE — 82570 ASSAY OF URINE CREATININE: CPT

## 2025-05-14 PROCEDURE — 83550 IRON BINDING TEST: CPT

## 2025-05-14 PROCEDURE — 80069 RENAL FUNCTION PANEL: CPT

## 2025-05-14 PROCEDURE — 82306 VITAMIN D 25 HYDROXY: CPT

## 2025-05-14 PROCEDURE — 36415 COLL VENOUS BLD VENIPUNCTURE: CPT

## 2025-05-14 PROCEDURE — 82043 UR ALBUMIN QUANTITATIVE: CPT

## 2025-05-14 PROCEDURE — 85027 COMPLETE CBC AUTOMATED: CPT

## 2025-05-14 PROCEDURE — 82728 ASSAY OF FERRITIN: CPT

## 2025-05-14 NOTE — TELEPHONE ENCOUNTER
Patient called on the line with [employer] insurance. The insurance rep needed to verify that he has a BMP done in january. Confirmed that he did.  Patient pushed his appointment for tomorrow from 9:30 AM to 2:30 PM so he can get his labs done.  No further action needed

## 2025-05-14 NOTE — PROGRESS NOTES
You were seen today for continued name: Thomas Horne      : 1945      MRN: [id number]  Encounter Provider: APOLLO Clark  Encounter Date: 5/15/2025   Encounter department: [facility]  :  Assessment & Plan  Stage 3b chronic kidney disease (HCC)  Lab Results   Component Value Date    EGFR 40 2025    EGFR 40 2025    EGFR 47 02/15/2025    CREATININE 1.58 (H) 2025    CREATININE 1.56 (H) 2025    CREATININE 1.37 (H) 02/15/2025   CKD stage IIIb without proteinuria  Follows with Dr. Fischer  Baseline creatinine 1.6 to 1.8 mg/dL  Current creatinine 1.58  Recent CT with contrast 2025.  Patient seen in the emergency room recently he fell at home--syncopal event?.  Plavix stopped  Renal function stable following contrast  UACR unable to calculate  Prior urinalysis bland  Contrast CT of the abdomen and pelvis 2025: No hydronephrosis.  Simple renal cyst.  Nonobstructing calculus right lower pole measuring 4 mm.  Vascular calcifications.  Bilateral renal scarring.  Status post prostatectomy  Etiology is multifactorial due to hypertensive nephrosclerosis, diabetic kidney disease and cardiorenal syndrome  Management: Control modifiable risk factors  Follow-up in 4 to 6 months    Orders:    Albumin / creatinine urine ratio; Future    CBC; Future    PTH, intact; Future    Renal function panel; Future    Urinalysis with microscopic; Future    Vitamin D 25 hydroxy; Future    Magnesium; Future    Benign hypertension with chronic kidney disease  Current medication: Metoprolol, isosorbide mononitrate, Ranexa  Generally blood pressure readings in the 120s.  Today blood pressure in the 80s initially asymptomatic becoming symptomatic.  Heart rhythm was regular.  Rate acceptable  Last office visit in 2025 amlodipine was discontinued patient remained off of losartan since blood pressure was below goal  At last office visit blood pressure reading 102/58.   Weight recorded as 222 pounds  Current weight 226 pounds.   Volume status: No edema.  Patient states that he needs the daily dose of diuretic if he does not take it he begins to accumulate edema and becomes short of breath  Recently seen in the emergency room due to fall at home.  Patient fell and hit his head on the floor.  He believes he passed out briefly.   Plavix was discontinued  At office visit today blood pressure initially in the 80s systolic.  I examined Thomas and retook the blood pressure which was basically equal on both arms both being low and now in the 70s.  He began becoming symptomatic and stating he was seeing black dots in front of his eyes.  Blood sugar was checked which was 143.  Patient was given a glass of juice.  Symptoms persisted and blood pressure was not rebounding.  No chest pain or shortness of breath  EMS called and Thomas was transported to the emergency room for further evaluation  Messaged patient's cardiologist, Dr. Pereira regarding the above    Orders:    Albumin / creatinine urine ratio; Future    CBC; Future    PTH, intact; Future    Renal function panel; Future    Urinalysis with microscopic; Future    Vitamin D 25 hydroxy; Future    Magnesium; Future    Anemia of chronic kidney failure, unspecified stage  Noted on iron tablet every other day at last office visit in January  Remains iron deficient: Iron saturation 10%, ferritin 48  Current hemoglobin normal 14.6  Has follow-up with hematology  Orders:    Albumin / creatinine urine ratio; Future    CBC; Future    PTH, intact; Future    Renal function panel; Future    Urinalysis with microscopic; Future    Vitamin D 25 hydroxy; Future    Magnesium; Future    Iron deficiency anemia, unspecified iron deficiency anemia type  Started on iron tablet last office visit in January  Follow-up iron studies: Remains iron deficient with iron saturation 10% and ferritin 48.  Orders:    Albumin / creatinine urine ratio; Future    CBC; Future     PTH, intact; Future    Renal function panel; Future    Urinalysis with microscopic; Future    Vitamin D 25 hydroxy; Future    Magnesium; Future    Chronic diastolic congestive heart failure (HCC)  Wt Readings from Last 3 Encounters:   05/15/25 103 kg (226 lb)   05/08/25 102 kg (224 lb 6.4 oz)   04/28/25 106 kg (233 lb)   Recent echocardiogram ejection fraction 50%.  Moderate to severe aortic stenosis  No evidence of decompensation  Appears euvolemic            Orders:    Albumin / creatinine urine ratio; Future    CBC; Future    PTH, intact; Future    Renal function panel; Future    Urinalysis with microscopic; Future    Vitamin D 25 hydroxy; Future    Magnesium; Future    Coronary artery disease involving native coronary artery of native heart without angina pectoris  Status post CABG  Orders:    Albumin / creatinine urine ratio; Future    CBC; Future    PTH, intact; Future    Renal function panel; Future    Urinalysis with microscopic; Future    Vitamin D 25 hydroxy; Future    Magnesium; Future    Type 2 diabetes mellitus with diabetic polyneuropathy, with long-term current use of insulin ([facility])    Lab Results   Component Value Date    HGBA1C 6.4 (H) 02/15/2025     On Lantus insulin, Januvia and metformin along with Jardiance  Orders:    Albumin / creatinine urine ratio; Future    CBC; Future    PTH, intact; Future    Renal function panel; Future    Urinalysis with microscopic; Future    Vitamin D 25 hydroxy; Future    Magnesium; Future    Chronic kidney disease-mineral and bone disorder (CKD-MBD)  Vitamin D level normal 45.9  PTH normal 56.8  Orders:    Albumin / creatinine urine ratio; Future    CBC; Future    PTH, intact; Future    Renal function panel; Future    Urinalysis with microscopic; Future    Vitamin D 25 hydroxy; Future    Magnesium; Future    Abnormal CT scan  CT with contrast April 2025: Left lower lobe solid nodule measuring 1.5 cm, stable from 9/6/2024, previously 0.8 cm on 8/2/2022. Recommend  PET/CT and/or tissue sampling if not already performed.   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  Follow-up in 1 year recommended  Orders:    Albumin / creatinine urine ratio; Future    CBC; Future    PTH, intact; Future    Renal function panel; Future    Urinalysis with microscopic; Future    Vitamin D 25 hydroxy; Future    Magnesium; Future        Patient Instructions   Thank you for the kind visit today.  You were seen today for continued monitoring of chronic kidney disease stage III.  At this time kidney function is stable.    Recommendations:  Follow-up in approximately 4 to 6 months  Blood work and urine studies prior to the appointment  Recommend ER evaluation due to hypotension.  EMS called and patient taken to the emergency room.      History of Present Illness   HPI  Thomas Horne is a 80 y.o. male who presents for follow-up.  Patient follows with Dr. Servin for management of CKD stage III.  He was last seen in the nephrology clinic January 2025.  Renal function was noted to be stable.  Blood pressure medications were adjusted since blood pressure was low-amlodipine was discontinued and he remained off losartan.  Volume status was acceptable.  History obtained from: patient    Review of Systems   Constitutional:  Negative for activity change, appetite change, chills, fever and unexpected weight change.   HENT:  Negative for congestion and sore throat.    Eyes:  Negative for pain and visual disturbance.   Respiratory:  Negative for cough and shortness of breath.    Cardiovascular:  Negative for chest pain, palpitations and leg swelling.   Gastrointestinal:  Negative for abdominal pain, blood in stool, constipation, diarrhea, nausea and vomiting.   Genitourinary:  Negative for dysuria and hematuria.   Musculoskeletal:  Negative for  "arthralgias and back pain.   Skin:  Negative for color change and rash.   Neurological:  Negative for dizziness, seizures, syncope (Patient believes he passed out last month when he was seen in the emergency room.  He was going to the bathroom at night and collapsed) and light-headedness.   Hematological:  Does not bruise/bleed easily.   Psychiatric/Behavioral:  The patient is not nervous/anxious.    All other systems reviewed and are negative.    Medical History Reviewed by provider this encounter:  Meds     .       Medications Ordered Prior to Encounter[1]  Objective   BP (!) 80/52 (BP Location: Left arm, Patient Position: Sitting, Cuff Size: Large)   Pulse 74   Ht 6' 2\" (1.88 m)   Wt 103 kg (226 lb)   SpO2 99%   BMI 29.02 kg/m²      Physical Exam  Vitals and nursing note reviewed.   Constitutional:       General: He is not in acute distress.     Appearance: Normal appearance. He is well-developed. He is not ill-appearing, toxic-appearing or diaphoretic.   HENT:      Head: Normocephalic and atraumatic.      Nose: Nose normal. No congestion.      Mouth/Throat:      Mouth: Mucous membranes are moist.     Eyes:      General: No scleral icterus.        Right eye: No discharge.         Left eye: No discharge.      Extraocular Movements: Extraocular movements intact.      Conjunctiva/sclera: Conjunctivae normal.     Neck:      Vascular: No carotid bruit or JVD.     Cardiovascular:      Rate and Rhythm: Normal rate and regular rhythm.      Heart sounds: No murmur heard.     No friction rub. No gallop.   Pulmonary:      Effort: Pulmonary effort is normal. No respiratory distress.      Breath sounds: Normal breath sounds.   Abdominal:      General: Bowel sounds are normal. There is no distension.      Palpations: Abdomen is soft.      Tenderness: There is no abdominal tenderness.     Musculoskeletal:         General: No swelling, tenderness or signs of injury. Normal range of motion.      Cervical back: Normal range " of motion and neck supple. No rigidity or tenderness.      Right lower leg: No edema.      Left lower leg: No edema.     Skin:     General: Skin is warm and dry.      Capillary Refill: Capillary refill takes less than 2 seconds.      Coloration: Skin is not jaundiced or pale.     Neurological:      Mental Status: He is alert and oriented to person, place, and time.     Psychiatric:         Mood and Affect: Mood normal.         Behavior: Behavior normal.         Thought Content: Thought content normal.         Judgment: Judgment normal.           Laboratory Results:  Results from last 7 days   Lab Units 05/15/25  1651 05/14/25  1625   WBC Thousand/uL 6.80 7.68   HEMOGLOBIN g/dL 13.4 14.6   HEMATOCRIT % 43.4 47.0   PLATELETS Thousands/uL 216 225   POTASSIUM mmol/L  --  4.2   CHLORIDE mmol/L  --  101   CO2 mmol/L  --  27   BUN mg/dL  --  38*   CREATININE mg/dL  --  1.58*   CALCIUM mg/dL  --  9.9   MAGNESIUM mg/dL  --  2.0   PHOSPHORUS mg/dL  --  4.0       Results for orders placed or performed in visit on 05/14/25   CBC   Result Value Ref Range    WBC 7.68 4.31 - 10.16 Thousand/uL    RBC 5.28 3.88 - 5.62 Million/uL    Hemoglobin 14.6 12.0 - 17.0 g/dL    Hematocrit 47.0 36.5 - 49.3 %    MCV 89 82 - 98 fL    MCH 27.7 26.8 - 34.3 pg    MCHC 31.1 (L) 31.4 - 37.4 g/dL    RDW 15.3 (H) 11.6 - 15.1 %    Platelets 225 149 - 390 Thousands/uL    MPV 12.1 8.9 - 12.7 fL   Magnesium   Result Value Ref Range    Magnesium 2.0 1.9 - 2.7 mg/dL   PTH, intact   Result Value Ref Range    PTH 56.8 12.0 - 88.0 pg/mL   Renal function panel   Result Value Ref Range    Albumin 4.6 3.5 - 5.0 g/dL    Calcium 9.9 8.4 - 10.2 mg/dL    Phosphorus 4.0 2.3 - 4.1 mg/dL    Glucose 119 65 - 140 mg/dL    BUN 38 (H) 5 - 25 mg/dL    Creatinine 1.58 (H) 0.60 - 1.30 mg/dL    Sodium 141 135 - 147 mmol/L    Potassium 4.2 3.5 - 5.3 mmol/L    Chloride 101 96 - 108 mmol/L    CO2 27 21 - 32 mmol/L    ANION GAP 13 4 - 13 mmol/L    eGFR 40 ml/min/1.73sq m   Vitamin D  25 hydroxy   Result Value Ref Range    Vit D, 25-Hydroxy 45.9 30.0 - 100.0 ng/mL   Albumin / creatinine urine ratio   Result Value Ref Range    Creatinine, Ur 25.2 Reference range not established. mg/dL    Albumin,U,Random <7.0 <20.0 mg/L    Albumin Creat Ratio     TIBC Panel (incl. Iron, TIBC, % Iron Saturation)   Result Value Ref Range    Iron Saturation 10 (L) 15 - 50 %    TIBC 436.8 250 - 450 ug/dL    Iron 45 (L) 50 - 212 ug/dL    Transferrin 312 203 - 362 mg/dL    UIBC 392 (H) 155 - 355 ug/dL   Ferritin   Result Value Ref Range    Ferritin 48 30 - 336 ng/mL     *Note: Due to a large number of results and/or encounters for the requested time period, some results have not been displayed. A complete set of results can be found in Results Review.                [1]   Current Outpatient Medications on File Prior to Visit   Medication Sig Dispense Refill    acetaminophen (TYLENOL) 325 mg tablet Take 2 tablets (650 mg total) by mouth every 6 (six) hours as needed for mild pain      atorvastatin (LIPITOR) 40 mg tablet Take 1 tablet (40 mg total) by mouth daily with dinner 90 tablet 1    clopidogrel (PLAVIX) 75 mg tablet Take 1 tablet (75 mg total) by mouth daily 30 tablet 5    cyclobenzaprine (FLEXERIL) 10 mg tablet Take 0.5 tablets (5 mg total) by mouth 2 (two) times a day as needed for muscle spasms for up to 5 days 10 tablet 0    DULoxetine (CYMBALTA) 30 mg delayed release capsule Take 1 capsule (30 mg total) by mouth daily 90 capsule 0    Empagliflozin (Jardiance) 25 MG TABS TAKE 1 TABLET BY MOUTH DAILY 90 tablet 1    famotidine (PEPCID) 40 MG tablet Take 1 tablet (40 mg total) by mouth daily at bedtime 90 tablet 1    fenofibrate 160 MG tablet TAKE 1 TABLET BY MOUTH DAILY 90 tablet 1    ferrous sulfate 324 (65 Fe) mg Take 1 tablet (324 mg total) by mouth every other day      insulin glargine (LANTUS) 100 units/mL subcutaneous injection Inject 15 Units under the skin daily at bedtime 13.5 mL 3    isosorbide  mononitrate (IMDUR) 30 mg 24 hr tablet Take 3 tablets (90 mg total) by mouth daily 270 tablet 1    Januvia 100 MG tablet TAKE ONE TABLET BY MOUTH DAILY 90 tablet 3    metFORMIN (GLUCOPHAGE) 500 mg tablet TAKE 1 TABLET BY MOUTH TWICE  DAILY WITH MEALS 180 tablet 1    metoprolol succinate (TOPROL-XL) 100 mg 24 hr tablet Take 1 tablet (100 mg total) by mouth daily 90 tablet 1    Multiple Vitamins-Minerals (MULTIVITAMIN MEN 50+ PO) Take by mouth in the morning.      Omega-3 Fatty Acids (fish oil) 1,000 mg Take 4,000 mg by mouth in the morning and 4,000 mg in the evening.      oxyCODONE-acetaminophen (PERCOCET) 5-325 mg per tablet Take 1 tablet by mouth as needed in the morning and 1 tablet as needed in the evening for moderate pain or severe pain.      pantoprazole (PROTONIX) 40 mg tablet TAKE 1 TABLET BY MOUTH DAILY 90 tablet 1    pentoxifylline (TRENtal) 400 mg ER tablet TAKE 1 TABLET BY MOUTH 3 TIMES  DAILY WITH MEALS 270 tablet 3    pregabalin (LYRICA) 150 mg capsule Take 1 capsule (150 mg total) by mouth 3 (three) times a day 270 capsule 0    ranolazine (RANEXA) 1000 MG SR tablet Take 1 tablet (1,000 mg total) by mouth 2 (two) times a day 180 tablet 3    torsemide (DEMADEX) 20 mg tablet TAKE 1 TABLET BY MOUTH DAILY 90 tablet 1    Blood Glucose Monitoring Suppl (OneTouch Verio Reflect) w/Device KIT Check blood sugars twice daily. Please substitute with appropriate alternative as covered by patient's insurance. Dx: E11.65 1 kit 0    glucose blood (OneTouch Verio) test strip TEST TWICE A  strip 5    lidocaine (LIDODERM) 5 % Apply 1 patch topically over 12 hours daily for 2 days Remove & Discard patch within 12 hours or as directed by MD 2 patch 0    nitroglycerin (NITROSTAT) 0.4 mg SL tablet Place 1 tablet (0.4 mg total) under the tongue every 5 (five) minutes as needed for chest pain (Patient not taking: Reported on 1/15/2025) 30 tablet 0    ondansetron (ZOFRAN) 4 mg tablet Take 1 tablet (4 mg total) by mouth  every 12 (twelve) hours as needed for nausea or vomiting 30 tablet 0    OneTouch Delica Lancets 33G MISC Check blood sugars twice daily. Please substitute with appropriate alternative as covered by patient's insurance. Dx: E11.65 200 each 3    Sure Comfort Pen Needles 32G X 4 MM MISC TAKE ONE TABLET BY MOUTH DAILY 100 each 5    [DISCONTINUED] sitaGLIPtin (JANUVIA) 25 mg tablet Take 1 tablet (25 mg total) by mouth daily (Patient not taking: Reported on 4/28/2025) 30 tablet 0     No current facility-administered medications on file prior to visit.

## 2025-05-15 ENCOUNTER — TELEPHONE (OUTPATIENT)
Dept: CARDIOLOGY CLINIC | Facility: CLINIC | Age: 80
End: 2025-05-15

## 2025-05-15 ENCOUNTER — HOSPITAL ENCOUNTER (INPATIENT)
Facility: HOSPITAL | Age: 80
LOS: 1 days | Discharge: HOME | DRG: 312 | End: 2025-05-17
Attending: STUDENT IN AN ORGANIZED HEALTH CARE EDUCATION/TRAINING PROGRAM | Admitting: FAMILY MEDICINE
Payer: COMMERCIAL

## 2025-05-15 ENCOUNTER — APPOINTMENT (EMERGENCY)
Dept: RADIOLOGY | Facility: HOSPITAL | Age: 80
DRG: 312 | End: 2025-05-15
Payer: COMMERCIAL

## 2025-05-15 ENCOUNTER — OFFICE VISIT (OUTPATIENT)
Dept: NEPHROLOGY | Facility: CLINIC | Age: 80
End: 2025-05-15
Payer: COMMERCIAL

## 2025-05-15 VITALS
HEART RATE: 74 BPM | BODY MASS INDEX: 29 KG/M2 | WEIGHT: 226 LBS | DIASTOLIC BLOOD PRESSURE: 52 MMHG | HEIGHT: 74 IN | OXYGEN SATURATION: 99 % | SYSTOLIC BLOOD PRESSURE: 80 MMHG

## 2025-05-15 DIAGNOSIS — R93.89 ABNORMAL CT SCAN: ICD-10-CM

## 2025-05-15 DIAGNOSIS — I35.0 SEVERE AORTIC VALVE STENOSIS: ICD-10-CM

## 2025-05-15 DIAGNOSIS — N18.9 ANEMIA OF CHRONIC KIDNEY FAILURE, UNSPECIFIED STAGE: ICD-10-CM

## 2025-05-15 DIAGNOSIS — I12.9 BENIGN HYPERTENSION WITH CHRONIC KIDNEY DISEASE: ICD-10-CM

## 2025-05-15 DIAGNOSIS — M89.9 CHRONIC KIDNEY DISEASE-MINERAL AND BONE DISORDER (CKD-MBD): ICD-10-CM

## 2025-05-15 DIAGNOSIS — D63.1 ANEMIA OF CHRONIC KIDNEY FAILURE, UNSPECIFIED STAGE: ICD-10-CM

## 2025-05-15 DIAGNOSIS — I95.9 HYPOTENSION: Primary | ICD-10-CM

## 2025-05-15 DIAGNOSIS — I25.10 CORONARY ARTERY DISEASE INVOLVING NATIVE CORONARY ARTERY OF NATIVE HEART WITHOUT ANGINA PECTORIS: ICD-10-CM

## 2025-05-15 DIAGNOSIS — Z79.4 TYPE 2 DIABETES MELLITUS WITH DIABETIC POLYNEUROPATHY, WITH LONG-TERM CURRENT USE OF INSULIN (HCC): ICD-10-CM

## 2025-05-15 DIAGNOSIS — E83.9 CHRONIC KIDNEY DISEASE-MINERAL AND BONE DISORDER (CKD-MBD): ICD-10-CM

## 2025-05-15 DIAGNOSIS — E11.42 TYPE 2 DIABETES MELLITUS WITH DIABETIC POLYNEUROPATHY, WITH LONG-TERM CURRENT USE OF INSULIN (HCC): ICD-10-CM

## 2025-05-15 DIAGNOSIS — I50.32 CHRONIC DIASTOLIC CONGESTIVE HEART FAILURE (HCC): ICD-10-CM

## 2025-05-15 DIAGNOSIS — D50.9 IRON DEFICIENCY ANEMIA, UNSPECIFIED IRON DEFICIENCY ANEMIA TYPE: ICD-10-CM

## 2025-05-15 DIAGNOSIS — R07.9 CHEST PAIN, UNSPECIFIED TYPE: ICD-10-CM

## 2025-05-15 DIAGNOSIS — N18.9 CHRONIC KIDNEY DISEASE-MINERAL AND BONE DISORDER (CKD-MBD): ICD-10-CM

## 2025-05-15 DIAGNOSIS — N18.32 STAGE 3B CHRONIC KIDNEY DISEASE (HCC): Primary | ICD-10-CM

## 2025-05-15 DIAGNOSIS — I95.1 ORTHOSTATIC HYPOTENSION: ICD-10-CM

## 2025-05-15 PROBLEM — N17.9 ACUTE KIDNEY INJURY SUPERIMPOSED ON CHRONIC KIDNEY DISEASE  (HCC): Status: RESOLVED | Noted: 2022-02-16 | Resolved: 2025-05-15

## 2025-05-15 LAB
ALBUMIN SERPL BCG-MCNC: 4.2 G/DL (ref 3.5–5)
ALP SERPL-CCNC: 39 U/L (ref 34–104)
ALT SERPL W P-5'-P-CCNC: 19 U/L (ref 7–52)
ANION GAP SERPL CALCULATED.3IONS-SCNC: 12 MMOL/L (ref 4–13)
APTT PPP: 27 SECONDS (ref 23–34)
AST SERPL W P-5'-P-CCNC: 37 U/L (ref 13–39)
BASOPHILS # BLD AUTO: 0.03 THOUSANDS/ÂΜL (ref 0–0.1)
BASOPHILS NFR BLD AUTO: 0 % (ref 0–1)
BILIRUB SERPL-MCNC: 0.57 MG/DL (ref 0.2–1)
BUN SERPL-MCNC: 43 MG/DL (ref 5–25)
CALCIUM SERPL-MCNC: 9.3 MG/DL (ref 8.4–10.2)
CHLORIDE SERPL-SCNC: 101 MMOL/L (ref 96–108)
CO2 SERPL-SCNC: 24 MMOL/L (ref 21–32)
CREAT SERPL-MCNC: 1.81 MG/DL (ref 0.6–1.3)
EOSINOPHIL # BLD AUTO: 0.13 THOUSAND/ÂΜL (ref 0–0.61)
EOSINOPHIL NFR BLD AUTO: 2 % (ref 0–6)
ERYTHROCYTE [DISTWIDTH] IN BLOOD BY AUTOMATED COUNT: 15.3 % (ref 11.6–15.1)
GFR SERPL CREATININE-BSD FRML MDRD: 34 ML/MIN/1.73SQ M
GLUCOSE SERPL-MCNC: 130 MG/DL (ref 65–140)
GLUCOSE SERPL-MCNC: 217 MG/DL (ref 65–140)
HCT VFR BLD AUTO: 43.4 % (ref 36.5–49.3)
HGB BLD-MCNC: 13.4 G/DL (ref 12–17)
IMM GRANULOCYTES # BLD AUTO: 0.02 THOUSAND/UL (ref 0–0.2)
IMM GRANULOCYTES NFR BLD AUTO: 0 % (ref 0–2)
INR PPP: 1.07 (ref 0.85–1.19)
LACTATE SERPL-SCNC: 1.6 MMOL/L (ref 0.5–2)
LYMPHOCYTES # BLD AUTO: 1.51 THOUSANDS/ÂΜL (ref 0.6–4.47)
LYMPHOCYTES NFR BLD AUTO: 22 % (ref 14–44)
MCH RBC QN AUTO: 27.5 PG (ref 26.8–34.3)
MCHC RBC AUTO-ENTMCNC: 30.9 G/DL (ref 31.4–37.4)
MCV RBC AUTO: 89 FL (ref 82–98)
MONOCYTES # BLD AUTO: 0.51 THOUSAND/ÂΜL (ref 0.17–1.22)
MONOCYTES NFR BLD AUTO: 8 % (ref 4–12)
NEUTROPHILS # BLD AUTO: 4.6 THOUSANDS/ÂΜL (ref 1.85–7.62)
NEUTS SEG NFR BLD AUTO: 68 % (ref 43–75)
NRBC BLD AUTO-RTO: 0 /100 WBCS
PLATELET # BLD AUTO: 216 THOUSANDS/UL (ref 149–390)
PMV BLD AUTO: 12 FL (ref 8.9–12.7)
POTASSIUM SERPL-SCNC: 5.2 MMOL/L (ref 3.5–5.3)
PROCALCITONIN SERPL-MCNC: 0.07 NG/ML
PROT SERPL-MCNC: 7.8 G/DL (ref 6.4–8.4)
PROTHROMBIN TIME: 14.4 SECONDS (ref 12.3–15)
RBC # BLD AUTO: 4.87 MILLION/UL (ref 3.88–5.62)
SODIUM SERPL-SCNC: 137 MMOL/L (ref 135–147)
TSH SERPL DL<=0.05 MIU/L-ACNC: 2.03 UIU/ML (ref 0.45–4.5)
WBC # BLD AUTO: 6.8 THOUSAND/UL (ref 4.31–10.16)

## 2025-05-15 PROCEDURE — 85025 COMPLETE CBC W/AUTO DIFF WBC: CPT | Performed by: STUDENT IN AN ORGANIZED HEALTH CARE EDUCATION/TRAINING PROGRAM

## 2025-05-15 PROCEDURE — 83605 ASSAY OF LACTIC ACID: CPT | Performed by: STUDENT IN AN ORGANIZED HEALTH CARE EDUCATION/TRAINING PROGRAM

## 2025-05-15 PROCEDURE — 93005 ELECTROCARDIOGRAM TRACING: CPT

## 2025-05-15 PROCEDURE — 99222 1ST HOSP IP/OBS MODERATE 55: CPT | Performed by: PHYSICIAN ASSISTANT

## 2025-05-15 PROCEDURE — 36415 COLL VENOUS BLD VENIPUNCTURE: CPT | Performed by: STUDENT IN AN ORGANIZED HEALTH CARE EDUCATION/TRAINING PROGRAM

## 2025-05-15 PROCEDURE — 82948 REAGENT STRIP/BLOOD GLUCOSE: CPT

## 2025-05-15 PROCEDURE — 87040 BLOOD CULTURE FOR BACTERIA: CPT | Performed by: STUDENT IN AN ORGANIZED HEALTH CARE EDUCATION/TRAINING PROGRAM

## 2025-05-15 PROCEDURE — 85730 THROMBOPLASTIN TIME PARTIAL: CPT | Performed by: STUDENT IN AN ORGANIZED HEALTH CARE EDUCATION/TRAINING PROGRAM

## 2025-05-15 PROCEDURE — 99285 EMERGENCY DEPT VISIT HI MDM: CPT

## 2025-05-15 PROCEDURE — 96360 HYDRATION IV INFUSION INIT: CPT

## 2025-05-15 PROCEDURE — 85610 PROTHROMBIN TIME: CPT | Performed by: STUDENT IN AN ORGANIZED HEALTH CARE EDUCATION/TRAINING PROGRAM

## 2025-05-15 PROCEDURE — 71045 X-RAY EXAM CHEST 1 VIEW: CPT

## 2025-05-15 PROCEDURE — 84443 ASSAY THYROID STIM HORMONE: CPT | Performed by: STUDENT IN AN ORGANIZED HEALTH CARE EDUCATION/TRAINING PROGRAM

## 2025-05-15 PROCEDURE — 99285 EMERGENCY DEPT VISIT HI MDM: CPT | Performed by: STUDENT IN AN ORGANIZED HEALTH CARE EDUCATION/TRAINING PROGRAM

## 2025-05-15 PROCEDURE — 99214 OFFICE O/P EST MOD 30 MIN: CPT | Performed by: NURSE PRACTITIONER

## 2025-05-15 PROCEDURE — 80053 COMPREHEN METABOLIC PANEL: CPT | Performed by: STUDENT IN AN ORGANIZED HEALTH CARE EDUCATION/TRAINING PROGRAM

## 2025-05-15 PROCEDURE — 84145 PROCALCITONIN (PCT): CPT | Performed by: STUDENT IN AN ORGANIZED HEALTH CARE EDUCATION/TRAINING PROGRAM

## 2025-05-15 RX ORDER — TORSEMIDE 20 MG/1
20 TABLET ORAL DAILY
Status: DISCONTINUED | OUTPATIENT
Start: 2025-05-16 | End: 2025-05-17 | Stop reason: HOSPADM

## 2025-05-15 RX ORDER — PREGABALIN 75 MG/1
150 CAPSULE ORAL 3 TIMES DAILY
Status: DISCONTINUED | OUTPATIENT
Start: 2025-05-15 | End: 2025-05-17 | Stop reason: HOSPADM

## 2025-05-15 RX ORDER — PANTOPRAZOLE SODIUM 40 MG/1
40 TABLET, DELAYED RELEASE ORAL DAILY
Status: DISCONTINUED | OUTPATIENT
Start: 2025-05-16 | End: 2025-05-17 | Stop reason: HOSPADM

## 2025-05-15 RX ORDER — RANOLAZINE 500 MG/1
1000 TABLET, EXTENDED RELEASE ORAL 2 TIMES DAILY
Status: DISCONTINUED | OUTPATIENT
Start: 2025-05-15 | End: 2025-05-17 | Stop reason: HOSPADM

## 2025-05-15 RX ORDER — FERROUS SULFATE 325(65) MG
325 TABLET ORAL
Status: DISCONTINUED | OUTPATIENT
Start: 2025-05-16 | End: 2025-05-17 | Stop reason: HOSPADM

## 2025-05-15 RX ORDER — METOPROLOL SUCCINATE 100 MG/1
100 TABLET, EXTENDED RELEASE ORAL DAILY
Status: DISCONTINUED | OUTPATIENT
Start: 2025-05-16 | End: 2025-05-17 | Stop reason: HOSPADM

## 2025-05-15 RX ORDER — INSULIN GLARGINE 100 [IU]/ML
15 INJECTION, SOLUTION SUBCUTANEOUS
Status: DISCONTINUED | OUTPATIENT
Start: 2025-05-15 | End: 2025-05-17 | Stop reason: HOSPADM

## 2025-05-15 RX ORDER — INSULIN LISPRO 100 [IU]/ML
1-6 INJECTION, SOLUTION INTRAVENOUS; SUBCUTANEOUS
Status: DISCONTINUED | OUTPATIENT
Start: 2025-05-15 | End: 2025-05-17 | Stop reason: HOSPADM

## 2025-05-15 RX ORDER — ACETAMINOPHEN 325 MG/1
650 TABLET ORAL EVERY 6 HOURS PRN
Status: DISCONTINUED | OUTPATIENT
Start: 2025-05-15 | End: 2025-05-17 | Stop reason: HOSPADM

## 2025-05-15 RX ORDER — ENOXAPARIN SODIUM 100 MG/ML
40 INJECTION SUBCUTANEOUS DAILY
Status: DISCONTINUED | OUTPATIENT
Start: 2025-05-16 | End: 2025-05-17 | Stop reason: HOSPADM

## 2025-05-15 RX ORDER — INSULIN LISPRO 100 [IU]/ML
1-6 INJECTION, SOLUTION INTRAVENOUS; SUBCUTANEOUS
Status: DISCONTINUED | OUTPATIENT
Start: 2025-05-16 | End: 2025-05-17 | Stop reason: HOSPADM

## 2025-05-15 RX ORDER — CLOPIDOGREL BISULFATE 75 MG/1
75 TABLET ORAL DAILY
Status: DISCONTINUED | OUTPATIENT
Start: 2025-05-16 | End: 2025-05-17 | Stop reason: HOSPADM

## 2025-05-15 RX ORDER — FAMOTIDINE 20 MG/1
20 TABLET, FILM COATED ORAL
Status: DISCONTINUED | OUTPATIENT
Start: 2025-05-15 | End: 2025-05-17 | Stop reason: HOSPADM

## 2025-05-15 RX ORDER — PENTOXIFYLLINE 400 MG/1
400 TABLET, EXTENDED RELEASE ORAL
Status: DISCONTINUED | OUTPATIENT
Start: 2025-05-16 | End: 2025-05-17 | Stop reason: HOSPADM

## 2025-05-15 RX ORDER — SODIUM CHLORIDE 9 MG/ML
50 INJECTION, SOLUTION INTRAVENOUS CONTINUOUS
Status: DISCONTINUED | OUTPATIENT
Start: 2025-05-15 | End: 2025-05-16

## 2025-05-15 RX ORDER — ATORVASTATIN CALCIUM 40 MG/1
40 TABLET, FILM COATED ORAL
Status: DISCONTINUED | OUTPATIENT
Start: 2025-05-15 | End: 2025-05-17 | Stop reason: HOSPADM

## 2025-05-15 RX ORDER — FENOFIBRATE 145 MG/1
145 TABLET, FILM COATED ORAL DAILY
Status: DISCONTINUED | OUTPATIENT
Start: 2025-05-16 | End: 2025-05-17 | Stop reason: HOSPADM

## 2025-05-15 RX ORDER — DULOXETIN HYDROCHLORIDE 30 MG/1
30 CAPSULE, DELAYED RELEASE ORAL DAILY
Status: DISCONTINUED | OUTPATIENT
Start: 2025-05-16 | End: 2025-05-17 | Stop reason: HOSPADM

## 2025-05-15 RX ORDER — ISOSORBIDE MONONITRATE 60 MG/1
60 TABLET, EXTENDED RELEASE ORAL DAILY
Status: DISCONTINUED | OUTPATIENT
Start: 2025-05-16 | End: 2025-05-17 | Stop reason: HOSPADM

## 2025-05-15 RX ADMIN — PREGABALIN 150 MG: 75 CAPSULE ORAL at 21:11

## 2025-05-15 RX ADMIN — SODIUM CHLORIDE 500 ML: 0.9 INJECTION, SOLUTION INTRAVENOUS at 18:01

## 2025-05-15 RX ADMIN — SODIUM CHLORIDE 50 ML/HR: 0.9 INJECTION, SOLUTION INTRAVENOUS at 21:05

## 2025-05-15 RX ADMIN — ATORVASTATIN CALCIUM 40 MG: 40 TABLET, FILM COATED ORAL at 21:11

## 2025-05-15 RX ADMIN — RANOLAZINE 1000 MG: 500 TABLET, FILM COATED, EXTENDED RELEASE ORAL at 21:12

## 2025-05-15 RX ADMIN — INSULIN GLARGINE 15 UNITS: 100 INJECTION, SOLUTION SUBCUTANEOUS at 21:24

## 2025-05-15 RX ADMIN — INSULIN LISPRO 2 UNITS: 100 INJECTION, SOLUTION INTRAVENOUS; SUBCUTANEOUS at 21:24

## 2025-05-15 NOTE — ASSESSMENT & PLAN NOTE
Status post CABG  Orders:    Albumin / creatinine urine ratio; Future    CBC; Future    PTH, intact; Future    Renal function panel; Future    Urinalysis with microscopic; Future    Vitamin D 25 hydroxy; Future    Magnesium; Future

## 2025-05-15 NOTE — ASSESSMENT & PLAN NOTE
CT with contrast April 2025: Left lower lobe solid nodule measuring 1.5 cm, stable from 9/6/2024, previously 0.8 cm on 8/2/2022. Recommend PET/CT and/or tissue sampling if not already performed.   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  Follow-up in 1 year recommended  Orders:    Albumin / creatinine urine ratio; Future    CBC; Future    PTH, intact; Future    Renal function panel; Future    Urinalysis with microscopic; Future    Vitamin D 25 hydroxy; Future    Magnesium; Future

## 2025-05-15 NOTE — ASSESSMENT & PLAN NOTE
Lab Results   Component Value Date    HGBA1C 6.4 (H) 02/15/2025     On Lantus insulin, Januvia and metformin along with Jardiance  Orders:    Albumin / creatinine urine ratio; Future    CBC; Future    PTH, intact; Future    Renal function panel; Future    Urinalysis with microscopic; Future    Vitamin D 25 hydroxy; Future    Magnesium; Future

## 2025-05-15 NOTE — PATIENT INSTRUCTIONS
Thank you for the kind visit today.  You were seen today for continued monitoring of chronic kidney disease stage III.  At this time kidney function is stable.    Recommendations:  Follow-up in approximately 4 to 6 months  Blood work and urine studies prior to the appointment  Recommend ER evaluation due to hypotension.  EMS called and patient taken to the emergency room.

## 2025-05-15 NOTE — TELEPHONE ENCOUNTER
I called patient, he's currently in the ER. I told him I'd send myself a reminder to call him tomorrow.

## 2025-05-15 NOTE — ASSESSMENT & PLAN NOTE
Wt Readings from Last 3 Encounters:   05/15/25 103 kg (226 lb)   05/08/25 102 kg (224 lb 6.4 oz)   04/28/25 106 kg (233 lb)   Recent echocardiogram ejection fraction 50%.  Moderate to severe aortic stenosis  No evidence of decompensation  Appears euvolemic            Orders:    Albumin / creatinine urine ratio; Future    CBC; Future    PTH, intact; Future    Renal function panel; Future    Urinalysis with microscopic; Future    Vitamin D 25 hydroxy; Future    Magnesium; Future

## 2025-05-15 NOTE — ASSESSMENT & PLAN NOTE
Started on iron tablet last office visit in January  Follow-up iron studies: Remains iron deficient with iron saturation 10% and ferritin 48.  Orders:    Albumin / creatinine urine ratio; Future    CBC; Future    PTH, intact; Future    Renal function panel; Future    Urinalysis with microscopic; Future    Vitamin D 25 hydroxy; Future    Magnesium; Future

## 2025-05-15 NOTE — TELEPHONE ENCOUNTER
----- Message from APOLLO Antonio sent at 5/15/2025  4:02 PM EDT -----  Please call patient. We can cut his isosorbide down to 60 mg a day. Let me know if you need a new prescription sent to the pharmacy. Also he should come in next week for nurse visit for orthostatic vital signs. Thanks  ----- Message -----  From: APOLLO Clark  Sent: 5/15/2025   3:09 PM EDT  To: MD [address]  I have Thomas Andrade in the office for a nephrology visit.  His blood pressure is in the 80s systolic and he is intermittently symptomatic.  He is sometimes seeing dark spots and feeling lightheaded.  Last month he was seen in the emergency room after passing out at night when he went to the bathroom.  Generally blood pressure appears to be very good upon review of recent office visits.He is on isosorbide mononitrate 90 mg daily and Ranexa 1000 mg twice a day along with metoprolol.  Heart rhythm is regular.  Lungs are clear.  No edema.  He is also on torsemide which he states he clearly needs if he misses doses he starts to retain water.We checked his blood sugar here in the office and it was 140.  He was given some juice and crackers.  Unsure if you would like to reduce the dosing of any of his medications.  I hesitate to take this initiative since these are primarily cardiology based medications.

## 2025-05-16 LAB
ANION GAP SERPL CALCULATED.3IONS-SCNC: 10 MMOL/L (ref 4–13)
ATRIAL RATE: 60 BPM
ATRIAL RATE: 69 BPM
ATRIAL RATE: 70 BPM
ATRIAL RATE: 71 BPM
BASOPHILS # BLD AUTO: 0.03 THOUSANDS/ÂΜL (ref 0–0.1)
BASOPHILS NFR BLD AUTO: 1 % (ref 0–1)
BILIRUB UR QL STRIP: NEGATIVE
BUN SERPL-MCNC: 44 MG/DL (ref 5–25)
CALCIUM SERPL-MCNC: 8.9 MG/DL (ref 8.4–10.2)
CHLORIDE SERPL-SCNC: 105 MMOL/L (ref 96–108)
CLARITY UR: CLEAR
CO2 SERPL-SCNC: 24 MMOL/L (ref 21–32)
COLOR UR: YELLOW
CREAT SERPL-MCNC: 1.65 MG/DL (ref 0.6–1.3)
EOSINOPHIL # BLD AUTO: 0.17 THOUSAND/ÂΜL (ref 0–0.61)
EOSINOPHIL NFR BLD AUTO: 3 % (ref 0–6)
ERYTHROCYTE [DISTWIDTH] IN BLOOD BY AUTOMATED COUNT: 15.5 % (ref 11.6–15.1)
GFR SERPL CREATININE-BSD FRML MDRD: 38 ML/MIN/1.73SQ M
GLUCOSE P FAST SERPL-MCNC: 102 MG/DL (ref 65–99)
GLUCOSE SERPL-MCNC: 102 MG/DL (ref 65–140)
GLUCOSE SERPL-MCNC: 114 MG/DL (ref 65–140)
GLUCOSE SERPL-MCNC: 125 MG/DL (ref 65–140)
GLUCOSE SERPL-MCNC: 125 MG/DL (ref 65–140)
GLUCOSE SERPL-MCNC: 209 MG/DL (ref 65–140)
GLUCOSE UR STRIP-MCNC: ABNORMAL MG/DL
HCT VFR BLD AUTO: 39.5 % (ref 36.5–49.3)
HGB BLD-MCNC: 12.3 G/DL (ref 12–17)
HGB UR QL STRIP.AUTO: NEGATIVE
IMM GRANULOCYTES # BLD AUTO: 0.02 THOUSAND/UL (ref 0–0.2)
IMM GRANULOCYTES NFR BLD AUTO: 0 % (ref 0–2)
KETONES UR STRIP-MCNC: NEGATIVE MG/DL
LEUKOCYTE ESTERASE UR QL STRIP: NEGATIVE
LYMPHOCYTES # BLD AUTO: 1.64 THOUSANDS/ÂΜL (ref 0.6–4.47)
LYMPHOCYTES NFR BLD AUTO: 28 % (ref 14–44)
MAGNESIUM SERPL-MCNC: 2.1 MG/DL (ref 1.9–2.7)
MCH RBC QN AUTO: 27.5 PG (ref 26.8–34.3)
MCHC RBC AUTO-ENTMCNC: 31.1 G/DL (ref 31.4–37.4)
MCV RBC AUTO: 88 FL (ref 82–98)
MONOCYTES # BLD AUTO: 0.54 THOUSAND/ÂΜL (ref 0.17–1.22)
MONOCYTES NFR BLD AUTO: 9 % (ref 4–12)
NEUTROPHILS # BLD AUTO: 3.56 THOUSANDS/ÂΜL (ref 1.85–7.62)
NEUTS SEG NFR BLD AUTO: 59 % (ref 43–75)
NITRITE UR QL STRIP: NEGATIVE
NRBC BLD AUTO-RTO: 0 /100 WBCS
PH UR STRIP.AUTO: 6.5 [PH]
PLATELET # BLD AUTO: 172 THOUSANDS/UL (ref 149–390)
PMV BLD AUTO: 12.1 FL (ref 8.9–12.7)
POTASSIUM SERPL-SCNC: 3.7 MMOL/L (ref 3.5–5.3)
PR INTERVAL: 188 MS
PR INTERVAL: 200 MS
PROT UR STRIP-MCNC: NEGATIVE MG/DL
QRS AXIS: -6 DEGREES
QRS AXIS: 4 DEGREES
QRS AXIS: 7 DEGREES
QRS AXIS: 7 DEGREES
QRSD INTERVAL: 192 MS
QRSD INTERVAL: 194 MS
QT INTERVAL: 518 MS
QT INTERVAL: 518 MS
QT INTERVAL: 524 MS
QT INTERVAL: 526 MS
QTC INTERVAL: 555 MS
QTC INTERVAL: 562 MS
QTC INTERVAL: 565 MS
QTC INTERVAL: 568 MS
RBC # BLD AUTO: 4.47 MILLION/UL (ref 3.88–5.62)
SODIUM SERPL-SCNC: 139 MMOL/L (ref 135–147)
SP GR UR STRIP.AUTO: 1.02 (ref 1–1.03)
T WAVE AXIS: 199 DEGREES
T WAVE AXIS: 204 DEGREES
T WAVE AXIS: 209 DEGREES
T WAVE AXIS: 210 DEGREES
UROBILINOGEN UR STRIP-ACNC: <2 MG/DL
VENTRICULAR RATE: 69 BPM
VENTRICULAR RATE: 70 BPM
VENTRICULAR RATE: 70 BPM
VENTRICULAR RATE: 71 BPM
WBC # BLD AUTO: 5.96 THOUSAND/UL (ref 4.31–10.16)

## 2025-05-16 PROCEDURE — 99232 SBSQ HOSP IP/OBS MODERATE 35: CPT | Performed by: FAMILY MEDICINE

## 2025-05-16 PROCEDURE — 93010 ELECTROCARDIOGRAM REPORT: CPT | Performed by: INTERNAL MEDICINE

## 2025-05-16 PROCEDURE — 82948 REAGENT STRIP/BLOOD GLUCOSE: CPT

## 2025-05-16 PROCEDURE — 85025 COMPLETE CBC W/AUTO DIFF WBC: CPT | Performed by: PHYSICIAN ASSISTANT

## 2025-05-16 PROCEDURE — 83735 ASSAY OF MAGNESIUM: CPT | Performed by: PHYSICIAN ASSISTANT

## 2025-05-16 PROCEDURE — 99223 1ST HOSP IP/OBS HIGH 75: CPT | Performed by: INTERNAL MEDICINE

## 2025-05-16 PROCEDURE — 80048 BASIC METABOLIC PNL TOTAL CA: CPT | Performed by: PHYSICIAN ASSISTANT

## 2025-05-16 RX ORDER — SODIUM CHLORIDE, SODIUM GLUCONATE, SODIUM ACETATE, POTASSIUM CHLORIDE, MAGNESIUM CHLORIDE, SODIUM PHOSPHATE, DIBASIC, AND POTASSIUM PHOSPHATE .53; .5; .37; .037; .03; .012; .00082 G/100ML; G/100ML; G/100ML; G/100ML; G/100ML; G/100ML; G/100ML
75 INJECTION, SOLUTION INTRAVENOUS CONTINUOUS
Status: DISCONTINUED | OUTPATIENT
Start: 2025-05-16 | End: 2025-05-17

## 2025-05-16 RX ADMIN — ENOXAPARIN SODIUM 40 MG: 40 INJECTION SUBCUTANEOUS at 08:21

## 2025-05-16 RX ADMIN — ISOSORBIDE MONONITRATE 60 MG: 60 TABLET, EXTENDED RELEASE ORAL at 08:21

## 2025-05-16 RX ADMIN — INSULIN LISPRO 2 UNITS: 100 INJECTION, SOLUTION INTRAVENOUS; SUBCUTANEOUS at 12:04

## 2025-05-16 RX ADMIN — FERROUS SULFATE TAB 325 MG (65 MG ELEMENTAL FE) 325 MG: 325 (65 FE) TAB at 07:23

## 2025-05-16 RX ADMIN — PENTOXIFYLLINE 400 MG: 400 TABLET, EXTENDED RELEASE ORAL at 07:23

## 2025-05-16 RX ADMIN — INSULIN GLARGINE 15 UNITS: 100 INJECTION, SOLUTION SUBCUTANEOUS at 21:01

## 2025-05-16 RX ADMIN — SODIUM CHLORIDE, SODIUM GLUCONATE, SODIUM ACETATE, POTASSIUM CHLORIDE, MAGNESIUM CHLORIDE, SODIUM PHOSPHATE, DIBASIC, AND POTASSIUM PHOSPHATE 75 ML/HR: .53; .5; .37; .037; .03; .012; .00082 INJECTION, SOLUTION INTRAVENOUS at 10:00

## 2025-05-16 RX ADMIN — RANOLAZINE 1000 MG: 500 TABLET, FILM COATED, EXTENDED RELEASE ORAL at 17:00

## 2025-05-16 RX ADMIN — PREGABALIN 150 MG: 75 CAPSULE ORAL at 16:54

## 2025-05-16 RX ADMIN — DULOXETINE 30 MG: 30 CAPSULE, DELAYED RELEASE ORAL at 08:21

## 2025-05-16 RX ADMIN — FENOFIBRATE 145 MG: 145 TABLET, FILM COATED ORAL at 08:22

## 2025-05-16 RX ADMIN — METOPROLOL SUCCINATE 100 MG: 100 TABLET, EXTENDED RELEASE ORAL at 08:21

## 2025-05-16 RX ADMIN — PENTOXIFYLLINE 400 MG: 400 TABLET, EXTENDED RELEASE ORAL at 16:54

## 2025-05-16 RX ADMIN — PREGABALIN 150 MG: 75 CAPSULE ORAL at 21:01

## 2025-05-16 RX ADMIN — TORSEMIDE 20 MG: 20 TABLET ORAL at 08:22

## 2025-05-16 RX ADMIN — PANTOPRAZOLE SODIUM 40 MG: 40 TABLET, DELAYED RELEASE ORAL at 08:22

## 2025-05-16 RX ADMIN — ATORVASTATIN CALCIUM 40 MG: 40 TABLET, FILM COATED ORAL at 16:53

## 2025-05-16 RX ADMIN — PREGABALIN 150 MG: 75 CAPSULE ORAL at 08:22

## 2025-05-16 RX ADMIN — FAMOTIDINE 20 MG: 20 TABLET, FILM COATED ORAL at 21:01

## 2025-05-16 RX ADMIN — PENTOXIFYLLINE 400 MG: 400 TABLET, EXTENDED RELEASE ORAL at 12:05

## 2025-05-16 RX ADMIN — RANOLAZINE 1000 MG: 500 TABLET, FILM COATED, EXTENDED RELEASE ORAL at 08:21

## 2025-05-16 RX ADMIN — CLOPIDOGREL 75 MG: 75 TABLET ORAL at 08:21

## 2025-05-16 NOTE — ASSESSMENT & PLAN NOTE
patient presents with recent lightheadedness, dizziness and positive orthostatic vital signs  patient exam is dry at this time  antihypertensives and diuretics currently on hold  continue IV fluids  continue to monitor orthostatic blood pressures.

## 2025-05-16 NOTE — ASSESSMENT & PLAN NOTE
12/13/2024 TTE: aortic valve with moderate calcification and severely reduced mobility with moderate to severe stenosis, peak aortic valve gradient was 48 mmHg with mean gradient of 29 mm Mercury with calculated JEFF of 0.5 cm².  5/16/2025 TTE: pending

## 2025-05-16 NOTE — PLAN OF CARE
Problem: Potential for Falls  Goal: Patient will remain free of falls  Description: INTERVENTIONS:  - Educate patient/family on patient safety including physical limitations  - Instruct patient to call for assistance with activity   - Consider consulting OT/PT to assist with strengthening/mobility based on AM PAC & JH-HLM score  - Consult OT/PT to assist with strengthening/mobility   - Keep Call bell within reach  - Keep bed low and locked with side rails adjusted as appropriate  - Keep care items and personal belongings within reach  - Initiate and maintain comfort rounds  - Make Fall Risk Sign visible to staff  - Offer Toileting every 2 Hours, in advance of need  - Initiate/Maintain bed alarm  - Obtain necessary fall risk management equipment  - Apply yellow socks and bracelet for high fall risk patients  - Consider moving patient to room near nurses station  Outcome: Progressing     Problem: CARDIOVASCULAR - ADULT  Goal: Maintains optimal cardiac output and hemodynamic stability  Description: INTERVENTIONS:  - Monitor I/O, vital signs and rhythm  - Monitor for S/S and trends of decreased cardiac output  - Administer and titrate ordered vasoactive medications to optimize hemodynamic stability  - Assess quality of pulses, skin color and temperature  - Assess for signs of decreased coronary artery perfusion  - Instruct patient to report change in severity of symptoms  Outcome: Progressing  Goal: Absence of cardiac dysrhythmias or at baseline rhythm  Description: INTERVENTIONS:  - Continuous cardiac monitoring, vital signs, obtain 12 lead EKG if ordered  - Administer antiarrhythmic and heart rate control medications as ordered  - Monitor electrolytes and administer replacement therapy as ordered  Outcome: Progressing     Problem: DISCHARGE PLANNING  Goal: Discharge to home or other facility with appropriate resources  Description: INTERVENTIONS:  - Identify barriers to discharge w/patient and caregiver  - Arrange  for needed discharge resources and transportation as appropriate  - Identify discharge learning needs (meds, wound care, etc.)  - Arrange for interpretive services to assist at discharge as needed  - Refer to Case Management Department for coordinating discharge planning if the patient needs post-hospital services based on physician/advanced practitioner order or complex needs related to functional status, cognitive ability, or social support system  Outcome: Progressing     Problem: Knowledge Deficit  Goal: Patient/family/caregiver demonstrates understanding of disease process, treatment plan, medications, and discharge instructions  Description: Complete learning assessment and assess knowledge base.  Interventions:  - Provide teaching at level of understanding  - Provide teaching via preferred learning methods  Outcome: Progressing

## 2025-05-16 NOTE — ASSESSMENT & PLAN NOTE
12/16/2024 patient underwent implantation of [employer] dual chambered pacemaker  most recent interrogation noted normal function

## 2025-05-16 NOTE — ASSESSMENT & PLAN NOTE
history of CABG times three in 2002 and PCI with drug-eluting stent to distal left circ in 2021  10/19/2022 [facility]: triple vessel disease with 100% occluded right coronary artery, 100% occluded mid LAD. SVG to RCA was also hundred percent occluded but RCA had collaterals from left circulation. [place] to LAD was widely patent and no flow was noted in the SVG to the circumflex  continue aspirin 81 mg once a day  continue Ranexa 1000 mg BID

## 2025-05-16 NOTE — ASSESSMENT & PLAN NOTE
Lab Results   Component Value Date    HGBA1C 6.4 (H) 02/15/2025       Recent Labs     05/15/25  2115   POCGLU 217*       Blood Sugar Average: Last 72 hrs:  (P) 217    Home regimen: Lantus 15 units nightly, metformin, Januvia, Jardiance  Hold home oral meds, resume at discharge  Continue Lantus 15 units nightly, sliding scale insulin coverage, Accu-Cheks  Hypoglycemia protocol

## 2025-05-16 NOTE — ASSESSMENT & PLAN NOTE
Currently with symptomatic hypotension, orthostatic hypotension  Hold Imdur, Toprol-XL, Demadex and monitor blood pressures

## 2025-05-16 NOTE — ASSESSMENT & PLAN NOTE
Lab Results   Component Value Date    HGBA1C 6.4 (H) 02/15/2025       Recent Labs     05/15/25  2115 05/16/25  0717   POCGLU 217* 125       Blood Sugar Average: Last 72 hrs:  (P) 171  managed per primary team

## 2025-05-16 NOTE — H&P
"H&P - Hospitalist   Name: Thomas Horne 80 y.o. male I MRN: [id number]  Unit/Bed#: [address] [place] [address] I Date of Admission: 5/15/2025   Date of Service: 5/15/2025 I Hospital Day: 0     Assessment & Plan  Orthostatic hypotension  Patient presents to the ER due to symptomatic hypotension noted at nephrology appointment SBP 80s with lightheadedness and seeing \"black spots\"   Patient is on multiple cardiac medications which is likely source of his hypotension.   Orthostatic VS + in the ER  lying to 92 standing  Gentle IVF NS 50cc/hr x 12h -- monitor volume status closely due to hx of CHF  Per outpatient cardiology recs, will decrease Imdur to 60mg daily. Continue rest of medications with hold parameters.   Repeat orthostatic VS in the AM   Cardiology consult appreciated   Chronic diastolic congestive heart failure (HCC)  Wt Readings from Last 3 Encounters:   05/15/25 103 kg (226 lb)   05/08/25 102 kg (224 lb 6.4 oz)   04/28/25 106 kg (233 lb)     Patient currently examines dry.  Noted to have orthostatic hypotension.   12/13/2024 echocardiogram shows EF 50%.  Moderate to severe aortic stenosis.  Home diuretic: torsemide 20 mg daily  Receiving gentle IVF overnight 50cc/hr x 12h  Monitor volume status closely   Continue torsemide 20mg daily   I/O, Daily weights        Mixed hyperlipidemia  Continue statin, fenofibrate  Primary hypertension  Currently with symptomatic hypotension, orthostatic hypotension  Decrease Imdur to 60 mg daily  Continue Toprol- mg daily, torsemide 20 mg daily  Coronary artery disease involving native coronary artery of native heart without angina pectoris  History of CAD status post CABG  Continue Plavix, statin, beta-blocker, Ranexa 1000 mg twice daily  Imdur decreased to 60 mg daily due to hypotension  Type 2 diabetes mellitus with diabetic polyneuropathy, with long-term current use of insulin (HCC)  Lab Results   Component Value Date    HGBA1C 6.4 (H) 02/15/2025       Recent Labs     " 05/15/25  2115   POCGLU 217*       Blood Sugar Average: Last 72 hrs:  (P) 217    Home regimen: Lantus 15 units nightly, metformin, Januvia, Jardiance  Hold home oral meds, resume at discharge  Continue Lantus 15 units nightly, sliding scale insulin coverage, Accu-Cheks  Hypoglycemia protocol  Chronic kidney disease-mineral and bone disorder  Lab Results   Component Value Date    EGFR 34 05/15/2025    EGFR 40 05/14/2025    EGFR 40 04/14/2025    CREATININE 1.81 (H) 05/15/2025    CREATININE 1.58 (H) 05/14/2025    CREATININE 1.56 (H) 04/14/2025     History of CKD stage IIIb -follows with nephrology as outpatient  Baseline creatinine 1.6-1.8  Monitor BMP  History of DVT (deep vein thrombosis)  History of DVT no longer on anticoagulation  S/P placement of cardiac pacemaker  History of sick sinus syndrome status post PPM 12/13/2024      VTE Pharmacologic Prophylaxis: VTE Score: 4 Moderate Risk (Score 3-4) - Pharmacological DVT Prophylaxis Ordered: enoxaparin (Lovenox).  Code Status: Level 1 - Full Code   Discussion with family: Patient declined call to .     Anticipated Length of Stay: Patient will be admitted on an observation basis with an anticipated length of stay of less than 2 midnights secondary to symptomatic orthostatic hypotension.    History of Present Illness   Chief Complaint: Low blood pressure with lightheadedness    Thomas Horne is a 80 y.o. male with a PMH of diastolic CHF, type 2 diabetes, DVT not on anticoagulation, sick sinus syndrome status post pacemaker placement, CKD, prostate cancer status post prostatectomy, AAA repair, CAD status post CABG, hypertension, hyperlipidemia who presents with low blood pressure with lightheadedness and seeing black spots at outpatient nephrology appointment today.  Systolic blood pressure reportedly in the 80s.  Patient denies having issues with hypotension in the past.  At the time of my evaluation, patient is seen eating food and reports to be feeling  well.  His systolic blood pressure in bed is 100-110.  However, despite receiving 500 cc IV fluid bolus in the ER, patient continues to have positive orthostatic vital signs.  Patient states that he has been eating and drinking well at home.  He denies any chest pain or shortness of breath.  No palpitations or increased lower extremity edema.  Patient reports that he took all of his medications this morning.    Review of Systems   Constitutional:  Negative for activity change, appetite change, chills, fatigue and fever.   Respiratory:  Negative for cough and shortness of breath.    Cardiovascular:  Negative for chest pain, palpitations and leg swelling.   Gastrointestinal:  Negative for abdominal pain, diarrhea, nausea and vomiting.   Genitourinary:  Negative for dysuria.   Neurological:  Positive for light-headedness. Negative for weakness.   Psychiatric/Behavioral:  Negative for confusion.         Historical Information   Past Medical History:   Diagnosis Date    Acute kidney injury superimposed on chronic kidney disease  (HCC) 02/16/2022    Anemia     CAD (coronary artery disease)     Callus     Cancer (HCC)     prostate    CHF (congestive heart failure) (HCC)     Chronic kidney disease     Clotting disorder (HCC)     Coronary artery disease     Deep vein thrombosis (HCC)     Diabetes mellitus (HCC)     Difficulty walking     Duodenal ulcer     Ear problems     Erectile dysfunction     Heart disease 1988    Heart murmur     HL (hearing loss)     Hyperlipidemia     Hypertension     Myocardial infarction (HCC)     Neuropathy     Bilateral feet    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)     Plantar fasciitis     Prostate cancer (HCC) 2012    Sleep apnea     Could not tolerate CPAP    Urinary incontinence     Vascular disorder 2023     Past Surgical History:   Procedure Laterality Date    ABDOMINAL AORTIC ANEURYSM REPAIR      Stented    ADENOIDECTOMY      BACK SURGERY  1985    CARDIAC CATHETERIZATION Left 10/19/2022     Procedure: Cardiac Left Heart Cath;  Surgeon: Danielle Pereira MD;  Location: [facility];  Service: Cardiology    CARDIAC ELECTROPHYSIOLOGY PROCEDURE Left 2024    Procedure: Cardiac pacer implant;  Surgeon: Danielle Pereira MD;  Location: [facility];  Service: Cardiology    CARDIAC SURGERY      3 cardiac bypass then angioplasty 2020    CHOLECYSTECTOMY      COLONOSCOPY      CORONARY ARTERY BYPASS GRAFT      IR LOWER EXTREMITY ANGIOGRAM  2023    IR LOWER EXTREMITY ANGIOGRAM  2024    IR LOWER EXTREMITY ANGIOGRAM  2025    LAMINECTOMY      AR BYPASS W/VEIN FEMORAL-POPLITEAL Right 2023    Procedure: BYPASS FEMORAL-POPLITEAL WITH CRYO VEIN, RIGHT FEMORAL ENDARTERECTOMY;  Surgeon: Vasquez Clark MD;  Location: [facility];  Service: Vascular    AR BYPASS W/VEIN FEMORAL-POPLITEAL Left 2024    Procedure: left lower extremity above knee popliteal to below knee popliteal artery bypass with PTFE graft;  Surgeon: Vasquez Clark MD;  Location: [facility];  Service: Vascular    AR SLCTV CATHJ 3RD+ ORD SLCTV ABDL PEL/LXTR BRNCH Right 2023    Procedure: ARTERIOGRAM Right lower extremity arteriogram with CO2 via right groin access;  Surgeon: Vasquez Clark MD;  Location: [facility];  Service: Vascular    AR SLCTV CATHJ 3RD+ ORD SLCTV ABDL PEL/LXTR BRNCH Left 2024    Procedure: diagnostic LLE Arteriogram;  Surgeon: Vasquez Clark MD;  Location: [facility];  Service: Vascular    PROSTATE SURGERY      PROSTATECTOMY      TONSILLECTOMY      UPPER GASTROINTESTINAL ENDOSCOPY  2024    URINARY SPHINCTER IMPLANT       Social History     Tobacco Use    Smoking status: Former     Current packs/day: 0.00     Average packs/day: 1.5 packs/day for 35.0 years (52.5 ttl pk-yrs)     Types: Cigarettes     Start date: 1956     Quit date:      Years since quittin.3     Passive exposure: Past    Smokeless tobacco:  Never   Vaping Use    Vaping status: Never Used   Substance and Sexual Activity    Alcohol use: Yes     Alcohol/week: 14.0 standard drinks of alcohol     Types: 14 Cans of beer per week     Comment: stopped last few months    Drug use: Never    Sexual activity: Not Currently     Partners: Female     Birth control/protection: Male Sterilization     E-Cigarette/Vaping    E-Cigarette Use Never User      E-Cigarette/Vaping Substances    Nicotine No     THC No     CBD No     Flavoring No     Other No     Unknown No      Family History   Problem Relation Age of Onset    Diabetes Mother     Varicose Veins Mother     Alcohol abuse Father     Heart disease Brother     Cancer Sister         Thyroid    Cancer Sister         Colon    Cancer Brother         Throat    Thyroid disease Brother     Cancer Brother     Colon cancer Brother     Diabetes Sister     Thyroid disease Sister     Colon cancer Sister     Colon cancer Sister     Colon cancer Sister     Mental illness Neg Hx      Social History:  Marital Status:    Patient Pre-hospital Living Situation: Home  Patient Pre-hospital Level of Mobility: unable to be assessed at time of evaluation  Patient Pre-hospital Diet Restrictions: cardiac    Meds/Allergies   I have reviewed home medications with patient personally.  Prior to Admission medications    Medication Sig Start Date End Date Taking? Authorizing Provider   atorvastatin (LIPITOR) 40 mg tablet Take 1 tablet (40 mg total) by mouth daily with dinner 2/19/25  Yes Danielle Pereira MD   clopidogrel (PLAVIX) 75 mg tablet Take 1 tablet (75 mg total) by mouth daily 4/8/25  Yes Danielle Pereira MD   DULoxetine (CYMBALTA) 30 mg delayed release capsule Take 1 capsule (30 mg total) by mouth daily 3/10/25  Yes Sundar Arango DPM   Empagliflozin (Jardiance) 25 MG TABS TAKE 1 TABLET BY MOUTH DAILY 12/23/24  Yes Frank Lombardi, DO   famotidine (PEPCID) 40 MG tablet Take 1 tablet (40 mg total) by mouth daily at bedtime 2/20/25  5/21/25 Yes Anthony Carty MD   fenofibrate 160 MG tablet TAKE 1 TABLET BY MOUTH DAILY 12/24/24  Yes Danielle Pereira MD   isosorbide mononitrate (IMDUR) 30 mg 24 hr tablet Take 3 tablets (90 mg total) by mouth daily 3/18/25  Yes Danielle Pereira MD   Januvia 100 MG tablet TAKE ONE TABLET BY MOUTH DAILY 4/21/25  Yes Frank Lombardi, DO   metFORMIN (GLUCOPHAGE) 500 mg tablet TAKE 1 TABLET BY MOUTH TWICE  DAILY WITH MEALS 4/28/25  Yes Frank Lombardi, DO   metoprolol succinate (TOPROL-XL) 100 mg 24 hr tablet Take 1 tablet (100 mg total) by mouth daily 4/30/25  Yes Danielle Pereira MD   Omega-3 Fatty Acids (fish oil) 1,000 mg Take 4,000 mg by mouth in the morning and 4,000 mg in the evening.   Yes Wally King MD   pantoprazole (PROTONIX) 40 mg tablet TAKE 1 TABLET BY MOUTH DAILY 4/25/25  Yes Tj Paulino PA-C   pentoxifylline (TRENtal) 400 mg ER tablet TAKE 1 TABLET BY MOUTH 3 TIMES  DAILY WITH MEALS 9/16/24  Yes Sundar Arango DPM   pregabalin (LYRICA) 150 mg capsule Take 1 capsule (150 mg total) by mouth 3 (three) times a day 3/10/25 6/8/25 Yes Sundar Arango DPM   ranolazine (RANEXA) 1000 MG SR tablet Take 1 tablet (1,000 mg total) by mouth 2 (two) times a day 3/11/25 3/6/26 Yes Danielle Pereira MD   torsemide (DEMADEX) 20 mg tablet TAKE 1 TABLET BY MOUTH DAILY 4/25/25  Yes Danielle Pereira MD   acetaminophen (TYLENOL) 325 mg tablet Take 2 tablets (650 mg total) by mouth every 6 (six) hours as needed for mild pain 9/4/24   APOLLO Abrams   Blood Glucose Monitoring Suppl (OneTouch Verio Reflect) w/Device KIT Check blood sugars twice daily. Please substitute with appropriate alternative as covered by patient's insurance. Dx: E11.65 2/22/22   Frank Lombardi, DO   cyclobenzaprine (FLEXERIL) 10 mg tablet Take 0.5 tablets (5 mg total) by mouth 2 (two) times a day as needed for muscle spasms for up to 5 days  Patient not taking: Reported on 5/15/2025 4/14/25 5/15/25  Ethel Stapleton DO    ferrous sulfate 324 (65 Fe) mg Take 1 tablet (324 mg total) by mouth every other day 1/21/25   Jose Fischer MD   glucose blood (OneTouch Verio) test strip TEST TWICE A DAY 3/31/25   Frank Lombardi, DO   insulin glargine (LANTUS) 100 units/mL subcutaneous injection Inject 15 Units under the skin daily at bedtime 2/18/25 2/18/26  Frank Lombardi, DO   lidocaine (LIDODERM) 5 % Apply 1 patch topically over 12 hours daily for 2 days Remove & Discard patch within 12 hours or as directed by MD 4/14/25 4/16/25  Ethel Stapleton DO   Multiple Vitamins-Minerals (MULTIVITAMIN MEN 50+ PO) Take by mouth in the morning.    Historical Provider, MD   nitroglycerin (NITROSTAT) 0.4 mg SL tablet Place 1 tablet (0.4 mg total) under the tongue every 5 (five) minutes as needed for chest pain  Patient not taking: Reported on 1/15/2025 5/10/24 12/20/24  Naa Cruz PA-C   ondansetron (ZOFRAN) 4 mg tablet Take 1 tablet (4 mg total) by mouth every 12 (twelve) hours as needed for nausea or vomiting 2/20/25   Anthony Carty MD   OneTouch Delica Lancets 33G MISC Check blood sugars twice daily. Please substitute with appropriate alternative as covered by patient's insurance. Dx: E11.65 2/22/22   Frank Lombardi, DO   oxyCODONE-acetaminophen (PERCOCET) 5-325 mg per tablet Take 1 tablet by mouth as needed in the morning and 1 tablet as needed in the evening for moderate pain or severe pain.    Historical Provider, MD   Sure Comfort Pen Needles 32G X 4 MM MISC TAKE ONE TABLET BY MOUTH DAILY 4/10/25   Frank Lombardi, DO   sitaGLIPtin (JANUVIA) 25 mg tablet Take 1 tablet (25 mg total) by mouth daily  Patient not taking: Reported on 4/28/2025 12/18/24 5/15/25  Arthur Cavazos, DO     Allergies   Allergen Reactions    Lisinopril Rash and Lip Swelling       Objective :  Temp:  [97.2 °F (36.2 °C)-97.9 °F (36.6 °C)] 97.9 °F (36.6 °C)  HR:  [65-76] 70  BP: ()/(52-71) 109/53  Resp:  [14-20] 14  SpO2:  [94 %-99 %] 94 %  O2 Device: None (Room  air)    Physical Exam  Constitutional:       General: He is not in acute distress.     Appearance: He is not ill-appearing, toxic-appearing or diaphoretic.   HENT:      Mouth/Throat:      Mouth: Mucous membranes are moist.     Eyes:      General: No scleral icterus.      Cardiovascular:      Rate and Rhythm: Normal rate and regular rhythm.      Heart sounds: Normal heart sounds.   Pulmonary:      Effort: Pulmonary effort is normal.      Breath sounds: Normal breath sounds.   Abdominal:      General: Abdomen is flat. There is no distension.      Palpations: Abdomen is soft.      Tenderness: There is no abdominal tenderness.     Musculoskeletal:      Right lower leg: No edema.      Left lower leg: No edema.     Skin:     General: Skin is warm and dry.     Neurological:      General: No focal deficit present.      Mental Status: He is alert and oriented to person, place, and time.     Psychiatric:         Mood and Affect: Mood normal.          Lines/Drains:            Lab Results: I have reviewed the following results:  Results from last 7 days   Lab Units 05/15/25  1651   WBC Thousand/uL 6.80   HEMOGLOBIN g/dL 13.4   HEMATOCRIT % 43.4   PLATELETS Thousands/uL 216   SEGS PCT % 68   LYMPHO PCT % 22   MONO PCT % 8   EOS PCT % 2     Results from last 7 days   Lab Units 05/15/25  1651   SODIUM mmol/L 137   POTASSIUM mmol/L 5.2   CHLORIDE mmol/L 101   CO2 mmol/L 24   BUN mg/dL 43*   CREATININE mg/dL 1.81*   ANION GAP mmol/L 12   CALCIUM mg/dL 9.3   ALBUMIN g/dL 4.2   TOTAL BILIRUBIN mg/dL 0.57   ALK PHOS U/L 39   ALT U/L 19   AST U/L 37   GLUCOSE RANDOM mg/dL 130     Results from last 7 days   Lab Units 05/15/25  1711   INR  1.07     Results from last 7 days   Lab Units 05/15/25  2115   POC GLUCOSE mg/dl 217*     Lab Results   Component Value Date    HGBA1C 6.4 (H) 02/15/2025    HGBA1C 6.2 (H) 10/14/2024    HGBA1C 7.0 (H) 09/03/2024     Results from last 7 days   Lab Units 05/15/25  1651   LACTIC ACID mmol/L 1.6    PROCALCITONIN ng/ml 0.07       Imaging Results Review: I reviewed radiology reports from this admission including: chest xray.  Other Study Results Review: EKG was reviewed.     Administrative Statements   I have spent a total time of 60 minutes in caring for this patient on the day of the visit/encounter including Diagnostic results, Prognosis, Risks and benefits of tx options, Instructions for management, Patient and family education, Importance of tx compliance, Risk factor reductions, Impressions, Counseling / Coordination of care, Documenting in the medical record, Reviewing/placing orders in the medical record (including tests, medications, and/or procedures), Obtaining or reviewing history  , and Communicating with other healthcare professionals .    ** Please Note: This note has been constructed using a voice recognition system. **

## 2025-05-16 NOTE — ASSESSMENT & PLAN NOTE
History of CAD status post CABG  Continue Plavix, statin, Ranexa 1000 mg twice daily  Holding Toprol-XL, Imdur

## 2025-05-16 NOTE — PLAN OF CARE
Problem: Potential for Falls  Goal: Patient will remain free of falls  Description: INTERVENTIONS:  - Educate patient/family on patient safety including physical limitations  - Instruct patient to call for assistance with activity   - Consider consulting OT/PT to assist with strengthening/mobility based on AM PAC & JH-HLM score  - Consult OT/PT to assist with strengthening/mobility   - Keep Call bell within reach  - Keep bed low and locked with side rails adjusted as appropriate  - Keep care items and personal belongings within reach  - Initiate and maintain comfort rounds  - Make Fall Risk Sign visible to staff  - Offer Toileting every 2 Hours, in advance of need  - Initiate/Maintain bed alarm  - Obtain necessary fall risk management equipment: call bell  - Apply yellow socks and bracelet for high fall risk patients  - Consider moving patient to room near nurses station  Outcome: Progressing     Problem: CARDIOVASCULAR - ADULT  Goal: Maintains optimal cardiac output and hemodynamic stability  Description: INTERVENTIONS:  - Monitor I/O, vital signs and rhythm  - Monitor for S/S and trends of decreased cardiac output  - Administer and titrate ordered vasoactive medications to optimize hemodynamic stability  - Assess quality of pulses, skin color and temperature  - Assess for signs of decreased coronary artery perfusion  - Instruct patient to report change in severity of symptoms  Outcome: Progressing  Goal: Absence of cardiac dysrhythmias or at baseline rhythm  Description: INTERVENTIONS:  - Continuous cardiac monitoring, vital signs, obtain 12 lead EKG if ordered  - Administer antiarrhythmic and heart rate control medications as ordered  - Monitor electrolytes and administer replacement therapy as ordered  Outcome: Progressing     Problem: DISCHARGE PLANNING  Goal: Discharge to home or other facility with appropriate resources  Description: INTERVENTIONS:  - Identify barriers to discharge w/patient and  caregiver  - Arrange for needed discharge resources and transportation as appropriate  - Identify discharge learning needs (meds, wound care, etc.)  - Arrange for interpretive services to assist at discharge as needed  - Refer to Case Management Department for coordinating discharge planning if the patient needs post-hospital services based on physician/advanced practitioner order or complex needs related to functional status, cognitive ability, or social support system  Outcome: Progressing     Problem: Knowledge Deficit  Goal: Patient/family/caregiver demonstrates understanding of disease process, treatment plan, medications, and discharge instructions  Description: Complete learning assessment and assess knowledge base.  Interventions:  - Provide teaching at level of understanding  - Provide teaching via preferred learning methods  Outcome: Progressing

## 2025-05-16 NOTE — ASSESSMENT & PLAN NOTE
Wt Readings from Last 3 Encounters:   05/15/25 103 kg (226 lb)   05/08/25 102 kg (224 lb 6.4 oz)   04/28/25 106 kg (233 lb)     Patient currently examines dry.  Noted to have orthostatic hypotension.   12/13/2024 echocardiogram shows EF 50%.  Moderate to severe aortic stenosis.  Home diuretic: torsemide 20 mg daily  Receiving gentle IVF overnight 50cc/hr x 12h  Monitor volume status closely   Continue torsemide 20mg daily   I/O, Daily weights

## 2025-05-16 NOTE — ASSESSMENT & PLAN NOTE
"Patient presents to the ER due to symptomatic hypotension noted at nephrology appointment SBP 80s with lightheadedness and seeing \"black spots\"   Patient is on multiple cardiac medications which is likely source of his hypotension.   Orthostatic VS + in the ER  lying to 92 standing  Continue IV fluids at 75 mL/h monitor volume status closely due to hx of CHF  Repeat orthostatic VS in the AM   Cardiology input appreciated   "

## 2025-05-16 NOTE — ASSESSMENT & PLAN NOTE
Lab Results   Component Value Date    EGFR 38 05/16/2025    EGFR 34 05/15/2025    EGFR 40 05/14/2025    CREATININE 1.65 (H) 05/16/2025    CREATININE 1.81 (H) 05/15/2025    CREATININE 1.58 (H) 05/14/2025     History of CKD stage IIIb -follows with nephrology as outpatient  Baseline creatinine 1.6-1.8, creatinine continues to remain at baseline  Monitor BMP

## 2025-05-16 NOTE — ASSESSMENT & PLAN NOTE
Wt Readings from Last 3 Encounters:   05/16/25 101 kg (222 lb 3.2 oz)   05/15/25 103 kg (226 lb)   05/08/25 102 kg (224 lb 6.4 oz)   patient examines dry  antihypertensives and diuretics currently on hold  continue IV fluids  continue to monitor I and O, weights and labs

## 2025-05-16 NOTE — UTILIZATION REVIEW
Initial Clinical Review    Observation 5/15/25 @ 1927 converted to inpatient admission 5/16/25 @ 1432 for continued care & tx for orthostatic hypotension.    Admission: Date/Time/Statement:   Admission Orders (From admission, onward)       Ordered        05/16/25 1432  INPATIENT ADMISSION  Once            05/15/25 1927  Place in Observation  Once                          Orders Placed This Encounter   Procedures    INPATIENT ADMISSION     Standing Status:   Standing     Number of Occurrences:   1     Level of Care:   Med Surg [16]     Estimated length of stay:   More than 2 Midnights     Certification:   I certify that inpatient services are medically necessary for this patient for a duration of greater than two midnights. See H&P and MD Progress Notes for additional information about the patient's course of treatment.     ED Arrival Information       Expected   -    Arrival   5/15/2025 16:30    Acuity   Emergent              Means of arrival   Ambulance    Escorted by   [facility] Hospitalist    Admission type   Emergency              Arrival complaint   -             Chief Complaint   Patient presents with    Hypotension     Pt was at endocrinologist office and had a low bp reading of 64/55, upon arrival pt alert oriented x4       Initial Presentation:   80 yom to ER from endocrinologist office via EMS for low BP, near syncope. Patient had an episode about a month ago where he passed out. Was seen in the ED, workup was negative, and he was discharged. He does report intermittent black spots in her peripheral vision and lightheadedness. Today was at the endocrinology office for his diabetes. His blood pressure was checked and was low in the 60s. Presents with /59. Admission CXR neg. Labs: BUN 43, Cr 1.81.  Placed in observation status for orthostatic hypotension. Plan: IVF, telemetry, accuchecks, fluid restriction, cardio consult, orthostatic VS.     Observation to IP admission  5/16/25:  Orthostatic hypotension POA. IVF continued with rate increase to 75cc/hr, telemetry monitoring in progress. Monitor BP's, orthostatics, cardio following.    Per cardio: near syncope, orthostatic hypotension   Examines dry, antihypertensives & diuretics on hold. Continue IVF, monitor orthostatics. Continue telemetry      ED Treatment-Medication Administration from 05/15/2025 1630 to 05/15/2025 2018         Date/Time Order Dose Route Action     05/15/2025 1801 sodium chloride 0.9 % bolus 500 mL 500 mL Intravenous New Bag            Scheduled Medications:  Medications 05/07 05/08 05/09 05/10 05/11 05/12 05/13 05/14 05/15 05/16   atorvastatin (LIPITOR) tablet 40 mg  Dose: 40 mg  Freq: Daily with dinner Route: PO  Start: 05/15/25 2045            2111      1630        clopidogrel (PLAVIX) tablet 75 mg  Dose: 75 mg  Freq: Daily Route: PO  Start: 05/16/25 0900   Admin Instructions:                0821        DULoxetine (CYMBALTA) delayed release capsule 30 mg  Dose: 30 mg  Freq: Daily Route: PO  Start: 05/16/25 0900   Admin Instructions:                0821        enoxaparin (LOVENOX) subcutaneous injection 40 mg  Dose: 40 mg  Freq: Daily Route: SC  Start: 05/16/25 0900   Admin Instructions:                0821        famotidine (PEPCID) tablet 20 mg  Dose: 20 mg  Freq: Daily at bedtime Route: PO  Start: 05/15/25 2200            (2114)      2200        fenofibrate (TRICOR) tablet 145 mg  Dose: 145 mg  Freq: Daily Route: PO  Start: 05/16/25 0900   Admin Instructions:                0822        ferrous sulfate tablet 325 mg  Dose: 325 mg  Freq: Daily with breakfast Route: PO  Start: 05/16/25 0730   Admin Instructions:                0723        insulin glargine (LANTUS) subcutaneous injection 15 Units 0.15 mL  Dose: 15 Units  Freq: Daily at bedtime Route: SC  Start: 05/15/25 2200   Admin Instructions:               2124 2200        insulin lispro (HumALOG/ADMELOG) 100 units/mL subcutaneous injection 1-6  Units  Dose: 1-6 Units  Freq: Daily at bedtime Route: SC  Start: 05/15/25 2200   Admin Instructions:               2124 2200         insulin lispro (HumALOG/ADMELOG) 100 units/mL subcutaneous injection 1-6 Units  Dose: 1-6 Units  Freq: 3 times daily before meals Route: SC  Start: 05/16/25 0700   Admin Instructions:                [phone]        isosorbide mononitrate (IMDUR) 24 hr tablet 60 mg  Dose: 60 mg  Freq: Daily Route: PO  Start: 05/16/25 0900   Admin Instructions:      Order specific questions:                0821 0834        metoprolol succinate (TOPROL-XL) 24 hr tablet 100 mg  Dose: 100 mg  Freq: Daily Route: PO  Start: 05/16/25 0900   Admin Instructions:      Order specific questions:                0821 0872        pantoprazole (PROTONIX) EC tablet 40 mg  Dose: 40 mg  Freq: Daily Route: PO  Start: 05/16/25 0900   Admin Instructions:                0822        pentoxifylline (TRENtal) ER tablet 400 mg  Dose: 400 mg  Freq: 3 times daily with meals Route: PO  Start: 05/16/25 0730   Admin Instructions:                0743     1204     1630        pregabalin (LYRICA) capsule 150 mg  Dose: 150 mg  Freq: 3 times daily Route: PO  Start: 05/15/25 2100   Admin Instructions:               2111 0822 1600     2100        ranolazine (RANEXA) 12 hr tablet 1,000 mg  Dose: 1,000 mg  Freq: 2 times daily Route: PO  Start: 05/15/25 2045   Admin Instructions:               2112 0821     1800        sodium chloride 0.9 % bolus 500 mL  Dose: 500 mL  Freq: Once Route: IV  Last Dose: 500 mL (05/15/25 1801)  Start: 05/15/25 1745 End: 05/15/25 2001            1801         torsemide (DEMADEX) tablet 20 mg  Dose: 20 mg  Freq: Daily Route: PO  Start: 05/16/25 0900   Order specific questions:                0822     0829        Legend:       [id number]/0705/0805/0905/1005/1105/1205/1305/1405/1505/16        Continuous Meds Sorted by Name  for Thomas Horne as of 05/07/25 through  5/16/25  Legend:       Medications 05/07 05/08 05/09 05/10 05/11 05/12 05/13 05/14 05/15 05/16   multi-electrolyte (Plasmalyte-A/Isolyte-S PH 7.4/Normosol-R) IV solution  Rate: 75 mL/hr Dose: 75 mL/hr  Freq: Continuous Route: IV  Indications of Use: IV Hydration  Last Dose: 75 mL/hr (05/16/25 1000)  Start: 05/16/25 0845             1000        sodium chloride 0.9 % infusion  Rate: 50 mL/hr Dose: 50 mL/hr  Freq: Continuous Route: IV  Last Dose: Stopped (05/16/25 0828)  Start: 05/15/25 2045 End: 05/16/25 0839 2105 0828 [C]     0839-D/C'd                  PRN Meds Sorted by Name  for Thomas Horne as of 05/07/25 through 5/16/25  Legend:       Medications 05/07 05/08 05/09 05/10 05/11 05/12 05/13 05/14 05/15 05/16   acetaminophen (TYLENOL) tablet 650 mg  Dose: 650 mg  Freq: Every 6 hours PRN Route: PO  PRN Reasons: mild pain,headaches,fever  Indications of Use: FEVER,HEADACHE,MILD PAIN  Start: 05/15/25 2042                                ED Triage Vitals   Temperature Pulse Respirations Blood Pressure SpO2 Pain Score   05/15/25 1641 05/15/25 1641 05/15/25 1641 05/15/25 1645 05/15/25 1641 05/15/25 2030   97.9 °F (36.6 °C) 71 20 102/59 98 % No Pain     Weight (last 2 days)       Date/Time Weight    05/16/25 0540 101 (222.2)            Vital Signs (last 3 days)       Date/Time Temp Pulse Resp BP MAP (mmHg) SpO2 O2 Device Patient Position - Orthostatic VS Pain    05/16/25 11:43:39 98.1 °F (36.7 °C) 77 17 102/54 70 93 % -- Standing - Orthostatic VS --    05/16/25 11:38:27 -- 75 -- 104/54 71 94 % -- -- --    05/16/25 11:37:47 98 °F (36.7 °C) 69 17 104/54 71 -- -- Sitting - Orthostatic VS --    05/16/25 0800 -- -- -- -- -- 97 % None (Room air) -- No Pain    05/16/25 07:20:16 98 °F (36.7 °C) 67 18 105/53 70 96 % -- Lying - Orthostatic VS --    05/16/25 0700 -- 77 17 102/54 -- -- -- Lying - Orthostatic VS --    05/16/25 02:30:55 -- 70 14 107/53 71 95 % -- -- --    05/15/25 22:05:47 97.9 °F (36.6 °C) 70 14 109/53  72 94 % -- -- --    05/15/25 20:36:20 97.2 °F (36.2 °C) 65 20 122/71 88 97 % -- -- --    05/15/25 2030 -- -- -- -- -- -- -- -- No Pain    05/15/25 1915 -- 69 17 126/68 93 98 % None (Room air) -- --    05/15/25 1900 -- 76 -- 92/55 -- -- -- Standing - Orthostatic VS --    05/15/25 1859 -- 74 -- 109/65 -- -- -- Sitting - Orthostatic VS --    05/15/25 1858 -- 72 -- 114/69 -- -- -- Lying - Orthostatic VS --    05/15/25 1715 -- 70 17 97/57 72 -- -- -- --    05/15/25 1645 -- 70 20 102/59 75 -- -- -- --    05/15/25 1641 97.9 °F (36.6 °C) 71 20 -- -- 98 % None (Room air) -- --              Pertinent Labs/Diagnostic Test Results:   Radiology:  XR chest 1 view portable   Final Interpretation by Soraya Trinidad MD (05/15 1802)      No acute cardiopulmonary disease.            Workstation performed: [id number]           Cardiology:  ECG 12 lead   Final Result by Naun Bell MD (05/16 0903)   AV dual-paced rhythm with intrinsic complexes   Abnormal ECG   Confirmed by Naun Bell ([id number]) on 5/16/2025 9:03:13 AM      ECG 12 lead   Final Result by Naun Bell MD (05/16 0903)   AV dual-paced rhythm   Abnormal ECG   Confirmed by Naun Bell ([id number]) on 5/16/2025 9:03:06 AM      ECG 12 lead   Final Result by Naun Bell MD (05/16 0902)   AV dual-paced rhythm   Abnormal ECG   Confirmed by Naun Bell ([id number]) on 5/16/2025 9:02:34 AM      ECG 12 lead   Final Result by Naun Bell MD (05/16 0902)   AV dual-paced rhythm   Abnormal ECG   Confirmed by Naun Bell ([id number]) on 5/16/2025 9:02:20 AM        GI:  No orders to display           Results from last 7 days   Lab Units 05/16/25  0445 05/15/25  1651 05/14/25  1625   WBC Thousand/uL 5.96 6.80 7.68   HEMOGLOBIN g/dL 12.3 13.4 14.6   HEMATOCRIT % 39.5 43.4 47.0   PLATELETS Thousands/uL 172 216 225   TOTAL NEUT ABS Thousands/µL 3.56 4.60  --          Results from last 7 days   Lab Units 05/16/25  0445 05/15/25  1651 05/14/25  1625   SODIUM mmol/L 139 137 141   POTASSIUM mmol/L 3.7  "5.2 4.2   CHLORIDE mmol/L 105 101 101   CO2 mmol/L 24 24 27   ANION GAP mmol/L 10 12 13   BUN mg/dL 44* 43* 38*   CREATININE mg/dL 1.65* 1.81* 1.58*   EGFR ml/min/1.73sq m 38 34 40   CALCIUM mg/dL 8.9 9.3 9.9   MAGNESIUM mg/dL 2.1  --  2.0   PHOSPHORUS mg/dL  --   --  4.0     Results from last 7 days   Lab Units 05/15/25  1651 05/14/25  1625   AST U/L 37  --    ALT U/L 19  --    ALK PHOS U/L 39  --    TOTAL PROTEIN g/dL 7.8  --    ALBUMIN g/dL 4.2 4.6   TOTAL BILIRUBIN mg/dL 0.57  --      Results from last 7 days   Lab Units 05/16/25  1124 05/16/25  0717 05/15/25  2115   POC GLUCOSE mg/dl 209* 125 217*     Results from last 7 days   Lab Units 05/16/25  0445 05/15/25  1651 05/14/25  1625   GLUCOSE RANDOM mg/dL 102 130 119             No results found for: \"BETA-HYDROXYBUTYRATE\"                           Results from last 7 days   Lab Units 05/15/25  1711   PROTIME seconds 14.4   INR  1.07   PTT seconds 27     Results from last 7 days   Lab Units 05/15/25  1651   TSH 3RD GENERATON uIU/mL 2.032     Results from last 7 days   Lab Units 05/15/25  1651   PROCALCITONIN ng/ml 0.07     Results from last 7 days   Lab Units 05/15/25  1651   LACTIC ACID mmol/L 1.6                 Results from last 7 days   Lab Units 05/14/25  1625   FERRITIN ng/mL 48   IRON SATURATION % 10*   IRON ug/dL 45*   TIBC ug/dL 436.8     Results from last 7 days   Lab Units 05/14/25  1625   TRANSFERRIN mg/dL 312                             Results from last 7 days   Lab Units 05/16/25  0800 05/14/25  1625   CLARITY UA  Clear  --    COLOR UA  Yellow  --    SPEC GRAV UA  1.020  --    PH UA  6.5  --    GLUCOSE UA mg/dl 1000 (1%)*  --    KETONES UA mg/dl Negative  --    BLOOD UA  Negative  --    PROTEIN UA mg/dl Negative  --    NITRITE UA  Negative  --    BILIRUBIN UA  Negative  --    UROBILINOGEN UA (BE) mg/dl <2.0  --    LEUKOCYTES UA  Negative  --    CREATININE UR mg/dL  --  25.2                                 Results from last 7 days   Lab Units " 05/15/25  1711 05/15/25  1651   BLOOD CULTURE  Received in Microbiology Lab. Culture in Progress. Received in Microbiology Lab. Culture in Progress.                   Past Medical History:   Diagnosis Date    Acute kidney injury superimposed on chronic kidney disease  (HCC) 02/16/2022    Anemia     CAD (coronary artery disease)     Callus     Cancer (HCC)     prostate    CHF (congestive heart failure) (HCC)     Chronic kidney disease     Clotting disorder (HCC)     Coronary artery disease     Deep vein thrombosis (HCC)     Diabetes mellitus (HCC)     Difficulty walking     Duodenal ulcer     Ear problems     Erectile dysfunction     Heart disease 1988    Heart murmur     HL (hearing loss)     Hyperlipidemia     Hypertension     Myocardial infarction (HCC)     Neuropathy     Bilateral feet    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)     Plantar fasciitis     Prostate cancer (HCC) 2012    Sleep apnea     Could not tolerate CPAP    Urinary incontinence     Vascular disorder 2023     Present on Admission:   Mixed hyperlipidemia   Primary hypertension   Coronary artery disease involving native coronary artery of native heart without angina pectoris   Chronic kidney disease-mineral and bone disorder   Chronic diastolic congestive heart failure (HCC)   S/P placement of cardiac pacemaker   Nonrheumatic aortic valve stenosis      Admitting Diagnosis: Orthostatic hypotension [I95.1]  Hypotension [I95.9]  Age/Sex: 80 y.o. male    Network Utilization Review Department  ATTENTION: Please call with any questions or concerns to [phone] and carefully listen to the prompts so that you are directed to the right person. All voicemails are confidential.   For Discharge needs, contact Care Management DC Support Team at [phone] opt. 2  Send all requests for admission clinical reviews, approved or denied determinations and any other requests to dedicated fax number below belonging to the campus where the patient is receiving treatment. List  of dedicated fax numbers for the Facilities:  FACILITY NAME UR FAX NUMBER   ADMISSION DENIALS (Administrative/Medical Necessity) [phone]   DISCHARGE SUPPORT TEAM (NETWORK) [phone]   PARENT CHILD HEALTH (Maternity/NICU/Pediatrics) [phone]   [facility] [phone]   [facility] [phone]   [facility] [phone]   [facility] [phone]   [facility] [phone]   [facility] [phone]   [facility] [phone]   [facility] [phone]   [facility] [phone]   [facility] [phone]   General acute hospital [phone]   [facility] [phone]

## 2025-05-16 NOTE — ASSESSMENT & PLAN NOTE
Lab Results   Component Value Date    HGBA1C 6.4 (H) 02/15/2025       Recent Labs     05/15/25  2115 05/16/25  0717 05/16/25  1124   POCGLU 217* 125 209*     Home regimen: Lantus 15 units nightly, metformin, Januvia, Jardiance  Hold home oral meds, resume at discharge  Continue Lantus 15 units nightly, SSI coverage, Accu-Cheks

## 2025-05-16 NOTE — ASSESSMENT & PLAN NOTE
Lab Results   Component Value Date    EGFR 34 05/15/2025    EGFR 40 05/14/2025    EGFR 40 04/14/2025    CREATININE 1.81 (H) 05/15/2025    CREATININE 1.58 (H) 05/14/2025    CREATININE 1.56 (H) 04/14/2025     History of CKD stage IIIb -follows with nephrology as outpatient  Baseline creatinine 1.6-1.8  Monitor BMP

## 2025-05-16 NOTE — ASSESSMENT & PLAN NOTE
Currently with symptomatic hypotension, orthostatic hypotension  Decrease Imdur to 60 mg daily  Continue Toprol- mg daily, torsemide 20 mg daily

## 2025-05-16 NOTE — ASSESSMENT & PLAN NOTE
History of CAD status post CABG  Continue Plavix, statin, beta-blocker, Ranexa 1000 mg twice daily  Imdur decreased to 60 mg daily due to hypotension

## 2025-05-16 NOTE — ASSESSMENT & PLAN NOTE
"Patient presents to the ER due to symptomatic hypotension noted at nephrology appointment SBP 80s with lightheadedness and seeing \"black spots\"   Patient is on multiple cardiac medications which is likely source of his hypotension.   Orthostatic VS + in the ER  lying to 92 standing  Gentle IVF NS 50cc/hr x 12h -- monitor volume status closely due to hx of CHF  Per outpatient cardiology recs, will decrease Imdur to 60mg daily. Continue rest of medications with hold parameters.   Repeat orthostatic VS in the AM   Cardiology consult appreciated   "

## 2025-05-16 NOTE — CASE MANAGEMENT
Case Management Assessment & Discharge Planning Note    Patient name Thomas Horne  Location [facility] [place] [facility] [facility] MRN [id number]  : 1945 Date 2025       Current Admission Date: 5/15/2025  Current Admission Diagnosis:Orthostatic hypotension   Patient Active Problem List    Diagnosis Date Noted    Diabetes mellitus with diabetic polyneuropathy, with long-term current use of insulin ([facility]) 05/15/2025    Abnormal CT scan 05/15/2025    Orthostatic hypotension 05/15/2025    Diabetic ulcer of toe of right foot associated with type 2 diabetes mellitus, with necrosis of bone ([facility]) 2025    Constipation 2025    Gram-positive bacteremia 2025    Acute metabolic encephalopathy 2025    Bacteremia 2025    S/P placement of cardiac pacemaker 2024    Bradycardia 2024    Multiple open wounds of lower extremity 2024    Ulcer of right foot (HCC) 2024    SOB (shortness of breath) 2024    Nausea 2024    Elevated troponin 2024    History of DVT (deep vein thrombosis) 2024    Diabetic ulcer of left foot associated with type 2 diabetes mellitus, with muscle involvement without evidence of necrosis ([facility]) 2024    Plantar fasciitis, right 07/10/2024    Acute deep vein thrombosis (DVT) of left peroneal vein ([facility]) 2024    Iron deficiency anemia 2024    Shortness of breath 2024    History of colon polyps 2024    Duodenal ulcer 2024    Multiple gastric ulcers 2023    Iron deficiency anemia due to chronic blood loss 2023    Melena 2023    Symptomatic anemia 2023    Frequent PVCs 2023    Chronic diastolic congestive heart failure (HCC) 2023    Acute kidney injury superimposed on stage 3b chronic kidney disease (HCC) 2023    Diabetic ulcer of right midfoot associated with diabetes mellitus due to underlying condition, limited to breakdown of skin (HCC) 06/15/2023     Hypertensive urgency 05/11/2023    Elevated troponin level not due myocardial infarction 05/11/2023    Stable angina pectoris ([facility]) 03/07/2023    Chronic kidney disease-mineral and bone disorder 11/30/2022    Nonrheumatic aortic valve stenosis 11/11/2022    Gross hematuria 07/26/2022    Platelets decreased ([facility]) 07/22/2022    Actinic keratoses 01/14/2022    Type 2 diabetes mellitus with diabetic peripheral angiopathy without gangrene, with long-term current use of insulin ([facility]) 01/11/2022    Varicose veins of left lower extremity 06/25/2021    History of endovascular stent graft for abdominal aortic aneurysm (AAA) 06/25/2021    Bruit (arterial) 06/25/2021    PAD (peripheral artery disease) ([facility]) 06/25/2021    Type 2 diabetes mellitus with diabetic polyneuropathy, with long-term current use of insulin ([facility]) 06/10/2021    Mixed hyperlipidemia 05/11/2021    Primary hypertension 05/11/2021    Coronary artery disease involving native coronary artery of native heart without angina pectoris 05/11/2021    S/P angioplasty with stent 05/11/2021    Hx of CABG 05/11/2021    S/P AAA repair 05/11/2021    S/P prostatectomy 05/11/2021    H/O prostate cancer 05/11/2021      LOS (days): 0  Geometric Mean LOS (GMLOS) (days):   Days to GMLOS:     OBJECTIVE:      Current admission status: Observation     Preferred Pharmacy:   [facility] [place], [place] - [address] [address]  Phone: [phone] Fax: [phone]    [facility] - [place], [place] - [address]  [address]  [facility] [address]  [facility] [address]  Phone: [phone] Fax: [phone]    Primary Care Provider: Frank Lombardi, DO    Primary Insurance: JUAN BROOKS  Secondary Insurance:     ASSESSMENT:  Active Health Care Proxies       Cierra Hodgesfer Health Care Representative - Daughter   Primary Phone: [phone] (Mobile)            Readmission Root Cause  30 Day Readmission: No    Patient  Information  Admitted from:: Home  Mental Status: Alert  During Assessment patient was accompanied by: Not accompanied during assessment  Assessment information provided by:: Patient  Primary Caregiver: Family  Caregiver's Name:: Whitley Hodges (dtr)  Caregiver's Relationship to Patient:: Family Member  Caregiver's Telephone Number:: [phone]  Support Systems: Self, Daughter  County of Residence: [place]  What city do you live in?: [place], [place]  Home entry access options. Select all that apply.: Stairs  Number of steps to enter home.: 4  Type of Current Residence: 3 story home  Upon entering residence, is there a bedroom on the main floor (no further steps)?: No  A bedroom is located on the following floor levels of residence (select all that apply):: 2nd Floor  Upon entering residence, is there a bathroom on the main floor (no further steps)?: Yes (1/2 bath 1st floor, full bath 2nd floor)  Number of steps to 2nd floor from main floor: One Flight  Living Arrangements: Lives w/ Daughter (Whitley)    Activities of Daily Living Prior to Admission  Functional Status: Independent  Completes ADLs independently?: Yes  Ambulates independently?: Yes  Does patient use assisted devices?: Yes  Assisted Devices (DME) used: Straight Cane (as needed)  Does patient currently own DME?: Yes  What DME does the patient currently own?: Straight Cane, Walker  Does patient have a history of Outpatient Therapy (PT/OT)?: Yes  Does the patient have a history of Short-Term Rehab?: No  Does patient have a history of HHC?: Yes  Does patient currently have HHC?: No     Patient Information Continued  Income Source: Pension/residential  Does patient have prescription coverage?: Yes  Can the patient afford their medications and any related supplies (such as glucometers or test strips)?: Yes  Does patient receive dialysis treatments?: No     Means of Transportation  Means of Transport to [facility]:: Drives Self    DISCHARGE DETAILS:    Discharge  planning discussed with:: Patient  Freedom of Choice: Yes  Comments - Freedom of Choice: Patient choice is to discharge home. Patient declines referral for HHC and denies having any questions, concerns, or anticipated needs SW can assist with at this time.     Were Treatment Team discharge recommendations reviewed with patient/caregiver?: Yes  Did patient/caregiver verbalize understanding of patient care needs?: Yes  Were patient/caregiver advised of the risks associated with not following Treatment Team discharge recommendations?: Yes    Contacts  Patient Contacts: Whitley Hodges (dtr)  Relationship to Patient:: Family  Contact Method: Phone  Phone Number: [phone]  Reason/Outcome: Emergency Contact    Requested Home Health Care         Is the patient interested in HHC at discharge?: No    DME Referral Provided  Referral made for DME?: No    Other Referral/Resources/Interventions Provided:  Interventions: None Indicated     Treatment Team Recommendation: Home  Discharge Destination Plan:: Home  Transport at Discharge : Family

## 2025-05-16 NOTE — ASSESSMENT & PLAN NOTE
Wt Readings from Last 3 Encounters:   05/16/25 101 kg (222 lb 3.2 oz)   05/15/25 103 kg (226 lb)   05/08/25 102 kg (224 lb 6.4 oz)     Patient currently examines dry.  Noted to have orthostatic hypotension.   12/13/2024 echocardiogram shows EF 50%.  Moderate to severe aortic stenosis.  Home diuretic: torsemide 20 mg daily which is currently being held  I/O, Daily weights

## 2025-05-16 NOTE — CONSULTS
Consultation - Cardiology   Name: Thomas Horne 80 y.o. male I MRN: [id number]  Unit/Bed#: 4 [place] [address] I Date of Admission: 5/15/2025   Date of Service: 5/16/2025 I Hospital Day: 0   Inpatient consult to Cardiology  Consult performed by: APOLLO Antonio  Consult ordered by: Whitley Warren PA-C      Physician Requesting Evaluation: Oly Erickson DO   Reason for Evaluation / Principal Problem: near syncope, orthostatic hypotension    Assessment & Plan  Orthostatic hypotension  patient presents with recent lightheadedness, dizziness and positive orthostatic vital signs  patient exam is dry at this time  antihypertensives and diuretics currently on hold  continue IV fluids  continue to monitor orthostatic blood pressures.  Nonrheumatic aortic valve stenosis  12/13/2024 TTE: aortic valve with moderate calcification and severely reduced mobility with moderate to severe stenosis, peak aortic valve gradient was 48 mmHg with mean gradient of 29 mm Mercury with calculated JEFF of 0.5 cm².  5/16/2025 TTE: pending  Primary hypertension  documented history  now orthostatic  antihypertensives currently on hold  continue to monitor  Coronary artery disease involving native coronary artery of native heart without angina pectoris  history of CABG times three in 2002 and PCI with drug-eluting stent to distal left circ in 2021  10/19/2022 LHC: triple vessel disease with 100% occluded right coronary artery, 100% occluded mid LAD. SVG to RCA was also hundred percent occluded but RCA had collaterals from left circulation. [place] to LAD was widely patent and no flow was noted in the SVG to the circumflex  continue aspirin 81 mg once a day  continue Ranexa 1000 mg BID  Mixed hyperlipidemia  continue Lipitor 40 mg daily  Type 2 diabetes mellitus with diabetic polyneuropathy, with long-term current use of insulin ([facility])  Lab Results   Component Value Date    HGBA1C 6.4 (H) 02/15/2025       Recent Labs     05/15/25  2110  05/16/25  0717   POCGLU 217* 125       Blood Sugar Average: Last 72 hrs:  (P) 171  managed per primary team  Chronic kidney disease-mineral and bone disorder  Lab Results   Component Value Date    EGFR 38 05/16/2025    EGFR 34 05/15/2025    EGFR 40 05/14/2025    CREATININE 1.65 (H) 05/16/2025    CREATININE 1.81 (H) 05/15/2025    CREATININE 1.58 (H) 05/14/2025   monitor with IV hydration  Chronic diastolic congestive heart failure (HCC)  Wt Readings from Last 3 Encounters:   05/16/25 101 kg (222 lb 3.2 oz)   05/15/25 103 kg (226 lb)   05/08/25 102 kg (224 lb 6.4 oz)   patient examines dry  antihypertensives and diuretics currently on hold  continue IV fluids  continue to monitor I and O, weights and labs  S/P placement of cardiac pacemaker  12/16/2024 patient underwent implantation of [employer] dual chambered pacemaker  most recent interrogation noted normal function      History of Present Illness   Thomas Horne is a 80 y.o. male who presented to the emergency room for evaluation of low blood pressure. Patient was at the nephrology office earlier during the day and was noted to have a blood pressure of 80/40. Nurse practitioner reached out and it was noted that patient should decrease his dose of isosorbide. Of note patient was seen in the emergency room in April after fall at home. He states he was walking into the bathroom when suddenly he woke up on the floor. He notes that he felt as if everything was closing in on him prior to falling. He also has been noting with initial position changes feeling lightheaded and at times having tunnel vision. In the emergency room patient did have positive orthostatic's with line blood pressure of 114/69 and standing of 92/55. Antihypertensive medications are currently on hold    Patient had 2D echocardiogram on 12/13/2024 which noted in EF of 50% by biplane measurement, moderately dilated left atrium is mildly dilated right atrium, moderate to severe aortic stenosis with peak  gradient of 48 mmHg and mild mitral valve regurgitation. Patient is scheduled for repeat echocardiogram today.    Review of Systems   Constitutional:  Positive for activity change and fatigue.   HENT: Negative.  Negative for congestion, hearing loss, sinus pain and trouble swallowing.    Eyes: Negative.  Negative for photophobia and visual disturbance.   Respiratory: Negative.  Negative for chest tightness and shortness of breath.    Cardiovascular: Negative.  Negative for chest pain, palpitations and leg swelling.   Gastrointestinal: Negative.    Endocrine: Negative.  Negative for polydipsia, polyphagia and polyuria.   Genitourinary: Negative.  Negative for difficulty urinating.   Musculoskeletal: Negative.    Skin: Negative.    Neurological:  Positive for dizziness, syncope, weakness and light-headedness.   Hematological: Negative.    Psychiatric/Behavioral: Negative.       Historical Information   Past Medical History:   Diagnosis Date    Acute kidney injury superimposed on chronic kidney disease  (HCC) 02/16/2022    Anemia     CAD (coronary artery disease)     Callus     Cancer (HCC)     prostate    CHF (congestive heart failure) (HCC)     Chronic kidney disease     Clotting disorder (HCC)     Coronary artery disease     Deep vein thrombosis (HCC)     Diabetes mellitus (HCC)     Difficulty walking     Duodenal ulcer     Ear problems     Erectile dysfunction     Heart disease 1988    Heart murmur     HL (hearing loss)     Hyperlipidemia     Hypertension     Myocardial infarction (HCC)     Neuropathy     Bilateral feet    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)     Plantar fasciitis     Prostate cancer (HCC) 2012    Sleep apnea     Could not tolerate CPAP    Urinary incontinence     Vascular disorder 2023     Past Surgical History:   Procedure Laterality Date    ABDOMINAL AORTIC ANEURYSM REPAIR      Stented    ADENOIDECTOMY      BACK SURGERY  1985    CARDIAC CATHETERIZATION Left 10/19/2022    Procedure: Cardiac Left Heart  Cath;  Surgeon: Danielle Pereira MD;  Location: [facility];  Service: Cardiology    CARDIAC ELECTROPHYSIOLOGY PROCEDURE Left 2024    Procedure: Cardiac pacer implant;  Surgeon: Danielle Pereira MD;  Location: [facility];  Service: Cardiology    CARDIAC SURGERY      3 cardiac bypass then angioplasty 2020    CHOLECYSTECTOMY      COLONOSCOPY      CORONARY ARTERY BYPASS GRAFT      IR LOWER EXTREMITY ANGIOGRAM  2023    IR LOWER EXTREMITY ANGIOGRAM  2024    IR LOWER EXTREMITY ANGIOGRAM  2025    LAMINECTOMY      ID BYPASS W/VEIN FEMORAL-POPLITEAL Right 2023    Procedure: BYPASS FEMORAL-POPLITEAL WITH CRYO VEIN, RIGHT FEMORAL ENDARTERECTOMY;  Surgeon: Vasquez Clark MD;  Location: [facility];  Service: Vascular    ID BYPASS W/VEIN FEMORAL-POPLITEAL Left 2024    Procedure: left lower extremity above knee popliteal to below knee popliteal artery bypass with PTFE graft;  Surgeon: Vasquez Clark MD;  Location: [facility];  Service: Vascular    ID SLCTV CATHJ 3RD+ ORD SLCTV ABDL PEL/LXTR BRNCH Right 2023    Procedure: ARTERIOGRAM Right lower extremity arteriogram with CO2 via right groin access;  Surgeon: Vasquez Clark MD;  Location: [facility];  Service: Vascular    ID SLCTV CATHJ 3RD+ ORD SLCTV ABDL PEL/LXTR BRNCH Left 2024    Procedure: diagnostic LLE Arteriogram;  Surgeon: Vasquez Clark MD;  Location: [facility];  Service: Vascular    PROSTATE SURGERY      PROSTATECTOMY      TONSILLECTOMY      UPPER GASTROINTESTINAL ENDOSCOPY  2024    URINARY SPHINCTER IMPLANT       Social History     Tobacco Use    Smoking status: Former     Current packs/day: 0.00     Average packs/day: 1.5 packs/day for 35.0 years (52.5 ttl pk-yrs)     Types: Cigarettes     Start date: 1956     Quit date:      Years since quittin.3     Passive exposure: Past    Smokeless tobacco: Never   Vaping Use    Vaping  status: Never Used   Substance and Sexual Activity    Alcohol use: Yes     Alcohol/week: 14.0 standard drinks of alcohol     Types: 14 Cans of beer per week     Comment: stopped last few months    Drug use: Never    Sexual activity: Not Currently     Partners: Female     Birth control/protection: Male Sterilization     E-Cigarette/Vaping    E-Cigarette Use Never User      E-Cigarette/Vaping Substances    Nicotine No     THC No     CBD No     Flavoring No     Other No     Unknown No      Family History   Problem Relation Age of Onset    Diabetes Mother     Varicose Veins Mother     Alcohol abuse Father     Heart disease Brother     Cancer Sister         Thyroid    Cancer Sister         Colon    Cancer Brother         Throat    Thyroid disease Brother     Cancer Brother     Colon cancer Brother     Diabetes Sister     Thyroid disease Sister     Colon cancer Sister     Colon cancer Sister     Colon cancer Sister     Mental illness Neg Hx      Social History     Tobacco Use    Smoking status: Former     Current packs/day: 0.00     Average packs/day: 1.5 packs/day for 35.0 years (52.5 ttl pk-yrs)     Types: Cigarettes     Start date: 1956     Quit date:      Years since quittin.3     Passive exposure: Past    Smokeless tobacco: Never   Vaping Use    Vaping status: Never Used   Substance and Sexual Activity    Alcohol use: Yes     Alcohol/week: 14.0 standard drinks of alcohol     Types: 14 Cans of beer per week     Comment: stopped last few months    Drug use: Never    Sexual activity: Not Currently     Partners: Female     Birth control/protection: Male Sterilization       Current Facility-Administered Medications:     acetaminophen (TYLENOL) tablet 650 mg, Q6H PRN    atorvastatin (LIPITOR) tablet 40 mg, Daily With Dinner    clopidogrel (PLAVIX) tablet 75 mg, Daily    DULoxetine (CYMBALTA) delayed release capsule 30 mg, Daily    enoxaparin (LOVENOX) subcutaneous injection 40 mg, Daily    famotidine (PEPCID)  tablet 20 mg, HS    fenofibrate (TRICOR) tablet 145 mg, Daily    ferrous sulfate tablet 325 mg, Daily With Breakfast    insulin glargine (LANTUS) subcutaneous injection 15 Units 0.15 mL, HS    insulin lispro (HumALOG/ADMELOG) 100 units/mL subcutaneous injection 1-6 Units, TID AC **AND** Fingerstick Glucose (POCT), TID AC    insulin lispro (HumALOG/ADMELOG) 100 units/mL subcutaneous injection 1-6 Units, HS    isosorbide mononitrate (IMDUR) 24 hr tablet 60 mg, Daily    metoprolol succinate (TOPROL-XL) 24 hr tablet 100 mg, Daily    multi-electrolyte (Plasmalyte-A/Isolyte-S PH 7.4/Normosol-R) IV solution, Continuous    pantoprazole (PROTONIX) EC tablet 40 mg, Daily    pentoxifylline (TRENtal) ER tablet 400 mg, TID With Meals    pregabalin (LYRICA) capsule 150 mg, TID    ranolazine (RANEXA) 12 hr tablet 1,000 mg, BID    torsemide (DEMADEX) tablet 20 mg, Daily  Prior to Admission Medications   Prescriptions Last Dose Informant Patient Reported? Taking?   Blood Glucose Monitoring Suppl (OneTouch Verio Reflect) w/Device KIT  Self No No   Sig: Check blood sugars twice daily. Please substitute with appropriate alternative as covered by patient's insurance. Dx: E11.65   DULoxetine (CYMBALTA) 30 mg delayed release capsule 5/15/2025 Self No Yes   Sig: Take 1 capsule (30 mg total) by mouth daily   Empagliflozin (Jardiance) 25 MG TABS 5/15/2025 Self No Yes   Sig: TAKE 1 TABLET BY MOUTH DAILY   Januvia 100 MG tablet 5/15/2025 Self No Yes   Sig: TAKE ONE TABLET BY MOUTH DAILY   Multiple Vitamins-Minerals (MULTIVITAMIN MEN 50+ PO)  Self Yes No   Sig: Take by mouth in the morning.   Omega-3 Fatty Acids (fish oil) 1,000 mg 5/15/2025 Self Yes Yes   Sig: Take 4,000 mg by mouth in the morning and 4,000 mg in the evening.   OneTouch Delica Lancets 33G MISC  Self No No   Sig: Check blood sugars twice daily. Please substitute with appropriate alternative as covered by patient's insurance. Dx: E11.65   Sure Comfort Pen Needles 32G X 4 MM MISC   Self No No   Sig: TAKE ONE TABLET BY MOUTH DAILY   acetaminophen (TYLENOL) 325 mg tablet  Self No No   Sig: Take 2 tablets (650 mg total) by mouth every 6 (six) hours as needed for mild pain   atorvastatin (LIPITOR) 40 mg tablet 5/14/2025 Self No Yes   Sig: Take 1 tablet (40 mg total) by mouth daily with dinner   clopidogrel (PLAVIX) 75 mg tablet 5/15/2025 Self No Yes   Sig: Take 1 tablet (75 mg total) by mouth daily   cyclobenzaprine (FLEXERIL) 10 mg tablet Not Taking Self No No   Sig: Take 0.5 tablets (5 mg total) by mouth 2 (two) times a day as needed for muscle spasms for up to 5 days   Patient not taking: Reported on 5/15/2025   famotidine (PEPCID) 40 MG tablet Past Month Self No Yes   Sig: Take 1 tablet (40 mg total) by mouth daily at bedtime   fenofibrate 160 MG tablet 5/15/2025 Self No Yes   Sig: TAKE 1 TABLET BY MOUTH DAILY   ferrous sulfate 324 (65 Fe) mg  Self No No   Sig: Take 1 tablet (324 mg total) by mouth every other day   glucose blood (OneTouch Verio) test strip  Self No No   Sig: TEST TWICE A DAY   insulin glargine (LANTUS) 100 units/mL subcutaneous injection  Self No No   Sig: Inject 15 Units under the skin daily at bedtime   isosorbide mononitrate (IMDUR) 30 mg 24 hr tablet 5/15/2025 Self No Yes   Sig: Take 3 tablets (90 mg total) by mouth daily   lidocaine (LIDODERM) 5 %   No No   Sig: Apply 1 patch topically over 12 hours daily for 2 days Remove & Discard patch within 12 hours or as directed by MD   metFORMIN (GLUCOPHAGE) 500 mg tablet 5/15/2025 Self No Yes   Sig: TAKE 1 TABLET BY MOUTH TWICE  DAILY WITH MEALS   metoprolol succinate (TOPROL-XL) 100 mg 24 hr tablet 5/15/2025 Self No Yes   Sig: Take 1 tablet (100 mg total) by mouth daily   nitroglycerin (NITROSTAT) 0.4 mg SL tablet  Self No No   Sig: Place 1 tablet (0.4 mg total) under the tongue every 5 (five) minutes as needed for chest pain   Patient not taking: Reported on 1/15/2025   ondansetron (ZOFRAN) 4 mg tablet  Self No No   Sig: Take  1 tablet (4 mg total) by mouth every 12 (twelve) hours as needed for nausea or vomiting   oxyCODONE-acetaminophen (PERCOCET) 5-325 mg per tablet  Self Yes No   Sig: Take 1 tablet by mouth as needed in the morning and 1 tablet as needed in the evening for moderate pain or severe pain.   pantoprazole (PROTONIX) 40 mg tablet 5/15/2025 Self No Yes   Sig: TAKE 1 TABLET BY MOUTH DAILY   pentoxifylline (TRENtal) 400 mg ER tablet 5/15/2025 Self No Yes   Sig: TAKE 1 TABLET BY MOUTH 3 TIMES  DAILY WITH MEALS   pregabalin (LYRICA) 150 mg capsule 5/15/2025 Self No Yes   Sig: Take 1 capsule (150 mg total) by mouth 3 (three) times a day   ranolazine (RANEXA) 1000 MG SR tablet 5/15/2025 Self No Yes   Sig: Take 1 tablet (1,000 mg total) by mouth 2 (two) times a day   torsemide (DEMADEX) 20 mg tablet 5/15/2025 Self No Yes   Sig: TAKE 1 TABLET BY MOUTH DAILY      Facility-Administered Medications: None     Lisinopril    Objective :  Temp:  [97.2 °F (36.2 °C)-98 °F (36.7 °C)] 98 °F (36.7 °C)  HR:  [65-76] 67  BP: ()/(52-71) 105/53  Resp:  [14-20] 14  SpO2:  [94 %-99 %] 96 %  O2 Device: None (Room air)  Orthostatic Blood Pressures      Flowsheet Row Most Recent Value   Blood Pressure 105/53 filed at 05/16/2025 0720   Patient Position - Orthostatic VS Standing - Orthostatic VS filed at 05/15/2025 1900          First Weight: Weight - Scale: 101 kg (222 lb 3.2 oz) (05/16/25 0540)  Vitals:    05/16/25 0540   Weight: 101 kg (222 lb 3.2 oz)       Physical Exam  Vitals and nursing note reviewed.   Constitutional:       General: He is not in acute distress.     Appearance: Normal appearance. He is obese.   HENT:      Right Ear: External ear normal.      Left Ear: External ear normal.     Eyes:      General:         Right eye: No discharge.         Left eye: No discharge.       Cardiovascular:      Rate and Rhythm: Normal rate and regular rhythm.      Pulses: Normal pulses.   Pulmonary:      Effort: Pulmonary effort is normal. No  "respiratory distress.      Breath sounds: Normal breath sounds.   Abdominal:      General: There is no distension.      Palpations: Abdomen is soft.     Musculoskeletal:      Right lower leg: No edema.      Left lower leg: No edema.     Skin:     General: Skin is warm and dry.      Capillary Refill: Capillary refill takes less than 2 seconds.     Neurological:      Mental Status: He is alert and oriented to person, place, and time. Mental status is at baseline.     Psychiatric:         Mood and Affect: Mood normal.         Lab Results: I have reviewed the following results:  Results from last 7 days   Lab Units 05/16/25  0445 05/15/25  1651 05/14/25  1625   WBC Thousand/uL 5.96 6.80 7.68   HEMOGLOBIN g/dL 12.3 13.4 14.6   HEMATOCRIT % 39.5 43.4 47.0   PLATELETS Thousands/uL 172 216 225     Results from last 7 days   Lab Units 05/16/25  0445 05/15/25  1651 05/14/25  1625   POTASSIUM mmol/L 3.7 5.2 4.2   CHLORIDE mmol/L 105 101 101   CO2 mmol/L 24 24 27   BUN mg/dL 44* 43* 38*   CREATININE mg/dL 1.65* 1.81* 1.58*   CALCIUM mg/dL 8.9 9.3 9.9     Results from last 7 days   Lab Units 05/15/25  1711   INR  1.07   PTT seconds 27     Lab Results   Component Value Date    HGBA1C 6.4 (H) 02/15/2025     No results found for: \"CKTOTAL\", \"CKMB\", \"CKMBINDEX\", \"TROPONINI\"    12 lead EKG demonstrates AV pacing with appropriate capture    VTE Prophylaxis: VTE covered by:  enoxaparin, Subcutaneous, 40 mg at 05/16/25 0821    and Sequential compression device (Venodyne)     Alisia BUSTILLOS  Cardiology  "

## 2025-05-16 NOTE — PROGRESS NOTES
"Progress Note - Hospitalist   Name: Thomas Horne 80 y.o. male I MRN: [id number]  Unit/Bed#: 4 [place] 419-02 I Date of Admission: 5/15/2025   Date of Service: 5/16/2025 I Hospital Day: 0    Assessment & Plan  Orthostatic hypotension  Patient presents to the ER due to symptomatic hypotension noted at nephrology appointment SBP 80s with lightheadedness and seeing \"black spots\"   Patient is on multiple cardiac medications which is likely source of his hypotension.   Orthostatic VS + in the ER  lying to 92 standing  Continue IV fluids at 75 mL/h monitor volume status closely due to hx of CHF  Repeat orthostatic VS in the AM   Cardiology input appreciated   Chronic diastolic congestive heart failure (HCC)  Wt Readings from Last 3 Encounters:   05/16/25 101 kg (222 lb 3.2 oz)   05/15/25 103 kg (226 lb)   05/08/25 102 kg (224 lb 6.4 oz)     Patient currently examines dry.  Noted to have orthostatic hypotension.   12/13/2024 echocardiogram shows EF 50%.  Moderate to severe aortic stenosis.  Home diuretic: torsemide 20 mg daily which is currently being held  I/O, Daily weights  Mixed hyperlipidemia  Continue statin, Tricor  Primary hypertension  Currently with symptomatic hypotension, orthostatic hypotension  Hold Imdur, Toprol-XL, Demadex and monitor blood pressures  Coronary artery disease involving native coronary artery of native heart without angina pectoris  History of CAD status post CABG  Continue Plavix, statin, Ranexa 1000 mg twice daily  Holding Toprol-XL, Imdur   Type 2 diabetes mellitus with diabetic polyneuropathy, with long-term current use of insulin ([facility])  Lab Results   Component Value Date    HGBA1C 6.4 (H) 02/15/2025       Recent Labs     05/15/25  2115 05/16/25  0717 05/16/25  1124   POCGLU 217* 125 209*     Home regimen: Lantus 15 units nightly, metformin, Januvia, Jardiance  Hold home oral meds, resume at discharge  Continue Lantus 15 units nightly, SSI coverage, Accu-Cheks  Chronic kidney " disease-mineral and bone disorder  Lab Results   Component Value Date    EGFR 38 05/16/2025    EGFR 34 05/15/2025    EGFR 40 05/14/2025    CREATININE 1.65 (H) 05/16/2025    CREATININE 1.81 (H) 05/15/2025    CREATININE 1.58 (H) 05/14/2025     History of CKD stage IIIb -follows with nephrology as outpatient  Baseline creatinine 1.6-1.8, creatinine continues to remain at baseline  Monitor BMP  History of DVT (deep vein thrombosis)  History of DVT no longer on anticoagulation.  S/P placement of cardiac pacemaker  History of sick sinus syndrome status post PPM 12/13/2024.  Nonrheumatic aortic valve stenosis  Prior echo with moderate to severe aortic valve stenosis    VTE Pharmacologic Prophylaxis: VTE Score: 4 Moderate Risk (Score 3-4) - Pharmacological DVT Prophylaxis Ordered: enoxaparin (Lovenox).    Mobility:   Basic Mobility Inpatient Raw Score: 22  JH-HLM Goal: 7: Walk 25 feet or more  JH-HLM Achieved: 7: Walk 25 feet or more  JH-HLM Goal achieved. Continue to encourage appropriate mobility.    Patient Centered Rounds: I performed bedside rounds with nursing staff today.   Discussions with Specialists or Other Care Team Provider: yes - cardiology    Education and Discussions with Family / Patient: Patient declined call to .     Current Length of Stay: 0 day(s)  Current Patient Status: Inpatient   Certification Statement: The patient will continue to require additional inpatient hospital stay due to orthostatic hypotension requiring IV fluids and monitoring of blood pressures  Discharge Plan: Anticipate discharge in 24-48 hrs to home.    Code Status: Level 1 - Full Code    Subjective   Patient states he feels well.  Denies any lightheadedness, dizziness    Objective :  Temp:  [97.2 °F (36.2 °C)-98 °F (36.7 °C)] 98 °F (36.7 °C)  HR:  [65-77] 77  BP: ()/(52-71) 102/54  Resp:  [14-20] 17  SpO2:  [93 %-99 %] 93 %  O2 Device: None (Room air)    Body mass index is 28.53 kg/m².     Input and Output  Summary (last 24 hours):     Intake/Output Summary (Last 24 hours) at 5/16/2025 1219  Last data filed at 5/16/2025 0043  Gross per 24 hour   Intake --   Output 850 ml   Net -850 ml       Physical Exam  Vitals reviewed.   Constitutional:       General: He is not in acute distress.     Appearance: He is well-developed. He is not toxic-appearing.   HENT:      Head: Normocephalic and atraumatic.     Eyes:      Conjunctiva/sclera: Conjunctivae normal.       Cardiovascular:      Rate and Rhythm: Normal rate and regular rhythm.      Heart sounds: Murmur heard.   Pulmonary:      Effort: Pulmonary effort is normal. No respiratory distress.      Breath sounds: Normal breath sounds. No wheezing or rales.   Abdominal:      General: There is no distension.      Palpations: Abdomen is soft.      Tenderness: There is no abdominal tenderness.     Musculoskeletal:      Right lower leg: No edema.      Left lower leg: No edema.     Neurological:      Mental Status: He is alert and oriented to person, place, and time.         Lines/Drains:          Lab Results: I have reviewed the following results:   Results from last 7 days   Lab Units 05/16/25  0445   WBC Thousand/uL 5.96   HEMOGLOBIN g/dL 12.3   HEMATOCRIT % 39.5   PLATELETS Thousands/uL 172   SEGS PCT % 59   LYMPHO PCT % 28   MONO PCT % 9   EOS PCT % 3     Results from last 7 days   Lab Units 05/16/25  0445 05/15/25  1651   SODIUM mmol/L 139 137   POTASSIUM mmol/L 3.7 5.2   CHLORIDE mmol/L 105 101   CO2 mmol/L 24 24   BUN mg/dL 44* 43*   CREATININE mg/dL 1.65* 1.81*   ANION GAP mmol/L 10 12   CALCIUM mg/dL 8.9 9.3   ALBUMIN g/dL  --  4.2   TOTAL BILIRUBIN mg/dL  --  0.57   ALK PHOS U/L  --  39   ALT U/L  --  19   AST U/L  --  37   GLUCOSE RANDOM mg/dL 102 130     Results from last 7 days   Lab Units 05/15/25  1711   INR  1.07     Results from last 7 days   Lab Units 05/16/25  1124 05/16/25  0717 05/15/25  2115   POC GLUCOSE mg/dl 209* 125 217*         Results from last 7 days    Lab Units 05/15/25  1651   LACTIC ACID mmol/L 1.6   PROCALCITONIN ng/ml 0.07       Recent Cultures (last 7 days):   Results from last 7 days   Lab Units 05/15/25  1711 05/15/25  1651   BLOOD CULTURE  Received in Microbiology Lab. Culture in Progress. Received in Microbiology Lab. Culture in Progress.       Imaging Results Review: I reviewed radiology reports from this admission including: chest xray.  Other Study Results Review: No additional pertinent studies reviewed.    Last 24 Hours Medication List:     Current Facility-Administered Medications:     acetaminophen (TYLENOL) tablet 650 mg, Q6H PRN    atorvastatin (LIPITOR) tablet 40 mg, Daily With Dinner    clopidogrel (PLAVIX) tablet 75 mg, Daily    DULoxetine (CYMBALTA) delayed release capsule 30 mg, Daily    enoxaparin (LOVENOX) subcutaneous injection 40 mg, Daily    famotidine (PEPCID) tablet 20 mg, HS    fenofibrate (TRICOR) tablet 145 mg, Daily    ferrous sulfate tablet 325 mg, Daily With Breakfast    insulin glargine (LANTUS) subcutaneous injection 15 Units 0.15 mL, HS    insulin lispro (HumALOG/ADMELOG) 100 units/mL subcutaneous injection 1-6 Units, TID AC **AND** Fingerstick Glucose (POCT), TID AC    insulin lispro (HumALOG/ADMELOG) 100 units/mL subcutaneous injection 1-6 Units, HS    [Held by provider] isosorbide mononitrate (IMDUR) 24 hr tablet 60 mg, Daily    [Held by provider] metoprolol succinate (TOPROL-XL) 24 hr tablet 100 mg, Daily    multi-electrolyte (Plasmalyte-A/Isolyte-S PH 7.4/Normosol-R) IV solution, Continuous    pantoprazole (PROTONIX) EC tablet 40 mg, Daily    pentoxifylline (TRENtal) ER tablet 400 mg, TID With Meals    pregabalin (LYRICA) capsule 150 mg, TID    ranolazine (RANEXA) 12 hr tablet 1,000 mg, BID    [Held by provider] torsemide (DEMADEX) tablet 20 mg, Daily    Administrative Statements   Today, Patient Was Seen By: Oly Erickson DO      **Please Note: This note may have been constructed using a voice recognition  system.**

## 2025-05-16 NOTE — ASSESSMENT & PLAN NOTE
Lab Results   Component Value Date    EGFR 38 05/16/2025    EGFR 34 05/15/2025    EGFR 40 05/14/2025    CREATININE 1.65 (H) 05/16/2025    CREATININE 1.81 (H) 05/15/2025    CREATININE 1.58 (H) 05/14/2025   monitor with IV hydration

## 2025-05-17 ENCOUNTER — APPOINTMENT (INPATIENT)
Dept: NON INVASIVE DIAGNOSTICS | Facility: HOSPITAL | Age: 80
DRG: 312 | End: 2025-05-17
Payer: COMMERCIAL

## 2025-05-17 VITALS
OXYGEN SATURATION: 96 % | TEMPERATURE: 98.1 F | DIASTOLIC BLOOD PRESSURE: 64 MMHG | HEART RATE: 77 BPM | WEIGHT: 222 LBS | RESPIRATION RATE: 16 BRPM | SYSTOLIC BLOOD PRESSURE: 123 MMHG | BODY MASS INDEX: 28.49 KG/M2 | HEIGHT: 74 IN

## 2025-05-17 PROBLEM — I95.1 ORTHOSTATIC HYPOTENSION: Status: RESOLVED | Noted: 2025-05-15 | Resolved: 2025-05-17

## 2025-05-17 LAB
ANION GAP SERPL CALCULATED.3IONS-SCNC: 9 MMOL/L (ref 4–13)
AORTIC ROOT: 4.3 CM
AORTIC VALVE MEAN VELOCITY: 24.5 M/S
AV AREA BY CONTINUOUS VTI: 0.6 CM2
AV AREA PEAK VELOCITY: 0.6 CM2
AV LVOT MEAN GRADIENT: 0 MMHG
AV LVOT PEAK GRADIENT: 1 MMHG
AV MEAN PRESS GRAD SYS DOP V1V2: 27 MMHG
AV ORIFICE AREA US: 0.65 CM2
AV PEAK GRADIENT: 43 MMHG
AV VELOCITY RATIO: 0.16
AV VMAX SYS DOP: 3.26 M/S
BSA FOR ECHO PROCEDURE: 2.27 M2
BUN SERPL-MCNC: 35 MG/DL (ref 5–25)
CALCIUM SERPL-MCNC: 8.9 MG/DL (ref 8.4–10.2)
CHLORIDE SERPL-SCNC: 105 MMOL/L (ref 96–108)
CO2 SERPL-SCNC: 24 MMOL/L (ref 21–32)
CREAT SERPL-MCNC: 1.37 MG/DL (ref 0.6–1.3)
DOP CALC AO VTI: 71.02 CM
DOP CALC LVOT AREA: 4.15 CM2
DOP CALC LVOT CARDIAC INDEX: 1.42 L/MIN/M2
DOP CALC LVOT CARDIAC OUTPUT: 3.23 L/MIN
DOP CALC LVOT DIAMETER: 2.3 CM
DOP CALC LVOT PEAK VEL VTI: 11.06 CM
DOP CALC LVOT PEAK VEL: 0.51 M/S
DOP CALC LVOT STROKE INDEX: 19.8 ML/M2
DOP CALC LVOT STROKE VOLUME: 45.93
E WAVE DECELERATION TIME: 157 MS
E/A RATIO: 2.49
FRACTIONAL SHORTENING: 14 (ref 28–44)
GFR SERPL CREATININE-BSD FRML MDRD: 47 ML/MIN/1.73SQ M
GLUCOSE SERPL-MCNC: 104 MG/DL (ref 65–140)
GLUCOSE SERPL-MCNC: 111 MG/DL (ref 65–140)
GLUCOSE SERPL-MCNC: 203 MG/DL (ref 65–140)
INTERVENTRICULAR SEPTUM IN DIASTOLE (PARASTERNAL SHORT AXIS VIEW): 1.5 CM
INTERVENTRICULAR SEPTUM: 1.5 CM (ref 0.6–1.1)
LAAS-AP2: 32.6 CM2
LAAS-AP4: 30.4 CM2
LEFT ATRIUM SIZE: 5.4 CM
LEFT ATRIUM VOLUME (MOD BIPLANE): 110 ML
LEFT ATRIUM VOLUME INDEX (MOD BIPLANE): 48.5 ML/M2
LEFT INTERNAL DIMENSION IN SYSTOLE: 4.9 CM (ref 2.1–4)
LEFT VENTRICLE DIASTOLIC VOLUME (MOD BIPLANE): 184 ML
LEFT VENTRICLE DIASTOLIC VOLUME INDEX (MOD BIPLANE): 81.1 ML/M2
LEFT VENTRICLE SYSTOLIC VOLUME (MOD BIPLANE): 87 ML
LEFT VENTRICLE SYSTOLIC VOLUME INDEX (MOD BIPLANE): 38.3 ML/M2
LEFT VENTRICULAR INTERNAL DIMENSION IN DIASTOLE: 5.7 CM (ref 3.5–6)
LEFT VENTRICULAR POSTERIOR WALL IN END DIASTOLE: 1.2 CM
LEFT VENTRICULAR STROKE VOLUME: 50 ML
LV EF BIPLANE MOD: 53 %
LV EF US.2D.A4C+ESTIMATED: 67 %
LVSV (TEICH): 50 ML
MV E'TISSUE VEL-SEP: 5 CM/S
MV PEAK A VEL: 0.49 M/S
MV PEAK E VEL: 122 CM/S
MV STENOSIS PRESSURE HALF TIME: 46 MS
MV VALVE AREA P 1/2 METHOD: 4.78
POTASSIUM SERPL-SCNC: 3.6 MMOL/L (ref 3.5–5.3)
RIGHT ATRIUM AREA SYSTOLE A4C: 19.5 CM2
RIGHT VENTRICLE ID DIMENSION: 3.6 CM
SL CV LEFT ATRIUM LENGTH A2C: 7.2 CM
SL CV PED ECHO LEFT VENTRICLE DIASTOLIC VOLUME (MOD BIPLANE) 2D: 163 ML
SL CV PED ECHO LEFT VENTRICLE SYSTOLIC VOLUME (MOD BIPLANE) 2D: 113 ML
SODIUM SERPL-SCNC: 138 MMOL/L (ref 135–147)
TR MAX PG: 34 MMHG
TR PEAK VELOCITY: 2.9 M/S
TRICUSPID ANNULAR PLANE SYSTOLIC EXCURSION: 1.8 CM
TRICUSPID VALVE PEAK REGURGITATION VELOCITY: 2.93 M/S

## 2025-05-17 PROCEDURE — 99239 HOSP IP/OBS DSCHRG MGMT >30: CPT | Performed by: FAMILY MEDICINE

## 2025-05-17 PROCEDURE — 80048 BASIC METABOLIC PNL TOTAL CA: CPT | Performed by: FAMILY MEDICINE

## 2025-05-17 PROCEDURE — 93306 TTE W/DOPPLER COMPLETE: CPT

## 2025-05-17 PROCEDURE — 93306 TTE W/DOPPLER COMPLETE: CPT | Performed by: INTERNAL MEDICINE

## 2025-05-17 PROCEDURE — 99232 SBSQ HOSP IP/OBS MODERATE 35: CPT | Performed by: INTERNAL MEDICINE

## 2025-05-17 PROCEDURE — 82948 REAGENT STRIP/BLOOD GLUCOSE: CPT

## 2025-05-17 RX ORDER — RANOLAZINE 500 MG/1
500 TABLET, EXTENDED RELEASE ORAL 2 TIMES DAILY
Qty: 60 TABLET | Refills: 0 | Status: SHIPPED | OUTPATIENT
Start: 2025-05-17

## 2025-05-17 RX ADMIN — INSULIN LISPRO 2 UNITS: 100 INJECTION, SOLUTION INTRAVENOUS; SUBCUTANEOUS at 12:03

## 2025-05-17 RX ADMIN — SODIUM CHLORIDE, SODIUM GLUCONATE, SODIUM ACETATE, POTASSIUM CHLORIDE, MAGNESIUM CHLORIDE, SODIUM PHOSPHATE, DIBASIC, AND POTASSIUM PHOSPHATE 75 ML/HR: .53; .5; .37; .037; .03; .012; .00082 INJECTION, SOLUTION INTRAVENOUS at 05:35

## 2025-05-17 RX ADMIN — ENOXAPARIN SODIUM 40 MG: 40 INJECTION SUBCUTANEOUS at 08:26

## 2025-05-17 RX ADMIN — PENTOXIFYLLINE 400 MG: 400 TABLET, EXTENDED RELEASE ORAL at 08:26

## 2025-05-17 RX ADMIN — PENTOXIFYLLINE 400 MG: 400 TABLET, EXTENDED RELEASE ORAL at 12:03

## 2025-05-17 RX ADMIN — PANTOPRAZOLE SODIUM 40 MG: 40 TABLET, DELAYED RELEASE ORAL at 08:26

## 2025-05-17 RX ADMIN — CLOPIDOGREL 75 MG: 75 TABLET ORAL at 08:26

## 2025-05-17 RX ADMIN — PREGABALIN 150 MG: 75 CAPSULE ORAL at 08:26

## 2025-05-17 RX ADMIN — DULOXETINE 30 MG: 30 CAPSULE, DELAYED RELEASE ORAL at 08:26

## 2025-05-17 RX ADMIN — FENOFIBRATE 145 MG: 145 TABLET, FILM COATED ORAL at 08:26

## 2025-05-17 RX ADMIN — RANOLAZINE 1000 MG: 500 TABLET, FILM COATED, EXTENDED RELEASE ORAL at 08:25

## 2025-05-17 RX ADMIN — FERROUS SULFATE TAB 325 MG (65 MG ELEMENTAL FE) 325 MG: 325 (65 FE) TAB at 08:25

## 2025-05-17 NOTE — ASSESSMENT & PLAN NOTE
History of CAD status post CABG  Continue Plavix, statin, Ranexa decreased to 500 mg twice daily per cardiology  Continue to hold Toprol-XL, Imdur on discharge

## 2025-05-17 NOTE — DISCHARGE SUMMARY
"Discharge Summary - Hospitalist   Name: Thomas Horne 80 y.o. male I MRN: [id number]  Unit/Bed#: 4 [place] 419-02 I Date of Admission: 5/15/2025   Date of Service: 5/17/2025 I Hospital Day: 1     Assessment & Plan  Orthostatic hypotension (Resolved: 5/17/2025)  Patient presented to the ER due to symptomatic hypotension noted at nephrology appointment SBP 80s with lightheadedness and seeing \"black spots\"   Patient is on multiple cardiac medications which is likely source of his hypotension.   Orthostatic VS + in the ER  lying to 92 standing  Received IV fluids at 75 mL/hr with improvement in blood pressures  orthostatic VS today negative  Outpatient follow-up with primary cardiologist after discharge  Chronic diastolic congestive heart failure (HCC)  Wt Readings from Last 3 Encounters:   05/17/25 101 kg (222 lb)   05/15/25 103 kg (226 lb)   05/08/25 102 kg (224 lb 6.4 oz)     Patient currently examines dry.  Noted to have orthostatic hypotension.   Echo on this admission as noted below  Home diuretic: torsemide 20 mg daily which will be held even on discharge  Mixed hyperlipidemia  Continue statin, Tricor.  Primary hypertension  Currently with symptomatic hypotension, orthostatic hypotension  Continue to hold Imdur, Toprol-XL, Demadex even on discharge per cardiology  Patient advised to check his blood pressures at home and follow-up with cardiology to see if his medications need to be adjusted further  Severe aortic valve stenosis  Prior echo with moderate to severe aortic valve stenosis with EF of 50%  Echo on this admission with EF of 45 to 50% with severely abnormal diastolic function.  Severe aortic valve stenosis noted.  Echo results discussed with patient  Discussed with cardiology and referral to cardiothoracic surgery made on discharge for evaluation for TAVR  Coronary artery disease involving native coronary artery of native heart without angina pectoris  History of CAD status post CABG  Continue " Plavix, statin, Ranexa decreased to 500 mg twice daily per cardiology  Continue to hold Toprol-XL, Imdur on discharge  Type 2 diabetes mellitus with diabetic polyneuropathy, with long-term current use of insulin ([facility])  Lab Results   Component Value Date    HGBA1C 6.4 (H) 02/15/2025       Recent Labs     05/16/25  1619 05/16/25  2058 05/17/25  0728 05/17/25  1109   POCGLU 114 125 111 203*     Home regimen: Lantus 15 units nightly, metformin, Januvia, Jardiance  Held home oral meds while here, resume at discharge  Continue Lantus 15 units nightly  Outpatient follow-up with PCP  Chronic kidney disease-mineral and bone disorder  Lab Results   Component Value Date    EGFR 47 05/17/2025    EGFR 38 05/16/2025    EGFR 34 05/15/2025    CREATININE 1.37 (H) 05/17/2025    CREATININE 1.65 (H) 05/16/2025    CREATININE 1.81 (H) 05/15/2025     History of CKD stage IIIb -follows with nephrology as outpatient  Baseline creatinine 1.6-1.8, creatinine continues to remain at baseline  History of DVT (deep vein thrombosis)  History of DVT no longer on anticoagulation  S/P placement of cardiac pacemaker  History of sick sinus syndrome status post PPM 12/13/2024     Medical Problems       Resolved Problems  Date Reviewed: 4/23/2025          Resolved    * (Principal) Orthostatic hypotension 5/17/2025     Resolved by  Oly Erickson DO        Discharging Physician / Practitioner: Oly Erickson DO  PCP: Frank Lombardi, DO  Admission Date:   Admission Orders (From admission, onward)       Ordered        05/16/25 1432  INPATIENT ADMISSION  Once            05/15/25 1927  Place in Observation  Once                          Discharge Date: 05/17/25    Consultations During Hospital Stay:  Cardiology    Procedures Performed:   TTE    Significant Findings / Test Results:   See above    Incidental Findings:   None    Test Results Pending at Discharge (will require follow up):   None     Outpatient Tests Requested:  None    Complications:  "None    Reason for Admission: Orthostatic hypotension    Hospital Course:   Thomas Horne is a 80 y.o. male patient who originally presented to the hospital on 5/15/2025 due to Low blood pressure with lightheadedness and seeing black spots at outpatient nephrology appointment on day of admission.  At systolic blood pressure was reportedly in the 80s.  Patient received fluid bolus in the ER but orthostatic vital signs continue to remain positive.  Patient was admitted and seen by cardiology.  Antihypertensives were held and will continue to hold on discharge.  Patient was hydrated with IV fluids and blood pressures have improved.  Cleared by cardiology prior to discharge.  Discharge plan including follow-ups discussed in details with patient.  Offered to call family on multiple occasions however patient declined.  States he has been keeping them updated    Please see above list of diagnoses and related plan for additional information.     Condition at Discharge: stable    Discharge Day Visit / Exam:   Subjective: Patient denies any dizziness, lightheadedness even with ambulation.  Feels ready to go home  Vitals: Blood Pressure: 123/64 (05/17/25 1249)  Pulse: 77 (05/17/25 1249)  Temperature: 98.1 °F (36.7 °C) (05/17/25 0817)  Temp Source: Oral (05/17/25 0246)  Respirations: 16 (05/17/25 0246)  Height: 6' 2\" (188 cm) (05/17/25 1000)  Weight - Scale: 101 kg (222 lb) (05/17/25 1000)  SpO2: 96 % (05/17/25 1249)  Physical Exam  Vitals reviewed.   Constitutional:       General: He is not in acute distress.     Appearance: He is not ill-appearing.   HENT:      Head: Normocephalic and atraumatic.     Eyes:      General:         Right eye: No discharge.         Left eye: No discharge.       Cardiovascular:      Rate and Rhythm: Normal rate and regular rhythm.      Heart sounds: Murmur heard.   Pulmonary:      Effort: Pulmonary effort is normal. No respiratory distress.      Breath sounds: Normal breath sounds. No wheezing or " rales.   Abdominal:      General: Bowel sounds are normal. There is no distension.      Palpations: Abdomen is soft.      Tenderness: There is no abdominal tenderness.     Musculoskeletal:      Right lower leg: No edema.      Left lower leg: No edema.     Neurological:      Mental Status: He is alert and oriented to person, place, and time.         Discussion with Family: Patient declined call to .     Discharge instructions/Information to patient and family:   See after visit summary for information provided to patient and family.      Provisions for Follow-Up Care:  See after visit summary for information related to follow-up care and any pertinent home health orders.      Mobility at time of Discharge:   Basic Mobility Inpatient Raw Score: 24  JH-HLM Goal: 8: Walk 250 feet or more  JH-HLM Achieved: 7: Walk 25 feet or more  HLM Goal achieved. Continue to encourage appropriate mobility.     Disposition:   Home    Planned Readmission: No    Discharge Medications:  See after visit summary for reconciled discharge medications provided to patient and/or family.      Administrative Statements   Discharge Statement:  I have spent a total time of > 30 minutes in caring for this patient on the day of the visit/encounter. >30 minutes of time was spent on: Patient and family education, Impressions, Counseling / Coordination of care, Documenting in the medical record, Reviewing / ordering tests, medicine, procedures  , and Communicating with other healthcare professionals .    **Please Note: This note may have been constructed using a voice recognition system**

## 2025-05-17 NOTE — ASSESSMENT & PLAN NOTE
12/13/2024 TTE: aortic valve with moderate calcification and severely reduced mobility with moderate to severe stenosis, peak aortic valve gradient was 48 mmHg with mean gradient of 29 mm Mercury with calculated JEFF of 1.05 cm².  5/17/2025 TTE: pending

## 2025-05-17 NOTE — PROGRESS NOTES
Progress Note - Cardiology   Name: Thomas Horne 80 y.o. male I MRN: [id number]  Unit/Bed#: 4 [place] 419-02 I Date of Admission: 5/15/2025   Date of Service: 5/17/2025 I Hospital Day: 1    Assessment & Plan  Orthostatic hypotension  patient presents with recent lightheadedness, dizziness and positive orthostatic vital signs  patient received IV fluids for 24 hours and holding all his antihypertensive medications  5/17/2025 orthostatic vital signs at this time are negative, but systolic blood pressures ranging from 113 to 116 with position changes  concern for worsening of his aortic stenosis, echo is currently pending  Nonrheumatic aortic valve stenosis  12/13/2024 TTE: aortic valve with moderate calcification and severely reduced mobility with moderate to severe stenosis, peak aortic valve gradient was 48 mmHg with mean gradient of 29 mm Mercury with calculated JEFF of 1.05 cm².  5/17/2025 TTE: pending  Primary hypertension  documented history  now orthostatic  antihypertensives currently on hold  continue to monitor  Coronary artery disease involving native coronary artery of native heart without angina pectoris  history of CABG times three in 2002 and PCI with drug-eluting stent to distal left circ in 2021  10/19/2022 LHC: triple vessel disease with 100% occluded right coronary artery, 100% occluded mid LAD. SVG to RCA was also hundred percent occluded but RCA had collaterals from left circulation. [place] to LAD was widely patent and no flow was noted in the SVG to the circumflex  continue aspirin 81 mg once a day  continue Ranexa 1000 mg BID  Mixed hyperlipidemia  continue Lipitor 40 mg daily  Type 2 diabetes mellitus with diabetic polyneuropathy, with long-term current use of insulin ([facility])  Lab Results   Component Value Date    HGBA1C 6.4 (H) 02/15/2025       Recent Labs     05/16/25  1124 05/16/25  1619 05/16/25  2058 05/17/25  0728   POCGLU 209* 114 125 111       Blood Sugar Average: Last 72 hrs:  (P)  [id number]  managed per primary team  Chronic kidney disease-mineral and bone disorder  Lab Results   Component Value Date    EGFR 47 05/17/2025    EGFR 38 05/16/2025    EGFR 34 05/15/2025    CREATININE 1.37 (H) 05/17/2025    CREATININE 1.65 (H) 05/16/2025    CREATININE 1.81 (H) 05/15/2025   monitor with IV hydration  Chronic diastolic congestive heart failure (HCC)  Wt Readings from Last 3 Encounters:   05/17/25 101 kg (222 lb 9.6 oz)   05/15/25 103 kg (226 lb)   05/08/25 102 kg (224 lb 6.4 oz)   patient examines dry on admission  5/17/2025 patient improved after 24 hours of IV hydration  antihypertensives and diuretics remain on hold due to soft blood pressures but negative ortho stasis  concern for worsening aortic stenosis, 2D echocardiogram pending  continue to monitor I and O, weights and labs  S/P placement of cardiac pacemaker  12/16/2024 patient underwent implantation of [employer] dual chambered pacemaker  most recent interrogation noted normal function    Subjective   Chief Complaint: Patient seen and examined. Orthostatic vital signs today were negative but systolic's were in the low one teens. Patient denies any lightheadedness with position changes, standing or walking around the room.      Objective :  Temp:  [97.6 °F (36.4 °C)-98.2 °F (36.8 °C)] 98.1 °F (36.7 °C)  HR:  [67-77] 67  BP: (102-118)/(54-68) 116/68  Resp:  [16-17] 16  SpO2:  [93 %-97 %] 95 %  O2 Device: None (Room air)  Orthostatic Blood Pressures      Flowsheet Row Most Recent Value   Blood Pressure 116/68 filed at 05/17/2025 0817   Patient Position - Orthostatic VS Standing - Orthostatic VS filed at 05/17/2025 0804          First Weight: Weight - Scale: 101 kg (222 lb 3.2 oz) (05/16/25 0540)  Vitals:    05/17/25 0645 05/17/25 0648   Weight: 101 kg (222 lb 9.6 oz) 101 kg (222 lb 9.6 oz)     Physical Exam  Vitals and nursing note reviewed.   Constitutional:       General: He is not in acute distress.     Appearance: Normal  "appearance. He is normal weight.   HENT:      Right Ear: External ear normal.      Left Ear: External ear normal.     Eyes:      General:         Right eye: No discharge.         Left eye: No discharge.       Cardiovascular:      Rate and Rhythm: Normal rate and regular rhythm.      Pulses: Normal pulses.      Heart sounds: Murmur heard.   Pulmonary:      Effort: Pulmonary effort is normal.      Breath sounds: Normal breath sounds. Stridor present.   Abdominal:      Palpations: Abdomen is soft. There is mass.     Musculoskeletal:      Right lower leg: No edema.      Left lower leg: No edema.     Skin:     General: Skin is warm and dry.      Capillary Refill: Capillary refill takes less than 2 seconds.     Neurological:      Mental Status: He is alert and oriented to person, place, and time. Mental status is at baseline.     Psychiatric:         Mood and Affect: Mood normal.           Lab Results: I have reviewed the following results:  Results from last 7 days   Lab Units 05/16/25  0445 05/15/25  1651 05/14/25  1625   WBC Thousand/uL 5.96 6.80 7.68   HEMOGLOBIN g/dL 12.3 13.4 14.6   HEMATOCRIT % 39.5 43.4 47.0   PLATELETS Thousands/uL 172 216 225     Results from last 7 days   Lab Units 05/17/25  0513 05/16/25  0445 05/15/25  1651   POTASSIUM mmol/L 3.6 3.7 5.2   CHLORIDE mmol/L 105 105 101   CO2 mmol/L 24 24 24   BUN mg/dL 35* 44* 43*   CREATININE mg/dL 1.37* 1.65* 1.81*   CALCIUM mg/dL 8.9 8.9 9.3     Results from last 7 days   Lab Units 05/15/25  1711   INR  1.07   PTT seconds 27     Lab Results   Component Value Date    HGBA1C 6.4 (H) 02/15/2025     No results found for: \"CKTOTAL\", \"CKMB\", \"CKMBINDEX\", \"TROPONINI\"    Telemetry demonstrates 100% atrial and ventricular pacing with appropriate capture    VTE Pharmacologic Prophylaxis: VTE covered by:  enoxaparin, Subcutaneous, 40 mg at 05/17/25 0826     VTE Mechanical Prophylaxis: sequential compression device    Alisia BUSTILLOS  Cardiology  "

## 2025-05-17 NOTE — ASSESSMENT & PLAN NOTE
Lab Results   Component Value Date    HGBA1C 6.4 (H) 02/15/2025       Recent Labs     05/16/25  1619 05/16/25 2058 05/17/25  0728 05/17/25  1109   POCGLU 114 125 111 203*     Home regimen: Lantus 15 units nightly, metformin, Januvia, Jardiance  Held home oral meds while here, resume at discharge  Continue Lantus 15 units nightly  Outpatient follow-up with PCP

## 2025-05-17 NOTE — ASSESSMENT & PLAN NOTE
Lab Results   Component Value Date    EGFR 47 05/17/2025    EGFR 38 05/16/2025    EGFR 34 05/15/2025    CREATININE 1.37 (H) 05/17/2025    CREATININE 1.65 (H) 05/16/2025    CREATININE 1.81 (H) 05/15/2025     History of CKD stage IIIb -follows with nephrology as outpatient  Baseline creatinine 1.6-1.8, creatinine continues to remain at baseline

## 2025-05-17 NOTE — ASSESSMENT & PLAN NOTE
patient presents with recent lightheadedness, dizziness and positive orthostatic vital signs  patient received IV fluids for 24 hours and holding all his antihypertensive medications  5/17/2025 orthostatic vital signs at this time are negative, but systolic blood pressures ranging from 113 to 116 with position changes  concern for worsening of his aortic stenosis, echo is currently pending

## 2025-05-17 NOTE — ASSESSMENT & PLAN NOTE
Lab Results   Component Value Date    HGBA1C 6.4 (H) 02/15/2025       Recent Labs     05/16/25  1124 05/16/25  1619 05/16/25 2058 05/17/25  0728   POCGLU 209* 114 125 111       Blood Sugar Average: Last 72 hrs:  (P) [id number]  managed per primary team

## 2025-05-17 NOTE — ASSESSMENT & PLAN NOTE
Prior echo with moderate to severe aortic valve stenosis with EF of 50%  Echo on this admission with EF of 45 to 50% with severely abnormal diastolic function.  Severe aortic valve stenosis noted.  Echo results discussed with patient  Discussed with cardiology and referral to cardiothoracic surgery made on discharge for evaluation for TAVR

## 2025-05-17 NOTE — ASSESSMENT & PLAN NOTE
Lab Results   Component Value Date    EGFR 47 05/17/2025    EGFR 38 05/16/2025    EGFR 34 05/15/2025    CREATININE 1.37 (H) 05/17/2025    CREATININE 1.65 (H) 05/16/2025    CREATININE 1.81 (H) 05/15/2025   monitor with IV hydration

## 2025-05-17 NOTE — ASSESSMENT & PLAN NOTE
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 SVG to RCA was also hundred percent occluded but RCA had collaterals from left circulation. [place] to LAD was widely patent and no flow was noted in the SVG to the circumflex  continue aspirin 81 mg once a day  continue Ranexa 1000 mg BID

## 2025-05-17 NOTE — ASSESSMENT & PLAN NOTE
Wt Readings from Last 3 Encounters:   05/17/25 101 kg (222 lb)   05/15/25 103 kg (226 lb)   05/08/25 102 kg (224 lb 6.4 oz)     Patient currently examines dry.  Noted to have orthostatic hypotension.   Echo on this admission as noted below  Home diuretic: torsemide 20 mg daily which will be held even on discharge

## 2025-05-17 NOTE — ASSESSMENT & PLAN NOTE
Currently with symptomatic hypotension, orthostatic hypotension  Continue to hold Imdur, Toprol-XL, Demadex even on discharge per cardiology  Patient advised to check his blood pressures at home and follow-up with cardiology to see if his medications need to be adjusted further

## 2025-05-17 NOTE — PLAN OF CARE
Problem: Potential for Falls  Goal: Patient will remain free of falls  Description: INTERVENTIONS:  - Educate patient/family on patient safety including physical limitations  - Instruct patient to call for assistance with activity   - Consider consulting OT/PT to assist with strengthening/mobility based on AM PAC & JH-HLM score  - Consult OT/PT to assist with strengthening/mobility   - Keep Call bell within reach  - Keep bed low and locked with side rails adjusted as appropriate  - Keep care items and personal belongings within reach  - Initiate and maintain comfort rounds  - Make Fall Risk Sign visible to staff  - Offer Toileting every 2 Hours, in advance of need  - Initiate/Maintain bed alarm  - Obtain necessary fall risk management equipment: bed alarm  - Apply yellow socks and bracelet for high fall risk patients  - Consider moving patient to room near nurses station  Outcome: Progressing     Problem: CARDIOVASCULAR - ADULT  Goal: Maintains optimal cardiac output and hemodynamic stability  Description: INTERVENTIONS:  - Monitor I/O, vital signs and rhythm  - Monitor for S/S and trends of decreased cardiac output  - Administer and titrate ordered vasoactive medications to optimize hemodynamic stability  - Assess quality of pulses, skin color and temperature  - Assess for signs of decreased coronary artery perfusion  - Instruct patient to report change in severity of symptoms  Outcome: Progressing  Goal: Absence of cardiac dysrhythmias or at baseline rhythm  Description: INTERVENTIONS:  - Continuous cardiac monitoring, vital signs, obtain 12 lead EKG if ordered  - Administer antiarrhythmic and heart rate control medications as ordered  - Monitor electrolytes and administer replacement therapy as ordered  Outcome: Progressing     Problem: DISCHARGE PLANNING  Goal: Discharge to home or other facility with appropriate resources  Description: INTERVENTIONS:  - Identify barriers to discharge w/patient and  caregiver  - Arrange for needed discharge resources and transportation as appropriate  - Identify discharge learning needs (meds, wound care, etc.)  - Arrange for interpretive services to assist at discharge as needed  - Refer to Case Management Department for coordinating discharge planning if the patient needs post-hospital services based on physician/advanced practitioner order or complex needs related to functional status, cognitive ability, or social support system  Outcome: Progressing     Problem: Knowledge Deficit  Goal: Patient/family/caregiver demonstrates understanding of disease process, treatment plan, medications, and discharge instructions  Description: Complete learning assessment and assess knowledge base.  Interventions:  - Provide teaching at level of understanding  - Provide teaching via preferred learning methods  Outcome: Progressing

## 2025-05-17 NOTE — NURSING NOTE
Discharge instructions given to patient. No questions at this time. IV removed. All belongings at side.

## 2025-05-17 NOTE — ASSESSMENT & PLAN NOTE
"Patient presented to the ER due to symptomatic hypotension noted at nephrology appointment SBP 80s with lightheadedness and seeing \"black spots\"   Patient is on multiple cardiac medications which is likely source of his hypotension.   Orthostatic VS + in the ER  lying to 92 standing  Received IV fluids at 75 mL/hr with improvement in blood pressures  orthostatic VS today negative  Outpatient follow-up with primary cardiologist after discharge  "

## 2025-05-17 NOTE — UTILIZATION REVIEW
SEE INITIAL REVIEW AT BOTTOM    Date: 05-17-24  Day 3: Has surpassed a 2nd midnight with active treatments and services.    Continued Stay Review    Date: 05-17-25                          Current Patient Class: Inpatient  Current Level of Care: medical    HPI:80 y.o. male initially admitted on 05-15-25   Current Diagnosis:Orthostatic hypotension     Assessment/Plan: Continue IVF hold antihypertensive medications Continue Plavix, atorvastatin, Tricor, ASA Ranexa, Lipitor accu checks with SSI telemetry 5/17/2025 orthostatic vital signs at this time are negative, but systolic blood pressures ranging from 113 to 116 with position changes concern for worsening of his aortic stenosis, echo is currently pending monitor orthostatics On exam (+) murmur stridor       Medications:   Scheduled Medications:  atorvastatin, 40 mg, Oral, Daily With Dinner  clopidogrel, 75 mg, Oral, Daily  DULoxetine, 30 mg, Oral, Daily  enoxaparin, 40 mg, Subcutaneous, Daily  famotidine, 20 mg, Oral, HS  fenofibrate, 145 mg, Oral, Daily  ferrous sulfate, 325 mg, Oral, Daily With Breakfast  insulin glargine, 15 Units, Subcutaneous, HS  insulin lispro, 1-6 Units, Subcutaneous, TID AC  insulin lispro, 1-6 Units, Subcutaneous, HS  [Held by provider] isosorbide mononitrate, 60 mg, Oral, Daily  [Held by provider] metoprolol succinate, 100 mg, Oral, Daily  pantoprazole, 40 mg, Oral, Daily  pentoxifylline, 400 mg, Oral, TID With Meals  pregabalin, 150 mg, Oral, TID  ranolazine, 1,000 mg, Oral, BID  [Held by provider] torsemide, 20 mg, Oral, Daily    Continuous IV Infusion  multi-electrolyte (Plasmalyte-A/Isolyte-S PH 7.4/Normosol-R) IV solution      PRN Meds:  acetaminophen, 650 mg, Oral, Q6H PRN      Discharge Plan: TBD    Vital Signs (last 3 days)       Date/Time Temp Pulse Resp BP MAP (mmHg) SpO2 O2 Device Patient Position - Orthostatic VS Pain    05/17/25 12:49:19 -- 77 -- 123/64 84 96 % -- Standing - Orthostatic VS --    05/17/25 12:48:04 -- 79 --  122/64 83 94 % -- Sitting - Orthostatic VS --    05/17/25 12:46:46 -- -- -- 103/57 72 -- -- Lying - Orthostatic VS --    05/17/25 1000 -- 67 -- 116/68 -- -- -- -- --    05/17/25 08:17:22 98.1 °F (36.7 °C) 67 -- 116/68 84 95 % -- -- --    05/17/25 08:04:03 -- 75 -- 115/65 82 94 % None (Room air) Standing - Orthostatic VS No Pain    05/17/25 0803 -- -- -- 115/62 -- -- -- Sitting - Orthostatic VS --    05/17/25 07:57:54 -- 70 -- 113/64 80 94 % -- Lying - Orthostatic VS --    05/17/25 02:46:51 97.6 °F (36.4 °C) 72 16 116/65 82 95 % None (Room air) Lying --    05/17/25 0133 -- -- -- -- -- 95 % None (Room air) -- No Pain    05/16/25 2233 98.2 °F (36.8 °C) 69 -- 118/65 83 96 % -- -- --    05/16/25 22:32:50 98.2 °F (36.8 °C) 69 -- 118/65 83 97 % -- -- --    05/16/25 19:17:53 97.7 °F (36.5 °C) 72 -- 113/63 80 95 % -- -- --    05/16/25 16:09:45 98.1 °F (36.7 °C) 73 -- 112/64 80 94 % -- -- --    05/16/25 16:09:30 98.1 °F (36.7 °C) 74 -- 112/64 80 93 % -- -- --    05/16/25 11:43:39 98.1 °F (36.7 °C) 77 17 102/54 70 93 % -- Standing - Orthostatic VS --    05/16/25 11:38:27 -- 75 -- 104/54 71 94 % -- -- --    05/16/25 11:37:47 98 °F (36.7 °C) 69 17 104/54 71 -- -- Sitting - Orthostatic VS --    05/16/25 0800 -- -- -- -- -- 97 % None (Room air) -- No Pain    05/16/25 07:20:16 98 °F (36.7 °C) 67 18 105/53 70 96 % -- Lying - Orthostatic VS --    05/16/25 0700 -- 77 17 102/54 -- -- -- Lying - Orthostatic VS --    05/16/25 02:30:55 -- 70 14 107/53 71 95 % -- -- --    05/15/25 22:05:47 97.9 °F (36.6 °C) 70 14 109/53 72 94 % -- -- --    05/15/25 20:36:20 97.2 °F (36.2 °C) 65 20 122/71 88 97 % -- -- --    05/15/25 2030 -- -- -- -- -- -- -- -- No Pain    05/15/25 1915 -- 69 17 126/68 93 98 % None (Room air) -- --    05/15/25 1900 -- 76 -- 92/55 -- -- -- Standing - Orthostatic VS --    05/15/25 1859 -- 74 -- 109/65 -- -- -- Sitting - Orthostatic VS --    05/15/25 1858 -- 72 -- 114/69 -- -- -- Lying - Orthostatic VS --    05/15/25 1715 --  70 17 97/57 72 -- -- -- --    05/15/25 1645 -- 70 20 102/59 75 -- -- -- --    05/15/25 1641 97.9 °F (36.6 °C) 71 20 -- -- 98 % None (Room air) -- --          Weight (last 2 days)       Date/Time Weight    05/17/25 1000 101 (222)    05/17/25 0648 101 (222.6)    05/17/25 0645 101 (222.6)    05/16/25 0540 101 (222.2)            Pertinent Labs/Diagnostic Results:   Radiology:  XR chest 1 view portable   Final Interpretation by Soraya Trinidad MD (05/15 2572)      No acute cardiopulmonary disease.            Workstation performed: [id number]           Cardiology:  Echo complete w/ contrast if indicated   Final Result by Naun Bell MD (05/17 1106)        Left Ventricle: Left ventricular cavity size is upper normal. Wall    thickness is increased. There is moderate concentric hypertrophy. The left    ventricular ejection fraction is 45-50% visually and 53% on biplane    measurement.  Definity was used to visualize LV cavity.  Systolic function    is low normal. There is mild global hypokinesis. Diastolic function is    severely abnormal, consistent with ungraded restrictive relaxation.     IVS: There is abnormal septal motion consistent with right ventricular    pacing.     Right Ventricle: Systolic function is normal.     Left Atrium: The atrium is severely dilated (>48 mL/m2).     Right Atrium: The atrium is mildly dilated.     Aortic Valve: The aortic valve is trileaflet. The leaflets are    moderately thickened. The leaflets are moderately calcified. There is    severely reduced mobility. There is mild regurgitation. There is severe    stenosis. The aortic valve mean gradient is 27 mmHg, peak gradient is 43    mmHg. The dimensionless velocity index is 0.16. The aortic valve area is    0.65 cm2.     Mitral Valve: There is mild annular calcification. There is moderate    regurgitation.     Tricuspid Valve: There is mild to moderate regurgitation. The right    ventricular systolic pressure is mildly elevated at 39  mmHg.     Prior TTE study available for comparison. Prior study date: 12/13/2024.    Changes noted when compared to prior study. Changes include: Degree of    aortic stenosis has increased to severe.  Possible slight decrease in LV    systolic function/EF.  Slight increase in mitral regurgitation and LA    size..         ECG 12 lead   Final Result by Naun Bell MD (05/16 0903)   AV dual-paced rhythm with intrinsic complexes   Abnormal ECG   Confirmed by Naun Bell ([id number]) on 5/16/2025 9:03:13 AM      ECG 12 lead   Final Result by Naun Bell MD (05/16 0903)   AV dual-paced rhythm   Abnormal ECG   Confirmed by Naun Bell ([id number]) on 5/16/2025 9:03:06 AM      ECG 12 lead   Final Result by Naun Bell MD (05/16 0902)   AV dual-paced rhythm   Abnormal ECG   Confirmed by Naun Bell ([id number]) on 5/16/2025 9:02:34 AM      ECG 12 lead   Final Result by Naun Bell MD (05/16 0902)   AV dual-paced rhythm   Abnormal ECG   Confirmed by Naun Bell ([id number]) on 5/16/2025 9:02:20 AM        GI:  No orders to display           Results from last 7 days   Lab Units 05/16/25  0445 05/15/25  1651 05/14/25  1625   WBC Thousand/uL 5.96 6.80 7.68   HEMOGLOBIN g/dL 12.3 13.4 14.6   HEMATOCRIT % 39.5 43.4 47.0   PLATELETS Thousands/uL 172 216 225   TOTAL NEUT ABS Thousands/µL 3.56 4.60  --          Results from last 7 days   Lab Units 05/17/25  0513 05/16/25  0445 05/15/25  1651 05/14/25  1625   SODIUM mmol/L 138 139 137 141   POTASSIUM mmol/L 3.6 3.7 5.2 4.2   CHLORIDE mmol/L 105 105 101 101   CO2 mmol/L 24 24 24 27   ANION GAP mmol/L 9 10 12 13   BUN mg/dL 35* 44* 43* 38*   CREATININE mg/dL 1.37* 1.65* 1.81* 1.58*   EGFR ml/min/1.73sq m 47 38 34 40   CALCIUM mg/dL 8.9 8.9 9.3 9.9   MAGNESIUM mg/dL  --  2.1  --  2.0   PHOSPHORUS mg/dL  --   --   --  4.0     Results from last 7 days   Lab Units 05/15/25  1651 05/14/25  1625   AST U/L 37  --    ALT U/L 19  --    ALK PHOS U/L 39  --    TOTAL PROTEIN g/dL 7.8  --    ALBUMIN g/dL 4.2 4.6  "  TOTAL BILIRUBIN mg/dL 0.57  --      Results from last 7 days   Lab Units 05/17/25  1109 05/17/25  0728 05/16/25  2058 05/16/25  1619 05/16/25  1124 05/16/25  0717 05/15/25  2115   POC GLUCOSE mg/dl 203* 111 125 114 209* 125 217*     Results from last 7 days   Lab Units 05/17/25  0513 05/16/25  0445 05/15/25  1651 05/14/25  1625   GLUCOSE RANDOM mg/dL 104 102 130 119             No results found for: \"BETA-HYDROXYBUTYRATE\"                           Results from last 7 days   Lab Units 05/15/25  1711   PROTIME seconds 14.4   INR  1.07   PTT seconds 27     Results from last 7 days   Lab Units 05/15/25  1651   TSH 3RD GENERATON uIU/mL 2.032     Results from last 7 days   Lab Units 05/15/25  1651   PROCALCITONIN ng/ml 0.07     Results from last 7 days   Lab Units 05/15/25  1651   LACTIC ACID mmol/L 1.6                 Results from last 7 days   Lab Units 05/14/25  1625   FERRITIN ng/mL 48   IRON SATURATION % 10*   IRON ug/dL 45*   TIBC ug/dL 436.8     Results from last 7 days   Lab Units 05/14/25  1625   TRANSFERRIN mg/dL 312                             Results from last 7 days   Lab Units 05/16/25  0800 05/14/25  1625   CLARITY UA  Clear  --    COLOR UA  Yellow  --    SPEC GRAV UA  1.020  --    PH UA  6.5  --    GLUCOSE UA mg/dl 1000 (1%)*  --    KETONES UA mg/dl Negative  --    BLOOD UA  Negative  --    PROTEIN UA mg/dl Negative  --    NITRITE UA  Negative  --    BILIRUBIN UA  Negative  --    UROBILINOGEN UA (BE) mg/dl <2.0  --    LEUKOCYTES UA  Negative  --    CREATININE UR mg/dL  --  25.2                                 Results from last 7 days   Lab Units 05/15/25  1711 05/15/25  1651   BLOOD CULTURE  No Growth at 24 hrs. No Growth at 24 hrs.                   Network Utilization Review Department  ATTENTION: Please call with any questions or concerns to [phone] and carefully listen to the prompts so that you are directed to the right person. All voicemails are confidential.   For Discharge needs, contact " Care Management DC Support Team at [phone] opt. 2  Send all requests for admission clinical reviews, approved or denied determinations and any other requests to dedicated fax number below belonging to the campus where the patient is receiving treatment. List of dedicated fax numbers for the Facilities:  FACILITY NAME UR FAX NUMBER   ADMISSION DENIALS (Administrative/Medical Necessity) [phone]   DISCHARGE SUPPORT TEAM (NETWORK) [phone]   PARENT CHILD HEALTH (Maternity/NICU/Pediatrics) [phone]   [facility] [phone]   [facility] [phone]   [facility] [phone]   [facility] [phone]   [facility] [phone]   [facility] [phone]   [facility] [phone]   [facility] [phone]   [facility] [phone]   [facility] [phone]   [facility] [phone]   [facility] [phone]

## 2025-05-17 NOTE — DISCHARGE INSTR - AVS FIRST PAGE
As discussed check your blood pressures at home 1-2 times a day and write it down and a book.  Follow-up with your cardiologist with your blood pressure readings to see if your medications need to be adjusted further

## 2025-05-17 NOTE — ASSESSMENT & PLAN NOTE
Wt Readings from Last 3 Encounters:   05/17/25 101 kg (222 lb 9.6 oz)   05/15/25 103 kg (226 lb)   05/08/25 102 kg (224 lb 6.4 oz)   patient examines dry on admission  5/17/2025 patient improved after 24 hours of IV hydration  antihypertensives and diuretics remain on hold due to soft blood pressures but negative ortho stasis  concern for worsening aortic stenosis, 2D echocardiogram pending  continue to monitor I and O, weights and labs

## 2025-05-18 DIAGNOSIS — I35.0 NONRHEUMATIC AORTIC VALVE STENOSIS: Primary | ICD-10-CM

## 2025-05-19 ENCOUNTER — TELEPHONE (OUTPATIENT)
Age: 80
End: 2025-05-19

## 2025-05-19 ENCOUNTER — TRANSITIONAL CARE MANAGEMENT (OUTPATIENT)
Dept: FAMILY MEDICINE CLINIC | Facility: CLINIC | Age: 80
End: 2025-05-19

## 2025-05-19 ENCOUNTER — TELEPHONE (OUTPATIENT)
Dept: NEPHROLOGY | Facility: CLINIC | Age: 80
End: 2025-05-19

## 2025-05-19 NOTE — TELEPHONE ENCOUNTER
Phone call from patient returning call. Appt scheduled for 10/3/25. Patient aware labs are needed prior to appt.

## 2025-05-19 NOTE — UTILIZATION REVIEW
NOTIFICATION OF ADMISSION DISCHARGE   This is a Notification of Discharge from [facility]. Please be advised that this patient has been discharge from our facility. Below you will find the admission and discharge date and time including the patient’s disposition.   UTILIZATION REVIEW CONTACT:  Utilization Review Assistants  Network Utilization Review Department  Phone: [phone] x carefully listen to the prompts. All voicemails are confidential.  Email: [email or web address]     ADMISSION INFORMATION  PRESENTATION DATE: 5/15/2025  4:31 PM  OBERVATION ADMISSION DATE: 05/15/2025 1927  INPATIENT ADMISSION DATE: 5/16/25  2:32 PM   DISCHARGE DATE: 5/17/2025  4:10 PM   DISPOSITION:Home/Self Care    Network Utilization Review Department  ATTENTION: Please call with any questions or concerns to [phone] and carefully listen to the prompts so that you are directed to the right person. All voicemails are confidential.   For Discharge needs, contact Care Management DC Support Team at [phone] opt. 2  Send all requests for admission clinical reviews, approved or denied determinations and any other requests to dedicated fax number below belonging to the campus where the patient is receiving treatment. List of dedicated fax numbers for the Facilities:  FACILITY NAME UR FAX NUMBER   ADMISSION DENIALS (Administrative/Medical Necessity) [phone]   DISCHARGE SUPPORT TEAM ([facility]) [phone]   PARENT CHILD HEALTH (Maternity/NICU/Pediatrics) [phone]   [facility] [phone]   [facility] [phone]   [facility] [phone]   [facility] [phone]   [facility] [phone]   [facility] [phone]   [facility] [phone]   [facility] [phone]    [facility] [phone]   Critical access hospital [phone]   [facility] [phone]   [facility] [phone]

## 2025-05-19 NOTE — TELEPHONE ENCOUNTER
Caller:  Thomas    Doctor: Dr. MARINELLI    Reason for call:  Patient calling in to ask if he can be scheduled for his ref for TVAR; Called office and was advised that chart will be reviewed and a call will be made back    Call back#: [phone]

## 2025-05-20 ENCOUNTER — TELEPHONE (OUTPATIENT)
Age: 80
End: 2025-05-20

## 2025-05-20 ENCOUNTER — CLINICAL SUPPORT (OUTPATIENT)
Dept: CARDIOLOGY CLINIC | Facility: CLINIC | Age: 80
End: 2025-05-20
Payer: COMMERCIAL

## 2025-05-20 VITALS
OXYGEN SATURATION: 97 % | BODY MASS INDEX: 29.52 KG/M2 | WEIGHT: 230 LBS | SYSTOLIC BLOOD PRESSURE: 142 MMHG | DIASTOLIC BLOOD PRESSURE: 68 MMHG | HEIGHT: 74 IN | HEART RATE: 84 BPM

## 2025-05-20 DIAGNOSIS — I10 ESSENTIAL HYPERTENSION: Primary | ICD-10-CM

## 2025-05-20 LAB
BACTERIA BLD CULT: NORMAL
BACTERIA BLD CULT: NORMAL

## 2025-05-20 PROCEDURE — 99211 OFF/OP EST MAY X REQ PHY/QHP: CPT

## 2025-05-22 ENCOUNTER — RA CDI HCC (OUTPATIENT)
Dept: OTHER | Facility: HOSPITAL | Age: 80
End: 2025-05-22

## 2025-05-24 DIAGNOSIS — E11.51 TYPE 2 DIABETES MELLITUS WITH DIABETIC PERIPHERAL ANGIOPATHY WITHOUT GANGRENE, WITH LONG-TERM CURRENT USE OF INSULIN (HCC): ICD-10-CM

## 2025-05-24 DIAGNOSIS — E78.2 MIXED HYPERLIPIDEMIA: ICD-10-CM

## 2025-05-24 DIAGNOSIS — Z79.4 TYPE 2 DIABETES MELLITUS WITH DIABETIC PERIPHERAL ANGIOPATHY WITHOUT GANGRENE, WITH LONG-TERM CURRENT USE OF INSULIN (HCC): ICD-10-CM

## 2025-05-25 RX ORDER — EMPAGLIFLOZIN 25 MG/1
25 TABLET, FILM COATED ORAL DAILY
Qty: 90 TABLET | Refills: 1 | Status: SHIPPED | OUTPATIENT
Start: 2025-05-25

## 2025-05-27 RX ORDER — FENOFIBRATE 160 MG/1
160 TABLET ORAL DAILY
Qty: 90 TABLET | Refills: 3 | Status: SHIPPED | OUTPATIENT
Start: 2025-05-27

## 2025-05-29 ENCOUNTER — OFFICE VISIT (OUTPATIENT)
Dept: CARDIOLOGY CLINIC | Facility: CLINIC | Age: 80
End: 2025-05-29
Payer: COMMERCIAL

## 2025-05-29 VITALS
SYSTOLIC BLOOD PRESSURE: 130 MMHG | OXYGEN SATURATION: 97 % | DIASTOLIC BLOOD PRESSURE: 75 MMHG | HEART RATE: 73 BPM | TEMPERATURE: 95.8 F | HEIGHT: 74 IN | WEIGHT: 229 LBS | BODY MASS INDEX: 29.39 KG/M2

## 2025-05-29 DIAGNOSIS — I25.10 CORONARY ARTERY DISEASE INVOLVING NATIVE CORONARY ARTERY OF NATIVE HEART WITHOUT ANGINA PECTORIS: ICD-10-CM

## 2025-05-29 DIAGNOSIS — Z95.1 HX OF CABG: ICD-10-CM

## 2025-05-29 DIAGNOSIS — I95.1 ORTHOSTATIC HYPOTENSION: ICD-10-CM

## 2025-05-29 DIAGNOSIS — Z95.0 S/P PLACEMENT OF CARDIAC PACEMAKER: ICD-10-CM

## 2025-05-29 DIAGNOSIS — I10 PRIMARY HYPERTENSION: ICD-10-CM

## 2025-05-29 DIAGNOSIS — I35.0 SEVERE AORTIC VALVE STENOSIS: ICD-10-CM

## 2025-05-29 DIAGNOSIS — E78.2 MIXED HYPERLIPIDEMIA: ICD-10-CM

## 2025-05-29 PROCEDURE — 99214 OFFICE O/P EST MOD 30 MIN: CPT

## 2025-05-29 RX ORDER — TORSEMIDE 20 MG/1
20 TABLET ORAL DAILY
COMMUNITY

## 2025-05-29 NOTE — PROGRESS NOTES
Thomas Horne  1945  [id number]  [facility] [place] [address] [facility] [address] [phone]    1. Primary hypertension        2. Severe aortic valve stenosis        3. Coronary artery disease involving native coronary artery of native heart without angina pectoris        4. Hx of CABG        5. S/P placement of cardiac pacemaker        6. Mixed hyperlipidemia        7. Orthostatic hypotension            Summary/Discussion:  Orthostatic hypotension  - s/p hospital admission on 5/15/2025. Cardiology was consulted   positive orthostatic vital signs  echo: 45-50% visually and 53% on biplane measurement, mild global hypokinesis, diastolic function severely abnormal, consistent with upgraded restrictive relaxation, severe aortic stenosis, or MR, mild to moderate TR, PASP 39  - no recurrence   - encouraged adequate hydration in his allotted amount, slow position changes and use of compression stockings     Aortic stenosis  - severe by echo   - no clinical s/s of volume overload. Continue torsemide 20 mg every other daily PRN   - murmur noted on auscultation   - CT surgery evaluation for TAVR scheduled for 6/3/2025     Chronic diastolic congestive heart failure   - creatinine (5/17/2025): 1.37  - potassium (5/17/2025): 3.6  - appears compensated on exam. Reported home weight of 223 lb  will continue torsemide 20 mg every other day as needed  - heart failure education provided     Coronary artery disease  - with prior CABG x 3 in 2002 and PCI/NANDINI to distal left circumflex in 2021  - [facility] (10/19/2022): triple vessel disease with 100% occluded right coronary artery, 100% occluded mid LAD. SVG to RCA was also hundred percent occluded but RCA had collaterals from left circulation. [place] to LAD was widely patent and no flow was noted in the SVG to the circumflex  - currently without symptoms of angina   - continue plavix, statin, fenofibrate and ranexa. PRN SL nitro prescribed  -  not on aspirin     Hypertension  - 128/70  - antihypertensive therapy have been on hold since recent hospitalization. Will continue to hold given that his blood pressures have been stable   - lifestyle modification   - close blood pressure monitoring   - he will continue checking his blood pressure once daily at home and notify the office if blood pressures are consistently >140    Dyslipidemia  - Lipid Profile:    Latest Reference Range & Units 12/17/24 05:17   Cholesterol See Comment mg/dL 137   Triglycerides See Comment mg/dL 178 (H)   HDL >=40 mg/dL 35 (L)   Non-HDL Cholesterol mg/dl 102   LDL Calculated 0 - 100 mg/dL 66   - continue atorvastatin 40 mg daily and fenofibrate 160 mg daily  - encouraged low cholesterol, mediterranean diet, and annual lipid follow up    CKD 3b  - creatinine as noted above  - baseline appears to be around 1.6-1.8  - follows with nephrology    S/p MDT dual chamber PPM in 12/2024  - most recent interrogation noted normal function    Interval History: Thomas Horne is a 80 y.o. year old male with history mentioned in problem list who presents to the office today for a hospital follow up.     Since his recent hospital admission he has been overall feeling well from a cardiac standpoint. He denies any chest pain/pressure/discomfort or shortness of breath. He denies lower extremity edema, orthopnea, and PND. He denies lightheadedness, dizziness, and syncope. He denies palpitations.        He has an appointment with CT surgery on 6/3/2025.    He will RTO in 6 months with Dr. Pereira or sooner if necessary. He will call with any concerns.       Medical Problems       Problem List       Mixed hyperlipidemia    Primary hypertension    Coronary artery disease involving native coronary artery of native heart without angina pectoris    S/P angioplasty with stent    Hx of CABG    S/P AAA repair    S/P prostatectomy    H/O prostate cancer    Overview Addendum 5/11/2021  9:58 AM by Frank Lombardi, DO    Was Seen at [facility] to be drawn yearly         Type 2 diabetes mellitus with diabetic polyneuropathy, with long-term current use of insulin ([facility])      Lab Results   Component Value Date    HGBA1C 6.4 (H) 02/15/2025         Varicose veins of left lower extremity    History of endovascular stent graft for abdominal aortic aneurysm (AAA)    Bruit (arterial)    PAD (peripheral artery disease) (HCC)    Type 2 diabetes mellitus with diabetic peripheral angiopathy without gangrene, with long-term current use of insulin ([facility])    Overview Signed 1/11/2022  7:36 AM by Cindy Valle MA   Per CMS/ ICD 10 guidelines           Lab Results   Component Value Date    HGBA1C 6.4 (H) 02/15/2025         Actinic keratoses    Platelets decreased (HCC)    Gross hematuria    Severe aortic valve stenosis    Chronic kidney disease-mineral and bone disorder    Lab Results   Component Value Date    EGFR 47 05/17/2025    EGFR 38 05/16/2025    EGFR 34 05/15/2025    CREATININE 1.37 (H) 05/17/2025    CREATININE 1.65 (H) 05/16/2025    CREATININE 1.81 (H) 05/15/2025         Stable angina pectoris (HCC)    Hypertensive urgency    Elevated troponin level not due myocardial infarction    Diabetic ulcer of right midfoot associated with diabetes mellitus due to underlying condition, limited to breakdown of skin ([facility])      Lab Results   Component Value Date    HGBA1C 6.4 (H) 02/15/2025         Chronic diastolic congestive heart failure (HCC)    Wt Readings from Last 3 Encounters:   05/29/25 104 kg (229 lb)   05/20/25 104 kg (230 lb)   05/17/25 101 kg (222 lb)                 Acute kidney injury superimposed on stage 3b chronic kidney disease (HCC)    Lab Results   Component Value Date    EGFR 47 05/17/2025    EGFR 38 05/16/2025    EGFR 34 05/15/2025    CREATININE 1.37 (H) 05/17/2025    CREATININE 1.65 (H) 05/16/2025    CREATININE 1.81 (H) 05/15/2025         Frequent PVCs    Melena    Symptomatic anemia    Multiple gastric ulcers    Iron deficiency  anemia due to chronic blood loss    Duodenal ulcer    History of colon polyps    Shortness of breath    Iron deficiency anemia    Acute deep vein thrombosis (DVT) of left peroneal vein (HCC)    Plantar fasciitis, right    Diabetic ulcer of left foot associated with type 2 diabetes mellitus, with muscle involvement without evidence of necrosis (HCC)      Lab Results   Component Value Date    HGBA1C 6.4 (H) 02/15/2025         History of DVT (deep vein thrombosis) (Chronic)    SOB (shortness of breath)    Nausea    Elevated troponin    Ulcer of right foot (HCC)    Bradycardia    Multiple open wounds of lower extremity    S/P placement of cardiac pacemaker    Bacteremia    Gram-positive bacteremia    Acute metabolic encephalopathy    Constipation    Diabetic ulcer of toe of right foot associated with type 2 diabetes mellitus, with necrosis of bone (HCC)      Lab Results   Component Value Date    HGBA1C 6.4 (H) 02/15/2025         Diabetes mellitus with diabetic polyneuropathy, with long-term current use of insulin (HCC)      Lab Results   Component Value Date    HGBA1C 6.4 (H) 02/15/2025         Abnormal CT scan        Past Medical History[1]  Social History     Socioeconomic History    Marital status:      Spouse name: Not on file    Number of children: 2    Years of education: Not on file    Highest education level: Some college, no degree   Occupational History    Not on file   Tobacco Use    Smoking status: Former     Current packs/day: 0.00     Average packs/day: 1.5 packs/day for 35.0 years (52.5 ttl pk-yrs)     Types: Cigarettes     Start date: 1956     Quit date:      Years since quittin.4     Passive exposure: Past    Smokeless tobacco: Never   Vaping Use    Vaping status: Never Used   Substance and Sexual Activity    Alcohol use: Yes     Alcohol/week: 14.0 standard drinks of alcohol     Types: 14 Cans of beer per week     Comment: stopped last few months    Drug use: Never    Sexual  activity: Not Currently     Partners: Female     Birth control/protection: Male Sterilization   Other Topics Concern    Not on file   Social History Narrative    Not on file     Social Drivers of Health     Financial Resource Strain: Low Risk  (10/31/2023)    Overall Financial Resource Strain (CARDIA)     Difficulty of Paying Living Expenses: Not hard at all   Food Insecurity: No Food Insecurity (5/15/2025)    Nursing - Inadequate Food Risk Classification     Worried About Running Out of Food in the Last Year: Never true     Ran Out of Food in the Last Year: Never true     Ran Out of Food in the Last Year: Never true   Transportation Needs: No Transportation Needs (5/15/2025)    Nursing - Transportation Risk Classification     Lack of Transportation: Not on file     Lack of Transportation: No   Physical Activity: Not on file   Stress: Not on file   Social Connections: Not on file   Intimate Partner Violence: Unknown (5/15/2025)    Nursing IPS     Feels Physically and Emotionally Safe: Not on file     Physically Hurt by Someone: Not on file     Humiliated or Emotionally Abused by Someone: Not on file     Physically Hurt by Someone: No     Hurt or Threatened by Someone: No   Housing Stability: Unknown (5/15/2025)    Nursing: Inadequate Housing Risk Classification     Has Housing: Not on file     Worried About Losing Housing: Not on file     Unable to Get Utilities: Not on file     Unable to Pay for Housing in the Last Year: No     Has Housin      Family History[2]  Past Surgical History[3]  Current Medications[4]  Allergies   Allergen Reactions    Lisinopril Rash and Lip Swelling       Labs:     Chemistry        Component Value Date/Time    K 3.6 2025 0513     2025 0513    CO2 24 2025 0513    CO2 25 2024 1121    BUN 35 (H) 2025 0513    CREATININE 1.37 (H) 2025 0513        Component Value Date/Time    CALCIUM 8.9 2025 0513    ALKPHOS 39 05/15/2025 1651    AST 37  "05/15/2025 1651    ALT 19 05/15/2025 1651            No results found for: \"CHOL\"  Lab Results   Component Value Date    HDL 35 (L) 12/17/2024    HDL 61 10/14/2024    HDL 51 04/17/2024     Lab Results   Component Value Date    LDLCALC 66 12/17/2024    LDLCALC 83 10/14/2024    LDLCALC 57 04/17/2024     Lab Results   Component Value Date    TRIG 178 (H) 12/17/2024    TRIG 154 (H) 10/14/2024    TRIG 113 04/17/2024     No results found for: \"CHOLHDL\"    Imaging: Echo complete w/ contrast if indicated  Result Date: 5/17/2025  Narrative:   Left Ventricle: Left ventricular cavity size is upper normal. Wall thickness is increased. There is moderate concentric hypertrophy. The left ventricular ejection fraction is 45-50% visually and 53% on biplane measurement.  Definity was used to visualize LV cavity.  Systolic function is low normal. There is mild global hypokinesis. Diastolic function is severely abnormal, consistent with ungraded restrictive relaxation.   IVS: There is abnormal septal motion consistent with right ventricular pacing.   Right Ventricle: Systolic function is normal.   Left Atrium: The atrium is severely dilated (>48 mL/m2).   Right Atrium: The atrium is mildly dilated.   Aortic Valve: The aortic valve is trileaflet. The leaflets are moderately thickened. The leaflets are moderately calcified. There is severely reduced mobility. There is mild regurgitation. There is severe stenosis. The aortic valve mean gradient is 27 mmHg, peak gradient is 43 mmHg. The dimensionless velocity index is 0.16. The aortic valve area is 0.65 cm2.   Mitral Valve: There is mild annular calcification. There is moderate regurgitation.   Tricuspid Valve: There is mild to moderate regurgitation. The right ventricular systolic pressure is mildly elevated at 39 mmHg.   Prior TTE study available for comparison. Prior study date: 12/13/2024. Changes noted when compared to prior study. Changes include: Degree of aortic stenosis has " increased to severe.  Possible slight decrease in LV systolic function/EF.  Slight increase in mitral regurgitation and LA size..     XR chest 1 view portable  Result Date: 5/15/2025  Narrative: XR CHEST PORTABLE INDICATION: hypotension. COMPARISON: CXR 12/17/2024, 10/15/2024, chest CT 4/14/2025. FINDINGS: Clear lungs. No pneumothorax or pleural effusion. Moderate cardiomegaly, median sternotomy, left subclavian ICD leads in right atrium and right ventricle. Bones are unremarkable for age. Normal upper abdomen. Moderate elevation right diaphragm.     Impression: No acute cardiopulmonary disease. Workstation performed: [id number]     VAS ARTERIAL DUPLEX- LOWER LIMB BILATERAL  Result Date: 5/13/2025  Narrative: [facility] . MARC COLORADO is a 80 year old M who was referred by ALYSSA ANG.    Indications: Initial Post-op evaluation s/p revascularization. Patient reports tiredness in the legs. Operative History: 01/07/2025 Right SFA stent. 12/16/2024 Cardiac pacer implant. 08/30/2024 Left Distal SFA-below knee pop A BPG. 11/16/2023 Right Pr bypass w/vein femoral- below popliteal. 11/01/2023 IR LE angiogram. 10/19/2022 Cardiac catheterization. 01/01/2002 CABG x3. Endovascular abdominal aortic aneurysm, modular bifurcated prosthesis, two docking limb. Pacemaker Risk Factors: The patient reports hypertension, hyperlipidemia, diabetes, PAD, renal disease, CAD, smoking: quit >10yrs ago and DVT.   Clinical: Right BP: 123/, Left BP: 125/ FINDINGS: Main                                            Segment Impression  PSV (cm/s)  Waveform  Pressure   FABIOLA FABIOLA                                                                                                   Right Post. Tib. (Ankle)  Artery                                                       107  0.86    Right Ant.Tib. (Ankle)  Artery                                                         109  0.87    Right Metatarsal                                                                         95  0.76    Great Toe                                                                               54  0.43    Left Post. Tib. (Ankle) Artery                                                         112  0.90    Left Ant.Tib. (Ankle)  Artery                                                          110  0.88    Left Metatarsal                                                                        104  0.83    Great Toe                                                                               87  0.70 Right                                                                                                 Right Common Femoral Artery                                        141    Normal                   Right Profunda Femoris Artery                                       70                             Prox SFA                                            50-75%         230                             Mid SFA                                             50-75%         134                             Dist SFA                                                            57                             Proximal Pop                                                        72                             Distal Pop                                          50-75%         143                             Tibioperoneal                                                       39                             Right Distal Posterior Tibial Artery              Occluded           0                             Right Distal Anterior Tibial Artery                                 48                             Right Distal Peroneal Artery                                        73                          Left                                                                                                  Com Femoral                                                        100                             Left Profunda Femoris  Artery                                        52                             Prox SFA                                                            93                             Mid SFA                                                             81                             Tibioperoneal                                         >75%         243                             Left Distal Posterior Tibial Artery                                 47                             Left Distal Anterior Tibial Artery                                  55                             Left Distal Peroneal Artery                       Occluded           0                          Right Graft                                                                                           Inflow Anastomosis                          Not visualized                                         Prox.3rd                                    Not visualized                                         Dist. 3rd                                   Not visualized                                         Outflow Anastomosis                         Not visualized                                         Mid. 3rd                                    Not visualized                                      Lt Graft                                                                                              Inflow Anastomosis                                                  37                             Prox.3rd                                                            51                             Mid. 3rd                                                            51                             Dist. 3rd                                                           33                             Outflow Anastomosis                                   >75%         292                          CONCLUSION:  RIGHT LOWER LIMB: Evidence of 50-75% stenosis in the superficial femoral, mid  "femoral and distal popliteal arteries. Evidence suggestive of tibioperoneal occlusive disease. The femoral to popliteal artery bypass graft was not visualized. Ankle/Brachial Index: 0.87 which is mild range. (Prior: 0.77). PVR/ PPG tracings are dampened. Metatarsal pressure of 95 mmHg. Great toe pressure of 54 mmHg, within the healing range.  LEFT LOWER LIMB: Evidence of >75% stenosis at the tibioperoneal trunk/distal anastomosis of the distal superficial femoral- below knee popliteal bypass graft. Evidence suggestive of tibioperoneal occlusive disease. Ankle/Brachial Index: 0.90 which is normal range. (Prior:0.74). PVR/ PPG tracings are dampened. Metatarsal pressure of 104 mmHg. Great toe pressure of 87 mmHg, within the healing range.  Compared to previous study on 12/10/2024 & 1/08/2025, there is an increase in the FABIOLA's.  SIGNATURE Signed by JOE ETRRELL DO, RPVI on 2025-05-13 14:31:28 [email or web address]#Images      ECG:  n/a    Review of Systems   All other systems reviewed and are negative.      Vitals:    05/29/25 0909   BP: 130/75   Pulse:    Temp:    SpO2:      Vitals:    05/29/25 0905   Weight: 104 kg (229 lb)     Height: 6' 2\" (188 cm)   Body mass index is 29.4 kg/m².    Physical Exam  Vitals and nursing note reviewed.   Constitutional:       General: He is not in acute distress.     Appearance: Normal appearance.   HENT:      Head: Normocephalic.      Nose: Nose normal.      Mouth/Throat:      Mouth: Mucous membranes are moist.      Pharynx: Oropharynx is clear.     Cardiovascular:      Rate and Rhythm: Normal rate and regular rhythm.      Pulses: Normal pulses.      Heart sounds: Murmur heard.   Pulmonary:      Breath sounds: Decreased breath sounds present.     Musculoskeletal:         General: Normal range of motion.      Cervical back: Normal range of motion.      Right lower leg: No edema.      Left lower " leg: No edema.     Skin:     General: Skin is warm and dry.     Neurological:      Mental Status: He is alert and oriented to person, place, and time.     Psychiatric:         Mood and Affect: Mood normal.         Behavior: Behavior normal.               [1]   Past Medical History:  Diagnosis Date    Acute kidney injury superimposed on chronic kidney disease  (HCC) 02/16/2022    Anemia     CAD (coronary artery disease)     Callus     Cancer (HCC)     prostate    CHF (congestive heart failure) (HCC)     Chronic kidney disease     Clotting disorder (HCC)     Coronary artery disease     Deep vein thrombosis (HCC)     Diabetes mellitus (HCC)     Difficulty walking     Duodenal ulcer     Ear problems     Erectile dysfunction     Heart disease 1988    Heart murmur     HL (hearing loss)     Hyperlipidemia     Hypertension     Myocardial infarction (HCC)     Neuropathy     Bilateral feet    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)     Plantar fasciitis     Prostate cancer (HCC) 2012    Sleep apnea     Could not tolerate CPAP    Urinary incontinence     Vascular disorder 2023   [2]   Family History  Problem Relation Name Age of Onset    Diabetes Mother Alesha Horne     Varicose Veins Mother Alesha Horne     Alcohol abuse Father Latrice     Heart disease Brother Felix     Cancer Sister Parris Patino         Thyroid    Cancer Sister Margoth         Colon    Cancer Brother Eddany         Throat    Thyroid disease Brother Edward     Cancer Brother Justino     Colon cancer Brother Justino     Diabetes Sister Idalmis     Thyroid disease Sister Parris     Colon cancer Sister Tessy     Colon cancer Sister Tessy     Colon cancer Sister Tessy     Mental illness Neg Hx     [3]   Past Surgical History:  Procedure Laterality Date    ABDOMINAL AORTIC ANEURYSM REPAIR      Stented    ADENOIDECTOMY      BACK SURGERY  1985    CARDIAC CATHETERIZATION Left 10/19/2022    Procedure: Cardiac Left Heart Cath;  Surgeon: Danielle Pereira MD;  Location: [facility];  Service: Cardiology    CARDIAC ELECTROPHYSIOLOGY PROCEDURE Left 12/16/2024    Procedure: Cardiac pacer implant;  Surgeon: Danielle Pereira MD;  Location: [facility];  Service: Cardiology    CARDIAC SURGERY  2002    3 cardiac bypass then angioplasty 7/2020    CHOLECYSTECTOMY      COLONOSCOPY      CORONARY ARTERY BYPASS GRAFT      IR LOWER EXTREMITY ANGIOGRAM  11/01/2023    IR LOWER EXTREMITY ANGIOGRAM  08/07/2024    IR LOWER EXTREMITY ANGIOGRAM  01/07/2025    LAMINECTOMY  1990    AZ BYPASS W/VEIN FEMORAL-POPLITEAL Right 11/16/2023    Procedure: BYPASS FEMORAL-POPLITEAL WITH CRYO VEIN, RIGHT FEMORAL ENDARTERECTOMY;  Surgeon: Vasquez Clark MD;  Location: [facility];  Service: Vascular    AZ BYPASS W/VEIN FEMORAL-POPLITEAL Left 08/30/2024    Procedure: left lower extremity above knee popliteal to below knee popliteal artery bypass with PTFE graft;  Surgeon: Vasquez Clark MD;  Location: [facility];  Service: Vascular    AZ SLCTV CATHJ 3RD+ ORD SLCTV ABDL PEL/LXTR BRNCH Right 11/01/2023    Procedure: ARTERIOGRAM Right lower extremity arteriogram with CO2 via right groin access;  Surgeon: Vasquez Clark MD;  Location: [facility];  Service: Vascular    AZ SLCTV CATHJ 3RD+ ORD SLCTV ABDL PEL/LXTR BRNCH Left 08/07/2024    Procedure: diagnostic LLE Arteriogram;  Surgeon: Vasquez Clark MD;  Location: [facility];  Service: Vascular    PROSTATE SURGERY      PROSTATECTOMY      TONSILLECTOMY      UPPER GASTROINTESTINAL ENDOSCOPY  2/14/2024    URINARY SPHINCTER IMPLANT     [4]   Current Outpatient Medications:     acetaminophen (TYLENOL) 325 mg tablet, Take 2 tablets (650 mg total) by mouth every 6 (six) hours as needed for mild pain, Disp: , Rfl:     atorvastatin (LIPITOR) 40 mg tablet, Take 1 tablet (40 mg total) by mouth daily with dinner, Disp: 90 tablet, Rfl: 1    Blood Glucose Monitoring Suppl (OneTouch Verio Reflect) w/Device KIT, Check blood sugars  twice daily. Please substitute with appropriate alternative as covered by patient's insurance. Dx: E11.65, Disp: 1 kit, Rfl: 0    clopidogrel (PLAVIX) 75 mg tablet, Take 1 tablet (75 mg total) by mouth daily, Disp: 30 tablet, Rfl: 5    DULoxetine (CYMBALTA) 30 mg delayed release capsule, Take 1 capsule (30 mg total) by mouth daily, Disp: 90 capsule, Rfl: 0    Empagliflozin (Jardiance) 25 MG TABS, TAKE 1 TABLET BY MOUTH DAILY, Disp: 90 tablet, Rfl: 1    famotidine (PEPCID) 40 MG tablet, Take 1 tablet (40 mg total) by mouth daily at bedtime, Disp: 90 tablet, Rfl: 1    fenofibrate 160 MG tablet, TAKE 1 TABLET BY MOUTH DAILY, Disp: 90 tablet, Rfl: 3    glucose blood (OneTouch Verio) test strip, TEST TWICE A DAY, Disp: 600 strip, Rfl: 5    insulin glargine (LANTUS) 100 units/mL subcutaneous injection, Inject 15 Units under the skin daily at bedtime, Disp: 13.5 mL, Rfl: 3    Januvia 100 MG tablet, TAKE ONE TABLET BY MOUTH DAILY, Disp: 90 tablet, Rfl: 3    metFORMIN (GLUCOPHAGE) 500 mg tablet, TAKE 1 TABLET BY MOUTH TWICE  DAILY WITH MEALS, Disp: 180 tablet, Rfl: 1    Multiple Vitamins-Minerals (MULTIVITAMIN MEN 50+ PO), Take by mouth in the morning., Disp: , Rfl:     Omega-3 Fatty Acids (fish oil) 1,000 mg, Take 4,000 mg by mouth in the morning and 4,000 mg in the evening. (Patient taking differently: Take 2,000 mg by mouth in the morning and 2,000 mg in the evening.), Disp: , Rfl:     ondansetron (ZOFRAN) 4 mg tablet, Take 1 tablet (4 mg total) by mouth every 12 (twelve) hours as needed for nausea or vomiting, Disp: 30 tablet, Rfl: 0    OneTouch Delica Lancets 33G MISC, Check blood sugars twice daily. Please substitute with appropriate alternative as covered by patient's insurance. Dx: E11.65, Disp: 200 each, Rfl: 3    pantoprazole (PROTONIX) 40 mg tablet, TAKE 1 TABLET BY MOUTH DAILY, Disp: 90 tablet, Rfl: 1    pentoxifylline (TRENtal) 400 mg ER tablet, TAKE 1 TABLET BY MOUTH 3 TIMES  DAILY WITH MEALS, Disp: 270 tablet,  Rfl: 3    pregabalin (LYRICA) 150 mg capsule, Take 1 capsule (150 mg total) by mouth 3 (three) times a day, Disp: 270 capsule, Rfl: 0    ranolazine (RANEXA) 500 mg 12 hr tablet, Take 1 tablet (500 mg total) by mouth 2 (two) times a day, Disp: 60 tablet, Rfl: 0    Sure Comfort Pen Needles 32G X 4 MM MISC, TAKE ONE TABLET BY MOUTH DAILY, Disp: 100 each, Rfl: 5    torsemide (DEMADEX) 20 mg tablet, Take 20 mg by mouth daily Takes every other day; PRN, Disp: , Rfl:     ferrous sulfate 324 (65 Fe) mg, Take 1 tablet (324 mg total) by mouth every other day (Patient not taking: Reported on 5/29/2025), Disp: , Rfl:     nitroglycerin (NITROSTAT) 0.4 mg SL tablet, Place 1 tablet (0.4 mg total) under the tongue every 5 (five) minutes as needed for chest pain (Patient not taking: Reported on 1/15/2025), Disp: 30 tablet, Rfl: 0

## 2025-05-30 ENCOUNTER — OFFICE VISIT (OUTPATIENT)
Dept: FAMILY MEDICINE CLINIC | Facility: CLINIC | Age: 80
End: 2025-05-30
Payer: COMMERCIAL

## 2025-05-30 VITALS
TEMPERATURE: 97.2 F | RESPIRATION RATE: 18 BRPM | OXYGEN SATURATION: 99 % | SYSTOLIC BLOOD PRESSURE: 120 MMHG | HEIGHT: 74 IN | DIASTOLIC BLOOD PRESSURE: 76 MMHG | BODY MASS INDEX: 29.52 KG/M2 | HEART RATE: 87 BPM | WEIGHT: 230 LBS

## 2025-05-30 DIAGNOSIS — N17.9 ACUTE KIDNEY INJURY SUPERIMPOSED ON STAGE 3B CHRONIC KIDNEY DISEASE (HCC): ICD-10-CM

## 2025-05-30 DIAGNOSIS — I95.9 HYPOTENSION, UNSPECIFIED HYPOTENSION TYPE: Primary | ICD-10-CM

## 2025-05-30 DIAGNOSIS — E11.42 TYPE 2 DIABETES MELLITUS WITH DIABETIC POLYNEUROPATHY, WITH LONG-TERM CURRENT USE OF INSULIN (HCC): ICD-10-CM

## 2025-05-30 DIAGNOSIS — N18.32 ACUTE KIDNEY INJURY SUPERIMPOSED ON STAGE 3B CHRONIC KIDNEY DISEASE (HCC): ICD-10-CM

## 2025-05-30 DIAGNOSIS — Z79.4 TYPE 2 DIABETES MELLITUS WITH DIABETIC POLYNEUROPATHY, WITH LONG-TERM CURRENT USE OF INSULIN (HCC): ICD-10-CM

## 2025-05-30 DIAGNOSIS — D50.9 IRON DEFICIENCY ANEMIA, UNSPECIFIED IRON DEFICIENCY ANEMIA TYPE: ICD-10-CM

## 2025-05-30 DIAGNOSIS — I50.32 CHRONIC DIASTOLIC CONGESTIVE HEART FAILURE (HCC): ICD-10-CM

## 2025-05-30 DIAGNOSIS — I35.0 SEVERE AORTIC VALVE STENOSIS: ICD-10-CM

## 2025-05-30 PROBLEM — L97.514 DIABETIC ULCER OF TOE OF RIGHT FOOT ASSOCIATED WITH TYPE 2 DIABETES MELLITUS, WITH NECROSIS OF BONE (HCC): Status: RESOLVED | Noted: 2025-03-28 | Resolved: 2025-05-30

## 2025-05-30 PROBLEM — E11.621 DIABETIC ULCER OF LEFT FOOT ASSOCIATED WITH TYPE 2 DIABETES MELLITUS, WITH MUSCLE INVOLVEMENT WITHOUT EVIDENCE OF NECROSIS (HCC): Status: RESOLVED | Noted: 2024-08-08 | Resolved: 2025-05-30

## 2025-05-30 PROBLEM — E11.621 DIABETIC ULCER OF TOE OF RIGHT FOOT ASSOCIATED WITH TYPE 2 DIABETES MELLITUS, WITH NECROSIS OF BONE (HCC): Status: RESOLVED | Noted: 2025-03-28 | Resolved: 2025-05-30

## 2025-05-30 PROBLEM — L97.525 DIABETIC ULCER OF LEFT FOOT ASSOCIATED WITH TYPE 2 DIABETES MELLITUS, WITH MUSCLE INVOLVEMENT WITHOUT EVIDENCE OF NECROSIS (HCC): Status: RESOLVED | Noted: 2024-08-08 | Resolved: 2025-05-30

## 2025-05-30 PROBLEM — L97.411 DIABETIC ULCER OF RIGHT MIDFOOT ASSOCIATED WITH DIABETES MELLITUS DUE TO UNDERLYING CONDITION, LIMITED TO BREAKDOWN OF SKIN (HCC): Status: RESOLVED | Noted: 2023-06-15 | Resolved: 2025-05-30

## 2025-05-30 PROBLEM — E08.621 DIABETIC ULCER OF RIGHT MIDFOOT ASSOCIATED WITH DIABETES MELLITUS DUE TO UNDERLYING CONDITION, LIMITED TO BREAKDOWN OF SKIN (HCC): Status: RESOLVED | Noted: 2023-06-15 | Resolved: 2025-05-30

## 2025-05-30 PROCEDURE — 99495 TRANSJ CARE MGMT MOD F2F 14D: CPT | Performed by: FAMILY MEDICINE

## 2025-05-30 NOTE — ASSESSMENT & PLAN NOTE
PT states his dM is well controlled  Lab Results   Component Value Date    HGBA1C 6.4 (H) 02/15/2025

## 2025-05-30 NOTE — ASSESSMENT & PLAN NOTE
Wt Readings from Last 3 Encounters:   05/30/25 104 kg (230 lb)   05/29/25 104 kg (229 lb)   05/20/25 104 kg (230 lb)

## 2025-05-30 NOTE — ASSESSMENT & PLAN NOTE
Pt states hematology rel;eased him advising him to have his PCP include iron on his labs       Orders:  •  Iron Panel (Includes Ferritin, Iron Sat%, Iron, and TIBC); Future

## 2025-05-30 NOTE — PROGRESS NOTES
Name: Thomas Horne      : 1945      MRN: [id number]  Encounter Provider: Frank Lombardi, DO  Encounter Date: 2025   Encounter department: [facility]  :  Assessment & Plan  Hypotension, unspecified hypotension type  No symptoms of hypotension since his meds have been adjusted       Chronic diastolic congestive heart failure (HCC)  Wt Readings from Last 3 Encounters:   25 104 kg (230 lb)   25 104 kg (229 lb)   25 104 kg (230 lb)                    Severe aortic valve stenosis  Pt was sent to cardiothoracic surgery for eval for TAVR       Type 2 diabetes mellitus with diabetic polyneuropathy, with long-term current use of insulin ([facility])  PT states his dM is well controlled  Lab Results   Component Value Date    HGBA1C 6.4 (H) 02/15/2025            Acute kidney injury superimposed on stage 3b chronic kidney disease ([facility])  Lab Results   Component Value Date    EGFR 47 2025    EGFR 38 2025    EGFR 34 05/15/2025    CREATININE 1.37 (H) 2025    CREATININE 1.65 (H) 2025    CREATININE 1.81 (H) 05/15/2025     Pt sees Renal - he was Hypotensiveive in her office and that is how he went to the ed       Iron deficiency anemia, unspecified iron deficiency anemia type  Pt states hematology rel;eased him advising him to have his PCP include iron on his labs       Orders:  •  Iron Panel (Includes Ferritin, Iron Sat%, Iron, and TIBC); Future           History of Present Illness   Pt seen for a TCM  Nurses TCM note appreciated  Admitted for hypotension      Review of Systems   Constitutional:  Negative for activity change, appetite change, chills, diaphoresis, fatigue, fever and unexpected weight change.   HENT:  Negative for congestion, dental problem, ear pain, mouth sores, sinus pressure, sinus pain, sore throat and trouble swallowing.    Eyes:  Negative for photophobia, discharge and itching.   Respiratory:  Negative for apnea, chest tightness and shortness of  "breath.    Cardiovascular:  Negative for chest pain, palpitations and leg swelling.   Gastrointestinal:  Negative for abdominal distention, abdominal pain, blood in stool, nausea and vomiting.   Endocrine: Negative for cold intolerance, heat intolerance, polydipsia, polyphagia and polyuria.   Genitourinary:  Negative for difficulty urinating.   Musculoskeletal:  Negative for arthralgias.   Skin:  Negative for color change and wound.   Neurological:  Negative for dizziness, syncope, speech difficulty and headaches.   Hematological:  Negative for adenopathy.   Psychiatric/Behavioral:  Negative for agitation and behavioral problems.        Objective   /76   Pulse 87   Temp (!) 97.2 °F (36.2 °C)   Resp 18   Ht 6' 2\" (1.88 m)   Wt 104 kg (230 lb)   SpO2 99%   BMI 29.53 kg/m²      Physical Exam  Vitals and nursing note reviewed.   Constitutional:       General: He is not in acute distress.     Appearance: He is well-developed. He is not diaphoretic.   HENT:      Head: Normocephalic and atraumatic.      Right Ear: External ear normal.      Left Ear: External ear normal.      Nose: Nose normal.      Mouth/Throat:      Pharynx: No oropharyngeal exudate.     Eyes:      General: No scleral icterus.        Right eye: No discharge.         Left eye: No discharge.      Pupils: Pupils are equal, round, and reactive to light.     Neck:      Thyroid: No thyromegaly.     Cardiovascular:      Rate and Rhythm: Normal rate.      Heart sounds: Normal heart sounds. No murmur heard.  Pulmonary:      Effort: Pulmonary effort is normal. No respiratory distress.      Breath sounds: Normal breath sounds. No wheezing.   Abdominal:      General: Bowel sounds are normal. There is no distension.      Palpations: Abdomen is soft. There is no mass.      Tenderness: There is no abdominal tenderness. There is no guarding or rebound.     Musculoskeletal:         General: Normal range of motion.     Skin:     General: Skin is warm and dry. "      Findings: No erythema or rash.     Neurological:      Mental Status: He is alert.      Coordination: Coordination normal.      Deep Tendon Reflexes: Reflexes normal.     Psychiatric:         Behavior: Behavior normal.

## 2025-05-30 NOTE — ASSESSMENT & PLAN NOTE
Lab Results   Component Value Date    EGFR 47 05/17/2025    EGFR 38 05/16/2025    EGFR 34 05/15/2025    CREATININE 1.37 (H) 05/17/2025    CREATININE 1.65 (H) 05/16/2025    CREATININE 1.81 (H) 05/15/2025     Pt sees Renal - he was Hypotensiveive in her office and that is how he went to the ed

## 2025-06-02 NOTE — LETTER
Diabetic Eye Exam Form/2nd Request    Date Requested: 21  Patient: Ritika Boss  Patient : 1945   Referring Provider: Alivia Boss,     Dilated Retinal Exam, Optomap-Iris Exam, or Fundus Photography Done         Yes ([place] one above)         No     Date of Diabetic Eye Exam ______________________________  Left Eye      Exam did show retinopathy    Exam did not show retinopathy         Mild       Moderate       None       Proliferative       Severe     Right Eye     Exam did show retinopathy    Exam did not show retinopathy         Mild       Moderate       None       Proliferative       Severe     Comments __________________________________________________________    Practice Providing Exam ______________________________________________    Exam Performed By (print name) _______________________________________      Provider Signature ___________________________________________________      These reports are needed for  compliance    Please fax this completed form and a copy of the Diabetic Eye Exam report to our office located at [place] [address] as soon as possible to [phone] nini Crenshaw: Phone [phone]    We thank you for your assistance in treating our mutual patient [id number] Application Not Available 5

## 2025-06-03 ENCOUNTER — OFFICE VISIT (OUTPATIENT)
Dept: CARDIAC SURGERY | Facility: CLINIC | Age: 80
End: 2025-06-03
Payer: COMMERCIAL

## 2025-06-03 VITALS
HEIGHT: 74 IN | WEIGHT: 226 LBS | HEART RATE: 80 BPM | SYSTOLIC BLOOD PRESSURE: 116 MMHG | OXYGEN SATURATION: 98 % | DIASTOLIC BLOOD PRESSURE: 61 MMHG | BODY MASS INDEX: 29 KG/M2

## 2025-06-03 DIAGNOSIS — Z13.6 ENCOUNTER FOR SCREENING FOR STENOSIS OF CAROTID ARTERY: ICD-10-CM

## 2025-06-03 DIAGNOSIS — Z95.0 S/P PLACEMENT OF CARDIAC PACEMAKER: ICD-10-CM

## 2025-06-03 DIAGNOSIS — Z95.828 HISTORY OF ENDOVASCULAR STENT GRAFT FOR ABDOMINAL AORTIC ANEURYSM (AAA): ICD-10-CM

## 2025-06-03 DIAGNOSIS — Z95.820 S/P ANGIOPLASTY WITH STENT: ICD-10-CM

## 2025-06-03 DIAGNOSIS — Z95.1 HX OF CABG: ICD-10-CM

## 2025-06-03 DIAGNOSIS — I35.0 SEVERE AORTIC VALVE STENOSIS: Primary | ICD-10-CM

## 2025-06-03 DIAGNOSIS — I73.9 PAD (PERIPHERAL ARTERY DISEASE) (HCC): ICD-10-CM

## 2025-06-03 PROBLEM — R11.0 NAUSEA: Status: RESOLVED | Noted: 2024-09-06 | Resolved: 2025-06-03

## 2025-06-03 PROBLEM — N18.32 ACUTE KIDNEY INJURY SUPERIMPOSED ON STAGE 3B CHRONIC KIDNEY DISEASE (HCC): Status: RESOLVED | Noted: 2023-06-16 | Resolved: 2025-06-03

## 2025-06-03 PROBLEM — N17.9 ACUTE KIDNEY INJURY SUPERIMPOSED ON STAGE 3B CHRONIC KIDNEY DISEASE (HCC): Status: RESOLVED | Noted: 2023-06-16 | Resolved: 2025-06-03

## 2025-06-03 PROBLEM — D50.9 IRON DEFICIENCY ANEMIA: Status: RESOLVED | Noted: 2024-04-23 | Resolved: 2025-06-03

## 2025-06-03 PROBLEM — D50.0 IRON DEFICIENCY ANEMIA DUE TO CHRONIC BLOOD LOSS: Status: RESOLVED | Noted: 2023-12-27 | Resolved: 2025-06-03

## 2025-06-03 PROBLEM — S81.809A MULTIPLE OPEN WOUNDS OF LOWER EXTREMITY: Status: RESOLVED | Noted: 2024-12-12 | Resolved: 2025-06-03

## 2025-06-03 PROBLEM — L97.519 ULCER OF RIGHT FOOT (HCC): Status: RESOLVED | Noted: 2024-09-07 | Resolved: 2025-06-03

## 2025-06-03 PROBLEM — R78.81 BACTEREMIA: Status: RESOLVED | Noted: 2025-01-04 | Resolved: 2025-06-03

## 2025-06-03 PROBLEM — M72.2 PLANTAR FASCIITIS, RIGHT: Status: RESOLVED | Noted: 2024-07-10 | Resolved: 2025-06-03

## 2025-06-03 PROBLEM — K25.9 MULTIPLE GASTRIC ULCERS: Status: RESOLVED | Noted: 2023-12-27 | Resolved: 2025-06-03

## 2025-06-03 PROBLEM — R78.81 GRAM-POSITIVE BACTEREMIA: Status: RESOLVED | Noted: 2025-01-05 | Resolved: 2025-06-03

## 2025-06-03 PROCEDURE — 99204 OFFICE O/P NEW MOD 45 MIN: CPT | Performed by: STUDENT IN AN ORGANIZED HEALTH CARE EDUCATION/TRAINING PROGRAM

## 2025-06-03 NOTE — H&P (VIEW-ONLY)
Consultation - Cardiothoracic Surgery   Thomas Horne 80 y.o. male MRN: [id number]    Physician Requesting Consult: Naun Bell MD  PCP: Frank Lombardi, DO    Reason for Consult / Principal Problem: Aortic stenosis, Non-Rheumatic    History of Present Illness: Thomas Horne is a 80 y.o. year old male who presents for initial outpatient surgical consultation for symptomatic severe aortic stenosis.  Patient's PMHx is notable for AS, CAD/MI s/p CABG x 3 (LIMA-LAD, SVG-Circ, SVG-RCA; 2002- [facility]) s/p PCI/NANDINI-dCirc (2021- [facility]), chronic diastolic HF, Ascending Aortic Ectasia (4.3 cm), HTN, HLD, DM2 (A1c 6.4%) w/ polyneuropathy and R foot ulcer (healed), CKD 3b, SUSAN (no CPAP), SSS/symptomatic bradycardia s/p PPM (12/2024), duodenal ulcers (12/2023), JESUS ([facility]), [facility] PAD s/p b/l fem-pop bypass (11/2023) and s/p L LE above knee pop-below knee pop bypass (8/2024), h/o DVT (perineal vein), varicose veins, prostate cancer s/p prostatectomy, anxiety, GERD and colon polyps.    Patient was admitted to the hospital in mid May with orthostatic hypotension. Echo was performed and demonstrated progression to severe aortic stenosis. He was seen for out-patient cardiology follow-up and referred to our structural heart disease clinic for consultation.    Upon interview today patient reports progressive fatigue and OQUENDO. He reports occasional exertional chest pain, relieved quickly with rest. He takes his diuretic every other day and if he holds it for a few days he usually notes his weight increasing. When he resumes his diuretic his weight decreases appropriately.   He admits to occasional episodes of orthopnea. He was lightheaded when he was admitted to the hospital with hypotension, but no recent lightheadedness.     Former smoker, quit 1988. Former heavy drinker. No drug use.    He has not seen a dentist in approximately 4 years and reports some broken teeth.    Patient is  retired from employment as a . He is  and his daughter, who is also , lives with him.    Patient is independent with ADL's.       Past Medical History:  Past Medical History:   Diagnosis Date    Acute kidney injury superimposed on chronic kidney disease  (HCC) 02/16/2022    Anemia     CAD (coronary artery disease)     Callus     Cancer (HCC)     prostate    CHF (congestive heart failure) (HCC)     Chronic kidney disease     Clotting disorder (HCC)     Coronary artery disease     Deep vein thrombosis (HCC)     Diabetes mellitus (HCC)     Diabetic ulcer of left foot associated with type 2 diabetes mellitus, with muscle involvement without evidence of necrosis ([facility]) 08/08/2024    Diabetic ulcer of right midfoot associated with diabetes mellitus due to underlying condition, limited to breakdown of skin ([facility]) 06/15/2023    Diabetic ulcer of toe of right foot associated with type 2 diabetes mellitus, with necrosis of bone ([facility]) 03/28/2025    Difficulty walking     Duodenal ulcer     Ear problems     Erectile dysfunction     Heart disease 1988    Heart murmur     HL (hearing loss)     Hyperlipidemia     Hypertension     Myocardial infarction (HCC)     Neuropathy     Bilateral feet    Neuropathy in diabetes ([facility])     Plantar fasciitis     Prostate cancer ([facility]) 2012    Sleep apnea     Could not tolerate CPAP    Urinary incontinence     Vascular disorder 2023       Past Surgical History:   Past Surgical History:   Procedure Laterality Date    ABDOMINAL AORTIC ANEURYSM REPAIR      Stented    ADENOIDECTOMY      BACK SURGERY  1985    CARDIAC CATHETERIZATION Left 10/19/2022    Procedure: Cardiac Left Heart Cath;  Surgeon: Danielle Pereira MD;  Location: [facility];  Service: Cardiology    CARDIAC ELECTROPHYSIOLOGY PROCEDURE Left 12/16/2024    Procedure: Cardiac pacer implant;  Surgeon: Danielle Pereira MD;  Location: [facility];  Service: Cardiology    CARDIAC SURGERY  2002    3  cardiac bypass then angioplasty 7/2020    CHOLECYSTECTOMY      COLONOSCOPY      CORONARY ARTERY BYPASS GRAFT      IR LOWER EXTREMITY ANGIOGRAM  11/01/2023    IR LOWER EXTREMITY ANGIOGRAM  08/07/2024    IR LOWER EXTREMITY ANGIOGRAM  01/07/2025    LAMINECTOMY  1990    VA BYPASS W/VEIN FEMORAL-POPLITEAL Right 11/16/2023    Procedure: BYPASS FEMORAL-POPLITEAL WITH CRYO VEIN, RIGHT FEMORAL ENDARTERECTOMY;  Surgeon: Vasquez Clark MD;  Location: [facility];  Service: Vascular    VA BYPASS W/VEIN FEMORAL-POPLITEAL Left 08/30/2024    Procedure: left lower extremity above knee popliteal to below knee popliteal artery bypass with PTFE graft;  Surgeon: Vasquez Clark MD;  Location: [facility];  Service: Vascular    VA SLCTV CATHJ 3RD+ ORD SLCTV ABDL PEL/LXTR BRNCH Right 11/01/2023    Procedure: ARTERIOGRAM Right lower extremity arteriogram with CO2 via right groin access;  Surgeon: Vasquez Clark MD;  Location: [facility];  Service: Vascular    VA SLCTV CATHJ 3RD+ ORD SLCTV ABDL PEL/LXTR BRNCH Left 08/07/2024    Procedure: diagnostic LLE Arteriogram;  Surgeon: Vasquez Clark MD;  Location: [facility];  Service: Vascular    PROSTATE SURGERY      PROSTATECTOMY      TONSILLECTOMY      UPPER GASTROINTESTINAL ENDOSCOPY  2/14/2024    URINARY SPHINCTER IMPLANT         Family History:  Family History   Problem Relation Name Age of Onset    Diabetes Mother Alesha Horne     Varicose Veins Mother Alesha Horne     Alcohol abuse Father Latrice     Heart disease Brother Felix     Cancer Sister Parris Patino         Thyroid    Cancer Sister Margoth         Colon    Cancer Brother Eddany         Throat    Thyroid disease Brother Edward     Cancer Brother Justino     Colon cancer Brother Justino     Diabetes Sister Idalmis     Thyroid disease Sister Parris     Colon cancer Sister Tessy     Colon cancer Sister Tessy     Colon cancer Sister Tessy     Mental illness Neg Hx         Social  History:    Social History     Substance and Sexual Activity   Alcohol Use Yes    Alcohol/week: 14.0 standard drinks of alcohol    Types: 14 Cans of beer per week    Comment: occ     Social History     Substance and Sexual Activity   Drug Use Never     Tobacco Use History[1]      Home Medications:   Prior to Admission medications    Medication Sig Start Date End Date Taking? Authorizing Provider   acetaminophen (TYLENOL) 325 mg tablet Take 2 tablets (650 mg total) by mouth every 6 (six) hours as needed for mild pain 9/4/24  Yes APOLLO Abrams   atorvastatin (LIPITOR) 40 mg tablet Take 1 tablet (40 mg total) by mouth daily with dinner 2/19/25  Yes Danielle Pereira MD   Blood Glucose Monitoring Suppl (OneTouch Verio Reflect) w/Device KIT Check blood sugars twice daily. Please substitute with appropriate alternative as covered by patient's insurance. Dx: E11.65 2/22/22  Yes Frank Lombardi, DO   clopidogrel (PLAVIX) 75 mg tablet Take 1 tablet (75 mg total) by mouth daily 4/8/25  Yes Danielle Pereira MD   DULoxetine (CYMBALTA) 30 mg delayed release capsule Take 1 capsule (30 mg total) by mouth daily 3/10/25  Yes Sundar Arango DPM   Empagliflozin (Jardiance) 25 MG TABS TAKE 1 TABLET BY MOUTH DAILY 5/25/25  Yes Frank Lombardi, DO   fenofibrate 160 MG tablet TAKE 1 TABLET BY MOUTH DAILY 5/27/25  Yes Danielle Pereira MD   ferrous sulfate 324 (65 Fe) mg Take 1 tablet (324 mg total) by mouth every other day 1/21/25  Yes Jose Fischer MD   glucose blood (OneTouch Verio) test strip TEST TWICE A DAY 3/31/25  Yes Frank Lombardi, DO   insulin glargine (LANTUS) 100 units/mL subcutaneous injection Inject 15 Units under the skin daily at bedtime 2/18/25 2/18/26 Yes Frank Lombardi, DO   Januvia 100 MG tablet TAKE ONE TABLET BY MOUTH DAILY 4/21/25  Yes Frank Lombardi, DO   metFORMIN (GLUCOPHAGE) 500 mg tablet TAKE 1 TABLET BY MOUTH TWICE  DAILY WITH MEALS 4/28/25  Yes Frank Lombardi, DO   Multiple Vitamins-Minerals  (MULTIVITAMIN MEN 50+ PO) Take by mouth in the morning.   Yes Historical Provider, MD   nitroglycerin (NITROSTAT) 0.4 mg SL tablet Place 1 tablet (0.4 mg total) under the tongue every 5 (five) minutes as needed for chest pain 5/10/24 6/3/25 Yes Naa Cruz PA-C   ondansetron (ZOFRAN) 4 mg tablet Take 1 tablet (4 mg total) by mouth every 12 (twelve) hours as needed for nausea or vomiting 2/20/25  Yes Anthony Carty MD   OneTouch Delica Lancets 33G MISC Check blood sugars twice daily. Please substitute with appropriate alternative as covered by patient's insurance. Dx: E11.65 2/22/22  Yes Frank Lombardi,    pantoprazole (PROTONIX) 40 mg tablet TAKE 1 TABLET BY MOUTH DAILY 4/25/25  Yes Tj Paulino PA-C   pentoxifylline (TRENtal) 400 mg ER tablet TAKE 1 TABLET BY MOUTH 3 TIMES  DAILY WITH MEALS 9/16/24  Yes Sundar Arango DPM   pregabalin (LYRICA) 150 mg capsule Take 1 capsule (150 mg total) by mouth 3 (three) times a day 3/10/25 6/8/25 Yes Sundar Arango DPM   ranolazine (RANEXA) 500 mg 12 hr tablet Take 1 tablet (500 mg total) by mouth 2 (two) times a day 5/17/25  Yes Oly Erickson, DO   Sure Comfort Pen Needles 32G X 4 MM MISC TAKE ONE TABLET BY MOUTH DAILY 4/10/25  Yes Frank Lombardi, DO   torsemide (DEMADEX) 20 mg tablet Take 20 mg by mouth daily Takes every other day; PRN  Patient taking differently: Take 20 mg by mouth if needed Takes every other day; PRN   Yes Historical Provider, MD   famotidine (PEPCID) 40 MG tablet Take 1 tablet (40 mg total) by mouth daily at bedtime 2/20/25 5/30/25  Anthony Carty MD   Omega-3 Fatty Acids (fish oil) 1,000 mg Take 4,000 mg by mouth in the morning and 4,000 mg in the evening.    Historical Provider, MD       Allergies:  Allergies   Allergen Reactions    Lisinopril Rash and Lip Swelling       Review of Systems:  Review of Systems - History obtained from chart review and the patient  General ROS: positive for  - fatigue  negative for - chills or  "fever  Psychological ROS: negative  Ophthalmic ROS: positive for - uses glasses  ENT ROS: positive for - broken teeth  Allergy and Immunology ROS: negative  Hematological and Lymphatic ROS: negative  Endocrine ROS: negative  Breast ROS: negative  Respiratory ROS: positive for - orthopnea and shortness of breath  negative for - cough, hemoptysis, or pleuritic pain  Cardiovascular ROS: positive for - chest pain, dyspnea on exertion, murmur, and orthopnea  negative for - edema, irregular heartbeat, loss of consciousness, palpitations, paroxysmal nocturnal dyspnea, or rapid heart rate  Gastrointestinal ROS: no abdominal pain, change in bowel habits, or black or bloody stools  Genito-Urinary ROS: no dysuria, trouble voiding, or hematuria  Musculoskeletal ROS: negative  Neurological ROS: no TIA or stroke symptoms  Dermatological ROS: negative    Vital Signs:     Vitals:    06/03/25 1144 06/03/25 1145   BP: 116/55 116/61   BP Location: Left arm Right arm   Patient Position: Sitting Sitting   Cuff Size: Large Large   Pulse: 80    SpO2: 98%    Weight: 103 kg (226 lb)    Height: 6' 2\" (1.88 m)        Physical Exam:    General: Alert, oriented, well developed, no acuter distress  HEENT/NECK:  PERRLA.  No jugular venous distention.    Cardiac:Regular rate and rhythm, III/VI systolic murmur   Carotid arteries: 1+ pulses, no bruits  Pulmonary:  Breath sounds clear bilaterally.  Abdomen:  Non-tender, Non-distended.  Positive bowel sounds.  Upper extremities: 2+ radial pulses; brisk capillary refill  Lower extremities: Extremities warm/dry.  PT/DP pulses 1+ bilaterally.  No edema B/L; varicosities b/l  Neuro: Alert and oriented X 3.  Sensation is grossly intact.  No focal deficits.  Musculoskeletal: MAEE, stable gait  Skin: Warm/Dry, without rashes or lesions.      Lab Results:     Lab Results   Component Value Date    WBC 5.96 05/16/2025    HGB 12.3 05/16/2025    HCT 39.5 05/16/2025    MCV 88 05/16/2025     05/16/2025 "     Lab Results   Component Value Date    SODIUM 138 05/17/2025    K 3.6 05/17/2025     05/17/2025    CO2 24 05/17/2025    AGAP 9 05/17/2025    BUN 35 (H) 05/17/2025    CREATININE 1.37 (H) 05/17/2025    GLUC 104 05/17/2025    GLUF 102 (H) 05/16/2025    CALCIUM 8.9 05/17/2025    AST 37 05/15/2025    ALT 19 05/15/2025    ALKPHOS 39 05/15/2025    TP 7.8 05/15/2025    TBILI 0.57 05/15/2025    EGFR 47 05/17/2025     Lab Results   Component Value Date    CHOLESTEROL 137 12/17/2024    CHOLESTEROL 175 10/14/2024    CHOLESTEROL 131 04/17/2024     Lab Results   Component Value Date    HDL 35 (L) 12/17/2024    HDL 61 10/14/2024    HDL 51 04/17/2024     Lab Results   Component Value Date    TRIG 178 (H) 12/17/2024    TRIG 154 (H) 10/14/2024    TRIG 113 04/17/2024     Lab Results   Component Value Date    NONHDLC 102 12/17/2024         Lab Results   Component Value Date    HGBA1C 6.4 (H) 02/15/2025         Imaging Studies:     Echocardiogram: 5/17/25         Left Ventricle: Left ventricular cavity size is upper normal. Wall thickness is increased. There is moderate concentric hypertrophy. The left ventricular ejection fraction is 45-50% visually and 53% on biplane measurement.  Definity was used to visualize LV cavity.  Systolic function is low normal. There is mild global hypokinesis. Diastolic function is severely abnormal, consistent with ungraded restrictive relaxation.    IVS: There is abnormal septal motion consistent with right ventricular pacing.    Right Ventricle: Systolic function is normal.    Left Atrium: The atrium is severely dilated (>48 mL/m2).    Right Atrium: The atrium is mildly dilated.    Aortic Valve: The aortic valve is trileaflet. The leaflets are moderately thickened. The leaflets are moderately calcified. There is severely reduced mobility. There is mild regurgitation. There is severe stenosis. The aortic valve mean gradient is 27 mmHg, peak gradient is 43 mmHg. The dimensionless velocity index  is 0.16. The aortic valve area is 0.65 cm2.    Mitral Valve: There is mild annular calcification. There is moderate regurgitation.    Tricuspid Valve: There is mild to moderate regurgitation. The right ventricular systolic pressure is mildly elevated at 39 mmHg.    Prior TTE study available for comparison. Prior study date: 12/13/2024. Changes noted when compared to prior study. Changes include: Degree of aortic stenosis has increased to severe.  Possible slight decrease in LV systolic function/EF.  Slight increase in mitral regurgitation and LA size..        Findings    Left Ventricle Left ventricular cavity size is upper normal. Wall thickness is increased. There is moderate concentric hypertrophy. The left ventricular ejection fraction is 45-50% visually and 53% on biplane measurement.  Definity was used to visualize LV cavity. Systolic function is low normal. There is mild global hypokinesis. Diastolic function is severely abnormal, consistent with ungraded restrictive relaxation.   Interventricular Septum There is abnormal septal motion consistent with right ventricular pacing.   Right Ventricle Right ventricular cavity size is normal. Systolic function is normal. Wall thickness is normal.   Left Atrium The atrium is severely dilated (>48 mL/m2).   Right Atrium The atrium is mildly dilated.   Aortic Valve The aortic valve is trileaflet. The leaflets are moderately thickened. The leaflets are moderately calcified. There is severely reduced mobility. There is mild regurgitation. There is severe stenosis. The aortic valve mean gradient is 27 mmHg, peak gradient is 43 mmHg. The dimensionless velocity index is 0.16. The aortic valve area is 0.65 cm2.   Mitral Valve There is mild thickening. There is mild annular calcification.  There is moderate regurgitation. There is no evidence of stenosis.   Tricuspid Valve Tricuspid valve structure is normal. There is mild to moderate regurgitation. There is no evidence of  stenosis. The right ventricular systolic pressure is mildly elevated at 39 mmHg.   Pulmonic Valve Pulmonic valve structure is normal. There is no evidence of regurgitation. There is no evidence of stenosis.   Ascending Aorta The aortic root is normal in size.   IVC/SVC The inferior vena cava is not well visualized.   Pericardium There is no pericardial effusion. The pericardium is normal in appearance.     Left Ventricle Measurements    Function/Volumes   A4C EF 67 %         LVOT stroke volume 45.93         LVOT stroke volume index 19.8 ml/m2         Left ventricular stroke volume (2D) 50 mL         LV Diastolic Volume (BP) 184 mL         LV Systolic Volume (BP) 87 mL         EF 53 %         LVOT Cardiac Output 3.23 l/min         LVOT Cardiac Index 1.42 l/min/m2         Dimensions   LVIDd 5.7 cm         LVIDS 4.9 cm         IVSd 1.5 cm         LVPWd 1.2 cm         LVOT area 4.15 cm2         FS 14         Diastolic Filling   MV E' Tissue Velocity Septal 5 cm/s         LA Volume Index (BP) 48.5 mL/m2         E/A ratio 2.49         E wave deceleration time 157 ms         MV Peak E Robert 122 cm/s         MV Peak A Robert 0.49 m/s          Report Measurements   AV LVOT peak gradient 1 mmHg              Interventricular Septum Measurements    Shunt Ratio   LVOT peak VTI 11.06 cm         LVOT peak robert 0.51 m/s              Right Ventricle Measurements    Dimensions   RVID d 3.6 cm         Tricuspid annular plane systolic excursion 1.8 cm               Left Atrium Measurements    Dimensions   LA size 5.4 cm         LA length (A2C) 7.2 cm         Volumes   LA volume (BP) 110 mL         LA Volume Index (BP) 48.5 mL/m2               Right Atrium Measurements    Dimensions   RAA A4C 19.5 cm2               Atrial Septum Measurements    Shunt Ratio   LVOT peak VTI 11.06 cm         LVOT peak robert 0.51 m/s               Aortic Valve Measurements    Stenosis   Aortic valve peak velocity 3.26 m/s         LVOT peak robert 0.51 m/s         Ao  VTI 71.02 cm         LVOT peak VTI 11.06 cm         AV mean gradient 27 mmHg         LVOT mn grad 0 mmHg         AV peak gradient 43 mmHg         AV LVOT peak gradient 1 mmHg         Area/Dimensions   DVI 0.16         AV valve area 0.65 cm2         AV area by cont VTI 0.6 cm2         AV area peak robert 0.6 cm2         LVOT diameter 2.3 cm         LVOT area 4.15 cm2               Mitral Valve Measurements    Stenosis   MV stenosis pressure 1/2 time 46 ms         MV valve area p 1/2 method 4.78               Tricuspid Valve Measurements    RVSP Parameters   TR Peak Robert 2.9 m/s         Triscuspid Valve Regurgitation Peak Gradient 34 mmHg               Aorta Measurements    Aortic Dimensions   Ao root 4.3 cm               CT c/a/p w/ contrast: 4/14/25    IMPRESSION:     1. No acute findings in the chest, abdomen, or pelvis.     2. Left lower lobe solid nodule measuring 1.5 cm, stable from 9/6/2024, previously 0.8 cm on 8/2/2022. Recommend PET/CT and/or tissue sampling if not already performed.     3. 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 Recommendation is for follow-up low radiation dose chest CT in one year.     4. Post EVAR. Unchanged infrarenal abdominal aortic aneurysm sac measuring up to 5.3 cm.     5. Pancreatic cystic lesions measuring up to 1.4 cm.    Lower Extremity Arterial Duplex: 5/13/25    RIGHT LOWER LIMB:   Evidence of 50-75% stenosis in the superficial femoral, mid femoral and distal popliteal arteries.   Evidence suggestive of tibioperoneal occlusive disease.   The femoral to popliteal artery bypass graft was not visualized.  Ankle/Brachial Index: 0.87 which is mild range. (Prior: 0.77).   PVR/ PPG tracings are dampened.   Metatarsal pressure of 95 mmHg.   Great toe pressure of 54 mmHg, within the healing range.      LEFT LOWER LIMB:   Evidence of >75% stenosis at the tibioperoneal trunk/distal anastomosis  of the distal superficial femoral- below knee popliteal  bypass graft.  Evidence suggestive of tibioperoneal occlusive disease.   Ankle/Brachial Index: 0.90 which is normal range. (Prior:0.74).   PVR/ PPG tracings are dampened.   Metatarsal pressure of 104 mmHg.   Great toe pressure of 87 mmHg, within the healing range.        Results Review Statement: I personally reviewed the following image studies in PACS and associated radiology reports: Echo, CT c/a/p and LEAD. My interpretation of the radiology images/reports is: AS.    TAVR evaluation Assessment:     [facility]: III    Aortic Stenosis Stage: D3    5 Meter Walk: 6 sec, 7 sec, 7 sec    STS risk score (preliminary): 4.8%    KCCQ-12 completed    Assessment:  Patient Active Problem List    Diagnosis Date Noted    Abnormal CT scan 05/15/2025    Constipation 02/21/2025    Gram-positive bacteremia 01/05/2025    Acute metabolic encephalopathy 01/05/2025    Bacteremia 01/04/2025    S/P placement of cardiac pacemaker 12/20/2024    Bradycardia 12/12/2024    Multiple open wounds of lower extremity 12/12/2024    Ulcer of right foot (HCC) 09/07/2024    SOB (shortness of breath) 09/06/2024    Nausea 09/06/2024    Elevated troponin 09/06/2024    History of DVT (deep vein thrombosis) 08/26/2024    Plantar fasciitis, right 07/10/2024    Acute deep vein thrombosis (DVT) of left peroneal vein (HCC) 05/01/2024    Iron deficiency anemia 04/23/2024    Shortness of breath 04/09/2024    History of colon polyps 02/22/2024    Duodenal ulcer 02/01/2024    Multiple gastric ulcers 12/27/2023    Iron deficiency anemia due to chronic blood loss 12/27/2023    Melena 12/26/2023    Symptomatic anemia 12/26/2023    Frequent PVCs 06/17/2023    Chronic diastolic congestive heart failure (HCC) 06/16/2023    Acute kidney injury superimposed on stage 3b chronic kidney disease (HCC) 06/16/2023    Hypertensive urgency 05/11/2023    Elevated troponin level not due myocardial infarction 05/11/2023    Stable angina pectoris (HCC) 03/07/2023    Chronic kidney  disease-mineral and bone disorder 11/30/2022    Severe aortic valve stenosis 11/11/2022    Gross hematuria 07/26/2022    Platelets decreased ([facility]) 07/22/2022    Actinic keratoses 01/14/2022    Type 2 diabetes mellitus with diabetic peripheral angiopathy without gangrene, with long-term current use of insulin ([facility]) 01/11/2022    Varicose veins of left lower extremity 06/25/2021    History of endovascular stent graft for abdominal aortic aneurysm (AAA) 06/25/2021    Bruit (arterial) 06/25/2021    PAD (peripheral artery disease) (HCC) 06/25/2021    Type 2 diabetes mellitus with diabetic polyneuropathy, with long-term current use of insulin ([facility]) 06/10/2021    Mixed hyperlipidemia 05/11/2021    Primary hypertension 05/11/2021    Coronary artery disease involving native coronary artery of native heart without angina pectoris 05/11/2021    S/P angioplasty with stent 05/11/2021    Hx of CABG 05/11/2021    S/P AAA repair 05/11/2021    S/P prostatectomy 05/11/2021    H/O prostate cancer 05/11/2021     Cardiology notes reviewed    Impression/Plan:    Thomas Horne has symptomatic severe aortic stenosis. He will undergo the following testing for transcatheter aortic valve replacement: Gated CTA of the chest/abdomen/pelvis, cardiac catheterization, dental clearance and carotid artery ultrasound.      Once these studies have been completed, Thomas Horne will follow up in our office to review the results and to be evaluated to confirm the suitability of proceeding with transcatheter aortic valve replacment.     Thomas Horne was comfortable with our recommendations, and their questions were answered to their satisfaction.    Thank you for allowing us to participate in the care of this patient.     Routine referral to gastroenterology for colonoscopy screening was not indicated, as the patient is over 73 years old    SIGNATURE: APOLLO Trotter  DATE: Zahra 3, 2025  TIME: 11:48 AM         [1]   Social History  Tobacco Use    Smoking Status Former    Current packs/day: 0.00    Average packs/day: 1.5 packs/day for 35.0 years (52.5 ttl pk-yrs)    Types: Cigarettes    Start date: 1956    Quit date:     Years since quittin.4    Passive exposure: Past   Smokeless Tobacco Never

## 2025-06-03 NOTE — PROGRESS NOTES
Consultation - Cardiothoracic Surgery   Thomas Horne 80 y.o. male MRN: [id number]    Physician Requesting Consult: Naun Bell MD  PCP: Frank Lombardi, DO    Reason for Consult / Principal Problem: Aortic stenosis, Non-Rheumatic    History of Present Illness: Thomas Horne is a 80 y.o. year old male who presents for initial outpatient surgical consultation for symptomatic severe aortic stenosis.  Patient's PMHx is notable for AS, CAD/MI s/p CABG x 3 (LIMA-LAD, SVG-Circ, SVG-RCA; 2002- [facility]) s/p PCI/NANDINI-dCirc (2021- [place]), chronic diastolic HF, Ascending Aortic Ectasia (4.3 cm), HTN, HLD, DM2 (A1c 6.4%) w/ polyneuropathy and R foot ulcer (healed), CKD 3b, SUSAN (no CPAP), SSS/symptomatic bradycardia s/p PPM (12/2024), duodenal ulcers (12/2023), JESUS ([facility]), [facility] PAD s/p b/l fem-pop bypass (11/2023) and s/p L LE above knee pop-below knee pop bypass (8/2024), h/o DVT (perineal vein), varicose veins, prostate cancer s/p prostatectomy, anxiety, GERD and colon polyps.    Patient was admitted to the hospital in mid May with orthostatic hypotension. Echo was performed and demonstrated progression to severe aortic stenosis. He was seen for out-patient cardiology follow-up and referred to our structural heart disease clinic for consultation.    Upon interview today patient reports progressive fatigue and OQUENDO. He reports occasional exertional chest pain, relieved quickly with rest. He takes his diuretic every other day and if he holds it for a few days he usually notes his weight increasing. When he resumes his diuretic his weight decreases appropriately.   He admits to occasional episodes of orthopnea. He was lightheaded when he was admitted to the hospital with hypotension, but no recent lightheadedness.     Former smoker, quit 1988. Former heavy drinker. No drug use.    He has not seen a dentist in approximately 4 years and reports some broken teeth.    Patient is  retired from employment as a . He is  and his daughter, who is also , lives with him.    Patient is independent with ADL's.       Past Medical History:  Past Medical History:   Diagnosis Date    Acute kidney injury superimposed on chronic kidney disease  (HCC) 02/16/2022    Anemia     CAD (coronary artery disease)     Callus     Cancer (HCC)     prostate    CHF (congestive heart failure) (HCC)     Chronic kidney disease     Clotting disorder (HCC)     Coronary artery disease     Deep vein thrombosis (HCC)     Diabetes mellitus (HCC)     Diabetic ulcer of left foot associated with type 2 diabetes mellitus, with muscle involvement without evidence of necrosis ([facility]) 08/08/2024    Diabetic ulcer of right midfoot associated with diabetes mellitus due to underlying condition, limited to breakdown of skin ([facility]) 06/15/2023    Diabetic ulcer of toe of right foot associated with type 2 diabetes mellitus, with necrosis of bone ([facility]) 03/28/2025    Difficulty walking     Duodenal ulcer     Ear problems     Erectile dysfunction     Heart disease 1988    Heart murmur     HL (hearing loss)     Hyperlipidemia     Hypertension     Myocardial infarction (HCC)     Neuropathy     Bilateral feet    Neuropathy in diabetes ([facility])     Plantar fasciitis     Prostate cancer ([facility]) 2012    Sleep apnea     Could not tolerate CPAP    Urinary incontinence     Vascular disorder 2023       Past Surgical History:   Past Surgical History:   Procedure Laterality Date    ABDOMINAL AORTIC ANEURYSM REPAIR      Stented    ADENOIDECTOMY      BACK SURGERY  1985    CARDIAC CATHETERIZATION Left 10/19/2022    Procedure: Cardiac Left Heart Cath;  Surgeon: Danielle Pereira MD;  Location: [facility];  Service: Cardiology    CARDIAC ELECTROPHYSIOLOGY PROCEDURE Left 12/16/2024    Procedure: Cardiac pacer implant;  Surgeon: Danielle Pereira MD;  Location: [facility];  Service: Cardiology    CARDIAC SURGERY  2002    3  cardiac bypass then angioplasty 7/2020    CHOLECYSTECTOMY      COLONOSCOPY      CORONARY ARTERY BYPASS GRAFT      IR LOWER EXTREMITY ANGIOGRAM  11/01/2023    IR LOWER EXTREMITY ANGIOGRAM  08/07/2024    IR LOWER EXTREMITY ANGIOGRAM  01/07/2025    LAMINECTOMY  1990    TN BYPASS W/VEIN FEMORAL-POPLITEAL Right 11/16/2023    Procedure: BYPASS FEMORAL-POPLITEAL WITH CRYO VEIN, RIGHT FEMORAL ENDARTERECTOMY;  Surgeon: Vasquez Clark MD;  Location: [facility];  Service: Vascular    TN BYPASS W/VEIN FEMORAL-POPLITEAL Left 08/30/2024    Procedure: left lower extremity above knee popliteal to below knee popliteal artery bypass with PTFE graft;  Surgeon: Vasquez Clark MD;  Location: [facility];  Service: Vascular    TN SLCTV CATHJ 3RD+ ORD SLCTV ABDL PEL/LXTR BRNCH Right 11/01/2023    Procedure: ARTERIOGRAM Right lower extremity arteriogram with CO2 via right groin access;  Surgeon: Vasquez Clark MD;  Location: [facility];  Service: Vascular    TN SLCTV CATHJ 3RD+ ORD SLCTV ABDL PEL/LXTR BRNCH Left 08/07/2024    Procedure: diagnostic LLE Arteriogram;  Surgeon: Vasquez Clark MD;  Location: [facility];  Service: Vascular    PROSTATE SURGERY      PROSTATECTOMY      TONSILLECTOMY      UPPER GASTROINTESTINAL ENDOSCOPY  2/14/2024    URINARY SPHINCTER IMPLANT         Family History:  Family History   Problem Relation Name Age of Onset    Diabetes Mother Alesha Horne     Varicose Veins Mother Alesha Horne     Alcohol abuse Father Latrice     Heart disease Brother Felix     Cancer Sister Parris Patino         Thyroid    Cancer Sister Margoth         Colon    Cancer Brother Eddany         Throat    Thyroid disease Brother Edward     Cancer Brother Justino     Colon cancer Brother Justino     Diabetes Sister Idalmis     Thyroid disease Sister Parris     Colon cancer Sister Tessy     Colon cancer Sister Tessy     Colon cancer Sister Tessy     Mental illness Neg Hx         Social  History:    Social History     Substance and Sexual Activity   Alcohol Use Yes    Alcohol/week: 14.0 standard drinks of alcohol    Types: 14 Cans of beer per week    Comment: occ     Social History     Substance and Sexual Activity   Drug Use Never     Tobacco Use History[1]      Home Medications:   Prior to Admission medications    Medication Sig Start Date End Date Taking? Authorizing Provider   acetaminophen (TYLENOL) 325 mg tablet Take 2 tablets (650 mg total) by mouth every 6 (six) hours as needed for mild pain 9/4/24  Yes APOLLO Abrams   atorvastatin (LIPITOR) 40 mg tablet Take 1 tablet (40 mg total) by mouth daily with dinner 2/19/25  Yes Danielle Pereira MD   Blood Glucose Monitoring Suppl (OneTouch Verio Reflect) w/Device KIT Check blood sugars twice daily. Please substitute with appropriate alternative as covered by patient's insurance. Dx: E11.65 2/22/22  Yes Frank Lombardi, DO   clopidogrel (PLAVIX) 75 mg tablet Take 1 tablet (75 mg total) by mouth daily 4/8/25  Yes Danielle Peerira MD   DULoxetine (CYMBALTA) 30 mg delayed release capsule Take 1 capsule (30 mg total) by mouth daily 3/10/25  Yes Sundar Arango DPM   Empagliflozin (Jardiance) 25 MG TABS TAKE 1 TABLET BY MOUTH DAILY 5/25/25  Yes Frank Lombardi, DO   fenofibrate 160 MG tablet TAKE 1 TABLET BY MOUTH DAILY 5/27/25  Yes Danielle Pereira MD   ferrous sulfate 324 (65 Fe) mg Take 1 tablet (324 mg total) by mouth every other day 1/21/25  Yes Jose Fischer MD   glucose blood (OneTouch Verio) test strip TEST TWICE A DAY 3/31/25  Yes Frank Lombardi, DO   insulin glargine (LANTUS) 100 units/mL subcutaneous injection Inject 15 Units under the skin daily at bedtime 2/18/25 2/18/26 Yes Frank Lombardi, DO   Januvia 100 MG tablet TAKE ONE TABLET BY MOUTH DAILY 4/21/25  Yes Frank Lombardi, DO   metFORMIN (GLUCOPHAGE) 500 mg tablet TAKE 1 TABLET BY MOUTH TWICE  DAILY WITH MEALS 4/28/25  Yes Frank Lombardi, DO   Multiple Vitamins-Minerals  (MULTIVITAMIN MEN 50+ PO) Take by mouth in the morning.   Yes Historical Provider, MD   nitroglycerin (NITROSTAT) 0.4 mg SL tablet Place 1 tablet (0.4 mg total) under the tongue every 5 (five) minutes as needed for chest pain 5/10/24 6/3/25 Yes Naa Cruz PA-C   ondansetron (ZOFRAN) 4 mg tablet Take 1 tablet (4 mg total) by mouth every 12 (twelve) hours as needed for nausea or vomiting 2/20/25  Yes Anthony Carty MD   OneTouch Delica Lancets 33G MISC Check blood sugars twice daily. Please substitute with appropriate alternative as covered by patient's insurance. Dx: E11.65 2/22/22  Yes Frank Lombardi,    pantoprazole (PROTONIX) 40 mg tablet TAKE 1 TABLET BY MOUTH DAILY 4/25/25  Yes Tj Paulino PA-C   pentoxifylline (TRENtal) 400 mg ER tablet TAKE 1 TABLET BY MOUTH 3 TIMES  DAILY WITH MEALS 9/16/24  Yes Sundar Arango DPM   pregabalin (LYRICA) 150 mg capsule Take 1 capsule (150 mg total) by mouth 3 (three) times a day 3/10/25 6/8/25 Yes Sundar Arango DPM   ranolazine (RANEXA) 500 mg 12 hr tablet Take 1 tablet (500 mg total) by mouth 2 (two) times a day 5/17/25  Yes Oly Erickson, DO   Sure Comfort Pen Needles 32G X 4 MM MISC TAKE ONE TABLET BY MOUTH DAILY 4/10/25  Yes Frank Lombardi, DO   torsemide (DEMADEX) 20 mg tablet Take 20 mg by mouth daily Takes every other day; PRN  Patient taking differently: Take 20 mg by mouth if needed Takes every other day; PRN   Yes Historical Provider, MD   famotidine (PEPCID) 40 MG tablet Take 1 tablet (40 mg total) by mouth daily at bedtime 2/20/25 5/30/25  Anthony Carty MD   Omega-3 Fatty Acids (fish oil) 1,000 mg Take 4,000 mg by mouth in the morning and 4,000 mg in the evening.    Historical Provider, MD       Allergies:  Allergies   Allergen Reactions    Lisinopril Rash and Lip Swelling       Review of Systems:  Review of Systems - History obtained from chart review and the patient  General ROS: positive for  - fatigue  negative for - chills or  "fever  Psychological ROS: negative  Ophthalmic ROS: positive for - uses glasses  ENT ROS: positive for - broken teeth  Allergy and Immunology ROS: negative  Hematological and Lymphatic ROS: negative  Endocrine ROS: negative  Breast ROS: negative  Respiratory ROS: positive for - orthopnea and shortness of breath  negative for - cough, hemoptysis, or pleuritic pain  Cardiovascular ROS: positive for - chest pain, dyspnea on exertion, murmur, and orthopnea  negative for - edema, irregular heartbeat, loss of consciousness, palpitations, paroxysmal nocturnal dyspnea, or rapid heart rate  Gastrointestinal ROS: no abdominal pain, change in bowel habits, or black or bloody stools  Genito-Urinary ROS: no dysuria, trouble voiding, or hematuria  Musculoskeletal ROS: negative  Neurological ROS: no TIA or stroke symptoms  Dermatological ROS: negative    Vital Signs:     Vitals:    06/03/25 1144 06/03/25 1145   BP: 116/55 116/61   BP Location: Left arm Right arm   Patient Position: Sitting Sitting   Cuff Size: Large Large   Pulse: 80    SpO2: 98%    Weight: 103 kg (226 lb)    Height: 6' 2\" (1.88 m)        Physical Exam:    General: Alert, oriented, well developed, no acuter distress  HEENT/NECK:  PERRLA.  No jugular venous distention.    Cardiac:Regular rate and rhythm, III/VI systolic murmur   Carotid arteries: 1+ pulses, no bruits  Pulmonary:  Breath sounds clear bilaterally.  Abdomen:  Non-tender, Non-distended.  Positive bowel sounds.  Upper extremities: 2+ radial pulses; brisk capillary refill  Lower extremities: Extremities warm/dry.  PT/DP pulses 1+ bilaterally.  No edema B/L; varicosities b/l  Neuro: Alert and oriented X 3.  Sensation is grossly intact.  No focal deficits.  Musculoskeletal: MAEE, stable gait  Skin: Warm/Dry, without rashes or lesions.      Lab Results:     Lab Results   Component Value Date    WBC 5.96 05/16/2025    HGB 12.3 05/16/2025    HCT 39.5 05/16/2025    MCV 88 05/16/2025     05/16/2025 "     Lab Results   Component Value Date    SODIUM 138 05/17/2025    K 3.6 05/17/2025     05/17/2025    CO2 24 05/17/2025    AGAP 9 05/17/2025    BUN 35 (H) 05/17/2025    CREATININE 1.37 (H) 05/17/2025    GLUC 104 05/17/2025    GLUF 102 (H) 05/16/2025    CALCIUM 8.9 05/17/2025    AST 37 05/15/2025    ALT 19 05/15/2025    ALKPHOS 39 05/15/2025    TP 7.8 05/15/2025    TBILI 0.57 05/15/2025    EGFR 47 05/17/2025     Lab Results   Component Value Date    CHOLESTEROL 137 12/17/2024    CHOLESTEROL 175 10/14/2024    CHOLESTEROL 131 04/17/2024     Lab Results   Component Value Date    HDL 35 (L) 12/17/2024    HDL 61 10/14/2024    HDL 51 04/17/2024     Lab Results   Component Value Date    TRIG 178 (H) 12/17/2024    TRIG 154 (H) 10/14/2024    TRIG 113 04/17/2024     Lab Results   Component Value Date    NONHDLC 102 12/17/2024         Lab Results   Component Value Date    HGBA1C 6.4 (H) 02/15/2025         Imaging Studies:     Echocardiogram: 5/17/25         Left Ventricle: Left ventricular cavity size is upper normal. Wall thickness is increased. There is moderate concentric hypertrophy. The left ventricular ejection fraction is 45-50% visually and 53% on biplane measurement.  Definity was used to visualize LV cavity.  Systolic function is low normal. There is mild global hypokinesis. Diastolic function is severely abnormal, consistent with ungraded restrictive relaxation.    IVS: There is abnormal septal motion consistent with right ventricular pacing.    Right Ventricle: Systolic function is normal.    Left Atrium: The atrium is severely dilated (>48 mL/m2).    Right Atrium: The atrium is mildly dilated.    Aortic Valve: The aortic valve is trileaflet. The leaflets are moderately thickened. The leaflets are moderately calcified. There is severely reduced mobility. There is mild regurgitation. There is severe stenosis. The aortic valve mean gradient is 27 mmHg, peak gradient is 43 mmHg. The dimensionless velocity index  is 0.16. The aortic valve area is 0.65 cm2.    Mitral Valve: There is mild annular calcification. There is moderate regurgitation.    Tricuspid Valve: There is mild to moderate regurgitation. The right ventricular systolic pressure is mildly elevated at 39 mmHg.    Prior TTE study available for comparison. Prior study date: 12/13/2024. Changes noted when compared to prior study. Changes include: Degree of aortic stenosis has increased to severe.  Possible slight decrease in LV systolic function/EF.  Slight increase in mitral regurgitation and LA size..        Findings    Left Ventricle Left ventricular cavity size is upper normal. Wall thickness is increased. There is moderate concentric hypertrophy. The left ventricular ejection fraction is 45-50% visually and 53% on biplane measurement.  Definity was used to visualize LV cavity. Systolic function is low normal. There is mild global hypokinesis. Diastolic function is severely abnormal, consistent with ungraded restrictive relaxation.   Interventricular Septum There is abnormal septal motion consistent with right ventricular pacing.   Right Ventricle Right ventricular cavity size is normal. Systolic function is normal. Wall thickness is normal.   Left Atrium The atrium is severely dilated (>48 mL/m2).   Right Atrium The atrium is mildly dilated.   Aortic Valve The aortic valve is trileaflet. The leaflets are moderately thickened. The leaflets are moderately calcified. There is severely reduced mobility. There is mild regurgitation. There is severe stenosis. The aortic valve mean gradient is 27 mmHg, peak gradient is 43 mmHg. The dimensionless velocity index is 0.16. The aortic valve area is 0.65 cm2.   Mitral Valve There is mild thickening. There is mild annular calcification.  There is moderate regurgitation. There is no evidence of stenosis.   Tricuspid Valve Tricuspid valve structure is normal. There is mild to moderate regurgitation. There is no evidence of  stenosis. The right ventricular systolic pressure is mildly elevated at 39 mmHg.   Pulmonic Valve Pulmonic valve structure is normal. There is no evidence of regurgitation. There is no evidence of stenosis.   Ascending Aorta The aortic root is normal in size.   IVC/SVC The inferior vena cava is not well visualized.   Pericardium There is no pericardial effusion. The pericardium is normal in appearance.     Left Ventricle Measurements    Function/Volumes   A4C EF 67 %         LVOT stroke volume 45.93         LVOT stroke volume index 19.8 ml/m2         Left ventricular stroke volume (2D) 50 mL         LV Diastolic Volume (BP) 184 mL         LV Systolic Volume (BP) 87 mL         EF 53 %         LVOT Cardiac Output 3.23 l/min         LVOT Cardiac Index 1.42 l/min/m2         Dimensions   LVIDd 5.7 cm         LVIDS 4.9 cm         IVSd 1.5 cm         LVPWd 1.2 cm         LVOT area 4.15 cm2         FS 14         Diastolic Filling   MV E' Tissue Velocity Septal 5 cm/s         LA Volume Index (BP) 48.5 mL/m2         E/A ratio 2.49         E wave deceleration time 157 ms         MV Peak E Robert 122 cm/s         MV Peak A Robret 0.49 m/s          Report Measurements   AV LVOT peak gradient 1 mmHg              Interventricular Septum Measurements    Shunt Ratio   LVOT peak VTI 11.06 cm         LVOT peak robert 0.51 m/s              Right Ventricle Measurements    Dimensions   RVID d 3.6 cm         Tricuspid annular plane systolic excursion 1.8 cm               Left Atrium Measurements    Dimensions   LA size 5.4 cm         LA length (A2C) 7.2 cm         Volumes   LA volume (BP) 110 mL         LA Volume Index (BP) 48.5 mL/m2               Right Atrium Measurements    Dimensions   RAA A4C 19.5 cm2               Atrial Septum Measurements    Shunt Ratio   LVOT peak VTI 11.06 cm         LVOT peak robert 0.51 m/s               Aortic Valve Measurements    Stenosis   Aortic valve peak velocity 3.26 m/s         LVOT peak robert 0.51 m/s         Ao  VTI 71.02 cm         LVOT peak VTI 11.06 cm         AV mean gradient 27 mmHg         LVOT mn grad 0 mmHg         AV peak gradient 43 mmHg         AV LVOT peak gradient 1 mmHg         Area/Dimensions   DVI 0.16         AV valve area 0.65 cm2         AV area by cont VTI 0.6 cm2         AV area peak robert 0.6 cm2         LVOT diameter 2.3 cm         LVOT area 4.15 cm2               Mitral Valve Measurements    Stenosis   MV stenosis pressure 1/2 time 46 ms         MV valve area p 1/2 method 4.78               Tricuspid Valve Measurements    RVSP Parameters   TR Peak Robert 2.9 m/s         Triscuspid Valve Regurgitation Peak Gradient 34 mmHg               Aorta Measurements    Aortic Dimensions   Ao root 4.3 cm               CT c/a/p w/ contrast: 4/14/25    IMPRESSION:     1. No acute findings in the chest, abdomen, or pelvis.     2. Left lower lobe solid nodule measuring 1.5 cm, stable from 9/6/2024, previously 0.8 cm on 8/2/2022. Recommend PET/CT and/or tissue sampling if not already performed.     3. 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 Recommendation is for follow-up low radiation dose chest CT in one year.     4. Post EVAR. Unchanged infrarenal abdominal aortic aneurysm sac measuring up to 5.3 cm.     5. Pancreatic cystic lesions measuring up to 1.4 cm.    Lower Extremity Arterial Duplex: 5/13/25    RIGHT LOWER LIMB:   Evidence of 50-75% stenosis in the superficial femoral, mid femoral and distal popliteal arteries.   Evidence suggestive of tibioperoneal occlusive disease.   The femoral to popliteal artery bypass graft was not visualized.  Ankle/Brachial Index: 0.87 which is mild range. (Prior: 0.77).   PVR/ PPG tracings are dampened.   Metatarsal pressure of 95 mmHg.   Great toe pressure of 54 mmHg, within the healing range.      LEFT LOWER LIMB:   Evidence of >75% stenosis at the tibioperoneal trunk/distal anastomosis  of the distal superficial femoral- below knee popliteal  bypass graft.  Evidence suggestive of tibioperoneal occlusive disease.   Ankle/Brachial Index: 0.90 which is normal range. (Prior:0.74).   PVR/ PPG tracings are dampened.   Metatarsal pressure of 104 mmHg.   Great toe pressure of 87 mmHg, within the healing range.        Results Review Statement: I personally reviewed the following image studies in PACS and associated radiology reports: Echo, CT c/a/p and LEAD. My interpretation of the radiology images/reports is: AS.    TAVR evaluation Assessment:     [facility]: III    Aortic Stenosis Stage: D3    5 Meter Walk: 6 sec, 7 sec, 7 sec    STS risk score (preliminary): 4.8%    KCCQ-12 completed    Assessment:  Patient Active Problem List    Diagnosis Date Noted    Abnormal CT scan 05/15/2025    Constipation 02/21/2025    Gram-positive bacteremia 01/05/2025    Acute metabolic encephalopathy 01/05/2025    Bacteremia 01/04/2025    S/P placement of cardiac pacemaker 12/20/2024    Bradycardia 12/12/2024    Multiple open wounds of lower extremity 12/12/2024    Ulcer of right foot (HCC) 09/07/2024    SOB (shortness of breath) 09/06/2024    Nausea 09/06/2024    Elevated troponin 09/06/2024    History of DVT (deep vein thrombosis) 08/26/2024    Plantar fasciitis, right 07/10/2024    Acute deep vein thrombosis (DVT) of left peroneal vein (HCC) 05/01/2024    Iron deficiency anemia 04/23/2024    Shortness of breath 04/09/2024    History of colon polyps 02/22/2024    Duodenal ulcer 02/01/2024    Multiple gastric ulcers 12/27/2023    Iron deficiency anemia due to chronic blood loss 12/27/2023    Melena 12/26/2023    Symptomatic anemia 12/26/2023    Frequent PVCs 06/17/2023    Chronic diastolic congestive heart failure (HCC) 06/16/2023    Acute kidney injury superimposed on stage 3b chronic kidney disease (HCC) 06/16/2023    Hypertensive urgency 05/11/2023    Elevated troponin level not due myocardial infarction 05/11/2023    Stable angina pectoris (HCC) 03/07/2023    Chronic kidney  disease-mineral and bone disorder 11/30/2022    Severe aortic valve stenosis 11/11/2022    Gross hematuria 07/26/2022    Platelets decreased ([facility]) 07/22/2022    Actinic keratoses 01/14/2022    Type 2 diabetes mellitus with diabetic peripheral angiopathy without gangrene, with long-term current use of insulin ([facility]) 01/11/2022    Varicose veins of left lower extremity 06/25/2021    History of endovascular stent graft for abdominal aortic aneurysm (AAA) 06/25/2021    Bruit (arterial) 06/25/2021    PAD (peripheral artery disease) (HCC) 06/25/2021    Type 2 diabetes mellitus with diabetic polyneuropathy, with long-term current use of insulin ([facility]) 06/10/2021    Mixed hyperlipidemia 05/11/2021    Primary hypertension 05/11/2021    Coronary artery disease involving native coronary artery of native heart without angina pectoris 05/11/2021    S/P angioplasty with stent 05/11/2021    Hx of CABG 05/11/2021    S/P AAA repair 05/11/2021    S/P prostatectomy 05/11/2021    H/O prostate cancer 05/11/2021     Cardiology notes reviewed    Impression/Plan:    Thomas Horne has symptomatic severe aortic stenosis. He will undergo the following testing for transcatheter aortic valve replacement: Gated CTA of the chest/abdomen/pelvis, cardiac catheterization, dental clearance and carotid artery ultrasound.      Once these studies have been completed, Thomas Horne will follow up in our office to review the results and to be evaluated to confirm the suitability of proceeding with transcatheter aortic valve replacment.     Thomas Horne was comfortable with our recommendations, and their questions were answered to their satisfaction.    Thank you for allowing us to participate in the care of this patient.     Routine referral to gastroenterology for colonoscopy screening was not indicated, as the patient is over 73 years old    SIGNATURE: APOLLO Trotter  DATE: Zahra 3, 2025  TIME: 11:48 AM         [1]   Social History  Tobacco Use    Smoking Status Former    Current packs/day: 0.00    Average packs/day: 1.5 packs/day for 35.0 years (52.5 ttl pk-yrs)    Types: Cigarettes    Start date: 1956    Quit date:     Years since quittin.4    Passive exposure: Past   Smokeless Tobacco Never

## 2025-06-04 ENCOUNTER — TELEPHONE (OUTPATIENT)
Dept: CARDIOLOGY CLINIC | Facility: CLINIC | Age: 80
End: 2025-06-04

## 2025-06-04 DIAGNOSIS — Z95.1 HX OF CABG: ICD-10-CM

## 2025-06-04 DIAGNOSIS — I95.1 ORTHOSTATIC HYPOTENSION: ICD-10-CM

## 2025-06-04 DIAGNOSIS — I35.0 NONRHEUMATIC AORTIC VALVE STENOSIS: ICD-10-CM

## 2025-06-04 DIAGNOSIS — I25.10 CORONARY ARTERY DISEASE INVOLVING NATIVE CORONARY ARTERY OF NATIVE HEART WITHOUT ANGINA PECTORIS: ICD-10-CM

## 2025-06-04 DIAGNOSIS — I35.0 SEVERE AORTIC VALVE STENOSIS: Primary | ICD-10-CM

## 2025-06-04 NOTE — TELEPHONE ENCOUNTER
Attempted to contact pt as well. No answer, and no voicemail box available. Will forward to clerical to attempt to schedule apt.

## 2025-06-04 NOTE — TELEPHONE ENCOUNTER
Caller: Whitley Hodges    Doctor: Dr. Bell    Reason for call: Patients daughter called back in regards to voicemail left. She stated that the appointment on Monday is okay. I confirmed it. She was wondering why she was being called I explained they did reach out to patient but was unable to leave voicemail. All is good to go and he will be there on Monday. Thank you    Call back#: [phone]

## 2025-06-04 NOTE — TELEPHONE ENCOUNTER
Patient called office back with confusion.  He recently saw Irish in [place], with results an hospital follow up addressed.   He was seen by CV surgery yesterday and testing was ordered.   Patient cancelled appt for Monday as he has already addressed the stenosis.   Patient is scheduled for six month appt as recommended by Irish.

## 2025-06-04 NOTE — TELEPHONE ENCOUNTER
DR. JOHN IS REQUESTING THIS BE SCHEDULED AT [facility].      Patient scheduled for RIGHT & LEFT  Heart Cath on 6/20/25 at [facility] with Dr. RICHARD.      Instructions sent to patient through [facility].      Patient aware of all general instructions.    Medication holds:     Torsemide - Do not take morning of procedure.     Jardiance - Do not take for 4 days before and morning of procedure.     Metformin - Take your regular dose day/evening before procedure and do not take morning of procedure.      Januvia - Take your regular dose day/evening before your procedure and do not take morning of procedure.      Lantus - Take your regular dose as you normally would either the evening before or the morning of your procedure.        Labs to be done NO LATER THAN 6/13/25   CMP / CBC (fasting 8 hours)           Dr. Pereira, can you please place prep for procedure.   Thank you,   Cinthya

## 2025-06-04 NOTE — TELEPHONE ENCOUNTER
----- Message from Michell BARRETT sent at 6/3/2025  3:51 PM EDT -----  Regarding: Cath  Please schedule patient for: Pre TAVR Cardiac Cath: to be scheduled in the next few weeks. Patient has [employer] as primary insurance and had recent bloodworkDRLaura JOHN IS REQUESTING THIS BE SCHEDULED AT [facility].Please schedule with either Dr. Bacon, Dr. Lubin, Dr. Mclean or Dr. Parks To be done at: [facility] To be done by: Please address any questions regarding this request to Michell Mercado or Tiffanie Garcia, Thank you.

## 2025-06-04 NOTE — TELEPHONE ENCOUNTER
----- Message from Naun Bell MD sent at 6/4/2025 10:22 AM EDT -----  Regarding: RE: Cath  I called the patient's phone number.  No one picked up and there was no option to leave a message.  Please advise the patient to follow-up in the office.  His recent echocardiogram did ordered by Dr. Castellanos shows that his aortic valve may be tight enough to require surgical treatment.  Prior to that he needs to have a heart catheterization.  He can call us to discuss this on the phone or can make an appointment to see one of the cardiologists in the office.  ----- Message -----  From: Michell Montenegro  Sent: 6/3/2025   3:53 PM EDT  To: Tiffanie Garcia RN; Cinthya Valentine; Cynthia Duncan, #  Subject: Cath                                             Please schedule patient for: Pre TAVR Cardiac Cath: to be scheduled in the next few weeks. Patient has [employer] as primary insurance and had recent bloodworkDRLaura JOHN IS REQUESTING THIS BE SCHEDULED AT [facility].Please schedule with either Dr. Bacon, Dr. Lubin, Dr. Mclean or Dr. Parks To be done at: [facility] To be done by: Please address any questions regarding this request to Michell Mercado or Tiffanie Garcia, Thank you.

## 2025-06-05 ENCOUNTER — PREP FOR PROCEDURE (OUTPATIENT)
Dept: CARDIOLOGY CLINIC | Facility: CLINIC | Age: 80
End: 2025-06-05

## 2025-06-05 DIAGNOSIS — I35.0 NONRHEUMATIC AORTIC VALVE STENOSIS: Primary | ICD-10-CM

## 2025-06-05 NOTE — TELEPHONE ENCOUNTER
Cinthya is off for the rest of the day.  Order update to be done as R+C required for pre TAVR procedure.

## 2025-06-05 NOTE — TELEPHONE ENCOUNTER
I just spoke to Thomas. I offered to schedule his cath here at [facility]. He tells me that he would actually prefer to have it done at [facility], and he asked if the test could be done at [facility] and whoever he met with told him no that all the tests must be done up at [place]. His cell number is [phone] if you wanted to give him a call. Thanks Alisia

## 2025-06-11 ENCOUNTER — HOSPITAL ENCOUNTER (OUTPATIENT)
Dept: RADIOLOGY | Facility: HOSPITAL | Age: 80
Discharge: HOME | End: 2025-06-11
Attending: NURSE PRACTITIONER
Payer: COMMERCIAL

## 2025-06-11 ENCOUNTER — APPOINTMENT (OUTPATIENT)
Dept: LAB | Facility: CLINIC | Age: 80
End: 2025-06-11
Attending: INTERNAL MEDICINE
Payer: COMMERCIAL

## 2025-06-11 ENCOUNTER — HOSPITAL ENCOUNTER (OUTPATIENT)
Dept: NON INVASIVE DIAGNOSTICS | Facility: HOSPITAL | Age: 80
Discharge: HOME | End: 2025-06-11
Attending: NURSE PRACTITIONER
Payer: COMMERCIAL

## 2025-06-11 DIAGNOSIS — I95.1 ORTHOSTATIC HYPOTENSION: ICD-10-CM

## 2025-06-11 DIAGNOSIS — I35.0 SEVERE AORTIC VALVE STENOSIS: ICD-10-CM

## 2025-06-11 DIAGNOSIS — Z95.0 S/P PLACEMENT OF CARDIAC PACEMAKER: ICD-10-CM

## 2025-06-11 DIAGNOSIS — I73.9 PAD (PERIPHERAL ARTERY DISEASE) (HCC): ICD-10-CM

## 2025-06-11 DIAGNOSIS — Z95.1 HX OF CABG: ICD-10-CM

## 2025-06-11 DIAGNOSIS — Z95.828 HISTORY OF ENDOVASCULAR STENT GRAFT FOR ABDOMINAL AORTIC ANEURYSM (AAA): ICD-10-CM

## 2025-06-11 DIAGNOSIS — Z95.820 S/P ANGIOPLASTY WITH STENT: ICD-10-CM

## 2025-06-11 DIAGNOSIS — I35.0 NONRHEUMATIC AORTIC VALVE STENOSIS: ICD-10-CM

## 2025-06-11 DIAGNOSIS — Z13.6 ENCOUNTER FOR SCREENING FOR STENOSIS OF CAROTID ARTERY: ICD-10-CM

## 2025-06-11 DIAGNOSIS — I25.10 CORONARY ARTERY DISEASE INVOLVING NATIVE CORONARY ARTERY OF NATIVE HEART WITHOUT ANGINA PECTORIS: ICD-10-CM

## 2025-06-11 LAB
ALBUMIN SERPL BCG-MCNC: 4.2 G/DL (ref 3.5–5)
ALP SERPL-CCNC: 46 U/L (ref 34–104)
ALT SERPL W P-5'-P-CCNC: 15 U/L (ref 7–52)
ANION GAP SERPL CALCULATED.3IONS-SCNC: 10 MMOL/L (ref 4–13)
AST SERPL W P-5'-P-CCNC: 22 U/L (ref 13–39)
BASOPHILS # BLD AUTO: 0.03 THOUSANDS/ÂΜL (ref 0–0.1)
BASOPHILS NFR BLD AUTO: 0 % (ref 0–1)
BILIRUB SERPL-MCNC: 0.69 MG/DL (ref 0.2–1)
BUN SERPL-MCNC: 31 MG/DL (ref 5–25)
CALCIUM SERPL-MCNC: 9.4 MG/DL (ref 8.4–10.2)
CHLORIDE SERPL-SCNC: 103 MMOL/L (ref 96–108)
CO2 SERPL-SCNC: 29 MMOL/L (ref 21–32)
CREAT SERPL-MCNC: 1.59 MG/DL (ref 0.6–1.3)
EOSINOPHIL # BLD AUTO: 0.17 THOUSAND/ÂΜL (ref 0–0.61)
EOSINOPHIL NFR BLD AUTO: 2 % (ref 0–6)
ERYTHROCYTE [DISTWIDTH] IN BLOOD BY AUTOMATED COUNT: 14.9 % (ref 11.6–15.1)
GFR SERPL CREATININE-BSD FRML MDRD: 39 ML/MIN/1.73SQ M
GLUCOSE P FAST SERPL-MCNC: 136 MG/DL (ref 65–99)
HCT VFR BLD AUTO: 45.7 % (ref 36.5–49.3)
HGB BLD-MCNC: 14 G/DL (ref 12–17)
IMM GRANULOCYTES # BLD AUTO: 0.01 THOUSAND/UL (ref 0–0.2)
IMM GRANULOCYTES NFR BLD AUTO: 0 % (ref 0–2)
LYMPHOCYTES # BLD AUTO: 1.4 THOUSANDS/ÂΜL (ref 0.6–4.47)
LYMPHOCYTES NFR BLD AUTO: 19 % (ref 14–44)
MCH RBC QN AUTO: 27.1 PG (ref 26.8–34.3)
MCHC RBC AUTO-ENTMCNC: 30.6 G/DL (ref 31.4–37.4)
MCV RBC AUTO: 89 FL (ref 82–98)
MONOCYTES # BLD AUTO: 0.54 THOUSAND/ÂΜL (ref 0.17–1.22)
MONOCYTES NFR BLD AUTO: 7 % (ref 4–12)
NEUTROPHILS # BLD AUTO: 5.23 THOUSANDS/ÂΜL (ref 1.85–7.62)
NEUTS SEG NFR BLD AUTO: 72 % (ref 43–75)
NRBC BLD AUTO-RTO: 0 /100 WBCS
PLATELET # BLD AUTO: 226 THOUSANDS/UL (ref 149–390)
PMV BLD AUTO: 12 FL (ref 8.9–12.7)
POTASSIUM SERPL-SCNC: 3.9 MMOL/L (ref 3.5–5.3)
PROT SERPL-MCNC: 7.6 G/DL (ref 6.4–8.4)
RBC # BLD AUTO: 5.16 MILLION/UL (ref 3.88–5.62)
SODIUM SERPL-SCNC: 142 MMOL/L (ref 135–147)
WBC # BLD AUTO: 7.38 THOUSAND/UL (ref 4.31–10.16)

## 2025-06-11 PROCEDURE — 93880 EXTRACRANIAL BILAT STUDY: CPT

## 2025-06-11 PROCEDURE — 75572 CT HRT W/3D IMAGE: CPT

## 2025-06-11 PROCEDURE — 74174 CTA ABD&PLVS W/CONTRAST: CPT

## 2025-06-11 PROCEDURE — 85025 COMPLETE CBC W/AUTO DIFF WBC: CPT

## 2025-06-11 PROCEDURE — 80053 COMPREHEN METABOLIC PANEL: CPT

## 2025-06-11 PROCEDURE — 36415 COLL VENOUS BLD VENIPUNCTURE: CPT

## 2025-06-11 RX ADMIN — IOHEXOL 100 ML: 350 INJECTION, SOLUTION INTRAVENOUS at 11:00

## 2025-06-12 PROCEDURE — 93880 EXTRACRANIAL BILAT STUDY: CPT | Performed by: SURGERY

## 2025-06-19 ENCOUNTER — PROCEDURE VISIT (OUTPATIENT)
Age: 80
End: 2025-06-19
Payer: COMMERCIAL

## 2025-06-19 ENCOUNTER — TELEPHONE (OUTPATIENT)
Age: 80
End: 2025-06-19

## 2025-06-19 VITALS — BODY MASS INDEX: 29 KG/M2 | RESPIRATION RATE: 17 BRPM | WEIGHT: 226 LBS | HEIGHT: 74 IN

## 2025-06-19 DIAGNOSIS — M77.41 METATARSALGIA OF BOTH FEET: ICD-10-CM

## 2025-06-19 DIAGNOSIS — E11.42 DIABETIC POLYNEUROPATHY ASSOCIATED WITH TYPE 2 DIABETES MELLITUS (HCC): ICD-10-CM

## 2025-06-19 DIAGNOSIS — I70.209 PERIPHERAL ARTERIOSCLEROSIS (HCC): ICD-10-CM

## 2025-06-19 DIAGNOSIS — M77.42 METATARSALGIA OF BOTH FEET: ICD-10-CM

## 2025-06-19 DIAGNOSIS — E11.621 DIABETIC ULCER OF TOE OF RIGHT FOOT ASSOCIATED WITH TYPE 2 DIABETES MELLITUS, LIMITED TO BREAKDOWN OF SKIN (HCC): Primary | ICD-10-CM

## 2025-06-19 DIAGNOSIS — L97.511 DIABETIC ULCER OF TOE OF RIGHT FOOT ASSOCIATED WITH TYPE 2 DIABETES MELLITUS, LIMITED TO BREAKDOWN OF SKIN (HCC): Primary | ICD-10-CM

## 2025-06-19 DIAGNOSIS — M54.16 RADICULOPATHY OF LUMBAR REGION: ICD-10-CM

## 2025-06-19 PROCEDURE — 99213 OFFICE O/P EST LOW 20 MIN: CPT | Performed by: PODIATRIST

## 2025-06-19 NOTE — PROGRESS NOTES
Assessment/Plan: Patient with neuropathy and severe peripheral artery disease.  Preulcerative/[place] grade 0 ulcers of foot.  Metatarsalgia.  History of radiculopathy.  Acquired deformity foot bilateral.    Plan.  Chart reviewed.  PCP notes reviewed.  Patient evaluated.  Patient advised on diabetic footcare.  Watch for signs of infection.  Nail cutting instruction given.  Continue moisturization after bathing.  Follow-up with vascular surgery.  Remain on Lyrica as directed.  Aftercare instruction given.  Return for follow-up.  In addition we will add alpha lipoic acid.         Diagnoses and all orders for this visit:    Diabetic ulcer of toe of right foot associated with type 2 diabetes mellitus, limited to breakdown of skin (HCC)    Diabetic polyneuropathy associated with type 2 diabetes mellitus (HCC)    Peripheral arteriosclerosis (HCC)    Radiculopathy of lumbar region    Metatarsalgia of both feet          Subjective: Patient presents for pedal evaluation.  Patient is diabetic with known PAD.    Allergies   Allergen Reactions    Lisinopril Rash and Lip Swelling       Current Medications[1]    Problem List[2]       Patient ID: Thomas Horne is a 80 y.o. male.    HPI    The following portions of the patient's history were reviewed and updated as appropriate:     family history includes Alcohol abuse in his father; Cancer in his brother, brother, sister, and sister; Colon cancer in his brother, sister, sister, and sister; Diabetes in his mother and sister; Heart disease in his brother; Thyroid disease in his brother and sister; Varicose Veins in his mother.      reports that he quit smoking about 37 years ago. His smoking use included cigarettes. He started smoking about 69 years ago. He has a 52.5 pack-year smoking history. He has been exposed to tobacco smoke. He has never used smokeless tobacco. He reports current alcohol use of about 14.0 standard drinks of alcohol per week. He reports that he does not use  drugs.    Vitals:    06/19/25 1050   Resp: 17       Review of Systems      Objective:  Patient's shoes and socks removed.   Foot ExamPhysical Exam        General  General Appearance: appears stated age and healthy   Orientation: alert and oriented to person, place, and time   Affect: appropriate   Gait: antalgic         Right Foot/Ankle      Inspection and Palpation  Swelling: dorsum   Arch: pes cavus  Hallux limitus: yes  Skin Exam: dry skin;      Neurovascular  Dorsalis pedis: 1+  Posterior tibial: 1+  Saphenous nerve sensation: diminished  Tibial nerve sensation: diminished  Superficial peroneal nerve sensation: diminished  Deep peroneal nerve sensation: diminished  Sural nerve sensation: diminished        Left Foot/Ankle       Inspection and Palpation  Tenderness: bony tenderness and calcaneus tenderness   Swelling: dorsum   Arch: pes cavus  Hallux limitus: yes  Skin Exam: dry skin;      Neurovascular  Dorsalis pedis: 1+  Posterior tibial: 1+  Saphenous nerve sensation: diminished  Tibial nerve sensation: diminished  Superficial peroneal nerve sensation: diminished  Deep peroneal nerve sensation: diminished  Sural nerve sensation: diminished        Physical Exam  Vitals and nursing note reviewed.   Constitutional:       Appearance: Normal appearance.   Cardiovascular:      Rate and Rhythm: Normal rate and regular rhythm.      Pulses: Pulses are weak.           Dorsalis pedis pulses are 1+ on the right side and 1+ on the left side.        Posterior tibial pulses are 1+ on the right side and 1+ on the left side.   Musculoskeletal:      Left foot: Bony tenderness present.   Feet:      Right foot:      Skin integrity: Dry skin present.      Left foot:      Skin integrity: Dry skin present.      Comments: Patient has 2 preulcerative lesions right hallux.  Distal aspect with callus plantar medial IPJ with intradermal dried hemorrhage.  Negative cellulitis.  Skin:     Capillary Refill: Capillary refill takes 2 to 3  seconds.   Psychiatric:         Mood and Affect: Mood normal.         Behavior: Behavior normal.         Thought Content: Thought content normal.         Judgment: Judgment normal.      Patient's shoes and socks removed.     Right Foot/Ankle   Right Foot Inspection  Skin Exam: dry skin.      Toe Exam: tenderness and right toe deformity.      Sensory   Vibration: absent  Proprioception: diminished  Monofilament testing: diminished     Vascular  Capillary refills: < 3 seconds  The right DP pulse is 1+. The right PT pulse is 1+.      Right Toe  - Comprehensive Exam  Arch: pes cavus  Hallux limitus: yes  Swelling: dorsum         Left Foot/Ankle  Left Foot Inspection  Skin Exam: dry skin.      Toe Exam: tenderness and left toe deformity.      Sensory   Vibration: absent  Proprioception: diminished  Monofilament testing: diminished     Vascular  Capillary refills: < 3 seconds  The left DP pulse is 1+. The left PT pulse is 1+.      Left Toe  - Comprehensive Exam  Arch: pes cavus  Hallux limitus: yes  Swelling: dorsum   Tenderness: bony tenderness and calcaneus tenderness         Assign Risk Category  Deformity present  Loss of protective sensation  Weak pulses  Risk: 2                   [1]   Current Outpatient Medications:     acetaminophen (TYLENOL) 325 mg tablet, Take 2 tablets (650 mg total) by mouth every 6 (six) hours as needed for mild pain, Disp: , Rfl:     atorvastatin (LIPITOR) 40 mg tablet, Take 1 tablet (40 mg total) by mouth daily with dinner, Disp: 90 tablet, Rfl: 1    Blood Glucose Monitoring Suppl (OneTouch Verio Reflect) w/Device KIT, Check blood sugars twice daily. Please substitute with appropriate alternative as covered by patient's insurance. Dx: E11.65, Disp: 1 kit, Rfl: 0    clopidogrel (PLAVIX) 75 mg tablet, Take 1 tablet (75 mg total) by mouth daily, Disp: 30 tablet, Rfl: 5    DULoxetine (CYMBALTA) 30 mg delayed release capsule, Take 1 capsule (30 mg total) by mouth daily, Disp: 90 capsule, Rfl:  0    Empagliflozin (Jardiance) 25 MG TABS, TAKE 1 TABLET BY MOUTH DAILY, Disp: 90 tablet, Rfl: 1    famotidine (PEPCID) 40 MG tablet, Take 1 tablet (40 mg total) by mouth daily at bedtime, Disp: 90 tablet, Rfl: 1    fenofibrate 160 MG tablet, TAKE 1 TABLET BY MOUTH DAILY, Disp: 90 tablet, Rfl: 3    ferrous sulfate 324 (65 Fe) mg, Take 1 tablet (324 mg total) by mouth every other day, Disp: , Rfl:     glucose blood (OneTouch Verio) test strip, TEST TWICE A DAY, Disp: 600 strip, Rfl: 5    insulin glargine (LANTUS) 100 units/mL subcutaneous injection, Inject 15 Units under the skin daily at bedtime, Disp: 13.5 mL, Rfl: 3    Januvia 100 MG tablet, TAKE ONE TABLET BY MOUTH DAILY, Disp: 90 tablet, Rfl: 3    metFORMIN (GLUCOPHAGE) 500 mg tablet, TAKE 1 TABLET BY MOUTH TWICE  DAILY WITH MEALS, Disp: 180 tablet, Rfl: 1    Multiple Vitamins-Minerals (MULTIVITAMIN MEN 50+ PO), Take by mouth in the morning., Disp: , Rfl:     nitroglycerin (NITROSTAT) 0.4 mg SL tablet, Place 1 tablet (0.4 mg total) under the tongue every 5 (five) minutes as needed for chest pain, Disp: 30 tablet, Rfl: 0    ondansetron (ZOFRAN) 4 mg tablet, Take 1 tablet (4 mg total) by mouth every 12 (twelve) hours as needed for nausea or vomiting, Disp: 30 tablet, Rfl: 0    OneTouch Delica Lancets 33G MISC, Check blood sugars twice daily. Please substitute with appropriate alternative as covered by patient's insurance. Dx: E11.65, Disp: 200 each, Rfl: 3    pantoprazole (PROTONIX) 40 mg tablet, TAKE 1 TABLET BY MOUTH DAILY, Disp: 90 tablet, Rfl: 1    pentoxifylline (TRENtal) 400 mg ER tablet, TAKE 1 TABLET BY MOUTH 3 TIMES  DAILY WITH MEALS, Disp: 270 tablet, Rfl: 3    pregabalin (LYRICA) 150 mg capsule, Take 1 capsule (150 mg total) by mouth 3 (three) times a day, Disp: 270 capsule, Rfl: 0    ranolazine (RANEXA) 500 mg 12 hr tablet, Take 1 tablet (500 mg total) by mouth 2 (two) times a day, Disp: 60 tablet, Rfl: 0    Sure Comfort Pen Needles 32G X 4 MM MISC,  TAKE ONE TABLET BY MOUTH DAILY, Disp: 100 each, Rfl: 5    torsemide (DEMADEX) 20 mg tablet, Take 20 mg by mouth daily Takes every other day; PRN (Patient taking differently: Take 20 mg by mouth if needed Takes every other day; PRN), Disp: , Rfl:   [2]   Patient Active Problem List  Diagnosis    Mixed hyperlipidemia    Primary hypertension    Coronary artery disease involving native coronary artery of native heart without angina pectoris    S/P angioplasty with stent    Hx of CABG    S/P AAA repair    S/P prostatectomy    H/O prostate cancer    Type 2 diabetes mellitus with diabetic polyneuropathy, with long-term current use of insulin ([facility])    Varicose veins of left lower extremity    History of endovascular stent graft for abdominal aortic aneurysm (AAA)    Bruit (arterial)    PAD (peripheral artery disease) ([facility])    Type 2 diabetes mellitus with diabetic peripheral angiopathy without gangrene, with long-term current use of insulin ([facility])    Actinic keratoses    Platelets decreased ([facility])    Gross hematuria    Severe aortic valve stenosis    Chronic kidney disease-mineral and bone disorder    Stable angina pectoris ([facility])    Hypertensive urgency    Elevated troponin level not due myocardial infarction    Chronic diastolic congestive heart failure ([facility])    Frequent PVCs    Melena    Symptomatic anemia    Duodenal ulcer    History of colon polyps    Shortness of breath    Acute deep vein thrombosis (DVT) of left peroneal vein ([facility])    History of DVT (deep vein thrombosis)    SOB (shortness of breath)    Elevated troponin    Bradycardia    S/P placement of cardiac pacemaker    Acute metabolic encephalopathy    Constipation    Abnormal CT scan

## 2025-06-20 ENCOUNTER — HOSPITAL ENCOUNTER (OUTPATIENT)
Facility: HOSPITAL | Age: 80
Setting detail: OUTPATIENT SURGERY
Discharge: HOME | End: 2025-06-20
Attending: INTERNAL MEDICINE | Admitting: INTERNAL MEDICINE
Payer: COMMERCIAL

## 2025-06-20 VITALS
DIASTOLIC BLOOD PRESSURE: 69 MMHG | BODY MASS INDEX: 28.23 KG/M2 | WEIGHT: 220 LBS | RESPIRATION RATE: 18 BRPM | TEMPERATURE: 97.9 F | OXYGEN SATURATION: 92 % | SYSTOLIC BLOOD PRESSURE: 113 MMHG | HEART RATE: 69 BPM | HEIGHT: 74 IN

## 2025-06-20 DIAGNOSIS — N18.30 STAGE 3 CHRONIC KIDNEY DISEASE, UNSPECIFIED WHETHER STAGE 3A OR 3B CKD (HCC): Primary | ICD-10-CM

## 2025-06-20 DIAGNOSIS — N18.32 CKD STAGE 3B, GFR 30-44 ML/MIN (HCC): ICD-10-CM

## 2025-06-20 DIAGNOSIS — I35.0 NONRHEUMATIC AORTIC VALVE STENOSIS: ICD-10-CM

## 2025-06-20 PROBLEM — D63.1 ANEMIA DUE TO STAGE 3B CHRONIC KIDNEY DISEASE  (HCC): Status: ACTIVE | Noted: 2025-06-20

## 2025-06-20 LAB
ANION GAP SERPL CALCULATED.3IONS-SCNC: 5 MMOL/L (ref 4–13)
BUN SERPL-MCNC: 27 MG/DL (ref 5–25)
CALCIUM SERPL-MCNC: 9.7 MG/DL (ref 8.4–10.2)
CHLORIDE SERPL-SCNC: 105 MMOL/L (ref 96–108)
CO2 SERPL-SCNC: 29 MMOL/L (ref 21–32)
CREAT SERPL-MCNC: 1.09 MG/DL (ref 0.6–1.3)
ERYTHROCYTE [DISTWIDTH] IN BLOOD BY AUTOMATED COUNT: 14.6 % (ref 11.6–15.1)
GFR SERPL CREATININE-BSD FRML MDRD: 62 ML/MIN/1.73SQ M
GLUCOSE P FAST SERPL-MCNC: 166 MG/DL (ref 65–99)
GLUCOSE SERPL-MCNC: 166 MG/DL (ref 65–140)
HCT VFR BLD AUTO: 45.8 % (ref 36.5–49.3)
HGB BLD-MCNC: 14.1 G/DL (ref 12–17)
MCH RBC QN AUTO: 27 PG (ref 26.8–34.3)
MCHC RBC AUTO-ENTMCNC: 30.8 G/DL (ref 31.4–37.4)
MCV RBC AUTO: 88 FL (ref 82–98)
PLATELET # BLD AUTO: 244 THOUSANDS/UL (ref 149–390)
PMV BLD AUTO: 11.7 FL (ref 8.9–12.7)
POTASSIUM SERPL-SCNC: 4.1 MMOL/L (ref 3.5–5.3)
RBC # BLD AUTO: 5.22 MILLION/UL (ref 3.88–5.62)
SODIUM SERPL-SCNC: 139 MMOL/L (ref 135–147)
WBC # BLD AUTO: 8.88 THOUSAND/UL (ref 4.31–10.16)

## 2025-06-20 PROCEDURE — 99215 OFFICE O/P EST HI 40 MIN: CPT | Performed by: INTERNAL MEDICINE

## 2025-06-20 PROCEDURE — 99152 MOD SED SAME PHYS/QHP 5/>YRS: CPT | Performed by: INTERNAL MEDICINE

## 2025-06-20 PROCEDURE — 93455 CORONARY ART/GRFT ANGIO S&I: CPT | Performed by: INTERNAL MEDICINE

## 2025-06-20 PROCEDURE — 93005 ELECTROCARDIOGRAM TRACING: CPT

## 2025-06-20 PROCEDURE — C1769 GUIDE WIRE: HCPCS | Performed by: INTERNAL MEDICINE

## 2025-06-20 PROCEDURE — 99153 MOD SED SAME PHYS/QHP EA: CPT | Performed by: INTERNAL MEDICINE

## 2025-06-20 PROCEDURE — C1887 CATHETER, GUIDING: HCPCS | Performed by: INTERNAL MEDICINE

## 2025-06-20 PROCEDURE — 85027 COMPLETE CBC AUTOMATED: CPT | Performed by: INTERNAL MEDICINE

## 2025-06-20 PROCEDURE — C1894 INTRO/SHEATH, NON-LASER: HCPCS | Performed by: INTERNAL MEDICINE

## 2025-06-20 PROCEDURE — 80048 BASIC METABOLIC PNL TOTAL CA: CPT | Performed by: NURSE PRACTITIONER

## 2025-06-20 RX ORDER — NITROGLYCERIN 20 MG/100ML
INJECTION INTRAVENOUS CODE/TRAUMA/SEDATION MEDICATION
Status: DISCONTINUED | OUTPATIENT
Start: 2025-06-20 | End: 2025-06-20 | Stop reason: HOSPADM

## 2025-06-20 RX ORDER — ASPIRIN 81 MG/1
324 TABLET, CHEWABLE ORAL ONCE
Status: COMPLETED | OUTPATIENT
Start: 2025-06-20 | End: 2025-06-20

## 2025-06-20 RX ORDER — FENTANYL CITRATE 50 UG/ML
INJECTION, SOLUTION INTRAMUSCULAR; INTRAVENOUS CODE/TRAUMA/SEDATION MEDICATION
Status: DISCONTINUED | OUTPATIENT
Start: 2025-06-20 | End: 2025-06-20 | Stop reason: HOSPADM

## 2025-06-20 RX ORDER — VERAPAMIL HYDROCHLORIDE 2.5 MG/ML
INJECTION INTRAVENOUS CODE/TRAUMA/SEDATION MEDICATION
Status: DISCONTINUED | OUTPATIENT
Start: 2025-06-20 | End: 2025-06-20 | Stop reason: HOSPADM

## 2025-06-20 RX ORDER — MIDAZOLAM HYDROCHLORIDE 2 MG/2ML
INJECTION, SOLUTION INTRAMUSCULAR; INTRAVENOUS CODE/TRAUMA/SEDATION MEDICATION
Status: DISCONTINUED | OUTPATIENT
Start: 2025-06-20 | End: 2025-06-20 | Stop reason: HOSPADM

## 2025-06-20 RX ORDER — SODIUM CHLORIDE 9 MG/ML
100 INJECTION, SOLUTION INTRAVENOUS CONTINUOUS
Status: DISPENSED | OUTPATIENT
Start: 2025-06-20 | End: 2025-06-20

## 2025-06-20 RX ORDER — LIDOCAINE HYDROCHLORIDE 10 MG/ML
INJECTION, SOLUTION EPIDURAL; INFILTRATION; INTRACAUDAL; PERINEURAL CODE/TRAUMA/SEDATION MEDICATION
Status: DISCONTINUED | OUTPATIENT
Start: 2025-06-20 | End: 2025-06-20 | Stop reason: HOSPADM

## 2025-06-20 RX ORDER — HEPARIN SODIUM 1000 [USP'U]/ML
INJECTION, SOLUTION INTRAVENOUS; SUBCUTANEOUS CODE/TRAUMA/SEDATION MEDICATION
Status: DISCONTINUED | OUTPATIENT
Start: 2025-06-20 | End: 2025-06-20 | Stop reason: HOSPADM

## 2025-06-20 RX ORDER — SODIUM CHLORIDE 9 MG/ML
75 INJECTION, SOLUTION INTRAVENOUS CONTINUOUS
Status: DISPENSED | OUTPATIENT
Start: 2025-06-20 | End: 2025-06-20

## 2025-06-20 RX ADMIN — SODIUM CHLORIDE 309 ML: 0.9 INJECTION, SOLUTION INTRAVENOUS at 08:44

## 2025-06-20 RX ADMIN — ASPIRIN 81 MG CHEWABLE TABLET 324 MG: 81 TABLET CHEWABLE at 08:45

## 2025-06-20 NOTE — INTERVAL H&P NOTE
Patient seen and examined at bedside.    Vitals:    06/20/25 0921   BP: 130/66   Pulse: 70   Resp: 16   Temp: 97.9 °F (36.6 °C)   SpO2: 93%       Lab Results   Component Value Date    WBC 8.88 06/20/2025    HGB 14.1 06/20/2025    HCT 45.8 06/20/2025    MCV 88 06/20/2025     06/20/2025       Lab Results   Component Value Date    SODIUM 139 06/20/2025    K 4.1 06/20/2025     06/20/2025    CO2 29 06/20/2025    BUN 27 (H) 06/20/2025    CREATININE 1.09 06/20/2025    GLUC 166 (H) 06/20/2025    CALCIUM 9.7 06/20/2025         Brief overview of procedure discussed with patient.  Indication, risk, benefits and alternatives discussed with patient who verbalized understanding.  All questions addressed fully.  Patient elected to proceed with planned procedure, consent completed.    Patient remains clinically stable.    We will proceed with planned procedure--C.    Latia Ambrocio 06/20/25 11:08 AM

## 2025-06-20 NOTE — CONSULTS
NEPHROLOGY HOSPITAL CONSULTATION   Thomas Horne 80 y.o. male MRN: [id number]  Unit/Bed#: [facility] RO[facility] Encounter: [id number]    Brief History of Admission -80-year-old male with a history of CKD, hypertension, diabetes, CAD, PAD who was found to have severe aortic stenosis and presents for TAVR workup.  Nephrology consulted for IVY risk reduction with setting of CKD    Assessment & Plan  CKD stage 3b, GFR 30-44 ml/min (HCC)  Etiology: Suspect secondary to diabetic kidney disease, hypertensive nephrosclerosis, cardiorenal syndrome  Baseline creatinine 1.6-1.8  Since last discharge patient has been off losartan and torsemide  Current creatinine today below baseline 1.09 likely due to being off losartan and torsemide  Last dose torsemide and Jardiance was Sunday  Plan:  Currently on IV fluids per cardiology protocol at 75 cc an hour  Recommend IV fluids at 75 cc an hour for 4 hours postprocedure then discontinue  risks of IVY with contrast exposure discussed with patient and wife at bedside and with creatinine below baseline patient okay to move forward with cardiac catheterization  Patient has a 26% risk of contrast nephropathy and a 1.1% risk of dialysis with contrast exposure in the setting of CKD stage IIIb  Continue to hold losartan and torsemide while admitted  Okay for torsemide every other day on discharge if patient with increased edema or shortness of breath  Recommend checking BMP 48 hours postprocedure on Monday  If remains hospitalized please check BMP in a.m.        HISTORY OF PRESENT ILLNESS:  Requesting Physician: Itzel Parks DO  Reason for Consult: IVY risk reduction recommendation in the setting of CKD    Thomas Horne is a 80 y.o. male past medical history of  CKD IIIB, hypertension, diabetes II, hyperlipidemia, CAD status post CABG current PCI, PAD status post multiple LE bypasses, AAA status post EVAR, neuropathy, prior foot ulcers, and severe aortic stenosis who presents for TAVR workup  and requires cardiac catheterization.  Nephrology consulted for IVY risk reduction recommendations in the setting of CKD.  Question, patient feeling well however has been having shortness of breath.  Reports taking torsemide every other day but has held it since Sunday.  Patient reports Jardiance last dose was Sunday as well.  Longer on losartan since discharge.  Patient normally follows with Dr. Fischer.       PAST MEDICAL HISTORY:  Past Medical History[1]    PAST SURGICAL HISTORY:  Past Surgical History[2]    ALLERGIES:  Allergies[3]    SOCIAL HISTORY:  Social History     Substance and Sexual Activity   Alcohol Use Yes    Alcohol/week: 14.0 standard drinks of alcohol    Types: 14 Cans of beer per week    Comment: occ     Social History     Substance and Sexual Activity   Drug Use Never     Tobacco Use History[4]    FAMILY HISTORY:  Family History[5]    MEDICATIONS:  Current Medications[6]    REVIEW OF SYSTEMS:  Constitutional: Negative for fatigue, anorexia, fever, chills, diaphoresis  HENT: Negative for postnasal drip  Eyes: Negative for visual disturbance.   Respiratory: can get SOB  Cardiovascular: Negative for chest pain, palpitations and leg swelling.   Gastrointestinal: Negative for abdominal pain, constipation, diarrhea, nausea and vomiting.   Genitourinary: No dysuria, hematuria  Endocrine: Negative for polyuria.   Musculoskeletal: Negative for arthralgias, back pain and joint swelling.   Skin: Negative for rash.   Neurological: Negative for focal weakness, headaches, dizziness.  Hematological: Negative for easy bruising or bleeding.  Psychiatric/Behavioral: Negative for confusion and sleep disturbance.   All the systems were reviewed and were negative except as documented on the HPI.    PHYSICAL EXAM:  Current Weight: Weight - Scale: 99.8 kg (220 lb)  First Weight: Weight - Scale: 99.8 kg (220 lb)  Vitals:    06/20/25 0921   BP: 130/66   BP Location: Right arm   Pulse: 70   Resp: 16   Temp: 97.9 °F (36.6  "°C)   TempSrc: Oral   SpO2: 93%   Weight: 99.8 kg (220 lb)   Height: 6' 2\" (1.88 m)     No intake or output data in the 24 hours ending 06/20/25 0946  Physical Exam  Vitals and nursing note reviewed.   Constitutional:       Appearance: Normal appearance.   HENT:      Head: Normocephalic and atraumatic.      Nose: Nose normal.      Mouth/Throat:      Mouth: Mucous membranes are moist.      Pharynx: Oropharynx is clear.     Eyes:      Extraocular Movements: Extraocular movements intact.      Conjunctiva/sclera: Conjunctivae normal.       Cardiovascular:      Rate and Rhythm: Normal rate and regular rhythm.      Pulses: Normal pulses.      Heart sounds: Murmur heard.   Pulmonary:      Effort: Pulmonary effort is normal.      Breath sounds: Normal breath sounds.   Abdominal:      Palpations: Abdomen is soft.     Musculoskeletal:         General: Normal range of motion.      Cervical back: Normal range of motion and neck supple.     Skin:     General: Skin is warm and dry.      Capillary Refill: Capillary refill takes 2 to 3 seconds.     Neurological:      General: No focal deficit present.      Mental Status: He is alert and oriented to person, place, and time.     Psychiatric:         Mood and Affect: Mood normal.         Behavior: Behavior normal.              Lab Results:   Results from last 7 days   Lab Units 06/20/25  0847   WBC Thousand/uL 8.88   HEMOGLOBIN g/dL 14.1   HEMATOCRIT % 45.8   PLATELETS Thousands/uL 244   POTASSIUM mmol/L 4.1   CHLORIDE mmol/L 105   CO2 mmol/L 29   BUN mg/dL 27*   CREATININE mg/dL 1.09   CALCIUM mg/dL 9.7     Other Studies:     Portions of the record may have been created with voice recognition software. Occasional wrong word or \"sound a like\" substitutions may have occurred due to the inherent limitations of voice recognition software. Read the chart carefully and recognize, using context, where substitutions have occurred.If you have any questions, please contact the dictating " provider.         [1]   Past Medical History:  Diagnosis Date    Acute kidney injury superimposed on chronic kidney disease  (HCC) 02/16/2022    Anemia     CAD (coronary artery disease)     Callus     Cancer (HCC)     prostate    CHF (congestive heart failure) (HCC)     Chronic kidney disease     Clotting disorder (HCC)     Coronary artery disease     Deep vein thrombosis (HCC)     Diabetes mellitus (HCC)     Diabetic ulcer of left foot associated with type 2 diabetes mellitus, with muscle involvement without evidence of necrosis (HCC) 08/08/2024    Diabetic ulcer of right midfoot associated with diabetes mellitus due to underlying condition, limited to breakdown of skin (HCC) 06/15/2023    Diabetic ulcer of toe of right foot associated with type 2 diabetes mellitus, with necrosis of bone (HCC) 03/28/2025    Difficulty walking     Duodenal ulcer     Ear problems     Erectile dysfunction     Heart disease 1988    Heart murmur     HL (hearing loss)     Hyperlipidemia     Hypertension     Myocardial infarction (HCC)     Neuropathy     Bilateral feet    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)     Plantar fasciitis     Prostate cancer (HCC) 2012    Sleep apnea     Could not tolerate CPAP    Urinary incontinence     Vascular disorder 2023   [2]   Past Surgical History:  Procedure Laterality Date    ABDOMINAL AORTIC ANEURYSM REPAIR      Stented    ADENOIDECTOMY      BACK SURGERY  1985    CARDIAC CATHETERIZATION Left 10/19/2022    Procedure: Cardiac Left Heart Cath;  Surgeon: Danielle Pereira MD;  Location: [facility];  Service: Cardiology    CARDIAC ELECTROPHYSIOLOGY PROCEDURE Left 12/16/2024    Procedure: Cardiac pacer implant;  Surgeon: Danielle Pereira MD;  Location: [facility];  Service: Cardiology    CARDIAC SURGERY  2002    3 cardiac bypass then angioplasty 7/2020    CHOLECYSTECTOMY      COLONOSCOPY      CORONARY ARTERY BYPASS GRAFT      IR LOWER EXTREMITY ANGIOGRAM  11/01/2023    IR LOWER EXTREMITY ANGIOGRAM   2024    IR LOWER EXTREMITY ANGIOGRAM  2025    LAMINECTOMY  1990    NV BYPASS W/VEIN FEMORAL-POPLITEAL Right 2023    Procedure: BYPASS FEMORAL-POPLITEAL WITH CRYO VEIN, RIGHT FEMORAL ENDARTERECTOMY;  Surgeon: Vasquez Clark MD;  Location: [facility];  Service: Vascular    NV BYPASS W/VEIN FEMORAL-POPLITEAL Left 2024    Procedure: left lower extremity above knee popliteal to below knee popliteal artery bypass with PTFE graft;  Surgeon: Vasquez Clark MD;  Location: [facility];  Service: Vascular    NV SLCTV CATHJ 3RD+ ORD SLCTV ABDL PEL/LXTR BRNCH Right 2023    Procedure: ARTERIOGRAM Right lower extremity arteriogram with CO2 via right groin access;  Surgeon: Vasquez Clark MD;  [facility]: [facility];  Service: Vascular    NV SLCTV CATHJ 3RD+ ORD SLCTV ABDL PEL/LXTR BRNCH Left 2024    Procedure: diagnostic LLE Arteriogram;  Surgeon: Vasquez Clark MD;  [facility];  Service: Vascular    PROSTATE SURGERY      PROSTATECTOMY      TONSILLECTOMY      UPPER GASTROINTESTINAL ENDOSCOPY  2024    URINARY SPHINCTER IMPLANT     [3]   Allergies  Allergen Reactions    Lisinopril Rash and Lip Swelling   [4]   Social History  Tobacco Use   Smoking Status Former    Current packs/day: 0.00    Average packs/day: 1.5 packs/day for 35.0 years (52.5 ttl pk-yrs)    Types: Cigarettes    Start date: 1956    Quit date:     Years since quittin.4    Passive exposure: Past   Smokeless Tobacco Never   [5]   Family History  Problem Relation Name Age of Onset    Diabetes Mother Alesha Horne     Varicose Veins Mother Alesha Horne     Alcohol abuse Father Latrice     Heart disease Brother Felix     Cancer Sister Parris Patino         Thyroid    Cancer Sister Margoth         Colon    Cancer Brother Eddany         Throat    Thyroid disease Brother Eddany     Cancer Brother Justino     Colon cancer Brother Justino     Diabetes Sister Idalmis      Thyroid disease Sister Parris     Colon cancer Sister Tessy     Colon cancer Sister Tessy     Colon cancer Sister Tessy     Mental illness Neg Hx     [6]   Current Facility-Administered Medications:     sodium chloride 0.9 % bolus 309 mL, 3 mL/kg, Intravenous, Once, Last Rate: 154.5 mL/hr at 06/20/25 0844, 309 mL at 06/20/25 0844 **FOLLOWED BY** sodium chloride 0.9 % infusion, 75 mL/hr, Intravenous, Continuous, APOLLO Ocampo

## 2025-06-20 NOTE — ASSESSMENT & PLAN NOTE
Etiology: Suspect secondary to diabetic kidney disease, hypertensive nephrosclerosis, cardiorenal syndrome  Baseline creatinine 1.6-1.8  Since last discharge patient has been off losartan and torsemide  Current creatinine today below baseline 1.09 likely due to being off losartan and torsemide  Last dose torsemide and Jardiance was Sunday  Plan:  Currently on IV fluids per cardiology protocol at 75 cc an hour  Recommend IV fluids at 75 cc an hour for 4 hours postprocedure then discontinue  risks of IVY with contrast exposure discussed with patient and wife at bedside and with creatinine below baseline patient okay to move forward with cardiac catheterization  Patient has a 26% risk of contrast nephropathy and a 1.1% risk of dialysis with contrast exposure in the setting of CKD stage IIIb  Continue to hold losartan and torsemide while admitted  Okay for torsemide every other day on discharge if patient with increased edema or shortness of breath  Recommend checking BMP 48 hours postprocedure on Monday  If remains hospitalized please check BMP in a.m.

## 2025-06-21 LAB
ATRIAL RATE: 70 BPM
QRS AXIS: -2 DEGREES
QRSD INTERVAL: 158 MS
QT INTERVAL: 494 MS
QTC INTERVAL: 534 MS
T WAVE AXIS: 197 DEGREES
VENTRICULAR RATE: 70 BPM

## 2025-06-21 PROCEDURE — 93010 ELECTROCARDIOGRAM REPORT: CPT | Performed by: INTERNAL MEDICINE

## 2025-06-23 ENCOUNTER — TELEPHONE (OUTPATIENT)
Dept: CARDIAC SURGERY | Facility: CLINIC | Age: 80
End: 2025-06-23

## 2025-06-23 ENCOUNTER — APPOINTMENT (OUTPATIENT)
Dept: LAB | Facility: CLINIC | Age: 80
End: 2025-06-23
Attending: INTERNAL MEDICINE
Payer: COMMERCIAL

## 2025-06-23 DIAGNOSIS — R91.1 PULMONARY NODULE 1 CM OR GREATER IN DIAMETER: Primary | ICD-10-CM

## 2025-06-23 DIAGNOSIS — N18.32 CKD STAGE 3B, GFR 30-44 ML/MIN (HCC): ICD-10-CM

## 2025-06-23 LAB
ANION GAP SERPL CALCULATED.3IONS-SCNC: 8 MMOL/L (ref 4–13)
BUN SERPL-MCNC: 31 MG/DL (ref 5–25)
CALCIUM SERPL-MCNC: 9.7 MG/DL (ref 8.4–10.2)
CHLORIDE SERPL-SCNC: 102 MMOL/L (ref 96–108)
CO2 SERPL-SCNC: 31 MMOL/L (ref 21–32)
CREAT SERPL-MCNC: 1.39 MG/DL (ref 0.6–1.3)
GFR SERPL CREATININE-BSD FRML MDRD: 46 ML/MIN/1.73SQ M
GLUCOSE P FAST SERPL-MCNC: 113 MG/DL (ref 65–99)
POTASSIUM SERPL-SCNC: 4.1 MMOL/L (ref 3.5–5.3)
SODIUM SERPL-SCNC: 141 MMOL/L (ref 135–147)

## 2025-06-23 PROCEDURE — 36415 COLL VENOUS BLD VENIPUNCTURE: CPT

## 2025-06-23 PROCEDURE — 80048 BASIC METABOLIC PNL TOTAL CA: CPT

## 2025-06-23 NOTE — TELEPHONE ENCOUNTER
Spoke with patient and reviewed the incidental finding noted on pre TAVR CTA. Informed him Dr. Rouse is referring him to Thoracic Surgery to address. All questions addressed. Patient states he needs dental treatments first appt 7/22/25. Will call us after completed.

## 2025-06-26 ENCOUNTER — APPOINTMENT (OUTPATIENT)
Dept: RADIOLOGY | Facility: CLINIC | Age: 80
End: 2025-06-26
Attending: STUDENT IN AN ORGANIZED HEALTH CARE EDUCATION/TRAINING PROGRAM
Payer: COMMERCIAL

## 2025-06-26 ENCOUNTER — OFFICE VISIT (OUTPATIENT)
Dept: PAIN MEDICINE | Facility: CLINIC | Age: 80
End: 2025-06-26
Payer: COMMERCIAL

## 2025-06-26 DIAGNOSIS — M46.1 SACROILIITIS (HCC): Primary | ICD-10-CM

## 2025-06-26 DIAGNOSIS — M46.1 SACROILIITIS (HCC): ICD-10-CM

## 2025-06-26 PROCEDURE — G2211 COMPLEX E/M VISIT ADD ON: HCPCS | Performed by: STUDENT IN AN ORGANIZED HEALTH CARE EDUCATION/TRAINING PROGRAM

## 2025-06-26 PROCEDURE — 99214 OFFICE O/P EST MOD 30 MIN: CPT | Performed by: STUDENT IN AN ORGANIZED HEALTH CARE EDUCATION/TRAINING PROGRAM

## 2025-06-26 PROCEDURE — 73502 X-RAY EXAM HIP UNI 2-3 VIEWS: CPT

## 2025-06-26 NOTE — PROGRESS NOTES
Name: Thomas Horne      : 1945      MRN: [id number]  Encounter Provider: Kirk Acuna MD  Encounter Date: 2025   Encounter department: [facility]  :  Assessment & Plan  Sacroiliitis (HCC)    Orders:  •  Ambulatory referral to Physical Therapy; Future  •  XR hip/pelv 2-3 vws right if performed; Future    Patient is a pleasant 80-year-old male who presents as a follow-up visit after last being seen in October for Low back pain.  Since that time patient states his pain varies from a 3 out of 10 to a 10 out of 10 on numeric rating scale.  He is complaining mainly of right hip and buttocks pain with no radiation into the groin or down the leg.  The pain is constant and gets worse with movement.  The quality pain is a dull aching, sharp, throbbing sensation.  The pain is interfere with his daily activities and has not been doing conservative treatment.  At last visit patient was provided with a short prescription of Percocet 5-3 25 twice daily.    On examination patient with tenderness to palpation in the right lumbar spine and over the right sacroiliac joint.  No tenderness to palpation in the right greater trochanter bursa.  Patient with mild discomfort with SHELBI, FADIR and thigh thrust.  In regards to symptom management patient states he rarely utilize Percocet.  He does use heat and ice as needed.  Discussed with patient that I do believe he would benefit from physical therapy.  Given this we will place a new prescription for this.  Additionally we will get an x-ray of the right hip just to evaluate for any hip pathology.  Lastly for symptomatic relief patient To continue with use of Percocet 5-3 25 daily as needed along with Tylenol 975 mg 3 times daily as needed.  Patient also counseled to utilize heat, ice and a TENS unit.  If symptoms persist can consider SI joint injection in the future.    Complete risks and benefits including bleeding, infection, tissue reaction, nerve  injury and allergic reaction were discussed. The approach was demonstrated using models and literature was provided. Verbal and written consent was obtained.    My impressions and treatment recommendations were discussed in detail with the patient who verbalized understanding and had no further questions.  Discharge instructions were provided. I personally saw and examined the patient and I agree with the above discussed plan of care.    History of Present Illness     Thomas Horne is a 80 y.o. male who presents for a follow up office visit in regards to No chief complaint on file..     Patient is a pleasant 80-year-old male who presents as a follow-up visit after last being seen in October for Low back pain.  Since that time patient states his pain varies from a 3 out of 10 to a 10 out of 10 on numeric rating scale.  He is complaining mainly of right hip and buttocks pain with no radiation into the groin or down the leg.  The pain is constant and gets worse with movement.  The quality pain is a dull aching, sharp, throbbing sensation.  The pain is interfere with his daily activities and has not been doing conservative treatment.  At last visit patient was provided with a short prescription of Percocet 5-3 25 twice daily.        Opioid contract date    Last UDS Date: No results in last 5 years                    last taken on    Review of Systems   Constitutional:  Negative for unexpected weight change.   HENT:  Negative for ear pain.    Eyes:  Negative for visual disturbance.   Respiratory:  Negative for shortness of breath and wheezing.    Gastrointestinal:  Negative for abdominal pain.   Musculoskeletal:  Positive for gait problem. Negative for back pain.        Right hip pain   Neurological:  Positive for weakness. Negative for numbness.   Psychiatric/Behavioral:  Negative for decreased concentration.        Medical History Reviewed by provider this encounter:  Tobacco  Allergies  Meds  Problems  Med Hx   Surg Hx  Fam Hx     .       Objective   There were no vitals taken for this visit.     Pain Score: 10-Worst pain ever  Physical Exam  Constitutional: normal, well developed, well nourished, alert, in no distress and non-toxic and no overt pain behavior.  Eyes: anicteric  HEENT: grossly intact  Neck: supple, symmetric, trachea midline and no masses   Pulmonary: even and unlabored  Cardiovascular: No edema or pitting edema present  Skin: Normal without rashes or lesions and well hydrated  Psychiatric: Mood and affect appropriate  Neurologic: Cranial Nerves II-XII grossly intact  Musculoskeletal: normal gait. Mild discomfort with SHELBI, Thigh thrust and TTP over right SI  joint.

## 2025-06-30 DIAGNOSIS — M77.41 METATARSALGIA OF BOTH FEET: ICD-10-CM

## 2025-06-30 DIAGNOSIS — E08.621 DIABETIC ULCER OF RIGHT MIDFOOT ASSOCIATED WITH DIABETES MELLITUS DUE TO UNDERLYING CONDITION, LIMITED TO BREAKDOWN OF SKIN (HCC): ICD-10-CM

## 2025-06-30 DIAGNOSIS — M54.16 RADICULOPATHY OF LUMBAR REGION: ICD-10-CM

## 2025-06-30 DIAGNOSIS — E11.621 DIABETIC ULCER OF LEFT HEEL ASSOCIATED WITH TYPE 2 DIABETES MELLITUS, LIMITED TO BREAKDOWN OF SKIN (HCC): ICD-10-CM

## 2025-06-30 DIAGNOSIS — M77.42 METATARSALGIA OF BOTH FEET: ICD-10-CM

## 2025-06-30 DIAGNOSIS — L97.521 DIABETIC ULCER OF TOE OF LEFT FOOT ASSOCIATED WITH TYPE 2 DIABETES MELLITUS, LIMITED TO BREAKDOWN OF SKIN (HCC): ICD-10-CM

## 2025-06-30 DIAGNOSIS — L97.421 DIABETIC ULCER OF LEFT HEEL ASSOCIATED WITH TYPE 2 DIABETES MELLITUS, LIMITED TO BREAKDOWN OF SKIN (HCC): ICD-10-CM

## 2025-06-30 DIAGNOSIS — L97.411 DIABETIC ULCER OF RIGHT MIDFOOT ASSOCIATED WITH DIABETES MELLITUS DUE TO UNDERLYING CONDITION, LIMITED TO BREAKDOWN OF SKIN (HCC): ICD-10-CM

## 2025-06-30 DIAGNOSIS — E11.42 DIABETIC POLYNEUROPATHY ASSOCIATED WITH TYPE 2 DIABETES MELLITUS (HCC): ICD-10-CM

## 2025-06-30 DIAGNOSIS — E11.621 DIABETIC ULCER OF TOE OF LEFT FOOT ASSOCIATED WITH TYPE 2 DIABETES MELLITUS, LIMITED TO BREAKDOWN OF SKIN (HCC): ICD-10-CM

## 2025-06-30 DIAGNOSIS — I70.209 PERIPHERAL ARTERIOSCLEROSIS (HCC): ICD-10-CM

## 2025-07-01 RX ORDER — PREGABALIN 150 MG/1
150 CAPSULE ORAL 3 TIMES DAILY
Qty: 270 CAPSULE | Refills: 0 | Status: SHIPPED | OUTPATIENT
Start: 2025-07-01 | End: 2025-09-29

## 2025-07-01 RX ORDER — DULOXETIN HYDROCHLORIDE 30 MG/1
30 CAPSULE, DELAYED RELEASE ORAL DAILY
Qty: 90 CAPSULE | Refills: 1 | Status: SHIPPED | OUTPATIENT
Start: 2025-07-01

## 2025-07-01 RX ORDER — PENTOXIFYLLINE 400 MG/1
400 TABLET, EXTENDED RELEASE ORAL
Qty: 270 TABLET | Refills: 1 | Status: SHIPPED | OUTPATIENT
Start: 2025-07-01

## 2025-07-03 ENCOUNTER — EVALUATION (OUTPATIENT)
Dept: PHYSICAL THERAPY | Facility: CLINIC | Age: 80
End: 2025-07-03
Attending: STUDENT IN AN ORGANIZED HEALTH CARE EDUCATION/TRAINING PROGRAM
Payer: COMMERCIAL

## 2025-07-03 DIAGNOSIS — M46.1 SACROILIITIS (HCC): Primary | ICD-10-CM

## 2025-07-03 PROCEDURE — 97110 THERAPEUTIC EXERCISES: CPT | Performed by: PHYSICAL THERAPIST

## 2025-07-03 PROCEDURE — 97161 PT EVAL LOW COMPLEX 20 MIN: CPT | Performed by: PHYSICAL THERAPIST

## 2025-07-03 NOTE — PROGRESS NOTES
PT Evaluation     Today's date: 7/3/2025  Patient name: Thomas Horne  : 1945  MRN: [id number]  Referring provider: Kirk Acuna MD  Dx:   Encounter Diagnosis     ICD-10-CM    1. Sacroiliitis (HCC)  M46.1                      Assessment  Impairments: abnormal or restricted ROM, activity intolerance, lacks appropriate home exercise program, pain with function, poor posture  and poor body mechanics    Assessment details: 7/3/2025: Pt presents with constant but variable low back, right buttock/lateral hip and intermittent right anterior thigh pain. His  signs and symptoms present for approximately 2 months, are above to the knee which are not increasing or decreasing and are consistent with dysfunction/; pt demonstrates moderate to severe spine ROM deficits and LE flexbility deficits whic affect his standing posture and functional mobility. Repeated movement testing deferred  to next visit (pt a few minutes late & flexibility deemed the higher priority today). Pt will benefit from stretching and mobilization. We will explore the need for directional specific mechanical correction/. Pt will benefit from skilled physical therapy 2X/week for 6-12 weeks to address deficits in range of motion, strength and function and return to PLOF by reaching short and long term goals.    Understanding of Dx/Px/POC: good     Prognosis: good    Goals  Short Tem Goals to be met in 4 weeks (target date 2025)  1. Pt to be independent w/ HEP  2. Reduce c/o pain to 0-3/10 with activity and prolonged positions; improving pain from current constant to intermittent.  3. Improve prone quad ROM to >/= 90 degrees and hip exten PROM in SL to 0 deg B/L to allow pt to stand upright.  4. Complete repeated movement testing by visit 3.    Long Term Goal to be met in 12 weeks (target date 2025)  1. Pt to achieve full symptom prevention/resolution via HEP, achieving pain of 0-3/10.  2. Pt to resume PLOF regarding ADL's and IADL's.  Achieving FOTO score of 62 or better, allowing pt to stand/walk >/= 20 minutes w/o limitation due to back/hip pain.  3. Improve B/L hamstring ROM to >/= 70 deg to allow improved forward  bending.  4. Improve R hip ER ROM in prone to >/=45 deg to allow improved ease w/ LE dressing.  5. Improve  lumbar extension ROM to 75% lan or better and rotation R/L to 40% lan or better to allow improved functional mobility.  6. Pt to be able to complete 5xSTS w/o UE asisst in 18 sec or less.      Plan  Planned modality interventions: traction, unattended electrical stimulation and thermotherapy: hydrocollator packs    Planned therapy interventions: joint mobilization, manual therapy, abdominal trunk stabilization, patient education, postural training, stretching, transfer training, home exercise program and strengthening    Frequency: 2x week  Plan of Care beginning date: 7/3/2025  Plan of Care expiration date: 2025  Treatment plan discussed with: patient      Subjective Evaluation    History of Present Illness  Mechanism of injury: Subjective 7/3/2025: Pt reports an onset of low back, right buttock/lateral hip and anterior thigh pain approximately 2 months ago without incident. His pain is constant but variable in low back/hip/buttocks and intermittent in anterior thigh. Pain is worse w/ standing/walking,  limiting his tolerance to WB activity to 10 minutes. He admits to being more sedentary in th past few months due to SOB and vascular surgery a few months ago.   Patient Goals  Patient goals for therapy: increased strength, independence with ADLs/IADLs, return to sport/leisure activities, increased motion, improved balance and decreased pain    Pain  At best pain ratin  At worst pain ratin  Quality: dull ache, tight and sharp  Aggravating factors: standing, walking and stair climbing  Progression: worsening    Social Support  Steps to enter house: yes  Stairs in house: yes   Lives with: alone      Diagnostic  Tests  X-ray: abnormal (lumbar DDD; hip WNL)  Treatments  Current treatment: physical therapy    Objective    POSTURE:  7/3/2025 standing posture demonstratesabsent lumbar lordosis; there is mild left lateral shift; pt flexed at hips and knees   Posture correction in stting does not alter sympotoms    SPECIAL TESTS     7/3/2025  SLR supine:   (- R/- L)  Crossed SLR:  (- R/- L)  Prone instability test:  (-) henri lan  Prone hip IR ROM:  (+R/+L)  Aberrant motion/Candace's: (+)  Supine to sit test:  (-)  Sparks test prone:  (NT)  Slump test:   (-R/-L)  Femoral NTT:   (-R/-L) henri lan      DERMATOMAL TESTING: pt has chronic, stable, pre-existing neuropathy     7/3/2025  L2 anterior thigh:  WNL B/L  L3 medial knee:  WNL B/L  L4 medial maleolus:  DEC B/L  L5 1st web space:  DEC B/L  S1 lateral/sole foot:  DEC B/L  S2 poster calf:  DEC B/L    MYOTOMAL TESTIN/3/2025 7/3/2025     Right  Left  L2-3 Hip flexion:  5/5  5/5  L3-4 Knee extension:  5/5  5/5  L5 Great toe exten:  5/5  5/5  L4 Heel walk/DF:  5/5  5/5  S1 toe walk/PF/ever:  5/5  5/5  S2 knee flex:   5/5  5/5  Hip ER   4/5  4/5  Hip IR   4/5  4/5  Hip abd  4+/5  4+/5  Hip exten  4-/5  4-/*5    REFLEXES: 0= none; 1+ = slight; 2+ = brisk/normal; 3+= very brisk; 4+= clonus     7/3/2025  L3-4 Quadriceps:  0 B/L  L5-S1 Achilles:  0 B/L    SPINAL/PIAVM ASSESSMENT/PALPATION:   7/3/2025: Lumbar P to A segmental mobilitymaj limited w/ poor iban to prone lying due to lumbar ROM restrictions and hip flexor tightness B/L   (+) TTP B/L QL   (+) TTP B/L SI joint   (+) TTP B/L glut medius    LUMBAR AROM: 7/3/2025  Flexion   min lan  Extension  henri lan  R sideglide  Mod/henri lan  L sideglide  Mod/henri lan*  R rotat in L SL  Mod /henri lan  L rota in R LS  Mod/henri lan    MECHANICAL ASSESSMENT:   7/3/2025: pre-test findings include1-2/10 pain  Prone lying 5' = NE  Unable to get into WILLIAM due to ROM restriction  Repeated flexion in sitting 2x10 = not tested    FUNCTION:  7/3/2025: Pt is  limited with standing/walking tolerance 10 min or less   Pt cannot lie prone   Pt cannot stand fully upright   5xSTS 28 sec, needs B/L UE assist   TUG 14 sec no AD   Difficulty w/ LE dressing due to limited ROM       FLEXIBILITY:  7/3/2025: Hamstring flexibility mod/henri lan B/L: R 55 deg; L 60 deg         Quad flexibitlity henri lan B/L;  prone flexion 70 deg R/L   Hip flexor ROM mod/henri lan B/L - modified homero test supine w/ leg off edge w/ contra leg bent- knee flexion R 55 deg/ L 60 deg        Pirif flexibility henri lan R; mod lan L  Prone hip ER R 25/L 50  Prone hip IR R 30; L 20   Hip exten in SL PROM -10 B/L       Precautions:   Past Medical History:   Diagnosis Date   • Acute kidney injury superimposed on chronic kidney disease  ([facility]) 02/16/2022   • Anemia    • CAD (coronary artery disease)    • Callus    • Cancer ([facility])     prostate   • CHF (congestive heart failure) ([facility])    • Chronic kidney disease    • Clotting disorder ([facility])    • Coronary artery disease    • Deep vein thrombosis ([facility])    • Diabetes mellitus ([facility])    • Diabetic ulcer of left foot associated with type 2 diabetes mellitus, with muscle involvement without evidence of necrosis ([facility]) 08/08/2024   • Diabetic ulcer of right midfoot associated with diabetes mellitus due to underlying condition, limited to breakdown of skin ([facility]) 06/15/2023   • Diabetic ulcer of toe of right foot associated with type 2 diabetes mellitus, with necrosis of bone ([facility]) 03/28/2025   • Difficulty walking    • Duodenal ulcer    • Ear problems    • Erectile dysfunction    • Heart disease 1988   • Heart murmur    • HL (hearing loss)    • Hyperlipidemia    • Hypertension    • Myocardial infarction ([facility])    • Neuropathy     Bilateral feet   • Neuropathy in diabetes ([facility])    • Plantar fasciitis    • Prostate cancer ([facility]) 2012   • Sleep apnea     Could not tolerate CPAP   • Urinary incontinence    • Vascular disorder 2023     Past Surgical History:   Procedure Laterality Date    • ABDOMINAL AORTIC ANEURYSM REPAIR      Stented   • ADENOIDECTOMY     • BACK SURGERY  1985   • CARDIAC CATHETERIZATION Left 10/19/2022    Procedure: Cardiac Left Heart Cath;  Surgeon: Danielle Pereira MD;  [facility];  Service: Cardiology   • CARDIAC CATHETERIZATION  6/20/2025    Procedure: Cardiac Catheterization;  Surgeon: Itzel Parks DO;  [facility];  Service: Cardiology   • CARDIAC CATHETERIZATION N/A 6/20/2025    Procedure: Cardiac Coronary Angiogram;  Surgeon: Itzel Parks DO;  [facility];  Service: Cardiology   • CARDIAC ELECTROPHYSIOLOGY PROCEDURE Left 12/16/2024    Procedure: Cardiac pacer implant;  Surgeon: Danielle Pereira MD;  [facility]: [facility];  Service: Cardiology   • CARDIAC SURGERY  2002    3 cardiac bypass then angioplasty 7/2020   • CHOLECYSTECTOMY     • COLONOSCOPY     • CORONARY ARTERY BYPASS GRAFT     • IR LOWER EXTREMITY ANGIOGRAM  11/01/2023   • IR LOWER EXTREMITY ANGIOGRAM  08/07/2024   • IR LOWER EXTREMITY ANGIOGRAM  01/07/2025   • LAMINECTOMY  1990   • OR BYPASS W/VEIN FEMORAL-POPLITEAL Right 11/16/2023    Procedure: BYPASS FEMORAL-POPLITEAL WITH CRYO VEIN, RIGHT FEMORAL ENDARTERECTOMY;  Surgeon: Vasquez Clark MD;  [facility];  Service: Vascular   • OR BYPASS W/VEIN FEMORAL-POPLITEAL Left 08/30/2024    Procedure: left lower extremity above knee popliteal to below knee popliteal artery bypass with PTFE graft;  Surgeon: Vasquez Clark MD;  [facility];  Service: Vascular   • OR SLCTV CATHJ 3RD+ ORD SLCTV ABDL PEL/LXTR BRNCH Right 11/01/2023    Procedure: ARTERIOGRAM Right lower extremity arteriogram with CO2 via right groin access;  Surgeon: Vasquez Clark MD;  [facility];  Service: Vascular   • OR SLCTV CATHJ 3RD+ ORD SLCTV ABDL PEL/LXTR BRNCH Left 08/07/2024    Procedure: diagnostic LLE Arteriogram;  Surgeon: Vasquez Clark MD;  " [facility];  Service: Vascular   • PROSTATE SURGERY     • PROSTATECTOMY     • TONSILLECTOMY     • UPPER GASTROINTESTINAL ENDOSCOPY  2/14/2024   • URINARY SPHINCTER IMPLANT     SOC: 7/3/2025  FOTO: 7/3/2025    POC EXP: 9/25/20025  DAILY TREATMENT LOG:  DATE 7/3/2025       VISIT #/AUTH        MANUAL                        Neuro Re-Ed                                                                Ther Ex 15'               Sup hip flexor stretch off edge 1'x1 R/L       Sup fig 4 stretch 30\"x2 R/L       Sup ham stretch w/ SOS 30\"x2 R/L               Lumb rotat in SL 30\"x2 R/L       HIWOT vs counter  trial      Hip flexor stretch foot on step        Stand alt hip abd w/ TB        Stand alt hip exten w/ TB        HEP Issued & reviewed       Ther Activity        bridges 1x5       Long stepping at rail        STS        Gait Training                        Modalities                        HEP  Access Code: [email or web address]  URL: [email or web address]  Date: 07/03/2025  Prepared by: Salima Ford    Exercises  - Supine Hamstring Stretch with Strap  - 1-2 x daily - 7 x weekly - 3 reps - 30 sec hold  - Supine Hip External Rotation Stretch  - 1-2 x daily - 3 reps - 30 sec hold  - Supine Bridge  - 1-2 x daily - 2 sets - 5-10 reps  - Modified Alexandre Stretch  - 1-2 x daily - 2 reps - 1 min hold         "

## 2025-07-03 NOTE — HOME EXERCISE EDUCATION
Program_ID:[id number]   Access Code: [email or web address]  URL: [email or web address]  Date: 07-  Prepared By: Salima Ford    Program Notes      Exercises      - Supine Hamstring Stretch with Strap - 1-2 x daily - 7 x weekly -  sets - 3 reps - 30 sec hold      - Supine Hip External Rotation Stretch - 1-2 x daily -  x weekly -  sets - 3 reps - 30 sec hold      - Supine Bridge - 1-2 x daily -  x weekly - 2 sets - 5-10 reps      - Modified Alexandre Stretch - 1-2 x daily -  x weekly -  sets - 2 reps - 1 min hold

## 2025-07-07 ENCOUNTER — OFFICE VISIT (OUTPATIENT)
Dept: PHYSICAL THERAPY | Facility: CLINIC | Age: 80
End: 2025-07-07
Attending: STUDENT IN AN ORGANIZED HEALTH CARE EDUCATION/TRAINING PROGRAM
Payer: COMMERCIAL

## 2025-07-07 DIAGNOSIS — M46.1 SACROILIITIS (HCC): ICD-10-CM

## 2025-07-07 PROCEDURE — 97110 THERAPEUTIC EXERCISES: CPT | Performed by: PHYSICAL THERAPIST

## 2025-07-07 PROCEDURE — 97530 THERAPEUTIC ACTIVITIES: CPT | Performed by: PHYSICAL THERAPIST

## 2025-07-07 NOTE — PROGRESS NOTES
Daily Note     Today's date: 2025  Patient name: Thomas Horne  : 1945  MRN: [id number]  Referring provider: Kirk Acuna MD  Dx:   Encounter Diagnosis     ICD-10-CM    1. Sacroiliitis (HCC)  M46.1 Ambulatory referral to Physical Therapy                     Subjective: Pt states he is tolerating his HEP well and has only stiffness in his low back upon arrival today.       Objective: See treatment diary below; progressed his stretching today, updated HEP. Asked pt about abrasion on Left lower leg, he states he scraped it on concrete yesterday. I replaced the band aid since he states he forgot to put a new one on today. Advised pt his lower leg appears a little red. This decreased in supine vs standing. Advised him to call his PCP if the redness worsens or persists due to hx of poor LE circulation. Took picture for pt w/ his phone.       Assessment: Tolerated treatment well. Patient would benefit from continued PT and ROM/stretching to improve flexed posture.      Plan: Progress treatment as tolerated.       Precautions:   Past Medical History:   Diagnosis Date    Acute kidney injury superimposed on chronic kidney disease  (HCC) 2022    Anemia     CAD (coronary artery disease)     Callus     Cancer (HCC)     prostate    CHF (congestive heart failure) (HCC)     Chronic kidney disease     Clotting disorder (HCC)     Coronary artery disease     Deep vein thrombosis (HCC)     Diabetes mellitus (HCC)     Diabetic ulcer of left foot associated with type 2 diabetes mellitus, with muscle involvement without evidence of necrosis (HCC) 2024    Diabetic ulcer of right midfoot associated with diabetes mellitus due to underlying condition, limited to breakdown of skin (HCC) 06/15/2023    Diabetic ulcer of toe of right foot associated with type 2 diabetes mellitus, with necrosis of bone (HCC) 2025    Difficulty walking     Duodenal ulcer     Ear problems     Erectile dysfunction     Heart disease  1988    Heart murmur     HL (hearing loss)     Hyperlipidemia     Hypertension     Myocardial infarction (HCC)     Neuropathy     Bilateral feet    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)     Plantar fasciitis     Prostate cancer (HCC) 2012    Sleep apnea     Could not tolerate CPAP    Urinary incontinence     Vascular disorder 2023     Past Surgical History:   Procedure Laterality Date    ABDOMINAL AORTIC ANEURYSM REPAIR      Stented    ADENOIDECTOMY      BACK SURGERY  1985    CARDIAC CATHETERIZATION Left 10/19/2022    Procedure: Cardiac Left Heart Cath;  Surgeon: Danielle Pereira MD;  [facility];  Service: Cardiology    CARDIAC CATHETERIZATION  6/20/2025    Procedure: Cardiac Catheterization;  Surgeon: Itzel Parks DO;  [facility];  Service: Cardiology    CARDIAC CATHETERIZATION N/A 6/20/2025    Procedure: Cardiac Coronary Angiogram;  Surgeon: Itzel Parks DO;  [facility];  Service: Cardiology    CARDIAC ELECTROPHYSIOLOGY PROCEDURE Left 12/16/2024    Procedure: Cardiac pacer implant;  Surgeon: Danielle Pereira MD;  [facility];  Service: Cardiology    CARDIAC SURGERY  2002    3 cardiac bypass then angioplasty 7/2020    CHOLECYSTECTOMY      COLONOSCOPY      CORONARY ARTERY BYPASS GRAFT      IR LOWER EXTREMITY ANGIOGRAM  11/01/2023    IR LOWER EXTREMITY ANGIOGRAM  08/07/2024    IR LOWER EXTREMITY ANGIOGRAM  01/07/2025    LAMINECTOMY  1990    MI BYPASS W/VEIN FEMORAL-POPLITEAL Right 11/16/2023    Procedure: BYPASS FEMORAL-POPLITEAL WITH CRYO VEIN, RIGHT FEMORAL ENDARTERECTOMY;  Surgeon: Vasquez Clark MD;  [facility];  Service: Vascular    MI BYPASS W/VEIN FEMORAL-POPLITEAL Left 08/30/2024    Procedure: left lower extremity above knee popliteal to below knee popliteal artery bypass with PTFE graft;  Surgeon: Vasquez Clark MD;  [facility];  Service: Vascular    MI SLCTV CATHJ 3RD+ ORD SLCTV ABDL  "PEL/LXTR BRNCH Right 11/01/2023    Procedure: ARTERIOGRAM Right lower extremity arteriogram with CO2 via right groin access;  Surgeon: Vasquez Clark MD;  [facility];  Service: Vascular    MD SLCTV CATHJ 3RD+ ORD SLCTV ABDL PEL/LXTR BRNCH Left 08/07/2024    Procedure: diagnostic LLE Arteriogram;  Surgeon: Vasquez Clark MD;  [facility];  Service: Vascular    PROSTATE SURGERY      PROSTATECTOMY      TONSILLECTOMY      UPPER GASTROINTESTINAL ENDOSCOPY  2/14/2024    URINARY SPHINCTER IMPLANT     SOC: 7/3/2025  FOTO: 7/3/2025    POC EXP: 9/25/20025  DAILY TREATMENT LOG:  DATE 7/3/2025 7/7/2025      VISIT #/AUTH  2      MANUAL                        Neuro Re-Ed                                                                Ther Ex 15' 35'      Recumb bike  L1x5'      Sup hip flexor stretch off edge 1'x1 R/L 1'x1 R/L      Sup fig 4 stretch 30\"x2 R/L 30\"x3 R/L      Sup ham stretch w/ SOS 30\"x2 R/L 30\"x3 R/L              Lumb rotat in SL 30\"x2 R/L       HIWOT vs counter  Against counter 5\"x10; 2x      Hip flexor stretch foot on step  8\" step 10\"x5 R/L      Stand alt hip abd w/ TB        Stand alt hip exten w/ TB  No TB 1x10 R/L       HEP Issued & reviewed       Ther Activity  8'      bridges 1x5 1x10; 2x      Long stepping at rail  3 laps      STS        Gait Training                        Modalities                        HEP  Access Code: [email or web address]  URL: [email or web address]  Date: 07/07/2025  Prepared by: Salima Ford    Exercises  - Supine Hamstring Stretch with Strap  - 1-2 x daily - 7 x weekly - 3 reps - 30 sec hold  - Supine Hip External Rotation Stretch  - 1-2 x daily - 3 reps - 30 sec hold  - Supine Bridge  - 1-2 x daily - 2 sets - 5-10 reps  - Modified Alexandre Stretch  - 1-2 x daily - 2 reps - 1 min hold  - Standing Lumbar Extension with Counter  - 1-2 x daily - 2 sets - 10 reps - 5 sec hold           "

## 2025-07-08 ENCOUNTER — APPOINTMENT (OUTPATIENT)
Dept: PHYSICAL THERAPY | Facility: CLINIC | Age: 80
End: 2025-07-08
Attending: STUDENT IN AN ORGANIZED HEALTH CARE EDUCATION/TRAINING PROGRAM
Payer: COMMERCIAL

## 2025-07-09 ENCOUNTER — OFFICE VISIT (OUTPATIENT)
Dept: PHYSICAL THERAPY | Facility: CLINIC | Age: 80
End: 2025-07-09
Attending: STUDENT IN AN ORGANIZED HEALTH CARE EDUCATION/TRAINING PROGRAM
Payer: COMMERCIAL

## 2025-07-09 DIAGNOSIS — M46.1 SACROILIITIS (HCC): Primary | ICD-10-CM

## 2025-07-09 PROCEDURE — 97112 NEUROMUSCULAR REEDUCATION: CPT

## 2025-07-09 PROCEDURE — 97110 THERAPEUTIC EXERCISES: CPT

## 2025-07-14 DIAGNOSIS — E11.621 DIABETIC ULCER OF TOE OF LEFT FOOT ASSOCIATED WITH TYPE 2 DIABETES MELLITUS, LIMITED TO BREAKDOWN OF SKIN (HCC): ICD-10-CM

## 2025-07-14 DIAGNOSIS — I73.9 PERIPHERAL ARTERY DISEASE (HCC): ICD-10-CM

## 2025-07-14 DIAGNOSIS — L97.521 DIABETIC ULCER OF TOE OF LEFT FOOT ASSOCIATED WITH TYPE 2 DIABETES MELLITUS, LIMITED TO BREAKDOWN OF SKIN (HCC): ICD-10-CM

## 2025-07-15 ENCOUNTER — OFFICE VISIT (OUTPATIENT)
Dept: PHYSICAL THERAPY | Facility: CLINIC | Age: 80
End: 2025-07-15
Attending: STUDENT IN AN ORGANIZED HEALTH CARE EDUCATION/TRAINING PROGRAM
Payer: COMMERCIAL

## 2025-07-15 DIAGNOSIS — M46.1 SACROILIITIS (HCC): Primary | ICD-10-CM

## 2025-07-15 PROCEDURE — 97110 THERAPEUTIC EXERCISES: CPT

## 2025-07-15 PROCEDURE — 97112 NEUROMUSCULAR REEDUCATION: CPT

## 2025-07-15 RX ORDER — CLOPIDOGREL BISULFATE 75 MG/1
75 TABLET ORAL DAILY
Qty: 90 TABLET | Refills: 1 | Status: SHIPPED | OUTPATIENT
Start: 2025-07-15

## 2025-07-17 ENCOUNTER — OFFICE VISIT (OUTPATIENT)
Dept: PHYSICAL THERAPY | Facility: CLINIC | Age: 80
End: 2025-07-17
Attending: STUDENT IN AN ORGANIZED HEALTH CARE EDUCATION/TRAINING PROGRAM
Payer: COMMERCIAL

## 2025-07-17 DIAGNOSIS — M46.1 SACROILIITIS (HCC): Primary | ICD-10-CM

## 2025-07-17 PROCEDURE — 97110 THERAPEUTIC EXERCISES: CPT | Performed by: PHYSICAL THERAPIST

## 2025-07-17 PROCEDURE — 97112 NEUROMUSCULAR REEDUCATION: CPT | Performed by: PHYSICAL THERAPIST

## 2025-07-17 NOTE — HOME EXERCISE EDUCATION
Program_ID:[id number]   Access Code: [email or web address]  URL: [email or web address]  Date: 07-  Prepared By: Salima Ford    Program Notes      Exercises      - Supine Hamstring Stretch with Strap - 1-2 x daily - 7 x weekly -  sets - 3 reps - 30 sec hold      - Supine Hip External Rotation Stretch - 1-2 x daily -  x weekly -  sets - 3 reps - 30 sec hold      - Standing Lumbar Extension with Counter - 1-2 x daily -  x weekly - 2 sets - 10 reps - 5 sec hold      - Hip Flexor Stretch on Step - 2 x daily - 7 x weekly -  sets - 5 reps - 10 sec hold      - Shoulder extension with resistance - Neutral - 1 x daily - 7 x weekly - 2 sets - 10 reps      - Standing Hip Extension Kicks - 1 x daily - 7 x weekly - 2 sets - 10 reps      - Standing Hip Abduction with Resistance at Ankles and Counter Support - 1 x daily -  x weekly - 2 sets - 10 reps      - Standing Terminal Knee Extension at Wall with Ball - 1 x daily - 7 x weekly - 2 sets - 10 reps - 5 SEC hold

## 2025-07-17 NOTE — PROGRESS NOTES
Daily Note     Today's date: 2025  Patient name: Thomas Horne  : 1945  MRN: [id number]  Referring provider: Kirk Acuna MD  Dx:   Encounter Diagnosis     ICD-10-CM    1. Sacroiliitis (HCC)  M46.1                      Subjective: Pt states he's not really seeing any improvement in his back pain/standing tolerance. He has not tried to utilize his HEP when back pain occurs w/ standing. Pt describes pain w/ prolonged walking or ie: brushing teeth.      Objective: See treatment diary below; educ pt on flexed posture standing strains his back more, try to stand upright while brushing teeth, bending over only for times when he needs to use sink. Also suggested trying hip flexor stretch on step when back or LE pain occurs w/ standing/walking.  Updated HEP to include more hip/posterior trunk strengthening and emphasize more standing activity to build this tolerance.      Assessment: Tolerated treatment well and stood for entire session except for bike w/ no significant pain. Patient would benefit from continued PT      Plan: Progress treatment as tolerated.       Precautions:   Past Medical History:   Diagnosis Date    Acute kidney injury superimposed on chronic kidney disease  (HCC) 2022    Anemia     CAD (coronary artery disease)     Callus     Cancer (HCC)     prostate    CHF (congestive heart failure) (HCC)     Chronic kidney disease     Clotting disorder (HCC)     Coronary artery disease     Deep vein thrombosis (HCC)     Diabetes mellitus (HCC)     Diabetic ulcer of left foot associated with type 2 diabetes mellitus, with muscle involvement without evidence of necrosis (HCC) 2024    Diabetic ulcer of right midfoot associated with diabetes mellitus due to underlying condition, limited to breakdown of skin (HCC) 06/15/2023    Diabetic ulcer of toe of right foot associated with type 2 diabetes mellitus, with necrosis of bone (HCC) 2025    Difficulty walking     Duodenal ulcer     Ear  problems     Erectile dysfunction     Heart disease 1988    Heart murmur     HL (hearing loss)     Hyperlipidemia     Hypertension     Myocardial infarction (HCC)     Neuropathy     Bilateral feet    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)     Plantar fasciitis     Prostate cancer (HCC) 2012    Sleep apnea     Could not tolerate CPAP    Urinary incontinence     Vascular disorder 2023     Past Surgical History:   Procedure Laterality Date    ABDOMINAL AORTIC ANEURYSM REPAIR      Stented    ADENOIDECTOMY      BACK SURGERY  1985    CARDIAC CATHETERIZATION Left 10/19/2022    Procedure: Cardiac Left Heart Cath;  Surgeon: Danielle Pereira MD;  [facility];  Service: Cardiology    CARDIAC CATHETERIZATION  6/20/2025    Procedure: Cardiac Catheterization;  Surgeon: Itzel Parks DO;  [facility];  Service: Cardiology    CARDIAC CATHETERIZATION N/A 6/20/2025    Procedure: Cardiac Coronary Angiogram;  Surgeon: Itzel Parks DO;  [facility];  Service: Cardiology    CARDIAC ELECTROPHYSIOLOGY PROCEDURE Left 12/16/2024    Procedure: Cardiac pacer implant;  Surgeon: Danielle Pereira MD;  [facility];  Service: Cardiology    CARDIAC SURGERY  2002    3 cardiac bypass then angioplasty 7/2020    CHOLECYSTECTOMY      COLONOSCOPY      CORONARY ARTERY BYPASS GRAFT      IR LOWER EXTREMITY ANGIOGRAM  11/01/2023    IR LOWER EXTREMITY ANGIOGRAM  08/07/2024    IR LOWER EXTREMITY ANGIOGRAM  01/07/2025    LAMINECTOMY  1990    CO BYPASS W/VEIN FEMORAL-POPLITEAL Right 11/16/2023    Procedure: BYPASS FEMORAL-POPLITEAL WITH CRYO VEIN, RIGHT FEMORAL ENDARTERECTOMY;  Surgeon: Vasquez Clark MD;  [facility];  Service: Vascular    CO BYPASS W/VEIN FEMORAL-POPLITEAL Left 08/30/2024    Procedure: left lower extremity above knee popliteal to below knee popliteal artery bypass with PTFE graft;  Surgeon: Vasquez Clark MD;  [facility];   "Service: Vascular    OH SLCTV CATHJ 3RD+ ORD SLCTV ABDL PEL/LXTR BRNCH Right 11/01/2023    Procedure: ARTERIOGRAM Right lower extremity arteriogram with CO2 via right groin access;  Surgeon: Vasquez Clark MD;  [facility];  Service: Vascular    OH SLCTV CATHJ 3RD+ ORD SLCTV ABDL PEL/LXTR BRNCH Left 08/07/2024    Procedure: diagnostic LLE Arteriogram;  Surgeon: Vasquez Clark MD;  [facility];  Service: Vascular    PROSTATE SURGERY      PROSTATECTOMY      TONSILLECTOMY      UPPER GASTROINTESTINAL ENDOSCOPY  2/14/2024    URINARY SPHINCTER IMPLANT     SOC: 7/3/2025  FOTO: 7/18/2025    POC EXP: 9/25/20025  DAILY TREATMENT LOG:  DATE 7/3/2025 7/7/2025 7/9/2025 7/15/2025 7/18/2025  FOTO   VISIT #/AUTH  2 3  4  5   MANUAL                        Neuro Re-Ed    10'  20'   Paloff press     [place] 9# 1x10 R/L  [place] 9# 2x10 ea way   B/L shoulder ext w/ core bracing     [place] 9# 1x10  [place] 10# 2x10 VC for upright posture   Std pball bracing     5\"x10     TKE w/ ball vs wall w/ upright posture     5\"x10 R/L; 2x                           Ther Ex 15' 35' 30'  30'  25'   Recumb bike  L1x5' L2x5'  L2x5'  L2x5'   Sup hip flexor stretch off edge 1'x1 R/L 1'x1 R/L 1' R/L  1' R/L     Sup fig 4 stretch 30\"x2 R/L 30\"x3 R/L 30\"x3 R/L      Sup ham stretch w/ SOS 30\"x2 R/L 30\"x3 R/L 30\"x3 R/L      Supine LTR     5\"x10 R/L     Lumb rotat in SL 30\"x2 R/L       HIWOT vs counter  Against counter 5\"x10; 2x At counter 5\"x10; 2x  5\"x10  5\"x10 vs counter   Hip flexor stretch foot on step  8\" step 10\"x5 R/L 8\" step 10\"x5 R/L  On step 10\"x5 R/L  On step 10\"x5 R/L; 2x   SL leg press    40# 2x10 R/L  40# 2x10 R/L     Stand alt hip abd w/ TB   1x10 R/L  GTB @ knees 1x10 R/L  Grn TB @ knees 1x10 R/L   Stand alt hip exten w/ TB  No TB 1x10 R/L  No TB 1x10 R/L  GTB @ knees 1x10 R/L  Grn TB @ knees 1x10 R/L   HEP Issued & reviewed    Update & review   Ther Activity  8' 10'      bridges 1x5 1x10; 2x 1x10; 2x  " "Painful today     Long stepping at rail  3 laps      STS   Elevated mat 24\" 1x10      Gait Training                        Modalities                        HEP  Access Code: [email or web address]  URL: [email or web address]  Date: 07/17/2025  Prepared by: Salima Ford    Exercises  - Supine Hamstring Stretch with Strap  - 1-2 x daily - 7 x weekly - 3 reps - 30 sec hold  - Supine Hip External Rotation Stretch  - 1-2 x daily - 3 reps - 30 sec hold  - Standing Lumbar Extension with Counter  - 1-2 x daily - 2 sets - 10 reps - 5 sec hold  - Hip Flexor Stretch on Step  - 2 x daily - 7 x weekly - 5 reps - 10 sec hold  - Shoulder extension with resistance - Neutral  - 1 x daily - 7 x weekly - 2 sets - 10 reps  - Standing Hip Extension Kicks  - 1 x daily - 7 x weekly - 2 sets - 10 reps  - Standing Hip Abduction with Resistance at Ankles and Counter Support  - 1 x daily - 2 sets - 10 reps  - Standing Terminal Knee Extension at Wall with Ball  - 1 x daily - 7 x weekly - 2 sets - 10 reps - 5 sec hold    Access Code: [email or web address]  URL: [email or web address]  Date: 07/07/2025  Prepared by: Salima Ford    Exercises  - Supine Hamstring Stretch with Strap  - 1-2 x daily - 7 x weekly - 3 reps - 30 sec hold  - Supine Hip External Rotation Stretch  - 1-2 x daily - 3 reps - 30 sec hold  - Supine Bridge  - 1-2 x daily - 2 sets - 5-10 reps  - Modified Alexandre Stretch  - 1-2 x daily - 2 reps - 1 min hold  - Standing Lumbar Extension with Counter  - 1-2 x daily - 2 sets - 10 reps - 5 sec hold                 "

## 2025-07-18 ENCOUNTER — TELEPHONE (OUTPATIENT)
Dept: NEPHROLOGY | Facility: CLINIC | Age: 80
End: 2025-07-18

## 2025-07-21 NOTE — TELEPHONE ENCOUNTER
Returned call from patient - appointment rescheduled to 10/15/25. Patient informed that labs were required prior to appointment. Patient agreeable.

## 2025-07-22 ENCOUNTER — APPOINTMENT (OUTPATIENT)
Dept: PHYSICAL THERAPY | Facility: CLINIC | Age: 80
End: 2025-07-22
Attending: STUDENT IN AN ORGANIZED HEALTH CARE EDUCATION/TRAINING PROGRAM
Payer: COMMERCIAL

## 2025-07-23 ENCOUNTER — OFFICE VISIT (OUTPATIENT)
Dept: PHYSICAL THERAPY | Facility: CLINIC | Age: 80
End: 2025-07-23
Attending: STUDENT IN AN ORGANIZED HEALTH CARE EDUCATION/TRAINING PROGRAM
Payer: COMMERCIAL

## 2025-07-23 DIAGNOSIS — M46.1 SACROILIITIS (HCC): Primary | ICD-10-CM

## 2025-07-23 PROCEDURE — 97530 THERAPEUTIC ACTIVITIES: CPT | Performed by: PHYSICAL THERAPIST

## 2025-07-23 PROCEDURE — 97110 THERAPEUTIC EXERCISES: CPT | Performed by: PHYSICAL THERAPIST

## 2025-07-23 PROCEDURE — 97112 NEUROMUSCULAR REEDUCATION: CPT | Performed by: PHYSICAL THERAPIST

## 2025-07-23 NOTE — PROGRESS NOTES
Daily Note     Today's date: 2025  Patient name: Thomas Horne  : 1945  MRN: [id number]  Referring provider: Kirk Acuna MD  Dx:   Encounter Diagnosis     ICD-10-CM    1. Sacroiliitis (HCC)  M46.1                      Subjective: Pt states he thinks he's starting to see some improvement. Bridges remain painful today.       Objective: See treatment diary below      Assessment: Tolerated treatment well and remains tight in hip flexors which limits his upright posture and strains his back. Encouraged pt to keep doing HIWOT and hip flexor stretches. Patient would benefit from continued PT      Plan: Progress treatment as tolerated.       Precautions:   Past Medical History:   Diagnosis Date    Acute kidney injury superimposed on chronic kidney disease  ([facility]) 2022    Anemia     CAD (coronary artery disease)     Callus     Cancer ([facility])     prostate    CHF (congestive heart failure) ([facility])     Chronic kidney disease     Clotting disorder ([facility])     Coronary artery disease     Deep vein thrombosis ([facility])     Diabetes mellitus ([facility])     Diabetic ulcer of left foot associated with type 2 diabetes mellitus, with muscle involvement without evidence of necrosis ([facility]) 2024    Diabetic ulcer of right midfoot associated with diabetes mellitus due to underlying condition, limited to breakdown of skin (HCC) 06/15/2023    Diabetic ulcer of toe of right foot associated with type 2 diabetes mellitus, with necrosis of bone ([facility]) 2025    Difficulty walking     Duodenal ulcer     Ear problems     Erectile dysfunction     Heart disease     Heart murmur     HL (hearing loss)     Hyperlipidemia     Hypertension     Myocardial infarction (HCC)     Neuropathy     Bilateral feet    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)     Plantar fasciitis     Prostate cancer ([facility])     Sleep apnea     Could not tolerate CPAP    Urinary incontinence     Vascular disorder      Past Surgical History:   Procedure Laterality Date     ABDOMINAL AORTIC ANEURYSM REPAIR      Stented    ADENOIDECTOMY      BACK SURGERY  1985    CARDIAC CATHETERIZATION Left 10/19/2022    Procedure: Cardiac Left Heart Cath;  Surgeon: Danielle Pereira MD;  [facility];  Service: Cardiology    CARDIAC CATHETERIZATION  6/20/2025    Procedure: Cardiac Catheterization;  Surgeon: Itzel Parks DO;  [facility];  Service: Cardiology    CARDIAC CATHETERIZATION N/A 6/20/2025    Procedure: Cardiac Coronary Angiogram;  Surgeon: Itzel Parks DO;  [facility];  Service: Cardiology    CARDIAC ELECTROPHYSIOLOGY PROCEDURE Left 12/16/2024    Procedure: Cardiac pacer implant;  Surgeon: Danielle Pereira MD;  [facility];  Service: Cardiology    CARDIAC SURGERY  2002    3 cardiac bypass then angioplasty 7/2020    CHOLECYSTECTOMY      COLONOSCOPY      CORONARY ARTERY BYPASS GRAFT      IR LOWER EXTREMITY ANGIOGRAM  11/01/2023    IR LOWER EXTREMITY ANGIOGRAM  08/07/2024    IR LOWER EXTREMITY ANGIOGRAM  01/07/2025    LAMINECTOMY  1990    MI BYPASS W/VEIN FEMORAL-POPLITEAL Right 11/16/2023    Procedure: BYPASS FEMORAL-POPLITEAL WITH CRYO VEIN, RIGHT FEMORAL ENDARTERECTOMY;  Surgeon: Vasquez Clark MD;  [facility];  Service: Vascular    MI BYPASS W/VEIN FEMORAL-POPLITEAL Left 08/30/2024    Procedure: left lower extremity above knee popliteal to below knee popliteal artery bypass with PTFE graft;  Surgeon: Vasquez Clark MD;  [facility];  Service: Vascular    MI SLCTV CATHJ 3RD+ ORD SLCTV ABDL PEL/LXTR BRNCH Right 11/01/2023    Procedure: ARTERIOGRAM Right lower extremity arteriogram with CO2 via right groin access;  Surgeon: Vasquez Clark MD;  [facility];  Service: Vascular    MI SLCTV CATHJ 3RD+ ORD SLCTV ABDL PEL/LXTR BRNCH Left 08/07/2024    Procedure: diagnostic LLE Arteriogram;  Surgeon: Vasquez Clark MD;  [facility];  Service: Vascular    PROSTATE SURGERY      PROSTATECTOMY      TONSILLECTOMY      UPPER GASTROINTESTINAL ENDOSCOPY  2/14/2024    URINARY SPHINCTER IMPLANT     SOC: 7/3/2025  FOTO: 7/18/2025    POC EXP: 9/25/20025  DAILY TREATMENT LOG:  DATE 7/23/2025  7/9/2025 7/15/2025 7/18/2025  FOTO   VISIT #/AUTH 6  3  4  5   MANUAL        QL TP release in SL TT followed by SL LTR stretch               Neuro Re-Ed 10'   10'  20'   Paloff press     [place] 9# 1x10 R/L  [place] 9# 2x10 ea way   B/L shoulder ext w/ core bracing  [place] 10# 2x10 VC for upright posture   [place] 9# 1x10  [place] 10# 2x10 VC for upright posture   Std pball bracing     5\"x10     TKE w/ ball vs wall w/ upright posture 5\"x10 R/L    5\"x10 R/L; 2x   Uni row w/ trunk rotat [place] 10# 1x10 R/L                       Ther Ex 20'  30'  30'  25'   Recumb bike L2x5'  L2x5'  L2x5'  L2x5'   Sup hip flexor stretch off edge 20\"x3 R/L w/ gentle manual OP   1' R/L  1' R/L     Sup fig 4 stretch   30\"x3 R/L      Sup ham stretch w/ SOS   30\"x3 R/L      Supine LTR     5\"x10 R/L     Lumb rotat in SL 30\"x2 R/L after man QL TP release        HIWOT vs counter 5\"x10 vs counter  At counter 5\"x10; 2x  5\"x10  5\"x10 vs counter   Hip flexor stretch foot on step On step 10\"x5 R/L; 2x  8\" step 10\"x5 R/L  On step 10\"x5 R/L  On step 10\"x5 R/L; 2x   SL leg press    40# 2x10 R/L  40# 2x10 R/L     Stand alt hip abd w/ TB   1x10 R/L  GTB @ knees 1x10 R/L  Grn TB @ knees 1x10 R/L   Stand alt hip exten w/ TB   No TB 1x10 R/L  GTB @ knees 1x10 R/L  Grn TB @ knees 1x10 R/L   HEP     Update & review   Ther Activity 15'  10'      bridges Painful pre & post hip flexor & SL rotat stretching; did 1x5  1x10; 2x  Painful today     Long stepping at rail 3 laps       STS w/ [place] press 24\" mat; 3# DB's 1x10  Elevated mat 24\" 1x10      FSU w/ alt knee hike 8\" step 1x10 R/L w/ rail                       Modalities                        HEP  Access Code: [email or web address]  URL: " [email or web address]  Date: 07/17/2025  Prepared by: Salima Delgadoton    Exercises  - Supine Hamstring Stretch with Strap  - 1-2 x daily - 7 x weekly - 3 reps - 30 sec hold  - Supine Hip External Rotation Stretch  - 1-2 x daily - 3 reps - 30 sec hold  - Standing Lumbar Extension with Counter  - 1-2 x daily - 2 sets - 10 reps - 5 sec hold  - Hip Flexor Stretch on Step  - 2 x daily - 7 x weekly - 5 reps - 10 sec hold  - Shoulder extension with resistance - Neutral  - 1 x daily - 7 x weekly - 2 sets - 10 reps  - Standing Hip Extension Kicks  - 1 x daily - 7 x weekly - 2 sets - 10 reps  - Standing Hip Abduction with Resistance at Ankles and Counter Support  - 1 x daily - 2 sets - 10 reps  - Standing Terminal Knee Extension at Wall with Ball  - 1 x daily - 7 x weekly - 2 sets - 10 reps - 5 sec hold    Access Code: [email or web address]  URL: [email or web address]  Date: 07/07/2025  Prepared by: Salima Delgadoton    Exercises  - Supine Hamstring Stretch with Strap  - 1-2 x daily - 7 x weekly - 3 reps - 30 sec hold  - Supine Hip External Rotation Stretch  - 1-2 x daily - 3 reps - 30 sec hold  - Supine Bridge  - 1-2 x daily - 2 sets - 5-10 reps  - Modified Alexandre Stretch  - 1-2 x daily - 2 reps - 1 min hold  - Standing Lumbar Extension with Counter  - 1-2 x daily - 2 sets - 10 reps - 5 sec hold

## 2025-07-24 ENCOUNTER — OFFICE VISIT (OUTPATIENT)
Dept: PHYSICAL THERAPY | Facility: CLINIC | Age: 80
End: 2025-07-24
Attending: STUDENT IN AN ORGANIZED HEALTH CARE EDUCATION/TRAINING PROGRAM
Payer: COMMERCIAL

## 2025-07-24 DIAGNOSIS — M46.1 SACROILIITIS (HCC): Primary | ICD-10-CM

## 2025-07-24 PROCEDURE — 97110 THERAPEUTIC EXERCISES: CPT

## 2025-07-24 NOTE — PROGRESS NOTES
Daily Note     Today's date: 2025  Patient name: Thomas Horne  : 1945  MRN: [id number]  Referring provider: Kirk Acuna MD  Dx:   Encounter Diagnosis     ICD-10-CM    1. Sacroiliitis ([facility])  M46.1                      Subjective: pt reports that he cont w/ soreness after last session. He does admit to being more aware of his fwd posture and the effort to correct it using the HIWOT at the counter is helpful briefly.       Objective: See treatment diary below      Assessment: Tolerated treatment well.pt remains unable to perform bridging due to pain and does dem increased time w/ mat mobility also due to this. Pt encouraged to cont w/ HIWOT for upright posture and avoiding lumbar stress. Patient would benefit from continued PT      Plan: Continue per plan of care.      Precautions:   Past Medical History:   Diagnosis Date    Acute kidney injury superimposed on chronic kidney disease  ([facility]) 2022    Anemia     CAD (coronary artery disease)     Callus     Cancer ([facility])     prostate    CHF (congestive heart failure) ([facility])     Chronic kidney disease     Clotting disorder ([facility])     Coronary artery disease     Deep vein thrombosis ([facility])     Diabetes mellitus ([facility])     Diabetic ulcer of left foot associated with type 2 diabetes mellitus, with muscle involvement without evidence of necrosis ([facility]) 2024    Diabetic ulcer of right midfoot associated with diabetes mellitus due to underlying condition, limited to breakdown of skin ([facility]) 06/15/2023    Diabetic ulcer of toe of right foot associated with type 2 diabetes mellitus, with necrosis of bone ([facility]) 2025    Difficulty walking     Duodenal ulcer     Ear problems     Erectile dysfunction     Heart disease     Heart murmur     HL (hearing loss)     Hyperlipidemia     Hypertension     Myocardial infarction ([facility])     Neuropathy     Bilateral feet    Neuropathy in diabetes ([facility])     Plantar fasciitis     Prostate cancer ([facility])     Sleep apnea      Could not tolerate CPAP    Urinary incontinence     Vascular disorder 2023     Past Surgical History:   Procedure Laterality Date    ABDOMINAL AORTIC ANEURYSM REPAIR      Stented    ADENOIDECTOMY      BACK SURGERY  1985    CARDIAC CATHETERIZATION Left 10/19/2022    Procedure: Cardiac Left Heart Cath;  Surgeon: Danielle Pereira MD;  [facility];  Service: Cardiology    CARDIAC CATHETERIZATION  6/20/2025    Procedure: Cardiac Catheterization;  Surgeon: Itzel Parks DO;  [facility];  Service: Cardiology    CARDIAC CATHETERIZATION N/A 6/20/2025    Procedure: Cardiac Coronary Angiogram;  Surgeon: Itzel Parks DO;  [facility];  Service: Cardiology    CARDIAC ELECTROPHYSIOLOGY PROCEDURE Left 12/16/2024    Procedure: Cardiac pacer implant;  Surgeon: Danielle Pereira MD;  [facility];  Service: Cardiology    CARDIAC SURGERY  2002    3 cardiac bypass then angioplasty 7/2020    CHOLECYSTECTOMY      COLONOSCOPY      CORONARY ARTERY BYPASS GRAFT      IR LOWER EXTREMITY ANGIOGRAM  11/01/2023    IR LOWER EXTREMITY ANGIOGRAM  08/07/2024    IR LOWER EXTREMITY ANGIOGRAM  01/07/2025    LAMINECTOMY  1990    AK BYPASS W/VEIN FEMORAL-POPLITEAL Right 11/16/2023    Procedure: BYPASS FEMORAL-POPLITEAL WITH CRYO VEIN, RIGHT FEMORAL ENDARTERECTOMY;  Surgeon: Vasquez Clark MD;  [facility];  Service: Vascular    AK BYPASS W/VEIN FEMORAL-POPLITEAL Left 08/30/2024    Procedure: left lower extremity above knee popliteal to below knee popliteal artery bypass with PTFE graft;  Surgeon: Vasquez Clark MD;  [facility];  Service: Vascular    AK SLCTV CATHJ 3RD+ ORD SLCTV ABDL PEL/LXTR BRNCH Right 11/01/2023    Procedure: ARTERIOGRAM Right lower extremity arteriogram with CO2 via right groin access;  Surgeon: Vasquez Clark MD;  [facility];  Service: Vascular    AK SLCTV CATHJ 3RD+ ORD SLCTV ABDL  "PEL/LXTR BRNCH Left 08/07/2024    Procedure: diagnostic LLE Arteriogram;  Surgeon: Vasquez Clark MD;  [facility];  Service: Vascular    PROSTATE SURGERY      PROSTATECTOMY      TONSILLECTOMY      UPPER GASTROINTESTINAL ENDOSCOPY  2/14/2024    URINARY SPHINCTER IMPLANT     SOC: 7/3/2025  FOTO: 7/18/2025    POC EXP: 9/25/20025  DAILY TREATMENT LOG:  DATE 7/23/2025 7/24/2025  7/15/2025 7/18/2025  FOTO   VISIT #/AUTH 6 7   4  5   MANUAL        QL TP release in SL TT followed by SL LTR stretch               Neuro Re-Ed 10'   10'  20'   Paloff press     [place] 9# 1x10 R/L  [place] 9# 2x10 ea way   B/L shoulder ext w/ core bracing  [place] 10# 2x10 VC for upright posture   Rogerio 9# 1x10  [place] 10# 2x10 VC for upright posture   Std pball bracing     5\"x10     TKE w/ ball vs wall w/ upright posture 5\"x10 R/L 5\"x10 R/L    5\"x10 R/L; 2x   Uni row w/ trunk rotat [place] 10# 1x10 R/L Dlb blue tubing 1x10 R/L                       Ther Ex 20'   30'  25'   Recumb bike L2x5' L2x5'   L2x5'  L2x5'   Sup hip flexor stretch off edge 20\"x3 R/L w/ gentle manual OP  20\"x3 R/L   1' R/L     Sup fig 4 stretch        Sup ham stretch w/ SOS        Supine LTR   10\"x5 R/L   5\"x10 R/L     Lumb rotat in SL 30\"x2 R/L after man QL TP release        HIWOT vs counter 5\"x10 vs counter 5\"x10 vs counter   5\"x10  5\"x10 vs counter   Hip flexor stretch foot on step On step 10\"x5 R/L; 2x On step 10\"x5 R/L; 2x   On step 10\"x5 R/L  On step 10\"x5 R/L; 2x   SL leg press     40# 2x10 R/L     Stand alt hip abd w/ TB  GTB @ knees 1x10 R/L; 2x    GTB @ knees 1x10 R/L  Grn TB @ knees 1x10 R/L   Stand alt hip exten w/ TB  GTB @ knees 1x10 R/L; 2x   GTB @ knees 1x10 R/L  Grn TB @ knees 1x10 R/L   HEP     Update & review   Ther Activity 15'       bridges Painful pre & post hip flexor & SL rotat stretching; did 1x5   Painful today     Long stepping at rail 3 laps At counter 4 laps       STS w/ OH press 24\" mat; 3# DB's 1x10 24\" mat; 3# DB's 1x10     " "  FSU w/ alt knee hike 8\" step 1x10 R/L w/ rail                       Modalities                        HEP  Access Code: [email or web address]  URL: [email or web address]  Date: 07/17/2025  Prepared by: Salima Ford    Exercises  - Supine Hamstring Stretch with Strap  - 1-2 x daily - 7 x weekly - 3 reps - 30 sec hold  - Supine Hip External Rotation Stretch  - 1-2 x daily - 3 reps - 30 sec hold  - Standing Lumbar Extension with Counter  - 1-2 x daily - 2 sets - 10 reps - 5 sec hold  - Hip Flexor Stretch on Step  - 2 x daily - 7 x weekly - 5 reps - 10 sec hold  - Shoulder extension with resistance - Neutral  - 1 x daily - 7 x weekly - 2 sets - 10 reps  - Standing Hip Extension Kicks  - 1 x daily - 7 x weekly - 2 sets - 10 reps  - Standing Hip Abduction with Resistance at Ankles and Counter Support  - 1 x daily - 2 sets - 10 reps  - Standing Terminal Knee Extension at Wall with Ball  - 1 x daily - 7 x weekly - 2 sets - 10 reps - 5 sec hold    Access Code: [email or web address]  URL: [email or web address]  Date: 07/07/2025  Prepared by: Salima Ford    Exercises  - Supine Hamstring Stretch with Strap  - 1-2 x daily - 7 x weekly - 3 reps - 30 sec hold  - Supine Hip External Rotation Stretch  - 1-2 x daily - 3 reps - 30 sec hold  - Supine Bridge  - 1-2 x daily - 2 sets - 5-10 reps  - Modified Alexandre Stretch  - 1-2 x daily - 2 reps - 1 min hold  - Standing Lumbar Extension with Counter  - 1-2 x daily - 2 sets - 10 reps - 5 sec hold                     "

## 2025-07-25 ENCOUNTER — TELEPHONE (OUTPATIENT)
Dept: CARDIAC SURGERY | Facility: CLINIC | Age: 80
End: 2025-07-25

## 2025-07-25 ENCOUNTER — REMOTE DEVICE CLINIC VISIT (OUTPATIENT)
Dept: CARDIOLOGY CLINIC | Facility: CLINIC | Age: 80
End: 2025-07-25
Payer: COMMERCIAL

## 2025-07-25 DIAGNOSIS — Z95.0 PRESENCE OF PERMANENT CARDIAC PACEMAKER: Primary | ICD-10-CM

## 2025-07-25 PROCEDURE — 93296 REM INTERROG EVL PM/IDS: CPT | Performed by: INTERNAL MEDICINE

## 2025-07-25 PROCEDURE — 93294 REM INTERROG EVL PM/LDLS PM: CPT | Performed by: INTERNAL MEDICINE

## 2025-07-25 NOTE — TELEPHONE ENCOUNTER
Spoke with patient and questioned why he canceled the Thoracic consult. He states he does not want lung sx. Informed him we need Thoracic Surgery to assess the CTA results and discuss with him, including treatment options prior to TAVR. He agreed to move forward with consult. Additionally he is scheduled to begin dental extractions next week. Will follow up once consult done and dental clearance obtained.

## 2025-07-29 ENCOUNTER — EVALUATION (OUTPATIENT)
Dept: PHYSICAL THERAPY | Facility: CLINIC | Age: 80
End: 2025-07-29
Attending: STUDENT IN AN ORGANIZED HEALTH CARE EDUCATION/TRAINING PROGRAM
Payer: COMMERCIAL

## 2025-07-29 DIAGNOSIS — M46.1 SACROILIITIS (HCC): Primary | ICD-10-CM

## 2025-07-29 PROCEDURE — 97110 THERAPEUTIC EXERCISES: CPT | Performed by: PHYSICAL THERAPIST

## 2025-07-29 PROCEDURE — 97140 MANUAL THERAPY 1/> REGIONS: CPT | Performed by: PHYSICAL THERAPIST

## 2025-07-31 ENCOUNTER — OFFICE VISIT (OUTPATIENT)
Dept: PHYSICAL THERAPY | Facility: CLINIC | Age: 80
End: 2025-07-31
Attending: STUDENT IN AN ORGANIZED HEALTH CARE EDUCATION/TRAINING PROGRAM
Payer: COMMERCIAL

## 2025-07-31 DIAGNOSIS — M46.1 SACROILIITIS (HCC): Primary | ICD-10-CM

## 2025-07-31 PROCEDURE — 97110 THERAPEUTIC EXERCISES: CPT

## 2025-07-31 PROCEDURE — 97530 THERAPEUTIC ACTIVITIES: CPT

## 2025-08-04 ENCOUNTER — OFFICE VISIT (OUTPATIENT)
Dept: PHYSICAL THERAPY | Facility: CLINIC | Age: 80
End: 2025-08-04
Attending: STUDENT IN AN ORGANIZED HEALTH CARE EDUCATION/TRAINING PROGRAM
Payer: COMMERCIAL

## 2025-08-04 DIAGNOSIS — M46.1 SACROILIITIS (HCC): Primary | ICD-10-CM

## 2025-08-04 PROCEDURE — 97530 THERAPEUTIC ACTIVITIES: CPT

## 2025-08-04 PROCEDURE — 97110 THERAPEUTIC EXERCISES: CPT

## 2025-08-05 ENCOUNTER — OFFICE VISIT (OUTPATIENT)
Age: 80
End: 2025-08-05
Payer: COMMERCIAL

## 2025-08-05 ENCOUNTER — TELEMEDICINE (OUTPATIENT)
Dept: VASCULAR SURGERY | Facility: CLINIC | Age: 80
End: 2025-08-05
Payer: COMMERCIAL

## 2025-08-05 ENCOUNTER — TELEPHONE (OUTPATIENT)
Facility: MEDICAL CENTER | Age: 80
End: 2025-08-05

## 2025-08-05 DIAGNOSIS — M54.16 LUMBAR RADICULOPATHY: Primary | ICD-10-CM

## 2025-08-05 DIAGNOSIS — G89.4 CHRONIC PAIN SYNDROME: ICD-10-CM

## 2025-08-05 DIAGNOSIS — M46.1 SACROILIITIS (HCC): ICD-10-CM

## 2025-08-05 DIAGNOSIS — I73.9 PERIPHERAL ARTERY DISEASE (HCC): Primary | ICD-10-CM

## 2025-08-05 PROCEDURE — 99214 OFFICE O/P EST MOD 30 MIN: CPT | Performed by: SURGERY

## 2025-08-05 PROCEDURE — 99214 OFFICE O/P EST MOD 30 MIN: CPT | Performed by: STUDENT IN AN ORGANIZED HEALTH CARE EDUCATION/TRAINING PROGRAM

## 2025-08-05 PROCEDURE — G2211 COMPLEX E/M VISIT ADD ON: HCPCS | Performed by: STUDENT IN AN ORGANIZED HEALTH CARE EDUCATION/TRAINING PROGRAM

## 2025-08-05 RX ORDER — DIAZEPAM 5 MG/1
5-10 TABLET ORAL ONCE AS NEEDED
Qty: 2 TABLET | Refills: 0 | Status: SHIPPED | OUTPATIENT
Start: 2025-08-05

## 2025-08-06 ENCOUNTER — OFFICE VISIT (OUTPATIENT)
Dept: PHYSICAL THERAPY | Facility: CLINIC | Age: 80
End: 2025-08-06
Attending: STUDENT IN AN ORGANIZED HEALTH CARE EDUCATION/TRAINING PROGRAM
Payer: COMMERCIAL

## 2025-08-06 DIAGNOSIS — M46.1 SACROILIITIS (HCC): Primary | ICD-10-CM

## 2025-08-06 PROCEDURE — 97112 NEUROMUSCULAR REEDUCATION: CPT | Performed by: PHYSICAL THERAPIST

## 2025-08-06 PROCEDURE — 97110 THERAPEUTIC EXERCISES: CPT | Performed by: PHYSICAL THERAPIST

## 2025-08-11 ENCOUNTER — APPOINTMENT (OUTPATIENT)
Dept: LAB | Facility: CLINIC | Age: 80
End: 2025-08-11
Attending: FAMILY MEDICINE
Payer: COMMERCIAL

## 2025-08-12 ENCOUNTER — OFFICE VISIT (OUTPATIENT)
Dept: PHYSICAL THERAPY | Facility: CLINIC | Age: 80
End: 2025-08-12
Attending: STUDENT IN AN ORGANIZED HEALTH CARE EDUCATION/TRAINING PROGRAM
Payer: COMMERCIAL

## 2025-08-12 ENCOUNTER — RESULTS FOLLOW-UP (OUTPATIENT)
Dept: FAMILY MEDICINE CLINIC | Facility: CLINIC | Age: 80
End: 2025-08-12

## 2025-08-13 ENCOUNTER — TELEPHONE (OUTPATIENT)
Dept: CARDIAC SURGERY | Facility: CLINIC | Age: 80
End: 2025-08-13

## 2025-08-15 ENCOUNTER — TELEPHONE (OUTPATIENT)
Dept: PHYSICAL THERAPY | Facility: CLINIC | Age: 80
End: 2025-08-15

## 2025-08-18 ENCOUNTER — OFFICE VISIT (OUTPATIENT)
Dept: FAMILY MEDICINE CLINIC | Facility: CLINIC | Age: 80
End: 2025-08-18
Payer: COMMERCIAL

## 2025-08-18 ENCOUNTER — OFFICE VISIT (OUTPATIENT)
Dept: PHYSICAL THERAPY | Facility: CLINIC | Age: 80
End: 2025-08-18
Attending: STUDENT IN AN ORGANIZED HEALTH CARE EDUCATION/TRAINING PROGRAM
Payer: COMMERCIAL

## 2025-08-18 VITALS
SYSTOLIC BLOOD PRESSURE: 124 MMHG | HEART RATE: 97 BPM | TEMPERATURE: 98.1 F | HEIGHT: 74 IN | DIASTOLIC BLOOD PRESSURE: 68 MMHG | WEIGHT: 219.4 LBS | BODY MASS INDEX: 28.16 KG/M2 | RESPIRATION RATE: 18 BRPM

## 2025-08-18 DIAGNOSIS — I35.0 SEVERE AORTIC VALVE STENOSIS: ICD-10-CM

## 2025-08-18 DIAGNOSIS — Z79.4 TYPE 2 DIABETES MELLITUS WITH DIABETIC PERIPHERAL ANGIOPATHY WITHOUT GANGRENE, WITH LONG-TERM CURRENT USE OF INSULIN (HCC): Primary | ICD-10-CM

## 2025-08-18 DIAGNOSIS — D63.1 ANEMIA DUE TO STAGE 3B CHRONIC KIDNEY DISEASE  (HCC): ICD-10-CM

## 2025-08-18 DIAGNOSIS — E11.51 TYPE 2 DIABETES MELLITUS WITH DIABETIC PERIPHERAL ANGIOPATHY WITHOUT GANGRENE, WITH LONG-TERM CURRENT USE OF INSULIN (HCC): Primary | ICD-10-CM

## 2025-08-18 DIAGNOSIS — I25.10 CORONARY ARTERY DISEASE INVOLVING NATIVE CORONARY ARTERY OF NATIVE HEART WITHOUT ANGINA PECTORIS: ICD-10-CM

## 2025-08-18 DIAGNOSIS — E78.2 MIXED HYPERLIPIDEMIA: ICD-10-CM

## 2025-08-18 DIAGNOSIS — R06.02 SOB (SHORTNESS OF BREATH): ICD-10-CM

## 2025-08-18 DIAGNOSIS — M46.1 SACROILIITIS (HCC): Primary | ICD-10-CM

## 2025-08-18 DIAGNOSIS — I10 PRIMARY HYPERTENSION: ICD-10-CM

## 2025-08-18 DIAGNOSIS — N18.32 ANEMIA DUE TO STAGE 3B CHRONIC KIDNEY DISEASE  (HCC): ICD-10-CM

## 2025-08-18 DIAGNOSIS — I50.32 CHRONIC DIASTOLIC CONGESTIVE HEART FAILURE (HCC): ICD-10-CM

## 2025-08-18 PROCEDURE — G2211 COMPLEX E/M VISIT ADD ON: HCPCS | Performed by: FAMILY MEDICINE

## 2025-08-18 PROCEDURE — 97110 THERAPEUTIC EXERCISES: CPT

## 2025-08-18 PROCEDURE — 99214 OFFICE O/P EST MOD 30 MIN: CPT | Performed by: FAMILY MEDICINE

## 2025-08-19 ENCOUNTER — HOSPITAL ENCOUNTER (OUTPATIENT)
Dept: RADIOLOGY | Facility: HOSPITAL | Age: 80
Discharge: HOME | End: 2025-08-19
Payer: COMMERCIAL

## 2025-08-19 ENCOUNTER — TELEPHONE (OUTPATIENT)
Age: 80
End: 2025-08-19

## 2025-08-19 VITALS — HEART RATE: 85 BPM | OXYGEN SATURATION: 94 %

## 2025-08-19 DIAGNOSIS — M54.16 LUMBAR RADICULOPATHY: ICD-10-CM

## 2025-08-19 PROCEDURE — 72148 MRI LUMBAR SPINE W/O DYE: CPT

## 2025-08-20 ENCOUNTER — OFFICE VISIT (OUTPATIENT)
Dept: PHYSICAL THERAPY | Facility: CLINIC | Age: 80
End: 2025-08-20
Attending: STUDENT IN AN ORGANIZED HEALTH CARE EDUCATION/TRAINING PROGRAM
Payer: COMMERCIAL

## 2025-08-20 DIAGNOSIS — M46.1 SACROILIITIS (HCC): Primary | ICD-10-CM

## 2025-08-20 PROCEDURE — 97110 THERAPEUTIC EXERCISES: CPT

## 2025-08-20 PROCEDURE — 97530 THERAPEUTIC ACTIVITIES: CPT

## 2025-08-20 PROCEDURE — 97112 NEUROMUSCULAR REEDUCATION: CPT

## 2025-08-21 ENCOUNTER — OFFICE VISIT (OUTPATIENT)
Age: 80
End: 2025-08-21
Payer: COMMERCIAL

## 2025-08-21 VITALS — HEIGHT: 74 IN | RESPIRATION RATE: 18 BRPM | BODY MASS INDEX: 28.17 KG/M2

## 2025-08-21 DIAGNOSIS — E11.621 DIABETIC ULCER OF LEFT HEEL ASSOCIATED WITH TYPE 2 DIABETES MELLITUS, LIMITED TO BREAKDOWN OF SKIN (HCC): ICD-10-CM

## 2025-08-21 DIAGNOSIS — L97.421 DIABETIC ULCER OF LEFT HEEL ASSOCIATED WITH TYPE 2 DIABETES MELLITUS, LIMITED TO BREAKDOWN OF SKIN (HCC): ICD-10-CM

## 2025-08-21 DIAGNOSIS — E11.42 DIABETIC POLYNEUROPATHY ASSOCIATED WITH TYPE 2 DIABETES MELLITUS (HCC): ICD-10-CM

## 2025-08-21 DIAGNOSIS — I70.209 PERIPHERAL ARTERIOSCLEROSIS (HCC): ICD-10-CM

## 2025-08-21 DIAGNOSIS — M54.16 RADICULOPATHY OF LUMBAR REGION: ICD-10-CM

## 2025-08-21 DIAGNOSIS — M77.42 METATARSALGIA OF BOTH FEET: Primary | ICD-10-CM

## 2025-08-21 DIAGNOSIS — M77.41 METATARSALGIA OF BOTH FEET: Primary | ICD-10-CM

## 2025-08-21 PROCEDURE — 99213 OFFICE O/P EST LOW 20 MIN: CPT | Performed by: PODIATRIST

## (undated) DEVICE — MICROPUNCTURE 401

## (undated) DEVICE — DRESSING MEPILEX AG BORDER POST-OP 4 X 8 IN

## (undated) DEVICE — LIGACLIP MCA MULTIPLE CLIP APPLIERS, 20 MEDIUM CLIPS: Brand: LIGACLIP

## (undated) DEVICE — DISPOSABLE OR TOWEL: Brand: CARDINAL HEALTH

## (undated) DEVICE — CATH F4 INF IM 100CM: Brand: INFINITI

## (undated) DEVICE — MICROPUNCTURE INTRODUCER SET SILHOUETTE TRANSITIONLESS PUSH-PLUS DESIGN - STIFFENED CANNULA WITH NITINOL WIRE GUIDE: Brand: MICROPUNCTURE

## (undated) DEVICE — DRESSING AQUACEL AG 3.5 X 6 IN

## (undated) DEVICE — 450 ML BOTTLE OF 0.05% CHLORHEXIDINE GLUCONATE IN 99.95% STERILE WATER FOR IRRIGATION, USP AND APPLICATOR.: Brand: IRRISEPT ANTIMICROBIAL WOUND LAVAGE

## (undated) DEVICE — STERILE BETHLEHEM FEM POP PACK: Brand: CARDINAL HEALTH

## (undated) DEVICE — PROXIMATE SKIN STAPLERS (35 WIDE) CONTAINS 35 STAINLESS STEEL STAPLES (FIXED HEAD): Brand: PROXIMATE

## (undated) DEVICE — INTRO SHEATH SAFE 7FR ULTRA TEAR AWAY

## (undated) DEVICE — EXOFIN PRECISION PEN HIGH VISCOSITY TOPICAL SKIN ADHESIVE: Brand: EXOFIN PRECISION PEN, 1G

## (undated) DEVICE — 3M™ IOBAN™ 2 ANTIMICROBIAL INCISE DRAPE 6650EZ: Brand: IOBAN™ 2

## (undated) DEVICE — VESSEL CANNULA

## (undated) DEVICE — SUT VICRYL 3-0 SH 27 IN J416H

## (undated) DEVICE — STRL BETHLEHEM A V FISTULA PK: Brand: CARDINAL HEALTH

## (undated) DEVICE — DGW .035 FC J3MM 150CM TEF: Brand: EMERALD

## (undated) DEVICE — ADHESIVE SKIN HIGH VISCOSITY EXOFIN 1ML

## (undated) DEVICE — GLOVE SRG BIOGEL 7.5

## (undated) DEVICE — PETRI DISH STERILE

## (undated) DEVICE — SPONGE SCRUB 4 PCT CHLORHEXIDINE

## (undated) DEVICE — SUT SILK 2-0 30 IN A305H

## (undated) DEVICE — CHLORAPREP HI-LITE 26ML ORANGE

## (undated) DEVICE — DECANTER: Brand: UNBRANDED

## (undated) DEVICE — RADIFOCUS TORQUE DEVICE MULTI-TORQUE VISE: Brand: RADIFOCUS TORQUE DEVICE

## (undated) DEVICE — RADIFOCUS GLIDEWIRE: Brand: GLIDEWIRE

## (undated) DEVICE — Device

## (undated) DEVICE — STERILE ICS CARDIOVASCULAR PK: Brand: CARDINAL HEALTH

## (undated) DEVICE — 40529 DERMAPROX PAD 11'' X 15'' X 1'': Brand: 40529 DERMAPROX PAD 11'' X 15'' X 1''

## (undated) DEVICE — NON-DEHP HIGH FLOW RATE EXTENSION SET, MALE LUER LOCK ADAPTER

## (undated) DEVICE — TR BAND RADIAL ARTERY COMPRESSION DEVICE: Brand: TR BAND

## (undated) DEVICE — CATH GUIDING FIXED SHAPE 43CM

## (undated) DEVICE — PART NUMBER 108280, VTI 8 MHZ DISPOSABLE DOPPLER PROBE,  BOX: Brand: VTI 8 MHZ DISPOSABLE DOPPLER PROBE, BOX

## (undated) DEVICE — CATH DIAG 5FR IMPULSE 100CM RCB

## (undated) DEVICE — PENCIL ELECTROSURG E-Z CLEAN -0035H

## (undated) DEVICE — SURGIFOAM 8.5 X 12.5

## (undated) DEVICE — SUT PROLENE 6-0 BV130 30 IN 8709H

## (undated) DEVICE — SURGICEL FIBRILLAR 1 X 2

## (undated) DEVICE — PINNACLE INTRODUCER SHEATH: Brand: PINNACLE

## (undated) DEVICE — GLIDESHEATH BASIC HYDROPHILIC COATED INTRODUCER SHEATH: Brand: GLIDESHEATH

## (undated) DEVICE — SUT MONOCRYL 3-0 SH 27 IN Y416H

## (undated) DEVICE — SUT SILK 3-0 30 IN A304H

## (undated) DEVICE — CATH DIAG 5FR .035 65CM 6S OMMI-FLUSH

## (undated) DEVICE — SUT PROLENE 6-0 BV-1/BV-1 24 IN 8805H

## (undated) DEVICE — SUT SILK 3-0 18 IN A184H

## (undated) DEVICE — GLOVE INDICATOR PI UNDERGLOVE SZ 8 BLUE

## (undated) DEVICE — PAD GROUNDING ADULT

## (undated) DEVICE — ACE WRAP 6 IN UNSTERILE

## (undated) DEVICE — CAST PADDING 6 IN SYNTHETIC STRL

## (undated) DEVICE — STR GUIDEWIRE BOWL WITH LID PK: Brand: CARDINAL HEALTH

## (undated) DEVICE — SUT SILK 4-0 18 IN A183H

## (undated) DEVICE — SUT PROLENE 4-0 RB-1/RB-1 36 IN 8557H

## (undated) DEVICE — SUT MONOCRYL 2-0 CT-1 27 IN Y339H

## (undated) DEVICE — DRESSING MEPILEX AG BORDER POST-OP 4 X 6 IN

## (undated) DEVICE — IMMOBILIZER SHOULDER UNIVERAL

## (undated) DEVICE — X-RAY DETECTABLE SPONGES,16 PLY: Brand: VISTEC

## (undated) DEVICE — 40601 PROLONGED POSITIONING SYSTEM: Brand: 40601 PROLONGED POSITIONING SYSTEM

## (undated) DEVICE — FLUID MANAGEMENT KIT - IR

## (undated) DEVICE — CATH FOLEY 12FR 5ML 2WAY LUBRICATH

## (undated) DEVICE — 1/2 FORCE SURGICAL SPRING CLIP: Brand: STEALTH® SPRING CLIP

## (undated) DEVICE — PINNACLE R/O II INTRODUCER SHEATH WITH RADIOPAQUE MARKER: Brand: PINNACLE

## (undated) DEVICE — CATH DIAG 6FR IMPULSE 100CM FL4

## (undated) DEVICE — SYRINGE 10ML LL

## (undated) DEVICE — CATH DIAG 5FR IMPULSE 100CM FR4

## (undated) DEVICE — SUT MONOCRYL 4-0 PS-2 27 IN Y426H

## (undated) DEVICE — CATH DIAG 6FR IMPULSE 100CM FR4

## (undated) DEVICE — DRAPE SURGIKIT SADDLE BAG

## (undated) DEVICE — ASTOUND STANDARD SURGICAL GOWN, XL: Brand: CONVERTORS

## (undated) DEVICE — SUT MONOCRYL 4-0 PS-2 18 IN Y496G

## (undated) DEVICE — SUT SILK 4-0 30 IN A303H

## (undated) DEVICE — PACK CCL/IR MINOR PROCEDURE

## (undated) DEVICE — PROBE COVER: Brand: STERILE PROBE COVER

## (undated) DEVICE — DGW .035 FC J3MM 260CM TEF: Brand: EMERALD

## (undated) DEVICE — TRAY FOLEY 16FR URIMETER SURESTEP

## (undated) DEVICE — CUFF TOURNIQUET 34 X 4 IN QUICK CONNECT DISP 1BLA

## (undated) DEVICE — LIGACLIP MCA MULTIPLE CLIP APPLIERS, 20 SMALL CLIPS: Brand: LIGACLIP

## (undated) DEVICE — BASIC SINGLE BASIN-LF: Brand: MEDLINE INDUSTRIES, INC.

## (undated) DEVICE — SURGICEL 4 X 8IN

## (undated) DEVICE — SUT SILK 2-0 18 IN A185H

## (undated) DEVICE — MICROPUNCTURE INTRODUCER SET SILHOUETTE TRANSITIONLESS PUSH-PLUS DESIGN - STIFFENED CANNULA WITH STAINLESS STEEL WIRE GUIDE: Brand: MICROPUNCTURE

## (undated) DEVICE — BAG DRAINAGE URINARY W TOWER

## (undated) DEVICE — SLITTER ADJUSTABLE

## (undated) DEVICE — PACK UNIVERSAL DRAPES SUB-Q ICD